# Patient Record
Sex: MALE | Race: BLACK OR AFRICAN AMERICAN | Employment: FULL TIME | URBAN - METROPOLITAN AREA
[De-identification: names, ages, dates, MRNs, and addresses within clinical notes are randomized per-mention and may not be internally consistent; named-entity substitution may affect disease eponyms.]

---

## 2017-03-24 ENCOUNTER — OFFICE VISIT (OUTPATIENT)
Dept: CARDIOLOGY CLINIC | Age: 52
End: 2017-03-24

## 2017-03-24 VITALS
HEIGHT: 68 IN | HEART RATE: 82 BPM | OXYGEN SATURATION: 98 % | BODY MASS INDEX: 30.77 KG/M2 | WEIGHT: 203 LBS | SYSTOLIC BLOOD PRESSURE: 128 MMHG | DIASTOLIC BLOOD PRESSURE: 84 MMHG

## 2017-03-24 DIAGNOSIS — N18.2 CKD (CHRONIC KIDNEY DISEASE) STAGE 2, GFR 60-89 ML/MIN: ICD-10-CM

## 2017-03-24 DIAGNOSIS — E78.5 HYPERLIPIDEMIA WITH TARGET LDL LESS THAN 70: ICD-10-CM

## 2017-03-24 DIAGNOSIS — I25.709 CORONARY ARTERY DISEASE INVOLVING CORONARY BYPASS GRAFT OF NATIVE HEART WITH ANGINA PECTORIS (HCC): Primary | ICD-10-CM

## 2017-03-24 DIAGNOSIS — I10 ESSENTIAL HYPERTENSION: ICD-10-CM

## 2017-03-24 DIAGNOSIS — I65.23 BILATERAL CAROTID ARTERY STENOSIS: ICD-10-CM

## 2017-03-24 DIAGNOSIS — I25.5 ISCHEMIC CARDIOMYOPATHY: ICD-10-CM

## 2017-03-24 PROCEDURE — 99215 OFFICE O/P EST HI 40 MIN: CPT | Performed by: INTERNAL MEDICINE

## 2017-03-24 RX ORDER — RANOLAZINE 500 MG/1
500 TABLET, EXTENDED RELEASE ORAL 2 TIMES DAILY
Qty: 60 TABLET | Refills: 3 | Status: SHIPPED | OUTPATIENT
Start: 2017-03-24 | End: 2018-05-24 | Stop reason: SDUPTHER

## 2017-04-03 ENCOUNTER — HOSPITAL ENCOUNTER (OUTPATIENT)
Dept: VASCULAR LAB | Age: 52
Discharge: HOME | End: 2017-04-03
Attending: INTERNAL MEDICINE | Admitting: INTERNAL MEDICINE

## 2017-04-03 ENCOUNTER — HOSPITAL ENCOUNTER (OUTPATIENT)
Dept: NON INVASIVE DIAGNOSTICS | Age: 52
Discharge: HOME | End: 2017-04-04
Admitting: INTERNAL MEDICINE

## 2017-04-03 DIAGNOSIS — I65.23 OCCLUSION AND STENOSIS OF BILATERAL CAROTID ARTERIES: ICD-10-CM

## 2017-04-03 LAB
LV EF: 47 %
LVEF MODALITY: NORMAL

## 2017-04-25 RX ORDER — HYDRALAZINE HYDROCHLORIDE 50 MG/1
TABLET, FILM COATED ORAL
Qty: 90 TABLET | Refills: 2 | Status: SHIPPED | OUTPATIENT
Start: 2017-04-25 | End: 2017-10-09 | Stop reason: SDUPTHER

## 2017-05-04 ENCOUNTER — OFFICE VISIT (OUTPATIENT)
Dept: ENDOCRINOLOGY | Age: 52
End: 2017-05-04

## 2017-05-04 VITALS
DIASTOLIC BLOOD PRESSURE: 88 MMHG | OXYGEN SATURATION: 95 % | BODY MASS INDEX: 30.52 KG/M2 | HEART RATE: 82 BPM | RESPIRATION RATE: 16 BRPM | HEIGHT: 68 IN | SYSTOLIC BLOOD PRESSURE: 165 MMHG | WEIGHT: 201.4 LBS

## 2017-05-04 DIAGNOSIS — I10 ESSENTIAL HYPERTENSION: ICD-10-CM

## 2017-05-04 DIAGNOSIS — E10.22 TYPE 1 DIABETES MELLITUS WITH DIABETIC CHRONIC KIDNEY DISEASE, UNSPECIFIED CKD STAGE (HCC): Primary | ICD-10-CM

## 2017-05-04 DIAGNOSIS — Z46.81 INSULIN PUMP TITRATION: ICD-10-CM

## 2017-05-04 DIAGNOSIS — Z96.41 INSULIN PUMP IN PLACE: ICD-10-CM

## 2017-05-04 LAB — HBA1C MFR BLD: 9 %

## 2017-05-04 PROCEDURE — 83036 HEMOGLOBIN GLYCOSYLATED A1C: CPT | Performed by: INTERNAL MEDICINE

## 2017-05-04 PROCEDURE — 99215 OFFICE O/P EST HI 40 MIN: CPT | Performed by: INTERNAL MEDICINE

## 2017-05-16 RX ORDER — FUROSEMIDE 40 MG/1
40 TABLET ORAL DAILY
Qty: 90 TABLET | Refills: 2 | Status: SHIPPED | OUTPATIENT
Start: 2017-05-16 | End: 2017-05-17 | Stop reason: SDUPTHER

## 2017-05-16 RX ORDER — CLOPIDOGREL BISULFATE 75 MG/1
75 TABLET ORAL DAILY
Qty: 90 TABLET | Refills: 2 | Status: SHIPPED | OUTPATIENT
Start: 2017-05-16 | End: 2017-05-17 | Stop reason: SDUPTHER

## 2017-05-17 ENCOUNTER — TELEPHONE (OUTPATIENT)
Dept: CARDIOLOGY CLINIC | Age: 52
End: 2017-05-17

## 2017-05-17 ENCOUNTER — OFFICE VISIT (OUTPATIENT)
Dept: CARDIOLOGY CLINIC | Age: 52
End: 2017-05-17

## 2017-05-17 VITALS
OXYGEN SATURATION: 98 % | HEIGHT: 68 IN | BODY MASS INDEX: 30.77 KG/M2 | WEIGHT: 203 LBS | HEART RATE: 80 BPM | DIASTOLIC BLOOD PRESSURE: 82 MMHG | SYSTOLIC BLOOD PRESSURE: 124 MMHG

## 2017-05-17 DIAGNOSIS — E10.9 TYPE 1 DIABETES MELLITUS WITHOUT COMPLICATION (HCC): Chronic | ICD-10-CM

## 2017-05-17 DIAGNOSIS — R94.39 ABNORMAL STRESS TEST: Primary | ICD-10-CM

## 2017-05-17 DIAGNOSIS — E78.5 HYPERLIPIDEMIA WITH TARGET LDL LESS THAN 70: ICD-10-CM

## 2017-05-17 DIAGNOSIS — I10 ESSENTIAL HYPERTENSION: ICD-10-CM

## 2017-05-17 DIAGNOSIS — I20.8 ANGINA EFFORT: ICD-10-CM

## 2017-05-17 DIAGNOSIS — I25.5 ISCHEMIC CARDIOMYOPATHY: ICD-10-CM

## 2017-05-17 DIAGNOSIS — N18.9 CHRONIC RENAL INSUFFICIENCY, UNSPECIFIED STAGE: ICD-10-CM

## 2017-05-17 DIAGNOSIS — I25.119 CORONARY ARTERY DISEASE INVOLVING NATIVE CORONARY ARTERY OF NATIVE HEART WITH ANGINA PECTORIS (HCC): Chronic | ICD-10-CM

## 2017-05-17 DIAGNOSIS — I65.21 CAROTID STENOSIS, RIGHT: ICD-10-CM

## 2017-05-17 PROCEDURE — 99215 OFFICE O/P EST HI 40 MIN: CPT | Performed by: INTERNAL MEDICINE

## 2017-05-17 RX ORDER — FAMOTIDINE 10 MG
TABLET ORAL
Qty: 3 TABLET | Refills: 1 | Status: SHIPPED | OUTPATIENT
Start: 2017-05-17 | End: 2018-02-05 | Stop reason: ALTCHOICE

## 2017-05-17 RX ORDER — DIPHENHYDRAMINE HCL 25 MG
TABLET ORAL
Qty: 3 TABLET | Refills: 1 | Status: SHIPPED | OUTPATIENT
Start: 2017-05-17 | End: 2018-02-05 | Stop reason: ALTCHOICE

## 2017-05-17 RX ORDER — CLOPIDOGREL BISULFATE 75 MG/1
75 TABLET ORAL DAILY
Qty: 90 TABLET | Refills: 3 | Status: SHIPPED | OUTPATIENT
Start: 2017-05-17 | End: 2018-06-28 | Stop reason: SDUPTHER

## 2017-05-17 RX ORDER — FUROSEMIDE 40 MG/1
40 TABLET ORAL DAILY
Qty: 90 TABLET | Refills: 3 | Status: SHIPPED | OUTPATIENT
Start: 2017-05-17 | End: 2018-08-31 | Stop reason: SDUPTHER

## 2017-05-17 RX ORDER — PREDNISONE 20 MG/1
TABLET ORAL
Qty: 6 TABLET | Refills: 1 | Status: SHIPPED | OUTPATIENT
Start: 2017-05-17 | End: 2018-02-05 | Stop reason: ALTCHOICE

## 2017-05-18 ENCOUNTER — TELEPHONE (OUTPATIENT)
Dept: CARDIOLOGY CLINIC | Age: 52
End: 2017-05-18

## 2017-06-07 ENCOUNTER — TELEPHONE (OUTPATIENT)
Dept: CARDIOLOGY CLINIC | Age: 52
End: 2017-06-07

## 2017-06-08 ENCOUNTER — TELEPHONE (OUTPATIENT)
Dept: CARDIOLOGY CLINIC | Age: 52
End: 2017-06-08

## 2017-06-20 ENCOUNTER — TELEPHONE (OUTPATIENT)
Dept: ENDOCRINOLOGY | Age: 52
End: 2017-06-20

## 2017-07-05 ENCOUNTER — TELEPHONE (OUTPATIENT)
Dept: CARDIOLOGY CLINIC | Age: 52
End: 2017-07-05

## 2017-07-06 ENCOUNTER — HOSPITAL ENCOUNTER (OUTPATIENT)
Dept: CARDIAC CATH/INVASIVE PROCEDURES | Age: 52
Discharge: HOME | End: 2017-07-06
Attending: INTERNAL MEDICINE | Admitting: INTERNAL MEDICINE

## 2017-07-06 VITALS — WEIGHT: 196 LBS | HEIGHT: 72 IN | BODY MASS INDEX: 26.55 KG/M2

## 2017-07-06 DIAGNOSIS — R94.39 ABNORMAL CARDIOVASCULAR STRESS TEST: ICD-10-CM

## 2017-07-06 LAB
ANION GAP SERPL CALCULATED.3IONS-SCNC: 13 MMOL/L (ref 3–16)
BUN BLDV-MCNC: 31 MG/DL (ref 7–20)
CALCIUM SERPL-MCNC: 9.3 MG/DL (ref 8.3–10.6)
CHLORIDE BLD-SCNC: 103 MMOL/L (ref 99–110)
CO2: 23 MMOL/L (ref 21–32)
CREAT SERPL-MCNC: 3.2 MG/DL (ref 0.9–1.3)
GFR AFRICAN AMERICAN: 25
GFR NON-AFRICAN AMERICAN: 21
GLUCOSE BLD-MCNC: 233 MG/DL (ref 70–99)
HCT VFR BLD CALC: 53.5 % (ref 40.5–52.5)
HEMOGLOBIN: 17 G/DL (ref 13.5–17.5)
INR BLD: 1.03 (ref 0.85–1.15)
MCH RBC QN AUTO: 27.6 PG (ref 26–34)
MCHC RBC AUTO-ENTMCNC: 31.7 G/DL (ref 31–36)
MCV RBC AUTO: 86.9 FL (ref 80–100)
PDW BLD-RTO: 14.7 % (ref 12.4–15.4)
PLATELET # BLD: 124 K/UL (ref 135–450)
PLATELET SLIDE REVIEW: ADEQUATE
PMV BLD AUTO: 10.2 FL (ref 5–10.5)
POTASSIUM SERPL-SCNC: 4.9 MMOL/L (ref 3.5–5.1)
PROTHROMBIN TIME: 11.6 SEC (ref 9.6–13)
RBC # BLD: 6.15 M/UL (ref 4.2–5.9)
SLIDE REVIEW: ABNORMAL
SODIUM BLD-SCNC: 139 MMOL/L (ref 136–145)
WBC # BLD: 5.8 K/UL (ref 4–11)

## 2017-07-06 PROCEDURE — 93459 L HRT ART/GRFT ANGIO: CPT | Performed by: INTERNAL MEDICINE

## 2017-07-06 PROCEDURE — 99152 MOD SED SAME PHYS/QHP 5/>YRS: CPT | Performed by: INTERNAL MEDICINE

## 2017-07-06 RX ORDER — SODIUM CHLORIDE 9 MG/ML
INJECTION, SOLUTION INTRAVENOUS CONTINUOUS
Status: DISCONTINUED | OUTPATIENT
Start: 2017-07-06 | End: 2017-07-07 | Stop reason: HOSPADM

## 2017-07-06 RX ORDER — ONDANSETRON 2 MG/ML
4 INJECTION INTRAMUSCULAR; INTRAVENOUS EVERY 6 HOURS PRN
Status: DISCONTINUED | OUTPATIENT
Start: 2017-07-06 | End: 2017-07-07 | Stop reason: HOSPADM

## 2017-07-06 RX ORDER — SODIUM CHLORIDE 0.9 % (FLUSH) 0.9 %
10 SYRINGE (ML) INJECTION PRN
Status: DISCONTINUED | OUTPATIENT
Start: 2017-07-06 | End: 2017-07-07 | Stop reason: HOSPADM

## 2017-07-06 RX ORDER — ASPIRIN 325 MG
325 TABLET ORAL ONCE
Status: DISCONTINUED | OUTPATIENT
Start: 2017-07-06 | End: 2017-07-07 | Stop reason: HOSPADM

## 2017-07-06 RX ORDER — SODIUM CHLORIDE 0.9 % (FLUSH) 0.9 %
10 SYRINGE (ML) INJECTION EVERY 12 HOURS SCHEDULED
Status: DISCONTINUED | OUTPATIENT
Start: 2017-07-06 | End: 2017-07-07 | Stop reason: HOSPADM

## 2017-07-06 RX ORDER — ACETAMINOPHEN 325 MG/1
650 TABLET ORAL EVERY 4 HOURS PRN
Status: DISCONTINUED | OUTPATIENT
Start: 2017-07-06 | End: 2017-07-07 | Stop reason: HOSPADM

## 2017-07-07 LAB
EKG ATRIAL RATE: 75 BPM
EKG DIAGNOSIS: NORMAL
EKG P AXIS: 65 DEGREES
EKG P-R INTERVAL: 134 MS
EKG Q-T INTERVAL: 386 MS
EKG QRS DURATION: 98 MS
EKG QTC CALCULATION (BAZETT): 431 MS
EKG R AXIS: 76 DEGREES
EKG T AXIS: 36 DEGREES
EKG VENTRICULAR RATE: 75 BPM

## 2017-07-10 RX ORDER — CARVEDILOL 25 MG/1
25 TABLET ORAL 2 TIMES DAILY WITH MEALS
Qty: 180 TABLET | Refills: 1 | Status: SHIPPED | OUTPATIENT
Start: 2017-07-10 | End: 2017-11-29 | Stop reason: SDUPTHER

## 2017-10-09 RX ORDER — HYDRALAZINE HYDROCHLORIDE 50 MG/1
TABLET, FILM COATED ORAL
Qty: 90 TABLET | Refills: 1 | Status: SHIPPED | OUTPATIENT
Start: 2017-10-09 | End: 2018-04-10 | Stop reason: SDUPTHER

## 2017-10-25 ENCOUNTER — OFFICE VISIT (OUTPATIENT)
Dept: ENDOCRINOLOGY | Age: 52
End: 2017-10-25

## 2017-10-25 VITALS
DIASTOLIC BLOOD PRESSURE: 78 MMHG | BODY MASS INDEX: 30.43 KG/M2 | OXYGEN SATURATION: 99 % | HEART RATE: 59 BPM | WEIGHT: 200.8 LBS | RESPIRATION RATE: 16 BRPM | HEIGHT: 68 IN | SYSTOLIC BLOOD PRESSURE: 159 MMHG

## 2017-10-25 DIAGNOSIS — E10.9 TYPE 1 DIABETES MELLITUS WITHOUT COMPLICATION (HCC): Primary | Chronic | ICD-10-CM

## 2017-10-25 DIAGNOSIS — Z96.41 INSULIN PUMP IN PLACE: ICD-10-CM

## 2017-10-25 DIAGNOSIS — Z46.81 INSULIN PUMP TITRATION: ICD-10-CM

## 2017-10-25 PROCEDURE — 99215 OFFICE O/P EST HI 40 MIN: CPT | Performed by: INTERNAL MEDICINE

## 2017-10-25 NOTE — PROGRESS NOTES
Seen as f/u patient for diabetes    Interim:     Diagnosed with Type 1 diabetes mellitus since age 17    Known diabetic complications: Nephropathy, Retinopathy, Neuropathy    Current diabetic medications   [place]:  Basal: rates different from previous     MN   0.9   4am  0.85   6am 0.95   1pm  0.925   - lower rate as more active   6pm  0.975     I:C   11  CF 1:40  Target 100    On sensor    Previous:  Lantus 30 units HS  Humalog <140  No insulin  2 for 50 >150    Last A1c 9% on <----8.3 on <------8.3 on 3/16<--- 9.0 on 8/15<------8.6 on 5/15<----- 9.8 on 3/15<---9.8<---10.0    Prior visit with dietician: None since Age 17  Current diet: on average, 5 meals per day not familiar with CHO counting, seeing dietician, eats a snack at night as awake/has insomnia  Current exercise: walking   Current monitoring regimen: home blood tests -6-7  times daily     Has brought blood glucose log/meter: yes  Home blood sugar records:    Any episodes of hypoglycemia? 56  Has brittle DM    H/o CAD s/p CABG     Hyperlipidemia: LDL 71 on 3/15  crestor 40mg  Last eye exam:   Last foot exam:   Last microalbumin to creatinine ratio: Cr 3.2 on   ACE-I or ARB: Norvasc 10mg coreg 25mg BID Hydralazine 50mg TID(ran out)    LAURA Ab 25 H    FH: Mom- CAD  Dad  DM, CAD  at 61 age  [de-identified] had MS,  in 46s    Past Medical History:   Diagnosis Date    Carotid artery stenosis 10/25/2016    SUZANNA stented with 8 x 30 mm non-tapered Xact stent    CHF (congestive heart failure) ([facility] 3/3/15    EF 30%     CKD (chronic kidney disease) stage 2, GFR 60-89 ml/min     Coronary artery disease     sp CABG UC    Diabetic peripheral neuropathy (HCC)     [facility] HF [facility] failure[facility]     Hyperlipidemia with target LDL less than 70     Hypertension goal BP (blood pressure) < 130/80     PVD (peripheral vascular disease) ([facility]     Seizures ([facility])     Type 1 diabetes mellitus with chronic kidney disease ([facility]      Past

## 2017-11-24 DIAGNOSIS — E10.9 TYPE 1 DIABETES MELLITUS WITHOUT COMPLICATION (HCC): Chronic | ICD-10-CM

## 2017-11-24 LAB
HCT VFR BLD CALC: 52.5 % (ref 40.5–52.5)
HEMOGLOBIN: 17 G/DL (ref 13.5–17.5)
MCH RBC QN AUTO: 28 PG (ref 26–34)
MCHC RBC AUTO-ENTMCNC: 32.4 G/DL (ref 31–36)
MCV RBC AUTO: 86.5 FL (ref 80–100)
PDW BLD-RTO: 15.2 % (ref 12.4–15.4)
PLATELET # BLD: 122 K/UL (ref 135–450)
PMV BLD AUTO: 11.4 FL (ref 5–10.5)
RBC # BLD: 6.07 M/UL (ref 4.2–5.9)
WBC # BLD: 6.6 K/UL (ref 4–11)

## 2017-11-25 LAB
ESTIMATED AVERAGE GLUCOSE: 217.3 MG/DL
HBA1C MFR BLD: 9.2 %

## 2017-11-27 ENCOUNTER — TELEPHONE (OUTPATIENT)
Dept: ENDOCRINOLOGY | Age: 52
End: 2017-11-27

## 2017-11-28 ENCOUNTER — TELEPHONE (OUTPATIENT)
Dept: CARDIOLOGY CLINIC | Age: 52
End: 2017-11-28

## 2017-11-29 RX ORDER — CARVEDILOL 25 MG/1
TABLET ORAL
Qty: 270 TABLET | Refills: 3 | Status: SHIPPED | OUTPATIENT
Start: 2017-11-29 | End: 2019-01-08 | Stop reason: SDUPTHER

## 2017-11-29 NOTE — TELEPHONE ENCOUNTER
Spoke with Porter Caldwell for 20 minutes  He reports that nothing has changed in his life  He continues to work, walks a lot for his job-  Aerobic exercise regularly for the last 6 months. Does not workout with weights. He has been diligently working on compliance with medical therapy, diet and working on controlling his DM. He did increase his morning coreg to 50 mg and kept his evening dose to 25 mg with complete improvement in his daytime chest pain for the last 1.5 months. He has been compliant with all medical therapy. He feels that the addition of Ranexa has also helped. He is now working with Dr. Castillo-Endocrinology   Last [address] [place] [address] 7/2017 and discussed possibility of intervention of the right posterolateral branch.

## 2017-11-29 NOTE — TELEPHONE ENCOUNTER
Patient has been informed of all. He will call and schedule appt with Dr Ricky Pulido after the 1st of the year. Would like his Ranexa and Nitro sent to [facility] and [facility]) I will call in. Patient is trying to get established with PCP and he will get Epi pen from PCP.

## 2017-11-30 RX ORDER — CARVEDILOL 25 MG/1
TABLET ORAL
Qty: 180 TABLET | Refills: 0 | OUTPATIENT
Start: 2017-11-30

## 2017-12-01 RX ORDER — AMLODIPINE BESYLATE 10 MG/1
TABLET ORAL
Qty: 30 TABLET | Refills: 1 | Status: SHIPPED | OUTPATIENT
Start: 2017-12-01 | End: 2018-03-02 | Stop reason: SDUPTHER

## 2017-12-06 ENCOUNTER — TELEPHONE (OUTPATIENT)
Dept: ENDOCRINOLOGY | Age: 52
End: 2017-12-06

## 2017-12-07 NOTE — TELEPHONE ENCOUNTER
Patient notified of the below message and stated he was wanting to get the new pump before the end of the year because he has net his out of pocket maximum and the pump would be completely covered.

## 2017-12-21 NOTE — TELEPHONE ENCOUNTER
Pt called he needs refills on his [facility] pharmacy has not he no showed last appointment but he saw Dr. Re Urena in October this year. He would like to have refills also added to script when its filled.   He would also like someone to call him in regards to him getting a pump by end of year

## 2017-12-28 ENCOUNTER — TELEPHONE (OUTPATIENT)
Dept: ENDOCRINOLOGY | Age: 52
End: 2017-12-28

## 2017-12-28 NOTE — TELEPHONE ENCOUNTER
Spoke to patient, he has lows at 11am, advised to change basal rate to 0.8 at 10:30 am.  He is also interested in T slim.  Please advise Papi Horne with T slim to initiate paper work for T slim pump with Dexcom sensor

## 2018-01-11 RX ORDER — ROSUVASTATIN CALCIUM 40 MG/1
TABLET, COATED ORAL
Qty: 90 TABLET | Refills: 2 | Status: SHIPPED | OUTPATIENT
Start: 2018-01-11 | End: 2018-08-31 | Stop reason: SDUPTHER

## 2018-01-15 RX ORDER — BLOOD SUGAR DIAGNOSTIC
STRIP MISCELLANEOUS
Qty: 200 STRIP | Refills: 9 | Status: ON HOLD | OUTPATIENT
Start: 2018-01-15 | End: 2018-05-10 | Stop reason: HOSPADM

## 2018-02-05 ENCOUNTER — OFFICE VISIT (OUTPATIENT)
Dept: INTERNAL MEDICINE CLINIC | Age: 53
End: 2018-02-05

## 2018-02-05 VITALS
SYSTOLIC BLOOD PRESSURE: 160 MMHG | HEART RATE: 72 BPM | RESPIRATION RATE: 16 BRPM | DIASTOLIC BLOOD PRESSURE: 82 MMHG | BODY MASS INDEX: 31.71 KG/M2 | WEIGHT: 202 LBS | HEIGHT: 67 IN

## 2018-02-05 DIAGNOSIS — Z23 FLU VACCINE NEED: ICD-10-CM

## 2018-02-05 DIAGNOSIS — G47.33 OSA (OBSTRUCTIVE SLEEP APNEA): ICD-10-CM

## 2018-02-05 DIAGNOSIS — F51.04 PSYCHOPHYSIOLOGICAL INSOMNIA: Primary | ICD-10-CM

## 2018-02-05 DIAGNOSIS — N18.4 CKD STAGE 4 DUE TO TYPE 1 DIABETES MELLITUS (HCC): ICD-10-CM

## 2018-02-05 DIAGNOSIS — I25.119 CORONARY ARTERY DISEASE INVOLVING NATIVE CORONARY ARTERY OF NATIVE HEART WITH ANGINA PECTORIS (HCC): Chronic | ICD-10-CM

## 2018-02-05 DIAGNOSIS — I10 ESSENTIAL HYPERTENSION: ICD-10-CM

## 2018-02-05 DIAGNOSIS — E10.22 TYPE 1 DIABETES MELLITUS WITH STAGE 4 CHRONIC KIDNEY DISEASE (HCC): Chronic | ICD-10-CM

## 2018-02-05 DIAGNOSIS — I50.32 CHRONIC DIASTOLIC HEART FAILURE (HCC): ICD-10-CM

## 2018-02-05 DIAGNOSIS — Z00.00 PREVENTATIVE HEALTH CARE: ICD-10-CM

## 2018-02-05 DIAGNOSIS — N18.4 TYPE 1 DIABETES MELLITUS WITH STAGE 4 CHRONIC KIDNEY DISEASE (HCC): Chronic | ICD-10-CM

## 2018-02-05 DIAGNOSIS — E10.22 CKD STAGE 4 DUE TO TYPE 1 DIABETES MELLITUS (HCC): ICD-10-CM

## 2018-02-05 PROCEDURE — 99214 OFFICE O/P EST MOD 30 MIN: CPT | Performed by: INTERNAL MEDICINE

## 2018-02-05 RX ORDER — ZOLPIDEM TARTRATE 10 MG/1
10 TABLET ORAL NIGHTLY PRN
Qty: 30 TABLET | Refills: 2 | Status: SHIPPED | OUTPATIENT
Start: 2018-02-05 | End: 2018-03-07

## 2018-02-05 RX ORDER — SILDENAFIL 100 MG/1
100 TABLET, FILM COATED ORAL PRN
Qty: 8 TABLET | Refills: 5 | Status: SHIPPED | OUTPATIENT
Start: 2018-02-05 | End: 2020-02-28 | Stop reason: ALTCHOICE

## 2018-02-05 ASSESSMENT — ENCOUNTER SYMPTOMS
VOMITING: 0
WHEEZING: 0
ABDOMINAL PAIN: 0
RHINORRHEA: 0
BLOOD IN STOOL: 0
CONSTIPATION: 0
EYE DISCHARGE: 0
TROUBLE SWALLOWING: 0
DIARRHEA: 0
COUGH: 0
SHORTNESS OF BREATH: 0
NAUSEA: 0
APNEA: 0
SORE THROAT: 0
ABDOMINAL DISTENTION: 0
BACK PAIN: 0
EYE PAIN: 0

## 2018-02-06 PROCEDURE — 90471 IMMUNIZATION ADMIN: CPT | Performed by: INTERNAL MEDICINE

## 2018-02-06 PROCEDURE — 90686 IIV4 VACC NO PRSV 0.5 ML IM: CPT | Performed by: INTERNAL MEDICINE

## 2018-02-06 NOTE — PROGRESS NOTES
Subjective:      Patient ID: Mary Ladd is a 50 y.o. male. Establish care for preventive health, he lost his PCP a few yrs ago but sees several specialists, he has DM 1, CAD, CHF, and CKD, has not seen the Kidney doctor as yet, he c/o excessive daytime sleepiness, onset within 3 hrs of waking, he has delayed sleep onset because of excessive worry and he is also known to snore, he compensates by taking lunchtime naps,         Review of Systems   Constitutional: Positive for fatigue. Negative for activity change, appetite change, chills, fever and unexpected weight change. HENT: Negative for congestion, hearing loss, nosebleeds, postnasal drip, rhinorrhea, sore throat and trouble swallowing. Eyes: Negative for pain, discharge and visual disturbance. Respiratory: Negative for apnea, cough, shortness of breath and wheezing. Cardiovascular: Positive for leg swelling. Negative for chest pain and palpitations. Gastrointestinal: Negative for abdominal distention, abdominal pain, blood in stool, constipation, diarrhea, nausea and vomiting. Endocrine: Negative for cold intolerance, heat intolerance, polydipsia, polyphagia and polyuria. Genitourinary: Negative for difficulty urinating, dysuria, hematuria, scrotal swelling, testicular pain and urgency. Weak erections   Musculoskeletal: Negative for arthralgias, back pain, gait problem, joint swelling and myalgias. Skin: Negative for rash and wound. Allergic/Immunologic: Negative for immunocompromised state. Neurological: Negative for dizziness, syncope, speech difficulty, weakness, light-headedness and headaches. Hematological: Negative. Negative for adenopathy. Psychiatric/Behavioral: Positive for sleep disturbance. Negative for confusion, decreased concentration, dysphoric mood and hallucinations. The patient is nervous/anxious.          Past Medical History:   Diagnosis Date    Carotid artery stenosis 10/25/2016    SUZANNA stented with 8 x 30 mm non-tapered Xact stent    CHF (congestive heart failure) ([facility] 3/3/15    EF 30%     CKD (chronic kidney disease) stage 2, GFR 60-89 ml/min     Coronary artery disease     sp CABG UC    Diabetic peripheral neuropathy (HCC)     Diastolic HF (heart failure) (HCC)     Hyperlipidemia with target LDL less than 70     Hypertension goal BP (blood pressure) < 130/80     PVD (peripheral vascular disease) (HCC)     Seizures (HCC)     Type 1 diabetes mellitus with chronic kidney disease ([facility]        I personally reviewed active meds and allergies with the patient today    Objective:   Physical Exam   Constitutional: He is oriented to person, place, and time. He appears well-developed. He does not appear ill. No distress. Obese habitus   HENT:   Head: Normocephalic and atraumatic. Mouth/Throat: Oropharynx is clear and moist and mucous membranes are normal. Mucous membranes are not pale, not dry and not cyanotic. No oral lesions. No oropharyngeal exudate. Eyes: Conjunctivae, EOM and lids are normal. Pupils are equal, round, and reactive to light. Right eye exhibits no discharge. Left eye exhibits no discharge. No scleral icterus. Neck: No JVD present. Carotid bruit is not present. No tracheal deviation present. No thyromegaly present. Cardiovascular: Normal rate, regular rhythm and normal heart sounds. Exam reveals no gallop. No murmur heard. Pulmonary/Chest: Effort normal and breath sounds normal. No stridor. No respiratory distress. He has no wheezes. He has no rales. Abdominal: Soft. Bowel sounds are normal. He exhibits no distension and no mass. There is no hepatosplenomegaly. There is no tenderness. There is no guarding. Musculoskeletal: He exhibits edema (2+ pitting leg edema bilaterally). He exhibits no tenderness or deformity. Lymphadenopathy:     He has no cervical adenopathy. He has no axillary adenopathy. Right: No inguinal and no supraclavicular adenopathy present. Left: No inguinal and no supraclavicular adenopathy present. Neurological: He is alert and oriented to person, place, and time. He has normal strength and normal reflexes. He displays no atrophy and no tremor. No cranial nerve deficit. He exhibits normal muscle tone. Coordination and gait normal.   Abnormal monofilament testing of left foot   Skin: Skin is warm, dry and intact. No rash noted. He is not diaphoretic. No cyanosis or erythema. No pallor. Nails show no clubbing. No foot ulcers   Psychiatric: He has a normal mood and affect. His speech is normal and behavior is normal. Thought content normal.   Vitals reviewed. BP (!) 160/82 (Site: Right Arm, Position: Sitting, Cuff Size: Large Adult)   Pulse 72 Comment: Regular  Resp 16   Ht 5' 7\" (1.702 m)   Wt 202 lb (91.6 kg)   BMI 31.64 kg/m²       Assessment:         ICD-10-CM ICD-9-CM    1. Psychophysiological insomnia F51.04 307.42 zolpidem (AMBIEN) 10 MG tablet   2. KATIE (obstructive sleep apnea) G47.33 327.23 Nisa Dangelo MD   3. Type 1 diabetes mellitus with stage 4 chronic kidney disease (HCC) E10.22 250.41 MICROALBUMIN / CREATININE URINE RATIO    N18.4 585.4 MICROALBUMIN / CREATININE URINE RATIO      HEMOGLOBIN A1C      RENAL FUNCTION PANEL   4. Chronic diastolic heart failure (HCC) I50.32 428.32    5. Coronary artery disease involving native coronary artery of native heart with angina pectoris ([facility] I25.119 414.01 LIPID PANEL     413.9    6. Preventative health care Z00.00 V70.0 Rick Olmedo MD ([facility])   7. Essential hypertension I10 401.9    8.  CKD stage 4 due to type 1 diabetes mellitus ([facility] E10.22 250.41 External Referral To Nephrology    N18.4 585.4 RENAL FUNCTION PANEL      HTN is uncontrolled but may be a white coat effect, previously well controlled      Plan:       start prn Viagra and prn Ambien  Use the prn Nitro whenever angina present, but do not take Viagra within 3 hrs of Nitro  Check labs  Establish with nephrology, he needs to be monitored and educated about HD and vascular grafts   see sleep MD for PSG, he has CHF and KATIE treatment reduces morbidity     see me in 6 mths, but come in sooner if he cannot see his other physicians within 2 weeks for BP evaluation     Orders Placed This Encounter   Procedures    INFLUENZA, QUADV, 3 YRS AND OLDER, IM, PF, PREFILL SYR OR SDV, 0.5ML (FLUZONE QUADV, PF)    MICROALBUMIN / CREATININE URINE RATIO    MICROALBUMIN / Adarsh Maker A1C    RENAL FUNCTION PANEL   Corene Mode- Behzad Aranda MD (GORGE)   Damaris Cruz MD    External Referral To Nephrology       Current Outpatient Prescriptions   Medication Sig Dispense Refill    sildenafil (VIAGRA) 100 MG tablet Take 1 tablet by mouth as needed for Erectile Dysfunction 8 tablet 5    zolpidem (AMBIEN) 10 MG tablet Take 1 tablet by mouth nightly as needed for Sleep for up to 30 days.  30 tablet 2    ONETOUCH VERIO strip USE TO TEST SIX TIMES PER  strip 9    rosuvastatin (CRESTOR) 40 MG tablet TAKE ONE TABLET BY MOUTH EVERY EVENING 90 tablet 2    HUMALOG 100 UNIT/ML injection vial INJECT 50 TO 60 UNITS UNDER THE SKIN  DAILY INTO INSULIN PUMP 2 vial 2    amLODIPine (NORVASC) 10 MG tablet TAKE ONE TABLET BY MOUTH DAILY 30 tablet 1    carvedilol (COREG) 25 MG tablet 50 mg by mouth every morning and 25 mg by mouth every evening 270 tablet 3    hydrALAZINE (APRESOLINE) 50 MG tablet TAKE ONE TABLET BY MOUTH EVERY 8 HOURS 90 tablet 1    insulin lispro (HUMALOG) 100 UNIT/ML injection vial INJECT 50 TO 60 UNITS UNDER THE SKIN DAILY INTO INSULIN PUMP 1 vial 0    clopidogrel (PLAVIX) 75 MG tablet Take 1 tablet by mouth daily 90 tablet 3    furosemide (LASIX) 40 MG tablet Take 1 tablet by mouth daily 90 tablet 3    ranolazine (RANEXA) 500 MG extended release tablet Take 1 tablet by mouth 2 times daily 60 tablet 3    nitroGLYCERIN (NITROSTAT) 0.4 MG SL tablet Place 1

## 2018-02-12 ENCOUNTER — OFFICE VISIT (OUTPATIENT)
Dept: ENDOCRINOLOGY | Age: 53
End: 2018-02-12

## 2018-02-12 VITALS
WEIGHT: 202.8 LBS | BODY MASS INDEX: 31.76 KG/M2 | SYSTOLIC BLOOD PRESSURE: 138 MMHG | TEMPERATURE: 98.2 F | DIASTOLIC BLOOD PRESSURE: 76 MMHG

## 2018-02-12 DIAGNOSIS — Z96.41 INSULIN PUMP IN PLACE: ICD-10-CM

## 2018-02-12 LAB — HBA1C MFR BLD: 8 %

## 2018-02-12 PROCEDURE — 99214 OFFICE O/P EST MOD 30 MIN: CPT | Performed by: INTERNAL MEDICINE

## 2018-02-12 PROCEDURE — 83036 HEMOGLOBIN GLYCOSYLATED A1C: CPT | Performed by: INTERNAL MEDICINE

## 2018-02-12 NOTE — PROGRESS NOTES
scanned    Objective:        /76   Temp 98.2 °F (36.8 °C) (Oral)   Wt 202 lb 12.8 oz (92 kg)   BMI 31.76 kg/m²   Wt Readings from Last 3 Encounters:   02/12/18 202 lb 12.8 oz (92 kg)   02/05/18 202 lb (91.6 kg)   10/25/17 200 lb 12.8 oz (91.1 kg)     Constitutional: Well-developed, alert, appears stated age, cooperative, in no acute distress  H/E/N/M/T:atraumatic, normocephalic, external ears, nose, lips normal without lesions  Skin: Xanthoma/Xanthelasmas are  not present  Psychiatric: Judgement and Insight:  judgement and insight appear normal  Neuro: Normal without focal findings, speech is spontaneous    4/17  Skeletal foot exam is normal, no skin lesions, toenails are normal,10 g monofilament is 7/10 on the right and 6/10 on the left     Lab Reviewed   No components found for: CHLPL  Lab Results   Component Value Date    TRIG 117 10/25/2016    TRIG 113 05/19/2016    TRIG 147 03/13/2015     Lab Results   Component Value Date    HDL 38 (L) 10/25/2016    HDL 41 05/19/2016    HDL 47 03/13/2015     Lab Results   Component Value Date    LDLCALC 79 10/25/2016    LDLCALC 59 05/19/2016    LDLCALC 71 03/13/2015     Lab Results   Component Value Date    LABVLDL 23 10/25/2016    LABVLDL 23 05/19/2016    LABVLDL 29 03/13/2015     Lab Results   Component Value Date    LABA1C 9.2 11/24/2017       Assessment:     Ananth Cazares is a 50 y.o. male with :    1.T1DM:Longstanding, fairly controlled, has complications. Not following regularly. Saw educator. Started on insulin pump/sensor. Goal 7-8%, needs to check more regularly, reviewed sensor log, he does have highs when misses boluses, also a pattern of more highs at MN but has snacks at time without bolus. Also recommend to bolus based on CHO intake. He does so after meal given variable intake  He has [place], has to switch, discussed 670G , prefers T slim  2. HTN: BP at goal  3. HLD: LDL at goal  4. Obesity  5. CKD: Stage IV  , see nephrology  6. Neuropathy  7. CAD: S/p

## 2018-02-14 ENCOUNTER — TELEPHONE (OUTPATIENT)
Dept: SLEEP MEDICINE | Age: 53
End: 2018-02-14

## 2018-03-05 RX ORDER — AMLODIPINE BESYLATE 10 MG/1
TABLET ORAL
Qty: 30 TABLET | Refills: 1 | Status: ON HOLD | OUTPATIENT
Start: 2018-03-05 | End: 2018-05-10 | Stop reason: HOSPADM

## 2018-04-11 RX ORDER — HYDRALAZINE HYDROCHLORIDE 50 MG/1
TABLET, FILM COATED ORAL
Qty: 90 TABLET | Refills: 5 | Status: SHIPPED | OUTPATIENT
Start: 2018-04-11 | End: 2019-03-28 | Stop reason: SDUPTHER

## 2018-05-06 PROBLEM — G93.40 ACUTE ENCEPHALOPATHY: Status: ACTIVE | Noted: 2018-05-06

## 2018-05-08 PROBLEM — I21.4 NON-STEMI (NON-ST ELEVATED MYOCARDIAL INFARCTION) (HCC): Status: ACTIVE | Noted: 2018-05-08

## 2018-05-22 NOTE — TELEPHONE ENCOUNTER
pts insurance called and asked about him getting this meter they would like to have this resent pts meter was lost and now has no pump at all please call pt and let him know whats going on

## 2018-05-23 ENCOUNTER — TELEPHONE (OUTPATIENT)
Dept: ENDOCRINOLOGY | Age: 53
End: 2018-05-23

## 2018-05-24 ENCOUNTER — OFFICE VISIT (OUTPATIENT)
Dept: CARDIOLOGY CLINIC | Age: 53
End: 2018-05-24

## 2018-05-24 VITALS
HEIGHT: 68 IN | BODY MASS INDEX: 30.16 KG/M2 | SYSTOLIC BLOOD PRESSURE: 124 MMHG | WEIGHT: 199 LBS | HEART RATE: 68 BPM | DIASTOLIC BLOOD PRESSURE: 62 MMHG

## 2018-05-24 DIAGNOSIS — I20.8 CHRONIC STABLE ANGINA (HCC): ICD-10-CM

## 2018-05-24 DIAGNOSIS — I10 HTN (HYPERTENSION), BENIGN: ICD-10-CM

## 2018-05-24 DIAGNOSIS — I65.21 ATHEROSCLEROSIS OF RIGHT CAROTID ARTERY: ICD-10-CM

## 2018-05-24 DIAGNOSIS — I25.10 CORONARY ARTERY DISEASE DUE TO LIPID RICH PLAQUE: Primary | ICD-10-CM

## 2018-05-24 DIAGNOSIS — Z95.1 S/P CABG (CORONARY ARTERY BYPASS GRAFT): ICD-10-CM

## 2018-05-24 DIAGNOSIS — I21.4 NON-STEMI (NON-ST ELEVATED MYOCARDIAL INFARCTION) (HCC): ICD-10-CM

## 2018-05-24 DIAGNOSIS — E78.5 DYSLIPIDEMIA: ICD-10-CM

## 2018-05-24 DIAGNOSIS — I73.9 PVD (PERIPHERAL VASCULAR DISEASE) (HCC): ICD-10-CM

## 2018-05-24 DIAGNOSIS — I87.2 VENOUS INSUFFICIENCY OF BOTH LOWER EXTREMITIES: ICD-10-CM

## 2018-05-24 DIAGNOSIS — I65.23 BILATERAL CAROTID ARTERY STENOSIS: ICD-10-CM

## 2018-05-24 DIAGNOSIS — I25.5 ISCHEMIC CARDIOMYOPATHY: ICD-10-CM

## 2018-05-24 DIAGNOSIS — I25.83 CORONARY ARTERY DISEASE DUE TO LIPID RICH PLAQUE: Primary | ICD-10-CM

## 2018-05-24 PROCEDURE — 99214 OFFICE O/P EST MOD 30 MIN: CPT | Performed by: INTERNAL MEDICINE

## 2018-05-24 RX ORDER — RANOLAZINE 500 MG/1
500 TABLET, EXTENDED RELEASE ORAL 2 TIMES DAILY
Qty: 180 TABLET | Refills: 3 | Status: SHIPPED | OUTPATIENT
Start: 2018-05-24 | End: 2018-08-31 | Stop reason: SDUPTHER

## 2018-05-25 RX ORDER — AMLODIPINE BESYLATE 10 MG/1
TABLET ORAL
Qty: 30 TABLET | Refills: 0 | Status: SHIPPED | OUTPATIENT
Start: 2018-05-25 | End: 2018-06-01 | Stop reason: ALTCHOICE

## 2018-06-01 ENCOUNTER — OFFICE VISIT (OUTPATIENT)
Dept: ENDOCRINOLOGY | Age: 53
End: 2018-06-01

## 2018-06-01 VITALS
HEART RATE: 72 BPM | DIASTOLIC BLOOD PRESSURE: 54 MMHG | SYSTOLIC BLOOD PRESSURE: 83 MMHG | RESPIRATION RATE: 16 BRPM | OXYGEN SATURATION: 97 %

## 2018-06-01 DIAGNOSIS — N18.4 TYPE 1 DIABETES MELLITUS WITH STAGE 4 CHRONIC KIDNEY DISEASE (HCC): Primary | Chronic | ICD-10-CM

## 2018-06-01 DIAGNOSIS — E16.2 HYPOGLYCEMIA: ICD-10-CM

## 2018-06-01 DIAGNOSIS — E10.22 TYPE 1 DIABETES MELLITUS WITH STAGE 4 CHRONIC KIDNEY DISEASE (HCC): Primary | Chronic | ICD-10-CM

## 2018-06-01 LAB
GLUCOSE BLD-MCNC: 193 MG/DL
HBA1C MFR BLD: 8.2 %

## 2018-06-01 PROCEDURE — 99214 OFFICE O/P EST MOD 30 MIN: CPT | Performed by: INTERNAL MEDICINE

## 2018-06-01 PROCEDURE — 83036 HEMOGLOBIN GLYCOSYLATED A1C: CPT | Performed by: INTERNAL MEDICINE

## 2018-06-01 PROCEDURE — 82962 GLUCOSE BLOOD TEST: CPT | Performed by: INTERNAL MEDICINE

## 2018-06-01 RX ORDER — NIFEDIPINE 30 MG/1
30 TABLET, FILM COATED, EXTENDED RELEASE ORAL DAILY
Qty: 30 TABLET | Refills: 3 | Status: SHIPPED | OUTPATIENT
Start: 2018-06-01 | End: 2018-10-15 | Stop reason: SDUPTHER

## 2018-06-01 RX ORDER — FLASH GLUCOSE SENSOR
KIT MISCELLANEOUS
Qty: 3 EACH | Refills: 2 | Status: SHIPPED | OUTPATIENT
Start: 2018-06-01 | End: 2021-03-01 | Stop reason: ALTCHOICE

## 2018-06-01 RX ORDER — FLASH GLUCOSE SENSOR
KIT MISCELLANEOUS
Qty: 1 DEVICE | Refills: 0 | Status: SHIPPED | OUTPATIENT
Start: 2018-06-01 | End: 2021-03-01 | Stop reason: ALTCHOICE

## 2018-06-05 ENCOUNTER — TELEPHONE (OUTPATIENT)
Dept: ENDOCRINOLOGY | Age: 53
End: 2018-06-05

## 2018-06-25 ENCOUNTER — TELEPHONE (OUTPATIENT)
Dept: ENDOCRINOLOGY | Age: 53
End: 2018-06-25

## 2018-06-27 ENCOUNTER — TELEPHONE (OUTPATIENT)
Dept: ENDOCRINOLOGY | Age: 53
End: 2018-06-27

## 2018-06-28 ENCOUNTER — TELEPHONE (OUTPATIENT)
Dept: CARDIOLOGY CLINIC | Age: 53
End: 2018-06-28

## 2018-06-28 RX ORDER — CLOPIDOGREL BISULFATE 75 MG/1
75 TABLET ORAL DAILY
Qty: 90 TABLET | Refills: 3 | Status: SHIPPED | OUTPATIENT
Start: 2018-06-28 | End: 2019-09-02 | Stop reason: SDUPTHER

## 2018-08-21 ENCOUNTER — PATIENT MESSAGE (OUTPATIENT)
Dept: CARDIOLOGY CLINIC | Age: 53
End: 2018-08-21

## 2018-08-21 RX ORDER — NITROGLYCERIN 0.4 MG/1
TABLET SUBLINGUAL
Qty: 25 TABLET | Refills: 0 | OUTPATIENT
Start: 2018-08-21

## 2018-08-21 NOTE — TELEPHONE ENCOUNTER
From: Azam Redd  To: Stephania Hernández MD  Sent: 8/21/2018 5:33 PM EDT  Subject: Prescription Question    I need a refill called in for my nitro pills.     The pharmacy number is [phone]      Thank you

## 2018-08-22 RX ORDER — NITROGLYCERIN 0.4 MG/1
0.4 TABLET SUBLINGUAL EVERY 5 MIN PRN
Qty: 25 TABLET | Refills: 3 | Status: ON HOLD | OUTPATIENT
Start: 2018-08-22 | End: 2019-07-28 | Stop reason: HOSPADM

## 2018-08-27 ENCOUNTER — TELEPHONE (OUTPATIENT)
Dept: CARDIOLOGY CLINIC | Age: 53
End: 2018-08-27

## 2018-08-27 NOTE — TELEPHONE ENCOUNTER
Pt calling Left foot (Big Toe) swollen the nail bed has been leaking clear fluid for about a week or two now. Pt has been cleaning it and keeping it bandaged, however it's gotten to the point that he could literally pull the nail off. Pt needs to know what he should do? Pt has no PCP.  Pls call to advise Thank you

## 2018-08-31 ENCOUNTER — OFFICE VISIT (OUTPATIENT)
Dept: CARDIOLOGY CLINIC | Age: 53
End: 2018-08-31

## 2018-08-31 VITALS
BODY MASS INDEX: 30.62 KG/M2 | HEIGHT: 68 IN | WEIGHT: 202 LBS | SYSTOLIC BLOOD PRESSURE: 130 MMHG | DIASTOLIC BLOOD PRESSURE: 80 MMHG | HEART RATE: 66 BPM

## 2018-08-31 DIAGNOSIS — I25.10 CORONARY ARTERY DISEASE DUE TO LIPID RICH PLAQUE: Primary | ICD-10-CM

## 2018-08-31 DIAGNOSIS — I73.9 PVD (PERIPHERAL VASCULAR DISEASE) (HCC): ICD-10-CM

## 2018-08-31 DIAGNOSIS — R06.02 SHORTNESS OF BREATH: ICD-10-CM

## 2018-08-31 DIAGNOSIS — I25.83 CORONARY ARTERY DISEASE DUE TO LIPID RICH PLAQUE: Primary | ICD-10-CM

## 2018-08-31 DIAGNOSIS — I21.4 NON-STEMI (NON-ST ELEVATED MYOCARDIAL INFARCTION) (HCC): ICD-10-CM

## 2018-08-31 DIAGNOSIS — I25.5 ISCHEMIC CARDIOMYOPATHY: ICD-10-CM

## 2018-08-31 DIAGNOSIS — I87.2 VENOUS INSUFFICIENCY OF BOTH LOWER EXTREMITIES: ICD-10-CM

## 2018-08-31 DIAGNOSIS — I10 ESSENTIAL HYPERTENSION: ICD-10-CM

## 2018-08-31 DIAGNOSIS — I65.29 CAROTID ATHEROSCLEROSIS, UNSPECIFIED LATERALITY: ICD-10-CM

## 2018-08-31 DIAGNOSIS — Z95.1 S/P CABG (CORONARY ARTERY BYPASS GRAFT): ICD-10-CM

## 2018-08-31 DIAGNOSIS — E78.5 HYPERLIPIDEMIA WITH TARGET LDL LESS THAN 70: ICD-10-CM

## 2018-08-31 PROCEDURE — 99214 OFFICE O/P EST MOD 30 MIN: CPT | Performed by: INTERNAL MEDICINE

## 2018-08-31 RX ORDER — RANOLAZINE 500 MG/1
500 TABLET, EXTENDED RELEASE ORAL 2 TIMES DAILY
Qty: 180 TABLET | Refills: 3 | Status: SHIPPED | OUTPATIENT
Start: 2018-08-31 | End: 2019-12-30 | Stop reason: SDUPTHER

## 2018-08-31 RX ORDER — FUROSEMIDE 40 MG/1
40 TABLET ORAL DAILY
Qty: 90 TABLET | Refills: 3 | Status: ON HOLD | OUTPATIENT
Start: 2018-08-31 | End: 2019-07-28 | Stop reason: HOSPADM

## 2018-08-31 RX ORDER — ROSUVASTATIN CALCIUM 40 MG/1
TABLET, COATED ORAL
Qty: 90 TABLET | Refills: 3 | Status: SHIPPED | OUTPATIENT
Start: 2018-08-31 | End: 2020-02-28 | Stop reason: SDUPTHER

## 2018-08-31 NOTE — PROGRESS NOTES
furosemide  ~ appears compensated on exam.    Plan > continue current management. 5. Essential hypertension  ~ Blood pressure 130/80, pulse 66, height 5' 8\" (1.727 m), weight 202 lb (91.6 kg). Plan > stable on current medications. 6. Hyperlipidemia with target LDL less than 70  ~ no recent lipids. Treated with Crestor 40 mg    Plan > repeat lipids and liver enzymes    7. PVD (peripheral vascular disease) ([address]  ~ 8/2016 carotid ultrasound > SUZANNA 80-99% stenosis  ~ 10/26/16 > s/p right internal carotid artery stenting with 8 x 30 mm non-tapered Xact stent by Dr. Jazzy Early  ~ 4/2017 carotid ultrasound > 1-15% bilateral ICA stenosis  ~ has 1+ DP pulses bilaterally (and infected left great toenail). Has bilateral carotid bruits. Plan > Refer to Dr. Danny Carrasquillo, repeat carotid ultrasound. 8. Venous insufficiency of both lower extremities  ~ lower extremity edema suggestive of venous insufficiency  ~ he is wearing compression stockings as recommended. Plan > continue use of compression hose. Plan:  1. No change in medication  2. Carotid ultrasound soon  3. Lipids and liver enzymes soon  4. Myoview GXT (patient will call to schedule when he has less pain with walking)  5. FU in six months or sooner if needed      Scribe's attestation: This note was scribed in the presence of Stephania Hernández M.D. by Scott Avelar RN    Physician Attestation: The scribe's documentation has been prepared under my direction and personally reviewed by me in its entirety. I confirm that the note above accurately reflects all work, treatment, procedures, and medical decision making performed by me. An  electronic signature was used to authenticate this note. West Kunz MD, Aylin Farley, [address]

## 2018-09-05 ENCOUNTER — HOSPITAL ENCOUNTER (OUTPATIENT)
Dept: VASCULAR LAB | Age: 53
Discharge: HOME | End: 2018-09-05
Attending: INTERNAL MEDICINE | Admitting: INTERNAL MEDICINE

## 2018-10-01 ENCOUNTER — PATIENT MESSAGE (OUTPATIENT)
Dept: ENDOCRINOLOGY | Age: 53
End: 2018-10-01

## 2018-10-15 ENCOUNTER — OFFICE VISIT (OUTPATIENT)
Dept: ENDOCRINOLOGY | Age: 53
End: 2018-10-15
Payer: COMMERCIAL

## 2018-10-15 VITALS
RESPIRATION RATE: 16 BRPM | DIASTOLIC BLOOD PRESSURE: 88 MMHG | OXYGEN SATURATION: 98 % | SYSTOLIC BLOOD PRESSURE: 170 MMHG | BODY MASS INDEX: 30.43 KG/M2 | HEIGHT: 68 IN | HEART RATE: 87 BPM | WEIGHT: 200.8 LBS

## 2018-10-15 DIAGNOSIS — E10.22 TYPE 1 DIABETES MELLITUS WITH DIABETIC CHRONIC KIDNEY DISEASE, UNSPECIFIED CKD STAGE (HCC): Primary | ICD-10-CM

## 2018-10-15 DIAGNOSIS — I10 ESSENTIAL HYPERTENSION: ICD-10-CM

## 2018-10-15 DIAGNOSIS — Z46.81 INSULIN PUMP TITRATION: ICD-10-CM

## 2018-10-15 DIAGNOSIS — Z96.41 INSULIN PUMP IN PLACE: ICD-10-CM

## 2018-10-15 LAB — HBA1C MFR BLD: 8.4 %

## 2018-10-15 PROCEDURE — 99215 OFFICE O/P EST HI 40 MIN: CPT | Performed by: INTERNAL MEDICINE

## 2018-10-15 PROCEDURE — 83036 HEMOGLOBIN GLYCOSYLATED A1C: CPT | Performed by: INTERNAL MEDICINE

## 2018-10-15 RX ORDER — NIFEDIPINE 60 MG/1
60 TABLET, FILM COATED, EXTENDED RELEASE ORAL DAILY
Qty: 30 TABLET | Refills: 2 | Status: SHIPPED | OUTPATIENT
Start: 2018-10-15 | End: 2019-02-12 | Stop reason: SDUPTHER

## 2018-10-15 NOTE — PROGRESS NOTES
carvedilol (COREG) 25 MG tablet 50 mg by mouth every morning and 25 mg by mouth every evening 270 tablet 3    aspirin 81 MG tablet Take 1 tablet by mouth daily 90 tablet 3    Multiple Vitamins-Minerals (THERAPEUTIC MULTIVITAMIN-MINERALS) tablet Take 1 tablet by mouth daily.  Nutritional Supplements (PROTEIN SUPPLEMENT 80% PO) Take  by mouth.  insulin lispro (HUMALOG KWIKPEN) 100 UNIT/ML pen Inject 6 Units into the skin 3 times daily (before meals) 1 pen 3    insulin glargine (LANTUS SOLOSTAR) 100 UNIT/ML injection pen Inject 30 Units into the skin nightly (Patient taking differently: Inject 24 Units into the skin nightly ) 1 pen 3    sildenafil (VIAGRA) 100 MG tablet Take 1 tablet by mouth as needed for Erectile Dysfunction 8 tablet 5     No current facility-administered medications for this visit. Review of Systems    Reviewed and scanned      Objective:        BP (!) 170/88   Pulse 87   Resp 16   Ht 5' 8\" (1.727 m)   Wt 200 lb 12.8 oz (91.1 kg)   SpO2 98%   BMI 30.53 kg/m²   Wt Readings from Last 3 Encounters:   10/15/18 200 lb 12.8 oz (91.1 kg)   08/31/18 202 lb (91.6 kg)   05/24/18 199 lb (90.3 kg)     Constitutional: Well-developed, appears stated age, cooperative, in no acute distress  H/E/N/M/T:atraumatic, normocephalic, external ears, nose, lips normal without lesions  Eyes: Lids, lashes, conjunctivae and sclerae normal, No proptosis, no redness  Neck: supple, symmetrical, no swelling  Skin: No obvious rashes or lesions present.   Skin and hair texture normal  Psychiatric: Judgement and Insight:  judgement and insight appear normal  Neuro: Normal without focal findings, speech is normal normal, speech is spontaneous  Chest: No labored breathing, no chest deformity, no stridor  Musculoskeletal: No joint deformity, swelling    10/18  Skeletal foot exam is normal, no skin lesions, toenails are normal,10 g monofilament is 7/10 on the right and 6/10 on the left     Lab Reviewed   No

## 2019-01-08 ENCOUNTER — TELEPHONE (OUTPATIENT)
Dept: CARDIOLOGY CLINIC | Age: 54
End: 2019-01-08

## 2019-01-08 RX ORDER — CARVEDILOL 25 MG/1
TABLET ORAL
Qty: 90 TABLET | Refills: 1 | Status: SHIPPED | OUTPATIENT
Start: 2019-01-08 | End: 2019-03-13 | Stop reason: SDUPTHER

## 2019-02-13 RX ORDER — NIFEDIPINE 60 MG/1
TABLET, EXTENDED RELEASE ORAL
Qty: 30 TABLET | Refills: 3 | Status: ON HOLD | OUTPATIENT
Start: 2019-02-13 | End: 2019-07-28 | Stop reason: HOSPADM

## 2019-03-13 RX ORDER — CARVEDILOL 25 MG/1
TABLET ORAL
Qty: 90 TABLET | Refills: 0 | Status: SHIPPED | OUTPATIENT
Start: 2019-03-13 | End: 2019-04-15 | Stop reason: SDUPTHER

## 2019-03-26 RX ORDER — HYDRALAZINE HYDROCHLORIDE 50 MG/1
TABLET, FILM COATED ORAL
Qty: 90 TABLET | Refills: 2 | OUTPATIENT
Start: 2019-03-26

## 2019-03-28 RX ORDER — HYDRALAZINE HYDROCHLORIDE 50 MG/1
50 TABLET, FILM COATED ORAL 3 TIMES DAILY
Qty: 90 TABLET | Refills: 5 | Status: ON HOLD | OUTPATIENT
Start: 2019-03-28 | End: 2019-07-31 | Stop reason: HOSPADM

## 2019-04-16 RX ORDER — CARVEDILOL 25 MG/1
TABLET ORAL
Qty: 90 TABLET | Refills: 5 | Status: ON HOLD | OUTPATIENT
Start: 2019-04-16 | End: 2019-07-31 | Stop reason: SDUPTHER

## 2019-07-24 ENCOUNTER — HOSPITAL ENCOUNTER (INPATIENT)
Age: 54
LOS: 4 days | Discharge: ANOTHER HOSPITAL | DRG: 281 | End: 2019-07-29
Attending: EMERGENCY MEDICINE | Admitting: INTERNAL MEDICINE
Payer: COMMERCIAL

## 2019-07-24 ENCOUNTER — APPOINTMENT (OUTPATIENT)
Dept: GENERAL RADIOLOGY | Age: 54
DRG: 281 | End: 2019-07-24

## 2019-07-24 DIAGNOSIS — R73.9 HYPERGLYCEMIA: ICD-10-CM

## 2019-07-24 DIAGNOSIS — R07.9 CHEST PAIN, UNSPECIFIED TYPE: ICD-10-CM

## 2019-07-24 DIAGNOSIS — R55 PRE-SYNCOPE: ICD-10-CM

## 2019-07-24 DIAGNOSIS — I48.91 ATRIAL FIBRILLATION WITH RAPID VENTRICULAR RESPONSE (HCC): Primary | ICD-10-CM

## 2019-07-24 DIAGNOSIS — N18.4 STAGE 4 CHRONIC KIDNEY DISEASE (HCC): ICD-10-CM

## 2019-07-24 LAB
A/G RATIO: 1.4 (ref 1.1–2.2)
ALBUMIN SERPL-MCNC: 4.2 G/DL (ref 3.4–5)
ALP BLD-CCNC: 138 U/L (ref 40–129)
ALT SERPL-CCNC: 16 U/L (ref 10–40)
ANION GAP SERPL CALCULATED.3IONS-SCNC: 13 MMOL/L (ref 3–16)
AST SERPL-CCNC: 26 U/L (ref 15–37)
BASE EXCESS VENOUS: -5.8 MMOL/L (ref -3–3)
BASOPHILS ABSOLUTE: 0 K/UL (ref 0–0.2)
BASOPHILS RELATIVE PERCENT: 0.4 %
BILIRUB SERPL-MCNC: 0.3 MG/DL (ref 0–1)
BILIRUBIN URINE: NEGATIVE
BLOOD, URINE: NEGATIVE
BUN BLDV-MCNC: 42 MG/DL (ref 7–20)
CALCIUM SERPL-MCNC: 9.1 MG/DL (ref 8.3–10.6)
CARBOXYHEMOGLOBIN: 2 % (ref 0–1.5)
CHLORIDE BLD-SCNC: 100 MMOL/L (ref 99–110)
CHP ED QC CHECK: YES
CLARITY: CLEAR
CO2: 23 MMOL/L (ref 21–32)
COLOR: YELLOW
CREAT SERPL-MCNC: 4.4 MG/DL (ref 0.9–1.3)
EOSINOPHILS ABSOLUTE: 0 K/UL (ref 0–0.6)
EOSINOPHILS RELATIVE PERCENT: 0.5 %
EPITHELIAL CELLS, UA: NORMAL /HPF
GFR AFRICAN AMERICAN: 17
GFR NON-AFRICAN AMERICAN: 14
GLOBULIN: 3.1 G/DL
GLUCOSE BLD-MCNC: 390 MG/DL
GLUCOSE BLD-MCNC: 390 MG/DL (ref 70–99)
GLUCOSE BLD-MCNC: 403 MG/DL (ref 70–99)
GLUCOSE URINE: 500 MG/DL
HCO3 VENOUS: 20.3 MMOL/L (ref 23–29)
HCT VFR BLD CALC: 56.5 % (ref 40.5–52.5)
HEMOGLOBIN: 17.9 G/DL (ref 13.5–17.5)
KETONES, URINE: NEGATIVE MG/DL
LEUKOCYTE ESTERASE, URINE: NEGATIVE
LYMPHOCYTES ABSOLUTE: 0.9 K/UL (ref 1–5.1)
LYMPHOCYTES RELATIVE PERCENT: 16.6 %
MCH RBC QN AUTO: 27.7 PG (ref 26–34)
MCHC RBC AUTO-ENTMCNC: 31.6 G/DL (ref 31–36)
MCV RBC AUTO: 87.7 FL (ref 80–100)
METHEMOGLOBIN VENOUS: 0.2 %
MICROSCOPIC EXAMINATION: YES
MONOCYTES ABSOLUTE: 0.2 K/UL (ref 0–1.3)
MONOCYTES RELATIVE PERCENT: 3.8 %
NEUTROPHILS ABSOLUTE: 4.4 K/UL (ref 1.7–7.7)
NEUTROPHILS RELATIVE PERCENT: 78.7 %
NITRITE, URINE: NEGATIVE
O2 CONTENT, VEN: 23 VOL %
O2 SAT, VEN: 87 %
O2 THERAPY: ABNORMAL
PCO2, VEN: 41.9 MMHG (ref 40–50)
PDW BLD-RTO: 15.5 % (ref 12.4–15.4)
PERFORMED ON: ABNORMAL
PH UA: 5.5 (ref 5–8)
PH VENOUS: 7.3 (ref 7.35–7.45)
PLATELET # BLD: 119 K/UL (ref 135–450)
PLATELET SLIDE REVIEW: ADEQUATE
PMV BLD AUTO: 11.3 FL (ref 5–10.5)
PO2, VEN: 57.3 MMHG (ref 25–40)
POTASSIUM REFLEX MAGNESIUM: 5.8 MMOL/L (ref 3.5–5.1)
PROTEIN UA: 100 MG/DL
RBC # BLD: 6.45 M/UL (ref 4.2–5.9)
RBC UA: NORMAL /HPF (ref 0–2)
SLIDE REVIEW: ABNORMAL
SODIUM BLD-SCNC: 136 MMOL/L (ref 136–145)
SPECIFIC GRAVITY UA: 1.02 (ref 1–1.03)
TCO2 CALC VENOUS: 22 MMOL/L
TOTAL PROTEIN: 7.3 G/DL (ref 6.4–8.2)
TROPONIN: <0.01 NG/ML
URINE REFLEX TO CULTURE: ABNORMAL
URINE TYPE: ABNORMAL
UROBILINOGEN, URINE: 0.2 E.U./DL
WBC # BLD: 5.5 K/UL (ref 4–11)
WBC UA: NORMAL /HPF (ref 0–5)

## 2019-07-24 PROCEDURE — 85025 COMPLETE CBC W/AUTO DIFF WBC: CPT

## 2019-07-24 PROCEDURE — 80053 COMPREHEN METABOLIC PANEL: CPT

## 2019-07-24 PROCEDURE — 84484 ASSAY OF TROPONIN QUANT: CPT

## 2019-07-24 PROCEDURE — 6370000000 HC RX 637 (ALT 250 FOR IP): Performed by: EMERGENCY MEDICINE

## 2019-07-24 PROCEDURE — 36415 COLL VENOUS BLD VENIPUNCTURE: CPT

## 2019-07-24 PROCEDURE — 71045 X-RAY EXAM CHEST 1 VIEW: CPT

## 2019-07-24 PROCEDURE — 82803 BLOOD GASES ANY COMBINATION: CPT

## 2019-07-24 PROCEDURE — 82010 KETONE BODYS QUAN: CPT

## 2019-07-24 PROCEDURE — 81001 URINALYSIS AUTO W/SCOPE: CPT

## 2019-07-24 PROCEDURE — 93005 ELECTROCARDIOGRAM TRACING: CPT | Performed by: EMERGENCY MEDICINE

## 2019-07-24 PROCEDURE — 99285 EMERGENCY DEPT VISIT HI MDM: CPT

## 2019-07-24 PROCEDURE — 96374 THER/PROPH/DIAG INJ IV PUSH: CPT

## 2019-07-24 RX ORDER — DILTIAZEM HYDROCHLORIDE 5 MG/ML
10 INJECTION INTRAVENOUS ONCE
Status: COMPLETED | OUTPATIENT
Start: 2019-07-25 | End: 2019-07-25

## 2019-07-24 RX ADMIN — INSULIN HUMAN 10 UNITS: 100 INJECTION, SOLUTION PARENTERAL at 23:56

## 2019-07-24 ASSESSMENT — PAIN DESCRIPTION - DESCRIPTORS: DESCRIPTORS: ACHING;STABBING

## 2019-07-24 ASSESSMENT — PAIN DESCRIPTION - PAIN TYPE: TYPE: ACUTE PAIN

## 2019-07-24 ASSESSMENT — PAIN SCALES - GENERAL: PAINLEVEL_OUTOF10: 4

## 2019-07-24 ASSESSMENT — PAIN DESCRIPTION - FREQUENCY: FREQUENCY: INTERMITTENT

## 2019-07-24 ASSESSMENT — PAIN DESCRIPTION - LOCATION: LOCATION: CHEST

## 2019-07-25 PROBLEM — I48.91 ATRIAL FIBRILLATION WITH RVR (HCC): Status: ACTIVE | Noted: 2019-07-25

## 2019-07-25 LAB
A/G RATIO: 0.9 (ref 1.1–2.2)
ALBUMIN SERPL-MCNC: 3.6 G/DL (ref 3.4–5)
ALP BLD-CCNC: 137 U/L (ref 40–129)
ALT SERPL-CCNC: 14 U/L (ref 10–40)
ANION GAP SERPL CALCULATED.3IONS-SCNC: 12 MMOL/L (ref 3–16)
ANION GAP SERPL CALCULATED.3IONS-SCNC: 12 MMOL/L (ref 3–16)
APTT: 31.4 SEC (ref 26–36)
APTT: 66.7 SEC (ref 26–36)
APTT: 77 SEC (ref 26–36)
AST SERPL-CCNC: 15 U/L (ref 15–37)
BASOPHILS ABSOLUTE: 0 K/UL (ref 0–0.2)
BASOPHILS RELATIVE PERCENT: 0.5 %
BETA-HYDROXYBUTYRATE: 0.4 MMOL/L (ref 0–0.27)
BILIRUB SERPL-MCNC: 0.3 MG/DL (ref 0–1)
BUN BLDV-MCNC: 42 MG/DL (ref 7–20)
BUN BLDV-MCNC: 43 MG/DL (ref 7–20)
CALCIUM SERPL-MCNC: 9 MG/DL (ref 8.3–10.6)
CALCIUM SERPL-MCNC: 9.1 MG/DL (ref 8.3–10.6)
CHLORIDE BLD-SCNC: 101 MMOL/L (ref 99–110)
CHLORIDE BLD-SCNC: 101 MMOL/L (ref 99–110)
CHOLESTEROL, TOTAL: 185 MG/DL (ref 0–199)
CHP ED QC CHECK: YES
CO2: 25 MMOL/L (ref 21–32)
CO2: 27 MMOL/L (ref 21–32)
CREAT SERPL-MCNC: 4.5 MG/DL (ref 0.9–1.3)
CREAT SERPL-MCNC: 4.5 MG/DL (ref 0.9–1.3)
EKG ATRIAL RATE: 108 BPM
EKG ATRIAL RATE: 62 BPM
EKG DIAGNOSIS: NORMAL
EKG DIAGNOSIS: NORMAL
EKG P AXIS: 54 DEGREES
EKG P-R INTERVAL: 140 MS
EKG Q-T INTERVAL: 342 MS
EKG Q-T INTERVAL: 432 MS
EKG QRS DURATION: 94 MS
EKG QRS DURATION: 94 MS
EKG QTC CALCULATION (BAZETT): 438 MS
EKG QTC CALCULATION (BAZETT): 483 MS
EKG R AXIS: 74 DEGREES
EKG R AXIS: 81 DEGREES
EKG T AXIS: -42 DEGREES
EKG T AXIS: -61 DEGREES
EKG VENTRICULAR RATE: 120 BPM
EKG VENTRICULAR RATE: 62 BPM
EOSINOPHILS ABSOLUTE: 0 K/UL (ref 0–0.6)
EOSINOPHILS RELATIVE PERCENT: 0.8 %
ESTIMATED AVERAGE GLUCOSE: 283.4 MG/DL
GFR AFRICAN AMERICAN: 17
GFR AFRICAN AMERICAN: 17
GFR NON-AFRICAN AMERICAN: 14
GFR NON-AFRICAN AMERICAN: 14
GLOBULIN: 3.8 G/DL
GLUCOSE BLD-MCNC: 174 MG/DL (ref 70–99)
GLUCOSE BLD-MCNC: 237 MG/DL (ref 70–99)
GLUCOSE BLD-MCNC: 250 MG/DL (ref 70–99)
GLUCOSE BLD-MCNC: 267 MG/DL (ref 70–99)
GLUCOSE BLD-MCNC: 310 MG/DL
GLUCOSE BLD-MCNC: 310 MG/DL (ref 70–99)
GLUCOSE BLD-MCNC: 335 MG/DL (ref 70–99)
GLUCOSE BLD-MCNC: 354 MG/DL (ref 70–99)
GLUCOSE BLD-MCNC: 409 MG/DL (ref 70–99)
HBA1C MFR BLD: 11.5 %
HCT VFR BLD CALC: 53.6 % (ref 40.5–52.5)
HDLC SERPL-MCNC: 45 MG/DL (ref 40–60)
HEMOGLOBIN: 17.4 G/DL (ref 13.5–17.5)
LACTIC ACID: 2.1 MMOL/L (ref 0.4–2)
LDL CHOLESTEROL CALCULATED: 125 MG/DL
LV EF: 38 %
LVEF MODALITY: NORMAL
LYMPHOCYTES ABSOLUTE: 1 K/UL (ref 1–5.1)
LYMPHOCYTES RELATIVE PERCENT: 17.1 %
MAGNESIUM: 2.2 MG/DL (ref 1.8–2.4)
MCH RBC QN AUTO: 28.1 PG (ref 26–34)
MCHC RBC AUTO-ENTMCNC: 32.4 G/DL (ref 31–36)
MCV RBC AUTO: 86.7 FL (ref 80–100)
MONOCYTES ABSOLUTE: 0.4 K/UL (ref 0–1.3)
MONOCYTES RELATIVE PERCENT: 6.7 %
NEUTROPHILS ABSOLUTE: 4.4 K/UL (ref 1.7–7.7)
NEUTROPHILS RELATIVE PERCENT: 74.9 %
PDW BLD-RTO: 15.2 % (ref 12.4–15.4)
PERFORMED ON: ABNORMAL
PLATELET # BLD: 138 K/UL (ref 135–450)
PMV BLD AUTO: 11.5 FL (ref 5–10.5)
POTASSIUM REFLEX MAGNESIUM: 4.4 MMOL/L (ref 3.5–5.1)
POTASSIUM REFLEX MAGNESIUM: 4.7 MMOL/L (ref 3.5–5.1)
RBC # BLD: 6.18 M/UL (ref 4.2–5.9)
SODIUM BLD-SCNC: 138 MMOL/L (ref 136–145)
SODIUM BLD-SCNC: 140 MMOL/L (ref 136–145)
TOTAL PROTEIN: 7.4 G/DL (ref 6.4–8.2)
TRIGL SERPL-MCNC: 75 MG/DL (ref 0–150)
TROPONIN: 0.02 NG/ML
TROPONIN: 0.1 NG/ML
TROPONIN: 0.12 NG/ML
TSH REFLEX: 0.65 UIU/ML (ref 0.27–4.2)
VLDLC SERPL CALC-MCNC: 15 MG/DL
WBC # BLD: 5.9 K/UL (ref 4–11)

## 2019-07-25 PROCEDURE — 2580000003 HC RX 258: Performed by: INTERNAL MEDICINE

## 2019-07-25 PROCEDURE — 83735 ASSAY OF MAGNESIUM: CPT

## 2019-07-25 PROCEDURE — 84484 ASSAY OF TROPONIN QUANT: CPT

## 2019-07-25 PROCEDURE — 93306 TTE W/DOPPLER COMPLETE: CPT

## 2019-07-25 PROCEDURE — 85730 THROMBOPLASTIN TIME PARTIAL: CPT

## 2019-07-25 PROCEDURE — 83036 HEMOGLOBIN GLYCOSYLATED A1C: CPT

## 2019-07-25 PROCEDURE — 83605 ASSAY OF LACTIC ACID: CPT

## 2019-07-25 PROCEDURE — 84443 ASSAY THYROID STIM HORMONE: CPT

## 2019-07-25 PROCEDURE — 93010 ELECTROCARDIOGRAM REPORT: CPT | Performed by: INTERNAL MEDICINE

## 2019-07-25 PROCEDURE — 80061 LIPID PANEL: CPT

## 2019-07-25 PROCEDURE — 93005 ELECTROCARDIOGRAM TRACING: CPT | Performed by: EMERGENCY MEDICINE

## 2019-07-25 PROCEDURE — 6370000000 HC RX 637 (ALT 250 FOR IP): Performed by: INTERNAL MEDICINE

## 2019-07-25 PROCEDURE — 2500000003 HC RX 250 WO HCPCS: Performed by: EMERGENCY MEDICINE

## 2019-07-25 PROCEDURE — 96375 TX/PRO/DX INJ NEW DRUG ADDON: CPT

## 2019-07-25 PROCEDURE — 85025 COMPLETE CBC W/AUTO DIFF WBC: CPT

## 2019-07-25 PROCEDURE — 80053 COMPREHEN METABOLIC PANEL: CPT

## 2019-07-25 PROCEDURE — 36415 COLL VENOUS BLD VENIPUNCTURE: CPT

## 2019-07-25 PROCEDURE — 99223 1ST HOSP IP/OBS HIGH 75: CPT | Performed by: INTERNAL MEDICINE

## 2019-07-25 PROCEDURE — 6360000002 HC RX W HCPCS: Performed by: INTERNAL MEDICINE

## 2019-07-25 PROCEDURE — 2060000000 HC ICU INTERMEDIATE R&B

## 2019-07-25 PROCEDURE — 99255 IP/OBS CONSLTJ NEW/EST HI 80: CPT | Performed by: INTERNAL MEDICINE

## 2019-07-25 RX ORDER — ONDANSETRON 2 MG/ML
4 INJECTION INTRAMUSCULAR; INTRAVENOUS EVERY 6 HOURS PRN
Status: DISCONTINUED | OUTPATIENT
Start: 2019-07-25 | End: 2019-07-29 | Stop reason: HOSPADM

## 2019-07-25 RX ORDER — CARVEDILOL 25 MG/1
25 TABLET ORAL 2 TIMES DAILY WITH MEALS
Status: DISCONTINUED | OUTPATIENT
Start: 2019-07-25 | End: 2019-07-29 | Stop reason: HOSPADM

## 2019-07-25 RX ORDER — CLOPIDOGREL BISULFATE 75 MG/1
75 TABLET ORAL DAILY
Status: DISCONTINUED | OUTPATIENT
Start: 2019-07-25 | End: 2019-07-29 | Stop reason: HOSPADM

## 2019-07-25 RX ORDER — SODIUM CHLORIDE 0.9 % (FLUSH) 0.9 %
10 SYRINGE (ML) INJECTION EVERY 12 HOURS SCHEDULED
Status: DISCONTINUED | OUTPATIENT
Start: 2019-07-25 | End: 2019-07-29 | Stop reason: HOSPADM

## 2019-07-25 RX ORDER — HYDRALAZINE HYDROCHLORIDE 50 MG/1
50 TABLET, FILM COATED ORAL 3 TIMES DAILY
Status: DISCONTINUED | OUTPATIENT
Start: 2019-07-25 | End: 2019-07-26

## 2019-07-25 RX ORDER — ACETAMINOPHEN 325 MG/1
650 TABLET ORAL EVERY 4 HOURS PRN
Status: DISCONTINUED | OUTPATIENT
Start: 2019-07-25 | End: 2019-07-29 | Stop reason: HOSPADM

## 2019-07-25 RX ORDER — ROSUVASTATIN CALCIUM 10 MG/1
40 TABLET, COATED ORAL EVERY EVENING
Status: DISCONTINUED | OUTPATIENT
Start: 2019-07-25 | End: 2019-07-29 | Stop reason: HOSPADM

## 2019-07-25 RX ORDER — ASPIRIN 81 MG/1
81 TABLET, CHEWABLE ORAL DAILY
Status: DISCONTINUED | OUTPATIENT
Start: 2019-07-25 | End: 2019-07-29 | Stop reason: HOSPADM

## 2019-07-25 RX ORDER — NICOTINE POLACRILEX 4 MG
15 LOZENGE BUCCAL PRN
Status: DISCONTINUED | OUTPATIENT
Start: 2019-07-25 | End: 2019-07-29 | Stop reason: HOSPADM

## 2019-07-25 RX ORDER — INSULIN GLARGINE 100 [IU]/ML
24 INJECTION, SOLUTION SUBCUTANEOUS NIGHTLY
Status: DISCONTINUED | OUTPATIENT
Start: 2019-07-25 | End: 2019-07-28

## 2019-07-25 RX ORDER — HEPARIN SODIUM 1000 [USP'U]/ML
4000 INJECTION, SOLUTION INTRAVENOUS; SUBCUTANEOUS ONCE
Status: COMPLETED | OUTPATIENT
Start: 2019-07-25 | End: 2019-07-25

## 2019-07-25 RX ORDER — HEPARIN SODIUM 1000 [USP'U]/ML
4000 INJECTION, SOLUTION INTRAVENOUS; SUBCUTANEOUS PRN
Status: DISCONTINUED | OUTPATIENT
Start: 2019-07-25 | End: 2019-07-29 | Stop reason: HOSPADM

## 2019-07-25 RX ORDER — SODIUM CHLORIDE 0.9 % (FLUSH) 0.9 %
10 SYRINGE (ML) INJECTION PRN
Status: DISCONTINUED | OUTPATIENT
Start: 2019-07-25 | End: 2019-07-29 | Stop reason: HOSPADM

## 2019-07-25 RX ORDER — HEPARIN SODIUM 1000 [USP'U]/ML
2000 INJECTION, SOLUTION INTRAVENOUS; SUBCUTANEOUS PRN
Status: DISCONTINUED | OUTPATIENT
Start: 2019-07-25 | End: 2019-07-29 | Stop reason: HOSPADM

## 2019-07-25 RX ORDER — HEPARIN SODIUM 10000 [USP'U]/100ML
9.3 INJECTION, SOLUTION INTRAVENOUS CONTINUOUS
Status: DISCONTINUED | OUTPATIENT
Start: 2019-07-25 | End: 2019-07-28 | Stop reason: SDUPTHER

## 2019-07-25 RX ORDER — RANOLAZINE 500 MG/1
500 TABLET, EXTENDED RELEASE ORAL 2 TIMES DAILY
Status: DISCONTINUED | OUTPATIENT
Start: 2019-07-25 | End: 2019-07-29 | Stop reason: HOSPADM

## 2019-07-25 RX ORDER — DEXTROSE MONOHYDRATE 25 G/50ML
12.5 INJECTION, SOLUTION INTRAVENOUS PRN
Status: DISCONTINUED | OUTPATIENT
Start: 2019-07-25 | End: 2019-07-29 | Stop reason: HOSPADM

## 2019-07-25 RX ORDER — NITROGLYCERIN 0.4 MG/1
0.4 TABLET SUBLINGUAL EVERY 5 MIN PRN
Status: DISCONTINUED | OUTPATIENT
Start: 2019-07-25 | End: 2019-07-29 | Stop reason: HOSPADM

## 2019-07-25 RX ORDER — DEXTROSE MONOHYDRATE 50 MG/ML
100 INJECTION, SOLUTION INTRAVENOUS PRN
Status: DISCONTINUED | OUTPATIENT
Start: 2019-07-25 | End: 2019-07-29 | Stop reason: HOSPADM

## 2019-07-25 RX ORDER — MORPHINE SULFATE 4 MG/ML
2 INJECTION, SOLUTION INTRAMUSCULAR; INTRAVENOUS
Status: DISCONTINUED | OUTPATIENT
Start: 2019-07-25 | End: 2019-07-29 | Stop reason: HOSPADM

## 2019-07-25 RX ADMIN — DILTIAZEM HYDROCHLORIDE 10 MG: 5 INJECTION INTRAVENOUS at 00:23

## 2019-07-25 RX ADMIN — ASPIRIN 81 MG 81 MG: 81 TABLET ORAL at 08:33

## 2019-07-25 RX ADMIN — Medication 10 ML: at 05:15

## 2019-07-25 RX ADMIN — HYDRALAZINE HYDROCHLORIDE 50 MG: 50 TABLET, FILM COATED ORAL at 15:47

## 2019-07-25 RX ADMIN — CARVEDILOL 25 MG: 25 TABLET, FILM COATED ORAL at 08:39

## 2019-07-25 RX ADMIN — HYDRALAZINE HYDROCHLORIDE 50 MG: 50 TABLET, FILM COATED ORAL at 08:33

## 2019-07-25 RX ADMIN — HEPARIN SODIUM 4000 UNITS: 1000 INJECTION, SOLUTION INTRAVENOUS; SUBCUTANEOUS at 05:16

## 2019-07-25 RX ADMIN — RANOLAZINE 500 MG: 500 TABLET, FILM COATED, EXTENDED RELEASE ORAL at 21:42

## 2019-07-25 RX ADMIN — HEPARIN SODIUM 9.3 ML/HR: 10000 INJECTION, SOLUTION INTRAVENOUS at 05:16

## 2019-07-25 RX ADMIN — ROSUVASTATIN CALCIUM 40 MG: 10 TABLET, FILM COATED ORAL at 18:13

## 2019-07-25 RX ADMIN — HYDRALAZINE HYDROCHLORIDE 50 MG: 50 TABLET, FILM COATED ORAL at 21:42

## 2019-07-25 RX ADMIN — Medication 10 ML: at 21:42

## 2019-07-25 RX ADMIN — CARVEDILOL 25 MG: 25 TABLET, FILM COATED ORAL at 18:13

## 2019-07-25 RX ADMIN — CLOPIDOGREL BISULFATE 75 MG: 75 TABLET ORAL at 08:33

## 2019-07-25 RX ADMIN — RANOLAZINE 500 MG: 500 TABLET, FILM COATED, EXTENDED RELEASE ORAL at 08:33

## 2019-07-25 ASSESSMENT — PAIN SCALES - GENERAL
PAINLEVEL_OUTOF10: 0
PAINLEVEL_OUTOF10: 0

## 2019-07-25 NOTE — ED NOTES
SEVERAL IV ATTEMPTS MADE. PT WANT'S TO KNOW IF HE CAN GIVE HIMSELF HIS INSULIN.      Christina Sanchez RN  07/24/19 [id number]

## 2019-07-25 NOTE — PROGRESS NOTES
Per Low-dose heparin drip protocol:    Wt = 90.7 kg  IBW = 68.4 kg    Heparin to be dosed on adjusted body wt = 77.3 kg    Baseline aPTT = 31.4 sec  Bolus dose = 4000 units  (60 units/kg with 4000 unit max)    Initiate drip at 12 units/kg/h = 930 units/h (9.3 ml/h)     First aPTT ordered for 7/25 @ 1400    Adjust drip as needed per the following protocol:    aPTT < 36    Heparin 60 units/kg bolus  Increase infusion by 4 units/kg/hr = 3.1 ml/h  aPTT 36-53.9    Heparin 30 units/kg bolus Increase infusion by 2 units/kg/hr = 1.5 ml/h  aPTT 54-90    No bolus   No change   aPTT 90.1-94    No bolus   Decrease infusion by 2 units/kg/hr = 1.5 ml/h  aPTT 94.1-98    Hold heparin for 1 hour Decrease infusion by 3 units/kg/hr = 2.3 ml/h  aPTT 98.1-103   Hold heparin for 1 hour Decrease infusion by 4 units/kg/hr = 3.1 ml/h  aPTT > 103    Hold heparin for 1 hour Decrease infusion by 6 units/kg/hr = 4.6 ml/h    Obtain aPTT 6-8 hours after bolus and 6 hours after any dose change until two consecutive therapeutic aPTT are achieved- then daily.

## 2019-07-25 NOTE — H&P
Hospital Medicine History & Physical      PCP: No primary care provider on file. Date of Admission: 7/24/2019    Date of Service: Pt seen/examined on 7/25/2019    Chief Complaint:    Chief Complaint   Patient presents with    Chest Pain     CP AND INCREASED BS. History Of Present Illness: The patient is a 48 y.o. male with cardiomyopathy, carotid artery stenosis, chronic combined CHF, CKD, CAD sp CABG, HLD, HTN, DM type 1, and seizure DO  who presented to [place] [address] ED with complaint of chest pain. Patient reports that when he was at work, he started having some low blood sugars. He had security guards help him by giving him some food and then developed high blood sugars. He called his wife and she picked him up and as his blood sugars increased, he developed substernal chest pain, stabbing in nature, rate its 7-8/10. Denies any associated diaphoresis or nauseous but did have some SOB. Reports he tried to get out of the car, and reports that he doesn't remember falling, but remembers hitting the ground. Family reports he was \"out of it\" for ~1 minute so they called EMS. Patient reports that when he arrived to the ER, he was found to be in atrial fibrillation with RVR and hyperglycemic. He states that after getting insulin and medications for the fast heart rate, his chest pain started improving and now rates the pain 1/10.        Past Medical History:        Diagnosis Date    Cardiomyopathy [facility])     Carotid artery stenosis 10/25/2016    SUZANNA stented with 8 x 30 mm non-tapered Xact stent    CHF (congestive heart failure) ([facility] 3/3/15    EF 30%     CKD (chronic kidney disease) stage 2, GFR 60-89 ml/min     Coronary artery disease     sp CABG UC    Diabetic peripheral neuropathy (HCC)     Diastolic HF (heart failure) (HCC)     Hyperlipidemia with target LDL less than 70     Hypertension goal BP (blood pressure) < 130/80     PVD (peripheral vascular disease) (HCC)     Seizures ([facility]     hypoglycemia, + Fall Negative for fever   HENT: Negative for sore throat   Eyes: Negative for redness   Respiratory: + SOB  Cardiovascular: + CP  Gastrointestinal: Negative for vomiting, diarrhea   Genitourinary: Negative for hematuria   Musculoskeletal: Negative for arthralgias   Skin: Negative for rash   Neurological: + syncope   Hematological: Negative for adenopathy   Psychiatric/Behavorial: Negative for anxiety    PHYSICAL EXAM:    BP (!) 169/88   Pulse 69   Temp 98.5 °F (36.9 °C) (Oral)   Resp 18   Ht 5' 8\" (1.727 m)   Wt 194 lb (88 kg)   SpO2 99%   BMI 29.50 kg/m²   Gen: No distress. Alert. Eyes: PERRL. No sclera icterus. No conjunctival injection. ENT: No discharge. Pharynx clear. Neck: No JVD.  + Carotid Bruit. Trachea midline. Resp: No accessory muscle use. No crackles. No wheezes. No rhonchi. CV: Regular rate. Regular rhythm. No murmur. No rub  Capillary Refill: Brisk,< 3 seconds   Peripheral Pulses: +2 palpable, equal bilaterally   GI: Non-tender. Non-distended. No masses. No organomegaly. Normal bowel sounds. No hernia. Skin: Warm and dry. M/S: No cyanosis. No joint deformity. No clubbing. Neuro: Awake. Grossly nonfocal    Psych: Oriented x 3. No anxiety or agitation. PEG Baker have reviewed the chart on [address] [place] [address] and personally interviewed and examined patient, reviewed the data (labs and imaging) and after discussion with my PA formulated the plan. Agree with note with the following edits. HPI: 28-year-old male with a history of cardiomyopathy, carotid artery stenosis, CKD, coronary disease status post CABG, hypertension, type 1 diabetes presented to the emergency room with chest pain. While at work yesterday he started having some low blood sugars. Security guards were helping him but given some food and then he told high blood sugars.   That he called his wife and as she was picking up he started developing substernal chest pain which was BILIRUBINUR Negative   BLOODU Negative   GLUCOSEU 500*        CARDIAC ENZYMES  Recent Labs     07/24/19  2249 07/25/19  0050 07/25/19  0445   TROPONINI <0.01 0.02* 0.12*       CULTURES  None    EKG:  I have reviewed the EKG with the following interpretation:   Atrial fibrillation with RVR,Left atrial enlargementSeptal infarct , age undeterminedT wave abnormality inferolateral leads consider ischemiaAbnormal      RADIOLOGY  XR CHEST PORTABLE   Final Result   No acute findings in the chest.  Stable cardiomegaly. Pertinent previous results reviewed   Echo 5/8/18  Summary   -Normal left ventricle size.   -Overall left ventricular function is decreased with an ejection fraction in   the 40% range. There is hypokinesis of the inferior and lateral walls.   -Diastolic filling parameters suggest grade I diastolic dysfunction.   E/E=24.54.   -Mitral annular calcification is present.   -Mild mitral regurgitation. [facility] 7/2017  SUMMARY:  1. Patent left internal mammary artery to left anterior descending artery. 2.  Patent saphenous vein graft to obtuse marginal 2.  3.  Non-occluded saphenous vein graft to the obtuse marginal.  4.  Right coronary artery in the posterolateral branch with 90% stenosis   present. 5.  Occluded left anterior descending artery in the proximal segment. 6.  Occluded mid left circumflex.     ASSESSMENT/PLAN:  NSTEMI   - PCU with tele   - troponins trending up, initial <0.01-->0.02-->0.12  - Cardiology consult   - Heparin gtt   - Plan for possible LHC tomorrow, NPO after midnight tonight     New onset Atrial fibrillation with RVR   - PCU with tele  - Continue diltiazem gtt   - Heparin for AC given NSTEMI as well   - Echo   - Continue BB as well   - Now in sinus rhythm     Hyperkalemia   - Was elevated to 5.8  - Received insulin in the ED   - Resolved     CAD s/p CABG  Ischemic Cardiomyopathy   HTN  - extensive heart history, Follows with Dr. Alexander Sharpe   - Continue ASA, Plavix,

## 2019-07-25 NOTE — CONSULTS
DAILY 2/13/19  Yes Lisa Jenkins MD   HUMALOG 100 UNIT/ML injection vial INJECT 50 TO 60 UNITS UNDER THE SKIN INTO INSULIN PUMP DAILY 11/12/18  Yes Lisa Jenkins MD   NONFORMULARY Insulin pump --Humolog   Yes Historical Provider, MD   rosuvastatin (CRESTOR) 40 MG tablet TAKE ONE TABLET BY MOUTH EVERY EVENING 8/31/18  Yes Damaris Puga MD   ranolazine (RANEXA) 500 MG extended release tablet Take 1 tablet by mouth 2 times daily 8/31/18  Yes Damaris Puga MD   furosemide (LASIX) 40 MG tablet Take 1 tablet by mouth daily 8/31/18  Yes Damaris Puga MD   nitroGLYCERIN (NITROSTAT) 0.4 MG SL tablet Place 1 tablet under the tongue every 5 minutes as needed for Chest pain 8/22/18  Yes Damaris Puga MD   clopidogrel (PLAVIX) 75 MG tablet Take 1 tablet by mouth daily 6/28/18  Yes Damaris Puga MD   Continuous Blood Gluc Sensor ([address] [place] [address]) MISC Every 10 days 6/1/18  Yes Lisa Jenkins MD   Elastic Bandages & Supports (MEDICAL COMPRESSION STOCKINGS) MISC 15-20 mmHg compression, wear daily during the day 5/24/18  Yes Damaris Puga MD   Blood Glucose Monitoring Suppl (EASY TOUCH GLUCOSE SYSTEM) w/Device KIT Use four times a day and as needed 5/10/18  Yes Thomas Jose MD   EASY TOUCH LANCETS 32G MISC Use 3 times daily with meals and at bed time 5/10/18  Yes Thomas Jose MD   glucose blood VI test strips (EASY TOUCH TEST) strip 1 each by In Vitro route 4 times daily 5/10/18  Yes Thomas Jose MD   aspirin 81 MG tablet Take 1 tablet by mouth daily 10/26/16  Yes Clive Thomas, APRN - CNP   Multiple Vitamins-Minerals (THERAPEUTIC MULTIVITAMIN-MINERALS) tablet Take 1 tablet by mouth daily.    Yes Historical Provider, MD   Continuous Blood Gluc  (FREESTYLE SAL READER) BEHZAD As needed 6/1/18   Lisa Jenkins MD   insulin lispro (HUMALOG KWIKPEN) 100 UNIT/ML pen Inject 6 Units into the skin 3 times daily (before meals) 5/10/18   Thomas Jose MD   Insulin Pen Needle 31G Diagnosis    DKA (diabetic ketoacidoses) ([facility] [address]    Coronary artery disease due to lipid rich plaque    DM type 1 (diabetes mellitus, type 1) ([facility])    Acute kidney injury superimposed on chronic kidney disease (HCC)    PNA (pneumonia)    CRI (chronic renal insufficiency) ([facility])    CHF (congestive heart failure) ([facility])    Hypoglycemia    Diastolic HF (heart failure) ([facility])    Hyperlipidemia with target LDL less than 70    Hypertension    CKD (chronic kidney disease) stage 2, GFR 60-89 ml/min    Type 1 diabetes mellitus with chronic kidney disease ([facility])    Diabetic peripheral neuropathy ([facility])    Carotid artery stenosis    PVD (peripheral vascular disease) ([facility] [address]    Diabetic nephropathy associated with type 1 diabetes mellitus ([address]    Ischemic chest pain    Stenosis of right carotid artery    Carotid artery calcification    H/O carotid angioplasty    Pure hypercholesterolemia    Abnormal cardiovascular stress test    Acute encephalopathy    Generalized idiopathic epilepsy, not intractable, without status epilepticus ([facility] [address]    Encephalopathy    Non-STEMI (non-ST elevated myocardial infarction) ([facility] [address]    HTN (hypertension), benign    S/P CABG (coronary artery bypass graft)    Ischemic cardiomyopathy    Venous insufficiency of both lower extremities    Atrial fibrillation with RVR ([facility] [address]       Thank you for allowing to us to participate in the care or Delmy Carreno. Further evaluation will be based upon the patient's clinical course and testing results.

## 2019-07-25 NOTE — ED PROVIDER NOTES
is around baseline of 4. Troponin negative. EKG with new onset atrial fibrillation with rapid ventricular response. Discussed with patient and his wife. They report patient has been more short of breath over the past few weeks. Denies history of atrial fibrillation. Not on any anticoagulation. With uncontrolled diabetes, atrial fibrillation with RVR it is new in onset believe patient warrants admission for further evaluation and treatment. Will give 10 units of insulin IV and placed on Cardizem drip. Will discuss with the hospitalist at [facility] for admission. Discussed with Dr. Uday Rodriguez who accepts admission and request repeat troponin, BMP and lactate. Patient cardioverted while here in the emergency department. Repeat EKG shows normal sinus rhythm. Waiting for transfer. ED crit    CRITICAL CARE:  The high probability of sudden, clinically significant deterioration in the patient's condition required the highest level of my preparedness to intervene urgently. The services I provided to this patient were to treat and/or prevent clinically significant deterioration that could result in:cardiovascular collapse. Services included the following: chart data review, reviewing nursing notes and/or old charts, documentation time, consultant collaboration regarding findings and treatment options, medication orders and management, direct patient care, re-evaluations, vital sign assessments and ordering, interpreting and reviewing diagnostic studies/lab tests. Aggregate critical care time was 35 minutes, which includes only time during which I was engaged in work directly related to the patient's care, as described above, whether at the bedside or elsewhere in the Emergency Department. It did not include time spent performing other reported procedures or the services of residents, students, nurses or physician assistants. CONSULTS:  IP CONSULT TO CARDIOLOGY      FINAL IMPRESSION      1.  Atrial

## 2019-07-25 NOTE — PROGRESS NOTES
Pharmacy note:  Heparin infusion  APTT = 77.0 seconds:  within goal range of 54 - 90 seconds. Per protocol, continue current heparin rate of 9.3 ml/hr. Recheck aPTT in 6 hours. Acosta Perrin Pharm. D. 7/25/2019 2:20 PM

## 2019-07-25 NOTE — ED NOTES
Bed: 03  Expected date:   Expected time:   Means of arrival:   Comments:  [facility]     Angelica Espana RN  07/24/19 8827

## 2019-07-25 NOTE — PLAN OF CARE
Problem: Falls - Risk of:  Goal: Will remain free from falls  Description  Will remain free from falls  Outcome: Ongoing  Goal: Absence of physical injury  Description  Absence of physical injury  Outcome: Ongoing     Problem: OXYGENATION/RESPIRATORY FUNCTION  Goal: Patient will maintain patent airway  Outcome: Ongoing  Goal: Patient will achieve/maintain normal respiratory rate/effort  Description  Respiratory rate and effort will be within normal limits for the patient  Outcome: Ongoing     Problem: HEMODYNAMIC STATUS  Goal: Patient has stable vital signs and fluid balance  Outcome: Ongoing     Problem: FLUID AND ELECTROLYTE IMBALANCE  Goal: Fluid and electrolyte balance are achieved/maintained  Outcome: Ongoing     Problem: ACTIVITY INTOLERANCE/IMPAIRED MOBILITY  Goal: Mobility/activity is maintained at optimum level for patient  Outcome: Ongoing     Problem: SAFETY  Goal: Free from accidental physical injury  Outcome: Ongoing     Problem: DAILY CARE  Goal: Daily care needs are met  Outcome: Ongoing     Problem: PAIN  Goal: Patient's pain/discomfort is manageable  Outcome: Ongoing     Problem: SKIN INTEGRITY  Goal: Skin integrity is maintained or improved  Outcome: Ongoing     Problem: KNOWLEDGE DEFICIT  Goal: Patient/S.O. demonstrates understanding of disease process, treatment plan, medications, and discharge instructions.   Outcome: Ongoing     Problem: DISCHARGE BARRIERS  Goal: Patient's continuum of care needs are met  Outcome: Ongoing

## 2019-07-25 NOTE — CONSULTS
mouth 3 times daily 90 tablet 5    NIFEdipine (PROCARDIA XL) 60 MG extended release tablet TAKE ONE TABLET BY MOUTH DAILY 30 tablet 3    HUMALOG 100 UNIT/ML injection vial INJECT 50 TO 60 UNITS UNDER THE SKIN INTO INSULIN PUMP DAILY 2 vial 3    NONFORMULARY Insulin pump --Humolog      rosuvastatin (CRESTOR) 40 MG tablet TAKE ONE TABLET BY MOUTH EVERY EVENING 90 tablet 3    ranolazine (RANEXA) 500 MG extended release tablet Take 1 tablet by mouth 2 times daily 180 tablet 3    furosemide (LASIX) 40 MG tablet Take 1 tablet by mouth daily 90 tablet 3    nitroGLYCERIN (NITROSTAT) 0.4 MG SL tablet Place 1 tablet under the tongue every 5 minutes as needed for Chest pain 25 tablet 3    clopidogrel (PLAVIX) 75 MG tablet Take 1 tablet by mouth daily 90 tablet 3    Continuous Blood Gluc Sensor (FREESTYLE SAL SENSOR SYSTEM) MISC Every 10 days 3 each 2    Elastic Bandages & Supports (MEDICAL COMPRESSION STOCKINGS) MISC 15-20 mmHg compression, wear daily during the day 1 each 1    Blood Glucose Monitoring Suppl (EASY TOUCH GLUCOSE SYSTEM) w/Device KIT Use four times a day and as needed 1 kit 0    EASY TOUCH LANCETS 32G MISC Use 3 times daily with meals and at bed time 100 each 6    glucose blood VI test strips (EASY TOUCH TEST) strip 1 each by In Vitro route 4 times daily 100 each 6    aspirin 81 MG tablet Take 1 tablet by mouth daily 90 tablet 3    Multiple Vitamins-Minerals (THERAPEUTIC MULTIVITAMIN-MINERALS) tablet Take 1 tablet by mouth daily.       Continuous Blood Gluc  (FREESTYLE SAL READER) BEHZAD As needed 1 Device 0    insulin lispro (HUMALOG KWIKPEN) 100 UNIT/ML pen Inject 6 Units into the skin 3 times daily (before meals) 1 pen 3    Insulin Pen Needle 31G X 5 MM MISC 1 each by Does not apply route 4 times daily 100 each 6    insulin glargine (LANTUS SOLOSTAR) 100 UNIT/ML injection pen Inject 30 Units into the skin nightly (Patient taking differently: Inject 24 Units into the skin nightly ) high risk of contrast induced jerry with likelihood for needing dialysis post cath and they understand the risk. They also understand that his heart health is of utmost importance at this time   -no IVF at present   -hydralazine has been resumed and coreg  -Serial renal panel  -daily wts and strict i/o  -renal dose medications   -avoid nephrotoxins    Will follow along    Thank you for the consultation. Please do not hesitate to call with questions.     Trisha Valdes  [facility]  Office: [phone]  Fax:    [phone]

## 2019-07-26 LAB
ALBUMIN SERPL-MCNC: 3.6 G/DL (ref 3.4–5)
ANION GAP SERPL CALCULATED.3IONS-SCNC: 12 MMOL/L (ref 3–16)
APTT: 62.4 SEC (ref 26–36)
BUN BLDV-MCNC: 47 MG/DL (ref 7–20)
CALCIUM SERPL-MCNC: 8.5 MG/DL (ref 8.3–10.6)
CHLORIDE BLD-SCNC: 100 MMOL/L (ref 99–110)
CO2: 24 MMOL/L (ref 21–32)
CREAT SERPL-MCNC: 4.7 MG/DL (ref 0.9–1.3)
GFR AFRICAN AMERICAN: 16
GFR NON-AFRICAN AMERICAN: 13
GLUCOSE BLD-MCNC: 125 MG/DL (ref 70–99)
GLUCOSE BLD-MCNC: 129 MG/DL (ref 70–99)
GLUCOSE BLD-MCNC: 150 MG/DL (ref 70–99)
GLUCOSE BLD-MCNC: 268 MG/DL (ref 70–99)
GLUCOSE BLD-MCNC: 282 MG/DL (ref 70–99)
GLUCOSE BLD-MCNC: 290 MG/DL (ref 70–99)
PERFORMED ON: ABNORMAL
PHOSPHORUS: 3.6 MG/DL (ref 2.5–4.9)
POTASSIUM SERPL-SCNC: 3.8 MMOL/L (ref 3.5–5.1)
SODIUM BLD-SCNC: 136 MMOL/L (ref 136–145)

## 2019-07-26 PROCEDURE — 6370000000 HC RX 637 (ALT 250 FOR IP): Performed by: INTERNAL MEDICINE

## 2019-07-26 PROCEDURE — 99232 SBSQ HOSP IP/OBS MODERATE 35: CPT | Performed by: INTERNAL MEDICINE

## 2019-07-26 PROCEDURE — 36415 COLL VENOUS BLD VENIPUNCTURE: CPT

## 2019-07-26 PROCEDURE — 85730 THROMBOPLASTIN TIME PARTIAL: CPT

## 2019-07-26 PROCEDURE — 80069 RENAL FUNCTION PANEL: CPT

## 2019-07-26 PROCEDURE — 2060000000 HC ICU INTERMEDIATE R&B

## 2019-07-26 PROCEDURE — 2500000003 HC RX 250 WO HCPCS: Performed by: INTERNAL MEDICINE

## 2019-07-26 PROCEDURE — 6360000002 HC RX W HCPCS: Performed by: INTERNAL MEDICINE

## 2019-07-26 PROCEDURE — 2580000003 HC RX 258: Performed by: INTERNAL MEDICINE

## 2019-07-26 PROCEDURE — 99233 SBSQ HOSP IP/OBS HIGH 50: CPT | Performed by: INTERNAL MEDICINE

## 2019-07-26 RX ORDER — RANOLAZINE 500 MG/1
500 TABLET, EXTENDED RELEASE ORAL 2 TIMES DAILY
Status: CANCELLED | OUTPATIENT
Start: 2019-07-26

## 2019-07-26 RX ORDER — HEPARIN SODIUM 1000 [USP'U]/ML
2000 INJECTION, SOLUTION INTRAVENOUS; SUBCUTANEOUS PRN
Status: CANCELLED | OUTPATIENT
Start: 2019-07-26

## 2019-07-26 RX ORDER — DIPHENHYDRAMINE HCL 25 MG
25 TABLET ORAL 2 TIMES DAILY
Status: DISCONTINUED | OUTPATIENT
Start: 2019-07-28 | End: 2019-07-29 | Stop reason: HOSPADM

## 2019-07-26 RX ORDER — SODIUM CHLORIDE 0.9 % (FLUSH) 0.9 %
10 SYRINGE (ML) INJECTION PRN
Status: CANCELLED | OUTPATIENT
Start: 2019-07-26

## 2019-07-26 RX ORDER — ACETAMINOPHEN 325 MG/1
650 TABLET ORAL EVERY 4 HOURS PRN
Status: CANCELLED | OUTPATIENT
Start: 2019-07-26

## 2019-07-26 RX ORDER — INSULIN GLARGINE 100 [IU]/ML
24 INJECTION, SOLUTION SUBCUTANEOUS NIGHTLY
Status: CANCELLED | OUTPATIENT
Start: 2019-07-26

## 2019-07-26 RX ORDER — CLOPIDOGREL BISULFATE 75 MG/1
75 TABLET ORAL DAILY
Status: CANCELLED | OUTPATIENT
Start: 2019-07-27

## 2019-07-26 RX ORDER — HEPARIN SODIUM 1000 [USP'U]/ML
4000 INJECTION, SOLUTION INTRAVENOUS; SUBCUTANEOUS PRN
Status: CANCELLED | OUTPATIENT
Start: 2019-07-26

## 2019-07-26 RX ORDER — LABETALOL HYDROCHLORIDE 5 MG/ML
10 INJECTION, SOLUTION INTRAVENOUS EVERY 6 HOURS PRN
Status: DISCONTINUED | OUTPATIENT
Start: 2019-07-26 | End: 2019-07-29 | Stop reason: HOSPADM

## 2019-07-26 RX ORDER — HYDRALAZINE HYDROCHLORIDE 50 MG/1
100 TABLET, FILM COATED ORAL 3 TIMES DAILY
Status: CANCELLED | OUTPATIENT
Start: 2019-07-26

## 2019-07-26 RX ORDER — MORPHINE SULFATE 4 MG/ML
2 INJECTION, SOLUTION INTRAMUSCULAR; INTRAVENOUS
Status: CANCELLED | OUTPATIENT
Start: 2019-07-26

## 2019-07-26 RX ORDER — NICOTINE POLACRILEX 4 MG
15 LOZENGE BUCCAL PRN
Status: CANCELLED | OUTPATIENT
Start: 2019-07-26

## 2019-07-26 RX ORDER — DEXTROSE MONOHYDRATE 50 MG/ML
100 INJECTION, SOLUTION INTRAVENOUS PRN
Status: CANCELLED | OUTPATIENT
Start: 2019-07-26

## 2019-07-26 RX ORDER — PREDNISONE 10 MG/1
40 TABLET ORAL EVERY 8 HOURS
Status: DISCONTINUED | OUTPATIENT
Start: 2019-07-28 | End: 2019-07-29 | Stop reason: HOSPADM

## 2019-07-26 RX ORDER — HEPARIN SODIUM 10000 [USP'U]/100ML
9.3 INJECTION, SOLUTION INTRAVENOUS CONTINUOUS
Status: CANCELLED | OUTPATIENT
Start: 2019-07-26 | Stop reason: SDUPTHER

## 2019-07-26 RX ORDER — SODIUM CHLORIDE 9 MG/ML
INJECTION, SOLUTION INTRAVENOUS CONTINUOUS
Status: CANCELLED | OUTPATIENT
Start: 2019-07-26

## 2019-07-26 RX ORDER — CARVEDILOL 25 MG/1
25 TABLET ORAL 2 TIMES DAILY WITH MEALS
Status: CANCELLED | OUTPATIENT
Start: 2019-07-26

## 2019-07-26 RX ORDER — ONDANSETRON 2 MG/ML
4 INJECTION INTRAMUSCULAR; INTRAVENOUS EVERY 6 HOURS PRN
Status: CANCELLED | OUTPATIENT
Start: 2019-07-26

## 2019-07-26 RX ORDER — SODIUM CHLORIDE 0.9 % (FLUSH) 0.9 %
10 SYRINGE (ML) INJECTION EVERY 12 HOURS SCHEDULED
Status: CANCELLED | OUTPATIENT
Start: 2019-07-26

## 2019-07-26 RX ORDER — FAMOTIDINE 20 MG/1
20 TABLET, FILM COATED ORAL 2 TIMES DAILY
Status: CANCELLED | OUTPATIENT
Start: 2019-07-28

## 2019-07-26 RX ORDER — DIPHENHYDRAMINE HCL 25 MG
25 TABLET ORAL 2 TIMES DAILY
Status: CANCELLED | OUTPATIENT
Start: 2019-07-28

## 2019-07-26 RX ORDER — FAMOTIDINE 20 MG/1
20 TABLET, FILM COATED ORAL 2 TIMES DAILY
Status: DISCONTINUED | OUTPATIENT
Start: 2019-07-28 | End: 2019-07-29 | Stop reason: HOSPADM

## 2019-07-26 RX ORDER — NITROGLYCERIN 0.4 MG/1
0.4 TABLET SUBLINGUAL EVERY 5 MIN PRN
Status: CANCELLED | OUTPATIENT
Start: 2019-07-26

## 2019-07-26 RX ORDER — ROSUVASTATIN CALCIUM 10 MG/1
40 TABLET, COATED ORAL EVERY EVENING
Status: CANCELLED | OUTPATIENT
Start: 2019-07-26

## 2019-07-26 RX ORDER — ASPIRIN 81 MG/1
81 TABLET, CHEWABLE ORAL DAILY
Status: CANCELLED | OUTPATIENT
Start: 2019-07-27

## 2019-07-26 RX ORDER — DEXTROSE MONOHYDRATE 25 G/50ML
12.5 INJECTION, SOLUTION INTRAVENOUS PRN
Status: CANCELLED | OUTPATIENT
Start: 2019-07-26

## 2019-07-26 RX ORDER — AMLODIPINE BESYLATE 5 MG/1
5 TABLET ORAL DAILY
Status: CANCELLED | OUTPATIENT
Start: 2019-07-27

## 2019-07-26 RX ORDER — HYDRALAZINE HYDROCHLORIDE 50 MG/1
100 TABLET, FILM COATED ORAL 3 TIMES DAILY
Status: DISCONTINUED | OUTPATIENT
Start: 2019-07-26 | End: 2019-07-29 | Stop reason: HOSPADM

## 2019-07-26 RX ORDER — FAMOTIDINE 20 MG/1
40 TABLET, FILM COATED ORAL 2 TIMES DAILY
Status: DISCONTINUED | OUTPATIENT
Start: 2019-07-28 | End: 2019-07-26

## 2019-07-26 RX ORDER — AMLODIPINE BESYLATE 5 MG/1
5 TABLET ORAL DAILY
Status: DISCONTINUED | OUTPATIENT
Start: 2019-07-26 | End: 2019-07-27

## 2019-07-26 RX ORDER — LABETALOL HYDROCHLORIDE 5 MG/ML
10 INJECTION, SOLUTION INTRAVENOUS EVERY 6 HOURS PRN
Status: CANCELLED | OUTPATIENT
Start: 2019-07-26

## 2019-07-26 RX ORDER — HYDRALAZINE HYDROCHLORIDE 25 MG/1
25 TABLET, FILM COATED ORAL ONCE
Status: COMPLETED | OUTPATIENT
Start: 2019-07-26 | End: 2019-07-26

## 2019-07-26 RX ORDER — PREDNISONE 20 MG/1
40 TABLET ORAL EVERY 8 HOURS
Status: CANCELLED | OUTPATIENT
Start: 2019-07-28

## 2019-07-26 RX ADMIN — RANOLAZINE 500 MG: 500 TABLET, FILM COATED, EXTENDED RELEASE ORAL at 10:16

## 2019-07-26 RX ADMIN — HEPARIN SODIUM 9.3 ML/HR: 10000 INJECTION, SOLUTION INTRAVENOUS at 03:13

## 2019-07-26 RX ADMIN — CLOPIDOGREL BISULFATE 75 MG: 75 TABLET ORAL at 10:16

## 2019-07-26 RX ADMIN — HYDRALAZINE HYDROCHLORIDE 100 MG: 50 TABLET, FILM COATED ORAL at 10:16

## 2019-07-26 RX ADMIN — AMLODIPINE BESYLATE 5 MG: 5 TABLET ORAL at 10:17

## 2019-07-26 RX ADMIN — HYDRALAZINE HYDROCHLORIDE 25 MG: 25 TABLET, FILM COATED ORAL at 05:15

## 2019-07-26 RX ADMIN — CARVEDILOL 25 MG: 25 TABLET, FILM COATED ORAL at 10:16

## 2019-07-26 RX ADMIN — Medication 10 ML: at 10:17

## 2019-07-26 RX ADMIN — LABETALOL HYDROCHLORIDE 10 MG: 5 INJECTION, SOLUTION INTRAVENOUS at 03:42

## 2019-07-26 RX ADMIN — ASPIRIN 81 MG 81 MG: 81 TABLET ORAL at 10:17

## 2019-07-26 RX ADMIN — RANOLAZINE 500 MG: 500 TABLET, FILM COATED, EXTENDED RELEASE ORAL at 20:33

## 2019-07-26 RX ADMIN — CARVEDILOL 25 MG: 25 TABLET, FILM COATED ORAL at 16:41

## 2019-07-26 RX ADMIN — HYDRALAZINE HYDROCHLORIDE 100 MG: 50 TABLET, FILM COATED ORAL at 20:33

## 2019-07-26 RX ADMIN — ROSUVASTATIN CALCIUM 40 MG: 10 TABLET, FILM COATED ORAL at 16:41

## 2019-07-26 RX ADMIN — HYDRALAZINE HYDROCHLORIDE 100 MG: 50 TABLET, FILM COATED ORAL at 13:27

## 2019-07-26 ASSESSMENT — PAIN SCALES - GENERAL
PAINLEVEL_OUTOF10: 0

## 2019-07-26 NOTE — PROGRESS NOTES
picked him up to go home. Upon getting out of car he felt dizzy and everything \"went black\" and passed out. Over last 1 year c/o exertional SSCP, sharp, no radiation, assoc with SOB. . Over last 2-3 weeks c/o increased MOISE. Admit EKG revealed new onset AFIB with RVR; ST change inferolateral leads consider ischemia; septal infarct, CXR negative with stable mild cardiomegaly. He received IV Diltiazem bolus with drip administration and he converted in ED and remains in SR; Yenifer<0.01, 0.02; 0.12; BUN/Cr=42/4.5 (similar to 5/18). Diagnosis of new-onset rapid afib, syncope, and NSTEMI in middle-aged male with extensive CAD history, DM, HTN, HLD, and CRI. Possible vasovagal syncope and +orthostatic upon admit to PCU.     Recs:  1. I personally reviewed ECHO from 7/25/19 showing EF=35-40%; anteroseptal HK; dilated LV; grade I DD nml filling pressure  2. Continue heparin gtt for [facility] for new-onset afib and NSTEMI. CHADs-vasc=4. Tele with NSR currently. 3. Continue crestor 40mg qd, ranexa 500mg BID, plavix 75mg qd, hydralazine 50mg TID, coreg 25mg BID, and baby aspirin qd.  4. NO ACE-I due to CRI. 5. I discussed case with his regular cards Dr. Komal Moss. Given exertional CP and SOB symptoms, extensive CAD history, recent NSTEMI, and NSVT nased on these findings I recommend left heart cath for definitive evaluation of coronary arteries. Risks, benefits, expectations, and alternative treatments were discussed. Questions appropriately answered. Vanessa Pineda agrees to proceed and verbalized understanding. Schedule C Monday in order to lower BP more. 6. Renal Dr. Emily Arias believes he is high risk for HD after cath given CRI and patient is aware and willing to proceed with HD if needed after cath. 7. Suboptimal blood pressure control and will increase hydralazine 100mg TID and add norvasc 5mg daily.   8. IV dye allergy and will d/w interventionalist and get recs regarding prophylaxis regimen.      Patient Active

## 2019-07-26 NOTE — PROGRESS NOTES
Patient now completed with bath as requested to wait on medications till s/o gave pt bath. Patient is alert and oriented. Denies complaints of pain or discomfort. Heparin infusing without difficulty. Shift assessment completed, see flow sheet. Pt aware of [facility] planned for Monday at this time. Denies further needs. Will cont to monitor. Call light in reach. S/O at bedside.

## 2019-07-26 NOTE — PROGRESS NOTES
Admit: 2019    Name:  Justin Pompa  Room:  [facility]  MRN:    [id number]     Daily Progress Note for 2019     Interval History:     Denies chest pain    Scheduled Meds:   hydrALAZINE  100 mg Oral TID    amLODIPine  5 mg Oral Daily    insulin glargine  24 Units Subcutaneous Nightly    aspirin  81 mg Oral Daily    carvedilol  25 mg Oral BID WC    clopidogrel  75 mg Oral Daily    ranolazine  500 mg Oral BID    rosuvastatin  40 mg Oral QPM    sodium chloride flush  10 mL Intravenous 2 times per day    insulin lispro  0-6 Units Subcutaneous TID WC    insulin lispro  0-3 Units Subcutaneous Nightly       Continuous Infusions:   dextrose      heparin (porcine) 9.3 mL/hr (19 0313)       PRN Meds:  labetalol, nitroGLYCERIN, sodium chloride flush, ondansetron, glucose, dextrose, glucagon (rDNA), dextrose, acetaminophen, morphine, heparin (porcine), heparin (porcine)                  Objective:     Temp  Av.9 °F (36.6 °C)  Min: 97 °F (36.1 °C)  Max: 98.4 °F (36.9 °C)  Pulse  Av.8  Min: 76  Max: 84  BP  Min: 140/78  Max: 204/89  SpO2  Av.7 %  Min: 98 %  Max: 99 %  No data found. Intake/Output Summary (Last 24 hours) at 2019 1013  Last data filed at 2019 0943  Gross per 24 hour   Intake 480 ml   Output 2000 ml   Net -1520 ml       Physical Exam:  General:  Awake, alert and oriented. Appears to be not in any distress  Mucous Membranes:  Pink , anicteric  Neck: No JVD, no carotid bruit, no thyromegaly  Chest:  Clear to auscultation bilaterally, no added sounds  Cardiovascular:  RRR S1S2 heard, no murmurs or gallops  Abdomen:  Soft, undistended, non tender, no organomegaly, BS present  Extremities: No edema or cyanosis.  Distal pulses well felt  Neurological : no focal deficits    Lab Data:  CBC:   Recent Labs     19  2249 19  0444   WBC 5.5 5.9   RBC 6.45* 6.18*   HGB 17.9* 17.4   HCT 56.5* 53.6*   MCV 87.7 86.7   RDW 15.5* 15.2   * 138     BMP: Abnormal cardiovascular stress test 07/06/2017    Carotid artery calcification     H/O carotid angioplasty     Pure hypercholesterolemia     Stenosis of right carotid artery     Chest pain 05/18/2016    Diabetic nephropathy associated with type 1 diabetes mellitus ([facility] 13/05/4783    Diastolic HF (heart failure) ([facility])     Hyperlipidemia with target LDL less than 70     Hypertension     CKD (chronic kidney disease) stage 2, GFR 60-89 ml/min     Type 1 diabetes mellitus with chronic kidney disease ([facility])     Diabetic peripheral neuropathy ([facility])     Carotid artery stenosis     PVD (peripheral vascular disease) ([facility])     Hypoglycemia     CHF (congestive heart failure) ([facility])     CRI (chronic renal insufficiency) ([facility]) 03/03/2015    PNA (pneumonia) 03/02/2015    Coronary artery disease involving native heart with angina pectoris ([facility] 05/26/2010    DM type 1 (diabetes mellitus, type 1) ([facility] 05/26/2010     NSTEMI   - PCU with tele   - troponins trending up, initial <0.01-->0.02-->0.12  - Cardiology consult   - Heparin gtt   - Plan for possible [facility] Monday  Will need to start on steroids and  benadryl Sunday given h/o allergy to dye     New onset Atrial fibrillation with RVR   - PCU with tele  - Continue diltiazem gtt   - Heparin for [facility] given NSTEMI as well   - Echo   - Continue BB as well   - Now in sinus rhythm      Hyperkalemia   - Was elevated to 5.8  - Received insulin in the ED   - Resolved      CAD s/p CABG  Ischemic Cardiomyopathy   HTN  - extensive heart history, Follows with Dr. Cesario Gregg   - Continue ASA, Plavix, Ranexa, BB  - BP is mildly elevated, increase dose of hydralazine     CKD Stage IV  - Has nephrologist but reports he has not followed up in a while   - Baseline Cr ~4.1- 4.5  - Creatinine on admission 4.4  - Continue to monitor   - Nephrology consult for recommendations for possible cath   Family and patient understand risk of worsening of renal function.     DM type 1   -

## 2019-07-27 LAB
ALBUMIN SERPL-MCNC: 3.7 G/DL (ref 3.4–5)
ANION GAP SERPL CALCULATED.3IONS-SCNC: 12 MMOL/L (ref 3–16)
APTT: 73.7 SEC (ref 26–36)
BUN BLDV-MCNC: 49 MG/DL (ref 7–20)
CALCIUM SERPL-MCNC: 9 MG/DL (ref 8.3–10.6)
CHLORIDE BLD-SCNC: 102 MMOL/L (ref 99–110)
CO2: 25 MMOL/L (ref 21–32)
CREAT SERPL-MCNC: 4.8 MG/DL (ref 0.9–1.3)
GFR AFRICAN AMERICAN: 16
GFR NON-AFRICAN AMERICAN: 13
GLUCOSE BLD-MCNC: 136 MG/DL (ref 70–99)
GLUCOSE BLD-MCNC: 142 MG/DL (ref 70–99)
GLUCOSE BLD-MCNC: 153 MG/DL (ref 70–99)
GLUCOSE BLD-MCNC: 188 MG/DL (ref 70–99)
GLUCOSE BLD-MCNC: 219 MG/DL (ref 70–99)
GLUCOSE BLD-MCNC: 268 MG/DL (ref 70–99)
GLUCOSE BLD-MCNC: 350 MG/DL (ref 70–99)
GLUCOSE BLD-MCNC: 76 MG/DL (ref 70–99)
GLUCOSE BLD-MCNC: 98 MG/DL (ref 70–99)
PERFORMED ON: ABNORMAL
PERFORMED ON: NORMAL
PHOSPHORUS: 4.1 MG/DL (ref 2.5–4.9)
PLATELET # BLD: 108 K/UL (ref 135–450)
POTASSIUM SERPL-SCNC: 5.1 MMOL/L (ref 3.5–5.1)
SODIUM BLD-SCNC: 139 MMOL/L (ref 136–145)

## 2019-07-27 PROCEDURE — 80069 RENAL FUNCTION PANEL: CPT

## 2019-07-27 PROCEDURE — 99233 SBSQ HOSP IP/OBS HIGH 50: CPT | Performed by: INTERNAL MEDICINE

## 2019-07-27 PROCEDURE — 2060000000 HC ICU INTERMEDIATE R&B

## 2019-07-27 PROCEDURE — 36415 COLL VENOUS BLD VENIPUNCTURE: CPT

## 2019-07-27 PROCEDURE — 6370000000 HC RX 637 (ALT 250 FOR IP): Performed by: INTERNAL MEDICINE

## 2019-07-27 PROCEDURE — 85730 THROMBOPLASTIN TIME PARTIAL: CPT

## 2019-07-27 PROCEDURE — 6360000002 HC RX W HCPCS: Performed by: INTERNAL MEDICINE

## 2019-07-27 PROCEDURE — 85049 AUTOMATED PLATELET COUNT: CPT

## 2019-07-27 PROCEDURE — 2500000003 HC RX 250 WO HCPCS: Performed by: INTERNAL MEDICINE

## 2019-07-27 PROCEDURE — 2580000003 HC RX 258: Performed by: INTERNAL MEDICINE

## 2019-07-27 RX ORDER — AMLODIPINE BESYLATE 5 MG/1
10 TABLET ORAL DAILY
Status: DISCONTINUED | OUTPATIENT
Start: 2019-07-28 | End: 2019-07-29 | Stop reason: HOSPADM

## 2019-07-27 RX ORDER — AMLODIPINE BESYLATE 5 MG/1
5 TABLET ORAL DAILY
Status: DISCONTINUED | OUTPATIENT
Start: 2019-07-27 | End: 2019-07-27

## 2019-07-27 RX ORDER — ISOSORBIDE DINITRATE 20 MG/1
20 TABLET ORAL 3 TIMES DAILY
Status: DISCONTINUED | OUTPATIENT
Start: 2019-07-27 | End: 2019-07-29 | Stop reason: HOSPADM

## 2019-07-27 RX ORDER — AMLODIPINE BESYLATE 5 MG/1
5 TABLET ORAL ONCE
Status: COMPLETED | OUTPATIENT
Start: 2019-07-27 | End: 2019-07-27

## 2019-07-27 RX ADMIN — ASPIRIN 81 MG 81 MG: 81 TABLET ORAL at 08:51

## 2019-07-27 RX ADMIN — HYDRALAZINE HYDROCHLORIDE 100 MG: 50 TABLET, FILM COATED ORAL at 08:51

## 2019-07-27 RX ADMIN — HEPARIN SODIUM 9.3 ML/HR: 10000 INJECTION, SOLUTION INTRAVENOUS at 08:52

## 2019-07-27 RX ADMIN — PREDNISONE 40 MG: 10 TABLET ORAL at 23:45

## 2019-07-27 RX ADMIN — ISOSORBIDE DINITRATE 20 MG: 20 TABLET ORAL at 10:40

## 2019-07-27 RX ADMIN — RANOLAZINE 500 MG: 500 TABLET, FILM COATED, EXTENDED RELEASE ORAL at 08:51

## 2019-07-27 RX ADMIN — LABETALOL HYDROCHLORIDE 10 MG: 5 INJECTION, SOLUTION INTRAVENOUS at 04:44

## 2019-07-27 RX ADMIN — CARVEDILOL 25 MG: 25 TABLET, FILM COATED ORAL at 08:51

## 2019-07-27 RX ADMIN — HYDRALAZINE HYDROCHLORIDE 100 MG: 50 TABLET, FILM COATED ORAL at 14:24

## 2019-07-27 RX ADMIN — ROSUVASTATIN CALCIUM 40 MG: 10 TABLET, FILM COATED ORAL at 17:23

## 2019-07-27 RX ADMIN — RANOLAZINE 500 MG: 500 TABLET, FILM COATED, EXTENDED RELEASE ORAL at 21:16

## 2019-07-27 RX ADMIN — ISOSORBIDE DINITRATE 20 MG: 20 TABLET ORAL at 21:16

## 2019-07-27 RX ADMIN — Medication 10 ML: at 08:51

## 2019-07-27 RX ADMIN — HYDRALAZINE HYDROCHLORIDE 100 MG: 50 TABLET, FILM COATED ORAL at 21:16

## 2019-07-27 RX ADMIN — ISOSORBIDE DINITRATE 20 MG: 20 TABLET ORAL at 14:24

## 2019-07-27 RX ADMIN — CLOPIDOGREL BISULFATE 75 MG: 75 TABLET ORAL at 08:51

## 2019-07-27 RX ADMIN — AMLODIPINE BESYLATE 5 MG: 5 TABLET ORAL at 08:51

## 2019-07-27 RX ADMIN — Medication 10 ML: at 21:12

## 2019-07-27 RX ADMIN — CARVEDILOL 25 MG: 25 TABLET, FILM COATED ORAL at 17:23

## 2019-07-27 RX ADMIN — Medication 10 ML: at 04:45

## 2019-07-27 RX ADMIN — AMLODIPINE BESYLATE 5 MG: 5 TABLET ORAL at 10:39

## 2019-07-27 ASSESSMENT — PAIN SCALES - GENERAL: PAINLEVEL_OUTOF10: 0

## 2019-07-27 NOTE — PLAN OF CARE
Patient alert and oriented. Heparin gtt infusing. Adequate urine output. No IVF added at this time. Nephrology states they will add some tomorrow afternoon to pre-hydrate prior to cath procedure.

## 2019-07-27 NOTE — PROGRESS NOTES
Admit: 2019    Name:  Clayton Hernadez  Room:  J85/7804-30  MRN:    [id number]     Daily Progress Note for 2019     Interval History:     Pain free this am.   High risk patient with advanced renal disease, uncontrolled DMII, ECHO EF 35-40%. Scheduled Meds:   [START ON 2019] amLODIPine  10 mg Oral Daily    isosorbide dinitrate  20 mg Oral TID    hydrALAZINE  100 mg Oral TID    [START ON 2019] predniSONE  40 mg Oral Q8H    [START ON 2019] diphenhydrAMINE  25 mg Oral BID    [START ON 2019] famotidine  20 mg Oral BID    insulin glargine  24 Units Subcutaneous Nightly    aspirin  81 mg Oral Daily    carvedilol  25 mg Oral BID WC    clopidogrel  75 mg Oral Daily    ranolazine  500 mg Oral BID    rosuvastatin  40 mg Oral QPM    sodium chloride flush  10 mL Intravenous 2 times per day    insulin lispro  0-6 Units Subcutaneous TID WC    insulin lispro  0-3 Units Subcutaneous Nightly       Continuous Infusions:   dextrose      heparin (porcine) 9.3 mL/hr (19 0852)       PRN Meds:  labetalol, nitroGLYCERIN, sodium chloride flush, ondansetron, glucose, dextrose, glucagon (rDNA), dextrose, acetaminophen, morphine, heparin (porcine), heparin (porcine)                  Objective:     Temp  Av.7 °F (36.5 °C)  Min: 97.4 °F (36.3 °C)  Max: 98 °F (36.7 °C)  Pulse  Av  Min: 77  Max: 89  BP  Min: 119/65  Max: 176/78  SpO2  Av.3 %  Min: 95 %  Max: 99 %  Patient Vitals for the past 4 hrs:   BP Temp Temp src Pulse Resp SpO2   19 1422 119/65 97.4 °F (36.3 °C) Oral 80 17 95 %         Intake/Output Summary (Last 24 hours) at 2019 1533  Last data filed at 2019 1030  Gross per 24 hour   Intake 959 ml   Output 1850 ml   Net -891 ml       Physical Exam:  General:  Awake, alert and oriented.  Appears to be not in any distress  Mucous Membranes:  Pink , anicteric  Neck: No JVD, no carotid bruit, no thyromegaly  Chest:  Clear to auscultation bilaterally, no

## 2019-07-27 NOTE — PROGRESS NOTES
[facility]   Progress Note  Cardiology      HPI: Seeing Mr. Garay today for f/u new-onset afib, NSTEMI, and HTN. He feels fine this AM without specific complaints. No nursing issues. Physical Examination:    Vitals:    19 0630   BP: (!) 175/88   Pulse: 84   Resp:    Temp:    SpO2:           Constitutional and General Appearance: NAD   Respiratory:  · Normal excursion and expansion without use of accessory muscles  · Resp Auscultation: Normal breath sounds without dullness  Cardiovascular:  · The apical impulses not displaced  · Heart tones are crisp and normal  · Cervical veins are not engorged  · The carotid upstroke is normal in amplitude and contour without delay or bruit  · Normal S1S2, No S3, No Murmur  · Peripheral pulses are symmetrical and full  · There is no clubbing, cyanosis of the extremities. · No edema  · Pedal Pulses: 2+ and equal   Abdomen:  · No masses or tenderness  · Liver/Spleen: No Abnormalities Noted  Neurological/Psychiatric:  · Alert and oriented in all spheres  · Moves all extremities well  · No abnormalities of mood, affect, memory, mentation, or behavior are noted  Skin: warm and dry      Lab Results   Component Value Date    WBC 5.9 2019    HGB 17.4 2019    HCT 53.6 (H) 2019    MCV 86.7 2019     (L) 2019     Lab Results   Component Value Date    CREATININE 4.8 (H) 2019    BUN 49 (H) 2019     2019    K 5.1 2019     2019    CO2 25 2019     Lab Results   Component Value Date    INR 1.03 2017    PROTIME 11.6 2017        CXR 19  Stable mild cardiomegaly.  No infiltrates.  No effusions.  No pneumothorax.  No free air below the diaphragm      EK19 I have reviewed EKG with the following interpretation:  Impression:  See HPI Atrial fibrillation with rapid ventricular responseSeptal infarct , age undeterminedST & T wave abnormality, consider inferolateral ischemia anterior descending artery. 2.  Patent saphenous vein graft to obtuse marginal 2.  3.  Non-occluded saphenous vein graft to the obtuse marginal.  4.  Right coronary artery in the posterolateral branch with 90% stenosis present. 5.  Occluded left anterior descending artery in the proximal segment. 6.  Occluded mid left circumflex. ECHO 7/25/19 Summary   LV systolic function is moderately reduced with an EF of 35-40 %.   There is more prominent HK of the anteroseptal wall.   Left ventricular cavity size is mildly dilated.   There is mild hypertrophy of the septum.   Grade I diastolic dysfunction with normal left ventricular filling pressure.   Trivial aortic regurgitation.   Inadequate tricuspid regurgitation jet to estimate systolic artery pressure (SPAP).    Assessment:  Austin Seo is a 48 y.o. patient who presented to [facility]/25/19 with c/o syncope, CP, and SOB. He normally follows with Dr. Anh Ramirez for his cardiology care. LOV 8/31/18. He has PMH CAD s/p 3V CABG (LIMA-LAD; SVG-OM1 + OM2 in 2001), s/p stent placement: LAD + SVG-OM. He had ISR and subsequently underwent CABG. He had SOB and stable angina and has a severely stenotic RPL which has been medically managed.  Additional history  Ischemic CM, HTN, DM (type I) since age 17, CKD, carotid stenosis/PVD, and venous insufficiency. Most recent Walking Lexiscan stress test 4/3/17 showed moderate sized reversible defect distal inferolateral wall involving LCX or  dominant RCA territory; EF of 47% . Most recent [facility] 7/7/17 Patent LIMA-LAD, Patent SVG-OM: Non-occluded SVG-OM; RCA in the posterolateral branch with 90% stenosis present. Occluded prox LAD and mid-Left CCx. Most recent ECHO 5/8/18 EF 40% HK of inferior and lateral walls, grade I DD; MAC; Mild MR. He presented with c/o feeling dizzy and \"out of it\" sitting in car. Worried about low BS and had  at work give him [employer] and Snicker[employer] bar. Also felt sharp mid-CP for 1 hour. TID.  8. IV dye allergy and will order prophylaxis.      Patient Active Problem List   Diagnosis    D[facility]abetic ketoacidoses) [facility]    Coronary artery disease involving native heart with angina pectoris ([facility]    DM type 1 (diabetes mellitus, type 1) ([facility]    Acute kidney injury superimposed on chronic kidney disease ([facility]    PNA (pneumonia)    [facility] insufficiency) [facility]    CHF (congestive heart failure) ([facility])    Hypoglycemia    Diastolic HF (heart failure) ([facility])    Hyperlipidemia with target LDL less than 70    Hypertension    CKD (chronic kidney disease) stage 2, GFR 60-89 ml/min    Type 1 diabetes mellitus with chronic kidney disease ([facility]    Diabetic peripheral neuropathy ([facility])    Carotid artery stenosis    PVD (peripheral vascular disease) ([facility]    Diabetic nephropathy associated with type 1 diabetes mellitus ([facility]    Chest pain    Stenosis of right carotid artery    Carotid artery calcification    H/O carotid angioplasty    Pure hypercholesterolemia    Abnormal cardiovascular stress test    Acute encephalopathy    Generalized idiopathic epilepsy, not intractable, without status epilepticus ([facility]    Encephalopathy    NSTEMI (non-ST elevated myocardial infarction) ([facility]    HTN (hypertension), benign    S/P CABG (coronary artery bypass graft)    Ischemic cardiomyopathy    Venous insufficiency of both lower extremities    Atrial fibrillation with rapid ventricular response ([facility]    Syncope    Stage 4 chronic kidney disease ([facility]

## 2019-07-27 NOTE — PROGRESS NOTES
[facility]    Follow up Note           Reason for Consult:  CKD 4/5  Requesting Physician:  Dr. Cami Swain    Chief Complaint:  Passed out,chest pain    Sub/interval history  Pt is resting on bed and denies any new compliants  Cardiac cath planned for monday    Last 24 h uop 2.1 l    ROS: No chest pain/shortness of breath/fever/nausea/vomiting  PSFH: No visitor    Scheduled Meds:   [START ON 7/28/2019] amLODIPine  10 mg Oral Daily    isosorbide dinitrate  20 mg Oral TID    hydrALAZINE  100 mg Oral TID    [START ON 7/28/2019] predniSONE  40 mg Oral Q8H    [START ON 7/28/2019] diphenhydrAMINE  25 mg Oral BID    [START ON 7/28/2019] famotidine  20 mg Oral BID    insulin glargine  24 Units Subcutaneous Nightly    aspirin  81 mg Oral Daily    carvedilol  25 mg Oral BID WC    clopidogrel  75 mg Oral Daily    ranolazine  500 mg Oral BID    rosuvastatin  40 mg Oral QPM    sodium chloride flush  10 mL Intravenous 2 times per day    insulin lispro  0-6 Units Subcutaneous TID WC    insulin lispro  0-3 Units Subcutaneous Nightly     Continuous Infusions:   dextrose      heparin (porcine) 9.3 mL/hr (07/27/19 0852)     PRN Meds:.labetalol, nitroGLYCERIN, sodium chloride flush, ondansetron, glucose, dextrose, glucagon (rDNA), dextrose, acetaminophen, morphine, heparin (porcine), heparin (porcine)      History of Present Illness on 7/25/19:    48 y.o. yo male with PMH of DM since age 17, CKD 4-5, CAD s/p CABG who is admitted for passing out spell after a hypoglycemic event which occurred after not eating well for over 20 hrs or so; he has an insulin pump  He has advanced CKD sees dr Felipe Yeh , last visit was over a year ago and has not made appt since then; his cr at baseline is in the low to mid 4s  He was noted to have afib rvr, elevated troponin and is cardiology is contemplating on cardiac cath    Physical exam:   Constitutional:  VITALS:  /65   Pulse 80   Temp 97.4 °F (36.3 °C) (Oral)   Resp 17   Ht 5' 8\" (1.727 m)   Wt 195 lb 8 oz (88.7 kg)   SpO2 95%   BMI 29.73 kg/m²   Gen: alert, awake, nad  Skin: no rash, turgor wnl  Heent:  eomi, mmm  Neck: no bruits or jvd noted, thyroid normal  Cardiovascular:  S1, S2 without m/r/g  Respiratory: CTA B without w/r/r; respiratory effort normal  Abdomen:  +bs, soft, nt, nd, no hepatosplenomegaly  Ext: no lower extremity edema  Neuro/Psy: AAoriented times 3 ; moves all 4 ext  Musculoskeletal:  Rom, muscular strength intact; digits, nails normal    Data/  Recent Labs     07/24/19  2249 07/25/19  0444 07/27/19  0434   WBC 5.5 5.9  --    HGB 17.9* 17.4  --    HCT 56.5* 53.6*  --    MCV 87.7 86.7  --    * 138 108*     Recent Labs     07/25/19  0444 07/26/19  0510 07/27/19  0434    136 139   K 4.4 3.8 5.1    100 102   CO2 27 24 25   GLUCOSE 267* 150* 98   PHOS  --  3.6 4.1   MG 2.20  --   --    BUN 42* 47* 49*   CREATININE 4.5* 4.7* 4.8*   LABGLOM 14* 13* 13*   GFRAA 17* 16* 16*     Summary of echo    LV systolic function is moderately reduced with an EF of 35-40 %.   There is more prominent HK of the anteroseptal wall.   Left ventricular cavity size is mildly dilated.   There is mild hypertrophy of the septum.   Grade I diastolic dysfunction with normal left ventricular filling pressure.   Trivial aortic regurgitation.   Inadequate tricuspid regurgitation jet to estimate systolic artery pressure   (SPAP). Assessment  -CKD IV-V, he seems to be at baseline at present   -Afib  -NSTEMI  -Hyperkalemia in presence of hyperglycemia, improved  -CHFrEF, chronic , compensated  -syncope in the setting of hypoglycemia   -HTN    Plan  -d/ w pt and family about high risk of contrast induced jerry with likelihood for needing dialysis post cath and they understand the risk.  They also understand that his heart health is of utmost importance at this time   -IVF tomorrow  -encourage po fluids 50-60 oz per day  -hydralazine has been adjusted

## 2019-07-28 LAB
ALBUMIN SERPL-MCNC: 3.9 G/DL (ref 3.4–5)
ANION GAP SERPL CALCULATED.3IONS-SCNC: 12 MMOL/L (ref 3–16)
APTT: 51.4 SEC (ref 26–36)
APTT: 53.9 SEC (ref 26–36)
APTT: 95.7 SEC (ref 26–36)
BUN BLDV-MCNC: 51 MG/DL (ref 7–20)
CALCIUM SERPL-MCNC: 9.1 MG/DL (ref 8.3–10.6)
CHLORIDE BLD-SCNC: 96 MMOL/L (ref 99–110)
CO2: 23 MMOL/L (ref 21–32)
CREAT SERPL-MCNC: 4.9 MG/DL (ref 0.9–1.3)
GFR AFRICAN AMERICAN: 15
GFR NON-AFRICAN AMERICAN: 13
GLUCOSE BLD-MCNC: 141 MG/DL (ref 70–99)
GLUCOSE BLD-MCNC: 260 MG/DL (ref 70–99)
GLUCOSE BLD-MCNC: 271 MG/DL (ref 70–99)
GLUCOSE BLD-MCNC: 283 MG/DL (ref 70–99)
GLUCOSE BLD-MCNC: 339 MG/DL (ref 70–99)
GLUCOSE BLD-MCNC: 394 MG/DL (ref 70–99)
GLUCOSE BLD-MCNC: 406 MG/DL (ref 70–99)
GLUCOSE BLD-MCNC: 416 MG/DL (ref 70–99)
GLUCOSE BLD-MCNC: 423 MG/DL (ref 70–99)
GLUCOSE BLD-MCNC: 49 MG/DL (ref 70–99)
PERFORMED ON: ABNORMAL
PHOSPHORUS: 2.8 MG/DL (ref 2.5–4.9)
POTASSIUM SERPL-SCNC: 5.3 MMOL/L (ref 3.5–5.1)
SODIUM BLD-SCNC: 131 MMOL/L (ref 136–145)

## 2019-07-28 PROCEDURE — 6360000002 HC RX W HCPCS: Performed by: INTERNAL MEDICINE

## 2019-07-28 PROCEDURE — 6370000000 HC RX 637 (ALT 250 FOR IP): Performed by: INTERNAL MEDICINE

## 2019-07-28 PROCEDURE — 2580000003 HC RX 258: Performed by: INTERNAL MEDICINE

## 2019-07-28 PROCEDURE — 2060000000 HC ICU INTERMEDIATE R&B

## 2019-07-28 PROCEDURE — 85730 THROMBOPLASTIN TIME PARTIAL: CPT

## 2019-07-28 PROCEDURE — 99232 SBSQ HOSP IP/OBS MODERATE 35: CPT | Performed by: INTERNAL MEDICINE

## 2019-07-28 PROCEDURE — 80069 RENAL FUNCTION PANEL: CPT

## 2019-07-28 PROCEDURE — 36415 COLL VENOUS BLD VENIPUNCTURE: CPT

## 2019-07-28 RX ORDER — INSULIN GLARGINE 100 [IU]/ML
12 INJECTION, SOLUTION SUBCUTANEOUS NIGHTLY
Status: DISCONTINUED | OUTPATIENT
Start: 2019-07-28 | End: 2019-07-28

## 2019-07-28 RX ORDER — SODIUM CHLORIDE 9 MG/ML
INJECTION, SOLUTION INTRAVENOUS CONTINUOUS
Status: DISCONTINUED | OUTPATIENT
Start: 2019-07-28 | End: 2019-07-29 | Stop reason: HOSPADM

## 2019-07-28 RX ORDER — HEPARIN SODIUM 1000 [USP'U]/ML
2000 INJECTION, SOLUTION INTRAVENOUS; SUBCUTANEOUS ONCE
Status: COMPLETED | OUTPATIENT
Start: 2019-07-28 | End: 2019-07-28

## 2019-07-28 RX ORDER — HEPARIN SODIUM 10000 [USP'U]/100ML
9.3 INJECTION, SOLUTION INTRAVENOUS CONTINUOUS
Status: DISCONTINUED | OUTPATIENT
Start: 2019-07-28 | End: 2019-07-29 | Stop reason: HOSPADM

## 2019-07-28 RX ORDER — SODIUM POLYSTYRENE SULFONATE 15 G/60ML
30 SUSPENSION ORAL; RECTAL ONCE
Status: COMPLETED | OUTPATIENT
Start: 2019-07-28 | End: 2019-07-28

## 2019-07-28 RX ADMIN — HYDRALAZINE HYDROCHLORIDE 100 MG: 50 TABLET, FILM COATED ORAL at 20:24

## 2019-07-28 RX ADMIN — FAMOTIDINE 20 MG: 20 TABLET, FILM COATED ORAL at 20:23

## 2019-07-28 RX ADMIN — HEPARIN SODIUM 2000 UNITS: 1000 INJECTION INTRAVENOUS; SUBCUTANEOUS at 09:00

## 2019-07-28 RX ADMIN — ASPIRIN 81 MG 81 MG: 81 TABLET ORAL at 09:26

## 2019-07-28 RX ADMIN — HEPARIN SODIUM 10.8 ML/HR: 10000 INJECTION, SOLUTION INTRAVENOUS at 09:01

## 2019-07-28 RX ADMIN — CARVEDILOL 25 MG: 25 TABLET, FILM COATED ORAL at 17:17

## 2019-07-28 RX ADMIN — SODIUM CHLORIDE: 9 INJECTION, SOLUTION INTRAVENOUS at 16:08

## 2019-07-28 RX ADMIN — CLOPIDOGREL BISULFATE 75 MG: 75 TABLET ORAL at 09:26

## 2019-07-28 RX ADMIN — FAMOTIDINE 20 MG: 20 TABLET, FILM COATED ORAL at 09:26

## 2019-07-28 RX ADMIN — DIPHENHYDRAMINE HCL 25 MG: 25 TABLET ORAL at 09:26

## 2019-07-28 RX ADMIN — PREDNISONE 40 MG: 10 TABLET ORAL at 09:26

## 2019-07-28 RX ADMIN — AMLODIPINE BESYLATE 10 MG: 5 TABLET ORAL at 09:26

## 2019-07-28 RX ADMIN — ROSUVASTATIN CALCIUM 40 MG: 10 TABLET, FILM COATED ORAL at 17:55

## 2019-07-28 RX ADMIN — SODIUM POLYSTYRENE SULFONATE 30 G: 15 SUSPENSION ORAL; RECTAL at 15:24

## 2019-07-28 RX ADMIN — ISOSORBIDE DINITRATE 20 MG: 20 TABLET ORAL at 09:26

## 2019-07-28 RX ADMIN — HYDRALAZINE HYDROCHLORIDE 100 MG: 50 TABLET, FILM COATED ORAL at 14:52

## 2019-07-28 RX ADMIN — RANOLAZINE 500 MG: 500 TABLET, FILM COATED, EXTENDED RELEASE ORAL at 09:26

## 2019-07-28 RX ADMIN — HYDRALAZINE HYDROCHLORIDE 100 MG: 50 TABLET, FILM COATED ORAL at 09:25

## 2019-07-28 RX ADMIN — PREDNISONE 40 MG: 10 TABLET ORAL at 23:23

## 2019-07-28 RX ADMIN — PREDNISONE 40 MG: 10 TABLET ORAL at 16:08

## 2019-07-28 RX ADMIN — Medication 10 ML: at 09:01

## 2019-07-28 RX ADMIN — ISOSORBIDE DINITRATE 20 MG: 20 TABLET ORAL at 14:52

## 2019-07-28 RX ADMIN — CARVEDILOL 25 MG: 25 TABLET, FILM COATED ORAL at 09:26

## 2019-07-28 RX ADMIN — DIPHENHYDRAMINE HCL 25 MG: 25 TABLET ORAL at 20:23

## 2019-07-28 RX ADMIN — RANOLAZINE 500 MG: 500 TABLET, FILM COATED, EXTENDED RELEASE ORAL at 20:23

## 2019-07-28 RX ADMIN — ISOSORBIDE DINITRATE 20 MG: 20 TABLET ORAL at 20:24

## 2019-07-28 RX ADMIN — Medication 10 ML: at 20:24

## 2019-07-28 NOTE — PROGRESS NOTES
[facility]    Follow up Note           Reason for Consult:  CKD 4/5  Requesting Physician:  Dr. Azra Raymond    Chief Complaint:  Passed out,chest pain    Sub/interval history  No new complaints  Cardiac cath planned for monday    Last 24 h uop 1.5 l    ROS: No chest pain/shortness of breath/fever/nausea/vomiting  PSFH: No visitor    Scheduled Meds:   insulin glargine  12 Units Subcutaneous Nightly    amLODIPine  10 mg Oral Daily    isosorbide dinitrate  20 mg Oral TID    hydrALAZINE  100 mg Oral TID    predniSONE  40 mg Oral Q8H    diphenhydrAMINE  25 mg Oral BID    famotidine  20 mg Oral BID    aspirin  81 mg Oral Daily    carvedilol  25 mg Oral BID WC    clopidogrel  75 mg Oral Daily    ranolazine  500 mg Oral BID    rosuvastatin  40 mg Oral QPM    sodium chloride flush  10 mL Intravenous 2 times per day    insulin lispro  0-6 Units Subcutaneous TID WC    insulin lispro  0-3 Units Subcutaneous Nightly     Continuous Infusions:   heparin (porcine) 10.8 mL/hr (07/28/19 0901)    dextrose       PRN Meds:.labetalol, nitroGLYCERIN, sodium chloride flush, ondansetron, glucose, dextrose, glucagon (rDNA), dextrose, acetaminophen, morphine, heparin (porcine), heparin (porcine)      History of Present Illness on 7/25/19:    48 y.o. yo male with PMH of DM since age 17, CKD 4-5, CAD s/p CABG who is admitted for passing out spell after a hypoglycemic event which occurred after not eating well for over 20 hrs or so; he has an insulin pump  He has advanced CKD sees dr Efren Alaniz , last visit was over a year ago and has not made appt since then; his cr at baseline is in the low to mid 4s  He was noted to have afib rvr, elevated troponin and is cardiology is contemplating on cardiac cath    Physical exam:   Constitutional:  VITALS:  /63   Pulse 63   Temp 97.7 °F (36.5 °C) (Oral)   Resp 16   Ht 5' 8\" (1.727 m)   Wt 198 lb 14.4 oz (90.2 kg)   SpO2 96%   BMI 30.24 kg/m²   Gen: alert, awake, nad  Skin: no rash, turgor wnl  Heent:  eomi, mmm  Neck: no bruits or jvd noted, thyroid normal  Cardiovascular:  S1, S2 without m/r/g  Respiratory: CTA B without w/r/r; respiratory effort normal  Abdomen:  +bs, soft, nt, nd, no hepatosplenomegaly  Ext: no lower extremity edema  Neuro/Psy: AAoriented times 3 ; moves all 4 ext  Musculoskeletal:  Rom, muscular strength intact; digits, nails normal    Data/  Recent Labs     07/27/19  0434   *     Recent Labs     07/26/19  0510 07/27/19  0434 07/28/19  0442    139 131*   K 3.8 5.1 5.3*    102 96*   CO2 24 25 23   GLUCOSE 150* 98 283*   PHOS 3.6 4.1 2.8   BUN 47* 49* 51*   CREATININE 4.7* 4.8* 4.9*   LABGLOM 13* 13* 13*   GFRAA 16* 16* 15*     Summary of echo    LV systolic function is moderately reduced with an EF of 35-40 %.   There is more prominent HK of the anteroseptal wall.   Left ventricular cavity size is mildly dilated.   There is mild hypertrophy of the septum.   Grade I diastolic dysfunction with normal left ventricular filling pressure.   Trivial aortic regurgitation.   Inadequate tricuspid regurgitation jet to estimate systolic artery pressure   (SPAP). Assessment  -CKD IV-V, he seems to be at baseline at present   -Afib  -NSTEMI  -Hyperkalemia in presence of hyperglycemia, improved  -CHFrEF, chronic , compensated  -syncope in the setting of hypoglycemia   -HTN  -Hyperkalemia- some of it is from hemolysis as well    Plan  -d/ w pt and family about high risk of contrast induced jerry with likelihood for needing dialysis post cath and they understand the risk.  They also understand that his heart health is of utmost importance at this time   -IVF NS at 100 ml/h  -kayexalte 30 gms   -encourage po fluids 50-60 oz per day  -hydralazine has been adjusted and coreg  -amlodipine added   -Serial renal panel  -daily wts and strict i/o  -renal dose medications   -avoid nephrotoxins    Will follow along    Thank you for the

## 2019-07-28 NOTE — PROGRESS NOTES
gallops  Abdomen:  Soft, undistended, non tender, no organomegaly, BS present  Extremities: No edema or cyanosis. Distal pulses well felt  Neurological : no focal deficits    Lab Data:  CBC:   Recent Labs     07/27/19 0434   *     BMP:   Recent Labs     07/26/19  0510 07/27/19  0434 07/28/19 0442    139 131*   K 3.8 5.1 5.3*    102 96*   CO2 24 25 23   PHOS 3.6 4.1 2.8   BUN 47* 49* 51*   CREATININE 4.7* 4.8* 4.9*     BNP: No results for input(s): BNP in the last 72 hours. PT/INR: No results for input(s): PROTIME, INR in the last 72 hours. APTT:  Recent Labs     07/26/19  0510 07/27/19  0434 07/28/19  0442   APTT 62.4* 73.7* 51.4*     CARDIAC ENZYMES:   No results for input(s): CKMB, CKMBINDEX, TROPONINI in the last 72 hours. Invalid input(s): CKTOTAL;3  FASTING LIPID PANEL:  Lab Results   Component Value Date    CHOL 185 07/25/2019    HDL 45 07/25/2019    TRIG 75 07/25/2019     LIVER PROFILE:   No results for input(s): AST, ALT, ALB, BILIDIR, BILITOT, ALKPHOS in the last 72 hours.     EKG:  I have reviewed the EKG with the following interpretation:   Atrial fibrillation with RVR,Left atrial enlargementSeptal infarct , age undeterminedT wave abnormality inferolateral leads consider ischemiaAbnormal       RADIOLOGY  XR CHEST PORTABLE   Final Result   No acute findings in the chest.  Stable cardiomegaly.             Assessment & Plan:     Patient Active Problem List    Diagnosis Date Noted    NSTEMI (non-ST elevated myocardial infarction) ([facility] 05/08/2018     Priority: High    Encephalopathy      Priority: High    DKA (diabetic ketoacidoses) ([facility] 05/25/2010     Priority: High    Acute kidney injury superimposed on chronic kidney disease ([facility] 05/26/2010     Priority: Medium    Atrial fibrillation with rapid ventricular response ([facility] 07/25/2019    Syncope     Stage 4 chronic kidney disease ([facility]     S/P CABG (coronary artery bypass graft) 05/24/2018    Ischemic cardiomyopathy

## 2019-07-28 NOTE — PROGRESS NOTES
blood pressure control. Will increase norvasc 10mg qd and add isordil 20mg TID.   8. IV dye allergy and prophylaxis meds ordered.     Patient Active Problem List   Diagnosis    DKA (diabetic ketoacidoses) ([facility]    Coronary artery disease involving native heart with angina pectoris ([facility]    DM type 1 (diabetes mellitus, type 1) ([facility]    Acute kidney injury superimposed on chronic kidney disease ([facility]    PNA (pneumonia)    [facility] (chronic renal insufficiency) [facility]    CHF (congestive heart failure) ([facility]    Hypoglycemia    Diastolic HF (heart failure) ([facility]    Hyperlipidemia with target LDL less than 70    Hypertension    CKD (chronic kidney disease) stage 2, GFR 60-89 ml/min    Type 1 diabetes mellitus with chronic kidney disease ([facility]    Diabetic peripheral neuropathy ([facility]    Carotid artery stenosis    PVD (peripheral vascular disease) ([facility]    Diabetic nephropathy associated with type 1 diabetes mellitus ([facility]    Chest pain    Stenosis of right carotid artery    Carotid artery calcification    H/O carotid angioplasty    Pure hypercholesterolemia    Abnormal cardiovascular stress test    Acute encephalopathy    Generalized idiopathic epilepsy, not intractable, without status epilepticus ([facility]    Encephalopathy    NSTEMI (non-ST elevated myocardial infarction) ([facility]    HTN (hypertension), benign    S/P CABG (coronary artery bypass graft)    Ischemic cardiomyopathy    Venous insufficiency of both lower extremities    Atrial fibrillation with rapid ventricular response ([facility]    Syncope    Stage 4 chronic kidney disease ([facility]

## 2019-07-28 NOTE — PROGRESS NOTES
Vitals:    07/27/19 2115   BP: 132/73   Pulse: 58   Resp: 16   Temp: 97 °F (36.1 °C)   SpO2: 98%     Patient resting in bed, A&Ox4. SB on telemetry. Currently on RA. Pt refusing our SSI and lantus due to insulin pump. Blood glucose is 268. Agreed we will recheck at 2200. No further needs indicated at this time. Bed in low position with wheels locked. Call light in reach. Will continue to monitor.

## 2019-07-28 NOTE — CARE COORDINATION
Patient with transfer to [facility] for [facility] tomorrow, 7/29/19.

## 2019-07-28 NOTE — DISCHARGE SUMMARY
4.5  - Creatinine on admission 4.4  - Continue to monitor   - Nephrology consult for recommendations for possible cath in am          DM type 1   - Dx at the age of 17   - Reports that his blood sugars have been a \"roller coaster\"  - Patient follows with Dr. Jessy Sorensen    - He has an insulin pump and has refused to take hospital insulin - has been using his pump instead. - He reports his most recent hgb A1c ~ 7.1  - A1C 11.5 presently. - I did give pt adjusted sliding scale as per medium dose scale to help counteract steroid hyperglycemia. Discussed need to decrease basal bolus once he goes NPO after midnight.         Carotid artery stenosis  - s/p ICA stenting by Dr. Merrianne Rubinstein in 2016          Chronic combined CHF  - EF of 40% with grade I DD   - on BB and Lasix at home, Lasix held for now   - ASA, coreg and statin. No aldactone, ACEI/ARB with hyperkalemia and advanced renal disease.         HLD  - Continue Statin            Physical Exam Performed:     BP (!) 150/77   Pulse 63   Temp 97.7 °F (36.5 °C) (Oral)   Resp 16   Ht 5' 8\" (1.727 m)   Wt 198 lb 14.4 oz (90.2 kg)   SpO2 96%   BMI 30.24 kg/m²       General appearance:  No apparent distress, appears stated age and cooperative. HEENT:  Normal cephalic, atraumatic without obvious deformity. Pupils equal, round, and reactive to light. Extra ocular muscles intact. Conjunctivae/corneas clear. Neck: Supple, with full range of motion. No jugular venous distention. Trachea midline. Respiratory:  Normal respiratory effort. Clear to auscultation, bilaterally without Rales/Wheezes/Rhonchi. Cardiovascular:  Regular rate and rhythm with normal S1/S2 without murmurs, rubs or gallops. Abdomen: Soft, non-tender, non-distended with normal bowel sounds. Musculoskeletal:  No clubbing, cyanosis or edema bilaterally. Full range of motion without deformity. Skin: Skin color, texture, turgor normal.  No rashes or lesions.   Neurologic:  Neurovascularly intact review of medications and discharge plan. Signed:    Jeri Townsend MD   7/28/2019      Thank you No primary care provider on file. for the opportunity to be involved in this patient's care. If you have any questions or concerns please feel free to contact me at [phone].

## 2019-07-28 NOTE — PROGRESS NOTES
Patient awake and alert. Still prefers to continue to adjust home insulin pump and refusing sliding scale insulin. Patient states that he has \"adjusted for his high sugar. \"

## 2019-07-29 ENCOUNTER — HOSPITAL ENCOUNTER (INPATIENT)
Dept: CARDIAC CATH/INVASIVE PROCEDURES | Age: 54
LOS: 2 days | Discharge: HOME | DRG: 287 | End: 2019-07-31
Attending: INTERNAL MEDICINE | Admitting: INTERNAL MEDICINE

## 2019-07-29 VITALS
BODY MASS INDEX: 30.42 KG/M2 | HEIGHT: 68 IN | WEIGHT: 200.7 LBS | RESPIRATION RATE: 16 BRPM | HEART RATE: 99 BPM | TEMPERATURE: 98.9 F | OXYGEN SATURATION: 97 % | DIASTOLIC BLOOD PRESSURE: 79 MMHG | SYSTOLIC BLOOD PRESSURE: 158 MMHG

## 2019-07-29 DIAGNOSIS — I48.91 ATRIAL FIBRILLATION WITH RAPID VENTRICULAR RESPONSE (HCC): Primary | ICD-10-CM

## 2019-07-29 PROBLEM — I47.29 NSVT (NONSUSTAINED VENTRICULAR TACHYCARDIA) (HCC): Status: ACTIVE | Noted: 2019-07-29

## 2019-07-29 LAB
ALBUMIN SERPL-MCNC: 4 G/DL (ref 3.4–5)
ANION GAP SERPL CALCULATED.3IONS-SCNC: 13 MMOL/L (ref 3–16)
APTT: 54.2 SEC (ref 26–36)
BUN BLDV-MCNC: 55 MG/DL (ref 7–20)
CALCIUM SERPL-MCNC: 9 MG/DL (ref 8.3–10.6)
CHLORIDE BLD-SCNC: 98 MMOL/L (ref 99–110)
CO2: 24 MMOL/L (ref 21–32)
CREAT SERPL-MCNC: 5.3 MG/DL (ref 0.9–1.3)
GFR AFRICAN AMERICAN: 14
GFR NON-AFRICAN AMERICAN: 11
GLUCOSE BLD-MCNC: 136 MG/DL (ref 70–99)
GLUCOSE BLD-MCNC: 156 MG/DL (ref 70–99)
GLUCOSE BLD-MCNC: 161 MG/DL (ref 70–99)
GLUCOSE BLD-MCNC: 184 MG/DL (ref 70–99)
PERFORMED ON: ABNORMAL
PHOSPHORUS: 3.4 MG/DL (ref 2.5–4.9)
PLATELET # BLD: 109 K/UL (ref 135–450)
POTASSIUM SERPL-SCNC: 4.3 MMOL/L (ref 3.5–5.1)
SODIUM BLD-SCNC: 135 MMOL/L (ref 136–145)

## 2019-07-29 PROCEDURE — C1894 INTRO/SHEATH, NON-LASER: HCPCS

## 2019-07-29 PROCEDURE — 85049 AUTOMATED PLATELET COUNT: CPT

## 2019-07-29 PROCEDURE — 85730 THROMBOPLASTIN TIME PARTIAL: CPT

## 2019-07-29 PROCEDURE — 93459 L HRT ART/GRFT ANGIO: CPT

## 2019-07-29 PROCEDURE — 6360000002 HC RX W HCPCS

## 2019-07-29 PROCEDURE — 4A023N7 MEASUREMENT OF CARDIAC SAMPLING AND PRESSURE, LEFT HEART, PERCUTANEOUS APPROACH: ICD-10-PCS | Performed by: INTERNAL MEDICINE

## 2019-07-29 PROCEDURE — 6370000000 HC RX 637 (ALT 250 FOR IP): Performed by: INTERNAL MEDICINE

## 2019-07-29 PROCEDURE — B2111ZZ FLUOROSCOPY OF MULTIPLE CORONARY ARTERIES USING LOW OSMOLAR CONTRAST: ICD-10-PCS | Performed by: INTERNAL MEDICINE

## 2019-07-29 PROCEDURE — 1200000000 HC SEMI PRIVATE

## 2019-07-29 PROCEDURE — 6360000004 HC RX CONTRAST MEDICATION

## 2019-07-29 PROCEDURE — 80069 RENAL FUNCTION PANEL: CPT

## 2019-07-29 PROCEDURE — 6370000000 HC RX 637 (ALT 250 FOR IP)

## 2019-07-29 PROCEDURE — 99152 MOD SED SAME PHYS/QHP 5/>YRS: CPT | Performed by: INTERNAL MEDICINE

## 2019-07-29 PROCEDURE — 99223 1ST HOSP IP/OBS HIGH 75: CPT | Performed by: INTERNAL MEDICINE

## 2019-07-29 PROCEDURE — C1769 GUIDE WIRE: HCPCS

## 2019-07-29 PROCEDURE — 2580000003 HC RX 258: Performed by: INTERNAL MEDICINE

## 2019-07-29 PROCEDURE — 99152 MOD SED SAME PHYS/QHP 5/>YRS: CPT

## 2019-07-29 PROCEDURE — 93005 ELECTROCARDIOGRAM TRACING: CPT | Performed by: INTERNAL MEDICINE

## 2019-07-29 PROCEDURE — 2580000003 HC RX 258

## 2019-07-29 PROCEDURE — 93459 L HRT ART/GRFT ANGIO: CPT | Performed by: INTERNAL MEDICINE

## 2019-07-29 PROCEDURE — 36415 COLL VENOUS BLD VENIPUNCTURE: CPT

## 2019-07-29 PROCEDURE — 2500000003 HC RX 250 WO HCPCS

## 2019-07-29 PROCEDURE — B2131ZZ FLUOROSCOPY OF MULTIPLE CORONARY ARTERY BYPASS GRAFTS USING LOW OSMOLAR CONTRAST: ICD-10-PCS | Performed by: INTERNAL MEDICINE

## 2019-07-29 PROCEDURE — C1760 CLOSURE DEV, VASC: HCPCS

## 2019-07-29 PROCEDURE — 2709999900 HC NON-CHARGEABLE SUPPLY

## 2019-07-29 RX ORDER — ONDANSETRON 2 MG/ML
4 INJECTION INTRAMUSCULAR; INTRAVENOUS EVERY 6 HOURS PRN
Status: DISCONTINUED | OUTPATIENT
Start: 2019-07-29 | End: 2019-07-31 | Stop reason: HOSPADM

## 2019-07-29 RX ORDER — SODIUM CHLORIDE 9 MG/ML
INJECTION, SOLUTION INTRAVENOUS CONTINUOUS
Status: DISCONTINUED | OUTPATIENT
Start: 2019-07-29 | End: 2019-07-29

## 2019-07-29 RX ORDER — HEPARIN SODIUM 1000 [USP'U]/ML
2000 INJECTION, SOLUTION INTRAVENOUS; SUBCUTANEOUS PRN
Status: DISCONTINUED | OUTPATIENT
Start: 2019-07-29 | End: 2019-07-29

## 2019-07-29 RX ORDER — CARVEDILOL 6.25 MG/1
25 TABLET ORAL 2 TIMES DAILY WITH MEALS
Status: DISCONTINUED | OUTPATIENT
Start: 2019-07-29 | End: 2019-07-29 | Stop reason: SDUPTHER

## 2019-07-29 RX ORDER — HEPARIN SODIUM 1000 [USP'U]/ML
4000 INJECTION, SOLUTION INTRAVENOUS; SUBCUTANEOUS PRN
Status: DISCONTINUED | OUTPATIENT
Start: 2019-07-29 | End: 2019-07-29

## 2019-07-29 RX ORDER — FUROSEMIDE 40 MG/1
40 TABLET ORAL DAILY
Status: DISCONTINUED | OUTPATIENT
Start: 2019-07-29 | End: 2019-07-31 | Stop reason: HOSPADM

## 2019-07-29 RX ORDER — HYDRALAZINE HYDROCHLORIDE 25 MG/1
100 TABLET, FILM COATED ORAL 3 TIMES DAILY
Status: DISCONTINUED | OUTPATIENT
Start: 2019-07-29 | End: 2019-07-31 | Stop reason: HOSPADM

## 2019-07-29 RX ORDER — LABETALOL HYDROCHLORIDE 5 MG/ML
10 INJECTION, SOLUTION INTRAVENOUS EVERY 4 HOURS PRN
Status: DISCONTINUED | OUTPATIENT
Start: 2019-07-29 | End: 2019-07-31 | Stop reason: HOSPADM

## 2019-07-29 RX ORDER — NITROGLYCERIN 0.4 MG/1
0.4 TABLET SUBLINGUAL EVERY 5 MIN PRN
Status: DISCONTINUED | OUTPATIENT
Start: 2019-07-29 | End: 2019-07-31 | Stop reason: HOSPADM

## 2019-07-29 RX ORDER — SODIUM CHLORIDE 0.9 % (FLUSH) 0.9 %
10 SYRINGE (ML) INJECTION PRN
Status: DISCONTINUED | OUTPATIENT
Start: 2019-07-29 | End: 2019-07-31 | Stop reason: HOSPADM

## 2019-07-29 RX ORDER — CLOPIDOGREL BISULFATE 75 MG/1
75 TABLET ORAL DAILY
Status: DISCONTINUED | OUTPATIENT
Start: 2019-07-29 | End: 2019-07-30

## 2019-07-29 RX ORDER — HEPARIN SODIUM 1000 [USP'U]/ML
2000 INJECTION, SOLUTION INTRAVENOUS; SUBCUTANEOUS PRN
Status: DISCONTINUED | OUTPATIENT
Start: 2019-07-29 | End: 2019-07-31 | Stop reason: HOSPADM

## 2019-07-29 RX ORDER — FENTANYL CITRATE 50 UG/ML
50 INJECTION, SOLUTION INTRAMUSCULAR; INTRAVENOUS ONCE
Status: COMPLETED | OUTPATIENT
Start: 2019-07-29 | End: 2019-07-29

## 2019-07-29 RX ORDER — CARVEDILOL 25 MG/1
25 TABLET ORAL 2 TIMES DAILY WITH MEALS
Status: DISCONTINUED | OUTPATIENT
Start: 2019-07-29 | End: 2019-07-31 | Stop reason: HOSPADM

## 2019-07-29 RX ORDER — ISOSORBIDE MONONITRATE 30 MG/1
30 TABLET, EXTENDED RELEASE ORAL DAILY
Status: DISCONTINUED | OUTPATIENT
Start: 2019-07-29 | End: 2019-07-31 | Stop reason: HOSPADM

## 2019-07-29 RX ORDER — SODIUM CHLORIDE 0.9 % (FLUSH) 0.9 %
10 SYRINGE (ML) INJECTION EVERY 12 HOURS SCHEDULED
Status: DISCONTINUED | OUTPATIENT
Start: 2019-07-29 | End: 2019-07-30 | Stop reason: SDUPTHER

## 2019-07-29 RX ORDER — LABETALOL HYDROCHLORIDE 5 MG/ML
10 INJECTION, SOLUTION INTRAVENOUS EVERY 6 HOURS PRN
Status: DISCONTINUED | OUTPATIENT
Start: 2019-07-29 | End: 2019-07-31 | Stop reason: HOSPADM

## 2019-07-29 RX ORDER — MIDAZOLAM HYDROCHLORIDE 1 MG/ML
2 INJECTION INTRAMUSCULAR; INTRAVENOUS ONCE
Status: COMPLETED | OUTPATIENT
Start: 2019-07-29 | End: 2019-07-29

## 2019-07-29 RX ORDER — SODIUM CHLORIDE 0.9 % (FLUSH) 0.9 %
10 SYRINGE (ML) INJECTION PRN
Status: DISCONTINUED | OUTPATIENT
Start: 2019-07-29 | End: 2019-07-30 | Stop reason: SDUPTHER

## 2019-07-29 RX ORDER — ROSUVASTATIN CALCIUM 10 MG/1
40 TABLET, COATED ORAL EVERY EVENING
Status: DISCONTINUED | OUTPATIENT
Start: 2019-07-29 | End: 2019-07-31 | Stop reason: HOSPADM

## 2019-07-29 RX ORDER — LABETALOL HYDROCHLORIDE 5 MG/ML
10 INJECTION, SOLUTION INTRAVENOUS ONCE
Status: COMPLETED | OUTPATIENT
Start: 2019-07-29 | End: 2019-07-29

## 2019-07-29 RX ORDER — DIPHENHYDRAMINE HCL 25 MG
25 TABLET ORAL EVERY 6 HOURS PRN
Status: DISCONTINUED | OUTPATIENT
Start: 2019-07-29 | End: 2019-07-31 | Stop reason: HOSPADM

## 2019-07-29 RX ORDER — NICOTINE POLACRILEX 4 MG
15 LOZENGE BUCCAL PRN
Status: DISCONTINUED | OUTPATIENT
Start: 2019-07-29 | End: 2019-07-31 | Stop reason: HOSPADM

## 2019-07-29 RX ORDER — LABETALOL HYDROCHLORIDE 5 MG/ML
5 INJECTION, SOLUTION INTRAVENOUS EVERY 4 HOURS PRN
Status: DISCONTINUED | OUTPATIENT
Start: 2019-07-29 | End: 2019-07-31 | Stop reason: HOSPADM

## 2019-07-29 RX ORDER — ASPIRIN 81 MG/1
81 TABLET, CHEWABLE ORAL DAILY
Status: DISCONTINUED | OUTPATIENT
Start: 2019-07-29 | End: 2019-07-31 | Stop reason: HOSPADM

## 2019-07-29 RX ORDER — PREDNISONE 20 MG/1
40 TABLET ORAL EVERY 8 HOURS
Status: DISCONTINUED | OUTPATIENT
Start: 2019-07-29 | End: 2019-07-29

## 2019-07-29 RX ORDER — ACETAMINOPHEN 325 MG/1
650 TABLET ORAL EVERY 4 HOURS PRN
Status: DISCONTINUED | OUTPATIENT
Start: 2019-07-29 | End: 2019-07-31 | Stop reason: HOSPADM

## 2019-07-29 RX ORDER — DIPHENHYDRAMINE HCL 25 MG
25 TABLET ORAL 2 TIMES DAILY
Status: DISCONTINUED | OUTPATIENT
Start: 2019-07-29 | End: 2019-07-29

## 2019-07-29 RX ORDER — SODIUM CHLORIDE 0.9 % (FLUSH) 0.9 %
10 SYRINGE (ML) INJECTION EVERY 12 HOURS SCHEDULED
Status: DISCONTINUED | OUTPATIENT
Start: 2019-07-29 | End: 2019-07-31 | Stop reason: HOSPADM

## 2019-07-29 RX ORDER — MORPHINE SULFATE 4 MG/ML
2 INJECTION, SOLUTION INTRAMUSCULAR; INTRAVENOUS
Status: DISCONTINUED | OUTPATIENT
Start: 2019-07-29 | End: 2019-07-31 | Stop reason: HOSPADM

## 2019-07-29 RX ORDER — DEXTROSE MONOHYDRATE 50 MG/ML
100 INJECTION, SOLUTION INTRAVENOUS PRN
Status: DISCONTINUED | OUTPATIENT
Start: 2019-07-29 | End: 2019-07-31 | Stop reason: HOSPADM

## 2019-07-29 RX ORDER — RANOLAZINE 500 MG/1
500 TABLET, EXTENDED RELEASE ORAL 2 TIMES DAILY
Status: DISCONTINUED | OUTPATIENT
Start: 2019-07-29 | End: 2019-07-31 | Stop reason: HOSPADM

## 2019-07-29 RX ORDER — FAMOTIDINE 20 MG/1
20 TABLET, FILM COATED ORAL 2 TIMES DAILY
Status: DISCONTINUED | OUTPATIENT
Start: 2019-07-29 | End: 2019-07-31

## 2019-07-29 RX ORDER — HYDRALAZINE HYDROCHLORIDE 50 MG/1
100 TABLET, FILM COATED ORAL 3 TIMES DAILY
Status: DISCONTINUED | OUTPATIENT
Start: 2019-07-29 | End: 2019-07-29 | Stop reason: CLARIF

## 2019-07-29 RX ORDER — HEPARIN SODIUM 1000 [USP'U]/ML
4000 INJECTION, SOLUTION INTRAVENOUS; SUBCUTANEOUS PRN
Status: DISCONTINUED | OUTPATIENT
Start: 2019-07-29 | End: 2019-07-31 | Stop reason: HOSPADM

## 2019-07-29 RX ORDER — HEPARIN SODIUM 10000 [USP'U]/100ML
10.8 INJECTION, SOLUTION INTRAVENOUS CONTINUOUS
Status: DISCONTINUED | OUTPATIENT
Start: 2019-07-29 | End: 2019-07-30

## 2019-07-29 RX ORDER — AMLODIPINE BESYLATE 5 MG/1
5 TABLET ORAL DAILY
Status: DISCONTINUED | OUTPATIENT
Start: 2019-07-29 | End: 2019-07-31

## 2019-07-29 RX ORDER — DEXTROSE MONOHYDRATE 25 G/50ML
12.5 INJECTION, SOLUTION INTRAVENOUS PRN
Status: DISCONTINUED | OUTPATIENT
Start: 2019-07-29 | End: 2019-07-31 | Stop reason: HOSPADM

## 2019-07-29 RX ORDER — NITROGLYCERIN 0.4 MG/1
0.4 TABLET SUBLINGUAL ONCE
Status: COMPLETED | OUTPATIENT
Start: 2019-07-29 | End: 2019-07-29

## 2019-07-29 RX ADMIN — LABETALOL HYDROCHLORIDE 10 MG: 5 INJECTION, SOLUTION INTRAVENOUS at 11:40

## 2019-07-29 RX ADMIN — CARVEDILOL 25 MG: 25 TABLET, FILM COATED ORAL at 16:55

## 2019-07-29 RX ADMIN — CLOPIDOGREL BISULFATE 75 MG: 75 TABLET ORAL at 09:39

## 2019-07-29 RX ADMIN — RANOLAZINE 500 MG: 500 TABLET, FILM COATED, EXTENDED RELEASE ORAL at 22:18

## 2019-07-29 RX ADMIN — ISOSORBIDE MONONITRATE 30 MG: 30 TABLET, EXTENDED RELEASE ORAL at 14:14

## 2019-07-29 RX ADMIN — ASPIRIN 81 MG 81 MG: 81 TABLET ORAL at 09:39

## 2019-07-29 RX ADMIN — FUROSEMIDE 40 MG: 40 TABLET ORAL at 14:08

## 2019-07-29 RX ADMIN — FAMOTIDINE 20 MG: 20 TABLET, FILM COATED ORAL at 06:58

## 2019-07-29 RX ADMIN — FENTANYL CITRATE 50 MCG: 50 INJECTION, SOLUTION INTRAMUSCULAR; INTRAVENOUS at 15:05

## 2019-07-29 RX ADMIN — AMLODIPINE BESYLATE 5 MG: 5 TABLET ORAL at 14:18

## 2019-07-29 RX ADMIN — SODIUM CHLORIDE: 9 INJECTION, SOLUTION INTRAVENOUS at 00:44

## 2019-07-29 RX ADMIN — FAMOTIDINE 20 MG: 20 TABLET ORAL at 22:19

## 2019-07-29 RX ADMIN — CARVEDILOL 25 MG: 6.25 TABLET, FILM COATED ORAL at 16:55

## 2019-07-29 RX ADMIN — PREDNISONE 40 MG: 20 TABLET ORAL at 14:12

## 2019-07-29 RX ADMIN — HYDRALAZINE HYDROCHLORIDE 100 MG: 50 TABLET, FILM COATED ORAL at 14:10

## 2019-07-29 RX ADMIN — MIDAZOLAM HYDROCHLORIDE 2 MG: 1 INJECTION INTRAMUSCULAR; INTRAVENOUS at 11:38

## 2019-07-29 RX ADMIN — ROSUVASTATIN CALCIUM 40 MG: 10 TABLET, FILM COATED ORAL at 22:19

## 2019-07-29 RX ADMIN — RANOLAZINE 500 MG: 500 TABLET, FILM COATED, EXTENDED RELEASE ORAL at 14:13

## 2019-07-29 RX ADMIN — Medication 10 ML: at 22:20

## 2019-07-29 RX ADMIN — DIPHENHYDRAMINE HCL 25 MG: 25 TABLET ORAL at 06:58

## 2019-07-29 RX ADMIN — HYDRALAZINE HYDROCHLORIDE 100 MG: 25 TABLET, FILM COATED ORAL at 22:18

## 2019-07-29 RX ADMIN — FENTANYL CITRATE 50 MCG: 50 INJECTION, SOLUTION INTRAMUSCULAR; INTRAVENOUS at 11:40

## 2019-07-29 RX ADMIN — HEPARIN SODIUM 9.3 ML/HR: 10000 INJECTION, SOLUTION INTRAVENOUS at 17:00

## 2019-07-29 RX ADMIN — PREDNISONE 40 MG: 10 TABLET ORAL at 06:57

## 2019-07-29 RX ADMIN — NITROGLYCERIN 0.4 MG: 0.4 TABLET SUBLINGUAL at 15:05

## 2019-07-29 ASSESSMENT — PAIN SCALES - GENERAL
PAINLEVEL_OUTOF10: 0

## 2019-07-29 NOTE — H&P
Blood Gluc Sensor (FREESTYLE SAL SENSOR SYSTEM) MISC Every 10 days 6/1/18   Cierra Ghosh MD   Elastic Bandages & Supports (MEDICAL COMPRESSION STOCKINGS) MISC 15-20 mmHg compression, wear daily during the day 5/24/18   Carla Vital MD   Blood Glucose Monitoring Suppl (EASY TOUCH GLUCOSE SYSTEM) w/Device KIT Use four times a day and as needed 5/10/18   Travon Parra MD   insulin lispro (HUMALOG KWIKPEN) 100 UNIT/ML pen Inject 6 Units into the skin 3 times daily (before meals) 5/10/18   Travon Parra MD   Insulin Pen Needle 31G X 5 MM MISC 1 each by Does not apply route 4 times daily 5/10/18   Travon Parra MD   EASY TOUCH LANCETS 32G MISC Use 3 times daily with meals and at bed time 5/10/18   Travon Parra MD   insulin glargine (LANTUS SOLOSTAR) 100 UNIT/ML injection pen Inject 30 Units into the skin nightly  Patient taking differently: Inject 24 Units into the skin nightly  5/11/18   Travon Parra MD   glucose blood VI test strips (EASY TOUCH TEST) strip 1 each by In Vitro route 4 times daily 5/10/18   Travon Parra MD   sildenafil (VIAGRA) 100 MG tablet Take 1 tablet by mouth as needed for Erectile Dysfunction 2/5/18   Malcolm Connor MD   aspirin 81 MG tablet Take 1 tablet by mouth daily 10/26/16   Pedrito Cheung APRN - CNP   Multiple Vitamins-Minerals (THERAPEUTIC MULTIVITAMIN-MINERALS) tablet Take 1 tablet by mouth daily. Historical Provider, MD   Nutritional Supplements (PROTEIN SUPPLEMENT 80% PO) Take  by mouth. Historical Provider, MD       Allergies: Iodides and Shellfish-derived products    Social History:      The patient currently lives at home    TOBACCO:   reports that he has never smoked. He has never used smokeless tobacco.  ETOH:   reports that he drinks alcohol. Family History:      Reviewed in detail and negative for ESRD.   Positive as follows:        Problem Relation Age of Onset    Coronary Art Dis Father     Diabetes Father     Coronary Art Dis Paternal results for input(s): Lio Aguayo in the last 72 hours. Urinalysis:      Lab Results   Component Value Date    NITRU Negative 07/24/2019    WBCUA 0-2 07/24/2019    BACTERIA Rare 05/25/2010    RBCUA 0-2 07/24/2019    BLOODU Negative 07/24/2019    SPECGRAV 1.020 07/24/2019    GLUCOSEU 500 07/24/2019    GLUCOSEU >=1000 05/25/2010       Radiology:     CXR: I have reviewed the CXR with the following interpretation: none today  EKG:  I have reviewed the EKG with the following interpretation: sinus tach, nonspecific ST and TW abnormalities    No orders to display       ASSESSMENT:    Active Hospital Problems    Diagnosis Date Noted    NSVT (nonsustained ventricular tachycardia) ([facility] [address] [I47.2] 07/29/2019    Atrial fibrillation with rapid ventricular response ([facility] [address] [I48.91] 07/25/2019    Syncope [R55]     CRI (chronic renal insufficiency) ([facility]) [N18.9] 03/03/2015    Coronary artery disease involving native heart with angina pectoris [facility]) [I25.119] 05/26/2010         PLAN:    The patient is a 48 Y M with a h/o DM1 diagnosed at age 17. He says that his A1c has been 14 for most of his life because he was angry about his diagnosis and had an aversion to medical care. This has left him with CAD s/p CABG, CHF, and CKD4. He didn't really start to get his diabetes under control until about 4 years ago, when he put forth more effort, started seeing an endocrinologist, and started using an insulin pump. This got his A1c down to about 8. Unfortunately his diabetes has seemed to get more brittle over the course of the last year, and he has been dealing with somewhat random highs and lows. This has been further complicated by the fact that he lost his health insurance and he makes a little too much money for medicaid. He has been doing the best he can, paying cash for regular insulin and continues to use his insulin pump.   He doesn't have enough money for the sensor portion of the insulin pump, so he has been work note upon discharge. Ondina Adhikari MD    Thank you No primary care provider on file. for the opportunity to be involved in this patient's care. If you have any questions or concerns please feel free to contact me at [phone].

## 2019-07-30 ENCOUNTER — APPOINTMENT (OUTPATIENT)
Dept: CARDIAC CATH/INVASIVE PROCEDURES | Age: 54
DRG: 287 | End: 2019-07-30
Attending: INTERNAL MEDICINE

## 2019-07-30 LAB
ANION GAP SERPL CALCULATED.3IONS-SCNC: 13 MMOL/L (ref 3–16)
APTT: 49.2 SEC (ref 26–36)
APTT: 98.2 SEC (ref 26–36)
BASOPHILS ABSOLUTE: 0 K/UL (ref 0–0.2)
BASOPHILS RELATIVE PERCENT: 0.1 %
BUN BLDV-MCNC: 65 MG/DL (ref 7–20)
CALCIUM SERPL-MCNC: 8.3 MG/DL (ref 8.3–10.6)
CHLORIDE BLD-SCNC: 100 MMOL/L (ref 99–110)
CO2: 22 MMOL/L (ref 21–32)
CREAT SERPL-MCNC: 4.9 MG/DL (ref 0.9–1.3)
EKG ATRIAL RATE: 101 BPM
EKG DIAGNOSIS: NORMAL
EKG P AXIS: 68 DEGREES
EKG P-R INTERVAL: 142 MS
EKG Q-T INTERVAL: 350 MS
EKG QRS DURATION: 100 MS
EKG QTC CALCULATION (BAZETT): 453 MS
EKG R AXIS: 74 DEGREES
EKG T AXIS: -38 DEGREES
EKG VENTRICULAR RATE: 101 BPM
EOSINOPHILS ABSOLUTE: 0 K/UL (ref 0–0.6)
EOSINOPHILS RELATIVE PERCENT: 0 %
GFR AFRICAN AMERICAN: 15
GFR NON-AFRICAN AMERICAN: 13
GLUCOSE BLD-MCNC: 274 MG/DL (ref 70–99)
GLUCOSE BLD-MCNC: 277 MG/DL (ref 70–99)
GLUCOSE BLD-MCNC: 333 MG/DL (ref 70–99)
HCT VFR BLD CALC: 44.3 % (ref 40.5–52.5)
HEMOGLOBIN: 14.4 G/DL (ref 13.5–17.5)
LYMPHOCYTES ABSOLUTE: 0.8 K/UL (ref 1–5.1)
LYMPHOCYTES RELATIVE PERCENT: 5.8 %
MCH RBC QN AUTO: 27.8 PG (ref 26–34)
MCHC RBC AUTO-ENTMCNC: 32.4 G/DL (ref 31–36)
MCV RBC AUTO: 85.8 FL (ref 80–100)
MONOCYTES ABSOLUTE: 1 K/UL (ref 0–1.3)
MONOCYTES RELATIVE PERCENT: 7.4 %
NEUTROPHILS ABSOLUTE: 11.4 K/UL (ref 1.7–7.7)
NEUTROPHILS RELATIVE PERCENT: 86.7 %
PDW BLD-RTO: 15.4 % (ref 12.4–15.4)
PERFORMED ON: ABNORMAL
PERFORMED ON: ABNORMAL
PLATELET # BLD: 106 K/UL (ref 135–450)
PMV BLD AUTO: 11.1 FL (ref 5–10.5)
POTASSIUM SERPL-SCNC: 4.5 MMOL/L (ref 3.5–5.1)
RBC # BLD: 5.16 M/UL (ref 4.2–5.9)
SODIUM BLD-SCNC: 135 MMOL/L (ref 136–145)
WBC # BLD: 13.2 K/UL (ref 4–11)

## 2019-07-30 PROCEDURE — 93620 COMP EP EVL R AT VEN PAC&REC: CPT | Performed by: INTERNAL MEDICINE

## 2019-07-30 PROCEDURE — 6360000002 HC RX W HCPCS: Performed by: INTERNAL MEDICINE

## 2019-07-30 PROCEDURE — 2060000000 HC ICU INTERMEDIATE R&B

## 2019-07-30 PROCEDURE — 6370000000 HC RX 637 (ALT 250 FOR IP): Performed by: INTERNAL MEDICINE

## 2019-07-30 PROCEDURE — 80048 BASIC METABOLIC PNL TOTAL CA: CPT

## 2019-07-30 PROCEDURE — 93010 ELECTROCARDIOGRAM REPORT: CPT | Performed by: INTERNAL MEDICINE

## 2019-07-30 PROCEDURE — 93005 ELECTROCARDIOGRAM TRACING: CPT | Performed by: INTERNAL MEDICINE

## 2019-07-30 PROCEDURE — C1730 CATH, EP, 19 OR FEW ELECT: HCPCS

## 2019-07-30 PROCEDURE — 85025 COMPLETE CBC W/AUTO DIFF WBC: CPT

## 2019-07-30 PROCEDURE — 6370000000 HC RX 637 (ALT 250 FOR IP): Performed by: NURSE PRACTITIONER

## 2019-07-30 PROCEDURE — 93620 COMP EP EVL R AT VEN PAC&REC: CPT

## 2019-07-30 PROCEDURE — 2580000003 HC RX 258: Performed by: INTERNAL MEDICINE

## 2019-07-30 PROCEDURE — 85730 THROMBOPLASTIN TIME PARTIAL: CPT

## 2019-07-30 PROCEDURE — 36415 COLL VENOUS BLD VENIPUNCTURE: CPT

## 2019-07-30 PROCEDURE — C1894 INTRO/SHEATH, NON-LASER: HCPCS

## 2019-07-30 PROCEDURE — 6360000002 HC RX W HCPCS

## 2019-07-30 PROCEDURE — 2709999900 HC NON-CHARGEABLE SUPPLY

## 2019-07-30 PROCEDURE — 2500000003 HC RX 250 WO HCPCS

## 2019-07-30 PROCEDURE — 2580000003 HC RX 258

## 2019-07-30 RX ORDER — SODIUM CHLORIDE 0.9 % (FLUSH) 0.9 %
10 SYRINGE (ML) INJECTION EVERY 12 HOURS SCHEDULED
Status: DISCONTINUED | OUTPATIENT
Start: 2019-07-30 | End: 2019-07-30 | Stop reason: SDUPTHER

## 2019-07-30 RX ORDER — MIDAZOLAM HYDROCHLORIDE 5 MG/ML
INJECTION INTRAMUSCULAR; INTRAVENOUS
Status: COMPLETED | OUTPATIENT
Start: 2019-07-30 | End: 2019-07-30

## 2019-07-30 RX ORDER — SODIUM CHLORIDE 0.9 % (FLUSH) 0.9 %
10 SYRINGE (ML) INJECTION PRN
Status: DISCONTINUED | OUTPATIENT
Start: 2019-07-30 | End: 2019-07-30 | Stop reason: SDUPTHER

## 2019-07-30 RX ORDER — FENTANYL CITRATE 50 UG/ML
INJECTION, SOLUTION INTRAMUSCULAR; INTRAVENOUS
Status: COMPLETED | OUTPATIENT
Start: 2019-07-30 | End: 2019-07-30

## 2019-07-30 RX ORDER — ACETAMINOPHEN 325 MG/1
650 TABLET ORAL EVERY 4 HOURS PRN
Status: DISCONTINUED | OUTPATIENT
Start: 2019-07-30 | End: 2019-07-30 | Stop reason: SDUPTHER

## 2019-07-30 RX ORDER — HEPARIN SODIUM 1000 [USP'U]/ML
2000 INJECTION, SOLUTION INTRAVENOUS; SUBCUTANEOUS ONCE
Status: COMPLETED | OUTPATIENT
Start: 2019-07-30 | End: 2019-07-30

## 2019-07-30 RX ADMIN — HYDRALAZINE HYDROCHLORIDE 100 MG: 25 TABLET, FILM COATED ORAL at 08:42

## 2019-07-30 RX ADMIN — Medication 10 ML: at 20:51

## 2019-07-30 RX ADMIN — ASPIRIN 81 MG 81 MG: 81 TABLET ORAL at 08:42

## 2019-07-30 RX ADMIN — HYDRALAZINE HYDROCHLORIDE 100 MG: 25 TABLET, FILM COATED ORAL at 15:43

## 2019-07-30 RX ADMIN — CARVEDILOL 25 MG: 25 TABLET, FILM COATED ORAL at 15:34

## 2019-07-30 RX ADMIN — FENTANYL CITRATE 50 MCG: 50 INJECTION, SOLUTION INTRAMUSCULAR; INTRAVENOUS at 10:00

## 2019-07-30 RX ADMIN — HEPARIN SODIUM 2000 UNITS: 1000 INJECTION INTRAVENOUS; SUBCUTANEOUS at 04:34

## 2019-07-30 RX ADMIN — RANOLAZINE 500 MG: 500 TABLET, FILM COATED, EXTENDED RELEASE ORAL at 08:42

## 2019-07-30 RX ADMIN — MIDAZOLAM HYDROCHLORIDE 2 MG: 5 INJECTION INTRAMUSCULAR; INTRAVENOUS at 10:00

## 2019-07-30 RX ADMIN — RANOLAZINE 500 MG: 500 TABLET, FILM COATED, EXTENDED RELEASE ORAL at 20:50

## 2019-07-30 RX ADMIN — AMLODIPINE BESYLATE 5 MG: 5 TABLET ORAL at 08:42

## 2019-07-30 RX ADMIN — ISOSORBIDE MONONITRATE 30 MG: 30 TABLET, EXTENDED RELEASE ORAL at 08:42

## 2019-07-30 RX ADMIN — CLOPIDOGREL BISULFATE 75 MG: 75 TABLET ORAL at 08:42

## 2019-07-30 RX ADMIN — HYDRALAZINE HYDROCHLORIDE 100 MG: 25 TABLET, FILM COATED ORAL at 20:50

## 2019-07-30 RX ADMIN — DIPHENHYDRAMINE HCL 25 MG: 25 TABLET ORAL at 00:48

## 2019-07-30 RX ADMIN — ROSUVASTATIN CALCIUM 40 MG: 10 TABLET, FILM COATED ORAL at 20:49

## 2019-07-30 RX ADMIN — FAMOTIDINE 20 MG: 20 TABLET ORAL at 08:43

## 2019-07-30 RX ADMIN — FUROSEMIDE 40 MG: 40 TABLET ORAL at 08:42

## 2019-07-30 RX ADMIN — FENTANYL CITRATE 50 MCG: 50 INJECTION, SOLUTION INTRAMUSCULAR; INTRAVENOUS at 10:18

## 2019-07-30 RX ADMIN — FAMOTIDINE 20 MG: 20 TABLET ORAL at 20:50

## 2019-07-30 RX ADMIN — APIXABAN 5 MG: 5 TABLET, FILM COATED ORAL at 18:06

## 2019-07-30 ASSESSMENT — PAIN DESCRIPTION - PAIN TYPE: TYPE: ACUTE PAIN

## 2019-07-30 ASSESSMENT — PAIN SCALES - GENERAL
PAINLEVEL_OUTOF10: 2
PAINLEVEL_OUTOF10: 0

## 2019-07-30 ASSESSMENT — PAIN DESCRIPTION - LOCATION: LOCATION: CHEST

## 2019-07-30 NOTE — PLAN OF CARE
Patient will remain free from falls this shift. Call light within reach, bedside table within reach, bed in lowest position, bed alarm on, 2/4 rails raised, bed in locked position, phone within patient's reach, non-skid socks on, fall risk wristband applied. Patient instructed to call out if he needs to get up or needs any assistance, RN instructed patient not to ambulate without assistance. Patient verbalized understanding. RN will continue to monitor.

## 2019-07-30 NOTE — CONSULTS
Pt came from [place] with  Low dose heparin. Last aPTT 54.2 @ 0618 on 7/29  Using dosing weight of 77.3 Kg  Went to [facility]. Ordered to resume heparin at previous rate of 9.3ml/hr @ 1600 No bolus given  Heparin not restarted until 1700. Ordered aPTT for 2300.    7/30 0332  aptt = 49.2 sec. 2000 unit bolus and rate = 10.8 ml/hr. Next aptt 1000.   Imtiaz Lance, Pharm D.7/30/2019 4:18 AM

## 2019-07-30 NOTE — PROCEDURES
MD    D: 07/30/2019 10:45:00       T: 07/30/2019 11:41:34     [email or web address]  Job#: [id number]     Doc#: [id number]    CC:

## 2019-07-31 ENCOUNTER — APPOINTMENT (OUTPATIENT)
Dept: GENERAL RADIOLOGY | Age: 54
DRG: 287 | End: 2019-07-31
Attending: INTERNAL MEDICINE

## 2019-07-31 VITALS
WEIGHT: 198.9 LBS | TEMPERATURE: 97.7 F | OXYGEN SATURATION: 98 % | SYSTOLIC BLOOD PRESSURE: 144 MMHG | BODY MASS INDEX: 30.14 KG/M2 | DIASTOLIC BLOOD PRESSURE: 96 MMHG | HEART RATE: 81 BPM | HEIGHT: 68 IN | RESPIRATION RATE: 16 BRPM

## 2019-07-31 LAB
ALBUMIN SERPL-MCNC: 3.4 G/DL (ref 3.4–5)
ANION GAP SERPL CALCULATED.3IONS-SCNC: 10 MMOL/L (ref 3–16)
BASOPHILS ABSOLUTE: 0 K/UL (ref 0–0.2)
BASOPHILS RELATIVE PERCENT: 0.2 %
BUN BLDV-MCNC: 72 MG/DL (ref 7–20)
CALCIUM SERPL-MCNC: 8 MG/DL (ref 8.3–10.6)
CHLORIDE BLD-SCNC: 103 MMOL/L (ref 99–110)
CO2: 24 MMOL/L (ref 21–32)
CREAT SERPL-MCNC: 4.8 MG/DL (ref 0.9–1.3)
EKG ATRIAL RATE: 129 BPM
EKG DIAGNOSIS: NORMAL
EKG Q-T INTERVAL: 276 MS
EKG QRS DURATION: 94 MS
EKG QTC CALCULATION (BAZETT): 401 MS
EKG R AXIS: 60 DEGREES
EKG T AXIS: -67 DEGREES
EKG VENTRICULAR RATE: 127 BPM
EOSINOPHILS ABSOLUTE: 0 K/UL (ref 0–0.6)
EOSINOPHILS RELATIVE PERCENT: 0.2 %
GFR AFRICAN AMERICAN: 16
GFR NON-AFRICAN AMERICAN: 13
GLUCOSE BLD-MCNC: 170 MG/DL (ref 70–99)
GLUCOSE BLD-MCNC: 182 MG/DL (ref 70–99)
GLUCOSE BLD-MCNC: 195 MG/DL (ref 70–99)
GLUCOSE BLD-MCNC: 260 MG/DL (ref 70–99)
HCT VFR BLD CALC: 46.3 % (ref 40.5–52.5)
HEMOGLOBIN: 15.2 G/DL (ref 13.5–17.5)
LYMPHOCYTES ABSOLUTE: 1.4 K/UL (ref 1–5.1)
LYMPHOCYTES RELATIVE PERCENT: 16.9 %
MCH RBC QN AUTO: 28.3 PG (ref 26–34)
MCHC RBC AUTO-ENTMCNC: 32.9 G/DL (ref 31–36)
MCV RBC AUTO: 86.2 FL (ref 80–100)
MONOCYTES ABSOLUTE: 1 K/UL (ref 0–1.3)
MONOCYTES RELATIVE PERCENT: 12.1 %
NEUTROPHILS ABSOLUTE: 6 K/UL (ref 1.7–7.7)
NEUTROPHILS RELATIVE PERCENT: 70.6 %
PDW BLD-RTO: 15.3 % (ref 12.4–15.4)
PERFORMED ON: ABNORMAL
PHOSPHORUS: 4.2 MG/DL (ref 2.5–4.9)
PLATELET # BLD: 102 K/UL (ref 135–450)
PMV BLD AUTO: 10.8 FL (ref 5–10.5)
POTASSIUM SERPL-SCNC: 4.2 MMOL/L (ref 3.5–5.1)
RBC # BLD: 5.37 M/UL (ref 4.2–5.9)
SODIUM BLD-SCNC: 137 MMOL/L (ref 136–145)
WBC # BLD: 8.5 K/UL (ref 4–11)

## 2019-07-31 PROCEDURE — 6370000000 HC RX 637 (ALT 250 FOR IP): Performed by: INTERNAL MEDICINE

## 2019-07-31 PROCEDURE — 80069 RENAL FUNCTION PANEL: CPT

## 2019-07-31 PROCEDURE — 99233 SBSQ HOSP IP/OBS HIGH 50: CPT | Performed by: INTERNAL MEDICINE

## 2019-07-31 PROCEDURE — 36415 COLL VENOUS BLD VENIPUNCTURE: CPT

## 2019-07-31 PROCEDURE — 71046 X-RAY EXAM CHEST 2 VIEWS: CPT

## 2019-07-31 PROCEDURE — 2500000003 HC RX 250 WO HCPCS: Performed by: INTERNAL MEDICINE

## 2019-07-31 PROCEDURE — 85025 COMPLETE CBC W/AUTO DIFF WBC: CPT

## 2019-07-31 PROCEDURE — 2580000003 HC RX 258: Performed by: INTERNAL MEDICINE

## 2019-07-31 PROCEDURE — 93010 ELECTROCARDIOGRAM REPORT: CPT | Performed by: INTERNAL MEDICINE

## 2019-07-31 RX ORDER — NITROGLYCERIN 0.4 MG/1
0.4 TABLET SUBLINGUAL EVERY 5 MIN PRN
Qty: 25 TABLET | Refills: 3 | Status: SHIPPED | OUTPATIENT
Start: 2019-07-31 | End: 2020-02-28 | Stop reason: SDUPTHER

## 2019-07-31 RX ORDER — DILTIAZEM HYDROCHLORIDE 120 MG/1
120 CAPSULE, COATED, EXTENDED RELEASE ORAL DAILY
Status: DISCONTINUED | OUTPATIENT
Start: 2019-07-31 | End: 2019-07-31 | Stop reason: HOSPADM

## 2019-07-31 RX ORDER — ISOSORBIDE MONONITRATE 30 MG/1
30 TABLET, EXTENDED RELEASE ORAL DAILY
Qty: 30 TABLET | Refills: 3 | Status: SHIPPED | OUTPATIENT
Start: 2019-08-01 | End: 2019-12-23 | Stop reason: SDUPTHER

## 2019-07-31 RX ORDER — FUROSEMIDE 40 MG/1
40 TABLET ORAL DAILY
Qty: 60 TABLET | Refills: 3 | Status: SHIPPED | OUTPATIENT
Start: 2019-08-01 | End: 2020-01-27

## 2019-07-31 RX ORDER — CARVEDILOL 25 MG/1
TABLET ORAL
Qty: 90 TABLET | Refills: 5
Start: 2019-07-31 | End: 2019-10-29 | Stop reason: SDUPTHER

## 2019-07-31 RX ORDER — HYDRALAZINE HYDROCHLORIDE 100 MG/1
100 TABLET, FILM COATED ORAL 3 TIMES DAILY
Qty: 90 TABLET | Refills: 3 | Status: SHIPPED | OUTPATIENT
Start: 2019-07-31 | End: 2019-12-23 | Stop reason: SDUPTHER

## 2019-07-31 RX ORDER — DILTIAZEM HYDROCHLORIDE 120 MG/1
120 CAPSULE, COATED, EXTENDED RELEASE ORAL DAILY
Qty: 30 CAPSULE | Refills: 3 | Status: SHIPPED | OUTPATIENT
Start: 2019-07-31 | End: 2019-12-23 | Stop reason: SDUPTHER

## 2019-07-31 RX ORDER — FAMOTIDINE 20 MG/1
20 TABLET, FILM COATED ORAL DAILY
Status: DISCONTINUED | OUTPATIENT
Start: 2019-08-01 | End: 2019-07-31 | Stop reason: HOSPADM

## 2019-07-31 RX ADMIN — FUROSEMIDE 40 MG: 40 TABLET ORAL at 09:39

## 2019-07-31 RX ADMIN — SODIUM CHLORIDE, PRESERVATIVE FREE 10 ML: 5 INJECTION INTRAVENOUS at 00:09

## 2019-07-31 RX ADMIN — AMLODIPINE BESYLATE 5 MG: 5 TABLET ORAL at 07:03

## 2019-07-31 RX ADMIN — SODIUM CHLORIDE, PRESERVATIVE FREE 10 ML: 5 INJECTION INTRAVENOUS at 05:51

## 2019-07-31 RX ADMIN — ASPIRIN 81 MG 81 MG: 81 TABLET ORAL at 09:39

## 2019-07-31 RX ADMIN — LABETALOL HYDROCHLORIDE 5 MG: 5 INJECTION INTRAVENOUS at 05:50

## 2019-07-31 RX ADMIN — APIXABAN 5 MG: 5 TABLET, FILM COATED ORAL at 09:39

## 2019-07-31 RX ADMIN — Medication 10 ML: at 09:39

## 2019-07-31 RX ADMIN — ISOSORBIDE MONONITRATE 30 MG: 30 TABLET, EXTENDED RELEASE ORAL at 09:39

## 2019-07-31 RX ADMIN — LABETALOL HYDROCHLORIDE 5 MG: 5 INJECTION INTRAVENOUS at 00:08

## 2019-07-31 RX ADMIN — HYDRALAZINE HYDROCHLORIDE 100 MG: 25 TABLET, FILM COATED ORAL at 14:12

## 2019-07-31 RX ADMIN — DILTIAZEM HYDROCHLORIDE 120 MG: 120 CAPSULE, COATED, EXTENDED RELEASE ORAL at 14:12

## 2019-07-31 RX ADMIN — CARVEDILOL 25 MG: 25 TABLET, FILM COATED ORAL at 16:45

## 2019-07-31 RX ADMIN — RANOLAZINE 500 MG: 500 TABLET, FILM COATED, EXTENDED RELEASE ORAL at 09:39

## 2019-07-31 RX ADMIN — FAMOTIDINE 20 MG: 20 TABLET ORAL at 09:39

## 2019-07-31 RX ADMIN — CARVEDILOL 25 MG: 25 TABLET, FILM COATED ORAL at 09:44

## 2019-07-31 RX ADMIN — HYDRALAZINE HYDROCHLORIDE 100 MG: 25 TABLET, FILM COATED ORAL at 09:39

## 2019-07-31 ASSESSMENT — PAIN SCALES - GENERAL
PAINLEVEL_OUTOF10: 0

## 2019-07-31 NOTE — PLAN OF CARE
Problem: Fluid Volume:  Goal: Risk for excess fluid volume will decrease  Description  Risk for excess fluid volume will decrease  Outcome: Ongoing

## 2019-07-31 NOTE — PROGRESS NOTES
Approx. 5 minutes spent on education.
Diabetes education provided today:    Insulin pumps, how they work and how they affect blood sugar levels.
Has motion,warmth, brisk cap refill, plus 2 pulses of ble. Has slightly decreased sensation in kike toes , left moreso than right but stated that he had that before he came here and that it is r/t neuropathy.
Hospitalist Progress Note      PCP: No primary care provider on file. Date of Admission: 7/29/2019    Chief Complaint: Chest pain    Hospital Course: The patient is a 48 Y M with a h/o DM1 diagnosed at age 17. He says that his A1c has been 14 for most of his life because he was angry about his diagnosis and had an aversion to medical care. This has left him with CAD s/p CABG, CHF, and CKD4. He didn't really start to get his diabetes under control until about 4 years ago, when he put forth more effort, started seeing an endocrinologist, and started using an insulin pump. This got his A1c down to about 8. Unfortunately his diabetes has seemed to get more brittle over the course of the last year, and he has been dealing with somewhat random highs and lows. This has been further complicated by the fact that he lost his health insurance and he makes a little too much money for medicaid. He has been doing the best he can, paying cash for regular insulin and continues to use his insulin pump. He doesn't have enough money for the sensor portion of the insulin pump, so he has been manually checking his insulin at least 10x/day. This has resulted in an A1c of 11.5. The patient originally presented to [facility] ED on 7/24 after a presumed spell of hypoglycemia. He was at work, started feeling hypoglycemic, wandered out to his car for a break. Security found him in a confused, lethargic state, and the patient had a near-syncopal spell when he stepped out of the care. Security recognized this as probable hypoglycemia, and gave him two candy bars and two sodas. When EMS arrived his blood sugar was 400. In the [facility] ED the patient complained of chest pain. Cardiology was consulted, he had a TTE, and he was eventually transferred to [facility] on 7/29 for a LHC, which did not require PCI.        Subjective:  He is feeling OK this AM.  No further chest pain of NSVT.   Perhaps we can plan on discharging with a
Patient returned from cath lab in calm and stable condition. Bedrest until 1500. Heparin gtt still off. RN to place diet order. VSS and assessment completed. Right groin site WNL. RN will continue to monitor.
Patient unwilling to have event monitor placed due to not having insurance. Cardiology notified.
RN called lab as AM labs had not yet been drawn. They were unable to see orders. RN changed the orders to STAT and having lab come to draw them. RN also paged Cardiology    Is the patient able to eat? No diet orders in place s/p cath yesterday. Hospitalist asked that I verify with Cardiology before I place a diet order. Thank you.
RN has call to Cardiology to see if patient needs the Heparin gtt restarted.
Transfer to C4 from     Clotilde Edna and Angie Rodney performed complete skin assessment on transfer. Assessment revealed no skin issues. Upon transferring patient to ordered level of care, patients medications were gathered from the following locations and given to receiving nurse during bedside report:      Lock Box in room :     [x] Yes   [] No   Pyxis Bin:       [x] Yes   [] No      Pyxis Refrigerator:      [x] Yes   [] No   Tube System:       [x] Yes   [] No   LDA's documented:  [x] Yes   [] No          The following paperwork was transferred with patient:    Blue medication book:       [x] Yes   [] No      12 hour chart check:       [x] Yes   [] No   Patient belongings:       [x] Yes   [] No      Continuous pulse ox:   [x] Yes   [] No      [] N/A      Tele monitor assigned to patient and placed on patient prior to transfer.     CMU Notified at Transfer: [x] Yes   [] No     Name of [address] [place] [address] Staff:  _____Lucia_______________________       MD notified via Perfect Serve:   [] Yes   [x] No    Family notified of transfer:    [x] Yes   [] No    Spoke with:  ___________________________________
labetolol 5g iv given for bp 166/86.
TROPONINI 0.12 07/25/2019     PT/ INR   Lab Results   Component Value Date    INR 1.03 07/06/2017    INR 0.98 10/26/2016    INR 1.00 10/25/2016    PROTIME 11.6 07/06/2017    PROTIME 11.2 10/26/2016    PROTIME 11.4 10/25/2016     PTT No results found for: PTT   Lab Results   Component Value Date    MG 2.20 07/25/2019      Lab Results   Component Value Date    TSH 2.44 03/13/2015       Assessment:     1. Syncope- likely related to transient hypotension as it was immediately preceded by a change in posture from sitting to standing. 2. Ischemic cardiomyopathy- he had no inducible sustained VT with EPS. 3. PAF- 2 self limited episodes on this admission- asymptomatic. Ambulatory monitoring will be necessary to determine the AF burden. 4. CKD- no change in the creatinine clearance after contrast exposure    Plan:     1. apixaban 5 mg q 12h  2. Ambulatory ECG monitoring post discharge to determine the AF burden.   3. F/U with EP 2 months      Rebecca Jacobo MD
Tab

## 2019-07-31 NOTE — DISCHARGE SUMMARY
Hospital Medicine Discharge Summary    Patient ID: Rio Durham      Patient's PCP: No primary care provider on file. Admit Date: 7/29/2019     Discharge Date:   07/31/19     Admitting Physician: Margi Borrero MD     Discharge Physician: Echo Hoff MD     Discharge Diagnoses: Active Hospital Problems    Diagnosis    NSVT (nonsustained ventricular tachycardia) ([facility]) [I47.2]    Atrial fibrillation with rapid ventricular response ([facility]) [I48.91]    Syncope [R55]    CRI (chronic renal insufficiency) ([facility]) [N18.9]    Coronary artery disease involving native heart with angina pectoris ([facility] [address] [I25.119]       The patient was seen and examined on day of discharge and this discharge summary is in conjunction with any daily progress note from day of discharge. Hospital Course: The patient is a 48 Y M with a h/o DM1 diagnosed at age 17. [facility] says that his A1c has been 14 for most of his life because he was angry about his diagnosis and had an aversion to medical care.  This has left him with CAD s/p CABG, CHF, and Clay Class didn't really start to get his diabetes under control until about 4 years ago, when he put forth more effort, started seeing an endocrinologist, and started using an insulin pump.  This got his A1c down to about 8.  Unfortunately his diabetes has seemed to get more brittle over the course of the last year, and he has been dealing with somewhat random highs and lows.  This has been further complicated by the fact that he lost his health insurance and he makes a little too much money for medicaid. [facility] has been doing the best he can, paying cash for regular insulin and continues to use his insulin pump.  He doesn't have enough money for the sensor portion of the insulin pump, so he has been manually checking his insulin at least 10x/day.  This has resulted in an A1c of 11.5.                The patient originally presented to [facility] ED on 7/24 after a presumed spell of hypoglycemia.

## 2019-09-03 RX ORDER — CLOPIDOGREL BISULFATE 75 MG/1
TABLET ORAL
Qty: 30 TABLET | Refills: 0 | Status: SHIPPED | OUTPATIENT
Start: 2019-09-03 | End: 2020-02-28

## 2019-09-04 ENCOUNTER — TELEPHONE (OUTPATIENT)
Dept: ENDOCRINOLOGY | Age: 54
End: 2019-09-04

## 2019-10-23 ENCOUNTER — APPOINTMENT (OUTPATIENT)
Dept: GENERAL RADIOLOGY | Age: 54
DRG: 638 | End: 2019-10-23
Payer: COMMERCIAL

## 2019-10-23 ENCOUNTER — HOSPITAL ENCOUNTER (INPATIENT)
Age: 54
LOS: 1 days | Discharge: HOME | DRG: 638 | End: 2019-10-24
Attending: EMERGENCY MEDICINE | Admitting: HOSPITALIST
Payer: COMMERCIAL

## 2019-10-23 DIAGNOSIS — E87.5 HYPERKALEMIA: ICD-10-CM

## 2019-10-23 DIAGNOSIS — R07.9 CHEST PAIN, UNSPECIFIED TYPE: ICD-10-CM

## 2019-10-23 DIAGNOSIS — E10.10 TYPE 1 DIABETES MELLITUS WITH KETOACIDOSIS WITHOUT COMA (HCC): Primary | ICD-10-CM

## 2019-10-23 LAB
ANION GAP SERPL CALCULATED.3IONS-SCNC: 15 MMOL/L (ref 3–16)
ANION GAP SERPL CALCULATED.3IONS-SCNC: 22 MMOL/L (ref 3–16)
ANION GAP SERPL CALCULATED.3IONS-SCNC: 25 MMOL/L (ref 3–16)
BASE EXCESS VENOUS: -6.5 MMOL/L
BASOPHILS ABSOLUTE: 0.1 K/UL (ref 0–0.2)
BASOPHILS RELATIVE PERCENT: 0.7 %
BETA-HYDROXYBUTYRATE: 3.44 MMOL/L (ref 0–0.27)
BILIRUBIN URINE: NEGATIVE
BLOOD, URINE: NEGATIVE
BUN BLDV-MCNC: 63 MG/DL (ref 7–20)
BUN BLDV-MCNC: 66 MG/DL (ref 7–20)
BUN BLDV-MCNC: 66 MG/DL (ref 7–20)
CALCIUM SERPL-MCNC: 8.7 MG/DL (ref 8.3–10.6)
CALCIUM SERPL-MCNC: 9.1 MG/DL (ref 8.3–10.6)
CALCIUM SERPL-MCNC: 9.2 MG/DL (ref 8.3–10.6)
CARBOXYHEMOGLOBIN: 1.5 %
CHLORIDE BLD-SCNC: 104 MMOL/L (ref 99–110)
CHLORIDE BLD-SCNC: 88 MMOL/L (ref 99–110)
CHLORIDE BLD-SCNC: 97 MMOL/L (ref 99–110)
CHP ED QC CHECK: YES
CLARITY: CLEAR
CO2: 15 MMOL/L (ref 21–32)
CO2: 18 MMOL/L (ref 21–32)
CO2: 22 MMOL/L (ref 21–32)
COLOR: YELLOW
CREAT SERPL-MCNC: 4.7 MG/DL (ref 0.9–1.3)
EKG ATRIAL RATE: 81 BPM
EKG DIAGNOSIS: NORMAL
EKG P AXIS: 65 DEGREES
EKG P-R INTERVAL: 136 MS
EKG Q-T INTERVAL: 416 MS
EKG QRS DURATION: 100 MS
EKG QTC CALCULATION (BAZETT): 483 MS
EKG R AXIS: 87 DEGREES
EKG T AXIS: 34 DEGREES
EKG VENTRICULAR RATE: 81 BPM
EOSINOPHILS ABSOLUTE: 0 K/UL (ref 0–0.6)
EOSINOPHILS RELATIVE PERCENT: 0.3 %
EPITHELIAL CELLS, UA: 0 /HPF (ref 0–5)
GFR AFRICAN AMERICAN: 16
GFR NON-AFRICAN AMERICAN: 13
GLUCOSE BLD-MCNC: 209 MG/DL (ref 70–99)
GLUCOSE BLD-MCNC: 229 MG/DL (ref 70–99)
GLUCOSE BLD-MCNC: 248 MG/DL (ref 70–99)
GLUCOSE BLD-MCNC: 275 MG/DL (ref 70–99)
GLUCOSE BLD-MCNC: 309 MG/DL (ref 70–99)
GLUCOSE BLD-MCNC: 367 MG/DL (ref 70–99)
GLUCOSE BLD-MCNC: 424 MG/DL (ref 70–99)
GLUCOSE BLD-MCNC: 491 MG/DL
GLUCOSE BLD-MCNC: 491 MG/DL (ref 70–99)
GLUCOSE BLD-MCNC: 565 MG/DL (ref 70–99)
GLUCOSE BLD-MCNC: 565 MG/DL (ref 70–99)
GLUCOSE BLD-MCNC: 640 MG/DL (ref 70–99)
GLUCOSE BLD-MCNC: 897 MG/DL (ref 70–99)
GLUCOSE BLD-MCNC: >600 MG/DL (ref 70–99)
GLUCOSE URINE: >=1000 MG/DL
HCO3 VENOUS: 17 MMOL/L (ref 23–29)
HCT VFR BLD CALC: 48.8 % (ref 40.5–52.5)
HEMOGLOBIN: 15.4 G/DL (ref 13.5–17.5)
HYALINE CASTS: 0 /LPF (ref 0–8)
KETONES, URINE: 15 MG/DL
LACTIC ACID: 2.8 MMOL/L (ref 0.4–2)
LEUKOCYTE ESTERASE, URINE: NEGATIVE
LYMPHOCYTES ABSOLUTE: 0.6 K/UL (ref 1–5.1)
LYMPHOCYTES RELATIVE PERCENT: 7.6 %
MAGNESIUM: 2.5 MG/DL (ref 1.8–2.4)
MAGNESIUM: 2.5 MG/DL (ref 1.8–2.4)
MCH RBC QN AUTO: 28.8 PG (ref 26–34)
MCHC RBC AUTO-ENTMCNC: 31.6 G/DL (ref 31–36)
MCV RBC AUTO: 91.3 FL (ref 80–100)
METHEMOGLOBIN VENOUS: 0.8 %
MICROSCOPIC EXAMINATION: YES
MONOCYTES ABSOLUTE: 0.2 K/UL (ref 0–1.3)
MONOCYTES RELATIVE PERCENT: 2.7 %
NEUTROPHILS ABSOLUTE: 6.7 K/UL (ref 1.7–7.7)
NEUTROPHILS RELATIVE PERCENT: 88.7 %
NITRITE, URINE: NEGATIVE
O2 CONTENT, VEN: 19 ML/DL
O2 SAT, VEN: 87 %
O2 THERAPY: ABNORMAL
PCO2, VEN: 28.9 MMHG (ref 40–50)
PDW BLD-RTO: 14.5 % (ref 12.4–15.4)
PERFORMED ON: ABNORMAL
PH UA: 5.5 (ref 5–8)
PH VENOUS: 7.38 (ref 7.35–7.45)
PHOSPHORUS: 1.4 MG/DL (ref 2.5–4.9)
PHOSPHORUS: 1.6 MG/DL (ref 2.5–4.9)
PLATELET # BLD: 102 K/UL (ref 135–450)
PLATELET SLIDE REVIEW: ABNORMAL
PMV BLD AUTO: 10.9 FL (ref 5–10.5)
PO2, VEN: 52 MMHG
POTASSIUM REFLEX MAGNESIUM: 6.7 MMOL/L (ref 3.5–5.1)
POTASSIUM SERPL-SCNC: 3.9 MMOL/L (ref 3.5–5.1)
POTASSIUM SERPL-SCNC: 4.9 MMOL/L (ref 3.5–5.1)
PROTEIN UA: 100 MG/DL
RBC # BLD: 5.35 M/UL (ref 4.2–5.9)
RBC UA: 0 /HPF (ref 0–4)
SODIUM BLD-SCNC: 128 MMOL/L (ref 136–145)
SODIUM BLD-SCNC: 137 MMOL/L (ref 136–145)
SODIUM BLD-SCNC: 141 MMOL/L (ref 136–145)
SPECIFIC GRAVITY UA: 1.02 (ref 1–1.03)
TCO2 CALC VENOUS: 18 MMOL/L
TROPONIN: 0.04 NG/ML
TROPONIN: 0.04 NG/ML
URINE REFLEX TO CULTURE: ABNORMAL
URINE TYPE: ABNORMAL
UROBILINOGEN, URINE: 0.2 E.U./DL
WBC # BLD: 7.5 K/UL (ref 4–11)
WBC UA: 0 /HPF (ref 0–5)

## 2019-10-23 PROCEDURE — 93005 ELECTROCARDIOGRAM TRACING: CPT | Performed by: EMERGENCY MEDICINE

## 2019-10-23 PROCEDURE — 36415 COLL VENOUS BLD VENIPUNCTURE: CPT

## 2019-10-23 PROCEDURE — 2000000000 HC ICU R&B

## 2019-10-23 PROCEDURE — 6370000000 HC RX 637 (ALT 250 FOR IP): Performed by: HOSPITALIST

## 2019-10-23 PROCEDURE — 6370000000 HC RX 637 (ALT 250 FOR IP): Performed by: EMERGENCY MEDICINE

## 2019-10-23 PROCEDURE — 84100 ASSAY OF PHOSPHORUS: CPT

## 2019-10-23 PROCEDURE — 94760 N-INVAS EAR/PLS OXIMETRY 1: CPT

## 2019-10-23 PROCEDURE — 81001 URINALYSIS AUTO W/SCOPE: CPT

## 2019-10-23 PROCEDURE — 83735 ASSAY OF MAGNESIUM: CPT

## 2019-10-23 PROCEDURE — 2580000003 HC RX 258: Performed by: EMERGENCY MEDICINE

## 2019-10-23 PROCEDURE — 85025 COMPLETE CBC W/AUTO DIFF WBC: CPT

## 2019-10-23 PROCEDURE — 96375 TX/PRO/DX INJ NEW DRUG ADDON: CPT

## 2019-10-23 PROCEDURE — 83605 ASSAY OF LACTIC ACID: CPT

## 2019-10-23 PROCEDURE — 80048 BASIC METABOLIC PNL TOTAL CA: CPT

## 2019-10-23 PROCEDURE — 71045 X-RAY EXAM CHEST 1 VIEW: CPT

## 2019-10-23 PROCEDURE — 2580000003 HC RX 258: Performed by: HOSPITALIST

## 2019-10-23 PROCEDURE — 96376 TX/PRO/DX INJ SAME DRUG ADON: CPT

## 2019-10-23 PROCEDURE — 93010 ELECTROCARDIOGRAM REPORT: CPT | Performed by: INTERNAL MEDICINE

## 2019-10-23 PROCEDURE — 82803 BLOOD GASES ANY COMBINATION: CPT

## 2019-10-23 PROCEDURE — 96365 THER/PROPH/DIAG IV INF INIT: CPT

## 2019-10-23 PROCEDURE — 96361 HYDRATE IV INFUSION ADD-ON: CPT

## 2019-10-23 PROCEDURE — 84484 ASSAY OF TROPONIN QUANT: CPT

## 2019-10-23 PROCEDURE — 99285 EMERGENCY DEPT VISIT HI MDM: CPT

## 2019-10-23 PROCEDURE — 2500000003 HC RX 250 WO HCPCS: Performed by: HOSPITALIST

## 2019-10-23 PROCEDURE — 82010 KETONE BODYS QUAN: CPT

## 2019-10-23 PROCEDURE — 6360000002 HC RX W HCPCS: Performed by: HOSPITALIST

## 2019-10-23 PROCEDURE — 2500000003 HC RX 250 WO HCPCS: Performed by: EMERGENCY MEDICINE

## 2019-10-23 PROCEDURE — 6360000002 HC RX W HCPCS: Performed by: EMERGENCY MEDICINE

## 2019-10-23 RX ORDER — HYDRALAZINE HYDROCHLORIDE 50 MG/1
100 TABLET, FILM COATED ORAL 3 TIMES DAILY
Status: DISCONTINUED | OUTPATIENT
Start: 2019-10-23 | End: 2019-10-24 | Stop reason: HOSPADM

## 2019-10-23 RX ORDER — 0.9 % SODIUM CHLORIDE 0.9 %
2000 INTRAVENOUS SOLUTION INTRAVENOUS ONCE
Status: COMPLETED | OUTPATIENT
Start: 2019-10-23 | End: 2019-10-23

## 2019-10-23 RX ORDER — ASPIRIN 325 MG
325 TABLET ORAL ONCE
Status: COMPLETED | OUTPATIENT
Start: 2019-10-23 | End: 2019-10-23

## 2019-10-23 RX ORDER — M-VIT,TX,IRON,MINS/CALC/FOLIC 27MG-0.4MG
1 TABLET ORAL DAILY
Status: DISCONTINUED | OUTPATIENT
Start: 2019-10-24 | End: 2019-10-24 | Stop reason: HOSPADM

## 2019-10-23 RX ORDER — 0.9 % SODIUM CHLORIDE 0.9 %
15 INTRAVENOUS SOLUTION INTRAVENOUS ONCE
Status: DISCONTINUED | OUTPATIENT
Start: 2019-10-23 | End: 2019-10-24 | Stop reason: HOSPADM

## 2019-10-23 RX ORDER — ISOSORBIDE MONONITRATE 30 MG/1
30 TABLET, EXTENDED RELEASE ORAL ONCE
Status: COMPLETED | OUTPATIENT
Start: 2019-10-23 | End: 2019-10-23

## 2019-10-23 RX ORDER — MAGNESIUM SULFATE 1 G/100ML
1 INJECTION INTRAVENOUS PRN
Status: DISCONTINUED | OUTPATIENT
Start: 2019-10-23 | End: 2019-10-24 | Stop reason: HOSPADM

## 2019-10-23 RX ORDER — CLOPIDOGREL BISULFATE 75 MG/1
75 TABLET ORAL DAILY
Status: DISCONTINUED | OUTPATIENT
Start: 2019-10-24 | End: 2019-10-24 | Stop reason: HOSPADM

## 2019-10-23 RX ORDER — POTASSIUM CHLORIDE 7.45 MG/ML
10 INJECTION INTRAVENOUS PRN
Status: DISCONTINUED | OUTPATIENT
Start: 2019-10-23 | End: 2019-10-24 | Stop reason: HOSPADM

## 2019-10-23 RX ORDER — SODIUM CHLORIDE 450 MG/100ML
INJECTION, SOLUTION INTRAVENOUS CONTINUOUS
Status: DISCONTINUED | OUTPATIENT
Start: 2019-10-23 | End: 2019-10-24 | Stop reason: HOSPADM

## 2019-10-23 RX ORDER — ROSUVASTATIN CALCIUM 10 MG/1
40 TABLET, COATED ORAL EVERY EVENING
Status: DISCONTINUED | OUTPATIENT
Start: 2019-10-23 | End: 2019-10-24 | Stop reason: HOSPADM

## 2019-10-23 RX ORDER — OMEGA-3S/DHA/EPA/FISH OIL/D3 300MG-1000
400 CAPSULE ORAL 2 TIMES DAILY
COMMUNITY
End: 2020-09-16

## 2019-10-23 RX ORDER — CARVEDILOL 25 MG/1
25 TABLET ORAL 2 TIMES DAILY
Status: DISCONTINUED | OUTPATIENT
Start: 2019-10-23 | End: 2019-10-24 | Stop reason: HOSPADM

## 2019-10-23 RX ORDER — DEXTROSE MONOHYDRATE 25 G/50ML
12.5 INJECTION, SOLUTION INTRAVENOUS PRN
Status: DISCONTINUED | OUTPATIENT
Start: 2019-10-23 | End: 2019-10-24 | Stop reason: HOSPADM

## 2019-10-23 RX ORDER — DEXTROSE MONOHYDRATE 50 MG/ML
100 INJECTION, SOLUTION INTRAVENOUS PRN
Status: DISCONTINUED | OUTPATIENT
Start: 2019-10-23 | End: 2019-10-24 | Stop reason: HOSPADM

## 2019-10-23 RX ORDER — FENTANYL CITRATE 50 UG/ML
50 INJECTION, SOLUTION INTRAMUSCULAR; INTRAVENOUS ONCE
Status: COMPLETED | OUTPATIENT
Start: 2019-10-23 | End: 2019-10-23

## 2019-10-23 RX ORDER — NICOTINE POLACRILEX 4 MG
15 LOZENGE BUCCAL PRN
Status: DISCONTINUED | OUTPATIENT
Start: 2019-10-23 | End: 2019-10-24 | Stop reason: HOSPADM

## 2019-10-23 RX ORDER — DEXTROSE AND SODIUM CHLORIDE 5; .45 G/100ML; G/100ML
INJECTION, SOLUTION INTRAVENOUS CONTINUOUS PRN
Status: DISCONTINUED | OUTPATIENT
Start: 2019-10-23 | End: 2019-10-24 | Stop reason: HOSPADM

## 2019-10-23 RX ORDER — NITROGLYCERIN 0.4 MG/1
0.4 TABLET SUBLINGUAL EVERY 5 MIN PRN
Status: DISCONTINUED | OUTPATIENT
Start: 2019-10-23 | End: 2019-10-24 | Stop reason: HOSPADM

## 2019-10-23 RX ORDER — DILTIAZEM HYDROCHLORIDE 120 MG/1
120 CAPSULE, COATED, EXTENDED RELEASE ORAL DAILY
Status: DISCONTINUED | OUTPATIENT
Start: 2019-10-24 | End: 2019-10-24 | Stop reason: HOSPADM

## 2019-10-23 RX ORDER — DILTIAZEM HYDROCHLORIDE 120 MG/1
120 CAPSULE, COATED, EXTENDED RELEASE ORAL ONCE
Status: COMPLETED | OUTPATIENT
Start: 2019-10-23 | End: 2019-10-23

## 2019-10-23 RX ORDER — ASCORBIC ACID 500 MG
500 TABLET ORAL DAILY
COMMUNITY
End: 2021-03-01 | Stop reason: ALTCHOICE

## 2019-10-23 RX ORDER — CALCIUM GLUCONATE 94 MG/ML
1 INJECTION, SOLUTION INTRAVENOUS ONCE
Status: COMPLETED | OUTPATIENT
Start: 2019-10-23 | End: 2019-10-23

## 2019-10-23 RX ORDER — OXYCODONE HYDROCHLORIDE 5 MG/1
5 TABLET ORAL ONCE
Status: COMPLETED | OUTPATIENT
Start: 2019-10-23 | End: 2019-10-23

## 2019-10-23 RX ORDER — ASPIRIN 81 MG/1
81 TABLET, CHEWABLE ORAL DAILY
Status: DISCONTINUED | OUTPATIENT
Start: 2019-10-24 | End: 2019-10-24 | Stop reason: HOSPADM

## 2019-10-23 RX ORDER — ISOSORBIDE MONONITRATE 30 MG/1
30 TABLET, EXTENDED RELEASE ORAL DAILY
Status: DISCONTINUED | OUTPATIENT
Start: 2019-10-24 | End: 2019-10-24 | Stop reason: HOSPADM

## 2019-10-23 RX ORDER — SODIUM POLYSTYRENE SULFONATE 15 G/60ML
15 SUSPENSION ORAL; RECTAL ONCE
Status: COMPLETED | OUTPATIENT
Start: 2019-10-23 | End: 2019-10-23

## 2019-10-23 RX ORDER — ONDANSETRON 2 MG/ML
4 INJECTION INTRAMUSCULAR; INTRAVENOUS ONCE
Status: COMPLETED | OUTPATIENT
Start: 2019-10-23 | End: 2019-10-23

## 2019-10-23 RX ORDER — RANOLAZINE 500 MG/1
500 TABLET, EXTENDED RELEASE ORAL 2 TIMES DAILY
Status: DISCONTINUED | OUTPATIENT
Start: 2019-10-23 | End: 2019-10-24 | Stop reason: HOSPADM

## 2019-10-23 RX ADMIN — RANOLAZINE 500 MG: 500 TABLET, FILM COATED, EXTENDED RELEASE ORAL at 20:47

## 2019-10-23 RX ADMIN — DEXTROSE AND SODIUM CHLORIDE: 5; 450 INJECTION, SOLUTION INTRAVENOUS at 21:45

## 2019-10-23 RX ADMIN — SODIUM PHOSPHATE, MONOBASIC, MONOHYDRATE 15 MMOL: 276; 142 INJECTION, SOLUTION INTRAVENOUS at 21:18

## 2019-10-23 RX ADMIN — APIXABAN 5 MG: 5 TABLET, FILM COATED ORAL at 20:47

## 2019-10-23 RX ADMIN — ROSUVASTATIN CALCIUM 40 MG: 10 TABLET, FILM COATED ORAL at 20:47

## 2019-10-23 RX ADMIN — CALCIUM GLUCONATE 1 G: 94 INJECTION, SOLUTION INTRAVENOUS at 15:52

## 2019-10-23 RX ADMIN — SODIUM CHLORIDE: 4.5 INJECTION, SOLUTION INTRAVENOUS at 18:16

## 2019-10-23 RX ADMIN — HYDRALAZINE HYDROCHLORIDE 100 MG: 50 TABLET, FILM COATED ORAL at 20:47

## 2019-10-23 RX ADMIN — DILTIAZEM HYDROCHLORIDE 120 MG: 120 CAPSULE, COATED, EXTENDED RELEASE ORAL at 18:40

## 2019-10-23 RX ADMIN — ONDANSETRON 4 MG: 2 INJECTION INTRAMUSCULAR; INTRAVENOUS at 16:15

## 2019-10-23 RX ADMIN — ISOSORBIDE MONONITRATE 30 MG: 30 TABLET, EXTENDED RELEASE ORAL at 18:40

## 2019-10-23 RX ADMIN — SODIUM CHLORIDE 0.1 UNITS/KG/HR: 9 INJECTION, SOLUTION INTRAVENOUS at 16:34

## 2019-10-23 RX ADMIN — ASPIRIN 325 MG ORAL TABLET 325 MG: 325 PILL ORAL at 16:39

## 2019-10-23 RX ADMIN — CARVEDILOL 25 MG: 25 TABLET, FILM COATED ORAL at 20:47

## 2019-10-23 RX ADMIN — OXYCODONE HYDROCHLORIDE 5 MG: 5 TABLET ORAL at 16:39

## 2019-10-23 RX ADMIN — INSULIN HUMAN 10 UNITS: 100 INJECTION, SOLUTION PARENTERAL at 14:08

## 2019-10-23 RX ADMIN — SODIUM CHLORIDE 2000 ML: 9 INJECTION, SOLUTION INTRAVENOUS at 14:18

## 2019-10-23 RX ADMIN — Medication 50 MEQ: at 15:48

## 2019-10-23 RX ADMIN — POTASSIUM CHLORIDE 10 MEQ: 10 INJECTION, SOLUTION INTRAVENOUS at 23:06

## 2019-10-23 RX ADMIN — SODIUM POLYSTYRENE SULFONATE 15 G: 15 SUSPENSION ORAL; RECTAL at 17:36

## 2019-10-23 RX ADMIN — FENTANYL CITRATE 50 MCG: 50 INJECTION, SOLUTION INTRAMUSCULAR; INTRAVENOUS at 14:10

## 2019-10-23 ASSESSMENT — PAIN DESCRIPTION - LOCATION
LOCATION: CHEST

## 2019-10-23 ASSESSMENT — PAIN DESCRIPTION - PROGRESSION
CLINICAL_PROGRESSION: NOT CHANGED

## 2019-10-23 ASSESSMENT — PAIN DESCRIPTION - ORIENTATION
ORIENTATION: RIGHT;MID
ORIENTATION: LEFT
ORIENTATION: LEFT

## 2019-10-23 ASSESSMENT — PAIN DESCRIPTION - ONSET
ONSET: ON-GOING

## 2019-10-23 ASSESSMENT — PAIN - FUNCTIONAL ASSESSMENT
PAIN_FUNCTIONAL_ASSESSMENT: PREVENTS OR INTERFERES WITH MANY ACTIVE NOT PASSIVE ACTIVITIES
PAIN_FUNCTIONAL_ASSESSMENT: ACTIVITIES ARE NOT PREVENTED
PAIN_FUNCTIONAL_ASSESSMENT: ACTIVITIES ARE NOT PREVENTED

## 2019-10-23 ASSESSMENT — PAIN DESCRIPTION - PAIN TYPE
TYPE: ACUTE PAIN

## 2019-10-23 ASSESSMENT — ENCOUNTER SYMPTOMS
GASTROINTESTINAL NEGATIVE: 1
NAUSEA: 0
ABDOMINAL PAIN: 0
SHORTNESS OF BREATH: 0
EYES NEGATIVE: 1
COUGH: 0
VOMITING: 0
RESPIRATORY NEGATIVE: 1

## 2019-10-23 ASSESSMENT — PAIN DESCRIPTION - DESCRIPTORS
DESCRIPTORS: ACHING
DESCRIPTORS: SHARP
DESCRIPTORS: CRAMPING

## 2019-10-23 ASSESSMENT — PAIN SCALES - GENERAL
PAINLEVEL_OUTOF10: 0
PAINLEVEL_OUTOF10: 9
PAINLEVEL_OUTOF10: 0
PAINLEVEL_OUTOF10: 5
PAINLEVEL_OUTOF10: 9
PAINLEVEL_OUTOF10: 2
PAINLEVEL_OUTOF10: 5

## 2019-10-23 ASSESSMENT — PAIN DESCRIPTION - FREQUENCY
FREQUENCY: INTERMITTENT
FREQUENCY: CONTINUOUS
FREQUENCY: INTERMITTENT

## 2019-10-24 ENCOUNTER — TELEPHONE (OUTPATIENT)
Dept: ENDOCRINOLOGY | Age: 54
End: 2019-10-24

## 2019-10-24 VITALS
BODY MASS INDEX: 30 KG/M2 | HEIGHT: 68 IN | DIASTOLIC BLOOD PRESSURE: 53 MMHG | TEMPERATURE: 98.6 F | RESPIRATION RATE: 17 BRPM | OXYGEN SATURATION: 99 % | WEIGHT: 197.97 LBS | SYSTOLIC BLOOD PRESSURE: 143 MMHG | HEART RATE: 61 BPM

## 2019-10-24 LAB
ANION GAP SERPL CALCULATED.3IONS-SCNC: 12 MMOL/L (ref 3–16)
ANION GAP SERPL CALCULATED.3IONS-SCNC: 14 MMOL/L (ref 3–16)
ANION GAP SERPL CALCULATED.3IONS-SCNC: 15 MMOL/L (ref 3–16)
ANION GAP SERPL CALCULATED.3IONS-SCNC: 16 MMOL/L (ref 3–16)
BUN BLDV-MCNC: 56 MG/DL (ref 7–20)
BUN BLDV-MCNC: 57 MG/DL (ref 7–20)
BUN BLDV-MCNC: 60 MG/DL (ref 7–20)
BUN BLDV-MCNC: 63 MG/DL (ref 7–20)
CALCIUM SERPL-MCNC: 8.2 MG/DL (ref 8.3–10.6)
CALCIUM SERPL-MCNC: 8.3 MG/DL (ref 8.3–10.6)
CALCIUM SERPL-MCNC: 8.4 MG/DL (ref 8.3–10.6)
CALCIUM SERPL-MCNC: 8.5 MG/DL (ref 8.3–10.6)
CHLORIDE BLD-SCNC: 105 MMOL/L (ref 99–110)
CO2: 19 MMOL/L (ref 21–32)
CO2: 21 MMOL/L (ref 21–32)
CO2: 21 MMOL/L (ref 21–32)
CO2: 24 MMOL/L (ref 21–32)
CREAT SERPL-MCNC: 4.4 MG/DL (ref 0.9–1.3)
CREAT SERPL-MCNC: 4.4 MG/DL (ref 0.9–1.3)
CREAT SERPL-MCNC: 4.6 MG/DL (ref 0.9–1.3)
CREAT SERPL-MCNC: 4.7 MG/DL (ref 0.9–1.3)
EKG ATRIAL RATE: 78 BPM
EKG DIAGNOSIS: NORMAL
EKG P AXIS: 63 DEGREES
EKG P-R INTERVAL: 146 MS
EKG Q-T INTERVAL: 412 MS
EKG QRS DURATION: 100 MS
EKG QTC CALCULATION (BAZETT): 469 MS
EKG R AXIS: 70 DEGREES
EKG T AXIS: 70 DEGREES
EKG VENTRICULAR RATE: 78 BPM
GFR AFRICAN AMERICAN: 16
GFR AFRICAN AMERICAN: 16
GFR AFRICAN AMERICAN: 17
GFR AFRICAN AMERICAN: 17
GFR NON-AFRICAN AMERICAN: 13
GFR NON-AFRICAN AMERICAN: 13
GFR NON-AFRICAN AMERICAN: 14
GFR NON-AFRICAN AMERICAN: 14
GLUCOSE BLD-MCNC: 102 MG/DL (ref 70–99)
GLUCOSE BLD-MCNC: 122 MG/DL (ref 70–99)
GLUCOSE BLD-MCNC: 130 MG/DL (ref 70–99)
GLUCOSE BLD-MCNC: 139 MG/DL (ref 70–99)
GLUCOSE BLD-MCNC: 141 MG/DL (ref 70–99)
GLUCOSE BLD-MCNC: 153 MG/DL (ref 70–99)
GLUCOSE BLD-MCNC: 153 MG/DL (ref 70–99)
GLUCOSE BLD-MCNC: 154 MG/DL (ref 70–99)
GLUCOSE BLD-MCNC: 154 MG/DL (ref 70–99)
GLUCOSE BLD-MCNC: 156 MG/DL (ref 70–99)
GLUCOSE BLD-MCNC: 160 MG/DL (ref 70–99)
GLUCOSE BLD-MCNC: 160 MG/DL (ref 70–99)
GLUCOSE BLD-MCNC: 161 MG/DL (ref 70–99)
GLUCOSE BLD-MCNC: 164 MG/DL (ref 70–99)
GLUCOSE BLD-MCNC: 165 MG/DL (ref 70–99)
GLUCOSE BLD-MCNC: 179 MG/DL (ref 70–99)
GLUCOSE BLD-MCNC: 185 MG/DL (ref 70–99)
GLUCOSE BLD-MCNC: 190 MG/DL (ref 70–99)
GLUCOSE BLD-MCNC: 203 MG/DL (ref 70–99)
GLUCOSE BLD-MCNC: 217 MG/DL (ref 70–99)
GLUCOSE BLD-MCNC: 229 MG/DL (ref 70–99)
GLUCOSE BLD-MCNC: 85 MG/DL (ref 70–99)
GLUCOSE BLD-MCNC: 99 MG/DL (ref 70–99)
LACTIC ACID: 1.9 MMOL/L (ref 0.4–2)
MAGNESIUM: 2 MG/DL (ref 1.8–2.4)
MAGNESIUM: 2.1 MG/DL (ref 1.8–2.4)
MAGNESIUM: 2.1 MG/DL (ref 1.8–2.4)
MAGNESIUM: 2.3 MG/DL (ref 1.8–2.4)
PERFORMED ON: ABNORMAL
PERFORMED ON: NORMAL
PERFORMED ON: NORMAL
PHOSPHORUS: 2.4 MG/DL (ref 2.5–4.9)
PHOSPHORUS: 2.9 MG/DL (ref 2.5–4.9)
PHOSPHORUS: 3.1 MG/DL (ref 2.5–4.9)
PHOSPHORUS: 3.6 MG/DL (ref 2.5–4.9)
POTASSIUM SERPL-SCNC: 3.8 MMOL/L (ref 3.5–5.1)
POTASSIUM SERPL-SCNC: 4.1 MMOL/L (ref 3.5–5.1)
POTASSIUM SERPL-SCNC: 4.1 MMOL/L (ref 3.5–5.1)
POTASSIUM SERPL-SCNC: 4.2 MMOL/L (ref 3.5–5.1)
SODIUM BLD-SCNC: 140 MMOL/L (ref 136–145)
SODIUM BLD-SCNC: 140 MMOL/L (ref 136–145)
SODIUM BLD-SCNC: 141 MMOL/L (ref 136–145)
SODIUM BLD-SCNC: 141 MMOL/L (ref 136–145)
TROPONIN: 0.54 NG/ML

## 2019-10-24 PROCEDURE — 80048 BASIC METABOLIC PNL TOTAL CA: CPT

## 2019-10-24 PROCEDURE — 94760 N-INVAS EAR/PLS OXIMETRY 1: CPT

## 2019-10-24 PROCEDURE — 36415 COLL VENOUS BLD VENIPUNCTURE: CPT

## 2019-10-24 PROCEDURE — 93010 ELECTROCARDIOGRAM REPORT: CPT | Performed by: INTERNAL MEDICINE

## 2019-10-24 PROCEDURE — 83605 ASSAY OF LACTIC ACID: CPT

## 2019-10-24 PROCEDURE — 83735 ASSAY OF MAGNESIUM: CPT

## 2019-10-24 PROCEDURE — 6360000002 HC RX W HCPCS: Performed by: HOSPITALIST

## 2019-10-24 PROCEDURE — 2500000003 HC RX 250 WO HCPCS: Performed by: HOSPITALIST

## 2019-10-24 PROCEDURE — 84484 ASSAY OF TROPONIN QUANT: CPT

## 2019-10-24 PROCEDURE — 84100 ASSAY OF PHOSPHORUS: CPT

## 2019-10-24 PROCEDURE — 6370000000 HC RX 637 (ALT 250 FOR IP): Performed by: HOSPITALIST

## 2019-10-24 PROCEDURE — 2580000003 HC RX 258: Performed by: HOSPITALIST

## 2019-10-24 RX ORDER — SUBCUTANEOUS INSULIN PUMP
EACH MISCELLANEOUS CONTINUOUS
Status: DISCONTINUED | OUTPATIENT
Start: 2019-10-24 | End: 2019-10-24 | Stop reason: HOSPADM

## 2019-10-24 RX ADMIN — DEXTROSE AND SODIUM CHLORIDE: 5; 450 INJECTION, SOLUTION INTRAVENOUS at 04:04

## 2019-10-24 RX ADMIN — DILTIAZEM HYDROCHLORIDE 120 MG: 120 CAPSULE, COATED, EXTENDED RELEASE ORAL at 08:02

## 2019-10-24 RX ADMIN — Medication: at 17:48

## 2019-10-24 RX ADMIN — ISOSORBIDE MONONITRATE 30 MG: 30 TABLET, EXTENDED RELEASE ORAL at 08:03

## 2019-10-24 RX ADMIN — DEXTROSE AND SODIUM CHLORIDE: 5; 450 INJECTION, SOLUTION INTRAVENOUS at 11:12

## 2019-10-24 RX ADMIN — APIXABAN 5 MG: 5 TABLET, FILM COATED ORAL at 08:03

## 2019-10-24 RX ADMIN — POTASSIUM CHLORIDE 10 MEQ: 10 INJECTION, SOLUTION INTRAVENOUS at 08:03

## 2019-10-24 RX ADMIN — RANOLAZINE 500 MG: 500 TABLET, FILM COATED, EXTENDED RELEASE ORAL at 08:03

## 2019-10-24 RX ADMIN — CARVEDILOL 25 MG: 25 TABLET, FILM COATED ORAL at 08:02

## 2019-10-24 RX ADMIN — HYDRALAZINE HYDROCHLORIDE 100 MG: 50 TABLET, FILM COATED ORAL at 08:03

## 2019-10-24 RX ADMIN — POTASSIUM CHLORIDE 10 MEQ: 10 INJECTION, SOLUTION INTRAVENOUS at 03:39

## 2019-10-24 RX ADMIN — POTASSIUM CHLORIDE 10 MEQ: 10 INJECTION, SOLUTION INTRAVENOUS at 00:08

## 2019-10-24 RX ADMIN — SODIUM PHOSPHATE, MONOBASIC, MONOHYDRATE 10 MMOL: 276; 142 INJECTION, SOLUTION INTRAVENOUS at 04:32

## 2019-10-24 RX ADMIN — POTASSIUM CHLORIDE 10 MEQ: 10 INJECTION, SOLUTION INTRAVENOUS at 02:50

## 2019-10-24 RX ADMIN — ASPIRIN 81 MG 81 MG: 81 TABLET ORAL at 08:03

## 2019-10-24 RX ADMIN — INSULIN ASPART 300 UNITS: 100 INJECTION, SOLUTION INTRAVENOUS; SUBCUTANEOUS at 17:14

## 2019-10-24 RX ADMIN — POTASSIUM CHLORIDE 10 MEQ: 10 INJECTION, SOLUTION INTRAVENOUS at 01:05

## 2019-10-24 RX ADMIN — POTASSIUM CHLORIDE 10 MEQ: 10 INJECTION, SOLUTION INTRAVENOUS at 15:35

## 2019-10-24 RX ADMIN — MULTIPLE VITAMINS W/ MINERALS TAB 1 TABLET: TAB at 08:03

## 2019-10-24 RX ADMIN — HYDRALAZINE HYDROCHLORIDE 100 MG: 50 TABLET, FILM COATED ORAL at 15:35

## 2019-10-24 RX ADMIN — POTASSIUM CHLORIDE 10 MEQ: 10 INJECTION, SOLUTION INTRAVENOUS at 04:42

## 2019-10-24 RX ADMIN — CLOPIDOGREL BISULFATE 75 MG: 75 TABLET ORAL at 08:03

## 2019-10-24 ASSESSMENT — PAIN SCALES - GENERAL
PAINLEVEL_OUTOF10: 0

## 2019-10-31 RX ORDER — CARVEDILOL 25 MG/1
TABLET ORAL
Qty: 90 TABLET | Refills: 2 | Status: SHIPPED | OUTPATIENT
Start: 2019-10-31 | End: 2020-02-03

## 2019-12-09 ENCOUNTER — TELEPHONE (OUTPATIENT)
Dept: CARDIOLOGY CLINIC | Age: 54
End: 2019-12-09

## 2019-12-09 ENCOUNTER — OFFICE VISIT (OUTPATIENT)
Dept: ENDOCRINOLOGY | Age: 54
End: 2019-12-09
Payer: COMMERCIAL

## 2019-12-09 VITALS
DIASTOLIC BLOOD PRESSURE: 75 MMHG | WEIGHT: 207 LBS | BODY MASS INDEX: 31.37 KG/M2 | SYSTOLIC BLOOD PRESSURE: 146 MMHG | HEART RATE: 75 BPM | HEIGHT: 68 IN

## 2019-12-09 DIAGNOSIS — I10 ESSENTIAL HYPERTENSION: ICD-10-CM

## 2019-12-09 DIAGNOSIS — N18.4 TYPE 1 DIABETES MELLITUS WITH STAGE 4 CHRONIC KIDNEY DISEASE (HCC): Primary | Chronic | ICD-10-CM

## 2019-12-09 DIAGNOSIS — Z46.81 INSULIN PUMP TITRATION: ICD-10-CM

## 2019-12-09 DIAGNOSIS — E10.22 TYPE 1 DIABETES MELLITUS WITH STAGE 4 CHRONIC KIDNEY DISEASE (HCC): Primary | Chronic | ICD-10-CM

## 2019-12-09 LAB — HBA1C MFR BLD: 10.3 %

## 2019-12-09 PROCEDURE — 99214 OFFICE O/P EST MOD 30 MIN: CPT | Performed by: INTERNAL MEDICINE

## 2019-12-09 PROCEDURE — 83036 HEMOGLOBIN GLYCOSYLATED A1C: CPT | Performed by: INTERNAL MEDICINE

## 2019-12-10 ENCOUNTER — TELEPHONE (OUTPATIENT)
Dept: ENDOCRINOLOGY | Age: 54
End: 2019-12-10

## 2019-12-10 RX ORDER — LANCETS 28 GAUGE
EACH MISCELLANEOUS
Qty: 200 EACH | Refills: 3 | Status: SHIPPED | OUTPATIENT
Start: 2019-12-10 | End: 2021-03-01 | Stop reason: ALTCHOICE

## 2019-12-26 RX ORDER — HYDRALAZINE HYDROCHLORIDE 100 MG/1
100 TABLET, FILM COATED ORAL 3 TIMES DAILY
Qty: 90 TABLET | Refills: 0 | Status: SHIPPED | OUTPATIENT
Start: 2019-12-26 | End: 2020-04-07

## 2019-12-26 RX ORDER — DILTIAZEM HYDROCHLORIDE 120 MG/1
120 CAPSULE, COATED, EXTENDED RELEASE ORAL DAILY
Qty: 30 CAPSULE | Refills: 0 | Status: SHIPPED | OUTPATIENT
Start: 2019-12-26 | End: 2020-01-27

## 2019-12-26 RX ORDER — ISOSORBIDE MONONITRATE 30 MG/1
30 TABLET, EXTENDED RELEASE ORAL DAILY
Qty: 30 TABLET | Refills: 0 | Status: SHIPPED | OUTPATIENT
Start: 2019-12-26 | End: 2020-04-29

## 2019-12-30 ENCOUNTER — OFFICE VISIT (OUTPATIENT)
Dept: CARDIOLOGY CLINIC | Age: 54
End: 2019-12-30
Payer: COMMERCIAL

## 2019-12-30 VITALS
HEIGHT: 68 IN | SYSTOLIC BLOOD PRESSURE: 136 MMHG | BODY MASS INDEX: 31.25 KG/M2 | DIASTOLIC BLOOD PRESSURE: 62 MMHG | HEART RATE: 76 BPM | WEIGHT: 206.2 LBS

## 2019-12-30 DIAGNOSIS — I25.10 CORONARY ARTERY DISEASE DUE TO LIPID RICH PLAQUE: Primary | ICD-10-CM

## 2019-12-30 DIAGNOSIS — I48.0 PAROXYSMAL ATRIAL FIBRILLATION (HCC): ICD-10-CM

## 2019-12-30 DIAGNOSIS — I25.83 CORONARY ARTERY DISEASE DUE TO LIPID RICH PLAQUE: Primary | ICD-10-CM

## 2019-12-30 DIAGNOSIS — I10 HTN (HYPERTENSION), BENIGN: ICD-10-CM

## 2019-12-30 DIAGNOSIS — E78.2 MIXED HYPERLIPIDEMIA: ICD-10-CM

## 2019-12-30 PROCEDURE — 99214 OFFICE O/P EST MOD 30 MIN: CPT | Performed by: NURSE PRACTITIONER

## 2019-12-30 RX ORDER — RANOLAZINE 500 MG/1
500 TABLET, EXTENDED RELEASE ORAL 2 TIMES DAILY
Qty: 180 TABLET | Refills: 3 | Status: SHIPPED | OUTPATIENT
Start: 2019-12-30 | End: 2020-02-28

## 2020-01-27 RX ORDER — FUROSEMIDE 40 MG/1
TABLET ORAL
Qty: 90 TABLET | Refills: 2 | Status: ON HOLD
Start: 2020-01-27 | End: 2020-05-04 | Stop reason: HOSPADM

## 2020-01-27 RX ORDER — DILTIAZEM HYDROCHLORIDE 120 MG/1
CAPSULE, COATED, EXTENDED RELEASE ORAL
Qty: 90 CAPSULE | Refills: 2 | Status: ON HOLD
Start: 2020-01-27 | End: 2020-05-04 | Stop reason: HOSPADM

## 2020-01-27 NOTE — TELEPHONE ENCOUNTER
RX APPROVAL:      Refill:   Requested Prescriptions     Pending Prescriptions Disp Refills    furosemide (LASIX) 40 MG tablet [Pharmacy Med Name: FUROSEMIDE 40 MG TABLET] 90 tablet 2     Sig: TAKE ONE TABLET BY MOUTH DAILY    diltiazem (CARDIZEM CD) 120 MG extended release capsule [Pharmacy Med Name: DILTIAZEM 24H ER(CD) 120 MG CP] 30 capsule 0     Sig: TAKE ONE CAPSULE BY MOUTH DAILY      Last OV: 12/30/19  Last EKG:   Last Labs:  Lab Results   Component Value Date    GLUCOSE 154 10/24/2019    BUN 56 10/24/2019    CREATININE 4.7 10/24/2019    LABGLOM 13 10/24/2019     10/24/2019    K 3.8 10/24/2019    K 6.7 10/23/2019     10/24/2019    CO2 21 10/24/2019    CALCIUM 8.3 10/24/2019     Lab Results   Component Value Date     10/24/2019     10/24/2019    CO2 21 10/24/2019    ANIONGAP 14 10/24/2019    GLUCOSE 154 10/24/2019    BUN 56 10/24/2019    CREATININE 4.7 10/24/2019    LABGLOM 13 10/24/2019    GFRAA 16 10/24/2019    GFRAA 44 06/15/2013    CALCIUM 8.3 10/24/2019    PROT 7.4 07/25/2019    PROT 7.9 09/28/2010    LABALBU 3.4 07/31/2019    BILITOT 0.3 07/25/2019    ALKPHOS 137 07/25/2019    AST 15 07/25/2019    ALT 14 07/25/2019    GLOB 3.8 07/25/2019     Lab Results   Component Value Date    ALT 14 07/25/2019    AST 15 07/25/2019     Lab Results   Component Value Date    K 3.8 10/24/2019    K 6.7 10/23/2019       Plan and labs reviewed

## 2020-02-03 RX ORDER — APIXABAN 5 MG/1
TABLET, FILM COATED ORAL
Qty: 60 TABLET | Refills: 0 | Status: SHIPPED | OUTPATIENT
Start: 2020-02-03 | End: 2020-03-12

## 2020-02-03 RX ORDER — CARVEDILOL 25 MG/1
TABLET ORAL
Qty: 90 TABLET | Refills: 1 | Status: SHIPPED | OUTPATIENT
Start: 2020-02-03 | End: 2020-04-02

## 2020-02-11 ENCOUNTER — TELEPHONE (OUTPATIENT)
Dept: ENDOCRINOLOGY | Age: 55
End: 2020-02-11

## 2020-02-12 NOTE — TELEPHONE ENCOUNTER
Pharmacy called back regarding the Lantus prescription. The insurance company prefers basalgar or levemir.     Please return call

## 2020-02-26 ENCOUNTER — TELEPHONE (OUTPATIENT)
Dept: CARDIOLOGY CLINIC | Age: 55
End: 2020-02-26

## 2020-02-26 NOTE — TELEPHONE ENCOUNTER
Pt started having chest pain and extremely winded with exertion. The more intensity the pain. Pt Sharp pain in center of chest with exertion. Pt is out of town till [employer]. Pt has an appt with NPTS on Friday. Pt wants to know what to do.

## 2020-02-28 ENCOUNTER — OFFICE VISIT (OUTPATIENT)
Dept: CARDIOLOGY CLINIC | Age: 55
End: 2020-02-28
Payer: COMMERCIAL

## 2020-02-28 VITALS
BODY MASS INDEX: 30.92 KG/M2 | WEIGHT: 204 LBS | HEART RATE: 78 BPM | SYSTOLIC BLOOD PRESSURE: 154 MMHG | OXYGEN SATURATION: 99 % | HEIGHT: 68 IN | DIASTOLIC BLOOD PRESSURE: 78 MMHG

## 2020-02-28 PROCEDURE — 99214 OFFICE O/P EST MOD 30 MIN: CPT | Performed by: NURSE PRACTITIONER

## 2020-02-28 RX ORDER — ROSUVASTATIN CALCIUM 40 MG/1
TABLET, COATED ORAL
Qty: 90 TABLET | Refills: 3 | Status: SHIPPED | OUTPATIENT
Start: 2020-02-28 | End: 2020-05-11 | Stop reason: SDUPTHER

## 2020-02-28 RX ORDER — NITROGLYCERIN 0.4 MG/1
0.4 TABLET SUBLINGUAL EVERY 5 MIN PRN
Qty: 25 TABLET | Refills: 3 | Status: SHIPPED | OUTPATIENT
Start: 2020-02-28 | End: 2020-07-29

## 2020-02-28 RX ORDER — RANOLAZINE 1000 MG/1
1000 TABLET, EXTENDED RELEASE ORAL 2 TIMES DAILY
Qty: 180 TABLET | Refills: 3 | Status: SHIPPED | OUTPATIENT
Start: 2020-02-28 | End: 2020-06-11

## 2020-02-28 NOTE — PROGRESS NOTES
Diabetic peripheral neuropathy (HCC)     Diastolic HF (heart failure) ([facility])     Hyperlipidemia with target LDL less than 70     Hypertension goal BP (blood pressure) < 130/80     PVD (peripheral vascular disease) ([facility])     Seizures ([facility])     Type 1 diabetes mellitus with chronic kidney disease ([facility])     c-peptide <0.1 in 2015     Social History:    Social History     Tobacco Use   Smoking Status Never Smoker   Smokeless Tobacco Never Used     Current Medications:  Current Outpatient Medications   Medication Sig Dispense Refill    insulin glargine (BASAGLAR KWIKPEN) 100 UNIT/ML injection pen Inject 20 Units into the skin nightly 20 units daily when off pump 5 pen 0    ELIQUIS 5 MG TABS tablet TAKE ONE TABLET BY MOUTH TWICE A DAY 60 tablet 0    carvedilol (COREG) 25 MG tablet TAKE TWO TABLETS BY MOUTH EVERY MORNING AND TAKE ONE TABLET BY MOUTH EVERY EVENING 90 tablet 1    furosemide (LASIX) 40 MG tablet TAKE ONE TABLET BY MOUTH DAILY 90 tablet 2    diltiazem (CARDIZEM CD) 120 MG extended release capsule TAKE ONE CAPSULE BY MOUTH DAILY 90 capsule 2    ranolazine (RANEXA) 500 MG extended release tablet Take 1 tablet by mouth 2 times daily 180 tablet 3    isosorbide mononitrate (IMDUR) 30 MG extended release tablet Take 1 tablet by mouth daily 30 tablet 0    hydrALAZINE (APRESOLINE) 100 MG tablet Take 1 tablet by mouth 3 times daily 90 tablet 0    glucagon (GLUCAGON EMERGENCY) 1 MG injection 1mg Im as needed 1 mg 3    blood glucose test strips (ACCU-CHEK AIMEE) strip 6 times daily.  (Patient not taking: Reported on 12/30/2019) 200 strip 6    Aimsco Ultra Thin Lancets MISC 6 times a day (Patient not taking: Reported on 12/30/2019) 200 each 3    Blood Glucose Monitoring Suppl ([facility]) BEHZAD As needed (Patient not taking: Reported on 12/30/2019) 1 Device 0    SOFT TOUCH LANCETS MISC 6 times a day (Patient not taking: Reported on 12/30/2019) 200 each 1    insulin aspart (NOVOLOG) 100 UNIT/ML injection vial 20-30 units daily via pump (Patient taking differently: 50-60 units daily via pump) 2 vial 3    blood glucose test strips (ONETOUCH VERIO) strip 1 each by In Vitro route daily 6-7 times a day As needed.  (Patient not taking: Reported on 12/30/2019) 200 each 3    vitamin D3 (CHOLECALCIFEROL) 10 MCG (400 UNIT) TABS tablet Take 400 Units by mouth 2 times daily      vitamin C (ASCORBIC ACID) 500 MG tablet Take 500 mg by mouth daily      Methylcobalamin (B-12) 1000 MCG TBDP Place 1,000 mcg under the tongue daily      clopidogrel (PLAVIX) 75 MG tablet TAKE ONE TABLET BY MOUTH DAILY (Patient not taking: Reported on 12/30/2019) 30 tablet 0    nitroGLYCERIN (NITROSTAT) 0.4 MG SL tablet Place 1 tablet under the tongue every 5 minutes as needed for Chest pain 25 tablet 3    rosuvastatin (CRESTOR) 40 MG tablet TAKE ONE TABLET BY MOUTH EVERY EVENING 90 tablet 3    Continuous Blood Gluc  (FREESTYLE SAL READER) BEHZAD As needed (Patient not taking: Reported on 12/30/2019) 1 Device 0    Continuous Blood Gluc Sensor (FREESTYLE SAL SENSOR SYSTEM) MISC Every 10 days (Patient not taking: Reported on 12/30/2019) 3 each 2    Elastic Bandages & Supports (MEDICAL COMPRESSION STOCKINGS) MISC 15-20 mmHg compression, wear daily during the day (Patient not taking: Reported on 12/30/2019) 1 each 1    Blood Glucose Monitoring Suppl (EASY TOUCH GLUCOSE SYSTEM) w/Device KIT Use four times a day and as needed (Patient not taking: Reported on 12/30/2019) 1 kit 0    EASY TOUCH LANCETS 32G MISC Use 3 times daily with meals and at bed time (Patient not taking: Reported on 12/30/2019) 100 each 6    glucose blood VI test strips (EASY TOUCH TEST) strip 1 each by In Vitro route 4 times daily (Patient not taking: Reported on 12/30/2019) 100 each 6    sildenafil (VIAGRA) 100 MG tablet Take 1 tablet by mouth as needed for Erectile Dysfunction (Patient not taking: Reported on 12/30/2019) 8 tablet 5    aspirin 81 MG tablet Take 1 tablet by mouth daily 90 tablet 3    Multiple Vitamins-Minerals (THERAPEUTIC MULTIVITAMIN-MINERALS) tablet Take 1 tablet by mouth daily. No current facility-administered medications for this visit. REVIEW OF SYSTEMS:    CONSTITUTIONAL: No major weight gain or loss, fatigue, weakness, night sweats or fever. HEENT: No new vision difficulties or ringing in the ears. RESPIRATORY: No new SOB, PND, orthopnea or cough. CARDIOVASCULAR: See HPI  GI: No nausea, vomiting, diarrhea, constipation, abdominal pain or changes in bowel habits. : No urinary frequency, urgency, incontinence hematuria or dysuria. SKIN: No cyanosis or skin lesions. MUSCULOSKELETAL: No new muscle or joint pain. NEUROLOGICAL: No syncope or TIA-like symptoms. PSYCHIATRIC: No anxiety, pain, insomnia or depression    Objective:   PHYSICAL EXAM:       Vitals:    02/28/20 1519 02/28/20 1551   BP: 138/80 (!) 154/78   Site: Right Upper Arm    Position: Sitting    Cuff Size: Large Adult    Pulse: 78    SpO2: 99%    Weight: 204 lb (92.5 kg)    Height: 5' 7.5\" (1.715 m)         VITALS:  /80 (Site: Right Upper Arm, Position: Sitting, Cuff Size: Large Adult)   Pulse 78   Ht 5' 7.5\" (1.715 m)   Wt 204 lb (92.5 kg)   SpO2 99%   BMI 31.48 kg/m²   CONSTITUTIONAL: Cooperative, no apparent distress, and appears well nourished / developed  NEUROLOGIC:  Awake and orientated to person, place and time. PSYCH: Calm affect. SKIN: Warm and dry. HEENT: Sclera non-icteric, normocephalic, neck supple, no elevation of JVP, normal carotid pulses with no bruits and thyroid normal size. LUNGS:  No increased work of breathing and clear to auscultation, no crackles or wheezing  CARDIOVASCULAR:  Regular rate 76 and rhythm with no murmurs, gallops, rubs, or abnormal heart sounds, normal PMI. The apical impulses not displaced  JVP less than 8 cm H2O  Heart tones are crisp and normal  Cervical veins are not engorged  The carotid upstroke is normal Echo: 7/25/19   Summary   LV systolic function is moderately reduced with an EF of 35-40 %. There is more prominent HK of the anteroseptal wall. Left ventricular cavity size is mildly dilated. There is mild hypertrophy of the septum. Grade I diastolic dysfunction with normal left ventricular filling pressure. Trivial aortic regurgitation. Inadequate tricuspid regurgitation jet to estimate systolic artery pressure   (SPAP). Last Angiogram: July '19:  patent grafts bypassing the LAD and Cx occlusions.  Also showed diffuse RCA disease, with about 90% distal stenosis, where the artery was only about 1mm wide (unchanged from 2017).    The plan is for medical management - clopidogrel, statin, carvedilol, ISMN, ranolazine  - discussed not to overlap SL nitro and sildenafil    Myoview stress: April '17  Summary    Technically a satisfactory study.    Non-diagnostic pharmacological stress portion of the study due to resting ST    segment depression.    Moderate sized reversible defect distal inferolateral wall involving LCX or    dominant RCA territory.    Gated study shows estimated EF of 47 . Assessment:      Diagnosis Orders   1. Coronary artery disease due to lipid rich plaque   ~episode of angina ~ one week ago new since starting Ranexa  ~imdur,  carvedilol, crestor  ~s/p CABG '01  ~multiple stents with [facility]    2. Mixed hyperlipidemia   ~LDL on profile from July'19  ~had not gotten labs as recommended  ~crestor 40 mg daily         3. HTN (hypertension), benign   ~controlled on arrival ; suboptimal with recheck    4. Paroxysmal atrial fibrillation (HCC)   ~stable : offers no c/o palpitations  ~AP regular  ~DOAC / diltiazem  ~CHADsVASc 4    ~two self limiting episodes during hosp in July  ~Event monitor recommended : ? Results  ~LA 3.4 cm ; TSH WNL        I had the opportunity to review the clinical symptoms and presentation of [address]:     1. Increase Ranexa 1000 mg bid  2.  F/U in 4-6 weeks    Referral to cardiac rehab [facility]    Overall the patient is stable from CV standpoint    I have addresed the patient's cardiac risk factors and adjusted pharmacologic treatment as needed. In addition, I have reinforced the need for patient directed risk factor modification. Further evaluation will be based upon the patient's clinical course and testing results. All questions and concerns were addressed to the patient. Alternatives to my treatment were discussed. The patient is not currently smoking. The risks related to smoking were reviewed with the patient. Recommend maintaining a smoke-free lifestyle. Patient is on a beta-blocker  Patient is on an ace-i/ARB : CKD stage IV > Dr. Payal Guevara (has not made f/u appt yet)  Patient is on a statin    Antiplatelet therapy / anti-coagulation has been recommended / prescribed for this patient. Education conducted on adverse reactions including bleeding was discussed. The patient verbalizes understanding not to stop medications without discussing with us. Discussed exercise: 30-60 minutes 7 days/week  Discussed Low saturated fat diet.      Thank you for allowing to us to participate in the care of JAYLEN Evans    Documentation of today's visit sent to PCP >> No PCP listed

## 2020-03-02 ENCOUNTER — TELEPHONE (OUTPATIENT)
Dept: CARDIOLOGY CLINIC | Age: 55
End: 2020-03-02

## 2020-03-03 ENCOUNTER — TELEPHONE (OUTPATIENT)
Dept: CARDIOLOGY CLINIC | Age: 55
End: 2020-03-03

## 2020-03-17 RX ORDER — APIXABAN 5 MG/1
TABLET, FILM COATED ORAL
Qty: 60 TABLET | Refills: 3 | Status: SHIPPED | OUTPATIENT
Start: 2020-03-17 | End: 2020-09-16 | Stop reason: SDUPTHER

## 2020-04-02 RX ORDER — CARVEDILOL 25 MG/1
TABLET ORAL
Qty: 90 TABLET | Refills: 2 | Status: SHIPPED | OUTPATIENT
Start: 2020-04-02 | End: 2020-06-29

## 2020-04-07 RX ORDER — HYDRALAZINE HYDROCHLORIDE 100 MG/1
TABLET, FILM COATED ORAL
Qty: 90 TABLET | Refills: 0 | Status: SHIPPED | OUTPATIENT
Start: 2020-04-07 | End: 2020-05-18

## 2020-04-09 ENCOUNTER — TELEPHONE (OUTPATIENT)
Dept: CARDIOLOGY CLINIC | Age: 55
End: 2020-04-09

## 2020-04-13 RX ORDER — INSULIN GLARGINE 100 [IU]/ML
INJECTION, SOLUTION SUBCUTANEOUS
Qty: 5 PEN | Refills: 0 | Status: SHIPPED | OUTPATIENT
Start: 2020-04-13 | End: 2020-05-11

## 2020-04-29 ENCOUNTER — TELEPHONE (OUTPATIENT)
Dept: CARDIOLOGY CLINIC | Age: 55
End: 2020-04-29

## 2020-04-29 ENCOUNTER — APPOINTMENT (OUTPATIENT)
Dept: GENERAL RADIOLOGY | Age: 55
DRG: 286 | End: 2020-04-29
Payer: COMMERCIAL

## 2020-04-29 ENCOUNTER — HOSPITAL ENCOUNTER (INPATIENT)
Age: 55
LOS: 5 days | Discharge: HOME | DRG: 286 | End: 2020-05-04
Attending: STUDENT IN AN ORGANIZED HEALTH CARE EDUCATION/TRAINING PROGRAM | Admitting: INTERNAL MEDICINE
Payer: COMMERCIAL

## 2020-04-29 LAB
A/G RATIO: 1.2 (ref 1.1–2.2)
ALBUMIN SERPL-MCNC: 3.8 G/DL (ref 3.4–5)
ALP BLD-CCNC: 84 U/L (ref 40–129)
ALT SERPL-CCNC: 14 U/L (ref 10–40)
ANION GAP SERPL CALCULATED.3IONS-SCNC: 15 MMOL/L (ref 3–16)
AST SERPL-CCNC: 21 U/L (ref 15–37)
BASOPHILS ABSOLUTE: 0 K/UL (ref 0–0.2)
BASOPHILS RELATIVE PERCENT: 0.6 %
BILIRUB SERPL-MCNC: <0.2 MG/DL (ref 0–1)
BUN BLDV-MCNC: 46 MG/DL (ref 7–20)
CALCIUM SERPL-MCNC: 8.5 MG/DL (ref 8.3–10.6)
CHLORIDE BLD-SCNC: 100 MMOL/L (ref 99–110)
CO2: 20 MMOL/L (ref 21–32)
CREAT SERPL-MCNC: 5.4 MG/DL (ref 0.9–1.3)
CREATININE URINE: 68.6 MG/DL (ref 39–259)
EOSINOPHILS ABSOLUTE: 0.2 K/UL (ref 0–0.6)
EOSINOPHILS RELATIVE PERCENT: 3.5 %
GFR AFRICAN AMERICAN: 14
GFR NON-AFRICAN AMERICAN: 11
GLOBULIN: 3.2 G/DL
GLUCOSE BLD-MCNC: 110 MG/DL
GLUCOSE BLD-MCNC: 110 MG/DL (ref 70–99)
GLUCOSE BLD-MCNC: 119 MG/DL (ref 70–99)
GLUCOSE BLD-MCNC: 174 MG/DL (ref 70–99)
GLUCOSE BLD-MCNC: 406 MG/DL (ref 70–99)
HCT VFR BLD CALC: 48.9 % (ref 40.5–52.5)
HEMOGLOBIN: 15.5 G/DL (ref 13.5–17.5)
LYMPHOCYTES ABSOLUTE: 1.3 K/UL (ref 1–5.1)
LYMPHOCYTES RELATIVE PERCENT: 24.8 %
MCH RBC QN AUTO: 29 PG (ref 26–34)
MCHC RBC AUTO-ENTMCNC: 31.7 G/DL (ref 31–36)
MCV RBC AUTO: 91.5 FL (ref 80–100)
MONOCYTES ABSOLUTE: 0.7 K/UL (ref 0–1.3)
MONOCYTES RELATIVE PERCENT: 12.8 %
NEUTROPHILS ABSOLUTE: 3.1 K/UL (ref 1.7–7.7)
NEUTROPHILS RELATIVE PERCENT: 58.3 %
PDW BLD-RTO: 14.7 % (ref 12.4–15.4)
PERFORMED ON: ABNORMAL
PLATELET # BLD: 128 K/UL (ref 135–450)
PMV BLD AUTO: 10.9 FL (ref 5–10.5)
POTASSIUM SERPL-SCNC: 5.3 MMOL/L (ref 3.5–5.1)
PRO-BNP: 1688 PG/ML (ref 0–124)
RBC # BLD: 5.34 M/UL (ref 4.2–5.9)
SODIUM BLD-SCNC: 135 MMOL/L (ref 136–145)
SODIUM URINE: 71 MMOL/L
TOTAL PROTEIN: 7 G/DL (ref 6.4–8.2)
TROPONIN: 0.03 NG/ML
TROPONIN: 0.05 NG/ML
WBC # BLD: 5.3 K/UL (ref 4–11)

## 2020-04-29 PROCEDURE — 2580000003 HC RX 258: Performed by: INTERNAL MEDICINE

## 2020-04-29 PROCEDURE — 82570 ASSAY OF URINE CREATININE: CPT

## 2020-04-29 PROCEDURE — 93005 ELECTROCARDIOGRAM TRACING: CPT | Performed by: PHYSICIAN ASSISTANT

## 2020-04-29 PROCEDURE — 83880 ASSAY OF NATRIURETIC PEPTIDE: CPT

## 2020-04-29 PROCEDURE — 6370000000 HC RX 637 (ALT 250 FOR IP): Performed by: INTERNAL MEDICINE

## 2020-04-29 PROCEDURE — 6370000000 HC RX 637 (ALT 250 FOR IP): Performed by: PHYSICIAN ASSISTANT

## 2020-04-29 PROCEDURE — 85025 COMPLETE CBC W/AUTO DIFF WBC: CPT

## 2020-04-29 PROCEDURE — 99285 EMERGENCY DEPT VISIT HI MDM: CPT

## 2020-04-29 PROCEDURE — 80053 COMPREHEN METABOLIC PANEL: CPT

## 2020-04-29 PROCEDURE — 71046 X-RAY EXAM CHEST 2 VIEWS: CPT

## 2020-04-29 PROCEDURE — 84300 ASSAY OF URINE SODIUM: CPT

## 2020-04-29 PROCEDURE — 36415 COLL VENOUS BLD VENIPUNCTURE: CPT

## 2020-04-29 PROCEDURE — 84484 ASSAY OF TROPONIN QUANT: CPT

## 2020-04-29 PROCEDURE — 1200000000 HC SEMI PRIVATE

## 2020-04-29 RX ORDER — CARVEDILOL 25 MG/1
50 TABLET ORAL EVERY MORNING
Status: DISCONTINUED | OUTPATIENT
Start: 2020-04-30 | End: 2020-04-30

## 2020-04-29 RX ORDER — DILTIAZEM HYDROCHLORIDE 120 MG/1
120 CAPSULE, COATED, EXTENDED RELEASE ORAL DAILY
Status: DISCONTINUED | OUTPATIENT
Start: 2020-04-30 | End: 2020-04-30

## 2020-04-29 RX ORDER — SODIUM CHLORIDE 9 MG/ML
INJECTION, SOLUTION INTRAVENOUS CONTINUOUS
Status: DISCONTINUED | OUTPATIENT
Start: 2020-04-29 | End: 2020-04-30

## 2020-04-29 RX ORDER — ACETAMINOPHEN 325 MG/1
650 TABLET ORAL EVERY 6 HOURS PRN
Status: DISCONTINUED | OUTPATIENT
Start: 2020-04-29 | End: 2020-05-04 | Stop reason: HOSPADM

## 2020-04-29 RX ORDER — NITROGLYCERIN 0.4 MG/1
0.4 TABLET SUBLINGUAL 2 TIMES DAILY PRN
Status: DISCONTINUED | OUTPATIENT
Start: 2020-04-29 | End: 2020-05-04 | Stop reason: HOSPADM

## 2020-04-29 RX ORDER — DEXTROSE MONOHYDRATE 25 G/50ML
12.5 INJECTION, SOLUTION INTRAVENOUS PRN
Status: DISCONTINUED | OUTPATIENT
Start: 2020-04-29 | End: 2020-05-04 | Stop reason: HOSPADM

## 2020-04-29 RX ORDER — RANOLAZINE 500 MG/1
1000 TABLET, EXTENDED RELEASE ORAL 2 TIMES DAILY
Status: DISCONTINUED | OUTPATIENT
Start: 2020-04-29 | End: 2020-04-29

## 2020-04-29 RX ORDER — PROMETHAZINE HYDROCHLORIDE 25 MG/1
12.5 TABLET ORAL EVERY 6 HOURS PRN
Status: DISCONTINUED | OUTPATIENT
Start: 2020-04-29 | End: 2020-05-04 | Stop reason: HOSPADM

## 2020-04-29 RX ORDER — LANOLIN ALCOHOL/MO/W.PET/CERES
1000 CREAM (GRAM) TOPICAL DAILY
Status: DISCONTINUED | OUTPATIENT
Start: 2020-04-30 | End: 2020-05-04 | Stop reason: HOSPADM

## 2020-04-29 RX ORDER — ONDANSETRON 2 MG/ML
4 INJECTION INTRAMUSCULAR; INTRAVENOUS EVERY 6 HOURS PRN
Status: DISCONTINUED | OUTPATIENT
Start: 2020-04-29 | End: 2020-05-04 | Stop reason: HOSPADM

## 2020-04-29 RX ORDER — NICOTINE POLACRILEX 4 MG
15 LOZENGE BUCCAL PRN
Status: DISCONTINUED | OUTPATIENT
Start: 2020-04-29 | End: 2020-05-04 | Stop reason: HOSPADM

## 2020-04-29 RX ORDER — ASPIRIN 81 MG/1
81 TABLET, CHEWABLE ORAL DAILY
Status: DISCONTINUED | OUTPATIENT
Start: 2020-04-30 | End: 2020-05-04 | Stop reason: HOSPADM

## 2020-04-29 RX ORDER — ACETAMINOPHEN 650 MG/1
650 SUPPOSITORY RECTAL EVERY 6 HOURS PRN
Status: DISCONTINUED | OUTPATIENT
Start: 2020-04-29 | End: 2020-05-04 | Stop reason: HOSPADM

## 2020-04-29 RX ORDER — ASPIRIN 81 MG/1
244 TABLET, CHEWABLE ORAL ONCE
Status: COMPLETED | OUTPATIENT
Start: 2020-04-29 | End: 2020-04-29

## 2020-04-29 RX ORDER — POLYETHYLENE GLYCOL 3350 17 G/17G
17 POWDER, FOR SOLUTION ORAL DAILY PRN
Status: DISCONTINUED | OUTPATIENT
Start: 2020-04-29 | End: 2020-05-04 | Stop reason: HOSPADM

## 2020-04-29 RX ORDER — INSULIN GLARGINE 100 [IU]/ML
10 INJECTION, SOLUTION SUBCUTANEOUS NIGHTLY
Status: DISCONTINUED | OUTPATIENT
Start: 2020-04-29 | End: 2020-05-04 | Stop reason: HOSPADM

## 2020-04-29 RX ORDER — ROSUVASTATIN CALCIUM 40 MG/1
40 TABLET, COATED ORAL EVERY EVENING
Status: DISCONTINUED | OUTPATIENT
Start: 2020-04-29 | End: 2020-05-04 | Stop reason: HOSPADM

## 2020-04-29 RX ORDER — M-VIT,TX,IRON,MINS/CALC/FOLIC 27MG-0.4MG
1 TABLET ORAL DAILY
Status: DISCONTINUED | OUTPATIENT
Start: 2020-04-30 | End: 2020-05-04 | Stop reason: HOSPADM

## 2020-04-29 RX ORDER — RANOLAZINE 500 MG/1
500 TABLET, EXTENDED RELEASE ORAL 2 TIMES DAILY
Status: DISCONTINUED | OUTPATIENT
Start: 2020-04-30 | End: 2020-05-04 | Stop reason: HOSPADM

## 2020-04-29 RX ORDER — DEXTROSE MONOHYDRATE 50 MG/ML
100 INJECTION, SOLUTION INTRAVENOUS PRN
Status: DISCONTINUED | OUTPATIENT
Start: 2020-04-29 | End: 2020-05-04 | Stop reason: HOSPADM

## 2020-04-29 RX ORDER — SODIUM CHLORIDE 0.9 % (FLUSH) 0.9 %
10 SYRINGE (ML) INJECTION PRN
Status: DISCONTINUED | OUTPATIENT
Start: 2020-04-29 | End: 2020-05-01 | Stop reason: SDUPTHER

## 2020-04-29 RX ORDER — ASCORBIC ACID 500 MG
500 TABLET ORAL DAILY
Status: DISCONTINUED | OUTPATIENT
Start: 2020-04-30 | End: 2020-05-04 | Stop reason: HOSPADM

## 2020-04-29 RX ORDER — CARVEDILOL 25 MG/1
25 TABLET ORAL NIGHTLY
Status: DISCONTINUED | OUTPATIENT
Start: 2020-04-29 | End: 2020-04-30

## 2020-04-29 RX ORDER — SODIUM CHLORIDE 0.9 % (FLUSH) 0.9 %
10 SYRINGE (ML) INJECTION EVERY 12 HOURS SCHEDULED
Status: DISCONTINUED | OUTPATIENT
Start: 2020-04-29 | End: 2020-05-01 | Stop reason: SDUPTHER

## 2020-04-29 RX ORDER — FUROSEMIDE 40 MG/1
40 TABLET ORAL DAILY
Status: DISCONTINUED | OUTPATIENT
Start: 2020-04-30 | End: 2020-05-04 | Stop reason: HOSPADM

## 2020-04-29 RX ORDER — HYDRALAZINE HYDROCHLORIDE 50 MG/1
100 TABLET, FILM COATED ORAL EVERY 8 HOURS SCHEDULED
Status: DISCONTINUED | OUTPATIENT
Start: 2020-04-29 | End: 2020-05-04 | Stop reason: HOSPADM

## 2020-04-29 RX ORDER — OMEGA-3S/DHA/EPA/FISH OIL/D3 300MG-1000
400 CAPSULE ORAL 2 TIMES DAILY
Status: DISCONTINUED | OUTPATIENT
Start: 2020-04-30 | End: 2020-05-04 | Stop reason: HOSPADM

## 2020-04-29 RX ADMIN — APIXABAN 5 MG: 5 TABLET, FILM COATED ORAL at 20:52

## 2020-04-29 RX ADMIN — RANOLAZINE 1000 MG: 500 TABLET, FILM COATED, EXTENDED RELEASE ORAL at 20:52

## 2020-04-29 RX ADMIN — INSULIN LISPRO 1 UNITS: 100 INJECTION, SOLUTION INTRAVENOUS; SUBCUTANEOUS at 20:53

## 2020-04-29 RX ADMIN — CARVEDILOL 25 MG: 25 TABLET, FILM COATED ORAL at 20:52

## 2020-04-29 RX ADMIN — INSULIN GLARGINE 10 UNITS: 100 INJECTION, SOLUTION SUBCUTANEOUS at 20:53

## 2020-04-29 RX ADMIN — ASPIRIN 81 MG 244 MG: 81 TABLET ORAL at 18:17

## 2020-04-29 RX ADMIN — NITROGLYCERIN 0.4 MG: 0.4 TABLET, ORALLY DISINTEGRATING SUBLINGUAL at 18:12

## 2020-04-29 RX ADMIN — ROSUVASTATIN CALCIUM 40 MG: 40 TABLET, FILM COATED ORAL at 20:52

## 2020-04-29 RX ADMIN — SODIUM CHLORIDE: 9 INJECTION, SOLUTION INTRAVENOUS at 20:54

## 2020-04-29 RX ADMIN — NITROGLYCERIN 0.4 MG: 0.4 TABLET, ORALLY DISINTEGRATING SUBLINGUAL at 23:38

## 2020-04-29 RX ADMIN — Medication 10 ML: at 20:54

## 2020-04-29 RX ADMIN — HYDRALAZINE HYDROCHLORIDE 100 MG: 50 TABLET, FILM COATED ORAL at 20:52

## 2020-04-29 ASSESSMENT — PAIN SCALES - GENERAL
PAINLEVEL_OUTOF10: 0
PAINLEVEL_OUTOF10: 0
PAINLEVEL_OUTOF10: 1
PAINLEVEL_OUTOF10: 6
PAINLEVEL_OUTOF10: 0
PAINLEVEL_OUTOF10: 0

## 2020-04-29 ASSESSMENT — ENCOUNTER SYMPTOMS
CHEST TIGHTNESS: 1
SHORTNESS OF BREATH: 1
COUGH: 0
COLOR CHANGE: 0
NAUSEA: 1
VOMITING: 0
BACK PAIN: 0
ABDOMINAL PAIN: 0

## 2020-04-29 ASSESSMENT — PAIN DESCRIPTION - ORIENTATION
ORIENTATION: LEFT;MID
ORIENTATION: MID

## 2020-04-29 ASSESSMENT — PAIN - FUNCTIONAL ASSESSMENT: PAIN_FUNCTIONAL_ASSESSMENT: ACTIVITIES ARE NOT PREVENTED

## 2020-04-29 ASSESSMENT — PAIN DESCRIPTION - FREQUENCY
FREQUENCY: INTERMITTENT
FREQUENCY: INTERMITTENT

## 2020-04-29 ASSESSMENT — PAIN DESCRIPTION - PAIN TYPE
TYPE: ACUTE PAIN
TYPE: ACUTE PAIN

## 2020-04-29 ASSESSMENT — PAIN DESCRIPTION - LOCATION
LOCATION: CHEST
LOCATION: CHEST

## 2020-04-29 ASSESSMENT — PAIN DESCRIPTION - DESCRIPTORS
DESCRIPTORS: TIGHTNESS;STABBING
DESCRIPTORS: TIGHTNESS;STABBING

## 2020-04-29 ASSESSMENT — PAIN DESCRIPTION - ONSET
ONSET: AWAKENED FROM SLEEP
ONSET: SUDDEN

## 2020-04-29 NOTE — H&P
Hospital Medicine History & Physical      PCP: No primary care provider on file. Date of Admission: 4/29/2020    Date of Service: Pt seen/examined on 4/29/2020 and Admitted to Inpatient. Chief Complaint:  Chest pain      History Of Present Illness: The patient is a 50 y.o. male with hx CAD s/p CABG, carotid artery stenosis, type 1 DM, combined systolic/diastolic CHF, CKD stage 2, HTN, HLD, and PVD who presents to [facility] with chest pain. Patient usually has anginal chest pain treated with Ranexa. However, patient states that over the past 7-8 days, he has had intermittent squeezing substernal chest pain with nothing making the pain better and exertion making the pain worse. It radiates to the neck. Because the pain felt different and didn't karlo, he decided to come to the ED for further evaluation. He denies fever, chills, shortness of breath, abdominal pain, nausea, vomiting, constipation, diarrhea, and dysuria. In the ED, EKG showed T wave flattening in the lateral leads which are new. Initial troponin was 0.05. Serum creatinine was 5.4, pro-BNP of 1,688. CXR showed no acute cardiopulmonary process. Cardiology was consulted from the ED, and they advised admission to the hospital for observation and possible intervention.     Past Medical History:        Diagnosis Date    Angina at rest [facility])     Cardiomyopathy ([facility]     Carotid artery stenosis 10/25/2016    SUZANNA stented with 8 x 30 mm non-tapered Xact stent    CHF (congestive heart failure) ([facility] 3/3/15    EF 30%     CKD (chronic kidney disease) stage 2, GFR 60-89 ml/min     Coronary artery disease     sp CABG UC    Diabetic peripheral neuropathy (HCC)     Diastolic HF (heart failure) (HCC)     Hyperlipidemia with target LDL less than 70     Hypertension goal BP (blood pressure) < 130/80     PVD (peripheral vascular disease) ([facility] [address]     Seizures (HCC)     Type 1 diabetes mellitus with chronic kidney disease (HCC)     c-peptide <0.1 in 2015       Past Surgical History:        Procedure Laterality Date    CARDIAC CATHETERIZATION      CARDIAC SURGERY      triple bypass    CORONARY ARTERY BYPASS GRAFT      HERNIA REPAIR      TONSILLECTOMY         Medications Prior to Admission:    Prior to Admission medications    Medication Sig Start Date End Date Taking? Authorizing Provider   BASAGLAR KWIKPEN 100 UNIT/ML injection pen INJECT UNDER THE SKIN 20 UNITS NIGHTLY  WHEN OFF PUMP 4/13/20  Yes Lavinia Kothari MD   hydrALAZINE (APRESOLINE) 100 MG tablet TAKE ONE TABLET BY MOUTH THREE TIMES A DAY 4/7/20  Yes Ry Vega MD   carvedilol (COREG) 25 MG tablet TAKE TWO TABLETS BY MOUTH EVERY MORNING AND TAKE ONE TABLET BY MOUTH EVERY EVENING 4/2/20  Yes JAYLEN Miranda CNP   ELIQUIS 5 MG TABS tablet TAKE ONE TABLET BY MOUTH TWICE A DAY 3/17/20  Yes Ry Vega MD   rosuvastatin (CRESTOR) 40 MG tablet TAKE ONE TABLET BY MOUTH EVERY EVENING 2/28/20  Yes JAYLEN Miranda - CNP   ranolazine (RANEXA) 1000 MG extended release tablet Take 1 tablet by mouth 2 times daily 2/28/20  Yes JAYLEN Miranda CNP   furosemide (LASIX) 40 MG tablet TAKE ONE TABLET BY MOUTH DAILY 1/27/20  Yes Ry Vega MD   diltiazem (CARDIZEM CD) 120 MG extended release capsule TAKE ONE CAPSULE BY MOUTH DAILY 1/27/20  Yes Ry Vega MD   insulin aspart (NOVOLOG) 100 UNIT/ML injection vial 20-30 units daily via pump  Patient taking differently: Bases dose off insulin sensitivity and carb ratio for dosing.  12/9/19  Yes Lavinia Kothari MD   vitamin D3 (CHOLECALCIFEROL) 10 MCG (400 UNIT) TABS tablet Take 400 Units by mouth 2 times daily   Yes Historical Provider, MD   vitamin C (ASCORBIC ACID) 500 MG tablet Take 500 mg by mouth daily   Yes Historical Provider, MD   Methylcobalamin (B-12) 1000 MCG TBDP Place 1,000 mcg under the tongue daily   Yes Historical Provider, MD   aspirin 81 MG tablet Take 1 tablet by mouth daily 10/26/16  Yes Nicole Vital, APRN - CNP   Multiple Vitamins-Minerals (THERAPEUTIC MULTIVITAMIN-MINERALS) tablet Take 1 tablet by mouth daily. Yes Historical Provider, MD   nitroGLYCERIN (NITROSTAT) 0.4 MG SL tablet Place 1 tablet under the tongue every 5 minutes as needed for Chest pain 2/28/20   Madison Salmeron, APRN - CNP   glucagon (GLUCAGON EMERGENCY) 1 MG injection 1mg Im as needed 12/10/19   Linda Cleveland MD   blood glucose test strips (ACCU-CHEK AIMEE) strip 6 times daily. 12/10/19   Linda Cleveland MD   [employer] Ultra Thin Lancets MISC 6 times a day 12/10/19   Linda Cleveland MD   Blood Glucose Monitoring Suppl ([address] [place] [address]) [address] As needed 12/10/19   Linda Cleveland MD   SOFT TOUCH LANCETS MISC 6 times a day 12/10/19   Linda Cleveland MD   blood glucose test strips [facility]) strip 1 each by In Vitro route daily 6-7 times a day As needed. 12/9/19   Linda Cleveland MD   Continuous Blood Gluc  (FREESTYLE SAL READER) BEHZAD As needed 6/1/18   Linda Cleveland MD   Continuous Blood Gluc Sensor ([address] [place] [address]) MISC Every 10 days 6/1/18   Linda Cleveland MD   Elastic Bandages & Supports (MEDICAL COMPRESSION STOCKINGS) MISC 15-20 mmHg compression, wear daily during the day 5/24/18   Nathanael Sullivan MD   Blood Glucose Monitoring Suppl ([address]) w/Device KIT Use four times a day and as needed 5/10/18   Oren Avila MD   EASY TOUCH LANCETS 32G MISC Use 3 times daily with meals and at bed time 5/10/18   Oren Avila MD   glucose blood VI test strips (EASY TOUCH TEST) strip 1 each by In Vitro route 4 times daily 5/10/18   Oren Avila MD       Allergies: Iodides and Shellfish-derived products    Social History:  The patient currently lives at home. TOBACCO:   reports that he has never smoked.  He has never used smokeless tobacco.  ETOH:   reports current alcohol use. Family History:  Reviewed in detail and negative for DM, Early CAD, Cancer, CVA. Positive as follows:        Problem Relation Age of Onset    Coronary Art Dis Father     Diabetes Father     Coronary Art Dis Paternal Grandmother     Diabetes Paternal Grandmother     Hypertension Mother     Other Mother         Epilepsy    Other Sister         Thyroid disease  Heart murmur    Mult Sclerosis Brother     Arthritis Brother        REVIEW OF SYSTEMS:   Positive for and as noted in the HPI. All other systems reviewed and negative. PHYSICAL EXAM:    BP (!) 166/83   Pulse 73   Temp 98.1 °F (36.7 °C) (Oral)   Resp 16   Ht 5' 7.5\" (1.715 m)   Wt 212 lb 1.3 oz (96.2 kg)   SpO2 100%   BMI 32.73 kg/m²     General appearance: No apparent distress appears stated age and cooperative. HEENT Normal cephalic, atraumatic without obvious deformity. Pupils equal, round, and reactive to light. Extra ocular muscles intact. Conjunctivae/corneas clear. Neck: Supple, No jugular venous distention/bruits. Trachea midline without thyromegaly or adenopathy with full range of motion. Lungs: Clear to auscultation, bilaterally without Rales/Wheezes/Rhonchi with good respiratory effort. Heart: Regular rate and rhythm with Normal S1/S2 without murmurs, rubs or gallops, point of maximum impulse non-displaced  Abdomen: Soft, non-tender or non-distended without rigidity or guarding and positive bowel sounds all four quadrants. Extremities: No clubbing, cyanosis, or edema bilaterally. Full range of motion without deformity and normal gait intact. Skin: Skin color, texture, turgor normal.  No rashes or lesions. Neurologic: Alert and oriented X 3, neurovascularly intact with sensory/motor intact upper extremities/lower extremities, bilaterally. Cranial nerves: II-XII intact, grossly non-focal.  Mental status: Alert, oriented, thought content appropriate.   Capillary Refill: Acceptable  < 3 seconds  Peripheral Pulses: +3 Easily felt, not easily obliterated with pressure      CXR:  I have reviewed the CXR with the following interpretation: no acute cardiopulmonary process  EKG:  I have reviewed the EKG with the following interpretation: T wave flattening in lateral leads    XR CHEST STANDARD (2 VW)   Final Result   No acute cardiopulmonary abnormality identified. CBC   Recent Labs     04/29/20  1739   WBC 5.3   HGB 15.5   HCT 48.9   *      RENAL  Recent Labs     04/29/20  1722   *   K 5.3*      CO2 20*   BUN 46*   CREATININE 5.4*     LFT'S  Recent Labs     04/29/20  1722   AST 21   ALT 14   BILITOT <0.2   ALKPHOS 84     COAG  No results for input(s): INR in the last 72 hours. CARDIAC ENZYMES  Recent Labs     04/29/20  1722   TROPONINI 0.05*       U/A:    Lab Results   Component Value Date    COLORU YELLOW 10/23/2019    WBCUA 0 10/23/2019    RBCUA 0 10/23/2019    MUCUS 1+ 05/25/2010    BACTERIA Rare 05/25/2010    CLARITYU Clear 10/23/2019    SPECGRAV 1.018 10/23/2019    LEUKOCYTESUR Negative 10/23/2019    BLOODU Negative 10/23/2019    GLUCOSEU >=1000 10/23/2019    GLUCOSEU >=1000 05/25/2010       ABG    Lab Results   Component Value Date    YZW9QCG 25.6 05/26/2010    BEART 0.3 05/26/2010    R3FOEKXY 99.0 05/26/2010    PHART 7.389 05/26/2010    THGBART 16.1 05/26/2010    RFH3RAA 43.3 05/26/2010    PO2ART 137.3 05/26/2010           Active Hospital Problems    Diagnosis Date Noted    Chest pain [R07.9] 05/18/2016         PHYSICIANS CERTIFICATION:    I certify that Jewell Cardozo is expected to be hospitalized for more than 2 midnights based on the following assessment and plan:      ASSESSMENT/PLAN:      Chest pain - patient has extensive cardiac history including CABG and multiple risk factors including type 1 DM, PVD, HTN, HLD, CKD stage 2. Appears to have some T wave flattening in the lateral leads that is new.  States this chest pain is different

## 2020-04-29 NOTE — TELEPHONE ENCOUNTER
Needs to go to the ER ; make sure that he's not taking viagra REASON FOR VISIT:    Gabriela Crawford is a 78year old male who presents for a Medicare Annual Wellness visit.      Patient Care Team: Patient Care Team:  Eren Gottlieb MD as PCP - General (Internal Medicine)  Ivy Das MD as PCP - [facility] AST and ALT Latest Ref Rng & Units 8/21/2018 8/9/2017 10/20/2016   AST 15 - 41 U/L 21 18 20   ALT 17 - 63 U/L 19 14(L) 23     TSH and Free T4 Latest Ref Rng & Units 8/21/2018 9/9/2017 8/9/2017 10/20/2016   TSH 0.45 - 5.33 uIU/mL 3.61 2.28 1.15 1.53 1-Yes  Does pain affect your day to day activities?: (P) 1-Yes  Have you had any memory issues?: (P) 0-No  Fall/Risk Scoring: (P) 6        Depression Screening (PHQ-2/PHQ-9): Over the LAST 2 WEEKS   Little interest or pleasure in doing things (over the las • TOTAL KNEE REPLACEMENT  2005, 2008     Has had 15 surgeries on Right Knee       Family History   Problem Relation Age of Onset   • Hypertension Maternal Grandmother    • Other (Other) Father         parkinson disease   • Dementia Mother    • Felipe Reno BS's, no masses, HSM or tenderness  : two descended testicles, no masses, no hernia and no penile lesions  RECTAL: deferred  MUSCULOSKELETAL: back is not tender, FROM of the back  EXTREMITIES: no cyanosis, clubbing or edema  NEURO: Oriented times three, patient. Pneumonia: There are no preventive care reminders to display for this patient.   Shingrix shingles vaccine is due

## 2020-04-29 NOTE — ED PROVIDER NOTES
MidLevel Attestation   I havepersonally performed and/or participated in the history, exam and medical decision making and agree with all pertinent clinical information. I have also reviewed and agree with the past medical, family and social historyunless otherwise noted. I have personally performed a face to face diagnostic evaluation onthis patient. I have reviewed the mid-levels findings and agree. In brief, Juma Reyes is a 50 y.o. male that presented to the emergency department with complaints of chest pain. On exam nontoxic hemodynamic stable afebrile, and the rest of the exam unremarkable. EKG by my preliminary interpretation shows sinus rhythm with rate of 73, normal axis, normal intervals, with no ST changes indicative of ischemia at this time. Of note there was some abnormal findings on V1, V2, III concerning for elevation I decided to repeat the EKG which showed similar findings. At this time I was concerned and I contacted cardiology who did not think patient needed any intervention at this time but further evaluation.     I have reviewed and interpreted all of the currently available lab results and diagnostics from this visit:  Results for orders placed or performed during the hospital encounter of 04/29/20   CBC Auto Differential   Result Value Ref Range    WBC 5.3 4.0 - 11.0 K/uL    RBC 5.34 4.20 - 5.90 M/uL    Hemoglobin 15.5 13.5 - 17.5 g/dL    Hematocrit 48.9 40.5 - 52.5 %    MCV 91.5 80.0 - 100.0 fL    MCH 29.0 26.0 - 34.0 pg    MCHC 31.7 31.0 - 36.0 g/dL    RDW 14.7 12.4 - 15.4 %    Platelets 685 (L) 290 - 450 K/uL    MPV 10.9 (H) 5.0 - 10.5 fL    Neutrophils % 58.3 %    Lymphocytes % 24.8 %    Monocytes % 12.8 %    Eosinophils % 3.5 %    Basophils % 0.6 %    Neutrophils Absolute 3.1 1.7 - 7.7 K/uL    Lymphocytes Absolute 1.3 1.0 - 5.1 K/uL    Monocytes Absolute 0.7 0.0 - 1.3 K/uL    Eosinophils Absolute 0.2 0.0 - 0.6 K/uL    Basophils Absolute 0.0 0.0 - 0.2 K/uL MAR action:  Given - by Behzad Donald on 04/30/20 at [facility], [address]    04/29/20 2200 04/29/20 2013  hydrALAZINE (APRESOLINE) tablet 100 mg  3 times per day      Last MAR action:  Given - by Behzad Donald on 04/29/20 at [facility]    04/29/20 2100 04/29/20 2013  insulin glargine (LANTUS) injection vial 10 Units  NIGHTLY      Last MAR action:  Given - by Behzad Donald on 04/29/20 at [facility] [facility]    04/29/20 2100 04/29/20 2013  carvedilol (COREG) tablet 25 mg  NIGHTLY      Last MAR action:  Given - by Behzad Donald on 04/29/20 at [facility]    04/29/20 2100 04/29/20 2013  apixaban (ELIQUIS) tablet 5 mg  2 TIMES DAILY      Last MAR action:  Given - by Behzad Donald on 04/29/20 at [facility]    04/29/20 2100 04/29/20 2013  insulin lispro (HUMALOG) injection vial 0-6 Units  NIGHTLY      Last MAR action:  Given - by Behzad Donald on 04/29/20 at [address] [facility]    04/29/20 2100 04/29/20 2013  rosuvastatin (CRESTOR) tablet 40 mg  EVERY EVENING      Last MAR action:  Given - by Behzad Donald on 04/29/20 at [facility]    04/29/20 2100 04/29/20 2013  sodium chloride flush 0.9 % injection 10 mL  2 times per day      Last MAR action:  Given - by Behzad Donald on 04/29/20 at [facility]    04/29/20 2100 04/29/20 2040  0.9 % sodium chloride infusion  CONTINUOUS      Last MAR action:  New Bag - by Behzad Donald on 04/29/20 at [address] [facility]    04/29/20 2013 04/29/20 2013  acetaminophen (TYLENOL) tablet 650 mg  EVERY 6 HOURS PRN      Acknowledged [facility]    04/29/20 2013 04/29/20 2013  acetaminophen (TYLENOL) suppository 650 mg  EVERY 6 HOURS PRN      Acknowledged Praveen PAREKH    04/29/20 2013 04/29/20 2013  promethazine (PHENERGAN) tablet 12.5 mg  EVERY 6 HOURS PRN      Acknowledged Praveen PAREKH    04/29/20 2013 04/29/20 2013  ondansetron (ZOFRAN) injection 4 mg  EVERY 6 HOURS PRN      Acknowledged Frandy Rang    04/29/20 2013 04/29/20 2013  glucose (GLUTOSE) 40 % oral gel 15 g  PRN      Acknowledged Crystal Vences B    04/29/20 2013 04/29/20 2013  glucagon (rDNA) injection 1 mg  PRN      Acknowledged Crystal Case B    04/29/20 2013 04/29/20 2013  sodium chloride flush 0.9 % injection 10 mL  PRN      Acknowledged ZULEYKA THOMSON    04/29/20 2013 04/29/20 2013  polyethylene glycol (GLYCOLAX) packet 17 g  DAILY PRN      Acknowledged Crystal Case B    04/29/20 2013 04/29/20 2013  dextrose 50 % IV solution  PRN      Acknowledged ALIXZULEYKA PARRA    04/29/20 2013 04/29/20 2013  dextrose 5 % solution  PRN      Acknowledged ALIXZULEYKA IGLESIA    04/29/20 1800 04/29/20 1757  aspirin chewable tablet 244 mg  ONCE      Last MAR action:  Given - by Lauren OROZCO on 04/29/20 at [facility]    04/29/20 1756 04/29/20 1756  nitroGLYCERIN (NITROSTAT) SL tablet 0.4 mg  2 TIMES DAILY PRN      Last MAR action:  Given - by Rudolph Machuca on 04/30/20 at [facility], [facility]        81-year-old gentleman with extensive cardiac history who presents with chest pain. Unremarkable but EKG was concerning for which I did contact the cardiology who recommended no intervention but further evaluation. Labs were unremarkable. Given his cardiac history and complaint of chest pain his calculated heart score was greater than 4 requiring admission. Specialist was consulted and patient was admitted to their service. Final Impression      1. Chest pain, unspecified type    2. STEPH (acute kidney injury) ([facility] [address]    3. Elevated brain natriuretic peptide (BNP) level    4.  Flattened T wave        DISPOSITION Admitted 04/29/2020 06:45:41 PM       Amount and/or Complexity of Data Reviewed:  Clinical lab tests: ordered and reviewed   Tests in the radiology section of CPT®: ordered and reviewed   Tests in the medicine section of CPT®: ordered and reviewed   Decide to obtain previous medical records or to obtain history from someone

## 2020-04-29 NOTE — ED PROVIDER NOTES
(bilateral feet) present. Skin:     General: Skin is warm. Neurological:      General: No focal deficit present. Mental Status: He is alert and oriented to person, place, and time. Psychiatric:         Mood and Affect: Mood normal.         Behavior: Behavior normal.         DIAGNOSTIC RESULTS     EKG: All EKG's are interpreted by VERONICA Shaver in the absence of a cardiologist.    EKG interpreted by myself - please refer to attending physician's note for complete EKG interpretation:    Rhythm: sinus rhythm   New T wave flattening in lead V2.    RADIOLOGY:   Non-plain film images such as CT, Ultrasound and MRI are read by the radiologist. Plain radiographic images are visualized and preliminarily interpreted by VERONICA Shaver with the below findings:    Reviewed radiologist's interpretation. Interpretation per the Radiologist below, if available at the time of this note:    XR CHEST STANDARD (2 VW)   Final Result   No acute cardiopulmonary abnormality identified.                LABS:  Labs Reviewed   CBC WITH AUTO DIFFERENTIAL - Abnormal; Notable for the following components:       Result Value    Platelets 257 (*)     MPV 10.9 (*)     All other components within normal limits    Narrative:     Performed at:  [address] [employer] [address]   Phone [phone]   COMPREHENSIVE METABOLIC PANEL - Abnormal; Notable for the following components:    Sodium 135 (*)     Potassium 5.3 (*)     CO2 20 (*)     Glucose 119 (*)     BUN 46 (*)     CREATININE 5.4 (*)     GFR Non- 11 (*)     GFR  14 (*)     All other components within normal limits    Narrative:     [facility],  Chemistry results called to and read back by John Shin RN, 04/29/2020  18:13, by Bianca Nolen  Performed at:  [address] [employer] [address]   Phone [phone]

## 2020-04-30 ENCOUNTER — APPOINTMENT (OUTPATIENT)
Dept: ULTRASOUND IMAGING | Age: 55
DRG: 286 | End: 2020-04-30
Payer: COMMERCIAL

## 2020-04-30 LAB
A/G RATIO: 1.3 (ref 1.1–2.2)
ALBUMIN SERPL-MCNC: 3.7 G/DL (ref 3.4–5)
ALP BLD-CCNC: 84 U/L (ref 40–129)
ALT SERPL-CCNC: 11 U/L (ref 10–40)
ANION GAP SERPL CALCULATED.3IONS-SCNC: 14 MMOL/L (ref 3–16)
AST SERPL-CCNC: 11 U/L (ref 15–37)
BILIRUB SERPL-MCNC: <0.2 MG/DL (ref 0–1)
BUN BLDV-MCNC: 51 MG/DL (ref 7–20)
CALCIUM SERPL-MCNC: 8.5 MG/DL (ref 8.3–10.6)
CHLORIDE BLD-SCNC: 103 MMOL/L (ref 99–110)
CO2: 24 MMOL/L (ref 21–32)
CREAT SERPL-MCNC: 5.7 MG/DL (ref 0.9–1.3)
EKG ATRIAL RATE: 73 BPM
EKG ATRIAL RATE: 79 BPM
EKG DIAGNOSIS: NORMAL
EKG DIAGNOSIS: NORMAL
EKG P AXIS: 61 DEGREES
EKG P AXIS: 68 DEGREES
EKG P-R INTERVAL: 142 MS
EKG P-R INTERVAL: 150 MS
EKG Q-T INTERVAL: 416 MS
EKG Q-T INTERVAL: 432 MS
EKG QRS DURATION: 100 MS
EKG QRS DURATION: 98 MS
EKG QTC CALCULATION (BAZETT): 475 MS
EKG QTC CALCULATION (BAZETT): 477 MS
EKG R AXIS: 62 DEGREES
EKG R AXIS: 73 DEGREES
EKG T AXIS: -8 DEGREES
EKG T AXIS: 7 DEGREES
EKG VENTRICULAR RATE: 73 BPM
EKG VENTRICULAR RATE: 79 BPM
GFR AFRICAN AMERICAN: 13
GFR NON-AFRICAN AMERICAN: 10
GLOBULIN: 2.8 G/DL
GLUCOSE BLD-MCNC: 114 MG/DL (ref 70–99)
GLUCOSE BLD-MCNC: 134 MG/DL (ref 70–99)
GLUCOSE BLD-MCNC: 155 MG/DL (ref 70–99)
GLUCOSE BLD-MCNC: 187 MG/DL (ref 70–99)
GLUCOSE BLD-MCNC: 268 MG/DL (ref 70–99)
HCT VFR BLD CALC: 46.8 % (ref 40.5–52.5)
HEMOGLOBIN: 15.1 G/DL (ref 13.5–17.5)
MCH RBC QN AUTO: 29.2 PG (ref 26–34)
MCHC RBC AUTO-ENTMCNC: 32.3 G/DL (ref 31–36)
MCV RBC AUTO: 90.4 FL (ref 80–100)
PDW BLD-RTO: 14.6 % (ref 12.4–15.4)
PERFORMED ON: ABNORMAL
PLATELET # BLD: 122 K/UL (ref 135–450)
PMV BLD AUTO: 10.9 FL (ref 5–10.5)
POTASSIUM REFLEX MAGNESIUM: 4 MMOL/L (ref 3.5–5.1)
POTASSIUM SERPL-SCNC: 4 MMOL/L (ref 3.5–5.1)
RBC # BLD: 5.17 M/UL (ref 4.2–5.9)
SODIUM BLD-SCNC: 141 MMOL/L (ref 136–145)
TOTAL PROTEIN: 6.5 G/DL (ref 6.4–8.2)
TROPONIN: 0.04 NG/ML
TROPONIN: 0.04 NG/ML
WBC # BLD: 5.2 K/UL (ref 4–11)

## 2020-04-30 PROCEDURE — 36415 COLL VENOUS BLD VENIPUNCTURE: CPT

## 2020-04-30 PROCEDURE — 6370000000 HC RX 637 (ALT 250 FOR IP): Performed by: NURSE PRACTITIONER

## 2020-04-30 PROCEDURE — 85027 COMPLETE CBC AUTOMATED: CPT

## 2020-04-30 PROCEDURE — 99223 1ST HOSP IP/OBS HIGH 75: CPT | Performed by: INTERNAL MEDICINE

## 2020-04-30 PROCEDURE — 2580000003 HC RX 258: Performed by: INTERNAL MEDICINE

## 2020-04-30 PROCEDURE — 84484 ASSAY OF TROPONIN QUANT: CPT

## 2020-04-30 PROCEDURE — 1200000000 HC SEMI PRIVATE

## 2020-04-30 PROCEDURE — 93010 ELECTROCARDIOGRAM REPORT: CPT | Performed by: INTERNAL MEDICINE

## 2020-04-30 PROCEDURE — 76770 US EXAM ABDO BACK WALL COMP: CPT

## 2020-04-30 PROCEDURE — 6370000000 HC RX 637 (ALT 250 FOR IP): Performed by: INTERNAL MEDICINE

## 2020-04-30 PROCEDURE — 80053 COMPREHEN METABOLIC PANEL: CPT

## 2020-04-30 PROCEDURE — 76775 US EXAM ABDO BACK WALL LIM: CPT

## 2020-04-30 RX ORDER — ALPRAZOLAM 0.5 MG/1
1 TABLET ORAL ONCE
Status: COMPLETED | OUTPATIENT
Start: 2020-04-30 | End: 2020-04-30

## 2020-04-30 RX ORDER — AMLODIPINE BESYLATE 10 MG/1
10 TABLET ORAL DAILY
Status: DISCONTINUED | OUTPATIENT
Start: 2020-04-30 | End: 2020-05-04 | Stop reason: HOSPADM

## 2020-04-30 RX ORDER — CARVEDILOL 25 MG/1
25 TABLET ORAL 2 TIMES DAILY
Status: DISCONTINUED | OUTPATIENT
Start: 2020-04-30 | End: 2020-05-01

## 2020-04-30 RX ORDER — LACTULOSE 10 G/15ML
20 SOLUTION ORAL 3 TIMES DAILY
Status: DISCONTINUED | OUTPATIENT
Start: 2020-04-30 | End: 2020-05-01

## 2020-04-30 RX ORDER — TAMSULOSIN HYDROCHLORIDE 0.4 MG/1
0.4 CAPSULE ORAL NIGHTLY
Status: DISCONTINUED | OUTPATIENT
Start: 2020-04-30 | End: 2020-05-04 | Stop reason: HOSPADM

## 2020-04-30 RX ADMIN — CYANOCOBALAMIN TAB 1000 MCG 1000 MCG: 1000 TAB at 10:17

## 2020-04-30 RX ADMIN — LACTULOSE 20 G: 20 SOLUTION ORAL at 10:16

## 2020-04-30 RX ADMIN — INSULIN LISPRO 3 UNITS: 100 INJECTION, SOLUTION INTRAVENOUS; SUBCUTANEOUS at 20:19

## 2020-04-30 RX ADMIN — INSULIN GLARGINE 10 UNITS: 100 INJECTION, SOLUTION SUBCUTANEOUS at 20:19

## 2020-04-30 RX ADMIN — LACTULOSE 20 G: 20 SOLUTION ORAL at 12:58

## 2020-04-30 RX ADMIN — RANOLAZINE 500 MG: 500 TABLET, FILM COATED, EXTENDED RELEASE ORAL at 10:16

## 2020-04-30 RX ADMIN — DILTIAZEM HYDROCHLORIDE 120 MG: 120 CAPSULE, COATED, EXTENDED RELEASE ORAL at 10:16

## 2020-04-30 RX ADMIN — CARVEDILOL 50 MG: 25 TABLET, FILM COATED ORAL at 10:17

## 2020-04-30 RX ADMIN — ASPIRIN 81 MG 81 MG: 81 TABLET ORAL at 10:16

## 2020-04-30 RX ADMIN — HYDRALAZINE HYDROCHLORIDE 100 MG: 50 TABLET, FILM COATED ORAL at 21:43

## 2020-04-30 RX ADMIN — TAMSULOSIN HYDROCHLORIDE 0.4 MG: 0.4 CAPSULE ORAL at 20:18

## 2020-04-30 RX ADMIN — Medication 10 ML: at 10:16

## 2020-04-30 RX ADMIN — CARVEDILOL 25 MG: 25 TABLET, FILM COATED ORAL at 20:18

## 2020-04-30 RX ADMIN — NITROGLYCERIN 0.4 MG: 0.4 TABLET, ORALLY DISINTEGRATING SUBLINGUAL at 00:40

## 2020-04-30 RX ADMIN — CHOLECALCIFEROL (VITAMIN D3) 10 MCG (400 UNIT) TABLET 400 UNITS: at 10:16

## 2020-04-30 RX ADMIN — INSULIN LISPRO 2 UNITS: 100 INJECTION, SOLUTION INTRAVENOUS; SUBCUTANEOUS at 17:06

## 2020-04-30 RX ADMIN — LACTULOSE 20 G: 20 SOLUTION ORAL at 20:18

## 2020-04-30 RX ADMIN — Medication 10 ML: at 20:18

## 2020-04-30 RX ADMIN — HYDRALAZINE HYDROCHLORIDE 100 MG: 50 TABLET, FILM COATED ORAL at 04:17

## 2020-04-30 RX ADMIN — APIXABAN 5 MG: 5 TABLET, FILM COATED ORAL at 20:18

## 2020-04-30 RX ADMIN — CHOLECALCIFEROL (VITAMIN D3) 10 MCG (400 UNIT) TABLET 400 UNITS: at 20:18

## 2020-04-30 RX ADMIN — OXYCODONE HYDROCHLORIDE AND ACETAMINOPHEN 500 MG: 500 TABLET ORAL at 10:17

## 2020-04-30 RX ADMIN — APIXABAN 5 MG: 5 TABLET, FILM COATED ORAL at 10:17

## 2020-04-30 RX ADMIN — INSULIN LISPRO 2 UNITS: 100 INJECTION, SOLUTION INTRAVENOUS; SUBCUTANEOUS at 12:53

## 2020-04-30 RX ADMIN — HYDRALAZINE HYDROCHLORIDE 100 MG: 50 TABLET, FILM COATED ORAL at 12:58

## 2020-04-30 RX ADMIN — AMLODIPINE BESYLATE 10 MG: 10 TABLET ORAL at 15:18

## 2020-04-30 RX ADMIN — INSULIN LISPRO 6 UNITS: 100 INJECTION, SOLUTION INTRAVENOUS; SUBCUTANEOUS at 00:19

## 2020-04-30 RX ADMIN — ROSUVASTATIN CALCIUM 40 MG: 40 TABLET, FILM COATED ORAL at 20:17

## 2020-04-30 RX ADMIN — MULTIPLE VITAMINS W/ MINERALS TAB 1 TABLET: TAB at 10:16

## 2020-04-30 RX ADMIN — NITROGLYCERIN 0.4 MG: 0.4 TABLET, ORALLY DISINTEGRATING SUBLINGUAL at 04:16

## 2020-04-30 RX ADMIN — ALPRAZOLAM 1 MG: 0.5 TABLET ORAL at 00:22

## 2020-04-30 RX ADMIN — RANOLAZINE 500 MG: 500 TABLET, FILM COATED, EXTENDED RELEASE ORAL at 20:17

## 2020-04-30 ASSESSMENT — PAIN SCALES - GENERAL
PAINLEVEL_OUTOF10: 0
PAINLEVEL_OUTOF10: 0
PAINLEVEL_OUTOF10: 3
PAINLEVEL_OUTOF10: 0

## 2020-04-30 ASSESSMENT — PAIN DESCRIPTION - FREQUENCY: FREQUENCY: INTERMITTENT

## 2020-04-30 ASSESSMENT — PAIN DESCRIPTION - ONSET: ONSET: SUDDEN

## 2020-04-30 ASSESSMENT — PAIN - FUNCTIONAL ASSESSMENT: PAIN_FUNCTIONAL_ASSESSMENT: ACTIVITIES ARE NOT PREVENTED

## 2020-04-30 ASSESSMENT — PAIN DESCRIPTION - DESCRIPTORS: DESCRIPTORS: PRESSURE;TIGHTNESS;STABBING

## 2020-04-30 ASSESSMENT — PAIN DESCRIPTION - ORIENTATION: ORIENTATION: MID;LEFT

## 2020-04-30 ASSESSMENT — PAIN DESCRIPTION - PAIN TYPE: TYPE: ACUTE PAIN

## 2020-04-30 ASSESSMENT — PAIN DESCRIPTION - LOCATION: LOCATION: CHEST

## 2020-04-30 NOTE — CONSULTS
[facility])  Nephrology Consult H&P  4/30/2020 1:08 PM     Patient: Gurwinder Bangura [id number]  [id number]  Date of Admit: 4/29/2020 LOS: 1 days  Referring physician: Fabiola Heimlich, MD  Outpatient Nephrologist:  Dr. Tam MENDOZA [facility] office)    HPI  Gurwinder Bangura is a 50 y.o. male with a past medical history of DM1 c/b PDR Neuropathy Nephropathy, CAD s/p CABG 2001 UC, HTN for years uncontrolled, Carotid Stenosis s/p R ICA stent 2016, CHFrEF 30%, Seizures who presented 4/29/2020  to [facility] with chief concern Chest Pain for whom we are consulted 4/30/20 for STEPH/CKD. Patient presented to [facility] with Chest pain with radiation to neck that has been worsening over past week prompting him to come in. EKG had some T wave flattening per reports in ED. Findings on admission notable for weight of 210 lb (baseline 200), SBP in the 210s, Scr 5.4 (Baseline 4.5 in 2019). His troponins have been relatively stable < 0.05 this admission. He reports he has not seen his nephrologist despite multiple attempts and admissions over past year due to feeling overwhelmed and moving a lot. Recently went through divorce, moved to [address] [place] [address] and now with a new girlfriend MIMIN who is convincing himself to re-establish care with his doctors. Appears to have KATIE symptoms but has not been tested more so than true orthopnea. He otherwise feels fatigued today, but better. No current active CP/SOB per patient. On initial consult renal pertinent ROS included:  Endorsed: Resolved CP/SOB.  + LE edema.  + PND/+Orthopnea + Weight gain  Denied: Nocturia, hematuria, NSAID use, frothy urine   Other: Denied F/C/N/V/D    Review of Systems Significant as listed in HPI otherwise 10 point ROS negative  Past medical, family, and social histories were reviewed as previously documented. Updates were made as necessary. Updated & Discussed with: Patient, no family at bedside.     Assessment & YELLOW 10/23/2019    CLARITYU Clear 10/23/2019    PHUR 5.5 10/23/2019    GLUCOSEU >=1000 (A) 10/23/2019    BLOODU Negative 10/23/2019    LEUKOCYTESUR Negative 10/23/2019    BILIRUBINUR Negative 10/23/2019    UROBILINOGEN 0.2 10/23/2019    RBCUA 0 10/23/2019    WBCUA 0 10/23/2019    BACTERIA Rare 05/25/2010         Lab Results   Component Value Date    HEPAIGM Non-reactive 03/13/2015    HEPBIGM Non-reactive 03/13/2015    HEPBCAB Negative 05/07/2018      Recent Labs     04/29/20  1722 04/30/20  0729   ALT 14 11   AST 21 11*   ALKPHOS 84 84   BILITOT <0.2 <0.2     Recent Labs     04/29/20  2051 04/30/20  0138 04/30/20  1009   TROPONINI 0.03* 0.04* 0.04*     Lab Results   Component Value Date    LABA1C 10.3 12/09/2019    LABA1C 11.5 07/25/2019    LABA1C 8.4 10/15/2018     Lab Results   Component Value Date    .4 07/25/2019       No results for input(s): LACTATE in the last 72 hours. No results for input(s): ESR, CRP in the last 72 hours. No results found for: AEROBOT, ANABOT, LABGRAM, ISO2, ISO3, ISO4, ISO5, ISO6       Xr Chest Standard (2 Vw)    Result Date: 4/29/2020  EXAMINATION: TWO XRAY VIEWS OF THE CHEST 4/29/2020 5:15 pm COMPARISON: 10/23/2019 HISTORY: ORDERING SYSTEM PROVIDED HISTORY: chest pain TECHNOLOGIST PROVIDED HISTORY: Reason for exam:->chest pain Reason for Exam: chest pain Acuity: Chronic Type of Exam: Ongoing FINDINGS: The heart appears mildly enlarged but is similar in size to the prior study. No pleural effusion or pneumothorax is seen. No focal lung consolidation is identified. Median sternotomy wires are noted. No acute cardiopulmonary abnormality identified. Prior TTE: 7/24/19  Conclusions      Summary   LV systolic function is moderately reduced with an EF of 35-40 %. There is more prominent HK of the anteroseptal wall. Left ventricular cavity size is mildly dilated. There is mild hypertrophy of the septum.    Grade I diastolic dysfunction with normal left ventricular

## 2020-04-30 NOTE — PROGRESS NOTES
40 mg Oral Daily    hydrALAZINE  100 mg Oral 3 times per day    insulin lispro  0-12 Units Subcutaneous TID WC    insulin lispro  0-6 Units Subcutaneous Nightly    vitamin B-12  1,000 mcg Oral Daily    therapeutic multivitamin-minerals  1 tablet Oral Daily    rosuvastatin  40 mg Oral QPM    vitamin C  500 mg Oral Daily    vitamin D3  400 Units Oral BID    sodium chloride flush  10 mL Intravenous 2 times per day    ranolazine  500 mg Oral BID     Continuous Infusions:   dextrose      sodium chloride 75 mL/hr at 04/29/20 2054     PRN Meds:.nitroGLYCERIN, glucose, dextrose, glucagon (rDNA), dextrose, sodium chloride flush, acetaminophen **OR** acetaminophen, polyethylene glycol, promethazine **OR** ondansetron    Assessment & Plan:      Chest pain w/ hx CAD (CABG), likely d/t BP and LVEDP from CHF and severe CKD per cardiology  -trend troponin x 2 0.05, 0.06, 0.04, 0.04  -tele monitoring  - cont home meds: 81mg ASA, carvedilol 50 a.m. / 25 nightly  -carb control diet  - on Eliquis  - EKG: t wave flattening in lateral leads - new. - NPO after midnight in case of intervention per cardiology  - last cath 8/2019:   1. Sinus rhythm at baseline. 2.  Mildly prolonged infranodal AV conduction. 3.  Inducible atrial fibrillation. 4.  No inducible ventricular tachycardia despite single, double, and triple VPDs from two right ventricular sites. - echo 7/24/19: EF 35-40%,   -cardiology consulted, no further ischemic workup recommended, unless right heart cath for filling pressures.  No plan at this time - follows with Dr. 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 Rahul Tracy who pt has not seen in >

## 2020-04-30 NOTE — CONSULTS
[address]  1965 April 30, 2020    Reason for Consult: Chest Pain    CC: Chest Pain    HPI:  The patient is 50 y.o. male with a past medical history significant for type 2 DM, PAD with prior SUZANNA stent, CAD s/p CABG, chronic combined systolic and diastolic CHF and CKD stage 4 who presented to [facility] with chest pain. He follows with my partner Dr. Neo Klein at [facility]. He says that he does get angina not infrequently. Terry Masters states that he started to get angina about a week ago with radiation into his neck. He adds that his angina may come on for a few days and then not recur for another week. This most recent episode was occurring every two hours. He took some nitroglycerin for this with relief. He went through a whole bottle actually over a week. He was a little short of breath with these episodes. .     He adds that over the last week he has not been able to breathe with laying flat. He has been propping himself up with pillows and now transitioned to the couch. He is not yet on dialysis and has not had a fistula placed. Review of Systems:  Constitutional: No fatigue, weakness, night sweats or fever. HEENT: No new vision difficulties or ringing in the ears. Respiratory: No new SOB, PND, orthopnea or cough. Cardiovascular: See HPI   GI: No n/v, diarrhea, constipation, abdominal pain or changes in bowel habits. No melena, no hematochezia  : No urinary frequency, urgency, incontinence, hematuria or dysuria. Skin: No cyanosis or skin lesions. Musculoskeletal: No new muscle or joint pain. Neurological: No syncope or TIA-like symptoms.   Psychiatric: No anxiety, insomnia or depression     Past Medical History:   Diagnosis Date    Angina at rest ([address]     Cardiomyopathy ([facility]     Carotid artery stenosis 10/25/2016    SUZANNA stented with 8 x 30 mm non-tapered Xact stent    CHF (congestive heart failure) ([address] 3/3/15    EF 30%     g Oral Daily PRN Cristina Lawson MD        promethazine (PHENERGAN) tablet 12.5 mg  12.5 mg Oral Q6H PRN Cristina Lawson MD        Or    ondansetron [facility]) injection 4 mg  4 mg Intravenous Q6H PRN Cristina Lawson MD        0.9 % sodium chloride infusion   Intravenous Continuous Amr Dianne Moore MD 75 mL/hr at 04/29/20 2054      ranolazine (RANEXA) extended release tablet 500 mg  500 mg Oral BID Rosalinda Delaney, APRN - CNP           Physical Exam:   BP (!) 153/64   Pulse 67   Temp 97.6 °F (36.4 °C) (Oral)   Resp 18   Ht 5' 7.5\" (1.715 m)   Wt 211 lb 13.8 oz (96.1 kg)   SpO2 100%   BMI 32.69 kg/m²     Intake/Output Summary (Last 24 hours) at 4/30/2020 0906  Last data filed at 4/30/2020 8141  Gross per 24 hour   Intake 1195 ml   Output 600 ml   Net 595 ml     Wt Readings from Last 2 Encounters:   04/30/20 211 lb 13.8 oz (96.1 kg)   02/28/20 204 lb (92.5 kg)     Constitutional: He is oriented to person, place, and time. He appears well-developed and well-nourished. In no acute distress. Head: Normocephalic and atraumatic. Neck: Neck supple. No JVD present. Carotid bruit is not present. No mass and no thyromegaly present. No lymphadenopathy present. Cardiovascular: Normal rate, regular rhythm, normal heart sounds and intact distal pulses. Exam reveals no gallop and no friction rub. No murmur heard. Pulmonary/Chest: Effort normal and breath sounds normal. No respiratory distress. He has no wheezes, rhonchi or rales. Abdominal: Soft, non-tender. Bowel sounds and aorta are normal. He exhibits no organomegaly, mass or bruit. Extremities: No edema, cyanosis, or clubbing. Pulses are 2+ radial/carotid/dorsalis pedis and posterior tibial bilaterally. Neurological: He is alert and oriented to person, place, and time. He has normal reflexes. No cranial nerve deficit. Coordination normal.   Skin: Skin is warm and dry. There is no rash or diaphoresis. Psychiatric: He has a normal mood and affect.  His

## 2020-04-30 NOTE — PLAN OF CARE
Problem: Discharge Planning:  Goal: Discharged to appropriate level of care  Description: Discharged to appropriate level of care  Outcome: Ongoing     Problem: Cardiac Output - Decreased:  Goal: Hemodynamic stability will improve  Description: Hemodynamic stability will improve  Outcome: Ongoing     Problem: Pain:  Goal: Pain level will decrease  Description: Pain level will decrease  Outcome: Ongoing  Goal: Control of acute pain  Description: Control of acute pain  Outcome: Ongoing  Goal: Control of chronic pain  Description: Control of chronic pain  Outcome: Ongoing     Problem: Tissue Perfusion - Cardiopulmonary, Altered:  Goal: Absence of angina  Description: Absence of angina  Outcome: Ongoing  Goal: Circulatory function within specified parameters  Description: Circulatory function within specified parameters  Outcome: Ongoing  Goal: Hemodynamic stability will improve  Description: Hemodynamic stability will improve  Outcome: Ongoing     Problem: Pain:  Goal: Pain level will decrease  Description: Pain level will decrease  Outcome: Ongoing  Goal: Control of acute pain  Description: Control of acute pain  Outcome: Ongoing  Goal: Control of chronic pain  Description: Control of chronic pain  Outcome: Ongoing

## 2020-05-01 ENCOUNTER — APPOINTMENT (OUTPATIENT)
Dept: GENERAL RADIOLOGY | Age: 55
DRG: 286 | End: 2020-05-01
Payer: COMMERCIAL

## 2020-05-01 LAB
A/G RATIO: 1.4 (ref 1.1–2.2)
ALBUMIN SERPL-MCNC: 3.7 G/DL (ref 3.4–5)
ALP BLD-CCNC: 94 U/L (ref 40–129)
ALT SERPL-CCNC: 11 U/L (ref 10–40)
AMPHETAMINE SCREEN, URINE: NORMAL
ANION GAP SERPL CALCULATED.3IONS-SCNC: 11 MMOL/L (ref 3–16)
AST SERPL-CCNC: 11 U/L (ref 15–37)
BARBITURATE SCREEN URINE: NORMAL
BENZODIAZEPINE SCREEN, URINE: NORMAL
BILIRUB SERPL-MCNC: <0.2 MG/DL (ref 0–1)
BILIRUBIN URINE: NEGATIVE
BLOOD, URINE: ABNORMAL
BUN BLDV-MCNC: 50 MG/DL (ref 7–20)
CALCIUM SERPL-MCNC: 8.8 MG/DL (ref 8.3–10.6)
CANNABINOID SCREEN URINE: NORMAL
CHLORIDE BLD-SCNC: 100 MMOL/L (ref 99–110)
CHLORIDE URINE RANDOM: 39 MMOL/L
CLARITY: CLEAR
CO2: 25 MMOL/L (ref 21–32)
COCAINE METABOLITE SCREEN URINE: NORMAL
COLOR: YELLOW
CREAT SERPL-MCNC: 6.1 MG/DL (ref 0.9–1.3)
CREATININE URINE: 83.6 MG/DL (ref 39–259)
EOSINOPHIL,URINE: NORMAL
EOSINOPHIL,URINE: NORMAL
EPITHELIAL CELLS, UA: 0 /HPF (ref 0–5)
ESTIMATED AVERAGE GLUCOSE: 174.3 MG/DL
GFR AFRICAN AMERICAN: 12
GFR NON-AFRICAN AMERICAN: 10
GLOBULIN: 2.6 G/DL
GLUCOSE BLD-MCNC: 160 MG/DL (ref 70–99)
GLUCOSE BLD-MCNC: 227 MG/DL (ref 70–99)
GLUCOSE BLD-MCNC: 259 MG/DL (ref 70–99)
GLUCOSE BLD-MCNC: 279 MG/DL (ref 70–99)
GLUCOSE BLD-MCNC: 288 MG/DL (ref 70–99)
GLUCOSE BLD-MCNC: 94 MG/DL (ref 70–99)
GLUCOSE URINE: 250 MG/DL
HAPTOGLOBIN: 37 MG/DL (ref 30–200)
HBA1C MFR BLD: 7.7 %
HIV AG/AB: NORMAL
HIV ANTIGEN: NORMAL
HIV-1 ANTIBODY: NORMAL
HIV-2 AB: NORMAL
HYALINE CASTS: 0 /LPF (ref 0–8)
KETONES, URINE: NEGATIVE MG/DL
LACTATE DEHYDROGENASE: 206 U/L (ref 100–190)
LEUKOCYTE ESTERASE, URINE: NEGATIVE
Lab: NORMAL
MAGNESIUM: 2.3 MG/DL (ref 1.8–2.4)
METHADONE SCREEN, URINE: NORMAL
MICROSCOPIC EXAMINATION: YES
NITRITE, URINE: NEGATIVE
OPIATE SCREEN URINE: NORMAL
OXYCODONE URINE: NORMAL
PARATHYROID HORMONE INTACT: 286.9 PG/ML (ref 14–72)
PERFORMED ON: ABNORMAL
PERFORMED ON: NORMAL
PH UA: 6
PH UA: 6 (ref 5–8)
PHENCYCLIDINE SCREEN URINE: NORMAL
PHOSPHORUS: 3.9 MG/DL (ref 2.5–4.9)
POTASSIUM REFLEX MAGNESIUM: 4.3 MMOL/L (ref 3.5–5.1)
POTASSIUM SERPL-SCNC: 4.3 MMOL/L (ref 3.5–5.1)
POTASSIUM, UR: 33.2 MMOL/L
PROPOXYPHENE SCREEN: NORMAL
PROTEIN PROTEIN: 112 MG/DL
PROTEIN UA: 100 MG/DL
RBC UA: 29 /HPF (ref 0–4)
SODIUM BLD-SCNC: 136 MMOL/L (ref 136–145)
SODIUM URINE: 55 MMOL/L
SPECIFIC GRAVITY UA: 1.01 (ref 1–1.03)
TOTAL CK: 143 U/L (ref 39–308)
TOTAL PROTEIN: 6.3 G/DL (ref 6.4–8.2)
TOTAL SYPHILLIS IGG/IGM: NORMAL
TSH SERPL DL<=0.05 MIU/L-ACNC: 1.6 UIU/ML (ref 0.27–4.2)
UREA NITROGEN, UR: 463 MG/DL (ref 800–1666)
URIC ACID, SERUM: 6.4 MG/DL (ref 3.5–7.2)
URINE TYPE: ABNORMAL
UROBILINOGEN, URINE: 1 E.U./DL
VITAMIN D 25-HYDROXY: 25.4 NG/ML
WBC UA: 0 /HPF (ref 0–5)

## 2020-05-01 PROCEDURE — 84443 ASSAY THYROID STIM HORMONE: CPT

## 2020-05-01 PROCEDURE — 87390 HIV-1 AG IA: CPT

## 2020-05-01 PROCEDURE — 86701 HIV-1ANTIBODY: CPT

## 2020-05-01 PROCEDURE — 71045 X-RAY EXAM CHEST 1 VIEW: CPT

## 2020-05-01 PROCEDURE — 81001 URINALYSIS AUTO W/SCOPE: CPT

## 2020-05-01 PROCEDURE — 36415 COLL VENOUS BLD VENIPUNCTURE: CPT

## 2020-05-01 PROCEDURE — 6370000000 HC RX 637 (ALT 250 FOR IP): Performed by: INTERNAL MEDICINE

## 2020-05-01 PROCEDURE — 83036 HEMOGLOBIN GLYCOSYLATED A1C: CPT

## 2020-05-01 PROCEDURE — 4A033BC MEASUREMENT OF ARTERIAL PRESSURE, CORONARY, PERCUTANEOUS APPROACH: ICD-10-PCS | Performed by: INTERNAL MEDICINE

## 2020-05-01 PROCEDURE — 82550 ASSAY OF CK (CPK): CPT

## 2020-05-01 PROCEDURE — 84540 ASSAY OF URINE/UREA-N: CPT

## 2020-05-01 PROCEDURE — 2500000003 HC RX 250 WO HCPCS

## 2020-05-01 PROCEDURE — 86702 HIV-2 ANTIBODY: CPT

## 2020-05-01 PROCEDURE — 1200000000 HC SEMI PRIVATE

## 2020-05-01 PROCEDURE — C1751 CATH, INF, PER/CENT/MIDLINE: HCPCS

## 2020-05-01 PROCEDURE — 86780 TREPONEMA PALLIDUM: CPT

## 2020-05-01 PROCEDURE — 80307 DRUG TEST PRSMV CHEM ANLYZR: CPT

## 2020-05-01 PROCEDURE — 84300 ASSAY OF URINE SODIUM: CPT

## 2020-05-01 PROCEDURE — 82306 VITAMIN D 25 HYDROXY: CPT

## 2020-05-01 PROCEDURE — 4A023N6 MEASUREMENT OF CARDIAC SAMPLING AND PRESSURE, RIGHT HEART, PERCUTANEOUS APPROACH: ICD-10-PCS | Performed by: INTERNAL MEDICINE

## 2020-05-01 PROCEDURE — 51798 US URINE CAPACITY MEASURE: CPT

## 2020-05-01 PROCEDURE — 84133 ASSAY OF URINE POTASSIUM: CPT

## 2020-05-01 PROCEDURE — 83735 ASSAY OF MAGNESIUM: CPT

## 2020-05-01 PROCEDURE — 84156 ASSAY OF PROTEIN URINE: CPT

## 2020-05-01 PROCEDURE — 80053 COMPREHEN METABOLIC PANEL: CPT

## 2020-05-01 PROCEDURE — 93451 RIGHT HEART CATH: CPT

## 2020-05-01 PROCEDURE — 82436 ASSAY OF URINE CHLORIDE: CPT

## 2020-05-01 PROCEDURE — 2580000003 HC RX 258: Performed by: INTERNAL MEDICINE

## 2020-05-01 PROCEDURE — 6360000002 HC RX W HCPCS

## 2020-05-01 PROCEDURE — 6370000000 HC RX 637 (ALT 250 FOR IP): Performed by: NURSE PRACTITIONER

## 2020-05-01 PROCEDURE — 87205 SMEAR GRAM STAIN: CPT

## 2020-05-01 PROCEDURE — 83970 ASSAY OF PARATHORMONE: CPT

## 2020-05-01 PROCEDURE — 83615 LACTATE (LD) (LDH) ENZYME: CPT

## 2020-05-01 PROCEDURE — 83010 ASSAY OF HAPTOGLOBIN QUANT: CPT

## 2020-05-01 PROCEDURE — 84550 ASSAY OF BLOOD/URIC ACID: CPT

## 2020-05-01 PROCEDURE — 93451 RIGHT HEART CATH: CPT | Performed by: INTERNAL MEDICINE

## 2020-05-01 PROCEDURE — 82570 ASSAY OF URINE CREATININE: CPT

## 2020-05-01 PROCEDURE — C1894 INTRO/SHEATH, NON-LASER: HCPCS

## 2020-05-01 PROCEDURE — 6360000002 HC RX W HCPCS: Performed by: INTERNAL MEDICINE

## 2020-05-01 RX ORDER — ISOSORBIDE MONONITRATE 30 MG/1
30 TABLET, EXTENDED RELEASE ORAL DAILY
Status: DISCONTINUED | OUTPATIENT
Start: 2020-05-01 | End: 2020-05-04 | Stop reason: HOSPADM

## 2020-05-01 RX ORDER — CARVEDILOL 25 MG/1
50 TABLET ORAL 2 TIMES DAILY
Status: DISCONTINUED | OUTPATIENT
Start: 2020-05-01 | End: 2020-05-04 | Stop reason: HOSPADM

## 2020-05-01 RX ORDER — SODIUM CHLORIDE 0.9 % (FLUSH) 0.9 %
10 SYRINGE (ML) INJECTION PRN
Status: DISCONTINUED | OUTPATIENT
Start: 2020-05-01 | End: 2020-05-04 | Stop reason: HOSPADM

## 2020-05-01 RX ORDER — ACETAMINOPHEN 325 MG/1
650 TABLET ORAL EVERY 4 HOURS PRN
Status: DISCONTINUED | OUTPATIENT
Start: 2020-05-01 | End: 2020-05-01 | Stop reason: SDUPTHER

## 2020-05-01 RX ORDER — SODIUM CHLORIDE 0.9 % (FLUSH) 0.9 %
10 SYRINGE (ML) INJECTION EVERY 12 HOURS SCHEDULED
Status: DISCONTINUED | OUTPATIENT
Start: 2020-05-01 | End: 2020-05-01 | Stop reason: SDUPTHER

## 2020-05-01 RX ORDER — SODIUM CHLORIDE 0.9 % (FLUSH) 0.9 %
10 SYRINGE (ML) INJECTION PRN
Status: DISCONTINUED | OUTPATIENT
Start: 2020-05-01 | End: 2020-05-01 | Stop reason: SDUPTHER

## 2020-05-01 RX ORDER — SODIUM CHLORIDE 0.9 % (FLUSH) 0.9 %
10 SYRINGE (ML) INJECTION EVERY 12 HOURS SCHEDULED
Status: DISCONTINUED | OUTPATIENT
Start: 2020-05-01 | End: 2020-05-04 | Stop reason: HOSPADM

## 2020-05-01 RX ORDER — FUROSEMIDE 10 MG/ML
40 INJECTION INTRAMUSCULAR; INTRAVENOUS ONCE
Status: COMPLETED | OUTPATIENT
Start: 2020-05-01 | End: 2020-05-01

## 2020-05-01 RX ADMIN — INSULIN LISPRO 2 UNITS: 100 INJECTION, SOLUTION INTRAVENOUS; SUBCUTANEOUS at 12:20

## 2020-05-01 RX ADMIN — HYDRALAZINE HYDROCHLORIDE 100 MG: 50 TABLET, FILM COATED ORAL at 06:03

## 2020-05-01 RX ADMIN — CARVEDILOL 50 MG: 25 TABLET, FILM COATED ORAL at 20:37

## 2020-05-01 RX ADMIN — CARVEDILOL 50 MG: 25 TABLET, FILM COATED ORAL at 09:59

## 2020-05-01 RX ADMIN — CYANOCOBALAMIN TAB 1000 MCG 1000 MCG: 1000 TAB at 09:58

## 2020-05-01 RX ADMIN — AMLODIPINE BESYLATE 10 MG: 10 TABLET ORAL at 09:58

## 2020-05-01 RX ADMIN — FUROSEMIDE 40 MG: 10 INJECTION, SOLUTION INTRAMUSCULAR; INTRAVENOUS at 17:03

## 2020-05-01 RX ADMIN — OXYCODONE HYDROCHLORIDE AND ACETAMINOPHEN 500 MG: 500 TABLET ORAL at 09:58

## 2020-05-01 RX ADMIN — INSULIN LISPRO 4 UNITS: 100 INJECTION, SOLUTION INTRAVENOUS; SUBCUTANEOUS at 09:57

## 2020-05-01 RX ADMIN — CHOLECALCIFEROL (VITAMIN D3) 10 MCG (400 UNIT) TABLET 400 UNITS: at 20:38

## 2020-05-01 RX ADMIN — TAMSULOSIN HYDROCHLORIDE 0.4 MG: 0.4 CAPSULE ORAL at 20:38

## 2020-05-01 RX ADMIN — LACTULOSE 20 G: 20 SOLUTION ORAL at 13:41

## 2020-05-01 RX ADMIN — CHOLECALCIFEROL (VITAMIN D3) 10 MCG (400 UNIT) TABLET 400 UNITS: at 09:59

## 2020-05-01 RX ADMIN — SODIUM CHLORIDE, PRESERVATIVE FREE 10 ML: 5 INJECTION INTRAVENOUS at 20:38

## 2020-05-01 RX ADMIN — APIXABAN 5 MG: 5 TABLET, FILM COATED ORAL at 20:38

## 2020-05-01 RX ADMIN — ISOSORBIDE MONONITRATE 30 MG: 30 TABLET, EXTENDED RELEASE ORAL at 12:20

## 2020-05-01 RX ADMIN — HYDRALAZINE HYDROCHLORIDE 100 MG: 50 TABLET, FILM COATED ORAL at 20:38

## 2020-05-01 RX ADMIN — LACTULOSE 20 G: 20 SOLUTION ORAL at 09:58

## 2020-05-01 RX ADMIN — HYDRALAZINE HYDROCHLORIDE 100 MG: 50 TABLET, FILM COATED ORAL at 13:41

## 2020-05-01 RX ADMIN — RANOLAZINE 500 MG: 500 TABLET, FILM COATED, EXTENDED RELEASE ORAL at 09:58

## 2020-05-01 RX ADMIN — APIXABAN 5 MG: 5 TABLET, FILM COATED ORAL at 09:59

## 2020-05-01 RX ADMIN — INSULIN LISPRO 3 UNITS: 100 INJECTION, SOLUTION INTRAVENOUS; SUBCUTANEOUS at 20:39

## 2020-05-01 RX ADMIN — ROSUVASTATIN CALCIUM 40 MG: 40 TABLET, FILM COATED ORAL at 20:38

## 2020-05-01 RX ADMIN — ASPIRIN 81 MG 81 MG: 81 TABLET ORAL at 09:59

## 2020-05-01 RX ADMIN — RANOLAZINE 500 MG: 500 TABLET, FILM COATED, EXTENDED RELEASE ORAL at 20:38

## 2020-05-01 RX ADMIN — MULTIPLE VITAMINS W/ MINERALS TAB 1 TABLET: TAB at 09:58

## 2020-05-01 RX ADMIN — SODIUM CHLORIDE, PRESERVATIVE FREE 10 ML: 5 INJECTION INTRAVENOUS at 09:59

## 2020-05-01 RX ADMIN — INSULIN GLARGINE 10 UNITS: 100 INJECTION, SOLUTION SUBCUTANEOUS at 20:38

## 2020-05-01 ASSESSMENT — PAIN SCALES - GENERAL
PAINLEVEL_OUTOF10: 0

## 2020-05-01 NOTE — PROGRESS NOTES
Pt returned from heart cath procedure. Alert and oriented X4, VS stable. Puncture site dressing clean, dry, and intact with no drainage noted. Pt currently laying in supine position with call light in place. Will continue to monitor.  Electronically signed by Adams Grossman RN on 5/1/2020 at 9:36 AM

## 2020-05-01 NOTE — PROGRESS NOTES
Agreeable to further testing. His GF was on speaker phone and both in understand likely chronic changes, HTN related. May need RRT if continues to worsen despite BP control. Repeat urine eos. Denied n/v/d/f/cp.  + LE edema better + SOB improved, still with + Orthopnea/PND. Discussed with: Patient, RN, his GF on phone, Cardiology  Past medical, family, and social histories were reviewed as previously documented. Updates were made as necessary. Review of Systems ROS with pertinent positives and negatives listed in interval history. Medications   Inpatient Meds:  Medications Prior to Admission: BASAGLAR KWIKPEN 100 UNIT/ML injection pen, INJECT UNDER THE SKIN 20 UNITS NIGHTLY  WHEN OFF PUMP  hydrALAZINE (APRESOLINE) 100 MG tablet, TAKE ONE TABLET BY MOUTH THREE TIMES A DAY  carvedilol (COREG) 25 MG tablet, TAKE TWO TABLETS BY MOUTH EVERY MORNING AND TAKE ONE TABLET BY MOUTH EVERY EVENING  ELIQUIS 5 MG TABS tablet, TAKE ONE TABLET BY MOUTH TWICE A DAY  rosuvastatin (CRESTOR) 40 MG tablet, TAKE ONE TABLET BY MOUTH EVERY EVENING  ranolazine (RANEXA) 1000 MG extended release tablet, Take 1 tablet by mouth 2 times daily  furosemide (LASIX) 40 MG tablet, TAKE ONE TABLET BY MOUTH DAILY  diltiazem (CARDIZEM CD) 120 MG extended release capsule, TAKE ONE CAPSULE BY MOUTH DAILY  insulin aspart (NOVOLOG) 100 UNIT/ML injection vial, 20-30 units daily via pump (Patient taking differently: Bases dose off insulin sensitivity and carb ratio for dosing.)  vitamin D3 (CHOLECALCIFEROL) 10 MCG (400 UNIT) TABS tablet, Take 400 Units by mouth 2 times daily  vitamin C (ASCORBIC ACID) 500 MG tablet, Take 500 mg by mouth daily  Methylcobalamin (B-12) 1000 MCG TBDP, Place 1,000 mcg under the tongue daily  aspirin 81 MG tablet, Take 1 tablet by mouth daily  Multiple Vitamins-Minerals (THERAPEUTIC MULTIVITAMIN-MINERALS) tablet, Take 1 tablet by mouth daily.   nitroGLYCERIN (NITROSTAT) 0.4 MG SL tablet, Place 1 tablet under the tongue

## 2020-05-01 NOTE — PROGRESS NOTES
Hospital Medicine Progress Note      Admit Date: 4/29/2020         Overnight Events: none    CC: F/U for chest pain    HPI: The patient is a 55 y. o. male with hx CAD s/p CABG, carotid artery stenosis, type 1 DM, combined systolic/diastolic CHF, CKD stage 2, HTN, HLD, and PVD who presents to [facility] with chest pain. Patient usually has anginal chest pain treated with Ranexa. However, patient states that over the past 7-8 days, he has had intermittent squeezing substernal chest pain with nothing making the pain better and exertion making the pain worse. It radiates to the neck. Because the pain felt different and didn't karlo, he decided to come to the ED for further evaluation. He denies fever, chills, shortness of breath, abdominal pain, nausea, vomiting, constipation, diarrhea, and dysuria.     In the ED, EKG showed T wave flattening in the lateral leads which are new. Initial troponin was 0.05. Serum creatinine was 5.4, pro-BNP of 1,688. CXR showed no acute cardiopulmonary process. Cardiology was consulted from the ED, and they advised admission to the hospital for observation and possible intervention. They did not think that his episode was anginal in nature due to an epicardial stenosis, but instead that his blood pressure and LVEDP were elevated with chronic systolic CHF and severe CKD which could have caused microvascular ischemia. They did not believe he needed a heart catheterization or ischemic workup. He needs a lori discussion about his renal function and fistula placement for dialysis and possibly some diuresis from Nephrology. We will continue his anticoagulation for now for paroxysmal A-fib but can be held for any dialysis access placement. Renal ultrasound shows diffuse mild increased echogenicity compatible with medical renal disease along with simple benign left renal cyst measuring maximally 3.3 cm. Similar to prior study.  Consistent with renal artery stenosis      Interval 2.30 05/01/2020       Lab Results   Component Value Date    ALT 11 05/01/2020    AST 11 (L) 05/01/2020    ALKPHOS 94 05/01/2020    BILITOT <0.2 05/01/2020        No flowsheet data found. Lab Results   Component Value Date    LABA1C 10.3 12/09/2019       Imaging:  XR CHEST 1 VW   Final Result   No acute cardiopulmonary disease. US RENAL COMPLETE   Final Result   Similar appearance to the kidneys sonographically from prior exam 05/07/2018   with diffuse mild increased echogenicity compatible with medical renal   disease along with simple benign left renal cyst measuring maximally 3.3 cm. Both kidneys are diminutive in size, right somewhat more than left. Consider   renal artery stenosis. Significant postvoid residual volume to the urinary bladder as above. Urinary bladder otherwise appears unremarkable. XR CHEST STANDARD (2 VW)   Final Result   No acute cardiopulmonary abnormality identified.              Scheduled and prn Medications:    Scheduled Meds:   sodium chloride flush  10 mL Intravenous 2 times per day    carvedilol  50 mg Oral BID    lactulose  20 g Oral TID    amLODIPine  10 mg Oral Daily    tamsulosin  0.4 mg Oral Nightly    aspirin  81 mg Oral Daily    insulin glargine  10 Units Subcutaneous Nightly    apixaban  5 mg Oral BID    [Held by provider] furosemide  40 mg Oral Daily    hydrALAZINE  100 mg Oral 3 times per day    insulin lispro  0-12 Units Subcutaneous TID WC    insulin lispro  0-6 Units Subcutaneous Nightly    vitamin B-12  1,000 mcg Oral Daily    therapeutic multivitamin-minerals  1 tablet Oral Daily    rosuvastatin  40 mg Oral QPM    vitamin C  500 mg Oral Daily    vitamin D3  400 Units Oral BID    ranolazine  500 mg Oral BID     Continuous Infusions:   dextrose       PRN Meds:.sodium chloride flush, nitroGLYCERIN, glucose, dextrose, glucagon (rDNA), dextrose, acetaminophen **OR** acetaminophen, polyethylene glycol, promethazine **OR** ondansetron    Assessment & Plan:         Chest pain w/ hx CAD (CABG), likely d/t BP and LVEDP from CHF and severe CKD per cardiology  - right heart cath today   -trend troponin x 2 0.05, 0.06, 0.04, 0.04  -tele monitoring  - cont home meds: 81mg ASA, carvedilol 50 a.m. / 25 nightly  -carb control diet  - on Eliquis  - EKG: t wave flattening in lateral leads - new. - NPO after midnight in case of intervention per cardiology  - last cath 8/2019:   1.  Sinus rhythm at baseline. 2. Ramandeep Albers prolonged infranodal AV conduction. 3.  Inducible atrial fibrillation. 4.  No inducible ventricular tachycardia despite single, double, and triple VPDs from two right ventricular sites. - echo 7/24/19: EF 35-40%,   -cardiology following.- follows with Dr. 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 Fabian Moreland who pt has not seen in > 1 yr.       Acute on Chronic combined systolic/diastolic CHF , class 2  - subjective wt gain per patient 216 # up from 198 lbs 2 wks ago, abdominal and feet swelling per pt   -continue home meds   -hold Lasix for now given worsening cr and defer management to cards/nephrology  - cardiology consulted     Type 1 DM   - 114 today; 406 BS in ER- insulin given in ER  -Lantus  -SSI  - A1C> 10  - checking proteinuria  -hypoglycemia protocol  -POCT glucose checks  -carb control diet  -decrease Lantus tonight due to NPO status  - Follows with Dr. Keara Bland     Essential hypertension  -continue home meds: cardizem CD, hydralazine  - ?  Adherence to home meds:  Increased coreg to 25mg bid and started norvasc 10mg daily per Nephrology  - hx missed f/u appts    -

## 2020-05-01 NOTE — PROCEDURES
20 mL. No sedation was given for this procedure. The procedure was performed  on room air. FINDINGS:  1.  Evidence of severe diastolic dysfunction with a pulmonary capillary  wedge pressure of 25. Right atrial pressure is 5 mmHg. 2.  Pulmonary hypertension, likely pulmonary venous hypertension with an  instantaneous pressure of 38/14 for a mean pulmonary arterial pressure  of 27 mmHg. 3.  Preserved cardiac output by thermodilution at 6.05 liters per minute  for a cardiac index of 2.9 liters per kilogram per minute. 4.  Normal mixed venous oxygen saturations in the pulmonary artery at  74%, in the right atrium at 71% on room air.         Isabel Boyle MD    D: 05/01/2020 9:13:42       T: 05/01/2020 9:23:12     [id number]  Job#: [id number]     Doc#: [id number]    CC:  Ileana Sorensen MD

## 2020-05-02 LAB
A/G RATIO: 1.4 (ref 1.1–2.2)
ALBUMIN SERPL-MCNC: 4.2 G/DL (ref 3.4–5)
ALP BLD-CCNC: 96 U/L (ref 40–129)
ALT SERPL-CCNC: 13 U/L (ref 10–40)
AMMONIA: 48 UMOL/L (ref 16–60)
ANION GAP SERPL CALCULATED.3IONS-SCNC: 14 MMOL/L (ref 3–16)
AST SERPL-CCNC: 13 U/L (ref 15–37)
BILIRUB SERPL-MCNC: 0.4 MG/DL (ref 0–1)
BUN BLDV-MCNC: 45 MG/DL (ref 7–20)
CALCIUM SERPL-MCNC: 9 MG/DL (ref 8.3–10.6)
CHLORIDE BLD-SCNC: 101 MMOL/L (ref 99–110)
CO2: 24 MMOL/L (ref 21–32)
CREAT SERPL-MCNC: 5.8 MG/DL (ref 0.9–1.3)
GFR AFRICAN AMERICAN: 12
GFR NON-AFRICAN AMERICAN: 10
GLOBULIN: 3 G/DL
GLUCOSE BLD-MCNC: 111 MG/DL (ref 70–99)
GLUCOSE BLD-MCNC: 265 MG/DL (ref 70–99)
GLUCOSE BLD-MCNC: 351 MG/DL (ref 70–99)
GLUCOSE BLD-MCNC: 80 MG/DL (ref 70–99)
GLUCOSE BLD-MCNC: 81 MG/DL (ref 70–99)
GLUCOSE BLD-MCNC: 98 MG/DL (ref 70–99)
PERFORMED ON: ABNORMAL
PERFORMED ON: NORMAL
PERFORMED ON: NORMAL
POTASSIUM REFLEX MAGNESIUM: 4.1 MMOL/L (ref 3.5–5.1)
SODIUM BLD-SCNC: 139 MMOL/L (ref 136–145)
TOTAL PROTEIN: 7.2 G/DL (ref 6.4–8.2)

## 2020-05-02 PROCEDURE — 84244 ASSAY OF RENIN: CPT

## 2020-05-02 PROCEDURE — 93978 VASCULAR STUDY: CPT

## 2020-05-02 PROCEDURE — 6370000000 HC RX 637 (ALT 250 FOR IP): Performed by: INTERNAL MEDICINE

## 2020-05-02 PROCEDURE — 83835 ASSAY OF METANEPHRINES: CPT

## 2020-05-02 PROCEDURE — 94760 N-INVAS EAR/PLS OXIMETRY 1: CPT

## 2020-05-02 PROCEDURE — 82140 ASSAY OF AMMONIA: CPT

## 2020-05-02 PROCEDURE — 82384 ASSAY THREE CATECHOLAMINES: CPT

## 2020-05-02 PROCEDURE — 99232 SBSQ HOSP IP/OBS MODERATE 35: CPT | Performed by: NURSE PRACTITIONER

## 2020-05-02 PROCEDURE — 36415 COLL VENOUS BLD VENIPUNCTURE: CPT

## 2020-05-02 PROCEDURE — 80053 COMPREHEN METABOLIC PANEL: CPT

## 2020-05-02 PROCEDURE — 1200000000 HC SEMI PRIVATE

## 2020-05-02 PROCEDURE — 2580000003 HC RX 258: Performed by: INTERNAL MEDICINE

## 2020-05-02 PROCEDURE — 82088 ASSAY OF ALDOSTERONE: CPT

## 2020-05-02 PROCEDURE — 6370000000 HC RX 637 (ALT 250 FOR IP): Performed by: NURSE PRACTITIONER

## 2020-05-02 RX ADMIN — SODIUM CHLORIDE, PRESERVATIVE FREE 10 ML: 5 INJECTION INTRAVENOUS at 20:46

## 2020-05-02 RX ADMIN — TAMSULOSIN HYDROCHLORIDE 0.4 MG: 0.4 CAPSULE ORAL at 20:46

## 2020-05-02 RX ADMIN — INSULIN LISPRO 3 UNITS: 100 INJECTION, SOLUTION INTRAVENOUS; SUBCUTANEOUS at 20:47

## 2020-05-02 RX ADMIN — HYDRALAZINE HYDROCHLORIDE 100 MG: 50 TABLET, FILM COATED ORAL at 14:46

## 2020-05-02 RX ADMIN — CYANOCOBALAMIN TAB 1000 MCG 1000 MCG: 1000 TAB at 11:07

## 2020-05-02 RX ADMIN — HYDRALAZINE HYDROCHLORIDE 100 MG: 50 TABLET, FILM COATED ORAL at 20:46

## 2020-05-02 RX ADMIN — ROSUVASTATIN CALCIUM 40 MG: 40 TABLET, FILM COATED ORAL at 20:46

## 2020-05-02 RX ADMIN — CARVEDILOL 50 MG: 25 TABLET, FILM COATED ORAL at 11:06

## 2020-05-02 RX ADMIN — RANOLAZINE 500 MG: 500 TABLET, FILM COATED, EXTENDED RELEASE ORAL at 11:06

## 2020-05-02 RX ADMIN — CARVEDILOL 50 MG: 25 TABLET, FILM COATED ORAL at 20:46

## 2020-05-02 RX ADMIN — RANOLAZINE 500 MG: 500 TABLET, FILM COATED, EXTENDED RELEASE ORAL at 20:46

## 2020-05-02 RX ADMIN — CHOLECALCIFEROL (VITAMIN D3) 10 MCG (400 UNIT) TABLET 400 UNITS: at 11:07

## 2020-05-02 RX ADMIN — HYDRALAZINE HYDROCHLORIDE 100 MG: 50 TABLET, FILM COATED ORAL at 06:04

## 2020-05-02 RX ADMIN — ISOSORBIDE MONONITRATE 30 MG: 30 TABLET, EXTENDED RELEASE ORAL at 11:07

## 2020-05-02 RX ADMIN — ASPIRIN 81 MG 81 MG: 81 TABLET ORAL at 11:06

## 2020-05-02 RX ADMIN — INSULIN LISPRO 10 UNITS: 100 INJECTION, SOLUTION INTRAVENOUS; SUBCUTANEOUS at 18:02

## 2020-05-02 RX ADMIN — AMLODIPINE BESYLATE 10 MG: 10 TABLET ORAL at 11:07

## 2020-05-02 RX ADMIN — APIXABAN 5 MG: 5 TABLET, FILM COATED ORAL at 20:46

## 2020-05-02 RX ADMIN — MULTIPLE VITAMINS W/ MINERALS TAB 1 TABLET: TAB at 11:07

## 2020-05-02 RX ADMIN — CHOLECALCIFEROL (VITAMIN D3) 10 MCG (400 UNIT) TABLET 400 UNITS: at 20:46

## 2020-05-02 RX ADMIN — APIXABAN 5 MG: 5 TABLET, FILM COATED ORAL at 11:07

## 2020-05-02 RX ADMIN — INSULIN GLARGINE 10 UNITS: 100 INJECTION, SOLUTION SUBCUTANEOUS at 20:46

## 2020-05-02 RX ADMIN — OXYCODONE HYDROCHLORIDE AND ACETAMINOPHEN 500 MG: 500 TABLET ORAL at 11:06

## 2020-05-02 NOTE — PROGRESS NOTES
4/30/2020 8:26:56 AM    ECHO: 7/25/2019   Summary   LV systolic function is moderately reduced with an EF of 35-40 %. There is more prominent HK of the anteroseptal wall. Left ventricular cavity size is mildly dilated. There is mild hypertrophy of the septum. Grade I diastolic dysfunction with normal left ventricular filling pressure. Trivial aortic regurgitation. Inadequate tricuspid regurgitation jet to estimate systolic artery pressure   (SPAP). ECHO 5/6/2018:   Summary   -Normal left ventricle size.   -Overall left ventricular function is decreased with an ejection fraction in   the 40% range. There is hypokinesis of the inferior and lateral walls. -Diastolic filling parameters suggest grade I diastolic dysfunction. E/e=16.45.   -Mitral annular calcification is present. -Mild mitral regurgitation. Cardiac Angiography:   Cath 2017 Ramón Ruano):  1.  The left main comes from left coronary cusp; distally it gives rise to   left  circumflex which goes into the OM1.  Rest of the circumflex is occluded.  Left  anterior descending artery is occluded. 2.  Saphenous vein graft to obtuse marginal 2 is patent. 3.  Left internal mammary artery, left anterior descending artery is patent   and  provides blood to the diagonal branches. 4.  Right coronary artery comes from right coronary cusp.  The posterolateral   branch  has 90% stenosis present.  Rest of the vessel is patent. 5.  LVEDP was 15 mmHg.  There was no aortic valve gradient.     SUMMARY:  1.  Patent left internal mammary artery to left anterior descending artery. 2.  Patent saphenous vein graft to obtuse marginal 2.  3.  Non-occluded saphenous vein graft to the obtuse marginal.  4.  Right coronary artery in the posterolateral branch with 90% stenosis   present. 5.  Occluded left anterior descending artery in the proximal segment. 6.  Occluded mid left circumflex.     RECOMMENDATIONS:  1.  Continue postoperative care.   2.  Watch for

## 2020-05-02 NOTE — PROGRESS NOTES
SNF  [] LTAC  [] Long term nursing home or group home [] Transfer to ICU  [] Transfer to PCU [] Other:    Code Status: Full Code    ELOS:home 1-2 d      JAYLEN Nieto CNP  05/02/20

## 2020-05-03 LAB
A/G RATIO: 1.4 (ref 1.1–2.2)
ALBUMIN SERPL-MCNC: 3.8 G/DL (ref 3.4–5)
ALDOSTERONE: 10.3 NG/DL
ALP BLD-CCNC: 91 U/L (ref 40–129)
ALT SERPL-CCNC: 13 U/L (ref 10–40)
ANION GAP SERPL CALCULATED.3IONS-SCNC: 15 MMOL/L (ref 3–16)
AST SERPL-CCNC: 17 U/L (ref 15–37)
BILIRUB SERPL-MCNC: 0.3 MG/DL (ref 0–1)
BUN BLDV-MCNC: 55 MG/DL (ref 7–20)
CALCIUM SERPL-MCNC: 9 MG/DL (ref 8.3–10.6)
CHLORIDE BLD-SCNC: 101 MMOL/L (ref 99–110)
CO2: 23 MMOL/L (ref 21–32)
CREAT SERPL-MCNC: 6.3 MG/DL (ref 0.9–1.3)
GFR AFRICAN AMERICAN: 11
GFR NON-AFRICAN AMERICAN: 9
GLOBULIN: 2.7 G/DL
GLUCOSE BLD-MCNC: 104 MG/DL (ref 70–99)
GLUCOSE BLD-MCNC: 228 MG/DL (ref 70–99)
GLUCOSE BLD-MCNC: 298 MG/DL (ref 70–99)
GLUCOSE BLD-MCNC: 305 MG/DL (ref 70–99)
GLUCOSE BLD-MCNC: 84 MG/DL (ref 70–99)
PERFORMED ON: ABNORMAL
PERFORMED ON: NORMAL
POTASSIUM REFLEX MAGNESIUM: 4.3 MMOL/L (ref 3.5–5.1)
SODIUM BLD-SCNC: 139 MMOL/L (ref 136–145)
TOTAL PROTEIN: 6.5 G/DL (ref 6.4–8.2)

## 2020-05-03 PROCEDURE — 94760 N-INVAS EAR/PLS OXIMETRY 1: CPT

## 2020-05-03 PROCEDURE — 6370000000 HC RX 637 (ALT 250 FOR IP): Performed by: INTERNAL MEDICINE

## 2020-05-03 PROCEDURE — 80053 COMPREHEN METABOLIC PANEL: CPT

## 2020-05-03 PROCEDURE — 6370000000 HC RX 637 (ALT 250 FOR IP): Performed by: NURSE PRACTITIONER

## 2020-05-03 PROCEDURE — 36415 COLL VENOUS BLD VENIPUNCTURE: CPT

## 2020-05-03 PROCEDURE — 99232 SBSQ HOSP IP/OBS MODERATE 35: CPT | Performed by: NURSE PRACTITIONER

## 2020-05-03 PROCEDURE — 2580000003 HC RX 258: Performed by: INTERNAL MEDICINE

## 2020-05-03 PROCEDURE — 1200000000 HC SEMI PRIVATE

## 2020-05-03 RX ADMIN — HYDRALAZINE HYDROCHLORIDE 100 MG: 50 TABLET, FILM COATED ORAL at 22:14

## 2020-05-03 RX ADMIN — CHOLECALCIFEROL (VITAMIN D3) 10 MCG (400 UNIT) TABLET 400 UNITS: at 22:14

## 2020-05-03 RX ADMIN — CARVEDILOL 50 MG: 25 TABLET, FILM COATED ORAL at 22:14

## 2020-05-03 RX ADMIN — NITROGLYCERIN 0.4 MG: 0.4 TABLET, ORALLY DISINTEGRATING SUBLINGUAL at 05:00

## 2020-05-03 RX ADMIN — CHOLECALCIFEROL (VITAMIN D3) 10 MCG (400 UNIT) TABLET 400 UNITS: at 08:58

## 2020-05-03 RX ADMIN — RANOLAZINE 500 MG: 500 TABLET, FILM COATED, EXTENDED RELEASE ORAL at 22:14

## 2020-05-03 RX ADMIN — ISOSORBIDE MONONITRATE 30 MG: 30 TABLET, EXTENDED RELEASE ORAL at 08:58

## 2020-05-03 RX ADMIN — HYDRALAZINE HYDROCHLORIDE 100 MG: 50 TABLET, FILM COATED ORAL at 05:00

## 2020-05-03 RX ADMIN — MULTIPLE VITAMINS W/ MINERALS TAB 1 TABLET: TAB at 08:58

## 2020-05-03 RX ADMIN — INSULIN LISPRO 4 UNITS: 100 INJECTION, SOLUTION INTRAVENOUS; SUBCUTANEOUS at 18:37

## 2020-05-03 RX ADMIN — INSULIN GLARGINE 10 UNITS: 100 INJECTION, SOLUTION SUBCUTANEOUS at 22:14

## 2020-05-03 RX ADMIN — ASPIRIN 81 MG 81 MG: 81 TABLET ORAL at 08:57

## 2020-05-03 RX ADMIN — TAMSULOSIN HYDROCHLORIDE 0.4 MG: 0.4 CAPSULE ORAL at 22:14

## 2020-05-03 RX ADMIN — APIXABAN 5 MG: 5 TABLET, FILM COATED ORAL at 22:13

## 2020-05-03 RX ADMIN — OXYCODONE HYDROCHLORIDE AND ACETAMINOPHEN 500 MG: 500 TABLET ORAL at 08:58

## 2020-05-03 RX ADMIN — ROSUVASTATIN CALCIUM 40 MG: 40 TABLET, FILM COATED ORAL at 22:14

## 2020-05-03 RX ADMIN — INSULIN LISPRO 3 UNITS: 100 INJECTION, SOLUTION INTRAVENOUS; SUBCUTANEOUS at 22:14

## 2020-05-03 RX ADMIN — HYDRALAZINE HYDROCHLORIDE 100 MG: 50 TABLET, FILM COATED ORAL at 15:08

## 2020-05-03 RX ADMIN — CARVEDILOL 50 MG: 25 TABLET, FILM COATED ORAL at 08:58

## 2020-05-03 RX ADMIN — AMLODIPINE BESYLATE 10 MG: 10 TABLET ORAL at 08:57

## 2020-05-03 RX ADMIN — CYANOCOBALAMIN TAB 1000 MCG 1000 MCG: 1000 TAB at 08:58

## 2020-05-03 RX ADMIN — INSULIN LISPRO 8 UNITS: 100 INJECTION, SOLUTION INTRAVENOUS; SUBCUTANEOUS at 12:39

## 2020-05-03 RX ADMIN — APIXABAN 5 MG: 5 TABLET, FILM COATED ORAL at 08:58

## 2020-05-03 RX ADMIN — RANOLAZINE 500 MG: 500 TABLET, FILM COATED, EXTENDED RELEASE ORAL at 08:58

## 2020-05-03 RX ADMIN — SODIUM CHLORIDE, PRESERVATIVE FREE 10 ML: 5 INJECTION INTRAVENOUS at 22:15

## 2020-05-03 ASSESSMENT — PAIN SCALES - GENERAL
PAINLEVEL_OUTOF10: 0
PAINLEVEL_OUTOF10: 0

## 2020-05-03 NOTE — PROGRESS NOTES
Patient rates chest pain 1/10 now, states its more of an aching pain but states Nitro was effective. Will continue to monitor.

## 2020-05-03 NOTE — PROGRESS NOTES
excessive thirst or hunger, hx thyroid disease    Objective:   BP (!) 173/78   Pulse 85   Temp 98 °F (36.7 °C) (Oral)   Resp 18   Ht 5' 7.5\" (1.715 m)   Wt 213 lb 13.5 oz (97 kg)   SpO2 99%   BMI 33.00 kg/m²           Intake/Output Summary (Last 24 hours) at 5/3/2020 0808  Last data filed at 5/3/2020 0500  Gross per 24 hour   Intake 240 ml   Output 1125 ml   Net -885 ml     I/O since adm: -1,850    WEIGHT:Admit Weight: 212 lb 1.3 oz (96.2 kg)         Today  Weight: 213 lb 13.5 oz (97 kg)   DRY WEIGHT: 200 lBS  Wt Readings from Last 3 Encounters:   05/03/20 213 lb 13.5 oz (97 kg)   02/28/20 204 lb (92.5 kg)   12/30/19 206 lb 3.2 oz (93.5 kg)       Physical Exam:  GEN: Appears well, no acute distress  SKIN: Pink, warm, dry. Nails without clubbing. HEENT: PERRLA. Normocephalic, atraumatic. Neck supple. No adenopathy. LUNG: AP diameter normal. Clear bilateral. No wheeze, rales, or ronchi. Respiratory effort normal.  HEART: S1S2 A/R. No JVD. No carotid bruit. No murmur, rub or gallop. ABD: Soft, nontender. +BS X 4 quads. No hepatomegaly. EXT: Radial and pedal pulses 2+ and symmetric. Without varicosities. No edema. MUSCSKEL: Good ROM X4 extremities. No deformity. NEURO: A/O X3. Calm and cooperative.      Telemetry: NSR w/ PVCs HR 90    Medications:    sodium chloride flush  10 mL Intravenous 2 times per day    carvedilol  50 mg Oral BID    isosorbide mononitrate  30 mg Oral Daily    amLODIPine  10 mg Oral Daily    tamsulosin  0.4 mg Oral Nightly    aspirin  81 mg Oral Daily    insulin glargine  10 Units Subcutaneous Nightly    apixaban  5 mg Oral BID    [Held by provider] furosemide  40 mg Oral Daily    hydrALAZINE  100 mg Oral 3 times per day    insulin lispro  0-12 Units Subcutaneous TID WC    insulin lispro  0-6 Units Subcutaneous Nightly    vitamin B-12  1,000 mcg Oral Daily    therapeutic multivitamin-minerals  1 tablet Oral Daily    rosuvastatin  40 mg Oral QPM    vitamin C  500 mg bleeding. 3.  Aggressive risk factor modification. 4.  Continue aggressive antianginal therapy. 5.  If the patient remains symptomatic, we can consider intervention of right  posterolateral branch.     Right heart cath 5/1/2020  FINDINGS:  1.  Evidence of severe diastolic dysfunction with a pulmonary capillary  wedge pressure of 25. Right atrial pressure is 5 mmHg. 2.  Pulmonary hypertension, likely pulmonary venous hypertension with an  instantaneous pressure of 38/14 for a mean pulmonary arterial pressure  of 27 mmHg. 3.  Preserved cardiac output by thermodilution at 6.05 liters per minute  for a cardiac index of 2.9 liters per kilogram per minute. 4.  Normal mixed venous oxygen saturations in the pulmonary artery at  74%, in the right atrium at 71% on room air. Summary   -Normal left ventricle size.   -Overall left ventricular function is decreased with an ejection fraction in   the 40% range. There is hypokinesis of the inferior and lateral walls. -Diastolic filling parameters suggest grade I diastolic dysfunction. E/e=16.45.   -Mitral annular calcification is present. -Mild mitral regurgitation. EP Study for syncope: 7/30/2019  FINDINGS:  1. Sinus rhythm at baseline. 2.  Mildly prolonged infranodal AV conduction. 3.  Inducible atrial fibrillation. 4.  No inducible ventricular tachycardia despite single, double, and  triple VPDs from two right ventricular sites. CXR 5/1/ 2020  FINDINGS:   Sternotomy wires and mediastinal clips are present.  The cardiac silhouette   is within normal limits.  Pulmonary vessels are normal in caliber.  Hilar   contours are normal.  There is no focal airspace disease, pleural effusion or   pneumothorax identified.           Impression   No acute cardiopulmonary disease. Assessment/Plan:    1.) CAD: S/P CABG 2001. Chest pain is not consistent with angina/ischemia. Troponin elevation flat. Continue GDMT.    DAPT: aspirin  Beta Blocker: coreg  ACEi/ARB:

## 2020-05-03 NOTE — PROGRESS NOTES
Patient c/o 4/10 chest pain. BP noted to be elevated at 173/78, 6am dose of Hydralazine given and PRN dose of Nitro SL. Will continue to monitor.

## 2020-05-03 NOTE — PROGRESS NOTES
pump settings adjusted for the additional glucose load. Because he will need close follow-up, he will need to follow-up with Dr. Mehdi Vieyra in the next 2 weeks and then he can follow-up with me afterwards. After discharge, he will also need dialysis education and Access Center referral.     I think he can be discharged on Monday if renal duplex scan is negative.

## 2020-05-03 NOTE — PROGRESS NOTES
Hospital Medicine Progress Note      Admit Date: 4/29/2020         Overnight Events: none    CC: F/U for chest pain    HPI: The patient is a 55 y. o. male with hx CAD s/p CABG, carotid artery stenosis, type 1 DM, combined systolic/diastolic CHF, CKD stage 2, HTN, HLD, and PVD who presents to [facility] with chest pain. Patient usually has anginal chest pain treated with Ranexa. However, patient states that over the past 7-8 days, he has had intermittent squeezing substernal chest pain with nothing making the pain better and exertion making the pain worse. It radiates to the neck. Because the pain felt different and didn't karlo, he decided to come to the ED for further evaluation. He denies fever, chills, shortness of breath, abdominal pain, nausea, vomiting, constipation, diarrhea, and dysuria.     In the ED, EKG showed T wave flattening in the lateral leads which are new. Initial troponin was 0.05. Serum creatinine was 5.4, pro-BNP of 1,688. CXR showed no acute cardiopulmonary process. Cardiology was consulted from the ED, and they advised admission to the hospital for observation and possible intervention. They did not think that his episode was anginal in nature due to an epicardial stenosis, but instead that his blood pressure and LVEDP were elevated with chronic systolic CHF and severe CKD which could have caused microvascular ischemia. They did not believe he needed a heart catheterization or ischemic workup. He needs a lori discussion about his renal function and fistula placement for dialysis and possibly some diuresis from Nephrology. We will continue his anticoagulation for now for paroxysmal A-fib but can be held for any dialysis access placement. Renal ultrasound shows diffuse mild increased echogenicity compatible with medical renal disease along with simple benign left renal cyst measuring maximally 3.3 cm. Similar to prior study.  Consistent with renal artery stenosis  5/1/2020: s/p jugular venous distention. Trachea midline. Respiratory:  Normal respiratory effort. Clear to auscultation, bilaterally without Rales/Wheezes/Rhonchi. Cardiovascular:  Regular rate and rhythm without murmurs, rubs or gallops. Abdomen: Soft, non-tender, non-distended, without rebound or guarding. Normal bowel sounds. Musculoskeletal:LE edema bilat +2,  No clubbing, cyanosis  Full range of motion without deformity. Skin: Skin color, texture, turgor normal.  No rashes or lesions. Neurologic:  Neurovascularly intact without any focal sensory/motor deficits. Cranial nerves: II-XII intact, grossly intact. No facial asymmetry, tongue midline. Psychiatric:  Alert and oriented, thought content appropriate  Capillary Refill: Brisk,< 3 seconds   Peripheral Pulses: +2 palpable, equal bilaterally   LABS:    Lab Results   Component Value Date    WBC 5.2 04/30/2020    HGB 15.1 04/30/2020    HCT 46.8 04/30/2020    MCV 90.4 04/30/2020     (L) 04/30/2020    LYMPHOPCT 24.8 04/29/2020    RBC 5.17 04/30/2020    MCH 29.2 04/30/2020    MCHC 32.3 04/30/2020    RDW 14.6 04/30/2020       Lab Results   Component Value Date    CREATININE 6.3 (HH) 05/03/2020    BUN 55 (H) 05/03/2020     05/03/2020    K 4.3 05/03/2020     05/03/2020    CO2 23 05/03/2020       Lab Results   Component Value Date    MG 2.30 05/01/2020       Lab Results   Component Value Date    ALT 13 05/03/2020    AST 17 05/03/2020    ALKPHOS 91 05/03/2020    BILITOT 0.3 05/03/2020        No flowsheet data found. Lab Results   Component Value Date    LABA1C 7.7 05/01/2020       Imaging:  VL Renal Arterial Duplex Complete   Final Result      XR CHEST 1 VW   Final Result   No acute cardiopulmonary disease.          US RENAL COMPLETE   Final Result   Similar appearance to the kidneys sonographically from prior exam 05/07/2018   with diffuse mild increased echogenicity compatible with medical renal   disease along with simple benign left renal cyst

## 2020-05-03 NOTE — PLAN OF CARE
Problem: Discharge Planning:  Goal: Discharged to appropriate level of care  Description: Discharged to appropriate level of care  5/3/2020 0125 by Hong Gordon RN  Outcome: Ongoing     Problem: Pain:  Goal: Pain level will decrease  Description: Pain level will decrease  5/3/2020 0125 by Hong Gordon RN  Outcome: Ongoing     Problem: Tissue Perfusion - Cardiopulmonary, Altered:  Goal: Circulatory function within specified parameters  Description: Circulatory function within specified parameters  5/3/2020 0125 by Hong Gordon RN  Outcome: Ongoing     Problem: Pain:  Goal: Pain level will decrease  Description: Pain level will decrease  5/3/2020 0125 by Hong Gordon RN  Outcome: Ongoing

## 2020-05-04 VITALS
HEIGHT: 68 IN | HEART RATE: 86 BPM | TEMPERATURE: 98.4 F | SYSTOLIC BLOOD PRESSURE: 151 MMHG | RESPIRATION RATE: 20 BRPM | OXYGEN SATURATION: 98 % | DIASTOLIC BLOOD PRESSURE: 78 MMHG | WEIGHT: 207.23 LBS | BODY MASS INDEX: 31.41 KG/M2

## 2020-05-04 LAB
A/G RATIO: 1.2 (ref 1.1–2.2)
ALBUMIN SERPL-MCNC: 3.8 G/DL (ref 3.4–5)
ALP BLD-CCNC: 92 U/L (ref 40–129)
ALT SERPL-CCNC: 13 U/L (ref 10–40)
ANION GAP SERPL CALCULATED.3IONS-SCNC: 16 MMOL/L (ref 3–16)
AST SERPL-CCNC: 16 U/L (ref 15–37)
BILIRUB SERPL-MCNC: 0.3 MG/DL (ref 0–1)
BUN BLDV-MCNC: 55 MG/DL (ref 7–20)
CALCIUM SERPL-MCNC: 9.1 MG/DL (ref 8.3–10.6)
CHLORIDE BLD-SCNC: 102 MMOL/L (ref 99–110)
CO2: 22 MMOL/L (ref 21–32)
CREAT SERPL-MCNC: 5.8 MG/DL (ref 0.9–1.3)
GFR AFRICAN AMERICAN: 12
GFR NON-AFRICAN AMERICAN: 10
GLOBULIN: 3.2 G/DL
GLUCOSE BLD-MCNC: 292 MG/DL (ref 70–99)
GLUCOSE BLD-MCNC: 59 MG/DL (ref 70–99)
GLUCOSE BLD-MCNC: 74 MG/DL (ref 70–99)
GLUCOSE BLD-MCNC: 84 MG/DL (ref 70–99)
PERFORMED ON: ABNORMAL
PERFORMED ON: ABNORMAL
PERFORMED ON: NORMAL
POTASSIUM REFLEX MAGNESIUM: 4.3 MMOL/L (ref 3.5–5.1)
RENIN ACTIVITY: 2.3 NG/ML/HR
SODIUM BLD-SCNC: 140 MMOL/L (ref 136–145)
TOTAL PROTEIN: 7 G/DL (ref 6.4–8.2)

## 2020-05-04 PROCEDURE — 6370000000 HC RX 637 (ALT 250 FOR IP): Performed by: INTERNAL MEDICINE

## 2020-05-04 PROCEDURE — 80053 COMPREHEN METABOLIC PANEL: CPT

## 2020-05-04 PROCEDURE — 36415 COLL VENOUS BLD VENIPUNCTURE: CPT

## 2020-05-04 PROCEDURE — 6370000000 HC RX 637 (ALT 250 FOR IP): Performed by: NURSE PRACTITIONER

## 2020-05-04 RX ORDER — TAMSULOSIN HYDROCHLORIDE 0.4 MG/1
0.4 CAPSULE ORAL NIGHTLY
Qty: 30 CAPSULE | Refills: 3 | Status: SHIPPED | OUTPATIENT
Start: 2020-05-04 | End: 2020-06-11

## 2020-05-04 RX ORDER — TORSEMIDE 10 MG/1
10 TABLET ORAL 2 TIMES DAILY
Qty: 30 TABLET | Refills: 3 | Status: SHIPPED | OUTPATIENT
Start: 2020-05-04 | End: 2020-06-11

## 2020-05-04 RX ORDER — TORSEMIDE 20 MG/1
10 TABLET ORAL 2 TIMES DAILY
Status: DISCONTINUED | OUTPATIENT
Start: 2020-05-04 | End: 2020-05-04 | Stop reason: HOSPADM

## 2020-05-04 RX ORDER — ISOSORBIDE MONONITRATE 30 MG/1
30 TABLET, EXTENDED RELEASE ORAL DAILY
Qty: 30 TABLET | Refills: 0 | Status: SHIPPED | OUTPATIENT
Start: 2020-05-04 | End: 2020-11-06 | Stop reason: SDUPTHER

## 2020-05-04 RX ADMIN — OXYCODONE HYDROCHLORIDE AND ACETAMINOPHEN 500 MG: 500 TABLET ORAL at 09:08

## 2020-05-04 RX ADMIN — ISOSORBIDE MONONITRATE 30 MG: 30 TABLET, EXTENDED RELEASE ORAL at 09:08

## 2020-05-04 RX ADMIN — MULTIPLE VITAMINS W/ MINERALS TAB 1 TABLET: TAB at 09:08

## 2020-05-04 RX ADMIN — HYDRALAZINE HYDROCHLORIDE 100 MG: 50 TABLET, FILM COATED ORAL at 14:36

## 2020-05-04 RX ADMIN — CYANOCOBALAMIN TAB 1000 MCG 1000 MCG: 1000 TAB at 09:08

## 2020-05-04 RX ADMIN — ASPIRIN 81 MG 81 MG: 81 TABLET ORAL at 09:07

## 2020-05-04 RX ADMIN — RANOLAZINE 500 MG: 500 TABLET, FILM COATED, EXTENDED RELEASE ORAL at 09:08

## 2020-05-04 RX ADMIN — APIXABAN 5 MG: 5 TABLET, FILM COATED ORAL at 09:08

## 2020-05-04 RX ADMIN — CARVEDILOL 50 MG: 25 TABLET, FILM COATED ORAL at 09:07

## 2020-05-04 RX ADMIN — TORSEMIDE 10 MG: 20 TABLET ORAL at 12:16

## 2020-05-04 RX ADMIN — INSULIN LISPRO 6 UNITS: 100 INJECTION, SOLUTION INTRAVENOUS; SUBCUTANEOUS at 12:13

## 2020-05-04 RX ADMIN — HYDRALAZINE HYDROCHLORIDE 100 MG: 50 TABLET, FILM COATED ORAL at 05:41

## 2020-05-04 RX ADMIN — AMLODIPINE BESYLATE 10 MG: 10 TABLET ORAL at 09:07

## 2020-05-04 RX ADMIN — CHOLECALCIFEROL (VITAMIN D3) 10 MCG (400 UNIT) TABLET 400 UNITS: at 09:07

## 2020-05-04 ASSESSMENT — PAIN SCALES - GENERAL: PAINLEVEL_OUTOF10: 0

## 2020-05-04 NOTE — PROGRESS NOTES
Pt discharge to home at 1600. Pt self ambulated to lobby to be picked up by child. Pt has received discharge paperwork.     Electronically signed by Carlita Oneal RN on 5/4/2020 at 4:47 PM

## 2020-05-04 NOTE — DISCHARGE SUMMARY
heart catheterization or ischemic workup.   He needs a lori discussion about his renal function and fistula placement for dialysis and possibly some diuresis from Nephrology. Marie Rivero will continue his anticoagulation for now for paroxysmal A-fib but can be held for any dialysis access placement. Renal ultrasound shows diffuse mild increased echogenicity compatible with medical renal disease along with simple benign left renal cyst measuring maximally 3.3 cm. Similar to prior study. Consistent with renal artery stenosis  5/1/2020: s/p RHC   Chest pain w/ hx CAD (CABG), likely d/t BP and LVEDP from CHF and severe CKD per cardiology  -s/p RHC 5/1/2020: c/w dCHF with EF 35-40%  -trop flat-0.05, 0.06, 0.04, 0.04  -tele monitoring  - cont home meds: 81mg ASA, carvedilol 50 a.m. / 25 nightly  - on Eliquis  - EKG: t wave flattening in lateral leads - new.    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 Neumann who pt has not seen in > 1 yr. Per nephrology, consider d/c tomorrow.  He will need to demonstrate compliance prior to being referred for transplant.     -start torsemide    Acute on Chronic combined systolic/diastolic CHF , class 2  - subjective wt gain per patient 216 # up from 198 lbs 2 wks ago, abdominal and feet swelling per pt   -hold Lasix x1 IV, 5/1/2020 , nephrology managing     Discharge Medications:   Current Discharge Medication List      START taking these medications    Details   torsemide (DEMADEX) 10 MG tablet Take 1 tablet by mouth 2 times daily  Qty: 30 tablet, Refills: 3      tamsulosin (FLOMAX) 0.4 MG capsule Take injection 1mg Im as needed  Qty: 1 mg, Refills: 3      !! blood glucose test strips (ACCU-CHEK AIMEE) strip 6 times daily. Qty: 200 strip, Refills: 6      !! Aimsco Ultra Thin Lancets MISC 6 times a day  Qty: 200 each, Refills: 3      Blood Glucose Monitoring Suppl (ACCU-CHEK AIMEE) BEHZAD As needed  Qty: 1 Device, Refills: 0      !! SOFT TOUCH LANCETS MISC 6 times a day  Qty: 200 each, Refills: 1      !! blood glucose test strips (ONETOUCH VERIO) strip 1 each by In Vitro route daily 6-7 times a day As needed. Qty: 200 each, Refills: 3      Continuous Blood Gluc  (FREESTYLE SAL READER) BEHZAD As needed  Qty: 1 Device, Refills: 0      Continuous Blood Gluc Sensor ([address] [place] [address]) MISC Every 10 days  Qty: 3 each, Refills: 2       !! - Potential duplicate medications found. Please discuss with provider. Current Discharge Medication List      STOP taking these medications       furosemide (LASIX) 40 MG tablet Comments:   Reason for Stopping:         isosorbide mononitrate (IMDUR) 30 MG extended release tablet Comments:   Reason for Stopping:         Elastic Bandages & Supports ([address]) MISC Comments:   Reason for Stopping:         Blood Glucose Monitoring Suppl (EASY TOUCH GLUCOSE SYSTEM) w/Device KIT Comments:   Reason for Stopping:               Discharge ROS:  A complete review of systems was asked and negative except for cardiac     Discharge Exam:    BP (!) 151/78   Pulse 86   Temp 98.4 °F (36.9 °C) (Oral)   Resp 20   Ht 5' 7.5\" (1.715 m)   Wt 213 lb 13.5 oz (97 kg)   SpO2 98%   BMI 33.00 kg/m²   General appearance:   No apparent distress, appears stated age. Cooperative. HEENT:  Normocephalic, atraumatic. PERRLA.  EOMi.  Conjunctivae/corneas clear, no icterus, non-injected. Neck: Supple, with full range of motion. No jugular venous distention. Trachea midline. Respiratory:  Normal respiratory effort.  Clear to auscultation, bilaterally without !          !CKD CAD CHF          ! +------------------+----------+---------------------+ ! Previous Procedure!03/18/2015! Rt: 80-99%, lt: 1-15%! +------------------+----------+---------------------+      EKG     Sinus rhythm with occasional Premature ventricular complexesPossible Left atrial enlargementNonspecific T wave abnormalityProlonged QTConfirmed by REX VILLAGOMEZ, Trina Thornton ([id number]) on 4/30/2020 8:26:56 AM        The patient was seen and examined on day of discharge and this discharge summary is in conjunction with any daily progress note from day of discharge. Time Spent on discharge is 45 minutes  in the examination, evaluation, counseling and review of medications and discharge plan. Note that less than 30 minutes was spent in preparing discharge papers, discussing discharge with patient, medication review, etc.       Signed:    JAYLEN Victoria CNP   5/4/2020      Thank you No primary care provider on file. for the opportunity to be involved in this patient's care.  If you have any questions or concerns please feel free to contact me at [phone]

## 2020-05-04 NOTE — PROGRESS NOTES
chronic changes, HTN related. May need RRT if continues to worsen despite BP control. Repeat urine eos. HPI:  Breathing comfortably. No CP.    ROS:  In bed. [address]:  medications reviewed. Vital Signs / Exam     BP (!) 151/78   Pulse 86   Temp 98.4 °F (36.9 °C) (Oral)   Resp 20   Ht 5' 7.5\" (1.715 m)   Wt 213 lb 13.5 oz (97 kg)   SpO2 98%   BMI 33.00 kg/m²     Wt Readings from Last 5 Encounters:   05/03/20 213 lb 13.5 oz (97 kg)   02/28/20 204 lb (92.5 kg)   12/30/19 206 lb 3.2 oz (93.5 kg)   12/09/19 207 lb (93.9 kg)   10/23/19 197 lb 15.6 oz (89.8 kg)       I/O last 3 completed shifts: In: 0 [P.O.:880]  Out: 36 [Urine:925]    Appearance Appearing comfortable,  Appears stated age. CV  deferred  Respiratory deferred  MSK  deferred  Chest  deferred  Skin  No rashes seen, skin feels warm and dry    Laboratory / Diagnostic Data     No results for input(s): WBC, HGB, HCT, MCV, PLT, DIFF in the last 72 hours. Recent Labs     05/02/20  0723 05/03/20  0603 05/04/20  0615    139 140   K 4.1 4.3 4.3    101 102   CO2 24 23 22   BUN 45* 55* 55*   CREATININE 5.8* 6.3* 5.8*   GLUCOSE 80 104* 74   CALCIUM 9.0 9.0 9.1   ANIONGAP 14 15 16     Estimated Creatinine Clearance: 17 mL/min (A) (based on SCr of 5.8 mg/dL (HH)). Lab Results   Component Value Date    .9 (H) 05/01/2020    CALCIUM 9.1 05/04/2020    CAION 1.06 (L) 10/05/2015    PHOS 3.9 05/01/2020      No results for input(s): INR, PROTIME, PTT in the last 72 hours.     Lab Results   Component Value Date    IRON 69 03/13/2015    TIBC 262 03/13/2015    FERRITIN 60 03/13/2015     Lab Results   Component Value Date    OXHHOHNZ74 761 03/13/2015     No results found for: RETICCTPCT  No results found for: RETICCTPCT    Lab Results   Component Value Date    COLORU YELLOW 05/01/2020    CLARITYU Clear 05/01/2020    PHUR 6.0 05/01/2020    PHUR 6.0 05/01/2020    GLUCOSEU 250 (A) 05/01/2020    BLOODU SMALL (A) 05/01/2020    LEUKOCYTESUR Negative pressure   (SPAP). Assessment & Plan  # Non oliguric STEPH likely due to ? HTN emergency or progression of CKD  - Renal stressors: SBP 200s on admission, uncontrolled DM1, diuretics at home, appears dry  - Baseline Scr 4.5   - Admission Scr 5.4 4/29/2020  - renal function essentially stable/poor   - Work up to date:  (4/30/20) R kidney 7.0 cm , L 8.3 cm hyperechoic, simple cysts left side. Prevoid 794, postvoid 205. (5/1/20) UA [place] without hematuria, UPC 1.3 GM, Eos ? occasional, CK neg,   -  ml    # CKD 4/5; In setting of DN/HTN uncontrolled for a long time, non adherence to follow up. Follows with Dr. Sarai Iyer has not seen her in > 1 year. Baseline 4.5 (2019). - suspect HTN related    # HTN; uncontrolled. ? Adherence with home meds  - (4/30/20) Increase Coreg to 25 mg BID, started Norvasc 10 mg daily  - (5/1/20) Tox screen, LDH, Hapto neg, started on Hydralazine, add Imdur 30 mg  - + Symptoms of KATIE; no previous testing, advised testing once COVID subsides  - renal duplex was inadequate study due to bowel gas  - plasma catecholamines pending    # DM1; c/b PDR Neuropathy/Nephropathy;   - A1C > 10  - (5/1/20) UPC 1.3 gm  - Close f/u with his Endocrinologist Dr. Dougie Garzon    # NSTEMI; ? Type 2 demand with HTN; Trops flat  - (5/1/20) RHC PCWP 25, RA pressure 5 weight 212 LB/96.4 kg    # ACDSHF  - Compression hose  - weight is maybe trending up  - I would observe him off diuretics for now as I think he is motivated to care for himself and has good support  - Will discharge him with torsemide 10 mg PO bid to be titrated by the patient to keep weight stable. Patient can be discharged. I will arrange for f/u with Dr. Sarai Iyer in < 1 week, dialysis education via telehealth, and tagga appointment for PD catheter placement done in parallel.

## 2020-05-04 NOTE — PLAN OF CARE
Problem: Discharge Planning:  Goal: Discharged to appropriate level of care  Description: Discharged to appropriate level of care  Outcome: Ongoing     Problem: Cardiac Output - Decreased:  Goal: Hemodynamic stability will improve  Description: Hemodynamic stability will improve  Outcome: Ongoing     Problem: Tissue Perfusion - Cardiopulmonary, Altered:  Goal: Absence of angina  Description: Absence of angina  Outcome: Ongoing   Monitor labs as ordered. Monitor I/O. Medications as ordered. CLARIBEL hose as ordered. NTG prn.  Problem: Pain:  Goal: Pain level will decrease  Description: Pain level will decrease  Outcome: Ongoing   Pain/discomfort being managed with PRN analgesics per MD orders. Pt able to express presence and absence of pain and rate pain appropriately using numerical scale.

## 2020-05-04 NOTE — PROGRESS NOTES
This RN prepared pt for discharge: discussed medication regimen, f/u care, home care. LDAs not present at discharge. Patient weight at discharge was collected at this time. Pt wgt: 94.0kg .     Electronically signed by Nahomi Starr RN on 5/4/2020 at 1:34 PM

## 2020-05-05 ENCOUNTER — CARE COORDINATION (OUTPATIENT)
Dept: CASE MANAGEMENT | Age: 55
End: 2020-05-05

## 2020-05-05 LAB
METANEPH/PLASMA INTERP: ABNORMAL
METANEPHRINE FREE PLASMA: 0.17 NMOL/L (ref 0–0.49)
NORMETANEPHRINE FREE PLASMA: 2.37 NMOL/L (ref 0–0.89)

## 2020-05-05 RX ORDER — AMLODIPINE BESYLATE 10 MG/1
10 TABLET ORAL DAILY
Qty: 30 TABLET | Refills: 0 | Status: SHIPPED | OUTPATIENT
Start: 2020-05-05 | End: 2020-05-11 | Stop reason: DRUGHIGH

## 2020-05-07 ENCOUNTER — FOLLOWUP TELEPHONE ENCOUNTER (OUTPATIENT)
Dept: INPATIENT UNIT | Age: 55
End: 2020-05-07

## 2020-05-07 LAB
CATECHOLAMINE INTERPRETATION, PLASMA: ABNORMAL
DOPAMINE PLASMA: 203 PG/ML (ref 0–20)
EPINEPHRINE PLASMA: 32 PG/ML (ref 10–200)
NOREPINEPHRINE: 3486 PG/ML (ref 80–520)

## 2020-05-11 ENCOUNTER — OFFICE VISIT (OUTPATIENT)
Dept: CARDIOLOGY CLINIC | Age: 55
End: 2020-05-11
Payer: COMMERCIAL

## 2020-05-11 VITALS
WEIGHT: 208 LBS | SYSTOLIC BLOOD PRESSURE: 116 MMHG | OXYGEN SATURATION: 99 % | HEART RATE: 78 BPM | DIASTOLIC BLOOD PRESSURE: 52 MMHG | HEIGHT: 68 IN | BODY MASS INDEX: 31.52 KG/M2

## 2020-05-11 PROCEDURE — 99214 OFFICE O/P EST MOD 30 MIN: CPT | Performed by: NURSE PRACTITIONER

## 2020-05-11 RX ORDER — ROSUVASTATIN CALCIUM 40 MG/1
TABLET, COATED ORAL
Qty: 90 TABLET | Refills: 0 | Status: SHIPPED | OUTPATIENT
Start: 2020-05-11 | End: 2021-03-01 | Stop reason: ALTCHOICE

## 2020-05-11 RX ORDER — ROSUVASTATIN CALCIUM 40 MG/1
TABLET, COATED ORAL
Qty: 90 TABLET | Refills: 3 | Status: CANCELLED | OUTPATIENT
Start: 2020-05-11

## 2020-05-11 RX ORDER — AMLODIPINE BESYLATE 10 MG/1
5 TABLET ORAL 2 TIMES DAILY
Qty: 30 TABLET | Refills: 0 | Status: SHIPPED
Start: 2020-05-11 | End: 2020-06-11

## 2020-05-11 NOTE — PATIENT INSTRUCTIONS
Divide norvasc / amlodipine : take 1/2 tablet (5mg) in the morning and the second 1/2 tablet midday with your second dose of hydralazine    Referral to pul : sleep clinic    appt in 4-6 weeks

## 2020-05-11 NOTE — PROGRESS NOTES
[facility]     Outpatient Follow Up Note    CHIEF COMPLAINT / HPI: Hospital Follow Up secondary to Acute on chronic combined diastolic and systolic heart failure, EF 35-40%: Most likely R/T hypertensive urgency    Hospital record has been reviewed  Hospital Course progressed as follows per discharge summary:   50 y. o. male with hx CAD s/p CABG, carotid artery stenosis, type 1 DM, combined systolic/diastolic CHF, CKD stage 2, HTN, HLD, and PVD who presents to [facility] with chest pain. Patient usually has anginal chest pain treated with Ranexa. However, patient states that over the past 7-8 days, he has had intermittent squeezing substernal chest pain with nothing making the pain better and exertion making the pain worse. It radiates to the neck. Because the pain felt different and didn't karlo, he decided to come to the ED for further evaluation. He denies fever, chills, shortness of breath, abdominal pain, nausea, vomiting, constipation, diarrhea, and dysuria.     In the ED, EKG showed T wave flattening in the lateral leads which are new. Initial troponin was 0.05. Serum creatinine was 5.4, pro-BNP of 1,688. CXR showed no acute cardiopulmonary process. Cardiology was consulted from the ED, and they advised admission to the hospital for observation and possible intervention. They did not think that his episode was anginal in nature due to an epicardial stenosis, but instead that his blood pressure and LVEDP were elevated with chronic systolic CHF and severe CKD which could have caused microvascular ischemia. Marty Mitchell did not believe he needed a heart catheterization or ischemic workup.   He needs a lori discussion about his renal function and fistula placement for dialysis and possibly some diuresis from Nephrology. Bart Ngo will continue his anticoagulation for now for paroxysmal A-fib but can be held for any dialysis access placement.  Renal ultrasound shows diffuse mild increased echogenicity compatible with medical renal disease along with simple benign left renal cyst measuring maximally 3.3 cm. Similar to prior study. Consistent with renal artery stenosis  5/1/2020: s/p RHC     Chest pain w/ hx CAD (CABG), likely d/t BP and LVEDP from CHF and severe CKD per cardiology  -s/p RHC 5/1/2020: c/w dCHF with EF 35-40%  -trop flat-0.05, 0.06, 0.04, 0.04  -tele monitoring  - cont home meds: 81mg ASA, carvedilol 50 a.m. / 25 nightly  - on Eliquis  - EKG: t wave flattening in lateral leads - new.    - cardiology has signed off, 5/3/2020      Per cardiology consult note:   prior SUZANNA stent, CAD s/p CABG, chronic combined systolic and diastolic CHF and CKD stage 4 who presented to [facility] with chest pain  states that he started to get angina about a week ago with radiation into his neck. He adds that his angina may come on for a few days and then not recur for another week. This most recent episode was occurring every two hours. He took some nitroglycerin for this with relief. He went through a whole bottle actually over a week. He was a little short of breath with these episodes. .      He adds that over the last week he has not been able to breathe with laying flat. He has been propping himself up with pillows and now transitioned to the couch.      He is not yet on dialysis and has not had a fistula placed. The patient's episode was anginal in nature due to an epicardial stenosis. Rather, I think that his blood pressure and LVEDP were elevated with his chronic systolic CHF and severe CKD. This could have caused  microvascular ischemia. I any event, I do not feel that he needs a left heart catheterization or other ischemic work-up. He does need a lori discussion regarding his renal function and probably fistula placement. He would benefit from some diuresis but I will defer this to Nephrology.  If needed I could perform a right heart catheterization for filling pressures.     Continue anticoagulation for affect. SKIN: Warm and dry. HEENT: Sclera non-icteric, normocephalic, neck supple, no elevation of JVP, normal carotid pulses with no bruits and thyroid normal size. LUNGS:  No increased work of breathing and clear to auscultation, no crackles or wheezing. CARDIOVASCULAR:  Regular rate 80 and rhythm with no murmurs, gallops, rubs, or abnormal heart sounds, normal PMI. The apical impulses not displaced. Heart tones are crisp and normal                                                                                            Cervical veins are not engorged                 JVP less than 8 cm H2O                                                                              The carotid upstroke is normal in amplitude and contour without delay or bruit    ABDOMEN:  Normal bowel sounds, non-distended and non-tender to palpation   EXT: No edema, no calf tenderness. Pulses are present bilaterally.     DATA:    Lab Results   Component Value Date    ALT 13 05/04/2020    AST 16 05/04/2020    ALKPHOS 92 05/04/2020    BILITOT 0.3 05/04/2020     Lab Results   Component Value Date    CREATININE 5.8 (HH) 05/04/2020    BUN 55 (H) 05/04/2020     05/04/2020    K 4.3 05/04/2020     05/04/2020    CO2 22 05/04/2020     Lab Results   Component Value Date    TSH 1.60 05/01/2020     Lab Results   Component Value Date    WBC 5.2 04/30/2020    HGB 15.1 04/30/2020    HCT 46.8 04/30/2020    MCV 90.4 04/30/2020     (L) 04/30/2020     No components found for: CHLPL  Lab Results   Component Value Date    TRIG 75 07/25/2019    TRIG 117 10/25/2016    TRIG 113 05/19/2016     Lab Results   Component Value Date    HDL 45 07/25/2019    HDL 38 (L) 10/25/2016    HDL 41 05/19/2016     Lab Results   Component Value Date    LDLCALC 125 (H) 07/25/2019    LDLCALC 79 10/25/2016    LDLCALC 59 05/19/2016     Lab Results   Component Value Date    LABVLDL 15 07/25/2019    LABVLDL 23 10/25/2016    LABVLDL 23

## 2020-05-16 DIAGNOSIS — E78.2 MIXED HYPERLIPIDEMIA: ICD-10-CM

## 2020-05-16 LAB
CHOLESTEROL, TOTAL: 115 MG/DL (ref 0–199)
HDLC SERPL-MCNC: 47 MG/DL (ref 40–60)
LDL CHOLESTEROL CALCULATED: 54 MG/DL
TRIGL SERPL-MCNC: 69 MG/DL (ref 0–150)
VLDLC SERPL CALC-MCNC: 14 MG/DL

## 2020-05-18 RX ORDER — HYDRALAZINE HYDROCHLORIDE 100 MG/1
TABLET, FILM COATED ORAL
Qty: 270 TABLET | Refills: 3 | Status: SHIPPED | OUTPATIENT
Start: 2020-05-18 | End: 2020-09-16 | Stop reason: SDUPTHER

## 2020-05-19 ENCOUNTER — INITIAL CONSULT (OUTPATIENT)
Dept: SURGERY | Age: 55
End: 2020-05-19
Payer: COMMERCIAL

## 2020-05-19 ENCOUNTER — TELEPHONE (OUTPATIENT)
Dept: CARDIOLOGY CLINIC | Age: 55
End: 2020-05-19

## 2020-05-19 VITALS
WEIGHT: 201 LBS | HEART RATE: 85 BPM | HEIGHT: 68 IN | DIASTOLIC BLOOD PRESSURE: 75 MMHG | BODY MASS INDEX: 30.46 KG/M2 | SYSTOLIC BLOOD PRESSURE: 142 MMHG

## 2020-05-19 PROCEDURE — 99243 OFF/OP CNSLTJ NEW/EST LOW 30: CPT | Performed by: SURGERY

## 2020-05-19 ASSESSMENT — ENCOUNTER SYMPTOMS
GASTROINTESTINAL NEGATIVE: 1
RESPIRATORY NEGATIVE: 1
ALLERGIC/IMMUNOLOGIC NEGATIVE: 1
EYES NEGATIVE: 1

## 2020-05-19 NOTE — PATIENT INSTRUCTIONS
Mr. Montana Landis is going to have a PD catheter inserted in his abdomen, as well as hernia repair. He will need to get an history and physical done by his cardiologist so he can get cardiac clearance for his surgery. We would like him to stop the Eliquis 2 days before surgery. Once this is scheduled, please call us so we can schedule Covid-19 testing, and get him a surgery date. He will have general anesthesia, and go home the same day.

## 2020-05-19 NOTE — PROGRESS NOTES
Smoker    Smokeless tobacco: Never Used   Substance and Sexual Activity    Alcohol use: Yes     Comment: one drink a month    Drug use: No    Sexual activity: Yes     Partners: Female   Lifestyle    Physical activity     Days per week: Not on file     Minutes per session: Not on file    Stress: Not on file   Relationships    Social connections     Talks on phone: Not on file     Gets together: Not on file     Attends Druze service: Not on file     Active member of club or organization: Not on file     Attends meetings of clubs or organizations: Not on file     Relationship status: Not on file    Intimate partner violence     Fear of current or ex partner: Not on file     Emotionally abused: Not on file     Physically abused: Not on file     Forced sexual activity: Not on file   Other Topics Concern    Not on file   Social History Narrative    Not on file       Family History   Problem Relation Age of Onset    Coronary Art Dis Father     Diabetes Father     Coronary Art Dis Paternal Grandmother     Diabetes Paternal Grandmother     Hypertension Mother     Other Mother         Epilepsy    Other Sister         Thyroid disease  Heart murmur    Mult Sclerosis Brother     Arthritis Brother          Current Outpatient Medications:     hydrALAZINE (APRESOLINE) 100 MG tablet, TAKE ONE TABLET BY MOUTH THREE TIMES A DAY, Disp: 270 tablet, Rfl: 3    amLODIPine (NORVASC) 10 MG tablet, Take 0.5 tablets by mouth 2 times daily, Disp: 30 tablet, Rfl: 0    rosuvastatin (CRESTOR) 40 MG tablet, TAKE ONE TABLET BY MOUTH EVERY EVENING, Disp: 90 tablet, Rfl: 0    torsemide (DEMADEX) 10 MG tablet, Take 1 tablet by mouth 2 times daily, Disp: 30 tablet, Rfl: 3    tamsulosin (FLOMAX) 0.4 MG capsule, Take 1 capsule by mouth nightly, Disp: 30 capsule, Rfl: 3    isosorbide mononitrate (IMDUR) 30 MG extended release tablet, Take 1 tablet by mouth daily, Disp: 30 tablet, Rfl: 0    carvedilol (COREG) 25 MG tablet, TAKE TWO TABLETS BY MOUTH EVERY MORNING AND TAKE ONE TABLET BY MOUTH EVERY EVENING, Disp: 90 tablet, Rfl: 2    ELIQUIS 5 MG TABS tablet, TAKE ONE TABLET BY MOUTH TWICE A DAY, Disp: 60 tablet, Rfl: 3    nitroGLYCERIN (NITROSTAT) 0.4 MG SL tablet, Place 1 tablet under the tongue every 5 minutes as needed for Chest pain, Disp: 25 tablet, Rfl: 3    ranolazine (RANEXA) 1000 MG extended release tablet, Take 1 tablet by mouth 2 times daily (Patient taking differently: Take 500 mg by mouth 2 times daily ), Disp: 180 tablet, Rfl: 3    glucagon (GLUCAGON EMERGENCY) 1 MG injection, 1mg Im as needed, Disp: 1 mg, Rfl: 3    blood glucose test strips (ACCU-CHEK AIMEE) strip, 6 times daily. , Disp: 200 strip, Rfl: 6    Aimsco Ultra Thin Lancets MISC, 6 times a day, Disp: 200 each, Rfl: 3    Blood Glucose Monitoring Suppl (ACCU-CHEK AIMEE) BEHZAD, As needed, Disp: 1 Device, Rfl: 0    SOFT TOUCH LANCETS MISC, 6 times a day, Disp: 200 each, Rfl: 1    insulin aspart (NOVOLOG) 100 UNIT/ML injection vial, 20-30 units daily via pump (Patient taking differently: Bases dose off insulin sensitivity and carb ratio for dosing.), Disp: 2 vial, Rfl: 3    blood glucose test strips (ONETOUCH VERIO) strip, 1 each by In Vitro route daily 6-7 times a day As needed. , Disp: 200 each, Rfl: 3    vitamin D3 (CHOLECALCIFEROL) 10 MCG (400 UNIT) TABS tablet, Take 400 Units by mouth 2 times daily, Disp: , Rfl:     vitamin C (ASCORBIC ACID) 500 MG tablet, Take 500 mg by mouth daily, Disp: , Rfl:     Methylcobalamin (B-12) 1000 MCG TBDP, Place 1,000 mcg under the tongue daily, Disp: , Rfl:     Continuous Blood Gluc  (FREESTYLE SAL READER) BEHZAD, As needed, Disp: 1 Device, Rfl: 0    Continuous Blood Gluc Sensor (FREESTYLE SAL SENSOR SYSTEM) MISC, Every 10 days, Disp: 3 each, Rfl: 2    aspirin 81 MG tablet, Take 1 tablet by mouth daily, Disp: 90 tablet, Rfl: 3    Multiple Vitamins-Minerals (THERAPEUTIC MULTIVITAMIN-MINERALS) tablet, Take 1 AT:        Pulmonary:      Effort: Pulmonary effort is normal.      Breath sounds: Normal breath sounds. Abdominal:      General: Bowel sounds are normal.       Musculoskeletal: Normal range of motion. Right lower le+ Pitting Edema present. Left lower le+ Pitting Edema present. Neurological:      Mental Status: He is alert and oriented to person, place, and time. Psychiatric:         Speech: Speech normal.         Behavior: Behavior normal.         Thought Content: Thought content normal.         Judgment: Judgment normal.       Mr. Ralph Rothman has had CABG x 3. He also has a stent in his right carotid. He also wears an insulin pump. He has a hernia, and had a hernia repaired when he was a small child. ASSESSMENT:    Problem List Items Addressed This Visit     Stage 4 chronic kidney disease ([address] - Primary    Atrial fibrillation with rapid ventricular response ([address]      Says he is at home heart monitor and is never had any atrial fibrillation found on it. His last EKG on 429 showed sinus rhythm with occasional PVCs. He tells me they have considered stopping his Eliquis. We need him to be off the Eliquis for 2 to 3 days prior to the operation    PLAN:    Mr. Ralph Rothman is going to have a PD catheter inserted in his abdomen, as well as hernia repair. He will need to get an history and physical done by his cardiologist so he can get cardiac clearance for his surgery. We would like him to stop the Eliquis 2-3 days before surgery. Once this is scheduled, please call us so we can schedule Covid-19 testing, and get him a surgery date. He will have general anesthesia, and go home the same day. I could not definitely feel any umbilical or inguinal hernias apparently the access center found 1 by ultrasound.   Plan is to do a laparoscopic exploration through a midline incision if there is a hernia we can fix it if not we will proceed with the PD laparoscopically    I Jeniffer Mcginnis MA am scribing for and in the presence of Donavon Peterson MD on this date of 05/19/20    I Mildred Hussein MD personally performed the services described in this documentation as scribed by the Medical Assistant Augusto Griffin in my presence and it is both accurate and complete.         Electronically signed by Donavon Peterson MD on 5/19/2020 at 3:09 PM

## 2020-05-22 ENCOUNTER — HOSPITAL ENCOUNTER (OUTPATIENT)
Age: 55
Discharge: HOME | End: 2020-05-22
Payer: COMMERCIAL

## 2020-05-22 ENCOUNTER — HOSPITAL ENCOUNTER (OUTPATIENT)
Dept: GENERAL RADIOLOGY | Age: 55
Discharge: HOME | End: 2020-05-22
Payer: COMMERCIAL

## 2020-05-22 LAB
HAV IGM SER IA-ACNC: NORMAL
HEPATITIS B CORE IGM ANTIBODY: NORMAL
HEPATITIS B SURFACE ANTIGEN INTERPRETATION: NORMAL
HEPATITIS C ANTIBODY INTERPRETATION: NORMAL

## 2020-05-22 PROCEDURE — 80074 ACUTE HEPATITIS PANEL: CPT

## 2020-05-22 PROCEDURE — 36415 COLL VENOUS BLD VENIPUNCTURE: CPT

## 2020-05-22 PROCEDURE — 71046 X-RAY EXAM CHEST 2 VIEWS: CPT

## 2020-05-26 ENCOUNTER — TELEPHONE (OUTPATIENT)
Dept: SURGERY | Age: 55
End: 2020-05-26

## 2020-05-26 LAB
ANION GAP SERPL CALCULATED.3IONS-SCNC: 17 MMOL/L (ref 3–16)
BUN BLDV-MCNC: 52 MG/DL (ref 7–20)
CALCIUM SERPL-MCNC: 9 MG/DL (ref 8.3–10.6)
CHLORIDE BLD-SCNC: 99 MMOL/L (ref 99–110)
CO2: 20 MMOL/L (ref 21–32)
CREAT SERPL-MCNC: 7.5 MG/DL (ref 0.9–1.3)
GFR AFRICAN AMERICAN: 9
GFR NON-AFRICAN AMERICAN: 8
GLUCOSE BLD-MCNC: 298 MG/DL (ref 70–99)
PHOSPHORUS: 3.8 MG/DL (ref 2.5–4.9)
POTASSIUM SERPL-SCNC: 5.1 MMOL/L (ref 3.5–5.1)
SODIUM BLD-SCNC: 136 MMOL/L (ref 136–145)

## 2020-05-26 PROCEDURE — 80048 BASIC METABOLIC PNL TOTAL CA: CPT

## 2020-05-26 PROCEDURE — 84100 ASSAY OF PHOSPHORUS: CPT

## 2020-05-26 NOTE — TELEPHONE ENCOUNTER
Richieia Aubrey is going to be scheduled for PD cath placement on 6/3/2020. He is aware he needs covid testing and states he will go tomorrow.

## 2020-05-28 ENCOUNTER — TELEPHONE (OUTPATIENT)
Dept: SURGERY | Age: 55
End: 2020-05-28

## 2020-05-29 ENCOUNTER — OFFICE VISIT (OUTPATIENT)
Dept: PRIMARY CARE CLINIC | Age: 55
End: 2020-05-29

## 2020-05-29 ENCOUNTER — TELEPHONE (OUTPATIENT)
Dept: SURGERY | Age: 55
End: 2020-05-29

## 2020-05-31 LAB
SARS-COV-2: NOT DETECTED
SOURCE: NORMAL

## 2020-06-01 ENCOUNTER — TELEPHONE (OUTPATIENT)
Dept: SURGERY | Age: 55
End: 2020-06-01

## 2020-06-01 NOTE — TELEPHONE ENCOUNTER
Spoke with patient regarding scheduled surgery on 06- with Dr Lorraine Fabian. Patient is asked to arrive by 10:00 AM @ [address] after midnight. May take any Heart or Blood Pressure medication with a small sip of water to wash them down. Continue ASA up to day of surgery, please do not take on surgery day. Take a 1/2 Dose of Insulin the night prior to surgery. No Diabetic medication on the morning of surgery. Patient has held his Eliquis. You will need someone to drive you home following this procedure, and to stay with you for the remainder of the day, due to the anesthesia. Please bring a Photo ID & Insurance card with you. As you come through the Main Entrance of the hospital (where the revolving door is), someone will take your temperature and direct you to the Information Desk. Just let them know you are here for a scheduled surgery, they will direct you down the jackson to the Surgery Dept. Patient has seen Nephrologist for Pre-op H & P on 05-. Surgery is scheduled to start at approx. 12:00 PM and should take approx. 60 minutes. If the hospital needs any further information, someone will give you a call. Patient expressed a verbal understanding of these instructions and had no further questions at this time. .  Call ended.

## 2020-06-02 ENCOUNTER — ANESTHESIA EVENT (OUTPATIENT)
Dept: OPERATING ROOM | Age: 55
End: 2020-06-02
Payer: COMMERCIAL

## 2020-06-02 NOTE — PROGRESS NOTES
Phone message left to call PAT dept at [phone]  for history review and surgery instructions on 6/2/20 @ 2854

## 2020-06-03 ENCOUNTER — ANESTHESIA (OUTPATIENT)
Dept: OPERATING ROOM | Age: 55
End: 2020-06-03
Payer: COMMERCIAL

## 2020-06-03 ENCOUNTER — HOSPITAL ENCOUNTER (OUTPATIENT)
Age: 55
Setting detail: OUTPATIENT SURGERY
Discharge: HOME | End: 2020-06-03
Attending: SURGERY | Admitting: SURGERY
Payer: COMMERCIAL

## 2020-06-03 VITALS
TEMPERATURE: 97 F | WEIGHT: 201.17 LBS | RESPIRATION RATE: 16 BRPM | SYSTOLIC BLOOD PRESSURE: 143 MMHG | BODY MASS INDEX: 30.49 KG/M2 | DIASTOLIC BLOOD PRESSURE: 68 MMHG | HEART RATE: 80 BPM | HEIGHT: 68 IN | OXYGEN SATURATION: 97 %

## 2020-06-03 VITALS
TEMPERATURE: 96.8 F | OXYGEN SATURATION: 94 % | RESPIRATION RATE: 3 BRPM | SYSTOLIC BLOOD PRESSURE: 117 MMHG | DIASTOLIC BLOOD PRESSURE: 60 MMHG

## 2020-06-03 LAB
GLUCOSE BLD-MCNC: 140 MG/DL (ref 70–99)
GLUCOSE BLD-MCNC: 216 MG/DL (ref 70–99)
GLUCOSE BLD-MCNC: 218 MG/DL (ref 70–99)
GLUCOSE BLD-MCNC: 226 MG/DL (ref 70–99)
PERFORMED ON: ABNORMAL
POTASSIUM, WHOLE BLOOD: 4.4 MMOL/L (ref 3.5–5.1)

## 2020-06-03 PROCEDURE — 2500000003 HC RX 250 WO HCPCS

## 2020-06-03 PROCEDURE — 2580000003 HC RX 258: Performed by: ANESTHESIOLOGY

## 2020-06-03 PROCEDURE — 2709999900 HC NON-CHARGEABLE SUPPLY: Performed by: SURGERY

## 2020-06-03 PROCEDURE — 7100000010 HC PHASE II RECOVERY - FIRST 15 MIN: Performed by: SURGERY

## 2020-06-03 PROCEDURE — 2500000003 HC RX 250 WO HCPCS: Performed by: ANESTHESIOLOGY

## 2020-06-03 PROCEDURE — 2720000010 HC SURG SUPPLY STERILE: Performed by: SURGERY

## 2020-06-03 PROCEDURE — 6360000002 HC RX W HCPCS

## 2020-06-03 PROCEDURE — 3600000014 HC SURGERY LEVEL 4 ADDTL 15MIN: Performed by: SURGERY

## 2020-06-03 PROCEDURE — 7100000000 HC PACU RECOVERY - FIRST 15 MIN: Performed by: SURGERY

## 2020-06-03 PROCEDURE — 49324 LAP INSERT TUNNEL IP CATH: CPT | Performed by: SURGERY

## 2020-06-03 PROCEDURE — 3700000001 HC ADD 15 MINUTES (ANESTHESIA): Performed by: SURGERY

## 2020-06-03 PROCEDURE — C1750 CATH, HEMODIALYSIS,LONG-TERM: HCPCS | Performed by: SURGERY

## 2020-06-03 PROCEDURE — 84132 ASSAY OF SERUM POTASSIUM: CPT

## 2020-06-03 PROCEDURE — 3600000004 HC SURGERY LEVEL 4 BASE: Performed by: SURGERY

## 2020-06-03 PROCEDURE — 3700000000 HC ANESTHESIA ATTENDED CARE: Performed by: SURGERY

## 2020-06-03 PROCEDURE — 7100000011 HC PHASE II RECOVERY - ADDTL 15 MIN: Performed by: SURGERY

## 2020-06-03 PROCEDURE — 6360000002 HC RX W HCPCS: Performed by: SURGERY

## 2020-06-03 PROCEDURE — 7100000001 HC PACU RECOVERY - ADDTL 15 MIN: Performed by: SURGERY

## 2020-06-03 PROCEDURE — 2580000003 HC RX 258: Performed by: SURGERY

## 2020-06-03 PROCEDURE — 2500000003 HC RX 250 WO HCPCS: Performed by: SURGERY

## 2020-06-03 RX ORDER — METOPROLOL TARTRATE 5 MG/5ML
2.5 INJECTION INTRAVENOUS EVERY 6 HOURS
Status: COMPLETED | OUTPATIENT
Start: 2020-06-03 | End: 2020-06-03

## 2020-06-03 RX ORDER — FENTANYL CITRATE 50 UG/ML
INJECTION, SOLUTION INTRAMUSCULAR; INTRAVENOUS PRN
Status: DISCONTINUED | OUTPATIENT
Start: 2020-06-03 | End: 2020-06-03 | Stop reason: SDUPTHER

## 2020-06-03 RX ORDER — MAGNESIUM HYDROXIDE 1200 MG/15ML
LIQUID ORAL CONTINUOUS PRN
Status: COMPLETED | OUTPATIENT
Start: 2020-06-03 | End: 2020-06-03

## 2020-06-03 RX ORDER — FENTANYL CITRATE 50 UG/ML
25 INJECTION, SOLUTION INTRAMUSCULAR; INTRAVENOUS EVERY 5 MIN PRN
Status: DISCONTINUED | OUTPATIENT
Start: 2020-06-03 | End: 2020-06-03 | Stop reason: HOSPADM

## 2020-06-03 RX ORDER — METOPROLOL TARTRATE 5 MG/5ML
2.5 INJECTION INTRAVENOUS ONCE
Status: COMPLETED | OUTPATIENT
Start: 2020-06-03 | End: 2020-06-03

## 2020-06-03 RX ORDER — SODIUM CHLORIDE 0.9 % (FLUSH) 0.9 %
10 SYRINGE (ML) INJECTION EVERY 12 HOURS SCHEDULED
Status: DISCONTINUED | OUTPATIENT
Start: 2020-06-03 | End: 2020-06-03 | Stop reason: HOSPADM

## 2020-06-03 RX ORDER — SODIUM CHLORIDE 0.9 % (FLUSH) 0.9 %
10 SYRINGE (ML) INJECTION PRN
Status: DISCONTINUED | OUTPATIENT
Start: 2020-06-03 | End: 2020-06-03 | Stop reason: HOSPADM

## 2020-06-03 RX ORDER — SODIUM CHLORIDE 9 MG/ML
INJECTION, SOLUTION INTRAVENOUS CONTINUOUS
Status: DISCONTINUED | OUTPATIENT
Start: 2020-06-03 | End: 2020-06-03 | Stop reason: HOSPADM

## 2020-06-03 RX ORDER — ONDANSETRON 2 MG/ML
INJECTION INTRAMUSCULAR; INTRAVENOUS PRN
Status: DISCONTINUED | OUTPATIENT
Start: 2020-06-03 | End: 2020-06-03 | Stop reason: SDUPTHER

## 2020-06-03 RX ORDER — ONDANSETRON 2 MG/ML
4 INJECTION INTRAMUSCULAR; INTRAVENOUS
Status: DISCONTINUED | OUTPATIENT
Start: 2020-06-03 | End: 2020-06-03 | Stop reason: HOSPADM

## 2020-06-03 RX ORDER — PROPOFOL 10 MG/ML
INJECTION, EMULSION INTRAVENOUS PRN
Status: DISCONTINUED | OUTPATIENT
Start: 2020-06-03 | End: 2020-06-03 | Stop reason: SDUPTHER

## 2020-06-03 RX ORDER — OXYCODONE HYDROCHLORIDE AND ACETAMINOPHEN 5; 325 MG/1; MG/1
1 TABLET ORAL EVERY 6 HOURS PRN
Qty: 28 TABLET | Refills: 0 | Status: SHIPPED | OUTPATIENT
Start: 2020-06-03 | End: 2020-06-10

## 2020-06-03 RX ORDER — MIDAZOLAM HYDROCHLORIDE 1 MG/ML
INJECTION INTRAMUSCULAR; INTRAVENOUS PRN
Status: DISCONTINUED | OUTPATIENT
Start: 2020-06-03 | End: 2020-06-03 | Stop reason: SDUPTHER

## 2020-06-03 RX ORDER — LIDOCAINE HYDROCHLORIDE 20 MG/ML
INJECTION, SOLUTION EPIDURAL; INFILTRATION; INTRACAUDAL; PERINEURAL PRN
Status: DISCONTINUED | OUTPATIENT
Start: 2020-06-03 | End: 2020-06-03 | Stop reason: SDUPTHER

## 2020-06-03 RX ORDER — OXYCODONE HYDROCHLORIDE AND ACETAMINOPHEN 5; 325 MG/1; MG/1
1 TABLET ORAL PRN
Status: DISCONTINUED | OUTPATIENT
Start: 2020-06-03 | End: 2020-06-03 | Stop reason: HOSPADM

## 2020-06-03 RX ORDER — SUCCINYLCHOLINE/SOD CL,ISO/PF 200MG/10ML
SYRINGE (ML) INTRAVENOUS PRN
Status: DISCONTINUED | OUTPATIENT
Start: 2020-06-03 | End: 2020-06-03 | Stop reason: SDUPTHER

## 2020-06-03 RX ORDER — OXYCODONE HYDROCHLORIDE AND ACETAMINOPHEN 5; 325 MG/1; MG/1
2 TABLET ORAL PRN
Status: DISCONTINUED | OUTPATIENT
Start: 2020-06-03 | End: 2020-06-03 | Stop reason: HOSPADM

## 2020-06-03 RX ORDER — ROCURONIUM BROMIDE 10 MG/ML
INJECTION, SOLUTION INTRAVENOUS PRN
Status: DISCONTINUED | OUTPATIENT
Start: 2020-06-03 | End: 2020-06-03 | Stop reason: SDUPTHER

## 2020-06-03 RX ORDER — SODIUM CHLORIDE 9 MG/ML
INJECTION, SOLUTION INTRAVENOUS CONTINUOUS PRN
Status: DISCONTINUED | OUTPATIENT
Start: 2020-06-03 | End: 2020-06-03

## 2020-06-03 RX ORDER — DEXAMETHASONE SODIUM PHOSPHATE 4 MG/ML
INJECTION, SOLUTION INTRA-ARTICULAR; INTRALESIONAL; INTRAMUSCULAR; INTRAVENOUS; SOFT TISSUE PRN
Status: DISCONTINUED | OUTPATIENT
Start: 2020-06-03 | End: 2020-06-03 | Stop reason: SDUPTHER

## 2020-06-03 RX ORDER — BUPIVACAINE HYDROCHLORIDE 5 MG/ML
INJECTION, SOLUTION EPIDURAL; INTRACAUDAL
Status: COMPLETED | OUTPATIENT
Start: 2020-06-03 | End: 2020-06-03

## 2020-06-03 RX ADMIN — METOPROLOL TARTRATE 2.5 MG: 5 INJECTION INTRAVENOUS at 16:52

## 2020-06-03 RX ADMIN — MIDAZOLAM 1 MG: 1 INJECTION INTRAMUSCULAR; INTRAVENOUS at 13:44

## 2020-06-03 RX ADMIN — CEFAZOLIN SODIUM 2 G: 10 INJECTION, POWDER, FOR SOLUTION INTRAVENOUS at 13:53

## 2020-06-03 RX ADMIN — SODIUM CHLORIDE: 9 INJECTION, SOLUTION INTRAVENOUS at 13:51

## 2020-06-03 RX ADMIN — SODIUM CHLORIDE: 9 INJECTION, SOLUTION INTRAVENOUS at 11:01

## 2020-06-03 RX ADMIN — SODIUM CHLORIDE: 9 INJECTION, SOLUTION INTRAVENOUS at 14:29

## 2020-06-03 RX ADMIN — SUGAMMADEX 200 MG: 100 INJECTION, SOLUTION INTRAVENOUS at 14:24

## 2020-06-03 RX ADMIN — ROCURONIUM BROMIDE 25 MG: 10 INJECTION INTRAVENOUS at 14:02

## 2020-06-03 RX ADMIN — METOPROLOL TARTRATE 2.5 MG: 5 INJECTION INTRAVENOUS at 16:25

## 2020-06-03 RX ADMIN — ROCURONIUM BROMIDE 5 MG: 10 INJECTION INTRAVENOUS at 13:48

## 2020-06-03 RX ADMIN — FENTANYL CITRATE 50 MCG: 50 INJECTION INTRAMUSCULAR; INTRAVENOUS at 13:48

## 2020-06-03 RX ADMIN — LIDOCAINE HYDROCHLORIDE 100 MG: 20 INJECTION, SOLUTION EPIDURAL; INFILTRATION; INTRACAUDAL; PERINEURAL at 13:48

## 2020-06-03 RX ADMIN — MIDAZOLAM 1 MG: 1 INJECTION INTRAMUSCULAR; INTRAVENOUS at 13:35

## 2020-06-03 RX ADMIN — ONDANSETRON 4 MG: 2 INJECTION INTRAMUSCULAR; INTRAVENOUS at 13:45

## 2020-06-03 RX ADMIN — PROPOFOL 200 MG: 10 INJECTION, EMULSION INTRAVENOUS at 13:48

## 2020-06-03 RX ADMIN — Medication 140 MG: at 13:48

## 2020-06-03 RX ADMIN — FENTANYL CITRATE 50 MCG: 50 INJECTION INTRAMUSCULAR; INTRAVENOUS at 14:03

## 2020-06-03 RX ADMIN — FAMOTIDINE 20 MG: 10 INJECTION, SOLUTION INTRAVENOUS at 14:17

## 2020-06-03 RX ADMIN — DEXAMETHASONE SODIUM PHOSPHATE 4 MG: 4 INJECTION, SOLUTION INTRAMUSCULAR; INTRAVENOUS at 14:06

## 2020-06-03 ASSESSMENT — PULMONARY FUNCTION TESTS
PIF_VALUE: 4
PIF_VALUE: 21
PIF_VALUE: 0
PIF_VALUE: 2
PIF_VALUE: 4
PIF_VALUE: 3
PIF_VALUE: 20
PIF_VALUE: 20
PIF_VALUE: 4
PIF_VALUE: 0
PIF_VALUE: 28
PIF_VALUE: 0
PIF_VALUE: 20
PIF_VALUE: 21
PIF_VALUE: 1
PIF_VALUE: 18
PIF_VALUE: 32
PIF_VALUE: 6
PIF_VALUE: 1
PIF_VALUE: 0
PIF_VALUE: 3
PIF_VALUE: 20
PIF_VALUE: 32
PIF_VALUE: 29
PIF_VALUE: 20
PIF_VALUE: 20
PIF_VALUE: 32
PIF_VALUE: 2
PIF_VALUE: 20
PIF_VALUE: 23
PIF_VALUE: 4
PIF_VALUE: 0
PIF_VALUE: 34
PIF_VALUE: 32
PIF_VALUE: 2
PIF_VALUE: 22
PIF_VALUE: 2
PIF_VALUE: 31
PIF_VALUE: 1
PIF_VALUE: 0
PIF_VALUE: 2
PIF_VALUE: 32
PIF_VALUE: 20
PIF_VALUE: 0
PIF_VALUE: 31
PIF_VALUE: 3
PIF_VALUE: 8
PIF_VALUE: 32
PIF_VALUE: 19
PIF_VALUE: 1
PIF_VALUE: 4
PIF_VALUE: 2
PIF_VALUE: 21
PIF_VALUE: 3
PIF_VALUE: 20
PIF_VALUE: 21
PIF_VALUE: 20
PIF_VALUE: 31
PIF_VALUE: 0
PIF_VALUE: 2
PIF_VALUE: 24
PIF_VALUE: 21
PIF_VALUE: 22
PIF_VALUE: 32
PIF_VALUE: 21
PIF_VALUE: 10

## 2020-06-03 ASSESSMENT — PAIN SCALES - GENERAL
PAINLEVEL_OUTOF10: 0

## 2020-06-03 ASSESSMENT — PAIN - FUNCTIONAL ASSESSMENT: PAIN_FUNCTIONAL_ASSESSMENT: 0-10

## 2020-06-03 NOTE — ANESTHESIA PRE PROCEDURE
tablet Take 1 tablet by mouth daily 30 tablet 0    carvedilol (COREG) 25 MG tablet TAKE TWO TABLETS BY MOUTH EVERY MORNING AND TAKE ONE TABLET BY MOUTH EVERY EVENING 90 tablet 2    ELIQUIS 5 MG TABS tablet TAKE ONE TABLET BY MOUTH TWICE A DAY 60 tablet 3    nitroGLYCERIN (NITROSTAT) 0.4 MG SL tablet Place 1 tablet under the tongue every 5 minutes as needed for Chest pain 25 tablet 3    ranolazine (RANEXA) 1000 MG extended release tablet Take 1 tablet by mouth 2 times daily (Patient taking differently: Take 500 mg by mouth 2 times daily ) 180 tablet 3    glucagon (GLUCAGON EMERGENCY) 1 MG injection 1mg Im as needed 1 mg 3    blood glucose test strips (ACCU-CHEK AIMEE) strip 6 times daily. 200 strip 6    Aimsco Ultra Thin Lancets MISC 6 times a day 200 each 3    Blood Glucose Monitoring Suppl (ACCU-CHEK AIMEE) BEHZAD As needed 1 Device 0    SOFT TOUCH LANCETS MISC 6 times a day 200 each 1    insulin aspart (NOVOLOG) 100 UNIT/ML injection vial 20-30 units daily via pump (Patient taking differently: Bases dose off insulin sensitivity and carb ratio for dosing.) 2 vial 3    blood glucose test strips (ONETOUCH VERIO) strip 1 each by In Vitro route daily 6-7 times a day As needed. 200 each 3    vitamin D3 (CHOLECALCIFEROL) 10 MCG (400 UNIT) TABS tablet Take 400 Units by mouth 2 times daily      vitamin C (ASCORBIC ACID) 500 MG tablet Take 500 mg by mouth daily      Methylcobalamin (B-12) 1000 MCG TBDP Place 1,000 mcg under the tongue daily      Continuous Blood Gluc  (FREESTYLE SAL READER) BEHAZD As needed 1 Device 0    Continuous Blood Gluc Sensor (FREESTYLE SAL SENSOR SYSTEM) MISC Every 10 days 3 each 2    aspirin 81 MG tablet Take 1 tablet by mouth daily 90 tablet 3    Multiple Vitamins-Minerals (THERAPEUTIC MULTIVITAMIN-MINERALS) tablet Take 1 tablet by mouth daily.        Current Facility-Administered Medications   Medication Dose Route Frequency Provider Last Rate Last Dose    0.9 % sodium

## 2020-06-04 NOTE — OP NOTE
through but we put a stitch on either  side and then opened vertically at this spot but no hernia was found. We got into the abdomen safely, put the Lenny trocar in, secured it  with the sutures as well as inflating the balloon. Inflated the  abdomen, looked around and no abnormalities were seen. I could not see  an umbilical hernia or any other. No adhesions were found. The liver  looked normal, rest of the abdomen looked normal.    So, we picked a spot on his right side because there was less internal  fat along the anterior abdominal wall at that location, infiltrated with  local, made an 8 mm incision, went in obliquely with an 8-mm port and  then slipped the curl cath in through this 8-mm port on a dilator, put  it in the pelvis, positioned the inner cuff at the level of the muscle  and took the 5 mm port out. Before putting the catheter in, we had gone  through with the camera on this other port to look around again for  hernia but none was found. Any case, we took the 8-mm port out, made a small incision laterally to  the right and infiltrated with local here and tunneled over with the  Real guidewire. Threaded the catheter onto the guidewire and out the  small incision making a snug fit and burying the second cuff. We  checked to make sure the inner cuff was at the right spot. We connected  the catheter up to a liter of fluid, ran in about 750 mL, put it on the  floor, drained more than 250 mL but not all of it. Any case, in the  meantime, we closed the 8-mm incision with 3-0 and 4-0 Vicryl, took the  Lenny port out, closed the fascia with three figure-of-eight 0 Ethibond  sutures, closed subcu with 3-0 Vicryl, skin with 4-0 Vicryl  subcuticular, benzoined it all, did a flush of the catheter and then  clamped it, capped it, coiled it on the sponge and then covered it with  another sponge and sealed it with Tegaderms. The patient tolerated the procedure well.   Apparently, he has

## 2020-06-05 ENCOUNTER — TELEPHONE (OUTPATIENT)
Dept: CARDIOLOGY CLINIC | Age: 55
End: 2020-06-05

## 2020-06-05 NOTE — TELEPHONE ENCOUNTER
Spoke to ABHAY Ford NP as she saw patient on 5/11/20 for hospital follow up who recommended that patient speak to nephrologist about symptoms. Patient states he was urinating well with furosemide, but the nephrologist switched him to Torsemide as it would not be as hard on his kidneys. He noted weight gain, worsening shortness of breath, and elevated blood pressure. Patient also was started on Flomax during his hospitalization, but does not know why this was started. Instructed patient to call nephrologist office ASAP. If patient does not get a call back from the nephrologist office by 4-4:30 today, he should call our office back. If shortness of breath and weight gain worsens without change in diuretic dose, he should go to the ER. Patient verbalized understanding.

## 2020-06-05 NOTE — TELEPHONE ENCOUNTER
Pt states he had a procedure on  6/3/20 to begin dialyis and he has increased fluid and he is not able to sit or lay he feels like he is drowning. States his weight before procedure was 199 and currently it is 206 yesterday's BP was 143/78. States he has chest discomfort. He was taken off lasix and placed on torsemide 10 mg bid. Due to effecting kidney's less. Pt states he is not urinating as much. Please call to discuss.

## 2020-06-11 ENCOUNTER — TELEPHONE (OUTPATIENT)
Dept: CARDIOLOGY CLINIC | Age: 55
End: 2020-06-11

## 2020-06-11 ENCOUNTER — VIRTUAL VISIT (OUTPATIENT)
Dept: CARDIOLOGY CLINIC | Age: 55
End: 2020-06-11
Payer: COMMERCIAL

## 2020-06-11 PROCEDURE — 99214 OFFICE O/P EST MOD 30 MIN: CPT | Performed by: NURSE PRACTITIONER

## 2020-06-11 RX ORDER — FUROSEMIDE 80 MG
80 TABLET ORAL 2 TIMES DAILY
COMMUNITY
End: 2021-10-22 | Stop reason: ALTCHOICE

## 2020-06-11 RX ORDER — AMLODIPINE BESYLATE 5 MG/1
5 TABLET ORAL 2 TIMES DAILY
Status: ON HOLD | COMMUNITY
End: 2021-04-10 | Stop reason: HOSPADM

## 2020-06-11 RX ORDER — RANOLAZINE 500 MG/1
500 TABLET, EXTENDED RELEASE ORAL 2 TIMES DAILY
Qty: 60 TABLET | Refills: 5
Start: 2020-06-11 | End: 2020-11-06 | Stop reason: SDUPTHER

## 2020-06-11 RX ORDER — EPINEPHRINE 0.3 MG/.3ML
0.3 INJECTION SUBCUTANEOUS ONCE
Qty: 0.3 ML | Refills: 0 | Status: SHIPPED | OUTPATIENT
Start: 2020-06-11 | End: 2021-10-22 | Stop reason: RX

## 2020-06-11 NOTE — PROGRESS NOTES
2020    PMH/HPI: CAD s/p CABG '01, carotid artery stenosis s/p PTA SUZANNA, PVD, HTN, hyperlipidemia and HFrEF    Chief Complaint   Patient presents with    Congestive Heart Failure     Dr Renuka Apodaca d/c torsemide and started lasix 80mg bid    Cardiomyopathy    Coronary Artery Disease    Hyperlipidemia       TELEHEALTH EVALUATION -- Audio/Visual (During AMTHQ-37 public health emergency)  Pt at home / provider in office    [address] [place] [address] (:  1965) has requested an audio/video evaluation for the following concern(s): CAD and HFrEF. After his PD line placement on 6/3 he received 1 L IV fluids; that night he could hardly breath. His wt had increased by 12#. His nephrologist was notified and he was dialyzed daily on weekdays with today's treatment #5. He will continue tx Mon thru Friday as routine / peritoneal dialysis  His torsemide was changed over to furosemide 80 mg bid. Prior to Visit Medications    Medication Sig Taking?  Authorizing Provider   hydrALAZINE (APRESOLINE) 100 MG tablet TAKE ONE TABLET BY MOUTH THREE TIMES A DAY  Charlotte Birmingham MD   amLODIPine (NORVASC) 10 MG tablet Take 0.5 tablets by mouth 2 times daily  JAYLEN Hair CNP   rosuvastatin (CRESTOR) 40 MG tablet TAKE ONE TABLET BY MOUTH EVERY EVENING  JAYLEN Hair CNP   torsemide (DEMADEX) 10 MG tablet Take 1 tablet by mouth 2 times daily  JAYLEN Romero CNP   tamsulosin (FLOMAX) 0.4 MG capsule Take 1 capsule by mouth nightly  JAYLEN Romero CNP   isosorbide mononitrate (IMDUR) 30 MG extended release tablet Take 1 tablet by mouth daily  JAYLEN Romero CNP   carvedilol (COREG) 25 MG tablet TAKE TWO TABLETS BY MOUTH EVERY MORNING AND TAKE ONE TABLET BY MOUTH EVERY EVENING  JAYLEN Hair CNP   ELIQUIS 5 MG TABS tablet TAKE ONE TABLET BY MOUTH TWICE A DAY  Charlotte Birmingham MD   nitroGLYCERIN (NITROSTAT) 0.4 MG SL tablet Place 1 tablet under the tongue every 5 minutes as needed for Chest I diastolic dysfunction with normal left ventricular filling pressure.   Trivial aortic regurgitation.   Inadequate tricuspid regurgitation jet to estimate systolic artery pressure   (SPAP).      I had the opportunity to review the clinical symptoms and presentation of Marco A Marquez:     1. kike LE arterial dopplers d/t non-healing LLE wound   Referral to wound clinic  2. F/U in three months     Overall the patient is stable from CV standpoint    I have addresed the patient's cardiac risk factors and adjusted pharmacologic treatment as needed. In addition, I have reinforced the need for patient directed risk factor modification. Further evaluation will be based upon the patient's clinical course and testing results. All questions and concerns were addressed to the patient. Alternatives to my treatment were discussed. The patient is not currently smoking. The risks related to smoking were reviewed with the patient. Recommend maintaining a smoke-free lifestyle. Products available for smoking cessation were discussed in detail. Patient is on a beta-blocker  Patient is not on an ace-i/ARB : CKD   Patient is on a statin    Antiplatelet therapy / anti-coagulation has been recommended / prescribed for this patient. Education conducted on adverse reactions including bleeding was discussed. Angiotension inhibitor/angiotension receptor blocker has been prescribed / recommended for congestive heart failure. Daily weight, low sodium diet were discussed. Patient instructed to call the office with a weight gain: > 3 # over night or 5# in one week; swelling, SOB/orthopnea/PND    The patient verbalizes understanding not to stop medications without discussing with us. Discussed exercise: 30-60 minutes 7 days/week  Discussed Low saturated fat/RUBIA diet.  [facility]    Thank you for allowing to us to participate in the care of JAYLEN Schneider    Documentation of today's visit sent to PCP > has no PCP

## 2020-06-12 NOTE — TELEPHONE ENCOUNTER
Called pt kiet him for US on 06/13/2020 2:00pm at [facility] and then 09/08/2020 11:00am with NPT

## 2020-06-13 ENCOUNTER — HOSPITAL ENCOUNTER (OUTPATIENT)
Dept: VASCULAR LAB | Age: 55
Discharge: HOME | End: 2020-06-13
Payer: COMMERCIAL

## 2020-06-13 PROCEDURE — 93925 LOWER EXTREMITY STUDY: CPT

## 2020-06-16 RX ORDER — INSULIN GLARGINE 100 [IU]/ML
INJECTION, SOLUTION SUBCUTANEOUS
Qty: 5 PEN | Refills: 0 | Status: SHIPPED | OUTPATIENT
Start: 2020-06-16 | End: 2020-08-17

## 2020-06-24 ENCOUNTER — TELEPHONE (OUTPATIENT)
Dept: CARDIOLOGY CLINIC | Age: 55
End: 2020-06-24

## 2020-06-24 ENCOUNTER — TELEPHONE (OUTPATIENT)
Dept: ENDOCRINOLOGY | Age: 55
End: 2020-06-24

## 2020-06-24 NOTE — TELEPHONE ENCOUNTER
He started dialysis 6/8/20 . He was doing ok for a while but now is having a little SOB and mild chest pain. While at dialysis a few times his b/p has gone low and he has gotten dizzy 89/58 and 97/69. He was told by nurse at dialysis center to call [facility] to see if his meds needed adjusted .  Please call to advise

## 2020-06-24 NOTE — TELEPHONE ENCOUNTER
Called and spoke to the pt about the message below th pt stated that his chest pains woke him up from sleep. Pt stated that they are mild. He also stated that his BP has been dropping as well. He stated that when it drops he starts to feel dizzy and lightheaded. Pt stated that he wanted to talk to Dr. Marvin Marrufo about his medication. Pt was advised to go to the ER if the chest pains got any worse.

## 2020-06-24 NOTE — TELEPHONE ENCOUNTER
Pt started PD Dialyses and they said to let the doctor know due to possible issues that may occur with it . It is is causing it to spike highly.

## 2020-06-25 NOTE — TELEPHONE ENCOUNTER
Pt called back I kiet him for 07/02 1:15pm with [facility]. He wanted me to tell Emmy Hernandezing an update though on all this. Last night after dialysis his BP was 151/? He took the hydrazine but not the amlodipine. This morning his weight was down from 196 lbs to 192 lbs overnight, BP today was 121/79 he's not taking his amlodipine today he doesn't want his BP to drop again.  Pls call to advise Thank you

## 2020-06-25 NOTE — TELEPHONE ENCOUNTER
LM for pt to call me to [facility] appt with [facility]

## 2020-06-30 ENCOUNTER — VIRTUAL VISIT (OUTPATIENT)
Dept: ENDOCRINOLOGY | Age: 55
End: 2020-06-30
Payer: COMMERCIAL

## 2020-06-30 PROCEDURE — 99214 OFFICE O/P EST MOD 30 MIN: CPT | Performed by: INTERNAL MEDICINE

## 2020-06-30 PROCEDURE — 3051F HG A1C>EQUAL 7.0%<8.0%: CPT | Performed by: INTERNAL MEDICINE

## 2020-06-30 NOTE — PROGRESS NOTES
Seen as f/u patient for diabetes    Interim:   Seen after 6 month  Not following regularly  ESRD  On dialysis  Ran out of supplies for pump  Taking basaglar 24 units and novolog I:C only no SSI  Off Dexcom     ER visit for hypoglycemia- ate less  10/19 DKA- pump not working  States lost insurance  Has dexcom/t slim    Diagnosed with Type 1 diabetes mellitus since age 17    Known diabetic complications: Nephropathy, Retinopathy, Neuropathy  Severe, uncontrolled    Current diabetic medications        MN  0.925   4am  0.85--->0.875   6am 0.95  10:30 am  0.8---> 0.85   1pm  0.925    3pm 0.85---->0.925   6pm  0.975     CF 52--->  50  MN I :C  1:10   10:30  I: C  1: 9     Target 100      On Dexcom sensor    Previous:  Lantus 30 units HS  Humalog <140  No insulin  2 for 50 >150    Last A1c  7.7%<----10.3%<------11.5 % on <------ 8.4%<----8.2%<-----8% <------9% on <----8.3 on <------8.3 on 3/16<--- 9.0 on 8/15<------8.6 on 5/15<----- 9.8 on 3/15<---9.8<---10.0    Prior visit with dietician: None since Age 17  Current diet: on average, 5 meals per day not familiar with CHO counting, seeing dietician, eats a snack at night as awake/has insomnia  Current exercise: walking   Current monitoring regimen: home blood tests -6-7  times daily     Has brought blood glucose log/meter: yes  Home blood sugar records:    Any episodes of hypoglycemia? 56  Has brittle DM    H/o CAD s/p CABG     Hyperlipidemia:controlled, on statin   LDL 71 on 3/15  crestor 40mg  LDL 54 on   Last eye exam:   Last foot exam: 10/18  Last microalbumin to creatinine ratio: Cr 3.2 on 7.17---> 4.7    HTN: for years, uncontrolled  ACE-I or ARB:  coreg 50/  Hydralazine 100mg TID  norvasc 5mg  LAURA Ab 25 H    FH: Mom- CAD  Dad  DM, CAD  at 61 age  [de-identified] had MS,  in 46s    Past Medical History:   Diagnosis Date    [facility]     [facility]     Carotid artery stenosis 10/25/2016    SUZANNA stented with 8 x 30 mm non-tapered Xact stent    CHF (congestive heart failure) [facility] 3/3/15    EF 30%     CKD (chronic kidney disease) stage 2, GFR 60-89 ml/min     Coronary artery disease     sp CABG UC    Diabetic peripheral neuropathy (HCC)     Diastolic HF (heart failure) (HCC)     Hyperlipidemia with target LDL less than 70     Hypertension goal BP (blood pressure) < 130/80     PVD (peripheral vascular disease) (HCC)     Seizures (HCC)     Type 1 diabetes mellitus with chronic kidney disease (HCC)     c-peptide <0.1 in 2015     Past Surgical History:   Procedure Laterality Date    CARDIAC CATHETERIZATION      CARDIAC SURGERY      triple bypass    CORONARY ARTERY BYPASS GRAFT      HERNIA REPAIR      LAPAROSCOPY INSERTION PERITONEAL CATHETER N/A 6/3/2020    LAPAROSCOPIC PLACEMENT OF PERITONEAL DIALYSIS  CATHETER performed by Isaac White MD at [facility]       Current Outpatient Medications   Medication Sig Dispense Refill    carvedilol (COREG) 25 MG tablet TAKE TWO TABLETS BY MOUTH EVERY MORNING AND TAKE ONE TABLET BY MOUTH EVERY EVENING 270 tablet 1    BASAGLAR KWIKPEN 100 UNIT/ML injection pen INJECT 20 UNITS UNDER THE SKIN ONCE NIGHTLY WHEN OFF PUMP 5 pen 0    amLODIPine (NORVASC) 5 MG tablet Take 5 mg by mouth 2 times daily      furosemide (LASIX) 80 MG tablet Take 80 mg by mouth 2 times daily      ranolazine (RANEXA) 500 MG extended release tablet Take 1 tablet by mouth 2 times daily 60 tablet 5    hydrALAZINE (APRESOLINE) 100 MG tablet TAKE ONE TABLET BY MOUTH THREE TIMES A  tablet 3    rosuvastatin (CRESTOR) 40 MG tablet TAKE ONE TABLET BY MOUTH EVERY EVENING 90 tablet 0    isosorbide mononitrate (IMDUR) 30 MG extended release tablet Take 1 tablet by mouth daily 30 tablet 0    ELIQUIS 5 MG TABS tablet TAKE ONE TABLET BY MOUTH TWICE A DAY 60 tablet 3    nitroGLYCERIN (NITROSTAT) 0.4 MG SL tablet Place 1 tablet under the tongue every 5 minutes as needed for Chest pain 25 nausea, vomiting and abdominal pain. Genitourinary: Negative for dysuria, urgency and + frequency. Musculoskeletal: + for back pain. No joint pain  Skin: Negative for itching and rash. Neurological: Negative for dizziness. + tingling,- tremors, focal weakness and headaches. Endo/Heme/Allergies: see HPI  Psychiatric/Behavioral: Negative for depression and substance abuse. PHYSICAL EXAMINATION:  [ INSTRUCTIONS:  \"[x]\" Indicates a positive item  \"[]\" Indicates a negative item  -- DELETE ALL ITEMS NOT EXAMINED]  Vital Signs: (As obtained by patient/caregiver or practitioner observation)    Blood pressure-  Heart rate-    Respiratory rate-    Temperature-  Pulse oximetry-     Constitutional Appears well-developed and well-nourished No apparent distress        Mental status  Alert and awake  Oriented to person/place/time  Able to follow commands      Eyes:  EOM    [x]  Normal    Sclera  [x]  Normal           Discharge [x]  None visible      HENT:   [x] Normocephalic, atraumatic.     [x] Mouth/Throat: Mucous membranes are moist.     External Ears [x] Normal  no discharge    Neck: [x] No visualized mass  no swelling    Pulmonary/Chest: [x] Respiratory effort normal.  [x] No visualized signs of difficulty breathing or respiratory distress             Musculoskeletal:   [x] Normal gait with no signs of ataxia         [x] Normal range of motion of neck          Head and neck stable, appears normal ROM, strength good    Neurological:        [x] No Facial Asymmetry (Cranial nerve 7 motor function) (limited exam to video visit)          [x] No gaze palsy                Skin:        [x] No significant exanthematous lesions or discoloration noted on facial skin                 Psychiatric:       [x] Normal Affect [x] No Hallucinations            Other pertinent observable physical exam findings-     Due to this being a TeleHealth encounter, evaluation of the following organ systems is limited: check blood sugar 4-6 times a day   Patient to send blood sugar log for titration. Advise to low simple carbohydrate and protein with each  meal diet. Diabetes Care: recommend yearly eye exam, foot exam and urine microalbumin to   creatinine ratio.  Patient is up-to-date.       -Hyperlipidemia, LDL goal is  <70 mg/dl.   -Hypertension: follow with nephrology  -Daily ASA: 81mg daily  -Smoking status: Non smoker

## 2020-07-13 ENCOUNTER — VIRTUAL VISIT (OUTPATIENT)
Dept: PULMONOLOGY | Age: 55
End: 2020-07-13
Payer: COMMERCIAL

## 2020-07-13 PROBLEM — I48.91 ATRIAL FIBRILLATION WITH RAPID VENTRICULAR RESPONSE (HCC): Chronic | Status: ACTIVE | Noted: 2019-07-25

## 2020-07-13 PROCEDURE — 99244 OFF/OP CNSLTJ NEW/EST MOD 40: CPT | Performed by: INTERNAL MEDICINE

## 2020-07-13 ASSESSMENT — ENCOUNTER SYMPTOMS
RHINORRHEA: 0
EYE PAIN: 0
ALLERGIC/IMMUNOLOGIC NEGATIVE: 1
CHEST TIGHTNESS: 0
NAUSEA: 0
CHOKING: 0
VOMITING: 0
ABDOMINAL DISTENTION: 0
PHOTOPHOBIA: 0
SHORTNESS OF BREATH: 0
ABDOMINAL PAIN: 0
APNEA: 0

## 2020-07-13 ASSESSMENT — SLEEP AND FATIGUE QUESTIONNAIRES
HOW LIKELY ARE YOU TO NOD OFF OR FALL ASLEEP WHILE WATCHING TV: 3
ESS TOTAL SCORE: 11
HOW LIKELY ARE YOU TO NOD OFF OR FALL ASLEEP WHEN YOU ARE A PASSENGER IN A CAR FOR AN HOUR WITHOUT A BREAK: 1
HOW LIKELY ARE YOU TO NOD OFF OR FALL ASLEEP WHILE SITTING AND TALKING TO SOMEONE: 0
HOW LIKELY ARE YOU TO NOD OFF OR FALL ASLEEP IN A CAR, WHILE STOPPED FOR A FEW MINUTES IN TRAFFIC: 0
HOW LIKELY ARE YOU TO NOD OFF OR FALL ASLEEP WHILE SITTING AND READING: 2
HOW LIKELY ARE YOU TO NOD OFF OR FALL ASLEEP WHILE SITTING QUIETLY AFTER LUNCH WITHOUT ALCOHOL: 1
HOW LIKELY ARE YOU TO NOD OFF OR FALL ASLEEP WHILE SITTING INACTIVE IN A PUBLIC PLACE: 1
HOW LIKELY ARE YOU TO NOD OFF OR FALL ASLEEP WHILE LYING DOWN TO REST IN THE AFTERNOON WHEN CIRCUMSTANCES PERMIT: 3

## 2020-07-13 NOTE — PROGRESS NOTES
Transportation needs     Medical: Not on file     Non-medical: Not on file   Tobacco Use    Smoking status: Never Smoker    Smokeless tobacco: Never Used   Substance and Sexual Activity    Alcohol use: Yes     Comment: one drink a month    Drug use: No    Sexual activity: Yes     Partners: Female   Lifestyle    Physical activity     Days per week: Not on file     Minutes per session: Not on file    Stress: Not on file   Relationships    Social connections     Talks on phone: Not on file     Gets together: Not on file     Attends Alevism service: Not on file     Active member of club or organization: Not on file     Attends meetings of clubs or organizations: Not on file     Relationship status: Not on file    Intimate partner violence     Fear of current or ex partner: Not on file     Emotionally abused: Not on file     Physically abused: Not on file     Forced sexual activity: Not on file   Other Topics Concern    Not on file   Social History Narrative    Not on file        Current Outpatient Medications   Medication Sig Dispense Refill    Ergocalciferol (VITAMIN D2 PO) Take by mouth      carvedilol (COREG) 25 MG tablet TAKE TWO TABLETS BY MOUTH EVERY MORNING AND TAKE ONE TABLET BY MOUTH EVERY EVENING 270 tablet 1    BASAGLAR KWIKPEN 100 UNIT/ML injection pen INJECT 20 UNITS UNDER THE SKIN ONCE NIGHTLY WHEN OFF PUMP 5 pen 0    amLODIPine (NORVASC) 5 MG tablet Take 5 mg by mouth 2 times daily      furosemide (LASIX) 80 MG tablet Take 80 mg by mouth 2 times daily      ranolazine (RANEXA) 500 MG extended release tablet Take 1 tablet by mouth 2 times daily 60 tablet 5    hydrALAZINE (APRESOLINE) 100 MG tablet TAKE ONE TABLET BY MOUTH THREE TIMES A  tablet 3    rosuvastatin (CRESTOR) 40 MG tablet TAKE ONE TABLET BY MOUTH EVERY EVENING 90 tablet 0    isosorbide mononitrate (IMDUR) 30 MG extended release tablet Take 1 tablet by mouth daily 30 tablet 0    ELIQUIS 5 MG TABS tablet TAKE ONE TABLET BY MOUTH TWICE A DAY 60 tablet 3    nitroGLYCERIN (NITROSTAT) 0.4 MG SL tablet Place 1 tablet under the tongue every 5 minutes as needed for Chest pain 25 tablet 3    glucagon (GLUCAGON EMERGENCY) 1 MG injection 1mg Im as needed 1 mg 3    blood glucose test strips (ACCU-CHEK AIMEE) strip 6 times daily. 200 strip 6    Aimsco Ultra Thin Lancets MISC 6 times a day 200 each 3    Blood Glucose Monitoring Suppl (ACCU-CHEK AIMEE) BEHZAD As needed 1 Device 0    SOFT TOUCH LANCETS MISC 6 times a day 200 each 1    insulin aspart (NOVOLOG) 100 UNIT/ML injection vial 20-30 units daily via pump (Patient taking differently: Bases dose off insulin sensitivity and carb ratio for dosing.) 2 vial 3    blood glucose test strips (ONETOUCH VERIO) strip 1 each by In Vitro route daily 6-7 times a day As needed. 200 each 3    vitamin D3 (CHOLECALCIFEROL) 10 MCG (400 UNIT) TABS tablet Take 400 Units by mouth 2 times daily      vitamin C (ASCORBIC ACID) 500 MG tablet Take 500 mg by mouth daily      Methylcobalamin (B-12) 1000 MCG TBDP Place 1,000 mcg under the tongue daily      Continuous Blood Gluc  (FREESTYLE SAL READER) BEHZAD As needed 1 Device 0    Continuous Blood Gluc Sensor (FREESTYLE SAL SENSOR SYSTEM) MISC Every 10 days 3 each 2    aspirin 81 MG tablet Take 1 tablet by mouth daily 90 tablet 3    Multiple Vitamins-Minerals (THERAPEUTIC MULTIVITAMIN-MINERALS) tablet Take 1 tablet by mouth daily.  EPINEPHrine (EPIPEN 2-JESSICA) 0.3 MG/0.3ML SOAJ injection Inject 0.3 mLs into the skin once for 1 dose Use as directed for allergic reaction 0.3 mL 0     No current facility-administered medications for this visit.         Allergies as of 07/13/2020 - Review Complete 07/13/2020   Allergen Reaction Noted    Iodides Anaphylaxis 05/25/2010    Shellfish-derived products Anaphylaxis 03/02/2015    Atorvastatin calcium Rash 09/09/2005       Patient Active Problem List   Diagnosis    [facility]diabetic [facility]acidoses) [facility]    Coronary artery disease involving native heart with angina pectoris ([facility]    DM type 1 (diabetes mellitus, type 1) ([facility]    Acute kidney injury superimposed on chronic kidney disease (HCC)    PNA (pneumonia)    C[facility]ch[facility] insufficiency) [facility]    CHF (congestive heart failure) ([facility]    Hypoglycemia    Diastolic HF (heart failure) ([facility])    Hyperlipidemia with target LDL less than 70    Hypertension    CKD (chronic kidney disease) stage 2, GFR 60-89 ml/min    Type 1 diabetes mellitus with chronic kidney disease ([facility])    Diabetic peripheral neuropathy ([facility])    Carotid artery stenosis    PVD (peripheral vascular disease) ([facility]    Diabetic nephropathy associated with type 1 diabetes mellitus ([facility]    Chest pain    Stenosis of right carotid artery    Carotid artery calcification    H/O carotid angioplasty    Pure hypercholesterolemia    Abnormal cardiovascular stress test    Acute encephalopathy    Generalized idiopathic epilepsy, not intractable, without status epilepticus ([facility]    Encephalopathy    NSTEMI (non-ST elevated myocardial infarction) ([facility]    HTN (hypertension), benign    S/P CABG (coronary artery bypass graft)    Ischemic cardiomyopathy    Venous insufficiency of both lower extremities    Atrial fibrillation with rapid ventricular response ([facility])    Syncope    Stage 4 chronic kidney disease ([facility])    NSVT (nonsustained ventricular tachycardia) ([facility]    DKA, type 1, not at goal [facility])    Acute on chronic combined systolic and diastolic CHF (congestive heart failure) ([facility]       Past Medical History:   Diagnosis Date    [facility]     Cardiomyopathy ([facility]     Carotid artery stenosis 10/25/2016    SUZANNA stented with 8 x 30 mm non-tapered Xact stent    CHF (congestive heart failure) ([facility] 3/3/15    EF 30%     CKD (chronic kidney disease) stage 2, GFR 60-89 ml/min     Coronary artery disease     sp CABG UC    Diabetic peripheral neuropathy ([facility]) sleep disturbance. Negative for agitation, behavioral problems and confusion. Objective:     Vitals:  Patient reported Height and Weight Calculated BMI   Patient-Reported Vitals 7/13/2020   Patient-Reported Weight 193.5lb   Patient-Reported Height 5 7.5   Patient-Reported Systolic -   Patient-Reported Diastolic -       93.8     Due to COVID-19 this was a virtual visit and physical exam was deferred.     Electronically signed by Adele Shell MD on7/13/2020 at 9:16 AM

## 2020-07-13 NOTE — LETTER
[facility]  [address] [place] [address]  Phone: [phone]  Fax: [phone]      July 13, 2020       Patient: Clovis Christie   MR Number: [id number]   YOB: 1965   Date of Visit: 7/13/2020     Thank you for allowing me to participate in the care of Joann Montesinos.   Here is my assessment and plan. Also attached is a copy of his consult note:    ASSESSMENT:  Visit Diagnoses and Associated Orders     Hypersomnia   (New Problem)  -  Primary    needs work-up    Sleep Study with PAP Titration [[id number] Custom]   - Future Order         Snoring   (New Problem)      needs work-up    Sleep Study with PAP Titration [[id number] Custom]   - Future Order         Chronic combined systolic and diastolic congestive heart failure (HCC)   (Stable)      Sleep Study with PAP Titration [[id number] Custom]   - Future Order         Coronary artery disease involving native coronary artery of native heart with angina pectoris (HCC)   (Stable)           HTN (hypertension), benign   (Stable)           Type 1 diabetes mellitus with stage 4 chronic kidney disease (HCC)   (Stable)           Non morbid obesity, unspecified obesity type   (Stable)           ORDERS WITHOUT AN ASSOCIATED DIAGNOSIS    Ergocalciferol (VITAMIN D2 PO) [[id number]]            Plan:  Differential diagnosis includes but not limited to: KATIE, PLMD's, narcolepsy, parasomnias. Reviewed KATIE (which is the highest likelihood diagnosis): pathophysiology, diagnosis, complications and treatment. Instructed him not to drive if drowsy. Continue medications per his PCP and other physicians. Limit caffeine use after 3pm. Will do PSG to rule-out KATIE and other sleep disorders. 1 wk follow up after study to review his results. The chronic medical conditions listed are directly related to the primary diagnosis listed above. The management of the primary diagnosis affects the secondary diagnosis and vice versa.

## 2020-07-29 RX ORDER — NITROGLYCERIN 0.4 MG/1
TABLET SUBLINGUAL
Qty: 25 TABLET | Refills: 2 | Status: SHIPPED | OUTPATIENT
Start: 2020-07-29 | End: 2021-01-12

## 2020-07-30 RX ORDER — BLOOD SUGAR DIAGNOSTIC
STRIP MISCELLANEOUS
Qty: 200 STRIP | Refills: 5 | Status: SHIPPED | OUTPATIENT
Start: 2020-07-30

## 2020-08-17 RX ORDER — INSULIN GLARGINE 100 [IU]/ML
INJECTION, SOLUTION SUBCUTANEOUS
Qty: 5 PEN | Refills: 0 | Status: SHIPPED | OUTPATIENT
Start: 2020-08-17 | End: 2020-09-17

## 2020-08-18 ENCOUNTER — TELEPHONE (OUTPATIENT)
Dept: SLEEP CENTER | Age: 55
End: 2020-08-18

## 2020-08-19 ENCOUNTER — OFFICE VISIT (OUTPATIENT)
Dept: PRIMARY CARE CLINIC | Age: 55
End: 2020-08-19
Payer: COMMERCIAL

## 2020-08-19 PROCEDURE — 99211 OFF/OP EST MAY X REQ PHY/QHP: CPT | Performed by: NURSE PRACTITIONER

## 2020-08-19 NOTE — PROGRESS NOTES
Patient presented to [facility] drive up clinic for preop testing. Patient was swabbed and given information advising them to remain isolated until procedure date.

## 2020-08-20 LAB — SARS-COV-2, NAA: NOT DETECTED

## 2020-08-21 ENCOUNTER — HOSPITAL ENCOUNTER (OUTPATIENT)
Dept: SLEEP CENTER | Age: 55
Discharge: HOME | End: 2020-08-21
Payer: COMMERCIAL

## 2020-08-21 PROCEDURE — 95810 POLYSOM 6/> YRS 4/> PARAM: CPT | Performed by: INTERNAL MEDICINE

## 2020-08-21 PROCEDURE — 95810 POLYSOM 6/> YRS 4/> PARAM: CPT

## 2020-08-26 ENCOUNTER — TELEPHONE (OUTPATIENT)
Dept: ENDOCRINOLOGY | Age: 55
End: 2020-08-26

## 2020-08-26 ENCOUNTER — TELEPHONE (OUTPATIENT)
Dept: PULMONOLOGY | Age: 55
End: 2020-08-26

## 2020-08-26 NOTE — TELEPHONE ENCOUNTER
It could be due to multiple causes. He may have to see a GI physician. Diabetes can sometimes cause gastroparesis, there is a test for it called gastric scan, I can order it.

## 2020-08-26 NOTE — TELEPHONE ENCOUNTER
Pt said Dr. Simon Maldonado had told him a while back he might be getting some kind of condition where his stomach doesn't digest food and making him sick.  He said this is getting worse and he would like to speak to Candis Camp

## 2020-09-03 ENCOUNTER — TELEPHONE (OUTPATIENT)
Dept: PULMONOLOGY | Age: 55
End: 2020-09-03

## 2020-09-10 ENCOUNTER — TELEPHONE (OUTPATIENT)
Dept: SLEEP CENTER | Age: 55
End: 2020-09-10

## 2020-09-10 ENCOUNTER — TELEPHONE (OUTPATIENT)
Dept: PULMONOLOGY | Age: 55
End: 2020-09-10

## 2020-09-10 NOTE — TELEPHONE ENCOUNTER
Called to schedule 2nd sleep study  - left vm to return my phone call.     psg done at [facility] done at [facility]

## 2020-09-16 ENCOUNTER — OFFICE VISIT (OUTPATIENT)
Dept: CARDIOLOGY CLINIC | Age: 55
End: 2020-09-16
Payer: COMMERCIAL

## 2020-09-16 VITALS
HEART RATE: 81 BPM | WEIGHT: 200.6 LBS | DIASTOLIC BLOOD PRESSURE: 62 MMHG | SYSTOLIC BLOOD PRESSURE: 138 MMHG | OXYGEN SATURATION: 98 % | BODY MASS INDEX: 30.95 KG/M2

## 2020-09-16 PROCEDURE — 99214 OFFICE O/P EST MOD 30 MIN: CPT | Performed by: NURSE PRACTITIONER

## 2020-09-16 RX ORDER — HYDRALAZINE HYDROCHLORIDE 100 MG/1
TABLET, FILM COATED ORAL
Qty: 270 TABLET | Refills: 3 | Status: SHIPPED | OUTPATIENT
Start: 2020-09-16 | End: 2020-11-06 | Stop reason: SDUPTHER

## 2020-09-16 RX ORDER — CARVEDILOL 25 MG/1
TABLET ORAL
Qty: 270 TABLET | Refills: 2 | Status: CANCELLED | OUTPATIENT
Start: 2020-09-16

## 2020-09-16 NOTE — PATIENT INSTRUCTIONS
Call back with the dose of your diltiazem : it may be contributing to your leg swelling (and in addition to amlodipine)    appt in six months

## 2020-09-16 NOTE — PROGRESS NOTES
9/16/2020    PMH/HPI: CAD s/p CABG '01, carotid artery stenosis s/p PTA SUZANNA, PVD, HTN, hyperlipidemia and HFrEF    Chief Complaint   Patient presents with    Coronary Artery Disease     No cardiac complaints    Cardiomyopathy    Congestive Heart Failure    Hypertension      /52 last night    Hyperlipidemia       Prior to Visit Medications    Medication Sig Taking? Authorizing Provider   BASAGLAR KWIKPEN 100 UNIT/ML injection pen INJECT 20 UNITS UNDER THE SKIN ONCE NIGHTLY WHEN OFF PUMP Yes Awa Mora MD   blood glucose test strips (ACCU-CHEK GUIDE) strip TEST BLOOD SUGAR 6 TIMES A DAY Yes Awa Mora MD   nitroGLYCERIN (NITROSTAT) 0.4 MG SL tablet DISSOLVE 1 TAB UNDER TONGUE FOR CHEST PAIN - IF PAIN REMAINS AFTER 5 MIN, CALL 911 AND REPEAT DOSE.  MAX 3 TABS IN 15 MINUTES Yes JAYLEN Diana CNP   insulin aspart (NOVOLOG) 100 UNIT/ML injection vial INJECT UNDER THE SKIN 40-50 UNITS DAILY VIA PUMP Yes Awa Mora MD   Ergocalciferol (VITAMIN D2 PO) Take by mouth Once a week on saturdays per dialysis Yes Historical Provider, MD   carvedilol (COREG) 25 MG tablet TAKE TWO TABLETS BY MOUTH EVERY MORNING AND TAKE ONE TABLET BY MOUTH EVERY EVENING Yes JAYLEN Diana CNP   amLODIPine (NORVASC) 5 MG tablet Take 5 mg by mouth 2 times daily Yes Historical Provider, MD   furosemide (LASIX) 80 MG tablet Take 80 mg by mouth 2 times daily Yes Historical Provider, MD   ranolazine (RANEXA) 500 MG extended release tablet Take 1 tablet by mouth 2 times daily Yes JAYLEN Diana CNP   hydrALAZINE (APRESOLINE) 100 MG tablet TAKE ONE TABLET BY MOUTH THREE TIMES A DAY Yes Arsenio Benson MD   rosuvastatin (CRESTOR) 40 MG tablet TAKE ONE TABLET BY MOUTH EVERY EVENING Yes JAYLEN Diana CNP   isosorbide mononitrate (IMDUR) 30 MG extended release tablet Take 1 tablet by mouth daily Yes JAYLEN Romero CNP   ELIQUIS 5 MG TABS tablet TAKE ONE TABLET BY MOUTH TWICE A DAY Yes Prince Huang MD   glucagon (GLUCAGON EMERGENCY) 1 MG injection 1mg Im as needed Yes Laina Grullon MD   Aimsco Ultra Thin Lancets MISC 6 times a day Yes Laina Grullon MD   Blood Glucose Monitoring Suppl (ACCU-CHEK AIMEE) BEHZAD As needed Yes Laina Grullon MD   SOFT TOUCH LANCETS MISC 6 times a day Yes Laina Grullon MD   blood glucose test strips (ONETOUCH VERIO) strip 1 each by In Vitro route daily 6-7 times a day As needed. Yes Laina Grullon MD   vitamin C (ASCORBIC ACID) 500 MG tablet Take 500 mg by mouth daily Yes Historical Provider, MD   Methylcobalamin (B-12) 1000 MCG TBDP Place 1,000 mcg under the tongue daily Yes Historical Provider, MD   Continuous Blood Gluc  (Nicole Dill READER) BEHZAD As needed Yes Laina Grullon MD   Continuous Blood Gluc Sensor ([address] [facility]) [address] [facility] Every 10 days Yes Laina Grullon MD   aspirin 81 MG tablet Take 1 tablet by mouth daily Yes JAYLEN Jorge CNP   Multiple Vitamins-Minerals (THERAPEUTIC MULTIVITAMIN-MINERALS) tablet Take 1 tablet by mouth daily. Yes Historical Provider, MD   EPINEPHrine (EPIPEN 2-JESSICA) 0.3 MG/0.3ML SOAJ injection Inject 0.3 mLs into the skin once for 1 dose Use as directed for allergic reaction  JAYLEN Menchaca - NISHA       Social History     Tobacco Use    Smoking status: Never Smoker    Smokeless tobacco: Never Used   Substance Use Topics    Alcohol use: Yes     Comment: one drink a month    Drug use: No        REVIEW OF SYSTEMS:    CONSTITUTIONAL: + major weight gain then loss,; -fatigue, weakness, night sweats or fever. HEENT: No new vision difficulties or ringing in the ears. RESPIRATORY: No new SOB, PND, orthopnea / periodic cough. CARDIOVASCULAR: See HPI  GI: No nausea, vomiting; - diarrhea, constipation, abdominal pain or changes in bowel habits; uncontrollable hiccupping and burping has gone away.   : No urinary frequency, urgency, incontinence hematuria or prominent HK of the anteroseptal wall.   Left ventricular cavity size is mildly dilated.   There is mild hypertrophy of the septum.   Grade I diastolic dysfunction with normal left ventricular filling pressure.   Trivial aortic regurgitation.   Inadequate tricuspid regurgitation jet to estimate systolic artery pressure   (SPAP).      I had the opportunity to review the clinical symptoms and presentation of [address] [place]:     1. He will call back with his diltiazem dose; maybe contributing to edema taken in addition to amlodipine  2. F/U in 6 months     Overall the patient is stable from CV standpoint    I have addresed the patient's cardiac risk factors and adjusted pharmacologic treatment as needed. In addition, I have reinforced the need for patient directed risk factor modification. Further evaluation will be based upon the patient's clinical course and testing results. All questions and concerns were addressed to the patient. Alternatives to my treatment were discussed. The patient is not currently smoking. The risks related to smoking were reviewed with the patient. Recommend maintaining a smoke-free lifestyle. Patient is on a beta-blocker  Patient is not on an ace-i/ARB : ESRD  Patient is on a statin    Antiplatelet therapy / anti-coagulation has been recommended / prescribed for this patient. Education conducted on adverse reactions including bleeding was discussed. Angiotension inhibitor/angiotension receptor blocker has been prescribed / recommended for congestive heart failure. Daily weight, low sodium diet were discussed. Patient instructed to call the office with a weight gain: > 3 # over night or 5# in one week; swelling, SOB/orthopnea/PND    The patient verbalizes understanding not to stop medications without discussing with us. Discussed exercise: 30-60 minutes 7 days/week  Discussed Low saturated fat/RUBIA diet.  SMBG high in am d/t dialysate (475 this am)    Thank you for allowing to us to participate in the care of JAYLEN Hopkins    Documentation of today's visit sent to PCP > has no PCP

## 2020-09-17 RX ORDER — INSULIN GLARGINE 100 [IU]/ML
INJECTION, SOLUTION SUBCUTANEOUS
Qty: 5 PEN | Refills: 0 | Status: SHIPPED
Start: 2020-09-17 | End: 2021-03-01 | Stop reason: DRUGHIGH

## 2020-09-28 ENCOUNTER — TELEPHONE (OUTPATIENT)
Dept: CARDIOLOGY CLINIC | Age: 55
End: 2020-09-28

## 2020-09-28 NOTE — TELEPHONE ENCOUNTER
Spoke to the pt-he is talking about COVID. He was in contact with a friend, who's son tested positive for COVID. He has no symptoms, was wearing a mask, and social distancing at a football game. The dialysis Center stated they will not test him, without symptoms.

## 2020-09-28 NOTE — TELEPHONE ENCOUNTER
Pt calling he wants to know how long to stay isolated pt was exposed it has been a week and a half. No symptoms pt wants to know if he should get tested again?  pls call to advise

## 2020-09-29 NOTE — TELEPHONE ENCOUNTER
Spoke to patient and told him that he should be fine if he remains asymptomatic (no fever) 10-14 days after being with his friend. Testing for COVID is not indicated in his case, but he should seek medical attention if he begins to develop symptoms. Patient verbalized understanding of all information discussed.

## 2020-11-03 PROBLEM — I10 HYPERTENSION: Status: RESOLVED | Noted: 2020-11-03 | Resolved: 2020-11-03

## 2020-11-06 RX ORDER — HYDRALAZINE HYDROCHLORIDE 100 MG/1
TABLET, FILM COATED ORAL
Qty: 42 TABLET | Refills: 0 | Status: SHIPPED | OUTPATIENT
Start: 2020-11-06 | End: 2021-03-24 | Stop reason: SDUPTHER

## 2020-11-06 RX ORDER — RANOLAZINE 500 MG/1
500 TABLET, EXTENDED RELEASE ORAL 2 TIMES DAILY
Qty: 28 TABLET | Refills: 0 | Status: SHIPPED | OUTPATIENT
Start: 2020-11-06 | End: 2021-01-12

## 2020-11-06 RX ORDER — ISOSORBIDE MONONITRATE 30 MG/1
30 TABLET, EXTENDED RELEASE ORAL DAILY
Qty: 14 TABLET | Refills: 0 | Status: SHIPPED | OUTPATIENT
Start: 2020-11-06 | End: 2021-03-19

## 2020-11-06 NOTE — TELEPHONE ENCOUNTER
Spoke with Chilango Price- he anticipates Medicare to start in less than 10 days, but until then he is out of Ranexa. He also is out of Eliquis, Imdur, and Hydralazine.

## 2020-11-06 NOTE — TELEPHONE ENCOUNTER
I spoke to the pharmacy tech at [facility]. She quoted me the following prices for two week supply: hydralazine $12.98, isosorbide $11.98, ranolazine (Ranexa) $20.98. I spoke to Mr. Garay. He is agreeable to pay for these meds as they are cheaper than his usual pharmacy. He said his Medicare should kick in within the next week or so. He will come in today to  the meds as well as the Eliquis samples at our  (he knows they are 2.5mg bottles, to take 2 tabs twice a day). Dr. Darren Pemberton was updated.

## 2020-11-06 NOTE — TELEPHONE ENCOUNTER
Spoke with patient states that he isn;t having any other issues. States that as long as he doesn't move around a lot he is ok.

## 2020-11-06 NOTE — TELEPHONE ENCOUNTER
Pt calling he has been out of Ranexa for about a week and a half due to having to wait on his new insurance to start.  Pt says he can feel his chest pain is increasing since he stopped taking pt wants to know what he should do? pls call to advise thank you

## 2020-12-29 ENCOUNTER — OFFICE VISIT (OUTPATIENT)
Dept: SURGERY | Age: 55
End: 2020-12-29
Payer: COMMERCIAL

## 2020-12-29 VITALS
SYSTOLIC BLOOD PRESSURE: 124 MMHG | BODY MASS INDEX: 30.31 KG/M2 | HEIGHT: 68 IN | DIASTOLIC BLOOD PRESSURE: 74 MMHG | WEIGHT: 200 LBS

## 2020-12-29 PROBLEM — T82.898A STEAL SYNDROME AS COMPLICATION OF DIALYSIS ACCESS (HCC): Status: RESOLVED | Noted: 2020-12-29 | Resolved: 2020-12-29

## 2020-12-29 PROBLEM — T82.898A STEAL SYNDROME AS COMPLICATION OF DIALYSIS ACCESS (HCC): Status: ACTIVE | Noted: 2020-12-29

## 2020-12-29 PROCEDURE — 99213 OFFICE O/P EST LOW 20 MIN: CPT | Performed by: SURGERY

## 2020-12-29 ASSESSMENT — ENCOUNTER SYMPTOMS
RESPIRATORY NEGATIVE: 1
EYES NEGATIVE: 1
GASTROINTESTINAL NEGATIVE: 1
ALLERGIC/IMMUNOLOGIC NEGATIVE: 1

## 2020-12-29 NOTE — PROGRESS NOTES
Daily Progress Note   Lauren Horton MD      12/29/2020    Chief Complaint   Patient presents with    Follow-up     check PD cath wound. bleeding and clear liquid quit on saturday. slight redness. no pain. HISTORY OF PRESENT ILLNESS:                The patient is a 50 y.o. male who presents with a visit for a check of his PD catheter. Mr. Kitty Amin says his PD catheter until this past Saturday has been leaking a small amount of fluid and blood. He had it checked by dialysis- he goes to [facility] once a month to have his PD checked. His nephrologist, Dr. Michael Duarte, asked him to come and have it checked. Dr. Michael Duarte also put him on antibiotics through the catheter.        Past Medical History:   Diagnosis Date    [facility]     Cardiomyopathy ([facility]     Carotid artery stenosis 10/25/2016    SUZANNA stented with 8 x 30 mm non-tapered Xact stent    CHF (congestive heart failure) ([facility] 3/3/15    EF 30%     CKD (chronic kidney disease) stage 2, GFR 60-89 ml/min     Coronary artery disease     sp CABG UC    Diabetic peripheral neuropathy (HCC)     Diastolic HF (heart failure) (HCC)     Hyperlipidemia with target LDL less than 70     Hypertension goal BP (blood pressure) < 130/80     PVD (peripheral vascular disease) (HCC)     Seizures (HCC)     Type 1 diabetes mellitus with chronic kidney disease (HCC)     c-peptide <0.1 in 2015       Past Surgical History:   Procedure Laterality Date    CARDIAC CATHETERIZATION      CARDIAC SURGERY      triple bypass    CORONARY ARTERY BYPASS GRAFT      HERNIA REPAIR      LAPAROSCOPY INSERTION PERITONEAL CATHETER N/A 6/3/2020    LAPAROSCOPIC PLACEMENT OF PERITONEAL DIALYSIS  CATHETER performed by Chary Nicholas MD at [address] History     Socioeconomic History    Marital status: Legally      Spouse name: Not on file    Number of children: 1    Years of education: Not on file    Highest education level: Not on file   Occupational History    Occupation: IT   Social Needs    Financial resource strain: Not on file    Food insecurity     Worry: Not on file     Inability: Not on file   [employer] needs     Medical: Not on file     Non-medical: Not on file   Tobacco Use    Smoking status: Never Smoker    Smokeless tobacco: Never Used   Substance and Sexual Activity    Alcohol use: Yes     Comment: one drink a month    Drug use: No    Sexual activity: Yes     Partners: Female   Lifestyle    Physical activity     Days per week: Not on file     Minutes per session: Not on file    Stress: Not on file   Relationships    Social connections     Talks on phone: Not on file     Gets together: Not on file     Attends Taoist service: Not on file     Active member of club or organization: Not on file     Attends meetings of clubs or organizations: Not on file     Relationship status: Not on file    Intimate partner violence     Fear of current or ex partner: Not on file     Emotionally abused: Not on file     Physically abused: Not on file     Forced sexual activity: Not on file   Other Topics Concern    Not on file   Social History Narrative    Not on file       Family History   Problem Relation Age of Onset    Coronary Art Dis Father     Diabetes Father     Coronary Art Dis Paternal Grandmother     Diabetes Paternal Grandmother     Hypertension Mother     Other Mother         Epilepsy    Other Sister         Thyroid disease  Heart murmur    Mult Sclerosis Brother     Arthritis Brother     Sleep Apnea Brother          Current Outpatient Medications:     hydrALAZINE (APRESOLINE) 100 MG tablet, TAKE ONE TABLET BY MOUTH THREE TIMES A DAY, Disp: 42 tablet, Rfl: 0    ranolazine (RANEXA) 500 MG extended release tablet, Take 1 tablet by mouth 2 times daily, Disp: 28 tablet, Rfl: 0    isosorbide mononitrate (IMDUR) 30 MG extended release tablet, Take 1 tablet by mouth daily, Disp: 14 tablet, Rfl: 0    BASAGLAR KWIKPEN 100 UNIT/ML injection pen, INJECT 20 UNITS UNDER THE SKIN ONCE NIGHTLY WHEN OFF PUMP, Disp: 5 pen, Rfl: 0    apixaban (ELIQUIS) 5 MG TABS tablet, TAKE ONE TABLET BY MOUTH TWICE A DAY, Disp: 60 tablet, Rfl: 6    dilTIAZem HCl Coated Beads (DILTIAZEM CD PO), Take by mouth daily, Disp: , Rfl:     blood glucose test strips (ACCU-CHEK GUIDE) strip, TEST BLOOD SUGAR 6 TIMES A DAY, Disp: 200 strip, Rfl: 5    nitroGLYCERIN (NITROSTAT) 0.4 MG SL tablet, DISSOLVE 1 TAB UNDER TONGUE FOR CHEST PAIN - IF PAIN REMAINS AFTER 5 MIN, CALL 911 AND REPEAT DOSE.  MAX 3 TABS IN 15 MINUTES (Patient not taking: Reported on 9/16/2020), Disp: 25 tablet, Rfl: 2    insulin aspart (NOVOLOG) 100 UNIT/ML injection vial, INJECT UNDER THE SKIN 40-50 UNITS DAILY VIA PUMP, Disp: 2 vial, Rfl: 2    Ergocalciferol (VITAMIN D2 PO), Take by mouth Once a week on saturdays per dialysis, Disp: , Rfl:     carvedilol (COREG) 25 MG tablet, TAKE TWO TABLETS BY MOUTH EVERY MORNING AND TAKE ONE TABLET BY MOUTH EVERY EVENING, Disp: 270 tablet, Rfl: 1    amLODIPine (NORVASC) 5 MG tablet, Take 5 mg by mouth 2 times daily, Disp: , Rfl:     furosemide (LASIX) 80 MG tablet, Take 80 mg by mouth 2 times daily, Disp: , Rfl:     EPINEPHrine (EPIPEN 2-JESSICA) 0.3 MG/0.3ML SOAJ injection, Inject 0.3 mLs into the skin once for 1 dose Use as directed for allergic reaction, Disp: 0.3 mL, Rfl: 0    rosuvastatin (CRESTOR) 40 MG tablet, TAKE ONE TABLET BY MOUTH EVERY EVENING, Disp: 90 tablet, Rfl: 0    glucagon (GLUCAGON EMERGENCY) 1 MG injection, 1mg Im as needed (Patient not taking: Reported on 9/16/2020), Disp: 1 mg, Rfl: 3    Aimsco Ultra Thin Lancets MISC, 6 times a day, Disp: 200 each, Rfl: 3    Blood Glucose Monitoring Suppl (ACCU-CHEK AIMEE) BEHZAD, As needed, Disp: 1 Device, Rfl: 0    SOFT TOUCH LANCETS MISC, 6 times a day, Disp: 200 each, Rfl: 1    blood glucose test strips (ONETOUCH VERIO) strip, 1 each by In Vitro route daily 6-7 times a day As needed. , Disp: 200 each, Rfl: 3    vitamin C (ASCORBIC ACID) 500 MG tablet, Take 500 mg by mouth daily, Disp: , Rfl:     Methylcobalamin (B-12) 1000 MCG TBDP, Place 1,000 mcg under the tongue daily, Disp: , Rfl:     Continuous Blood Gluc  (FREESTYLE SAL READER) BEHZAD, As needed, Disp: 1 Device, Rfl: 0    Continuous Blood Gluc Sensor (FREESTYLE SAL SENSOR SYSTEM) MISC, Every 10 days, Disp: 3 each, Rfl: 2    aspirin 81 MG tablet, Take 1 tablet by mouth daily, Disp: 90 tablet, Rfl: 3    Multiple Vitamins-Minerals (THERAPEUTIC MULTIVITAMIN-MINERALS) tablet, Take 1 tablet by mouth daily. , Disp: , Rfl:     Iodides, Shellfish-derived products, and Atorvastatin calcium    Vitals:    12/29/20 0942   BP: 124/74   Site: Left Upper Arm   Weight: 200 lb (90.7 kg)   Height: 5' 7.5\" (1.715 m)       Office Visit on 08/19/2020   Component Date Value Ref Range Status    SARS-CoV-2, TALITA 08/19/2020 NOT DETECTED  NOT DETECTED Final    Comment: The SARS-CoV-2 assay is a real-time RT-PCR test intended for the  qualitative detection of nucleic acid from the SARS-CoV-2 in  respiratory specimens from individuals. Testing is limited to the  guidelines of FDA Emergency Use Authorization FDA for performing  SARS-CoV-2 testing. A Non-Detected result does not preclude the possibility of 2019-nCoV  infection since the adequacy of sample collection and/or low viral  burden may result in the presence of viral nucleic acids below the  analytical sensitivity of this test method. Test results should be used  with caution and in conjunction with other clinical and laboratory data  in making a diagnosis. Performed at: [facility]  [address] [place] [address], [address] [place] [address]  : Franklyn Cuevas MD         Review of Systems   Constitutional: Negative. HENT: Negative. Eyes: Negative. Respiratory: Negative. Cardiovascular: Positive for leg swelling. Gastrointestinal: Negative. Endocrine: Negative. Genitourinary: Negative. Musculoskeletal: Positive for joint swelling (ankle swelling). Skin: Negative. Allergic/Immunologic: Negative. Neurological: Negative. Hematological: Negative. Psychiatric/Behavioral: Negative. Physical Exam  Vitals signs and nursing note reviewed. Constitutional:       Appearance: Normal appearance. He is well-developed. Eyes:      Conjunctiva/sclera: Conjunctivae normal.      Pupils: Pupils are equal, round, and reactive to light. Neck:      Musculoskeletal: Normal range of motion. Cardiovascular:      Rate and Rhythm: Normal rate and regular rhythm. Pulses:           Carotid pulses are 2+ on the right side with bruit and 2+ on the left side. Femoral pulses are 2+ on the right side and 2+ on the left side. Dorsalis pedis pulses are detected w/ Doppler on the right side and detected w/ Doppler on the left side. Posterior tibial pulses are detected w/ Doppler on the right side and detected w/ Doppler on the left side. Heart sounds: Normal heart sounds. Comments: Doppler 2020:  Rt DP: monophasic  Rt PT: biphasic  Rt AT:     Lt DP: monophasic  Lt PT: biphasic  Lt AT:        Pulmonary:      Effort: Pulmonary effort is normal.      Breath sounds: Normal breath sounds. Abdominal:      General: Bowel sounds are normal.       Musculoskeletal: Normal range of motion. Right lower le+ Pitting Edema present. Left lower le+ Pitting Edema present. Neurological:      Mental Status: He is alert and oriented to person, place, and time. Psychiatric:         Speech: Speech normal.         Behavior: Behavior normal.         Thought Content:  Thought content normal.         Judgment: Judgment normal.       Mr. Neffs chronic kidney disease is being treated and followed by Dr. Ernesto Delgado.   ASSESSMENT:    Possible PD catheter problems  Fluid and blood draining at the exit site  Currently the wound looks great no sign of infection even with massaging the tube I can feel the superficial cuff well attached no redness no tenderness no drainage  Says he brings in his fluid once a month to be tested and that it has been fine and that the PD catheter is working well  Apparently has a donor for transplant for kidney but he also needs a pancreas transplant and is not sure whether can work these 2 things out I told him that they would probably take out the PD catheter at the time of the transplant,    PLAN:    Return as needed. Jesus Ralph MA am scribing for and in the presence of Shikha Mccall MD on this date of 12/29/20    I Gilberto Green MD personally performed the services described in this documentation as scribed by the Medical Assistant Austin Bradshaw in my presence and it is both accurate and complete.         Electronically signed by Shikha Mccall MD on 12/29/2020 at 10:22 AM

## 2021-01-12 DIAGNOSIS — I25.10 CORONARY ARTERY DISEASE DUE TO LIPID RICH PLAQUE: ICD-10-CM

## 2021-01-12 DIAGNOSIS — I25.83 CORONARY ARTERY DISEASE DUE TO LIPID RICH PLAQUE: ICD-10-CM

## 2021-01-12 RX ORDER — NITROGLYCERIN 0.4 MG/1
TABLET SUBLINGUAL
Qty: 25 TABLET | Refills: 0 | Status: SHIPPED | OUTPATIENT
Start: 2021-01-12 | End: 2021-03-12

## 2021-01-12 RX ORDER — RANOLAZINE 500 MG/1
TABLET, EXTENDED RELEASE ORAL
Qty: 60 TABLET | Refills: 2 | Status: SHIPPED | OUTPATIENT
Start: 2021-01-12 | End: 2021-04-12

## 2021-01-12 NOTE — TELEPHONE ENCOUNTER
Received refill request for ranexa, nitrostat from [facility] pharmacy.     Last ov:2020 with NPTS    Last EK2020    Last Refill:2020 #28 with 0 refills - ranexa,  2020 #25 with 2 refills Nitrostat    Next appointment:on recall list for March with NPTS

## 2021-02-09 ENCOUNTER — OFFICE VISIT (OUTPATIENT)
Dept: ENDOCRINOLOGY | Age: 56
End: 2021-02-09
Payer: COMMERCIAL

## 2021-02-09 VITALS
RESPIRATION RATE: 18 BRPM | SYSTOLIC BLOOD PRESSURE: 126 MMHG | HEART RATE: 77 BPM | BODY MASS INDEX: 29.98 KG/M2 | DIASTOLIC BLOOD PRESSURE: 65 MMHG | HEIGHT: 68 IN | WEIGHT: 197.8 LBS | OXYGEN SATURATION: 98 %

## 2021-02-09 DIAGNOSIS — Z79.4 INSULIN LONG-TERM USE (HCC): ICD-10-CM

## 2021-02-09 DIAGNOSIS — E10.22 TYPE 1 DIABETES MELLITUS WITH STAGE 4 CHRONIC KIDNEY DISEASE (HCC): Primary | ICD-10-CM

## 2021-02-09 DIAGNOSIS — N18.4 TYPE 1 DIABETES MELLITUS WITH STAGE 4 CHRONIC KIDNEY DISEASE (HCC): Primary | ICD-10-CM

## 2021-02-09 DIAGNOSIS — E78.49 OTHER HYPERLIPIDEMIA: ICD-10-CM

## 2021-02-09 DIAGNOSIS — I10 ESSENTIAL HYPERTENSION: ICD-10-CM

## 2021-02-09 LAB — HBA1C MFR BLD: 7.7 %

## 2021-02-09 PROCEDURE — G8417 CALC BMI ABV UP PARAM F/U: HCPCS | Performed by: INTERNAL MEDICINE

## 2021-02-09 PROCEDURE — 99214 OFFICE O/P EST MOD 30 MIN: CPT | Performed by: INTERNAL MEDICINE

## 2021-02-09 PROCEDURE — 1036F TOBACCO NON-USER: CPT | Performed by: INTERNAL MEDICINE

## 2021-02-09 PROCEDURE — 3051F HG A1C>EQUAL 7.0%<8.0%: CPT | Performed by: INTERNAL MEDICINE

## 2021-02-09 PROCEDURE — G8484 FLU IMMUNIZE NO ADMIN: HCPCS | Performed by: INTERNAL MEDICINE

## 2021-02-09 PROCEDURE — G8427 DOCREV CUR MEDS BY ELIG CLIN: HCPCS | Performed by: INTERNAL MEDICINE

## 2021-02-09 PROCEDURE — 83036 HEMOGLOBIN GLYCOSYLATED A1C: CPT | Performed by: INTERNAL MEDICINE

## 2021-02-09 PROCEDURE — 3017F COLORECTAL CA SCREEN DOC REV: CPT | Performed by: INTERNAL MEDICINE

## 2021-02-09 PROCEDURE — 2022F DILAT RTA XM EVC RTNOPTHY: CPT | Performed by: INTERNAL MEDICINE

## 2021-02-09 NOTE — PROGRESS NOTES
Seen as f/u patient for diabetes    Interim:   Seen after 8 month  Not following regularly  ESRD  On dialysis - PD  Off Dexcom  Not using pump as did not have insurance  He had hypoglycemia at night     ER visit for hypoglycemia- ate less  10/19 DKA- pump not working  States lost insurance  Has dexcom/t slim    Diagnosed with Type 1 diabetes mellitus since age 17    Known diabetic complications: Nephropathy, Retinopathy, Neuropathy  Severe, uncontrolled    Current diabetic medications     Basaglar  20 units  Humalog  I:C  1:16 and Cf 1:50    Previous     MN  0.925   4am  0.875   6am 0.95  10:30 am  0.85   1pm  0.925    3pm 0.925   6pm  0.975     CF   50  MN I :C  1:10   10:30  I: C  1: 9     Target 100    Previous:  Lantus 30 units HS  Humalog <140  No insulin  2 for 50 >150    Last A1c  7.7%<----10.3%<------11.5 % on <------ 8.4%<----8.2%<-----8% <------9% on <----8.3 on <------8.3 on 3/16<--- 9.0 on 8/15<------8.6 on 5/15<----- 9.8 on 3/15<---9.8<---10.0    Prior visit with dietician: None since Age 17  Current diet: on average, 5 meals per day not familiar with CHO counting, seeing dietician, eats a snack at night as awake/has insomnia  Current exercise: walking   Current monitoring regimen: home blood tests -6-7  times daily     Has brought blood glucose log/meter: yes  Home blood sugar records:    Any episodes of hypoglycemia? 58  Has brittle DM    H/o CAD s/p CABG     Hyperlipidemia:controlled, on statin   LDL 71 on 3/15  crestor 40mg  LDL 54 on   Last eye exam:   Last foot exam:   Last microalbumin to creatinine ratio:ESRD    HTN: for years, uncontrolled  ACE-I or ARB:  coreg   Hydralazine 100mg TID  norvasc 5mg  LAURA Ab 25 H    FH: Mom- CAD  Dad  DM, CAD  at 61 age  [de-identified] had MS,  in 46s    Past Medical History:   Diagnosis Date    [facility]     [facility]     Carotid artery stenosis 10/25/2016    SUZANNA stented with 8 x 30 mm non-tapered Xact stent    CHF (congestive heart failure) ([facility] 3/3/15    EF 30%     CKD (chronic kidney disease) stage 2, GFR 60-89 ml/min     Coronary artery disease     sp CABG UC    Diabetic peripheral neuropathy (HCC)     [facility]     Hyperlipidemia with target LDL less than 70     Hypertension goal BP (blood pressure) < 130/80     PVD [facility]peripheral vascular disease) ([facility]     Seizures (HCC)     Type 1 diabetes mellitus with chronic kidney disease ([facility])     c-peptide <0.1 in 2015     Past Surgical History:   Procedure Laterality Date    CARDIAC CATHETERIZATION      CARDIAC SURGERY      triple bypass    CORONARY ARTERY BYPASS GRAFT      HERNIA REPAIR      LAPAROSCOPY INSERTION PERITONEAL CATHETER N/A 6/3/2020    LAPAROSCOPIC PLACEMENT OF PERITONEAL DIALYSIS  CATHETER performed by Ludy Matthews MD at [facility]       Current Outpatient Medications   Medication Sig Dispense Refill    carvedilol (COREG) 25 MG tablet TAKE TWO TABLETS BY MOUTH EVERY MORNING AND TAKE ONE TABLET BY MOUTH EVERY EVENING 90 tablet 5    nitroGLYCERIN (NITROSTAT) 0.4 MG SL tablet DISSOLVE 1 TAB UNDER TONGUE FOR CHEST PAIN - IF PAIN REMAINS AFTER 5 MIN, CALL 911 AND REPEAT DOSE.  MAX 3 TABS IN 15 MINUTES 25 tablet 0    ranolazine (RANEXA) 500 MG extended release tablet TAKE ONE TABLET BY MOUTH TWICE A DAY 60 tablet 2    hydrALAZINE (APRESOLINE) 100 MG tablet TAKE ONE TABLET BY MOUTH THREE TIMES A DAY 42 tablet 0    isosorbide mononitrate (IMDUR) 30 MG extended release tablet Take 1 tablet by mouth daily 14 tablet 0    BASAGLAR KWIKPEN 100 UNIT/ML injection pen INJECT 20 UNITS UNDER THE SKIN ONCE NIGHTLY WHEN OFF PUMP 5 pen 0    dilTIAZem HCl Coated Beads (DILTIAZEM CD PO) Take by mouth daily      blood glucose test strips (ACCU-CHEK GUIDE) strip TEST BLOOD SUGAR 6 TIMES A  strip 5    insulin aspart (NOVOLOG) 100 UNIT/ML injection vial INJECT UNDER THE SKIN 40-50 UNITS DAILY VIA PUMP Judgement and Insight:  judgement and insight appear normal  Neuro: Normal without focal findings, speech is normal normal, speech is spontaneous  Chest: No labored breathing, no chest deformity, no stridor  Musculoskeletal: No joint deformity, swelling      2/21  Skeletal foot exam is normal, no skin lesions, toenails are normal,10 g monofilament is decreased    Lab Reviewed   No components found for: CHLPL  Lab Results   Component Value Date    TRIG 69 05/16/2020    TRIG 75 07/25/2019    TRIG 117 10/25/2016     Lab Results   Component Value Date    HDL 47 05/16/2020    HDL 45 07/25/2019    HDL 38 (L) 10/25/2016     Lab Results   Component Value Date    LDLCALC 54 05/16/2020    LDLCALC 125 (H) 07/25/2019    LDLCALC 79 10/25/2016     Lab Results   Component Value Date    LABVLDL 14 05/16/2020    LABVLDL 15 07/25/2019    LABVLDL 23 10/25/2016     Lab Results   Component Value Date    LABA1C 7.7 05/01/2020       Assessment:     Serina Smith is a 50 y.o. male with :    1.T1DM:Longstanding, uncontrolled, has complications. Not following regularly. Saw educator. Started on insulin pump/sensor. Needs to bolus regularly. Advise to bolus after meal if not sure of intake. Goal 7-8%  Will treat with MDI until has pump  Lower basal given hypoglycemia  Does have T slim with Dexcom with basal IQ to help prevent lows. Advised to restart once has supplies  Highs since on PD. Improved since he increased basaglar dose  2. HTN: BP at goal  3. HLD: LDL at goal  4. Obesity  5. CKD: ESRD, on PD  6. Neuropathy  7. CAD: S/p CABG  8. CHF: EF 30%  9. PVD: sees Dr. Lucy Jensen  10. KELLY: Right 90% occlusion     Plan:     Basaglar 18 units   Humalog I:C   1:15   CF 1 for 50 > 150   Advise to check blood sugar 4-6 times a day   Patient to send blood sugar log for titration. Advise to low simple carbohydrate and protein with each  meal diet. Diabetes Care: recommend yearly eye exam, foot exam and urine microalbumin to   creatinine ratio.  Patient is up-to-date.       -Hyperlipidemia, LDL goal is  <70 mg/dl.   -Hypertension: follow with nephrology  -Daily ASA: 81mg daily  -Smoking status: Non smoker

## 2021-03-01 ENCOUNTER — APPOINTMENT (OUTPATIENT)
Dept: GENERAL RADIOLOGY | Age: 56
DRG: 069 | End: 2021-03-01
Payer: COMMERCIAL

## 2021-03-01 ENCOUNTER — HOSPITAL ENCOUNTER (INPATIENT)
Age: 56
LOS: 4 days | Discharge: HOME | DRG: 069 | End: 2021-03-05
Attending: EMERGENCY MEDICINE | Admitting: INTERNAL MEDICINE
Payer: COMMERCIAL

## 2021-03-01 ENCOUNTER — APPOINTMENT (OUTPATIENT)
Dept: CT IMAGING | Age: 56
DRG: 069 | End: 2021-03-01
Payer: COMMERCIAL

## 2021-03-01 DIAGNOSIS — I63.231 ARTERIAL ISCHEMIC STROKE, ICA, RIGHT, ACUTE (HCC): ICD-10-CM

## 2021-03-01 DIAGNOSIS — E16.2 HYPOGLYCEMIA: ICD-10-CM

## 2021-03-01 DIAGNOSIS — R29.898 LEFT LEG WEAKNESS: Primary | ICD-10-CM

## 2021-03-01 DIAGNOSIS — N18.6 END-STAGE RENAL DISEASE ON PERITONEAL DIALYSIS (HCC): ICD-10-CM

## 2021-03-01 DIAGNOSIS — Z99.2 END-STAGE RENAL DISEASE ON PERITONEAL DIALYSIS (HCC): ICD-10-CM

## 2021-03-01 LAB
A/G RATIO: 1.2 (ref 1.1–2.2)
ALBUMIN SERPL-MCNC: 4.4 G/DL (ref 3.4–5)
ALP BLD-CCNC: 129 U/L (ref 40–129)
ALT SERPL-CCNC: 11 U/L (ref 10–40)
ANION GAP SERPL CALCULATED.3IONS-SCNC: 14 MMOL/L (ref 3–16)
AST SERPL-CCNC: 15 U/L (ref 15–37)
BASOPHILS ABSOLUTE: 0 K/UL (ref 0–0.2)
BASOPHILS RELATIVE PERCENT: 0.5 %
BILIRUB SERPL-MCNC: 0.3 MG/DL (ref 0–1)
BUN BLDV-MCNC: 44 MG/DL (ref 7–20)
CALCIUM SERPL-MCNC: 9.1 MG/DL (ref 8.3–10.6)
CHLORIDE BLD-SCNC: 99 MMOL/L (ref 99–110)
CO2: 25 MMOL/L (ref 21–32)
CREAT SERPL-MCNC: 7 MG/DL (ref 0.9–1.3)
EOSINOPHILS ABSOLUTE: 0.2 K/UL (ref 0–0.6)
EOSINOPHILS RELATIVE PERCENT: 4.4 %
GFR AFRICAN AMERICAN: 10
GFR NON-AFRICAN AMERICAN: 8
GLOBULIN: 3.6 G/DL
GLUCOSE BLD-MCNC: 216 MG/DL (ref 70–99)
GLUCOSE BLD-MCNC: 261 MG/DL (ref 70–99)
GLUCOSE BLD-MCNC: 75 MG/DL (ref 70–99)
GLUCOSE BLD-MCNC: 94 MG/DL (ref 70–99)
HCT VFR BLD CALC: 49.4 % (ref 40.5–52.5)
HEMOGLOBIN: 15.9 G/DL (ref 13.5–17.5)
INR BLD: 1.04 (ref 0.86–1.14)
LYMPHOCYTES ABSOLUTE: 1.2 K/UL (ref 1–5.1)
LYMPHOCYTES RELATIVE PERCENT: 29.4 %
MCH RBC QN AUTO: 28.3 PG (ref 26–34)
MCHC RBC AUTO-ENTMCNC: 32.1 G/DL (ref 31–36)
MCV RBC AUTO: 88.2 FL (ref 80–100)
MONOCYTES ABSOLUTE: 0.6 K/UL (ref 0–1.3)
MONOCYTES RELATIVE PERCENT: 15.3 %
NEUTROPHILS ABSOLUTE: 2.1 K/UL (ref 1.7–7.7)
NEUTROPHILS RELATIVE PERCENT: 50.4 %
PDW BLD-RTO: 15.3 % (ref 12.4–15.4)
PERFORMED ON: ABNORMAL
PERFORMED ON: ABNORMAL
PERFORMED ON: NORMAL
PLATELET # BLD: 159 K/UL (ref 135–450)
PMV BLD AUTO: 9.9 FL (ref 5–10.5)
POTASSIUM REFLEX MAGNESIUM: 4.6 MMOL/L (ref 3.5–5.1)
PROTHROMBIN TIME: 12.1 SEC (ref 10–13.2)
RBC # BLD: 5.6 M/UL (ref 4.2–5.9)
SODIUM BLD-SCNC: 138 MMOL/L (ref 136–145)
TOTAL PROTEIN: 8 G/DL (ref 6.4–8.2)
TROPONIN: 0.06 NG/ML
WBC # BLD: 4.2 K/UL (ref 4–11)

## 2021-03-01 PROCEDURE — 96360 HYDRATION IV INFUSION INIT: CPT

## 2021-03-01 PROCEDURE — 85025 COMPLETE CBC W/AUTO DIFF WBC: CPT

## 2021-03-01 PROCEDURE — 2060000000 HC ICU INTERMEDIATE R&B

## 2021-03-01 PROCEDURE — 93005 ELECTROCARDIOGRAM TRACING: CPT | Performed by: PHYSICIAN ASSISTANT

## 2021-03-01 PROCEDURE — 80053 COMPREHEN METABOLIC PANEL: CPT

## 2021-03-01 PROCEDURE — 96361 HYDRATE IV INFUSION ADD-ON: CPT

## 2021-03-01 PROCEDURE — 6370000000 HC RX 637 (ALT 250 FOR IP): Performed by: PHYSICIAN ASSISTANT

## 2021-03-01 PROCEDURE — 99285 EMERGENCY DEPT VISIT HI MDM: CPT

## 2021-03-01 PROCEDURE — 96372 THER/PROPH/DIAG INJ SC/IM: CPT

## 2021-03-01 PROCEDURE — 84484 ASSAY OF TROPONIN QUANT: CPT

## 2021-03-01 PROCEDURE — 6370000000 HC RX 637 (ALT 250 FOR IP): Performed by: INTERNAL MEDICINE

## 2021-03-01 PROCEDURE — 71045 X-RAY EXAM CHEST 1 VIEW: CPT

## 2021-03-01 PROCEDURE — 2580000003 HC RX 258: Performed by: INTERNAL MEDICINE

## 2021-03-01 PROCEDURE — 70450 CT HEAD/BRAIN W/O DYE: CPT

## 2021-03-01 PROCEDURE — 85610 PROTHROMBIN TIME: CPT

## 2021-03-01 PROCEDURE — 3E1M39Z IRRIGATION OF PERITONEAL CAVITY USING DIALYSATE, PERCUTANEOUS APPROACH: ICD-10-PCS | Performed by: INTERNAL MEDICINE

## 2021-03-01 PROCEDURE — 6360000002 HC RX W HCPCS: Performed by: INTERNAL MEDICINE

## 2021-03-01 RX ORDER — POLYETHYLENE GLYCOL 3350 17 G/17G
17 POWDER, FOR SOLUTION ORAL DAILY PRN
Status: DISCONTINUED | OUTPATIENT
Start: 2021-03-01 | End: 2021-03-05 | Stop reason: HOSPADM

## 2021-03-01 RX ORDER — INSULIN LISPRO 100 [IU]/ML
0-6 INJECTION, SOLUTION INTRAVENOUS; SUBCUTANEOUS
Status: DISCONTINUED | OUTPATIENT
Start: 2021-03-02 | End: 2021-03-02

## 2021-03-01 RX ORDER — LABETALOL HYDROCHLORIDE 5 MG/ML
10 INJECTION, SOLUTION INTRAVENOUS EVERY 10 MIN PRN
Status: DISCONTINUED | OUTPATIENT
Start: 2021-03-01 | End: 2021-03-05 | Stop reason: HOSPADM

## 2021-03-01 RX ORDER — ROSUVASTATIN CALCIUM 40 MG/1
1 TABLET, COATED ORAL EVERY EVENING
Status: DISCONTINUED | OUTPATIENT
Start: 2021-03-01 | End: 2021-03-01 | Stop reason: ALTCHOICE

## 2021-03-01 RX ORDER — SODIUM CHLORIDE 0.9 % (FLUSH) 0.9 %
10 SYRINGE (ML) INJECTION EVERY 12 HOURS SCHEDULED
Status: DISCONTINUED | OUTPATIENT
Start: 2021-03-01 | End: 2021-03-05 | Stop reason: HOSPADM

## 2021-03-01 RX ORDER — PROMETHAZINE HYDROCHLORIDE 25 MG/1
12.5 TABLET ORAL EVERY 6 HOURS PRN
Status: DISCONTINUED | OUTPATIENT
Start: 2021-03-01 | End: 2021-03-05 | Stop reason: HOSPADM

## 2021-03-01 RX ORDER — RANOLAZINE 500 MG/1
500 TABLET, EXTENDED RELEASE ORAL 2 TIMES DAILY
Status: DISCONTINUED | OUTPATIENT
Start: 2021-03-01 | End: 2021-03-05 | Stop reason: HOSPADM

## 2021-03-01 RX ORDER — ONDANSETRON 2 MG/ML
4 INJECTION INTRAMUSCULAR; INTRAVENOUS EVERY 6 HOURS PRN
Status: DISCONTINUED | OUTPATIENT
Start: 2021-03-01 | End: 2021-03-05 | Stop reason: HOSPADM

## 2021-03-01 RX ORDER — HEPARIN SODIUM 5000 [USP'U]/ML
5000 INJECTION, SOLUTION INTRAVENOUS; SUBCUTANEOUS EVERY 8 HOURS SCHEDULED
Status: DISCONTINUED | OUTPATIENT
Start: 2021-03-01 | End: 2021-03-05 | Stop reason: HOSPADM

## 2021-03-01 RX ORDER — SODIUM CHLORIDE 0.9 % (FLUSH) 0.9 %
10 SYRINGE (ML) INJECTION PRN
Status: DISCONTINUED | OUTPATIENT
Start: 2021-03-01 | End: 2021-03-05 | Stop reason: HOSPADM

## 2021-03-01 RX ORDER — NICOTINE POLACRILEX 4 MG
15 LOZENGE BUCCAL PRN
Status: DISCONTINUED | OUTPATIENT
Start: 2021-03-01 | End: 2021-03-05 | Stop reason: HOSPADM

## 2021-03-01 RX ORDER — INSULIN ASPART 100 [IU]/ML
0-7 INJECTION, SOLUTION INTRAVENOUS; SUBCUTANEOUS
COMMUNITY
End: 2021-10-22

## 2021-03-01 RX ORDER — ASPIRIN 81 MG/1
324 TABLET, CHEWABLE ORAL ONCE
Status: COMPLETED | OUTPATIENT
Start: 2021-03-01 | End: 2021-03-01

## 2021-03-01 RX ORDER — ASPIRIN 81 MG/1
81 TABLET ORAL DAILY
Status: DISCONTINUED | OUTPATIENT
Start: 2021-03-02 | End: 2021-03-05 | Stop reason: HOSPADM

## 2021-03-01 RX ORDER — ASCORBIC ACID 500 MG
500 TABLET ORAL DAILY
Status: CANCELLED | OUTPATIENT
Start: 2021-03-02

## 2021-03-01 RX ORDER — ASPIRIN 300 MG/1
300 SUPPOSITORY RECTAL DAILY
Status: DISCONTINUED | OUTPATIENT
Start: 2021-03-02 | End: 2021-03-05 | Stop reason: HOSPADM

## 2021-03-01 RX ORDER — DEXTROSE MONOHYDRATE 50 MG/ML
100 INJECTION, SOLUTION INTRAVENOUS PRN
Status: DISCONTINUED | OUTPATIENT
Start: 2021-03-01 | End: 2021-03-05 | Stop reason: HOSPADM

## 2021-03-01 RX ORDER — CALCITRIOL 0.25 UG/1
0.25 CAPSULE, LIQUID FILLED ORAL DAILY
COMMUNITY
End: 2021-10-22

## 2021-03-01 RX ORDER — DEXTROSE MONOHYDRATE 50 MG/ML
INJECTION, SOLUTION INTRAVENOUS CONTINUOUS
Status: DISPENSED | OUTPATIENT
Start: 2021-03-01 | End: 2021-03-01

## 2021-03-01 RX ORDER — DEXTROSE MONOHYDRATE 25 G/50ML
12.5 INJECTION, SOLUTION INTRAVENOUS PRN
Status: DISCONTINUED | OUTPATIENT
Start: 2021-03-01 | End: 2021-03-05 | Stop reason: HOSPADM

## 2021-03-01 RX ORDER — CLOPIDOGREL BISULFATE 75 MG/1
75 TABLET ORAL DAILY
Status: DISCONTINUED | OUTPATIENT
Start: 2021-03-01 | End: 2021-03-05 | Stop reason: HOSPADM

## 2021-03-01 RX ADMIN — RANOLAZINE 500 MG: 500 TABLET, FILM COATED, EXTENDED RELEASE ORAL at 23:26

## 2021-03-01 RX ADMIN — HEPARIN SODIUM 5000 UNITS: 5000 INJECTION INTRAVENOUS; SUBCUTANEOUS at 23:25

## 2021-03-01 RX ADMIN — Medication 10 ML: at 20:28

## 2021-03-01 RX ADMIN — DEXTROSE MONOHYDRATE: 50 INJECTION, SOLUTION INTRAVENOUS at 18:09

## 2021-03-01 RX ADMIN — CLOPIDOGREL BISULFATE 75 MG: 75 TABLET ORAL at 18:13

## 2021-03-01 RX ADMIN — ASPIRIN 324 MG: 81 TABLET, CHEWABLE ORAL at 16:47

## 2021-03-01 NOTE — H&P
Hospital Medicine History and Physical    3/1/2021    Date of Admission: 3/1/2021    Date of Service: Pt seen/examined on 3/1/2021 and admitted to inpatient. Assessment/plan:  1. Left lower extremity weakness, numbness. Continue antiplatelet therapy (continue aspirin, start Plavix), high-dose statin. Hold antihypertensive medications to allow permissive hypertension. Hypoglycemia could have contributed, although symptoms persist despite correction of hypoglycemia. Monitor on telemetry, plan for MRI-brain, echocardiogram.  2. Acute metabolic encephalopathy, resolved. Likely secondary to hypoglycemia, which has not been corrected. Monitor mental status closely. 3. Hypoglycemia in the setting of history of diabetes type 1. Hold scheduled insulin. Use sliding scale insulin. Start D5 W at 50 cc an hour x5 hours, monitor Accu-Chek hourly x4, then before meals and at bedtime. 4. End-stage renal disease on peritoneal dialysis. Nephrology has been consulted to assist with dialysis needs during hospital stay. 5. Other comorbidities: history carotid artery disease, chronic diastolic CHF, end-stage renal disease on peritoneal dialysis, history of CAD status post CABG, seizure disorder. Activities: Up with assist  Prophylaxis: Subcutaneous heparin  Code status: Full code    ==========================================================  Chief complaint:  Chief Complaint   Patient presents with    Extremity Weakness     Pt from home, states his glucose was low and he drank OJ, very fatigued, slurred speech, lethargic. History of Presenting Illness: This is a pleasant 50 y.o. male with history carotid artery disease, chronic diastolic CHF, end-stage renal disease on peritoneal dialysis, history of CAD status post CABG, diabetes type 1, seizure disorder, who presents to the emergency room for evaluation of possible CVA.   Patient was last known well at 2:30 AM, woke up around 9:00 this morning with left leg numbness and weakness, slurred speech, lethargy. He also reports generalized weakness, diaphoresis blood glucose was noted to be 47 when checked by his girlfriend. He was administered some juice and on presentation to the emergency room, glucose has since improved. However, he reports persistent left leg weakness and numbness. Stroke alert was called, not a candidate for TPA, given onset of symptoms appears to be outside of window. Hospital medicine service consulted for admission for further evaluation. Past Medical History:      Diagnosis Date    [facility] rest [facility] [address] [address]     Cardiomyopathy [facility] [address] [facility]     Carotid artery stenosis 10/25/2016    SUZANNA stented with 8 x 30 mm non-tapered Xact stent    CHF (congestive heart failure) ([facility] [address] 3/3/15    EF 30%     CKD (chronic kidney disease) stage 2, GFR 60-89 ml/min     Coronary artery disease     sp CABG UC    [facility]abetic peripheral neuropathy ([facility]     Diastolic HF (heart failure) ([facility])     Hyperlipidemia with target LDL less than 70     Hypertension goal BP (blood pressure) < 130/80     PVD (peripheral vascular disease) ([facility])     Seizures ([facility])     Type 1 diabetes mellitus with chronic kidney disease ([facility])     c-peptide <0.1 in 2015       Past Surgical History:      Procedure Laterality Date    CARDIAC CATHETERIZATION      CARDIAC SURGERY      triple bypass    CORONARY ARTERY BYPASS GRAFT      HERNIA REPAIR      LAPAROSCOPY INSERTION PERITONEAL CATHETER N/A 6/3/2020    LAPAROSCOPIC PLACEMENT OF PERITONEAL DIALYSIS  CATHETER performed [facility]adares [facility]         Medications (prior to admission):  Prior to Admission medications    Medication Sig Start Date End Date Taking?  Authorizing Provider   carvedilol (COREG) 25 MG tablet TAKE TWO TABLETS BY MOUTH EVERY MORNING AND TAKE ONE TABLET BY MOUTH EVERY EVENING 1/12/21   JAYLEN Cassidy - CNP   nitroGLYCERIN (NITROSTAT) 0.4 MG SL tablet DISSOLVE 1 TAB UNDER TONGUE FOR CHEST PAIN - IF PAIN REMAINS AFTER 5 MIN, CALL 911 AND REPEAT DOSE. MAX 3 TABS IN 15 MINUTES 1/12/21   JAYLEN Bender CNP   ranolazine (RANEXA) 500 MG extended release tablet TAKE ONE TABLET BY MOUTH TWICE A DAY 1/12/21   JAYLEN Bender CNP   hydrALAZINE (APRESOLINE) 100 MG tablet TAKE ONE TABLET BY MOUTH THREE TIMES A DAY 11/6/20   Nora Gutierrez MD   isosorbide mononitrate (IMDUR) 30 MG extended release tablet Take 1 tablet by mouth daily 11/6/20   Nora Gutierrez MD   BASAGLAR KWIKPEN 100 UNIT/ML injection pen INJECT 20 UNITS UNDER THE SKIN ONCE NIGHTLY WHEN OFF PUMP 9/17/20   Juanito Delaney MD   dilTIAZem HCl Coated Beads (DILTIAZEM CD PO) Take by mouth daily    Historical Provider, MD   blood glucose test strips (ACCU-CHEK GUIDE) strip TEST BLOOD SUGAR 6 TIMES A DAY 7/30/20   Juanito Delaney MD   insulin aspart (NOVOLOG) 100 UNIT/ML injection vial INJECT UNDER THE SKIN 40-50 UNITS DAILY VIA PUMP 7/23/20   Juanito Delaney MD   Ergocalciferol (VITAMIN D2 PO) Take by mouth Once a week on saturdays per dialysis    Historical Provider, MD   amLODIPine (NORVASC) 5 MG tablet Take 5 mg by mouth 2 times daily    Historical Provider, MD   furosemide (LASIX) 80 MG tablet Take 80 mg by mouth 2 times daily    Historical Provider, MD   EPINEPHrine (EPIPEN 2-JESSICA) 0.3 MG/0.3ML SOAJ injection Inject 0.3 mLs into the skin once for 1 dose Use as directed for allergic reaction 6/11/20 3/1/21  JAYLEN Bender CNP   rosuvastatin (CRESTOR) 40 MG tablet TAKE ONE TABLET BY MOUTH EVERY EVENING 5/11/20   JAYLEN Bender CNP   vitamin C (ASCORBIC ACID) 500 MG tablet Take 500 mg by mouth daily    Historical Provider, MD   Methylcobalamin (B-12) 1000 MCG TBDP Place 1,000 mcg under the tongue daily    Historical Provider, MD   aspirin 81 MG tablet Take 1 tablet by mouth daily 10/26/16   JAYLEN Sanz CNP       Allergy(ies):   Iodides, Shellfish-derived products, and Atorvastatin calcium    Social History:  TOBACCO:  reports that he has never smoked. He has never used smokeless tobacco.  ETOH:  reports current alcohol use. Family History:      Problem Relation Age of Onset    Coronary Art Dis Father     Diabetes Father     Coronary Art Dis Paternal Grandmother     Diabetes Paternal Grandmother     Hypertension Mother     Other Mother         Epilepsy    Other Sister         Thyroid disease  Heart murmur    Mult Sclerosis Brother     Arthritis Brother     Sleep Apnea Brother        Review of Systems:  Pertinent positives are listed in HPI. At least 10-point ROS reviewed and were negative. Vitals and physical examination:  /74   Pulse 66   Resp 14   Wt 179 lb (81.2 kg)   SpO2 99%   BMI 27.62 kg/m²   Gen/overall appearance: Not in acute distress. Alert. Oriented X3. Head: Normocephalic, atraumatic  Eyes: EOMI, good acuity  ENT: Oral mucosa moist  Neck: No JVD, thyromegaly  CVS: Nml S1S2, no MRG, RRR  Pulm: Clear bilaterally. No crackles/wheezes  Gastrointestinal: Soft, NT/ND, +BS  Musculoskeletal: No edema. Warm  Neuro: Left lower extremity weakness, 3/5. There is decreased sensation in left upper and lower. Psychiatry: Appropriate affect. Not agitated. Skin: Warm, dry with normal turgor.  No rash  Capillary refill: Brisk,< 3 seconds   Peripheral Pulses: +2 palpable, equal bilaterally       Labs/imaging/EKG:  CBC:   Recent Labs     03/01/21  1528   WBC 4.2   HGB 15.9        BMP:    Recent Labs     03/01/21  1528      K 4.6   CL 99   CO2 25   BUN 44*   CREATININE 7.0*   GLUCOSE 75     Hepatic:   Recent Labs     03/01/21  1528   AST 15   ALT 11   BILITOT 0.3   ALKPHOS 129       Ct Head Wo Contrast    Result Date: 3/1/2021  EXAMINATION: CT OF THE HEAD WITHOUT CONTRAST  3/1/2021 3:48 pm TECHNIQUE: CT of the head was performed without the administration of intravenous contrast. Dose modulation, iterative reconstruction, and/or weight based adjustment of the mA/kV was utilized to reduce the radiation dose to as low as reasonably achievable. COMPARISON: None HISTORY: ORDERING SYSTEM PROVIDED HISTORY: Left leg weakness TECHNOLOGIST PROVIDED HISTORY: Has a \"code stroke\" or \"stroke alert\" been called? ->Yes Reason for exam:->Left leg weakness Decision Support Exception->Emergency Medical Condition (MA) Reason for Exam: stroke alert Acuity: Acute Type of Exam: Initial FINDINGS: BRAIN/VENTRICLES: There is no acute intracranial hemorrhage, mass effect or midline shift. No abnormal extra-axial fluid collection. The gray-white differentiation is maintained without evidence of an acute infarct. There is no evidence of hydrocephalus. ORBITS: The visualized portion of the orbits demonstrate no acute abnormality. SINUSES: Complete opacification of right frontal sinus, high attenuation. Mucoperiosteal thickening, bubbly mucus-like material, and small air-fluid level to the left sphenoid sinus. Mucoperiosteal thickening to partially imaged bilateral maxillary sinuses, right more than left, with likely some superimposed mucous retention cysts versus sinus polyps. Mastoid air cells and middle ears appear clear. SOFT TISSUES/SKULL:  No acute abnormality of the visualized skull or soft tissues. No acute intracranial abnormality. Paranasal sinus disease as above. This includes complete opacification of right frontal sinus to include high attenuation material which likely reflects inspissated mucus material.  Fungal infection felt much less likely but suggest clinical correlation. Findings were discussed with Carisa Camilo at 4:00 pm on 3/1/2021.      Xr Chest Portable    Result Date: 3/1/2021  EXAMINATION: ONE XRAY VIEW OF THE CHEST 3/1/2021 3:42 pm COMPARISON: May 22, 2020 HISTORY: ORDERING SYSTEM PROVIDED HISTORY: Left leg weakness TECHNOLOGIST PROVIDED HISTORY: Reason for exam:->Left leg weakness Reason for Exam: Extremity Weakness Acuity: Acute Type of Exam: Initial

## 2021-03-01 NOTE — ED NOTES
Pharmacy Medication History Note      List of current medications patient is taking is complete. Source of information: Patient medication bottles    Changes made to medication list:  Medications flagged for removal (include reason, ex. noncompliance):  none    Medications removed (include reason, ex. therapy complete or physician discontinued):  Rosuvastatin- therapy completed  Multivitamin- non-compliance  Vitamin C- non-compliance  Diltiazem- error    Medications added/doses adjusted:  Basaglar 18 units HS  Novolog 0-7 units TID with meals per patient sliding scale    Other notes (ex. Recent course of antibiotics, Coumadin dosing):  Per patient, has been not using insulin pump due to insurance coverage changes. Has been using Basaglar and sliding scale insulin. Denies use of other OTC or herbal medications. Last dose times updated. Natanael Everett, PharmD  ED Pharmacist [id number]  3/1/2021    No current facility-administered medications on file prior to encounter. Current Outpatient Medications on File Prior to Encounter   Medication Sig Dispense Refill    Cholecalciferol (VITAMIN D3 ULTRA POTENCY) 1.25 MG (38908 UT) TABS Take 50,000 Units by mouth once a week (on Saturdays)      calcitRIOL (ROCALTROL) 0.25 MCG capsule Take 0.25 mcg by mouth daily      insulin aspart (NOVOLOG FLEXPEN) 100 UNIT/ML injection pen Inject 0-7 Units into the skin 3 times daily (before meals) Per patient sliding scale.       insulin glargine (LANTUS;BASAGLAR) 100 UNIT/ML injection pen Inject 18 Units into the skin nightly      carvedilol (COREG) 25 MG tablet TAKE TWO TABLETS BY MOUTH EVERY MORNING AND TAKE ONE TABLET BY MOUTH EVERY EVENING 90 tablet 5    ranolazine (RANEXA) 500 MG extended release tablet TAKE ONE TABLET BY MOUTH TWICE A DAY 60 tablet 2    hydrALAZINE (APRESOLINE) 100 MG tablet TAKE ONE TABLET BY MOUTH THREE TIMES A DAY 42 tablet 0    isosorbide mononitrate (IMDUR) 30 MG extended release tablet Take 1 tablet by mouth daily 14 tablet 0    amLODIPine (NORVASC) 5 MG tablet Take 5 mg by mouth 2 times daily      furosemide (LASIX) 80 MG tablet Take 80 mg by mouth 2 times daily      Methylcobalamin (B-12) 1000 MCG TBDP Place 1,000 mcg under the tongue daily      aspirin 81 MG tablet Take 1 tablet by mouth daily 90 tablet 3    nitroGLYCERIN (NITROSTAT) 0.4 MG SL tablet DISSOLVE 1 TAB UNDER TONGUE FOR CHEST PAIN - IF PAIN REMAINS AFTER 5 MIN, CALL 911 AND REPEAT DOSE. MAX 3 TABS IN 15 MINUTES 25 tablet 0    [DISCONTINUED] BASAGLAR KWIKPEN 100 UNIT/ML injection pen INJECT 20 UNITS UNDER THE SKIN ONCE NIGHTLY WHEN OFF PUMP 5 pen 0    [DISCONTINUED] dilTIAZem HCl Coated Beads (DILTIAZEM CD PO) Take by mouth daily      blood glucose test strips (ACCU-CHEK GUIDE) strip TEST BLOOD SUGAR 6 TIMES A  strip 5    [DISCONTINUED] insulin aspart (NOVOLOG) 100 UNIT/ML injection vial INJECT UNDER THE SKIN 40-50 UNITS DAILY VIA PUMP 2 vial 2    [DISCONTINUED] Ergocalciferol (VITAMIN D2 PO) Take by mouth Once a week on saturdays per dialysis      EPINEPHrine (EPIPEN 2-JESSICA) 0.3 MG/0.3ML SOAJ injection Inject 0.3 mLs into the skin once for 1 dose Use as directed for allergic reaction 0.3 mL 0    [DISCONTINUED] rosuvastatin (CRESTOR) 40 MG tablet TAKE ONE TABLET BY MOUTH EVERY EVENING 90 tablet 0    [DISCONTINUED] glucagon (GLUCAGON EMERGENCY) 1 MG injection 1mg Im as needed 1 mg 3    [DISCONTINUED] Aimsco Ultra Thin Lancets MISC 6 times a day 200 each 3    [DISCONTINUED] Blood Glucose Monitoring Suppl (ACCU-CHEK AIMEE) BEHZAD As needed 1 Device 0    [DISCONTINUED] SOFT TOUCH LANCETS MISC 6 times a day 200 each 1    [DISCONTINUED] blood glucose test strips (ONETOUCH VERIO) strip 1 each by In Vitro route daily 6-7 times a day As needed.  200 each 3    [DISCONTINUED] vitamin C (ASCORBIC ACID) 500 MG tablet Take 500 mg by mouth daily      [DISCONTINUED] Continuous Blood Gluc  (FREESTYLE SAL READER) BEHZAD As needed (Patient not taking: Reported on 2/9/2021) 1 Device 0    [DISCONTINUED] Continuous Blood Gluc Sensor (CrowdPCE SENSOR SYSTEM) MISC Every 10 days (Patient not taking: Reported on 2/9/2021) 3 each 2    [DISCONTINUED] Multiple Vitamins-Minerals (THERAPEUTIC MULTIVITAMIN-MINERALS) tablet Take 1 tablet by mouth daily.

## 2021-03-01 NOTE — ED PROVIDER NOTES
[address] [facility]        Pt Name: Ivory Malhotra  MRN: [id number]  [place] 1965  Date of evaluation: 3/1/2021  Provider: Cat Barfield PA-C  PCP: No primary care provider on file. I have seen and evaluated this patient with my supervising physician Barney Martinez MD.    The total critical care time spent while evaluating and treating this patient was at least 37 minutes. This excludes time spent doing separately billable procedures. This includes time at the bedside, data interpretation, medication management, obtaining critical history from collateral sources if the patient is unable to provide it directly, and physician consultation. Specifics of interventions taken and potentially life-threatening diagnostic considerations are listed above in the medical decision making. CHIEF COMPLAINT       Chief Complaint   Patient presents with    Extremity Weakness     Pt from home, states his glucose was low and he drank OJ, very fatigued, slurred speech, lethargic. HISTORY OF PRESENT ILLNESS   (Location, Timing/Onset, Context/Setting, Quality, Duration, Modifying Factors, Severity, Associated Signs and Symptoms)  Note limiting factors. Ivory Malhotra is a 50 y.o. male that presents to the emergency department with 2 different complaints. Patient is mainly here because about 30 minutes 1 hour before presenting to the emergency department his girlfriend found him fatigued and diaphoretic on the couch and his glucose was in the 40s. She gave him some juice and he was brought to the emergency department. His recheck glucose here is in the 90s. He states \"it feels like everything is just moving slowly. \"  He has history of hypertension, hyperlipidemia, CHF, chronic kidney disease on peritoneal dialysis and diabetes. Patient states he took insulin this morning and only took 3 units. He is unsure of what time this was added.   Secondarily states he woke up at around 9:00 this morning with some left leg weakness and numbness and states his left leg just feels heavy. He went to bed at around 230 yesterday about 12 hours before presenting to the emergency department and states he did not have the symptoms at that time. I was called immediately into the room by nursing staff that brought him to bed 3 in a wheelchair. Required 3 people to get him into the bed as he was so weak he could not even stand up. Denies chest pain, shortness of breath, nausea, vomiting, visual changes, abdominal pain, dysuria, hematuria, diarrhea, bloody stool or any other symptoms. Nursing Notes were all reviewed and agreed with or any disagreements were addressed in the HPI. REVIEW OF SYSTEMS    (2-9 systems for level 4, 10 or more for level 5)     Review of Systems    Positives and Pertinent negatives as per HPI. Except as noted above in the ROS, all other systems were reviewed and negative.        PAST MEDICAL HISTORY     Past Medical History:   Diagnosis Date    [facility] rest [facility])     Cardi[facility] ([facility]     Carotid artery stenosis 10/25/2016    SUZANNA stented with 8 x 30 mm non-tapered Xact stent    CHF (congestive heart failure) ([facility] 3/3/15    EF 30%     CKD (chronic kidney disease) stage 2, GFR 60-89 ml/min     Coronary artery disease     sp CABG UC    Diabetic peripheral neuropathy (HCC)     Diastolic HF (heart failure) (HCC)     Hyperlipidemia with target LDL less than 70     Hypertension goal BP (blood pressure) < 130/80     PVD (peripheral vascular disease) (HCC)     Seizures (HCC)     Type 1 diabetes mellitus with chronic kidney disease (HCC)     c-peptide <0.1 in 2015         SURGICAL HISTORY     Past Surgical History:   Procedure Laterality Date    CARDIAC CATHETERIZATION      CARDIAC SURGERY      triple bypass    CORONARY ARTERY BYPASS GRAFT      HERNIA REPAIR      LAPAROSCOPY INSERTION PERITONEAL CATHETER N/A 6/3/2020    LAPAROSCOPIC PLACEMENT OF PERITONEAL DIALYSIS  CATHETER performed by Muna Santa MD at [address]       Current Discharge Medication List      CONTINUE these medications which have NOT CHANGED    Details   Cholecalciferol (VITAMIN D3 ULTRA POTENCY) 1.25 MG (05216 UT) TABS Take 50,000 Units by mouth once a week (on Saturdays)      calcitRIOL (ROCALTROL) 0.25 MCG capsule Take 0.25 mcg by mouth daily      insulin aspart (NOVOLOG FLEXPEN) 100 UNIT/ML injection pen Inject 0-7 Units into the skin 3 times daily (before meals) Per patient sliding scale. insulin glargine (LANTUS;BASAGLAR) 100 UNIT/ML injection pen Inject 18 Units into the skin nightly      carvedilol (COREG) 25 MG tablet TAKE TWO TABLETS BY MOUTH EVERY MORNING AND TAKE ONE TABLET BY MOUTH EVERY EVENING  Qty: 90 tablet, Refills: 5      ranolazine (RANEXA) 500 MG extended release tablet TAKE ONE TABLET BY MOUTH TWICE A DAY  Qty: 60 tablet, Refills: 2    Associated Diagnoses: Coronary artery disease due to lipid rich plaque      hydrALAZINE (APRESOLINE) 100 MG tablet TAKE ONE TABLET BY MOUTH THREE TIMES A DAY  Qty: 42 tablet, Refills: 0    Comments: Patient is awaiting new insurance to kick in and needs temporary short term refills. Quoted price by tech was $12.98. Thank you.      isosorbide mononitrate (IMDUR) 30 MG extended release tablet Take 1 tablet by mouth daily  Qty: 14 tablet, Refills: 0    Comments: Patient is awaiting new insurance to kick in and needs temporary short term refills. Quoted price by tech was $11.98. Thank you.       amLODIPine (NORVASC) 5 MG tablet Take 5 mg by mouth 2 times daily      furosemide (LASIX) 80 MG tablet Take 80 mg by mouth 2 times daily      Methylcobalamin (B-12) 1000 MCG TBDP Place 1,000 mcg under the tongue daily      aspirin 81 MG tablet Take 1 tablet by mouth daily  Qty: 90 tablet, Refills: 3      nitroGLYCERIN (NITROSTAT) 0.4 MG SL tablet DISSOLVE 1 TAB UNDER TONGUE FOR CHEST PAIN - IF PAIN REMAINS AFTER 5 MIN, CALL 911 AND REPEAT DOSE. MAX 3 TABS IN 15 MINUTES  Qty: 25 tablet, Refills: 0      blood glucose test strips (ACCU-CHEK GUIDE) strip TEST BLOOD SUGAR 6 TIMES A DAY  Qty: 200 strip, Refills: 5      EPINEPHrine (EPIPEN 2-JESSICA) 0.3 MG/0.3ML SOAJ injection Inject 0.3 mLs into the skin once for 1 dose Use as directed for allergic reaction  Qty: 0.3 mL, Refills: 0               ALLERGIES     Iodides, Shellfish-derived products, and Atorvastatin calcium    FAMILYHISTORY       Family History   Problem Relation Age of Onset    Coronary Art Dis Father     Diabetes Father     Coronary Art Dis Paternal Grandmother     Diabetes Paternal Grandmother     Hypertension Mother     Other Mother         Epilepsy    Other Sister         Thyroid disease  Heart murmur    Mult Sclerosis Brother     Arthritis Brother     Sleep Apnea Brother           SOCIAL HISTORY       Social History     Tobacco Use    Smoking status: Never Smoker    Smokeless tobacco: Never Used   Substance Use Topics    Alcohol use: Yes     Comment: one drink a month    Drug use: No       SCREENINGS   NIH Stroke Scale  NIH Stroke Scale Assessed: Yes  Interval: Hand-off/transfer  Level of Consciousness (1a. ): Alert  LOC Questions (1b. ):  Answers both correctly  LOC Commands (1c. ): Performs both tasks correctly  Best Gaze (2. ): Normal  Visual (3. ): No visual loss  Facial Palsy (4. ): Normal symmetrical movement  Motor Arm, Left (5a. ): No drift  Motor Arm, Right (5b. ): No drift  Motor Leg, Left (6a. ): Drift, but does not hit bed  Motor Leg, Right (6b. ): No drift  Limb Ataxia (7. ): Absent  Sensory (8. ): (!) Mild to Moderate  Best Language (9. ): No aphasia  Dysarthria (10. ): Normal  Extinction and Inattention (11): No abnormality  Total: 2Glasgow Coma Scale  Eye Opening: Spontaneous  Best Verbal Response: Oriented  Best Motor Response: Obeys commands  [place] Coma Scale Score: 15        PHYSICAL EXAM    (up to 7 for level 4, 8 or more for level 5)     ED Triage Vitals   BP Temp Temp Source Pulse Resp SpO2 Height Weight   03/01/21 1519 03/01/21 1647 03/01/21 1647 03/01/21 1519 03/01/21 1519 03/01/21 1519 -- 03/01/21 1519   137/68 97.5 °F (36.4 °C) Oral 69 16 100 %  179 lb (81.2 kg)       Physical Exam  Vitals signs and nursing note reviewed. Constitutional:       Appearance: He is well-developed. He is not diaphoretic. HENT:      Head: Atraumatic. Nose: Nose normal.      Mouth/Throat:      Pharynx: No oropharyngeal exudate or posterior oropharyngeal erythema. Eyes:      General:         Right eye: No discharge. Left eye: No discharge. Extraocular Movements: Extraocular movements intact. Pupils: Pupils are equal, round, and reactive to light. Neck:      Musculoskeletal: Normal range of motion. Cardiovascular:      Rate and Rhythm: Normal rate and regular rhythm. Heart sounds: No murmur. No friction rub. No gallop. Pulmonary:      Effort: Pulmonary effort is normal. No respiratory distress. Breath sounds: No stridor. No wheezing, rhonchi or rales. Abdominal:      General: Bowel sounds are normal. There is no distension. Palpations: Abdomen is soft. There is no mass. Tenderness: There is no abdominal tenderness. There is no guarding or rebound. Hernia: No hernia is present. Musculoskeletal: Normal range of motion. General: No swelling. Skin:     General: Skin is warm and dry. Findings: No erythema or rash. Neurological:      Mental Status: He is alert and oriented to person, place, and time. Cranial Nerves: No cranial nerve deficit. Comments: Some mild drift but does not hit the bed of his left leg with some mild sensory deficits of the left leg when compared to the right. Remainder cranial nerves II through XII grossly intact. No dysarthria or aphasia.    Psychiatric:         Behavior: Behavior normal.         DIAGNOSTIC RESULTS LABS:    Labs Reviewed   COMPREHENSIVE METABOLIC PANEL W/ REFLEX TO MG FOR LOW K - Abnormal; Notable for the following components:       Result Value    BUN 44 (*)     CREATININE 7.0 (*)     GFR Non- 8 (*)     GFR  10 (*)     All other components within normal limits    Narrative:     CALL  Smith  [facility] tel. [phone],  Chemistry results called to and read back by reynaldo mansfield,  03/01/2021 16:00, by DECDA  Performed at:  [facility] [address] [place], [place]   Phone [phone]   TROPONIN - Abnormal; Notable for the following components:    Troponin 0.06 (*)     All other components within normal limits    Narrative:     Parul Pace  [facility] tel. [phone],  Chemistry results called to and read back by reynaldo mansfield,  03/01/2021 16:00, by DECDA  Performed at:  [facility] [address], [place]   Phone [phone]   POCT GLUCOSE - Abnormal; Notable for the following components:    POC Glucose 216 (*)     All other components within normal limits    Narrative:     Performed at:  [facility] [address] [place], [place]   Phone [phone]   POCT GLUCOSE - Abnormal; Notable for the following components:    POC Glucose 261 (*)     All other components within normal limits    Narrative:     Performed at:  [facility] [address], [place]   Phone [phone]   CBC WITH AUTO DIFFERENTIAL    Narrative:     Performed at:  [facility] [address] [facility] [address] [place]   Phone [phone]   PROTIME-INR    Narrative:     Performed at:  [facility] [address], [place]   Phone [phone]   CBC   LIPID PANEL   COMPREHENSIVE METABOLIC PANEL   MAGNESIUM   PHOSPHORUS   HEMOGLOBIN A1C   POCT GLUCOSE    Narrative:     Performed at:  [facility] [address] [facility],  [place], [address]   Phone [phone]   POCT GLUCOSE   POCT GLUCOSE   POCT GLUCOSE   POCT GLUCOSE   POCT GLUCOSE   POCT GLUCOSE   POCT GLUCOSE   POCT GLUCOSE   POCT GLUCOSE       All other labs were within normal range or not returned as of this dictation. EKG: All EKG's are interpreted by the Emergency Department Physician in the absence of a cardiologist.  Please see their note for interpretation of EKG. RADIOLOGY:   Non-plain film images such as CT, Ultrasound and MRI are read by the radiologist. Plain radiographic images are visualized and preliminarily interpreted by the ED Provider with the below findings:        Interpretation per the Radiologist below, if available at the time of this note:    XR CHEST PORTABLE   Final Result   No acute cardiopulmonary process. CT HEAD WO CONTRAST   Final Result   No acute intracranial abnormality. Paranasal sinus disease as above. This includes complete opacification of   right frontal sinus to include high attenuation material which likely   reflects inspissated mucus material.  Fungal infection felt much less likely   but suggest clinical correlation. Findings were discussed with Shara Ordaz at 4:00 pm on 3/1/2021. MRI brain without contrast    (Results Pending)     Ct Head Wo Contrast    Result Date: 3/1/2021  EXAMINATION: CT OF THE HEAD WITHOUT CONTRAST  3/1/2021 3:48 pm TECHNIQUE: CT of the head was performed without the administration of intravenous contrast. Dose modulation, iterative reconstruction, and/or weight based adjustment of the mA/kV was utilized to reduce the radiation dose to as low as reasonably achievable. COMPARISON: None HISTORY: ORDERING SYSTEM PROVIDED HISTORY: Left leg weakness TECHNOLOGIST PROVIDED HISTORY: Has a \"code stroke\" or \"stroke alert\" been called? ->Yes Reason for exam:->Left leg weakness Decision Support Exception->Emergency Medical Condition (MA) Reason for Exam: stroke alert Acuity: Acute Type of Exam: Initial FINDINGS: BRAIN/VENTRICLES: There is no acute intracranial hemorrhage, mass effect or midline shift. No abnormal extra-axial fluid collection. The gray-white differentiation is maintained without evidence of an acute infarct. There is no evidence of hydrocephalus. ORBITS: The visualized portion of the orbits demonstrate no acute abnormality. SINUSES: Complete opacification of right frontal sinus, high attenuation. Mucoperiosteal thickening, bubbly mucus-like material, and small air-fluid level to the left sphenoid sinus. Mucoperiosteal thickening to partially imaged bilateral maxillary sinuses, right more than left, with likely some superimposed mucous retention cysts versus sinus polyps. Mastoid air cells and middle ears appear clear. SOFT TISSUES/SKULL:  No acute abnormality of the visualized skull or soft tissues. No acute intracranial abnormality. Paranasal sinus disease as above. This includes complete opacification of right frontal sinus to include high attenuation material which likely reflects inspissated mucus material.  Fungal infection felt much less likely but suggest clinical correlation. Findings were discussed with Julianne Vasquez at 4:00 pm on 3/1/2021. Xr Chest Portable    Result Date: 3/1/2021  EXAMINATION: ONE XRAY VIEW OF THE CHEST 3/1/2021 3:42 pm COMPARISON: May 22, 2020 HISTORY: ORDERING SYSTEM PROVIDED HISTORY: Left leg weakness TECHNOLOGIST PROVIDED HISTORY: Reason for exam:->Left leg weakness Reason for Exam: Extremity Weakness Acuity: Acute Type of Exam: Initial FINDINGS: The cardiomediastinal silhouette is normal in size. Stable post sternotomy changes are present. The lungs are clear. No pleural effusion or pneumothorax is identified. No acute cardiopulmonary process.            PROCEDURES   Unless otherwise noted below, none generally weak. Was initially hyperglycemic at home but his glucose is now in the 90s. Required a 3 person assist just to get him out of the wheelchair into the bed. Patient states he did wake up this morning with some tingling in his left leg and does have some mild drift and sensory deficits there. Due to the stroke alert was called. After talking with stroke team however he is not a TPA candidate not only because of nondebilitating symptoms but also has hypoglycemia earlier. Patient is feeling slightly better during his stay here but still has some mild drift and sensory deficits in his left leg. Did not undergo CTA because patient does have history of anaphylaxis with IV dye that is required multiple day premedication on a contrasted study had after this. Discussed this case with hospitalist moment for further work-up or treatment. He does have end-stage renal disease on peritoneal dialysis. Rest of his laboratory testing is unremarkable. Concern for CVA. Was given aspirin. FINAL IMPRESSION      1. Left leg weakness    2. Hypoglycemia    3. End-stage renal disease on peritoneal dialysis [facility])          DISPOSITION/PLAN   DISPOSITION Admitted 03/01/2021 05:57:51 PM      PATIENT REFERREDTO:  No follow-up provider specified.     DISCHARGE MEDICATIONS:  Current Discharge Medication List          DISCONTINUED MEDICATIONS:  Current Discharge Medication List                 (Please note that portions of this note were completed with a voice recognition program.  Efforts were made to edit the dictations but occasionally words are mis-transcribed.)    Antonio Fry PA-C (electronically signed)           Antonio Fry PA-C  03/02/21 0030

## 2021-03-01 NOTE — PROGRESS NOTES
Pt seen in  ED, admission completed. Pt is alert and oriented x 3. Pt lives at home with his wife, and is being admitted for extremity weakness/ stoke r/o. Plan of care updated, all questions answered.

## 2021-03-01 NOTE — ED NOTES
Pt and family provided warm blankets. Deny further needs. Pt remains connected to cardiac monitor with call light in reach. Continuing to await IP bed.      Jason Schmitt RN  03/01/21 [id number]

## 2021-03-01 NOTE — ED NOTES
Pt arrived to ED reporting weakness and feeling off since about 1430. Pt states while lying there he became diaphoretic and felt \"goofy\". Pt states when he checked his sugar it was 47. Pt got sugar to come up and states symptoms were slightly going away. Pt reports mild CP, tingling and weakness down left arm. Scoring 2 on NIHSS. Pt states he is feeling better and is currently alert and oriented x 4. IV initiated and pt sent to CT.         Amol Elias RN  03/01/21 6945

## 2021-03-02 ENCOUNTER — APPOINTMENT (OUTPATIENT)
Dept: MRI IMAGING | Age: 56
DRG: 069 | End: 2021-03-02
Payer: COMMERCIAL

## 2021-03-02 LAB
A/G RATIO: 1.4 (ref 1.1–2.2)
ALBUMIN SERPL-MCNC: 4.1 G/DL (ref 3.4–5)
ALP BLD-CCNC: 129 U/L (ref 40–129)
ALT SERPL-CCNC: 9 U/L (ref 10–40)
ANION GAP SERPL CALCULATED.3IONS-SCNC: 11 MMOL/L (ref 3–16)
AST SERPL-CCNC: 10 U/L (ref 15–37)
BILIRUB SERPL-MCNC: 0.3 MG/DL (ref 0–1)
BUN BLDV-MCNC: 47 MG/DL (ref 7–20)
CALCIUM SERPL-MCNC: 8.9 MG/DL (ref 8.3–10.6)
CHLORIDE BLD-SCNC: 96 MMOL/L (ref 99–110)
CHOLESTEROL, TOTAL: 210 MG/DL (ref 0–199)
CO2: 27 MMOL/L (ref 21–32)
CREAT SERPL-MCNC: 6.7 MG/DL (ref 0.9–1.3)
EKG ATRIAL RATE: 66 BPM
EKG DIAGNOSIS: NORMAL
EKG P AXIS: 66 DEGREES
EKG P-R INTERVAL: 144 MS
EKG Q-T INTERVAL: 466 MS
EKG QRS DURATION: 94 MS
EKG QTC CALCULATION (BAZETT): 488 MS
EKG R AXIS: 51 DEGREES
EKG T AXIS: 81 DEGREES
EKG VENTRICULAR RATE: 66 BPM
GFR AFRICAN AMERICAN: 10
GFR NON-AFRICAN AMERICAN: 9
GLOBULIN: 3 G/DL
GLUCOSE BLD-MCNC: 157 MG/DL (ref 70–99)
GLUCOSE BLD-MCNC: 263 MG/DL (ref 70–99)
GLUCOSE BLD-MCNC: 273 MG/DL (ref 70–99)
GLUCOSE BLD-MCNC: 282 MG/DL (ref 70–99)
GLUCOSE BLD-MCNC: 325 MG/DL (ref 70–99)
GLUCOSE BLD-MCNC: 356 MG/DL (ref 70–99)
GLUCOSE BLD-MCNC: 388 MG/DL (ref 70–99)
HBV SURFACE AB TITR SER: >1000 MIU/ML
HCT VFR BLD CALC: 46.7 % (ref 40.5–52.5)
HDLC SERPL-MCNC: 40 MG/DL (ref 40–60)
HEMOGLOBIN: 15.1 G/DL (ref 13.5–17.5)
LDL CHOLESTEROL CALCULATED: 153 MG/DL
LV EF: 33 %
LVEF MODALITY: NORMAL
MAGNESIUM: 2.2 MG/DL (ref 1.8–2.4)
MCH RBC QN AUTO: 28.5 PG (ref 26–34)
MCHC RBC AUTO-ENTMCNC: 32.4 G/DL (ref 31–36)
MCV RBC AUTO: 87.9 FL (ref 80–100)
PDW BLD-RTO: 14.8 % (ref 12.4–15.4)
PERFORMED ON: ABNORMAL
PHOSPHORUS: 3.5 MG/DL (ref 2.5–4.9)
PLATELET # BLD: 146 K/UL (ref 135–450)
PMV BLD AUTO: 9.3 FL (ref 5–10.5)
POTASSIUM SERPL-SCNC: 4.3 MMOL/L (ref 3.5–5.1)
RBC # BLD: 5.31 M/UL (ref 4.2–5.9)
SODIUM BLD-SCNC: 134 MMOL/L (ref 136–145)
TOTAL PROTEIN: 7.1 G/DL (ref 6.4–8.2)
TRIGL SERPL-MCNC: 85 MG/DL (ref 0–150)
TROPONIN: 0.05 NG/ML
VLDLC SERPL CALC-MCNC: 17 MG/DL
WBC # BLD: 4.4 K/UL (ref 4–11)

## 2021-03-02 PROCEDURE — 97162 PT EVAL MOD COMPLEX 30 MIN: CPT

## 2021-03-02 PROCEDURE — 86706 HEP B SURFACE ANTIBODY: CPT

## 2021-03-02 PROCEDURE — 2060000000 HC ICU INTERMEDIATE R&B

## 2021-03-02 PROCEDURE — 99223 1ST HOSP IP/OBS HIGH 75: CPT | Performed by: PSYCHIATRY & NEUROLOGY

## 2021-03-02 PROCEDURE — 97116 GAIT TRAINING THERAPY: CPT

## 2021-03-02 PROCEDURE — 6360000004 HC RX CONTRAST MEDICATION: Performed by: INTERNAL MEDICINE

## 2021-03-02 PROCEDURE — 6370000000 HC RX 637 (ALT 250 FOR IP): Performed by: INTERNAL MEDICINE

## 2021-03-02 PROCEDURE — 2580000003 HC RX 258: Performed by: INTERNAL MEDICINE

## 2021-03-02 PROCEDURE — 36415 COLL VENOUS BLD VENIPUNCTURE: CPT

## 2021-03-02 PROCEDURE — 70551 MRI BRAIN STEM W/O DYE: CPT

## 2021-03-02 PROCEDURE — 92610 EVALUATE SWALLOWING FUNCTION: CPT

## 2021-03-02 PROCEDURE — 83735 ASSAY OF MAGNESIUM: CPT

## 2021-03-02 PROCEDURE — 97530 THERAPEUTIC ACTIVITIES: CPT

## 2021-03-02 PROCEDURE — 6370000000 HC RX 637 (ALT 250 FOR IP): Performed by: NURSE PRACTITIONER

## 2021-03-02 PROCEDURE — 96372 THER/PROPH/DIAG INJ SC/IM: CPT

## 2021-03-02 PROCEDURE — 93010 ELECTROCARDIOGRAM REPORT: CPT | Performed by: INTERNAL MEDICINE

## 2021-03-02 PROCEDURE — 83036 HEMOGLOBIN GLYCOSYLATED A1C: CPT

## 2021-03-02 PROCEDURE — 85027 COMPLETE CBC AUTOMATED: CPT

## 2021-03-02 PROCEDURE — 6360000002 HC RX W HCPCS: Performed by: INTERNAL MEDICINE

## 2021-03-02 PROCEDURE — 84100 ASSAY OF PHOSPHORUS: CPT

## 2021-03-02 PROCEDURE — 97535 SELF CARE MNGMENT TRAINING: CPT

## 2021-03-02 PROCEDURE — C8929 TTE W OR WO FOL WCON,DOPPLER: HCPCS

## 2021-03-02 PROCEDURE — 87340 HEPATITIS B SURFACE AG IA: CPT

## 2021-03-02 PROCEDURE — 84484 ASSAY OF TROPONIN QUANT: CPT

## 2021-03-02 PROCEDURE — 80061 LIPID PANEL: CPT

## 2021-03-02 PROCEDURE — 93880 EXTRACRANIAL BILAT STUDY: CPT

## 2021-03-02 PROCEDURE — 90945 DIALYSIS ONE EVALUATION: CPT

## 2021-03-02 PROCEDURE — 92526 ORAL FUNCTION THERAPY: CPT

## 2021-03-02 PROCEDURE — 97165 OT EVAL LOW COMPLEX 30 MIN: CPT

## 2021-03-02 PROCEDURE — 80053 COMPREHEN METABOLIC PANEL: CPT

## 2021-03-02 RX ORDER — INSULIN LISPRO 100 [IU]/ML
8 INJECTION, SOLUTION INTRAVENOUS; SUBCUTANEOUS ONCE
Status: COMPLETED | OUTPATIENT
Start: 2021-03-02 | End: 2021-03-02

## 2021-03-02 RX ORDER — INSULIN LISPRO 100 [IU]/ML
0-6 INJECTION, SOLUTION INTRAVENOUS; SUBCUTANEOUS NIGHTLY
Status: DISCONTINUED | OUTPATIENT
Start: 2021-03-02 | End: 2021-03-04

## 2021-03-02 RX ORDER — INSULIN LISPRO 100 [IU]/ML
0-12 INJECTION, SOLUTION INTRAVENOUS; SUBCUTANEOUS
Status: DISCONTINUED | OUTPATIENT
Start: 2021-03-02 | End: 2021-03-04

## 2021-03-02 RX ORDER — CALCITRIOL 0.25 UG/1
0.25 CAPSULE, LIQUID FILLED ORAL DAILY
Status: DISCONTINUED | OUTPATIENT
Start: 2021-03-02 | End: 2021-03-05 | Stop reason: HOSPADM

## 2021-03-02 RX ORDER — ROSUVASTATIN CALCIUM 40 MG/1
40 TABLET, COATED ORAL NIGHTLY
Status: DISCONTINUED | OUTPATIENT
Start: 2021-03-02 | End: 2021-03-05 | Stop reason: HOSPADM

## 2021-03-02 RX ADMIN — CALCITRIOL 0.25 MCG: 0.25 CAPSULE ORAL at 15:23

## 2021-03-02 RX ADMIN — INSULIN LISPRO 1 UNITS: 100 INJECTION, SOLUTION INTRAVENOUS; SUBCUTANEOUS at 22:32

## 2021-03-02 RX ADMIN — INSULIN LISPRO 6 UNITS: 100 INJECTION, SOLUTION INTRAVENOUS; SUBCUTANEOUS at 15:23

## 2021-03-02 RX ADMIN — ROSUVASTATIN CALCIUM 40 MG: 40 TABLET, FILM COATED ORAL at 22:10

## 2021-03-02 RX ADMIN — INSULIN GLARGINE 15 UNITS: 100 INJECTION, SOLUTION SUBCUTANEOUS at 22:31

## 2021-03-02 RX ADMIN — INSULIN LISPRO 3 UNITS: 100 INJECTION, SOLUTION INTRAVENOUS; SUBCUTANEOUS at 09:01

## 2021-03-02 RX ADMIN — INSULIN LISPRO 8 UNITS: 100 INJECTION, SOLUTION INTRAVENOUS; SUBCUTANEOUS at 03:02

## 2021-03-02 RX ADMIN — HEPARIN SODIUM 5000 UNITS: 5000 INJECTION INTRAVENOUS; SUBCUTANEOUS at 15:23

## 2021-03-02 RX ADMIN — Medication 10 ML: at 09:06

## 2021-03-02 RX ADMIN — PERFLUTREN 1.43 MG: 6.52 INJECTION, SUSPENSION INTRAVENOUS at 13:05

## 2021-03-02 RX ADMIN — RANOLAZINE 500 MG: 500 TABLET, FILM COATED, EXTENDED RELEASE ORAL at 22:10

## 2021-03-02 RX ADMIN — HEPARIN SODIUM 5000 UNITS: 5000 INJECTION INTRAVENOUS; SUBCUTANEOUS at 22:31

## 2021-03-02 RX ADMIN — ASPIRIN 81 MG: 81 TABLET ORAL at 09:01

## 2021-03-02 RX ADMIN — INSULIN LISPRO 8 UNITS: 100 INJECTION, SOLUTION INTRAVENOUS; SUBCUTANEOUS at 17:00

## 2021-03-02 RX ADMIN — RANOLAZINE 500 MG: 500 TABLET, FILM COATED, EXTENDED RELEASE ORAL at 09:01

## 2021-03-02 RX ADMIN — Medication 10 ML: at 22:10

## 2021-03-02 RX ADMIN — HEPARIN SODIUM 5000 UNITS: 5000 INJECTION INTRAVENOUS; SUBCUTANEOUS at 05:49

## 2021-03-02 RX ADMIN — POLYETHYLENE GLYCOL 3350 17 G: 17 POWDER, FOR SOLUTION ORAL at 22:44

## 2021-03-02 RX ADMIN — CLOPIDOGREL BISULFATE 75 MG: 75 TABLET ORAL at 09:01

## 2021-03-02 ASSESSMENT — ENCOUNTER SYMPTOMS
COUGH: 0
CHEST TIGHTNESS: 0
STRIDOR: 0
VOMITING: 0
BACK PAIN: 0
ABDOMINAL PAIN: 0
SHORTNESS OF BREATH: 0
NAUSEA: 0
PHOTOPHOBIA: 0
EYE PAIN: 0
DIARRHEA: 0
CONSTIPATION: 0
SORE THROAT: 0
RHINORRHEA: 0
EYE REDNESS: 0

## 2021-03-02 NOTE — PROGRESS NOTES
This Rn spoke to [facility] at this time so patient can be set up on PD cycler overnight. [facility] received orders and will send someone out.

## 2021-03-02 NOTE — CONSULTS
In patient Neurology consult        [facility] Neurology      MD Marion Hernandez  1965    Date of Service: 3/2/2021    Referring Physician: Jimmie Lord MD      Reason for the consult and CC: New onset left-sided weakness and numbness    HPI:   The patient is a 50y.o.  years old male with history of diabetes, hypertension and chronic kidney disease who was admitted to the hospital yesterday with new onset left-sided weakness and numbness. Symptoms started 2 days ago. He describes sudden onset of numbness and with weakness in his left leg then spread to involve his left arm and occasional lower face. He was unable to walk by himself. Degree was severe. Duration was persistent. No chest pain or headache or dysphagia or dysarthria. No clear triggers or other associated symptoms. No relieving or aggravating factors. Patient came to the ED for evaluation. Initial work-up with CT of the head showed no acute findings. Patient was admitted. Today he is about the same. The same left-sided weakness and numbness. He is diabetic. He lost his insurance few weeks ago and he was not consistent with taking his diabetic medication. He was not consistent with even the blood thinners. He was not taking aspirin daily. Last A1c 7.7. Blood test today reviewed and showed sodium 134 and potassium 4.3. Creatinine 6.7. Blood pressure has been in the 081-031 systolic. Other review of system was unremarkable.       Family History   Problem Relation Age of Onset    Coronary Art Dis Father     Diabetes Father     Coronary Art Dis Paternal Grandmother     Diabetes Paternal Grandmother     Hypertension Mother     Other Mother         Epilepsy    Other Sister         Thyroid disease  Heart murmur    Mult Sclerosis Brother     Arthritis Brother     Sleep Apnea Brother      Past Surgical History:   Procedure Laterality Date    CARDIAC CATHETERIZATION      CARDIAC SURGERY      triple bypass  CORONARY ARTERY BYPASS GRAFT      HERNIA REPAIR      LAPAROSCOPY INSERTION PERITONEAL CATHETER N/A 6/3/2020    LAPAROSCOPIC PLACEMENT OF PERITONEAL DIALYSIS  CATHETER performed by Apurva Solano MD at [facility]          Past Medical History:   Diagnosis Date    Angina at rest [facility])     Cardiomy[facility] [address]     Carotid artery stenosis 10/25/2016    SUZANNA stented with 8 x 30 mm non-tapered Xact stent    CHF (congestive heart failure) ([facility] [address] 3/3/15    EF 30%     CKD (chronic kidney disease) stage 2, GFR 60-89 ml/min     Coronary artery disease     sp CABG UC    Diabetic peripheral neuropathy (HCC)     Diastolic HF (heart failure) ([facility]     Hyperlipidemia with target LDL less than 70     Hypertension goal BP (blood pressure) < 130/80     PVD (peripheral vascular disease) ([facility]     Seizures ([facility])     Type 1 diabetes mellitus with chronic kidney disease ([facility])     c-peptide <0.1 in 2015     Social History     Tobacco Use    Smoking status: Never Smoker    Smokeless tobacco: Never Used   Substance Use Topics    Alcohol use: Yes     Comment: one drink a month    Drug use: No     Allergies   Allergen Reactions    Iodides Anaphylaxis    Shellfish-Derived Products Anaphylaxis    Atorvastatin Calcium Rash     Current Facility-Administered Medications   Medication Dose Route Frequency Provider Last Rate Last Admin    rosuvastatin (CRESTOR) tablet 40 mg  40 mg Oral Nightly Shiloh Garcia MD        insulin glargine (LANTUS;BASAGLAR) injection pen 15 Units  15 Units Subcutaneous Nightly Kyle Pacheco MD        insulin lispro (1 Unit Dial) 0-12 Units  0-12 Units Subcutaneous TID  Malachi Ceja MD        insulin lispro (1 Unit Dial) 0-6 Units  0-6 Units Subcutaneous Nightly Kyle Pacheco MD        calcitRIOL (ROCALTROL) capsule 0.25 mcg  0.25 mcg Oral Daily Renee Gold MD        glucose (GLUTOSE) 40 % oral gel 15 g  15 g Oral PRN MD Darrian Byrnes 03/02/2021    RDW 14.8 03/02/2021     03/02/2021     Lab Results   Component Value Date    INR 1.04 03/01/2021    PROTIME 12.1 03/01/2021       Neuroimaging were independently reviewed by myself and discussed results with the patient  Reviewed notes from different physicians  Reviewed lab and blood testing    Impression:  New onset left-sided weakness and paresthesia. Could be secondary new ischemic right hemispheric CVA. Thromboembolic versus cardioembolic  Diabetes, not controlled  Hypertension, not controlled  End-stage renal disease  Hyperlipidemia  Elevated troponin  Hyponatremia    Recommendation:  MRI brain  Echo  Carotid Doppler  PT and OT  Telemetry  Aspirin and Plavix  DVT and GI prophylaxis  Insulin sliding scale  Statin  Lipid panel   Blood pressure monitor  Can restart home blood pressure medications  Hold if blood pressure below 130-80  Speech evaluation  Follow troponin  Follow sodium level  Continue PD  Long discussion with the patient today regarding stroke prevention, risk of recurrence and risk of poorly controlled diabetes and hypertension  Will follow  MDM: High complexity due to recent stroke, multiple comorbid medical illnesses that are not controlled and risk of stroke recurrence. Thank you for referring such patient. If you have any questions regarding my consult note, please don't hesitate to call me. Jesus Nguyen MD  [phone]    This dictation was generated by voice recognition computer software.  Although all attempts are made to edit the dictation for accuracy, there may be errors in the  transcription that are not intended

## 2021-03-02 NOTE — FLOWSHEET NOTE
Treatment time: 9 Hours 8 minutes  Net UF: 705 ml  Dwell Time: 0 minutes lost    Treatment completed without complications or complaints from patient. Effluent clear/yellow and lines taped to patient per protocol. Patient resting comfortably with VSS upon exiting room. Copy of dialysis treatment record placed in chart, to be scanned into EMR and report given to Vivian Benson RN.

## 2021-03-02 NOTE — PROGRESS NOTES
Patient stated he [facility]. Per patient he receives: 2200 ml per session for 3 sessions overnight that lasts 2.5 hrs each. Patient uses 2.5% fluid.

## 2021-03-02 NOTE — CARE COORDINATION
Discharge Planning Assessment    SW discharge planner met with patient to discuss reason for admission, current living situation, and potential needs at the time of discharge. Demographics/Insurance verified:  Yes. Pt will be staying with girlfriend in [place]. Address is. .. [address], [address]    Current type of dwelling:  House - ok w/stairs    Living arrangements:  w/girlfriend    Level of function/Support:  Pt reported he is weak but ambulating independently. Girlfriend is supportive if he has needs. PCP:  Dr. Mandy Barber    Last Visit to PCP:  Stated he has an appointment to see him for the first time at the end of the month. DME:  None. No needs identified. Active with any community resources/agencies/skilled home care:  None. Pt reported need to find a dental agency that will assist with his teeth and gums. Pt stated he has cracked teeth and deteriorating gums d/t diabetes disease. Pt stated his Rick[employer] is with [address] [place] [address] when he lost his job and they do not assist with Dental services. Also, pt's Medicare does not assist with Dental.  Provided pt with Dental OPTIONS program phone number informing of program and encouraging to call. Medication compliance issues:  No    Financial issues that could impact healthcare:  No    Tentative discharge plan:  Home w/outpatient PT/OT. [address] [facility] Outpatient therapy and set up pt's first appointment for Monday, 3/8/21, at 12pm.  Informed pt of day and time of appointment. Completed this information on the [address] [place] [address]. Provided pt with Dental OPTIONS program information. Discussed with patient and/or family that on the day of discharge home tentative time of discharge will be between 10 AM and noon. Transportation at the time of discharge:  Pt drives. Girlfriend can assist home.     Electronically signed by ROBIN Luong, TEJAW on 3/2/2021 at 4:53 PM

## 2021-03-02 NOTE — FLOWSHEET NOTE
CCPD Order   Exchanges: 3   Exchange Volume: 2200 ml   Total Time: 9 hrs   Dextrose: 2.5%   Last Fill: 0 ml   Total Volume: 6600 ml     Orders verified. Supplies taken to pt's room. Report received. Cycler set up, primed and pre tested. Dressing changed on [facility] site. Pt connected to cycler. CCPD initiated without problem. Initial effluent clear.         03/02/21 0049   Vitals   BP (!) 159/74   Temp 98.4 °F (36.9 °C)   Temp Source Oral   Pulse 91   Resp 18   SpO2 95 %   Weight 190 lb 7.6 oz (86.4 kg)   Cycler   Verification of Prescription CCPD   Total Volume Programmed 6600 mL   Therapy Time (Hours:Minutes) 9:00   Fill Volume 2200 mL   Last Fill Volume 0 mL   Dextrose Setting Same (Nonextraneal)   Number of Cycles 3   Bag Volume 5000 mL   Number of Bags Used 2   Dianeal Solution Other (Comment)  (Delflex 2.5% in 5000 ml)   I Drain (mL) 2 mL

## 2021-03-02 NOTE — PROGRESS NOTES
[facility] PROGRESS NOTE    3/2/2021 10:40 AM        Name: John Randle .              Admitted: 3/1/2021  Primary Care Provider: No primary care provider on file. (Tel: None)          Subjective:    Patient lying in bed feeling much better still having some numbness in left hand and left leg foot weakness mostly resolved.   Any chest pain shortness of breath fever chills    Reviewed interval ancillary notes    Current Medications      rosuvastatin (CRESTOR) tablet 40 mg, Nightly      insulin glargine (LANTUS;BASAGLAR) injection pen 15 Units, Nightly      insulin lispro (1 Unit Dial) 0-12 Units, TID WC      insulin lispro (1 Unit Dial) 0-6 Units, Nightly      glucose (GLUTOSE) 40 % oral gel 15 g, PRN      dextrose 50 % IV solution, PRN      glucagon (rDNA) injection 1 mg, PRN      dextrose 5 % solution, PRN      sodium chloride flush 0.9 % injection 10 mL, 2 times per day      sodium chloride flush 0.9 % injection 10 mL, PRN      promethazine (PHENERGAN) tablet 12.5 mg, Q6H PRN    Or      ondansetron (ZOFRAN) injection 4 mg, Q6H PRN      polyethylene glycol (GLYCOLAX) packet 17 g, Daily PRN      aspirin EC tablet 81 mg, Daily    Or      aspirin suppository 300 mg, Daily      labetalol (NORMODYNE;TRANDATE) injection 10 mg, Q10 Min PRN      heparin (porcine) injection 5,000 Units, 3 times per day      ranolazine (RANEXA) extended release tablet 500 mg, BID      clopidogrel (PLAVIX) tablet 75 mg, Daily        Objective:  BP (!) 157/81   Pulse 82   Temp 98.5 °F (36.9 °C) (Oral)   Resp 16   Ht 5' 7.5\" (1.715 m)   Wt 189 lb 9.5 oz (86 kg)   SpO2 98%   BMI 29.26 kg/m²     Intake/Output Summary (Last 24 hours) at 3/2/2021 1040  Last data filed at 3/2/2021 0049  Gross per 24 hour   Intake --   Output 502 ml   Net -502 ml      Wt Readings from Last 3 Encounters:   03/02/21 189 lb 9.5 oz (86 kg)   02/09/21 197 lb 12.8 oz (89.7 kg)   12/29/20 200 lb (90.7 kg)       General appearance:  Appears comfortable. AAOx3  HEENT: atraumatic, Pupils equal, muscous membranes moist, no masses appreciated  Cardiovascular: Regular rate and rhythm no murmurs appreciated  Respiratory: CTAB no wheezing  Gastrointestinal: Abdomen soft, non-tender, BS+ pd site clean  EXT: no edema  Neurology: Left hand 4-5 right and 5 out of 5 left leg 3 out of 5 right leg 5 out of 5 strength no dysmetria  Psychiatry: Appropriate affect. Not agitated  Skin: Warm, dry, no rashes appreciated    Labs and Tests:  CBC:   Recent Labs     03/01/21  1528 03/02/21  0459   WBC 4.2 4.4   HGB 15.9 15.1    146     BMP:    Recent Labs     03/01/21  1528 03/02/21  0459    134*   K 4.6 4.3   CL 99 96*   CO2 25 27   BUN 44* 47*   CREATININE 7.0* 6.7*   GLUCOSE 75 356*     Hepatic:   Recent Labs     03/01/21  1528 03/02/21  0459   AST 15 10*   ALT 11 9*   BILITOT 0.3 0.3   ALKPHOS 129 129     XR CHEST PORTABLE   Final Result   No acute cardiopulmonary process. CT HEAD WO CONTRAST   Final Result   No acute intracranial abnormality. Paranasal sinus disease as above. This includes complete opacification of   right frontal sinus to include high attenuation material which likely   reflects inspissated mucus material.  Fungal infection felt much less likely   but suggest clinical correlation. Findings were discussed with [facility] at 4:00 pm on 3/1/2021. MRI brain without contrast    (Results Pending)   VL DUP CAROTID BILATERAL    (Results Pending)       Recent imaging reviewed    Problem List  Active Problems:    Left leg weakness  Resolved Problems:    * No resolved hospital problems. *       Assessment & Plan:   ? CVA : with resolving leg leg weakness  - echo carotid us mri brain pending  - neuro consult  - permissive htn  - pt ot  - asa + statin    Dysphagia secondary to above  - speech dysphagia 3 diet    Dm 3 with hypogylcemia and acute metabolic encephalopathy  - improved  - start reduced lantus    ESRD on PD  - nephro consulted        Diet: DIET DYSPHAGIA SOFT AND BITE-SIZED;  Carb Control: 4 carb choices (60 gms)/meal; No Drinking Straw  Code:Full Code  DVT PPXheparin  Disposition pt ot and wokr up pending      Vishal Ordoñez MD   3/2/2021 10:40 AM

## 2021-03-02 NOTE — PROGRESS NOTES
Physical Therapy    Facility/Department: [address]  Initial Assessment    NAME: Estefany Cordero  : 1965  MRN: [id number]    Date of Service: 3/2/2021    Discharge Recommendations:  Estefany Cordero scored a 19/24 on the AM-PAC short mobility form. Current research shows that an AM-PAC score of 18 or greater is typically associated with a discharge to the patient's home setting. Based on the patient's AM-PAC score and their current functional mobility deficits, it is recommended that the patient have 2-3 sessions per week of Physical Therapy at d/c to increase the patient's independence. At this time, this patient demonstrates the endurance and safety to discharge home with home services and a follow up treatment frequency of 2-3x/wk. Please see assessment section for further patient specific details. If patient discharges prior to next session this note will serve as a discharge summary. Please see below for the latest assessment towards goals. HOME HEALTH CARE: LEVEL 1 STANDARD    - Initial home health evaluation to occur within 24-48 hours, in patient home   - Therapy to evaluate with goal of regaining prior level of functioning   - Therapy to evaluate if patient has [address] needs for personal care      S Level 1, 2-3 sessions per week, Patient would benefit from continued therapy after discharge   PT Equipment Recommendations  Equipment Needed: No    Assessment   Body structures, Functions, Activity limitations: Decreased functional mobility ; Decreased strength;Decreased endurance;Decreased balance  Assessment: Pt tolerates PT evaluation well this date. Pt presents with subtle LLE weakness and fatigue noted throughout duration of evaluation. Pt requires CGA-SBA for OOB mobility without AD with cues for gluteal and core activation to improve posture and stability. Pt will continue to benefit from skilled therapy services to improve independence.   Treatment Diagnosis: left leg weakness  Prognosis: Good  Decision Making: Medium Complexity  History: carotid artery disease, chronic diastolic CHF, end-stage renal disease on peritoneal dialysis, history of CAD status post CABG, diabetes type 1, seizure disorder  Exam: MMT, transfers, gait training, stair training  Clinical Presentation: evolving  PT Education: Goals;PT Role;Plan of Care;Transfer Training;Gait Training;Functional Mobility Training  REQUIRES PT FOLLOW UP: Yes  Activity Tolerance  Activity Tolerance: Patient Tolerated treatment well       Patient Diagnosis(es): The primary encounter diagnosis was Left leg weakness. Diagnoses of Hypoglycemia and End-stage renal disease on peritoneal dialysis [facility]) were also pertinent to this visit. has a past medical history of Angina at rest [facility]), Cardiomyopathy [facility]), Carotid artery stenosis, CHF (congestive heart failure) ([facility] [address] CKD (chronic kidney disease) stage 2, GFR 60-89 ml/min, Coronary artery disease, Diabetic peripheral neuropathy ([facility] [address] Diastolic HF (heart failure) ([facility] [address] Hyperlipidemia with target LDL less than 70, Hypertension goal BP (blood pressure) < 130/80, PVD (peripheral vascular disease) ([facility] [address] Seizures ([facility] [address] and Type 1 diabetes mellitus with chronic kidney disease ([facility] [address] has a past surgical history that includes Cardiac surgery; Coronary artery bypass graft; Cardiac catheterization; hernia repair; Tonsillectomy; and LAPAROSCOPY INSERTION PERITONEAL CATHETER (N/A, 6/3/2020). Restrictions  Restrictions/Precautions  Restrictions/Precautions: Fall Risk, Modified Diet  Required Braces or Orthoses?: No  Position Activity Restriction  Other position/activity restrictions: This is a pleasant 50 y.o. male with history carotid artery disease, chronic diastolic CHF, end-stage renal disease on peritoneal dialysis, history of CAD status post CABG, diabetes type 1, seizure disorder, who presents to the emergency room for evaluation of possible CVA.   Patient was last known well at 2:30 AM, woke up around 9:00 this morning with left leg numbness and weakness, slurred speech, lethargy. He also reports generalized weakness, diaphoresis blood glucose was noted to be 47 when checked by his girlfriend. He was administered some juice and on presentation to the emergency room, glucose has since improved. However, he reports persistent left leg weakness and numbness. Stroke alert was called, not a candidate for TPA, given onset of symptoms appears to be outside of window. Hospital medicine service consulted for admission for further evaluation. MRI ordered but has not yet been completed  Vision/Hearing  Vision: Impaired  Vision Exceptions: Wears glasses at all times  Hearing: Within functional limits     Subjective  General  Chart Reviewed: Yes  Patient assessed for rehabilitation services?: Yes  Pain Screening  Patient Currently in Pain: Denies  Vital Signs  Patient Currently in Pain: Denies       Orientation     Social/Functional History  Social/Functional History  Lives With: Significant other(Pt has his own residence, but has been staying with GF for the last month due to issues with blood sugar; pt plans to returns to GF's residence to recover following hospitalization)  Type of Home: House  Home Layout: Multi-level  Home Access: Stairs to enter with rails  Entrance Stairs - Number of Steps: 6 + 6 CHARO from garage  Entrance Stairs - Rails: Both  Bathroom Shower/Tub: Walk-in shower  Bathroom Toilet: Standard  Bathroom Accessibility: Not accessible  ADL Assistance: Independent  Homemaking Assistance: Independent  Homemaking Responsibilities: Yes  Ambulation Assistance: Independent  Transfer Assistance: Independent  Active : Yes  Occupation: Unemployed  [address]: spend time with GF, movies  Additional Comments: Pt reports no recent falls.   Cognition   Cognition  Overall Cognitive Status: WFL    Objective             Strength RLE  Strength RLE: WNL  Strength LLE  Strength LLE: WFL  Comment: no discrepencies noted with MMT testing for functional mobility oobservation, however LLE appears to fatigue more quickly     Sensation  Overall Sensation Status: Impaired(n/t in fingertips, improved from whole LUE.  LLE feels \"heavy\")  Bed mobility  Supine to Sit: Supervision  Scooting: Supervision  Transfers  Sit to Stand: Stand by assistance  Stand to sit: Stand by assistance  Bed to Chair: Stand by assistance  Ambulation  Ambulation?: Yes  Ambulation 1  Surface: level tile  Device: No Device  Assistance: Contact guard assistance;Stand by assistance(SBA overall, periods of CGA when increased balance deficits noted)  Gait Deviations: Slow Janae;Decreased step length(widened TIERA)  Distance: 200'  Comments: cuing for gluteal and core activation while ambulating  Stairs/Curb  Stairs?: Yes  Stairs  # Steps : 10  Stairs Height: 6\"  Rails: Bilateral  Assistance: Contact guard assistance  Comment: cuing for step to pattern     Balance  Posture: Good  Sitting - Static: Good(supervision)  Sitting - Dynamic: Good(supervision)  Standing - Static: Fair(SBA)  Standing - Dynamic: Fair(SBA-CGA)        Plan   Plan  Times per week: 2-3  Times per day: Daily  Current Treatment Recommendations: Strengthening, Balance Training, Functional Mobility Training, Transfer Training, Gait Training, Stair training, Equipment Evaluation, Education, & procurement, Patient/Caregiver Education & Training, Safety Education & Training, Neuromuscular Re-education, Home Exercise Program  Safety Devices  Type of devices: Call light within reach, Chair alarm in place, Left in chair, Nurse notified  Restraints  Initially in place: No    AM-PAC Score  AM-PAC Inpatient Mobility Raw Score : 19 (03/02/21 1335)  AM-PAC Inpatient T-Scale Score : 45.44 (03/02/21 1335)  Mobility Inpatient CMS 0-100% Score: 41.77 (03/02/21 1335)  Mobility Inpatient CMS G-Code Modifier : CK (03/02/21 1335)          Goals  Short term goals  Time Frame for Short term goals: to be met by s/c  Short term goal 1: Pt will complete supine->sit transfer independently with HOB flat  Short term goal 2: Pt will complete STS transfer independently  Short term goal 3: Pt will ambulate x 200' indpendently  Short term goal 4: Pt will ascend/descend 10 stairs independently  Patient Goals   Patient goals : to go home       Therapy Time   Individual Concurrent Group Co-treatment   Time In       1121   Time Out       1202   Minutes       41   Timed Code Treatment Minutes: [id number] [address], [facility], [place] [address]

## 2021-03-02 NOTE — PROGRESS NOTES
Speech Language Pathology  CLINICAL BEDSIDE SWALLOWING EVALUATION  Speech Therapy Department    Patient Name:  Kristine Hoff  :  1965  Pain level:Pt denies pain at this time  Medical Diagnosis:   Left leg weakness [R29.898]     HPI:This is a pleasant 50 y.o. male with history carotid artery disease, chronic diastolic CHF, end-stage renal disease on peritoneal dialysis, history of CAD status post CABG, diabetes type 1, seizure disorder, who presents to the emergency room for evaluation of possible CVA. Patient was last known well at 2:30 AM, woke up around 9:00 this morning with left leg numbness and weakness, slurred speech, lethargy. He also reports generalized weakness, diaphoresis blood glucose was noted to be 47 when checked by his girlfriend. He was administered some juice and on presentation to the emergency room, glucose has since improved. However, he reports persistent left leg weakness and numbness. Stroke alert was called, not a candidate for TPA, given onset of symptoms appears to be outside of window. Hospital medicine service consulted for admission for further evaluation. MRI ordered but has not yet been completed  Assessment/plan:  1. Left lower extremity weakness, numbness. Continue antiplatelet therapy (continue aspirin, start Plavix), high-dose statin. Hold antihypertensive medications to allow permissive hypertension. Hypoglycemia could have contributed, although symptoms persist despite correction of hypoglycemia. Monitor on telemetry, plan for MRI-brain, echocardiogram.  2. Acute metabolic encephalopathy, resolved. Likely secondary to hypoglycemia, which has not been corrected. Monitor mental status closely. 3. Hypoglycemia in the setting of history of diabetes type 1. Hold scheduled insulin. Use sliding scale insulin. Start D5 W at 50 cc an hour x5 hours, monitor Accu-Chek hourly x4, then before meals and at bedtime.   4. End-stage renal disease on peritoneal dialysis. Nephrology has been consulted to assist with dialysis needs during hospital stay. 5. Other comorbidities: history carotid artery disease, chronic diastolic CHF, end-stage renal disease on peritoneal dialysis, history of CAD status post CABG, seizure disorder. Speech Therapy/Clinical Swallow Evaluation order received. Pt is currently alert and verbally responsive Pt reports some improvement in his L upper and lower extremity movement but reports continued \"tingling' in his L hand and reports his L leg continues to feel \"heavy\". Assessment of hand strength indicates increased weakness in L vs R hand strength. Pt reports no noted difficulty with speech language function at this time, but does report \"slurred speech\" and \"confusion\" at the time this acute event occurred. Pt does report some difficulty with swallowing function prior to this admission with sensation of solids becoming \"stuck\" in his throat as well as intermittent coughing with liquids. Treatment Diagnosis: Mild/moderate Oropharyngeal Dysphagia    Impressions: Pt demonstrates no overt facial asymmetry at rest. However, Pt demonstrates moderate discoordination with oral and lingual alternating movements. Moderately reduced lingual ROM and mild deviation to L also noted with lingual protrusion and lateralization R/L. With po trials, Pt demonstrates mild/mod reduced bolus control, and A-P propulsion with all textures with intermittent \"effortful\" oropharyngeal bolus transfer noted with solid textures. Clinical symptoms of mild delayed swallow initiation, mild reduced laryngeal excursion and suspected delayed/reduced pharyngeal clearing noted with all textures. No overt signs of aspiration noted with textures given in isolation. However, delayed oral and pharyngeal clearing resulted in one episode of suspected penetration/aspiration with coughing episode with thin liquids given in combination with solids.  Therefore, an MBS is indicated to further assess extent of oropharyngeal dysfunction and to conclusively rule out aspiration. Dietary Recommendations:  1) Dysphagia III Soft and Bite-Sized with thin liquids/no straws/meds with applesauce, pending MBS results    Strategies: 90 degree positioning with all p.o. intake; small bites/sips; alternate textures through meal; reduce rate of intake; No straws    Treatment/Goals: Speech therapy for dysphagia tx 3-5 times per week. ST.) Pt will tolerate recommended diet without s/s of aspiration   2.)Pt will tolerate MBS to r/o aspiration and determine appropriate diet/liquid level with further diet and treatment recommendations as indicated. 3.) Pt will tolerate diet advance to least restricted diet, as clinically indicated, with no signs of aspiration       Oral motor Exam:  Dentition: missing front teeth  Labial/Facial: No overt asymmetry; Mod reduced coordination for alternating oral movements  Lingual: mild deviation to L; Mod reduced strength, ROM and coordination fot alternating movements  Voice: mild weakness    Oral Phase:   Reduced mastication  Reduced A-P propulsion with intermittent \"effortful\" oropharyngeal bolus transfer with solids  Apparent premature bolus loss to pharynx    Pharyngeal Phase:  Mild delayed swallow initiation  Mild decreased laryngeal elevation via palpation   Apparent decreased pharyngeal clearing  Coughing (delayed) with mixed consistency x1    Patient/Family Education:Education, results and recommendations given to the Pt and nurse, who verbalized understanding    Timed Code Treatment: 0 min    Total Treatment Time: 30 min    Discharge Recommendations: Speech Therapy for Speech/Dysphagia treatment at discharge. If patient discharges prior to next session this note will serve as a discharge summary.       Vu Bosch [id number]

## 2021-03-02 NOTE — ED PROVIDER NOTES
As physician-in-triage, I performed a medical screening history and physical exam on Mika Quesada.  I also cared for and evaluated the patient in conjunction with the ED Advanced Practice Provider. All diagnostic, treatment, and disposition decisions were made by myself in conjunction with the advanced practice provider. For all further details of the patient's emergency department visit, please see the advanced practice provider's documentation. Patient presents to the ER for evaluation of marked neuroglycopenia hypoglycemia end-stage renal disease on peritoneal dialysis history of hypertension no prior history of stroke he has a history of anaphylaxis to contrast, the patient presented with marked altered mental status and left leg paresis this is markedly improved since arrival he has normal mental status after glucose administration no weakness of his arms no speech deficit currently he has some mild residual weakness of the left leg with minimal drift, his NIH stroke at most is a 2, this is rapidly improved from onset of his symptoms he is not a candidate for thrombolysis due to improving symptoms. CT head reveals no intracranial hemorrhage. Patient will be admitted for MRI MRA continue neurologic monitoring. Consultation with stroke team concurred that due to the rapidly improving symptoms and hypoglycemia and other confounding variables is not a candidate for TPA.         Impression: Acute hypoglycemia, paresthesia weakness left leg, end-stage renal disease on peritoneal dialysis          Preston Liu MD  64/57/20 1946       Prestno Liu MD  60/39/39 [id number]

## 2021-03-02 NOTE — CONSULTS
Nephrology Consult Note  [phone]   ://[facility].        Reason for Consult:  ESRD on PD    HISTORY OF PRESENT ILLNESS:                The patient is a 50 y.o.male with significant past medical history of ESRD on CCPD, Hypertension, DM I, Diabetic neuropathy,  PVD, Seizures, Hyperlipidemia, CAD/CABG/CHF diastolic, KELLY s/p SUZANNA stent presented with generalized weakness, left leg weakness, slurred speech, hypoglycemia. Reported blood glucose of 47 as checked at home. CT of the head with no acute abnormalities  There are plans for MRI of the brain to r/o CVA    Pt seen and examined  Says he had paresthesias in his left leg when he woke-up and then had paresthesias in left upper extremity, now has significantly improved except some tingling in his left fingers  No history suggestive of weakness.   Started on Cycler overnight  No abdominal pain or nausea/emesis    Past Medical History:        Diagnosis Date    Angina [facility] rest ([facility] [facility]     Cardiomyopathy ([facility] [facility]     Carotid artery stenosis 10/25/2016    SUZANNA stented with 8 x 30 mm non-tapered Xact stent    CHF (congestive heart failure) ([facility] [address] 3/3/15    EF 30%     CKD (chronic kidney disease) stage 2, GFR 60-89 ml/min     Coronary artery disease     sp CABG UC    Diabetic peripheral neuropathy (HCC)     Diastolic HF (heart failure) (HCC)     Hyperlipidemia with target LDL less than 70     Hypertension goal BP (blood pressure) < 130/80     PVD (peripheral vascular disease) (HCC)     Seizures (HCC)     Type 1 diabetes mellitus with chronic kidney disease (HCC)     c-peptide <0.1 in 2015       Past Surgical History:        Procedure Laterality Date    CARDIAC CATHETERIZATION      CARDIAC SURGERY      triple bypass    CORONARY ARTERY BYPASS GRAFT      HERNIA REPAIR      LAPAROSCOPY INSERTION PERITONEAL CATHETER N/A 6/3/2020    LAPAROSCOPIC PLACEMENT OF PERITONEAL DIALYSIS  CATHETER performed by Julian Verduzco MD at [address] [place] [address] Current Medications:    No current facility-administered medications on file prior to encounter. Current Outpatient Medications on File Prior to Encounter   Medication Sig Dispense Refill    Cholecalciferol (VITAMIN D3 ULTRA POTENCY) 1.25 MG (43939 UT) TABS Take 50,000 Units by mouth once a week (on Saturdays)      calcitRIOL (ROCALTROL) 0.25 MCG capsule Take 0.25 mcg by mouth daily      insulin aspart (NOVOLOG FLEXPEN) 100 UNIT/ML injection pen Inject 0-7 Units into the skin 3 times daily (before meals) Per patient sliding scale.  insulin glargine (LANTUS;BASAGLAR) 100 UNIT/ML injection pen Inject 18 Units into the skin nightly      carvedilol (COREG) 25 MG tablet TAKE TWO TABLETS BY MOUTH EVERY MORNING AND TAKE ONE TABLET BY MOUTH EVERY EVENING 90 tablet 5    ranolazine (RANEXA) 500 MG extended release tablet TAKE ONE TABLET BY MOUTH TWICE A DAY 60 tablet 2    hydrALAZINE (APRESOLINE) 100 MG tablet TAKE ONE TABLET BY MOUTH THREE TIMES A DAY 42 tablet 0    isosorbide mononitrate (IMDUR) 30 MG extended release tablet Take 1 tablet by mouth daily 14 tablet 0    amLODIPine (NORVASC) 5 MG tablet Take 5 mg by mouth 2 times daily      furosemide (LASIX) 80 MG tablet Take 80 mg by mouth 2 times daily      Methylcobalamin (B-12) 1000 MCG TBDP Place 1,000 mcg under the tongue daily      aspirin 81 MG tablet Take 1 tablet by mouth daily 90 tablet 3    nitroGLYCERIN (NITROSTAT) 0.4 MG SL tablet DISSOLVE 1 TAB UNDER TONGUE FOR CHEST PAIN - IF PAIN REMAINS AFTER 5 MIN, CALL 911 AND REPEAT DOSE. MAX 3 TABS IN 15 MINUTES 25 tablet 0    blood glucose test strips (ACCU-CHEK GUIDE) strip TEST BLOOD SUGAR 6 TIMES A  strip 5    EPINEPHrine (EPIPEN 2-JESSICA) 0.3 MG/0.3ML SOAJ injection Inject 0.3 mLs into the skin once for 1 dose Use as directed for allergic reaction 0.3 mL 0       Allergies:   Iodides, Shellfish-derived products, and Atorvastatin calcium    Social History:    Social History Socioeconomic History    Marital status: Legally      Spouse name: Not on file    Number of children: 1    Years of education: Not on file    Highest education level: Not on file   Occupational History    Occupation: IT   Social Needs    Financial resource strain: Not on file    Food insecurity     Worry: Not on file     Inability: Not on file   [employer] needs     Medical: Not on file     Non-medical: Not on file   Tobacco Use    Smoking status: Never Smoker    Smokeless tobacco: Never Used   Substance and Sexual Activity    Alcohol use: Yes     Comment: one drink a month    Drug use: No    Sexual activity: Yes     Partners: Female   Lifestyle    Physical activity     Days per week: Not on file     Minutes per session: Not on file    Stress: Not on file   Relationships    Social connections     Talks on phone: Not on file     Gets together: Not on file     Attends Jewish service: Not on file     Active member of club or organization: Not on file     Attends meetings of clubs or organizations: Not on file     Relationship status: Not on file    Intimate partner violence     Fear of current or ex partner: Not on file     Emotionally abused: Not on file     Physically abused: Not on file     Forced sexual activity: Not on file   Other Topics Concern    Not on file   Social History Narrative    Not on file       Family History:       Problem Relation Age of Onset    Coronary Art Dis Father     Diabetes Father     Coronary Art Dis Paternal Grandmother     Diabetes Paternal Grandmother     Hypertension Mother     Other Mother         Epilepsy    Other Sister         Thyroid disease  Heart murmur    Mult Sclerosis Brother     Arthritis Brother     Sleep Apnea Brother        Review of Systems   Constitutional: Negative for activity change, appetite change, chills, fatigue and fever.    HENT: Negative for ear discharge, ear pain, rhinorrhea, sneezing, sore throat and tinnitus. Eyes: Negative for photophobia, pain and redness. Respiratory: Negative for cough, chest tightness, shortness of breath and stridor. Cardiovascular: Negative for chest pain, palpitations and leg swelling. Gastrointestinal: Negative for abdominal pain, constipation, diarrhea, nausea and vomiting. Genitourinary: Negative for dysuria, frequency, hematuria and urgency. Musculoskeletal: Negative for arthralgias, back pain and myalgias. Neurological: Negative for tremors, seizures and headaches. Psychiatric/Behavioral: Negative for confusion and hallucinations. PHYSICAL EXAM:      Vitals:    BP (!) 165/85   Pulse 86   Temp 97.6 °F (36.4 °C) (Axillary)   Resp 16   Wt 190 lb 7.6 oz (86.4 kg)   SpO2 98%   BMI 29.39 kg/m²   I/O last 3 completed shifts:  In: -   Out: 502 [Urine:500]  No intake/output data recorded. Physical Exam:  General : AAOx3, not in pain or respiratory distress, resting in bed  HEENT : normocephalic, atraumatic, mucosa moist, no palor or icterus  CVS: S1 S2 normal, regular rhythm, no murmurs or rubs. Lungs: Clear, no wheezing or crackles. Abd: Soft, bowel sounds normal, non-tender. RLQ PD catheter+  Ext: No edema, no cyanosis  Skin: Warm. No rashes appreciated. : bladder non-distended, no tenderness over the bladder  Neuro: Alert and oriented x 3, nonfocal.  Joints: No erythema noted over joints.       DATA:    CBC with Differential:    Lab Results   Component Value Date    WBC 4.4 03/02/2021    RBC 5.31 03/02/2021    HGB 15.1 03/02/2021    HCT 46.7 03/02/2021     03/02/2021    MCV 87.9 03/02/2021    MCH 28.5 03/02/2021    MCHC 32.4 03/02/2021    RDW 14.8 03/02/2021    SEGSPCT 85.2 06/15/2013    LYMPHOPCT 29.4 03/01/2021    MONOPCT 15.3 03/01/2021    EOSPCT 2.1 09/28/2010    BASOPCT 0.5 03/01/2021    MONOSABS 0.6 03/01/2021    LYMPHSABS 1.2 03/01/2021    EOSABS 0.2 03/01/2021    BASOSABS 0.0 03/01/2021    DIFFTYPE Scan-K 06/15/2013     BMP:    Lab Results   Component Value Date     03/02/2021    K 4.3 03/02/2021    K 4.6 03/01/2021    CL 96 03/02/2021    CO2 27 03/02/2021    BUN 47 03/02/2021    LABALBU 4.1 03/02/2021    CREATININE 6.7 03/02/2021    CALCIUM 8.9 03/02/2021    GFRAA 10 03/02/2021    GFRAA 44 06/15/2013    LABGLOM 9 03/02/2021    GLUCOSE 356 03/02/2021     Ionized Calcium:  No results found for: IONCA  Magnesium:    Lab Results   Component Value Date    MG 2.20 03/02/2021     Phosphorus:    Lab Results   Component Value Date    PHOS 3.5 03/02/2021     Last 3 Troponin:    Lab Results   Component Value Date    TROPONINI 0.06 03/01/2021    TROPONINI 0.04 04/30/2020    TROPONINI 0.04 04/30/2020     U/A:    Lab Results   Component Value Date    COLORU YELLOW 05/01/2020    PROTEINU 100 05/01/2020    PHUR 6.0 05/01/2020    PHUR 6.0 05/01/2020    WBCUA 0 05/01/2020    RBCUA 29 05/01/2020    MUCUS 1+ 05/25/2010    BACTERIA Rare 05/25/2010    CLARITYU Clear 05/01/2020    SPECGRAV 1.012 05/01/2020    LEUKOCYTESUR Negative 05/01/2020    UROBILINOGEN 1.0 05/01/2020    BILIRUBINUR Negative 05/01/2020    BILIRUBINUR NEGATIVE 05/25/2010    BLOODU SMALL 05/01/2020    GLUCOSEU 250 05/01/2020    GLUCOSEU >=1000 05/25/2010       ASSESSMENT/PLAN:      1. ESRD on CCPD  On CCPD with FV of 2200 ml, 3 exchanges. TV of 6600 ml   Draining at this time, PD fluid appears clear  2. R/o acute CVA  MRI brain wo contrast pending  3. Acute Encephalopathy  Secondary to hypoglycemia   Improved  4. Hyponatremia mild  Keep on fluid restriction  5. CKD-MBD  Phos at goal  Resume calcitriol  6. Hypertension controlled  Antihypertensives are currently on hold  Despite which BPs acceptable at this time  Continue to hold in the setting of possible CVA and resume as needed      Thank you for allowing me to participate in the care of this patient. I will continue to follow along. Please call with questions.     Bryce Arce MD.  Office Phone : [phone]  3/2/2021

## 2021-03-02 NOTE — PROGRESS NOTES
Occupational Therapy   Occupational Therapy Initial Assessment  Date: 3/2/2021   Patient Name: Aditya Riley  MRN: [id number]     : 1965    Date of Service: 3/2/2021    Discharge Recommendations: Aditya Riley scored a 19/24 on the AM-PAC ADL Inpatient form. Current research shows that an AM-PAC score of 18 or greater is typically associated with a discharge to the patient's home setting. Based on the patient's AM-PAC score, and their current ADL deficits, it is recommended that the patient have 2-3 sessions per week of Occupational Therapy at d/c to increase the patient's independence. At this time, this patient demonstrates the endurance and safety to discharge home with home OT services and a follow up treatment frequency of 2-3x/wk. Please see assessment section for further patient specific details. HOME HEALTH CARE: LEVEL 1 STANDARD    - Initial home health evaluation to occur within 24-48 hours, in patient home   - Therapy to evaluate with goal of regaining prior level of functioning   - Therapy to evaluate if patient has [address] needs for personal care    If patient discharges prior to next session this note will serve as a discharge summary. Please see below for the latest assessment towards goals. OT Equipment Recommendations  Other: continue to assess; not likely needed    Assessment   Performance deficits / Impairments: Decreased endurance;Decreased balance  Assessment: Pt is limited from baseline in the above areas impacting independence in ADLs and functional mobility. Pt would benefit from skilled inpatient OT services to address these deficits.   Treatment Diagnosis: Decreased functional status secondary to left leg weakness  Prognosis: Good  Decision Making: Low Complexity  OT Education: OT Role;Plan of Care;Transfer Training;Home Exercise Program  Patient Education: d/c, strengthening exercises- pt verbalized understanding  REQUIRES OT FOLLOW UP: Yes  Activity Tolerance  Activity Tolerance: Patient Tolerated treatment well  Safety Devices  Safety Devices in place: Yes  Type of devices: All fall risk precautions in place; Left in chair;Call light within reach;Nurse notified; Chair alarm in place;Gait belt  Restraints  Initially in place: No           Patient Diagnosis(es): The primary encounter diagnosis was Left leg weakness. Diagnoses of Hypoglycemia and End-stage renal disease on peritoneal dialysis [facility]) were also pertinent to this visit. has a past medical history of Angina at rest [facility]), Cardiomyopathy [facility]), Carotid artery stenosis, CHF (congestive heart failure) ([facility] CKD (chronic kidney disease) stage 2, GFR 60-89 ml/min, Coronary artery disease, Diabetic peripheral neuropathy ([facility] Diastolic HF (heart failure) ([facility] Hyperlipidemia with target LDL less than 70, Hypertension goal BP (blood pressure) < 130/80, PVD (peripheral vascular disease) ([facility] Seizures ([facility] and Type 1 diabetes mellitus with chronic kidney disease ([facility] has a past surgical history that includes Cardiac surgery; Coronary artery bypass graft; Cardiac catheterization; hernia repair; Tonsillectomy; and LAPAROSCOPY INSERTION PERITONEAL CATHETER (N/A, 6/3/2020). Treatment Diagnosis: Decreased functional status secondary to left leg weakness      Restrictions  Restrictions/Precautions  Restrictions/Precautions: Fall Risk, Modified Diet(high fall risk, dysphagia soft and bite sized, no straws, 1500 ML fluid restriction)  Required Braces or Orthoses?: No  Position Activity Restriction  Other position/activity restrictions: This is a pleasant 50 y.o. male with history carotid artery disease, chronic diastolic CHF, end-stage renal disease on peritoneal dialysis, history of CAD status post CABG, diabetes type 1, seizure disorder, who presents to the emergency room for evaluation of possible CVA.   Patient was last known well at 2:30 AM, woke up around 9:00 this morning with left leg numbness and weakness, slurred speech, lethargy. He also reports generalized weakness, diaphoresis blood glucose was noted to be 47 when checked by his girlfriend. He was administered some juice and on presentation to the emergency room, glucose has since improved. However, he reports persistent left leg weakness and numbness. Stroke alert was called, not a candidate for TPA, given onset of symptoms appears to be outside of window. Hospital medicine service consulted for admission for further evaluation. MRI ordered but has not yet been completed    Subjective   General  Chart Reviewed: Yes  Patient assessed for rehabilitation services?: Yes  Additional Pertinent Hx: PMH: CHF, HTN, hyperlipidemia, CKD on dialysis, DM. CT head 3/1: no ICH. CXR: no pneumothorax of pleural effusion  Family / Caregiver Present: No  Diagnosis: left leg weakness  Subjective  Subjective: Pt supine in bed upon arrival; pleasant and agreeable to eval.  Patient Currently in Pain: Denies  Vital Signs  Patient Currently in Pain: Denies  Social/Functional History  Social/Functional History  Lives With: Significant other(Pt has his own residence, but has been staying with GF for the last month due to issues with blood sugar; pt plans to returns to GF's residence to recover following hospitalization)  Type of Home: House  Home Layout: Multi-level  Home Access: Stairs to enter with rails  Entrance Stairs - Number of Steps: 6 + 6 CHARO from garage  Entrance Stairs - Rails: Both  Bathroom Shower/Tub: Walk-in shower  Bathroom Toilet: Standard  Bathroom Accessibility: Not accessible  ADL Assistance: Independent  Homemaking Assistance: Independent  Homemaking Responsibilities: Yes  Ambulation Assistance: Independent  Transfer Assistance: Independent  Active : Yes  Occupation: Unemployed  [address]: spend time with [email or web address]  Additional Comments: Pt reports no recent falls.        Objective   Vision: Impaired  Vision Exceptions: Wears glasses at Strength: WFL  L Hand General: 4/5  RUE Strength  Gross RUE Strength: WFL  R Hand General: 4/5                   Plan   Plan  Times per week: 2-3  Current Treatment Recommendations: Home Management Training, Balance Training, Self-Care / ADL, Endurance Training    G-Code     OutComes Score                                                  AM-PAC Score        AM-PAC Inpatient Daily Activity Raw Score: 19 (03/02/21 1250)  AM-PAC Inpatient ADL T-Scale Score : 40.22 (03/02/21 1250)  ADL Inpatient CMS 0-100% Score: 42.8 (03/02/21 1250)  ADL Inpatient CMS G-Code Modifier : CK (03/02/21 1250)    Goals  Short term goals  Time Frame for Short term goals: d/c  Short term goal 1: Pt will complete functional mobility mod I. Short term goal 2: Pt will complete functional transfer mod I. Short term goal 3: Pt will complete functional task in stance for at least 5 minutes, mod I for balance. Short term goal 4: Pt will complete LB dressing mod I.   Patient Goals   Patient goals : functional independence       Therapy Time   Individual Concurrent Group Co-treatment   Time In 8690         Time Out 1202         Minutes 41         Timed Code Treatment Minutes: Amira Robert 126

## 2021-03-02 NOTE — DISCHARGE INSTR - COC
Continuity of Care Form    Patient Name: Toribio Valera   :  1965  MRN:  [id number]    Admit date:  3/1/2021  Discharge date:  ***    Code Status Order: Full Code   Advance Directives:   Advance Care Flowsheet Documentation     Date/Time Healthcare Directive Type of Healthcare Directive Copy in [address] Agent's Name Healthcare Agent's Phone Number    21 [phone]  Other (Comment) pt unsure -- -- -- -- --          Admitting Physician:  Cameron Corbin MD  PCP: No primary care provider on file.     Discharging Nurse: [facility] Unit/Room#: [id number]-[id number]/7621-93  Discharging Unit Phone Number: ***    Emergency Contact:   Extended Emergency Contact Information  Primary Emergency Contact: [address] [place] [address] Phone: [phone]  Relation: Child  Secondary Emergency Contact: [address] Phone: [phone]  Mobile Phone: [phone]  Relation: Brother/Sister    Past Surgical History:  Past Surgical History:   Procedure Laterality Date    CARDIAC CATHETERIZATION      CARDIAC SURGERY      triple bypass    CORONARY ARTERY BYPASS GRAFT      HERNIA REPAIR      LAPAROSCOPY INSERTION PERITONEAL CATHETER N/A 6/3/2020    LAPAROSCOPIC PLACEMENT OF PERITONEAL DIALYSIS  CATHETER performed by Janeen Nuno MD at [address] [place] [address]         Immunization History:   Immunization History   Administered Date(s) Administered    DT (pediatric) 1999    Influenza Vaccine, unspecified formulation 2012    Influenza Virus Vaccine 2009, 2012    Influenza, Quadv, IM, PF (6 mo and older Fluzone, Flulaval, Fluarix, and 3 yrs and older Afluria) 10/26/2016, 2018    Pneumococcal Polysaccharide ([id number]) 2011, 2014    Td, unspecified formulation 2011    Tdap (Boostrix, Adacel) 2014       Active Problems:  Patient Active Problem List   Diagnosis Code    DKA (diabetic ketoacidoses) ([facility] E11.10    Coronary artery disease involving native heart with angina pectoris ([facility]) I25.119    DM type 1 (diabetes mellitus, type 1) ([facility]) E10.9    Acute kidney injury superimposed on chronic kidney disease ([facility]) N17.9, N18.9    PNA (pneumonia) J18.9    CRI (chronic renal insufficiency) ([facility]) N18.9    CHF (congestive heart failure) ([facility]) I50.9    Hypoglycemia R03.9    Diastolic HF (heart failure) ([facility]) I50.30    Dyslipidemia E78.5    CKD (chronic kidney disease) stage 2, GFR 60-89 ml/min N18.2    Type 1 diabetes mellitus with chronic kidney disease ([facility]) E10.22    Diabetic peripheral neuropathy ([facility]) E11.42    Carotid artery stenosis I65.29    PVD (peripheral vascular disease) ([facility]) I73.9    Diabetic nephropathy associated with type 1 diabetes mellitus ([facility]) E10.21    Chest pain R07.9    Stenosis of right carotid artery I65.21    Carotid artery calcification I65.29    H/O carotid angioplasty Z98.62    Pure hypercholesterolemia E78.00    Abnormal cardiovascular stress test R94.39    Acute encephalopathy G93.40    Generalized idiopathic epilepsy, not intractable, without status epilepticus ([facility] G40.309    Encephalopathy G93.40    NSTEMI (non-ST elevated myocardial infarction) ([facility]) I21.4    HTN (hypertension), benign I10    S/P CABG (coronary artery bypass graft) Z95.1    Ischemic cardiomyopathy I25.5    Venous insufficiency of both lower extremities I87.2    Atrial fibrillation with rapid ventricular response ([facility]) I48.91    Syncope R55    Stage 4 chronic kidney disease ([facility]) N18.4    NSVT (nonsustained ventricular tachycardia) ([facility]) I47.2    DKA, type 1, not at goal [facility]) E10.10    Acute on chronic combined systolic and diastolic CHF (congestive heart failure) ([facility]) I50.43    Left leg weakness R29.898    Arterial ischemic stroke, ICA, right, acute ([facility]) I63.231    DM (diabetes mellitus), type 2, uncontrolled with complications ([facility]) N72.1, E11.65    End-stage renal disease on peritoneal dialysis ([facility] N18.6, Z99.2       Isolation/Infection:   Isolation          No Isolation        Patient Infection Status     Infection Onset Added Last Indicated Last Indicated By Review Planned Expiration Resolved Resolved By    None active    Resolved    COVID-19 Rule Out 05/29/20 05/29/20 05/29/20 COVID-19 Ambulatory (Ordered)   05/31/20 Rule-Out Test Resulted          Nurse Assessment:  Last Vital Signs: BP (!) 111/16   Pulse 91   Temp 98.5 °F (36.9 °C) (Oral)   Resp 16   Ht 5' 7.5\" (1.715 m)   Wt 189 lb 9.5 oz (86 kg)   SpO2 100%   BMI 29.26 kg/m²     Last documented pain score (0-10 scale):    Last Weight:   Wt Readings from Last 1 Encounters:   03/02/21 189 lb 9.5 oz (86 kg)     Mental Status:  {IP PT MENTAL STATUS:20030}    IV Access:  { CLOVER IV ACCESS:060254640}    Nursing Mobility/ADLs:  Walking   {P DME QYTC:443141905}  Transfer  {P DME BLUE:578912306}  Bathing  {P DME WVTC:523647502}  Dressing  {P DME JEHX:821485550}  Toileting  {P DME JMNP:964505376}  Feeding  {[facility] DME RDMK:144826229}  Med Admin  {[facility] DME HEFX:179807259}  Med Delivery   { CLOVER MED Delivery:887946627}    Wound Care Documentation and Therapy:        Elimination:  Continence:   · Bowel: {YES / HP:66559}  · Bladder: {YES / HS:88641}  Urinary Catheter: {Urinary Catheter:527467137}   Colostomy/Ileostomy/Ileal Conduit: {YES / QE:18521}       Date of Last BM: ***    Intake/Output Summary (Last 24 hours) at 3/2/2021 1637  Last data filed at 3/2/2021 1145  Gross per 24 hour   Intake --   Output 1432 ml   Net -1432 ml     I/O last 3 completed shifts:  In: -   Out: 9847 [Urine:725]    Safety Concerns:     [address] Safety Concerns:355014100}    Impairments/Disabilities:      [address] Impairments/Disabilities:359991028}    Nutrition Therapy:  Current Nutrition Therapy:   [address] Diet List:555774836}    Routes of Feeding: {CHP DME Other Feedings:051752414}  Liquids: {Slp liquid thickness:23639}  Daily Fluid Restriction: {CHP DME Yes amt example:891290009}  Last Modified Barium Swallow with Video (Video Swallowing Test): {Done Not Done QPUV:757775635}    Treatments at the Time of Hospital Discharge:   Respiratory Treatments: ***  Oxygen Therapy:  {Therapy; copd oxygen:44269}  Ventilator:    {MH CC Vent JUYB:863635478}    Rehab Therapies: {THERAPEUTIC INTERVENTION:6633628822}  Weight Bearing Status/Restrictions: 50M[address] Rivas CC Weight Bearin}  Other Medical Equipment (for information only, NOT a DME order):  {EQUIPMENT:426723319}  Other Treatments: ***    Patient's personal belongings (please select all that are sent with patient):  {P DME Belongings:541834224}    RN SIGNATURE:  {Esignature:859173175}    CASE MANAGEMENT/SOCIAL WORK SECTION    Inpatient Status Date: 2021    Readmission Risk Assessment Score:  Readmission Risk              Risk of Unplanned Readmission:        16           Discharging to Facility/ Agency     Name:   [facility]  Address:  [address] [place], [place] [address] Antoinette Aase Rawlins, [address] [place] [address]  Phone:  [phone]    Your first appointments are at 12pm and 12:45pm with Occupational and Physical Therapy respectively. Please arrive at 11:45am to complete your initial paperwork. / signature: Electronically signed by ROBIN Camarillo LSW on 3/2/21 at 4:39 PM EST    PHYSICIAN SECTION    Prognosis: {Prognosis:9972441190}    Condition at Discharge: [address]ivas Patient Condition:795534977}    Rehab Potential (if transferring to Rehab): {Prognosis:1902221345}    Recommended Labs or Other Treatments After Discharge: ***    Physician Certification: I certify the above information and transfer of Wendy Villarreal  is necessary for the continuing treatment of the diagnosis listed and that he requires {Admit to Appropriate Level of Care:20256} for {GREATER/LESS:000708670} 30 days.      Update Admission H&P: {CHP DME Changes in VKIOJ:662638634}    PHYSICIAN SIGNATURE: {[facility]:682943119}

## 2021-03-03 LAB
ESTIMATED AVERAGE GLUCOSE: 185.8 MG/DL
GLUCOSE BLD-MCNC: 191 MG/DL (ref 70–99)
GLUCOSE BLD-MCNC: 208 MG/DL (ref 70–99)
GLUCOSE BLD-MCNC: 244 MG/DL (ref 70–99)
GLUCOSE BLD-MCNC: 298 MG/DL (ref 70–99)
GLUCOSE BLD-MCNC: 315 MG/DL (ref 70–99)
GLUCOSE BLD-MCNC: 318 MG/DL (ref 70–99)
HBA1C MFR BLD: 8.1 %
HEPATITIS B SURFACE ANTIGEN INTERPRETATION: NORMAL
PERFORMED ON: ABNORMAL

## 2021-03-03 PROCEDURE — APPSS60 APP SPLIT SHARED TIME 46-60 MINUTES: Performed by: NURSE PRACTITIONER

## 2021-03-03 PROCEDURE — 96372 THER/PROPH/DIAG INJ SC/IM: CPT

## 2021-03-03 PROCEDURE — 2060000000 HC ICU INTERMEDIATE R&B

## 2021-03-03 PROCEDURE — APPNB30 APP NON BILLABLE TIME 0-30 MINS: Performed by: NURSE PRACTITIONER

## 2021-03-03 PROCEDURE — 99233 SBSQ HOSP IP/OBS HIGH 50: CPT | Performed by: PSYCHIATRY & NEUROLOGY

## 2021-03-03 PROCEDURE — 90945 DIALYSIS ONE EVALUATION: CPT

## 2021-03-03 PROCEDURE — 99253 IP/OBS CNSLTJ NEW/EST LOW 45: CPT | Performed by: SURGERY

## 2021-03-03 PROCEDURE — 6370000000 HC RX 637 (ALT 250 FOR IP): Performed by: NURSE PRACTITIONER

## 2021-03-03 PROCEDURE — 2580000003 HC RX 258: Performed by: INTERNAL MEDICINE

## 2021-03-03 PROCEDURE — 6370000000 HC RX 637 (ALT 250 FOR IP): Performed by: INTERNAL MEDICINE

## 2021-03-03 PROCEDURE — 6360000002 HC RX W HCPCS: Performed by: INTERNAL MEDICINE

## 2021-03-03 PROCEDURE — 92526 ORAL FUNCTION THERAPY: CPT

## 2021-03-03 RX ORDER — ASPIRIN 81 MG/1
81 TABLET ORAL DAILY
Qty: 30 TABLET | Refills: 3 | Status: SHIPPED | OUTPATIENT
Start: 2021-03-04

## 2021-03-03 RX ORDER — CLOPIDOGREL BISULFATE 75 MG/1
75 TABLET ORAL DAILY
Qty: 30 TABLET | Refills: 3 | Status: SHIPPED | OUTPATIENT
Start: 2021-03-04 | End: 2021-09-20 | Stop reason: SDUPTHER

## 2021-03-03 RX ORDER — FUROSEMIDE 40 MG/1
80 TABLET ORAL DAILY
Status: DISCONTINUED | OUTPATIENT
Start: 2021-03-03 | End: 2021-03-05 | Stop reason: HOSPADM

## 2021-03-03 RX ORDER — ROSUVASTATIN CALCIUM 40 MG/1
40 TABLET, COATED ORAL NIGHTLY
Qty: 30 TABLET | Refills: 3 | Status: SHIPPED | OUTPATIENT
Start: 2021-03-03

## 2021-03-03 RX ORDER — DIPHENHYDRAMINE HCL 25 MG
50 TABLET ORAL ONCE
Status: COMPLETED | OUTPATIENT
Start: 2021-03-04 | End: 2021-03-04

## 2021-03-03 RX ORDER — ACETAMINOPHEN 325 MG/1
650 TABLET ORAL EVERY 4 HOURS PRN
Status: DISCONTINUED | OUTPATIENT
Start: 2021-03-03 | End: 2021-03-05 | Stop reason: HOSPADM

## 2021-03-03 RX ORDER — ISOSORBIDE MONONITRATE 30 MG/1
30 TABLET, EXTENDED RELEASE ORAL DAILY
Status: DISCONTINUED | OUTPATIENT
Start: 2021-03-03 | End: 2021-03-05 | Stop reason: HOSPADM

## 2021-03-03 RX ORDER — CARVEDILOL 25 MG/1
25 TABLET ORAL 2 TIMES DAILY WITH MEALS
Status: DISCONTINUED | OUTPATIENT
Start: 2021-03-03 | End: 2021-03-05 | Stop reason: HOSPADM

## 2021-03-03 RX ORDER — NITROGLYCERIN 0.4 MG/1
0.4 TABLET SUBLINGUAL EVERY 5 MIN PRN
Status: DISCONTINUED | OUTPATIENT
Start: 2021-03-03 | End: 2021-03-05 | Stop reason: HOSPADM

## 2021-03-03 RX ADMIN — HEPARIN SODIUM 5000 UNITS: 5000 INJECTION INTRAVENOUS; SUBCUTANEOUS at 21:09

## 2021-03-03 RX ADMIN — RANOLAZINE 500 MG: 500 TABLET, FILM COATED, EXTENDED RELEASE ORAL at 21:03

## 2021-03-03 RX ADMIN — INSULIN GLARGINE 15 UNITS: 100 INJECTION, SOLUTION SUBCUTANEOUS at 21:08

## 2021-03-03 RX ADMIN — INSULIN LISPRO 6 UNITS: 100 INJECTION, SOLUTION INTRAVENOUS; SUBCUTANEOUS at 09:17

## 2021-03-03 RX ADMIN — HEPARIN SODIUM 5000 UNITS: 5000 INJECTION INTRAVENOUS; SUBCUTANEOUS at 14:52

## 2021-03-03 RX ADMIN — INSULIN LISPRO 2 UNITS: 100 INJECTION, SOLUTION INTRAVENOUS; SUBCUTANEOUS at 21:04

## 2021-03-03 RX ADMIN — ROSUVASTATIN CALCIUM 40 MG: 40 TABLET, FILM COATED ORAL at 21:03

## 2021-03-03 RX ADMIN — ISOSORBIDE MONONITRATE 30 MG: 30 TABLET, EXTENDED RELEASE ORAL at 11:59

## 2021-03-03 RX ADMIN — NITROGLYCERIN 0.4 MG: 0.4 TABLET SUBLINGUAL at 04:22

## 2021-03-03 RX ADMIN — ASPIRIN 81 MG: 81 TABLET ORAL at 09:17

## 2021-03-03 RX ADMIN — NITROGLYCERIN 0.4 MG: 0.4 TABLET SUBLINGUAL at 02:39

## 2021-03-03 RX ADMIN — CARVEDILOL 25 MG: 25 TABLET, FILM COATED ORAL at 16:30

## 2021-03-03 RX ADMIN — CALCITRIOL 0.25 MCG: 0.25 CAPSULE ORAL at 09:17

## 2021-03-03 RX ADMIN — PREDNISONE 50 MG: 20 TABLET ORAL at 18:39

## 2021-03-03 RX ADMIN — INSULIN LISPRO 4 UNITS: 100 INJECTION, SOLUTION INTRAVENOUS; SUBCUTANEOUS at 17:10

## 2021-03-03 RX ADMIN — Medication 10 ML: at 09:22

## 2021-03-03 RX ADMIN — CLOPIDOGREL BISULFATE 75 MG: 75 TABLET ORAL at 09:17

## 2021-03-03 RX ADMIN — RANOLAZINE 500 MG: 500 TABLET, FILM COATED, EXTENDED RELEASE ORAL at 09:17

## 2021-03-03 RX ADMIN — FUROSEMIDE 80 MG: 40 TABLET ORAL at 11:59

## 2021-03-03 RX ADMIN — HEPARIN SODIUM 5000 UNITS: 5000 INJECTION INTRAVENOUS; SUBCUTANEOUS at 05:27

## 2021-03-03 RX ADMIN — CARVEDILOL 25 MG: 25 TABLET, FILM COATED ORAL at 11:59

## 2021-03-03 RX ADMIN — Medication 10 ML: at 21:04

## 2021-03-03 RX ADMIN — INSULIN LISPRO 2 UNITS: 100 INJECTION, SOLUTION INTRAVENOUS; SUBCUTANEOUS at 11:59

## 2021-03-03 ASSESSMENT — PAIN SCALES - GENERAL
PAINLEVEL_OUTOF10: 3
PAINLEVEL_OUTOF10: 0
PAINLEVEL_OUTOF10: 0
PAINLEVEL_OUTOF10: 10
PAINLEVEL_OUTOF10: 0
PAINLEVEL_OUTOF10: 7

## 2021-03-03 ASSESSMENT — PAIN DESCRIPTION - PAIN TYPE
TYPE: ACUTE PAIN

## 2021-03-03 ASSESSMENT — PAIN DESCRIPTION - LOCATION
LOCATION: CHEST

## 2021-03-03 NOTE — FLOWSHEET NOTE
CCPD treatment completed and patient disconnected from cycler per protocol. Effluent:clear yellow Fibrin (no) Specimen collected and sent to lab (no)  Total time: 10hrs 3min  Total UF:  966 ml. Total Volume:  6609 ml. Dwell time gained: 17min. Tolerance of procedure : Tolerated treatment well. No PD related complaints or complications.    Report given to: Catrachita Jaime RN       03/03/21 0759   Vitals   BP (!) 156/84   Temp 98.6 °F (37 °C)   Pulse 90   Resp 18   Weight 190 lb 3.2 oz (86.3 kg)

## 2021-03-03 NOTE — PLAN OF CARE
Problem: Falls - Risk of:  Goal: Will remain free from falls  Description: Will remain free from falls  Outcome: Ongoing  Goal: Absence of physical injury  Description: Absence of physical injury  Outcome: Ongoing     Problem: Musculor/Skeletal Functional Status  Goal: Highest potential functional level  Outcome: Ongoing  Goal: Absence of falls  Outcome: Ongoing     VSS, ECHO/Carotid dopplers/MRI done, minor complaint of chest pain but no signs of cardiac compromise. Bgs remained elevated.

## 2021-03-03 NOTE — PROGRESS NOTES
Nephrology Progress Note  [phone]   [email or web address]    Patient:  Marino Adorno   : 1965    Brief HPI    The patient is a 50 y.o.male with significant past medical history of ESRD on CCPD, Hypertension, DM I, Diabetic neuropathy,  PVD, Seizures, Hyperlipidemia, CAD/CABG/CHF diastolic, KELLY s/p SUZANNA stent presented with generalized weakness, left leg weakness, slurred speech, hypoglycemia. Reported blood glucose of 47 as checked at home. CT of the head with no acute abnormalities  There are plans for MRI of the brain to r/o CVA    Subjective/Interval history    Pt seen and examined  Had CCPD overnight without issues  BPs uncontrolled  On room air  Has been afebrile  Denies abdominal pain or nausea/emesis    Review of Systems   No chest pain or sob    SHx:  No visitors at the bed-side    Meds:  Scheduled Meds:   rosuvastatin  40 mg Oral Nightly    insulin glargine  15 Units Subcutaneous Nightly    insulin lispro  0-12 Units Subcutaneous TID WC    insulin lispro  0-6 Units Subcutaneous Nightly    calcitRIOL  0.25 mcg Oral Daily    sodium chloride flush  10 mL Intravenous 2 times per day    aspirin  81 mg Oral Daily    Or    aspirin  300 mg Rectal Daily    heparin (porcine)  5,000 Units Subcutaneous 3 times per day    ranolazine  500 mg Oral BID    clopidogrel  75 mg Oral Daily     Continuous Infusions:   dextrose       PRN Meds:.acetaminophen, nitroGLYCERIN, glucose, dextrose, glucagon (rDNA), dextrose, sodium chloride flush, promethazine **OR** ondansetron, polyethylene glycol, labetalol      Vitals:  BP (!) 153/78   Pulse 88   Temp 98 °F (36.7 °C) (Oral)   Resp 18   Ht 5' 7.5\" (1.715 m)   Wt 190 lb 3.2 oz (86.3 kg)   SpO2 98%   BMI 29.35 kg/m²     Physical Exam  General : AAOx3, not in pain or respiratory distress, resting in bed  HEENT : mucosa moist.  CVS: S1 S2 normal, regular rhythm, no murmurs or rubs. Lungs: Clear, no wheezing or crackles.   Abd: Soft, bowel sounds normal, non-tender. RLQ PD catheter+  Ext: No edema, no cyanosis  Skin: Warm. No rashes appreciated. : bladder non-distended, no tenderness over the bladder      Labs:  CBC with Differential:    Lab Results   Component Value Date    WBC 4.4 03/02/2021    RBC 5.31 03/02/2021    HGB 15.1 03/02/2021    HCT 46.7 03/02/2021     03/02/2021    MCV 87.9 03/02/2021    MCH 28.5 03/02/2021    MCHC 32.4 03/02/2021    RDW 14.8 03/02/2021    SEGSPCT 85.2 06/15/2013    LYMPHOPCT 29.4 03/01/2021    MONOPCT 15.3 03/01/2021    EOSPCT 2.1 09/28/2010    BASOPCT 0.5 03/01/2021    MONOSABS 0.6 03/01/2021    LYMPHSABS 1.2 03/01/2021    EOSABS 0.2 03/01/2021    BASOSABS 0.0 03/01/2021    DIFFTYPE Scan-K 06/15/2013     BMP:    Lab Results   Component Value Date     03/02/2021    K 4.3 03/02/2021    K 4.6 03/01/2021    CL 96 03/02/2021    CO2 27 03/02/2021    BUN 47 03/02/2021    LABALBU 4.1 03/02/2021    CREATININE 6.7 03/02/2021    CALCIUM 8.9 03/02/2021    GFRAA 10 03/02/2021    GFRAA 44 06/15/2013    LABGLOM 9 03/02/2021    GLUCOSE 356 03/02/2021     Ionized Calcium:  No results found for: IONCA  Magnesium:    Lab Results   Component Value Date    MG 2.20 03/02/2021     Phosphorus:    Lab Results   Component Value Date    PHOS 3.5 03/02/2021     U/A:    Lab Results   Component Value Date    COLORU YELLOW 05/01/2020    PROTEINU 100 05/01/2020    PHUR 6.0 05/01/2020    PHUR 6.0 05/01/2020    WBCUA 0 05/01/2020    RBCUA 29 05/01/2020    MUCUS 1+ 05/25/2010    BACTERIA Rare 05/25/2010    CLARITYU Clear 05/01/2020    SPECGRAV 1.012 05/01/2020    LEUKOCYTESUR Negative 05/01/2020    UROBILINOGEN 1.0 05/01/2020    BILIRUBINUR Negative 05/01/2020    BILIRUBINUR NEGATIVE 05/25/2010    BLOODU SMALL 05/01/2020    GLUCOSEU 250 05/01/2020    GLUCOSEU >=1000 05/25/2010       Assessment/Plan:    1. ESRD on CCPD  On CCPD with FV of 2200 ml, 3 exchanges. TV of 6600 ml   Noted notes from dialysis RN  Continue current regimen.      2. Left hemiparesis and paresthesias  ? TIA  CT/MRI with no evidence of acute CVA  Neuro on board    3. Acute Encephalopathy improved  Secondary to hypoglycemia     4. Hyponatremia mild  continue fluid restriction    5. CKD-MBD  Continue calcitriol    6. Hypertension controlled  Antihypertensives are currently on hold  CVA ruled out  Would resume home meds gradually  Resume Carvedilol and Imdur for now    Plan  Resume antihypertensives  Resume Lasix  Ok to administer IV contrast for CT neck if needed  Discussed with Dr. Zachary Rodrigues MD.  3/3/2021  Office Phone : [phone]    Thank you for allowing us to participate in the care of this pt. I willcontinue to follow along. Please call with questions or concerns.

## 2021-03-03 NOTE — PROGRESS NOTES
Clinical Pharmacy Note:  CHF CORE MEASURES     Pamela Lambert is a 50 y.o. male with congestive heart failure. Echocardiogram on 3/2/21 estimated ejection fraction of 30-35%. The patient is receiving the appropriate medications to meet the CHF core measure.     Beta-Blocker: carvedilol    Angiotensin-Converting Enzyme Inhibitor/Angiotensin II Receptor Blocker/ARNI: contraindicated due to end stage renal disease    Also receiving isosorbide mononitrate and hydralazine    Eunice Diallo PharmD

## 2021-03-03 NOTE — PROGRESS NOTES
Speech Language Pathology  Dysphagia Treatment Note    Name:  Jalil Mcclure  :   1965  Medical Diagnosis:  Left leg weakness [R29.898]  Treatment Diagnosis: Oropharyngeal Dysphagia  Pain level: Pt denies pain    Current Diet Level: Dysphagia III Soft and Bite-Sized with thin liquids/no straws/meds with applesauce, pending MBS results  Tolerance of Current Diet Level: No reported concerns from pt or RN re: swallowing    Assessment of Texture Tolerance:  -Impressions: RN cleared SLP entry, pt alert and receptive to intervention. Alert and oriented x 4. Pt followed commands and answered questions accurately, participated in conversation appropriately. Resting in bed with HOB elevated, RR around 16/min on RA. Pt assessed with PO intake, independently fed: thin liquids via cup edge and straw sips, puree, soft and bite-sized, and solids. Adequate labial seal with no anterior spillage. Mildly prolonged oral manipulation with solids but adequate A-P transit, no significant oral residue post swallow. Laryngeal elevation palpated during swallow, judged to be mildly reduced. Suspect delayed swallow initiation. Pt with slight throat clear following solids x 1 and thin liquid straw sip x 1. No other overt clinical s/s penetration or aspiration across presentations. Pt continues to endorse globus sensation in lower throat and h/o reflux, reports possible plan for upper GI per PCP. Skilled instruction re: evidence based methods of decreasing adverse outcomes of associated with aspiration,  explanation of reflux and aspiration precautions, and sequencing of compensatory strategeies developed based on clinical assessment. Pt able to recall and demonstrate understanding of recommendations with min cues. Recommend continuation of soft and bite-sized diet/thin liquids (cup edge sips). Pt may benefit from instrumental swallow study for further assessment of oropharyngeal swallow function. Will continue to follow. -Compensatory strategies: 90 degree positioning with all p.o. intake; small bites/sips; alternate textures through meal; reduce rate of intake; No straws    Diet and Treatment Recommendations:  Dysphagia III Soft and Bite-Sized with thin liquids/no straws/meds with applesauce, pending MBS results    ST.) Pt will tolerate recommended diet without s/s of aspiration. (3/3 ongoing)  2.)Pt will tolerate MBS to r/o aspiration and determine appropriate diet/liquid level with further diet and treatment recommendations as indicated. (3/3 ongoing)  3.) Pt will tolerate diet advance to least restricted diet, as clinically indicated, with no signs of aspiration. (3/3 ongoing)    Plan: Continued daily Dysphagia treatment with goals per plan of care. Patient/Family Education: Education given to the Pt and nurse, who verbalized understanding    Discharge Recommendations:  Pt will benefit from continued skilled Speech Therapy for Dysphagia services, prior to returning home. Timed Code Treatment: 0    Total Treatment Time: 15    If patient discharges prior to next session this note will serve as a discharge summary.        Signature:  Ajith Luciano, [id number] [facility]  Speech-Language Pathologist  [facility]. [id number]

## 2021-03-03 NOTE — DISCHARGE SUMMARY
Hospital Medicine Discharge Summary    Patient: Edwin Ochoa     Gender: male  : 1965   Age: 50 y.o. MRN: [id number]    Admitting Physician: Angelina Gilliam MD  Discharge Physician: Antoinette Wills MD     Code Status: Full Code     Admit Date: 3/1/2021   Discharge Date:   3/5/21    Disposition:  Home  Time spent arranging discharge: 35 minutes    Discharge Diagnoses:    TIA  Chest pain    Condition at Discharge:  [address] Course:   Was admitted to hospital with resolving left leg weakness and left arm numbness. Patient had a carotid ultrasound performed which showed 50 to 70% stenosis of right internal carotid artery and less than 50% of the left. Seen by vascular recommended repeat US carotid in 6 months Patient had MRI of the brain performed which showed no acute infarct. Patient symptoms resolved and felt much better and patient was discharged home with outpatient PT. Neurology was seen and place patient on aspirin and Plavix for secondary prevention and statin. Patient had chest pain, seend by cardiology, fellt troponin elevation secondary to ESRD, cleared for discharge per cardiology neurology and vascular   Discharge Exam:    /68   Pulse 87   Temp 98.1 °F (36.7 °C) (Oral)   Resp 18   Ht 5' 7.5\" (1.715 m)   Wt 187 lb 6.3 oz (85 kg)   SpO2 95%   BMI 28.92 kg/m²   General appearance:  Appears comfortable. AAOx3  HEENT: atraumatic, Pupils equal, muscous membranes moist, no masses appreciated  Cardiovascular: Regular rate and rhythm no murmurs appreciated  Respiratory: CTAB no wheezing  Gastrointestinal: Abdomen soft, non-tender, BS+  EXT: no edema  Neurology: no gross focal deficts, no dysmetria  Psychiatry: Appropriate affect.  Not agitated  Skin: Warm, dry, no rashes appreciated    Discharge Medications:   Current Discharge Medication List      START taking these medications    Details   aspirin 81 MG EC tablet Take 1 tablet by mouth daily  Qty: 30 tablet, Refills: 3      clopidogrel (PLAVIX) 75 MG tablet Take 1 tablet by mouth daily  Qty: 30 tablet, Refills: 3           Current Discharge Medication List      CONTINUE these medications which have CHANGED    Details   rosuvastatin (CRESTOR) 40 MG tablet Take 1 tablet by mouth nightly  Qty: 30 tablet, Refills: 3           Current Discharge Medication List      CONTINUE these medications which have NOT CHANGED    Details   Cholecalciferol (VITAMIN D3 ULTRA POTENCY) 1.25 MG (79926 UT) TABS Take 50,000 Units by mouth once a week (on Saturdays)      calcitRIOL (ROCALTROL) 0.25 MCG capsule Take 0.25 mcg by mouth daily      insulin aspart (NOVOLOG FLEXPEN) 100 UNIT/ML injection pen Inject 0-7 Units into the skin 3 times daily (before meals) Per patient sliding scale. insulin glargine (LANTUS;BASAGLAR) 100 UNIT/ML injection pen Inject 18 Units into the skin nightly      carvedilol (COREG) 25 MG tablet TAKE TWO TABLETS BY MOUTH EVERY MORNING AND TAKE ONE TABLET BY MOUTH EVERY EVENING  Qty: 90 tablet, Refills: 5      ranolazine (RANEXA) 500 MG extended release tablet TAKE ONE TABLET BY MOUTH TWICE A DAY  Qty: 60 tablet, Refills: 2    Associated Diagnoses: Coronary artery disease due to lipid rich plaque      hydrALAZINE (APRESOLINE) 100 MG tablet TAKE ONE TABLET BY MOUTH THREE TIMES A DAY  Qty: 42 tablet, Refills: 0    Comments: Patient is awaiting new insurance to kick in and needs temporary short term refills. Quoted price by tech was $12.98. Thank you.      isosorbide mononitrate (IMDUR) 30 MG extended release tablet Take 1 tablet by mouth daily  Qty: 14 tablet, Refills: 0    Comments: Patient is awaiting new insurance to kick in and needs temporary short term refills. Quoted price by tech was $11.98. Thank you.       amLODIPine (NORVASC) 5 MG tablet Take 5 mg by mouth 2 times daily      furosemide (LASIX) 80 MG tablet Take 80 mg by mouth 2 times daily      Methylcobalamin (B-12) 1000 MCG TBDP Place 1,000 mcg under the tongue daily      aspirin 81 MG tablet Take 1 tablet by mouth daily  Qty: 90 tablet, Refills: 3      nitroGLYCERIN (NITROSTAT) 0.4 MG SL tablet DISSOLVE 1 TAB UNDER TONGUE FOR CHEST PAIN - IF PAIN REMAINS AFTER 5 MIN, CALL 911 AND REPEAT DOSE. MAX 3 TABS IN 15 MINUTES  Qty: 25 tablet, Refills: 0      blood glucose test strips (ACCU-CHEK GUIDE) strip TEST BLOOD SUGAR 6 TIMES A DAY  Qty: 200 strip, Refills: 5      EPINEPHrine (EPIPEN 2-JESSICA) 0.3 MG/0.3ML SOAJ injection Inject 0.3 mLs into the skin once for 1 dose Use as directed for allergic reaction  Qty: 0.3 mL, Refills: 0           Current Discharge Medication List          Labs:  For convenience and continuity at follow-up the following most recent labs are provided:    Lab Results   Component Value Date    WBC 4.4 03/02/2021    HGB 15.1 03/02/2021    HCT 46.7 03/02/2021    MCV 87.9 03/02/2021     03/02/2021     03/02/2021    K 4.3 03/02/2021    K 4.6 03/01/2021    CL 96 03/02/2021    CO2 27 03/02/2021    BUN 47 03/02/2021    CREATININE 6.7 03/02/2021    CALCIUM 8.9 03/02/2021    PHOS 3.5 03/02/2021    ALKPHOS 129 03/02/2021    ALT 9 03/02/2021    AST 10 03/02/2021    BILITOT 0.3 03/02/2021    BILIDIR <0.2 05/06/2018    LABALBU 4.1 03/02/2021    LDLCALC 153 03/02/2021    TRIG 85 03/02/2021     Lab Results   Component Value Date    INR 1.04 03/01/2021    INR 1.03 07/06/2017    INR 0.98 10/26/2016       Radiology:  Echo Complete 2d W Doppler W Color    Result Date: 3/2/2021  Transthoracic Echocardiography Report (TTE)  Demographics   Patient Name       Teresa Cleveland   Date of Study      03/02/2021         Gender              Male   Patient Number     [id number]         Date of Birth       1965   Visit Number       [id number]          Age                 50 year(s)   Accession Number   [id number]         Room Number         9471   Corporate ID       [id number]            Sonographer         Emili Sebastian RDCS, RVT, BS   Ordering Physician Cordella Hodgkin MD,                     MD                 Physician           [facility], [address]  Procedure Type of Study   TTE procedure:ECHOCARDIOGRAM COMPLETE 2D W DOPPLER W COLOR. Procedure Date Date: 03/02/2021 Start: 12:19 PM Study Location: [facility] - Echo Lab Technical Quality: Adequate visualization Indications:CVA. Additional Indications:Bubbles and Definity performed. . Patient Status: Routine Contrast Medium: Definity. Amount - 2 ml Height: 67 inches Weight: 190 pounds BSA: 1.98 m2 BMI: 29.76 kg/m2 HR: 86 bpm BP: 142/85 mmHg  Conclusions   Summary  Definity contrast agent was used to help visualize endocardial borders. Left ventricular cavity size is moderately dilated. Overall left ventricular systolic function appears severely reduced. Ejection fraction is visually estimated to be 30-35%. There is moderate hypokinesis of the basal-mid septal, basal-mid lateral,  basal-mid inferior and basal-mid inferolateral walls. Grade I diastolic dysfunction with normal LV filling pressures. Mild mitral regurgitation. A bubble study was performed and fails to show evidence of shunting. Compared to previous echo, LVEF now reduced   Signature   ------------------------------------------------------------------  Electronically signed by Carolina Naranjo MD, [facility], [address]  (Interpreting physician) on 03/02/2021 at 02:13 PM  ------------------------------------------------------------------   Findings   Left Ventricle  Left ventricular cavity size is moderately dilated. Normal left ventricular wall thickness. Overall left ventricular systolic function appears severely reduced. Ejection fraction is visually estimated to be 30-35%. There is moderate hypokinesis of the basal-mid septal, basal-mid lateral,  basal-mid inferior and basal-mid inferolateral walls. Average E/e': 11.8.   Grade I diastolic dysfunction with normal LV Velocity: 101 cm/s  PV Peak Gradient: 4.08 mmHg   Aortic Valve   Peak Velocity: 147 cm/s     Mean Velocity: 111 cm/s  Peak Gradient: 8.64 mmHg    Mean Gradient: 6 mmHg  Area (continuity): 1.85 cm2  AV VTI: 36.5 cm  Aorta   Aortic Root: 3.1 cm  Ascending Aorta: 2.9 cm  LVOT Diameter: 2 cm      Ct Head Wo Contrast    Result Date: 3/1/2021  EXAMINATION: CT OF THE HEAD WITHOUT CONTRAST  3/1/2021 3:48 pm TECHNIQUE: CT of the head was performed without the administration of intravenous contrast. Dose modulation, iterative reconstruction, and/or weight based adjustment of the mA/kV was utilized to reduce the radiation dose to as low as reasonably achievable. COMPARISON: None HISTORY: ORDERING SYSTEM PROVIDED HISTORY: Left leg weakness TECHNOLOGIST PROVIDED HISTORY: Has a \"code stroke\" or \"stroke alert\" been called? ->Yes Reason for exam:->Left leg weakness Decision Support Exception->Emergency Medical Condition (MA) Reason for Exam: stroke alert Acuity: Acute Type of Exam: Initial FINDINGS: BRAIN/VENTRICLES: There is no acute intracranial hemorrhage, mass effect or midline shift. No abnormal extra-axial fluid collection. The gray-white differentiation is maintained without evidence of an acute infarct. There is no evidence of hydrocephalus. ORBITS: The visualized portion of the orbits demonstrate no acute abnormality. SINUSES: Complete opacification of right frontal sinus, high attenuation. Mucoperiosteal thickening, bubbly mucus-like material, and small air-fluid level to the left sphenoid sinus. Mucoperiosteal thickening to partially imaged bilateral maxillary sinuses, right more than left, with likely some superimposed mucous retention cysts versus sinus polyps. Mastoid air cells and middle ears appear clear. SOFT TISSUES/SKULL:  No acute abnormality of the visualized skull or soft tissues. No acute intracranial abnormality. Paranasal sinus disease as above.   This includes complete opacification of right frontal sinus to include high attenuation material which likely reflects inspissated mucus material.  Fungal infection felt much less likely but suggest clinical correlation. Findings were discussed with Jenny Cano at 4:00 pm on 3/1/2021. Xr Chest Portable    Result Date: 3/1/2021  EXAMINATION: ONE XRAY VIEW OF THE CHEST 3/1/2021 3:42 pm COMPARISON: May 22, 2020 HISTORY: ORDERING SYSTEM PROVIDED HISTORY: Left leg weakness TECHNOLOGIST PROVIDED HISTORY: Reason for exam:->Left leg weakness Reason for Exam: Extremity Weakness Acuity: Acute Type of Exam: Initial FINDINGS: The cardiomediastinal silhouette is normal in size. Stable post sternotomy changes are present. The lungs are clear. No pleural effusion or pneumothorax is identified. No acute cardiopulmonary process. Vl Dup Carotid Bilateral    Result Date: 3/2/2021  Vascular Carotid Procedure -- PRELIMINARY SONOGRAPHER REPORT --   Demographics   Patient Name      Rowan Abad   Date of Study     03/02/2021         Gender              Male   Patient Number    [id number]         Date of Birth       1965   Visit Number      [id number]          Age                 50 year(s)   Accession Number  [id number]         Room Number         6160   Corporate ID      [id number]            Christina Liao MD  Physician         MD                 Physician  Procedure Type of Study:   Cerebral:Carotid, VL CAROTID DUPLEX BILATERAL. Tech Comments Right The right internal carotid artery demonstrates a patent stent, appears to have a upper limits 50-69% diameter reducing stenosis based on velocity criteria. The right vertebral artery demonstrates normal antegrade flow. The right subclavian artery is visualized and demonstrates multiphasic flow.  Left The left internal carotid artery appears to have a <50% diameter reducing stenosis based on velocity criteria. The left vertebral artery demonstrates normal antegrade flow. The left subclavian artery is visualized and demonstrates multiphasic flow. Velocities are measured in cm/s ; Diameters are measured in mm Carotid Right Measurements +---------------+----+----+-----+----+ ! Location       ! PSV ! EDV ! Angle! RI  ! +---------------+----+----+-----+----+ ! Prox CCA       !99. 5!82. 3!60   !0.76! +---------------+----+----+-----+----+ ! Mid CCA        !64.5!18. 2! 60   !0.72! +---------------+----+----+-----+----+ ! Dist CCA       !57. 8!18  !60   !0.69! +---------------+----+----+-----+----+ ! Prox ICA       !60.9!24. 1! 60   !0.6 ! +---------------+----+----+-----+----+ ! Mid ICA        !207 !68. 6! 60   !0.67! +---------------+----+----+-----+----+ ! Dist ICA       ! 154 !39. 1! 60   !0.71! +---------------+----+----+-----+----+ ! Prox ECA       !069 !    !61   !    ! +---------------+----+----+-----+----+ ! Vertebral      !55.9!    !60   !    ! +---------------+----+----+-----+----+ ! Prox Subclavian! 153 !    !60   !    ! +---------------+----+----+-----+----+   - There is antegrade vertebral flow noted on the right side. - Additional Measurements:ICAPSV/CCAPSV 3.21. ICAEDV/CCAEDV 2.82. Carotid Left Measurements +---------------+----+----+-----+----+ ! Location       ! PSV ! EDV ! Angle! RI  ! +---------------+----+----+-----+----+ ! Prox CCA       !102 !24. 2! 60   !0.76! +---------------+----+----+-----+----+ ! Mid CCA        !96. 7!14. 1! 60   !0.73! +---------------+----+----+-----+----+ ! Dist CCA       ! 75  !23. 8!60   !0.68! +---------------+----+----+-----+----+ ! Prox ICA       !79.8!26. 8!46   !0.66! +---------------+----+----+-----+----+ ! Mid ICA        !65. 9!25. 2! 60   !0.62! +---------------+----+----+-----+----+ ! Dist ICA       !65. 3!26. 3! 60   !0.6 ! +---------------+----+----+-----+----+ ! Prox ECA       !160 !    !61   !    ! +---------------+----+----+-----+----+ ! Vertebral      !31  ! !61   !    ! +---------------+----+----+-----+----+ ! Prox Subclavian! 132 !    !60   !    ! +---------------+----+----+-----+----+   - There is antegrade vertebral flow noted on the left side. - Additional Measurements:ICAPSV/CCAPSV 0.82. ICAEDV/CCAEDV 1. 11. Mri Brain Without Contrast    Result Date: 3/2/2021  EXAMINATION: MRI OF THE BRAIN WITHOUT CONTRAST  3/2/2021 2:30 pm TECHNIQUE: Multiplanar multisequence MRI of the brain was performed without the administration of intravenous contrast. COMPARISON: 07/11/2015 HISTORY: ORDERING SYSTEM PROVIDED HISTORY: possible cva TECHNOLOGIST PROVIDED HISTORY: Reason for exam:->possible cva Decision Support Exception->Emergency Medical Condition (MA) Reason for Exam: R/O possible cva. Left sided extremity pain Acuity: Acute Type of Exam: Initial Initial evaluation FINDINGS: INTRACRANIAL STRUCTURES/VENTRICLES: There is no acute infarct. The ventricles and cisternal spaces are normal in size, shape, and configuration for the age of the patient. There are a few punctate areas of high signal in the periventricular white matter and centrum semiovale that are likely related to chronic small vessel ischemic disease. There is no midline shift or mass effect. There are no areas of restricted diffusion. ORBITS: The visualized portion of the orbits demonstrate no acute abnormality. SINUSES: There is mucoperiosteal thickening of the maxillary sinus and sphenoid sinus. There is near complete opacification of the right frontal sinus. The remainder of the sinuses are clear. The mastoid air cells are clear. BONES/SOFT TISSUES: The bone marrow signal intensity appears normal. The soft tissues demonstrate no acute abnormality. Mild chronic small vessel ischemic changes. No acute brain parenchymal abnormality. Mucoperiosteal thickening of the sinuses as discussed above.          Signed:    Shobha Tian MD   3/3/2021

## 2021-03-03 NOTE — PROGRESS NOTES
Colleen Jaeger  Neurology Follow-up  [facility]    Date of Service: 3/3/2021    Subjective:   CC: Follow up today regarding: New onset left-sided paresthesia and weakness. Possible stroke versus TIA. Events noted. Chart and lab reviewed. The patient denies any new symptoms today. Still with residual numbness on the left side mainly left foot and hand. No weakness. MRI showed no acute stroke. Carotid Doppler showed right ICA stenosis up to 50-69%. Echo showed an EF of 30%. He denies any headache, dysphagia or dysarthria. Other review of system was unremarkable. ROS : A 10-12 system review obtained and updated today and is unremarkable except as mentioned  in my interval history. family history includes Arthritis in his brother; Coronary Art Dis in his father and paternal grandmother; Diabetes in his father and paternal grandmother; Hypertension in his mother; Mult Sclerosis in his brother; Other in his mother and sister; Sleep Apnea in his brother.     Past Medical History:   Diagnosis Date    [facility]     Cardiomyopathy ([facility]     Carotid artery stenosis 10/25/2016    SUZANNA stented with 8 x 30 mm non-tapered Xact stent    CHF (congestive heart failure) ([facility] [address] 3/3/15    EF 30%     CKD (chronic kidney disease) stage 2, GFR 60-89 ml/min     Coronary artery disease     sp CABG UC    Diabetic peripheral neuropathy (HCC)     Diastolic HF (heart failure) (HCC)     Hyperlipidemia with target LDL less than 70     Hypertension goal BP (blood pressure) < 130/80     PVD (peripheral vascular disease) (HCC)     Seizures (HCC)     Type 1 diabetes mellitus with chronic kidney disease (HCC)     c-peptide <0.1 in 2015     Current Facility-Administered Medications   Medication Dose Route Frequency Provider Last Rate Last Admin    acetaminophen (TYLENOL) tablet 650 mg  650 mg Oral Q4H JAYLEN Goodwin CNP        nitroGLYCERIN (NITROSTAT) SL tablet 0.4 mg  0.4 mg Sublingual Q5 Min PRN JAYLEN Ureña - CNP   0.4 mg at 03/03/21 0422    carvedilol (COREG) tablet 25 mg  25 mg Oral BID  Sarah Henderson MD   25 mg at 03/03/21 1159    isosorbide mononitrate (IMDUR) extended release tablet 30 mg  30 mg Oral Daily Sarah Henderson MD   30 mg at 03/03/21 1159    furosemide (LASIX) tablet 80 mg  80 mg Oral Daily Sarah Henderson MD   80 mg at 03/03/21 1159    rosuvastatin (CRESTOR) tablet 40 mg  40 mg Oral Nightly Mya Regalado MD   40 mg at 03/02/21 2210    insulin glargine (LANTUS;BASAGLAR) injection pen 15 Units  15 Units Subcutaneous Nightly Cally Medina MD   15 Units at 03/02/21 2231    insulin lispro (1 Unit Dial) 0-12 Units  0-12 Units Subcutaneous TID  Cally Medina MD   2 Units at 03/03/21 1159    insulin lispro (1 Unit Dial) 0-6 Units  0-6 Units Subcutaneous Nightly Cally Medina MD   1 Units at 03/02/21 2232    calcitRIOL (ROCALTROL) capsule 0.25 mcg  0.25 mcg Oral Daily Sarah Henderson MD   0.25 mcg at 03/03/21 0917    glucose (GLUTOSE) 40 % oral gel 15 g  15 g Oral PRN Mya Regalado MD        dextrose 50 % IV solution  12.5 g Intravenous PRN Mya Regalado MD        glucagon (rDNA) injection 1 mg  1 mg Intramuscular PRN Mya Regalado MD        dextrose 5 % solution  100 mL/hr Intravenous PRN Mya Regalado MD        sodium chloride flush 0.9 % injection 10 mL  10 mL Intravenous 2 times per day Mya Regalado MD   10 mL at 03/03/21 0355    sodium chloride flush 0.9 % injection 10 mL  10 mL Intravenous PRN Mya Regalado MD        promethazine (PHENERGAN) tablet 12.5 mg  12.5 mg Oral Q6H PRN Mya Regalado MD        Or    ondansetron (ZOFRAN) injection 4 mg  4 mg Intravenous Q6H PRN Mya Regalado MD        polyethylene glycol (GLYCOLAX) packet 17 g  17 g Oral Daily PRN Mya Regalado MD   17 g at 03/02/21 0381    aspirin EC tablet 81 mg  81 mg Oral Daily Mya Regalado MD   81 mg at 03/03/21 3367    Or    aspirin suppository 300 mg  300 mg Rectal Daily Samantha Grijalva MD        labetalol (NORMODYNE;TRANDATE) injection 10 mg  10 mg Intravenous Q10 Min PRN Samantha Grijalva MD        heparin (porcine) injection 5,000 Units  5,000 Units Subcutaneous 3 times per day Samantha Grijalva MD   5,000 Units at 03/03/21 7013    ranolazine (RANEXA) extended release tablet 500 mg  500 mg Oral BID Samantha Grijalva MD   500 mg at 03/03/21 0058    clopidogrel (PLAVIX) tablet 75 mg  75 mg Oral Daily Samantha Grijalva MD   75 mg at 03/03/21 9888     Allergies   Allergen Reactions    Iodides Anaphylaxis    Shellfish-Derived Products Anaphylaxis    Atorvastatin Calcium Rash      reports that he has never smoked. He has never used smokeless tobacco. He reports current alcohol use. He reports that he does not use drugs. Objective:  Exam:   Constitutional:   Vitals:    03/03/21 0421 03/03/21 0546 03/03/21 0759 03/03/21 0915   BP: (!) 183/94 (!) 164/96 (!) 156/84 (!) 153/78   Pulse: 92 90 90 88   Resp: 20  18 18   Temp: 98.6 °F (37 °C)  98.6 °F (37 °C) 98 °F (36.7 °C)   TempSrc: Oral   Oral   SpO2: 98%   98%   Weight:   190 lb 3.2 oz (86.3 kg)    Height:         General appearance:  Normal development and appear in no acute distress. Mental Status:   Oriented to person, place, problem, and time. Memory: Good immediate recall. Intact remote memory  Normal attention span and concentration. Language: intact naming, repeating and fluency   Good fund of Knowledge. Cranial Nerves:   II: Visual fields: Full. Pupils: equal, round, reactive to light  III,IV,VI: Extra Ocular Movements are intact. No nystagmus  V: Facial sensation is intact  VII: Facial strength and movements: intact and symmetric  IX: Palate elevation is symmetric  XI: Shoulder shrug is intact  XII: Tongue movements are normal  Musculoskeletal: 5/5 in all 4 extremities. Tone: Normal tone.    Reflexes: Symmetric 2+ in both arms and legs.  Coordination: no pronator drift, no dysmetria with FNF  Sensation: normal to all modalities in both arms and legs. Gait/Posture: steady gait        Data:  LABS:   Lab Results   Component Value Date     03/02/2021    K 4.3 03/02/2021    K 4.6 03/01/2021    CL 96 03/02/2021    CO2 27 03/02/2021    BUN 47 03/02/2021    CREATININE 6.7 03/02/2021    GFRAA 10 03/02/2021    GFRAA 44 06/15/2013    LABGLOM 9 03/02/2021    GLUCOSE 356 03/02/2021    PHOS 3.5 03/02/2021    MG 2.20 03/02/2021    CALCIUM 8.9 03/02/2021     Lab Results   Component Value Date    WBC 4.4 03/02/2021    RBC 5.31 03/02/2021    HGB 15.1 03/02/2021    HCT 46.7 03/02/2021    MCV 87.9 03/02/2021    RDW 14.8 03/02/2021     03/02/2021     Lab Results   Component Value Date    INR 1.04 03/01/2021    PROTIME 12.1 03/01/2021       Neuroimaging was independently reviewed by me and discussed results with the patient  I reviewed blood testing and other test results and discussed results with the patient      Impression:  New onset left-sided weakness and paresthesia. Could be TIA. Now with right ICA stenosis up to 70% by carotid Doppler criteria. Symptomatic. Diabetes, not controlled  Hypertension, not controlled  End-stage renal disease  Hyperlipidemia  Cardiomyopathy. Recommendation  CTA head and neck  Follow-up with nephrology regarding his end-stage renal disease  Depending on the results of CTA, he may need vascular consultation  Continue DAPT  Statin  Blood sugar monitor  Insulin sliding scale and blood sugar control  PT and OT  Telemetry  Continue current blood pressure medications  Discussed  risk of strokes as well as risk of ICA stenosis with the patient today  Discussed with primary team  Will follow  MDM: High complexity due to recent TIA, carotid stenosis and high risk of strokes and recurrence. Aviva Keen MD   [phone]      This dictation was generated by voice recognition computer software.  Although all attempts are made to edit the dictation for accuracy, there may be errors in the transcription that are not intended.

## 2021-03-03 NOTE — CONSULTS
[facility]  Consultation Note    Chief Complaint / Reason for Consultation  Carotid stenosis     History of Present Illness  Patient is a 50 y.o. male with past medical history of CAD s/p CABG, CHF, DM type 1, HTN, HLD, carotid stenosis s/p right carotid stent 2016 and ESRD on PD who presented to the ED with complaints of left sided weakness, numbness and tingling. He reports he woke up Monday morning and felt like his left arm ad leg were numb and weak. His leg felt heavy and was having trouble moving it. He reports these symptoms have improved but he still has some numbness to his left hand and foot. He denies any vision changes, facial droop or slurred speech. He denies any history of stroke in the past.  He does have a history of right ICA stent placed in October 2016 with Dr. Luan Brittle. He reports he has been taking an Aspirin daily. He has been off Plavix and statin therapy for sometime. He denies tobacco use. Further workup included MRI brain which showed no acute stroke and carotid duplex that showed a patent right ICA stent with upper limits 50-69% stenosis and less than 50% stenosis of the left ICA. We have been consulted for further evaluation and recommendations. Review of Systems  + left sided numbness and weakness. Denies fevers, chills, chest pain, shortness of breath, nausea, vomiting, hematemesis, diarrhea, constipation, melena, hematochezia, wt changes, vision problems, blindness, hearing problems, facial droop, slurred speech, dysuria.     Past Medical History:   Diagnosis Date    [facility])     [facility]     Carotid artery stenosis 10/25/2016    SUZANNA stented with 8 x 30 mm non-tapered Xact stent    CHF (congestive heart failure) ([facility] [address] 3/3/15    EF 30%     CKD (chronic kidney disease) stage 2, GFR 60-89 ml/min     Coronary artery disease     sp CABG UC    Diabetic peripheral neuropathy (HCC)     Diastolic HF (heart failure) ([facility] [address]     Hyperlipidemia with target LDL less than 79     Hypertension goal BP (blood pressure) < 130/80     PVD (peripheral vascular disease) (HCC)     Seizures (HCC)     Type 1 diabetes mellitus with chronic kidney disease (HCC)     c-peptide <0.1 in 2015       Past Surgical History:   Procedure Laterality Date    CARDIAC CATHETERIZATION      CARDIAC SURGERY      triple bypass    CORONARY ARTERY BYPASS GRAFT      HERNIA REPAIR      LAPAROSCOPY INSERTION PERITONEAL CATHETER N/A 6/3/2020    LAPAROSCOPIC PLACEMENT OF PERITONEAL DIALYSIS  CATHETER performed by Apurva Solano MD at [facility]         Allergies   Allergen Reactions    Iodides Anaphylaxis    Shellfish-Derived Products Anaphylaxis    Atorvastatin Calcium Rash       Social History     Socioeconomic History    Marital status: Legally      Spouse name: Not on file    Number of children: 1    Years of education: Not on file    Highest education level: Not on file   Occupational History    Occupation: IT   Social Needs    Financial resource strain: Not on file    Food insecurity     Worry: Not on file     Inability: Not on file   EXTRABANCA needs     Medical: Not on file     Non-medical: Not on file   Tobacco Use    Smoking status: Never Smoker    Smokeless tobacco: Never Used   Substance and Sexual Activity    Alcohol use: Yes     Comment: one drink a month    Drug use: No    Sexual activity: Yes     Partners: Female   Lifestyle    Physical activity     Days per week: Not on file     Minutes per session: Not on file    Stress: Not on file   Relationships    Social connections     Talks on phone: Not on file     Gets together: Not on file     Attends [employer]oration[employer] service: Not on file     Active member of club or organization: Not on file     Attends meetings of clubs or organizations: Not on file     Relationship status: Not on file    Intimate partner violence     Fear of current or ex partner: Not on 03/02/2021    K 4.3 03/02/2021    K 4.6 03/01/2021    CL 96 03/02/2021    CO2 27 03/02/2021    PHOS 3.5 03/02/2021    BUN 47 03/02/2021    CREATININE 6.7 03/02/2021      No components found for: GLU    Scheduled Meds:    carvedilol  25 mg Oral BID     isosorbide mononitrate  30 mg Oral Daily    furosemide  80 mg Oral Daily    rosuvastatin  40 mg Oral Nightly    insulin glargine  15 Units Subcutaneous Nightly    insulin lispro  0-12 Units Subcutaneous TID WC    insulin lispro  0-6 Units Subcutaneous Nightly    calcitRIOL  0.25 mcg Oral Daily    sodium chloride flush  10 mL Intravenous 2 times per day    aspirin  81 mg Oral Daily    Or    aspirin  300 mg Rectal Daily    heparin (porcine)  5,000 Units Subcutaneous 3 times per day    ranolazine  500 mg Oral BID    clopidogrel  75 mg Oral Daily     Continuous Infusions:    dextrose         Imaging:     Carotid duplex 3/2/21:     Tech Comments   Right   The right internal carotid artery demonstrates a patent stent, appears to have   a upper limits 50-69% diameter reducing stenosis based on velocity criteria. The right vertebral artery demonstrates normal antegrade flow. The right subclavian artery is visualized and demonstrates multiphasic flow. Left   The left internal carotid artery appears to have a <50% diameter reducing   stenosis based on velocity criteria. The left vertebral artery demonstrates normal antegrade flow. The left subclavian artery is visualized and demonstrates multiphasic flow. Right ICAPSV/CCAPSV ratio 3.21    MRI brain 3/2/21:  Impression   Mild chronic small vessel ischemic changes.  No acute brain parenchymal   abnormality.       Mucoperiosteal thickening of the sinuses as discussed above.         Assessment:   Right ICA stenosis with h/o right ICA stent 2016 - duplex showing upper limits right ICA 50-69% stenosis, less than 50% stenosis of left ICA  Left sided weakness and paresthesia - ?  TIA, neurology following neck.  Plan for repeat carotid duplex scan in 6 months and follow-up in my office. If concerns for progression of disease would recommend formal angiogram and possible repeat percutaneous carotid intervention  Sincerely appreciate the consultation      Amandeep Hooper M.D., FACS.  3/4/2021  4:15 PM

## 2021-03-03 NOTE — FLOWSHEET NOTE
03/02/21 1920   Vitals   BP (!) 147/80   Temp 98.2 °F (36.8 °C)   Pulse 91   Resp 20   Weight 190 lb 0.6 oz (86.2 kg)  (Standing scale. States EDW 88.6 kg)     CCPD order: Total Volume: 6600 ml  Therapy Time Duration: 09:00  Exchange Volume: 2200 ml  Number of Exchanges: 3  Final Fill: No    Connected to Cycler per protocol. Exit site care done, site without redness/drainage, DSD/occlusive to site. Initial drain/ ml. Effluent clear without fibrin. States last BM 2-. Tolerated procedure without difficulty.     Report given to Deo Tompkins RN, instructed to given laxative tonight.

## 2021-03-03 NOTE — CARE COORDINATION
Discharge Note:    OP orders for therapy on chart. First appointment made for 3/8/21. No further discharge planning needs.     Wilmar Maldonado RN BSN  Case Management  [phone]

## 2021-03-04 DIAGNOSIS — I65.23 BILATERAL CAROTID ARTERY STENOSIS: Primary | ICD-10-CM

## 2021-03-04 LAB
ANION GAP SERPL CALCULATED.3IONS-SCNC: 12 MMOL/L (ref 3–16)
BASOPHILS ABSOLUTE: 0 K/UL (ref 0–0.2)
BASOPHILS RELATIVE PERCENT: 0.1 %
BUN BLDV-MCNC: 57 MG/DL (ref 7–20)
CALCIUM SERPL-MCNC: 9.5 MG/DL (ref 8.3–10.6)
CHLORIDE BLD-SCNC: 94 MMOL/L (ref 99–110)
CO2: 24 MMOL/L (ref 21–32)
CREAT SERPL-MCNC: 6.9 MG/DL (ref 0.9–1.3)
EKG ATRIAL RATE: 106 BPM
EKG DIAGNOSIS: NORMAL
EKG P AXIS: 63 DEGREES
EKG P-R INTERVAL: 146 MS
EKG Q-T INTERVAL: 360 MS
EKG QRS DURATION: 102 MS
EKG QTC CALCULATION (BAZETT): 478 MS
EKG R AXIS: 69 DEGREES
EKG T AXIS: 17 DEGREES
EKG VENTRICULAR RATE: 106 BPM
EOSINOPHILS ABSOLUTE: 0 K/UL (ref 0–0.6)
EOSINOPHILS RELATIVE PERCENT: 0 %
GFR AFRICAN AMERICAN: 10
GFR NON-AFRICAN AMERICAN: 8
GLUCOSE BLD-MCNC: 242 MG/DL (ref 70–99)
GLUCOSE BLD-MCNC: 252 MG/DL (ref 70–99)
GLUCOSE BLD-MCNC: 327 MG/DL (ref 70–99)
GLUCOSE BLD-MCNC: 353 MG/DL (ref 70–99)
GLUCOSE BLD-MCNC: 359 MG/DL (ref 70–99)
GLUCOSE BLD-MCNC: 399 MG/DL (ref 70–99)
GLUCOSE BLD-MCNC: 405 MG/DL (ref 70–99)
GLUCOSE BLD-MCNC: 418 MG/DL (ref 70–99)
GLUCOSE BLD-MCNC: 434 MG/DL (ref 70–99)
GLUCOSE BLD-MCNC: 439 MG/DL (ref 70–99)
HCT VFR BLD CALC: 46.6 % (ref 40.5–52.5)
HEMOGLOBIN: 14.9 G/DL (ref 13.5–17.5)
LYMPHOCYTES ABSOLUTE: 0.7 K/UL (ref 1–5.1)
LYMPHOCYTES RELATIVE PERCENT: 8.5 %
MCH RBC QN AUTO: 27.9 PG (ref 26–34)
MCHC RBC AUTO-ENTMCNC: 31.9 G/DL (ref 31–36)
MCV RBC AUTO: 87.5 FL (ref 80–100)
MONOCYTES ABSOLUTE: 0.1 K/UL (ref 0–1.3)
MONOCYTES RELATIVE PERCENT: 1.7 %
NEUTROPHILS ABSOLUTE: 7.3 K/UL (ref 1.7–7.7)
NEUTROPHILS RELATIVE PERCENT: 89.7 %
PDW BLD-RTO: 14.9 % (ref 12.4–15.4)
PERFORMED ON: ABNORMAL
PLATELET # BLD: 161 K/UL (ref 135–450)
PMV BLD AUTO: 10.4 FL (ref 5–10.5)
POTASSIUM REFLEX MAGNESIUM: 4.9 MMOL/L (ref 3.5–5.1)
RBC # BLD: 5.32 M/UL (ref 4.2–5.9)
SODIUM BLD-SCNC: 130 MMOL/L (ref 136–145)
TROPONIN: 0.04 NG/ML
WBC # BLD: 8.2 K/UL (ref 4–11)

## 2021-03-04 PROCEDURE — 2580000003 HC RX 258: Performed by: INTERNAL MEDICINE

## 2021-03-04 PROCEDURE — 6370000000 HC RX 637 (ALT 250 FOR IP): Performed by: INTERNAL MEDICINE

## 2021-03-04 PROCEDURE — 36415 COLL VENOUS BLD VENIPUNCTURE: CPT

## 2021-03-04 PROCEDURE — 93005 ELECTROCARDIOGRAM TRACING: CPT | Performed by: INTERNAL MEDICINE

## 2021-03-04 PROCEDURE — 97530 THERAPEUTIC ACTIVITIES: CPT

## 2021-03-04 PROCEDURE — 99254 IP/OBS CNSLTJ NEW/EST MOD 60: CPT | Performed by: INTERNAL MEDICINE

## 2021-03-04 PROCEDURE — APPSS30 APP SPLIT SHARED TIME 16-30 MINUTES: Performed by: NURSE PRACTITIONER

## 2021-03-04 PROCEDURE — 6360000002 HC RX W HCPCS: Performed by: INTERNAL MEDICINE

## 2021-03-04 PROCEDURE — 93010 ELECTROCARDIOGRAM REPORT: CPT | Performed by: INTERNAL MEDICINE

## 2021-03-04 PROCEDURE — 84484 ASSAY OF TROPONIN QUANT: CPT

## 2021-03-04 PROCEDURE — 6370000000 HC RX 637 (ALT 250 FOR IP): Performed by: PSYCHIATRY & NEUROLOGY

## 2021-03-04 PROCEDURE — 99232 SBSQ HOSP IP/OBS MODERATE 35: CPT | Performed by: PSYCHIATRY & NEUROLOGY

## 2021-03-04 PROCEDURE — 80048 BASIC METABOLIC PNL TOTAL CA: CPT

## 2021-03-04 PROCEDURE — 90945 DIALYSIS ONE EVALUATION: CPT

## 2021-03-04 PROCEDURE — 6370000000 HC RX 637 (ALT 250 FOR IP): Performed by: NURSE PRACTITIONER

## 2021-03-04 PROCEDURE — 97116 GAIT TRAINING THERAPY: CPT

## 2021-03-04 PROCEDURE — APPNB30 APP NON BILLABLE TIME 0-30 MINS: Performed by: NURSE PRACTITIONER

## 2021-03-04 PROCEDURE — 94760 N-INVAS EAR/PLS OXIMETRY 1: CPT

## 2021-03-04 PROCEDURE — 96372 THER/PROPH/DIAG INJ SC/IM: CPT

## 2021-03-04 PROCEDURE — 2060000000 HC ICU INTERMEDIATE R&B

## 2021-03-04 PROCEDURE — 85025 COMPLETE CBC W/AUTO DIFF WBC: CPT

## 2021-03-04 RX ORDER — HYDRALAZINE HYDROCHLORIDE 100 MG/1
100 TABLET, FILM COATED ORAL EVERY 8 HOURS SCHEDULED
Status: DISCONTINUED | OUTPATIENT
Start: 2021-03-04 | End: 2021-03-05 | Stop reason: HOSPADM

## 2021-03-04 RX ORDER — INSULIN LISPRO 100 [IU]/ML
0-18 INJECTION, SOLUTION INTRAVENOUS; SUBCUTANEOUS
Status: DISCONTINUED | OUTPATIENT
Start: 2021-03-04 | End: 2021-03-05 | Stop reason: HOSPADM

## 2021-03-04 RX ORDER — INSULIN LISPRO 100 [IU]/ML
4 INJECTION, SOLUTION INTRAVENOUS; SUBCUTANEOUS
Status: DISCONTINUED | OUTPATIENT
Start: 2021-03-04 | End: 2021-03-05 | Stop reason: HOSPADM

## 2021-03-04 RX ORDER — AMLODIPINE BESYLATE 5 MG/1
5 TABLET ORAL DAILY
Status: DISCONTINUED | OUTPATIENT
Start: 2021-03-04 | End: 2021-03-05 | Stop reason: HOSPADM

## 2021-03-04 RX ORDER — INSULIN LISPRO 100 [IU]/ML
0-9 INJECTION, SOLUTION INTRAVENOUS; SUBCUTANEOUS NIGHTLY
Status: DISCONTINUED | OUTPATIENT
Start: 2021-03-04 | End: 2021-03-05 | Stop reason: HOSPADM

## 2021-03-04 RX ADMIN — INSULIN LISPRO 15 UNITS: 100 INJECTION, SOLUTION INTRAVENOUS; SUBCUTANEOUS at 13:05

## 2021-03-04 RX ADMIN — NITROGLYCERIN 0.4 MG: 0.4 TABLET SUBLINGUAL at 09:37

## 2021-03-04 RX ADMIN — INSULIN LISPRO 12 UNITS: 100 INJECTION, SOLUTION INTRAVENOUS; SUBCUTANEOUS at 06:35

## 2021-03-04 RX ADMIN — ASPIRIN 81 MG: 81 TABLET ORAL at 09:20

## 2021-03-04 RX ADMIN — HEPARIN SODIUM 5000 UNITS: 5000 INJECTION INTRAVENOUS; SUBCUTANEOUS at 21:48

## 2021-03-04 RX ADMIN — CARVEDILOL 25 MG: 25 TABLET, FILM COATED ORAL at 09:21

## 2021-03-04 RX ADMIN — Medication 10 ML: at 09:20

## 2021-03-04 RX ADMIN — ISOSORBIDE MONONITRATE 30 MG: 30 TABLET, EXTENDED RELEASE ORAL at 09:21

## 2021-03-04 RX ADMIN — PREDNISONE 50 MG: 20 TABLET ORAL at 00:51

## 2021-03-04 RX ADMIN — INSULIN LISPRO 12 UNITS: 100 INJECTION, SOLUTION INTRAVENOUS; SUBCUTANEOUS at 17:01

## 2021-03-04 RX ADMIN — HYDRALAZINE HYDROCHLORIDE 100 MG: 100 TABLET, FILM COATED ORAL at 21:48

## 2021-03-04 RX ADMIN — Medication 10 ML: at 21:49

## 2021-03-04 RX ADMIN — HYDRALAZINE HYDROCHLORIDE 100 MG: 100 TABLET, FILM COATED ORAL at 14:23

## 2021-03-04 RX ADMIN — NITROGLYCERIN 0.4 MG: 0.4 TABLET SUBLINGUAL at 00:52

## 2021-03-04 RX ADMIN — CARVEDILOL 25 MG: 25 TABLET, FILM COATED ORAL at 17:01

## 2021-03-04 RX ADMIN — NITROGLYCERIN 0.4 MG: 0.4 TABLET SUBLINGUAL at 04:09

## 2021-03-04 RX ADMIN — INSULIN GLARGINE 5 UNITS: 100 INJECTION, SOLUTION SUBCUTANEOUS at 21:48

## 2021-03-04 RX ADMIN — ROSUVASTATIN CALCIUM 40 MG: 40 TABLET, FILM COATED ORAL at 21:48

## 2021-03-04 RX ADMIN — INSULIN LISPRO 4 UNITS: 100 INJECTION, SOLUTION INTRAVENOUS; SUBCUTANEOUS at 13:05

## 2021-03-04 RX ADMIN — CALCITRIOL 0.25 MCG: 0.25 CAPSULE ORAL at 09:21

## 2021-03-04 RX ADMIN — DIPHENHYDRAMINE HYDROCHLORIDE 50 MG: 25 TABLET ORAL at 06:50

## 2021-03-04 RX ADMIN — RANOLAZINE 500 MG: 500 TABLET, FILM COATED, EXTENDED RELEASE ORAL at 21:48

## 2021-03-04 RX ADMIN — INSULIN GLARGINE 15 UNITS: 100 INJECTION, SOLUTION SUBCUTANEOUS at 21:45

## 2021-03-04 RX ADMIN — CLOPIDOGREL BISULFATE 75 MG: 75 TABLET ORAL at 09:21

## 2021-03-04 RX ADMIN — INSULIN LISPRO 4 UNITS: 100 INJECTION, SOLUTION INTRAVENOUS; SUBCUTANEOUS at 17:01

## 2021-03-04 RX ADMIN — HEPARIN SODIUM 5000 UNITS: 5000 INJECTION INTRAVENOUS; SUBCUTANEOUS at 06:25

## 2021-03-04 RX ADMIN — RANOLAZINE 500 MG: 500 TABLET, FILM COATED, EXTENDED RELEASE ORAL at 09:21

## 2021-03-04 RX ADMIN — FUROSEMIDE 80 MG: 40 TABLET ORAL at 09:21

## 2021-03-04 RX ADMIN — PREDNISONE 50 MG: 20 TABLET ORAL at 06:34

## 2021-03-04 RX ADMIN — HEPARIN SODIUM 5000 UNITS: 5000 INJECTION INTRAVENOUS; SUBCUTANEOUS at 14:24

## 2021-03-04 RX ADMIN — NITROGLYCERIN 0.4 MG: 0.4 TABLET SUBLINGUAL at 05:22

## 2021-03-04 RX ADMIN — INSULIN LISPRO 5 UNITS: 100 INJECTION, SOLUTION INTRAVENOUS; SUBCUTANEOUS at 21:46

## 2021-03-04 RX ADMIN — AMLODIPINE BESYLATE 5 MG: 5 TABLET ORAL at 14:23

## 2021-03-04 ASSESSMENT — PAIN SCALES - GENERAL
PAINLEVEL_OUTOF10: 0
PAINLEVEL_OUTOF10: 8
PAINLEVEL_OUTOF10: 0
PAINLEVEL_OUTOF10: 4
PAINLEVEL_OUTOF10: 6
PAINLEVEL_OUTOF10: 5
PAINLEVEL_OUTOF10: 2
PAINLEVEL_OUTOF10: 0

## 2021-03-04 ASSESSMENT — PAIN DESCRIPTION - PAIN TYPE
TYPE: ACUTE PAIN

## 2021-03-04 ASSESSMENT — PAIN DESCRIPTION - LOCATION
LOCATION: CHEST

## 2021-03-04 NOTE — PROGRESS NOTES
Patient and girlfriend concerned about BS being 434. Girlfriend just wants to get the BS under control. Patient wanted to wait until doctor got back with us about what to do to lower his BS, in order to take his prednisone. However, hospitalist never got back with us. Patient in bed with dialysis at bedside.      Ashley Velez

## 2021-03-04 NOTE — FLOWSHEET NOTE
03/04/21 0635   Vitals   BP (!) 184/95   Temp 97.8 °F (36.6 °C)   Pulse 96   Resp 20   Weight 186 lb 15.2 oz (84.8 kg)  (standing scale)     Disconnected from cycler per protocol. Total time: 09:16  Total UF: 1028 ml  Total Volume: 6612 ml  1 alarm. Effluent clear without fibrin. Plan to put pt on CCPD this evening if pt's still at hospital  Placed CCPD flow sheets in the chart for EMR scan. Report given to Gómez Grayson RN.

## 2021-03-04 NOTE — PROGRESS NOTES
Vascular Progress Note    3/4/2021 10:24 AM    Chief complaint / Reason for visit : carotid stenosis     Subjective:  Patient sitting up in bed getting ready to work with therapy. No new neurological events noted overnight. He was down for CTA neck this morning which was held due to his history of anaphylaxis. He was premedicated. Patient does report this was about a decade ago and he has had contrast since then and does fine with premedication. Appears hemodynamically stable, afebrile.         Vital Signs: BP (!) 179/89   Pulse 105   Temp 97.8 °F (36.6 °C) (Oral)   Resp 18   Ht 5' 7.5\" (1.715 m)   Wt 186 lb 15.2 oz (84.8 kg) Comment: standing scale  SpO2 96%   BMI 28.85 kg/m²      I/O:      Intake/Output Summary (Last 24 hours) at 3/4/2021 1024  Last data filed at 3/4/2021 3018  Gross per 24 hour   Intake 720 ml   Output 1843 ml   Net -1123 ml       Physical Exam:   General: no apparent distress, appears stated age  Neuro: AAOx4, clear speech, no facial droop, muscle strength equal bilaterally   Chest/Lungs: clear to auscultation bilaterally, no accessory muscle use  Cardiac:  regular rate and rhythm, S1, S2 normal, no murmur, click, rub or gallop  Abdomen:  abdomen is soft without significant tenderness, + PD catheter   Vascular: palpable radial pulses bilaterally   Extremities: warm and well perfused, no signs of cyanosis or ischemia, no significant edema      Labs:   Lab Results   Component Value Date     03/02/2021    K 4.3 03/02/2021    K 4.6 03/01/2021    CL 96 03/02/2021    CO2 27 03/02/2021    BUN 47 03/02/2021    CREATININE 6.7 03/02/2021    GFRAA 10 03/02/2021    GFRAA 44 06/15/2013    LABGLOM 9 03/02/2021    GLUCOSE 356 03/02/2021    PHOS 3.5 03/02/2021    MG 2.20 03/02/2021    CALCIUM 8.9 03/02/2021     Lab Results   Component Value Date    WBC 4.4 03/02/2021    RBC 5.31 03/02/2021    HGB 15.1 03/02/2021    HCT 46.7 03/02/2021    MCV 87.9 03/02/2021    RDW 14.8 03/02/2021     03/02/2021     Lab Results   Component Value Date    INR 1.04 03/01/2021    PROTIME 12.1 03/01/2021        Imaging:    Carotid duplex 3/2/21:      Tech Comments   Right   The right internal carotid artery demonstrates a patent stent, appears to have   a upper limits 50-69% diameter reducing stenosis based on velocity criteria. The right vertebral artery demonstrates normal antegrade flow. The right subclavian artery is visualized and demonstrates multiphasic flow. Left   The left internal carotid artery appears to have a <50% diameter reducing   stenosis based on velocity criteria. The left vertebral artery demonstrates normal antegrade flow. The left subclavian artery is visualized and demonstrates multiphasic flow.      Right ICAPSV/CCAPSV ratio 3.21     MRI brain 3/2/21:  Impression   Mild chronic small vessel ischemic changes.  No acute brain parenchymal   abnormality.       Mucoperiosteal thickening of the sinuses as discussed above.              Scheduled Meds:    insulin lispro  4 Units Subcutaneous TID WC    carvedilol  25 mg Oral BID WC    isosorbide mononitrate  30 mg Oral Daily    furosemide  80 mg Oral Daily    rosuvastatin  40 mg Oral Nightly    insulin glargine  15 Units Subcutaneous Nightly    insulin lispro  0-12 Units Subcutaneous TID WC    insulin lispro  0-6 Units Subcutaneous Nightly    calcitRIOL  0.25 mcg Oral Daily    sodium chloride flush  10 mL Intravenous 2 times per day    aspirin  81 mg Oral Daily    Or    aspirin  300 mg Rectal Daily    heparin (porcine)  5,000 Units Subcutaneous 3 times per day    ranolazine  500 mg Oral BID    clopidogrel  75 mg Oral Daily     Continuous Infusions:    dextrose           Assessment:   Right ICA stenosis with h/o right ICA stent 2016 - duplex showing upper limits right ICA 50-69% stenosis, less than 50% stenosis of left ICA  Left sided weakness and paresthesia - ?  TIA, neurology following   DM type 1  HTN   ESRD on CCPD - nephrology following   CAD s/p CABG, CHF     Plan:  CTA neck was held this morning per radiology due to history of anaphylaxis. Patient has been premedicated and does report getting contrast in the past with premedication. He reports his anaphylaxis was a decade ago. Will discuss further with Dr. Cameron Alvarado and patient and weight risks/benefits of getting CTA neck. Will need increased insulin requirements given the prednisone, will defer management to medical team.  Continue ASA, Plavix and statin therapy. Further plan to follow. Patient educated on plan of care and disease process. All questions answered.         Electronically signed by JAYLEN Morales CNP on 3/4/2021 at 10:24 AM

## 2021-03-04 NOTE — PROGRESS NOTES
Consult received for hyperglycemia. Patient has received three 50 mg doses of prednisone. Chart reviewed, last endo note suggests 18 units lantus nightly, insulin-carb ratio of 1:15, and correction factor of 50 >150. Message sent to Dr. Hay Watts with suggestion of adding prandial insulin (4 units for 60 gm carb/meal diet).

## 2021-03-04 NOTE — PROGRESS NOTES
[facility] Diabetes Education Nurse  Consult Note       NAME:  Colleen Jaeger  MEDICAL RECORD NUMBER:  [id number]  AGE: 50 y.o. GENDER: male  : 1965  TODAY'S DATE:  3/4/2021    Subjective:     Reason for Educator consult ---  Evaluation and Assessment:    Colleen Jaeger is a 50 y.o. male referred by:   [] Physician  [x] Nursing  [] Other:      Patient states diagnosed with type 1 diabetes 40+ years ago. Pt states he will be restarting his insulin pump after discharge and should get his CGM in the next couple of weeks. Pt states he does have testing supplies to use in the meantime. Informed pt of current A1C of 8.1. Pt states his goal is an A1C of less than 8 to qualify for kidney transplant surgery. Pt states he uses excel spreadsheet to compute insulin:carb ratio and dosing. Discussed medications including insulin. Discussed carb counting. Pt states he sometimes carb amounts estimates incorrectly when programming into insulin pump. Pt also has multiple dietary restrictions due to T1DM, CKD, and CHF. Also endorses lack of appetite sometimes after starting meal. Pt states he has purchased Ensure supplements but has not tried them yet. Pt agreeable to dietitian consult. Patient given booklet of written material regarding diabetes titled \"Staying on track\".        PAST MEDICAL HISTORY      Diagnosis Date    Angina at rest [facility])     Cardiomyopathy ([facility]     Carotid artery stenosis 10/25/2016    SUZANNA stented with 8 x 30 mm non-tapered Xact stent    CHF (congestive heart failure) ([facility] 3/3/15    EF 30%     CKD (chronic kidney disease) stage 2, GFR 60-89 ml/min     Coronary artery disease     sp CABG UC    Diabetic peripheral neuropathy (HCC)     Diastolic HF (heart failure) (HCC)     Hyperlipidemia with target LDL less than 70     Hypertension goal BP (blood pressure) < 130/80     PVD (peripheral vascular disease) (HCC)     Seizures (HCC)     Type 1 diabetes mellitus with chronic kidney disease ([facility]     c-peptide <0.1 in 2015       PAST SURGICAL HISTORY  Past Surgical History:   Procedure Laterality Date    CARDIAC CATHETERIZATION      CARDIAC SURGERY      triple bypass    CORONARY ARTERY BYPASS GRAFT      HERNIA REPAIR      LAPAROSCOPY INSERTION PERITONEAL CATHETER N/A 6/3/2020    LAPAROSCOPIC PLACEMENT OF PERITONEAL DIALYSIS  CATHETER performed by Goyo Claire MD at [facility]  Family History   Problem Relation Age of Onset    Coronary Art Dis Father     Diabetes Father     Coronary Art Dis Paternal Grandmother     Diabetes Paternal Grandmother     Hypertension Mother     Other Mother         Epilepsy    Other Sister         Thyroid disease  Heart murmur    Mult Sclerosis Brother     Arthritis Brother     Sleep Apnea Brother        SOCIAL HISTORY  Social History     Tobacco Use    Smoking status: Never Smoker    Smokeless tobacco: Never Used   Substance Use Topics    Alcohol use: Yes     Comment: one drink a month    Drug use: No       ALLERGIES  Allergies   Allergen Reactions    Iodides Anaphylaxis    Shellfish-Derived Products Anaphylaxis    Atorvastatin Calcium Rash       MEDICATIONS  No current facility-administered medications on file prior to encounter. Current Outpatient Medications on File Prior to Encounter   Medication Sig Dispense Refill    Cholecalciferol (VITAMIN D3 ULTRA POTENCY) 1.25 MG (73096 UT) TABS Take 50,000 Units by mouth once a week (on Saturdays)      calcitRIOL (ROCALTROL) 0.25 MCG capsule Take 0.25 mcg by mouth daily      insulin aspart (NOVOLOG FLEXPEN) 100 UNIT/ML injection pen Inject 0-7 Units into the skin 3 times daily (before meals) Per patient sliding scale.       insulin glargine (LANTUS;BASAGLAR) 100 UNIT/ML injection pen Inject 18 Units into the skin nightly      carvedilol (COREG) 25 MG tablet TAKE TWO TABLETS BY MOUTH EVERY MORNING AND TAKE ONE TABLET BY MOUTH EVERY EVENING 90 tablet 5    ranolazine (RANEXA) 500 MG extended release tablet TAKE ONE TABLET BY MOUTH TWICE A DAY 60 tablet 2    hydrALAZINE (APRESOLINE) 100 MG tablet TAKE ONE TABLET BY MOUTH THREE TIMES A DAY 42 tablet 0    isosorbide mononitrate (IMDUR) 30 MG extended release tablet Take 1 tablet by mouth daily 14 tablet 0    amLODIPine (NORVASC) 5 MG tablet Take 5 mg by mouth 2 times daily      furosemide (LASIX) 80 MG tablet Take 80 mg by mouth 2 times daily      Methylcobalamin (B-12) 1000 MCG TBDP Place 1,000 mcg under the tongue daily      aspirin 81 MG tablet Take 1 tablet by mouth daily 90 tablet 3    nitroGLYCERIN (NITROSTAT) 0.4 MG SL tablet DISSOLVE 1 TAB UNDER TONGUE FOR CHEST PAIN - IF PAIN REMAINS AFTER 5 MIN, CALL 911 AND REPEAT DOSE.  MAX 3 TABS IN 15 MINUTES 25 tablet 0    blood glucose test strips (ACCU-CHEK GUIDE) strip TEST BLOOD SUGAR 6 TIMES A  strip 5    EPINEPHrine (EPIPEN 2-JESSICA) 0.3 MG/0.3ML SOAJ injection Inject 0.3 mLs into the skin once for 1 dose Use as directed for allergic reaction 0.3 mL 0       Objective:     LABS    CBC:   Lab Results   Component Value Date    WBC 8.2 03/04/2021    RBC 5.32 03/04/2021    HGB 14.9 03/04/2021    HCT 46.6 03/04/2021    MCV 87.5 03/04/2021    MCH 27.9 03/04/2021    MCHC 31.9 03/04/2021    RDW 14.9 03/04/2021     03/04/2021    MPV 10.4 03/04/2021     CMP:    Lab Results   Component Value Date     03/04/2021    K 4.9 03/04/2021    CL 94 03/04/2021    CO2 24 03/04/2021    BUN 57 03/04/2021    CREATININE 6.9 03/04/2021    GFRAA 10 03/04/2021    GFRAA 44 06/15/2013    AGRATIO 1.4 03/02/2021    LABGLOM 8 03/04/2021    GLUCOSE 439 03/04/2021    PROT 7.1 03/02/2021    PROT 7.9 09/28/2010    LABALBU 4.1 03/02/2021    CALCIUM 9.5 03/04/2021    BILITOT 0.3 03/02/2021    ALKPHOS 129 03/02/2021    AST 10 03/02/2021    ALT 9 03/02/2021       HgBA1c:    Lab Results   Component Value Date    LABA1C 8.1 03/02/2021     Current A1C ordered and pending    This patient's last creatinine was   Recent Labs     03/04/21  1004   CREATININE 6.9*        Recent Blood sugars have been   Lab Results   Component Value Date    POCGLU 353 03/04/2021    POCGLU 418 03/04/2021    POCGLU 434 03/04/2021    POCGLU 244 03/03/2021    POCGLU 208 03/03/2021    POCGLU 191 03/03/2021    POCGLU 298 03/03/2021    POCGLU 315 03/03/2021     Lab Results   Component Value Date    GLUCOSE 439 03/04/2021    GLUCOSE 356 03/02/2021    GLUCOSE 75 03/01/2021    GLUCOSE 298 05/26/2020    GLUCOSE 74 05/04/2020    GLUCOSE 104 05/03/2020            Assessment:     Patient Active Problem List   Diagnosis    D[facility] (diabetic ketoacidoses) [facility]    Coronary artery disease involving native heart with angina pectoris ([facility]    DM type [facility] (diabetes mellitus, type 1) [facility]    Acute kidney injury superimposed on chronic kidney disease ([facility]    PNA (pneumonia)    CRI (chronic renal insufficiency) ([facility])    CHF (congestive heart failure) ([facility])    Hypoglycemia    Diastolic HF (heart failure) ([facility])    Dyslipidemia    CKD (chronic kidney disease) stage 2, GFR 60-89 ml/min    Type 1 diabetes mellitus with chronic kidney disease ([facility]    Diabetic peripheral neuropathy ([facility]    Carotid artery stenosis    PVD (peripheral vascular disease) ([facility]    Diabetic nephropathy associated with type 1 diabetes mellitus ([facility]    Chest pain    Stenosis of right carotid artery    Carotid artery calcification    H/O carotid angioplasty    Pure hypercholesterolemia    Abnormal cardiovascular stress test    Acute encephalopathy    Generalized idiopathic epilepsy, not intractable, without status epilepticus ([facility]    Encephalopathy    NSTEMI (non-ST elevated myocardial infarction) ([facility]    HTN (hypertension), benign    S/P CABG (coronary artery bypass graft)    Ischemic cardiomyopathy    Venous insufficiency of both lower extremities    Atrial fibrillation with rapid ventricular response ([facility])    Syncope    Stage 4 chronic kidney disease (HCC)    NSVT (nonsustained ventricular tachycardia) (HCC)    DKA, type 1, not at goal [facility])    Acute on chronic combined systolic and diastolic CHF (congestive heart failure) ([facility]    Left leg weakness    Arterial ischemic stroke, ICA, right, acute ([facility]    DM (diabetes mellitus), type 2, uncontrolled with complications ([facility])    End-stage renal disease on peritoneal dialysis ([facility]       Plan:     Plan of Care:   Dietitian consulted  MD has adjusted insulin dosing to include prandial Humalog beginning at next meal    Discharge Plan:  Pt plans to restart insulin pump therapy  Pt hope to follow up with  transplant team for kidney and pancreas transplant  Patient to f/u with endo (states in 2 months) and hopes to improve A1C

## 2021-03-04 NOTE — PROGRESS NOTES
Nephrology Progress Note  [phone]  [phone]   [email or web address]    Patient:  Estela Pulido   : 1965    Brief HPI    The patient is a 50 y.o.male with significant past medical history of ESRD on CCPD, Hypertension, DM I, Diabetic neuropathy,  PVD, Seizures, Hyperlipidemia, CAD/CABG/CHF diastolic, KELLY s/p SUZANNA stent presented with generalized weakness, left leg weakness, slurred speech, hypoglycemia. Reported blood glucose of 47 as checked at home.    CT of the head with no acute abnormalities  There are plans for MRI of the brain to r/o CVA    Subjective/Interval history    Pt seen and examined  Had CCPD overnight without issues  BPs uncontrolled  On room air  Had chest pain this AM    Review of Systems   No chest pain or sob    SHx:  No visitors at the bed-side    Meds:  Scheduled Meds:   carvedilol  25 mg Oral BID WC    isosorbide mononitrate  30 mg Oral Daily    furosemide  80 mg Oral Daily    rosuvastatin  40 mg Oral Nightly    insulin glargine  15 Units Subcutaneous Nightly    insulin lispro  0-12 Units Subcutaneous TID WC    insulin lispro  0-6 Units Subcutaneous Nightly    calcitRIOL  0.25 mcg Oral Daily    sodium chloride flush  10 mL Intravenous 2 times per day    aspirin  81 mg Oral Daily    Or    aspirin  300 mg Rectal Daily    heparin (porcine)  5,000 Units Subcutaneous 3 times per day    ranolazine  500 mg Oral BID    clopidogrel  75 mg Oral Daily     Continuous Infusions:   dextrose       PRN Meds:.acetaminophen, nitroGLYCERIN, glucose, dextrose, glucagon (rDNA), dextrose, sodium chloride flush, promethazine **OR** ondansetron, polyethylene glycol, labetalol      Vitals:  BP (!) 176/84   Pulse 96   Temp 97.8 °F (36.6 °C) (Oral)   Resp 18   Ht 5' 7.5\" (1.715 m)   Wt 186 lb 15.2 oz (84.8 kg) Comment: standing scale  SpO2 95%   BMI 28.85 kg/m²     Physical Exam  General : AAOx3, not in pain or respiratory distress, resting in bed  HEENT : mucosa moist.  CVS: S1 S2 normal, regular rhythm, no murmurs or rubs. Lungs: Clear, no wheezing or crackles. Abd: Soft, bowel sounds normal, non-tender. RLQ PD catheter+  Ext: No edema, no cyanosis  Skin: Warm. No rashes appreciated. Labs:  CBC with Differential:    Lab Results   Component Value Date    WBC 4.4 03/02/2021    RBC 5.31 03/02/2021    HGB 15.1 03/02/2021    HCT 46.7 03/02/2021     03/02/2021    MCV 87.9 03/02/2021    MCH 28.5 03/02/2021    MCHC 32.4 03/02/2021    RDW 14.8 03/02/2021    SEGSPCT 85.2 06/15/2013    LYMPHOPCT 29.4 03/01/2021    MONOPCT 15.3 03/01/2021    EOSPCT 2.1 09/28/2010    BASOPCT 0.5 03/01/2021    MONOSABS 0.6 03/01/2021    LYMPHSABS 1.2 03/01/2021    EOSABS 0.2 03/01/2021    BASOSABS 0.0 03/01/2021    DIFFTYPE Scan-K 06/15/2013     BMP:    Lab Results   Component Value Date     03/02/2021    K 4.3 03/02/2021    K 4.6 03/01/2021    CL 96 03/02/2021    CO2 27 03/02/2021    BUN 47 03/02/2021    LABALBU 4.1 03/02/2021    CREATININE 6.7 03/02/2021    CALCIUM 8.9 03/02/2021    GFRAA 10 03/02/2021    GFRAA 44 06/15/2013    LABGLOM 9 03/02/2021    GLUCOSE 356 03/02/2021     Ionized Calcium:  No results found for: IONCA  Magnesium:    Lab Results   Component Value Date    MG 2.20 03/02/2021     Phosphorus:    Lab Results   Component Value Date    PHOS 3.5 03/02/2021     U/A:    Lab Results   Component Value Date    COLORU YELLOW 05/01/2020    PROTEINU 100 05/01/2020    PHUR 6.0 05/01/2020    PHUR 6.0 05/01/2020    WBCUA 0 05/01/2020    RBCUA 29 05/01/2020    MUCUS 1+ 05/25/2010    BACTERIA Rare 05/25/2010    CLARITYU Clear 05/01/2020    SPECGRAV 1.012 05/01/2020    LEUKOCYTESUR Negative 05/01/2020    UROBILINOGEN 1.0 05/01/2020    BILIRUBINUR Negative 05/01/2020    BILIRUBINUR NEGATIVE 05/25/2010    BLOODU SMALL 05/01/2020    GLUCOSEU 250 05/01/2020    GLUCOSEU >=1000 05/25/2010       Assessment/Plan:    1. ESRD on CCPD  On CCPD with FV of 2200 ml, 3 exchanges.  TV of 6600 ml   Noted notes from dialysis RN  Continue current regimen. 2. Left hemiparesis and paresthesias  ? TIA  CT/MRI with no evidence of acute CVA  CTA neck planned  Neuro on board    3. Acute Encephalopathy improved  Secondary to hypoglycemia     4. Hyponatremia mild  continue fluid restriction    5. CKD-MBD  Continue calcitriol    6. Hypertension controlled  Antihypertensives are currently on hold  CVA ruled out  Resume hydralazine and amlodipine    7. Chest pain  Cards consulted    Timur Johnson MD.  3/4/2021  Office Phone : [phone]    Thank you for allowing us to participate in the care of this pt. I willcontinue to follow along. Please call with questions or concerns.

## 2021-03-04 NOTE — FLOWSHEET NOTE
03/03/21 1850   Vitals   BP (!) 149/75   Temp 97.8 °F (36.6 °C)   Temp Source Oral   Pulse 89   Resp 18   Weight 190 lb 4.1 oz (86.3 kg)  (standing scale)   Peritoneal Dialysis Catheter Intermittent Right lower abdomen   No Placement Date or Time found. Dialysis Type: Intermittent  Catheter Location: Right lower abdomen   Status Accessed   Site Condition No Complications   Dressing Status Clean;Dry; Intact   Dressing Occlusive   Cycler   Verification of Prescription CCPD   Total Volume Programmed 6600 mL   Therapy Time (Hours:Minutes) 09:00   Fill Volume 2200 mL   Last Fill Volume 0 mL   Dextrose Setting Same (Nonextraneal)   I Drain Alarm 0 mL   Number of Cycles 3   Bag Volume 5000 mL   Number of Bags Used 2   Dianeal Solution Other (Comment)  (delflex 2.5% in 5000 ml bag)   I Drain (mL) 145 mL     Pt connected using aseptic technique, tx started

## 2021-03-04 NOTE — PROGRESS NOTES
[facility] PROGRESS NOTE    3/4/2021 11:10 AM        Name: Claudy Wilkes .              Admitted: 3/1/2021  Primary Care Provider: No primary care provider on file.  (Tel: None)          Subjective:    Patient lying in bed chest pain today 7 out of 10 midsternal felt different than when he had his heart attack some shortness of breath no nausea vomiting    Reviewed interval ancillary notes    Current Medications      insulin lispro (1 Unit Dial) 4 Units, TID WC      insulin lispro (1 Unit Dial) 0-18 Units, TID WC      insulin lispro (1 Unit Dial) 0-9 Units, Nightly      insulin glargine (LANTUS;BASAGLAR) injection pen 7 Units, Nightly      acetaminophen (TYLENOL) tablet 650 mg, Q4H PRN      nitroGLYCERIN (NITROSTAT) SL tablet 0.4 mg, Q5 Min PRN      carvedilol (COREG) tablet 25 mg, BID WC      isosorbide mononitrate (IMDUR) extended release tablet 30 mg, Daily      furosemide (LASIX) tablet 80 mg, Daily      rosuvastatin (CRESTOR) tablet 40 mg, Nightly      insulin glargine (LANTUS;BASAGLAR) injection pen 15 Units, Nightly      calcitRIOL (ROCALTROL) capsule 0.25 mcg, Daily      glucose (GLUTOSE) 40 % oral gel 15 g, PRN      dextrose 50 % IV solution, PRN      glucagon (rDNA) injection 1 mg, PRN      dextrose 5 % solution, PRN      sodium chloride flush 0.9 % injection 10 mL, 2 times per day      sodium chloride flush 0.9 % injection 10 mL, PRN      promethazine (PHENERGAN) tablet 12.5 mg, Q6H PRN    Or      ondansetron (ZOFRAN) injection 4 mg, Q6H PRN      polyethylene glycol (GLYCOLAX) packet 17 g, Daily PRN      aspirin EC tablet 81 mg, Daily    Or      aspirin suppository 300 mg, Daily      labetalol (NORMODYNE;TRANDATE) injection 10 mg, Q10 Min PRN      heparin (porcine) injection 5,000 Units, 3 times per day      ranolazine (RANEXA) extended release tablet 500 mg, BID      clopidogrel (PLAVIX) tablet 75 mg, Daily        Objective:  BP (!) 179/89   Pulse 105   Temp 97.8 °F (36.6 °C) (Oral)   Resp 18   Ht 5' 7.5\" (1.715 m)   Wt 186 lb 15.2 oz (84.8 kg) Comment: standing scale  SpO2 96%   BMI 28.85 kg/m²     Intake/Output Summary (Last 24 hours) at 3/4/2021 1110  Last data filed at 3/4/2021 0653  Gross per 24 hour   Intake 720 ml   Output 1843 ml   Net -1123 ml      Wt Readings from Last 3 Encounters:   03/04/21 186 lb 15.2 oz (84.8 kg)   02/09/21 197 lb 12.8 oz (89.7 kg)   12/29/20 200 lb (90.7 kg)       General appearance:  Appears comfortable. AAOx3  HEENT: atraumatic, Pupils equal, muscous membranes moist, no masses appreciated  Cardiovascular: Regular rate and rhythm no murmurs appreciated  Respiratory: CTAB no wheezing  Gastrointestinal: Abdomen soft, non-tender, BS+ pd site clean  EXT: no edema  Neurology: no gross focal deficits  Psychiatry: Appropriate affect. Not agitated  Skin: Warm, dry, no rashes appreciated    Labs and Tests:  CBC:   Recent Labs     03/01/21  1528 03/02/21  0459 03/04/21  1004   WBC 4.2 4.4 8.2   HGB 15.9 15.1 14.9    146 161     BMP:    Recent Labs     03/01/21  1528 03/02/21  0459 03/04/21  1004    134* 130*   K 4.6 4.3 4.9   CL 99 96* 94*   CO2 25 27 24   BUN 44* 47* 57*   CREATININE 7.0* 6.7* 6.9*   GLUCOSE 75 356* 439*     Hepatic:   Recent Labs     03/01/21  1528 03/02/21  0459   AST 15 10*   ALT 11 9*   BILITOT 0.3 0.3   ALKPHOS 129 129     MRI brain without contrast   Preliminary Result   Mild chronic small vessel ischemic changes. No acute brain parenchymal   abnormality. Mucoperiosteal thickening of the sinuses as discussed above. VL DUP CAROTID BILATERAL         XR CHEST PORTABLE   Final Result   No acute cardiopulmonary process. CT HEAD WO CONTRAST   Final Result   No acute intracranial abnormality. Paranasal sinus disease as above.   This includes complete opacification of   right frontal sinus to include high attenuation material which likely   reflects inspissated mucus material.  Fungal infection felt much less likely   but suggest clinical correlation. Findings were discussed with Jatinder Ernst at 4:00 pm on 3/1/2021. CTA NECK W CONTRAST    (Results Pending)       Recent imaging reviewed    Problem List  Active Problems:    Dyslipidemia    Left leg weakness    Arterial ischemic stroke, ICA, right, acute (HCC)    DM (diabetes mellitus), type 2, uncontrolled with complications (HCC)    End-stage renal disease on peritoneal dialysis [facility])  Resolved Problems:    * No resolved hospital problems. *       Assessment & Plan:   TIA  --vascualr to decide on cta neck given hx  - on prednisone for dye allergy    Chest pain: will get troponin cards consult    Dysphagia secondary to above  - speech dysphagia 3 diet    Dm 1 with hyperglycemia secondary to steroids  - increase dose of lantus and ssi    ESRD on PD  - nephro consulted        Diet: DIET DYSPHAGIA SOFT AND BITE-SIZED; Carb Control: 4 carb choices (60 gms)/meal; Daily Fluid Restriction: 1500 ml;  No Drinking Straw  Code:Full Code  DVT Mell Quesada MD   3/4/2021 11:10 AM

## 2021-03-04 NOTE — PROGRESS NOTES
Message sent to MD regarding BG. Pt was refusing to take the prednisone until he could get  ore insulin. Rechecked BG and dosed pt for breakfast and gave the prednisone.    Jose Alejandro Maldonado RN

## 2021-03-04 NOTE — PROGRESS NOTES
Pt up with therapy complained of chest pain 9/10. Pt sat down and given nitro per PRN order. Pt laid in bed STAT EKG completed.  2 mins after nitro pt rated chest pain a 3/10

## 2021-03-04 NOTE — CONSULTS
[facility]  [phone]      Chief Complaint   Patient presents with    Extremity Weakness     Pt from home, states his glucose was low and he drank OJ, very fatigued, slurred speech, lethargic. History of Present Illness:  Felipe Mathur is a 50 y.o. patient who presented to the hospital with complaints of leg numbness. Found to have a stroke. Had some sharp stabbing chest pain today. Has since resolved. He has chronic stable angina treated medically. I have been asked to provide consultation regarding further management and testing. 50 y. o. male with hx CAD s/p CABG, carotid artery stenosis, type 1 DM, combined systolic/diastolic CHF, CKD stage 2, HTN, HLD, and PVD, Afib.     Past Medical History:   has a past medical history of Angina at rest [facility]), Cardiomyopathy ([facility] [address] Carotid artery stenosis, CHF (congestive heart failure) ([facility] [address] CKD (chronic kidney disease) stage 2, GFR 60-89 ml/min, Coronary artery disease, Diabetic peripheral neuropathy ([facility] [address] Diastolic HF (heart failure) ([facility] [address] Hyperlipidemia with target LDL less than 70, Hypertension goal BP (blood pressure) < 130/80, PVD (peripheral vascular disease) ([facility] [address] Seizures ([facility] [address] and Type 1 diabetes mellitus with chronic kidney disease ([facility] [address] Surgical History:   has a past surgical history that includes Cardiac surgery; Coronary artery bypass graft; Cardiac catheterization; hernia repair; Tonsillectomy; and LAPAROSCOPY INSERTION PERITONEAL CATHETER (N/A, 6/3/2020). Social History:   reports that he has never smoked. He has never used smokeless tobacco. He reports current alcohol use. He reports that he does not use drugs. Family History:  family history includes Arthritis in his brother; Coronary Art Dis in his father and paternal grandmother; Diabetes in his father and paternal grandmother; Hypertension in his mother; Mult Sclerosis in his brother; Other in his mother and sister; Sleep Apnea in his brother. Home Medications:  Were reviewed and are listed in nursing record. and/or listed below  Prior to Admission medications    Medication Sig Start Date End Date Taking? Authorizing Provider   aspirin 81 MG EC tablet Take 1 tablet by mouth daily 3/4/21  Yes Shakira Hoff MD   clopidogrel (PLAVIX) 75 MG tablet Take 1 tablet by mouth daily 3/4/21  Yes Shakira Hoff MD   rosuvastatin (CRESTOR) 40 MG tablet Take 1 tablet by mouth nightly 3/3/21  Yes Shakira Hoff MD   Cholecalciferol (VITAMIN D3 ULTRA POTENCY) 1.25 MG (66689 UT) TABS Take 50,000 Units by mouth once a week (on Saturdays)   Yes Historical Provider, MD   calcitRIOL (ROCALTROL) 0.25 MCG capsule Take 0.25 mcg by mouth daily   Yes Historical Provider, MD   insulin aspart (NOVOLOG FLEXPEN) 100 UNIT/ML injection pen Inject 0-7 Units into the skin 3 times daily (before meals) Per patient sliding scale.    Yes Historical Provider, MD   insulin glargine (LANTUS;BASAGLAR) 100 UNIT/ML injection pen Inject 18 Units into the skin nightly   Yes Historical Provider, MD   carvedilol (COREG) 25 MG tablet TAKE TWO TABLETS BY MOUTH EVERY MORNING AND TAKE ONE TABLET BY MOUTH EVERY EVENING 1/12/21  Yes JAYLEN Dunbar CNP   ranolazine (RANEXA) 500 MG extended release tablet TAKE ONE TABLET BY MOUTH TWICE A DAY 1/12/21  Yes JAYLEN Dunbar CNP   hydrALAZINE (APRESOLINE) 100 MG tablet TAKE ONE TABLET BY MOUTH THREE TIMES A DAY 11/6/20  Yes Ahmet Carmen MD   isosorbide mononitrate (IMDUR) 30 MG extended release tablet Take 1 tablet by mouth daily 11/6/20  Yes Ahmet Carmen MD   amLODIPine (NORVASC) 5 MG tablet Take 5 mg by mouth 2 times daily   Yes Historical Provider, MD   furosemide (LASIX) 80 MG tablet Take 80 mg by mouth 2 times daily   Yes Historical Provider, MD   Methylcobalamin (B-12) 1000 MCG TBDP Place 1,000 mcg under the tongue daily   Yes Historical Provider, MD   aspirin 81 MG tablet Take 1 tablet by mouth daily 10/26/16  Yes Anali Mcallister JAYLEN Rosales CNP   nitroGLYCERIN (NITROSTAT) 0.4 MG SL tablet DISSOLVE 1 TAB UNDER TONGUE FOR CHEST PAIN - IF PAIN REMAINS AFTER 5 MIN, CALL 911 AND REPEAT DOSE.  MAX 3 TABS IN 15 MINUTES 1/12/21   JAYLEN Kitchen CNP   blood glucose test strips (ACCU-CHEK GUIDE) strip TEST BLOOD SUGAR 6 TIMES A DAY 7/30/20   Rosita Ca MD   EPINEPHrine (EPIPEN 2-JESSICA) 0.3 MG/0.3ML SOAJ injection Inject 0.3 mLs into the skin once for 1 dose Use as directed for allergic reaction 6/11/20 3/1/21  JAYLEN Kitchen CNP        Current Medications:  Current Facility-Administered Medications   Medication Dose Route Frequency Provider Last Rate Last Admin    insulin lispro (1 Unit Dial) 4 Units  4 Units Subcutaneous TID  Natalya Palomino MD   4 Units at 03/04/21 1305    insulin lispro (1 Unit Dial) 0-18 Units  0-18 Units Subcutaneous TID  Natalya Palomino MD   15 Units at 03/04/21 1305    insulin lispro (1 Unit Dial) 0-9 Units  0-9 Units Subcutaneous Nightly Natalya Palomino MD        insulin glargine (LANTUS;BASAGLAR) injection pen 5 Units  5 Units Subcutaneous Once Natalya Palomino MD        amLODIPine (NORVASC) tablet 5 mg  5 mg Oral Daily Phyllis Martinez MD        hydrALAZINE (APRESOLINE) tablet 100 mg  100 mg Oral 3 times per day Phyllis Martinez MD        acetaminophen (TYLENOL) tablet 650 mg  650 mg Oral Q4H PRN JAYLEN Ureña CNP        nitroGLYCERIN (NITROSTAT) SL tablet 0.4 mg  0.4 mg Sublingual Q5 Min PRN JAYLEN Ureña CNP   0.4 mg at 03/04/21 0937    carvedilol (COREG) tablet 25 mg  25 mg Oral BID  Phyllis Martinez MD   25 mg at 03/04/21 0921    isosorbide mononitrate (IMDUR) extended release tablet 30 mg  30 mg Oral Daily Phyllis Martinez MD   30 mg at 03/04/21 0921    furosemide (LASIX) tablet 80 mg  80 mg Oral Daily Phyllis Martinez MD   80 mg at 03/04/21 4436    rosuvastatin (CRESTOR) tablet 40 mg  40 mg Oral Nightly Josef García MD   40 mg at 03/03/21 2103    insulin glargine (LANTUS;BASAGLAR) injection pen 15 Units  15 Units Subcutaneous Nightly Jesus Rogers MD   15 Units at 03/03/21 2108    calcitRIOL (ROCALTROL) capsule 0.25 mcg  0.25 mcg Oral Daily Sisi Richarsd MD   0.25 mcg at 03/04/21 0921    glucose (GLUTOSE) 40 % oral gel 15 g  15 g Oral PRN Soraya Rouse MD        dextrose 50 % IV solution  12.5 g Intravenous PRN Soraya Rouse MD        glucagon (rDNA) injection 1 mg  1 mg Intramuscular PRN Soraya Rouse MD        dextrose 5 % solution  100 mL/hr Intravenous PRN Soraya Rouse MD        sodium chloride flush 0.9 % injection 10 mL  10 mL Intravenous 2 times per day Soraya Rouse MD   10 mL at 03/04/21 0920    sodium chloride flush 0.9 % injection 10 mL  10 mL Intravenous PRN Soraya Rouse MD        promethazine (PHENERGAN) tablet 12.5 mg  12.5 mg Oral Q6H PRN Soraya Rouse MD        Or    ondansetron (ZOFRAN) injection 4 mg  4 mg Intravenous Q6H PRN Soraya Rouse MD        polyethylene glycol (GLYCOLAX) packet 17 g  17 g Oral Daily PRN Soraya Rouse MD   17 g at 03/02/21 2244    aspirin EC tablet 81 mg  81 mg Oral Daily Soraya Rouse MD   81 mg at 03/04/21 0920    Or    aspirin suppository 300 mg  300 mg Rectal Daily Soraya Rouse MD        labetalol (NORMODYNE;TRANDATE) injection 10 mg  10 mg Intravenous Q10 Min PRN Soraya Rouse MD        heparin (porcine) injection 5,000 Units  5,000 Units Subcutaneous 3 times per day Soraya Rouse MD   5,000 Units at 03/04/21 3158    ranolazine (RANEXA) extended release tablet 500 mg  500 mg Oral BID Soraya Rouse MD   500 mg at 03/04/21 0921    clopidogrel (PLAVIX) tablet 75 mg  75 mg Oral Daily Soraya Rouse MD   75 mg at 03/04/21 0519        Allergies: Iodides, Shellfish-derived products, and Atorvastatin calcium     Review of Systems:     · Constitutional: there has been no unanticipated weight loss.  There's been no change in energy level, sleep pattern, or activity level. · Eyes: No visual changes or diplopia. No scleral icterus. · ENT: No Headaches, hearing loss or vertigo. No mouth sores or sore throat. · Cardiovascular: Reviewed in HPI  · Respiratory: No cough or wheezing, no sputum production. No hematemesis. · Gastrointestinal: No abdominal pain, appetite loss, blood in stools. No change in bowel or bladder habits. · Genitourinary: No dysuria, trouble voiding, or hematuria. · Musculoskeletal:  No gait disturbance, weakness or joint complaints. · Integumentary: No rash or pruritis. · Neurological: No headache, diplopia, change in muscle strength, numbness or tingling. No change in gait, balance, coordination, mood, affect, memory, mentation, behavior. · Psychiatric: No anxiety, no depression. · Endocrine: No malaise, fatigue or temperature intolerance. No excessive thirst, fluid intake, or urination. No tremor. · Hematologic/Lymphatic: No abnormal bruising or bleeding, blood clots or swollen lymph nodes. · Allergic/Immunologic: No nasal congestion or hives.   ·     Physical Examination:    Vitals:    03/04/21 1200   BP: (!) 162/82   Pulse: 90   Resp: 16   Temp: 97.7 °F (36.5 °C)   SpO2: 99%    Weight: 186 lb 15.2 oz (84.8 kg)(standing scale)         General Appearance:  Alert, cooperative, no distress, appears stated age   Head:  Normocephalic, without obvious abnormality, atraumatic   Eyes:  PERRL, conjunctiva/corneas clear       Nose: Nares normal, no drainage or sinus tenderness   Throat: Lips, mucosa, and tongue normal   Neck: Supple, symmetrical, trachea midline, no adenopathy, thyroid: not enlarged, symmetric, no tenderness/mass/nodules, no carotid bruit or JVD       Lungs:   Clear to auscultation bilaterally, respirations unlabored   Chest Wall:  No tenderness or deformity   Heart:  Regular rate and rhythm, S1, S2 normal, no murmur, rub or gallop   Abdomen:   Soft, non-tender, bowel sounds active all four quadrants,  no masses, no organomegaly           Extremities: Extremities normal, atraumatic, no cyanosis or edema   Pulses: 2+ and symmetric   Skin: Skin color, texture, turgor normal, no rashes or lesions   Pysch: Normal mood and affect   Neurologic: Normal gross motor and sensory exam.         Labs  CBC:   Lab Results   Component Value Date    WBC 8.2 03/04/2021    RBC 5.32 03/04/2021    HGB 14.9 03/04/2021    HCT 46.6 03/04/2021    MCV 87.5 03/04/2021    RDW 14.9 03/04/2021     03/04/2021     CMP:    Lab Results   Component Value Date     03/04/2021    K 4.9 03/04/2021    CL 94 03/04/2021    CO2 24 03/04/2021    BUN 57 03/04/2021    CREATININE 6.9 03/04/2021    GFRAA 10 03/04/2021    GFRAA 44 06/15/2013    AGRATIO 1.4 03/02/2021    LABGLOM 8 03/04/2021    GLUCOSE 439 03/04/2021    PROT 7.1 03/02/2021    PROT 7.9 09/28/2010    CALCIUM 9.5 03/04/2021    BILITOT 0.3 03/02/2021    ALKPHOS 129 03/02/2021    AST 10 03/02/2021    ALT 9 03/02/2021     PT/INR:  No results found for: PTINR  Lab Results   Component Value Date    CKTOTAL 143 05/01/2020    CKMB 1.92 05/26/2010    CKMBINDEX 1.8 05/26/2010    TROPONINI 0.04 (H) 03/04/2021       EKG:  I have reviewed EKG with the following interpretation:  Impression:  Sinus tachycardiaPossible Left atrial enlargementBorderline     Pt has CAD with following history:  CABG: (date/details),   Valve replacement (date/details)   GXT/Myoview/Lexiscan: (date)  (results)   Stress/echo: (date) (result)   CATH/PCI:  (date)- (results)  - (# and type of stents) -   Procedures: LHC, Cor angio, SVG angio, LIMA angio     LM 20%  % ostium  Cx 100% prox. (prior cath had small OM1 visable that is now occluded)   RCA dominant, diffusely diseased w/ distal 90% in area artery is approx 1 mm in diameter. No sig change from 2017. LIMA to LAD patent  SVG to OM2 40%  LVEDP 28 mmHg     Med mgmt  EP eval for NSVT, Afib  Resume AC.  Heparin drip pending EP eval.   BP and fluid mgmt per nephrology   No ACE/ARB due to CKD  DAPT, statin, BBlk  ECHO: (date) results EF    %. LIBERTAD (date) (results)  EP procedures/studies/ablation:  (specific data). Device information:  Event monitor: (date/results)    All testing and labs listed below were personally reviewed.     Assessment  Patient Active Problem List   Diagnosis    DKA (diabetic ketoacidoses) ([facility]    Coronary artery disease involving native heart with angina pectoris ([facility] [address]    DM type 1 (diabetes mellitus, type 1) ([facility] [address]    Acute kidney injury superimposed on chronic kidney disease (HCC)    PNA (pneumonia)    CRI (chronic renal insufficiency) ([facility])    CHF (congestive heart failure) ([facility])    Hypoglycemia    Diastolic HF (heart failure) ([facility])    Dyslipidemia    CKD (chronic kidney disease) stage 2, GFR 60-89 ml/min    Type 1 diabetes mellitus with chronic kidney disease ([facility] [address]    Diabetic peripheral neuropathy ([facility])    Carotid artery stenosis    PVD (peripheral vascular disease) ([facility] [address]    Diabetic nephropathy associated with type 1 diabetes mellitus ([facility] [address]    Chest pain    Stenosis of right carotid artery    Carotid artery calcification    H/O carotid angioplasty    Pure hypercholesterolemia    Abnormal cardiovascular stress test    Acute encephalopathy    Generalized idiopathic epilepsy, not intractable, without status epilepticus ([facility] [address]    Encephalopathy    NSTEMI (non-ST elevated myocardial infarction) ([facility] [address]    HTN (hypertension), benign    S/P CABG (coronary artery bypass graft)    Ischemic cardiomyopathy    Venous insufficiency of both lower extremities    Atrial fibrillation with rapid ventricular response ([facility])    Syncope    Stage 4 chronic kidney disease ([facility])    NSVT (nonsustained ventricular tachycardia) ([facility] [address]    DKA, type 1, not at goal [facility])    Acute on chronic combined systolic and diastolic CHF (congestive heart failure) ([facility])    Left leg weakness    Arterial ischemic stroke, ICA, right, acute ([facility]    DM (diabetes mellitus), type 2, uncontrolled with complications ([facility]    End-stage renal disease on peritoneal dialysis ([facility]         Plan:    I had the opportunity to review the clinical symptoms and presentation of Te Garay.     Assessment/Plan:  Active Problems:      HTN: coreg, lasix, norvasc, hydralazine. Controlled. Chest pain: chronic stable angina. No plan for ischemic evaluation. Unable to due cath due to acute stroke. Troponin elevated due to ESRD. Cont home medications including nitro and ranexa. CAD: aspirin, plavix    I will address the patient's cardiac risk factors and adjusted pharmacologic treatment as needed. In addition, I have reinforced the need for patient directed risk factor modification. Tobacco use was discussed with the patient and educated on the negative effects. I have asked the patient to not utilize these agents. Thank you for allowing to us to participate in the care or [place]. Further evaluation will be based upon the patient's clinical course and testing results. All questions and concerns were addressed to the patient/family. Alternatives to my treatment were discussed. The note was completed using EMR. Every effort was made to ensure accuracy; however, inadvertent computerized transcription errors may be present.     Jessa Frey MD 3/4/2021 1:58 PM

## 2021-03-04 NOTE — PROGRESS NOTES
Patient stated that the pressure in his chest was starting to build up again. Administered nitro sl. Will reassess.      [facility]

## 2021-03-04 NOTE — PROGRESS NOTES
[facility]    3/4/2021 11:09 AM        Name: Lamar Padilla .              Admitted: 3/1/2021  Primary Care Provider: No primary care provider on file.  (Tel: None)          Subjective:    Lying in bed feeling much better numbness is resolved weakness is resolved    Reviewed interval ancillary notes    Current Medications      insulin lispro (1 Unit Dial) 4 Units, TID WC      insulin lispro (1 Unit Dial) 0-18 Units, TID WC      insulin lispro (1 Unit Dial) 0-9 Units, Nightly      insulin glargine (LANTUS;BASAGLAR) injection pen 7 Units, Nightly      acetaminophen (TYLENOL) tablet 650 mg, Q4H PRN      nitroGLYCERIN (NITROSTAT) SL tablet 0.4 mg, Q5 Min PRN      carvedilol (COREG) tablet 25 mg, BID WC      isosorbide mononitrate (IMDUR) extended release tablet 30 mg, Daily      furosemide (LASIX) tablet 80 mg, Daily      rosuvastatin (CRESTOR) tablet 40 mg, Nightly      insulin glargine (LANTUS;BASAGLAR) injection pen 15 Units, Nightly      calcitRIOL (ROCALTROL) capsule 0.25 mcg, Daily      glucose (GLUTOSE) 40 % oral gel 15 g, PRN      dextrose 50 % IV solution, PRN      glucagon (rDNA) injection 1 mg, PRN      dextrose 5 % solution, PRN      sodium chloride flush 0.9 % injection 10 mL, 2 times per day      sodium chloride flush 0.9 % injection 10 mL, PRN      promethazine (PHENERGAN) tablet 12.5 mg, Q6H PRN    Or      ondansetron (ZOFRAN) injection 4 mg, Q6H PRN      polyethylene glycol (GLYCOLAX) packet 17 g, Daily PRN      aspirin EC tablet 81 mg, Daily    Or      aspirin suppository 300 mg, Daily      labetalol (NORMODYNE;TRANDATE) injection 10 mg, Q10 Min PRN      heparin (porcine) injection 5,000 Units, 3 times per day      ranolazine (RANEXA) extended release tablet 500 mg, BID      clopidogrel (PLAVIX) tablet 75 mg, Daily        Objective:  BP (!) 179/89   Pulse 105   Temp 97.8 °F (36.6 °C) (Oral)   Resp 18   Ht 5' 7.5\" (1.715 m)   Wt 186 lb 15.2 oz (84.8 kg) Comment: standing scale  SpO2 96%   BMI 28.85 kg/m²     Intake/Output Summary (Last 24 hours) at 3/4/2021 1109  Last data filed at 3/4/2021 0653  Gross per 24 hour   Intake 720 ml   Output 1843 ml   Net -1123 ml      Wt Readings from Last 3 Encounters:   03/04/21 186 lb 15.2 oz (84.8 kg)   02/09/21 197 lb 12.8 oz (89.7 kg)   12/29/20 200 lb (90.7 kg)       General appearance:  Appears comfortable. AAOx3  HEENT: atraumatic, Pupils equal, muscous membranes moist, no masses appreciated  Cardiovascular: Regular rate and rhythm no murmurs appreciated  Respiratory: CTAB no wheezing  Gastrointestinal: Abdomen soft, non-tender, BS+ pd site clean  EXT: no edema  Neurology: no gross focal deficits  Psychiatry: Appropriate affect. Not agitated  Skin: Warm, dry, no rashes appreciated    Labs and Tests:  CBC:   Recent Labs     03/01/21  1528 03/02/21  0459 03/04/21  1004   WBC 4.2 4.4 8.2   HGB 15.9 15.1 14.9    146 161     BMP:    Recent Labs     03/01/21  1528 03/02/21  0459 03/04/21  1004    134* 130*   K 4.6 4.3 4.9   CL 99 96* 94*   CO2 25 27 24   BUN 44* 47* 57*   CREATININE 7.0* 6.7* 6.9*   GLUCOSE 75 356* 439*     Hepatic:   Recent Labs     03/01/21  1528 03/02/21  0459   AST 15 10*   ALT 11 9*   BILITOT 0.3 0.3   ALKPHOS 129 129     MRI brain without contrast   Preliminary Result   Mild chronic small vessel ischemic changes. No acute brain parenchymal   abnormality. Mucoperiosteal thickening of the sinuses as discussed above. VL DUP CAROTID BILATERAL         XR CHEST PORTABLE   Final Result   No acute cardiopulmonary process. CT HEAD WO CONTRAST   Final Result   No acute intracranial abnormality. Paranasal sinus disease as above.   This includes complete opacification of   right frontal sinus to include high attenuation material which likely   reflects inspissated mucus material.  Fungal infection felt much less likely   but suggest clinical correlation. Findings were discussed with Vivian Flores at 4:00 pm on 3/1/2021. CTA NECK W CONTRAST    (Results Pending)       Recent imaging reviewed    Problem List  Active Problems:    Dyslipidemia    Left leg weakness    Arterial ischemic stroke, ICA, right, acute (HCC)    DM (diabetes mellitus), type 2, uncontrolled with complications (HCC)    End-stage renal disease on peritoneal dialysis [facility])  Resolved Problems:    * No resolved hospital problems. *       Assessment & Plan:   ? CVA : with resolving leg leg weakness  -- CTA neck per vasuclar and neuro    Dysphagia secondary to above  - speech dysphagia 3 diet    Dm 1 with hypogylcemia and acute metabolic encephalopathy  - improved  - start reduced lantus    ESRD on PD  - nephro consulted        Diet: DIET DYSPHAGIA SOFT AND BITE-SIZED; Carb Control: 4 carb choices (60 gms)/meal; Daily Fluid Restriction: 1500 ml;  No Drinking Straw  Code:Full Code  DVT PPXheparin  Disposition pt ot and wokr up pending      Cori Jacobs MD   3/4/2021 11:09 AM

## 2021-03-04 NOTE — PROGRESS NOTES
Lamar Padilla  Neurology Follow-up  [facility]    Date of Service: 3/4/2021    Subjective:   CC: Follow up today regarding: New onset left-sided paresthesia and weakness. Events noted. Chart and lab reviewed. He went for his CTA of the head and neck today but for unclear reason the test was not completed. He denies any new symptoms today. No significant weakness or numbness or headache or dysphagia or dysarthria. Other review of system was unremarkable. ROS : A 10-12 system review obtained and updated today and is unremarkable except as mentioned  in my interval history. family history includes Arthritis in his brother; Coronary Art Dis in his father and paternal grandmother; Diabetes in his father and paternal grandmother; Hypertension in his mother; Mult Sclerosis in his brother; Other in his mother and sister; Sleep Apnea in his brother.     Past Medical History:   Diagnosis Date    Angina at rest ([address]     Cardiomyopathy [facility]     Carotid artery stenosis 10/25/2016    SUZANNA stented with 8 x 30 mm non-tapered Xact stent    CHF (congestive heart failure) ([facility] 3/3/15    EF 30%     CKD (chronic kidney disease) stage 2, GFR 60-89 ml/min     Coronary artery disease     sp CABG [facility]    Diabetic peripheral neuropathy (HCC)     Diastolic HF (heart failure) (HCC)     Hyperlipidemia with target LDL less than 70     Hypertension goal BP (blood pressure) < 130/80     PVD (peripheral vascular disease) (HCC)     Seizures (HCC)     Type 1 diabetes mellitus with chronic kidney disease (HCC)     c-peptide <0.1 in 2015     Current Facility-Administered Medications   Medication Dose Route Frequency Provider Last Rate Last Admin    insulin lispro (1 Unit Dial) 4 Units  4 Units Subcutaneous TID WC Gloria Rios MD        acetaminophen (TYLENOL) tablet 650 mg  650 mg Oral Q4H PRN JAYLEN Ureña - CNP        nitroGLYCERIN (NITROSTAT) SL tablet 0.4 mg  0.4 mg Sublingual Q5 Min PRN JAYLEN Frank - CNP   0.4 mg at 03/04/21 1957    carvedilol (COREG) tablet 25 mg  25 mg Oral BID  Timur Johnson MD   25 mg at 03/04/21 0921    isosorbide mononitrate (IMDUR) extended release tablet 30 mg  30 mg Oral Daily Timur Johnson MD   30 mg at 03/04/21 0921    furosemide (LASIX) tablet 80 mg  80 mg Oral Daily Timur Johnson MD   80 mg at 03/04/21 4950    rosuvastatin (CRESTOR) tablet 40 mg  40 mg Oral Nightly Avani Rene MD   40 mg at 03/03/21 2103    insulin glargine (LANTUS;BASAGLAR) injection pen 15 Units  15 Units Subcutaneous Nightly Shakira Hoff MD   15 Units at 03/03/21 2108    insulin lispro (1 Unit Dial) 0-12 Units  0-12 Units Subcutaneous TID  Shakira Hoff MD   12 Units at 03/04/21 2286    insulin lispro (1 Unit Dial) 0-6 Units  0-6 Units Subcutaneous Nightly Shakira Hoff MD   2 Units at 03/03/21 2104    calcitRIOL (ROCALTROL) capsule 0.25 mcg  0.25 mcg Oral Daily Timur Johnson MD   0.25 mcg at 03/04/21 0921    glucose (GLUTOSE) 40 % oral gel 15 g  15 g Oral PRN Avani Rene MD        dextrose 50 % IV solution  12.5 g Intravenous PRN Avani Rene MD        glucagon (rDNA) injection 1 mg  1 mg Intramuscular PRN Avani Rene MD        dextrose 5 % solution  100 mL/hr Intravenous PRN Avani Rene MD        sodium chloride flush 0.9 % injection 10 mL  10 mL Intravenous 2 times per day Avani Rene MD   10 mL at 03/04/21 0920    sodium chloride flush 0.9 % injection 10 mL  10 mL Intravenous PRN Avani Rene MD        promethazine (PHENERGAN) tablet 12.5 mg  12.5 mg Oral Q6H PRN Avani Rene MD        Or    ondansetron (ZOFRAN) injection 4 mg  4 mg Intravenous Q6H PRN Avani Rene MD        polyethylene glycol (GLYCOLAX) packet 17 g  17 g Oral Daily PRN Avani Rene MD   17 g at 03/02/21 2244    aspirin EC tablet 81 mg  81 mg Oral Daily Avani Rene MD   81 mg at 03/04/21 0914    Or    aspirin suppository 300 mg  300 mg Rectal Daily Soledad Yu MD        labetalol (NORMODYNE;TRANDATE) injection 10 mg  10 mg Intravenous Q10 Min PRN Soledad Yu MD        heparin (porcine) injection 5,000 Units  5,000 Units Subcutaneous 3 times per day Soledad Yu MD   5,000 Units at 03/04/21 6105    ranolazine (RANEXA) extended release tablet 500 mg  500 mg Oral BID Soledad Yu MD   500 mg at 03/04/21 8188    clopidogrel (PLAVIX) tablet 75 mg  75 mg Oral Daily Soledad Yu MD   75 mg at 03/04/21 0202     Allergies   Allergen Reactions    Iodides Anaphylaxis    Shellfish-Derived Products Anaphylaxis    Atorvastatin Calcium Rash      reports that he has never smoked. He has never used smokeless tobacco. He reports current alcohol use. He reports that he does not use drugs. Objective:  Exam:   Constitutional:   Vitals:    03/04/21 0635 03/04/21 0645 03/04/21 0920 03/04/21 0930   BP: (!) 184/95 (!) 184/95 (!) 176/84 (!) 179/89   Pulse: 96 96 96 105   Resp: 20 18 18    Temp: 97.8 °F (36.6 °C) 97.8 °F (36.6 °C)     TempSrc: Oral Oral     SpO2:  95% 96%    Weight: 186 lb 15.2 oz (84.8 kg)      Height:         General appearance:  Normal development and appear in no acute distress. Mental Status:   Oriented to person, place, problem, and time. Memory: Good immediate recall. Intact remote memory  Normal attention span and concentration. Language: intact naming, repeating and fluency   Good fund of Knowledge. Cranial Nerves:   II: Visual fields: Full. Pupils: equal, round, reactive to light  III,IV,VI: Extra Ocular Movements are intact. No nystagmus  V: Facial sensation is intact  VII: Facial strength and movements: intact and symmetric  XII: Tongue movements are normal  Musculoskeletal: 5/5 in all 4 extremities. Coordination: no pronator drift, no dysmetria with FNF  Sensation: normal to all modalities in both arms and legs.   Gait/Posture: steady gait    No change in exam    Data:  LABS:   Lab Results   Component Value Date     03/02/2021    K 4.3 03/02/2021    K 4.6 03/01/2021    CL 96 03/02/2021    CO2 27 03/02/2021    BUN 47 03/02/2021    CREATININE 6.7 03/02/2021    GFRAA 10 03/02/2021    GFRAA 44 06/15/2013    LABGLOM 9 03/02/2021    GLUCOSE 356 03/02/2021    PHOS 3.5 03/02/2021    MG 2.20 03/02/2021    CALCIUM 8.9 03/02/2021     Lab Results   Component Value Date    WBC 4.4 03/02/2021    RBC 5.31 03/02/2021    HGB 15.1 03/02/2021    HCT 46.7 03/02/2021    MCV 87.9 03/02/2021    RDW 14.8 03/02/2021     03/02/2021     Lab Results   Component Value Date    INR 1.04 03/01/2021    PROTIME 12.1 03/01/2021       Neuroimaging was independently reviewed by me and discussed results with the patient  I reviewed blood testing and other test results and discussed results with the patient      Impression: No change  New onset left-sided weakness and paresthesia. Could be TIA. Now with right ICA stenosis up to 70% by carotid Doppler criteria. Symptomatic. Diabetes, not controlled  Hypertension, not controlled  End-stage renal disease  Hyperlipidemia  Cardiomyopathy. Recommendation    Follow-up with vascular regarding the need for CTA of the head and neck even with iodine allergy. He was premedicated with prednisone and Benadryl. For unclear reason, radiologist canceled the test.  Continue DAPT  Statin  Continue current blood pressure medications and blood pressure monitor  Insulin sliding scale  Blood sugar control  DVT and GI prophylaxis  DC planning after the above work-up  Discussed role prevention with the patient  We will follow as needed, please call for questions. Miah Davenport MD   [phone]      This dictation was generated by voice recognition computer software. Although all attempts are made to edit the dictation for accuracy, there may be errors in the transcription that are not intended.

## 2021-03-04 NOTE — PROGRESS NOTES
Physical Therapy  Facility/Department: [address]  Daily Treatment Note  NAME: Wendy Villarreal  : 1965  MRN: [id number]    Date of Service: 3/4/2021    Discharge Recommendations:Te Garay scored a 22/24 on the AM-PAC short mobility form. Current research shows that an AM-PAC score of 18 or greater is typically associated with a discharge to the patient's home setting. Based on the patient's AM-PAC score and their current functional mobility deficits, it is recommended that the patient have 2-3 sessions per week of Physical Therapy at d/c to increase the patient's independence. At this time, this patient demonstrates the endurance and safety to discharge home with OPPT and a follow up treatment frequency of 2-3x/wk. Please see assessment section for further patient specific details. If patient discharges prior to next session this note will serve as a discharge summary. Please see below for the latest assessment towards goals. Patient would benefit from continued therapy after discharge   PT Equipment Recommendations  Equipment Needed: No    Assessment   Body structures, Functions, Activity limitations: Decreased functional mobility ; Decreased strength;Decreased endurance;Decreased balance  Assessment: Pt supervision bed mob, SBA transfers and gait. At end of activity, pt wtih severe chest pain requiring urgent attention from nursing. Pt presents with subtle LLE weakness and fatigue noted throughout duration of evaluation. Pt requires CGA-SBA for OOB mobility without AD with cues for gluteal and core activation to improve posture and stability. Pt will continue to benefit from skilled therapy services to improve independence.   Treatment Diagnosis: left leg weakness  Prognosis: Good  Decision Making: Medium Complexity  History: carotid artery disease, chronic diastolic CHF, end-stage renal disease on peritoneal dialysis, history of CAD status post CABG, diabetes type 1, seizure disorder  Exam: MMT, transfers, gait training, stair training  Clinical Presentation: evolving  PT Education: Goals;PT Role;Plan of Care;Transfer Training;Gait Training;Functional Mobility Training  Patient Education: Pt verbalized understanding. REQUIRES PT FOLLOW UP: Yes  Activity Tolerance  Activity Tolerance: Treatment limited secondary to medical complications (free text)  Activity Tolerance: chest pain during treatment. Patient Diagnosis(es): The primary encounter diagnosis was Left leg weakness. Diagnoses of Hypoglycemia, End-stage renal disease on peritoneal dialysis ([facility] and Arterial ischemic stroke, ICA, right, acute ([facility] were also pertinent to this visit. has a past medical history of Angina at rest [facility]), Cardiomyopathy [facility]), Carotid artery stenosis, CH[facility]congestive [facility] failure) ([facility] CKD (chronic kidney disease) stage 2, GFR 60-89 ml/min, Coronary artery disease, Diabetic peripheral neuropathy ([facility] Diastolic HF (heart failure) ([facility] Hyperlipidemia with target LDL less than 70, Hypertension goal BP (blood pressure) < 130/80, PVD (peripheral vascular disease) ([facility] Seizures ([facility] and Type 1 diabetes mellitus with chronic kidney disease ([facility] has a past surgical history that includes Cardiac surgery; Coronary artery bypass graft; Cardiac catheterization; hernia repair; Tonsillectomy; and LAPAROSCOPY INSERTION PERITONEAL CATHETER (N/A, 6/3/2020). Restrictions  Restrictions/Precautions  Restrictions/Precautions: Fall Risk, Modified Diet  Required Braces or Orthoses?: No  Position Activity Restriction  Other position/activity restrictions: This is a pleasant 50 y.o. male with history carotid artery disease, chronic diastolic CHF, end-stage renal disease on peritoneal dialysis, history of CAD status post CABG, diabetes type 1, seizure disorder, who presents to the emergency room for evaluation of possible CVA.   Patient was last known well at 2:30 AM, woke up around 9:00 this morning with left leg numbness and weakness, slurred speech, lethargy. He also reports generalized weakness, diaphoresis blood glucose was noted to be 47 when checked by his girlfriend. He was administered some juice and on presentation to the emergency room, glucose has since improved. However, he reports persistent left leg weakness and numbness. Stroke alert was called, not a candidate for TPA, given onset of symptoms appears to be outside of window. Hospital medicine service consulted for admission for further evaluation. MRI ordered but has not yet been completed  Subjective   General  Chart Reviewed: Yes  Response To Previous Treatment: Patient with no complaints from previous session. Family / Caregiver Present: Yes(nursing at end of treatment.)  Subjective  Subjective: Pt denied pain at rest. During amb activity, pt developed severe chest pain and nursing called in urgently to address pt issues. General Comment  Comments: Pt seated EOB upon arrival.  Pain Screening  Patient Currently in Pain: Yes  Pain Assessment  Pain Assessment: 0-10  Pain Level: 8  Pain Type: Acute pain  Pain Location: Chest  Vital Signs  Patient Currently in Pain: Yes       Orientation  Orientation  Overall Orientation Status: Within Functional Limits  Cognition      Objective   Bed mobility  Sit to Supine: Supervision  Scooting: Modified independent  Comment: Pt seated up in chair upon entry. Transfers  Sit to Stand: Stand by assistance(from EOB and chair 2 times.)  Stand to sit: Stand by assistance  Ambulation  Ambulation?: Yes  Ambulation 1  Surface: level tile  Device: No Device  Assistance: Stand by assistance  Gait Deviations: None  Distance: Pt amb with no AD and SBA for 200 ft, then 100 ft. Comments: Pt amb to stairs and amb up/down 1 flight, then in hallway. Pt sat and had chest pain and SOB. After sitting 1-2 min, pt felt ok to continue amb. While walking back to room, pt developed chest pain again.  Nursing called urgently and took over care of pt. Assisted getting vitals and pt assisted back to bed for EKG. Stairs/Curb  Stairs?: Yes(Pt amb up/down 12 steps with SBA.)     Balance  Posture: Good  Sitting - Static: Good  Sitting - Dynamic: Good  Standing - Static: Good  Standing - Dynamic: Good;-  Exercises  Ankle Pumps: HR/TR x 20 bilat  Comments: No further ther ex done due to chest pain. Comment: vitals monitored during treatment, see vital signs.               G-Code     OutComes Score                                                     AM-PAC Score  AM-PAC Inpatient Mobility Raw Score : 22 (03/04/21 0954)  AM-PAC Inpatient T-Scale Score : 53.28 (03/04/21 0954)  Mobility Inpatient CMS 0-100% Score: 20.91 (03/04/21 0954)  Mobility Inpatient CMS G-Code Modifier : CJ (03/04/21 4441)          Goals  Short term goals  Time Frame for Short term goals: to be met by s/c (no goals met in full this date)  Short term goal 1: Pt will complete supine->sit transfer independently with HOB flat  Short term goal 2: Pt will complete STS transfer independently  Short term goal 3: Pt will ambulate x 200' indpendently  Short term goal 4: Pt will ascend/descend 10 stairs independently  Patient Goals   Patient goals : to go home    Plan    Plan  Times per week: 3-5x/week  Times per day: Daily  Current Treatment Recommendations: Strengthening, Balance Training, Functional Mobility Training, Transfer Training, Gait Training, Stair training, Equipment Evaluation, Education, & procurement, Patient/Caregiver Education & Training, Safety Education & Training, Neuromuscular Re-education, Home Exercise Program  Safety Devices  Type of devices: Call light within reach, Nurse notified, Gait belt, Left in bed  Restraints  Initially in place: No     Therapy Time   Individual Concurrent Group Co-treatment   Time In 0913         Time Out 0941         Minutes 28         Timed Code Treatment Minutes: [address] [facility], [id number]

## 2021-03-04 NOTE — PLAN OF CARE
Problem: Falls - Risk of:  Goal: Will remain free from falls  Outcome: Ongoing     Problem: Musculor/Skeletal Functional Status  Goal: Highest potential functional level  Outcome: Ongoing

## 2021-03-04 NOTE — FLOWSHEET NOTE
CCPD Order   Exchanges: 3   Exchange Volume: 2200 ml   Total Time: 9 hrs   Dextrose: 2.5%   Last Fill: 0 ml   Total Volume: 6600 ml     Orders verified. Supplies taken to pt's room. Report received. Cycler set up, primed and pre tested. Dressing changed on [facility] Cath site. Pt connected to cycler. CCPD initiated without problem. Initial effluent clear. 03/04/21 1827   Vitals   /67   Temp 97.7 °F (36.5 °C)   Temp Source Oral   Pulse 100   Resp 19   SpO2 100 %   Weight 190 lb 7.6 oz (86.4 kg)   Peritoneal Dialysis Catheter Intermittent Right lower abdomen   No Placement Date or Time found. Dialysis Type: Intermittent  Catheter Location: Right lower abdomen   Status Accessed   Site Condition No Complications   Dressing Status Clean;Dry; Intact   Dressing Occlusive   Cycler   Verification of Prescription CCPD   Total Volume Programmed 6600 mL   Therapy Time (Hours:Minutes) 9:00   Fill Volume 2200 mL   Last Fill Volume 0 mL   Dextrose Setting Same (Nonextraneal)   Number of Cycles 3   Bag Volume 5000 mL   Number of Bags Used 2   Dianeal Solution Other (Comment)  (Delflex 2.5% in 5000 m;l)   I Drain (mL) 3 mL

## 2021-03-04 NOTE — PROGRESS NOTES
Patient complained of having chest pain earlier but didn't want to take any nitro yet. Administered the nitro with his 0100 meds.

## 2021-03-04 NOTE — PLAN OF CARE
Problem: Falls - Risk of:  Goal: Will remain free from falls  Description: Will remain free from falls  Outcome: Ongoing  Goal: Absence of physical injury  Description: Absence of physical injury  Outcome: Ongoing     Problem: Musculor/Skeletal Functional Status  Goal: Highest potential functional level  Outcome: Ongoing  Goal: Absence of falls  Outcome: Ongoing     Problem: Pain:  Goal: Pain level will decrease  Description: Pain level will decrease  Outcome: Ongoing  Goal: Control of acute pain  Description: Control of acute pain  Outcome: Ongoing  Goal: Control of chronic pain  Description: Control of chronic pain  Outcome: Ongoing     VSS, PT planned for CTA per vascular surgery and is receiving allergy prep. PT accepting of plan for CTA tomorrow for analysis of carotid arteries. No complaints of pain and no nitro given during the shift.

## 2021-03-05 VITALS
HEART RATE: 87 BPM | SYSTOLIC BLOOD PRESSURE: 109 MMHG | OXYGEN SATURATION: 95 % | RESPIRATION RATE: 18 BRPM | WEIGHT: 187.39 LBS | BODY MASS INDEX: 28.4 KG/M2 | HEIGHT: 68 IN | DIASTOLIC BLOOD PRESSURE: 68 MMHG | TEMPERATURE: 98.1 F

## 2021-03-05 LAB
GLUCOSE BLD-MCNC: 149 MG/DL (ref 70–99)
GLUCOSE BLD-MCNC: 225 MG/DL (ref 70–99)
GLUCOSE BLD-MCNC: 257 MG/DL (ref 70–99)
GLUCOSE BLD-MCNC: 287 MG/DL (ref 70–99)
PERFORMED ON: ABNORMAL

## 2021-03-05 PROCEDURE — 6370000000 HC RX 637 (ALT 250 FOR IP): Performed by: INTERNAL MEDICINE

## 2021-03-05 PROCEDURE — 6360000002 HC RX W HCPCS: Performed by: INTERNAL MEDICINE

## 2021-03-05 PROCEDURE — 90686 IIV4 VACC NO PRSV 0.5 ML IM: CPT | Performed by: INTERNAL MEDICINE

## 2021-03-05 PROCEDURE — 2580000003 HC RX 258: Performed by: INTERNAL MEDICINE

## 2021-03-05 PROCEDURE — 97530 THERAPEUTIC ACTIVITIES: CPT

## 2021-03-05 PROCEDURE — 96372 THER/PROPH/DIAG INJ SC/IM: CPT

## 2021-03-05 PROCEDURE — G0008 ADMIN INFLUENZA VIRUS VAC: HCPCS | Performed by: INTERNAL MEDICINE

## 2021-03-05 PROCEDURE — 92526 ORAL FUNCTION THERAPY: CPT

## 2021-03-05 PROCEDURE — 99232 SBSQ HOSP IP/OBS MODERATE 35: CPT | Performed by: PSYCHIATRY & NEUROLOGY

## 2021-03-05 PROCEDURE — 6370000000 HC RX 637 (ALT 250 FOR IP): Performed by: NURSE PRACTITIONER

## 2021-03-05 RX ORDER — INSULIN LISPRO 100 [IU]/ML
3 INJECTION, SOLUTION INTRAVENOUS; SUBCUTANEOUS ONCE
Status: COMPLETED | OUTPATIENT
Start: 2021-03-05 | End: 2021-03-05

## 2021-03-05 RX ADMIN — HYDRALAZINE HYDROCHLORIDE 100 MG: 100 TABLET, FILM COATED ORAL at 05:18

## 2021-03-05 RX ADMIN — CLOPIDOGREL BISULFATE 75 MG: 75 TABLET ORAL at 08:45

## 2021-03-05 RX ADMIN — HEPARIN SODIUM 5000 UNITS: 5000 INJECTION INTRAVENOUS; SUBCUTANEOUS at 14:05

## 2021-03-05 RX ADMIN — FUROSEMIDE 80 MG: 40 TABLET ORAL at 08:45

## 2021-03-05 RX ADMIN — ASPIRIN 81 MG: 81 TABLET ORAL at 08:45

## 2021-03-05 RX ADMIN — CALCITRIOL 0.25 MCG: 0.25 CAPSULE ORAL at 08:45

## 2021-03-05 RX ADMIN — RANOLAZINE 500 MG: 500 TABLET, FILM COATED, EXTENDED RELEASE ORAL at 08:45

## 2021-03-05 RX ADMIN — Medication 10 ML: at 08:45

## 2021-03-05 RX ADMIN — INSULIN LISPRO 4 UNITS: 100 INJECTION, SOLUTION INTRAVENOUS; SUBCUTANEOUS at 14:34

## 2021-03-05 RX ADMIN — HEPARIN SODIUM 5000 UNITS: 5000 INJECTION INTRAVENOUS; SUBCUTANEOUS at 05:17

## 2021-03-05 RX ADMIN — NITROGLYCERIN 0.4 MG: 0.4 TABLET SUBLINGUAL at 05:18

## 2021-03-05 RX ADMIN — INSULIN LISPRO 3 UNITS: 100 INJECTION, SOLUTION INTRAVENOUS; SUBCUTANEOUS at 05:18

## 2021-03-05 RX ADMIN — ISOSORBIDE MONONITRATE 30 MG: 30 TABLET, EXTENDED RELEASE ORAL at 08:45

## 2021-03-05 RX ADMIN — INSULIN LISPRO 3 UNITS: 100 INJECTION, SOLUTION INTRAVENOUS; SUBCUTANEOUS at 13:04

## 2021-03-05 RX ADMIN — CARVEDILOL 25 MG: 25 TABLET, FILM COATED ORAL at 08:45

## 2021-03-05 RX ADMIN — INSULIN LISPRO 4 UNITS: 100 INJECTION, SOLUTION INTRAVENOUS; SUBCUTANEOUS at 08:43

## 2021-03-05 RX ADMIN — INFLUENZA A VIRUS A/VICTORIA/2454/2019 IVR-207 (H1N1) ANTIGEN (PROPIOLACTONE INACTIVATED), INFLUENZA A VIRUS A/HONG KONG/2671/2019 IVR-208 (H3N2) ANTIGEN (PROPIOLACTONE INACTIVATED), INFLUENZA B VIRUS B/VICTORIA/705/2018 BVR-11 ANTIGEN (PROPIOLACTONE INACTIVATED), INFLUENZA B VIRUS B/PHUKET/3073/2013 BVR-1B ANTIGEN (PROPIOLACTONE INACTIVATED) 0.5 ML: 15; 15; 15; 15 INJECTION, SUSPENSION INTRAMUSCULAR at 12:03

## 2021-03-05 RX ADMIN — AMLODIPINE BESYLATE 5 MG: 5 TABLET ORAL at 08:45

## 2021-03-05 RX ADMIN — HYDRALAZINE HYDROCHLORIDE 100 MG: 100 TABLET, FILM COATED ORAL at 13:28

## 2021-03-05 RX ADMIN — INSULIN LISPRO 4 UNITS: 100 INJECTION, SOLUTION INTRAVENOUS; SUBCUTANEOUS at 08:41

## 2021-03-05 ASSESSMENT — PAIN SCALES - GENERAL
PAINLEVEL_OUTOF10: 0

## 2021-03-05 NOTE — PROGRESS NOTES
VASCULAR    Will hold on CTA neck at this time given his history of anaphylaxis with IV dye. Plan for repeat carotid duplex scan in 6 months with follow up with Dr. Luis Felipe Mcnally at that time. If concerns for progression of disease would recommend formal angiogram and possible repeat percutaneous carotid intervention. Continue patient on ASA, Plavix and statin therapy. Okay to discharge from vascular standpoint. D/w Dr. Luis Felipe Mcnally. Patient is stable from vascular standpoint. We will sign off for now, but please do not hesitate to contact us with any questions. Thank you for the consultation.        Electronically signed by Melita Duane, APRN - CNP on 3/5/2021 at 8:36 AM

## 2021-03-05 NOTE — PROGRESS NOTES
Physical Therapy  Facility/Department: [address]  Daily Treatment Note  NAME: Albert Mcginnis  : 1965  MRN: [id number]    Date of Service: 3/5/2021    Discharge Recommendations: Albert Mcginnis scored a 21/24 on the AM-PAC short mobility form. Current research shows that an AM-PAC score of 18 or greater is typically associated with a discharge to the patient's home setting. Based on the patient's AM-PAC score and their current functional mobility deficits, it is recommended that the patient have 2-3 sessions per week of Physical Therapy at d/c to increase the patient's independence. At this time, this patient demonstrates the endurance and safety to discharge home with OP services and a follow up treatment frequency of 2-3x/wk. Please see assessment section for further patient specific details. If patient discharges prior to next session this note will serve as a discharge summary. Please see below for the latest assessment towards goals. PT Equipment Recommendations  Equipment Needed: No    Assessment   Body structures, Functions, Activity limitations: Decreased functional mobility ; Decreased strength;Decreased endurance;Decreased balance  Assessment: Pt able to perform bed mobility independent and transfer/ambulate with supervision. Pt able to stand independently in room during gait analysis and discussion of previous injury to LLE. Pt would benefit from continued skilled PT services to facilitate return towards independent mobility.   Treatment Diagnosis: left leg weakness  Prognosis: Good  Decision Making: Medium Complexity  History: carotid artery disease, chronic diastolic CHF, end-stage renal disease on peritoneal dialysis, history of CAD status post CABG, diabetes type 1, seizure disorder  Exam: MMT, transfers, gait training, stair training  Clinical Presentation: evolving  PT Education: Goals;PT Role;Plan of Care;Transfer Training;Gait Training;Functional Mobility Training;General Safety  Patient Education: d/c recommendations--Pt verbalized understanding. Barriers to Learning: none  REQUIRES PT FOLLOW UP: Yes  Activity Tolerance  Activity Tolerance: Patient Tolerated treatment well     Patient Diagnosis(es): The primary encounter diagnosis was Left leg weakness. Diagnoses of Hypoglycemia, End-stage renal disease on peritoneal dialysis ([facility] and Arterial ischemic stroke, ICA, right, acute ([facility] were also pertinent to this visit. has a past medical history of Angina at rest [facility]), Cardiomyopathy [facility]), Carotid artery stenosis, CHF (congestive heart failure) ([facility] CKD (chronic kidney disease) stage 2, GFR 60-89 ml/min, Coronary artery disease, Diabetic peripheral neuropathy ([facility] Diastolic HF (heart failure) ([facility] Hyperlipidemia with target LDL less than 70, Hypertension goal BP (blood pressure) < 130/80, PVD (peripheral vascular disease) ([facility] Seizures ([facility] and Type 1 diabetes mellitus with chronic kidney disease ([facility] has a past surgical history that includes Cardiac surgery; Coronary artery bypass graft; Cardiac catheterization; hernia repair; Tonsillectomy; and LAPAROSCOPY INSERTION PERITONEAL CATHETER (N/A, 6/3/2020). Restrictions  Restrictions/Precautions  Restrictions/Precautions: Fall Risk, Modified Diet  Required Braces or Orthoses?: No  Position Activity Restriction  Other position/activity restrictions: This is a pleasant 50 y.o. male with history carotid artery disease, chronic diastolic CHF, end-stage renal disease on peritoneal dialysis, history of CAD status post CABG, diabetes type 1, seizure disorder, who presents to the emergency room for evaluation of possible CVA. Patient was last known well at 2:30 AM, woke up around 9:00 this morning with left leg numbness and weakness, slurred speech, lethargy. He also reports generalized weakness, diaphoresis blood glucose was noted to be 47 when checked by his girlfriend.   He was administered some juice and on presentation to the emergency room, glucose has since improved. However, he reports persistent left leg weakness and numbness. Stroke alert was called, not a candidate for TPA, given onset of symptoms appears to be outside of window. Hospital medicine service consulted for admission for further evaluation. MRI ordered but has not yet been completed     Subjective   General  Chart Reviewed: Yes  Response To Previous Treatment: Patient with no complaints from previous session. Family / Caregiver Present: Yes(significant other)  Subjective  Subjective: Pt reporting no pain at this time. States he has been ambulating well and has OP PT and OT set up for this Monday. General Comment  Comments: Pt R sidelying in bed upon arrival.  Pain Screening  Patient Currently in Pain: Denies  Vital Signs  Patient Currently in Pain: Denies       Orientation  Orientation  Overall Orientation Status: Within Functional Limits     Objective   Bed mobility  Supine to Sit: Independent  Scooting: Independent  Transfers  Sit to Stand: Supervision  Stand to sit: Supervision  Ambulation  Ambulation?: Yes  Ambulation 1  Surface: level tile  Device: No Device  Assistance: Supervision  Quality of Gait: slightly widened Margot, appropriate edwina, pronation of LLE noted during stance phase on LLE  Distance: 20' in room  Comments: Pt reporting no need to ambulate in hallway and states he has been moving around well. Pt stood in room ~10 minutes discussing flat arch and previous foot injurty on LLE and how it has affected his ambulation.   Stairs/Curb  Stairs?: No     Balance  Posture: Good  Sitting - Static: Good  Sitting - Dynamic: Good  Standing - Static: Good  Standing - Dynamic: Good             G-Code     OutComes Score       AM-PAC Score  AM-PAC Inpatient Mobility Raw Score : 21 (03/05/21 1509)  AM-PAC Inpatient T-Scale Score : 50.25 (03/05/21 1509)  Mobility Inpatient CMS 0-100% Score: 28.97 (03/05/21 1509)  Mobility Inpatient CMS G-Code Modifier : Garry Kemp (03/05/21 1509)          Goals  Short term goals  Time Frame for Short term goals: to be met by s/c  Short term goal 1: Pt will complete supine->sit transfer independently with [facility] flat--MET 3/5/21  Short term goal 2: Pt will complete STS transfer independently  Short term goal 3: Pt will ambulate x 200' independently  Short term goal 4: Pt will ascend/descend 10 stairs independently  Patient Goals   Patient goals : to go home  1 GOAL MET [facility]  Times per week: 3-5x/week  Times per day: Daily  Current Treatment Recommendations: Strengthening, Balance Training, Functional Mobility Training, Transfer Training, Gait Training, Stair training, Equipment Evaluation, Education, & procurement, Patient/Caregiver Education & Training, Safety Education & Training, Neuromuscular Re-education, Home Exercise Program  Safety Devices  Type of devices: Call light within reach, Nurse notified, Gait belt, Left in bed(pt left seated at EOB)  Restraints  Initially in place: No     Therapy Time   Individual Concurrent Group Co-treatment   Time In 1246         Time Out 1302         Minutes 16               Timed Code Treatment Minutes:  16  Total Treatment Minutes:  [address], [address] [place], [address] [facility]

## 2021-03-05 NOTE — PROGRESS NOTES
Nephrology Progress Note  [phone]  [phone]   [email or web address]    Patient:  Albert Mcginnis   : 1965    Brief HPI    The patient is a 50 y.o.male with significant past medical history of ESRD on CCPD, Hypertension, DM I, Diabetic neuropathy,  PVD, Seizures, Hyperlipidemia, CAD/CABG/CHF diastolic, KELLY s/p SUZANNA stent presented with generalized weakness, left leg weakness, slurred speech, hypoglycemia. Reported blood glucose of 47 as checked at home.    Acute CVA ruled out, possibly TIA  We are consulted for ESRD    Subjective/Interval history    Pt seen and examined  Had CCPD overnight without issues  BPs better controlled  On room air  Working with speech therapy  Has been afebrile    Review of Systems   No chest pain or sob    SHx:   visitors at the bed-side    Meds:  Scheduled Meds:   insulin lispro  4 Units Subcutaneous TID WC    insulin lispro  0-18 Units Subcutaneous TID WC    insulin lispro  0-9 Units Subcutaneous Nightly    amLODIPine  5 mg Oral Daily    hydrALAZINE  100 mg Oral 3 times per day    carvedilol  25 mg Oral BID WC    isosorbide mononitrate  30 mg Oral Daily    furosemide  80 mg Oral Daily    rosuvastatin  40 mg Oral Nightly    insulin glargine  15 Units Subcutaneous Nightly    calcitRIOL  0.25 mcg Oral Daily    sodium chloride flush  10 mL Intravenous 2 times per day    aspirin  81 mg Oral Daily    Or    aspirin  300 mg Rectal Daily    heparin (porcine)  5,000 Units Subcutaneous 3 times per day    ranolazine  500 mg Oral BID    clopidogrel  75 mg Oral Daily     Continuous Infusions:   dextrose       PRN Meds:.acetaminophen, nitroGLYCERIN, glucose, dextrose, glucagon (rDNA), dextrose, sodium chloride flush, promethazine **OR** ondansetron, polyethylene glycol, labetalol      Vitals:  BP (!) 148/83 Comment: RN NOTIFIED  Pulse 91   Temp 98.1 °F (36.7 °C) (Oral)   Resp 16   Ht 5' 7.5\" (1.715 m)   Wt 187 lb 6.3 oz (85 kg)   SpO2 97%   BMI 28.92 kg/m²     Physical Exam     General : AAOx3, not in pain or respiratory distress, sitting up in bed  HEENT : mucosa moist.  CVS: S1 S2 normal, regular rhythm, no murmurs or rubs. Lungs: Clear, no wheezing or crackles. Abd: Soft, bowel sounds normal, non-tender. RLQ PD catheter+  Ext: No edema, no cyanosis  Skin: Warm. No rashes appreciated.       Labs:  CBC with Differential:    Lab Results   Component Value Date    WBC 8.2 03/04/2021    RBC 5.32 03/04/2021    HGB 14.9 03/04/2021    HCT 46.6 03/04/2021     03/04/2021    MCV 87.5 03/04/2021    MCH 27.9 03/04/2021    MCHC 31.9 03/04/2021    RDW 14.9 03/04/2021    SEGSPCT 85.2 06/15/2013    LYMPHOPCT 8.5 03/04/2021    MONOPCT 1.7 03/04/2021    EOSPCT 2.1 09/28/2010    BASOPCT 0.1 03/04/2021    MONOSABS 0.1 03/04/2021    LYMPHSABS 0.7 03/04/2021    EOSABS 0.0 03/04/2021    BASOSABS 0.0 03/04/2021    DIFFTYPE Scan-K 06/15/2013     BMP:    Lab Results   Component Value Date     03/04/2021    K 4.9 03/04/2021    CL 94 03/04/2021    CO2 24 03/04/2021    BUN 57 03/04/2021    LABALBU 4.1 03/02/2021    CREATININE 6.9 03/04/2021    CALCIUM 9.5 03/04/2021    GFRAA 10 03/04/2021    GFRAA 44 06/15/2013    LABGLOM 8 03/04/2021    GLUCOSE 439 03/04/2021     Ionized Calcium:  No results found for: IONCA  Magnesium:    Lab Results   Component Value Date    MG 2.20 03/02/2021     Phosphorus:    Lab Results   Component Value Date    PHOS 3.5 03/02/2021     U/A:    Lab Results   Component Value Date    COLORU YELLOW 05/01/2020    PROTEINU 100 05/01/2020    PHUR 6.0 05/01/2020    PHUR 6.0 05/01/2020    WBCUA 0 05/01/2020    RBCUA 29 05/01/2020    MUCUS 1+ 05/25/2010    BACTERIA Rare 05/25/2010    CLARITYU Clear 05/01/2020    SPECGRAV 1.012 05/01/2020    LEUKOCYTESUR Negative 05/01/2020    UROBILINOGEN 1.0 05/01/2020    BILIRUBINUR Negative 05/01/2020    BILIRUBINUR NEGATIVE 05/25/2010    BLOODU SMALL 05/01/2020    GLUCOSEU 250 05/01/2020    GLUCOSEU >=1000 05/25/2010 Assessment/Plan:    1. ESRD on CCPD  On CCPD with FV of 2200 ml, 3 exchanges. TV of 6600 ml   Noted notes from dialysis RN  New EDW 85 kg. Continue current regimen. 2. Left hemiparesis and paresthesias  ? TIA  CT/MRI with no evidence of acute CVA  Carotid US with right ICA stenosis. CTA neck planned but on hold for now with h/o anaphylaxis to dye. Plan for repeat carotid duplex in 6 months and follow-up with Dr. Roland Zheng. On dual antiplatelets and statin. Neuro on board    3. Acute Encephalopathy improved  Secondary to hypoglycemia     4. Hyponatremia mild  continue fluid restriction    5. CKD-MBD  Continue calcitriol    6. Hypertension controlled  Resumed on home meds    7. Chest pain  Cards consulted    Bladimir Diaz MD.  3/5/2021  Office Phone : [phone]    Thank you for allowing us to participate in the care of this pt. I willcontinue to follow along. Please call with questions or concerns.

## 2021-03-05 NOTE — PROGRESS NOTES
Andrea Quiroz  Neurology Follow-up  [facility]    Date of Service: 3/5/2021    Subjective:   CC: Follow up today regarding: New onset left-sided paresthesia and weakness. Events noted. Chart and lab reviewed. He feels back to his baseline. No residual weakness or numbness. No chest pain, headache, dysphagia or dysarthria. Seen by vascular and plan for follow-up outpatient with another Doppler since he has significant allergy toward IV iodine. Other review of system was unremarkable. ROS : A 10-12 system review obtained and updated today and is unremarkable except as mentioned  in my interval history. family history includes Arthritis in his brother; Coronary Art Dis in his father and paternal grandmother; Diabetes in his father and paternal grandmother; Hypertension in his mother; Mult Sclerosis in his brother; Other in his mother and sister; Sleep Apnea in his brother.     Past Medical History:   Diagnosis Date    Angina at rest ([address]     Cardiomy[facility] ([facility]     Carotid artery stenosis 10/25/2016    SUZANNA stented with 8 x 30 mm non-tapered Xact stent    CHF (congestive heart failure) ([address] 3/3/15    EF 30%     CKD (chronic kidney disease) stage 2, GFR 60-89 ml/min     Coronary artery disease     sp CABG UC    Diabetic peripheral neuropathy (HCC)     Diastolic HF (heart failure) (HCC)     Hyperlipidemia with target LDL less than 70     Hypertension goal BP (blood pressure) < 130/80     PVD (peripheral vascular disease) (HCC)     Seizures (HCC)     Type 1 diabetes mellitus with chronic kidney disease (HCC)     c-peptide <0.1 in 2015     Current Facility-Administered Medications   Medication Dose Route Frequency Provider Last Rate Last Admin    influenza quadrivalent split vaccine (FLUZONE;FLUARIX;FLULAVAL;AFLURIA) injection 0.5 mL  0.5 mL Intramuscular Prior to discharge Chandrika Johnson MD        insulin lispro (1 Unit Dial) 4 Units  4 Units Subcutaneous TID  Kyle Pacheco MD   4 Units at 03/05/21 0843    insulin lispro (1 Unit Dial) 0-18 Units  0-18 Units Subcutaneous TID  Kyle Pacheco MD   4 Units at 03/05/21 0841    insulin lispro (1 Unit Dial) 0-9 Units  0-9 Units Subcutaneous Nightly Kyle Pacheco MD   5 Units at 03/04/21 2146    amLODIPine (NORVASC) tablet 5 mg  5 mg Oral Daily Renee Gold MD   5 mg at 03/05/21 0845    hydrALAZINE (APRESOLINE) tablet 100 mg  100 mg Oral 3 times per day Renee Gold MD   100 mg at 03/05/21 0518    acetaminophen (TYLENOL) tablet 650 mg  650 mg Oral Q4H PRN Kena Miniamira APRN - CNP        nitroGLYCERIN (NITROSTAT) SL tablet 0.4 mg  0.4 mg Sublingual Q5 Min PRN JAYLEN Ureña - CNP   0.4 mg at 03/05/21 0518    carvedilol (COREG) tablet 25 mg  25 mg Oral BID  Renee Gold MD   25 mg at 03/05/21 0845    isosorbide mononitrate (IMDUR) extended release tablet 30 mg  30 mg Oral Daily Renee Gold MD   30 mg at 03/05/21 0845    furosemide (LASIX) tablet 80 mg  80 mg Oral Daily Renee Gold MD   80 mg at 03/05/21 0845    rosuvastatin (CRESTOR) tablet 40 mg  40 mg Oral Nightly Shiloh Garcia MD   40 mg at 03/04/21 2148    insulin glargine (LANTUS;BASAGLAR) injection pen 15 Units  15 Units Subcutaneous Nightly Kyle Pacheco MD   15 Units at 03/04/21 2145    calcitRIOL (ROCALTROL) capsule 0.25 mcg  0.25 mcg Oral Daily Renee Gold MD   0.25 mcg at 03/05/21 0845    glucose (GLUTOSE) 40 % oral gel 15 g  15 g Oral PRN Shiloh Garcia MD        dextrose 50 % IV solution  12.5 g Intravenous PRN Shiloh Garcia MD        glucagon (rDNA) injection 1 mg  1 mg Intramuscular PRN Shiloh Garcia MD        dextrose 5 % solution  100 mL/hr Intravenous PRN Shiloh Garcia MD        sodium chloride flush 0.9 % injection 10 mL  10 mL Intravenous 2 times per day Shiloh Garcia MD   10 mL at 03/05/21 0845    sodium chloride flush 0.9 % injection 10 mL  10 mL Intravenous PRN Soledad Yu MD        promethazine (PHENERGAN) tablet 12.5 mg  12.5 mg Oral Q6H PRN Soledad Yu MD        Or    ondansetron (ZOFRAN) injection 4 mg  4 mg Intravenous Q6H PRN Soledad Yu MD        polyethylene glycol (GLYCOLAX) packet 17 g  17 g Oral Daily PRN Soledad Yu MD   17 g at 03/02/21 2244    aspirin EC tablet 81 mg  81 mg Oral Daily Soledad Yu MD   81 mg at 03/05/21 0845    Or    aspirin suppository 300 mg  300 mg Rectal Daily Soledad Yu MD        labetalol (NORMODYNE;TRANDATE) injection 10 mg  10 mg Intravenous Q10 Min PRN Soledad Yu MD        heparin (porcine) injection 5,000 Units  5,000 Units Subcutaneous 3 times per day Soledad Yu MD   5,000 Units at 03/05/21 0517    ranolazine (RANEXA) extended release tablet 500 mg  500 mg Oral BID Soledad Yu MD   500 mg at 03/05/21 0845    clopidogrel (PLAVIX) tablet 75 mg  75 mg Oral Daily Soledad Yu MD   75 mg at 03/05/21 0845     Allergies   Allergen Reactions    Iodides Anaphylaxis    Shellfish-Derived Products Anaphylaxis    Atorvastatin Calcium Rash      reports that he has never smoked. He has never used smokeless tobacco. He reports current alcohol use. He reports that he does not use drugs. Objective:  Exam:   Constitutional:   Vitals:    03/05/21 0015 03/05/21 0415 03/05/21 0435 03/05/21 0807   BP: 132/77 (!) 159/83 (!) 159/82 (!) 148/83   Pulse: 84 84 86 91   Resp: 17 16 18 16   Temp: 98 °F (36.7 °C) 97.7 °F (36.5 °C) 97.7 °F (36.5 °C) 98.1 °F (36.7 °C)   TempSrc: Oral Oral Oral Oral   SpO2: 96% 98% 99% 97%   Weight:   187 lb 6.3 oz (85 kg)    Height:         General appearance:  Normal development and appear in no acute distress. Mental Status:   Oriented to person, place, problem, and time. Memory: Good immediate recall. Intact remote memory  Normal attention span and concentration. Language: intact naming, repeating and fluency   Good fund of Knowledge. off        Felicia Winters MD   [phone]      This dictation was generated by voice recognition computer software. Although all attempts are made to edit the dictation for accuracy, there may be errors in the transcription that are not intended.

## 2021-03-05 NOTE — PROGRESS NOTES
Speech Language Pathology  Dysphagia Treatment Note    Name:  Jaylen Atkins  :   1965  Medical Diagnosis:  Left leg weakness [R29.898]  Treatment Diagnosis: Oropharyngeal Dysphagia  Pain level: Pt denies pain    Current Diet Level: Dysphagia III Soft and Bite-Sized with thin liquids/no straws/meds with applesauce, pending MBS results  Tolerance of Current Diet Level: RN and pt report no difficulty with current diet. Assessment of Texture Tolerance:  -Impressions: Pt in bed upon SLP entry; sat to edge of bed for session. Reported improvement in L side, almost back to normal. He was alert and cooperative and able to provide detailed history regarding current hospital stay. Pt indicated Dysphagia III Soft and Bite-Sized is something he has to get \"used to. \" He accepted snack of regular texture juan crackers and thin liquids via cup. Recalled recommendation from seeing SLP on 3/3/21 not to use straws. Pt reported having various issues with teeth/gums and having planned dental work. He indicated he is able to eat most foods at home but has to be careful where he chews to avoid pain (chews mostly on L side). Pt demonstrated mildly prolonged, but adequate/functional, mastication of regular solids. He did demonstrated slowed mastication, which he reports is to avoid pain in teeth/gums. Thin liquids revealed grossly adequate bolus containment. Question mild delay in swallow initiation; however, apparent adequate laryngeal elevation felt upon palpation. No overt s/s aspiration. No coughing or throat clears; vocal quality remained clear. At this time, recommend upgrade to regular texture diet/thin liquids. Discussed pt choosing softer menu items (here at hospital and when d/c); pt is cognitively able to do so and appears to have been doing this already due to dental issues. Reviewed swallow strategies/precautions; pt v/u. At this time, completion of modified barium swallow does not appear warranted.  Educated pt that should concerns of aspiration arise, he can communicate with PCP for speech therapy or completion of MBS if indicated. Pt v/u.     -Compensatory strategies: 90 degree positioning with all p.o. intake; small bites/sips; alternate textures through meal; reduce rate of intake; No straws    Diet and Treatment Recommendations:  Upgrade to regular texture diet/thin liquids; meds as tolerated    ST.) Pt will tolerate recommended diet without s/s of aspiration. (ongoing, 3/5/2021)  2.)Pt will tolerate MBS to r/o aspiration and determine appropriate diet/liquid level with further diet and treatment recommendations as indicated. (ongoing, 3/5/2021)  3.) Pt will tolerate diet advance to least restricted diet, as clinically indicated, with no signs of aspiration. (ongoing, 3/5/2021)    Plan: Continued daily Dysphagia treatment with goals per plan of care. Patient/Family Education: Education given to the Pt and nurse, who verbalized understanding    Discharge Recommendations:  Pt will benefit from 1-2 follow-ups for dysphagia to ensure diet tolerance while in hospital; hwoever, pt likely will not require Speech Therapy for Dysphagia services when returning home. Timed Code Treatment: 0 minutes    Total Treatment Time: 15 minutes    If patient discharges prior to next session this note will serve as a discharge summary. Signature:   Walter Man M.S. Karli Hurst  Speech-Language Pathologist

## 2021-03-05 NOTE — PROGRESS NOTES
Patient asked if he can have nitro because his chest was starting to hurt and he didn't want to wait until it got too bad. CARMELA.      Ashley Franco

## 2021-03-05 NOTE — FLOWSHEET NOTE
Disconnected from CCPD per protocol. Effluent: clear yellow, no fibrin noted. Total time: 9 hr 7 min   Total UF:  898 ml. Total Volume:  6617 ml. Dwell time gained:  0 hr 2 min. Pt Tolerated procedure: without difficulty. Report given to: Neyda Conti RN  Last BM: 3/4/21       03/05/21 0435   Vitals   BP (!) 159/82   Temp 97.7 °F (36.5 °C)   Temp Source Oral   Pulse 86   Resp 18   SpO2 99 %   Weight 187 lb 6.3 oz (85 kg)   Peritoneal Dialysis Catheter Intermittent Right lower abdomen   No Placement Date or Time found. Dialysis Type: Intermittent  Catheter Location: Right lower abdomen   Status Deaccessed   Site Condition No Complications   Dressing Status Clean;Dry; Intact   Dressing Occlusive   Cycler   Ultrafiltration (UF) (mL) 898 mL

## 2021-03-05 NOTE — PROGRESS NOTES
Patient is sitting up in bed at this time, he has no C/O chest pain. He is asking to be discharged nurse will message MD to inquire. He states he feels better and has no weakness any longer.

## 2021-03-05 NOTE — PROGRESS NOTES
Patient VSS. Assessed patient and obtained blood glucose (252). Patient denies any pain at the moment and has no further needs at the moment. Call light within reach.     Ashley Strickland

## 2021-03-05 NOTE — PROGRESS NOTES
Patient d/cd to home today. All discharge instructions given to the patient and spouse and verbalized understanding. Answered al questions.

## 2021-03-05 NOTE — PROGRESS NOTES
the patient seen for diabetic education follow up. POC blood sugar 149 before lunch. the patient is very pleased with blood sugar trend since prandial doses have been added yesterday. All questions answered. Romayne Saltness RN, BSN, PCCN.

## 2021-03-05 NOTE — PROGRESS NOTES
Pt's blood glucose 257. Pt asking for 3-4 units of humalog. Message sent to NP to please call when available. Spoke with NP explained issue. Orders entered. Will continue to monitor.

## 2021-03-08 ENCOUNTER — APPOINTMENT (OUTPATIENT)
Dept: OCCUPATIONAL THERAPY | Age: 56
End: 2021-03-08
Payer: COMMERCIAL

## 2021-03-08 ENCOUNTER — HOSPITAL ENCOUNTER (OUTPATIENT)
Dept: PHYSICAL THERAPY | Age: 56
Setting detail: THERAPIES SERIES
Discharge: HOME | End: 2021-03-08
Payer: COMMERCIAL

## 2021-03-08 PROCEDURE — 97530 THERAPEUTIC ACTIVITIES: CPT

## 2021-03-08 PROCEDURE — 97161 PT EVAL LOW COMPLEX 20 MIN: CPT

## 2021-03-08 NOTE — PLAN OF CARE
Rasheeda Steele [address], [address]  Phone: [phone]   Fax: [phone]     Physical Therapy Certification    Dear Referring Practitioner: Aureliano Cooper MD,    We had the pleasure of evaluating the following patient for physical therapy services at [address] [place] [address]. A summary of our findings can be found in the initial assessment below. This includes our plan of care. If you have any questions or concerns regarding these findings, please do not hesitate to contact me at the office phone number checked above. Thank you for the referral.       Physician Signature:_______________________________Date:__________________  By signing above (or electronic signature), therapists plan is approved by physician      Patient: Lit Louis   : 1965   MRN: [id number]  Referring Physician: Referring Practitioner: Aureliano Cooper MD      Evaluation Date: 3/8/2021      Medical Diagnosis Information:  Diagnosis: C49.710 (ICD-10-CM) - Arterial ischemic stroke, ICA, right, acute ([facility] [address]   Treatment Diagnosis: Decreased core stability & LLE strength, Impaired B hamstring length                                         Insurance information: PT Insurance Information: [employer]     Precautions/ Contra-indications: allergies  Latex Allergy:  [x]NO      []YES  Preferred Language for Healthcare:   [x]English       []other:    Relevant Medical History:   CHF, CAD, Diabetic neuropathy, HTN, stage 2 CKD, PVD, seizures    C-SSRS Triggered by Intake questionnaire (Past 2 wk assessment ):   [x] No, Questionnaire did not trigger screening.   [] Yes, Patient intake triggered C-SSRS Screening     [] Completed, no further action required. [] Completed, PCP notified via Epic    SUBJECTIVE:   Pt states waking Monday am and was unable to move LLE, had N&T in LUE and LLE.   Attempted to stand but couldn't, so laid back down and went to sleep. Upon waking from girlfriend, was confused, blood sugar was low, unable to identify girlfriend. Decided to go to hospital, had trouble walking, LLE improved but still tingling and had to drag LLE, almost fell while getting out of car. Admitted to hospital, symptoms slowly improved, about 5 days later, felt he improved about 90%. Returned home Friday, 3/5, felt better Saturday & again on Sunday. Has been limiting activity since being back home, feels energy levels have improved and feels normal.  Does still have \"dead spots\" in LLE but feels it's probably related to diabetic neuropathy. Is walking good but has trouble getting started. Primary complaint is has to think through his tasks initially until he gets going and then is more easily able to complete tasks.      Pain Scale: 0/10  Easing factors:   Provocative factors:   Type: []Constant   []Intermittent  []Radiating []Localized []other:  Numbness/Tingling: LLE & L shoulder & hand    Occupation/School/Hobbies: works from home, not currently working    Living Status/Prior Level of Function: Prior to this injury / incident, pt was independent with ADLs and IADLs, able to complete all tasks without difficulty    Domicile:  []Single level house  [x]Multi-level house []Trailer/Mobile home        []Condo/Town home  []Apartment   []Other:          [x]Steps to enter home: 6   w/ [x]HR []No HR; []Level []Ramped entry/exit ; []Elevator access    Assistance in home:   []None  [x]Girlfriend & kids  []Family/Friend/Neighbor outside of home    Available DME:  []SPC     []QC []RW     []4WW []Magno-walker       []Manual w/c []Powered w/c []Transport chair     []Shower bench []Transfer-tub bench    []Grab bars       []BSC  []RTS []Versa-frame              []Lift-chair  []Bed rail   []Hospital Bed  []Alert Button  [x]None       OBJECTIVE:   Functional Outcome: Tinetti Assessment:  28/28    TUG:  WNL      Posture:   Decreased lumbar lordosis    Functional Mobility: Bed mobility: IND   Transfers: IND   Gait: decreased WB'ing through LLE w/Trendelenburg sign, hip hiking to initiate swing phase   Balance:  Poor bilaterally      Dermatomes Normal Abnormal Comments   Top of head (C1)      Posterior occipital region (C2)      Side of neck (C3)      Top of shoulder (C4)      Lateral deltoid (C5)      Tip of thumb (C6)      Distal middle finger (C7)      Distal fifth finger (C8)      Medial forearm (T1)            inguinal area (L1)       anterior mid-thigh (L2)      distal ant thigh/med knee (L3)      medial lower leg and foot (L4)      lateral lower leg and foot (L5)      posterior calf (S1)      Medial calcaneus (S2)          AROM LEFT RIGHT Comments   Hamstring 90/90 Lacking 45 deg Lacking 35 deg    Ankle Dorsiflexion  WFL 2 deg    Ankle Plantarflexion           MMT LEFT RIGHT Comments   Neck flexion (C1-C2)      Neck sidebending (C3)      Shoulder flexion/elevation (C4)      Shoulder abduction (C5)      Shoulder ER      Shoulder IR      Elbow flexion/wrist extension (C6)      Elbow extension/wrist flexion (C7)      Thumb abduction (C8)      Finger abduction (T1)            Hip flexion (L1-L2) 4 5    Hip abduction 4- 4    Hip extension 3+ 3+    Knee extension (L2-L4) 4+ 5    Knee flexion 4+ 5    Ankle Dorsiflexion (L4-L5) 4 4-    Ankle Plantar flexion (S1-S2)      Ankle Eversion (S1-S2)      Great Toe Ext (L5)            Core Strength    3/10       Balance/Special Tests: Result N/A Comments   SLS 6\" R, 6\" L  -significantly unsteady LLE   Feet Together      Romberg         Tandem Stance      QOLIBRI      TUG Score      10-meter Walk      Modified Travno      Mini-BESTest      Dynamic Gait Index      Tinetti Assessment      VALDEZ Balance Assessment          Cognitive Function:   [x]Normal []Impulsive []Flat-affect []Other:      Visual Deficit:  [x]None []Hemianopsia []Homonymous []Blindness []Other:     Speech:  [x]Normal []Dysphasic  []Aphasic []Dysarthria []Low-volume    Preferred method of feedback:   [x]Immediate  []After task      Quality of Movement:     Coordination:  Rapid Alternation Movement:    Finger to Nose:   Heel to NIX [facility]      [x]Normal     [x]Normal    [x]Normal     []Dysdiadokinesia   []Dysmetric   []Ataxic              []Abnormal    Tone:   RUE:  [x]Normotonic  []Hypertonic []Hypotonic []Hyperreflexive []Hyporeflexive []Clonus  LUE:  [x]Normotonic  []Hypertonic []Hypotonic []Hyperreflexive []Hyporeflexive []Clonus  RLE:  [x]Normotonic  []Hypertonic []Hypotonic []Hyperreflexive []Hyporeflexive []Clonus  LLE:   [x]Normotonic  []Hypertonic []Hypotonic []Hyperreflexive []Hyporeflexive []Clonus    []R []L UE Synergy: []Flexion  []Extensor  [x]Other: none   []R []L LE Synergy: []Flexion  []Extensor  [x]Other:  none      [x] Patient history, allergies, meds reviewed. Medical chart reviewed. See intake form. Review Of Systems (ROS):  [x]Performed Review of systems (Integumentary, CardioPulmonary, Neurological) by intake and observation. Intake form has been scanned into medical record. Patient has been instructed to contact their primary care physician regarding ROS issues if not already being addressed at this time.       Co-morbidities/Complexities (which will affect course of rehabilitation):    []None        []Hx of COVID   Arthritic conditions   []Rheumatoid arthritis (M05.9)  []Osteoarthritis (M19.91)  []Gout   Cardiovascular conditions   [x]Hypertension (I10)  [x]Hyperlipidemia (E78.5)  []Angina pectoris (I20)  []Atherosclerosis (I70)  []Pacemaker  [x]Hx of CABG/stent/  cardiac surgeries   Musculoskeletal conditions   []Disc pathology   []Congenital spine pathologies   []Osteoporosis (M81.8)  []Osteopenia (M85.8)  []Scoliosis       Endocrine conditions   []Hypothyroid (E03.9)  []Hyperthyroid Gastrointestinal conditions   []Constipation (S97.95)   Metabolic conditions   []Morbid obesity (E66.01)  [x]Diabetes type 1(E10.65) or 2 (E11.65)   [x]Neuropathy (G60.9)     Cardio/Pulmonary conditions   []Asthma (J45)  []Coughing   []COPD (J44.9)  [x]CHF  []A-fib   Psychological Disorders  []Anxiety (F41.9)  []Depression (F32.9)   []Other:   Developmental Disorders  []Autism (F84.0)  []CP (G80)  []Down Syndrome (Q90.9)  []Developmental delay     Neurological conditions  []Prior Stroke (I69.30)  []Parkinson's (G20)  []Encephalopathy (G93.40)  []MS (G35)  []Post-polio (G14)  []SCI  []TBI  []ALS Other conditions  []Fibromyalgia (M79.7)  []Vertigo  []Syncope  [x]Kidney Disease  []Cancer      []currently undergoing                treatment  []Pregnancy  []Incontinence   Prior surgeries  []involved limb  []previous spinal surgery  [] section birth  []hysterectomy  []bowel / bladder surgery  []other relevant surgeries   []Other:                Barriers to/and or personal factors that will affect rehab potential:              [x]Age  []Sex    []Smoker              []Motivation/Lack of Motivation                        [x]Co-Morbidities              []Cognitive Function, education/learning barriers              []Environmental, home barriers              []profession/work barriers  []past PT/medical experience  []other:    Falls Risk Assessment (30 days):   [x] Falls Risk assessed and no intervention required. [] Falls Risk assessed and Patient requires intervention due to being higher risk   TUG score (>12s at risk):     [] Falls education provided, including       ASSESSMENT:  Patient is a 51 yo male, with several co-morbidities, who complains of LLE & LUE weakness and difficulty initiating gait & activities s/p suspected CVA 3/1/21. Patient presents w/decreased core & LLE strength and impaired balance w/gait deviations inhibiting return to PLOF. Patient can benefit from PT services to improve deficits and expedite return to PLOF.          Functional Impairments:     []Noted scapular/hip hypomobility   []Noted scapular/hip hypermobility   []Assistance required with functional mobility/gait for safety   [x]Decreased core/proximal hip strength and neuromuscular control    [x]Decreased UE/LE functional strength    []Abnormal reflexes/sensation/myotomal/dermatomal deficits  []Hypertonic UE/LE   []Hypotonic UE/LE  []Dislocated//Hypermobile Glenohumeral joint  [x]Impaired balance/coordination/proprioceptive control    []other:      Functional Activity Limitations (from functional questionnaire and intake)   []Reduced ability to tolerate prolonged functional positions   []Reduced ability or difficulty with changes of positions or transfers between positions   [x]Reduced ability to maintain good posture and demonstrate good body mechanics with sitting, bending, and lifting   []Reduced ability to sleep   [] Reduced ability or tolerance with driving and/or computer work   []Reduced ability to perform lifting, reaching, carrying tasks   []Reduced ability to squat   []Reduced ability to forward bend   [x]Reduced ability to ambulate prolonged functional periods/distances/surfaces   []Reduced ability to ascend/descend stairs   []other:       Participation Restrictions   []Reduced participation in self care activities   []Reduced participation in home management activities   [x]Reduced participation in work activities   [x]Reduced participation in social activities. []Reduced participation in sport/recreational activities.     Classification:   []Signs/symptoms consistent with Primary prevention/risk reduction for loss of balance and falls    []Signs/symptoms consistent with Impaired neuromotor development   [x]Signs/symptoms consistent with Impaired motor function and sensory integrity associated w/non-progressive disorders of CNS - Congenital/Aquired in Infancy/Childhood or Acquired in Adolescence/Adulthood   []Signs/symptoms consistent with Impaired motor function and sensory integrity associate w/progressive disorders of the including: strength training, ROM, for LE, Glutes and core   [x]  NMR activation and proprioception for shoulder complex, glutes, UE/LE, and Core   [x]  Manual therapy as indicated for Shoulder & Hip complex, LE and core activation to include: STM, manual cues to facilitate/inhibit muscle groups. [x]  Modalities as needed that may include: thermal agents, E-stim, Biofeedback, US as indicated  [x]  Patient education on joint protection, postural re-education, home/activity modification, progression of HEP. HEP instruction: Written HEP instructions provided and reviewed. Access Code: [email or web address]  URL: [email or web address]  Date: 03/08/2021  Prepared by: El Johns    Exercises  Seated Table Hamstring Stretch - 3 sets - 30 hold - 3x daily - 7x weekly  Supine 90/90 Abdominal Bracing - 10 reps - 3x daily - 7x weekly  Bridge - 10 reps - 3x daily - 7x weekly  Prone Hip Extension - 10 reps - 2x daily - 7x weekly  Single Leg Stance - 3 sets - 15 sec hold - 3x daily - 7x weekly    GOALS:  Patient stated goal: be able to walk & move without thinking about it  [] Progressing: [] Met: [] Not Met: [] Adjusted    Therapist goals for Patient:   Short Term Goals: To be achieved in: 2 weeks  1. Independent in HEP and progression per patient tolerance, in order to prevent re-injury. [] Progressing: [] Met: [] Not Met: [] Adjusted  2. Patient will have a decrease in pain to facilitate improvement in movement, function, and ADLs as indicated by Functional Deficits. [] Progressing: [] Met: [] Not Met: [] Adjusted    Long Term Goals: To be achieved in: 4 weeks  1. Patient to demonstrate VALDEZ score 54/56 to demonstrate improved balance to assist with reaching prior level of function. [] Progressing: [] Met: [] Not Met: [] Adjusted  2. Patient will demonstrate increased L ankle DF AROM to 6 deg to allow for proper joint functioning as indicated by patients Functional Deficits.    [] Progressing: [] Met: [] Not Met: [] Adjusted  3. Patient will demonstrate an increase in LLE strength to at least 4+/5 throughout to allow for proper functional mobility as indicated by patients Functional Deficits. [] Progressing: [] Met: [] Not Met: [] Adjusted  4. Patient to improve core stability to 7/10 in order to improve initiation of gait and dynamic tasks. [] Progressing: [] Met: [] Not Met: [] Adjusted  5. Patient to improve B hamstring length 90/90 position to lacking 30 deg bilaterally in order to improve lumbar posture with all mobility.   [] Progressing: [] Met: [] Not Met: [] Adjusted     Electronically signed by:  Bry Mcrae, PT

## 2021-03-09 NOTE — FLOWSHEET NOTE
[facility] Physical Therapy  Phone: [phone]   Fax: [phone]    Physical Therapy Daily Treatment Note  Date:  3/9/2021    Patient Name:  Edythe Soulier    :  1965  MRN: [id number]  Medical/Treatment Diagnosis Information:  · Diagnosis: I63.231 (ICD-10-CM) - Arterial ischemic stroke, ICA, right, acute (HCC)  · Treatment Diagnosis: Decreased core stability & LLE strength, Impaired B hamstring length  Insurance/Certification information:  PT Insurance Information: Aetna  Physician Information:  Referring Practitioner: Raejean Aschoff, MD  Plan of care signed (Y/N): []  Yes [x]  No     Date of Patient follow up with Physician:      Progress Report: []  Yes  [x]  No     Date Range for reporting period:  Beginning  3/8/2021   Ending      Progress report due (10 Rx/or 30 days whichever is less): visit #8 or      Recertification due (POC duration/ or 90 days whichever is less): visit #8/     Visit # Insurance Allowable Auth required?  Date Range    [place] []  Yes  [x]  No pcy     Latex Allergy:  [x]NO      []YES  Preferred Language for Healthcare:   [x]English       []Other:      Functional Scale/Test Evaluation  30 day  60 day  Discharge    Tinetti Assessment        TUG WNL                   Pain level:  0/10  Location:  na    SUBJECTIVE:  See eval    OBJECTIVE: See eval      RESTRICTIONS/PRECAUTIONS:  CHF, DM, HTN, CKD, seizures    Interventions/Exercises:   Therapeutic Exercises (03408) Resistance / level Sets/sec Reps Notes   Bike  TM -retro              Review HEP as necessary       Instruction in [facility] as able       Neuromuscular Re-ed (21772) /  Gait Training (36755)       Mat table  Quadruped    Tall kneeling    1/2 kneeling       Core stability ex's                                                 Therapeutic Activities () /  Functional Tasks       Step ups                                                               Manual Intervention (54071)                                                     Modalities:     Pt. Education:  3/8:  -patient educated on diagnosis, prognosis and expectations for rehab  -all patient questions were answered    HEP instruction:  Access Code: [email or web address]  URL: [email or web address]  Date: 03/08/2021  Prepared by: Sue Hairston     Exercises  Seated Table Hamstring Stretch - 3 sets - 30 hold - 3x daily - 7x weekly  Supine 90/90 Abdominal Bracing - 10 reps - 3x daily - 7x weekly  Bridge - 10 reps - 3x daily - 7x weekly  Prone Hip Extension - 10 reps - 2x daily - 7x weekly  Single Leg Stance - 3 sets - 15 sec hold - 3x daily - 7x weekly    NMR and Therapeutic Activities:    [] (88286 or 78591) Provided verbal/tactile cueing for activities related to improving balance, coordination, kinesthetic sense, posture, motor skill, proprioception and motor activation to allow for proper function of   [] LE / Core, hip and LE with self care and ADLs  [] UE / Shoulder complex: cervical, postural, scapular, scapulothoracic and UE control with self care, carrying, lifting, driving, computer work.   [] (52378) Gait Re-education- Provided training and instruction to the patient for proper LE, core and hip recruitment, positioning, and eccentric body weight control with ambulation re-education, including ascending & descending stairs     Home Management Training / Self Care:  [] (31828) Provided self-care/home management training related to activities of daily living and compensatory training, and/or use of adaptive equipment for improvement with: ADLs and compensatory training, meal preparation, safety procedures and instruction in use of adaptive equipment, including bathing, grooming, dressing, personal hygiene, basic household cleaning and chores.      Therapeutic Exercise and NMR EXR  [] (70881) Provided verbal/tactile cueing for activities related to strengthening, flexibility, endurance, ROM for improvements in  [] LE / Core stability: LE, hip, and core control with self care, mobility, lifting, ambulation. [] UE / Shoulder complex: cervical, postural, scapular, scapulothoracic and UE control with self care, reaching, carrying, lifting, house/yardwork, driving, computer work.  [] (81577) Provided verbal/tactile cueing for activities related to improving balance, coordination, kinesthetic sense, posture, motor skill, proprioception to assist with   [] LE / Core stability: LE, hip, and core control in self care, mobility, lifting, ambulation and eccentric single leg control. [] UE / Shoulder complex: cervical, scapular, scapulothoracic and UE control with self care, reaching, carrying, lifting, house/yardwork, driving, computer work.   [] (11305) Therapist is in constant attendance of 2 or more patients providing skilled therapy interventions, but not providing any significant amount of measurable one-on-one time to either patient, for improvements in  [] LE / Core stability: LE, hip, and core control in self care, mobility, lifting, ambulation and eccentric single leg control. [] UE / Shoulder complex: cervical, scapular, scapulothoracic and UE control with self care, reaching, carrying, lifting, house/yardwork, driving, computer work.      Home Exercise Program:    [x] (04813) Reviewed/Progressed HEP activities related to strengthening, flexibility, endurance, ROM of   [] LE / Core stability: core, hip and LE for functional self-care, mobility, lifting and ambulation/stair navigation   [] UE / Shoulder complex: cervical, postural, scapular, scapulothoracic and UE control with self care, reaching, carrying, lifting, house/yardwork, driving, computer work  [] (45690)Reviewed/Progressed HEP activities related to improving balance, coordination, kinesthetic sense, posture, motor skill, proprioception of   [] LE: core, hip and LE for self care, mobility, lifting, and ambulation/stair navigation    [] UE / Shoulder complex: cervical, postural,  scapular, scapulothoracic and UE control with self care, reaching, carrying, lifting, house/yardwork, driving, computer work    Manual Treatments:  PROM / STM / Oscillations-Mobs:  G-I, II, III, IV (PA's, Inf., Post.)  [] (64581) Provided manual therapy to mobilize shoulder complex, hip, LE, and/or cervicothoracic/LS spine soft tissue/joints for the purpose of modulating pain, promoting relaxation,  increasing ROM, reducing/eliminating soft tissue swelling/inflammation/restriction, improving soft tissue extensibility and allowing for proper ROM for normal function with   [] LE / Core stability: self care, mobility, lifting and ambulation. [] UE / Shoulder complex: self care, reaching, carrying, lifting, house/yardwork, driving, computer work. Modalities:  [] (26345) Vasopneumatic compression: Utilized vasopneumatic compression to decrease edema / swelling for the purpose of improving mobility and quad tone / recruitment which will allow for increased overall function including but not limited to self-care, transfers, ambulation, and ascending / descending stairs. Charges:  Timed Code Treatment Minutes: 10   Total Treatment Minutes: 32     [x] EVAL - LOW (26742)   [] EVAL - MOD (40556)  [] EVAL - HIGH (26203)  [] RE-EVAL (71911)  [] TE (06337) x      [] Ionto  [] NMR (97946) x      [] Vaso  [] Manual (82964) x      [] Ultrasound  [x] TA x  1     [] Mech Traction (45028)  [] Gait Training x     [] ES (un) (35295):   [] Aquatic therapy x   [] Other:   [] Group:     GOALS:   Patient stated goal: be able to walk & move without thinking about it  [] Progressing: [] Met: [] Not Met: [] Adjusted    Therapist goals for Patient:   Short Term Goals: To be achieved in: 2 weeks  1. Independent in HEP and progression per patient tolerance, in order to prevent re-injury. [] Progressing: [] Met: [] Not Met: [] Adjusted  2.  Patient will have a decrease in pain to facilitate improvement in movement, function, and ADLs as indicated by Functional Deficits. [] Progressing: [] Met: [] Not Met: [] Adjusted    Long Term Goals: To be achieved in: 4 weeks  1. Patient to demonstrate VALDEZ score 54/56 to demonstrate improved balance to assist with reaching prior level of function. [] Progressing: [] Met: [] Not Met: [] Adjusted  2. Patient will demonstrate increased L ankle DF AROM to 6 deg to allow for proper joint functioning as indicated by patients Functional Deficits. [] Progressing: [] Met: [] Not Met: [] Adjusted  3. Patient will demonstrate an increase in LLE strength to at least 4+/5 throughout to allow for proper functional mobility as indicated by patients Functional Deficits. [] Progressing: [] Met: [] Not Met: [] Adjusted  4. Patient to improve core stability to 7/10 in order to improve initiation of gait and dynamic tasks. [] Progressing: [] Met: [] Not Met: [] Adjusted  5. Patient to improve B hamstring length 90/90 position to lacking 30 deg bilaterally in order to improve lumbar posture with all mobility. [] Progressing: [] Met: [] Not Met: [] Adjusted    Overall Progression Towards Functional goals/ Treatment Progress Update:  [] Patient is progressing as expected towards functional goals listed. [] Progression is slowed due to complexities/Impairments listed. [] Progression has been slowed due to co-morbidities.   [x] Plan just implemented, too soon to assess goals progression <30days   [] Goals require adjustment due to lack of progress  [] Patient is not progressing as expected and requires additional follow up with physician  [] Other    Persisting Functional Limitations/Impairments:  []Sleeping []Sitting               []Standing []Transfers        [x]Walking []Kneeling               [x]Stairs []Squatting / bending   []ADLs []Reaching  [x]Lifting  []Housework  []Driving []Job related tasks  []Sports/Recreation []Other:        ASSESSMENT:  See eval    Treatment/Activity Tolerance:  [x] Patient able to complete tx  [] Patient limited by fatigue  [] Patient limited by pain  [] Patient limited by other medical complications  [] Other:     Prognosis: [x] Good [] Fair  [] Poor    Patient Requires Follow-up: [x] Yes  [] No    Plan for next treatment session:    PLAN: See eval. PT 2x / week for 4 weeks. [] Continue per plan of care [] Alter current plan (see comments)  [x] Plan of care initiated [] Hold pending MD visit [] Discharge    Electronically signed by: Alena Prado PT, DPT    Note: If patient does not return for scheduled/ recommended follow up visits, his note will serve as a discharge from care along with most recent update on progress.

## 2021-03-10 ENCOUNTER — HOSPITAL ENCOUNTER (OUTPATIENT)
Dept: OCCUPATIONAL THERAPY | Age: 56
Setting detail: THERAPIES SERIES
Discharge: HOME | End: 2021-03-10
Payer: COMMERCIAL

## 2021-03-10 ENCOUNTER — HOSPITAL ENCOUNTER (OUTPATIENT)
Dept: PHYSICAL THERAPY | Age: 56
Setting detail: THERAPIES SERIES
Discharge: HOME | End: 2021-03-10
Payer: COMMERCIAL

## 2021-03-10 PROCEDURE — 97110 THERAPEUTIC EXERCISES: CPT

## 2021-03-10 PROCEDURE — 97165 OT EVAL LOW COMPLEX 30 MIN: CPT

## 2021-03-10 PROCEDURE — 97112 NEUROMUSCULAR REEDUCATION: CPT

## 2021-03-10 NOTE — FLOWSHEET NOTE
[address] [facility] Physical Therapy  Phone: [phone]   Fax: [phone]    Physical Therapy Daily Treatment Note  Date:  3/10/2021    Patient Name:  Lit Louis    :  1965  MRN: [id number]  Medical/Treatment Diagnosis Information:  · Diagnosis: I63.231 (ICD-10-CM) - Arterial ischemic stroke, ICA, right, acute (HCC)  · Treatment Diagnosis: Decreased core stability & LLE strength, Impaired B hamstring length  Insurance/Certification information:  PT Insurance Information: Aetna  Physician Information:  Referring Practitioner: Aureliano Cooper MD  Plan of care signed (Y/N): []  Yes [x]  No     Date of Patient follow up with Physician:      Progress Report: []  Yes  [x]  No     Date Range for reporting period:  Beginning  3/8/2021   Ending      Progress report due (10 Rx/or 30 days whichever is less): visit #8 or  46    Recertification due (POC duration/ or 90 days whichever is less): visit #8/     Visit # Insurance Allowable Auth required? Date Range    [place] []  [place]  [x]  No pcy     Latex Allergy:  [x]NO      []YES  Preferred Language for Healthcare:   [x]English       []Other:      Functional Scale/Test Evaluation  30 day  60 day  Discharge    Tinetti Assessment        TUG WNL                   Pain level:  0/10  Location:  na    SUBJECTIVE:   Has been doing okay since last session, is more fatigued today but states that's something he's been dealing with for a long time. Performed HEP yesterday, felt awkward while performing but no difficulty. Reports doctors are hopeful he can receive both a kidney and pancreas transplant this year.       OBJECTIVE: See eval  3/10: 17 mins late      RESTRICTIONS/PRECAUTIONS:  CHF, DM, HTN, CKD w/nightly home HD, seizures    Interventions/Exercises:   Therapeutic Exercises (05843) Resistance / level Sets/sec Reps Notes   Bike  TM -retro   0.8 mph   5 mins            Review HEP as necessary       Instruction in [facility] as able       Neuromuscular Re-ed (01334) /  Gait Training (07166)       Mat table  Quadruped    Tall kneeling    1/2 kneeling       Core stability ex's       //bars  NBOS -trunk rotation  Tandem stance L - cerv rotation  Tandem stance R -cerv flex & ext   AirEx  AirEx  AirEx    10 B  10 B  10 ea    Shuttle Balance  -hip abd   SBA/CGA    10 B                                Therapeutic Activities (34302) /  Functional Tasks       Fwd step up w/contra hip flex   10 B                                                            Manual Intervention (79157)                                                     Modalities:     Pt. Education:  3/8:  -patient educated on diagnosis, prognosis and expectations for rehab  -all patient questions were answered    HEP instruction:  Access Code: [email or web address]  URL: [email or web address]  Date: 03/08/2021  Prepared by: Waldemar June     Exercises  Seated Table Hamstring Stretch - 3 sets - 30 hold - 3x daily - 7x weekly  Supine 90/90 Abdominal Bracing - 10 reps - 3x daily - 7x weekly  Bridge - 10 reps - 3x daily - 7x weekly  Prone Hip Extension - 10 reps - 2x daily - 7x weekly  Single Leg Stance - 3 sets - 15 sec hold - 3x daily - 7x weekly    NMR and Therapeutic Activities:    [] (64275 or 30755) Provided verbal/tactile cueing for activities related to improving balance, coordination, kinesthetic sense, posture, motor skill, proprioception and motor activation to allow for proper function of   [] LE / Core, hip and LE with self care and ADLs  [] UE / Shoulder complex: cervical, postural, scapular, scapulothoracic and UE control with self care, carrying, lifting, driving, computer work.   [] (72370) Gait Re-education- Provided training and instruction to the patient for proper LE, core and hip recruitment, positioning, and eccentric body weight control with ambulation re-education, including ascending & descending stairs     Home Management Training / Self Care:  [] (84505) Provided self-care/home management training related to activities of daily living and compensatory training, and/or use of adaptive equipment for improvement with: ADLs and compensatory training, meal preparation, safety procedures and instruction in use of adaptive equipment, including bathing, grooming, dressing, personal hygiene, basic household cleaning and chores. Therapeutic Exercise and NMR EXR  [] (45843) Provided verbal/tactile cueing for activities related to strengthening, flexibility, endurance, ROM for improvements in  [] LE / Core stability: LE, hip, and core control with self care, mobility, lifting, ambulation. [] UE / Shoulder complex: cervical, postural, scapular, scapulothoracic and UE control with self care, reaching, carrying, lifting, house/yardwork, driving, computer work.  [] (77131) Provided verbal/tactile cueing for activities related to improving balance, coordination, kinesthetic sense, posture, motor skill, proprioception to assist with   [] LE / Core stability: LE, hip, and core control in self care, mobility, lifting, ambulation and eccentric single leg control. [] UE / Shoulder complex: cervical, scapular, scapulothoracic and UE control with self care, reaching, carrying, lifting, house/yardwork, driving, computer work.   [] (13600) Therapist is in constant attendance of 2 or more patients providing skilled therapy interventions, but not providing any significant amount of measurable one-on-one time to either patient, for improvements in  [] LE / Core stability: LE, hip, and core control in self care, mobility, lifting, ambulation and eccentric single leg control. [] UE / Shoulder complex: cervical, scapular, scapulothoracic and UE control with self care, reaching, carrying, lifting, house/yardwork, driving, computer work.      Home Exercise Program:    [x] (56477) Reviewed/Progressed HEP activities related to strengthening, flexibility, endurance, ROM of   [] LE / Core stability: core, hip and LE for functional self-care, mobility, lifting and ambulation/stair navigation   [] UE / Shoulder complex: cervical, postural, scapular, scapulothoracic and UE control with self care, reaching, carrying, lifting, house/yardwork, driving, computer work  [] (05062)Reviewed/Progressed HEP activities related to improving balance, coordination, kinesthetic sense, posture, motor skill, proprioception of   [] LE: core, hip and LE for self care, mobility, lifting, and ambulation/stair navigation    [] UE / Shoulder complex: cervical, postural,  scapular, scapulothoracic and UE control with self care, reaching, carrying, lifting, house/yardwork, driving, computer work    Manual Treatments:  PROM / STM / Oscillations-Mobs:  G-I, II, III, IV (PA's, Inf., Post.)  [] (36823) Provided manual therapy to mobilize shoulder complex, hip, LE, and/or cervicothoracic/LS spine soft tissue/joints for the purpose of modulating pain, promoting relaxation,  increasing ROM, reducing/eliminating soft tissue swelling/inflammation/restriction, improving soft tissue extensibility and allowing for proper ROM for normal function with   [] LE / Core stability: self care, mobility, lifting and ambulation. [] UE / Shoulder complex: self care, reaching, carrying, lifting, house/yardwork, driving, computer work. Modalities:  [] (50868) Vasopneumatic compression: Utilized vasopneumatic compression to decrease edema / swelling for the purpose of improving mobility and quad tone / recruitment which will allow for increased overall function including but not limited to self-care, transfers, ambulation, and ascending / descending stairs.          Charges:  Timed Code Treatment Minutes: 26   Total Treatment Minutes: 26     [] EVAL - LOW (34496)   [] EVAL - MOD (37983)  [] EVAL - HIGH (93553)  [] RE-EVAL (17006)  [x] TE (99713) x  1    [] Ionto  [x] NMR (21462) x  1    [] Vaso  [] Manual (85849) x      [] Ultrasound  [] TA x [] [facility] Traction (99698)  [] Gait Training x     [] ES ([phone]):   [] Aquatic therapy x   [] Other:   [] Group:     GOALS:   Patient stated goal: be able to walk & move without thinking about it  [] Progressing: [] Met: [] Not Met: [] Adjusted    Therapist goals for Patient:   Short Term Goals: To be achieved in: 2 weeks  1. Independent in HEP and progression per patient tolerance, in order to prevent re-injury. [] Progressing: [] Met: [] Not Met: [] Adjusted  2. Patient will have a decrease in pain to facilitate improvement in movement, function, and ADLs as indicated by Functional Deficits. [] Progressing: [] Met: [] Not Met: [] Adjusted    Long Term Goals: To be achieved in: 4 weeks  1. Patient to demonstrate VALDEZ score 54/56 to demonstrate improved balance to assist with reaching prior level of function. [] Progressing: [] Met: [] Not Met: [] Adjusted  2. Patient will demonstrate increased L ankle DF AROM to 6 deg to allow for proper joint functioning as indicated by patients Functional Deficits. [] Progressing: [] Met: [] Not Met: [] Adjusted  3. Patient will demonstrate an increase in LLE strength to at least 4+/5 throughout to allow for proper functional mobility as indicated by patients Functional Deficits. [] Progressing: [] Met: [] Not Met: [] Adjusted  4. Patient to improve core stability to 7/10 in order to improve initiation of gait and dynamic tasks. [] Progressing: [] Met: [] Not Met: [] Adjusted  5. Patient to improve B hamstring length 90/90 position to lacking 30 deg bilaterally in order to improve lumbar posture with all mobility. [] Progressing: [] Met: [] Not Met: [] Adjusted    Overall Progression Towards Functional goals/ Treatment Progress Update:  [] Patient is progressing as expected towards functional goals listed. [] Progression is slowed due to complexities/Impairments listed. [] Progression has been slowed due to co-morbidities.   [x] Plan just implemented, too

## 2021-03-10 NOTE — PLAN OF CARE
[address], [address] [facility] [address]  Phone: [phone]   Fax: [phone]                                                     Occupational Therapy Certification    Dear Dr. Jennifer Rodriguez MD,    We had the pleasure of evaluating the following patient for occupational therapy services at [facility]. A summary of our findings can be found in the initial assessment below. This includes our plan of care. If you have any questions or concerns regarding these findings, please do not hesitate to contact me at the office phone number checked above. Thank you for the referral.       Referring Practitioner Signature:_______________________________Date:__________________  By signing above (or electronic signature), therapists plan is approved by the referring practicioner      Patient: Karli Khan   : 1965   MRN: [id number]  Referring Practitioner:   Dr. Jennifer Rodriguez MD     Evaluation Date: 3/10/2021      Medical Diagnosis Information: D23.572- Arterial ischemic stroke, ICA, right, acute ([facility] [address]                                                      Insurance information:   [employer]- [place], 20% patient responsibility, codes not covered (e-stim, US, cog)    Precautions/ Contra-indications:  iodine allergy  Latex Allergy:  [x]NO      []YES  Preferred Language for Healthcare:   [x]English       []Other:    C-SSRS Triggered by Intake questionnaire (Past 2 wk assessment ):   [x] No, Questionnaire did not trigger screening.   [] Yes, Patient intake triggered C-SSRS Screening     [] Completed, no further action required. [] Completed, PCP notified via [place]  Reason for Seeking OT Services and Patient Goals: Pt states waking 3/1/21 AM and was unable to move LLE, had N&T in LUE and LLE. Attempted to stand but couldn't, so laid back down and went to sleep.   Upon waking from girlfriend, was confused, blood sugar was low, unable to identify girlfriend. Decided to go to hospital, had trouble walking, LLE improved but still tingling and had to drag LLE, almost fell while getting out of car. Admitted to hospital, symptoms slowly improved, about 5 days later, felt he improved about 90%. Returned home Friday, 3/5, felt better Saturday & again on Sunday. Has been limiting activity since being back home, feels energy levels have improved and feels normal.  Does still have \"dead spots\" in LLE but feels it's probably related to diabetic neuropathy. Is walking good but has trouble getting started. Primary complaint is has to think through his tasks initially until he gets going and then is more easily able to complete tasks. Personal Interests and Values: sports, previous football player, spending time with GF/family, movies    Occupational History (Life Experiences Including Prior Level of Function): Pt was independent in all ADLs and IADLs prior to CVA. Pt is currently mod I with all ADLs with increased time and some task modification, and is currently completing some home management tasks. Pt reports his GF and family have been worried about him \"doing too much\" and wanting him to \"take it easy,\" but pt reports he believes it is important to keep active. Performance Patterns (Routines, Roles, Rituals, & Habits): Pt has been unemployed since losing his job due to [employer]. Pt does some work from home. Physical and Social Environments (which aide or inhibit performance in desired occupations):  Pt has his own residence, but had been staying with GF for the last month due to issues with blood sugar. Pt, GF, and GF's daughter live in multi-level home with 6+6 CHARO with rails from [employer]. Pt's GF has a walk-in shower.     Personal, Temporal, and Virtual Contexts (which aide or inhibit performance in desired occupations): highly motivated to regain independence      SUBJECTIVE: Patient stated complaint: decreased strength, coordination, and functional use of LUE; decreased balance    Pain Scale: 0/10  Easing factors: n/a  Provocative factors: Pt reports some discomfort with end range shoulder flexion and abduction    Type: []Constant   []Intermittent  []Radiating []Localized []other:       OBJECTIVE:   Hand dominance: right    Palpation: no tenderness to palpation    Bandages/Dressings/Incisions: n/a    ROM WFL: []Yes []No (if checked, see below)    L elbow, wrist, and hand ROM WFL     PROM AROM    L R L R   Shoulder Flexion    0-120 WNL   Shoulder Abduction    0-108 WNL   Shoulder External Rotation        Shoulder Internal Rotation        Elbow Flexion        Elbow Extension        Pronation        Supination        Wrist Flexion        Wrist Extension        Radial Deviation        Ulnar Deviation       CMC Abduction       5th Digit MCP Extension-Flexion       4th Digit MCP Extension-Flexion       3rd Digit MCP Extension-Flexion       2nd Digit MCP Extension-Flexion       1st Digit MCP Extension-Flexion       5th Digit PIP Extension- Flexion       4th Digit PIP Extension-Flexion       3rd Digit PIP Extension-Flexion       2nd Digit PIP Extension-Flexion       1st Digit IP Extension-Flexion       5th Digit DIP Extension-Flexion       4th Digit DIP Extension-Flexion       3rd Digit DIP Extension-Flexion       2nd Digit DIP Extension-Flexion       Composite Flexion (Tip of 3rd Digit to Distal Palmar Crease (cm))   0 cm 0 cm     Joint mobility:   [x]Normal    []Hypo   []Hyper    Strength WFL: [x]Yes []No (if checked, see below)     LUE grossly 4/5, RUE 5/5    Strength (0-5) Left Right    Shoulder Flex     Shoulder Abd     Shoulder ER     Shoulder IR     Biceps     Triceps     Pronation      Supination      Wrist Flex     Wrist Ext     Wrist Radial Deviation         Orthopaedic Special Tests Positive  Negative  NT Comments    Shoulder        Empty Can              Elbow        Cozen's       Tinel's Hand/Wrist       Finkelstein's       Tinel's       Phalen's       Froment's       Benson Sign       Boutoniere Deformity       Broadway Neck Deformity       Heberden's Nodes (DIP)       Kin's Nodes (PIP)       Mallet Finger       Grind Test Kaylan Grijalva)                      Hand Assessment Left  Right  Comments    9 Hole Peg Test (s) 38 24     Strength (lbs) 60 80 avg of 3   Lateral Pinch Strength (lbs)      Palmar Pinch Strength (lbs)      Tip Pinch Strength (lbs)      Opposition (Y/N) Y Y      Functional Outcome Measures: Quick DASH: total score- 26 , disability score- 34.09%    Stroke Rehabilitation Assessment of Movement (STREAM): total- 66/70 (supine- 15/15, sitting- 29/31, standing- 22/24)        Functional Mobility/Transfers: independent in ambulation, transfers, and bed mobility; slightly increased time     Posture:  slight lateral lean to R with L shoulder elevation    Synergy/Muscle tone: normotonic; slight proximal hypertonicity in L shoulder    Sensation: mild-moderate n/t in fingertips and L foot    Coordination:  Difficulty with in-hand manipulation     Visual Acuity: wears glasses at all times    Perception: WFL    MVPT:      Trail making A:     Trail making B:     Visual field cut:    Cognition: WFL, good insight into deficits                  [x] Patient history, allergies, meds reviewed. Medical chart reviewed. See intake form. Review Of Systems (ROS):  [x]Performed Review of systems (Integumentary, CardioPulmonary, Neurological) by intake and observation. Intake form has been scanned into medical record. Patient has been instructed to contact their primary care physician regarding ROS issues if not already being addressed at this time.       Co-morbidities/Complexities (which will affect course of rehabilitation):   []None        []Hx of COVID   Arthritic conditions   []Rheumatoid arthritis (M05.9)  []Osteoarthritis (M19.91)  []Gout   Cardiovascular conditions   [x]Hypertension (I10)  []Hyperlipidemia (E78.5)  []Angina pectoris (I20)  []Atherosclerosis (I70)  []Pacemaker  [x]Hx of CABG/stent/  cardiac surgeries (bypass x3)   Musculoskeletal conditions   []Disc pathology   []Congenital spine pathologies   []Osteoporosis (M81.8)  []Osteopenia (M85.8)  []Scoliosis       Endocrine conditions   []Hypothyroid (E03.9)  []Hyperthyroid Gastrointestinal conditions   []Constipation (W42.63)   Metabolic conditions   []Morbid obesity (E66.01)  [x]Diabetes type 1(E10.65) or 2 (E11.65)   [x]Neuropathy (G60.9)     Cardio/Pulmonary conditions   []Asthma (J45)  []Coughing   []COPD (J44.9)  [x]CHF  []A-fib   Psychological Disorders  []Anxiety (F41.9)  []Depression (F32.9)   []Other:   Developmental Disorders  []Autism (F84.0)  []CP (G80)  []Down Syndrome (Q90.9)  []Developmental delay     Neurological conditions  []Prior Stroke (I69.30)  []Parkinson's (G20)  []Encephalopathy (G93.40)  []MS (G35)  []Post-polio (G14)  []SCI  []TBI  []ALS Other conditions  []Fibromyalgia (M79.7)  []Vertigo  []Syncope  []Kidney Failure  []Cancer      []currently undergoing                treatment  []Pregnancy  []Incontinence   Prior surgeries  []involved limb  []previous spinal surgery  [] section birth  []hysterectomy  []bowel / bladder surgery  []other relevant surgeries   []Other:  stage 2 CKD, CAD, PVD, seizures       Barriers to/and or personal factors that will affect rehab potential:              []Age  []Sex    []Smoker              [x]Motivation                   [x]Co-Morbidities              []Cognitive Function, education/learning barriers              []Environmental, home barriers              []profession/work barriers  []past PT/medical experience  []other:  Justification: Pt would benefit from skilled outpatient OT services to address decreased strength, coordination, and functional use of LUE; and decreased balance, impacting return to PLOF.        Falls Risk Assessment (30 days):   [x] Falls risk assessed and no intervention required. [] Falls risk assessed (via clinical reasoning) and patient requires intervention due to being higher risk for falls  [] Falls education provided, including       ASSESSMENT: The pt has chief complaints of decreased strength, coordination, and functional use of LUE; and decreased balance. These symptoms as well as client factor and performance skill deficits create barriers to the patient engaging in desired occupational pursuits. Therefore, the patient is in need of skilled occupational therapy services to mitigate functional deficits in order to allow the patient to return to baseline, prevent further decline, and/or compensate for decreased functional abilities.       Functional Impairments and Activity Limitations (from functional questionnaire, intake, and interview)    []Reduced participation in functional mobility   []Reduced cognition   [x]Reduced participation in high level IADLs   []Reduced participation ADLs   [x]Reduced endurance  [x]Reduced fine motor control   [x]Reduced ROM   [x]Reduced sensation   [x]Reduced coordination  [x]Reduced strength   [x]Reduced balance   []Reduced posture   []Reduced safety awareness   []Reduced functional vision      Participation Restrictions   None noted on order   Prognosis/Rehab Potential:      []Excellent   [x]Good    []Fair   []Poor    Tolerance of evaluation/treatment:    []Excellent   [x]Good    []Fair   []Poor    Occupational Therapy Evaluation Complexity Justification   [x] A Occupational Profile/Medical and Therapy History  [] no personal factors and/or comorbidities that impact the plan of care; brief history relating to presenting problem  [x]1-2 personal factors and/or comorbidities that impact the plan of care; additional review of physical, cognitive, or psychosocial performance  []3 personal factors and/or comorbidities that impact the plan of care; extensive review of physical cognitive, psychosocial performance  [x] Patient Assessment:  [x] a total of 1-3 performance deficits relating to physical, cognitive, psychosocial limitations/restrictions  [] a total of 3-5 performance deficits relating to physical, cognitive, psychosocial limitations/restrictions  [] a total of 5 or more performance deficits relating to physical, cognitive, psychosocial limitations/restrictions  [x] A clinical presentation with:  [x] low complexity, limited amount of treatment options no assessment modification, no co-morbidities  [] moderate analytical complexity, detailed assessments, minimal to moderate modification of assessments, may have co-morbidities  [] high analytic complexity, comprehensive assessments, multiple treatment options, significant modifications of assessment, co-morbidities affecting performance  [x] Clinical decision making of [x] low, [] moderate, [] high complexity using standardized patient assessment instrument and/or measurable assessment of functional outcome. [x] EVAL (LOW) 19410 (typically 15 minutes face-to-face)  [] EVAL (MOD) 95180 (typically 30 minutes face-to-face)  [] EVAL (HIGH) 79383 (typically 45 minutes face-to-face)  [] RE-EVAL 61749    PLAN:  Frequency/Duration: 2 days per week for 5 Weeks:  INTERVENTIONS:  [x] Strength training, ROM, balance training, functional mobility training  [x] Neuromuscular re-education and positioning  [] Modalities as needed that may include: E-stim, Ultrasound, Fluidotherapy, Iontophoresis as indicated, Paraffin, Hot Packs, Cold Packs  [x] Patient/caregiver education on joint protection, postural re-education, activity modification, progression of HEP.     [x] Patient/caregiver education regarding home management and safety as well as ADL and IADL performance  [] Cognitive re-orientation and training  [x] Manual therapy including soft tissue mobilization, manual lymph drainage, and joint manipulations  [] Adaptive Equipment Education and Training  [] Splinting

## 2021-03-10 NOTE — FLOWSHEET NOTE
[facility] - Outpatient Occupational Therapy      Occupational Therapy Daily Treatment Note  Date:  3/10/2021    Patient: Trung Kruger   : 1965   MRN: [id number]  Referring Physician:   Dr. Mike Stephenson MD                  Medical Diagnosis Information: G23.591- Arterial ischemic stroke, ICA, right, acute [facility])                                                 Insurance information:   [employer]- [place], 20% patient responsibility, codes not covered (e-stim, [place], cog)  Date of Injury: 3/1/21 CVA  Date of Surgery: n/a    Progress Report: []  Yes  [x]  No     Date Range for reporting period:  Beginning: 3/10/21  Endin/10/21    Progress report due (10 Rx/or 30 days whichever is less): visit #10 or     Recertification due (POC duration/ or 90 days whichever is less): visit #10 or 6/10/21    Visit # Insurance Allowable Auth required? Date Range   1/10 [place] []  Yes  [x]  No n/a       Latex Allergy:  [x]No      []Yes  Pacemaker:  [x] No       [] Yes     Preferred Language for Healthcare:   [x]English       []other:    Pain level:  0/10     SUBJECTIVE:  See eval    Functional Disability Index:     Quick DASH: total score- 26 , disability score- 34.09%     Stroke Rehabilitation Assessment of Movement (STREAM): total- 66/70 (supine- 15/15, sitting- 29/31, standing- /)    OBJECTIVE: See eval      RESTRICTIONS/PRECAUTIONS: iodine allergy    Exercises/Interventions: Session initiated with assessment of pt status using subjective and objective measures noted in evaluation, followed by collaborative discussion regarding goals. Pt participated in functional assessment. Pt educated in HEP below, demonstrating correct form and verbalizing understanding of completion.        Therapeutic Exercises  Resistance / level Sets/sec Reps Notes                                                                                Neuromuscular Re-ed / Therapeutic Activities Manual Intervention                                                     Modalities:     Patient education:  Eval, POC, HEP- pt verbalized understanding    Home Exercise Program:  Written and verbal HEP instructions provided and reviewed:  · Seated Scapular Retraction into Finger Flicks with Elbow Extension  · Alternating Thumb Opposition   · Plank on Table/Counter with change in TIERA to/from hands and elbows  · In-hand manipulation, palm<>finger translation 2-3 items    Therapeutic Exercise and NMR:  [x] (26121) Provided verbal/tactile cueing for activities related to strengthening, flexibility, endurance, ROM  for improvements in scapular, scapulothoracic and UE control with self care, reaching, carrying, lifting, house/yardwork, driving/computer work. [x] (24734) Provided verbal/tactile cueing for activities related to improving balance, coordination, kinesthetic sense, posture, motor skill, proprioception  to assist with  scapular, scapulothoracic and UE control with self care, reaching, carrying, lifting, house/yardwork, driving/computer work.   [] Comments:    Therapeutic Activities:    [x] (87778 or 70369) Provided verbal/tactile cueing for activities related to improving balance, coordination, kinesthetic sense, posture, motor skill, proprioception and motor activation to allow for proper function of scapular, scapulothoracic and UE control with self care, carrying, lifting, driving/computer work  [] Comments:    Home Exercise Program:    [x] (18056) Reviewed/Progressed HEP activities related to strengthening, flexibility, endurance, ROM of scapular, scapulothoracic and UE control with self care, reaching, carrying, lifting, house/yardwork, driving/computer work  [x] (06286) Reviewed/Progressed HEP activities related to improving balance, coordination, kinesthetic sense, posture, motor skill, proprioception of scapular, scapulothoracic and UE control with self care, reaching, carrying, lifting, house/yardwork, driving/computer work    [] Comments:    Manual Treatments:  PROM / STM / Oscillations-Mobs:  G-I, II, III, IV (PA's, Inf., Post.)  [] (37372) Provided manual therapy to mobilize soft tissue/joints of cervical/CT, scapular GHJ and UE for the purpose of modulating pain, promoting relaxation,  increasing ROM, reducing/eliminating soft tissue swelling/inflammation/restriction, improving soft tissue extensibility and allowing for proper ROM for normal function with self care, reaching, carrying, lifting, house/yardwork, driving/computer work  [] Comments:    ADL Training:  [] (82602) Provided self-care/home management training related to activities of daily living and compensatory training, and/or use of adaptive equipment   [] Comments:     Splinting:  [] Fabrication of:   [] (75864) Orthotic/Prosthetic Management, subsequent encounter  [] (44483) Orthotic management and training (fitting and assessment)  [] Comments:      Charges:  Timed Code Treatment Minutes: 45   Total Treatment Minutes: 45     [x] EVAL (LOW) 80402   [] OT Re-eval (01512)  [] EVAL (MOD) 84286   [] EVAL (HIGH) 50065       [x] Zahida (02511) x    1 [] YXVSX(65465)  [x] NMR (78591) x    1 [] Estim (attended) (95999)   [] Manual ([id number]) x     [] US (13481)  [] TA () x     [] Paraffin (02805)  [] ADL  (88 [phone]) x    [] Splint/L code:    [] Estim (unattended) (22 [id number])  [] Fluidotherapy (70436)  [] Other:    GOALS:   Patient stated goal: independent in IADLs  []? Progressing: []? Met: []? Not Met: []? Adjusted     Therapist goals for Patient:   Short Term Goals: To be achieved in: 30 days  1. Pt will increase hand  strength by at least 8 pounds to improve functional strength and control in IADLs by 4/10/21. []? Progressing: []? Met: []? Not Met: []? Adjusted  2. Pt will improve L hand coordination and control to decrease 9-hole-peg time by at least 10 seconds to improve in-hand manipulation for medication management by 4/10/21.     []? Progressing: []? Met: []? Not Met: []? Adjusted     Long Term Goals: To be achieved by discharge  1. Pt will report a QuickDASH Symptom Severity Scale score of 20% or less indicating increased safety and functional independence in desired occupational pursuits by discharge. []? Progressing: []? Met: []? Not Met: []? Adjusted   2. Pt will increase STREAM total score from 66/70 to 70/70 indicating increased safety and functional independence in desired occupational pursuits by discharge. []? Progressing: []? Met: []? Not Met: []? Adjusted    Progression Towards Functional goals:  [] Patient is progressing as expected towards functional goals listed. [] Progression is slowed due to complexities listed. [] Progression has been slowed due to co-morbidities. [x] Plan just implemented, too soon to assess goals progression  [] All goals are met  [] Other:     ASSESSMENT:  See eval    Treatment/Activity Tolerance:  [x] Patient tolerated treatment well [] Patient limited by fatique  [] Patient limited by pain  [] Patient limited by other medical complications  [] Other:     Prognosis: [x] Good [] Fair  [] Poor    Patient Requires Follow-up: [x] Yes  [] No    PLAN: See eval  [] Continue per plan of care [] Alter current plan (see comments)  [x] Plan of care initiated [] Hold pending MD visit [] Discharge    Electronically signed by: Awanda Claude, OTR/L [id number]    Note: If patient does not return for scheduled/ recommended follow up visits, this note will serve as a discharge from care along with most recent update on progress.

## 2021-03-11 NOTE — CONSULTS
Thyroid ultrasound is normal normal.  There is no audible murmur  or gallop. The jugular venous pressure is difficult to assess. ABDOMEN:  Mildly obese, soft, nontender. EXTREMITIES:  Demonstrate no pitting pretibial dependent edema. There  is no cyanosis. There is no evidence for chronic venous stasis. SKIN:  Otherwise, warm and dry without skin rash. DIAGNOSTIC DATA:  A 12-lead ECG from 07/25/2019 demonstrates sinus  rhythm at 62 beats per minute. There are inferolateral T-wave  abnormalities. IMPRESSIONS:  1. Syncope. 2.  Nonsustained ventricular tachycardia. 3.  Ischemic cardiomyopathy. 4.  Chronic renal insufficiency. 5.  Diabetes mellitus. The patient presents for evaluation for abrupt onset syncope. This is  of particular concern in the presence of fixed myocardial scar and  especially with nonsustained yet monomorphic-appearing ventricular  tachycardia on ECG telemetry monitoring. His episodes of syncope was  preceded by change in posture and may be hemodynamic, but in this  setting, he is at a high-risk for sustained ventricular tachycardia. His left ventricular ejection fraction, while abnormal, does not place  him in a category which would qualify for primary prevention ICD. With  ejection fraction of 35% to 40%, nonsustained monomorphic ventricular  tachycardia, and fixed myocardial scar, electrophysiology studies will  be necessary to determine whether he has sustained ventricular  tachycardia. If this is identified, an ICD would be recommended. RECOMMENDATIONS:  1. Anticoagulation for atrial fibrillation. 2.  Continue beta-blocker and vasodilator. 3.  Monitor renal function post contrast exposure. 4.  Would pursue diagnostic electrophysiology studies. 5.  If sustained ventricular tachycardia is identified, would likely  recommend ICD. Thanks for the opportunity to assist in the care of the patient. Please  contact me if you have any questions regarding his evaluation.         Jyothi Marquis Frida Johnson MD    D: 07/29/2019 14:10:14       T: 07/29/2019 17:52:49     [email or web address]  Job#: [id number]     Doc#: [id number]    CC:

## 2021-03-17 ENCOUNTER — HOSPITAL ENCOUNTER (OUTPATIENT)
Dept: OCCUPATIONAL THERAPY | Age: 56
Setting detail: THERAPIES SERIES
Discharge: HOME | End: 2021-03-17
Payer: COMMERCIAL

## 2021-03-17 ENCOUNTER — HOSPITAL ENCOUNTER (OUTPATIENT)
Dept: PHYSICAL THERAPY | Age: 56
Setting detail: THERAPIES SERIES
Discharge: HOME | End: 2021-03-17
Payer: COMMERCIAL

## 2021-03-17 NOTE — PROGRESS NOTES
Occupational Therapy  Cancellation/No-show Note  Patient Name:  Christen Gong   :  1965   Date:  3/17/2021  Cancelled visits to date: 0  No-shows to date: 1    Patient status for today's appointment patient:  []  Cancelled  []  Rescheduled appointment  [x]  No-show - 3/17/21     Reason given by patient:  []  Patient ill  []  Conflicting appointment  []  No transportation    []  Conflict with work  []  No reason given  []  Other:     Comments:      Phone call information:   [x]  Phone call made today to patient at 3:20 pm at number provided:      [x]  Patient answered, conversation as follows:  Pt notified of missed visit with pt reporting he thought visits were on Friday. Pt notified that visits are both Wednesday and Friday with pt reporting he will be unable to attend today for OT and PT. Pt notified of appt times on Friday with pt reporting he will be in attendance.     []  Patient did not answer, message left as follows:  []  Phone call not made today    Electronically signed by:  CANDELARIA Brooks/ZENA [id number]

## 2021-03-17 NOTE — PROGRESS NOTES
[address] [facility]    Physical Therapy  Cancellation/No-show Note  Patient Name:  Donna Avelar  :  1965   Date:  3/17/2021    Cancelled visits to date: 1  No-shows to date: 0    For today's appointment patient:  [x]  Cancelled 3/17  []  Rescheduled appointment  []  No-show     Reason given by patient:  []  Patient ill  []  Conflicting appointment  []  No transportation    []  Conflict with work  []  No reason given  [x]  Other:  See below note    Comments:      Phone call information:   [x]  Phone call made today to patient at _ time at number provided:      [x]  Patient answered, conversation as follows:   **OT contact pt after his missed visit with them, states pt unaware of sessions on  and will be here for Friday's apt. []  Patient did not answer, message left as follows:   []  Phone call not made today  []  Phone call not needed - pt contacted us to cancel and provided reason for cancellation.      Electronically signed by:  Masood Lombardi PT

## 2021-03-19 ENCOUNTER — HOSPITAL ENCOUNTER (OUTPATIENT)
Dept: OCCUPATIONAL THERAPY | Age: 56
Setting detail: THERAPIES SERIES
Discharge: HOME | End: 2021-03-19
Payer: COMMERCIAL

## 2021-03-19 ENCOUNTER — TELEPHONE (OUTPATIENT)
Dept: CARDIOLOGY CLINIC | Age: 56
End: 2021-03-19

## 2021-03-19 ENCOUNTER — HOSPITAL ENCOUNTER (OUTPATIENT)
Dept: PHYSICAL THERAPY | Age: 56
Setting detail: THERAPIES SERIES
Discharge: HOME | End: 2021-03-19
Payer: COMMERCIAL

## 2021-03-19 PROCEDURE — 97110 THERAPEUTIC EXERCISES: CPT

## 2021-03-19 PROCEDURE — 97112 NEUROMUSCULAR REEDUCATION: CPT

## 2021-03-19 RX ORDER — ISOSORBIDE MONONITRATE 60 MG/1
60 TABLET, EXTENDED RELEASE ORAL DAILY
Qty: 90 TABLET | Refills: 0
Start: 2021-03-19 | End: 2021-04-19 | Stop reason: DRUGHIGH

## 2021-03-19 NOTE — PROGRESS NOTES
[address] [facility]    Physical Therapy  Cancellation/No-show Note  Patient Name:  Sheldon Chavez  :  1965   Date:  3/19/2021    Cancelled visits to date: 2  No-shows to date: 0    For today's appointment patient:  [x]  Cancelled 3/17, 3/19  []  Rescheduled appointment  []  No-show     Reason given by patient:  []  Patient ill  [x]  Conflicting appointment  []  No transportation    []  Conflict with work  []  No reason given  []  Other:  See below note    Comments:      Phone call information:   []  Phone call made today to patient at _ time at number provided:      []  Patient answered, conversation as follows:   **OT contact pt after his missed visit with them, states pt unaware of sessions on  and will be here for Friday's apt. []  Patient did not answer, message left as follows:   []  Phone call not made today  [x]  Phone call not needed - pt contacted us to cancel and provided reason for cancellation.      Electronically signed by:  Maci Elaine, PT

## 2021-03-19 NOTE — TELEPHONE ENCOUNTER
Spoke with patient. His symptoms are a little different than his normal angina as he is having chest pain in the center and left side that moves into his shoulder and neck (new sx and reminiscent of sx before CABG and stent). Symptoms occur with active, rest relieves discomfort but taking NTG sl tabs also helps resolve symptoms quicker (5 minutes or so). He also notices more symptoms with activity later in the day. He was able to use the  in the morning (late Feb '21) for 45 minutes which did not results in anginal symptoms. He also noticed BP elevated at 185/90 and 188/78 with two recent chest pain episodes (? Did chest pain trigger BP elevation or visa versa). BP this am before meds 141/32. Later this am after meds, BP was 126/70. He takes Ranexa 500 mg BID - states 1000 mg BID seems to trigger more chest pain. Also takes Imdur 30 mg daily in am.      Discussed checking BP several times a day and recording BP readings. Asked patient to bring BP log and BP machine to ABHAY Ford NP appt on 3/24/21. Will discuss with NALLELY-NP and call patient back with recommendations.

## 2021-03-19 NOTE — TELEPHONE ENCOUNTER
Discussed with Farida Jiménez NP. She recommends increasing Imdur to 60 mg daily and agrees with BP monitoring and bringing NP log and machine to upcoming visit. Spoke to patient and he is agreeable to increasing Imdur to 30 mg. Advised to take an extra 30 mg now, then take two 30 mg tablets in morning until he sees TS-NP. Med list updated, but refill not sent as patient has plenty of 30 mg tabs. If he continues this dose, prescription can be sent to pharmacy at his upcoming visit. Patient voiced understanding of all information discussed.

## 2021-03-19 NOTE — FLOWSHEET NOTE
[facility] - Outpatient Occupational Therapy      Occupational Therapy Daily Treatment Note  Date:  3/19/2021    Patient: Carlos Hobbs   : 1965   MRN: [id number]  Referring Physician:   Dr. Christa Bustillo MD                  Medical Diagnosis Information: M63.461- Arterial ischemic stroke, ICA, right, acute [facility])                                                 Insurance information:   [employer]- [place], 20% patient responsibility, codes not covered (e-stim, [place], cog)  Date of Injury: 3/1/21 CVA  Date of Surgery: n/a    Progress Report: []  Yes  [x]  No     Date Range for reporting period:  Beginning: 3/10/21  Endin/10/21    Progress report due (10 Rx/or 30 days whichever is less): visit #10 or 45    Recertification due (POC duration/ or 90 days whichever is less): visit #10 or 6/10/21    Visit # Insurance Allowable Auth required? Date Range   2/10 [place] []  Yes  [x]  No n/a       Latex Allergy:  [x]No      []Yes  Pacemaker:  [x] No       [] Yes     Preferred Language for Healthcare:   [x]English       []other:    Pain level:  0/10     SUBJECTIVE:  Pt reports he has not had be \"so intentional\" with his hand movements recently and is having an easier time grasping. Functional Disability Index:     Quick DASH: total score- 26 , disability score- 34.09%     Stroke Rehabilitation Assessment of Movement (STREAM): total- 66/70 (supine- 15/15, sitting- 29/31, standing- 22/24)    OBJECTIVE: See eval      RESTRICTIONS/PRECAUTIONS: iodine allergy    Exercises/Interventions: Treatment focused on balance and gross and fine motor coordination to improve functional participation in IADLs. Session initiated with 4 minutes UBE for ROM, strengthening, and circulatory benefits with pt demonstrated good L hand grasp. Pt sat at table to pass bean bag between hands for reaction time, agility, and coordination of grasp/release with ~60% accuracy.  Pt stood to participate in catching/tossing task for dynamic balance, L hand coordination, and visual motor skills, catching bean bag in L hand and immediately tossing into bucket with 80% accuracy catching and 40% accuracy throwing to target. Next, pt learned and practiced repetitive, sequenced TE with focus on balance, coordination, and proprioception including alternating step and reach, and weight shifting with alternating reach with instructions to include in HEP; pt demonstrated good motor planning with primarily SBA for balance with 2 instances CGA due to decreased balance in staggered stance; pt demonstrated proper form and verbalized understanding of safety modification at home to complete with R side grasping countertop. Pt returned to sit at table to participate in pattern recognition and fine motor task, placing small pegs in peg board following illustrated pattern; focus on in-hand manipulation of pegs with pt grasping multiple in palm and translating to pincer grasp to place with pt dropping 6/24 pegs and frequently using lateral pinch due to decreased dexterity. Pt to continue in-hand manipulation practice at home.        Therapeutic Exercises  Resistance / level Sets/sec Reps Notes                                                                                Neuromuscular Re-ed / Therapeutic Activities                                                 Manual Intervention                                                     Modalities:     Patient education:  Eval, POC, HEP- pt verbalized understanding    Home Exercise Program:  Written and verbal HEP instructions provided and reviewed:  · Seated Scapular Retraction into Finger Flicks with Elbow Extension  · Alternating Thumb Opposition   · Teresa Tom on Table/Counter with change in TIERA to/from hands and elbows  · In-hand manipulation, palm<>finger translation 2-3 items  · 3/19/21: rock and reach     Therapeutic Exercise and NMR:  [x] (82946) Provided verbal/tactile cueing for activities related to strengthening, flexibility, endurance, ROM  for improvements in scapular, scapulothoracic and UE control with self care, reaching, carrying, lifting, house/yardwork, driving/computer work. [x] (98945) Provided verbal/tactile cueing for activities related to improving balance, coordination, kinesthetic sense, posture, motor skill, proprioception  to assist with  scapular, scapulothoracic and UE control with self care, reaching, carrying, lifting, house/yardwork, driving/computer work.   [] Comments:    Therapeutic Activities:    [x] (08414 or 16296) Provided verbal/tactile cueing for activities related to improving balance, coordination, kinesthetic sense, posture, motor skill, proprioception and motor activation to allow for proper function of scapular, scapulothoracic and UE control with self care, carrying, lifting, driving/computer work  [] Comments:    Home Exercise Program:    [x] (48492) Reviewed/Progressed HEP activities related to strengthening, flexibility, endurance, ROM of scapular, scapulothoracic and UE control with self care, reaching, carrying, lifting, house/yardwork, driving/computer work  [x] (13300) Reviewed/Progressed HEP activities related to improving balance, coordination, kinesthetic sense, posture, motor skill, proprioception of scapular, scapulothoracic and UE control with self care, reaching, carrying, lifting, house/yardwork, driving/computer work    [] Comments:    Manual Treatments:  PROM / STM / Oscillations-Mobs:  G-I, II, III, IV (PA's, Inf., Post.)  [] (42583) Provided manual therapy to mobilize soft tissue/joints of cervical/CT, scapular GHJ and UE for the purpose of modulating pain, promoting relaxation,  increasing ROM, reducing/eliminating soft tissue swelling/inflammation/restriction, improving soft tissue extensibility and allowing for proper ROM for normal function with self care, reaching, carrying, lifting, house/yardwork, driving/computer work  [] Comments:    ADL goals:  [] Patient is progressing as expected towards functional goals listed. [] Progression is slowed due to complexities listed. [] Progression has been slowed due to co-morbidities. [x] Plan just implemented, too soon to assess goals progression  [] All goals are met  [] Other:     ASSESSMENT:  See eval    Treatment/Activity Tolerance:  [x] Patient tolerated treatment well [] Patient limited by fatique  [] Patient limited by pain  [] Patient limited by other medical complications  [] Other:     Prognosis: [x] Good [] Fair  [] Poor    Patient Requires Follow-up: [x] Yes  [] No    PLAN: See eval  [] Continue per plan of care [] Alter current plan (see comments)  [x] Plan of care initiated [] Hold pending MD visit [] Discharge    Electronically signed by: Madison Michaud OTR/L [id number]    Note: If patient does not return for scheduled/ recommended follow up visits, this note will serve as a discharge from care along with most recent update on progress.

## 2021-03-24 ENCOUNTER — HOSPITAL ENCOUNTER (OUTPATIENT)
Dept: OCCUPATIONAL THERAPY | Age: 56
Setting detail: THERAPIES SERIES
Discharge: HOME | End: 2021-03-24
Payer: COMMERCIAL

## 2021-03-24 ENCOUNTER — HOSPITAL ENCOUNTER (OUTPATIENT)
Dept: PHYSICAL THERAPY | Age: 56
Setting detail: THERAPIES SERIES
Discharge: HOME | End: 2021-03-24
Payer: COMMERCIAL

## 2021-03-24 ENCOUNTER — OFFICE VISIT (OUTPATIENT)
Dept: CARDIOLOGY CLINIC | Age: 56
End: 2021-03-24
Payer: COMMERCIAL

## 2021-03-24 ENCOUNTER — OFFICE VISIT (OUTPATIENT)
Dept: PULMONOLOGY | Age: 56
End: 2021-03-24
Payer: COMMERCIAL

## 2021-03-24 VITALS
HEART RATE: 84 BPM | DIASTOLIC BLOOD PRESSURE: 80 MMHG | SYSTOLIC BLOOD PRESSURE: 142 MMHG | HEIGHT: 68 IN | BODY MASS INDEX: 30.39 KG/M2 | WEIGHT: 200.5 LBS | OXYGEN SATURATION: 99 %

## 2021-03-24 VITALS
SYSTOLIC BLOOD PRESSURE: 128 MMHG | OXYGEN SATURATION: 98 % | HEIGHT: 68 IN | BODY MASS INDEX: 29.4 KG/M2 | HEART RATE: 77 BPM | WEIGHT: 194 LBS | DIASTOLIC BLOOD PRESSURE: 80 MMHG

## 2021-03-24 DIAGNOSIS — G47.33 MODERATE OBSTRUCTIVE SLEEP APNEA: Primary | ICD-10-CM

## 2021-03-24 DIAGNOSIS — I25.83 CORONARY ARTERY DISEASE DUE TO LIPID RICH PLAQUE: Primary | ICD-10-CM

## 2021-03-24 DIAGNOSIS — I10 HTN (HYPERTENSION), BENIGN: ICD-10-CM

## 2021-03-24 DIAGNOSIS — G47.10 HYPERSOMNIA: ICD-10-CM

## 2021-03-24 DIAGNOSIS — R06.83 SNORING: ICD-10-CM

## 2021-03-24 DIAGNOSIS — I10 HTN (HYPERTENSION), BENIGN: Chronic | ICD-10-CM

## 2021-03-24 DIAGNOSIS — I25.5 ISCHEMIC CARDIOMYOPATHY: ICD-10-CM

## 2021-03-24 DIAGNOSIS — I63.231 ARTERIAL ISCHEMIC STROKE, ICA, RIGHT, ACUTE (HCC): ICD-10-CM

## 2021-03-24 DIAGNOSIS — I48.91 ATRIAL FIBRILLATION WITH RAPID VENTRICULAR RESPONSE (HCC): Chronic | ICD-10-CM

## 2021-03-24 DIAGNOSIS — I25.10 CORONARY ARTERY DISEASE DUE TO LIPID RICH PLAQUE: Primary | ICD-10-CM

## 2021-03-24 DIAGNOSIS — N18.2 CKD (CHRONIC KIDNEY DISEASE) STAGE 2, GFR 60-89 ML/MIN: Chronic | ICD-10-CM

## 2021-03-24 DIAGNOSIS — N18.4 TYPE 1 DIABETES MELLITUS WITH STAGE 4 CHRONIC KIDNEY DISEASE (HCC): Chronic | ICD-10-CM

## 2021-03-24 DIAGNOSIS — E10.22 TYPE 1 DIABETES MELLITUS WITH STAGE 4 CHRONIC KIDNEY DISEASE (HCC): Chronic | ICD-10-CM

## 2021-03-24 DIAGNOSIS — I50.9 CONGESTIVE HEART FAILURE, UNSPECIFIED HF CHRONICITY, UNSPECIFIED HEART FAILURE TYPE (HCC): Chronic | ICD-10-CM

## 2021-03-24 PROCEDURE — G8417 CALC BMI ABV UP PARAM F/U: HCPCS | Performed by: NURSE PRACTITIONER

## 2021-03-24 PROCEDURE — 3052F HG A1C>EQUAL 8.0%<EQUAL 9.0%: CPT | Performed by: NURSE PRACTITIONER

## 2021-03-24 PROCEDURE — 1036F TOBACCO NON-USER: CPT | Performed by: NURSE PRACTITIONER

## 2021-03-24 PROCEDURE — 3017F COLORECTAL CA SCREEN DOC REV: CPT | Performed by: NURSE PRACTITIONER

## 2021-03-24 PROCEDURE — 99214 OFFICE O/P EST MOD 30 MIN: CPT | Performed by: NURSE PRACTITIONER

## 2021-03-24 PROCEDURE — 97110 THERAPEUTIC EXERCISES: CPT

## 2021-03-24 PROCEDURE — 1111F DSCHRG MED/CURRENT MED MERGE: CPT | Performed by: NURSE PRACTITIONER

## 2021-03-24 PROCEDURE — 97530 THERAPEUTIC ACTIVITIES: CPT

## 2021-03-24 PROCEDURE — G8482 FLU IMMUNIZE ORDER/ADMIN: HCPCS | Performed by: NURSE PRACTITIONER

## 2021-03-24 PROCEDURE — 97112 NEUROMUSCULAR REEDUCATION: CPT

## 2021-03-24 PROCEDURE — G8427 DOCREV CUR MEDS BY ELIG CLIN: HCPCS | Performed by: NURSE PRACTITIONER

## 2021-03-24 PROCEDURE — 2022F DILAT RTA XM EVC RTNOPTHY: CPT | Performed by: NURSE PRACTITIONER

## 2021-03-24 RX ORDER — HYDRALAZINE HYDROCHLORIDE 100 MG/1
TABLET, FILM COATED ORAL
Qty: 270 TABLET | Refills: 2 | Status: ON HOLD
Start: 2021-03-24 | End: 2021-04-10 | Stop reason: HOSPADM

## 2021-03-24 RX ORDER — NITROGLYCERIN 0.4 MG/1
TABLET SUBLINGUAL
Qty: 25 TABLET | Refills: 1 | Status: SHIPPED | OUTPATIENT
Start: 2021-03-24 | End: 2021-05-19

## 2021-03-24 ASSESSMENT — SLEEP AND FATIGUE QUESTIONNAIRES
ESS TOTAL SCORE: 8
HOW LIKELY ARE YOU TO NOD OFF OR FALL ASLEEP WHILE SITTING INACTIVE IN A PUBLIC PLACE: 0
HOW LIKELY ARE YOU TO NOD OFF OR FALL ASLEEP WHEN YOU ARE A PASSENGER IN A CAR FOR AN HOUR WITHOUT A BREAK: 1
HOW LIKELY ARE YOU TO NOD OFF OR FALL ASLEEP WHILE WATCHING TV: 1
HOW LIKELY ARE YOU TO NOD OFF OR FALL ASLEEP WHILE LYING DOWN TO REST IN THE AFTERNOON WHEN CIRCUMSTANCES PERMIT: 3
HOW LIKELY ARE YOU TO NOD OFF OR FALL ASLEEP WHILE SITTING AND TALKING TO SOMEONE: 0

## 2021-03-24 NOTE — FLOWSHEET NOTE
[facility] - Outpatient Occupational Therapy      Occupational Therapy Daily Treatment Note  Date:  3/24/2021    Patient: Esther Barros   : 1965   MRN: [id number]  Referring Physician:   Dr. Alexis Menchaca MD                  Medical Diagnosis Information: B38.994- Arterial ischemic stroke, ICA, right, acute [facility])                                                 Insurance information:   [employer]- [place], 20% patient responsibility, codes not covered (e-stim, [place], cog)  Date of Injury: 3/1/21 CVA  Date of Surgery: n/a    Progress Report: []  Yes  [x]  No     Date Range for reporting period:  Beginning: 3/10/21  Endin/10/21    Progress report due (10 Rx/or 30 days whichever is less): visit #10 or     Recertification due (POC duration/ or 90 days whichever is less): visit #10 or 6/10/21    Visit # Insurance Allowable Auth required? Date Range   3/10 [place] []  Yes  [x]  No n/a       Latex Allergy:  [x]No      []Yes  Pacemaker:  [x] No       [] Yes     Preferred Language for Healthcare:   [x]English       []other:    Pain level:  0/10     SUBJECTIVE:  Pt reports resolved n/t in L hand with continued improvements in coordination and control. Pt reports continued difficulty with high-level balance tasks. Pt reports his angina has improved with better control over his blood sugar. Pt reports his brother's wedding is this weekend and he will be traveling. Pt reports his family continues to worry about overexertion and has arranged transport for the airport, though pt does not think he needs it. Pt reports he will be starting cardiac rehab soon.      Functional Disability Index:     Quick DASH: total score- 26 , disability score- 34.09%     Stroke Rehabilitation Assessment of Movement (STREAM): total- 66/70 (supine- 15/15, sitting- 29/31, standing- )    OBJECTIVE: See eval      RESTRICTIONS/PRECAUTIONS: iodine allergy    Exercises/Interventions: Treatment focused on balance and gross and fine motor coordination to improve functional participation in IADLs. Session initiated with 5 minutes UBE for ROM, strengthening, and circulatory benefits with no cues required for L hand grasp or body mechanics. Pt then participated in repetitive, sequenced movement patterns with focus on balance, coordination, and proprioception including alternating step and reach, and weight shifting with alternating reach with supervision-SBA for balance and improved amplitude of LUE during reaching. Pt sat at table to participate in dressing task with vision occluded to challenge stereognosis and fine motor components of task with snapping, buttoning, buckling, zipping, and hooking completed with minimal verbal cues and increased time. Transferred task to functional zipping and buttoning of clothes with pt reporting \"more automatic\" use of L hand as stabilizer. For in-hand manipulation and pincer grasp strengthening and control, pt grasped 5 small suction cups in L hand and used palm>finger translation to connect with matching color in R hand; verbal cue for L pincer grasp due to pt assuming tripod grasp instinctually. Session concluded with card game for fine motor skills of shuffling, dealing, and playing cards with slightly increased time required; pt reports improvement since last playing cards ~2 weeks ago.        Therapeutic Exercises  Resistance / level Sets/sec Reps Notes                                                                                Neuromuscular Re-ed / Therapeutic Activities                                                 Manual Intervention                                                     Modalities:     Patient education:  AMMON Candelario, HEP- pt verbalized understanding    Home Exercise Program:  Written and verbal HEP instructions provided and reviewed:  · Seated Scapular Retraction into Finger Flicks with Elbow Extension  · Alternating Thumb Opposition   · Plank on Table/Counter with change in TIERA to/from hands and elbows  · In-hand manipulation, palm<>finger translation 2-3 items  · 3/19/21: rock and reach     Therapeutic Exercise and NMR:  [x] (69356) Provided verbal/tactile cueing for activities related to strengthening, flexibility, endurance, ROM  for improvements in scapular, scapulothoracic and UE control with self care, reaching, carrying, lifting, house/yardwork, driving/computer work. [x] (01207) Provided verbal/tactile cueing for activities related to improving balance, coordination, kinesthetic sense, posture, motor skill, proprioception  to assist with  scapular, scapulothoracic and UE control with self care, reaching, carrying, lifting, house/yardwork, driving/computer work.   [] Comments:    Therapeutic Activities:    [x] (70975 or 87719) Provided verbal/tactile cueing for activities related to improving balance, coordination, kinesthetic sense, posture, motor skill, proprioception and motor activation to allow for proper function of scapular, scapulothoracic and UE control with self care, carrying, lifting, driving/computer work  [] Comments:    Home Exercise Program:    [x] (91080) Reviewed/Progressed HEP activities related to strengthening, flexibility, endurance, ROM of scapular, scapulothoracic and UE control with self care, reaching, carrying, lifting, house/yardwork, driving/computer work  [x] (83842) Reviewed/Progressed HEP activities related to improving balance, coordination, kinesthetic sense, posture, motor skill, proprioception of scapular, scapulothoracic and UE control with self care, reaching, carrying, lifting, house/yardwork, driving/computer work    [] Comments:    Manual Treatments:  PROM / STM / Oscillations-Mobs:  G-I, II, III, IV (PA's, Inf., Post.)  [] (64980) Provided manual therapy to mobilize soft tissue/joints of cervical/CT, scapular GHJ and UE for the purpose of modulating pain, promoting relaxation,  increasing ROM, reducing/eliminating soft tissue swelling/inflammation/restriction, improving soft tissue extensibility and allowing for proper ROM for normal function with self care, reaching, carrying, lifting, house/yardwork, driving/computer work  [] Comments:    ADL Training:  [] (60669) Provided self-care/home management training related to activities of daily living and compensatory training, and/or use of adaptive equipment   [] Comments:     Splinting:  [] Fabrication of:   [] (48204) Orthotic/Prosthetic Management, subsequent encounter  [] (18223) Orthotic management and training (fitting and assessment)  [] Comments:      Charges:  Timed Code Treatment Minutes: 50   Total Treatment Minutes: 50     [] EVAL (LOW) 06680   [] OT Re-eval (12400)  [] EVAL (MOD) 76982   [] EVAL (HIGH) 17622       [x] Zahida (24754) x    1 [] BDSZZ(77241)  [x] NMR (11249) x    1 [] Estim (attended) (39316)   [] Manual ([id number]) x     [] US (31449)  [x] TA (50333) x    1 [] Paraffin (96859)  [] ADL  ([phone] [id number] [phone]) x    [] Splint/L code:    [] Estim (unattended) ([id number])  [] Fluidotherapy (17200)  [] Other:    GOALS:   Patient stated goal: independent in IADLs  [x]? Progressing: []? Met: []? Not Met: []? Adjusted     Therapist goals for Patient:   Short Term Goals: To be achieved in: 30 days  1. Pt will increase hand  strength by at least 8 pounds to improve functional strength and control in IADLs by 4/10/21. [x]? Progressing: []? Met: []? Not Met: []? Adjusted  2. Pt will improve L hand coordination and control to decrease 9-hole-peg time by at least 10 seconds to improve in-hand manipulation for medication management by 4/10/21. [x]? Progressing: []? Met: []? Not Met: []? Adjusted     Long Term Goals: To be achieved by discharge  1. Pt will report a QuickDASH Symptom Severity Scale score of 20% or less indicating increased safety and functional independence in desired occupational pursuits by discharge. [x]? Progressing: []? Met: []? Not Met: []? Adjusted   2.  Pt will increase STREAM total score from 66/70 to 70/70 indicating increased safety and functional independence in desired occupational pursuits by discharge. [x]? Progressing: []? Met: []? Not Met: []? Adjusted    Progression Towards Functional goals:  [x] Patient is progressing as expected towards functional goals listed. [] Progression is slowed due to complexities listed. [] Progression has been slowed due to co-morbidities. [] Plan just implemented, too soon to assess goals progression  [] All goals are met  [] Other:     ASSESSMENT:  See eval    Treatment/Activity Tolerance:  [x] Patient tolerated treatment well [] Patient limited by fatique  [] Patient limited by pain  [] Patient limited by other medical complications  [] Other:     Prognosis: [x] Good [] Fair  [] Poor    Patient Requires Follow-up: [x] Yes  [] No    PLAN: See eval  [x] Continue per plan of care [] Alter current plan (see comments)  [x] Plan of care initiated (MD cosigned) [] Hold pending MD visit [] Discharge    Electronically signed by: CANDELARIA Morris/L [id number]    Note: If patient does not return for scheduled/ recommended follow up visits, this note will serve as a discharge from care along with most recent update on progress.

## 2021-03-24 NOTE — FLOWSHEET NOTE
[facility] - Outpatient Physical Therapy  Phone: [phone]   Fax: [phone]    Physical Therapy Daily Treatment Note  Date:  3/24/2021    Patient Name:  Macario Cuello    :  1965  MRN: [id number]  Medical/Treatment Diagnosis Information:  · Diagnosis: I63.231 (ICD-10-CM) - Arterial ischemic stroke, ICA, right, acute (HCC)  · Treatment Diagnosis: Decreased core stability & LLE strength, Impaired B hamstring length  Insurance/Certification information:  PT Insurance Information: [employer]  Physician Information:  Referring Practitioner: Kathryn Menchaca MD  Plan of care signed (Y/N): []  Yes [x]  No     Date of Patient follow up with Physician:      Progress Report: []  Yes  [x]  No     Date Range for reporting period:  Beginning  3/8/2021   Ending      Progress report due (10 Rx/or 30 days whichever is less): visit #8 or  24    Recertification due (POC duration/ or 90 days whichever is less): visit #8/     Visit # Insurance Allowable Auth required? Date Range   3/8 [place] []  Yes  [x]  No pcy     Latex Allergy:  [x]NO      []YES  Preferred Language for Healthcare:   [x]English       []Other:      Functional Scale/Test Evaluation  30 day  60 day  Discharge    Tinetti Assessment        TUG WNL                   Pain level:  0/10  Location:  na    SUBJECTIVE:     Still feels that balance isn't very good, has been doing SLS a couple times daily but is not improving much. States other ex's are going okay too. Denies pain today and N&T in L hand has alleviated.       OBJECTIVE:    3/10: 17 mins late      RESTRICTIONS/PRECAUTIONS:  CHF, DM, HTN, CKD w/nightly home HD, seizures    Interventions/Exercises:   Therapeutic Exercises (57751) Resistance / level Sets/sec Reps Notes   Bike  TM -retro   0.8 mph 5 mins       IB / HR&TR  2x30\" / 2x10     Review HEP as necessary       Instruction in [address] as able       Neuromuscular Re-ed (95909) /  Gait Training (98440)       Mat table  Quadruped    Tall kneeling    1/2 kneeling       Core stability ex's       //bars  NBOS -trunk rotation  Tandem stance L & R - cerv rotation  Tandem stance R -cerv flex & ext    B Heel raises off side   AirEx  AirEx  AirEx      15 B ea      15    Shuttle Balance  -hip abd  -DLS w/EC, cerv rotation     SBA      10 B                                Therapeutic Activities (25491) /  Functional Tasks       Fwd step up w/contra hip flex 6\"  10 B -intermittent UE assist                                                           Manual Intervention (24456)                                                     Modalities:     Pt. Education:  3/8:  -patient educated on diagnosis, prognosis and expectations for rehab  -all patient questions were answered    HEP instruction:       Access Code: [email or web address]  URL: [email or web address]  Date: 03/24/2021  Prepared by: Kwame Humlata    Exercises  Standing Hip Extension with Anchored Resistance - 2 x daily - 7 x weekly - 10-15 reps  Standing Hip Abduction with Anchored Resistance - 2 x daily - 7 x weekly - 10-15 reps  Standing Hip Flexion with Anchored Resistance and Chair Support - 2 x daily - 7 x weekly - 10-15 reps  Heel Raise on Step - 2 x daily - 7 x weekly - 10-15 reps      Access Code: [email or web address]  URL: [email or web address]  Date: 03/08/2021  Prepared by: Kwame Alfonso     Exercises  Seated Table Hamstring Stretch - 3 sets - 30 hold - 3x daily - 7x weekly  Supine 90/90 Abdominal Bracing - 10 reps - 3x daily - 7x weekly  Bridge - 10 reps - 3x daily - 7x weekly  Prone Hip Extension - 10 reps - 2x daily - 7x weekly  Single Leg Stance - 3 sets - 15 sec hold - 3x daily - 7x weekly    NMR and Therapeutic Activities:    [] (78658 or 01777) Provided verbal/tactile cueing for activities related to improving balance, coordination, kinesthetic sense, posture, motor skill, proprioception and motor activation to allow for proper function of   [] LE / Core, hip and LE with self care and ADLs  [] UE / Shoulder complex: cervical, postural, scapular, scapulothoracic and UE control with self care, carrying, lifting, driving, computer work.   [] (88894) Gait Re-education- Provided training and instruction to the patient for proper LE, core and hip recruitment, positioning, and eccentric body weight control with ambulation re-education, including ascending & descending stairs     Home Management Training / Self Care:  [] (19356) Provided self-care/home management training related to activities of daily living and compensatory training, and/or use of adaptive equipment for improvement with: ADLs and compensatory training, meal preparation, safety procedures and instruction in use of adaptive equipment, including bathing, grooming, dressing, personal hygiene, basic household cleaning and chores. Therapeutic Exercise and NMR EXR  [] (65551) Provided verbal/tactile cueing for activities related to strengthening, flexibility, endurance, ROM for improvements in  [] LE / Core stability: LE, hip, and core control with self care, mobility, lifting, ambulation. [] UE / Shoulder complex: cervical, postural, scapular, scapulothoracic and UE control with self care, reaching, carrying, lifting, house/yardwork, driving, computer work.  [] (68666) Provided verbal/tactile cueing for activities related to improving balance, coordination, kinesthetic sense, posture, motor skill, proprioception to assist with   [] LE / Core stability: LE, hip, and core control in self care, mobility, lifting, ambulation and eccentric single leg control.    [] UE / Shoulder complex: cervical, scapular, scapulothoracic and UE control with self care, reaching, carrying, lifting, house/yardwork, driving, computer work.   [] (89605) Therapist is in constant attendance of 2 or more patients providing skilled therapy interventions, but not providing any significant amount of measurable one-on-one time to either patient, for improvements in  [] LE / Core stability: LE, hip, and core control in self care, mobility, lifting, ambulation and eccentric single leg control. [] UE / Shoulder complex: cervical, scapular, scapulothoracic and UE control with self care, reaching, carrying, lifting, house/yardwork, driving, computer work. Home Exercise Program:    [x] (33793) Reviewed/Progressed HEP activities related to strengthening, flexibility, endurance, ROM of   [] LE / Core stability: core, hip and LE for functional self-care, mobility, lifting and ambulation/stair navigation   [] UE / Shoulder complex: cervical, postural, scapular, scapulothoracic and UE control with self care, reaching, carrying, lifting, house/yardwork, driving, computer work  [] (27038)Reviewed/Progressed HEP activities related to improving balance, coordination, kinesthetic sense, posture, motor skill, proprioception of   [] LE: core, hip and LE for self care, mobility, lifting, and ambulation/stair navigation    [] UE / Shoulder complex: cervical, postural,  scapular, scapulothoracic and UE control with self care, reaching, carrying, lifting, house/yardwork, driving, computer work    Manual Treatments:  PROM / STM / Oscillations-Mobs:  G-I, II, III, IV (PA's, Inf., Post.)  [] (43395) Provided manual therapy to mobilize shoulder complex, hip, LE, and/or cervicothoracic/LS spine soft tissue/joints for the purpose of modulating pain, promoting relaxation,  increasing ROM, reducing/eliminating soft tissue swelling/inflammation/restriction, improving soft tissue extensibility and allowing for proper ROM for normal function with   [] LE / Core stability: self care, mobility, lifting and ambulation. [] UE / Shoulder complex: self care, reaching, carrying, lifting, house/yardwork, driving, computer work.      Modalities:  [] (50245) Vasopneumatic compression: Utilized vasopneumatic compression to decrease edema / swelling for the purpose of improving mobility and quad tone / recruitment which will allow for increased overall function including but not limited to self-care, transfers, ambulation, and ascending / descending stairs. Charges:  Timed Code Treatment Minutes: 43   Total Treatment Minutes: 43     [] EVAL - LOW (97402)   [] EVAL - MOD (40823)  [] EVAL - HIGH (48040)  [] RE-EVAL (87418)  [x] TE (15505) x  2    [] Ionto  [x] NMR (13129) x  1    [] Vaso  [] Manual (68995) x      [] Ultrasound  [] TA x       [] Mech Traction (57404)  [] Gait Training x     [] ES (un) (60665):   [] Aquatic therapy x   [] Other:   [] Group:     GOALS:   Patient stated goal: be able to walk & move without thinking about it  [] Progressing: [] Met: [] Not Met: [] Adjusted    Therapist goals for Patient:   Short Term Goals: To be achieved in: 2 weeks  1. Independent in HEP and progression per patient tolerance, in order to prevent re-injury. [] Progressing: [] Met: [] Not Met: [] Adjusted  2. Patient will have a decrease in pain to facilitate improvement in movement, function, and ADLs as indicated by Functional Deficits. [] Progressing: [] Met: [] Not Met: [] Adjusted    Long Term Goals: To be achieved in: 4 weeks  1. Patient to demonstrate VALDEZ score 54/56 to demonstrate improved balance to assist with reaching prior level of function. [] Progressing: [] Met: [] Not Met: [] Adjusted  2. Patient will demonstrate increased L ankle DF AROM to 6 deg to allow for proper joint functioning as indicated by patients Functional Deficits. [] Progressing: [] Met: [] Not Met: [] Adjusted  3. Patient will demonstrate an increase in LLE strength to at least 4+/5 throughout to allow for proper functional mobility as indicated by patients Functional Deficits. [] Progressing: [] Met: [] Not Met: [] Adjusted  4. Patient to improve core stability to 7/10 in order to improve initiation of gait and dynamic tasks. [] Progressing: [] Met: [] Not Met: [] Adjusted  5.  Patient to improve B hamstring length 90/90 position to lacking 30 deg bilaterally in order to improve lumbar posture with all mobility. [] Progressing: [] Met: [] Not Met: [] Adjusted    Overall Progression Towards Functional goals/ Treatment Progress Update:  [] Patient is progressing as expected towards functional goals listed. [] Progression is slowed due to complexities/Impairments listed. [] Progression has been slowed due to co-morbidities. [x] Plan just implemented, too soon to assess goals progression <30days   [] Goals require adjustment due to lack of progress  [] Patient is not progressing as expected and requires additional follow up with physician  [] Other    Persisting Functional Limitations/Impairments:  []Sleeping []Sitting               []Standing []Transfers        [x]Walking []Kneeling               [x]Stairs []Squatting / bending   []ADLs []Reaching  [x]Lifting  []Housework  []Driving []Job related tasks  []Sports/Recreation []Other:        ASSESSMENT:   Patient's balance is improving despite pt feels like he has significant deficits. Expressed to pt and allowed him to perform high level dynamic tasks that required good balance, pt able to perform and report good balance at end of session. Continue to progress BLE strength and balance to return to PLOF. Transition to [facility] as pt demo's unchanged stamina during session. Treatment/Activity Tolerance:  [x] Patient able to complete tx  [] Patient limited by fatigue  [] Patient limited by pain  [] Patient limited by other medical complications  [] Other:     Prognosis: [x] Good [] Fair  [] Poor    Patient Requires Follow-up: [x] Yes  [] No    Plan for next treatment session:    PLAN: See eval. PT 2x / week for 4 weeks.    [x] Continue per plan of care [] Alter current plan (see comments)  [] Plan of care initiated [] Hold pending MD visit [] Discharge    Electronically signed by: Mariama Awad PT, DPT    Note: If patient does not return for scheduled/ recommended follow up visits, his note will serve as a discharge from care along with most recent update on progress.

## 2021-03-24 NOTE — ASSESSMENT & PLAN NOTE
Needs treatment. A split night study was originally ordered but patient qualified too late in the night to complete the titration portion of the study. Will order an in lab titration. Given EF 30-35% need to watch for central sleep apnea.

## 2021-03-24 NOTE — PROGRESS NOTES
Fiorella Lr MD, Chaya Whelan, [facility]  Tiffanie Kehrt CNP Jeraldene Peels CNP [facility] [address]  [address] [facility], [address] [facility]- [phone]   [facility] [address]  [address] [facility]. [address] [phone]     [address] [facility] [address]  [id number] [phone]      Assessment/Plan:     1. Moderate obstructive sleep apnea  Assessment & Plan:  Needs treatment. A split night study was originally ordered but patient qualified too late in the night to complete the titration portion of the study. Will order an in lab titration. Given EF 30-35% need to watch for central sleep apnea. Orders:  -     Sleep Study with PAP Titration; Future  2. Congestive heart failure, unspecified HF chronicity, unspecified heart failure type [facility])  Assessment & Plan:  Chronic-Stable: Continue medications per PCP and other physicians. 3. Atrial fibrillation with rapid ventricular response (HCC)  Assessment & Plan:  Chronic-Stable: Continue medications per PCP and other physicians. 4. HTN (hypertension), benign  Assessment & Plan:  Chronic-Stable: Continue medications per PCP and other physicians. 5. CKD (chronic kidney disease) stage 2, GFR 60-89 ml/min  Assessment & Plan:  Chronic-Stable: Continue medications per PCP and other physicians. 6. Type 1 diabetes mellitus with stage 4 chronic kidney disease ([facility] [address]  Assessment & Plan:  Chronic-Stable: Continue medications per PCP and other physicians. 7. Hypersomnia  -     Sleep Study with PAP Titration; Future  8. Snoring  -     Sleep Study with PAP Titration; Future    Review of results of unique test 2D echo on 3/1/21 that showed:LVEF of 30-35%. Patient following cardiology. Split night: 8/21/20- Moderate obstructive sleep apnea AHI 16.9 (RDI 59.1). Low SaO2 of 88% with time below 90% of 0.1 min. Patient qualified too late in the night for split-night study.  A trial of CPAP/Bilevel titration is recommended. This information was analyzed to assess complexity and medical decision making in regards to further testing and management. The primary encounter diagnosis was Moderate obstructive sleep apnea. Diagnoses of Congestive heart failure, unspecified HF chronicity, unspecified heart failure type ([facility] [address] Atrial fibrillation with rapid ventricular response ([facility] [address] HTN (hypertension), benign, CKD (chronic kidney disease) stage 2, GFR 60-89 ml/min, Type 1 diabetes mellitus with stage 4 chronic kidney disease ([facility] [address] Hypersomnia, and Snoring were also pertinent to this visit. The chronic medical conditions listed are directly related to the primary diagnosis listed above. The management of the primary diagnosis affects the secondary diagnosis and vice versa. Total time spent with patient was 36 minutes. This time includes preparing to see the patient (review of medical history, previous visits, and compliance reports), performing a medically necessary appropriate examination and/or evaluation, analyzing physiological data from the patient's machine and recommending changes as a result of my analysis. counseling and educating the patient/family/caregiver, ordering medications, tests, or procedures, referring and communicating with other health care professionals, documenting clinical information in the electronic or other health record, independently interpreting results and communicating results to the patient/family/caregiver and care coordination. Subjective:     Patient ID: Donna Avelar is a 50 y.o. male. Chief Complaint   Patient presents with    Follow-up     Subjective   HPI:    Tawana Wiseman is here for a follow up after his last visit on 7/13/2020. He is experiencing excessive daytime sleepiness, non restorative sleep, snoring, waking choking/ gasping, napping, tossing & turning at night, and has a family history of sleep apnea.  These symptoms are similar to what he was experiencing on his Patient Active Problem List   Diagnosis    DKA (diabetic ketoacidoses) ([facility]    Coronary artery disease involving native heart with angina pectoris ([facility]    DM type 1 (diabetes mellitus, type 1) ([facility]    Acute kidney injury superimposed on chronic kidney disease (HCC)    PNA (pneumonia)    CRI (chronic renal insufficiency) ([facility])    CHF (congestive heart failure) ([facility])    Hypoglycemia    Diastolic HF (heart failure) ([facility])    Dyslipidemia    CKD (chronic kidney disease) stage 2, GFR 60-89 ml/min    Type 1 diabetes mellitus with chronic kidney disease ([facility]    Diabetic peripheral neuropathy ([facility])    Carotid artery stenosis    PVD (peripheral vascular disease) ([facility]    Diabetic nephropathy associated with type 1 diabetes mellitus ([facility]    Chest pain    Stenosis of right carotid artery    Carotid artery calcification    H/O carotid angioplasty    Pure hypercholesterolemia    Abnormal cardiovascular stress test    Acute encephalopathy    Generalized idiopathic epilepsy, not intractable, without status epilepticus ([facility]    Encephalopathy    NSTEMI (non-ST elevated myocardial infarction) ([facility]    HTN (hypertension), benign    S/P CABG (coronary artery bypass graft)    Ischemic cardiomyopathy    Venous insufficiency of both lower extremities    Atrial fibrillation with rapid ventricular response ([facility])    Syncope    Stage 4 chronic kidney disease ([facility])    NSVT (nonsustained ventricular tachycardia) ([facility]    DKA, type 1, not at goal [facility])    Acute on chronic combined systolic and diastolic CHF (congestive heart failure) ([facility]    Left leg weakness    Arterial ischemic stroke, ICA, right, acute ([facility]    DM (diabetes mellitus), type 2, uncontrolled with complications ([facility])    End-stage renal disease on peritoneal dialysis ([facility]    Hypersomnia    Moderate obstructive sleep apnea    Snoring                Vitals:  Weight BMI   Wt Readings from Last 3 Encounters:   03/24/21 194 lb (88 kg)   03/24/21 200 lb 8 oz (90.9 kg)   03/05/21 187 lb 6.3 oz (85 kg)    Body mass index is 29.5 kg/m².      BP HR SaO2   BP Readings from Last 3 Encounters:   03/24/21 128/80   03/24/21 (!) 142/80   03/05/21 109/68    Pulse Readings from Last 3 Encounters:   03/24/21 77   03/24/21 84   03/05/21 87    SpO2 Readings from Last 3 Encounters:   03/24/21 98%   03/24/21 99%   03/05/21 95%            Electronically signed by JAYLEN Boyd on 3/24/2021 at 3:16 PM

## 2021-03-24 NOTE — PROGRESS NOTES
lamr    [facility]     Outpatient Follow Up Note    CHIEF COMPLAINT / HPI: Hospital Follow Up secondary to [id number] [facility] [employer] record has been reviewed  Hospital Course progressed as follows per discharge summary:     admitted to hospital with resolving left leg weakness and left arm numbness. Patient had a carotid ultrasound performed which showed 50 to 70% stenosis of right internal carotid artery and less than 50% of the left. Seen by vascular recommended repeat US carotid in 6 months Patient had MRI of the brain performed which showed no acute infarct. Patient symptoms resolved and felt much better and patient was discharged home with outpatient PT. Neurology was seen and place patient on aspirin and Plavix for secondary prevention and statin.     Patient had chest pain, seend by cardiology, fellt troponin elevation secondary to ESRD, cleared for discharge per cardiology neurology and vascular      Mirnamargarita Marley is 50 y.o. male who presents today for a routine follow up after a recent hospitalization related to the above mentioned issues. He recalls getting up and couldn't feel his Lt leg and numbness in his Lt arm and hand. Subjective:   Since the time of discharge, the patient admits their symptoms have improved. His leg feels pretty normal since discharged 3 weeks ago. His Lt foot tended to point outward and has since resolved. He's continued to do the exercises that were given by physical therapy. He called last week with c/o CP and inst to increase imdur. Increasing isosorbide didn't make a major difference. He couldn't walk 20 yrds without having angina. So, he found out that he had pain when his BP was elevated as well as when controlled : 184/ and 125/68. He read that diabetics have experienced similar symptoms: sugars > 200. The past 2 days, his BS have been < 194 and has had no pain. He went outside and played with his dog. He was winded but had no pain.  He had been taking his insulin after meals instead of before. He now feels \"great\". It was 106 this am; 140 right now. He remains on peritoneal dialysis. He continues to void in good amts. He has edema in his legs and ankles. His wt stays around 190.5# (goal 195# set by NE). His appetite is coming back. He denies orthopnea/PND. He props to facilitate his dialysis. There is no  SOB/MOISE. The patient is not experiencing palpitations. He mentions that he'd gone out to eat and when leaving the restaurant, he had CP in his left side. Once he'd gotten home, he found a bruise around his left nipple. These symptoms are improving over the last many days. With regard to medication therapy the patient has been compliant with prescribed regimen. They have tolerated therapy to date.      Past Medical History:   Diagnosis Date    [facility]     Cardiomyopathy ([facility]     Carotid artery stenosis 10/25/2016    SUZANNA stented with 8 x 30 mm non-tapered Xact stent    CHF (congestive heart failure) ([facility] 3/3/15    EF 30%     CKD (chronic kidney disease) stage 2, GFR 60-89 ml/min     Coronary artery disease     sp CABG UC    Diabetic peripheral neuropathy [facility])     Diastolic HF (heart failure) ([facility])     Hyperlipidemia with target LDL less than 70     Hypertension goal BP (blood pressure) < 130/80     PVD (peripheral vascular disease) ([facility])     Seizures ([facility])     Type 1 diabetes mellitus with chronic kidney disease ([facility])     c-peptide <0.1 in 2015     Social History:    Social History     Tobacco Use   Smoking Status Never Smoker   Smokeless Tobacco Never Used     Current Medications:  Current Outpatient Medications   Medication Sig Dispense Refill    isosorbide mononitrate (IMDUR) 60 MG extended release tablet Take 1 tablet by mouth daily (Patient taking differently: Take 60 mg by mouth daily 2 tabs in the am) 90 tablet 0    nitroGLYCERIN (NITROSTAT) 0.4 MG SL tablet DISSOLVE 1 TAB UNDER TONGUE FOR CHEST PAIN - IF PAIN CARDIOVASCULAR: See HPI  GI: No nausea, vomiting, diarrhea, constipation, abdominal pain or changes in bowel habits. : No urinary frequency, urgency, incontinence hematuria or dysuria. SKIN: bruise left chest/nipple. MUSCULOSKELETAL: No new muscle or joint pain. NEUROLOGICAL: No syncope or TIA-like symptoms. PSYCHIATRIC: No anxiety, pain, insomnia or depression    Objective:   PHYSICAL EXAM:       Vitals:    03/24/21 1029 03/24/21 1112   BP: (!) 140/80 (!) 142/80   Site: Left Upper Arm Right Upper Arm   Position: Sitting    Cuff Size: Large Adult Large Adult   Pulse: 84    SpO2: 99%    Weight: 200 lb 8 oz (90.9 kg)    Height: 5' 7.5\" (1.715 m)         VITALS:  BP (!) 140/80 (Site: Left Upper Arm, Position: Sitting, Cuff Size: Large Adult)   Pulse 84   Ht 5' 7.5\" (1.715 m)   Wt 200 lb 8 oz (90.9 kg)   SpO2 99%   BMI 30.94 kg/m²     CONSTITUTIONAL: Cooperative, no apparent distress, and appears well nourished / developed  NEUROLOGIC:  Awake and orientated to person, place and time. PSYCH: Calm affect. SKIN: Warm and dry; peritoneal cath secured to abd; Lt breast with large ecchymosis. HEENT: Sclera non-icteric, normocephalic, neck supple, no elevation of JVP, normal carotid pulses with no bruits and thyroid normal size. LUNGS:  No increased work of breathing and clear to auscultation, no crackles or wheezing. CARDIOVASCULAR:  Regular rate 80 and rhythm with no murmurs, gallops, rubs, or abnormal heart sounds, normal PMI. The apical impulses not displaced.                                Heart tones are crisp and normal                                                                                            Cervical veins are not engorged                 JVP less than 8 cm H2O                                                                              The carotid upstroke is normal in amplitude and contour without delay or bruit    ABDOMEN:  Normal bowel sounds, non-distended and non-tender to palpation   EXT: kike LE pitting edema, no calf tenderness. Pulses are present bilaterally. DATA:    Lab Results   Component Value Date    ALT 9 (L) 03/02/2021    AST 10 (L) 03/02/2021    ALKPHOS 129 03/02/2021    BILITOT 0.3 03/02/2021     Lab Results   Component Value Date    CREATININE 6.9 (HH) 03/04/2021    BUN 57 (H) 03/04/2021     (L) 03/04/2021    K 4.9 03/04/2021    CL 94 (L) 03/04/2021    CO2 24 03/04/2021     Lab Results   Component Value Date    TSH 1.60 05/01/2020     Lab Results   Component Value Date    WBC 8.2 03/04/2021    HGB 14.9 03/04/2021    HCT 46.6 03/04/2021    MCV 87.5 03/04/2021     03/04/2021     No components found for: CHLPL  Lab Results   Component Value Date    TRIG 85 03/02/2021    TRIG 69 05/16/2020    TRIG 75 07/25/2019     Lab Results   Component Value Date    HDL 40 03/02/2021    HDL 47 05/16/2020    HDL 45 07/25/2019     Lab Results   Component Value Date    LDLCALC 153 (H) 03/02/2021    LDLCALC 54 05/16/2020    LDLCALC 125 (H) 07/25/2019     Lab Results   Component Value Date    LABVLDL 17 03/02/2021    LABVLDL 14 05/16/2020    LABVLDL 15 07/25/2019     Radiology Review:  Pertinent images / reports were reviewed as a part of this visit and reveals the following:    Cardiac cath 7/7/17:  ANGIOGRAPHIC FINDINGS:  1.  The left main comes from left coronary cusp; distally it gives rise to   left  circumflex which goes into the OM1.  Rest of the circumflex is occluded.  Left  anterior descending artery is occluded. 2.  Saphenous vein graft to obtuse marginal 2 is patent. 3.  Left internal mammary artery, left anterior descending artery is patent   and  provides blood to the diagonal branches. 4.  Right coronary artery comes from right coronary cusp.  The posterolateral   branch  has 90% stenosis present.  Rest of the vessel is patent.   5.  LVEDP was 15 mmHg.  There was no aortic valve gradient.     SUMMARY:  1.  Patent left internal mammary artery to left anterior descending artery. 2.  Patent saphenous vein graft to obtuse marginal 2.  3.  Non-occluded saphenous vein graft to the obtuse marginal.  4.  Right coronary artery in the posterolateral branch with 90% stenosis   present. 5.  Occluded left anterior descending artery in the proximal segment. 6.  Occluded mid left circumflex.     RECOMMENDATIONS:  1.  Continue postoperative care. 2.  Watch for bleeding. 3.  Aggressive risk factor modification. 4.  Continue aggressive antianginal therapy. 5.  If the patient remains symptomatic, we can consider intervention of right  posterolateral branch.     Right heart cath 5/1/2020  FINDINGS:  1.  Evidence of severe diastolic dysfunction with a pulmonary capillary  wedge pressure of 25.  Right atrial pressure is 5 mmHg. 2.  Pulmonary hypertension, likely pulmonary venous hypertension with an  instantaneous pressure of 38/14 for a mean pulmonary arterial pressure  of 27 mmHg. 3.  Preserved cardiac output by thermodilution at 6.05 liters per minute  for a cardiac index of 2.9 liters per kilogram per minute. 4.  Normal mixed venous oxygen saturations in the pulmonary artery at  74%, in the right atrium at 71% on room air.   Summary   -Normal left ventricle size.   -Overall left ventricular function is decreased with an ejection fraction in   the 40% range. There is hypokinesis of the inferior and lateral walls.   -Diastolic filling parameters suggest grade I diastolic dysfunction.   N/K=09.87.   -Mitral annular calcification is present.   -Mild mitral regurgitation. MRI brain: 3/2/21:     Impression   Mild chronic small vessel ischemic changes.  No acute brain parenchymal   abnormality.       Mucoperiosteal thickening of the sinuses as discussed above.         Last Echo: 3/2/21:  Summary   Definity contrast agent was used to help visualize endocardial borders. Left ventricular cavity size is moderately dilated.    Overall left ventricular systolic function appears severely reduced. Ejection fraction is visually estimated to be 30-35%. There is moderate hypokinesis of the basal-mid septal, basal-mid lateral,   basal-mid inferior and basal-mid inferolateral walls. Grade I diastolic dysfunction with normal LV filling pressures. Mild mitral regurgitation. A bubble study was performed and fails to show evidence of shunting. Compared to previous echo, LVEF now reduced     Carotid US: 3/2/21:   Summary        The right internal carotid artery demonstrates a patent stent, appears to   [facility] a upper limits 50-69% diameter reducing stenosis based on velocity    criteria.    The left internal carotid artery appears to have a <50% diameter reducing    stenosis based on velocity criteria.    Vertebral flow are antegrade bilaterally. Assessment:      Diagnosis Orders   1. Coronary artery disease due to lipid rich plaque   ~s/p PTCA with instent stenosis > s/p CABG '01  ~Rt PL medically managed unless becomes symptomatic   ~elevated troponin attributed to dialysis ; cath not appropriate at time of hospitalization d/t acute TIA  ~CP resolved : taking imdur / ranexa / amlodipine / carvedilol   ~plan for a NM once out further from TIA    2. Ischemic cardiomyopathy   ~moderate hypokinesis of the basal-mid septal, basal-mid lateral,   basal-mid inferior and basal-mid inferolateral walls; EF 30-35% by echo 3/2/21  ~HK inferior and lateral walls with echo from May '20, EF 40%    3. Arterial ischemic stroke, ICA, right, acute (HCC)   ~Lt sided weakness improving  ~followed by vascular surgeon for carotid stenosis  ~SUZANNA 51-02%, LICA < 99%; plans for surveillance US in six months  ~s/p PTA SUZANNA '16  ~DAPT / statin     4. HTN (hypertension), benign   ~reasonably controlled in office   ~reporting variable readings at home        Patient  is stable since hospital discharge.     Plan: continue imdur at 60 mg daily    ~plan for a myoview stress test   F/U in 3 months    I have addressed the patient's cardiac risk factors and adjusted pharmacologic treatment as needed. In addition, I have reinforced the need for patient directed risk factor modification. Further evaluation will be based upon the patient's clinical course and testing results. All questions and concerns were addressed to the patient. Alternatives to  treatment were discussed. The patient  currently  is not smoking. The risks related to smoking were reviewed with the patient. Recommend maintaining a smoke-free lifestyle. Daily weight, low sodium diet were discussed. Patient instructed to call the office with a weight gain: > 3 # over night or 5# in one week; swelling, SOB/orthopnea/PND    Dual Antiplatelet therapy has been recommended / prescribed for this patient. Education conducted on adverse reactions including bleeding was discussed. The patient verbalizes understanding. Pt is on a BB  Pt is on not an ace-i/ARB : ESRD > peritoneal dialysis (followed by Dr. Minerva Mcneill).  Taking hydralazine-isosorbide combination   Pt is on a statin      Saturated fat diet discussed  Exercise program discussed    Thank you for allowing to us to participate in the care of [facility]  Documentation of today's visit sent to PCP >> he will be establishing himself at [facility] with a PCP, Dr. Ky Mcallister and will be likely be seeing their cardiologist for up to one year after transplant

## 2021-03-25 ENCOUNTER — TELEPHONE (OUTPATIENT)
Dept: SLEEP CENTER | Age: 56
End: 2021-03-25

## 2021-03-26 ENCOUNTER — APPOINTMENT (OUTPATIENT)
Dept: OCCUPATIONAL THERAPY | Age: 56
End: 2021-03-26
Payer: COMMERCIAL

## 2021-03-26 ENCOUNTER — APPOINTMENT (OUTPATIENT)
Dept: PHYSICAL THERAPY | Age: 56
End: 2021-03-26
Payer: COMMERCIAL

## 2021-03-26 RX ORDER — BLOOD-GLUCOSE SENSOR
1 EACH MISCELLANEOUS DAILY
Qty: 1 EACH | Refills: 0 | Status: SHIPPED | OUTPATIENT
Start: 2021-03-26 | End: 2022-06-20 | Stop reason: SDUPTHER

## 2021-03-31 ENCOUNTER — HOSPITAL ENCOUNTER (OUTPATIENT)
Dept: OCCUPATIONAL THERAPY | Age: 56
Setting detail: THERAPIES SERIES
Discharge: HOME | End: 2021-03-31
Payer: COMMERCIAL

## 2021-03-31 ENCOUNTER — HOSPITAL ENCOUNTER (OUTPATIENT)
Dept: PHYSICAL THERAPY | Age: 56
Setting detail: THERAPIES SERIES
Discharge: HOME | End: 2021-03-31
Payer: COMMERCIAL

## 2021-03-31 NOTE — FLOWSHEET NOTE
[address] [facility]    Physical Therapy  Cancellation/No-show Note  Patient Name:  Edward Peters  :  1965   Date:  3/31/2021    Cancelled visits to date: 3  No-shows to date: 0    For today's appointment patient:  [x]  Cancelled 3/17, 3/19, 3/31  []  Rescheduled appointment  []  No-show     Reason given by patient:  []  Patient ill  []  Conflicting appointment  []  No transportation    []  Conflict with work  []  No reason given  [x]  Other: Too tired from being out of town this weekend    Comments:      Phone call information:   []  Phone call made today to patient at _ time at number provided:      []  Patient answered, conversation as follows:   **OT contact pt after his missed visit with them, states pt unaware of sessions on  and will be here for Friday's apt. []  Patient did not answer, message left as follows:   []  Phone call not made today  [x]  Phone call not needed - pt contacted us to cancel and provided reason for cancellation.      Electronically signed by:  Sean Smith, PT

## 2021-03-31 NOTE — PROGRESS NOTES
Occupational Therapy  Cancellation/No-show Note  Patient Name:  Ki John   :  1965   Date:  3/31/2021  Cancelled visits to date: 1  No-shows to date: 1    Patient status for today's appointment patient:  [x]  Cancelled - 3/31/21  []  Rescheduled appointment  []  No-show - 3/17/21     Reason given by patient:  []  Patient ill  []  Conflicting appointment  []  No transportation    []  Conflict with work  []  No reason given  [x]  Other:     Comments:  Pt reports he is too tired from being out of town this weekend to attend.      Phone call information:   []  Phone call made today to patient at number provided:      []  Patient answered, conversation as follows:     []  Patient did not answer, message left as follows:  [x]  Phone call not made today     Electronically signed by:  CANDELARIA Washburn/ZENA [id number]

## 2021-04-02 ENCOUNTER — HOSPITAL ENCOUNTER (OUTPATIENT)
Dept: OCCUPATIONAL THERAPY | Age: 56
Setting detail: THERAPIES SERIES
Discharge: HOME | End: 2021-04-02
Payer: COMMERCIAL

## 2021-04-02 ENCOUNTER — HOSPITAL ENCOUNTER (OUTPATIENT)
Dept: PHYSICAL THERAPY | Age: 56
Setting detail: THERAPIES SERIES
Discharge: HOME | End: 2021-04-02
Payer: COMMERCIAL

## 2021-04-02 NOTE — FLOWSHEET NOTE
[address] [facility]    Physical Therapy  Cancellation/No-show Note  Patient Name:  Vickie Yoo  :  1965   Date:  2021    Cancelled visits to date: 4  No-shows to date: 0    For today's appointment patient:  [x]  Cancelled 3/17, 3/19, 3/31,   []  Rescheduled appointment  []  No-show     Reason given by patient:  []  Patient ill  []  Conflicting appointment  []  No transportation    []  Conflict with work  []  No reason given  []  Other: Too tired from being out of town this weekend    Comments:      Phone call information:   [x]  Phone call made today to patient at _ time at number provided:      []  Patient answered, conversation as follows:   **OT contacted pt after his missed visit with them, states today is his birthday and doesn't plan to attend his sessions today. []  Patient did not answer, message left as follows:   []  Phone call not made today  []  Phone call not needed - pt contacted us to cancel and provided reason for cancellation.      Electronically signed by:  Miguelito Crain, PT

## 2021-04-07 ENCOUNTER — HOSPITAL ENCOUNTER (OUTPATIENT)
Dept: OCCUPATIONAL THERAPY | Age: 56
Setting detail: THERAPIES SERIES
Discharge: HOME | End: 2021-04-07
Payer: COMMERCIAL

## 2021-04-07 ENCOUNTER — OFFICE VISIT (OUTPATIENT)
Dept: ENDOCRINOLOGY | Age: 56
End: 2021-04-07
Payer: COMMERCIAL

## 2021-04-07 ENCOUNTER — HOSPITAL ENCOUNTER (OUTPATIENT)
Dept: PHYSICAL THERAPY | Age: 56
Setting detail: THERAPIES SERIES
Discharge: HOME | End: 2021-04-07
Payer: COMMERCIAL

## 2021-04-07 VITALS
OXYGEN SATURATION: 98 % | SYSTOLIC BLOOD PRESSURE: 123 MMHG | HEIGHT: 68 IN | WEIGHT: 200.6 LBS | DIASTOLIC BLOOD PRESSURE: 68 MMHG | BODY MASS INDEX: 30.4 KG/M2 | RESPIRATION RATE: 18 BRPM | HEART RATE: 75 BPM

## 2021-04-07 DIAGNOSIS — N18.4 TYPE 1 DIABETES MELLITUS WITH STAGE 4 CHRONIC KIDNEY DISEASE (HCC): Primary | ICD-10-CM

## 2021-04-07 DIAGNOSIS — Z96.41 INSULIN PUMP IN PLACE: ICD-10-CM

## 2021-04-07 DIAGNOSIS — E10.22 TYPE 1 DIABETES MELLITUS WITH STAGE 4 CHRONIC KIDNEY DISEASE (HCC): Primary | ICD-10-CM

## 2021-04-07 LAB — HBA1C MFR BLD: 7 %

## 2021-04-07 PROCEDURE — 3017F COLORECTAL CA SCREEN DOC REV: CPT | Performed by: INTERNAL MEDICINE

## 2021-04-07 PROCEDURE — 2022F DILAT RTA XM EVC RTNOPTHY: CPT | Performed by: INTERNAL MEDICINE

## 2021-04-07 PROCEDURE — 83036 HEMOGLOBIN GLYCOSYLATED A1C: CPT | Performed by: INTERNAL MEDICINE

## 2021-04-07 PROCEDURE — 99214 OFFICE O/P EST MOD 30 MIN: CPT | Performed by: INTERNAL MEDICINE

## 2021-04-07 PROCEDURE — 1036F TOBACCO NON-USER: CPT | Performed by: INTERNAL MEDICINE

## 2021-04-07 PROCEDURE — G8427 DOCREV CUR MEDS BY ELIG CLIN: HCPCS | Performed by: INTERNAL MEDICINE

## 2021-04-07 PROCEDURE — G8417 CALC BMI ABV UP PARAM F/U: HCPCS | Performed by: INTERNAL MEDICINE

## 2021-04-07 PROCEDURE — 95251 CONT GLUC MNTR ANALYSIS I&R: CPT | Performed by: INTERNAL MEDICINE

## 2021-04-07 PROCEDURE — 3052F HG A1C>EQUAL 8.0%<EQUAL 9.0%: CPT | Performed by: INTERNAL MEDICINE

## 2021-04-07 NOTE — PROGRESS NOTES
Seen as f/u patient for diabetes    Interim:     Will be evaluated for kidney / pancreas transplant  ESRD  On dialysis - PD  Back on pump    Reports had some bruising swelling in Pectoral area  Seen cardiology     ER visit for hypoglycemia- ate less  10/19 DKA- pump not working  States lost insurance  Has dexcom/t slim    Diagnosed with Type 1 diabetes mellitus since age 17    Known diabetic complications: Nephropathy, Retinopathy, Neuropathy  Severe, uncontrolled    Current diabetic medications        MN  0.925   4am  0.875   6am 0.95  10:30 am  0.85   1pm  0.925    3pm 0.925   6pm  0.975     CF   50  MN I :C  1:10   10:30  I: C  1: 9     Target 100    Previous:  Lantus 30 units HS  Humalog <140  No insulin  2 for 50 >150    Last A1c  7%<----7.7%<----10.3%<------11.5 % on <------ 8.4%<----8.2%<-----8% <------9% on <----8.3 on <------8.3 on 3/16<--- 9.0 on 8/15<------8.6 on 5/15<----- 9.8 on 3/15<---9.8<---10.0    Prior visit with dietician: None since Age 17  Current diet: on average, 5 meals per day not familiar with CHO counting, seeing dietician, eats a snack at night as awake/has insomnia  Current exercise: walking   Current monitoring regimen: home blood tests -6-7  times daily     Has brought blood glucose log/meter: yes  Home blood sugar records:  Dexcom download    Average 180  Overnight highs likely due to PD  Occ lows    Any episodes of hypoglycemia? 58  Has brittle DM    H/o CAD s/p CABG     Hyperlipidemia:controlled, on statin   LDL 71 on 3/15  crestor 40mg  LDL 54 on   Last eye exam:   Last foot exam:   Last microalbumin to creatinine ratio:ESRD    HTN: for years, uncontrolled  ACE-I or ARB:  coreg 50/  Hydralazine 100mg TID  norvasc 5mg  LAURA Ab 25 H    FH: Mom- CAD  Dad  DM, CAD  at 61 age  [de-identified] had MS,  in 46s    Past Medical History:   Diagnosis Date    [facility]     Cardiomyopathy ([facility]     Carotid artery stenosis 10/25/2016    SUZANNA stented with 8 x 30 mm non-tapered Xact stent    CHF (congestive [facility] failure) ([facility] 3/3/15    EF 30%     CKD (chronic kidney disease) stage 2, GFR 60-89 ml/min     Coronary artery disease     sp CABG UC    Diabetic peripheral neuropathy (HCC)     Diastolic HF (heart failure) (HCC)     Hyperlipidemia with target LDL less than 70     Hypertension goal BP (blood pressure) < 130/80     PVD (peripheral vascular disease) (HCC)     Seizures (HCC)     Type 1 diabetes mellitus with chronic kidney disease (HCC)     c-peptide <0.1 in 2015     Past Surgical History:   Procedure Laterality Date    CARDIAC CATHETERIZATION      CARDIAC SURGERY      triple bypass    CORONARY ARTERY BYPASS GRAFT      HERNIA REPAIR      LAPAROSCOPY INSERTION PERITONEAL CATHETER N/A 6/3/2020    LAPAROSCOPIC PLACEMENT OF PERITONEAL DIALYSIS  CATHETER performed by Agatha Bowles MD at [address] [facility]       Current Outpatient Medications   Medication Sig Dispense Refill    Continuous Blood Gluc Sensor (DEXCOM G6 SENSOR) MISC 1 Device by Does not apply route daily 1 each 0    hydrALAZINE (APRESOLINE) 100 MG tablet TAKE ONE TABLET BY MOUTH THREE TIMES A  tablet 2    nitroGLYCERIN (NITROSTAT) 0.4 MG SL tablet DISSOLVE 1 TAB UNDER TONGUE FOR CHEST PAIN - IF PAIN REMAINS AFTER 5 MIN, CALL 911 AND REPEAT DOSE.  MAX 3 TABS IN 15 MINUTES 25 tablet 1    isosorbide mononitrate (IMDUR) 60 MG extended release tablet Take 1 tablet by mouth daily (Patient taking differently: Take 60 mg by mouth daily 2 tabs in the am) 90 tablet 0    aspirin 81 MG EC tablet Take 1 tablet by mouth daily 30 tablet 3    clopidogrel (PLAVIX) 75 MG tablet Take 1 tablet by mouth daily 30 tablet 3    rosuvastatin (CRESTOR) 40 MG tablet Take 1 tablet by mouth nightly 30 tablet 3    Cholecalciferol (VITAMIN D3 ULTRA POTENCY) 1.25 MG (08380 UT) TABS Take 50,000 Units by mouth once a week (on Saturdays)      calcitRIOL (ROCALTROL) 0.25 MCG capsule Take 0.25 mcg by mouth daily      insulin aspart (NOVOLOG FLEXPEN) 100 UNIT/ML injection pen Inject 0-7 Units into the skin 3 times daily (before meals) Per patient sliding scale.  carvedilol (COREG) 25 MG tablet TAKE TWO TABLETS BY MOUTH EVERY MORNING AND TAKE ONE TABLET BY MOUTH EVERY EVENING 90 tablet 5    ranolazine (RANEXA) 500 MG extended release tablet TAKE ONE TABLET BY MOUTH TWICE A DAY 60 tablet 2    blood glucose test strips (ACCU-CHEK GUIDE) strip TEST BLOOD SUGAR 6 TIMES A  strip 5    amLODIPine (NORVASC) 5 MG tablet Take 5 mg by mouth 2 times daily      furosemide (LASIX) 80 MG tablet Take 80 mg by mouth 2 times daily      EPINEPHrine (EPIPEN 2-JESSICA) 0.3 MG/0.3ML SOAJ injection Inject 0.3 mLs into the skin once for 1 dose Use as directed for allergic reaction 0.3 mL 0    Methylcobalamin (B-12) 1000 MCG TBDP Place 1,000 mcg under the tongue daily       No current facility-administered medications for this visit. Review of Systems      Scanned, reviewed      Vitals:    04/07/21 1113   BP: 123/68   Pulse: 75   Resp: 18   SpO2: 98%       Constitutional: Well-developed, appears stated age, cooperative, in no acute distress  H/E/N/M/T:atraumatic, normocephalic, external ears, nose, lips normal without lesions  Eyes: Lids, lashes, conjunctivae and sclerae normal, No proptosis, no redness  Neck: supple, symmetrical, no swelling  Skin: No obvious rashes or lesions present.   Skin and hair texture normal  Psychiatric: Judgement and Insight:  judgement and insight appear normal  Neuro: Normal without focal findings, speech is normal normal, speech is spontaneous  Chest: No labored breathing, no chest deformity, no stridor  Musculoskeletal: No joint deformity, swelling      2/21  Skeletal foot exam is normal, no skin lesions, toenails are normal,10 g monofilament is decreased    Lab Reviewed   No components found for: CHLPL  Lab Results   Component Value Date    TRIG 85 03/02/2021    TRIG 69 05/16/2020    TRIG 75 07/25/2019     Lab Results   Component Value Date    HDL 40 03/02/2021    HDL 47 05/16/2020    HDL 45 07/25/2019     Lab Results   Component Value Date    LDLCALC 153 (H) 03/02/2021    LDLCALC 54 05/16/2020    LDLCALC 125 (H) 07/25/2019     Lab Results   Component Value Date    LABVLDL 17 03/02/2021    LABVLDL 14 05/16/2020    LABVLDL 15 07/25/2019     Lab Results   Component Value Date    LABA1C 8.1 03/02/2021       Assessment:     Annika Kilgore is a 49 y.o. male with :    1.T1DM:Longstanding, uncontrolled, has complications. Saw educator. Started on insulin pump/sensor. Needs to bolus regularly. Advise to bolus after meal if not sure of intake. Goal 7-8%  Does have T slim with Dexcom with basal IQ to help prevent lows. Highs since on PD. Increase basal overnight  2. HTN: BP at goal  3. HLD: LDL at goal  4. Obesity  5. CKD: ESRD, on PD  6. Neuropathy  7. CAD: S/p CABG  8. CHF: EF 30%  9. PVD: sees Dr. Zully Bhatt  10. KELLY: Right 90% occlusion     Plan:      MN  0.925---> 0.95   4am  0.875   6am 0.95  10:30 am  0.85   1pm  0.925    3pm 0.925   6pm  0.975---> 1.0     Advise to check blood sugar 4-6 times a day   Patient to send blood sugar log for titration. Advise to low simple carbohydrate and protein with each  meal diet. Diabetes Care: recommend yearly eye exam, foot exam and urine microalbumin to   creatinine ratio.  Patient is up-to-date.       -Hyperlipidemia, LDL goal is  <70 mg/dl.   -Hypertension: follow with nephrology  -Daily ASA: 81mg daily  -Smoking status: Non smoker

## 2021-04-08 ENCOUNTER — APPOINTMENT (OUTPATIENT)
Dept: GENERAL RADIOLOGY | Age: 56
DRG: 246 | End: 2021-04-08
Payer: COMMERCIAL

## 2021-04-08 ENCOUNTER — HOSPITAL ENCOUNTER (INPATIENT)
Age: 56
LOS: 2 days | Discharge: HOME | DRG: 246 | End: 2021-04-10
Attending: INTERNAL MEDICINE | Admitting: INTERNAL MEDICINE
Payer: COMMERCIAL

## 2021-04-08 ENCOUNTER — HOSPITAL ENCOUNTER (OUTPATIENT)
Dept: NON INVASIVE DIAGNOSTICS | Age: 56
Discharge: HOME | End: 2021-04-08
Payer: COMMERCIAL

## 2021-04-08 DIAGNOSIS — I21.4 NSTEMI (NON-ST ELEVATED MYOCARDIAL INFARCTION) (HCC): Primary | ICD-10-CM

## 2021-04-08 PROBLEM — I20.0 UNSTABLE ANGINA (HCC): Status: ACTIVE | Noted: 2021-04-08

## 2021-04-08 PROBLEM — N18.6 ESRD ON PERITONEAL DIALYSIS (HCC): Status: ACTIVE | Noted: 2021-04-08

## 2021-04-08 PROBLEM — Z99.2 ESRD ON PERITONEAL DIALYSIS (HCC): Status: ACTIVE | Noted: 2021-04-08

## 2021-04-08 LAB
A/G RATIO: 1.3 (ref 1.1–2.2)
ALBUMIN SERPL-MCNC: 4.1 G/DL (ref 3.4–5)
ALP BLD-CCNC: 111 U/L (ref 40–129)
ALT SERPL-CCNC: 13 U/L (ref 10–40)
ANION GAP SERPL CALCULATED.3IONS-SCNC: 11 MMOL/L (ref 3–16)
APTT: 34.9 SEC (ref 24.2–36.2)
AST SERPL-CCNC: 12 U/L (ref 15–37)
BASOPHILS ABSOLUTE: 0 K/UL (ref 0–0.2)
BASOPHILS RELATIVE PERCENT: 0.7 %
BILIRUB SERPL-MCNC: <0.2 MG/DL (ref 0–1)
BUN BLDV-MCNC: 53 MG/DL (ref 7–20)
CALCIUM SERPL-MCNC: 8.2 MG/DL (ref 8.3–10.6)
CHLORIDE BLD-SCNC: 99 MMOL/L (ref 99–110)
CO2: 28 MMOL/L (ref 21–32)
CREAT SERPL-MCNC: 7.7 MG/DL (ref 0.9–1.3)
EKG ATRIAL RATE: 70 BPM
EKG DIAGNOSIS: NORMAL
EKG P AXIS: 70 DEGREES
EKG P-R INTERVAL: 136 MS
EKG Q-T INTERVAL: 440 MS
EKG QRS DURATION: 100 MS
EKG QTC CALCULATION (BAZETT): 475 MS
EKG R AXIS: 70 DEGREES
EKG T AXIS: 116 DEGREES
EKG VENTRICULAR RATE: 70 BPM
EOSINOPHILS ABSOLUTE: 0.2 K/UL (ref 0–0.6)
EOSINOPHILS RELATIVE PERCENT: 3.9 %
GFR AFRICAN AMERICAN: 9
GFR NON-AFRICAN AMERICAN: 7
GLOBULIN: 3.2 G/DL
GLUCOSE BLD-MCNC: 167 MG/DL (ref 70–99)
HCT VFR BLD CALC: 45.6 % (ref 40.5–52.5)
HEMOGLOBIN: 14.5 G/DL (ref 13.5–17.5)
LV EF: 45 %
LVEF MODALITY: NORMAL
LYMPHOCYTES ABSOLUTE: 1.1 K/UL (ref 1–5.1)
LYMPHOCYTES RELATIVE PERCENT: 21 %
MCH RBC QN AUTO: 28.3 PG (ref 26–34)
MCHC RBC AUTO-ENTMCNC: 31.9 G/DL (ref 31–36)
MCV RBC AUTO: 88.8 FL (ref 80–100)
MONOCYTES ABSOLUTE: 0.7 K/UL (ref 0–1.3)
MONOCYTES RELATIVE PERCENT: 13 %
NEUTROPHILS ABSOLUTE: 3.1 K/UL (ref 1.7–7.7)
NEUTROPHILS RELATIVE PERCENT: 61.4 %
PDW BLD-RTO: 15.4 % (ref 12.4–15.4)
PLATELET # BLD: 147 K/UL (ref 135–450)
PMV BLD AUTO: 10.2 FL (ref 5–10.5)
POTASSIUM REFLEX MAGNESIUM: 3.9 MMOL/L (ref 3.5–5.1)
RBC # BLD: 5.14 M/UL (ref 4.2–5.9)
SODIUM BLD-SCNC: 138 MMOL/L (ref 136–145)
TOTAL PROTEIN: 7.3 G/DL (ref 6.4–8.2)
TROPONIN: 0.17 NG/ML
TROPONIN: 0.19 NG/ML
WBC # BLD: 5 K/UL (ref 4–11)

## 2021-04-08 PROCEDURE — 3430000000 HC RX DIAGNOSTIC RADIOPHARMACEUTICAL: Performed by: INTERNAL MEDICINE

## 2021-04-08 PROCEDURE — 71045 X-RAY EXAM CHEST 1 VIEW: CPT

## 2021-04-08 PROCEDURE — 36415 COLL VENOUS BLD VENIPUNCTURE: CPT

## 2021-04-08 PROCEDURE — 6370000000 HC RX 637 (ALT 250 FOR IP): Performed by: INTERNAL MEDICINE

## 2021-04-08 PROCEDURE — 93017 CV STRESS TEST TRACING ONLY: CPT | Performed by: INTERNAL MEDICINE

## 2021-04-08 PROCEDURE — 93010 ELECTROCARDIOGRAM REPORT: CPT | Performed by: INTERNAL MEDICINE

## 2021-04-08 PROCEDURE — 6360000002 HC RX W HCPCS: Performed by: INTERNAL MEDICINE

## 2021-04-08 PROCEDURE — 85025 COMPLETE CBC W/AUTO DIFF WBC: CPT

## 2021-04-08 PROCEDURE — 78452 HT MUSCLE IMAGE SPECT MULT: CPT | Performed by: INTERNAL MEDICINE

## 2021-04-08 PROCEDURE — 84484 ASSAY OF TROPONIN QUANT: CPT

## 2021-04-08 PROCEDURE — A9502 TC99M TETROFOSMIN: HCPCS | Performed by: INTERNAL MEDICINE

## 2021-04-08 PROCEDURE — 6360000002 HC RX W HCPCS: Performed by: PHYSICIAN ASSISTANT

## 2021-04-08 PROCEDURE — 93005 ELECTROCARDIOGRAM TRACING: CPT | Performed by: EMERGENCY MEDICINE

## 2021-04-08 PROCEDURE — 80053 COMPREHEN METABOLIC PANEL: CPT

## 2021-04-08 PROCEDURE — 90945 DIALYSIS ONE EVALUATION: CPT

## 2021-04-08 PROCEDURE — 85730 THROMBOPLASTIN TIME PARTIAL: CPT

## 2021-04-08 PROCEDURE — 2000000000 HC ICU R&B

## 2021-04-08 PROCEDURE — 6370000000 HC RX 637 (ALT 250 FOR IP): Performed by: PHYSICIAN ASSISTANT

## 2021-04-08 PROCEDURE — 94760 N-INVAS EAR/PLS OXIMETRY 1: CPT

## 2021-04-08 PROCEDURE — 99283 EMERGENCY DEPT VISIT LOW MDM: CPT

## 2021-04-08 RX ORDER — HEPARIN SODIUM 1000 [USP'U]/ML
2000 INJECTION, SOLUTION INTRAVENOUS; SUBCUTANEOUS PRN
Status: DISCONTINUED | OUTPATIENT
Start: 2021-04-08 | End: 2021-04-10 | Stop reason: HOSPADM

## 2021-04-08 RX ORDER — SODIUM CHLORIDE, SODIUM LACTATE, CALCIUM CHLORIDE, MAGNESIUM CHLORIDE AND DEXTROSE 2.5; 538; 448; 18.3; 5.08 G/100ML; MG/100ML; MG/100ML; MG/100ML; MG/100ML
2300 INJECTION, SOLUTION INTRAPERITONEAL EVERY 6 HOURS
Status: DISCONTINUED | OUTPATIENT
Start: 2021-04-08 | End: 2021-04-08 | Stop reason: SDUPTHER

## 2021-04-08 RX ORDER — FUROSEMIDE 40 MG/1
80 TABLET ORAL 2 TIMES DAILY
Status: DISCONTINUED | OUTPATIENT
Start: 2021-04-08 | End: 2021-04-10 | Stop reason: HOSPADM

## 2021-04-08 RX ORDER — CARVEDILOL 25 MG/1
50 TABLET ORAL
Status: DISCONTINUED | OUTPATIENT
Start: 2021-04-09 | End: 2021-04-10 | Stop reason: HOSPADM

## 2021-04-08 RX ORDER — ASPIRIN 81 MG/1
81 TABLET ORAL DAILY
Status: DISCONTINUED | OUTPATIENT
Start: 2021-04-08 | End: 2021-04-08 | Stop reason: SDUPTHER

## 2021-04-08 RX ORDER — ISOSORBIDE MONONITRATE 60 MG/1
60 TABLET, EXTENDED RELEASE ORAL DAILY
Status: DISCONTINUED | OUTPATIENT
Start: 2021-04-09 | End: 2021-04-10 | Stop reason: HOSPADM

## 2021-04-08 RX ORDER — ASPIRIN 81 MG/1
162 TABLET, CHEWABLE ORAL ONCE
Status: COMPLETED | OUTPATIENT
Start: 2021-04-08 | End: 2021-04-08

## 2021-04-08 RX ORDER — ACETAMINOPHEN 650 MG/1
650 SUPPOSITORY RECTAL EVERY 6 HOURS PRN
Status: DISCONTINUED | OUTPATIENT
Start: 2021-04-08 | End: 2021-04-10 | Stop reason: HOSPADM

## 2021-04-08 RX ORDER — SODIUM CHLORIDE 0.9 % (FLUSH) 0.9 %
5-40 SYRINGE (ML) INJECTION EVERY 12 HOURS SCHEDULED
Status: DISCONTINUED | OUTPATIENT
Start: 2021-04-08 | End: 2021-04-10 | Stop reason: HOSPADM

## 2021-04-08 RX ORDER — SODIUM CHLORIDE, SODIUM LACTATE, CALCIUM CHLORIDE, MAGNESIUM CHLORIDE AND DEXTROSE 2.5; 538; 448; 18.3; 5.08 G/100ML; MG/100ML; MG/100ML; MG/100ML; MG/100ML
23000 INJECTION, SOLUTION INTRAPERITONEAL EVERY 6 HOURS
Status: DISCONTINUED | OUTPATIENT
Start: 2021-04-08 | End: 2021-04-08 | Stop reason: SDUPTHER

## 2021-04-08 RX ORDER — ROSUVASTATIN CALCIUM 40 MG/1
40 TABLET, COATED ORAL NIGHTLY
Status: DISCONTINUED | OUTPATIENT
Start: 2021-04-08 | End: 2021-04-10 | Stop reason: HOSPADM

## 2021-04-08 RX ORDER — AMLODIPINE BESYLATE 5 MG/1
5 TABLET ORAL 2 TIMES DAILY
Status: DISCONTINUED | OUTPATIENT
Start: 2021-04-08 | End: 2021-04-10 | Stop reason: HOSPADM

## 2021-04-08 RX ORDER — HEPARIN SODIUM 1000 [USP'U]/ML
4000 INJECTION, SOLUTION INTRAVENOUS; SUBCUTANEOUS ONCE
Status: COMPLETED | OUTPATIENT
Start: 2021-04-08 | End: 2021-04-08

## 2021-04-08 RX ORDER — HYDRALAZINE HYDROCHLORIDE 100 MG/1
100 TABLET, FILM COATED ORAL EVERY 8 HOURS SCHEDULED
Status: DISCONTINUED | OUTPATIENT
Start: 2021-04-08 | End: 2021-04-09

## 2021-04-08 RX ORDER — CALCITRIOL 0.25 UG/1
0.25 CAPSULE, LIQUID FILLED ORAL DAILY
Status: DISCONTINUED | OUTPATIENT
Start: 2021-04-09 | End: 2021-04-10 | Stop reason: HOSPADM

## 2021-04-08 RX ORDER — SODIUM CHLORIDE 0.9 % (FLUSH) 0.9 %
5-40 SYRINGE (ML) INJECTION PRN
Status: DISCONTINUED | OUTPATIENT
Start: 2021-04-08 | End: 2021-04-10 | Stop reason: HOSPADM

## 2021-04-08 RX ORDER — AMINOPHYLLINE DIHYDRATE 25 MG/ML
100 INJECTION, SOLUTION INTRAVENOUS ONCE
Status: COMPLETED | OUTPATIENT
Start: 2021-04-08 | End: 2021-04-08

## 2021-04-08 RX ORDER — RANOLAZINE 500 MG/1
500 TABLET, EXTENDED RELEASE ORAL 2 TIMES DAILY
Status: DISCONTINUED | OUTPATIENT
Start: 2021-04-08 | End: 2021-04-10 | Stop reason: HOSPADM

## 2021-04-08 RX ORDER — CARVEDILOL 25 MG/1
25 TABLET ORAL
Status: DISCONTINUED | OUTPATIENT
Start: 2021-04-08 | End: 2021-04-10 | Stop reason: HOSPADM

## 2021-04-08 RX ORDER — GENTAMICIN SULFATE 1 MG/G
CREAM TOPICAL DAILY
Status: DISCONTINUED | OUTPATIENT
Start: 2021-04-08 | End: 2021-04-10 | Stop reason: HOSPADM

## 2021-04-08 RX ORDER — ONDANSETRON 2 MG/ML
4 INJECTION INTRAMUSCULAR; INTRAVENOUS EVERY 6 HOURS PRN
Status: DISCONTINUED | OUTPATIENT
Start: 2021-04-08 | End: 2021-04-10 | Stop reason: HOSPADM

## 2021-04-08 RX ORDER — HEPARIN SODIUM 1000 [USP'U]/ML
4000 INJECTION, SOLUTION INTRAVENOUS; SUBCUTANEOUS PRN
Status: DISCONTINUED | OUTPATIENT
Start: 2021-04-08 | End: 2021-04-10 | Stop reason: HOSPADM

## 2021-04-08 RX ORDER — SODIUM CHLORIDE, SODIUM LACTATE, CALCIUM CHLORIDE, MAGNESIUM CHLORIDE AND DEXTROSE 2.5; 538; 448; 18.3; 5.08 G/100ML; MG/100ML; MG/100ML; MG/100ML; MG/100ML
2300 INJECTION, SOLUTION INTRAPERITONEAL EVERY 6 HOURS
Status: DISCONTINUED | OUTPATIENT
Start: 2021-04-08 | End: 2021-04-10 | Stop reason: HOSPADM

## 2021-04-08 RX ORDER — CLOPIDOGREL BISULFATE 75 MG/1
75 TABLET ORAL DAILY
Status: DISCONTINUED | OUTPATIENT
Start: 2021-04-09 | End: 2021-04-10 | Stop reason: HOSPADM

## 2021-04-08 RX ORDER — PROMETHAZINE HYDROCHLORIDE 25 MG/1
12.5 TABLET ORAL EVERY 6 HOURS PRN
Status: DISCONTINUED | OUTPATIENT
Start: 2021-04-08 | End: 2021-04-10 | Stop reason: HOSPADM

## 2021-04-08 RX ORDER — ACETAMINOPHEN 325 MG/1
650 TABLET ORAL EVERY 6 HOURS PRN
Status: DISCONTINUED | OUTPATIENT
Start: 2021-04-08 | End: 2021-04-10 | Stop reason: HOSPADM

## 2021-04-08 RX ORDER — HEPARIN SODIUM 10000 [USP'U]/100ML
1000 INJECTION, SOLUTION INTRAVENOUS CONTINUOUS
Status: DISCONTINUED | OUTPATIENT
Start: 2021-04-08 | End: 2021-04-10 | Stop reason: HOSPADM

## 2021-04-08 RX ORDER — POLYETHYLENE GLYCOL 3350 17 G/17G
17 POWDER, FOR SOLUTION ORAL DAILY PRN
Status: DISCONTINUED | OUTPATIENT
Start: 2021-04-08 | End: 2021-04-10 | Stop reason: HOSPADM

## 2021-04-08 RX ORDER — NITROGLYCERIN 0.4 MG/1
0.4 TABLET SUBLINGUAL EVERY 5 MIN PRN
Status: DISCONTINUED | OUTPATIENT
Start: 2021-04-08 | End: 2021-04-10 | Stop reason: HOSPADM

## 2021-04-08 RX ORDER — SODIUM CHLORIDE 9 MG/ML
25 INJECTION, SOLUTION INTRAVENOUS PRN
Status: DISCONTINUED | OUTPATIENT
Start: 2021-04-08 | End: 2021-04-10 | Stop reason: HOSPADM

## 2021-04-08 RX ORDER — ASPIRIN 81 MG/1
81 TABLET, CHEWABLE ORAL DAILY
Status: DISCONTINUED | OUTPATIENT
Start: 2021-04-09 | End: 2021-04-10 | Stop reason: HOSPADM

## 2021-04-08 RX ADMIN — ROSUVASTATIN CALCIUM 40 MG: 40 TABLET, FILM COATED ORAL at 20:31

## 2021-04-08 RX ADMIN — REGADENOSON 0.4 MG: 0.08 INJECTION, SOLUTION INTRAVENOUS at 11:36

## 2021-04-08 RX ADMIN — ASPIRIN 162 MG: 81 TABLET, CHEWABLE ORAL at 13:41

## 2021-04-08 RX ADMIN — AMLODIPINE BESYLATE 5 MG: 5 TABLET ORAL at 20:31

## 2021-04-08 RX ADMIN — CARVEDILOL 25 MG: 25 TABLET, FILM COATED ORAL at 20:31

## 2021-04-08 RX ADMIN — HEPARIN SODIUM 4000 UNITS: 1000 INJECTION INTRAVENOUS; SUBCUTANEOUS at 17:25

## 2021-04-08 RX ADMIN — HEPARIN SODIUM 1000 UNITS/HR: 10000 INJECTION, SOLUTION INTRAVENOUS at 17:25

## 2021-04-08 RX ADMIN — GENTAMICIN SULFATE: 1 CREAM TOPICAL at 20:31

## 2021-04-08 RX ADMIN — FUROSEMIDE 80 MG: 40 TABLET ORAL at 20:29

## 2021-04-08 RX ADMIN — TETROFOSMIN 30 MILLICURIE: 1.38 INJECTION, POWDER, LYOPHILIZED, FOR SOLUTION INTRAVENOUS at 11:39

## 2021-04-08 RX ADMIN — HYDRALAZINE HYDROCHLORIDE 100 MG: 100 TABLET, FILM COATED ORAL at 20:30

## 2021-04-08 RX ADMIN — RANOLAZINE 500 MG: 500 TABLET, FILM COATED, EXTENDED RELEASE ORAL at 20:30

## 2021-04-08 RX ADMIN — AMINOPHYLLINE 100 MG: 25 INJECTION, SOLUTION INTRAVENOUS at 11:46

## 2021-04-08 RX ADMIN — TETROFOSMIN 10 MILLICURIE: 1.38 INJECTION, POWDER, LYOPHILIZED, FOR SOLUTION INTRAVENOUS at 10:28

## 2021-04-08 ASSESSMENT — HEART SCORE: ECG: 1

## 2021-04-08 NOTE — CONSULTS
Nephrology Consult Note  [phone]   http://[facility].        Reason for Consult:  ESKD    HISTORY OF PRESENT ILLNESS:                This is a patient with significant past medical history of ESKD on CCPD, T1DM, HTN, CAD s/p CABG, CHF, PVD who presented to ER for chest pain. Pt had a stress test today. He experienced chest pain during stress portion of exam.  There is reversible defect noted. He reports progressively worsening symptoms for the past few months, now having pain at rest.  He denies any SOB, fever/chills/N/V. Does have chronic BLE that is stable. He reports PD is going well.    -Tp is positive-started on heparin      Past Medical History:        Diagnosis Date    Angina at rest ([facility]     Cardiomyopathy ([facility]     Carotid artery stenosis 10/25/2016    SUZANNA stented with 8 x 30 mm non-tapered Xact stent    CHF (congestive heart failure) ([facility] [address] 3/3/15    EF 30%     CKD (chronic kidney disease) stage 2, GFR 60-89 ml/min     Coronary artery disease     sp CABG UC    Diabetic peripheral neuropathy (HCC)     Diastolic HF (heart failure) (HCC)     Hyperlipidemia with target LDL less than 70     Hypertension goal BP (blood pressure) < 130/80     PVD (peripheral vascular disease) (HCC)     Seizures (HCC)     Type 1 diabetes mellitus with chronic kidney disease (HCC)     c-peptide <0.1 in 2015       Past Surgical History:        Procedure Laterality Date    CARDIAC CATHETERIZATION      CARDIAC SURGERY      triple bypass    CORONARY ARTERY BYPASS GRAFT      HERNIA REPAIR      LAPAROSCOPY INSERTION PERITONEAL CATHETER N/A 6/3/2020    LAPAROSCOPIC PLACEMENT OF PERITONEAL DIALYSIS  CATHETER performed by Sandra Pate MD at [facility]         Current Medications:    No current facility-administered medications on file prior to encounter.       Current Outpatient Medications on File Prior to Encounter   Medication Sig Dispense Refill    Continuous Blood Gluc Sensor (DEXCOM G6 SENSOR) MISC 1 Device by Does not apply route daily 1 each 0    hydrALAZINE (APRESOLINE) 100 MG tablet TAKE ONE TABLET BY MOUTH THREE TIMES A  tablet 2    nitroGLYCERIN (NITROSTAT) 0.4 MG SL tablet DISSOLVE 1 TAB UNDER TONGUE FOR CHEST PAIN - IF PAIN REMAINS AFTER 5 MIN, CALL 911 AND REPEAT DOSE. MAX 3 TABS IN 15 MINUTES 25 tablet 1    isosorbide mononitrate (IMDUR) 60 MG extended release tablet Take 1 tablet by mouth daily (Patient taking differently: Take 60 mg by mouth daily 2 tabs in the am) 90 tablet 0    aspirin 81 MG EC tablet Take 1 tablet by mouth daily 30 tablet 3    clopidogrel (PLAVIX) 75 MG tablet Take 1 tablet by mouth daily 30 tablet 3    rosuvastatin (CRESTOR) 40 MG tablet Take 1 tablet by mouth nightly 30 tablet 3    Cholecalciferol (VITAMIN D3 ULTRA POTENCY) 1.25 MG (46872 UT) TABS Take 50,000 Units by mouth once a week (on Saturdays)      calcitRIOL (ROCALTROL) 0.25 MCG capsule Take 0.25 mcg by mouth daily      carvedilol (COREG) 25 MG tablet TAKE TWO TABLETS BY MOUTH EVERY MORNING AND TAKE ONE TABLET BY MOUTH EVERY EVENING 90 tablet 5    ranolazine (RANEXA) 500 MG extended release tablet TAKE ONE TABLET BY MOUTH TWICE A DAY 60 tablet 2    blood glucose test strips (ACCU-CHEK GUIDE) strip TEST BLOOD SUGAR 6 TIMES A  strip 5    amLODIPine (NORVASC) 5 MG tablet Take 5 mg by mouth 2 times daily      furosemide (LASIX) 80 MG tablet Take 80 mg by mouth 2 times daily      EPINEPHrine (EPIPEN 2-JESSICA) 0.3 MG/0.3ML SOAJ injection Inject 0.3 mLs into the skin once for 1 dose Use as directed for allergic reaction 0.3 mL 0    Methylcobalamin (B-12) 1000 MCG TBDP Place 1,000 mcg under the tongue daily      insulin aspart (NOVOLOG FLEXPEN) 100 UNIT/ML injection pen Inject 0-7 Units into the skin 3 times daily (before meals) Per patient sliding scale. Allergies:   Iodides, Shellfish-derived products, and Atorvastatin calcium    Social History:    Social History Socioeconomic History    Marital status: Legally      Spouse name: Not on file    Number of children: 1    Years of education: Not on file    Highest education level: Not on file   Occupational History    Occupation: IT   Social Needs    Financial resource strain: Not on file    Food insecurity     Worry: Not on file     Inability: Not on file   [employer] needs     Medical: Not on file     Non-medical: Not on file   Tobacco Use    Smoking status: Never Smoker    Smokeless tobacco: Never Used   Substance and Sexual Activity    Alcohol use: Yes     Comment: one drink a month    Drug use: No    Sexual activity: Yes     Partners: Female   Lifestyle    Physical activity     Days per week: Not on file     Minutes per session: Not on file    Stress: Not on file   Relationships    Social connections     Talks on phone: Not on file     Gets together: Not on file     Attends [employer] witness service: Not on file     Active member of club or organization: Not on file     Attends meetings of clubs or organizations: Not on file     Relationship status: Not on file    Intimate partner violence     Fear of current or ex partner: Not on file     Emotionally abused: Not on file     Physically abused: Not on file     Forced sexual activity: Not on file   Other Topics Concern    Not on file   Social History Narrative    Not on file       Family History:       Problem Relation Age of Onset    Coronary Art Dis Father     Diabetes Father     Coronary Art Dis Paternal Grandmother     Diabetes Paternal Grandmother     Hypertension Mother     Other Mother         Epilepsy    Other Sister         Thyroid disease  Heart murmur    Mult Sclerosis Brother     Arthritis Brother     Sleep Apnea Brother          Review of Systems:  a comprehensive Review of systems was negative except for the following: chest pain    PHYSICAL EXAM:    Vitals:    /85   Pulse 89   Temp 98.4 °F (36.9 °C)   Resp 14 Ht 5' 8\" (1.727 m)   Wt 198 lb (89.8 kg)   SpO2 97%   BMI 30.11 kg/m²   No intake/output data recorded. No intake/output data recorded. Physical Exam:  Gen: Resting in bed, NAD. HEENT: anicteric, NC/AT  CV: +S1/S2, RRR  Lungs: Good respiratory effort, clear air entry   Abd: NTND, PD catheter RLQ dressing C/D/I  Ext:+ edema, no cyanosis  Skin: Warm. No rashes appreciated. : No TTP over bladder, nondistended. Neuro: Alert and oriented x 3, nonfocal.  Joints: No erythema noted over joints. DATA:    CBC:   Lab Results   Component Value Date    WBC 5.0 04/08/2021    RBC 5.14 04/08/2021    HGB 14.5 04/08/2021    HCT 45.6 04/08/2021    MCV 88.8 04/08/2021    MCH 28.3 04/08/2021    MCHC 31.9 04/08/2021    RDW 15.4 04/08/2021     04/08/2021    MPV 10.2 04/08/2021     BMP:    Lab Results   Component Value Date     04/08/2021    K 3.9 04/08/2021    CL 99 04/08/2021    CO2 28 04/08/2021    BUN 53 04/08/2021    LABALBU 4.1 04/08/2021    CREATININE 7.7 04/08/2021    CALCIUM 8.2 04/08/2021    GFRAA 9 04/08/2021    GFRAA 44 06/15/2013    LABGLOM 7 04/08/2021    GLUCOSE 167 04/08/2021       IMPRESSION/RECOMMENDATIONS:      ESKD: [facility] nephrologist Dr. Natalia Temple  -CCPD-8hrs therapy time, fill volume 2.3L, total 3 exchanges-no LBF  -lytes stable, will continue same    FEN:  -K is 3.9-liberalize in diet    NSTEMI: Tp trend noted-stress test positive  -started on heparin drip  -plan for LHC in am    CAD: h/o CABG in the past    HTN: stable on HDLZ/imdur/coreg/amlodipine    Anemia: no PRISCILLA needed    CKD-MBD: check phos, continue calcitriol    DM1: has insulin pump        Thank you for allowing me to participate in the care of this patient. I will continue to follow along. Please call with questions.     Roxana Griffin MD

## 2021-04-08 NOTE — ED PROVIDER NOTES
[facility]  EMERGENCY DEPARTMENT ENCOUNTER        Pt Name: Vickie Yoo  MRN: [id number]  [place] 1965  Date of evaluation: 4/8/2021  Provider: Andres Harley PA-C  PCP: Art Leigh MD    ILEANA. I have evaluated this patient. My supervising physician was available for consultation. The total critical care time spent while evaluating and treating this patient was at least 36 minutes. This excludes time spent doing separately billable procedures. This includes time at the bedside, data interpretation, medication management, obtaining critical history from collateral sources if the patient is unable to provide it directly, and physician consultation. Specifics of interventions taken and potentially life-threatening diagnostic considerations are listed above in the medical decision making. CHIEF COMPLAINT       Chief Complaint   Patient presents with    Chest Pain     sent from stress lab for having chest pain and abmormal test       HISTORY OF PRESENT ILLNESS   (Location, Timing/Onset, Context/Setting, Quality, Duration, Modifying Factors, Severity, Associated Signs and Symptoms)  Note limiting factors. Vickie Yoo is a 49 y.o. male that presents to the emergency department with a chief complaint of intermittent chest pain. Patient has history of coronary artery disease, hypertension, diabetes, hyperlipidemia with CABG. He states for multiple years he has been getting chest pain with minimal exertion but over the past few months he has been now getting and at rest.  I discussed this with cardiology as he had an outpatient stress test on today that was abnormal and they would like him admitted for angiogram tomorrow. He is currently pain-free. Only took 81 mg aspirin this morning. Has history of chronic kidney disease on peritoneal dialysis and received his full peritoneal dialysis last night. Is on insulin pump.   Denies shortness of breath, cough, fevers, change in his chronic leg swelling, abdominal pain or any other symptoms. Nursing Notes were all reviewed and agreed with or any disagreements were addressed in the HPI. REVIEW OF SYSTEMS    (2-9 systems for level 4, 10 or more for level 5)     Review of Systems    Positives and Pertinent negatives as per HPI. Except as noted above in the ROS, all other systems were reviewed and negative.        PAST MEDICAL HISTORY     Past Medical History:   Diagnosis Date    [facility]     Cardiomyopathy ([facility]     Carotid artery stenosis 10/25/2016    SUZANNA stented with 8 x 30 mm non-tapered Xact stent    CHF (congestive heart failure) ([facility] 3/3/15    EF 30%     CKD (chronic kidney disease) stage 2, GFR 60-89 ml/min     Coronary artery disease     sp CABG UC    Diabetic peripheral neuropathy ([facility])     Diastolic HF (heart failure) ([facility])     Hyperlipidemia with target LDL less than 70     Hypertension goal BP (blood pressure) < 130/80     PVD (peripheral vascular disease) ([facility])     Seizures ([facility])     Type 1 diabetes mellitus with chronic kidney disease ([facility])     c-peptide <0.1 in 2015         SURGICAL HISTORY     Past Surgical History:   Procedure Laterality Date    CARDIAC CATHETERIZATION      CARDIAC SURGERY      triple bypass    CAROTID STENT PLACEMENT Right     CORONARY ARTERY BYPASS GRAFT      HERNIA REPAIR      LAPAROSCOPY INSERTION PERITONEAL CATHETER N/A 6/3/2020    LAPAROSCOPIC PLACEMENT OF PERITONEAL DIALYSIS  CATHETER performed by Graciela Dacosta MD at [place] [address]       Current Discharge Medication List      CONTINUE these medications which have NOT CHANGED    Details   Continuous Blood Gluc Sensor (DEXCOM G6 SENSOR) MISC 1 Device by Does not apply route daily  Qty: 1 each, Refills: 0      hydrALAZINE (APRESOLINE) 100 MG tablet TAKE ONE TABLET BY MOUTH THREE TIMES A DAY  Qty: 270 tablet, Refills: 2    Comments: Patient is awaiting new insurance to kick in and needs temporary short term refills. Quoted price by tech was $12.98. Thank you. nitroGLYCERIN (NITROSTAT) 0.4 MG SL tablet DISSOLVE 1 TAB UNDER TONGUE FOR CHEST PAIN - IF PAIN REMAINS AFTER 5 MIN, CALL 911 AND REPEAT DOSE. MAX 3 TABS IN 15 MINUTES  Qty: 25 tablet, Refills: 1      isosorbide mononitrate (IMDUR) 60 MG extended release tablet Take 1 tablet by mouth daily  Qty: 90 tablet, Refills: 0      aspirin 81 MG EC tablet Take 1 tablet by mouth daily  Qty: 30 tablet, Refills: 3      clopidogrel (PLAVIX) 75 MG tablet Take 1 tablet by mouth daily  Qty: 30 tablet, Refills: 3      rosuvastatin (CRESTOR) 40 MG tablet Take 1 tablet by mouth nightly  Qty: 30 tablet, Refills: 3      Cholecalciferol (VITAMIN D3 ULTRA POTENCY) 1.25 MG (30976 UT) TABS Take 50,000 Units by mouth once a week (on Saturdays)      calcitRIOL (ROCALTROL) 0.25 MCG capsule Take 0.25 mcg by mouth daily      carvedilol (COREG) 25 MG tablet TAKE TWO TABLETS BY MOUTH EVERY MORNING AND TAKE ONE TABLET BY MOUTH EVERY EVENING  Qty: 90 tablet, Refills: 5      ranolazine (RANEXA) 500 MG extended release tablet TAKE ONE TABLET BY MOUTH TWICE A DAY  Qty: 60 tablet, Refills: 2    Associated Diagnoses: Coronary artery disease due to lipid rich plaque      blood glucose test strips (ACCU-CHEK GUIDE) strip TEST BLOOD SUGAR 6 TIMES A DAY  Qty: 200 strip, Refills: 5      amLODIPine (NORVASC) 5 MG tablet Take 5 mg by mouth 2 times daily      furosemide (LASIX) 80 MG tablet Take 80 mg by mouth 2 times daily      EPINEPHrine (EPIPEN 2-JESSICA) 0.3 MG/0.3ML SOAJ injection Inject 0.3 mLs into the skin once for 1 dose Use as directed for allergic reaction  Qty: 0.3 mL, Refills: 0      Methylcobalamin (B-12) 1000 MCG TBDP Place 1,000 mcg under the tongue daily      insulin aspart (NOVOLOG FLEXPEN) 100 UNIT/ML injection pen Inject 0-7 Units into the skin 3 times daily (before meals) Per patient sliding scale.                ALLERGIES     Iodides, Shellfish-derived products, and Atorvastatin calcium    FAMILYHISTORY       Family History   Problem Relation Age of Onset    Coronary Art Dis Father     Diabetes Father     Coronary Art Dis Paternal Grandmother     Diabetes Paternal Grandmother     Hypertension Mother     Other Mother         Epilepsy    Other Sister         Thyroid disease  Heart murmur    Mult Sclerosis Brother     Arthritis Brother     Sleep Apnea Brother           SOCIAL HISTORY       Social History     Tobacco Use    Smoking status: Never Smoker    Smokeless tobacco: Never Used   Substance Use Topics    Alcohol use: Yes     Comment: one drink a month    Drug use: No       SCREENINGS      Heart Score for chest pain patients  History: Highly Suspicious  ECG: Non-Specifc repolarization disturbance/LBTB/PM  Patient Age: > 39 and < 65 years  *Risk factors for Atherosclerotic disease: Diabetes Mellitus, Hypercholesterolemia, Hypertension, Obesity, Coronary Artery Disease  Risk Factors: > 3 Risk factors or history of atherosclerotic disease*  Troponin: > 3X normal limit  Heart Score Total: 8      PHYSICAL EXAM    (up to 7 for level 4, 8 or more for level 5)     ED Triage Vitals [04/08/21 1314]   BP Temp Temp src Pulse Resp SpO2 Height Weight   135/85 98.4 °F (36.9 °C) -- 89 14 97 % 5' 8\" (1.727 m) 198 lb (89.8 kg)       Physical Exam  Vitals signs and nursing note reviewed. Constitutional:       Appearance: He is well-developed. He is not diaphoretic. HENT:      Head: Atraumatic. Nose: Nose normal.   Eyes:      General:         Right eye: No discharge. Left eye: No discharge. Neck:      Musculoskeletal: Normal range of motion. Cardiovascular:      Rate and Rhythm: Normal rate and regular rhythm. Heart sounds: No murmur. No friction rub. No gallop. Pulmonary:      Effort: Pulmonary effort is normal. No respiratory distress. Breath sounds: No stridor. No wheezing, rhonchi or rales.    Abdominal:      General: Bowel sounds are normal. There is no distension. Palpations: Abdomen is soft. There is no mass. Tenderness: There is no abdominal tenderness. There is no guarding or rebound. Hernia: No hernia is present. Musculoskeletal: Normal range of motion. General: No swelling. Right lower leg: Edema present. Left lower leg: Edema present. Comments: 2+ pretibial and pedal pitting edema bilaterally. Skin:     General: Skin is warm and dry. Findings: No erythema or rash. Neurological:      Mental Status: He is alert and oriented to person, place, and time. Cranial Nerves: No cranial nerve deficit.    Psychiatric:         Behavior: Behavior normal.         DIAGNOSTIC RESULTS   LABS:    Labs Reviewed   COMPREHENSIVE METABOLIC PANEL W/ REFLEX TO MG FOR LOW K - Abnormal; Notable for the following components:       Result Value    Glucose 167 (*)     BUN 53 (*)     CREATININE 7.7 (*)     GFR Non- 7 (*)     GFR  9 (*)     Calcium 8.2 (*)     AST 12 (*)     All other components within normal limits    Narrative:     CALL  [facility] tel. [phone],  Chemistry results called to and read back by Selene Goldberg, 04/08/2021  14:41, by Everardo Westfall  Performed at:  [facility] [address], [place], [address]   Phone [phone]   TROPONIN - Abnormal; Notable for the following components:    Troponin 0.19 (*)     All other components within normal limits    Narrative:     Emily Reap  [facility] tel. [phone],  Chemistry results called to and read back by Selene Goldberg, 04/08/2021  14:41, by Everardo Westfall  Performed at:  [facility] [address], [place], [address]   Phone [phone]   CULTURE, BODY FLUID   CULTURE, FUNGUS   GRAM STAIN   CBC WITH AUTO DIFFERENTIAL    Narrative:     Performed at:  [facility] [address], [place], [address]   Phone [phone]   APTT Male   Patient Number     [id number]         Date of Birth       1965   Visit Number       [id number]          Age                 49 year(s)   Accession Number   [id number]         Room Number         OP   Corporate ID       [id number]            NM Technician       Juan Carlos Christopher, ARRT   Nurse              Brisa Ibarra, RN  Interpreting        Gayle Sung,                                        Physician           MD, [facility]   Ordering Physician Yenifer Perez MD,                     [facility]   The procedure was explained in detail to the patient. Risks,  complications and alternative treatments were reviewed. Written consent  was obtained. Procedure Procedure Type:   Nuclear Stress Test:Pharmacological, NM MYOCARDIAL SPECT REST EXERCISE OR  RX   Study location: [facility]   Indications: Chest pain. Hospital Status: Outpatient. Height: 68.1 inches Weight: 195 pounds  Risk Factors   The patient risk factors include:prior PCI;prior CABG;peripheral arterial  disease, obesity, physical activity, treated and controlled  hypercholesterolemia, treated and controlled hypertension, family history of  premature CAD, insulin treated diabetes mellitus, chronic lung disease,  dyslipidemia, renal failure currently treated with dialysis , prior heart  failure and prior MI . Conclusions   Summary  -There is a moderate sized lateral completely reversible defect c/w  ischemia. -LV function is mildly depressed with EF=45% and mild inferior lateral  hypokinesis. -Chest pain this am and under camera. Recommendation  Pt sent to ER for evaluation and probable cardiac cath in am.,  Stress Protocols   Resting ECG  Normal sinus rhythm. Nonspecific ST-T wave abnormalities.    Resting HR:72 bpm  Resting BP:115/72 mmHg  Stress Protocol:Pharmacologic - Lexiscan's  Peak HR:83 bpm                   HR/BP product:8300  Peak BP:100/49 mmHg  Predicted HR: 166 bpm  % of predicted HR: 50  Test duration: 4 min  Reason for termination:Completed   ECG Findings  Nondiagnostic due to baseline abnormalities. Symptoms  Developed symptoms likely related to [address] [place] [address]. There was stress induced severe chest pain that  radiated to left arm and shoulder. Symptoms resolved with aminophylline. Complications  Procedure complication was none. Procedure Medications   - Aminophylline I.V. 100 mg.   - Lexiscan I.V. 0.4 mg.10 sec  Imaging Protocols   - One Day   Rest                          Stress   Isotope:Myoview/Tetrofosmin   Isotope: Myoview/Tetrofosmin  Isotope dose:10 mCi           Isotope dose:32.4 mCi  Administration Route:I.V. Administration Route:I.V.  Date:04/08/2021 10:15         Date:04/08/2021 11:38                                 Technique:      Gated  Imaging Results    Stress ejection    Ejection fraction:45 %    EDV :260 ml    ESV :143 ml    Stroke volume :117 ml    LV mass :220 gr  Medical History   Additional Medical History   Angina at rest  - Cardiomyopathy  - Carotid artery stenosis 10/25/2016  SUZANNA stented with 8 x 30 mm non-tapered Xact stent  - CHF (congestive heart failure) 3/3/15  EF 30%  - CKD (chronic kidney disease) stage 2, GFR 60-89 ml/min  - Coronary artery disease  sp CABG UC  - Diabetic peripheral neuropathy  - Diastolic HF (heart failure)  - Hyperlipidemia with target LDL less than 70  - Hypertension goal BP (blood pressure) < 130/80  - PVD (peripheral vascular disease)  - Seizures  - Type 1 diabetes mellitus with chronic kidney disease  c-peptide <0.1 in 2015   Right heart cath 5/1/2020  FINDINGS:  1. Evidence of severe diastolic dysfunction with a pulmonary  capillary  wedge pressure of 25. Right atrial pressure is 5 mmHg. 2. Pulmonary hypertension, likely pulmonary venous hypertension  with an  instantaneous pressure of 38/14 for a mean pulmonary arterial  pressure  of 27 mmHg.   3. Preserved cardiac output by thermodilution at 6.05 liters per  minute  for a cardiac index of 2.9 liters per kilogram per minute. 4. Normal mixed venous oxygen saturations in the pulmonary  artery at  74%, in the right atrium at 71% on room air. Summary  -Normal left ventricle size.  -Overall left ventricular function is decreased with an ejection  fraction in  the 40% range. There is hypokinesis of the inferior and lateral  walls. -Diastolic filling parameters suggest grade I diastolic  dysfunction. E/e=16.45.  -Mitral annular calcification is present. -Mild mitral regurgitation. Signatures   ------------------------------------------------------------------  Electronically signed by Jackie Rose MD, [facility] (Interpreting  physician) on 04/08/2021 at 12:50  ------------------------------------------------------------------            PROCEDURES   Unless otherwise noted below, none     Procedures    CRITICAL CARE TIME   N/A    CONSULTS:  1. Dr. Jazzy Bañuelos -cardiology  2.  Dr. Gin Conroy -nephrology    EMERGENCY DEPARTMENT COURSE and DIFFERENTIAL DIAGNOSIS/MDM:   Vitals:    Vitals:    04/08/21 1314   BP: 135/85   Pulse: 89   Resp: 14   Temp: 98.4 °F (36.9 °C)   SpO2: 97%   Weight: 198 lb (89.8 kg)   Height: 5' 8\" (1.727 m)       Patient was given the following medications:  Medications   heparin (porcine) injection 4,000 Units (has no administration in time range)   heparin (porcine) injection 2,000 Units (has no administration in time range)   heparin 25,000 units in dextrose 5% 250 mL (premix) infusion (10 mL/hr Intravenous Rate/Dose Verify 4/8/21 6928)   amLODIPine (NORVASC) tablet 5 mg (has no administration in time range)   calcitRIOL (ROCALTROL) capsule 0.25 mcg (has no administration in time range)   carvedilol (COREG) tablet 25 mg (has no administration in time range)   clopidogrel (PLAVIX) tablet 75 mg (has no administration in time range)   furosemide (LASIX) tablet 80 mg (has no administration in time range)   isosorbide mononitrate (IMDUR) extended release tablet 60 mg (has no administration in time range)   hydrALAZINE (APRESOLINE) tablet 100 mg (has no administration in time range)   nitroGLYCERIN (NITROSTAT) SL tablet 0.4 mg (has no administration in time range)   ranolazine (RANEXA) extended release tablet 500 mg (has no administration in time range)   rosuvastatin (CRESTOR) tablet 40 mg (has no administration in time range)   sodium chloride flush 0.9 % injection 5-40 mL (has no administration in time range)   sodium chloride flush 0.9 % injection 5-40 mL (has no administration in time range)   0.9 % sodium chloride infusion (has no administration in time range)   promethazine (PHENERGAN) tablet 12.5 mg (has no administration in time range)     Or   ondansetron (ZOFRAN) injection 4 mg (has no administration in time range)   acetaminophen (TYLENOL) tablet 650 mg (has no administration in time range)     Or   acetaminophen (TYLENOL) suppository 650 mg (has no administration in time range)   polyethylene glycol (GLYCOLAX) packet 17 g (has no administration in time range)   aspirin chewable tablet 81 mg (has no administration in time range)   dianeal lo-miller (ULTRABAG) 2.5% 23,000 mL (has no administration in time range)   dianeal lo-miller (ULTRABAG) 2.5% 2,300 mL (has no administration in time range)   dianeal lo-miller (ULTRABAG) 2.5% 2,300 mL (has no administration in time range)   gentamicin (GARAMYCIN) 0.1 % cream (has no administration in time range)   carvedilol (COREG) tablet 50 mg (has no administration in time range)   aspirin chewable tablet 162 mg (162 mg Oral Given 4/8/21 1341)   heparin (porcine) injection 4,000 Units (4,000 Units Intravenous Given 4/8/21 1725)           Patient presented with abnormal stress test.  Has been having unstable angina-like symptoms for multiple months. He has history of chronic kidney disease on peritoneal dialysis. His normal troponins run 0.04-0.06. It is 0.19 today. He is pain-free at this time.   Was started on aspirin and heparin after discussion with cardiology. Plan is for angiogram tomorrow. EKG reveals no acute ischemia at this time. Low suspicion for pulmonary embolus, aortic dissection, pericarditis, myocarditis or other emergent etiology. Patient understood why he needed to be admitted and was stable time of admission. FINAL IMPRESSION      1. NSTEMI (non-ST elevated myocardial infarction) [facility])          DISPOSITION/PLAN   DISPOSITION Admitted 04/08/2021 04:53:15 PM      PATIENT REFERREDTO:  No follow-up provider specified.     DISCHARGE MEDICATIONS:  Current Discharge Medication List          DISCONTINUED MEDICATIONS:  Current Discharge Medication List                 (Please note that portions of this note were completed with a voice recognition program.  Efforts were made to edit the dictations but occasionally words are mis-transcribed.)    Alexis Leong PA-C (electronically signed)            Alexis Leong PA-C  04/08/21 1934

## 2021-04-08 NOTE — PROGRESS NOTES
Pt seen in  ED, admission completed. Pt is alert and oriented x 3. Pt lives at home with his girlfriend, and is being admitted for unstable angina. Plan of care updated, all questions answered.

## 2021-04-08 NOTE — PROGRESS NOTES
Pt unable to walk long distances and has EKG changes noted. Ok to continue with [facility] Ramirez per Dr. Ly Mortensen. Instructed on [place] Stress Test Procedure including possible side effects/ adverse reactions. Patient verbalizes  understanding and denies having any questions . See [address] [place] [address] Rd Cardiology

## 2021-04-08 NOTE — ED PROVIDER NOTES
EKG is read by myself. Dated today at 65. Rate 70. Sinus rhythm. Nonspecific changes diffusely.   These are grossly unchanged from 4 March 2021     Luci Manning MD  04/08/21 3352

## 2021-04-08 NOTE — H&P
Hospital Medicine History & Physical      PCP: Shilpi Hickman MD    Date of Admission: 4/8/2021    Date of Service: Pt seen/examined on 4/8/2021  and Admitted to Inpatient with expected LOS greater than two midnights due to medical therapy. Chief Complaint:    Chief Complaint   Patient presents with    Chest Pain     sent from stress lab for having chest pain and abmormal test        History Of Present Illness: The patient is a 49 y.o. male with history of with history of type 1 diabetes on insulin pump with peripheral neuropathy, end-stage renal disease on peritoneal dialysis, history of diastolic heart failure, hyperlipidemia, hypertension, CAD and previous CABG who follows up with cardiology. She has been having intermittent chest pain and was seen for a stress test today noted to be abnormal hence sent to the ER for further evaluation. He reports intermittent chest pain over the last few months which has progressively worsened and he will pain at rest it comes on randomly.   Laboratory work-up was notable for troponin of 0.19  Chest x-ray with no acute pathology  EKG with no ischemic changes  Nuclear medicine stress test was noted for reversible ischemia laterally    Past Medical History:        Diagnosis Date    Angina at rest ([address]     Cardiomyopathy ([address]     Carotid artery stenosis 10/25/2016    SUZANNA stented with 8 x 30 mm non-tapered Xact stent    CHF (congestive heart failure) ([address] 3/3/15    EF 30%     CKD (chronic kidney disease) stage 2, GFR 60-89 ml/min     Coronary artery disease     sp CABG UC    Diabetic peripheral neuropathy (HCC)     Di[facility] HF [facility] failure) [facility]     Hyperlipidemia with target LDL less than 70     Hypertension goal BP (blood pressure) < 130/80     PVD (peripheral vascular disease) ([facility])     Seizures (HCC)     Type 1 diabetes mellitus with chronic kidney disease ([address]     c-peptide <0.1 in 2015       Past Surgical History:        Procedure Laterality Date  CARDIAC CATHETERIZATION      CARDIAC SURGERY      triple bypass    CORONARY ARTERY BYPASS GRAFT      HERNIA REPAIR      LAPAROSCOPY INSERTION PERITONEAL CATHETER N/A 6/3/2020    LAPAROSCOPIC PLACEMENT OF PERITONEAL DIALYSIS  CATHETER performed by Bouchra Hernández MD at [address] [facility]         Medications Prior to Admission:    Prior to Admission medications    Medication Sig Start Date End Date Taking? Authorizing Provider   Continuous Blood Gluc Sensor (DEXCOM G6 SENSOR) MISC 1 Device by Does not apply route daily 3/26/21  Yes Thanh Henderson MD   hydrALAZINE (APRESOLINE) 100 MG tablet TAKE ONE TABLET BY MOUTH THREE TIMES A DAY 3/24/21  Yes JAYLEN Grant CNP   nitroGLYCERIN (NITROSTAT) 0.4 MG SL tablet DISSOLVE 1 TAB UNDER TONGUE FOR CHEST PAIN - IF PAIN REMAINS AFTER 5 MIN, CALL 911 AND REPEAT DOSE.  MAX 3 TABS IN 15 MINUTES 3/24/21  Yes JAYLEN Grant CNP   isosorbide mononitrate (IMDUR) 60 MG extended release tablet Take 1 tablet by mouth daily  Patient taking differently: Take 60 mg by mouth daily 2 tabs in the am 3/19/21  Yes JAYLEN Grant CNP   aspirin 81 MG EC tablet Take 1 tablet by mouth daily 3/4/21  Yes Mirella Payne MD   clopidogrel (PLAVIX) 75 MG tablet Take 1 tablet by mouth daily 3/4/21  Yes Mirella Payne MD   rosuvastatin (CRESTOR) 40 MG tablet Take 1 tablet by mouth nightly 3/3/21  Yes Mirella Payne MD   Cholecalciferol (VITAMIN D3 ULTRA POTENCY) 1.25 MG (30898 UT) TABS Take 50,000 Units by mouth once a week (on Saturdays)   Yes Historical Provider, MD   calcitRIOL (ROCALTROL) 0.25 MCG capsule Take 0.25 mcg by mouth daily   Yes Historical Provider, MD   carvedilol (COREG) 25 MG tablet TAKE TWO TABLETS BY MOUTH EVERY MORNING AND TAKE ONE TABLET BY MOUTH EVERY EVENING 1/12/21  Yes JAYLEN Grant CNP   ranolazine (RANEXA) 500 MG extended release tablet TAKE ONE TABLET BY MOUTH TWICE A DAY 1/12/21  Yes JAYLEN Grant - CNP   blood glucose test strips (ACCU-CHEK GUIDE) strip TEST BLOOD SUGAR 6 TIMES A DAY 7/30/20  Yes Emilia Ennis MD   amLODIPine (NORVASC) 5 MG tablet Take 5 mg by mouth 2 times daily   Yes Historical Provider, MD   furosemide (LASIX) 80 MG tablet Take 80 mg by mouth 2 times daily   Yes Historical Provider, MD   EPINEPHrine (EPIPEN 2-JESSICA) 0.3 MG/0.3ML SOAJ injection Inject 0.3 mLs into the skin once for 1 dose Use as directed for allergic reaction 6/11/20 4/8/21 Yes Mary Mireles APRN - CNP   Methylcobalamin (B-12) 1000 MCG TBDP Place 1,000 mcg under the tongue daily   Yes Historical Provider, MD   insulin aspart (NOVOLOG FLEXPEN) 100 UNIT/ML injection pen Inject 0-7 Units into the skin 3 times daily (before meals) Per patient sliding scale. Historical Provider, MD       Allergies: Iodides, Shellfish-derived products, and Atorvastatin calcium    Social History:  The patient currently lives with family    TOBACCO:   reports that he has never smoked. He has never used smokeless tobacco.  ETOH:   reports current alcohol use. Family History:  Reviewed in detail and negative for DM, Early CAD, Cancer, CVA. Positive as follows:        Problem Relation Age of Onset    Coronary Art Dis Father     Diabetes Father     Coronary Art Dis Paternal Grandmother     Diabetes Paternal Grandmother     Hypertension Mother     Other Mother         Epilepsy    Other Sister         Thyroid disease  Heart murmur    Mult Sclerosis Brother     Arthritis Brother     Sleep Apnea Brother        REVIEW OF SYSTEMS:   Positive for ongoing chest pain both with activity and at rest and as noted in the HPI. All other systems reviewed and negative. PHYSICAL EXAM:    /85   Pulse 89   Temp 98.4 °F (36.9 °C)   Resp 14   Ht 5' 8\" (1.727 m)   Wt 198 lb (89.8 kg)   SpO2 97%   BMI 30.11 kg/m²     General appearance: No apparent distress appears stated age and cooperative.   HEENT Normal cephalic, atraumatic without obvious deformity. Pupils equal, round, and reactive to light. Extra ocular muscles intact. Conjunctivae/corneas clear. Neck: Supple, No jugular venous distention/bruits. Lungs: Clear to auscultation, bilaterally without Rales/Wheezes/Rhonchi with good respiratory effort. Heart: Regular rate and rhythm with Normal S1/S2 without murmurs, rubs or gallops, point of maximum impulse non-displaced  Abdomen: Soft, non-tender or non-distended without rigidity or guarding and positive bowel sounds all four quadrants. Extremities: No clubbing, cyanosis, or edema bilaterally. Skin: Skin color, texture, turgor normal.  No rashes or lesions. Neurologic: Alert and oriented X 3, neurovascularly intact with sensory/motor intact upper extremities/lower extremities, bilaterally. Cranial nerves: II-XII intact, grossly non-focal.  Mental status: Alert, oriented, thought content appropriate. CBC   Recent Labs     04/08/21  1345   WBC 5.0   HGB 14.5   HCT 45.6         RENAL  Recent Labs     04/08/21  1345      K 3.9   CL 99   CO2 28   BUN 53*   CREATININE 7.7*     LFT'S  Recent Labs     04/08/21  1345   AST 12*   ALT 13   BILITOT <0.2   ALKPHOS 111     COAG  No results for input(s): INR in the last 72 hours.   CARDIAC ENZYMES  Recent Labs     04/08/21  1345   TROPONINI 0.19*       U/A:    Lab Results   Component Value Date    COLORU YELLOW 05/01/2020    WBCUA 0 05/01/2020    RBCUA 29 05/01/2020    MUCUS 1+ 05/25/2010    BACTERIA Rare 05/25/2010    CLARITYU Clear 05/01/2020    SPECGRAV 1.012 05/01/2020    LEUKOCYTESUR Negative 05/01/2020    BLOODU SMALL 05/01/2020    GLUCOSEU 250 05/01/2020    GLUCOSEU >=1000 05/25/2010       ABG    Lab Results   Component Value Date    YDF2FSI 25.6 05/26/2010    BEART 0.3 05/26/2010    I7DNRUDG 99.0 05/26/2010    PHART 7.389 05/26/2010    THGBART 16.1 05/26/2010    WUN0WRF 43.3 05/26/2010    PO2ART 137.3 05/26/2010           Active Hospital Problems    Diagnosis Date Noted  Unstable angina ([facility] [I20.0] 04/08/2021    ESRD on peritoneal dialysis ([facility] [N18.6, Z99.2] 04/08/2021    HTN (hypertension), benign [I10]     Pure hypercholesterolemia [E78.00]     Type 1 diabetes mellitus with chronic kidney disease ([facility] [E10.22]     PVD (peripheral vascular disease) ([facility] [I73.9]          ASSESSMENT/PLAN:   49 y.o. male with history of with history of type 1 diabetes on insulin pump with peripheral neuropathy, end-stage renal disease on peritoneal dialysis, history of diastolic heart failure, hyperlipidemia, hypertension, CAD and previous CABG who had a history of positive stress test this morning and admitted for further work-up with plans for left heart cath tomorrow morning. Admitted for unstable angina with troponin bump    Plan:  -Start heparin drip and hold NOAC  -Continue antianginal therapy including Coreg, isosorbide mononitrate, Ranexa, aspirin and Plavix  -Continue insulin pump with carb counting and patient dosing  -Nephrology consult for peritoneal dialysis management  -Cardiology consult in view of left heart cath tomorrow  -Admit to cardiac unit      DVT Prophylaxis: On heparin drip  Diet: General diet with carb counting  Code Status: Full code         Marcell Diaz MD    Thank you Gabriela Florian MD for the opportunity to be involved in this patient's care. If you have any questions or concerns please feel free to contact me at [phone].

## 2021-04-09 ENCOUNTER — HOSPITAL ENCOUNTER (OUTPATIENT)
Dept: OCCUPATIONAL THERAPY | Age: 56
Setting detail: THERAPIES SERIES
Discharge: HOME | End: 2021-04-09
Payer: COMMERCIAL

## 2021-04-09 LAB
ANION GAP SERPL CALCULATED.3IONS-SCNC: 13 MMOL/L (ref 3–16)
APPEARANCE FLUID: CLEAR
APTT: 32.1 SEC (ref 24.2–36.2)
APTT: 42.8 SEC (ref 24.2–36.2)
APTT: 68.3 SEC (ref 24.2–36.2)
APTT: 72.6 SEC (ref 24.2–36.2)
BUN BLDV-MCNC: 51 MG/DL (ref 7–20)
CALCIUM SERPL-MCNC: 8.2 MG/DL (ref 8.3–10.6)
CELL COUNT FLUID TYPE: NORMAL
CHLORIDE BLD-SCNC: 102 MMOL/L (ref 99–110)
CLOT EVALUATION: NORMAL
CO2: 24 MMOL/L (ref 21–32)
COLOR FLUID: COLORLESS
CREAT SERPL-MCNC: 7.1 MG/DL (ref 0.9–1.3)
FLUID DIFF COMMENT: NORMAL
GFR AFRICAN AMERICAN: 10
GFR NON-AFRICAN AMERICAN: 8
GLUCOSE BLD-MCNC: 167 MG/DL (ref 70–99)
HCT VFR BLD CALC: 45.6 % (ref 40.5–52.5)
HEMOGLOBIN: 14.7 G/DL (ref 13.5–17.5)
LEFT VENTRICULAR EJECTION FRACTION MODE: NORMAL
LV EF: 35 %
MAGNESIUM: 2.1 MG/DL (ref 1.8–2.4)
MCH RBC QN AUTO: 28.6 PG (ref 26–34)
MCHC RBC AUTO-ENTMCNC: 32.2 G/DL (ref 31–36)
MCV RBC AUTO: 88.6 FL (ref 80–100)
NUCLEATED CELLS FLUID: 6 /CUMM
PDW BLD-RTO: 15.5 % (ref 12.4–15.4)
PHOSPHORUS: 4.9 MG/DL (ref 2.5–4.9)
PLATELET # BLD: 158 K/UL (ref 135–450)
PMV BLD AUTO: 10.1 FL (ref 5–10.5)
POTASSIUM SERPL-SCNC: 3.6 MMOL/L (ref 3.5–5.1)
RBC # BLD: 5.14 M/UL (ref 4.2–5.9)
RBC FLUID: 1 /CUMM
SODIUM BLD-SCNC: 139 MMOL/L (ref 136–145)
WBC # BLD: 4.7 K/UL (ref 4–11)

## 2021-04-09 PROCEDURE — 87070 CULTURE OTHR SPECIMN AEROBIC: CPT

## 2021-04-09 PROCEDURE — 2580000003 HC RX 258: Performed by: INTERNAL MEDICINE

## 2021-04-09 PROCEDURE — 87102 FUNGUS ISOLATION CULTURE: CPT

## 2021-04-09 PROCEDURE — 2500000003 HC RX 250 WO HCPCS

## 2021-04-09 PROCEDURE — 6360000004 HC RX CONTRAST MEDICATION: Performed by: INTERNAL MEDICINE

## 2021-04-09 PROCEDURE — 2580000003 HC RX 258

## 2021-04-09 PROCEDURE — 6360000002 HC RX W HCPCS

## 2021-04-09 PROCEDURE — B2131ZZ FLUOROSCOPY OF MULTIPLE CORONARY ARTERY BYPASS GRAFTS USING LOW OSMOLAR CONTRAST: ICD-10-PCS | Performed by: INTERNAL MEDICINE

## 2021-04-09 PROCEDURE — 87205 SMEAR GRAM STAIN: CPT

## 2021-04-09 PROCEDURE — 93458 L HRT ARTERY/VENTRICLE ANGIO: CPT

## 2021-04-09 PROCEDURE — 6360000002 HC RX W HCPCS: Performed by: PHYSICIAN ASSISTANT

## 2021-04-09 PROCEDURE — C1887 CATHETER, GUIDING: HCPCS

## 2021-04-09 PROCEDURE — 92937 PRQ TRLUML REVSC CAB GRF 1: CPT | Performed by: INTERNAL MEDICINE

## 2021-04-09 PROCEDURE — 027034Z DILATION OF CORONARY ARTERY, ONE ARTERY WITH DRUG-ELUTING INTRALUMINAL DEVICE, PERCUTANEOUS APPROACH: ICD-10-PCS | Performed by: INTERNAL MEDICINE

## 2021-04-09 PROCEDURE — 83735 ASSAY OF MAGNESIUM: CPT

## 2021-04-09 PROCEDURE — 93459 L HRT ART/GRFT ANGIO: CPT | Performed by: INTERNAL MEDICINE

## 2021-04-09 PROCEDURE — 99152 MOD SED SAME PHYS/QHP 5/>YRS: CPT

## 2021-04-09 PROCEDURE — 4A023N7 MEASUREMENT OF CARDIAC SAMPLING AND PRESSURE, LEFT HEART, PERCUTANEOUS APPROACH: ICD-10-PCS | Performed by: INTERNAL MEDICINE

## 2021-04-09 PROCEDURE — C1874 STENT, COATED/COV W/DEL SYS: HCPCS

## 2021-04-09 PROCEDURE — C1725 CATH, TRANSLUMIN NON-LASER: HCPCS

## 2021-04-09 PROCEDURE — 80048 BASIC METABOLIC PNL TOTAL CA: CPT

## 2021-04-09 PROCEDURE — 92937 PRQ TRLUML REVSC CAB GRF 1: CPT

## 2021-04-09 PROCEDURE — B2111ZZ FLUOROSCOPY OF MULTIPLE CORONARY ARTERIES USING LOW OSMOLAR CONTRAST: ICD-10-PCS | Performed by: INTERNAL MEDICINE

## 2021-04-09 PROCEDURE — C1769 GUIDE WIRE: HCPCS

## 2021-04-09 PROCEDURE — 84100 ASSAY OF PHOSPHORUS: CPT

## 2021-04-09 PROCEDURE — C1894 INTRO/SHEATH, NON-LASER: HCPCS

## 2021-04-09 PROCEDURE — 99153 MOD SED SAME PHYS/QHP EA: CPT

## 2021-04-09 PROCEDURE — B2151ZZ FLUOROSCOPY OF LEFT HEART USING LOW OSMOLAR CONTRAST: ICD-10-PCS | Performed by: INTERNAL MEDICINE

## 2021-04-09 PROCEDURE — 85730 THROMBOPLASTIN TIME PARTIAL: CPT

## 2021-04-09 PROCEDURE — 2709999900 HC NON-CHARGEABLE SUPPLY

## 2021-04-09 PROCEDURE — 6370000000 HC RX 637 (ALT 250 FOR IP): Performed by: INTERNAL MEDICINE

## 2021-04-09 PROCEDURE — 89050 BODY FLUID CELL COUNT: CPT

## 2021-04-09 PROCEDURE — 92941 PRQ TRLML REVSC TOT OCCL AMI: CPT | Performed by: INTERNAL MEDICINE

## 2021-04-09 PROCEDURE — 99152 MOD SED SAME PHYS/QHP 5/>YRS: CPT | Performed by: INTERNAL MEDICINE

## 2021-04-09 PROCEDURE — 85027 COMPLETE CBC AUTOMATED: CPT

## 2021-04-09 PROCEDURE — 85347 COAGULATION TIME ACTIVATED: CPT

## 2021-04-09 PROCEDURE — 2000000000 HC ICU R&B

## 2021-04-09 RX ORDER — HYDRALAZINE HYDROCHLORIDE 25 MG/1
50 TABLET, FILM COATED ORAL EVERY 8 HOURS SCHEDULED
Status: DISCONTINUED | OUTPATIENT
Start: 2021-04-09 | End: 2021-04-10 | Stop reason: HOSPADM

## 2021-04-09 RX ORDER — DEXTROSE MONOHYDRATE 50 MG/ML
100 INJECTION, SOLUTION INTRAVENOUS PRN
Status: DISCONTINUED | OUTPATIENT
Start: 2021-04-09 | End: 2021-04-10 | Stop reason: HOSPADM

## 2021-04-09 RX ORDER — DEXTROSE MONOHYDRATE 25 G/50ML
12.5 INJECTION, SOLUTION INTRAVENOUS PRN
Status: DISCONTINUED | OUTPATIENT
Start: 2021-04-09 | End: 2021-04-10 | Stop reason: HOSPADM

## 2021-04-09 RX ORDER — ATROPINE SULFATE 0.1 MG/ML
INJECTION INTRAVENOUS
Status: COMPLETED
Start: 2021-04-09 | End: 2021-04-09

## 2021-04-09 RX ORDER — NICOTINE POLACRILEX 4 MG
15 LOZENGE BUCCAL PRN
Status: DISCONTINUED | OUTPATIENT
Start: 2021-04-09 | End: 2021-04-10 | Stop reason: HOSPADM

## 2021-04-09 RX ADMIN — ISOSORBIDE MONONITRATE 60 MG: 60 TABLET, EXTENDED RELEASE ORAL at 08:59

## 2021-04-09 RX ADMIN — HYDRALAZINE HYDROCHLORIDE 50 MG: 25 TABLET, FILM COATED ORAL at 20:47

## 2021-04-09 RX ADMIN — CALCITRIOL 0.25 MCG: 0.25 CAPSULE ORAL at 08:59

## 2021-04-09 RX ADMIN — CARVEDILOL 50 MG: 25 TABLET, FILM COATED ORAL at 08:59

## 2021-04-09 RX ADMIN — FUROSEMIDE 80 MG: 40 TABLET ORAL at 18:34

## 2021-04-09 RX ADMIN — HYDRALAZINE HYDROCHLORIDE 50 MG: 25 TABLET, FILM COATED ORAL at 14:14

## 2021-04-09 RX ADMIN — HYDRALAZINE HYDROCHLORIDE 100 MG: 100 TABLET, FILM COATED ORAL at 06:02

## 2021-04-09 RX ADMIN — AMLODIPINE BESYLATE 5 MG: 5 TABLET ORAL at 20:46

## 2021-04-09 RX ADMIN — HEPARIN SODIUM 1400 UNITS/HR: 10000 INJECTION, SOLUTION INTRAVENOUS at 14:12

## 2021-04-09 RX ADMIN — IOPAMIDOL 132 ML: 755 INJECTION, SOLUTION INTRAVENOUS at 16:00

## 2021-04-09 RX ADMIN — ROSUVASTATIN CALCIUM 40 MG: 40 TABLET, FILM COATED ORAL at 20:47

## 2021-04-09 RX ADMIN — RANOLAZINE 500 MG: 500 TABLET, FILM COATED, EXTENDED RELEASE ORAL at 20:47

## 2021-04-09 RX ADMIN — CLOPIDOGREL BISULFATE 75 MG: 75 TABLET ORAL at 08:58

## 2021-04-09 RX ADMIN — CARVEDILOL 25 MG: 25 TABLET, FILM COATED ORAL at 18:34

## 2021-04-09 RX ADMIN — RANOLAZINE 500 MG: 500 TABLET, FILM COATED, EXTENDED RELEASE ORAL at 08:59

## 2021-04-09 RX ADMIN — AMLODIPINE BESYLATE 5 MG: 5 TABLET ORAL at 08:59

## 2021-04-09 RX ADMIN — FUROSEMIDE 80 MG: 40 TABLET ORAL at 11:02

## 2021-04-09 RX ADMIN — Medication 10 ML: at 01:03

## 2021-04-09 RX ADMIN — ASPIRIN 81 MG: 81 TABLET, CHEWABLE ORAL at 08:59

## 2021-04-09 RX ADMIN — Medication 10 ML: at 20:51

## 2021-04-09 ASSESSMENT — PAIN SCALES - GENERAL
PAINLEVEL_OUTOF10: 0
PAINLEVEL_OUTOF10: 0

## 2021-04-09 NOTE — FLOWSHEET NOTE
CCPD Order   Exchanges: 3   Exchange Volume: 2300 ml   Total Time: 8 hrs   Dextrose: 2.5%   Last Fill: 0 ml   Total Volume: 6900 ml     Orders verified. Supplies taken to pt's room. Report received. Cycler set up, primed and pre tested. Dressing changed on [facility] Cath site. Pt connected to cycler. CCPD initiated without problem. Initial effluent clear.           04/08/21 2000   Vitals   BP (!) 150/69   Temp 97.7 °F (36.5 °C)   Temp Source Temporal   Pulse 81   Resp 18   SpO2 98 %   Weight 201 lb 11.5 oz (91.5 kg)   Cycler   Verification of Prescription CCPD   Total Volume Programmed 6900 mL   Therapy Time (Hours:Minutes) 8:00   Fill Volume 2300 mL   Last Fill Volume 0 mL   Dextrose Setting Same (Nonextraneal)   Number of Cycles 3   Bag Volume 5000 mL   Number of Bags Used 2   Dianeal Solution Other (Comment)  (Delflex 2.5% in 5000 ml)

## 2021-04-09 NOTE — FLOWSHEET NOTE
Occupational Therapy  Cancellation/No-show Note  Patient Name:  Patricia Centeno   :  1965   Date:  2021  Cancelled visits to date: 1  No-shows to date: 3    Patient status for today's appointment patient:   []  Cancelled - 3/31/21, 21    []  Rescheduled appointment  [x]  No-show - 3/17/21, 21, 21      Reason given by patient:  []  Patient ill  []  Conflicting appointment  []  No transportation    []  Conflict with work  []  No reason given  []  Other:     Comments:      Phone call information:   [x]  Phone call made today to patient at number provided:      []  Patient answered, conversation as follows:    [x]  Patient did not answer, message left as follows: Notified pt of missed appt and next appt on Wednesday, .    []  Phone call not made today     Electronically signed by:  Elisa Pitt, OTR/L [id number]

## 2021-04-09 NOTE — PROGRESS NOTES
Nephrology Consult Note  [phone]   [email or web address]        Reason for Consult:  ESKD    HISTORY OF PRESENT ILLNESS:                This is a patient with significant past medical history of ESKD on CCPD, T1DM, HTN, CAD s/p CABG, CHF, PVD who presented to ER for chest pain. Pt had a stress test today. He experienced chest pain during stress portion of exam.  There is reversible defect noted. He reports progressively worsening symptoms for the past few months, now having pain at rest.  He denies any SOB, fever/chills/N/V. Does have chronic BLE that is stable. He reports PD is going well.    -Tp is positive-started on heparin      Subjective:  -pt seen and examined  -PMSHx and meds reviewed  -No family at bedside    No acute events ON  BP variable  LHC today  Had chest pain this am  Edema is better    ROS: neg SOB/chest pain    PHYSICAL EXAM:    Vitals:    /60   Pulse 74   Temp 97.2 °F (36.2 °C) (Temporal)   Resp 13   Ht 5' 8\" (1.727 m)   Wt 200 lb 9.9 oz (91 kg)   SpO2 97%   BMI 30.50 kg/m²   I/O last 3 completed shifts: In: 240 [P.O.:240]  Out: 865 [Urine:600]  No intake/output data recorded. Physical Exam:  Gen: Resting in bed, NAD. HEENT: anicteric, NC/AT  CV: +S1/S2, RRR  Lungs: Good respiratory effort, clear air entry   Abd: NTND, PD catheter RLQ dressing C/D/I  Ext: trace edema, no cyanosis  Skin: Warm. No rashes appreciated. : No TTP over bladder, nondistended. Neuro: Alert and oriented x 3, nonfocal.  Joints: No erythema noted over joints.     DATA:    CBC:   Lab Results   Component Value Date    WBC 4.7 04/09/2021    RBC 5.14 04/09/2021    HGB 14.7 04/09/2021    HCT 45.6 04/09/2021    MCV 88.6 04/09/2021    MCH 28.6 04/09/2021    MCHC 32.2 04/09/2021    RDW 15.5 04/09/2021     04/09/2021    MPV 10.1 04/09/2021     BMP:    Lab Results   Component Value Date     04/08/2021    K 3.9 04/08/2021    CL 99 04/08/2021    CO2 28 04/08/2021    BUN 53 04/08/2021    LABALBU 4.1 04/08/2021    CREATININE 7.7 04/08/2021    CALCIUM 8.2 04/08/2021    GFRAA 9 04/08/2021    GFRAA 44 06/15/2013    LABGLOM 7 04/08/2021    GLUCOSE 167 04/08/2021       IMPRESSION/RECOMMENDATIONS:      ESKD: [facility] nephrologist Dr. Jagdeep Camp  -Eilleen Loyd therapy time, fill volume 2.3L, total 3 exchanges-no LBF  -ON no issues with PD-tolerated well-total UF 265ml  -lytes pending today  -edema is improved  -continue current regimen for now    OK to proceed for C    FEN:  -pending    NSTEMI: Tp trend noted-stress test positive  -started on heparin drip  -plan for C today    CAD: h/o CABG in the past    HTN: Bp variable, trending lwoer  -lower HDLZ dose    Anemia: no PRISCILLA needed    CKD-MBD: check phos, continue calcitriol    DM1: has insulin pump        Thank you for allowing me to participate in the care of this patient. I will continue to follow along. Please call with questions.     Isabel Moreland MD

## 2021-04-09 NOTE — PROGRESS NOTES
CCPD Order   Exchanges: 3   Exchange Volume: 2300 ml   Total Time: 8 hrs   Dextrose: 2.5%   Last Fill: 0 ml   Total Volume: 6900 ml     Orders verified. Supplies taken to pt's room. Report received. Cycler set up, primed and pre tested. Dressing changed on [facility] Cath site. Pt connected to cycler. CCPD initiated without problem. Initial effluent clear.

## 2021-04-09 NOTE — PRE SEDATION
Brief Pre-Op Note/Sedation Assessment      Forrest Mcrae  1965  [id number]      [id number]  9:48 AM    Planned Procedure: Cardiac Catheterization Procedure  Post Procedure Plan: Return to same level of care  Consent: I have discussed with the patient and/or the patient representative the indication, alternatives, and the possible risks and/or complications of the planned procedure and the anesthesia methods. The patient and/or patient representative appear to understand and agree to proceed. Chief Complaint: Chest Pain/Pressure  Anginal Equivalent  Dyspnea on Exertion  Dyspnea  Indications for Cath Procedure:  New Onset Angina <= 2 months and Worsening Angina  Anginal Classification within 2 weeks:  CCS III - Symptoms with everyday living activities, i.e., moderate limitation  NYHA Heart Failure Class within 2 weeks: No symptoms  Is Cath Lab Visit Valve-related?: No  Anti- Anginal Meds within 2 weeks:   Yes: Beta Blockers, Ca Channel Blockers, Long Acting Nitrates (Any), Aspirin and Statin (Any)  Stress or Imaging Studies Performed:  Stress Test with SPECT Result: Positive:  lateral Risk/Extent of Ischemia:  Unavailable   Vital Signs:  /60   Pulse 74   Temp 97.2 °F (36.2 °C) (Temporal)   Resp 13   Ht 5' 8\" (1.727 m)   Wt 200 lb 9.9 oz (91 kg)   SpO2 97%   BMI 30.50 kg/m²   Allergies:   Allergies   Allergen Reactions    Iodides Anaphylaxis    Shellfish-Derived Products Anaphylaxis    Atorvastatin Calcium Rash   Past Medical History:  Past Medical History:   Diagnosis Date    [facility]     Cardiomyopathy [facility]     Carotid artery stenosis 10/25/2016    SUZANNA stented with 8 x 30 mm non-tapered Xact stent    CHF (congestive heart failure) ([facility] 3/3/15    EF 30%     CKD (chronic kidney disease) stage 2, GFR 60-89 ml/min     Coronary artery disease     sp CABG UC    Diabetic peripheral neuropathy (HCC)     Diastolic HF (heart failure) (HCC)     Hyperlipidemia with target LDL command  Respiration:  2 - Able to breathe deeply and cough freely  Circulation:  2 - BP+/- 20mmHg of normal  Consciousness:  2 - Fully awake  Oxygen Saturation (color):  2 - Able to maintain oxygen saturation >92% on room air  Sedation/Anesthesia Plan:  Guard the patient's safety and welfare. Minimize physical discomfort and pain. Minimize negative psychological responses to treatment by providing sedation and analgesia and maximize the potential amnesia. Patient to meet pre-procedure discharge plan.   Medication Planned:  midazolam intravenously and fentanyl intravenously    Patient is an appropriate candidate for plan of sedation: yes      Electronically signed by Tenzin Montez MD on 4/9/2021 at 9:48 AM

## 2021-04-09 NOTE — PROGRESS NOTES
Disconnected from CCPD per protocol. Effluent: clear yellow, no fibrin noted. Total time: 9 hr 24 min   Total UF:  265 ml. Total Volume:  6885 ml. Dwell time gained:  0 hr 12 min. Pt Tolerated procedure without difficulty. Catheter no issues. Pt occlusion of line, increased alarms, resolved with moving pt. Specimen obtained and sent to lab per orders.      Report given to: Shar Wright RN  Last BM: 4/7/21

## 2021-04-09 NOTE — OP NOTE
[address]  Procedure Note    Procedure: [facility], PCI of SVG  Indication: NSTEMI    Procedure Details:  Consent Access Bleed R Sedat Start Stop Versed Fentanyl Contrast Fluoro EBL Comp Spec   Yes RCFA high MCS 1456 1554 1.5 75 132 13.4 <20 None None   *Ultrasound Note: Ultrasound guidance used to determine aforementioned artery patency, size (>2mm), anatomic variations and ideal puncture location. Real-time ultrasound utilized concurrent with vascular needle entry into the artery. Image(s) permanently recorded and reported in the patient chart.   *Sedation Note: MCSFC = minimal conscious sedation for comfort    Findings:  Artery Findings/Result   LM Normal   % ostial   Cx 100% mid   % ostial   RCA Nondominant, Smaller RV marginal with mid 70% and distal 90% lesions supplying collateral to distal Cx (old finding), small but could potentially be stented if symptoms merit   S-OM Patent, 99% ostial with ABEL 1 flow   L-LAD patent   LVEDP 6   LVG 35%, inferior hk     Intervention:   PCI of SVG-OM1 with 3.5X23 Xience MARIIA (PD with 3.75NC throughout)    Post Cath Dx:   Severe disease as above, culprit SVG

## 2021-04-10 VITALS
HEIGHT: 68 IN | HEART RATE: 76 BPM | BODY MASS INDEX: 29.44 KG/M2 | SYSTOLIC BLOOD PRESSURE: 116 MMHG | DIASTOLIC BLOOD PRESSURE: 60 MMHG | RESPIRATION RATE: 17 BRPM | WEIGHT: 194.22 LBS | TEMPERATURE: 96.6 F | OXYGEN SATURATION: 92 %

## 2021-04-10 DIAGNOSIS — I25.83 CORONARY ARTERY DISEASE DUE TO LIPID RICH PLAQUE: ICD-10-CM

## 2021-04-10 DIAGNOSIS — I25.10 CORONARY ARTERY DISEASE DUE TO LIPID RICH PLAQUE: ICD-10-CM

## 2021-04-10 LAB
ANION GAP SERPL CALCULATED.3IONS-SCNC: 13 MMOL/L (ref 3–16)
APTT: 28 SEC (ref 24.2–36.2)
BASOPHILS ABSOLUTE: 0 K/UL (ref 0–0.2)
BASOPHILS RELATIVE PERCENT: 0.1 %
BUN BLDV-MCNC: 56 MG/DL (ref 7–20)
CALCIUM SERPL-MCNC: 8.2 MG/DL (ref 8.3–10.6)
CHLORIDE BLD-SCNC: 97 MMOL/L (ref 99–110)
CO2: 24 MMOL/L (ref 21–32)
CREAT SERPL-MCNC: 8.1 MG/DL (ref 0.9–1.3)
EOSINOPHILS ABSOLUTE: 0 K/UL (ref 0–0.6)
EOSINOPHILS RELATIVE PERCENT: 0 %
GFR AFRICAN AMERICAN: 8
GFR NON-AFRICAN AMERICAN: 7
GLUCOSE BLD-MCNC: 346 MG/DL (ref 70–99)
GLUCOSE BLD-MCNC: 417 MG/DL (ref 70–99)
HCT VFR BLD CALC: 43 % (ref 40.5–52.5)
HEMOGLOBIN: 13.9 G/DL (ref 13.5–17.5)
LYMPHOCYTES ABSOLUTE: 0.6 K/UL (ref 1–5.1)
LYMPHOCYTES RELATIVE PERCENT: 7.6 %
MCH RBC QN AUTO: 29 PG (ref 26–34)
MCHC RBC AUTO-ENTMCNC: 32.4 G/DL (ref 31–36)
MCV RBC AUTO: 89.6 FL (ref 80–100)
MONOCYTES ABSOLUTE: 0.2 K/UL (ref 0–1.3)
MONOCYTES RELATIVE PERCENT: 2.1 %
NEUTROPHILS ABSOLUTE: 6.6 K/UL (ref 1.7–7.7)
NEUTROPHILS RELATIVE PERCENT: 90.2 %
PDW BLD-RTO: 15.3 % (ref 12.4–15.4)
PERFORMED ON: ABNORMAL
PHOSPHORUS: 4.3 MG/DL (ref 2.5–4.9)
PLATELET # BLD: 159 K/UL (ref 135–450)
PMV BLD AUTO: 10.4 FL (ref 5–10.5)
POTASSIUM SERPL-SCNC: 4.4 MMOL/L (ref 3.5–5.1)
RBC # BLD: 4.8 M/UL (ref 4.2–5.9)
SODIUM BLD-SCNC: 134 MMOL/L (ref 136–145)
WBC # BLD: 7.3 K/UL (ref 4–11)

## 2021-04-10 PROCEDURE — 84100 ASSAY OF PHOSPHORUS: CPT

## 2021-04-10 PROCEDURE — 99233 SBSQ HOSP IP/OBS HIGH 50: CPT | Performed by: NURSE PRACTITIONER

## 2021-04-10 PROCEDURE — 85730 THROMBOPLASTIN TIME PARTIAL: CPT

## 2021-04-10 PROCEDURE — 90945 DIALYSIS ONE EVALUATION: CPT

## 2021-04-10 PROCEDURE — 6370000000 HC RX 637 (ALT 250 FOR IP): Performed by: INTERNAL MEDICINE

## 2021-04-10 PROCEDURE — 2580000003 HC RX 258: Performed by: INTERNAL MEDICINE

## 2021-04-10 PROCEDURE — 85025 COMPLETE CBC W/AUTO DIFF WBC: CPT

## 2021-04-10 PROCEDURE — 80048 BASIC METABOLIC PNL TOTAL CA: CPT

## 2021-04-10 PROCEDURE — 94760 N-INVAS EAR/PLS OXIMETRY 1: CPT

## 2021-04-10 RX ORDER — ISOSORBIDE MONONITRATE 60 MG/1
60 TABLET, EXTENDED RELEASE ORAL DAILY
Qty: 30 TABLET | Refills: 3 | Status: SHIPPED | OUTPATIENT
Start: 2021-04-10 | End: 2021-04-19 | Stop reason: DRUGHIGH

## 2021-04-10 RX ORDER — CARVEDILOL 25 MG/1
25 TABLET ORAL
Qty: 60 TABLET | Refills: 3 | Status: SHIPPED | OUTPATIENT
Start: 2021-04-10 | End: 2021-10-22

## 2021-04-10 RX ORDER — CARVEDILOL 25 MG/1
50 TABLET ORAL
Qty: 60 TABLET | Refills: 3 | Status: SHIPPED | OUTPATIENT
Start: 2021-04-11 | End: 2022-02-02 | Stop reason: DRUGHIGH

## 2021-04-10 RX ORDER — HYDRALAZINE HYDROCHLORIDE 50 MG/1
50 TABLET, FILM COATED ORAL EVERY 8 HOURS SCHEDULED
Qty: 90 TABLET | Refills: 3 | Status: SHIPPED | OUTPATIENT
Start: 2021-04-10 | End: 2022-02-02 | Stop reason: DRUGHIGH

## 2021-04-10 RX ADMIN — FUROSEMIDE 80 MG: 40 TABLET ORAL at 11:16

## 2021-04-10 RX ADMIN — ASPIRIN 81 MG: 81 TABLET, CHEWABLE ORAL at 08:34

## 2021-04-10 RX ADMIN — CLOPIDOGREL BISULFATE 75 MG: 75 TABLET ORAL at 08:34

## 2021-04-10 RX ADMIN — ISOSORBIDE MONONITRATE 60 MG: 60 TABLET, EXTENDED RELEASE ORAL at 08:34

## 2021-04-10 RX ADMIN — CALCITRIOL 0.25 MCG: 0.25 CAPSULE ORAL at 08:34

## 2021-04-10 RX ADMIN — CARVEDILOL 50 MG: 25 TABLET, FILM COATED ORAL at 08:34

## 2021-04-10 RX ADMIN — AMLODIPINE BESYLATE 5 MG: 5 TABLET ORAL at 08:34

## 2021-04-10 RX ADMIN — RANOLAZINE 500 MG: 500 TABLET, FILM COATED, EXTENDED RELEASE ORAL at 08:33

## 2021-04-10 RX ADMIN — Medication 10 ML: at 08:38

## 2021-04-10 RX ADMIN — HYDRALAZINE HYDROCHLORIDE 50 MG: 25 TABLET, FILM COATED ORAL at 08:38

## 2021-04-10 ASSESSMENT — PAIN SCALES - GENERAL: PAINLEVEL_OUTOF10: 0

## 2021-04-10 NOTE — PROGRESS NOTES
Hospitalist Progress Note      PCP: Bonnie Kruse MD    Date of Admission: 4/8/2021    Chief Complaint: Positive stress test.    Hospital Course: 49 y.o. male with history of with history of type 1 diabetes on insulin pump with peripheral neuropathy, end-stage renal disease on peritoneal dialysis, history of diastolic heart failure, hyperlipidemia, hypertension, CAD and previous CABG who had a history of positive stress test   and admitted for angiogram.           Subjective: Awaiting angiogram this morning. .  Denies any chest pain.       Medications:  Reviewed    Infusion Medications    Insulin Pump - insulin aspart      dextrose      heparin (PORCINE) Infusion 1,400 Units/hr (04/09/21 1412)    sodium chloride       Scheduled Medications    hydrALAZINE  50 mg Oral 3 times per day    amLODIPine  5 mg Oral BID    calcitRIOL  0.25 mcg Oral Daily    carvedilol  25 mg Oral Dinner    clopidogrel  75 mg Oral Daily    furosemide  80 mg Oral BID    isosorbide mononitrate  60 mg Oral Daily    ranolazine  500 mg Oral BID    rosuvastatin  40 mg Oral Nightly    sodium chloride flush  5-40 mL Intravenous 2 times per day    aspirin  81 mg Oral Daily    dianeal lo-miller (ULTRABAG) 2.5%  2,300 mL Intraperitoneal Q6H    gentamicin   Topical Daily    carvedilol  50 mg Oral Daily with breakfast     PRN Meds: glucose, dextrose, glucagon (rDNA), dextrose, heparin (porcine), heparin (porcine), nitroGLYCERIN, sodium chloride flush, sodium chloride, promethazine **OR** ondansetron, acetaminophen **OR** acetaminophen, polyethylene glycol      Intake/Output Summary (Last 24 hours) at 4/9/2021 2010  Last data filed at 4/9/2021 1506  Gross per 24 hour   Intake 240 ml   Output 1165 ml   Net -925 ml       Physical Exam Performed:    /73   Pulse 84   Temp 97.1 °F (36.2 °C) (Temporal)   Resp 18   Ht 5' 8\" (1.727 m)   Wt 200 lb 9.9 oz (91 kg)   SpO2 98%   BMI 30.50 kg/m²     General appearance: No apparent distress, appears stated age and cooperative. HEENT: Pupils equal, round, and reactive to light. Conjunctivae/corneas clear. Neck: Supple, with full range of motion. No jugular venous distention. Trachea midline. Respiratory:  Normal respiratory effort. Clear to auscultation, bilaterally without Rales/Wheezes/Rhonchi. Cardiovascular: Regular rate and rhythm with normal S1/S2 without murmurs, rubs or gallops. Abdomen: Soft, non-tender, non-distended with normal bowel sounds. Musculoskeletal: No clubbing, cyanosis or edema bilaterally. Full range of motion without deformity. Skin: Skin color, texture, turgor normal.  No rashes or lesions. Neurologic:  Neurovascularly intact without any focal sensory/motor deficits. Cranial nerves: II-XII intact, grossly non-focal.  Psychiatric: Alert and oriented, thought content appropriate, normal insight  Capillary Refill: Brisk,< 3 seconds   Peripheral Pulses: +2 palpable, equal bilaterally       Labs:   Recent Labs     04/08/21  1345 04/09/21  0348   WBC 5.0 4.7   HGB 14.5 14.7   HCT 45.6 45.6    158     Recent Labs     04/08/21  1345 04/09/21  1008    139   K 3.9 3.6   CL 99 102   CO2 28 24   BUN 53* 51*   CREATININE 7.7* 7.1*   CALCIUM 8.2* 8.2*   PHOS  --  4.9     Recent Labs     04/08/21  1345   AST 12*   ALT 13   BILITOT <0.2   ALKPHOS 111     No results for input(s): INR in the last 72 hours. Recent Labs     04/08/21  1345 04/08/21  2220   TROPONINI 0.19* 0.17*       Urinalysis:      Lab Results   Component Value Date    NITRU Negative 05/01/2020    WBCUA 0 05/01/2020    BACTERIA Rare 05/25/2010    RBCUA 29 05/01/2020    BLOODU SMALL 05/01/2020    SPECGRAV 1.012 05/01/2020    GLUCOSEU 250 05/01/2020    GLUCOSEU >=1000 05/25/2010       Radiology:  XR CHEST PORTABLE   Final Result   Borderline cardiomegaly. No convincing evidence for acute cardiopulmonary   pathology.                  Assessment/Plan:    Active Hospital Problems    Diagnosis    Unstable angina ([facility] [I20.0]    ESRD on peritoneal dialysis ([facility] [N18.6, Z99.2]    HTN (hypertension), benign [I10]    Pure hypercholesterolemia [E78.00]    Type 1 diabetes mellitus with chronic kidney disease ([facility] [E10.22]    PVD (peripheral vascular disease) ([facility] [I73.9]     Abnormal stress test: Angiogram today-PCI of SVG-OM1 with 3.5X23 Xience MARIIA (PD with 3.75NC throughout) done. Continue aspirin, Plavix, Coreg, Ranexa, Crestor, Imdur    History of coronary artery disease status post CABG    ESRD: Hemodialysis per nephrology.     Type 2 diabetes mellitus: Continue insulin drip per patient    Hypertension: Controlled    Hyperlipidemia: Statin    History of diastolic heart failure: Compensated    Peripheral vascular disease         DVT Prophylaxis: per cards  Diet: DIET CARDIAC; No Caffeine  Code Status: Full Code    PT/OT Eval Status: Not needed    Dispo -inpatient 1 more day   Karin Powers MD

## 2021-04-10 NOTE — PROGRESS NOTES
Intraperitoneal Q6H    gentamicin   Topical Daily    carvedilol  50 mg Oral Daily with breakfast     Continuous Infusions:   Insulin Pump - insulin aspart      dextrose      heparin (PORCINE) Infusion 1,400 Units/hr (04/09/21 1412)    sodium chloride       PRN Meds:glucose, dextrose, glucagon (rDNA), dextrose, heparin (porcine), heparin (porcine), nitroGLYCERIN, sodium chloride flush, sodium chloride, promethazine **OR** ondansetron, acetaminophen **OR** acetaminophen, polyethylene glycol     Past Medical History:  Past Medical History:   Diagnosis Date    [facility]     Cardiomyopathy ([facility]     Carotid artery stenosis 10/25/2016    SUZANNA stented with 8 x 30 mm non-tapered Xact stent    CHF (congestive heart failure) ([facility] 3/3/15    EF 30%     CKD (chronic kidney disease) stage 2, GFR 60-89 ml/min     Coronary artery disease     sp CABG UC    Diabetic peripheral neuropathy [facility]     Diastolic HF (heart failure) ([facility])     Hyperlipidemia with target LDL less than 70     Hypertension goal BP (blood pressure) < 130/80     PVD (peripheral vascular disease) ([facility])     Seizures ([facility])     Type 1 diabetes mellitus with chronic kidney disease ([facility]     c-peptide <0.1 in 2015        Past Surgical History:    has a past surgical history that includes Cardiac surgery; Coronary artery bypass graft; Cardiac catheterization; hernia repair; Tonsillectomy; LAPAROSCOPY INSERTION PERITONEAL CATHETER (N/A, 6/3/2020); and Carotid stent placement (Right). Social History:  Reviewed. reports that he has never smoked. He has never used smokeless tobacco. He reports current alcohol use. He reports that he does not use drugs. Family History:  Reviewed. family history includes Arthritis in his brother; Coronary Art Dis in his father and paternal grandmother; Diabetes in his father and paternal grandmother; Hypertension in his mother; Mult Sclerosis in his brother;  Other in his mother and sister; Sleep Apnea in his brother. Review of Systems:  · Constitutional: Negative for fever, night sweats, chills, weight changes, or weakness  · Skin: Negative for rash, dry skin, pruritus, bruising, bleeding, blood clots, or changes in skin pigment  · HEENT: Negative for vision changes, ringing in the ears, sore throat, dysphagia, or swollen lymph nodes  · Respiratory: Reviewed in HPI  · Cardiovascular: Reviewed in HPI  · Gastrointestinal: Negative for abdominal pain, N/V/D, constipation, or black/tarry stools  · Genito-Urinary: Negative for dysuria, incontinence, urgency, or hematuria  · Musculoskeletal: Negative for joint swelling, muscle pain, or injuries  · Neurological/Psych: Negative for confusion, seizures, headaches, balance issues or TIA-like symptoms. No anxiety, depression, or insomnia    Physical Examination:  Vitals:    04/10/21 0400   BP: 130/63   Pulse: 94   Resp: 16   Temp:    SpO2:       In: 960 [P.O.:960]  Out: 600    Wt Readings from Last 3 Encounters:   04/09/21 200 lb 9.9 oz (91 kg)   04/07/21 200 lb 9.6 oz (91 kg)   03/24/21 194 lb (88 kg)       Intake/Output Summary (Last 24 hours) at 4/10/2021 0736  Last data filed at 4/10/2021 0600  Gross per 24 hour   Intake 960 ml   Output 600 ml   Net 360 ml       Telemetry: Personally Reviewed  - Sinus rhythm   · Constitutional: Cooperative and in no apparent distress, and appears well nourished  · Skin: Warm and pink; no pallor, cyanosis, bruising, or clubbing. Right groin cath site stable, no hematoma/oozing/swelling. · HEENT: Symmetric and normocephalic. PERRL, EOM intact. Conjunctiva pink with clear sclera. Mucus membranes pink and moist. Teeth intact. Thyroid smooth without nodules or goiter. · Cardiovascular: Regular rate and rhythm. S1/S2 present without murmurs, rubs, or gallops. Peripheral pulses 2+, capillary refill < 3 seconds. No elevation of JVP. No peripheral edema  · Respiratory: Respirations symmetric and unlabored.  Lungs clear to auscultation bilaterally, no wheezing, crackles, or rhonchi  · Gastrointestinal: Abdomen soft and round. Bowel sounds normoactive in all quadrants without tenderness or masses. · Musculoskeletal: Bilateral upper and lower extremity strength 5/5 with full ROM  · Neurologic/Psych: Awake and orientated to person, place and time. Calm affect, appropriate mood    Pertinent labs, diagnostic, device, and imaging results reviewed as a part of this visit    Labs:    BMP:   Recent Labs     21  1345 21  1008 04/10/21  0429    139 134*   K 3.9 3.6 4.4   CL 99 102 97*   CO2 28 24 24   PHOS  --  4.9 4.3   BUN 53* 51* 56*   CREATININE 7.7* 7.1* 8.1*   MG  --  2.10  --      Estimated Creatinine Clearance: 11 mL/min (A) (based on SCr of 8.1 mg/dL [facility])). CBC:   Recent Labs     21  1345 21  0348 04/10/21  0429   WBC 5.0 4.7 7.3   HGB 14.5 14.7 13.9   HCT 45.6 45.6 43.0   MCV 88.8 88.6 89.6    158 159     Thyroid:   Lab Results   Component Value Date    TSH 1.60 2020     Lipids:   Lab Results   Component Value Date    CHOL 210 2021    HDL 40 2021    TRIG 85 2021     LFTS:   Lab Results   Component Value Date    ALT 13 2021    AST 12 2021    ALKPHOS 111 2021    PROT 7.3 2021    PROT 7.9 2010    AGRATIO 1.3 2021    BILITOT <0.2 2021     Cardiac Enzymes:   Lab Results   Component Value Date    CKTOTAL 143 2020    CKMB 1.92 2010    CKMBINDEX 1.8 2010    TROPONINI 0.17 2021    TROPONINI 0.19 2021    TROPONINI 0.04 2021     Coags:   Lab Results   Component Value Date    PROTIME 12.1 2021    INR 1.04 2021       EC/8/21  NSR with T wave abnormality    ECHO: 3/2/21    Definity contrast agent was used to help visualize endocardial borders. Left ventricular cavity size is moderately dilated. Overall left ventricular systolic function appears severely reduced.    Ejection fraction is visually estimated to be 30-35%. There is moderate hypokinesis of the basal-mid septal, basal-mid lateral, basal-mid inferior and basal-mid inferolateral walls. Grade I diastolic dysfunction with normal LV filling pressures. Mild mitral regurgitation. A bubble study was performed and fails to show evidence of shunting.  Compared to previous echo, LVEF now reduced    Cath: 4/9/21  Findings:  Artery Findings/Result  LM Normal  % ostial  Cx 100% mid  % ostial  RCA Nondominant, Smaller RV marginal with mid 70% and distal 90% lesions supplying collateral to distal Cx (old finding), small but could potentially be stented if symptoms merit  S-OM Patent, 99% ostial with ABEL 1 flow  L-LAD patent  LVEDP 6  LVG 35%, inferior hk     Intervention:         PCI of SVG-OM1 with 3.5X23 Xience MARIIA (PD with 3.75NC throughout)     Post Cath Dx:       Severe disease as above, culprit SVG    Problem List:   Patient Active Problem List    Diagnosis Date Noted    NSTEMI (non-ST elevated myocardial infarction) ([address] 05/08/2018     Priority: High    Encephalopathy      Priority: High    DKA (diabetic ketoacidoses) ([address] 05/25/2010     Priority: High    Acute kidney injury superimposed on chronic kidney disease ([address] 05/26/2010     Priority: Medium    Unstable angina ([address] 04/08/2021    ESRD on peritoneal dialysis ([address] 04/08/2021    Hypersomnia 03/24/2021    Moderate obstructive sleep apnea 03/24/2021    Snoring 03/24/2021    Arterial ischemic stroke, ICA, right, acute ([address]     DM (diabetes mellitus), type 2, uncontrolled with complications ([address]     End-stage renal disease on peritoneal dialysis ([address]     Left leg weakness 03/01/2021    Acute on chronic combined systolic and diastolic CHF (congestive heart failure) ([address]     DKA, type 1, not at goal [facility]) 10/23/2019    NSVT (nonsustained ventricular tachycardia) ([address] 07/29/2019    Atrial fibrillation with rapid ventricular response ([address] 07/25/2019    Syncope     Stage 4 chronic kidney disease ([facility]     S/P CABG (coronary artery bypass graft) 05/24/2018    Ischemic cardiomyopathy 05/24/2018    Venous insufficiency of both lower extremities 05/24/2018    HTN (hypertension), benign     Acute encephalopathy 05/06/2018    Generalized idiopathic epilepsy, not intractable, without status epilepticus ([facility]     Abnormal cardiovascular stress test 07/06/2017    Carotid artery calcification     H/O carotid angioplasty     Pure hypercholesterolemia     Stenosis of right carotid artery     Chest pain 05/18/2016    Diabetic nephropathy associated with type 1 diabetes mellitus ([facility] 65/71/4143    Diastolic HF (heart failure) [facility]     Dyslipidemia     CKD (chronic kidney disease) stage 2, GFR 60-89 ml/min     Type 1 diabetes mellitus with chronic kidney disease ([facility]     Diabetic peripheral neuropathy ([facility])     Carotid artery stenosis     PVD (peripheral vascular disease) ([facility]     Hypoglycemia     CHF (congestive heart failure) ([facility]     CRI (chronic renal insufficiency) ([facility]) 03/03/2015    PNA (pneumonia) 03/02/2015    Coronary artery disease involving native heart with angina pectoris ([facility] 05/26/2010    DM type 1 (diabetes mellitus, type 1) ([facility] 05/26/2010        Assessment and Plan:     1. CAD, NSTEMI  - S/p PCI (4/9/21)   ~ Right groin cath site stable, no swelling/hematoma/oozing   ~ Discussed post PCI discharge instructions, pt VU    - Stable  - No complaints of angina  - Continue ASA, Plavix, BB, Ranexa, and statin    2. HTN  - Controlled: Goal <130/80  - Continue current medications    3.  Chronic systolic heart failure (NYHA Class II), ICM  - Appears compensated   ~ EF 30-35% per echo (3/21)  - Continue with coreg 25 mg QAM/50 mg QPM, hydralazine 50 mg TID, and lasix 80 mg BID (per nephro)  ~ ACE-I/ARB contraindicated due to renal insufficiency and risk of hyperkalemia  - Aggressive medical therapy with risk factor modification  - Discussed with patient importance of monitoring weight, low sodium diet and fluid restriction    - Will need echo in 3-6 months post PCI on OMT. If EF remains low (<35%), then may need to consider AICD placement    4. ESRD   - On hemodialysis   - Nephrology following    5. Hx of TIA/CVA   - On ASA, plavix, statin    No further cardiology recommendations. Pt will need one week follow up in office with NPTS. EP will sign off. Please call if there are any questions. Thank you for consult. All pertinent information and plan of care discussed with the EP physician. All questions and concerns were addressed to the patient. Alternatives to my treatment were discussed. I have discussed the above stated plan with patient and the nurse. The patient verbalized understanding and agreed with the plan. Thank you for allowing to us to participate in the care of Angela Paul     The patient was seen for >35 minutes. I reviewed interval history, physical exam, review of data including labs, imaging, development and implementation of treatment plan and coordination of complex care. More than 50% of the time was devoted to giving the patient detailed instructions instructions on addressing diet, regular exercise, weight control, smoking abstention, medication compliance, and stress minimization. Patient was provided written and verbal instructions regarding risk factor modification.      JAYLEN Bruce-CNP  [facility]   Office: [phone]

## 2021-04-10 NOTE — DISCHARGE SUMMARY
Physician Discharge Summary     Patient ID:  Andre Shukla  [id number]  53 y.o.  1965    Admit date: 4/8/2021    Discharge date and time: 4/10/2021  2:15 PM     Admitting Physician: Rae Mcneill MD     Discharge Physician: Nohemi Ramos MD    Admission Diagnoses: Unstable angina [facility]) [I20.0]    Discharge Diagnoses: NSTEMI    Admission Condition: fair    Discharged Condition: good    Indication for Admission: R/o ACS    Hospital Course:   49 y. o. male with history of with history of type 1 diabetes on insulin pump with peripheral neuropathy, end-stage renal disease on peritoneal dialysis, history of diastolic heart failure, hyperlipidemia, hypertension, CAD and previous CABG who had a history of positive stress test and admitted for angiogram. Seen by cardiology. Had angiogram-PCI of SVG-OM1 with 3.5X23 Xience MARIIA (PD with 3.75NC throughout) done. Nephrology managed ESRD on HD. Ok to d/c from cardiology stand point. Discharged on ASA, Plavix, BB, Ranexa, and statin. Continued on coreg 25 mg QAM/50 mg QPM, hydralazine 50 mg TID, and lasix 80 mg BID (per nephro) for chronic systolic heart failure. Will need echo in 3-6 months post PCI on OMT.  If EF remains low (<35%), then may need to consider AICD placement.       Consults: cardiology and nephrology    Discharge Exam:  /60   Pulse 76   Temp 96.6 °F (35.9 °C) (Temporal)   Resp 17   Ht 5' 8\" (1.727 m)   Wt 194 lb 3.6 oz (88.1 kg)   SpO2 92%   BMI 29.53 kg/m²     General Appearance:    Alert, cooperative, no distress, appears stated age   Head:    Normocephalic, without obvious abnormality, atraumatic   Eyes:    PERRL, conjunctiva/corneas clear, EOM's intact, fundi     benign, both eyes        Ears:    Normal TM's and external ear canals, both ears   Nose:   Nares normal, septum midline, mucosa normal, no drainage    or sinus tenderness   Throat:   Lips, mucosa, and tongue normal; teeth and gums normal   Neck:   Supple, symmetrical, trachea midline, no adenopathy;        thyroid:  No enlargement/tenderness/nodules; no carotid    bruit or JVD   Back:     Symmetric, no curvature, ROM normal, no CVA tenderness   Lungs:     Clear to auscultation bilaterally, respirations unlabored   Chest wall:    No tenderness or deformity   Heart:    Regular rate and rhythm, S1 and S2 normal, no murmur, rub   or gallop   Abdomen:     Soft, non-tender, bowel sounds active all four quadrants,     no masses, no organomegaly   Genitalia:    Normal male without lesion, discharge or tenderness   Rectal:    Normal tone, normal prostate, no masses or tenderness;    guaiac negative stool   Extremities:   Extremities normal, atraumatic, no cyanosis or edema   Pulses:   2+ and symmetric all extremities   Skin:   Skin color, texture, turgor normal, no rashes or lesions   Lymph nodes:   Cervical, supraclavicular, and axillary nodes normal   Neurologic:   CNII-XII intact. Normal strength, sensation and reflexes       throughout       Disposition: home    In process/preliminary results:  Outstanding Order Results     Date and Time Order Name Status Description    4/9/2021 0545 Culture, Fungus In process     4/9/2021 0545 Culture, Body Fluid Preliminary           Patient Instructions:   Current Discharge Medication List      CONTINUE these medications which have CHANGED    Details   hydrALAZINE (APRESOLINE) 50 MG tablet Take 1 tablet by mouth every 8 hours  Qty: 90 tablet, Refills: 3      !! carvedilol (COREG) 25 MG tablet Take 1 tablet by mouth Daily with supper  Qty: 60 tablet, Refills: 3      !! carvedilol (COREG) 25 MG tablet Take 2 tablets by mouth daily (with breakfast)  Qty: 60 tablet, Refills: 3       !! - Potential duplicate medications found. Please discuss with provider.       CONTINUE these medications which have NOT CHANGED    Details   Continuous Blood Gluc Sensor (DEXCOM G6 SENSOR) MISC 1 Device by Does not apply route daily  Qty: 1 each, Refills: 0      nitroGLYCERIN (NITROSTAT) 0.4 MG SL tablet DISSOLVE 1 TAB UNDER TONGUE FOR CHEST PAIN - IF PAIN REMAINS AFTER 5 MIN, CALL 911 AND REPEAT DOSE. MAX 3 TABS IN 15 MINUTES  Qty: 25 tablet, Refills: 1      isosorbide mononitrate (IMDUR) 60 MG extended release tablet Take 1 tablet by mouth daily  Qty: 90 tablet, Refills: 0      aspirin 81 MG EC tablet Take 1 tablet by mouth daily  Qty: 30 tablet, Refills: 3      clopidogrel (PLAVIX) 75 MG tablet Take 1 tablet by mouth daily  Qty: 30 tablet, Refills: 3      rosuvastatin (CRESTOR) 40 MG tablet Take 1 tablet by mouth nightly  Qty: 30 tablet, Refills: 3      Cholecalciferol (VITAMIN D3 ULTRA POTENCY) 1.25 MG (28566 UT) TABS Take 50,000 Units by mouth once a week (on Saturdays)      calcitRIOL (ROCALTROL) 0.25 MCG capsule Take 0.25 mcg by mouth daily      ranolazine (RANEXA) 500 MG extended release tablet TAKE ONE TABLET BY MOUTH TWICE A DAY  Qty: 60 tablet, Refills: 2    Associated Diagnoses: Coronary artery disease due to lipid rich plaque      blood glucose test strips (ACCU-CHEK GUIDE) strip TEST BLOOD SUGAR 6 TIMES A DAY  Qty: 200 strip, Refills: 5      furosemide (LASIX) 80 MG tablet Take 80 mg by mouth 2 times daily      EPINEPHrine (EPIPEN 2-JESSICA) 0.3 MG/0.3ML SOAJ injection Inject 0.3 mLs into the skin once for 1 dose Use as directed for allergic reaction  Qty: 0.3 mL, Refills: 0      Methylcobalamin (B-12) 1000 MCG TBDP Place 1,000 mcg under the tongue daily      insulin aspart (NOVOLOG FLEXPEN) 100 UNIT/ML injection pen Inject 0-7 Units into the skin 3 times daily (before meals) Per patient sliding scale. STOP taking these medications       amLODIPine (NORVASC) 5 MG tablet Comments:   Reason for Stopping:             Activity: activity as tolerated  Diet: cardiac diet  Wound Care: none needed    Follow-up with pcpc, cardiology, nephrology in 2 weeks.     Signed:  Edie Presley MD  Time spent > 35 mins  4/11/2021  2:57 PM

## 2021-04-10 NOTE — PROGRESS NOTES
ACT drawn and resulted 171. PTT = 32.1, Patient instructed on removal procedure. Right Femoral Arterial sheath site without hematoma or oozing. Arterial sheath removed per policy without difficulty. Integrity of sheath inspected upon removal and no abnormalities noted. Manual pressure held X 20 minutes. Dry, sterile tegaderm applied. Pressure dressing applied. Patient tolerated well. Vital signs, groin checks, and pedal pulses will be completed per protocol (every 15 minutes X 4, every 30 minutes X 2, and every hour X 2 per protocol).     Sheath removed by AS RN

## 2021-04-10 NOTE — FLOWSHEET NOTE
, site soft, no hematoma and sheath intact. Pedal pulses verified with doppler. PTT drawn per protocol to verify ACT accuracy. Repositioned on left side, maintaining straight leg. Will cont to nava.

## 2021-04-10 NOTE — PROGRESS NOTES
Nephrology Consult Note  [phone]   [email or web address]        Reason for Consult:  ESKD    HISTORY OF PRESENT ILLNESS:                This is a patient with significant past medical history of ESKD on CCPD, T1DM, HTN, CAD s/p CABG, CHF, PVD who presented to ER for chest pain. Pt had a stress test today. He experienced chest pain during stress portion of exam.  There is reversible defect noted. He reports progressively worsening symptoms for the past few months, now having pain at rest.  He denies any SOB, fever/chills/N/V. Does have chronic BLE that is stable. He reports PD is going well.    -Tp is positive-started on heparin      Subjective:  -pt seen and examined  -PMSHx and meds reviewed  -No family at bedside    Feeling a lot better  Dyspnea much better     PHYSICAL EXAM:    Vitals:    /60   Pulse 76   Temp 96.6 °F (35.9 °C) (Temporal)   Resp 17   Ht 5' 8\" (1.727 m)   Wt 194 lb 3.6 oz (88.1 kg)   SpO2 92%   BMI 29.53 kg/m²   I/O last 3 completed shifts: In: 960 [P.O.:960]  Out: 600 [Urine:600]  I/O this shift:  In: -   Out: 7629 [Urine:350]    Physical Exam:  Gen: Resting in bed, NAD. HEENT: anicteric, NC/AT  CV: +S1/S2, RRR  Lungs: Good respiratory effort, clear air entry   Abd: NTND, PD catheter RLQ dressing C/D/I  Ext: trace edema, no cyanosis  Skin: Warm. No rashes appreciated.      DATA:    CBC:   Lab Results   Component Value Date    WBC 7.3 04/10/2021    RBC 4.80 04/10/2021    HGB 13.9 04/10/2021    HCT 43.0 04/10/2021    MCV 89.6 04/10/2021    MCH 29.0 04/10/2021    MCHC 32.4 04/10/2021    RDW 15.3 04/10/2021     04/10/2021    MPV 10.4 04/10/2021     BMP:    Lab Results   Component Value Date     04/10/2021    K 4.4 04/10/2021    K 3.9 04/08/2021    CL 97 04/10/2021    CO2 24 04/10/2021    BUN 56 04/10/2021    LABALBU 4.1 04/08/2021    CREATININE 8.1 04/10/2021    CALCIUM 8.2 04/10/2021    GFRAA 8 04/10/2021    GFRAA 44 06/15/2013    LABGLOM 7 04/10/2021 GLUCOSE 417 04/10/2021       IMPRESSION/RECOMMENDATIONS:      ESKD: [facility] nephrologist Dr. Orestes Herman therapy time, fill volume 2.3L, total 3 exchanges-no LBF  -ON no issues with PD-tolerated   -ok to d/c.  Will resume his outpatient PD as previously prescribed     FEN:  -reviewed     NSTEMI: Tp trend noted-stress test positive  -started on heparin drip  -plan for LHC today    CAD: h/o CABG in the past    HTN: Bp reasonable     Anemia: no PRISCILLA needed    CKD-MBD: check phos, continue calcitriol    DM1: has insulin pump       Rossy Brown MD

## 2021-04-10 NOTE — FLOWSHEET NOTE
04/10/21 0742   Vitals   BP (!) 151/63   Temp 98.4 °F (36.9 °C)   Pulse 86   Resp 16   Weight 194 lb 3.6 oz (88.1 kg)     Disconnected from CCPD per protocol. Effluent: clear yellow without fibrin  Total time: 08:03  Total UF:  1046 ml. Total Volume:  6885 ml.   Pt Tolerated procedure: No problems  Report given to: Crow Daley RN  Last BM: 4-8-2021

## 2021-04-12 ENCOUNTER — TELEPHONE (OUTPATIENT)
Dept: CARDIOLOGY CLINIC | Age: 56
End: 2021-04-12

## 2021-04-12 LAB
BODY FLUID CULTURE, STERILE: NORMAL
GRAM STAIN RESULT: NORMAL

## 2021-04-12 RX ORDER — RANOLAZINE 500 MG/1
TABLET, EXTENDED RELEASE ORAL
Qty: 180 TABLET | Refills: 1 | Status: SHIPPED | OUTPATIENT
Start: 2021-04-12 | End: 2022-03-28

## 2021-04-13 LAB
POC ACT LR: 169 SEC
POC ACT LR: 253 SEC

## 2021-04-14 ENCOUNTER — HOSPITAL ENCOUNTER (OUTPATIENT)
Dept: PHYSICAL THERAPY | Age: 56
Setting detail: THERAPIES SERIES
Discharge: HOME | End: 2021-04-14
Payer: COMMERCIAL

## 2021-04-14 NOTE — FLOWSHEET NOTE
[address] [facility]    Physical Therapy  Cancellation/No-show Note  Patient Name:  Patricia Centeno  :  1965   Date:  2021    Cancelled visits to date: 6  No-shows to date: 0    For today's appointment patient:  [x]  Cancelled 3/17, 3/19, 3/31, , ,   []  Rescheduled appointment  []  No-show     Reason given by patient:  []  Patient ill  []  Conflicting appointment  []  No transportation    []  Conflict with work  []  No reason given  [x]  Other:     Comments: PT had  contact pt to cancel today () as pt was recently discharged from hospital. Left message stating that pt should follow up with PCP to get clearance before returning to PT. Phone call information:   [x]  Phone call made today to patient at _ time at number provided:      []  Patient answered, conversation as follows:      [x]  Patient did not answer, message left as follows: See above   []  Phone call not made today  []  Phone call not needed - pt contacted us to cancel and provided reason for cancellation.      Electronically signed by:  Nimesh Arce, PT DPT

## 2021-04-16 ENCOUNTER — HOSPITAL ENCOUNTER (OUTPATIENT)
Dept: OCCUPATIONAL THERAPY | Age: 56
Setting detail: THERAPIES SERIES
Discharge: HOME | End: 2021-04-16
Payer: COMMERCIAL

## 2021-04-16 ENCOUNTER — HOSPITAL ENCOUNTER (OUTPATIENT)
Dept: PHYSICAL THERAPY | Age: 56
Setting detail: THERAPIES SERIES
Discharge: HOME | End: 2021-04-16
Payer: COMMERCIAL

## 2021-04-16 NOTE — FLOWSHEET NOTE
[address] [facility]    Physical Therapy  Cancellation/No-show Note  Patient Name:  Christine Cline  :  1965   Date:  2021    Cancelled visits to date: 7  No-shows to date: 0    For today's appointment patient:  [x]  Cancelled 3/17, 3/19, 3/31, , , ,   []  Rescheduled appointment  []  No-show     Reason given by patient:  []  Patient ill  []  Conflicting appointment  []  No transportation    []  Conflict with work  []  No reason given  [x]  Other:     Comments:  See below    Phone call information:   [x]  Phone call made today to patient at _ time at number provided:      [x]  Patient answered, conversation as follows:   OT called patient and had the following conversation: Discussed pt's current medical status with pt reporting he has been told by physician to hold therapy at this time, at least until f/u next week following hospitalization for NSTEMI. Discussed other no shows with pt with pt reporting he is feeling \"so much better\" than before starting therapy and agreeing to cancel remaining visits at this time and f/u with OT/PT if needed. []  Patient did not answer, message left as follows: See above   []  Phone call not made today  []  Phone call not needed - pt contacted us to cancel and provided reason for cancellation.      Electronically signed by:  Matt Dangelo, PT DPT

## 2021-04-19 ENCOUNTER — OFFICE VISIT (OUTPATIENT)
Dept: CARDIOLOGY CLINIC | Age: 56
End: 2021-04-19
Payer: COMMERCIAL

## 2021-04-19 VITALS
HEIGHT: 68 IN | WEIGHT: 195 LBS | DIASTOLIC BLOOD PRESSURE: 52 MMHG | BODY MASS INDEX: 29.55 KG/M2 | OXYGEN SATURATION: 98 % | HEART RATE: 84 BPM | SYSTOLIC BLOOD PRESSURE: 102 MMHG

## 2021-04-19 DIAGNOSIS — I25.5 ISCHEMIC CARDIOMYOPATHY: ICD-10-CM

## 2021-04-19 DIAGNOSIS — I10 HTN (HYPERTENSION), BENIGN: ICD-10-CM

## 2021-04-19 DIAGNOSIS — I25.10 CORONARY ARTERY DISEASE INVOLVING NATIVE CORONARY ARTERY OF NATIVE HEART WITHOUT ANGINA PECTORIS: Primary | ICD-10-CM

## 2021-04-19 DIAGNOSIS — E78.2 MIXED HYPERLIPIDEMIA: ICD-10-CM

## 2021-04-19 PROCEDURE — G8427 DOCREV CUR MEDS BY ELIG CLIN: HCPCS | Performed by: NURSE PRACTITIONER

## 2021-04-19 PROCEDURE — 3017F COLORECTAL CA SCREEN DOC REV: CPT | Performed by: NURSE PRACTITIONER

## 2021-04-19 PROCEDURE — 99214 OFFICE O/P EST MOD 30 MIN: CPT | Performed by: NURSE PRACTITIONER

## 2021-04-19 PROCEDURE — 1036F TOBACCO NON-USER: CPT | Performed by: NURSE PRACTITIONER

## 2021-04-19 PROCEDURE — G8417 CALC BMI ABV UP PARAM F/U: HCPCS | Performed by: NURSE PRACTITIONER

## 2021-04-19 PROCEDURE — 1111F DSCHRG MED/CURRENT MED MERGE: CPT | Performed by: NURSE PRACTITIONER

## 2021-04-19 RX ORDER — EZETIMIBE 10 MG/1
10 TABLET ORAL DAILY
Qty: 30 TABLET | Refills: 3 | Status: SHIPPED | OUTPATIENT
Start: 2021-04-19 | End: 2021-10-12

## 2021-04-19 RX ORDER — ISOSORBIDE MONONITRATE 60 MG/1
30 TABLET, EXTENDED RELEASE ORAL DAILY
Qty: 30 TABLET | Refills: 3 | Status: SHIPPED
Start: 2021-04-19 | End: 2021-10-22

## 2021-04-19 NOTE — PATIENT INSTRUCTIONS
Decrease isosorbide back to 30 mg daily since your BP is on the low side    appt in June as scheduled

## 2021-04-19 NOTE — PROGRESS NOTES
[facility]     Outpatient Follow Up Note    CHIEF COMPLAINT / HPI: Hospital Follow Up secondary to status post coronary angioplasty    Hospital record has been reviewed  Hospital Course progressed as follows per discharge summary: Elective procedure d/t abnl myoview stress     history of positive stress test and admitted for angiogram.   ~Had angiogram-PCI of SVG-OM1 with 3.5X23 Xience MARIIA (PD with 3.75NC throughout) done. Nephrology managed ESRD on HD. Ok to d/c from cardiology stand point. Discharged on ASA, Plavix, BB, Ranexa, and statin. Continued on coreg 25 mg QAM/50 mg QPM, hydralazine 50 mg TID, and lasix 80 mg BID (per nephro) for chronic systolic heart failure. Will need echo in 3-6 months post PCI on OMT. If EF remains low (<35%), then may need to consider AICD placement. Vonda Lakhani is 49 y.o. male who presents today for a routine follow up after a recent hospitalization related to the above mentioned issues. Subjective:   Since the time of discharge, the patient admits their symptoms have improved. He denies significant chest pain. There is no SOB/MOISE. The patient is not experiencing palpitations. Last week, while driving, he became light headed and started to vomit. He was dehydrated and lasix stopped; dialysate was changed to 1/2 amt (1.3 L taken off every cycle and now taking off only a 1/3 of that). He's feeling better. He continues to void. His BP this morning : before med 132/78  His occupational therapy has ended and ready to get out and walk    These symptoms are improving over the last many days. With regard to medication therapy the patient has been compliant with prescribed regimen. They have tolerated therapy to date. Recent PMH: TIA-Rt arterial ischemic stroke.  SUZANNA 50-70%, SUZANNA < 50%; ischemic CM; and HTN  Referral made to  transplant for a kidney-pancreas transplant    Past Medical History:   Diagnosis Date    Angina at rest [facility])     Cardiomyopathy ([facility]     Carotid artery stenosis 10/25/2016    SUZANNA stented with 8 x 30 mm non-tapered Xact stent    CHF (congestive heart failure) ([facility] 3/3/15    EF 30%     CKD (chronic kidney disease) stage 2, GFR 60-89 ml/min     Coronary artery disease     sp CABG UC    [facility]     Diastolic HF (heart failure) [facility]     Hyperlipidemia with target LDL less than 70     Hypertension goal BP (blood pressure) < 130/80     PVD (peripheral vascular disease) ([facility])     Seizures ([facility])     Type 1 diabetes mellitus with chronic kidney disease ([facility])     c-peptide <0.1 in 2015     Social History:    Social History     Tobacco Use   Smoking Status Never Smoker   Smokeless Tobacco Never Used     Current Medications:  Current Outpatient Medications   Medication Sig Dispense Refill    ranolazine (RANEXA) 500 MG extended release tablet TAKE ONE TABLET BY MOUTH TWICE A  tablet 1    hydrALAZINE (APRESOLINE) 50 MG tablet Take 1 tablet by mouth every 8 hours 90 tablet 3    carvedilol (COREG) 25 MG tablet Take 1 tablet by mouth Daily with supper 60 tablet 3    carvedilol (COREG) 25 MG tablet Take 2 tablets by mouth daily (with breakfast) 60 tablet 3    isosorbide mononitrate (IMDUR) 60 MG extended release tablet Take 1 tablet by mouth daily 30 tablet 3    aspirin 81 MG EC tablet Take 1 tablet by mouth daily 30 tablet 3    clopidogrel (PLAVIX) 75 MG tablet Take 1 tablet by mouth daily 30 tablet 3    rosuvastatin (CRESTOR) 40 MG tablet Take 1 tablet by mouth nightly 30 tablet 3    Cholecalciferol (VITAMIN D3 ULTRA POTENCY) 1.25 MG (89794 UT) TABS Take 50,000 Units by mouth once a week (on Saturdays)      calcitRIOL (ROCALTROL) 0.25 MCG capsule Take 0.25 mcg by mouth daily 1 tablet by mouth daily  m-f ; 1 tablet by mouth twice daily Saturday and Snday      insulin aspart (NOVOLOG FLEXPEN) 100 UNIT/ML injection pen Inject 0-7 Units into the skin 3 times daily (before meals) Per developed  NEUROLOGIC:  Awake and orientated to person, place and time. PSYCH: Calm affect. SKIN: Warm and dry: Rt groin unremarkable  HEENT: Sclera non-icteric, normocephalic, neck supple, no elevation of JVP, normal carotid pulses with no bruits and thyroid normal size. LUNGS:  No increased work of breathing and clear to auscultation, no crackles or wheezing. CARDIOVASCULAR:  Regular rate 80 and rhythm with no murmurs, gallops, rubs, or abnormal heart sounds, normal PMI. The apical impulses not displaced. Heart tones are crisp and normal                                                                                            Cervical veins are not engorged                 JVP less than 8 cm H2O                                                                              The carotid upstroke is normal in amplitude and contour without delay or bruit    ABDOMEN:  Normal bowel sounds, non-distended and non-tender to palpation   EXT: No edema, no calf tenderness. Pulses are present bilaterally.     DATA:    Lab Results   Component Value Date    ALT 13 04/08/2021    AST 12 (L) 04/08/2021    ALKPHOS 111 04/08/2021    BILITOT <0.2 04/08/2021     Lab Results   Component Value Date    CREATININE 8.1 (HH) 04/10/2021    BUN 56 (H) 04/10/2021     (L) 04/10/2021    K 4.4 04/10/2021    CL 97 (L) 04/10/2021    CO2 24 04/10/2021     Lab Results   Component Value Date    WBC 7.3 04/10/2021    HGB 13.9 04/10/2021    HCT 43.0 04/10/2021    MCV 89.6 04/10/2021     04/10/2021     No components found for: CHLPL  Lab Results   Component Value Date    TRIG 85 03/02/2021    TRIG 69 05/16/2020    TRIG 75 07/25/2019     Lab Results   Component Value Date    HDL 40 03/02/2021    HDL 47 05/16/2020    HDL 45 07/25/2019     Lab Results   Component Value Date    LDLCALC 153 (H) 03/02/2021    LDLCALC 54 05/16/2020    LDLCALC 125 (H) 07/25/2019     Lab Results   Component Value Date    LABVLDL 17 03/02/2021    LABVLDL 14 05/16/2020    LABVLDL 15 07/25/2019     Radiology Review:  Pertinent images / reports were reviewed as a part of this visit and reveals the following:        Last Echo: 3/2/21:  Summary   Definity contrast agent was used to help visualize endocardial borders.   Left ventricular cavity size is moderately dilated.   Overall left ventricular systolic function appears severely reduced.   Ejection fraction is visually estimated to be 30-35%.   There is moderate hypokinesis of the basal-mid septal, basal-mid lateral,   basal-mid inferior and basal-mid inferolateral walls.   Grade I diastolic dysfunction with normal LV filling pressures.   Mild mitral regurgitation.   A bubble study was performed and fails to show evidence of shunting.   Compared to previous echo, LVEF now reduced     Carotid US: 3/2/21:   Summary        The right internal carotid artery demonstrates a patent stent, appears to   [facility] a upper limits 50-69% diameter reducing stenosis based on velocity    criteria.    The left internal carotid artery appears to have a <50% diameter reducing    stenosis based on velocity criteria.    Vertebral flow are antegrade bilaterally.       myoview stress: 4/8/21:  Summary    -There is a moderate sized lateral completely reversible defect c/w    ischemia.    -LV function is mildly depressed with EF=45% and mild inferior lateral    hypokinesis.    -Chest pain this am and under camera.        Recommendation    Pt sent to ER for evaluation and probable cardiac cath in am       Cardiac cath: 4/9/21:  Findings:  Artery Findings/Result   LM Normal   % ostial   Cx 100% mid   % ostial   RCA Nondominant, Smaller RV marginal with mid 70% and distal 90% lesions supplying collateral to distal Cx (old finding), small but could potentially be stented if symptoms merit   S-OM Patent, 99% ostial with ABEL 1 flow   L-LAD patent   LVEDP 6   LVG 35%, inferior hk      Intervention:         PCI of SVG-OM1 with 3.5X23 Xience MARIIA (PD with 3.75NC throughout)     Post Cath Dx:       Severe disease as above, culprit SVG    Assessment:      Diagnosis Orders   1. Coronary artery disease involving native coronary artery of native heart without angina pectoris   ~improved : denies recurrence of angina  ~s/p PTCA SVG > OM-1  ~small RV marginal branch (mid 70%, distal 90%) could be stented if symptoms persist > see cath note  ~DAPT / nitrate / BB / statin / Ranexa     2. HTN (hypertension), benign   ~low normal in office day; had been ~ 130/ before medicines at home. Lasix on hold after episode of dehydration     3. Mixed hyperlipidemia  ~LDL not at goal ; would like to see < 70  ~crestor 40 mg daily    4. Ischemic cardiomyopathy   ~inf HK, EF 35% on cath from 4/9/21  ~moderate hypokinesis of the basal-mid septal, basal-mid lateral,   basal-mid inferior and basal-mid inferolateral walls; EF 30-35% by echo 3/2/21  ~HK inferior and lateral walls with echo from May '20, EF 40%        Patient  is stable since hospital discharge. Plan:  Decrease isosorbide to 30 mg daily : low BP with recent episode of dehydration and light headedness   Start zetia 10 mg daily ; will likely need a PCSK-9 to reach LDL goal   F/U in 8 weeks as scheduled    ~plan for an echo 3 months post-revascularization to reassess his LVF; if remains < 35% consider referring to EP for poss device benefit     I have addressed the patient's cardiac risk factors and adjusted pharmacologic treatment as needed. In addition, I have reinforced the need for patient directed risk factor modification. Further evaluation will be based upon the patient's clinical course and testing results. All questions and concerns were addressed to the patient. Alternatives to  treatment were discussed. The patient  currently  is not smoking. The risks related to smoking were reviewed with the patient. Recommend maintaining a smoke-free lifestyle.      Dual Antiplatelet

## 2021-05-10 LAB
FUNGUS (MYCOLOGY) CULTURE: NORMAL
FUNGUS STAIN: NORMAL

## 2021-05-19 RX ORDER — NITROGLYCERIN 0.4 MG/1
TABLET SUBLINGUAL
Qty: 25 TABLET | Refills: 2 | Status: SHIPPED | OUTPATIENT
Start: 2021-05-19 | End: 2021-12-27

## 2021-05-19 RX ORDER — EPINEPHRINE 0.3 MG/.3ML
INJECTION SUBCUTANEOUS
Refills: 0 | OUTPATIENT
Start: 2021-05-19

## 2021-09-07 DIAGNOSIS — E10.22 TYPE 1 DIABETES MELLITUS WITH STAGE 4 CHRONIC KIDNEY DISEASE (HCC): Chronic | ICD-10-CM

## 2021-09-07 DIAGNOSIS — N18.4 TYPE 1 DIABETES MELLITUS WITH STAGE 4 CHRONIC KIDNEY DISEASE (HCC): Chronic | ICD-10-CM

## 2021-09-07 RX ORDER — INSULIN GLARGINE 100 [IU]/ML
INJECTION, SOLUTION SUBCUTANEOUS
Qty: 5 PEN | Refills: 3 | Status: ON HOLD | OUTPATIENT
Start: 2021-09-07 | End: 2022-05-20

## 2021-09-20 RX ORDER — CLOPIDOGREL BISULFATE 75 MG/1
75 TABLET ORAL DAILY
Qty: 90 TABLET | Refills: 0 | Status: SHIPPED | OUTPATIENT
Start: 2021-09-20 | End: 2021-12-16

## 2021-09-20 NOTE — TELEPHONE ENCOUNTER
Medication Refill    Medication needing refilled:   plavix    Dosage of the medication:    How are you taking this medication (QD, BID, TID, QID, PRN):    30 or 90 day supply called in:    When will you run out of your medication: OUT FOR 4 DAYS ALREADY    Which Pharmacy are we sending the medication to?:  [facility] [address] has been trying to call the hospital doctor that wrote his rx instead of calling [facility]. Patient has been out of this medication for 4 days .

## 2021-09-20 NOTE — TELEPHONE ENCOUNTER
Received refill request for plavix from [facility].     Last ov:4/19/2021 NPTS    Last labs:cbc 4/10/2021    Last Refill:3/4/2021 #30 with 3 refills    Next appointment: no future appt

## 2021-09-20 NOTE — TELEPHONE ENCOUNTER
Refill for 90+0 given. Spoke to patient and scheduled appt with Nella Rock NP on 10/8/21 as he was a no show to his appt in June 2021. Patient voiced understanding of need to appointment/reassessment in office.

## 2021-10-21 ENCOUNTER — TELEPHONE (OUTPATIENT)
Dept: CARDIOLOGY CLINIC | Age: 56
End: 2021-10-21

## 2021-10-21 NOTE — TELEPHONE ENCOUNTER
SHAYY Rojovirgie Fox He has an appt tomorrow with NPTS. He is on a kidney transplant list but needs to get dental work/infections taken care of before they will do the transplant. One of his teeth cracked and has to be removed and they scheduled him for next Thursday 10/28/21. The dentist wants him off his blood thinner for 3 days prior to extraction but he was told he was NOT supposed to do that by [facility]. He wants to talk to NPTS about this tomorrow at his appt .

## 2021-10-22 ENCOUNTER — OFFICE VISIT (OUTPATIENT)
Dept: CARDIOLOGY CLINIC | Age: 56
End: 2021-10-22
Payer: MEDICARE

## 2021-10-22 VITALS
BODY MASS INDEX: 30.92 KG/M2 | HEART RATE: 89 BPM | SYSTOLIC BLOOD PRESSURE: 120 MMHG | DIASTOLIC BLOOD PRESSURE: 62 MMHG | HEIGHT: 68 IN | OXYGEN SATURATION: 99 % | WEIGHT: 204 LBS

## 2021-10-22 DIAGNOSIS — I25.10 CORONARY ARTERY DISEASE INVOLVING NATIVE CORONARY ARTERY OF NATIVE HEART WITHOUT ANGINA PECTORIS: ICD-10-CM

## 2021-10-22 DIAGNOSIS — I10 HTN (HYPERTENSION), BENIGN: ICD-10-CM

## 2021-10-22 DIAGNOSIS — E78.2 MIXED HYPERLIPIDEMIA: ICD-10-CM

## 2021-10-22 DIAGNOSIS — I25.5 ISCHEMIC CARDIOMYOPATHY: Primary | ICD-10-CM

## 2021-10-22 PROCEDURE — 99214 OFFICE O/P EST MOD 30 MIN: CPT | Performed by: NURSE PRACTITIONER

## 2021-10-22 PROCEDURE — 1036F TOBACCO NON-USER: CPT | Performed by: NURSE PRACTITIONER

## 2021-10-22 PROCEDURE — G8417 CALC BMI ABV UP PARAM F/U: HCPCS | Performed by: NURSE PRACTITIONER

## 2021-10-22 PROCEDURE — 3017F COLORECTAL CA SCREEN DOC REV: CPT | Performed by: NURSE PRACTITIONER

## 2021-10-22 PROCEDURE — G8484 FLU IMMUNIZE NO ADMIN: HCPCS | Performed by: NURSE PRACTITIONER

## 2021-10-22 PROCEDURE — G8427 DOCREV CUR MEDS BY ELIG CLIN: HCPCS | Performed by: NURSE PRACTITIONER

## 2021-10-22 RX ORDER — ISOSORBIDE MONONITRATE 30 MG/1
30 TABLET, EXTENDED RELEASE ORAL DAILY
COMMUNITY

## 2021-10-22 NOTE — PROGRESS NOTES
[facility]     Outpatient Follow Up Note    Lamar Padilla is 49 y.o. male who presents today with a history of CAD s/p CABG '01, s/p PTCA SVG > OM-1, HTN, CM/EF 35% and hyperlipidemia. His other hx includes: ESRD, CVA, carotid stenosis s/p Rt CEA '16 (Dr. Dexter Soliz)    Interval hx: per NE's note: referred to [address] transplant for kidney-pancreas transplant (had been on hold with LVEF < 35%)    CHIEF COMPLAINT / HPI:  Follow Up secondary to CAD / CM    Subjective:   He's been well. He started getting a little twinge in his chest about a month ago. Its minor / subtle. He felt some walking in to the office today. When he starts to notice minor discomfort, he stops and it goes away in seconds. He took his dogs out this morning and felt none. Its not similar to his angina (which was a twisting, knife sensation in his chest))    He denies significant chest pain. There is no SOB/MOISE. The patient denies orthopnea/PND. The patient does not have swelling. The patients weight is up a few pounds, 193# at 195.7# (after completing peritoneal). The patient is not experiencing palpitations or dizziness. His home BP on average runs ~ 140/75-85    These symptoms show no change since the last OV. With regard to medication therapy the patient has been compliant with prescribed regimen. They have tolerated therapy to date.      Past Medical History:   Diagnosis Date    Angina at rest [facility])     Cardiomyopathy ([facility]     Carotid artery stenosis 10/25/2016    SUZANNA stented with 8 x 30 mm non-tapered Xact stent    CHF (congestive heart failure) ([facility] 3/3/15    EF 30%     CKD (chronic kidney disease) stage 2, GFR 60-89 ml/min     Coronary artery disease     sp CABG UC    Diabetic peripheral neuropathy (HCC)     Diastolic HF (heart failure) (HCC)     Hyperlipidemia with target LDL less than 70     Hypertension goal BP (blood pressure) < 130/80     PVD (peripheral vascular disease) (HCC)     Seizures (HCC)     Type 1 diabetes mellitus with chronic kidney disease ([facility]     c-peptide <0.1 in 2015     Social History:    Social History     Tobacco Use   Smoking Status Never Smoker   Smokeless Tobacco Never Used     Current Medications:  Current Outpatient Medications   Medication Sig Dispense Refill    isosorbide mononitrate (IMDUR) 30 MG extended release tablet Take 30 mg by mouth daily      ezetimibe (ZETIA) 10 MG tablet TAKE ONE TABLET BY MOUTH DAILY 30 tablet 3    clopidogrel (PLAVIX) 75 MG tablet Take 1 tablet by mouth daily 90 tablet 0    NOVOLOG 100 UNIT/ML injection vial INJECT 40 TO 50 UNITS UNDER THE SKIN DAILY VIA PUMP 2 vial 2    nitroGLYCERIN (NITROSTAT) 0.4 MG SL tablet DISSOLVE 1 TAB UNDER TONGUE FOR CHEST PAIN - IF PAIN REMAINS AFTER 5 MIN, CALL 911 AND REPEAT DOSE.  MAX 3 TABS IN 15 MINUTES 25 tablet 2    ranolazine (RANEXA) 500 MG extended release tablet TAKE ONE TABLET BY MOUTH TWICE A  tablet 1    hydrALAZINE (APRESOLINE) 50 MG tablet Take 1 tablet by mouth every 8 hours 90 tablet 3    carvedilol (COREG) 25 MG tablet Take 2 tablets by mouth daily (with breakfast) (Patient taking differently: Take 25 mg by mouth 2 times daily ) 60 tablet 3    aspirin 81 MG EC tablet Take 1 tablet by mouth daily 30 tablet 3    rosuvastatin (CRESTOR) 40 MG tablet Take 1 tablet by mouth nightly 30 tablet 3    Cholecalciferol (VITAMIN D3 ULTRA POTENCY) 1.25 MG (16846 UT) TABS Take 50,000 Units by mouth once a week (on Saturdays)      Methylcobalamin (B-12) 1000 MCG TBDP Place 1,000 mcg under the tongue daily      BASAGLAR KWIKPEN 100 UNIT/ML injection pen INJECT 20 UNITS UNDER THE SKIN ONCE NIGHTLY WHEN OFF PUMP (Patient not taking: Reported on 10/22/2021) 5 pen 3    Continuous Blood Gluc Sensor (DEXCOM G6 SENSOR) MISC 1 Device by Does not apply route daily 1 each 0    blood glucose test strips (ACCU-CHEK GUIDE) strip TEST BLOOD SUGAR 6 TIMES A  strip 5     No current facility-administered medications for this visit.     REVIEW OF SYSTEMS:    CONSTITUTIONAL: No major weight gain or loss, fatigue, weakness, night sweats or fever. HEENT: No new vision difficulties or ringing in the ears. RESPIRATORY: No new SOB, PND, orthopnea or cough. CARDIOVASCULAR: See HPI  GI: No nausea, vomiting, diarrhea, constipation, abdominal pain or changes in bowel habits. : No urinary frequency, urgency, incontinence hematuria or dysuria. SKIN: No cyanosis or skin lesions. MUSCULOSKELETAL: No new muscle or joint pain. NEUROLOGICAL: No syncope or TIA-like symptoms. PSYCHIATRIC: No anxiety, pain, insomnia or depression  ~will be proposing to [employer] in the near future    Objective:   PHYSICAL EXAM:        Vitals:    10/22/21 1105 10/22/21 1139   BP: 110/60 120/62   Site: Right Upper Arm Right Upper Arm   Position: Sitting    Cuff Size: Medium Adult Large Adult   Pulse: 89    SpO2: 99%    Weight: 204 lb (92.5 kg)    Height: 5' 7.5\" (1.715 m)        VITALS:  /60 (Site: Right Upper Arm, Position: Sitting, Cuff Size: Medium Adult)   Pulse 89   Ht 5' 7.5\" (1.715 m)   Wt 204 lb (92.5 kg)   SpO2 99%   BMI 31.48 kg/m²   CONSTITUTIONAL: Cooperative, no apparent distress, and appears well nourished / developed  NEUROLOGIC:  Awake and orientated to person, place and time. PSYCH: Calm affect. SKIN: Warm and dry; large area of ecchymosis Rt anterior bicep ; small hematoma to palpation . HEENT: Sclera non-icteric, normocephalic, neck supple, no elevation of JVP, normal carotid pulses with no bruits and thyroid normal size. LUNGS:  No increased work of breathing and clear to auscultation, no crackles or wheezing  CARDIOVASCULAR:  Regular rate with ectopy 80 and rhythm with no murmurs, gallops, rubs, or abnormal heart sounds, normal PMI. The apical impulses not displaced  JVP less than 8 cm H2O  Heart tones are crisp and normal  Cervical veins are not engorged  The carotid upstroke is normal in amplitude and contour without delay or bruit  JVP is not elevated  ABDOMEN:  Normal bowel sounds, non-distended and non-tender to palpation  EXT: No edema, no calf tenderness. Pulses are present bilaterally. DATA:    Lab Results   Component Value Date    ALT 13 04/08/2021    AST 12 (L) 04/08/2021    ALKPHOS 111 04/08/2021    BILITOT <0.2 04/08/2021     Lab Results   Component Value Date    CREATININE 8.1 (HH) 04/10/2021    BUN 56 (H) 04/10/2021     (L) 04/10/2021    K 4.4 04/10/2021    CL 97 (L) 04/10/2021    CO2 24 04/10/2021     Lab Results   Component Value Date    TSH 1.60 05/01/2020     Lab Results   Component Value Date    WBC 7.3 04/10/2021    HGB 13.9 04/10/2021    HCT 43.0 04/10/2021    MCV 89.6 04/10/2021     04/10/2021     No components found for: CHLPL  Lab Results   Component Value Date    TRIG 85 03/02/2021    TRIG 69 05/16/2020    TRIG 75 07/25/2019     Lab Results   Component Value Date    HDL 40 03/02/2021    HDL 47 05/16/2020    HDL 45 07/25/2019     Lab Results   Component Value Date    LDLCALC 153 (H) 03/02/2021    LDLCALC 54 05/16/2020    LDLCALC 125 (H) 07/25/2019     Lab Results   Component Value Date    LABVLDL 17 03/02/2021    LABVLDL 14 05/16/2020    LABVLDL 15 07/25/2019     Radiology Review:  Pertinent images / reports were reviewed as a part of this visit and reveals the following:    Procedure:  Oct '16:  Placement of carotid stent in the right internal carotid artery, 8 mm in  dimension.        Last Echo: 3/2/21:  Summary   Definity contrast agent was used to help visualize endocardial borders.   Left ventricular cavity size is moderately dilated.   Overall left ventricular systolic function appears severely reduced.   Ejection fraction is visually estimated to be 30-35%.   There is moderate hypokinesis of the basal-mid septal, basal-mid lateral,   basal-mid inferior and basal-mid inferolateral walls.   Grade I diastolic dysfunction with normal LV filling pressures.   Mild mitral regurgitation.   A bubble study was performed and fails to show evidence of shunting.   Compared to previous echo, LVEF now reduced     Carotid US: 3/2/21:   Summary        The right internal carotid artery demonstrates a patent stent, appears to   [facility] a upper limits 50-69% diameter reducing stenosis based on velocity    criteria.    The left internal carotid artery appears to have a <50% diameter reducing    stenosis based on velocity criteria.    Vertebral flow are antegrade bilaterally.       myoview stress: 4/8/21:  Summary    -There is a moderate sized lateral completely reversible defect c/w    ischemia.    -LV function is mildly depressed with EF=45% and mild inferior lateral    hypokinesis.    -Chest pain this am and under camera.        Recommendation    Pt sent to ER for evaluation and probable cardiac cath in am         Cardiac cath: 4/9/21:  Findings:  Artery Findings/Result   LM Normal   % ostial   Cx 100% mid   % ostial   RCA Nondominant, Smaller RV marginal with mid 70% and distal 90% lesions supplying collateral to distal Cx (old finding), small but could potentially be stented if symptoms merit   S-OM Patent, 99% ostial with ABEL 1 flow   L-LAD patent   LVEDP 6   LVG 35%, inferior hk      Intervention:         PCI of SVG-OM1 with 3.5X23 Xience MARIIA (PD with 3.75NC throughout)     Post Cath Dx:       Severe disease as above, culprit SVG       Assessment:      Diagnosis Orders   1. Ischemic cardiomyopathy   ~inf HK, EF 35% on cath from 4/9/21  ~moderate hypokinesis of the basal-mid septal, basal-mid lateral,   basal-mid inferior and basal-mid inferolateral walls; EF 30-35% by echo 3/2/21  ~HK inferior and lateral walls with echo from May '20, EF 40%  ~no signs of decompensation   ~lasix / hydralazine-isosorbide / carvedilol  Echo 2D w doppler w color complete   2.  Coronary artery disease involving native coronary artery of native heart without angina pectoris   ~stable   ~atypical chest discomfort from angina of transient duration. Taking imdur 30 mg daily  ~s/p CABG  ~s/p PTCA SVG > OM-1 April '21 ( small RV marginal branch (mid 70%, distal 90%) could be stented if symptoms persist > see cath note)  ~DAPT / nitrate / Reggy Cram / BB / statin    3. Mixed hyperlipidemia   ~controlled on zetia and crestor    4. HTN (hypertension), benign   ~controlled         I had the opportunity to review the clinical symptoms and presentation of Te Garay.   Plan:     1. Echocardiogram : reassess LVEF 3 months post-revascularization (needs before listing for transplant)  2. Discussed increasing isosorbide to 60 mg daily ; he prefers to monitor symptoms  3. F/U in three months    F/U with vascular for carotid disease   I will d/w [address] interruption in DAPT to proceed with dental work (needs in order to list)    Overall the patient is stable from CV standpoint    I have addresed the patient's cardiac risk factors and adjusted pharmacologic treatment as needed. In addition, I have reinforced the need for patient directed risk factor modification. Further evaluation will be based upon the patient's clinical course and testing results. All questions and concerns were addressed to the patient Alternatives to my treatment were discussed. The patient is not currently smoking. The risks related to smoking were reviewed with the patient. Recommend maintaining a smoke-free lifestyle. Products available for smoking cessation were discussed in detail. Patient is on a beta-blocker  Patient is on an ace-i/ARB  Patient is on a statin    Dual Antiplatelet therapy / anti-coagulation has been recommended / prescribed for this patient. Education conducted on adverse reactions including bleeding was discussed. Angiotension inhibitor/angiotension receptor blocker has __ been prescribed / recommended for congestive heart failure. Daily weight, low sodium diet were discussed.  Patient instructed to call the office with a weight gain: > 3 # over night or 5# in one week; swelling, SOB/orthopnea/PND    The patient verbalizes understanding not to stop medications without discussing with us. Discussed exercise: 30-60 minutes 7 days/week  Discussed Low saturated fat/RUBIA diet.      Thank you for allowing to us to participate in the care of JAYLEN Novoa    Documentation of today's visit sent to PCP

## 2021-10-30 ENCOUNTER — TELEPHONE (OUTPATIENT)
Dept: CARDIOLOGY | Age: 56
End: 2021-10-30

## 2021-10-30 NOTE — TELEPHONE ENCOUNTER
----- Message from Theodore Rivera MD sent at 10/29/2021  4:42 PM EDT -----  Tess Alatorre,    It can be interrupted. Allyssa Will  ----- Message -----  From: Sherryl Closs, APRN - CNP  Sent: 10/22/2021   1:24 PM EDT  To: Theodore Rivera MD    VANE: pt needs dental work (cracked teeth) getting ready to be listed. He needs to interrupt DAPT but his stent was in April '21. What's your recommendation for him?   Thanks, NPT

## 2021-12-03 PROBLEM — U07.1 INFECTION DUE TO 2019-NCOV: Status: ACTIVE | Noted: 2021-12-03

## 2021-12-03 RX ORDER — HEPARIN SODIUM (PORCINE) LOCK FLUSH IV SOLN 100 UNIT/ML 100 UNIT/ML
500 SOLUTION INTRAVENOUS PRN
Status: CANCELLED | OUTPATIENT
Start: 2021-12-06

## 2021-12-03 RX ORDER — ONDANSETRON 2 MG/ML
8 INJECTION INTRAMUSCULAR; INTRAVENOUS
Status: CANCELLED | OUTPATIENT
Start: 2021-12-06

## 2021-12-03 RX ORDER — ACETAMINOPHEN 325 MG/1
650 TABLET ORAL
Status: CANCELLED | OUTPATIENT
Start: 2021-12-06

## 2021-12-03 RX ORDER — EPINEPHRINE 1 MG/ML
0.3 INJECTION, SOLUTION, CONCENTRATE INTRAVENOUS PRN
Status: CANCELLED | OUTPATIENT
Start: 2021-12-06

## 2021-12-03 RX ORDER — ALBUTEROL SULFATE 90 UG/1
4 AEROSOL, METERED RESPIRATORY (INHALATION) PRN
Status: CANCELLED | OUTPATIENT
Start: 2021-12-06

## 2021-12-03 RX ORDER — DIPHENHYDRAMINE HYDROCHLORIDE 50 MG/ML
50 INJECTION INTRAMUSCULAR; INTRAVENOUS
Status: CANCELLED | OUTPATIENT
Start: 2021-12-06

## 2021-12-03 RX ORDER — SODIUM CHLORIDE 9 MG/ML
INJECTION, SOLUTION INTRAVENOUS CONTINUOUS
Status: CANCELLED | OUTPATIENT
Start: 2021-12-06

## 2021-12-03 RX ORDER — SODIUM CHLORIDE 9 MG/ML
25 INJECTION, SOLUTION INTRAVENOUS PRN
Status: CANCELLED | OUTPATIENT
Start: 2021-12-06

## 2021-12-06 ENCOUNTER — HOSPITAL ENCOUNTER (INPATIENT)
Age: 56
LOS: 1 days | Discharge: HOME | DRG: 871 | End: 2021-12-08
Attending: EMERGENCY MEDICINE | Admitting: INTERNAL MEDICINE
Payer: COMMERCIAL

## 2021-12-06 ENCOUNTER — HOSPITAL ENCOUNTER (OUTPATIENT)
Dept: INFUSION THERAPY | Age: 56
Setting detail: INFUSION SERIES
Discharge: HOME | End: 2021-12-06
Payer: COMMERCIAL

## 2021-12-06 VITALS
HEART RATE: 78 BPM | DIASTOLIC BLOOD PRESSURE: 69 MMHG | TEMPERATURE: 100.1 F | RESPIRATION RATE: 18 BRPM | SYSTOLIC BLOOD PRESSURE: 134 MMHG | OXYGEN SATURATION: 96 %

## 2021-12-06 DIAGNOSIS — U07.1 COVID-19 VIRUS INFECTION: ICD-10-CM

## 2021-12-06 DIAGNOSIS — Z99.2 END-STAGE RENAL DISEASE ON PERITONEAL DIALYSIS (HCC): ICD-10-CM

## 2021-12-06 DIAGNOSIS — R50.9 FEVER, UNSPECIFIED FEVER CAUSE: Primary | ICD-10-CM

## 2021-12-06 DIAGNOSIS — T85.71XA PERITONITIS ASSOCIATED WITH PERITONEAL DIALYSIS, INITIAL ENCOUNTER (HCC): ICD-10-CM

## 2021-12-06 DIAGNOSIS — N18.6 END-STAGE RENAL DISEASE ON PERITONEAL DIALYSIS (HCC): ICD-10-CM

## 2021-12-06 DIAGNOSIS — U07.1 INFECTION DUE TO 2019-NCOV: Primary | ICD-10-CM

## 2021-12-06 PROCEDURE — M0243 CASIRIVI AND IMDEVI INFUSION: HCPCS

## 2021-12-06 PROCEDURE — 2580000003 HC RX 258: Performed by: INTERNAL MEDICINE

## 2021-12-06 PROCEDURE — 96365 THER/PROPH/DIAG IV INF INIT: CPT

## 2021-12-06 PROCEDURE — 99283 EMERGENCY DEPT VISIT LOW MDM: CPT

## 2021-12-06 PROCEDURE — 96375 TX/PRO/DX INJ NEW DRUG ADDON: CPT

## 2021-12-06 PROCEDURE — 6360000002 HC RX W HCPCS: Performed by: INTERNAL MEDICINE

## 2021-12-06 PROCEDURE — 99284 EMERGENCY DEPT VISIT MOD MDM: CPT

## 2021-12-06 PROCEDURE — 96367 TX/PROPH/DG ADDL SEQ IV INF: CPT

## 2021-12-06 RX ORDER — SODIUM CHLORIDE 0.9 % (FLUSH) 0.9 %
5-40 SYRINGE (ML) INJECTION PRN
Status: CANCELLED | OUTPATIENT
Start: 2021-12-06

## 2021-12-06 RX ORDER — EPINEPHRINE 1 MG/ML
0.3 INJECTION, SOLUTION, CONCENTRATE INTRAVENOUS PRN
Status: CANCELLED | OUTPATIENT
Start: 2021-12-06

## 2021-12-06 RX ORDER — ACETAMINOPHEN 325 MG/1
650 TABLET ORAL
Status: CANCELLED | OUTPATIENT
Start: 2021-12-06

## 2021-12-06 RX ORDER — DIPHENHYDRAMINE HYDROCHLORIDE 50 MG/ML
50 INJECTION INTRAMUSCULAR; INTRAVENOUS
Status: CANCELLED | OUTPATIENT
Start: 2021-12-06

## 2021-12-06 RX ORDER — ONDANSETRON 2 MG/ML
8 INJECTION INTRAMUSCULAR; INTRAVENOUS
Status: CANCELLED | OUTPATIENT
Start: 2021-12-06

## 2021-12-06 RX ORDER — ALBUTEROL SULFATE 90 UG/1
4 AEROSOL, METERED RESPIRATORY (INHALATION) PRN
Status: CANCELLED | OUTPATIENT
Start: 2021-12-06

## 2021-12-06 RX ORDER — SODIUM CHLORIDE 9 MG/ML
25 INJECTION, SOLUTION INTRAVENOUS PRN
Status: CANCELLED | OUTPATIENT
Start: 2021-12-06

## 2021-12-06 RX ORDER — HEPARIN SODIUM (PORCINE) LOCK FLUSH IV SOLN 100 UNIT/ML 100 UNIT/ML
500 SOLUTION INTRAVENOUS PRN
Status: CANCELLED | OUTPATIENT
Start: 2021-12-06

## 2021-12-06 RX ORDER — SODIUM CHLORIDE 0.9 % (FLUSH) 0.9 %
5-40 SYRINGE (ML) INJECTION PRN
Status: DISCONTINUED | OUTPATIENT
Start: 2021-12-06 | End: 2021-12-07 | Stop reason: HOSPADM

## 2021-12-06 RX ORDER — SODIUM CHLORIDE 9 MG/ML
INJECTION, SOLUTION INTRAVENOUS CONTINUOUS
Status: CANCELLED | OUTPATIENT
Start: 2021-12-06

## 2021-12-06 RX ADMIN — SODIUM CHLORIDE, PRESERVATIVE FREE 30 ML: 5 INJECTION INTRAVENOUS at 12:10

## 2021-12-06 RX ADMIN — CASIRIVIMAB AND IMDEVIMAB: 600; 600 INJECTION, SOLUTION, CONCENTRATE INTRAVENOUS at 11:40

## 2021-12-06 ASSESSMENT — PAIN DESCRIPTION - LOCATION: LOCATION: ABDOMEN

## 2021-12-06 ASSESSMENT — PAIN DESCRIPTION - PAIN TYPE: TYPE: ACUTE PAIN

## 2021-12-06 ASSESSMENT — PAIN SCALES - GENERAL: PAINLEVEL_OUTOF10: 4

## 2021-12-06 NOTE — PROGRESS NOTES
[facility]     Regen-COV Visit    NAME:  Liban Garcia OF BIRTH:  1965  MEDICAL RECORD NUMBER:  [id number]  Episode Date:  12/6/2021    Patient arrived to [facility]   [] per wheelchair   [x] ambulatory     Indication for use:      [x] Treatment of Covid 19    [] Post Exposure Prophylaxis            - patient at high risk for progression to severe COVID-19             - patient with immunocompromising condition or is taking immunosuppressive medications            - high risk of exposure due to occurrence of COVID in an institutional setting such as Nursing home, Group home or [facility]. Date of COVID test: Patient did a home test    Have you received COVID vaccine: Yes    When did symptoms first appear: About a week ago    What symptoms are you having:     [x] Fever, in the beginning for 3 days     [x] Cough, productive cough     [x] SOB     [] Headache     [x] Tiredness     [x] Muscle or body aches, mostly back pain     [] Loss of taste or smell     [x] Sore throat     [x] Congestion or runny nose, mostly head congestion     [] Nausea or vomiting     [] Diarrhea , had this for 3 days but not now     []     Patient has history of endstage renal disease, DM, CAD with stenting, carotid artery stenosis, EF 35%,  PVD. He is presently on peritoneal dialysis. He is very weak, tired and SOB just walking to ER room from [facility]. O2 sats 96% on room air.    Temp 101.1 and patient states he took some theraflu this morning with acetominophen about 10:15 AM.     /73   Pulse 83   Temp 99.3 °F (37.4 °C) (Oral)   Resp 18   SpO2 96%     Breath Sounds: No increased work of breathing, Breath sounds clear to auscultation bilaterally, Good air exchange and No crackles  Pulse Oximetry: 96 %    Reviewed information on Regen-COV medication ( casirivimab and imdevimab) such as Emergency Use Authorization status, hypersensitivity drug and potential side effects such as fever, chills, headache, nausea, SOB, high or low BP, rapid or slow heart rate, chest discomfort or pain, weakness, confusion, wheezing, swelling of face, lips or throat, rash, hives, itching, feeling faint, dizziness, sweating. Patient given Fact sheet. Administered via: [x] Peripheral access    [] PICC access    [] Port access    Patient received Regen-COV 1200 mg (casirivimab 600 mg and imdevimab 600 mg) in 100 ml NS IVPB over 30 minutes. Patient monitored for an hour after infusion    Vital signs done per protocol and are documented on flowsheet:    Patient monitored for 1 hour post infusion. VSS and no complaints. Patient has been given a copy of Regen COV Fact Sheet: Yes      Response to treatment:  Well tolerated by patient.       Electronically signed by Delmi Romo RN on 12/6/2021 at 7:15 PM.

## 2021-12-07 ENCOUNTER — APPOINTMENT (OUTPATIENT)
Dept: CT IMAGING | Age: 56
DRG: 871 | End: 2021-12-07
Payer: COMMERCIAL

## 2021-12-07 PROBLEM — T85.71XA PERITONITIS ASSOCIATED WITH PERITONEAL DIALYSIS (HCC): Status: ACTIVE | Noted: 2021-12-07

## 2021-12-07 PROBLEM — A41.9 SEPSIS (HCC): Status: ACTIVE | Noted: 2021-12-07

## 2021-12-07 PROBLEM — T85.71XA DIALYSIS-ASSOCIATED PERITONITIS (HCC): Status: ACTIVE | Noted: 2021-12-07

## 2021-12-07 PROBLEM — R50.81 FEVER DUE TO COVID-19: Status: ACTIVE | Noted: 2021-12-03

## 2021-12-07 LAB
A/G RATIO: 0.9 (ref 1.1–2.2)
ALBUMIN SERPL-MCNC: 3 G/DL (ref 3.4–5)
ALP BLD-CCNC: 92 U/L (ref 40–129)
ALT SERPL-CCNC: 18 U/L (ref 10–40)
AMORPHOUS: NORMAL /HPF
ANION GAP SERPL CALCULATED.3IONS-SCNC: 14 MMOL/L (ref 3–16)
APPEARANCE FLUID: NORMAL
AST SERPL-CCNC: 30 U/L (ref 15–37)
BASOPHILS ABSOLUTE: 0 K/UL (ref 0–0.2)
BASOPHILS RELATIVE PERCENT: 0.4 %
BILIRUB SERPL-MCNC: <0.2 MG/DL (ref 0–1)
BILIRUBIN URINE: NEGATIVE
BLOOD, URINE: ABNORMAL
BUN BLDV-MCNC: 35 MG/DL (ref 7–20)
C-REACTIVE PROTEIN: 83.9 MG/L (ref 0–5.1)
CALCIUM SERPL-MCNC: 7.7 MG/DL (ref 8.3–10.6)
CELL COUNT FLUID TYPE: NORMAL
CHLORIDE BLD-SCNC: 101 MMOL/L (ref 99–110)
CLARITY: ABNORMAL
CLOT EVALUATION: NORMAL
CO2: 20 MMOL/L (ref 21–32)
COLOR FLUID: NORMAL
COLOR: YELLOW
COMMENT UA: NORMAL
CREAT SERPL-MCNC: 5.6 MG/DL (ref 0.9–1.3)
D DIMER: 328 NG/ML DDU (ref 0–229)
EOSINOPHILS ABSOLUTE: 0 K/UL (ref 0–0.6)
EOSINOPHILS RELATIVE PERCENT: 0.1 %
EPITHELIAL CELLS, UA: 1 /HPF (ref 0–5)
GFR AFRICAN AMERICAN: 13
GFR NON-AFRICAN AMERICAN: 11
GLUCOSE BLD-MCNC: 202 MG/DL (ref 70–99)
GLUCOSE BLD-MCNC: 302 MG/DL (ref 70–99)
GLUCOSE URINE: 100 MG/DL
HCT VFR BLD CALC: 44.2 % (ref 40.5–52.5)
HEMOGLOBIN: 14.4 G/DL (ref 13.5–17.5)
HYALINE CASTS: 1 /LPF (ref 0–8)
KETONES, URINE: ABNORMAL MG/DL
LACTATE DEHYDROGENASE: 360 U/L (ref 100–190)
LACTIC ACID, SEPSIS: 1.1 MMOL/L (ref 0.4–1.9)
LEUKOCYTE ESTERASE, URINE: NEGATIVE
LIPASE: 22 U/L (ref 13–60)
LYMPHOCYTES ABSOLUTE: 0.3 K/UL (ref 1–5.1)
LYMPHOCYTES RELATIVE PERCENT: 6 %
LYMPHOCYTES, BODY FLUID: 14 %
MACROPHAGE FLUID: 67 %
MCH RBC QN AUTO: 29.2 PG (ref 26–34)
MCHC RBC AUTO-ENTMCNC: 32.6 G/DL (ref 31–36)
MCV RBC AUTO: 89.6 FL (ref 80–100)
MICROSCOPIC EXAMINATION: YES
MONOCYTES ABSOLUTE: 0.2 K/UL (ref 0–1.3)
MONOCYTES RELATIVE PERCENT: 4 %
NEUTROPHIL, FLUID: 19 %
NEUTROPHILS ABSOLUTE: 4.6 K/UL (ref 1.7–7.7)
NEUTROPHILS RELATIVE PERCENT: 89.5 %
NITRITE, URINE: NEGATIVE
NUCLEATED CELLS FLUID: 408 /CUMM
NUMBER OF CELLS COUNTED FLUID: 100
PDW BLD-RTO: 14.4 % (ref 12.4–15.4)
PERFORMED ON: ABNORMAL
PH UA: 5.5 (ref 5–8)
PLATELET # BLD: 98 K/UL (ref 135–450)
PLATELET SLIDE REVIEW: ABNORMAL
PMV BLD AUTO: 11 FL (ref 5–10.5)
POTASSIUM SERPL-SCNC: 4.7 MMOL/L (ref 3.5–5.1)
PROCALCITONIN: 0.86 NG/ML (ref 0–0.15)
PROCALCITONIN: 1.67 NG/ML (ref 0–0.15)
PROTEIN UA: >=300 MG/DL
RBC # BLD: 4.93 M/UL (ref 4.2–5.9)
RBC FLUID: 1700 /CUMM
RBC UA: 3 /HPF (ref 0–4)
SLIDE REVIEW: ABNORMAL
SODIUM BLD-SCNC: 135 MMOL/L (ref 136–145)
SPECIFIC GRAVITY UA: 1.02 (ref 1–1.03)
TOTAL PROTEIN: 6.5 G/DL (ref 6.4–8.2)
URINE REFLEX TO CULTURE: ABNORMAL
URINE TYPE: ABNORMAL
UROBILINOGEN, URINE: 0.2 E.U./DL
WBC # BLD: 5.2 K/UL (ref 4–11)
WBC UA: 4 /HPF (ref 0–5)

## 2021-12-07 PROCEDURE — 74176 CT ABD & PELVIS W/O CONTRAST: CPT

## 2021-12-07 PROCEDURE — U0003 INFECTIOUS AGENT DETECTION BY NUCLEIC ACID (DNA OR RNA); SEVERE ACUTE RESPIRATORY SYNDROME CORONAVIRUS 2 (SARS-COV-2) (CORONAVIRUS DISEASE [COVID-19]), AMPLIFIED PROBE TECHNIQUE, MAKING USE OF HIGH THROUGHPUT TECHNOLOGIES AS DESCRIBED BY CMS-2020-01-R: HCPCS

## 2021-12-07 PROCEDURE — G0378 HOSPITAL OBSERVATION PER HR: HCPCS

## 2021-12-07 PROCEDURE — 6370000000 HC RX 637 (ALT 250 FOR IP): Performed by: INTERNAL MEDICINE

## 2021-12-07 PROCEDURE — U0005 INFEC AGEN DETEC AMPLI PROBE: HCPCS

## 2021-12-07 PROCEDURE — 36415 COLL VENOUS BLD VENIPUNCTURE: CPT

## 2021-12-07 PROCEDURE — 82728 ASSAY OF FERRITIN: CPT

## 2021-12-07 PROCEDURE — 85025 COMPLETE CBC W/AUTO DIFF WBC: CPT

## 2021-12-07 PROCEDURE — 83690 ASSAY OF LIPASE: CPT

## 2021-12-07 PROCEDURE — 6370000000 HC RX 637 (ALT 250 FOR IP): Performed by: NURSE PRACTITIONER

## 2021-12-07 PROCEDURE — 89051 BODY FLUID CELL COUNT: CPT

## 2021-12-07 PROCEDURE — 96375 TX/PRO/DX INJ NEW DRUG ADDON: CPT

## 2021-12-07 PROCEDURE — 83605 ASSAY OF LACTIC ACID: CPT

## 2021-12-07 PROCEDURE — 96366 THER/PROPH/DIAG IV INF ADDON: CPT

## 2021-12-07 PROCEDURE — 6360000002 HC RX W HCPCS: Performed by: NURSE PRACTITIONER

## 2021-12-07 PROCEDURE — 2580000003 HC RX 258: Performed by: NURSE PRACTITIONER

## 2021-12-07 PROCEDURE — 2580000003 HC RX 258: Performed by: EMERGENCY MEDICINE

## 2021-12-07 PROCEDURE — 85379 FIBRIN DEGRADATION QUANT: CPT

## 2021-12-07 PROCEDURE — 90945 DIALYSIS ONE EVALUATION: CPT

## 2021-12-07 PROCEDURE — 96367 TX/PROPH/DG ADDL SEQ IV INF: CPT

## 2021-12-07 PROCEDURE — 84145 PROCALCITONIN (PCT): CPT

## 2021-12-07 PROCEDURE — 83615 LACTATE (LD) (LDH) ENZYME: CPT

## 2021-12-07 PROCEDURE — 96372 THER/PROPH/DIAG INJ SC/IM: CPT

## 2021-12-07 PROCEDURE — 6360000002 HC RX W HCPCS: Performed by: INTERNAL MEDICINE

## 2021-12-07 PROCEDURE — 81001 URINALYSIS AUTO W/SCOPE: CPT

## 2021-12-07 PROCEDURE — 87205 SMEAR GRAM STAIN: CPT

## 2021-12-07 PROCEDURE — 2580000003 HC RX 258: Performed by: INTERNAL MEDICINE

## 2021-12-07 PROCEDURE — 5A1D70Z PERFORMANCE OF URINARY FILTRATION, INTERMITTENT, LESS THAN 6 HOURS PER DAY: ICD-10-PCS | Performed by: INTERNAL MEDICINE

## 2021-12-07 PROCEDURE — 99223 1ST HOSP IP/OBS HIGH 75: CPT | Performed by: INTERNAL MEDICINE

## 2021-12-07 PROCEDURE — 1200000000 HC SEMI PRIVATE

## 2021-12-07 PROCEDURE — 87040 BLOOD CULTURE FOR BACTERIA: CPT

## 2021-12-07 PROCEDURE — 83036 HEMOGLOBIN GLYCOSYLATED A1C: CPT

## 2021-12-07 PROCEDURE — 86140 C-REACTIVE PROTEIN: CPT

## 2021-12-07 PROCEDURE — 80053 COMPREHEN METABOLIC PANEL: CPT

## 2021-12-07 PROCEDURE — 6360000002 HC RX W HCPCS: Performed by: EMERGENCY MEDICINE

## 2021-12-07 PROCEDURE — 96365 THER/PROPH/DIAG IV INF INIT: CPT

## 2021-12-07 RX ORDER — HYDROMORPHONE HYDROCHLORIDE 1 MG/ML
0.5 INJECTION, SOLUTION INTRAMUSCULAR; INTRAVENOUS; SUBCUTANEOUS
Status: DISCONTINUED | OUTPATIENT
Start: 2021-12-07 | End: 2021-12-08 | Stop reason: HOSPADM

## 2021-12-07 RX ORDER — HEPARIN SODIUM 5000 [USP'U]/ML
5000 INJECTION, SOLUTION INTRAVENOUS; SUBCUTANEOUS EVERY 8 HOURS SCHEDULED
Status: DISCONTINUED | OUTPATIENT
Start: 2021-12-07 | End: 2021-12-08 | Stop reason: HOSPADM

## 2021-12-07 RX ORDER — ERGOCALCIFEROL 1.25 MG/1
50000 CAPSULE ORAL WEEKLY
Status: DISCONTINUED | OUTPATIENT
Start: 2021-12-07 | End: 2021-12-08 | Stop reason: HOSPADM

## 2021-12-07 RX ORDER — SODIUM CHLORIDE, SODIUM LACTATE, CALCIUM CHLORIDE, MAGNESIUM CHLORIDE AND DEXTROSE 1.5; 538; 448; 18.3; 5.08 G/100ML; MG/100ML; MG/100ML; MG/100ML; MG/100ML
5000 INJECTION, SOLUTION INTRAPERITONEAL DAILY
Status: DISCONTINUED | OUTPATIENT
Start: 2021-12-07 | End: 2021-12-08 | Stop reason: HOSPADM

## 2021-12-07 RX ORDER — NITROGLYCERIN 0.4 MG/1
0.4 TABLET SUBLINGUAL EVERY 5 MIN PRN
Status: DISCONTINUED | OUTPATIENT
Start: 2021-12-07 | End: 2021-12-08 | Stop reason: HOSPADM

## 2021-12-07 RX ORDER — ONDANSETRON 2 MG/ML
4 INJECTION INTRAMUSCULAR; INTRAVENOUS EVERY 30 MIN PRN
Status: DISCONTINUED | OUTPATIENT
Start: 2021-12-07 | End: 2021-12-07

## 2021-12-07 RX ORDER — ONDANSETRON 4 MG/1
4 TABLET, ORALLY DISINTEGRATING ORAL EVERY 8 HOURS PRN
Status: DISCONTINUED | OUTPATIENT
Start: 2021-12-07 | End: 2021-12-08 | Stop reason: HOSPADM

## 2021-12-07 RX ORDER — PROMETHAZINE HYDROCHLORIDE 25 MG/ML
12.5 INJECTION, SOLUTION INTRAMUSCULAR; INTRAVENOUS EVERY 4 HOURS PRN
Status: DISCONTINUED | OUTPATIENT
Start: 2021-12-07 | End: 2021-12-08 | Stop reason: HOSPADM

## 2021-12-07 RX ORDER — CARVEDILOL 25 MG/1
25 TABLET ORAL 2 TIMES DAILY
Status: DISCONTINUED | OUTPATIENT
Start: 2021-12-07 | End: 2021-12-08 | Stop reason: HOSPADM

## 2021-12-07 RX ORDER — ROSUVASTATIN CALCIUM 40 MG/1
40 TABLET, COATED ORAL NIGHTLY
Status: DISCONTINUED | OUTPATIENT
Start: 2021-12-07 | End: 2021-12-08 | Stop reason: HOSPADM

## 2021-12-07 RX ORDER — ISOSORBIDE MONONITRATE 30 MG/1
30 TABLET, EXTENDED RELEASE ORAL DAILY
Status: DISCONTINUED | OUTPATIENT
Start: 2021-12-07 | End: 2021-12-08 | Stop reason: HOSPADM

## 2021-12-07 RX ORDER — CLOPIDOGREL BISULFATE 75 MG/1
75 TABLET ORAL DAILY
Status: DISCONTINUED | OUTPATIENT
Start: 2021-12-07 | End: 2021-12-08 | Stop reason: HOSPADM

## 2021-12-07 RX ORDER — SODIUM CHLORIDE 9 MG/ML
25 INJECTION, SOLUTION INTRAVENOUS PRN
Status: DISCONTINUED | OUTPATIENT
Start: 2021-12-07 | End: 2021-12-08 | Stop reason: HOSPADM

## 2021-12-07 RX ORDER — HYDRALAZINE HYDROCHLORIDE 25 MG/1
50 TABLET, FILM COATED ORAL EVERY 8 HOURS SCHEDULED
Status: DISCONTINUED | OUTPATIENT
Start: 2021-12-07 | End: 2021-12-08 | Stop reason: HOSPADM

## 2021-12-07 RX ORDER — POLYETHYLENE GLYCOL 3350 17 G/17G
17 POWDER, FOR SOLUTION ORAL DAILY PRN
Status: DISCONTINUED | OUTPATIENT
Start: 2021-12-07 | End: 2021-12-08 | Stop reason: HOSPADM

## 2021-12-07 RX ORDER — EZETIMIBE 10 MG/1
1 TABLET ORAL DAILY
Status: DISCONTINUED | OUTPATIENT
Start: 2021-12-07 | End: 2021-12-07 | Stop reason: RX

## 2021-12-07 RX ORDER — ACETAMINOPHEN 650 MG/1
650 SUPPOSITORY RECTAL EVERY 6 HOURS PRN
Status: DISCONTINUED | OUTPATIENT
Start: 2021-12-07 | End: 2021-12-08 | Stop reason: HOSPADM

## 2021-12-07 RX ORDER — ASPIRIN 81 MG/1
81 TABLET ORAL DAILY
Status: DISCONTINUED | OUTPATIENT
Start: 2021-12-07 | End: 2021-12-08 | Stop reason: HOSPADM

## 2021-12-07 RX ORDER — DEXTROSE MONOHYDRATE 50 MG/ML
100 INJECTION, SOLUTION INTRAVENOUS PRN
Status: DISCONTINUED | OUTPATIENT
Start: 2021-12-07 | End: 2021-12-08 | Stop reason: HOSPADM

## 2021-12-07 RX ORDER — NICOTINE POLACRILEX 4 MG
15 LOZENGE BUCCAL PRN
Status: DISCONTINUED | OUTPATIENT
Start: 2021-12-07 | End: 2021-12-08 | Stop reason: HOSPADM

## 2021-12-07 RX ORDER — GUAIFENESIN/DEXTROMETHORPHAN 100-10MG/5
5 SYRUP ORAL EVERY 4 HOURS PRN
Status: DISCONTINUED | OUTPATIENT
Start: 2021-12-07 | End: 2021-12-08 | Stop reason: HOSPADM

## 2021-12-07 RX ORDER — MORPHINE SULFATE 4 MG/ML
4 INJECTION, SOLUTION INTRAMUSCULAR; INTRAVENOUS ONCE
Status: COMPLETED | OUTPATIENT
Start: 2021-12-07 | End: 2021-12-07

## 2021-12-07 RX ORDER — DEXTROSE MONOHYDRATE 25 G/50ML
12.5 INJECTION, SOLUTION INTRAVENOUS PRN
Status: DISCONTINUED | OUTPATIENT
Start: 2021-12-07 | End: 2021-12-08 | Stop reason: HOSPADM

## 2021-12-07 RX ORDER — ACETAMINOPHEN 325 MG/1
650 TABLET ORAL EVERY 6 HOURS PRN
Status: DISCONTINUED | OUTPATIENT
Start: 2021-12-07 | End: 2021-12-08 | Stop reason: HOSPADM

## 2021-12-07 RX ORDER — ACETAMINOPHEN 500 MG
1000 TABLET ORAL ONCE
Status: COMPLETED | OUTPATIENT
Start: 2021-12-07 | End: 2021-12-07

## 2021-12-07 RX ORDER — GENTAMICIN SULFATE 1 MG/G
CREAM TOPICAL DAILY
Status: DISCONTINUED | OUTPATIENT
Start: 2021-12-07 | End: 2021-12-08 | Stop reason: HOSPADM

## 2021-12-07 RX ORDER — INSULIN LISPRO 100 [IU]/ML
0-9 INJECTION, SOLUTION INTRAVENOUS; SUBCUTANEOUS NIGHTLY
Status: DISCONTINUED | OUTPATIENT
Start: 2021-12-07 | End: 2021-12-08 | Stop reason: HOSPADM

## 2021-12-07 RX ORDER — SODIUM CHLORIDE, SODIUM LACTATE, POTASSIUM CHLORIDE, CALCIUM CHLORIDE 600; 310; 30; 20 MG/100ML; MG/100ML; MG/100ML; MG/100ML
1000 INJECTION, SOLUTION INTRAVENOUS ONCE
Status: COMPLETED | OUTPATIENT
Start: 2021-12-07 | End: 2021-12-07

## 2021-12-07 RX ORDER — INSULIN LISPRO 100 [IU]/ML
0-18 INJECTION, SOLUTION INTRAVENOUS; SUBCUTANEOUS
Status: DISCONTINUED | OUTPATIENT
Start: 2021-12-07 | End: 2021-12-08 | Stop reason: HOSPADM

## 2021-12-07 RX ORDER — SODIUM CHLORIDE 0.9 % (FLUSH) 0.9 %
5-40 SYRINGE (ML) INJECTION PRN
Status: DISCONTINUED | OUTPATIENT
Start: 2021-12-07 | End: 2021-12-08 | Stop reason: HOSPADM

## 2021-12-07 RX ORDER — SODIUM CHLORIDE 0.9 % (FLUSH) 0.9 %
5-40 SYRINGE (ML) INJECTION EVERY 12 HOURS SCHEDULED
Status: DISCONTINUED | OUTPATIENT
Start: 2021-12-07 | End: 2021-12-08 | Stop reason: HOSPADM

## 2021-12-07 RX ORDER — SODIUM CHLORIDE 9 MG/ML
INJECTION, SOLUTION INTRAVENOUS CONTINUOUS
Status: DISCONTINUED | OUTPATIENT
Start: 2021-12-07 | End: 2021-12-08 | Stop reason: HOSPADM

## 2021-12-07 RX ORDER — RANOLAZINE 500 MG/1
500 TABLET, EXTENDED RELEASE ORAL 2 TIMES DAILY
Status: DISCONTINUED | OUTPATIENT
Start: 2021-12-07 | End: 2021-12-08 | Stop reason: HOSPADM

## 2021-12-07 RX ORDER — ONDANSETRON 2 MG/ML
4 INJECTION INTRAMUSCULAR; INTRAVENOUS EVERY 6 HOURS PRN
Status: DISCONTINUED | OUTPATIENT
Start: 2021-12-07 | End: 2021-12-08 | Stop reason: HOSPADM

## 2021-12-07 RX ADMIN — SODIUM CHLORIDE, POTASSIUM CHLORIDE, SODIUM LACTATE AND CALCIUM CHLORIDE 1000 ML: 600; 310; 30; 20 INJECTION, SOLUTION INTRAVENOUS at 01:12

## 2021-12-07 RX ADMIN — MORPHINE SULFATE 4 MG: 4 INJECTION INTRAVENOUS at 01:10

## 2021-12-07 RX ADMIN — ONDANSETRON 4 MG: 2 INJECTION INTRAMUSCULAR; INTRAVENOUS at 01:27

## 2021-12-07 RX ADMIN — HEPARIN SODIUM 5000 UNITS: 5000 INJECTION INTRAVENOUS; SUBCUTANEOUS at 23:04

## 2021-12-07 RX ADMIN — ROSUVASTATIN CALCIUM 40 MG: 40 TABLET, FILM COATED ORAL at 23:03

## 2021-12-07 RX ADMIN — ACETAMINOPHEN 1000 MG: 500 TABLET ORAL at 01:25

## 2021-12-07 RX ADMIN — HEPARIN SODIUM 5000 UNITS: 5000 INJECTION INTRAVENOUS; SUBCUTANEOUS at 14:00

## 2021-12-07 RX ADMIN — HYDRALAZINE HYDROCHLORIDE 50 MG: 25 TABLET, FILM COATED ORAL at 23:03

## 2021-12-07 RX ADMIN — ISOSORBIDE MONONITRATE 30 MG: 30 TABLET, EXTENDED RELEASE ORAL at 14:00

## 2021-12-07 RX ADMIN — SODIUM CHLORIDE: 9 INJECTION, SOLUTION INTRAVENOUS at 13:40

## 2021-12-07 RX ADMIN — Medication 10 ML: at 21:30

## 2021-12-07 RX ADMIN — SODIUM CHLORIDE 1500 MG: 900 INJECTION INTRAVENOUS at 14:55

## 2021-12-07 RX ADMIN — GUAIFENESIN AND DEXTROMETHORPHAN 5 ML: 100; 10 SYRUP ORAL at 23:04

## 2021-12-07 RX ADMIN — VANCOMYCIN HYDROCHLORIDE 1250 MG: 10 INJECTION, POWDER, LYOPHILIZED, FOR SOLUTION INTRAVENOUS at 09:44

## 2021-12-07 RX ADMIN — CEFEPIME HYDROCHLORIDE 2000 MG: 2 INJECTION, POWDER, FOR SOLUTION INTRAVENOUS at 08:53

## 2021-12-07 RX ADMIN — RANOLAZINE 500 MG: 500 TABLET, EXTENDED RELEASE ORAL at 23:03

## 2021-12-07 RX ADMIN — CLOPIDOGREL BISULFATE 75 MG: 75 TABLET ORAL at 14:00

## 2021-12-07 RX ADMIN — CARVEDILOL 25 MG: 25 TABLET, FILM COATED ORAL at 23:03

## 2021-12-07 RX ADMIN — RANOLAZINE 500 MG: 500 TABLET, EXTENDED RELEASE ORAL at 14:33

## 2021-12-07 ASSESSMENT — ENCOUNTER SYMPTOMS
WHEEZING: 0
ABDOMINAL PAIN: 1
DIARRHEA: 0
BACK PAIN: 0
EYE DISCHARGE: 0
RHINORRHEA: 0
TROUBLE SWALLOWING: 0
BLOOD IN STOOL: 0
PHOTOPHOBIA: 0
CHEST TIGHTNESS: 0
EYE REDNESS: 0
SORE THROAT: 0
SHORTNESS OF BREATH: 0
SINUS PAIN: 0
VOMITING: 0
NAUSEA: 1
ABDOMINAL PAIN: 0
NAUSEA: 0
EYE PAIN: 0
DIARRHEA: 1
VOMITING: 1
COUGH: 1
CONSTIPATION: 0

## 2021-12-07 ASSESSMENT — PAIN SCALES - GENERAL
PAINLEVEL_OUTOF10: 0
PAINLEVEL_OUTOF10: 0
PAINLEVEL_OUTOF10: 7
PAINLEVEL_OUTOF10: 0
PAINLEVEL_OUTOF10: 7

## 2021-12-07 NOTE — ED NOTES
Pt sleeping in stretcher comfortably with no acute signs of distress noted. VSS. Pt states he is feeling better.       Radha Hernandez RN  12/07/21 [id number]

## 2021-12-07 NOTE — ACP (ADVANCE CARE PLANNING)
Advanced Care Planning Note. Purpose of Encounter: Advanced care planning in light of CAD  Parties In Attendance: Patient  Decisional Capacity: Yes  Subjective: Patient has abdominal pain and coughing  Objective: Cr 5.6  Goals of Care Determination: Patient wants full support (CPR, vent, HD, trach, PEG, surgery)  Plan:  IV Vanco and IV Cefepime. Droplet plus. Renal and ID consults  Code Status: Full code   Time spent on Advanced care Plannin minutes  Advanced Care Planning Documents: Completed advanced directives on chart, vince is the POA.     Sotero De MD  2021 10:44 AM

## 2021-12-07 NOTE — ED PROVIDER NOTES
Emergency Department Encounter  Location: [address]    Patient: Ambrocio Cain  MRN: [id number]  : 1965  Date of evaluation: 2021  ED Provider: David Tompkins MD    7:00 AM  Ambrocio Cain was checked out to me by Dr. Dante Velasquez. Please see his/her initial documentation for details of the patient's initial ED presentation, physical exam and completed studies. In brief, Ambrocio Cain is a 49 y.o. male that presented to the emergency department with abdominal pain nausea and vomiting. Patient is also febrile. He was recently diagnosed with COVID-19 on Thursday. Symptoms have persisted since then. Patient's history significant for peritoneal dialysis. On exam, the patient appears well-hydrated, well-nourished, and in no acute distress. Mucous membranes are moist. Speech is clear. Breathing is unlabored. Skin is dry. Mental status is normal. The patient moves all extremities. Face is symmetric without droop.     I have reviewed and interpreted all of the currently available lab results and diagnostics from this visit:    Labs  Results for orders placed or performed during the hospital encounter of 21   CBC Auto Differential   Result Value Ref Range    WBC 5.2 4.0 - 11.0 K/uL    RBC 4.93 4.20 - 5.90 M/uL    Hemoglobin 14.4 13.5 - 17.5 g/dL    Hematocrit 44.2 40.5 - 52.5 %    MCV 89.6 80.0 - 100.0 fL    MCH 29.2 26.0 - 34.0 pg    MCHC 32.6 31.0 - 36.0 g/dL    RDW 14.4 12.4 - 15.4 %    Platelets 98 (L) 390 - 450 K/uL    MPV 11.0 (H) 5.0 - 10.5 fL    PLATELET SLIDE REVIEW Decreased     SLIDE REVIEW see below     Neutrophils % 89.5 %    Lymphocytes % 6.0 %    Monocytes % 4.0 %    Eosinophils % 0.1 %    Basophils % 0.4 %    Neutrophils Absolute 4.6 1.7 - 7.7 K/uL    Lymphocytes Absolute 0.3 (L) 1.0 - 5.1 K/uL    Monocytes Absolute 0.2 0.0 - 1.3 K/uL    Eosinophils Absolute 0.0 0.0 - 0.6 K/uL    Basophils Absolute 0.0 0.0 - 0.2 K/uL   Comprehensive metabolic panel 5 /HPF       Imaging  CT CHEST ABDOMEN PELVIS WO CONTRAST   Final Result   1. Multifocal ground-glass opacities in the bilateral lungs, suspected to   represent COVID-19 pneumonia. 2. No acute finding in the abdomen or pelvis. 3. Prior right lower lobectomy with chronic mild pleural fluid. 4. Peritoneal dialysis catheter with atrophy of native kidneys. MDM and ED Course  WBC count of 400 indicates likelihood of peritonitis, especially given hazy effluent, abdominal pain, and fever. Although fever may be due to COVID-19, we need to treat presumptively at this point. I consulted Dr. Rolly Hull with nephrology. Patient presented with nausea, vomiting, abdominal pain, and fever. He has a recent COVID-19 diagnosis as well, which we thought was the source of fever. However, his procal was elvated and he undergoes peritoneal dialysis, so peritonitis was a possibility. The cell count of his diasylate showed 408 WBCs, suggestive of peritonitis, so I started treatment with Vanc and Cefepime. I confirmed this in consultation with Dr. Rolly Hull from his nephrology group. He is currently stable. Final Impression  1. Fever, unspecified fever cause    2. Peritonitis associated with peritoneal dialysis, initial encounter ([facility] [address]    3. COVID-19 virus infection        Blood pressure 137/67, pulse 61, temperature 99.3 °F (37.4 °C), temperature source Oral, resp. rate 14, height 5' 8\" (1.727 m), weight 193 lb (87.5 kg), SpO2 98 %. Disposition:  Admit    [facility]    This chart was generated using the [employer] dictation system. I created this record but it may contain dictation errors given the limitations of this technology.        Walker Tena MD  12/07/21 [id number]

## 2021-12-07 NOTE — ED NOTES
Bed: 07  Expected date:   Expected time:   Means of arrival:   Comments:  Dianne Maki RN  12/07/21 8572

## 2021-12-07 NOTE — ED PROVIDER NOTES
[address] [facility]        Pt Name: Ivory Malhotra  MRN: [id number]  [place] 1965  Date of evaluation: 12/6/2021  Provider: JAYLEN Bernabe - NISHA  PCP: Jaelyn Cummings MD  Note Started: 1:10 AM EST        I have seen and evaluated this patient with my supervising physician megan    [address] [facility]       Chief Complaint   Patient presents with    Nausea     Patient tested positive for covid 19 on Thursday, having Headache, Fevers, Nausea and Vomiting. Unable to keep fluids down since Sunday morning. Febrile at triage. HISTORY OF PRESENT ILLNESS   (Location, Timing/Onset, Context/Setting, Quality, Duration, Modifying Factors, Severity, Associated Signs and Symptoms)  Note limiting factors. Chief Complaint: vomiting, covid +     Ivory Malhotra is a 49 y.o. male who presents to the emergency department with complaint of intractable vomiting, nausea and diarrhea since Steve morning. The patient reports that he is concerned for dehydration. States that he is peritoneal dialysis patient and did dialyze himself even though he is sick, concerned that he is taken off too much fluid. He is febrile tonight and is positive for COVID-19, tested positive on Thursday. States that he received 2 doses of Covid vaccine. Denies any lightheadedness, dizziness, visual disturbances. No chest pain or pressure. No neck or back pain. No shortness of breath, cough, or congestion. No constipation, or dysuria. No rash. Nursing Notes were all reviewed and agreed with or any disagreements were addressed in the HPI. REVIEW OF SYSTEMS    (2-9 systems for level 4, 10 or more for level 5)     Review of Systems   Constitutional: Positive for activity change, appetite change, chills, fatigue and fever. Respiratory: Negative for chest tightness and shortness of breath. Cardiovascular: Negative for chest pain. Gastrointestinal: Positive for diarrhea, nausea and vomiting.  Negative for abdominal pain. Genitourinary: Negative for dysuria. Musculoskeletal: Positive for arthralgias and myalgias. Neurological: Positive for headaches. All other systems reviewed and are negative. Positives and Pertinent negatives as per HPI. Except as noted above in the ROS, all other systems were reviewed and negative.        PAST MEDICAL HISTORY     Past Medical History:   Diagnosis Date    Angina [facility] rest ([facility]     Cardiomyopathy ([facility]     Carotid artery stenosis 10/25/2016    SUZANNA stented with 8 x 30 mm non-tapered Xact stent    CHF (congestive heart failure) ([facility] 3/3/15    EF 30%     CKD (chronic kidney disease) stage 2, GFR 60-89 ml/min     Coronary artery disease     sp CABG UC    Diabetic peripheral neuropathy (HCC)     Diastolic HF (heart failure) ([facility]     Hyperlipidemia with target LDL less than 70     Hypertension goal BP (blood pressure) < 130/80     PVD (peripheral vascular disease) (HCC)     Seizures (HCC)     Type 1 diabetes mellitus with chronic kidney disease ([facility])     c-peptide <0.1 in 2015         SURGICAL HISTORY     Past Surgical History:   Procedure Laterality Date    CARDIAC CATHETERIZATION      CARDIAC SURGERY      triple bypass    CAROTID STENT PLACEMENT Right     CORONARY ARTERY BYPASS GRAFT      HERNIA REPAIR      LAPAROSCOPY INSERTION PERITONEAL CATHETER N/A 6/3/2020    LAPAROSCOPIC PLACEMENT OF PERITONEAL DIALYSIS  CATHETER performed by Debora Solano MD at [address]       Current Discharge Medication List      CONTINUE these medications which have NOT CHANGED    Details   NOVOLOG 100 UNIT/ML injection vial INJECT 40 TO 50 UNITS UNDER THE SKIN DAILY VIA PUMP  Qty: 10 mL, Refills: 3      ezetimibe (ZETIA) 10 MG tablet TAKE ONE TABLET BY MOUTH DAILY  Qty: 30 tablet, Refills: 3      clopidogrel (PLAVIX) 75 MG tablet Take 1 tablet by mouth daily  Qty: 90 tablet, Refills: 0      BASAGLAR KWIKPEN 100 UNIT/ML injection pen INJECT 20 UNITS UNDER THE SKIN ONCE NIGHTLY WHEN OFF PUMP  Qty: 5 pen, Refills: 3    Associated Diagnoses: Type 1 diabetes mellitus with stage 4 chronic kidney disease (HCC)      ranolazine (RANEXA) 500 MG extended release tablet TAKE ONE TABLET BY MOUTH TWICE A DAY  Qty: 180 tablet, Refills: 1    Associated Diagnoses: Coronary artery disease due to lipid rich plaque      hydrALAZINE (APRESOLINE) 50 MG tablet Take 1 tablet by mouth every 8 hours  Qty: 90 tablet, Refills: 3      carvedilol (COREG) 25 MG tablet Take 2 tablets by mouth daily (with breakfast)  Qty: 60 tablet, Refills: 3      Continuous Blood Gluc Sensor (DEXCOM G6 SENSOR) MISC 1 Device by Does not apply route daily  Qty: 1 each, Refills: 0      aspirin 81 MG EC tablet Take 1 tablet by mouth daily  Qty: 30 tablet, Refills: 3      rosuvastatin (CRESTOR) 40 MG tablet Take 1 tablet by mouth nightly  Qty: 30 tablet, Refills: 3      Cholecalciferol (VITAMIN D3 ULTRA POTENCY) 1.25 MG (22477 UT) TABS Take 50,000 Units by mouth once a week (on Saturdays)      blood glucose test strips (ACCU-CHEK GUIDE) strip TEST BLOOD SUGAR 6 TIMES A DAY  Qty: 200 strip, Refills: 5      Methylcobalamin (B-12) 1000 MCG TBDP Place 1,000 mcg under the tongue daily      isosorbide mononitrate (IMDUR) 30 MG extended release tablet Take 30 mg by mouth daily      nitroGLYCERIN (NITROSTAT) 0.4 MG SL tablet DISSOLVE 1 TAB UNDER TONGUE FOR CHEST PAIN - IF PAIN REMAINS AFTER 5 MIN, CALL 911 AND REPEAT DOSE.  MAX 3 TABS IN 15 MINUTES  Qty: 25 tablet, Refills: 2               ALLERGIES     Iodides, Shellfish-derived products, and Atorvastatin calcium    FAMILYHISTORY       Family History   Problem Relation Age of Onset    Coronary Art Dis Father     Diabetes Father     Coronary Art Dis Paternal Grandmother     Diabetes Paternal Grandmother     Hypertension Mother     Other Mother         Epilepsy    Other Sister         Thyroid disease  Heart murmur    Mult Sclerosis Brother     Arthritis Brother     Sleep Apnea Brother           SOCIAL HISTORY       Social History     Tobacco Use    Smoking status: Never Smoker    Smokeless tobacco: Never Used   Vaping Use    Vaping Use: Never used   Substance Use Topics    Alcohol use: Yes     Comment: one drink a month    Drug use: No       SCREENINGS             PHYSICAL EXAM    (up to 7 for level 4, 8 or more for level 5)     ED Triage Vitals [12/06/21 2340]   BP Temp Temp Source Pulse Resp SpO2 Height Weight   (!) 180/83 102.2 °F (39 °C) Oral 97 20 94 % 5' 8\" (1.727 m) 193 lb (87.5 kg)       Physical Exam  Vitals and nursing note reviewed. Constitutional:       Appearance: He is well-developed. He is not diaphoretic. HENT:      Head: Normocephalic and atraumatic. Right Ear: External ear normal.      Left Ear: External ear normal.   Eyes:      General:         Right eye: No discharge. Left eye: No discharge. Neck:      Vascular: No JVD. Cardiovascular:      Rate and Rhythm: Normal rate and regular rhythm. Pulses: Normal pulses. Heart sounds: Normal heart sounds. Pulmonary:      Effort: Pulmonary effort is normal. No respiratory distress. Breath sounds: Normal breath sounds. Abdominal:      Palpations: Abdomen is soft. Tenderness: There is no abdominal tenderness. Musculoskeletal:         General: Normal range of motion. Cervical back: Normal range of motion and neck supple. Skin:     General: Skin is warm and dry. Coloration: Skin is not pale. Neurological:      Mental Status: He is alert and oriented to person, place, and time.    Psychiatric:         Behavior: Behavior normal.         DIAGNOSTIC RESULTS   LABS:    Labs Reviewed   CBC WITH AUTO DIFFERENTIAL - Abnormal; Notable for the following components:       Result Value    Platelets 98 (*)     MPV 11.0 (*)     Lymphocytes Absolute 0.3 (*)     All other components within normal limits    Narrative:     Performed at:  [facility]) - [facility]  [address] [facility], [address]   Phone [phone]   COMPREHENSIVE METABOLIC PANEL - Abnormal; Notable for the following components:    Sodium 135 (*)     CO2 20 (*)     Glucose 202 (*)     BUN 35 (*)     CREATININE 5.6 (*)     GFR Non- 11 (*)     GFR  13 (*)     Calcium 7.7 (*)     Albumin 3.0 (*)     Albumin/Globulin Ratio 0.9 (*)     All other components within normal limits    Narrative:     Melissa Rodríguez  [facility] tel. [phone],  Chemistry results called to and read back by Uri Betsy, 12/07/2021 01:14,  by Bibiana Holt  Performed at:  [facility] [address], [facility], [address] [place] [address]   Phone [phone]   PROCALCITONIN - Abnormal; Notable for the following components:    Procalcitonin 0.86 (*)     All other components within normal limits    Narrative:     Melissa Rodríguez  [facility] tel. [phone],  Chemistry results called to and read back by Uri Betsy, 12/07/2021 01:14,  by [address] Binta Holt  Performed at:  [facility] [address], [facility], [facility] [employer]   Phone [phone]   URINE RT REFLEX TO CULTURE - Abnormal; Notable for the following components:    Clarity, UA CLOUDY (*)     Glucose, Ur 100 (*)     Ketones, Urine TRACE (*)     Blood, Urine TRACE (*)     Protein, UA >=300 (*)     All other components within normal limits    Narrative:     Performed at:  [facility] [address], [facility], [facility] [employer]   Phone [phone]   D-DIMER, QUANTITATIVE - Abnormal; Notable for the following components:    D-Dimer, Quant 328 (*)     All other components within normal limits    Narrative:     Performed at:  [facility] [address], [facility], [facility] [employer]   Phone [phone]   CULTURE, BLOOD 1   CULTURE, BLOOD 2   CULTURE, BODY FLUID    Narrative:     ORDER#: [id number]                          ORDERED BY: Clayton Boyd THEODORE  SOURCE: [employer]                          COLLECTED:  12/07/21 05:00  ANTIBIOTICS AT PEGGY.:                      RECEIVED :  12/07/21 05:20  Performed at:  [facility]  [address]   Phone [phone]   CULTURE, BODY FLUID   GRAM STAIN   MRSA DNA PROBE, NASAL   CULTURE, BODY FLUID   LACTATE, SEPSIS    Narrative:     Performed at:  [facility] [address], [facility], [address]   Phone [phone]   LIPASE    Narrative:     Arcadio Floras  [facility] tel. [phone],  Chemistry results called to and read back by Barb Reno, 12/07/2021 01:14,  by Bibiana Holt  Performed at:  [facility] [address], [facility], [address]   Phone [phone]   BODY FLUID CELL COUNT WITH DIFFERENTIAL    Narrative:     Performed at:  [facility] [address], [facility], [address] [employer]   Phone [phone]   MICROSCOPIC URINALYSIS    Narrative:     Performed at:  [facility] [address], [facility], [address]   Phone [phone]   HEMOGLOBIN A1C   PROCALCITONIN   C-REACTIVE PROTEIN   LACTATE DEHYDROGENASE   FERRITIN   COVID-19   COVID-19   COVID-19   COVID-19   COVID-19   BODY FLUID CELL COUNT WITH DIFFERENTIAL   CBC WITH AUTO DIFFERENTIAL   COMPREHENSIVE METABOLIC PANEL W/ REFLEX TO MG FOR LOW K   PROTIME-INR   APTT   FIBRINOGEN   C-REACTIVE PROTEIN   D-DIMER, QUANTITATIVE   TROPONIN   LACTIC ACID, PLASMA   VITAMIN D 25 HYDROXY   BODY FLUID CELL COUNT WITH DIFFERENTIAL   POCT GLUCOSE   POCT GLUCOSE   POCT GLUCOSE       When ordered only abnormal lab results are displayed. All other labs were within normal range or not returned as of this dictation. EKG: When ordered, EKG's are interpreted by the Emergency Department Physician in the absence of a cardiologist.  Please see their note for interpretation of EKG.     RADIOLOGY: Non-plain film images such as CT, Ultrasound and MRI are read by the radiologist. Plain radiographic images are visualized and preliminarily interpreted by the ED Provider with the below findings:        Interpretation per the Radiologist below, if available at the time of this note:    CT CHEST [address] [facility]   Final Result   1. Multifocal ground-glass opacities in the bilateral lungs, suspected to   represent COVID-19 pneumonia. 2. No acute finding in the abdomen or pelvis. 3. Prior right lower lobectomy with chronic mild pleural fluid. 4. Peritoneal dialysis catheter with atrophy of native kidneys. No results found.         PROCEDURES   Unless otherwise noted below, none     Procedures    CRITICAL CARE TIME   N/A    CONSULTS:  IP CONSULT TO NEPHROLOGY  IP CONSULT TO NEPHROLOGY  IP CONSULT TO INFECTIOUS DISEASES  IP CONSULT TO NEPHROLOGY      EMERGENCY DEPARTMENT COURSE and DIFFERENTIAL DIAGNOSIS/MDM:   Vitals:    Vitals:    12/07/21 1600 12/07/21 1700 12/07/21 1800 12/07/21 1847   BP: (!) 151/48   (!) 177/74   Pulse: 68 70 69 79   Resp: 19 16 15 17   Temp:    97.8 °F (36.6 °C)   TempSrc:    Temporal   SpO2: 97% 98% 98% 100%   Weight:    192 lb 14.4 oz (87.5 kg)   Height:    5' 8\" (1.727 m)       Patient was given the following medications:  Medications   aspirin EC tablet 81 mg (81 mg Oral Not Given 12/7/21 1546)   insulin glargine (LANTUS;BASAGLAR) injection pen 20 Units (has no administration in time range)   carvedilol (COREG) tablet 25 mg (25 mg Oral Not Given 12/7/21 1547)   Cholecalciferol TABS 50,000 Units (50,000 Units Oral Not Given 12/7/21 1547)   clopidogrel (PLAVIX) tablet 75 mg (75 mg Oral Given 12/7/21 1400)   hydrALAZINE (APRESOLINE) tablet 50 mg (50 mg Oral Not Given 12/7/21 1548)   isosorbide mononitrate (IMDUR) extended release tablet 30 mg (30 mg Oral Given 12/7/21 1400)   nitroGLYCERIN (NITROSTAT) SL tablet 0.4 mg (has no administration in time range)   ranolazine [facility]) extended release tablet 500 mg (500 mg Oral Given 12/7/21 1433)   rosuvastatin (CRESTOR) tablet 40 mg (has no administration in time range)   sodium chloride flush 0.9 % injection 5-40 mL (5 mLs IntraVENous Not Given 12/7/21 1401)   sodium chloride flush 0.9 % injection 5-40 mL (has no administration in time range)   0.9 % sodium chloride infusion (has no administration in time range)   ondansetron (ZOFRAN-ODT) disintegrating tablet 4 mg (has no administration in time range)     Or   ondansetron (ZOFRAN) injection 4 mg (has no administration in time range)   polyethylene glycol (GLYCOLAX) packet 17 g (has no administration in time range)   acetaminophen (TYLENOL) tablet 650 mg (has no administration in time range)     Or   acetaminophen (TYLENOL) suppository 650 mg (has no administration in time range)   glucose (GLUTOSE) 40 % oral gel 15 g (has no administration in time range)   dextrose 50 % IV solution (has no administration in time range)   glucagon (rDNA) injection 1 mg (has no administration in time range)   dextrose 5 % solution (has no administration in time range)   0.9 % sodium chloride infusion ( IntraVENous New Bag 12/7/21 1340)   heparin (porcine) injection 5,000 Units (5,000 Units SubCUTAneous Given 12/7/21 1400)   insulin lispro (1 Unit Dial) 0-18 Units (0 Units SubCUTAneous Not Given 12/7/21 1828)   insulin lispro (1 Unit Dial) 0-9 Units (has no administration in time range)   HYDROmorphone HCl PF (DILAUDID) injection 0.5 mg (has no administration in time range)   promethazine (PHENERGAN) injection 12.5 mg (has no administration in time range)   dianeal lo-miller 1.5% 5,000 mL (5,000 mLs IntraPERitoneal Not Given 12/7/21 1549)   dianeal lo-miller 1.5% 5,000 mL (5,000 mLs IntraPERitoneal Not Given 12/7/21 1548)   gentamicin (GARAMYCIN) 0.1 % cream ( Topical Not Given 12/7/21 1548)   ampicillin-sulbactam (UNASYN) 1500 mg IVPB minibag (1,500 mg IntraVENous New Bag 12/7/21 6453)   lactated ringers infusion 1,000 mL (0 mLs IntraVENous Stopped 12/7/21 0200)   morphine injection 4 mg (4 mg IntraVENous Given 12/7/21 0110)   acetaminophen (TYLENOL) tablet 1,000 mg (1,000 mg Oral Given 12/7/21 0125)   vancomycin (VANCOCIN) 1,250 mg in dextrose 5 % 250 mL IVPB (0 mg IntraVENous Stopped 12/7/21 1254)   cefepime (MAXIPIME) 2000 mg IVPB minibag (0 mg IntraVENous Stopped 12/7/21 0943)           Briefly, this is a 79-year-old dialysis patient who presents tonight Covid positive with complaint of vomiting and diarrhea with concern for dehydration. IV established, patient was given pain medication Tylenol, and lactated Ringer's. Some basic labs were ordered, the patient does have end-stage renal disease and is on active peritoneal dialysis. His lactate was normal at 1.1 and normal white blood cell count, however his procalcitonin was elevated at 0.86, causing concern that he may have infected peritoneal fluid. I did call nephrology on-call and spoke with Dr. Parrish Lr, he does recommend collecting a sample of the patient's peritoneal fluid and sending for cell count as well as culture. I did ask one of the nurses to complete this task and they were able to do this. If the labs are concerning, the patient will be admitted for IV antibiotics. Please see attending physician note as the patient's visit did exceed the length of my shift. FINAL IMPRESSION      1. Fever, unspecified fever cause    2. Peritonitis associated with peritoneal dialysis, initial encounter ([facility]    3. COVID-19 virus infection          DISPOSITION/PLAN   DISPOSITION Admitted 12/07/2021 10:34:58 AM      PATIENT REFERRED TO:  No follow-up provider specified.     DISCHARGE MEDICATIONS:  Current Discharge Medication List          DISCONTINUED MEDICATIONS:  Current Discharge Medication List                 (Please note that portions of this note were completed with a voice recognition program.  Efforts were made to edit the dictations but occasionally words are mis-transcribed.)    JAYLEN Bernabe CNP (electronically signed)            JAYLEN Bernabe CNP  12/07/21 1932

## 2021-12-07 NOTE — H&P
HOSPITALISTS HISTORY AND PHYSICAL    12/7/2021 10:35 AM    Patient Information:  Mauricio Cody is a 49 y.o. male [id number]  PCP:  Krystal Busby MD (Tel: [phone] )    Chief complaint:    Chief Complaint   Patient presents with    Nausea     Patient tested positive for covid 19 on Thursday, having Headache, Fevers, Nausea and Vomiting. Unable to keep fluids down since Sunday morning. Febrile at triage. History of Present Illness:  Mike Gorman is a 49 y.o. male with history of poorly controlled diabetes type 1 diagnosed at the age of 17, peripheral neuropathy, coronary artery disease/CABG/CHF, carotid artery stenosis status post right stenting, hypertension, hyperlipidemia, peripheral vascular disease, ESRD on PD, recently diagnosed with COVID 19 on 12/2/21 came to ER with complaints of nausea, vomiting, abdominal pain, cough and fevers. Patient unable to eat since Sunday. No diarrhea, melena, hematochezia or dysphagia. No CP or SOB. No HA or dizziness. Noted to have hazy effluent fluid from PD catheter and given IV Vanco and Cefepime in ED. Otherwise complete ROS is negative unless listed above. REVIEW OF SYSTEMS:   Pertinent positives as noted in HPI. All other systems were reviewed and are negative. Past Medical History:   has a past medical history of Angina at rest [facility]), Cardiomyopathy [facility]), Carotid artery stenosis, CHF (congestive heart failure) ([facility] [facility] CKD (chronic kidney disease) stage 2, GFR 60-89 ml/min, Coronary artery disease, Diabetic peripheral neuropathy ([facility] [facility] Diastolic HF (heart failure) ([facility] [address] [facility] Hyperlipidemia with target LDL less than 70, Hypertension goal BP (blood pressure) < 130/80, PVD (peripheral vascular disease) ([facility] [address] Seizures ([facility] [address] and Type 1 diabetes mellitus with chronic kidney disease ([facility] [address]      Past Surgical History:   has a past surgical history that includes Cardiac surgery; Coronary artery bypass graft; Cardiac catheterization; hernia repair; Tonsillectomy; LAPAROSCOPY INSERTION PERITONEAL CATHETER (N/A, 6/3/2020); and Carotid stent placement (Right). Medications:  No current facility-administered medications on file prior to encounter. Current Outpatient Medications on File Prior to Encounter   Medication Sig Dispense Refill    NOVOLOG 100 UNIT/ML injection vial INJECT 40 TO 50 UNITS UNDER THE SKIN DAILY VIA PUMP 10 mL 3    ezetimibe (ZETIA) 10 MG tablet TAKE ONE TABLET BY MOUTH DAILY 30 tablet 3    clopidogrel (PLAVIX) 75 MG tablet Take 1 tablet by mouth daily 90 tablet 0    BASAGLAR KWIKPEN 100 UNIT/ML injection pen INJECT 20 UNITS UNDER THE SKIN ONCE NIGHTLY WHEN OFF PUMP 5 pen 3    ranolazine (RANEXA) 500 MG extended release tablet TAKE ONE TABLET BY MOUTH TWICE A  tablet 1    hydrALAZINE (APRESOLINE) 50 MG tablet Take 1 tablet by mouth every 8 hours 90 tablet 3    carvedilol (COREG) 25 MG tablet Take 2 tablets by mouth daily (with breakfast) (Patient taking differently: Take 25 mg by mouth 2 times daily ) 60 tablet 3    Continuous Blood Gluc Sensor (DEXCOM G6 SENSOR) MISC 1 Device by Does not apply route daily 1 each 0    aspirin 81 MG EC tablet Take 1 tablet by mouth daily 30 tablet 3    rosuvastatin (CRESTOR) 40 MG tablet Take 1 tablet by mouth nightly 30 tablet 3    Cholecalciferol (VITAMIN D3 ULTRA POTENCY) 1.25 MG (75114 UT) TABS Take 50,000 Units by mouth once a week (on Saturdays)      blood glucose test strips (ACCU-CHEK GUIDE) strip TEST BLOOD SUGAR 6 TIMES A  strip 5    Methylcobalamin (B-12) 1000 MCG TBDP Place 1,000 mcg under the tongue daily      isosorbide mononitrate (IMDUR) 30 MG extended release tablet Take 30 mg by mouth daily      nitroGLYCERIN (NITROSTAT) 0.4 MG SL tablet DISSOLVE 1 TAB UNDER TONGUE FOR CHEST PAIN - IF PAIN REMAINS AFTER 5 MIN, CALL 911 AND REPEAT DOSE.  MAX 3 TABS IN 15 MINUTES 25 tablet 2       Allergies: Allergies   Allergen Reactions    Iodides Anaphylaxis    Shellfish-Derived Products Anaphylaxis    Atorvastatin Calcium Rash        Social History:  Patient Lives with vince   reports that he has never smoked. He has never used smokeless tobacco. He reports current alcohol use. He reports that he does not use drugs. Family History:  family history includes Arthritis in his brother; Coronary Art Dis in his father and paternal grandmother; Diabetes in his father and paternal grandmother; Hypertension in his mother; Mult Sclerosis in his brother; Other in his mother and sister; Sleep Apnea in his brother. ,    Physical Exam:  /67   Pulse 61   Temp 99.3 °F (37.4 °C) (Oral)   Resp 14   Ht 5' 8\" (1.727 m)   Wt 193 lb (87.5 kg)   SpO2 98%   BMI 29.35 kg/m²     General appearance:  Appears comfortable. Well nourished  Eyes: Sclera clear, pupils equal  ENT: Moist mucus membranes, no thrush. Trachea midline. Cardiovascular: Regular rhythm, normal S1, S2. No murmur, gallop, rub. No edema in lower extremities  Respiratory: BL rhonchi. No wheezing or rales. Gastrointestinal: Abdomen soft, diffusely tender, not distended, normal bowel sounds, PD catheter  Musculoskeletal: No cyanosis in digits, neck supple  Neurology: Grossly intact. Alert and oriented in time, place and person. No speech or motor deficits  Psychiatry: Appropriate affect.  Not agitated  Skin: Warm, dry, normal turgor, no rash  Brisk capillary refill, peripheral pulses palpable   Labs:  CBC:   Lab Results   Component Value Date    WBC 5.2 12/07/2021    RBC 4.93 12/07/2021    HGB 14.4 12/07/2021    HCT 44.2 12/07/2021    MCV 89.6 12/07/2021    MCH 29.2 12/07/2021    MCHC 32.6 12/07/2021    RDW 14.4 12/07/2021    PLT 98 12/07/2021    MPV 11.0 12/07/2021     BMP:    Lab Results   Component Value Date     12/07/2021    K 4.7 12/07/2021    K 3.9 04/08/2021     12/07/2021    CO2 20 12/07/2021    BUN 35 12/07/2021    CREATININE 5.6 12/07/2021    CALCIUM 7.7 12/07/2021    GFRAA 13 12/07/2021    GFRAA 44 06/15/2013    LABGLOM 11 12/07/2021    GLUCOSE 202 12/07/2021     CT CHEST ABDOMEN PELVIS WO CONTRAST   Final Result   1. Multifocal ground-glass opacities in the bilateral lungs, suspected to   represent COVID-19 pneumonia. 2. No acute finding in the abdomen or pelvis. 3. Prior right lower lobectomy with chronic mild pleural fluid. 4. Peritoneal dialysis catheter with atrophy of native kidneys. Problem List  Principal Problem:    Peritonitis associated with peritoneal dialysis [facility])  Active Problems:    Coronary artery disease involving native heart with angina pectoris (HCC)    Type 1 diabetes mellitus with chronic kidney disease ([facility] [address]    PVD (peripheral vascular disease) ([facility] [address]    HTN (hypertension), benign    S/P CABG (coronary artery bypass graft)    Moderate obstructive sleep apnea    ESRD on peritoneal dialysis ([facility] [address]    COVID-19    Sepsis ([facility] [address]    Dialysis-associated peritonitis ([facility] [address]  Resolved Problems:    * No resolved hospital problems. *        Assessment/Plan:   1. IV Vanco and Cefepime for peritonitis with PD catheter  2. Droplet plus for COVID  3. IVF X 24 hrs  4. Dilaudid IV PRN pain  5. Phenergan IV PRN nausea  6. Renal consult for ESRD on PD  7. ID consult for fevers  8. Continue Lantus 20 U qhs and high SSI (on pump normally)  9. Hold on Decadron at this time  10. PT/OT eval      DVT prophylaxis Hep SQ  Code status Full code  Diet Regular  IV access Peripheral  Real Catheter No    Admit as inpatient. I anticipate hospitalization spanning more than two midnights for investigation and treatment of the above medically necessary diagnoses. Discussed with patient. Will see what all think. Hopefully back home when ready.     Miranda Adhikari MD    12/7/2021 10:35 AM

## 2021-12-07 NOTE — ED PROVIDER NOTES
I independently performed a history and physical on [address] [place] [address].   All diagnostic, treatment, and disposition decisions were made by myself in conjunction with the advanced practice provider. Briefly, this is a 49 y.o. male here for fever, nausea and vomiting. Also with watery nonbloody diarrhea. Patient with known diagnosis of COVID-19 pneumonia. Currently on peritoneal dialysis, he is concerned that given his fluid losses from vomiting and diarrhea he may be dehydrated. Also reports upper abdominal pain which he attributes to vomiting, however is concerned for peritonitis. Denies having peritonitis previously. .    On exam, patient febrile, mildly ill-appearing however nontoxic. Margurette Fox No distress. Heart RRR. Lungs CTAB. Abdomen soft, nondistended, nontender to palpation in all quadrants. Insulin infusion pump and PD catheter are noted in lower abdominal wall. No surrounding erythema, discharge or induration. Screenings            MDM    Patient febrile, mildly lopinavir nontoxic. He is in no distress. No hypoxia or increased work of breathing. Laboratory work-up consistent with known ESRD on PD, no clinically significant electrolyte derangement. Patient clinically does appear mildly dehydrated and was given judicious IV fluids, certainly no evidence of fluid overload at this time. At time of my evaluation, patient reports he feels \"1000% better,\" and his abdomen is benign to palpation. He does have elevated procalcitonin, may be secondary to ESRD however this is still concerning for potential bacterial infection. Case was discussed with Dr. Fernanda West for nephrology, recommends cell count and culture of diasylate to exclude peritonitis. Will defer any antibiotics until this result. At time of end of shift, cell count remains pending. I discussed case with Dr. Shakeel Silvestre who will assume care at 0700 to follow-up on results and disposition.   At time of signout, patient alert, no distress and

## 2021-12-07 NOTE — CONSULTS
Nephrology Consult Note  [phone]   [facility] [employer] [facility]           Reason for Consult: ESRD on PD, PD related peritonitis     HISTORY OF PRESENT ILLNESS:                The patient is a 49 y.o.male with significant past medical history of ESRD on PD, HTN, DM I on insulin pump, CAD/CHF/CABG, PVD, Seizures, CHF, KELLY s/p SUZANNA, recent COVID-19 presented with fatigue, cough, fever and abdominal pain. received monoclonal Ab infusion on 12/6 as out-pt   CT of the C/A/P showed multifocal ground glass opacities in the both lungs, suspected to be covid-19 pneumonia. Abdominal pain started 1-2 days ago, labs consistent with peritonitis.   Received Cefepime and Vancomycin IV in the ER  Pt seen and examined in the ER  Also has had nausea, poor appetite      Past Medical History:        Diagnosis Date    Angina at rest ([facility] [address]     Cardiomyopathy ([facility] [address]     Carotid artery stenosis 10/25/2016    SUZANNA stented with 8 x 30 mm non-tapered Xact stent    CHF (congestive heart failure) ([facility] [address] 3/3/15    EF 30%     CKD (chronic kidney disease) stage 2, GFR 60-89 ml/min     Coronary artery disease     sp CABG UC    Diabetic peripheral neuropathy (HCC)     Diastolic HF (heart failure) (HCC)     Hyperlipidemia with target LDL less than 70     Hypertension goal BP (blood pressure) < 130/80     PVD (peripheral vascular disease) (HCC)     Seizures (HCC)     Type 1 diabetes mellitus with chronic kidney disease ([facility] [address]     c-peptide <0.1 in 2015       Past Surgical History:        Procedure Laterality Date    CARDIAC CATHETERIZATION      CARDIAC SURGERY      triple bypass    CAROTID STENT PLACEMENT Right     CORONARY ARTERY BYPASS GRAFT      HERNIA REPAIR      LAPAROSCOPY INSERTION PERITONEAL CATHETER N/A 6/3/2020    LAPAROSCOPIC PLACEMENT OF PERITONEAL DIALYSIS  CATHETER performed by Debora Solano MD at [facility]         Current Medications:    No current facility-administered medications on file prior to encounter. Current Outpatient Medications on File Prior to Encounter   Medication Sig Dispense Refill    NOVOLOG 100 UNIT/ML injection vial INJECT 40 TO 50 UNITS UNDER THE SKIN DAILY VIA PUMP 10 mL 3    ezetimibe (ZETIA) 10 MG tablet TAKE ONE TABLET BY MOUTH DAILY 30 tablet 3    clopidogrel (PLAVIX) 75 MG tablet Take 1 tablet by mouth daily 90 tablet 0    BASAGLAR KWIKPEN 100 UNIT/ML injection pen INJECT 20 UNITS UNDER THE SKIN ONCE NIGHTLY WHEN OFF PUMP 5 pen 3    ranolazine (RANEXA) 500 MG extended release tablet TAKE ONE TABLET BY MOUTH TWICE A  tablet 1    hydrALAZINE (APRESOLINE) 50 MG tablet Take 1 tablet by mouth every 8 hours 90 tablet 3    carvedilol (COREG) 25 MG tablet Take 2 tablets by mouth daily (with breakfast) (Patient taking differently: Take 25 mg by mouth 2 times daily ) 60 tablet 3    Continuous Blood Gluc Sensor (DEXCOM G6 SENSOR) MISC 1 Device by Does not apply route daily 1 each 0    aspirin 81 MG EC tablet Take 1 tablet by mouth daily 30 tablet 3    rosuvastatin (CRESTOR) 40 MG tablet Take 1 tablet by mouth nightly 30 tablet 3    Cholecalciferol (VITAMIN D3 ULTRA POTENCY) 1.25 MG (78084 UT) TABS Take 50,000 Units by mouth once a week (on Saturdays)      blood glucose test strips (ACCU-CHEK GUIDE) strip TEST BLOOD SUGAR 6 TIMES A  strip 5    Methylcobalamin (B-12) 1000 MCG TBDP Place 1,000 mcg under the tongue daily      isosorbide mononitrate (IMDUR) 30 MG extended release tablet Take 30 mg by mouth daily      nitroGLYCERIN (NITROSTAT) 0.4 MG SL tablet DISSOLVE 1 TAB UNDER TONGUE FOR CHEST PAIN - IF PAIN REMAINS AFTER 5 MIN, CALL 911 AND REPEAT DOSE. MAX 3 TABS IN 15 MINUTES 25 tablet 2       Allergies:   Iodides, Shellfish-derived products, and Atorvastatin calcium    Social History:    Social History     Socioeconomic History    Marital status: Legally      Spouse name: Not on file    Number of children: 1    Years of education: Not on file    Highest education level: Not on file   Occupational History    Occupation: IT   Tobacco Use    Smoking status: Never Smoker    Smokeless tobacco: Never Used   Vaping Use    Vaping Use: Never used   Substance and Sexual Activity    Alcohol use: Yes     Comment: one drink a month    Drug use: No    Sexual activity: Yes     Partners: Female   Other Topics Concern    Not on file   Social History Narrative    Not on file     Social Determinants of Health     Financial Resource Strain:     Difficulty of Paying Living Expenses: Not on file   Food Insecurity:     Worried About Running Out of Food in the Last Year: Not on file    Brittany of Food in the Last Year: Not on file   Transportation Needs:     Lack of Transportation (Medical): Not on file    Lack of Transportation (Non-Medical):  Not on file   Physical Activity:     Days of Exercise per Week: Not on file    Minutes of Exercise per Session: Not on file   Stress:     Feeling of Stress : Not on file   Social Connections:     Frequency of Communication with Friends and Family: Not on file    Frequency of Social Gatherings with Friends and Family: Not on file    Attends Cheondoism Services: Not on file    Active Member of [address] [place] [address] or Organizations: Not on file    Attends Club or Organization Meetings: Not on file    Marital Status: Not on file   Intimate Partner Violence:     Fear of Current or Ex-Partner: Not on file    Emotionally Abused: Not on file    Physically Abused: Not on file    Sexually Abused: Not on file   Housing Stability:     Unable to Pay for Housing in the Last Year: Not on file    Number of [place] in the Last Year: Not on file    Unstable Housing in the Last Year: Not on file       Family History:       Problem Relation Age of Onset    Coronary Art Dis Father     Diabetes Father     Coronary Art Dis Paternal Grandmother     Diabetes Paternal Grandmother     Hypertension Mother    Lissette Alu Other Mother Epilepsy    Other Sister         Thyroid disease  Heart murmur    Mult Sclerosis Brother     Arthritis Brother     Sleep Apnea Brother            Review of Systems   Constitutional: Positive for activity change, appetite change, chills, fatigue and fever. Negative for diaphoresis. HENT: Negative for ear discharge, ear pain, sinus pain, sneezing and sore throat. Eyes: Negative for photophobia, pain and redness. Respiratory: Positive for cough. Negative for chest tightness, shortness of breath and wheezing. Cardiovascular: Negative for chest pain, palpitations and leg swelling. Gastrointestinal: Positive for abdominal pain and nausea. Negative for blood in stool, constipation, diarrhea and vomiting. Endocrine: Negative for polydipsia, polyphagia and polyuria. Genitourinary: Negative for difficulty urinating, dysuria, hematuria and urgency. Musculoskeletal: Negative for arthralgias, back pain and myalgias. Neurological: Negative for dizziness, tremors, seizures and headaches. Psychiatric/Behavioral: Negative for confusion and hallucinations. PHYSICAL EXAM:      Vitals:    /67   Pulse 61   Temp 99.3 °F (37.4 °C) (Oral)   Resp 14   Ht 5' 8\" (1.727 m)   Wt 193 lb (87.5 kg)   SpO2 98%   BMI 29.35 kg/m²   No intake/output data recorded. No intake/output data recorded. Physical Exam:  General : AAOx3, not in pain or respiratory distress, resting in bed  HEENT : normocephalic, atraumatic, mucosa moist, no palor or icterus  CVS: S1 S2 normal, regular rhythm, no murmurs or rubs. Lungs: Clear, no wheezing or crackles. Abd: Soft, bowel sounds normal, non-tender. PD catheter+, site clean   Ext: No edema, no cyanosis  Skin: Warm. No rashes appreciated. : bladder non-distended, no tenderness over the bladder  Neuro: Alert and oriented x 3, nonfocal.  Joints: No erythema noted over joints.     DATA:    CBC with Differential:    Lab Results   Component Value Date    WBC 5.2 12/07/2021    RBC 4.93 12/07/2021    HGB 14.4 12/07/2021    HCT 44.2 12/07/2021    PLT 98 12/07/2021    MCV 89.6 12/07/2021    MCH 29.2 12/07/2021    MCHC 32.6 12/07/2021    RDW 14.4 12/07/2021    SEGSPCT 85.2 06/15/2013    LYMPHOPCT 6.0 12/07/2021    MONOPCT 4.0 12/07/2021    EOSPCT 2.1 09/28/2010    BASOPCT 0.4 12/07/2021    MONOSABS 0.2 12/07/2021    LYMPHSABS 0.3 12/07/2021    EOSABS 0.0 12/07/2021    BASOSABS 0.0 12/07/2021    DIFFTYPE Scan-K 06/15/2013     BMP:    Lab Results   Component Value Date     12/07/2021    K 4.7 12/07/2021    K 3.9 04/08/2021     12/07/2021    CO2 20 12/07/2021    BUN 35 12/07/2021    LABALBU 3.0 12/07/2021    CREATININE 5.6 12/07/2021    CALCIUM 7.7 12/07/2021    GFRAA 13 12/07/2021    GFRAA 44 06/15/2013    LABGLOM 11 12/07/2021    GLUCOSE 202 12/07/2021     Ionized Calcium:  No results found for: IONCA  Magnesium:    Lab Results   Component Value Date    MG 2.10 04/09/2021     Phosphorus:    Lab Results   Component Value Date    PHOS 4.3 04/10/2021     Last 3 Troponin:    Lab Results   Component Value Date    TROPONINI 0.17 04/08/2021    TROPONINI 0.19 04/08/2021    TROPONINI 0.04 03/04/2021     U/A:    Lab Results   Component Value Date    COLORU YELLOW 12/07/2021    PROTEINU >=300 12/07/2021    PHUR 5.5 12/07/2021    WBCUA 4 12/07/2021    RBCUA 3 12/07/2021    MUCUS 1+ 05/25/2010    BACTERIA Rare 05/25/2010    CLARITYU CLOUDY 12/07/2021    SPECGRAV 1.018 12/07/2021    LEUKOCYTESUR Negative 12/07/2021    UROBILINOGEN 0.2 12/07/2021    BILIRUBINUR Negative 12/07/2021    BILIRUBINUR NEGATIVE 05/25/2010    BLOODU TRACE 12/07/2021    GLUCOSEU 100 12/07/2021    GLUCOSEU >=1000 05/25/2010    AMORPHOUS 2+ 12/07/2021     ASSESSMENT/PLAN:      1. PD related peritonitis  Continue IV ABx  Repeat PD fluid studies, follow P D fluid cultures and blood cultures  Can change to intraperitoneal ABx based on fluid cultures.    2. ESRD on PD  CCPD tonight with Cycler for 8 hours 3 exchanges, fill volume 2300 ml.   3. Hypertension controlled  4. COVID-19      Thank you for allowing me to participate in the care of this patient. I will continue to follow along. Please call with questions.     Heather Adamson MD, MD.  Office Phone : [phone]  12/7/2021

## 2021-12-07 NOTE — ED NOTES
Bed: 30  Expected date:   Expected time:   Means of arrival:   Comments:  Rm [address] Avanue, RN  12/07/21 0642

## 2021-12-07 NOTE — CONSULTS
Infectious Diseases   Consult Note        Admission Date: 12/6/2021  Hospital Day: Hospital Day: 2   Attending: Vinicius Pop MD  Date of service: 12/7/21     Reason for admission: Peritonitis associated with peritoneal dialysis, sequela [facility]) [T85.71XS]    Chief complaint/ Reason for consult: COVID-19 pneumonia, abdominal pain, nausea and vomiting    Microbiology:        I have reviewed allavailable micro lab data and cultures    · Blood culture (2/2) - collected on 12/6/2021: in process        Antibiotics and immunizations:       Current antibiotics: All antibiotics and their doses were reviewed by me    Recent Abx Admin                   vancomycin (VANCOCIN) 1,250 mg in dextrose 5 % 250 mL IVPB (mg) 1,250 mg New Bag 12/07/21 0944    cefepime (MAXIPIME) 2000 mg IVPB minibag (mg) 2,000 mg New Bag 12/07/21 0853                  Immunization History: All immunization history was reviewed by me today. Immunization History   Administered Date(s) Administered    DT (pediatric) 01/01/1999    Influenza Vaccine, unspecified formulation 11/27/2012    Influenza Virus Vaccine 09/11/2009, 11/27/2012    Influenza, Quadv, IM, PF (6 mo and older Fluzone, Flulaval, Fluarix, and 3 yrs and older Afluria) 10/26/2016, 02/06/2018, 03/05/2021    Pneumococcal Polysaccharide (Zlrdarxsi21) 04/20/2011, 02/21/2014    Td, unspecified formulation 04/20/2011    Tdap (Boostrix, Adacel) 02/21/2014       Known drug allergies: All allergies were reviewed and updated    Allergies   Allergen Reactions    Iodides Anaphylaxis    Shellfish-Derived Products Anaphylaxis    Atorvastatin Calcium Rash       Social history:     Social History:  All social andepidemiologic history was reviewed and updated by me today as needed. · Tobacco use:   reports that he has never smoked. He has never used smokeless tobacco.  · Alcohol use:   reports current alcohol use.   · Currently lives in: [address] [place] [address]  ·  reports no history of drug use.     COVID VACCINATION AND LAB RESULT RECORDS:     Internal Administration   First Dose      Second Dose           Last COVID Lab SARS-CoV-2 (no units)   Date Value   05/29/2020 Not Detected     SARS-CoV-2, TALITA (no units)   Date Value   08/19/2020 NOT DETECTED            Assessment:     The patient is a 49 y.o. old male who  has a past medical history of Angina at rest [facility]), Cardiomyopathy ([facility] Carotid artery stenosis (10/25/2016), CHF (congestive heart failure) ([facility] (3/3/15), CKD (chronic kidney disease) stage 2, GFR 60-89 ml/min, Coronary artery disease, Diabetic peripheral neuropathy (HCC), Diastolic HF (heart failure) ([facility] Hyperlipidemia with target LDL less than 70, Hypertension goal BP (blood pressure) < 130/80, PVD (peripheral vascular disease) ([facility] Seizures ([facility] and Type 1 diabetes mellitus with chronic kidney disease ([facility] with following problems:    · High fever, secondary to COVID-19  · COVID-19 pneumonia  · Nausea and vomiting, likely secondary to COVID-19  · Abdominal pain  · Elevated procalcitonin of 0.86  · ESRD on peritoneal dialysis  · History of right lung lower lobe lobectomy  · Chronic systolic congestive heart failure with ejection fraction of 30 to 35% on 2D echo done on 3/12/2021  · Essential hypertension  · Hyperlipidemia  · Peripheral vascular disease  · Coronary artery disease  · Poorly controlled type 1 diabetes mellitus  · Overweight due to excess calorie intake : Body mass index is 29.35 kg/m². Discussion:      The patient tested positive for COVID-19 on Thursday. He has poorly controlled type type 1 diabetes mellitus. He was diagnosed with diabetes at the age of 17. The patient presented with nausea vomiting fever cough and abdominal pain. He was not able to eat much since Sunday. He had a CT scan of chest abdominal pelvis done in the ER. I reviewed the CT images. He has bilateral rectal infiltrates, classic for COVID-19.     PD fluid analysis was done earlier today. The PD fluid was reportedly hazy and had 1700 red cells and 400 white cells with 19% neutrophils which brings polymorphonuclear cell count in the PD fluid to around 76. The cell counts of the PD fluid are not consistent with bacterial peritonitis      Plan:     Diagnostic Workup:     Baseline blood counts, liver and renal function reviewed.  Follow-up on blood cultures    Nasal MRSA probe   Follow-up will be reviewed culture         Antimicrobials:     The patient is saturating 98% on room air and is on peritoneal dialysis. He does not immediately report Decadron or remdesivir at this time   He did have some elevated procalcitonin. Since he had nausea and vomiting, possibility of aspiration cannot be ruled out. However, I do not think he needs further IV vancomycin and IV Zosyn   I will order IV Unasyn at dialysis dose of 1.5 g every 12 hour for now. He can be switched to oral Augmentin at dialysis dose at discharge to complete a 1 week course of antibiotics   The patient is a little COVID 19 disease and has bilateral lung infiltrates but is saturating 98% on room air. Due to his congestive heart failure and chronic kidney disease, he falls in high risk category for COVID 19. The patient tells me that he has already received a Regeneron antibody combo yesterday at [facility].  I confirmed that in epic and he did receive it yesterday   Supportive care is still the cornerstone of treatment for COVID 19 infection.  Continue strict droplet plus isolation currently being used for COVID-19 infections.  Recommend close vitals monitoring.  Cough and deep breathing exercises   Continue high flow oxygen support to try to maintain oxygen saturation above 92%   Self prone positioning if tolerated may be helpful to achieve better oxygenation   Fall and aspiration precautions.  Maintain good glycemic control   Pressure ulcer prevention precautions   Critically ill patient.   High risk of complications.  Remains at high risk for morbidity and mortality   Discussed above plan with RN    Continue close monitoring in COVID 19 ICU unit.  I will follow the patient on a as needed basis from now on. Drug Monitoring:    · Continue serial monitoring for antiviral toxicity as follows: CBC, CMP  · Continue to watch for following: new or worsening fever, hypotension, hives, lip swelling and redness or purulence at vascular access sites. I/v access Management:    · Continue to monitor i.v access sites for erythema, induration, discharge or tenderness. · As always, continue efforts to minimizetubes/lines/drains as clinically appropriate to reduce chances of line associated infections. Current isolation precautions:    Currently active isolation(s): Droplet Plus, Droplet Plus       Level of complexity of consult: High     Risk of Complications/Morbidity: High     · Illness(es)/ Infection present that pose threat to life/bodily function. · There is potential for severe exacerbation of infection/side effects of treatment. · Therapy requires intensive monitoring for antimicrobial agent toxicity. Thank you for involving me in the care of your patient. I will continue to follow. If you have any additional questions, please do not hesitate to contact me. Subjective:     Presenting complaint in ER:     Chief Complaint   Patient presents with    Nausea     Patient tested positive for covid 19 on Thursday, having Headache, Fevers, Nausea and Vomiting. Unable to keep fluids down since Sunday morning. Febrile at triage. HPI: Lamar Padilla is a 49 y.o. male patient, who was seen at the request of Dr. Jessica Magaña MD.    History was obtained from chart review, RN and the patient    The patient was admitted on 12/6/2021. I have been consulted to see the patient for above mentioned reason(s).     The patient has multiple medical comorbidities, and presented to the ER for abdominal pain, nausea and vomiting. The patient was diagnosed with COVID-19 last week on Thursday. He presented to the ER with above symptoms. In the ER, he had a fever of 102. 2. He was admitted. Blood cultures have been sent. The patient was given IV vancomycin and cefepime in the ER    I have been asked for my opinion for management for this patient. Past Medical History: All past medical history reviewed today. Past Medical History:   Diagnosis Date    Angina at rest ([facility]     Cardiomyopathy ([facility]     Carotid artery stenosis 10/25/2016    SUZANNA stented with 8 x 30 mm non-tapered Xact stent    CHF (congestive heart failure) ([facility] 3/3/15    EF 30%     CKD (chronic kidney disease) stage 2, GFR 60-89 ml/min     Coronary artery disease     sp CABG UC    Diabetic peripheral neuropathy [facility]     Diastolic HF (heart failure) ([facility])     Hyperlipidemia with target LDL less than 70     Hypertension goal BP (blood pressure) < 130/80     PVD (peripheral vascular disease) ([facility])     Seizures ([facility])     Type 1 diabetes mellitus with chronic kidney disease ([facility]     c-peptide <0.1 in 2015         Past Surgical History: All pastsurgical history was reviewed today. Past Surgical History:   Procedure Laterality Date    CARDIAC CATHETERIZATION      CARDIAC SURGERY      triple bypass    CAROTID STENT PLACEMENT Right     CORONARY ARTERY BYPASS GRAFT      HERNIA REPAIR      LAPAROSCOPY INSERTION PERITONEAL CATHETER N/A 6/3/2020    LAPAROSCOPIC PLACEMENT OF PERITONEAL DIALYSIS  CATHETER performed by Emery Mistry MD at [facility]           Family History: All family history was reviewed today.         Problem Relation Age of Onset    Coronary Art Dis Father     Diabetes Father     Coronary Art Dis Paternal Grandmother     Diabetes Paternal Grandmother     Hypertension Mother     Other Mother         Epilepsy    Other Sister         Thyroid disease  Heart murmur    Mult Sclerosis Brother     Arthritis Brother     Sleep Apnea Brother          Medications: All current and past medications were reviewed. Not in a hospital admission.  aspirin  81 mg Oral Daily    insulin glargine  20 Units SubCUTAneous Nightly    carvedilol  25 mg Oral BID    Cholecalciferol  50,000 Units Oral Weekly    clopidogrel  75 mg Oral Daily    hydrALAZINE  50 mg Oral 3 times per day    isosorbide mononitrate  30 mg Oral Daily    ranolazine  500 mg Oral BID    rosuvastatin  40 mg Oral Nightly    sodium chloride flush  5-40 mL IntraVENous 2 times per day    [START ON 12/8/2021] cefepime  2,000 mg IntraVENous Q24H    heparin (porcine)  5,000 Units SubCUTAneous 3 times per day    insulin lispro  0-18 Units SubCUTAneous TID WC    insulin lispro  0-9 Units SubCUTAneous Nightly    dianeal lo-miller 1.5%  5,000 mL IntraPERitoneal Daily    dianeal lo-miller 1.5%  5,000 mL IntraPERitoneal Daily    gentamicin   Topical Daily          REVIEW OF SYSTEMS:      Review of Systems   Constitutional: Positive for fatigue. Negative for chills, diaphoresis and fever. HENT: Negative for ear discharge, ear pain, rhinorrhea, sore throat and trouble swallowing. Eyes: Negative for discharge and redness. Respiratory: Positive for cough. Negative for shortness of breath and wheezing. Cardiovascular: Negative for chest pain and leg swelling. Gastrointestinal: Negative for abdominal pain, constipation, diarrhea and nausea. Endocrine: Negative for polyuria. Genitourinary: Negative for dysuria, flank pain, frequency, hematuria and urgency. Musculoskeletal: Negative for back pain and myalgias. Skin: Negative for rash. Neurological: Negative for dizziness, seizures and headaches. Hematological: Does not bruise/bleed easily. Psychiatric/Behavioral: Negative for hallucinations and suicidal ideas. All other systems reviewed and are negative.          Objective:       PHYSICAL EXAM:      Vitals: Vitals:    12/07/21 1200 12/07/21 1230 12/07/21 1245 12/07/21 1300   BP:  (!) 157/61 (!) 152/116 (!) 157/60   Pulse: 72 66 69 66   Resp: 22 14 20 16   Temp:       TempSrc:       SpO2:  98% 98% 98%   Weight:       Height:           Physical Exam  Vitals and nursing note reviewed. Constitutional:       Appearance: Normal appearance. He is well-developed. Comments: The patient was seen and examined in the ER. HENT:      Head: Normocephalic and atraumatic. Right Ear: External ear normal.      Left Ear: External ear normal.      Nose: Nose normal. No congestion or rhinorrhea. Mouth/Throat:      Mouth: Mucous membranes are moist.      Pharynx: No oropharyngeal exudate or posterior oropharyngeal erythema. Eyes:      General: No scleral icterus. Right eye: No discharge. Left eye: No discharge. Conjunctiva/sclera: Conjunctivae normal.      Pupils: Pupils are equal, round, and reactive to light. Cardiovascular:      Rate and Rhythm: Normal rate and regular rhythm. Pulses: Normal pulses. Heart sounds: No murmur heard. No friction rub. Pulmonary:      Effort: Pulmonary effort is normal. No respiratory distress. Breath sounds: No stridor. Rales (Bilateral patchy crackles) present. No wheezing or rhonchi. Abdominal:      General: Bowel sounds are normal.      Palpations: Abdomen is soft. Tenderness: There is no abdominal tenderness. There is no right CVA tenderness, left CVA tenderness, guarding or rebound. Musculoskeletal:         General: No swelling or tenderness. Normal range of motion. Cervical back: Normal range of motion and neck supple. No rigidity. No muscular tenderness. Lymphadenopathy:      Cervical: No cervical adenopathy. Skin:     General: Skin is warm and dry. Coloration: Skin is not jaundiced. Findings: No erythema or rash. Neurological:      General: No focal deficit present.       Mental Status: He is alert and oriented to person, place, and time. Mental status is at baseline. Motor: No abnormal muscle tone. Psychiatric:         Mood and Affect: Mood normal.         Behavior: Behavior normal.         Thought Content: Thought content normal.           Lines: All vascular access sites are healthy with no local erythema, discharge or tenderness. Intake and output:     No intake/output data recorded. Lab Data:   All available labs were reviewed by me today. CBC:   Recent Labs     12/07/21  0040   WBC 5.2   RBC 4.93   HGB 14.4   HCT 44.2   PLT 98*   MCV 89.6   MCH 29.2   MCHC 32.6   RDW 14.4        BMP:  Recent Labs     12/07/21  0040   *   K 4.7      CO2 20*   BUN 35*   CREATININE 5.6*   CALCIUM 7.7*   GLUCOSE 202*        Hepatic FunctionPanel:   Lab Results   Component Value Date    ALKPHOS 92 12/07/2021    ALT 18 12/07/2021    AST 30 12/07/2021    PROT 6.5 12/07/2021    PROT 7.9 09/28/2010    BILITOT <0.2 12/07/2021    BILIDIR <0.2 05/06/2018    IBILI see below 05/06/2018    LABALBU 3.0 12/07/2021       CPK:   Lab Results   Component Value Date    CKTOTAL 143 05/01/2020     ESR: No results found for: SEDRATE  CRP: No results found for: CRP      Imaging: All pertinent images and reports for the current visit were reviewed by meduring this visit. CT CHEST ABDOMEN PELVIS WO CONTRAST   Final Result   1. Multifocal ground-glass opacities in the bilateral lungs, suspected to   represent COVID-19 pneumonia. 2. No acute finding in the abdomen or pelvis. 3. Prior right lower lobectomy with chronic mild pleural fluid. 4. Peritoneal dialysis catheter with atrophy of native kidneys. Outside records:    Labs, Microbiology, Radiology and pertinent results from Care everywhere, if available, were reviewed as a part ofthe consultation.       Problem list:       Patient Active Problem List   Diagnosis Code    DKA (diabetic ketoacidoses) E11.10    Coronary artery disease due to lipid rich plaque I25.10, I25.83    DM type 1 (diabetes mellitus, type 1) ([facility]) E10.9    Hyperlipidemia E78.5    Acute kidney injury superimposed on chronic kidney disease ([facility]) N17.9, N18.9    PNA (pneumonia) J18.9    CRI (chronic renal insufficiency) ([facility]) N18.9    CHF (congestive heart failure) ([facility]) I50.9    Hypoglycemia E56.4    Diastolic HF (heart failure) ([facility]) I50.30    Dyslipidemia E78.5    CKD (chronic kidney disease) stage 2, GFR 60-89 ml/min N18.2    Type 1 diabetes mellitus with chronic kidney disease ([facility]) E10.22    Diabetic peripheral neuropathy ([facility]) E11.42    Carotid artery stenosis I65.29    PVD (peripheral vascular disease) ([facility]) I73.9    Diabetic nephropathy associated with type 1 diabetes mellitus ([facility]) E10.21    Chest pain R07.9    Stenosis of right carotid artery I65.21    Carotid artery calcification I65.29    H/O carotid angioplasty Z98.62    Pure hypercholesterolemia E78.00    Abnormal cardiovascular stress test R94.39    Acute encephalopathy G93.40    Generalized idiopathic epilepsy, not intractable, without status epilepticus ([facility] G40.309    Encephalopathy G93.40    NSTEMI (non-ST elevated myocardial infarction) ([facility] I21.4    Essential hypertension I10    S/P CABG (coronary artery bypass graft) Z95.1    Ischemic cardiomyopathy I25.5    Venous insufficiency of both lower extremities I87.2    Atrial fibrillation with rapid ventricular response ([facility]) I48.91    Syncope R55    Stage 4 chronic kidney disease ([facility]) N18.4    NSVT (nonsustained ventricular tachycardia) ([facility]) I47.2    DKA, type 1, not at goal [facility]) E10.10    Acute on chronic combined systolic and diastolic CHF (congestive heart failure) ([facility]) I50.43    Left leg weakness R29.898    Arterial ischemic stroke, ICA, right, acute ([facility]) I63.231    DM (diabetes mellitus), type 2, uncontrolled with complications ([facility]) F77.3, E11.65    End-stage renal disease on peritoneal dialysis ([facility]) N18.6, Z99.2    Hypersomnia G47.10    Moderate obstructive sleep apnea G47.33    Snoring R06.83    Unstable angina (HCC) I20.0    ESRD on dialysis (HCC) N18.6, Z99.2    Fever due to COVID-19 U07.1, R50.81    Peritonitis associated with peritoneal dialysis (HCC) T85.71XA    Sepsis (HCC) A41.9    Dialysis-associated peritonitis (HCC) T85.71XA    Nausea and vomiting R11.2    Elevated procalcitonin R79.89    Peritoneal dialysis catheter in place [facility]) Z99.2    Abdominal pain R10.9    Poorly controlled type 1 diabetes mellitus (HCC) E10.65    Overweight (BMI 25.0-29. 9) E66.3         Please note that this chart was generated using Dragon dictation software. Although every effort was made to ensure the accuracy of this automated transcription, some errors in transcription may have occurred inadvertently. If you may need any clarification, please do not hesitate to contact me through EPIC or at the phone number provided below with my electronic signature. Any pictures or media included in this note were obtained after taking informed verbal consent from the patient and with their approval to include those in the patient's medical record.       Shelley Sheriff MD, MPH, FACP, FIDSA  12/7/2021, 1:14 PM  [facility]   [address] [place] [address], [address] [place] [address]  Office: [phone]  Fax: [phone]  Clinic days:  Tuesday & Thursday

## 2021-12-08 VITALS
WEIGHT: 191.8 LBS | RESPIRATION RATE: 14 BRPM | HEIGHT: 68 IN | SYSTOLIC BLOOD PRESSURE: 143 MMHG | BODY MASS INDEX: 29.07 KG/M2 | HEART RATE: 69 BPM | OXYGEN SATURATION: 100 % | TEMPERATURE: 97.6 F | DIASTOLIC BLOOD PRESSURE: 70 MMHG

## 2021-12-08 LAB
A/G RATIO: 0.8 (ref 1.1–2.2)
ALBUMIN SERPL-MCNC: 2.6 G/DL (ref 3.4–5)
ALP BLD-CCNC: 85 U/L (ref 40–129)
ALT SERPL-CCNC: 27 U/L (ref 10–40)
ANION GAP SERPL CALCULATED.3IONS-SCNC: 14 MMOL/L (ref 3–16)
APPEARANCE FLUID: CLEAR
APTT: 30.7 SEC (ref 26.2–38.6)
AST SERPL-CCNC: 42 U/L (ref 15–37)
BASOPHILS ABSOLUTE: 0 K/UL (ref 0–0.2)
BASOPHILS RELATIVE PERCENT: 0.2 %
BILIRUB SERPL-MCNC: <0.2 MG/DL (ref 0–1)
BUN BLDV-MCNC: 39 MG/DL (ref 7–20)
C-REACTIVE PROTEIN: 63.8 MG/L (ref 0–5.1)
CALCIUM SERPL-MCNC: 7.6 MG/DL (ref 8.3–10.6)
CELL COUNT FLUID TYPE: NORMAL
CHLORIDE BLD-SCNC: 104 MMOL/L (ref 99–110)
CLOT EVALUATION: NORMAL
CO2: 18 MMOL/L (ref 21–32)
COLOR FLUID: COLORLESS
CREAT SERPL-MCNC: 5.1 MG/DL (ref 0.9–1.3)
D DIMER: 331 NG/ML DDU (ref 0–229)
EOSINOPHILS ABSOLUTE: 0.1 K/UL (ref 0–0.6)
EOSINOPHILS RELATIVE PERCENT: 2.3 %
ESTIMATED AVERAGE GLUCOSE: 214.5 MG/DL
FERRITIN: 179.2 NG/ML (ref 30–400)
FIBRINOGEN: 457 MG/DL (ref 200–397)
FLUID DIFF COMMENT: NORMAL
GFR AFRICAN AMERICAN: 14
GFR NON-AFRICAN AMERICAN: 12
GLUCOSE BLD-MCNC: 130 MG/DL (ref 70–99)
GLUCOSE BLD-MCNC: 160 MG/DL (ref 70–99)
GLUCOSE BLD-MCNC: 256 MG/DL (ref 70–99)
GLUCOSE BLD-MCNC: 304 MG/DL (ref 70–99)
HBA1C MFR BLD: 9.1 %
HCT VFR BLD CALC: 42.3 % (ref 40.5–52.5)
HEMOGLOBIN: 13.7 G/DL (ref 13.5–17.5)
INR BLD: 0.97 (ref 0.88–1.12)
LACTIC ACID: 0.8 MMOL/L (ref 0.4–2)
LYMPHOCYTES ABSOLUTE: 0.8 K/UL (ref 1–5.1)
LYMPHOCYTES RELATIVE PERCENT: 36.6 %
MCH RBC QN AUTO: 29.2 PG (ref 26–34)
MCHC RBC AUTO-ENTMCNC: 32.4 G/DL (ref 31–36)
MCV RBC AUTO: 90.3 FL (ref 80–100)
MONOCYTES ABSOLUTE: 0.3 K/UL (ref 0–1.3)
MONOCYTES RELATIVE PERCENT: 14.6 %
NEUTROPHILS ABSOLUTE: 1 K/UL (ref 1.7–7.7)
NEUTROPHILS RELATIVE PERCENT: 46.3 %
NUCLEATED CELLS FLUID: 4 /CUMM
PDW BLD-RTO: 14.7 % (ref 12.4–15.4)
PERFORMED ON: ABNORMAL
PLATELET # BLD: 101 K/UL (ref 135–450)
PMV BLD AUTO: 10.6 FL (ref 5–10.5)
POTASSIUM REFLEX MAGNESIUM: 3.9 MMOL/L (ref 3.5–5.1)
PROTHROMBIN TIME: 11 SEC (ref 9.9–12.7)
RBC # BLD: 4.68 M/UL (ref 4.2–5.9)
RBC FLUID: 6 /CUMM
SARS-COV-2: DETECTED
SODIUM BLD-SCNC: 136 MMOL/L (ref 136–145)
TOTAL PROTEIN: 5.8 G/DL (ref 6.4–8.2)
TROPONIN: 0.05 NG/ML
VITAMIN D 25-HYDROXY: 18.4 NG/ML
WBC # BLD: 2.2 K/UL (ref 4–11)

## 2021-12-08 PROCEDURE — 6370000000 HC RX 637 (ALT 250 FOR IP): Performed by: NURSE PRACTITIONER

## 2021-12-08 PROCEDURE — 36415 COLL VENOUS BLD VENIPUNCTURE: CPT

## 2021-12-08 PROCEDURE — 85025 COMPLETE CBC W/AUTO DIFF WBC: CPT

## 2021-12-08 PROCEDURE — 85730 THROMBOPLASTIN TIME PARTIAL: CPT

## 2021-12-08 PROCEDURE — 6370000000 HC RX 637 (ALT 250 FOR IP): Performed by: INTERNAL MEDICINE

## 2021-12-08 PROCEDURE — 85610 PROTHROMBIN TIME: CPT

## 2021-12-08 PROCEDURE — 97535 SELF CARE MNGMENT TRAINING: CPT

## 2021-12-08 PROCEDURE — 85379 FIBRIN DEGRADATION QUANT: CPT

## 2021-12-08 PROCEDURE — 6360000002 HC RX W HCPCS: Performed by: INTERNAL MEDICINE

## 2021-12-08 PROCEDURE — 2580000003 HC RX 258: Performed by: INTERNAL MEDICINE

## 2021-12-08 PROCEDURE — 80053 COMPREHEN METABOLIC PANEL: CPT

## 2021-12-08 PROCEDURE — 87070 CULTURE OTHR SPECIMN AEROBIC: CPT

## 2021-12-08 PROCEDURE — 85384 FIBRINOGEN ACTIVITY: CPT

## 2021-12-08 PROCEDURE — 97530 THERAPEUTIC ACTIVITIES: CPT

## 2021-12-08 PROCEDURE — 97162 PT EVAL MOD COMPLEX 30 MIN: CPT

## 2021-12-08 PROCEDURE — 97116 GAIT TRAINING THERAPY: CPT

## 2021-12-08 PROCEDURE — G0378 HOSPITAL OBSERVATION PER HR: HCPCS

## 2021-12-08 PROCEDURE — 96366 THER/PROPH/DIAG IV INF ADDON: CPT

## 2021-12-08 PROCEDURE — 83605 ASSAY OF LACTIC ACID: CPT

## 2021-12-08 PROCEDURE — 82306 VITAMIN D 25 HYDROXY: CPT

## 2021-12-08 PROCEDURE — 87205 SMEAR GRAM STAIN: CPT

## 2021-12-08 PROCEDURE — 84484 ASSAY OF TROPONIN QUANT: CPT

## 2021-12-08 PROCEDURE — 89050 BODY FLUID CELL COUNT: CPT

## 2021-12-08 PROCEDURE — 97166 OT EVAL MOD COMPLEX 45 MIN: CPT

## 2021-12-08 PROCEDURE — 86140 C-REACTIVE PROTEIN: CPT

## 2021-12-08 PROCEDURE — 96372 THER/PROPH/DIAG INJ SC/IM: CPT

## 2021-12-08 RX ORDER — AMOXICILLIN AND CLAVULANATE POTASSIUM 500; 125 MG/1; MG/1
1 TABLET, FILM COATED ORAL 2 TIMES DAILY
Qty: 14 TABLET | Refills: 0 | Status: SHIPPED | OUTPATIENT
Start: 2021-12-08 | End: 2021-12-15

## 2021-12-08 RX ORDER — GUAIFENESIN/DEXTROMETHORPHAN 100-10MG/5
5 SYRUP ORAL EVERY 4 HOURS PRN
Qty: 120 ML | Refills: 0 | Status: SHIPPED | OUTPATIENT
Start: 2021-12-08 | End: 2022-02-24

## 2021-12-08 RX ORDER — FLUCONAZOLE 100 MG/1
100 TABLET ORAL DAILY
Qty: 7 TABLET | Refills: 0 | Status: SHIPPED | OUTPATIENT
Start: 2021-12-08 | End: 2021-12-15

## 2021-12-08 RX ORDER — DEXAMETHASONE 6 MG/1
6 TABLET ORAL
Qty: 10 TABLET | Refills: 0 | Status: SHIPPED | OUTPATIENT
Start: 2021-12-08 | End: 2021-12-18

## 2021-12-08 RX ADMIN — GENTAMICIN SULFATE: 1 CREAM TOPICAL at 11:44

## 2021-12-08 RX ADMIN — CLOPIDOGREL BISULFATE 75 MG: 75 TABLET ORAL at 08:27

## 2021-12-08 RX ADMIN — SODIUM CHLORIDE 1500 MG: 900 INJECTION INTRAVENOUS at 03:30

## 2021-12-08 RX ADMIN — SODIUM CHLORIDE 1500 MG: 900 INJECTION INTRAVENOUS at 13:29

## 2021-12-08 RX ADMIN — ASPIRIN 81 MG: 81 TABLET, COATED ORAL at 08:27

## 2021-12-08 RX ADMIN — HYDRALAZINE HYDROCHLORIDE 50 MG: 25 TABLET, FILM COATED ORAL at 13:21

## 2021-12-08 RX ADMIN — ISOSORBIDE MONONITRATE 30 MG: 30 TABLET, EXTENDED RELEASE ORAL at 08:27

## 2021-12-08 RX ADMIN — ACETAMINOPHEN 650 MG: 325 TABLET ORAL at 13:20

## 2021-12-08 RX ADMIN — RANOLAZINE 500 MG: 500 TABLET, EXTENDED RELEASE ORAL at 08:27

## 2021-12-08 RX ADMIN — Medication 10 ML: at 08:38

## 2021-12-08 RX ADMIN — HEPARIN SODIUM 5000 UNITS: 5000 INJECTION INTRAVENOUS; SUBCUTANEOUS at 06:22

## 2021-12-08 RX ADMIN — HEPARIN SODIUM 5000 UNITS: 5000 INJECTION INTRAVENOUS; SUBCUTANEOUS at 13:21

## 2021-12-08 RX ADMIN — GUAIFENESIN AND DEXTROMETHORPHAN 5 ML: 100; 10 SYRUP ORAL at 08:43

## 2021-12-08 RX ADMIN — SODIUM CHLORIDE: 9 INJECTION, SOLUTION INTRAVENOUS at 11:49

## 2021-12-08 RX ADMIN — CARVEDILOL 25 MG: 25 TABLET, FILM COATED ORAL at 08:27

## 2021-12-08 RX ADMIN — HYDRALAZINE HYDROCHLORIDE 50 MG: 25 TABLET, FILM COATED ORAL at 06:22

## 2021-12-08 ASSESSMENT — PAIN SCALES - GENERAL
PAINLEVEL_OUTOF10: 7
PAINLEVEL_OUTOF10: 0

## 2021-12-08 NOTE — PROGRESS NOTES
CLINICAL PHARMACY NOTE: MEDS TO BEDS    Total # of Prescriptions Filled: 3   The following medications were delivered to the patient:  · Augmentin 500-125  · Fluconazole 100 mg  · Dexamethasone 6 mg    Additional Documentation:  Delivered to Nurse Anabell Anaya RN=signed  Alejandrina Cervantes CPhT
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
## 2021-12-08 NOTE — PROGRESS NOTES
[facility] Diabetes Education Nurse  Consult Note       NAME:  Toribio Valera  MEDICAL RECORD NUMBER:  [id number]  AGE: 49 y.o. GENDER: male  : 1965  TODAY'S DATE:  2021    Subjective:     Reason for Educator consult ---  Evaluation and Assessment:    Toribio Valera is a 49 y.o. male referred by:   [] Physician  [x] Nursing  [] Other:      Pt seen for DM education. Pt currently using an insulin pump; FSBS in acceptable range. Pt sees Dr. Dalton Glover as outpatient. Discussed use and side effects of dexamethasone which pt is prescribed for the next 10 days. Instructed pt to call endocrinology office if unable to control BG. Pt states Dr. Dalton Glover has mentioned that he may have diabetic gastroparesis. Pt states he experiences fullness sometimes after only a couple of bites, and has vomited up food that he had eaten a long time before vomiting. Encouraged follow up with PCP for possible tests/treatments. Pt states he has switched to post-meal boluses to accurately account for carb intake.       PAST MEDICAL HISTORY      Diagnosis Date    [facility] rest [facility])     Cardiomyopathy ([facility]     Carotid artery stenosis 10/25/2016    SUZANNA stented with 8 x 30 mm non-tapered Xact stent    CHF (congestive heart failure) ([facility] 3/3/15    EF 30%     CKD (chronic kidney disease) stage 2, GFR 60-89 ml/min     Coronary artery disease     sp CABG UC    Diabetic peripheral neuropathy (HCC)     Di[facility] HF [facility]heart failure) [facility]     Hyperlipidemia with target LDL less than 70     Hypertension goal BP (blood pressure) < 130/80     PVD (peripheral vascular disease) ([facility]     Seizures ([facility])     Type 1 diabetes mellitus with chronic kidney disease ([facility])     c-peptide <0.1 in        PAST SURGICAL HISTORY  Past Surgical History:   Procedure Laterality Date    CARDIAC CATHETERIZATION      CARDIAC SURGERY      triple bypass    CAROTID STENT PLACEMENT Right     CORONARY ARTERY BYPASS GRAFT      HERNIA REPAIR      not apply route daily 1 each 0    aspirin 81 MG EC tablet Take 1 tablet by mouth daily 30 tablet 3    rosuvastatin (CRESTOR) 40 MG tablet Take 1 tablet by mouth nightly 30 tablet 3    Cholecalciferol (VITAMIN D3 ULTRA POTENCY) 1.25 MG (91321 UT) TABS Take 50,000 Units by mouth once a week (on Saturdays)      blood glucose test strips (ACCU-CHEK GUIDE) strip TEST BLOOD SUGAR 6 TIMES A  strip 5    Methylcobalamin (B-12) 1000 MCG TBDP Place 1,000 mcg under the tongue daily      isosorbide mononitrate (IMDUR) 30 MG extended release tablet Take 30 mg by mouth daily      nitroGLYCERIN (NITROSTAT) 0.4 MG SL tablet DISSOLVE 1 TAB UNDER TONGUE FOR CHEST PAIN - IF PAIN REMAINS AFTER 5 MIN, CALL 911 AND REPEAT DOSE.  MAX 3 TABS IN 15 MINUTES 25 tablet 2       Objective:     LABS    CBC:   Lab Results   Component Value Date    WBC 2.2 12/08/2021    RBC 4.68 12/08/2021    HGB 13.7 12/08/2021    HCT 42.3 12/08/2021    MCV 90.3 12/08/2021    MCH 29.2 12/08/2021    MCHC 32.4 12/08/2021    RDW 14.7 12/08/2021     12/08/2021    MPV 10.6 12/08/2021     CMP:    Lab Results   Component Value Date     12/08/2021    K 3.9 12/08/2021     12/08/2021    CO2 18 12/08/2021    BUN 39 12/08/2021    CREATININE 5.1 12/08/2021    GFRAA 14 12/08/2021    GFRAA 44 06/15/2013    AGRATIO 0.8 12/08/2021    LABGLOM 12 12/08/2021    GLUCOSE 304 12/08/2021    PROT 5.8 12/08/2021    PROT 7.9 09/28/2010    LABALBU 2.6 12/08/2021    CALCIUM 7.6 12/08/2021    BILITOT <0.2 12/08/2021    ALKPHOS 85 12/08/2021    AST 42 12/08/2021    ALT 27 12/08/2021       HgBA1c:    Lab Results   Component Value Date    LABA1C 9.1 12/07/2021     This patient's last creatinine was   Recent Labs     12/08/21  0519   CREATININE 5.1*        Recent Blood sugars have been   Lab Results   Component Value Date    POCGLU 160 12/08/2021    POCGLU 130 12/08/2021    POCGLU 256 12/08/2021    POCGLU 302 12/07/2021    POCGLU 346 04/10/2021    POCGLU 149 03/05/2021    POCGLU 225 03/05/2021    POCGLU 287 03/05/2021     Lab Results   Component Value Date    GLUCOSE 304 12/08/2021    GLUCOSE 202 12/07/2021    GLUCOSE 417 04/10/2021    GLUCOSE 167 04/09/2021    GLUCOSE 167 04/08/2021    GLUCOSE 439 03/04/2021            Assessment:     Patient Active Problem List   Diagnosis    DKA (diabetic ketoacidoses)    Coronary artery disease due to lipid rich plaque    DM type 1 (diabetes mellitus, type 1) ([facility]    Hyperlipidemia    Acute kidney injury superimposed on chronic kidney disease ([facility])    PNA (pneumonia)    CRI (chronic renal insufficiency) ([facility])    CHF (congestive heart failure) ([facility])    Hypoglycemia    Diastolic HF (heart failure) ([facility])    Dyslipidemia    CKD (chronic kidney disease) stage 2, GFR 60-89 ml/min    Type 1 diabetes mellitus with chronic kidney disease ([facility])    Diabetic peripheral neuropathy ([facility])    Carotid artery stenosis    PVD (peripheral vascular disease) ([address]    Diabetic nephropathy associated with type 1 diabetes mellitus ([address]    Chest pain    Stenosis of right carotid artery    Carotid artery calcification    H/O carotid angioplasty    Pure hypercholesterolemia    Abnormal cardiovascular stress test    Acute encephalopathy    Generalized idiopathic epilepsy, not intractable, without status epilepticus ([address]    Encephalopathy    NSTEMI (non-ST elevated myocardial infarction) ([address]    Essential hypertension    S/P CABG (coronary artery bypass graft)    Ischemic cardiomyopathy    Venous insufficiency of both lower extremities    Atrial fibrillation with rapid ventricular response ([facility])    Syncope    Stage 4 chronic kidney disease ([facility])    NSVT (nonsustained ventricular tachycardia) ([facility])    DKA, type 1, not at goal [facility])    Acute on chronic combined systolic and diastolic CHF (congestive heart failure) ([facility])    Left leg weakness    Arterial ischemic stroke, ICA, right, acute ([address]    DM (diabetes mellitus), type 2, uncontrolled with complications ([facility]    End-stage renal disease on peritoneal dialysis ([facility]    Hypersomnia    Moderate obstructive sleep apnea    Snoring    Unstable angina (HCC)    ESRD on dialysis ([facility]    Fever due to COVID-19    Peritonitis associated with peritoneal dialysis ([facility]    Sepsis ([facility]    Dialysis-associated peritonitis (HCC)    Nausea and vomiting    Elevated procalcitonin    Peritoneal dialysis catheter in place [facility])    Abdominal pain    Poorly controlled type 1 diabetes mellitus ([facility]    Overweight (BMI 25.0-29. 9)       Plan:     Plan of Care:   Continue to monitor blood sugars as ordered  Pt currently has discharge order    Discharge Plan:  Patient will continue insulin pump therapy  Pt advised to call endo office if unable to control BG while on steroid therapy    Rodolfo CRAIG, RN, [facility]  Certified Diabetes Care and
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
## 2021-12-08 NOTE — PROGRESS NOTES
Gathered pts belongings, removed IV and went over discharge paperwork with pt. Pt wheeled down to lobby to be picked up by sister. Pt going home with home health care and rolling walker.

## 2021-12-08 NOTE — DISCHARGE INSTR - COC
Continuity of Care Form    Patient Name: John Randle   :  1965  MRN:  [id number]    Admit date:  2021  Discharge date:  2021    Code Status Order: Full Code   Advance Directives:      Admitting Physician:  Rowena Rucker MD  PCP: Sandra Nolen MD    Discharging Nurse: [id number] Unit/Room#: E3P-9432/0496-46  Discharging Unit Phone Number: [phone]    Emergency Contact:   Extended Emergency Contact Information  Primary Emergency Contact: [address] [place] [address] Phone: [phone]  Relation: Child  Secondary Emergency Contact: [address] Phone: [phone]  Mobile Phone: [phone]  Relation: Brother/Sister    Past Surgical History:  Past Surgical History:   Procedure Laterality Date    CARDIAC CATHETERIZATION      CARDIAC SURGERY      triple bypass    CAROTID STENT PLACEMENT Right     CORONARY ARTERY BYPASS GRAFT      HERNIA REPAIR      LAPAROSCOPY INSERTION PERITONEAL CATHETER N/A 6/3/2020    LAPAROSCOPIC PLACEMENT OF PERITONEAL DIALYSIS  CATHETER performed by Beto Ortega MD at [address] [place] [address]         Immunization History:   Immunization History   Administered Date(s) Administered    DT (pediatric) 1999    Influenza Vaccine, unspecified formulation 2012    Influenza Virus Vaccine 2009, 2012    Influenza, Gamaliel Forget, IM, PF (6 mo and older Fluzone, Flulaval, Fluarix, and 3 yrs and older Afluria) 10/26/2016, 2018, 2021    Pneumococcal Polysaccharide ([id number]) 2011, 2014    Td, unspecified formulation 2011    Tdap (Boostrix, Adacel) 2014       Active Problems:  Patient Active Problem List   Diagnosis Code    DKA (diabetic ketoacidoses) E11.10    Coronary artery disease due to lipid rich plaque I25.10, I25.83    DM type 1 (diabetes mellitus, type 1) (HCC) E10.9    Hyperlipidemia E78.5    Acute kidney injury superimposed on chronic kidney disease (HCC) N17.9, N18.9    PNA (pneumonia) J18.9    CRI (chronic renal insufficiency) ([facility]) N18.9    CHF (congestive heart failure) ([facility]) I50.9    Hypoglycemia I97.3    Diastolic HF (heart failure) ([facility]) I50.30    Dyslipidemia E78.5    CKD (chronic kidney disease) stage 2, GFR 60-89 ml/min N18.2    Type 1 diabetes mellitus with chronic kidney disease ([facility]) E10.22    Diabetic peripheral neuropathy ([facility]) E11.42    Carotid artery stenosis I65.29    PVD (peripheral vascular disease) ([facility]) I73.9    Diabetic nephropathy associated with type 1 diabetes mellitus ([facility]) E10.21    Chest pain R07.9    Stenosis of right carotid artery I65.21    Carotid artery calcification I65.29    H/O carotid angioplasty Z98.62    Pure hypercholesterolemia E78.00    Abnormal cardiovascular stress test R94.39    Acute encephalopathy G93.40    Generalized idiopathic epilepsy, not intractable, without status epilepticus ([facility] G40.309    Encephalopathy G93.40    NSTEMI (non-ST elevated myocardial infarction) ([facility] I21.4    Essential hypertension I10    S/P CABG (coronary artery bypass graft) Z95.1    Ischemic cardiomyopathy I25.5    Venous insufficiency of both lower extremities I87.2    Atrial fibrillation with rapid ventricular response ([facility]) I48.91    Syncope R55    Stage 4 chronic kidney disease ([facility]) N18.4    NSVT (nonsustained ventricular tachycardia) ([facility]) I47.2    DKA, type 1, not at goal [facility]) E10.10    Acute on chronic combined systolic and diastolic CHF (congestive heart failure) ([facility]) I50.43    Left leg weakness R29.898    Arterial ischemic stroke, ICA, right, acute ([facility]) I63.231    DM (diabetes mellitus), type 2, uncontrolled with complications ([facility]) K94.5, E11.65    End-stage renal disease on peritoneal dialysis ([facility]) N18.6, Z99.2    Hypersomnia G47.10    Moderate obstructive sleep apnea G47.33    Snoring R06.83    Unstable angina ([facility]) I20.0    ESRD on dialysis ([facility]) N18.6, Z99.2    Fever due to COVID-19 U07.1, R50.81    Peritonitis associated with peritoneal dialysis ([facility] T85.71XA    Sepsis ([facility] A41.9    Dialysis-associated peritonitis ([facility] T85.71XA    Nausea and vomiting R11.2    Elevated procalcitonin R79.89    Peritoneal dialysis catheter in place [facility]) Z99.2    Abdominal pain R10.9    Poorly controlled type 1 diabetes mellitus ([facility] E10.65    Overweight (BMI 25.0-29. 9) E66.3       Isolation/Infection:   Isolation            Droplet Plus  Droplet Plus          Patient Infection Status       Infection Onset Added Last Indicated Last Indicated By Review Planned Expiration Resolved Resolved By    COVID-19 (Rule Out) 12/07/21 12/07/21 12/07/21 COVID-19 (Ordered) 12/14/21 12/21/21      Resolved    COVID-19 (Rule Out) 05/29/20 05/29/20 05/29/20 COVID-19 Ambulatory (Ordered)   05/31/20 Rule-Out Test Resulted            Nurse Assessment:  Last Vital Signs: BP (!) 151/87   Pulse 82   Temp 98 °F (36.7 °C) (Temporal)   Resp 15   Ht 5' 8\" (1.727 m)   Wt 191 lb 12.8 oz (87 kg)   SpO2 100%   BMI 29.16 kg/m²     Last documented pain score (0-10 scale): Pain Level: 0  Last Weight:   Wt Readings from Last 1 Encounters:   12/08/21 191 lb 12.8 oz (87 kg)     Mental Status:  oriented and alert    IV Access:  - None    Nursing Mobility/ADLs:  Walking   Independent  Transfer  Independent  Bathing  Independent  Dressing  Independent  [address]  Independent  Med Delivery   whole    Wound Care Documentation and Therapy:        Elimination:  Continence: Bowel: Yes  Bladder: Yes  Urinary Catheter: None   Colostomy/Ileostomy/Ileal Conduit: No       Date of Last BM: 12/6/2021    Intake/Output Summary (Last 24 hours) at 12/8/2021 1125  Last data filed at 12/8/2021 0851  Gross per 24 hour   Intake 46.41 ml   Output 1023 ml   Net -976.59 ml     I/O last 3 completed shifts: In: 46.4 [IV Piggyback:46.4]  Out: 308 [Urine:300]    Safety Concerns:      At Risk for Falls    Impairments/Disabilities:      None    Nutrition Therapy:  Current Nutrition Therapy:   - Oral Diet:  General    Routes of Feeding: Oral  Liquids: No Restrictions  Daily Fluid Restriction: no  Last Modified Barium Swallow with Video (Video Swallowing Test): not done    Treatments at the Time of Hospital Discharge:   Respiratory Treatments: n/a  Oxygen Therapy:  is not on home oxygen therapy. Ventilator:    - No ventilator support    Rehab Therapies: Physical Therapy and Occupational Therapy  Weight Bearing Status/Restrictions: No weight bearing restirctions  Other Medical Equipment (for information only, NOT a DME order):  walker  Other Treatments: n/a    Patient's personal belongings (please select all that are sent with patient):  Glasses, phone, phone , clothes    RN SIGNATURE:  Electronically signed by Jonna Smith RN on 12/8/21 at 5:09 PM EST    CASE MANAGEMENT/SOCIAL WORK SECTION    Inpatient Status Date: 12/7/2021    Readmission Risk Assessment Score:  Readmission Risk              Risk of Unplanned Readmission:  18           Discharging to Facility/ Agency   Name: Name:  [address] [place] [address]  Phone:  [phone]       Fax: [phone]     Dialysis Facility (if applicable)   Name: [facility]  Address:  Dialysis Schedule:  Phone:  Fax:    / signature: IRMA Short, CCM, RN  [facility]  [phone]     PHYSICIAN SECTION    Prognosis: Good    Condition at Discharge: Stable    Rehab Potential (if transferring to Rehab): Good    Recommended Labs or Other Treatments After Discharge:     Physician Certification: I certify the above information and transfer of Edwin Ochoa  is necessary for the continuing treatment of the diagnosis listed and that he requires Home Care for less 30 days.      Update Admission H&P: No change in H&P    PHYSICIAN SIGNATURE:  Electronically signed by Dean Markham MD on 12/8/21 at 12:52 PM EST

## 2021-12-08 NOTE — PROGRESS NOTES
Treatment initiated without complications or complaints from patient. Patient resting comfortably with VSS upon dialysis RN exit from room. George Soni RN notified of start of treatment and hotline number located on top of machine for any questions about troubleshooting during after hours.

## 2021-12-08 NOTE — PROGRESS NOTES
Assessment completed and documented. VSS. /92. meds given per [facility]. Pt , correction of 2.37 units given via insulin pump. Pt denies pain or needs. Call light within reach, will continue to monitor.

## 2021-12-08 NOTE — CARE COORDINATION
Referral called to [address] [place] [address] Dr Gautam per patient request.    YUNG AnnN, CCM, RN  [facility]  [phone]

## 2021-12-08 NOTE — PROGRESS NOTES
Re-assessment complete. No changes at this time. Urinal at bedside. Patient resting between care. Call light within reach.  Bed alarm engaged
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
## 2021-12-08 NOTE — PLAN OF CARE
Problem: Falls - Risk of:  Goal: Will remain free from falls  Description: Will remain free from falls  12/8/2021 1102 by Malik Lozada RN  Outcome: Ongoing  12/8/2021 0436 by Sweetie Herrmann RN  Outcome: Ongoing

## 2021-12-08 NOTE — FLOWSHEET NOTE
Disconnected from [facility] per protocol. Effluent: clear yellow, no fibrin noted. Effluent sent to lab per orders. Total time: 11 hr 56 min   Total UF:  240 ml. Total Volume:  7497 ml. Dwell time gained:  0 hr 9 min. Pt Tolerated procedure without difficulty.    Report given to: Minh Barajas RN  Last BM: 12/7/21 12/08/21 0851   Vitals   BP (!) 151/87   Temp 98 °F (36.7 °C)   Temp Source Temporal   Pulse 82   Resp 15   SpO2 100 %   Weight 191 lb 12.8 oz (87 kg)   Cycler   Ultrafiltration (UF) (mL) 240 mL

## 2021-12-08 NOTE — PROGRESS NOTES
Shift assessment complete. VSS. Patient alert and oriented. Patient remains on room air at this time with oxygen saturation of 99%. Reviewed medications with patient. Patient refuses to take insulin while in the hospital for treatment. He states that he has been managing his pump for many years and would just like to control it on his own on the implanted insulin pump. Pt states his body is very sensitive to insulin adjustments and would like to speak to physician in the morning about discontinuing scheduled insulin while here. Glucose checked on hospital monitor. Scheduled nightly insulin held at this time. Patient states no other needs at this time. Bed alarm engaged.  Call light within reach

## 2021-12-09 ENCOUNTER — CARE COORDINATION (OUTPATIENT)
Dept: CASE MANAGEMENT | Age: 56
End: 2021-12-09

## 2021-12-09 NOTE — CARE COORDINATION
[facility] Transitions Initial Follow Up Call:    (Patient does not have PCP, has not been established with Dr. William Echeverria.  He will call Carlos Arvizu and schedule follow up.)    Patient reports that he is \"off balance\" at times and that he feels weak. He has slight cough that is very much improved, denies SOB and is afebrile. Discussed discharge instructions and reviewed medications, 1111F completed. He has all of his medications and is taking them as prescribed. CTN contacted \"Shaggy\" with [facility] and confirmed that orders for home care were received. Call within 2 business days of discharge: Yes    Patient: Marino Adorno Patient : 1965   MRN: [id number]  Reason for Admission: COVID+, peritonitis r/t dialysis, ESRD  Discharge Date: 21 RARS: Readmission Risk Score: 19.2 ( )      Last Discharge [facility]       Complaint Diagnosis Description Type Department Provider    21 Nausea Fever, unspecified fever cause . .. ED to Hosp-Admission (Discharged) (ADMITTED) Amanda Ashton MD; Evelina Nicole, ... Spoke with: Marino Adorno      Patient contacted regarding COVID-19 diagnosis. Discussed COVID-19 related testing which was available at this time. Test results were positive. Patient informed of results, if available? Yes. Care Transition Nurse contacted the patient by telephone to perform post discharge assessment. Call within 2 business days of discharge: Yes. Verified name and  with patient as identifiers. Provided introduction to self, and explanation of the CTN/ACM role, and reason for call due to risk factors for infection and/or exposure to COVID-19. Symptoms reviewed with patient who verbalized the following symptoms: cough, dizziness/lightheadedness and no worsening symptoms. Due to no new or worsening symptoms encounter was not routed to provider for escalation. Discussed follow-up appointments.  If no appointment was previously scheduled, appointment scheduling offered: No, patient does not have a PCP  [facility] follow up appointment(s):   Future Appointments   Date Time Provider Hai Lubini   12/22/2021  3:00 PM ECHO ROOM 1 [facility] Daniela    2/2/2022  3:30 PM Tina Lennox, MD FF Cardio MMA     [facility] follow up appointment(s):     Non-face-to-face services provided:  Obtained and reviewed discharge summary and/or continuity of care documents     Advance Care Planning:   Does patient have an Advance Directive:  reviewed and current. Educated patient about risk for severe COVID-19 due to risk factors according to CDC guidelines. CTN reviewed discharge instructions, medical action plan and red flag symptoms with the patient who verbalized understanding. Discussed COVID vaccination status: Yes. Education provided on COVID-19 vaccination as appropriate. Discussed exposure protocols and quarantine with CDC Guidelines. Patient was given an opportunity to verbalize any questions and concerns and agrees to contact CTN or health care provider for questions related to their healthcare. Reviewed and educated patient on any new and changed medications related to discharge diagnosis     Was patient discharged with a pulse oximeter? No     CTN provided contact information. Plan for follow-up call in 5-7 days based on severity of symptoms and risk factors.       Non-face-to-face services provided:  Obtained and reviewed discharge summary and/or continuity of care documents    Care Transitions 24 Hour Call    Do you have any ongoing symptoms?: No  Do you have a copy of your discharge instructions?: Yes  Do you have all of your prescriptions and are they filled?: Yes  Have you been contacted by a [id number] [employer] Pharmacist?: No  Have you scheduled your follow up appointment?: No  Were you discharged with any Home Care or Post Acute Services: Yes  Post Acute Services: [address] [place] [address] you feel like you have everything you need to keep you well at home?: Yes  Care Transitions Interventions         Follow Up  Future Appointments   Date Time Provider Hai Kearns   12/22/2021  3:00 PM ECHO ROOM 1 [facility]   2/2/2022  3:30 PM Khris Lu MD FF Cardio MMA       Fiona Knapp RN

## 2021-12-09 NOTE — CARE COORDINATION
\"Shaggy\" with [facility] contacted CTN and reported that patient does not have a following physician for home care. Patient has not been established with Dr. Channing Altamirano and Dr. Whitney Caldwell, nephrology declines to follow for home care. CTN will remain available.

## 2021-12-11 LAB
BLOOD CULTURE, ROUTINE: NORMAL
BODY FLUID CULTURE, STERILE: NORMAL
CULTURE, BLOOD 2: NORMAL
GRAM STAIN RESULT: NORMAL

## 2021-12-16 RX ORDER — CLOPIDOGREL BISULFATE 75 MG/1
TABLET ORAL
Qty: 30 TABLET | Refills: 0 | Status: SHIPPED | OUTPATIENT
Start: 2021-12-16 | End: 2022-02-04

## 2021-12-16 NOTE — TELEPHONE ENCOUNTER
Received refill request for Clopidogrel from [facility].     Last ov: 10/22/2021 NPTS    Last labs: 12/08/2021 CBC    Last Refill: 09/20/2021 #90 w/ 0     Next appointment: 02/02/2022 [facility]

## 2021-12-17 ENCOUNTER — CARE COORDINATION (OUTPATIENT)
Dept: CASE MANAGEMENT | Age: 56
End: 2021-12-17

## 2021-12-17 NOTE — CARE COORDINATION
[facility] Transitions Follow Up Call    2021    Patient: Jalil Mcclure  Patient : 1965   MRN: [id number]  Reason for Admission: COVID+, peritonitis r/t dialysis, ESRD  Discharge Date: 21 RARS: Readmission Risk Score: 19.2 ( )         Spoke with: Jimy Holt Transitions Subsequent and Final Call    Subsequent and Final Calls  Do you have any ongoing symptoms?: No  Have your medications changed?: No  Do you have any questions related to your medications?: No  Do you currently have any active services?: Yes  Are you currently active with any services?: Home Health  Do you have any needs or concerns that I can assist you with?: No  Identified Barriers: None  Care Transitions Interventions  Other Interventions: Follow Up: Patient reports that he is doing well. He is getting around much better, he denies SOB, fever, dizziness, does have some cough, reports that cough has improved. He did stop the steroids because his BS was in the 6-800's. His blood sugar before lunch today was 145. CTN provided physician referral number, patient accepted and agreed that he needs to establish a PCP. The [facility] nurse was there yesterday and they determined that he did not require any home care at a this time. He will see ortho MD next week for his back. CTN will continue with outreach follow up calls.         Future Appointments   Date Time Provider Hai Kearns   2021  3:00 PM ECHO ROOM 1 [facility]   2022  3:30 PM Kashif Valles MD  Cardio MMA       Colby Abdalla RN

## 2021-12-22 ENCOUNTER — HOSPITAL ENCOUNTER (OUTPATIENT)
Dept: NON INVASIVE DIAGNOSTICS | Age: 56
Discharge: HOME | End: 2021-12-22
Payer: COMMERCIAL

## 2021-12-22 DIAGNOSIS — I25.10 CORONARY ARTERY DISEASE INVOLVING NATIVE CORONARY ARTERY OF NATIVE HEART WITHOUT ANGINA PECTORIS: ICD-10-CM

## 2021-12-22 DIAGNOSIS — I25.5 ISCHEMIC CARDIOMYOPATHY: ICD-10-CM

## 2021-12-22 LAB
LV EF: 38 %
LVEF MODALITY: NORMAL

## 2021-12-22 PROCEDURE — 93306 TTE W/DOPPLER COMPLETE: CPT

## 2021-12-23 ENCOUNTER — TELEPHONE (OUTPATIENT)
Dept: CARDIOLOGY CLINIC | Age: 56
End: 2021-12-23

## 2021-12-23 NOTE — TELEPHONE ENCOUNTER
----- Message from JAYLEN Hood CNP sent at 12/23/2021  7:45 AM EST -----  Stable with some improvement in LVF

## 2021-12-23 NOTE — TELEPHONE ENCOUNTER
Spoke to patient. He asked and was updated with his EF improvement %. Encouraged to stay on all his medication therapy.

## 2021-12-27 RX ORDER — NITROGLYCERIN 0.4 MG/1
TABLET SUBLINGUAL
Qty: 25 TABLET | Refills: 2 | Status: SHIPPED | OUTPATIENT
Start: 2021-12-27 | End: 2022-04-12

## 2021-12-29 ENCOUNTER — CARE COORDINATION (OUTPATIENT)
Dept: CASE MANAGEMENT | Age: 56
End: 2021-12-29

## 2022-01-03 NOTE — PATIENT INSTRUCTIONS
[facility]  Dr. Facundo Alvarez  [address]  [address]  [phone]    Call [phone] Scheduling to schedule Myoview GXT (nuclear stress test) when you can walk better with less pain    Will schedule carotid ultrasound soon  Get fasting labs - lipids, liver enzymes, and BMP soon I agree that he should be seen due to possible exposures with travel.  he should also be assessed for COVID/meningitis/dehyration/influenza.  If needed Zofran can be used for treatment if the person assessing him deems it beneficial for him.     Until he's seen, small amounts (sips) of clear fluids every 10 - 15 minutes as tolerated.

## 2022-01-05 ENCOUNTER — CARE COORDINATION (OUTPATIENT)
Dept: CASE MANAGEMENT | Age: 57
End: 2022-01-05

## 2022-02-01 NOTE — PROGRESS NOTES
[address]    2022    [address] [place] [address] (:  1965) is a 49 y.o. male is here for follow up management of CAD and modifiable risk factors. Referring Provider: JAYLEN Drake CNP    HISTORY:  Mr. Guerline Guillory has a history of coronary artery disease s/p CABG in , s/p PCI of SVG to OM1 on 21; ischemic cardiomyopathy /combined systolic diastolic heart failure; hypertension; hyperlipidemia;  and carotid artery stenosis s/p Rt carotid artery stenting 10/2016. Additional history includes type I diabetes mellitus, ESRD on peritoneal dialysis, CVA, and venous insufficiency. He had a syncopal episode in 2019 preceded by a change in position. On admission, he was found to have Atrial fib that spontaneously terminated. He also had NSVT on cardiac monitor. 19 EP study was done showing no inducible VT. A fib was inducible. Referral was made to  transplant for consideration of kidney-pancreas transplant. His evaluation was placed on hold given his LVEF , 35%. Today, he denies significant shortness of breath, light headedness, dizziness or palpitations. He had noted some midsternal, nonradiating, chest discomfort. Brief in nature. Does resolve with nitroglycerin. Some similar to prior to last PCI but not as severe and does not have neck/jaw discomfort or last as long. He states he is planning on getting  in 2022. REVIEW OF SYSTEMS:  A complete review of systems was reviewed and is negative except as noted in the history of present illness. Prior to Visit Medications    Medication Sig Taking?  Authorizing Provider   cloNIDine (CATAPRES) 0.1 MG tablet Take 1 tablet by mouth 2 times daily Yes Hilary Becker MD   hydrALAZINE (APRESOLINE) 100 MG tablet Take 100 mg by mouth 3 times daily Yes Historical Provider, MD   carvedilol (COREG) 25 MG tablet Take 1 tablet by mouth 2 times daily Yes Hilary Becker MD   nitroGLYCERIN (NITROSTAT) 0.4 MG SL tablet DISSOLVE 1 TAB UNDER TONGUE FOR CHEST PAIN - IF PAIN REMAINS AFTER 5 MIN, CALL 911 AND REPEAT DOSE.  MAX 3 TABS IN 15 MINUTES Yes JAYLEN Barker CNP   clopidogrel (PLAVIX) 75 MG tablet TAKE ONE TABLET BY MOUTH DAILY Yes Katerina Sorto MD   guaiFENesin-dextromethorphan (ROBITUSSIN DM) 100-10 MG/5ML syrup Take 5 mLs by mouth every 4 hours as needed for Cough Yes Hector Blackmon MD   NOVOLOG 100 UNIT/ML injection vial INJECT 40 TO 50 UNITS UNDER THE SKIN DAILY VIA PUMP Yes Jim Rivas MD   isosorbide mononitrate (IMDUR) 30 MG extended release tablet Take 30 mg by mouth daily Yes Historical Provider, MD   ezetimibe (ZETIA) 10 MG tablet TAKE ONE TABLET BY MOUTH DAILY Yes JAYLEN Barker CNP   BASAGLAR KWIKPEN 100 UNIT/ML injection pen INJECT 20 UNITS UNDER THE SKIN ONCE NIGHTLY WHEN OFF PUMP Yes Jim Rivas MD   ranolazine (RANEXA) 500 MG extended release tablet TAKE ONE TABLET BY MOUTH TWICE A DAY Yes JAYLEN Barker CNP   Continuous Blood Gluc Sensor (DEXCOM G6 SENSOR) MISC 1 Device by Does not apply route daily Yes Jim Rivas MD   aspirin 81 MG EC tablet Take 1 tablet by mouth daily Yes Lefty Bustos MD   rosuvastatin (CRESTOR) 40 MG tablet Take 1 tablet by mouth nightly Yes Lefty Bustos MD   Cholecalciferol (VITAMIN D3 ULTRA POTENCY) 1.25 MG (43051 UT) TABS Take 50,000 Units by mouth once a week (on Saturdays) Yes Historical Provider, MD   blood glucose test strips (ACCU-CHEK GUIDE) strip TEST BLOOD SUGAR 6 TIMES A DAY Yes Jim Rivas MD   Methylcobalamin (B-12) 1000 MCG TBDP Place 1,000 mcg under the tongue daily Yes Historical Provider, MD        Allergies   Allergen Reactions    Iodides Anaphylaxis    Shellfish-Derived Products Anaphylaxis    Atorvastatin Calcium Rash       Past Medical History:   Diagnosis Date    Angina [facility] rest ([facility]     Cardiomyopathy ([facility]     Carotid artery stenosis 10/25/2016    SUZANNA stented with 8 x 30 mm non-tapered Xact stent    CHF (congestive heart failure) ([facility] 3/3/15    EF 30%     CKD (chronic kidney disease) stage 2, GFR 60-89 ml/min     Coronary artery disease     sp CABG UC    Diabetic peripheral neuropathy (HCC)     Diastolic HF (heart failure) (HCC)     Hyperlipidemia with target LDL less than 70     Hypertension goal BP (blood pressure) < 130/80     PVD (peripheral vascular disease) (HCC)     Seizures (HCC)     Type 1 diabetes mellitus with chronic kidney disease (HCC)     c-peptide <0.1 in 2015       Past Surgical History:   Procedure Laterality Date    CARDIAC CATHETERIZATION      CARDIAC SURGERY      triple bypass    CAROTID STENT PLACEMENT Right     CORONARY ARTERY BYPASS GRAFT      HERNIA REPAIR      LAPAROSCOPY INSERTION PERITONEAL CATHETER N/A 6/3/2020    LAPAROSCOPIC PLACEMENT OF PERITONEAL DIALYSIS  CATHETER performed by Jackie Gonzalez MD at [address] History     Tobacco Use    Smoking status: Never Smoker    Smokeless tobacco: Never Used   Substance Use Topics    Alcohol use: Yes     Comment: one drink a month        Family History   Problem Relation Age of Onset    Coronary Art Dis Father     Diabetes Father     Coronary Art Dis Paternal Grandmother     Diabetes Paternal Grandmother     Hypertension Mother     Other Mother         Epilepsy    Other Sister         Thyroid disease  Heart murmur    Mult Sclerosis Brother     Arthritis Brother     Sleep Apnea Brother        PHYSICAL EXAMINATION:  Vitals:    02/02/22 1602 02/02/22 1643   BP: (!) 154/78 (!) 170/80   Site: Left Upper Arm    Position: Sitting    Cuff Size: Medium Adult    Pulse: 70    SpO2: 98%    Weight: 194 lb 6.4 oz (88.2 kg)    Height: 5' 7.5\" (1.715 m)      Estimated body mass index is 30 kg/m² as calculated from the following:    Height as of this encounter: 5' 7.5\" (1.715 m). Weight as of this encounter: 194 lb 6.4 oz (88.2 kg).     Physical Exam  Constitutional: Appearance: He is well-developed. He is not diaphoretic. HENT:      Head: Normocephalic and atraumatic. Eyes:      General: No scleral icterus. Extraocular Movements: Extraocular movements intact. Conjunctiva/sclera: Conjunctivae normal.   Neck:      Vascular: Carotid bruit present. No JVD. Comments: Right carotid  Cardiovascular:      Rate and Rhythm: Normal rate and regular rhythm. Heart sounds: Normal heart sounds. No murmur heard. No friction rub. No gallop. Pulmonary:      Effort: Pulmonary effort is normal. No respiratory distress. Breath sounds: Normal breath sounds. No wheezing or rales. Abdominal:      General: Bowel sounds are normal.      Palpations: Abdomen is soft. Tenderness: There is no abdominal tenderness. Musculoskeletal:         General: Normal range of motion. Cervical back: Normal range of motion and neck supple. Skin:     General: Skin is warm and dry. Findings: No rash. Neurological:      General: No focal deficit present. Mental Status: He is alert and oriented to person, place, and time. Psychiatric:         Mood and Affect: Mood normal.         Behavior: Behavior normal.         Thought Content: Thought content normal.         Judgment: Judgment normal.           I have reviewed all pertinent lab results and diagnostic testing.         LABS:  CBC:   Lab Results   Component Value Date    WBC 2.2 12/08/2021    RBC 4.68 12/08/2021    HGB 13.7 12/08/2021    HCT 42.3 12/08/2021    MCV 90.3 12/08/2021    RDW 14.7 12/08/2021     12/08/2021     CMP:   Lab Results   Component Value Date     12/08/2021    K 3.9 12/08/2021     12/08/2021    CO2 18 12/08/2021    BUN 39 12/08/2021    CREATININE 5.1 12/08/2021    GFRAA 14 12/08/2021    GFRAA 44 06/15/2013    AGRATIO 0.8 12/08/2021    LABGLOM 12 12/08/2021    GLUCOSE 304 12/08/2021    PROT 5.8 12/08/2021    PROT 7.9 09/28/2010    CALCIUM 7.6 12/08/2021    BILITOT <0.2 12/08/2021    ALKPHOS 85 12/08/2021    AST 42 12/08/2021    ALT 27 12/08/2021     Lab Results   Component Value Date    CHOL 210 03/02/2021    TRIG 85 03/02/2021    HDL 40 03/02/2021    LDLCALC 153 03/02/2021       PT/INR: No results found for: PTINR     Echo 11/22/21:  Summary   -Normal left ventricle size and wall thickness.   -Overall left ventricular systolic function appears moderately reduced.   -Ejection fraction is visually estimated to be 35-40%. There appears to be   inferior hypokinesis. -Grade II diastolic dysfunction with elevated LV filling pressures. -Mild mitral regurgitation is present.   -Trivial tricuspid regurgitation. Cardiac cath 4/9/21:   Procedure:          LHC, PCI of SVG  Indication:          NSTEMI     Procedure Details:  Consent Access Bleed R Sedat Start Stop Versed Fentanyl Contrast Fluoro EBL Comp Spec   Yes RCFA high MCSFC 1456 1554 1.5 75 132 13.4 <20 None None   *Ultrasound Note: Ultrasound guidance used to determine aforementioned artery patency, size (>2mm), anatomic variations and ideal puncture location. Real-time ultrasound utilized concurrent with vascular needle entry into the artery. Image(s) permanently recorded and reported in the patient chart.   *Sedation Note: MCSFC = minimal conscious sedation for comfort     Findings:  Artery Findings/Result   LM Normal   % ostial   Cx 100% mid   % ostial   RCA Nondominant, Smaller RV marginal with mid 70% and distal 90% lesions supplying collateral to distal Cx (old finding), small but could potentially be stented if symptoms merit   S-OM Patent, 99% ostial with ABEL 1 flow   L-LAD patent   LVEDP 6   LVG 35%, inferior hk      Intervention:         PCI of SVG-OM1 with 3.5 X 23 Xience MARIIA (PD with 3.75NC throughout)     Post Cath Dx:       Severe disease as above, culprit SVG    Lexiscan myoview 4/8/21:  Summary    -There is a moderate sized lateral completely reversible defect c/w    ischemia.    -LV function is mildly depressed with EF=45% and mild inferior lateral    hypokinesis.    -Chest pain this am and under camera.        Recommendation    Pt sent to ER for evaluation and probable cardiac cath in am.,       Echo 3/2/21:  Summary   Definity contrast agent was used to help visualize endocardial borders. Left ventricular cavity size is moderately dilated. Overall left ventricular systolic function appears severely reduced. Ejection fraction is visually estimated to be 30-35%. There is moderate hypokinesis of the basal-mid septal, basal-mid lateral,   basal-mid inferior and basal-mid inferolateral walls. Grade I diastolic dysfunction with normal LV filling pressures. Mild mitral regurgitation. A bubble study was performed and fails to show evidence of shunting. Compared to previous echo, LVEF now reduced    Cardiac cath 7/29/19:  Procedures: LHC, Cor angio, SVG angio, LIMA angio     LM 20%  % ostium  Cx 100% prox. (prior cath had small OM1 visable that is now occluded)   RCA dominant, diffusely diseased w/ distal 90% in area artery is approx 1 mm in diameter. No sig change from 2017. LIMA to LAD patent  SVG to OM2 40%  LVEDP 28 mmHg     Med mgmt  EP eval for NSVT, Afib  Resume AC. Heparin drip pending EP eval.   BP and fluid mgmt per nephrology   No ACE/ARB due to CKD  DAPT, statin, BBlk      Cardiac cath 7/7/17  ANGIOGRAPHIC FINDINGS:  1.  The left main comes from left coronary cusp; distally it gives rise to left circumflex which goes into the OM1.  Rest of the circumflex is occluded.  Left anterior descending artery is occluded. 2.  Saphenous vein graft to obtuse marginal 2 is patent. 3.  Left internal mammary artery, left anterior descending artery is patent andprovides blood to the diagonal branches. 4.  Right coronary artery comes from right coronary cusp.  The posterolateral branch has 90% stenosis present.  Rest of the vessel is patent.   5.  LVEDP was 15 mmHg.  There was no aortic valve gradient    SUMMARY:  1.  Patent left internal mammary artery to left anterior descending artery  2.  Patent saphenous vein graft to obtuse marginal 2  3.  Non-occluded saphenous vein graft to the obtuse marginal.  4.  Right coronary artery in the posterolateral branch with 90% stenosis present. 5.  Occluded left anterior descending artery in the proximal segment  6.  Occluded mid left circumflex. RECOMMENDATIONS:  1.  Continue postoperative care. 2.  Watch for bleeding. 3.  Aggressive risk factor modification. 4.  Continue aggressive antianginal therapy. 5.  If the patient remains symptomatic, we can consider intervention of right posterolateral branch. Lexiscan myoview 4/3/17:  Summary    Technically a satisfactory study.    Non-diagnostic pharmacological stress portion of the study due to resting ST    segment depression.    Moderate sized reversible defect distal inferolateral wall involving LCX or    dominant RCA territory.    Gated study shows estimated EF of 47 .           ASSESSMENT/PLAN:  1. Coronary artery disease involving native coronary artery of native heart without angina pectoris  - hx of CABG   - 7/2017 [facility] --  patent LIMA -LAD, patent SVG to OM2, nonoccluded SVG to OM. RCA in posterolateral branc with 90% stenosis, occluded prox LAD, occluded mid LCx. Continue medical management  - 4/9/21 [facility] - Patent SVG to OM, patent LIMA to LAD, s/p MARIIA to SVG to OM1. Small RV marginal branch (mid 70%, distal 90%) could be stented if symptoms persist (see cath note above)  - ASA, Plavix, Coreg, Imdur, Ranexa, statin    Plan:  Some chest pain but notes some differences from discomfort prior to last PCI. Myoview stress test.      2. Ischemic cardiomyopathy  3.  Chronic combined systolic and diastolic congestive heart failure ([address]  - 12/22/21 Echo - LVEF 35-40%, inferior HK, grade II diastolic dysfunction with elevated LV filling pressures   -Grade II diastolic dysfunction with elevated LV filling pressures. - inferior HK with EF 35% on LHC 4/9/21  - moderate HK of basal-mid septal, basal-mid lateral, basal-mid inferior, basal-mid inferolateral wall. EF 30-35% by echo 3/2/21.    - 5/2018 Echo - EF 40% with HK of inferior and lateral walls. - on Coreg, Hydralazine, Imdur    Plan: Stable. Continue current medical regimen. 4. HTN (hypertension), benign  - Blood pressure (!) 170/80, pulse 70, height 5' 7.5\" (1.715 m), weight 194 lb 6.4 oz (88.2 kg), SpO2 98 %. - Hydralazine, Coreg (also on Imdur, Ranexa)     Plan : Sub-optimal.  Add Clonidine 0.1 mg dily. Continue Hydralazine 100 mg Tid, Imdur 30 mg daily and Coreg 25 mg Bid. 5. Mixed hyperlipidemia  - Rosuvastatin 40 mg, Zetia 10 mg.    - 3/2/21 - TC- 210, TG- 85, HDL- 40, LDL- 153. Plan : Suboptimal.  Repeat Fasting lipid profile, LFTs, CK. If still suboptimal, would add Repatha/Praluent. 6. Bilateral carotid artery stenosis  - 10/2016 s/p right carotid stenting by Dr. Ilya Tejada  - 3/2/21 carotid duplex - SUZANNA with patent stent, upper limits of 50-69% diameter reducing stenosis. LICA < 97% stenosis  - ASA, statin    Plan:  Stable. Carotid duplex. 7.  Atrial fibrillation  - documented Atrial fib noted during 7/2019 admission for syncope. Terminated spontaneously  - 7/2019 EP study - Atrial fib was inducible. Plan : Stable. Continue current medical regimen. 7. Type 1 diabetes mellitus with stage 4 chronic kidney disease (HCC)  - on insulin. - 12/17/21 Hgb A1C - 9.1.    - being considered for kidney-pancreas transplant if LVEF improves above 35%    Plan: Per PCP. 8. History of CVA (cerebrovascular accident)  - 3/2021 : New onset left-sided weakness and paresthesia. Possible ischemic right hemispheric CVA. Thromboembolic versus cardioembolic      Plan:   1. Myoview stress test.  2.  Fasting lipid profile, LFTs, CK.  3.  Carotid duplex.   4.  Start Clonidine 0.1 mg daily and titrate as heart rate and blood pressure allow. Return in about 1 month (around 3/2/2022). Scribe's attestation: This note was scribed in the presence of Vida Lombardi M.D. by Ksenia Tellez RN    Physician Attestation: The scribe's documentation has been prepared under my direction and personally reviewed by me in its entirety. I confirm that the note above accurately reflects all work, treatment, procedures, and medical decision making performed by me. An  electronic signature was used to authenticate this note. Cha Zaldivar MD, [facility], [address]

## 2022-02-02 ENCOUNTER — OFFICE VISIT (OUTPATIENT)
Dept: CARDIOLOGY CLINIC | Age: 57
End: 2022-02-02
Payer: COMMERCIAL

## 2022-02-02 VITALS
BODY MASS INDEX: 29.46 KG/M2 | DIASTOLIC BLOOD PRESSURE: 80 MMHG | OXYGEN SATURATION: 98 % | HEIGHT: 68 IN | SYSTOLIC BLOOD PRESSURE: 170 MMHG | WEIGHT: 194.4 LBS | HEART RATE: 70 BPM

## 2022-02-02 DIAGNOSIS — I25.10 CORONARY ARTERY DISEASE INVOLVING NATIVE CORONARY ARTERY OF NATIVE HEART WITHOUT ANGINA PECTORIS: ICD-10-CM

## 2022-02-02 DIAGNOSIS — R07.9 CHEST PAIN, UNSPECIFIED TYPE: ICD-10-CM

## 2022-02-02 DIAGNOSIS — E10.22 TYPE 1 DIABETES MELLITUS WITH STAGE 4 CHRONIC KIDNEY DISEASE (HCC): Chronic | ICD-10-CM

## 2022-02-02 DIAGNOSIS — N18.4 TYPE 1 DIABETES MELLITUS WITH STAGE 4 CHRONIC KIDNEY DISEASE (HCC): Chronic | ICD-10-CM

## 2022-02-02 DIAGNOSIS — I65.23 BILATERAL CAROTID ARTERY STENOSIS: ICD-10-CM

## 2022-02-02 DIAGNOSIS — Z86.73 HISTORY OF CVA (CEREBROVASCULAR ACCIDENT): ICD-10-CM

## 2022-02-02 DIAGNOSIS — E78.2 MIXED HYPERLIPIDEMIA: ICD-10-CM

## 2022-02-02 DIAGNOSIS — I10 HTN (HYPERTENSION), BENIGN: ICD-10-CM

## 2022-02-02 DIAGNOSIS — I50.42 CHRONIC COMBINED SYSTOLIC AND DIASTOLIC CONGESTIVE HEART FAILURE (HCC): ICD-10-CM

## 2022-02-02 DIAGNOSIS — I48.91 ATRIAL FIBRILLATION, UNSPECIFIED TYPE (HCC): ICD-10-CM

## 2022-02-02 DIAGNOSIS — I25.5 ISCHEMIC CARDIOMYOPATHY: ICD-10-CM

## 2022-02-02 PROCEDURE — 1036F TOBACCO NON-USER: CPT | Performed by: INTERNAL MEDICINE

## 2022-02-02 PROCEDURE — G8427 DOCREV CUR MEDS BY ELIG CLIN: HCPCS | Performed by: INTERNAL MEDICINE

## 2022-02-02 PROCEDURE — 93000 ELECTROCARDIOGRAM COMPLETE: CPT | Performed by: INTERNAL MEDICINE

## 2022-02-02 PROCEDURE — 2022F DILAT RTA XM EVC RTNOPTHY: CPT | Performed by: INTERNAL MEDICINE

## 2022-02-02 PROCEDURE — G8417 CALC BMI ABV UP PARAM F/U: HCPCS | Performed by: INTERNAL MEDICINE

## 2022-02-02 PROCEDURE — 3017F COLORECTAL CA SCREEN DOC REV: CPT | Performed by: INTERNAL MEDICINE

## 2022-02-02 PROCEDURE — 3046F HEMOGLOBIN A1C LEVEL >9.0%: CPT | Performed by: INTERNAL MEDICINE

## 2022-02-02 PROCEDURE — G8484 FLU IMMUNIZE NO ADMIN: HCPCS | Performed by: INTERNAL MEDICINE

## 2022-02-02 PROCEDURE — 99214 OFFICE O/P EST MOD 30 MIN: CPT | Performed by: INTERNAL MEDICINE

## 2022-02-02 RX ORDER — CLONIDINE HYDROCHLORIDE 0.1 MG/1
0.1 TABLET ORAL 2 TIMES DAILY
Qty: 60 TABLET | Refills: 5 | Status: SHIPPED
Start: 2022-02-02 | End: 2022-03-09 | Stop reason: DRUGHIGH

## 2022-02-02 RX ORDER — HYDRALAZINE HYDROCHLORIDE 100 MG/1
100 TABLET, FILM COATED ORAL 3 TIMES DAILY
COMMUNITY
End: 2022-04-08

## 2022-02-02 RX ORDER — CARVEDILOL 25 MG/1
25 TABLET ORAL 2 TIMES DAILY
Qty: 60 TABLET | Refills: 5 | Status: SHIPPED
Start: 2022-02-02 | End: 2022-09-20

## 2022-02-02 NOTE — PATIENT INSTRUCTIONS
1.  Schedule carotid duplex soon  2. Schedule Lexiscan Myoview GXT soon  3. Recheck fasting lipids, AST, ALT, CK soon   4. Add Clonidine 0.1 mg daily to improved BP. Monitor blood pressure at home  5.   Follow up with Sapna NERI in one month

## 2022-02-04 RX ORDER — CLOPIDOGREL BISULFATE 75 MG/1
TABLET ORAL
Qty: 90 TABLET | Refills: 2 | Status: SHIPPED | OUTPATIENT
Start: 2022-02-04

## 2022-02-04 NOTE — LETTER
stimulants, such as caffeine pills or energy drinks, certain weight loss supplements and oral decongestants. Dependence withdrawal symptoms may include depressed mood, loss of interest, suicidal thoughts, anxiety, fatigue, appetite changes and agitation. Testosterone replacement therapy:  Potential side effects include increased risk of stroke and heart attack, blood clots, increased blood pressure, increased cholesterol, enlarged prostate, sleep apnea, irritability/aggression and other mood disorders, and decreased fertility. Other:     1. I understand that I have the following responsibilities:  · I will take medications at the dose and frequency prescribed. · I will not increase or change how I take my medications without the approval of the health care provider who signs this Medication Agreement. · I will arrange for refills at the prescribed interval ONLY during regular office hours. I will not ask for refills earlier than agreed, after-hours, on holidays or on weekends. · I will obtain all refills for these medications at  ·  ____________________________________  pharmacy (phone number  ·  ________________________), with full consent for my provider and pharmacist to exchange information in writing or verbally. · I will not request any pain medications or controlled substances from other providers and will inform this provider of all other medications I am taking. · I will inform my other health care providers that I am taking these medications and of the existence of this [id number]. In the event of an emergency, I will provide the same information to the emergency department providers. · I will protect my prescriptions and medications. I understand that lost or misplaced prescriptions will not be replaced. · I will keep medications only for my own use and will not share them with others. I will keep all medications away from children. · I agree to participate in any medical, psychological or psychiatric assessments recommended by my provider. · I will actively participate in any program designed to improve function, including social, physical, psychological and daily or work activities. 2. I will not use illegal or street drugs or another person's prescription. If I have an addiction problem with drugs or alcohol and my provider asks me to enter a program to address this issue, I agree to follow through. Such programs may include:  · 12-Step program and securing a sponsor  · Individual counseling   · Inpatient or outpatient treatment  · Other:_____________________________________________________________________________________________________________________________________________    If in treatment, I will request that a copy of the programs initial evaluation and treatment recommendations be sent to this provider and will not expect refills until that is received. I will also request written monthly updates be sent to this provider to verify my continuing treatment. 3. I will consent to drug screening upon my providers request to assure I am only taking the prescribed drugs, described in this MEDICATION AGREEMENT. I understand that a drug screen is a laboratory test in which a sample of my urine, blood or saliva is checked to see what drugs I have been taking. 4. I agree that I will treat the providers and staff at this office with respect at all times. I will keep all of my scheduled appointments, but if I need to cancel my appointment, I will do so a minimum of 24 hours before it is scheduled. 5. I understand that this provider may stop prescribing the medications listed if:  · I do not show any improvement in pain, or my activity has not improved. · I develop rapid tolerance or loss of improvement, as described in my treatment plan. · I develop significant side effects from the medication. Hydroquinone Counseling:  Patient advised that medication may result in skin irritation, lightening (hypopigmentation), dryness, and burning.  In the event of skin irritation, the patient was advised to reduce the amount of the drug applied or use it less frequently.  Rarely, spots that are treated with hydroquinone can become darker (pseudoochronosis).  Should this occur, patient instructed to stop medication and call the office. The patient verbalized understanding of the proper use and possible adverse effects of hydroquinone.  All of the patient's questions and concerns were addressed.

## 2022-02-04 NOTE — TELEPHONE ENCOUNTER
Received refill request for Plavix from [address] [place] [address].      Last OV: 02/02/2022 kojo/ Sean Oconnor    Last Refill: 12/16/2021 #30 w/ 0 refills     Next Appointment: 10/05/2022 kojo/ Sean Oconnor

## 2022-02-09 ENCOUNTER — HOSPITAL ENCOUNTER (OUTPATIENT)
Dept: CT IMAGING | Age: 57
Discharge: HOME | End: 2022-02-09
Payer: COMMERCIAL

## 2022-02-09 DIAGNOSIS — M54.6 PAIN IN THORACIC SPINE: ICD-10-CM

## 2022-02-09 DIAGNOSIS — M54.50 LOW BACK PAIN, UNSPECIFIED BACK PAIN LATERALITY, UNSPECIFIED CHRONICITY, UNSPECIFIED WHETHER SCIATICA PRESENT: ICD-10-CM

## 2022-02-09 PROCEDURE — 72128 CT CHEST SPINE W/O DYE: CPT

## 2022-02-09 PROCEDURE — 72131 CT LUMBAR SPINE W/O DYE: CPT

## 2022-02-16 ENCOUNTER — HOSPITAL ENCOUNTER (OUTPATIENT)
Dept: VASCULAR LAB | Age: 57
Discharge: HOME | End: 2022-02-16
Payer: COMMERCIAL

## 2022-02-16 ENCOUNTER — HOSPITAL ENCOUNTER (OUTPATIENT)
Dept: NON INVASIVE DIAGNOSTICS | Age: 57
Discharge: HOME | End: 2022-02-16
Payer: COMMERCIAL

## 2022-02-16 DIAGNOSIS — R07.9 CHEST PAIN, UNSPECIFIED TYPE: ICD-10-CM

## 2022-02-16 DIAGNOSIS — I25.10 CORONARY ARTERY DISEASE INVOLVING NATIVE CORONARY ARTERY OF NATIVE HEART WITHOUT ANGINA PECTORIS: ICD-10-CM

## 2022-02-16 DIAGNOSIS — I65.23 BILATERAL CAROTID ARTERY STENOSIS: ICD-10-CM

## 2022-02-16 PROCEDURE — 93880 EXTRACRANIAL BILAT STUDY: CPT

## 2022-02-16 NOTE — PROGRESS NOTES
[facility]            Cardiology Progress Note   Alina Vasquez  1965 February 24, 2022    CC: \"I am doing okay. \"    HPI:  The patient is 49 y.o. male with a past medical history significant for hypertension, hyperlipidemia, diabetes, type I-age 14, CKD, CHF, carotid stenosis, PVD, CAD s/p PCI, had recurrent in stent restenosis subsequently then underwent CABG (symptoms were sudden-SOB, unable to walk)    Today, he is here to discuss his carotid Doppler. He says that he has recently got an insulin pump and his last A1c is 9.1. He says that he is getting on the list to have a kidney and pancreas transplant. He does have dye allergy. Patient denies exertional chest pain/pressure, dyspnea at rest, MOISE, PND, orthopnea, palpitations, lightheadedness, weight changes, changes in LE edema, and syncope.      Past Medical History:   Diagnosis Date    Angina at rest ([address]     Cardiomyopathy [facility]     Carotid artery stenosis 10/25/2016    SUZANNA stented with 8 x 30 mm non-tapered Xact stent    CHF (congestive heart failure) ([address] 3/3/15    EF 30%     CKD (chronic kidney disease) stage 2, GFR 60-89 ml/min     Coronary artery disease     sp CABG UC    Diabetic peripheral neuropathy (HCC)     Di[facility] failure[facility]     Hyperlipidemia with target LDL less than 70     Hypertension goal BP (blood pressure) < 130/80     PVD (peripheral vascular disease) (HCC)     Seizures (HCC)     Type 1 diabetes mellitus with chronic kidney disease ([facility]     c-peptide <0.1 in 2015     Past Surgical History:   Procedure Laterality Date    CARDIAC CATHETERIZATION      CARDIAC SURGERY      triple bypass    CAROTID STENT PLACEMENT Right     CORONARY ARTERY BYPASS GRAFT      HERNIA REPAIR      LAPAROSCOPY INSERTION PERITONEAL CATHETER N/A 6/3/2020    LAPAROSCOPIC PLACEMENT OF PERITONEAL DIALYSIS  CATHETER performed by Charlette Spencer MD at [facility]       Family History   Problem Relation Age of Onset    Coronary Art Dis Father     Diabetes Father     Coronary Art Dis Paternal Grandmother     Diabetes Paternal Grandmother     Hypertension Mother     Other Mother         Epilepsy    Other Sister         Thyroid disease  Heart murmur    Mult Sclerosis Brother     Arthritis Brother     Sleep Apnea Brother      Social History     Tobacco Use    Smoking status: Never Smoker    Smokeless tobacco: Never Used   Vaping Use    Vaping Use: Never used   Substance Use Topics    Alcohol use: Yes     Comment: one drink a month    Drug use: No       Allergies   Allergen Reactions    Iodides Anaphylaxis    Shellfish-Derived Products Anaphylaxis    Atorvastatin Calcium Rash     Current Outpatient Medications   Medication Sig Dispense Refill    clopidogrel (PLAVIX) 75 MG tablet TAKE ONE TABLET BY MOUTH DAILY 90 tablet 2    cloNIDine (CATAPRES) 0.1 MG tablet Take 1 tablet by mouth 2 times daily 60 tablet 5    hydrALAZINE (APRESOLINE) 100 MG tablet Take 100 mg by mouth 3 times daily      carvedilol (COREG) 25 MG tablet Take 1 tablet by mouth 2 times daily 60 tablet 5    nitroGLYCERIN (NITROSTAT) 0.4 MG SL tablet DISSOLVE 1 TAB UNDER TONGUE FOR CHEST PAIN - IF PAIN REMAINS AFTER 5 MIN, CALL 911 AND REPEAT DOSE.  MAX 3 TABS IN 15 MINUTES 25 tablet 2    NOVOLOG 100 UNIT/ML injection vial INJECT 40 TO 50 UNITS UNDER THE SKIN DAILY VIA PUMP 10 mL 3    isosorbide mononitrate (IMDUR) 30 MG extended release tablet Take 30 mg by mouth daily      ezetimibe (ZETIA) 10 MG tablet TAKE ONE TABLET BY MOUTH DAILY 30 tablet 3    BASAGLAR KWIKPEN 100 UNIT/ML injection pen INJECT 20 UNITS UNDER THE SKIN ONCE NIGHTLY WHEN OFF PUMP 5 pen 3    ranolazine (RANEXA) 500 MG extended release tablet TAKE ONE TABLET BY MOUTH TWICE A  tablet 1    Continuous Blood Gluc Sensor (DEXCOM G6 SENSOR) MISC 1 Device by Does not apply route daily 1 each 0    aspirin 81 MG EC tablet Take 1 tablet by mouth daily 30 tablet 3    rosuvastatin (CRESTOR) 40 MG tablet Take 1 tablet by mouth nightly 30 tablet 3    Cholecalciferol (VITAMIN D3 ULTRA POTENCY) 1.25 MG (74548 UT) TABS Take 50,000 Units by mouth once a week (on Saturdays)      blood glucose test strips (ACCU-CHEK GUIDE) strip TEST BLOOD SUGAR 6 TIMES A  strip 5    Methylcobalamin (B-12) 1000 MCG TBDP Place 1,000 mcg under the tongue daily       No current facility-administered medications for this visit. Review of Systems:    Constitutional: negative  Eyes: negative  Ears, nose, mouth, throat, and face: negative  Respiratory: negative  Cardiovascular: CP   Gastrointestinal: negative  Genitourinary:negative  Integument/breast: negative  Hematologic/lymphatic: negative  Musculoskeletal:negative  Neurological: negative  Behavioral/Psych: negative  Endocrine: negative  Allergic/Immunologic: negative     Physical Exam:   Vitals:    02/24/22 1256   BP: 128/72   Pulse: 78   SpO2: 100%   Weight: 195 lb (88.5 kg)   Height: 5' 7.5\" (1.715 m)     Wt Readings from Last 3 Encounters:   02/24/22 195 lb (88.5 kg)   02/02/22 194 lb 6.4 oz (88.2 kg)   12/08/21 191 lb 12.8 oz (87 kg)       Constitutional: He is oriented to person, place, and time. He appears well-developed and well-nourished. In no acute distress. Head: Normocephalic and atraumatic. Pupils equal and round. Neck: Neck supple. No JVP or carotid bruit appreciated. No mass and no thyromegaly present. No lymphadenopathy present. Cardiovascular: Normal rate. Normal heart sounds. Exam reveals no gallop and no friction rub. No murmur heard. Pulmonary/Chest: Effort normal and breath sounds normal. No respiratory distress. He has no wheezes, rhonchi or rales. Abdominal: Soft, non-tender. Bowel sounds are normal. He exhibits no organomegaly, mass or bruit. Extremities: No edema, cyanosis, or clubbing. Pulses are 2+ radial/dorsalis pedis/posterior tibial/carotid bilaterally.     Neurological: Awake  alert and orientedNo gross cranial nerve deficit. Coordination normal.     Skin: Skin is warm and dry. There is no rash or diaphoresis. Psychiatric: He has a normal mood and affect. His speech is normal and behavior is normal.     Lab Review:     Lab Results   Component Value Date    TRIG 85 03/02/2021    HDL 40 03/02/2021    LDLCALC 153 03/02/2021    LABVLDL 17 03/02/2021     Lab Results   Component Value Date    BUN 39 12/08/2021    CREATININE 5.1 12/08/2021       Image Review:     Carotid Duplex:7/24/2013 at [facility] [address]  1. Estimated diameter reduction of the right internal carotid artery is high end 50-69%. 2. Estimated diameter reduction of the left internal carotid artery is less than 50%. 3. The bilateral common and external carotid arteries reveal no significant stenosis. 4. There is greater than 50% stenosis of the right proximal vertebral artery. 5. There is normal antegrade flow in the left vertebral artery. CATH:3/9/15 per Dr[facility] S[facility]  Cath EF 35% with LVEDP 25. Findings c/w diastolic heart failure,acute on chronic  Patent LIMA to LAD,patent SVG to OM1 and OM2  Native left coronary occluded with occluded ostial LAD stent and mid CFX stent occluded. Native RCA nondominant with diffusely diseased acute marginal branch of RCA too small for intervention  Rec- will need diuretic and afterload reduction    Lower extremity arterial plethysmography:3/18/2015  Right Impression   Right CATHI: 0.94. This is consistent with no significant PAD at rest.   Right CATHI: 1.32. This is consistent with non diagnostic reading due to   calcific arteries most likely due to DM. Continuous wave Doppler is normal throughout the right lower extremity. Right pulse volume recordings are normal.   Left Impression   Left CATHI: 1.04. This is consistent with no significant PAD at rest.   Left CATHI: 1.19. This is consistent with mild calcific vessel.    Continuous wave Doppler is normal throughout the left lower extremity. Left pulse volume recordings are normal     ECHO:3/3/2015  Left ventricle size is normal. Normal left ventricular wall thickness. Left ventricular function is reduced with EF of 30%  Diffuse hypokinesis with more profound involvement of inferior wall. Mild mitral regurgitation is present. Carotid Duplex:3/18/2015  Right Impression   The right internal carotid artery appears to have a 80-99% diameter reducing   stenosis based on velocity criteria, but cannot rule out total occlusion due   to visualization limitations. Consider other testing to quantify. The right external carotid artery appears to have elevated velocities. The right vertebral artery demonstrates normal antegrade flow. Left Impression   The left internal carotid artery appears to have a 1-15% diameter reducing   stenosis based on velocity criteria. The left vertebral artery demonstrates normal antegrade flow. Plaque noted in the Left Bulb     Stress Myoview:5/18/2016  Summary   1. Technically a satisfactory study. 2. Non diagnostic treadmill exercise stress portion of the study because of   resting ST abnormalities   3. No evidence of Ischemia by Myocardial Perfusion Imaging. Basal   inferolateral scar   4. Gated Study shows dilated LV with EF of 36 %. ECHO:5/18/16  Summary  Overall left ventricular systolic function is moderately reduced. Ejection fraction is visually estimated to be around 40 %. There is severe hypokinesis of the inferolateral wall and basal inferior wall. Normal left ventricular cavity size and wall thickness. Possible grade II diastolic dysfunction. The right ventricle is not well visualized. There is mild mitral regurgitation. Trace aortic, tricuspid, and pulmonic regurgitation. MRI BRAIN wo contrast:9/11/15  IMPRESSION:   1. No acute edema. 2. Microangiopathic changes present of the posterior trigone   bilaterally.  This does not appear as acute infarcts on diffusion-weighted images with ADC mapping. 3. Smooth mild narrowing of the distal left internal carotid   artery. 4. Dominant right vertebral.     Bilateral carotid angiography:10/5/15  ANGIOGRAPHIC FINDINGS:  1. The right common carotid artery comes from the right brachiocephalic trunk. It gives rise to right internal and external carotid arteries. The right internal carotid artery in the proximal portion has 90% stenosis present. It divides and gives rise to the right anterior cerebral artery and right middle cerebral artery and communicates via the anterior communicating artery with the left anterior cerebral artery. 2. The left common carotid artery comes from the aortic arch. It gives rise to the left internal and external carotid arteries. The left internal carotid artery _divides into_ left anterior cerebral artery and left middle cerebral artery and has mild plaque present. Distal left internal carotid artery has approximately 60% stenosis present. This was in the intracranial portion. SUMMARY: Severe right internal carotid stenosis of approximately 90% severity. [facility] with bilateral selective carotid angiography:9/23/16  ANGIOGRAPHIC FINDINGS:  1. The left main coronary artery comes from the left coronary cusp, giving  rise to a left anterior descending artery, left circumflex artery. The left  anterior descending artery is occluded in the proximal portion. The left  circumflex is occluded in the mid portion. There is an obtuse marginal 1,  which is patent. 2. The right coronary artery is codominant, a small size. It gives rise to a  posterior descending artery and posterolateral branch. The posterior  descending part has around 80% stenosis present. 3. Saphenous vein graft to obtuse marginal 2 is patent. There is another  limb to possible obtuse marginal 3, which is occluded.   4. The left internal mammary artery to left anterior descending artery is  patent with good flow to the left anterior descending without significant  stenosis. 5. Left ventricular end-diastolic pressure was 10. CAROTID CIRCULATION:  1. The right common carotid artery comes from brachiocephalic trunk, gives  rise to the right internal and external carotid arteries. 2. The right internal carotid artery has approximately 80-90% stenosis  present. 3. The left common carotid artery comes from aortic arch, giving rise to the  left internal and external carotid arteries, which are patent. The left  internal carotid artery _____ into the intracranial portion, has 50% stenosis  present. Both sides of anterior cerebral and middle cerebral arteries are  patent; the right anterior cerebral, right middle cerebral, left anterior  cerebral, left middle cerebral arteries are patent. IN SUMMARY:   1. Sever native coronary artery disease with occluded jump graft from  saphenous vein to obtuse marginal 3 occluded, with right posterior descending  artery mid portion having 80% stenosis. 2. The right internal carotid artery has 80-90% stenosis. Carotid and cerebral angiogram:10/25/16  ANGIOGRAPHIC FINDINGS:   1. The right common carotid artery comes from brachiocephalic branch of the aorta; it divides into right internal and external carotid arteries. The right internal carotid artery has approximately 80% to 90% stenosis. The  right internal carotid artery divides into right anterior cerebral and right middle cerebral arteries. 2. We intervened on right internal carotid artery after we placed a proximal embolic protection device, Moma. We placed 1 carotid stent 8 mm in dimension from [employer] into the right internal carotid artery successfully. 3. There was a brief episode of loss of left arm function with slurring of speech, which probably was attributed to occlusion of the circulation of the right carotid arterial system with the embolic protection device.  His   symptoms quickly resolved after the right carotid circulation was established. Carotid Duplex:4/3/17  Right Impression   The right internal carotid artery appears to have a 1-15% diameter reducing   stenosis based on velocity criteria. There is a fully patent stent in the right internal carotid artery. There are elevated velocities in the right external carotid artery. The right vertebral artery demonstrates normal antegrade flow. Left Impression   The left internal carotid artery appears to have a 1-15% diameter reducing   stenosis based on velocity criteria. There are elevated velocities in the left internal carotid artery. The left vertebral artery demonstrates normal antegrade flow. Stress test:4/3/17 (walking Lexiscan)   Summary    Technically a satisfactory study.    Non-diagnostic pharmacological stress portion of the study due to resting ST    segment depression.    Moderate sized reversible defect distal inferolateral wall involving LCX or    dominant RCA territory.    Gated study shows estimated EF of 47 . Bilateral carotid Doppler 2/16/2022  The right internal carotid artery appears to have a greater than or equal to   70% diameter reducing stenosis based on carotid stent velocity criteria. The right vertebral artery demonstrates normal antegrade flow. The right subclavian artery is visualized and demonstrates multiphasic flow.       Spoke with Dr. Joan Cortes nurse, hold patient until she speaks with   Dr. Eunice Garcia. Left   The left internal carotid artery appears to have a <50% diameter reducing   stenosis based on velocity criteria. The left external carotid artery appears to have >50% diameter reduction based   on velocity criteria. The left vertebral artery demonstrates normal antegrade flow. The left subclavian artery is visualized and demonstrates multiphasic flow.        Assessment/Plan:    Right carotid stenosis:   Status post stenting 10/25/16  Carotid Duplex 4/3/17-carotid stent patent  Will order CTA head and neck to evaluate stenosis. Continue Asa, Plavix and statin therapy. Reviewed Doppler increased velocity within the stent suggest significant stenosis. Continue aggressive risk factor modification. Coronary artery disease  Asymptomatic. Continue Asa, Plavix, B-blocker and statin therapy. Ischemic Cardiomyoipathy:   Appears compensated on exam.     Hypertension:    Controlled. Continue current medical management. Chronic renal insufficiency:   Dr. Javier Stevens managing     Diabetes: On insulin pump therapy  Managed per endocrinology-Dr. Rebecca Morgan    Hyperlipidemia:   Continue statin therapy. Dye allergy (No problems with contrast with recent cath)   Benadryl 25 mg, pepcid 10 mg and Prednisone 40 mg every 8 hours times 3 doses prior to cath due to iodine allergy        Thank you very much for allowing me to participate in the care of your patient. Please do not hesitate to contact me if you have any questions. Sincerely,    Apple Knapp MD, [address] [place] [address]  Ph: [phone]  Fax: [phone]    This note was scribed in the presence of Dr Stew Sarah, by Kelsey Washington RN. Physician Attestation:  The scribes documentation has been prepared under my direction and personally reviewed by me in its entirety. I confirm that the note above accurately reflects all work, treatment, procedures, and medical decision making performed by me.

## 2022-02-24 ENCOUNTER — OFFICE VISIT (OUTPATIENT)
Dept: CARDIOLOGY CLINIC | Age: 57
End: 2022-02-24
Payer: COMMERCIAL

## 2022-02-24 VITALS
HEART RATE: 78 BPM | WEIGHT: 195 LBS | DIASTOLIC BLOOD PRESSURE: 72 MMHG | OXYGEN SATURATION: 100 % | SYSTOLIC BLOOD PRESSURE: 128 MMHG | HEIGHT: 68 IN | BODY MASS INDEX: 29.55 KG/M2

## 2022-02-24 DIAGNOSIS — I25.5 ISCHEMIC CARDIOMYOPATHY: ICD-10-CM

## 2022-02-24 DIAGNOSIS — I10 HTN (HYPERTENSION), BENIGN: ICD-10-CM

## 2022-02-24 DIAGNOSIS — E78.2 MIXED HYPERLIPIDEMIA: ICD-10-CM

## 2022-02-24 DIAGNOSIS — I25.10 CORONARY ARTERY DISEASE INVOLVING NATIVE CORONARY ARTERY OF NATIVE HEART WITHOUT ANGINA PECTORIS: ICD-10-CM

## 2022-02-24 DIAGNOSIS — I65.21 STENOSIS OF RIGHT CAROTID ARTERY: Primary | ICD-10-CM

## 2022-02-24 DIAGNOSIS — I50.42 CHRONIC COMBINED SYSTOLIC AND DIASTOLIC CONGESTIVE HEART FAILURE (HCC): ICD-10-CM

## 2022-02-24 PROCEDURE — 1036F TOBACCO NON-USER: CPT | Performed by: INTERNAL MEDICINE

## 2022-02-24 PROCEDURE — 99214 OFFICE O/P EST MOD 30 MIN: CPT | Performed by: INTERNAL MEDICINE

## 2022-02-24 PROCEDURE — 3017F COLORECTAL CA SCREEN DOC REV: CPT | Performed by: INTERNAL MEDICINE

## 2022-02-24 PROCEDURE — G8484 FLU IMMUNIZE NO ADMIN: HCPCS | Performed by: INTERNAL MEDICINE

## 2022-02-24 PROCEDURE — G8417 CALC BMI ABV UP PARAM F/U: HCPCS | Performed by: INTERNAL MEDICINE

## 2022-02-24 PROCEDURE — G8427 DOCREV CUR MEDS BY ELIG CLIN: HCPCS | Performed by: INTERNAL MEDICINE

## 2022-02-24 RX ORDER — FAMOTIDINE 10 MG
TABLET ORAL
Qty: 5 TABLET | Refills: 0 | Status: ON HOLD
Start: 2022-02-24 | End: 2022-10-03 | Stop reason: HOSPADM

## 2022-02-24 RX ORDER — DIPHENHYDRAMINE HCL 25 MG
CAPSULE ORAL
Qty: 5 CAPSULE | Refills: 0 | Status: ON HOLD | OUTPATIENT
Start: 2022-02-24 | End: 2022-05-20

## 2022-02-24 RX ORDER — PREDNISONE 20 MG/1
TABLET ORAL
Qty: 20 TABLET | Refills: 0 | Status: ON HOLD | OUTPATIENT
Start: 2022-02-24 | End: 2022-05-20

## 2022-02-24 NOTE — PATIENT INSTRUCTIONS
Patient Education        Carotid Stenosis: Care Instructions  Overview     Carotid stenosis is narrowing of one or both of the carotid arteries. These arteries take blood from the heart to the brain. There is one on each side of the neck. Carotid artery stenosis is caused by a process called hardening of the arteries, or atherosclerosis. A substance called plaque builds up inside the carotid arteries. Things that can lead to plaque include diabetes, high blood pressure, high cholesterol, and smoking. This plaque may limit blood flow to your brain. If plaque breaks open, it may form a blood clot. Or pieces of the plaque may break off. A piece of plaque or a blood clot could move to the brain, causing a stroke or transient ischemic attack (TIA). The goal of treatment is to lower your risk of having a stroke or TIA. You can lower your risk by having a heart-healthy lifestyle and taking medicine. Sometimes a surgery or procedure is also done. Follow-up care is a key part of your treatment and safety. Be sure to make and go to all appointments, and call your doctor if you are having problems. It's also a good idea to know your test results and keep a list of the medicines you take. How can you care for yourself at home? · Take your medicines exactly as prescribed. Call your doctor if you think you are having a problem with your medicine. You may take medicine to lower your blood pressure, to lower your cholesterol, or to prevent blood clots. · If you take a blood thinner, such as aspirin, be sure to get instructions about how to take your medicine safely. Blood thinners can cause serious bleeding problems. · Do not smoke. People who smoke have a higher chance of stroke than those who quit. If you need help quitting, talk to your doctor about stop-smoking programs and medicines. These can increase your chances of quitting for good. · Eat heart-healthy foods.  These foods include vegetables, fruits, nuts, beans, lean meat, fish, and whole grains. You limit things that are not so good for your heart, like sodium, alcohol, and sugar. · Stay at a healthy weight. Lose weight if you need to. · Be active. Ask your doctor what type and level of exercise is safe for you. · Limit alcohol to 2 drinks a day for men and 1 drink a day for women. Too much alcohol can cause health problems. · Manage other health problems such as diabetes, high blood pressure, and high cholesterol. If you think you may have a problem with alcohol or drug use, talk to your doctor. · Avoid colds and flu. Get the flu vaccine every year. When should you call for help? Call 911 anytime you think you may need emergency care. For example, call if:    · You passed out (lost consciousness).     · You have symptoms of a stroke. These may include:  ? Sudden numbness, tingling, weakness, or loss of movement in your face, arm, or leg, especially on only one side of your body. ? Sudden vision changes. ? Sudden trouble speaking. ? Sudden confusion or trouble understanding simple statements. ? Sudden problems with walking or balance. ? A sudden, severe headache that is different from past headaches. Call your doctor now or seek immediate medical care if:    · You are dizzy or lightheaded, or you feel like you may faint. Watch closely for changes in your health, and be sure to contact your doctor if you have any problems. Where can you learn more? Go to https://[employer]. org and sign in to your Snip[employer] account. Enter B554 in the Rarelook box to learn more about \"Carotid Stenosis: Care Instructions. \"     If you do not have an account, please click on the \"Sign Up Now\" link. Current as of: April 29, 2021               Content Version: 13.1  © 0940-4081 Healthwise, "[employer]. Care instructions adapted under license by [facility] ([facility]).  If you have questions about a medical condition or this instruction, always ask your healthcare professional. [address] any warranty or liability for your use of this information.

## 2022-03-03 ENCOUNTER — OFFICE VISIT (OUTPATIENT)
Dept: CARDIOLOGY CLINIC | Age: 57
End: 2022-03-03
Payer: COMMERCIAL

## 2022-03-03 VITALS
HEART RATE: 68 BPM | WEIGHT: 198 LBS | HEIGHT: 68 IN | OXYGEN SATURATION: 99 % | SYSTOLIC BLOOD PRESSURE: 120 MMHG | BODY MASS INDEX: 30.01 KG/M2 | DIASTOLIC BLOOD PRESSURE: 70 MMHG

## 2022-03-03 DIAGNOSIS — I25.10 CORONARY ARTERY DISEASE DUE TO LIPID RICH PLAQUE: ICD-10-CM

## 2022-03-03 DIAGNOSIS — E78.2 MIXED HYPERLIPIDEMIA: ICD-10-CM

## 2022-03-03 DIAGNOSIS — I25.5 ISCHEMIC CARDIOMYOPATHY: Primary | ICD-10-CM

## 2022-03-03 DIAGNOSIS — I25.83 CORONARY ARTERY DISEASE DUE TO LIPID RICH PLAQUE: ICD-10-CM

## 2022-03-03 DIAGNOSIS — I10 PRIMARY HYPERTENSION: ICD-10-CM

## 2022-03-03 PROCEDURE — G8427 DOCREV CUR MEDS BY ELIG CLIN: HCPCS | Performed by: NURSE PRACTITIONER

## 2022-03-03 PROCEDURE — 1036F TOBACCO NON-USER: CPT | Performed by: NURSE PRACTITIONER

## 2022-03-03 PROCEDURE — G8484 FLU IMMUNIZE NO ADMIN: HCPCS | Performed by: NURSE PRACTITIONER

## 2022-03-03 PROCEDURE — 99214 OFFICE O/P EST MOD 30 MIN: CPT | Performed by: NURSE PRACTITIONER

## 2022-03-03 PROCEDURE — G8417 CALC BMI ABV UP PARAM F/U: HCPCS | Performed by: NURSE PRACTITIONER

## 2022-03-03 PROCEDURE — 3017F COLORECTAL CA SCREEN DOC REV: CPT | Performed by: NURSE PRACTITIONER

## 2022-03-03 NOTE — PROGRESS NOTES
[facility]     Outpatient Follow Up Note    Abram Wells is 49 y.o. male who presents today with a history of CAD s/p CABG '01, s/p PTCA SVG > OM-1, HTN, CM/EF 35% and hyperlipidemia. His other hx includes: ESRD, CVA, carotid stenosis s/p Rt CEA '16 (Dr. Ann )    Interval hx: per NE's note: referred to [address] transplant for kidney-pancreas transplant (had been on hold with LVEF < 35%)    CHIEF COMPLAINT / HPI:  Follow Up secondary to HTN starting clonidine  His BP has been ~ 112/70    Subjective:   He hasn't had any exertional chest discomfort for a couple of weeks. He thinks it was caused by his BP and stress with work and planning a wedding. He's been more consistent with timing his medicines. He gets winded easily  His myoview was postponed d/t results of his carotid US. He is scheduled for a CT neck next week and following with Dr. Marissa Vela    He c/o SOB (arthritic knees / back injury) but hasn't had to stop and take breaks. Again he attributes to better BP control / med schedule. He does become winded after carrying his dialysate from the garage to upstairs. He walks routinely with his finance and plays with his dogs. He's trying to build up his endurance. his angina (which was a twisting, knife sensation in his chest) of which he's not had. The patient denies orthopnea/PND. The patient does not have swelling. The patients weight was 189.8# down from hovering ~ 210#. He's doing better with his eating habits. The patient is not experiencing palpitations or dizziness. He plans to call the sleep clinic to finish his w/u - tx for his sleep apnea. He'd missed his fitting when he was sick with COVID    These symptoms are better since the last OV. With regard to medication therapy the patient has been compliant with prescribed regimen. They have tolerated therapy to date.      Past Medical History:   Diagnosis Date    [facility] rest [facility])     Cardiomyopathy ([facility] [address]     Carotid artery stenosis 10/25/2016    SUZANNA stented with 8 x 30 mm non-tapered Xact stent    CHF (congestive heart failure) ([facility] [address] 3/3/15    EF 30%     CKD (chronic kidney disease) stage 2, GFR 60-89 ml/min     Coronary artery disease     sp CABG UC    Diabetic peripheral neuropathy (HCC)     Diastolic HF (heart failure) ([facility])     Hyperlipidemia with target LDL less than 70     Hypertension goal BP (blood pressure) < 130/80     PVD (peripheral vascular disease) ([facility])     Seizures ([facility])     Type 1 diabetes mellitus with chronic kidney disease ([facility])     c-peptide <0.1 in 2015     Social History:    Social History     Tobacco Use   Smoking Status Never Smoker   Smokeless Tobacco Never Used     Current Medications:  Current Outpatient Medications   Medication Sig Dispense Refill    clopidogrel (PLAVIX) 75 MG tablet TAKE ONE TABLET BY MOUTH DAILY 90 tablet 2    cloNIDine (CATAPRES) 0.1 MG tablet Take 1 tablet by mouth 2 times daily 60 tablet 5    hydrALAZINE (APRESOLINE) 100 MG tablet Take 100 mg by mouth 3 times daily      carvedilol (COREG) 25 MG tablet Take 1 tablet by mouth 2 times daily 60 tablet 5    NOVOLOG 100 UNIT/ML injection vial INJECT 40 TO 50 UNITS UNDER THE SKIN DAILY VIA PUMP 10 mL 3    isosorbide mononitrate (IMDUR) 30 MG extended release tablet Take 30 mg by mouth daily      ezetimibe (ZETIA) 10 MG tablet TAKE ONE TABLET BY MOUTH DAILY 30 tablet 3    ranolazine (RANEXA) 500 MG extended release tablet TAKE ONE TABLET BY MOUTH TWICE A  tablet 1    aspirin 81 MG EC tablet Take 1 tablet by mouth daily 30 tablet 3    rosuvastatin (CRESTOR) 40 MG tablet Take 1 tablet by mouth nightly 30 tablet 3    Cholecalciferol (VITAMIN D3 ULTRA POTENCY) 1.25 MG (78960 UT) TABS Take 50,000 Units by mouth once a week (on Saturdays)      Methylcobalamin (B-12) 1000 MCG TBDP Place 1,000 mcg under the tongue daily      diphenhydrAMINE (BENADRYL ALLERGY) 25 MG capsule Take 25 mg by mouth every 8 hours x 3 doses (Patient not taking: Reported on 3/3/2022) 5 capsule 0    famotidine (PEPCID) 10 MG tablet Take 5 mg by mouth every 8 hours x 3 doses (Patient not taking: Reported on 3/3/2022) 5 tablet 0    predniSONE (DELTASONE) 20 MG tablet Take 40 mg by mouth every 8 hours x 3 doses (Patient not taking: Reported on 3/3/2022) 20 tablet 0    nitroGLYCERIN (NITROSTAT) 0.4 MG SL tablet DISSOLVE 1 TAB UNDER TONGUE FOR CHEST PAIN - IF PAIN REMAINS AFTER 5 MIN, CALL 911 AND REPEAT DOSE. MAX 3 TABS IN 15 MINUTES (Patient not taking: Reported on 3/3/2022) 25 tablet 2    BASAGLAR KWIKPEN 100 UNIT/ML injection pen INJECT 20 UNITS UNDER THE SKIN ONCE NIGHTLY WHEN OFF PUMP (Patient not taking: Reported on 3/3/2022) 5 pen 3    Continuous Blood Gluc Sensor (DEXCOM G6 SENSOR) MISC 1 Device by Does not apply route daily 1 each 0    blood glucose test strips (ACCU-CHEK GUIDE) strip TEST BLOOD SUGAR 6 TIMES A  strip 5     No current facility-administered medications for this visit. REVIEW OF SYSTEMS:    CONSTITUTIONAL: No major weight gain or loss, fatigue, weakness, night sweats or fever. HEENT: No new vision difficulties or ringing in the ears. RESPIRATORY: No new SOB, PND, orthopnea or cough. CARDIOVASCULAR: See HPI  GI: No nausea, vomiting, diarrhea, constipation, abdominal pain or changes in bowel habits. : No urinary frequency, urgency, incontinence hematuria or dysuria. SKIN: No cyanosis or skin lesions. MUSCULOSKELETAL: No new muscle or joint pain. NEUROLOGICAL: No syncope or TIA-like symptoms.   PSYCHIATRIC: No anxiety, pain, insomnia or depression  ~will be proposing to [facility] in the near future : wedding date is 6/25/22    Objective:   PHYSICAL EXAM:        Vitals:    03/03/22 1118 03/03/22 1149   BP: 100/60 120/70   Site: Left Upper Arm Right Upper Arm   Position: Sitting    Cuff Size: Large Adult Large Adult   Pulse: 68    SpO2: 99%    Weight: 198 lb (89.8 kg)    Height: 5' 7.5\" (1.715 m)        VITALS:  /60 (Site: Left Upper Arm, Position: Sitting, Cuff Size: Large Adult)   Pulse 68   Ht 5' 7.5\" (1.715 m)   Wt 198 lb (89.8 kg)   SpO2 99%   BMI 30.55 kg/m²   CONSTITUTIONAL: Cooperative, no apparent distress, and appears well nourished / developed  NEUROLOGIC:  Awake and orientated to person, place and time. PSYCH: Calm affect. SKIN: Warm and dry. HEENT: Sclera non-icteric, normocephalic, neck supple, no elevation of JVP, normal carotid pulses with + bruits bilaterally; thyroid normal size. LUNGS:  No increased work of breathing and clear to auscultation, no crackles or wheezing  CARDIOVASCULAR:  Regular rate with ectopy 80 and rhythm with no murmurs, gallops, rubs, or abnormal heart sounds, normal PMI. The apical impulses not displaced  JVP less than 8 cm H2O  Heart tones are crisp and normal  Cervical veins are not engorged  The carotid upstroke is normal in amplitude and contour without delay or bruit  JVP is not elevated  ABDOMEN:  Normal bowel sounds, non-distended and non-tender to palpation  EXT: distal kike LE edema, no calf tenderness. Pulses are present bilaterally.     DATA:    Lab Results   Component Value Date    ALT 27 12/08/2021    AST 42 (H) 12/08/2021    ALKPHOS 85 12/08/2021    BILITOT <0.2 12/08/2021     Lab Results   Component Value Date    CREATININE 5.1 (HH) 12/08/2021    BUN 39 (H) 12/08/2021     12/08/2021    K 3.9 12/08/2021     12/08/2021    CO2 18 (L) 12/08/2021     Lab Results   Component Value Date    TSH 1.60 05/01/2020     Lab Results   Component Value Date    WBC 2.2 (L) 12/08/2021    HGB 13.7 12/08/2021    HCT 42.3 12/08/2021    MCV 90.3 12/08/2021     (L) 12/08/2021     No components found for: CHLPL  Lab Results   Component Value Date    TRIG 85 03/02/2021    TRIG 69 05/16/2020    TRIG 75 07/25/2019     Lab Results   Component Value Date    HDL 40 03/02/2021    HDL 47 05/16/2020    HDL 45 07/25/2019     Lab Results   Component Value Date    LDLCALC 153 (H) 03/02/2021 LDLCALC 54 05/16/2020    LDLCALC 125 (H) 07/25/2019     Lab Results   Component Value Date    LABVLDL 17 03/02/2021    LABVLDL 14 05/16/2020    LABVLDL 15 07/25/2019     Radiology Review:  Pertinent images / reports were reviewed as a part of this visit and reveals the following:    Procedure: Oct '16:  Placement of carotid stent in the right internal carotid artery, 8 mm in  dimension.        Carotid US: 3/2/21:   Summary        The right internal carotid artery demonstrates a patent stent, appears to   [facility] a upper limits 50-69% diameter reducing stenosis based on velocity    criteria.    The left internal carotid artery appears to have a <50% diameter reducing    stenosis based on velocity criteria.    Vertebral flow are antegrade bilaterally.       myoview stress: 4/8/21:  Summary    -There is a moderate sized lateral completely reversible defect c/w    ischemia.    -LV function is mildly depressed with EF=45% and mild inferior lateral    hypokinesis.    -Chest pain this am and under camera.        Recommendation    Pt sent to ER for evaluation and probable cardiac cath in am         Cardiac cath: 4/9/21:  Findings:  Artery Findings/Result   LM Normal   % ostial   Cx 100% mid   % ostial   RCA Nondominant, Smaller RV marginal with mid 70% and distal 90% lesions supplying collateral to distal Cx (old finding), small but could potentially be stented if symptoms merit   S-OM Patent, 99% ostial with ABEL 1 flow   L-LAD patent   LVEDP 6   LVG 35%, inferior hk      Intervention:         PCI of SVG-OM1 with 3.5X23 Xience MARIIA (PD with 3.75NC throughout)     Post Cath Dx:       Severe disease as above, culprit SVG     Echo: 12/22/21:    Summary   -Normal left ventricle size and wall thickness.   -Overall left ventricular systolic function appears moderately reduced.   -Ejection fraction is visually estimated to be 35-40%. There appears to be   inferior hypokinesis.    -Grade II diastolic dysfunction with elevated LV filling pressures. -Mild mitral regurgitation is present.   -Trivial tricuspid regurgitation. Assessment:      Diagnosis Orders   1. Ischemic cardiomyopathy   ~LVEF improved to 35-40% by echo Dec '21  ~inf HK, EF 35% on cath from 4/9/21  ~moderate hypokinesis of the basal-mid septal, basal-mid lateral,   basal-mid inferior and basal-mid inferolateral walls; EF 30-35% by echo 3/2/21  ~HK inferior and lateral walls with echo from May '20, EF 40%  ~lasix / hydralazine-isosorbide / carvedilol   ~ volume up with LE edema     2. Coronary artery disease involving native coronary artery of native heart without angina pectoris . ~stable : denies angina  ~atypical chest discomfort improved with BP control  ~s/p CABG  ~s/p PTCA SVG > OM-1 April '21 ( small RV marginal branch (mid 70%, distal 90%) could be stented if symptoms persist > see cath note)  ~DAPT / nitrate / Alpha Haymaker / BB / statin    3. Mixed hyperlipidemia   ~LDL not at goal   ~crestor / zetia    4. HTN (hypertension), benign   ~improved / controlled with clonidine        I had the opportunity to review the clinical symptoms and presentation of [address] [place] [address]:     1. Lipid profile / LFTs as routine  2. F/U in six months     Overall the patient is stable from CV standpoint    I have addresed the patient's cardiac risk factors and adjusted pharmacologic treatment as needed. In addition, I have reinforced the need for patient directed risk factor modification. Further evaluation will be based upon the patient's clinical course and testing results. All questions and concerns were addressed to the patient Alternatives to my treatment were discussed. The patient is not currently smoking. The risks related to smoking were reviewed with the patient. Recommend maintaining a smoke-free lifestyle.      Patient is on a beta-blocker  Patient is not on an ace-i/ARB : CKD ; on hydralazine-isosorbide combination   Patient is on a statin    Dual Antiplatelet therapy has been recommended / prescribed for this patient. Education conducted on adverse reactions including bleeding was discussed. The patient verbalizes understanding not to stop medications without discussing with us. Discussed exercise: 30-60 minutes 7 days/week  Discussed Low saturated fat/RUBIA diet.      Thank you for allowing to us to participate in the care of JAYLEN Coker    Documentation of today's visit sent to PCP

## 2022-03-08 ENCOUNTER — TELEPHONE (OUTPATIENT)
Dept: CARDIOLOGY CLINIC | Age: 57
End: 2022-03-08

## 2022-03-08 NOTE — TELEPHONE ENCOUNTER
Diphenhydramine is to be taken as a premed (iodine allergy) for upcoming CTA. He does not take this regularly. Patient takes Clonidine 0.1 mg twice a day and gets drowsy during the day. He also has \"insomnia\" at night. His BP has been as low as 110/68 and felt okay with BP this low. In the past he has had SBP of 100 mmHg and did not feel great with BP this low. BP has been as high as 146/78-80's. Over the past two weeks, most SBP have been below 140 mmHg. DBP does not go above 90 mmHg. Will address with Dr. David Reyna.

## 2022-03-08 NOTE — TELEPHONE ENCOUNTER
Pt lm vm stating he is having issues with the cloNIDine it is making him drowsy, pt stated he is stopping it until hears from our office. FYI pt started diphenhydrAMINE (BENADRYL ALLERGY) 25 MG capsule   2/24/2022.     Pls advise thank you

## 2022-03-09 RX ORDER — CLONIDINE HYDROCHLORIDE 0.1 MG/1
0.1 TABLET ORAL 2 TIMES DAILY
COMMUNITY
End: 2022-08-09
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
## 2022-03-10 ENCOUNTER — HOSPITAL ENCOUNTER (OUTPATIENT)
Dept: CT IMAGING | Age: 57
Discharge: HOME | End: 2022-03-10
Payer: COMMERCIAL

## 2022-03-10 DIAGNOSIS — I65.21 STENOSIS OF RIGHT CAROTID ARTERY: ICD-10-CM

## 2022-03-10 PROCEDURE — 6360000004 HC RX CONTRAST MEDICATION: Performed by: INTERNAL MEDICINE

## 2022-03-10 PROCEDURE — 70496 CT ANGIOGRAPHY HEAD: CPT

## 2022-03-10 RX ADMIN — IOPAMIDOL 75 ML: 755 INJECTION, SOLUTION INTRAVENOUS at 07:37

## 2022-03-21 ENCOUNTER — TELEPHONE (OUTPATIENT)
Dept: CARDIOLOGY CLINIC | Age: 57
End: 2022-03-21

## 2022-03-21 DIAGNOSIS — I65.21 STENOSIS OF RIGHT CAROTID ARTERY: Primary | ICD-10-CM

## 2022-03-21 NOTE — LETTER
Yahir Lincoln  1965        Carotid Angiogram Procedure date: 5/11/22 at 8:30 am.   Arrival time: 7:00 am       The morning of your procedure you will park in the hospital parking lot and report directly to the cath lab to check in.     Pre-Procedure Instructions   1. You will need to fast for at least 8 hours prior to procedure. No caffeine the morning of.    2. Hold all diabetic medications including, Metfomin. If you take Lantus/Levemir only take ½ your normal dose the evening before. All other medications can be taken in the morning with sips of water. 3. Do not use any lotions, creams or perfume the morning of procedure. 4. Pre-procedure lab work will need to be completed 5-7 days prior to procedure. 5. Please have a responsible adult to drive you home after procedure. We advise you have someone to stay with you for 24 hours following procedure for precautionary measures. Depending on procedure you may require an overnight stay. 6. Cath lab will provide you with all post procedure instructions. If you have any questions regarding the procedure itself or medications, please call [phone] and ask to speak with a nurse.

## 2022-03-21 NOTE — Clinical Note
[address] HEART INST [facility]  Phone: [phone]  Fax: [phone]    Joey Reinoso MD        April 5, 2022    Asif Garay  [address] [place] [id number]      Dear [address] [place] [address]:    ***    If you have any questions or concerns, please don't hesitate to call.     Sincerely,        Joey Reinoso MD

## 2022-03-21 NOTE — TELEPHONE ENCOUNTER
Per result note patient needs to be scheduled for a carotid angiogram. Patient is aware and agreeable. Will call to schedule.

## 2022-03-25 DIAGNOSIS — I25.83 CORONARY ARTERY DISEASE DUE TO LIPID RICH PLAQUE: ICD-10-CM

## 2022-03-25 DIAGNOSIS — I25.10 CORONARY ARTERY DISEASE DUE TO LIPID RICH PLAQUE: ICD-10-CM

## 2022-03-28 RX ORDER — RANOLAZINE 500 MG/1
TABLET, EXTENDED RELEASE ORAL
Qty: 60 TABLET | Refills: 5 | Status: SHIPPED | OUTPATIENT
Start: 2022-03-28

## 2022-04-05 NOTE — TELEPHONE ENCOUNTER
Procedure date: 5/11/22 at 8:30 am.   Arrival time: 7:00 am       The morning of your procedure you will park in the hospital parking lot and report directly to the cath lab to check in.     Pre-Procedure Instructions   1. You will need to fast for at least 8 hours prior to procedure. No caffeine the morning of.    2. Hold all diabetic medications including, Metfomin. If you take Lantus/Levemir only take ½ your normal dose the evening before. All other medications can be taken in the morning with sips of water. 3. Do not use any lotions, creams or perfume the morning of procedure. 4. Pre-procedure lab work will need to be completed 5-7 days prior to procedure. 5. Please have a responsible adult to drive you home after procedure. We advise you have someone to stay with you for 24 hours following procedure for precautionary measures. Depending on procedure you may require an overnight stay. 6. Cath lab will provide you with all post procedure instructions. If you have any questions regarding the procedure itself or medications, please call [phone] and ask to speak with a nurse. Spoke to pt and gave date/time and pre-procedure instructions. Mailed to pts home. He v/u. Will need to discuss with Dr. Yue Carr about insulin pump. Published on [employer] and e-mail to [place].

## 2022-04-08 RX ORDER — HYDRALAZINE HYDROCHLORIDE 100 MG/1
TABLET, FILM COATED ORAL
Qty: 90 TABLET | Refills: 1 | Status: SHIPPED | OUTPATIENT
Start: 2022-04-08

## 2022-04-12 RX ORDER — NITROGLYCERIN 0.4 MG/1
TABLET SUBLINGUAL
Qty: 25 TABLET | Refills: 2 | Status: SHIPPED | OUTPATIENT
Start: 2022-04-12

## 2022-04-12 NOTE — TELEPHONE ENCOUNTER
Discussed insulin pump with patient. He says that normally he turns it down and will only take half his normal dose the evening prior.

## 2022-04-21 RX ORDER — EZETIMIBE 10 MG/1
TABLET ORAL
Qty: 30 TABLET | Refills: 0 | Status: SHIPPED | OUTPATIENT
Start: 2022-04-21 | End: 2022-05-23

## 2022-05-11 ENCOUNTER — HOSPITAL ENCOUNTER (INPATIENT)
Dept: CARDIAC CATH/INVASIVE PROCEDURES | Age: 57
LOS: 1 days | Discharge: HOME | DRG: 252 | End: 2022-05-12
Attending: INTERNAL MEDICINE | Admitting: INTERNAL MEDICINE
Payer: COMMERCIAL

## 2022-05-11 DIAGNOSIS — I65.21 STENOSIS OF RIGHT CAROTID ARTERY: ICD-10-CM

## 2022-05-11 LAB
ANION GAP SERPL CALCULATED.3IONS-SCNC: 14 MMOL/L (ref 3–16)
BUN BLDV-MCNC: 43 MG/DL (ref 7–20)
CALCIUM SERPL-MCNC: 8.7 MG/DL (ref 8.3–10.6)
CHLORIDE BLD-SCNC: 104 MMOL/L (ref 99–110)
CO2: 24 MMOL/L (ref 21–32)
CREAT SERPL-MCNC: 7.6 MG/DL (ref 0.9–1.3)
GFR AFRICAN AMERICAN: 9
GFR NON-AFRICAN AMERICAN: 7
GLUCOSE BLD-MCNC: 121 MG/DL (ref 70–99)
GLUCOSE BLD-MCNC: 211 MG/DL (ref 70–99)
GLUCOSE BLD-MCNC: 397 MG/DL (ref 70–99)
GLUCOSE BLD-MCNC: 428 MG/DL (ref 70–99)
GLUCOSE BLD-MCNC: 44 MG/DL (ref 70–99)
GLUCOSE BLD-MCNC: 479 MG/DL (ref 70–99)
GLUCOSE BLD-MCNC: 64 MG/DL (ref 70–99)
GLUCOSE BLD-MCNC: 77 MG/DL (ref 70–99)
HCT VFR BLD CALC: 45 % (ref 40.5–52.5)
HEMOGLOBIN: 14.7 G/DL (ref 13.5–17.5)
MCH RBC QN AUTO: 28.3 PG (ref 26–34)
MCHC RBC AUTO-ENTMCNC: 32.7 G/DL (ref 31–36)
MCV RBC AUTO: 86.5 FL (ref 80–100)
PDW BLD-RTO: 15.6 % (ref 12.4–15.4)
PERFORMED ON: ABNORMAL
PERFORMED ON: NORMAL
PLATELET # BLD: 141 K/UL (ref 135–450)
PMV BLD AUTO: 10 FL (ref 5–10.5)
POTASSIUM SERPL-SCNC: 4.1 MMOL/L (ref 3.5–5.1)
RBC # BLD: 5.2 M/UL (ref 4.2–5.9)
SODIUM BLD-SCNC: 142 MMOL/L (ref 136–145)
WBC # BLD: 4.4 K/UL (ref 4–11)

## 2022-05-11 PROCEDURE — 80048 BASIC METABOLIC PNL TOTAL CA: CPT

## 2022-05-11 PROCEDURE — 37246 TRLUML BALO ANGIOP 1ST ART: CPT | Performed by: INTERNAL MEDICINE

## 2022-05-11 PROCEDURE — 36223 PLACE CATH CAROTID/INOM ART: CPT | Performed by: INTERNAL MEDICINE

## 2022-05-11 PROCEDURE — B3161ZZ FLUOROSCOPY OF RIGHT INTERNAL CAROTID ARTERY USING LOW OSMOLAR CONTRAST: ICD-10-PCS | Performed by: INTERNAL MEDICINE

## 2022-05-11 PROCEDURE — 2580000003 HC RX 258: Performed by: INTERNAL MEDICINE

## 2022-05-11 PROCEDURE — 94761 N-INVAS EAR/PLS OXIMETRY MLT: CPT

## 2022-05-11 PROCEDURE — C1894 INTRO/SHEATH, NON-LASER: HCPCS

## 2022-05-11 PROCEDURE — 96365 THER/PROPH/DIAG IV INF INIT: CPT

## 2022-05-11 PROCEDURE — 7100000001 HC PACU RECOVERY - ADDTL 15 MIN

## 2022-05-11 PROCEDURE — B31N1ZZ FLUOROSCOPY OF OTHER UPPER ARTERIES USING LOW OSMOLAR CONTRAST: ICD-10-PCS | Performed by: INTERNAL MEDICINE

## 2022-05-11 PROCEDURE — 96366 THER/PROPH/DIAG IV INF ADDON: CPT

## 2022-05-11 PROCEDURE — C1725 CATH, TRANSLUMIN NON-LASER: HCPCS

## 2022-05-11 PROCEDURE — 37799 UNLISTED PX VASCULAR SURGERY: CPT

## 2022-05-11 PROCEDURE — 94760 N-INVAS EAR/PLS OXIMETRY 1: CPT

## 2022-05-11 PROCEDURE — 7100000000 HC PACU RECOVERY - FIRST 15 MIN

## 2022-05-11 PROCEDURE — 99153 MOD SED SAME PHYS/QHP EA: CPT

## 2022-05-11 PROCEDURE — 36222 PLACE CATH CAROTID/INOM ART: CPT

## 2022-05-11 PROCEDURE — 6370000000 HC RX 637 (ALT 250 FOR IP)

## 2022-05-11 PROCEDURE — 2100000000 HC CCU R&B

## 2022-05-11 PROCEDURE — 2500000003 HC RX 250 WO HCPCS

## 2022-05-11 PROCEDURE — 85027 COMPLETE CBC AUTOMATED: CPT

## 2022-05-11 PROCEDURE — 99152 MOD SED SAME PHYS/QHP 5/>YRS: CPT | Performed by: INTERNAL MEDICINE

## 2022-05-11 PROCEDURE — C1760 CLOSURE DEV, VASC: HCPCS

## 2022-05-11 PROCEDURE — 6370000000 HC RX 637 (ALT 250 FOR IP): Performed by: INTERNAL MEDICINE

## 2022-05-11 PROCEDURE — 6360000002 HC RX W HCPCS

## 2022-05-11 PROCEDURE — 99152 MOD SED SAME PHYS/QHP 5/>YRS: CPT

## 2022-05-11 PROCEDURE — 6360000004 HC RX CONTRAST MEDICATION: Performed by: INTERNAL MEDICINE

## 2022-05-11 PROCEDURE — 37246 TRLUML BALO ANGIOP 1ST ART: CPT

## 2022-05-11 PROCEDURE — C1769 GUIDE WIRE: HCPCS

## 2022-05-11 PROCEDURE — 3E1M39Z IRRIGATION OF PERITONEAL CAVITY USING DIALYSATE, PERCUTANEOUS APPROACH: ICD-10-PCS | Performed by: INTERNAL MEDICINE

## 2022-05-11 PROCEDURE — 2500000003 HC RX 250 WO HCPCS: Performed by: INTERNAL MEDICINE

## 2022-05-11 PROCEDURE — X2AH336 CEREBRAL EMBOLIC FILTRATION, EXTRACORPOREAL FLOW REVERSAL CIRCUIT FROM RIGHT COMMON CAROTID ARTERY, PERCUTANEOUS APPROACH, NEW TECHNOLOGY GROUP 6: ICD-10-PCS | Performed by: INTERNAL MEDICINE

## 2022-05-11 PROCEDURE — 037K3ZZ DILATION OF RIGHT INTERNAL CAROTID ARTERY, PERCUTANEOUS APPROACH: ICD-10-PCS | Performed by: INTERNAL MEDICINE

## 2022-05-11 PROCEDURE — B3131ZZ FLUOROSCOPY OF RIGHT COMMON CAROTID ARTERY USING LOW OSMOLAR CONTRAST: ICD-10-PCS | Performed by: INTERNAL MEDICINE

## 2022-05-11 PROCEDURE — C1884 EMBOLIZATION PROTECT SYST: HCPCS

## 2022-05-11 PROCEDURE — 2709999900 HC NON-CHARGEABLE SUPPLY

## 2022-05-11 PROCEDURE — G0378 HOSPITAL OBSERVATION PER HR: HCPCS

## 2022-05-11 RX ORDER — SODIUM CHLORIDE, SODIUM LACTATE, CALCIUM CHLORIDE, MAGNESIUM CHLORIDE AND DEXTROSE 2.5; 538; 448; 18.3; 5.08 G/100ML; MG/100ML; MG/100ML; MG/100ML; MG/100ML
3000 INJECTION, SOLUTION INTRAPERITONEAL NIGHTLY
Status: DISCONTINUED | OUTPATIENT
Start: 2022-05-11 | End: 2022-05-11

## 2022-05-11 RX ORDER — DIPHENHYDRAMINE HYDROCHLORIDE 50 MG/ML
25 INJECTION INTRAMUSCULAR; INTRAVENOUS ONCE
Status: DISCONTINUED | OUTPATIENT
Start: 2022-05-11 | End: 2022-05-12 | Stop reason: HOSPADM

## 2022-05-11 RX ORDER — SODIUM CHLORIDE, SODIUM LACTATE, CALCIUM CHLORIDE, MAGNESIUM CHLORIDE AND DEXTROSE 2.5; 538; 448; 18.3; 5.08 G/100ML; MG/100ML; MG/100ML; MG/100ML; MG/100ML
6000 INJECTION, SOLUTION INTRAPERITONEAL NIGHTLY
Status: DISCONTINUED | OUTPATIENT
Start: 2022-05-11 | End: 2022-05-11

## 2022-05-11 RX ORDER — ASPIRIN 325 MG
325 TABLET ORAL ONCE
Status: DISCONTINUED | OUTPATIENT
Start: 2022-05-11 | End: 2022-05-12 | Stop reason: HOSPADM

## 2022-05-11 RX ORDER — ASPIRIN 81 MG/1
81 TABLET ORAL DAILY
Status: DISCONTINUED | OUTPATIENT
Start: 2022-05-12 | End: 2022-05-12 | Stop reason: HOSPADM

## 2022-05-11 RX ORDER — CLONIDINE HYDROCHLORIDE 0.1 MG/1
0.1 TABLET ORAL NIGHTLY
Status: DISCONTINUED | OUTPATIENT
Start: 2022-05-11 | End: 2022-05-12 | Stop reason: HOSPADM

## 2022-05-11 RX ORDER — IODIXANOL 320 MG/ML
145 INJECTION, SOLUTION INTRAVASCULAR
Status: COMPLETED | OUTPATIENT
Start: 2022-05-11 | End: 2022-05-11

## 2022-05-11 RX ORDER — SODIUM CHLORIDE 0.9 % (FLUSH) 0.9 %
5-40 SYRINGE (ML) INJECTION EVERY 12 HOURS SCHEDULED
Status: DISCONTINUED | OUTPATIENT
Start: 2022-05-11 | End: 2022-05-12 | Stop reason: HOSPADM

## 2022-05-11 RX ORDER — SODIUM CHLORIDE 9 MG/ML
INJECTION, SOLUTION INTRAVENOUS PRN
Status: DISCONTINUED | OUTPATIENT
Start: 2022-05-11 | End: 2022-05-12 | Stop reason: HOSPADM

## 2022-05-11 RX ORDER — SODIUM CHLORIDE, SODIUM LACTATE, CALCIUM CHLORIDE, MAGNESIUM CHLORIDE AND DEXTROSE 2.5; 538; 448; 18.3; 5.08 G/100ML; MG/100ML; MG/100ML; MG/100ML; MG/100ML
6000 INJECTION, SOLUTION INTRAPERITONEAL NIGHTLY
Status: DISCONTINUED | OUTPATIENT
Start: 2022-05-11 | End: 2022-05-12 | Stop reason: HOSPADM

## 2022-05-11 RX ORDER — RANOLAZINE 500 MG/1
500 TABLET, EXTENDED RELEASE ORAL 2 TIMES DAILY
Status: DISCONTINUED | OUTPATIENT
Start: 2022-05-11 | End: 2022-05-12 | Stop reason: HOSPADM

## 2022-05-11 RX ORDER — ACETAMINOPHEN 325 MG/1
650 TABLET ORAL EVERY 4 HOURS PRN
Status: DISCONTINUED | OUTPATIENT
Start: 2022-05-11 | End: 2022-05-12 | Stop reason: HOSPADM

## 2022-05-11 RX ORDER — SODIUM CHLORIDE 0.9 % (FLUSH) 0.9 %
5-40 SYRINGE (ML) INJECTION PRN
Status: DISCONTINUED | OUTPATIENT
Start: 2022-05-11 | End: 2022-05-12 | Stop reason: HOSPADM

## 2022-05-11 RX ORDER — CLOPIDOGREL BISULFATE 75 MG/1
75 TABLET ORAL DAILY
Status: DISCONTINUED | OUTPATIENT
Start: 2022-05-12 | End: 2022-05-12 | Stop reason: HOSPADM

## 2022-05-11 RX ORDER — SODIUM CHLORIDE, SODIUM LACTATE, CALCIUM CHLORIDE, MAGNESIUM CHLORIDE AND DEXTROSE 2.5; 538; 448; 18.3; 5.08 G/100ML; MG/100ML; MG/100ML; MG/100ML; MG/100ML
3000 INJECTION, SOLUTION INTRAPERITONEAL NIGHTLY
Status: DISCONTINUED | OUTPATIENT
Start: 2022-05-11 | End: 2022-05-12 | Stop reason: HOSPADM

## 2022-05-11 RX ORDER — ONDANSETRON 2 MG/ML
4 INJECTION INTRAMUSCULAR; INTRAVENOUS EVERY 6 HOURS PRN
Status: DISCONTINUED | OUTPATIENT
Start: 2022-05-11 | End: 2022-05-12 | Stop reason: HOSPADM

## 2022-05-11 RX ORDER — GENTAMICIN SULFATE 1 MG/G
CREAM TOPICAL DAILY
Status: DISCONTINUED | OUTPATIENT
Start: 2022-05-11 | End: 2022-05-12 | Stop reason: HOSPADM

## 2022-05-11 RX ORDER — ISOSORBIDE MONONITRATE 30 MG/1
30 TABLET, EXTENDED RELEASE ORAL DAILY
Status: DISCONTINUED | OUTPATIENT
Start: 2022-05-11 | End: 2022-05-12 | Stop reason: HOSPADM

## 2022-05-11 RX ORDER — ROSUVASTATIN CALCIUM 40 MG/1
40 TABLET, COATED ORAL NIGHTLY
Status: DISCONTINUED | OUTPATIENT
Start: 2022-05-11 | End: 2022-05-12 | Stop reason: HOSPADM

## 2022-05-11 RX ORDER — CARVEDILOL 25 MG/1
25 TABLET ORAL 2 TIMES DAILY WITH MEALS
Status: DISCONTINUED | OUTPATIENT
Start: 2022-05-11 | End: 2022-05-12 | Stop reason: HOSPADM

## 2022-05-11 RX ORDER — METHYLPREDNISOLONE SODIUM SUCCINATE 125 MG/2ML
125 INJECTION, POWDER, LYOPHILIZED, FOR SOLUTION INTRAMUSCULAR; INTRAVENOUS ONCE
Status: DISCONTINUED | OUTPATIENT
Start: 2022-05-11 | End: 2022-05-12 | Stop reason: HOSPADM

## 2022-05-11 RX ADMIN — SODIUM CHLORIDE, SODIUM LACTATE, CALCIUM CHLORIDE, MAGNESIUM CHLORIDE AND DEXTROSE 3000 ML: 2.5; 538; 448; 18.3; 5.08 INJECTION, SOLUTION INTRAPERITONEAL at 22:27

## 2022-05-11 RX ADMIN — IODIXANOL 145 ML: 320 INJECTION, SOLUTION INTRAVASCULAR at 09:21

## 2022-05-11 RX ADMIN — SODIUM CHLORIDE, PRESERVATIVE FREE 10 ML: 5 INJECTION INTRAVENOUS at 10:26

## 2022-05-11 RX ADMIN — SODIUM CHLORIDE, PRESERVATIVE FREE 10 ML: 5 INJECTION INTRAVENOUS at 10:21

## 2022-05-11 RX ADMIN — ISOSORBIDE MONONITRATE 30 MG: 30 TABLET, EXTENDED RELEASE ORAL at 10:46

## 2022-05-11 RX ADMIN — CARVEDILOL 25 MG: 25 TABLET, FILM COATED ORAL at 10:46

## 2022-05-11 RX ADMIN — CLONIDINE HYDROCHLORIDE 0.1 MG: 0.1 TABLET ORAL at 20:17

## 2022-05-11 RX ADMIN — RANOLAZINE 500 MG: 500 TABLET, FILM COATED, EXTENDED RELEASE ORAL at 20:17

## 2022-05-11 RX ADMIN — SODIUM CHLORIDE 2.5 MG/HR: 9 INJECTION, SOLUTION INTRAVENOUS at 21:45

## 2022-05-11 RX ADMIN — ROSUVASTATIN CALCIUM 40 MG: 40 TABLET, FILM COATED ORAL at 20:17

## 2022-05-11 RX ADMIN — SODIUM CHLORIDE, PRESERVATIVE FREE 10 ML: 5 INJECTION INTRAVENOUS at 20:18

## 2022-05-11 RX ADMIN — CARVEDILOL 25 MG: 25 TABLET, FILM COATED ORAL at 16:55

## 2022-05-11 RX ADMIN — SODIUM CHLORIDE, SODIUM LACTATE, CALCIUM CHLORIDE, MAGNESIUM CHLORIDE AND DEXTROSE 6000 ML: 2.5; 538; 448; 18.3; 5.08 INJECTION, SOLUTION INTRAPERITONEAL at 22:27

## 2022-05-11 NOTE — PROGRESS NOTES
CVICU Charge RN reviewing chart for admission.     Electronically signed by Crissy Grant RN on 5/11/2022 at 9:12 AM

## 2022-05-11 NOTE — PROCEDURES
Moderate Sedation:  Start time: 0823  Stop time: 0910  1 mg versed   25 mcg fentanyl   An independent trained observer pushed medications at my direction. We monitored the patient's level of consciousness and vital signs/physiologic status throughout the procedure duration (see start and start times above).

## 2022-05-11 NOTE — CONSULTS
Nephrology ([facility]) Consult Note    Azucena Merlin is a 56 y.o. male whom we were asked to see for ESRD management. The patient presents for a scheduled carotid angiogram.  It shows severe right internal carotid stenosis s/p distal filter placement and angioplasty. Past Medical History:  ESRD on PD  HTN  HLP  DM1  PVD  CAD s/p PTCA s/p CABG    Review of System:  Otherwise unremarkable    Allergies: Iodides, Shellfish-derived products, and Atorvastatin calcium    Medications:  Current medications reviewed. Social History: no tobacco  Family Medical History:  Negative for kidney disase    Physical Exam:  Blood pressure (!) 157/86, pulse 83, temperature 96.7 °F (35.9 °C), temperature source Axillary, resp. rate 17, SpO2 99 %. General:  NAD, A+Ox3, ill-appearing, normal body habitus  HEENT:  PERRL, EOMI  Neck:  Supple, normal range of movement, thyroid unremarkable  Chest:  CTAB, good respiratory effort, good air movement  CV:  RRR, no murmurs or rubs, no carotid bruit, no abdominal bruits  Abdomen:  NTND, soft, +BS, no hepatosplenomegaly, PD catheter  Extremities:  No peripheral edema  Neurological:  Moving all four extremities, CN II-XII grossly intact  Lymphatics:  No palpable lymph nodes  Skin:  No rash, no jaundice  Psychiatric:  Normal insight and judgement, good recall    Laboratory Studies:  Lab Results   Component Value Date/Time     05/11/2022 07:30 AM    K 4.1 05/11/2022 07:30 AM    K 3.9 12/08/2021 05:19 AM     05/11/2022 07:30 AM    CO2 24 05/11/2022 07:30 AM    BUN 43 (H) 05/11/2022 07:30 AM    CREATININE 7.6 (HH) 05/11/2022 07:30 AM    CALCIUM 8.7 05/11/2022 07:30 AM    PHOS 4.3 04/10/2021 04:29 AM    MG 2.10 04/09/2021 10:08 AM     Lab Results   Component Value Date/Time    WBC 4.4 05/11/2022 07:30 AM    HGB 14.7 05/11/2022 07:30 AM    HCT 45.0 05/11/2022 07:30 AM     05/11/2022 07:30 AM       Assessment/Plan:  Reviewed old records and labs.     1) ESRD  - continue PD  - kidney transplant evaluation in progress    - last     2) s/p right carotid angioplasty  - per Dr. Alberto Hodges  - on dual platelet therapy    3) dental caries  - 95% done and just waiting for his final partial dentures to arrive    4) DM1  - check HgbA1c    5) HTN  - cardene gtt prn SBP > 140  - there could be a volume component, so will remove volume with 2.5% dianeal

## 2022-05-11 NOTE — PROGRESS NOTES
Pt recovered from cath lab post carotid angio. Pt presents diaphoretic and blood glucose in the 60s. Pt given juice and RN will recheck sugar in 15 min. ot puncture site is angio sealed closed and has no signs of hematoma or bleeding at this time, pt stroke assessment preformed at bedside with cath lab RN. Score of 0. Pt is alert and oriented on room air, pt arrives of 2mg/ hr Cardene drip MD orders for titration and turn off if pt is able to maintain BP less than 140 sbp. Cardene is titrated off BS is to be checked after handoff with CVU RN after pt finishes meal. Pt is in stable condition at this time.

## 2022-05-11 NOTE — H&P
CC: \"I am doing okay. \"     HPI:  The patient is 49 y.o. male with a past medical history significant for hypertension, hyperlipidemia, diabetes, type I-age 14, CKD, CHF, carotid stenosis, PVD, CAD s/p PCI, had recurrent in stent restenosis subsequently then underwent CABG (symptoms were sudden-SOB, unable to walk)     Today, he is here to discuss his carotid Doppler. He says that he has recently got an insulin pump and his last A1c is 9.1. He says that he is getting on the list to have a kidney and pancreas transplant. He does have dye allergy.  Patient denies exertional chest pain/pressure, dyspnea at rest, MOISE, PND, orthopnea, palpitations, lightheadedness, weight changes, changes in LE edema, and syncope.      Past Medical History        Past Medical History:   Diagnosis Date    Angina [facility] rest ([facility]      Cardiomyopathy ([facility]      Carotid artery stenosis 10/25/2016     SUZANNA stented with 8 x 30 mm non-tapered Xact stent    CHF (congestive heart failure) ([facility]) 3/3/15     EF 30%     CKD (chronic kidney disease) stage 2, GFR 60-89 ml/min      Coronary artery disease       sp CABG UC    Diabetic peripheral neuropathy ([facility])      Diastolic HF (heart failure) ([facility])      Hyperlipidemia with target LDL less than 70      Hypertension goal BP (blood pressure) < 130/80      PVD (peripheral vascular disease) ([facility])      Seizures ([facility])      Type 1 diabetes mellitus with chronic kidney disease ([facility])       c-peptide <0.1 in 2015         Past Surgical History   Past Surgical History:   Procedure Laterality Date    CARDIAC CATHETERIZATION        CARDIAC SURGERY         triple bypass    CAROTID STENT PLACEMENT Right      CORONARY ARTERY BYPASS GRAFT        HERNIA REPAIR        LAPAROSCOPY INSERTION PERITONEAL CATHETER N/A 6/3/2020     LAPAROSCOPIC PLACEMENT OF PERITONEAL DIALYSIS  CATHETER performed by Rashida Suarez MD at [facility]             Family History         Family History   Problem Relation Age of Onset    Coronary Art Dis Father      Diabetes Father      Coronary Art Dis Paternal Grandmother      Diabetes Paternal Grandmother      Hypertension Mother      Other Mother           Epilepsy    Other Sister           Thyroid disease  Heart murmur    Mult Sclerosis Brother      Arthritis Brother      Sleep Apnea Brother           Social History            Tobacco Use    Smoking status: Never Smoker    Smokeless tobacco: Never Used   Vaping Use    Vaping Use: Never used   Substance Use Topics    Alcohol use: Yes       Comment: one drink a month    Drug use: No              Allergies   Allergen Reactions    Iodides Anaphylaxis    Shellfish-Derived Products Anaphylaxis    Atorvastatin Calcium Rash      Current Facility-Administered Medications          Current Outpatient Medications   Medication Sig Dispense Refill    clopidogrel (PLAVIX) 75 MG tablet TAKE ONE TABLET BY MOUTH DAILY 90 tablet 2    cloNIDine (CATAPRES) 0.1 MG tablet Take 1 tablet by mouth 2 times daily 60 tablet 5    hydrALAZINE (APRESOLINE) 100 MG tablet Take 100 mg by mouth 3 times daily        carvedilol (COREG) 25 MG tablet Take 1 tablet by mouth 2 times daily 60 tablet 5    nitroGLYCERIN (NITROSTAT) 0.4 MG SL tablet DISSOLVE 1 TAB UNDER TONGUE FOR CHEST PAIN - IF PAIN REMAINS AFTER 5 MIN, CALL 911 AND REPEAT DOSE.  MAX 3 TABS IN 15 MINUTES 25 tablet 2    NOVOLOG 100 UNIT/ML injection vial INJECT 40 TO 50 UNITS UNDER THE SKIN DAILY VIA PUMP 10 mL 3    isosorbide mononitrate (IMDUR) 30 MG extended release tablet Take 30 mg by mouth daily        ezetimibe (ZETIA) 10 MG tablet TAKE ONE TABLET BY MOUTH DAILY 30 tablet 3    BASAGLAR KWIKPEN 100 UNIT/ML injection pen INJECT 20 UNITS UNDER THE SKIN ONCE NIGHTLY WHEN OFF PUMP 5 pen 3    ranolazine (RANEXA) 500 MG extended release tablet TAKE ONE TABLET BY MOUTH TWICE A  tablet 1    Continuous Blood Gluc Sensor (DEXCOM G6 SENSOR) MISC 1 Device by Does not apply route daily 1 each 0    aspirin 81 MG EC tablet Take 1 tablet by mouth daily 30 tablet 3    rosuvastatin (CRESTOR) 40 MG tablet Take 1 tablet by mouth nightly 30 tablet 3    Cholecalciferol (VITAMIN D3 ULTRA POTENCY) 1.25 MG (41324 UT) TABS Take 50,000 Units by mouth once a week (on Saturdays)        blood glucose test strips (ACCU-CHEK GUIDE) strip TEST BLOOD SUGAR 6 TIMES A  strip 5    Methylcobalamin (B-12) 1000 MCG TBDP Place 1,000 mcg under the tongue daily          No current facility-administered medications for this visit.            Review of Systems:     Constitutional: negative  Eyes: negative  Ears, nose, mouth, throat, and face: negative  Respiratory: negative  Cardiovascular: CP   Gastrointestinal: negative  Genitourinary:negative  Integument/breast: negative  Hematologic/lymphatic: negative  Musculoskeletal:negative  Neurological: negative  Behavioral/Psych: negative  Endocrine: negative  Allergic/Immunologic: negative      Physical Exam:   Vitals       Vitals:     02/24/22 1256   BP: 128/72   Pulse: 78   SpO2: 100%   Weight: 195 lb (88.5 kg)   Height: 5' 7.5\" (1.715 m)             Wt Readings from Last 3 Encounters:   02/24/22 195 lb (88.5 kg)   02/02/22 194 lb 6.4 oz (88.2 kg)   12/08/21 191 lb 12.8 oz (87 kg)         Constitutional: He is oriented to person, place, and time. He appears well-developed and well-nourished. In no acute distress.      Head: Normocephalic and atraumatic. Pupils equal and round.     Neck: Neck supple. No JVP or carotid bruit appreciated. No mass and no thyromegaly present. No lymphadenopathy present.     Cardiovascular: Normal rate. Normal heart sounds. Exam reveals no gallop and no friction rub. No murmur heard.     Pulmonary/Chest: Effort normal and breath sounds normal. No respiratory distress. He has no wheezes, rhonchi or rales.      Abdominal: Soft, non-tender.  Bowel sounds are normal. He exhibits no organomegaly, mass or bruit.      Extremities: No edema, cyanosis, or clubbing.      Pulses are 2+ radial/dorsalis pedis/posterior tibial/carotid bilaterally.     Neurological: Awake  alert and orientedNo gross cranial nerve deficit. Coordination normal.      Skin: Skin is warm and dry. There is no rash or diaphoresis.      Psychiatric: He has a normal mood and affect. His speech is normal and behavior is normal.      Lab Review:           Lab Results   Component Value Date     TRIG 85 03/02/2021     HDL 40 03/02/2021     LDLCALC 153 03/02/2021     LABVLDL 17 03/02/2021            Lab Results   Component Value Date     BUN 39 12/08/2021     CREATININE 5.1 12/08/2021         Image Review:      Carotid Duplex:7/24/2013 at [facility] [address]  1. Estimated diameter reduction of the right internal carotid artery is high end 50-69%. 2. Estimated diameter reduction of the left internal carotid artery is less than 50%. 3. The bilateral common and external carotid arteries reveal no significant stenosis. 4. There is greater than 50% stenosis of the right proximal vertebral artery. 5. There is normal antegrade flow in the left vertebral artery.     CATH:3/9/15 per Dr. Calli Ac at [facility]  Cath EF 35% with LVEDP 25. Findings c/w diastolic heart failure,acute on chronic  Patent LIMA to LAD,patent SVG to OM1 and OM2  Native left coronary occluded with occluded ostial LAD stent and mid CFX stent occluded. Native RCA nondominant with diffusely diseased acute marginal branch of RCA too small for intervention  Rec- will need diuretic and afterload reduction     Lower extremity arterial plethysmography:3/18/2015  Right Impression   Right CATHI: 0.94. This is consistent with no significant PAD at rest.   Right CATHI: 1.32. This is consistent with non diagnostic reading due to   calcific arteries most likely due to DM. Continuous wave Doppler is normal throughout the right lower extremity. Right pulse volume recordings are normal.   Left Impression   Left CATHI: 1.04.  This is consistent with no significant PAD at rest.   Left CATHI: 1.19. This is consistent with mild calcific vessel. Continuous wave Doppler is normal throughout the left lower extremity. Left pulse volume recordings are normal      ECHO:3/3/2015  Left ventricle size is normal. Normal left ventricular wall thickness. Left ventricular function is reduced with EF of 30%  Diffuse hypokinesis with more profound involvement of inferior wall. Mild mitral regurgitation is present.     Carotid Duplex:3/18/2015  Right Impression   The right internal carotid artery appears to have a 80-99% diameter reducing   stenosis based on velocity criteria, but cannot rule out total occlusion due   to visualization limitations. Consider other testing to quantify. The right external carotid artery appears to have elevated velocities. The right vertebral artery demonstrates normal antegrade flow.       Left Impression   The left internal carotid artery appears to have a 1-15% diameter reducing   stenosis based on velocity criteria. The left vertebral artery demonstrates normal antegrade flow. Plaque noted in the Left Bulb      Stress Myoview:5/18/2016  Summary   1. Technically a satisfactory study. 2. Non diagnostic treadmill exercise stress portion of the study because of   resting ST abnormalities   3. No evidence of Ischemia by Myocardial Perfusion Imaging. Basal   inferolateral scar   4. Gated Study shows dilated LV with EF of 36 %.      ECHO:5/18/16  Summary  Overall left ventricular systolic function is moderately reduced. Ejection fraction is visually estimated to be around 40 %. There is severe hypokinesis of the inferolateral wall and basal inferior wall. Normal left ventricular cavity size and wall thickness. Possible grade II diastolic dysfunction. The right ventricle is not well visualized. There is mild mitral regurgitation.   Trace aortic, tricuspid, and pulmonic regurgitation.     MRI BRAIN wo contrast:9/11/15  IMPRESSION: 1. No acute edema. 2. Microangiopathic changes present of the posterior trigone   bilaterally. This does not appear as acute infarcts on   diffusion-weighted images with ADC mapping. 3. Smooth mild narrowing of the distal left internal carotid   artery. 4. Dominant right vertebral.      Bilateral carotid angiography:10/5/15  ANGIOGRAPHIC FINDINGS:  1. The right common carotid artery comes from the right brachiocephalic trunk. It gives rise to right internal and external carotid arteries. The right internal carotid artery in the proximal portion has 90% stenosis present. It divides and gives rise to the right anterior cerebral artery and right middle cerebral artery and communicates via the anterior communicating artery with the left anterior cerebral artery. 2. The left common carotid artery comes from the aortic arch. It gives rise to the left internal and external carotid arteries. The left internal carotid artery _divides into_ left anterior cerebral artery and left middle cerebral artery and has mild plaque present. Distal left internal carotid artery has approximately 60% stenosis present. This was in the intracranial portion. SUMMARY: Severe right internal carotid stenosis of approximately 90% severity.     [facility] with bilateral selective carotid angiography:9/23/16  ANGIOGRAPHIC FINDINGS:  1. The left main coronary artery comes from the left coronary cusp, giving  rise to a left anterior descending artery, left circumflex artery. The left  anterior descending artery is occluded in the proximal portion. The left  circumflex is occluded in the mid portion. There is an obtuse marginal 1,  which is patent. 2. The right coronary artery is codominant, a small size. It gives rise to a  posterior descending artery and posterolateral branch. The posterior  descending part has around 80% stenosis present. 3. Saphenous vein graft to obtuse marginal 2 is patent.  There is another  limb to possible obtuse marginal 3, which is occluded. 4. The left internal mammary artery to left anterior descending artery is  patent with good flow to the left anterior descending without significant  stenosis. 5. Left ventricular end-diastolic pressure was 10. CAROTID CIRCULATION:  1. The right common carotid artery comes from brachiocephalic trunk, gives  rise to the right internal and external carotid arteries. 2. The right internal carotid artery has approximately 80-90% stenosis  present. 3. The left common carotid artery comes from aortic arch, giving rise to the  left internal and external carotid arteries, which are patent. The left  internal carotid artery _____ into the intracranial portion, has 50% stenosis  present. Both sides of anterior cerebral and middle cerebral arteries are  patent; the right anterior cerebral, right middle cerebral, left anterior  cerebral, left middle cerebral arteries are patent. IN SUMMARY:   1. Sever native coronary artery disease with occluded jump graft from  saphenous vein to obtuse marginal 3 occluded, with right posterior descending  artery mid portion having 80% stenosis. 2. The right internal carotid artery has 80-90% stenosis.     Carotid and cerebral angiogram:10/25/16  ANGIOGRAPHIC FINDINGS:   1. The right common carotid artery comes from brachiocephalic branch of the aorta; it divides into right internal and external carotid arteries. The right internal carotid artery has approximately 80% to 90% stenosis. The  right internal carotid artery divides into right anterior cerebral and right middle cerebral arteries. 2. We intervened on right internal carotid artery after we placed a proximal embolic protection device, Moma. We placed 1 carotid stent 8 mm in dimension from Xormis into the right internal carotid artery successfully.    3. There was a brief episode of loss of left arm function with slurring of speech, which probably was attributed to occlusion of the circulation of the right carotid arterial system with the embolic protection device. His   symptoms quickly resolved after the right carotid circulation was established.     Carotid Duplex:4/3/17  Right Impression   The right internal carotid artery appears to have a 1-15% diameter reducing   stenosis based on velocity criteria. There is a fully patent stent in the right internal carotid artery. There are elevated velocities in the right external carotid artery. The right vertebral artery demonstrates normal antegrade flow. Left Impression   The left internal carotid artery appears to have a 1-15% diameter reducing   stenosis based on velocity criteria. There are elevated velocities in the left internal carotid artery. The left vertebral artery demonstrates normal antegrade flow.      Stress test:4/3/17 (walking Lexiscan)   Summary    Technically a satisfactory study.    Non-diagnostic pharmacological stress portion of the study due to resting ST    segment depression.    Moderate sized reversible defect distal inferolateral wall involving LCX or    dominant RCA territory.    Gated study shows estimated EF of 47 .      Bilateral carotid Doppler 2/16/2022  The right internal carotid artery appears to have a greater than or equal to   70% diameter reducing stenosis based on carotid stent velocity criteria. The right vertebral artery demonstrates normal antegrade flow. The right subclavian artery is visualized and demonstrates multiphasic flow.       Spoke with Gildardo Bishop, Dr. Sofie Jacome nurse, hold patient until she speaks with   Dr. Jeramy Naik. Left   The left internal carotid artery appears to have a <50% diameter reducing   stenosis based on velocity criteria. The left external carotid artery appears to have >50% diameter reduction based   on velocity criteria. The left vertebral artery demonstrates normal antegrade flow. The left subclavian artery is visualized and demonstrates multiphasic flow.       Assessment/Plan:     Right Internl carotid stenosis:   Status post stenting 10/25/16  Carotid Duplex 4/3/17-carotid stent patent  Continue Asa, Plavix and statin therapy. Reviewed Doppler increased velocity within the stent suggest significant stenosis. Continue aggressive risk factor modification. Plan intervention     I had a detail discussion with the patient and the family members regarding the risk and benefit of the procedures. I explained to them the details of the procedure. I explained to them that the procedure will be performed using moderate sedation and local anaesthesia using lidocaine. I explained any risk of bleeding, perforation of the vessel, stroke, myocardial infarction or death. I have presented reasonable alternatives to the patient's proposed care, treatment, and services. The discussion I have done encompassed risks, benefits, and side effects related to the alternatives and the risks related to not receiving the proposed care, treatment, and services. The patient and the family members understood the risk and benefits and the details of procedure and would like to go ahead with the procedure. The patient gave informed consent.     Coronary artery disease  Asymptomatic. Continue Asa, Plavix, B-blocker and statin therapy.      Ischemic Cardiomyoipathy:   Appears compensated on exam.      Hypertension:    Controlled. Continue current medical management.      Chronic renal insufficiency:   Dr. Mandi Huffman managing      Diabetes: On insulin pump therapy  Managed per endocrinology-Dr. Parisa Hurst     Hyperlipidemia:   Continue statin therapy.      Dye allergy (No problems with contrast with recent cath)   Benadryl 25 mg, pepcid 10 mg and Prednisone 40 mg every 8 hours times 3 doses prior to cath due to iodine allergy         Thank you very much for allowing me to participate in the care of your patient.  Please do not hesitate to contact me if you have any questions.     Sincerely,     Hal Quiñonez MD, MPH        [address] [place] [address], [address] [place] [address]   Armand Sanchez [facility]  Ph: [phone]  Fax: [phone]

## 2022-05-11 NOTE — PROCEDURES
[address]                            CARDIAC CATHETERIZATION    PATIENT NAME: Abdiel Villalpando                      :        1965  MED REC NO:   [id number]                          ROOM:       [id number]  ACCOUNT NO:   [de-identified]                           ADMIT DATE: 2022  PROVIDER:     Emilia Wei MD    DATE OF PROCEDURE:  2022    PROCEDURE PERFORMED:  1. Right carotid, internal carotid and cerebral angiography. 2.  Placement of distal internal carotid artery filter. 3.  Balloon angioplasty of the right internal carotid artery. INDICATION FOR PROCEDURE:  Severe right internal carotid artery  stenosis. ASA is II. Mallampati score is II. No complication. Estimated blood loss less than 30 mL. PROCEDURE IN DETAIL:  Right common femoral artery was accessed after  prepping and draping and giving lidocaine. A 5-Wallisian sheath was  inserted. Moonfruitenstein catheter engaged the right common carotid artery. We performed right carotid, internal carotid and cerebral angiography. Then, we placed a Shuttle sheath, 6-Wallisian, in the right common carotid  artery. A filter was placed in the distal right internal carotid  artery. Balloon angioplasty was performed of the right internal carotid  in-stent restenosis using a 4-mm cutting balloon and then a 5-mm regular  balloon. After that, final angiography was performed with excellent  flow to the cerebral vasculature. All catheters, wires, sheaths were  removed. Angio-Seal was applied. No complication. ANGIOGRAPHY FINDINGS:  Right internal carotid artery has a stent present  which has 90% in-stent restenosis. Patent right anterior and middle  cerebral arteries. RECOMMENDATION:  1. Continue postop post cath care. 2.  Site care. 3.  Risk factor modification. 4.  Dual antiplatelet therapy.         Aramis Corea MD    D: 2022 9:26:01 T: 05/11/2022 11:59:55     [email or web address]  Job#: [id number]     Doc#: [id number]    CC:

## 2022-05-11 NOTE — PROGRESS NOTES
Consult to Dr. Lilia Meade with nephrology for PD was ordered and called. Per Amanda, pt is on the cycler for 8 hrs where he receives 3 cycles of 2300mls for a total volume of 6900mls.

## 2022-05-11 NOTE — CARE COORDINATION
Chart Reviewed. Attempted visit with pateint, he was sleeping and did not awaken to his name being called. Spoke with bedside RN who reports wife had to leave as she is flying to [facility] and his sister will be taking him home tomorrow. Will try again to see this patient.   [facility]     Case Management   [phone]    5/11/2022  1:39 PM

## 2022-05-11 NOTE — PROGRESS NOTES
Hospitalist consulted for management of patient's home insulin infusion pump and possible need for more coverage with sliding scale.

## 2022-05-12 VITALS
BODY MASS INDEX: 30.86 KG/M2 | SYSTOLIC BLOOD PRESSURE: 132 MMHG | TEMPERATURE: 98.5 F | RESPIRATION RATE: 17 BRPM | WEIGHT: 199.96 LBS | DIASTOLIC BLOOD PRESSURE: 58 MMHG | HEART RATE: 79 BPM | OXYGEN SATURATION: 100 %

## 2022-05-12 LAB
ANION GAP SERPL CALCULATED.3IONS-SCNC: 14 MMOL/L (ref 3–16)
ANION GAP SERPL CALCULATED.3IONS-SCNC: 15 MMOL/L (ref 3–16)
BUN BLDV-MCNC: 50 MG/DL (ref 7–20)
BUN BLDV-MCNC: 53 MG/DL (ref 7–20)
CALCIUM SERPL-MCNC: 8.3 MG/DL (ref 8.3–10.6)
CALCIUM SERPL-MCNC: 8.5 MG/DL (ref 8.3–10.6)
CHLORIDE BLD-SCNC: 95 MMOL/L (ref 99–110)
CHLORIDE BLD-SCNC: 96 MMOL/L (ref 99–110)
CO2: 19 MMOL/L (ref 21–32)
CO2: 20 MMOL/L (ref 21–32)
CREAT SERPL-MCNC: 7.2 MG/DL (ref 0.9–1.3)
CREAT SERPL-MCNC: 7.3 MG/DL (ref 0.9–1.3)
ESTIMATED AVERAGE GLUCOSE: 203 MG/DL
GFR AFRICAN AMERICAN: 10
GFR AFRICAN AMERICAN: 9
GFR NON-AFRICAN AMERICAN: 8
GFR NON-AFRICAN AMERICAN: 8
GLUCOSE BLD-MCNC: 251 MG/DL (ref 70–99)
GLUCOSE BLD-MCNC: 285 MG/DL (ref 70–99)
GLUCOSE BLD-MCNC: 315 MG/DL (ref 70–99)
GLUCOSE BLD-MCNC: 417 MG/DL (ref 70–99)
GLUCOSE BLD-MCNC: 451 MG/DL (ref 70–99)
GLUCOSE BLD-MCNC: 516 MG/DL (ref 70–99)
GLUCOSE BLD-MCNC: 527 MG/DL (ref 70–99)
GLUCOSE BLD-MCNC: 573 MG/DL (ref 70–99)
GLUCOSE BLD-MCNC: 638 MG/DL (ref 70–99)
HBA1C MFR BLD: 8.7 %
HCT VFR BLD CALC: 44.8 % (ref 40.5–52.5)
HEMOGLOBIN: 14.6 G/DL (ref 13.5–17.5)
MCH RBC QN AUTO: 28.6 PG (ref 26–34)
MCHC RBC AUTO-ENTMCNC: 32.5 G/DL (ref 31–36)
MCV RBC AUTO: 87.8 FL (ref 80–100)
PDW BLD-RTO: 16 % (ref 12.4–15.4)
PERFORMED ON: ABNORMAL
PLATELET # BLD: 127 K/UL (ref 135–450)
PMV BLD AUTO: 9.9 FL (ref 5–10.5)
POTASSIUM SERPL-SCNC: 4.7 MMOL/L (ref 3.5–5.1)
POTASSIUM SERPL-SCNC: 5.3 MMOL/L (ref 3.5–5.1)
RBC # BLD: 5.1 M/UL (ref 4.2–5.9)
SODIUM BLD-SCNC: 129 MMOL/L (ref 136–145)
SODIUM BLD-SCNC: 130 MMOL/L (ref 136–145)
WBC # BLD: 10.7 K/UL (ref 4–11)

## 2022-05-12 PROCEDURE — 6370000000 HC RX 637 (ALT 250 FOR IP): Performed by: INTERNAL MEDICINE

## 2022-05-12 PROCEDURE — 80048 BASIC METABOLIC PNL TOTAL CA: CPT

## 2022-05-12 PROCEDURE — G0378 HOSPITAL OBSERVATION PER HR: HCPCS

## 2022-05-12 PROCEDURE — 36415 COLL VENOUS BLD VENIPUNCTURE: CPT

## 2022-05-12 PROCEDURE — 2580000003 HC RX 258: Performed by: INTERNAL MEDICINE

## 2022-05-12 PROCEDURE — 94760 N-INVAS EAR/PLS OXIMETRY 1: CPT

## 2022-05-12 PROCEDURE — 83036 HEMOGLOBIN GLYCOSYLATED A1C: CPT

## 2022-05-12 PROCEDURE — 85027 COMPLETE CBC AUTOMATED: CPT

## 2022-05-12 PROCEDURE — 6370000000 HC RX 637 (ALT 250 FOR IP): Performed by: FAMILY MEDICINE

## 2022-05-12 PROCEDURE — 96366 THER/PROPH/DIAG IV INF ADDON: CPT

## 2022-05-12 PROCEDURE — 2500000003 HC RX 250 WO HCPCS: Performed by: INTERNAL MEDICINE

## 2022-05-12 RX ORDER — INSULIN LISPRO 100 [IU]/ML
12 INJECTION, SOLUTION INTRAVENOUS; SUBCUTANEOUS ONCE
Status: COMPLETED | OUTPATIENT
Start: 2022-05-12 | End: 2022-05-12

## 2022-05-12 RX ORDER — INSULIN LISPRO 100 [IU]/ML
10 INJECTION, SOLUTION INTRAVENOUS; SUBCUTANEOUS ONCE
Status: COMPLETED | OUTPATIENT
Start: 2022-05-12 | End: 2022-05-12

## 2022-05-12 RX ORDER — INSULIN LISPRO 100 [IU]/ML
4 INJECTION, SOLUTION INTRAVENOUS; SUBCUTANEOUS ONCE
Status: COMPLETED | OUTPATIENT
Start: 2022-05-12 | End: 2022-05-12

## 2022-05-12 RX ADMIN — CLOPIDOGREL BISULFATE 75 MG: 75 TABLET ORAL at 08:52

## 2022-05-12 RX ADMIN — INSULIN LISPRO 12 UNITS: 100 INJECTION, SOLUTION INTRAVENOUS; SUBCUTANEOUS at 05:36

## 2022-05-12 RX ADMIN — ASPIRIN 81 MG: 81 TABLET, COATED ORAL at 08:51

## 2022-05-12 RX ADMIN — INSULIN LISPRO 4 UNITS: 100 INJECTION, SOLUTION INTRAVENOUS; SUBCUTANEOUS at 12:15

## 2022-05-12 RX ADMIN — SODIUM CHLORIDE 5 MG/HR: 9 INJECTION, SOLUTION INTRAVENOUS at 02:38

## 2022-05-12 RX ADMIN — GENTAMICIN SULFATE: 1 CREAM TOPICAL at 08:59

## 2022-05-12 RX ADMIN — INSULIN LISPRO 12 UNITS: 100 INJECTION, SOLUTION INTRAVENOUS; SUBCUTANEOUS at 02:36

## 2022-05-12 RX ADMIN — SODIUM CHLORIDE, PRESERVATIVE FREE 10 ML: 5 INJECTION INTRAVENOUS at 08:53

## 2022-05-12 RX ADMIN — INSULIN LISPRO 10 UNITS: 100 INJECTION, SOLUTION INTRAVENOUS; SUBCUTANEOUS at 00:45

## 2022-05-12 RX ADMIN — CARVEDILOL 25 MG: 25 TABLET, FILM COATED ORAL at 08:52

## 2022-05-12 RX ADMIN — ISOSORBIDE MONONITRATE 30 MG: 30 TABLET, EXTENDED RELEASE ORAL at 08:51

## 2022-05-12 RX ADMIN — RANOLAZINE 500 MG: 500 TABLET, FILM COATED, EXTENDED RELEASE ORAL at 08:51

## 2022-05-12 NOTE — PROCEDURES
Patient's dialysis orders reviewed. Will continue outpatient orders of 8 hours with 3 exchanges of 2.3 liters/exchange and last bag drain. Will use 2.5% dianeal to remove volume, which should help with his BP. Will monitor for response.

## 2022-05-12 NOTE — PROGRESS NOTES
ACCU-Chek of  and patients insulin pump 260. At this time patient states and feels it is OK to monitor and treat with insulin pump. No need to message hsopitalist at this time.

## 2022-05-12 NOTE — PROGRESS NOTES
Patient had 11 beat run of V tach. Pt denies chest pain or shortness of breath. Pt converted to NSR with occasional PVCs with no intervention.

## 2022-05-12 NOTE — PROGRESS NOTES
Dr. Leonard Vega at bedside discussed blood glucose issues and insulin pump. OK per Dr. Leonard Vega to give patient 4 units one time dose of subQ insulin lispro and still discharge today. OK per hospitalist, cardiology, and nephrology for patient to discharge today.

## 2022-05-12 NOTE — FLOWSHEET NOTE
05/12/22 0500   Treatment Team Notification   Reason for Communication Medication concern  ()   Team Member Name Marianne Maria MD   Treatment Team Role Consulting Provider   Method of Communication Secure Message   Response See orders   Notification Time

## 2022-05-12 NOTE — PROGRESS NOTES
Patient's PIV removed and belongings packed. Patients sister at bedside and educational packed gone through at length with patient and patients sister. Educated on medications. Patient did not have any medication changes or new medications added. Activity restrictions discussed and follow up appointment. Phone numbers and when to call 911. All questions answered. Patient walked to car with sister and this RN carried patients heavy backpack. Signature form signed and placed in patients paper chart.

## 2022-05-12 NOTE — DISCHARGE SUMMARY
[facility] SUMMARY      Patient ID:  Kasandra Vergara  [id number] 72 y.o. 1965    Admit date: 5/11/2022    Discharge date:  may 12, 2022    Admitting Physician: Deirdre Harrison MD     Discharge MD: Deirdre Harrison MD     Admission Diagnoses: Stenosis of right carotid artery [I65.21]  Carotid stenosis, right [I65.21]    Discharge Diagnoses:   Patient Active Problem List   Diagnosis    DKA (diabetic ketoacidoses)    Coronary artery disease due to lipid rich plaque    DM type 1 (diabetes mellitus, type 1) ([address]    Hyperlipidemia    Acute kidney injury superimposed on chronic kidney disease ([address]    PNA (pneumonia)    CRI (chronic renal insufficiency) ([facility]    CHF (congestive heart failure) ([facility])    Hypoglycemia    Diastolic HF (heart failure) ([facility])    Dyslipidemia    CKD (chronic kidney disease) stage 2, GFR 60-89 ml/min    Type 1 diabetes mellitus with chronic kidney disease ([address]    Diabetic peripheral neuropathy ([address]    Carotid artery stenosis    PVD (peripheral vascular disease) ([address]    Diabetic nephropathy associated with type 1 diabetes mellitus ([address]    Chest pain    Stenosis of right carotid artery    Carotid artery calcification    H/O carotid angioplasty    Pure hypercholesterolemia    Abnormal cardiovascular stress test    Acute encephalopathy    Generalized idiopathic epilepsy, not intractable, without status epilepticus ([address]    Encephalopathy    NSTEMI (non-ST elevated myocardial infarction) ([address]    Essential hypertension    S/P CABG (coronary artery bypass graft)    Ischemic cardiomyopathy    Venous insufficiency of both lower extremities    Atrial fibrillation with rapid ventricular response ([facility])    Syncope    Stage 4 chronic kidney disease ([address]    NSVT (nonsustained ventricular tachycardia) ([address]    DKA, type 1, not at goal [facility])    Acute on chronic combined systolic and diastolic CHF (congestive heart failure) ([facility])    Left leg weakness  Arterial ischemic stroke, ICA, right, acute [facility]    DM (diabetes mellitus), type 2, uncontrolled with complications (HCC)    End-stage renal disease on peritoneal dialysis [facility]    Hypersomnia    Moderate obstructive sleep apnea    Snoring    Unstable angina ([facility]    ESRD on dialysis ([facility]    Fever due to COVID-19    Peritonitis associated with peritoneal dialysis ([facility]    Sepsis ([facility]    Dialysis-associated peritonitis (HCC)    Nausea and vomiting    Elevated procalcitonin    Peritoneal dialysis catheter in place [facility])    Abdominal pain    Poorly controlled type 1 diabetes mellitus ([facility]    Overweight (BMI 25.0-29. 9)    Carotid stenosis, right        Discharged Condition: good    Hospital Course: Kasandra Vergara was admitted for carotid intervention. At discharge,     Consults: IP CONSULT TO NEPHROLOGY  IP CONSULT TO HOSPITALIST    The patient was seen and examined. Notes reviewed. There were not complications over night. The patient is being seen for carotid stenosis.     Objective:     BP (!) 132/58   Pulse 84   Temp 98.5 °F (36.9 °C) (Oral)   Resp 17   Wt 199 lb 15.3 oz (90.7 kg)   SpO2 99%   BMI 30.86 kg/m²      Intake/Output Summary (Last 24 hours) at 5/12/2022 1109  Last data filed at 5/12/2022 7095  Gross per 24 hour   Intake 924.48 ml   Output --   Net 924.48 ml       Physical Exam:  General:  Awake, alert, NAD  Skin:  Warm and dry  Neck:  JVD<8, no bruit  Chest:  Clear to auscultation, no wheezes/rhonchi/rales  Cardiovascular:  RRR S1S2  Abdomen:  Soft, nontender, +bowel sounds  Extremities:  no edema    Significant Diagnostic Studies:     Echocardiography:     Stress test:     Labs:   Lab Results   Component Value Date    CREATININE 7.3 (HH) 05/12/2022    BUN 50 (H) 05/12/2022     (L) 05/12/2022    K 4.7 05/12/2022    CL 96 (L) 05/12/2022    CO2 19 (L) 05/12/2022      Lab Results   Component Value Date    WBC 10.7 05/12/2022    HGB 14.6 05/12/2022    HCT 44.8 05/12/2022    MCV 87.8 05/12/2022     (L) 05/12/2022      Lab Results   Component Value Date    INR 0.97 12/08/2021    PROTIME 11.0 12/08/2021    No results found for: BNP      Disposition: home    Patient Instructions:     MEDICATIONS ON DISCHARGE    Patient Instructions:      Medication List      CONTINUE taking these medications    Accu-Chek Guide strip  Generic drug: blood glucose test strips  TEST BLOOD SUGAR 6 TIMES A DAY     aspirin 81 MG EC tablet  Take 1 tablet by mouth daily     B-12 1000 MCG Tbdp     Basaglar KwikPen 100 UNIT/ML injection pen  Generic drug: insulin glargine  INJECT 20 UNITS UNDER THE SKIN ONCE NIGHTLY WHEN OFF PUMP     carvedilol 25 MG tablet  Commonly known as: COREG  Take 1 tablet by mouth 2 times daily     cloNIDine 0.1 MG tablet  Commonly known as: CATAPRES     clopidogrel 75 MG tablet  Commonly known as: PLAVIX  TAKE ONE TABLET BY MOUTH DAILY     Dexcom G6 Sensor Misc  1 Device by Does not apply route daily     diphenhydrAMINE 25 MG capsule  Commonly known as: Benadryl Allergy  Take 25 mg by mouth every 8 hours x 3 doses     ezetimibe 10 MG tablet  Commonly known as: ZETIA  TAKE ONE TABLET BY MOUTH DAILY     famotidine 10 MG tablet  Commonly known as: PEPCID  Take 5 mg by mouth every 8 hours x 3 doses     hydrALAZINE 100 MG tablet  Commonly known as: APRESOLINE  TAKE ONE TABLET BY MOUTH THREE TIMES A DAY     isosorbide mononitrate 30 MG extended release tablet  Commonly known as: IMDUR     nitroGLYCERIN 0.4 MG SL tablet  Commonly known as: NITROSTAT  DISSOLVE 1 TAB UNDER TONGUE FOR CHEST PAIN - IF PAIN REMAINS AFTER 5 MIN, CALL 911 AND REPEAT DOSE.  MAX 3 TABS IN 15 MINUTES     NovoLOG 100 UNIT/ML injection vial  Generic drug: insulin aspart  INJECT 40 TO 50 UNITS UNDER THE SKIN DAILY VIA PUMP     predniSONE 20 MG tablet  Commonly known as: DELTASONE  Take 40 mg by mouth every 8 hours x 3 doses     ranolazine 500 MG extended release tablet  Commonly known as: RANEXA  TAKE ONE TABLET BY MOUTH TWICE A DAY     rosuvastatin 40 MG tablet  Commonly known as: CRESTOR  Take 1 tablet by mouth nightly     Vitamin D3 Ultra Potency 1.25 MG (81880 UT) Tabs  Generic drug: Cholecalciferol            1. Overall the patient is stable from CV standpoint    2. Most recent cardiac testing has been reviewed as above. Further testing is required at this time. 3. The patient is not currently smoking. The risks related to smoking were reviewed with the patient. Recommend maintaining a smoke-free lifestyle. Products available for smoking cessation were discussed in detail. 4. Dual Antiplatelet therapy has been recommended / prescribed for this patient. Education conducted on adverse reactions including bleeding was discussed. The patient verbalizes understanding not to stop medications without discussing with us. 6. Cardiac diet and physical activity discussed    Follow-up with [facility] in 4 weeks. Recommend follow up with PCP in 3-4 weeks. Signed:   Jose Álvarez MD, MPH  Time spent on discharge of patient: 35 minutes

## 2022-05-12 NOTE — PROGRESS NOTES
PD set up per order. Pt's . Pt changed insulin pump site and administered an additional 6.75 units of insulin.

## 2022-05-12 NOTE — FLOWSHEET NOTE
05/12/22 0000   Treatment Team Notification   Reason for Communication Medication concern  ( )   Team Member Name Cony Nunn MD   Treatment Team Role Consulting Provider   Method of Communication Secure Message   Response See orders   Notification Time 6526

## 2022-05-12 NOTE — FLOWSHEET NOTE
05/12/22 0200   Treatment Team Notification   Reason for Communication Critical results   Type of Critical Result Laboratory   Critical Lab Result Type Glucose  (638)   Team Member Name Mercy Albert MD   Treatment Team Role Consulting Provider   Method of Communication Secure Message   Response See orders   Notification Time [phone]

## 2022-05-12 NOTE — PROGRESS NOTES
Nephrology ([facility] Progress Note    CC: ESRD management    Subjective:    HPI:  Breathing comfortably. No CP. BP better. ROS:  In bed. [address]:  medications reviewed. Objective:  Blood pressure (!) 132/58, pulse 84, temperature 98.5 °F (36.9 °C), temperature source Oral, resp. rate 17, weight 199 lb 15.3 oz (90.7 kg), SpO2 99 %. Intake/Output Summary (Last 24 hours) at 5/12/2022 1127  Last data filed at 5/12/2022 0657  Gross per 24 hour   Intake 474.48 ml   Output --   Net 474.48 ml     General:  NAD, A+Ox3  Chest:  CTAB  CVS:  RRR  Abdominal:  NTND, soft, +BS  Extremities:  no edema  Skin:  no rash    Labs:  Renal panel:  Lab Results   Component Value Date/Time     (L) 05/12/2022 04:34 AM    K 4.7 05/12/2022 04:34 AM    K 3.9 12/08/2021 05:19 AM    CO2 19 (L) 05/12/2022 04:34 AM    BUN 50 (H) 05/12/2022 04:34 AM    CREATININE 7.3 (HH) 05/12/2022 04:34 AM    CALCIUM 8.5 05/12/2022 04:34 AM    PHOS 4.3 04/10/2021 04:29 AM    MG 2.10 04/09/2021 10:08 AM     CBC:  Lab Results   Component Value Date/Time    WBC 10.7 05/12/2022 04:34 AM    HGB 14.6 05/12/2022 04:34 AM    HCT 44.8 05/12/2022 04:34 AM     (L) 05/12/2022 04:34 AM       Assessment/Plan:  Reviewed old records and labs. 1) ESRD  - continue PD  - kidney transplant evaluation in progress               - last      2) s/p right carotid angioplasty  - per Dr. Yuliana Sims  - on dual platelet therapy     3) dental caries  - 95% done and just waiting for his final partial dentures to arrive     4) DM1  - encouraged improved glycemic control     5) HTN  - better after volume removal    Patient can be discharged from renal standpoint.

## 2022-05-12 NOTE — PROGRESS NOTES
Patient calls RN into room d/t sugars trending up. Patient has personal insulin pump and there was trouble over night last night controlling blood sugars. Supplemental insulin was given by night shift RN several times to help bring patients blood glucose down. Patient requests this RN to ask for some supplemental insulin at this time after eating breakfast. Patient states BG is nwo 240 and trending up. This RN to check BG on accu-chek and then message hospitalist if needed for subQ.

## 2022-05-20 ENCOUNTER — HOSPITAL ENCOUNTER (INPATIENT)
Age: 57
LOS: 1 days | Discharge: HOME | DRG: 637 | End: 2022-05-21
Attending: EMERGENCY MEDICINE | Admitting: INTERNAL MEDICINE
Payer: COMMERCIAL

## 2022-05-20 ENCOUNTER — APPOINTMENT (OUTPATIENT)
Dept: GENERAL RADIOLOGY | Age: 57
DRG: 637 | End: 2022-05-20
Payer: COMMERCIAL

## 2022-05-20 ENCOUNTER — APPOINTMENT (OUTPATIENT)
Dept: CT IMAGING | Age: 57
DRG: 637 | End: 2022-05-20
Payer: COMMERCIAL

## 2022-05-20 DIAGNOSIS — R41.82 ALTERED MENTAL STATUS, UNSPECIFIED ALTERED MENTAL STATUS TYPE: Primary | ICD-10-CM

## 2022-05-20 DIAGNOSIS — T68.XXXA HYPOTHERMIA, INITIAL ENCOUNTER: ICD-10-CM

## 2022-05-20 LAB
ANION GAP SERPL CALCULATED.3IONS-SCNC: 13 MMOL/L (ref 3–16)
BASOPHILS ABSOLUTE: 0 K/UL (ref 0–0.2)
BASOPHILS RELATIVE PERCENT: 0.4 %
BUN BLDV-MCNC: 49 MG/DL (ref 7–20)
CALCIUM SERPL-MCNC: 8.6 MG/DL (ref 8.3–10.6)
CHLORIDE BLD-SCNC: 105 MMOL/L (ref 99–110)
CO2: 24 MMOL/L (ref 21–32)
CREAT SERPL-MCNC: 8.1 MG/DL (ref 0.9–1.3)
EKG ATRIAL RATE: 67 BPM
EKG DIAGNOSIS: NORMAL
EKG P AXIS: 71 DEGREES
EKG P-R INTERVAL: 156 MS
EKG Q-T INTERVAL: 448 MS
EKG QRS DURATION: 110 MS
EKG QTC CALCULATION (BAZETT): 473 MS
EKG R AXIS: 54 DEGREES
EKG T AXIS: 17 DEGREES
EKG VENTRICULAR RATE: 67 BPM
EOSINOPHILS ABSOLUTE: 0.2 K/UL (ref 0–0.6)
EOSINOPHILS RELATIVE PERCENT: 3.5 %
GFR AFRICAN AMERICAN: 8
GFR NON-AFRICAN AMERICAN: 7
GLUCOSE BLD-MCNC: 129 MG/DL (ref 70–99)
GLUCOSE BLD-MCNC: 148 MG/DL (ref 70–99)
GLUCOSE BLD-MCNC: 213 MG/DL (ref 70–99)
GLUCOSE BLD-MCNC: 345 MG/DL (ref 70–99)
GLUCOSE BLD-MCNC: 436 MG/DL (ref 70–99)
HCT VFR BLD CALC: 41.6 % (ref 40.5–52.5)
HEMOGLOBIN: 13.4 G/DL (ref 13.5–17.5)
LACTIC ACID, SEPSIS: 1.7 MMOL/L (ref 0.4–1.9)
LYMPHOCYTES ABSOLUTE: 1.4 K/UL (ref 1–5.1)
LYMPHOCYTES RELATIVE PERCENT: 29 %
MCH RBC QN AUTO: 28.3 PG (ref 26–34)
MCHC RBC AUTO-ENTMCNC: 32.1 G/DL (ref 31–36)
MCV RBC AUTO: 88.1 FL (ref 80–100)
MONOCYTES ABSOLUTE: 0.7 K/UL (ref 0–1.3)
MONOCYTES RELATIVE PERCENT: 15.1 %
NEUTROPHILS ABSOLUTE: 2.4 K/UL (ref 1.7–7.7)
NEUTROPHILS RELATIVE PERCENT: 52 %
PDW BLD-RTO: 15.4 % (ref 12.4–15.4)
PERFORMED ON: ABNORMAL
PLATELET # BLD: 123 K/UL (ref 135–450)
PMV BLD AUTO: 11.2 FL (ref 5–10.5)
POTASSIUM REFLEX MAGNESIUM: 4.4 MMOL/L (ref 3.5–5.1)
RBC # BLD: 4.72 M/UL (ref 4.2–5.9)
SODIUM BLD-SCNC: 142 MMOL/L (ref 136–145)
TROPONIN: 0.06 NG/ML
TROPONIN: 0.08 NG/ML
WBC # BLD: 4.6 K/UL (ref 4–11)

## 2022-05-20 PROCEDURE — 80048 BASIC METABOLIC PNL TOTAL CA: CPT

## 2022-05-20 PROCEDURE — 6370000000 HC RX 637 (ALT 250 FOR IP): Performed by: EMERGENCY MEDICINE

## 2022-05-20 PROCEDURE — 71045 X-RAY EXAM CHEST 1 VIEW: CPT

## 2022-05-20 PROCEDURE — 6370000000 HC RX 637 (ALT 250 FOR IP): Performed by: INTERNAL MEDICINE

## 2022-05-20 PROCEDURE — 90945 DIALYSIS ONE EVALUATION: CPT

## 2022-05-20 PROCEDURE — 83605 ASSAY OF LACTIC ACID: CPT

## 2022-05-20 PROCEDURE — 87040 BLOOD CULTURE FOR BACTERIA: CPT

## 2022-05-20 PROCEDURE — 85025 COMPLETE CBC W/AUTO DIFF WBC: CPT

## 2022-05-20 PROCEDURE — 70450 CT HEAD/BRAIN W/O DYE: CPT

## 2022-05-20 PROCEDURE — G0378 HOSPITAL OBSERVATION PER HR: HCPCS

## 2022-05-20 PROCEDURE — 99285 EMERGENCY DEPT VISIT HI MDM: CPT

## 2022-05-20 PROCEDURE — 36415 COLL VENOUS BLD VENIPUNCTURE: CPT

## 2022-05-20 PROCEDURE — 6360000002 HC RX W HCPCS: Performed by: INTERNAL MEDICINE

## 2022-05-20 PROCEDURE — 93010 ELECTROCARDIOGRAM REPORT: CPT | Performed by: INTERNAL MEDICINE

## 2022-05-20 PROCEDURE — 93005 ELECTROCARDIOGRAM TRACING: CPT | Performed by: EMERGENCY MEDICINE

## 2022-05-20 PROCEDURE — 84484 ASSAY OF TROPONIN QUANT: CPT

## 2022-05-20 PROCEDURE — 84443 ASSAY THYROID STIM HORMONE: CPT

## 2022-05-20 PROCEDURE — 3E1M39Z IRRIGATION OF PERITONEAL CAVITY USING DIALYSATE, PERCUTANEOUS APPROACH: ICD-10-PCS | Performed by: INTERNAL MEDICINE

## 2022-05-20 PROCEDURE — 2580000003 HC RX 258: Performed by: INTERNAL MEDICINE

## 2022-05-20 PROCEDURE — 2140000000 HC CCU INTERMEDIATE R&B

## 2022-05-20 PROCEDURE — 96372 THER/PROPH/DIAG INJ SC/IM: CPT

## 2022-05-20 RX ORDER — HYDRALAZINE HYDROCHLORIDE 100 MG/1
100 TABLET, FILM COATED ORAL EVERY 8 HOURS SCHEDULED
Status: DISCONTINUED | OUTPATIENT
Start: 2022-05-20 | End: 2022-05-21 | Stop reason: HOSPADM

## 2022-05-20 RX ORDER — ONDANSETRON 4 MG/1
4 TABLET, ORALLY DISINTEGRATING ORAL EVERY 8 HOURS PRN
Status: DISCONTINUED | OUTPATIENT
Start: 2022-05-20 | End: 2022-05-21 | Stop reason: HOSPADM

## 2022-05-20 RX ORDER — ACETAMINOPHEN 325 MG/1
650 TABLET ORAL EVERY 6 HOURS PRN
Status: DISCONTINUED | OUTPATIENT
Start: 2022-05-20 | End: 2022-05-21 | Stop reason: HOSPADM

## 2022-05-20 RX ORDER — ASPIRIN 81 MG/1
81 TABLET ORAL DAILY
Status: DISCONTINUED | OUTPATIENT
Start: 2022-05-21 | End: 2022-05-21 | Stop reason: HOSPADM

## 2022-05-20 RX ORDER — RANOLAZINE 500 MG/1
500 TABLET, EXTENDED RELEASE ORAL 2 TIMES DAILY
Status: DISCONTINUED | OUTPATIENT
Start: 2022-05-20 | End: 2022-05-21 | Stop reason: HOSPADM

## 2022-05-20 RX ORDER — CALCITRIOL 0.25 UG/1
0.5 CAPSULE, LIQUID FILLED ORAL DAILY
COMMUNITY

## 2022-05-20 RX ORDER — INSULIN LISPRO 100 [IU]/ML
0-6 INJECTION, SOLUTION INTRAVENOUS; SUBCUTANEOUS
Status: DISCONTINUED | OUTPATIENT
Start: 2022-05-20 | End: 2022-05-21 | Stop reason: ALTCHOICE

## 2022-05-20 RX ORDER — EZETIMIBE 10 MG/1
1 TABLET ORAL DAILY
Status: DISCONTINUED | OUTPATIENT
Start: 2022-05-20 | End: 2022-05-20 | Stop reason: RX

## 2022-05-20 RX ORDER — INSULIN LISPRO 100 [IU]/ML
0-3 INJECTION, SOLUTION INTRAVENOUS; SUBCUTANEOUS NIGHTLY
Status: DISCONTINUED | OUTPATIENT
Start: 2022-05-20 | End: 2022-05-21 | Stop reason: ALTCHOICE

## 2022-05-20 RX ORDER — GENTAMICIN SULFATE 1 MG/G
CREAM TOPICAL DAILY
Status: DISCONTINUED | OUTPATIENT
Start: 2022-05-20 | End: 2022-05-21 | Stop reason: HOSPADM

## 2022-05-20 RX ORDER — ENOXAPARIN SODIUM 100 MG/ML
40 INJECTION SUBCUTANEOUS DAILY
Status: DISCONTINUED | OUTPATIENT
Start: 2022-05-20 | End: 2022-05-20

## 2022-05-20 RX ORDER — SODIUM CHLORIDE 9 MG/ML
INJECTION, SOLUTION INTRAVENOUS PRN
Status: DISCONTINUED | OUTPATIENT
Start: 2022-05-20 | End: 2022-05-21 | Stop reason: HOSPADM

## 2022-05-20 RX ORDER — ISOSORBIDE MONONITRATE 30 MG/1
30 TABLET, EXTENDED RELEASE ORAL DAILY
Status: DISCONTINUED | OUTPATIENT
Start: 2022-05-20 | End: 2022-05-21 | Stop reason: HOSPADM

## 2022-05-20 RX ORDER — ASPIRIN 81 MG/1
324 TABLET, CHEWABLE ORAL ONCE
Status: COMPLETED | OUTPATIENT
Start: 2022-05-20 | End: 2022-05-20

## 2022-05-20 RX ORDER — SODIUM CHLORIDE 0.9 % (FLUSH) 0.9 %
5-40 SYRINGE (ML) INJECTION PRN
Status: DISCONTINUED | OUTPATIENT
Start: 2022-05-20 | End: 2022-05-21 | Stop reason: HOSPADM

## 2022-05-20 RX ORDER — CLONIDINE HYDROCHLORIDE 0.1 MG/1
0.1 TABLET ORAL 2 TIMES DAILY
Status: DISCONTINUED | OUTPATIENT
Start: 2022-05-20 | End: 2022-05-21 | Stop reason: HOSPADM

## 2022-05-20 RX ORDER — CLOPIDOGREL BISULFATE 75 MG/1
75 TABLET ORAL DAILY
Status: DISCONTINUED | OUTPATIENT
Start: 2022-05-20 | End: 2022-05-21 | Stop reason: HOSPADM

## 2022-05-20 RX ORDER — ROSUVASTATIN CALCIUM 40 MG/1
40 TABLET, COATED ORAL NIGHTLY
Status: DISCONTINUED | OUTPATIENT
Start: 2022-05-20 | End: 2022-05-21 | Stop reason: HOSPADM

## 2022-05-20 RX ORDER — FUROSEMIDE 40 MG/1
80 TABLET ORAL 2 TIMES DAILY
Status: DISCONTINUED | OUTPATIENT
Start: 2022-05-20 | End: 2022-05-20

## 2022-05-20 RX ORDER — ACETAMINOPHEN 650 MG/1
650 SUPPOSITORY RECTAL EVERY 6 HOURS PRN
Status: DISCONTINUED | OUTPATIENT
Start: 2022-05-20 | End: 2022-05-21 | Stop reason: HOSPADM

## 2022-05-20 RX ORDER — CARVEDILOL 25 MG/1
25 TABLET ORAL 2 TIMES DAILY
Status: DISCONTINUED | OUTPATIENT
Start: 2022-05-20 | End: 2022-05-21 | Stop reason: HOSPADM

## 2022-05-20 RX ORDER — HEPARIN SODIUM 5000 [USP'U]/ML
5000 INJECTION, SOLUTION INTRAVENOUS; SUBCUTANEOUS EVERY 8 HOURS SCHEDULED
Status: DISCONTINUED | OUTPATIENT
Start: 2022-05-20 | End: 2022-05-21 | Stop reason: HOSPADM

## 2022-05-20 RX ORDER — DEXTROSE MONOHYDRATE 50 MG/ML
100 INJECTION, SOLUTION INTRAVENOUS PRN
Status: DISCONTINUED | OUTPATIENT
Start: 2022-05-20 | End: 2022-05-21 | Stop reason: HOSPADM

## 2022-05-20 RX ORDER — FUROSEMIDE 80 MG
80 TABLET ORAL 2 TIMES DAILY
COMMUNITY

## 2022-05-20 RX ORDER — INSULIN GLARGINE 100 [IU]/ML
6 INJECTION, SOLUTION SUBCUTANEOUS NIGHTLY
Status: DISCONTINUED | OUTPATIENT
Start: 2022-05-20 | End: 2022-05-21

## 2022-05-20 RX ORDER — SACUBITRIL AND VALSARTAN 24; 26 MG/1; MG/1
1 TABLET, FILM COATED ORAL 2 TIMES DAILY
COMMUNITY

## 2022-05-20 RX ORDER — ONDANSETRON 2 MG/ML
4 INJECTION INTRAMUSCULAR; INTRAVENOUS EVERY 6 HOURS PRN
Status: DISCONTINUED | OUTPATIENT
Start: 2022-05-20 | End: 2022-05-21 | Stop reason: HOSPADM

## 2022-05-20 RX ORDER — POLYETHYLENE GLYCOL 3350 17 G/17G
17 POWDER, FOR SOLUTION ORAL DAILY PRN
Status: DISCONTINUED | OUTPATIENT
Start: 2022-05-20 | End: 2022-05-21 | Stop reason: HOSPADM

## 2022-05-20 RX ORDER — SODIUM CHLORIDE 0.9 % (FLUSH) 0.9 %
5-40 SYRINGE (ML) INJECTION EVERY 12 HOURS SCHEDULED
Status: DISCONTINUED | OUTPATIENT
Start: 2022-05-20 | End: 2022-05-21 | Stop reason: HOSPADM

## 2022-05-20 RX ADMIN — CLONIDINE HYDROCHLORIDE 0.1 MG: 0.1 TABLET ORAL at 20:30

## 2022-05-20 RX ADMIN — CARVEDILOL 25 MG: 25 TABLET, FILM COATED ORAL at 11:45

## 2022-05-20 RX ADMIN — RANOLAZINE 500 MG: 500 TABLET, FILM COATED, EXTENDED RELEASE ORAL at 11:45

## 2022-05-20 RX ADMIN — INSULIN LISPRO 4 UNITS: 100 INJECTION, SOLUTION INTRAVENOUS; SUBCUTANEOUS at 16:56

## 2022-05-20 RX ADMIN — SODIUM CHLORIDE, PRESERVATIVE FREE 10 ML: 5 INJECTION INTRAVENOUS at 11:46

## 2022-05-20 RX ADMIN — HEPARIN SODIUM 5000 UNITS: 5000 INJECTION INTRAVENOUS; SUBCUTANEOUS at 14:19

## 2022-05-20 RX ADMIN — HYDRALAZINE HYDROCHLORIDE 100 MG: 100 TABLET, FILM COATED ORAL at 20:29

## 2022-05-20 RX ADMIN — CLONIDINE HYDROCHLORIDE 0.1 MG: 0.1 TABLET ORAL at 11:45

## 2022-05-20 RX ADMIN — HEPARIN SODIUM 5000 UNITS: 5000 INJECTION INTRAVENOUS; SUBCUTANEOUS at 20:33

## 2022-05-20 RX ADMIN — ISOSORBIDE MONONITRATE 30 MG: 30 TABLET, EXTENDED RELEASE ORAL at 11:46

## 2022-05-20 RX ADMIN — INSULIN LISPRO 3 UNITS: 100 INJECTION, SOLUTION INTRAVENOUS; SUBCUTANEOUS at 20:31

## 2022-05-20 RX ADMIN — ROSUVASTATIN CALCIUM 40 MG: 40 TABLET, COATED ORAL at 20:29

## 2022-05-20 RX ADMIN — GENTAMICIN SULFATE: 1 CREAM TOPICAL at 21:10

## 2022-05-20 RX ADMIN — CLOPIDOGREL BISULFATE 75 MG: 75 TABLET ORAL at 11:46

## 2022-05-20 RX ADMIN — ASPIRIN 81 MG 324 MG: 81 TABLET ORAL at 07:20

## 2022-05-20 RX ADMIN — CARVEDILOL 25 MG: 25 TABLET, FILM COATED ORAL at 20:29

## 2022-05-20 RX ADMIN — INSULIN GLARGINE 6 UNITS: 100 INJECTION, SOLUTION SUBCUTANEOUS at 20:31

## 2022-05-20 RX ADMIN — RANOLAZINE 500 MG: 500 TABLET, FILM COATED, EXTENDED RELEASE ORAL at 20:29

## 2022-05-20 ASSESSMENT — PAIN SCALES - GENERAL
PAINLEVEL_OUTOF10: 0

## 2022-05-20 NOTE — ED NOTES
Pt's clothing removed and currently laying naked in the bed. Warm blankets wrapped around Pt to increase body temperature.      Franck Beasley RN  05/20/22 9971

## 2022-05-20 NOTE — H&P
Hospital Medicine History & Physical      PCP: No primary care provider on file. Date of Admission: 5/20/2022    Date of Service: Pt seen/examined on 5/20/2022  and Admitted to Inpatient with expected LOS greater than two midnights due to medical therapy. Chief Complaint: Encephalopathy, hypoglycemia, hypothermia      History Of Present Illness:     56 y.o. male with a history of cardiomyopathy, carotid artery stenosis status post right internal carotid artery, coronary artery disease status post CABG, ESRD-peritoneal dialysis dependent, systolic cardiomyopathy, hypertension, hyperlipidemia, diabetes mellitus type 1-insulin pump dependent had right internal carotid artery angioplasty done at [facility] [place] and was discharged on 5/12/2022. He was given preprocedural steroids due to contrast allergy. His blood sugars has been high while in the hospital but were reasonably controlled by the time of discharge. Soon after discharge at home his blood sugars have been running high into 400s. He changed insulin pump sites few times to make sure pump is working but sugars continue to be on the high side. Yesterday he noted his insulin pump was dislodged and insulin has been leaking down the leg. He went home and fixed his pump and took a bolus for high blood sugars. After that he went to sleep late in the night after watching movie. .  Around 4 AM this morning, he was sweaty and was slightly confused. Sugar was noted to be 71 . He wears Dexcom for blood sugar checks. He was given orange juice and paramedics was called as patient continues to be confused. He was brought in here for evaluation. In the ER his blood sugars were found to be 129. He was hypothermic at 93.3. Within few hours his temperature improved to 96.2. Blood sugars currently in 200s. Apparently in the room he found his insulin pump ran out of battery and is not working.     Patient denies any chest pain dizziness, palpitations, sweating at this time. Past Medical History:          Diagnosis Date    [facility]ina [facility] rest ([facility]     Cardiomyopathy ([facility]     Carotid artery stenosis 10/25/2016    SUZANNA stented with 8 x 30 mm non-tapered Xact stent    CHF (congestive heart failure) ([facility] 3/3/15    EF 30%     CKD (chronic kidney disease) stage 2, GFR 60-89 ml/min     Coronary artery disease     sp CABG UC    Diabetic peripheral neuropathy ([facility]     Diastolic HF (heart failure) ([facility])     Hyperlipidemia with target LDL less than 70     Hypertension goal BP (blood pressure) < 130/80     PVD (peripheral vascular disease) ([facility])     Seizures ([facility])     Type 1 diabetes mellitus with chronic kidney disease ([facility]     c-peptide <0.1 in 2015       Past Surgical History:          Procedure Laterality Date    CARDIAC CATHETERIZATION      CARDIAC SURGERY      triple bypass    CAROTID STENT PLACEMENT Right     CORONARY ARTERY BYPASS GRAFT      HERNIA REPAIR      LAPAROSCOPY INSERTION PERITONEAL CATHETER N/A 6/3/2020    LAPAROSCOPIC PLACEMENT OF PERITONEAL DIALYSIS  CATHETER performed by Von Lopes MD at [facility]         Medications Prior to Admission:      Prior to Admission medications    Medication Sig Start Date End Date Taking? Authorizing Provider   ezetimibe (ZETIA) 10 MG tablet TAKE ONE TABLET BY MOUTH DAILY 4/21/22   JAYLEN Hernandez CNP   nitroGLYCERIN (NITROSTAT) 0.4 MG SL tablet DISSOLVE 1 TAB UNDER TONGUE FOR CHEST PAIN - IF PAIN REMAINS AFTER 5 MIN, CALL 911 AND REPEAT DOSE.  MAX 3 TABS IN 15 MINUTES 4/12/22   JAYLEN Hernandez CNP   hydrALAZINE (APRESOLINE) 100 MG tablet TAKE ONE TABLET BY MOUTH THREE TIMES A DAY 4/8/22   JAYLEN Hernandez CNP   ranolazine (RANEXA) 500 MG extended release tablet TAKE ONE TABLET BY MOUTH TWICE A DAY 3/28/22   JAYLEN Hernandez CNP   cloNIDine (CATAPRES) 0.1 MG tablet Take 0.1 mg by mouth at bedtime    Historical Provider, MD diphenhydrAMINE (BENADRYL ALLERGY) 25 MG capsule Take 25 mg by mouth every 8 hours x 3 doses 2/24/22   Collette Lovell MD   famotidine (PEPCID) 10 MG tablet Take 5 mg by mouth every 8 hours x 3 doses 2/24/22   Collette Lovell MD   predniSONE (DELTASONE) 20 MG tablet Take 40 mg by mouth every 8 hours x 3 doses 2/24/22   Collette Lovell MD   clopidogrel (PLAVIX) 75 MG tablet TAKE ONE TABLET BY MOUTH DAILY 2/4/22   Jerson Victoria MD   carvedilol (COREG) 25 MG tablet Take 1 tablet by mouth 2 times daily 2/2/22   Jerson Victoria MD   NOVOLOG 100 UNIT/ML injection vial INJECT 40 TO 50 UNITS UNDER THE SKIN DAILY VIA PUMP 11/8/21   Seth Cuello MD   isosorbide mononitrate (IMDUR) 30 MG extended release tablet Take 30 mg by mouth daily    Historical Provider, MD Teri Avalos KWIKPEN 100 UNIT/ML injection pen INJECT 20 UNITS UNDER THE SKIN ONCE NIGHTLY WHEN OFF PUMP 9/7/21   Seth Cuello MD   Continuous Blood Gluc Sensor (DEXCOM G6 SENSOR) MISC 1 Device by Does not apply route daily 3/26/21   Seth Cuello MD   aspirin 81 MG EC tablet Take 1 tablet by mouth daily 3/4/21   Austin Freeman MD   rosuvastatin (CRESTOR) 40 MG tablet Take 1 tablet by mouth nightly 3/3/21   Austin Freeman MD   Cholecalciferol (VITAMIN D3 ULTRA POTENCY) 1.25 MG (94361 UT) TABS Take 50,000 Units by mouth once a week (on Saturdays)    Historical Provider, MD   blood glucose test strips (ACCU-CHEK GUIDE) strip TEST BLOOD SUGAR 6 TIMES A DAY 7/30/20   Seth Cuello MD   Methylcobalamin (B-12) 1000 MCG TBDP Place 1,000 mcg under the tongue daily    Historical Provider, MD       Allergies: Iodides, Shellfish-derived products, and Atorvastatin calcium    Social History:      The patient currently lives at home    TOBACCO:   reports that he has never smoked. He has never used smokeless tobacco.  ETOH:   reports current alcohol use.   E-Cigarettes/Vaping Use     Questions Responses    E-Cigarette/Vaping Use Never User    Start Date Passive Exposure     Quit Date     Counseling Given Yes    Comments             Family History:        Reviewed in detail and negative for DM, CAD, Cancer, CVA. Positive as follows:        Problem Relation Age of Onset    Coronary Art Dis Father     Diabetes Father     Coronary Art Dis Paternal Grandmother     Diabetes Paternal Grandmother     Hypertension Mother     Other Mother         Epilepsy    Other Sister         Thyroid disease  Heart murmur    Mult Sclerosis Brother     Arthritis Brother     Sleep Apnea Brother        REVIEW OF SYSTEMS COMPLETED:   Pertinent positives as noted in the HPI. All other systems reviewed and negative. PHYSICAL EXAM PERFORMED:    BP (!) 140/72   Pulse 71   Temp 94.3 °F (34.6 °C) (Rectal)   Resp 15   Ht 5' 7\" (1.702 m)   SpO2 100%   BMI 31.32 kg/m²     General appearance:  No apparent distress, appears stated age and cooperative. HEENT:  Normal cephalic, atraumatic without obvious deformity. Pupils equal, round, and reactive to light. Extra ocular muscles intact. Conjunctivae/corneas clear. Neck: Supple, with full range of motion. No jugular venous distention. Trachea midline. Respiratory:  Normal respiratory effort. Clear to auscultation, bilaterally without Rales/Wheezes/Rhonchi. Cardiovascular:  Regular rate and rhythm with normal S1/S2 without murmurs, rubs or gallops. Abdomen: Soft, non-tender, non-distended with normal bowel sounds. Musculoskeletal:  No clubbing, cyanosis or edema bilaterally. Full range of motion without deformity. Skin: Skin color, texture, turgor normal.  No rashes or lesions. Neurologic:  Neurovascularly intact without any focal sensory/motor deficits.  Cranial nerves: II-XII intact, grossly non-focal.  Psychiatric:  Alert and oriented, thought content appropriate, normal insight  Capillary Refill: Brisk,3 seconds, normal  Peripheral Pulses: +2 palpable, equal bilaterally       Labs:     Recent Labs     05/20/22  0546   WBC 4.6 HGB 13.4*   HCT 41.6   *     Recent Labs     05/20/22  0546      K 4.4      CO2 24   BUN 49*   CREATININE 8.1*   CALCIUM 8.6     No results for input(s): AST, ALT, BILIDIR, BILITOT, ALKPHOS in the last 72 hours. No results for input(s): INR in the last 72 hours. Recent Labs     05/20/22  0546   TROPONINI 0.08*       Urinalysis:      Lab Results   Component Value Date    NITRU Negative 12/07/2021    WBCUA 4 12/07/2021    BACTERIA Rare 05/25/2010    RBCUA 3 12/07/2021    BLOODU TRACE 12/07/2021    SPECGRAV 1.018 12/07/2021    GLUCOSEU 100 12/07/2021    GLUCOSEU >=1000 05/25/2010       Radiology:        EKG:  I have reviewed the EKG with the following interpretation: No acute ST-T changes    XR CHEST PORTABLE   Final Result   1. No acute abnormality. CT HEAD WO CONTRAST   Final Result   1. No acute intracranial abnormality is identified. 2. Chronic paranasal sinus disease. ASSESSMENT:    Active Hospital Problems    Diagnosis Date Noted    Acute encephalopathy [G93.40] 05/06/2018         PLAN:    Acute metabolic encephalopathy: Secondary to hypoglycemia. Resolved    Hypoglycemia:? Insulin pump dysfunction. Insulin pump without a charge at this time. Discussed with patient to take off insulin pump at this time. We will do low-dose Lantus and sliding scale insulin overnight. Try to resume Xarelto tomorrow morning. Hypothermia: Secondary to hypoglycemia. Resolved. Check TSH. ESRD, renal significant: Consult nephrology. Coronary disease status post CABG: Continue aspirin, Coreg, Plavix, Imdur, Ranexa, Crestor    Carotid artery disease status post recent angioplasty: Continue aspirin, proximal, Crestor    Hypertension    Hyperlipidemia      DVT Prophylaxis: Heparin  Diet: ADULT DIET;  Regular; 3 carb choices (45 gm/meal)  Code Status: Full Code    PT/OT Eval Status: Not needed    Dispo -progressive care/telemetry       Sena Calero MD    Thank you No primary care provider on file. for the opportunity to be involved in this patient's care. If you have any questions or concerns please feel free to contact me at [phone].

## 2022-05-20 NOTE — CONSULTS
[facility]   Nephrology progress Note  [phone]   [email or web address]           Reason for Consult:  ESRD on PD    HISTORY OF PRESENT ILLNESS:      The patient is a 56 y.o. male with significant past medical history of ESRD on peritoneal dialysis, coronary artery disease s/p CABG, carotid artery stenosis s/p stent right internal carotid artery, ischemic cardiomyopathy, hypertension, hyperlipidemia, type 1 diabetes insulin-dependent diagnosed when he was 7years old, recently had right internal carotid angioplasty at [facility] and was discharged on 5/12/2022. Due to history of contrast allergy he was also given preprocedural steroids. He now presents to the hospital with high blood sugars. Post discharge his sugars have been running is 400 he adjusted his insulin pump in the morning of admission he was sweaty confused and noticed that his blood sugar was 71. He was given orange juice paramedics noticed that he continued to be confused. In the ER he was found to have hypothermia.   With temperatures as low as 93 °F    At this time he is alert conscious moving all limbs  There is no dyspnea or chest pain cough or fever  There is no nausea vomiting diarrhea      Past Medical History:        Diagnosis Date    Angina at rest ([address]     Cardiomyopathy ([address]     Carotid artery stenosis 10/25/2016    SUZANNA stented with 8 x 30 mm non-tapered Xact stent    CHF (congestive heart failure) ([address] 3/3/15    EF 30%     CKD (chronic kidney disease) stage 2, GFR 60-89 ml/min     Coronary artery disease     sp CABG UC    Diabetic peripheral neuropathy (HCC)     [facility]     Hyperlipidemia with target LDL less than 70     Hypertension goal BP (blood pressure) < 130/80     PVD (peripheral vascular disease) (HCC)     Seizures (HCC)     Type 1 diabetes mellitus with chronic kidney disease ([address]     c-peptide <0.1 in 2015       Past Surgical History: Procedure Laterality Date    CARDIAC CATHETERIZATION      CARDIAC SURGERY      triple bypass    CAROTID STENT PLACEMENT Right     CORONARY ARTERY BYPASS GRAFT      HERNIA REPAIR      LAPAROSCOPY INSERTION PERITONEAL CATHETER N/A 6/3/2020    LAPAROSCOPIC PLACEMENT OF PERITONEAL DIALYSIS  CATHETER performed by Von Lopes MD at [address] [facility]         Current Medications:    No current facility-administered medications on file prior to encounter. Current Outpatient Medications on File Prior to Encounter   Medication Sig Dispense Refill    calcitRIOL (ROCALTROL) 0.25 MCG capsule Take 0.5 mcg by mouth daily      furosemide (LASIX) 80 MG tablet Take 80 mg by mouth 2 times daily      sacubitril-valsartan (ENTRESTO) 24-26 MG per tablet Take 1 tablet by mouth 2 times daily      ezetimibe (ZETIA) 10 MG tablet TAKE ONE TABLET BY MOUTH DAILY 30 tablet 0    nitroGLYCERIN (NITROSTAT) 0.4 MG SL tablet DISSOLVE 1 TAB UNDER TONGUE FOR CHEST PAIN - IF PAIN REMAINS AFTER 5 MIN, CALL 911 AND REPEAT DOSE.  MAX 3 TABS IN 15 MINUTES 25 tablet 2    hydrALAZINE (APRESOLINE) 100 MG tablet TAKE ONE TABLET BY MOUTH THREE TIMES A DAY 90 tablet 1    ranolazine (RANEXA) 500 MG extended release tablet TAKE ONE TABLET BY MOUTH TWICE A DAY 60 tablet 5    cloNIDine (CATAPRES) 0.1 MG tablet Take 0.1 mg by mouth 2 times daily       famotidine (PEPCID) 10 MG tablet Take 5 mg by mouth every 8 hours x 3 doses 5 tablet 0    clopidogrel (PLAVIX) 75 MG tablet TAKE ONE TABLET BY MOUTH DAILY 90 tablet 2    carvedilol (COREG) 25 MG tablet Take 1 tablet by mouth 2 times daily 60 tablet 5    isosorbide mononitrate (IMDUR) 30 MG extended release tablet Take 30 mg by mouth daily      Continuous Blood Gluc Sensor (DEXCOM G6 SENSOR) MISC 1 Device by Does not apply route daily 1 each 0    aspirin 81 MG EC tablet Take 1 tablet by mouth daily 30 tablet 3    rosuvastatin (CRESTOR) 40 MG tablet Take 1 tablet by mouth nightly 30 tablet 3    Cholecalciferol (VITAMIN D3 ULTRA POTENCY) 1.25 MG (02785 UT) TABS Take 50,000 Units by mouth once a week (on Saturdays)      blood glucose test strips (ACCU-CHEK GUIDE) strip TEST BLOOD SUGAR 6 TIMES A  strip 5    Methylcobalamin (B-12) 1000 MCG TBDP Place 1,000 mcg under the tongue daily         Allergies: Iodides, Shellfish-derived products, and Atorvastatin calcium    Social History:    Social History     Socioeconomic History    Marital status: Legally      Spouse name: Not on file    Number of children: 1    Years of education: Not on file    Highest education level: Not on file   Occupational History    Occupation: IT   Tobacco Use    Smoking status: Never Smoker    Smokeless tobacco: Never Used   Vaping Use    Vaping Use: Never used   Substance and Sexual Activity    Alcohol use: Yes     Comment: one drink a month    Drug use: No    Sexual activity: Yes     Partners: Female   Other Topics Concern    Not on file   Social History Narrative    Not on file     Social Determinants of Health     Financial Resource Strain:     Difficulty of Paying Living Expenses: Not on file   Food Insecurity:     Worried About Running Out of Food in the Last Year: Not on file    Brittany of Food in the Last Year: Not on file   Transportation Needs:     Lack of Transportation (Medical): Not on file    Lack of Transportation (Non-Medical):  Not on file   Physical Activity:     Days of Exercise per Week: Not on file    Minutes of Exercise per Session: Not on file   Stress:     Feeling of Stress : Not on file   Social Connections:     Frequency of Communication with Friends and Family: Not on file    Frequency of Social Gatherings with Friends and Family: Not on file    Attends Orthodox Services: Not on file    Active Member of Clubs or Organizations: Not on file    Attends Club or Organization Meetings: Not on file    Marital Status: Not on file   Intimate Partner Violence:  Fear of Current or Ex-Partner: Not on file    Emotionally Abused: Not on file    Physically Abused: Not on file    Sexually Abused: Not on file   Housing Stability:     Unable to Pay for Housing in the Last Year: Not on file    Number of Places Lived in the Last Year: Not on file    Unstable Housing in the Last Year: Not on file       Family History:       Problem Relation Age of Onset    Coronary Art Dis Father     Diabetes Father     Coronary Art Dis Paternal Grandmother     Diabetes Paternal Grandmother     Hypertension Mother     Other Mother         Epilepsy    Other Sister         Thyroid disease  Heart murmur    Mult Sclerosis Brother     Arthritis Brother     Sleep Apnea Brother        REVIEW OF SYSTEMS:    10 pt ROS done, relevant features as in [place], rest negative       PHYSICAL EXAM:    Vitals:    BP (!) 95/55   Pulse 57   Temp 96.2 °F (35.7 °C) (Temporal)   Resp 10   Ht 5' 7\" (1.702 m)   Wt 204 lb 9.4 oz (92.8 kg)   SpO2 100%   BMI 32.04 kg/m²   No intake/output data recorded. No intake/output data recorded. Physical Exam:  Gen:  alert, oriented x 3  PERRL , EOM +  Pallor +, No icterus  JVP not raised   CV: RRR no murmur or rub . Mid sternal scar +  Lungs:B/ L air entry, Normal breath sounds   Abd: soft, bowel sounds + , No organomegaly , PD catheter- Rt side   Ext:  No Edema , no cyanosis  Skin: Warm.   No rash  Neuro: no focal deficit       DATA:    CBC with Differential:    Lab Results   Component Value Date    WBC 4.6 05/20/2022    RBC 4.72 05/20/2022    HGB 13.4 05/20/2022    HCT 41.6 05/20/2022     05/20/2022    MCV 88.1 05/20/2022    MCH 28.3 05/20/2022    MCHC 32.1 05/20/2022    RDW 15.4 05/20/2022    SEGSPCT 85.2 06/15/2013    LYMPHOPCT 29.0 05/20/2022    MONOPCT 15.1 05/20/2022    EOSPCT 2.1 09/28/2010    BASOPCT 0.4 05/20/2022    MONOSABS 0.7 05/20/2022    LYMPHSABS 1.4 05/20/2022    EOSABS 0.2 05/20/2022    BASOSABS 0.0 05/20/2022    DIFFTYPE Scan-K obtained by the right femoral artery    Thank you for allowing me to participate in the care of this patient. I will continue to follow along. Please call with questions.     Lisset Olea MD, MD  5/20/2022  [facility]

## 2022-05-20 NOTE — ED PROVIDER NOTES
[facility] CVU PROVIDER NOTE    Patient Identification  Pt Name: Federico Houston  MRN: [id number]  Tiffaniegfmichael 1965  Date of evaluation: 5/20/2022  Provider: Brenda Soto MD  PCP: No primary care provider on file. Chief Complaint  Altered Mental Status (Pt brought in by  EMS from home for possible diabetic emergency, BG upon arrival 129, EMS states that wife found him profusely sweating and \"freaking out\", Pt arrives A and O x 4. Peritoneal Dialysis patient.)      HPI  History provided by patient   This is a 56 y.o. male who presents to the ED for altered mental status. Yesterday, the patient was having chest discomfort. Sharp in character. Nonradiating. Attributed it to having high blood sugar in the 400s. Not having shortness of breath at that time. It resolved after a few hours. This morning, his wife found him sweating profusely and confused/less responsive than usual.  She checked his blood sugar and it was 71. He has a continuous glucose monitor and insulin pump however they were not communicating with each other as they typically do. She gave him orange juice. He has not had this kind of lethargy from a blood sugar of 71 in the past.  He has some paresthesias in his right leg but nowhere else. Denies any pain. His right leg does have edema which is intermittent for many years. No abdominal pain. No dysuria. No cough. Is having chills. Feels weak. No headache. No neck stiffness    ROS  12 systems reviewed, pertinent positives/negatives per HPI otherwise noted to be negative. I have reviewed the following nursing documentation:  Allergies:  Iodides, Shellfish-derived products, and Atorvastatin calcium    Past medical history:   Past Medical History:   Diagnosis Date    Angina at rest ([address]     [facility]     Carotid artery stenosis 10/25/2016    SUZANNA stented with 8 x 30 mm non-tapered Xact stent    CHF (congestive heart failure) ([address] 3/3/15    EF 30%     CKD (chronic kidney disease) stage 2, GFR 60-89 ml/min     Coronary artery disease     sp CABG UC    Diabetic peripheral neuropathy (HCC)     Diastolic HF (heart failure) (HCC)     Hyperlipidemia with target LDL less than 70     Hypertension goal BP (blood pressure) < 130/80     PVD (peripheral vascular disease) (HCC)     Seizures (HCC)     Type 1 diabetes mellitus with chronic kidney disease (HCC)     c-peptide <0.1 in 2015     Past surgical history:   Past Surgical History:   Procedure Laterality Date    CARDIAC CATHETERIZATION      CARDIAC SURGERY      triple bypass    CAROTID STENT PLACEMENT Right     CORONARY ARTERY BYPASS GRAFT      HERNIA REPAIR      LAPAROSCOPY INSERTION PERITONEAL CATHETER N/A 6/3/2020    LAPAROSCOPIC PLACEMENT OF PERITONEAL DIALYSIS  CATHETER performed by Anjali Kyle MD at [address] [place] [address] medications:   Current Discharge Medication List      CONTINUE these medications which have NOT CHANGED    Details   calcitRIOL (ROCALTROL) 0.25 MCG capsule Take 0.5 mcg by mouth daily      furosemide (LASIX) 80 MG tablet Take 80 mg by mouth 2 times daily      sacubitril-valsartan (ENTRESTO) 24-26 MG per tablet Take 1 tablet by mouth 2 times daily      ezetimibe (ZETIA) 10 MG tablet TAKE ONE TABLET BY MOUTH DAILY  Qty: 30 tablet, Refills: 0      nitroGLYCERIN (NITROSTAT) 0.4 MG SL tablet DISSOLVE 1 TAB UNDER TONGUE FOR CHEST PAIN - IF PAIN REMAINS AFTER 5 MIN, CALL 911 AND REPEAT DOSE.  MAX 3 TABS IN 15 MINUTES  Qty: 25 tablet, Refills: 2      hydrALAZINE (APRESOLINE) 100 MG tablet TAKE ONE TABLET BY MOUTH THREE TIMES A DAY  Qty: 90 tablet, Refills: 1      ranolazine (RANEXA) 500 MG extended release tablet TAKE ONE TABLET BY MOUTH TWICE A DAY  Qty: 60 tablet, Refills: 5    Associated Diagnoses: Coronary artery disease due to lipid rich plaque      cloNIDine (CATAPRES) 0.1 MG tablet Take 0.1 mg by mouth 2 times daily       famotidine (PEPCID) 10 MG tablet Take 5 mg by mouth every 8 hours x 3 doses  Qty: 5 tablet, Refills: 0      clopidogrel (PLAVIX) 75 MG tablet TAKE ONE TABLET BY MOUTH DAILY  Qty: 90 tablet, Refills: 2      carvedilol (COREG) 25 MG tablet Take 1 tablet by mouth 2 times daily  Qty: 60 tablet, Refills: 5      isosorbide mononitrate (IMDUR) 30 MG extended release tablet Take 30 mg by mouth daily      Continuous Blood Gluc Sensor (DEXCOM G6 SENSOR) MISC 1 Device by Does not apply route daily  Qty: 1 each, Refills: 0      aspirin 81 MG EC tablet Take 1 tablet by mouth daily  Qty: 30 tablet, Refills: 3      rosuvastatin (CRESTOR) 40 MG tablet Take 1 tablet by mouth nightly  Qty: 30 tablet, Refills: 3      Cholecalciferol (VITAMIN D3 ULTRA POTENCY) 1.25 MG (70948 UT) TABS Take 50,000 Units by mouth once a week (on Saturdays)      blood glucose test strips (ACCU-CHEK GUIDE) strip TEST BLOOD SUGAR 6 TIMES A DAY  Qty: 200 strip, Refills: 5      Methylcobalamin (B-12) 1000 MCG TBDP Place 1,000 mcg under the tongue daily             Social history:  reports that he has never smoked. He has never used smokeless tobacco. He reports current alcohol use. He reports that he does not use drugs. Family history:    Family History   Problem Relation Age of Onset    Coronary Art Dis Father     Diabetes Father     Coronary Art Dis Paternal Grandmother     Diabetes Paternal Grandmother     Hypertension Mother     Other Mother         Epilepsy    Other Sister         Thyroid disease  Heart murmur    Mult Sclerosis Brother     Arthritis Brother     Sleep Apnea Brother          Exam  ED Triage Vitals [05/20/22 0521]   BP Temp Temp Source Pulse Resp SpO2 Height Weight   (!) 145/73 93.3 °F (34.1 °C) Oral 68 12 100 % 5' 7\" (1.702 m) --     Nursing note and vitals reviewed. Constitutional: In no acute distress  HENT:      Head: Normocephalic      Ears: External ears normal.      Nose: Nose normal.     Mouth: Membrane mucosa moist   Eyes: No discharge. Neck: Supple. Trachea midline. Cardiovascular: Regular rate. Warm extremities  Pulmonary/Chest: Effort normal. No respiratory distress. Abdominal: Soft. No distension. Nontender  : Deferred  Rectal: Deferred   Musculoskeletal: Moves all extremities. No gross deformity. Neurological: Alert and oriented. Face symmetric. Speech is clear. Cranial nerves II to XII intact. Decreased sensation light palpation right lower extremity with normal strength in bilateral extremities. Skin: Warm and dry. Psychiatric: Normal mood and affect. Behavior is normal.    Procedures      EKG  The Ekg interpreted by me in the absence of a cardiologist shows. Normal sinus rhythm. No specific ST changes appreciated. HR 67  Nonspecific t wave abn  No significant change from prior EKG dated 4/21        Radiology  XR CHEST PORTABLE   Final Result   1. No acute abnormality. CT HEAD WO CONTRAST   Final Result   1. No acute intracranial abnormality is identified. 2. Chronic paranasal sinus disease.              Labs  Results for orders placed or performed during the hospital encounter of 05/20/22   CBC with Auto Differential   Result Value Ref Range    WBC 4.6 4.0 - 11.0 K/uL    RBC 4.72 4.20 - 5.90 M/uL    Hemoglobin 13.4 (L) 13.5 - 17.5 g/dL    Hematocrit 41.6 40.5 - 52.5 %    MCV 88.1 80.0 - 100.0 fL    MCH 28.3 26.0 - 34.0 pg    MCHC 32.1 31.0 - 36.0 g/dL    RDW 15.4 12.4 - 15.4 %    Platelets 692 (L) 064 - 450 K/uL    MPV 11.2 (H) 5.0 - 10.5 fL    Neutrophils % 52.0 %    Lymphocytes % 29.0 %    Monocytes % 15.1 %    Eosinophils % 3.5 %    Basophils % 0.4 %    Neutrophils Absolute 2.4 1.7 - 7.7 K/uL    Lymphocytes Absolute 1.4 1.0 - 5.1 K/uL    Monocytes Absolute 0.7 0.0 - 1.3 K/uL    Eosinophils Absolute 0.2 0.0 - 0.6 K/uL    Basophils Absolute 0.0 0.0 - 0.2 K/uL   Basic Metabolic Panel w/ Reflex to MG   Result Value Ref Range    Sodium 142 136 - 145 mmol/L    Potassium reflex Magnesium 4.4 3.5 - 5.1 mmol/L    Chloride 105 99 - 110 mmol/L    CO2 24 21 - 32 mmol/L    Anion Gap 13 3 - 16    Glucose 148 (H) 70 - 99 mg/dL    BUN 49 (H) 7 - 20 mg/dL    CREATININE 8.1 (HH) 0.9 - 1.3 mg/dL    GFR Non-African American 7 (A) >60    GFR  8 (A) >60    Calcium 8.6 8.3 - 10.6 mg/dL   Troponin   Result Value Ref Range    Troponin 0.08 (H) <0.01 ng/mL   Lactate, Sepsis   Result Value Ref Range    Lactic Acid, Sepsis 1.7 0.4 - 1.9 mmol/L   POCT Glucose   Result Value Ref Range    POC Glucose 129 (H) 70 - 99 mg/dl    Performed on ACCU-CHEK    POCT Glucose   Result Value Ref Range    POC Glucose 213 (H) 70 - 99 mg/dl    Performed on ACCU-CHEK    EKG 12 Lead   Result Value Ref Range    Ventricular Rate 67 BPM    Atrial Rate 67 BPM    P-R Interval 156 ms    QRS Duration 110 ms    Q-T Interval 448 ms    QTc Calculation (Bazett) 473 ms    P Axis 71 degrees    R Axis 54 degrees    T Axis 17 degrees    Diagnosis       Normal sinus rhythmPoor R wave progressionAbnormal ECGConfirmed by Carl Montano MD, Dylon Lopes ([id number]) on 5/20/2022 12:21:38 PM       Screenings  NIH Stroke Scale  Interval: Baseline  Level of Consciousness (1a): Alert  LOC Questions (1b): Answers both correctly  LOC Commands (1c): Performs both tasks correctly  Best Gaze (2): Normal  Visual (3): No visual loss  Facial Palsy (4): Normal symmetrical movement  Motor Arm, Left (5a): No drift  Motor Arm, Right (5b): No drift  Motor Leg, Left (6a): No drift  Motor Leg, Right (6b): No drift  Limb Ataxia (7): Absent  Sensory (8): (!) Mild to Moderate  Best Language (9): No aphasia  Dysarthria (10): Normal  Extinction and Inattention (11): No abnormality  Total: 1Glasgow Coma Scale  Eye Opening: Spontaneous  Best Verbal Response: Oriented  Best Motor Response: Obeys commands  [place] Coma Scale Score: 15       MDM and ED Course  This is a 56 y.o. male who presents to the ED for altered mental status/lethargy that seems to have been improving. He is found to be hypothermic. Unclear etiology.   May be related to hypoglycemia however lowest blood sugar found was only 71. Sepsis in differential but he is not having any symptoms of infection at this time. No headache or nausea to indicate meningitis or neck stiffness. No cough or shortness of breath to indicate upper respiratory infection. No dysuria to indicate urinary tract infection. He does have history of peritoneal dialysis but has no abdominal pain or tenderness. Was having chest discomfort. Symptoms are atypical for ACS however patient has multiple cardiac risk factors. Has history of coronary artery disease/CABG and last left heart cath in 2021 showed multivessel disease. Anticipate admission to hospitalist service         [unfilled]    Is this patient to be included in the SEP-1 Core Measure due to severe sepsis or septic shock? No   Exclusion criteria - the patient is NOT to be included for SEP-1 Core Measure due to:  2+ SIRS criteria are not met        Final Impression  1. Altered mental status, unspecified altered mental status type    2. Hypothermia, initial encounter        Blood pressure (!) 95/55, pulse 57, temperature 96.2 °F (35.7 °C), temperature source Temporal, resp. rate 10, height 5' 7\" (1.702 m), weight 204 lb 9.4 oz (92.8 kg), SpO2 100 %. Disposition:  DISPOSITION Admitted 05/20/2022 07:45:35 AM      Patient Referrals:  No follow-up provider specified. Discharge Medications:  Current Discharge Medication List          Discontinued Medications:  Current Discharge Medication List      STOP taking these medications       diphenhydrAMINE (BENADRYL ALLERGY) 25 MG capsule Comments:   Reason for Stopping:         predniSONE (DELTASONE) 20 MG tablet Comments:   Reason for Stopping:         NOVOLOG 100 UNIT/ML injection vial Comments:   Reason for Stopping:         BASAGLAR KWIKPEN 100 UNIT/ML injection pen Comments:   Reason for Stopping: This chart was generated using the [address] [place] [address] [employer] system.  I created this record but it may contain dictation errors given the limitations of this technology.         Randal Johns MD  05/20/22 [id number]

## 2022-05-20 NOTE — ED NOTES
Report given to LAURA Turcios. No further questions at this time.       Sivan Hinojosa RN  05/20/22 [id number]

## 2022-05-20 NOTE — PROGRESS NOTES
Pharmacy Medication History Note      List of current medications patient is taking is complete. Source of information: patient, fill history    Changes made to medication list:    Medications removed (include reason, ex.  therapy complete or physician discontinued):  Basaglar - no longer taking  Novolog - no longer taking   Benadryl capsule - therapy completed   Prednisone 20 mg tablet - therapy completed     Medications added/doses adjusted:  Clonidine 0.1 mg daily changed to BID   Furosemide 80 mg tablets added - 1 BID  Entresto 24-26 mg tablets added - 1 BID     Deandre Braswell, PharmD, [facility]  Clinical Pharmacist  [id number]

## 2022-05-20 NOTE — CARE COORDINATION
Discharge Planning Assessment  Discharge Planning Assessment  RN/SW discharge planner met with patient/ (and family member) to discuss reason for admission, current living situation, and potential needs at the time of discharge    Demographics/Insurance verified Yes    Current type of dwelling:Split level home , unsure how many steps up and down     Patient from ECF/SW confirmed with: n/a    Living arrangements: lives with fiancee    Level of function/Support:Independent, fiancee supportive     Camilla Jain at [facility]. CM updated PCP in chart     Last Visit to PCP:last week of April     DME:none     Active with any community resources/agencies/skilled home care: none     Medication compliance issues:independent    Financial issues that could impact healthcare:none         Tentative discharge plan: home with no anticipated needs    Discussed and provided facilities of choice if transition to a skilled nursing facility is required at the time of discharge    Not anticipated at this time. Discussed with patient and/or family that on the day of discharge home tentative time of discharge will be between 10 AM and noon.     Transportation at the time of discharge:Santi Huffman RN, BSN  [phone]

## 2022-05-20 NOTE — ED NOTES
Handoff report given to [facility], all questions answered, and Pt remains in stable condition at this time.      Lilliana Guaman RN  05/20/22 [id number]

## 2022-05-21 VITALS
OXYGEN SATURATION: 100 % | WEIGHT: 197.31 LBS | HEART RATE: 79 BPM | DIASTOLIC BLOOD PRESSURE: 70 MMHG | SYSTOLIC BLOOD PRESSURE: 132 MMHG | BODY MASS INDEX: 30.97 KG/M2 | HEIGHT: 67 IN | TEMPERATURE: 97.9 F | RESPIRATION RATE: 16 BRPM

## 2022-05-21 LAB
ANION GAP SERPL CALCULATED.3IONS-SCNC: 14 MMOL/L (ref 3–16)
BASOPHILS ABSOLUTE: 0 K/UL (ref 0–0.2)
BASOPHILS RELATIVE PERCENT: 0.8 %
BUN BLDV-MCNC: 45 MG/DL (ref 7–20)
CALCIUM SERPL-MCNC: 8.7 MG/DL (ref 8.3–10.6)
CHLORIDE BLD-SCNC: 100 MMOL/L (ref 99–110)
CO2: 23 MMOL/L (ref 21–32)
CREAT SERPL-MCNC: 7 MG/DL (ref 0.9–1.3)
EOSINOPHILS ABSOLUTE: 0.1 K/UL (ref 0–0.6)
EOSINOPHILS RELATIVE PERCENT: 2.2 %
GFR AFRICAN AMERICAN: 10
GFR NON-AFRICAN AMERICAN: 8
GLUCOSE BLD-MCNC: 308 MG/DL (ref 70–99)
GLUCOSE BLD-MCNC: 315 MG/DL (ref 70–99)
GLUCOSE BLD-MCNC: 349 MG/DL (ref 70–99)
GLUCOSE BLD-MCNC: 350 MG/DL (ref 70–99)
HCT VFR BLD CALC: 43 % (ref 40.5–52.5)
HEMOGLOBIN: 13.8 G/DL (ref 13.5–17.5)
LYMPHOCYTES ABSOLUTE: 1.5 K/UL (ref 1–5.1)
LYMPHOCYTES RELATIVE PERCENT: 36.2 %
MCH RBC QN AUTO: 28.4 PG (ref 26–34)
MCHC RBC AUTO-ENTMCNC: 32.1 G/DL (ref 31–36)
MCV RBC AUTO: 88.4 FL (ref 80–100)
MONOCYTES ABSOLUTE: 0.4 K/UL (ref 0–1.3)
MONOCYTES RELATIVE PERCENT: 10.3 %
NEUTROPHILS ABSOLUTE: 2.1 K/UL (ref 1.7–7.7)
NEUTROPHILS RELATIVE PERCENT: 50.5 %
PDW BLD-RTO: 15.6 % (ref 12.4–15.4)
PERFORMED ON: ABNORMAL
PLATELET # BLD: 117 K/UL (ref 135–450)
PMV BLD AUTO: 11 FL (ref 5–10.5)
POTASSIUM REFLEX MAGNESIUM: 4.8 MMOL/L (ref 3.5–5.1)
RBC # BLD: 4.87 M/UL (ref 4.2–5.9)
SODIUM BLD-SCNC: 137 MMOL/L (ref 136–145)
TSH REFLEX: 1.12 UIU/ML (ref 0.27–4.2)
WBC # BLD: 4.2 K/UL (ref 4–11)

## 2022-05-21 PROCEDURE — 90945 DIALYSIS ONE EVALUATION: CPT

## 2022-05-21 PROCEDURE — 6370000000 HC RX 637 (ALT 250 FOR IP): Performed by: INTERNAL MEDICINE

## 2022-05-21 PROCEDURE — 96372 THER/PROPH/DIAG INJ SC/IM: CPT

## 2022-05-21 PROCEDURE — 85025 COMPLETE CBC W/AUTO DIFF WBC: CPT

## 2022-05-21 PROCEDURE — 2580000003 HC RX 258: Performed by: INTERNAL MEDICINE

## 2022-05-21 PROCEDURE — G0378 HOSPITAL OBSERVATION PER HR: HCPCS

## 2022-05-21 PROCEDURE — 6360000002 HC RX W HCPCS: Performed by: INTERNAL MEDICINE

## 2022-05-21 PROCEDURE — 80048 BASIC METABOLIC PNL TOTAL CA: CPT

## 2022-05-21 RX ORDER — INSULIN LISPRO 100 [IU]/ML
0-12 INJECTION, SOLUTION INTRAVENOUS; SUBCUTANEOUS
Status: DISCONTINUED | OUTPATIENT
Start: 2022-05-21 | End: 2022-05-21 | Stop reason: HOSPADM

## 2022-05-21 RX ORDER — INSULIN LISPRO 100 [IU]/ML
0-6 INJECTION, SOLUTION INTRAVENOUS; SUBCUTANEOUS NIGHTLY
Status: DISCONTINUED | OUTPATIENT
Start: 2022-05-21 | End: 2022-05-21 | Stop reason: HOSPADM

## 2022-05-21 RX ADMIN — CARVEDILOL 25 MG: 25 TABLET, FILM COATED ORAL at 09:13

## 2022-05-21 RX ADMIN — GENTAMICIN SULFATE: 1 CREAM TOPICAL at 09:17

## 2022-05-21 RX ADMIN — SODIUM CHLORIDE, PRESERVATIVE FREE 10 ML: 5 INJECTION INTRAVENOUS at 09:18

## 2022-05-21 RX ADMIN — RANOLAZINE 500 MG: 500 TABLET, FILM COATED, EXTENDED RELEASE ORAL at 09:15

## 2022-05-21 RX ADMIN — ASPIRIN 81 MG: 81 TABLET, COATED ORAL at 09:15

## 2022-05-21 RX ADMIN — INSULIN LISPRO 4 UNITS: 100 INJECTION, SOLUTION INTRAVENOUS; SUBCUTANEOUS at 07:04

## 2022-05-21 RX ADMIN — ISOSORBIDE MONONITRATE 30 MG: 30 TABLET, EXTENDED RELEASE ORAL at 09:14

## 2022-05-21 RX ADMIN — CLOPIDOGREL BISULFATE 75 MG: 75 TABLET ORAL at 09:14

## 2022-05-21 RX ADMIN — HYDRALAZINE HYDROCHLORIDE 100 MG: 100 TABLET, FILM COATED ORAL at 04:59

## 2022-05-21 RX ADMIN — INSULIN LISPRO 8 UNITS: 100 INJECTION, SOLUTION INTRAVENOUS; SUBCUTANEOUS at 12:03

## 2022-05-21 RX ADMIN — CLONIDINE HYDROCHLORIDE 0.1 MG: 0.1 TABLET ORAL at 09:14

## 2022-05-21 RX ADMIN — HEPARIN SODIUM 5000 UNITS: 5000 INJECTION INTRAVENOUS; SUBCUTANEOUS at 04:59

## 2022-05-21 ASSESSMENT — PAIN SCALES - GENERAL: PAINLEVEL_OUTOF10: 0

## 2022-05-21 NOTE — PROGRESS NOTES
Shift assessment    Patient alert and oriented, PD in progress via cycler, this is an 8 hour run. Patient is on room air, NSR, PIV to right forearm, voids per urinal. Tolerating PO and meds are WWW. Pt denies n/v and pain.     Sonia Espinal RN, CRRN, 5/21/2022,1:21 AM

## 2022-05-21 NOTE — PROGRESS NOTES
[facility]   Nephrology progress Note  [phone]   [facility]. [employer]           Reason for Consult:  ESRD on PD  The patient is a 56 y.o. male with significant past medical history of ESRD on peritoneal dialysis, coronary artery disease s/p CABG, carotid artery stenosis s/p stent right internal carotid artery, ischemic cardiomyopathy, hypertension, hyperlipidemia, type 1 diabetes insulin-dependent diagnosed when he was 7years old, recently had right internal carotid angioplasty at [facility] and was discharged on 5/12/2022. Due to history of contrast allergy he was also given preprocedural steroids. He now presents to the hospital with high blood sugars. Post discharge his sugars have been running is 400 he adjusted his insulin pump in the morning of admission he was sweaty confused and noticed that his blood sugar was 71. He was given orange juice paramedics noticed that he continued to be confused. In the ER he was found to have hypothermia. With temperatures as low as 93 °F    At this time he is alert conscious moving all limbs  There is no dyspnea or chest pain cough or fever  There is no nausea/vomiting /diarrhea      Interval  History:    Well , PD effluent is clear      PHYSICAL EXAM:    Vitals:    BP (!) 147/76   Pulse 70   Temp 96 °F (35.6 °C)   Resp 20   Ht 5' 7\" (1.702 m)   Wt 197 lb 5 oz (89.5 kg)   SpO2 98%   BMI 30.90 kg/m²   I/O last 3 completed shifts: In: 480 [P.O.:480]  Out: 1125 [Urine:1125]  No intake/output data recorded. Physical Exam:  Gen:  alert, oriented x 3  PERRL , EOM +  Pallor +, No icterus  JVP not raised   CV: RRR no murmur or rub . Mid sternal scar+  Lungs:B/ L air entry, Normal breath sounds   Abd: soft, bowel sounds + , No organomegaly , PD catheter- Rt side   Ext:  No Edema , no cyanosis  Skin: Warm.   No rash  Neuro: no focal deficit       DATA:    CBC with Differential:    Lab Results   Component Value Date    WBC 4.2 05/21/2022    RBC 4.87 05/21/2022    HGB 13.8 05/21/2022    HCT 43.0 05/21/2022     05/21/2022    MCV 88.4 05/21/2022    MCH 28.4 05/21/2022    MCHC 32.1 05/21/2022    RDW 15.6 05/21/2022    SEGSPCT 85.2 06/15/2013    LYMPHOPCT 36.2 05/21/2022    MONOPCT 10.3 05/21/2022    EOSPCT 2.1 09/28/2010    BASOPCT 0.8 05/21/2022    MONOSABS 0.4 05/21/2022    LYMPHSABS 1.5 05/21/2022    EOSABS 0.1 05/21/2022    BASOSABS 0.0 05/21/2022    DIFFTYPE Scan-K 06/15/2013     CMP:    Lab Results   Component Value Date     05/21/2022    K 4.8 05/21/2022     05/21/2022    CO2 23 05/21/2022    BUN 45 05/21/2022    CREATININE 7.0 05/21/2022    GFRAA 10 05/21/2022    GFRAA 44 06/15/2013    AGRATIO 0.8 12/08/2021    LABGLOM 8 05/21/2022    GLUCOSE 349 05/21/2022    PROT 5.8 12/08/2021    PROT 7.9 09/28/2010    LABALBU 2.6 12/08/2021    CALCIUM 8.7 05/21/2022    BILITOT <0.2 12/08/2021    ALKPHOS 85 12/08/2021    AST 42 12/08/2021    ALT 27 12/08/2021     Magnesium:    Lab Results   Component Value Date    MG 2.10 04/09/2021     Phosphorus:    Lab Results   Component Value Date    PHOS 4.3 04/10/2021     Uric Acid:    Lab Results   Component Value Date    LABURIC 6.4 05/01/2020     Last 3 Troponin:    Lab Results   Component Value Date    TROPONINI 0.06 05/20/2022    TROPONINI 0.08 05/20/2022    TROPONINI 0.05 12/08/2021     U/A:    Lab Results   Component Value Date    COLORU YELLOW 12/07/2021    PROTEINU >=300 12/07/2021    PHUR 5.5 12/07/2021    WBCUA 4 12/07/2021    RBCUA 3 12/07/2021    MUCUS 1+ 05/25/2010    BACTERIA Rare 05/25/2010    CLARITYU CLOUDY 12/07/2021    SPECGRAV 1.018 12/07/2021    LEUKOCYTESUR Negative 12/07/2021    UROBILINOGEN 0.2 12/07/2021    BILIRUBINUR Negative 12/07/2021    BILIRUBINUR NEGATIVE 05/25/2010    BLOODU TRACE 12/07/2021    GLUCOSEU 100 12/07/2021    GLUCOSEU >=1000 05/25/2010    AMORPHOUS 2+ 12/07/2021           IMPRESSION/RECOMMENDATIONS:      1.   ESRD on an NIPD: Uses a cycler at night has residual renal function, will proceed try 5 L 1.5% dextrose and 5 L of 2.5% dextrose to be given tonight  2. Anemia of chronic kidney disease: His hemoglobin is 13.8, so will not need erythropoietin  3. Renal osteodystrophy: We will monitor calcium and phosphorus levels  4. T1 DM: Treatment per primary team  5. Hypertension: Monitor  6. Peripheral vascular disease: S/p right carotid stent placement by Dr. Kendrick Prater on 5/12/2022 access was obtained by the right femoral artery    Thank you for allowing me to participate in the care of this patient. I will continue to follow along. Please call with questions.     Fátima Arndt MD, MD  5/21/2022  [facility]

## 2022-05-21 NOTE — PROGRESS NOTES
Reassessment. No acute changes, PD continues-tolerating well.      Barbara Jacome RN, CRRN, 5/21/2022,1:37 AM

## 2022-05-21 NOTE — FLOWSHEET NOTE
05/21/22 0628   Vital Signs   BP (!) 158/64   Temp 96 °F (35.6 °C)   Pulse 69   Resp 20   Weight 197 lb 5 oz (89.5 kg)   Weight Method Actual;Standing scale     Disconnected from CCPD per protocol. Effluent: clear yellow without fibrin    Total time: 09:07  Total UF:  -160 ml. Total Volume:  6897 ml. Dwell time gained:  00:25    Pt Tolerated procedure: Without difficulty. Report given to: Loraine Sosa RN    Last BM: 4-. Stated does not need laxative at this time, hasn't been eating much.

## 2022-05-21 NOTE — FLOWSHEET NOTE
05/20/22 1920   Vitals   /62   Temp 96.7 °F (35.9 °C)   Temp Source Temporal   Pulse 62   Resp 16   SpO2 99 %   Weight 197 lb 1.5 oz (89.4 kg)       CCPD Order   Exchanges: 3   Exchange Volume: 2300 ml   Total Time: 8 hrs   Dextrose: 1.5% and 2.5%   Last Fill: 0 ml   Total Volume: 6900 ml     Orders verified. Supplies taken to pt's room. Report received. Report given to Car Lovell RN 1932. Cycler set up, primed and pre tested. Dressing changed on [facility] Cath site. Pt connected to cycler. CCPD initiated without problem. Initial effluent clear.

## 2022-05-21 NOTE — PROGRESS NOTES
Reassessment    PD finished for night without complication. U/O was good, pt denies n/v and pain. Tolerating PO well.      Andre Early RN, CRRN, 5/21/2022,6:03 AM

## 2022-05-21 NOTE — DISCHARGE SUMMARY
in 200s. Apparently upon arrival to the floor , he  found his insulin pump ran out of battery and is not working. Treated as follows:       Acute metabolic encephalopathy: Secondary to hypoglycemia. Resolved     Hypoglycemia: sec to   Insulin pump malfunction. managed with  Lantus and sliding scale insulin  Here overnight. next morning, insulin pump restarted and his sugars were 190prior to discharge. Hypothermia: Secondary to hypoglycemia. Resolved. .        ESRD: nephro on board    Coronary disease status post CABG:aspirin, Coreg, Plavix, Imdur, Ranexa, Crestor     Carotid artery disease status post recent angioplasty:  aspirin, proximal, Crestor     Hypertension     Hyperlipidemia       - discharged home in stable condition. Advised f/u with PCP in 1 week       Physical Exam Performed:     /70   Pulse 79   Temp 97.9 °F (36.6 °C) (Temporal)   Resp 16   Ht 5' 7\" (1.702 m)   Wt 197 lb 5 oz (89.5 kg)   SpO2 100%   BMI 30.90 kg/m²       General appearance:  No apparent distress, appears stated age and cooperative. HEENT:  Normal cephalic, atraumatic without obvious deformity. Pupils equal, round, and reactive to light. Extra ocular muscles intact. Conjunctivae/corneas clear. Neck: Supple, with full range of motion. No jugular venous distention. Trachea midline. Respiratory:  Normal respiratory effort. Clear to auscultation, bilaterally without Rales/Wheezes/Rhonchi. Cardiovascular:  Regular rate and rhythm with normal S1/S2 without murmurs, rubs or gallops. Abdomen: Soft, non-tender, non-distended with normal bowel sounds. Musculoskeletal:  No clubbing, cyanosis or edema bilaterally. Full range of motion without deformity. Skin: Skin color, texture, turgor normal.  No rashes or lesions. Neurologic:  Neurovascularly intact without any focal sensory/motor deficits.  Cranial nerves: II-XII intact, grossly non-focal.  Psychiatric:  Alert and oriented, thought content appropriate, normal insight  Capillary Refill: Brisk,< 3 seconds   Peripheral Pulses: +2 palpable, equal bilaterally       Labs: For convenience and continuity at follow-up the following most recent labs are provided:      CBC:    Lab Results   Component Value Date    WBC 4.2 05/21/2022    HGB 13.8 05/21/2022    HCT 43.0 05/21/2022     05/21/2022       Renal:    Lab Results   Component Value Date     05/21/2022    K 4.8 05/21/2022     05/21/2022    CO2 23 05/21/2022    BUN 45 05/21/2022    CREATININE 7.0 05/21/2022    CALCIUM 8.7 05/21/2022    PHOS 4.3 04/10/2021         Significant Diagnostic Studies    Radiology:   XR CHEST PORTABLE   Final Result   1. No acute abnormality. CT HEAD WO CONTRAST   Final Result   1. No acute intracranial abnormality is identified. 2. Chronic paranasal sinus disease. Consults:     IP CONSULT TO HOSPITALIST  IP CONSULT TO NEPHROLOGY    Disposition:  Home      Condition at Discharge: Stable    Discharge Instructions/Follow-up:  PCP in 1 week     Code Status:  Full Code     Activity: activity as tolerated    Diet: diabetic diet      Discharge Medications:     Current Discharge Medication List           Details   calcitRIOL (ROCALTROL) 0.25 MCG capsule Take 0.5 mcg by mouth daily      furosemide (LASIX) 80 MG tablet Take 80 mg by mouth 2 times daily      sacubitril-valsartan (ENTRESTO) 24-26 MG per tablet Take 1 tablet by mouth 2 times daily      ezetimibe (ZETIA) 10 MG tablet TAKE ONE TABLET BY MOUTH DAILY  Qty: 30 tablet, Refills: 0      nitroGLYCERIN (NITROSTAT) 0.4 MG SL tablet DISSOLVE 1 TAB UNDER TONGUE FOR CHEST PAIN - IF PAIN REMAINS AFTER 5 MIN, CALL 911 AND REPEAT DOSE.  MAX 3 TABS IN 15 MINUTES  Qty: 25 tablet, Refills: 2      hydrALAZINE (APRESOLINE) 100 MG tablet TAKE ONE TABLET BY MOUTH THREE TIMES A DAY  Qty: 90 tablet, Refills: 1      ranolazine (RANEXA) 500 MG extended release tablet TAKE ONE TABLET BY MOUTH TWICE A DAY  Qty: 60 tablet, Refills: 5 Associated Diagnoses: Coronary artery disease due to lipid rich plaque      cloNIDine (CATAPRES) 0.1 MG tablet Take 0.1 mg by mouth 2 times daily       famotidine (PEPCID) 10 MG tablet Take 5 mg by mouth every 8 hours x 3 doses  Qty: 5 tablet, Refills: 0      clopidogrel (PLAVIX) 75 MG tablet TAKE ONE TABLET BY MOUTH DAILY  Qty: 90 tablet, Refills: 2      carvedilol (COREG) 25 MG tablet Take 1 tablet by mouth 2 times daily  Qty: 60 tablet, Refills: 5      isosorbide mononitrate (IMDUR) 30 MG extended release tablet Take 30 mg by mouth daily      Continuous Blood Gluc Sensor (DEXCOM G6 SENSOR) MISC 1 Device by Does not apply route daily  Qty: 1 each, Refills: 0      aspirin 81 MG EC tablet Take 1 tablet by mouth daily  Qty: 30 tablet, Refills: 3      rosuvastatin (CRESTOR) 40 MG tablet Take 1 tablet by mouth nightly  Qty: 30 tablet, Refills: 3      Cholecalciferol (VITAMIN D3 ULTRA POTENCY) 1.25 MG (65405 UT) TABS Take 50,000 Units by mouth once a week (on Saturdays)      blood glucose test strips (ACCU-CHEK GUIDE) strip TEST BLOOD SUGAR 6 TIMES A DAY  Qty: 200 strip, Refills: 5      Methylcobalamin (B-12) 1000 MCG TBDP Place 1,000 mcg under the tongue daily             Time Spent on discharge is more than 30 minutes in the examination, evaluation, counseling and review of medications and discharge plan. Signed:    Cristal Vidales MD   5/21/2022      Thank you Edgardo Balderas MD for the opportunity to be involved in this patient's care. If you have any questions or concerns, please feel free to contact me at [phone].

## 2022-05-23 RX ORDER — EZETIMIBE 10 MG/1
TABLET ORAL
Qty: 30 TABLET | Refills: 0 | Status: SHIPPED | OUTPATIENT
Start: 2022-05-23 | End: 2022-07-21

## 2022-05-24 LAB
BLOOD CULTURE, ROUTINE: NORMAL
CULTURE, BLOOD 2: NORMAL

## 2022-06-17 ENCOUNTER — TELEPHONE (OUTPATIENT)
Dept: ENDOCRINOLOGY | Age: 57
End: 2022-06-17

## 2022-06-17 DIAGNOSIS — E10.22 TYPE 1 DIABETES MELLITUS WITH STAGE 4 CHRONIC KIDNEY DISEASE (HCC): Primary | ICD-10-CM

## 2022-06-17 DIAGNOSIS — N18.4 TYPE 1 DIABETES MELLITUS WITH STAGE 4 CHRONIC KIDNEY DISEASE (HCC): Primary | ICD-10-CM

## 2022-06-17 NOTE — TELEPHONE ENCOUNTER
Patient requesting refill of   Continuous Blood Gluc Sensor (DEXCOM G6 SENSOR) MISC           [facility] [id number] Kory Roberts, [address]   [address] [place] [address]   Phone:  [phone]  Fax:  [phone]

## 2022-06-20 RX ORDER — BLOOD-GLUCOSE SENSOR
1 EACH MISCELLANEOUS DAILY
Qty: 3 EACH | Refills: 0 | Status: SHIPPED | OUTPATIENT
Start: 2022-06-20 | End: 2022-07-18

## 2022-06-23 ENCOUNTER — OFFICE VISIT (OUTPATIENT)
Dept: ENDOCRINOLOGY | Age: 57
End: 2022-06-23
Payer: COMMERCIAL

## 2022-06-23 VITALS
WEIGHT: 197.2 LBS | OXYGEN SATURATION: 97 % | DIASTOLIC BLOOD PRESSURE: 68 MMHG | SYSTOLIC BLOOD PRESSURE: 108 MMHG | BODY MASS INDEX: 30.95 KG/M2 | HEART RATE: 87 BPM | HEIGHT: 67 IN

## 2022-06-23 DIAGNOSIS — Z96.41 INSULIN PUMP IN PLACE: ICD-10-CM

## 2022-06-23 PROCEDURE — G8427 DOCREV CUR MEDS BY ELIG CLIN: HCPCS | Performed by: INTERNAL MEDICINE

## 2022-06-23 PROCEDURE — 3017F COLORECTAL CA SCREEN DOC REV: CPT | Performed by: INTERNAL MEDICINE

## 2022-06-23 PROCEDURE — G8417 CALC BMI ABV UP PARAM F/U: HCPCS | Performed by: INTERNAL MEDICINE

## 2022-06-23 PROCEDURE — 1036F TOBACCO NON-USER: CPT | Performed by: INTERNAL MEDICINE

## 2022-06-23 PROCEDURE — 99214 OFFICE O/P EST MOD 30 MIN: CPT | Performed by: INTERNAL MEDICINE

## 2022-06-23 PROCEDURE — 3052F HG A1C>EQUAL 8.0%<EQUAL 9.0%: CPT | Performed by: INTERNAL MEDICINE

## 2022-06-23 PROCEDURE — 2022F DILAT RTA XM EVC RTNOPTHY: CPT | Performed by: INTERNAL MEDICINE

## 2022-06-23 RX ORDER — INSULIN ASPART 100 [IU]/ML
INJECTION, SOLUTION INTRAVENOUS; SUBCUTANEOUS
Qty: 20 ML | Refills: 3 | Status: SHIPPED | OUTPATIENT
Start: 2022-06-23

## 2022-06-23 NOTE — PROGRESS NOTES
Seen as f/u patient for diabetes    Interim:   Seen after 4/21  Reports had low glucose at home  Difficult bring it back up, went to hospital  Pump rates changed  Using Control -IQ  Lows after bolus with meals  Needs A1c < 8 % for tranplant    Will be evaluated for kidney / pancreas transplant  ESRD  On dialysis - PD  Back on pump    Reports had some bruising swelling in Pectoral area  Seen cardiology    2/19 ER visit for hypoglycemia- ate less  10/19 DKA- pump not working  States lost insurance  Has dexcom/t slim    Diagnosed with Type 1 diabetes mellitus since age 17    Known diabetic complications: Nephropathy, Retinopathy, Neuropathy  Severe, uncontrolled    Current diabetic medications     New settings:    Basal   0.5  I:C  1:10  CF  1:50       MN  0.925---> 0.95   4am  0.875   6am 0.95  10:30 am  0.85   1pm  0.925    3pm 0.925   6pm  0.975---> 1.0       CF   50  MN I :C  1:10     Target 100    Previous:  Lantus 30 units HS  Humalog <140  No insulin  2 for 50 >150    Last A1c  8.7%<---- 7%<----7.7%<----10.3%<------11.5 % on 7/19<------ 8.4%<----8.2%<-----8% <------9% on 5/17<----8.3 on 12/16<------8.3 on 3/16<--- 9.0 on 8/15<------8.6 on 5/15<----- 9.8 on 3/15<---9.8<---10.0    Prior visit with dietician: None since Age 17  Current diet: on average, 5 meals per day not familiar with CHO counting, seeing dietician, eats a snack at night as awake/has insomnia  Current exercise: walking   Current monitoring regimen: home blood tests -6-7  times daily     Has brought blood glucose log/meter: yes  Home blood sugar records:  Dexcom download  Unable to download    Any episodes of hypoglycemia? 58  Has brittle DM    H/o CAD s/p CABG 2001    Hyperlipidemia:controlled, on statin   LDL 71 on 3/15  crestor 40mg  LDL 54 on 5/20  Last eye exam: 6/20  Last foot exam: 2/21  Last microalbumin to creatinine ratio:ESRD    HTN: for years, uncontrolled  ACE-I or ARB:  coreg 50/25  Hydralazine 100mg TID  norvasc 5mg  LAURA Ab 25 H    FH: Mom- CAD  Dad  DM, CAD  at 61 age  [de-identified] had MS,  in 46s    Past Medical History:   Diagnosis Date    [facility]     Cardiomyopathy ([facility]     Carotid artery stenosis 10/25/2016    SUZANNA stented with 8 x 30 mm non-tapered Xact stent    CHF (congestive heart failure) ([facility] 3/3/15    EF 30%     CKD (chronic kidney disease) stage 2, GFR 60-89 ml/min     Coronary artery disease     sp CABG [facility]    [facility] peripheral [facility]     Diastolic HF (heart failure) ([facility])     Hyperlipidemia with target LDL less than 70     Hypertension goal BP (blood pressure) < 130/80     PVD (peripheral vascular disease) ([facility])     Seizures ([facility])     Type 1 diabetes mellitus with chronic [facility]     c-peptide <0.1 in      Past Surgical History:   Procedure Laterality Date    CARDIAC CATHETERIZATION      CARDIAC SURGERY      triple bypass    CAROTID STENT PLACEMENT Right     CORONARY ARTERY BYPASS GRAFT      HERNIA REPAIR      LAPAROSCOPY INSERTION PERITONEAL CATHETER N/A 6/3/2020    LAPAROSCOPIC PLACEMENT OF PERITONEAL DIALYSIS  CATHETER performed by Anjali Kyle MD at [address] [place] [address]       Current Outpatient Medications   Medication Sig Dispense Refill    Continuous Blood Gluc Sensor (DEXCOM G6 SENSOR) MISC 1 Device by Does not apply route daily 3 each 0    ezetimibe (ZETIA) 10 MG tablet TAKE ONE TABLET BY MOUTH DAILY 30 tablet 0    calcitRIOL (ROCALTROL) 0.25 MCG capsule Take 0.5 mcg by mouth daily      furosemide (LASIX) 80 MG tablet Take 80 mg by mouth 2 times daily      sacubitril-valsartan (ENTRESTO) 24-26 MG per tablet Take 1 tablet by mouth 2 times daily      nitroGLYCERIN (NITROSTAT) 0.4 MG SL tablet DISSOLVE 1 TAB UNDER TONGUE FOR CHEST PAIN - IF PAIN REMAINS AFTER 5 MIN, CALL 911 AND REPEAT DOSE.  MAX 3 TABS IN 15 MINUTES 25 tablet 2    hydrALAZINE (APRESOLINE) 100 MG tablet TAKE ONE TABLET BY MOUTH THREE TIMES A DAY 90 tablet 1    ranolazine (RANEXA) 500 MG extended release tablet TAKE ONE TABLET BY MOUTH TWICE A DAY 60 tablet 5    cloNIDine (CATAPRES) 0.1 MG tablet Take 0.1 mg by mouth 2 times daily       famotidine (PEPCID) 10 MG tablet Take 5 mg by mouth every 8 hours x 3 doses 5 tablet 0    clopidogrel (PLAVIX) 75 MG tablet TAKE ONE TABLET BY MOUTH DAILY 90 tablet 2    carvedilol (COREG) 25 MG tablet Take 1 tablet by mouth 2 times daily 60 tablet 5    isosorbide mononitrate (IMDUR) 30 MG extended release tablet Take 30 mg by mouth daily      aspirin 81 MG EC tablet Take 1 tablet by mouth daily 30 tablet 3    rosuvastatin (CRESTOR) 40 MG tablet Take 1 tablet by mouth nightly 30 tablet 3    Cholecalciferol (VITAMIN D3 ULTRA POTENCY) 1.25 MG (91383 UT) TABS Take 50,000 Units by mouth once a week (on Saturdays)      blood glucose test strips (ACCU-CHEK GUIDE) strip TEST BLOOD SUGAR 6 TIMES A  strip 5    Methylcobalamin (B-12) 1000 MCG TBDP Place 1,000 mcg under the tongue daily       No current facility-administered medications for this visit. Review of Systems      Scanned, reviewed      Vitals:    06/23/22 1200   BP: 108/68   Pulse: 87   SpO2: 97%       Constitutional: Well-developed, appears stated age, cooperative, in no acute distress  H/E/N/M/T:atraumatic, normocephalic, external ears, nose, lips normal without lesions  Eyes: Lids, lashes, conjunctivae and sclerae normal, No proptosis, no redness  Neck: supple, symmetrical, no swelling  Skin: No obvious rashes or lesions present.   Skin and hair texture normal  Psychiatric: Judgement and Insight:  judgement and insight appear normal  Neuro: Normal without focal findings, speech is normal normal, speech is spontaneous  Chest: No labored breathing, no chest deformity, no stridor  Musculoskeletal: No joint deformity, swelling      2/21  Skeletal foot exam is normal, no skin lesions, toenails are normal,10 g monofilament is decreased    Lab Reviewed   No components found for: CHLPL  Lab Results   Component Value Date    TRIG 85 03/02/2021    TRIG 69 05/16/2020    TRIG 75 07/25/2019     Lab Results   Component Value Date    HDL 40 03/02/2021    HDL 47 05/16/2020    HDL 45 07/25/2019     Lab Results   Component Value Date    LDLCALC 153 (H) 03/02/2021    LDLCALC 54 05/16/2020    LDLCALC 125 (H) 07/25/2019     Lab Results   Component Value Date    LABVLDL 17 03/02/2021    LABVLDL 14 05/16/2020    LABVLDL 15 07/25/2019     Lab Results   Component Value Date    LABA1C 8.7 05/12/2022       Assessment:     Sheridan Nova is a 56 y.o. male with :    1.T1DM:Longstanding, uncontrolled, has complications. Saw educator. Started on insulin pump/sensor. Needs to bolus regularly. Advise to bolus after meal if not sure of intake. Goal 7-8%  Does have T slim with Dexcom with Control IQ to help prevent lows. Lost to f/u. Using cotrnol IQ. Given post meal low at time, increase I:C  Advised needs to bolus regularly  2. HTN: BP at goal  3. HLD: LDL at goal  4. Obesity  5. CKD: ESRD, on PD  6. Neuropathy  7. CAD: S/p CABG  8. CHF: EF 30%  9. PVD: sees Dr. Rodriguez Dec  10. KELLY: Right 90% occlusion  11. Insulin pump in place: He has T Slim, with Dexcom    Plan:      MN  0.5   I:C 10---> 12   Advise to check blood sugar 4-6 times a day   Patient to send blood sugar log for titration. Advise to low simple carbohydrate and protein with each  meal diet. Diabetes Care: recommend yearly eye exam, foot exam and urine microalbumin to   creatinine ratio.  Patient is up-to-date.       -Hyperlipidemia, LDL goal is  <70 mg/dl.   -Hypertension: follow with nephrology  -Daily ASA: 81mg daily  -Smoking status: Non smoker

## 2022-07-16 DIAGNOSIS — E10.22 TYPE 1 DIABETES MELLITUS WITH STAGE 4 CHRONIC KIDNEY DISEASE (HCC): ICD-10-CM

## 2022-07-16 DIAGNOSIS — N18.4 TYPE 1 DIABETES MELLITUS WITH STAGE 4 CHRONIC KIDNEY DISEASE (HCC): ICD-10-CM

## 2022-07-18 RX ORDER — BLOOD-GLUCOSE SENSOR
EACH MISCELLANEOUS
Qty: 3 EACH | Refills: 0 | Status: SHIPPED | OUTPATIENT
Start: 2022-07-18 | End: 2022-08-17

## 2022-07-21 RX ORDER — EZETIMIBE 10 MG/1
TABLET ORAL
Qty: 30 TABLET | Refills: 0 | Status: SHIPPED | OUTPATIENT
Start: 2022-07-21 | End: 2022-08-30

## 2022-08-09 RX ORDER — CLONIDINE HYDROCHLORIDE 0.1 MG/1
TABLET ORAL
Qty: 60 TABLET | Refills: 5 | Status: SHIPPED | OUTPATIENT
Start: 2022-08-09

## 2022-08-15 DIAGNOSIS — N18.4 TYPE 1 DIABETES MELLITUS WITH STAGE 4 CHRONIC KIDNEY DISEASE (HCC): ICD-10-CM

## 2022-08-15 DIAGNOSIS — E10.22 TYPE 1 DIABETES MELLITUS WITH STAGE 4 CHRONIC KIDNEY DISEASE (HCC): ICD-10-CM

## 2022-08-17 RX ORDER — BLOOD-GLUCOSE SENSOR
EACH MISCELLANEOUS
Qty: 3 EACH | Refills: 0 | Status: SHIPPED | OUTPATIENT
Start: 2022-08-17 | End: 2022-09-14

## 2022-08-30 RX ORDER — EZETIMIBE 10 MG/1
TABLET ORAL
Qty: 30 TABLET | Refills: 0 | Status: SHIPPED | OUTPATIENT
Start: 2022-08-30

## 2022-09-14 DIAGNOSIS — N18.4 TYPE 1 DIABETES MELLITUS WITH STAGE 4 CHRONIC KIDNEY DISEASE (HCC): ICD-10-CM

## 2022-09-14 DIAGNOSIS — E10.22 TYPE 1 DIABETES MELLITUS WITH STAGE 4 CHRONIC KIDNEY DISEASE (HCC): ICD-10-CM

## 2022-09-14 RX ORDER — BLOOD-GLUCOSE SENSOR
EACH MISCELLANEOUS
Qty: 3 EACH | Refills: 0 | Status: SHIPPED | OUTPATIENT
Start: 2022-09-14 | End: 2022-10-17

## 2022-09-20 RX ORDER — CARVEDILOL 25 MG/1
25 TABLET ORAL 2 TIMES DAILY
Qty: 60 TABLET | Refills: 1 | Status: SHIPPED | OUTPATIENT
Start: 2022-09-20 | End: 2022-11-28

## 2022-10-01 ENCOUNTER — HOSPITAL ENCOUNTER (INPATIENT)
Age: 57
LOS: 2 days | Discharge: HOME | DRG: 100 | End: 2022-10-03
Attending: EMERGENCY MEDICINE | Admitting: STUDENT IN AN ORGANIZED HEALTH CARE EDUCATION/TRAINING PROGRAM
Payer: MEDICARE

## 2022-10-01 ENCOUNTER — APPOINTMENT (OUTPATIENT)
Dept: CT IMAGING | Age: 57
DRG: 100 | End: 2022-10-01
Payer: MEDICARE

## 2022-10-01 ENCOUNTER — APPOINTMENT (OUTPATIENT)
Dept: GENERAL RADIOLOGY | Age: 57
DRG: 100 | End: 2022-10-01
Payer: MEDICARE

## 2022-10-01 DIAGNOSIS — R56.9 SEIZURE (HCC): Primary | ICD-10-CM

## 2022-10-01 DIAGNOSIS — E87.5 HYPERKALEMIA: ICD-10-CM

## 2022-10-01 DIAGNOSIS — R07.9 CHEST PAIN, UNSPECIFIED TYPE: ICD-10-CM

## 2022-10-01 DIAGNOSIS — R73.9 HYPERGLYCEMIA: ICD-10-CM

## 2022-10-01 DIAGNOSIS — R53.1 GENERALIZED WEAKNESS: ICD-10-CM

## 2022-10-01 PROBLEM — I50.20 HFREF (HEART FAILURE WITH REDUCED EJECTION FRACTION) (HCC): Status: ACTIVE | Noted: 2022-10-01

## 2022-10-01 PROBLEM — E78.5 HYPERLIPIDEMIA DUE TO TYPE 1 DIABETES MELLITUS (HCC): Status: ACTIVE | Noted: 2022-10-01

## 2022-10-01 PROBLEM — E10.69 HYPERLIPIDEMIA DUE TO TYPE 1 DIABETES MELLITUS (HCC): Status: ACTIVE | Noted: 2022-10-01

## 2022-10-01 LAB
A/G RATIO: 1.6 (ref 1.1–2.2)
ACETAMINOPHEN LEVEL: <5 UG/ML (ref 10–30)
ALBUMIN SERPL-MCNC: 3.4 G/DL (ref 3.4–5)
ALBUMIN SERPL-MCNC: 4.6 G/DL (ref 3.4–5)
ALP BLD-CCNC: 123 U/L (ref 40–129)
ALT SERPL-CCNC: 17 U/L (ref 10–40)
AMMONIA: 22 UMOL/L (ref 16–60)
ANION GAP SERPL CALCULATED.3IONS-SCNC: 14 MMOL/L (ref 3–16)
ANION GAP SERPL CALCULATED.3IONS-SCNC: 15 MMOL/L (ref 3–16)
ANION GAP SERPL CALCULATED.3IONS-SCNC: 18 MMOL/L (ref 3–16)
ANISOCYTOSIS: ABNORMAL
AST SERPL-CCNC: 18 U/L (ref 15–37)
BASE EXCESS VENOUS: -1.2 MMOL/L (ref -3–3)
BASOPHILS ABSOLUTE: 0 K/UL (ref 0–0.2)
BASOPHILS RELATIVE PERCENT: 0.6 %
BETA-HYDROXYBUTYRATE: 0 MMOL/L (ref 0–0.27)
BILIRUB SERPL-MCNC: 0.5 MG/DL (ref 0–1)
BUN BLDV-MCNC: 59 MG/DL (ref 7–20)
BUN BLDV-MCNC: 64 MG/DL (ref 7–20)
BUN BLDV-MCNC: 70 MG/DL (ref 7–20)
CALCIUM SERPL-MCNC: 7.7 MG/DL (ref 8.3–10.6)
CALCIUM SERPL-MCNC: 8.6 MG/DL (ref 8.3–10.6)
CALCIUM SERPL-MCNC: 9.2 MG/DL (ref 8.3–10.6)
CARBOXYHEMOGLOBIN: 2.5 % (ref 0–1.5)
CHLORIDE BLD-SCNC: 90 MMOL/L (ref 99–110)
CHLORIDE BLD-SCNC: 95 MMOL/L (ref 99–110)
CHLORIDE BLD-SCNC: 97 MMOL/L (ref 99–110)
CO2: 18 MMOL/L (ref 21–32)
CO2: 19 MMOL/L (ref 21–32)
CO2: 24 MMOL/L (ref 21–32)
CREAT SERPL-MCNC: 7.2 MG/DL (ref 0.9–1.3)
CREAT SERPL-MCNC: 7.8 MG/DL (ref 0.9–1.3)
CREAT SERPL-MCNC: 8.1 MG/DL (ref 0.9–1.3)
EKG ATRIAL RATE: 109 BPM
EKG DIAGNOSIS: NORMAL
EKG P AXIS: 47 DEGREES
EKG P-R INTERVAL: 148 MS
EKG Q-T INTERVAL: 364 MS
EKG QRS DURATION: 96 MS
EKG QTC CALCULATION (BAZETT): 490 MS
EKG R AXIS: 54 DEGREES
EKG T AXIS: 40 DEGREES
EKG VENTRICULAR RATE: 109 BPM
EOSINOPHILS ABSOLUTE: 0.2 K/UL (ref 0–0.6)
EOSINOPHILS RELATIVE PERCENT: 3.6 %
ETHANOL: NORMAL MG/DL (ref 0–0.08)
GFR AFRICAN AMERICAN: 10
GFR AFRICAN AMERICAN: 8
GFR AFRICAN AMERICAN: 9
GFR NON-AFRICAN AMERICAN: 7
GFR NON-AFRICAN AMERICAN: 7
GFR NON-AFRICAN AMERICAN: 8
GLUCOSE BLD-MCNC: 124 MG/DL (ref 70–99)
GLUCOSE BLD-MCNC: 183 MG/DL (ref 70–99)
GLUCOSE BLD-MCNC: 214 MG/DL (ref 70–99)
GLUCOSE BLD-MCNC: 228 MG/DL (ref 70–99)
GLUCOSE BLD-MCNC: 235 MG/DL (ref 70–99)
GLUCOSE BLD-MCNC: 399 MG/DL (ref 70–99)
GLUCOSE BLD-MCNC: 506 MG/DL (ref 70–99)
GLUCOSE BLD-MCNC: 509 MG/DL (ref 70–99)
GLUCOSE BLD-MCNC: 513 MG/DL (ref 70–99)
GLUCOSE BLD-MCNC: 575 MG/DL (ref 70–99)
HCO3 VENOUS: 22.4 MMOL/L (ref 23–29)
HCT VFR BLD CALC: 43.7 % (ref 40.5–52.5)
HEMOGLOBIN, VEN, REDUCED: 8 %
HEMOGLOBIN: 13.9 G/DL (ref 13.5–17.5)
LIPASE: 53 U/L (ref 13–60)
LYMPHOCYTES ABSOLUTE: 1.2 K/UL (ref 1–5.1)
LYMPHOCYTES RELATIVE PERCENT: 20.3 %
MAGNESIUM: 1.8 MG/DL (ref 1.8–2.4)
MAGNESIUM: 1.9 MG/DL (ref 1.8–2.4)
MCH RBC QN AUTO: 29.5 PG (ref 26–34)
MCHC RBC AUTO-ENTMCNC: 31.9 G/DL (ref 31–36)
MCV RBC AUTO: 92.5 FL (ref 80–100)
METHEMOGLOBIN VENOUS: 0.8 %
MONOCYTES ABSOLUTE: 0.6 K/UL (ref 0–1.3)
MONOCYTES RELATIVE PERCENT: 10.9 %
NEUTROPHILS ABSOLUTE: 3.7 K/UL (ref 1.7–7.7)
NEUTROPHILS RELATIVE PERCENT: 64.6 %
O2 CONTENT, VEN: 17 VOL %
O2 SAT, VEN: 92 %
O2 THERAPY: ABNORMAL
OVALOCYTES: ABNORMAL
PCO2, VEN: 33.4 MMHG (ref 40–50)
PDW BLD-RTO: 14 % (ref 12.4–15.4)
PERFORMED ON: ABNORMAL
PH VENOUS: 7.43 (ref 7.35–7.45)
PHOSPHORUS: 2.4 MG/DL (ref 2.5–4.9)
PLATELET # BLD: 101 K/UL (ref 135–450)
PLATELET SLIDE REVIEW: ABNORMAL
PMV BLD AUTO: 10.9 FL (ref 5–10.5)
PO2, VEN: 61.5 MMHG (ref 25–40)
POTASSIUM REFLEX MAGNESIUM: 7 MMOL/L (ref 3.5–5.1)
POTASSIUM SERPL-SCNC: 4.4 MMOL/L (ref 3.5–5.1)
POTASSIUM SERPL-SCNC: 4.5 MMOL/L (ref 3.5–5.1)
POTASSIUM SERPL-SCNC: 6.4 MMOL/L (ref 3.5–5.1)
PRO-BNP: 1471 PG/ML (ref 0–124)
RBC # BLD: 4.73 M/UL (ref 4.2–5.9)
REASON FOR REJECTION: NORMAL
REJECTED TEST: NORMAL
SALICYLATE, SERUM: <0.3 MG/DL (ref 15–30)
SLIDE REVIEW: ABNORMAL
SODIUM BLD-SCNC: 126 MMOL/L (ref 136–145)
SODIUM BLD-SCNC: 131 MMOL/L (ref 136–145)
SODIUM BLD-SCNC: 133 MMOL/L (ref 136–145)
TCO2 CALC VENOUS: 53 MMOL/L
TOTAL CK: 218 U/L (ref 39–308)
TOTAL PROTEIN: 7.4 G/DL (ref 6.4–8.2)
TROPONIN: 0.05 NG/ML
TROPONIN: 0.1 NG/ML
WBC # BLD: 5.7 K/UL (ref 4–11)

## 2022-10-01 PROCEDURE — 6360000002 HC RX W HCPCS: Performed by: EMERGENCY MEDICINE

## 2022-10-01 PROCEDURE — 96375 TX/PRO/DX INJ NEW DRUG ADDON: CPT

## 2022-10-01 PROCEDURE — 99223 1ST HOSP IP/OBS HIGH 75: CPT | Performed by: PSYCHIATRY & NEUROLOGY

## 2022-10-01 PROCEDURE — 83036 HEMOGLOBIN GLYCOSYLATED A1C: CPT

## 2022-10-01 PROCEDURE — 6370000000 HC RX 637 (ALT 250 FOR IP): Performed by: STUDENT IN AN ORGANIZED HEALTH CARE EDUCATION/TRAINING PROGRAM

## 2022-10-01 PROCEDURE — 6370000000 HC RX 637 (ALT 250 FOR IP): Performed by: INTERNAL MEDICINE

## 2022-10-01 PROCEDURE — 83690 ASSAY OF LIPASE: CPT

## 2022-10-01 PROCEDURE — 83880 ASSAY OF NATRIURETIC PEPTIDE: CPT

## 2022-10-01 PROCEDURE — 6360000002 HC RX W HCPCS: Performed by: STUDENT IN AN ORGANIZED HEALTH CARE EDUCATION/TRAINING PROGRAM

## 2022-10-01 PROCEDURE — 85025 COMPLETE CBC W/AUTO DIFF WBC: CPT

## 2022-10-01 PROCEDURE — 99285 EMERGENCY DEPT VISIT HI MDM: CPT

## 2022-10-01 PROCEDURE — 2140000000 HC CCU INTERMEDIATE R&B

## 2022-10-01 PROCEDURE — 2580000003 HC RX 258: Performed by: STUDENT IN AN ORGANIZED HEALTH CARE EDUCATION/TRAINING PROGRAM

## 2022-10-01 PROCEDURE — 82550 ASSAY OF CK (CPK): CPT

## 2022-10-01 PROCEDURE — 80053 COMPREHEN METABOLIC PANEL: CPT

## 2022-10-01 PROCEDURE — 83735 ASSAY OF MAGNESIUM: CPT

## 2022-10-01 PROCEDURE — 84146 ASSAY OF PROLACTIN: CPT

## 2022-10-01 PROCEDURE — 93010 ELECTROCARDIOGRAM REPORT: CPT | Performed by: INTERNAL MEDICINE

## 2022-10-01 PROCEDURE — 6370000000 HC RX 637 (ALT 250 FOR IP): Performed by: EMERGENCY MEDICINE

## 2022-10-01 PROCEDURE — 92526 ORAL FUNCTION THERAPY: CPT

## 2022-10-01 PROCEDURE — 82010 KETONE BODYS QUAN: CPT

## 2022-10-01 PROCEDURE — 84443 ASSAY THYROID STIM HORMONE: CPT

## 2022-10-01 PROCEDURE — 94760 N-INVAS EAR/PLS OXIMETRY 1: CPT

## 2022-10-01 PROCEDURE — 96372 THER/PROPH/DIAG INJ SC/IM: CPT

## 2022-10-01 PROCEDURE — 84484 ASSAY OF TROPONIN QUANT: CPT

## 2022-10-01 PROCEDURE — 82803 BLOOD GASES ANY COMBINATION: CPT

## 2022-10-01 PROCEDURE — 82140 ASSAY OF AMMONIA: CPT

## 2022-10-01 PROCEDURE — 84132 ASSAY OF SERUM POTASSIUM: CPT

## 2022-10-01 PROCEDURE — 96365 THER/PROPH/DIAG IV INF INIT: CPT

## 2022-10-01 PROCEDURE — 92610 EVALUATE SWALLOWING FUNCTION: CPT

## 2022-10-01 PROCEDURE — 36415 COLL VENOUS BLD VENIPUNCTURE: CPT

## 2022-10-01 PROCEDURE — C9113 INJ PANTOPRAZOLE SODIUM, VIA: HCPCS | Performed by: STUDENT IN AN ORGANIZED HEALTH CARE EDUCATION/TRAINING PROGRAM

## 2022-10-01 PROCEDURE — 93005 ELECTROCARDIOGRAM TRACING: CPT | Performed by: EMERGENCY MEDICINE

## 2022-10-01 PROCEDURE — 71045 X-RAY EXAM CHEST 1 VIEW: CPT

## 2022-10-01 PROCEDURE — 70450 CT HEAD/BRAIN W/O DYE: CPT

## 2022-10-01 PROCEDURE — 80179 DRUG ASSAY SALICYLATE: CPT

## 2022-10-01 PROCEDURE — 82077 ASSAY SPEC XCP UR&BREATH IA: CPT

## 2022-10-01 PROCEDURE — 80143 DRUG ASSAY ACETAMINOPHEN: CPT

## 2022-10-01 RX ORDER — ASPIRIN 81 MG/1
81 TABLET, CHEWABLE ORAL DAILY
Status: DISCONTINUED | OUTPATIENT
Start: 2022-10-01 | End: 2022-10-03 | Stop reason: HOSPADM

## 2022-10-01 RX ORDER — ROSUVASTATIN CALCIUM 10 MG/1
10 TABLET, COATED ORAL NIGHTLY
Status: DISCONTINUED | OUTPATIENT
Start: 2022-10-01 | End: 2022-10-03 | Stop reason: HOSPADM

## 2022-10-01 RX ORDER — POLYETHYLENE GLYCOL 3350 17 G/17G
17 POWDER, FOR SOLUTION ORAL DAILY PRN
Status: DISCONTINUED | OUTPATIENT
Start: 2022-10-01 | End: 2022-10-03 | Stop reason: HOSPADM

## 2022-10-01 RX ORDER — CLOPIDOGREL BISULFATE 75 MG/1
75 TABLET ORAL DAILY
Status: DISCONTINUED | OUTPATIENT
Start: 2022-10-01 | End: 2022-10-03 | Stop reason: HOSPADM

## 2022-10-01 RX ORDER — ISOSORBIDE MONONITRATE 30 MG/1
30 TABLET, EXTENDED RELEASE ORAL DAILY
Status: DISCONTINUED | OUTPATIENT
Start: 2022-10-01 | End: 2022-10-03 | Stop reason: HOSPADM

## 2022-10-01 RX ORDER — ASPIRIN 81 MG/1
81 TABLET ORAL DAILY
Status: DISCONTINUED | OUTPATIENT
Start: 2022-10-01 | End: 2022-10-01

## 2022-10-01 RX ORDER — HYDRALAZINE HYDROCHLORIDE 100 MG/1
100 TABLET, FILM COATED ORAL EVERY 8 HOURS SCHEDULED
Status: DISCONTINUED | OUTPATIENT
Start: 2022-10-02 | End: 2022-10-03 | Stop reason: HOSPADM

## 2022-10-01 RX ORDER — ACETAMINOPHEN 650 MG/1
650 SUPPOSITORY RECTAL EVERY 6 HOURS PRN
Status: DISCONTINUED | OUTPATIENT
Start: 2022-10-01 | End: 2022-10-03 | Stop reason: HOSPADM

## 2022-10-01 RX ORDER — ONDANSETRON 2 MG/ML
4 INJECTION INTRAMUSCULAR; INTRAVENOUS
Status: DISCONTINUED | OUTPATIENT
Start: 2022-10-01 | End: 2022-10-03 | Stop reason: HOSPADM

## 2022-10-01 RX ORDER — MORPHINE SULFATE 4 MG/ML
4 INJECTION, SOLUTION INTRAMUSCULAR; INTRAVENOUS ONCE
Status: DISCONTINUED | OUTPATIENT
Start: 2022-10-01 | End: 2022-10-01

## 2022-10-01 RX ORDER — LORAZEPAM 2 MG/ML
1 INJECTION INTRAMUSCULAR EVERY 5 MIN PRN
Status: DISCONTINUED | OUTPATIENT
Start: 2022-10-01 | End: 2022-10-01

## 2022-10-01 RX ORDER — PANTOPRAZOLE SODIUM 40 MG/10ML
40 INJECTION, POWDER, LYOPHILIZED, FOR SOLUTION INTRAVENOUS DAILY
Status: DISCONTINUED | OUTPATIENT
Start: 2022-10-01 | End: 2022-10-03 | Stop reason: HOSPADM

## 2022-10-01 RX ORDER — SODIUM CHLORIDE, SODIUM LACTATE, POTASSIUM CHLORIDE, CALCIUM CHLORIDE 600; 310; 30; 20 MG/100ML; MG/100ML; MG/100ML; MG/100ML
INJECTION, SOLUTION INTRAVENOUS CONTINUOUS
Status: DISCONTINUED | OUTPATIENT
Start: 2022-10-01 | End: 2022-10-01

## 2022-10-01 RX ORDER — LEVETIRACETAM 5 MG/ML
500 INJECTION INTRAVASCULAR EVERY 12 HOURS
Status: DISCONTINUED | OUTPATIENT
Start: 2022-10-01 | End: 2022-10-01

## 2022-10-01 RX ORDER — MAGNESIUM SULFATE 1 G/100ML
1000 INJECTION INTRAVENOUS PRN
Status: DISCONTINUED | OUTPATIENT
Start: 2022-10-01 | End: 2022-10-03 | Stop reason: HOSPADM

## 2022-10-01 RX ORDER — SODIUM CHLORIDE 9 MG/ML
INJECTION, SOLUTION INTRAVENOUS CONTINUOUS
Status: DISCONTINUED | OUTPATIENT
Start: 2022-10-01 | End: 2022-10-01

## 2022-10-01 RX ORDER — ACETAMINOPHEN 325 MG/1
650 TABLET ORAL EVERY 6 HOURS PRN
Status: DISCONTINUED | OUTPATIENT
Start: 2022-10-01 | End: 2022-10-03 | Stop reason: HOSPADM

## 2022-10-01 RX ORDER — MORPHINE SULFATE 4 MG/ML
4 INJECTION, SOLUTION INTRAMUSCULAR; INTRAVENOUS
Status: DISCONTINUED | OUTPATIENT
Start: 2022-10-01 | End: 2022-10-01

## 2022-10-01 RX ORDER — FUROSEMIDE 10 MG/ML
40 INJECTION INTRAMUSCULAR; INTRAVENOUS 2 TIMES DAILY
Status: DISCONTINUED | OUTPATIENT
Start: 2022-10-01 | End: 2022-10-02

## 2022-10-01 RX ORDER — CALCITRIOL 0.25 UG/1
0.5 CAPSULE, LIQUID FILLED ORAL DAILY
Status: DISCONTINUED | OUTPATIENT
Start: 2022-10-01 | End: 2022-10-02

## 2022-10-01 RX ORDER — INSULIN LISPRO 100 [IU]/ML
0-8 INJECTION, SOLUTION INTRAVENOUS; SUBCUTANEOUS EVERY 4 HOURS
Status: DISCONTINUED | OUTPATIENT
Start: 2022-10-01 | End: 2022-10-01

## 2022-10-01 RX ORDER — DEXTROSE AND SODIUM CHLORIDE 5; .45 G/100ML; G/100ML
INJECTION, SOLUTION INTRAVENOUS CONTINUOUS PRN
Status: DISCONTINUED | OUTPATIENT
Start: 2022-10-01 | End: 2022-10-01

## 2022-10-01 RX ORDER — RANOLAZINE 500 MG/1
500 TABLET, EXTENDED RELEASE ORAL 2 TIMES DAILY
Status: DISCONTINUED | OUTPATIENT
Start: 2022-10-01 | End: 2022-10-03 | Stop reason: HOSPADM

## 2022-10-01 RX ORDER — POTASSIUM CHLORIDE 7.45 MG/ML
10 INJECTION INTRAVENOUS PRN
Status: DISCONTINUED | OUTPATIENT
Start: 2022-10-01 | End: 2022-10-01

## 2022-10-01 RX ORDER — INSULIN LISPRO 100 [IU]/ML
6 INJECTION, SOLUTION INTRAVENOUS; SUBCUTANEOUS
Status: DISCONTINUED | OUTPATIENT
Start: 2022-10-01 | End: 2022-10-02

## 2022-10-01 RX ORDER — INSULIN LISPRO 100 [IU]/ML
0-4 INJECTION, SOLUTION INTRAVENOUS; SUBCUTANEOUS NIGHTLY
Status: DISCONTINUED | OUTPATIENT
Start: 2022-10-01 | End: 2022-10-03 | Stop reason: HOSPADM

## 2022-10-01 RX ORDER — CLONIDINE HYDROCHLORIDE 0.1 MG/1
1 TABLET ORAL 2 TIMES DAILY
Status: DISCONTINUED | OUTPATIENT
Start: 2022-10-01 | End: 2022-10-01

## 2022-10-01 RX ORDER — GENTAMICIN SULFATE 1 MG/G
CREAM TOPICAL DAILY
Status: DISCONTINUED | OUTPATIENT
Start: 2022-10-01 | End: 2022-10-03 | Stop reason: HOSPADM

## 2022-10-01 RX ORDER — INSULIN GLARGINE 100 [IU]/ML
24 INJECTION, SOLUTION SUBCUTANEOUS DAILY
Status: DISCONTINUED | OUTPATIENT
Start: 2022-10-01 | End: 2022-10-02

## 2022-10-01 RX ORDER — CALCIUM GLUCONATE 20 MG/ML
1000 INJECTION, SOLUTION INTRAVENOUS ONCE
Status: DISCONTINUED | OUTPATIENT
Start: 2022-10-01 | End: 2022-10-03

## 2022-10-01 RX ORDER — SENNA AND DOCUSATE SODIUM 50; 8.6 MG/1; MG/1
2 TABLET, FILM COATED ORAL 2 TIMES DAILY
Status: DISCONTINUED | OUTPATIENT
Start: 2022-10-01 | End: 2022-10-03 | Stop reason: HOSPADM

## 2022-10-01 RX ORDER — HEPARIN SODIUM 5000 [USP'U]/ML
5000 INJECTION, SOLUTION INTRAVENOUS; SUBCUTANEOUS EVERY 8 HOURS SCHEDULED
Status: DISCONTINUED | OUTPATIENT
Start: 2022-10-01 | End: 2022-10-03 | Stop reason: HOSPADM

## 2022-10-01 RX ORDER — EZETIMIBE 10 MG/1
1 TABLET ORAL DAILY
Status: DISCONTINUED | OUTPATIENT
Start: 2022-10-01 | End: 2022-10-01 | Stop reason: RX

## 2022-10-01 RX ORDER — LANOLIN ALCOHOL/MO/W.PET/CERES
1000 CREAM (GRAM) TOPICAL DAILY
Status: DISCONTINUED | OUTPATIENT
Start: 2022-10-01 | End: 2022-10-03 | Stop reason: HOSPADM

## 2022-10-01 RX ORDER — LEVETIRACETAM 10 MG/ML
1000 INJECTION INTRAVASCULAR ONCE
Status: COMPLETED | OUTPATIENT
Start: 2022-10-01 | End: 2022-10-01

## 2022-10-01 RX ORDER — INSULIN LISPRO 100 [IU]/ML
0-8 INJECTION, SOLUTION INTRAVENOUS; SUBCUTANEOUS
Status: DISCONTINUED | OUTPATIENT
Start: 2022-10-01 | End: 2022-10-03 | Stop reason: HOSPADM

## 2022-10-01 RX ORDER — GAUZE BANDAGE 2" X 2"
100 BANDAGE TOPICAL DAILY
Status: DISCONTINUED | OUTPATIENT
Start: 2022-10-01 | End: 2022-10-03 | Stop reason: HOSPADM

## 2022-10-01 RX ORDER — CARVEDILOL 25 MG/1
25 TABLET ORAL 2 TIMES DAILY WITH MEALS
Status: DISCONTINUED | OUTPATIENT
Start: 2022-10-01 | End: 2022-10-03 | Stop reason: HOSPADM

## 2022-10-01 RX ORDER — DEXTROSE MONOHYDRATE 100 MG/ML
INJECTION, SOLUTION INTRAVENOUS CONTINUOUS PRN
Status: DISCONTINUED | OUTPATIENT
Start: 2022-10-01 | End: 2022-10-03 | Stop reason: HOSPADM

## 2022-10-01 RX ADMIN — LEVETIRACETAM 1000 MG: 10 INJECTION INTRAVENOUS at 04:53

## 2022-10-01 RX ADMIN — CYANOCOBALAMIN TAB 1000 MCG 1000 MCG: 1000 TAB at 12:20

## 2022-10-01 RX ADMIN — STANDARDIZED SENNA CONCENTRATE AND DOCUSATE SODIUM 2 TABLET: 8.6; 5 TABLET ORAL at 21:31

## 2022-10-01 RX ADMIN — Medication 100 MG: at 12:20

## 2022-10-01 RX ADMIN — SACUBITRIL AND VALSARTAN 1 TABLET: 24; 26 TABLET, FILM COATED ORAL at 21:31

## 2022-10-01 RX ADMIN — RANOLAZINE 500 MG: 500 TABLET, FILM COATED, EXTENDED RELEASE ORAL at 21:31

## 2022-10-01 RX ADMIN — CARVEDILOL 25 MG: 25 TABLET, FILM COATED ORAL at 12:19

## 2022-10-01 RX ADMIN — ASPIRIN 81 MG 81 MG: 81 TABLET ORAL at 12:20

## 2022-10-01 RX ADMIN — CARVEDILOL 25 MG: 25 TABLET, FILM COATED ORAL at 21:31

## 2022-10-01 RX ADMIN — PANTOPRAZOLE SODIUM 40 MG: 40 INJECTION, POWDER, FOR SOLUTION INTRAVENOUS at 12:18

## 2022-10-01 RX ADMIN — INSULIN GLARGINE 24 UNITS: 100 INJECTION, SOLUTION SUBCUTANEOUS at 22:03

## 2022-10-01 RX ADMIN — ONDANSETRON 4 MG: 2 INJECTION INTRAMUSCULAR; INTRAVENOUS at 06:17

## 2022-10-01 RX ADMIN — ROSUVASTATIN 10 MG: 10 TABLET, FILM COATED ORAL at 21:31

## 2022-10-01 RX ADMIN — GENTAMICIN SULFATE: 1 CREAM TOPICAL at 21:33

## 2022-10-01 RX ADMIN — SODIUM CHLORIDE 0.1 UNITS/KG/HR: 9 INJECTION, SOLUTION INTRAVENOUS at 07:43

## 2022-10-01 RX ADMIN — INSULIN LISPRO 5 UNITS: 100 INJECTION, SOLUTION INTRAVENOUS; SUBCUTANEOUS at 17:03

## 2022-10-01 RX ADMIN — RANOLAZINE 500 MG: 500 TABLET, FILM COATED, EXTENDED RELEASE ORAL at 12:20

## 2022-10-01 RX ADMIN — HEPARIN SODIUM 5000 UNITS: 5000 INJECTION INTRAVENOUS; SUBCUTANEOUS at 21:33

## 2022-10-01 RX ADMIN — INSULIN HUMAN 10 UNITS: 100 INJECTION, SOLUTION PARENTERAL at 06:15

## 2022-10-01 RX ADMIN — CLOPIDOGREL BISULFATE 75 MG: 75 TABLET ORAL at 12:20

## 2022-10-01 RX ADMIN — HEPARIN SODIUM 5000 UNITS: 5000 INJECTION INTRAVENOUS; SUBCUTANEOUS at 14:00

## 2022-10-01 RX ADMIN — MORPHINE SULFATE 4 MG: 4 INJECTION, SOLUTION INTRAMUSCULAR; INTRAVENOUS at 06:17

## 2022-10-01 RX ADMIN — ISOSORBIDE MONONITRATE 30 MG: 30 TABLET, EXTENDED RELEASE ORAL at 12:20

## 2022-10-01 ASSESSMENT — ENCOUNTER SYMPTOMS
SORE THROAT: 0
ABDOMINAL PAIN: 0
SHORTNESS OF BREATH: 0
PHOTOPHOBIA: 0
VOMITING: 1
CONSTIPATION: 1
BACK PAIN: 0
EYE PAIN: 0
EYE REDNESS: 0
NAUSEA: 1
COUGH: 0

## 2022-10-01 ASSESSMENT — PAIN SCALES - GENERAL
PAINLEVEL_OUTOF10: 3
PAINLEVEL_OUTOF10: 0
PAINLEVEL_OUTOF10: 2
PAINLEVEL_OUTOF10: 2

## 2022-10-01 ASSESSMENT — PAIN - FUNCTIONAL ASSESSMENT: PAIN_FUNCTIONAL_ASSESSMENT: 0-10

## 2022-10-01 NOTE — PROGRESS NOTES
Patient arrived from ED to room 2912. Oriented to room and POC.  NSR.  VSS. Room air. Call light within reach. Insulin gtt intact.

## 2022-10-01 NOTE — ED NOTES
Report given to CVU RN. No further questions at this time.       Mitchell Brand, LAURA  10/01/22 [id number]

## 2022-10-01 NOTE — H&P
Hospital Medicine History & Physical        Name:  Ritika Serrano  /Age/Sex: 1965  (56 y.o. male)  MRN & CSN:  [id number] & [id number]     PCP: Severiano Plump, MD    Date of Admission: 10/1/2022    Date of Service: Pt seen/examined on 10/1/2022    Patient Status:  Inpatient - Patient will most likely require more than 48 hours of treatment and requires intensive medical treatment/monitoring     Chief Complaint:    Chief Complaint   Patient presents with    Chest Pain     Pt brought in by EMS for c/o CP. Pt is on peritoneal dialysis and was mid-run when he had crushing CP that went up into his neck and down his arm. Pt has cardiac hx including open heart surgery in . Pt on plavix. During triage pt had a witnessed seizure. Vitals as charted. History Of Present Illness:      56 y.o. male with PMHx of type 1 diabetes, hypertension, hyperlipidemia, ESRD on dialysis, HFrEF who presented to [facility] with complaints of chest pain. Patient states he was at home when he noticed some chest pain. He thought it was due to his elevated blood sugar which was in the 300s at home. He took some insulin at some food. He started his nighttime peritoneal dialysis, but his pain did not improve. It is sharp, constant pain in the middle of his chest.  It started to radiate to his left shoulder and cause some numbness and tingling in his fingers bilaterally. This sometimes happens when his sugars are out of control, but has not happened in quite some time. He does admit to some nausea and vomiting. He vomited approximately 4-5 times at home over the past 48 hours. No blood. Admits to some constipation, no diarrhea. No blood in the stool. No noticeable edema. While in the ER, patient had a witnessed seizure. He states he has had a history of seizures in the past when he was a child. His last seizure was when he was 8years old. He is not currently on any antiepileptics.   Did not lose bowel or bladder function. Occasional alcohol use. No tobacco or illicit drug use. Past Medical History:          Diagnosis Date    Angina at rest [facility])     Cardiomyopathy ([facility]     Carotid artery stenosis 10/25/2016    SUZANNA stented with 8 x 30 mm non-tapered Xact stent    CHF (congestive heart failure) ([facility] 3/3/15    EF 30%     CKD (chronic kidney disease) stage 2, GFR 60-89 ml/min     Coronary artery disease     sp CABG UC    Diabetic peripheral neuropathy ([facility])     Diastolic HF (heart failure) ([facility])     Hyperlipidemia with target LDL less than 70     Hypertension goal BP (blood pressure) < 130/80     PVD (peripheral vascular disease) ([facility])     Seizures ([facility])     Type 1 diabetes mellitus with chronic kidney disease ([facility])     c-peptide <0.1 in 2015       Past Surgical History:          Procedure Laterality Date    CARDIAC CATHETERIZATION      CARDIAC SURGERY      triple bypass    CAROTID STENT PLACEMENT Right     CORONARY ARTERY BYPASS GRAFT      HERNIA REPAIR      LAPAROSCOPY INSERTION PERITONEAL CATHETER N/A 6/3/2020    LAPAROSCOPIC PLACEMENT OF PERITONEAL DIALYSIS  CATHETER performed by Eleazar Marley MD at [address] [place] [address]         Medications Prior to Admission:      Prior to Admission medications    Medication Sig Start Date End Date Taking?  Authorizing Provider   carvedilol (COREG) 25 MG tablet Take 1 tablet by mouth 2 times daily 9/20/22   JAYLEN Boyer CNP   Continuous Blood Gluc Sensor (DEXCOM G6 SENSOR) MISC USE AS DIRECTED CHANGE EVERY 10 DAYS 9/14/22   Liborio Tsai MD   ezetimibe (ZETIA) 10 MG tablet TAKE ONE TABLET BY MOUTH DAILY 8/30/22   JAYLEN Boyer CNP   cloNIDine (CATAPRES) 0.1 MG tablet TAKE ONE TABLET BY MOUTH TWICE A DAY 8/9/22   Rhiannon Rojas MD   insulin aspart (NOVOLOG) 100 UNIT/ML injection vial 30-40 units daily via pump 6/23/22   Liborio Tsai MD   calcitRIOL (ROCALTROL) 0.25 MCG capsule Take 0.5 mcg by mouth daily    Historical Provider, MD   furosemide (LASIX) 80 MG tablet Take 80 mg by mouth 2 times daily    Historical Provider, MD   sacubitril-valsartan (ENTRESTO) 24-26 MG per tablet Take 1 tablet by mouth 2 times daily    Historical Provider, MD   nitroGLYCERIN (NITROSTAT) 0.4 MG SL tablet DISSOLVE 1 TAB UNDER TONGUE FOR CHEST PAIN - IF PAIN REMAINS AFTER 5 MIN, CALL 911 AND REPEAT DOSE. MAX 3 TABS IN 15 MINUTES 4/12/22   JAYLEN Brumfield CNP   hydrALAZINE (APRESOLINE) 100 MG tablet TAKE ONE TABLET BY MOUTH THREE TIMES A DAY 4/8/22   JAYLEN Brumfield CNP   ranolazine (RANEXA) 500 MG extended release tablet TAKE ONE TABLET BY MOUTH TWICE A DAY 3/28/22   JAYLEN Brumfield CNP   famotidine (PEPCID) 10 MG tablet Take 5 mg by mouth every 8 hours x 3 doses 2/24/22   Fiona Kirk MD   clopidogrel (PLAVIX) 75 MG tablet TAKE ONE TABLET BY MOUTH DAILY 2/4/22   Elif Carlos MD   isosorbide mononitrate (IMDUR) 30 MG extended release tablet Take 30 mg by mouth daily    Historical Provider, MD   aspirin 81 MG EC tablet Take 1 tablet by mouth daily 3/4/21   Sarthak Saleem MD   rosuvastatin (CRESTOR) 40 MG tablet Take 1 tablet by mouth nightly 3/3/21   Sarthak Saleem MD   Cholecalciferol (VITAMIN D3 ULTRA POTENCY) 1.25 MG (01539 UT) TABS Take 50,000 Units by mouth once a week (on Saturdays)    Historical Provider, MD   blood glucose test strips (ACCU-CHEK GUIDE) strip TEST BLOOD SUGAR 6 TIMES A DAY 7/30/20   Monique Flores MD   Methylcobalamin (B-12) 1000 MCG TBDP Place 1,000 mcg under the tongue daily    Historical Provider, MD       Allergies: Iodides, Shellfish-derived products, and Atorvastatin calcium    Social History:      The patient currently lives at home with his wife. TOBACCO:   reports that he has never smoked. He has never used smokeless tobacco.  ETOH:   reports current alcohol use.   E-cigarette/Vaping       Questions Responses    E-cigarette/Vaping Use Never User    Start Date     Passive Exposure     Quit Date     Counseling Given Yes    Comments               Family History:      Reviewed and negative in regards to presenting illness/complaint. Problem Relation Age of Onset    Coronary Art Dis Father     Diabetes Father     Coronary Art Dis Paternal Grandmother     Diabetes Paternal Grandmother     Hypertension Mother     Other Mother         Epilepsy    Other Sister         Thyroid disease  Heart murmur    Mult Sclerosis Brother     Arthritis Brother     Sleep Apnea Brother        REVIEW OF SYSTEMS COMPLETED:   Pertinent positives as noted in the HPI. All other systems reviewed and negative. PHYSICAL EXAM PERFORMED:    BP (!) 135/48   Pulse 83   Temp 98.1 °F (36.7 °C)   Resp 19   Ht 5' 7\" (1.702 m)   Wt 192 lb (87.1 kg)   SpO2 100%   BMI 30.07 kg/m²     General appearance: In moderate distress, appears stated age and cooperative. Chronically ill-appearing. HEENT:  Normal cephalic, atraumatic without obvious deformity. Pupils equal, round, and reactive to light. Extra ocular muscles intact. Conjunctivae/corneas clear. Semiedentulous. Neck: Supple, with full range of motion. No jugular venous distention. Trachea midline. Respiratory:  Normal respiratory effort. Clear to auscultation, bilaterally without Rales/Wheezes/Rhonchi. Cardiovascular:  Regular rate and rhythm with normal S1/S2 without murmurs, rubs or gallops. No peripheral edema. Chest tender to palpation left side. Abdomen: Soft, non-tender, non-distended with normal bowel sounds. : No CVA tenderness  Musculoskeletal:  No clubbing or cyanosis. Full range of motion without deformity. Skin: Skin color, texture, turgor normal.  No rashes or lesions. Neurologic:  Neurovascularly intact without any focal sensory/motor deficits.  Cranial nerves: II-XII intact, grossly non-focal.  Psychiatric:  Alert and oriented, thought content appropriate, normal insight  Peripheral Pulses: +2 palpable, equal bilaterally       Labs:     Recent Labs 10/01/22  0356   WBC 5.7   HGB 13.9   HCT 43.7   *     Recent Labs     10/01/22  0356 10/01/22  0445   *  --    K 7.0* 6.4*   CL 90*  --    CO2 18*  --    BUN 59*  --    CREATININE 7.2*  --    CALCIUM 9.2  --      Recent Labs     10/01/22  0356   AST 18   ALT 17   BILITOT 0.5   ALKPHOS 123     No results for input(s): INR in the last 72 hours. Recent Labs     10/01/22  0356   TROPONINI 0.05*       Urinalysis:      Lab Results   Component Value Date/Time    NITRU Negative 12/07/2021 05:08 AM    WBCUA 4 12/07/2021 05:08 AM    BACTERIA Rare 05/25/2010 07:19 PM    RBCUA 3 12/07/2021 05:08 AM    BLOODU TRACE 12/07/2021 05:08 AM    SPECGRAV 1.018 12/07/2021 05:08 AM    GLUCOSEU 100 12/07/2021 05:08 AM    GLUCOSEU >=1000 05/25/2010 07:19 PM       Radiology:     CXR: I have reviewed the CXR with the following interpretation: Nonacute  EKG:  I have reviewed the EKG with the following interpretation: Sinus tachycardia    XR CHEST PORTABLE   Final Result   Stable chest without acute cardiopulmonary process. CT head without contrast    (Results Pending)       Medications:  Not in a hospital admission.   Current Facility-Administered Medications   Medication Dose Route Frequency Provider Last Rate Last Admin    ondansetron (ZOFRAN) injection 4 mg  4 mg IntraVENous Q1H PRN Sav Reyez MD   4 mg at 10/01/22 0617    morphine injection 4 mg  4 mg IntraVENous Q1H PRN aSv Reyez MD   4 mg at 10/01/22 0617    sodium bicarbonate 8.4 % injection 25 mEq  25 mEq IntraVENous Once Sav Reyez MD        sodium zirconium cyclosilicate [facility]) oral suspension 5 g  5 g Oral TID Sav Reyez MD        calcium gluconate 1000 mg in sodium chloride 50 mL  1,000 mg IntraVENous Once Sav Reyez MD        LORazepam (ATIVAN) injection 1 mg  1 mg IntraVENous Q5 Min PRN Shakeel Lindsey DO        levETIRAcetam (KEPPRA) 500 mg/100 mL IVPB  500 mg IntraVENous Q12H Vega Blanchard         thiamine (B-1) injection 100 mg  100 mg IntraVENous Daily Vega Terlep, DO        dextrose bolus 10% 125 mL  125 mL IntraVENous PRN Richardean Jaycob, DO        Or    dextrose bolus 10% 250 mL  250 mL IntraVENous PRN Richardean Jaycob, DO        potassium chloride 10 mEq/100 mL IVPB (Peripheral Line)  10 mEq IntraVENous PRN Richardean Jaycob, DO        magnesium sulfate 1000 mg in dextrose 5% 100 mL IVPB  1,000 mg IntraVENous PRN Richardean Jaycob, DO        sodium phosphate 10 mmol in sodium chloride 0.9 % 250 mL IVPB  10 mmol IntraVENous PRN Richardean Jaycob, DO        Or    sodium phosphate 15 mmol in dextrose 5 % 250 mL IVPB  15 mmol IntraVENous PRN Richardean Jaycob, DO        Or    sodium phosphate 20 mmol in dextrose 5 % 500 mL IVPB  20 mmol IntraVENous PRN Vega Terlep, DO        dextrose 5 % and 0.45 % sodium chloride infusion   IntraVENous Continuous PRN Vega Terlep, DO        insulin regular (HUMULIN R;NOVOLIN R) 100 Units in sodium chloride 0.9 % 100 mL infusion  0.01-0.5 Units/kg/hr IntraVENous Continuous Richardean Jaycob, DO        lactated ringers infusion   IntraVENous Continuous Richardean Jaycob, DO        aspirin EC tablet 81 mg  81 mg Oral Daily Vega Terlep, DO        calcitRIOL (ROCALTROL) capsule 0.5 mcg  0.5 mcg Oral Daily Vega Terlep, DO        carvedilol (COREG) tablet 25 mg  25 mg Oral BID Richardean Jaycob, DO        cloNIDine (CATAPRES) tablet 0.1 mg  1 tablet Oral BID Richardean Jaycob, DO        clopidogrel (PLAVIX) tablet 75 mg  1 tablet Oral Daily Richardean Jaycob, DO        ezetimibe (ZETIA) tablet 10 mg  1 tablet Oral Daily Vega Terlep, DO        furosemide (LASIX) injection 40 mg  40 mg IntraVENous BID Richardean Jaycob, DO        [START ON 10/2/2022] hydrALAZINE (APRESOLINE) tablet 100 mg  100 mg Oral 3 times per day Richardean Jaycob, DO        isosorbide mononitrate (IMDUR) extended release tablet 30 mg  30 mg Oral Daily Vega Terlep, DO        B-12 TBDP 1,000 mcg  1,000 mcg SubLINGual Daily Richardean Jaycob, DO        ranolazine (RANEXA) extended release tablet 500 mg  1 tablet Oral BID Allie Guzman DO        rosuvastatin (CRESTOR) tablet 40 mg  40 mg Oral Nightly Vega Blanchard DO        sacubitril-valsartan (ENTRESTO) 24-26 MG per tablet 1 tablet  1 tablet Oral BID Vega Blanchard DO        heparin (porcine) injection 5,000 Units  5,000 Units SubCUTAneous 3 times per day Allie Guzman DO        polyethylene glycol (GLYCOLAX) packet 17 g  17 g Oral Daily PRN Allie Guzman DO        acetaminophen (TYLENOL) tablet 650 mg  650 mg Oral Q6H PRN Allie Guzman DO        Or    acetaminophen (TYLENOL) suppository 650 mg  650 mg Rectal Q6H PRN Allie Guzman DO        pantoprazole (PROTONIX) injection 40 mg  40 mg IntraVENous Daily Allie Guzman DO         Current Outpatient Medications   Medication Sig Dispense Refill    carvedilol (COREG) 25 MG tablet Take 1 tablet by mouth 2 times daily 60 tablet 1    Continuous Blood Gluc Sensor (DEXCOM G6 SENSOR) MISC USE AS DIRECTED CHANGE EVERY 10 DAYS 3 each 0    ezetimibe (ZETIA) 10 MG tablet TAKE ONE TABLET BY MOUTH DAILY 30 tablet 0    cloNIDine (CATAPRES) 0.1 MG tablet TAKE ONE TABLET BY MOUTH TWICE A DAY 60 tablet 5    insulin aspart (NOVOLOG) 100 UNIT/ML injection vial 30-40 units daily via pump 20 mL 3    calcitRIOL (ROCALTROL) 0.25 MCG capsule Take 0.5 mcg by mouth daily      furosemide (LASIX) 80 MG tablet Take 80 mg by mouth 2 times daily      sacubitril-valsartan (ENTRESTO) 24-26 MG per tablet Take 1 tablet by mouth 2 times daily      nitroGLYCERIN (NITROSTAT) 0.4 MG SL tablet DISSOLVE 1 TAB UNDER TONGUE FOR CHEST PAIN - IF PAIN REMAINS AFTER 5 MIN, CALL 911 AND REPEAT DOSE.  MAX 3 TABS IN 15 MINUTES 25 tablet 2    hydrALAZINE (APRESOLINE) 100 MG tablet TAKE ONE TABLET BY MOUTH THREE TIMES A DAY 90 tablet 1    ranolazine (RANEXA) 500 MG extended release tablet TAKE ONE TABLET BY MOUTH TWICE A DAY 60 tablet 5    famotidine (PEPCID) 10 MG tablet Take 5 mg by mouth every 8 hours x 3 doses 5 tablet 0    clopidogrel (PLAVIX) 75 MG tablet TAKE ONE TABLET BY MOUTH DAILY 90 tablet 2    isosorbide mononitrate (IMDUR) 30 MG extended release tablet Take 30 mg by mouth daily      aspirin 81 MG EC tablet Take 1 tablet by mouth daily 30 tablet 3    rosuvastatin (CRESTOR) 40 MG tablet Take 1 tablet by mouth nightly 30 tablet 3    Cholecalciferol (VITAMIN D3 ULTRA POTENCY) 1.25 MG (77663 UT) TABS Take 50,000 Units by mouth once a week (on Saturdays)      blood glucose test strips (ACCU-CHEK GUIDE) strip TEST BLOOD SUGAR 6 TIMES A  strip 5    Methylcobalamin (B-12) 1000 MCG TBDP Place 1,000 mcg under the tongue daily         Consults:    IP CONSULT TO NEPHROLOGY  IP CONSULT TO HOSPITALIST  IP CONSULT TO NEUROLOGY  IP CONSULT TO DIABETES EDUCATOR    ASSESSMENT:    Active Hospital Problems    Diagnosis Date Noted    Seizure ([facility] [R56.9] 10/01/2022     Priority: Medium    Hyperlipidemia due to type 1 diabetes mellitus ([facility] [E10.69, E78.5] 10/01/2022     Priority: Medium    Hyperkalemia [E87.5] 10/01/2022     Priority: Medium    HFrEF (heart failure with reduced ejection fraction) ([facility] [I50.20] 10/01/2022     Priority: Medium    ESRD on dialysis ([facility] [N18.6, Z99.2] 04/08/2021    DKA, type 1, not at goal [facility]) [E10.10] 10/23/2019    Diabetic peripheral neuropathy ([facility] [E11.42]     Hypertension due to end stage renal disease caused by type 1 diabetes mellitus, on dialysis ([facility] [E10.22, I12.0, Z99.2, N18.6]     DM type 1 (diabetes mellitus, type 1) ([facility] [E10.9] 05/26/2010         PLAN:  This is a 56 y.o. male who presented to [facility] and is being treated for:    Seizure  -CT head without contrast ordered  -Keppra given in ED  -Keppra 500 mg twice daily started  -Seizure precautions and work-up ordered  -Neurology consulted; appreciate recommendations  -SLP ordered given seizure  -NPO except meds; advance diet as tolerated    DKA-type 1 diabetes  -, anion gap 18, CO2 18, potassium 7.0  -Insulin [email or web address] [email or web address] given ESRD  -Every 4h labs  -Replace electrolytes as needed    ESRD on dialysis  -Potassium 7.0 on admission; 6.4 on repeat  -Insulin, glucose, bicarb, Lokelma given per nephrology  -Nephrology consulted; appreciate recommendations    HFrEF  -Echo 12/22/2021 showed LVEF 35-40% with G2 DD  -I/Os  -Hold Lasix for now given IVF  -Closely monitor IVF for DKA given heart failure    Hyperlipidemia  -Statin    Hypertension  -Continue home antihypertensives      DVT ppx: Heparin  GI ppx: PPI  Diet: Diet NPO Exceptions are: Sips of Water with Meds  Code Status: Full Code    PT/OT Eval Status: Ordered    Disposition -Home after medical management treatment       Liliya Pedro DO  10/1/2022  6:44 AM    Please note that some part of this chart was generated using Dragon dictation software. Although every effort was made to ensure the accuracy of this automated transcription, some errors in transcription may have occurred inadvertently. If you may need any clarification, please do not hesitate to contact me through [facility].

## 2022-10-01 NOTE — ACP (ADVANCE CARE PLANNING)
Advanced Care Planning Note. Purpose of Encounter: Advanced care planning in light of seizure, DKA, hyperkalemia  Parties In Attendance: Patient, wife (POA),   Decisional Capacity: Yes  Subjective: Chest pain. Seizure. Objective: Potassium 7.0  Goals of Care Determination: Patient/POA wishes to remain full code and seek full treatment  Plan: Insulin drip. CT head. Antibiotics. Nephrology consult. Neurology consult. IVF. Every 4 hours labs. Code Status: Full code   Time spent on Advanced care Plannin minutes  Advanced Care Planning Documents: Completed advanced directives on chart, wife is the POA.     Tommie Agrawal DO  10/1/2022 7:04 AM
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
## 2022-10-01 NOTE — ED PROVIDER NOTES
Emergency Physician Note        Note Open Time: 7:13 AM EDT    Chief Complaint  Chest Pain (Pt brought in by EMS for c/o CP. Pt is on peritoneal dialysis and was mid-run when he had crushing CP that went up into his neck and down his arm. Pt has cardiac hx including open heart surgery in 2001. Pt on plavix. During triage pt had a witnessed seizure. Vitals as charted.)       History of Present Illness  Azucena Merlin is a 56 y.o. male who presents to the ED for chest pain. Patient reports that he developed chest pain overnight that radiated into his neck and down his arm. He has history of coronary disease with CABG. He also has end-stage renal disease and is on peritoneal dialysis. He states when his sugars get too high he gets chest pain. Brought in by EMS. I was called to the bedside by the nurse because shortly after patient arrived he had a generalized tonic-clonic seizure in her presence and in my presence. History and review of systems limited by patient's seizure. I have reviewed the following from the nursing documentation:      Prior to Admission medications    Medication Sig Start Date End Date Taking?  Authorizing Provider   carvedilol (COREG) 25 MG tablet Take 1 tablet by mouth 2 times daily 9/20/22   JAYLEN Marshall CNP   Continuous Blood Gluc Sensor (DEXCOM G6 SENSOR) MISC USE AS DIRECTED CHANGE EVERY 10 DAYS 9/14/22   Ksenia Matute MD   ezetimibe (ZETIA) 10 MG tablet TAKE ONE TABLET BY MOUTH DAILY 8/30/22   JAYLEN Marshall CNP   cloNIDine (CATAPRES) 0.1 MG tablet TAKE ONE TABLET BY MOUTH TWICE A DAY 8/9/22   Jennifer Villar MD   insulin aspart (NOVOLOG) 100 UNIT/ML injection vial 30-40 units daily via pump 6/23/22   Ksenia Matute MD   calcitRIOL (ROCALTROL) 0.25 MCG capsule Take 0.5 mcg by mouth daily    Historical Provider, MD   furosemide (LASIX) 80 MG tablet Take 80 mg by mouth 2 times daily    Historical Provider, MD   sacubitril-valsartan (ENTRESTO) 24-26 MG per tablet Take 1 tablet by mouth 2 times daily    Historical Provider, MD   nitroGLYCERIN (NITROSTAT) 0.4 MG SL tablet DISSOLVE 1 TAB UNDER TONGUE FOR CHEST PAIN - IF PAIN REMAINS AFTER 5 MIN, CALL 911 AND REPEAT DOSE.  MAX 3 TABS IN 15 MINUTES 4/12/22   JAYLEN Wesley CNP   hydrALAZINE (APRESOLINE) 100 MG tablet TAKE ONE TABLET BY MOUTH THREE TIMES A DAY 4/8/22   JAYLEN Wesley CNP   ranolazine (RANEXA) 500 MG extended release tablet TAKE ONE TABLET BY MOUTH TWICE A DAY 3/28/22   JAYLEN Wesley CNP   famotidine (PEPCID) 10 MG tablet Take 5 mg by mouth every 8 hours x 3 doses 2/24/22   Cailin Young MD   clopidogrel (PLAVIX) 75 MG tablet TAKE ONE TABLET BY MOUTH DAILY 2/4/22   Donovan Natarajan MD   isosorbide mononitrate (IMDUR) 30 MG extended release tablet Take 30 mg by mouth daily    Historical Provider, MD   aspirin 81 MG EC tablet Take 1 tablet by mouth daily 3/4/21   Ananias Lombard, MD   rosuvastatin (CRESTOR) 40 MG tablet Take 1 tablet by mouth nightly 3/3/21   Ananias Lombard, MD   Cholecalciferol (VITAMIN D3 ULTRA POTENCY) 1.25 MG (52324 UT) TABS Take 50,000 Units by mouth once a week (on Saturdays)    Historical Provider, MD   blood glucose test strips (ACCU-CHEK GUIDE) strip TEST BLOOD SUGAR 6 TIMES A DAY 7/30/20   Tonja Solano MD   Methylcobalamin (B-12) 1000 MCG TBDP Place 1,000 mcg under the tongue daily    Historical Provider, MD       Allergies as of 10/01/2022 - Fully Reviewed 06/23/2022   Allergen Reaction Noted    Iodides Anaphylaxis 05/25/2010    Shellfish-derived products Anaphylaxis 03/02/2015    Atorvastatin calcium Rash 09/09/2005       Past Medical History:   Diagnosis Date    Angina at rest [facility])     Cardiomyopathy ([facility]     Carotid artery stenosis 10/25/2016    SUZANNA stented with 8 x 30 mm non-tapered Xact stent    CHF (congestive heart failure) ([facility] 3/3/15    EF 30%     CKD (chronic kidney disease) stage 2, GFR 60-89 ml/min     Coronary artery disease     sp CABG UC    Diabetic peripheral neuropathy (HCC)     Diastolic HF (heart failure) (HCC)     Hyperlipidemia with target LDL less than 70     Hypertension goal BP (blood pressure) < 130/80     PVD (peripheral vascular disease) (HCC)     Seizures (HCC)     Type 1 diabetes mellitus with chronic kidney disease (HCC)     c-peptide <0.1 in 2015        Surgical History:   Past Surgical History:   Procedure Laterality Date    CARDIAC CATHETERIZATION      CARDIAC SURGERY      triple bypass    CAROTID STENT PLACEMENT Right     CORONARY ARTERY BYPASS GRAFT      HERNIA REPAIR      LAPAROSCOPY INSERTION PERITONEAL CATHETER N/A 6/3/2020    LAPAROSCOPIC PLACEMENT OF PERITONEAL DIALYSIS  CATHETER performed by Renu Benítez MD at [address] [place] [address]          Family History:    Family History   Problem Relation Age of Onset    Coronary Art Dis Father     Diabetes Father     Coronary Art Dis Paternal Grandmother     Diabetes Paternal Grandmother     Hypertension Mother     Other Mother         Epilepsy    Other Sister         Thyroid disease  Heart murmur    Mult Sclerosis Brother     Arthritis Brother     Sleep Apnea Brother        Social History     Socioeconomic History    Marital status: Legally      Spouse name: Not on file    Number of children: 1    Years of education: Not on file    Highest education level: Not on file   Occupational History    Occupation: IT   Tobacco Use    Smoking status: Never    Smokeless tobacco: Never   Vaping Use    Vaping Use: Never used   Substance and Sexual Activity    Alcohol use: Yes     Comment: one drink a month    Drug use: No    Sexual activity: Yes     Partners: Female   Other Topics Concern    Not on file   Social History Narrative    Not on file     Social Determinants of Health     Financial Resource Strain: Not on file   Food Insecurity: Not on file   Transportation Needs: Not on file   Physical Activity: Not on file   Stress: Not on file   Social Connections: Not on file   Intimate Partner Violence: Not on file   Housing Stability: Not on file       Nursing notes reviewed. ED Triage Vitals   Enc Vitals Group      BP 10/01/22 0346 139/65      Heart Rate 10/01/22 0400 99      Resp 10/01/22 0400 23      Temp 10/01/22 0350 98.1 °F (36.7 °C)      Temp src --       SpO2 10/01/22 0400 100 %      Weight 10/01/22 0350 192 lb (87.1 kg)      Height 10/01/22 0350 5' 7\" (1.702 m)      Head Circumference --       Peak Flow --       Pain Score --       Pain Loc --       Pain Edu? --       Excl. in GC? --        GENERAL:  Awake, alert. Well developed, well nourished with no apparent distress. HENT:  Normocephalic, Atraumatic, moist mucous membranes. EYES:  Pupils equal round and reactive to light, Conjunctiva normal, extraocular movements normal.  NECK:  No meningeal signs, Supple. CHEST:  Regular rate and rhythm, chest wall non-tender. LUNGS:  Clear to auscultation bilaterally. ABDOMEN:  Soft, non-tender, no rebound, rigidity or guarding, non-distended, normal bowel sounds. No costovertebral angle tenderness to palpation. BACK:  No tenderness. EXTREMITIES:  Normal range of motion, no edema, no bony tenderness, no deformity, distal pulses present. SKIN: Warm, dry and intact. NEUROLOGIC: Normal mental status. Moving all extremities to command. LABS and DIAGNOSTIC RESULTS  EKG  The Ekg interpreted by me shows  sinus tachycardia, [id number]    Axis is   Normal  QTc is  normal  Intervals and Durations are unremarkable. ST Segments: no acute change  Delta waves, Brugada Syndrome, and Short WA are not present. No significant change from prior EKG dated 20 may 2022    RADIOLOGY  X-RAYS:  I have reviewed radiologic plain film image(s). ALL OTHER NON-PLAIN FILM IMAGES SUCH AS CT, ULTRASOUND AND MRI HAVE BEEN READ BY THE RADIOLOGIST. CT head without contrast   Final Result   Multifocal paranasal sinus disease.       Otherwise, no gross acute intracranial process. XR CHEST PORTABLE   Final Result   Stable chest without acute cardiopulmonary process.               LABS  Labs Reviewed   CBC WITH AUTO DIFFERENTIAL - Abnormal; Notable for the following components:       Result Value    Platelets 130 (*)     MPV 10.9 (*)     Anisocytosis Occasional (*)     Ovalocytes Occasional (*)     All other components within normal limits   COMPREHENSIVE METABOLIC PANEL W/ REFLEX TO MG FOR LOW K - Abnormal; Notable for the following components:    Sodium 126 (*)     Potassium reflex Magnesium 7.0 (*)     Chloride 90 (*)     CO2 18 (*)     Anion Gap 18 (*)     Glucose 513 (*)     BUN 59 (*)     Creatinine 7.2 (*)     GFR Non- 8 (*)     GFR  10 (*)     All other components within normal limits    Narrative:     CALL  Smith  [facility] tel. [phone],  Chemistry results called to and read back by LAURA Brice, 10/01/2022  04:25, by Steven Arce   TROPONIN - Abnormal; Notable for the following components:    Troponin 0.05 (*)     All other components within normal limits    Narrative:     uBrton Schneider  [facility] tel. [phone],  Chemistry results called to and read back by LAURA Brice, 10/01/2022  04:25, by [address] [place] [address] - Abnormal; Notable for the following components:    Acetaminophen Level <5 (*)     All other components within normal limits    Narrative:     CALL  Luis  [facility] tel. [phone],  Chemistry results called to and read back by LAURA Brice, 10/01/2022  99:29, by Rc Alamo - Abnormal; Notable for the following components:    Salicylate, Serum <5.7 (*)     All other components within normal limits    Narrative:     Burton Schneider  [facility] tel. [phone],  Chemistry results called to and read back by LAURA Brice, 10/01/2022  04:25, by Evelyn Langley - Abnormal; Notable for the following components:    Pro-BNP 1,471 (*)     All other components within normal limits    Narrative:     Burton Schneider [facility] tel. [phone],  Chemistry results called to and read back by LAURA Higgins, 10/01/2022  04:25, by Yevgeniy Sales   POTASSIUM - Abnormal; Notable for the following components:    Potassium 6.4 (*)     All other components within normal limits    Narrative:     CALL  Smith  [facility] tel. [phone],  Chemistry results called to and read back by LAURA Anthony, 10/01/2022  05:14, by [facility]   BLOOD GAS, VENOUS - Abnormal; Notable for the following components:    pCO2, Andrez 33.4 (*)     pO2, Andrez 61.5 (*)     HCO3, Venous 22.4 (*)     Carboxyhemoglobin 2.5 (*)     All other components within normal limits   ETHANOL    Narrative:     Mushtaqjose Ortega  [facility] tel. [phone],  Chemistry results called to and read back by LAURA Higgins, 10/01/2022  04:25, by Hai Reece    Narrative:     Mushtaqjose Ortega  [facility] tel. [phone],  Rejected K/Called to: MD Hernandez Like, 10/01/2022 06:03, by Tejas Holguin CANCELLED 10/01/2022 06:02, Rejected: Hemolyzed per md, i explained  that we accept a hemolysis index of up to 90 but he doen't buy it. do not reorder per MD hospitalist will reorder   URINALYSIS WITH REFLEX TO CULTURE   URINE DRUG SCREEN   LACTIC ACID   AMMONIA   TSH WITH REFLEX   PROLACTIN   PROTIME-INR   MAGNESIUM   MAGNESIUM   BETA-HYDROXYBUTYRATE   RENAL FUNCTION PANEL   RENAL FUNCTION PANEL   LIPASE   HEMOGLOBIN A1C   CK   MAGNESIUM   RENAL FUNCTION PANEL   POCT GLUCOSE   POCT GLUCOSE   POCT GLUCOSE   POCT GLUCOSE   POCT GLUCOSE   POCT GLUCOSE   POCT GLUCOSE   POCT GLUCOSE   POCT GLUCOSE   POCT GLUCOSE   POCT GLUCOSE   POCT GLUCOSE       MEDICAL DECISION MAKING        Patient was seizing when I entered the room and the activity continued for about a minute and then the patient became limp but continued to breathe spontaneously. After about a minute of that he suddenly became awake and agitated and confused and did not know where he was.   He was able to be reoriented and then began crying and stating that he had had a seizure since he was 8years old. He then gave a history of having epilepsy as a child following a head injury and seizures up until age 8 and then he grew out of them and did not require any antiepileptic drugs. He states he continues to have chest pain. On patient's laboratory work his potassium level is elevated but shows hemolysis. I discussed this with the lab and with Dr. Samm Song, nephrologist, and he recommends that I treat the patient. Currently the patient's EKG does not show any signs of critical hyperkalemia. Nephrologist plans to reinitiate PD as soon as the patient can make it to the floor. The total Critical Care time is 90 minutes which excludes separately billable procedures. The critical care was concerning treatment of seizure with Keppra and treatment of hyperkalemia with insulin, calcium gluconate, sodium bicarbonate and Lokelma. This time is exclusive of any time documented by any other providers. I spoke with Dr. Danny Lee. We thoroughly discussed the history, physical exam, laboratory and imaging studies, as well as, emergency department course. Based upon that discussion, we've decided to admit Nini Morel for further observation and evaluation of Te Garay's seizure. As I have deemed necessary from their history, physical, and studies, I have considered and evaluated Nini Morel for the following diagnoses:        FINAL IMPRESSION  1. Seizure ([address]    2. Chest pain, unspecified type    3. Hyperkalemia    4. Hyperglycemia        Vitals:  Blood pressure (!) 112/46, pulse 88, temperature 98.1 °F (36.7 °C), resp. rate 21, height 5' 7\" (1.702 m), weight 192 lb (87.1 kg), SpO2 100 %. Disposition  Pt is in stable condition upon Admit to telemetry. This chart was generated using the [address] [place] [address] [address] dictation system. I created this record but it may contain dictation errors.          Jv Jaquez MD  10/01/22 1043

## 2022-10-01 NOTE — CONSULTS
Nephrology Consult Note  [phone]  [phone]   [facility]           Reason for Consult: ESRD on PD, Hyperkalemia      HISTORY OF PRESENT ILLNESS:                The patient is a 56 y. o.male with significant past medical history of ESRD on PD, KELLY s/p SUZANNA stent, CAD/CABG/CHF DM II, HPL, PVD, seizures came in with chest pain. Says he was on his cycler for PD while he developed chest pain that radiated to his left shoulder.   Also reports of nausea, constipation and poor appetite, po intake for the past few days  In the ER, had a witnessed seizure  CXR with no acute changes  CT head with no acute process  Labs in the ER with hyperkalemia on hemolysed sample,  hyperglycemia with a blood glucose of 513, acidosis with a venous pH of 7.4,   Received medications however with repeat K of 4.4  We are consulted for ESRD management  He has been on Nocturnal Peritoneal dialysis and follows with Dr Ilda Burgos  Has been on Insulin gtt for possible DKA  Says his insulin pump may be malfunctioning    Past Medical History:        Diagnosis Date    Angina at rest [facility])     Cardiomyopathy ([facility]     Carotid artery stenosis 10/25/2016    SUZANNA stented with 8 x 30 mm non-tapered Xact stent    CHF (congestive heart failure) ([facility] 3/3/15    EF 30%     CKD (chronic kidney disease) stage 2, GFR 60-89 ml/min     Coronary artery disease     sp CABG UC    Diabetic peripheral neuropathy (HCC)     Diastolic HF (heart failure) (HCC)     Hyperlipidemia with target LDL less than 70     Hypertension goal BP (blood pressure) < 130/80     PVD (peripheral vascular disease) (HCC)     Seizures ([facility]     Type 1 diabetes mellitus with chronic kidney disease ([facility]     c-peptide <0.1 in 2015       Past Surgical History:        Procedure Laterality Date    CARDIAC CATHETERIZATION      CARDIAC SURGERY      triple bypass    CAROTID STENT PLACEMENT Right     CORONARY ARTERY BYPASS GRAFT      HERNIA REPAIR      LAPAROSCOPY INSERTION PERITONEAL CATHETER N/A 6/3/2020    LAPAROSCOPIC PLACEMENT OF PERITONEAL DIALYSIS  CATHETER performed by Ramandeep Reid MD at [address] [facility]           Current Medications:    No current facility-administered medications on file prior to encounter. Current Outpatient Medications on File Prior to Encounter   Medication Sig Dispense Refill    carvedilol (COREG) 25 MG tablet Take 1 tablet by mouth 2 times daily 60 tablet 1    Continuous Blood Gluc Sensor (DEXCOM G6 SENSOR) MISC USE AS DIRECTED CHANGE EVERY 10 DAYS 3 each 0    ezetimibe (ZETIA) 10 MG tablet TAKE ONE TABLET BY MOUTH DAILY 30 tablet 0    cloNIDine (CATAPRES) 0.1 MG tablet TAKE ONE TABLET BY MOUTH TWICE A DAY 60 tablet 5    insulin aspart (NOVOLOG) 100 UNIT/ML injection vial 30-40 units daily via pump 20 mL 3    calcitRIOL (ROCALTROL) 0.25 MCG capsule Take 0.5 mcg by mouth daily      furosemide (LASIX) 80 MG tablet Take 80 mg by mouth 2 times daily      sacubitril-valsartan (ENTRESTO) 24-26 MG per tablet Take 1 tablet by mouth 2 times daily      nitroGLYCERIN (NITROSTAT) 0.4 MG SL tablet DISSOLVE 1 TAB UNDER TONGUE FOR CHEST PAIN - IF PAIN REMAINS AFTER 5 MIN, CALL 911 AND REPEAT DOSE.  MAX 3 TABS IN 15 MINUTES 25 tablet 2    hydrALAZINE (APRESOLINE) 100 MG tablet TAKE ONE TABLET BY MOUTH THREE TIMES A DAY 90 tablet 1    ranolazine (RANEXA) 500 MG extended release tablet TAKE ONE TABLET BY MOUTH TWICE A DAY 60 tablet 5    famotidine (PEPCID) 10 MG tablet Take 5 mg by mouth every 8 hours x 3 doses 5 tablet 0    clopidogrel (PLAVIX) 75 MG tablet TAKE ONE TABLET BY MOUTH DAILY 90 tablet 2    isosorbide mononitrate (IMDUR) 30 MG extended release tablet Take 30 mg by mouth daily      aspirin 81 MG EC tablet Take 1 tablet by mouth daily 30 tablet 3    rosuvastatin (CRESTOR) 40 MG tablet Take 1 tablet by mouth nightly 30 tablet 3    Cholecalciferol (VITAMIN D3 ULTRA POTENCY) 1.25 MG (18176 UT) TABS Take 50,000 Units by mouth once a week (on Saturdays)      blood glucose test strips (ACCU-CHEK GUIDE) strip TEST BLOOD SUGAR 6 TIMES A  strip 5    Methylcobalamin (B-12) 1000 MCG TBDP Place 1,000 mcg under the tongue daily         Allergies: Iodides, Shellfish-derived products, and Atorvastatin calcium    Social History:    Social History     Socioeconomic History    Marital status: Legally      Spouse name: Not on file    Number of children: 1    Years of education: Not on file    Highest education level: Not on file   Occupational History    Occupation: IT   Tobacco Use    Smoking status: Never    Smokeless tobacco: Never   Vaping Use    Vaping Use: Never used   Substance and Sexual Activity    Alcohol use: Yes     Comment: one drink a month    Drug use: No    Sexual activity: Yes     Partners: Female   Other Topics Concern    Not on file   Social History Narrative    Not on file     Social Determinants of Health     Financial Resource Strain: Not on file   Food Insecurity: Not on file   Transportation Needs: Not on file   Physical Activity: Not on file   Stress: Not on file   Social Connections: Not on file   Intimate Partner Violence: Not on file   Housing Stability: Not on file       Family History:       Problem Relation Age of Onset    Coronary Art Dis Father     Diabetes Father     Coronary Art Dis Paternal Grandmother     Diabetes Paternal Grandmother     Hypertension Mother     Other Mother         Epilepsy    Other Sister         Thyroid disease  Heart murmur    Mult Sclerosis Brother     Arthritis Brother     Sleep Apnea Brother            Review of Systems   Constitutional:  Positive for activity change and appetite change. Negative for chills, fatigue and fever. HENT:  Negative for ear discharge, ear pain, sneezing and sore throat. Eyes:  Negative for photophobia, pain and redness. Respiratory:  Negative for cough and shortness of breath. Cardiovascular:  Positive for chest pain. Negative for palpitations and leg swelling.    Gastrointestinal: Positive for constipation, nausea and vomiting. Negative for abdominal pain. Genitourinary:  Negative for difficulty urinating, dysuria, hematuria and urgency. Musculoskeletal:  Negative for arthralgias and back pain. Skin:  Negative for pallor and rash. Neurological:  Positive for seizures. Negative for dizziness, tremors, syncope and headaches. Psychiatric/Behavioral:  Negative for confusion and hallucinations. PHYSICAL EXAM:      Vitals:    BP (!) 117/59   Pulse 83   Temp 98.1 °F (36.7 °C) (Temporal)   Resp 12   Ht 5' 7\" (1.702 m)   Wt 195 lb 12.3 oz (88.8 kg)   SpO2 98%   BMI 30.66 kg/m²   No intake/output data recorded. No intake/output data recorded. Physical Exam:  General : AAOx3, not in pain or respiratory distress, resting in bed  HEENT : normocephalic, atraumatic, mucosa moist, no palor or icterus  CVS: S1 S2 normal, regular rhythm, no murmurs or rubs. Lungs: Clear, no wheezing or crackles. Abd: Soft, bowel sounds normal, non-tender. Ext: No edema, no cyanosis  Skin: Warm. No rashes appreciated. : bladder non-distended, no tenderness over the bladder  Neuro: Alert and oriented x 3, nonfocal.  Joints: No erythema noted over joints.     Access : PD catheter+ site clean with no erythema or drainage    DATA:    CBC with Differential:    Lab Results   Component Value Date/Time    WBC 5.7 10/01/2022 03:56 AM    RBC 4.73 10/01/2022 03:56 AM    HGB 13.9 10/01/2022 03:56 AM    HCT 43.7 10/01/2022 03:56 AM     10/01/2022 03:56 AM    MCV 92.5 10/01/2022 03:56 AM    MCH 29.5 10/01/2022 03:56 AM    MCHC 31.9 10/01/2022 03:56 AM    RDW 14.0 10/01/2022 03:56 AM    SEGSPCT 85.2 06/15/2013 01:00 AM    LYMPHOPCT 20.3 10/01/2022 03:56 AM    MONOPCT 10.9 10/01/2022 03:56 AM    EOSPCT 2.1 09/28/2010 10:08 AM    BASOPCT 0.6 10/01/2022 03:56 AM    MONOSABS 0.6 10/01/2022 03:56 AM    LYMPHSABS 1.2 10/01/2022 03:56 AM    EOSABS 0.2 10/01/2022 03:56 AM    BASOSABS 0.0 10/01/2022 03:56 AM DIFFTYPE Scan-K 06/15/2013 01:00 AM     BMP:    Lab Results   Component Value Date/Time     10/01/2022 05:03 AM    K 4.4 10/01/2022 05:03 AM    K 7.0 10/01/2022 03:56 AM    CL 97 10/01/2022 05:03 AM    CO2 19 10/01/2022 05:03 AM    BUN 64 10/01/2022 05:03 AM    LABALBU 3.4 10/01/2022 05:03 AM    CREATININE 7.8 10/01/2022 05:03 AM    CALCIUM 7.7 10/01/2022 05:03 AM    GFRAA 9 10/01/2022 05:03 AM    GFRAA 44 06/15/2013 01:00 AM    LABGLOM 7 10/01/2022 05:03 AM    GLUCOSE 506 10/01/2022 05:03 AM     Ionized Calcium:  No results found for: IONCA  Magnesium:    Lab Results   Component Value Date/Time    MG 1.80 10/01/2022 05:03 AM     Phosphorus:    Lab Results   Component Value Date/Time    PHOS 2.4 10/01/2022 05:03 AM     U/A:    Lab Results   Component Value Date/Time    COLORU YELLOW 12/07/2021 05:08 AM    PROTEINU >=300 12/07/2021 05:08 AM    PHUR 5.5 12/07/2021 05:08 AM    WBCUA 4 12/07/2021 05:08 AM    RBCUA 3 12/07/2021 05:08 AM    MUCUS 1+ 05/25/2010 07:19 PM    BACTERIA Rare 05/25/2010 07:19 PM    CLARITYU CLOUDY 12/07/2021 05:08 AM    SPECGRAV 1.018 12/07/2021 05:08 AM    LEUKOCYTESUR Negative 12/07/2021 05:08 AM    UROBILINOGEN 0.2 12/07/2021 05:08 AM    BILIRUBINUR Negative 12/07/2021 05:08 AM    BILIRUBINUR NEGATIVE 05/25/2010 07:19 PM    BLOODU TRACE 12/07/2021 05:08 AM    GLUCOSEU 100 12/07/2021 05:08 AM    GLUCOSEU >=1000 05/25/2010 07:19 PM    AMORPHOUS 2+ 12/07/2021 05:08 AM     ABG:  No results found for: PH, PCO2, PO2, HCO3, BE, THGB, TCO2, O2SAT  VBG:    Lab Results   Component Value Date/Time    PHVEN 7.435 10/01/2022 04:55 AM    PLB0MEM 33.4 10/01/2022 04:55 AM    BEVEN -1.2 10/01/2022 04:55 AM    N8XBDPHA 92 10/01/2022 04:55 AM       ASSESSMENT/PLAN:      ESRD on NIPD  Continue cycler for 8 hours, use 1.5 % D, 3 exchanges   Hyperkalemia   On hemolysed sample  Can stop [facility]  DKA Type 1 DM   On Insulin gtt  Seizure  H/o childhood epilepsy  Per neurology  Secondary hyperparathyroidism  Continue calcitriol  Hypertension controlled  Continue same meds  DM I      Thank you for allowing me to participate in the care of this patient. I will continue to follow along. Please call with questions.     Claudia Coombs MD, MD.  Office Phone : [phone]  10/1/2022

## 2022-10-01 NOTE — CONSULTS
In patient Neurology consult        [facility]      Meyer Orange, MD Sherryle Dauer  1965    Date of Service: 10/1/2022    Referring Physician: Levy Holliday MD      Reason for the consult and CC: Breakthrough seizure    HPI:   The patient is a 56y.o.  years old male with history of childhood epilepsy, none since he was 8years old, history of diabetes, hypertension and end-stage renal disease who initially came into the emergency room last night for chest pain and elevated blood sugar level. In the ED, he had a witnessed seizure. Description generalized motor seizure for few minutes. No aura or warning. No bladder or bowel incontinence. Degree was severe. Mild postictal state. No clear triggers, relieving or aggravating factors. He was admitted to the ICU. Now he is awake and alert. No new symptoms. He does have family history of epilepsy. History of childhood seizure from trauma and his last seizure was when he was 8years old. When he came to the ED, his blood sugar was above 500. He was loaded with Keppra. CT head showed no acute findings. He had MRI of the brain last year which by his report showed no acute lesions. Denies any recent fever chills or head trauma. Other review of system was unremarkable.       Family History   Problem Relation Age of Onset    Coronary Art Dis Father     Diabetes Father     Coronary Art Dis Paternal Grandmother     Diabetes Paternal Grandmother     Hypertension Mother     Other Mother         Epilepsy    Other Sister         Thyroid disease  Heart murmur    Mult Sclerosis Brother     Arthritis Brother     Sleep Apnea Brother      Past Surgical History:   Procedure Laterality Date    CARDIAC CATHETERIZATION      CARDIAC SURGERY      triple bypass    CAROTID STENT PLACEMENT Right     CORONARY ARTERY BYPASS GRAFT      HERNIA REPAIR      LAPAROSCOPY INSERTION PERITONEAL CATHETER N/A 6/3/2020    LAPAROSCOPIC PLACEMENT OF PERITONEAL DIALYSIS CATHETER performed by Cece Love MD at [address] [facility]          Past Medical History:   Diagnosis Date    Angina at rest [facility])     Cardiomyopathy ([facility]     Carotid artery stenosis 10/25/2016    SUZANNA stented with 8 x 30 mm non-tapered Xact stent    CHF (congestive heart failure) ([facility] 3/3/15    EF 30%     CKD (chronic kidney disease) stage 2, GFR 60-89 ml/min     Coronary artery disease     sp CABG UC    Diabetic peripheral neuropathy ([facility])     Diastolic HF (heart failure) ([facility])     Hyperlipidemia with target LDL less than 70     Hypertension goal BP (blood pressure) < 130/80     PVD (peripheral vascular disease) ([facility])     Seizures ([facility]     Type 1 diabetes mellitus with chronic kidney disease ([facility]     c-peptide <0.1 in 2015     Social History     Tobacco Use    Smoking status: Never    Smokeless tobacco: Never   Vaping Use    Vaping Use: Never used   Substance Use Topics    Alcohol use: Yes     Comment: one drink a month    Drug use: No     Allergies   Allergen Reactions    Iodides Anaphylaxis    Shellfish-Derived Products Anaphylaxis    Atorvastatin Calcium Rash     Current Facility-Administered Medications   Medication Dose Route Frequency Provider Last Rate Last Admin    ondansetron (ZOFRAN) injection 4 mg  4 mg IntraVENous Q1H PRN Edilia Grace MD   4 mg at 10/01/22 0617    sodium bicarbonate 8.4 % injection 25 mEq  25 mEq IntraVENous Once Edilia Grace MD        sodium zirconium cyclosilicate (LOKELMA) oral suspension 5 g  5 g Oral TID Edilia Grace MD        calcium gluconate 1000 mg in sodium chloride 50 mL  1,000 mg IntraVENous Once Edilia Grace MD        levETIRAcetam (KEPPRA) 500 mg/100 mL IVPB  500 mg IntraVENous Q12H Paul Safe, DO        thiamine mononitrate tablet 100 mg  100 mg Oral Daily Vega Blanchard,         dextrose bolus 10% 125 mL  125 mL IntraVENous PRN Paul Safe, DO        Or    dextrose bolus 10% 250 mL  250 mL IntraVENous PRN Paul Safe, DO magnesium sulfate 1000 mg in dextrose 5% 100 mL IVPB  1,000 mg IntraVENous PRN Vega Blanchard DO        dextrose 5 % and 0.45 % sodium chloride infusion   IntraVENous Continuous PRN Renee Sanz MD        insulin regular (HUMULIN R;NOVOLIN R) 100 Units in sodium chloride 0.9 % 100 mL infusion  0.01-0.5 Units/kg/hr IntraVENous Continuous Pradeep Juares DO 4 mL/hr at 10/01/22 1027 0.046 Units/kg/hr at 10/01/22 1027    calcitRIOL (ROCALTROL) capsule 0.5 mcg  0.5 mcg Oral Daily Vega Blanchard DO        carvedilol (COREG) tablet 25 mg  25 mg Oral BID  Vega Blanchard DO        clopidogrel (PLAVIX) tablet 75 mg  75 mg Oral Daily Pradeep Juares DO        [Held by provider] furosemide (LASIX) injection 40 mg  40 mg IntraVENous BID Pradeep Juares DO        [START ON 10/2/2022] hydrALAZINE (APRESOLINE) tablet 100 mg  100 mg Oral 3 times per day Pradeep Juares DO        isosorbide mononitrate (IMDUR) extended release tablet 30 mg  30 mg Oral Daily Vega Blanchard DO        vitamin B-12 (CYANOCOBALAMIN) tablet 1,000 mcg  1,000 mcg Oral Daily Pradeep Juares DO        ranolazine (RANEXA) extended release tablet 500 mg  500 mg Oral BID Vega Blanchard DO        rosuvastatin (CRESTOR) tablet 40 mg  40 mg Oral Nightly Pradeep Juares DO        [Held by provider] sacubitril-valsartan (ENTRESTO) 24-26 MG per tablet 1 tablet  1 tablet Oral BID Vega Blanchard DO        heparin (porcine) injection 5,000 Units  5,000 Units SubCUTAneous 3 times per day Pradeep Juares DO        polyethylene glycol (GLYCOLAX) packet 17 g  17 g Oral Daily PRN Pradeep Juares DO        acetaminophen (TYLENOL) tablet 650 mg  650 mg Oral Q6H PRN Pradeep Juares DO        Or    acetaminophen (TYLENOL) suppository 650 mg  650 mg Rectal Q6H PRN Pradeep Juares, DO        pantoprazole (PROTONIX) injection 40 mg  40 mg IntraVENous Daily Pradeep Juares,         aspirin chewable tablet 81 mg  81 mg Oral Daily Renee Sanz MD           ROS : A 10-14 system review of constitutional, cardiovascular, respiratory, eyes, musculoskeletal, endocrine, GI, ENT, skin, hematological, genitourinary, psychiatric and neurologic systems was obtained and updated today and is unremarkable except as mentioned in my HPI      Exam:     Constitutional:   Vitals:    10/01/22 0630 10/01/22 0715 10/01/22 0800 10/01/22 0830   BP: (!) 112/46 128/61 130/75 124/65   Pulse: 88 82 97 100   Resp: 21 16  12   Temp:    98.1 °F (36.7 °C)   TempSrc:    Temporal   SpO2: 100% 95% 95% 98%   Weight:    195 lb 12.3 oz (88.8 kg)   Height:    5' 7\" (1.702 m)       General appearance and observation: Normal development and appear in no acute distress. Eye:  Fundus: No blurring of optic disc. Neck: supple  Cardiovascular: No lower leg edema with good pulsation. Mental Status:   Oriented to person, place, problem, and time. Memory: Good immediate recall. Intact remote memory  Normal attention span and concentration. Language: intact naming, repeating and fluency   Good fund of Knowledge. Aware of current events and vocabulary   Cranial Nerves:   II: Visual fields: Full. Pupils: equal, round, reactive to light  III,IV,VI: Extra Ocular Movements are intact. No nystagmus  V: Facial sensation is intact  VII: Facial strength and movements: intact and symmetric  VIII: Hearing: Intact  IX: Palate elevation is symmetric  XI: Shoulder shrug is intact  XII: Tongue movements are normal  Musculoskeletal: 5/5 in all 4 extremities. Tone: Normal tone. Reflexes: Symmetric 2+ in the arms and 2+ in the legs   Planters: flexor bilaterally. Coordination: no pronator drift, no dysmetria with FNF in upper extremities. Normal REM. Sensation: normal to all modalities in both arms and legs. Gait/Posture: steady gait and normal posturing and station.      Data:  LABS:   Lab Results   Component Value Date/Time     10/01/2022 05:03 AM    K 4.4 10/01/2022 05:03 AM    K 7.0 10/01/2022 03:56 AM    CL 97 10/01/2022 05:03 AM CO2 19 10/01/2022 05:03 AM    BUN 64 10/01/2022 05:03 AM    CREATININE 7.8 10/01/2022 05:03 AM    GFRAA 9 10/01/2022 05:03 AM    GFRAA 44 06/15/2013 01:00 AM    LABGLOM 7 10/01/2022 05:03 AM    GLUCOSE 506 10/01/2022 05:03 AM    PHOS 2.4 10/01/2022 05:03 AM    MG 1.80 10/01/2022 05:03 AM    CALCIUM 7.7 10/01/2022 05:03 AM     Lab Results   Component Value Date/Time    WBC 5.7 10/01/2022 03:56 AM    RBC 4.73 10/01/2022 03:56 AM    HGB 13.9 10/01/2022 03:56 AM    HCT 43.7 10/01/2022 03:56 AM    MCV 92.5 10/01/2022 03:56 AM    RDW 14.0 10/01/2022 03:56 AM     10/01/2022 03:56 AM     Lab Results   Component Value Date    INR 0.97 12/08/2021    PROTIME 11.0 12/08/2021       Neuroimaging was independently reviewed by myself and discussed results with the patient   Reviewed notes from different physicians  Reviewed lab and blood testing    Impression:  Breakthrough seizure in a patient with known history of childhood epilepsy. None since he was 8years old. Could be symptomatic seizure from hyperglycemia, and other metabolic derangement and worsening kidney function testing. Breakthrough seizure could be also another possibility. Would recommend not to continue with Keppra at this point unless seizure recur without clear symptomatic causes. Diabetes, poorly controlled  chronic kidney disease  Hypertension  Hyperlipidemia    Recommendation:  EEG if he continues to be in the hospital till Monday  DC Keppra for now unless seizure recur. Risk outweighs the benefit. Insulin sliding scale  Diabetic control  DVT and GI prophylaxis  Telemetry  Continue current blood pressure medications  Neurochecks  A1c  Lipid panel  Statin  Aspirin  Continue dialysis  Discussed seizure precautions with the patient. Risk of falling, injury and recurrence. No driving for 3 months provided he remains seizure-free and cleared from his PCP. Follow-up with neurology outpatient if he has another seizure.         Thank you for referring such patient. If you have any questions regarding my consult note, please don't hesitate to call me. Darrick Fontenot MD  [phone]    This dictation was generated by voice recognition computer software.  Although all attempts are made to edit the dictation for accuracy, there may be errors in the  transcription that are not intended

## 2022-10-01 NOTE — PROGRESS NOTES
Facility/Department: [address] [place] [address] Arlyn Carr  Initial Assessment  DYSPHAGIA BEDSIDE SWALLOW EVALUATION     Patient: Bin Wakefield   : 1965   MRN: [id number]      Evaluation Date: 10/1/2022   Admitting Diagnosis: Seizure ([address] [R56.9]  Pain: Denies                                                       H&P: 56 y.o. male with PMHx of type 1 diabetes, hypertension, hyperlipidemia, ESRD on dialysis, HFrEF who presented to [facility] with complaints of chest pain. Patient states he was at home when he noticed some chest pain. He thought it was due to his elevated blood sugar which was in the 300s at home. He took some insulin at some food. He started his nighttime peritoneal dialysis, but his pain did not improve. It is sharp, constant pain in the middle of his chest.  It started to radiate to his left shoulder and cause some numbness and tingling in his fingers bilaterally. This sometimes happens when his sugars are out of control, but has not happened in quite some time. He does admit to some nausea and vomiting. He vomited approximately 4-5 times at home over the past 48 hours. No blood. Admits to some constipation, no diarrhea. No blood in the stool. No noticeable edema. While in the ER, patient had a witnessed seizure. He states he has had a history of seizures in the past when he was a child. His last seizure was when he was 8years old. He is not currently on any antiepileptics. Did not lose bowel or bladder function. Occasional alcohol use. No tobacco or illicit drug use. Imaging:  Chest X-ray:   Stable chest without acute cardiopulmonary process. Head CT:   1. No acute intracranial abnormality is identified. 2. Chronic paranasal sinus disease. History/Prior Level of Function:   Living Status: Pt lives with his wife and is independent for all ADL's at home.     Reason for referral: SLP evaluation orders received due to decline in medical status     Dysphagia Impressions/Diagnosis: Oropharyngeal Dysphagia   Pt sleeping in bed in ER room with his wife at bedside. Pt easily aroused though fatigued. Pt's wife verbalized that they had been awake all night completing different tests. Pt alert and oriented x4. Pt positioned into adequate upright position for oral intake. Oral mechanism exam completed with pt with following results: mild decreased lingual ROM/strength, adequate labial ROM/strength,  some missing dentition with top teeth, adequate volitional swallow with complete laryngeal elevation via palpation, adequate/strong volitional cough. Pt's swallow assessed with puree, mechanical soft peaches in juice, and regular texture cracker with thin liquids. Pt with no overt s/s of aspiration with all oral trials assessed, intermittent decreased manipulation of bolus/decreased bolus formation with regular texture likely due to lack of dentition and fatigue, intermittent mild decreased oral transit time; avg of 3 sec per bolus, complete oral clearance, independent alternation of liquids and solids. Recommend pt be upgraded to regular textures with thin liquids; pills whole with water. Recommended Diet and Intervention 10/1/2022:  Diet Solids Recommendation:  Regular texture diet  Liquid Consistency Recommendation: Thin liquids  Recommended form of Meds: Meds whole with water       Compensatory Swallowing Strategies: Alternate solids/liquids , Upright as possible with all PO intake     SHORT TERM DYSPHAGIA GOALS/PLAN OF CARE: Speech therapy for dysphagia tx 1-2 times to ensure diet tolerance.   Pt will functionally tolerate recommended diet with no overt clinical s/s of aspiration   Pt will functionally tolerate ongoing assessment of swallow function with diet to be determined as indicated   Pt will demonstrate understanding of aspiration risk and precautions via education/demonstration with occasional prompting    Dysphagia Therapeutic Intervention:  Oral Care, Diet Tolerance Monitoring , Patient/Family Education     Discharge Recommendations: Discharge recommendations to be determined pending ongoing follow-up during acute care stay    Patient Positioning: Upright in bed     Current Diet Level (prior to evaluation): NPO  NPO     Respiratory Status:   [x]Room Air   []O2 via nasal cannula   []Other:    Dentition:  []Adequate  []Dentures   [x]Missing Many Teeth  []Edentulous  []Other:    Baseline Vocal Quality:  []Normal  []Dysphonic   []Aphonic   []Hoarse  []Wet  []Weak  []Other:    Volitional Cough:  Elicited: Strong     Volitional Swallow:   []Absent   []Delayed     []Adequate     []Required use of drink     Oral Mechanism Exam:  []WFL []Mild   [] Moderate  []Severe  []To be assessed  Impaired:   []Left side      []Right side    []Labial ROM/Coordination    []Labial Symmetry   []Lingual ROM/Coordination   []Lingual Symmetry  []Gag  []Other:     Oral Phase: []WFL []Mild   [] Moderate  []Severe  []To be assessed   []Impaired/Prolonged Mastication:   []Oral Holding:   []Spillage Left:   []Spillage Right:  []Pocketing Left:   []Pocketing Right:   []Decreased Anterior to Posterior Transit:   []Suspected Premature Bolus Loss:   []Lingual/Palatal Residue:   []Other:     Pharyngeal Phase: [x]WFL [x]Mild   [] Moderate  []Severe  []To be assessed   [x]Delayed Swallow: 3 sec per bolus   []Suspected Pharyngeal Pooling:   []Decreased Laryngeal Elevation:   []Absent Swallow:  []Wet Vocal Quality:   []Throat Clearing-Immediate:   []Throat Clearing-Delayed:   []Cough-Immediate:   []Cough-Delayed:  []Change in Vital Signs:  []Suspected Delayed Pharyngeal Clearing:  []Other:     Eating Assistance:  [x]Independent  []Setup or clean-up assistance   [] Supervision or touching assistance   [] Partial or moderate assistance   [] Substantial or maximal assistance  [] Dependent     EDUCATION:   Provided education regarding role of SLP, results of assessment, recommendations and general speech pathology plan of care. [x] Pt verbalized understanding and agreement   [] Pt requires ongoing learning   [] No evidence of comprehension     If patient discharges prior to next visit, this note will serve as discharge.      Treatment time:  Timed Code Treatment Minutes: 0  Total Treatment time: 21    Electronically signed by:    Nas Gonzalez M.A., [address]  Speech-Language Pathologist

## 2022-10-02 LAB
AMPHETAMINE SCREEN, URINE: ABNORMAL
ANION GAP SERPL CALCULATED.3IONS-SCNC: 13 MMOL/L (ref 3–16)
APPEARANCE FLUID: CLEAR
BACTERIA: NORMAL /HPF
BARBITURATE SCREEN URINE: ABNORMAL
BASOPHILS ABSOLUTE: 0 K/UL (ref 0–0.2)
BASOPHILS RELATIVE PERCENT: 0.6 %
BENZODIAZEPINE SCREEN, URINE: ABNORMAL
BILIRUBIN URINE: NEGATIVE
BLOOD, URINE: NEGATIVE
BUN BLDV-MCNC: 65 MG/DL (ref 7–20)
CALCIUM SERPL-MCNC: 8.7 MG/DL (ref 8.3–10.6)
CANNABINOID SCREEN URINE: ABNORMAL
CELL COUNT FLUID TYPE: NORMAL
CHLORIDE BLD-SCNC: 94 MMOL/L (ref 99–110)
CLARITY: CLEAR
CLOT EVALUATION: NORMAL
CO2: 24 MMOL/L (ref 21–32)
COCAINE METABOLITE SCREEN URINE: ABNORMAL
COLOR FLUID: COLORLESS
COLOR: YELLOW
CREAT SERPL-MCNC: 7.6 MG/DL (ref 0.9–1.3)
EOSINOPHILS ABSOLUTE: 0.2 K/UL (ref 0–0.6)
EOSINOPHILS RELATIVE PERCENT: 4.3 %
EPITHELIAL CELLS, UA: 0 /HPF (ref 0–5)
ESTIMATED AVERAGE GLUCOSE: 194.4 MG/DL
FENTANYL SCREEN, URINE: ABNORMAL
FLUID DIFF COMMENT: NORMAL
GFR AFRICAN AMERICAN: 9
GFR NON-AFRICAN AMERICAN: 7
GLUCOSE BLD-MCNC: 103 MG/DL (ref 70–99)
GLUCOSE BLD-MCNC: 247 MG/DL (ref 70–99)
GLUCOSE BLD-MCNC: 255 MG/DL (ref 70–99)
GLUCOSE BLD-MCNC: 271 MG/DL (ref 70–99)
GLUCOSE BLD-MCNC: 275 MG/DL (ref 70–99)
GLUCOSE URINE: 250 MG/DL
HBA1C MFR BLD: 8.4 %
HCT VFR BLD CALC: 39.6 % (ref 40.5–52.5)
HEMOGLOBIN: 12.7 G/DL (ref 13.5–17.5)
HYALINE CASTS: 0 /LPF (ref 0–8)
INR BLD: 1.1 (ref 0.87–1.14)
KETONES, URINE: NEGATIVE MG/DL
LEUKOCYTE ESTERASE, URINE: ABNORMAL
LYMPHOCYTES ABSOLUTE: 1.4 K/UL (ref 1–5.1)
LYMPHOCYTES RELATIVE PERCENT: 28.5 %
Lab: ABNORMAL
MAGNESIUM: 1.9 MG/DL (ref 1.8–2.4)
MCH RBC QN AUTO: 29.2 PG (ref 26–34)
MCHC RBC AUTO-ENTMCNC: 32.1 G/DL (ref 31–36)
MCV RBC AUTO: 91.1 FL (ref 80–100)
METHADONE SCREEN, URINE: ABNORMAL
MICROSCOPIC EXAMINATION: YES
MONOCYTES ABSOLUTE: 0.7 K/UL (ref 0–1.3)
MONOCYTES RELATIVE PERCENT: 14.6 %
NEUTROPHILS ABSOLUTE: 2.5 K/UL (ref 1.7–7.7)
NEUTROPHILS RELATIVE PERCENT: 52 %
NITRITE, URINE: NEGATIVE
NUCLEATED CELLS FLUID: 3 /CUMM
OPIATE SCREEN URINE: POSITIVE
OXYCODONE URINE: ABNORMAL
PDW BLD-RTO: 13.8 % (ref 12.4–15.4)
PERFORMED ON: ABNORMAL
PH UA: 6
PH UA: 6 (ref 5–8)
PHENCYCLIDINE SCREEN URINE: ABNORMAL
PHOSPHORUS: 4.9 MG/DL (ref 2.5–4.9)
PLATELET # BLD: 129 K/UL (ref 135–450)
PMV BLD AUTO: 10.4 FL (ref 5–10.5)
POTASSIUM SERPL-SCNC: 4.3 MMOL/L (ref 3.5–5.1)
PROLACTIN: 15.2 NG/ML
PROTEIN UA: 30 MG/DL
PROTHROMBIN TIME: 14.1 SEC (ref 11.7–14.5)
RBC # BLD: 4.35 M/UL (ref 4.2–5.9)
RBC FLUID: 1 /CUMM
RBC UA: 0 /HPF (ref 0–4)
SODIUM BLD-SCNC: 131 MMOL/L (ref 136–145)
SPECIFIC GRAVITY UA: 1.01 (ref 1–1.03)
TSH REFLEX: 1.04 UIU/ML (ref 0.27–4.2)
URINE REFLEX TO CULTURE: ABNORMAL
URINE TYPE: ABNORMAL
UROBILINOGEN, URINE: 0.2 E.U./DL
VOLUME: 10 ML
WBC # BLD: 4.8 K/UL (ref 4–11)
WBC UA: 5 /HPF (ref 0–5)

## 2022-10-02 PROCEDURE — 85025 COMPLETE CBC W/AUTO DIFF WBC: CPT

## 2022-10-02 PROCEDURE — 90945 DIALYSIS ONE EVALUATION: CPT

## 2022-10-02 PROCEDURE — 83735 ASSAY OF MAGNESIUM: CPT

## 2022-10-02 PROCEDURE — 87205 SMEAR GRAM STAIN: CPT

## 2022-10-02 PROCEDURE — 6360000002 HC RX W HCPCS: Performed by: STUDENT IN AN ORGANIZED HEALTH CARE EDUCATION/TRAINING PROGRAM

## 2022-10-02 PROCEDURE — 87070 CULTURE OTHR SPECIMN AEROBIC: CPT

## 2022-10-02 PROCEDURE — 2140000000 HC CCU INTERMEDIATE R&B

## 2022-10-02 PROCEDURE — 84100 ASSAY OF PHOSPHORUS: CPT

## 2022-10-02 PROCEDURE — 85610 PROTHROMBIN TIME: CPT

## 2022-10-02 PROCEDURE — 6370000000 HC RX 637 (ALT 250 FOR IP): Performed by: STUDENT IN AN ORGANIZED HEALTH CARE EDUCATION/TRAINING PROGRAM

## 2022-10-02 PROCEDURE — 89050 BODY FLUID CELL COUNT: CPT

## 2022-10-02 PROCEDURE — 6370000000 HC RX 637 (ALT 250 FOR IP): Performed by: INTERNAL MEDICINE

## 2022-10-02 PROCEDURE — 80048 BASIC METABOLIC PNL TOTAL CA: CPT

## 2022-10-02 PROCEDURE — C9113 INJ PANTOPRAZOLE SODIUM, VIA: HCPCS | Performed by: STUDENT IN AN ORGANIZED HEALTH CARE EDUCATION/TRAINING PROGRAM

## 2022-10-02 PROCEDURE — 81001 URINALYSIS AUTO W/SCOPE: CPT

## 2022-10-02 PROCEDURE — 6360000002 HC RX W HCPCS: Performed by: EMERGENCY MEDICINE

## 2022-10-02 PROCEDURE — 80307 DRUG TEST PRSMV CHEM ANLYZR: CPT

## 2022-10-02 PROCEDURE — 87015 SPECIMEN INFECT AGNT CONCNTJ: CPT

## 2022-10-02 RX ORDER — FUROSEMIDE 40 MG/1
80 TABLET ORAL DAILY
Status: DISCONTINUED | OUTPATIENT
Start: 2022-10-03 | End: 2022-10-03 | Stop reason: HOSPADM

## 2022-10-02 RX ORDER — CALCITRIOL 0.25 UG/1
0.5 CAPSULE, LIQUID FILLED ORAL
Status: DISCONTINUED | OUTPATIENT
Start: 2022-10-03 | End: 2022-10-03 | Stop reason: HOSPADM

## 2022-10-02 RX ORDER — INSULIN GLARGINE 100 [IU]/ML
30 INJECTION, SOLUTION SUBCUTANEOUS NIGHTLY
Status: DISCONTINUED | OUTPATIENT
Start: 2022-10-02 | End: 2022-10-03 | Stop reason: HOSPADM

## 2022-10-02 RX ORDER — INSULIN LISPRO 100 [IU]/ML
8 INJECTION, SOLUTION INTRAVENOUS; SUBCUTANEOUS
Status: DISCONTINUED | OUTPATIENT
Start: 2022-10-02 | End: 2022-10-03 | Stop reason: HOSPADM

## 2022-10-02 RX ADMIN — ASPIRIN 81 MG 81 MG: 81 TABLET ORAL at 10:10

## 2022-10-02 RX ADMIN — STANDARDIZED SENNA CONCENTRATE AND DOCUSATE SODIUM 2 TABLET: 8.6; 5 TABLET ORAL at 10:10

## 2022-10-02 RX ADMIN — STANDARDIZED SENNA CONCENTRATE AND DOCUSATE SODIUM 2 TABLET: 8.6; 5 TABLET ORAL at 21:08

## 2022-10-02 RX ADMIN — SACUBITRIL AND VALSARTAN 1 TABLET: 24; 26 TABLET, FILM COATED ORAL at 10:10

## 2022-10-02 RX ADMIN — CARVEDILOL 25 MG: 25 TABLET, FILM COATED ORAL at 19:03

## 2022-10-02 RX ADMIN — HEPARIN SODIUM 5000 UNITS: 5000 INJECTION INTRAVENOUS; SUBCUTANEOUS at 05:55

## 2022-10-02 RX ADMIN — ONDANSETRON 4 MG: 2 INJECTION INTRAMUSCULAR; INTRAVENOUS at 21:16

## 2022-10-02 RX ADMIN — HEPARIN SODIUM 5000 UNITS: 5000 INJECTION INTRAVENOUS; SUBCUTANEOUS at 21:50

## 2022-10-02 RX ADMIN — HYDRALAZINE HYDROCHLORIDE 100 MG: 100 TABLET, FILM COATED ORAL at 05:55

## 2022-10-02 RX ADMIN — ISOSORBIDE MONONITRATE 30 MG: 30 TABLET, EXTENDED RELEASE ORAL at 10:10

## 2022-10-02 RX ADMIN — INSULIN LISPRO 3 UNITS: 100 INJECTION, SOLUTION INTRAVENOUS; SUBCUTANEOUS at 10:08

## 2022-10-02 RX ADMIN — GENTAMICIN SULFATE: 1 CREAM TOPICAL at 10:11

## 2022-10-02 RX ADMIN — Medication 100 MG: at 10:10

## 2022-10-02 RX ADMIN — HYDRALAZINE HYDROCHLORIDE 100 MG: 100 TABLET, FILM COATED ORAL at 17:00

## 2022-10-02 RX ADMIN — GENTAMICIN SULFATE: 1 CREAM TOPICAL at 16:21

## 2022-10-02 RX ADMIN — RANOLAZINE 500 MG: 500 TABLET, FILM COATED, EXTENDED RELEASE ORAL at 21:08

## 2022-10-02 RX ADMIN — INSULIN GLARGINE 30 UNITS: 100 INJECTION, SOLUTION SUBCUTANEOUS at 21:13

## 2022-10-02 RX ADMIN — INSULIN LISPRO 5 UNITS: 100 INJECTION, SOLUTION INTRAVENOUS; SUBCUTANEOUS at 19:01

## 2022-10-02 RX ADMIN — CLOPIDOGREL BISULFATE 75 MG: 75 TABLET ORAL at 10:10

## 2022-10-02 RX ADMIN — ROSUVASTATIN 10 MG: 10 TABLET, FILM COATED ORAL at 21:08

## 2022-10-02 RX ADMIN — CYANOCOBALAMIN TAB 1000 MCG 1000 MCG: 1000 TAB at 10:15

## 2022-10-02 RX ADMIN — NALOXEGOL OXALATE 25 MG: 25 TABLET, FILM COATED ORAL at 17:09

## 2022-10-02 RX ADMIN — PANTOPRAZOLE SODIUM 40 MG: 40 INJECTION, POWDER, FOR SOLUTION INTRAVENOUS at 10:08

## 2022-10-02 RX ADMIN — RANOLAZINE 500 MG: 500 TABLET, FILM COATED, EXTENDED RELEASE ORAL at 10:10

## 2022-10-02 RX ADMIN — HEPARIN SODIUM 5000 UNITS: 5000 INJECTION INTRAVENOUS; SUBCUTANEOUS at 13:00

## 2022-10-02 RX ADMIN — CARVEDILOL 25 MG: 25 TABLET, FILM COATED ORAL at 10:09

## 2022-10-02 RX ADMIN — HYDRALAZINE HYDROCHLORIDE 100 MG: 100 TABLET, FILM COATED ORAL at 21:07

## 2022-10-02 RX ADMIN — INSULIN LISPRO 6 UNITS: 100 INJECTION, SOLUTION INTRAVENOUS; SUBCUTANEOUS at 12:12

## 2022-10-02 RX ADMIN — SACUBITRIL AND VALSARTAN 1 TABLET: 24; 26 TABLET, FILM COATED ORAL at 21:08

## 2022-10-02 ASSESSMENT — PAIN SCALES - GENERAL
PAINLEVEL_OUTOF10: 0
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
## 2022-10-02 NOTE — PROGRESS NOTES
CCPD Order   Exchanges: 3   Exchange Volume: 2.5 ml   Total Time: 8hrs    Last Fill: n/a ml   Total Volume: 75 ml     Orders verified. Supplies taken to pt's room. Report received. Cycler set up, primed and pre tested. [facility] Pt connected to cycler. CCPD initiated without problem. Initial effluent clear. If problems should arise please call the [phone] on top of PD cycler machine.        If problems persist please call [phone]
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
## 2022-10-02 NOTE — PROGRESS NOTES
Nephrology progress Note  [phone]   [facility]. [employer]       Patient:  Kian Guzman   : 1965    Brief HPI              The patient is a 56 y. o.male with significant past medical history of ESRD on PD, KELLY s/p SUZANNA stent, CAD/CABG/CHF DM II, HPL, PVD, seizures came in with chest pain. Stated that he was on his cycler for PD while he developed chest pain that radiated to his left shoulder.   Also reports of nausea, constipation and poor appetite, po intake for the past few days  In the ER, had a witnessed seizure  CXR with no acute changes  CT head with no acute process  Labs in the ER with hyperkalemia on hemolysed sample,  hyperglycemia with a blood glucose of 513, acidosis with a venous pH of 7.4,   Received medications however with repeat K of 4.4  We are consulted for ESRD management  He has been on Nocturnal Peritoneal dialysis and follows with Dr Kwadwo Jones  required Insulin gtt for possible DKA  Says his insulin pump may be malfunctioning    Subjective/Interval history    Pt seen and examined  Visitors at the bedside  BPs controlled  Denies chest pains or sob      Review of Systems  No abdominal pain or nausea/emesis      Meds:  Scheduled Meds:   insulin glargine  30 Units SubCUTAneous Nightly    insulin lispro  8 Units SubCUTAneous TID WC    naloxegol  25 mg Oral QAM AC    sodium bicarbonate  25 mEq IntraVENous Once    calcium gluconate-NaCl  1,000 mg IntraVENous Once    thiamine mononitrate  100 mg Oral Daily    calcitRIOL  0.5 mcg Oral Daily    carvedilol  25 mg Oral BID WC    clopidogrel  75 mg Oral Daily    [Held by provider] furosemide  40 mg IntraVENous BID    hydrALAZINE  100 mg Oral 3 times per day    isosorbide mononitrate  30 mg Oral Daily    vitamin B-12  1,000 mcg Oral Daily    ranolazine  500 mg Oral BID    rosuvastatin  10 mg Oral Nightly    sacubitril-valsartan  1 tablet Oral BID    heparin (porcine)  5,000 Units SubCUTAneous 3 times per day    pantoprazole  40 mg IntraVENous Daily    aspirin  81 mg Oral Daily    insulin lispro  0-8 Units SubCUTAneous TID WC    insulin lispro  0-4 Units SubCUTAneous Nightly    gentamicin   Topical Daily    sennosides-docusate sodium  2 tablet Oral BID     Continuous Infusions:   dextrose       PRN Meds:.ondansetron, dextrose bolus **OR** dextrose bolus, magnesium sulfate, polyethylene glycol, acetaminophen **OR** acetaminophen, glucagon (rDNA), dextrose      Vitals:  /71   Pulse 84   Temp 98.8 °F (37.1 °C)   Resp 13   Ht 5' 7\" (1.702 m)   Wt 199 lb 8.3 oz (90.5 kg)   SpO2 99%   BMI 31.25 kg/m²       Physical Exam  General : AAOx3, not in pain or respiratory distress, resting in bed  HEENT : normocephalic, atraumatic, mucosa moist, no palor or icterus  CVS: S1 S2 normal, regular rhythm, no murmurs or rubs. Lungs: Clear, no wheezing or crackles. Abd: Soft, bowel sounds normal, non-tender. Ext: No edema, no cyanosis  Skin: Warm. No rashes appreciated.   : bladder non-distended, no tenderness over the bladder     Access : PD catheter+ site clean with no erythema or drainage      Labs:  CBC with Differential:    Lab Results   Component Value Date/Time    WBC 4.8 10/02/2022 04:25 AM    RBC 4.35 10/02/2022 04:25 AM    HGB 12.7 10/02/2022 04:25 AM    HCT 39.6 10/02/2022 04:25 AM     10/02/2022 04:25 AM    MCV 91.1 10/02/2022 04:25 AM    MCH 29.2 10/02/2022 04:25 AM    MCHC 32.1 10/02/2022 04:25 AM    RDW 13.8 10/02/2022 04:25 AM    SEGSPCT 85.2 06/15/2013 01:00 AM    LYMPHOPCT 28.5 10/02/2022 04:25 AM    MONOPCT 14.6 10/02/2022 04:25 AM    EOSPCT 2.1 09/28/2010 10:08 AM    BASOPCT 0.6 10/02/2022 04:25 AM    MONOSABS 0.7 10/02/2022 04:25 AM    LYMPHSABS 1.4 10/02/2022 04:25 AM    EOSABS 0.2 10/02/2022 04:25 AM    BASOSABS 0.0 10/02/2022 04:25 AM    DIFFTYPE Scan-K 06/15/2013 01:00 AM     BMP:    Lab Results   Component Value Date/Time     10/02/2022 04:25 AM    K 4.3 10/02/2022 04:25 AM    K 7.0 10/01/2022 03:56 AM    CL 94 10/02/2022 04:25 AM    CO2 24 10/02/2022 04:25 AM    BUN 65 10/02/2022 04:25 AM    LABALBU 3.4 10/01/2022 05:03 AM    CREATININE 7.6 10/02/2022 04:25 AM    CALCIUM 8.7 10/02/2022 04:25 AM    GFRAA 9 10/02/2022 04:25 AM    GFRAA 44 06/15/2013 01:00 AM    LABGLOM 7 10/02/2022 04:25 AM    GLUCOSE 275 10/02/2022 04:25 AM     Ionized Calcium:  No results found for: IONCA  Magnesium:    Lab Results   Component Value Date/Time    MG 1.90 10/02/2022 04:25 AM     Phosphorus:    Lab Results   Component Value Date/Time    PHOS 4.9 10/02/2022 04:25 AM       Assessment/Plan:    ESRD on NIPD  Continue cycler for 8 hours, use 1.5 % D, 3 exchanges   DKA/Type 1 DM   Off Insulin gtt  Seizure  H/o childhood epilepsy  Per neurology, EEG pending  Secondary hyperparathyroidism  Continue calcitriol  Hypertension controlled  Continue same meds  CHF systolic and diastolic  Appears euvolemic  Change lasix to Vernell Limon MD  10/2/2022  Office Phone : [phone]    Thank you for allowing us to participate in the care of this pt. I willcontinue to follow along. Please call with questions or concerns.

## 2022-10-02 NOTE — FLOWSHEET NOTE
CCPD Order   Exchanges: 3   Exchange Volume: 2500 ml   Total Time: 8 hrs   Dextrose: 1.5%   Last Fill: 0 ml   Total Volume: 7500 ml     Orders verified. Supplies taken to pt's room. Report received. Cycler set up, primed and pre tested. Dressing changed on [facility] Cath site. Pt connected to cycler. CCPD initiated without problem. Initial effluent clear. If problems should arise please call the [phone] on top of PD cycler machine.      10/02/22 1603   Vital Signs   /70   Temp 98.3 °F (36.8 °C)   Heart Rate 84   Resp 14   SpO2 99 %   Weight 201 lb 11.5 oz (91.5 kg)   Weight Method Bed scale   Percent Weight Change 1.1

## 2022-10-02 NOTE — FLOWSHEET NOTE
CCPD order:   1.5%              Continuous Cycling Peritoneal Dialysis (CCPD): Patient Communication     Not Released  Not seen     Order Questions    Question Answer   Total Volume (L) 7500   Therapy Time Duration (Hours) 8   Exchange Volume (L) 2.5   Number of Exchanges 3   Final Fill No       UF: -442 ML  Total Time: 8 hours and 51 minutes  Alarm: 16 times over night  CCPD removed fluid: color clear, light yellow ; without fibrin noticed  Pt tolerated well over night with CCPD  Plan to put pt on CCPD this evening if pt's still at hospital  Placed CCPD flow sheets in the chart for EMR scan    **Lab collected: Peritoneal effluent cell count and culture     10/02/22 0828   Vital Signs   /72   Temp 98.8 °F (37.1 °C)   Heart Rate 86   Resp 13   SpO2 99 %   Weight 199 lb 8.3 oz (90.5 kg)   Weight Method Bed scale   Percent Weight Change 0.56

## 2022-10-03 VITALS
HEART RATE: 78 BPM | TEMPERATURE: 97.7 F | WEIGHT: 196.87 LBS | HEIGHT: 67 IN | OXYGEN SATURATION: 99 % | DIASTOLIC BLOOD PRESSURE: 86 MMHG | SYSTOLIC BLOOD PRESSURE: 172 MMHG | BODY MASS INDEX: 30.9 KG/M2 | RESPIRATION RATE: 14 BRPM

## 2022-10-03 PROBLEM — E87.5 HYPERKALEMIA: Status: RESOLVED | Noted: 2022-10-01 | Resolved: 2022-10-03

## 2022-10-03 PROBLEM — E10.10 DKA, TYPE 1, NOT AT GOAL (HCC): Status: RESOLVED | Noted: 2019-10-23 | Resolved: 2022-10-03

## 2022-10-03 PROBLEM — R56.9 SEIZURE (HCC): Status: RESOLVED | Noted: 2022-10-01 | Resolved: 2022-10-03

## 2022-10-03 LAB
ANION GAP SERPL CALCULATED.3IONS-SCNC: 15 MMOL/L (ref 3–16)
BASOPHILS ABSOLUTE: 0 K/UL (ref 0–0.2)
BASOPHILS RELATIVE PERCENT: 0.7 %
BUN BLDV-MCNC: 64 MG/DL (ref 7–20)
CALCIUM SERPL-MCNC: 9.2 MG/DL (ref 8.3–10.6)
CHLORIDE BLD-SCNC: 98 MMOL/L (ref 99–110)
CO2: 25 MMOL/L (ref 21–32)
CREAT SERPL-MCNC: 8 MG/DL (ref 0.9–1.3)
EOSINOPHILS ABSOLUTE: 0.2 K/UL (ref 0–0.6)
EOSINOPHILS RELATIVE PERCENT: 4.5 %
GFR AFRICAN AMERICAN: 9
GFR NON-AFRICAN AMERICAN: 7
GLUCOSE BLD-MCNC: 467 MG/DL (ref 70–99)
GLUCOSE BLD-MCNC: 85 MG/DL (ref 70–99)
GLUCOSE BLD-MCNC: 88 MG/DL (ref 70–99)
GLUCOSE BLD-MCNC: 91 MG/DL (ref 70–99)
HCT VFR BLD CALC: 44.6 % (ref 40.5–52.5)
HEMOGLOBIN: 14.6 G/DL (ref 13.5–17.5)
INR BLD: 1 (ref 0.87–1.14)
LYMPHOCYTES ABSOLUTE: 1.2 K/UL (ref 1–5.1)
LYMPHOCYTES RELATIVE PERCENT: 29.3 %
MAGNESIUM: 2.2 MG/DL (ref 1.8–2.4)
MCH RBC QN AUTO: 29.9 PG (ref 26–34)
MCHC RBC AUTO-ENTMCNC: 32.8 G/DL (ref 31–36)
MCV RBC AUTO: 91.2 FL (ref 80–100)
MONOCYTES ABSOLUTE: 0.6 K/UL (ref 0–1.3)
MONOCYTES RELATIVE PERCENT: 13.2 %
NEUTROPHILS ABSOLUTE: 2.2 K/UL (ref 1.7–7.7)
NEUTROPHILS RELATIVE PERCENT: 52.3 %
PDW BLD-RTO: 13.7 % (ref 12.4–15.4)
PERFORMED ON: ABNORMAL
PERFORMED ON: NORMAL
PERFORMED ON: NORMAL
PHOSPHORUS: 5.5 MG/DL (ref 2.5–4.9)
PLATELET # BLD: 149 K/UL (ref 135–450)
PMV BLD AUTO: 10.2 FL (ref 5–10.5)
POTASSIUM SERPL-SCNC: 4 MMOL/L (ref 3.5–5.1)
PROTHROMBIN TIME: 13.1 SEC (ref 11.7–14.5)
RBC # BLD: 4.88 M/UL (ref 4.2–5.9)
SODIUM BLD-SCNC: 138 MMOL/L (ref 136–145)
WBC # BLD: 4.2 K/UL (ref 4–11)

## 2022-10-03 PROCEDURE — 95819 EEG AWAKE AND ASLEEP: CPT

## 2022-10-03 PROCEDURE — 6370000000 HC RX 637 (ALT 250 FOR IP): Performed by: INTERNAL MEDICINE

## 2022-10-03 PROCEDURE — 99232 SBSQ HOSP IP/OBS MODERATE 35: CPT | Performed by: PSYCHIATRY & NEUROLOGY

## 2022-10-03 PROCEDURE — 84100 ASSAY OF PHOSPHORUS: CPT

## 2022-10-03 PROCEDURE — 6370000000 HC RX 637 (ALT 250 FOR IP): Performed by: STUDENT IN AN ORGANIZED HEALTH CARE EDUCATION/TRAINING PROGRAM

## 2022-10-03 PROCEDURE — 80048 BASIC METABOLIC PNL TOTAL CA: CPT

## 2022-10-03 PROCEDURE — 6360000002 HC RX W HCPCS: Performed by: STUDENT IN AN ORGANIZED HEALTH CARE EDUCATION/TRAINING PROGRAM

## 2022-10-03 PROCEDURE — 97530 THERAPEUTIC ACTIVITIES: CPT

## 2022-10-03 PROCEDURE — 97535 SELF CARE MNGMENT TRAINING: CPT

## 2022-10-03 PROCEDURE — 97112 NEUROMUSCULAR REEDUCATION: CPT

## 2022-10-03 PROCEDURE — 92526 ORAL FUNCTION THERAPY: CPT

## 2022-10-03 PROCEDURE — 85025 COMPLETE CBC W/AUTO DIFF WBC: CPT

## 2022-10-03 PROCEDURE — 85610 PROTHROMBIN TIME: CPT

## 2022-10-03 PROCEDURE — 97110 THERAPEUTIC EXERCISES: CPT

## 2022-10-03 PROCEDURE — 83735 ASSAY OF MAGNESIUM: CPT

## 2022-10-03 PROCEDURE — 97116 GAIT TRAINING THERAPY: CPT

## 2022-10-03 PROCEDURE — 97165 OT EVAL LOW COMPLEX 30 MIN: CPT

## 2022-10-03 PROCEDURE — 4A10X4Z MONITORING OF CENTRAL NERVOUS ELECTRICAL ACTIVITY, EXTERNAL APPROACH: ICD-10-PCS | Performed by: PSYCHIATRY & NEUROLOGY

## 2022-10-03 PROCEDURE — 94760 N-INVAS EAR/PLS OXIMETRY 1: CPT

## 2022-10-03 PROCEDURE — C9113 INJ PANTOPRAZOLE SODIUM, VIA: HCPCS | Performed by: STUDENT IN AN ORGANIZED HEALTH CARE EDUCATION/TRAINING PROGRAM

## 2022-10-03 PROCEDURE — 97161 PT EVAL LOW COMPLEX 20 MIN: CPT

## 2022-10-03 RX ORDER — METOCLOPRAMIDE 5 MG/1
5 TABLET ORAL
Qty: 120 TABLET | Refills: 1 | Status: SHIPPED | OUTPATIENT
Start: 2022-10-03 | End: 2022-11-02

## 2022-10-03 RX ORDER — SENNA AND DOCUSATE SODIUM 50; 8.6 MG/1; MG/1
2 TABLET, FILM COATED ORAL 2 TIMES DAILY
Qty: 120 TABLET | Refills: 0 | Status: SHIPPED | OUTPATIENT
Start: 2022-10-03 | End: 2022-11-02

## 2022-10-03 RX ORDER — ONDANSETRON 4 MG/1
4 TABLET, FILM COATED ORAL 3 TIMES DAILY PRN
Qty: 30 TABLET | Refills: 0 | Status: SHIPPED | OUTPATIENT
Start: 2022-10-03

## 2022-10-03 RX ADMIN — CLOPIDOGREL BISULFATE 75 MG: 75 TABLET ORAL at 09:55

## 2022-10-03 RX ADMIN — SACUBITRIL AND VALSARTAN 1 TABLET: 24; 26 TABLET, FILM COATED ORAL at 09:57

## 2022-10-03 RX ADMIN — PANTOPRAZOLE SODIUM 40 MG: 40 INJECTION, POWDER, FOR SOLUTION INTRAVENOUS at 09:56

## 2022-10-03 RX ADMIN — HYDRALAZINE HYDROCHLORIDE 100 MG: 100 TABLET, FILM COATED ORAL at 06:41

## 2022-10-03 RX ADMIN — HYDRALAZINE HYDROCHLORIDE 100 MG: 100 TABLET, FILM COATED ORAL at 13:31

## 2022-10-03 RX ADMIN — Medication 100 MG: at 09:56

## 2022-10-03 RX ADMIN — CALCITRIOL 0.5 MCG: 0.25 CAPSULE ORAL at 09:55

## 2022-10-03 RX ADMIN — ASPIRIN 81 MG 81 MG: 81 TABLET ORAL at 09:55

## 2022-10-03 RX ADMIN — CARVEDILOL 25 MG: 25 TABLET, FILM COATED ORAL at 09:55

## 2022-10-03 RX ADMIN — CYANOCOBALAMIN TAB 1000 MCG 1000 MCG: 1000 TAB at 09:55

## 2022-10-03 RX ADMIN — HEPARIN SODIUM 5000 UNITS: 5000 INJECTION INTRAVENOUS; SUBCUTANEOUS at 13:31

## 2022-10-03 RX ADMIN — ISOSORBIDE MONONITRATE 30 MG: 30 TABLET, EXTENDED RELEASE ORAL at 09:55

## 2022-10-03 RX ADMIN — NALOXEGOL OXALATE 25 MG: 25 TABLET, FILM COATED ORAL at 06:41

## 2022-10-03 RX ADMIN — INSULIN LISPRO 8 UNITS: 100 INJECTION, SOLUTION INTRAVENOUS; SUBCUTANEOUS at 10:33

## 2022-10-03 RX ADMIN — FUROSEMIDE 80 MG: 40 TABLET ORAL at 09:30

## 2022-10-03 RX ADMIN — STANDARDIZED SENNA CONCENTRATE AND DOCUSATE SODIUM 2 TABLET: 8.6; 5 TABLET ORAL at 09:54

## 2022-10-03 RX ADMIN — HEPARIN SODIUM 5000 UNITS: 5000 INJECTION INTRAVENOUS; SUBCUTANEOUS at 06:41

## 2022-10-03 RX ADMIN — RANOLAZINE 500 MG: 500 TABLET, FILM COATED, EXTENDED RELEASE ORAL at 09:57

## 2022-10-03 ASSESSMENT — PAIN SCALES - GENERAL
PAINLEVEL_OUTOF10: 0

## 2022-10-03 NOTE — CARE COORDINATION
Therapy recommends outpt services. No home care needed. RN updated. Pt updated.        Patient discharged 10/03/2022 to home  All discharge needs met per case management     Jory Rosen RN, BSN  [phone]

## 2022-10-03 NOTE — PROGRESS NOTES
[facility]   Nephrology progress Note  [phone]   [facility]. [employer]           SUMMARY :  We are following this patient for ESRD on peritoneal dialysis  79-year-old male with end-stage renal disease on maintenance peritoneal dialysis, coronary artery disease s/p CABG, T2DM, hyperlipidemia, CHF, peripheral vascular disease, seizure disorder presented to the ER with chest pain the pain was retrosternal radiated to his left shoulder was accompanied by nausea. He had a witnessed seizure in the ER. Chest x-ray and CT head were normal.  Initial labs showed a blood sugar of 513 acidosis as well as a high potassium recheck potassium was 4.4  He follows with my partner Dr. Shikha Marin and is on nocturnal PD. SUBJECTIVE:     Patient progress reviewed. The patient's wife is by his side. Vicente Su He complains of constipation  There is no chest pain    Physical Exam:    VITALS:  BP (!) 172/86   Pulse 78   Temp 97.7 °F (36.5 °C) (Temporal)   Resp 14   Ht 5' 7\" (1.702 m)   Wt 196 lb 13.9 oz (89.3 kg)   SpO2 100%   BMI 30.83 kg/m²   BLOOD PRESSURE RANGE:  Systolic (25PKY), ADDISON:737 , Min:109 , RJU:691   ; Diastolic (98XMI), RMQ:29, Min:70, Max:86    24HR INTAKE/OUTPUT:    Intake/Output Summary (Last 24 hours) at 10/3/2022 1203  Last data filed at 10/3/2022 0655  Gross per 24 hour   Intake 120 ml   Output 1100 ml   Net -980 ml       Gen:  alert, oriented x 3  PERRL , EOM +  Pallor +, No icterus  JVP not raised   CV: RRR no murmur or rub . Midsternal scar of CABG  Lungs:B/ L air entry, Normal breath sounds   Abd: soft, bowel sounds + , No organomegaly . PD catheter  Ext: No edema, no cyanosis  Skin: Warm.   No rash  Neuro: nonfocal.      DATA:    CBC with Differential:    Lab Results   Component Value Date/Time    WBC 4.2 10/03/2022 04:45 AM    RBC 4.88 10/03/2022 04:45 AM    HGB 14.6 10/03/2022 04:45 AM    HCT 44.6 10/03/2022 04:45 AM     10/03/2022 04:45 AM    MCV 91.2 10/03/2022 04:45 AM MCH 29.9 10/03/2022 04:45 AM    MCHC 32.8 10/03/2022 04:45 AM    RDW 13.7 10/03/2022 04:45 AM    SEGSPCT 85.2 06/15/2013 01:00 AM    LYMPHOPCT 29.3 10/03/2022 04:45 AM    MONOPCT 13.2 10/03/2022 04:45 AM    EOSPCT 2.1 09/28/2010 10:08 AM    BASOPCT 0.7 10/03/2022 04:45 AM    MONOSABS 0.6 10/03/2022 04:45 AM    LYMPHSABS 1.2 10/03/2022 04:45 AM    EOSABS 0.2 10/03/2022 04:45 AM    BASOSABS 0.0 10/03/2022 04:45 AM    DIFFTYPE Scan-K 06/15/2013 01:00 AM     CMP:    Lab Results   Component Value Date/Time     10/03/2022 04:45 AM    K 4.0 10/03/2022 04:45 AM    K 7.0 10/01/2022 03:56 AM    CL 98 10/03/2022 04:45 AM    CO2 25 10/03/2022 04:45 AM    BUN 64 10/03/2022 04:45 AM    CREATININE 8.0 10/03/2022 04:45 AM    GFRAA 9 10/03/2022 04:45 AM    GFRAA 44 06/15/2013 01:00 AM    AGRATIO 1.6 10/01/2022 03:56 AM    LABGLOM 7 10/03/2022 04:45 AM    GLUCOSE 88 10/03/2022 04:45 AM    PROT 7.4 10/01/2022 03:56 AM    PROT 7.9 09/28/2010 10:08 AM    LABALBU 3.4 10/01/2022 05:03 AM    CALCIUM 9.2 10/03/2022 04:45 AM    BILITOT 0.5 10/01/2022 03:56 AM    ALKPHOS 123 10/01/2022 03:56 AM    AST 18 10/01/2022 03:56 AM    ALT 17 10/01/2022 03:56 AM     Magnesium:    Lab Results   Component Value Date/Time    MG 2.20 10/03/2022 04:45 AM     Phosphorus:    Lab Results   Component Value Date/Time    PHOS 5.5 10/03/2022 04:45 AM     Troponin:    Lab Results   Component Value Date/Time    TROPONINI 0.10 10/01/2022 02:20 PM     U/A:    Lab Results   Component Value Date/Time    COLORU Yellow 10/02/2022 04:10 AM    PROTEINU 30 10/02/2022 04:10 AM    PHUR 6.0 10/02/2022 04:10 AM    PHUR 6.0 10/02/2022 04:10 AM    WBCUA 5 10/02/2022 04:10 AM    RBCUA 0 10/02/2022 04:10 AM    MUCUS 1+ 05/25/2010 07:19 PM    BACTERIA None Seen 10/02/2022 04:10 AM    CLARITYU Clear 10/02/2022 04:10 AM    SPECGRAV 1.010 10/02/2022 04:10 AM    LEUKOCYTESUR TRACE 10/02/2022 04:10 AM    UROBILINOGEN 0.2 10/02/2022 04:10 AM    BILIRUBINUR Negative 10/02/2022 04:10 AM    BILIRUBINUR NEGATIVE 05/25/2010 07:19 PM    BLOODU Negative 10/02/2022 04:10 AM    GLUCOSEU 250 10/02/2022 04:10 AM    GLUCOSEU >=1000 05/25/2010 07:19 PM    AMORPHOUS 2+ 12/07/2021 05:08 AM         IMPRESSION/RECOMMENDATIONS:      Diagnosis and Plan     ESRD on peritoneal dialysis: Continuing home therapy  Hyperkalemia: Possibly secondary to hemolysis. ,  Currently K normal at 4  Coronary artery disease: S/p CABG, PCI and stent. No chest pain at this time  Type 1 diabetes with DKA  Seizures history of childhood epilepsy  Hypertension  CHF on p.o.  Shay Fuentes MD

## 2022-10-03 NOTE — CARE COORDINATION
Discharge Planning Assessment  Discharge Planning Assessment  RN/SW discharge planner met with patient/ (and family member) to discuss reason for admission, current living situation, and potential needs at the time of discharge    Demographics/Insurance verified Yes/    Current type of dwelling:Split level home ? How many steps     Patient from ECF/SW confirmed with:n/a     Living arrangements: lives with spouse     Level of function/Support: Independent and spouse is supportive     PCP: Jsoie Medina     Last Visit to PCP: \"few months ago\"     DME: none     Active with any community resources/agencies/skilled home care: does peritoneal dialysis at home and states equipment and supplies provided by [employer] [facility]     Medication compliance issues:independent     Financial issues that could impact healthcare:        Tentative discharge plan: home with hc services   Discussed and provided facilities of choice if transition to a skilled nursing facility is required at the time of discharge      Discussed with patient and/or family that on the day of discharge home tentative time of discharge will be between 10 AM and noon.     Transportation at the time of discharge: spouse     Gale Cisneros RN, BSN  [phone]

## 2022-10-03 NOTE — PROGRESS NOTES
[facility]  Speech Language Pathology   Dysphagia Treatment Note    Patient: Meli Monroe   : 1965   MRN: [id number]      Evaluation Date: 10/3/2022      Admitting Dx: Hyperkalemia [E87.5]  Seizure ([address] [R56.9]  Hyperglycemia [R73.9]  Chest pain, unspecified type [R07.9]  Treatment Diagnosis: Oropharyngeal Dysphagia   Pain: Denies                                              Diet and Treatment Recommendations 10/3/2022:  Diet Solids Recommendation:  Regular texture diet  Liquid Consistency Recommendation: Thin liquids  Recommended form of Meds: Meds whole with water     Compensatory strategies: Alternate solids/liquids , Upright as possible with all PO intake     Assessment of Texture Tolerance:  Diet level prior to treatment: Regular texture diet , Thin liquids   Tolerance of Current Diet Level:Per chart, no noted difficulty with current diet level     Impressions: Pt was positioned Upright in chair, awake and alert. Currently on room air. Trials of thin liquids, mixed consistency , and regular solids  were provided to assess swallow function. Oral phase characterized by adequate oral acceptance and labial seal, mildly prolonged but functional oral manipulation and A-P transit. Pt independently took sips of liquid following regular solid presentation, no significant oral residue post swallow. Swallow initiation appeared timely, no overt clinical s/s aspiration. Based on today's assessment recommend Regular texture diet  with Thin liquids , Meds whole with water. Dysphagia Goals:   Pt will functionally tolerate recommended diet with no overt clinical s/s of aspiration. (ongoing 10/3/2022)   Pt will functionally tolerate ongoing assessment of swallow function with diet to be determined as indicated.  (ongoing 10/3/2022)  Pt will demonstrate understanding of aspiration risk and precautions via education/demonstration with occasional prompting. (ongoing 10/3/2022)    Plan:   1-2

## 2022-10-03 NOTE — DISCHARGE INSTR - COC
Continuity of Care Form    Patient Name: Cailin Velasquez   :  1965  MRN:  [id number]    Admit date:  10/1/2022  Discharge date: 10.3.22    Code Status Order: Full Code   Advance Directives:     Admitting Physician:  Michael Medley DO  PCP: Jojo Soto MD    Discharging Nurse: [facility] Unit/Room#: CVU-2912/2912-01  Discharging Unit Phone Number:     Emergency Contact:   Extended Emergency Contact Information  Primary Emergency Contact: [address] [place] [address] Phone: [phone]  Relation: Child  Secondary Emergency Contact: [place]  Mobile Phone: [phone]  Relation: Brother/Sister    Past Surgical History:  Past Surgical History:   Procedure Laterality Date    CARDIAC CATHETERIZATION      CARDIAC SURGERY      triple bypass    CAROTID STENT PLACEMENT Right     CORONARY ARTERY BYPASS GRAFT      HERNIA REPAIR      LAPAROSCOPY INSERTION PERITONEAL CATHETER N/A 6/3/2020    LAPAROSCOPIC PLACEMENT OF PERITONEAL DIALYSIS  CATHETER performed by Sukhdev Pang MD at [address] [place] [address]         Immunization History:   Immunization History   Administered Date(s) Administered    DT (pediatric) 1999    Influenza Vaccine, unspecified formulation 2012    Influenza Virus Vaccine 2009, 2012    Influenza, FLUARIX, FLULAVAL, [address] (age 10 mo+) AND AFLURIA, (age 1 y+), PF, 0.5mL 10/26/2016, 2018, 2021    Pneumococcal Polysaccharide ([id number]) 2011, 2014    Td, unspecified formulation 2011    Tdap (Boostrix, Adacel) 2014       Active Problems:  Patient Active Problem List   Diagnosis Code    DKA (diabetic ketoacidoses) E11.10    Coronary artery disease due to lipid rich plaque I25.10, I25.83    DM type 1 (diabetes mellitus, type 1) (HCC) E10.9    Hyperlipidemia E78.5    Acute kidney injury superimposed on chronic kidney disease (HCC) N17.9, N18.9    PNA (pneumonia) J18.9    CRI (chronic renal insufficiency) ([facility]) N18.9    CHF (congestive heart failure) ([facility]) I50.9    Hypoglycemia K62.3    Diastolic HF (heart failure) ([facility]) I50.30    Dyslipidemia E78.5    CKD (chronic kidney disease) stage 2, GFR 60-89 ml/min N18.2    Hypertension due to end stage renal disease caused by type 1 diabetes mellitus, on dialysis ([facility]) E10.22, I12.0, Z99.2, N18.6    Diabetic peripheral neuropathy ([facility]) E11.42    Carotid artery stenosis I65.29    PVD (peripheral vascular disease) ([facility]) I73.9    Diabetic nephropathy associated with type 1 diabetes mellitus ([facility]) E10.21    Chest pain R07.9    Stenosis of right carotid artery I65.21    Carotid artery calcification I65.29    H/O carotid angioplasty Z98.62    Pure hypercholesterolemia E78.00    Abnormal cardiovascular stress test R94.39    Acute encephalopathy G93.40    Generalized idiopathic epilepsy, not intractable, without status epilepticus ([facility] G40.309    Encephalopathy G93.40    NSTEMI (non-ST elevated myocardial infarction) ([facility] I21.4    Essential hypertension I10    S/P CABG (coronary artery bypass graft) Z95.1    Ischemic cardiomyopathy I25.5    Venous insufficiency of both lower extremities I87.2    Atrial fibrillation with rapid ventricular response ([facility]) I48.91    Syncope R55    Stage 4 chronic kidney disease ([facility]) N18.4    NSVT (nonsustained ventricular tachycardia) I47.29    Acute on chronic combined systolic and diastolic CHF (congestive heart failure) ([facility]) I50.43    Left leg weakness R29.898    Arterial ischemic stroke, ICA, right, acute ([facility] I63.231    DM (diabetes mellitus), type 2, uncontrolled with complications [id number]    End-stage renal disease on peritoneal dialysis ([facility]) N18.6, Z99.2    Hypersomnia G47.10    Moderate obstructive sleep apnea G47.33    Snoring R06.83    Unstable angina ([facility]) I20.0    ESRD on peritoneal dialysis ([facility]) N18.6, Z99.2    Fever due to COVID-19 U07.1, R50.81    Peritonitis associated with peritoneal dialysis ([facility] T85.71XA Sepsis ([facility] A41.9    Dialysis-associated peritonitis ([facility] T85.71XA    Nausea and vomiting R11.2    Elevated procalcitonin R79.89    Peritoneal dialysis catheter in place [facility]) Z99.2    Abdominal pain R10.9    Poorly controlled type 1 diabetes mellitus ([facility] E10.65    Overweight (BMI 25.0-29. 9) E66.3    Carotid stenosis, right I65.21    Hyperlipidemia due to type 1 diabetes mellitus (HCC) E10.69, E78.5    HFrEF (heart failure with reduced ejection fraction) ([facility]) I50.20       Isolation/Infection:   Isolation            No Isolation          Patient Infection Status       Infection Onset Added Last Indicated Last Indicated By Review Planned Expiration Resolved Resolved By    None active    Resolved    COVID-19 21 COVID-19   21     COVID-19 (Rule Out) 21 COVID-19 (Ordered)   21 Rule-Out Test Resulted    COVID-19 (Rule Out) 20 COVID-19 Ambulatory (Ordered)   20 Rule-Out Test Resulted            Nurse Assessment:  Last Vital Signs: BP (!) 172/86   Pulse 78   Temp 97.7 °F (36.5 °C) (Temporal)   Resp 14   Ht 5' 7\" (1.702 m)   Wt 196 lb 13.9 oz (89.3 kg)   SpO2 99%   BMI 30.83 kg/m²     Last documented pain score (0-10 scale): Pain Level: 0  Last Weight:   Wt Readings from Last 1 Encounters:   10/03/22 196 lb 13.9 oz (89.3 kg)     Mental Status:  oriented, alert, coherent, and able to concentrate and follow conversation    IV Access:  - None    Nursing Mobility/ADLs:  Walking   Independent  Transfer  Independent  [facility]  Independent  Med Delivery   none    Wound Care Documentation and Therapy:  Puncture 22 Groin (Active)   Number of days: 145       Incision 20 Abdomen Medial;Upper (Active)   Number of days: 851        Elimination:  Continence:    Bowel: Yes  Bladder: No  Urinary Catheter: None Colostomy/Ileostomy/Ileal Conduit: No       Date of Last BM 10.3.22    Intake/Output Summary (Last 24 hours) at 10/3/2022 1342  Last data filed at 10/3/2022 0655  Gross per 24 hour   Intake 120 ml   Output 1100 ml   Net -980 ml     I/O last 3 completed shifts: In: 280 [P.O.:280]  Out: 1800 [Urine:1800]    Safety Concerns:     None    Impairments/Disabilities:      None    Nutrition Therapy:  Current Nutrition Therapy:   - Oral Diet:  Carb Control 5 carbs/meal (2000kcals/day)    Routes of Feeding: None  Liquids: No Restrictions  Daily Fluid Restriction: no  Last Modified Barium Swallow with Video (Video Swallowing Test): not done    Treatments at the Time of Hospital Discharge:   Respiratory Treatments:none  Oxygen Therapy:  is not on home oxygen therapy. Ventilator:    {MH CC Vent BXOI:199901791}    Rehab Therapies: Physical Therapy and Occupational Therapy  Weight Bearing Status/Restrictions: No weight bearing restrictions  Other Medical Equipment (for information only, NOT a DME order):  na  Other Treatments: home PD    Patient's personal belongings (please select all that are sent with patient):   With pt    RN SIGNATURE:  jeff SANCHEZ    CASE MANAGEMENT/SOCIAL WORK SECTION    Inpatient Status Date: 10/1/2022    Readmission Risk Assessment Score:  Readmission Risk              Risk of Unplanned Readmission:  27           Discharging to Facility/ Agency   Name:   Address:  Phone:  Fax:    Dialysis Facility (if applicable)   Name:  Address:  Dialysis Schedule:  Phone:  Fax:    / signature: Radha Padron RN, BSN  [phone]     PHYSICIAN SECTION    Prognosis: Good    Condition at Discharge: Stable    Rehab Potential (if transferring to Rehab): Good    Recommended Labs or Other Treatments After Discharge: PD     Physician Certification: I certify the above information and transfer of Kimberly Tracey  is necessary for the continuing treatment of the diagnosis listed and that he requires {Admit to Appropriate Level of Care:09573} for {GREATER/LESS:237263296} 30 days.      Update Admission H&P: {CHP DME Changes in JVOGY:051021512}    PHYSICIAN SIGNATURE:  {Esignature:[id number]}

## 2022-10-03 NOTE — PROGRESS NOTES
Disconnected from [facility] per protocol. Effluent: Clear/ pale yellow   Total time: 8 hr 30 min   Total UF:  372 ml. Total Volume:  7497 ml. Dwell time gained:  0 hr 39 min. Pt Tolerated procedure:  Well   Report given to: [facility] RN

## 2022-10-03 NOTE — PROCEDURES
Patient: Rajiv Linton    MR Number: [id number]  YOB: 1965  Date of Visit: 10/3/2022    Clinical History:  The patient is a 56y.o. years old male with recurrent seizure      Method: The EEG was performed utilizing the international 10/20 of electrode placements of both referential and bipolar montages. The patient was awake and drowsy through out the recording. Photic stimulation was performed. Findings: The background of the EEG showed normal alpha posterior background of 9-10 --- HZ and amplitude of 20-40 UV. This background was symmetric, waxing and waning, and reactive with eye opening and closure. As the patient became drowsy, generalized diffuse slowing was seen through recording at 6-7 HZ. This generalized slowing was symmetric, non rhythmical, and continuous. No spike or sharp waves were seen. Photic stimulation did not activate EEG. Impression: This EEG  is within normal limits. There is no evidence of epileptiform discharges, focal, or lateralizing abnormalities.       Alden Childers MD      Board certified in clinical neurophysiology
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
## 2022-10-03 NOTE — PROGRESS NOTES
Joanna Trandong  Neurology Follow-up  [facility] Neurology    Date of Service: 10/3/2022    Subjective:   CC: Follow up today regarding: Breakthrough seizure    Events noted. Chart and lab reviewed. The patient denies any headache, dysphagia or dysarthria, weakness or numbness or tingling. No new symptoms today. EEG is normal.  Other review of system was unremarkable. ROS : A 10-12 system review obtained and updated today and is unremarkable except as mentioned  in my interval history. family history includes Arthritis in his brother; Coronary Art Dis in his father and paternal grandmother; Diabetes in his father and paternal grandmother; Hypertension in his mother; Mult Sclerosis in his brother; Other in his mother and sister; Sleep Apnea in his brother.     Past Medical History:   Diagnosis Date    Angina at rest [facility])     [facility]     Carotid artery stenosis 10/25/2016    SUZANNA stented with 8 x 30 mm non-tapered Xact stent    CHF (congestive heart failure) ([facility] [address] 3/3/15    EF 30%     CKD (chronic kidney disease) stage 2, GFR 60-89 ml/min     Coronary artery disease     sp CABG [facility]    Diabetic peripheral neuropathy ([facility])     Diastolic HF (heart failure) ([facility])     Hyperlipidemia with target LDL less than 70     Hypertension goal BP (blood pressure) < 130/80     PVD (peripheral vascular disease) ([facility])     Seizures ([facility])     Type 1 diabetes mellitus with chronic kidney disease ([facility]CC)     c-peptide <0.1 in 2015     Current Facility-Administered Medications   Medication Dose Route Frequency Provider Last Rate Last Admin    insulin glargine (LANTUS) injection vial 30 Units  30 Units SubCUTAneous Nightly Fely Dejesus MD   30 Units at 10/02/22 2113    insulin lispro (HUMALOG) injection vial 8 Units  8 Units SubCUTAneous TID  Fely Dejesus MD   5 Units at 10/02/22 1901    naloxegol (MOVANTIK) tablet 25 mg  25 mg Oral QAM AC Myriam Savage MD   25 mg at 10/03/22 0641    furosemide (LASIX) tablet 80 mg  80 mg Oral Daily Radha Proctor MD        calcitRIOL (ROCALTROL) capsule 0.5 mcg  0.5 mcg Oral Once per day on Mon Tue Wed Thu Fri Radha Proctor MD        ondansetron [facility]) injection 4 mg  4 mg IntraVENous Q1H PRN Diana Solorzano MD   4 mg at 10/02/22 2116    sodium bicarbonate 8.4 % injection 25 mEq  25 mEq IntraVENous Once Diana Solorzano MD        calcium gluconate 1000 mg in sodium chloride 50 mL  1,000 mg IntraVENous Once Diana Solorzano MD        thiamine mononitrate tablet 100 mg  100 mg Oral Daily Luma Martinez, DO   100 mg at 10/02/22 1010    dextrose bolus 10% 125 mL  125 mL IntraVENous PRN Luma Martinez DO        Or    dextrose bolus 10% 250 mL  250 mL IntraVENous PRN Luma Martinez DO        magnesium sulfate 1000 mg in dextrose 5% 100 mL IVPB  1,000 mg IntraVENous PRN Luma Martinez DO        carvedilol (COREG) tablet 25 mg  25 mg Oral BID  Vega Blanchard, DO   25 mg at 10/02/22 1903    clopidogrel (PLAVIX) tablet 75 mg  75 mg Oral Daily Vega Blanchard DO   75 mg at 10/02/22 1010    hydrALAZINE (APRESOLINE) tablet 100 mg  100 mg Oral 3 times per day Luma Martinez, DO   100 mg at 10/03/22 0641    isosorbide mononitrate (IMDUR) extended release tablet 30 mg  30 mg Oral Daily Luma Martinez, DO   30 mg at 10/02/22 1010    vitamin B-12 (CYANOCOBALAMIN) tablet 1,000 mcg  1,000 mcg Oral Daily Luma Martinez, DO   1,000 mcg at 10/02/22 1015    ranolazine (RANEXA) extended release tablet 500 mg  500 mg Oral BID Luma Martinez, DO   500 mg at 10/02/22 2108    rosuvastatin (CRESTOR) tablet 10 mg  10 mg Oral Nightly Luma Martinez, DO   10 mg at 10/02/22 2108    sacubitril-valsartan (ENTRESTO) 24-26 MG per tablet 1 tablet  1 tablet Oral BID Luma Martinez, DO   1 tablet at 10/02/22 2108    heparin (porcine) injection 5,000 Units  5,000 Units SubCUTAneous 3 times per day Luma Martinez DO   5,000 Units at 10/03/22 0641    polyethylene glycol (GLYCOLAX) packet 17 g  17 g Oral Daily PRN Gilford Kleine Terlep, DO        acetaminophen (TYLENOL) tablet 650 mg  650 mg Oral Q6H PRN Aaron Pacific, DO        Or    acetaminophen (TYLENOL) suppository 650 mg  650 mg Rectal Q6H PRN Aaron Pacific, DO        pantoprazole (PROTONIX) injection 40 mg  40 mg IntraVENous Daily Aaron Pacific, DO   40 mg at 10/02/22 1008    aspirin chewable tablet 81 mg  81 mg Oral Daily Bryan Redd MD   81 mg at 10/02/22 1010    glucagon (rDNA) injection 1 mg  1 mg SubCUTAneous PRN Bryan Redd MD        dextrose 10 % infusion   IntraVENous Continuous PRN Bryan Redd MD        insulin lispro (HUMALOG) injection vial 0-8 Units  0-8 Units SubCUTAneous TID WC Bryan Redd MD        insulin lispro (HUMALOG) injection vial 0-4 Units  0-4 Units SubCUTAneous Nightly Bryan Redd MD        gentamicin (GARAMYCIN) 0.1 % cream   Topical Daily Carin Harley MD   Given at 10/02/22 1621    sennosides-docusate sodium (SENOKOT-S) 8.6-50 MG tablet 2 tablet  2 tablet Oral BID Bryan Redd MD   2 tablet at 10/02/22 2108     Allergies   Allergen Reactions    Iodides Anaphylaxis    Shellfish-Derived Products Anaphylaxis    Atorvastatin Calcium Rash      reports that he has never smoked. He has never used smokeless tobacco. He reports current alcohol use. He reports that he does not use drugs. Objective:  Exam:   Constitutional:   Vitals:    10/03/22 0504 10/03/22 0505 10/03/22 0506 10/03/22 0507   BP:       Pulse: 77 78 79 78   Resp: 16 12 16 14   Temp:       TempSrc:       SpO2: 100% 99% 100% 100%   Weight:       Height:         General appearance:  Normal development and appear in no acute distress. Mental Status:   Oriented to person, place, problem, and time. Memory: Good immediate recall. Intact remote memory  Normal attention span and concentration. Language: intact naming, repeating and fluency   Good fund of Knowledge. Cranial Nerves:   II: Visual fields: Full.    Pupils: equal, round, reactive to light  III,IV,VI: Extra Ocular Movements are intact. No nystagmus  V: Facial sensation is intact  VII: Facial strength and movements: intact and symmetric  IX: Palate elevation is symmetric  XI: Shoulder shrug is intact  XII: Tongue movements are normal  Musculoskeletal: 5/5 in all 4 extremities. Tone: Normal tone. Reflexes: Symmetric 2+ in both arms and legs. Coordination: no pronator drift, no dysmetria with FNF  Sensation: normal to all modalities in both arms and legs. Gait/Posture: steady gait        Data:  LABS:   Lab Results   Component Value Date/Time     10/03/2022 04:45 AM    K 4.0 10/03/2022 04:45 AM    K 7.0 10/01/2022 03:56 AM    CL 98 10/03/2022 04:45 AM    CO2 25 10/03/2022 04:45 AM    BUN 64 10/03/2022 04:45 AM    CREATININE 8.0 10/03/2022 04:45 AM    GFRAA 9 10/03/2022 04:45 AM    GFRAA 44 06/15/2013 01:00 AM    LABGLOM 7 10/03/2022 04:45 AM    GLUCOSE 88 10/03/2022 04:45 AM    PHOS 5.5 10/03/2022 04:45 AM    MG 2.20 10/03/2022 04:45 AM    CALCIUM 9.2 10/03/2022 04:45 AM     Lab Results   Component Value Date/Time    WBC 4.2 10/03/2022 04:45 AM    RBC 4.88 10/03/2022 04:45 AM    HGB 14.6 10/03/2022 04:45 AM    HCT 44.6 10/03/2022 04:45 AM    MCV 91.2 10/03/2022 04:45 AM    RDW 13.7 10/03/2022 04:45 AM     10/03/2022 04:45 AM     Lab Results   Component Value Date    INR 1.00 10/03/2022    PROTIME 13.1 10/03/2022       Neuroimaging was independently reviewed by me and discussed results with the patient  I reviewed blood testing and other test results and discussed results with the patient      Impression:  Breakthrough seizure in a patient with known history of childhood epilepsy. EEG was normal.  No seizure since he was 8years old. Could be symptomatic from hyperglycemia. Continue seizure precautions  No AED unless symptoms recur  Discussed seizure precaution with the patient.   No driving, risk of status  Diabetic control  He voiced understanding  Follow-up with neurology if symptoms recur                   Marielos Pereyra MD   [phone]      This dictation was generated by voice recognition computer software. Although all attempts are made to edit the dictation for accuracy, there may be errors in the transcription that are not intended.

## 2022-10-04 PROBLEM — Z86.79 HISTORY OF ATRIAL FIBRILLATION: Status: ACTIVE | Noted: 2022-10-04

## 2022-10-05 LAB
BODY FLUID CULTURE, STERILE: NORMAL
GRAM STAIN RESULT: NORMAL

## 2022-10-15 DIAGNOSIS — I25.83 CORONARY ARTERY DISEASE DUE TO LIPID RICH PLAQUE: ICD-10-CM

## 2022-10-15 DIAGNOSIS — N18.4 TYPE 1 DIABETES MELLITUS WITH STAGE 4 CHRONIC KIDNEY DISEASE (HCC): ICD-10-CM

## 2022-10-15 DIAGNOSIS — E10.22 TYPE 1 DIABETES MELLITUS WITH STAGE 4 CHRONIC KIDNEY DISEASE (HCC): ICD-10-CM

## 2022-10-15 DIAGNOSIS — I25.10 CORONARY ARTERY DISEASE DUE TO LIPID RICH PLAQUE: ICD-10-CM

## 2022-10-17 RX ORDER — BLOOD-GLUCOSE SENSOR
EACH MISCELLANEOUS
Qty: 3 EACH | Refills: 0 | Status: SHIPPED | OUTPATIENT
Start: 2022-10-17

## 2022-10-17 RX ORDER — RANOLAZINE 500 MG/1
TABLET, EXTENDED RELEASE ORAL
Qty: 60 TABLET | Refills: 5 | OUTPATIENT
Start: 2022-10-17

## 2022-10-17 NOTE — TELEPHONE ENCOUNTER
Medication:   Requested Prescriptions     Pending Prescriptions Disp Refills    Continuous Blood Gluc Sensor ([address]) MISC [Pharmacy Med Name: Tanna Fish SENSOR] 3 each 0     Sig: USE AS DIRECTED CHANGE EVERY 10 DAYS       Last Filled:      Patient Phone Number: [phone] (home)     Last appt: 6/23/2022   Next appt: 12/12/22    Last Labs DM:   Lab Results   Component Value Date/Time    LABA1C 8.4 10/01/2022 02:20 PM

## 2022-11-22 DIAGNOSIS — I25.10 CORONARY ARTERY DISEASE DUE TO LIPID RICH PLAQUE: ICD-10-CM

## 2022-11-22 DIAGNOSIS — I25.83 CORONARY ARTERY DISEASE DUE TO LIPID RICH PLAQUE: ICD-10-CM

## 2022-11-22 RX ORDER — RANOLAZINE 500 MG/1
TABLET, EXTENDED RELEASE ORAL
Qty: 60 TABLET | Refills: 5 | OUTPATIENT
Start: 2022-11-22

## 2022-11-22 RX ORDER — CLOPIDOGREL BISULFATE 75 MG/1
75 TABLET ORAL DAILY
Qty: 90 TABLET | Refills: 3 | Status: SHIPPED | OUTPATIENT
Start: 2022-11-22

## 2022-11-23 ENCOUNTER — TELEPHONE (OUTPATIENT)
Dept: CARDIOLOGY | Age: 57
End: 2022-11-23

## 2022-11-23 NOTE — TELEPHONE ENCOUNTER
Patient called today to get refill for ranexa. Sapna refused refill. He missed appt with Jeramy Naik 10/5 because he was in the hospital.  He is in [place] on vacation. I refilled #60 with 1 refill. Advised him to make appt.   DANII

## 2022-11-25 NOTE — TELEPHONE ENCOUNTER
Last OV: 3/3/22 npts  Last Labs: 10/1/22 ekg  Last refill: 9/20/22  Next appt: 12/27/22 davion (my chart visit)

## 2022-11-28 RX ORDER — CARVEDILOL 25 MG/1
TABLET ORAL
Qty: 180 TABLET | Refills: 1 | Status: SHIPPED | OUTPATIENT
Start: 2022-11-28

## 2023-01-18 ENCOUNTER — TELEPHONE (OUTPATIENT)
Dept: ENDOCRINOLOGY | Age: 58
End: 2023-01-18

## 2023-01-18 DIAGNOSIS — N18.4 TYPE 1 DIABETES MELLITUS WITH STAGE 4 CHRONIC KIDNEY DISEASE (HCC): Primary | ICD-10-CM

## 2023-01-18 DIAGNOSIS — E10.22 TYPE 1 DIABETES MELLITUS WITH STAGE 4 CHRONIC KIDNEY DISEASE (HCC): Primary | ICD-10-CM

## 2023-01-18 RX ORDER — BLOOD-GLUCOSE TRANSMITTER
EACH MISCELLANEOUS
Qty: 1 EACH | Refills: 1 | Status: SHIPPED | OUTPATIENT
Start: 2023-01-18

## 2023-01-18 NOTE — TELEPHONE ENCOUNTER
Medication:   Requested Prescriptions     Pending Prescriptions Disp Refills    Continuous Blood Gluc Transmit (DEXCOM G6 TRANSMITTER) MISC 1 each 1     Sig: Change every 90 days       Last Filled:      Patient Phone Number: [phone] (home)     Last appt: Visit date not found   Next appt: 5/1/2023    Last Labs DM:   Lab Results   Component Value Date/Time    LABA1C 8.4 10/01/2022 02:20 PM

## 2023-01-18 NOTE — TELEPHONE ENCOUNTER
The patient needs Dex Com G6 transmitter.     Please send order to Marylou Underwood on \[facility]\"".     He scheduled follow up for May 1st.     His phone

## 2023-01-19 ENCOUNTER — TELEPHONE (OUTPATIENT)
Dept: ENDOCRINOLOGY | Age: 58
End: 2023-01-19

## 2023-01-19 NOTE — TELEPHONE ENCOUNTER
Please call patient and schedule a virtual visit. Ask patient to bring in pump for downloading a few days prior to visit and he can  an order for an A1c at that time.

## 2023-01-19 NOTE — TELEPHONE ENCOUNTER
Patient not seen since 6/22. Missed last appt. Please have him schedule sooner VV.  He can have A1c and pump download prior to the visit

## 2023-02-02 DIAGNOSIS — I25.10 CORONARY ARTERY DISEASE DUE TO LIPID RICH PLAQUE: ICD-10-CM

## 2023-02-02 DIAGNOSIS — I25.83 CORONARY ARTERY DISEASE DUE TO LIPID RICH PLAQUE: ICD-10-CM

## 2023-02-02 RX ORDER — RANOLAZINE 500 MG/1
TABLET, EXTENDED RELEASE ORAL
Qty: 60 TABLET | Refills: 5 | OUTPATIENT
Start: 2023-02-02

## 2023-02-02 NOTE — TELEPHONE ENCOUNTER
hasnt been in since march '22  ; NS a couple off appts  If [address] wants to refill, forward

## 2023-02-15 RX ORDER — NITROGLYCERIN 0.4 MG/1
TABLET SUBLINGUAL
Qty: 25 TABLET | Refills: 2 | Status: SHIPPED | OUTPATIENT
Start: 2023-02-15

## 2023-02-17 ENCOUNTER — TELEPHONE (OUTPATIENT)
Dept: CARDIOLOGY CLINIC | Age: 58
End: 2023-02-17

## 2023-03-13 ENCOUNTER — OFFICE VISIT (OUTPATIENT)
Dept: CARDIOLOGY CLINIC | Age: 58
End: 2023-03-13
Payer: MEDICARE

## 2023-03-13 VITALS
OXYGEN SATURATION: 100 % | DIASTOLIC BLOOD PRESSURE: 72 MMHG | WEIGHT: 197 LBS | HEIGHT: 67 IN | BODY MASS INDEX: 30.92 KG/M2 | HEART RATE: 84 BPM | SYSTOLIC BLOOD PRESSURE: 144 MMHG

## 2023-03-13 DIAGNOSIS — I25.5 ISCHEMIC CARDIOMYOPATHY: Primary | ICD-10-CM

## 2023-03-13 DIAGNOSIS — E78.2 MIXED HYPERLIPIDEMIA: ICD-10-CM

## 2023-03-13 DIAGNOSIS — I25.10 CORONARY ARTERY DISEASE DUE TO LIPID RICH PLAQUE: ICD-10-CM

## 2023-03-13 DIAGNOSIS — I25.83 CORONARY ARTERY DISEASE DUE TO LIPID RICH PLAQUE: ICD-10-CM

## 2023-03-13 DIAGNOSIS — I10 PRIMARY HYPERTENSION: ICD-10-CM

## 2023-03-13 PROCEDURE — 99214 OFFICE O/P EST MOD 30 MIN: CPT | Performed by: NURSE PRACTITIONER

## 2023-03-13 PROCEDURE — 3077F SYST BP >= 140 MM HG: CPT | Performed by: NURSE PRACTITIONER

## 2023-03-13 PROCEDURE — G8417 CALC BMI ABV UP PARAM F/U: HCPCS | Performed by: NURSE PRACTITIONER

## 2023-03-13 PROCEDURE — G8484 FLU IMMUNIZE NO ADMIN: HCPCS | Performed by: NURSE PRACTITIONER

## 2023-03-13 PROCEDURE — 3078F DIAST BP <80 MM HG: CPT | Performed by: NURSE PRACTITIONER

## 2023-03-13 PROCEDURE — 1036F TOBACCO NON-USER: CPT | Performed by: NURSE PRACTITIONER

## 2023-03-13 PROCEDURE — G8427 DOCREV CUR MEDS BY ELIG CLIN: HCPCS | Performed by: NURSE PRACTITIONER

## 2023-03-13 PROCEDURE — 3017F COLORECTAL CA SCREEN DOC REV: CPT | Performed by: NURSE PRACTITIONER

## 2023-03-13 RX ORDER — RANOLAZINE 500 MG/1
TABLET, EXTENDED RELEASE ORAL
Qty: 60 TABLET | Refills: 5 | Status: SHIPPED | OUTPATIENT
Start: 2023-03-13

## 2023-03-13 RX ORDER — SACUBITRIL AND VALSARTAN 24; 26 MG/1; MG/1
1 TABLET, FILM COATED ORAL 2 TIMES DAILY
Qty: 60 TABLET | Refills: 5 | Status: SHIPPED | OUTPATIENT
Start: 2023-03-13

## 2023-03-13 NOTE — PROGRESS NOTES
[address]     Outpatient Follow Up Note    Tess Andrade is 56 y.o. male who presents today with a history of CAD s/p CABG '01, s/p PTCA SVG > OM-1, HTN, CM/EF 35% and hyperlipidemia. His other hx includes: ESRD, CVA, carotid stenosis s/p Rt CEA '16 (Dr. Mac Benjamin), s/p balloon angioplasty SUZANNA April '22    per NE's note: referred to [address] transplant for kidney-pancreas transplant (had been on hold with LVEF < 35%)    CHIEF COMPLAINT / HPI:  Follow Up secondary to CM & CAD  His BP has been ~ 112/70    Subjective:   He's doing a lot of walking at his new job ( for [employer]). He had a couple of chest twinges so he slowed his walking down. He hadn't follow through with sleep apnea treatment. He denies SOB but feels winded from deconditioning. He has no swelling. His wt hovers 192.5 to 195# with a dry wt of 194.3#. he does home peritoneal exchanges. He continues to void good amts. He ran out of [employer] a couple of weeks ago waiting on approval for refills. His angina (which was a twisting, knife sensation in his chest). He denies recurrence. The patient is not experiencing palpitations or dizziness. His BP runs 120-130/70-80s. He hasn't had to take prn clonidine    These symptoms are stable since the last OV. With regard to medication therapy the patient has been compliant with prescribed regimen. They have tolerated therapy to date.      Past Medical History:   Diagnosis Date    Angina at rest [facility])     Card[facility]     Carotid artery stenosis 10/25/2016    SUZANNA stented with 8 x 30 mm non-tapered Xact stent    CHF (congestive heart failure) ([facility] 3/3/15    EF 30%     CKD (chronic kidney disease) stage 2, GFR 60-89 ml/min     Coronary artery disease     sp CABG UC    Diabetic peripheral neuropathy (HCC)     Diastolic HF (heart failure) (HCC)     Hyperlipidemia with target LDL less than 70     Hypertension goal BP (blood pressure) < 130/80     PVD (peripheral vascular disease) ([facility]     Seizures ([facility]     Type 1 diabetes mellitus with chronic kidney disease (HCC)     c-peptide <0.1 in 2015     Social History:    Social History     Tobacco Use   Smoking Status Never   Smokeless Tobacco Never     Current Medications:  Current Outpatient Medications   Medication Sig Dispense Refill    ranolazine (RANEXA) 500 MG extended release tablet TAKE ONE TABLET BY MOUTH TWICE A DAY 60 tablet 0    nitroGLYCERIN (NITROSTAT) 0.4 MG SL tablet DISSOLVE 1 TAB UNDER TONGUE FOR CHEST PAIN - IF PAIN REMAINS AFTER 5 MIN, CALL 911 AND REPEAT DOSE.  MAX 3 TABS IN 15 MINUTES 25 tablet 2    Continuous Blood Gluc Transmit (DEXCOM G6 TRANSMITTER) MISC Change every 90 days 1 each 1    carvedilol (COREG) 25 MG tablet TAKE ONE TABLET BY MOUTH TWICE A  tablet 1    clopidogrel (PLAVIX) 75 MG tablet Take 1 tablet by mouth daily 90 tablet 3    Continuous Blood Gluc Sensor (DEXCOM G6 SENSOR) MISC USE AS DIRECTED CHANGE EVERY 10 DAYS 3 each 0    metoclopramide (REGLAN) 5 MG tablet Take 1 tablet by mouth 4 times daily (with meals and nightly) 120 tablet 1    ondansetron (ZOFRAN) 4 MG tablet Take 1 tablet by mouth 3 times daily as needed for Nausea or Vomiting 30 tablet 0    ezetimibe (ZETIA) 10 MG tablet TAKE ONE TABLET BY MOUTH DAILY 30 tablet 0    cloNIDine (CATAPRES) 0.1 MG tablet TAKE ONE TABLET BY MOUTH TWICE A DAY 60 tablet 5    insulin aspart (NOVOLOG) 100 UNIT/ML injection vial 30-40 units daily via pump 20 mL 3    calcitRIOL (ROCALTROL) 0.25 MCG capsule Take 0.5 mcg by mouth daily      furosemide (LASIX) 80 MG tablet Take 80 mg by mouth 2 times daily      sacubitril-valsartan (ENTRESTO) 24-26 MG per tablet Take 1 tablet by mouth 2 times daily      hydrALAZINE (APRESOLINE) 100 MG tablet TAKE ONE TABLET BY MOUTH THREE TIMES A DAY 90 tablet 1    isosorbide mononitrate (IMDUR) 30 MG extended release tablet Take 30 mg by mouth daily      aspirin 81 MG EC tablet Take 1 tablet by mouth daily 30 tablet 3    rosuvastatin (CRESTOR) 40 MG tablet Take 1 tablet by mouth nightly 30 tablet 3    Cholecalciferol (VITAMIN D3 ULTRA POTENCY) 1.25 MG (27149 UT) TABS Take 50,000 Units by mouth once a week (on Saturdays)      blood glucose test strips (ACCU-CHEK GUIDE) strip TEST BLOOD SUGAR 6 TIMES A  strip 5    Methylcobalamin (B-12) 1000 MCG TBDP Place 1,000 mcg under the tongue daily       No current facility-administered medications for this visit. REVIEW OF SYSTEMS:    CONSTITUTIONAL: No major weight gain or loss, fatigue, weakness, night sweats or fever. HEENT: No new vision difficulties or ringing in the ears. RESPIRATORY: No new SOB, PND, orthopnea or cough. CARDIOVASCULAR: See HPI  GI: No nausea, vomiting, diarrhea, constipation, abdominal pain or changes in bowel habits. : No urinary frequency, urgency, incontinence hematuria or dysuria. SKIN: No cyanosis or skin lesions. MUSCULOSKELETAL: No new muscle or joint pain. NEUROLOGICAL: No syncope or TIA-like symptoms. PSYCHIATRIC: No anxiety, pain, insomnia or depression    Objective:   PHYSICAL EXAM:        Vitals:    03/13/23 1026 03/13/23 1035 03/13/23 1053   BP: (!) 152/72 (!) 154/76 (!) 144/72   Site: Left Upper Arm Left Upper Arm Left Upper Arm   Position: Sitting Sitting    Cuff Size: Medium Adult Medium Adult Medium Adult   Pulse: 84     SpO2: 100%     Weight: 197 lb (89.4 kg)     Height: 5' 7\" (1.702 m)           VITALS:  BP (!) 144/72 (Site: Left Upper Arm, Cuff Size: Medium Adult)   Pulse 84   Ht 5' 7\" (1.702 m)   Wt 197 lb (89.4 kg)   SpO2 100%   BMI 30.85 kg/m²   CONSTITUTIONAL: Cooperative, no apparent distress, and appears well nourished / developed  NEUROLOGIC:  Awake and orientated to person, place and time. PSYCH: Calm affect. SKIN: Warm and dry. HEENT: Sclera non-icteric, normocephalic, neck supple, no elevation of JVP, normal carotid pulses with + bruits bilaterally; thyroid normal size.   LUNGS:  No increased work of breathing and clear to auscultation, no crackles or wheezing  CARDIOVASCULAR:  Regular rate with ectopy 88 and rhythm with no murmurs, gallops, rubs, or abnormal heart sounds, normal PMI. The apical impulses not displaced  JVP less than 8 cm H2O  Heart tones are crisp and normal  Cervical veins are not engorged  The carotid upstroke is normal in amplitude and contour without delay or bruit  JVP is not elevated  ABDOMEN:  Normal bowel sounds, non-distended and non-tender to palpation  EXT: no edema, no calf tenderness. Pulses are present bilaterally. DATA:    Lab Results   Component Value Date    ALT 17 10/01/2022    AST 18 10/01/2022    ALKPHOS 123 10/01/2022    BILITOT 0.5 10/01/2022     Lab Results   Component Value Date    CREATININE 8.0 (HH) 10/03/2022    BUN 64 (H) 10/03/2022     10/03/2022    K 4.0 10/03/2022    CL 98 (L) 10/03/2022    CO2 25 10/03/2022     Lab Results   Component Value Date    TSH 1.60 05/01/2020     Lab Results   Component Value Date    WBC 4.2 10/03/2022    HGB 14.6 10/03/2022    HCT 44.6 10/03/2022    MCV 91.2 10/03/2022     10/03/2022     No components found for: CHLPL  Lab Results   Component Value Date    TRIG 85 03/02/2021    TRIG 69 05/16/2020    TRIG 75 07/25/2019     Lab Results   Component Value Date    HDL 40 03/02/2021    HDL 47 05/16/2020    HDL 45 07/25/2019     Lab Results   Component Value Date    LDLCALC 153 (H) 03/02/2021    LDLCALC 54 05/16/2020    LDLCALC 125 (H) 07/25/2019     Lab Results   Component Value Date    LABVLDL 17 03/02/2021    LABVLDL 14 05/16/2020    LABVLDL 15 07/25/2019     Radiology Review:  Pertinent images / reports were reviewed as a part of this visit and reveals the following:       myoview stress: 4/8/21:  Summary    -There is a moderate sized lateral completely reversible defect c/w    ischemia. -LV function is mildly depressed with EF=45% and mild inferior lateral    hypokinesis. -Chest pain this am and under camera. Recommendation    Pt sent to ER for evaluation and probable cardiac cath in am         Cardiac cath: 4/9/21:  Findings:  Artery Findings/Result   LM Normal   % ostial   Cx 100% mid   % ostial   RCA Nondominant, Smaller RV marginal with mid 70% and distal 90% lesions supplying collateral to distal Cx (old finding), small but could potentially be stented if symptoms merit   S-OM Patent, 99% ostial with ABEL 1 flow   L-LAD patent   LVEDP 6   LVG 35%, inferior hk      Intervention:         PCI of SVG-OM1 with 3.5X23 Xience MARIIA (PD with 3.75NC throughout)     Post Cath Dx:       Severe disease as above, culprit SVG     Echo: 12/22/21:    Summary   -Normal left ventricle size and wall thickness.   -Overall left ventricular systolic function appears moderately reduced.   -Ejection fraction is visually estimated to be 35-40%. There appears to be inferior hypokinesis. -Grade II diastolic dysfunction with elevated LV filling pressures. -Mild mitral regurgitation is present.   -Trivial tricuspid regurgitation. Assessment:      Diagnosis Orders   1. Ischemic cardiomyopathy   ~stable   ~LVEF improved to 35-40% by echo Dec '21  ~inf HK, EF 35% on cath from 4/9/21  ~moderate hypokinesis of the basal-mid septal, basal-mid lateral,   basal-mid inferior and basal-mid inferolateral walls; EF 30-35% by echo 3/2/21  ~HK inferior and lateral walls with echo from May '20, EF 40%  ~lasix / hydralazine-isosorbide / carvedilol     2. Coronary artery disease involving native coronary artery of native heart without angina pectoris . ~stable : denies angina  ~s/p CABG '01  ~s/p PTCA SVG > OM-1 April '21 ( small RV marginal branch (mid 70%, distal 90%) could be stented if symptoms persist > see cath note)  ~DAPT / nitrate / Rebeca Osseo / BB / statin    3. Mixed hyperlipidemia   ~had not gotten labs undated  ~LDL not at goal March '21  ~crestor / zetia    4.  HTN (hypertension), benign   ~suboptimal on arrival ; reasonable with recheck. Had just recently taken am meds  ~has not need to take prn clonidine   ~has been without Entresto for ~ 2 weeks         I had the opportunity to review the clinical symptoms and presentation of Marco A Marquez:     1. Resume Entreto 24-26 mg bid   ~echo after 3 months  2. F/U in 3 months as scheduled     Overall the patient is stable from CV standpoint    I have addresed the patient's cardiac risk factors and adjusted pharmacologic treatment as needed. In addition, I have reinforced the need for patient directed risk factor modification. Further evaluation will be based upon the patient's clinical course and testing results. All questions and concerns were addressed to the patient Alternatives to my treatment were discussed. The patient is not currently smoking. The risks related to smoking were reviewed with the patient. Recommend maintaining a smoke-free lifestyle. Patient is on a beta-blocker  Patient is not on an ace-i/ARB : CKD following with Dr. Jagdeep Wang ; on hydralazine-isosorbide combination   Patient is on a statin    Dual Antiplatelet therapy has been recommended / prescribed for this patient. Education conducted on adverse reactions including bleeding was discussed. The patient verbalizes understanding not to stop medications without discussing with us. Discussed exercise: 30-60 minutes 7 days/week  Discussed Low saturated fat/RUBIA diet.  SMBG 194 via Dexcom    Thank you for allowing to us to participate in the care of JAYLEN Owen    Documentation of today's visit sent to PCP

## 2023-03-14 ENCOUNTER — TELEPHONE (OUTPATIENT)
Dept: CARDIOLOGY CLINIC | Age: 58
End: 2023-03-14

## 2023-03-28 DIAGNOSIS — I25.83 CORONARY ARTERY DISEASE DUE TO LIPID RICH PLAQUE: ICD-10-CM

## 2023-03-28 DIAGNOSIS — I25.10 CORONARY ARTERY DISEASE DUE TO LIPID RICH PLAQUE: ICD-10-CM

## 2023-03-28 NOTE — TELEPHONE ENCOUNTER
[address] [place] [address] requesting refill on ranexa Last ov TS  3/13/23 dx cad cm htn hld med verified next ov 6/723 [facility]

## 2023-03-29 RX ORDER — RANOLAZINE 500 MG/1
TABLET, EXTENDED RELEASE ORAL
Qty: 60 TABLET | Refills: 5 | Status: SHIPPED | OUTPATIENT
Start: 2023-03-29

## 2023-05-01 ENCOUNTER — OFFICE VISIT (OUTPATIENT)
Dept: ENDOCRINOLOGY | Age: 58
End: 2023-05-01
Payer: MEDICARE

## 2023-05-01 VITALS
OXYGEN SATURATION: 97 % | WEIGHT: 201.6 LBS | SYSTOLIC BLOOD PRESSURE: 142 MMHG | BODY MASS INDEX: 31.64 KG/M2 | RESPIRATION RATE: 14 BRPM | HEIGHT: 67 IN | HEART RATE: 84 BPM | DIASTOLIC BLOOD PRESSURE: 86 MMHG | TEMPERATURE: 97 F

## 2023-05-01 DIAGNOSIS — E10.21 DIABETIC NEPHROPATHY ASSOCIATED WITH TYPE 1 DIABETES MELLITUS (HCC): Primary | ICD-10-CM

## 2023-05-01 LAB — HBA1C MFR BLD: 7.7 %

## 2023-05-01 PROCEDURE — 3077F SYST BP >= 140 MM HG: CPT | Performed by: INTERNAL MEDICINE

## 2023-05-01 PROCEDURE — 99214 OFFICE O/P EST MOD 30 MIN: CPT | Performed by: INTERNAL MEDICINE

## 2023-05-01 PROCEDURE — 3079F DIAST BP 80-89 MM HG: CPT | Performed by: INTERNAL MEDICINE

## 2023-05-01 PROCEDURE — G8417 CALC BMI ABV UP PARAM F/U: HCPCS | Performed by: INTERNAL MEDICINE

## 2023-05-01 PROCEDURE — 1036F TOBACCO NON-USER: CPT | Performed by: INTERNAL MEDICINE

## 2023-05-01 PROCEDURE — 95251 CONT GLUC MNTR ANALYSIS I&R: CPT | Performed by: INTERNAL MEDICINE

## 2023-05-01 PROCEDURE — 3046F HEMOGLOBIN A1C LEVEL >9.0%: CPT | Performed by: INTERNAL MEDICINE

## 2023-05-01 PROCEDURE — 3017F COLORECTAL CA SCREEN DOC REV: CPT | Performed by: INTERNAL MEDICINE

## 2023-05-01 PROCEDURE — 83036 HEMOGLOBIN GLYCOSYLATED A1C: CPT | Performed by: INTERNAL MEDICINE

## 2023-05-01 PROCEDURE — G8427 DOCREV CUR MEDS BY ELIG CLIN: HCPCS | Performed by: INTERNAL MEDICINE

## 2023-05-01 PROCEDURE — 2022F DILAT RTA XM EVC RTNOPTHY: CPT | Performed by: INTERNAL MEDICINE

## 2023-05-01 RX ORDER — INSULIN ASPART 100 [IU]/ML
INJECTION, SOLUTION INTRAVENOUS; SUBCUTANEOUS
Qty: 20 ML | Refills: 3 | Status: SHIPPED | OUTPATIENT
Start: 2023-05-01

## 2023-05-01 NOTE — PROGRESS NOTES
Seen as f/u patient for diabetes    Interim:   Seen after   States not hungry during the day but at night has snacks  Inquires about gastric scan    Using Control -IQ  Needs A1c < 8 % for tranplant    Will be evaluated for kidney / pancreas transplant  ESRD  On dialysis - PD  Back on pump    Reports had some bruising swelling in Pectoral area  Seen cardiology     ER visit for hypoglycemia- ate less  10/19 DKA- pump not working  States lost insurance  Has dexcom/t slim    Diagnosed with Type 1 diabetes mellitus since age 17    Known diabetic complications: Nephropathy, Retinopathy, Neuropathy  Severe, uncontrolled    Current diabetic medications     New settings:    Basal   MN 0.675  6am  0.625  I:C  1:12  CF  1:58     Target 100    Previous:  Lantus 30 units HS  Humalog <140  No insulin  2 for 50 >150    Last A1c  7.7%<----8.7%<---- 7%<----7.7%<----10.3%<------11.5 % on <------ 8.4%<----8.2%<-----8% <------9% on <----8.3 on <------8.3 on 3/16<--- 9.0 on 8/15<------8.6 on 5/15<----- 9.8 on 3/15<---9.8<---10.0    Prior visit with dietician: None since Age 17  Current diet: on average, 5 meals per day not familiar with CHO counting, seeing dietician, eats a snack at night as awake/has insomnia  Current exercise: walking   Current monitoring regimen: home blood tests -6-7  times daily     Has brought blood glucose log/meter: yes  Home blood sugar records:  Dexcom download  Last 2 weeks  Average 234  29% in range  31 % high    Night highs, reports snacks    Any episodes of hypoglycemia? 56  Has brittle DM    H/o CAD s/p CABG     Hyperlipidemia:controlled, on statin   LDL 71 on 3/15  crestor 40mg  LDL 54 on   Last eye exam:   Last foot exam:   Last microalbumin to creatinine ratio:ESRD    HTN: for years, uncontrolled  ACE-I or ARB:  coreg 50/25  Hydralazine 100mg TID  norvasc 5mg  LAURA Ab 25 H    FH: Mom- CAD  Dad  DM, CAD  at 61 age  [de-identified] had MS,  in 46s    Past Medical

## 2023-05-08 ENCOUNTER — TELEPHONE (OUTPATIENT)
Dept: ENDOCRINOLOGY | Age: 58
End: 2023-05-08

## 2023-05-08 NOTE — TELEPHONE ENCOUNTER
Humalog sent AMA (saw a physician/midlevel provider and clinician was able to provide reasons for staying for treatment & form is signed)

## 2023-05-08 NOTE — TELEPHONE ENCOUNTER
The drug you are requesting prior authorization for is not covered. Please select an alternative drug from the choices provided and send in a new prescription for that drug. HUMALOG VIAL 10ML 100U/ML   INSULIN LISPRO(BRAND)[id number] 100U/ML   HUMALOG VIAL 10ML 100U/ML   NOVOLOG VIAL 10ML 100U/ML   INSULIN LISPRO(BRAND)[id number] 100U/ML    Can the pt be switched to one of these covered drugs? Please advise. Thanks!

## 2023-05-09 DIAGNOSIS — E10.22 TYPE 1 DIABETES MELLITUS WITH STAGE 4 CHRONIC KIDNEY DISEASE (HCC): ICD-10-CM

## 2023-05-09 DIAGNOSIS — N18.4 TYPE 1 DIABETES MELLITUS WITH STAGE 4 CHRONIC KIDNEY DISEASE (HCC): ICD-10-CM

## 2023-05-09 RX ORDER — INSULIN ASPART 100 [IU]/ML
INJECTION, SOLUTION INTRAVENOUS; SUBCUTANEOUS
Qty: 20 ML | Refills: 3 | Status: SHIPPED | OUTPATIENT
Start: 2023-05-09

## 2023-05-09 RX ORDER — PROCHLORPERAZINE 25 MG/1
SUPPOSITORY RECTAL
Qty: 3 EACH | Refills: 3 | Status: SHIPPED | OUTPATIENT
Start: 2023-05-09

## 2023-05-09 RX ORDER — PROCHLORPERAZINE 25 MG/1
SUPPOSITORY RECTAL
Qty: 1 EACH | Refills: 1 | Status: SHIPPED | OUTPATIENT
Start: 2023-05-09

## 2023-05-09 NOTE — TELEPHONE ENCOUNTER
Ke from [address] [place] [address] requesting refill for patient    Patient new to pharmacy    Patient requesting 90 supply if possible    [address] [place] [address] [phone]        Continuous Blood Gluc Transmit (DEXCOM G6 TRANSMITTER) MISC   Medication  Continuous Blood Gluc Sensor (DEXCOM G6 SENSOR) MISC [722880]  Continuous Blood Gluc Sensor (DEXCOM G6 SENSOR) MISC

## 2023-05-09 NOTE — TELEPHONE ENCOUNTER
Medication:   Requested Prescriptions     Pending Prescriptions Disp Refills    Continuous Blood Gluc Sensor (DEXCOM G6 SENSOR) MISC 3 each 3     Sig: USE AS DIRECTED CHANGE EVERY 10 DAYS    Continuous Blood Gluc Transmit (DEXCOM G6 TRANSMITTER) MISC 1 each 1     Sig: Change every 90 days       Last Filled:      Patient Phone Number: [phone] (home)     Last appt: 5/1/2023   Next appt: 8/21/2023    Last Labs DM:   Lab Results   Component Value Date/Time    LABA1C 7.7 05/01/2023 03:18 PM

## 2023-05-09 NOTE — TELEPHONE ENCOUNTER
Medication:   Requested Prescriptions     Pending Prescriptions Disp Refills    insulin aspart (NOVOLOG) 100 UNIT/ML injection vial 20 mL 3     Si-40 units daily via pump       Last Filled:      Patient Phone Number: [phone] (home)     Last appt: 2023   Next appt: 2023    Last Labs DM:   Lab Results   Component Value Date/Time    LABA1C 7.7 2023 03:18 PM

## 2023-06-07 ENCOUNTER — HOSPITAL ENCOUNTER (OUTPATIENT)
Dept: NON INVASIVE DIAGNOSTICS | Age: 58
Discharge: HOME | End: 2023-06-07
Payer: MEDICARE

## 2023-06-07 DIAGNOSIS — I25.5 ISCHEMIC CARDIOMYOPATHY: ICD-10-CM

## 2023-06-07 LAB
LV EF: 35 %
LVEF MODALITY: NORMAL

## 2023-06-07 PROCEDURE — 6360000004 HC RX CONTRAST MEDICATION: Performed by: INTERNAL MEDICINE

## 2023-06-07 PROCEDURE — C8929 TTE W OR WO FOL WCON,DOPPLER: HCPCS

## 2023-06-07 RX ADMIN — PERFLUTREN 1.5 ML: 6.52 INJECTION, SUSPENSION INTRAVENOUS at 09:09

## 2023-06-08 ENCOUNTER — OFFICE VISIT (OUTPATIENT)
Dept: CARDIOLOGY CLINIC | Age: 58
End: 2023-06-08
Payer: MEDICARE

## 2023-06-08 VITALS
HEIGHT: 67 IN | DIASTOLIC BLOOD PRESSURE: 82 MMHG | SYSTOLIC BLOOD PRESSURE: 142 MMHG | HEART RATE: 74 BPM | WEIGHT: 200 LBS | BODY MASS INDEX: 31.39 KG/M2 | OXYGEN SATURATION: 100 %

## 2023-06-08 DIAGNOSIS — I25.83 CORONARY ARTERY DISEASE DUE TO LIPID RICH PLAQUE: ICD-10-CM

## 2023-06-08 DIAGNOSIS — E78.2 MIXED HYPERLIPIDEMIA: ICD-10-CM

## 2023-06-08 DIAGNOSIS — I25.5 ISCHEMIC CARDIOMYOPATHY: Primary | ICD-10-CM

## 2023-06-08 DIAGNOSIS — I25.10 CORONARY ARTERY DISEASE DUE TO LIPID RICH PLAQUE: ICD-10-CM

## 2023-06-08 DIAGNOSIS — I10 HTN (HYPERTENSION), BENIGN: ICD-10-CM

## 2023-06-08 PROCEDURE — 3078F DIAST BP <80 MM HG: CPT | Performed by: NURSE PRACTITIONER

## 2023-06-08 PROCEDURE — 1036F TOBACCO NON-USER: CPT | Performed by: NURSE PRACTITIONER

## 2023-06-08 PROCEDURE — G8427 DOCREV CUR MEDS BY ELIG CLIN: HCPCS | Performed by: NURSE PRACTITIONER

## 2023-06-08 PROCEDURE — G8417 CALC BMI ABV UP PARAM F/U: HCPCS | Performed by: NURSE PRACTITIONER

## 2023-06-08 PROCEDURE — 99214 OFFICE O/P EST MOD 30 MIN: CPT | Performed by: NURSE PRACTITIONER

## 2023-06-08 PROCEDURE — 3074F SYST BP LT 130 MM HG: CPT | Performed by: NURSE PRACTITIONER

## 2023-06-08 PROCEDURE — 3017F COLORECTAL CA SCREEN DOC REV: CPT | Performed by: NURSE PRACTITIONER

## 2023-06-08 RX ORDER — CLONIDINE HYDROCHLORIDE 0.1 MG/1
0.1 TABLET ORAL NIGHTLY PRN
COMMUNITY

## 2023-06-08 NOTE — PROGRESS NOTES
Education conducted on adverse reactions including bleeding was discussed. The patient verbalizes understanding not to stop medications without discussing with us. Discussed exercise: 30-60 minutes 7 days/week  Discussed Low saturated fat/RUBIA diet.  SMBG 101 via Dexcom    Thank you for allowing to us to participate in the care of JAYLEN Buck    Documentation of today's visit sent to PCP

## 2023-07-04 ENCOUNTER — APPOINTMENT (OUTPATIENT)
Dept: GENERAL RADIOLOGY | Age: 58
End: 2023-07-04
Payer: COMMERCIAL

## 2023-07-04 ENCOUNTER — HOSPITAL ENCOUNTER (INPATIENT)
Age: 58
LOS: 3 days | Discharge: HOME HEALTH | End: 2023-07-07
Attending: EMERGENCY MEDICINE | Admitting: INTERNAL MEDICINE
Payer: COMMERCIAL

## 2023-07-04 ENCOUNTER — APPOINTMENT (OUTPATIENT)
Dept: CT IMAGING | Age: 58
End: 2023-07-04
Payer: COMMERCIAL

## 2023-07-04 DIAGNOSIS — G45.9 TIA (TRANSIENT ISCHEMIC ATTACK): Primary | ICD-10-CM

## 2023-07-04 PROBLEM — G93.41 ACUTE METABOLIC ENCEPHALOPATHY: Status: ACTIVE | Noted: 2023-07-04

## 2023-07-04 PROBLEM — Z86.73 HISTORY OF STROKE: Status: ACTIVE | Noted: 2023-07-04

## 2023-07-04 LAB
ALBUMIN SERPL-MCNC: 3.4 G/DL (ref 3.4–5)
ALBUMIN/GLOB SERPL: 1.1 {RATIO} (ref 1.1–2.2)
ALP SERPL-CCNC: 144 U/L (ref 40–129)
ALT SERPL-CCNC: 10 U/L (ref 10–40)
AMMONIA PLAS-SCNC: 19 UMOL/L (ref 16–60)
ANION GAP SERPL CALCULATED.3IONS-SCNC: 17 MMOL/L (ref 3–16)
AST SERPL-CCNC: 17 U/L (ref 15–37)
BASOPHILS # BLD: 0 K/UL (ref 0–0.2)
BASOPHILS NFR BLD: 0.8 %
BILIRUB SERPL-MCNC: 0.3 MG/DL (ref 0–1)
BUN SERPL-MCNC: 35 MG/DL (ref 7–20)
CALCIUM SERPL-MCNC: 8.2 MG/DL (ref 8.3–10.6)
CHLORIDE SERPL-SCNC: 103 MMOL/L (ref 99–110)
CO2 SERPL-SCNC: 16 MMOL/L (ref 21–32)
CREAT SERPL-MCNC: 6.5 MG/DL (ref 0.9–1.3)
DEPRECATED RDW RBC AUTO: 15.5 % (ref 12.4–15.4)
EOSINOPHIL # BLD: 0.2 K/UL (ref 0–0.6)
EOSINOPHIL NFR BLD: 3.9 %
GFR SERPLBLD CREATININE-BSD FMLA CKD-EPI: 9 ML/MIN/{1.73_M2}
GLUCOSE BLD-MCNC: 121 MG/DL (ref 70–99)
GLUCOSE BLD-MCNC: 254 MG/DL (ref 70–99)
GLUCOSE BLD-MCNC: 61 MG/DL (ref 70–99)
GLUCOSE BLD-MCNC: 77 MG/DL (ref 70–99)
GLUCOSE BLD-MCNC: 99 MG/DL (ref 70–99)
GLUCOSE SERPL-MCNC: 125 MG/DL (ref 70–99)
HCT VFR BLD AUTO: 48 % (ref 40.5–52.5)
HGB BLD-MCNC: 15.5 G/DL (ref 13.5–17.5)
LYMPHOCYTES # BLD: 0.9 K/UL (ref 1–5.1)
LYMPHOCYTES NFR BLD: 15.7 %
MCH RBC QN AUTO: 29.1 PG (ref 26–34)
MCHC RBC AUTO-ENTMCNC: 32.2 G/DL (ref 31–36)
MCV RBC AUTO: 90.3 FL (ref 80–100)
MONOCYTES # BLD: 1 K/UL (ref 0–1.3)
MONOCYTES NFR BLD: 16 %
NEUTROPHILS # BLD: 3.8 K/UL (ref 1.7–7.7)
NEUTROPHILS NFR BLD: 63.6 %
PERFORMED ON: ABNORMAL
PERFORMED ON: NORMAL
PERFORMED ON: NORMAL
PLATELET # BLD AUTO: 142 K/UL (ref 135–450)
PMV BLD AUTO: 10.5 FL (ref 5–10.5)
POTASSIUM SERPL-SCNC: 3.7 MMOL/L (ref 3.5–5.1)
PROT SERPL-MCNC: 6.6 G/DL (ref 6.4–8.2)
RBC # BLD AUTO: 5.31 M/UL (ref 4.2–5.9)
SODIUM SERPL-SCNC: 136 MMOL/L (ref 136–145)
TROPONIN, HIGH SENSITIVITY: 94 NG/L (ref 0–22)
WBC # BLD AUTO: 6 K/UL (ref 4–11)

## 2023-07-04 PROCEDURE — 70498 CT ANGIOGRAPHY NECK: CPT

## 2023-07-04 PROCEDURE — 96374 THER/PROPH/DIAG INJ IV PUSH: CPT

## 2023-07-04 PROCEDURE — 80053 COMPREHEN METABOLIC PANEL: CPT

## 2023-07-04 PROCEDURE — 85025 COMPLETE CBC W/AUTO DIFF WBC: CPT

## 2023-07-04 PROCEDURE — 6360000002 HC RX W HCPCS: Performed by: INTERNAL MEDICINE

## 2023-07-04 PROCEDURE — 2580000003 HC RX 258: Performed by: PHYSICIAN ASSISTANT

## 2023-07-04 PROCEDURE — A4216 STERILE WATER/SALINE, 10 ML: HCPCS | Performed by: PHYSICIAN ASSISTANT

## 2023-07-04 PROCEDURE — 2580000003 HC RX 258: Performed by: INTERNAL MEDICINE

## 2023-07-04 PROCEDURE — 93005 ELECTROCARDIOGRAM TRACING: CPT | Performed by: PHYSICIAN ASSISTANT

## 2023-07-04 PROCEDURE — 96375 TX/PRO/DX INJ NEW DRUG ADDON: CPT

## 2023-07-04 PROCEDURE — 99285 EMERGENCY DEPT VISIT HI MDM: CPT

## 2023-07-04 PROCEDURE — 6360000004 HC RX CONTRAST MEDICATION: Performed by: PHYSICIAN ASSISTANT

## 2023-07-04 PROCEDURE — 70450 CT HEAD/BRAIN W/O DYE: CPT

## 2023-07-04 PROCEDURE — 36415 COLL VENOUS BLD VENIPUNCTURE: CPT

## 2023-07-04 PROCEDURE — 2500000003 HC RX 250 WO HCPCS: Performed by: PHYSICIAN ASSISTANT

## 2023-07-04 PROCEDURE — 6370000000 HC RX 637 (ALT 250 FOR IP): Performed by: INTERNAL MEDICINE

## 2023-07-04 PROCEDURE — 82140 ASSAY OF AMMONIA: CPT

## 2023-07-04 PROCEDURE — 6360000002 HC RX W HCPCS: Performed by: PHYSICIAN ASSISTANT

## 2023-07-04 PROCEDURE — 2060000000 HC ICU INTERMEDIATE R&B

## 2023-07-04 PROCEDURE — 84484 ASSAY OF TROPONIN QUANT: CPT

## 2023-07-04 PROCEDURE — 83036 HEMOGLOBIN GLYCOSYLATED A1C: CPT

## 2023-07-04 PROCEDURE — 71045 X-RAY EXAM CHEST 1 VIEW: CPT

## 2023-07-04 RX ORDER — POLYETHYLENE GLYCOL 3350 17 G/17G
17 POWDER, FOR SOLUTION ORAL DAILY PRN
Status: DISCONTINUED | OUTPATIENT
Start: 2023-07-04 | End: 2023-07-07 | Stop reason: HOSPADM

## 2023-07-04 RX ORDER — ONDANSETRON 4 MG/1
4 TABLET, ORALLY DISINTEGRATING ORAL EVERY 8 HOURS PRN
Status: DISCONTINUED | OUTPATIENT
Start: 2023-07-04 | End: 2023-07-07 | Stop reason: HOSPADM

## 2023-07-04 RX ORDER — ONDANSETRON 4 MG/1
4 TABLET, ORALLY DISINTEGRATING ORAL EVERY 8 HOURS PRN
Status: DISCONTINUED | OUTPATIENT
Start: 2023-07-04 | End: 2023-07-04

## 2023-07-04 RX ORDER — ASPIRIN 300 MG/1
300 SUPPOSITORY RECTAL DAILY
Status: DISCONTINUED | OUTPATIENT
Start: 2023-07-05 | End: 2023-07-07 | Stop reason: HOSPADM

## 2023-07-04 RX ORDER — RANOLAZINE 500 MG/1
500 TABLET, EXTENDED RELEASE ORAL 2 TIMES DAILY
Status: DISCONTINUED | OUTPATIENT
Start: 2023-07-04 | End: 2023-07-07 | Stop reason: HOSPADM

## 2023-07-04 RX ORDER — ROSUVASTATIN CALCIUM 10 MG/1
40 TABLET, COATED ORAL NIGHTLY
Status: DISCONTINUED | OUTPATIENT
Start: 2023-07-04 | End: 2023-07-07 | Stop reason: HOSPADM

## 2023-07-04 RX ORDER — SODIUM CHLORIDE 0.9 % (FLUSH) 0.9 %
5-40 SYRINGE (ML) INJECTION EVERY 12 HOURS SCHEDULED
Status: DISCONTINUED | OUTPATIENT
Start: 2023-07-04 | End: 2023-07-07 | Stop reason: HOSPADM

## 2023-07-04 RX ORDER — NITROGLYCERIN 0.4 MG/1
0.4 TABLET SUBLINGUAL EVERY 5 MIN PRN
Status: DISCONTINUED | OUTPATIENT
Start: 2023-07-04 | End: 2023-07-04

## 2023-07-04 RX ORDER — DEXTROSE MONOHYDRATE 100 MG/ML
INJECTION, SOLUTION INTRAVENOUS CONTINUOUS PRN
Status: DISCONTINUED | OUTPATIENT
Start: 2023-07-04 | End: 2023-07-07 | Stop reason: HOSPADM

## 2023-07-04 RX ORDER — HYDRALAZINE HYDROCHLORIDE 25 MG/1
100 TABLET, FILM COATED ORAL 2 TIMES DAILY
Status: DISCONTINUED | OUTPATIENT
Start: 2023-07-04 | End: 2023-07-05

## 2023-07-04 RX ORDER — INSULIN LISPRO 100 [IU]/ML
0-4 INJECTION, SOLUTION INTRAVENOUS; SUBCUTANEOUS
Status: DISCONTINUED | OUTPATIENT
Start: 2023-07-05 | End: 2023-07-07 | Stop reason: HOSPADM

## 2023-07-04 RX ORDER — ERGOCALCIFEROL 1.25 MG/1
50000 CAPSULE ORAL WEEKLY
Status: DISCONTINUED | OUTPATIENT
Start: 2023-07-04 | End: 2023-07-07 | Stop reason: HOSPADM

## 2023-07-04 RX ORDER — EZETIMIBE 10 MG/1
10 TABLET ORAL DAILY
Status: DISCONTINUED | OUTPATIENT
Start: 2023-07-05 | End: 2023-07-04 | Stop reason: RX

## 2023-07-04 RX ORDER — ASPIRIN 81 MG/1
81 TABLET ORAL DAILY
Status: DISCONTINUED | OUTPATIENT
Start: 2023-07-05 | End: 2023-07-04

## 2023-07-04 RX ORDER — DIPHENHYDRAMINE HYDROCHLORIDE 50 MG/ML
50 INJECTION INTRAMUSCULAR; INTRAVENOUS ONCE
Status: COMPLETED | OUTPATIENT
Start: 2023-07-04 | End: 2023-07-04

## 2023-07-04 RX ORDER — CLONIDINE HYDROCHLORIDE 0.1 MG/1
0.1 TABLET ORAL NIGHTLY PRN
Status: DISCONTINUED | OUTPATIENT
Start: 2023-07-04 | End: 2023-07-04

## 2023-07-04 RX ORDER — INSULIN LISPRO 100 [IU]/ML
0-4 INJECTION, SOLUTION INTRAVENOUS; SUBCUTANEOUS NIGHTLY
Status: DISCONTINUED | OUTPATIENT
Start: 2023-07-04 | End: 2023-07-07 | Stop reason: HOSPADM

## 2023-07-04 RX ORDER — ONDANSETRON 2 MG/ML
4 INJECTION INTRAMUSCULAR; INTRAVENOUS EVERY 6 HOURS PRN
Status: DISCONTINUED | OUTPATIENT
Start: 2023-07-04 | End: 2023-07-04

## 2023-07-04 RX ORDER — SODIUM CHLORIDE 9 MG/ML
INJECTION, SOLUTION INTRAVENOUS CONTINUOUS
Status: ACTIVE | OUTPATIENT
Start: 2023-07-04 | End: 2023-07-05

## 2023-07-04 RX ORDER — ACETAMINOPHEN 325 MG/1
650 TABLET ORAL EVERY 6 HOURS PRN
Status: DISCONTINUED | OUTPATIENT
Start: 2023-07-04 | End: 2023-07-07 | Stop reason: HOSPADM

## 2023-07-04 RX ORDER — HEPARIN SODIUM 5000 [USP'U]/ML
5000 INJECTION, SOLUTION INTRAVENOUS; SUBCUTANEOUS EVERY 8 HOURS SCHEDULED
Status: DISCONTINUED | OUTPATIENT
Start: 2023-07-05 | End: 2023-07-07 | Stop reason: HOSPADM

## 2023-07-04 RX ORDER — SODIUM CHLORIDE 9 MG/ML
INJECTION, SOLUTION INTRAVENOUS PRN
Status: DISCONTINUED | OUTPATIENT
Start: 2023-07-04 | End: 2023-07-07 | Stop reason: HOSPADM

## 2023-07-04 RX ORDER — ISOSORBIDE MONONITRATE 30 MG/1
30 TABLET, EXTENDED RELEASE ORAL DAILY
Status: DISCONTINUED | OUTPATIENT
Start: 2023-07-05 | End: 2023-07-07 | Stop reason: HOSPADM

## 2023-07-04 RX ORDER — CARVEDILOL 25 MG/1
25 TABLET ORAL 2 TIMES DAILY
Status: DISCONTINUED | OUTPATIENT
Start: 2023-07-04 | End: 2023-07-07 | Stop reason: HOSPADM

## 2023-07-04 RX ORDER — ASPIRIN 81 MG/1
81 TABLET ORAL DAILY
Status: DISCONTINUED | OUTPATIENT
Start: 2023-07-05 | End: 2023-07-07 | Stop reason: HOSPADM

## 2023-07-04 RX ORDER — ACETAMINOPHEN 650 MG/1
650 SUPPOSITORY RECTAL EVERY 6 HOURS PRN
Status: DISCONTINUED | OUTPATIENT
Start: 2023-07-04 | End: 2023-07-07 | Stop reason: HOSPADM

## 2023-07-04 RX ORDER — ONDANSETRON 2 MG/ML
4 INJECTION INTRAMUSCULAR; INTRAVENOUS EVERY 6 HOURS PRN
Status: DISCONTINUED | OUTPATIENT
Start: 2023-07-04 | End: 2023-07-07 | Stop reason: HOSPADM

## 2023-07-04 RX ORDER — SODIUM CHLORIDE 0.9 % (FLUSH) 0.9 %
5-40 SYRINGE (ML) INJECTION PRN
Status: DISCONTINUED | OUTPATIENT
Start: 2023-07-04 | End: 2023-07-07 | Stop reason: HOSPADM

## 2023-07-04 RX ORDER — CLOPIDOGREL BISULFATE 75 MG/1
75 TABLET ORAL DAILY
Status: DISCONTINUED | OUTPATIENT
Start: 2023-07-05 | End: 2023-07-07 | Stop reason: HOSPADM

## 2023-07-04 RX ADMIN — SACUBITRIL AND VALSARTAN 1 TABLET: 49; 51 TABLET, FILM COATED ORAL at 22:13

## 2023-07-04 RX ADMIN — HYDRALAZINE HYDROCHLORIDE 100 MG: 25 TABLET, FILM COATED ORAL at 21:43

## 2023-07-04 RX ADMIN — IOHEXOL 75 ML: 350 INJECTION, SOLUTION INTRAVENOUS at 17:32

## 2023-07-04 RX ADMIN — CARVEDILOL 25 MG: 25 TABLET, FILM COATED ORAL at 21:44

## 2023-07-04 RX ADMIN — SODIUM CHLORIDE: 9 INJECTION, SOLUTION INTRAVENOUS at 21:30

## 2023-07-04 RX ADMIN — ROSUVASTATIN 40 MG: 10 TABLET, FILM COATED ORAL at 21:43

## 2023-07-04 RX ADMIN — SODIUM CHLORIDE, PRESERVATIVE FREE 10 ML: 5 INJECTION INTRAVENOUS at 21:46

## 2023-07-04 RX ADMIN — CEFEPIME 1000 MG: 1 INJECTION, POWDER, FOR SOLUTION INTRAMUSCULAR; INTRAVENOUS at 22:17

## 2023-07-04 RX ADMIN — VANCOMYCIN HYDROCHLORIDE 1000 MG: 1 INJECTION, POWDER, LYOPHILIZED, FOR SOLUTION INTRAVENOUS at 23:28

## 2023-07-04 RX ADMIN — FAMOTIDINE 20 MG: 10 INJECTION, SOLUTION INTRAVENOUS at 17:34

## 2023-07-04 RX ADMIN — DIPHENHYDRAMINE HYDROCHLORIDE 50 MG: 50 INJECTION, SOLUTION INTRAMUSCULAR; INTRAVENOUS at 17:35

## 2023-07-04 RX ADMIN — RANOLAZINE 500 MG: 500 TABLET, EXTENDED RELEASE ORAL at 21:43

## 2023-07-04 RX ADMIN — METHYLPREDNISOLONE SODIUM SUCCINATE 125 MG: 125 INJECTION, POWDER, FOR SOLUTION INTRAMUSCULAR; INTRAVENOUS at 17:34

## 2023-07-04 ASSESSMENT — ENCOUNTER SYMPTOMS
VOMITING: 0
DIARRHEA: 0
COUGH: 0
SHORTNESS OF BREATH: 0
WHEEZING: 0
NAUSEA: 0
ABDOMINAL PAIN: 0
RHINORRHEA: 0

## 2023-07-04 NOTE — ACP (ADVANCE CARE PLANNING)
Advanced Care Planning Note. Purpose of Encounter: Advanced care planning in light of ESRD  Parties In Attendance: Patient,  wife  Decisional Capacity: Yes  Subjective: Patient is weak and tired  Objective: Cr 6.5  Goals of Care Determination: Patient wants full support (CPR, vent, surgery, HD, trach, PEG)  Plan:  IV Abx, MRI Brain, MRI L foot, Renal/Neuro/ID/Podiatry consults, PT/OT/SLP  Code Status: Full code   Time spent on Advanced care Plannin minutes  Advanced Care Planning Documents: Completed advanced directives on chart, wife is the POA.     Yeni Mariscal MD  2023 7:07 PM

## 2023-07-04 NOTE — H&P
CT HEAD WO CONTRAST   Final Result   No acute intracranial abnormality. High attenuation of the opacification of the right frontal sinus, likely   related to sinusitis including bacterial or fungal etiology. Results were reported to [facility] at 5:36 p.m. on July 4, 2023. Problem List  Principal Problem:    Acute metabolic encephalopathy  Active Problems:    History of atrial fibrillation    Coronary artery disease due to lipid rich plaque    DM type 1 (diabetes mellitus, type 1) ([facility])    PVD (peripheral vascular disease) ([facility])    S/P CABG (coronary artery bypass graft)    Atrial fibrillation with rapid ventricular response ([facility])    ESRD on peritoneal dialysis ([address])    History of stroke  Resolved Problems:    * No resolved hospital problems. *        Assessment/Plan:   Acute metabolic encephalopathy  Cannot exclude stroke  Neuro checks  Check MRI Brain to r/o stroke  Continue Plavix and ASA  Continue Crestor  Neuro consult to evaluate  Gentle IVF today  Had recent Echo in June 2023  Telemetry  L diabetic foot infection  Start IV Cefepime and Vanco  Check MRI L foot to r/o OM  Podiatry consult to evaluate  ID consult to guide Abx  ESRD on PD  Renal consult for ESRD  CAD  Continue ASA, Plavix, Coreg, Crestor, Entresto and Ranexa  PVD  Continue ASA, Plavix and Crestor  DM1  Continue home insulin pump  Hypoglycemia orders  Afib  Continue ASA, Plavix, Coreg  8. HTN   A. Continue Coreg, Imdur, Hydralazine, Entresto      DVT prophylaxis Hep Sq  Code status Full code  Diet NPO (stroke orders)  IV access Peripheral  Real Catheter No    Admit as inpatient. I anticipate hospitalization spanning more than two midnights for investigation and treatment of the above medically necessary diagnoses. Discussed with patient and wife.     Tracie Hernandez MD    7/4/2023 7:06 PM

## 2023-07-04 NOTE — ED PROVIDER NOTES
[address]        Pt Name: Nik Reese  MRN: [id number]  [address] [facility] 1965  Date of evaluation: 7/4/2023  Provider: Irwin Cha PA-C  PCP: Babak Biggs MD  Note Started: 5:37 PM EDT 7/4/23       I have seen and evaluated this patient with my supervising physician Dr. Yusuf Marte. CHIEF COMPLAINT       Chief Complaint   Patient presents with    Aphasia     Came by [facility] from home- wife noticed slurred speech and confusion- [id number]- Stroke alert called out in field- A&O x4        HISTORY OF PRESENT ILLNESS: 1 or more Elements     History From: patient   Limitations to history : None    Nik Reese is a 66 y.o. male who presents for evaluation of slurred speech, left-sided facial droop and left arm weakness that started on 415 this afternoon. Patient does have a history of ESRD and is on dialysis, also history of prior CVA and reports left leg weakness at baseline. He states that his head \"felt funny\" but he is already starting to feel better. He denies headache or spinning type dizziness. No chest pain or shortness of breath. No abdominal pain nausea vomiting. He has no other complaints or concerns at this time. Nursing Notes were all reviewed and agreed with or any disagreements were addressed in the HPI. REVIEW OF SYSTEMS :      Review of Systems   Constitutional:  Negative for appetite change, chills and fever. HENT:  Negative for congestion and rhinorrhea. Respiratory:  Negative for cough, shortness of breath and wheezing. Cardiovascular:  Negative for chest pain. Gastrointestinal:  Negative for abdominal pain, diarrhea, nausea and vomiting. Genitourinary:  Negative for difficulty urinating, dysuria and hematuria. Musculoskeletal:  Negative for neck pain and neck stiffness. Skin:  Negative for rash. Neurological:  Positive for facial asymmetry, speech difficulty and weakness. Negative for headaches.      Positives

## 2023-07-05 PROBLEM — Z71.89 DIABETES EDUCATION, ENCOUNTER FOR: Status: ACTIVE | Noted: 2023-07-05

## 2023-07-05 PROBLEM — G45.9 TIA (TRANSIENT ISCHEMIC ATTACK): Status: ACTIVE | Noted: 2023-07-05

## 2023-07-05 PROBLEM — L97.529 DIABETIC ULCER OF LEFT FOOT ASSOCIATED WITH TYPE 2 DIABETES MELLITUS (HCC): Status: ACTIVE | Noted: 2023-07-05

## 2023-07-05 PROBLEM — I67.9 CEREBROVASCULAR DISEASE, UNSPECIFIED: Status: ACTIVE | Noted: 2023-07-05

## 2023-07-05 PROBLEM — E11.621 DIABETIC ULCER OF LEFT FOOT ASSOCIATED WITH TYPE 2 DIABETES MELLITUS (HCC): Status: ACTIVE | Noted: 2023-07-05

## 2023-07-05 LAB
ANION GAP SERPL CALCULATED.3IONS-SCNC: 15 MMOL/L (ref 3–16)
BASOPHILS # BLD: 0 K/UL (ref 0–0.2)
BASOPHILS NFR BLD: 0.1 %
BUN SERPL-MCNC: 43 MG/DL (ref 7–20)
CALCIUM SERPL-MCNC: 8 MG/DL (ref 8.3–10.6)
CHLORIDE SERPL-SCNC: 94 MMOL/L (ref 99–110)
CHOLEST SERPL-MCNC: 128 MG/DL (ref 0–199)
CO2 SERPL-SCNC: 19 MMOL/L (ref 21–32)
CREAT SERPL-MCNC: 7 MG/DL (ref 0.9–1.3)
DEPRECATED RDW RBC AUTO: 15.1 % (ref 12.4–15.4)
EKG ATRIAL RATE: 75 BPM
EKG DIAGNOSIS: NORMAL
EKG P AXIS: 67 DEGREES
EKG P-R INTERVAL: 142 MS
EKG Q-T INTERVAL: 430 MS
EKG QRS DURATION: 98 MS
EKG QTC CALCULATION (BAZETT): 480 MS
EKG R AXIS: 105 DEGREES
EKG T AXIS: -49 DEGREES
EKG VENTRICULAR RATE: 75 BPM
EOSINOPHIL # BLD: 0 K/UL (ref 0–0.6)
EOSINOPHIL NFR BLD: 0 %
EST. AVERAGE GLUCOSE BLD GHB EST-MCNC: 208.7 MG/DL
GFR SERPLBLD CREATININE-BSD FMLA CKD-EPI: 9 ML/MIN/{1.73_M2}
GLUCOSE BLD-MCNC: 411 MG/DL (ref 70–99)
GLUCOSE BLD-MCNC: 457 MG/DL (ref 70–99)
GLUCOSE BLD-MCNC: 468 MG/DL (ref 70–99)
GLUCOSE BLD-MCNC: 501 MG/DL (ref 70–99)
GLUCOSE BLD-MCNC: 544 MG/DL (ref 70–99)
GLUCOSE BLD-MCNC: 573 MG/DL (ref 70–99)
GLUCOSE BLD-MCNC: 600 MG/DL (ref 70–99)
GLUCOSE BLD-MCNC: >600 MG/DL (ref 70–99)
GLUCOSE SERPL-MCNC: 602 MG/DL (ref 70–99)
HBA1C MFR BLD: 8.9 %
HCT VFR BLD AUTO: 46.7 % (ref 40.5–52.5)
HDLC SERPL-MCNC: 41 MG/DL (ref 40–60)
HGB BLD-MCNC: 15.1 G/DL (ref 13.5–17.5)
LDLC SERPL CALC-MCNC: 76 MG/DL
LYMPHOCYTES # BLD: 0.4 K/UL (ref 1–5.1)
LYMPHOCYTES NFR BLD: 6.7 %
MCH RBC QN AUTO: 29.1 PG (ref 26–34)
MCHC RBC AUTO-ENTMCNC: 32.3 G/DL (ref 31–36)
MCV RBC AUTO: 89.9 FL (ref 80–100)
MONOCYTES # BLD: 0.1 K/UL (ref 0–1.3)
MONOCYTES NFR BLD: 1 %
NEUTROPHILS # BLD: 5.9 K/UL (ref 1.7–7.7)
NEUTROPHILS NFR BLD: 92.2 %
PERFORMED ON: ABNORMAL
PLATELET # BLD AUTO: 127 K/UL (ref 135–450)
PMV BLD AUTO: 11 FL (ref 5–10.5)
POTASSIUM SERPL-SCNC: 4.3 MMOL/L (ref 3.5–5.1)
RBC # BLD AUTO: 5.19 M/UL (ref 4.2–5.9)
SODIUM SERPL-SCNC: 128 MMOL/L (ref 136–145)
TRIGL SERPL-MCNC: 55 MG/DL (ref 0–150)
VANCOMYCIN SERPL-MCNC: 16.6 UG/ML
VLDLC SERPL CALC-MCNC: 11 MG/DL
WBC # BLD AUTO: 6.4 K/UL (ref 4–11)

## 2023-07-05 PROCEDURE — 90945 DIALYSIS ONE EVALUATION: CPT

## 2023-07-05 PROCEDURE — 80061 LIPID PANEL: CPT

## 2023-07-05 PROCEDURE — 87186 SC STD MICRODIL/AGAR DIL: CPT

## 2023-07-05 PROCEDURE — 87070 CULTURE OTHR SPECIMN AEROBIC: CPT

## 2023-07-05 PROCEDURE — 80202 ASSAY OF VANCOMYCIN: CPT

## 2023-07-05 PROCEDURE — 6360000002 HC RX W HCPCS: Performed by: INTERNAL MEDICINE

## 2023-07-05 PROCEDURE — 6370000000 HC RX 637 (ALT 250 FOR IP): Performed by: INTERNAL MEDICINE

## 2023-07-05 PROCEDURE — 92610 EVALUATE SWALLOWING FUNCTION: CPT

## 2023-07-05 PROCEDURE — 99223 1ST HOSP IP/OBS HIGH 75: CPT | Performed by: INTERNAL MEDICINE

## 2023-07-05 PROCEDURE — 99223 1ST HOSP IP/OBS HIGH 75: CPT | Performed by: PSYCHIATRY & NEUROLOGY

## 2023-07-05 PROCEDURE — 85025 COMPLETE CBC W/AUTO DIFF WBC: CPT

## 2023-07-05 PROCEDURE — 87181 SC STD AGAR DILUTION PER AGT: CPT

## 2023-07-05 PROCEDURE — 97535 SELF CARE MNGMENT TRAINING: CPT

## 2023-07-05 PROCEDURE — 92526 ORAL FUNCTION THERAPY: CPT

## 2023-07-05 PROCEDURE — 93010 ELECTROCARDIOGRAM REPORT: CPT | Performed by: INTERNAL MEDICINE

## 2023-07-05 PROCEDURE — 2060000000 HC ICU INTERMEDIATE R&B

## 2023-07-05 PROCEDURE — 97165 OT EVAL LOW COMPLEX 30 MIN: CPT

## 2023-07-05 PROCEDURE — 87077 CULTURE AEROBIC IDENTIFY: CPT

## 2023-07-05 PROCEDURE — 36415 COLL VENOUS BLD VENIPUNCTURE: CPT

## 2023-07-05 PROCEDURE — 97116 GAIT TRAINING THERAPY: CPT

## 2023-07-05 PROCEDURE — 97530 THERAPEUTIC ACTIVITIES: CPT

## 2023-07-05 PROCEDURE — 80048 BASIC METABOLIC PNL TOTAL CA: CPT

## 2023-07-05 PROCEDURE — 87205 SMEAR GRAM STAIN: CPT

## 2023-07-05 PROCEDURE — 2580000003 HC RX 258: Performed by: INTERNAL MEDICINE

## 2023-07-05 PROCEDURE — 3E1M39Z IRRIGATION OF PERITONEAL CAVITY USING DIALYSATE, PERCUTANEOUS APPROACH: ICD-10-PCS | Performed by: INTERNAL MEDICINE

## 2023-07-05 PROCEDURE — 97161 PT EVAL LOW COMPLEX 20 MIN: CPT

## 2023-07-05 PROCEDURE — 83036 HEMOGLOBIN GLYCOSYLATED A1C: CPT

## 2023-07-05 RX ORDER — INSULIN GLARGINE 100 [IU]/ML
7 INJECTION, SOLUTION SUBCUTANEOUS ONCE
Status: COMPLETED | OUTPATIENT
Start: 2023-07-05 | End: 2023-07-05

## 2023-07-05 RX ORDER — SODIUM CHLORIDE, SODIUM LACTATE, CALCIUM CHLORIDE, MAGNESIUM CHLORIDE AND DEXTROSE 1.5; 538; 448; 18.3; 5.08 G/100ML; MG/100ML; MG/100ML; MG/100ML; MG/100ML
2500 INJECTION, SOLUTION INTRAPERITONEAL EVERY 6 HOURS
Status: DISCONTINUED | OUTPATIENT
Start: 2023-07-05 | End: 2023-07-05 | Stop reason: CLARIF

## 2023-07-05 RX ORDER — SODIUM CHLORIDE, SODIUM LACTATE, CALCIUM CHLORIDE, MAGNESIUM CHLORIDE AND DEXTROSE 1.5; 538; 448; 18.3; 5.08 G/100ML; MG/100ML; MG/100ML; MG/100ML; MG/100ML
2500 INJECTION, SOLUTION INTRAPERITONEAL CONTINUOUS
Status: DISCONTINUED | OUTPATIENT
Start: 2023-07-05 | End: 2023-07-06

## 2023-07-05 RX ORDER — GENTAMICIN SULFATE 1 MG/G
CREAM TOPICAL DAILY
Status: DISCONTINUED | OUTPATIENT
Start: 2023-07-05 | End: 2023-07-07 | Stop reason: HOSPADM

## 2023-07-05 RX ORDER — HYDRALAZINE HYDROCHLORIDE 100 MG/1
100 TABLET, FILM COATED ORAL EVERY 8 HOURS SCHEDULED
Status: DISCONTINUED | OUTPATIENT
Start: 2023-07-05 | End: 2023-07-06

## 2023-07-05 RX ORDER — SODIUM CHLORIDE, SODIUM LACTATE, CALCIUM CHLORIDE, MAGNESIUM CHLORIDE AND DEXTROSE 1.5; 538; 448; 18.3; 5.08 G/100ML; MG/100ML; MG/100ML; MG/100ML; MG/100ML
2500 INJECTION, SOLUTION INTRAPERITONEAL CONTINUOUS
Status: DISCONTINUED | OUTPATIENT
Start: 2023-07-05 | End: 2023-07-05

## 2023-07-05 RX ADMIN — ISOSORBIDE MONONITRATE 30 MG: 30 TABLET, EXTENDED RELEASE ORAL at 09:21

## 2023-07-05 RX ADMIN — GENTAMICIN SULFATE: 1 CREAM TOPICAL at 18:40

## 2023-07-05 RX ADMIN — HYDRALAZINE HYDROCHLORIDE 100 MG: 100 TABLET, FILM COATED ORAL at 21:18

## 2023-07-05 RX ADMIN — RANOLAZINE 500 MG: 500 TABLET, EXTENDED RELEASE ORAL at 20:16

## 2023-07-05 RX ADMIN — SODIUM CHLORIDE: 9 INJECTION, SOLUTION INTRAVENOUS at 21:21

## 2023-07-05 RX ADMIN — CARVEDILOL 25 MG: 25 TABLET, FILM COATED ORAL at 20:16

## 2023-07-05 RX ADMIN — NYSTATIN 500000 UNITS: 100000 SUSPENSION ORAL at 21:18

## 2023-07-05 RX ADMIN — ASPIRIN 81 MG: 81 TABLET, COATED ORAL at 09:21

## 2023-07-05 RX ADMIN — RANOLAZINE 500 MG: 500 TABLET, EXTENDED RELEASE ORAL at 09:21

## 2023-07-05 RX ADMIN — HEPARIN SODIUM 5000 UNITS: 5000 INJECTION INTRAVENOUS; SUBCUTANEOUS at 06:26

## 2023-07-05 RX ADMIN — HEPARIN SODIUM 5000 UNITS: 5000 INJECTION INTRAVENOUS; SUBCUTANEOUS at 21:18

## 2023-07-05 RX ADMIN — NYSTATIN 500000 UNITS: 100000 SUSPENSION ORAL at 17:20

## 2023-07-05 RX ADMIN — CLOPIDOGREL BISULFATE 75 MG: 75 TABLET ORAL at 09:21

## 2023-07-05 RX ADMIN — SACUBITRIL AND VALSARTAN 1 TABLET: 49; 51 TABLET, FILM COATED ORAL at 20:16

## 2023-07-05 RX ADMIN — SODIUM CHLORIDE, PRESERVATIVE FREE 10 ML: 5 INJECTION INTRAVENOUS at 09:22

## 2023-07-05 RX ADMIN — SACUBITRIL AND VALSARTAN 1 TABLET: 49; 51 TABLET, FILM COATED ORAL at 09:21

## 2023-07-05 RX ADMIN — INSULIN GLARGINE 7 UNITS: 100 INJECTION, SOLUTION SUBCUTANEOUS at 17:20

## 2023-07-05 RX ADMIN — HYDRALAZINE HYDROCHLORIDE 100 MG: 100 TABLET, FILM COATED ORAL at 15:10

## 2023-07-05 RX ADMIN — HYDRALAZINE HYDROCHLORIDE 100 MG: 25 TABLET, FILM COATED ORAL at 09:21

## 2023-07-05 RX ADMIN — INSULIN LISPRO 4 UNITS: 100 INJECTION, SOLUTION INTRAVENOUS; SUBCUTANEOUS at 17:20

## 2023-07-05 RX ADMIN — CARVEDILOL 25 MG: 25 TABLET, FILM COATED ORAL at 09:21

## 2023-07-05 RX ADMIN — CEFEPIME 1000 MG: 1 INJECTION, POWDER, FOR SOLUTION INTRAMUSCULAR; INTRAVENOUS at 10:08

## 2023-07-05 RX ADMIN — ROSUVASTATIN 40 MG: 10 TABLET, FILM COATED ORAL at 20:16

## 2023-07-05 RX ADMIN — HEPARIN SODIUM 5000 UNITS: 5000 INJECTION INTRAVENOUS; SUBCUTANEOUS at 13:25

## 2023-07-05 RX ADMIN — NYSTATIN 500000 UNITS: 100000 SUSPENSION ORAL at 13:25

## 2023-07-05 ASSESSMENT — ENCOUNTER SYMPTOMS
BACK PAIN: 0
RHINORRHEA: 0
ABDOMINAL PAIN: 0
SINUS PRESSURE: 0
WHEEZING: 0
CONSTIPATION: 0
SHORTNESS OF BREATH: 0
EYE DISCHARGE: 0
DIARRHEA: 0
COUGH: 0
SINUS PAIN: 0
EYE REDNESS: 0
SORE THROAT: 0
NAUSEA: 0

## 2023-07-05 ASSESSMENT — PAIN DESCRIPTION - LOCATION
LOCATION: FOOT

## 2023-07-05 ASSESSMENT — PAIN SCALES - GENERAL
PAINLEVEL_OUTOF10: 2
PAINLEVEL_OUTOF10: 3
PAINLEVEL_OUTOF10: 3

## 2023-07-05 ASSESSMENT — PAIN DESCRIPTION - ORIENTATION
ORIENTATION: LEFT

## 2023-07-05 NOTE — ED NOTES
Report given to 3T RN. No questions or concerns at this time. Pt will be taken to unit by wheelchair.       Janet Fuentes RN  07/04/23 2100

## 2023-07-06 ENCOUNTER — TELEPHONE (OUTPATIENT)
Dept: ENDOCRINOLOGY | Age: 58
End: 2023-07-06

## 2023-07-06 ENCOUNTER — APPOINTMENT (OUTPATIENT)
Dept: MRI IMAGING | Age: 58
End: 2023-07-06
Payer: COMMERCIAL

## 2023-07-06 PROBLEM — A49.8 PSEUDOMONAS AERUGINOSA INFECTION: Status: ACTIVE | Noted: 2023-07-06

## 2023-07-06 LAB
ANION GAP SERPL CALCULATED.3IONS-SCNC: 16 MMOL/L (ref 3–16)
BUN SERPL-MCNC: 58 MG/DL (ref 7–20)
CALCIUM SERPL-MCNC: 7.6 MG/DL (ref 8.3–10.6)
CHLORIDE SERPL-SCNC: 93 MMOL/L (ref 99–110)
CO2 SERPL-SCNC: 21 MMOL/L (ref 21–32)
CREAT SERPL-MCNC: 9 MG/DL (ref 0.9–1.3)
CRP SERPL-MCNC: 3.9 MG/L (ref 0–5.1)
DEPRECATED RDW RBC AUTO: 15.4 % (ref 12.4–15.4)
ERYTHROCYTE [SEDIMENTATION RATE] IN BLOOD BY WESTERGREN METHOD: 20 MM/HR (ref 0–20)
EST. AVERAGE GLUCOSE BLD GHB EST-MCNC: 208.7 MG/DL
GFR SERPLBLD CREATININE-BSD FMLA CKD-EPI: 6 ML/MIN/{1.73_M2}
GLUCOSE BLD-MCNC: 187 MG/DL (ref 70–99)
GLUCOSE BLD-MCNC: 315 MG/DL (ref 70–99)
GLUCOSE BLD-MCNC: 381 MG/DL (ref 70–99)
GLUCOSE BLD-MCNC: 383 MG/DL (ref 70–99)
GLUCOSE BLD-MCNC: 429 MG/DL (ref 70–99)
GLUCOSE SERPL-MCNC: 457 MG/DL (ref 70–99)
HBA1C MFR BLD: 8.9 %
HCT VFR BLD AUTO: 46.6 % (ref 40.5–52.5)
HGB BLD-MCNC: 14.9 G/DL (ref 13.5–17.5)
MAGNESIUM SERPL-MCNC: 2.1 MG/DL (ref 1.8–2.4)
MCH RBC QN AUTO: 28.8 PG (ref 26–34)
MCHC RBC AUTO-ENTMCNC: 32.1 G/DL (ref 31–36)
MCV RBC AUTO: 89.7 FL (ref 80–100)
PERFORMED ON: ABNORMAL
PHOSPHATE SERPL-MCNC: 4.5 MG/DL (ref 2.5–4.9)
PLATELET # BLD AUTO: 121 K/UL (ref 135–450)
PMV BLD AUTO: 10 FL (ref 5–10.5)
POTASSIUM SERPL-SCNC: 4.1 MMOL/L (ref 3.5–5.1)
RBC # BLD AUTO: 5.19 M/UL (ref 4.2–5.9)
SODIUM SERPL-SCNC: 130 MMOL/L (ref 136–145)
VANCOMYCIN SERPL-MCNC: 11.1 UG/ML
WBC # BLD AUTO: 9.3 K/UL (ref 4–11)

## 2023-07-06 PROCEDURE — 80048 BASIC METABOLIC PNL TOTAL CA: CPT

## 2023-07-06 PROCEDURE — 90945 DIALYSIS ONE EVALUATION: CPT

## 2023-07-06 PROCEDURE — 94760 N-INVAS EAR/PLS OXIMETRY 1: CPT

## 2023-07-06 PROCEDURE — 83735 ASSAY OF MAGNESIUM: CPT

## 2023-07-06 PROCEDURE — 2580000003 HC RX 258: Performed by: INTERNAL MEDICINE

## 2023-07-06 PROCEDURE — 36415 COLL VENOUS BLD VENIPUNCTURE: CPT

## 2023-07-06 PROCEDURE — 6370000000 HC RX 637 (ALT 250 FOR IP): Performed by: INTERNAL MEDICINE

## 2023-07-06 PROCEDURE — 84100 ASSAY OF PHOSPHORUS: CPT

## 2023-07-06 PROCEDURE — 73718 MRI LOWER EXTREMITY W/O DYE: CPT

## 2023-07-06 PROCEDURE — 85652 RBC SED RATE AUTOMATED: CPT

## 2023-07-06 PROCEDURE — 86140 C-REACTIVE PROTEIN: CPT

## 2023-07-06 PROCEDURE — 97116 GAIT TRAINING THERAPY: CPT

## 2023-07-06 PROCEDURE — 99233 SBSQ HOSP IP/OBS HIGH 50: CPT

## 2023-07-06 PROCEDURE — 85027 COMPLETE CBC AUTOMATED: CPT

## 2023-07-06 PROCEDURE — 6360000002 HC RX W HCPCS: Performed by: INTERNAL MEDICINE

## 2023-07-06 PROCEDURE — 99233 SBSQ HOSP IP/OBS HIGH 50: CPT | Performed by: INTERNAL MEDICINE

## 2023-07-06 PROCEDURE — 80202 ASSAY OF VANCOMYCIN: CPT

## 2023-07-06 PROCEDURE — 2060000000 HC ICU INTERMEDIATE R&B

## 2023-07-06 PROCEDURE — 70551 MRI BRAIN STEM W/O DYE: CPT

## 2023-07-06 RX ORDER — LACTOBACILLUS RHAMNOSUS GG 10B CELL
1 CAPSULE ORAL 2 TIMES DAILY WITH MEALS
Status: DISCONTINUED | OUTPATIENT
Start: 2023-07-06 | End: 2023-07-07 | Stop reason: HOSPADM

## 2023-07-06 RX ORDER — NIFEDIPINE 30 MG/1
30 TABLET, FILM COATED, EXTENDED RELEASE ORAL DAILY
Status: DISCONTINUED | OUTPATIENT
Start: 2023-07-06 | End: 2023-07-07 | Stop reason: HOSPADM

## 2023-07-06 RX ORDER — HYDRALAZINE HYDROCHLORIDE 100 MG/1
100 TABLET, FILM COATED ORAL 2 TIMES DAILY
Status: DISCONTINUED | OUTPATIENT
Start: 2023-07-06 | End: 2023-07-07 | Stop reason: HOSPADM

## 2023-07-06 RX ORDER — INSULIN GLARGINE 100 [IU]/ML
10 INJECTION, SOLUTION SUBCUTANEOUS NIGHTLY
Status: DISCONTINUED | OUTPATIENT
Start: 2023-07-06 | End: 2023-07-07 | Stop reason: HOSPADM

## 2023-07-06 RX ADMIN — CEFEPIME 1000 MG: 1 INJECTION, POWDER, FOR SOLUTION INTRAMUSCULAR; INTRAVENOUS at 12:32

## 2023-07-06 RX ADMIN — NIFEDIPINE 30 MG: 30 TABLET, EXTENDED RELEASE ORAL at 09:42

## 2023-07-06 RX ADMIN — HYDRALAZINE HYDROCHLORIDE 100 MG: 100 TABLET, FILM COATED ORAL at 21:47

## 2023-07-06 RX ADMIN — CARVEDILOL 25 MG: 25 TABLET, FILM COATED ORAL at 21:48

## 2023-07-06 RX ADMIN — INSULIN GLARGINE 10 UNITS: 100 INJECTION, SOLUTION SUBCUTANEOUS at 21:48

## 2023-07-06 RX ADMIN — VANCOMYCIN HYDROCHLORIDE 1250 MG: 1.25 INJECTION, POWDER, LYOPHILIZED, FOR SOLUTION INTRAVENOUS at 09:39

## 2023-07-06 RX ADMIN — CLOPIDOGREL BISULFATE 75 MG: 75 TABLET ORAL at 09:29

## 2023-07-06 RX ADMIN — HEPARIN SODIUM 5000 UNITS: 5000 INJECTION INTRAVENOUS; SUBCUTANEOUS at 14:36

## 2023-07-06 RX ADMIN — ROSUVASTATIN 40 MG: 10 TABLET, FILM COATED ORAL at 21:47

## 2023-07-06 RX ADMIN — HEPARIN SODIUM 5000 UNITS: 5000 INJECTION INTRAVENOUS; SUBCUTANEOUS at 21:47

## 2023-07-06 RX ADMIN — SODIUM CHLORIDE, PRESERVATIVE FREE 10 ML: 5 INJECTION INTRAVENOUS at 09:30

## 2023-07-06 RX ADMIN — ACETAMINOPHEN 650 MG: 325 TABLET ORAL at 17:14

## 2023-07-06 RX ADMIN — RANOLAZINE 500 MG: 500 TABLET, EXTENDED RELEASE ORAL at 21:47

## 2023-07-06 RX ADMIN — HEPARIN SODIUM 5000 UNITS: 5000 INJECTION INTRAVENOUS; SUBCUTANEOUS at 06:24

## 2023-07-06 RX ADMIN — INSULIN LISPRO 3 UNITS: 100 INJECTION, SOLUTION INTRAVENOUS; SUBCUTANEOUS at 17:14

## 2023-07-06 RX ADMIN — ASPIRIN 81 MG: 81 TABLET, COATED ORAL at 09:29

## 2023-07-06 RX ADMIN — HYDRALAZINE HYDROCHLORIDE 100 MG: 100 TABLET, FILM COATED ORAL at 06:24

## 2023-07-06 RX ADMIN — NYSTATIN 500000 UNITS: 100000 SUSPENSION ORAL at 09:30

## 2023-07-06 RX ADMIN — Medication 1 CAPSULE: at 17:14

## 2023-07-06 RX ADMIN — GENTAMICIN SULFATE: 1 CREAM TOPICAL at 21:47

## 2023-07-06 RX ADMIN — NYSTATIN 500000 UNITS: 100000 SUSPENSION ORAL at 14:36

## 2023-07-06 RX ADMIN — CARVEDILOL 25 MG: 25 TABLET, FILM COATED ORAL at 09:29

## 2023-07-06 RX ADMIN — SACUBITRIL AND VALSARTAN 1 TABLET: 49; 51 TABLET, FILM COATED ORAL at 21:47

## 2023-07-06 RX ADMIN — SODIUM CHLORIDE, PRESERVATIVE FREE 10 ML: 5 INJECTION INTRAVENOUS at 21:49

## 2023-07-06 RX ADMIN — ISOSORBIDE MONONITRATE 30 MG: 30 TABLET, EXTENDED RELEASE ORAL at 09:29

## 2023-07-06 RX ADMIN — SACUBITRIL AND VALSARTAN 1 TABLET: 49; 51 TABLET, FILM COATED ORAL at 09:29

## 2023-07-06 RX ADMIN — RANOLAZINE 500 MG: 500 TABLET, EXTENDED RELEASE ORAL at 09:29

## 2023-07-06 ASSESSMENT — ENCOUNTER SYMPTOMS
DIARRHEA: 0
ABDOMINAL PAIN: 0
WHEEZING: 0
SHORTNESS OF BREATH: 0
EYE DISCHARGE: 0
BACK PAIN: 0
NAUSEA: 0
EYE REDNESS: 0
RHINORRHEA: 0
CONSTIPATION: 0
SINUS PRESSURE: 0
SORE THROAT: 0
SINUS PAIN: 0
COUGH: 0

## 2023-07-06 ASSESSMENT — PAIN SCALES - GENERAL
PAINLEVEL_OUTOF10: 4
PAINLEVEL_OUTOF10: 3

## 2023-07-06 ASSESSMENT — PAIN DESCRIPTION - LOCATION
LOCATION: FOOT
LOCATION: HEAD

## 2023-07-06 ASSESSMENT — PAIN DESCRIPTION - ORIENTATION: ORIENTATION: LEFT

## 2023-07-06 ASSESSMENT — PAIN DESCRIPTION - DESCRIPTORS: DESCRIPTORS: ACHING

## 2023-07-06 NOTE — TELEPHONE ENCOUNTER
Pt calling and states Dexcom G 6 sensor needs Prior Auth.  That pharmacy was suppose to fax something over something this past Tuesday

## 2023-07-06 NOTE — CONSULTS
In patient Neurology consult        [facility] Neurology      MD Luann Betancourtmark Kaplansee  1965    Date of Service: 7/5/2023    Referring Physician: Christa Rios MD      Reason for the consult and CC: Acute encephalopathy, slurred speech and left-sided weakness. Concerning for new ischemic stroke    HPI:   The patient is a 66y.o.  years old male with past medical history of type 1 diabetes, CAD status post CABG, carotid stenosis status post right carotid stent and angioplasty, end-stage renal on PD, A-fib not on anticoagulation and other medical problems who comes to the hospital with acute confusion, left-sided weakness, slurred speech. Patient reports yesterday morning he woke up not feeling well. He reports generalized weakness and nausea. Patient with history of ESRD on PD, reports last few sessions he has been taking more fluid off and normal.  Asked to day prolonged, patient reports she was barbecuing and became dizzy. He sat down and then noticed left-sided weakness. Patient called out to his family and they noticed he was having trouble with words. Degree severe, duration minutes to hours. No aggravating or relieving factors. No headache, fevers chills, change in medications. Patient attempted to get up and walk after rest, and his left side \"gave out and felt heavy\". He was aided by family and EMT was called. Patient has history of type 1 diabetes and his blood glucose was above 100s. Patient was brought to the emergency room where CT head showed no acute intracranial abnormality. CTA head and neck showed severe stenosis of left vertebral artery, 50% stenosis of right vertebral artery, 50% stenosis of left ICA, and patent right carotid vascular stent without flow-limiting stenosis. He was admitted to the hospital for medical management. Today patient reports he is feeling much better. He denies headache, dizziness, dysarthria or dysphagia.   Patient does have a
Nephrology Consult Note  [phone]   [facility]. [facility]        Reason for Consult:  ESKD on PD    HISTORY OF PRESENT ILLNESS:                This is a patient with significant past medical history of ESRD on PD, KELLY s/p SUZANNA stent, CAD/CABG/CHF DM II, HPL, PVD, CVA, TIA seizures  presented with left sided. Pt reports he felt LH/dizzy then was noted to be altered with slurred speech per wife. BS was normal.  States used 2.5% dianeal solution x 2 days, UF almost 3L over 48hrs. States weight had been trending higher, denies edema or SOB. Pt reports h/o vomiting, no diarrhea, denies fever/chills/SOB/CP. Symptoms have resolved, felt like prior CVA. No issues with PD, effluent has been clear, denies abdominal pain. Has been on oral antibiotics for left foot infection. MRI is pending, neurology consulted.   -started on IVF -BP stable    Past Medical History:        Diagnosis Date    Angina at rest [facility])     Cardiomyopathy ([address])     Carotid artery stenosis 10/25/2016    SUZANNA stented with 8 x 30 mm non-tapered Xact stent    CHF (congestive heart failure) ([address]) 3/3/15    EF 30%     CKD (chronic kidney disease) stage 2, GFR 60-89 ml/min     Coronary artery disease     sp CABG UC    Diabetic peripheral neuropathy (HCC)     Diastolic HF (heart failure) (HCC)     Hyperlipidemia with target LDL less than 70     Hypertension goal BP (blood pressure) < 130/80     PVD (peripheral vascular disease) (HCC)     Seizures (HCC)     Type 1 diabetes mellitus with chronic kidney disease (HCC)     c-peptide <0.1 in 2015       Past Surgical History:        Procedure Laterality Date    CARDIAC CATHETERIZATION      CARDIAC SURGERY      triple bypass    CAROTID STENT PLACEMENT Right     CORONARY ARTERY BYPASS GRAFT      HERNIA REPAIR      LAPAROSCOPY INSERTION PERITONEAL CATHETER N/A 6/3/2020    LAPAROSCOPIC PLACEMENT OF PERITONEAL DIALYSIS  CATHETER performed by Akin Love MD at [facility]
Podiatric surgery consult note        CHIEF COMPLAINT:  \"  Chief Complaint   Patient presents with    Aphasia     Came by [facility] from home- wife noticed slurred speech and confusion- LNW 7861- Stroke alert called out in field- A&O x4    \"    Reason for Admission:  TIA (transient ischemic attack) [K66.2]  Acute metabolic encephalopathy [S76.24]    History Obtained From:  patient    HISTORY OF PRESENT ILLNESS:      The patient is a 66 y.o. male with significant past medical history of Listed below who presents with diabetic foot ulceration. He was admitted to the hospital for altered mental status. He also had the wound. He states that he has had this wound for weeks and has been treated by his podiatrist.  He denies current nausea, vomiting, fever, chills, shortness of breath or chest pain. He admits that he broke his foot 3 times causing a deformity.     Past Medical History:        Diagnosis Date    Angina at rest [facility])     Cardiomyopathy ([address])     Carotid artery stenosis 10/25/2016    SUZANNA stented with 8 x 30 mm non-tapered Xact stent    CHF (congestive heart failure) ([address]) 3/3/15    EF 30%     CKD (chronic kidney disease) stage 2, GFR 60-89 ml/min     Coronary artery disease     sp CABG UC    Diabetic peripheral neuropathy (HCC)     Diastolic HF (heart failure) ([facility])     Hyperlipidemia with target LDL less than 70     Hypertension goal BP (blood pressure) < 130/80     PVD (peripheral vascular disease) (HCC)     Seizures (HCC)     Type 1 diabetes mellitus with chronic kidney disease ([facility])     c-peptide <0.1 in 2015     Past Surgical History:        Procedure Laterality Date    CARDIAC CATHETERIZATION      CARDIAC SURGERY      triple bypass    CAROTID STENT PLACEMENT Right     CORONARY ARTERY BYPASS GRAFT      HERNIA REPAIR      LAPAROSCOPY INSERTION PERITONEAL CATHETER N/A 6/3/2020    LAPAROSCOPIC PLACEMENT OF PERITONEAL DIALYSIS  CATHETER performed by Kb Parada MD at [facility]
consultation.         Keshia Davis MD    D: 07/05/2023 14:38:23       T: 07/05/2023 14:47:32     [email or web address]  Job#: [id number]     Doc#: [id number]    CC:
me in the care of your patient. I will continue to follow. If you have any additional questions, please do not hesitate to contact me. Subjective:     Presenting complaint in ER:     Chief Complaint   Patient presents with    Aphasia     Came by [facility] from home- wife noticed slurred speech and confusion- LNW 1615- Stroke alert called out in field- A&O x4         HPI: Bettie Reid is a 66 y.o. male patient, who was seen at the request of Dr. Mary Grace Mireles MD.    History was obtained from chart review and the patient. The patient was admitted on 7/4/2023. I have been consulted to see the patient for above mentioned reason(s). The patient has multiple medical comorbidities, and presented to the ER for slurring of speech and confusion and strokelike symptoms. This is an ESRD patient on hemodialysis. The patient has longstanding uncontrolled type 2 diabetes mellitus. The patient is diabetic polyneuropathy and has been struggling with left foot ulceration for past several weeks. Primary team is concerned for left diabetic foot infection    I have been asked for my opinion for management for this patient. Past Medical History: All past medical history reviewed today.     Past Medical History:   Diagnosis Date    Angina at rest [facility])     Cardiomyopathy ([address] )     Carotid artery stenosis 10/25/2016    SUZANNA stented with 8 x 30 mm non-tapered Xact stent    CHF (congestive heart failure) ([address]) 3/3/15    EF 30%     CKD (chronic kidney disease) stage 2, GFR 60-89 ml/min     Coronary artery disease     sp CABG UC    Diabetic peripheral neuropathy (HCC)     Diastolic HF (heart failure) (HCC)     Hyperlipidemia with target LDL less than 70     Hypertension goal BP (blood pressure) < 130/80     PVD (peripheral vascular disease) (HCC)     Seizures (HCC)     Type 1 diabetes mellitus with chronic kidney disease (HCC)     c-peptide <0.1 in 2015         Past Surgical History: All pastsurgical

## 2023-07-06 NOTE — CARE COORDINATION
Discharge Planning Note:    Chart reviewed and pt has possible IV medication needs upon discharge. SW/SMOOTH contacted Selena with [employer] requesting home infusion benefits check. Selena to report back with benefit allowances.         Electronically signed by ROBIN Miller on 7/6/2023 at 2:28 PM

## 2023-07-07 VITALS
HEART RATE: 72 BPM | WEIGHT: 211.8 LBS | OXYGEN SATURATION: 95 % | BODY MASS INDEX: 32.1 KG/M2 | HEIGHT: 68 IN | DIASTOLIC BLOOD PRESSURE: 72 MMHG | SYSTOLIC BLOOD PRESSURE: 106 MMHG | TEMPERATURE: 97.6 F | RESPIRATION RATE: 18 BRPM

## 2023-07-07 DIAGNOSIS — I48.91 ATRIAL FIBRILLATION, UNSPECIFIED TYPE (HCC): Primary | ICD-10-CM

## 2023-07-07 LAB
ANION GAP SERPL CALCULATED.3IONS-SCNC: 15 MMOL/L (ref 3–16)
BACTERIA SPEC AEROBE CULT: ABNORMAL
BACTERIA SPEC AEROBE CULT: ABNORMAL
BUN SERPL-MCNC: 64 MG/DL (ref 7–20)
CALCIUM SERPL-MCNC: 8.1 MG/DL (ref 8.3–10.6)
CHLORIDE SERPL-SCNC: 95 MMOL/L (ref 99–110)
CO2 SERPL-SCNC: 23 MMOL/L (ref 21–32)
CREAT SERPL-MCNC: 9.7 MG/DL (ref 0.9–1.3)
DEPRECATED RDW RBC AUTO: 15.6 % (ref 12.4–15.4)
GFR SERPLBLD CREATININE-BSD FMLA CKD-EPI: 6 ML/MIN/{1.73_M2}
GLUCOSE BLD-MCNC: 228 MG/DL (ref 70–99)
GLUCOSE BLD-MCNC: 265 MG/DL (ref 70–99)
GLUCOSE BLD-MCNC: 268 MG/DL (ref 70–99)
GLUCOSE SERPL-MCNC: 345 MG/DL (ref 70–99)
GRAM STN SPEC: ABNORMAL
HCT VFR BLD AUTO: 52.4 % (ref 40.5–52.5)
HGB BLD-MCNC: 16.8 G/DL (ref 13.5–17.5)
MCH RBC QN AUTO: 28.7 PG (ref 26–34)
MCHC RBC AUTO-ENTMCNC: 32 G/DL (ref 31–36)
MCV RBC AUTO: 89.8 FL (ref 80–100)
ORGANISM: ABNORMAL
PERFORMED ON: ABNORMAL
PLATELET # BLD AUTO: 168 K/UL (ref 135–450)
PMV BLD AUTO: 11.2 FL (ref 5–10.5)
POTASSIUM SERPL-SCNC: 4.5 MMOL/L (ref 3.5–5.1)
RBC # BLD AUTO: 5.84 M/UL (ref 4.2–5.9)
SODIUM SERPL-SCNC: 133 MMOL/L (ref 136–145)
WBC # BLD AUTO: 6.8 K/UL (ref 4–11)

## 2023-07-07 PROCEDURE — 6370000000 HC RX 637 (ALT 250 FOR IP): Performed by: INTERNAL MEDICINE

## 2023-07-07 PROCEDURE — 6360000002 HC RX W HCPCS: Performed by: INTERNAL MEDICINE

## 2023-07-07 PROCEDURE — 80048 BASIC METABOLIC PNL TOTAL CA: CPT

## 2023-07-07 PROCEDURE — 2580000003 HC RX 258: Performed by: INTERNAL MEDICINE

## 2023-07-07 PROCEDURE — 99233 SBSQ HOSP IP/OBS HIGH 50: CPT | Performed by: INTERNAL MEDICINE

## 2023-07-07 PROCEDURE — 36415 COLL VENOUS BLD VENIPUNCTURE: CPT

## 2023-07-07 PROCEDURE — 85027 COMPLETE CBC AUTOMATED: CPT

## 2023-07-07 RX ORDER — NIFEDIPINE 30 MG/1
30 TABLET, FILM COATED, EXTENDED RELEASE ORAL DAILY
Qty: 30 TABLET | Refills: 3 | Status: SHIPPED | OUTPATIENT
Start: 2023-07-08

## 2023-07-07 RX ORDER — CIPROFLOXACIN 500 MG/1
500 TABLET, FILM COATED ORAL
Qty: 14 TABLET | Refills: 0 | Status: SHIPPED | OUTPATIENT
Start: 2023-07-08 | End: 2023-07-22

## 2023-07-07 RX ORDER — CIPROFLOXACIN 500 MG/1
500 TABLET, FILM COATED ORAL
Status: DISCONTINUED | OUTPATIENT
Start: 2023-07-07 | End: 2023-07-07 | Stop reason: HOSPADM

## 2023-07-07 RX ORDER — CALCITRIOL 0.25 UG/1
0.25 CAPSULE, LIQUID FILLED ORAL DAILY
Status: DISCONTINUED | OUTPATIENT
Start: 2023-07-07 | End: 2023-07-07 | Stop reason: HOSPADM

## 2023-07-07 RX ADMIN — GENTAMICIN SULFATE: 1 CREAM TOPICAL at 08:59

## 2023-07-07 RX ADMIN — NYSTATIN 500000 UNITS: 100000 SUSPENSION ORAL at 04:18

## 2023-07-07 RX ADMIN — ISOSORBIDE MONONITRATE 30 MG: 30 TABLET, EXTENDED RELEASE ORAL at 09:00

## 2023-07-07 RX ADMIN — CLOPIDOGREL BISULFATE 75 MG: 75 TABLET ORAL at 09:01

## 2023-07-07 RX ADMIN — Medication 1 CAPSULE: at 09:01

## 2023-07-07 RX ADMIN — CARVEDILOL 25 MG: 25 TABLET, FILM COATED ORAL at 08:59

## 2023-07-07 RX ADMIN — NIFEDIPINE 30 MG: 30 TABLET, EXTENDED RELEASE ORAL at 09:17

## 2023-07-07 RX ADMIN — HYDRALAZINE HYDROCHLORIDE 100 MG: 100 TABLET, FILM COATED ORAL at 09:00

## 2023-07-07 RX ADMIN — ONDANSETRON 4 MG: 4 TABLET, ORALLY DISINTEGRATING ORAL at 11:51

## 2023-07-07 RX ADMIN — RANOLAZINE 500 MG: 500 TABLET, EXTENDED RELEASE ORAL at 09:00

## 2023-07-07 RX ADMIN — ONDANSETRON 4 MG: 2 INJECTION INTRAMUSCULAR; INTRAVENOUS at 04:18

## 2023-07-07 RX ADMIN — CALCITRIOL CAPSULES 0.25 MCG 0.25 MCG: 0.25 CAPSULE ORAL at 11:26

## 2023-07-07 RX ADMIN — SACUBITRIL AND VALSARTAN 1 TABLET: 49; 51 TABLET, FILM COATED ORAL at 09:00

## 2023-07-07 RX ADMIN — HEPARIN SODIUM 5000 UNITS: 5000 INJECTION INTRAVENOUS; SUBCUTANEOUS at 04:19

## 2023-07-07 RX ADMIN — SODIUM CHLORIDE, PRESERVATIVE FREE 10 ML: 5 INJECTION INTRAVENOUS at 08:59

## 2023-07-07 RX ADMIN — POLYETHYLENE GLYCOL 3350 17 G: 17 POWDER, FOR SOLUTION ORAL at 04:18

## 2023-07-07 RX ADMIN — CIPROFLOXACIN 500 MG: 500 TABLET, FILM COATED ORAL at 13:23

## 2023-07-07 RX ADMIN — NYSTATIN 500000 UNITS: 100000 SUSPENSION ORAL at 08:59

## 2023-07-07 RX ADMIN — ASPIRIN 81 MG: 81 TABLET, COATED ORAL at 09:00

## 2023-07-07 RX ADMIN — CEFEPIME 1000 MG: 1 INJECTION, POWDER, FOR SOLUTION INTRAMUSCULAR; INTRAVENOUS at 12:34

## 2023-07-07 ASSESSMENT — PAIN DESCRIPTION - ORIENTATION: ORIENTATION: LEFT

## 2023-07-07 ASSESSMENT — ENCOUNTER SYMPTOMS
SORE THROAT: 0
EYE DISCHARGE: 0
SHORTNESS OF BREATH: 0
CONSTIPATION: 0
RHINORRHEA: 0
WHEEZING: 0
SINUS PRESSURE: 0
NAUSEA: 0
COUGH: 0
EYE REDNESS: 0
BACK PAIN: 0
DIARRHEA: 0
ABDOMINAL PAIN: 0
SINUS PAIN: 0

## 2023-07-07 ASSESSMENT — PAIN SCALES - GENERAL: PAINLEVEL_OUTOF10: 2

## 2023-07-07 ASSESSMENT — PAIN DESCRIPTION - DESCRIPTORS: DESCRIPTORS: ACHING

## 2023-07-07 ASSESSMENT — PAIN DESCRIPTION - LOCATION: LOCATION: FOOT

## 2023-07-07 NOTE — DISCHARGE INSTR - COC
Independent  Dressing  Independent  Toileting  Independent  Feeding  Independent  Med Admin  Independent  Med Delivery   whole    Wound Care Documentation and Therapy:  Incision 06/03/20 Abdomen Medial;Upper (Active)   Number of days: 1129        Elimination:  Continence: Bowel: Yes  Bladder: Yes  Urinary Catheter: None   Colostomy/Ileostomy/Ileal Conduit: No       Date of Last BM: 7/7/23    Intake/Output Summary (Last 24 hours) at 7/7/2023 1527  Last data filed at 7/7/2023 0645  Gross per 24 hour   Intake --   Output 1458 ml   Net -1458 ml     I/O last 3 completed shifts: In: 44   Out: [address]     Safety Concerns: At Risk for Falls    Impairments/Disabilities:      Diabetic neuropathy    Nutrition Therapy:  Current Nutrition Therapy:   - Oral Diet:  Carb Control 3 carbs/meal (1500kcals/day)    Routes of Feeding: Oral  Liquids: Thin Liquids  Daily Fluid Restriction: no  Last Modified Barium Swallow with Video (Video Swallowing Test): not done    Treatments at the Time of Hospital Discharge:   Respiratory Treatments: None  Oxygen Therapy:  is not on home oxygen therapy. Ventilator:    - No ventilator support    Rehab Therapies: Physical Therapy and Occupational Therapy  Weight Bearing Status/Restrictions: No weight bearing restrictions  Other Medical Equipment (for information only, NOT a DME order):  None  Other Treatments: none    Patient's personal belongings (please select all that are sent with patient):   With patient    RN SIGNATURE:  Electronically signed by Sara Snyder RN on 7/7/23 at 3:41 PM EDT    CASE MANAGEMENT/SOCIAL WORK SECTION    Inpatient Status Date: 07/04/2023    Readmission Risk Assessment Score: 17%  Readmission Risk              Risk of Unplanned Readmission:  24           Discharging to [facility]  [facility] Ayesha Salvador, [address]  Phone: [phone]  Fax: [phone]      / signature: Electronically signed by

## 2023-07-07 NOTE — CARE COORDINATION
Patient has an order for [facility] to care for his wound. Pt informed and is agreeable to d/c POC. Pt choose [facility]. CM called Ruddy Cash  [phone] and confirmed service for [facility]. Pt informed.     Electronically signed by Lashon Trinidad RN on 7/7/2023 at 3:52 PM

## 2023-07-07 NOTE — CARE COORDINATION
07/07/23 1542   IMM Letter   IMM Letter given to Patient/Family/Significant other/Guardian/POA/by: Letter given to Meliza Ordoñez by Tabitha Roberts RN_CM   IMM Letter date given: 07/07/23   IMM Letter time given: 1543     Electronically signed by Renaldo Trejo RN on 7/7/2023 at 3:43 PM

## 2023-07-07 NOTE — CARE COORDINATION
APN did active listening, mirroring of emotions and provided emotional support and encouragement to patient and his wife.    Refill Rx given for hydrocodone/apap 10-/325 mg tab, one tab q 4 hr PRN pain, # 120 tab.  Rx given for lidocaine 2.5% + prilocaine 2.5% cream, 30 grams - apply to port 1 hr prior to procedure (previous tube of medication ).    f/u with PCP and medical specialists as directed.       Case Management Assessment  Initial Evaluation    Date/Time of Evaluation: 7/7/2023 4:02 PM  Assessment Completed by: ROBIN Patel    If patient is discharged prior to next notation, then this note serves as note for discharge by case management. Patient Name: Simon Noble                   YOB: 1965  Diagnosis: TIA (transient ischemic attack) [X25.1]  Acute metabolic encephalopathy [X04.85]                   Date / Time: 7/4/2023  5:15 PM    Patient Admission Status: Inpatient   Readmission Risk (Low < 19, Mod (19-27), High > 27): Readmission Risk Score: 16.9    Current PCP: Alton Huerta MD  PCP verified by CM? Yes    Chart Reviewed: Yes      History Provided by: Patient  Patient Orientation: Alert and Oriented    Patient Cognition: Alert    Hospitalization in the last 30 days (Readmission):  No    If yes, Readmission Assessment in CM Navigator will be completed. Advance Directives:      Code Status: Full Code   Patient's Primary Decision Maker is: Named in Scanned ACP Document      Discharge Planning:    Patient lives with: Spouse/Significant Other Type of Home: House  Primary Care Giver: Self  Patient Support Systems include: Spouse/Significant Other, Family Members   Current Financial resources: Medicare  Current community resources: None  Current services prior to admission: None            Current DME:              Type of Home Care services:  Nursing Services, OT, PT    ADLS  Prior functional level: Independent in ADLs/IADLs  Current functional level: Assistance with the following:, Mobility    PT AM-PAC: 20 /24  OT AM-PAC: 23 /24    Family can provide assistance at DC: Yes  Would you like Case Management to discuss the discharge plan with any other family members/significant others, and if so, who?  Yes (Wife)  Plans to Return to Present Housing: Yes  Other Identified Issues/Barriers to RETURNING to current housing: Yes  Potential Assistance needed at discharge: [address] [place] [address]

## 2023-07-07 NOTE — PLAN OF CARE
Problem: Discharge Planning  Goal: Discharge to home or other facility with appropriate resources  7/7/2023 1618 by Mickey Vasquez RN  Outcome: Adequate for Discharge     Problem: Skin/Tissue Integrity  Goal: Absence of new skin breakdown  Description: 1. Monitor for areas of redness and/or skin breakdown  2. Assess vascular access sites hourly  3. Every 4-6 hours minimum:  Change oxygen saturation probe site  4. Every 4-6 hours:  If on nasal continuous positive airway pressure, respiratory therapy assess nares and determine need for appliance change or resting period.   7/7/2023 1618 by Mickey Vasquez RN  Outcome: Adequate for Discharge     Problem: ABCDS Injury Assessment  Goal: Absence of physical injury  7/7/2023 1618 by Mickey Vasquez RN  Outcome: Adequate for Discharge     Problem: Safety - Adult  Goal: Free from fall injury  7/7/2023 1618 by Mickey Vasquez RN  Outcome: Adequate for Discharge     Problem: Pain  Goal: Verbalizes/displays adequate comfort level or baseline comfort level  7/7/2023 1618 by Mickey Vasquez RN  Outcome: Adequate for Discharge     Problem: Chronic Conditions and Co-morbidities  Goal: Patient's chronic conditions and co-morbidity symptoms are monitored and maintained or improved  7/7/2023 1618 by Mickey Vasquez RN  Outcome: Adequate for Discharge

## 2023-07-07 NOTE — CARE COORDINATION
Case Management -  Discharge Note      Patient Name: Candy Mitchell                   YOB: 1965            Readmission Risk (Low < 19, Mod (19-27), High > 27): Readmission Risk Score: 16.9    Current PCP: Eddie Malcolm MD    ([facility]) Important Message from Medicare:    Date: 07/07/2023    PT AM-PAC: 21 /24  OT AM-PAC: 23 /24    Home Care Information:   Is patient resuming current home health care services: No    [address]:   [facility]  [address] [facility] [place]ie [place], [address]  Phone: [phone]  Fax: [phone]              Services: PT/OT/Nursing  Home Health Order Obtained: Yes    Home health agency notified of discharge. Financial    Payor: [facility] / Plan: [id number] / Product Type: *No Product type* /     Pharmacy:  Potential assistance Purchasing Medications: No  Meds-to-Beds request:        Analilia Noble [address] [place] [address] ([address] [phone]  [place] [address] [place] [address]  Phone: [phone] Fax: [phone]      Notes: Additional Case Management Notes: All case management needs met.        Electronically signed by ROBIN Myles on 7/7/2023 at 4:05 PM

## 2023-07-07 NOTE — DISCHARGE SUMMARY
median sternotomy. The lung apices are clear. No cervical or superior mediastinal lymphadenopathy. The larynx and pharynx are unremarkable. No acute abnormality of the salivary glands. There are thyroid nodules measuring up to 1.7 cm at the isthmus. BONES: No acute osseous abnormality. CTA HEAD: ANTERIOR CIRCULATION: There is 40% stenosis in the cavernous segments of the bilateral internal carotid arteries. No significant stenosis of the anterior cerebral, or middle cerebral arteries. No aneurysm. POSTERIOR CIRCULATION: There is right dominance of the vertebral arteries with hypoplastic intracranial left vertebral artery. No significant stenosis of the vertebral, basilar, or posterior cerebral arteries. No aneurysm. OTHER: No dural venous sinus thrombosis on this non-dedicated study. BRAIN: No mass effect or midline shift. No extra-axial fluid collection. The gray-white differentiation is maintained. Right dominance of the vertebral arteries with hypoplastic left vertebral artery. Severe stenosis at the junction of V3 and V4 segments of the left vertebral artery. Patent vascular stent at right carotid bulb, without flow-limiting stenosis. 50% stenosis at the origin of the left internal carotid artery. 40% stenosis in the cavernous segments of the bilateral internal carotid arteries. 50% stenosis in the proximal V1 segment of the right vertebral artery. RECOMMENDATIONS: Pathology: 1.7 cm incidental thyroid nodule. Recommend thyroid US. Reference: J Am Adan Radiol.  2015 Feb;12(2): 143-50     MRI brain without contrast    Result Date: 7/6/2023  EXAMINATION: MRI OF THE BRAIN WITHOUT CONTRAST  7/6/2023 7:43 am TECHNIQUE: Multiplanar multisequence MRI of the brain was performed without the administration of intravenous contrast. COMPARISON: CT/CT angiogram brain 7/4/2023 and MRI brain 3/2/2021 HISTORY: ORDERING SYSTEM PROVIDED HISTORY: AMS TECHNOLOGIST PROVIDED HISTORY: Reason for exam:->AMS Reason for Exam: AMS

## 2023-07-10 ENCOUNTER — CLINICAL DOCUMENTATION ONLY (OUTPATIENT)
Facility: CLINIC | Age: 58
End: 2023-07-10

## 2023-07-10 NOTE — TELEPHONE ENCOUNTER
Submitted PA for [employer]  [employer] Key: [id number] STATUS: PENDING. Follow up done daily; if no response in three days we will refax for status check. If another three days goes by with no response we will call the insurance for status.

## 2023-07-12 NOTE — TELEPHONE ENCOUNTER
Shannon Delgado Key: [id number] - PA Case ID: [id number] - Rx #: [id number]  Outcome  Approved on July 10  Request Reference Number: [id number]. DEXCOM G6 MIS SENSOR is approved through 07/10/2024. Your patient may now fill this prescription and it will be covered.

## 2023-07-20 ENCOUNTER — TELEPHONE (OUTPATIENT)
Dept: CARDIOLOGY CLINIC | Age: 58
End: 2023-07-20

## 2023-07-20 NOTE — TELEPHONE ENCOUNTER
Approvedtoday  Request Reference Number: [id number]. ENTRESTO TAB 49-51MG is approved through 07/20/2024.

## 2023-07-20 NOTE — TELEPHONE ENCOUNTER
Submitted PA for ENTRESTO  [facility]  Key: [id number] STATUS: PENDING. Follow up done daily; if no response in three days we will refax for status check. If another three days goes by with no response we will call the insurance for status.

## 2023-07-24 NOTE — DISCHARGE INSTRUCTIONS
[address], [address] [facility]  Telephone: [phone]    Discharge Instructions    Important reminders:    **If you have any signs and symptoms of illness (Cough, fever, congestion, nausea, vomiting, diarrhea, etc.) please call the wound care center prior to your appointment. 1. Increase Protein intake for optimal wound healing  2. No added salt to reduce any swelling  3. If diabetic, maintain good glucose control  4. If you smoke, smoking prohibits wound healing, we ask that you refrain from smoking. 5. When taking antibiotics take the entire prescription as ordered. Do not stop taking until medication is all gone unless otherwise instructed. 6. Exercise as tolerated. 7. Keep weight off wounds and reposition every 2 hours if applicable. 8. If wound(s) is on your lower extremity, elevate legs to the level of the heart or above for 30 minutes 4-5 times a day and/or when sitting. Avoid standing for long periods of time. 9. Do not get wounds wet in bath or shower unless otherwise instructed by your physician. If your wound is on your foot or leg, you may purchase a cast bag. Please ask at the pharmacy. If Vascular testing is ordered, please call [address] [place] [address] [phone]) to schedule. Vascular tests ordered by Wound Care Physicians may take up to 2 hours to complete. Please keep that in mind when scheduling. If Vascular testing is scheduled, please bring supplies to replace your dressing after testing is done. The vascular department does not stock supplies. Wound:  Left Foot    With each dressing change, rinse wounds with 0.9% Saline. (May use wound wash or soft contact solution. Both can be purchased at a local drug store). If unable to obtain saline, may use a gentle soap and water. Dressing care: Mupirocin, Collagen (cut to size of wound). Dry dressing. Change dressing daily    Watch diet.  No potatoes, Rice, Bread Cereal or

## 2023-07-26 ENCOUNTER — HOSPITAL ENCOUNTER (OUTPATIENT)
Dept: WOUND CARE | Age: 58
Discharge: HOME | End: 2023-07-26
Payer: MEDICARE

## 2023-07-26 ENCOUNTER — TELEPHONE (OUTPATIENT)
Dept: SURGERY | Age: 58
End: 2023-07-26

## 2023-07-26 VITALS
SYSTOLIC BLOOD PRESSURE: 139 MMHG | HEART RATE: 93 BPM | DIASTOLIC BLOOD PRESSURE: 90 MMHG | RESPIRATION RATE: 16 BRPM | TEMPERATURE: 96.8 F

## 2023-07-26 DIAGNOSIS — E11.621 DIABETIC ULCER OF LEFT MIDFOOT ASSOCIATED WITH TYPE 2 DIABETES MELLITUS, WITH MUSCLE INVOLVEMENT WITHOUT EVIDENCE OF NECROSIS (HCC): Primary | ICD-10-CM

## 2023-07-26 DIAGNOSIS — L97.425 DIABETIC ULCER OF LEFT MIDFOOT ASSOCIATED WITH TYPE 2 DIABETES MELLITUS, WITH MUSCLE INVOLVEMENT WITHOUT EVIDENCE OF NECROSIS (HCC): Primary | ICD-10-CM

## 2023-07-26 PROCEDURE — 99213 OFFICE O/P EST LOW 20 MIN: CPT

## 2023-07-26 PROCEDURE — 11042 DBRDMT SUBQ TIS 1ST 20SQCM/<: CPT

## 2023-07-26 ASSESSMENT — PAIN DESCRIPTION - DESCRIPTORS: DESCRIPTORS: SHARP;THROBBING

## 2023-07-26 ASSESSMENT — PAIN DESCRIPTION - ONSET: ONSET: ON-GOING

## 2023-07-26 ASSESSMENT — PAIN - FUNCTIONAL ASSESSMENT: PAIN_FUNCTIONAL_ASSESSMENT: PREVENTS OR INTERFERES SOME ACTIVE ACTIVITIES AND ADLS

## 2023-07-26 ASSESSMENT — PAIN DESCRIPTION - LOCATION: LOCATION: FOOT

## 2023-07-26 ASSESSMENT — PAIN DESCRIPTION - PAIN TYPE: TYPE: CHRONIC PAIN

## 2023-07-26 ASSESSMENT — PAIN SCALES - GENERAL: PAINLEVEL_OUTOF10: 6

## 2023-07-26 ASSESSMENT — PAIN DESCRIPTION - ORIENTATION: ORIENTATION: LEFT

## 2023-07-26 ASSESSMENT — PAIN DESCRIPTION - FREQUENCY: FREQUENCY: INTERMITTENT

## 2023-07-26 NOTE — PLAN OF CARE
Discharge instructions given. Patient verbalized understanding. Return to [address] [place] [address] in 1 week(s).   Called/faxed orders to  [facility]
to obtain saline, may use a gentle soap and water. Dressing care: Mupirocin, Collagen (cut to size of wound). Dry dressing. Change dressing daily    Watch diet. No potatoes, Rice, Bread Cereal or Pasta. Watch all carbs. Limit to 15g carbs per meal or 45g carbs per day. Limit fruit to blackberries, blueberries and raspberries. High protein. Vegetables are good. Keep sugars under 150    Dr Russ Hinkle, Vascular Surgeon - [facility] for [facility]  [address], [address]  Phone# [phone]  Fax# [phone]        Home Care Agency/Facility:     Your wound-care supplies will be provided by: [employer]  Please note, depending on your insurance coverage, you may have out-of-pocket expenses for these supplies. Someone from the company should call you to confirm your order and discuss those potential costs before they ship your products -- please anticipate that call. If your out-of-pocket cost could be substantial, Many companies have financial hardship programs for patients who qualify, so please ask about that if you might need a hand. If you have any questions about your supplies or your potential out-of-pocket costs, or if you need to place an order for a refill of supplies (typically monthly), please call the company directly. Your  is Vitor Galindo     Follow up with Dr Juanjose Smith In 1 week(s) in the wound care center. Wound Care Center Information: Should you experience any significant changes in your wound(s) or have questions about your wound care, please contact the [address] [place] [address] at [phone] Monday  - Thursday 8:00 am - 4:00 pm and Friday 8:00 am - 1:00pm. If you need help with your wound outside these hours and cannot wait until we are again available, contact your PCP or go to the hospital emergency room. PLEASE NOTE: IF YOU ARE UNABLE TO OBTAIN WOUND SUPPLIES, CONTINUE TO USE THE SUPPLIES YOU HAVE AVAILABLE UNTIL YOU ARE ABLE TO REACH US.  IT IS MOST IMPORTANT TO

## 2023-07-26 NOTE — PROGRESS NOTES
Current Outpatient Medications:     NIFEdipine (ADALAT CC) 30 MG extended release tablet, Take 1 tablet by mouth daily, Disp: 30 tablet, Rfl: 3    sacubitril-valsartan (ENTRESTO) 49-51 MG per tablet, Take 1 tablet by mouth 2 times daily, Disp: 60 tablet, Rfl: 3    Continuous Blood Gluc Sensor (DEXCOM G6 SENSOR) MISC, USE AS DIRECTED CHANGE EVERY 10 DAYS, Disp: 3 each, Rfl: 3    Continuous Blood Gluc Transmit (DEXCOM G6 TRANSMITTER) MISC, Change every 90 days, Disp: 1 each, Rfl: 1    insulin aspart (NOVOLOG) 100 UNIT/ML injection vial, 30-40 units daily via pump, Disp: 20 mL, Rfl: 3    insulin lispro (HUMALOG) 100 UNIT/ML injection vial, 30-40 units daily via pump, Disp: 20 mL, Rfl: 3    ranolazine (RANEXA) 500 MG extended release tablet, TAKE ONE TABLET BY MOUTH TWICE A DAY, Disp: 60 tablet, Rfl: 5    nitroGLYCERIN (NITROSTAT) 0.4 MG SL tablet, DISSOLVE 1 TAB UNDER TONGUE FOR CHEST PAIN - IF PAIN REMAINS AFTER 5 MIN, CALL 911 AND REPEAT DOSE.  MAX 3 TABS IN 15 MINUTES, Disp: 25 tablet, Rfl: 2    carvedilol (COREG) 25 MG tablet, TAKE ONE TABLET BY MOUTH TWICE A DAY, Disp: 180 tablet, Rfl: 1    clopidogrel (PLAVIX) 75 MG tablet, Take 1 tablet by mouth daily, Disp: 90 tablet, Rfl: 3    metoclopramide (REGLAN) 5 MG tablet, Take 1 tablet by mouth 4 times daily (with meals and nightly), Disp: 120 tablet, Rfl: 1    ezetimibe (ZETIA) 10 MG tablet, TAKE ONE TABLET BY MOUTH DAILY, Disp: 30 tablet, Rfl: 0    calcitRIOL (ROCALTROL) 0.25 MCG capsule, Take 2 capsules by mouth daily 2 tabs M through F nothing sat and sun, Disp: , Rfl:     furosemide (LASIX) 80 MG tablet, Take 1 tablet by mouth 2 times daily, Disp: , Rfl:     hydrALAZINE (APRESOLINE) 100 MG tablet, TAKE ONE TABLET BY MOUTH THREE TIMES A DAY (Patient taking differently: 2 times daily), Disp: 90 tablet, Rfl: 1    isosorbide mononitrate (IMDUR) 30 MG extended release tablet, Take 1 tablet by mouth daily, Disp: , Rfl:     aspirin 81 MG EC tablet, Take 1 tablet by

## 2023-07-28 RX ORDER — HYDRALAZINE HYDROCHLORIDE 100 MG/1
TABLET, FILM COATED ORAL
Qty: 90 TABLET | Refills: 1 | Status: SHIPPED | OUTPATIENT
Start: 2023-07-28

## 2023-07-28 NOTE — TELEPHONE ENCOUNTER
Last OV: 23 NPTS  Next OV: 23 NPTS  Last labs: BMP 23  Last EK23  Last filled:     Disp Refills Start End     hydrALAZINE (APRESOLINE) 100 MG tablet 90 tablet 1 2022     Sig: TAKE ONE TABLET BY MOUTH THREE TIMES A DAY    Patient taking differently: 2 times daily        Sent to pharmacy as: hydrALAZINE HCl 100 MG Oral Tablet (APRESOLINE)    E-Prescribing Status: Receipt confirmed by pharmacy (2022  4:10 PM EDT)

## 2023-07-31 RX ORDER — HYDRALAZINE HYDROCHLORIDE 100 MG/1
TABLET, FILM COATED ORAL
Qty: 270 TABLET | Refills: 1 | OUTPATIENT
Start: 2023-07-31

## 2023-07-31 NOTE — TELEPHONE ENCOUNTER
Received refill request for hydrALAZINE (APRESOLINE) 100 MG tablet from [facility] pharmacy.      Last OV: 6-8-2023 NPTS    Next OV: 8-3-2023 MXA    Last Labs: 7-7-2023 BMP    Last Filled:  7- NPTS

## 2023-08-12 RX ORDER — CLOPIDOGREL BISULFATE 75 MG/1
75 TABLET ORAL DAILY
Qty: 90 TABLET | Refills: 2 | Status: SHIPPED | OUTPATIENT
Start: 2023-08-12

## 2023-08-13 RX ORDER — ISOSORBIDE MONONITRATE 30 MG/1
30 TABLET, EXTENDED RELEASE ORAL DAILY
Qty: 90 TABLET | Refills: 2 | Status: SHIPPED | OUTPATIENT
Start: 2023-08-13 | End: 2023-09-21 | Stop reason: SDUPTHER

## 2023-08-16 ENCOUNTER — HOSPITAL ENCOUNTER (INPATIENT)
Age: 58
LOS: 1 days | Discharge: HOME | End: 2023-08-18
Attending: EMERGENCY MEDICINE | Admitting: SURGERY
Payer: MEDICARE

## 2023-08-16 ENCOUNTER — APPOINTMENT (OUTPATIENT)
Dept: CT IMAGING | Age: 58
End: 2023-08-16
Payer: MEDICARE

## 2023-08-16 DIAGNOSIS — R10.9 ACUTE LEFT FLANK PAIN: Primary | ICD-10-CM

## 2023-08-16 DIAGNOSIS — N13.30 HYDROURETERONEPHROSIS: ICD-10-CM

## 2023-08-16 DIAGNOSIS — N18.6 ESRD ON PERITONEAL DIALYSIS (HCC): ICD-10-CM

## 2023-08-16 DIAGNOSIS — Z99.2 ESRD ON PERITONEAL DIALYSIS (HCC): ICD-10-CM

## 2023-08-16 PROCEDURE — 96361 HYDRATE IV INFUSION ADD-ON: CPT

## 2023-08-16 PROCEDURE — 99285 EMERGENCY DEPT VISIT HI MDM: CPT

## 2023-08-16 PROCEDURE — 74176 CT ABD & PELVIS W/O CONTRAST: CPT

## 2023-08-16 PROCEDURE — 96374 THER/PROPH/DIAG INJ IV PUSH: CPT

## 2023-08-16 PROCEDURE — 96376 TX/PRO/DX INJ SAME DRUG ADON: CPT

## 2023-08-16 PROCEDURE — 96375 TX/PRO/DX INJ NEW DRUG ADDON: CPT

## 2023-08-16 RX ORDER — ONDANSETRON 2 MG/ML
4 INJECTION INTRAMUSCULAR; INTRAVENOUS
Status: DISCONTINUED | OUTPATIENT
Start: 2023-08-16 | End: 2023-08-17 | Stop reason: HOSPADM

## 2023-08-16 RX ORDER — HYDROMORPHONE HYDROCHLORIDE 1 MG/ML
1 INJECTION, SOLUTION INTRAMUSCULAR; INTRAVENOUS; SUBCUTANEOUS
Status: COMPLETED | OUTPATIENT
Start: 2023-08-16 | End: 2023-08-17

## 2023-08-16 RX ORDER — 0.9 % SODIUM CHLORIDE 0.9 %
1000 INTRAVENOUS SOLUTION INTRAVENOUS ONCE
Status: COMPLETED | OUTPATIENT
Start: 2023-08-16 | End: 2023-08-17

## 2023-08-16 ASSESSMENT — PAIN - FUNCTIONAL ASSESSMENT: PAIN_FUNCTIONAL_ASSESSMENT: 0-10

## 2023-08-17 PROBLEM — N20.0 KIDNEY STONE: Status: ACTIVE | Noted: 2023-08-17

## 2023-08-17 LAB
ALBUMIN SERPL-MCNC: 4.2 G/DL (ref 3.4–5)
ALBUMIN/GLOB SERPL: 1.4 {RATIO} (ref 1.1–2.2)
ALP SERPL-CCNC: 128 U/L (ref 40–129)
ALT SERPL-CCNC: 13 U/L (ref 10–40)
ANION GAP SERPL CALCULATED.3IONS-SCNC: 15 MMOL/L (ref 3–16)
AST SERPL-CCNC: 22 U/L (ref 15–37)
BACTERIA URNS QL MICRO: ABNORMAL /HPF
BASOPHILS # BLD: 0.4 K/UL (ref 0–0.2)
BASOPHILS NFR BLD: 6 %
BILIRUB SERPL-MCNC: 0.5 MG/DL (ref 0–1)
BILIRUB UR QL STRIP.AUTO: NEGATIVE
BUN SERPL-MCNC: 55 MG/DL (ref 7–20)
CALCIUM SERPL-MCNC: 9.6 MG/DL (ref 8.3–10.6)
CHLORIDE SERPL-SCNC: 94 MMOL/L (ref 99–110)
CLARITY UR: CLEAR
CO2 SERPL-SCNC: 24 MMOL/L (ref 21–32)
COLOR UR: YELLOW
CREAT SERPL-MCNC: 7 MG/DL (ref 0.9–1.3)
DEPRECATED RDW RBC AUTO: 14.9 % (ref 12.4–15.4)
EOSINOPHIL # BLD: 0.2 K/UL (ref 0–0.6)
EOSINOPHIL NFR BLD: 2.9 %
EPI CELLS #/AREA URNS AUTO: 0 /HPF (ref 0–5)
GFR SERPLBLD CREATININE-BSD FMLA CKD-EPI: 9 ML/MIN/{1.73_M2}
GLUCOSE BLD-MCNC: 167 MG/DL (ref 70–99)
GLUCOSE BLD-MCNC: 205 MG/DL (ref 70–99)
GLUCOSE BLD-MCNC: 83 MG/DL (ref 70–99)
GLUCOSE SERPL-MCNC: 297 MG/DL (ref 70–99)
GLUCOSE UR STRIP.AUTO-MCNC: 500 MG/DL
HCT VFR BLD AUTO: 46.6 % (ref 40.5–52.5)
HGB BLD-MCNC: 15 G/DL (ref 13.5–17.5)
HGB UR QL STRIP.AUTO: ABNORMAL
HYALINE CASTS #/AREA URNS AUTO: 0 /LPF (ref 0–8)
INR PPP: 0.96 (ref 0.84–1.16)
KETONES UR STRIP.AUTO-MCNC: NEGATIVE MG/DL
LACTATE BLDV-SCNC: 1.8 MMOL/L (ref 0.4–1.9)
LEUKOCYTE ESTERASE UR QL STRIP.AUTO: ABNORMAL
LIPASE SERPL-CCNC: 31 U/L (ref 13–60)
LYMPHOCYTES # BLD: 0.7 K/UL (ref 1–5.1)
LYMPHOCYTES NFR BLD: 9.4 %
MCH RBC QN AUTO: 28.8 PG (ref 26–34)
MCHC RBC AUTO-ENTMCNC: 32.1 G/DL (ref 31–36)
MCV RBC AUTO: 89.7 FL (ref 80–100)
MONOCYTES # BLD: 0.9 K/UL (ref 0–1.3)
MONOCYTES NFR BLD: 12.3 %
NEUTROPHILS # BLD: 5 K/UL (ref 1.7–7.7)
NEUTROPHILS NFR BLD: 69.4 %
NITRITE UR QL STRIP.AUTO: NEGATIVE
PERFORMED ON: ABNORMAL
PERFORMED ON: ABNORMAL
PERFORMED ON: NORMAL
PH UR STRIP.AUTO: 5.5 [PH] (ref 5–8)
PLATELET # BLD AUTO: 159 K/UL (ref 135–450)
PMV BLD AUTO: 10.9 FL (ref 5–10.5)
POTASSIUM SERPL-SCNC: 4.9 MMOL/L (ref 3.5–5.1)
PROT SERPL-MCNC: 7.1 G/DL (ref 6.4–8.2)
PROT UR STRIP.AUTO-MCNC: 100 MG/DL
PROTHROMBIN TIME: 12.8 SEC (ref 11.5–14.8)
RBC # BLD AUTO: 5.2 M/UL (ref 4.2–5.9)
RBC CLUMPS #/AREA URNS AUTO: 12 /HPF (ref 0–4)
SODIUM SERPL-SCNC: 133 MMOL/L (ref 136–145)
SP GR UR STRIP.AUTO: 1.01 (ref 1–1.03)
UA COMPLETE W REFLEX CULTURE PNL UR: ABNORMAL
UA DIPSTICK W REFLEX MICRO PNL UR: YES
URN SPEC COLLECT METH UR: ABNORMAL
UROBILINOGEN UR STRIP-ACNC: 0.2 E.U./DL
WBC # BLD AUTO: 7.3 K/UL (ref 4–11)
WBC #/AREA URNS AUTO: 3 /HPF (ref 0–5)

## 2023-08-17 PROCEDURE — 1200000000 HC SEMI PRIVATE

## 2023-08-17 PROCEDURE — 6360000002 HC RX W HCPCS: Performed by: EMERGENCY MEDICINE

## 2023-08-17 PROCEDURE — 6360000002 HC RX W HCPCS: Performed by: SURGERY

## 2023-08-17 PROCEDURE — 83605 ASSAY OF LACTIC ACID: CPT

## 2023-08-17 PROCEDURE — 6370000000 HC RX 637 (ALT 250 FOR IP): Performed by: SURGERY

## 2023-08-17 PROCEDURE — 85025 COMPLETE CBC W/AUTO DIFF WBC: CPT

## 2023-08-17 PROCEDURE — 83690 ASSAY OF LIPASE: CPT

## 2023-08-17 PROCEDURE — 2580000003 HC RX 258: Performed by: SURGERY

## 2023-08-17 PROCEDURE — 2580000003 HC RX 258: Performed by: EMERGENCY MEDICINE

## 2023-08-17 PROCEDURE — 3E1M39Z IRRIGATION OF PERITONEAL CAVITY USING DIALYSATE, PERCUTANEOUS APPROACH: ICD-10-PCS | Performed by: INTERNAL MEDICINE

## 2023-08-17 PROCEDURE — 90945 DIALYSIS ONE EVALUATION: CPT

## 2023-08-17 PROCEDURE — 80053 COMPREHEN METABOLIC PANEL: CPT

## 2023-08-17 PROCEDURE — 81001 URINALYSIS AUTO W/SCOPE: CPT

## 2023-08-17 PROCEDURE — 85610 PROTHROMBIN TIME: CPT

## 2023-08-17 RX ORDER — SODIUM CHLORIDE 9 MG/ML
INJECTION, SOLUTION INTRAVENOUS PRN
Status: DISCONTINUED | OUTPATIENT
Start: 2023-08-17 | End: 2023-08-18 | Stop reason: HOSPADM

## 2023-08-17 RX ORDER — SODIUM CHLORIDE 0.9 % (FLUSH) 0.9 %
5-40 SYRINGE (ML) INJECTION PRN
Status: DISCONTINUED | OUTPATIENT
Start: 2023-08-17 | End: 2023-08-18 | Stop reason: HOSPADM

## 2023-08-17 RX ORDER — FUROSEMIDE 40 MG/1
80 TABLET ORAL 2 TIMES DAILY
Status: DISCONTINUED | OUTPATIENT
Start: 2023-08-17 | End: 2023-08-18 | Stop reason: HOSPADM

## 2023-08-17 RX ORDER — HYDRALAZINE HYDROCHLORIDE 25 MG/1
100 TABLET, FILM COATED ORAL 3 TIMES DAILY
Status: DISCONTINUED | OUTPATIENT
Start: 2023-08-17 | End: 2023-08-18 | Stop reason: HOSPADM

## 2023-08-17 RX ORDER — ISOSORBIDE MONONITRATE 30 MG/1
30 TABLET, EXTENDED RELEASE ORAL DAILY
Status: DISCONTINUED | OUTPATIENT
Start: 2023-08-17 | End: 2023-08-18 | Stop reason: HOSPADM

## 2023-08-17 RX ORDER — HEPARIN SODIUM 5000 [USP'U]/ML
5000 INJECTION, SOLUTION INTRAVENOUS; SUBCUTANEOUS EVERY 8 HOURS SCHEDULED
Status: DISCONTINUED | OUTPATIENT
Start: 2023-08-17 | End: 2023-08-18 | Stop reason: HOSPADM

## 2023-08-17 RX ORDER — SODIUM CHLORIDE 9 MG/ML
INJECTION, SOLUTION INTRAVENOUS CONTINUOUS
Status: DISCONTINUED | OUTPATIENT
Start: 2023-08-17 | End: 2023-08-17

## 2023-08-17 RX ORDER — NIFEDIPINE 30 MG/1
30 TABLET, FILM COATED, EXTENDED RELEASE ORAL DAILY
Status: DISCONTINUED | OUTPATIENT
Start: 2023-08-17 | End: 2023-08-18 | Stop reason: HOSPADM

## 2023-08-17 RX ORDER — TAMSULOSIN HYDROCHLORIDE 0.4 MG/1
0.4 CAPSULE ORAL DAILY
Status: DISCONTINUED | OUTPATIENT
Start: 2023-08-17 | End: 2023-08-18 | Stop reason: HOSPADM

## 2023-08-17 RX ORDER — CLOPIDOGREL BISULFATE 75 MG/1
75 TABLET ORAL DAILY
Status: DISCONTINUED | OUTPATIENT
Start: 2023-08-17 | End: 2023-08-18 | Stop reason: HOSPADM

## 2023-08-17 RX ORDER — ROSUVASTATIN CALCIUM 40 MG/1
40 TABLET, COATED ORAL NIGHTLY
Status: DISCONTINUED | OUTPATIENT
Start: 2023-08-17 | End: 2023-08-18 | Stop reason: HOSPADM

## 2023-08-17 RX ORDER — CALCITRIOL 0.25 UG/1
0.5 CAPSULE, LIQUID FILLED ORAL DAILY
Status: DISCONTINUED | OUTPATIENT
Start: 2023-08-17 | End: 2023-08-18 | Stop reason: HOSPADM

## 2023-08-17 RX ORDER — POLYETHYLENE GLYCOL 3350 17 G/17G
17 POWDER, FOR SOLUTION ORAL DAILY PRN
Status: DISCONTINUED | OUTPATIENT
Start: 2023-08-17 | End: 2023-08-18 | Stop reason: HOSPADM

## 2023-08-17 RX ORDER — SODIUM CHLORIDE 0.9 % (FLUSH) 0.9 %
5-40 SYRINGE (ML) INJECTION EVERY 12 HOURS SCHEDULED
Status: DISCONTINUED | OUTPATIENT
Start: 2023-08-17 | End: 2023-08-18 | Stop reason: HOSPADM

## 2023-08-17 RX ORDER — CARVEDILOL 25 MG/1
25 TABLET ORAL 2 TIMES DAILY WITH MEALS
Status: DISCONTINUED | OUTPATIENT
Start: 2023-08-17 | End: 2023-08-18 | Stop reason: HOSPADM

## 2023-08-17 RX ORDER — ASPIRIN 81 MG/1
81 TABLET ORAL DAILY
Status: DISCONTINUED | OUTPATIENT
Start: 2023-08-17 | End: 2023-08-18 | Stop reason: HOSPADM

## 2023-08-17 RX ORDER — EZETIMIBE 10 MG/1
10 TABLET ORAL DAILY
Status: DISCONTINUED | OUTPATIENT
Start: 2023-08-17 | End: 2023-08-17

## 2023-08-17 RX ORDER — RANOLAZINE 500 MG/1
500 TABLET, EXTENDED RELEASE ORAL 2 TIMES DAILY
Status: DISCONTINUED | OUTPATIENT
Start: 2023-08-17 | End: 2023-08-18 | Stop reason: HOSPADM

## 2023-08-17 RX ORDER — HYDROMORPHONE HYDROCHLORIDE 1 MG/ML
0.5 INJECTION, SOLUTION INTRAMUSCULAR; INTRAVENOUS; SUBCUTANEOUS
Status: DISCONTINUED | OUTPATIENT
Start: 2023-08-17 | End: 2023-08-18 | Stop reason: HOSPADM

## 2023-08-17 RX ADMIN — RANOLAZINE 500 MG: 500 TABLET, FILM COATED, EXTENDED RELEASE ORAL at 08:40

## 2023-08-17 RX ADMIN — ISOSORBIDE MONONITRATE 30 MG: 30 TABLET, EXTENDED RELEASE ORAL at 08:41

## 2023-08-17 RX ADMIN — CALCITRIOL CAPSULES 0.25 MCG 0.5 MCG: 0.25 CAPSULE ORAL at 08:41

## 2023-08-17 RX ADMIN — ONDANSETRON 4 MG: 2 INJECTION INTRAMUSCULAR; INTRAVENOUS at 00:14

## 2023-08-17 RX ADMIN — HYDRALAZINE HYDROCHLORIDE 100 MG: 25 TABLET, FILM COATED ORAL at 21:16

## 2023-08-17 RX ADMIN — CEFTRIAXONE SODIUM 1000 MG: 1 INJECTION, POWDER, FOR SOLUTION INTRAMUSCULAR; INTRAVENOUS at 05:42

## 2023-08-17 RX ADMIN — HEPARIN SODIUM 5000 UNITS: 5000 INJECTION INTRAVENOUS; SUBCUTANEOUS at 21:16

## 2023-08-17 RX ADMIN — HYDROMORPHONE HYDROCHLORIDE 1 MG: 1 INJECTION, SOLUTION INTRAMUSCULAR; INTRAVENOUS; SUBCUTANEOUS at 01:08

## 2023-08-17 RX ADMIN — ROSUVASTATIN CALCIUM 40 MG: 40 TABLET, COATED ORAL at 21:16

## 2023-08-17 RX ADMIN — RANOLAZINE 500 MG: 500 TABLET, FILM COATED, EXTENDED RELEASE ORAL at 21:16

## 2023-08-17 RX ADMIN — TAMSULOSIN HYDROCHLORIDE 0.4 MG: 0.4 CAPSULE ORAL at 08:41

## 2023-08-17 RX ADMIN — HEPARIN SODIUM 5000 UNITS: 5000 INJECTION INTRAVENOUS; SUBCUTANEOUS at 12:53

## 2023-08-17 RX ADMIN — SODIUM CHLORIDE: 9 INJECTION, SOLUTION INTRAVENOUS at 22:18

## 2023-08-17 RX ADMIN — NIFEDIPINE 30 MG: 30 TABLET, EXTENDED RELEASE ORAL at 08:46

## 2023-08-17 RX ADMIN — HYDROMORPHONE HYDROCHLORIDE 0.5 MG: 1 INJECTION, SOLUTION INTRAMUSCULAR; INTRAVENOUS; SUBCUTANEOUS at 08:39

## 2023-08-17 RX ADMIN — HEPARIN SODIUM 5000 UNITS: 5000 INJECTION INTRAVENOUS; SUBCUTANEOUS at 05:39

## 2023-08-17 RX ADMIN — SODIUM CHLORIDE 1000 ML: 9 INJECTION, SOLUTION INTRAVENOUS at 00:19

## 2023-08-17 RX ADMIN — SACUBITRIL AND VALSARTAN 1 TABLET: 49; 51 TABLET, FILM COATED ORAL at 21:16

## 2023-08-17 RX ADMIN — HYDRALAZINE HYDROCHLORIDE 100 MG: 25 TABLET, FILM COATED ORAL at 12:53

## 2023-08-17 RX ADMIN — FUROSEMIDE 80 MG: 40 TABLET ORAL at 08:40

## 2023-08-17 RX ADMIN — HYDROMORPHONE HYDROCHLORIDE 1 MG: 1 INJECTION, SOLUTION INTRAMUSCULAR; INTRAVENOUS; SUBCUTANEOUS at 00:14

## 2023-08-17 RX ADMIN — HYDRALAZINE HYDROCHLORIDE 100 MG: 25 TABLET, FILM COATED ORAL at 08:40

## 2023-08-17 RX ADMIN — CARVEDILOL 25 MG: 25 TABLET, FILM COATED ORAL at 08:41

## 2023-08-17 RX ADMIN — CARVEDILOL 25 MG: 25 TABLET, FILM COATED ORAL at 17:14

## 2023-08-17 RX ADMIN — SODIUM CHLORIDE: 9 INJECTION, SOLUTION INTRAVENOUS at 05:30

## 2023-08-17 RX ADMIN — SACUBITRIL AND VALSARTAN 1 TABLET: 49; 51 TABLET, FILM COATED ORAL at 08:40

## 2023-08-17 RX ADMIN — FUROSEMIDE 80 MG: 40 TABLET ORAL at 17:14

## 2023-08-17 ASSESSMENT — PAIN SCALES - GENERAL
PAINLEVEL_OUTOF10: 5
PAINLEVEL_OUTOF10: 9
PAINLEVEL_OUTOF10: 3
PAINLEVEL_OUTOF10: 8

## 2023-08-17 ASSESSMENT — PAIN DESCRIPTION - PAIN TYPE
TYPE: ACUTE PAIN
TYPE: ACUTE PAIN

## 2023-08-17 ASSESSMENT — PAIN DESCRIPTION - ORIENTATION
ORIENTATION: LEFT
ORIENTATION: LEFT

## 2023-08-17 ASSESSMENT — PAIN - FUNCTIONAL ASSESSMENT: PAIN_FUNCTIONAL_ASSESSMENT: PREVENTS OR INTERFERES SOME ACTIVE ACTIVITIES AND ADLS

## 2023-08-17 ASSESSMENT — PAIN DESCRIPTION - DESCRIPTORS
DESCRIPTORS: THROBBING
DESCRIPTORS: THROBBING;SQUEEZING

## 2023-08-17 ASSESSMENT — PAIN DESCRIPTION - FREQUENCY
FREQUENCY: INTERMITTENT
FREQUENCY: CONTINUOUS

## 2023-08-17 ASSESSMENT — PAIN DESCRIPTION - LOCATION
LOCATION: BACK
LOCATION: BACK

## 2023-08-17 ASSESSMENT — PAIN DESCRIPTION - ONSET
ONSET: ON-GOING
ONSET: PROGRESSIVE

## 2023-08-17 NOTE — CARE COORDINATION
Discharge Planning:     (CM) reviewed the patient's chart to assess needs. Patient's Readmission Risk Score is 18%. Patient's medical insurance is Medicare and [address] [facility]. Patient's PCP is Dr. Quinn Ferrell. No needs anticipated, at this time. CM team to follow. Staff to inform CM if additional discharge needs arise.         Marquise KELLY, TERRENCE, [facility] -   [phone]    Electronically signed by ROBIN Pruitt on 8/17/2023 at 8:43 AM

## 2023-08-17 NOTE — CONSULTS
Urology Consult Note  [id number] [address]     Patient: Lalitha Paz MRN: [id number]  Room/Bed: [address] [place] [address]   YOB: 1965  Age/Sex: 66 y.o.male  Admission Date: 8/16/2023     Date of Service:  8/17/2023    Consulting Provider: Alina Mistry PA-C  Admitting/Requesting Physician: Arvin Marshall MD  Primary Care Physician: Nolvia Arana MD    Reason for Consult: Right hydronephrosis    ASSESSMENT/PLAN     67 yo male hx ESRD on HD admitted with left flank pain. CT shows mild left hydronephrosis and proximal hydroureter indicating possible recently passed stone vs stricture. Cr 7 today which appears baseline   No leukocytosis, no fevers, UA doesn't appear infected     Recommendations:  -No emergent  intervention at this time. Patient's pain is improving, urine doesn't appear infected and he is afebrile. Hydro likely secondary to recently passed stone vs stricture. Can get a renal ultrasound today and will continue to monitor.   -Will request outpatient follow up to address hydronephrosis. Consider repeat AC in 6 weeks outpatient vs diagnostic uretero outpatient if hydro persists.   -Will follow please call urology with any questions    All patient questions were answered. He understands the plan as listed above. HISTORY     Chief Complaint:   Chief Complaint   Patient presents with    Back Pain     Pt. States back pain for a few days but worse for about 2 hours. Pt. Get PD and treatment was fine. Pt. States it feels like kidney pain        History of Present Illness: Lalitha Paz is a 66 y.o. male with left hydronephrosis. Onset of symptoms was days ago with improving course since that time. Symptoms are aggravated by possible recently passed stone. Symptoms improved with pain control. Associated symptoms include nausea a few days ago now resolved. Patient also reports mild left flank pain that appears to be improving.  He has tried the following treatments: fluids, pain effusion. Organs: Lack of IV contrast reduces evaluation of the organs and vasculature. No obvious mass or nodularity at the unenhanced liver. Gallbladder is distended without mineralized stone. Spleen is not enlarged. There is no pancreatic calcification, ductal dilatation, or fluid collection. The adrenal glands are unremarkable. Atrophic right kidney with a combination of nonobstructing calculi and arterial calcifications. Small hypodensities along the cortex are too small to characterize. There is no right-sided hydronephrosis, hydroureter, or ureteral calculus. Left kidney also has cortical atrophy. There is a 3.5 cm cyst in the interpolar region a fluid attenuation which was also present on the previous study. However there is mild dilatation of the left renal calices and proximal hydroureter. Ureter gradually tapers in the lower abdomen and pelvis without a focal calculus identified. GI/Bowel: Moderate amounts of fluid in the stomach. Mild thickening of the distal esophagus could be a mild esophagitis. Small bowel and colon are not dilated. Mild fecal load in the colon and rectum but without obstruction or large focal bolus. The appendix is identified and no appendicitis. Small amount of free fluid are present but also has the peritoneal dialysis catheter coiling in the pelvis. No significant pneumoperitoneum at this time. Pelvis: The urinary bladder is under distended with a borderline thickness of the wall. Prostate is not enlarged. Small amount of free fluid in the pelvis with the peritoneal dialysis. Peritoneum/Retroperitoneum: Atherosclerotic calcification of the aorta and branches but there is no abdominal aortic aneurysm or retroperitoneal hematoma. Bones/Soft Tissues: No acute fracture or dislocation at the pelvis and hips. Mild degenerative changes the spine and pelvis. There is a new oval lucent lesion in the inferior endplate of L3 without collapse of the vertebral body.  No

## 2023-08-17 NOTE — CONSULTS
[facility]   Nephrology progress Note  [phone]   [facility]. [facility]           Reason for Consult:  ESRD    HISTORY OF PRESENT ILLNESS:      The patient is a 66 y.o. male with significant past medical history of ESRD on dialysis, coronary artery disease s/p CABG, carotid artery stenosis s/p stent right internal carotid artery, ischemic cardiomyopathy, hypertension, hyperlipidemia, nephrolithiasis  T1 diabetes diagnosed when he was 7years old, who presents to the hospital with back pain, was found to have left hydronephrosis. He has been seen by urologist Dr. Salas Reveal  He says his PD effluent's been clear and he has no fever chills        Past Medical History:        Diagnosis Date    Angina at rest [facility])     Cardiomyopathy ([address])     Carotid artery stenosis 10/25/2016    SUZANNA stented with 8 x 30 mm non-tapered Xact stent    CHF (congestive heart failure) ([address]) 3/3/15    EF 30%     CKD (chronic kidney disease) stage 2, GFR 60-89 ml/min     Coronary artery disease     sp CABG UC    Diabetic peripheral neuropathy ([facility])     Diastolic HF (heart failure) ([facility])     Hyperlipidemia with target LDL less than 70     Hypertension goal BP (blood pressure) < 130/80     PVD (peripheral vascular disease) ([facility])     Seizures ([address])     Type 1 diabetes mellitus with chronic kidney disease ([address])     c-peptide <0.1 in 2015       Past Surgical History:        Procedure Laterality Date    CARDIAC CATHETERIZATION      CARDIAC SURGERY      triple bypass    CAROTID STENT PLACEMENT Right     CORONARY ARTERY BYPASS GRAFT      HERNIA REPAIR      LAPAROSCOPY INSERTION PERITONEAL CATHETER N/A 6/3/2020    LAPAROSCOPIC PLACEMENT OF PERITONEAL DIALYSIS  CATHETER performed by Maisha Del Rio MD at [facility]         Current Medications:    No current facility-administered medications on file prior to encounter.      Current Outpatient Medications on File Prior to Encounter   Medication Sig Dispense

## 2023-08-17 NOTE — PROGRESS NOTES
CCPD Order   Exchanges: 4   Exchange Volume: 2500 ml   Total Time: 10 hrs   Dextrose: 1.5% and 2.5%   Total Volume: 10,000 ml     Orders verified. Supplies taken to pt's room. Report received. Cycler set up, primed and pre tested. Dressing changed on [facility] Cath site. Pt connected to cycler. CCPD initiated without problem. Initial effluent clear. If problems should arise please call the [phone] number on top of PD cycler machine.

## 2023-08-17 NOTE — ED NOTES
ED TO INPATIENT SBAR HANDOFF    Patient Name: Angela Cutler   :  1965  66 y.o. MRN:  [id number]  Preferred Name  [facility] [address] [facility]  ED Room #:  ED-0021/21  Family/Caregiver Present yes   Restraints no   Sitter no   Sepsis Risk Score Sepsis Risk Score: 3.41    Situation  Code Status: Prior No additional code details. Allergies: Iodides, Shellfish-derived products, and Atorvastatin calcium  Weight: Patient Vitals for the past 96 hrs (Last 3 readings):   Weight   23 2317 200 lb (90.7 kg)     Arrived from: home  Chief Complaint:   Chief Complaint   Patient presents with    Back Pain     Pt. States back pain for a few days but worse for about 2 hours. Pt. Get PD and treatment was fine. Pt. States it feels like kidney pain      Hospital Problem/Diagnosis:  Principal Problem:    Kidney stone  Resolved Problems:    * No resolved hospital problems. *    Imaging:   CT ABDOMEN PELVIS WO CONTRAST Additional Contrast? None   Final Result   1. There is mild left hydronephrosis and proximal hydroureter. However the   left ureter gradually tapers in the mid to lower abdomen without   identification of a calculus. Correlate for any recent passage of a stone   and correlate with urinalysis to exclude UTI. 2.  The cyst in the interpolar left kidney is stable. 3.  Peritoneal dialysis catheter with small volume of free fluid. There is   no significant pneumoperitoneum. 4.  Constipation the colon but no bowel obstruction. Distal esophagitis is   suspected. 5.  Small lytic lesion at the inferior endplate of L3 is new from the   previous exam.  However no diffuse lytic foci and of uncertain significance.            Abnormal labs:   Abnormal Labs Reviewed   CBC WITH AUTO DIFFERENTIAL - Abnormal; Notable for the following components:       Result Value    MPV 10.9 (*)     Lymphocytes Absolute 0.7 (*)     Basophils Absolute 0.4 (*)     All other components within normal limits   COMPREHENSIVE METABOLIC PANEL W/

## 2023-08-17 NOTE — PROGRESS NOTES
4 Eyes Skin Assessment     NAME:  Manju Lopez  YOB: 1965  MEDICAL RECORD NUMBER:  [id number]    The patient is being assessed for  Admission    I agree that at least one RN has performed a thorough Head to Toe Skin Assessment on the patient. ALL assessment sites listed below have been assessed. Areas assessed by both nurses:    Head, Face, Ears, Shoulders, Back, Chest, Arms, Elbows, Hands, Sacrum. Buttock, Coccyx, Ischium, Legs. Feet and Heels, and Under Medical Devices         Does the Patient have a Wound? Yes wound(s) were present on assessment.  LDA wound assessment was Initiated and completed by RN       Paul Prevention initiated by RN: Yes  Wound Care Orders initiated by RN: No    Pressure Injury (Stage 3,4, Unstageable, DTI, NWPT, and Complex wounds) if present, place Wound referral order by RN under : No    New Ostomies, if present place, Ostomy referral order under : No     Nurse 1 eSignature: Electronically signed by Nhung Pavon RN on 8/17/23 at 6:38 AM EDT    **SHARE this note so that the co-signing nurse can place an eSignature**    Nurse 2 eSignature: Electronically signed by Jennifer Perez RN on 8/17/23 at 6:37 AM EDT

## 2023-08-18 ENCOUNTER — APPOINTMENT (OUTPATIENT)
Dept: ULTRASOUND IMAGING | Age: 58
End: 2023-08-18
Payer: MEDICARE

## 2023-08-18 VITALS
BODY MASS INDEX: 31.42 KG/M2 | SYSTOLIC BLOOD PRESSURE: 111 MMHG | TEMPERATURE: 98.1 F | RESPIRATION RATE: 16 BRPM | HEIGHT: 67 IN | OXYGEN SATURATION: 95 % | WEIGHT: 200.2 LBS | DIASTOLIC BLOOD PRESSURE: 64 MMHG | HEART RATE: 87 BPM

## 2023-08-18 LAB
ALBUMIN SERPL-MCNC: 3.4 G/DL (ref 3.4–5)
ANION GAP SERPL CALCULATED.3IONS-SCNC: 13 MMOL/L (ref 3–16)
BASOPHILS # BLD: 0 K/UL (ref 0–0.2)
BASOPHILS NFR BLD: 0.4 %
BUN SERPL-MCNC: 50 MG/DL (ref 7–20)
CALCIUM SERPL-MCNC: 8.7 MG/DL (ref 8.3–10.6)
CHLORIDE SERPL-SCNC: 102 MMOL/L (ref 99–110)
CO2 SERPL-SCNC: 23 MMOL/L (ref 21–32)
CREAT SERPL-MCNC: 7.1 MG/DL (ref 0.9–1.3)
DEPRECATED RDW RBC AUTO: 15.3 % (ref 12.4–15.4)
EOSINOPHIL # BLD: 0.2 K/UL (ref 0–0.6)
EOSINOPHIL NFR BLD: 4.2 %
GFR SERPLBLD CREATININE-BSD FMLA CKD-EPI: 8 ML/MIN/{1.73_M2}
GLUCOSE SERPL-MCNC: 155 MG/DL (ref 70–99)
HCT VFR BLD AUTO: 41.5 % (ref 40.5–52.5)
HGB BLD-MCNC: 13.2 G/DL (ref 13.5–17.5)
LYMPHOCYTES # BLD: 1.4 K/UL (ref 1–5.1)
LYMPHOCYTES NFR BLD: 35.1 %
MCH RBC QN AUTO: 28.6 PG (ref 26–34)
MCHC RBC AUTO-ENTMCNC: 31.9 G/DL (ref 31–36)
MCV RBC AUTO: 89.5 FL (ref 80–100)
MONOCYTES # BLD: 0.5 K/UL (ref 0–1.3)
MONOCYTES NFR BLD: 13.1 %
NEUTROPHILS # BLD: 1.9 K/UL (ref 1.7–7.7)
NEUTROPHILS NFR BLD: 47.2 %
PHOSPHATE SERPL-MCNC: 4.2 MG/DL (ref 2.5–4.9)
PLATELET # BLD AUTO: 137 K/UL (ref 135–450)
PMV BLD AUTO: 10.2 FL (ref 5–10.5)
POTASSIUM SERPL-SCNC: 3.6 MMOL/L (ref 3.5–5.1)
RBC # BLD AUTO: 4.63 M/UL (ref 4.2–5.9)
SODIUM SERPL-SCNC: 138 MMOL/L (ref 136–145)
WBC # BLD AUTO: 4 K/UL (ref 4–11)

## 2023-08-18 PROCEDURE — 6370000000 HC RX 637 (ALT 250 FOR IP): Performed by: SURGERY

## 2023-08-18 PROCEDURE — 2580000003 HC RX 258: Performed by: SURGERY

## 2023-08-18 PROCEDURE — 76770 US EXAM ABDO BACK WALL COMP: CPT

## 2023-08-18 PROCEDURE — 85025 COMPLETE CBC W/AUTO DIFF WBC: CPT

## 2023-08-18 PROCEDURE — 36415 COLL VENOUS BLD VENIPUNCTURE: CPT

## 2023-08-18 PROCEDURE — 6360000002 HC RX W HCPCS: Performed by: SURGERY

## 2023-08-18 PROCEDURE — 80069 RENAL FUNCTION PANEL: CPT

## 2023-08-18 RX ORDER — TAMSULOSIN HYDROCHLORIDE 0.4 MG/1
0.4 CAPSULE ORAL DAILY
Qty: 30 CAPSULE | Refills: 3 | Status: SHIPPED | OUTPATIENT
Start: 2023-08-19 | End: 2023-08-20

## 2023-08-18 RX ADMIN — FUROSEMIDE 80 MG: 40 TABLET ORAL at 09:13

## 2023-08-18 RX ADMIN — CARVEDILOL 25 MG: 25 TABLET, FILM COATED ORAL at 18:22

## 2023-08-18 RX ADMIN — HEPARIN SODIUM 5000 UNITS: 5000 INJECTION INTRAVENOUS; SUBCUTANEOUS at 05:25

## 2023-08-18 RX ADMIN — CARVEDILOL 25 MG: 25 TABLET, FILM COATED ORAL at 09:13

## 2023-08-18 RX ADMIN — CEFTRIAXONE SODIUM 1000 MG: 1 INJECTION, POWDER, FOR SOLUTION INTRAMUSCULAR; INTRAVENOUS at 05:29

## 2023-08-18 RX ADMIN — HYDRALAZINE HYDROCHLORIDE 100 MG: 25 TABLET, FILM COATED ORAL at 13:59

## 2023-08-18 RX ADMIN — ISOSORBIDE MONONITRATE 30 MG: 30 TABLET, EXTENDED RELEASE ORAL at 09:13

## 2023-08-18 RX ADMIN — HEPARIN SODIUM 5000 UNITS: 5000 INJECTION INTRAVENOUS; SUBCUTANEOUS at 13:58

## 2023-08-18 RX ADMIN — SACUBITRIL AND VALSARTAN 1 TABLET: 49; 51 TABLET, FILM COATED ORAL at 09:13

## 2023-08-18 RX ADMIN — ASPIRIN 81 MG: 81 TABLET, COATED ORAL at 09:13

## 2023-08-18 RX ADMIN — HYDRALAZINE HYDROCHLORIDE 100 MG: 25 TABLET, FILM COATED ORAL at 09:13

## 2023-08-18 RX ADMIN — NIFEDIPINE 30 MG: 30 TABLET, EXTENDED RELEASE ORAL at 13:58

## 2023-08-18 RX ADMIN — CALCITRIOL CAPSULES 0.25 MCG 0.5 MCG: 0.25 CAPSULE ORAL at 09:17

## 2023-08-18 RX ADMIN — SODIUM CHLORIDE, PRESERVATIVE FREE 10 ML: 5 INJECTION INTRAVENOUS at 09:14

## 2023-08-18 RX ADMIN — CLOPIDOGREL BISULFATE 75 MG: 75 TABLET ORAL at 09:13

## 2023-08-18 RX ADMIN — TAMSULOSIN HYDROCHLORIDE 0.4 MG: 0.4 CAPSULE ORAL at 09:13

## 2023-08-18 RX ADMIN — RANOLAZINE 500 MG: 500 TABLET, FILM COATED, EXTENDED RELEASE ORAL at 09:13

## 2023-08-18 RX ADMIN — FUROSEMIDE 80 MG: 40 TABLET ORAL at 18:22

## 2023-08-18 ASSESSMENT — PAIN SCALES - GENERAL
PAINLEVEL_OUTOF10: 0
PAINLEVEL_OUTOF10: 0
PAINLEVEL_OUTOF10: 1
PAINLEVEL_OUTOF10: 0

## 2023-08-18 ASSESSMENT — PAIN DESCRIPTION - LOCATION: LOCATION: BACK

## 2023-08-18 ASSESSMENT — PAIN DESCRIPTION - DESCRIPTORS: DESCRIPTORS: SPASM

## 2023-08-18 ASSESSMENT — PAIN DESCRIPTION - PAIN TYPE: TYPE: ACUTE PAIN

## 2023-08-18 ASSESSMENT — PAIN DESCRIPTION - ORIENTATION: ORIENTATION: LEFT

## 2023-08-18 NOTE — PLAN OF CARE
Problem: Pain  Goal: Verbalizes/displays adequate comfort level or baseline comfort level  8/18/2023 1138 by Memo Hidalgo RN  Outcome: Progressing  8/17/2023 2239 by Sherri Hernandes RN  Outcome: Progressing     Problem: Safety - Adult  Goal: Free from fall injury  8/18/2023 1138 by Memo Hidalgo RN  Outcome: Progressing  8/17/2023 2239 by Sherri Hernandes RN  Outcome: Progressing     Problem: ABCDS Injury Assessment  Goal: Absence of physical injury  8/18/2023 1138 by Memo Hidalgo RN  Outcome: Progressing  8/17/2023 2239 by Sherri Hernandes RN  Outcome: Progressing

## 2023-08-18 NOTE — DISCHARGE SUMMARY
Hospital Medicine Discharge Summary    Patient: Meliza Ordoñez     Gender: male  : 1965   Age: 66 y.o. MRN: [id number]    Admitting Physician: Gloria Goss MD  Discharge Physician: Korin Jerry MD     Code Status: Full Code     Admit Date: 2023   Discharge Date:   2023    Disposition:  Home  Time spent arranging discharge: 31 minutes    Discharge Diagnoses: Active Hospital Problems    Diagnosis Date Noted    Kidney stone [N20.0] 2023     Condition at Discharge:  [facility] Course:   Patient admitted to hospital with left flank pain with left hydronephrosis secondary to past numbers of stricture patient repeat ultrasound which was stable did not show any hydronephrosis cleared for discharge by urology with follow-up with urology as outpatient. Discharge Exam:  General appearance:  Appears comfortable. AAOx3  HEENT: atraumatic, Pupils equal, muscous membranes moist, no masses appreciated  Cardiovascular: Regular rate and rhythm no murmurs appreciated  Respiratory: CTAB no wheezing  Gastrointestinal: Abdomen soft, non-tender, BS+  EXT: no edema  Neurology: no gross focal deficts  Psychiatry: Appropriate affect.  Not agitated  Skin: Warm, dry, no rashes appreciated    Discharge Medications:   Current Discharge Medication List        START taking these medications    Details   tamsulosin (FLOMAX) 0.4 MG capsule Take 1 capsule by mouth daily  Qty: 30 capsule, Refills: 3           Current Discharge Medication List        Current Discharge Medication List        CONTINUE these medications which have NOT CHANGED    Details   isosorbide mononitrate (IMDUR) 30 MG extended release tablet TAKE 1 TABLET BY MOUTH DAILY  Qty: 90 tablet, Refills: 2      clopidogrel (PLAVIX) 75 MG tablet Take 1 tablet by mouth daily  Qty: 90 tablet, Refills: 2      hydrALAZINE (APRESOLINE) 100 MG tablet TAKE 1 TABLET BY MOUTH THREE TIMES DAILY  Qty: 90 tablet, Refills: 1      NIFEdipine (ADALAT CC) 30

## 2023-08-18 NOTE — PROGRESS NOTES
Shift assessment completed. VSS, scheduled meds given/held per MAR orders. Pt is A&Ox4. Pt has no complaints or needs at this time. Brakes locked, bed in lowest position, Pt is hooked up to his HD machine. All belongings and call light within reach.

## 2023-08-18 NOTE — CARE COORDINATION
[facility] Wound Ostomy Continence Nurse  Consult Note       NAME:  Darrick Trinidad  MEDICAL RECORD NUMBER:  [id number]  AGE: 66 y.o.    GENDER: male  : 1965  TODAY'S DATE:  2023    Subjective   Reason for WOCN Evaluation and Assessment: left foot wound       Darrick Trinidad is a 66 y.o. male referred by:   [x] Physician  [x] Nursing  [] Other:     Wound Identification:  Wound Type: diabetic  Contributing Factors: diabetes    Wound History: present on admission  Current Wound Care Treatment:  foam dressing     Patient Goal of Care:  [x] Wound Healing  [] Odor Control  [] Palliative Care  [] Pain Control   [] Other:         PAST MEDICAL HISTORY        Diagnosis Date    Angina at rest [facility])     Cardiomyopathy ([address])     Carotid artery stenosis 10/25/2016    SUZANNA stented with 8 x 30 mm non-tapered Xact stent    CHF (congestive heart failure) ([address]) 3/3/15    EF 30%     CKD (chronic kidney disease) stage 2, GFR 60-89 ml/min     Coronary artery disease     sp CABG UC    Diabetic peripheral neuropathy ([facility])     Diastolic HF (heart failure) ([facility])     Hyperlipidemia with target LDL less than 70     Hypertension goal BP (blood pressure) < 130/80     PVD (peripheral vascular disease) ([facility])     Seizures ([facility])     Type 1 diabetes mellitus with chronic kidney disease ([address])     c-peptide <0.1 in        PAST SURGICAL HISTORY    Past Surgical History:   Procedure Laterality Date    CARDIAC CATHETERIZATION      CARDIAC SURGERY      triple bypass    CAROTID STENT PLACEMENT Right     CORONARY ARTERY BYPASS GRAFT      HERNIA REPAIR      LAPAROSCOPY INSERTION PERITONEAL CATHETER N/A 6/3/2020    LAPAROSCOPIC PLACEMENT OF PERITONEAL DIALYSIS  CATHETER performed by Zuleika Ramos MD at [address] [place] [address]    Family History   Problem Relation Age of Onset    Coronary Art Dis Father     Diabetes Father     Coronary Art Dis Paternal Grandmother     Diabetes Paternal Grandmother     Hypertension Mother

## 2023-08-18 NOTE — PROGRESS NOTES
Disconnected from CCPD per protocol. Effluent: Clear yellow  Total time: 10 hr 19 min   Total UF:  261 ml. Total Volume:  9993 ml. Dwell time gained:  0 hr 39 min. Pt Tolerated procedure: Pt tolerated well.

## 2023-08-18 NOTE — PROGRESS NOTES
IV removed, D/C instructions given. Pt verbalizes understanding and all questions answered.  Requested medications be sent to local pharmacy for p/u from M.D.

## 2023-08-18 NOTE — PLAN OF CARE
Problem: Discharge Planning  Goal: Discharge to home or other facility with appropriate resources  8/17/2023 2239 by Yo Benjamin RN  Outcome: Progressing     Problem: Pain  Goal: Verbalizes/displays adequate comfort level or baseline comfort level  8/17/2023 2239 by Yo Benjamin RN  Outcome: Progressing     Problem: Safety - Adult  Goal: Free from fall injury  8/17/2023 2239 by Yo Benjamin RN  Outcome: Progressing     Problem: ABCDS Injury Assessment  Goal: Absence of physical injury  Outcome: Progressing

## 2023-08-19 DIAGNOSIS — I25.83 CORONARY ARTERY DISEASE DUE TO LIPID RICH PLAQUE: ICD-10-CM

## 2023-08-19 DIAGNOSIS — I25.10 CORONARY ARTERY DISEASE DUE TO LIPID RICH PLAQUE: ICD-10-CM

## 2023-08-20 ENCOUNTER — TELEPHONE (OUTPATIENT)
Dept: OTHER | Facility: CLINIC | Age: 58
End: 2023-08-20

## 2023-08-20 ENCOUNTER — HOSPITAL ENCOUNTER (EMERGENCY)
Age: 58
Discharge: HOME | End: 2023-08-20
Attending: EMERGENCY MEDICINE
Payer: MEDICARE

## 2023-08-20 ENCOUNTER — APPOINTMENT (OUTPATIENT)
Dept: CT IMAGING | Age: 58
End: 2023-08-20
Payer: MEDICARE

## 2023-08-20 VITALS
OXYGEN SATURATION: 98 % | RESPIRATION RATE: 15 BRPM | TEMPERATURE: 98.1 F | SYSTOLIC BLOOD PRESSURE: 156 MMHG | DIASTOLIC BLOOD PRESSURE: 90 MMHG | HEART RATE: 93 BPM

## 2023-08-20 DIAGNOSIS — R10.9 LEFT FLANK PAIN: Primary | ICD-10-CM

## 2023-08-20 DIAGNOSIS — N20.0 KIDNEY STONE: ICD-10-CM

## 2023-08-20 DIAGNOSIS — R10.9 FLANK PAIN: ICD-10-CM

## 2023-08-20 LAB
ANION GAP SERPL CALCULATED.3IONS-SCNC: 16 MMOL/L (ref 3–16)
BACTERIA URNS QL MICRO: NORMAL /HPF
BASOPHILS # BLD: 0 K/UL (ref 0–0.2)
BASOPHILS NFR BLD: 0.5 %
BILIRUB UR QL STRIP.AUTO: NEGATIVE
BUN SERPL-MCNC: 58 MG/DL (ref 7–20)
CALCIUM SERPL-MCNC: 9.3 MG/DL (ref 8.3–10.6)
CHLORIDE SERPL-SCNC: 95 MMOL/L (ref 99–110)
CLARITY UR: CLEAR
CO2 SERPL-SCNC: 21 MMOL/L (ref 21–32)
COLOR UR: YELLOW
CREAT SERPL-MCNC: 8.2 MG/DL (ref 0.9–1.3)
DEPRECATED RDW RBC AUTO: 14.9 % (ref 12.4–15.4)
EOSINOPHIL # BLD: 0.1 K/UL (ref 0–0.6)
EOSINOPHIL NFR BLD: 1.5 %
EPI CELLS #/AREA URNS AUTO: 0 /HPF (ref 0–5)
GFR SERPLBLD CREATININE-BSD FMLA CKD-EPI: 7 ML/MIN/{1.73_M2}
GLUCOSE SERPL-MCNC: 311 MG/DL (ref 70–99)
GLUCOSE UR STRIP.AUTO-MCNC: 500 MG/DL
HCT VFR BLD AUTO: 46.8 % (ref 40.5–52.5)
HGB BLD-MCNC: 15.1 G/DL (ref 13.5–17.5)
HGB UR QL STRIP.AUTO: NEGATIVE
HYALINE CASTS #/AREA URNS AUTO: 0 /LPF (ref 0–8)
KETONES UR STRIP.AUTO-MCNC: ABNORMAL MG/DL
LEUKOCYTE ESTERASE UR QL STRIP.AUTO: NEGATIVE
LYMPHOCYTES # BLD: 1 K/UL (ref 1–5.1)
LYMPHOCYTES NFR BLD: 15 %
MCH RBC QN AUTO: 28.8 PG (ref 26–34)
MCHC RBC AUTO-ENTMCNC: 32.2 G/DL (ref 31–36)
MCV RBC AUTO: 89.3 FL (ref 80–100)
MONOCYTES # BLD: 0.7 K/UL (ref 0–1.3)
MONOCYTES NFR BLD: 10.8 %
NEUTROPHILS # BLD: 4.6 K/UL (ref 1.7–7.7)
NEUTROPHILS NFR BLD: 72.2 %
NITRITE UR QL STRIP.AUTO: NEGATIVE
PH UR STRIP.AUTO: 5.5 [PH] (ref 5–8)
PLATELET # BLD AUTO: 166 K/UL (ref 135–450)
PLATELET BLD QL SMEAR: ADEQUATE
PMV BLD AUTO: 10.8 FL (ref 5–10.5)
POTASSIUM SERPL-SCNC: 4.1 MMOL/L (ref 3.5–5.1)
PROT UR STRIP.AUTO-MCNC: 100 MG/DL
RBC # BLD AUTO: 5.24 M/UL (ref 4.2–5.9)
RBC CLUMPS #/AREA URNS AUTO: 1 /HPF (ref 0–4)
RBC MORPH BLD: NORMAL
SLIDE REVIEW: ABNORMAL
SODIUM SERPL-SCNC: 132 MMOL/L (ref 136–145)
SP GR UR STRIP.AUTO: 1.01 (ref 1–1.03)
UA DIPSTICK W REFLEX MICRO PNL UR: YES
URN SPEC COLLECT METH UR: ABNORMAL
UROBILINOGEN UR STRIP-ACNC: 0.2 E.U./DL
WBC # BLD AUTO: 6.4 K/UL (ref 4–11)
WBC #/AREA URNS AUTO: 1 /HPF (ref 0–5)

## 2023-08-20 PROCEDURE — 96375 TX/PRO/DX INJ NEW DRUG ADDON: CPT

## 2023-08-20 PROCEDURE — 85025 COMPLETE CBC W/AUTO DIFF WBC: CPT

## 2023-08-20 PROCEDURE — 96374 THER/PROPH/DIAG INJ IV PUSH: CPT

## 2023-08-20 PROCEDURE — 6360000002 HC RX W HCPCS: Performed by: EMERGENCY MEDICINE

## 2023-08-20 PROCEDURE — 99284 EMERGENCY DEPT VISIT MOD MDM: CPT

## 2023-08-20 PROCEDURE — 80048 BASIC METABOLIC PNL TOTAL CA: CPT

## 2023-08-20 PROCEDURE — 6370000000 HC RX 637 (ALT 250 FOR IP): Performed by: EMERGENCY MEDICINE

## 2023-08-20 PROCEDURE — 81001 URINALYSIS AUTO W/SCOPE: CPT

## 2023-08-20 PROCEDURE — 74176 CT ABD & PELVIS W/O CONTRAST: CPT

## 2023-08-20 RX ORDER — TAMSULOSIN HYDROCHLORIDE 0.4 MG/1
0.4 CAPSULE ORAL DAILY
Qty: 7 CAPSULE | Refills: 0 | Status: SHIPPED | OUTPATIENT
Start: 2023-08-20 | End: 2023-08-27

## 2023-08-20 RX ORDER — HYDROCODONE BITARTRATE AND ACETAMINOPHEN 5; 325 MG/1; MG/1
1 TABLET ORAL EVERY 6 HOURS PRN
Qty: 12 TABLET | Refills: 0 | Status: ON HOLD | OUTPATIENT
Start: 2023-08-20 | End: 2023-08-26 | Stop reason: HOSPADM

## 2023-08-20 RX ORDER — ONDANSETRON 2 MG/ML
4 INJECTION INTRAMUSCULAR; INTRAVENOUS ONCE
Status: COMPLETED | OUTPATIENT
Start: 2023-08-20 | End: 2023-08-20

## 2023-08-20 RX ORDER — HYDROCODONE BITARTRATE AND ACETAMINOPHEN 5; 325 MG/1; MG/1
1 TABLET ORAL ONCE
Status: COMPLETED | OUTPATIENT
Start: 2023-08-20 | End: 2023-08-20

## 2023-08-20 RX ORDER — TAMSULOSIN HYDROCHLORIDE 0.4 MG/1
0.4 CAPSULE ORAL ONCE
Status: COMPLETED | OUTPATIENT
Start: 2023-08-20 | End: 2023-08-20

## 2023-08-20 RX ORDER — HYDROMORPHONE HYDROCHLORIDE 1 MG/ML
0.5 INJECTION, SOLUTION INTRAMUSCULAR; INTRAVENOUS; SUBCUTANEOUS ONCE
Status: COMPLETED | OUTPATIENT
Start: 2023-08-20 | End: 2023-08-20

## 2023-08-20 RX ADMIN — TAMSULOSIN HYDROCHLORIDE 0.4 MG: 0.4 CAPSULE ORAL at 13:35

## 2023-08-20 RX ADMIN — ONDANSETRON 4 MG: 2 INJECTION INTRAMUSCULAR; INTRAVENOUS at 13:34

## 2023-08-20 RX ADMIN — HYDROCODONE BITARTRATE AND ACETAMINOPHEN 1 TABLET: 5; 325 TABLET ORAL at 15:20

## 2023-08-20 RX ADMIN — HYDROMORPHONE HYDROCHLORIDE 0.5 MG: 1 INJECTION, SOLUTION INTRAMUSCULAR; INTRAVENOUS; SUBCUTANEOUS at 13:35

## 2023-08-20 ASSESSMENT — PAIN SCALES - GENERAL
PAINLEVEL_OUTOF10: 9
PAINLEVEL_OUTOF10: 2
PAINLEVEL_OUTOF10: 1
PAINLEVEL_OUTOF10: 10

## 2023-08-20 ASSESSMENT — PAIN - FUNCTIONAL ASSESSMENT: PAIN_FUNCTIONAL_ASSESSMENT: 0-10

## 2023-08-20 ASSESSMENT — ENCOUNTER SYMPTOMS
RHINORRHEA: 0
COUGH: 0
EYE REDNESS: 0
EYE PAIN: 0
CONSTIPATION: 0
BACK PAIN: 0
DIARRHEA: 0
ABDOMINAL PAIN: 0
VOMITING: 0
NAUSEA: 0
SHORTNESS OF BREATH: 0

## 2023-08-20 ASSESSMENT — LIFESTYLE VARIABLES
HOW OFTEN DO YOU HAVE A DRINK CONTAINING ALCOHOL: NEVER
HOW MANY STANDARD DRINKS CONTAINING ALCOHOL DO YOU HAVE ON A TYPICAL DAY: PATIENT DOES NOT DRINK

## 2023-08-20 ASSESSMENT — PAIN DESCRIPTION - LOCATION: LOCATION: FLANK

## 2023-08-20 ASSESSMENT — PAIN DESCRIPTION - ORIENTATION: ORIENTATION: LEFT

## 2023-08-20 NOTE — ED PROVIDER NOTES
[facility]        Pt Name: Ariel Martin  MRN: [id number]  [address] [place] 1965  Date of evaluation: 8/20/2023  Provider: Korina Johnson DO  PCP: Jamey Qureshi MD  Note Started: 1:03 PM EDT 8/20/23    CHIEF COMPLAINT      Left Flank Pain    HISTORY OF PRESENT ILLNESS: 1 or more Elements     Chief Complaint   Patient presents with    Flank Pain     Pt is a dialysis pt, was here Friday for flank pain, pain is worse now     History from : Patient  Limitations to history : None    Ariel Martin is a 66 y.o. male who presents to the emergency department secondary to concern for left flank pain. Patient was seen and evaluated in the emergency department last Friday where he was diagnosed with a left renal cyst and a recently passed stone. Reports that he was discharged at that time with a prescription for Flomax, however he has been unable to fill that prescription until Wednesday. Reports that the pain significantly worsened today. Reports that he called his urology office and he was advised that if his pain got even worse to come to the emergency department. Patient admits to nausea, but no vomiting. Past medical history noted below. Aside from what is stated above denies any other symptoms or modifying factors. reports that he has never smoked. He has never used smokeless tobacco. He reports current alcohol use. He reports that he does not use drugs. Nursing Notes were all reviewed and agreed with or any disagreements addressed in HPI/MDM. REVIEW OF SYSTEMS :    Review of Systems   Constitutional:  Negative for chills and fever. HENT:  Negative for congestion and rhinorrhea. Eyes:  Negative for pain and redness. Respiratory:  Negative for cough and shortness of breath. Cardiovascular:  Negative for chest pain and leg swelling. Gastrointestinal:  Negative for abdominal pain, constipation, diarrhea, nausea and vomiting.

## 2023-08-21 ENCOUNTER — TELEMEDICINE (OUTPATIENT)
Dept: ENDOCRINOLOGY | Age: 58
End: 2023-08-21
Payer: MEDICARE

## 2023-08-21 DIAGNOSIS — E10.65 UNCONTROLLED TYPE 1 DIABETES MELLITUS WITH HYPERGLYCEMIA (HCC): Primary | ICD-10-CM

## 2023-08-21 PROCEDURE — 3052F HG A1C>EQUAL 8.0%<EQUAL 9.0%: CPT | Performed by: INTERNAL MEDICINE

## 2023-08-21 PROCEDURE — G8417 CALC BMI ABV UP PARAM F/U: HCPCS | Performed by: INTERNAL MEDICINE

## 2023-08-21 PROCEDURE — 3017F COLORECTAL CA SCREEN DOC REV: CPT | Performed by: INTERNAL MEDICINE

## 2023-08-21 PROCEDURE — 1036F TOBACCO NON-USER: CPT | Performed by: INTERNAL MEDICINE

## 2023-08-21 PROCEDURE — 1111F DSCHRG MED/CURRENT MED MERGE: CPT | Performed by: INTERNAL MEDICINE

## 2023-08-21 PROCEDURE — 99214 OFFICE O/P EST MOD 30 MIN: CPT | Performed by: INTERNAL MEDICINE

## 2023-08-21 PROCEDURE — 2022F DILAT RTA XM EVC RTNOPTHY: CPT | Performed by: INTERNAL MEDICINE

## 2023-08-21 PROCEDURE — G8427 DOCREV CUR MEDS BY ELIG CLIN: HCPCS | Performed by: INTERNAL MEDICINE

## 2023-08-21 RX ORDER — METOCLOPRAMIDE 5 MG/1
TABLET ORAL
Qty: 30 TABLET | Refills: 0 | Status: ON HOLD | OUTPATIENT
Start: 2023-08-21 | End: 2023-08-26 | Stop reason: SDUPTHER

## 2023-08-21 NOTE — PROGRESS NOTES
Seen as f/u patient for diabetes    Ankita Holt, was evaluated through a synchronous (real-time) audio-video encounter. The patient (or guardian if applicable) is aware that this is a billable service, which includes applicable co-pays. This Virtual Visit was conducted with patient's (and/or legal guardian's) consent. Patient identification was verified, and a caregiver was present when appropriate.    The patient was located at home  Provider was located at Facility    Interim:     Abdominal pain  TIA  States not hungry during the day but at night has snacks  Inquires about gastric scan  Request reglan prescription    Using Control -IQ  Needs A1c < 8 % for tranplant    Will be evaluated for kidney / pancreas transplant  ESRD  On dialysis - PD  Back on pump    Reports had some bruising swelling in Pectoral area  Seen cardiology    2/19 ER visit for hypoglycemia- ate less  10/19 DKA- pump not working  States lost insurance  Has dexcom/t slim    Diagnosed with Type 1 diabetes mellitus since age 17    Known diabetic complications: Nephropathy, Retinopathy, Neuropathy  Severe, uncontrolled    Current diabetic medications     New settings:    Basal   MN 0.675  6am  0.625  I:C  1:12  CF  1:58     Target 100    Previous:  Lantus 30 units HS  Humalog <140  No insulin  2 for 50 >150    Last A1c  8.9%<-----7.7%<----8.7%<---- 7%<----7.7%<----10.3%<------11.5 % on 7/19<------ 8.4%<----8.2%<-----8% <------9% on 5/17<----8.3 on 12/16<------8.3 on 3/16<--- 9.0 on 8/15<------8.6 on 5/15<----- 9.8 on 3/15<---9.8<---10.0    Prior visit with dietician: None since Age 17  Current diet: on average, 5 meals per day not familiar with CHO counting, seeing dietician, eats a snack at night as awake/has insomnia  Current exercise: walking   Current monitoring regimen: home blood tests -6-7  times daily     Has brought blood glucose log/meter: yes  Home blood sugar records:  Dexcom download  Last 2 weeks  Average 248  25% in range  74 %

## 2023-08-22 ENCOUNTER — APPOINTMENT (OUTPATIENT)
Dept: CT IMAGING | Age: 58
DRG: 660 | End: 2023-08-22
Payer: MEDICARE

## 2023-08-22 ENCOUNTER — HOSPITAL ENCOUNTER (INPATIENT)
Age: 58
LOS: 2 days | Discharge: HOME | DRG: 660 | End: 2023-08-26
Attending: INTERNAL MEDICINE | Admitting: INTERNAL MEDICINE
Payer: MEDICARE

## 2023-08-22 ENCOUNTER — TELEPHONE (OUTPATIENT)
Dept: OTHER | Facility: CLINIC | Age: 58
End: 2023-08-22

## 2023-08-22 ENCOUNTER — CLINICAL DOCUMENTATION ONLY (OUTPATIENT)
Facility: CLINIC | Age: 58
End: 2023-08-22

## 2023-08-22 DIAGNOSIS — R79.89 ELEVATED LACTIC ACID LEVEL: ICD-10-CM

## 2023-08-22 DIAGNOSIS — Z99.2 ESRD ON PERITONEAL DIALYSIS (HCC): ICD-10-CM

## 2023-08-22 DIAGNOSIS — N18.6 ESRD ON PERITONEAL DIALYSIS (HCC): ICD-10-CM

## 2023-08-22 DIAGNOSIS — R10.9 LEFT FLANK PAIN: Primary | ICD-10-CM

## 2023-08-22 DIAGNOSIS — Z71.89 GOALS OF CARE, COUNSELING/DISCUSSION: ICD-10-CM

## 2023-08-22 LAB
ALBUMIN SERPL-MCNC: 3.6 G/DL (ref 3.4–5)
ALP SERPL-CCNC: 121 U/L (ref 40–129)
ALT SERPL-CCNC: 14 U/L (ref 10–40)
ANION GAP SERPL CALCULATED.3IONS-SCNC: 14 MMOL/L (ref 3–16)
APPEARANCE FLUID: CLEAR
AST SERPL-CCNC: 14 U/L (ref 15–37)
BASOPHILS # BLD: 0 K/UL (ref 0–0.2)
BASOPHILS NFR BLD: 0.4 %
BDY FLUID QUALITY: NORMAL
BILIRUB DIRECT SERPL-MCNC: <0.2 MG/DL (ref 0–0.3)
BILIRUB INDIRECT SERPL-MCNC: ABNORMAL MG/DL (ref 0–1)
BILIRUB SERPL-MCNC: 0.3 MG/DL (ref 0–1)
BILIRUB UR QL STRIP.AUTO: NEGATIVE
BUN SERPL-MCNC: 49 MG/DL (ref 7–20)
CALCIUM SERPL-MCNC: 9.3 MG/DL (ref 8.3–10.6)
CELL COUNT FLUID TYPE: NORMAL
CHARACTER UR: NORMAL
CHLORIDE SERPL-SCNC: 94 MMOL/L (ref 99–110)
CHP ED QC CHECK: YES
CLARITY UR: CLEAR
CO2 SERPL-SCNC: 25 MMOL/L (ref 21–32)
COLOR FLUID: NORMAL
COLOR UR: YELLOW
CREAT SERPL-MCNC: 8.7 MG/DL (ref 0.9–1.3)
DEPRECATED RDW RBC AUTO: 15 % (ref 12.4–15.4)
EOSINOPHIL # BLD: 0.1 K/UL (ref 0–0.6)
EOSINOPHIL NFR BLD: 2.3 %
GFR SERPLBLD CREATININE-BSD FMLA CKD-EPI: 7 ML/MIN/{1.73_M2}
GLUCOSE BLD-MCNC: 237 MG/DL
GLUCOSE BLD-MCNC: 237 MG/DL (ref 70–99)
GLUCOSE BLD-MCNC: 256 MG/DL (ref 70–99)
GLUCOSE SERPL-MCNC: 267 MG/DL (ref 70–99)
GLUCOSE UR STRIP.AUTO-MCNC: 250 MG/DL
HCT VFR BLD AUTO: 47.2 % (ref 40.5–52.5)
HGB BLD-MCNC: 15.3 G/DL (ref 13.5–17.5)
HGB UR QL STRIP.AUTO: NEGATIVE
KETONES UR STRIP.AUTO-MCNC: NEGATIVE MG/DL
LACTATE BLDV-SCNC: 1.3 MMOL/L (ref 0.4–1.9)
LACTATE BLDV-SCNC: 2.1 MMOL/L (ref 0.4–1.9)
LEUKOCYTE ESTERASE UR QL STRIP.AUTO: ABNORMAL
LIPASE SERPL-CCNC: 23 U/L (ref 13–60)
LYMPHOCYTES # BLD: 1 K/UL (ref 1–5.1)
LYMPHOCYTES NFR BLD: 19.1 %
LYMPHOCYTES NFR FLD: 15 %
MACROPHAGES # FLD: 82 %
MCH RBC QN AUTO: 28.8 PG (ref 26–34)
MCHC RBC AUTO-ENTMCNC: 32.4 G/DL (ref 31–36)
MCV RBC AUTO: 89.1 FL (ref 80–100)
MONOCYTES # BLD: 0.9 K/UL (ref 0–1.3)
MONOCYTES NFR BLD: 16.9 %
NEUTROPHIL, FLUID: 3 %
NEUTROPHILS # BLD: 3.3 K/UL (ref 1.7–7.7)
NEUTROPHILS NFR BLD: 61.3 %
NITRITE UR QL STRIP.AUTO: NEGATIVE
NUC CELL # FLD: 91 /CUMM
PERFORMED ON: ABNORMAL
PERFORMED ON: ABNORMAL
PH UR STRIP.AUTO: 5.5 [PH] (ref 5–8)
PLATELET # BLD AUTO: 146 K/UL (ref 135–450)
PMV BLD AUTO: 10.3 FL (ref 5–10.5)
POTASSIUM SERPL-SCNC: 4.3 MMOL/L (ref 3.5–5.1)
PROT SERPL-MCNC: 7.1 G/DL (ref 6.4–8.2)
PROT UR STRIP.AUTO-MCNC: 100 MG/DL
RBC # BLD AUTO: 5.3 M/UL (ref 4.2–5.9)
RBC #/AREA URNS HPF: NORMAL /HPF (ref 0–4)
RBC FLUID: <1000 /CUMM
SODIUM SERPL-SCNC: 133 MMOL/L (ref 136–145)
SP GR UR STRIP.AUTO: 1.01 (ref 1–1.03)
TOTAL CELLS COUNTED FLD: 100
UA COMPLETE W REFLEX CULTURE PNL UR: ABNORMAL
UA DIPSTICK W REFLEX MICRO PNL UR: YES
URN SPEC COLLECT METH UR: ABNORMAL
UROBILINOGEN UR STRIP-ACNC: 0.2 E.U./DL
WBC # BLD AUTO: 5.3 K/UL (ref 4–11)
WBC #/AREA URNS HPF: NORMAL /HPF (ref 0–5)

## 2023-08-22 PROCEDURE — 6360000002 HC RX W HCPCS: Performed by: INTERNAL MEDICINE

## 2023-08-22 PROCEDURE — 2580000003 HC RX 258

## 2023-08-22 PROCEDURE — 74176 CT ABD & PELVIS W/O CONTRAST: CPT

## 2023-08-22 PROCEDURE — 83690 ASSAY OF LIPASE: CPT

## 2023-08-22 PROCEDURE — 81001 URINALYSIS AUTO W/SCOPE: CPT

## 2023-08-22 PROCEDURE — 85025 COMPLETE CBC W/AUTO DIFF WBC: CPT

## 2023-08-22 PROCEDURE — G0378 HOSPITAL OBSERVATION PER HR: HCPCS

## 2023-08-22 PROCEDURE — 96375 TX/PRO/DX INJ NEW DRUG ADDON: CPT

## 2023-08-22 PROCEDURE — 96376 TX/PRO/DX INJ SAME DRUG ADON: CPT

## 2023-08-22 PROCEDURE — 96361 HYDRATE IV INFUSION ADD-ON: CPT

## 2023-08-22 PROCEDURE — 80048 BASIC METABOLIC PNL TOTAL CA: CPT

## 2023-08-22 PROCEDURE — 80076 HEPATIC FUNCTION PANEL: CPT

## 2023-08-22 PROCEDURE — 89051 BODY FLUID CELL COUNT: CPT

## 2023-08-22 PROCEDURE — 99285 EMERGENCY DEPT VISIT HI MDM: CPT

## 2023-08-22 PROCEDURE — 6360000002 HC RX W HCPCS

## 2023-08-22 PROCEDURE — 83605 ASSAY OF LACTIC ACID: CPT

## 2023-08-22 PROCEDURE — 96365 THER/PROPH/DIAG IV INF INIT: CPT

## 2023-08-22 RX ORDER — CARVEDILOL 25 MG/1
25 TABLET ORAL 2 TIMES DAILY
Status: DISCONTINUED | OUTPATIENT
Start: 2023-08-22 | End: 2023-08-26 | Stop reason: HOSPADM

## 2023-08-22 RX ORDER — INSULIN GLARGINE 100 [IU]/ML
18 INJECTION, SOLUTION SUBCUTANEOUS NIGHTLY
Status: DISCONTINUED | OUTPATIENT
Start: 2023-08-23 | End: 2023-08-25

## 2023-08-22 RX ORDER — HYDRALAZINE HYDROCHLORIDE 100 MG/1
100 TABLET, FILM COATED ORAL 3 TIMES DAILY
Status: DISCONTINUED | OUTPATIENT
Start: 2023-08-22 | End: 2023-08-23

## 2023-08-22 RX ORDER — HYDROMORPHONE HYDROCHLORIDE 1 MG/ML
0.5 INJECTION, SOLUTION INTRAMUSCULAR; INTRAVENOUS; SUBCUTANEOUS ONCE
Status: COMPLETED | OUTPATIENT
Start: 2023-08-22 | End: 2023-08-22

## 2023-08-22 RX ORDER — HEPARIN SODIUM 5000 [USP'U]/ML
5000 INJECTION, SOLUTION INTRAVENOUS; SUBCUTANEOUS EVERY 8 HOURS SCHEDULED
Status: DISCONTINUED | OUTPATIENT
Start: 2023-08-22 | End: 2023-08-26 | Stop reason: HOSPADM

## 2023-08-22 RX ORDER — ONDANSETRON 2 MG/ML
4 INJECTION INTRAMUSCULAR; INTRAVENOUS EVERY 6 HOURS PRN
Status: DISCONTINUED | OUTPATIENT
Start: 2023-08-22 | End: 2023-08-26 | Stop reason: HOSPADM

## 2023-08-22 RX ORDER — FUROSEMIDE 40 MG/1
80 TABLET ORAL 2 TIMES DAILY
Status: DISCONTINUED | OUTPATIENT
Start: 2023-08-22 | End: 2023-08-26 | Stop reason: HOSPADM

## 2023-08-22 RX ORDER — INSULIN LISPRO 100 [IU]/ML
0-4 INJECTION, SOLUTION INTRAVENOUS; SUBCUTANEOUS NIGHTLY
Status: DISCONTINUED | OUTPATIENT
Start: 2023-08-23 | End: 2023-08-25

## 2023-08-22 RX ORDER — NIFEDIPINE 30 MG/1
30 TABLET, FILM COATED, EXTENDED RELEASE ORAL DAILY
Status: DISCONTINUED | OUTPATIENT
Start: 2023-08-22 | End: 2023-08-26 | Stop reason: HOSPADM

## 2023-08-22 RX ORDER — INSULIN LISPRO 100 [IU]/ML
0-8 INJECTION, SOLUTION INTRAVENOUS; SUBCUTANEOUS
Status: DISCONTINUED | OUTPATIENT
Start: 2023-08-23 | End: 2023-08-25

## 2023-08-22 RX ORDER — POLYETHYLENE GLYCOL 3350 17 G/17G
17 POWDER, FOR SOLUTION ORAL DAILY PRN
Status: DISCONTINUED | OUTPATIENT
Start: 2023-08-22 | End: 2023-08-26 | Stop reason: HOSPADM

## 2023-08-22 RX ORDER — SODIUM CHLORIDE, SODIUM LACTATE, POTASSIUM CHLORIDE, AND CALCIUM CHLORIDE .6; .31; .03; .02 G/100ML; G/100ML; G/100ML; G/100ML
1000 INJECTION, SOLUTION INTRAVENOUS ONCE
Status: DISCONTINUED | OUTPATIENT
Start: 2023-08-22 | End: 2023-08-22

## 2023-08-22 RX ORDER — SODIUM CHLORIDE, SODIUM LACTATE, POTASSIUM CHLORIDE, AND CALCIUM CHLORIDE .6; .31; .03; .02 G/100ML; G/100ML; G/100ML; G/100ML
500 INJECTION, SOLUTION INTRAVENOUS ONCE
Status: COMPLETED | OUTPATIENT
Start: 2023-08-22 | End: 2023-08-22

## 2023-08-22 RX ORDER — ACETAMINOPHEN 325 MG/1
650 TABLET ORAL EVERY 6 HOURS PRN
Status: DISCONTINUED | OUTPATIENT
Start: 2023-08-22 | End: 2023-08-26 | Stop reason: HOSPADM

## 2023-08-22 RX ORDER — DROPERIDOL 2.5 MG/ML
1.25 INJECTION, SOLUTION INTRAMUSCULAR; INTRAVENOUS ONCE
Status: COMPLETED | OUTPATIENT
Start: 2023-08-22 | End: 2023-08-22

## 2023-08-22 RX ORDER — DIPHENHYDRAMINE HYDROCHLORIDE 50 MG/ML
50 INJECTION INTRAMUSCULAR; INTRAVENOUS ONCE
Status: COMPLETED | OUTPATIENT
Start: 2023-08-22 | End: 2023-08-22

## 2023-08-22 RX ORDER — INSULIN LISPRO 100 [IU]/ML
0-8 INJECTION, SOLUTION INTRAVENOUS; SUBCUTANEOUS
Status: DISCONTINUED | OUTPATIENT
Start: 2023-08-23 | End: 2023-08-23 | Stop reason: SDUPTHER

## 2023-08-22 RX ORDER — DEXTROSE MONOHYDRATE 100 MG/ML
INJECTION, SOLUTION INTRAVENOUS CONTINUOUS PRN
Status: DISCONTINUED | OUTPATIENT
Start: 2023-08-22 | End: 2023-08-26 | Stop reason: HOSPADM

## 2023-08-22 RX ORDER — RANOLAZINE 500 MG/1
TABLET, EXTENDED RELEASE ORAL
Qty: 60 TABLET | Refills: 5 | Status: SHIPPED | OUTPATIENT
Start: 2023-08-22

## 2023-08-22 RX ORDER — SODIUM CHLORIDE 0.9 % (FLUSH) 0.9 %
5-40 SYRINGE (ML) INJECTION EVERY 12 HOURS SCHEDULED
Status: DISCONTINUED | OUTPATIENT
Start: 2023-08-22 | End: 2023-08-26 | Stop reason: HOSPADM

## 2023-08-22 RX ORDER — HYDROMORPHONE HYDROCHLORIDE 1 MG/ML
0.5 INJECTION, SOLUTION INTRAMUSCULAR; INTRAVENOUS; SUBCUTANEOUS EVERY 4 HOURS PRN
Status: DISCONTINUED | OUTPATIENT
Start: 2023-08-22 | End: 2023-08-26 | Stop reason: HOSPADM

## 2023-08-22 RX ORDER — SODIUM CHLORIDE 0.9 % (FLUSH) 0.9 %
5-40 SYRINGE (ML) INJECTION PRN
Status: DISCONTINUED | OUTPATIENT
Start: 2023-08-22 | End: 2023-08-26 | Stop reason: HOSPADM

## 2023-08-22 RX ORDER — INSULIN LISPRO 100 [IU]/ML
0-4 INJECTION, SOLUTION INTRAVENOUS; SUBCUTANEOUS NIGHTLY
Status: DISCONTINUED | OUTPATIENT
Start: 2023-08-23 | End: 2023-08-23 | Stop reason: SDUPTHER

## 2023-08-22 RX ORDER — CLOPIDOGREL BISULFATE 75 MG/1
75 TABLET ORAL DAILY
Status: DISCONTINUED | OUTPATIENT
Start: 2023-08-22 | End: 2023-08-26 | Stop reason: HOSPADM

## 2023-08-22 RX ORDER — SODIUM CHLORIDE 9 MG/ML
INJECTION, SOLUTION INTRAVENOUS PRN
Status: DISCONTINUED | OUTPATIENT
Start: 2023-08-22 | End: 2023-08-26 | Stop reason: HOSPADM

## 2023-08-22 RX ORDER — ONDANSETRON 4 MG/1
4 TABLET, ORALLY DISINTEGRATING ORAL EVERY 8 HOURS PRN
Status: DISCONTINUED | OUTPATIENT
Start: 2023-08-22 | End: 2023-08-26 | Stop reason: HOSPADM

## 2023-08-22 RX ORDER — ISOSORBIDE MONONITRATE 30 MG/1
30 TABLET, EXTENDED RELEASE ORAL DAILY
Status: DISCONTINUED | OUTPATIENT
Start: 2023-08-22 | End: 2023-08-26 | Stop reason: HOSPADM

## 2023-08-22 RX ORDER — ACETAMINOPHEN 650 MG/1
650 SUPPOSITORY RECTAL EVERY 6 HOURS PRN
Status: DISCONTINUED | OUTPATIENT
Start: 2023-08-22 | End: 2023-08-26 | Stop reason: HOSPADM

## 2023-08-22 RX ORDER — GLUCAGON 1 MG/ML
1 KIT INJECTION PRN
Status: DISCONTINUED | OUTPATIENT
Start: 2023-08-22 | End: 2023-08-26 | Stop reason: HOSPADM

## 2023-08-22 RX ORDER — ASPIRIN 81 MG/1
81 TABLET ORAL DAILY
Status: DISCONTINUED | OUTPATIENT
Start: 2023-08-22 | End: 2023-08-26 | Stop reason: HOSPADM

## 2023-08-22 RX ADMIN — DROPERIDOL 1.25 MG: 2.5 INJECTION, SOLUTION INTRAMUSCULAR; INTRAVENOUS at 10:29

## 2023-08-22 RX ADMIN — SODIUM CHLORIDE, POTASSIUM CHLORIDE, SODIUM LACTATE AND CALCIUM CHLORIDE 500 ML: 600; 310; 30; 20 INJECTION, SOLUTION INTRAVENOUS at 10:33

## 2023-08-22 RX ADMIN — HYDROMORPHONE HYDROCHLORIDE 0.5 MG: 1 INJECTION, SOLUTION INTRAMUSCULAR; INTRAVENOUS; SUBCUTANEOUS at 16:37

## 2023-08-22 RX ADMIN — HYDROMORPHONE HYDROCHLORIDE 0.5 MG: 1 INJECTION, SOLUTION INTRAMUSCULAR; INTRAVENOUS; SUBCUTANEOUS at 23:28

## 2023-08-22 RX ADMIN — PIPERACILLIN AND TAZOBACTAM 4500 MG: 4; .5 INJECTION, POWDER, LYOPHILIZED, FOR SOLUTION INTRAVENOUS at 15:45

## 2023-08-22 RX ADMIN — HYDROMORPHONE HYDROCHLORIDE 0.5 MG: 1 INJECTION, SOLUTION INTRAMUSCULAR; INTRAVENOUS; SUBCUTANEOUS at 11:05

## 2023-08-22 RX ADMIN — DIPHENHYDRAMINE HYDROCHLORIDE 50 MG: 50 INJECTION INTRAMUSCULAR; INTRAVENOUS at 10:29

## 2023-08-22 ASSESSMENT — PAIN DESCRIPTION - ORIENTATION
ORIENTATION: LEFT

## 2023-08-22 ASSESSMENT — PAIN DESCRIPTION - LOCATION
LOCATION: ABDOMEN
LOCATION: ABDOMEN
LOCATION: FLANK

## 2023-08-22 ASSESSMENT — ENCOUNTER SYMPTOMS
SHORTNESS OF BREATH: 0
BACK PAIN: 0
COUGH: 0
NAUSEA: 1
RHINORRHEA: 0
SORE THROAT: 0
CONSTIPATION: 0
DIARRHEA: 0
VOMITING: 1
ABDOMINAL PAIN: 1
EYE PAIN: 0

## 2023-08-22 ASSESSMENT — LIFESTYLE VARIABLES
HOW OFTEN DO YOU HAVE A DRINK CONTAINING ALCOHOL: MONTHLY OR LESS
HOW MANY STANDARD DRINKS CONTAINING ALCOHOL DO YOU HAVE ON A TYPICAL DAY: 1 OR 2

## 2023-08-22 ASSESSMENT — PAIN - FUNCTIONAL ASSESSMENT: PAIN_FUNCTIONAL_ASSESSMENT: 0-10

## 2023-08-22 ASSESSMENT — PAIN DESCRIPTION - DESCRIPTORS
DESCRIPTORS: ACHING;DISCOMFORT
DESCRIPTORS: ACHING;PRESSURE;SHARP

## 2023-08-22 ASSESSMENT — PAIN SCALES - GENERAL
PAINLEVEL_OUTOF10: 2
PAINLEVEL_OUTOF10: 10
PAINLEVEL_OUTOF10: 4
PAINLEVEL_OUTOF10: 10
PAINLEVEL_OUTOF10: 6
PAINLEVEL_OUTOF10: 0

## 2023-08-22 ASSESSMENT — PAIN DESCRIPTION - FREQUENCY: FREQUENCY: CONTINUOUS

## 2023-08-22 ASSESSMENT — PAIN DESCRIPTION - PAIN TYPE: TYPE: ACUTE PAIN

## 2023-08-22 NOTE — ED NOTES
ED TO INPATIENT SBAR HANDOFF    Patient Name: Sosa Yates   :  1965  66 y.o. MRN:  [id number]  Preferred Name  [facility]  ED Room #:  ED-0018/18  Family/Caregiver Present no   Restraints no   Sitter no   Sepsis Risk Score Sepsis Risk Score: 2.23    Situation  Code Status: Prior No additional code details. Allergies: Iodides, Shellfish-derived products, and Atorvastatin calcium  Weight: Patient Vitals for the past 96 hrs (Last 3 readings):   Weight   23 1004 200 lb (90.7 kg)     Arrived from: home  Chief Complaint:   Chief Complaint   Patient presents with    Flank Pain     Patient in by [facility] from home, states L sided flank pain. Was recently admitted for kidney stones, cyst on left kidney and \"they told me my left kidney was inflamed\". Patient is Dialysis patient, peritoneal everyday, makes urine. Hospital Problem/Diagnosis:  Principal Problem:    Left flank pain  Resolved Problems:    * No resolved hospital problems. *    Imaging:   CT ABDOMEN PELVIS WO CONTRAST Additional Contrast? None   Final Result   No change from prior examination. Mild left hydronephrosis and hydroureter without evidence of radiopaque renal   calculus. Peritoneal dialysis in place with some mild free fluid in the abdomen. Moderate renal atrophy. Bilateral renal calculi or vascular calcifications   noted.   4.2 cm left renal cyst.           Abnormal labs:   Abnormal Labs Reviewed   URINALYSIS WITH REFLEX TO CULTURE - Abnormal; Notable for the following components:       Result Value    Glucose, Ur 250 (*)     Protein,  (*)     Leukocyte Esterase, Urine SMALL (*)     All other components within normal limits   HEPATIC FUNCTION PANEL - Abnormal; Notable for the following components:    AST 14 (*)     All other components within normal limits    Narrative:     Zoey Alberts  SFERF tel. [phone],  Chemistry results called to and read back by Sanjeev Lovelace RN, 2023  11:05, by [address] [facility]

## 2023-08-22 NOTE — ED NOTES
Peritoneal fluid sample collected at this time and walked down to lab by this RN.       Evangeline Primrose, RN  08/22/23 0594

## 2023-08-22 NOTE — H&P
Diabetic peripheral neuropathy ([address]), Diastolic HF (heart failure) ([address]), Hyperlipidemia with target LDL less than 70, Hypertension goal BP (blood pressure) < 130/80, PVD (peripheral vascular disease) ([address]), Seizures ([address]), and Type 1 diabetes mellitus with chronic kidney disease ([address]). Past Surgical History:   has a past surgical history that includes Cardiac surgery; Coronary artery bypass graft; Cardiac catheterization; hernia repair; Tonsillectomy; LAPAROSCOPY INSERTION PERITONEAL CATHETER (N/A, 6/3/2020); and Carotid stent placement (Right). Medications:  No current facility-administered medications on file prior to encounter. Current Outpatient Medications on File Prior to Encounter   Medication Sig Dispense Refill    metoclopramide (REGLAN) 5 MG tablet Take with each meal 30 tablet 0    tamsulosin (FLOMAX) 0.4 MG capsule Take 1 capsule by mouth daily for 7 days 7 capsule 0    HYDROcodone-acetaminophen (NORCO) 5-325 MG per tablet Take 1 tablet by mouth every 6 hours as needed for Pain for up to 3 days. Intended supply: 3 days.  Take lowest dose possible to manage pain Max Daily Amount: 4 tablets 12 tablet 0    isosorbide mononitrate (IMDUR) 30 MG extended release tablet TAKE 1 TABLET BY MOUTH DAILY 90 tablet 2    clopidogrel (PLAVIX) 75 MG tablet Take 1 tablet by mouth daily 90 tablet 2    hydrALAZINE (APRESOLINE) 100 MG tablet TAKE 1 TABLET BY MOUTH THREE TIMES DAILY 90 tablet 1    NIFEdipine (ADALAT CC) 30 MG extended release tablet Take 1 tablet by mouth daily 30 tablet 3    sacubitril-valsartan (ENTRESTO) 49-51 MG per tablet Take 1 tablet by mouth 2 times daily 60 tablet 3    Continuous Blood Gluc Sensor (DEXCOM G6 SENSOR) MISC USE AS DIRECTED CHANGE EVERY 10 DAYS 3 each 3    Continuous Blood Gluc Transmit (DEXCOM G6 TRANSMITTER) MISC Change every 90 days 1 each 1    insulin aspart (NOVOLOG) 100 UNIT/ML injection vial 30-40 units daily via pump 20 mL 3    insulin lispro (HUMALOG) 100 UNIT/ML

## 2023-08-22 NOTE — ED NOTES
Pt wife states that his PCP told them to request a consult with a HTN kidney doctor.      Saurav Roger RN  08/22/23 7954

## 2023-08-22 NOTE — ED NOTES
Transported via transportation team at this time     [facility] DR ROBERTO TRUJILLO  16/34/99 9980

## 2023-08-22 NOTE — TELEPHONE ENCOUNTER
Writer contacted ED provider to inform of 30 day readmission risk. No Decision on disposition at this time.       Call Back: If you need to call back to inform of disposition you can contact me at [phone]

## 2023-08-23 LAB
ANION GAP SERPL CALCULATED.3IONS-SCNC: 20 MMOL/L (ref 3–16)
BASOPHILS # BLD: 0 K/UL (ref 0–0.2)
BASOPHILS NFR BLD: 0.4 %
BUN SERPL-MCNC: 55 MG/DL (ref 7–20)
CALCIUM SERPL-MCNC: 8.7 MG/DL (ref 8.3–10.6)
CHLORIDE SERPL-SCNC: 94 MMOL/L (ref 99–110)
CO2 SERPL-SCNC: 20 MMOL/L (ref 21–32)
CREAT SERPL-MCNC: 9.1 MG/DL (ref 0.9–1.3)
DEPRECATED RDW RBC AUTO: 15 % (ref 12.4–15.4)
EOSINOPHIL # BLD: 0.1 K/UL (ref 0–0.6)
EOSINOPHIL NFR BLD: 1.2 %
GFR SERPLBLD CREATININE-BSD FMLA CKD-EPI: 6 ML/MIN/{1.73_M2}
GLUCOSE BLD-MCNC: 184 MG/DL (ref 70–99)
GLUCOSE BLD-MCNC: 197 MG/DL (ref 70–99)
GLUCOSE BLD-MCNC: 307 MG/DL (ref 70–99)
GLUCOSE BLD-MCNC: 355 MG/DL (ref 70–99)
GLUCOSE SERPL-MCNC: 418 MG/DL (ref 70–99)
HCT VFR BLD AUTO: 44.2 % (ref 40.5–52.5)
HGB BLD-MCNC: 14.4 G/DL (ref 13.5–17.5)
LYMPHOCYTES # BLD: 0.9 K/UL (ref 1–5.1)
LYMPHOCYTES NFR BLD: 15.6 %
MCH RBC QN AUTO: 29 PG (ref 26–34)
MCHC RBC AUTO-ENTMCNC: 32.5 G/DL (ref 31–36)
MCV RBC AUTO: 89.5 FL (ref 80–100)
MONOCYTES # BLD: 0.7 K/UL (ref 0–1.3)
MONOCYTES NFR BLD: 11.4 %
NEUTROPHILS # BLD: 4.2 K/UL (ref 1.7–7.7)
NEUTROPHILS NFR BLD: 71.4 %
PERFORMED ON: ABNORMAL
PHOSPHATE SERPL-MCNC: 5 MG/DL (ref 2.5–4.9)
PLATELET # BLD AUTO: 133 K/UL (ref 135–450)
PMV BLD AUTO: 10.5 FL (ref 5–10.5)
POTASSIUM SERPL-SCNC: 5.2 MMOL/L (ref 3.5–5.1)
RBC # BLD AUTO: 4.94 M/UL (ref 4.2–5.9)
SODIUM SERPL-SCNC: 134 MMOL/L (ref 136–145)
WBC # BLD AUTO: 5.9 K/UL (ref 4–11)

## 2023-08-23 PROCEDURE — 85025 COMPLETE CBC W/AUTO DIFF WBC: CPT

## 2023-08-23 PROCEDURE — 36415 COLL VENOUS BLD VENIPUNCTURE: CPT

## 2023-08-23 PROCEDURE — 6370000000 HC RX 637 (ALT 250 FOR IP): Performed by: INTERNAL MEDICINE

## 2023-08-23 PROCEDURE — 80048 BASIC METABOLIC PNL TOTAL CA: CPT

## 2023-08-23 PROCEDURE — 2580000003 HC RX 258: Performed by: INTERNAL MEDICINE

## 2023-08-23 PROCEDURE — 6370000000 HC RX 637 (ALT 250 FOR IP): Performed by: NURSE PRACTITIONER

## 2023-08-23 PROCEDURE — 90945 DIALYSIS ONE EVALUATION: CPT

## 2023-08-23 PROCEDURE — 96375 TX/PRO/DX INJ NEW DRUG ADDON: CPT

## 2023-08-23 PROCEDURE — 6360000002 HC RX W HCPCS: Performed by: INTERNAL MEDICINE

## 2023-08-23 PROCEDURE — 96372 THER/PROPH/DIAG INJ SC/IM: CPT

## 2023-08-23 PROCEDURE — 84100 ASSAY OF PHOSPHORUS: CPT

## 2023-08-23 PROCEDURE — 96376 TX/PRO/DX INJ SAME DRUG ADON: CPT

## 2023-08-23 PROCEDURE — G0378 HOSPITAL OBSERVATION PER HR: HCPCS

## 2023-08-23 RX ORDER — HYDRALAZINE HYDROCHLORIDE 25 MG/1
50 TABLET, FILM COATED ORAL 3 TIMES DAILY
Status: DISCONTINUED | OUTPATIENT
Start: 2023-08-23 | End: 2023-08-26 | Stop reason: HOSPADM

## 2023-08-23 RX ORDER — INSULIN LISPRO 100 [IU]/ML
5 INJECTION, SOLUTION INTRAVENOUS; SUBCUTANEOUS
Status: DISCONTINUED | OUTPATIENT
Start: 2023-08-23 | End: 2023-08-25

## 2023-08-23 RX ORDER — RANOLAZINE 500 MG/1
500 TABLET, EXTENDED RELEASE ORAL 2 TIMES DAILY
Status: DISCONTINUED | OUTPATIENT
Start: 2023-08-23 | End: 2023-08-26 | Stop reason: HOSPADM

## 2023-08-23 RX ORDER — GENTAMICIN SULFATE 1 MG/G
CREAM TOPICAL DAILY
Status: DISCONTINUED | OUTPATIENT
Start: 2023-08-23 | End: 2023-08-26 | Stop reason: HOSPADM

## 2023-08-23 RX ORDER — SODIUM CHLORIDE, SODIUM LACTATE, CALCIUM CHLORIDE, MAGNESIUM CHLORIDE AND DEXTROSE 2.5; 538; 448; 18.3; 5.08 G/100ML; MG/100ML; MG/100ML; MG/100ML; MG/100ML
5000 INJECTION, SOLUTION INTRAPERITONEAL EVERY 6 HOURS
Status: DISCONTINUED | OUTPATIENT
Start: 2023-08-23 | End: 2023-08-26 | Stop reason: HOSPADM

## 2023-08-23 RX ORDER — SODIUM CHLORIDE, SODIUM LACTATE, CALCIUM CHLORIDE, MAGNESIUM CHLORIDE AND DEXTROSE 1.5; 538; 448; 18.3; 5.08 G/100ML; MG/100ML; MG/100ML; MG/100ML; MG/100ML
2000 INJECTION, SOLUTION INTRAPERITONEAL ONCE
Status: DISCONTINUED | OUTPATIENT
Start: 2023-08-23 | End: 2023-08-24

## 2023-08-23 RX ADMIN — INSULIN LISPRO 5 UNITS: 100 INJECTION, SOLUTION INTRAVENOUS; SUBCUTANEOUS at 17:40

## 2023-08-23 RX ADMIN — FUROSEMIDE 80 MG: 40 TABLET ORAL at 20:33

## 2023-08-23 RX ADMIN — INSULIN LISPRO 5 UNITS: 100 INJECTION, SOLUTION INTRAVENOUS; SUBCUTANEOUS at 14:26

## 2023-08-23 RX ADMIN — SODIUM CHLORIDE, PRESERVATIVE FREE 10 ML: 5 INJECTION INTRAVENOUS at 00:39

## 2023-08-23 RX ADMIN — ONDANSETRON 4 MG: 2 INJECTION INTRAMUSCULAR; INTRAVENOUS at 20:38

## 2023-08-23 RX ADMIN — INSULIN GLARGINE 18 UNITS: 100 INJECTION, SOLUTION SUBCUTANEOUS at 20:44

## 2023-08-23 RX ADMIN — POLYETHYLENE GLYCOL 3350 17 G: 17 POWDER, FOR SOLUTION ORAL at 20:41

## 2023-08-23 RX ADMIN — HEPARIN SODIUM 5000 UNITS: 5000 INJECTION INTRAVENOUS; SUBCUTANEOUS at 20:34

## 2023-08-23 RX ADMIN — CLOPIDOGREL BISULFATE 75 MG: 75 TABLET ORAL at 00:38

## 2023-08-23 RX ADMIN — ISOSORBIDE MONONITRATE 30 MG: 30 TABLET, EXTENDED RELEASE ORAL at 00:38

## 2023-08-23 RX ADMIN — HEPARIN SODIUM 5000 UNITS: 5000 INJECTION INTRAVENOUS; SUBCUTANEOUS at 00:37

## 2023-08-23 RX ADMIN — HYDRALAZINE HYDROCHLORIDE 100 MG: 100 TABLET, FILM COATED ORAL at 00:37

## 2023-08-23 RX ADMIN — HYDROMORPHONE HYDROCHLORIDE 0.5 MG: 1 INJECTION, SOLUTION INTRAMUSCULAR; INTRAVENOUS; SUBCUTANEOUS at 19:37

## 2023-08-23 RX ADMIN — INSULIN LISPRO 6 UNITS: 100 INJECTION, SOLUTION INTRAVENOUS; SUBCUTANEOUS at 14:26

## 2023-08-23 RX ADMIN — INSULIN GLARGINE 18 UNITS: 100 INJECTION, SOLUTION SUBCUTANEOUS at 00:37

## 2023-08-23 RX ADMIN — HEPARIN SODIUM 5000 UNITS: 5000 INJECTION INTRAVENOUS; SUBCUTANEOUS at 14:27

## 2023-08-23 RX ADMIN — INSULIN LISPRO 8 UNITS: 100 INJECTION, SOLUTION INTRAVENOUS; SUBCUTANEOUS at 09:45

## 2023-08-23 RX ADMIN — HEPARIN SODIUM 5000 UNITS: 5000 INJECTION INTRAVENOUS; SUBCUTANEOUS at 06:46

## 2023-08-23 RX ADMIN — HYDRALAZINE HYDROCHLORIDE 50 MG: 25 TABLET, FILM COATED ORAL at 20:33

## 2023-08-23 RX ADMIN — ONDANSETRON 4 MG: 2 INJECTION INTRAMUSCULAR; INTRAVENOUS at 06:46

## 2023-08-23 RX ADMIN — RANOLAZINE 500 MG: 500 TABLET, FILM COATED, EXTENDED RELEASE ORAL at 20:33

## 2023-08-23 RX ADMIN — RANOLAZINE 500 MG: 500 TABLET, FILM COATED, EXTENDED RELEASE ORAL at 00:44

## 2023-08-23 RX ADMIN — FUROSEMIDE 80 MG: 40 TABLET ORAL at 00:38

## 2023-08-23 RX ADMIN — CARVEDILOL 25 MG: 25 TABLET, FILM COATED ORAL at 00:38

## 2023-08-23 RX ADMIN — ASPIRIN 81 MG: 81 TABLET, COATED ORAL at 00:37

## 2023-08-23 RX ADMIN — CARVEDILOL 25 MG: 25 TABLET, FILM COATED ORAL at 20:33

## 2023-08-23 RX ADMIN — SODIUM CHLORIDE, PRESERVATIVE FREE 10 ML: 5 INJECTION INTRAVENOUS at 19:37

## 2023-08-23 ASSESSMENT — PAIN DESCRIPTION - LOCATION: LOCATION: FLANK

## 2023-08-23 ASSESSMENT — PAIN SCALES - GENERAL: PAINLEVEL_OUTOF10: 7

## 2023-08-23 NOTE — CARE COORDINATION
Discharge Planning Note:    Chart reviewed and it appears that patient has minimal needs for discharge at this time. Discussed with patient and requested that case management be notified if discharge needs are identified.     - Current discharge plan is for the patient to return home. Case management will continue to follow progress and update discharge plan as needed.       Risk of Readmission Score: Observation    IRMA Munoz RN    [facility]  Phone: [phone]

## 2023-08-23 NOTE — CONSULTS
ESKD  Nephrology Consult Note  [phone]   [email or web address]        Reason for Consult:  ESKD    HISTORY OF PRESENT ILLNESS:                This is a patient with significant past medical history of ESKD on CCPD, followed by Dr. Melissa Goodrich, KELLY  s/p right SUZANNA stent, CAD/CABG/CHF/DM1/PVD/CVA/DERRICK seizures, nephrolithiasis, recent adm at [facility] on 8/17/23 for left flank pain/HN, seen by Urology during last adm, no intervention who presents with recurrent left flank pain. Pt states he was seen in ED 3 times since last discharge. He reports nausea and vomiting since Monday. Report chills/diaphoresis, reports constipation. States effluent has been clear. Missed PD last night due to admission.   CT of A/P showed mild left HN and HU unchanged, 4.2cm left renal cyst.    Past Medical History:        Diagnosis Date    Angina at rest [facility])     Cardiomyopathy ([address])     Carotid artery stenosis 10/25/2016    SUZANNA stented with 8 x 30 mm non-tapered Xact stent    CHF (congestive heart failure) ([address]) 3/3/15    EF 30%     CKD (chronic kidney disease) stage 2, GFR 60-89 ml/min     Coronary artery disease     sp CABG [facility]    Diabetic peripheral neuropathy (HCC)     Diastolic HF (heart failure) (HCC)     Hyperlipidemia with target LDL less than 70     Hypertension goal BP (blood pressure) < 130/80     PVD (peripheral vascular disease) (HCC)     Seizures ([address])     Type 1 diabetes mellitus with chronic kidney disease ([facility])     c-peptide <0.1 in 2015       Past Surgical History:        Procedure Laterality Date    CARDIAC CATHETERIZATION      CARDIAC SURGERY      triple bypass    CAROTID STENT PLACEMENT Right     CORONARY ARTERY BYPASS GRAFT      HERNIA REPAIR      LAPAROSCOPY INSERTION PERITONEAL CATHETER N/A 6/3/2020    LAPAROSCOPIC PLACEMENT OF PERITONEAL DIALYSIS  CATHETER performed by Raleigh Yu MD at [facility]         Current Medications:    No current facility-administered medications on file prior to
pain/left flank pain Relevant Medical/Surgical History: patient on dialysis FINDINGS: Patient body habitus limits the study, as there is increased attenuation of the ultrasound beam. This decreases sensitivity and specificity. Kidneys: The right kidney measures 7.3 cm in length and the left kidney measures 10.2 cm in length. No hydronephrosis right. No hydronephrosis on the left. 3.5 cm cyst seen in left kidney. Kidneys appear mildly echogenic Peritoneal dialysis catheter is seen Bladder: Collapsed and not well evaluated. Echogenic kidneys, likely due to medical renal disease. No hydronephrosis noted.   Cyst is seen in the left kidney            Electronically signed by: JAYLEN Perez CNP  8/23/2023   [facility]  Office Contact: [phone]

## 2023-08-23 NOTE — PLAN OF CARE
Admission assessment complete. Pt observed to remove insulin pump reporting that it is not working. Secure message to on-call NP Channing Cardoza to advise of pt status and request appropriate order set for DM1 pt. Pt received PRN for flank pain. NPO since midnight. The care plan and education has been reviewed and mutually agreed upon with the patient. Patient remains free from falls. All fall precautions in place. Bed and chair alarms being used. Bed in lowest position.  Will monitor.   ---------------------------------------------  Problem: ABCDS Injury Assessment  Goal: Absence of physical injury  Outcome: Progressing     Problem: Discharge Planning  Goal: Discharge to home or other facility with appropriate resources  Outcome: Progressing     Problem: Pain  Goal: Verbalizes/displays adequate comfort level or baseline comfort level  Outcome: Progressing

## 2023-08-24 ENCOUNTER — ANESTHESIA (OUTPATIENT)
Dept: OPERATING ROOM | Age: 58
DRG: 660 | End: 2023-08-24
Payer: MEDICARE

## 2023-08-24 ENCOUNTER — APPOINTMENT (OUTPATIENT)
Dept: GENERAL RADIOLOGY | Age: 58
DRG: 660 | End: 2023-08-24
Payer: MEDICARE

## 2023-08-24 ENCOUNTER — ANESTHESIA EVENT (OUTPATIENT)
Dept: OPERATING ROOM | Age: 58
DRG: 660 | End: 2023-08-24
Payer: MEDICARE

## 2023-08-24 PROBLEM — R10.9 FLANK PAIN: Status: ACTIVE | Noted: 2023-08-24

## 2023-08-24 LAB
ANION GAP SERPL CALCULATED.3IONS-SCNC: 14 MMOL/L (ref 3–16)
APPEARANCE FLUID: CLEAR
BDY FLUID QUALITY: NORMAL
BUN SERPL-MCNC: 54 MG/DL (ref 7–20)
CALCIUM SERPL-MCNC: 8.8 MG/DL (ref 8.3–10.6)
CELL COUNT FLUID TYPE: NORMAL
CHLORIDE SERPL-SCNC: 99 MMOL/L (ref 99–110)
CO2 SERPL-SCNC: 25 MMOL/L (ref 21–32)
COLOR FLUID: NORMAL
CREAT SERPL-MCNC: 9.5 MG/DL (ref 0.9–1.3)
GFR SERPLBLD CREATININE-BSD FMLA CKD-EPI: 6 ML/MIN/{1.73_M2}
GLUCOSE BLD-MCNC: 195 MG/DL (ref 70–99)
GLUCOSE BLD-MCNC: 344 MG/DL (ref 70–99)
GLUCOSE BLD-MCNC: 448 MG/DL (ref 70–99)
GLUCOSE BLD-MCNC: 488 MG/DL (ref 70–99)
GLUCOSE BLD-MCNC: 81 MG/DL (ref 70–99)
GLUCOSE BLD-MCNC: 95 MG/DL (ref 70–99)
GLUCOSE SERPL-MCNC: 166 MG/DL (ref 70–99)
LYMPHOCYTES NFR FLD: 28 %
MAGNESIUM SERPL-MCNC: 2.1 MG/DL (ref 1.8–2.4)
MONOCYTES NFR FLD: 69 %
MONONUCLEAR UNIDENTIFIED CELLS FLUID: 2 %
NEUTROPHIL, FLUID: 1 %
NUC CELL # FLD: 83 /CUMM
PERFORMED ON: ABNORMAL
PERFORMED ON: NORMAL
PERFORMED ON: NORMAL
PHOSPHATE SERPL-MCNC: 3.4 MG/DL (ref 2.5–4.9)
POTASSIUM SERPL-SCNC: 3.4 MMOL/L (ref 3.5–5.1)
RBC FLUID: <1000 /CUMM
SODIUM SERPL-SCNC: 138 MMOL/L (ref 136–145)
TOTAL CELLS COUNTED FLD: 100

## 2023-08-24 PROCEDURE — 87070 CULTURE OTHR SPECIMN AEROBIC: CPT

## 2023-08-24 PROCEDURE — 6360000002 HC RX W HCPCS: Performed by: UROLOGY

## 2023-08-24 PROCEDURE — 90945 DIALYSIS ONE EVALUATION: CPT

## 2023-08-24 PROCEDURE — 2580000003 HC RX 258: Performed by: UROLOGY

## 2023-08-24 PROCEDURE — 74420 UROGRAPHY RTRGR +-KUB: CPT

## 2023-08-24 PROCEDURE — 87205 SMEAR GRAM STAIN: CPT

## 2023-08-24 PROCEDURE — 6370000000 HC RX 637 (ALT 250 FOR IP): Performed by: NURSE PRACTITIONER

## 2023-08-24 PROCEDURE — 3700000001 HC ADD 15 MINUTES (ANESTHESIA): Performed by: UROLOGY

## 2023-08-24 PROCEDURE — 82365 CALCULUS SPECTROSCOPY: CPT

## 2023-08-24 PROCEDURE — 96376 TX/PRO/DX INJ SAME DRUG ADON: CPT

## 2023-08-24 PROCEDURE — 6370000000 HC RX 637 (ALT 250 FOR IP): Performed by: UROLOGY

## 2023-08-24 PROCEDURE — 3700000000 HC ANESTHESIA ATTENDED CARE: Performed by: UROLOGY

## 2023-08-24 PROCEDURE — 0T778DZ DILATION OF LEFT URETER WITH INTRALUMINAL DEVICE, VIA NATURAL OR ARTIFICIAL OPENING ENDOSCOPIC: ICD-10-PCS | Performed by: UROLOGY

## 2023-08-24 PROCEDURE — 1200000000 HC SEMI PRIVATE

## 2023-08-24 PROCEDURE — 90935 HEMODIALYSIS ONE EVALUATION: CPT

## 2023-08-24 PROCEDURE — 36415 COLL VENOUS BLD VENIPUNCTURE: CPT

## 2023-08-24 PROCEDURE — 6360000002 HC RX W HCPCS: Performed by: INTERNAL MEDICINE

## 2023-08-24 PROCEDURE — 3600000004 HC SURGERY LEVEL 4 BASE: Performed by: UROLOGY

## 2023-08-24 PROCEDURE — 7100000000 HC PACU RECOVERY - FIRST 15 MIN: Performed by: UROLOGY

## 2023-08-24 PROCEDURE — 3600000014 HC SURGERY LEVEL 4 ADDTL 15MIN: Performed by: UROLOGY

## 2023-08-24 PROCEDURE — 6370000000 HC RX 637 (ALT 250 FOR IP): Performed by: INTERNAL MEDICINE

## 2023-08-24 PROCEDURE — 84100 ASSAY OF PHOSPHORUS: CPT

## 2023-08-24 PROCEDURE — 2709999900 HC NON-CHARGEABLE SUPPLY: Performed by: UROLOGY

## 2023-08-24 PROCEDURE — 96372 THER/PROPH/DIAG INJ SC/IM: CPT

## 2023-08-24 PROCEDURE — C2617 STENT, NON-COR, TEM W/O DEL: HCPCS | Performed by: UROLOGY

## 2023-08-24 PROCEDURE — 83735 ASSAY OF MAGNESIUM: CPT

## 2023-08-24 PROCEDURE — 2500000003 HC RX 250 WO HCPCS: Performed by: NURSE ANESTHETIST, CERTIFIED REGISTERED

## 2023-08-24 PROCEDURE — 2720000010 HC SURG SUPPLY STERILE: Performed by: UROLOGY

## 2023-08-24 PROCEDURE — 7100000001 HC PACU RECOVERY - ADDTL 15 MIN: Performed by: UROLOGY

## 2023-08-24 PROCEDURE — C1758 CATHETER, URETERAL: HCPCS | Performed by: UROLOGY

## 2023-08-24 PROCEDURE — 2580000003 HC RX 258: Performed by: NURSE ANESTHETIST, CERTIFIED REGISTERED

## 2023-08-24 PROCEDURE — 6360000002 HC RX W HCPCS

## 2023-08-24 PROCEDURE — 89051 BODY FLUID CELL COUNT: CPT

## 2023-08-24 PROCEDURE — C1769 GUIDE WIRE: HCPCS | Performed by: UROLOGY

## 2023-08-24 PROCEDURE — 6360000002 HC RX W HCPCS: Performed by: NURSE ANESTHETIST, CERTIFIED REGISTERED

## 2023-08-24 PROCEDURE — 80048 BASIC METABOLIC PNL TOTAL CA: CPT

## 2023-08-24 DEVICE — URETERAL STENT
Type: IMPLANTABLE DEVICE | Site: URETER | Status: FUNCTIONAL
Brand: CONTOUR™

## 2023-08-24 RX ORDER — SODIUM CHLORIDE 0.9 % (FLUSH) 0.9 %
5-40 SYRINGE (ML) INJECTION EVERY 12 HOURS SCHEDULED
Status: DISCONTINUED | OUTPATIENT
Start: 2023-08-24 | End: 2023-08-24 | Stop reason: HOSPADM

## 2023-08-24 RX ORDER — OXYCODONE HYDROCHLORIDE 5 MG/1
5 TABLET ORAL
Status: DISCONTINUED | OUTPATIENT
Start: 2023-08-24 | End: 2023-08-24 | Stop reason: HOSPADM

## 2023-08-24 RX ORDER — PROCHLORPERAZINE EDISYLATE 5 MG/ML
5 INJECTION INTRAMUSCULAR; INTRAVENOUS
Status: DISCONTINUED | OUTPATIENT
Start: 2023-08-24 | End: 2023-08-24 | Stop reason: HOSPADM

## 2023-08-24 RX ORDER — LIDOCAINE HYDROCHLORIDE 10 MG/ML
1 INJECTION, SOLUTION EPIDURAL; INFILTRATION; INTRACAUDAL; PERINEURAL
Status: DISCONTINUED | OUTPATIENT
Start: 2023-08-24 | End: 2023-08-24 | Stop reason: HOSPADM

## 2023-08-24 RX ORDER — LACTULOSE 10 G/15ML
20 SOLUTION ORAL 3 TIMES DAILY
Status: DISCONTINUED | OUTPATIENT
Start: 2023-08-24 | End: 2023-08-25

## 2023-08-24 RX ORDER — FENTANYL CITRATE 50 UG/ML
25 INJECTION, SOLUTION INTRAMUSCULAR; INTRAVENOUS EVERY 5 MIN PRN
Status: DISCONTINUED | OUTPATIENT
Start: 2023-08-24 | End: 2023-08-24 | Stop reason: HOSPADM

## 2023-08-24 RX ORDER — LABETALOL HYDROCHLORIDE 5 MG/ML
10 INJECTION, SOLUTION INTRAVENOUS
Status: DISCONTINUED | OUTPATIENT
Start: 2023-08-24 | End: 2023-08-24 | Stop reason: HOSPADM

## 2023-08-24 RX ORDER — ONDANSETRON 2 MG/ML
4 INJECTION INTRAMUSCULAR; INTRAVENOUS
Status: DISCONTINUED | OUTPATIENT
Start: 2023-08-24 | End: 2023-08-24 | Stop reason: HOSPADM

## 2023-08-24 RX ORDER — SODIUM CHLORIDE 9 MG/ML
INJECTION, SOLUTION INTRAVENOUS PRN
Status: DISCONTINUED | OUTPATIENT
Start: 2023-08-24 | End: 2023-08-24 | Stop reason: HOSPADM

## 2023-08-24 RX ORDER — MAGNESIUM HYDROXIDE 1200 MG/15ML
LIQUID ORAL
Status: COMPLETED | OUTPATIENT
Start: 2023-08-24 | End: 2023-08-24

## 2023-08-24 RX ORDER — HYDRALAZINE HYDROCHLORIDE 20 MG/ML
10 INJECTION INTRAMUSCULAR; INTRAVENOUS
Status: DISCONTINUED | OUTPATIENT
Start: 2023-08-24 | End: 2023-08-24 | Stop reason: HOSPADM

## 2023-08-24 RX ORDER — PROPOFOL 10 MG/ML
INJECTION, EMULSION INTRAVENOUS PRN
Status: DISCONTINUED | OUTPATIENT
Start: 2023-08-24 | End: 2023-08-24 | Stop reason: SDUPTHER

## 2023-08-24 RX ORDER — ONDANSETRON 2 MG/ML
INJECTION INTRAMUSCULAR; INTRAVENOUS PRN
Status: DISCONTINUED | OUTPATIENT
Start: 2023-08-24 | End: 2023-08-24 | Stop reason: SDUPTHER

## 2023-08-24 RX ORDER — SODIUM CHLORIDE 0.9 % (FLUSH) 0.9 %
5-40 SYRINGE (ML) INJECTION PRN
Status: DISCONTINUED | OUTPATIENT
Start: 2023-08-24 | End: 2023-08-24 | Stop reason: HOSPADM

## 2023-08-24 RX ORDER — LIDOCAINE HYDROCHLORIDE 20 MG/ML
INJECTION, SOLUTION EPIDURAL; INFILTRATION; INTRACAUDAL; PERINEURAL PRN
Status: DISCONTINUED | OUTPATIENT
Start: 2023-08-24 | End: 2023-08-24 | Stop reason: SDUPTHER

## 2023-08-24 RX ORDER — FENTANYL CITRATE 50 UG/ML
INJECTION, SOLUTION INTRAMUSCULAR; INTRAVENOUS PRN
Status: DISCONTINUED | OUTPATIENT
Start: 2023-08-24 | End: 2023-08-24 | Stop reason: SDUPTHER

## 2023-08-24 RX ORDER — SODIUM CHLORIDE 9 MG/ML
INJECTION, SOLUTION INTRAVENOUS CONTINUOUS PRN
Status: DISCONTINUED | OUTPATIENT
Start: 2023-08-24 | End: 2023-08-24 | Stop reason: SDUPTHER

## 2023-08-24 RX ORDER — DEXAMETHASONE SODIUM PHOSPHATE 4 MG/ML
INJECTION, SOLUTION INTRA-ARTICULAR; INTRALESIONAL; INTRAMUSCULAR; INTRAVENOUS; SOFT TISSUE PRN
Status: DISCONTINUED | OUTPATIENT
Start: 2023-08-24 | End: 2023-08-24 | Stop reason: SDUPTHER

## 2023-08-24 RX ORDER — POLYETHYLENE GLYCOL 3350 17 G/17G
17 POWDER, FOR SOLUTION ORAL DAILY
Status: DISCONTINUED | OUTPATIENT
Start: 2023-08-24 | End: 2023-08-26 | Stop reason: HOSPADM

## 2023-08-24 RX ORDER — KETOROLAC TROMETHAMINE 15 MG/ML
15 INJECTION, SOLUTION INTRAMUSCULAR; INTRAVENOUS ONCE
Status: COMPLETED | OUTPATIENT
Start: 2023-08-24 | End: 2023-08-24

## 2023-08-24 RX ORDER — CEFAZOLIN 2 G/1
INJECTION, POWDER, FOR SOLUTION INTRAMUSCULAR; INTRAVENOUS
Status: COMPLETED
Start: 2023-08-24 | End: 2023-08-24

## 2023-08-24 RX ORDER — MIDAZOLAM HYDROCHLORIDE 1 MG/ML
INJECTION INTRAMUSCULAR; INTRAVENOUS PRN
Status: DISCONTINUED | OUTPATIENT
Start: 2023-08-24 | End: 2023-08-24 | Stop reason: SDUPTHER

## 2023-08-24 RX ORDER — HYDROMORPHONE HYDROCHLORIDE 2 MG/ML
0.5 INJECTION, SOLUTION INTRAMUSCULAR; INTRAVENOUS; SUBCUTANEOUS EVERY 5 MIN PRN
Status: DISCONTINUED | OUTPATIENT
Start: 2023-08-24 | End: 2023-08-24 | Stop reason: HOSPADM

## 2023-08-24 RX ORDER — SODIUM CHLORIDE, SODIUM LACTATE, CALCIUM CHLORIDE, MAGNESIUM CHLORIDE AND DEXTROSE 1.5; 538; 448; 18.3; 5.08 G/100ML; MG/100ML; MG/100ML; MG/100ML; MG/100ML
2000 INJECTION, SOLUTION INTRAPERITONEAL ONCE
Status: DISCONTINUED | OUTPATIENT
Start: 2023-08-24 | End: 2023-08-26 | Stop reason: HOSPADM

## 2023-08-24 RX ADMIN — KETOROLAC TROMETHAMINE 15 MG: 15 INJECTION, SOLUTION INTRAMUSCULAR; INTRAVENOUS at 11:38

## 2023-08-24 RX ADMIN — ONDANSETRON 4 MG: 2 INJECTION INTRAMUSCULAR; INTRAVENOUS at 13:37

## 2023-08-24 RX ADMIN — LIDOCAINE HYDROCHLORIDE 80 MG: 20 INJECTION, SOLUTION EPIDURAL; INFILTRATION; INTRACAUDAL; PERINEURAL at 13:41

## 2023-08-24 RX ADMIN — NIFEDIPINE 30 MG: 30 TABLET, EXTENDED RELEASE ORAL at 08:19

## 2023-08-24 RX ADMIN — HYDROMORPHONE HYDROCHLORIDE 0.5 MG: 1 INJECTION, SOLUTION INTRAMUSCULAR; INTRAVENOUS; SUBCUTANEOUS at 00:52

## 2023-08-24 RX ADMIN — PHENYLEPHRINE HYDROCHLORIDE 200 MCG: 10 INJECTION INTRAVENOUS at 14:11

## 2023-08-24 RX ADMIN — INSULIN LISPRO 5 UNITS: 100 INJECTION, SOLUTION INTRAVENOUS; SUBCUTANEOUS at 19:00

## 2023-08-24 RX ADMIN — CLOPIDOGREL BISULFATE 75 MG: 75 TABLET ORAL at 08:19

## 2023-08-24 RX ADMIN — LACTULOSE 20 G: 20 SOLUTION ORAL at 21:16

## 2023-08-24 RX ADMIN — ASPIRIN 81 MG: 81 TABLET, COATED ORAL at 08:20

## 2023-08-24 RX ADMIN — FUROSEMIDE 80 MG: 40 TABLET ORAL at 21:15

## 2023-08-24 RX ADMIN — RANOLAZINE 500 MG: 500 TABLET, FILM COATED, EXTENDED RELEASE ORAL at 08:19

## 2023-08-24 RX ADMIN — HEPARIN SODIUM 5000 UNITS: 5000 INJECTION INTRAVENOUS; SUBCUTANEOUS at 05:33

## 2023-08-24 RX ADMIN — RANOLAZINE 500 MG: 500 TABLET, FILM COATED, EXTENDED RELEASE ORAL at 21:15

## 2023-08-24 RX ADMIN — PHENYLEPHRINE HYDROCHLORIDE 150 MCG: 10 INJECTION INTRAVENOUS at 14:09

## 2023-08-24 RX ADMIN — HYDRALAZINE HYDROCHLORIDE 50 MG: 25 TABLET, FILM COATED ORAL at 08:20

## 2023-08-24 RX ADMIN — INSULIN LISPRO 4 UNITS: 100 INJECTION, SOLUTION INTRAVENOUS; SUBCUTANEOUS at 22:11

## 2023-08-24 RX ADMIN — HEPARIN SODIUM 5000 UNITS: 5000 INJECTION INTRAVENOUS; SUBCUTANEOUS at 21:15

## 2023-08-24 RX ADMIN — HYDRALAZINE HYDROCHLORIDE 50 MG: 25 TABLET, FILM COATED ORAL at 21:15

## 2023-08-24 RX ADMIN — SODIUM CHLORIDE: 9 INJECTION, SOLUTION INTRAVENOUS at 13:33

## 2023-08-24 RX ADMIN — CARVEDILOL 25 MG: 25 TABLET, FILM COATED ORAL at 08:19

## 2023-08-24 RX ADMIN — HYDROMORPHONE HYDROCHLORIDE 0.5 MG: 1 INJECTION, SOLUTION INTRAMUSCULAR; INTRAVENOUS; SUBCUTANEOUS at 06:29

## 2023-08-24 RX ADMIN — PHENYLEPHRINE HYDROCHLORIDE 200 MCG: 10 INJECTION INTRAVENOUS at 14:13

## 2023-08-24 RX ADMIN — CARVEDILOL 25 MG: 25 TABLET, FILM COATED ORAL at 21:15

## 2023-08-24 RX ADMIN — ISOSORBIDE MONONITRATE 30 MG: 30 TABLET, EXTENDED RELEASE ORAL at 08:20

## 2023-08-24 RX ADMIN — SODIUM CHLORIDE, PRESERVATIVE FREE 10 ML: 5 INJECTION INTRAVENOUS at 21:17

## 2023-08-24 RX ADMIN — FENTANYL CITRATE 50 MCG: 50 INJECTION, SOLUTION INTRAMUSCULAR; INTRAVENOUS at 13:39

## 2023-08-24 RX ADMIN — INSULIN LISPRO 8 UNITS: 100 INJECTION, SOLUTION INTRAVENOUS; SUBCUTANEOUS at 19:00

## 2023-08-24 RX ADMIN — INSULIN LISPRO 5 UNITS: 100 INJECTION, SOLUTION INTRAVENOUS; SUBCUTANEOUS at 08:20

## 2023-08-24 RX ADMIN — LACTULOSE 20 G: 20 SOLUTION ORAL at 17:34

## 2023-08-24 RX ADMIN — INSULIN GLARGINE 18 UNITS: 100 INJECTION, SOLUTION SUBCUTANEOUS at 21:14

## 2023-08-24 RX ADMIN — MIDAZOLAM 2 MG: 1 INJECTION INTRAMUSCULAR; INTRAVENOUS at 13:37

## 2023-08-24 RX ADMIN — DEXAMETHASONE SODIUM PHOSPHATE 4 MG: 4 INJECTION, SOLUTION INTRAMUSCULAR; INTRAVENOUS at 13:52

## 2023-08-24 RX ADMIN — FUROSEMIDE 80 MG: 40 TABLET ORAL at 08:19

## 2023-08-24 RX ADMIN — INSULIN LISPRO 6 UNITS: 100 INJECTION, SOLUTION INTRAVENOUS; SUBCUTANEOUS at 08:20

## 2023-08-24 RX ADMIN — PROPOFOL 40 MG: 10 INJECTION, EMULSION INTRAVENOUS at 13:41

## 2023-08-24 RX ADMIN — CEFAZOLIN 2000 MG: 2 INJECTION, POWDER, FOR SOLUTION INTRAMUSCULAR; INTRAVENOUS at 13:25

## 2023-08-24 ASSESSMENT — ENCOUNTER SYMPTOMS: SHORTNESS OF BREATH: 0

## 2023-08-24 ASSESSMENT — PAIN SCALES - GENERAL
PAINLEVEL_OUTOF10: 9
PAINLEVEL_OUTOF10: 0
PAINLEVEL_OUTOF10: 8
PAINLEVEL_OUTOF10: 0
PAINLEVEL_OUTOF10: 8

## 2023-08-24 ASSESSMENT — PAIN - FUNCTIONAL ASSESSMENT: PAIN_FUNCTIONAL_ASSESSMENT: NONE - DENIES PAIN

## 2023-08-24 NOTE — ANESTHESIA PRE PROCEDURE
AM    AST 14 08/22/2023 10:10 AM    ALT 14 08/22/2023 10:10 AM       POC Tests:   Recent Labs     08/24/23  1105   POCGLU 195*       Coags:   Lab Results   Component Value Date/Time    PROTIME 12.8 08/17/2023 12:15 AM    INR 0.96 08/17/2023 12:15 AM    APTT 30.7 12/08/2021 05:19 AM       HCG (If Applicable): No results found for: PREGTESTUR, PREGSERUM, HCG, HCGQUANT     ABGs:   Lab Results   Component Value Date/Time    PHART 7.389 05/26/2010 01:51 AM    PO2ART 137.3 05/26/2010 01:51 AM    GBE2ITY 43.3 05/26/2010 01:51 AM    TUV6HEM 25.6 05/26/2010 01:51 AM    BEART 0.3 05/26/2010 01:51 AM    X7KRRHUH 99.0 05/26/2010 01:51 AM        Type & Screen (If Applicable):  No results found for: LABABO, LABRH    Drug/Infectious Status (If Applicable):  No results found for: HIV, HEPCAB    COVID-19 Screening (If Applicable):   Lab Results   Component Value Date/Time    COVID19 Detected 12/07/2021 02:10 PM    COVID19 NOT DETECTED 08/19/2020 12:15 PM           Anesthesia Evaluation  Patient summary reviewed and Nursing notes reviewed no history of anesthetic complications:   Airway: Mallampati: I  TM distance: >3 FB   Neck ROM: full  Mouth opening: > = 3 FB   Dental: normal exam         Pulmonary:   (+) sleep apnea:      (-) asthma and shortness of breath                           Cardiovascular:    (+) hypertension:, angina: no interval change, past MI:, CAD:, CABG/stent: no interval change, CHF: diastolic, hyperlipidemia               ROS comment: 6/23    Summary   Definity was used to assist with endocardial border delineation. Moderately dilated left ventricular cavity size. Normal left ventricular   wall thickness. Moderately diminished left ventricular systolic function   with an estimated ejection fraction of 35%. Global hypokinesis with regional   variations. No evidence of apical thrombus with Definity. Grade I diastolic dysfunction with normal LV filling pressures.    The right ventricle is normal in size and

## 2023-08-24 NOTE — DISCHARGE INSTRUCTIONS
Ureteral Stent Removal Procedure    You have a ureteral stent in place. There should be a string coming from the urethra and taped in place. On Saturday follow these steps to remove your stent. Most people tolerate doing this at home very well. If there is any concerns or issues please call your urologist office. After you have removed your stent please call your urologist office to inform them of successful stent removal.     1. Wash your hands. 2. Undo the tape which is holding the thin black string. 3. In one continuous gentle motion, pull down and away from your body while holding the string. 4. You will notice a long thin blue tube attached to the string. 5. Throw the stent and string away. 6. Call Dr. Gilberto Harrington at [phone] to inform him of successful removal of the stent.

## 2023-08-24 NOTE — ANESTHESIA POSTPROCEDURE EVALUATION
Department of Anesthesiology  Postprocedure Note    Patient: Wendy Huynh  MRN: [id number]  YOB: 1965  Date of evaluation: 8/24/2023      Procedure Summary     Date: 08/24/23 Room / Location: [address]    Anesthesia Start: 1333 Anesthesia Stop: 1440    Procedure: CYSTOSCOPY, LEFT URETEROSCOPY, STONE BASKET MANIPULATION, PLACEMENT OF LEFT URETERAL STENT (Left) Diagnosis:       Hydronephrosis of left kidney      (Hydronephrosis of left kidney [N13.30])    Surgeons: Sukhwinder Calabrese MD Responsible Provider: Shalini Greene MD    Anesthesia Type: MAC ASA Status: 3          Anesthesia Type: No value filed.     Mary Phase I:      Mary Phase II:        Anesthesia Post Evaluation    Patient location during evaluation: PACU  Patient participation: complete - patient participated  Level of consciousness: awake and alert  Airway patency: patent  Nausea & Vomiting: no nausea and no vomiting  Complications: no  Cardiovascular status: hemodynamically stable  Respiratory status: acceptable  Hydration status: stable  Multimodal analgesia pain management approach  Pain management: adequate

## 2023-08-24 NOTE — CARE COORDINATION
IMM Letter (upgrade)       08/24/23 1137   IMM Letter   IMM Letter given to Patient/Family/Significant other/Guardian/POA/by: Patient   IMM Letter date given: 08/24/23   IMM Letter time given: [address], BSN RN    [facility]  Phone: [phone]

## 2023-08-24 NOTE — OP NOTE
Urology Operative Report  [facility]    Provider: Lizzette Villarreal MD Patient ID:  Admission Date: 2023 Name: May Burroughs Date: 2023  MRN: [id number]   Patient Location: OR/NONE : 1965  Attending: Neal Chandra MD Date of Service: 2023  PCP: Stephan Garcia MD     Date of Operation: 2023    Preoperative Diagnosis: LEFT side distal ureteral stone    Postoperative Diagnosis: same    Procedure:    1. Cystoscopy  2. Left side ureteroscopy  3. Basket extraction of stone  4. Left side ureteral stent placement  5. Fluoroscopic imaging < 1 hr physician time    Surgeon:   Lizzette Villarreal MD    Anesthesia: General LMA anesthesia    Indications: Kerry Hernandez is a 66 y.o. male who presents for the above named surgery. Informed consent was obtained and the risks, benefits, and details of the procedure were explained to the patient who elected to proceed. Details of Procedure: The patient was brought to the operating room and placed in the supine position on the operating room table. SCDs were placed on the lower extremities. Following induction of anesthesia the patient was positioned in a lithotomy position, all pressure points were padded, and the genitals were prepped and draped in the usual sterile fashion. A routine timeout was performed, confirming the patient, procedure, site, risk of fire, patient allergies and confirming that preoperative antibiotics had been administered prior to beginning. A 21 fr rigid cystoscope was advance via a normal appearing urethra into the bladder. The bladder was inspected and there were no suspicious lesions, stones or diverticula seen. Attention was turned to the left ureteral orifice and a 0.035 sensor wire was advance up to the renal pelvis under control of fluoroscopy. A semi-rigid ureteroscope was passed alongside the safety wire and a stone versus blood clot was encountered.  Using a 0-tip nitinol basket the ureteral

## 2023-08-25 ENCOUNTER — TELEPHONE (OUTPATIENT)
Dept: ENDOCRINOLOGY | Age: 58
End: 2023-08-25

## 2023-08-25 DIAGNOSIS — N18.4 TYPE 1 DIABETES MELLITUS WITH STAGE 4 CHRONIC KIDNEY DISEASE (HCC): ICD-10-CM

## 2023-08-25 DIAGNOSIS — E10.22 TYPE 1 DIABETES MELLITUS WITH STAGE 4 CHRONIC KIDNEY DISEASE (HCC): ICD-10-CM

## 2023-08-25 LAB
ANION GAP SERPL CALCULATED.3IONS-SCNC: 15 MMOL/L (ref 3–16)
BUN SERPL-MCNC: 54 MG/DL (ref 7–20)
CALCIUM SERPL-MCNC: 8.8 MG/DL (ref 8.3–10.6)
CHLORIDE SERPL-SCNC: 91 MMOL/L (ref 99–110)
CO2 SERPL-SCNC: 19 MMOL/L (ref 21–32)
CREAT SERPL-MCNC: 9.8 MG/DL (ref 0.9–1.3)
GFR SERPLBLD CREATININE-BSD FMLA CKD-EPI: 6 ML/MIN/{1.73_M2}
GLUCOSE BLD-MCNC: 100 MG/DL (ref 70–99)
GLUCOSE BLD-MCNC: 121 MG/DL (ref 70–99)
GLUCOSE BLD-MCNC: 125 MG/DL (ref 70–99)
GLUCOSE BLD-MCNC: 159 MG/DL (ref 70–99)
GLUCOSE BLD-MCNC: 360 MG/DL (ref 70–99)
GLUCOSE BLD-MCNC: 483 MG/DL (ref 70–99)
GLUCOSE BLD-MCNC: 499 MG/DL (ref 70–99)
GLUCOSE BLD-MCNC: 534 MG/DL (ref 70–99)
GLUCOSE SERPL-MCNC: 581 MG/DL (ref 70–99)
PERFORMED ON: ABNORMAL
POTASSIUM SERPL-SCNC: 5.7 MMOL/L (ref 3.5–5.1)
SODIUM SERPL-SCNC: 125 MMOL/L (ref 136–145)

## 2023-08-25 PROCEDURE — 80048 BASIC METABOLIC PNL TOTAL CA: CPT

## 2023-08-25 PROCEDURE — 90945 DIALYSIS ONE EVALUATION: CPT

## 2023-08-25 PROCEDURE — 6360000002 HC RX W HCPCS: Performed by: UROLOGY

## 2023-08-25 PROCEDURE — 1200000000 HC SEMI PRIVATE

## 2023-08-25 PROCEDURE — 6370000000 HC RX 637 (ALT 250 FOR IP): Performed by: UROLOGY

## 2023-08-25 PROCEDURE — 36415 COLL VENOUS BLD VENIPUNCTURE: CPT

## 2023-08-25 PROCEDURE — 6370000000 HC RX 637 (ALT 250 FOR IP): Performed by: PHYSICIAN ASSISTANT

## 2023-08-25 PROCEDURE — 2580000003 HC RX 258: Performed by: UROLOGY

## 2023-08-25 RX ORDER — LACTULOSE 10 G/15ML
20 SOLUTION ORAL
Status: DISPENSED | OUTPATIENT
Start: 2023-08-25 | End: 2023-08-25

## 2023-08-25 RX ORDER — INSULIN LISPRO 100 [IU]/ML
0-4 INJECTION, SOLUTION INTRAVENOUS; SUBCUTANEOUS NIGHTLY
Status: DISCONTINUED | OUTPATIENT
Start: 2023-08-25 | End: 2023-08-26

## 2023-08-25 RX ORDER — INSULIN LISPRO 100 [IU]/ML
0-16 INJECTION, SOLUTION INTRAVENOUS; SUBCUTANEOUS
Status: DISCONTINUED | OUTPATIENT
Start: 2023-08-25 | End: 2023-08-26

## 2023-08-25 RX ORDER — PROCHLORPERAZINE 25 MG/1
SUPPOSITORY RECTAL
Qty: 1 EACH | Refills: 1 | Status: SHIPPED | OUTPATIENT
Start: 2023-08-25

## 2023-08-25 RX ORDER — INSULIN LISPRO 100 [IU]/ML
10 INJECTION, SOLUTION INTRAVENOUS; SUBCUTANEOUS ONCE
Status: DISCONTINUED | OUTPATIENT
Start: 2023-08-25 | End: 2023-08-25

## 2023-08-25 RX ORDER — INSULIN LISPRO 100 [IU]/ML
15 INJECTION, SOLUTION INTRAVENOUS; SUBCUTANEOUS
Status: DISCONTINUED | OUTPATIENT
Start: 2023-08-25 | End: 2023-08-25

## 2023-08-25 RX ORDER — PROCHLORPERAZINE 25 MG/1
SUPPOSITORY RECTAL
Qty: 9 EACH | Refills: 1 | Status: SHIPPED | OUTPATIENT
Start: 2023-08-25

## 2023-08-25 RX ORDER — INSULIN GLARGINE 100 [IU]/ML
25 INJECTION, SOLUTION SUBCUTANEOUS 2 TIMES DAILY
Status: DISCONTINUED | OUTPATIENT
Start: 2023-08-25 | End: 2023-08-26

## 2023-08-25 RX ORDER — CALCIUM CARBONATE 500 MG/1
500 TABLET, CHEWABLE ORAL DAILY PRN
Status: DISCONTINUED | OUTPATIENT
Start: 2023-08-25 | End: 2023-08-26 | Stop reason: HOSPADM

## 2023-08-25 RX ORDER — INSULIN LISPRO 100 [IU]/ML
10 INJECTION, SOLUTION INTRAVENOUS; SUBCUTANEOUS
Status: DISCONTINUED | OUTPATIENT
Start: 2023-08-26 | End: 2023-08-26

## 2023-08-25 RX ADMIN — INSULIN GLARGINE 25 UNITS: 100 INJECTION, SOLUTION SUBCUTANEOUS at 21:49

## 2023-08-25 RX ADMIN — LACTULOSE 20 G: 20 SOLUTION ORAL at 12:29

## 2023-08-25 RX ADMIN — INSULIN GLARGINE 25 UNITS: 100 INJECTION, SOLUTION SUBCUTANEOUS at 09:55

## 2023-08-25 RX ADMIN — HEPARIN SODIUM 5000 UNITS: 5000 INJECTION INTRAVENOUS; SUBCUTANEOUS at 14:30

## 2023-08-25 RX ADMIN — ANTACID TABLETS 500 MG: 500 TABLET, CHEWABLE ORAL at 00:52

## 2023-08-25 RX ADMIN — INSULIN LISPRO 16 UNITS: 100 INJECTION, SOLUTION INTRAVENOUS; SUBCUTANEOUS at 11:51

## 2023-08-25 RX ADMIN — INSULIN LISPRO 16 UNITS: 100 INJECTION, SOLUTION INTRAVENOUS; SUBCUTANEOUS at 09:51

## 2023-08-25 RX ADMIN — ASPIRIN 81 MG: 81 TABLET, COATED ORAL at 09:54

## 2023-08-25 RX ADMIN — NIFEDIPINE 30 MG: 30 TABLET, EXTENDED RELEASE ORAL at 10:00

## 2023-08-25 RX ADMIN — RANOLAZINE 500 MG: 500 TABLET, FILM COATED, EXTENDED RELEASE ORAL at 09:54

## 2023-08-25 RX ADMIN — CARVEDILOL 25 MG: 25 TABLET, FILM COATED ORAL at 09:55

## 2023-08-25 RX ADMIN — LACTULOSE 20 G: 20 SOLUTION ORAL at 09:54

## 2023-08-25 RX ADMIN — INSULIN LISPRO 15 UNITS: 100 INJECTION, SOLUTION INTRAVENOUS; SUBCUTANEOUS at 11:52

## 2023-08-25 RX ADMIN — POLYETHYLENE GLYCOL 3350 17 G: 17 POWDER, FOR SOLUTION ORAL at 09:54

## 2023-08-25 RX ADMIN — CLOPIDOGREL BISULFATE 75 MG: 75 TABLET ORAL at 09:54

## 2023-08-25 RX ADMIN — HEPARIN SODIUM 5000 UNITS: 5000 INJECTION INTRAVENOUS; SUBCUTANEOUS at 05:11

## 2023-08-25 RX ADMIN — HYDRALAZINE HYDROCHLORIDE 50 MG: 25 TABLET, FILM COATED ORAL at 09:54

## 2023-08-25 RX ADMIN — HEPARIN SODIUM 5000 UNITS: 5000 INJECTION INTRAVENOUS; SUBCUTANEOUS at 21:48

## 2023-08-25 RX ADMIN — RANOLAZINE 500 MG: 500 TABLET, FILM COATED, EXTENDED RELEASE ORAL at 22:09

## 2023-08-25 RX ADMIN — FUROSEMIDE 80 MG: 40 TABLET ORAL at 09:54

## 2023-08-25 RX ADMIN — CEFAZOLIN 2000 MG: 2 INJECTION, POWDER, FOR SOLUTION INTRAMUSCULAR; INTRAVENOUS at 05:10

## 2023-08-25 RX ADMIN — ISOSORBIDE MONONITRATE 30 MG: 30 TABLET, EXTENDED RELEASE ORAL at 09:54

## 2023-08-25 NOTE — ACP (ADVANCE CARE PLANNING)
Advance Care Planning     Advance Care Planning Inpatient Note  [facility] Department    Today's Date: 8/25/2023  Unit[facility] ORTHO/NEURO NURSING    Received request from IDT Member, Diego Cleaning RN. Upon review of chart and communication with care team, patient's decision making abilities are not in question. . Patient was/were present in the room during visit. Goals of ACP Conversation:  Discuss advance care planning documents    Health Care Decision Makers:     No healthcare decision makers have been documented. Click here to complete [address] including selection of the Healthcare Decision Maker Relationship (ie \"Primary\")  Summary:  No Decision Maker named by patient at this time    Advance Care Planning Documents (Patient Wishes):  None     Assessment:  Pastoral Consult to facilitate completion of [address] and Living Will. Patient was awake and alert. Pt was given blank copies of POA and LW documents to review and complete when appropriate. Pt was instructed to notify the nurse to contact [address] to witness pt sign completed POA/LW documents. Provided support through active listening, affirmed feelings, thoughts, concerns, conversation and prayer. Pt expressed mehnaz and gratitude to staff for care and healing. Interventions:  Provided education on documents for clarity and greater understanding  Discussed and provided education on state decision maker hierarchy  Encouraged ongoing ACP conversation with future decision makers and loved ones    Care Preferences Communicated:   No    Outcomes/Plan:  ACP Discussion: Postponed. Pt will notify the norse when appropriate.     Electronically signed by Yadira Wallace on 8/25/2023 at 2:26 PM

## 2023-08-25 NOTE — PLAN OF CARE
Problem: ABCDS Injury Assessment  Goal: Absence of physical injury  Outcome: Progressing     Problem: Discharge Planning  Goal: Discharge to home or other facility with appropriate resources  Outcome: Progressing     Problem: Pain  Goal: Verbalizes/displays adequate comfort level or baseline comfort level  Outcome: Progressing     Problem: Chronic Conditions and Co-morbidities  Goal: Patient's chronic conditions and co-morbidity symptoms are monitored and maintained or improved  Outcome: Progressing     Problem: Safety - Adult  Goal: Free from fall injury  Outcome: Progressing

## 2023-08-25 NOTE — TELEPHONE ENCOUNTER
Pt wife called in requesting refill of    Continuous Blood Gluc Sensor (DEXCOM G6 SENSOR)    Continuous Blood Gluc Transmit (DEXCOM G6 TRANSMITTER)    Sent to  [address] ([facility]) - [address] [phone] Araceli Haro [phone]   [address] [place] [address] Larry   Phone:  [phone]  Fax:  [phone]

## 2023-08-26 VITALS
HEART RATE: 102 BPM | WEIGHT: 191.4 LBS | TEMPERATURE: 98.2 F | DIASTOLIC BLOOD PRESSURE: 88 MMHG | RESPIRATION RATE: 16 BRPM | BODY MASS INDEX: 30.04 KG/M2 | SYSTOLIC BLOOD PRESSURE: 165 MMHG | OXYGEN SATURATION: 100 % | HEIGHT: 67 IN

## 2023-08-26 LAB
ANION GAP SERPL CALCULATED.3IONS-SCNC: 16 MMOL/L (ref 3–16)
BUN SERPL-MCNC: 54 MG/DL (ref 7–20)
CALCIUM SERPL-MCNC: 9.2 MG/DL (ref 8.3–10.6)
CHLORIDE SERPL-SCNC: 94 MMOL/L (ref 99–110)
CO2 SERPL-SCNC: 26 MMOL/L (ref 21–32)
CREAT SERPL-MCNC: 9.8 MG/DL (ref 0.9–1.3)
GFR SERPLBLD CREATININE-BSD FMLA CKD-EPI: 6 ML/MIN/{1.73_M2}
GLUCOSE BLD-MCNC: 205 MG/DL (ref 70–99)
GLUCOSE BLD-MCNC: 231 MG/DL (ref 70–99)
GLUCOSE SERPL-MCNC: 265 MG/DL (ref 70–99)
PERFORMED ON: ABNORMAL
PERFORMED ON: ABNORMAL
POTASSIUM SERPL-SCNC: 4.1 MMOL/L (ref 3.5–5.1)
SODIUM SERPL-SCNC: 136 MMOL/L (ref 136–145)

## 2023-08-26 PROCEDURE — 6370000000 HC RX 637 (ALT 250 FOR IP): Performed by: UROLOGY

## 2023-08-26 PROCEDURE — 6360000002 HC RX W HCPCS: Performed by: UROLOGY

## 2023-08-26 PROCEDURE — 80048 BASIC METABOLIC PNL TOTAL CA: CPT

## 2023-08-26 PROCEDURE — 36415 COLL VENOUS BLD VENIPUNCTURE: CPT

## 2023-08-26 RX ORDER — METOCLOPRAMIDE 5 MG/1
5 TABLET ORAL
Qty: 90 TABLET | Refills: 0 | Status: SHIPPED | OUTPATIENT
Start: 2023-08-26

## 2023-08-26 RX ORDER — HYDRALAZINE HYDROCHLORIDE 100 MG/1
50 TABLET, FILM COATED ORAL 3 TIMES DAILY
Qty: 90 TABLET | Refills: 1
Start: 2023-08-26

## 2023-08-26 RX ADMIN — NIFEDIPINE 30 MG: 30 TABLET, EXTENDED RELEASE ORAL at 08:36

## 2023-08-26 RX ADMIN — RANOLAZINE 500 MG: 500 TABLET, FILM COATED, EXTENDED RELEASE ORAL at 08:38

## 2023-08-26 RX ADMIN — HEPARIN SODIUM 5000 UNITS: 5000 INJECTION INTRAVENOUS; SUBCUTANEOUS at 06:31

## 2023-08-26 RX ADMIN — FUROSEMIDE 80 MG: 40 TABLET ORAL at 08:38

## 2023-08-26 RX ADMIN — HYDRALAZINE HYDROCHLORIDE 50 MG: 25 TABLET, FILM COATED ORAL at 08:38

## 2023-08-26 RX ADMIN — ISOSORBIDE MONONITRATE 30 MG: 30 TABLET, EXTENDED RELEASE ORAL at 08:38

## 2023-08-26 RX ADMIN — CLOPIDOGREL BISULFATE 75 MG: 75 TABLET ORAL at 08:38

## 2023-08-26 RX ADMIN — ASPIRIN 81 MG: 81 TABLET, COATED ORAL at 08:38

## 2023-08-26 RX ADMIN — CARVEDILOL 25 MG: 25 TABLET, FILM COATED ORAL at 08:38

## 2023-08-26 NOTE — CARE COORDINATION
08/26/23 1019   IMM Letter   IMM Letter given to Patient/Family/Significant other/Guardian/POA/by: ROBIN Moreland, LSW   IMM Letter date given: 08/26/23   IMM Letter time given: 80

## 2023-08-27 LAB
APPEARANCE STONE: NORMAL
BACTERIA FLD AEROBE CULT: NORMAL
COMPN STONE: NORMAL
GRAM STN SPEC: NORMAL
SPECIMEN WT: 3 MG

## 2023-08-28 RX ORDER — HYDRALAZINE HYDROCHLORIDE 100 MG/1
TABLET, FILM COATED ORAL
Qty: 270 TABLET | OUTPATIENT
Start: 2023-08-28

## 2023-09-05 RX ORDER — METOCLOPRAMIDE 5 MG/1
TABLET ORAL
Qty: 30 TABLET | Refills: 0 | Status: SHIPPED | OUTPATIENT
Start: 2023-09-05

## 2023-09-05 NOTE — TELEPHONE ENCOUNTER
Medication:   Requested Prescriptions     Pending Prescriptions Disp Refills    metoclopramide (REGLAN) 5 MG tablet [Pharmacy Med Name: METOCLOPRAMIDE  5MG  TAB] 30 tablet 3     Sig: TAKE 1 TABLET BY MOUTH 3 TIMES  DAILY WITH EVERY MEAL AS  DIRECTED       Last Filled:      Patient Phone Number: [phone] (home)     Last appt: 8/21/2023   Next appt: Visit date not found    Last Labs DM:   Lab Results   Component Value Date/Time    LABA1C 8.9 07/05/2023 05:13 AM     Last Lipid:   Lab Results   Component Value Date/Time    CHOL 128 07/05/2023 05:13 AM    TRIG 55 07/05/2023 05:13 AM    HDL 41 07/05/2023 05:13 AM    LDLCALC 76 07/05/2023 05:13 AM     Last PSA: No results found for: PSA  Last Thyroid:   Lab Results   Component Value Date/Time    TSH 1.60 05/01/2020 06:44 AM

## 2023-09-06 ENCOUNTER — CLINICAL DOCUMENTATION ONLY (OUTPATIENT)
Facility: CLINIC | Age: 58
End: 2023-09-06

## 2023-09-12 ENCOUNTER — OFFICE VISIT (OUTPATIENT)
Dept: CARDIOLOGY CLINIC | Age: 58
End: 2023-09-12

## 2023-09-12 VITALS
OXYGEN SATURATION: 97 % | BODY MASS INDEX: 30.84 KG/M2 | HEIGHT: 67 IN | SYSTOLIC BLOOD PRESSURE: 118 MMHG | HEART RATE: 99 BPM | WEIGHT: 196.5 LBS | DIASTOLIC BLOOD PRESSURE: 62 MMHG

## 2023-09-12 DIAGNOSIS — I25.10 CORONARY ARTERY DISEASE DUE TO LIPID RICH PLAQUE: ICD-10-CM

## 2023-09-12 DIAGNOSIS — E78.2 MIXED HYPERLIPIDEMIA: ICD-10-CM

## 2023-09-12 DIAGNOSIS — I10 PRIMARY HYPERTENSION: ICD-10-CM

## 2023-09-12 DIAGNOSIS — I25.5 ISCHEMIC CARDIOMYOPATHY: Primary | ICD-10-CM

## 2023-09-12 DIAGNOSIS — I25.83 CORONARY ARTERY DISEASE DUE TO LIPID RICH PLAQUE: ICD-10-CM

## 2023-09-12 RX ORDER — SACUBITRIL AND VALSARTAN 49; 51 MG/1; MG/1
1 TABLET, FILM COATED ORAL 2 TIMES DAILY
COMMUNITY

## 2023-09-12 NOTE — PROGRESS NOTES
[facility]     Outpatient Follow Up Note    Lenny Soni is 66 y.o. male who presents today with a history of CAD s/p CABG '01, s/p PTCA SVG > OM-1, HTN, CM/EF 35% and hyperlipidemia. His other hx includes: ESRD, CVA, carotid stenosis s/p Rt CEA '16 (Dr. Fabian Pappas), s/p balloon angioplasty SUZANNA April '22    per NE's note: referred to [address] transplant for kidney-pancreas transplant (had been on hold with LVEF < 35%)    Interval hx:   July '23: TIA   presented with dizziness, slurred speech and left sided weakness. MRI was negative for Acute Stroke. Cleared by neurology  Aug '23: flank pain  left flank pain with left hydronephrosis secondary to past numbers of stricture patient repeat ultrasound which was stable did not show any hydronephrosis cleared for discharge by urology   Aug '23: recurrent Lt flank pain  recurrent severe left flank pain. His symptoms started approximately 1 week ago. He has had 2 previous CAT scans revealing mild hydronephrosis with no obvious stone seen. He is having colicky pain in the left flank which is severe at times. Repeat CT scan in the emergency room once again revealed mild left hydronephrosis and hydroureter. He was taken to the OR for cystoscopy and ureteroscopy where he was found to have stone versus blood clots. He had a ureteral stenting with resolution of his pain. CHIEF COMPLAINT / HPI:  Follow Up secondary to CM & CAD    Subjective:     He misunderstood directions with hydralazine (when discharged from the hospital in Aug)  and continued to take 100 mg tid. He'd seen nephrology who was holding nifedipine and lowered HDLZ dose. His BP was getting low and causing him to feel light headed. The lowest he saw was 92/57. He just now discovered his error when reviewing medications today    He denies chest discomfort. He has no SOB. He walked on the treadmill yesterday at the gym and did ok. He's mentally afraid though.  He would be out walking in the park and

## 2023-09-21 DIAGNOSIS — I25.83 CORONARY ARTERY DISEASE DUE TO LIPID RICH PLAQUE: ICD-10-CM

## 2023-09-21 DIAGNOSIS — I25.10 CORONARY ARTERY DISEASE DUE TO LIPID RICH PLAQUE: ICD-10-CM

## 2023-09-21 RX ORDER — SACUBITRIL AND VALSARTAN 49; 51 MG/1; MG/1
1 TABLET, FILM COATED ORAL 2 TIMES DAILY
Qty: 90 TABLET | Refills: 2 | Status: SHIPPED | OUTPATIENT
Start: 2023-09-21

## 2023-09-21 RX ORDER — RANOLAZINE 500 MG/1
TABLET, EXTENDED RELEASE ORAL
Qty: 180 TABLET | Refills: 2 | Status: SHIPPED | OUTPATIENT
Start: 2023-09-21

## 2023-09-21 RX ORDER — HYDRALAZINE HYDROCHLORIDE 50 MG/1
50 TABLET, FILM COATED ORAL 3 TIMES DAILY
Qty: 270 TABLET | Refills: 1 | Status: SHIPPED | OUTPATIENT
Start: 2023-09-21

## 2023-09-21 RX ORDER — ISOSORBIDE MONONITRATE 30 MG/1
30 TABLET, EXTENDED RELEASE ORAL DAILY
Qty: 90 TABLET | Refills: 2 | Status: SHIPPED | OUTPATIENT
Start: 2023-09-21

## 2023-10-03 ENCOUNTER — TELEPHONE (OUTPATIENT)
Dept: CARDIOLOGY CLINIC | Age: 58
End: 2023-10-03

## 2023-10-03 DIAGNOSIS — I25.10 CORONARY ARTERY DISEASE DUE TO LIPID RICH PLAQUE: ICD-10-CM

## 2023-10-03 DIAGNOSIS — Z01.818 PRE-OP TESTING: Primary | ICD-10-CM

## 2023-10-03 DIAGNOSIS — I25.83 CORONARY ARTERY DISEASE DUE TO LIPID RICH PLAQUE: ICD-10-CM

## 2023-10-03 NOTE — TELEPHONE ENCOUNTER
Pt dropped off Cardiac Clearance form to be filled for kidney and pancreas transplant. Form is in [employer]. Pt thinks he may need stress test first, but will wait to hear from someone. Please advise. Thank you.

## 2023-10-03 NOTE — TELEPHONE ENCOUNTER
Yes, he will need a stress test. The echo was in June 2023 so if the surgery is after June 2024 we will need to update that as well. Please give him the number to schedule stress. Primary Defect Length In Cm (Final Defect Size - Required For Flaps/Grafts): 0.8

## 2023-10-09 ENCOUNTER — HOSPITAL ENCOUNTER (OUTPATIENT)
Dept: NON INVASIVE DIAGNOSTICS | Age: 58
Discharge: HOME | End: 2023-10-09

## 2023-10-12 ENCOUNTER — TELEPHONE (OUTPATIENT)
Dept: CARDIOLOGY CLINIC | Age: 58
End: 2023-10-12

## 2023-10-12 NOTE — TELEPHONE ENCOUNTER
The past several days, his BP has been mid-100s/    He's been holding hydralazine and held isosorbide    Inst to hold procardia in addition to the hydralazine & isosorbide; he'll call on Monday to let us know how he feels

## 2023-10-12 NOTE — TELEPHONE ENCOUNTER
Pt called to inform the office that his bp is running low:    80/68       114     10/12     2:30pm  105/78       94     10/12     2:35pm - sitting down  113/82                 10/12    3:20pm    Pt was feeling lightheadiness, dizzy, unsteady on his feet had to be put in a wheelchair at his kidney office today when his bp was low. Please call to discuss his med change are what is needed to be done.   Thank you

## 2023-10-16 ENCOUNTER — ANESTHESIA EVENT (OUTPATIENT)
Dept: OPERATING ROOM | Age: 58
End: 2023-10-16
Payer: MEDICARE

## 2023-10-16 ENCOUNTER — APPOINTMENT (OUTPATIENT)
Dept: GENERAL RADIOLOGY | Age: 58
DRG: 622 | End: 2023-10-16
Payer: MEDICARE

## 2023-10-16 ENCOUNTER — ANESTHESIA (OUTPATIENT)
Dept: OPERATING ROOM | Age: 58
End: 2023-10-16
Payer: MEDICARE

## 2023-10-16 ENCOUNTER — HOSPITAL ENCOUNTER (INPATIENT)
Age: 58
LOS: 8 days | Discharge: HOME HEALTH | DRG: 622 | End: 2023-10-24
Attending: HOSPITALIST | Admitting: HOSPITALIST
Payer: MEDICARE

## 2023-10-16 DIAGNOSIS — L97.401 DIABETIC ULCER OF MIDFOOT ASSOCIATED WITH DIABETES MELLITUS DUE TO UNDERLYING CONDITION, LIMITED TO BREAKDOWN OF SKIN, UNSPECIFIED LATERALITY (HCC): Primary | ICD-10-CM

## 2023-10-16 DIAGNOSIS — E08.621 DIABETIC ULCER OF MIDFOOT ASSOCIATED WITH DIABETES MELLITUS DUE TO UNDERLYING CONDITION, LIMITED TO BREAKDOWN OF SKIN, UNSPECIFIED LATERALITY (HCC): Primary | ICD-10-CM

## 2023-10-16 DIAGNOSIS — E13.621 DIABETIC ULCER OF LEFT FOOT ASSOCIATED WITH OTHER SPECIFIED DIABETES MELLITUS, UNSPECIFIED PART OF FOOT, UNSPECIFIED ULCER STAGE (HCC): ICD-10-CM

## 2023-10-16 DIAGNOSIS — L97.529 DIABETIC ULCER OF LEFT FOOT ASSOCIATED WITH OTHER SPECIFIED DIABETES MELLITUS, UNSPECIFIED PART OF FOOT, UNSPECIFIED ULCER STAGE (HCC): ICD-10-CM

## 2023-10-16 PROBLEM — E66.811 CLASS 1 OBESITY DUE TO EXCESS CALORIES WITH BODY MASS INDEX (BMI) OF 30.0 TO 30.9 IN ADULT: Status: ACTIVE | Noted: 2023-10-16

## 2023-10-16 PROBLEM — L08.9 LEFT FOOT INFECTION: Status: ACTIVE | Noted: 2023-10-16

## 2023-10-16 PROBLEM — E66.09 CLASS 1 OBESITY DUE TO EXCESS CALORIES WITH BODY MASS INDEX (BMI) OF 30.0 TO 30.9 IN ADULT: Status: ACTIVE | Noted: 2023-10-16

## 2023-10-16 PROBLEM — I48.0 PAF (PAROXYSMAL ATRIAL FIBRILLATION) (HCC): Status: ACTIVE | Noted: 2023-10-16

## 2023-10-16 LAB
ALBUMIN SERPL-MCNC: 3.7 G/DL (ref 3.4–5)
ALBUMIN/GLOB SERPL: 1.2 {RATIO} (ref 1.1–2.2)
ALP SERPL-CCNC: 90 U/L (ref 40–129)
ALT SERPL-CCNC: 7 U/L (ref 10–40)
ANION GAP SERPL CALCULATED.3IONS-SCNC: 13 MMOL/L (ref 3–16)
AST SERPL-CCNC: 9 U/L (ref 15–37)
BASOPHILS # BLD: 0 K/UL (ref 0–0.2)
BASOPHILS NFR BLD: 0.3 %
BILIRUB SERPL-MCNC: 0.3 MG/DL (ref 0–1)
BUN SERPL-MCNC: 52 MG/DL (ref 7–20)
CALCIUM SERPL-MCNC: 9.1 MG/DL (ref 8.3–10.6)
CHLORIDE SERPL-SCNC: 100 MMOL/L (ref 99–110)
CO2 SERPL-SCNC: 27 MMOL/L (ref 21–32)
CREAT SERPL-MCNC: 8.1 MG/DL (ref 0.9–1.3)
CRP SERPL-MCNC: 42.6 MG/L (ref 0–5.1)
DEPRECATED RDW RBC AUTO: 15.2 % (ref 12.4–15.4)
EKG ATRIAL RATE: 82 BPM
EKG DIAGNOSIS: NORMAL
EKG P AXIS: 58 DEGREES
EKG P-R INTERVAL: 138 MS
EKG Q-T INTERVAL: 404 MS
EKG QRS DURATION: 102 MS
EKG QTC CALCULATION (BAZETT): 472 MS
EKG R AXIS: 24 DEGREES
EKG T AXIS: 69 DEGREES
EKG VENTRICULAR RATE: 82 BPM
EOSINOPHIL # BLD: 0.2 K/UL (ref 0–0.6)
EOSINOPHIL NFR BLD: 2.6 %
ERYTHROCYTE [SEDIMENTATION RATE] IN BLOOD BY WESTERGREN METHOD: 60 MM/HR (ref 0–20)
GFR SERPLBLD CREATININE-BSD FMLA CKD-EPI: 7 ML/MIN/{1.73_M2}
GLUCOSE BLD-MCNC: 209 MG/DL (ref 70–99)
GLUCOSE BLD-MCNC: 90 MG/DL (ref 70–99)
GLUCOSE BLD-MCNC: 91 MG/DL (ref 70–99)
GLUCOSE BLD-MCNC: 98 MG/DL (ref 70–99)
GLUCOSE SERPL-MCNC: 73 MG/DL (ref 70–99)
HCT VFR BLD AUTO: 43.7 % (ref 40.5–52.5)
HGB BLD-MCNC: 14.1 G/DL (ref 13.5–17.5)
INR PPP: 0.98 (ref 0.84–1.16)
LACTATE BLDV-SCNC: 0.7 MMOL/L (ref 0.4–2)
LYMPHOCYTES # BLD: 1.6 K/UL (ref 1–5.1)
LYMPHOCYTES NFR BLD: 25.2 %
MCH RBC QN AUTO: 29.5 PG (ref 26–34)
MCHC RBC AUTO-ENTMCNC: 32.3 G/DL (ref 31–36)
MCV RBC AUTO: 91.3 FL (ref 80–100)
MONOCYTES # BLD: 0.8 K/UL (ref 0–1.3)
MONOCYTES NFR BLD: 12.7 %
NEUTROPHILS # BLD: 3.9 K/UL (ref 1.7–7.7)
NEUTROPHILS NFR BLD: 59.2 %
PERFORMED ON: ABNORMAL
PERFORMED ON: NORMAL
PLATELET # BLD AUTO: 158 K/UL (ref 135–450)
PMV BLD AUTO: 10.4 FL (ref 5–10.5)
POTASSIUM SERPL-SCNC: 3.7 MMOL/L (ref 3.5–5.1)
PROT SERPL-MCNC: 6.9 G/DL (ref 6.4–8.2)
PROTHROMBIN TIME: 13 SEC (ref 11.5–14.8)
RBC # BLD AUTO: 4.78 M/UL (ref 4.2–5.9)
SODIUM SERPL-SCNC: 140 MMOL/L (ref 136–145)
WBC # BLD AUTO: 6.5 K/UL (ref 4–11)

## 2023-10-16 PROCEDURE — 2580000003 HC RX 258: Performed by: PHYSICIAN ASSISTANT

## 2023-10-16 PROCEDURE — 71046 X-RAY EXAM CHEST 2 VIEWS: CPT

## 2023-10-16 PROCEDURE — 2580000003 HC RX 258: Performed by: PODIATRIST

## 2023-10-16 PROCEDURE — 85610 PROTHROMBIN TIME: CPT

## 2023-10-16 PROCEDURE — 88307 TISSUE EXAM BY PATHOLOGIST: CPT

## 2023-10-16 PROCEDURE — 6370000000 HC RX 637 (ALT 250 FOR IP): Performed by: HOSPITALIST

## 2023-10-16 PROCEDURE — 99285 EMERGENCY DEPT VISIT HI MDM: CPT

## 2023-10-16 PROCEDURE — 88311 DECALCIFY TISSUE: CPT

## 2023-10-16 PROCEDURE — 6360000002 HC RX W HCPCS: Performed by: PHYSICIAN ASSISTANT

## 2023-10-16 PROCEDURE — 93010 ELECTROCARDIOGRAM REPORT: CPT | Performed by: INTERNAL MEDICINE

## 2023-10-16 PROCEDURE — 0JBR0ZZ EXCISION OF LEFT FOOT SUBCUTANEOUS TISSUE AND FASCIA, OPEN APPROACH: ICD-10-PCS | Performed by: PODIATRIST

## 2023-10-16 PROCEDURE — 0QBP0ZX EXCISION OF LEFT METATARSAL, OPEN APPROACH, DIAGNOSTIC: ICD-10-PCS | Performed by: PODIATRIST

## 2023-10-16 PROCEDURE — 96365 THER/PROPH/DIAG IV INF INIT: CPT

## 2023-10-16 PROCEDURE — 83036 HEMOGLOBIN GLYCOSYLATED A1C: CPT

## 2023-10-16 PROCEDURE — 2580000003 HC RX 258: Performed by: INTERNAL MEDICINE

## 2023-10-16 PROCEDURE — 96367 TX/PROPH/DG ADDL SEQ IV INF: CPT

## 2023-10-16 PROCEDURE — 7100000000 HC PACU RECOVERY - FIRST 15 MIN: Performed by: PODIATRIST

## 2023-10-16 PROCEDURE — 87040 BLOOD CULTURE FOR BACTERIA: CPT

## 2023-10-16 PROCEDURE — 6360000002 HC RX W HCPCS: Performed by: INTERNAL MEDICINE

## 2023-10-16 PROCEDURE — 7100000001 HC PACU RECOVERY - ADDTL 15 MIN: Performed by: PODIATRIST

## 2023-10-16 PROCEDURE — 86140 C-REACTIVE PROTEIN: CPT

## 2023-10-16 PROCEDURE — 99223 1ST HOSP IP/OBS HIGH 75: CPT | Performed by: INTERNAL MEDICINE

## 2023-10-16 PROCEDURE — 1200000000 HC SEMI PRIVATE

## 2023-10-16 PROCEDURE — 90945 DIALYSIS ONE EVALUATION: CPT

## 2023-10-16 PROCEDURE — 3600000002 HC SURGERY LEVEL 2 BASE: Performed by: PODIATRIST

## 2023-10-16 PROCEDURE — 80053 COMPREHEN METABOLIC PANEL: CPT

## 2023-10-16 PROCEDURE — 3E1M39Z IRRIGATION OF PERITONEAL CAVITY USING DIALYSATE, PERCUTANEOUS APPROACH: ICD-10-PCS | Performed by: HOSPITALIST

## 2023-10-16 PROCEDURE — 85025 COMPLETE CBC W/AUTO DIFF WBC: CPT

## 2023-10-16 PROCEDURE — 73630 X-RAY EXAM OF FOOT: CPT

## 2023-10-16 PROCEDURE — 87076 CULTURE ANAEROBE IDENT EACH: CPT

## 2023-10-16 PROCEDURE — 85652 RBC SED RATE AUTOMATED: CPT

## 2023-10-16 PROCEDURE — 2500000003 HC RX 250 WO HCPCS: Performed by: PODIATRIST

## 2023-10-16 PROCEDURE — 2709999900 HC NON-CHARGEABLE SUPPLY: Performed by: PODIATRIST

## 2023-10-16 PROCEDURE — 83605 ASSAY OF LACTIC ACID: CPT

## 2023-10-16 PROCEDURE — 87070 CULTURE OTHR SPECIMN AEROBIC: CPT

## 2023-10-16 PROCEDURE — 6360000002 HC RX W HCPCS: Performed by: NURSE ANESTHETIST, CERTIFIED REGISTERED

## 2023-10-16 PROCEDURE — 6360000002 HC RX W HCPCS: Performed by: HOSPITALIST

## 2023-10-16 PROCEDURE — 2500000003 HC RX 250 WO HCPCS: Performed by: NURSE ANESTHETIST, CERTIFIED REGISTERED

## 2023-10-16 PROCEDURE — 87075 CULTR BACTERIA EXCEPT BLOOD: CPT

## 2023-10-16 PROCEDURE — 36415 COLL VENOUS BLD VENIPUNCTURE: CPT

## 2023-10-16 PROCEDURE — 3700000000 HC ANESTHESIA ATTENDED CARE: Performed by: PODIATRIST

## 2023-10-16 PROCEDURE — 6370000000 HC RX 637 (ALT 250 FOR IP): Performed by: INTERNAL MEDICINE

## 2023-10-16 PROCEDURE — 3600000012 HC SURGERY LEVEL 2 ADDTL 15MIN: Performed by: PODIATRIST

## 2023-10-16 PROCEDURE — 87205 SMEAR GRAM STAIN: CPT

## 2023-10-16 PROCEDURE — 87102 FUNGUS ISOLATION CULTURE: CPT

## 2023-10-16 PROCEDURE — 6360000002 HC RX W HCPCS: Performed by: PODIATRIST

## 2023-10-16 PROCEDURE — 3700000001 HC ADD 15 MINUTES (ANESTHESIA): Performed by: PODIATRIST

## 2023-10-16 PROCEDURE — 87077 CULTURE AEROBIC IDENTIFY: CPT

## 2023-10-16 PROCEDURE — 93005 ELECTROCARDIOGRAM TRACING: CPT

## 2023-10-16 PROCEDURE — 6370000000 HC RX 637 (ALT 250 FOR IP): Performed by: PHYSICIAN ASSISTANT

## 2023-10-16 RX ORDER — OXYCODONE HYDROCHLORIDE 5 MG/1
5 TABLET ORAL EVERY 4 HOURS PRN
Status: DISCONTINUED | OUTPATIENT
Start: 2023-10-16 | End: 2023-10-24 | Stop reason: HOSPADM

## 2023-10-16 RX ORDER — BUPIVACAINE HYDROCHLORIDE 5 MG/ML
INJECTION, SOLUTION EPIDURAL; INTRACAUDAL
Status: COMPLETED | OUTPATIENT
Start: 2023-10-16 | End: 2023-10-16

## 2023-10-16 RX ORDER — ISOSORBIDE MONONITRATE 30 MG/1
30 TABLET, EXTENDED RELEASE ORAL DAILY
Status: DISCONTINUED | OUTPATIENT
Start: 2023-10-17 | End: 2023-10-24 | Stop reason: HOSPADM

## 2023-10-16 RX ORDER — 0.9 % SODIUM CHLORIDE 0.9 %
1000 INTRAVENOUS SOLUTION INTRAVENOUS ONCE
Status: COMPLETED | OUTPATIENT
Start: 2023-10-16 | End: 2023-10-16

## 2023-10-16 RX ORDER — GLUCAGON 1 MG/ML
1 KIT INJECTION PRN
Status: DISCONTINUED | OUTPATIENT
Start: 2023-10-16 | End: 2023-10-16 | Stop reason: RX

## 2023-10-16 RX ORDER — ONDANSETRON 4 MG/1
4 TABLET, ORALLY DISINTEGRATING ORAL EVERY 8 HOURS PRN
Status: DISCONTINUED | OUTPATIENT
Start: 2023-10-16 | End: 2023-10-24 | Stop reason: HOSPADM

## 2023-10-16 RX ORDER — CLOPIDOGREL BISULFATE 75 MG/1
75 TABLET ORAL DAILY
Status: COMPLETED | OUTPATIENT
Start: 2023-10-17 | End: 2023-10-22

## 2023-10-16 RX ORDER — SODIUM CHLORIDE 9 MG/ML
INJECTION, SOLUTION INTRAVENOUS PRN
Status: DISCONTINUED | OUTPATIENT
Start: 2023-10-16 | End: 2023-10-24 | Stop reason: HOSPADM

## 2023-10-16 RX ORDER — ACETAMINOPHEN 325 MG/1
650 TABLET ORAL EVERY 6 HOURS PRN
Status: DISCONTINUED | OUTPATIENT
Start: 2023-10-16 | End: 2023-10-24 | Stop reason: HOSPADM

## 2023-10-16 RX ORDER — PROPOFOL 10 MG/ML
INJECTION, EMULSION INTRAVENOUS CONTINUOUS PRN
Status: DISCONTINUED | OUTPATIENT
Start: 2023-10-16 | End: 2023-10-16 | Stop reason: SDUPTHER

## 2023-10-16 RX ORDER — SODIUM CHLORIDE 0.9 % (FLUSH) 0.9 %
5-40 SYRINGE (ML) INJECTION EVERY 12 HOURS SCHEDULED
Status: DISCONTINUED | OUTPATIENT
Start: 2023-10-16 | End: 2023-10-24 | Stop reason: HOSPADM

## 2023-10-16 RX ORDER — CALCITRIOL 0.25 UG/1
0.25 CAPSULE, LIQUID FILLED ORAL 2 TIMES DAILY
Status: DISCONTINUED | OUTPATIENT
Start: 2023-10-16 | End: 2023-10-24 | Stop reason: HOSPADM

## 2023-10-16 RX ORDER — ACETAMINOPHEN 500 MG
1000 TABLET ORAL ONCE
Status: COMPLETED | OUTPATIENT
Start: 2023-10-16 | End: 2023-10-16

## 2023-10-16 RX ORDER — SODIUM CHLORIDE 9 MG/ML
INJECTION, SOLUTION INTRAVENOUS CONTINUOUS
Status: DISCONTINUED | OUTPATIENT
Start: 2023-10-16 | End: 2023-10-16 | Stop reason: HOSPADM

## 2023-10-16 RX ORDER — SODIUM CHLORIDE 0.9 % (FLUSH) 0.9 %
5-40 SYRINGE (ML) INJECTION EVERY 12 HOURS SCHEDULED
Status: DISCONTINUED | OUTPATIENT
Start: 2023-10-16 | End: 2023-10-16 | Stop reason: HOSPADM

## 2023-10-16 RX ORDER — HEPARIN SODIUM 5000 [USP'U]/ML
5000 INJECTION, SOLUTION INTRAVENOUS; SUBCUTANEOUS EVERY 8 HOURS SCHEDULED
Status: DISCONTINUED | OUTPATIENT
Start: 2023-10-16 | End: 2023-10-17

## 2023-10-16 RX ORDER — ONDANSETRON 2 MG/ML
4 INJECTION INTRAMUSCULAR; INTRAVENOUS EVERY 6 HOURS PRN
Status: DISCONTINUED | OUTPATIENT
Start: 2023-10-16 | End: 2023-10-24 | Stop reason: HOSPADM

## 2023-10-16 RX ORDER — INSULIN LISPRO 100 [IU]/ML
0-8 INJECTION, SOLUTION INTRAVENOUS; SUBCUTANEOUS
Status: DISCONTINUED | OUTPATIENT
Start: 2023-10-16 | End: 2023-10-17

## 2023-10-16 RX ORDER — HYDROMORPHONE HYDROCHLORIDE 2 MG/ML
0.5 INJECTION, SOLUTION INTRAMUSCULAR; INTRAVENOUS; SUBCUTANEOUS EVERY 5 MIN PRN
Status: DISCONTINUED | OUTPATIENT
Start: 2023-10-16 | End: 2023-10-16 | Stop reason: HOSPADM

## 2023-10-16 RX ORDER — LIDOCAINE HYDROCHLORIDE 10 MG/ML
INJECTION, SOLUTION EPIDURAL; INFILTRATION; INTRACAUDAL; PERINEURAL
Status: COMPLETED | OUTPATIENT
Start: 2023-10-16 | End: 2023-10-16

## 2023-10-16 RX ORDER — SODIUM CHLORIDE, SODIUM LACTATE, CALCIUM CHLORIDE, MAGNESIUM CHLORIDE AND DEXTROSE 1.5; 538; 448; 18.3; 5.08 G/100ML; MG/100ML; MG/100ML; MG/100ML; MG/100ML
2500 INJECTION, SOLUTION INTRAPERITONEAL CONTINUOUS
Status: DISCONTINUED | OUTPATIENT
Start: 2023-10-16 | End: 2023-10-20

## 2023-10-16 RX ORDER — INSULIN LISPRO 100 [IU]/ML
0-4 INJECTION, SOLUTION INTRAVENOUS; SUBCUTANEOUS NIGHTLY
Status: DISCONTINUED | OUTPATIENT
Start: 2023-10-16 | End: 2023-10-17

## 2023-10-16 RX ORDER — CARVEDILOL 25 MG/1
25 TABLET ORAL 2 TIMES DAILY WITH MEALS
Status: DISCONTINUED | OUTPATIENT
Start: 2023-10-16 | End: 2023-10-21

## 2023-10-16 RX ORDER — RANOLAZINE 500 MG/1
500 TABLET, EXTENDED RELEASE ORAL 2 TIMES DAILY
Status: DISCONTINUED | OUTPATIENT
Start: 2023-10-16 | End: 2023-10-24 | Stop reason: HOSPADM

## 2023-10-16 RX ORDER — SODIUM CHLORIDE 0.9 % (FLUSH) 0.9 %
5-40 SYRINGE (ML) INJECTION PRN
Status: DISCONTINUED | OUTPATIENT
Start: 2023-10-16 | End: 2023-10-24 | Stop reason: HOSPADM

## 2023-10-16 RX ORDER — MORPHINE SULFATE 2 MG/ML
2 INJECTION, SOLUTION INTRAMUSCULAR; INTRAVENOUS EVERY 4 HOURS PRN
Status: DISCONTINUED | OUTPATIENT
Start: 2023-10-16 | End: 2023-10-24 | Stop reason: HOSPADM

## 2023-10-16 RX ORDER — ASPIRIN 81 MG/1
81 TABLET ORAL DAILY
Status: COMPLETED | OUTPATIENT
Start: 2023-10-17 | End: 2023-10-22

## 2023-10-16 RX ORDER — DEXTROSE MONOHYDRATE 100 MG/ML
INJECTION, SOLUTION INTRAVENOUS CONTINUOUS PRN
Status: DISCONTINUED | OUTPATIENT
Start: 2023-10-16 | End: 2023-10-24 | Stop reason: HOSPADM

## 2023-10-16 RX ORDER — OXYCODONE HYDROCHLORIDE AND ACETAMINOPHEN 5; 325 MG/1; MG/1
1 TABLET ORAL EVERY 4 HOURS PRN
Status: DISCONTINUED | OUTPATIENT
Start: 2023-10-16 | End: 2023-10-18 | Stop reason: ALTCHOICE

## 2023-10-16 RX ORDER — OXYCODONE HYDROCHLORIDE 5 MG/1
10 TABLET ORAL EVERY 4 HOURS PRN
Status: DISCONTINUED | OUTPATIENT
Start: 2023-10-16 | End: 2023-10-24 | Stop reason: HOSPADM

## 2023-10-16 RX ORDER — LACTULOSE 10 G/15ML
30 SOLUTION ORAL 3 TIMES DAILY PRN
COMMUNITY
Start: 2023-07-13

## 2023-10-16 RX ORDER — SODIUM CHLORIDE 9 MG/ML
INJECTION, SOLUTION INTRAVENOUS PRN
Status: DISCONTINUED | OUTPATIENT
Start: 2023-10-16 | End: 2023-10-16 | Stop reason: HOSPADM

## 2023-10-16 RX ORDER — SODIUM CHLORIDE 0.9 % (FLUSH) 0.9 %
5-40 SYRINGE (ML) INJECTION PRN
Status: DISCONTINUED | OUTPATIENT
Start: 2023-10-16 | End: 2023-10-16 | Stop reason: HOSPADM

## 2023-10-16 RX ORDER — HYDRALAZINE HYDROCHLORIDE 25 MG/1
100 TABLET, FILM COATED ORAL 3 TIMES DAILY
Status: DISCONTINUED | OUTPATIENT
Start: 2023-10-16 | End: 2023-10-16

## 2023-10-16 RX ORDER — LIDOCAINE HYDROCHLORIDE 20 MG/ML
INJECTION, SOLUTION INFILTRATION; PERINEURAL PRN
Status: DISCONTINUED | OUTPATIENT
Start: 2023-10-16 | End: 2023-10-16 | Stop reason: SDUPTHER

## 2023-10-16 RX ORDER — LACTOBACILLUS RHAMNOSUS GG 10B CELL
1 CAPSULE ORAL 2 TIMES DAILY WITH MEALS
Status: DISCONTINUED | OUTPATIENT
Start: 2023-10-16 | End: 2023-10-24 | Stop reason: HOSPADM

## 2023-10-16 RX ORDER — POLYETHYLENE GLYCOL 3350 17 G/17G
17 POWDER, FOR SOLUTION ORAL DAILY PRN
Status: DISCONTINUED | OUTPATIENT
Start: 2023-10-16 | End: 2023-10-24 | Stop reason: HOSPADM

## 2023-10-16 RX ORDER — ONDANSETRON 2 MG/ML
4 INJECTION INTRAMUSCULAR; INTRAVENOUS
Status: DISCONTINUED | OUTPATIENT
Start: 2023-10-16 | End: 2023-10-16 | Stop reason: HOSPADM

## 2023-10-16 RX ORDER — ACETAMINOPHEN 650 MG/1
650 SUPPOSITORY RECTAL EVERY 6 HOURS PRN
Status: DISCONTINUED | OUTPATIENT
Start: 2023-10-16 | End: 2023-10-24 | Stop reason: HOSPADM

## 2023-10-16 RX ORDER — FUROSEMIDE 40 MG/1
80 TABLET ORAL 2 TIMES DAILY
Status: DISCONTINUED | OUTPATIENT
Start: 2023-10-16 | End: 2023-10-21

## 2023-10-16 RX ORDER — EZETIMIBE 10 MG/1
10 TABLET ORAL DAILY
Status: DISCONTINUED | OUTPATIENT
Start: 2023-10-16 | End: 2023-10-16 | Stop reason: RX

## 2023-10-16 RX ORDER — NIFEDIPINE 30 MG/1
30 TABLET, EXTENDED RELEASE ORAL DAILY
Status: DISCONTINUED | OUTPATIENT
Start: 2023-10-16 | End: 2023-10-16

## 2023-10-16 RX ORDER — ROSUVASTATIN CALCIUM 10 MG/1
10 TABLET, COATED ORAL NIGHTLY
Status: DISCONTINUED | OUTPATIENT
Start: 2023-10-16 | End: 2023-10-24 | Stop reason: HOSPADM

## 2023-10-16 RX ADMIN — Medication 1 CAPSULE: at 17:53

## 2023-10-16 RX ADMIN — RANOLAZINE 500 MG: 500 TABLET, EXTENDED RELEASE ORAL at 20:27

## 2023-10-16 RX ADMIN — MEROPENEM 500 MG: 500 INJECTION, POWDER, FOR SOLUTION INTRAVENOUS at 17:54

## 2023-10-16 RX ADMIN — SODIUM CHLORIDE: 9 INJECTION, SOLUTION INTRAVENOUS at 14:14

## 2023-10-16 RX ADMIN — SACUBITRIL AND VALSARTAN 1 TABLET: 49; 51 TABLET, FILM COATED ORAL at 20:27

## 2023-10-16 RX ADMIN — PROPOFOL 20 MG: 10 INJECTION, EMULSION INTRAVENOUS at 14:51

## 2023-10-16 RX ADMIN — VANCOMYCIN HYDROCHLORIDE 1000 MG: 1 INJECTION, POWDER, LYOPHILIZED, FOR SOLUTION INTRAVENOUS at 11:05

## 2023-10-16 RX ADMIN — CALCITRIOL CAPSULES 0.25 MCG 0.25 MCG: 0.25 CAPSULE ORAL at 20:28

## 2023-10-16 RX ADMIN — CARVEDILOL 25 MG: 25 TABLET, FILM COATED ORAL at 16:54

## 2023-10-16 RX ADMIN — SODIUM CHLORIDE 1000 ML: 9 INJECTION, SOLUTION INTRAVENOUS at 10:12

## 2023-10-16 RX ADMIN — ROSUVASTATIN 10 MG: 20 TABLET, FILM COATED ORAL at 20:27

## 2023-10-16 RX ADMIN — PROPOFOL 150 MCG/KG/MIN: 10 INJECTION, EMULSION INTRAVENOUS at 14:31

## 2023-10-16 RX ADMIN — FUROSEMIDE 80 MG: 40 TABLET ORAL at 16:54

## 2023-10-16 RX ADMIN — HEPARIN SODIUM 5000 UNITS: 5000 INJECTION INTRAVENOUS; SUBCUTANEOUS at 20:28

## 2023-10-16 RX ADMIN — LIDOCAINE HYDROCHLORIDE 60 MG: 20 INJECTION, SOLUTION INFILTRATION; PERINEURAL at 14:33

## 2023-10-16 RX ADMIN — HEPARIN SODIUM 5000 UNITS: 5000 INJECTION INTRAVENOUS; SUBCUTANEOUS at 16:55

## 2023-10-16 RX ADMIN — PHENYLEPHRINE HYDROCHLORIDE 100 MCG: 10 INJECTION INTRAVENOUS at 14:52

## 2023-10-16 RX ADMIN — ACETAMINOPHEN 1000 MG: 500 TABLET ORAL at 10:03

## 2023-10-16 RX ADMIN — CEFEPIME 2000 MG: 2 INJECTION, POWDER, FOR SOLUTION INTRAVENOUS at 10:14

## 2023-10-16 ASSESSMENT — PAIN SCALES - GENERAL
PAINLEVEL_OUTOF10: 6
PAINLEVEL_OUTOF10: 2

## 2023-10-16 ASSESSMENT — ENCOUNTER SYMPTOMS
COUGH: 0
SINUS PRESSURE: 0
SHORTNESS OF BREATH: 0
NAUSEA: 0
VOMITING: 0
SORE THROAT: 0
SHORTNESS OF BREATH: 0
WHEEZING: 0
RHINORRHEA: 0
BACK PAIN: 0
SINUS PAIN: 0
DIARRHEA: 0
EYE PAIN: 0
CONSTIPATION: 0
EYE REDNESS: 0
ABDOMINAL PAIN: 0
EYE DISCHARGE: 0

## 2023-10-16 ASSESSMENT — PAIN DESCRIPTION - FREQUENCY: FREQUENCY: CONTINUOUS

## 2023-10-16 ASSESSMENT — PAIN DESCRIPTION - LOCATION: LOCATION: FOOT

## 2023-10-16 ASSESSMENT — PAIN DESCRIPTION - ORIENTATION: ORIENTATION: LEFT

## 2023-10-16 ASSESSMENT — PAIN DESCRIPTION - DESCRIPTORS: DESCRIPTORS: ACHING

## 2023-10-16 ASSESSMENT — PAIN - FUNCTIONAL ASSESSMENT: PAIN_FUNCTIONAL_ASSESSMENT: 0-10

## 2023-10-16 NOTE — PROGRESS NOTES
Nephrologist notified that patient is on peritoneal dialysis. Patient currently does not have orders for exchanges.

## 2023-10-16 NOTE — PROGRESS NOTES
Clinical Pharmacy Note: Pharmacy to Dose Vancomycin    Angela Cutler is a 66 y.o. male started on Vancomycin for SSTI; consult received from Dr. Laina Crowder to manage therapy. Also receiving the following antibiotics: cefepime. Goal AUC: 400-600 mg/L*hr  Goal Trough Level: 15-20 mcg/mL    Assessment/Plan:  A 1000 mg loading dose was given on 10/16 at 1105  As patient has ESRD on PD, will plan to dose by levels. A vancomycin random level has been ordered for 10/17 at 0600. Changes in regimen will be determined based on culture results, renal function, and clinical response. Pharmacy will continue to monitor and adjust regimen as necessary. Allergies: Iodides, Shellfish-derived products, and Atorvastatin calcium     Recent Labs     10/16/23  0930   CREATININE 8.1*       Recent Labs     10/16/23  0930   WBC 6.5       Ht Readings from Last 1 Encounters:   10/16/23 5' 7.5\" (1.715 m)        Wt Readings from Last 1 Encounters:   10/16/23 196 lb (88.9 kg)         Estimated Creatinine Clearance: 11 mL/min (A) (based on SCr of 8.1 mg/dL [facility])).       Thank you for the consult,    Kurt Avelar, PharmD, BCCCP

## 2023-10-16 NOTE — DISCHARGE INSTRUCTIONS
The [address] [place] [address] E. [place], [place], 45 [facility]    Dr. Nadir Haynes Operative Instructions    1. Have Prescriptions filled and take as directed. All medications should be taken with food or milk. 2.  Keep foot elevated six inches above the level of the heart. Support feet, legs, and knees with pillows. 3.  KEEP FOOT ELEVATED AS MUCH AS POSSIBLE UNTIL YOUR NEXT VISIT    4. Place an ice pack on the bandaged site for 15 minutes every hour while awake    5. Keep dressing clean, dry, and intact. DO NOT REMOVE DRESSING. CALL THE OFFICE IF BANDAGE COMES OFF. 6.  For the first 7 days after surgery, take temperature by mouth three times a day. Call the office if greater than 101F.    7.  Ambulate with non weight bearing to the left lower extremity with crutches / walker. 8.  All instructions are to be followed until otherwise instructed by your surgeon. 9.  Call our office if you have any concerns or questions which arise. Our phones are answered 24 hours a day. [phone]    25. Your first post-operative appointment is scheduled, call the office for appointment date and time if not known prior to today.

## 2023-10-16 NOTE — PROGRESS NOTES
Patient admitted to room 3324 from PACU. Dressing on LLE. Dressing clean, dry, intact. Patient oriented to room, call light, bed rails, phone, lights and bathroom. Bed alarm in place, patient aware of placement and reason. Telemetry box 3324 in place, patient aware of placement and reason. Bed locked, in lowest position, side rails up 2/4, call light within reach. Wife at bedside. Encouraged patient to call with needs.

## 2023-10-16 NOTE — ANESTHESIA POSTPROCEDURE EVALUATION
Department of Anesthesiology  Postprocedure Note    Patient: Paradise Zamudio  MRN: [id number]  YOB: 1965  Date of evaluation: 10/16/2023      Procedure Summary     Date: 10/16/23 Room / Location: [facility] OR [address] [place] [address]    Anesthesia Start: 6778 Anesthesia Stop:     Procedure: LEFT FOOT DEBRIDEMENT INCISION AND DRAINAGE WITH BONE BIOPSY (Left: Foot) Diagnosis:       Diabetic ulcer of left foot associated with other specified diabetes mellitus, unspecified part of foot, unspecified ulcer stage ([address])      (Diabetic ulcer of left foot associated with other specified diabetes mellitus, unspecified part of foot, unspecified ulcer stage ([address]) [G16.141, L97.529])    Surgeons: Betzaida Alanis DPM Responsible Provider: Morelia Jacob MD    Anesthesia Type: MAC, general ASA Status: 3          Anesthesia Type: No value filed.     Mary Phase I: Mary Score: 10    Mary Phase II:        Anesthesia Post Evaluation    Patient location during evaluation: PACU  Patient participation: complete - patient participated  Level of consciousness: awake  Airway patency: patent  Nausea & Vomiting: no vomiting and no nausea  Complications: no  Cardiovascular status: hemodynamically stable  Respiratory status: acceptable  Hydration status: stable  Multimodal analgesia pain management approach  Pain management: adequate

## 2023-10-16 NOTE — CARE COORDINATION
Case Management Assessment  Initial Evaluation    Date/Time of Evaluation: 10/16/2023 12:58 PM  Assessment Completed by: ROBIN Sinclair, TEJAW    If patient is discharged prior to next notation, then this note serves as note for discharge by case management. Patient Name: Alyssa Echeverria                   YOB: 1965  Diagnosis: Left foot infection [L08.9]                   Date / Time: 10/16/2023  9:02 AM    Patient Admission Status: Inpatient   Readmission Risk (Low < 19, Mod (19-27), High > 27): Readmission Risk Score: 23.6    Current PCP: Harjit García MD  PCP verified by CM? Yes    Chart Reviewed: Yes      History Provided by: Patient  Patient Orientation: Alert and Oriented    Patient Cognition: Alert    Hospitalization in the last 30 days (Readmission):  No    If yes, Readmission Assessment in  Navigator will be completed. Advance Directives:      Code Status: Prior   Patient's Primary Decision Maker is: Legal Next of Kin      Discharge Planning:    Patient lives with: Spouse/Significant Other Type of Home: House  Primary Care Giver: Self  Patient Support Systems include: Spouse/Significant Other   Current Financial resources: Medicare  Current community resources: None  Current services prior to admission: Durable Medical Equipment, Other (Comment) (Does PD dialysis at home)            Current DME: Other (Comment) (cane, PD dialysis equipment)            Type of Home Care services:  None    ADLS  Prior functional level: Independent in ADLs/IADLs  Current functional level: Independent in ADLs/IADLs (using cane to take pressure off the wound on his left foot)    PT AM-PAC:   /24  OT AM-PAC:   /24    Family can provide assistance at DC: Yes  Would you like Case Management to discuss the discharge plan with any other family members/significant others, and if so, who?  Yes (w/spouse)  Plans to Return to Present Housing: Yes  Other Identified Issues/Barriers to RETURNING to current housing: None  Potential Assistance needed at discharge: Other (Comment) (Unsure at this time)            Potential DME:    Patient expects to discharge to: [address] [facility] [place] for transportation at discharge: Family    Financial    Payor: MEDICARE / Plan: MEDICARE PART A AND B / Product Type: *No Product type* /     Potential assistance Purchasing Medications: No  Meds-to-Beds request:        [address] [facility], [address] [place] [address] [phone] - f [phone]  [address] [facility] [address]  Phone: [phone] Fax: [address] [facility] [address] [address] ([facility]), [address] [place] [address] [phone] - f [phone]  [place] [address] [place] [address]  Phone: [phone] Fax: [phone]      Notes:    Factors facilitating achievement of predicted outcomes: Family support, Motivated, and Cooperative    Barriers to discharge:  None    Additional Case Management Notes:      Patient has had 3 previous admissions prior to this one since July of 2023. Spoke to patient who reported no case management or community needs that could have prevented this admission. Patient reports good support from spouse, able to get to/from appointments with doctors. Stated he \"sometimes forgets\" appointments but setting up a system at home w/spouse to help with reminders. Stated has all DME and meds needed. He does his own PD at home. He has had [facility] in the past.  Reported he liked the agency and would like them again if recommended. Patient may need HHC for at least wound care follow up. SW to follow for needs. The Plan for Transition of Care is related to the following treatment goals of Left foot infection [B66.9]    IF APPLICABLE: The Patient and/or patient representative Kristopher [place] and his family were provided with a choice of provider and agrees with the discharge plan.  Freedom of choice list with basic dialogue that supports the patient's individualized

## 2023-10-16 NOTE — BRIEF OP NOTE
Brief Postoperative Note      Patient: Ariel Martin  YOB: 1965  MRN: [id number]    Date of Procedure: 10/16/2023    Pre-Op Diagnosis Codes:     * Diabetic ulcer of left foot associated with other specified diabetes mellitus, unspecified part of foot, unspecified ulcer stage ([address]) [E13.621, L97.529]    Post-Op Diagnosis: Post-Op Diagnosis Codes:     * Diabetic ulcer of left foot associated with other specified diabetes mellitus, unspecified part of foot, unspecified ulcer stage ([address]) [F80.152, L97.529]       Procedure(s):  LEFT FOOT DEBRIDEMENT INCISION AND DRAINAGE WITH BONE BIOPSY    Surgeon(s):  Vickki Meigs, DPM    Assistant:  Cassandra Barrera DPM PGY-3  Maxcine Males MS4    Anesthesia: Monitor Anesthesia Care    Hemostasis: electrocautery, anatomic dissection    Materials: 1/4 inch iodoform packing    Injectables:   Pre-op: 20 cc of 1% lidocaine plain  Post-op: 20 of 0.5% marcaine plain    Estimated Blood Loss (mL): less than 75    Complications: None    Specimens:   ID Type Source Tests Collected by Time Destination   1 : 1- AEROBIC CULTURE LEFT LATERL FOOT  Tissue Tissue CULTURE, FUNGUS, CULTURE, TISSUE Vickki Meigs, DPM 10/16/2023 1445    2 : 2- ANAEROBIC CULTURE LEFT LATERAL FOOT Tissue Tissue CULTURE, FUNGUS, CULTURE, TISSUE Vickki Meigs, DPM 10/16/2023 1443    A : A- BONE BIOPSY LEFT LATERAL FOOT Tissue Tissue SURGICAL PATHOLOGY Vickki Meigs, DPM 10/16/2023 1447        Implants:  * No implants in log *      Drains: * No LDAs found *    Findings: Scant purulent drainage noted in the OR, bone was noted to be soft. Bone sent for pathology    Dispo: s/p I&D of the left LE with bone biopsy left lower extremity. Surgical culture and pathology sent, will follow up results. ID consulted, appreciate recommendations. Continue IV antibiotics per primary team. PT/OT consult placed, patient may heel weight bear in surgical shoe.   Patient will require further surgical intervention during this admission consisting of possible amputation and delayed primary closure pending pathology findings. Patient to be transferred back to the floor at this time.     Chris Shore DPM   Podiatric Resident PGY2  Pager [phone] or PerfectServe  10/16/2023, 3:01 PM     Electronically signed by Chris Shore DPM on 10/16/2023 at 2:57 PM

## 2023-10-16 NOTE — OP NOTE
Operative Note      Patient: Ayan Najera  YOB: 1965  MRN: [id number]     Date of Procedure: 10/16/2023     Pre-Op Diagnosis Codes:     * Diabetic ulcer of left foot associated with other specified diabetes mellitus, unspecified part of foot, unspecified ulcer stage ([address]) [E13.621, L97.529]     Post-Op Diagnosis: Post-Op Diagnosis Codes:     * Diabetic ulcer of left foot associated with other specified diabetes mellitus, unspecified part of foot, unspecified ulcer stage ([address]) [I56.903, L97.529]       Procedure(s):  LEFT FOOT DEBRIDEMENT INCISION AND DRAINAGE WITH BONE BIOPSY     Surgeon(s):  Franchesca Cui DPM     Assistant:  Omar Little DPM PGY-3  Trevor Huffman MS4     Anesthesia: Monitor Anesthesia Care     Hemostasis: electrocautery, anatomic dissection     Materials: 1/4 inch iodoform packing     Injectables:   Pre-op: 20 cc of 1% lidocaine plain  Post-op: 20 of 0.5% marcaine plain     Estimated Blood Loss (mL): less than 75     Complications: None     Specimens:   ID Type Source Tests Collected by Time Destination   1 : 1- AEROBIC CULTURE LEFT LATERL FOOT  Tissue Tissue CULTURE, FUNGUS, CULTURE, TISSUE Franchesca Cui, [facility] 10/16/2023 1445     2 : 2- ANAEROBIC CULTURE LEFT LATERAL FOOT Tissue Tissue CULTURE, FUNGUS, CULTURE, TISSUE Franchesca Cui, [facility] 10/16/2023 1443     A : A- BONE BIOPSY LEFT LATERAL FOOT Tissue Tissue SURGICAL PATHOLOGY Franchesca Cui [facility] 10/16/2023 1447           Implants:  * No implants in log *      Drains: * No LDAs found *     Findings: Scant purulent drainage noted in the OR, bone was noted to be soft. Bone sent for pathology     Dispo: s/p I&D of the left LE with bone biopsy left lower extremity. Surgical culture and pathology sent, will follow up results. ID consulted, appreciate recommendations. Continue IV antibiotics per primary team. PT/OT consult placed, patient may heel weight bear in surgical shoe.   Patient will require further surgical

## 2023-10-16 NOTE — PROGRESS NOTES
Pt transferred to room 3324 at this time. A&O with no signs of distress. Report given to 3A RN. V/u and denies questions or further needs at this time. Wife at bedside.

## 2023-10-16 NOTE — PLAN OF CARE
Patient seen and evaluated in the ED. Will plan for surgical intervention later today consisting of incision and drainage with bone biopsy of the left foot. Patient to be NPO starting now, hold anticoagulants, please obtain consent. Formal note to follow.     Asha Mondragon DPM   Podiatric Resident PGY2  Pager [phone] or PerfectServe  10/16/2023, 12:13 PM

## 2023-10-16 NOTE — PROGRESS NOTES
Patient states that his cardiologist has currently put his Nifedipine and Hydralazine on hold. Both the medications are currently ordered this admission. Patient kindly declined these medications at this time. /79. Hospitalist notified.

## 2023-10-16 NOTE — ED PROVIDER NOTES
[facility]        Pt Name: Andrea Pham  MRN: [id number]  [address] [place] [address] 1965  Date of evaluation: 10/16/2023  Provider: VERONICA London  PCP: Seth Trujillo MD  Note Started: 9:22 AM EDT 10/16/23      ILEANA. I have evaluated this patient. CHIEF COMPLAINT       Chief Complaint   Patient presents with    Wound Check     Pt states diabetic and has wound on his right foot. Pt states was sent to wound center to get his foot checked. Pt states Wednesday foot was achy, Friday couldn't walk on it. Pt sttaes last night took his sock off and smelled something \"sour\" states there was fluid coming from wound. HISTORY OF PRESENT ILLNESS: 1 or more Elements     History from : Patient    Limitations to history : None    Andrea Pham is a 66 y.o. male who presents to the emergency room due to wound to the his left foot that is been present for several months however its been improving except for the last week or so. Patient states that when he walks he has worsening pain in the area as well. He has noticed some foul odor and discharge coming from the foot. He does see Dr. Julius Rosas from podiatry but has not seen him in over 2 months. He is not on any antibiotics recently. Nursing Notes were all reviewed and agreed with or any disagreements were addressed in the HPI. REVIEW OF SYSTEMS :      Review of Systems   Constitutional:  Negative for chills, diaphoresis and fever. HENT:  Negative for congestion, rhinorrhea and sore throat. Eyes:  Negative for pain and visual disturbance. Respiratory:  Negative for cough and shortness of breath. Cardiovascular:  Negative for chest pain and leg swelling. Gastrointestinal:  Negative for abdominal pain, constipation, diarrhea, nausea and vomiting. Genitourinary:  Negative for difficulty urinating, dysuria and frequency. Musculoskeletal:  Negative for back pain and neck pain. count lactic acid 0.7 CMP with creatinine of 8.1 does have end-stage renal disease. Patient will be admitted to the hospital.    Disposition Considerations (include 1 Tests not done, Shared Decision Making, Pt Expectation of Test or Tx.): considered CT scan of the foot. I am the Primary Clinician of Record. FINAL IMPRESSION      1. Diabetic ulcer of midfoot associated with diabetes mellitus due to underlying condition, limited to breakdown of skin, unspecified laterality ([address])          DISPOSITION/PLAN     DISPOSITION Admitted 10/16/2023 11:38:16 AM      PATIENT REFERRED TO:  No follow-up provider specified.     DISCHARGE MEDICATIONS:  New Prescriptions    No medications on file       DISCONTINUED MEDICATIONS:  Discontinued Medications    No medications on file              (Please note that portions of this note were completed with a voice recognition program.  Efforts were made to edit the dictations but occasionally words are mis-transcribed.)    VERONICA London (electronically signed)            Manasa London  10/16/23 1300

## 2023-10-16 NOTE — PROGRESS NOTES
Scant drainage noted at incision site. Dressing reinforced per order with an ABD pad, gauze, and ace wrap.

## 2023-10-16 NOTE — DISCHARGE INSTR - COC
Continuity of Care Form    Patient Name: Yola Stone   :  1965  MRN:  [id number]    Admit date:  10/16/2023  Discharge date:  10/24/2023    Code Status Order: Prior   Advance Directives:   [address] Documentation       Date/Time Healthcare Directive Type of Healthcare Directive Copy in [address] [place] [address] Agent's Name Healthcare Agent's Phone Number    10/16/23 1354 No, patient does not have an advance directive for healthcare treatment -- -- -- -- --            Admitting Physician:  Fauzia Andrade MD  PCP: Gloria Yang MD    [facility] Unit[facility] [address] Phone Number: [phone]    Emergency Contact:   Extended Emergency Contact Information  Primary Emergency Contact: Sachi Garay  Address: [address] [place] [address] [address] [facility] of [address] [facility] Phone: [phone]  Mobile Phone: [phone]  Relation: Spouse  Secondary Emergency Contact: [address] Phone: [phone]  Relation: Child    Past Surgical History:  Past Surgical History:   Procedure Laterality Date    BLADDER SURGERY Left 2023    CYSTOSCOPY, LEFT URETEROSCOPY, STONE BASKET MANIPULATION, PLACEMENT OF LEFT URETERAL STENT performed by Doc Proctor MD at [address] [facility]    triple bypass at [address] Right     [facility].      infant    LAPAROSCOPY INSERTION PERITONEAL CATHETER N/A 2020    LAPAROSCOPIC PLACEMENT OF PERITONEAL DIALYSIS  CATHETER performed by Casandra Billy MD at [facility]         Immunization History:   Immunization History   Administered Date(s) Administered    DT (pediatric) 1999    Influenza Vaccine, unspecified formulation 2012    Influenza Virus Vaccine 2009, 2012    Influenza, FLUARIX, FLULAVAL, FLUZONE (age 10 mo+) AND AFLURIA, nonverbal cues (demo/gestures)

## 2023-10-16 NOTE — ANESTHESIA PRE PROCEDURE
10/16/2023 09:30 AM    GLUCOSE 73 10/16/2023 09:30 AM    PROT 6.9 10/16/2023 09:30 AM    PROT 7.9 09/28/2010 10:08 AM    CALCIUM 9.1 10/16/2023 09:30 AM    BILITOT 0.3 10/16/2023 09:30 AM    ALKPHOS 90 10/16/2023 09:30 AM    AST 9 10/16/2023 09:30 AM    ALT 7 10/16/2023 09:30 AM       POC Tests:   No results for input(s): \"POCGLU\", \"POCNA\", \"POCK\", \"POCCL\", \"POCBUN\", \"POCHEMO\", \"POCHCT\" in the last 72 hours. Coags:   Lab Results   Component Value Date/Time    PROTIME 13.0 10/16/2023 12:25 PM    INR 0.98 10/16/2023 12:25 PM    APTT 30.7 12/08/2021 05:19 AM       HCG (If Applicable): No results found for: \"PREGTESTUR\", \"PREGSERUM\", \"HCG\", \"HCGQUANT\"     ABGs:   Lab Results   Component Value Date/Time    PHART 7.389 05/26/2010 01:51 AM    PO2ART 137.3 05/26/2010 01:51 AM    XNS3OWH 43.3 05/26/2010 01:51 AM    ZKZ4BYK 25.6 05/26/2010 01:51 AM    BEART 0.3 05/26/2010 01:51 AM    D4RNHVDZ 99.0 05/26/2010 01:51 AM        Type & Screen (If Applicable):  No results found for: \"LABABO\", \"LABRH\"    Drug/Infectious Status (If Applicable):  No results found for: \"HIV\", \"HEPCAB\"    COVID-19 Screening (If Applicable):   Lab Results   Component Value Date/Time    COVID19 Detected 12/07/2021 02:10 PM    COVID19 NOT DETECTED 08/19/2020 12:15 PM           Anesthesia Evaluation  Patient summary reviewed and Nursing notes reviewed no history of anesthetic complications:   Airway: Mallampati: I  TM distance: >3 FB   Neck ROM: full  Mouth opening: > = 3 FB   Dental: normal exam         Pulmonary:   (+) sleep apnea:      (-) asthma and shortness of breath                           Cardiovascular:    (+) hypertension:, angina: no interval change, past MI:, CAD:, CABG/stent: no interval change, CHF: diastolic, hyperlipidemia               ROS comment: 6/23    Summary   Definity was used to assist with endocardial border delineation. Moderately dilated left ventricular cavity size. Normal left ventricular   wall thickness.  Moderately

## 2023-10-16 NOTE — CONSULTS
Department of Podiatry Consult Note  Resident       Reason for Consult:  concern for OM on xray  Requesting Physician:  Lucille Aden MD    CHIEF COMPLAINT:  Increasing pain to the left lower extremity, history of wound to the left foot    HISTORY OF PRESENT ILLNESS:                The patient is a 66 y.o. male with significant past medical history as listed below. Podiatry was consulted for evaluation and management of possible osteomyelitis as noted on Xray. The patient states that he follows with Dr. Karla Milton in the outpatient setting for podiatric care. He notes that he has been having increase pain to the outside of the left foot at the wound site. The patient denies drainage but relates a significant malodor present. The patient states that he called Dr. Elmira Donnelly office who recommend that he present to the ED. The patient has not seen a provider since his previous admission for foot care. Patient's last meal was 8 pm Sunday evening. Patient denies fever, chills, nausea, vomiting, shortness of breath, chest pain. Patient has no other pedal complaints at this time.     Past Medical History:        Diagnosis Date    Angina at rest [facility])     Cardiomyopathy ([address] )     Carotid artery stenosis 10/25/2016    SUZANNA stented with 8 x 30 mm non-tapered Xact stent    CHF (congestive heart failure) ([address]) 3/3/15    EF 30%     CKD (chronic kidney disease) stage 2, GFR 60-89 ml/min     Coronary artery disease     sp CABG UC    Diabetic peripheral neuropathy (HCC)     Diastolic HF (heart failure) ([facility])     Hyperlipidemia with target LDL less than 70     Hypertension goal BP (blood pressure) < 130/80     PVD (peripheral vascular disease) ([facility])     Seizures ([facility])     Type 1 diabetes mellitus with chronic kidney disease ([facility])     c-peptide <0.1 in 2015       Past Surgical History:        Procedure Laterality Date    BLADDER SURGERY Left 8/24/2023    CYSTOSCOPY, LEFT URETEROSCOPY, STONE BASKET MANIPULATION, PLACEMENT OF LEFT the correct clinical setting. Recommend repeat  MRI with contrast as appropriate to confirm. MRI LEFT foot pending:    IMPRESSION/RECOMMENDATIONS:    - Diabetic foot infection, left lower extremity  - Concern for abscess left foot  - Possible OM left foot  - Diabetes mellitus type II with peripheral neuropathy      -Patient examined and evaluated at the bedside   -Hypertensive otherwise VSS. No leukocytosis noted. -ESR and CRP pending  -X-rays reviewed with impressions noted above. -MRI ordered, will assist with further evaluation of extent of OM  -Wound culture: not obtained 2/2 patient was unable to tolerate  -Blood culture: pending  -Debridement was unable to be performed at bedside as it was too painful for the patient. -LLE dressed with with betadine, gauze, kerlix, ACE  -Antibiotics: IV vancomycin, IV cefepime per primary team   -Will consult ID, appreciate recommendations   -Weightbearing as tolerated to the right lower extremity, weightbearing to the heel only in surgical shoe left lower extremity  -Plan for OR today consisting of incision and drainage with bone biopsy of the left foot. Discussed surgical intervention with patient at bedside. All potential risks, benefits, and complications were discussed with the patient prior to the scheduling of surgery. No guarantees or promises were made to what the outcomes will be. The patient wished to proceed with surgery, and informed written consent was obtained, signed, and placed on the patient's chart.    -Patient to be NPO now   -Hold anticoagulants   -EKG ordered   -Chest xray ordered      DISPO:Diabetic foot infection left lower extremity. Labs and imaging reviewed. Concern for abscess on examination. Will plan for OR today for I&D with bone biopsy of the left foot. Patient is NPO, hold anticoagulants. Pre-op labs/imaging ordered. Consent to be obtained prior to procedure. ID and PT/OT consult to follow procedure.  Patient will likely require serial

## 2023-10-16 NOTE — PROGRESS NOTES
Pt arrived from OR to PACU bay 2. Report received from OR staff. Pt arousable to voice. Surgical incisions dressings in place to LLE. NSR, and VSS. Will continue to monitor.

## 2023-10-16 NOTE — PROGRESS NOTES
Patient seen in ED, room 07. Admission initiated and completed through the personal Belongings. IP nurse will need to begin with the Psychosocial review and complete the admission including the 4 Eyes Assessment, Immunizations, Vaccines, Rights and Responsibilities, Orientation to room, Plan of Care, Education/Learning Assessment and Education Plan, white board, height and weight, pain assessment and head to toe assessment. Patient is alert and is being admitted for Left Foot Infection. Home Medications as well as Outside Sources has been verbally reviewed with patient and updated as appropriate and is now Completed. All questions answered. Patient has an implanted insulin pump. The settings are on the Med Rec.

## 2023-10-16 NOTE — PROGRESS NOTES
Pt stable and able to be transferred from PACU to room 3324. A&O , VSS, with no complaints at this time. 3A called and notified that pt is being transferred out of PACU and to room.

## 2023-10-17 DIAGNOSIS — N18.4 TYPE 1 DIABETES MELLITUS WITH STAGE 4 CHRONIC KIDNEY DISEASE (HCC): Primary | ICD-10-CM

## 2023-10-17 DIAGNOSIS — E10.22 TYPE 1 DIABETES MELLITUS WITH STAGE 4 CHRONIC KIDNEY DISEASE (HCC): Primary | ICD-10-CM

## 2023-10-17 LAB
EST. AVERAGE GLUCOSE BLD GHB EST-MCNC: 211.6 MG/DL
GLUCOSE BLD-MCNC: 103 MG/DL (ref 70–99)
GLUCOSE BLD-MCNC: 113 MG/DL (ref 70–99)
GLUCOSE BLD-MCNC: 152 MG/DL (ref 70–99)
GLUCOSE BLD-MCNC: 262 MG/DL (ref 70–99)
HBA1C MFR BLD: 9 %
HCT VFR BLD AUTO: 39.7 % (ref 40.5–52.5)
HGB BLD-MCNC: 12.9 G/DL (ref 13.5–17.5)
PERFORMED ON: ABNORMAL
VANCOMYCIN SERPL-MCNC: 12.8 UG/ML

## 2023-10-17 PROCEDURE — 2580000003 HC RX 258: Performed by: INTERNAL MEDICINE

## 2023-10-17 PROCEDURE — 97530 THERAPEUTIC ACTIVITIES: CPT

## 2023-10-17 PROCEDURE — 6370000000 HC RX 637 (ALT 250 FOR IP): Performed by: INTERNAL MEDICINE

## 2023-10-17 PROCEDURE — 99233 SBSQ HOSP IP/OBS HIGH 50: CPT | Performed by: INTERNAL MEDICINE

## 2023-10-17 PROCEDURE — 1200000000 HC SEMI PRIVATE

## 2023-10-17 PROCEDURE — 6360000002 HC RX W HCPCS: Performed by: INTERNAL MEDICINE

## 2023-10-17 PROCEDURE — 97166 OT EVAL MOD COMPLEX 45 MIN: CPT

## 2023-10-17 PROCEDURE — 90945 DIALYSIS ONE EVALUATION: CPT

## 2023-10-17 PROCEDURE — 80202 ASSAY OF VANCOMYCIN: CPT

## 2023-10-17 PROCEDURE — 85018 HEMOGLOBIN: CPT

## 2023-10-17 PROCEDURE — 97162 PT EVAL MOD COMPLEX 30 MIN: CPT

## 2023-10-17 PROCEDURE — 36415 COLL VENOUS BLD VENIPUNCTURE: CPT

## 2023-10-17 PROCEDURE — 6370000000 HC RX 637 (ALT 250 FOR IP)

## 2023-10-17 PROCEDURE — 94760 N-INVAS EAR/PLS OXIMETRY 1: CPT

## 2023-10-17 PROCEDURE — 6370000000 HC RX 637 (ALT 250 FOR IP): Performed by: HOSPITALIST

## 2023-10-17 PROCEDURE — 6360000002 HC RX W HCPCS: Performed by: HOSPITALIST

## 2023-10-17 PROCEDURE — 97535 SELF CARE MNGMENT TRAINING: CPT

## 2023-10-17 PROCEDURE — 94150 VITAL CAPACITY TEST: CPT

## 2023-10-17 PROCEDURE — 85014 HEMATOCRIT: CPT

## 2023-10-17 RX ORDER — DEXTROSE MONOHYDRATE 100 MG/ML
INJECTION, SOLUTION INTRAVENOUS CONTINUOUS PRN
Status: DISCONTINUED | OUTPATIENT
Start: 2023-10-17 | End: 2023-10-17 | Stop reason: SDUPTHER

## 2023-10-17 RX ORDER — INSULIN LISPRO 100 [IU]/ML
INJECTION, SOLUTION INTRAVENOUS; SUBCUTANEOUS
Qty: 20 ML | Refills: 2 | Status: SHIPPED | OUTPATIENT
Start: 2023-10-17

## 2023-10-17 RX ORDER — FLUCONAZOLE 100 MG/1
100 TABLET ORAL DAILY
Status: DISCONTINUED | OUTPATIENT
Start: 2023-10-17 | End: 2023-10-18

## 2023-10-17 RX ORDER — GLUCAGON 1 MG/ML
1 KIT INJECTION PRN
Status: DISCONTINUED | OUTPATIENT
Start: 2023-10-17 | End: 2023-10-24 | Stop reason: HOSPADM

## 2023-10-17 RX ADMIN — MORPHINE SULFATE 2 MG: 2 INJECTION, SOLUTION INTRAMUSCULAR; INTRAVENOUS at 03:36

## 2023-10-17 RX ADMIN — CARVEDILOL 25 MG: 25 TABLET, FILM COATED ORAL at 16:55

## 2023-10-17 RX ADMIN — OXYCODONE HYDROCHLORIDE AND ACETAMINOPHEN 1 TABLET: 5; 325 TABLET ORAL at 05:34

## 2023-10-17 RX ADMIN — FLUCONAZOLE 100 MG: 100 TABLET ORAL at 16:54

## 2023-10-17 RX ADMIN — VANCOMYCIN HYDROCHLORIDE 1250 MG: 1.25 INJECTION, POWDER, LYOPHILIZED, FOR SOLUTION INTRAVENOUS at 12:31

## 2023-10-17 RX ADMIN — CALCITRIOL CAPSULES 0.25 MCG 0.25 MCG: 0.25 CAPSULE ORAL at 09:40

## 2023-10-17 RX ADMIN — ISOSORBIDE MONONITRATE 30 MG: 30 TABLET, EXTENDED RELEASE ORAL at 09:40

## 2023-10-17 RX ADMIN — OXYCODONE HYDROCHLORIDE 10 MG: 5 TABLET ORAL at 18:51

## 2023-10-17 RX ADMIN — ASPIRIN 81 MG: 81 TABLET, COATED ORAL at 09:40

## 2023-10-17 RX ADMIN — OXYCODONE HYDROCHLORIDE AND ACETAMINOPHEN 1 TABLET: 5; 325 TABLET ORAL at 21:31

## 2023-10-17 RX ADMIN — ROSUVASTATIN 10 MG: 20 TABLET, FILM COATED ORAL at 21:29

## 2023-10-17 RX ADMIN — Medication 1 CAPSULE: at 09:40

## 2023-10-17 RX ADMIN — CALCITRIOL CAPSULES 0.25 MCG 0.25 MCG: 0.25 CAPSULE ORAL at 21:31

## 2023-10-17 RX ADMIN — Medication 1 CAPSULE: at 16:54

## 2023-10-17 RX ADMIN — HEPARIN SODIUM 5000 UNITS: 5000 INJECTION INTRAVENOUS; SUBCUTANEOUS at 05:34

## 2023-10-17 RX ADMIN — RANOLAZINE 500 MG: 500 TABLET, EXTENDED RELEASE ORAL at 21:38

## 2023-10-17 RX ADMIN — MEROPENEM 500 MG: 500 INJECTION, POWDER, FOR SOLUTION INTRAVENOUS at 16:56

## 2023-10-17 RX ADMIN — RANOLAZINE 500 MG: 500 TABLET, EXTENDED RELEASE ORAL at 09:41

## 2023-10-17 RX ADMIN — CARVEDILOL 25 MG: 25 TABLET, FILM COATED ORAL at 09:40

## 2023-10-17 RX ADMIN — SACUBITRIL AND VALSARTAN 1 TABLET: 49; 51 TABLET, FILM COATED ORAL at 09:40

## 2023-10-17 RX ADMIN — CLOPIDOGREL 75 MG: 75 TABLET, FILM COATED ORAL at 09:40

## 2023-10-17 RX ADMIN — FUROSEMIDE 80 MG: 40 TABLET ORAL at 09:40

## 2023-10-17 RX ADMIN — FUROSEMIDE 80 MG: 40 TABLET ORAL at 16:55

## 2023-10-17 RX ADMIN — SACUBITRIL AND VALSARTAN 1 TABLET: 49; 51 TABLET, FILM COATED ORAL at 21:29

## 2023-10-17 ASSESSMENT — PAIN SCALES - GENERAL
PAINLEVEL_OUTOF10: 7
PAINLEVEL_OUTOF10: 5
PAINLEVEL_OUTOF10: 0
PAINLEVEL_OUTOF10: 5
PAINLEVEL_OUTOF10: 0
PAINLEVEL_OUTOF10: 3
PAINLEVEL_OUTOF10: 7
PAINLEVEL_OUTOF10: 9
PAINLEVEL_OUTOF10: 0

## 2023-10-17 ASSESSMENT — PAIN SCALES - WONG BAKER
WONGBAKER_NUMERICALRESPONSE: 2
WONGBAKER_NUMERICALRESPONSE: 2
WONGBAKER_NUMERICALRESPONSE: 0

## 2023-10-17 ASSESSMENT — ENCOUNTER SYMPTOMS
VOMITING: 0
CONSTIPATION: 0
EYE DISCHARGE: 0
BACK PAIN: 0
SORE THROAT: 0
RHINORRHEA: 0
NAUSEA: 0
WHEEZING: 0
DIARRHEA: 0
SINUS PAIN: 0
ABDOMINAL PAIN: 0
COUGH: 0
EYE REDNESS: 0
SINUS PRESSURE: 0
SHORTNESS OF BREATH: 0

## 2023-10-17 ASSESSMENT — PAIN DESCRIPTION - FREQUENCY
FREQUENCY: INTERMITTENT
FREQUENCY: CONTINUOUS

## 2023-10-17 ASSESSMENT — PAIN DESCRIPTION - ORIENTATION
ORIENTATION: LEFT

## 2023-10-17 ASSESSMENT — PAIN DESCRIPTION - DESCRIPTORS
DESCRIPTORS: SORE;SHOOTING
DESCRIPTORS: SORE

## 2023-10-17 ASSESSMENT — PAIN DESCRIPTION - LOCATION
LOCATION: FOOT

## 2023-10-17 NOTE — PROGRESS NOTES
[facility] Department   Phone: [phone]    Occupational Therapy    [x] Initial Evaluation            [] Daily Treatment Note         [] Discharge Summary      Patient: Meliza Ordoñez   : 1965   MRN: [id number]   Date of Service:  10/17/2023    Admitting Diagnosis:  Left foot infection  Current Admission Summary: a 66 y.o. male who presents to the emergency room due to wound to the his left foot that is been present for several months however its been improving except for the last week or so. Patient states that when he walks he has worsening pain in the area as well. He has noticed some foul odor and discharge coming from the foot. He does see Dr. Tyrnoe Benavides from podiatry but has not seen him in over 2 months. He is not on any antibiotics recently. Past Medical History:  has a past medical history of Angina at rest, Cardiomyopathy [facility]), Carotid artery stenosis, CHF (congestive heart failure) ([address]), CKD (chronic kidney disease) stage 2, GFR 60-89 ml/min, Coronary artery disease, Diabetic peripheral neuropathy ([address]), Diastolic HF (heart failure) ([address]), Hyperlipidemia with target LDL less than 70, Hypertension goal BP (blood pressure) < 130/80, PVD (peripheral vascular disease) ([address]), Seizures ([address]), and Type 1 diabetes mellitus with chronic kidney disease ([address]). Past Surgical History:  has a past surgical history that includes Cardiac surgery (); Coronary artery bypass graft; Cardiac catheterization; hernia repair; Tonsillectomy; LAPAROSCOPY INSERTION PERITONEAL CATHETER (N/A, 2020); Carotid stent placement (Right); Bladder surgery (Left, 2023); and Foot Debridement (Left, 10/16/2023). Discharge Recommendations: Meliza Ordoñez scored a  on the AM-PAC ADL Inpatient form. Current research shows that an AM-PAC score of 17 or less is typically not associated with a discharge to the patient's home setting.  Based on the patient's AM-PAC score and

## 2023-10-17 NOTE — PROGRESS NOTES
Treatment initiated without complications or complaints from patient. Patient resting comfortably with VSS upon dialysis RN exit from room. LAURA Llanos notified of start of treatment and hotline number located on top of machine for any questions about troubleshooting during after hours.   Emergency # [phone]

## 2023-10-17 NOTE — TELEPHONE ENCOUNTER
Patient is admitted to [facility] and is in need of an insulin refill asap as he is almost out. Pended to hospital pharmacy.      Medication:   Requested Prescriptions     Pending Prescriptions Disp Refills    insulin lispro (HUMALOG) 100 UNIT/ML SOLN injection vial 20 mL 2     Sig: Use 30-40 units daily via pump       Last Filled:      Patient Phone Number: [phone] (home)     Last appt: 8/21/2023   Next appt: Visit date not found    Last Labs DM:   Lab Results   Component Value Date/Time    LABA1C 8.9 07/05/2023 05:13 AM

## 2023-10-17 NOTE — FLOWSHEET NOTE
Took over care of pt pt c/o pain in right leg and hip morphine given per MAR. Pt answered all orientation questions correctly but is very Ruby.

## 2023-10-17 NOTE — PROGRESS NOTES
Infectious Diseases   Progress Note      Admission Date: 10/16/2023  Hospital Day: Hospital Day: 2   Attending: Jean-Paul Babcock MD  Date of service: 10/17/2023     Chief complaint/ Reason for consult:       Complicated left diabetic foot infection with concern for gas gangrene  S/p left foot surgical I&D with bone biopsy on 10/16/2023  Longstanding type 2 diabetes mellitus  Diabetic polyneuropathy type II  End-stage renal disease on hemodialysis    Microbiology:        I have reviewed allavailable micro lab data and cultures    Blood culture (2/2) - collected on 10/16/2023: in process  Left foot surgical culture  - collected on 10/16/2023: in process      Antibiotics and immunizations:       Current antibiotics: All antibiotics and their doses were reviewed by me    Recent Abx Admin                     vancomycin (VANCOCIN) 1,250 mg in sodium chloride 0.9 % 250 mL IVPB ([id number]) (mg) 1,250 mg New Bag 10/17/23 1231    meropenem (MERREM) 500 mg in sodium chloride 0.9 % 100 mL IVPB (mini-bag) (mg) 500 mg New Bag 10/16/23 5374                      Immunization History: All immunization history was reviewed by me today. Immunization History   Administered Date(s) Administered    DT (pediatric) 01/01/1999    Influenza Vaccine, unspecified formulation 11/27/2012    Influenza Virus Vaccine 09/11/2009, 11/27/2012    Influenza, FLUARIX, FLULAVAL, FLUZONE (age 10 mo+) AND AFLURIA, (age 1 y+), PF, 0.5mL 10/26/2016, 02/06/2018, 03/05/2021    Pneumococcal, PPSV23, PNEUMOVAX 23, (age 2y+), SC/IM, 0.5mL 04/20/2011, 02/21/2014    TDaP, ADACEL (age 6y-58y), BOOSTRIX (age 10y+), IM, 0.5mL 02/21/2014    Td, unspecified formulation 04/20/2011       Known drug allergies:      All allergies were reviewed and updated    Allergies   Allergen Reactions    Iodides Anaphylaxis    Shellfish-Derived Products Anaphylaxis    Atorvastatin Calcium Rash       Social history:     Social History:  All social andepidemiologic history was reviewed Diabetic nephropathy associated with type 1 diabetes mellitus ([facility]) E10.21    Chest pain R07.9    Stenosis of right carotid artery I65.21    Carotid artery calcification I65.29    H/O carotid angioplasty Z98.62    Pure hypercholesterolemia E78.00    Abnormal cardiovascular stress test R94.39    Acute encephalopathy G93.40    Generalized idiopathic epilepsy, not intractable, without status epilepticus ([address]) G40.309    Seizure disorder ([address]) G40.909    NSTEMI (non-ST elevated myocardial infarction) ([address]) I21.4    Essential hypertension I10    S/P CABG (coronary artery bypass graft) Z95.1    Ischemic cardiomyopathy I25.5    Venous insufficiency of both lower extremities I87.2    Atrial fibrillation with rapid ventricular response ([facility]) I48.91    Syncope R55    Stage 4 chronic kidney disease ([facility]) N18.4    NSVT (nonsustained ventricular tachycardia) ([facility]) I47.29    Acute on chronic combined systolic and diastolic CHF (congestive heart failure) ([facility]) I50.43    Left leg weakness R29.898    Arterial ischemic stroke, ICA, right, acute ([address]) I63.231    DM (diabetes mellitus), type 2, uncontrolled with complications [id number]    End-stage renal disease on peritoneal dialysis ([facility]) N18.6, Z99.2    Hypersomnia G47.10    Moderate obstructive sleep apnea G47.33    Snoring R06.83    Unstable angina ([facility]) I20.0    ESRD (end stage renal disease) on dialysis ([facility]) N18.6, Z99.2    Fever due to COVID-19 U07.1, R50.81    Peritonitis associated with peritoneal dialysis ([facility]) T85.71XA    Sepsis ([address]) A41.9    Dialysis-associated peritonitis ([facility]) T85.71XA    Nausea and vomiting R11.2    Elevated procalcitonin R79.89    Peritoneal dialysis catheter in place [facility]) Z99.2    Abdominal pain R10.9    Poorly controlled type 1 diabetes mellitus ([address]) E10.65    Overweight (BMI 25.0-29. 9) E66.3    Carotid stenosis, right I65.21    Hyperlipidemia due to type 1 diabetes mellitus ([facility]) E10.69, E78.5    HFrEF (heart failure with reduced ejection

## 2023-10-17 NOTE — PROGRESS NOTES
[address] [facility] Department   Phone: [phone]    Physical Therapy    [x] Initial Evaluation            [] Daily Treatment Note         [] Discharge Summary      Patient: Ayan Najera   : 1965   MRN: [id number]   Date of Service:  10/17/2023  Admitting Diagnosis: Left foot infection  Current Admission Summary: per H&P 10/16: \"John L Marylouise Merlin is a 66 y.o. male who presented with presents to ER with complaints of left foot pain. Patient with known history of diabetic wound and therefore he has been going to wound care center. Patient said on Friday or Wednesday of wound was very itchy could not walk on Friday. Last night patient took up small and noted some foul-smelling with fluid coming out from the wound. Patient came to ER for further evaluation subjective chills no fever denies any nausea vomiting. Nothing that makes it better or worse\"  10/17 - I&D of (L) foot, heel WBAT in surgical shoe after     Past Medical History:  has a past medical history of Angina at rest, Cardiomyopathy ([address]), Carotid artery stenosis, CHF (congestive heart failure) ([address]), CKD (chronic kidney disease) stage 2, GFR 60-89 ml/min, Coronary artery disease, Diabetic peripheral neuropathy ([address]), Diastolic HF (heart failure) ([address]), Hyperlipidemia with target LDL less than 70, Hypertension goal BP (blood pressure) < 130/80, PVD (peripheral vascular disease) ([address]), Seizures ([address]), and Type 1 diabetes mellitus with chronic kidney disease ([address]). Past Surgical History:  has a past surgical history that includes Cardiac surgery (); Coronary artery bypass graft; Cardiac catheterization; hernia repair; Tonsillectomy; LAPAROSCOPY INSERTION PERITONEAL CATHETER (N/A, 2020); Carotid stent placement (Right); Bladder surgery (Left, 2023); and Foot Debridement (Left, 10/16/2023). Discharge Recommendations: Ayan Najera scored a 16/24 on the AM-PAC short mobility form.  Current research shows

## 2023-10-17 NOTE — PROGRESS NOTES
Treatment time: 9 Hours 39 minutes  Net UF: -212 ml  Dwell Time: 22 minutes Gained     Treatment completed without complications or complaints from patient. Effluent clear yellow and lines taped to patient per protocol. Patient resting comfortably with VSS upon exiting room. Copy of dialysis treatment record placed in chart, to be scanned into EMR and report given to LAURA Garcia.

## 2023-10-17 NOTE — PROGRESS NOTES
CLINICAL PHARMACY NOTE: MEDS TO BEDS    Total # of Prescriptions Filled: 1   The following medications were delivered to the patient:  Insulin lispro 100unit/ml soln.     Additional Documentation:     Medication was picked up and signed for in the Outpatient Pharmacy by LAURA Monet CPhT

## 2023-10-17 NOTE — PROGRESS NOTES
The patients entire dressing was saturated. Podiatry and MD Ced Cordova notified. Podiatry in route supplies requested at the bedside.

## 2023-10-17 NOTE — PROGRESS NOTES
Treatment initiated without complications or complaints from patient. Patient resting comfortably with VSS upon dialysis RN exit from room. Jamir Mooney RN notified of start of treatment and hotline number located on top of machine for any questions about troubleshooting during after hours.

## 2023-10-17 NOTE — PROGRESS NOTES
Assessment completed, see doc flowsheets. Pt is A&O X4. Lung sounds are diminished. VSS. Tele on. Medication given per [facility]. Patient has no needs at this time. Call light within in reach, will continue to monitor.

## 2023-10-17 NOTE — PROGRESS NOTES
Pharmacy Vancomycin Consult     Sherron Huffman is a 66 y.o. male is receiving vancomycin for SSTI. Current Dosing: intermittent dosing with PD  Goal trough level 15-20    Random Level:    Latest Reference Range & Units 10/17/23 05:04   Vancomycin Rm ug/mL 12.8         Assessment/Plan:  Give Vancomycin 1250mg IVPB today.     Per pharmacy consult,  Dipesh KolbD

## 2023-10-17 NOTE — PROGRESS NOTES
Humalog Insulin vial given to RN to store in [facility] refrigerator for the patient. Patricia Velasquez RN, BSN, Chrissie Juarez.

## 2023-10-17 NOTE — PROGRESS NOTES
10/17/23 1943   Incentive Spirometry Tx   Treatment Effort Initial;Assisted by RT   Predicted Volume 661   Achieved Volume (mL) 1200 mL

## 2023-10-17 NOTE — PROGRESS NOTES
Hyperglycemia? Yes    Reviewed symptoms, prevention and treatment.     Level of patient/caregiver understanding:      [x] Verbalized Understanding   [] Demonstrated Understanding       [] Teach Back       [] Needs Reinforcement     []  Other:           Plan        Dietitian Consult Made:  No  Social Service Consult Made:  Yes  Hospice Care: N/A    Given Diabetic Meter: No   [] AccuCheck Camilla   [] One Touch   [] AccuCheck  [] Freestyle  [x] Other: has Dexcom 6 CGM    Discharge Plan:  Recommend Outpatient Diabetes Education Classes : No  Placement for patient upon discharge: home with support versus Skilled Nursing if has amputation     Teaching Time Diabetes Education:  60 minutes     Electronically signed by Sandi Alexander RN [facility] on 10/17/2023 at 3:11 PM

## 2023-10-17 NOTE — CONSULTS
Nephrology Consult Note  [phone]   [email or web address]           Reason for Consult: ESRD     HISTORY OF PRESENT ILLNESS:    The patient is a 56 y. o.male with significant past medical history of ESRD on PD, KELLY s/p SUZANNA stent, CAD/CABG/CHF DM II, HPL, PVD, chronic left foot Diabetic wound seizures came in with   C/o pain, drainage from the left foor wound, follows with wound care otherwise  We are consulted for ESRD management  He has been on Nocturnal Peritoneal dialysis and follows with Dr Ulysses Bird seen and examined  Bleeding from the left foot wound at this time  Noted to be orthostatic per RN  Not very interactive at this time  Feels weak  Has pain in the left foot wound    Past Medical History:        Diagnosis Date    Angina at rest     Cardiomyopathy [facility])     Carotid artery stenosis 10/25/2016    SUZANNA stented with 8 x 30 mm non-tapered Xact stent    CHF (congestive heart failure) ([address]) 03/03/2015    EF 30%     CKD (chronic kidney disease) stage 2, GFR 60-89 ml/min     Coronary artery disease     sp CABG UC    Diabetic peripheral neuropathy (HCC)     Diastolic HF (heart failure) ([address])     Hyperlipidemia with target LDL less than 70     Hypertension goal BP (blood pressure) < 130/80     PVD (peripheral vascular disease) ([address])     Seizures ([address])     in childhood    Type 1 diabetes mellitus with chronic kidney disease ([address])     c-peptide <0.1 in 2015       Past Surgical History:        Procedure Laterality Date    BLADDER SURGERY Left 08/24/2023    CYSTOSCOPY, LEFT URETEROSCOPY, STONE BASKET MANIPULATION, PLACEMENT OF LEFT URETERAL STENT performed by Dodie Higgins MD at [facility]  2001    triple bypass at [facility] Right     CORONARY ARTERY BYPASS GRAFT      FOOT DEBRIDEMENT Left 10/16/2023    LEFT FOOT DEBRIDEMENT INCISION AND DRAINAGE WITH BONE BIOPSY performed by Trini Wright DPM at [address] [place] [address]

## 2023-10-17 NOTE — PROGRESS NOTES
V2.0    [facility] Progress Note      Name:  Meliza Ordoñez /Age/Sex: 1965  (66 y.o. male)   MRN & CSN:  [id number] & [id number] Encounter Date/Time: 10/17/2023 8:59 AM EDT   Location:  [address] [place], [place] [id number] PCP: Minh Polk MD     Attending:Toan Artis MD       Hospital Day: 2    Assessment and Recommendations   Meliza Ordoñez is a 66 y.o. male who presents with Left foot infection      Plan:   Complicated left diabetic foot ulcer  -Patient underwent left surgical incision drainage and bone biopsy  -We will continue broad-spectrum antibiotics  Follow-up on culture. Further recommendation to follow    Diabetes  Still not well controlled continue to adjust Lantus sliding scale    End-stage renal on peritoneal dialysis  Continue dialysis per nephrology    Morbid obesity    History of paroxysmal A-fib continue rate control      Diet ADULT DIET; Regular; 3 carb choices (45 gm/meal)   DVT Prophylaxis    Code Status Full Code   Disposition          Personally reviewed Lab Studies and Imaging         Subjective:     Chief Complaint:     Meliza Ordoñez is a 66 y.o. male who presents with   Resting comfortably still with pain denies any chest pain    Review of Systems:      Pertinent positives and negatives discussed in HPI    Objective: Intake/Output Summary (Last 24 hours) at 10/17/2023 0859  Last data filed at 10/17/2023 0339  Gross per 24 hour   Intake 500 ml   Output 750 ml   Net -250 ml      Vitals:   Vitals:    10/17/23 0406 10/17/23 0534 10/17/23 0545 10/17/23 0604   BP:       Pulse:       Resp:    Temp:       TempSrc:       SpO2:       Weight:   201 lb 3.2 oz (91.3 kg)    Height:             Physical Exam:      General: NAD  Eyes: EOMI  ENT: neck supple  Cardiovascular: Regular rate. Respiratory: Clear to auscultation  Gastrointestinal: Soft, non tender  Genitourinary: no suprapubic tenderness  Musculoskeletal: No edema  Skin: warm, dry  Neuro: Alert. Psych: Mood appropriate. Results   Component Value Date/Time    LABA1C 8.9 07/05/2023 05:13 AM     TSH:   Lab Results   Component Value Date/Time    TSH 1.60 05/01/2020 06:44 AM     Troponin: No results found for: \"TROPONINT\"  Lactic Acid:   Recent Labs     10/16/23  0926   LACTA 0.7     BNP: No results for input(s): \"PROBNP\" in the last 72 hours. UA:  Lab Results   Component Value Date/Time    NITRU Negative 08/22/2023 02:46 PM    COLORU Yellow 08/22/2023 02:46 PM    PHUR 5.5 08/22/2023 02:46 PM    WBCUA 3-5 08/22/2023 02:46 PM    RBCUA 0-2 08/22/2023 02:46 PM    MUCUS 1+ 05/25/2010 07:19 PM    BACTERIA None Seen 08/20/2023 01:58 PM    CLARITYU Clear 08/22/2023 02:46 PM    SPECGRAV 1.010 08/22/2023 02:46 PM    LEUKOCYTESUR SMALL 08/22/2023 02:46 PM    UROBILINOGEN 0.2 08/22/2023 02:46 PM    BILIRUBINUR Negative 08/22/2023 02:46 PM    BILIRUBINUR NEGATIVE 05/25/2010 07:19 PM    BLOODU Negative 08/22/2023 02:46 PM    GLUCOSEU 250 08/22/2023 02:46 PM    GLUCOSEU >=1000 05/25/2010 07:19 PM    KETUA Negative 08/22/2023 02:46 PM    AMORPHOUS 2+ 12/07/2021 05:08 AM     Urine Cultures:   Lab Results   Component Value Date/Time    LABURIN No growth at 18-36 hours 05/18/2016 09:10 AM     Blood Cultures:   Lab Results   Component Value Date/Time    BC No Growth after 4 days of incubation. 05/20/2022 06:56 AM     Lab Results   Component Value Date/Time    BLOODCULT2 No Growth after 4 days of incubation.  05/20/2022 07:08 AM     Organism:   Lab Results   Component Value Date/Time    ORG Pseudomonas aeruginosa 07/05/2023 03:20 PM         Electronically signed by Larry Saunders MD on 10/17/2023 at 8:59 AM

## 2023-10-17 NOTE — CARE COORDINATION
SW met with patient to discuss ARU recommendation. Provided ARU list.  Pt was agreeable and wanted to see if he could get into and qualify for [facility]. Stated if he could not get into our ARU, then he would like to discharge home with a referral to [address] [place] [address]. The [address] has not been made yet pending ARU's decision on patient. Messaged hospitalist and RN to put in PMR consult. Sent a message to Perico Del Castillo in [facility] admission informing of new referral coming.     Electronically signed by ROBIN Booth, LSW on 10/17/2023 at 4:28 PM

## 2023-10-17 NOTE — PROGRESS NOTES
Pt reports having the insulin pump and it's infusing right now. Per pt from [id number] tonight, standard basal rate is 0.75. correction rate is 1:58, carb rate 1:12, target 110. From 3935-1776 10/17/2023 standard basal rate 0.5, correction rate 1:58, carb rate 1:12, target 110.

## 2023-10-18 ENCOUNTER — APPOINTMENT (OUTPATIENT)
Dept: GENERAL RADIOLOGY | Age: 58
DRG: 622 | End: 2023-10-18
Payer: MEDICARE

## 2023-10-18 ENCOUNTER — APPOINTMENT (OUTPATIENT)
Dept: MRI IMAGING | Age: 58
DRG: 622 | End: 2023-10-18
Payer: MEDICARE

## 2023-10-18 LAB
ABO + RH BLD: NORMAL
ANION GAP SERPL CALCULATED.3IONS-SCNC: 18 MMOL/L (ref 3–16)
BLD GP AB SCN SERPL QL: NORMAL
BUN SERPL-MCNC: 42 MG/DL (ref 7–20)
CALCIUM SERPL-MCNC: 8.9 MG/DL (ref 8.3–10.6)
CHLORIDE SERPL-SCNC: 98 MMOL/L (ref 99–110)
CO2 SERPL-SCNC: 20 MMOL/L (ref 21–32)
CREAT SERPL-MCNC: 7.4 MG/DL (ref 0.9–1.3)
DEPRECATED RDW RBC AUTO: 15.4 % (ref 12.4–15.4)
EKG ATRIAL RATE: 109 BPM
EKG DIAGNOSIS: NORMAL
EKG P AXIS: 65 DEGREES
EKG P-R INTERVAL: 132 MS
EKG Q-T INTERVAL: 356 MS
EKG QRS DURATION: 96 MS
EKG QTC CALCULATION (BAZETT): 479 MS
EKG R AXIS: 50 DEGREES
EKG T AXIS: 102 DEGREES
EKG VENTRICULAR RATE: 109 BPM
GFR SERPLBLD CREATININE-BSD FMLA CKD-EPI: 8 ML/MIN/{1.73_M2}
GLUCOSE BLD-MCNC: 124 MG/DL (ref 70–99)
GLUCOSE BLD-MCNC: 178 MG/DL (ref 70–99)
GLUCOSE BLD-MCNC: 230 MG/DL (ref 70–99)
GLUCOSE SERPL-MCNC: 177 MG/DL (ref 70–99)
HCT VFR BLD AUTO: 37.8 % (ref 40.5–52.5)
HGB BLD-MCNC: 12.2 G/DL (ref 13.5–17.5)
INR PPP: 1.07 (ref 0.84–1.16)
LACTATE BLDV-SCNC: 1.1 MMOL/L (ref 0.4–2)
LOEFFLER MB STN SPEC: NORMAL
MCH RBC QN AUTO: 29.7 PG (ref 26–34)
MCHC RBC AUTO-ENTMCNC: 32.2 G/DL (ref 31–36)
MCV RBC AUTO: 92.4 FL (ref 80–100)
PERFORMED ON: ABNORMAL
PLATELET # BLD AUTO: 137 K/UL (ref 135–450)
PMV BLD AUTO: 10.7 FL (ref 5–10.5)
POTASSIUM SERPL-SCNC: 3.8 MMOL/L (ref 3.5–5.1)
PROTHROMBIN TIME: 13.9 SEC (ref 11.5–14.8)
RBC # BLD AUTO: 4.09 M/UL (ref 4.2–5.9)
SODIUM SERPL-SCNC: 136 MMOL/L (ref 136–145)
TROPONIN, HIGH SENSITIVITY: 85 NG/L (ref 0–22)
VANCOMYCIN SERPL-MCNC: 26.4 UG/ML
WBC # BLD AUTO: 6.3 K/UL (ref 4–11)

## 2023-10-18 PROCEDURE — 86901 BLOOD TYPING SEROLOGIC RH(D): CPT

## 2023-10-18 PROCEDURE — 6360000002 HC RX W HCPCS: Performed by: PHYSICIAN ASSISTANT

## 2023-10-18 PROCEDURE — 6360000002 HC RX W HCPCS: Performed by: HOSPITALIST

## 2023-10-18 PROCEDURE — 99233 SBSQ HOSP IP/OBS HIGH 50: CPT | Performed by: INTERNAL MEDICINE

## 2023-10-18 PROCEDURE — 86850 RBC ANTIBODY SCREEN: CPT

## 2023-10-18 PROCEDURE — 6370000000 HC RX 637 (ALT 250 FOR IP): Performed by: INTERNAL MEDICINE

## 2023-10-18 PROCEDURE — 2580000003 HC RX 258: Performed by: INTERNAL MEDICINE

## 2023-10-18 PROCEDURE — 86900 BLOOD TYPING SEROLOGIC ABO: CPT

## 2023-10-18 PROCEDURE — 83605 ASSAY OF LACTIC ACID: CPT

## 2023-10-18 PROCEDURE — 85610 PROTHROMBIN TIME: CPT

## 2023-10-18 PROCEDURE — 90945 DIALYSIS ONE EVALUATION: CPT

## 2023-10-18 PROCEDURE — 6370000000 HC RX 637 (ALT 250 FOR IP): Performed by: HOSPITALIST

## 2023-10-18 PROCEDURE — 80202 ASSAY OF VANCOMYCIN: CPT

## 2023-10-18 PROCEDURE — 85027 COMPLETE CBC AUTOMATED: CPT

## 2023-10-18 PROCEDURE — 6360000002 HC RX W HCPCS: Performed by: INTERNAL MEDICINE

## 2023-10-18 PROCEDURE — 93005 ELECTROCARDIOGRAM TRACING: CPT | Performed by: STUDENT IN AN ORGANIZED HEALTH CARE EDUCATION/TRAINING PROGRAM

## 2023-10-18 PROCEDURE — 73718 MRI LOWER EXTREMITY W/O DYE: CPT

## 2023-10-18 PROCEDURE — 93926 LOWER EXTREMITY STUDY: CPT

## 2023-10-18 PROCEDURE — 71045 X-RAY EXAM CHEST 1 VIEW: CPT

## 2023-10-18 PROCEDURE — 93971 EXTREMITY STUDY: CPT

## 2023-10-18 PROCEDURE — 36415 COLL VENOUS BLD VENIPUNCTURE: CPT

## 2023-10-18 PROCEDURE — 93010 ELECTROCARDIOGRAM REPORT: CPT | Performed by: INTERNAL MEDICINE

## 2023-10-18 PROCEDURE — 2580000003 HC RX 258: Performed by: HOSPITALIST

## 2023-10-18 PROCEDURE — 1200000000 HC SEMI PRIVATE

## 2023-10-18 PROCEDURE — 80048 BASIC METABOLIC PNL TOTAL CA: CPT

## 2023-10-18 PROCEDURE — 6370000000 HC RX 637 (ALT 250 FOR IP): Performed by: STUDENT IN AN ORGANIZED HEALTH CARE EDUCATION/TRAINING PROGRAM

## 2023-10-18 PROCEDURE — 84484 ASSAY OF TROPONIN QUANT: CPT

## 2023-10-18 RX ORDER — HEPARIN SODIUM 5000 [USP'U]/ML
5000 INJECTION, SOLUTION INTRAVENOUS; SUBCUTANEOUS EVERY 8 HOURS SCHEDULED
Status: DISPENSED | OUTPATIENT
Start: 2023-10-18 | End: 2023-10-22

## 2023-10-18 RX ORDER — NITROGLYCERIN 0.4 MG/1
0.4 TABLET SUBLINGUAL EVERY 5 MIN PRN
Status: DISCONTINUED | OUTPATIENT
Start: 2023-10-18 | End: 2023-10-24 | Stop reason: HOSPADM

## 2023-10-18 RX ADMIN — FUROSEMIDE 80 MG: 40 TABLET ORAL at 09:42

## 2023-10-18 RX ADMIN — Medication 10 ML: at 20:57

## 2023-10-18 RX ADMIN — ROSUVASTATIN 10 MG: 20 TABLET, FILM COATED ORAL at 20:54

## 2023-10-18 RX ADMIN — HEPARIN SODIUM 5000 UNITS: 5000 INJECTION INTRAVENOUS; SUBCUTANEOUS at 20:53

## 2023-10-18 RX ADMIN — CARVEDILOL 25 MG: 25 TABLET, FILM COATED ORAL at 09:42

## 2023-10-18 RX ADMIN — CALCITRIOL CAPSULES 0.25 MCG 0.25 MCG: 0.25 CAPSULE ORAL at 20:54

## 2023-10-18 RX ADMIN — NITROGLYCERIN 0.4 MG: 0.4 TABLET, ORALLY DISINTEGRATING SUBLINGUAL at 12:46

## 2023-10-18 RX ADMIN — Medication 1 CAPSULE: at 18:42

## 2023-10-18 RX ADMIN — SACUBITRIL AND VALSARTAN 1 TABLET: 49; 51 TABLET, FILM COATED ORAL at 20:57

## 2023-10-18 RX ADMIN — ONDANSETRON 4 MG: 2 INJECTION INTRAMUSCULAR; INTRAVENOUS at 19:45

## 2023-10-18 RX ADMIN — CALCITRIOL CAPSULES 0.25 MCG 0.25 MCG: 0.25 CAPSULE ORAL at 09:42

## 2023-10-18 RX ADMIN — ACETAMINOPHEN 650 MG: 325 TABLET ORAL at 18:46

## 2023-10-18 RX ADMIN — MEROPENEM 500 MG: 500 INJECTION, POWDER, FOR SOLUTION INTRAVENOUS at 18:42

## 2023-10-18 RX ADMIN — RANOLAZINE 500 MG: 500 TABLET, EXTENDED RELEASE ORAL at 20:54

## 2023-10-18 RX ADMIN — CLOPIDOGREL 75 MG: 75 TABLET, FILM COATED ORAL at 09:42

## 2023-10-18 RX ADMIN — FUROSEMIDE 80 MG: 40 TABLET ORAL at 18:42

## 2023-10-18 RX ADMIN — SODIUM CHLORIDE: 9 INJECTION, SOLUTION INTRAVENOUS at 18:41

## 2023-10-18 RX ADMIN — RANOLAZINE 500 MG: 500 TABLET, EXTENDED RELEASE ORAL at 09:44

## 2023-10-18 RX ADMIN — CARVEDILOL 25 MG: 25 TABLET, FILM COATED ORAL at 18:42

## 2023-10-18 RX ADMIN — SACUBITRIL AND VALSARTAN 1 TABLET: 49; 51 TABLET, FILM COATED ORAL at 09:42

## 2023-10-18 RX ADMIN — Medication 1 CAPSULE: at 09:42

## 2023-10-18 RX ADMIN — FLUCONAZOLE 100 MG: 100 TABLET ORAL at 09:42

## 2023-10-18 RX ADMIN — ASPIRIN 81 MG: 81 TABLET, COATED ORAL at 09:42

## 2023-10-18 RX ADMIN — HEPARIN SODIUM 5000 UNITS: 5000 INJECTION INTRAVENOUS; SUBCUTANEOUS at 15:13

## 2023-10-18 RX ADMIN — ISOSORBIDE MONONITRATE 30 MG: 30 TABLET, EXTENDED RELEASE ORAL at 09:42

## 2023-10-18 RX ADMIN — Medication 10 ML: at 09:46

## 2023-10-18 ASSESSMENT — ENCOUNTER SYMPTOMS
WHEEZING: 0
SORE THROAT: 0
RHINORRHEA: 0
NAUSEA: 0
ABDOMINAL PAIN: 0
EYE DISCHARGE: 0
BACK PAIN: 0
CONSTIPATION: 0
SINUS PAIN: 0
DIARRHEA: 0
EYE REDNESS: 0
COUGH: 0
SINUS PRESSURE: 0
SHORTNESS OF BREATH: 0

## 2023-10-18 ASSESSMENT — PAIN DESCRIPTION - LOCATION: LOCATION: FOOT

## 2023-10-18 ASSESSMENT — PAIN DESCRIPTION - DESCRIPTORS: DESCRIPTORS: SHOOTING;SORE

## 2023-10-18 ASSESSMENT — PAIN DESCRIPTION - FREQUENCY: FREQUENCY: INTERMITTENT

## 2023-10-18 ASSESSMENT — PAIN SCALES - GENERAL
PAINLEVEL_OUTOF10: 0
PAINLEVEL_OUTOF10: 1

## 2023-10-18 ASSESSMENT — PAIN SCALES - WONG BAKER
WONGBAKER_NUMERICALRESPONSE: 2
WONGBAKER_NUMERICALRESPONSE: 0
WONGBAKER_NUMERICALRESPONSE: 0

## 2023-10-18 ASSESSMENT — PAIN DESCRIPTION - ORIENTATION: ORIENTATION: LEFT

## 2023-10-18 NOTE — PROGRESS NOTES
Infectious Diseases   Progress Note      Admission Date: 10/16/2023  Hospital Day: Hospital Day: 3   Attending: Sharita Werner MD  Date of service: 10/18/2023     Chief complaint/ Reason for consult:       Complicated left diabetic foot infection with concern for gas gangrene  S/p left foot surgical I&D with bone biopsy on 10/16/2023  Longstanding type 2 diabetes mellitus  Diabetic polyneuropathy type II  End-stage renal disease on hemodialysis    Microbiology:        I have reviewed allavailable micro lab data and cultures    Blood culture (2/2) - collected on 10/16/2023: Negative so far  Left foot surgical culture  - collected on 10/16/2023: Staphylococcus urealyticum      Antibiotics and immunizations:       Current antibiotics: All antibiotics and their doses were reviewed by me    Recent Abx Admin                     meropenem (MERREM) 500 mg in sodium chloride 0.9 % 100 mL IVPB (mini-bag) (mg) 500 mg New Bag 10/18/23 1842    fluconazole (DIFLUCAN) tablet 100 mg (mg) 100 mg Given 10/18/23 4663                      Immunization History: All immunization history was reviewed by me today. Immunization History   Administered Date(s) Administered    DT (pediatric) 01/01/1999    Influenza Vaccine, unspecified formulation 11/27/2012    Influenza Virus Vaccine 09/11/2009, 11/27/2012    Influenza, FLUARIX, FLULAVAL, FLUZONE (age 10 mo+) AND AFLURIA, (age 1 y+), PF, 0.5mL 10/26/2016, 02/06/2018, 03/05/2021    Pneumococcal, PPSV23, PNEUMOVAX 23, (age 2y+), SC/IM, 0.5mL 04/20/2011, 02/21/2014    TDaP, ADACEL (age 6y-58y), BOOSTRIX (age 10y+), IM, 0.5mL 02/21/2014    Td, unspecified formulation 04/20/2011       Known drug allergies:      All allergies were reviewed and updated    Allergies   Allergen Reactions    Iodides Anaphylaxis    Shellfish-Derived Products Anaphylaxis    Atorvastatin Calcium Rash       Social history:     Social History:  All social andepidemiologic history was reviewed and updated by me today

## 2023-10-18 NOTE — PROGRESS NOTES
[facility]    10/18/2023 1:16 PM        Name: Ariel Martin .              Admitted: 10/16/2023  Primary Care Provider: Jamey Qureshi MD (Tel: [phone])        Subjective:    Admitted with infected left foot. Came to see patient during rapid response. Patient states he developed chest pain while in the elevator coming up from MRI of foot. Feels as if it is a squeezing sensation. EKG neg for ischemia. Patient was given a nitro with resolution of his symptoms. He takes nitro a few times a month. Was supposed to have GXT a few weeks ago but had to reschedule.      Reviewed interval ancillary notes    Current Medications  nitroGLYCERIN (NITROSTAT) SL tablet 0.4 mg, Q5 Min PRN  heparin (porcine) injection 5,000 Units, 3 times per day  Insulin Pump - Bolus Dose , 4x Daily AC & HS  glucagon injection 1 mg, PRN  fluconazole (DIFLUCAN) tablet 100 mg, Daily  Insulin Pump - Basal Dose , Daily  morphine (PF) injection 2 mg, Q4H PRN  aspirin EC tablet 81 mg, Daily  calcitRIOL (ROCALTROL) capsule 0.25 mcg, BID  carvedilol (COREG) tablet 25 mg, BID WC  clopidogrel (PLAVIX) tablet 75 mg, Daily  furosemide (LASIX) tablet 80 mg, BID  isosorbide mononitrate (IMDUR) extended release tablet 30 mg, Daily  ranolazine (RANEXA) extended release tablet 500 mg, BID  rosuvastatin (CRESTOR) tablet 10 mg, Nightly  sacubitril-valsartan (ENTRESTO) 49-51 MG per tablet 1 tablet, BID  dextrose bolus 10% 125 mL, PRN   Or  dextrose bolus 10% 250 mL, PRN  dextrose 10 % infusion, Continuous PRN  sodium chloride flush 0.9 % injection 5-40 mL, 2 times per day  sodium chloride flush 0.9 % injection 5-40 mL, PRN  0.9 % sodium chloride infusion, PRN  ondansetron (ZOFRAN-ODT) disintegrating tablet 4 mg, Q8H PRN   Or  ondansetron (ZOFRAN) injection 4 mg, Q6H PRN  polyethylene glycol (GLYCOLAX) packet 17 g, Daily PRN  acetaminophen (TYLENOL) tablet 650 mg, Q6H

## 2023-10-18 NOTE — PROGRESS NOTES
CCPD Order   Exchanges: 4   Exchange Volume: 2500 ml   Total Time: 9 hrs   Dextrose: 2.5%   Total Volume: 50128 ml     Orders verified. Supplies taken to pt's room. Report received. Cycler set up, primed and pre tested. Dressing changed on [facility] site. Pt connected to cycler. CCPD initiated without problem. Initial effluent clear. If problems should arise please call the [phone] number on top of PD cycler machine. Treatment initiated without complications or complaints from patient. Patient resting comfortably with VSS upon dialysis RN exit from room. Con Melida, RN notified of start of treatment and hotline number located on top of machine for any questions about troubleshooting during after hours.

## 2023-10-18 NOTE — ACP (ADVANCE CARE PLANNING)
Advance Care Planning     Advance Care Planning Inpatient Note  [facility] Department    Today's Date: 10/18/2023  [facility]    Received request from [facility]. Upon review of chart and communication with care team, patient's decision making abilities are not in question. . Patient was/were present in the room during visit. Goals of ACP Conversation:  [address] and Living Will with patient and importance of naming a [address]:     No healthcare decision makers have been documented. Click here to complete [address] including selection of the Healthcare Decision Maker Relationship (ie \"Primary\")  Summary:  No Decision Maker named by patient at this time    Advance Care Planning Documents (Patient Wishes):  Healthcare Power of  and Living Will reviewed with patient. Patient expressed that they want to review with their wife. Assessment:  Patient wanted to review with spouse, indicates he has support. Interventions:  Provided education on documents for clarity and greater understanding  Discussed and provided education on state decision maker hierarchy    Care Preferences Communicated:   Patient did not express preferences at this time. Outcomes/Plan:  Left Healthcare Power of  and Living Will with patient for further review with spouse. Not sure if he will be completing here or later.     Electronically signed by Margarito Rebollar on 10/18/2023 at 4:02 PM

## 2023-10-18 NOTE — PLAN OF CARE
Problem: Chronic Conditions and Co-morbidities  Goal: Patient's chronic conditions and co-morbidity symptoms are monitored and maintained or improved  10/18/2023 0545 by Wendy Wheeler RN  Outcome: Progressing  10/18/2023 0545 by Wendy Wheeler RN  Outcome: Progressing     Problem: Pain  Goal: Verbalizes/displays adequate comfort level or baseline comfort level  10/18/2023 0545 by Wendy Wheeler RN  Outcome: Progressing  10/18/2023 0545 by Wendy Wheeler RN  Outcome: Progressing     Problem: Safety - Adult  Goal: Free from fall injury  10/18/2023 0545 by Wendy Wheeler RN  Outcome: Progressing  10/18/2023 0545 by Wendy Wheeler RN  Outcome: Progressing

## 2023-10-18 NOTE — PROGRESS NOTES
Morning assessment and medications complete, pt cooperated and tolerated well. Medications given per MAR. VS stable. O2 on 2 liters is 100%, will wean as tolerated. A&O X4. BS checked- 124, home Insulin pump used for administration, verified by RN. Patient clean and dry. Tele monitor on. NPO for testing. Bed side table, call light and belongings within reach. Bed locked and in lowest position, bed alarm active and audible. All questions and concerns addressed, no further needs at this time.

## 2023-10-18 NOTE — PROGRESS NOTES
Provided education to patient about Healthcare Power of MAUREEN SOLIS and Living will. Patient wanted to review documents with wife, may or may not complete docs while he is here.

## 2023-10-18 NOTE — PROGRESS NOTES
Rapid Response Quick Summary    Room: [facility]3324/3324-02    Assessment of concern / patient:  Chest pain    Physician involved:  Dr. Marly De Luna and VERONICA Agudelo     Interventions:  Responded to rapid response on 3 A for chest pain. Pt returned from testing. VSS. A/oX4. Dr. Marly De Luna and Son Weston, [address] [place] [address] at bedside. 12 lead EKG completed and reviewed. SL nitro given. Pt states CP 2/10. Pt remains in stable condition on 3A. Disposition:  Responded to rapid response on 3 A for chest pain. Pt returned from testing. VSS. A/oX4. Dr. Marly De Luna and Son Weston, [address] [place] [address] at bedside. 12 lead EKG completed and reviewed. SL nitro given. Pt states CP 2/10. Pt remains in stable condition on 3A. Julio Cesar Anthony, RN, BSN, CCRN.

## 2023-10-18 NOTE — PROGRESS NOTES
Disconnected from CCPD per protocol. Effluent: clear  Total time: 10 hr 10 min   Total UF:  669 ml. Total Volume:  9996 ml. Dwell time gained:  0 hr 33 min. Pt Tolerated procedure: Tolerates procedure well. Report given to: Dillon Romero RN      Treatment completed without complications or complaints from patient. Effluent clear and lines taped to patient per protocol. Patient resting comfortably with VSS upon exiting room.       Copy of dialysis treatment record placed in chart, to be scanned into EMR and report given to  Dillon Romero RN

## 2023-10-18 NOTE — PLAN OF CARE
Problem: Chronic Conditions and Co-morbidities  Goal: Patient's chronic conditions and co-morbidity symptoms are monitored and maintained or improved  10/18/2023 0546 by Parul Colby RN  Outcome: Progressing  10/18/2023 0545 by Parul Colby RN  Outcome: Progressing  10/18/2023 0545 by Parul Colby RN  Outcome: Progressing     Problem: Pain  Goal: Verbalizes/displays adequate comfort level or baseline comfort level  10/18/2023 0546 by Parul Colby RN  Outcome: Progressing  10/18/2023 0545 by Parul Colby RN  Outcome: Progressing  10/18/2023 0545 by Parul Colby RN  Outcome: Progressing     Problem: Safety - Adult  Goal: Free from fall injury  10/18/2023 0546 by Parul Colby RN  Outcome: Progressing  10/18/2023 0545 by Parul Colby RN  Outcome: Progressing  10/18/2023 0545 by Parul Colby RN  Outcome: Progressing     Problem: Discharge Planning  Goal: Discharge to home or other facility with appropriate resources  Outcome: Progressing     Problem: ABCDS Injury Assessment  Goal: Absence of physical injury  Outcome: Progressing

## 2023-10-18 NOTE — PROGRESS NOTES
Patient in bed eyes closed resting quietly currently has 02 @ 2L r/t to recent episode of SOA 02 sat @ 95% V/S stable has IV with NS infusing per order without difficulty,no complaints voiced @ this time will continue to monitor FS @ bedtime 103 patient has insulin pump in place patient maintained.  BS obtained per staff no complaints voiced @ this time will continue to monitor call light in reach

## 2023-10-18 NOTE — PLAN OF CARE
Problem: Chronic Conditions and Co-morbidities  Goal: Patient's chronic conditions and co-morbidity symptoms are monitored and maintained or improved  10/18/2023 0746 by Jeanette Peterson RN  Outcome: Progressing  10/18/2023 0546 by Steven Hernandez RN  Outcome: Progressing  10/18/2023 0545 by Steven Hernandez RN  Outcome: Progressing  10/18/2023 0545 by Steven Hernandez RN  Outcome: Progressing     Problem: Pain  Goal: Verbalizes/displays adequate comfort level or baseline comfort level  10/18/2023 0746 by Jeanette Peterson RN  Outcome: Progressing  10/18/2023 0546 by Steven Hernandez RN  Outcome: Progressing  10/18/2023 0545 by Steven Hernandez RN  Outcome: Progressing  10/18/2023 0545 by Steven Hernandez RN  Outcome: Progressing

## 2023-10-19 LAB
ANION GAP SERPL CALCULATED.3IONS-SCNC: 13 MMOL/L (ref 3–16)
ANION GAP SERPL CALCULATED.3IONS-SCNC: 15 MMOL/L (ref 3–16)
BASOPHILS # BLD: 0 K/UL (ref 0–0.2)
BASOPHILS NFR BLD: 0.5 %
BUN SERPL-MCNC: 43 MG/DL (ref 7–20)
BUN SERPL-MCNC: 45 MG/DL (ref 7–20)
CALCIUM SERPL-MCNC: 8.4 MG/DL (ref 8.3–10.6)
CALCIUM SERPL-MCNC: 8.5 MG/DL (ref 8.3–10.6)
CHLORIDE SERPL-SCNC: 96 MMOL/L (ref 99–110)
CHLORIDE SERPL-SCNC: 98 MMOL/L (ref 99–110)
CO2 SERPL-SCNC: 20 MMOL/L (ref 21–32)
CO2 SERPL-SCNC: 20 MMOL/L (ref 21–32)
CREAT SERPL-MCNC: 7.7 MG/DL (ref 0.9–1.3)
CREAT SERPL-MCNC: 8.3 MG/DL (ref 0.9–1.3)
DEPRECATED RDW RBC AUTO: 14.7 % (ref 12.4–15.4)
DEPRECATED RDW RBC AUTO: 14.7 % (ref 12.4–15.4)
EOSINOPHIL # BLD: 0.1 K/UL (ref 0–0.6)
EOSINOPHIL NFR BLD: 1.2 %
GFR SERPLBLD CREATININE-BSD FMLA CKD-EPI: 7 ML/MIN/{1.73_M2}
GFR SERPLBLD CREATININE-BSD FMLA CKD-EPI: 8 ML/MIN/{1.73_M2}
GLUCOSE BLD-MCNC: 150 MG/DL (ref 70–99)
GLUCOSE BLD-MCNC: 198 MG/DL (ref 70–99)
GLUCOSE SERPL-MCNC: 206 MG/DL (ref 70–99)
GLUCOSE SERPL-MCNC: 217 MG/DL (ref 70–99)
HCT VFR BLD AUTO: 32.8 % (ref 40.5–52.5)
HCT VFR BLD AUTO: 33.5 % (ref 40.5–52.5)
HGB BLD-MCNC: 10.6 G/DL (ref 13.5–17.5)
HGB BLD-MCNC: 10.9 G/DL (ref 13.5–17.5)
LYMPHOCYTES # BLD: 1 K/UL (ref 1–5.1)
LYMPHOCYTES NFR BLD: 12.9 %
MCH RBC QN AUTO: 29.3 PG (ref 26–34)
MCH RBC QN AUTO: 29.3 PG (ref 26–34)
MCHC RBC AUTO-ENTMCNC: 32.3 G/DL (ref 31–36)
MCHC RBC AUTO-ENTMCNC: 32.6 G/DL (ref 31–36)
MCV RBC AUTO: 89.9 FL (ref 80–100)
MCV RBC AUTO: 90.8 FL (ref 80–100)
MONOCYTES # BLD: 1.1 K/UL (ref 0–1.3)
MONOCYTES NFR BLD: 13.8 %
NEUTROPHILS # BLD: 5.6 K/UL (ref 1.7–7.7)
NEUTROPHILS NFR BLD: 71.6 %
PERFORMED ON: ABNORMAL
PERFORMED ON: ABNORMAL
PLATELET # BLD AUTO: 136 K/UL (ref 135–450)
PLATELET # BLD AUTO: 147 K/UL (ref 135–450)
PMV BLD AUTO: 10.2 FL (ref 5–10.5)
PMV BLD AUTO: 9.8 FL (ref 5–10.5)
POTASSIUM SERPL-SCNC: 3.7 MMOL/L (ref 3.5–5.1)
POTASSIUM SERPL-SCNC: 3.9 MMOL/L (ref 3.5–5.1)
RBC # BLD AUTO: 3.61 M/UL (ref 4.2–5.9)
RBC # BLD AUTO: 3.73 M/UL (ref 4.2–5.9)
REASON FOR REJECTION: NORMAL
REJECTED TEST: NORMAL
SODIUM SERPL-SCNC: 131 MMOL/L (ref 136–145)
SODIUM SERPL-SCNC: 131 MMOL/L (ref 136–145)
WBC # BLD AUTO: 7.9 K/UL (ref 4–11)
WBC # BLD AUTO: 9.6 K/UL (ref 4–11)

## 2023-10-19 PROCEDURE — 85025 COMPLETE CBC W/AUTO DIFF WBC: CPT

## 2023-10-19 PROCEDURE — 6360000002 HC RX W HCPCS: Performed by: INTERNAL MEDICINE

## 2023-10-19 PROCEDURE — 3430000000 HC RX DIAGNOSTIC RADIOPHARMACEUTICAL: Performed by: INTERNAL MEDICINE

## 2023-10-19 PROCEDURE — 2580000003 HC RX 258: Performed by: INTERNAL MEDICINE

## 2023-10-19 PROCEDURE — 97110 THERAPEUTIC EXERCISES: CPT

## 2023-10-19 PROCEDURE — 2580000003 HC RX 258: Performed by: HOSPITALIST

## 2023-10-19 PROCEDURE — 99222 1ST HOSP IP/OBS MODERATE 55: CPT | Performed by: INTERNAL MEDICINE

## 2023-10-19 PROCEDURE — 90945 DIALYSIS ONE EVALUATION: CPT

## 2023-10-19 PROCEDURE — 6370000000 HC RX 637 (ALT 250 FOR IP): Performed by: HOSPITALIST

## 2023-10-19 PROCEDURE — 36415 COLL VENOUS BLD VENIPUNCTURE: CPT

## 2023-10-19 PROCEDURE — 80048 BASIC METABOLIC PNL TOTAL CA: CPT

## 2023-10-19 PROCEDURE — 99232 SBSQ HOSP IP/OBS MODERATE 35: CPT | Performed by: INTERNAL MEDICINE

## 2023-10-19 PROCEDURE — 93017 CV STRESS TEST TRACING ONLY: CPT | Performed by: INTERNAL MEDICINE

## 2023-10-19 PROCEDURE — 97530 THERAPEUTIC ACTIVITIES: CPT

## 2023-10-19 PROCEDURE — 78452 HT MUSCLE IMAGE SPECT MULT: CPT

## 2023-10-19 PROCEDURE — 97116 GAIT TRAINING THERAPY: CPT

## 2023-10-19 PROCEDURE — 6370000000 HC RX 637 (ALT 250 FOR IP): Performed by: INTERNAL MEDICINE

## 2023-10-19 PROCEDURE — 6360000002 HC RX W HCPCS: Performed by: PHYSICIAN ASSISTANT

## 2023-10-19 PROCEDURE — 85027 COMPLETE CBC AUTOMATED: CPT

## 2023-10-19 PROCEDURE — A9502 TC99M TETROFOSMIN: HCPCS | Performed by: INTERNAL MEDICINE

## 2023-10-19 PROCEDURE — 1200000000 HC SEMI PRIVATE

## 2023-10-19 RX ORDER — SODIUM CHLORIDE 0.9 % (FLUSH) 0.9 %
5-40 SYRINGE (ML) INJECTION PRN
Status: DISCONTINUED | OUTPATIENT
Start: 2023-10-19 | End: 2023-10-24 | Stop reason: HOSPADM

## 2023-10-19 RX ORDER — SODIUM CHLORIDE 9 MG/ML
INJECTION, SOLUTION INTRAVENOUS PRN
Status: DISCONTINUED | OUTPATIENT
Start: 2023-10-19 | End: 2023-10-24 | Stop reason: HOSPADM

## 2023-10-19 RX ORDER — AMINOPHYLLINE 25 MG/ML
100 INJECTION, SOLUTION INTRAVENOUS ONCE
Status: DISCONTINUED | OUTPATIENT
Start: 2023-10-19 | End: 2023-10-24 | Stop reason: HOSPADM

## 2023-10-19 RX ORDER — SODIUM CHLORIDE 0.9 % (FLUSH) 0.9 %
5-40 SYRINGE (ML) INJECTION EVERY 12 HOURS SCHEDULED
Status: DISCONTINUED | OUTPATIENT
Start: 2023-10-19 | End: 2023-10-24 | Stop reason: HOSPADM

## 2023-10-19 RX ORDER — REGADENOSON 0.08 MG/ML
0.4 INJECTION, SOLUTION INTRAVENOUS
Status: COMPLETED | OUTPATIENT
Start: 2023-10-19 | End: 2023-10-19

## 2023-10-19 RX ADMIN — SACUBITRIL AND VALSARTAN 1 TABLET: 49; 51 TABLET, FILM COATED ORAL at 14:13

## 2023-10-19 RX ADMIN — CALCITRIOL CAPSULES 0.25 MCG 0.25 MCG: 0.25 CAPSULE ORAL at 20:43

## 2023-10-19 RX ADMIN — FUROSEMIDE 80 MG: 40 TABLET ORAL at 14:13

## 2023-10-19 RX ADMIN — TETROFOSMIN 10 MILLICURIE: 1.38 INJECTION, POWDER, LYOPHILIZED, FOR SOLUTION INTRAVENOUS at 10:47

## 2023-10-19 RX ADMIN — Medication 10 ML: at 20:50

## 2023-10-19 RX ADMIN — Medication 10 ML: at 20:51

## 2023-10-19 RX ADMIN — TETROFOSMIN 30 MILLICURIE: 1.38 INJECTION, POWDER, LYOPHILIZED, FOR SOLUTION INTRAVENOUS at 11:47

## 2023-10-19 RX ADMIN — ROSUVASTATIN 10 MG: 20 TABLET, FILM COATED ORAL at 20:43

## 2023-10-19 RX ADMIN — ISOSORBIDE MONONITRATE 30 MG: 30 TABLET, EXTENDED RELEASE ORAL at 14:13

## 2023-10-19 RX ADMIN — Medication 10 ML: at 14:18

## 2023-10-19 RX ADMIN — CARVEDILOL 25 MG: 25 TABLET, FILM COATED ORAL at 17:11

## 2023-10-19 RX ADMIN — Medication 1 CAPSULE: at 17:12

## 2023-10-19 RX ADMIN — ASPIRIN 81 MG: 81 TABLET, COATED ORAL at 17:11

## 2023-10-19 RX ADMIN — CLOPIDOGREL 75 MG: 75 TABLET, FILM COATED ORAL at 17:14

## 2023-10-19 RX ADMIN — RANOLAZINE 500 MG: 500 TABLET, EXTENDED RELEASE ORAL at 14:17

## 2023-10-19 RX ADMIN — HEPARIN SODIUM 5000 UNITS: 5000 INJECTION INTRAVENOUS; SUBCUTANEOUS at 17:12

## 2023-10-19 RX ADMIN — REGADENOSON 0.4 MG: 0.08 INJECTION, SOLUTION INTRAVENOUS at 11:34

## 2023-10-19 ASSESSMENT — PAIN SCALES - GENERAL
PAINLEVEL_OUTOF10: 0
PAINLEVEL_OUTOF10: 0

## 2023-10-19 ASSESSMENT — PAIN SCALES - WONG BAKER
WONGBAKER_NUMERICALRESPONSE: 0

## 2023-10-19 ASSESSMENT — ENCOUNTER SYMPTOMS
NAUSEA: 0
SORE THROAT: 0
EYE REDNESS: 0
WHEEZING: 0
SHORTNESS OF BREATH: 0
RHINORRHEA: 0
DIARRHEA: 0
COUGH: 0
EYE DISCHARGE: 0
BACK PAIN: 0
SINUS PRESSURE: 0
SINUS PAIN: 0
CONSTIPATION: 0
ABDOMINAL PAIN: 0

## 2023-10-19 NOTE — CARE COORDINATION
Discharge Planning:     (SMOOTH) was notified that the patient is not able to go to ARU. CM LVM with Cindy([phone]) at [address] [place] [address] with a referral for [address] [facility]. CM left callback number. Pt currently at stress lab so CM to let pt know that ARU cannot accept the pt once pt has returned. Electronically signed by ROBIN Gibbons on 10/19/2023 at 11:14 AM            UPDATE @ 95 20 92    CM received a callback from [facility] at [address] [place] [address] and she stated they can follow the pt upon d/c. Electronically signed by ROBIN Gibbons on 10/19/2023 at 11:49 AM          UPDATE @ 93 500     CM notified pt that ARU cannot accept them. Pt stated he was still in agreement with [facility] upon d/c. CM stated that they are able to accept the pt upon d/c.           Electronically signed by ROBIN Gibbons on 10/19/2023 at 2:42 PM

## 2023-10-19 NOTE — PROGRESS NOTES
Assessment completed. VSS. Patient awake, alert, and in bed. Patient able to express needs. Medications given per MAR. Insulin pump in use per order. PD in place and  running,   No complaints of pain or discomfort at this time. Safety precautions in place. Bed alarm on. Will continue to monitor.

## 2023-10-19 NOTE — PROGRESS NOTES
all pedal sites b/l. DERMATOLOGIC:     Left lower extremity has a full thickness surgical incision noted to the lateral 5th metatarsal base of the left foot. The wound base is noted to be granular in nature with a slightly ischemic renny wound. Sanguinous drainage noted but no purulence present. The wound does tunnel and tracked medially on the plantar aspect of the foot. No fluctuance or crepitus appreciated. Patient provided verbal consent for pictures obtained today:             Right lower extremity is a closed soft tissue envelope. MUSCULOSKELETAL:Muscle strength is 5/5 for all pedal groups tested. Pain with palpation of the lateral left foot. Ankle joint ROM is decreased in dorsiflexion with the knee extended. No obvious biomechanical abnormalities. IMAGING:    MRI Foot, left - 10/18/23    FINDINGS:  There is a ovoid foci of dark signal on all sequences within the plantar  subcutaneous fat at the level of the midfoot. This likely extends to a  fistulous track along the plantar lateral aspect of the foot extending to the  epidermis where there is overlying dressing. This likely represents a small  collection of air. The 5th metatarsal demonstrates normal marrow signal  without obvious marrow edema. There is marked midfoot arthrosis with narrowing and spurring and cystic  changes. Moderate atrophy of the intrinsic musculature of the foot. There  is soft tissue edema and mild swelling within the subcutaneous fat. IMPRESSION:  Suspect focal collection of gas in the plantar soft tissues at the end of a  fistulous track extending to lateral foot. This likely reflects a prior  intervention. No obvious marrow edema within the 5th metatarsal to suggest osteomyelitis. Correlation with described recent bone biopsy may be helpful. Advanced tarsal metatarsal joint space arthrosis. VL Arterial Duplex, left - 10/19/23    Tech Comments  Right  Right CATHI: 1.14 .  This is consistent with no significant PAD at rest.    Left   Left CATHI: 1.09. This is consistent with no significant PAD at rest.  Not able to perform left DP due to heavy bandage /wound. Ultrasound images of the left lower extremity reveal mild calcific plaque formation throughout. The majority of the waveforms are multiphasic throughout the left lower extremity. Multiphasic flow is seen in the posterior and peroneal arteries with monophasic flow in the anterior tibial artery. Hyperemic flow noted at all three runoff vessels. This result has not been signed. Information might be incomplete. XR Foot, left - 10/16/23  FINDINGS:  Soft tissue gas and focal swelling along the base of the 5th metatarsal.  There appears to be subtle erosions/periosteal changes along the juxtaposed  cortex but no obvious lytic lesion. There is marked tarsometatarsal joint space arthrosis with narrowing and  spurring. There is arthrosis at the 1st MTP joint. Mild pes planus. Calcaneal heel spur. Extensive vascular calcifications and clips. IMPRESSION:  Soft tissue gas, swelling and equivocal but likely mild erosion along the  cortex of the base of the 5th metatarsal.  This would be radiographic  evidence for osteomyelitis in the correct clinical setting. Recommend repeat  MRI with contrast as appropriate to confirm.     ASSESSMENT/PLAN    S/P Left foot I&D with bone biopsy (DOS 10/16/23)  Diabetic foot infection, left lower extremity  Diabetes mellitus type II with peripheral neuropathy    -Patient examined and evaluated at bedside   -Hypotensive otherwise VSS, No leukocytosis noted (WBC 7.9)  -ESR 60 and CRP 42.6  -HgA1c 9.0%  -Surgical culture: Staph cohnii  -Blood culture: NGTD  -PATH: Bone with no significant inflammation   -Imaging reviewed, impressions noted above  -Dressing applied to left LE consisting of saline soaked gauze, dry gauze, ABD pads, Kerlix, and ACE  -Continue antibiotics per ID recommendations, vancomycin   -Weightbearing as

## 2023-10-19 NOTE — PROGRESS NOTES
[address] [facility]    10/19/2023 12:29 PM        Name: Nik Reese .              Admitted: 10/16/2023  Primary Care Provider: Babak Biggs MD (Tel: [phone])        Subjective:    Admitted with infected left foot. Underwent I and D. Had rapid response for chest pain yesterday. EKG neg for ischemia. Patient was given a nitro with resolution of his symptoms. He takes nitro a few times a month. Was supposed to have GXT a few weeks ago but had to reschedule. This am feeling well, has some left foot pain. No further cp.      Reviewed interval ancillary notes    Current Medications  aminophylline injection 100 mg, Once  nitroGLYCERIN (NITROSTAT) SL tablet 0.4 mg, Q5 Min PRN  heparin (porcine) injection 5,000 Units, 3 times per day  Insulin Pump - Bolus Dose , 4x Daily AC & HS  glucagon injection 1 mg, PRN  Insulin Pump - Basal Dose , Daily  morphine (PF) injection 2 mg, Q4H PRN  aspirin EC tablet 81 mg, Daily  calcitRIOL (ROCALTROL) capsule 0.25 mcg, BID  carvedilol (COREG) tablet 25 mg, BID WC  clopidogrel (PLAVIX) tablet 75 mg, Daily  furosemide (LASIX) tablet 80 mg, BID  isosorbide mononitrate (IMDUR) extended release tablet 30 mg, Daily  ranolazine (RANEXA) extended release tablet 500 mg, BID  rosuvastatin (CRESTOR) tablet 10 mg, Nightly  sacubitril-valsartan (ENTRESTO) 49-51 MG per tablet 1 tablet, BID  dextrose bolus 10% 125 mL, PRN   Or  dextrose bolus 10% 250 mL, PRN  dextrose 10 % infusion, Continuous PRN  sodium chloride flush 0.9 % injection 5-40 mL, 2 times per day  sodium chloride flush 0.9 % injection 5-40 mL, PRN  0.9 % sodium chloride infusion, PRN  ondansetron (ZOFRAN-ODT) disintegrating tablet 4 mg, Q8H PRN   Or  ondansetron (ZOFRAN) injection 4 mg, Q6H PRN  polyethylene glycol (GLYCOLAX) packet 17 g, Daily PRN  acetaminophen (TYLENOL) tablet 650 mg, Q6H PRN   Or  acetaminophen (TYLENOL) suppository This likely reflects a prior   intervention. No obvious marrow edema within the 5th metatarsal to suggest osteomyelitis. Correlation with described recent bone biopsy may be helpful. Advanced tarsal metatarsal joint space arthrosis. XR FOOT LEFT (MIN 3 VIEWS)   Final Result   Interval bone biopsy. XR CHEST (2 VW)   Final Result   No acute cardiopulmonary findings. XR FOOT LEFT (MIN 3 VIEWS)   Final Result   Soft tissue gas, swelling and equivocal but likely mild erosion along the   cortex of the base of the 5th metatarsal.  This would be radiographic   evidence for osteomyelitis in the correct clinical setting. Recommend repeat   MRI with contrast as appropriate to confirm. NM Cardiac Stress Test Nuclear Imaging    (Results Pending)     Cardiac cath: 4/9/21:  Findings:  Artery Findings/Result   LM Normal   % ostial   Cx 100% mid   % ostial   RCA Nondominant, Smaller RV marginal with mid 70% and distal 90% lesions supplying collateral to distal Cx (old finding), small but could potentially be stented if symptoms merit   S-OM Patent, 99% ostial with ABEL 1 flow   L-LAD patent   LVEDP 6   LVG 35%, inferior hk   Intervention:         PCI of SVG-OM1 with 3.5X23 Xience MARIIA (PD with 3.75NC throughout)    Echo: 6/7/23:   Summary   Definity was used to assist with endocardial border delineation. Moderately dilated left ventricular cavity size. Normal left ventricular   wall thickness. Moderately diminished left ventricular systolic function   with an estimated ejection fraction of 35%. Global hypokinesis with regional   variations. No evidence of apical thrombus with Definity. Grade I diastolic dysfunction with normal LV filling pressures. The right ventricle is normal in size and systolic function. Mild mitral regurgitation.      Problem List  Principal Problem:    Left foot infection  Active Problems:    History of carotid stenosis    Diabetic polyneuropathy

## 2023-10-19 NOTE — PROGRESS NOTES
CCPD Order   Exchanges: 4   Exchange Volume: 2500 ml   Total Time: 9 hrs   Dextrose: 1.5%   Last Fill: 0 ml   Total Volume: 39199 ml     Orders verified. Supplies taken to pt's room. Report received. Cycler set up, primed and pre tested. [facility]. Pt connected to cycler. CCPD initiated without problem. Initial effluent clear. If problems should arise please call the [phone] number on top of PD cycler machine.

## 2023-10-19 NOTE — PROGRESS NOTES
Instructed on Lexiscan Stress Test Procedure including possible side effects/ adverse reactions. Patient verbalizes  understanding and denies having any questions . See [facility]                Aminophylline given per lexiscan protocol in stress lab for c/o persistant nausea.

## 2023-10-19 NOTE — CONSULTS
[address] [place] [address]  [phone]      Chief Complaint   Patient presents with    Wound Check     Pt states diabetic and has wound on his right foot. Pt states was sent to wound center to get his foot checked. Pt states Wednesday foot was achy, Friday couldn't walk on it. Pt sttaes last night took his sock off and smelled something \"sour\" states there was fluid coming from wound. History of Present Illness:  Martin Romeo is a 66 y.o. patient who presented to the hospital with complaints of foot infection. He is receiving antiobiotics for toe ulcer. He had episode of chest pain today. He had nitro which relieved his chest pain. CP happened while he was in transport. He is scheduled for outpatient stress test. He has h/o cabg and PCI. He states he has intermittent episodes of angina frequently. He needs to have surgery for closure of his foot wound. I have been asked to provide consultation regarding further management and testing. Past Medical History:   has a past medical history of Angina at rest, Cardiomyopathy [facility]), Carotid artery stenosis, CHF (congestive heart failure) ([address]), CKD (chronic kidney disease) stage 2, GFR 60-89 ml/min, Coronary artery disease, Diabetic peripheral neuropathy ([address]), Diastolic HF (heart failure) ([address]), Hyperlipidemia with target LDL less than 70, Hypertension goal BP (blood pressure) < 130/80, PVD (peripheral vascular disease) ([address]), Seizures ([address]), and Type 1 diabetes mellitus with chronic kidney disease ([address]). Surgical History:   has a past surgical history that includes Cardiac surgery (2001); Coronary artery bypass graft; Cardiac catheterization; hernia repair; Tonsillectomy; LAPAROSCOPY INSERTION PERITONEAL CATHETER (N/A, 06/03/2020); Carotid stent placement (Right); Bladder surgery (Left, 08/24/2023); and Foot Debridement (Left, 10/16/2023). Social History:   reports that he has never smoked.  He has never used smokeless tobacco. He Oral Q8H PRN Erin Butterfield MD        Or    ondansetron [facility]) injection 4 mg  4 mg IntraVENous Q6H PRN Erin Butterfield MD   4 mg at 10/18/23 1945    polyethylene glycol (GLYCOLAX) packet 17 g  17 g Oral Daily PRN Erin Butterfield MD        acetaminophen (TYLENOL) tablet 650 mg  650 mg Oral Q6H PRN Erin Butterfield MD   650 mg at 10/18/23 1846    Or    acetaminophen (TYLENOL) suppository 650 mg  650 mg Rectal Q6H PRN Erin Butterfield MD        oxyCODONE (ROXICODONE) immediate release tablet 5 mg  5 mg Oral Q4H PRN Dimas Juarez, DPM        Or    oxyCODONE (ROXICODONE) immediate release tablet 10 mg  10 mg Oral Q4H PRN Dimas Juarez, DPM   10 mg at 10/17/23 1851    vancomycin (VANCOCIN) intermittent dosing (placeholder)   Other RX Placeholder Toan Cotton MD        lactobacillus (CULTURELLE) capsule 1 capsule  1 capsule Oral BID  Indra Kinney MD   1 capsule at 10/18/23 1842    dianeal lo-miller 1.5% 2,500 mL  2,500 mL IntraPERitoneal Continuous Sierra Dhaliwal DO            Allergies: Iodides, Shellfish-derived products, and Atorvastatin calcium     Review of Systems:     Constitutional: there has been no unanticipated weight loss. There's been no change in energy level, sleep pattern, or activity level. Eyes: No visual changes or diplopia. No scleral icterus. ENT: No Headaches, hearing loss or vertigo. No mouth sores or sore throat. Cardiovascular: Reviewed in HPI  Respiratory: No cough or wheezing, no sputum production. No hematemesis. Gastrointestinal: No abdominal pain, appetite loss, blood in stools. No change in bowel or bladder habits. Genitourinary: No dysuria, trouble voiding, or hematuria. Musculoskeletal:  No gait disturbance, weakness or joint complaints. Integumentary: No rash or pruritis. Neurological: No headache, diplopia, change in muscle strength, numbness or tingling. No change in gait, balance, coordination, mood, affect, memory, mentation, behavior.   Psychiatric: No anxiety, no

## 2023-10-19 NOTE — PROGRESS NOTES
Infectious Diseases   Progress Note      Admission Date: 10/16/2023  Hospital Day: Hospital Day: 4   Attending: Yue Cottrell MD  Date of service: 10/19/2023     Chief complaint/ Reason for consult:       Complicated left diabetic foot infection with concern for gas gangrene  S/p left foot surgical I&D with bone biopsy on 10/16/2023  Longstanding type 2 diabetes mellitus  Diabetic polyneuropathy type II  End-stage renal disease on hemodialysis    Microbiology:        I have reviewed allavailable micro lab data and cultures    Blood culture (2/2) - collected on 10/16/2023: Negative so far  Left foot surgical culture  - collected on 10/16/2023: Staphylococcus urealyticum      Antibiotics and immunizations:       Current antibiotics: All antibiotics and their doses were reviewed by me    Recent Abx Admin                     meropenem (MERREM) 500 mg in sodium chloride 0.9 % 100 mL IVPB (mini-bag) (mg) 500 mg New Bag 10/18/23 1842                      Immunization History: All immunization history was reviewed by me today. Immunization History   Administered Date(s) Administered    DT (pediatric) 01/01/1999    Influenza Vaccine, unspecified formulation 11/27/2012    Influenza Virus Vaccine 09/11/2009, 11/27/2012    Influenza, FLUARIX, FLULAVAL, FLUZONE (age 10 mo+) AND AFLURIA, (age 1 y+), PF, 0.5mL 10/26/2016, 02/06/2018, 03/05/2021    Pneumococcal, PPSV23, PNEUMOVAX 23, (age 2y+), SC/IM, 0.5mL 04/20/2011, 02/21/2014    TDaP, ADACEL (age 6y-58y), BOOSTRIX (age 10y+), IM, 0.5mL 02/21/2014    Td, unspecified formulation 04/20/2011       Known drug allergies: All allergies were reviewed and updated    Allergies   Allergen Reactions    Iodides Anaphylaxis    Shellfish-Derived Products Anaphylaxis    Atorvastatin Calcium Rash       Social history:     Social History:  All social andepidemiologic history was reviewed and updated by me today as needed. Tobacco use:   reports that he has never smoked.  He has inadvertently. If you may need any clarification, please do not hesitate to contact me through EPIC or at the phone number provided below with my electronic signature. Any pictures or media included in this note were obtained after taking informed verbal consent from the patient and with their approval to include those in the patient's medical record. Abimbola Mrarufo MD, MPH, LETTY Rojas  10/19/2023, 5:46 PM  [facility] Infectious Disease   [facility] [address] ([address] [place] [address].)  [facility], [address] [place] [address]  Office: [phone]  Fax: [phone]  In-person Clinic days:  Tuesday & Thursday a.m. Virtual clinic days: Monday, Wednesday & Friday a.m.

## 2023-10-19 NOTE — PLAN OF CARE
Problem: Chronic Conditions and Co-morbidities  Goal: Patient's chronic conditions and co-morbidity symptoms are monitored and maintained or improved  Outcome: Progressing  Flowsheets (Taken 10/18/2023 2045)  Care Plan - Patient's Chronic Conditions and Co-Morbidity Symptoms are Monitored and Maintained or Improved: Monitor and assess patient's chronic conditions and comorbid symptoms for stability, deterioration, or improvement     Problem: Pain  Goal: Verbalizes/displays adequate comfort level or baseline comfort level  Outcome: Progressing     Problem: Safety - Adult  Goal: Free from fall injury  Outcome: Progressing     Problem: Discharge Planning  Goal: Discharge to home or other facility with appropriate resources  Outcome: Progressing  Flowsheets (Taken 10/18/2023 2045)  Discharge to home or other facility with appropriate resources: Identify barriers to discharge with patient and caregiver     Problem: ABCDS Injury Assessment  Goal: Absence of physical injury  Outcome: Progressing     Problem: Skin/Tissue Integrity  Goal: Absence of new skin breakdown  Description: 1. Monitor for areas of redness and/or skin breakdown  2. Assess vascular access sites hourly  3. Every 4-6 hours minimum:  Change oxygen saturation probe site  4. Every 4-6 hours:  If on nasal continuous positive airway pressure, respiratory therapy assess nares and determine need for appliance change or resting period.   Outcome: Progressing

## 2023-10-19 NOTE — CONSULTS
bypass graft)    Ischemic cardiomyopathy    Venous insufficiency of both lower extremities    Atrial fibrillation with rapid ventricular response ([facility])    Syncope    Stage 4 chronic kidney disease ([facility])    NSVT (nonsustained ventricular tachycardia) ([facility])    Acute on chronic combined systolic and diastolic CHF (congestive heart failure) ([facility])    Left leg weakness    Arterial ischemic stroke, ICA, right, acute ([address])    DM (diabetes mellitus), type 2, uncontrolled with complications    End-stage renal disease on peritoneal dialysis ([facility])    Hypersomnia    Moderate obstructive sleep apnea    Snoring    Unstable angina ([facility])    ESRD (end stage renal disease) on dialysis ([address])    Fever due to COVID-19    Peritonitis associated with peritoneal dialysis ([address])    Sepsis ([address])    Dialysis-associated peritonitis ([facility])    Nausea and vomiting    Elevated procalcitonin    Peritoneal dialysis catheter in place [facility])    Abdominal pain    Poorly controlled type 1 diabetes mellitus ([address])    Overweight (BMI 25.0-29. 9)    Carotid stenosis, right    Hyperlipidemia due to type 1 diabetes mellitus ([facility])    HFrEF (heart failure with reduced ejection fraction) ([facility])    History of carotid stenosis    Acute metabolic encephalopathy    History of stroke    TIA (transient ischemic attack)    Diabetic ulcer of left foot ([address])    Diabetes education, encounter for    Pseudomonas aeruginosa infection    Kidney stone    Left flank pain    Flank pain    Left foot infection    Diabetic ulcer of midfoot associated with diabetes mellitus due to underlying condition, limited to breakdown of skin ([facility])    PAF (paroxysmal atrial fibrillation) ([facility])    Class 1 obesity due to excess calories with body mass index (BMI) of 30.0 to 30.9 in adult       Plan: Patient developed severe chest pain last night, and closure of his left foot wound is now put on hold so a cardiac evaluation can be done on him today.   Patient is scheduled for a cardiac evaluation with

## 2023-10-19 NOTE — PROGRESS NOTES
Transfer from [facility], s for hand placement. Ambulation:  Assistive Device: rolling walker  Other Appliance: L surgical shoe  Assistance: contact guard assistance  Distance: 15 ft  Gait Mechanics: Pt with hop-to gait pattern and minimal L heel WB, heavy lean on RW. Slow edwina. Comment: Pt amb slowly, carefully in room and returned to sit in a recliner chair. SOB noted during activity. Stair Mobility:  Stair mobility not completed on this date. Comments:  Wheelchair Mobility:  No w/c mobility completed on this date. Comments:  Balance:  Static Sitting Balance: fair (+): maintains balance at SBA/supervision without use of UE support  Dynamic Sitting Balance: fair (+): maintains balance at SBA/supervision without use of UE support  Static Standing Balance: fair (-): maintains balance at CGA with use of UE support  Dynamic Standing Balance: fair (-): maintains balance at CGA with use of UE support  Comments: RW for bilat UE support    Other Therapeutic Interventions  Orthostatics taking and as follows:  BP supine - 146/89  BP seated - 132/77  BP standing - 121/74  Pt with no dizziness symptoms during treatment. Pt assisted with donning L surgical shoe. Once seated in chair, performed bathing with  wipes and gown changed, assist to manage lines. Pt performed seated there ex including marching and LAQs 2x10 each, AP x 10. Pt instructed for GS and QS in supine. Pt positioned in chair for elevation of bilat LEs, L foot up on pillow and all needs placed in reach.      Functional Outcomes  AM-PAC Inpatient Mobility Raw Score : 17              Cognition  WFL  Orientation:    alert and oriented x 4  Command Following:   [facility]    Education  Barriers To Learning: none  Patient Education: patient educated on goals, PT role and benefits, plan of care, precautions, weight-bearing education, general safety, functional mobility training, proper use of assistive device/equipment, transfer training, discharge guard assistance  **No goals met in full this date, 10/19. Above goals reviewed on 10/19/2023. All goals are ongoing at this time unless indicated above.       Therapy Session Time      Individual Group Co-treatment   Time In 3762       Time Out 1358       Minutes 42         Timed Code Treatment Minutes:  42 Minutes  Total Treatment Minutes:  42 minutes       Electronically Signed By: Alexandra Beavers [id number]

## 2023-10-20 ENCOUNTER — ANESTHESIA EVENT (OUTPATIENT)
Dept: OPERATING ROOM | Age: 58
DRG: 622 | End: 2023-10-20
Payer: MEDICARE

## 2023-10-20 LAB
BACTERIA BLD CULT ORG #2: NORMAL
BACTERIA BLD CULT: NORMAL
GLUCOSE BLD-MCNC: 142 MG/DL (ref 70–99)
GLUCOSE BLD-MCNC: 222 MG/DL (ref 70–99)
GLUCOSE BLD-MCNC: 271 MG/DL (ref 70–99)
GLUCOSE BLD-MCNC: 301 MG/DL (ref 70–99)
PERFORMED ON: ABNORMAL
VANCOMYCIN SERPL-MCNC: 20.8 UG/ML

## 2023-10-20 PROCEDURE — 2580000003 HC RX 258: Performed by: INTERNAL MEDICINE

## 2023-10-20 PROCEDURE — 6360000002 HC RX W HCPCS: Performed by: PHYSICIAN ASSISTANT

## 2023-10-20 PROCEDURE — 6370000000 HC RX 637 (ALT 250 FOR IP): Performed by: INTERNAL MEDICINE

## 2023-10-20 PROCEDURE — 6370000000 HC RX 637 (ALT 250 FOR IP): Performed by: HOSPITALIST

## 2023-10-20 PROCEDURE — 97110 THERAPEUTIC EXERCISES: CPT

## 2023-10-20 PROCEDURE — 36415 COLL VENOUS BLD VENIPUNCTURE: CPT

## 2023-10-20 PROCEDURE — 6370000000 HC RX 637 (ALT 250 FOR IP): Performed by: NURSE PRACTITIONER

## 2023-10-20 PROCEDURE — 90945 DIALYSIS ONE EVALUATION: CPT

## 2023-10-20 PROCEDURE — 97530 THERAPEUTIC ACTIVITIES: CPT

## 2023-10-20 PROCEDURE — 80202 ASSAY OF VANCOMYCIN: CPT

## 2023-10-20 PROCEDURE — 6370000000 HC RX 637 (ALT 250 FOR IP)

## 2023-10-20 PROCEDURE — 99233 SBSQ HOSP IP/OBS HIGH 50: CPT | Performed by: INTERNAL MEDICINE

## 2023-10-20 PROCEDURE — 6360000002 HC RX W HCPCS

## 2023-10-20 PROCEDURE — 99232 SBSQ HOSP IP/OBS MODERATE 35: CPT | Performed by: INTERNAL MEDICINE

## 2023-10-20 PROCEDURE — 2580000003 HC RX 258: Performed by: HOSPITALIST

## 2023-10-20 PROCEDURE — 1200000000 HC SEMI PRIVATE

## 2023-10-20 RX ORDER — SODIUM CHLORIDE, SODIUM LACTATE, CALCIUM CHLORIDE, MAGNESIUM CHLORIDE AND DEXTROSE 2.5; 538; 448; 18.3; 5.08 G/100ML; MG/100ML; MG/100ML; MG/100ML; MG/100ML
2500 INJECTION, SOLUTION INTRAPERITONEAL CONTINUOUS
Status: DISCONTINUED | OUTPATIENT
Start: 2023-10-20 | End: 2023-10-24 | Stop reason: HOSPADM

## 2023-10-20 RX ORDER — SODIUM CHLORIDE, SODIUM LACTATE, CALCIUM CHLORIDE, MAGNESIUM CHLORIDE AND DEXTROSE 1.5; 538; 448; 18.3; 5.08 G/100ML; MG/100ML; MG/100ML; MG/100ML; MG/100ML
2500 INJECTION, SOLUTION INTRAPERITONEAL CONTINUOUS
Status: DISCONTINUED | OUTPATIENT
Start: 2023-10-20 | End: 2023-10-24

## 2023-10-20 RX ORDER — DOXYCYCLINE HYCLATE 100 MG
100 TABLET ORAL EVERY 12 HOURS SCHEDULED
Status: DISCONTINUED | OUTPATIENT
Start: 2023-10-20 | End: 2023-10-24 | Stop reason: HOSPADM

## 2023-10-20 RX ORDER — CALCIUM CARBONATE 500 MG/1
500 TABLET, CHEWABLE ORAL 3 TIMES DAILY PRN
Status: DISCONTINUED | OUTPATIENT
Start: 2023-10-20 | End: 2023-10-24 | Stop reason: HOSPADM

## 2023-10-20 RX ADMIN — ASPIRIN 81 MG: 81 TABLET, COATED ORAL at 08:53

## 2023-10-20 RX ADMIN — ROSUVASTATIN 10 MG: 20 TABLET, FILM COATED ORAL at 21:11

## 2023-10-20 RX ADMIN — RANOLAZINE 500 MG: 500 TABLET, EXTENDED RELEASE ORAL at 00:13

## 2023-10-20 RX ADMIN — SACUBITRIL AND VALSARTAN 1 TABLET: 49; 51 TABLET, FILM COATED ORAL at 00:13

## 2023-10-20 RX ADMIN — HEPARIN SODIUM 5000 UNITS: 5000 INJECTION INTRAVENOUS; SUBCUTANEOUS at 00:13

## 2023-10-20 RX ADMIN — CARVEDILOL 25 MG: 25 TABLET, FILM COATED ORAL at 08:53

## 2023-10-20 RX ADMIN — DOXYCYCLINE HYCLATE 100 MG: 100 TABLET, COATED ORAL at 15:04

## 2023-10-20 RX ADMIN — Medication 1 CAPSULE: at 08:54

## 2023-10-20 RX ADMIN — Medication 1 CAPSULE: at 15:04

## 2023-10-20 RX ADMIN — Medication 10 ML: at 08:54

## 2023-10-20 RX ADMIN — SACUBITRIL AND VALSARTAN 1 TABLET: 49; 51 TABLET, FILM COATED ORAL at 21:11

## 2023-10-20 RX ADMIN — FUROSEMIDE 80 MG: 40 TABLET ORAL at 08:53

## 2023-10-20 RX ADMIN — RANOLAZINE 500 MG: 500 TABLET, EXTENDED RELEASE ORAL at 08:53

## 2023-10-20 RX ADMIN — OXYCODONE HYDROCHLORIDE 5 MG: 5 TABLET ORAL at 00:13

## 2023-10-20 RX ADMIN — FUROSEMIDE 80 MG: 40 TABLET ORAL at 18:11

## 2023-10-20 RX ADMIN — ISOSORBIDE MONONITRATE 30 MG: 30 TABLET, EXTENDED RELEASE ORAL at 08:53

## 2023-10-20 RX ADMIN — SACUBITRIL AND VALSARTAN 1 TABLET: 49; 51 TABLET, FILM COATED ORAL at 08:53

## 2023-10-20 RX ADMIN — Medication 10 ML: at 08:53

## 2023-10-20 RX ADMIN — HEPARIN SODIUM 5000 UNITS: 5000 INJECTION INTRAVENOUS; SUBCUTANEOUS at 21:11

## 2023-10-20 RX ADMIN — HEPARIN SODIUM 5000 UNITS: 5000 INJECTION INTRAVENOUS; SUBCUTANEOUS at 15:04

## 2023-10-20 RX ADMIN — ANTACID TABLETS 500 MG: 500 TABLET, CHEWABLE ORAL at 01:28

## 2023-10-20 RX ADMIN — CALCITRIOL CAPSULES 0.25 MCG 0.25 MCG: 0.25 CAPSULE ORAL at 08:53

## 2023-10-20 RX ADMIN — Medication 10 ML: at 21:21

## 2023-10-20 RX ADMIN — DOXYCYCLINE HYCLATE 100 MG: 100 TABLET, COATED ORAL at 21:11

## 2023-10-20 RX ADMIN — CLOPIDOGREL 75 MG: 75 TABLET, FILM COATED ORAL at 09:00

## 2023-10-20 RX ADMIN — CARVEDILOL 25 MG: 25 TABLET, FILM COATED ORAL at 15:04

## 2023-10-20 RX ADMIN — HEPARIN SODIUM 5000 UNITS: 5000 INJECTION INTRAVENOUS; SUBCUTANEOUS at 06:05

## 2023-10-20 RX ADMIN — FUROSEMIDE 80 MG: 40 TABLET ORAL at 00:13

## 2023-10-20 RX ADMIN — RANOLAZINE 500 MG: 500 TABLET, EXTENDED RELEASE ORAL at 21:11

## 2023-10-20 RX ADMIN — CALCITRIOL CAPSULES 0.25 MCG 0.25 MCG: 0.25 CAPSULE ORAL at 21:11

## 2023-10-20 ASSESSMENT — ENCOUNTER SYMPTOMS
COUGH: 0
ABDOMINAL PAIN: 0
EYE DISCHARGE: 0
SINUS PAIN: 0
CONSTIPATION: 0
SHORTNESS OF BREATH: 0
EYE REDNESS: 0
SORE THROAT: 0
SINUS PRESSURE: 0
RHINORRHEA: 0
DIARRHEA: 0
NAUSEA: 0
SHORTNESS OF BREATH: 0
BACK PAIN: 0
WHEEZING: 0

## 2023-10-20 ASSESSMENT — PAIN SCALES - WONG BAKER
WONGBAKER_NUMERICALRESPONSE: 0

## 2023-10-20 ASSESSMENT — PAIN DESCRIPTION - LOCATION: LOCATION: FOOT

## 2023-10-20 ASSESSMENT — PAIN SCALES - GENERAL
PAINLEVEL_OUTOF10: 0
PAINLEVEL_OUTOF10: 0
PAINLEVEL_OUTOF10: 3
PAINLEVEL_OUTOF10: 0

## 2023-10-20 ASSESSMENT — PAIN DESCRIPTION - ORIENTATION: ORIENTATION: LEFT

## 2023-10-20 NOTE — PROGRESS NOTES
List   Diagnosis Code    DKA (diabetic ketoacidoses) E11.10    Coronary artery disease due to lipid rich plaque I25.10, I25.83    DM type 1 (diabetes mellitus, type 1) ([facility]) E10.9    Hyperlipidemia E78.5    Acute kidney injury superimposed on chronic kidney disease ([facility]) N17.9, N18.9    PNA (pneumonia) J18.9    CRI (chronic renal insufficiency) ([facility]) N18.9    CHF (congestive heart failure) ([facility]) I50.9    Hypoglycemia S76.6    Diastolic HF (heart failure) ([facility]) I50.30    Dyslipidemia E78.5    CKD (chronic kidney disease) stage 2, GFR 60-89 ml/min N18.2    Hypertension due to end stage renal disease caused by type 1 diabetes mellitus, on dialysis ([facility]) E10.22, I12.0, Z99.2, N18.6    Diabetic polyneuropathy associated with type 2 diabetes mellitus ([facility]) E11.42    Carotid artery stenosis I65.29    PVD (peripheral vascular disease) ([facility]) I73.9    Diabetic nephropathy associated with type 1 diabetes mellitus ([facility]) E10.21    Chest pain R07.9    Stenosis of right carotid artery I65.21    Carotid artery calcification I65.29    H/O carotid angioplasty Z98.62    Pure hypercholesterolemia E78.00    Abnormal cardiovascular stress test R94.39    Acute encephalopathy G93.40    Generalized idiopathic epilepsy, not intractable, without status epilepticus ([address]) G40.309    Seizure disorder ([address]) G40.909    NSTEMI (non-ST elevated myocardial infarction) ([address]) I21.4    Essential hypertension I10    S/P CABG (coronary artery bypass graft) Z95.1    Ischemic cardiomyopathy I25.5    Venous insufficiency of both lower extremities I87.2    Atrial fibrillation with rapid ventricular response ([facility]) I48.91    Syncope R55    Stage 4 chronic kidney disease ([facility]) N18.4    NSVT (nonsustained ventricular tachycardia) ([facility]) I47.29    Acute on chronic combined systolic and diastolic CHF (congestive heart failure) ([facility]) I50.43    Left leg weakness R29.898    Arterial ischemic stroke, ICA, right, acute ([address]) I63.231    DM (diabetes mellitus), type 2, uncontrolled with complications [id number]    End-stage renal disease on peritoneal dialysis ([facility]) N18.6, Z99.2    Hypersomnia G47.10    Moderate obstructive sleep apnea G47.33    Snoring R06.83    Unstable angina ([facility]) I20.0    ESRD (end stage renal disease) on dialysis ([facility]) N18.6, Z99.2    Fever due to COVID-19 U07.1, R50.81    Peritonitis associated with peritoneal dialysis ([address]) T85.71XA    Sepsis ([address]) A41.9    Dialysis-associated peritonitis ([facility]) T85.71XA    Nausea and vomiting R11.2    Elevated procalcitonin R79.89    Peritoneal dialysis catheter in place [facility]) Z99.2    Abdominal pain R10.9    Poorly controlled type 1 diabetes mellitus ([address]) E10.65    Overweight (BMI 25.0-29. 9) E66.3    Carotid stenosis, right I65.21    Hyperlipidemia due to type 1 diabetes mellitus ([facility]) E10.69, E78.5    HFrEF (heart failure with reduced ejection fraction) ([facility]) I50.20    History of carotid stenosis Q91.68    Acute metabolic encephalopathy I69.36    History of stroke Z86.73    TIA (transient ischemic attack) G45.9    Diabetic ulcer of left foot ([facility]) E11.621, L97.529    Diabetes education, encounter for Z71.89    Pseudomonas aeruginosa infection A49.8    Kidney stone N20.0    Left flank pain R10.9    Flank pain R10.9    Left foot infection L08.9    Diabetic ulcer of midfoot associated with diabetes mellitus due to underlying condition, limited to breakdown of skin ([address]) E82.142, L97.401    PAF (paroxysmal atrial fibrillation) ([facility]) I48.0    Class 1 obesity due to excess calories with body mass index (BMI) of 30.0 to 30.9 in adult E66.09, Z68.30       Please note that this chart was generated using Dragon dictation software. Although every effort was made to ensure the accuracy of this automated transcription, some errors in transcription may have occurred inadvertently. If you may need any clarification, please do not hesitate to contact me through EPIC or at the phone number provided below with my electronic signature.   Any

## 2023-10-20 NOTE — PLAN OF CARE
Problem: Chronic Conditions and Co-morbidities  Goal: Patient's chronic conditions and co-morbidity symptoms are monitored and maintained or improved  Outcome: Progressing     Problem: Pain  Goal: Verbalizes/displays adequate comfort level or baseline comfort level  Outcome: Progressing     Problem: Safety - Adult  Goal: Free from fall injury  Outcome: Progressing     Problem: Discharge Planning  Goal: Discharge to home or other facility with appropriate resources  Outcome: Progressing     Problem: ABCDS Injury Assessment  Goal: Absence of physical injury  Outcome: Progressing     Problem: Skin/Tissue Integrity  Goal: Absence of new skin breakdown  Description: 1. Monitor for areas of redness and/or skin breakdown  2. Assess vascular access sites hourly  3. Every 4-6 hours minimum:  Change oxygen saturation probe site  4. Every 4-6 hours:  If on nasal continuous positive airway pressure, respiratory therapy assess nares and determine need for appliance change or resting period.   Outcome: Progressing

## 2023-10-20 NOTE — PROGRESS NOTES
Disconnected from CCPD per protocol. Effluent: clear, light yellow. No fibrin   Total time: 9 hr 45 min   Total UF:  -269 ml. (Net positive) BS high last night, insulin pump    Total Volume:  9996 ml. Dwell time gained:  0 hr 45 min.   Pt Tolerated procedure: no alarms     Last BM: 10/16/23

## 2023-10-20 NOTE — PROGRESS NOTES
patient left in bed, bed alarm in place, call light within reach, gait belt, patient at risk for falls, and nurse notified    Plan  Frequency: 3-5 x/per week  Current Treatment Recommendations: strengthening, ROM, balance training, functional mobility training, transfer training, gait training, stair training, endurance training, patient/caregiver education, home management training, home exercise program, safety education, equipment evaluation/education, and positioning    Goals  Patient Goals: go home   Short Term Goals:  Time Frame: upon discharge (while maintaining heel WBing on (L) LE)  Patient will complete bed mobility at Independent   Patient will complete transfers at stand by assistance   Patient will ambulate 50 ft with use of rolling walker at contact guard assistance  Patient will ascend/descend 7 stairs with (L) ascending handrail at contact guard assistance  **No goals met in full this date, 10/19. Above goals reviewed on 10/20/2023. All goals are ongoing at this time unless indicated above.       Therapy Session Time      Individual Group Co-treatment   Time In 1351       Time Out 1423       Minutes 32         Timed Code Treatment Minutes:  32 Minutes  Total Treatment Minutes:  32 minutes       Electronically Signed By: Valorie Edge [place] [id number]

## 2023-10-20 NOTE — PROGRESS NOTES
NP is notified about pt plan for am for possible cath lab and pt has heparin injection subcutaneous tonight and in am, per NP, it is ok to give heparin injection subcutaneous tonight and in am.

## 2023-10-20 NOTE — PROGRESS NOTES
[facility]  Diabetes Education   Progress Note       NAME:  Keri Carr RECORD NUMBER:  [id number]  AGE: 66 y.o. GENDER: male  : 1965  TODAY'S DATE:  10/20/2023    Subjective   Reason for Diabetes Education Evaluation and Assessment: Insulin Pump/Continuous Glucose Monitor   Insulin Pump patient, per Dr Elenore Junior brittle T1DM since age 17 y/o.  Hemoglobin A1C 9%    Visit Type: follow-up      Simon Noble is a 66 y.o. male referred by:  [x] Physician    PAST MEDICAL HISTORY        Diagnosis Date    Angina at rest     Cardiomyopathy [facility])     Carotid artery stenosis 10/25/2016    SUZANNA stented with 8 x 30 mm non-tapered Xact stent    CHF (congestive heart failure) ([address]) 2015    EF 30%     CKD (chronic kidney disease) stage 2, GFR 60-89 ml/min     Coronary artery disease     sp CABG [facility]    Diabetic peripheral neuropathy (HCC)     Diastolic HF (heart failure) ([facility])     Hyperlipidemia with target LDL less than 70     Hypertension goal BP (blood pressure) < 130/80     PVD (peripheral vascular disease) ([facility])     Seizures ([address])     in childhood    Type 1 diabetes mellitus with chronic kidney disease ([facility])     c-peptide <0.1 in        PAST SURGICAL HISTORY    Past Surgical History:   Procedure Laterality Date    BLADDER SURGERY Left 2023    CYSTOSCOPY, LEFT URETEROSCOPY, STONE BASKET MANIPULATION, PLACEMENT OF LEFT URETERAL STENT performed by Danita Hamman, MD at [address] [facility]    triple bypass at [address] [place] Right     CORONARY ARTERY BYPASS GRAFT      FOOT DEBRIDEMENT Left 10/16/2023    LEFT FOOT DEBRIDEMENT INCISION AND DRAINAGE WITH BONE BIOPSY performed by Danette Mitchell DPM at [address] [place] [address]    LAPAROSCOPY INSERTION PERITONEAL CATHETER N/A 2020    LAPAROSCOPIC PLACEMENT OF PERITONEAL DIALYSIS  CATHETER performed by Theodore Mehta MD at [facility]

## 2023-10-20 NOTE — PROGRESS NOTES
CCPD Order   Exchanges: 4   Exchange Volume: 2500 ml   Total Time: 10 hrs   Dextrose: 2.5% and 1.5%   Last Fill: 0 ml   Total Volume: 62646 ml     Orders verified. Supplies taken to pt's room. Report received. Cycler set up, primed and pre tested. [facility]. Pt connected to cycler. CCPD initiated without problem. Initial effluent clear. If problems should arise please call the [phone] number on top of PD cycler machine.

## 2023-10-20 NOTE — ANESTHESIA PRE PROCEDURE
Status (If Applicable):  No results found for: \"HIV\", \"HEPCAB\"    COVID-19 Screening (If Applicable):   Lab Results   Component Value Date/Time    COVID19 Detected 12/07/2021 02:10 PM    COVID19 NOT DETECTED 08/19/2020 12:15 PM           Anesthesia Evaluation  Patient summary reviewed and Nursing notes reviewed no history of anesthetic complications:   Airway: Mallampati: I  TM distance: >3 FB   Neck ROM: full  Mouth opening: > = 3 FB   Dental: normal exam   (+) partials      Pulmonary:   (+) pneumonia:  sleep apnea:      (-) asthma and shortness of breath                           Cardiovascular:    (+) hypertension:, angina: no interval change, past MI:, CAD:, CABG/stent: no interval change, CHF: diastolic, hyperlipidemia               ROS comment: 6/23    Summary   Definity was used to assist with endocardial border delineation. Moderately dilated left ventricular cavity size. Normal left ventricular   wall thickness. Moderately diminished left ventricular systolic function   with an estimated ejection fraction of 35%. Global hypokinesis with regional   variations. No evidence of apical thrombus with Definity. Grade I diastolic dysfunction with normal LV filling pressures. The right ventricle is normal in size and systolic function. Mild mitral regurgitation. Neuro/Psych:   (+) CVA:, TIA,             GI/Hepatic/Renal:   (+) renal disease: CRI and ARF,      (-) GERD and liver disease       Endo/Other:    (+) DiabetesType I DM, , .    (-) hypothyroidism               Abdominal:             Vascular:   + PVD, aortic or cerebral, . Other Findings:             Anesthesia Plan      general and MAC     ASA 4       Induction: intravenous. MIPS: Postoperative opioids intended and Prophylactic antiemetics administered. Plan discussed with attending and CRNA.     Attending anesthesiologist reviewed and agrees with Preprocedure content                Ezra SHABAZZ, APRN - CRNA   10/20/2023

## 2023-10-21 LAB
BACTERIA SPEC AEROBE CULT: ABNORMAL
BACTERIA SPEC ANAEROBE CULT: ABNORMAL
GLUCOSE BLD-MCNC: 103 MG/DL (ref 70–99)
GLUCOSE BLD-MCNC: 132 MG/DL (ref 70–99)
GLUCOSE BLD-MCNC: 142 MG/DL (ref 70–99)
GLUCOSE BLD-MCNC: 162 MG/DL (ref 70–99)
GLUCOSE BLD-MCNC: 231 MG/DL (ref 70–99)
GLUCOSE BLD-MCNC: 53 MG/DL (ref 70–99)
GLUCOSE BLD-MCNC: 55 MG/DL (ref 70–99)
GLUCOSE BLD-MCNC: 61 MG/DL (ref 70–99)
GLUCOSE BLD-MCNC: 73 MG/DL (ref 70–99)
ORGANISM: ABNORMAL
ORGANISM: ABNORMAL
PERFORMED ON: ABNORMAL
PERFORMED ON: NORMAL

## 2023-10-21 PROCEDURE — 6360000002 HC RX W HCPCS

## 2023-10-21 PROCEDURE — 6370000000 HC RX 637 (ALT 250 FOR IP): Performed by: PHYSICIAN ASSISTANT

## 2023-10-21 PROCEDURE — 2580000003 HC RX 258: Performed by: HOSPITALIST

## 2023-10-21 PROCEDURE — 6370000000 HC RX 637 (ALT 250 FOR IP): Performed by: HOSPITALIST

## 2023-10-21 PROCEDURE — 2580000003 HC RX 258: Performed by: INTERNAL MEDICINE

## 2023-10-21 PROCEDURE — 6370000000 HC RX 637 (ALT 250 FOR IP): Performed by: INTERNAL MEDICINE

## 2023-10-21 PROCEDURE — 1200000000 HC SEMI PRIVATE

## 2023-10-21 PROCEDURE — 6370000000 HC RX 637 (ALT 250 FOR IP)

## 2023-10-21 PROCEDURE — 6360000002 HC RX W HCPCS: Performed by: INTERNAL MEDICINE

## 2023-10-21 PROCEDURE — 90945 DIALYSIS ONE EVALUATION: CPT

## 2023-10-21 RX ORDER — CARVEDILOL 6.25 MG/1
12.5 TABLET ORAL 2 TIMES DAILY WITH MEALS
Status: DISCONTINUED | OUTPATIENT
Start: 2023-10-21 | End: 2023-10-24 | Stop reason: HOSPADM

## 2023-10-21 RX ORDER — FUROSEMIDE 40 MG/1
40 TABLET ORAL 2 TIMES DAILY
Status: DISCONTINUED | OUTPATIENT
Start: 2023-10-21 | End: 2023-10-24 | Stop reason: HOSPADM

## 2023-10-21 RX ADMIN — HEPARIN SODIUM 5000 UNITS: 5000 INJECTION INTRAVENOUS; SUBCUTANEOUS at 15:28

## 2023-10-21 RX ADMIN — Medication 10 ML: at 22:11

## 2023-10-21 RX ADMIN — ASPIRIN 81 MG: 81 TABLET, COATED ORAL at 09:56

## 2023-10-21 RX ADMIN — ISOSORBIDE MONONITRATE 30 MG: 30 TABLET, EXTENDED RELEASE ORAL at 09:56

## 2023-10-21 RX ADMIN — CALCITRIOL CAPSULES 0.25 MCG 0.25 MCG: 0.25 CAPSULE ORAL at 09:56

## 2023-10-21 RX ADMIN — FUROSEMIDE 40 MG: 40 TABLET ORAL at 22:09

## 2023-10-21 RX ADMIN — SACUBITRIL AND VALSARTAN 1 TABLET: 49; 51 TABLET, FILM COATED ORAL at 09:58

## 2023-10-21 RX ADMIN — CALCITRIOL CAPSULES 0.25 MCG 0.25 MCG: 0.25 CAPSULE ORAL at 22:09

## 2023-10-21 RX ADMIN — CLOPIDOGREL 75 MG: 75 TABLET, FILM COATED ORAL at 09:58

## 2023-10-21 RX ADMIN — SACUBITRIL AND VALSARTAN 1 TABLET: 49; 51 TABLET, FILM COATED ORAL at 22:09

## 2023-10-21 RX ADMIN — DOXYCYCLINE HYCLATE 100 MG: 100 TABLET, COATED ORAL at 22:10

## 2023-10-21 RX ADMIN — CARVEDILOL 12.5 MG: 6.25 TABLET, FILM COATED ORAL at 15:27

## 2023-10-21 RX ADMIN — HEPARIN SODIUM 5000 UNITS: 5000 INJECTION INTRAVENOUS; SUBCUTANEOUS at 22:10

## 2023-10-21 RX ADMIN — RANOLAZINE 500 MG: 500 TABLET, EXTENDED RELEASE ORAL at 09:56

## 2023-10-21 RX ADMIN — VANCOMYCIN HYDROCHLORIDE 750 MG: 750 INJECTION, POWDER, LYOPHILIZED, FOR SOLUTION INTRAVENOUS at 10:01

## 2023-10-21 RX ADMIN — ROSUVASTATIN 10 MG: 20 TABLET, FILM COATED ORAL at 22:09

## 2023-10-21 RX ADMIN — RANOLAZINE 500 MG: 500 TABLET, EXTENDED RELEASE ORAL at 22:09

## 2023-10-21 RX ADMIN — Medication 10 ML: at 10:02

## 2023-10-21 RX ADMIN — HEPARIN SODIUM 5000 UNITS: 5000 INJECTION INTRAVENOUS; SUBCUTANEOUS at 06:12

## 2023-10-21 RX ADMIN — Medication 10 ML: at 22:12

## 2023-10-21 RX ADMIN — Medication 1 CAPSULE: at 09:56

## 2023-10-21 RX ADMIN — DOXYCYCLINE HYCLATE 100 MG: 100 TABLET, COATED ORAL at 09:56

## 2023-10-21 RX ADMIN — Medication 1 CAPSULE: at 15:27

## 2023-10-21 ASSESSMENT — PAIN SCALES - GENERAL
PAINLEVEL_OUTOF10: 0

## 2023-10-21 NOTE — PROGRESS NOTES
Pre-Operative Note  Resident Note     Patient: Santos Ochoa      Procedure: Left foot I&D with [facility] and possible dermal graft application     Consent: In chart     Labs:        Recent Labs     10/19/23  0733 10/19/23  2327   WBC 7.9 9.6   HGB 10.9* 10.6*   HCT 33.5* 32.8*   MCV 89.9 90.8    147        Recent Labs     10/19/23  0733 10/19/23  2211   * 131*   K 3.7 3.9   CL 98* 96*   CO2 20* 20*   BUN 43* 45*   CREATININE 7.7* 8.3*      No results for input(s): \"AST\", \"ALT\", \"ALB\", \"BILIDIR\", \"BILITOT\", \"ALKPHOS\" in the last 72 hours. No results for input(s): \"LIPASE\", \"AMYLASE\" in the last 72 hours. Recent Labs     10/18/23  1458   INR 1.07      No results for input(s): \"CKTOTAL\", \"CKMB\", \"CKMBINDEX\", \"TROPONINI\" in the last 72 hours.     Saline lock/IV access: Yes    Clearance for surgery: Yes per medicine     Diet: NPO at midnight     CXR: normal      EKG:  See cardio section of EMR      Echocardiogram: not done    PT/INR: 13.5/1.03    Abx: Vancomycin and Doxycycline    Type and Screen: B Positive/Antibody Negative    Pregnancy test: Not warranted    Known risk factors for perioperative complications: Coronary disease  Congestive heart failure  Diabetes mellitus      Anesthesia to see patient      Clarisse Martin DPM  Podiatric Resident PGY-2  Pager [phone] or Perfect Serve

## 2023-10-21 NOTE — PLAN OF CARE
Problem: Chronic Conditions and Co-morbidities  Goal: Patient's chronic conditions and co-morbidity symptoms are monitored and maintained or improved  Recent Flowsheet Documentation  Taken 10/20/2023 2000 by Alvaro Dominguez [facility] - Patient's Chronic Conditions and Co-Morbidity Symptoms are Monitored and Maintained or Improved:   Monitor and assess patient's chronic conditions and comorbid symptoms for stability, deterioration, or improvement   Collaborate with multidisciplinary team to address chronic and comorbid conditions and prevent exacerbation or deterioration   Update acute care plan with appropriate goals if chronic or comorbid symptoms are exacerbated and prevent overall improvement and discharge     Problem: Pain  Goal: Verbalizes/displays adequate comfort level or baseline comfort level  Recent Flowsheet Documentation  Taken 10/20/2023 2100 by Ricky Mcintosh  Verbalizes/displays adequate comfort level or baseline comfort level:   Encourage patient to monitor pain and request assistance   Assess pain using appropriate pain scale   Administer analgesics based on type and severity of pain and evaluate response   Implement non-pharmacological measures as appropriate and evaluate response   Consider cultural and social influences on pain and pain management   Notify Licensed Independent Practitioner if interventions unsuccessful or patient reports new pain  10/20/2023 1633 by Kevin Mitchell RN  Outcome: Progressing     Problem: Safety - Adult  Goal: Free from fall injury  Recent Flowsheet Documentation  Taken 10/21/2023 0052 by Ricky Mcintosh  Free From Fall Injury:   Instruct family/caregiver on patient safety   Based on caregiver fall risk screen, instruct family/caregiver to ask for assistance with transferring infant if caregiver noted to have fall risk factors  10/20/2023 1633 by Kevin Mitchell RN  Outcome: Progressing     Problem: Discharge Planning  Goal: Discharge to home or other facility with

## 2023-10-21 NOTE — PROGRESS NOTES
PD treatment 10/20/23 to 10/21/23  UF: 1099 ML  Total Time: 9 hours and 10 minutes  Alarm: 0 times over night  CCPD removed fluid: color yellow clear ; without fibrin noticed  Pt tolerated good over night with CCPD  Plan to put pt on CCPD this evening if pt's still at hospital  Placed CCPD flow sheets in the chart for EMR scan

## 2023-10-21 NOTE — DIALYSIS
Treatment initiated without complications or complaints from patient. Patient resting comfortably with VSS upon dialysis RN exit from room. Lydia Paz RN notified of start of treatment and hotline number located on top of machine for any questions about troubleshooting during after hours.

## 2023-10-22 LAB
ABO + RH BLD: NORMAL
ANION GAP SERPL CALCULATED.3IONS-SCNC: 14 MMOL/L (ref 3–16)
BASOPHILS # BLD: 0 K/UL (ref 0–0.2)
BASOPHILS NFR BLD: 0.4 %
BLD GP AB SCN SERPL QL: NORMAL
BUN SERPL-MCNC: 46 MG/DL (ref 7–20)
CALCIUM SERPL-MCNC: 8.8 MG/DL (ref 8.3–10.6)
CHLORIDE SERPL-SCNC: 92 MMOL/L (ref 99–110)
CO2 SERPL-SCNC: 25 MMOL/L (ref 21–32)
CREAT SERPL-MCNC: 8.7 MG/DL (ref 0.9–1.3)
DEPRECATED RDW RBC AUTO: 14.7 % (ref 12.4–15.4)
EKG ATRIAL RATE: 78 BPM
EKG DIAGNOSIS: NORMAL
EKG P AXIS: 56 DEGREES
EKG P-R INTERVAL: 144 MS
EKG Q-T INTERVAL: 432 MS
EKG QRS DURATION: 100 MS
EKG QTC CALCULATION (BAZETT): 492 MS
EKG R AXIS: 23 DEGREES
EKG T AXIS: 80 DEGREES
EKG VENTRICULAR RATE: 78 BPM
EOSINOPHIL # BLD: 0.1 K/UL (ref 0–0.6)
EOSINOPHIL NFR BLD: 1.9 %
GFR SERPLBLD CREATININE-BSD FMLA CKD-EPI: 7 ML/MIN/{1.73_M2}
GLUCOSE BLD-MCNC: 150 MG/DL (ref 70–99)
GLUCOSE BLD-MCNC: 170 MG/DL (ref 70–99)
GLUCOSE BLD-MCNC: 178 MG/DL (ref 70–99)
GLUCOSE BLD-MCNC: 199 MG/DL (ref 70–99)
GLUCOSE BLD-MCNC: 203 MG/DL (ref 70–99)
GLUCOSE SERPL-MCNC: 163 MG/DL (ref 70–99)
HCT VFR BLD AUTO: 29.9 % (ref 40.5–52.5)
HGB BLD-MCNC: 9.8 G/DL (ref 13.5–17.5)
INR PPP: 1.03 (ref 0.84–1.16)
LYMPHOCYTES # BLD: 1.6 K/UL (ref 1–5.1)
LYMPHOCYTES NFR BLD: 20.6 %
MAGNESIUM SERPL-MCNC: 1.8 MG/DL (ref 1.8–2.4)
MCH RBC QN AUTO: 29.8 PG (ref 26–34)
MCHC RBC AUTO-ENTMCNC: 32.7 G/DL (ref 31–36)
MCV RBC AUTO: 91.1 FL (ref 80–100)
MONOCYTES # BLD: 0.8 K/UL (ref 0–1.3)
MONOCYTES NFR BLD: 11.2 %
NEUTROPHILS # BLD: 5 K/UL (ref 1.7–7.7)
NEUTROPHILS NFR BLD: 65.9 %
PERFORMED ON: ABNORMAL
PLATELET # BLD AUTO: 202 K/UL (ref 135–450)
PMV BLD AUTO: 9.8 FL (ref 5–10.5)
POTASSIUM SERPL-SCNC: 3.1 MMOL/L (ref 3.5–5.1)
PROTHROMBIN TIME: 13.5 SEC (ref 11.5–14.8)
RBC # BLD AUTO: 3.28 M/UL (ref 4.2–5.9)
SODIUM SERPL-SCNC: 131 MMOL/L (ref 136–145)
WBC # BLD AUTO: 7.5 K/UL (ref 4–11)

## 2023-10-22 PROCEDURE — 93005 ELECTROCARDIOGRAM TRACING: CPT | Performed by: HOSPITALIST

## 2023-10-22 PROCEDURE — 6360000002 HC RX W HCPCS

## 2023-10-22 PROCEDURE — 93010 ELECTROCARDIOGRAM REPORT: CPT | Performed by: INTERNAL MEDICINE

## 2023-10-22 PROCEDURE — 2580000003 HC RX 258: Performed by: HOSPITALIST

## 2023-10-22 PROCEDURE — 90945 DIALYSIS ONE EVALUATION: CPT

## 2023-10-22 PROCEDURE — 36415 COLL VENOUS BLD VENIPUNCTURE: CPT

## 2023-10-22 PROCEDURE — 86900 BLOOD TYPING SEROLOGIC ABO: CPT

## 2023-10-22 PROCEDURE — 85025 COMPLETE CBC W/AUTO DIFF WBC: CPT

## 2023-10-22 PROCEDURE — 6370000000 HC RX 637 (ALT 250 FOR IP): Performed by: NURSE PRACTITIONER

## 2023-10-22 PROCEDURE — 1200000000 HC SEMI PRIVATE

## 2023-10-22 PROCEDURE — 6370000000 HC RX 637 (ALT 250 FOR IP): Performed by: HOSPITALIST

## 2023-10-22 PROCEDURE — 85610 PROTHROMBIN TIME: CPT

## 2023-10-22 PROCEDURE — 83735 ASSAY OF MAGNESIUM: CPT

## 2023-10-22 PROCEDURE — 6370000000 HC RX 637 (ALT 250 FOR IP): Performed by: PHYSICIAN ASSISTANT

## 2023-10-22 PROCEDURE — 86850 RBC ANTIBODY SCREEN: CPT

## 2023-10-22 PROCEDURE — 2580000003 HC RX 258: Performed by: INTERNAL MEDICINE

## 2023-10-22 PROCEDURE — 6370000000 HC RX 637 (ALT 250 FOR IP)

## 2023-10-22 PROCEDURE — 86901 BLOOD TYPING SEROLOGIC RH(D): CPT

## 2023-10-22 PROCEDURE — 97530 THERAPEUTIC ACTIVITIES: CPT

## 2023-10-22 PROCEDURE — 97535 SELF CARE MNGMENT TRAINING: CPT

## 2023-10-22 PROCEDURE — 6370000000 HC RX 637 (ALT 250 FOR IP): Performed by: INTERNAL MEDICINE

## 2023-10-22 PROCEDURE — 80048 BASIC METABOLIC PNL TOTAL CA: CPT

## 2023-10-22 PROCEDURE — 6370000000 HC RX 637 (ALT 250 FOR IP): Performed by: STUDENT IN AN ORGANIZED HEALTH CARE EDUCATION/TRAINING PROGRAM

## 2023-10-22 RX ORDER — POTASSIUM CHLORIDE 20 MEQ/1
20 TABLET, EXTENDED RELEASE ORAL ONCE
Status: DISCONTINUED | OUTPATIENT
Start: 2023-10-22 | End: 2023-10-22

## 2023-10-22 RX ORDER — POTASSIUM CHLORIDE 20 MEQ/1
40 TABLET, EXTENDED RELEASE ORAL ONCE
Status: DISCONTINUED | OUTPATIENT
Start: 2023-10-22 | End: 2023-10-22

## 2023-10-22 RX ORDER — POTASSIUM CHLORIDE 20 MEQ/1
40 TABLET, EXTENDED RELEASE ORAL ONCE
Status: COMPLETED | OUTPATIENT
Start: 2023-10-22 | End: 2023-10-22

## 2023-10-22 RX ADMIN — DOXYCYCLINE HYCLATE 100 MG: 100 TABLET, COATED ORAL at 22:04

## 2023-10-22 RX ADMIN — Medication 1 CAPSULE: at 09:10

## 2023-10-22 RX ADMIN — RANOLAZINE 500 MG: 500 TABLET, EXTENDED RELEASE ORAL at 22:04

## 2023-10-22 RX ADMIN — ISOSORBIDE MONONITRATE 30 MG: 30 TABLET, EXTENDED RELEASE ORAL at 09:10

## 2023-10-22 RX ADMIN — CARVEDILOL 12.5 MG: 6.25 TABLET, FILM COATED ORAL at 09:10

## 2023-10-22 RX ADMIN — CLOPIDOGREL 75 MG: 75 TABLET, FILM COATED ORAL at 15:22

## 2023-10-22 RX ADMIN — RANOLAZINE 500 MG: 500 TABLET, EXTENDED RELEASE ORAL at 09:09

## 2023-10-22 RX ADMIN — CALCITRIOL CAPSULES 0.25 MCG 0.25 MCG: 0.25 CAPSULE ORAL at 22:04

## 2023-10-22 RX ADMIN — SACUBITRIL AND VALSARTAN 1 TABLET: 49; 51 TABLET, FILM COATED ORAL at 22:04

## 2023-10-22 RX ADMIN — ASPIRIN 81 MG: 81 TABLET, COATED ORAL at 09:10

## 2023-10-22 RX ADMIN — CARVEDILOL 12.5 MG: 6.25 TABLET, FILM COATED ORAL at 17:21

## 2023-10-22 RX ADMIN — CALCITRIOL CAPSULES 0.25 MCG 0.25 MCG: 0.25 CAPSULE ORAL at 09:10

## 2023-10-22 RX ADMIN — DOXYCYCLINE HYCLATE 100 MG: 100 TABLET, COATED ORAL at 09:10

## 2023-10-22 RX ADMIN — POTASSIUM CHLORIDE 40 MEQ: 1500 TABLET, EXTENDED RELEASE ORAL at 15:25

## 2023-10-22 RX ADMIN — FUROSEMIDE 40 MG: 40 TABLET ORAL at 17:21

## 2023-10-22 RX ADMIN — FUROSEMIDE 40 MG: 40 TABLET ORAL at 09:09

## 2023-10-22 RX ADMIN — Medication 10 ML: at 22:06

## 2023-10-22 RX ADMIN — ANTACID TABLETS 500 MG: 500 TABLET, CHEWABLE ORAL at 01:06

## 2023-10-22 RX ADMIN — SACUBITRIL AND VALSARTAN 1 TABLET: 49; 51 TABLET, FILM COATED ORAL at 09:10

## 2023-10-22 RX ADMIN — HEPARIN SODIUM 5000 UNITS: 5000 INJECTION INTRAVENOUS; SUBCUTANEOUS at 22:15

## 2023-10-22 RX ADMIN — NITROGLYCERIN 0.4 MG: 0.4 TABLET, ORALLY DISINTEGRATING SUBLINGUAL at 07:38

## 2023-10-22 RX ADMIN — Medication 10 ML: at 09:11

## 2023-10-22 RX ADMIN — Medication 1 CAPSULE: at 17:21

## 2023-10-22 RX ADMIN — HEPARIN SODIUM 5000 UNITS: 5000 INJECTION INTRAVENOUS; SUBCUTANEOUS at 06:31

## 2023-10-22 RX ADMIN — Medication 10 ML: at 09:30

## 2023-10-22 RX ADMIN — ROSUVASTATIN 10 MG: 20 TABLET, FILM COATED ORAL at 22:04

## 2023-10-22 ASSESSMENT — PAIN SCALES - GENERAL
PAINLEVEL_OUTOF10: 0
PAINLEVEL_OUTOF10: 0
PAINLEVEL_OUTOF10: 3
PAINLEVEL_OUTOF10: 0
PAINLEVEL_OUTOF10: 2

## 2023-10-22 ASSESSMENT — PAIN DESCRIPTION - ORIENTATION: ORIENTATION: LEFT

## 2023-10-22 ASSESSMENT — PAIN DESCRIPTION - LOCATION
LOCATION: FOOT
LOCATION: CHEST

## 2023-10-22 NOTE — PROGRESS NOTES
Patients blood sugar 53. Administered carbohydrates and rechecked blood sugar per protocol. See flow sheet for details.

## 2023-10-22 NOTE — PROGRESS NOTES
[address] [facility]ISTS PROGRESS NOTE    10/20/2023 9:25 AM        Name: Radha Shelby .              Admitted: 10/16/2023  Primary Care Provider: Ishan Mark MD (Tel: [phone])        Subjective:    Admitted with infected left foot. Underwent I and D. Had rapid response for chest pain. EKG neg for ischemia. Patient was given a nitro with resolution of his symptoms. He takes nitro a few times a month. Was supposed to have GXT a few weeks ago but had to reschedule. Had stress test which was abnormal.    This am feeling well, has some left foot pain. No further cp.      Reviewed interval ancillary notes    Current Medications  carvedilol (COREG) tablet 12.5 mg, BID WC  furosemide (LASIX) tablet 40 mg, BID  calcium carbonate (TUMS) chewable tablet 500 mg, TID PRN  dianeal lo-miller (ULTRABAG) 2.5% 2,500 mL, Continuous  dianeal lo-miller 1.5% 2,500 mL, Continuous  doxycycline hyclate (VIBRA-TABS) tablet 100 mg, 2 times per day  aminophylline injection 100 mg, Once  sodium chloride flush 0.9 % injection 5-40 mL, 2 times per day  sodium chloride flush 0.9 % injection 5-40 mL, PRN  0.9 % sodium chloride infusion, PRN  nitroGLYCERIN (NITROSTAT) SL tablet 0.4 mg, Q5 Min PRN  heparin (porcine) injection 5,000 Units, 3 times per day  Insulin Pump - Bolus Dose , 4x Daily AC & HS  glucagon injection 1 mg, PRN  Insulin Pump - Basal Dose , Daily  morphine (PF) injection 2 mg, Q4H PRN  calcitRIOL (ROCALTROL) capsule 0.25 mcg, BID  isosorbide mononitrate (IMDUR) extended release tablet 30 mg, Daily  ranolazine (RANEXA) extended release tablet 500 mg, BID  rosuvastatin (CRESTOR) tablet 10 mg, Nightly  sacubitril-valsartan (ENTRESTO) 49-51 MG per tablet 1 tablet, BID  dextrose bolus 10% 125 mL, PRN   Or  dextrose bolus 10% 250 mL, PRN  dextrose 10 % infusion, Continuous PRN  sodium chloride flush 0.9 % injection 5-40 mL, 2 times per day  sodium chloride flush 0.9

## 2023-10-22 NOTE — PROGRESS NOTES
[address] Department   Phone: [phone]    Occupational Therapy    [] Initial Evaluation            [x] Daily Treatment Note         [] Discharge Summary      Patient: Dana Quijano   : 1965   MRN: [id number]   Date of Service:  10/22/2023    Admitting Diagnosis:  Left foot infection  Current Admission Summary: a 66 y.o. male who presents to the emergency room due to wound to the his left foot that is been present for several months however its been improving except for the last week or so. Patient states that when he walks he has worsening pain in the area as well. He has noticed some foul odor and discharge coming from the foot. He does see Dr. Taj Stout from podiatry but has not seen him in over 2 months. He is not on any antibiotics recently. 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 Past Surgical History:  has a past surgical history that includes Cardiac surgery (); Coronary artery bypass graft; Cardiac catheterization; hernia repair; Tonsillectomy; LAPAROSCOPY INSERTION PERITONEAL CATHETER (N/A, 2020); Carotid stent placement (Right); Bladder surgery (Left, 2023); and Foot Debridement (Left, 10/16/2023). Discharge Recommendations: Dana Quijano scored a 17/24 on the AM-PAC ADL Inpatient form. Current research shows that an AM-PAC score of 17 or less is typically not associated with a discharge to the patient's home setting.  Based on the patient's AM-PAC score and support  Dynamic Standing Balance: poor (+): requires min (A) to maintain balance  Comments:    Other Therapeutic Interventions  Pt participated in There Ex seated in room chair with Light resistance Theraband to promote UB strength and endurance required for current LLE WB restrictions. Pt tolerated well and is highly motivated to participate, Medium resistance band provided with verbal HEP instructions provided. Pt verbalized understanding and satisfaction. Elbow Flexion (1 set of 10 reps B)  Elbow Extension (1 set of 10 reps B)  Scapular Protraction/Retraction (1 set of 10 reps B)  Shoulder Flexion (1 set of 10 reps B)    Functional Outcomes  AM-PAC Inpatient Daily Activity Raw Score: 17    Cognition  WFL  Orientation:    alert and oriented x 4  Command Following:   [facility]     Education  Barriers To Learning: none  Patient Education: patient educated on goals, OT role and benefits, plan of care, energy conservation, transfer training, discharge recommendations  Learning Assessment:  patient verbalizes and demonstrates understanding    Assessment  Activity Tolerance: Tolerated Well  Impairments Requiring Therapeutic Intervention: decreased functional mobility, decreased ADL status, decreased strength, decreased endurance, decreased balance, increased pain  Prognosis: good  Clinical Assessment: The patient is a 66 y.o. male who presents below their baseline level of function due to above deficits, associated with left foot infection. Typically, pt is IND with use of SPC with functional mobility and IND with ADL/IADLs. Currently, pt is requiring Setup to drea surg shoe, Jovani for functional transfers and less-than-household distance ambulation. Pt is highly motivated to participate in therapy and would be able to tolerate up to three hours of therapy a day in rehab setting. Pt will likely need re-eval following scheduled sx 10/23.  Continued OT indicated in order to promote return to PLOF    Safety Interventions:

## 2023-10-22 NOTE — PROGRESS NOTES
[address] [facility]    10/22/2023 11:11 AM        Name: Sultana Cordero .              Admitted: 10/16/2023  Primary Care Provider: Kiah Desai MD (Tel: [phone])        Subjective:    Admitted with infected left foot. Underwent I and D. Had rapid response a few days ago for chest pain. EKG neg for ischemia. Patient was given a nitro with resolution of his symptoms. He takes nitro a few times a month. Had GXT Thursday which was abnormal. Cardiology on board. No need for left heart cath at this time. Continue to take nitro intermittently. Going back to OR tomorrow.        Reviewed interval ancillary notes    Current Medications  carvedilol (COREG) tablet 12.5 mg, BID WC  furosemide (LASIX) tablet 40 mg, BID  calcium carbonate (TUMS) chewable tablet 500 mg, TID PRN  dianeal lo-miller (ULTRABAG) 2.5% 2,500 mL, Continuous  dianeal lo-miller 1.5% 2,500 mL, Continuous  doxycycline hyclate (VIBRA-TABS) tablet 100 mg, 2 times per day  aminophylline injection 100 mg, Once  sodium chloride flush 0.9 % injection 5-40 mL, 2 times per day  sodium chloride flush 0.9 % injection 5-40 mL, PRN  0.9 % sodium chloride infusion, PRN  nitroGLYCERIN (NITROSTAT) SL tablet 0.4 mg, Q5 Min PRN  heparin (porcine) injection 5,000 Units, 3 times per day  Insulin Pump - Bolus Dose , 4x Daily AC & HS  glucagon injection 1 mg, PRN  Insulin Pump - Basal Dose , Daily  morphine (PF) injection 2 mg, Q4H PRN  calcitRIOL (ROCALTROL) capsule 0.25 mcg, BID  clopidogrel (PLAVIX) tablet 75 mg, Daily  isosorbide mononitrate (IMDUR) extended release tablet 30 mg, Daily  ranolazine (RANEXA) extended release tablet 500 mg, BID  rosuvastatin (CRESTOR) tablet 10 mg, Nightly  sacubitril-valsartan (ENTRESTO) 49-51 MG per tablet 1 tablet, BID  dextrose bolus 10% 125 mL, PRN   Or  dextrose bolus 10% 250 mL, PRN  dextrose 10 % infusion, Continuous PRN  sodium chloride flush 0.9 % Final Result      VL Extremity Venous Left   Final Result      XR CHEST PORTABLE   Final Result   Negative portable chest.         MRI FOOT LEFT WO CONTRAST   Final Result   Suspect focal collection of gas in the plantar soft tissues at the end of a   fistulous track extending to lateral foot. This likely reflects a prior   intervention. No obvious marrow edema within the 5th metatarsal to suggest osteomyelitis. Correlation with described recent bone biopsy may be helpful. Advanced tarsal metatarsal joint space arthrosis. XR FOOT LEFT (MIN 3 VIEWS)   Final Result   Interval bone biopsy. XR CHEST (2 VW)   Final Result   No acute cardiopulmonary findings. XR FOOT LEFT (MIN 3 VIEWS)   Final Result   Soft tissue gas, swelling and equivocal but likely mild erosion along the   cortex of the base of the 5th metatarsal.  This would be radiographic   evidence for osteomyelitis in the correct clinical setting. Recommend repeat   MRI with contrast as appropriate to confirm. Cardiac cath: 4/9/21:  Findings:  Artery Findings/Result   LM Normal   % ostial   Cx 100% mid   % ostial   RCA Nondominant, Smaller RV marginal with mid 70% and distal 90% lesions supplying collateral to distal Cx (old finding), small but could potentially be stented if symptoms merit   S-OM Patent, 99% ostial with ABEL 1 flow   L-LAD patent   LVEDP 6   LVG 35%, inferior hk   Intervention:         PCI of SVG-OM1 with 3.5X23 Xience MARIIA (PD with 3.75NC throughout)    Echo: 6/7/23:   Summary   Definity was used to assist with endocardial border delineation. Moderately dilated left ventricular cavity size. Normal left ventricular   wall thickness. Moderately diminished left ventricular systolic function   with an estimated ejection fraction of 35%. Global hypokinesis with regional   variations. No evidence of apical thrombus with Definity.    Grade I diastolic dysfunction with normal LV filling

## 2023-10-22 NOTE — PLAN OF CARE
Problem: Chronic Conditions and Co-morbidities  Goal: Patient's chronic conditions and co-morbidity symptoms are monitored and maintained or improved  Outcome: Progressing  Flowsheets (Taken 10/21/2023 2000)  Care Plan - Patient's Chronic Conditions and Co-Morbidity Symptoms are Monitored and Maintained or Improved:   Monitor and assess patient's chronic conditions and comorbid symptoms for stability, deterioration, or improvement   Collaborate with multidisciplinary team to address chronic and comorbid conditions and prevent exacerbation or deterioration   Update acute care plan with appropriate goals if chronic or comorbid symptoms are exacerbated and prevent overall improvement and discharge     Problem: Pain  Goal: Verbalizes/displays adequate comfort level or baseline comfort level  Outcome: Progressing  Flowsheets (Taken 10/21/2023 2200)  Verbalizes/displays adequate comfort level or baseline comfort level:   Encourage patient to monitor pain and request assistance   Assess pain using appropriate pain scale   Administer analgesics based on type and severity of pain and evaluate response   Implement non-pharmacological measures as appropriate and evaluate response   Consider cultural and social influences on pain and pain management   Notify Licensed Independent Practitioner if interventions unsuccessful or patient reports new pain     Problem: Safety - Adult  Goal: Free from fall injury  Outcome: Progressing  Flowsheets (Taken 10/21/2023 8224)  Free From Fall Injury:   Instruct family/caregiver on patient safety   Based on caregiver fall risk screen, instruct family/caregiver to ask for assistance with transferring infant if caregiver noted to have fall risk factors     Problem: Discharge Planning  Goal: Discharge to home or other facility with appropriate resources  Outcome: Progressing  Flowsheets (Taken 10/21/2023 2000)  Discharge to home or other facility with appropriate resources:   Identify barriers to discharge with patient and caregiver   Arrange for needed discharge resources and transportation as appropriate   Identify discharge learning needs (meds, wound care, etc)   Arrange for interpreters to assist at discharge as needed   Refer to discharge planning if patient needs post-hospital services based on physician order or complex needs related to functional status, cognitive ability or social support system     Problem: ABCDS Injury Assessment  Goal: Absence of physical injury  Outcome: Progressing  Flowsheets (Taken 10/21/2023 0796)  Absence of Physical Injury: Implement safety measures based on patient assessment     Problem: Skin/Tissue Integrity  Goal: Absence of new skin breakdown  Description: 1. Monitor for areas of redness and/or skin breakdown  2. Assess vascular access sites hourly  3. Every 4-6 hours minimum:  Change oxygen saturation probe site  4. Every 4-6 hours:  If on nasal continuous positive airway pressure, respiratory therapy assess nares and determine need for appliance change or resting period.   Outcome: Progressing

## 2023-10-23 ENCOUNTER — APPOINTMENT (OUTPATIENT)
Dept: ULTRASOUND IMAGING | Age: 58
DRG: 622 | End: 2023-10-23
Payer: MEDICARE

## 2023-10-23 ENCOUNTER — ANESTHESIA (OUTPATIENT)
Dept: OPERATING ROOM | Age: 58
DRG: 622 | End: 2023-10-23
Payer: MEDICARE

## 2023-10-23 PROBLEM — Z71.3 WEIGHT LOSS COUNSELING, ENCOUNTER FOR: Status: ACTIVE | Noted: 2023-07-05

## 2023-10-23 LAB
ANION GAP SERPL CALCULATED.3IONS-SCNC: 16 MMOL/L (ref 3–16)
BASOPHILS # BLD: 0 K/UL (ref 0–0.2)
BASOPHILS NFR BLD: 0.5 %
BUN SERPL-MCNC: 47 MG/DL (ref 7–20)
CALCIUM SERPL-MCNC: 9 MG/DL (ref 8.3–10.6)
CHLORIDE SERPL-SCNC: 96 MMOL/L (ref 99–110)
CO2 SERPL-SCNC: 23 MMOL/L (ref 21–32)
CREAT SERPL-MCNC: 8.8 MG/DL (ref 0.9–1.3)
DEPRECATED RDW RBC AUTO: 14.4 % (ref 12.4–15.4)
EOSINOPHIL # BLD: 0.2 K/UL (ref 0–0.6)
EOSINOPHIL NFR BLD: 3.9 %
GFR SERPLBLD CREATININE-BSD FMLA CKD-EPI: 6 ML/MIN/{1.73_M2}
GLUCOSE BLD-MCNC: 120 MG/DL (ref 70–99)
GLUCOSE BLD-MCNC: 150 MG/DL (ref 70–99)
GLUCOSE BLD-MCNC: 194 MG/DL (ref 70–99)
GLUCOSE BLD-MCNC: 223 MG/DL (ref 70–99)
GLUCOSE SERPL-MCNC: 206 MG/DL (ref 70–99)
HCT VFR BLD AUTO: 33.6 % (ref 40.5–52.5)
HGB BLD-MCNC: 10.9 G/DL (ref 13.5–17.5)
LYMPHOCYTES # BLD: 1.6 K/UL (ref 1–5.1)
LYMPHOCYTES NFR BLD: 29.7 %
MCH RBC QN AUTO: 29.6 PG (ref 26–34)
MCHC RBC AUTO-ENTMCNC: 32.4 G/DL (ref 31–36)
MCV RBC AUTO: 91.5 FL (ref 80–100)
MONOCYTES # BLD: 0.6 K/UL (ref 0–1.3)
MONOCYTES NFR BLD: 11.5 %
NEUTROPHILS # BLD: 2.9 K/UL (ref 1.7–7.7)
NEUTROPHILS NFR BLD: 54.4 %
PERFORMED ON: ABNORMAL
PLATELET # BLD AUTO: 246 K/UL (ref 135–450)
PMV BLD AUTO: 9.6 FL (ref 5–10.5)
POTASSIUM SERPL-SCNC: 3.5 MMOL/L (ref 3.5–5.1)
RBC # BLD AUTO: 3.67 M/UL (ref 4.2–5.9)
SODIUM SERPL-SCNC: 135 MMOL/L (ref 136–145)
WBC # BLD AUTO: 5.3 K/UL (ref 4–11)

## 2023-10-23 PROCEDURE — 80048 BASIC METABOLIC PNL TOTAL CA: CPT

## 2023-10-23 PROCEDURE — 3700000000 HC ANESTHESIA ATTENDED CARE: Performed by: PODIATRIST

## 2023-10-23 PROCEDURE — 2500000003 HC RX 250 WO HCPCS: Performed by: NURSE ANESTHETIST, CERTIFIED REGISTERED

## 2023-10-23 PROCEDURE — 76770 US EXAM ABDO BACK WALL COMP: CPT

## 2023-10-23 PROCEDURE — 7100000000 HC PACU RECOVERY - FIRST 15 MIN: Performed by: PODIATRIST

## 2023-10-23 PROCEDURE — 90945 DIALYSIS ONE EVALUATION: CPT

## 2023-10-23 PROCEDURE — 7100000001 HC PACU RECOVERY - ADDTL 15 MIN: Performed by: PODIATRIST

## 2023-10-23 PROCEDURE — 2580000003 HC RX 258

## 2023-10-23 PROCEDURE — 2580000003 HC RX 258: Performed by: HOSPITALIST

## 2023-10-23 PROCEDURE — 6370000000 HC RX 637 (ALT 250 FOR IP)

## 2023-10-23 PROCEDURE — 6360000002 HC RX W HCPCS: Performed by: NURSE ANESTHETIST, CERTIFIED REGISTERED

## 2023-10-23 PROCEDURE — 2580000003 HC RX 258: Performed by: INTERNAL MEDICINE

## 2023-10-23 PROCEDURE — 99233 SBSQ HOSP IP/OBS HIGH 50: CPT | Performed by: INTERNAL MEDICINE

## 2023-10-23 PROCEDURE — 6360000002 HC RX W HCPCS: Performed by: PODIATRIST

## 2023-10-23 PROCEDURE — 6370000000 HC RX 637 (ALT 250 FOR IP): Performed by: PODIATRIST

## 2023-10-23 PROCEDURE — 6370000000 HC RX 637 (ALT 250 FOR IP): Performed by: NURSE PRACTITIONER

## 2023-10-23 PROCEDURE — 6370000000 HC RX 637 (ALT 250 FOR IP): Performed by: INTERNAL MEDICINE

## 2023-10-23 PROCEDURE — 6370000000 HC RX 637 (ALT 250 FOR IP): Performed by: PHYSICIAN ASSISTANT

## 2023-10-23 PROCEDURE — 2709999900 HC NON-CHARGEABLE SUPPLY: Performed by: PODIATRIST

## 2023-10-23 PROCEDURE — 3600000002 HC SURGERY LEVEL 2 BASE: Performed by: PODIATRIST

## 2023-10-23 PROCEDURE — 3700000001 HC ADD 15 MINUTES (ANESTHESIA): Performed by: PODIATRIST

## 2023-10-23 PROCEDURE — 1200000000 HC SEMI PRIVATE

## 2023-10-23 PROCEDURE — 0HRNXK3 REPLACEMENT OF LEFT FOOT SKIN WITH NONAUTOLOGOUS TISSUE SUBSTITUTE, FULL THICKNESS, EXTERNAL APPROACH: ICD-10-PCS | Performed by: PODIATRIST

## 2023-10-23 PROCEDURE — 2500000003 HC RX 250 WO HCPCS: Performed by: PODIATRIST

## 2023-10-23 PROCEDURE — 3600000012 HC SURGERY LEVEL 2 ADDTL 15MIN: Performed by: PODIATRIST

## 2023-10-23 PROCEDURE — 85025 COMPLETE CBC W/AUTO DIFF WBC: CPT

## 2023-10-23 PROCEDURE — 6370000000 HC RX 637 (ALT 250 FOR IP): Performed by: HOSPITALIST

## 2023-10-23 PROCEDURE — 0JBR0ZZ EXCISION OF LEFT FOOT SUBCUTANEOUS TISSUE AND FASCIA, OPEN APPROACH: ICD-10-PCS | Performed by: PODIATRIST

## 2023-10-23 PROCEDURE — 6360000002 HC RX W HCPCS

## 2023-10-23 DEVICE — GRAFT HUM TISS W2XL4CM CRYOPRESERVED PLCNTA TISS UMB AMNION: Type: IMPLANTABLE DEVICE | Site: FOOT | Status: FUNCTIONAL

## 2023-10-23 RX ORDER — HYDROMORPHONE HYDROCHLORIDE 2 MG/ML
0.5 INJECTION, SOLUTION INTRAMUSCULAR; INTRAVENOUS; SUBCUTANEOUS EVERY 5 MIN PRN
Status: DISCONTINUED | OUTPATIENT
Start: 2023-10-23 | End: 2023-10-23 | Stop reason: HOSPADM

## 2023-10-23 RX ORDER — HEPARIN SODIUM 5000 [USP'U]/ML
5000 INJECTION, SOLUTION INTRAVENOUS; SUBCUTANEOUS EVERY 8 HOURS SCHEDULED
Status: DISCONTINUED | OUTPATIENT
Start: 2023-10-23 | End: 2023-10-24 | Stop reason: HOSPADM

## 2023-10-23 RX ORDER — LIDOCAINE HYDROCHLORIDE 10 MG/ML
INJECTION, SOLUTION EPIDURAL; INFILTRATION; INTRACAUDAL; PERINEURAL
Status: COMPLETED | OUTPATIENT
Start: 2023-10-23 | End: 2023-10-23

## 2023-10-23 RX ORDER — BACITRACIN ZINC AND POLYMYXIN B SULFATE 500; 1000 [USP'U]/G; [USP'U]/G
OINTMENT TOPICAL
Status: COMPLETED | OUTPATIENT
Start: 2023-10-23 | End: 2023-10-23

## 2023-10-23 RX ORDER — AMOXICILLIN AND CLAVULANATE POTASSIUM 500; 125 MG/1; MG/1
1 TABLET, FILM COATED ORAL
Status: DISCONTINUED | OUTPATIENT
Start: 2023-10-23 | End: 2023-10-24 | Stop reason: HOSPADM

## 2023-10-23 RX ORDER — SODIUM CHLORIDE 0.9 % (FLUSH) 0.9 %
5-40 SYRINGE (ML) INJECTION PRN
Status: DISCONTINUED | OUTPATIENT
Start: 2023-10-23 | End: 2023-10-23 | Stop reason: HOSPADM

## 2023-10-23 RX ORDER — SODIUM CHLORIDE 9 MG/ML
INJECTION, SOLUTION INTRAVENOUS PRN
Status: DISCONTINUED | OUTPATIENT
Start: 2023-10-23 | End: 2023-10-23 | Stop reason: HOSPADM

## 2023-10-23 RX ORDER — MIDAZOLAM HYDROCHLORIDE 1 MG/ML
INJECTION INTRAMUSCULAR; INTRAVENOUS PRN
Status: DISCONTINUED | OUTPATIENT
Start: 2023-10-23 | End: 2023-10-23 | Stop reason: SDUPTHER

## 2023-10-23 RX ORDER — BUPIVACAINE HYDROCHLORIDE 5 MG/ML
INJECTION, SOLUTION EPIDURAL; INTRACAUDAL
Status: COMPLETED | OUTPATIENT
Start: 2023-10-23 | End: 2023-10-23

## 2023-10-23 RX ORDER — HYDROMORPHONE HYDROCHLORIDE 2 MG/ML
0.25 INJECTION, SOLUTION INTRAMUSCULAR; INTRAVENOUS; SUBCUTANEOUS EVERY 5 MIN PRN
Status: DISCONTINUED | OUTPATIENT
Start: 2023-10-23 | End: 2023-10-23 | Stop reason: HOSPADM

## 2023-10-23 RX ORDER — PROPOFOL 10 MG/ML
INJECTION, EMULSION INTRAVENOUS CONTINUOUS PRN
Status: DISCONTINUED | OUTPATIENT
Start: 2023-10-23 | End: 2023-10-23 | Stop reason: SDUPTHER

## 2023-10-23 RX ORDER — LIDOCAINE HYDROCHLORIDE 20 MG/ML
INJECTION, SOLUTION INFILTRATION; PERINEURAL PRN
Status: DISCONTINUED | OUTPATIENT
Start: 2023-10-23 | End: 2023-10-23 | Stop reason: SDUPTHER

## 2023-10-23 RX ORDER — SODIUM CHLORIDE 0.9 % (FLUSH) 0.9 %
5-40 SYRINGE (ML) INJECTION EVERY 12 HOURS SCHEDULED
Status: DISCONTINUED | OUTPATIENT
Start: 2023-10-23 | End: 2023-10-23 | Stop reason: HOSPADM

## 2023-10-23 RX ORDER — ONDANSETRON 2 MG/ML
INJECTION INTRAMUSCULAR; INTRAVENOUS PRN
Status: DISCONTINUED | OUTPATIENT
Start: 2023-10-23 | End: 2023-10-23 | Stop reason: SDUPTHER

## 2023-10-23 RX ORDER — ONDANSETRON 2 MG/ML
4 INJECTION INTRAMUSCULAR; INTRAVENOUS
Status: DISCONTINUED | OUTPATIENT
Start: 2023-10-23 | End: 2023-10-23 | Stop reason: HOSPADM

## 2023-10-23 RX ADMIN — DOXYCYCLINE HYCLATE 100 MG: 100 TABLET, COATED ORAL at 09:27

## 2023-10-23 RX ADMIN — RANOLAZINE 500 MG: 500 TABLET, EXTENDED RELEASE ORAL at 09:26

## 2023-10-23 RX ADMIN — HEPARIN SODIUM 5000 UNITS: 5000 INJECTION INTRAVENOUS; SUBCUTANEOUS at 22:45

## 2023-10-23 RX ADMIN — Medication 1 CAPSULE: at 09:26

## 2023-10-23 RX ADMIN — DOXYCYCLINE HYCLATE 100 MG: 100 TABLET, COATED ORAL at 22:35

## 2023-10-23 RX ADMIN — OXYCODONE HYDROCHLORIDE 10 MG: 5 TABLET ORAL at 17:51

## 2023-10-23 RX ADMIN — CALCITRIOL CAPSULES 0.25 MCG 0.25 MCG: 0.25 CAPSULE ORAL at 09:26

## 2023-10-23 RX ADMIN — FUROSEMIDE 40 MG: 40 TABLET ORAL at 09:26

## 2023-10-23 RX ADMIN — CARVEDILOL 12.5 MG: 6.25 TABLET, FILM COATED ORAL at 16:35

## 2023-10-23 RX ADMIN — PROPOFOL 40 MCG/KG/MIN: 10 INJECTION, EMULSION INTRAVENOUS at 12:01

## 2023-10-23 RX ADMIN — SACUBITRIL AND VALSARTAN 1 TABLET: 49; 51 TABLET, FILM COATED ORAL at 09:26

## 2023-10-23 RX ADMIN — SODIUM CHLORIDE: 9 INJECTION, SOLUTION INTRAVENOUS at 12:18

## 2023-10-23 RX ADMIN — LIDOCAINE HYDROCHLORIDE 100 MG: 20 INJECTION, SOLUTION INFILTRATION; PERINEURAL at 12:01

## 2023-10-23 RX ADMIN — CARVEDILOL 12.5 MG: 6.25 TABLET, FILM COATED ORAL at 09:26

## 2023-10-23 RX ADMIN — ONDANSETRON 4 MG: 2 INJECTION INTRAMUSCULAR; INTRAVENOUS at 12:08

## 2023-10-23 RX ADMIN — Medication 10 ML: at 22:41

## 2023-10-23 RX ADMIN — ISOSORBIDE MONONITRATE 30 MG: 30 TABLET, EXTENDED RELEASE ORAL at 09:26

## 2023-10-23 RX ADMIN — NYSTATIN 500000 UNITS: 100000 SUSPENSION ORAL at 13:48

## 2023-10-23 RX ADMIN — Medication 1 CAPSULE: at 16:35

## 2023-10-23 RX ADMIN — FUROSEMIDE 40 MG: 40 TABLET ORAL at 16:35

## 2023-10-23 RX ADMIN — AMOXICILLIN AND CLAVULANATE POTASSIUM 1 TABLET: 500; 125 TABLET, FILM COATED ORAL at 16:35

## 2023-10-23 RX ADMIN — MIDAZOLAM 0.5 MG: 1 INJECTION INTRAMUSCULAR; INTRAVENOUS at 12:09

## 2023-10-23 RX ADMIN — ROSUVASTATIN 10 MG: 20 TABLET, FILM COATED ORAL at 22:37

## 2023-10-23 RX ADMIN — CALCITRIOL CAPSULES 0.25 MCG 0.25 MCG: 0.25 CAPSULE ORAL at 22:35

## 2023-10-23 RX ADMIN — RANOLAZINE 500 MG: 500 TABLET, EXTENDED RELEASE ORAL at 22:38

## 2023-10-23 RX ADMIN — Medication 10 ML: at 22:39

## 2023-10-23 RX ADMIN — Medication 10 ML: at 09:27

## 2023-10-23 RX ADMIN — ANTACID TABLETS 500 MG: 500 TABLET, CHEWABLE ORAL at 04:32

## 2023-10-23 RX ADMIN — MIDAZOLAM 1 MG: 1 INJECTION INTRAMUSCULAR; INTRAVENOUS at 12:01

## 2023-10-23 RX ADMIN — SACUBITRIL AND VALSARTAN 1 TABLET: 49; 51 TABLET, FILM COATED ORAL at 22:37

## 2023-10-23 RX ADMIN — MIDAZOLAM 0.5 MG: 1 INJECTION INTRAMUSCULAR; INTRAVENOUS at 12:15

## 2023-10-23 RX ADMIN — NYSTATIN 500000 UNITS: 100000 SUSPENSION ORAL at 23:27

## 2023-10-23 ASSESSMENT — PAIN DESCRIPTION - FREQUENCY: FREQUENCY: INTERMITTENT

## 2023-10-23 ASSESSMENT — PAIN SCALES - WONG BAKER
WONGBAKER_NUMERICALRESPONSE: 0

## 2023-10-23 ASSESSMENT — PAIN DESCRIPTION - DESCRIPTORS
DESCRIPTORS: ACHING
DESCRIPTORS: ACHING

## 2023-10-23 ASSESSMENT — ENCOUNTER SYMPTOMS
CONSTIPATION: 0
EYE DISCHARGE: 0
DIARRHEA: 0
EYE REDNESS: 0
ABDOMINAL PAIN: 0
COUGH: 0
NAUSEA: 0
SORE THROAT: 0
SINUS PAIN: 0
SHORTNESS OF BREATH: 0
WHEEZING: 0
RHINORRHEA: 0
BACK PAIN: 0
SINUS PRESSURE: 0

## 2023-10-23 ASSESSMENT — PAIN DESCRIPTION - ORIENTATION
ORIENTATION: LEFT
ORIENTATION: LEFT

## 2023-10-23 ASSESSMENT — PAIN DESCRIPTION - LOCATION
LOCATION: FOOT
LOCATION: FOOT

## 2023-10-23 ASSESSMENT — PAIN SCALES - GENERAL
PAINLEVEL_OUTOF10: 0
PAINLEVEL_OUTOF10: 3
PAINLEVEL_OUTOF10: 0
PAINLEVEL_OUTOF10: 2
PAINLEVEL_OUTOF10: 0
PAINLEVEL_OUTOF10: 0
PAINLEVEL_OUTOF10: 2

## 2023-10-23 ASSESSMENT — PAIN - FUNCTIONAL ASSESSMENT: PAIN_FUNCTIONAL_ASSESSMENT: 0-10

## 2023-10-23 NOTE — PROGRESS NOTES
[facility]    10/23/2023 11:33 AM        Name: Martin Romeo .              Admitted: 10/16/2023  Primary Care Provider: Catrina Benjamin MD (Tel: [phone])        Subjective:    Admitted with infected left foot. Underwent I and D. Had rapid response a few days ago for chest pain. EKG neg for ischemia. Patient was given a nitro with resolution of his symptoms. He takes nitro a few times a month but has been taking it more frequently here. Had GXT Thursday which was abnormal. Cardiology on board. No need for left heart cath at this time. Continue to take nitro intermittently. Going back to OR today. No new complaints. Did have episode of chest pain last night.       Reviewed interval ancillary notes    Current Medications  nystatin (MYCOSTATIN) 802517 UNIT/ML suspension 500,000 Units, TID  carvedilol (COREG) tablet 12.5 mg, BID WC  furosemide (LASIX) tablet 40 mg, BID  calcium carbonate (TUMS) chewable tablet 500 mg, TID PRN  dianeal lo-miller (ULTRABAG) 2.5% 2,500 mL, Continuous  dianeal lo-miller 1.5% 2,500 mL, Continuous  doxycycline hyclate (VIBRA-TABS) tablet 100 mg, 2 times per day  aminophylline injection 100 mg, Once  sodium chloride flush 0.9 % injection 5-40 mL, 2 times per day  sodium chloride flush 0.9 % injection 5-40 mL, PRN  0.9 % sodium chloride infusion, PRN  nitroGLYCERIN (NITROSTAT) SL tablet 0.4 mg, Q5 Min PRN  Insulin Pump - Bolus Dose , 4x Daily AC & HS  glucagon injection 1 mg, PRN  Insulin Pump - Basal Dose , Daily  morphine (PF) injection 2 mg, Q4H PRN  calcitRIOL (ROCALTROL) capsule 0.25 mcg, BID  isosorbide mononitrate (IMDUR) extended release tablet 30 mg, Daily  ranolazine (RANEXA) extended release tablet 500 mg, BID  rosuvastatin (CRESTOR) tablet 10 mg, Nightly  sacubitril-valsartan (ENTRESTO) 49-51 MG per tablet 1 tablet, BID  dextrose bolus 10% 125 mL, PRN   Or  dextrose bolus 10% 250 mL, Exceptions are: Sips of Water with Meds  Code:Full Code  DVT PPX: heparin  Disposition home      Ester Cano PA-C   10/23/2023 11:33 AM

## 2023-10-23 NOTE — PROGRESS NOTES
Treatment initiated without complications or complaints from patient. Patient resting comfortably with VSS upon dialysis RN exit from room. LAURA Hernandez notified of start of treatment and hotline number located on top of machine for any questions about troubleshooting during after hours.

## 2023-10-23 NOTE — PROGRESS NOTES
Treatment time: 9 Hours 54 minutes  Net UF: 526 ml    Treatment completed without complications or complaints from patient. Effluent clear yellow and lines taped to patient per protocol. Patient resting comfortably with VSS upon exiting room. Copy of dialysis treatment record placed in chart, to be scanned into EMR and report given to [employer], RN.

## 2023-10-23 NOTE — OP NOTE
Operative Note      Patient: Sherron Huffman  YOB: 1965  MRN: [id number]    Date of Procedure: 10/23/2023    Pre-Op Diagnosis Codes:     * Diabetic ulcer of toe of left foot associated with diabetes mellitus due to underlying condition, with fat layer exposed ([address]) [C61.195, L97.522]    Post-Op Diagnosis: Same       Procedure(s):  LEFT FOOT INCISION AND DRAINAGE WITH DELAYED PRIMARY CLOSURE AND DERMAL GRAFT APPLICATION    Surgeon(s):  Daxa Dupont DPM    Assistant:   Resident: Dayton Fitch, PGY-2  Student: Arsenio Sullivan MS4  Surgical Assistant: Dom Pham    Anesthesia: General    Hemostasis: anatomic dissection     Injectables: Pre-Op 10 cc of 1% Lidocaine plain and Post-Op 20 cc of 0.5 % Marcaine plain     Materials: 3-0 Nylon     Implants:   -2x4cm Stravic placental tissue graft    Estimated Blood Loss (mL): Minimal    Complications: None    Specimens:   * No specimens in log *    Implants:  Implant Name Type Inv. Item Serial No.  Lot No. LRB No. Used Action   GRAFT HUM TISS [id number] CRYOPRESERVED PLCNTA TISS [employer] AMNION - [id number]  GRAFT HUM TISS [id number] CRYOPRESERVED PLCNTA TISS [employer] AMNION [id number] Left 1 Implanted         Drains: * No LDAs found *    Findings: Wound was debrided down to and including subcutaneous tissue until healthy bleeding margins. Wound did not probe to bone, tunnel or tack. No purulence noted. INDICATIONS FOR PROCEDURE: This patient has signs and symptoms clinically  consistent with the above mentioned preoperative diagnosis. Having failed conservative treatment, it was determined that the patient would benefit from surgical intervention. All potential risks, benefits, and complications were discussed with the patient prior to the scheduling of surgery. All the patient's questions were answered and no guarantees were given. The patient wished to proceed with surgery, and informed written consent was obtained. well. The patient left the OR and was transferred to the PACU with vital signs stable and vascular status intact to all aspects of the left lower extremity. Following short period of postoperative monitoring, the patient will be readmitted to medical floor for further medical management and treatment of their medical comorbid conditions.     Op note was dictated on behalf of Dr. Daxa Dupont ,MOOKIE VickM  Podiatric Resident PGY-2  Pager [phone] or Perfect Serve

## 2023-10-23 NOTE — ANESTHESIA POSTPROCEDURE EVALUATION
Department of Anesthesiology  Postprocedure Note    Patient: Sultana Cordero  MRN: [id number]  YOB: 1965  Date of evaluation: 10/23/2023      Procedure Summary     Date: 10/23/23 Room / Location: [address]    Anesthesia Start: 6861 Anesthesia Stop: 1234    Procedure: LEFT FOOT INCISION AND DRAINAGE WITH DELAYED PRIMARY CLOSURE AND POSSIBLE DERMAL GRAFT APPLICATION (Left: Foot) Diagnosis:       Diabetic ulcer of toe of left foot associated with diabetes mellitus due to underlying condition, with fat layer exposed ([address])      (Diabetic ulcer of toe of left foot associated with diabetes mellitus due to underlying condition, with fat layer exposed ([address]) [U75.704, L97.522])    Surgeons: Tia Padilla DPM Responsible Provider: Johnathan Kelley MD    Anesthesia Type: general, MAC ASA Status: 4          Anesthesia Type: No value filed.     Mary Phase I: Mary Score: 10    Mary Phase II:        Anesthesia Post Evaluation    Patient location during evaluation: PACU  Patient participation: complete - patient participated  Level of consciousness: awake and alert  Airway patency: patent  Nausea & Vomiting: no vomiting and no nausea  Complications: no  Cardiovascular status: hemodynamically stable  Respiratory status: acceptable  Hydration status: stable  Pain management: adequate

## 2023-10-23 NOTE — PROGRESS NOTES
Pt arrived from OR to PACU bay 7. Reported received from [employer] staff. Pt arousable to voice. Surgical incisions dressings in place to L foot. Pt on 3L NC, NSR, and VSS. Will continue to monitor.

## 2023-10-23 NOTE — CONSULTS
Urology Consult Note  [facility]     Patient: Paradise Zamudio MRN: [id number]  Room/Bed: [facility]8264/6878-11   YOB: 1965  Age/Sex: 66 y.o.male  Admission Date: 10/16/2023     Date of Service:  10/23/2023    Consulting Provider: JAYLEN Khoury - CNP CNP  Admitting/Requesting Physician: Tim Goodman MD  Primary Care Physician: Kavita España MD    Reason for Consult: Ureteral stone    ASSESSMENT/PLAN     67 yo male with hx of ESRD on PD, admitted multiple times in September for intractable left flank pain and left hydronephrosis,  s/p urs/ll 2023 at [facility] - Dr. Pam Soliz. Looked as if a blood clot vs stone noted in the left ureter. He removed his stent to string x3 days after the surgery. Now admitted for right diabetic foot wound s/p ID, infectious disease following. Patient has some questions about follow up, urology therefor consulted. Recommendations:  Obtain a AC - resolution of left hydronephrosis? Send urine for Cytology- ordered. May need a left dx ureteroscopy? Outpatient follow up with Dr. Pam Soliz. All patient questions were answered. He understands the plan as listed above. HISTORY     Chief Complaint:   Chief Complaint   Patient presents with    Wound Check     Pt states diabetic and has wound on his right foot. Pt states was sent to wound center to get his foot checked. Pt states Wednesday foot was achy, Friday couldn't walk on it. Pt sttaes last night took his sock off and smelled something \"sour\" states there was fluid coming from wound. History of Present Illness: Paradise Zamudio is a 66 y.o. male with left ureteral stone vs clot s/p ureteroscopy in sept. Onset of symptoms was  in September and now resolved after ureteroscopy course since that time. Symptoms are aggravated by ureteral stone. Symptoms improved with ureteroscopy. Associated symptoms include left flank pain. Patient also reports all symptoms resolved.  He has tried the

## 2023-10-23 NOTE — BRIEF OP NOTE
Brief Postoperative Note      Patient: Meliza Ordoñez  YOB: 1965  MRN: [id number]    Date of Procedure: 10/23/2023    Pre-Op Diagnosis Codes:     * Diabetic ulcer of toe of left foot associated with diabetes mellitus due to underlying condition, with fat layer exposed ([address]) [U81.615, L97.522]    Post-Op Diagnosis: Same       Procedure(s):  LEFT FOOT INCISION AND DRAINAGE WITH DELAYED PRIMARY CLOSURE    Surgeon(s):  Lawanda Jose DPM    Assistant:  Resident: Parisa Avendano, PGY-2  Student: Christine Chung MS4  Surgical Assistant: Lillian Aguilar    Anesthesia: General    Hemostasis: anatomic dissection    Injectables: Pre-Op 10 cc of 1% Lidocaine plain and Post-Op 20 cc of 0.5 % Marcaine plain    Materials: 3-0 Nylon    Implants:   -2x4cm Stravic placental tissue graft    Estimated Blood Loss (mL): Minimal    Complications: None    Specimens:   * No specimens in log *    Implants:  Implant Name Type Inv. Item Serial No.  Lot No. LRB No. Used Action   GRAFT HUM TISS [id number] CRYOPRESERVED PLCNTA TISS [employer] AMNION - [id number]  GRAFT HUM TISS [id number] CRYOPRESERVED PLCNTA TISS UMB AMNION [id number] [employer] Lens- [id number] Left 1 Implanted         Drains: * No LDAs found *    Findings: Wound was debrided down to and including subcutaneous tissue until healthy bleeding margins. Wound did not probe to bone, tunnel or tack. No purulence noted. DISPO: S/p left I&D with [facility]. Procedure felt definitive. NWB to LLE. Continue IV antibiotics per ID.  Patient stable for discharge from podiatric standpoint pending medical clearance and final ID recs      Electronically signed by Parisa Avendano DPM on 10/23/2023 at 12:27 PM

## 2023-10-23 NOTE — PROGRESS NOTES
Pt transferred to room 3324 at this time. A&O with no signs of distress. Tele on, with visual on monitor. Report given to LAURA STAFFORD. V/u and denies questions or further needs at this time.

## 2023-10-23 NOTE — PROGRESS NOTES
Order Questions    Question Answer   Total Volume (L) 10   Therapy Time Duration (Hours) 9   Exchange Volume (L) 2.5   Number of Exchanges 4   Final Fill No     Treatment initiated without complications or complaints from patient. Patient resting comfortably with VSS upon dialysis RN exit from room. Cesario Patterson RN notified of start of treatment and hotline number located on top of machine for any questions about troubleshooting during after hours.

## 2023-10-24 VITALS
HEART RATE: 81 BPM | DIASTOLIC BLOOD PRESSURE: 84 MMHG | SYSTOLIC BLOOD PRESSURE: 153 MMHG | WEIGHT: 194.8 LBS | RESPIRATION RATE: 16 BRPM | TEMPERATURE: 97.6 F | BODY MASS INDEX: 30.57 KG/M2 | HEIGHT: 67 IN | OXYGEN SATURATION: 96 %

## 2023-10-24 LAB
GLUCOSE BLD-MCNC: 167 MG/DL (ref 70–99)
GLUCOSE BLD-MCNC: 97 MG/DL (ref 70–99)
PERFORMED ON: ABNORMAL
PERFORMED ON: NORMAL

## 2023-10-24 PROCEDURE — 97530 THERAPEUTIC ACTIVITIES: CPT

## 2023-10-24 PROCEDURE — 97164 PT RE-EVAL EST PLAN CARE: CPT

## 2023-10-24 PROCEDURE — 6370000000 HC RX 637 (ALT 250 FOR IP)

## 2023-10-24 PROCEDURE — 2580000003 HC RX 258

## 2023-10-24 PROCEDURE — 6360000002 HC RX W HCPCS

## 2023-10-24 PROCEDURE — 97168 OT RE-EVAL EST PLAN CARE: CPT

## 2023-10-24 PROCEDURE — 6370000000 HC RX 637 (ALT 250 FOR IP): Performed by: INTERNAL MEDICINE

## 2023-10-24 PROCEDURE — 97535 SELF CARE MNGMENT TRAINING: CPT

## 2023-10-24 PROCEDURE — 99232 SBSQ HOSP IP/OBS MODERATE 35: CPT | Performed by: INTERNAL MEDICINE

## 2023-10-24 PROCEDURE — 6360000002 HC RX W HCPCS: Performed by: NURSE PRACTITIONER

## 2023-10-24 RX ORDER — LACTOBACILLUS RHAMNOSUS GG 10B CELL
1 CAPSULE ORAL 2 TIMES DAILY WITH MEALS
Qty: 30 CAPSULE | Refills: 0 | Status: SHIPPED | OUTPATIENT
Start: 2023-10-24 | End: 2023-11-08

## 2023-10-24 RX ORDER — DOXYCYCLINE HYCLATE 100 MG
100 TABLET ORAL EVERY 12 HOURS SCHEDULED
Qty: 30 TABLET | Refills: 0 | Status: SHIPPED | OUTPATIENT
Start: 2023-10-24 | End: 2023-11-08

## 2023-10-24 RX ORDER — FUROSEMIDE 40 MG/1
40 TABLET ORAL 2 TIMES DAILY
Qty: 60 TABLET | Refills: 3 | Status: SHIPPED | OUTPATIENT
Start: 2023-10-24

## 2023-10-24 RX ORDER — CARVEDILOL 25 MG/1
12.5 TABLET ORAL 2 TIMES DAILY
Qty: 180 TABLET | Refills: 1 | Status: SHIPPED | OUTPATIENT
Start: 2023-10-24

## 2023-10-24 RX ORDER — AMOXICILLIN AND CLAVULANATE POTASSIUM 500; 125 MG/1; MG/1
1 TABLET, FILM COATED ORAL
Qty: 15 TABLET | Refills: 0 | Status: SHIPPED | OUTPATIENT
Start: 2023-10-25 | End: 2023-11-09

## 2023-10-24 RX ORDER — SODIUM CHLORIDE, SODIUM LACTATE, CALCIUM CHLORIDE, MAGNESIUM CHLORIDE AND DEXTROSE 2.5; 538; 448; 18.3; 5.08 G/100ML; MG/100ML; MG/100ML; MG/100ML; MG/100ML
2500 INJECTION, SOLUTION INTRAPERITONEAL CONTINUOUS
Status: DISCONTINUED | OUTPATIENT
Start: 2023-10-24 | End: 2023-10-24 | Stop reason: HOSPADM

## 2023-10-24 RX ORDER — METOCLOPRAMIDE HYDROCHLORIDE 5 MG/ML
5 INJECTION INTRAMUSCULAR; INTRAVENOUS ONCE
Status: COMPLETED | OUTPATIENT
Start: 2023-10-24 | End: 2023-10-24

## 2023-10-24 RX ADMIN — OXYCODONE HYDROCHLORIDE 10 MG: 5 TABLET ORAL at 10:33

## 2023-10-24 RX ADMIN — NYSTATIN 500000 UNITS: 100000 SUSPENSION ORAL at 10:30

## 2023-10-24 RX ADMIN — ONDANSETRON 4 MG: 2 INJECTION INTRAMUSCULAR; INTRAVENOUS at 10:35

## 2023-10-24 RX ADMIN — DOXYCYCLINE HYCLATE 100 MG: 100 TABLET, COATED ORAL at 10:33

## 2023-10-24 RX ADMIN — OXYCODONE HYDROCHLORIDE 10 MG: 5 TABLET ORAL at 05:55

## 2023-10-24 RX ADMIN — FUROSEMIDE 40 MG: 40 TABLET ORAL at 16:19

## 2023-10-24 RX ADMIN — SACUBITRIL AND VALSARTAN 1 TABLET: 49; 51 TABLET, FILM COATED ORAL at 10:34

## 2023-10-24 RX ADMIN — AMOXICILLIN AND CLAVULANATE POTASSIUM 1 TABLET: 500; 125 TABLET, FILM COATED ORAL at 10:34

## 2023-10-24 RX ADMIN — Medication 1 CAPSULE: at 10:32

## 2023-10-24 RX ADMIN — METOCLOPRAMIDE 5 MG: 5 INJECTION, SOLUTION INTRAMUSCULAR; INTRAVENOUS at 15:52

## 2023-10-24 RX ADMIN — NYSTATIN 500000 UNITS: 100000 SUSPENSION ORAL at 14:11

## 2023-10-24 RX ADMIN — CARVEDILOL 12.5 MG: 6.25 TABLET, FILM COATED ORAL at 10:32

## 2023-10-24 RX ADMIN — HEPARIN SODIUM 5000 UNITS: 5000 INJECTION INTRAVENOUS; SUBCUTANEOUS at 05:48

## 2023-10-24 RX ADMIN — HEPARIN SODIUM 5000 UNITS: 5000 INJECTION INTRAVENOUS; SUBCUTANEOUS at 14:11

## 2023-10-24 RX ADMIN — FUROSEMIDE 40 MG: 40 TABLET ORAL at 10:33

## 2023-10-24 RX ADMIN — ISOSORBIDE MONONITRATE 30 MG: 30 TABLET, EXTENDED RELEASE ORAL at 10:32

## 2023-10-24 RX ADMIN — CARVEDILOL 12.5 MG: 6.25 TABLET, FILM COATED ORAL at 16:19

## 2023-10-24 RX ADMIN — Medication 10 ML: at 10:34

## 2023-10-24 RX ADMIN — CALCITRIOL CAPSULES 0.25 MCG 0.25 MCG: 0.25 CAPSULE ORAL at 10:32

## 2023-10-24 RX ADMIN — Medication 1 CAPSULE: at 16:19

## 2023-10-24 RX ADMIN — RANOLAZINE 500 MG: 500 TABLET, EXTENDED RELEASE ORAL at 10:32

## 2023-10-24 ASSESSMENT — ENCOUNTER SYMPTOMS
DIARRHEA: 0
ABDOMINAL PAIN: 0
WHEEZING: 0
CONSTIPATION: 0
BACK PAIN: 0
SORE THROAT: 0
COUGH: 0
EYE DISCHARGE: 0
SHORTNESS OF BREATH: 0
SINUS PAIN: 0
NAUSEA: 0
SINUS PRESSURE: 0
RHINORRHEA: 0
EYE REDNESS: 0

## 2023-10-24 ASSESSMENT — PAIN SCALES - GENERAL
PAINLEVEL_OUTOF10: 3
PAINLEVEL_OUTOF10: 3
PAINLEVEL_OUTOF10: 6
PAINLEVEL_OUTOF10: 0
PAINLEVEL_OUTOF10: 4

## 2023-10-24 ASSESSMENT — PAIN SCALES - WONG BAKER
WONGBAKER_NUMERICALRESPONSE: 0

## 2023-10-24 ASSESSMENT — PAIN DESCRIPTION - ORIENTATION: ORIENTATION: LEFT

## 2023-10-24 ASSESSMENT — PAIN DESCRIPTION - DESCRIPTORS: DESCRIPTORS: ACHING;TINGLING

## 2023-10-24 ASSESSMENT — PAIN DESCRIPTION - LOCATION: LOCATION: FOOT

## 2023-10-24 NOTE — PROGRESS NOTES
CLINICAL PHARMACY NOTE: MEDS TO BEDS    Total # of Prescriptions Filled: 5   The following medications were delivered to the patient:  Amox/Clav 500/125mg  Acidophilus capsules  Nystatin susp.   Furosemide 40mg  Doxycycline hyclate 100mg    Additional Documentation:     Campbell Thurman approved medication delivery    Medications were delivered to patient's room and patient signed for    Andre Cleveland

## 2023-10-24 NOTE — PLAN OF CARE
Problem: Chronic Conditions and Co-morbidities  Goal: Patient's chronic conditions and co-morbidity symptoms are monitored and maintained or improved  10/24/2023 0450 by Yessenia Hoff RN  Outcome: Progressing  10/24/2023 0449 by Yessenia Hoff RN  Outcome: Progressing  10/24/2023 0449 by Yessenia Hoff RN  Outcome: Progressing     Problem: Pain  Goal: Verbalizes/displays adequate comfort level or baseline comfort level  10/24/2023 0450 by Yessenia Hoff RN  Outcome: Progressing  10/24/2023 0449 by Yessenia Hoff RN  Outcome: Progressing  10/24/2023 0449 by Yessenia Hoff RN  Outcome: Progressing

## 2023-10-24 NOTE — DISCHARGE SUMMARY
IV and tele removed. IV pump clean dry and intact. Pt discharged to home with home care needs. DME & meds delivered to pt room. Paperwork explained and signed by pt. All personal belongings gathered and given to pt. Transport escorted to lobby. Wife picked up.
recommendations given to patient. Follow Up. in 1 week   Disposition. home  Activity. Nwb,  WC ordered for d/c   Diet: ADULT DIET; Regular  ADULT ORAL NUTRITION SUPPLEMENT; Breakfast, Dinner; Wound Healing Oral Supplement      Spent 35 minutes in discharge process.     Signed:  JAYLEN Cartagena CNP     10/24/2023 2:05 PM

## 2023-10-24 NOTE — CARE COORDINATION
10/24/23 1502   IMM Letter   IMM Letter given to Patient/Family/Significant other/Guardian/POA/by:  Serafin Zaldivar gave the patient their second IMM letter and explained the IMM letter to the patient. The patient was in agreement with discharge and in agreement with receiving the letter within four hours of discharge. The patient gave verbal signature due to active nausea.    IMM Letter date given: 10/24/23   IMM Letter time given: 5066           Electronically signed by ROBIN Yu on 10/24/2023 at 3:03 PM

## 2023-10-24 NOTE — PROGRESS NOTES
Urology Progress Note  [facility]    Provider: JAYLEN Fernandez - CNP  Patient ID:  Admission Date: 10/16/2023 Name: Samuel Augustine Date: 10/16/2023 MRN: [id number]   Patient Location: [id number] : 1965  Attending: Karthikeyan Ingram MD Date of Service: 10/24/2023  PCP: Kiah Desai MD     Diagnoses:  1. Diabetic ulcer of midfoot associated with diabetes mellitus due to underlying condition, limited to breakdown of skin, unspecified laterality ([address])    2. Diabetic ulcer of left foot associated with other specified diabetes mellitus, unspecified part of foot, unspecified ulcer stage [facility])         Assessment/Plan:  65 yo male with hx of ESRD on PD, admitted multiple times in September for intractable left flank pain and left hydronephrosis,  s/p urs/ll 2023 at [facility] - Dr. Delonte Byrd. Looked as if a blood clot vs stone noted in the left ureter. He removed his stent to string x3 days after the surgery. Now admitted for right diabetic foot wound s/p ID, infectious disease following. Patient has some questions about follow up, urology therefor consulted.   ===  Hgb 10.9, stable  Urine clear     Recommendations:  AC shows resolution of hydronephrosis  Cytology- pending. Left dx ureteroscopy outpatient? Follow up scheduled with Dr. Delonte Byrd  Can discharge from  standpoint when med cleared    The patient had a chance to ask questions which were answered. he understands the above plan. Subjective:   Sultana Cordero is a 66 y.o. male. He was seen and examined this morning. Today we discussed outpatient follow up.       Objective:   Vitals:  Vitals:    10/24/23 0555   BP:    Pulse:    Resp: 18   Temp:    SpO2:        Intake/Output Summary (Last 24 hours) at 10/24/2023 0849  Last data filed at 10/23/2023 2103  Gross per 24 hour   Intake 1150 ml   Output --   Net 1150 ml     Physical Exam:  Gen: Alert and oriented x3, no acute distress  CV: Regular rate   Resp: unlabored

## 2023-10-24 NOTE — PROGRESS NOTES
[address] [facility] Department   Phone: [phone]    Occupational Therapy    [x] Re- Evaluation            [] Daily Treatment Note         [] Discharge Summary      Patient: Paradise Zamudio   : 1965   MRN: [id number]   Date of Service:  10/24/2023    Admitting Diagnosis:  Left foot infection  Current Admission Summary:  per H&P 10/16: \"Te Kapoor is a 66 y.o. male who presented with presents to ER with complaints of left foot pain. Patient with known history of diabetic wound and therefore he has been going to wound care center. Patient said on Friday or Wednesday of wound was very itchy could not walk on Friday. Last night patient took up small and noted some foul-smelling with fluid coming out from the wound. Patient came to ER for further evaluation subjective chills no fever denies any nausea vomiting. Nothing that makes it better or worse\"     10/17 - I&D of (L) foot, heel WBAT in surgical shoe after (Will need to return to OR for closure)     10/23 -I&D of L foot, NWB. The pt has had chest pain and orthostatic hypotension. Past Medical History:  has a past medical history of Angina at rest, Cardiomyopathy [facility]), Carotid artery stenosis, CHF (congestive heart failure) ([address]), CKD (chronic kidney disease) stage 2, GFR 60-89 ml/min, Coronary artery disease, Diabetic peripheral neuropathy ([address]), Diastolic HF (heart failure) ([address]), Hyperlipidemia with target LDL less than 70, Hypertension goal BP (blood pressure) < 130/80, PVD (peripheral vascular disease) ([address]), Seizures ([address]), and Type 1 diabetes mellitus with chronic kidney disease ([address]). Past Surgical History:  has a past surgical history that includes Cardiac surgery (); Coronary artery bypass graft; Cardiac catheterization; hernia repair; Tonsillectomy; LAPAROSCOPY INSERTION PERITONEAL CATHETER (N/A, 2020); Carotid stent placement (Right); Bladder surgery (Left, 2023);  Foot Debridement (Left, chores  Active :        [x] Yes  [] No  Hand Dominance: [] Left  [x] Right  Current Employment: full time employment. Occupation: t-shirt business  Hobbies: computer stuff, watching sports, hunting  Recent Falls: Pt denies falls the past 6 months    Examination   Vision:   Vision Gross Assessment: Impaired and Vision Corrective Device: wears glasses at all times  Hearing:   [facility]  Observation:   General Observation: L LE ace wrap  Posture:   Mild rounded shoulders, mildly forward flexed head       Subjective  General: Pt supine in bed upon arrival, agreeable to therapy evaluation  Pain: 3/10. Location: L foot  Pain Interventions: pain medication in place prior to arrival, repositioned , and therapy activities modified        Activities of Daily Living  Basic Activities of Daily Living  Grooming: setup assistance uses mouth wash seated in recliner  Lower Extremity Dressing: dep A to doff pants     Comment: Declined addt'l ADLs,    Instrumental Activities of Daily Living  No IADL completed on this date. Functional Mobility  Bed Mobility:  Supine to Sit: modified independent  Scooting: modified independent  Comments: HOB elevated  Transfers:  Sit to stand transfer:contact guard assistance  Stand to sit transfer: contact guard assistance  Stand step transfer: contact guard assistance  Comments: STS from [place] and recliner to [employer] [place], verbal cues for hand placement and gait technique with L LE NWB. Pt hopped onto/off of 2in scale for standing weight. Pt pivots bed>recliner CGA good adherence to L LE NWB, pt reports feeling \"loopy\" with transfers, pt with episode of emesis post stand step transfer. RN aware    Vitals:     Supine - 148/73, HR 75              Sitting -    , HR 84              Standing - 05402,               After transferring to chair and vomitting - 164/82, HR 80       Functional Mobility  No functional mobility completed on this date secondary to emesis after transfer.   Balance:  Static Sitting

## 2023-10-24 NOTE — PLAN OF CARE
Problem: Chronic Conditions and Co-morbidities  Goal: Patient's chronic conditions and co-morbidity symptoms are monitored and maintained or improved  Outcome: Adequate for Discharge     Problem: Pain  Goal: Verbalizes/displays adequate comfort level or baseline comfort level  Outcome: Adequate for Discharge  Flowsheets (Taken 10/24/2023 1015)  Verbalizes/displays adequate comfort level or baseline comfort level:   Encourage patient to monitor pain and request assistance   Assess pain using appropriate pain scale   Administer analgesics based on type and severity of pain and evaluate response     Problem: Safety - Adult  Goal: Free from fall injury  Outcome: Adequate for Discharge     Problem: Discharge Planning  Goal: Discharge to home or other facility with appropriate resources  Outcome: Adequate for Discharge     Problem: ABCDS Injury Assessment  Goal: Absence of physical injury  Outcome: Adequate for Discharge     Problem: Skin/Tissue Integrity  Goal: Absence of new skin breakdown  Description: 1. Monitor for areas of redness and/or skin breakdown  2. Assess vascular access sites hourly  3. Every 4-6 hours minimum:  Change oxygen saturation probe site  4. Every 4-6 hours:  If on nasal continuous positive airway pressure, respiratory therapy assess nares and determine need for appliance change or resting period.   Outcome: Adequate for Discharge

## 2023-10-24 NOTE — CARE COORDINATION
Case Management -  Discharge Note      Patient Name: Jamie Haas                   YOB: 1965            Readmission Risk (Low < 19, Mod (19-27), High > 27): Readmission Risk Score: 26.1    Current PCP: Rena León MD    ([facility]) Important Message from Medicare:    Date: 10/24/2023    PT AM-PAC: 25 /24  OT AM-PAC: 17 /24    Home Care Information:   Is patient resuming current home health care services: No    [address]:   [facility]  [facility], [address]  Phone: [phone]  Fax: [phone]              Services: PT/OT/Nursing  Home Health Order Obtained: Yes    Home health agency notified of discharge. [facility]:  [facility]  Phone:  [phone]  Fax:      [phone]   For Wheelchair      Financial    Payor: MEDICARE / Plan: MEDICARE PART A AND B / Product Type: *No Product type* /     Pharmacy:  Potential assistance Purchasing Medications: No  Meds-to-Beds request: Yes      [id number] [phone] Indira Bel [phone]  [address] [place] [address]  Phone: [phone] Fax: [phone] [phone] Indira Bel [phone]  [address] [place] [address]  Phone: [phone] Fax: [phone]      Notes: Additional Case Management Notes: Pt's wife is to transport pt home. We are waiting on a wheelchair. [facility] may be able to deliver it today to pt's room, if not they will be delivering it tomorrow.         Electronically signed by ROBIN Gómez on 10/24/2023 at 3:11 PM

## 2023-10-24 NOTE — PROGRESS NOTES
Simon Noble was evaluated today and a DME order was entered for a standard wheelchair  ( 20 inch ) because he requires this to successfully complete daily living tasks of eating, bathing, toileting, personal cares, ambulating, grooming, hygiene, dressing upper body, dressing lower body, meal preparation, and taking own medications. A standard manual wheelchair is necessary due to patient's impaired ambulation and mobility restrictions and would be unable to resolve these daily living tasks using a cane or walker. The patient is capable of using a standard wheelchair safely in their home and can maneuver within their home with adequate access. There is a caregiver available to provide necessary assistance. The need for this equipment was discussed with the patient and he understands, is in agreement, and has not expressed an unwillingness to use the wheelchair.

## 2023-10-24 NOTE — DIALYSIS
Treatment time: 9 Hours 14 minutes  Net UF: 235 ml  Dwell Time: 38 minutes gained    Treatment completed without complications or complaints from patient. Effluent clear/yellow and lines taped to patient per protocol. Patient resting comfortably with VSS upon exiting room. Copy of dialysis treatment record placed in chart, to be scanned into EMR and report given to Shaun Thakkar.

## 2023-10-24 NOTE — CARE COORDINATION
[facility] received a referral to this patient for a wheelchair. Wheelchair has been delivered to the patient's room. Thank you for the referral.  Electronically signed by Guadalupe Araujo on 10/24/2023 at 5:24 PM  Cell ph# [phone]    NOTE: After 5:00 pm, Weekends, Holidays: Call [facility] at [phone] to coordinate delivery of home medical equipment.

## 2023-10-24 NOTE — PLAN OF CARE
Problem: Chronic Conditions and Co-morbidities  Goal: Patient's chronic conditions and co-morbidity symptoms are monitored and maintained or improved  10/24/2023 0449 by Jelly Valencia RN  Outcome: Progressing  10/24/2023 0449 by Jelly Valencia RN  Outcome: Progressing     Problem: Pain  Goal: Verbalizes/displays adequate comfort level or baseline comfort level  10/24/2023 0449 by Jelly Valencia RN  Outcome: Progressing  10/24/2023 0449 by Jelly Valencia RN  Outcome: Progressing

## 2023-10-24 NOTE — PROGRESS NOTES
[facility] Department   Phone: [phone]    Physical Therapy    [x] Re-Evaluation            [] Daily Treatment Note         [] Discharge Summary      Patient: Nik Reese   : 1965   MRN: [id number]   Date of Service:  10/24/2023  Admitting Diagnosis: Left foot infection  Current Admission Summary: per H&P 10/16: \"Te Juan is a 66 y.o. male who presented with presents to ER with complaints of left foot pain. Patient with known history of diabetic wound and therefore he has been going to wound care center. Patient said on Friday or Wednesday of wound was very itchy could not walk on Friday. Last night patient took up small and noted some foul-smelling with fluid coming out from the wound. Patient came to ER for further evaluation subjective chills no fever denies any nausea vomiting. Nothing that makes it better or worse\"    10/17 - I&D of (L) foot, heel WBAT in surgical shoe after (Will need to return to OR for closure)    10/23 -I&D of L foot, NWB. The pt has had chest pain and orthostatic hypotension. Past Medical History:  has a past medical history of Angina at rest, Cardiomyopathy [facility]), Carotid artery stenosis, CHF (congestive heart failure) ([address]), CKD (chronic kidney disease) stage 2, GFR 60-89 ml/min, Coronary artery disease, Diabetic peripheral neuropathy ([address]), Diastolic HF (heart failure) ([address]), Hyperlipidemia with target LDL less than 70, Hypertension goal BP (blood pressure) < 130/80, PVD (peripheral vascular disease) ([address]), Seizures ([address]), and Type 1 diabetes mellitus with chronic kidney disease ([address]). Past Surgical History:  has a past surgical history that includes Cardiac surgery (); Coronary artery bypass graft; Cardiac catheterization; hernia repair; Tonsillectomy; LAPAROSCOPY INSERTION PERITONEAL CATHETER (N/A, 2020); Carotid stent placement (Right); Bladder surgery (Left, 2023);  Foot Debridement (Left,

## 2023-10-24 NOTE — CARE COORDINATION
Discharge Planning:     (SMOOTH) spoke with PT/OT and they stated that the patient will require a rolling walker and a 20\" wheelchair upon d/c. The wheelchair would be temporary, looking at around 3 weeks. SMOOTH LVM with Nathaniel([phone]) with [employer] notifying him of the need. Electronically signed by ROBIN Foss on 10/24/2023 at 10:30 AM          UPDATE @ 21 [phone]    SMOOTH sent [employer] to pt's PA Daniela Arechiga to let her know a DME order for a 20\" wheelchair and a rolling walker, as well as [address] orders, are needed for this patient.          Electronically signed by ROBIN Foss on 10/24/2023 at 11:09 AM

## 2023-10-24 NOTE — PROGRESS NOTES
Assessment completed. VSS. Patient awake, alert, and in bed. Patient able to express needs. Medications given per MAR. IV fluids infusing per order. Patient receives peritoneal dialysis per order no complications noted  patient has insulin pump in place r/t  dx of DM. No complaints of pain or discomfort at this time. Safety precautions in place. Bed alarm on. Will continue to monitor.

## 2023-10-26 NOTE — PROGRESS NOTES
Physician Progress Note      PATIENT:               Claudette Lawrence  CSN #:                  [id number]  :                       1965  ADMIT DATE:       10/16/2023 9:02 AM  DISCH DATE:        10/24/2023 7:19 PM  RESPONDING  PROVIDER #:        Obdulio Murphy MD          QUERY TEXT:    Patient admitted with diabetic foot ulcer. Documentation reflects concern for   gas gangrene in ID notes. If possible, please document in the progress notes   and discharge summary if gas gangrene was: The medical record reflects the following:  Risk Factors: 57yo with DM  Clinical Indicators: Xray foot, Soft tissue gas, swelling and equivocal but   likely mild erosion along the  cortex of the base of the 5th metatarsal  Podiatry 10/16, \"The renny wound has significant fluctuance without evidence of   crepitus. \"  ID , Complicated left diabetic foot infection with concern for gas   gangrene--surgical cultures so far has only grown coagulase-negative Staph-,   surgical culture is also grown finegoldia magna-covered with doxycycline and   Augmentin, respectively    Treatment: Augmentin, Doxy, Vanc, debridement, bx  Options provided:  -- gas gangrene confirmed after study  -- gas gangrene ruled out after study  -- Other - I will add my own diagnosis  -- Disagree - Not applicable / Not valid  -- Disagree - Clinically unable to determine / Unknown  -- Refer to Clinical Documentation Reviewer    PROVIDER RESPONSE TEXT:    gas gangrene ruled out after study. Query created by:  Lavonne Scheuermann on 10/26/2023 8:52 AM      Electronically signed by:  Obdulio Murphy MD 10/26/2023 12:08 PM

## 2023-10-30 LAB
FUNGUS SPEC CULT: NORMAL
LOEFFLER MB STN SPEC: NORMAL

## 2023-11-03 ENCOUNTER — OFFICE VISIT (OUTPATIENT)
Dept: INTERNAL MEDICINE CLINIC | Age: 58
End: 2023-11-03
Payer: MEDICARE

## 2023-11-03 VITALS — TEMPERATURE: 97.8 F

## 2023-11-03 DIAGNOSIS — E10.65 POORLY CONTROLLED TYPE 1 DIABETES MELLITUS (HCC): ICD-10-CM

## 2023-11-03 DIAGNOSIS — Z48.89 POSTOPERATIVE VISIT: Primary | ICD-10-CM

## 2023-11-03 PROCEDURE — 99213 OFFICE O/P EST LOW 20 MIN: CPT

## 2023-11-05 NOTE — PROGRESS NOTES
Department of Podiatry  Resident Progress Note    Name: Shannon Delgado  Allergies: Iodides, Shell-fish derived products, and atorvastatin calcium    SUBJECTIVE  The patient is a 66 y.o. male who presents for first post-operative visit. Patient is s/p left foot I&D with graft application and delayed primary closure (DOS 10/23/23). Patient states that he has kept his dressing clean, dry, and intact since surgery. He states that he has been compliant with NWBing status to LLE. He states that he has a few more doses of his PO Augmentin and Doxy. He states that he is having minimal pain. He denies N/V/F/C/SOB/CP. Patient denies all other pedal complaints     Past Medical History:        Diagnosis Date    Angina at rest     Cardiomyopathy [facility])     Carotid artery stenosis 10/25/2016    SUZANNA stented with 8 x 30 mm non-tapered Xact stent    CHF (congestive heart failure) ([address]) 03/03/2015    EF 30%     CKD (chronic kidney disease) stage 2, GFR 60-89 ml/min     Coronary artery disease     sp CABG UC    Diabetic peripheral neuropathy (HCC)     Diastolic HF (heart failure) ([facility])     Hyperlipidemia with target LDL less than 70     Hypertension goal BP (blood pressure) < 130/80     PVD (peripheral vascular disease) ([facility])     Seizures ([address])     in childhood    Type 1 diabetes mellitus with chronic kidney disease ([facility])     c-peptide <0.1 in 2015       REVIEW OF SYSTEMS: A 12 point review of systems is unremarkable with the exception of the chief complaint. Patient specifically denies nausea, vomiting, fever, chills, shortness of breath, chest pain, abdominal pain, constipation, or difficulty urinating. OBJECTIVE  Patient presents with assistance of a wheel chair unaccompanied. Patient is AOx3 and NAD. VASCULAR: DP and PT pulses faintly palpable +1/4. CFT is brisk to the digits of the foot b/l.  Skin temperature is warm to cool from proximal to distal with mild focal calor noted to the lateral 5th metatarsal base of the left

## 2023-11-06 LAB
FUNGUS SPEC CULT: NORMAL
LOEFFLER MB STN SPEC: NORMAL

## 2023-11-08 NOTE — TELEPHONE ENCOUNTER
Received refill request for [facility] from [address] [place] [address].      Last OV: 09/12/2023    Next OV: 12/21/2023    Last Labs: 10/24/2023    Last Filled:  09/21/2023

## 2023-11-09 ENCOUNTER — TELEPHONE (OUTPATIENT)
Dept: CARDIOLOGY CLINIC | Age: 58
End: 2023-11-09

## 2023-11-09 RX ORDER — SACUBITRIL AND VALSARTAN 49; 51 MG/1; MG/1
1 TABLET, FILM COATED ORAL 2 TIMES DAILY
Qty: 180 TABLET | Refills: 3 | Status: SHIPPED | OUTPATIENT
Start: 2023-11-09

## 2023-11-09 NOTE — TELEPHONE ENCOUNTER
I spoke with pt, he stated that he was in the hospital for orthostatic hypotension. His BP was taken and it was 126/78. He took his meds and then about an hour later he went to go to the bathroom and was dizzy and lightheaded his reading was 79/55. Pt states since he talked to Adam Ac he has not really done much as she told him to wait until he heard from us. Current vitals  Sitting down: /69 HR 94   Standing up: /64 HR 99   He is currently not feeling 100% steady but also does not feel like he did earlier.

## 2023-11-09 NOTE — TELEPHONE ENCOUNTER
I spoke with patient and relayed message per NPTS: Have him hold his hydralazine. Pt verbalized understanding. I also called Rondall Burkitt and left her a message to relay the message per NPTS.

## 2023-11-09 NOTE — TELEPHONE ENCOUNTER
Sharmin Zaragoza called to inform the office that the Pt is having issues with his low bp:     79/55    11/09     11:30am    Pt is feeling nauseated very dizzy an hour after taking his morning meds. Please call to discuss what is needed. Please advise.   Thank you

## 2023-11-09 NOTE — TELEPHONE ENCOUNTER
I spoke with Uday Treviño, she stated she has not seen patient today, the patient called her this morning. He was having some orthostatic hypotension when he got up to go to the bathroom. 126/78 took meds and then went to go to the bathroom, it was BP was 79/65 lightheaded, dizzy. Seated 99/59. She is not currently with him. I told her I will call him to see what his current vitals and symptoms are.

## 2023-11-10 ENCOUNTER — OFFICE VISIT (OUTPATIENT)
Dept: INTERNAL MEDICINE CLINIC | Age: 58
End: 2023-11-10
Payer: MEDICARE

## 2023-11-10 VITALS — TEMPERATURE: 98 F

## 2023-11-10 DIAGNOSIS — L97.522 ULCERATED, FOOT, LEFT, WITH FAT LAYER EXPOSED (HCC): ICD-10-CM

## 2023-11-10 DIAGNOSIS — Z48.89 POSTOPERATIVE VISIT: ICD-10-CM

## 2023-11-10 DIAGNOSIS — E10.65 POORLY CONTROLLED TYPE 1 DIABETES MELLITUS (HCC): Primary | ICD-10-CM

## 2023-11-10 PROCEDURE — 11042 DBRDMT SUBQ TIS 1ST 20SQCM/<: CPT

## 2023-11-10 PROCEDURE — 99213 OFFICE O/P EST LOW 20 MIN: CPT | Performed by: STUDENT IN AN ORGANIZED HEALTH CARE EDUCATION/TRAINING PROGRAM

## 2023-11-11 NOTE — PROGRESS NOTES
assessment and plan with Dr. Kathy Jenkins, MOOKIE SwanM  Podiatric Resident, PGY-3  Pager #: [phone] or Perfect Serve

## 2023-11-13 LAB
FUNGUS SPEC CULT: NORMAL
LOEFFLER MB STN SPEC: NORMAL

## 2023-11-17 ENCOUNTER — OFFICE VISIT (OUTPATIENT)
Dept: INTERNAL MEDICINE CLINIC | Age: 58
End: 2023-11-17
Payer: MEDICARE

## 2023-11-17 VITALS — TEMPERATURE: 97.4 F

## 2023-11-17 DIAGNOSIS — M14.672 CHARCOT'S JOINT OF LEFT FOOT: ICD-10-CM

## 2023-11-17 DIAGNOSIS — L97.522 FOOT ULCERATION, LEFT, WITH FAT LAYER EXPOSED (HCC): ICD-10-CM

## 2023-11-17 DIAGNOSIS — Z48.89 POSTOPERATIVE VISIT: Primary | ICD-10-CM

## 2023-11-17 DIAGNOSIS — E10.65 POORLY CONTROLLED TYPE 1 DIABETES MELLITUS (HCC): ICD-10-CM

## 2023-11-17 PROCEDURE — 11042 DBRDMT SUBQ TIS 1ST 20SQCM/<: CPT

## 2023-11-17 PROCEDURE — 99213 OFFICE O/P EST LOW 20 MIN: CPT

## 2023-11-17 NOTE — PROGRESS NOTES
Department of Podiatry  Resident Progress Note    Name: Bina Cruz  Allergies: Iodides, Shell-fish derived products, and atorvastatin calcium    SUBJECTIVE  The patient is a 66 y.o. male who presents for his third post-operative visit. Patient is s/p left foot I&D with graft application and delayed primary closure (DOS 10/23/23). Patient states that he has been doing great since his last appointment. He states that he does have some minimal pain and rates it a 2/10. He notes that he has a home health nurse coming once a week to check his dressing. Patient additionally states that he has been compliant with the non-weightbearing status for the most part but did try walking on his foot once but when that caused him pain he stopped. Patient is inquiring today about getting some inserts or fillers made for his shoes once his wound is healed. Patient also states that he would like to transfer care here even once his wound has healed. He states that he is more comfortable here and would like to switch podiatrists to Dr. Olga Wells. Patient denies N/V/F/C/SOB/CP. Patient denies all other pedal complaints     Past Medical History:        Diagnosis Date    Angina at rest     Cardiomyopathy [facility])     Carotid artery stenosis 10/25/2016    SUZANNA stented with 8 x 30 mm non-tapered Xact stent    CHF (congestive heart failure) ([address]) 03/03/2015    EF 30%     CKD (chronic kidney disease) stage 2, GFR 60-89 ml/min     Coronary artery disease     sp CABG UC    Diabetic peripheral neuropathy (HCC)     Diastolic HF (heart failure) ([facility])     Hyperlipidemia with target LDL less than 70     Hypertension goal BP (blood pressure) < 130/80     PVD (peripheral vascular disease) ([facility])     Seizures ([address])     in childhood    Type 1 diabetes mellitus with chronic kidney disease (HCC)     c-peptide <0.1 in 2015       REVIEW OF SYSTEMS: A 12 point review of systems is unremarkable with the exception of the chief complaint.  Patient specifically denies

## 2023-11-18 ENCOUNTER — HOSPITAL ENCOUNTER (OUTPATIENT)
Age: 58
Setting detail: OBSERVATION
Discharge: HOME | DRG: 100 | End: 2023-11-24
Attending: EMERGENCY MEDICINE | Admitting: STUDENT IN AN ORGANIZED HEALTH CARE EDUCATION/TRAINING PROGRAM
Payer: MEDICARE

## 2023-11-18 ENCOUNTER — APPOINTMENT (OUTPATIENT)
Dept: CT IMAGING | Age: 58
DRG: 100 | End: 2023-11-18
Payer: MEDICARE

## 2023-11-18 ENCOUNTER — APPOINTMENT (OUTPATIENT)
Dept: GENERAL RADIOLOGY | Age: 58
DRG: 100 | End: 2023-11-18
Payer: MEDICARE

## 2023-11-18 DIAGNOSIS — R53.1 GENERALIZED WEAKNESS: ICD-10-CM

## 2023-11-18 DIAGNOSIS — R07.9 CHEST PAIN, UNSPECIFIED TYPE: Primary | ICD-10-CM

## 2023-11-18 LAB
ALBUMIN SERPL-MCNC: 3.5 G/DL (ref 3.4–5)
ALBUMIN/GLOB SERPL: 1.1 {RATIO} (ref 1.1–2.2)
ALP SERPL-CCNC: 98 U/L (ref 40–129)
ALT SERPL-CCNC: 10 U/L (ref 10–40)
ANION GAP SERPL CALCULATED.3IONS-SCNC: 13 MMOL/L (ref 3–16)
APTT BLD: 25.7 SEC (ref 22.7–35.9)
AST SERPL-CCNC: 12 U/L (ref 15–37)
BACTERIA URNS QL MICRO: NORMAL /HPF
BASE EXCESS BLDV CALC-SCNC: 3.3 MMOL/L (ref -3–3)
BASOPHILS # BLD: 0.1 K/UL (ref 0–0.2)
BASOPHILS NFR BLD: 2.3 %
BILIRUB SERPL-MCNC: 0.3 MG/DL (ref 0–1)
BILIRUB UR QL STRIP.AUTO: NEGATIVE
BUN SERPL-MCNC: 53 MG/DL (ref 7–20)
CALCIUM SERPL-MCNC: 8.6 MG/DL (ref 8.3–10.6)
CHLORIDE SERPL-SCNC: 99 MMOL/L (ref 99–110)
CLARITY UR: CLEAR
CO2 BLDV-SCNC: 66 MMOL/L
CO2 SERPL-SCNC: 25 MMOL/L (ref 21–32)
COHGB MFR BLDV: 2.9 % (ref 0–1.5)
COLOR UR: YELLOW
CREAT SERPL-MCNC: 7.5 MG/DL (ref 0.9–1.3)
DEPRECATED RDW RBC AUTO: 14.8 % (ref 12.4–15.4)
DO-HGB MFR BLDV: 4 %
EKG ATRIAL RATE: 79 BPM
EKG DIAGNOSIS: NORMAL
EKG P AXIS: 67 DEGREES
EKG P-R INTERVAL: 150 MS
EKG Q-T INTERVAL: 414 MS
EKG QRS DURATION: 96 MS
EKG QTC CALCULATION (BAZETT): 474 MS
EKG R AXIS: 97 DEGREES
EKG T AXIS: 37 DEGREES
EKG VENTRICULAR RATE: 79 BPM
EOSINOPHIL # BLD: 0.2 K/UL (ref 0–0.6)
EOSINOPHIL NFR BLD: 4.9 %
EPI CELLS #/AREA URNS AUTO: 0 /HPF (ref 0–5)
ETHANOLAMINE SERPL-MCNC: NORMAL MG/DL (ref 0–0.08)
GFR SERPLBLD CREATININE-BSD FMLA CKD-EPI: 8 ML/MIN/{1.73_M2}
GLUCOSE BLD-MCNC: 446 MG/DL (ref 70–99)
GLUCOSE BLD-MCNC: 449 MG/DL (ref 70–99)
GLUCOSE SERPL-MCNC: 392 MG/DL (ref 70–99)
GLUCOSE UR STRIP.AUTO-MCNC: >=1000 MG/DL
HCO3 BLDV-SCNC: 28.1 MMOL/L (ref 23–29)
HCT VFR BLD AUTO: 30.4 % (ref 40.5–52.5)
HGB BLD-MCNC: 9.6 G/DL (ref 13.5–17.5)
HGB UR QL STRIP.AUTO: NEGATIVE
HYALINE CASTS #/AREA URNS AUTO: 0 /LPF (ref 0–8)
INR PPP: 1.07 (ref 0.84–1.16)
KETONES UR STRIP.AUTO-MCNC: NEGATIVE MG/DL
LEUKOCYTE ESTERASE UR QL STRIP.AUTO: NEGATIVE
LYMPHOCYTES # BLD: 1.2 K/UL (ref 1–5.1)
LYMPHOCYTES NFR BLD: 26.1 %
MCH RBC QN AUTO: 28.9 PG (ref 26–34)
MCHC RBC AUTO-ENTMCNC: 31.7 G/DL (ref 31–36)
MCV RBC AUTO: 91.3 FL (ref 80–100)
METHGB MFR BLDV: 0.4 %
MONOCYTES # BLD: 0.7 K/UL (ref 0–1.3)
MONOCYTES NFR BLD: 14.6 %
NEUTROPHILS # BLD: 2.4 K/UL (ref 1.7–7.7)
NEUTROPHILS NFR BLD: 52.1 %
NITRITE UR QL STRIP.AUTO: NEGATIVE
NT-PROBNP SERPL-MCNC: 2115 PG/ML (ref 0–124)
O2 CT VFR BLDV CALC: 14 VOL %
O2 THERAPY: ABNORMAL
PCO2 BLDV: 42.8 MMHG (ref 40–50)
PERFORMED ON: ABNORMAL
PERFORMED ON: ABNORMAL
PH BLDV: 7.43 [PH] (ref 7.35–7.45)
PH UR STRIP.AUTO: 8 [PH] (ref 5–8)
PLATELET # BLD AUTO: 130 K/UL (ref 135–450)
PMV BLD AUTO: 11 FL (ref 5–10.5)
PO2 BLDV: 80.4 MMHG (ref 25–40)
POTASSIUM SERPL-SCNC: 5.7 MMOL/L (ref 3.5–5.1)
PROT SERPL-MCNC: 6.6 G/DL (ref 6.4–8.2)
PROT UR STRIP.AUTO-MCNC: ABNORMAL MG/DL
PROTHROMBIN TIME: 13.9 SEC (ref 11.5–14.8)
RBC # BLD AUTO: 3.32 M/UL (ref 4.2–5.9)
RBC CLUMPS #/AREA URNS AUTO: 0 /HPF (ref 0–4)
SAO2 % BLDV: 96 %
SODIUM SERPL-SCNC: 137 MMOL/L (ref 136–145)
SP GR UR STRIP.AUTO: 1.01 (ref 1–1.03)
TROPONIN, HIGH SENSITIVITY: 101 NG/L (ref 0–22)
TROPONIN, HIGH SENSITIVITY: 95 NG/L (ref 0–22)
UA COMPLETE W REFLEX CULTURE PNL UR: ABNORMAL
UA DIPSTICK W REFLEX MICRO PNL UR: YES
URN SPEC COLLECT METH UR: ABNORMAL
UROBILINOGEN UR STRIP-ACNC: 0.2 E.U./DL
WBC # BLD AUTO: 4.6 K/UL (ref 4–11)
WBC #/AREA URNS AUTO: 3 /HPF (ref 0–5)

## 2023-11-18 PROCEDURE — 2580000003 HC RX 258: Performed by: PHYSICIAN ASSISTANT

## 2023-11-18 PROCEDURE — 93005 ELECTROCARDIOGRAM TRACING: CPT | Performed by: PHYSICIAN ASSISTANT

## 2023-11-18 PROCEDURE — G0378 HOSPITAL OBSERVATION PER HR: HCPCS

## 2023-11-18 PROCEDURE — 85610 PROTHROMBIN TIME: CPT

## 2023-11-18 PROCEDURE — 82803 BLOOD GASES ANY COMBINATION: CPT

## 2023-11-18 PROCEDURE — 84484 ASSAY OF TROPONIN QUANT: CPT

## 2023-11-18 PROCEDURE — 99285 EMERGENCY DEPT VISIT HI MDM: CPT

## 2023-11-18 PROCEDURE — 83880 ASSAY OF NATRIURETIC PEPTIDE: CPT

## 2023-11-18 PROCEDURE — 81001 URINALYSIS AUTO W/SCOPE: CPT

## 2023-11-18 PROCEDURE — 70450 CT HEAD/BRAIN W/O DYE: CPT

## 2023-11-18 PROCEDURE — 85025 COMPLETE CBC W/AUTO DIFF WBC: CPT

## 2023-11-18 PROCEDURE — 82077 ASSAY SPEC XCP UR&BREATH IA: CPT

## 2023-11-18 PROCEDURE — 85730 THROMBOPLASTIN TIME PARTIAL: CPT

## 2023-11-18 PROCEDURE — 36415 COLL VENOUS BLD VENIPUNCTURE: CPT

## 2023-11-18 PROCEDURE — 93010 ELECTROCARDIOGRAM REPORT: CPT | Performed by: INTERNAL MEDICINE

## 2023-11-18 PROCEDURE — 96361 HYDRATE IV INFUSION ADD-ON: CPT

## 2023-11-18 PROCEDURE — 80053 COMPREHEN METABOLIC PANEL: CPT

## 2023-11-18 PROCEDURE — 89051 BODY FLUID CELL COUNT: CPT

## 2023-11-18 PROCEDURE — 71045 X-RAY EXAM CHEST 1 VIEW: CPT

## 2023-11-18 PROCEDURE — 6370000000 HC RX 637 (ALT 250 FOR IP): Performed by: EMERGENCY MEDICINE

## 2023-11-18 RX ORDER — SODIUM CHLORIDE 0.9 % (FLUSH) 0.9 %
5-40 SYRINGE (ML) INJECTION EVERY 12 HOURS SCHEDULED
Status: CANCELLED | OUTPATIENT
Start: 2023-11-18

## 2023-11-18 RX ORDER — SODIUM CHLORIDE 0.9 % (FLUSH) 0.9 %
5-40 SYRINGE (ML) INJECTION PRN
Status: DISCONTINUED | OUTPATIENT
Start: 2023-11-18 | End: 2023-11-24 | Stop reason: HOSPADM

## 2023-11-18 RX ORDER — CARVEDILOL 25 MG/1
25 TABLET ORAL 2 TIMES DAILY
Status: DISCONTINUED | OUTPATIENT
Start: 2023-11-18 | End: 2023-11-24 | Stop reason: HOSPADM

## 2023-11-18 RX ORDER — ACETAMINOPHEN 325 MG/1
650 TABLET ORAL EVERY 6 HOURS PRN
Status: DISCONTINUED | OUTPATIENT
Start: 2023-11-18 | End: 2023-11-24 | Stop reason: HOSPADM

## 2023-11-18 RX ORDER — SODIUM CHLORIDE 9 MG/ML
INJECTION, SOLUTION INTRAVENOUS PRN
Status: DISCONTINUED | OUTPATIENT
Start: 2023-11-18 | End: 2023-11-24 | Stop reason: HOSPADM

## 2023-11-18 RX ORDER — DEXTROSE MONOHYDRATE 100 MG/ML
INJECTION, SOLUTION INTRAVENOUS CONTINUOUS PRN
Status: DISCONTINUED | OUTPATIENT
Start: 2023-11-18 | End: 2023-11-24 | Stop reason: HOSPADM

## 2023-11-18 RX ORDER — SENNOSIDES A AND B 8.6 MG/1
1 TABLET, FILM COATED ORAL DAILY PRN
Status: DISCONTINUED | OUTPATIENT
Start: 2023-11-18 | End: 2023-11-24 | Stop reason: HOSPADM

## 2023-11-18 RX ORDER — FUROSEMIDE 40 MG/1
40 TABLET ORAL 2 TIMES DAILY
Status: DISCONTINUED | OUTPATIENT
Start: 2023-11-19 | End: 2023-11-24 | Stop reason: HOSPADM

## 2023-11-18 RX ORDER — 0.9 % SODIUM CHLORIDE 0.9 %
1000 INTRAVENOUS SOLUTION INTRAVENOUS ONCE
Status: COMPLETED | OUTPATIENT
Start: 2023-11-18 | End: 2023-11-18

## 2023-11-18 RX ORDER — HEPARIN SODIUM 5000 [USP'U]/ML
5000 INJECTION, SOLUTION INTRAVENOUS; SUBCUTANEOUS EVERY 8 HOURS SCHEDULED
Status: DISCONTINUED | OUTPATIENT
Start: 2023-11-19 | End: 2023-11-24 | Stop reason: HOSPADM

## 2023-11-18 RX ORDER — EZETIMIBE 10 MG/1
10 TABLET ORAL DAILY
Status: DISCONTINUED | OUTPATIENT
Start: 2023-11-19 | End: 2023-11-18

## 2023-11-18 RX ORDER — SODIUM CHLORIDE, SODIUM LACTATE, CALCIUM CHLORIDE, MAGNESIUM CHLORIDE AND DEXTROSE 1.5; 538; 448; 18.3; 5.08 G/100ML; MG/100ML; MG/100ML; MG/100ML; MG/100ML
2000 INJECTION, SOLUTION INTRAPERITONEAL EVERY 4 HOURS
Status: DISPENSED | OUTPATIENT
Start: 2023-11-19 | End: 2023-11-20

## 2023-11-18 RX ORDER — RANOLAZINE 500 MG/1
500 TABLET, EXTENDED RELEASE ORAL 2 TIMES DAILY
Status: DISCONTINUED | OUTPATIENT
Start: 2023-11-18 | End: 2023-11-24 | Stop reason: HOSPADM

## 2023-11-18 RX ORDER — SODIUM CHLORIDE 9 MG/ML
INJECTION, SOLUTION INTRAVENOUS PRN
Status: CANCELLED | OUTPATIENT
Start: 2023-11-18

## 2023-11-18 RX ORDER — ISOSORBIDE MONONITRATE 30 MG/1
30 TABLET, EXTENDED RELEASE ORAL DAILY
Status: DISCONTINUED | OUTPATIENT
Start: 2023-11-19 | End: 2023-11-24 | Stop reason: HOSPADM

## 2023-11-18 RX ORDER — ACETAMINOPHEN 650 MG/1
650 SUPPOSITORY RECTAL EVERY 6 HOURS PRN
Status: CANCELLED | OUTPATIENT
Start: 2023-11-18

## 2023-11-18 RX ORDER — ACETAMINOPHEN 325 MG/1
650 TABLET ORAL EVERY 6 HOURS PRN
Status: CANCELLED | OUTPATIENT
Start: 2023-11-18

## 2023-11-18 RX ORDER — LANOLIN ALCOHOL/MO/W.PET/CERES
1000 CREAM (GRAM) TOPICAL DAILY
Status: DISCONTINUED | OUTPATIENT
Start: 2023-11-19 | End: 2023-11-24 | Stop reason: HOSPADM

## 2023-11-18 RX ORDER — ONDANSETRON 2 MG/ML
4 INJECTION INTRAMUSCULAR; INTRAVENOUS EVERY 6 HOURS PRN
Status: DISCONTINUED | OUTPATIENT
Start: 2023-11-18 | End: 2023-11-24 | Stop reason: HOSPADM

## 2023-11-18 RX ORDER — SODIUM CHLORIDE 0.9 % (FLUSH) 0.9 %
5-40 SYRINGE (ML) INJECTION PRN
Status: CANCELLED | OUTPATIENT
Start: 2023-11-18

## 2023-11-18 RX ORDER — ASPIRIN 81 MG/1
81 TABLET ORAL DAILY
Status: DISCONTINUED | OUTPATIENT
Start: 2023-11-19 | End: 2023-11-24 | Stop reason: HOSPADM

## 2023-11-18 RX ORDER — CALCITRIOL 0.25 UG/1
0.25 CAPSULE, LIQUID FILLED ORAL
Status: DISCONTINUED | OUTPATIENT
Start: 2023-11-19 | End: 2023-11-24 | Stop reason: HOSPADM

## 2023-11-18 RX ORDER — ASPIRIN 81 MG/1
324 TABLET, CHEWABLE ORAL ONCE
Status: COMPLETED | OUTPATIENT
Start: 2023-11-18 | End: 2023-11-18

## 2023-11-18 RX ORDER — GLUCAGON 1 MG/ML
1 KIT INJECTION PRN
Status: DISCONTINUED | OUTPATIENT
Start: 2023-11-18 | End: 2023-11-24 | Stop reason: HOSPADM

## 2023-11-18 RX ORDER — SODIUM CHLORIDE 0.9 % (FLUSH) 0.9 %
5-40 SYRINGE (ML) INJECTION EVERY 12 HOURS SCHEDULED
Status: DISCONTINUED | OUTPATIENT
Start: 2023-11-18 | End: 2023-11-24 | Stop reason: HOSPADM

## 2023-11-18 RX ORDER — DEXTROSE MONOHYDRATE 100 MG/ML
INJECTION, SOLUTION INTRAVENOUS CONTINUOUS PRN
Status: CANCELLED | OUTPATIENT
Start: 2023-11-18

## 2023-11-18 RX ORDER — METOCLOPRAMIDE 10 MG/1
5 TABLET ORAL 3 TIMES DAILY PRN
Status: DISCONTINUED | OUTPATIENT
Start: 2023-11-18 | End: 2023-11-24 | Stop reason: HOSPADM

## 2023-11-18 RX ORDER — CLOPIDOGREL BISULFATE 75 MG/1
75 TABLET ORAL DAILY
Status: DISCONTINUED | OUTPATIENT
Start: 2023-11-19 | End: 2023-11-24 | Stop reason: HOSPADM

## 2023-11-18 RX ORDER — LACTULOSE 10 G/15ML
30 SOLUTION ORAL 3 TIMES DAILY PRN
Status: DISCONTINUED | OUTPATIENT
Start: 2023-11-18 | End: 2023-11-24 | Stop reason: HOSPADM

## 2023-11-18 RX ORDER — ROSUVASTATIN CALCIUM 40 MG/1
40 TABLET, COATED ORAL NIGHTLY
Status: DISCONTINUED | OUTPATIENT
Start: 2023-11-18 | End: 2023-11-24 | Stop reason: HOSPADM

## 2023-11-18 RX ORDER — ONDANSETRON 2 MG/ML
4 INJECTION INTRAMUSCULAR; INTRAVENOUS EVERY 6 HOURS PRN
Status: CANCELLED | OUTPATIENT
Start: 2023-11-18

## 2023-11-18 RX ADMIN — NITROGLYCERIN 0.5 INCH: 20 OINTMENT TOPICAL at 18:00

## 2023-11-18 RX ADMIN — SODIUM CHLORIDE 1000 ML: 9 INJECTION, SOLUTION INTRAVENOUS at 17:30

## 2023-11-18 RX ADMIN — ASPIRIN 324 MG: 81 TABLET, CHEWABLE ORAL at 18:00

## 2023-11-18 ASSESSMENT — ENCOUNTER SYMPTOMS
COUGH: 0
BACK PAIN: 0
VOMITING: 0
CONSTIPATION: 0
NAUSEA: 0
RESPIRATORY NEGATIVE: 1
COLOR CHANGE: 0
ABDOMINAL PAIN: 0
SHORTNESS OF BREATH: 0
DIARRHEA: 0
CHEST TIGHTNESS: 0

## 2023-11-18 ASSESSMENT — PAIN SCALES - GENERAL
PAINLEVEL_OUTOF10: 3
PAINLEVEL_OUTOF10: 0

## 2023-11-18 NOTE — ED PROVIDER NOTES
tel. [phone],  Chemistry results called to and read back by Edwin Gonsales RN, 11/18/2023  17:42, by [id number] - Abnormal; Notable for the following components:    Pro-BNP 2,115 (*)     All other components within normal limits    Narrative:     Natasha Montes  [facility] tel. [phone],  Chemistry results called to and read back by Edwin Gonsales RN, 11/18/2023  17:42, by [address] - Abnormal; Notable for the following components:    Glucose, Ur >=1000 (*)     Protein, UA TRACE (*)     All other components within normal limits   BLOOD GAS, VENOUS - Abnormal; Notable for the following components:    pO2, Andrez 80.4 (*)     Base Excess, Andrez 3.3 (*)     Carboxyhemoglobin 2.9 (*)     All other components within normal limits   TROPONIN - Abnormal; Notable for the following components:    Troponin, High Sensitivity 95 (*)     All other components within normal limits   CBC WITH AUTO DIFFERENTIAL - Abnormal; Notable for the following components:    RBC 3.32 (*)     Hemoglobin 9.7 (*)     Hematocrit 30.2 (*)     Platelets 364 (*)     All other components within normal limits   TROPONIN - Abnormal; Notable for the following components:    Troponin, High Sensitivity 92 (*)     All other components within normal limits   RENAL FUNCTION PANEL - Abnormal; Notable for the following components:    Glucose 185 (*)     BUN 50 (*)     Creatinine 7.3 (*)     Est, Glom Filt Rate 8 (*)     Calcium 8.2 (*)     Albumin 3.0 (*)     All other components within normal limits    Narrative:     CALL  Smith  [facility] tel. [phone],  Previous panic on this admission - call not needed per SOP, 11/19/2023 06:46,  by CASMI   POCT GLUCOSE - Abnormal; Notable for the following components:    POC Glucose 449 (*)     All other components within normal limits   POCT GLUCOSE - Abnormal; Notable for the following components:    POC Glucose 446 (*)     All other components within normal limits   POCT GLUCOSE - (CYANOCOBALAMIN) tablet 1,000 mcg (has no administration in time range)   aspirin EC tablet 81 mg (has no administration in time range)   calcitRIOL (ROCALTROL) capsule 0.25 mcg (has no administration in time range)   clopidogrel (PLAVIX) tablet 75 mg (has no administration in time range)   metoclopramide (REGLAN) tablet 5 mg (has no administration in time range)   isosorbide mononitrate (IMDUR) extended release tablet 30 mg (has no administration in time range)   ranolazine (RANEXA) extended release tablet 500 mg (500 mg Oral Given 11/19/23 0041)   carvedilol (COREG) tablet 25 mg (25 mg Oral Given 11/19/23 0041)   furosemide (LASIX) tablet 40 mg (has no administration in time range)   sacubitril-valsartan (ENTRESTO) 49-51 MG per tablet 1 tablet (1 tablet Oral Given 11/19/23 0041)   acetaminophen (TYLENOL) tablet 650 mg (has no administration in time range)   lactulose (CHRONULAC) 10 GM/15ML solution 20 g (has no administration in time range)   rosuvastatin (CRESTOR) tablet 40 mg (40 mg Oral Given 11/19/23 0041)   Insulin Pump - Bolus Dose  (Patient Supplied) (has no administration in time range)   Insulin Pump - Basal Dose  (Patient Supplied) (has no administration in time range)   dextrose bolus 10% 125 mL (has no administration in time range)     Or   dextrose bolus 10% 250 mL (has no administration in time range)   glucagon injection 1 mg (has no administration in time range)   dextrose 10 % infusion (has no administration in time range)   ondansetron (ZOFRAN) injection 4 mg (has no administration in time range)   dianeal lo-miller 1.5% 2,000 mL (2,000 mLs IntraPERitoneal New Bag 11/19/23 0600)   sodium chloride 0.9 % bolus 1,000 mL (0 mLs IntraVENous Stopped 11/18/23 2046)   aspirin chewable tablet 324 mg (324 mg Oral Given 11/18/23 1800)   nitroglycerin (NITRO-BID) 2 % ointment 0.5 inch (0.5 inches Topical Given 11/18/23 1800)             Is this patient to be included in the SEP-1 Core Measure due to severe

## 2023-11-19 LAB
ALBUMIN SERPL-MCNC: 3 G/DL (ref 3.4–5)
ANION GAP SERPL CALCULATED.3IONS-SCNC: 14 MMOL/L (ref 3–16)
APPEARANCE FLUID: CLEAR
APPEARANCE FLUID: CLEAR
BASOPHILS # BLD: 0 K/UL (ref 0–0.2)
BASOPHILS NFR BLD: 0.5 %
BDY FLUID QUALITY: NORMAL
BDY FLUID QUALITY: NORMAL
BUN SERPL-MCNC: 50 MG/DL (ref 7–20)
CALCIUM SERPL-MCNC: 8.2 MG/DL (ref 8.3–10.6)
CELL COUNT FLUID TYPE: NORMAL
CELL COUNT FLUID TYPE: NORMAL
CHLORIDE SERPL-SCNC: 106 MMOL/L (ref 99–110)
CO2 SERPL-SCNC: 22 MMOL/L (ref 21–32)
COLOR FLUID: NORMAL
COLOR FLUID: NORMAL
CREAT SERPL-MCNC: 7.3 MG/DL (ref 0.9–1.3)
DEPRECATED RDW RBC AUTO: 15.4 % (ref 12.4–15.4)
DIFF PNL FLD: NORMAL
EKG ATRIAL RATE: 89 BPM
EKG DIAGNOSIS: NORMAL
EKG P AXIS: 20 DEGREES
EKG P-R INTERVAL: 142 MS
EKG Q-T INTERVAL: 392 MS
EKG QRS DURATION: 102 MS
EKG QTC CALCULATION (BAZETT): 476 MS
EKG R AXIS: 50 DEGREES
EKG T AXIS: 20 DEGREES
EKG VENTRICULAR RATE: 89 BPM
EOSINOPHIL # BLD: 0.3 K/UL (ref 0–0.6)
EOSINOPHIL NFR BLD: 5.5 %
GFR SERPLBLD CREATININE-BSD FMLA CKD-EPI: 8 ML/MIN/{1.73_M2}
GLUCOSE BLD-MCNC: 172 MG/DL (ref 70–99)
GLUCOSE BLD-MCNC: 182 MG/DL (ref 70–99)
GLUCOSE BLD-MCNC: 194 MG/DL (ref 70–99)
GLUCOSE BLD-MCNC: 206 MG/DL (ref 70–99)
GLUCOSE BLD-MCNC: 220 MG/DL (ref 70–99)
GLUCOSE BLD-MCNC: 243 MG/DL (ref 70–99)
GLUCOSE SERPL-MCNC: 185 MG/DL (ref 70–99)
HCT VFR BLD AUTO: 30.2 % (ref 40.5–52.5)
HGB BLD-MCNC: 9.7 G/DL (ref 13.5–17.5)
LYMPHOCYTES # BLD: 1.4 K/UL (ref 1–5.1)
LYMPHOCYTES NFR BLD: 30.1 %
LYMPHOCYTES NFR FLD: 32 %
LYMPHOCYTES NFR FLD: 66 %
MACROPHAGES # FLD: 30 %
MCH RBC QN AUTO: 29.2 PG (ref 26–34)
MCHC RBC AUTO-ENTMCNC: 32 G/DL (ref 31–36)
MCV RBC AUTO: 91.1 FL (ref 80–100)
MONOCYTES # BLD: 0.6 K/UL (ref 0–1.3)
MONOCYTES NFR BLD: 11.7 %
MONOCYTES NFR FLD: 3 %
NEUTROPHIL, FLUID: 4 %
NEUTROPHIL, FLUID: 65 %
NEUTROPHILS # BLD: 2.5 K/UL (ref 1.7–7.7)
NEUTROPHILS NFR BLD: 52.2 %
NUC CELL # FLD: 10 /CUMM
NUC CELL # FLD: 203 /CUMM
PERFORMED ON: ABNORMAL
PHOSPHATE SERPL-MCNC: 3.7 MG/DL (ref 2.5–4.9)
PLATELET # BLD AUTO: 122 K/UL (ref 135–450)
PMV BLD AUTO: 10.1 FL (ref 5–10.5)
POTASSIUM SERPL-SCNC: 4.8 MMOL/L (ref 3.5–5.1)
RBC # BLD AUTO: 3.32 M/UL (ref 4.2–5.9)
RBC FLUID: <1000 /CUMM
RBC FLUID: <1000 /CUMM
SODIUM SERPL-SCNC: 142 MMOL/L (ref 136–145)
TOTAL CELLS COUNTED FLD: 100
TOTAL CELLS COUNTED FLD: 50
TROPONIN, HIGH SENSITIVITY: 92 NG/L (ref 0–22)
WBC # BLD AUTO: 4.7 K/UL (ref 4–11)

## 2023-11-19 PROCEDURE — 84484 ASSAY OF TROPONIN QUANT: CPT

## 2023-11-19 PROCEDURE — 89051 BODY FLUID CELL COUNT: CPT

## 2023-11-19 PROCEDURE — 6360000002 HC RX W HCPCS: Performed by: PHYSICIAN ASSISTANT

## 2023-11-19 PROCEDURE — A4722 DIALYS SOL FLD VOL > 1999CC: HCPCS | Performed by: INTERNAL MEDICINE

## 2023-11-19 PROCEDURE — 99223 1ST HOSP IP/OBS HIGH 75: CPT | Performed by: INTERNAL MEDICINE

## 2023-11-19 PROCEDURE — 87070 CULTURE OTHR SPECIMN AEROBIC: CPT

## 2023-11-19 PROCEDURE — 80069 RENAL FUNCTION PANEL: CPT

## 2023-11-19 PROCEDURE — 5A1D70Z PERFORMANCE OF URINARY FILTRATION, INTERMITTENT, LESS THAN 6 HOURS PER DAY: ICD-10-PCS | Performed by: INTERNAL MEDICINE

## 2023-11-19 PROCEDURE — 6360000002 HC RX W HCPCS: Performed by: INTERNAL MEDICINE

## 2023-11-19 PROCEDURE — 90945 DIALYSIS ONE EVALUATION: CPT

## 2023-11-19 PROCEDURE — G0378 HOSPITAL OBSERVATION PER HR: HCPCS

## 2023-11-19 PROCEDURE — 85025 COMPLETE CBC W/AUTO DIFF WBC: CPT

## 2023-11-19 PROCEDURE — 36415 COLL VENOUS BLD VENIPUNCTURE: CPT

## 2023-11-19 PROCEDURE — 2580000003 HC RX 258: Performed by: PHYSICIAN ASSISTANT

## 2023-11-19 PROCEDURE — 87205 SMEAR GRAM STAIN: CPT

## 2023-11-19 PROCEDURE — 93010 ELECTROCARDIOGRAM REPORT: CPT | Performed by: INTERNAL MEDICINE

## 2023-11-19 PROCEDURE — 6370000000 HC RX 637 (ALT 250 FOR IP): Performed by: PHYSICIAN ASSISTANT

## 2023-11-19 PROCEDURE — 96372 THER/PROPH/DIAG INJ SC/IM: CPT

## 2023-11-19 RX ORDER — LORAZEPAM 2 MG/ML
4 INJECTION INTRAMUSCULAR EVERY 5 MIN PRN
Status: DISCONTINUED | OUTPATIENT
Start: 2023-11-19 | End: 2023-11-24 | Stop reason: HOSPADM

## 2023-11-19 RX ADMIN — SACUBITRIL AND VALSARTAN 1 TABLET: 49; 51 TABLET, FILM COATED ORAL at 00:41

## 2023-11-19 RX ADMIN — HEPARIN SODIUM 5000 UNITS: 5000 INJECTION INTRAVENOUS; SUBCUTANEOUS at 21:06

## 2023-11-19 RX ADMIN — SODIUM CHLORIDE, SODIUM LACTATE, CALCIUM CHLORIDE, MAGNESIUM CHLORIDE AND DEXTROSE 2000 ML: 1.5; 538; 448; 18.3; 5.08 INJECTION, SOLUTION INTRAPERITONEAL at 12:17

## 2023-11-19 RX ADMIN — CARVEDILOL 25 MG: 25 TABLET, FILM COATED ORAL at 00:41

## 2023-11-19 RX ADMIN — ASPIRIN 81 MG: 81 TABLET, COATED ORAL at 10:09

## 2023-11-19 RX ADMIN — Medication 10 ML: at 10:09

## 2023-11-19 RX ADMIN — RANOLAZINE 500 MG: 500 TABLET, EXTENDED RELEASE ORAL at 21:07

## 2023-11-19 RX ADMIN — SODIUM CHLORIDE, SODIUM LACTATE, CALCIUM CHLORIDE, MAGNESIUM CHLORIDE AND DEXTROSE 2000 ML: 1.5; 538; 448; 18.3; 5.08 INJECTION, SOLUTION INTRAPERITONEAL at 21:34

## 2023-11-19 RX ADMIN — SODIUM CHLORIDE, SODIUM LACTATE, CALCIUM CHLORIDE, MAGNESIUM CHLORIDE AND DEXTROSE 2000 ML: 1.5; 538; 448; 18.3; 5.08 INJECTION, SOLUTION INTRAPERITONEAL at 06:00

## 2023-11-19 RX ADMIN — Medication 10 ML: at 05:58

## 2023-11-19 RX ADMIN — SACUBITRIL AND VALSARTAN 1 TABLET: 49; 51 TABLET, FILM COATED ORAL at 10:09

## 2023-11-19 RX ADMIN — ROSUVASTATIN CALCIUM 40 MG: 40 TABLET, COATED ORAL at 00:41

## 2023-11-19 RX ADMIN — CLOPIDOGREL BISULFATE 75 MG: 75 TABLET ORAL at 10:09

## 2023-11-19 RX ADMIN — FUROSEMIDE 40 MG: 40 TABLET ORAL at 10:10

## 2023-11-19 RX ADMIN — ACETAMINOPHEN 650 MG: 325 TABLET ORAL at 22:48

## 2023-11-19 RX ADMIN — SACUBITRIL AND VALSARTAN 1 TABLET: 49; 51 TABLET, FILM COATED ORAL at 21:07

## 2023-11-19 RX ADMIN — HEPARIN SODIUM 5000 UNITS: 5000 INJECTION INTRAVENOUS; SUBCUTANEOUS at 15:29

## 2023-11-19 RX ADMIN — ISOSORBIDE MONONITRATE 30 MG: 30 TABLET, EXTENDED RELEASE ORAL at 10:09

## 2023-11-19 RX ADMIN — CARVEDILOL 25 MG: 25 TABLET, FILM COATED ORAL at 10:08

## 2023-11-19 RX ADMIN — RANOLAZINE 500 MG: 500 TABLET, EXTENDED RELEASE ORAL at 00:41

## 2023-11-19 RX ADMIN — FUROSEMIDE 40 MG: 40 TABLET ORAL at 17:18

## 2023-11-19 RX ADMIN — Medication 10 ML: at 21:07

## 2023-11-19 RX ADMIN — HEPARIN SODIUM 5000 UNITS: 5000 INJECTION INTRAVENOUS; SUBCUTANEOUS at 05:59

## 2023-11-19 RX ADMIN — CARVEDILOL 25 MG: 25 TABLET, FILM COATED ORAL at 21:07

## 2023-11-19 RX ADMIN — RANOLAZINE 500 MG: 500 TABLET, EXTENDED RELEASE ORAL at 10:09

## 2023-11-19 RX ADMIN — CALCITRIOL CAPSULES 0.25 MCG 0.25 MCG: 0.25 CAPSULE ORAL at 10:09

## 2023-11-19 RX ADMIN — CYANOCOBALAMIN TAB 1000 MCG 1000 MCG: 1000 TAB at 10:09

## 2023-11-19 RX ADMIN — ROSUVASTATIN CALCIUM 40 MG: 40 TABLET, COATED ORAL at 21:07

## 2023-11-19 RX ADMIN — SODIUM CHLORIDE, SODIUM LACTATE, CALCIUM CHLORIDE, MAGNESIUM CHLORIDE AND DEXTROSE 2000 ML: 1.5; 538; 448; 18.3; 5.08 INJECTION, SOLUTION INTRAPERITONEAL at 01:25

## 2023-11-19 ASSESSMENT — PAIN SCALES - GENERAL
PAINLEVEL_OUTOF10: 0

## 2023-11-19 ASSESSMENT — LIFESTYLE VARIABLES
HOW MANY STANDARD DRINKS CONTAINING ALCOHOL DO YOU HAVE ON A TYPICAL DAY: 1 OR 2
HOW OFTEN DO YOU HAVE A DRINK CONTAINING ALCOHOL: MONTHLY OR LESS

## 2023-11-19 NOTE — CONSULTS
Cardiology consult        Patient seen and examined, chart reviewed    Full note to follow             Patient admitted primarily for orthostatic hypotension/presyncope  Has known CAD s/p CABG 2001, s/p PCI 2021  Has occasional chest pain that felt similar to what he had prior to last PCI, causing trouble with exertional effort.     Last LVEF 35% 6/2023     Follows with Dr. Homero Modi  Trop elevated, but given ESRD only mild elevation, and downtrending     NPO at midnight  Will defer to Dr. Homero Modi whether to do stress of cath tomorrow

## 2023-11-19 NOTE — ED PROVIDER NOTES
This patient was seen by the Mid-Level Provider. I have seen and examined the patient, agree with the workup, evaluation, management and diagnosis. Care plan has been discussed. My assessment reveals a 56-year-old male with a history of multiple medical problems who presents after feeling lightheaded and like he was going to pass out. The patient was apparently watching a football game when he stood up he became very lightheaded and felt like he was going to pass out. He did not pass out. He states he almost lost consciousness. In addition, the patient stated that his blood sugar was elevated. He has an insulin pump in place. After the patient arrived he started complaining of some chest pain as well. Radiology results:    CT HEAD WO CONTRAST   Final Result   Mild atrophy and mild chronic microischemic changes scattered in the deep   white matter which is unchanged with no acute intracranial abnormality. Moderate chronic pansinusitis which is unchanged. XR CHEST PORTABLE   Final Result   No acute process.                LABS:    Labs Reviewed   COMPREHENSIVE METABOLIC PANEL W/ REFLEX TO MG FOR LOW K - Abnormal; Notable for the following components:       Result Value    Potassium reflex Magnesium 5.7 (*)     Glucose 392 (*)     BUN 53 (*)     Creatinine 7.5 (*)     Est, Glom Filt Rate 8 (*)     AST 12 (*)     All other components within normal limits    Narrative:     CALL  [facility] tel. [phone],  Chemistry results called to and read back by Stefanie Culver RN, 11/18/2023  17:42, by PAT   TROPONIN - Abnormal; Notable for the following components:    Troponin, High Sensitivity 101 (*)     All other components within normal limits    Narrative:     CALL  [facility] tel. [phone],  Chemistry results called to and read back by Stefanie Culver RN, 11/18/2023  17:42, by [id number] - Abnormal; Notable for the following components:    Pro-BNP 2,115 (*)     All other

## 2023-11-19 NOTE — ED NOTES
Face to face handoff report to [facility] [place] [place] by [facility].       Ana Elizabeth RN  11/18/23 1707

## 2023-11-19 NOTE — PROGRESS NOTES
Date of Admission: 11/18/2023. Hospital Day: 2       Assessment/Plan:     66 y.o. male who presented to ED for evaluation after a presyncopal episode. Patient was watching a football game when he stood up and became very lightheaded and like he was going to pass out. He did not actually lose consciousness. Patient also developed nonradiating, substernal chest pain/pressure after arrival to ED. Active Hospital Problems    Diagnosis     Chest pain [R07.9]    Chest pain. CAD s/p CABG (2001) and PTCA SVG > OM-1 (2021)  EKG shows NSR, rate 89, no evidence of acute ischemia or infarction. Troponins 101 > 95, stable, chronically elevated in the setting of renal failure  Continue home ASA, Plavix, BB, statin, Imdur, Ranexa  NTG for chest pain PRN  Monitor on telemetry  Had stress test 10/2023 with findings of  moderate size  defect in inferolat wall with minimal reversibility  Cardiology consulted,       Dizziness  Obtain orthostatic vitals      Type 1 DM with gastroparesis  Last HbA1c was 9.0  Continue home insulin pump and Reglan  Accuchecks, SSI  Hypoglycemia protocol       ESRD on PD  Renally dose medications  Monitor for electrolyte abnormalities  Nephrology consulted     Chronic combined systolic and diastolic CHF  Last ECHO on 6/7/23 noted EF of 35%  ProBNP 2115  Continue home lasix, BB, Entresto  Daily weights, I/Os      Carotid stenosis  S/p CEA (2016) and SUZANNA stent (2022)  Continue home aspirin, Plavix, statin         DVT prophylaxis: SQ heparin  Probiotic if on abx: N/A           DVT Prophylaxis:    Diet: ADULT DIET; Regular; 4 carb choices (60 gm/meal); Low Sodium (2 gm); Low Potassium (Less than 3000 mg/day); Low Phosphorus (Less than 1000 mg)  Code Status: Full Code      Dispo - home    All  follow up labs and imaging personally reviewed      Chief Complaint:   Chief Complaint   Patient presents with    drowsy     PT to ED via [facility] from c/o drowsiness and high blood sugar 385.  PT sts his

## 2023-11-19 NOTE — PROGRESS NOTES
Treatment initiated without complications or complaints from patient. Patient resting comfortably with VSS upon dialysis RN exit from room. ,  hotline number located on top of machine for any questions about troubleshooting during after hours.

## 2023-11-19 NOTE — ED NOTES
ED TO INPATIENT SBAR HANDOFF    Patient Name: Nader Perez   :  1965  66 y.o. MRN:  [id number]  Preferred Name  [address] [facility]  ED Room #:  ED-0006/06  Family/Caregiver Present yes   Restraints no   Sitter no   Sepsis Risk Score Sepsis Risk Score: 3.02    Situation  Code Status: Prior No additional code details. Allergies: Iodides, Shellfish-derived products, and Atorvastatin calcium  Weight: Patient Vitals for the past 96 hrs (Last 3 readings):   Weight   23 1608 88.5 kg (195 lb)     Arrived from: home  Chief Complaint:   Chief Complaint   Patient presents with    drowsy     PT to ED via [facility] from c/o drowsiness and high blood sugar 385. PT sts his blood sugar in the 150's range. PT last meal 1300 (lunch;today). PT sts \"I am not sure if I gave myself insulin bolus\". PT DM 1. PT gave himself 2.39 insulin during triage. Hospital Problem/Diagnosis:  Principal Problem:    Chest pain  Resolved Problems:    * No resolved hospital problems. *    Imaging:   CT HEAD WO CONTRAST   Final Result   Mild atrophy and mild chronic microischemic changes scattered in the deep   white matter which is unchanged with no acute intracranial abnormality. Moderate chronic pansinusitis which is unchanged. XR CHEST PORTABLE   Final Result   No acute process.            Abnormal labs:   Abnormal Labs Reviewed   COMPREHENSIVE METABOLIC PANEL W/ REFLEX TO MG FOR LOW K - Abnormal; Notable for the following components:       Result Value    Potassium reflex Magnesium 5.7 (*)     Glucose 392 (*)     BUN 53 (*)     Creatinine 7.5 (*)     Est, Glom Filt Rate 8 (*)     AST 12 (*)     All other components within normal limits    Narrative:     CALL  Smith  [facility] tel. [phone],  Chemistry results called to and read back by Aleksey Officer RN, 2023  17:42, by PAT   TROPONIN - Abnormal; Notable for the following components:    Troponin, High Sensitivity 101 (*)     All other components within normal limits

## 2023-11-19 NOTE — PROGRESS NOTES
Pharmacy Home Medication Reconciliation Note    A medication reconciliation has been completed for Genesis Cabral 1965    Pharmacy: [facility] [address]  Information provided by: patient and wife    The patient's home medication list is as follows: No current facility-administered medications on file prior to encounter. Current Outpatient Medications on File Prior to Encounter   Medication Sig Dispense Refill    ENTRESTO 49-51 MG per tablet TAKE 1 TABLET BY MOUTH TWICE  DAILY 180 tablet 3    carvedilol (COREG) 25 MG tablet Take 0.5 tablets by mouth 2 times daily (Patient taking differently: Take 1 tablet by mouth 2 times daily) 180 tablet 1    furosemide (LASIX) 40 MG tablet Take 1 tablet by mouth in the morning and 1 tablet in the evening.  60 tablet 3    insulin lispro (HUMALOG) 100 UNIT/ML SOLN injection vial Use 30-40 units daily via pump (Patient taking differently: 30-40 Units by Insulin Pump - SubCUTAneous Infusion route continuous) 20 mL 2    diclofenac sodium (VOLTAREN) 1 % GEL Apply 4 g topically 4 times daily as needed (Patient not taking: Reported on 11/18/2023)      lactulose (CHRONULAC) 10 GM/15ML solution Take 30 mLs by mouth 3 times daily as needed for Constipation      hydrALAZINE (APRESOLINE) 50 MG tablet Take 1 tablet by mouth 3 times daily (Patient not taking: Reported on 11/18/2023) 270 tablet 1    isosorbide mononitrate (IMDUR) 30 MG extended release tablet Take 1 tablet by mouth daily 90 tablet 2    ranolazine (RANEXA) 500 MG extended release tablet TAKE 1 TABLET BY MOUTH TWICE DAILY (Patient taking differently: Take 1 tablet by mouth 2 times daily TAKE 1 TABLET BY MOUTH TWICE DAILY) 180 tablet 2    metoclopramide (REGLAN) 5 MG tablet TAKE 1 TABLET BY MOUTH 3 TIMES  DAILY WITH EVERY MEAL AS  DIRECTED (Patient taking differently: Take 1 tablet by mouth 3 times daily as needed) 30 tablet 0    Continuous Blood Gluc Sensor (DEXCOM G6 SENSOR) MISC USE AS DIRECTED CHANGE

## 2023-11-19 NOTE — CONSULTS
[address]  [phone]      Chief Complaint   Patient presents with    drowsy     PT to ED via [facility] from c/o drowsiness and high blood sugar 385. PT sts his blood sugar in the 150's range. PT last meal 1300 (lunch;today). PT sts \"I am not sure if I gave myself insulin bolus\". PT DM 1. PT gave himself 2.39 insulin during triage. Reason for consult:  chest pain, elevated troponin    ASSESSMENT AND PLAN:  CAD in native artery with angina pectoris  Chest pain, atypical, possibly musculoskeletal, but with known CAD and several risk factors  ESRD on PD  S/p CABG 2001, s/p PCI 2021  Ischemic CM, last LVEF 35% in 2021  Orthostatic hypotension    Patient admitted primarily for orthostatic hypotension/presyncope  Has known CAD s/p CABG 2001, s/p PCI 2021  Has occasional chest pain that felt similar to what he had prior to last PCI, causing trouble with exertional effort. Last LVEF 35% 6/2023, LVEF 35-40% 2021     Follows with Dr. Lety Montesinos  Trop elevated, but given ESRD only mild elevation, and downtrending     NPO at midnight    Will defer to Dr. Lety Montesinos whether to do stress, viability study, CTA, or cath as next step, versus medical mangement. He just had a stress test in October that showed a lot of scar with minimal reversibility. Potential need for ICD versus additional medical therapy and re-evaluation of LVEF    History of Present Illness:  Kimberley Weiss is a 66 y.o. patient with CAD s/p CABG and PCI, ESRD on PD, diabetes, who presents with chest pain. Troponins mildly elevated ~100 but flat trend, and downtrending. He has had a lot of issues with presyncope and orthostatic hypotension. He is on GDMT for heart failure. Last LVEF was 35% and Dr. Lety Montesinos mentioned to patient recently that he might need an ICD.     Past Medical History:   has a past medical history of Angina at rest, Cardiomyopathy [facility]), Carotid artery stenosis, CHF (congestive heart

## 2023-11-19 NOTE — CONSULTS
[facility]   Nephrology progress Note  [phone]   [email or web address]           Reason for Consult:  ESRD on PD     HISTORY OF PRESENT ILLNESS:      The patient is a 66 y.o. male with significant past medical history of   ESRD on dialysis, coronary artery disease s/p CABG, carotid artery stenosis s/p stent right internal carotid artery, ischemic cardiomyopathy, hypertension, hyperlipidemia, nephrolithiasis  T1 diabetes diagnosed when he was 7years old who presents with lightheadedness . He experienced a near syncope, this happened he was watching a football game and when he stood up he became very lightheaded and felt he was going to pass out. There was no loss of consciousness. .  There was no seizure activity no tongue bite or no urinary or bladder incontinence. He also developed substernal chest pain which was nonradiating once he arrived in the ER. He does have a past history of coronary artery disease s/p CABG and PCI and stenting. He was noticed to have a very high blood sugar and was admitted. Last night as no PD nurse was available I could not use a cycler and put him on manual exchanges. His PD effluent is clear  No abdominal pain  He says he is on an IPD at home and does not do any exchanges during the day.   He says he has a large residual urine volume        Past Medical History:        Diagnosis Date    Angina at rest     Cardiomyopathy [facility])     Carotid artery stenosis 10/25/2016    SUZANNA stented with 8 x 30 mm non-tapered Xact stent    CHF (congestive heart failure) ([address] 03/03/2015    EF 30%     CKD (chronic kidney disease) stage 2, GFR 60-89 ml/min     Coronary artery disease     sp CABG UC    Diabetic peripheral neuropathy (HCC)     Diastolic HF (heart failure) (HCC)     Hyperlipidemia with target LDL less than 70     Hypertension goal BP (blood pressure) < 130/80     PVD (peripheral vascular disease) ([facility])     Seizures (HCC)     in childhood    Type 1

## 2023-11-20 ENCOUNTER — APPOINTMENT (OUTPATIENT)
Dept: MRI IMAGING | Age: 58
DRG: 100 | End: 2023-11-20
Payer: MEDICARE

## 2023-11-20 PROBLEM — R56.9 SEIZURE (HCC): Status: ACTIVE | Noted: 2023-11-20

## 2023-11-20 PROBLEM — F33.2 MAJOR DEPRESSIVE DISORDER, RECURRENT SEVERE WITHOUT PSYCHOTIC FEATURES (HCC): Status: ACTIVE | Noted: 2023-11-20

## 2023-11-20 PROBLEM — F41.9 ANXIETY DISORDER: Status: ACTIVE | Noted: 2023-11-20

## 2023-11-20 LAB
ALBUMIN SERPL-MCNC: 3.5 G/DL (ref 3.4–5)
ANION GAP SERPL CALCULATED.3IONS-SCNC: 10 MMOL/L (ref 3–16)
BASOPHILS # BLD: 0 K/UL (ref 0–0.2)
BASOPHILS NFR BLD: 0.9 %
BUN SERPL-MCNC: 39 MG/DL (ref 7–20)
CALCIUM SERPL-MCNC: 8.8 MG/DL (ref 8.3–10.6)
CHLORIDE SERPL-SCNC: 101 MMOL/L (ref 99–110)
CO2 SERPL-SCNC: 25 MMOL/L (ref 21–32)
CREAT SERPL-MCNC: 6.2 MG/DL (ref 0.9–1.3)
DEPRECATED RDW RBC AUTO: 15.2 % (ref 12.4–15.4)
EKG ATRIAL RATE: 112 BPM
EKG DIAGNOSIS: NORMAL
EKG P AXIS: 83 DEGREES
EKG P-R INTERVAL: 154 MS
EKG Q-T INTERVAL: 368 MS
EKG QRS DURATION: 108 MS
EKG QTC CALCULATION (BAZETT): 502 MS
EKG R AXIS: 64 DEGREES
EKG T AXIS: 31 DEGREES
EKG VENTRICULAR RATE: 112 BPM
EOSINOPHIL # BLD: 0.2 K/UL (ref 0–0.6)
EOSINOPHIL NFR BLD: 4.6 %
FUNGUS SPEC CULT: NORMAL
GFR SERPLBLD CREATININE-BSD FMLA CKD-EPI: 10 ML/MIN/{1.73_M2}
GLUCOSE BLD-MCNC: 195 MG/DL (ref 70–99)
GLUCOSE BLD-MCNC: 202 MG/DL (ref 70–99)
GLUCOSE BLD-MCNC: 208 MG/DL (ref 70–99)
GLUCOSE BLD-MCNC: 241 MG/DL (ref 70–99)
GLUCOSE BLD-MCNC: 253 MG/DL (ref 70–99)
GLUCOSE BLD-MCNC: 268 MG/DL (ref 70–99)
GLUCOSE BLD-MCNC: 364 MG/DL (ref 70–99)
GLUCOSE SERPL-MCNC: 224 MG/DL (ref 70–99)
HCT VFR BLD AUTO: 32.9 % (ref 40.5–52.5)
HGB BLD-MCNC: 10.4 G/DL (ref 13.5–17.5)
LOEFFLER MB STN SPEC: NORMAL
LYMPHOCYTES # BLD: 1.3 K/UL (ref 1–5.1)
LYMPHOCYTES NFR BLD: 36.1 %
MCH RBC QN AUTO: 29 PG (ref 26–34)
MCHC RBC AUTO-ENTMCNC: 31.6 G/DL (ref 31–36)
MCV RBC AUTO: 91.7 FL (ref 80–100)
MONOCYTES # BLD: 0.3 K/UL (ref 0–1.3)
MONOCYTES NFR BLD: 7.7 %
NEUTROPHILS # BLD: 1.9 K/UL (ref 1.7–7.7)
NEUTROPHILS NFR BLD: 50.7 %
PERFORMED ON: ABNORMAL
PHOSPHATE SERPL-MCNC: 3.5 MG/DL (ref 2.5–4.9)
PLATELET # BLD AUTO: 138 K/UL (ref 135–450)
PMV BLD AUTO: 10.9 FL (ref 5–10.5)
POTASSIUM SERPL-SCNC: 4.1 MMOL/L (ref 3.5–5.1)
PROCALCITONIN SERPL IA-MCNC: 0.2 NG/ML (ref 0–0.15)
PROLACTIN SERPL IA-MCNC: 15.6 NG/ML
RBC # BLD AUTO: 3.59 M/UL (ref 4.2–5.9)
SODIUM SERPL-SCNC: 136 MMOL/L (ref 136–145)
WBC # BLD AUTO: 3.7 K/UL (ref 4–11)

## 2023-11-20 PROCEDURE — 6370000000 HC RX 637 (ALT 250 FOR IP): Performed by: PSYCHIATRY & NEUROLOGY

## 2023-11-20 PROCEDURE — 6370000000 HC RX 637 (ALT 250 FOR IP): Performed by: STUDENT IN AN ORGANIZED HEALTH CARE EDUCATION/TRAINING PROGRAM

## 2023-11-20 PROCEDURE — 6370000000 HC RX 637 (ALT 250 FOR IP): Performed by: PHYSICIAN ASSISTANT

## 2023-11-20 PROCEDURE — 93010 ELECTROCARDIOGRAM REPORT: CPT | Performed by: INTERNAL MEDICINE

## 2023-11-20 PROCEDURE — 96374 THER/PROPH/DIAG INJ IV PUSH: CPT

## 2023-11-20 PROCEDURE — 85025 COMPLETE CBC W/AUTO DIFF WBC: CPT

## 2023-11-20 PROCEDURE — 90945 DIALYSIS ONE EVALUATION: CPT

## 2023-11-20 PROCEDURE — APPNB30 APP NON BILLABLE TIME 0-30 MINS

## 2023-11-20 PROCEDURE — 2580000003 HC RX 258: Performed by: PHYSICIAN ASSISTANT

## 2023-11-20 PROCEDURE — 2060000000 HC ICU INTERMEDIATE R&B

## 2023-11-20 PROCEDURE — 80069 RENAL FUNCTION PANEL: CPT

## 2023-11-20 PROCEDURE — 6360000002 HC RX W HCPCS: Performed by: FAMILY MEDICINE

## 2023-11-20 PROCEDURE — 36415 COLL VENOUS BLD VENIPUNCTURE: CPT

## 2023-11-20 PROCEDURE — G0378 HOSPITAL OBSERVATION PER HR: HCPCS

## 2023-11-20 PROCEDURE — 99222 1ST HOSP IP/OBS MODERATE 55: CPT | Performed by: PSYCHIATRY & NEUROLOGY

## 2023-11-20 PROCEDURE — 84146 ASSAY OF PROLACTIN: CPT

## 2023-11-20 PROCEDURE — 70551 MRI BRAIN STEM W/O DYE: CPT

## 2023-11-20 PROCEDURE — 6360000002 HC RX W HCPCS: Performed by: PHYSICIAN ASSISTANT

## 2023-11-20 PROCEDURE — 93005 ELECTROCARDIOGRAM TRACING: CPT | Performed by: FAMILY MEDICINE

## 2023-11-20 PROCEDURE — 99233 SBSQ HOSP IP/OBS HIGH 50: CPT | Performed by: INTERNAL MEDICINE

## 2023-11-20 PROCEDURE — 6360000002 HC RX W HCPCS

## 2023-11-20 PROCEDURE — 95816 EEG AWAKE AND DROWSY: CPT | Performed by: PSYCHIATRY & NEUROLOGY

## 2023-11-20 PROCEDURE — 84145 PROCALCITONIN (PCT): CPT

## 2023-11-20 PROCEDURE — 96372 THER/PROPH/DIAG INJ SC/IM: CPT

## 2023-11-20 PROCEDURE — 95819 EEG AWAKE AND ASLEEP: CPT

## 2023-11-20 PROCEDURE — 94760 N-INVAS EAR/PLS OXIMETRY 1: CPT

## 2023-11-20 PROCEDURE — 99223 1ST HOSP IP/OBS HIGH 75: CPT | Performed by: PSYCHIATRY & NEUROLOGY

## 2023-11-20 RX ORDER — INSULIN LISPRO 100 [IU]/ML
0-4 INJECTION, SOLUTION INTRAVENOUS; SUBCUTANEOUS NIGHTLY
Status: DISCONTINUED | OUTPATIENT
Start: 2023-11-20 | End: 2023-11-21

## 2023-11-20 RX ORDER — LORAZEPAM 2 MG/ML
2 INJECTION INTRAMUSCULAR EVERY 6 HOURS PRN
Status: DISCONTINUED | OUTPATIENT
Start: 2023-11-20 | End: 2023-11-24 | Stop reason: HOSPADM

## 2023-11-20 RX ORDER — LORAZEPAM 2 MG/ML
2 INJECTION INTRAMUSCULAR ONCE
Status: DISCONTINUED | OUTPATIENT
Start: 2023-11-20 | End: 2023-11-21

## 2023-11-20 RX ORDER — INSULIN LISPRO 100 [IU]/ML
0-4 INJECTION, SOLUTION INTRAVENOUS; SUBCUTANEOUS
Status: DISCONTINUED | OUTPATIENT
Start: 2023-11-20 | End: 2023-11-21

## 2023-11-20 RX ORDER — INSULIN GLARGINE 100 [IU]/ML
12 INJECTION, SOLUTION SUBCUTANEOUS NIGHTLY
Status: DISCONTINUED | OUTPATIENT
Start: 2023-11-20 | End: 2023-11-22

## 2023-11-20 RX ORDER — MORPHINE SULFATE 4 MG/ML
4 INJECTION, SOLUTION INTRAMUSCULAR; INTRAVENOUS EVERY 4 HOURS PRN
Status: DISCONTINUED | OUTPATIENT
Start: 2023-11-20 | End: 2023-11-24 | Stop reason: HOSPADM

## 2023-11-20 RX ORDER — LEVETIRACETAM 500 MG/1
500 TABLET ORAL DAILY
Status: DISCONTINUED | OUTPATIENT
Start: 2023-11-20 | End: 2023-11-22

## 2023-11-20 RX ORDER — MORPHINE SULFATE 4 MG/ML
INJECTION, SOLUTION INTRAMUSCULAR; INTRAVENOUS
Status: DISPENSED
Start: 2023-11-20 | End: 2023-11-20

## 2023-11-20 RX ORDER — LORAZEPAM 2 MG/ML
INJECTION INTRAMUSCULAR
Status: COMPLETED
Start: 2023-11-20 | End: 2023-11-20

## 2023-11-20 RX ADMIN — RANOLAZINE 500 MG: 500 TABLET, EXTENDED RELEASE ORAL at 12:55

## 2023-11-20 RX ADMIN — HEPARIN SODIUM 5000 UNITS: 5000 INJECTION INTRAVENOUS; SUBCUTANEOUS at 06:02

## 2023-11-20 RX ADMIN — CARVEDILOL 25 MG: 25 TABLET, FILM COATED ORAL at 20:45

## 2023-11-20 RX ADMIN — LORAZEPAM 2 MG: 2 INJECTION INTRAMUSCULAR; INTRAVENOUS at 09:06

## 2023-11-20 RX ADMIN — MORPHINE SULFATE 4 MG: 4 INJECTION, SOLUTION INTRAMUSCULAR; INTRAVENOUS at 09:10

## 2023-11-20 RX ADMIN — RANOLAZINE 500 MG: 500 TABLET, EXTENDED RELEASE ORAL at 20:45

## 2023-11-20 RX ADMIN — SACUBITRIL AND VALSARTAN 1 TABLET: 49; 51 TABLET, FILM COATED ORAL at 20:45

## 2023-11-20 RX ADMIN — ASPIRIN 81 MG: 81 TABLET, COATED ORAL at 12:56

## 2023-11-20 RX ADMIN — ISOSORBIDE MONONITRATE 30 MG: 30 TABLET, EXTENDED RELEASE ORAL at 12:56

## 2023-11-20 RX ADMIN — CYANOCOBALAMIN TAB 1000 MCG 1000 MCG: 1000 TAB at 12:55

## 2023-11-20 RX ADMIN — HEPARIN SODIUM 5000 UNITS: 5000 INJECTION INTRAVENOUS; SUBCUTANEOUS at 12:57

## 2023-11-20 RX ADMIN — HEPARIN SODIUM 5000 UNITS: 5000 INJECTION INTRAVENOUS; SUBCUTANEOUS at 22:27

## 2023-11-20 RX ADMIN — CARVEDILOL 25 MG: 25 TABLET, FILM COATED ORAL at 12:54

## 2023-11-20 RX ADMIN — CLOPIDOGREL BISULFATE 75 MG: 75 TABLET ORAL at 12:55

## 2023-11-20 RX ADMIN — CALCITRIOL CAPSULES 0.25 MCG 0.25 MCG: 0.25 CAPSULE ORAL at 12:55

## 2023-11-20 RX ADMIN — ROSUVASTATIN CALCIUM 40 MG: 40 TABLET, COATED ORAL at 20:45

## 2023-11-20 RX ADMIN — INSULIN GLARGINE 12 UNITS: 100 INJECTION, SOLUTION SUBCUTANEOUS at 20:45

## 2023-11-20 RX ADMIN — LEVETIRACETAM 500 MG: 500 TABLET, FILM COATED ORAL at 12:53

## 2023-11-20 RX ADMIN — Medication 10 ML: at 20:44

## 2023-11-20 RX ADMIN — CALCITRIOL CAPSULES 0.25 MCG 0.25 MCG: 0.25 CAPSULE ORAL at 20:46

## 2023-11-20 RX ADMIN — SACUBITRIL AND VALSARTAN 1 TABLET: 49; 51 TABLET, FILM COATED ORAL at 12:55

## 2023-11-20 RX ADMIN — Medication 10 ML: at 12:59

## 2023-11-20 ASSESSMENT — PAIN SCALES - GENERAL: PAINLEVEL_OUTOF10: 0

## 2023-11-20 NOTE — CONSULTS
PSYCHIATRY CONSULT, INITIAL EVALUATION    Attending Provider:  Andria Johnston MD    CC/Reason for Consult: panic attacks    HPI:   context: Pt is a 65 yo M with hx of CAD, DM type I, ESRD, CHF, presented with episode of lightheadedness and chest pain. He had a rapid response last night with seizure like activity followed by a post-ictal state, and 9 am this morning when he was agitated, not responding to commands and may have been having some panic like symptoms. His wife had noted patient has been under a lot of stress recently. Psych was called to evaluate. associated symptoms:   Pt reports depressive symptoms including anhedonia, feelings of guilt and being a burden on others, fatigue, poor sleep, sense of hopelessness. He denies any suicidal ideation. He reports a lot more struggles with cognitive impairment, short term memory loss. Collateral from wife obtained earlier by Dr. Sai Wood noted that \"patient had been in a lot of stress recently with medical problems as well as personal/family issues   and had similar but less severe episode of ' acting out' before coming to the hospital this time\"    modifying factors: Pt has had episode of depression in the past after his open heart surgery and responded positively to sertraline. Exacerbating factors include stress in his marriage (there are issues accepting that he maintains relationships with ex-gf's). His medical issues severely limit what he can do, where he can go and his dependence on others for his care. He also lost his job due to his health issues. He had a volatile past marriage he still reflects on during which time he had some suicidal ideation (about 8 yrs ago).      Timing: chronic  duration: several yrs  severity: severe    ROS:   Gen: +weakness HEENT: no vision or hearing problems, no HA CV: no cp, no palpitations RESP: no dyspnea : no dysuria MSK: no muscle or joint pain GI: no n/v/d, no abd pain  SKIN: no rashes NEURO:  no weakness, no 67 yo M with depression, multiple chronic health problems, and complaints of cognitive impairment. He's also had some seizure episodes, and some symptoms that may be related to panic attacks / acute anxiety. Social stressors include chronic health problems, marital conflict. He's had prior response to sertraline. Major depressive disorder, recurrent, severe  Anxiety, unspecified    RECOMMENDATIONS:   1. Will start sertraline 50mg daily  2. TSH, B12, folate  3. Would benefit from outpatient mental health treatment. Dispo: does not require inpatient psych    Thank you for this consult, please call the psychiatry consult line for further questions.  I will continue to follow            Graciela Estes MD   Psychiatrist

## 2023-11-20 NOTE — PROGRESS NOTES
Rapid Response Quick Summary    Room: [address] [place] [id number]    Assessment of concern / patient:  Tonic - clonic seizure lasting approximately 3 minutes. Physician involved:  Dr. Álvaro Gamboa    Interventions:  PRN ativan, BMP, Seizure precautions. Disposition:  Pt to remain in 3T with updated orders.

## 2023-11-20 NOTE — PROGRESS NOTES
2147 This nurse got phone call from wife [facility]. Wife stated \"I was just on the phone with Marli Wheeler and he stopped responding\". On upon arrival to room 3372, pt was found convulsing. Rapid response called. Pt turned to side with airway supported. Duration of seizure noted to be 3-4 minutes. No injuries noted. Dennice Felty MD at bedside. Ativan ordered PRN and seizure precautions initiated. Camera in place. Consult neurology for possible EEG if another seizure occurs. 1 Wife [facility] currently at bedside. Pt in postictal phase, alert and responsive. No question or concerns to note at this time.  Electronically signed by Mohinder Vail RN on 11/19/2023 at 10:12 PM

## 2023-11-20 NOTE — PROGRESS NOTES
RRT called around 9:55 pm for tonic-clonic seizure activity during PD lasting around 3 minutes that self-resolved. No tongue biting or B/B incontinence, however, having post-ictal state after activity. Wife contacted, reported patient having a seizure during childhood and another episode about 1.5 years ago, was seen by Neurology, but was not started on AEDs then for isolated episode. Repeat BMP to monitor electrolytes. Continue seizure precautions with Ativan prn. If has another episode then will load with Keppra. Continue to monitor.      Gladis Lorenzo MD  Night physician

## 2023-11-20 NOTE — PROGRESS NOTES
Disconnected from [facility] per protocol. Effluent: Cloudy  Total time: 9 hr 32 min   Total UF:  1322 ml. Total Volume:  7493 ml. Dwell time gained:  0 hr 57 min. Pt Tolerated procedure: Pt. Tolerates procedure well  Report given to: Anthony Harden RN      Treatment completed without complications or complaints from patient. Effluent cloudy and lines taped to patient per protocol. Patient resting comfortably with VSS upon exiting room.       Copy of dialysis treatment record placed in chart, to be scanned into EMR and report given to Anthony Harden RN

## 2023-11-20 NOTE — PROGRESS NOTES
Pt did not display any further seizure activity. HD RN cam to remove pt off of the cycler. Pt tolerated PD well. Dry weight 86.8kg. Pt more alert, no c/o pain.    Hernando Arroyo RN  11p-7a

## 2023-11-20 NOTE — CARE COORDINATION
11/20/23 1259   Readmission Assessment   Number of Days since last admission? 8-30 days   Previous Disposition Home with Family   Who is being Elvia Osler  (The SW spoke with the pt's wife Korey Baker)   What was the patient's/caregiver's perception as to why they think they needed to return back to the hospital? Other (Comment)  (The patient' wife stated the patient had a blood presssure that was low which lead the pt to retrun to the hospital)   Did you visit your Primary Care Physician after you left the hospital, before you returned this time? Yes   Why weren't you able to visit your PCP? Other (Comment)  (n/a)   Did you see a specialist, such as Cardiac, Pulmonary, Orthopedic Physician, etc. after you left the hospital? Yes   Who advised the patient to return to the hospital? Other (Comment)  (Wife-Nathalia.)   Does the patient report anything that got in the way of taking their medications? No   In our efforts to provide the best possible care to you and others like you, can you think of anything that we could have done to help you after you left the hospital the first time, so that you might not have needed to return so soon?  Other (Comment)  (No concerns reported at this time.)

## 2023-11-20 NOTE — PROGRESS NOTES
#Acute Encephalopathy    Response was called around 9 AM  this morning. Patient was found to be agitated, not responsive to commands. Trying to get out of bed but not violent. Kept saying ' let me out, need help' daily and then' to go, I do need help'. He was also holding hand to his chest initially  He was given 2 mg Ativan and also 4 mg morphine initially as pain could not rule out as the cause of agitation with some improvement in his descent  Initial vitals reveal sinus tachycardia, moderate elevated blood pressure.   Brief exam negative for any focal weakness or deficit , normal heart sounds and breath sounds  EKG was done with sinus tachycardia, no ST changes significant for ischemia  Patient's wife arrived at the bedside in the meantime,  She reported patient had been in a lot of stress recently with medical problems as well as personal/family issues   and had similar but less severe episode of ' acting out' before coming to the hospital this time  He was given 2 more Ativan as he is still agitated and trying to get out of bed  He became calmer/sleepy here and blood pressure and heart rate also improved  Slightly severe panic attacks secondary to ongoing multiple stressors, consult to psychiatry for assistance  With cardiology Dr. Macy Andres at the bedside, recommended deferring any cardiac evaluation for now as those are nonurgent and with mostly part of the transplant evaluation      I spent 34 minutes managing this critical illness, not including any procedures

## 2023-11-20 NOTE — CONSULTS
In patient Neurology consult        [facility]      MD Frederic Elmore  1965    Date of Service: 11/20/2023    Referring Physician: Karol Orantes MD    Most of the history was obtained from detailed chart reviewing and discussion with the patient's nurse and spouse who is at bedside. The patient is currently confused and unable to provide me with accurate history. Reason for the consult and CC: Breakthrough seizure    HPI:   The patient is a 66y.o.  years old male with past medical history of childhood epilepsy, type 1 diabetes, ESRD on HD, and other medical problems who came to the hospital after having presyncopal episode at home. This morning patient was noted to be agitated, not following directions. Rapid response was called, patient was given IV morphine and Ativan. Patient is currently lethargic and unable to provide history. Patient spouse at bedside provides most of the history. She reports patient was watching football yesterday and felt lightheaded and felt like he was going to pass out. Patient did not lose conscious during this time. During this episode patient reported to have some chest pain and pressure and came to the emergency room for evaluation. Yesterday evening, patient was noted to have witnessed seizure. Description general motor seizure for approximately 3 minutes minutes. No tongue biting or bowel or bladder incontinence was noted. Patient noted to be postictal.  No clear triggers, or relieving or aggravating factors. Today patient is lethargic, he is arousable but falls back asleep. Review of system is limited        I personally reviewed and updated social history, past medical history, medications, allergy, surgical history, and family history as documented in the patient's electronic health records. ROS: 10-14 system review, reviewed with patient's family and/or nurse was unremarkable except mentioned in HPI.      Constitutional: Cardiology is consulted. Hypertension  Hyperlipidemia  ESRD on HD  Diabetes      Recommendation:     MRI brain was ordered  EEG  Start Keppra 500 mg daily for now. Close monitoring of renal function  Continue AP therapy  Statin  Monitor and control blood pressure. Follow-up cardiology work-up  Hemodialysis  Glycemic control  SSI while inpatient  PT and OT  GI and DVT prophylaxis  Seizure precautions  No driving for 3 months until cleared by his physician. We will follow  Discussed with wife, nurse      Thank you for referring such patient. If you have any questions regarding my consult note, please don't hesitate to call me. JAYLEN Mata - NISHA    Attending Supervising Physician's Attestation Statement   I reviewed and agree with the findings and plan as documented in NP consult note   Patient seen and examined and discussed with his nurse  Currently is waxing and waning but able to answer question  No gaze preference  Face is symmetric  Moves arms and legs spontaneously  No focal weakness  Tone is normal with symmetric DTRs 2+ throughout  Plantars downgoing    Likely breakthrough seizure in a patient with history of childhood epilepsy. We will start AED at this time. Similar presentation was a year ago  We will start Keppra 500 mg twice daily  Continue dialysis  Follow Keppra level  Consider extra dose on the day of dialysis  Seizure precautions  PT, OT and speech  Driving restriction  Agree with above note  We will follow      Electronically signed by Kayleen Gomez MD on 11/20/23 at 2:41 PM EST       This dictation was generated by voice recognition computer software.  Although all attempts are made to edit the dictation for accuracy, there may be errors in the  transcription that are not intended

## 2023-11-20 NOTE — PLAN OF CARE
Problem: Discharge Planning  Goal: Discharge to home or other facility with appropriate resources  11/20/2023 0245 by Alicia Sherwood RN  Outcome: Progressing  Flowsheets (Taken 11/19/2023 2345)  Discharge to home or other facility with appropriate resources:   Identify barriers to discharge with patient and caregiver   Arrange for needed discharge resources and transportation as appropriate   Identify discharge learning needs (meds, wound care, etc)   Refer to discharge planning if patient needs post-hospital services based on physician order or complex needs related to functional status, cognitive ability or social support system     Problem: Pain  Goal: Verbalizes/displays adequate comfort level or baseline comfort level  11/20/2023 0245 by Alicia Sherwood RN  Outcome: Progressing  Flowsheets (Taken 11/20/2023 0245)  Verbalizes/displays adequate comfort level or baseline comfort level:   Encourage patient to monitor pain and request assistance   Assess pain using appropriate pain scale   Administer analgesics based on type and severity of pain and evaluate response     Problem: Safety - Adult  Goal: Free from fall injury  11/20/2023 0245 by Alicia Sherwood RN  Outcome: Progressing  Flowsheets (Taken 11/20/2023 0245)  Free From Fall Injury: Instruct family/caregiver on patient safety  Note: Pt placed in seizure precautions Camera placed in room.       Problem: Chronic Conditions and Co-morbidities  Goal: Patient's chronic conditions and co-morbidity symptoms are monitored and maintained or improved  Outcome: Progressing  Flowsheets (Taken 11/19/2023 2345)  Care Plan - Patient's Chronic Conditions and Co-Morbidity Symptoms are Monitored and Maintained or Improved:   Monitor and assess patient's chronic conditions and comorbid symptoms for stability, deterioration, or improvement   Collaborate with multidisciplinary team to address chronic and comorbid conditions and prevent exacerbation or deterioration

## 2023-11-20 NOTE — CONSULTS
0.06 05/20/2022 05:18 PM     U/A:    Lab Results   Component Value Date/Time    COLORU Yellow 11/18/2023 08:24 PM    PROTEINU TRACE 11/18/2023 08:24 PM    PHUR 8.0 11/18/2023 08:24 PM    WBCUA 3 11/18/2023 08:24 PM    RBCUA 0 11/18/2023 08:24 PM    MUCUS 1+ 05/25/2010 07:19 PM    BACTERIA None Seen 11/18/2023 08:24 PM    CLARITYU Clear 11/18/2023 08:24 PM    SPECGRAV 1.010 11/18/2023 08:24 PM    LEUKOCYTESUR Negative 11/18/2023 08:24 PM    UROBILINOGEN 0.2 11/18/2023 08:24 PM    BILIRUBINUR Negative 11/18/2023 08:24 PM    BILIRUBINUR NEGATIVE 05/25/2010 07:19 PM    BLOODU Negative 11/18/2023 08:24 PM    GLUCOSEU >=1000 11/18/2023 08:24 PM    GLUCOSEU >=1000 05/25/2010 07:19 PM    AMORPHOUS 2+ 12/07/2021 05:08 AM           IMPRESSION/RECOMMENDATIONS:      ESRD on PD  On cycler at night    Hyperkalemia   Corrected with PD to a K of 4.8  T1DM with hyperglycemia   Per primary team  Anemia of CKD  Renal osteodystrophy   Monitor Ca , Phos   CAD : S/ P CABG 2001, PCI and stent 2021  Chest pain: Being investigated  Seizure: Neurology on the case    Thank you for allowing me to participate in the care of this patient. I will continue to follow along. Please call with questions.     Sol Patel MD, MD  11/20/2023  [facility]

## 2023-11-20 NOTE — PROCEDURES
Patient: Claudette Lawrence    MR Number: [id number]  YOB: 1965  Date of Visit: 11/20/2023    Clinical History:  The patient is a 66y.o. years old male with breakthrough seizure      Method: The EEG was performed utilizing the international 10/20 of electrode placements of both referential and bipolar montages. The patient was  drowsy through out the recording. Photic stimulation was performed. Findings: The background of the EEG showed  generalized diffuse slowing throughout the recording in the mixture of delta and theta with average amplitude. This generalized slowing was symmetric, non rhythmical, and continuous. No spike or sharp waves were seen. Photic stimulation did not activate EEG. Impression: This EEG is abnormal.  The generalized diffuse slowing is suggestive of moderate diffuse encephalopathy. There is no evidence of epileptiform discharges, focal, or lateralizing abnormalities.       Fabiola Palacios MD      Board certified in clinical neurophysiology

## 2023-11-20 NOTE — CARE COORDINATION
Case Management Assessment  Initial Evaluation    Date/Time of Evaluation: 11/20/2023 1:12 PM  Assessment Completed by: ROBIN Rae    If patient is discharged prior to next notation, then this note serves as note for discharge by case management. Patient Name: Meliza Ordoñez                   YOB: 1965  Diagnosis: Chest pain [R07.9]  Generalized weakness [R53.1]  Chest pain, unspecified type [R07.9]                   Date / Time: 11/18/2023  3:58 PM    Patient Admission Status: Observation   Readmission Risk (Low < 19, Mod (19-27), High > 27): Readmission Risk Score: 26.3    Current PCP: Minh Polk MD  PCP verified by CM? Yes    Chart Reviewed: Yes      History Provided by: Other (see comment) (The SW spoke with the patient's wife Eneida Lovell via phone.)  Patient Orientation: Other (see comment) (The SW spoke with the patient's wife Eneida Lovell via phone.)    Patient Cognition: Other (see comment) (The SW spoke with the patient's wife Eneida Lovell via phone.)    Hospitalization in the last 30 days (Readmission):  Yes    If yes, Readmission Assessment in CM Navigator will be completed. Advance Directives:      Code Status: Full Code   Patient's Primary Decision Maker is: Named in Scanned ACP Document      Discharge Planning:    Patient lives with: Children, Spouse/Significant Other Type of Home: House  Primary Care Giver: Self  Patient Support Systems include: Spouse/Significant Other, Children, Family Members   Current Financial resources: Medicare  Current community resources: ECF/Home Care  Current services prior to admission: Durable Medical Equipment, Home Care            Current DME: [facility]            Type of Home Care services:  Nursing Services    ADLS  Prior functional level: Independent in ADLs/IADLs  Current functional level:  Other (see comment) (Therapy needed to determine current level of Function)    PT AM-PAC:   /24  OT AM-PAC:   /24    Family can provide assistance at DC:

## 2023-11-20 NOTE — PLAN OF CARE
Problem: Pain  Goal: Verbalizes/displays adequate comfort level or baseline comfort level  11/20/2023 1514 by Jacques Rinne, RN  Outcome: Progressing  Note: Pt denies pain at this time. No acute distress noted. Will continue to assess.    11/20/2023 0245 by Sean Messer RN  Outcome: Progressing  Flowsheets (Taken 11/20/2023 0245)  Verbalizes/displays adequate comfort level or baseline comfort level:   Encourage patient to monitor pain and request assistance   Assess pain using appropriate pain scale   Administer analgesics based on type and severity of pain and evaluate response

## 2023-11-21 LAB
ALBUMIN SERPL-MCNC: 2.8 G/DL (ref 3.4–5)
ANION GAP SERPL CALCULATED.3IONS-SCNC: 11 MMOL/L (ref 3–16)
APPEARANCE FLUID: CLEAR
BDY FLUID QUALITY: NORMAL
BUN SERPL-MCNC: 43 MG/DL (ref 7–20)
CALCIUM SERPL-MCNC: 8.2 MG/DL (ref 8.3–10.6)
CELL COUNT FLUID TYPE: NORMAL
CHLORIDE SERPL-SCNC: 99 MMOL/L (ref 99–110)
CO2 SERPL-SCNC: 21 MMOL/L (ref 21–32)
COLOR FLUID: COLORLESS
CREAT SERPL-MCNC: 6.6 MG/DL (ref 0.9–1.3)
DEPRECATED RDW RBC AUTO: 15 % (ref 12.4–15.4)
DIFF PNL FLD: NORMAL
GFR SERPLBLD CREATININE-BSD FMLA CKD-EPI: 9 ML/MIN/{1.73_M2}
GLUCOSE BLD-MCNC: 181 MG/DL (ref 70–99)
GLUCOSE BLD-MCNC: 255 MG/DL (ref 70–99)
GLUCOSE BLD-MCNC: 368 MG/DL (ref 70–99)
GLUCOSE BLD-MCNC: 394 MG/DL (ref 70–99)
GLUCOSE BLD-MCNC: 416 MG/DL (ref 70–99)
GLUCOSE SERPL-MCNC: 471 MG/DL (ref 70–99)
HCT VFR BLD AUTO: 31.6 % (ref 40.5–52.5)
HGB BLD-MCNC: 10.1 G/DL (ref 13.5–17.5)
MCH RBC QN AUTO: 29.1 PG (ref 26–34)
MCHC RBC AUTO-ENTMCNC: 31.8 G/DL (ref 31–36)
MCV RBC AUTO: 91.5 FL (ref 80–100)
NUC CELL # FLD: 9 /CUMM
PERFORMED ON: ABNORMAL
PHOSPHATE SERPL-MCNC: 4 MG/DL (ref 2.5–4.9)
PLATELET # BLD AUTO: 121 K/UL (ref 135–450)
PMV BLD AUTO: 10.6 FL (ref 5–10.5)
POTASSIUM SERPL-SCNC: 5.1 MMOL/L (ref 3.5–5.1)
RBC # BLD AUTO: 3.45 M/UL (ref 4.2–5.9)
RBC FLUID: <1000 /CUMM
SODIUM SERPL-SCNC: 131 MMOL/L (ref 136–145)
WBC # BLD AUTO: 3.3 K/UL (ref 4–11)

## 2023-11-21 PROCEDURE — 96375 TX/PRO/DX INJ NEW DRUG ADDON: CPT

## 2023-11-21 PROCEDURE — 99232 SBSQ HOSP IP/OBS MODERATE 35: CPT | Performed by: INTERNAL MEDICINE

## 2023-11-21 PROCEDURE — 90945 DIALYSIS ONE EVALUATION: CPT

## 2023-11-21 PROCEDURE — APPSS45 APP SPLIT SHARED TIME 31-45 MINUTES

## 2023-11-21 PROCEDURE — 80069 RENAL FUNCTION PANEL: CPT

## 2023-11-21 PROCEDURE — 6370000000 HC RX 637 (ALT 250 FOR IP): Performed by: STUDENT IN AN ORGANIZED HEALTH CARE EDUCATION/TRAINING PROGRAM

## 2023-11-21 PROCEDURE — 85027 COMPLETE CBC AUTOMATED: CPT

## 2023-11-21 PROCEDURE — 6370000000 HC RX 637 (ALT 250 FOR IP): Performed by: PSYCHIATRY & NEUROLOGY

## 2023-11-21 PROCEDURE — APPNB30 APP NON BILLABLE TIME 0-30 MINS

## 2023-11-21 PROCEDURE — 6360000002 HC RX W HCPCS: Performed by: PHYSICIAN ASSISTANT

## 2023-11-21 PROCEDURE — 2060000000 HC ICU INTERMEDIATE R&B

## 2023-11-21 PROCEDURE — 89050 BODY FLUID CELL COUNT: CPT

## 2023-11-21 PROCEDURE — 36415 COLL VENOUS BLD VENIPUNCTURE: CPT

## 2023-11-21 PROCEDURE — 99233 SBSQ HOSP IP/OBS HIGH 50: CPT | Performed by: PSYCHIATRY & NEUROLOGY

## 2023-11-21 PROCEDURE — 2580000003 HC RX 258: Performed by: PHYSICIAN ASSISTANT

## 2023-11-21 PROCEDURE — G0378 HOSPITAL OBSERVATION PER HR: HCPCS

## 2023-11-21 PROCEDURE — 6370000000 HC RX 637 (ALT 250 FOR IP): Performed by: PHYSICIAN ASSISTANT

## 2023-11-21 PROCEDURE — 6360000002 HC RX W HCPCS: Performed by: STUDENT IN AN ORGANIZED HEALTH CARE EDUCATION/TRAINING PROGRAM

## 2023-11-21 PROCEDURE — 99231 SBSQ HOSP IP/OBS SF/LOW 25: CPT | Performed by: PSYCHIATRY & NEUROLOGY

## 2023-11-21 PROCEDURE — 96372 THER/PROPH/DIAG INJ SC/IM: CPT

## 2023-11-21 RX ORDER — INSULIN LISPRO 100 [IU]/ML
0-8 INJECTION, SOLUTION INTRAVENOUS; SUBCUTANEOUS
Status: DISCONTINUED | OUTPATIENT
Start: 2023-11-21 | End: 2023-11-22

## 2023-11-21 RX ORDER — LABETALOL HYDROCHLORIDE 5 MG/ML
10 INJECTION, SOLUTION INTRAVENOUS EVERY 4 HOURS PRN
Status: DISCONTINUED | OUTPATIENT
Start: 2023-11-21 | End: 2023-11-24 | Stop reason: HOSPADM

## 2023-11-21 RX ADMIN — Medication 10 ML: at 10:00

## 2023-11-21 RX ADMIN — INSULIN GLARGINE 12 UNITS: 100 INJECTION, SOLUTION SUBCUTANEOUS at 20:57

## 2023-11-21 RX ADMIN — SACUBITRIL AND VALSARTAN 1 TABLET: 49; 51 TABLET, FILM COATED ORAL at 20:56

## 2023-11-21 RX ADMIN — HEPARIN SODIUM 5000 UNITS: 5000 INJECTION INTRAVENOUS; SUBCUTANEOUS at 16:17

## 2023-11-21 RX ADMIN — LORAZEPAM 4 MG: 2 INJECTION INTRAMUSCULAR; INTRAVENOUS at 09:14

## 2023-11-21 RX ADMIN — INSULIN LISPRO 4 UNITS: 100 INJECTION, SOLUTION INTRAVENOUS; SUBCUTANEOUS at 20:56

## 2023-11-21 RX ADMIN — FUROSEMIDE 40 MG: 40 TABLET ORAL at 16:11

## 2023-11-21 RX ADMIN — ROSUVASTATIN CALCIUM 40 MG: 40 TABLET, COATED ORAL at 20:56

## 2023-11-21 RX ADMIN — Medication 10 ML: at 20:56

## 2023-11-21 RX ADMIN — LABETALOL HYDROCHLORIDE 10 MG: 5 INJECTION INTRAVENOUS at 11:48

## 2023-11-21 RX ADMIN — RANOLAZINE 500 MG: 500 TABLET, EXTENDED RELEASE ORAL at 20:56

## 2023-11-21 RX ADMIN — HEPARIN SODIUM 5000 UNITS: 5000 INJECTION INTRAVENOUS; SUBCUTANEOUS at 05:36

## 2023-11-21 RX ADMIN — LEVETIRACETAM 500 MG: 500 TABLET, FILM COATED ORAL at 16:11

## 2023-11-21 RX ADMIN — SERTRALINE 50 MG: 50 TABLET, FILM COATED ORAL at 16:11

## 2023-11-21 RX ADMIN — CARVEDILOL 25 MG: 25 TABLET, FILM COATED ORAL at 20:56

## 2023-11-21 RX ADMIN — INSULIN LISPRO 8 UNITS: 100 INJECTION, SOLUTION INTRAVENOUS; SUBCUTANEOUS at 11:40

## 2023-11-21 RX ADMIN — CALCITRIOL CAPSULES 0.25 MCG 0.25 MCG: 0.25 CAPSULE ORAL at 21:55

## 2023-11-21 RX ADMIN — INSULIN LISPRO 4 UNITS: 100 INJECTION, SOLUTION INTRAVENOUS; SUBCUTANEOUS at 09:20

## 2023-11-21 RX ADMIN — HEPARIN SODIUM 5000 UNITS: 5000 INJECTION INTRAVENOUS; SUBCUTANEOUS at 21:56

## 2023-11-21 ASSESSMENT — PAIN SCALES - GENERAL
PAINLEVEL_OUTOF10: 0

## 2023-11-21 NOTE — PROGRESS NOTES
PSYCHIATRY CONSULT PROGRESS NOTE    2023  Alyssa Echeverria  [id number]    CC/Reason for Consult: depression    S: Pt continues to deal with a lot of guilt, depressed mood, feeling he is a burden on his wife and family. He feels tired of having the illnesses he deals with chronically. He is further burdened by the seizures he is having and sees it as a sign of his impending death. He denies he is suicidal, states he wants to live, but feels he will die soon naturally from his illness, citing the early deaths of several of his family members. Pt wrote some statements on a piece of paper of what he'd want when he dies and what should happen to his things. His daughter read this and was concerned. When I discussed it with him, he states that when other family members have  there were considerable arguing amongst family as to what would happened for the  arrangements and their personal affects after they  and he didn't want to burden them with this. Pt found out a couple days ago that his wife found out about him talking to another woman just 11 days before their marriage. She was hurt by this and this further made patient feel a sense of guilt although he is happy it is out on the table. Behavior issues in last 24hours: none. He had a seizure like episode this AM, got ativan and was heavily sedated most of the day. Neurology did witness the event - pt was started on keppra. Family has given collateral information that they feel stress precipitates the episodes. Reviewed staff/RN notes. ROS: no cp, no dyspnea, +general weakness. No n/v, no abd pain.       insulin lispro  0-8 Units SubCUTAneous 4x Daily AC & HS    LORazepam  2 mg IntraVENous Once    [Held by provider] Insulin Pump - Basal Dose   SubCUTAneous Daily    levETIRAcetam  500 mg Oral Daily    insulin glargine  12 Units SubCUTAneous Nightly    sertraline  50 mg Oral Daily    sodium chloride flush  5-40 mL IntraVENous 2 times

## 2023-11-21 NOTE — PROGRESS NOTES
Nutrition Education    Pt triggered for heart failure diet education. Upon visiting, pt reported familiar with CHF diet guidelines of watching sodium and fluid intake, denied need for verbal education. Provided handout on heart failure nutrition therapy. Time spent with patient: 5 minutes. Pt expressed concern about following diets for multiple other disease states (DM, ESRD on HD, CAD). Briefly reviewed different guidelines with pt and provided nutrition therapy handouts on each disease state detailing foods recommended vs foods to avoid. This RD left name and number should pt or spouse have any questions regarding materials provided. Educated on 11/21  Learners: Patient and spouse  Readiness: Acceptance  Method: Handout  Response: Verbalizes Understanding  Contact name and number provided.     Stu Blount, MS, RD, LD  Contact Number: [phone]

## 2023-11-21 NOTE — PROGRESS NOTES
overall quality of the study is good. There is no significant artifact. Left ventricular cavity size is moderately dilated. Right ventricle is   normal.    Spect imaging demonstrates a medium size defect of moderate severity in the   inferior lateral wall. The defect is predominant scar with minimal   reversibility. There is associated wall motion abnormality. Sum stress score of 5. No visual TID. Calculated TID of 0.90. Left ventricular ejection fraction is mildly decreased at 46%. **Myocardial perfusion is abnormal. Predominant fixed defect with minimal reversibility. Moderate risk scan. **    Echo 6/23   Summary   Definity was used to assist with endocardial border delineation. Moderately dilated left ventricular cavity size. Normal left ventricular   wall thickness. Moderately diminished left ventricular systolic function   with an estimated ejection fraction of 35%. Global hypokinesis with regional   variations. No evidence of apical thrombus with Definity. Grade I diastolic dysfunction with normal LV filling pressures. The right ventricle is normal in size and systolic function. Mild mitral regurgitation. Cardiac cath 4/9/21:   Procedure:          LHC, PCI of SVG  Indication:          NSTEMI     Procedure Details:  Consent Access Bleed R Sedat Start Stop Versed Fentanyl Contrast Fluoro EBL Comp Spec   Yes RCFA high MCSFC 1456 1554 1.5 75 132 13.4 <20 None None   *Ultrasound Note: Ultrasound guidance used to determine aforementioned artery patency, size (>2mm), anatomic variations and ideal puncture location. Real-time ultrasound utilized concurrent with vascular needle entry into the artery. Image(s) permanently recorded and reported in the patient chart.   *Sedation Note: MCSFC = minimal conscious sedation for comfort     Findings:  Artery Findings/Result   LM Normal   % ostial   Cx 100% mid   % ostial   RCA Nondominant, Smaller RV marginal with mid 70% and distal 90%

## 2023-11-21 NOTE — PROGRESS NOTES
Disconnected from [facility] per protocol. Effluent: Clear  Total time: 9 hr 40 min   Total UF:  -904 ml. Total Volume:  7503 ml. Dwell time gained:  0 hr 22 min. Pt Tolerated procedure: Pt. Tolerates procedure well today  Report given to: Sigrid Houser RN      Treatment completed without complications or complaints from patient. Effluent clear and lines taped to patient per protocol. Patient resting comfortably with VSS upon exiting room.       Copy of dialysis treatment record placed in chart, to be scanned into EMR and report given to Sigrid Houser RN

## 2023-11-21 NOTE — PROGRESS NOTES
CCPD Order   Exchanges: 3   Exchange Volume: 2500 ml   Total Time: 9 hrs   Dextrose: 1.5%   Total Volume: 7500 ml     Orders verified. Supplies taken to pt's room. Report received. Cycler set up, primed and pre tested. [facility]. Pt connected to cycler. CCPD initiated without problem. Initial effluent clear. If problems should arise please call the [phone] number on top of PD cycler machine. Treatment initiated without complications or complaints from patient. Patient resting comfortably with VSS upon dialysis RN exit from room. Lizzeth Salcedo RN  notified of start of treatment and hotline number located on top of machine for any questions about troubleshooting during after hours.

## 2023-11-21 NOTE — PLAN OF CARE
Problem: Discharge Planning  Goal: Discharge to home or other facility with appropriate resources  11/21/2023 0032 by Andria Chase RN  Outcome: Progressing  11/20/2023 1514 by Gennaro Turner RN  Outcome: Progressing     Problem: Pain  Goal: Verbalizes/displays adequate comfort level or baseline comfort level  11/21/2023 0032 by Andria Chase RN  Outcome: Progressing  11/20/2023 1514 by Gennaro Turner RN  Outcome: Progressing  Note: Pt denies pain at this time. No acute distress noted. Will continue to assess.       Problem: Safety - Adult  Goal: Free from fall injury  11/21/2023 0032 by Andria Chase RN  Outcome: Progressing  11/20/2023 1514 by Gennaro Turner RN  Outcome: Progressing     Problem: Chronic Conditions and Co-morbidities  Goal: Patient's chronic conditions and co-morbidity symptoms are monitored and maintained or improved  11/21/2023 0032 by Andria Chase RN  Outcome: Progressing  11/20/2023 1514 by Gennaro Turner RN  Outcome: Progressing

## 2023-11-21 NOTE — PROGRESS NOTES
Rapid Response Quick Summary    Room: 9ZK-3774/0289-20    Assessment of concern / patient:  RR called due to patient having possible seizure, panic attack. Physician involved:  Dr Charly Oden    Interventions:  EKG, ativan and morphine    Disposition:  stay on unit but will do psych consult and move patient closer to nurses station.

## 2023-11-21 NOTE — DISCHARGE INSTRUCTIONS
[address]:  [phone]    [facility]:   [employer]  [address], [address] [phone]  Hours of Operation: Monday-Thursday 8am-8pm; Friday 8am-4pm  Accepts: [place] Medicaid, [employer], Self-pay; Private Insurance    Services Provided:  -Individual/Group Counseling  -Diagnostic Assessment  -Pharmacological Management  -Case management Services  -School Based treatment-[facility]  -Substance use treatment, Adults and Teenagers    [facility] Urgent Career. - [facility]Three Locations  [facility] [address] [facility], [address] [phone]  [facility], [address] [phone]  Hours of Operation: Monday and Tuesday 8am-7pm; Wednesday and Thursday 8am-6pm; Friday 8am-4:00pm  [address], [address]  Hours: Monday 2-3:30pm; Tuesday 9-10:30am; Thursday 1-2:30pm. Must bring proof of income, SS card, Insurance card, ID, proof of custody if seeking treatment for child    Services Provided:  -Mental Health and Substance Use Counseling (individual and group)  -Diagnostic Assessments  -Pharmacological management  -Vocational/Employment-Workplace Associates Program  -Case Management, Psycho/social groups; DBT groups  -[facility] 24 hours/7 days per week [phone]. [employer] acts as a safety net for individuals throughout [place] who is experiencing acute mental health emergency. Services provided: Over the phone consultation crisis support, mobile crisis, hospital pre-screening, post crisis follow-up to include emergency appointments and medication management; referral linkage    [facility]  [address]  [place] [id number], [address] [phone]  Population Served: Ages 3+, couples, families, adults, seniors  Hours of Operation: Monday-Thursday 8am-7pm; Welihp-1lw-3:30pm    -[address] for seniors whom are homebound diagnosed with Depression. Services are in collaboration with [place] [facility] [place], [place] and offered in the home  [facility]-Short term prescription assistance and transitional case management, assists in referral linkage for on-going mental health/substance use treatment. Does not provide substance use treatment. You can do any of the following for psychiatric emergencies:   Call the [facility] psychiatric emergency hotline at [address]- Call the 24/7 Crisis line at [phone]  3. Access the [facility] Emergency Psychiatry Services:     - Go to the [facility] Psychiatric Emergency Services (PES) at [address], [address]   - Call the [address] at [phone].    - Call the [facility] Mobile Crisis Team at [phone]   4. Go to any emergency room or call 911  5. Visit or call [facility] at [address]  [facility], [facility], [address] [phone].

## 2023-11-21 NOTE — PROGRESS NOTES
Date of Admission: 11/18/2023. Hospital Day: 4       Assessment/Plan:     66 y.o. male who presented to ED for evaluation after a presyncopal episode. Patient was watching a football game when he stood up and became very lightheaded and like he was going to pass out. He did not actually lose consciousness. Patient also developed nonradiating, substernal chest pain/pressure after arrival to ED. Active Hospital Problems    Diagnosis     New onset seizure ([address]) [R56.9]     Major depressive disorder, recurrent severe without psychotic features ([address]) [F33.2]     Anxiety disorder [F41.9]     ESRD (end stage renal disease) ([address]) [N18.6]     HTN (hypertension), benign [I10]     Chest pain [R07.9]      Seizure disorder  Who noted episode of tonic-clonic seizure last night, symptoms ordered for 3 minutes and patient was postictal , this morning he was awake and alert  History of childhood epilepsy , was on Dilantin but taken off eventually and had seizure activity about a year ago, currently not on medications  EEG showed no evidence of epileptiform discharge, focal, or lateralizing abnormalities. MRI brain showed No acute brain parenchymal abnormality. PT OT  Seizure precaution  Telemetry monitor  Showed neurology    Major depressive disorder:  Anxiety:  Per wife, patient has been undergoing a lot of stress. Sertraline 50 milligram daily  Psychiatrist consulted      Chest pain. CAD s/p CABG (2001) and PTCA SVG > OM-1 (2021)  EKG shows NSR, rate 89, no evidence of acute ischemia or infarction.   Troponins 101 > 95, stable, chronically elevated in the setting of renal failure  Continue home ASA, Plavix, BB, statin, Imdur, Ranexa  NTG for chest pain PRN  Monitor on telemetry  Had stress test 10/2023 with findings of  moderate size  defect in inferolat wall with minimal reversibility  Cardiology consulted,       Dizziness  Obtain orthostatic vitals      Type 1 DM with gastroparesis  Last HbA1c was 9.0  Continue home

## 2023-11-21 NOTE — CARE COORDINATION
11/21/23 1450   IMM Letter   IMM Letter given to Patient/Family/Significant other/Guardian/POA/by: IMM Letter given to Patient by LAURA De Luna. Patient signed IMM.    IMM Letter date given: 11/21/23   IMM Letter time given: 1447         Electronically signed by ROBIN Beltrán on 11/21/2023 at 2:51 PM

## 2023-11-22 LAB
ANION GAP SERPL CALCULATED.3IONS-SCNC: 11 MMOL/L (ref 3–16)
APPEARANCE FLUID: CLEAR
BACTERIA FLD AEROBE CULT: NORMAL
BDY FLUID QUALITY: NORMAL
BUN SERPL-MCNC: 42 MG/DL (ref 7–20)
CALCIUM SERPL-MCNC: 8.6 MG/DL (ref 8.3–10.6)
CELL COUNT FLUID TYPE: NORMAL
CHLORIDE SERPL-SCNC: 98 MMOL/L (ref 99–110)
CK SERPL-CCNC: 71 U/L (ref 39–308)
CO2 SERPL-SCNC: 25 MMOL/L (ref 21–32)
COLOR FLUID: COLORLESS
CREAT SERPL-MCNC: 6.7 MG/DL (ref 0.9–1.3)
DEPRECATED RDW RBC AUTO: 14.6 % (ref 12.4–15.4)
DIFF PNL FLD: NORMAL
GFR SERPLBLD CREATININE-BSD FMLA CKD-EPI: 9 ML/MIN/{1.73_M2}
GLUCOSE BLD-MCNC: 177 MG/DL (ref 70–99)
GLUCOSE BLD-MCNC: 221 MG/DL (ref 70–99)
GLUCOSE BLD-MCNC: 280 MG/DL (ref 70–99)
GLUCOSE BLD-MCNC: 335 MG/DL (ref 70–99)
GLUCOSE SERPL-MCNC: 219 MG/DL (ref 70–99)
GRAM STN SPEC: NORMAL
HCT VFR BLD AUTO: 31.7 % (ref 40.5–52.5)
HGB BLD-MCNC: 10.2 G/DL (ref 13.5–17.5)
LACTATE BLDV-SCNC: 1 MMOL/L (ref 0.4–2)
MCH RBC QN AUTO: 29.1 PG (ref 26–34)
MCHC RBC AUTO-ENTMCNC: 32.3 G/DL (ref 31–36)
MCV RBC AUTO: 90.2 FL (ref 80–100)
NUC CELL # FLD: 4 /CUMM
PERFORMED ON: ABNORMAL
PLATELET # BLD AUTO: 127 K/UL (ref 135–450)
PMV BLD AUTO: 10.1 FL (ref 5–10.5)
POTASSIUM SERPL-SCNC: 4 MMOL/L (ref 3.5–5.1)
RBC # BLD AUTO: 3.51 M/UL (ref 4.2–5.9)
RBC FLUID: <1000 /CUMM
SODIUM SERPL-SCNC: 134 MMOL/L (ref 136–145)
WBC # BLD AUTO: 4 K/UL (ref 4–11)

## 2023-11-22 PROCEDURE — 83605 ASSAY OF LACTIC ACID: CPT

## 2023-11-22 PROCEDURE — 82607 VITAMIN B-12: CPT

## 2023-11-22 PROCEDURE — 90945 DIALYSIS ONE EVALUATION: CPT

## 2023-11-22 PROCEDURE — 99232 SBSQ HOSP IP/OBS MODERATE 35: CPT | Performed by: PSYCHIATRY & NEUROLOGY

## 2023-11-22 PROCEDURE — 99233 SBSQ HOSP IP/OBS HIGH 50: CPT | Performed by: INTERNAL MEDICINE

## 2023-11-22 PROCEDURE — 89050 BODY FLUID CELL COUNT: CPT

## 2023-11-22 PROCEDURE — 96372 THER/PROPH/DIAG INJ SC/IM: CPT

## 2023-11-22 PROCEDURE — APPNB30 APP NON BILLABLE TIME 0-30 MINS

## 2023-11-22 PROCEDURE — 6370000000 HC RX 637 (ALT 250 FOR IP): Performed by: PSYCHIATRY & NEUROLOGY

## 2023-11-22 PROCEDURE — 36415 COLL VENOUS BLD VENIPUNCTURE: CPT

## 2023-11-22 PROCEDURE — 2580000003 HC RX 258: Performed by: PHYSICIAN ASSISTANT

## 2023-11-22 PROCEDURE — 80048 BASIC METABOLIC PNL TOTAL CA: CPT

## 2023-11-22 PROCEDURE — 82746 ASSAY OF FOLIC ACID SERUM: CPT

## 2023-11-22 PROCEDURE — 6360000002 HC RX W HCPCS: Performed by: PHYSICIAN ASSISTANT

## 2023-11-22 PROCEDURE — G0378 HOSPITAL OBSERVATION PER HR: HCPCS

## 2023-11-22 PROCEDURE — 99231 SBSQ HOSP IP/OBS SF/LOW 25: CPT | Performed by: PSYCHIATRY & NEUROLOGY

## 2023-11-22 PROCEDURE — 6370000000 HC RX 637 (ALT 250 FOR IP)

## 2023-11-22 PROCEDURE — APPSS45 APP SPLIT SHARED TIME 31-45 MINUTES

## 2023-11-22 PROCEDURE — 82550 ASSAY OF CK (CPK): CPT

## 2023-11-22 PROCEDURE — 85027 COMPLETE CBC AUTOMATED: CPT

## 2023-11-22 PROCEDURE — 6370000000 HC RX 637 (ALT 250 FOR IP): Performed by: PHYSICIAN ASSISTANT

## 2023-11-22 PROCEDURE — 2060000000 HC ICU INTERMEDIATE R&B

## 2023-11-22 PROCEDURE — 6370000000 HC RX 637 (ALT 250 FOR IP): Performed by: STUDENT IN AN ORGANIZED HEALTH CARE EDUCATION/TRAINING PROGRAM

## 2023-11-22 RX ORDER — LEVETIRACETAM 500 MG/1
500 TABLET ORAL 2 TIMES DAILY
Status: DISCONTINUED | OUTPATIENT
Start: 2023-11-22 | End: 2023-11-24 | Stop reason: HOSPADM

## 2023-11-22 RX ADMIN — SACUBITRIL AND VALSARTAN 1 TABLET: 49; 51 TABLET, FILM COATED ORAL at 08:13

## 2023-11-22 RX ADMIN — INSULIN LISPRO 6 UNITS: 100 INJECTION, SOLUTION INTRAVENOUS; SUBCUTANEOUS at 11:50

## 2023-11-22 RX ADMIN — LEVETIRACETAM 500 MG: 500 TABLET, FILM COATED ORAL at 08:13

## 2023-11-22 RX ADMIN — ISOSORBIDE MONONITRATE 30 MG: 30 TABLET, EXTENDED RELEASE ORAL at 08:13

## 2023-11-22 RX ADMIN — FUROSEMIDE 40 MG: 40 TABLET ORAL at 17:15

## 2023-11-22 RX ADMIN — HEPARIN SODIUM 5000 UNITS: 5000 INJECTION INTRAVENOUS; SUBCUTANEOUS at 14:29

## 2023-11-22 RX ADMIN — CALCITRIOL CAPSULES 0.25 MCG 0.25 MCG: 0.25 CAPSULE ORAL at 08:12

## 2023-11-22 RX ADMIN — HEPARIN SODIUM 5000 UNITS: 5000 INJECTION INTRAVENOUS; SUBCUTANEOUS at 05:38

## 2023-11-22 RX ADMIN — CYANOCOBALAMIN TAB 1000 MCG 1000 MCG: 1000 TAB at 08:13

## 2023-11-22 RX ADMIN — CLOPIDOGREL BISULFATE 75 MG: 75 TABLET ORAL at 08:13

## 2023-11-22 RX ADMIN — RANOLAZINE 500 MG: 500 TABLET, EXTENDED RELEASE ORAL at 21:46

## 2023-11-22 RX ADMIN — ROSUVASTATIN CALCIUM 40 MG: 40 TABLET, COATED ORAL at 21:46

## 2023-11-22 RX ADMIN — ASPIRIN 81 MG: 81 TABLET, COATED ORAL at 08:13

## 2023-11-22 RX ADMIN — LEVETIRACETAM 500 MG: 500 TABLET, FILM COATED ORAL at 21:47

## 2023-11-22 RX ADMIN — FUROSEMIDE 40 MG: 40 TABLET ORAL at 08:13

## 2023-11-22 RX ADMIN — CARVEDILOL 25 MG: 25 TABLET, FILM COATED ORAL at 21:47

## 2023-11-22 RX ADMIN — SERTRALINE 50 MG: 50 TABLET, FILM COATED ORAL at 08:13

## 2023-11-22 RX ADMIN — HEPARIN SODIUM 5000 UNITS: 5000 INJECTION INTRAVENOUS; SUBCUTANEOUS at 21:47

## 2023-11-22 RX ADMIN — Medication 10 ML: at 08:13

## 2023-11-22 RX ADMIN — RANOLAZINE 500 MG: 500 TABLET, EXTENDED RELEASE ORAL at 08:13

## 2023-11-22 RX ADMIN — CALCITRIOL CAPSULES 0.25 MCG 0.25 MCG: 0.25 CAPSULE ORAL at 21:47

## 2023-11-22 RX ADMIN — CARVEDILOL 25 MG: 25 TABLET, FILM COATED ORAL at 08:13

## 2023-11-22 RX ADMIN — SACUBITRIL AND VALSARTAN 1 TABLET: 49; 51 TABLET, FILM COATED ORAL at 21:46

## 2023-11-22 RX ADMIN — Medication 10 ML: at 21:50

## 2023-11-22 ASSESSMENT — PAIN SCALES - GENERAL
PAINLEVEL_OUTOF10: 0

## 2023-11-22 NOTE — PROGRESS NOTES
Disconnected from CCPD per protocol. Effluent: Clear  Total time: 9 hr 43 min   Total UF:  1004 ml. Total Volume:  7497 ml. Dwell time gained:  0 hr 22 min. Pt Tolerated procedure: Pt. Tolerates procedure well today  Report given to: Ami Cheek RN        Treatment completed without complications or complaints from patient. Effluent clear and lines taped to patient per protocol. Patient resting comfortably with VSS upon exiting room.       Copy of dialysis treatment record placed in chart, to be scanned into EMR and report given to Ami Cheek RN

## 2023-11-22 NOTE — PROGRESS NOTES
CCPD Order   Exchanges: 3   Exchange Volume: 2500 ml   Total Time: 9 hrs   Dextrose: 1.5%   Total Volume: 7500 ml     Orders verified. Supplies taken to pt's room. Report received. Cycler set up, primed and pre tested. [facility]. Pt connected to cycler. CCPD initiated without problem. Initial effluent clear. If problems should arise please call the [phone] number on top of PD cycler machine. Treatment initiated without complications or complaints from patient. Patient resting comfortably with VSS upon dialysis RN exit from room. , RN notified of start of treatment and hotline number located on top of machine for any questions about troubleshooting during after hours.

## 2023-11-22 NOTE — PLAN OF CARE
Problem: Discharge Planning  Goal: Discharge to home or other facility with appropriate resources  11/22/2023 1056 by Andres Munoz RN  Outcome: Progressing     Problem: Pain  Goal: Verbalizes/displays adequate comfort level or baseline comfort level  11/22/2023 1056 by Andres Munoz RN  Outcome: Progressing     Problem: Safety - Adult  Goal: Free from fall injury  11/22/2023 1056 by Andres Munoz RN  Outcome: Progressing     Problem: Chronic Conditions and Co-morbidities  Goal: Patient's chronic conditions and co-morbidity symptoms are monitored and maintained or improved  11/22/2023 1056 by Andres Munoz RN  Outcome: Progressing     Problem: Neurosensory - Adult  Goal: Achieves stable or improved neurological status  Outcome: Progressing  Goal: Absence of seizures  Outcome: Progressing  Goal: Remains free of injury related to seizures activity  Outcome: Progressing  Goal: Achieves maximal functionality and self care  Outcome: Progressing     Problem: Respiratory - Adult  Goal: Achieves optimal ventilation and oxygenation  Outcome: Progressing     Problem: Cardiovascular - Adult  Goal: Maintains optimal cardiac output and hemodynamic stability  Outcome: Progressing  Goal: Absence of cardiac dysrhythmias or at baseline  Outcome: Progressing

## 2023-11-22 NOTE — DIALYSIS
Treatment initiated without complications or complaints from patient. Patient resting comfortably with VSS upon dialysis RN exit from room. Andria Chase RN notified of start of treatment and hotline number located on top of machine for any questions about troubleshooting during after hours.

## 2023-11-22 NOTE — PROGRESS NOTES
Date of Admission: 11/18/2023. Hospital Day: 5       Assessment/Plan:     66 y.o. male who presented to ED for evaluation after a presyncopal episode. Patient was watching a football game when he stood up and became very lightheaded and like he was going to pass out. He did not actually lose consciousness. Patient also developed nonradiating, substernal chest pain/pressure after arrival to ED. Active Hospital Problems    Diagnosis     New onset seizure ([address]) [R56.9]     Major depressive disorder, recurrent severe without psychotic features ([address]) [F33.2]     Anxiety disorder [F41.9]     ESRD (end stage renal disease) ([address]) [N18.6]     HTN (hypertension), benign [I10]     Chest pain [R07.9]      Seizure disorder  Who noted episode of tonic-clonic seizure last night, symptoms ordered for 3 minutes and patient was postictal , this morning he was awake and alert  History of childhood epilepsy , was on Dilantin but taken off eventually and had seizure activity about a year ago, currently not on medications  EEG showed no evidence of epileptiform discharge, focal, or lateralizing abnormalities. MRI brain showed No acute brain parenchymal abnormality. PT OT  Keppra  Seizure precaution  Telemetry monitor  Showed neurology    Major depressive disorder:  Anxiety:  Per wife, patient has been undergoing a lot of stress. Sertraline 50 milligram daily  Psychiatrist consulted      Chest pain. CAD s/p CABG (2001) and PTCA SVG > OM-1 (2021)  EKG shows NSR, rate 89, no evidence of acute ischemia or infarction.   Troponins 101 > 95, stable, chronically elevated in the setting of renal failure  Continue home ASA, Plavix, BB, statin, Imdur, Ranexa  NTG for chest pain PRN  Monitor on telemetry  Had stress test 10/2023 with findings of  moderate size  defect in inferolat wall with minimal reversibility  Cardiology consulted,       Dizziness  Obtain orthostatic vitals   Manish caceres     Type 1 DM with gastroparesis  Last HbA1c was 10.1* 10.2*   HCT 32.9* 31.6* 31.7*    121* 127*       Recent Labs     11/20/23  0130 11/21/23  0513    131*   K 4.1 5.1    99   CO2 25 21   BUN 39* 43*   CREATININE 6.2* 6.6*   CALCIUM 8.8 8.2*   PHOS 3.5 4.0       No results for input(s): \"AST\", \"ALT\", \"BILIDIR\", \"BILITOT\", \"ALKPHOS\" in the last 72 hours. No results for input(s): \"INR\" in the last 72 hours. Recent Labs     11/22/23  0526   CKTOTAL 71         Urinalysis:      Lab Results   Component Value Date/Time    NITRU Negative 11/18/2023 08:24 PM    WBCUA 3 11/18/2023 08:24 PM    BACTERIA None Seen 11/18/2023 08:24 PM    RBCUA 0 11/18/2023 08:24 PM    BLOODU Negative 11/18/2023 08:24 PM    Debby Cable 1.010 11/18/2023 08:24 PM    GLUCOSEU >=1000 11/18/2023 08:24 PM    GLUCOSEU >=1000 05/25/2010 07:19 PM       Radiology:  MRI BRAIN WO CONTRAST   Final Result   Mild cerebral atrophy. Mild chronic small vessel ischemic changes. No acute   brain parenchymal abnormality. CT HEAD WO CONTRAST   Final Result   Mild atrophy and mild chronic microischemic changes scattered in the deep   white matter which is unchanged with no acute intracranial abnormality. Moderate chronic pansinusitis which is unchanged. XR CHEST PORTABLE   Final Result   No acute process.              IP CONSULT TO CARDIOLOGY  IP CONSULT TO NEPHROLOGY  IP CONSULT TO DIABETES EDUCATOR  IP CONSULT TO NEUROLOGY  IP CONSULT TO PSYCHIATRY  IP CONSULT TO Kathleen Pina MD

## 2023-11-23 LAB
ANION GAP SERPL CALCULATED.3IONS-SCNC: 13 MMOL/L (ref 3–16)
APPEARANCE FLUID: CLEAR
BASOPHILS # BLD: 0 K/UL (ref 0–0.2)
BASOPHILS NFR BLD: 0.7 %
BDY FLUID QUALITY: NORMAL
BUN SERPL-MCNC: 38 MG/DL (ref 7–20)
CALCIUM SERPL-MCNC: 9 MG/DL (ref 8.3–10.6)
CELL COUNT FLUID TYPE: NORMAL
CHLORIDE SERPL-SCNC: 98 MMOL/L (ref 99–110)
CO2 SERPL-SCNC: 24 MMOL/L (ref 21–32)
COLOR FLUID: COLORLESS
CREAT SERPL-MCNC: 6.4 MG/DL (ref 0.9–1.3)
DEPRECATED RDW RBC AUTO: 14.9 % (ref 12.4–15.4)
DIFF PNL FLD: NORMAL
EOSINOPHIL # BLD: 0.2 K/UL (ref 0–0.6)
EOSINOPHIL NFR BLD: 5.8 %
FOLATE SERPL-MCNC: 3.28 NG/ML (ref 4.78–24.2)
GFR SERPLBLD CREATININE-BSD FMLA CKD-EPI: 9 ML/MIN/{1.73_M2}
GLUCOSE BLD-MCNC: 108 MG/DL (ref 70–99)
GLUCOSE BLD-MCNC: 163 MG/DL (ref 70–99)
GLUCOSE BLD-MCNC: 175 MG/DL (ref 70–99)
GLUCOSE BLD-MCNC: 207 MG/DL (ref 70–99)
GLUCOSE SERPL-MCNC: 85 MG/DL (ref 70–99)
HCT VFR BLD AUTO: 34.8 % (ref 40.5–52.5)
HGB BLD-MCNC: 11.3 G/DL (ref 13.5–17.5)
LYMPHOCYTES # BLD: 1.8 K/UL (ref 1–5.1)
LYMPHOCYTES NFR BLD: 46.7 %
MCH RBC QN AUTO: 29.9 PG (ref 26–34)
MCHC RBC AUTO-ENTMCNC: 32.5 G/DL (ref 31–36)
MCV RBC AUTO: 92 FL (ref 80–100)
MONOCYTES # BLD: 0.4 K/UL (ref 0–1.3)
MONOCYTES NFR BLD: 9.9 %
NEUTROPHILS # BLD: 1.4 K/UL (ref 1.7–7.7)
NEUTROPHILS NFR BLD: 36.9 %
NUC CELL # FLD: 2 /CUMM
PERFORMED ON: ABNORMAL
PLATELET # BLD AUTO: 123 K/UL (ref 135–450)
PMV BLD AUTO: 10.1 FL (ref 5–10.5)
POTASSIUM SERPL-SCNC: 3.9 MMOL/L (ref 3.5–5.1)
RBC # BLD AUTO: 3.78 M/UL (ref 4.2–5.9)
RBC FLUID: 2 /CUMM
SODIUM SERPL-SCNC: 135 MMOL/L (ref 136–145)
TSH SERPL DL<=0.005 MIU/L-ACNC: 1.37 UIU/ML (ref 0.27–4.2)
VIT B12 SERPL-MCNC: 1136 PG/ML (ref 211–911)
WBC # BLD AUTO: 3.8 K/UL (ref 4–11)

## 2023-11-23 PROCEDURE — 2580000003 HC RX 258: Performed by: PHYSICIAN ASSISTANT

## 2023-11-23 PROCEDURE — 97161 PT EVAL LOW COMPLEX 20 MIN: CPT

## 2023-11-23 PROCEDURE — 2060000000 HC ICU INTERMEDIATE R&B

## 2023-11-23 PROCEDURE — 6360000002 HC RX W HCPCS: Performed by: PHYSICIAN ASSISTANT

## 2023-11-23 PROCEDURE — 97165 OT EVAL LOW COMPLEX 30 MIN: CPT

## 2023-11-23 PROCEDURE — 36415 COLL VENOUS BLD VENIPUNCTURE: CPT

## 2023-11-23 PROCEDURE — 84443 ASSAY THYROID STIM HORMONE: CPT

## 2023-11-23 PROCEDURE — 6370000000 HC RX 637 (ALT 250 FOR IP)

## 2023-11-23 PROCEDURE — 80048 BASIC METABOLIC PNL TOTAL CA: CPT

## 2023-11-23 PROCEDURE — G0378 HOSPITAL OBSERVATION PER HR: HCPCS

## 2023-11-23 PROCEDURE — 6370000000 HC RX 637 (ALT 250 FOR IP): Performed by: PHYSICIAN ASSISTANT

## 2023-11-23 PROCEDURE — 89050 BODY FLUID CELL COUNT: CPT

## 2023-11-23 PROCEDURE — 85025 COMPLETE CBC W/AUTO DIFF WBC: CPT

## 2023-11-23 PROCEDURE — 90945 DIALYSIS ONE EVALUATION: CPT

## 2023-11-23 PROCEDURE — 96372 THER/PROPH/DIAG INJ SC/IM: CPT

## 2023-11-23 PROCEDURE — 6370000000 HC RX 637 (ALT 250 FOR IP): Performed by: PSYCHIATRY & NEUROLOGY

## 2023-11-23 RX ADMIN — ROSUVASTATIN CALCIUM 40 MG: 40 TABLET, COATED ORAL at 21:16

## 2023-11-23 RX ADMIN — SACUBITRIL AND VALSARTAN 1 TABLET: 49; 51 TABLET, FILM COATED ORAL at 21:16

## 2023-11-23 RX ADMIN — CARVEDILOL 25 MG: 25 TABLET, FILM COATED ORAL at 08:09

## 2023-11-23 RX ADMIN — CALCITRIOL CAPSULES 0.25 MCG 0.25 MCG: 0.25 CAPSULE ORAL at 23:12

## 2023-11-23 RX ADMIN — CYANOCOBALAMIN TAB 1000 MCG 1000 MCG: 1000 TAB at 08:09

## 2023-11-23 RX ADMIN — ASPIRIN 81 MG: 81 TABLET, COATED ORAL at 08:09

## 2023-11-23 RX ADMIN — LEVETIRACETAM 500 MG: 500 TABLET, FILM COATED ORAL at 21:16

## 2023-11-23 RX ADMIN — RANOLAZINE 500 MG: 500 TABLET, EXTENDED RELEASE ORAL at 08:09

## 2023-11-23 RX ADMIN — SERTRALINE 50 MG: 50 TABLET, FILM COATED ORAL at 08:09

## 2023-11-23 RX ADMIN — CLOPIDOGREL BISULFATE 75 MG: 75 TABLET ORAL at 08:09

## 2023-11-23 RX ADMIN — RANOLAZINE 500 MG: 500 TABLET, EXTENDED RELEASE ORAL at 21:16

## 2023-11-23 RX ADMIN — FUROSEMIDE 40 MG: 40 TABLET ORAL at 08:09

## 2023-11-23 RX ADMIN — CALCITRIOL CAPSULES 0.25 MCG 0.25 MCG: 0.25 CAPSULE ORAL at 08:09

## 2023-11-23 RX ADMIN — HEPARIN SODIUM 5000 UNITS: 5000 INJECTION INTRAVENOUS; SUBCUTANEOUS at 15:23

## 2023-11-23 RX ADMIN — FUROSEMIDE 40 MG: 40 TABLET ORAL at 17:24

## 2023-11-23 RX ADMIN — Medication 10 ML: at 08:09

## 2023-11-23 RX ADMIN — HEPARIN SODIUM 5000 UNITS: 5000 INJECTION INTRAVENOUS; SUBCUTANEOUS at 06:50

## 2023-11-23 RX ADMIN — ISOSORBIDE MONONITRATE 30 MG: 30 TABLET, EXTENDED RELEASE ORAL at 08:09

## 2023-11-23 RX ADMIN — HEPARIN SODIUM 5000 UNITS: 5000 INJECTION INTRAVENOUS; SUBCUTANEOUS at 23:10

## 2023-11-23 RX ADMIN — SACUBITRIL AND VALSARTAN 1 TABLET: 49; 51 TABLET, FILM COATED ORAL at 08:08

## 2023-11-23 RX ADMIN — Medication 10 ML: at 21:16

## 2023-11-23 RX ADMIN — LEVETIRACETAM 500 MG: 500 TABLET, FILM COATED ORAL at 08:09

## 2023-11-23 RX ADMIN — CARVEDILOL 25 MG: 25 TABLET, FILM COATED ORAL at 21:16

## 2023-11-23 NOTE — PROGRESS NOTES
Treatment time: 9 Hours 26 minutes  Net UF: -127 ml  Dwell Time: 94 minutes  Lost    Treatment completed without complications or complaints from patient. Effluent clear yellow and lines taped to patient per protocol. Patient resting comfortably with VSS upon exiting room. Copy of dialysis treatment record placed in chart, to be scanned into EMR and report given to Mily Arriola RN.

## 2023-11-23 NOTE — PLAN OF CARE
Problem: Discharge Planning  Goal: Discharge to home or other facility with appropriate resources  Outcome: Progressing  Flowsheets (Taken 11/23/2023 0818)  Discharge to home or other facility with appropriate resources:   Identify barriers to discharge with patient and caregiver   Arrange for needed discharge resources and transportation as appropriate   Identify discharge learning needs (meds, wound care, etc)     Problem: Neurosensory - Adult  Goal: Achieves stable or improved neurological status  Outcome: Progressing  Flowsheets (Taken 11/23/2023 0818)  Achieves stable or improved neurological status:   Initiate measures to prevent increased intracranial pressure   Assess for and report changes in neurological status   Maintain blood pressure and fluid volume within ordered parameters to optimize cerebral perfusion and minimize risk of hemorrhage  Goal: Absence of seizures  Outcome: Progressing  Flowsheets (Taken 11/23/2023 0818)  Absence of seizures:   If seizure occurs, turn head to side and suction secretions as needed   Monitor for seizure activity.   If seizure occurs, document type and location of movements and any associated apnea   Administer anticonvulsants as ordered  Goal: Remains free of injury related to seizures activity  Outcome: Progressing  Flowsheets (Taken 11/23/2023 0818)  Remains free of injury related to seizure activity:   Maintain airway, patient safety  and administer oxygen as ordered   Monitor patient for seizure activity, document and report duration and description of seizure to Licensed Independent Practitioner   If seizure occurs, turn patient to side and suction secretions as needed   Reorient patient post seizure

## 2023-11-23 NOTE — PROGRESS NOTES
[address] [facility] Department   Phone: [phone]    Occupational Therapy    [x] Initial Evaluation            [] Daily Treatment Note         [] Discharge Summary      Patient: Mary Oscar   : 1965   MRN: [id number]   Date of Service:  2023    Admitting Diagnosis:  Chest pain  Current Admission Summary: The patient is a 66 y.o. male with significant past medical history of   ESRD on dialysis, coronary artery disease s/p CABG, carotid artery stenosis s/p stent right internal carotid artery, ischemic cardiomyopathy, hypertension, hyperlipidemia, nephrolithiasis  T1 diabetes diagnosed when he was 7years old who presents with lightheadedness . He experienced a near syncope, this happened he was watching a football game and when he stood up he became very lightheaded and felt he was going to pass out. There was no loss of consciousness. .  There was no seizure activity no tongue bite or no urinary or bladder incontinence. He also developed substernal chest pain which was nonradiating once he arrived in the ER. He does have a past history of coronary artery disease s/p CABG and PCI and stenting. Past Medical History:  has a past medical history of Angina at rest, Cardiomyopathy [facility]), Carotid artery stenosis, CHF (congestive heart failure) ([address]), CKD (chronic kidney disease) stage 2, GFR 60-89 ml/min, Coronary artery disease, Diabetic peripheral neuropathy ([address]), Diastolic HF (heart failure) ([address]), Hyperlipidemia with target LDL less than 70, Hypertension goal BP (blood pressure) < 130/80, PVD (peripheral vascular disease) ([address]), Seizures ([address]), and Type 1 diabetes mellitus with chronic kidney disease ([address]). Past Surgical History:  has a past surgical history that includes Cardiac surgery (); Coronary artery bypass graft; Cardiac catheterization; hernia repair; Tonsillectomy; LAPAROSCOPY INSERTION PERITONEAL CATHETER (N/A, 2020);  Carotid stent placement

## 2023-11-23 NOTE — PROGRESS NOTES
[address] [facility] Department   Phone: [phone]    Physical Therapy    [x] Initial Evaluation            [] Daily Treatment Note         [] Discharge Summary      Patient: Torri Donato   : 1965   MRN: [id number]   Date of Service:  2023  Admitting Diagnosis: Chest pain    Current Admission Summary: The patient is a 66 y.o. male with significant past medical history of   ESRD on dialysis, coronary artery disease s/p CABG, carotid artery stenosis s/p stent right internal carotid artery, ischemic cardiomyopathy, hypertension, hyperlipidemia, nephrolithiasis  T1 diabetes diagnosed when he was 7years old who presents with lightheadedness. He experienced a near syncope, this happened he was watching a football game and when he stood up he became very lightheaded and felt he was going to pass out. There was no loss of consciousness. .  There was no seizure activity no tongue bite or no urinary or bladder incontinence. He also developed substernal chest pain which was nonradiating once he arrived in the ER. He does have a past history of coronary artery disease s/p CABG and PCI and stenting. Past Medical History:  has a past medical history of Angina at rest, Cardiomyopathy [facility]), Carotid artery stenosis, CHF (congestive heart failure) ([address]), CKD (chronic kidney disease) stage 2, GFR 60-89 ml/min, Coronary artery disease, Diabetic peripheral neuropathy ([address]), Diastolic HF (heart failure) ([address]), Hyperlipidemia with target LDL less than 70, Hypertension goal BP (blood pressure) < 130/80, PVD (peripheral vascular disease) ([address]), Seizures ([address]), and Type 1 diabetes mellitus with chronic kidney disease ([address]). Past Surgical History:  has a past surgical history that includes Cardiac surgery (); Coronary artery bypass graft; Cardiac catheterization; hernia repair; Tonsillectomy; LAPAROSCOPY INSERTION PERITONEAL CATHETER (N/A, 2020);  Carotid stent placement (Right);

## 2023-11-24 VITALS
OXYGEN SATURATION: 98 % | SYSTOLIC BLOOD PRESSURE: 136 MMHG | WEIGHT: 198.19 LBS | DIASTOLIC BLOOD PRESSURE: 77 MMHG | TEMPERATURE: 97.8 F | HEART RATE: 76 BPM | HEIGHT: 67 IN | BODY MASS INDEX: 31.11 KG/M2 | RESPIRATION RATE: 17 BRPM

## 2023-11-24 LAB
ANION GAP SERPL CALCULATED.3IONS-SCNC: 10 MMOL/L (ref 3–16)
BASOPHILS # BLD: 0 K/UL (ref 0–0.2)
BASOPHILS NFR BLD: 0.5 %
BUN SERPL-MCNC: 37 MG/DL (ref 7–20)
CALCIUM SERPL-MCNC: 8.7 MG/DL (ref 8.3–10.6)
CHLORIDE SERPL-SCNC: 100 MMOL/L (ref 99–110)
CO2 SERPL-SCNC: 25 MMOL/L (ref 21–32)
CREAT SERPL-MCNC: 6.5 MG/DL (ref 0.9–1.3)
DEPRECATED RDW RBC AUTO: 15 % (ref 12.4–15.4)
EOSINOPHIL # BLD: 0.2 K/UL (ref 0–0.6)
EOSINOPHIL NFR BLD: 5.5 %
GFR SERPLBLD CREATININE-BSD FMLA CKD-EPI: 9 ML/MIN/{1.73_M2}
GLUCOSE BLD-MCNC: 158 MG/DL (ref 70–99)
GLUCOSE BLD-MCNC: 165 MG/DL (ref 70–99)
GLUCOSE SERPL-MCNC: 184 MG/DL (ref 70–99)
HCT VFR BLD AUTO: 32.7 % (ref 40.5–52.5)
HGB BLD-MCNC: 10.6 G/DL (ref 13.5–17.5)
LYMPHOCYTES # BLD: 1.5 K/UL (ref 1–5.1)
LYMPHOCYTES NFR BLD: 40.1 %
MCH RBC QN AUTO: 29.5 PG (ref 26–34)
MCHC RBC AUTO-ENTMCNC: 32.5 G/DL (ref 31–36)
MCV RBC AUTO: 90.9 FL (ref 80–100)
MONOCYTES # BLD: 0.4 K/UL (ref 0–1.3)
MONOCYTES NFR BLD: 12 %
NEUTROPHILS # BLD: 1.5 K/UL (ref 1.7–7.7)
NEUTROPHILS NFR BLD: 41.9 %
PERFORMED ON: ABNORMAL
PERFORMED ON: ABNORMAL
PLATELET # BLD AUTO: 126 K/UL (ref 135–450)
PMV BLD AUTO: 10.3 FL (ref 5–10.5)
POTASSIUM SERPL-SCNC: 3.7 MMOL/L (ref 3.5–5.1)
RBC # BLD AUTO: 3.6 M/UL (ref 4.2–5.9)
SODIUM SERPL-SCNC: 135 MMOL/L (ref 136–145)
WBC # BLD AUTO: 3.7 K/UL (ref 4–11)

## 2023-11-24 PROCEDURE — 2580000003 HC RX 258: Performed by: PHYSICIAN ASSISTANT

## 2023-11-24 PROCEDURE — 85025 COMPLETE CBC W/AUTO DIFF WBC: CPT

## 2023-11-24 PROCEDURE — G0378 HOSPITAL OBSERVATION PER HR: HCPCS

## 2023-11-24 PROCEDURE — 6370000000 HC RX 637 (ALT 250 FOR IP): Performed by: PSYCHIATRY & NEUROLOGY

## 2023-11-24 PROCEDURE — 6360000002 HC RX W HCPCS: Performed by: PHYSICIAN ASSISTANT

## 2023-11-24 PROCEDURE — 80048 BASIC METABOLIC PNL TOTAL CA: CPT

## 2023-11-24 PROCEDURE — 96372 THER/PROPH/DIAG INJ SC/IM: CPT

## 2023-11-24 PROCEDURE — 36415 COLL VENOUS BLD VENIPUNCTURE: CPT

## 2023-11-24 PROCEDURE — 6370000000 HC RX 637 (ALT 250 FOR IP): Performed by: PHYSICIAN ASSISTANT

## 2023-11-24 PROCEDURE — 6370000000 HC RX 637 (ALT 250 FOR IP)

## 2023-11-24 RX ORDER — LEVETIRACETAM 500 MG/1
500 TABLET ORAL 2 TIMES DAILY
Qty: 60 TABLET | Refills: 3 | Status: SHIPPED | OUTPATIENT
Start: 2023-11-24

## 2023-11-24 RX ORDER — CARVEDILOL 25 MG/1
25 TABLET ORAL 2 TIMES DAILY
Qty: 60 TABLET | Refills: 3 | Status: SHIPPED | OUTPATIENT
Start: 2023-11-24

## 2023-11-24 RX ADMIN — FUROSEMIDE 40 MG: 40 TABLET ORAL at 08:49

## 2023-11-24 RX ADMIN — CYANOCOBALAMIN TAB 1000 MCG 1000 MCG: 1000 TAB at 08:48

## 2023-11-24 RX ADMIN — CALCITRIOL CAPSULES 0.25 MCG 0.25 MCG: 0.25 CAPSULE ORAL at 08:48

## 2023-11-24 RX ADMIN — SERTRALINE 50 MG: 50 TABLET, FILM COATED ORAL at 08:49

## 2023-11-24 RX ADMIN — RANOLAZINE 500 MG: 500 TABLET, EXTENDED RELEASE ORAL at 08:49

## 2023-11-24 RX ADMIN — Medication 10 ML: at 08:50

## 2023-11-24 RX ADMIN — ASPIRIN 81 MG: 81 TABLET, COATED ORAL at 08:49

## 2023-11-24 RX ADMIN — CLOPIDOGREL BISULFATE 75 MG: 75 TABLET ORAL at 08:49

## 2023-11-24 RX ADMIN — ISOSORBIDE MONONITRATE 30 MG: 30 TABLET, EXTENDED RELEASE ORAL at 08:48

## 2023-11-24 RX ADMIN — CARVEDILOL 25 MG: 25 TABLET, FILM COATED ORAL at 08:49

## 2023-11-24 RX ADMIN — SACUBITRIL AND VALSARTAN 1 TABLET: 49; 51 TABLET, FILM COATED ORAL at 08:49

## 2023-11-24 RX ADMIN — HEPARIN SODIUM 5000 UNITS: 5000 INJECTION INTRAVENOUS; SUBCUTANEOUS at 05:38

## 2023-11-24 RX ADMIN — LEVETIRACETAM 500 MG: 500 TABLET, FILM COATED ORAL at 08:49

## 2023-11-24 ASSESSMENT — PAIN SCALES - GENERAL: PAINLEVEL_OUTOF10: 0

## 2023-11-24 NOTE — PROGRESS NOTES
Treatment initiated without complications or complaints from patient. Patient resting comfortably with VSS upon dialysis RN exit from room. Milan Cain, RN notified of start of treatment and hotline number located on top of machine for any questions about troubleshooting during after hours.

## 2023-11-24 NOTE — PROGRESS NOTES
Patient dc'd to home. Dc instructions reviewed with patient. Patient verbalized understanding. Follow up appointments and medications removed. IV access removed without complication. Patient to lobby via wheelchair.

## 2023-11-24 NOTE — DISCHARGE SUMMARY
Hospital Medicine Discharge Summary    Patient ID: Chloe Sims      Patient's PCP: Ellen Martin MD    Admit Date: 11/18/2023     Discharge Date:     Admitting Physician: Karrie Williamson MD     Discharge Physician: Zulay Hook MD     Discharge Diagnoses:    Principal Problem:    Chest pain  Active Problems:    HTN (hypertension), benign    ESRD (end stage renal disease) ([address])    New onset seizure ([address])    Major depressive disorder, recurrent severe without psychotic features ([address])    Anxiety disorder  Resolved Problems:    * No resolved hospital problems. *        The patient was seen and examined on day of discharge and this discharge summary is in conjunction with any daily progress note from day of discharge. Hospital Course:     Chloe Sims is a 66 y.o. male who was admitted for presyncopal episode. Seizure disorder  Who noted episode of tonic-clonic seizure in the hospital. Patient has history of childhood epilepsy , was on Dilantin but taken off eventually and had seizure activity about a year ago. Currently, not on medications  EEG showed no evidence of epileptiform discharge, focal, or lateralizing abnormalities. MRI brain showed No acute brain parenchymal abnormality. Neurology was consulted and patient was started on Keppra. Patient will follow-up with neurologist outpatient. Major depressive disorder:  Anxiety:  Per wife, patient has been undergoing a lot of stress. Psychiatrist was consulted and patient was a started on sertraline 50 mg daily. Patient will follow-up with psychiatrist outpatient. Chest pain. CAD s/p CABG (2001) and PTCA SVG > OM-1 (2021)  EKG shows NSR, rate 89, no evidence of acute ischemia or infarction. Troponins 101 > 95, stable, chronically elevated in the setting of renal failure. Continued home ASA, Plavix, BB, statin, Imdur, Ranexa. Patient was placed on telemetry.  Had stress test 10/2023 with findings of  moderate size  defect in inferolat wall with Activity: activity as tolerated    Diet: cardiac diet      Discharge Medications:     Current Discharge Medication List             Details   ENTRESTO 49-51 MG per tablet TAKE 1 TABLET BY MOUTH TWICE  DAILY  Qty: 180 tablet, Refills: 3    Comments: Please send a replace/new response with 90-Day Supply if appropriate to maximize member benefit. Requesting 1 year supply. carvedilol (COREG) 25 MG tablet Take 0.5 tablets by mouth 2 times daily  Qty: 180 tablet, Refills: 1      furosemide (LASIX) 40 MG tablet Take 1 tablet by mouth in the morning and 1 tablet in the evening. Qty: 60 tablet, Refills: 3      insulin lispro (HUMALOG) 100 UNIT/ML SOLN injection vial Use 30-40 units daily via pump  Qty: 20 mL, Refills: 2    Associated Diagnoses: Type 1 diabetes mellitus with stage 4 chronic kidney disease (HCC)      diclofenac sodium (VOLTAREN) 1 % GEL Apply 4 g topically 4 times daily as needed      lactulose (CHRONULAC) 10 GM/15ML solution Take 30 mLs by mouth 3 times daily as needed for Constipation      hydrALAZINE (APRESOLINE) 50 MG tablet Take 1 tablet by mouth 3 times daily  Qty: 270 tablet, Refills: 1      isosorbide mononitrate (IMDUR) 30 MG extended release tablet Take 1 tablet by mouth daily  Qty: 90 tablet, Refills: 2      ranolazine (RANEXA) 500 MG extended release tablet TAKE 1 TABLET BY MOUTH TWICE DAILY  Qty: 180 tablet, Refills: 2    Comments: ZERO refills remain on this prescription.  Your patient is requesting advance approval of refills for this medication to [address] Diagnoses: Coronary artery disease due to lipid rich plaque      metoclopramide (REGLAN) 5 MG tablet TAKE 1 TABLET BY MOUTH 3 TIMES  DAILY WITH EVERY MEAL AS  DIRECTED  Qty: 30 tablet, Refills: 0      Continuous Blood Gluc Sensor (DEXCOM G6 SENSOR) MISC USE AS DIRECTED CHANGE EVERY 10 DAYS  Qty: 9 each, Refills: 1    Associated Diagnoses: Type 1 diabetes mellitus with stage 4 chronic kidney disease ([address])

## 2023-11-24 NOTE — PLAN OF CARE
Problem: Discharge Planning  Goal: Discharge to home or other facility with appropriate resources  Outcome: Progressing     Problem: Pain  Goal: Verbalizes/displays adequate comfort level or baseline comfort level  Outcome: Progressing     Problem: Safety - Adult  Goal: Free from fall injury  Outcome: Progressing     Problem: Chronic Conditions and Co-morbidities  Goal: Patient's chronic conditions and co-morbidity symptoms are monitored and maintained or improved  Outcome: Progressing     Problem: Neurosensory - Adult  Goal: Achieves stable or improved neurological status  Outcome: Progressing  Goal: Absence of seizures  Outcome: Progressing  Goal: Remains free of injury related to seizures activity  Outcome: Progressing  Goal: Achieves maximal functionality and self care  Outcome: Progressing     Problem: Respiratory - Adult  Goal: Achieves optimal ventilation and oxygenation  Outcome: Progressing     Problem: Cardiovascular - Adult  Goal: Maintains optimal cardiac output and hemodynamic stability  Outcome: Progressing  Goal: Absence of cardiac dysrhythmias or at baseline  Outcome: Progressing

## 2023-11-24 NOTE — PLAN OF CARE
Problem: Discharge Planning  Goal: Discharge to home or other facility with appropriate resources  11/24/2023 1325 by Franck Mathews RN  Outcome: Adequate for Discharge     Problem: Pain  Goal: Verbalizes/displays adequate comfort level or baseline comfort level  11/24/2023 1325 by Franck Mathews RN  Outcome: Adequate for Discharge     Problem: Safety - Adult  Goal: Free from fall injury  11/24/2023 1325 by Franck Mathews RN  Outcome: Adequate for Discharge     Problem: Chronic Conditions and Co-morbidities  Goal: Patient's chronic conditions and co-morbidity symptoms are monitored and maintained or improved  11/24/2023 1325 by Franck Mathews RN  Outcome: Adequate for Discharge     Problem: Neurosensory - Adult  Goal: Achieves stable or improved neurological status  11/24/2023 1325 by Franck Mathews RN  Outcome: Adequate for Discharge     Problem: Cardiovascular - Adult  Goal: Maintains optimal cardiac output and hemodynamic stability  11/24/2023 1325 by Franck Mathews RN  Outcome: Adequate for Discharge

## 2023-11-24 NOTE — PROGRESS NOTES
Treatment time: *** Hours *** minutes  Net UF: *** ml  Dwell Time: *** minutes {Gained; Lost}    Treatment completed without complications or complaints from patient. Effluent *** and lines taped to patient per protocol. Patient resting comfortably with VSS upon exiting room. Copy of dialysis treatment record placed in chart, to be scanned into EMR and report given to ***, LAURA.

## 2023-11-24 NOTE — CARE COORDINATION
11/24/23 1237   IMM Letter   IMM Letter given to Patient/Family/Significant other/Guardian/POA/by: IMM Letter given to Patient by SW. Patient signed IMM Letter and is in agreement with D/C home.    IMM Letter date given: 11/24/23   IMM Letter time given: 1263         Electronically signed by ROBIN Muse on 11/24/2023 at 12:37 PM

## 2023-11-24 NOTE — CARE COORDINATION
Case Management -  Discharge Note      Patient Name: Yola Stone                   YOB: 1965            Readmission Risk (Low < 19, Mod (19-27), High > 27): Readmission Risk Score: 26.3    Current PCP: Gloria Yang MD    ([facility]) Important Message from Medicare:    Date: 11/24/2023    PT AM-PAC: 23 /24  OT AM-PAC: 19 /24    Home Care Information:   Is patient resuming current home health care services: Yes [facility]  [facility] Amadou Mercer, [address]  Phone: [phone]  Fax: [phone]      Services: PT/OT/Nursing  Home Health Order Obtained: CM asked Dr. Luis Alfredo Pruett to please place the order. He advised he would. Home health agency notified of discharge. Financial    Payor: MEDICARE / Plan: MEDICARE PART A AND B / Product Type: *No Product type* /     Pharmacy:  Potential assistance Purchasing Medications: No  Meds-to-Beds request:        [address] [address] [phone] Adwoa Costello [phone]  [facility] [address]  Phone: [phone] Fax: [phone] [facility] [address] [place] [address]), [address] [place] [address] [phone] Adwoa Costello [phone]  [place] [address] [place] [address]  Phone: [phone] Fax: [phone]      Notes: Additional Case Management Notes: Wife to transport home.          Electronically signed by ROBIN Beltrán on 11/24/2023 at 12:38 PM

## 2023-12-01 ENCOUNTER — OFFICE VISIT (OUTPATIENT)
Dept: INTERNAL MEDICINE CLINIC | Age: 58
End: 2023-12-01
Payer: MEDICARE

## 2023-12-01 VITALS — TEMPERATURE: 98 F

## 2023-12-01 DIAGNOSIS — Z48.89 POSTOPERATIVE VISIT: Primary | ICD-10-CM

## 2023-12-01 DIAGNOSIS — M14.672 CHARCOT'S JOINT OF LEFT FOOT: ICD-10-CM

## 2023-12-01 DIAGNOSIS — L97.522 FOOT ULCERATION, LEFT, WITH FAT LAYER EXPOSED (HCC): ICD-10-CM

## 2023-12-01 PROCEDURE — 99213 OFFICE O/P EST LOW 20 MIN: CPT

## 2023-12-04 NOTE — TELEPHONE ENCOUNTER
Received refill request for Nitroglycerin from [facility]     Last ov:09/12/2023 NPTS    Last Refill:02/15/2023    Next appointment:12/21/2023 AIYANA

## 2023-12-05 RX ORDER — NITROGLYCERIN 0.4 MG/1
TABLET SUBLINGUAL
Qty: 25 TABLET | Refills: 2 | Status: SHIPPED | OUTPATIENT
Start: 2023-12-05

## 2023-12-15 ENCOUNTER — OFFICE VISIT (OUTPATIENT)
Dept: INTERNAL MEDICINE CLINIC | Age: 58
End: 2023-12-15
Payer: MEDICARE

## 2023-12-15 VITALS — TEMPERATURE: 98 F

## 2023-12-15 DIAGNOSIS — M14.672 CHARCOT'S JOINT OF LEFT FOOT: ICD-10-CM

## 2023-12-15 DIAGNOSIS — E11.42 DM TYPE 2 WITH DIABETIC PERIPHERAL NEUROPATHY (HCC): ICD-10-CM

## 2023-12-15 DIAGNOSIS — E10.65 POORLY CONTROLLED TYPE 1 DIABETES MELLITUS (HCC): ICD-10-CM

## 2023-12-15 DIAGNOSIS — L97.522 FOOT ULCERATION, LEFT, WITH FAT LAYER EXPOSED (HCC): Primary | ICD-10-CM

## 2023-12-15 PROBLEM — D69.6 THROMBOCYTOPENIA, UNSPECIFIED (HCC): Status: ACTIVE | Noted: 2023-12-15

## 2023-12-15 PROCEDURE — 11042 DBRDMT SUBQ TIS 1ST 20SQCM/<: CPT

## 2023-12-15 PROCEDURE — 99213 OFFICE O/P EST LOW 20 MIN: CPT

## 2023-12-15 NOTE — PROGRESS NOTES
Department of Podiatry  Resident Progress Note    Name: Jeaneth Marquis  Allergies: Iodides, Shell-fish derived products, and atorvastatin calcium    SUBJECTIVE  The patient is a 66 y.o. male who presents for a post-operative visit and continued wound care. Patient is s/p left foot I&D with graft application and delayed primary closure (DOS 10/23/23). Patient states that yesterday he was active with physical therapy and occupational therapy and noticed increased swelling in his left foot. He states that he does have some minimal pain and rates it a 2 to 4 out of /10. He notes that he has a home health nurse coming once a week to check his dressing and states that he has tried to change the dressing every other day. Patient denies N/V/F/C/SOB/CP. Patient denies all other pedal complaints     Past Medical History:        Diagnosis Date    Angina at rest     Cardiomyopathy [facility])     Carotid artery stenosis 10/25/2016    SUZANNA stented with 8 x 30 mm non-tapered Xact stent    CHF (congestive heart failure) ([address]) 03/03/2015    EF 30%     CKD (chronic kidney disease) stage 2, GFR 60-89 ml/min     Coronary artery disease     sp CABG UC    Diabetic peripheral neuropathy ([facility])     Diastolic HF (heart failure) ([facility])     Hyperlipidemia with target LDL less than 70     Hypertension goal BP (blood pressure) < 130/80     PVD (peripheral vascular disease) ([facility])     Seizures ([address])     in childhood    Type 1 diabetes mellitus with chronic kidney disease ([facility])     c-peptide <0.1 in 2015       REVIEW OF SYSTEMS: A 12 point review of systems is unremarkable with the exception of the chief complaint. Patient specifically denies nausea, vomiting, fever, chills, shortness of breath, chest pain, abdominal pain, constipation, or difficulty urinating. OBJECTIVE  Patient presents with accompanied by his wife with assistance of a cane and a surgical shoe to the left LE. Patient is AOx3 and NAD.     VASCULAR: DP and PT pulses faintly palpable

## 2023-12-22 ENCOUNTER — OFFICE VISIT (OUTPATIENT)
Dept: INTERNAL MEDICINE CLINIC | Age: 58
End: 2023-12-22
Payer: MEDICARE

## 2023-12-22 VITALS — TEMPERATURE: 98.6 F

## 2023-12-22 DIAGNOSIS — M14.672 CHARCOT'S JOINT OF LEFT FOOT: ICD-10-CM

## 2023-12-22 DIAGNOSIS — E10.65 POORLY CONTROLLED TYPE 1 DIABETES MELLITUS (HCC): ICD-10-CM

## 2023-12-22 DIAGNOSIS — L97.522 FOOT ULCERATION, LEFT, WITH FAT LAYER EXPOSED (HCC): Primary | ICD-10-CM

## 2023-12-22 PROCEDURE — 11042 DBRDMT SUBQ TIS 1ST 20SQCM/<: CPT

## 2023-12-22 PROCEDURE — 99213 OFFICE O/P EST LOW 20 MIN: CPT

## 2023-12-26 DIAGNOSIS — E10.22 TYPE 1 DIABETES MELLITUS WITH STAGE 4 CHRONIC KIDNEY DISEASE (HCC): ICD-10-CM

## 2023-12-26 DIAGNOSIS — N18.4 TYPE 1 DIABETES MELLITUS WITH STAGE 4 CHRONIC KIDNEY DISEASE (HCC): ICD-10-CM

## 2023-12-26 RX ORDER — PROCHLORPERAZINE 25 MG/1
SUPPOSITORY RECTAL
Qty: 9 EACH | Refills: 3 | Status: SHIPPED | OUTPATIENT
Start: 2023-12-26

## 2023-12-26 NOTE — PROGRESS NOTES
Department of Podiatry  Resident Progress Note    Name: Alissa Parker  Allergies: Iodides, Shell-fish derived products, and atorvastatin calcium    SUBJECTIVE  The patient is a 66 y.o. male who presents for a post-operative visit and continued wound care. Patient is s/p left foot I&D with graft application and delayed primary closure (DOS 10/23/23). Patient has several complicating health factors and pending surgeries and would like to avoid podiatric surgery if possible. He would like to discuss if a graft would still be necessary. He notes that he has a home health nurse coming once a week to check his dressing and states that he has tried to change the dressing every other day. Patient denies N/V/F/C/SOB/CP. Patient denies all other pedal complaints     Past Medical History:        Diagnosis Date    Angina at rest     Cardiomyopathy [facility])     Carotid artery stenosis 10/25/2016    SUZANNA stented with 8 x 30 mm non-tapered Xact stent    CHF (congestive heart failure) ([address]) 03/03/2015    EF 30%     CKD (chronic kidney disease) stage 2, GFR 60-89 ml/min     Coronary artery disease     sp CABG UC    Diabetic peripheral neuropathy (HCC)     Diastolic HF (heart failure) ([facility])     Hyperlipidemia with target LDL less than 70     Hypertension goal BP (blood pressure) < 130/80     PVD (peripheral vascular disease) ([facility])     Seizures ([address])     in childhood    Type 1 diabetes mellitus with chronic kidney disease (HCC)     c-peptide <0.1 in 2015       REVIEW OF SYSTEMS: A 12 point review of systems is unremarkable with the exception of the chief complaint. Patient specifically denies nausea, vomiting, fever, chills, shortness of breath, chest pain, abdominal pain, constipation, or difficulty urinating. OBJECTIVE  Patient presents with accompanied by his wife with assistance of a cane and a surgical shoe to the left LE. Patient is AOx3 and NAD. VASCULAR: DP and PT pulses faintly palpable +1/4.  CFT is brisk to the digits of

## 2023-12-26 NOTE — TELEPHONE ENCOUNTER
Last OV 08/21/2023  Next OV none on file     Requested Prescriptions     Pending Prescriptions Disp Refills    Continuous Blood Gluc Sensor ([facility]) MISC [Pharmacy Med Name: DEXCOM_G6 SENSOR] 9 each 3     Sig: USE AS DIRECTED ; CHANGE EVERY  10 DAYS   ;

## 2023-12-29 ENCOUNTER — OFFICE VISIT (OUTPATIENT)
Dept: INTERNAL MEDICINE CLINIC | Age: 58
End: 2023-12-29
Payer: MEDICARE

## 2023-12-29 VITALS — TEMPERATURE: 97 F

## 2023-12-29 DIAGNOSIS — M14.672 CHARCOT'S JOINT OF LEFT FOOT: ICD-10-CM

## 2023-12-29 DIAGNOSIS — L97.522 FOOT ULCERATION, LEFT, WITH FAT LAYER EXPOSED (HCC): Primary | ICD-10-CM

## 2023-12-29 DIAGNOSIS — E11.42 DM TYPE 2 WITH DIABETIC PERIPHERAL NEUROPATHY (HCC): ICD-10-CM

## 2023-12-29 PROCEDURE — 99213 OFFICE O/P EST LOW 20 MIN: CPT

## 2023-12-29 PROCEDURE — 11721 DEBRIDE NAIL 6 OR MORE: CPT

## 2023-12-29 NOTE — PROGRESS NOTES
Department of Podiatry  Resident Progress Note    Name: Te Sheridan  Allergies: Iodides, Shell-fish derived products, and atorvastatin calcium    SUBJECTIVE  The patient is a 58 y.o. male who presents for a post-operative visit and continued wound care. Patient is s/p left foot I&D with graft application and delayed primary closure (DOS 10/23/23). Patient has several complicating health factors and pending surgeries and would like to avoid podiatric surgery if possible. He notes that he has a home health nurse coming once a week to check his dressing and states that he has tried to change the dressing every other day. Patient denies N/V/F/C/SOB/CP. Patient denies all other pedal complaints     Past Medical History:        Diagnosis Date    Angina at rest     Cardiomyopathy ([facility])     Carotid artery stenosis 10/25/2016    SUZANNA stented with 8 x 30 mm non-tapered Xact stent    CHF (congestive heart failure) ([facility]) 03/03/2015    EF 30%     CKD (chronic kidney disease) stage 2, GFR 60-89 ml/min     Coronary artery disease     sp CABG UC    Diabetic peripheral neuropathy ([facility])     Diastolic HF (heart failure) ([facility])     Hyperlipidemia with target LDL less than 70     Hypertension goal BP (blood pressure) < 130/80     PVD (peripheral vascular disease) ([facility])     Seizures ([facility])     in childhood    Type 1 diabetes mellitus with chronic kidney disease ([facility])     c-peptide <0.1 in 2015       REVIEW OF SYSTEMS: A 12 point review of systems is unremarkable with the exception of the chief complaint. Patient specifically denies nausea, vomiting, fever, chills, shortness of breath, chest pain, abdominal pain, constipation, or difficulty urinating.      OBJECTIVE  Patient presents with accompanied by his wife with assistance of a cane and a surgical shoe to the left LE. Patient is AOx3 and NAD.    VASCULAR: DP and PT pulses faintly palpable +1/4. CFT is brisk to the digits of the foot b/l. Skin temperature is warm to cool from proximal

## 2024-01-18 ENCOUNTER — OFFICE VISIT (OUTPATIENT)
Dept: CARDIOLOGY CLINIC | Age: 59
End: 2024-01-18
Payer: MEDICARE

## 2024-01-18 VITALS
WEIGHT: 191.2 LBS | OXYGEN SATURATION: 100 % | HEART RATE: 88 BPM | DIASTOLIC BLOOD PRESSURE: 64 MMHG | BODY MASS INDEX: 28.98 KG/M2 | SYSTOLIC BLOOD PRESSURE: 108 MMHG | HEIGHT: 68 IN

## 2024-01-18 DIAGNOSIS — I10 ESSENTIAL HYPERTENSION: ICD-10-CM

## 2024-01-18 DIAGNOSIS — I25.83 CORONARY ARTERY DISEASE DUE TO LIPID RICH PLAQUE: Primary | ICD-10-CM

## 2024-01-18 DIAGNOSIS — I25.10 CORONARY ARTERY DISEASE DUE TO LIPID RICH PLAQUE: Primary | ICD-10-CM

## 2024-01-18 DIAGNOSIS — I25.5 ISCHEMIC CARDIOMYOPATHY: ICD-10-CM

## 2024-01-18 PROCEDURE — 3078F DIAST BP <80 MM HG: CPT | Performed by: INTERNAL MEDICINE

## 2024-01-18 PROCEDURE — 3074F SYST BP LT 130 MM HG: CPT | Performed by: INTERNAL MEDICINE

## 2024-01-18 PROCEDURE — G8427 DOCREV CUR MEDS BY ELIG CLIN: HCPCS | Performed by: INTERNAL MEDICINE

## 2024-01-18 PROCEDURE — G8417 CALC BMI ABV UP PARAM F/U: HCPCS | Performed by: INTERNAL MEDICINE

## 2024-01-18 PROCEDURE — G8484 FLU IMMUNIZE NO ADMIN: HCPCS | Performed by: INTERNAL MEDICINE

## 2024-01-18 PROCEDURE — 1036F TOBACCO NON-USER: CPT | Performed by: INTERNAL MEDICINE

## 2024-01-18 PROCEDURE — 3017F COLORECTAL CA SCREEN DOC REV: CPT | Performed by: INTERNAL MEDICINE

## 2024-01-18 PROCEDURE — 99214 OFFICE O/P EST MOD 30 MIN: CPT | Performed by: INTERNAL MEDICINE

## 2024-01-18 NOTE — PROGRESS NOTES
[facility]  H+P  Consult  OP Visit  FU Visit   CC/HPI   CC Followup visit for cardiac conditions detailed in assessment and plan below.   Intervention PCI, CABG   General Doing well.  No new concerns.     Cardiac Sx -CP, -SOB, -dizziness, -syncope, -edema, -orthopnea, -pnd, -fatigue   HISTORY/ALLERGY/ROS   M/S/S/F Hx Reviewed in Epic and updated as appropriate   ALLERG Iodides, Shellfish-derived products, and Atorvastatin calcium   ROS Full ROS obtained and negative except as mentioned in HPI   MEDICATIONS   Cardiac medications reviewed in [facility] during visit.   PHYSICAL EXAM   Vitals /64 (Site: Right Upper Arm, Position: Sitting, Cuff Size: Medium Adult)   Pulse 88   Ht 1.727 m (5' 7.99\")   Wt 86.7 kg (191 lb 3.2 oz)   SpO2 100%   BMI 29.08 kg/m²    Gen Alert, coop, no distress Heart  RRR, no MRG   Lung CTA-B, unlabored, no DTP Extrem Edema -Grade 0 (0mm)      COMPLIANCE   Discussed and counseled on diet, exercise, weight loss, smoking, alcohol, drugs.  All questions answered.   CODING   SCI (79033) - 30-39 mins spent reviewing hx/tests/consults, performing exam, counseling/educating, ordering meds/tests/procedures, referring/communicating w/PCP/consultants, documenting in EMR, interpreting results, communicating medical information and plan with family.   SCRIBE ATTESTATION   Nurse I, Andria North, RN, am scribing for and in the presence of Chevy Troy MD. 1/17/2024   Doctor Andria North is working as scribe for and in presence of me and may have prepopulated components of note with my historical intellectual property (IP) under my supervision.  Any additions to this IP performed in my presence and at my direction. Content and accuracy of this note reviewed by me (Chevy Troy MD).   ASSESSMENT AND PLAN   *CAD/CM   Date EF Results   Sx   Stable, as detailed above   Hx 2001  S/p CABG   [facility] 4/21  PCI of SVG-OM1 ([place])   MPI 10/23  Abnormal perfusion, fixed defect with minimal reversibility

## 2024-01-26 ENCOUNTER — OFFICE VISIT (OUTPATIENT)
Dept: INTERNAL MEDICINE CLINIC | Age: 59
End: 2024-01-26
Payer: MEDICARE

## 2024-01-26 VITALS — TEMPERATURE: 97.6 F

## 2024-01-26 DIAGNOSIS — M14.672 CHARCOT'S JOINT OF LEFT FOOT: ICD-10-CM

## 2024-01-26 DIAGNOSIS — E11.42 DM TYPE 2 WITH DIABETIC PERIPHERAL NEUROPATHY (HCC): ICD-10-CM

## 2024-01-26 DIAGNOSIS — L97.522 FOOT ULCERATION, LEFT, WITH FAT LAYER EXPOSED (HCC): Primary | ICD-10-CM

## 2024-01-26 PROCEDURE — 11042 DBRDMT SUBQ TIS 1ST 20SQCM/<: CPT

## 2024-01-26 PROCEDURE — 99213 OFFICE O/P EST LOW 20 MIN: CPT | Performed by: STUDENT IN AN ORGANIZED HEALTH CARE EDUCATION/TRAINING PROGRAM

## 2024-01-28 NOTE — PROGRESS NOTES
Department of Podiatry  Resident Progress Note    Name: Te Sheridan  Allergies: Iodides, Shell-fish derived products, and atorvastatin calcium    SUBJECTIVE  The patient is a 58 y.o. male who presents for a post-operative visit and continued wound care. Patient is s/p left foot I&D with graft application and delayed primary closure (DOS 10/23/23). Patient states that his wound is getting smaller. Patient states that he has been changing his dressing daily with antibiotic ointment and a band-aid. Patient states that he has been weight bearing as tolerated in normal shoe gear and has had no difficulties of late. Patient denies any other pedal  Patient denies N/V/F/C/SOB/CP. Patient denies all other pedal complaints     Past Medical History:        Diagnosis Date    Angina at rest     Cardiomyopathy ([facility])     Carotid artery stenosis 10/25/2016    SUZANNA stented with 8 x 30 mm non-tapered Xact stent    CHF (congestive heart failure) ([facility]) 03/03/2015    EF 30%     CKD (chronic kidney disease) stage 2, GFR 60-89 ml/min     Coronary artery disease     sp CABG UC    Diabetic peripheral neuropathy ([facility])     Diastolic HF (heart failure) ([facility])     Hyperlipidemia with target LDL less than 70     Hypertension goal BP (blood pressure) < 130/80     PVD (peripheral vascular disease) ([facility])     Seizures ([facility])     in childhood    Type 1 diabetes mellitus with chronic kidney disease ([facility])     c-peptide <0.1 in 2015       REVIEW OF SYSTEMS: A 12 point review of systems is unremarkable with the exception of the chief complaint. Patient specifically denies nausea, vomiting, fever, chills, shortness of breath, chest pain, abdominal pain, constipation, or difficulty urinating.      OBJECTIVE  Patient presents with assisted with a cane and ambulating in normal shoe gear.Patient is AAOx3 and NAD.    VASCULAR: DP and PT pulses faintly palpable +1/4. CFT is brisk to the digits of the foot b/l. Skin temperature is warm to cool from proximal to

## 2024-02-05 ENCOUNTER — TELEPHONE (OUTPATIENT)
Dept: CARDIOLOGY CLINIC | Age: 59
End: 2024-02-05

## 2024-02-05 NOTE — TELEPHONE ENCOUNTER
Patient feels chest discomfort at night while doing dialysis. Feels fine during the day. Patient cannot lie flat anymore. Wants to know if it has to do with fluid balance possibly?

## 2024-02-05 NOTE — TELEPHONE ENCOUNTER
Unable to leave voicemail (full)  Spoke with KJC, does not sound like the chest discomfort is cardiac. Would recommend discussing with kidney doctor in regards to fluid imbalance, maybe he needs more dialysis? Please attempt to call pt again to relay message. Thank you

## 2024-02-05 NOTE — TELEPHONE ENCOUNTER
Pt called stating for the passed couple days they have been having chest discomfort. Pt stated pain is worse when they lay down and last night 2/4 pt had to sleep sitting up.    Pt did not have any vitals for today however when the did check them it was 130's before medications after medications 110/58 pulse 72.    Pt would like to know what they should do?    Pls advise thank you

## 2024-02-06 ENCOUNTER — TELEPHONE (OUTPATIENT)
Dept: CARDIOLOGY CLINIC | Age: 59
End: 2024-02-06

## 2024-02-06 RX ORDER — SODIUM CHLORIDE 0.9 % (FLUSH) 0.9 %
5-40 SYRINGE (ML) INJECTION PRN
OUTPATIENT
Start: 2024-02-06

## 2024-02-06 RX ORDER — SODIUM CHLORIDE 9 MG/ML
INJECTION, SOLUTION INTRAVENOUS PRN
OUTPATIENT
Start: 2024-02-06

## 2024-02-06 RX ORDER — SODIUM CHLORIDE 0.9 % (FLUSH) 0.9 %
5-40 SYRINGE (ML) INJECTION EVERY 12 HOURS SCHEDULED
OUTPATIENT
Start: 2024-02-06

## 2024-02-06 NOTE — TELEPHONE ENCOUNTER
Pt called stating they need to cancel the surgery/ ICD placement scheduled for 2/7, pt stated they were to talk to PCP about they placement and have not had a chance to do that yet. Pt will call back to RS once they speak to PCP.    Pls advise thank you

## 2024-02-06 NOTE — TELEPHONE ENCOUNTER
Spoke with the pt and cancelled procedure for tomorrow. He still needs to see PCP about a transplant that might be happening. He will call me to reschedule once he has completed that and knows more.

## 2024-02-07 ENCOUNTER — HOSPITAL ENCOUNTER (OUTPATIENT)
Dept: CARDIAC CATH/INVASIVE PROCEDURES | Age: 59
Discharge: HOME | End: 2024-02-07

## 2024-02-09 ENCOUNTER — OFFICE VISIT (OUTPATIENT)
Dept: INTERNAL MEDICINE CLINIC | Age: 59
End: 2024-02-09
Payer: MEDICARE

## 2024-02-09 DIAGNOSIS — M14.672 CHARCOT'S JOINT OF LEFT FOOT: Primary | ICD-10-CM

## 2024-02-09 DIAGNOSIS — E11.42 DM TYPE 2 WITH DIABETIC PERIPHERAL NEUROPATHY (HCC): ICD-10-CM

## 2024-02-09 PROCEDURE — 11055 PARING/CUTG B9 HYPRKER LES 1: CPT

## 2024-02-09 PROCEDURE — 99213 OFFICE O/P EST LOW 20 MIN: CPT

## 2024-02-10 NOTE — PROGRESS NOTES
Department of Podiatry  Resident Progress Note    Name: Te Sheridan  Allergies: Iodides, Shell-fish derived products, and atorvastatin calcium    SUBJECTIVE  The patient is a 58 y.o. male who presents for a post-operative visit and continued wound care. Patient is s/p left foot I&D with graft application and delayed primary closure (DOS 10/23/23). Patient states that his wound is getting smaller. Patient states that he has been changing his dressing daily with antibiotic ointment and a band-aid. Patient states that he has been weight bearing as tolerated in normal shoe gear and has had no difficulties of late. Patient denies any other pedal  Patient denies N/V/F/C/SOB/CP. Patient denies all other pedal complaints     Past Medical History:        Diagnosis Date    Angina at rest     Cardiomyopathy ([facility])     Carotid artery stenosis 10/25/2016    SUZANNA stented with 8 x 30 mm non-tapered Xact stent    CHF (congestive heart failure) ([facility]) 03/03/2015    EF 30%     CKD (chronic kidney disease) stage 2, GFR 60-89 ml/min     Coronary artery disease     sp CABG UC    Diabetic peripheral neuropathy ([facility])     Diastolic HF (heart failure) ([facility])     Hyperlipidemia with target LDL less than 70     Hypertension goal BP (blood pressure) < 130/80     PVD (peripheral vascular disease) ([facility])     Seizures ([facility])     in childhood    Type 1 diabetes mellitus with chronic kidney disease ([facility])     c-peptide <0.1 in 2015       REVIEW OF SYSTEMS: A 12 point review of systems is unremarkable with the exception of the chief complaint. Patient specifically denies nausea, vomiting, fever, chills, shortness of breath, chest pain, abdominal pain, constipation, or difficulty urinating.      OBJECTIVE  Patient presents with assisted with a cane and ambulating in normal shoe gear.Patient is AAOx3 and NAD.    VASCULAR: DP and PT pulses faintly palpable +1/4. CFT is brisk to the digits of the foot b/l. Skin temperature is warm to cool from proximal to

## 2024-02-15 ENCOUNTER — TELEPHONE (OUTPATIENT)
Dept: ENDOCRINOLOGY | Age: 59
End: 2024-02-15

## 2024-02-15 NOTE — TELEPHONE ENCOUNTER
Submitted PA for Dexcom Sensor  Via CM Key: [id number]   STATUS: Approved today  PA Case: [id number], Status: Approved, Coverage Starts on: 2/15/2024 12:00:00 AM, Coverage Ends on: 2/14/2025

## 2024-02-20 ENCOUNTER — TELEPHONE (OUTPATIENT)
Dept: ENDOCRINOLOGY | Age: 59
End: 2024-02-20

## 2024-02-20 DIAGNOSIS — E10.22 TYPE 1 DIABETES MELLITUS WITH STAGE 4 CHRONIC KIDNEY DISEASE (HCC): ICD-10-CM

## 2024-02-20 DIAGNOSIS — N18.4 TYPE 1 DIABETES MELLITUS WITH STAGE 4 CHRONIC KIDNEY DISEASE (HCC): ICD-10-CM

## 2024-02-20 RX ORDER — PROCHLORPERAZINE 25 MG/1
SUPPOSITORY RECTAL
Qty: 9 EACH | Refills: 3 | Status: SHIPPED | OUTPATIENT
Start: 2024-02-20

## 2024-02-20 NOTE — TELEPHONE ENCOUNTER
Requested Prescriptions     Signed Prescriptions Disp Refills    Continuous Blood Gluc Sensor (DEXCOM G6 SENSOR) MISC 9 each 3     Sig: Change sensor every 10 days     Authorizing Provider: DAPHNEY ORO     Ordering User: [id number] [place], [place] - [address] - [place] [phone] - F [phone] [address]  [facility] [address]  Phone: [phone] Fax: [phone]

## 2024-02-20 NOTE — TELEPHONE ENCOUNTER
Please sent refill -      Continuous Blood Gluc Sensor (DEXCOM G6 SENSOR)     [id number] - [place], [place] - [address] - P [phone] - F [phone] [address] [place] [address]  Phone: [phone]  Fax: [phone]     Pt has new insurance with [employer]

## 2024-03-08 ENCOUNTER — OFFICE VISIT (OUTPATIENT)
Age: 59
End: 2024-03-08

## 2024-03-08 VITALS
OXYGEN SATURATION: 98 % | SYSTOLIC BLOOD PRESSURE: 129 MMHG | WEIGHT: 196 LBS | TEMPERATURE: 98 F | BODY MASS INDEX: 30.76 KG/M2 | DIASTOLIC BLOOD PRESSURE: 49 MMHG | HEIGHT: 67 IN | HEART RATE: 69 BPM

## 2024-03-08 DIAGNOSIS — R05.1 ACUTE COUGH: Primary | ICD-10-CM

## 2024-03-08 DIAGNOSIS — R53.83 OTHER FATIGUE: ICD-10-CM

## 2024-03-08 LAB
INFLUENZA A ANTIGEN, POC: NORMAL
INFLUENZA B ANTIGEN, POC: NORMAL
Lab: NORMAL
PERFORMING INSTRUMENT: NORMAL
QC PASS/FAIL: NORMAL
SARS-COV-2, POC: NORMAL

## 2024-03-08 RX ORDER — BENZONATATE 100 MG/1
100 CAPSULE ORAL 3 TIMES DAILY PRN
Qty: 21 CAPSULE | Refills: 0 | Status: SHIPPED | OUTPATIENT
Start: 2024-03-08

## 2024-03-08 ASSESSMENT — ENCOUNTER SYMPTOMS
NAUSEA: 0
COUGH: 1
SORE THROAT: 0
VOMITING: 0
WHEEZING: 0
DIARRHEA: 0
SHORTNESS OF BREATH: 0

## 2024-03-08 NOTE — PROGRESS NOTES
Te Garay (:  1965) is a 58 y.o. male,New patient, here for evaluation of the following chief complaint(s):  Cough (Weakness, fatigue, cough, sometimes productive, pt has underlying conditions (ESRD, CHF). )      ASSESSMENT/PLAN:    ICD-10-CM    1. Acute cough  R05.1 POCT COVID-19, Antigen     POCT Influenza A/B Antigen     benzonatate (TESSALON) 100 MG capsule      2. Other fatigue  R53.83 POCT COVID-19, Antigen     POCT Influenza A/B Antigen           Results for POC orders placed in visit on 24   POCT Influenza A/B Antigen   Result Value Ref Range    Inflenza A Ag neg     Influenza B Ag neg      Covid (-)     Appears to have minor cold.. ( wife had same symptoms several days ago).  do not suspect bronchitis/pneumonia  or CAD/CHF at this time based on exam... exam non focal  No  indication for an antibiotic at this time     Keep hydrated, tylenol  if no contraindications) as needed if pain or fever..  . follow up in 7- days if not better  Return sooner   if symptoms worse/feeling worse or has new symptoms or concerns  Go to ER  if fever/chills, increase shortness of breath, increase congestion or wheezing despite medications, if associated with chest pain, nausea/vomiting, dizzy/ light-headed sensation.        SUBJECTIVE/OBJECTIVE:  Patient presents with:  Cough: Weakness, fatigue, cough, sometimes productive  x 2 days pt has underlying conditions (ESRD, CHF).   No chest pain,          History provided by:  Patient   used: No    Cough  Associated symptoms include chills. Pertinent negatives include no chest pain, ear pain, fever, headaches, myalgias, sore throat, shortness of breath or wheezing.       Vitals:    24 1446   BP: (!) 129/49   Site: Right Upper Arm   Position: Sitting   Cuff Size: Medium Adult   Pulse: 69   Temp: 98 °F (36.7 °C)   TempSrc: Oral   SpO2: 98%   Weight: 88.9 kg (196 lb)   Height: 1.702 m (5' 7\")       Review of Systems   Constitutional:

## 2024-03-08 NOTE — PATIENT INSTRUCTIONS
Keep hydrated, tylenol  if no contraindications) as needed if pain or fever..  . follow up in 7- days if not better  Return sooner   if symptoms worse/feeling worse or has new symptoms or concerns  Go to ER  if fever/chills, increase shortness of breath, increase congestion or wheezing despite medications, if associated with chest pain, nausea/vomiting, dizzy/ light-headed sensation.

## 2024-03-09 ASSESSMENT — ENCOUNTER SYMPTOMS: SINUS PRESSURE: 0

## 2024-04-10 NOTE — FLOWSHEET NOTE
[address] [facility]    Physical Therapy  Cancellation/No-show Note  Patient Name:  Amelie Salazar  :  1965   Date:  2021    Cancelled visits to date: 5  No-shows to date: 0    For today's appointment patient:  [x]  Cancelled 3/17, 3/19, 3/31, ,   []  Rescheduled appointment  []  No-show     Reason given by patient:  []  Patient ill  []  Conflicting appointment  []  No transportation    []  Conflict with work  [x]  No reason given  []  Other:     Comments:      Phone call information:   []  Phone call made today to patient at _ time at number provided:      []  Patient answered, conversation as follows:   **OT contacted pt after his missed visit with them, states today is his birthday and doesn't plan to attend his sessions today. []  Patient did not answer, message left as follows:   []  Phone call not made today  [x]  Phone call not needed - pt contacted us to cancel and provided reason for cancellation.      Electronically signed by:  Adina Craig, PT
I acted within this role throughout the entirety of the procedure performed by the primary surgeon

## 2024-04-15 ENCOUNTER — APPOINTMENT (OUTPATIENT)
Dept: GENERAL RADIOLOGY | Age: 59
DRG: 637 | End: 2024-04-15
Payer: MEDICARE

## 2024-04-15 ENCOUNTER — HOSPITAL ENCOUNTER (INPATIENT)
Age: 59
LOS: 1 days | Discharge: HOME | DRG: 637 | End: 2024-04-17
Attending: EMERGENCY MEDICINE | Admitting: STUDENT IN AN ORGANIZED HEALTH CARE EDUCATION/TRAINING PROGRAM
Payer: MEDICARE

## 2024-04-15 DIAGNOSIS — R07.9 CHEST PAIN, UNSPECIFIED TYPE: Primary | ICD-10-CM

## 2024-04-15 DIAGNOSIS — R79.89 ELEVATED TROPONIN: ICD-10-CM

## 2024-04-15 DIAGNOSIS — R05.1 ACUTE COUGH: ICD-10-CM

## 2024-04-15 DIAGNOSIS — E10.10 DIABETIC KETOACIDOSIS WITHOUT COMA ASSOCIATED WITH TYPE 1 DIABETES MELLITUS (HCC): ICD-10-CM

## 2024-04-15 LAB
ALBUMIN SERPL-MCNC: 3.6 G/DL (ref 3.4–5)
ALBUMIN/GLOB SERPL: 1.1 {RATIO} (ref 1.1–2.2)
ALP SERPL-CCNC: 86 U/L (ref 40–129)
ALT SERPL-CCNC: 7 U/L (ref 10–40)
ANION GAP SERPL CALCULATED.3IONS-SCNC: 23 MMOL/L (ref 3–16)
AST SERPL-CCNC: 16 U/L (ref 15–37)
BASE EXCESS BLDV CALC-SCNC: -6.3 MMOL/L (ref -3–3)
BASOPHILS # BLD: 0 K/UL (ref 0–0.2)
BASOPHILS NFR BLD: 0.5 %
BETA-HYDROXYBUTYRATE: 3.2 MMOL/L (ref 0–0.27)
BILIRUB SERPL-MCNC: 0.4 MG/DL (ref 0–1)
BUN SERPL-MCNC: 55 MG/DL (ref 7–20)
CALCIUM SERPL-MCNC: 8.3 MG/DL (ref 8.3–10.6)
CHLORIDE SERPL-SCNC: 94 MMOL/L (ref 99–110)
CO2 BLDV-SCNC: 42 MMOL/L
CO2 SERPL-SCNC: 14 MMOL/L (ref 21–32)
COHGB MFR BLDV: 2.4 % (ref 0–1.5)
CREAT SERPL-MCNC: 8.1 MG/DL (ref 0.9–1.3)
DEPRECATED RDW RBC AUTO: 16.2 % (ref 12.4–15.4)
DO-HGB MFR BLDV: 8 %
EOSINOPHIL # BLD: 0.2 K/UL (ref 0–0.6)
EOSINOPHIL NFR BLD: 4.1 %
FLUAV RNA RESP QL NAA+PROBE: NOT DETECTED
FLUBV RNA RESP QL NAA+PROBE: NOT DETECTED
GFR SERPLBLD CREATININE-BSD FMLA CKD-EPI: 7 ML/MIN/{1.73_M2}
GLUCOSE BLD-MCNC: 479 MG/DL (ref 70–99)
GLUCOSE BLD-MCNC: 507 MG/DL (ref 70–99)
GLUCOSE SERPL-MCNC: 497 MG/DL (ref 70–99)
HCO3 BLDV-SCNC: 17.6 MMOL/L (ref 23–29)
HCT VFR BLD AUTO: 40.2 % (ref 40.5–52.5)
HGB BLD-MCNC: 13.1 G/DL (ref 13.5–17.5)
LYMPHOCYTES # BLD: 0.6 K/UL (ref 1–5.1)
LYMPHOCYTES NFR BLD: 17.3 %
MCH RBC QN AUTO: 29 PG (ref 26–34)
MCHC RBC AUTO-ENTMCNC: 32.5 G/DL (ref 31–36)
MCV RBC AUTO: 89.2 FL (ref 80–100)
METHGB MFR BLDV: 0.2 %
MONOCYTES # BLD: 0.8 K/UL (ref 0–1.3)
MONOCYTES NFR BLD: 22 %
NEUTROPHILS # BLD: 2.1 K/UL (ref 1.7–7.7)
NEUTROPHILS NFR BLD: 56.1 %
NT-PROBNP SERPL-MCNC: 3002 PG/ML (ref 0–124)
O2 CT VFR BLDV CALC: 17 VOL %
O2 THERAPY: ABNORMAL
PCO2 BLDV: 29.9 MMHG (ref 40–50)
PERFORMED ON: ABNORMAL
PERFORMED ON: ABNORMAL
PH BLDV: 7.38 [PH] (ref 7.35–7.45)
PLATELET # BLD AUTO: 129 K/UL (ref 135–450)
PMV BLD AUTO: 10.2 FL (ref 5–10.5)
PO2 BLDV: 63.7 MMHG (ref 25–40)
POTASSIUM SERPL-SCNC: 4.6 MMOL/L (ref 3.5–5.1)
PROT SERPL-MCNC: 6.9 G/DL (ref 6.4–8.2)
RBC # BLD AUTO: 4.51 M/UL (ref 4.2–5.9)
SAO2 % BLDV: 92 %
SARS-COV-2 RNA RESP QL NAA+PROBE: NOT DETECTED
SODIUM SERPL-SCNC: 131 MMOL/L (ref 136–145)
TROPONIN, HIGH SENSITIVITY: 115 NG/L (ref 0–22)
TROPONIN, HIGH SENSITIVITY: 115 NG/L (ref 0–22)
WBC # BLD AUTO: 3.7 K/UL (ref 4–11)

## 2024-04-15 PROCEDURE — 80053 COMPREHEN METABOLIC PANEL: CPT

## 2024-04-15 PROCEDURE — 83880 ASSAY OF NATRIURETIC PEPTIDE: CPT

## 2024-04-15 PROCEDURE — 84484 ASSAY OF TROPONIN QUANT: CPT

## 2024-04-15 PROCEDURE — 6370000000 HC RX 637 (ALT 250 FOR IP): Performed by: PHYSICIAN ASSISTANT

## 2024-04-15 PROCEDURE — 71045 X-RAY EXAM CHEST 1 VIEW: CPT

## 2024-04-15 PROCEDURE — 93005 ELECTROCARDIOGRAM TRACING: CPT | Performed by: PHYSICIAN ASSISTANT

## 2024-04-15 PROCEDURE — 99285 EMERGENCY DEPT VISIT HI MDM: CPT

## 2024-04-15 PROCEDURE — 2580000003 HC RX 258: Performed by: EMERGENCY MEDICINE

## 2024-04-15 PROCEDURE — 82803 BLOOD GASES ANY COMBINATION: CPT

## 2024-04-15 PROCEDURE — 82010 KETONE BODYS QUAN: CPT

## 2024-04-15 PROCEDURE — 87636 SARSCOV2 & INF A&B AMP PRB: CPT

## 2024-04-15 PROCEDURE — 85025 COMPLETE CBC W/AUTO DIFF WBC: CPT

## 2024-04-15 PROCEDURE — 96365 THER/PROPH/DIAG IV INF INIT: CPT

## 2024-04-15 PROCEDURE — 96372 THER/PROPH/DIAG INJ SC/IM: CPT

## 2024-04-15 PROCEDURE — 36415 COLL VENOUS BLD VENIPUNCTURE: CPT

## 2024-04-15 PROCEDURE — 6370000000 HC RX 637 (ALT 250 FOR IP): Performed by: EMERGENCY MEDICINE

## 2024-04-15 PROCEDURE — 84145 PROCALCITONIN (PCT): CPT

## 2024-04-15 RX ORDER — ASPIRIN 81 MG/1
162 TABLET, CHEWABLE ORAL ONCE
Status: COMPLETED | OUTPATIENT
Start: 2024-04-15 | End: 2024-04-15

## 2024-04-15 RX ORDER — ACETAMINOPHEN 500 MG
1000 TABLET ORAL ONCE
Status: COMPLETED | OUTPATIENT
Start: 2024-04-15 | End: 2024-04-15

## 2024-04-15 RX ORDER — 0.9 % SODIUM CHLORIDE 0.9 %
15 INTRAVENOUS SOLUTION INTRAVENOUS ONCE
Status: CANCELLED | OUTPATIENT
Start: 2024-04-15 | End: 2024-04-15

## 2024-04-15 RX ORDER — DEXTROSE AND SODIUM CHLORIDE 5; .45 G/100ML; G/100ML
INJECTION, SOLUTION INTRAVENOUS CONTINUOUS PRN
Status: CANCELLED | OUTPATIENT
Start: 2024-04-15

## 2024-04-15 RX ORDER — LIDOCAINE 4 G/G
2 PATCH TOPICAL ONCE
Status: COMPLETED | OUTPATIENT
Start: 2024-04-15 | End: 2024-04-16

## 2024-04-15 RX ORDER — SODIUM CHLORIDE 9 MG/ML
INJECTION, SOLUTION INTRAVENOUS CONTINUOUS
Status: CANCELLED | OUTPATIENT
Start: 2024-04-16

## 2024-04-15 RX ORDER — 0.9 % SODIUM CHLORIDE 0.9 %
1000 INTRAVENOUS SOLUTION INTRAVENOUS ONCE
Status: DISCONTINUED | OUTPATIENT
Start: 2024-04-15 | End: 2024-04-16

## 2024-04-15 RX ADMIN — SODIUM CHLORIDE 8 UNITS/HR: 9 INJECTION, SOLUTION INTRAVENOUS at 23:14

## 2024-04-15 RX ADMIN — ACETAMINOPHEN 1000 MG: 500 TABLET ORAL at 23:25

## 2024-04-15 RX ADMIN — ASPIRIN 162 MG: 81 TABLET, CHEWABLE ORAL at 23:25

## 2024-04-15 RX ADMIN — INSULIN HUMAN 8 UNITS: 100 INJECTION, SOLUTION PARENTERAL at 22:07

## 2024-04-15 RX ADMIN — SODIUM CHLORIDE 1000 ML: 9 INJECTION, SOLUTION INTRAVENOUS at 23:25

## 2024-04-15 ASSESSMENT — ENCOUNTER SYMPTOMS
CONSTIPATION: 0
BACK PAIN: 0
NAUSEA: 0
DIARRHEA: 0
ABDOMINAL PAIN: 0
PHOTOPHOBIA: 0
COLOR CHANGE: 0
CHEST TIGHTNESS: 0
VOMITING: 0
COUGH: 1
SHORTNESS OF BREATH: 1

## 2024-04-15 ASSESSMENT — PAIN DESCRIPTION - LOCATION: LOCATION: CHEST

## 2024-04-15 ASSESSMENT — PAIN SCALES - GENERAL: PAINLEVEL_OUTOF10: 2

## 2024-04-15 ASSESSMENT — LIFESTYLE VARIABLES
HOW MANY STANDARD DRINKS CONTAINING ALCOHOL DO YOU HAVE ON A TYPICAL DAY: PATIENT DOES NOT DRINK
HOW OFTEN DO YOU HAVE A DRINK CONTAINING ALCOHOL: NEVER

## 2024-04-15 ASSESSMENT — PAIN - FUNCTIONAL ASSESSMENT: PAIN_FUNCTIONAL_ASSESSMENT: 0-10

## 2024-04-16 PROBLEM — R50.81 FEVER DUE TO COVID-19: Status: RESOLVED | Noted: 2021-12-03 | Resolved: 2024-04-16

## 2024-04-16 PROBLEM — E66.811 CLASS 1 OBESITY DUE TO EXCESS CALORIES WITH BODY MASS INDEX (BMI) OF 30.0 TO 30.9 IN ADULT: Status: RESOLVED | Noted: 2023-10-16 | Resolved: 2024-04-16

## 2024-04-16 PROBLEM — U07.1 FEVER DUE TO COVID-19: Status: RESOLVED | Noted: 2021-12-03 | Resolved: 2024-04-16

## 2024-04-16 PROBLEM — Z86.16 HISTORY OF COVID-19: Status: ACTIVE | Noted: 2024-04-16

## 2024-04-16 PROBLEM — I50.20 HFREF (HEART FAILURE WITH REDUCED EJECTION FRACTION) (HCC): Status: RESOLVED | Noted: 2022-10-01 | Resolved: 2024-04-16

## 2024-04-16 PROBLEM — I87.2 VENOUS INSUFFICIENCY OF BOTH LOWER EXTREMITIES: Status: RESOLVED | Noted: 2018-05-24 | Resolved: 2024-04-16

## 2024-04-16 PROBLEM — E10.69 HYPERLIPIDEMIA DUE TO TYPE 1 DIABETES MELLITUS (HCC): Status: RESOLVED | Noted: 2022-10-01 | Resolved: 2024-04-16

## 2024-04-16 PROBLEM — L97.401 DIABETIC ULCER OF MIDFOOT ASSOCIATED WITH DIABETES MELLITUS DUE TO UNDERLYING CONDITION, LIMITED TO BREAKDOWN OF SKIN (HCC): Status: RESOLVED | Noted: 2023-10-16 | Resolved: 2024-04-16

## 2024-04-16 PROBLEM — G45.9 TIA (TRANSIENT ISCHEMIC ATTACK): Status: RESOLVED | Noted: 2023-07-05 | Resolved: 2024-04-16

## 2024-04-16 PROBLEM — A49.8 PSEUDOMONAS AERUGINOSA INFECTION: Status: RESOLVED | Noted: 2023-07-06 | Resolved: 2024-04-16

## 2024-04-16 PROBLEM — Z87.19 HISTORY OF PERITONITIS: Status: ACTIVE | Noted: 2024-04-16

## 2024-04-16 PROBLEM — T85.71XA DIALYSIS-ASSOCIATED PERITONITIS (HCC): Status: RESOLVED | Noted: 2021-12-07 | Resolved: 2024-04-16

## 2024-04-16 PROBLEM — E10.10 DKA, TYPE 1, NOT AT GOAL (HCC): Status: ACTIVE | Noted: 2024-04-16

## 2024-04-16 PROBLEM — D69.6 THROMBOCYTOPENIA, UNSPECIFIED (HCC): Status: RESOLVED | Noted: 2023-12-15 | Resolved: 2024-04-16

## 2024-04-16 PROBLEM — R29.898 LEFT LEG WEAKNESS: Status: RESOLVED | Noted: 2021-03-01 | Resolved: 2024-04-16

## 2024-04-16 PROBLEM — I47.29 NSVT (NONSUSTAINED VENTRICULAR TACHYCARDIA) (HCC): Status: RESOLVED | Noted: 2019-07-29 | Resolved: 2024-04-16

## 2024-04-16 PROBLEM — G47.10 HYPERSOMNIA: Status: RESOLVED | Noted: 2021-03-24 | Resolved: 2024-04-16

## 2024-04-16 PROBLEM — E08.621 DIABETIC ULCER OF MIDFOOT ASSOCIATED WITH DIABETES MELLITUS DUE TO UNDERLYING CONDITION, LIMITED TO BREAKDOWN OF SKIN (HCC): Status: RESOLVED | Noted: 2023-10-16 | Resolved: 2024-04-16

## 2024-04-16 PROBLEM — R56.9 NEW ONSET SEIZURE (HCC): Status: RESOLVED | Noted: 2023-11-20 | Resolved: 2024-04-16

## 2024-04-16 PROBLEM — N20.0 KIDNEY STONE: Status: RESOLVED | Noted: 2023-08-17 | Resolved: 2024-04-16

## 2024-04-16 PROBLEM — I48.91 A-FIB (HCC): Status: ACTIVE | Noted: 2023-10-16

## 2024-04-16 PROBLEM — E78.5 HYPERLIPIDEMIA DUE TO TYPE 1 DIABETES MELLITUS (HCC): Status: RESOLVED | Noted: 2022-10-01 | Resolved: 2024-04-16

## 2024-04-16 PROBLEM — R10.9 LEFT FLANK PAIN: Status: RESOLVED | Noted: 2023-08-22 | Resolved: 2024-04-16

## 2024-04-16 PROBLEM — I65.21 CAROTID STENOSIS, RIGHT: Status: RESOLVED | Noted: 2022-05-11 | Resolved: 2024-04-16

## 2024-04-16 PROBLEM — I25.5 ISCHEMIC CARDIOMYOPATHY: Status: RESOLVED | Noted: 2018-05-24 | Resolved: 2024-04-16

## 2024-04-16 PROBLEM — I21.4 NSTEMI (NON-ST ELEVATED MYOCARDIAL INFARCTION) (HCC): Status: RESOLVED | Noted: 2018-05-08 | Resolved: 2024-04-16

## 2024-04-16 PROBLEM — D61.818 PANCYTOPENIA (HCC): Status: ACTIVE | Noted: 2024-04-16

## 2024-04-16 PROBLEM — I48.91 ATRIAL FIBRILLATION WITH RAPID VENTRICULAR RESPONSE (HCC): Chronic | Status: RESOLVED | Noted: 2019-07-25 | Resolved: 2024-04-16

## 2024-04-16 PROBLEM — I20.0 UNSTABLE ANGINA (HCC): Status: RESOLVED | Noted: 2021-04-08 | Resolved: 2024-04-16

## 2024-04-16 PROBLEM — R94.39 ABNORMAL CARDIOVASCULAR STRESS TEST: Status: RESOLVED | Noted: 2017-07-06 | Resolved: 2024-04-16

## 2024-04-16 PROBLEM — R10.9 FLANK PAIN: Status: RESOLVED | Noted: 2023-08-24 | Resolved: 2024-04-16

## 2024-04-16 PROBLEM — R06.83 SNORING: Status: RESOLVED | Noted: 2021-03-24 | Resolved: 2024-04-16

## 2024-04-16 PROBLEM — L08.9 LEFT FOOT INFECTION: Status: RESOLVED | Noted: 2023-10-16 | Resolved: 2024-04-16

## 2024-04-16 PROBLEM — A41.9 SEPSIS (HCC): Status: RESOLVED | Noted: 2021-12-07 | Resolved: 2024-04-16

## 2024-04-16 PROBLEM — Z71.3 WEIGHT LOSS COUNSELING, ENCOUNTER FOR: Status: RESOLVED | Noted: 2023-07-05 | Resolved: 2024-04-16

## 2024-04-16 PROBLEM — G93.41 ACUTE METABOLIC ENCEPHALOPATHY: Status: RESOLVED | Noted: 2023-07-04 | Resolved: 2024-04-16

## 2024-04-16 PROBLEM — E66.09 CLASS 1 OBESITY DUE TO EXCESS CALORIES WITH BODY MASS INDEX (BMI) OF 30.0 TO 30.9 IN ADULT: Status: RESOLVED | Noted: 2023-10-16 | Resolved: 2024-04-16

## 2024-04-16 PROBLEM — F33.2 MAJOR DEPRESSIVE DISORDER, RECURRENT SEVERE WITHOUT PSYCHOTIC FEATURES (HCC): Status: RESOLVED | Noted: 2023-11-20 | Resolved: 2024-04-16

## 2024-04-16 PROBLEM — N18.6 ESRD (END STAGE RENAL DISEASE) (HCC): Status: RESOLVED | Noted: 2021-04-08 | Resolved: 2024-04-16

## 2024-04-16 PROBLEM — G93.40 ACUTE ENCEPHALOPATHY: Status: RESOLVED | Noted: 2018-05-06 | Resolved: 2024-04-16

## 2024-04-16 LAB
ALBUMIN SERPL-MCNC: 3.4 G/DL (ref 3.4–5)
ALBUMIN/GLOB SERPL: 1.1 {RATIO} (ref 1.1–2.2)
ALP SERPL-CCNC: 69 U/L (ref 40–129)
ALT SERPL-CCNC: 10 U/L (ref 10–40)
ANION GAP SERPL CALCULATED.3IONS-SCNC: 14 MMOL/L (ref 3–16)
ANION GAP SERPL CALCULATED.3IONS-SCNC: 21 MMOL/L (ref 3–16)
AST SERPL-CCNC: 23 U/L (ref 15–37)
BACTERIA URNS QL MICRO: NORMAL /HPF
BASOPHILS # BLD: 0 K/UL (ref 0–0.2)
BASOPHILS NFR BLD: 0.3 %
BILIRUB SERPL-MCNC: 0.3 MG/DL (ref 0–1)
BILIRUB UR QL STRIP.AUTO: NEGATIVE
BUN SERPL-MCNC: 58 MG/DL (ref 7–20)
BUN SERPL-MCNC: 58 MG/DL (ref 7–20)
CALCIUM SERPL-MCNC: 8 MG/DL (ref 8.3–10.6)
CALCIUM SERPL-MCNC: 8.1 MG/DL (ref 8.3–10.6)
CHLORIDE SERPL-SCNC: 102 MMOL/L (ref 99–110)
CHLORIDE SERPL-SCNC: 97 MMOL/L (ref 99–110)
CLARITY UR: CLEAR
CO2 SERPL-SCNC: 15 MMOL/L (ref 21–32)
CO2 SERPL-SCNC: 23 MMOL/L (ref 21–32)
COLOR UR: YELLOW
CREAT SERPL-MCNC: 8.3 MG/DL (ref 0.9–1.3)
CREAT SERPL-MCNC: 8.7 MG/DL (ref 0.9–1.3)
DEPRECATED RDW RBC AUTO: 16.4 % (ref 12.4–15.4)
EKG ATRIAL RATE: 82 BPM
EKG DIAGNOSIS: NORMAL
EKG P AXIS: 77 DEGREES
EKG P-R INTERVAL: 150 MS
EKG Q-T INTERVAL: 418 MS
EKG QRS DURATION: 102 MS
EKG QTC CALCULATION (BAZETT): 488 MS
EKG R AXIS: 108 DEGREES
EKG T AXIS: -2 DEGREES
EKG VENTRICULAR RATE: 82 BPM
EOSINOPHIL # BLD: 0 K/UL (ref 0–0.6)
EOSINOPHIL NFR BLD: 0.2 %
EPI CELLS #/AREA URNS AUTO: 1 /HPF (ref 0–5)
EST. AVERAGE GLUCOSE BLD GHB EST-MCNC: 211.6 MG/DL
GFR SERPLBLD CREATININE-BSD FMLA CKD-EPI: 7 ML/MIN/{1.73_M2}
GFR SERPLBLD CREATININE-BSD FMLA CKD-EPI: 7 ML/MIN/{1.73_M2}
GLUCOSE BLD-MCNC: 116 MG/DL (ref 70–99)
GLUCOSE BLD-MCNC: 146 MG/DL (ref 70–99)
GLUCOSE BLD-MCNC: 147 MG/DL (ref 70–99)
GLUCOSE BLD-MCNC: 154 MG/DL (ref 70–99)
GLUCOSE BLD-MCNC: 161 MG/DL (ref 70–99)
GLUCOSE BLD-MCNC: 180 MG/DL (ref 70–99)
GLUCOSE BLD-MCNC: 231 MG/DL (ref 70–99)
GLUCOSE BLD-MCNC: 280 MG/DL (ref 70–99)
GLUCOSE BLD-MCNC: 287 MG/DL (ref 70–99)
GLUCOSE BLD-MCNC: 305 MG/DL (ref 70–99)
GLUCOSE BLD-MCNC: 333 MG/DL (ref 70–99)
GLUCOSE BLD-MCNC: 389 MG/DL (ref 70–99)
GLUCOSE BLD-MCNC: 76 MG/DL (ref 70–99)
GLUCOSE BLD-MCNC: 76 MG/DL (ref 70–99)
GLUCOSE BLD-MCNC: 84 MG/DL (ref 70–99)
GLUCOSE SERPL-MCNC: 239 MG/DL (ref 70–99)
GLUCOSE SERPL-MCNC: 99 MG/DL (ref 70–99)
GLUCOSE UR STRIP.AUTO-MCNC: >=1000 MG/DL
HBA1C MFR BLD: 9 %
HCT VFR BLD AUTO: 38.8 % (ref 40.5–52.5)
HGB BLD-MCNC: 12.7 G/DL (ref 13.5–17.5)
HGB UR QL STRIP.AUTO: ABNORMAL
HYALINE CASTS #/AREA URNS AUTO: 1 /LPF (ref 0–8)
KETONES UR STRIP.AUTO-MCNC: 15 MG/DL
LEUKOCYTE ESTERASE UR QL STRIP.AUTO: NEGATIVE
LYMPHOCYTES # BLD: 0.7 K/UL (ref 1–5.1)
LYMPHOCYTES NFR BLD: 7 %
MAGNESIUM SERPL-MCNC: 1.9 MG/DL (ref 1.8–2.4)
MAGNESIUM SERPL-MCNC: 1.9 MG/DL (ref 1.8–2.4)
MCH RBC QN AUTO: 29 PG (ref 26–34)
MCHC RBC AUTO-ENTMCNC: 32.7 G/DL (ref 31–36)
MCV RBC AUTO: 88.6 FL (ref 80–100)
MONOCYTES # BLD: 0.8 K/UL (ref 0–1.3)
MONOCYTES NFR BLD: 8.2 %
NEUTROPHILS # BLD: 7.9 K/UL (ref 1.7–7.7)
NEUTROPHILS NFR BLD: 84.3 %
NITRITE UR QL STRIP.AUTO: NEGATIVE
PERFORMED ON: ABNORMAL
PERFORMED ON: NORMAL
PH UR STRIP.AUTO: 5.5 [PH] (ref 5–8)
PHOSPHATE SERPL-MCNC: 2.6 MG/DL (ref 2.5–4.9)
PLATELET # BLD AUTO: 130 K/UL (ref 135–450)
PMV BLD AUTO: 10.1 FL (ref 5–10.5)
POTASSIUM SERPL-SCNC: 3.9 MMOL/L (ref 3.5–5.1)
POTASSIUM SERPL-SCNC: 5.1 MMOL/L (ref 3.5–5.1)
PROCALCITONIN SERPL IA-MCNC: 0.32 NG/ML (ref 0–0.15)
PROT SERPL-MCNC: 6.6 G/DL (ref 6.4–8.2)
PROT UR STRIP.AUTO-MCNC: 100 MG/DL
RBC # BLD AUTO: 4.38 M/UL (ref 4.2–5.9)
RBC CLUMPS #/AREA URNS AUTO: 4 /HPF (ref 0–4)
SODIUM SERPL-SCNC: 133 MMOL/L (ref 136–145)
SODIUM SERPL-SCNC: 139 MMOL/L (ref 136–145)
SP GR UR STRIP.AUTO: 1.01 (ref 1–1.03)
UA COMPLETE W REFLEX CULTURE PNL UR: ABNORMAL
UA DIPSTICK W REFLEX MICRO PNL UR: YES
URN SPEC COLLECT METH UR: ABNORMAL
UROBILINOGEN UR STRIP-ACNC: 0.2 E.U./DL
WBC # BLD AUTO: 9.4 K/UL (ref 4–11)
WBC #/AREA URNS AUTO: 3 /HPF (ref 0–5)

## 2024-04-16 PROCEDURE — 84100 ASSAY OF PHOSPHORUS: CPT

## 2024-04-16 PROCEDURE — 83735 ASSAY OF MAGNESIUM: CPT

## 2024-04-16 PROCEDURE — 80053 COMPREHEN METABOLIC PANEL: CPT

## 2024-04-16 PROCEDURE — 6370000000 HC RX 637 (ALT 250 FOR IP): Performed by: STUDENT IN AN ORGANIZED HEALTH CARE EDUCATION/TRAINING PROGRAM

## 2024-04-16 PROCEDURE — 81001 URINALYSIS AUTO W/SCOPE: CPT

## 2024-04-16 PROCEDURE — 6370000000 HC RX 637 (ALT 250 FOR IP): Performed by: INTERNAL MEDICINE

## 2024-04-16 PROCEDURE — 3E1M39Z IRRIGATION OF PERITONEAL CAVITY USING DIALYSATE, PERCUTANEOUS APPROACH: ICD-10-PCS | Performed by: HOSPITALIST

## 2024-04-16 PROCEDURE — 83036 HEMOGLOBIN GLYCOSYLATED A1C: CPT

## 2024-04-16 PROCEDURE — 2500000003 HC RX 250 WO HCPCS: Performed by: STUDENT IN AN ORGANIZED HEALTH CARE EDUCATION/TRAINING PROGRAM

## 2024-04-16 PROCEDURE — 2580000003 HC RX 258: Performed by: STUDENT IN AN ORGANIZED HEALTH CARE EDUCATION/TRAINING PROGRAM

## 2024-04-16 PROCEDURE — 90945 DIALYSIS ONE EVALUATION: CPT

## 2024-04-16 PROCEDURE — 2580000003 HC RX 258: Performed by: HOSPITALIST

## 2024-04-16 PROCEDURE — 36415 COLL VENOUS BLD VENIPUNCTURE: CPT

## 2024-04-16 PROCEDURE — 6360000002 HC RX W HCPCS: Performed by: STUDENT IN AN ORGANIZED HEALTH CARE EDUCATION/TRAINING PROGRAM

## 2024-04-16 PROCEDURE — 2580000003 HC RX 258: Performed by: EMERGENCY MEDICINE

## 2024-04-16 PROCEDURE — 96375 TX/PRO/DX INJ NEW DRUG ADDON: CPT

## 2024-04-16 PROCEDURE — 87040 BLOOD CULTURE FOR BACTERIA: CPT

## 2024-04-16 PROCEDURE — 85025 COMPLETE CBC W/AUTO DIFF WBC: CPT

## 2024-04-16 PROCEDURE — 93010 ELECTROCARDIOGRAM REPORT: CPT | Performed by: INTERNAL MEDICINE

## 2024-04-16 PROCEDURE — 2000000000 HC ICU R&B

## 2024-04-16 PROCEDURE — 6370000000 HC RX 637 (ALT 250 FOR IP): Performed by: EMERGENCY MEDICINE

## 2024-04-16 RX ORDER — CARVEDILOL 25 MG/1
25 TABLET ORAL 2 TIMES DAILY
Status: DISCONTINUED | OUTPATIENT
Start: 2024-04-16 | End: 2024-04-17 | Stop reason: HOSPADM

## 2024-04-16 RX ORDER — INSULIN LISPRO 100 [IU]/ML
0-4 INJECTION, SOLUTION INTRAVENOUS; SUBCUTANEOUS NIGHTLY
Status: DISCONTINUED | OUTPATIENT
Start: 2024-04-16 | End: 2024-04-16 | Stop reason: ALTCHOICE

## 2024-04-16 RX ORDER — INSULIN GLARGINE 100 [IU]/ML
15 INJECTION, SOLUTION SUBCUTANEOUS NIGHTLY
Status: DISCONTINUED | OUTPATIENT
Start: 2024-04-16 | End: 2024-04-16 | Stop reason: ALTCHOICE

## 2024-04-16 RX ORDER — POTASSIUM CHLORIDE 7.45 MG/ML
10 INJECTION INTRAVENOUS PRN
Status: DISCONTINUED | OUTPATIENT
Start: 2024-04-16 | End: 2024-04-17 | Stop reason: ALTCHOICE

## 2024-04-16 RX ORDER — DEXTROSE AND SODIUM CHLORIDE 5; .45 G/100ML; G/100ML
INJECTION, SOLUTION INTRAVENOUS CONTINUOUS PRN
Status: DISCONTINUED | OUTPATIENT
Start: 2024-04-16 | End: 2024-04-17 | Stop reason: HOSPADM

## 2024-04-16 RX ORDER — GLUCAGON 1 MG/ML
1 KIT INJECTION PRN
Status: DISCONTINUED | OUTPATIENT
Start: 2024-04-16 | End: 2024-04-17 | Stop reason: HOSPADM

## 2024-04-16 RX ORDER — FUROSEMIDE 40 MG/1
40 TABLET ORAL 2 TIMES DAILY
Status: DISCONTINUED | OUTPATIENT
Start: 2024-04-16 | End: 2024-04-17 | Stop reason: HOSPADM

## 2024-04-16 RX ORDER — 0.9 % SODIUM CHLORIDE 0.9 %
15 INTRAVENOUS SOLUTION INTRAVENOUS ONCE
Status: COMPLETED | OUTPATIENT
Start: 2024-04-17 | End: 2024-04-17

## 2024-04-16 RX ORDER — LACTULOSE 10 G/15ML
30 SOLUTION ORAL 3 TIMES DAILY PRN
Status: DISCONTINUED | OUTPATIENT
Start: 2024-04-16 | End: 2024-04-17 | Stop reason: HOSPADM

## 2024-04-16 RX ORDER — LEVETIRACETAM 500 MG/1
500 TABLET ORAL 2 TIMES DAILY
Status: DISCONTINUED | OUTPATIENT
Start: 2024-04-16 | End: 2024-04-17 | Stop reason: HOSPADM

## 2024-04-16 RX ORDER — MAGNESIUM SULFATE IN WATER 40 MG/ML
2000 INJECTION, SOLUTION INTRAVENOUS PRN
Status: DISCONTINUED | OUTPATIENT
Start: 2024-04-16 | End: 2024-04-16

## 2024-04-16 RX ORDER — METOCLOPRAMIDE 10 MG/1
5 TABLET ORAL 3 TIMES DAILY PRN
Status: DISCONTINUED | OUTPATIENT
Start: 2024-04-16 | End: 2024-04-17 | Stop reason: HOSPADM

## 2024-04-16 RX ORDER — MAGNESIUM SULFATE IN WATER 40 MG/ML
2000 INJECTION, SOLUTION INTRAVENOUS PRN
Status: DISCONTINUED | OUTPATIENT
Start: 2024-04-16 | End: 2024-04-17

## 2024-04-16 RX ORDER — HEPARIN SODIUM 5000 [USP'U]/ML
5000 INJECTION, SOLUTION INTRAVENOUS; SUBCUTANEOUS EVERY 8 HOURS SCHEDULED
Status: DISCONTINUED | OUTPATIENT
Start: 2024-04-16 | End: 2024-04-17 | Stop reason: HOSPADM

## 2024-04-16 RX ORDER — SODIUM CHLORIDE, SODIUM LACTATE, CALCIUM CHLORIDE, MAGNESIUM CHLORIDE AND DEXTROSE 1.5; 538; 448; 18.3; 5.08 G/100ML; MG/100ML; MG/100ML; MG/100ML; MG/100ML
2500 INJECTION, SOLUTION INTRAPERITONEAL NIGHTLY
Status: DISCONTINUED | OUTPATIENT
Start: 2024-04-16 | End: 2024-04-17 | Stop reason: HOSPADM

## 2024-04-16 RX ORDER — CALCITRIOL 0.25 UG/1
0.25 CAPSULE, LIQUID FILLED ORAL DAILY
Status: DISCONTINUED | OUTPATIENT
Start: 2024-04-16 | End: 2024-04-17 | Stop reason: HOSPADM

## 2024-04-16 RX ORDER — EZETIMIBE 10 MG/1
10 TABLET ORAL DAILY
Status: DISCONTINUED | OUTPATIENT
Start: 2024-04-16 | End: 2024-04-16 | Stop reason: RX

## 2024-04-16 RX ORDER — ROSUVASTATIN CALCIUM 10 MG/1
10 TABLET, COATED ORAL NIGHTLY
Status: DISCONTINUED | OUTPATIENT
Start: 2024-04-16 | End: 2024-04-17 | Stop reason: HOSPADM

## 2024-04-16 RX ORDER — CALCIUM CARBONATE 500 MG/1
500 TABLET, CHEWABLE ORAL 3 TIMES DAILY PRN
Status: DISCONTINUED | OUTPATIENT
Start: 2024-04-16 | End: 2024-04-17 | Stop reason: HOSPADM

## 2024-04-16 RX ORDER — POLYETHYLENE GLYCOL 3350 17 G/17G
17 POWDER, FOR SOLUTION ORAL DAILY PRN
Status: DISCONTINUED | OUTPATIENT
Start: 2024-04-16 | End: 2024-04-17 | Stop reason: HOSPADM

## 2024-04-16 RX ORDER — DEXTROSE AND SODIUM CHLORIDE 5; .45 G/100ML; G/100ML
INJECTION, SOLUTION INTRAVENOUS CONTINUOUS
Status: DISCONTINUED | OUTPATIENT
Start: 2024-04-16 | End: 2024-04-16

## 2024-04-16 RX ORDER — RANOLAZINE 500 MG/1
500 TABLET, EXTENDED RELEASE ORAL 2 TIMES DAILY
Status: DISCONTINUED | OUTPATIENT
Start: 2024-04-16 | End: 2024-04-17 | Stop reason: HOSPADM

## 2024-04-16 RX ORDER — POTASSIUM CHLORIDE 7.45 MG/ML
10 INJECTION INTRAVENOUS PRN
Status: DISCONTINUED | OUTPATIENT
Start: 2024-04-16 | End: 2024-04-17 | Stop reason: HOSPADM

## 2024-04-16 RX ORDER — ISOSORBIDE MONONITRATE 30 MG/1
30 TABLET, EXTENDED RELEASE ORAL DAILY
Status: DISCONTINUED | OUTPATIENT
Start: 2024-04-16 | End: 2024-04-17 | Stop reason: HOSPADM

## 2024-04-16 RX ORDER — ONDANSETRON 2 MG/ML
4 INJECTION INTRAMUSCULAR; INTRAVENOUS EVERY 6 HOURS PRN
Status: DISCONTINUED | OUTPATIENT
Start: 2024-04-16 | End: 2024-04-17 | Stop reason: HOSPADM

## 2024-04-16 RX ORDER — INSULIN LISPRO 100 [IU]/ML
0-8 INJECTION, SOLUTION INTRAVENOUS; SUBCUTANEOUS
Status: DISCONTINUED | OUTPATIENT
Start: 2024-04-16 | End: 2024-04-16 | Stop reason: ALTCHOICE

## 2024-04-16 RX ORDER — GUAIFENESIN/DEXTROMETHORPHAN 100-10MG/5
5 SYRUP ORAL EVERY 4 HOURS PRN
Status: DISCONTINUED | OUTPATIENT
Start: 2024-04-16 | End: 2024-04-17 | Stop reason: HOSPADM

## 2024-04-16 RX ORDER — DEXTROSE MONOHYDRATE 100 MG/ML
INJECTION, SOLUTION INTRAVENOUS CONTINUOUS PRN
Status: DISCONTINUED | OUTPATIENT
Start: 2024-04-16 | End: 2024-04-17 | Stop reason: HOSPADM

## 2024-04-16 RX ORDER — CLOPIDOGREL BISULFATE 75 MG/1
75 TABLET ORAL DAILY
Status: DISCONTINUED | OUTPATIENT
Start: 2024-04-16 | End: 2024-04-17 | Stop reason: HOSPADM

## 2024-04-16 RX ORDER — SODIUM CHLORIDE 9 MG/ML
INJECTION, SOLUTION INTRAVENOUS CONTINUOUS
Status: DISCONTINUED | OUTPATIENT
Start: 2024-04-17 | End: 2024-04-17 | Stop reason: HOSPADM

## 2024-04-16 RX ADMIN — SACUBITRIL AND VALSARTAN 1 TABLET: 49; 51 TABLET, FILM COATED ORAL at 10:09

## 2024-04-16 RX ADMIN — ROSUVASTATIN CALCIUM 10 MG: 10 TABLET, FILM COATED ORAL at 22:32

## 2024-04-16 RX ADMIN — RANOLAZINE 500 MG: 500 TABLET, EXTENDED RELEASE ORAL at 22:33

## 2024-04-16 RX ADMIN — HEPARIN SODIUM 5000 UNITS: 5000 INJECTION INTRAVENOUS; SUBCUTANEOUS at 22:32

## 2024-04-16 RX ADMIN — ANTACID TABLETS 500 MG: 500 TABLET, CHEWABLE ORAL at 21:53

## 2024-04-16 RX ADMIN — GUAIFENESIN SYRUP AND DEXTROMETHORPHAN 5 ML: 100; 10 SYRUP ORAL at 03:01

## 2024-04-16 RX ADMIN — SERTRALINE HYDROCHLORIDE 50 MG: 50 TABLET ORAL at 10:09

## 2024-04-16 RX ADMIN — ISOSORBIDE MONONITRATE 30 MG: 30 TABLET, EXTENDED RELEASE ORAL at 10:10

## 2024-04-16 RX ADMIN — RANOLAZINE 500 MG: 500 TABLET, EXTENDED RELEASE ORAL at 10:10

## 2024-04-16 RX ADMIN — SODIUM BICARBONATE 50 MEQ: 84 INJECTION, SOLUTION INTRAVENOUS at 00:23

## 2024-04-16 RX ADMIN — ANTACID TABLETS 500 MG: 500 TABLET, CHEWABLE ORAL at 18:28

## 2024-04-16 RX ADMIN — LEVETIRACETAM 500 MG: 500 TABLET, FILM COATED ORAL at 22:32

## 2024-04-16 RX ADMIN — SACUBITRIL AND VALSARTAN 1 TABLET: 49; 51 TABLET, FILM COATED ORAL at 22:33

## 2024-04-16 RX ADMIN — ONDANSETRON 4 MG: 2 INJECTION INTRAMUSCULAR; INTRAVENOUS at 19:42

## 2024-04-16 RX ADMIN — CARVEDILOL 25 MG: 25 TABLET, FILM COATED ORAL at 22:33

## 2024-04-16 RX ADMIN — LEVETIRACETAM 500 MG: 500 TABLET, FILM COATED ORAL at 10:10

## 2024-04-16 RX ADMIN — DEXTROSE AND SODIUM CHLORIDE: 5; 450 INJECTION, SOLUTION INTRAVENOUS at 08:02

## 2024-04-16 RX ADMIN — CARVEDILOL 25 MG: 25 TABLET, FILM COATED ORAL at 10:09

## 2024-04-16 RX ADMIN — CLOPIDOGREL BISULFATE 75 MG: 75 TABLET ORAL at 10:09

## 2024-04-16 RX ADMIN — FUROSEMIDE 40 MG: 40 TABLET ORAL at 10:09

## 2024-04-16 RX ADMIN — SODIUM CHLORIDE 3.29 UNITS/HR: 9 INJECTION, SOLUTION INTRAVENOUS at 00:22

## 2024-04-16 RX ADMIN — HEPARIN SODIUM 5000 UNITS: 5000 INJECTION INTRAVENOUS; SUBCUTANEOUS at 14:56

## 2024-04-16 RX ADMIN — ONDANSETRON 4 MG: 2 INJECTION INTRAMUSCULAR; INTRAVENOUS at 07:55

## 2024-04-16 RX ADMIN — GUAIFENESIN SYRUP AND DEXTROMETHORPHAN 5 ML: 100; 10 SYRUP ORAL at 23:35

## 2024-04-16 RX ADMIN — CALCITRIOL CAPSULES 0.25 MCG 0.25 MCG: 0.25 CAPSULE ORAL at 10:09

## 2024-04-16 RX ADMIN — DEXTROSE MONOHYDRATE, SODIUM CHLORIDE, SODIUM LACTATE, CALCIUM CHLORIDE, MAGNESIUM CHLORIDE 2500 ML: 1.5; 538; 448; 18.4; 5.08 SOLUTION INTRAPERITONEAL at 22:35

## 2024-04-16 RX ADMIN — HEPARIN SODIUM 5000 UNITS: 5000 INJECTION INTRAVENOUS; SUBCUTANEOUS at 06:31

## 2024-04-16 RX ADMIN — SODIUM CHLORIDE 1.14 UNITS/HR: 9 INJECTION, SOLUTION INTRAVENOUS at 01:31

## 2024-04-16 ASSESSMENT — ENCOUNTER SYMPTOMS
COUGH: 1
ALLERGIC/IMMUNOLOGIC NEGATIVE: 1
GASTROINTESTINAL NEGATIVE: 1
EYES NEGATIVE: 1

## 2024-04-16 ASSESSMENT — PAIN SCALES - GENERAL
PAINLEVEL_OUTOF10: 8
PAINLEVEL_OUTOF10: 3

## 2024-04-16 ASSESSMENT — PAIN DESCRIPTION - LOCATION: LOCATION: ABDOMEN;CHEST

## 2024-04-16 NOTE — PROGRESS NOTES
Pt admitted to CVU 2910 from ED, arriving via stretcher. Pt ambulated to bed from stretcher with assistance of RN. Insulin gtt and NS infusing. VSS, assessment complete. Pt stated insulin pump was disconnected in ED. Pt oriented to room, POC discussed, and all questions answered. Bed alarm on and call light within reach.

## 2024-04-16 NOTE — PROGRESS NOTES
Continuous fluid not ordered. Spoke with Hospitalist who confirmed no order for continuous fluid due to CHF and PD.

## 2024-04-16 NOTE — PROGRESS NOTES
[facility]PD Order   Exchanges: 3   Exchange Volume: 2500 ml   Total Time: 9 hrs   Dextrose: 1.5%   Total Volume: 7500 ml     Orders verified. Supplies taken to pt's room. Report received. Cycler set up, primed and pre tested. Dressing changed on [facility] site. Pt connected to cycler. CCPD initiated without problem. Initial effluent clear.       If problems should arise please call the [phone] number on top of PD cycler machine.

## 2024-04-16 NOTE — CONSULTS
[facility]   Nephrology progress Note  [phone]  [phone]   [place]         Reason for Consult:  ESRD    HISTORY OF PRESENT ILLNESS:      The patient is a 59 y.o. male with significant past medical history of diabetes, ESRD, cardiomyopathy who presents with DKA. On insulin drip per protocol. Didn't get PD last night. Reports coughing but no shortness of breath or chest pain. Nephrology is consulted for ESRD management.   On CCPD at home. Follows with Dr. Tam Allen    Past Medical History:        Diagnosis Date    Angina at rest ([facility])     Cardiomyopathy ([facility])     Carotid artery stenosis 10/25/2016    SUZANNA stented with 8 x 30 mm non-tapered Xact stent    CHF (congestive heart failure) ([facility]) 03/03/2015    EF 30%     CKD (chronic kidney disease) stage 2, GFR 60-89 ml/min     Coronary artery disease     sp CABG     Diabetic peripheral neuropathy ([facility])     Diastolic HF (heart failure) ([facility])     Hyperlipidemia with target LDL less than 70     Hypertension goal BP (blood pressure) < 130/80     PVD (peripheral vascular disease) ([facility])     Seizures ([facility])     in childhood    Type 1 diabetes mellitus with chronic kidney disease ([facility])     c-peptide <0.1 in 2015       Past Surgical History:        Procedure Laterality Date    BLADDER SURGERY Left 08/24/2023    CYSTOSCOPY, LEFT URETEROSCOPY, STONE BASKET MANIPULATION, PLACEMENT OF LEFT URETERAL STENT performed by Chevy Sepulveda MD at [facility] OR    CARDIAC CATHETERIZATION      CARDIAC SURGERY  2001    triple bypass at     CAROTID STENT PLACEMENT Right     CORONARY ARTERY BYPASS GRAFT      FOOT DEBRIDEMENT Left 10/16/2023    LEFT FOOT DEBRIDEMENT INCISION AND DRAINAGE WITH BONE BIOPSY performed by David Lowe DPM at [facility] OR    FOOT DEBRIDEMENT Left 10/23/2023    LEFT FOOT INCISION AND DRAINAGE WITH DELAYED PRIMARY CLOSURE AND DERMAL GRAFT APPLICATION performed by David Lowe DPM at [facility] OR    HERNIA REPAIR       TIMES  DAILY WITH EVERY MEAL AS  DIRECTED (Patient taking differently: Take 1 tablet by mouth 3 times daily as needed) 30 tablet 0    Continuous Blood Gluc Transmit (DEXCOM G6 TRANSMITTER) MISC Change every 90 days 1 each 1    clopidogrel (PLAVIX) 75 MG tablet Take 1 tablet by mouth daily 90 tablet 2    ezetimibe (ZETIA) 10 MG tablet TAKE ONE TABLET BY MOUTH DAILY (Patient taking differently: Take 1 tablet by mouth daily) 30 tablet 0    calcitRIOL (ROCALTROL) 0.25 MCG capsule Take 1 capsule by mouth daily      aspirin 81 MG EC tablet Take 1 tablet by mouth daily 30 tablet 3    rosuvastatin (CRESTOR) 40 MG tablet Take 1 tablet by mouth nightly 30 tablet 3    Cholecalciferol (VITAMIN D3 ULTRA POTENCY) 1.25 MG (04082 UT) TABS Take 50,000 Units by mouth once a week (on Saturdays)         Allergies:  Iodides, Shellfish-derived products, and Atorvastatin calcium    Social History:    Social History     Socioeconomic History    Marital status:      Spouse name: Sachi    Number of children: 7    Years of education: 13    Highest education level: Not on file   Occupational History    Occupation: IT   Tobacco Use    Smoking status: Never    Smokeless tobacco: Never   Vaping Use    Vaping Use: Never used   Substance and Sexual Activity    Alcohol use: Yes     Comment: one drink a month    Drug use: No    Sexual activity: Yes     Partners: Female   Other Topics Concern    Not on file   Social History Narrative    Not on file     Social Determinants of Health     Financial Resource Strain: Not on file   Food Insecurity: No Food Insecurity (4/16/2024)    Hunger Vital Sign     Worried About Running Out of Food in the Last Year: Never true     Ran Out of Food in the Last Year: Never true   Transportation Needs: No Transportation Needs (4/16/2024)    PRAPARE - Transportation     Lack of Transportation (Medical): No     Lack of Transportation (Non-Medical): No   Physical Activity: Not on file   Stress: Not on file   Social

## 2024-04-16 NOTE — ED PROVIDER NOTES
EMERGENCY DEPARTMENT SUPERVISING PHYSICIAN NOTE    I have seen this patient & have reviewed history and findings with the PA, NP, or resident physician and provided direct personal supervision as needed. I was present for key portions of any procedures performed. I've participated in medical management, monitoring, and treatment of this patient with the provider. I have reviewed currently available documentation, test results, and laboratory results in the interim. Care plan has been developed collaboratively.    Te Garay is a 59 y.o. year old male presenting to the emergency department for Cough (Pt arrived via [facility] w/ cc of cough for 4 days. Cough was productive until today. Pt states generalized weakness, fatigue, and congestion. Peritoneal dialysis pt.  )    Brief HPI:  Very pleasant 59yrs M reports that he has had a frequent, harsh, nonproductive cough as of late  During this same time he has been feeling increasingly fatigued and generally weak  His appetite has been quite poor and he has had little to eat or drink in the last 48 hours  With time he has developed soreness and diffuse, nonsevere, dull pain throughout his chest  Today he noticed an abnormal odor to his breath / taste in his mouth that he is experienced previously during episodes of diabetic ketoacidosis  Tonight when he checked his blood glucose level it was significantly elevated  Typically utilizes an insulin pump but it has not been working as of late; he thinks it is an issue with the pump's tubing currently    Pertinent Exam Findings:  Awake, alert, slightly ill-appearing, not distressed  CTAB, RRR, RR is normal, WOB slightly increased  Abdomen soft, NT, ND  PD catheter to RLQ abdomen  Trace edema is present to the feet only    Results for orders placed or performed during the hospital encounter of 04/15/24   COVID-19 & Influenza Combo    Specimen: Nasopharyngeal Swab   Result Value Ref Range    SARS-CoV-2 RNA, RT PCR NOT  MetHgb, Andrez 0.2 <1.5 %    TC02 (Calc), Andrez 42 Not Established mmol/L    O2 Content, Andrez 17 Not Established VOL %    O2 Therapy Unknown     Hemoglobin, Andrez, Reduced 8 %   Beta-Hydroxybutyrate   Result Value Ref Range    Beta-Hydroxybutyrate 3.20 (H) 0.00 - 0.27 mmol/L   Troponin   Result Value Ref Range    Troponin, High Sensitivity 115 (H) 0 - 22 ng/L   POCT Glucose   Result Value Ref Range    POC Glucose 479 (H) 70 - 99 mg/dl    Performed on ACCU-CHEK    POCT Glucose   Result Value Ref Range    POC Glucose 507 (H) 70 - 99 mg/dl    Performed on ACCU-CHEK    EKG 12 Lead   Result Value Ref Range    Ventricular Rate 82 BPM    Atrial Rate 82 BPM    P-R Interval 150 ms    QRS Duration 102 ms    Q-T Interval 418 ms    QTc Calculation (Bazett) 488 ms    P Axis 77 degrees    R Axis 108 degrees    T Axis -2 degrees    Diagnosis       Normal sinus rhythmPossible Left atrial enlargementRightward axisSeptal infarct , age undetermined     XR CHEST PORTABLE   Final Result      Lungs are clear           EKG My Reading:  Rate: 68  Rhythm: sinus  ST Segments: abnormal ST segment morphology and inferior leads and lead V6  STEMI: no  QTc: 488 (prolonged)  Comparison to prior: inferior ST segment changes are worsened compared to 12/2023, Qtc has increased, otherwise not substantially changed from 12/2023    Medications   insulin regular (HUMULIN R;NOVOLIN R) 100 Units in sodium chloride 0.9 % 100 mL infusion (has no administration in time range)   acetaminophen (TYLENOL) tablet 1,000 mg (has no administration in time range)   lidocaine 4 % external patch 2 patch (has no administration in time range)   aspirin chewable tablet 162 mg (has no administration in time range)   sodium chloride 0.9 % bolus 1,000 mL (has no administration in time range)   insulin regular (HUMULIN R;NOVOLIN R) injection 8 Units (8 Units SubCUTAneous Given 4/15/24 2207)     Plan:  Lab testing is consistent with DKA as he has significant hyperglycemia, decreased

## 2024-04-16 NOTE — ED PROVIDER NOTES
[facility] EMERGENCY DEPARTMENT  EMERGENCY DEPARTMENT ENCOUNTER        Pt Name: Te Garay  MRN: [id number]  Birthdate 1965  Date of evaluation: 4/15/2024  Provider: VERONICA Neely  PCP: Reid Lei MD  Note Started: 11:22 PM EDT 4/15/24       I have seen and evaluated this patient with my supervising physician Jl Gomes MD.      CHIEF COMPLAINT       Chief Complaint   Patient presents with    Cough     Pt arrived via [facility] w/ cc of cough for 4 days. Cough was productive until today. Pt states generalized weakness, fatigue, and congestion. Peritoneal dialysis pt.         HISTORY OF PRESENT ILLNESS: 1 or more Elements     History From: Patient  Limitations to history : None    Te Garay is a 59 y.o. male with past medical history of CHF, cardiomyopathy, chronic kidney disease, CAD, hyperlipidemia, hypertension, peripheral vascular disease, diabetes and peritoneal dialysis status who presents ED with complaint of a cough.  He arrives by EMS from home with complaint of cough for the past 4 days.  Cough initially productive of sputum but now dry.  He reports increasing shortness of breath with exertion and fatigue/weakness.  Became concerned and came to the ED for further evaluation and treatment.  He does do peritoneal dialysis.  He reports there has been an illness around his house.  He denies any fever, chills, chest pain, abdominal pain, nausea/vomiting, urinary symptoms or changes in bowel movements.    Nursing Notes were all reviewed and agreed with or any disagreements were addressed in the HPI.    REVIEW OF SYSTEMS :      Review of Systems   Constitutional:  Positive for activity change and fatigue. Negative for appetite change, chills, diaphoresis and fever.   Eyes:  Negative for photophobia and visual disturbance.   Respiratory:  Positive for cough and shortness of breath. Negative for chest tightness.    Cardiovascular: Negative.  Negative for chest  Normal breath sounds. No stridor. No wheezing, rhonchi or rales.      Comments: Harsh nonproductive cough noted  Chest:      Chest wall: No tenderness.   Abdominal:      General: Abdomen is flat. Bowel sounds are normal. There is no distension.      Palpations: Abdomen is soft. There is no mass.      Tenderness: There is no abdominal tenderness. There is no right CVA tenderness, left CVA tenderness, guarding or rebound.      Hernia: No hernia is present.      Comments: Peritoneal dialysis catheter noted.  Insulin pump noted.    Musculoskeletal:         General: Normal range of motion.      Cervical back: Normal range of motion and neck supple. No rigidity or tenderness.   Lymphadenopathy:      Cervical: No cervical adenopathy.   Skin:     General: Skin is warm and dry.      Coloration: Skin is not pale.      Findings: No erythema or rash.   Neurological:      General: No focal deficit present.      Mental Status: He is alert and oriented to person, place, and time.      Cranial Nerves: No cranial nerve deficit.      Sensory: No sensory deficit.      Motor: No weakness.      Coordination: Coordination normal.      Gait: Gait normal.   Psychiatric:         Behavior: Behavior normal.         DIAGNOSTIC RESULTS   LABS:    Labs Reviewed   CBC WITH AUTO DIFFERENTIAL - Abnormal; Notable for the following components:       Result Value    WBC 3.7 (*)     Hemoglobin 13.1 (*)     Hematocrit 40.2 (*)     RDW 16.2 (*)     Platelets 129 (*)     Lymphocytes Absolute 0.6 (*)     All other components within normal limits   COMPREHENSIVE METABOLIC PANEL W/ REFLEX TO MG FOR LOW K - Abnormal; Notable for the following components:    Sodium 131 (*)     Chloride 94 (*)     CO2 14 (*)     Anion Gap 23 (*)     Glucose 497 (*)     BUN 55 (*)     Creatinine 8.1 (*)     Est, Glom Filt Rate 7 (*)     ALT 7 (*)     All other components within normal limits    Narrative:     CALL  [facility] tel. [phone],  Chemistry results called to and

## 2024-04-16 NOTE — ACP (ADVANCE CARE PLANNING)
Advanced Care Planning Note    Purpose of Encounter: Advanced care planning in light of DKA  Parties In Attendance: Patient, Sachi (Healthcare POA)  Decisional Capacity: Yes  Subjective: Patient has DKA  Objective: Cr 8.7  Goals of Care Determination: Patient wants full support  Plan:  DKA protocol  Code Status: Full   Time spent on Advanced care Plannin minutes  Advanced Care Planning Documents: Completed advanced directives on chart, Sachi, is the Healthcare POA.    Jeyson Fernandez MD  2024 4:29 PM

## 2024-04-16 NOTE — PROGRESS NOTES
[facility]  Diabetes Education   Progress Note       NAME:  Te Garay  MEDICAL RECORD NUMBER:  [id number]  AGE: 59 y.o.   GENDER: male  : 1965  TODAY'S DATE:  2024    Subjective   Reason for Diabetes Education Evaluation and Assessment: insulin pump    Visit Type: evaluation      Te Garay is a 59 y.o. male referred by:  [x] Physician  [] Nursing  [] Chart Review   [] Other:     Pt states he has resumed his insulin pump.  Has extra supplies at bedside if needed.  States his pump kinked prior to DKA symptoms, pump began alarming while he was in the emergency department.  States his pump was working fine before that.    PAST MEDICAL HISTORY        Diagnosis Date    Angina at rest ([facility])     Cardiomyopathy ([facility])     Carotid artery stenosis 10/25/2016    SUZANNA stented with 8 x 30 mm non-tapered Xact stent    CHF (congestive heart failure) ([facility]) 2015    EF 30%     CKD (chronic kidney disease) stage 2, GFR 60-89 ml/min     Coronary artery disease     sp CABG     Diabetic peripheral neuropathy ([facility])     Diastolic HF (heart failure) ([facility])     Hyperlipidemia with target LDL less than 70     Hypertension goal BP (blood pressure) < 130/80     PVD (peripheral vascular disease) ([facility])     Seizures ([facility])     in childhood    Type 1 diabetes mellitus with chronic kidney disease ([facility])     c-peptide <0.1 in        PAST SURGICAL HISTORY    Past Surgical History:   Procedure Laterality Date    BLADDER SURGERY Left 2023    CYSTOSCOPY, LEFT URETEROSCOPY, STONE BASKET MANIPULATION, PLACEMENT OF LEFT URETERAL STENT performed by Chevy Sepulveda MD at [facility] OR    CARDIAC CATHETERIZATION      CARDIAC SURGERY      triple bypass at     CAROTID STENT PLACEMENT Right     CORONARY ARTERY BYPASS GRAFT      FOOT DEBRIDEMENT Left 10/16/2023    LEFT FOOT DEBRIDEMENT INCISION AND DRAINAGE WITH BONE BIOPSY performed by David Lowe DPM at [facility] OR    FOOT DEBRIDEMENT Left

## 2024-04-16 NOTE — PROGRESS NOTES
0330 - Called lab regarding second blood culture that is needed. Sending someone up to collect along with BMP, K, and Phos  0530 - Called lab again about second blood culture that is still needed. Sending someone up to collect along with BMP, K, and Phos

## 2024-04-16 NOTE — H&P
Hospitalist History and Physical      PCP: Reid Lei MD    Date of Admission: 4/15/2024     Anticipated Discharge Day/Date - 4/17/24    Anticipated Discharge Location:  [x]Home  []HHC  []SNF  []Acute Rehab  []Hospice    Assessment/Plan:      DKA, type 1, not at goal (HCC)    DKA in DM1. Initial glucose 490s, BHB 3.2 ph 7.37, bicarb 17. Full sequela of progressed DM including infections with prior debridement especially of LLE, nephropathy, neuropathy. (Retinopathy?)  Follows with endocrinology, his insulin pump wire was kinked for a few hours today but sugars have been high at home despite corrective scale.  He is not sure his total daily insulin dose, bolus and basal, just that the insulin pump alerts him.  Insulin drip at this time.  Given amp of bicarb for level 14 on admission chemical panel.  Judicious IV fluids given ESRD status, status post 1 L NS in the ED. CXR clear as are viral covid/flu.  ESRD on PD. Scr 8.1, bun 55, gfr 7. Nephrology in consult. BNP 3k suggests fluid overload, will be managed by dialysis under direction of nephrology.  CAD hx CABG, HFrEF.  EF 35, global hypokinesis on echo 6/2023.  Continue Coreg, Entresto, oral Lasix, Imdur, Ranexa, aspirin, statin.  Seizure, mood disorder.  Continue Keppra, Zoloft  Pancytopenia.   LFTs WNL  Leukopenia 3.7k appears to be chronic for patient since 11/2023.   Anemia. Hgb 13.1, baseline 10-11. Transfuse hgb <7.  Thrombocytopenia 129k is about baseline for patient. Transfuse <10k or <50k with overt bleed.    Other chronic medical conditions reviewed and managed.    DVT Prophylaxis:  []PPx LMWH  [x]SQ Heparin  []IPC/SCDs  []Eliquis  []Xarelto  []Coumadin  []Other -        Diet: Diet NPO  Code Status: Full Code      77 Minutes were spent solely for medical decision making for this patient including documentation, ordering and interpreting labs imaging and studies, independently reviewing the chart as well as discussing plan of care with the

## 2024-04-16 NOTE — ACP (ADVANCE CARE PLANNING)
Advance Care Planning     Advance Care Planning Inpatient Note  Spiritual Care Department    Today's Date: 4/16/2024  Unit: [facility]    Received request from IDT Member, Elise Banegas RN.  Upon review of chart and communication with care team, patient's decision making abilities are not in question.. Patient was/were present in the room during visit.    Goals of ACP Conversation:  Discuss advance care planning documents  Facilitate a discussion related to patient's goals of care as they align with the patient's values and beliefs.    Health Care Decision Makers:     No healthcare decision makers have been documented.  Click here to complete HealthCare Decision Makers including selection of the Healthcare Decision Maker Relationship (ie \"Primary\")  Summary:  No Decision Maker named by patient at this time    Advance Care Planning Documents (Patient Wishes):  None     Assessment:  Spiritual Care consult to facilitate completion of Health Care Power of  and Living Will. Pt was awake and alert. Pt was given blank copies of AD documents to review/complete when appropriate. Pt will notify the nurse to contact Spiritual Care Services to witness pt sign completed POA/LW documents.  Provided support through active listening, affirmed feelings, thoughts, concerns, conversation and blessing. Pt expressed hope and gratitude.    Interventions:  Provided education on documents for clarity and greater understanding  Discussed and provided education on state decision maker hierarchy  Encouraged ongoing ACP conversation with future decision makers and loved ones  Reviewed but did not complete ACP document    Care Preferences Communicated:   No    Outcomes/Plan:  ACP Discussion: Postponed. Pt will notify the nurse when appropriate.    Electronically signed by Chaplain Vishnu on 4/16/2024 at 5:25 PM

## 2024-04-16 NOTE — PROGRESS NOTES
errors.     Objective:   Vitals:   T-max:  Patient Vitals for the past 8 hrs:   BP Temp Temp src Pulse Resp SpO2   04/16/24 1128 (!) 145/69 97.7 °F (36.5 °C) Temporal 85 19 97 %       Intake/Output Summary (Last 24 hours) at 4/16/2024 1613  Last data filed at 4/16/2024 1019  Gross per 24 hour   Intake 1021.01 ml   Output 500 ml   Net 521.01 ml       Physical Exam  General appearance: Very pleasant, nontoxic appearing middle-aged -American male. No apparent distress, on room air.  Wet cough notable throughout exam.  Holds chest for episode  Respiratory:  Normal respiratory effort. No increased work of breathing. No wheezes rhonchi rales.  Cardiovascular:  Regular rate and rhythm. No murmurs gallops rubs.  Abdomen:  Soft, non-tender, non-distended. Normoactive bowel sounds.  Musculoskelatal: Very mild nonpitting pedal edema . Normal ROM.  Neurologic:  Non-focal. A&O x3  Psychiatric:  Alert and oriented    Review of Systems  - Negative except Interval History    LABS:    CBC:   Recent Labs     04/15/24  2104   WBC 3.7*   HGB 13.1*   HCT 40.2*   *   MCV 89.2     Renal:    Recent Labs     04/15/24  2104 04/16/24  0550   * 139   K 4.6 3.9   CL 94* 102   CO2 14* 23   BUN 55* 58*   CREATININE 8.1* 8.7*   GLUCOSE 497* 99   CALCIUM 8.3 8.0*   MG  --  1.90   PHOS  --  2.6   ANIONGAP 23* 14     Hepatic:   Recent Labs     04/15/24  2104   AST 16   ALT 7*   BILITOT 0.4   PROT 6.9   ALKPHOS 86     Troponin: No results for input(s): \"TROPONINI\" in the last 72 hours.  BNP: No results for input(s): \"BNP\" in the last 72 hours.  Lipids: No results for input(s): \"CHOL\", \"HDL\" in the last 72 hours.    Invalid input(s): \"LDLCALCU\", \"TRIGLYCERIDE\"  ABGs:  No results for input(s): \"PHART\", \"OLE1YCV\", \"PO2ART\", \"TXE5UMD\", \"BEART\", \"THGBART\", \"N9QUJPQY\", \"ATI8OOU\" in the last 72 hours.    INR: No results for input(s): \"INR\" in the last 72 hours.  Lactate: No results for input(s): \"LACTATE\" in the last 72  hours.  Cultures:  -----------------------------------------------------------------  RAD:   XR CHEST PORTABLE   Final Result      Lungs are clear             Medications:     Scheduled Meds:   calcitRIOL  0.25 mcg Oral Daily    carvedilol  25 mg Oral BID    clopidogrel  75 mg Oral Daily    sacubitril-valsartan  1 tablet Oral BID    [Held by provider] furosemide  40 mg Oral BID    isosorbide mononitrate  30 mg Oral Daily    levETIRAcetam  500 mg Oral BID    ranolazine  500 mg Oral BID    rosuvastatin  10 mg Oral Nightly    sertraline  50 mg Oral Daily    heparin (porcine)  5,000 Units SubCUTAneous 3 times per day    Insulin Pump - Bolus Dose   SubCUTAneous 4x Daily AC & HS    Insulin Pump - Basal Dose   SubCUTAneous Daily    dianeal lo-miller 1.5%  2,500 mL IntraPERitoneal Nightly     Continuous Infusions:   dextrose      dextrose 5 % and 0.45 % NaCl Stopped (04/16/24 1001)     PRN Meds:guaiFENesin-dextromethorphan, lactulose, metoclopramide, dextrose bolus **OR** dextrose bolus, potassium chloride, polyethylene glycol, dextrose bolus **OR** dextrose bolus, glucagon (rDNA), dextrose, ondansetron

## 2024-04-16 NOTE — PROGRESS NOTES
How does patient ambulate?   []Low Fall Risk (ambulates by themselves without support)  []Stand by assist   []Contact Guard   []Front wheel walker  []Wheelchair   [x]Steady  []Bed bound  []History of Lower Extremity Amputation  []Unknown, did not assess in the emergency department   How does patient take pills?  [x]Whole with Water  []Crushed in applesauce  []Crushed in pudding  []Other  []Unknown no oral medications were given in the ED  Is patient alert?   [x]Alert  [x]Drowsy but responds to voice  []Doesn't respond to voice but responds to painful stimuli  []Unresponsive  Is patient oriented?   [x]To person  [x]To place  [x]To time  [x]To situation  []Confused  []Agitated  []Follows commands  If patient is disoriented or from a Skill Nursing Facility has family been notified of admission?   []Yes   [x]No  Patient belongings?   []Cell phone  []Wallet   []Dentures  []Clothing  Any specific patient or family belongings/needs/dynamics?     Miscellaneous comments/pending orders?       If there are any additional questions please reach out to the Emergency Department.

## 2024-04-16 NOTE — PROGRESS NOTES
Orders in for transition off insulin gtt from Hospitalist. Confirmed with Hospitalist with gap closed only x1. Confirmed orders for transition off insulin

## 2024-04-17 ENCOUNTER — APPOINTMENT (OUTPATIENT)
Dept: CT IMAGING | Age: 59
DRG: 177 | End: 2024-04-17
Payer: MEDICARE

## 2024-04-17 ENCOUNTER — HOSPITAL ENCOUNTER (INPATIENT)
Age: 59
LOS: 3 days | Discharge: HOME | DRG: 177 | End: 2024-04-21
Attending: EMERGENCY MEDICINE | Admitting: INTERNAL MEDICINE
Payer: MEDICARE

## 2024-04-17 VITALS
BODY MASS INDEX: 29.84 KG/M2 | DIASTOLIC BLOOD PRESSURE: 59 MMHG | WEIGHT: 196.9 LBS | HEART RATE: 64 BPM | SYSTOLIC BLOOD PRESSURE: 127 MMHG | HEIGHT: 68 IN | TEMPERATURE: 97.1 F | RESPIRATION RATE: 18 BRPM | OXYGEN SATURATION: 94 %

## 2024-04-17 DIAGNOSIS — E10.22 TYPE 1 DIABETES MELLITUS WITH STAGE 4 CHRONIC KIDNEY DISEASE (HCC): ICD-10-CM

## 2024-04-17 DIAGNOSIS — J18.9 MULTIFOCAL PNEUMONIA: ICD-10-CM

## 2024-04-17 DIAGNOSIS — N18.4 TYPE 1 DIABETES MELLITUS WITH STAGE 4 CHRONIC KIDNEY DISEASE (HCC): ICD-10-CM

## 2024-04-17 DIAGNOSIS — E13.10 DIABETIC KETOACIDOSIS WITHOUT COMA ASSOCIATED WITH OTHER SPECIFIED DIABETES MELLITUS (HCC): Primary | ICD-10-CM

## 2024-04-17 DIAGNOSIS — K80.20 CALCULUS OF GALLBLADDER WITHOUT CHOLECYSTITIS WITHOUT OBSTRUCTION: ICD-10-CM

## 2024-04-17 DIAGNOSIS — Z99.2 PERITONEAL DIALYSIS STATUS (HCC): ICD-10-CM

## 2024-04-17 LAB
ALBUMIN SERPL-MCNC: 3.2 G/DL (ref 3.4–5)
ALBUMIN SERPL-MCNC: 3.4 G/DL (ref 3.4–5)
ALBUMIN/GLOB SERPL: 1 {RATIO} (ref 1.1–2.2)
ALBUMIN/GLOB SERPL: 1 {RATIO} (ref 1.1–2.2)
ALP SERPL-CCNC: 70 U/L (ref 40–129)
ALP SERPL-CCNC: 71 U/L (ref 40–129)
ALT SERPL-CCNC: 10 U/L (ref 10–40)
ALT SERPL-CCNC: 8 U/L (ref 10–40)
ANION GAP SERPL CALCULATED.3IONS-SCNC: 13 MMOL/L (ref 3–16)
ANION GAP SERPL CALCULATED.3IONS-SCNC: 20 MMOL/L (ref 3–16)
ANION GAP SERPL CALCULATED.3IONS-SCNC: 20 MMOL/L (ref 3–16)
ANION GAP SERPL CALCULATED.3IONS-SCNC: 21 MMOL/L (ref 3–16)
ANION GAP SERPL CALCULATED.3IONS-SCNC: 9 MMOL/L (ref 3–16)
AST SERPL-CCNC: 19 U/L (ref 15–37)
AST SERPL-CCNC: 21 U/L (ref 15–37)
BASE EXCESS BLDV CALC-SCNC: -8.4 MMOL/L (ref -3–3)
BASOPHILS # BLD: 0 K/UL (ref 0–0.2)
BASOPHILS # BLD: 0 K/UL (ref 0–0.2)
BASOPHILS NFR BLD: 0.2 %
BASOPHILS NFR BLD: 0.3 %
BETA-HYDROXYBUTYRATE: 4.1 MMOL/L (ref 0–0.27)
BILIRUB SERPL-MCNC: 0.3 MG/DL (ref 0–1)
BILIRUB SERPL-MCNC: <0.2 MG/DL (ref 0–1)
BUN SERPL-MCNC: 49 MG/DL (ref 7–20)
BUN SERPL-MCNC: 51 MG/DL (ref 7–20)
BUN SERPL-MCNC: 54 MG/DL (ref 7–20)
BUN SERPL-MCNC: 55 MG/DL (ref 7–20)
BUN SERPL-MCNC: 56 MG/DL (ref 7–20)
CALCIUM SERPL-MCNC: 7.2 MG/DL (ref 8.3–10.6)
CALCIUM SERPL-MCNC: 7.3 MG/DL (ref 8.3–10.6)
CALCIUM SERPL-MCNC: 8 MG/DL (ref 8.3–10.6)
CALCIUM SERPL-MCNC: 8.1 MG/DL (ref 8.3–10.6)
CALCIUM SERPL-MCNC: 8.3 MG/DL (ref 8.3–10.6)
CHLORIDE SERPL-SCNC: 102 MMOL/L (ref 99–110)
CHLORIDE SERPL-SCNC: 95 MMOL/L (ref 99–110)
CHLORIDE SERPL-SCNC: 96 MMOL/L (ref 99–110)
CHLORIDE SERPL-SCNC: 97 MMOL/L (ref 99–110)
CHLORIDE SERPL-SCNC: 98 MMOL/L (ref 99–110)
CO2 BLDV-SCNC: 39 MMOL/L
CO2 SERPL-SCNC: 17 MMOL/L (ref 21–32)
CO2 SERPL-SCNC: 19 MMOL/L (ref 21–32)
CO2 SERPL-SCNC: 21 MMOL/L (ref 21–32)
COHGB MFR BLDV: 4 % (ref 0–1.5)
CREAT SERPL-MCNC: 7.4 MG/DL (ref 0.9–1.3)
CREAT SERPL-MCNC: 7.4 MG/DL (ref 0.9–1.3)
CREAT SERPL-MCNC: 7.9 MG/DL (ref 0.9–1.3)
CREAT SERPL-MCNC: 8.1 MG/DL (ref 0.9–1.3)
CREAT SERPL-MCNC: 8.2 MG/DL (ref 0.9–1.3)
DEPRECATED RDW RBC AUTO: 16.1 % (ref 12.4–15.4)
DEPRECATED RDW RBC AUTO: 17.2 % (ref 12.4–15.4)
DO-HGB MFR BLDV: 10 %
EOSINOPHIL # BLD: 0 K/UL (ref 0–0.6)
EOSINOPHIL # BLD: 0 K/UL (ref 0–0.6)
EOSINOPHIL NFR BLD: 0 %
EOSINOPHIL NFR BLD: 0.1 %
GFR SERPLBLD CREATININE-BSD FMLA CKD-EPI: 7 ML/MIN/{1.73_M2}
GFR SERPLBLD CREATININE-BSD FMLA CKD-EPI: 8 ML/MIN/{1.73_M2}
GFR SERPLBLD CREATININE-BSD FMLA CKD-EPI: 8 ML/MIN/{1.73_M2}
GLUCOSE BLD-MCNC: 117 MG/DL (ref 70–99)
GLUCOSE BLD-MCNC: 125 MG/DL (ref 70–99)
GLUCOSE BLD-MCNC: 129 MG/DL (ref 70–99)
GLUCOSE BLD-MCNC: 156 MG/DL (ref 70–99)
GLUCOSE BLD-MCNC: 167 MG/DL (ref 70–99)
GLUCOSE BLD-MCNC: 179 MG/DL (ref 70–99)
GLUCOSE BLD-MCNC: 218 MG/DL (ref 70–99)
GLUCOSE BLD-MCNC: 220 MG/DL (ref 70–99)
GLUCOSE BLD-MCNC: 229 MG/DL (ref 70–99)
GLUCOSE BLD-MCNC: 232 MG/DL (ref 70–99)
GLUCOSE BLD-MCNC: 267 MG/DL (ref 70–99)
GLUCOSE BLD-MCNC: 311 MG/DL (ref 70–99)
GLUCOSE BLD-MCNC: 409 MG/DL (ref 70–99)
GLUCOSE SERPL-MCNC: 246 MG/DL (ref 70–99)
GLUCOSE SERPL-MCNC: 257 MG/DL (ref 70–99)
GLUCOSE SERPL-MCNC: 263 MG/DL (ref 70–99)
GLUCOSE SERPL-MCNC: 364 MG/DL (ref 70–99)
GLUCOSE SERPL-MCNC: 422 MG/DL (ref 70–99)
HCO3 BLDV-SCNC: 16.5 MMOL/L (ref 23–29)
HCT VFR BLD AUTO: 40.1 % (ref 40.5–52.5)
HCT VFR BLD AUTO: 40.8 % (ref 40.5–52.5)
HGB BLD-MCNC: 12.4 G/DL (ref 13.5–17.5)
HGB BLD-MCNC: 12.6 G/DL (ref 13.5–17.5)
LACTATE BLDV-SCNC: 1.5 MMOL/L (ref 0.4–1.9)
LIPASE SERPL-CCNC: 11 U/L (ref 13–60)
LYMPHOCYTES # BLD: 0.5 K/UL (ref 1–5.1)
LYMPHOCYTES # BLD: 0.6 K/UL (ref 1–5.1)
LYMPHOCYTES NFR BLD: 5.5 %
LYMPHOCYTES NFR BLD: 7.8 %
MAGNESIUM SERPL-MCNC: 1.6 MG/DL (ref 1.8–2.4)
MAGNESIUM SERPL-MCNC: 1.7 MG/DL (ref 1.8–2.4)
MAGNESIUM SERPL-MCNC: 1.9 MG/DL (ref 1.8–2.4)
MCH RBC QN AUTO: 27.9 PG (ref 26–34)
MCH RBC QN AUTO: 28.3 PG (ref 26–34)
MCHC RBC AUTO-ENTMCNC: 30.4 G/DL (ref 31–36)
MCHC RBC AUTO-ENTMCNC: 31.6 G/DL (ref 31–36)
MCV RBC AUTO: 88.5 FL (ref 80–100)
MCV RBC AUTO: 93.3 FL (ref 80–100)
METHGB MFR BLDV: 0.3 %
MONOCYTES # BLD: 0.7 K/UL (ref 0–1.3)
MONOCYTES # BLD: 0.9 K/UL (ref 0–1.3)
MONOCYTES NFR BLD: 10 %
MONOCYTES NFR BLD: 9.1 %
NEUTROPHILS # BLD: 6.3 K/UL (ref 1.7–7.7)
NEUTROPHILS # BLD: 7.9 K/UL (ref 1.7–7.7)
NEUTROPHILS NFR BLD: 82.8 %
NEUTROPHILS NFR BLD: 84.2 %
NT-PROBNP SERPL-MCNC: 5121 PG/ML (ref 0–124)
O2 CT VFR BLDV CALC: 16 VOL %
O2 THERAPY: ABNORMAL
PCO2 BLDV: 31.3 MMHG (ref 40–50)
PERFORMED ON: ABNORMAL
PH BLDV: 7.33 [PH] (ref 7.35–7.45)
PHOSPHATE SERPL-MCNC: 2.7 MG/DL (ref 2.5–4.9)
PHOSPHATE SERPL-MCNC: 3 MG/DL (ref 2.5–4.9)
PHOSPHATE SERPL-MCNC: 3.3 MG/DL (ref 2.5–4.9)
PLATELET # BLD AUTO: 116 K/UL (ref 135–450)
PLATELET # BLD AUTO: 127 K/UL (ref 135–450)
PMV BLD AUTO: 10.6 FL (ref 5–10.5)
PMV BLD AUTO: 11.4 FL (ref 5–10.5)
PO2 BLDV: 61.9 MMHG (ref 25–40)
POTASSIUM SERPL-SCNC: 3.3 MMOL/L (ref 3.5–5.1)
POTASSIUM SERPL-SCNC: 3.9 MMOL/L (ref 3.5–5.1)
POTASSIUM SERPL-SCNC: 4.2 MMOL/L (ref 3.5–5.1)
POTASSIUM SERPL-SCNC: 4.5 MMOL/L (ref 3.5–5.1)
POTASSIUM SERPL-SCNC: 4.5 MMOL/L (ref 3.5–5.1)
POTASSIUM SERPL-SCNC: 6.5 MMOL/L (ref 3.5–5.1)
PROT SERPL-MCNC: 6.4 G/DL (ref 6.4–8.2)
PROT SERPL-MCNC: 6.8 G/DL (ref 6.4–8.2)
RBC # BLD AUTO: 4.37 M/UL (ref 4.2–5.9)
RBC # BLD AUTO: 4.53 M/UL (ref 4.2–5.9)
SAO2 % BLDV: 90 %
SODIUM SERPL-SCNC: 130 MMOL/L (ref 136–145)
SODIUM SERPL-SCNC: 132 MMOL/L (ref 136–145)
SODIUM SERPL-SCNC: 132 MMOL/L (ref 136–145)
SODIUM SERPL-SCNC: 133 MMOL/L (ref 136–145)
SODIUM SERPL-SCNC: 135 MMOL/L (ref 136–145)
TROPONIN, HIGH SENSITIVITY: 112 NG/L (ref 0–22)
TROPONIN, HIGH SENSITIVITY: 91 NG/L (ref 0–22)
WBC # BLD AUTO: 7.6 K/UL (ref 4–11)
WBC # BLD AUTO: 9.4 K/UL (ref 4–11)

## 2024-04-17 PROCEDURE — 82803 BLOOD GASES ANY COMBINATION: CPT

## 2024-04-17 PROCEDURE — 83880 ASSAY OF NATRIURETIC PEPTIDE: CPT

## 2024-04-17 PROCEDURE — 6370000000 HC RX 637 (ALT 250 FOR IP): Performed by: STUDENT IN AN ORGANIZED HEALTH CARE EDUCATION/TRAINING PROGRAM

## 2024-04-17 PROCEDURE — 82010 KETONE BODYS QUAN: CPT

## 2024-04-17 PROCEDURE — 80053 COMPREHEN METABOLIC PANEL: CPT

## 2024-04-17 PROCEDURE — 84100 ASSAY OF PHOSPHORUS: CPT

## 2024-04-17 PROCEDURE — 84132 ASSAY OF SERUM POTASSIUM: CPT

## 2024-04-17 PROCEDURE — 6360000002 HC RX W HCPCS: Performed by: STUDENT IN AN ORGANIZED HEALTH CARE EDUCATION/TRAINING PROGRAM

## 2024-04-17 PROCEDURE — 96365 THER/PROPH/DIAG IV INF INIT: CPT

## 2024-04-17 PROCEDURE — 71250 CT THORAX DX C-: CPT

## 2024-04-17 PROCEDURE — 6370000000 HC RX 637 (ALT 250 FOR IP): Performed by: INTERNAL MEDICINE

## 2024-04-17 PROCEDURE — 83690 ASSAY OF LIPASE: CPT

## 2024-04-17 PROCEDURE — 6360000002 HC RX W HCPCS: Performed by: PHYSICIAN ASSISTANT

## 2024-04-17 PROCEDURE — 83735 ASSAY OF MAGNESIUM: CPT

## 2024-04-17 PROCEDURE — 2580000003 HC RX 258: Performed by: PHYSICIAN ASSISTANT

## 2024-04-17 PROCEDURE — 83036 HEMOGLOBIN GLYCOSYLATED A1C: CPT

## 2024-04-17 PROCEDURE — 84484 ASSAY OF TROPONIN QUANT: CPT

## 2024-04-17 PROCEDURE — 99285 EMERGENCY DEPT VISIT HI MDM: CPT

## 2024-04-17 PROCEDURE — 85025 COMPLETE CBC W/AUTO DIFF WBC: CPT

## 2024-04-17 PROCEDURE — 96375 TX/PRO/DX INJ NEW DRUG ADDON: CPT

## 2024-04-17 PROCEDURE — 6370000000 HC RX 637 (ALT 250 FOR IP): Performed by: EMERGENCY MEDICINE

## 2024-04-17 PROCEDURE — 87040 BLOOD CULTURE FOR BACTERIA: CPT

## 2024-04-17 PROCEDURE — 36415 COLL VENOUS BLD VENIPUNCTURE: CPT

## 2024-04-17 PROCEDURE — 83605 ASSAY OF LACTIC ACID: CPT

## 2024-04-17 PROCEDURE — 93005 ELECTROCARDIOGRAM TRACING: CPT | Performed by: PHYSICIAN ASSISTANT

## 2024-04-17 PROCEDURE — 2580000003 HC RX 258: Performed by: STUDENT IN AN ORGANIZED HEALTH CARE EDUCATION/TRAINING PROGRAM

## 2024-04-17 RX ORDER — SODIUM BICARBONATE 650 MG/1
1300 TABLET ORAL ONCE
Status: COMPLETED | OUTPATIENT
Start: 2024-04-18 | End: 2024-04-18

## 2024-04-17 RX ORDER — SODIUM CHLORIDE 9 MG/ML
INJECTION, SOLUTION INTRAVENOUS CONTINUOUS
Status: DISCONTINUED | OUTPATIENT
Start: 2024-04-17 | End: 2024-04-18

## 2024-04-17 RX ORDER — METOCLOPRAMIDE HYDROCHLORIDE 5 MG/ML
10 INJECTION INTRAMUSCULAR; INTRAVENOUS ONCE
Status: COMPLETED | OUTPATIENT
Start: 2024-04-17 | End: 2024-04-17

## 2024-04-17 RX ORDER — ACETAMINOPHEN 650 MG/1
650 SUPPOSITORY RECTAL EVERY 4 HOURS PRN
Status: DISCONTINUED | OUTPATIENT
Start: 2024-04-17 | End: 2024-04-17 | Stop reason: HOSPADM

## 2024-04-17 RX ORDER — BENZONATATE 100 MG/1
100 CAPSULE ORAL 3 TIMES DAILY PRN
Status: DISCONTINUED | OUTPATIENT
Start: 2024-04-17 | End: 2024-04-21 | Stop reason: HOSPADM

## 2024-04-17 RX ORDER — PROMETHAZINE HYDROCHLORIDE 25 MG/1
12.5 TABLET ORAL EVERY 6 HOURS PRN
Status: DISCONTINUED | OUTPATIENT
Start: 2024-04-17 | End: 2024-04-17 | Stop reason: HOSPADM

## 2024-04-17 RX ORDER — BENZONATATE 100 MG/1
100 CAPSULE ORAL 3 TIMES DAILY PRN
Qty: 30 CAPSULE | Refills: 0 | Status: SHIPPED | OUTPATIENT
Start: 2024-04-17 | End: 2024-04-27

## 2024-04-17 RX ORDER — MAGNESIUM SULFATE IN WATER 40 MG/ML
2000 INJECTION, SOLUTION INTRAVENOUS PRN
Status: DISCONTINUED | OUTPATIENT
Start: 2024-04-17 | End: 2024-04-21 | Stop reason: HOSPADM

## 2024-04-17 RX ORDER — POTASSIUM CHLORIDE 7.45 MG/ML
10 INJECTION INTRAVENOUS PRN
Status: DISCONTINUED | OUTPATIENT
Start: 2024-04-17 | End: 2024-04-18

## 2024-04-17 RX ORDER — ACETAMINOPHEN 160 MG/5ML
650 LIQUID ORAL EVERY 4 HOURS PRN
Status: DISCONTINUED | OUTPATIENT
Start: 2024-04-17 | End: 2024-04-17 | Stop reason: HOSPADM

## 2024-04-17 RX ORDER — 0.9 % SODIUM CHLORIDE 0.9 %
500 INTRAVENOUS SOLUTION INTRAVENOUS ONCE
Status: COMPLETED | OUTPATIENT
Start: 2024-04-17 | End: 2024-04-18

## 2024-04-17 RX ORDER — DEXTROSE AND SODIUM CHLORIDE 5; .45 G/100ML; G/100ML
INJECTION, SOLUTION INTRAVENOUS CONTINUOUS PRN
Status: DISCONTINUED | OUTPATIENT
Start: 2024-04-17 | End: 2024-04-18

## 2024-04-17 RX ORDER — DIPHENHYDRAMINE HYDROCHLORIDE 50 MG/ML
25 INJECTION INTRAMUSCULAR; INTRAVENOUS ONCE
Status: COMPLETED | OUTPATIENT
Start: 2024-04-17 | End: 2024-04-17

## 2024-04-17 RX ORDER — BENZONATATE 100 MG/1
100 CAPSULE ORAL 3 TIMES DAILY PRN
Status: DISCONTINUED | OUTPATIENT
Start: 2024-04-17 | End: 2024-04-17 | Stop reason: HOSPADM

## 2024-04-17 RX ORDER — SODIUM BICARBONATE 650 MG/1
1300 TABLET ORAL ONCE
Status: COMPLETED | OUTPATIENT
Start: 2024-04-17 | End: 2024-04-17

## 2024-04-17 RX ADMIN — POTASSIUM CHLORIDE 10 MEQ: 7.46 INJECTION, SOLUTION INTRAVENOUS at 08:54

## 2024-04-17 RX ADMIN — ONDANSETRON 4 MG: 2 INJECTION INTRAMUSCULAR; INTRAVENOUS at 01:17

## 2024-04-17 RX ADMIN — SODIUM CHLORIDE 6.98 UNITS/HR: 9 INJECTION, SOLUTION INTRAVENOUS at 01:09

## 2024-04-17 RX ADMIN — PIPERACILLIN SODIUM AND TAZOBACTAM SODIUM 3375 MG: 3; .375 INJECTION, POWDER, LYOPHILIZED, FOR SOLUTION INTRAVENOUS at 23:38

## 2024-04-17 RX ADMIN — CLOPIDOGREL BISULFATE 75 MG: 75 TABLET ORAL at 09:06

## 2024-04-17 RX ADMIN — HEPARIN SODIUM 5000 UNITS: 5000 INJECTION INTRAVENOUS; SUBCUTANEOUS at 05:45

## 2024-04-17 RX ADMIN — METOCLOPRAMIDE 10 MG: 5 INJECTION, SOLUTION INTRAMUSCULAR; INTRAVENOUS at 23:35

## 2024-04-17 RX ADMIN — ACETAMINOPHEN 650 MG: 650 SOLUTION ORAL at 05:45

## 2024-04-17 RX ADMIN — BENZONATATE 100 MG: 100 CAPSULE ORAL at 23:35

## 2024-04-17 RX ADMIN — LEVETIRACETAM 500 MG: 500 TABLET, FILM COATED ORAL at 09:06

## 2024-04-17 RX ADMIN — CARVEDILOL 25 MG: 25 TABLET, FILM COATED ORAL at 09:06

## 2024-04-17 RX ADMIN — DIPHENHYDRAMINE HYDROCHLORIDE 25 MG: 50 INJECTION, SOLUTION INTRAMUSCULAR; INTRAVENOUS at 23:35

## 2024-04-17 RX ADMIN — BENZONATATE 100 MG: 100 CAPSULE ORAL at 09:11

## 2024-04-17 RX ADMIN — DEXTROSE AND SODIUM CHLORIDE: 5; 450 INJECTION, SOLUTION INTRAVENOUS at 10:01

## 2024-04-17 RX ADMIN — SODIUM CHLORIDE 1320 ML: 9 INJECTION, SOLUTION INTRAVENOUS at 00:50

## 2024-04-17 RX ADMIN — CALCITRIOL CAPSULES 0.25 MCG 0.25 MCG: 0.25 CAPSULE ORAL at 09:06

## 2024-04-17 RX ADMIN — SODIUM CHLORIDE 500 ML: 9 INJECTION, SOLUTION INTRAVENOUS at 23:38

## 2024-04-17 RX ADMIN — POTASSIUM CHLORIDE 10 MEQ: 7.46 INJECTION, SOLUTION INTRAVENOUS at 12:12

## 2024-04-17 RX ADMIN — PROMETHAZINE HYDROCHLORIDE 12.5 MG: 25 TABLET ORAL at 02:25

## 2024-04-17 RX ADMIN — RANOLAZINE 500 MG: 500 TABLET, EXTENDED RELEASE ORAL at 09:06

## 2024-04-17 RX ADMIN — ISOSORBIDE MONONITRATE 30 MG: 30 TABLET, EXTENDED RELEASE ORAL at 09:06

## 2024-04-17 RX ADMIN — HEPARIN SODIUM 5000 UNITS: 5000 INJECTION INTRAVENOUS; SUBCUTANEOUS at 14:00

## 2024-04-17 RX ADMIN — SACUBITRIL AND VALSARTAN 1 TABLET: 49; 51 TABLET, FILM COATED ORAL at 09:06

## 2024-04-17 RX ADMIN — GUAIFENESIN SYRUP AND DEXTROMETHORPHAN 5 ML: 100; 10 SYRUP ORAL at 05:45

## 2024-04-17 RX ADMIN — BENZONATATE 100 MG: 100 CAPSULE ORAL at 14:03

## 2024-04-17 RX ADMIN — POTASSIUM CHLORIDE 10 MEQ: 7.46 INJECTION, SOLUTION INTRAVENOUS at 10:00

## 2024-04-17 RX ADMIN — POTASSIUM CHLORIDE 10 MEQ: 7.46 INJECTION, SOLUTION INTRAVENOUS at 11:07

## 2024-04-17 RX ADMIN — SODIUM BICARBONATE 1300 MG: 650 TABLET ORAL at 01:17

## 2024-04-17 RX ADMIN — SERTRALINE HYDROCHLORIDE 50 MG: 50 TABLET ORAL at 09:06

## 2024-04-17 RX ADMIN — SODIUM CHLORIDE: 9 INJECTION, SOLUTION INTRAVENOUS at 01:56

## 2024-04-17 RX ADMIN — DEXTROSE AND SODIUM CHLORIDE: 5; 450 INJECTION, SOLUTION INTRAVENOUS at 03:20

## 2024-04-17 ASSESSMENT — PAIN SCALES - GENERAL
PAINLEVEL_OUTOF10: 3
PAINLEVEL_OUTOF10: 0
PAINLEVEL_OUTOF10: 6
PAINLEVEL_OUTOF10: 0

## 2024-04-17 ASSESSMENT — PAIN DESCRIPTION - DESCRIPTORS: DESCRIPTORS: ACHING

## 2024-04-17 ASSESSMENT — PAIN DESCRIPTION - LOCATION
LOCATION: GENERALIZED
LOCATION: BACK;CHEST

## 2024-04-17 ASSESSMENT — PAIN - FUNCTIONAL ASSESSMENT: PAIN_FUNCTIONAL_ASSESSMENT: 0-10

## 2024-04-17 NOTE — CARE COORDINATION
Case Management Assessment  Initial Evaluation    Date/Time of Evaluation: 4/17/2024 1:40 PM   Assessment Completed by: SHEELA DÍAZ RN    If patient is discharged prior to next notation, then this note serves as note for discharge by case management.    Patient Name: Te Garay                   YOB: 1965  Diagnosis: Elevated troponin [R79.89]  DKA, type 1, not at goal (HCC) [E10.10]  Diabetic ketoacidosis without coma associated with type 1 diabetes mellitus (HCC) [E10.10]  Chest pain, unspecified type [R07.9]  Acute cough [R05.1]                   Date / Time: 4/15/2024  8:22 PM    Patient Admission Status: Inpatient   Readmission Risk (Low < 19, Mod (19-27), High > 27): Readmission Risk Score: 22    Current PCP: Reid Lei MD  PCP verified by CM? Yes    Chart Reviewed: Yes      History Provided by: Patient  Patient Orientation: Alert and Oriented, Person, Place, Situation    Patient Cognition: Alert    Hospitalization in the last 30 days (Readmission):  No    If yes, Readmission Assessment in  Navigator will be completed.    Advance Directives:      Code Status: Full Code   Patient's Primary Decision Maker is: Legal Next of Kin      Discharge Planning:    Patient lives with: Spouse/Significant Other Type of Home: House (bi level with 2 step into front, from garage 7 steps. between levels 5-6 steps)  Primary Care Giver: Self  Patient Support Systems include: Spouse/Significant Other, Family Members, Children   Current Financial resources: Medicare  Current community resources: Other (Comment) (does Peritoneal dialysis at home.)  Current services prior to admission: Durable Medical Equipment, Other (Comment) (Peritoneal dialysis at home, supplies through [employer])            Current DME: Cane, Walker, Other (Comment) (states does not use them)            Type of Home Care services:  None    ADLS  Prior functional level: Independent in ADLs/IADLs  Current functional level:

## 2024-04-17 NOTE — PROGRESS NOTES
[facility]   Nephrology progress Note  [phone]  [phone]   [employer]         Reason for Consult:  ESRD    HISTORY OF PRESENT ILLNESS:      The patient is a 59 y.o. male with significant past medical history of diabetes, ESRD, cardiomyopathy who presents with DKA. On insulin drip per protocol. Didn't get PD last night. Reports coughing but no shortness of breath or chest pain. Nephrology is consulted for ESRD management.   On CCPD at home. Follows with Dr. Tam Allen    Interval history:  Gap opened again overnight. Back on DKA drips/protocol.   PD without any acute events.   Patient offers no complains today.     Current Medications:     calcitRIOL  0.25 mcg Oral Daily    carvedilol  25 mg Oral BID    clopidogrel  75 mg Oral Daily    sacubitril-valsartan  1 tablet Oral BID    [Held by provider] furosemide  40 mg Oral BID    isosorbide mononitrate  30 mg Oral Daily    levETIRAcetam  500 mg Oral BID    ranolazine  500 mg Oral BID    rosuvastatin  10 mg Oral Nightly    sertraline  50 mg Oral Daily    heparin (porcine)  5,000 Units SubCUTAneous 3 times per day    [Held by provider] Insulin Pump - Bolus Dose   SubCUTAneous 4x Daily AC & HS    [Held by provider] Insulin Pump - Basal Dose   SubCUTAneous Daily    dianeal lo-miller 1.5%  2,500 mL IntraPERitoneal Nightly         PHYSICAL EXAM:    Vitals:    BP (!) 141/56   Pulse 71   Temp 97.2 °F (36.2 °C) (Temporal)   Resp 18   Ht 1.727 m (5' 8\")   Wt 89.3 kg (196 lb 14.4 oz)   SpO2 92%   BMI 29.94 kg/m²     Intake/Output Summary (Last 24 hours) at 4/17/2024 1051  Last data filed at 4/17/2024 0800  Gross per 24 hour   Intake 60 ml   Output --   Net 60 ml       Physical Exam:  CONSTITUTIONAL/PSYCHIATRY: awake, alert and oriented x3. Not in acute distress   EYES: Conjunctivae: normal. Pupils: reactive to light  RESPIRATORY: Respiratory effort: normal. Auscultation: normal  CARDIOVASCULAR: Auscultation: regular. No JVD, Edema: none.

## 2024-04-17 NOTE — PROGRESS NOTES
Admit Date: 4/15/2024  Diet: Diet NPO    CC: DKA    Interval history:   Overnight, there were no acute events. Patient's vitals remained stable    Patient was seen this morning sitting up in a chair. Patient endorses that he feels a little better. I let him know that his AG is still open and once it closes x 2, we will transition back to his insulin pump. Patient denies fevers, chills, nausea, vomiting, chest pain, shortness of breath, diarrhea, constipation, dysuria, urinary frequency or urgency.     Plan:     -DKA protocol  -Await anion gap to close x 2  -Transition back to insulin pump once gap has closed x 2    Assessment:   Te Garay is a 59 y.o. male with PMH of poorly controlled DM1 uses insulin pump, has full sequela including nephropathy ESRD on PD, polyneuropathy with history of LLE debridement, CAD status post CABG, HFrEF, seizure disorder, mood disorder  who was admitted with DKA    DKA  Patient endorses that he was not feeling well at home. Apparently he had a cough. CXR was negative for any acute process. He presented with glucose 490s, BHB 3.2 ph 7.37, bicarb 17, AG 23    ESRD on PD  Patient follows with nephrology in the outpatient setting. He is on PD at home.  -Nephrology consulted, appreciate recs    CAD  Patient is s/p CABG. Last echo with EF 35%. At home, patient is on Coreg, Entresto, oral Lasix, Imdur, Ranexa, aspirin,     HLD  At home, patient is on Carvedilol    Seizure disorder and mood disorder  At home, patient is on Keppra and Zoloft    Code Status: Full Code   FEN: Diet NPO   DVT PPX: [] Lovenox, [x]Heparin, [] SCDs,[] Eliquis, [] Xarelto, [] Coumadin  DISPO: Continue current mgmt of DKA    Jeyson Fernandez MD  4/17/2024,  11:19 AM    This note was likely completed using voice recognition technology and may contain unintended errors.     Objective:   Vitals:   T-max:  Patient Vitals for the past 8 hrs:   BP Temp Temp src Pulse Resp SpO2 Weight   04/17/24 0800 (!) 141/56 97.2 °F

## 2024-04-17 NOTE — PROGRESS NOTES
Treatment time: 9 Hours 30 minutes  Net UF: 365 ml  Dwell Time: 17 minutes gained     Treatment completed without complications or complaints from patient. Effluent clear yellow  and lines taped to patient per protocol. Patient resting comfortably with VSS upon exiting room.      Copy of dialysis treatment record placed in chart, to be scanned into EMR.

## 2024-04-17 NOTE — PROGRESS NOTES
@0109: Insulin gtt restarted. Anion gap 21. 0.9% IV fluid given per protocol.     @0324: Fluid changed D5 and 0.45%.

## 2024-04-17 NOTE — DISCHARGE SUMMARY
V2.0  Discharge Summary    Name:  Te Garay /Age/Sex: 1965 (59 y.o. male)   Admit Date: 4/15/2024  Discharging Provider: Jeyson Fernandez MD   MRN & CSN:  [id number] & [id number] Attending:  No att. providers found       Brief HPI: Te Garay is a 59 y.o. male with PMH of poorly controlled DM1 uses insulin pump, has full sequela including nephropathy ESRD on PD, polyneuropathy with history of LLE debridement, CAD status post CABG, HFrEF, seizure disorder, mood disorder  who was admitted with DKA. Patient's DKA resolved with insulin gtt. He was transitioned back to his insulin pump. He was discharged home.     Brief Problem Focused Hospital Course:     DKA  Patient endorses that he was not feeling well at home. Apparently he had a cough. CXR was negative for any acute process. He presented with glucose 490s, BHB 3.2 ph 7.37, bicarb 17, AG 23     ESRD on PD  Patient follows with nephrology in the outpatient setting. He is on PD at home.  -Nephrology consulted, appreciate recs     CAD  Patient is s/p CABG. Last echo with EF 35%. At home, patient is on Coreg, Entresto, oral Lasix, Imdur, Ranexa, aspirin,      HLD  At home, patient is on      Seizure disorder and mood disorder  At home, patient is on Keppra and Zoloft    The patient expressed appropriate understanding of, and agreement with the discharge recommendations, medications, and plan.     Consults this admission:  IP CONSULT TO NEPHROLOGY  IP CONSULT TO PHARMACY  IP CONSULT TO DIABETES EDUCATOR  IP CONSULT TO SPIRITUAL SERVICES  IP CONSULT TO DIABETES EDUCATOR  IP CONSULT TO DIABETES EDUCATOR    Discharge Instruction:   Primary care physician: Reid Lei MD within 2 weeks  Disposition: Discharged to: [x]Home, []HHC, []SNF, []Acute Rehab, []Hospice []AMA  Condition on discharge: Stable    Physical Exam:   General appearance: Very pleasant, nontoxic appearing middle-aged -American male. No apparent distress, on room air.  Wet cough  medication(s) that are the same as other medications prescribed for you. Read the directions carefully, and ask your doctor or other care provider to review them with you.                   Where to Get Your Medications        These medications were sent to [id number] - [place], [place] - [address] - P [phone] - F [phone] [address] [facility] [address]      Phone: [phone]   benzonatate 100 MG capsule          Discharge Diagnosis:   DKA, type 1, not at goal (HCC)    Labs and Imaging   XR CHEST PORTABLE    Result Date: 4/15/2024  EXAMINATION: ONE XRAY VIEW OF THE CHEST 4/15/2024 9:08 pm COMPARISON: 11/18/2023 HISTORY: ORDERING SYSTEM PROVIDED HISTORY: cough TECHNOLOGIST PROVIDED HISTORY: Reason for exam:->cough Reason for Exam: cough FINDINGS: Heart size is upper limits of normal aorta is normal.  Lungs are normally expanded and clear. No pleural effusions. Mild spondylosis     Lungs are clear       CBC:   Recent Labs     04/15/24  2104 04/16/24  1750 04/17/24  0510   WBC 3.7* 9.4 7.6   HGB 13.1* 12.7* 12.4*   * 130* 116*     BMP:    Recent Labs     04/17/24  0510 04/17/24  0800 04/17/24  1215 04/17/24  1333   * 132* 130*  --    K 4.5 3.3* 6.5* 4.5   CL 96* 98* 102  --    CO2 17* 21 19*  --    BUN 56* 51* 49*  --    CREATININE 8.2* 7.4* 7.4*  --    GLUCOSE 263* 422* 257*  --      Hepatic:   Recent Labs     04/15/24  2104 04/16/24  1750 04/17/24  0510   AST 16 23 21   ALT 7* 10 8*   BILITOT 0.4 0.3 <0.2   ALKPHOS 86 69 70     Lipids:   Lab Results   Component Value Date/Time    CHOL 128 07/05/2023 05:13 AM    HDL 41 07/05/2023 05:13 AM    TRIG 55 07/05/2023 05:13 AM     Hemoglobin A1C:   Lab Results   Component Value Date/Time    LABA1C 9.0 04/16/2024 05:50 AM     TSH:   Lab Results   Component Value Date/Time    TSH 1.60 05/01/2020 06:44 AM     Troponin: No results found for: \"TROPONINT\"  Lactic Acid: No results for input(s): \"LACTA\" in the last 72 hours.  BNP:

## 2024-04-17 NOTE — CARE COORDINATION
Patient discharged 4/17/2024 to home  All discharge needs met per case management     Radha Browning RN, BSN  [phone]

## 2024-04-17 NOTE — CARE COORDINATION
Chart reviewed at this time for discharge planning. Pt from home. Does Peritoneal dialysis. Has used [facility] in the past. CM will follow for discharge plan and needs.    Radha Browning RN, BSN  [phone]

## 2024-04-18 ENCOUNTER — APPOINTMENT (OUTPATIENT)
Dept: ULTRASOUND IMAGING | Age: 59
DRG: 177 | End: 2024-04-18
Payer: MEDICARE

## 2024-04-18 ENCOUNTER — APPOINTMENT (OUTPATIENT)
Dept: GENERAL RADIOLOGY | Age: 59
DRG: 177 | End: 2024-04-18
Payer: MEDICARE

## 2024-04-18 PROBLEM — E87.20 ACIDOSIS: Status: ACTIVE | Noted: 2024-04-18

## 2024-04-18 LAB
ANION GAP SERPL CALCULATED.3IONS-SCNC: 15 MMOL/L (ref 3–16)
ANION GAP SERPL CALCULATED.3IONS-SCNC: 16 MMOL/L (ref 3–16)
ANION GAP SERPL CALCULATED.3IONS-SCNC: 22 MMOL/L (ref 3–16)
BASE EXCESS BLDV CALC-SCNC: -1.9 MMOL/L (ref -3–3)
BASOPHILS # BLD: 0 K/UL (ref 0–0.2)
BASOPHILS NFR BLD: 0.2 %
BUN SERPL-MCNC: 57 MG/DL (ref 7–20)
BUN SERPL-MCNC: 59 MG/DL (ref 7–20)
BUN SERPL-MCNC: 59 MG/DL (ref 7–20)
CALCIUM SERPL-MCNC: 7.8 MG/DL (ref 8.3–10.6)
CALCIUM SERPL-MCNC: 7.9 MG/DL (ref 8.3–10.6)
CALCIUM SERPL-MCNC: 8.2 MG/DL (ref 8.3–10.6)
CHLORIDE SERPL-SCNC: 100 MMOL/L (ref 99–110)
CHLORIDE SERPL-SCNC: 93 MMOL/L (ref 99–110)
CHLORIDE SERPL-SCNC: 98 MMOL/L (ref 99–110)
CO2 BLDV-SCNC: 53 MMOL/L
CO2 SERPL-SCNC: 16 MMOL/L (ref 21–32)
CO2 SERPL-SCNC: 20 MMOL/L (ref 21–32)
CO2 SERPL-SCNC: 20 MMOL/L (ref 21–32)
COHGB MFR BLDV: 3.2 % (ref 0–1.5)
CREAT SERPL-MCNC: 8 MG/DL (ref 0.9–1.3)
CREAT SERPL-MCNC: 8.1 MG/DL (ref 0.9–1.3)
CREAT SERPL-MCNC: 8.3 MG/DL (ref 0.9–1.3)
DEPRECATED RDW RBC AUTO: 16.3 % (ref 12.4–15.4)
EOSINOPHIL # BLD: 0 K/UL (ref 0–0.6)
EOSINOPHIL NFR BLD: 0 %
EST. AVERAGE GLUCOSE BLD GHB EST-MCNC: 214.5 MG/DL
FLUAV RNA RESP QL NAA+PROBE: NOT DETECTED
FLUBV RNA RESP QL NAA+PROBE: NOT DETECTED
GFR SERPLBLD CREATININE-BSD FMLA CKD-EPI: 7 ML/MIN/{1.73_M2}
GLUCOSE BLD-MCNC: 149 MG/DL (ref 70–99)
GLUCOSE BLD-MCNC: 195 MG/DL (ref 70–99)
GLUCOSE BLD-MCNC: 221 MG/DL (ref 70–99)
GLUCOSE BLD-MCNC: 264 MG/DL (ref 70–99)
GLUCOSE BLD-MCNC: 294 MG/DL (ref 70–99)
GLUCOSE BLD-MCNC: 296 MG/DL (ref 70–99)
GLUCOSE SERPL-MCNC: 186 MG/DL (ref 70–99)
GLUCOSE SERPL-MCNC: 275 MG/DL (ref 70–99)
GLUCOSE SERPL-MCNC: 339 MG/DL (ref 70–99)
HBA1C MFR BLD: 9.1 %
HCO3 BLDV-SCNC: 22.5 MMOL/L (ref 23–29)
HCT VFR BLD AUTO: 33.5 % (ref 40.5–52.5)
HGB BLD-MCNC: 11.2 G/DL (ref 13.5–17.5)
LYMPHOCYTES # BLD: 0.7 K/UL (ref 1–5.1)
LYMPHOCYTES NFR BLD: 10.1 %
MAGNESIUM SERPL-MCNC: 1.6 MG/DL (ref 1.8–2.4)
MAGNESIUM SERPL-MCNC: 2.1 MG/DL (ref 1.8–2.4)
MCH RBC QN AUTO: 29.1 PG (ref 26–34)
MCHC RBC AUTO-ENTMCNC: 33.5 G/DL (ref 31–36)
MCV RBC AUTO: 87 FL (ref 80–100)
METHGB MFR BLDV: 0.3 %
MONOCYTES # BLD: 0.6 K/UL (ref 0–1.3)
MONOCYTES NFR BLD: 8.9 %
NEUTROPHILS # BLD: 5.6 K/UL (ref 1.7–7.7)
NEUTROPHILS NFR BLD: 80.8 %
O2 CT VFR BLDV CALC: 16 VOL %
O2 THERAPY: ABNORMAL
PCO2 BLDV: 36.6 MMHG (ref 40–50)
PERFORMED ON: ABNORMAL
PH BLDV: 7.4 [PH] (ref 7.35–7.45)
PHOSPHATE SERPL-MCNC: 2.6 MG/DL (ref 2.5–4.9)
PHOSPHATE SERPL-MCNC: 3 MG/DL (ref 2.5–4.9)
PLATELET # BLD AUTO: 116 K/UL (ref 135–450)
PMV BLD AUTO: 10.6 FL (ref 5–10.5)
PO2 BLDV: 141 MMHG (ref 25–40)
POTASSIUM SERPL-SCNC: 3.7 MMOL/L (ref 3.5–5.1)
POTASSIUM SERPL-SCNC: 3.9 MMOL/L (ref 3.5–5.1)
POTASSIUM SERPL-SCNC: 4.5 MMOL/L (ref 3.5–5.1)
PROCALCITONIN SERPL IA-MCNC: 4.5 NG/ML (ref 0–0.15)
PTH-INTACT SERPL-MCNC: 321.1 PG/ML (ref 14–72)
RBC # BLD AUTO: 3.85 M/UL (ref 4.2–5.9)
SAO2 % BLDV: 100 %
SARS-COV-2 RNA RESP QL NAA+PROBE: NOT DETECTED
SODIUM SERPL-SCNC: 131 MMOL/L (ref 136–145)
SODIUM SERPL-SCNC: 134 MMOL/L (ref 136–145)
SODIUM SERPL-SCNC: 135 MMOL/L (ref 136–145)
WBC # BLD AUTO: 6.9 K/UL (ref 4–11)

## 2024-04-18 PROCEDURE — 97165 OT EVAL LOW COMPLEX 30 MIN: CPT

## 2024-04-18 PROCEDURE — 6370000000 HC RX 637 (ALT 250 FOR IP): Performed by: NURSE PRACTITIONER

## 2024-04-18 PROCEDURE — 6370000000 HC RX 637 (ALT 250 FOR IP): Performed by: INTERNAL MEDICINE

## 2024-04-18 PROCEDURE — 87636 SARSCOV2 & INF A&B AMP PRB: CPT

## 2024-04-18 PROCEDURE — 83970 ASSAY OF PARATHORMONE: CPT

## 2024-04-18 PROCEDURE — 86706 HEP B SURFACE ANTIBODY: CPT

## 2024-04-18 PROCEDURE — 2580000003 HC RX 258: Performed by: INTERNAL MEDICINE

## 2024-04-18 PROCEDURE — 2500000003 HC RX 250 WO HCPCS: Performed by: INTERNAL MEDICINE

## 2024-04-18 PROCEDURE — 87077 CULTURE AEROBIC IDENTIFY: CPT

## 2024-04-18 PROCEDURE — 2060000000 HC ICU INTERMEDIATE R&B

## 2024-04-18 PROCEDURE — 94669 MECHANICAL CHEST WALL OSCILL: CPT

## 2024-04-18 PROCEDURE — 83735 ASSAY OF MAGNESIUM: CPT

## 2024-04-18 PROCEDURE — 36415 COLL VENOUS BLD VENIPUNCTURE: CPT

## 2024-04-18 PROCEDURE — 87186 SC STD MICRODIL/AGAR DIL: CPT

## 2024-04-18 PROCEDURE — 84145 PROCALCITONIN (PCT): CPT

## 2024-04-18 PROCEDURE — 97161 PT EVAL LOW COMPLEX 20 MIN: CPT

## 2024-04-18 PROCEDURE — 97530 THERAPEUTIC ACTIVITIES: CPT

## 2024-04-18 PROCEDURE — 88112 CYTOPATH CELL ENHANCE TECH: CPT

## 2024-04-18 PROCEDURE — 6360000002 HC RX W HCPCS: Performed by: INTERNAL MEDICINE

## 2024-04-18 PROCEDURE — 6360000002 HC RX W HCPCS: Performed by: STUDENT IN AN ORGANIZED HEALTH CARE EDUCATION/TRAINING PROGRAM

## 2024-04-18 PROCEDURE — 76705 ECHO EXAM OF ABDOMEN: CPT

## 2024-04-18 PROCEDURE — 82803 BLOOD GASES ANY COMBINATION: CPT

## 2024-04-18 PROCEDURE — 6370000000 HC RX 637 (ALT 250 FOR IP): Performed by: EMERGENCY MEDICINE

## 2024-04-18 PROCEDURE — 85025 COMPLETE CBC W/AUTO DIFF WBC: CPT

## 2024-04-18 PROCEDURE — 87340 HEPATITIS B SURFACE AG IA: CPT

## 2024-04-18 PROCEDURE — 90945 DIALYSIS ONE EVALUATION: CPT

## 2024-04-18 PROCEDURE — 88305 TISSUE EXAM BY PATHOLOGIST: CPT

## 2024-04-18 PROCEDURE — 73630 X-RAY EXAM OF FOOT: CPT

## 2024-04-18 PROCEDURE — 2700000000 HC OXYGEN THERAPY PER DAY

## 2024-04-18 PROCEDURE — 84100 ASSAY OF PHOSPHORUS: CPT

## 2024-04-18 PROCEDURE — 87185 SC STD ENZYME DETCJ PER NZM: CPT

## 2024-04-18 PROCEDURE — 87205 SMEAR GRAM STAIN: CPT

## 2024-04-18 PROCEDURE — 87641 MR-STAPH DNA AMP PROBE: CPT

## 2024-04-18 PROCEDURE — 87070 CULTURE OTHR SPECIMN AEROBIC: CPT

## 2024-04-18 PROCEDURE — 6370000000 HC RX 637 (ALT 250 FOR IP): Performed by: STUDENT IN AN ORGANIZED HEALTH CARE EDUCATION/TRAINING PROGRAM

## 2024-04-18 PROCEDURE — 97535 SELF CARE MNGMENT TRAINING: CPT

## 2024-04-18 PROCEDURE — 80048 BASIC METABOLIC PNL TOTAL CA: CPT

## 2024-04-18 RX ORDER — SODIUM CHLORIDE, SODIUM LACTATE, CALCIUM CHLORIDE, MAGNESIUM CHLORIDE AND DEXTROSE 1.5; 538; 448; 18.3; 5.08 G/100ML; MG/100ML; MG/100ML; MG/100ML; MG/100ML
2500 INJECTION, SOLUTION INTRAPERITONEAL CONTINUOUS
Status: DISCONTINUED | OUTPATIENT
Start: 2024-04-18 | End: 2024-04-18

## 2024-04-18 RX ORDER — LEVETIRACETAM 500 MG/5ML
500 INJECTION, SOLUTION, CONCENTRATE INTRAVENOUS ONCE
Status: DISCONTINUED | OUTPATIENT
Start: 2024-04-18 | End: 2024-04-18

## 2024-04-18 RX ORDER — INSULIN GLARGINE 100 [IU]/ML
22 INJECTION, SOLUTION SUBCUTANEOUS NIGHTLY
Status: DISCONTINUED | OUTPATIENT
Start: 2024-04-18 | End: 2024-04-18

## 2024-04-18 RX ORDER — ASPIRIN 81 MG/1
81 TABLET ORAL DAILY
Status: DISCONTINUED | OUTPATIENT
Start: 2024-04-18 | End: 2024-04-21 | Stop reason: HOSPADM

## 2024-04-18 RX ORDER — DEXTROSE MONOHYDRATE 100 MG/ML
INJECTION, SOLUTION INTRAVENOUS CONTINUOUS PRN
Status: DISCONTINUED | OUTPATIENT
Start: 2024-04-18 | End: 2024-04-18

## 2024-04-18 RX ORDER — INSULIN LISPRO 100 [IU]/ML
5 INJECTION, SOLUTION INTRAVENOUS; SUBCUTANEOUS ONCE
Status: DISCONTINUED | OUTPATIENT
Start: 2024-04-18 | End: 2024-04-18

## 2024-04-18 RX ORDER — LANOLIN ALCOHOL/MO/W.PET/CERES
3 CREAM (GRAM) TOPICAL NIGHTLY
Status: DISCONTINUED | OUTPATIENT
Start: 2024-04-18 | End: 2024-04-21 | Stop reason: HOSPADM

## 2024-04-18 RX ORDER — ONDANSETRON 2 MG/ML
4 INJECTION INTRAMUSCULAR; INTRAVENOUS EVERY 6 HOURS PRN
Status: DISCONTINUED | OUTPATIENT
Start: 2024-04-18 | End: 2024-04-21 | Stop reason: HOSPADM

## 2024-04-18 RX ORDER — INSULIN LISPRO 100 [IU]/ML
0-4 INJECTION, SOLUTION INTRAVENOUS; SUBCUTANEOUS EVERY 4 HOURS
Status: DISCONTINUED | OUTPATIENT
Start: 2024-04-18 | End: 2024-04-18

## 2024-04-18 RX ORDER — DEXTROSE, SODIUM CHLORIDE, SODIUM LACTATE, POTASSIUM CHLORIDE, AND CALCIUM CHLORIDE 5; .6; .31; .03; .02 G/100ML; G/100ML; G/100ML; G/100ML; G/100ML
INJECTION, SOLUTION INTRAVENOUS PRN
Status: DISCONTINUED | OUTPATIENT
Start: 2024-04-18 | End: 2024-04-18

## 2024-04-18 RX ORDER — SODIUM BICARBONATE 650 MG/1
1300 TABLET ORAL ONCE
Status: COMPLETED | OUTPATIENT
Start: 2024-04-18 | End: 2024-04-18

## 2024-04-18 RX ORDER — GLUCAGON 1 MG/ML
1 KIT INJECTION PRN
Status: DISCONTINUED | OUTPATIENT
Start: 2024-04-18 | End: 2024-04-21 | Stop reason: HOSPADM

## 2024-04-18 RX ORDER — CLOPIDOGREL BISULFATE 75 MG/1
75 TABLET ORAL DAILY
Status: DISCONTINUED | OUTPATIENT
Start: 2024-04-18 | End: 2024-04-21 | Stop reason: HOSPADM

## 2024-04-18 RX ORDER — NICOTINE 21 MG/24HR
1 PATCH, TRANSDERMAL 24 HOURS TRANSDERMAL DAILY
Status: DISCONTINUED | OUTPATIENT
Start: 2024-04-18 | End: 2024-04-18

## 2024-04-18 RX ORDER — ENOXAPARIN SODIUM 100 MG/ML
40 INJECTION SUBCUTANEOUS DAILY
Status: DISCONTINUED | OUTPATIENT
Start: 2024-04-18 | End: 2024-04-19

## 2024-04-18 RX ORDER — SODIUM CHLORIDE, SODIUM LACTATE, CALCIUM CHLORIDE, MAGNESIUM CHLORIDE AND DEXTROSE 1.5; 538; 448; 18.3; 5.08 G/100ML; MG/100ML; MG/100ML; MG/100ML; MG/100ML
2500 INJECTION, SOLUTION INTRAPERITONEAL CONTINUOUS
Status: DISCONTINUED | OUTPATIENT
Start: 2024-04-18 | End: 2024-04-20

## 2024-04-18 RX ORDER — GENTAMICIN SULFATE 1 MG/G
CREAM TOPICAL DAILY
Status: DISCONTINUED | OUTPATIENT
Start: 2024-04-18 | End: 2024-04-21 | Stop reason: HOSPADM

## 2024-04-18 RX ORDER — SODIUM BICARBONATE 650 MG/1
1300 TABLET ORAL 3 TIMES DAILY
Status: DISCONTINUED | OUTPATIENT
Start: 2024-04-18 | End: 2024-04-21 | Stop reason: HOSPADM

## 2024-04-18 RX ORDER — MAGNESIUM SULFATE 1 G/100ML
1000 INJECTION INTRAVENOUS
Status: COMPLETED | OUTPATIENT
Start: 2024-04-18 | End: 2024-04-18

## 2024-04-18 RX ORDER — RANOLAZINE 500 MG/1
500 TABLET, EXTENDED RELEASE ORAL 2 TIMES DAILY
Status: DISCONTINUED | OUTPATIENT
Start: 2024-04-18 | End: 2024-04-21 | Stop reason: HOSPADM

## 2024-04-18 RX ORDER — ACETAMINOPHEN 325 MG/1
650 TABLET ORAL EVERY 6 HOURS PRN
Status: DISCONTINUED | OUTPATIENT
Start: 2024-04-18 | End: 2024-04-21 | Stop reason: HOSPADM

## 2024-04-18 RX ORDER — LEVETIRACETAM 500 MG/1
500 TABLET ORAL 2 TIMES DAILY
Status: DISCONTINUED | OUTPATIENT
Start: 2024-04-18 | End: 2024-04-21 | Stop reason: HOSPADM

## 2024-04-18 RX ORDER — PROMETHAZINE HYDROCHLORIDE 25 MG/1
12.5 TABLET ORAL EVERY 6 HOURS PRN
Status: DISCONTINUED | OUTPATIENT
Start: 2024-04-18 | End: 2024-04-21 | Stop reason: HOSPADM

## 2024-04-18 RX ORDER — SODIUM CHLORIDE 0.9 % (FLUSH) 0.9 %
10 SYRINGE (ML) INJECTION PRN
Status: DISCONTINUED | OUTPATIENT
Start: 2024-04-18 | End: 2024-04-21 | Stop reason: HOSPADM

## 2024-04-18 RX ORDER — GUAIFENESIN 600 MG/1
600 TABLET, EXTENDED RELEASE ORAL 2 TIMES DAILY
Status: DISCONTINUED | OUTPATIENT
Start: 2024-04-18 | End: 2024-04-21 | Stop reason: HOSPADM

## 2024-04-18 RX ORDER — ACETAMINOPHEN 650 MG/1
650 SUPPOSITORY RECTAL EVERY 6 HOURS PRN
Status: DISCONTINUED | OUTPATIENT
Start: 2024-04-18 | End: 2024-04-21 | Stop reason: HOSPADM

## 2024-04-18 RX ORDER — INSULIN GLARGINE 100 [IU]/ML
18 INJECTION, SOLUTION SUBCUTANEOUS NIGHTLY
Status: DISCONTINUED | OUTPATIENT
Start: 2024-04-18 | End: 2024-04-21 | Stop reason: HOSPADM

## 2024-04-18 RX ORDER — INSULIN LISPRO 100 [IU]/ML
0-4 INJECTION, SOLUTION INTRAVENOUS; SUBCUTANEOUS EVERY 4 HOURS
Status: DISCONTINUED | OUTPATIENT
Start: 2024-04-18 | End: 2024-04-20

## 2024-04-18 RX ORDER — GLUCAGON 1 MG/ML
1 KIT INJECTION PRN
Status: DISCONTINUED | OUTPATIENT
Start: 2024-04-18 | End: 2024-04-18

## 2024-04-18 RX ORDER — SODIUM CHLORIDE 0.9 % (FLUSH) 0.9 %
10 SYRINGE (ML) INJECTION EVERY 12 HOURS SCHEDULED
Status: DISCONTINUED | OUTPATIENT
Start: 2024-04-18 | End: 2024-04-21 | Stop reason: HOSPADM

## 2024-04-18 RX ORDER — SODIUM CHLORIDE 9 MG/ML
INJECTION, SOLUTION INTRAVENOUS PRN
Status: DISCONTINUED | OUTPATIENT
Start: 2024-04-18 | End: 2024-04-21 | Stop reason: HOSPADM

## 2024-04-18 RX ORDER — BENZONATATE 100 MG/1
100 CAPSULE ORAL 3 TIMES DAILY
Status: DISCONTINUED | OUTPATIENT
Start: 2024-04-18 | End: 2024-04-21 | Stop reason: HOSPADM

## 2024-04-18 RX ADMIN — INSULIN GLARGINE 18 UNITS: 100 INJECTION, SOLUTION SUBCUTANEOUS at 01:00

## 2024-04-18 RX ADMIN — SODIUM CHLORIDE 25 ML: 9 INJECTION, SOLUTION INTRAVENOUS at 06:04

## 2024-04-18 RX ADMIN — SODIUM BICARBONATE: 84 INJECTION, SOLUTION INTRAVENOUS at 01:03

## 2024-04-18 RX ADMIN — SODIUM BICARBONATE 1300 MG: 650 TABLET ORAL at 20:24

## 2024-04-18 RX ADMIN — CALCIUM GLUCONATE 3000 MG: 98 INJECTION, SOLUTION INTRAVENOUS at 21:24

## 2024-04-18 RX ADMIN — LEVETIRACETAM 500 MG: 500 TABLET, FILM COATED ORAL at 20:24

## 2024-04-18 RX ADMIN — ACETAMINOPHEN 650 MG: 325 TABLET ORAL at 21:34

## 2024-04-18 RX ADMIN — BENZONATATE 100 MG: 100 CAPSULE ORAL at 20:06

## 2024-04-18 RX ADMIN — GENTAMICIN SULFATE: 1 CREAM TOPICAL at 20:13

## 2024-04-18 RX ADMIN — RANOLAZINE 500 MG: 500 TABLET, EXTENDED RELEASE ORAL at 20:45

## 2024-04-18 RX ADMIN — SODIUM CHLORIDE, PRESERVATIVE FREE 10 ML: 5 INJECTION INTRAVENOUS at 05:42

## 2024-04-18 RX ADMIN — SODIUM CHLORIDE, PRESERVATIVE FREE 10 ML: 5 INJECTION INTRAVENOUS at 07:50

## 2024-04-18 RX ADMIN — MELATONIN TAB 3 MG 3 MG: 3 TAB at 20:45

## 2024-04-18 RX ADMIN — SODIUM BICARBONATE 1300 MG: 650 TABLET ORAL at 00:18

## 2024-04-18 RX ADMIN — CEFTRIAXONE SODIUM 1000 MG: 1 INJECTION, POWDER, FOR SOLUTION INTRAMUSCULAR; INTRAVENOUS at 06:49

## 2024-04-18 RX ADMIN — DOXYCYCLINE 100 MG: 100 INJECTION, POWDER, LYOPHILIZED, FOR SOLUTION INTRAVENOUS at 20:04

## 2024-04-18 RX ADMIN — MAGNESIUM SULFATE HEPTAHYDRATE 1000 MG: 1 INJECTION, SOLUTION INTRAVENOUS at 05:34

## 2024-04-18 RX ADMIN — INSULIN GLARGINE 18 UNITS: 100 INJECTION, SOLUTION SUBCUTANEOUS at 20:46

## 2024-04-18 RX ADMIN — DOXYCYCLINE 100 MG: 100 INJECTION, POWDER, LYOPHILIZED, FOR SOLUTION INTRAVENOUS at 08:01

## 2024-04-18 RX ADMIN — GUAIFENESIN 600 MG: 600 TABLET, EXTENDED RELEASE ORAL at 21:34

## 2024-04-18 RX ADMIN — MAGNESIUM SULFATE HEPTAHYDRATE 1000 MG: 1 INJECTION, SOLUTION INTRAVENOUS at 04:25

## 2024-04-18 RX ADMIN — INSULIN LISPRO 2 UNITS: 100 INJECTION, SOLUTION INTRAVENOUS; SUBCUTANEOUS at 03:22

## 2024-04-18 RX ADMIN — CLOPIDOGREL BISULFATE 75 MG: 75 TABLET ORAL at 11:38

## 2024-04-18 RX ADMIN — BENZONATATE 100 MG: 100 CAPSULE ORAL at 07:53

## 2024-04-18 RX ADMIN — MAGNESIUM SULFATE HEPTAHYDRATE 1000 MG: 1 INJECTION, SOLUTION INTRAVENOUS at 03:22

## 2024-04-18 RX ADMIN — SODIUM CHLORIDE 25 ML: 9 INJECTION, SOLUTION INTRAVENOUS at 06:47

## 2024-04-18 RX ADMIN — RANOLAZINE 500 MG: 500 TABLET, EXTENDED RELEASE ORAL at 11:38

## 2024-04-18 RX ADMIN — BENZONATATE 100 MG: 100 CAPSULE ORAL at 13:57

## 2024-04-18 RX ADMIN — SERTRALINE HYDROCHLORIDE 50 MG: 50 TABLET ORAL at 11:38

## 2024-04-18 RX ADMIN — SODIUM BICARBONATE 1300 MG: 650 TABLET ORAL at 02:01

## 2024-04-18 RX ADMIN — SODIUM CHLORIDE 25 ML: 9 INJECTION, SOLUTION INTRAVENOUS at 20:02

## 2024-04-18 RX ADMIN — INSULIN LISPRO 2 UNITS: 100 INJECTION, SOLUTION INTRAVENOUS; SUBCUTANEOUS at 20:46

## 2024-04-18 RX ADMIN — SODIUM CHLORIDE, PRESERVATIVE FREE 10 ML: 5 INJECTION INTRAVENOUS at 20:06

## 2024-04-18 RX ADMIN — ASPIRIN 81 MG: 81 TABLET, COATED ORAL at 11:38

## 2024-04-18 RX ADMIN — ENOXAPARIN SODIUM 40 MG: 100 INJECTION SUBCUTANEOUS at 13:57

## 2024-04-18 ASSESSMENT — PAIN DESCRIPTION - LOCATION
LOCATION: GENERALIZED
LOCATION: BACK;CHEST
LOCATION: BACK;CHEST

## 2024-04-18 ASSESSMENT — PAIN DESCRIPTION - ONSET
ONSET: ON-GOING

## 2024-04-18 ASSESSMENT — PAIN DESCRIPTION - FREQUENCY
FREQUENCY: CONTINUOUS

## 2024-04-18 ASSESSMENT — PAIN SCALES - GENERAL
PAINLEVEL_OUTOF10: 0
PAINLEVEL_OUTOF10: 0
PAINLEVEL_OUTOF10: 3
PAINLEVEL_OUTOF10: 0
PAINLEVEL_OUTOF10: 0
PAINLEVEL_OUTOF10: 2
PAINLEVEL_OUTOF10: 2
PAINLEVEL_OUTOF10: 0
PAINLEVEL_OUTOF10: 3
PAINLEVEL_OUTOF10: 0
PAINLEVEL_OUTOF10: 0

## 2024-04-18 ASSESSMENT — PAIN DESCRIPTION - PAIN TYPE
TYPE: ACUTE PAIN

## 2024-04-18 ASSESSMENT — PAIN DESCRIPTION - DESCRIPTORS
DESCRIPTORS: ACHING;SORE
DESCRIPTORS: ACHING
DESCRIPTORS: STABBING;PRESSURE;SHARP

## 2024-04-18 ASSESSMENT — PAIN DESCRIPTION - ORIENTATION
ORIENTATION: LEFT;UPPER
ORIENTATION: UPPER

## 2024-04-18 NOTE — PROGRESS NOTES
Admit Date: 4/17/2024  Diet: ADULT DIET; Regular; 3 carb choices (45 gm/meal); Low Fat/Low Chol/High Fiber/2 gm Na; Low Sodium (2 gm)    CC: Multifocal pneumonia    Interval history:   Overnight, patient was febrile with a Tmax of 100.6 at 01 51. Patient's vitals remained stable    Patient was seen this morning in bed.  He endorsed that when he went home he started to have a lot of coughing, nausea and abdominal pain.  This led him to come back in for further evaluation.  He endorses that he feels a little better.  He claims he will try and eat today. Patient denies fevers, chills,  chest pain, shortness of breath, diarrhea, constipation, dysuria, urinary frequency or urgency.     Plan:     -Continue IV antibiotics with Rocephin + doxycycline  -Probiotics  -Strep pneumo, Legionella, respiratory culture, respiratory viral panel  -Check procal  -Recheck BMP to see if anion gap is still present or it has closed  -If anion gap still present, check lactic acid    Assessment:   Te Garay is a 59 y.o. male with PMH of poorly controlled DM1 uses insulin pump, has full sequela including nephropathy ESRD on PD, polyneuropathy with history of LLE debridement, CAD status post CABG, HFrEF, seizure disorder, mood disorder  who was admitted with multifocal pneumonia    Multifocal PNA  Patient endorses that when he went home his cough worsened he had worsening productive sputum.  On admission, he was febrile with a Tmax of 100.6.  CT chest showed right middle and bilateral lower lobe ground-glass opacities consistent   with multifocal pneumonia.  He was started on IV antibiotics.  Pro-Edin elevated to 4.50 from 0.32    ESRD on PD  Patient follows with nephrology in the outpatient setting. He is on PD at home.  -Nephrology consulted, appreciate recs     CAD  Patient is s/p CABG. Last echo with EF 35%. At home, patient is on Coreg, Entresto, oral Lasix, Imdur, Ranexa, aspirin,      HLD  At home, patient is on Carvedilol

## 2024-04-18 NOTE — ED PROVIDER NOTES
multifocal pneumonia.   2. Distended gallbladder with gallstones. If there is concern for acute   cholecystitis, right upper quadrant ultrasound is recommended.           CT CHEST ABDOMEN PELVIS WO CONTRAST Additional Contrast? None    Result Date: 4/17/2024  EXAMINATION: CT OF THE CHEST, ABDOMEN, AND PELVIS WITHOUT CONTRAST 4/17/2024 8:01 pm TECHNIQUE: CT of the chest, abdomen and pelvis was performed without the administration of intravenous contrast. Multiplanar reformatted images are provided for review. Automated exposure control, iterative reconstruction, and/or weight based adjustment of the mA/kV was utilized to reduce the radiation dose to as low as reasonably achievable. COMPARISON: CT abdomen and pelvis dated 08/22/2023 HISTORY: ORDERING SYSTEM PROVIDED HISTORY: cough, nausea, vomiting, diarrhea TECHNOLOGIST PROVIDED HISTORY: Reason for exam:->cough, nausea, vomiting, diarrhea Additional Contrast?->None Decision Support Exception - unselect if not a suspected or confirmed emergency medical condition->Emergency Medical Condition (MA) Reason for Exam: cough, nausea, vomiting, diarrhea FINDINGS: THYROID: The visualized thyroid gland is unremarkable. CARDIOVASCULAR: The heart is normal in size.  There is no pericardial effusion.  The aorta is normal in course and caliber.  The main pulmonary artery is normal in caliber.  There are atherosclerotic calcifications of the aorta and its branch vessels. LUNGS/PLEURA: There is right middle and bilateral lower lobe ground-glass opacities consistent with multifocal pneumonia.  There is no pneumothorax. The trachea and central bronchi are patent. THORACIC NODES: There are no pathologically enlarged mediastinal, hilar or axillary lymph nodes. HEPATOBILIARY: There are no focal hepatic lesions.  The gallbladder is distended and contains gallstones.  There is no biliary ductal dilatation. SPLEEN: Unremarkable. PANCREAS: Unremarkable. ADRENAL GLANDS: Unremarkable. KIDNEYS:  disease, history of ischemic cardiomyopathy.  Otherwise does not meet criteria for sepsis and otherwise does not require the 30 mg/kg fluid bolus    Repeat BMP was obtained, which does show a glucose of 339, with a gap of 22, and CO2 of 16, his VBG does show a acidosis of 7.3, beta hydroxybutyrate is elevated, due to this we will start on insulin drip.  Due to concern of early DKA multifocal pneumonia, feel he would benefit from admission, hospitalist NP to admission, patient and family updated, agreeable with plan, stable for admission    I am the Primary Clinician of Record.  FINAL IMPRESSION      1. Diabetic ketoacidosis without coma associated with other specified diabetes mellitus (HCC)    2. Multifocal pneumonia    3. Calculus of gallbladder without cholecystitis without obstruction    4. Peritoneal dialysis status (HCC)          DISPOSITION/PLAN     DISPOSITION Admitted 04/18/2024 12:32:55 AM      PATIENT REFERRED TO:  No follow-up provider specified.    DISCHARGE MEDICATIONS:  Current Discharge Medication List          DISCONTINUED MEDICATIONS:  Current Discharge Medication List                 (Please note that portions of this note were completed with a voice recognition program.  Efforts were made to edit the dictations but occasionally words are mis-transcribed.)    Lucia Goldman PA-C (electronically signed)        Lucia Goldman PA-C  04/18/24 0259

## 2024-04-18 NOTE — FLOWSHEET NOTE
MRSA nasal swab not resulted. Called lab to check for specimen collection; no specimen in lab. Collected sample and sent to lab.

## 2024-04-18 NOTE — FLOWSHEET NOTE
Dr. Pederson called & patient's careplan d/w RN in detail. MD placed new orders and does not want pt to have IVF NS which originally ordered from the ED, and he does not want nephrology to be called now for pt to start PD. MD states PD can be done during the day, and Nephrology consult can be called in AM. Labs placed as \"TIMED\" for 0500 and IV bicarb to be stopped at 0600 (after total of 5hr IV infusion completed.)     MD aware that pt was asleep, awakened per RN to take PO sodium bicarb, and pt wanting to go back to sleep. Melatonin to be held for now d/t pt sleeping. See MAR & all flowsheets.

## 2024-04-18 NOTE — PROGRESS NOTES
Physician Progress Note      PATIENT:               DIDIER RUVALCABA  CSN #:                  [id number]  :                       1965  ADMIT DATE:       2024 7:36 PM  DISCH DATE:  RESPONDING  PROVIDER #:        IQRA EUBANKS MD          QUERY TEXT:    Patient admitted with fever/cough/weakness. Documentation reflects \"DKA\" in ED   provider and nephrology consult notes.  If possible, please document in the   progress notes and discharge summary if DKA was:    The medical record reflects the following:  Risk Factors: type 1 DM, ESRD, PNA, CHF    Clinical Indicators: Per ED provider note -\" Due to concern of early DKA   multifocal pneumonia, feel he would benefit from admission\"  Per H/P-\"Acidosis: Suspected in setting of acidosis use...Blood glucose in the   200s and the patient is not on SGLT2 to consider euglycemic DKA currently\"  Per Nephrology consult -\"DKA /abdominal pain\"  AG 20->22->16  Per attending note -\"Type 1 diabetes-uncontrolled\"    Treatment: Bicarb, IVF, insulin sliding scale, antibiotics, labs, supportive   care    Thank you,  Apurva Lassiter RN BSN CRCR CCDS  [email or web address]  Options provided:  -- Type 1 DM with DKA treated and resolved  -- Type 1 DM with DKA ruled out after study  -- Other - I will add my own diagnosis  -- Disagree - Not applicable / Not valid  -- Disagree - Clinically unable to determine / Unknown  -- Refer to Clinical Documentation Reviewer    PROVIDER RESPONSE TEXT:    Type 1 DM with DKA treated and resolved.    Query created by: Nidia Lassiter on 2024 2:03 PM      Electronically signed by:  IQRA EUBANKS MD 2024 5:25 PM

## 2024-04-18 NOTE — PROGRESS NOTES
Physical/Occupational Therapy  [facility]    Orders received, chart reviewed. Attempted PT/OT evaluation this date. Pt currently off floor. Will re-attempt evaluation later this date as schedule allows.     Hilary Madison PT, DPT [id number]   Clementina Lombardo [id number]

## 2024-04-18 NOTE — H&P
[facility] with past medical history of type 2 diabetes, hypertension, seizure presented to ED with chief complaint of nausea vomiting and fever    Patient reported has been having some fever chills with decreased oral intake reports that he has a insulin pump and has been using however report that his sensor is not working and I had to do traditional Accu-Cheks.  Patient also reports admitting to PD dialysis.  Patient otherwise has no current complaints of chest pain shortness of breata    Past Medical History:          Diagnosis Date    Angina at rest ([facility])     Cardiomyopathy ([facility])     Carotid artery stenosis 10/25/2016    SUZANNA stented with 8 x 30 mm non-tapered Xact stent    CHF (congestive heart failure) ([facility]) 03/03/2015    EF 30%     CKD (chronic kidney disease) stage 2, GFR 60-89 ml/min     Coronary artery disease     sp CABG UC    Diabetic peripheral neuropathy ([facility])     Diastolic HF (heart failure) ([facility])     Hyperlipidemia with target LDL less than 70     Hypertension goal BP (blood pressure) < 130/80     PVD (peripheral vascular disease) ([facility])     Seizures ([facility])     in childhood    Type 1 diabetes mellitus with chronic kidney disease ([facility])     c-peptide <0.1 in 2015       Past Surgical History:          Procedure Laterality Date    BLADDER SURGERY Left 08/24/2023    CYSTOSCOPY, LEFT URETEROSCOPY, STONE BASKET MANIPULATION, PLACEMENT OF LEFT URETERAL STENT performed by Chevy Sepulveda MD at [facility] OR    CARDIAC CATHETERIZATION      CARDIAC SURGERY  2001    triple bypass at     CAROTID STENT PLACEMENT Right     CORONARY ARTERY BYPASS GRAFT      FOOT DEBRIDEMENT Left 10/16/2023    LEFT FOOT DEBRIDEMENT INCISION AND DRAINAGE WITH BONE BIOPSY performed by David Lowe DPM at [facility] OR    FOOT DEBRIDEMENT Left 10/23/2023    LEFT FOOT INCISION AND DRAINAGE WITH DELAYED PRIMARY CLOSURE AND DERMAL GRAFT APPLICATION performed by David Lowe DPM at [facility] OR    HERNIA REPAIR      infant     LAPAROSCOPY INSERTION PERITONEAL CATHETER N/A 06/03/2020    LAPAROSCOPIC PLACEMENT OF PERITONEAL DIALYSIS  CATHETER performed by Tristen Palacios MD at [facility] OR    TONSILLECTOMY         Medications Prior to Admission:      Prior to Admission medications    Medication Sig Start Date End Date Taking? Authorizing Provider   benzonatate (TESSALON) 100 MG capsule Take 1 capsule by mouth 3 times daily as needed for Cough 4/17/24 4/27/24  Jeyson Fernandez MD   benzonatate (TESSALON) 100 MG capsule Take 1 capsule by mouth 3 times daily as needed for Cough  Patient not taking: Reported on 4/16/2024 3/8/24   Amandeep Barlow MD   Continuous Blood Gluc Sensor (DEXCOM G6 SENSOR) MISC Change sensor every 10 days 2/20/24   Yves Castillo MD   nitroGLYCERIN (NITROSTAT) 0.4 MG SL tablet DISSOLVE 1 TABLET UNDER TONGUE FOR CHEST PAIN- IF PAIN REMAINS AFTER 5 MINS, CALL 911 AND REPEAT DOSE MAX 3 TABLETS IN 15 MINUTES 12/5/23   Yamila Ford APRN - CNP   levETIRAcetam (KEPPRA) 500 MG tablet Take 1 tablet by mouth 2 times daily 11/24/23   Dae Jason MD   sertraline (ZOLOFT) 50 MG tablet Take 1 tablet by mouth daily 11/25/23   Dae Jason MD   carvedilol (COREG) 25 MG tablet Take 1 tablet by mouth 2 times daily 11/24/23   Dae Jason MD   ENTRESTO 49-51 MG per tablet TAKE 1 TABLET BY MOUTH TWICE  DAILY 11/9/23   Yamila Ford APRN - CNP   furosemide (LASIX) 40 MG tablet Take 1 tablet by mouth in the morning and 1 tablet in the evening. 10/24/23   Azul Ramsay APRN - CNP   insulin lispro (HUMALOG) 100 UNIT/ML SOLN injection vial Use 30-40 units daily via pump  Patient taking differently: 30-40 Units by Insulin Pump - SubCUTAneous Infusion route continuous 10/17/23   Yves Castillo MD   lactulose (CHRONULAC) 10 GM/15ML solution Take 30 mLs by mouth 3 times daily as needed for Constipation 7/13/23   ProviderYovany MD   isosorbide mononitrate (IMDUR) 30 MG extended release tablet

## 2024-04-18 NOTE — CARE COORDINATION
04/18/24 1449   Readmission Assessment   Number of Days since last admission? 1-7 days   Previous Disposition Home with Family   Who is being Interviewed Patient   What was the patient's/caregiver's perception as to why they think they needed to return back to the hospital? Other (Comment)  (The patient stated he cotinued to have cough, weakness and fever.)   Did you visit your Primary Care Physician after you left the hospital, before you returned this time? No   Why weren't you able to visit your PCP? Did not have an appointment   Did you see a specialist, such as Cardiac, Pulmonary, Orthopedic Physician, etc. after you left the hospital? No   Who advised the patient to return to the hospital? Self-referral   Does the patient report anything that got in the way of taking their medications? No   In our efforts to provide the best possible care to you and others like you, can you think of anything that we could have done to help you after you left the hospital the first time, so that you might not have needed to return so soon? Other (Comment)  (The patient stated he feels his orginal issue was never addressed. The patient stated there should of been more attention paid to his infection that he had dyring his previous admission.)

## 2024-04-18 NOTE — ACP (ADVANCE CARE PLANNING)
Advanced Care Planning Note    Purpose of Encounter: Advanced care planning in light of multifocal PNA  Parties In Attendance: Patient, Sachi (Healthcare POA)  Decisional Capacity: Yes  Subjective: Patient has cough with sputum production  Objective: Procal 4.50  Goals of Care Determination: Patient wants full support  Plan:  DKA protocol  Code Status: Full         Time spent on Advanced care Plannin minutes  Advanced Care Planning Documents: Completed advanced directives on chart, Sachi, is the Healthcare POA.    Jeyson Fernandez MD  2024 2:06 PM

## 2024-04-18 NOTE — FLOWSHEET NOTE
US called for pt to have GB US today. Informed that pt had sips water with 2 pills at 0200 & confirmed this would be ok and not interfere with testing. Keep pt NPO for US

## 2024-04-18 NOTE — DISCHARGE INSTRUCTIONS
Please call and make an appointment with your PCP within 1 week  Please call and make an appointment with Nephrology  Please take all your medications as prescribed including any new ones on discharge    Podiatry Wound Care Discharge Instructions  Please perform every day dressing changes to left lower extremity as follows  -Apply saline soaked gauze to the wound on the wound on the left foot  -Next apply gauze to the top of the left foot, ankle, and leg  -Next loosely wrap the left lower extremity with Kerlix starting from just in front of the toes and ending just below the knee  -Next gently wrap the left foot with 4 inch Ace bandage starting from just in front of the toes and ending just above the ankle    Patient is heel weightbearing Left lower extremity with surgical shoe  Please follow-up with Dr. David Lowe DPM for continued wound care

## 2024-04-18 NOTE — ED NOTES
How does patient ambulate?   []Low Fall Risk (ambulates by themselves without support)  [x]Stand by assist   []Contact Guard   []Front wheel walker  []Wheelchair   []Steady  []Bed bound  []History of Lower Extremity Amputation  []Unknown, did not assess in the emergency department   How does patient take pills?  [x]Whole with Water  []Crushed in applesauce  []Crushed in pudding  []Other  []Unknown no oral medications were given in the ED  Is patient alert?   [x]Alert  []Drowsy but responds to voice  []Doesn't respond to voice but responds to painful stimuli  []Unresponsive  Is patient oriented?   [x]To person  [x]To place  [x]To time  [x]To situation  []Confused  []Agitated  []Follows commands  If patient is disoriented or from a Skill Nursing Facility has family been notified of admission?   [x]Yes   []No  Patient belongings?   []Cell phone  []Wallet   []Dentures  []Clothing  Any specific patient or family belongings/needs/dynamics?   Patient has an insulin pump  Miscellaneous comments/pending orders?  Bicarb tablets once he gets to the floor. Repeat Blood sugar due at 0200.  If there are any additional questions please reach out to the Emergency Department.

## 2024-04-18 NOTE — FLOWSHEET NOTE
Received report from carrillo Rainey RN. Pt needs urine & stool specimen still; sputum sample sent today. Nephrology placed orders with HD, & HD RN Eunice here at bedside now obtaining VS, will collect PD fluid specimen, and will then start PD.

## 2024-04-18 NOTE — PROGRESS NOTES
Patient asking when his PD will begin on the cycler, call placed to dialysis nurse and she was unaware of needing to see the patient, Dialysis nurse stated the Nephro MD is suppose to call and let them know but he did not. Dialysis nurse Mecca said she will take care of it. Patient made aware and understands.

## 2024-04-18 NOTE — PROGRESS NOTES
abdomen  Pain Interventions: not applicable        Activities of Daily Living  Basic Activities of Daily Living  Grooming: stand by assistance Increased time to complete task  Grooming Comments: SBA for applying deodorant in stance   Upper Extremity Bathing: stand by assistance Increased time to complete task  Bathing Equipment: none  Bathing Comments: SBA for washing chest, face, and arms in stance at sink, increased time needed for thoroughness  Upper Extremity Dressing: stand by assistance  Dressing Equipment: none  Dressing Comments: SBA for donning/doffing gown  General Comments: Patient declined any further ADLs, patient with increased time needed for thoroughness and task completion   Instrumental Activities of Daily Living  No IADL completed on this date.    Functional Mobility  Bed Mobility:  Scooting: stand by assistance  Comments:  Transfers:  Sit to stand transfer:stand by assistance  Stand to sit transfer: stand by assistance  Comments:  Functional Mobility  Functional Mobility Activity: to/from bathroom, in room ~35ft   Device Use: no device  Required Assistance: stand by assistance, contact guard assistance  Comment: Patient with CGA with HHA progressing to SBA with no assistive device, patient with some increased unsteadiness due to recent procedure on LLE foot, patient stated his wife is bringing his surgical shoe tonight.    Balance:  Static Sitting Balance: fair (+): maintains balance at SBA/supervision without use of UE support  Dynamic Sitting Balance: fair (+): maintains balance at SBA/supervision without use of UE support  Static Standing Balance: fair (-): maintains balance at SBA with use of UE support  Dynamic Standing Balance: fair (-): maintains balance at CGA with use of UE support  Comments: No LOB    Other Therapeutic Interventions    Functional Outcomes  AM-PAC Inpatient Daily Activity Raw Score: 19                Cognition  WFL  Orientation:    alert and oriented x 4  Command  Following:   WFL     Education  Barriers To Learning: none  Patient Education: patient educated on goals, OT role and benefits, plan of care, precautions, ADL adaptive strategies, IADL safety, energy conservation, disease specific education, pressure relief, transfer training, discharge recommendations  Learning Assessment:  patient verbalizes and demonstrates understanding    Assessment  Activity Tolerance: Patient tolerated treatment well  Impairments Requiring Therapeutic Intervention: decreased functional mobility, decreased ADL status, decreased endurance, decreased balance  Prognosis: good  Clinical Assessment: Patient presents slightly below baseline, however no further skilled OT services needed following discharge from hospital  Safety Interventions: patient left in chair, chair alarm in place, call light within reach, gait belt, patient at risk for falls, and nurse notified    Plan  Frequency: 3-5 x/per week  Current Treatment Recommendations: balance training, functional mobility training, transfer training, endurance training, patient/caregiver education, ADL/self-care training, IADL training, pain management, home exercise program, and safety education    Goals  Patient Goals: Patient did not state    Short Term Goals:  Time Frame: discharge  Patient will complete lower body ADL at Independent   Patient will complete toileting at Independent   Patient will complete functional transfers at Independent   Patient will complete functional mobility at Independent     Above goals reviewed on 4/18/2024.  All goals are ongoing at this time unless indicated above.       Therapy Session Time     Individual Group Co-treatment   Time In    1409   Time Out    1447   Minutes    38        Timed Code Treatment Minutes:   23 minutes  Total Treatment Minutes:  38 minutes       Electronically Signed By: CANDELARIA Mejia/ZENA [id number]

## 2024-04-18 NOTE — CONSULTS
Nephrology Consult Note  [facility]  [phone]   [employer]        Reason for Consult: ESRD on PD    HISTORY OF PRESENT ILLNESS:         59-year-old male with history of DM on insulin pump, CMP, HTN, ESRD on PD, seizure is admitted with nausea vomiting, abdominal pain and fever.  Of note he was discharged yesterday when admitted with DKA.    Labs on admission showed DKA.  Imaging shows cholelithiasis, no evidence of cholecystitis.  CT CAP showed right middle and bilateral lower lobe multifocal pneumonia.    Patient seen and examined today.  He seems to be in mild to moderate distress.  Vitals okay.  No visitors present.  He complains of epigastric pain, back pain, fever  Past Medical History:        Diagnosis Date    Angina at rest ([facility])     Cardiomyopathy ([facility])     Carotid artery stenosis 10/25/2016    SUZANNA stented with 8 x 30 mm non-tapered Xact stent    CHF (congestive heart failure) ([facility]) 03/03/2015    EF 30%     CKD (chronic kidney disease) stage 2, GFR 60-89 ml/min     Coronary artery disease     sp CABG UC    Diabetic peripheral neuropathy ([facility])     Diastolic HF (heart failure) ([facility])     Hyperlipidemia with target LDL less than 70     Hypertension goal BP (blood pressure) < 130/80     PVD (peripheral vascular disease) ([facility])     Seizures ([facility])     in childhood    Type 1 diabetes mellitus with chronic kidney disease ([facility])     c-peptide <0.1 in 2015       Past Surgical History:        Procedure Laterality Date    BLADDER SURGERY Left 08/24/2023    CYSTOSCOPY, LEFT URETEROSCOPY, STONE BASKET MANIPULATION, PLACEMENT OF LEFT URETERAL STENT performed by Chevy Sepulveda MD at [facility] OR    CARDIAC CATHETERIZATION      CARDIAC SURGERY  2001    triple bypass at     CAROTID STENT PLACEMENT Right     CORONARY ARTERY BYPASS GRAFT      FOOT DEBRIDEMENT Left 10/16/2023    LEFT FOOT DEBRIDEMENT INCISION AND DRAINAGE WITH BONE BIOPSY performed by David Lowe DPM at [facility] OR     in the Last Year: Never true     Ran Out of Food in the Last Year: Never true   Transportation Needs: No Transportation Needs (4/16/2024)    PRAPARE - Transportation     Lack of Transportation (Medical): No     Lack of Transportation (Non-Medical): No   Physical Activity: Not on file   Stress: Not on file   Social Connections: Not on file   Intimate Partner Violence: Not on file   Housing Stability: Low Risk  (4/16/2024)    Housing Stability Vital Sign     Unable to Pay for Housing in the Last Year: No     Number of Places Lived in the Last Year: 1     Unstable Housing in the Last Year: No       Family History:       Problem Relation Age of Onset    Hypertension Mother     Seizures Mother         epilepsy    Coronary Art Dis Father     Diabetes Father     Heart Murmur Sister     Thyroid Disease Sister     Mult Sclerosis Brother     Arthritis Brother     Sleep Apnea Brother     Coronary Art Dis Paternal Grandmother     Diabetes Paternal Grandmother          REVIEW OF SYSTEMS:    The remainder of the ROS is negative except per HPI.    PHYSICAL EXAM:    Vitals:    BP (!) 145/76   Pulse 66   Temp 98.8 °F (37.1 °C) (Oral)   Resp 18   Ht 1.727 m (5' 8\")   Wt 91.8 kg (202 lb 6.1 oz)   SpO2 95%   BMI 30.77 kg/m²   I/O last 3 completed shifts:  In: 963 [P.O.:30; I.V.:883; IV Piggyback:50]  Out: -   I/O this shift:  In: 30 [P.O.:30]  Out: -     Physical Exam:  Gen: Resting in bed, mild to moderate distress.    HEENT: MMM, OP clear.  CV: RRR no m/r.  No S3.  Lungs: Clear bilaterally.  No rhonchi.  Abd: Soft, epigastric tenderness, +BS  Ext: No edema, no cyanosis  Skin: Warm.  No rashes appreciated.  : No TTP over bladder, nondistended.  Neuro: Alert and oriented x 3, nonfocal.  Joints: No erythema noted over joints.    DATA:    CBC:   Lab Results   Component Value Date/Time    WBC 6.9 04/18/2024 05:03 AM    RBC 3.85 04/18/2024 05:03 AM    HGB 11.2 04/18/2024 05:03 AM    HCT 33.5 04/18/2024 05:03 AM    MCV 87.0

## 2024-04-18 NOTE — PROGRESS NOTES
Shift assessment completed. Routine vitals stable.  Patient is awake, alert and oriented but falls back to sleep quickly. Respirations are easy and unlabored but noted with productive cough, sputum collected for culture. PRN Tessalon given per patient request. Patient had xray of left foot completed, patient suppose to have US of gall bladder so patient is NPO at this time. No c/o pain just the coughing.  Call light within reach.

## 2024-04-18 NOTE — ED PROVIDER NOTES
life-threatening clinical deterioration in the patient's condition requiring my urgent intervention.  Total critical care time with the patient was 35 minutes excluding separately reportable procedures.  Critical care required due to patients metabolic acidosis concerning for DKA and bilateral pneumonia.         Disposition:  1. Diabetic ketoacidosis without coma associated with other specified diabetes mellitus (HCC)    2. Multifocal pneumonia    3. Calculus of gallbladder without cholecystitis without obstruction    4. Peritoneal dialysis status (HCC)          Jarad Maddox MD  Attending Emergency Physician       Jarad Maddox MD  04/20/24 0687

## 2024-04-18 NOTE — CONSULTS
Department of Podiatry Consult Note  Resident      Reason for Consult:  Left foot Wound  Requesting Physician:  Dr. Ishmael Pederson MD    CHIEF COMPLAINT:  Left foot wound    HISTORY OF PRESENT ILLNESS:    The patient is a 59 y.o. male with significant past medical history as listed below who is consulted to podiatry for a left foot wound. The patient is well known to the podiatry service and is seen regularly in the outpatient setting with Dr. Lowe. The patient states that the wound has remained closed and he has been applying lotion to the area daily. He does note that the callus has felt more tender of the past few days. He contributes the wound to a spike in his blood sugar levels from being sick recently. Patient denies any N/V/F. He does admit to feeling \"off\" but contributes this to possible pneumonia and constant coughing. Patient denies any other pedal complaints today.    Past Medical History:        Diagnosis Date    Angina at rest ([facility])     Cardiomyopathy ([facility])     Carotid artery stenosis 10/25/2016    SUZANNA stented with 8 x 30 mm non-tapered Xact stent    CHF (congestive heart failure) ([facility]) 03/03/2015    EF 30%     CKD (chronic kidney disease) stage 2, GFR 60-89 ml/min     Coronary artery disease     sp CABG     Diabetic peripheral neuropathy ([facility])     Diastolic HF (heart failure) ([facility])     Hyperlipidemia with target LDL less than 70     Hypertension goal BP (blood pressure) < 130/80     PVD (peripheral vascular disease) ([facility])     Seizures ([facility])     in childhood    Type 1 diabetes mellitus with chronic kidney disease ([facility])     c-peptide <0.1 in 2015       Past Surgical History:        Procedure Laterality Date    BLADDER SURGERY Left 08/24/2023    CYSTOSCOPY, LEFT URETEROSCOPY, STONE BASKET MANIPULATION, PLACEMENT OF LEFT URETERAL STENT performed by Chevy Sepulveda MD at [facility] OR    CARDIAC CATHETERIZATION      CARDIAC SURGERY  2001    triple bypass at     CAROTID STENT PLACEMENT Right      reviewed, impressions noted above. No antibiotics necessary from podiatric standpoint; patient currently on Zosyn, Doxycycline, Ceftriaxone per primary team. Patient is to be partial heel weightbearing tin surgical shoe to the left foot. Podiatry will continue to follow patient while in house with local wound care.     The patient assessment and plan was discussed with Dr. David Lowe DPM.    Ema Albarran DPM   Podiatric Resident PGY1  [employer]  Pager number [phone]  4/18/2024, 3:44 PM

## 2024-04-18 NOTE — FLOWSHEET NOTE
Labs drawn; awaiting results. Pt sleepy, arouses with verbal stimuli to answer questions, but continues to go back to sleep and states being tired. Oriented x4.     Pt reports that he's been on PD for 4 and 1/2 years and knows how to do his PD exchanges. Pt still has the adapter on his PD catheter for exchanges done with equipment here at [facility]. MD ordered testing on PD fluid to be done before PD exchanges started (none ordered at this time.)    Pt denies need to urinate at this time and has a clean urinal at bedside for RN to obtain urine specimen for labs to be processed.     Magnesium infusing; see MAR & all flowsheets.

## 2024-04-18 NOTE — PLAN OF CARE
Problem: Pain  Goal: Verbalizes/displays adequate comfort level or baseline comfort level  Outcome: Progressing     Problem: Hematologic - Adult  Goal: Maintains hematologic stability  Outcome: Progressing      04/18/24 0558   Vitals   Temp 98.8 °F (37.1 °C)   Temp Source Oral   Pulse 73   Heart Rate Source Brachial;Radial   Respirations 20   BP (!) 153/71   MAP (Calculated) 98   BP Location Right upper arm   BP Upper/Lower Upper   BP Method Automatic   Patient Position Semi fowlers   Cardiac Rhythm Sinus rhythm   Pain Assessment   Pain Assessment 0-10   Pain Level 3   Pain Location Generalized   Pain Descriptors Aching   Pain Type Acute pain   Pain Frequency Continuous   Pain Onset On-going   Non-Pharmaceutical Pain Intervention(s) Declines   Opioid-Induced Sedation   POSS Score S   Oxygen Therapy   SpO2 96 %   Pulse Oximetry Type Intermittent   Pulse Oximeter Device Location Finger   O2 Device None (Room air)

## 2024-04-18 NOTE — FLOWSHEET NOTE
Messaged hospitalist Veronika Hernandez, HALLE via [employer] at 2:03 AM, \"Pt here from ED. ED RN reported that Dr. Pederson wanted to know accucheck at 0200. Accucheck 296 at 0201\"    Message marked as Read at 2:04 AM, and NP responded to RN, \"ok\" and \"msg sent.\"          RN messaged NP at 2:15 AM, \"Thank you. Also, IVF infusing is the bicarb gtt ordered & started in ED. Order for IVF NS says, \"When blood glucose equals 250 mg/dL or below, DISCONTINUE saline IV fluid using Per Protocol order mode and start using the dextrose containing IV fluid order previously placed as CONTINUOUS PRN. DO NOT restart saline infusion if subsequent blood glucose returns above 250 mg/dL.\" Do you want this IVF started IN ADDITION TO THE BICARB INFUSION?        Message marked as, \"Read 2:15 AM \"

## 2024-04-18 NOTE — PROGRESS NOTES
[facility] - Inpatient Rehabilitation Department   Phone: [phone]    Physical Therapy    [x] Initial Evaluation            [] Daily Treatment Note         [] Discharge Summary      Patient: Te Garay   : 1965   MRN: [id number]   Date of Service:  2024  Admitting Diagnosis: Acidosis  Current Admission Summary: Te Garay is a 59 y.o. male with PMH of poorly controlled DM1 uses insulin pump, has full sequela including nephropathy ESRD on PD, polyneuropathy with history of LLE debridement, CAD status post CABG, HFrEF, seizure disorder, mood disorder  who was admitted with multifocal pneumonia.   Past Medical History:  has a past medical history of Angina at rest ([facility]), Cardiomyopathy (HCC), Carotid artery stenosis, CHF (congestive heart failure) ([facility]), CKD (chronic kidney disease) stage 2, GFR 60-89 ml/min, Coronary artery disease, Diabetic peripheral neuropathy (HCC), Diastolic HF (heart failure) ([facility]), Hyperlipidemia with target LDL less than 70, Hypertension goal BP (blood pressure) < 130/80, PVD (peripheral vascular disease) ([facility]), Seizures ([facility]), and Type 1 diabetes mellitus with chronic kidney disease (HCC).  Past Surgical History:  has a past surgical history that includes Cardiac surgery (); Coronary artery bypass graft; Cardiac catheterization; hernia repair; Tonsillectomy; LAPAROSCOPY INSERTION PERITONEAL CATHETER (N/A, 2020); Carotid stent placement (Right); Bladder surgery (Left, 2023); Foot Debridement (Left, 10/16/2023); and Foot Debridement (Left, 10/23/2023).    Discharge Recommendations: Te Garay scored a  on the AM-PAC short mobility form.  At this time, no further PT is recommended upon discharge due to pt being near baseline mobility.  Recommend patient returns to prior setting with prior services.      DME Required For Discharge: DME to be determined pending patient progress  Precautions/Restrictions: high fall risk, contact

## 2024-04-18 NOTE — PROGRESS NOTES
Patient sitting up in the chair watching TV. Patient more alert this afternoon than this morning. Patient has no c/o abdominal pain or any N/V. Patient stated he feels better than he did yesterday.

## 2024-04-18 NOTE — CARE COORDINATION
Case Management Assessment  Initial Evaluation    Date/Time of Evaluation: 4/18/2024 3:06 PM  Assessment Completed by: ROBIN Louis    If patient is discharged prior to next notation, then this note serves as note for discharge by case management.    Patient Name: Te Garay                   YOB: 1965  Diagnosis: Acidosis [E87.20]  Peritoneal dialysis status (HCC) [Z99.2]  Calculus of gallbladder without cholecystitis without obstruction [K80.20]  Diabetic ketoacidosis without coma associated with other specified diabetes mellitus (HCC) [E13.10]  Multifocal pneumonia [J18.9]                   Date / Time: 4/17/2024  7:36 PM    Patient Admission Status: Inpatient   Readmission Risk (Low < 19, Mod (19-27), High > 27): Readmission Risk Score: 25.6    Current PCP: Reid Lei MD  PCP verified by CM? Yes    Chart Reviewed: Yes      History Provided by: Patient  Patient Orientation: Alert and Oriented    Patient Cognition: Alert    Hospitalization in the last 30 days (Readmission):  Yes    If yes, Readmission Assessment in CM Navigator will be completed.    Advance Directives:      Code Status: Full Code   Patient's Primary Decision Maker is: Named in Scanned ACP Document      Discharge Planning:    Patient lives with: Spouse/Significant Other, Children Type of Home: House  Primary Care Giver: Self  Patient Support Systems include: Children, Parent, Family Members   Current Financial resources: Medicare  Current community resources: Other (Comment) (Peritoneal dialysis at home)  Current services prior to admission: Durable Medical Equipment, Other (Comment) (The patient does peritoneal dialysis at home. States he gets his supplies through [employer])            Current DME: Cane, Walker (n/a)            Type of Home Care services:  None    ADLS  Prior functional level: Independent in ADLs/IADLs  Current functional level: Independent in ADLs/IADLs    PT AM-PAC: 18 /24  OT AM-PAC:

## 2024-04-18 NOTE — PROGRESS NOTES
[facility]  Diabetes Education   Progress Note       NAME:  Te Garay  MEDICAL RECORD NUMBER:  [id number]  AGE: 59 y.o.   GENDER: male  : 1965  TODAY'S DATE:  2024    Subjective   Reason for Diabetes Education Evaluation and Assessment: insulin pump    Visit Type: evaluation      Te Garay is a 59 y.o. male referred by:  [] Physician  [x] Nursing  [] Chart Review   [] Other:     Pt is not wearing insulin pump at this time.  States he has a continuous glucose sensor but not the transmitter so his sugars are sometimes high overnight while asleep on PD. Pt states he'd prefer to stay on hospital basal bolus regimen at this time.    PAST MEDICAL HISTORY        Diagnosis Date    Angina at rest ([facility])     Cardiomyopathy ([facility])     Carotid artery stenosis 10/25/2016    SUZANNA stented with 8 x 30 mm non-tapered Xact stent    CHF (congestive heart failure) ([facility]) 2015    EF 30%     CKD (chronic kidney disease) stage 2, GFR 60-89 ml/min     Coronary artery disease     sp CABG     Diabetic peripheral neuropathy ([facility])     Diastolic HF (heart failure) ([facility])     Hyperlipidemia with target LDL less than 70     Hypertension goal BP (blood pressure) < 130/80     PVD (peripheral vascular disease) ([facility])     Seizures ([facility])     in childhood    Type 1 diabetes mellitus with chronic kidney disease ([facility])     c-peptide <0.1 in        PAST SURGICAL HISTORY    Past Surgical History:   Procedure Laterality Date    BLADDER SURGERY Left 2023    CYSTOSCOPY, LEFT URETEROSCOPY, STONE BASKET MANIPULATION, PLACEMENT OF LEFT URETERAL STENT performed by Chevy Sepulveda MD at [facility] OR    CARDIAC CATHETERIZATION      CARDIAC SURGERY      triple bypass at     CAROTID STENT PLACEMENT Right     CORONARY ARTERY BYPASS GRAFT      FOOT DEBRIDEMENT Left 10/16/2023    LEFT FOOT DEBRIDEMENT INCISION AND DRAINAGE WITH BONE BIOPSY performed by David Lowe DPM at [facility] ASC OR    FOOT DEBRIDEMENT  Left 10/23/2023    LEFT FOOT INCISION AND DRAINAGE WITH DELAYED PRIMARY CLOSURE AND DERMAL GRAFT APPLICATION performed by David Lowe DPM at [facility] OR    HERNIA REPAIR      infant    LAPAROSCOPY INSERTION PERITONEAL CATHETER N/A 06/03/2020    LAPAROSCOPIC PLACEMENT OF PERITONEAL DIALYSIS  CATHETER performed by Tristen Palacios MD at [facility] OR    TONSILLECTOMY         FAMILY HISTORY    Family History   Problem Relation Age of Onset    Hypertension Mother     Seizures Mother         epilepsy    Coronary Art Dis Father     Diabetes Father     Heart Murmur Sister     Thyroid Disease Sister     Mult Sclerosis Brother     Arthritis Brother     Sleep Apnea Brother     Coronary Art Dis Paternal Grandmother     Diabetes Paternal Grandmother        SOCIAL HISTORY    Social History     Tobacco Use    Smoking status: Never    Smokeless tobacco: Never   Vaping Use    Vaping Use: Never used   Substance Use Topics    Alcohol use: Yes     Comment: one drink a month    Drug use: No       ALLERGIES    Allergies   Allergen Reactions    Iodides Anaphylaxis    Shellfish-Derived Products Anaphylaxis    Atorvastatin Calcium Rash       MEDICATIONS     sodium chloride flush  10 mL IntraVENous 2 times per day    melatonin  3 mg Oral Nightly    insulin glargine  18 Units SubCUTAneous Nightly    insulin lispro  0-4 Units SubCUTAneous Q4H    aspirin  81 mg Oral Daily    clopidogrel  75 mg Oral Daily    levETIRAcetam  500 mg Oral BID    ranolazine  500 mg Oral BID    sertraline  50 mg Oral Daily    doxycycline (VIBRAMYCIN) IV  100 mg IntraVENous Q12H    cefTRIAXone (ROCEPHIN) IV  1,000 mg IntraVENous Q24H    benzonatate  100 mg Oral TID    gentamicin   Topical Daily    enoxaparin  40 mg SubCUTAneous Daily       Objective        Patient Active Problem List   Diagnosis Code    Coronary artery disease due to lipid rich plaque I25.10, I25.83    Hyperlipidemia E78.5    CHF (congestive heart failure) (HCC) I50.9    Hypertension due to end

## 2024-04-19 LAB
ALBUMIN SERPL-MCNC: 2.9 G/DL (ref 3.4–5)
ALBUMIN/GLOB SERPL: 0.9 {RATIO} (ref 1.1–2.2)
ALP SERPL-CCNC: 67 U/L (ref 40–129)
ALT SERPL-CCNC: 8 U/L (ref 10–40)
ANION GAP SERPL CALCULATED.3IONS-SCNC: 17 MMOL/L (ref 3–16)
AST SERPL-CCNC: 16 U/L (ref 15–37)
BACTERIA URNS QL MICRO: ABNORMAL /HPF
BASOPHILS # BLD: 0 K/UL (ref 0–0.2)
BASOPHILS NFR BLD: 0.2 %
BILIRUB SERPL-MCNC: 0.3 MG/DL (ref 0–1)
BILIRUB UR QL STRIP.AUTO: NEGATIVE
BUN SERPL-MCNC: 54 MG/DL (ref 7–20)
C DIFF TOX A+B STL QL IA: NORMAL
CALCIUM SERPL-MCNC: 8.2 MG/DL (ref 8.3–10.6)
CHLORIDE SERPL-SCNC: 96 MMOL/L (ref 99–110)
CLARITY UR: ABNORMAL
CO2 SERPL-SCNC: 20 MMOL/L (ref 21–32)
COLOR UR: YELLOW
CREAT SERPL-MCNC: 8.4 MG/DL (ref 0.9–1.3)
DEPRECATED RDW RBC AUTO: 16.4 % (ref 12.4–15.4)
EKG ATRIAL RATE: 92 BPM
EKG DIAGNOSIS: NORMAL
EKG P AXIS: 67 DEGREES
EKG P-R INTERVAL: 136 MS
EKG Q-T INTERVAL: 374 MS
EKG QRS DURATION: 96 MS
EKG QTC CALCULATION (BAZETT): 462 MS
EKG R AXIS: 121 DEGREES
EKG T AXIS: -30 DEGREES
EKG VENTRICULAR RATE: 92 BPM
EOSINOPHIL # BLD: 0.1 K/UL (ref 0–0.6)
EOSINOPHIL NFR BLD: 1 %
EPI CELLS #/AREA URNS AUTO: 8 /HPF (ref 0–5)
GFR SERPLBLD CREATININE-BSD FMLA CKD-EPI: 7 ML/MIN/{1.73_M2}
GLUCOSE BLD-MCNC: 169 MG/DL (ref 70–99)
GLUCOSE BLD-MCNC: 189 MG/DL (ref 70–99)
GLUCOSE BLD-MCNC: 238 MG/DL (ref 70–99)
GLUCOSE BLD-MCNC: 253 MG/DL (ref 70–99)
GLUCOSE BLD-MCNC: 262 MG/DL (ref 70–99)
GLUCOSE BLD-MCNC: 331 MG/DL (ref 70–99)
GLUCOSE SERPL-MCNC: 254 MG/DL (ref 70–99)
GLUCOSE UR STRIP.AUTO-MCNC: 500 MG/DL
HBV SURFACE AB SERPL IA-ACNC: 170.6 MIU/ML
HBV SURFACE AG SERPL QL IA: NORMAL
HCT VFR BLD AUTO: 36.4 % (ref 40.5–52.5)
HGB BLD-MCNC: 12 G/DL (ref 13.5–17.5)
HGB UR QL STRIP.AUTO: ABNORMAL
HYALINE CASTS #/AREA URNS AUTO: 1 /LPF (ref 0–8)
KETONES UR STRIP.AUTO-MCNC: ABNORMAL MG/DL
LEUKOCYTE ESTERASE UR QL STRIP.AUTO: NEGATIVE
LYMPHOCYTES # BLD: 0.7 K/UL (ref 1–5.1)
LYMPHOCYTES NFR BLD: 10.4 %
MAGNESIUM SERPL-MCNC: 2.2 MG/DL (ref 1.8–2.4)
MCH RBC QN AUTO: 28.8 PG (ref 26–34)
MCHC RBC AUTO-ENTMCNC: 33 G/DL (ref 31–36)
MCV RBC AUTO: 87.2 FL (ref 80–100)
MONOCYTES # BLD: 0.6 K/UL (ref 0–1.3)
MONOCYTES NFR BLD: 8.3 %
MRSA DNA SPEC QL NAA+PROBE: NORMAL
NEUTROPHILS # BLD: 5.6 K/UL (ref 1.7–7.7)
NEUTROPHILS NFR BLD: 80.1 %
NITRITE UR QL STRIP.AUTO: NEGATIVE
PERFORMED ON: ABNORMAL
PH UR STRIP.AUTO: 5 [PH] (ref 5–8)
PLATELET # BLD AUTO: 115 K/UL (ref 135–450)
PMV BLD AUTO: 10.3 FL (ref 5–10.5)
POTASSIUM SERPL-SCNC: 3.5 MMOL/L (ref 3.5–5.1)
PROT SERPL-MCNC: 6.1 G/DL (ref 6.4–8.2)
PROT UR STRIP.AUTO-MCNC: 100 MG/DL
RBC # BLD AUTO: 4.17 M/UL (ref 4.2–5.9)
RBC CLUMPS #/AREA URNS AUTO: 2 /HPF (ref 0–4)
SODIUM SERPL-SCNC: 133 MMOL/L (ref 136–145)
SP GR UR STRIP.AUTO: 1.01 (ref 1–1.03)
UA COMPLETE W REFLEX CULTURE PNL UR: ABNORMAL
UA DIPSTICK W REFLEX MICRO PNL UR: YES
URN SPEC COLLECT METH UR: ABNORMAL
UROBILINOGEN UR STRIP-ACNC: 0.2 E.U./DL
WBC # BLD AUTO: 7 K/UL (ref 4–11)
WBC #/AREA URNS AUTO: 3 /HPF (ref 0–5)

## 2024-04-19 PROCEDURE — 94760 N-INVAS EAR/PLS OXIMETRY 1: CPT

## 2024-04-19 PROCEDURE — 94669 MECHANICAL CHEST WALL OSCILL: CPT

## 2024-04-19 PROCEDURE — 87324 CLOSTRIDIUM AG IA: CPT

## 2024-04-19 PROCEDURE — 80053 COMPREHEN METABOLIC PANEL: CPT

## 2024-04-19 PROCEDURE — 85025 COMPLETE CBC W/AUTO DIFF WBC: CPT

## 2024-04-19 PROCEDURE — 6370000000 HC RX 637 (ALT 250 FOR IP): Performed by: INTERNAL MEDICINE

## 2024-04-19 PROCEDURE — 6360000002 HC RX W HCPCS: Performed by: INTERNAL MEDICINE

## 2024-04-19 PROCEDURE — 90945 DIALYSIS ONE EVALUATION: CPT

## 2024-04-19 PROCEDURE — 6360000002 HC RX W HCPCS: Performed by: STUDENT IN AN ORGANIZED HEALTH CARE EDUCATION/TRAINING PROGRAM

## 2024-04-19 PROCEDURE — 2060000000 HC ICU INTERMEDIATE R&B

## 2024-04-19 PROCEDURE — 6370000000 HC RX 637 (ALT 250 FOR IP): Performed by: EMERGENCY MEDICINE

## 2024-04-19 PROCEDURE — 6370000000 HC RX 637 (ALT 250 FOR IP): Performed by: NURSE PRACTITIONER

## 2024-04-19 PROCEDURE — 2500000003 HC RX 250 WO HCPCS: Performed by: INTERNAL MEDICINE

## 2024-04-19 PROCEDURE — 83735 ASSAY OF MAGNESIUM: CPT

## 2024-04-19 PROCEDURE — 87449 NOS EACH ORGANISM AG IA: CPT

## 2024-04-19 PROCEDURE — 36415 COLL VENOUS BLD VENIPUNCTURE: CPT

## 2024-04-19 PROCEDURE — 81001 URINALYSIS AUTO W/SCOPE: CPT

## 2024-04-19 PROCEDURE — 93010 ELECTROCARDIOGRAM REPORT: CPT | Performed by: INTERNAL MEDICINE

## 2024-04-19 PROCEDURE — 2580000003 HC RX 258: Performed by: INTERNAL MEDICINE

## 2024-04-19 PROCEDURE — 6370000000 HC RX 637 (ALT 250 FOR IP): Performed by: STUDENT IN AN ORGANIZED HEALTH CARE EDUCATION/TRAINING PROGRAM

## 2024-04-19 PROCEDURE — 2580000003 HC RX 258: Performed by: STUDENT IN AN ORGANIZED HEALTH CARE EDUCATION/TRAINING PROGRAM

## 2024-04-19 RX ORDER — HEPARIN SODIUM 5000 [USP'U]/ML
5000 INJECTION, SOLUTION INTRAVENOUS; SUBCUTANEOUS EVERY 8 HOURS SCHEDULED
Status: DISCONTINUED | OUTPATIENT
Start: 2024-04-20 | End: 2024-04-21 | Stop reason: HOSPADM

## 2024-04-19 RX ORDER — LACTOBACILLUS RHAMNOSUS GG 10B CELL
1 CAPSULE ORAL 2 TIMES DAILY WITH MEALS
Status: DISCONTINUED | OUTPATIENT
Start: 2024-04-19 | End: 2024-04-21 | Stop reason: HOSPADM

## 2024-04-19 RX ORDER — CALCITRIOL 0.25 UG/1
0.25 CAPSULE, LIQUID FILLED ORAL DAILY
Status: DISCONTINUED | OUTPATIENT
Start: 2024-04-19 | End: 2024-04-21 | Stop reason: HOSPADM

## 2024-04-19 RX ORDER — CARVEDILOL 25 MG/1
25 TABLET ORAL 2 TIMES DAILY WITH MEALS
Status: DISCONTINUED | OUTPATIENT
Start: 2024-04-19 | End: 2024-04-21 | Stop reason: HOSPADM

## 2024-04-19 RX ADMIN — RANOLAZINE 500 MG: 500 TABLET, EXTENDED RELEASE ORAL at 20:45

## 2024-04-19 RX ADMIN — PIPERACILLIN AND TAZOBACTAM 4500 MG: 4; .5 INJECTION, POWDER, FOR SOLUTION INTRAVENOUS at 16:12

## 2024-04-19 RX ADMIN — PIPERACILLIN AND TAZOBACTAM 3375 MG: 3; .375 INJECTION, POWDER, LYOPHILIZED, FOR SOLUTION INTRAVENOUS at 20:59

## 2024-04-19 RX ADMIN — BENZONATATE 100 MG: 100 CAPSULE ORAL at 20:45

## 2024-04-19 RX ADMIN — SERTRALINE HYDROCHLORIDE 50 MG: 50 TABLET ORAL at 08:11

## 2024-04-19 RX ADMIN — LEVETIRACETAM 500 MG: 500 TABLET, FILM COATED ORAL at 08:11

## 2024-04-19 RX ADMIN — SODIUM CHLORIDE 25 ML: 9 INJECTION, SOLUTION INTRAVENOUS at 04:55

## 2024-04-19 RX ADMIN — SODIUM BICARBONATE 1300 MG: 650 TABLET ORAL at 20:45

## 2024-04-19 RX ADMIN — INSULIN LISPRO 3 UNITS: 100 INJECTION, SOLUTION INTRAVENOUS; SUBCUTANEOUS at 17:18

## 2024-04-19 RX ADMIN — INSULIN LISPRO 1 UNITS: 100 INJECTION, SOLUTION INTRAVENOUS; SUBCUTANEOUS at 01:15

## 2024-04-19 RX ADMIN — INSULIN LISPRO 2 UNITS: 100 INJECTION, SOLUTION INTRAVENOUS; SUBCUTANEOUS at 20:46

## 2024-04-19 RX ADMIN — INSULIN GLARGINE 18 UNITS: 100 INJECTION, SOLUTION SUBCUTANEOUS at 20:46

## 2024-04-19 RX ADMIN — INSULIN LISPRO 2 UNITS: 100 INJECTION, SOLUTION INTRAVENOUS; SUBCUTANEOUS at 04:33

## 2024-04-19 RX ADMIN — SODIUM CHLORIDE, PRESERVATIVE FREE 10 ML: 5 INJECTION INTRAVENOUS at 04:54

## 2024-04-19 RX ADMIN — LEVETIRACETAM 500 MG: 500 TABLET, FILM COATED ORAL at 20:45

## 2024-04-19 RX ADMIN — ENOXAPARIN SODIUM 40 MG: 100 INJECTION SUBCUTANEOUS at 08:11

## 2024-04-19 RX ADMIN — CALCITRIOL CAPSULES 0.25 MCG 0.25 MCG: 0.25 CAPSULE ORAL at 12:49

## 2024-04-19 RX ADMIN — CARVEDILOL 25 MG: 25 TABLET, FILM COATED ORAL at 17:17

## 2024-04-19 RX ADMIN — GUAIFENESIN 600 MG: 600 TABLET, EXTENDED RELEASE ORAL at 08:11

## 2024-04-19 RX ADMIN — BENZONATATE 100 MG: 100 CAPSULE ORAL at 17:17

## 2024-04-19 RX ADMIN — CLOPIDOGREL BISULFATE 75 MG: 75 TABLET ORAL at 08:11

## 2024-04-19 RX ADMIN — BENZONATATE 100 MG: 100 CAPSULE ORAL at 08:11

## 2024-04-19 RX ADMIN — MELATONIN TAB 3 MG 3 MG: 3 TAB at 20:45

## 2024-04-19 RX ADMIN — GUAIFENESIN 600 MG: 600 TABLET, EXTENDED RELEASE ORAL at 20:45

## 2024-04-19 RX ADMIN — SODIUM CHLORIDE, PRESERVATIVE FREE 10 ML: 5 INJECTION INTRAVENOUS at 01:16

## 2024-04-19 RX ADMIN — Medication 1 CAPSULE: at 12:49

## 2024-04-19 RX ADMIN — CEFTRIAXONE SODIUM 1000 MG: 1 INJECTION, POWDER, FOR SOLUTION INTRAMUSCULAR; INTRAVENOUS at 05:15

## 2024-04-19 RX ADMIN — SODIUM BICARBONATE 1300 MG: 650 TABLET ORAL at 12:49

## 2024-04-19 RX ADMIN — DOXYCYCLINE 100 MG: 100 INJECTION, POWDER, LYOPHILIZED, FOR SOLUTION INTRAVENOUS at 08:18

## 2024-04-19 RX ADMIN — BENZONATATE 100 MG: 100 CAPSULE ORAL at 12:49

## 2024-04-19 RX ADMIN — ASPIRIN 81 MG: 81 TABLET, COATED ORAL at 08:11

## 2024-04-19 RX ADMIN — SODIUM CHLORIDE, PRESERVATIVE FREE 10 ML: 5 INJECTION INTRAVENOUS at 08:10

## 2024-04-19 RX ADMIN — RANOLAZINE 500 MG: 500 TABLET, EXTENDED RELEASE ORAL at 08:11

## 2024-04-19 RX ADMIN — Medication 1 CAPSULE: at 17:17

## 2024-04-19 RX ADMIN — SODIUM BICARBONATE 1300 MG: 650 TABLET ORAL at 08:11

## 2024-04-19 ASSESSMENT — PAIN SCALES - GENERAL
PAINLEVEL_OUTOF10: 0

## 2024-04-19 NOTE — PROGRESS NOTES
Nephrology Progress Note  [facility]  [phone]   [employer]    Patient:  Te Garay   : 1965    CC: ESRD on PD    Subjective:  Patient seen and examined.  If he talks too much then gets a bout of cough.  Discussed with ED nurse, net UF 0 overnight.  Was on CCPD.  Per wife he has issues with sensor of the insulin pump.  He looks much better than yesterday. BSL much better.     ROS:   Cough is much better.    SHx:  Wife at bedside.     Meds:  Scheduled Meds:   [START ON 2024] heparin (porcine)  5,000 Units SubCUTAneous 3 times per day    lactobacillus  1 capsule Oral BID WC    sodium chloride flush  10 mL IntraVENous 2 times per day    melatonin  3 mg Oral Nightly    insulin glargine  18 Units SubCUTAneous Nightly    insulin lispro  0-4 Units SubCUTAneous Q4H    aspirin  81 mg Oral Daily    clopidogrel  75 mg Oral Daily    levETIRAcetam  500 mg Oral BID    ranolazine  500 mg Oral BID    sertraline  50 mg Oral Daily    doxycycline (VIBRAMYCIN) IV  100 mg IntraVENous Q12H    cefTRIAXone (ROCEPHIN) IV  1,000 mg IntraVENous Q24H    benzonatate  100 mg Oral TID    gentamicin   Topical Daily    sodium bicarbonate  1,300 mg Oral TID    guaiFENesin  600 mg Oral BID     Continuous Infusions:   sodium chloride Stopped (24 0635)    dianeal lo-miller 1.5%       PRN Meds:.sodium chloride flush, sodium chloride, promethazine **OR** ondansetron, acetaminophen **OR** acetaminophen, glucagon (rDNA), benzonatate, dextrose bolus **OR** dextrose bolus, magnesium sulfate, sodium phosphate 15 mmol in sodium chloride 0.9 % 250 mL IVPB    Vitals:  BP (!) 179/80   Pulse 84   Temp 99.2 °F (37.3 °C) (Oral)   Resp 17   Ht 1.727 m (5' 8\")   Wt 91.1 kg (200 lb 14.4 oz)   SpO2 97%   BMI 30.55 kg/m²     Physical Exam:  Gen: Resting in bed, NAD.    HEENT: MMM, OP clear.  CV: RRR, no S3.  Lungs: good respiratory effort and clear air entry   Abd: S/NT +BS  Ext: No edema, no cyanosis  Skin: Warm.

## 2024-04-19 NOTE — PROGRESS NOTES
Podiatric Surgery Daily Progress Note  Te Garay      Subjective :   Patient seen and examined this am at the bedside. Patient denies any acute overnight events. Patient denies N/V/F/C/SOB. Patient denies calf pain, thigh pain, chest pain.     Review of Systems: A 12 point review of symptoms is unremarkable with the exception of the chief complaint. Patient specifically denies nausea, fever, vomiting, chills, shortness of breath, chest pain, abdominal pain, constipation or difficulty urinating.       Objective     BP (!) 179/80   Pulse 84   Temp 99.2 °F (37.3 °C) (Oral)   Resp 17   Ht 1.727 m (5' 8\")   Wt 91.1 kg (200 lb 14.4 oz)   SpO2 97%   BMI 30.55 kg/m²      I/O:  Intake/Output Summary (Last 24 hours) at 4/19/2024 1238  Last data filed at 4/19/2024 1136  Gross per 24 hour   Intake 1880.24 ml   Output 400 ml   Net 1480.24 ml              Wt Readings from Last 3 Encounters:   04/19/24 91.1 kg (200 lb 14.4 oz)   04/17/24 89.3 kg (196 lb 14.4 oz)   03/08/24 88.9 kg (196 lb)       LABS:    Recent Labs     04/18/24  0503 04/19/24  0502   WBC 6.9 7.0   HGB 11.2* 12.0*   HCT 33.5* 36.4*   * 115*        Recent Labs     04/18/24  0503 04/18/24  1222 04/19/24  0502   *   < > 133*   K 3.9   < > 3.5   CL 98*   < > 96*   CO2 20*   < > 20*   PHOS 3.0  --   --    BUN 59*   < > 54*   CREATININE 8.0*   < > 8.4*    < > = values in this interval not displayed.        Recent Labs     04/17/24 2022 04/19/24  0502   PROT 6.8 6.1*           LOWER EXTREMITY EXAMINATION    Dressing to left LE intact. No strikethrough noted to the external dressing.  Moderate drainage noted to the internal layers of the dressing.     VASCULAR: DP and PT pulses are palpable weakly palpable 1/4. CFT is brisk to the digits of the foot b/l. Skin temperature is warm to cool from proximal to distal with no focal calor noted. No edema noted. No pain with calf compression b/l.    NEUROLOGIC: Gross and epicritic sensation is diminished  b/l. Protective sensation is intact at all pedal sites b/l.    DERMATOLOGIC:   Patient provided verbal consent for photos be taken today:  Left lower extremity:    Full-thickness ulceration noted to the lateral aspect of the patient's fifth metatarsal base.  Wound bed is granular in nature with a slightly hyperkeratotic periwound.  No evidence of purulence, fluctuance, or crepitations.  Wound does not probe to bone, tunnel, or track.    Right lower extremity:  Closed soft tissue envelope is appreciated without acute signs of infection.    MUSCULOSKELETAL: Muscle strength is 5/5 for all pedal groups tested.  Pain with manual manipulation of the left foot and with direct palpation to the periwound area. Ankle joint ROM is decreased in dorsiflexion with the knee extended. No obvious biomechanical abnormalities.       XR Left foot (4/18/24)  FINDINGS:  Prior history of osteomyelitis.  On the current exam, there are no new  erosive changes.  There is soft tissue swelling and suspected mild ulceration  superficial to the base of the 5th metatarsal at site of prior treatment.  Stable irregularity at the cortex of the lateral base of the 5th metatarsal.  Mild degenerative change otherwise noted in the foot.  Vascular  calcifications at the ankle.     IMPRESSION:  Questionable pattern of ulceration on the lateral aspect of the left foot.  Correlate with physical exam for any symptoms of cellulitis.  There is no  evidence of acute osteomyelitis.     B/L LE Arterial duplex (10/18/23):  Right Impression  Right CATHI: 1.14 . This is consistent with no significant PAD at rest.  Left Impression  Left CATHI: 1.09. This is consistent with no significant PAD at rest.  Not able to perform left DP due to heavy bandage /wound.  Ultrasound images of the left lower extremity reveal mild calcific plaque  formation throughout.  The majority of the waveforms are multiphasic throughout the left lower  extremity.  Multiphasic flow is seen in the

## 2024-04-19 NOTE — FLOWSHEET NOTE
Messaged hospitalist at [facility], HALLE Banda 8:10 PM via [employer], \"Pt having coughing epsiodes and states that he feels like \"something's still stuck in there.\" Pt able to expectorate some sputum, but wife requesting mucinex/breathing treatments if that would be beneficial?'    Message marked as, \"Read 8:16 PM \"       NP responded at 8:25 PM,\"call RT to eval for breathing tx vs MDI - order was placed \"

## 2024-04-19 NOTE — PROGRESS NOTES
Treatment time: 9 Hours 29 minutes  Net UF: 48 ml  Dwell Time: 56 minutes Gained  Treatment completed without complications or complaints from patient. Effluent clear yellow and lines taped to patient per protocol. Patient resting comfortably with VSS upon exiting room.      Copy of dialysis treatment record placed in chart, to be scanned into EMR

## 2024-04-19 NOTE — PLAN OF CARE
Problem: Safety - Adult  Goal: Free from fall injury  Outcome: Progressing     Problem: Discharge Planning  Goal: Discharge to home or other facility with appropriate resources  Outcome: Progressing  Flowsheets (Taken 4/19/2024 0805)  Discharge to home or other facility with appropriate resources: Identify barriers to discharge with patient and caregiver     Problem: Pain  Goal: Verbalizes/displays adequate comfort level or baseline comfort level  4/19/2024 0831 by Brigid Villalobos RN  Outcome: Progressing  4/19/2024 0706 by Carol Mann RN  Outcome: Progressing     Problem: Hematologic - Adult  Goal: Maintains hematologic stability  4/19/2024 0831 by Brigid Villalobos RN  Outcome: Progressing  Flowsheets (Taken 4/19/2024 0805)  Maintains hematologic stability: Assess for signs and symptoms of bleeding or hemorrhage  4/19/2024 0706 by Carol Mann RN  Outcome: Progressing     Problem: ABCDS Injury Assessment  Goal: Absence of physical injury  Outcome: Progressing     Problem: Skin/Tissue Integrity - Adult  Goal: Incisions, wounds, or drain sites healing without S/S of infection  Outcome: Progressing     Problem: Metabolic/Fluid and Electrolytes - Adult  Goal: Electrolytes maintained within normal limits  Outcome: Progressing  Flowsheets (Taken 4/19/2024 0805)  Electrolytes maintained within normal limits: Monitor labs and assess patient for signs and symptoms of electrolyte imbalances

## 2024-04-19 NOTE — PROGRESS NOTES
Physician Progress Note      PATIENT:               DIDIER RUVALCABA  CSN #:                  [id number]  :                       1965  ADMIT DATE:       2024 7:36 PM  DISCH DATE:  RESPONDING  PROVIDER #:        IQRA EUBANKS MD          QUERY TEXT:    Pt admitted with pneumonia/DKA. Pt noted to have Temp evolve from 98.2 to   100.6, HR 89-92, and PCT 4.50 on arrival. If possible, please document in the   progress notes and discharge summary if you are evaluating and /or treating   any of the following:    The medical record reflects the following:  Risk Factors: ESRD, PNA , DM1, CHF, recent hospital admission    Clinical Indicators: on arrival to ED - PCT 4.50 from recent prior   admission of 0.32, HR 89-92, Temp 98.2 evolving to 100.6 after 6 hours of   admission  CT-Right middle and bilateral lower lobe ground-glass opacities consistent   with multifocal pneumonia.  BC- no results yet    Treatment: CT, labs, IV Rocephin, IV Doxycycline, bicarb, insulin, supportive   care    Thank you,  Apurva Lassiter RN BSN CRCR CCDS  [email or web address]  Options provided:  -- Sepsis due to pneumonia present on arrival  -- Pneumonia without Sepsis  -- Other - I will add my own diagnosis  -- Disagree - Not applicable / Not valid  -- Disagree - Clinically unable to determine / Unknown  -- Refer to Clinical Documentation Reviewer    PROVIDER RESPONSE TEXT:    This patient has pneumonia without Sepsis.    Query created by: Nidia Lassiter on 2024 2:25 PM      Electronically signed by:  IQRA EUBANKS MD 2024 4:54 PM

## 2024-04-19 NOTE — PROGRESS NOTES
Shift assessment completed. Routine vitals stable. Scheduled medications given. Patient is sleepy but oriented.  Respirations are easy and unlabored. Patient does not appear to be in distress, resting comfortably at this time. Call light within reach. Noted occasional barky cough while patient is sleeping. PIV to left forearm hard to flush and IV ATB not infusing. PICC nurse called to insert new PIV. Patient has no c/o pain or discomfort with the IV.

## 2024-04-19 NOTE — FLOWSHEET NOTE
04/19/24 0600   Oxygen Therapy   SpO2 95 %  (pt snores at times while sleeping;no apneic periods noted overnight. sat check while pt sleeping & actively snoring)   Pulse Oximetry Type Intermittent   Pulse Oximeter Device Location Finger   O2 Device None (Room air)

## 2024-04-19 NOTE — PLAN OF CARE
Problem: Pain  Goal: Verbalizes/displays adequate comfort level or baseline comfort level  Outcome: Progressing     Problem: Hematologic - Adult  Goal: Maintains hematologic stability  Outcome: Progressing     Change of shift now;gave report to oncoming shift.      04/19/24 0715   Vitals   Temp 98.9 °F (37.2 °C)   Temp Source Oral   Heart Rate Source Monitor   Respirations 17   BP (!) 147/68   MAP (Calculated) 94   BP Location Right upper arm   BP Upper/Lower Upper   BP Method Automatic   Patient Position Supine;Semi fowlers   Cardiac Rhythm Sinus rhythm with PVC   Pain Assessment   Pain Assessment 0-10   Pain Level 0   Patient's Stated Pain Goal 0 - No pain   Opioid-Induced Sedation   POSS Score 1   RASS   Hayes Agitation Sedation Scale (RASS) 0   Oxygen Therapy   SpO2 95 %   Pulse Oximetry Type Intermittent   O2 Device None (Room air)

## 2024-04-19 NOTE — PLAN OF CARE
Problem: Pain  Goal: Verbalizes/displays adequate comfort level or baseline comfort level  Outcome: Progressing     Problem: Hematologic - Adult  Goal: Maintains hematologic stability  Outcome: Progressing      04/18/24 2316   Vitals   Temp 99 °F (37.2 °C)   Temp Source Oral   Pulse 76   Heart Rate Source Brachial;Radial   Respirations 20   BP (!) 146/85   MAP (Calculated) 105   BP Location Right upper arm   BP Upper/Lower Upper   BP Method Automatic   Patient Position Semi fowlers   Cardiac Rhythm Sinus rhythm with PVC   Pain Assessment   Pain Assessment 0-10   Pain Level 0   Oxygen Therapy   SpO2 99 %   Pulse Oximetry Type Intermittent   Pulse Oximeter Device Location Finger   O2 Device None (Room air)

## 2024-04-19 NOTE — PROGRESS NOTES
Admit Date: 4/17/2024  Diet: ADULT DIET; Regular; 3 carb choices (45 gm/meal); Low Fat/Low Chol/High Fiber/2 gm Na; Low Sodium (2 gm)    CC: Multifocal pneumonia    Interval history:   Overnight, there were no acute events.  Patient's vitals remained stable.    Patient was seen this morning in bed sitting up in bed.  He endorsed that he feels much better.  He is tolerating diet well.  He claims when he coughs too much she cannot swallow but overall he is doing much better.  He did not have any further febrile episodes overnight.  He is coughing up a lot of mucus.  Patient denies fevers, chills,  chest pain, shortness of breath, diarrhea, constipation, dysuria, urinary frequency or urgency.     Plan:     -Continue IV antibiotics with Rocephin + Doxycycline  -Probiotics  -Mucinex twice daily  -Out of bed  -PD while inpatient per nephrology    Assessment:   Te Garay is a 59 y.o. male with PMH of poorly controlled DM1 uses insulin pump, has full sequela including nephropathy ESRD on PD, polyneuropathy with history of LLE debridement, CAD status post CABG, HFrEF, seizure disorder, mood disorder  who was admitted with multifocal pneumonia    Multifocal PNA  Patient endorses that when he went home his cough worsened he had worsening productive sputum.  On admission, he was febrile with a Tmax of 100.6.  CT chest showed right middle and bilateral lower lobe ground-glass opacities consistent   with multifocal pneumonia.  He was started on IV antibiotics.  Pro-Edin elevated to 4.50 from 0.32    ESRD on PD  Patient follows with nephrology in the outpatient setting. He is on PD at home.  -Nephrology consulted, appreciate recs     CAD  Patient is s/p CABG. Last echo with EF 35%. At home, patient is on Coreg, Entresto, oral Lasix, Imdur, Ranexa, aspirin,      HLD  At home, patient is on Carvedilol     Seizure disorder and mood disorder  At home, patient is on Keppra and Zoloft    Type 1 diabetes-uncontrolled  At home,

## 2024-04-19 NOTE — FLOWSHEET NOTE
04/18/24 1924   Vital Signs   BP (!) 167/63   Temp 98.7 °F (37.1 °C)   Pulse 75   Respirations 20       Standing weight 90.7 kg.  States EDW 87-88 kg.    Connected to Cycler per protocol.    CCPD order:                        1.5% Dianeal, % verified with Dr. Bradley,                        Total Volume: 7.5 L                       Therapy Time Duration (Hours): 09:00                        Exchange Volume: 2.5 L                        Number of Exchanges: 3                        No Final Fill    Dwell Time: 02:14  Fill Time: 00:20  Drain Time: 00:26    Initial drain volume: 49 ml    Effluent clear norm without fibrin.    Exit site care done per protocol.  Site crusty.  Nontender.   [place] drsg to site.    PD fluid cx/cytology obtained and sent to lab.      Report given to

## 2024-04-19 NOTE — CARE COORDINATION
Discharge Planning Note:    Chart reviewed and pt has possible IV medication needs upon discharge.  SW/SMOOTH contacted Selena with [employer] requesting home infusion benefits check.  Selena to report back with benefit allowances.        Electronically signed by ROBIN Patrick on 4/19/2024 at 4:10 PM

## 2024-04-19 NOTE — PROGRESS NOTES
Pt evaluated by this RRT, pt appears to have coarse and fine crackles, debra kin bases, posterior. Harsh, strong NPC. Recommending 1-2lpm nasal cannula, something for cough.

## 2024-04-19 NOTE — PLAN OF CARE
Problem: Pain  Goal: Verbalizes/displays adequate comfort level or baseline comfort level  Outcome: Progressing     Problem: Hematologic - Adult  Goal: Maintains hematologic stability  Outcome: Progressing      04/19/24 0330   Vitals   Temp 98.9 °F (37.2 °C)   Temp Source Oral   Pulse 71   Heart Rate Source Brachial;Radial   Respirations 20   BP (!) 175/92   MAP (Calculated) 120   BP Location Right upper arm   BP Upper/Lower Upper   BP Method Automatic   Patient Position Semi fowlers   Cardiac Rhythm Sinus rhythm with PVC   Pain Assessment   Pain Assessment 0-10   Pain Level 0   Oxygen Therapy   SpO2 97 %   Pulse Oximetry Type Intermittent   Pulse Oximeter Device Location Finger   O2 Device None (Room air)

## 2024-04-19 NOTE — FLOWSHEET NOTE
Pt calm and cooperative with frequent cough noted prior to PD started. After PD started, pt reported to RN that he sometimes at home will have difficulty getting comfortable/positioned and feels like the fluid in his abdomen pushes on his lungs/organs when PD in process/started.     Pt anxious, and wife at bedside states that pt does get panic attacks when in the hospital. Slow, pursed lip breathing encouraged with pt.       2044 Wife asked if  is here at the hospital to come see pt. Wife declined RN to call spiritual services/on call and then called / for prayer. Pt more calm after prayer

## 2024-04-20 LAB
ALBUMIN SERPL-MCNC: 2.9 G/DL (ref 3.4–5)
ALBUMIN/GLOB SERPL: 0.9 {RATIO} (ref 1.1–2.2)
ALP SERPL-CCNC: 60 U/L (ref 40–129)
ALT SERPL-CCNC: 9 U/L (ref 10–40)
ANION GAP SERPL CALCULATED.3IONS-SCNC: 16 MMOL/L (ref 3–16)
AST SERPL-CCNC: 21 U/L (ref 15–37)
BACTERIA BLD CULT ORG #2: NORMAL
BACTERIA BLD CULT: NORMAL
BACTERIA SPEC RESP CULT: ABNORMAL
BASOPHILS # BLD: 0 K/UL (ref 0–0.2)
BASOPHILS NFR BLD: 0.4 %
BILIRUB SERPL-MCNC: 0.3 MG/DL (ref 0–1)
BUN SERPL-MCNC: 49 MG/DL (ref 7–20)
CALCIUM SERPL-MCNC: 8.2 MG/DL (ref 8.3–10.6)
CHLORIDE SERPL-SCNC: 98 MMOL/L (ref 99–110)
CO2 SERPL-SCNC: 22 MMOL/L (ref 21–32)
CREAT SERPL-MCNC: 8.2 MG/DL (ref 0.9–1.3)
DEPRECATED RDW RBC AUTO: 16.4 % (ref 12.4–15.4)
EOSINOPHIL # BLD: 0.1 K/UL (ref 0–0.6)
EOSINOPHIL NFR BLD: 2.5 %
GFR SERPLBLD CREATININE-BSD FMLA CKD-EPI: 7 ML/MIN/{1.73_M2}
GLUCOSE BLD-MCNC: 109 MG/DL (ref 70–99)
GLUCOSE BLD-MCNC: 240 MG/DL (ref 70–99)
GLUCOSE BLD-MCNC: 274 MG/DL (ref 70–99)
GLUCOSE BLD-MCNC: 81 MG/DL (ref 70–99)
GLUCOSE BLD-MCNC: 87 MG/DL (ref 70–99)
GLUCOSE SERPL-MCNC: 97 MG/DL (ref 70–99)
GRAM STN SPEC: ABNORMAL
HCT VFR BLD AUTO: 35 % (ref 40.5–52.5)
HGB BLD-MCNC: 11.8 G/DL (ref 13.5–17.5)
LEGIONELLA AG UR QL: NORMAL
LYMPHOCYTES # BLD: 0.9 K/UL (ref 1–5.1)
LYMPHOCYTES NFR BLD: 16.1 %
MAGNESIUM SERPL-MCNC: 2.2 MG/DL (ref 1.8–2.4)
MCH RBC QN AUTO: 29.1 PG (ref 26–34)
MCHC RBC AUTO-ENTMCNC: 33.7 G/DL (ref 31–36)
MCV RBC AUTO: 86.4 FL (ref 80–100)
MONOCYTES # BLD: 0.6 K/UL (ref 0–1.3)
MONOCYTES NFR BLD: 11.5 %
NEUTROPHILS # BLD: 3.9 K/UL (ref 1.7–7.7)
NEUTROPHILS NFR BLD: 69.5 %
ORGANISM: ABNORMAL
ORGANISM: ABNORMAL
PERFORMED ON: ABNORMAL
PERFORMED ON: NORMAL
PERFORMED ON: NORMAL
PLATELET # BLD AUTO: 122 K/UL (ref 135–450)
PMV BLD AUTO: 9.2 FL (ref 5–10.5)
POTASSIUM SERPL-SCNC: 3.4 MMOL/L (ref 3.5–5.1)
PROCALCITONIN SERPL IA-MCNC: 3.72 NG/ML (ref 0–0.15)
PROT SERPL-MCNC: 6.2 G/DL (ref 6.4–8.2)
RBC # BLD AUTO: 4.06 M/UL (ref 4.2–5.9)
S PNEUM AG UR QL: NORMAL
SODIUM SERPL-SCNC: 136 MMOL/L (ref 136–145)
WBC # BLD AUTO: 5.5 K/UL (ref 4–11)

## 2024-04-20 PROCEDURE — 83735 ASSAY OF MAGNESIUM: CPT

## 2024-04-20 PROCEDURE — 6370000000 HC RX 637 (ALT 250 FOR IP): Performed by: STUDENT IN AN ORGANIZED HEALTH CARE EDUCATION/TRAINING PROGRAM

## 2024-04-20 PROCEDURE — 90945 DIALYSIS ONE EVALUATION: CPT

## 2024-04-20 PROCEDURE — 80053 COMPREHEN METABOLIC PANEL: CPT

## 2024-04-20 PROCEDURE — 2580000003 HC RX 258: Performed by: STUDENT IN AN ORGANIZED HEALTH CARE EDUCATION/TRAINING PROGRAM

## 2024-04-20 PROCEDURE — 6370000000 HC RX 637 (ALT 250 FOR IP): Performed by: INTERNAL MEDICINE

## 2024-04-20 PROCEDURE — 2060000000 HC ICU INTERMEDIATE R&B

## 2024-04-20 PROCEDURE — 36415 COLL VENOUS BLD VENIPUNCTURE: CPT

## 2024-04-20 PROCEDURE — 6370000000 HC RX 637 (ALT 250 FOR IP): Performed by: NURSE PRACTITIONER

## 2024-04-20 PROCEDURE — 6360000002 HC RX W HCPCS: Performed by: STUDENT IN AN ORGANIZED HEALTH CARE EDUCATION/TRAINING PROGRAM

## 2024-04-20 PROCEDURE — 85025 COMPLETE CBC W/AUTO DIFF WBC: CPT

## 2024-04-20 PROCEDURE — 84145 PROCALCITONIN (PCT): CPT

## 2024-04-20 PROCEDURE — 2580000003 HC RX 258: Performed by: INTERNAL MEDICINE

## 2024-04-20 PROCEDURE — 93005 ELECTROCARDIOGRAM TRACING: CPT | Performed by: STUDENT IN AN ORGANIZED HEALTH CARE EDUCATION/TRAINING PROGRAM

## 2024-04-20 RX ORDER — INSULIN LISPRO 100 [IU]/ML
0-8 INJECTION, SOLUTION INTRAVENOUS; SUBCUTANEOUS
Status: DISCONTINUED | OUTPATIENT
Start: 2024-04-20 | End: 2024-04-21 | Stop reason: HOSPADM

## 2024-04-20 RX ORDER — ISOSORBIDE MONONITRATE 30 MG/1
30 TABLET, EXTENDED RELEASE ORAL DAILY
Status: DISCONTINUED | OUTPATIENT
Start: 2024-04-20 | End: 2024-04-21 | Stop reason: HOSPADM

## 2024-04-20 RX ORDER — SODIUM CHLORIDE, SODIUM LACTATE, CALCIUM CHLORIDE, MAGNESIUM CHLORIDE AND DEXTROSE 2.5; 538; 448; 18.3; 5.08 G/100ML; MG/100ML; MG/100ML; MG/100ML; MG/100ML
2500 INJECTION, SOLUTION INTRAPERITONEAL CONTINUOUS
Status: DISCONTINUED | OUTPATIENT
Start: 2024-04-20 | End: 2024-04-21 | Stop reason: HOSPADM

## 2024-04-20 RX ORDER — INSULIN LISPRO 100 [IU]/ML
0-4 INJECTION, SOLUTION INTRAVENOUS; SUBCUTANEOUS NIGHTLY
Status: DISCONTINUED | OUTPATIENT
Start: 2024-04-20 | End: 2024-04-21 | Stop reason: HOSPADM

## 2024-04-20 RX ORDER — LABETALOL HYDROCHLORIDE 5 MG/ML
10 INJECTION, SOLUTION INTRAVENOUS EVERY 4 HOURS PRN
Status: DISCONTINUED | OUTPATIENT
Start: 2024-04-20 | End: 2024-04-21 | Stop reason: HOSPADM

## 2024-04-20 RX ORDER — FUROSEMIDE 10 MG/ML
40 INJECTION INTRAMUSCULAR; INTRAVENOUS ONCE
Status: COMPLETED | OUTPATIENT
Start: 2024-04-20 | End: 2024-04-20

## 2024-04-20 RX ORDER — NITROGLYCERIN 0.4 MG/1
0.4 TABLET SUBLINGUAL EVERY 5 MIN PRN
Status: DISCONTINUED | OUTPATIENT
Start: 2024-04-20 | End: 2024-04-21 | Stop reason: HOSPADM

## 2024-04-20 RX ORDER — FUROSEMIDE 40 MG/1
40 TABLET ORAL 2 TIMES DAILY
Status: DISCONTINUED | OUTPATIENT
Start: 2024-04-21 | End: 2024-04-21 | Stop reason: HOSPADM

## 2024-04-20 RX ADMIN — SODIUM CHLORIDE, PRESERVATIVE FREE 10 ML: 5 INJECTION INTRAVENOUS at 08:19

## 2024-04-20 RX ADMIN — GUAIFENESIN 600 MG: 600 TABLET, EXTENDED RELEASE ORAL at 08:19

## 2024-04-20 RX ADMIN — RANOLAZINE 500 MG: 500 TABLET, EXTENDED RELEASE ORAL at 21:32

## 2024-04-20 RX ADMIN — PIPERACILLIN AND TAZOBACTAM 3375 MG: 3; .375 INJECTION, POWDER, LYOPHILIZED, FOR SOLUTION INTRAVENOUS at 04:51

## 2024-04-20 RX ADMIN — GUAIFENESIN 600 MG: 600 TABLET, EXTENDED RELEASE ORAL at 21:32

## 2024-04-20 RX ADMIN — ISOSORBIDE MONONITRATE 30 MG: 30 TABLET, EXTENDED RELEASE ORAL at 16:28

## 2024-04-20 RX ADMIN — RANOLAZINE 500 MG: 500 TABLET, EXTENDED RELEASE ORAL at 08:19

## 2024-04-20 RX ADMIN — CARVEDILOL 25 MG: 25 TABLET, FILM COATED ORAL at 08:19

## 2024-04-20 RX ADMIN — HEPARIN SODIUM 5000 UNITS: 5000 INJECTION INTRAVENOUS; SUBCUTANEOUS at 21:33

## 2024-04-20 RX ADMIN — SODIUM CHLORIDE, PRESERVATIVE FREE 10 ML: 5 INJECTION INTRAVENOUS at 16:28

## 2024-04-20 RX ADMIN — SODIUM CHLORIDE, PRESERVATIVE FREE 10 ML: 5 INJECTION INTRAVENOUS at 14:29

## 2024-04-20 RX ADMIN — INSULIN LISPRO 4 UNITS: 100 INJECTION, SOLUTION INTRAVENOUS; SUBCUTANEOUS at 21:33

## 2024-04-20 RX ADMIN — ASPIRIN 81 MG: 81 TABLET, COATED ORAL at 08:19

## 2024-04-20 RX ADMIN — Medication 1 CAPSULE: at 15:58

## 2024-04-20 RX ADMIN — LEVETIRACETAM 500 MG: 500 TABLET, FILM COATED ORAL at 08:18

## 2024-04-20 RX ADMIN — BENZONATATE 100 MG: 100 CAPSULE ORAL at 14:29

## 2024-04-20 RX ADMIN — MELATONIN TAB 3 MG 3 MG: 3 TAB at 21:32

## 2024-04-20 RX ADMIN — BENZONATATE 100 MG: 100 CAPSULE ORAL at 08:19

## 2024-04-20 RX ADMIN — SERTRALINE HYDROCHLORIDE 50 MG: 50 TABLET ORAL at 08:19

## 2024-04-20 RX ADMIN — LEVETIRACETAM 500 MG: 500 TABLET, FILM COATED ORAL at 21:32

## 2024-04-20 RX ADMIN — SODIUM BICARBONATE 1300 MG: 650 TABLET ORAL at 21:31

## 2024-04-20 RX ADMIN — SODIUM BICARBONATE 1300 MG: 650 TABLET ORAL at 14:29

## 2024-04-20 RX ADMIN — SODIUM CHLORIDE, PRESERVATIVE FREE 10 ML: 5 INJECTION INTRAVENOUS at 21:38

## 2024-04-20 RX ADMIN — SODIUM BICARBONATE 1300 MG: 650 TABLET ORAL at 08:18

## 2024-04-20 RX ADMIN — CALCITRIOL CAPSULES 0.25 MCG 0.25 MCG: 0.25 CAPSULE ORAL at 08:19

## 2024-04-20 RX ADMIN — FUROSEMIDE 40 MG: 10 INJECTION, SOLUTION INTRAMUSCULAR; INTRAVENOUS at 16:28

## 2024-04-20 RX ADMIN — BENZONATATE 100 MG: 100 CAPSULE ORAL at 21:32

## 2024-04-20 RX ADMIN — SACUBITRIL AND VALSARTAN 1 TABLET: 49; 51 TABLET, FILM COATED ORAL at 21:32

## 2024-04-20 RX ADMIN — CARVEDILOL 25 MG: 25 TABLET, FILM COATED ORAL at 15:58

## 2024-04-20 RX ADMIN — POTASSIUM BICARBONATE 50 MEQ: 978 TABLET, EFFERVESCENT ORAL at 10:34

## 2024-04-20 RX ADMIN — HEPARIN SODIUM 5000 UNITS: 5000 INJECTION INTRAVENOUS; SUBCUTANEOUS at 14:29

## 2024-04-20 RX ADMIN — INSULIN LISPRO 1 UNITS: 100 INJECTION, SOLUTION INTRAVENOUS; SUBCUTANEOUS at 17:29

## 2024-04-20 RX ADMIN — CLOPIDOGREL BISULFATE 75 MG: 75 TABLET ORAL at 08:18

## 2024-04-20 RX ADMIN — PIPERACILLIN AND TAZOBACTAM 3375 MG: 3; .375 INJECTION, POWDER, LYOPHILIZED, FOR SOLUTION INTRAVENOUS at 14:33

## 2024-04-20 RX ADMIN — INSULIN GLARGINE 18 UNITS: 100 INJECTION, SOLUTION SUBCUTANEOUS at 21:32

## 2024-04-20 RX ADMIN — Medication 1 CAPSULE: at 08:18

## 2024-04-20 NOTE — PROGRESS NOTES
Visit us at [facility] or call us at [address] [place] [address]    NEPHROLOGY PROGRESS NOTE    Patient: Te Garay MRN: [id number]     YOB: 1965  Age: 59 y.o.  Sex: male    Unit: [address] Room/Bed: 3TN-3365/3365-01 Location: [facility]     Admitting Physician: MARIA INES NEVAREZ    Primary Care Physician: Reid Lei MD          LOS: 2 days       Reason for evaluation:   ESRD      SUBJECTIVE:      The patient was seen and examined. Notes and labs reviewed.  There were no complications over night.    Patient's review of systems: still with increased cough, feels SOB      OBJECTIVE:     Vitals:    04/20/24 1430 04/20/24 1555 04/20/24 1730 04/20/24 1845   BP:  (!) 169/66 (!) 148/72 (!) 147/67   Pulse: 79 78 80 84   Resp:  18  18   Temp:  97.9 °F (36.6 °C)  98.8 °F (37.1 °C)   TempSrc:  Oral  Oral   SpO2:  96%  98%   Weight:  92.5 kg (204 lb)  92.2 kg (203 lb 4.2 oz)   Height:           Intake and Output:      Intake/Output Summary (Last 24 hours) at 4/20/2024 1946  Last data filed at 4/20/2024 1430  Gross per 24 hour   Intake 886.47 ml   Output --   Net 886.47 ml       Continuous Infusions:   sodium chloride Stopped (04/19/24 0635)    dianeal lo-miller 1.5%         Current meds:   sacubitril-valsartan  1 tablet Oral BID    isosorbide mononitrate  30 mg Oral Daily    [START ON 4/21/2024] furosemide  40 mg Oral BID    heparin (porcine)  5,000 Units SubCUTAneous 3 times per day    lactobacillus  1 capsule Oral BID WC    carvedilol  25 mg Oral BID WC    calcitRIOL  0.25 mcg Oral Daily    piperacillin-tazobactam  3,375 mg IntraVENous Q8H    sodium chloride flush  10 mL IntraVENous 2 times per day    melatonin  3 mg Oral Nightly    insulin glargine  18 Units SubCUTAneous Nightly    insulin lispro  0-4 Units SubCUTAneous Q4H    aspirin  81 mg Oral Daily    clopidogrel  75 mg Oral Daily    levETIRAcetam  500 mg Oral BID    ranolazine  500 mg Oral BID    sertraline  50 mg Oral Daily    benzonatate

## 2024-04-20 NOTE — PLAN OF CARE
Problem: Safety - Adult  Goal: Free from fall injury  Outcome: Progressing     Problem: Discharge Planning  Goal: Discharge to home or other facility with appropriate resources  Outcome: Progressing     Problem: Pain  Goal: Verbalizes/displays adequate comfort level or baseline comfort level  Outcome: Progressing  Flowsheets (Taken 4/19/2024 1915 by Brigid Villalobos, RN)  Verbalizes/displays adequate comfort level or baseline comfort level: Encourage patient to monitor pain and request assistance     Problem: Hematologic - Adult  Goal: Maintains hematologic stability  Outcome: Progressing     Problem: Skin/Tissue Integrity - Adult  Goal: Incisions, wounds, or drain sites healing without S/S of infection  Outcome: Progressing     Problem: Metabolic/Fluid and Electrolytes - Adult  Goal: Electrolytes maintained within normal limits  Outcome: Progressing     Problem: ABCDS Injury Assessment  Goal: Absence of physical injury  Outcome: Progressing

## 2024-04-20 NOTE — PROGRESS NOTES
Pt with some chest pain earlier today, he reports he has angina and takes nitro at home. EKG ordered and MD made aware. BP has remained elevated today and pt reports feeling overloaded. Unknown as to how much fluid removed from cycler as there is not a note from dialysis RN. Pt does report weight being up from admission weight. MR made aware and new orders placed.

## 2024-04-20 NOTE — PROGRESS NOTES
the waveforms are multiphasic throughout the left lower  extremity.  Multiphasic flow is seen in the posterior and peroneal arteries with  monophasic flow in the anterior tibial artery.  Hyperemic flow noted at all three runoff vessels.     ASSESSMENT/PLAN:   -Full thickness ulceration, left foot (Quiroz 2)  -Type I diabetes mellitus with peripheral neuropathy  -Charcot foot deformity, left foot    -Patient examined and evaluated at bedside   -Hypertensive otherwise VSS, no leukocytosis noted (WBC 5.5)  -HbA1c 9.1  -Wound culture not obtained at this time 2/2 no active signs of infection  -Blood culture NGTD  -Imaging reviewed, impression noted above.  -Dressing applied to left LE consisting of saline soaked gauze, dry gauze, Kerlix, and Ace  -Recommend patient continue on IV antibiotics per primary: Ceftriaxone and doxycycline.  -Patient to be partial weightbearing to the left lower extremity with care taken to bear majority of weight on the heel in surgical shoe.    DISPO: Full-thickness ulceration, left foot (Quiroz 2).  All labs and imaging reviewed, impressions noted above.  Recommend patient continue on IV antibiotics per primary: Doxycycline and ceftriaxone however, no antibiotics necessary from a podiatric standpoint 2/2 no active signs of infection.  Podiatry will continue to follow while patient is in house with local wound care.    Discussed assessment and plan with Dr. David Lowe DPM.    Ema Albarran DPM   Podiatric Resident PGY1  [employer]  Pager number [phone]  4/20/2024, 11:03 AM

## 2024-04-20 NOTE — PROGRESS NOTES
Admit Date: 4/17/2024  Diet: ADULT DIET; Regular; 3 carb choices (45 gm/meal); Low Fat/Low Chol/High Fiber/2 gm Na; Low Sodium (2 gm)    CC: Multifocal pneumonia    Interval history:   Overnight, there were no acute events.  Patient's vitals remained stable.    Patient was seen this morning in bed.  Patient endorses that he feels much better than he did yesterday.  He endorsed that he is coughing a lot which is very good as the mucus is clear now.  He does not have any further complaints.  I discussed the plan of the day with him and patient agreed to it.  Patient denies fevers, chills,  chest pain, shortness of breath, diarrhea, constipation, dysuria, urinary frequency or urgency.     Plan:     -IV antibiotic was switched to IV Zosyn yesterday due to patient's respiratory culture growing Pseudomonas  -Probiotics  -Recheck Pro-Edin  -Mucinex twice daily  -Out of bed  -PD while inpatient per nephrology    Assessment:   Te Garay is a 59 y.o. male with PMH of poorly controlled DM1 uses insulin pump, has full sequela including nephropathy ESRD on PD, polyneuropathy with history of LLE debridement, CAD status post CABG, HFrEF, seizure disorder, mood disorder  who was admitted with multifocal pneumonia    Multifocal PNA  Patient endorses that when he went home his cough worsened he had worsening productive sputum.  On admission, he was febrile with a Tmax of 100.6.  CT chest showed right middle and bilateral lower lobe ground-glass opacities consistent   with multifocal pneumonia.  He was started on IV antibiotics.  Pro-Edin elevated to 4.50 from 0.32.  Respiratory culture with H. influenzae and Pseudomonas    ESRD on PD  Patient follows with nephrology in the outpatient setting. He is on PD at home.  -Nephrology consulted, appreciate recs     CAD  Patient is s/p CABG. Last echo with EF 35%. At home, patient is on Coreg, Entresto, oral Lasix, Imdur, Ranexa, aspirin,      HLD  At home, patient is on Carvedilol

## 2024-04-21 VITALS
RESPIRATION RATE: 18 BRPM | TEMPERATURE: 97.5 F | DIASTOLIC BLOOD PRESSURE: 82 MMHG | HEIGHT: 68 IN | BODY MASS INDEX: 30.62 KG/M2 | WEIGHT: 202 LBS | HEART RATE: 75 BPM | SYSTOLIC BLOOD PRESSURE: 170 MMHG | OXYGEN SATURATION: 98 %

## 2024-04-21 LAB
ALBUMIN SERPL-MCNC: 3 G/DL (ref 3.4–5)
ALBUMIN/GLOB SERPL: 1 {RATIO} (ref 1.1–2.2)
ALP SERPL-CCNC: 60 U/L (ref 40–129)
ALT SERPL-CCNC: 10 U/L (ref 10–40)
ANION GAP SERPL CALCULATED.3IONS-SCNC: 16 MMOL/L (ref 3–16)
AST SERPL-CCNC: 25 U/L (ref 15–37)
BACTERIA BLD CULT: NORMAL
BACTERIA FLD AEROBE CULT: NORMAL
BILIRUB SERPL-MCNC: 0.3 MG/DL (ref 0–1)
BUN SERPL-MCNC: 48 MG/DL (ref 7–20)
CALCIUM SERPL-MCNC: 8.2 MG/DL (ref 8.3–10.6)
CHLORIDE SERPL-SCNC: 97 MMOL/L (ref 99–110)
CO2 SERPL-SCNC: 24 MMOL/L (ref 21–32)
CREAT SERPL-MCNC: 8.5 MG/DL (ref 0.9–1.3)
EKG ATRIAL RATE: 74 BPM
EKG DIAGNOSIS: NORMAL
EKG P AXIS: 56 DEGREES
EKG P-R INTERVAL: 138 MS
EKG Q-T INTERVAL: 434 MS
EKG QRS DURATION: 100 MS
EKG QTC CALCULATION (BAZETT): 481 MS
EKG R AXIS: 41 DEGREES
EKG T AXIS: 37 DEGREES
EKG VENTRICULAR RATE: 74 BPM
GFR SERPLBLD CREATININE-BSD FMLA CKD-EPI: 7 ML/MIN/{1.73_M2}
GLUCOSE BLD-MCNC: 116 MG/DL (ref 70–99)
GLUCOSE BLD-MCNC: 128 MG/DL (ref 70–99)
GLUCOSE BLD-MCNC: 72 MG/DL (ref 70–99)
GLUCOSE SERPL-MCNC: 83 MG/DL (ref 70–99)
GRAM STAIN RESULT: NORMAL
MAGNESIUM SERPL-MCNC: 2.2 MG/DL (ref 1.8–2.4)
PERFORMED ON: ABNORMAL
PERFORMED ON: ABNORMAL
PERFORMED ON: NORMAL
POTASSIUM SERPL-SCNC: 3.7 MMOL/L (ref 3.5–5.1)
PROCALCITONIN SERPL IA-MCNC: 2.08 NG/ML (ref 0–0.15)
PROT SERPL-MCNC: 6.1 G/DL (ref 6.4–8.2)
REASON FOR REJECTION: NORMAL
REJECTED TEST: NORMAL
SODIUM SERPL-SCNC: 137 MMOL/L (ref 136–145)

## 2024-04-21 PROCEDURE — 83735 ASSAY OF MAGNESIUM: CPT

## 2024-04-21 PROCEDURE — 2580000003 HC RX 258: Performed by: INTERNAL MEDICINE

## 2024-04-21 PROCEDURE — 93010 ELECTROCARDIOGRAM REPORT: CPT | Performed by: INTERNAL MEDICINE

## 2024-04-21 PROCEDURE — 6370000000 HC RX 637 (ALT 250 FOR IP): Performed by: NURSE PRACTITIONER

## 2024-04-21 PROCEDURE — 6370000000 HC RX 637 (ALT 250 FOR IP): Performed by: INTERNAL MEDICINE

## 2024-04-21 PROCEDURE — 36415 COLL VENOUS BLD VENIPUNCTURE: CPT

## 2024-04-21 PROCEDURE — 6370000000 HC RX 637 (ALT 250 FOR IP): Performed by: STUDENT IN AN ORGANIZED HEALTH CARE EDUCATION/TRAINING PROGRAM

## 2024-04-21 PROCEDURE — 6360000002 HC RX W HCPCS: Performed by: STUDENT IN AN ORGANIZED HEALTH CARE EDUCATION/TRAINING PROGRAM

## 2024-04-21 PROCEDURE — 84145 PROCALCITONIN (PCT): CPT

## 2024-04-21 PROCEDURE — 80053 COMPREHEN METABOLIC PANEL: CPT

## 2024-04-21 PROCEDURE — 2580000003 HC RX 258: Performed by: STUDENT IN AN ORGANIZED HEALTH CARE EDUCATION/TRAINING PROGRAM

## 2024-04-21 PROCEDURE — 6370000000 HC RX 637 (ALT 250 FOR IP): Performed by: EMERGENCY MEDICINE

## 2024-04-21 RX ORDER — LEVOFLOXACIN 750 MG/1
750 TABLET, FILM COATED ORAL DAILY
Qty: 7 TABLET | Refills: 0 | Status: ON HOLD | OUTPATIENT
Start: 2024-04-22 | End: 2024-05-02 | Stop reason: HOSPADM

## 2024-04-21 RX ORDER — PROCHLORPERAZINE 25 MG/1
SUPPOSITORY RECTAL
Qty: 1 EACH | Refills: 1 | Status: SHIPPED | OUTPATIENT
Start: 2024-04-21

## 2024-04-21 RX ORDER — LACTOBACILLUS RHAMNOSUS GG 10B CELL
1 CAPSULE ORAL 2 TIMES DAILY WITH MEALS
Qty: 16 CAPSULE | Refills: 0 | Status: ON HOLD | OUTPATIENT
Start: 2024-04-22 | End: 2024-05-02 | Stop reason: HOSPADM

## 2024-04-21 RX ORDER — GUAIFENESIN 600 MG/1
600 TABLET, EXTENDED RELEASE ORAL 2 TIMES DAILY
Qty: 14 TABLET | Refills: 0 | Status: ON HOLD | OUTPATIENT
Start: 2024-04-22 | End: 2024-05-02 | Stop reason: HOSPADM

## 2024-04-21 RX ORDER — PROCHLORPERAZINE 25 MG/1
SUPPOSITORY RECTAL
Qty: 9 EACH | Refills: 3 | Status: SHIPPED | OUTPATIENT
Start: 2024-04-21

## 2024-04-21 RX ORDER — SODIUM BICARBONATE 650 MG/1
1300 TABLET ORAL 3 TIMES DAILY
Qty: 180 TABLET | Refills: 0 | Status: ON HOLD | OUTPATIENT
Start: 2024-04-21 | End: 2024-05-02 | Stop reason: HOSPADM

## 2024-04-21 RX ADMIN — ASPIRIN 81 MG: 81 TABLET, COATED ORAL at 07:10

## 2024-04-21 RX ADMIN — CLOPIDOGREL BISULFATE 75 MG: 75 TABLET ORAL at 07:10

## 2024-04-21 RX ADMIN — SODIUM CHLORIDE, PRESERVATIVE FREE 10 ML: 5 INJECTION INTRAVENOUS at 07:11

## 2024-04-21 RX ADMIN — RANOLAZINE 500 MG: 500 TABLET, EXTENDED RELEASE ORAL at 07:10

## 2024-04-21 RX ADMIN — HEPARIN SODIUM 5000 UNITS: 5000 INJECTION INTRAVENOUS; SUBCUTANEOUS at 06:59

## 2024-04-21 RX ADMIN — GUAIFENESIN 600 MG: 600 TABLET, EXTENDED RELEASE ORAL at 07:11

## 2024-04-21 RX ADMIN — CALCITRIOL CAPSULES 0.25 MCG 0.25 MCG: 0.25 CAPSULE ORAL at 07:10

## 2024-04-21 RX ADMIN — SACUBITRIL AND VALSARTAN 1 TABLET: 49; 51 TABLET, FILM COATED ORAL at 07:10

## 2024-04-21 RX ADMIN — CARVEDILOL 25 MG: 25 TABLET, FILM COATED ORAL at 07:10

## 2024-04-21 RX ADMIN — PIPERACILLIN AND TAZOBACTAM 3375 MG: 3; .375 INJECTION, POWDER, LYOPHILIZED, FOR SOLUTION INTRAVENOUS at 08:49

## 2024-04-21 RX ADMIN — ISOSORBIDE MONONITRATE 30 MG: 30 TABLET, EXTENDED RELEASE ORAL at 07:10

## 2024-04-21 RX ADMIN — Medication 1 CAPSULE: at 07:10

## 2024-04-21 RX ADMIN — GENTAMICIN SULFATE: 1 CREAM TOPICAL at 08:50

## 2024-04-21 RX ADMIN — PIPERACILLIN AND TAZOBACTAM 3375 MG: 3; .375 INJECTION, POWDER, LYOPHILIZED, FOR SOLUTION INTRAVENOUS at 00:53

## 2024-04-21 RX ADMIN — LABETALOL HYDROCHLORIDE 10 MG: 5 INJECTION, SOLUTION INTRAVENOUS at 01:01

## 2024-04-21 RX ADMIN — BENZONATATE 100 MG: 100 CAPSULE ORAL at 07:09

## 2024-04-21 RX ADMIN — FUROSEMIDE 40 MG: 40 TABLET ORAL at 07:10

## 2024-04-21 RX ADMIN — LEVETIRACETAM 500 MG: 500 TABLET, FILM COATED ORAL at 07:10

## 2024-04-21 RX ADMIN — SERTRALINE HYDROCHLORIDE 50 MG: 50 TABLET ORAL at 07:10

## 2024-04-21 RX ADMIN — BENZONATATE 100 MG: 100 CAPSULE ORAL at 01:59

## 2024-04-21 RX ADMIN — SODIUM BICARBONATE 1300 MG: 650 TABLET ORAL at 07:10

## 2024-04-21 ASSESSMENT — PAIN SCALES - GENERAL: PAINLEVEL_OUTOF10: 0

## 2024-04-21 NOTE — CARE COORDINATION
04/21/24 1359   IMM Letter   IMM Letter given to Patient/Family/Significant other/Guardian/POA/by: CM provided copy of IMM Pt aware of d/c order and agreeable to d/c   IMM Letter date given: 04/21/24   IMM Letter time given: 9019     Electronically signed by Adriana Lemus on 4/21/2024 at 2:00 PM

## 2024-04-21 NOTE — PLAN OF CARE
Problem: Safety - Adult  Goal: Free from fall injury  Outcome: Completed     Problem: Discharge Planning  Goal: Discharge to home or other facility with appropriate resources  Outcome: Completed     Problem: Pain  Goal: Verbalizes/displays adequate comfort level or baseline comfort level  Outcome: Completed     Problem: Hematologic - Adult  Goal: Maintains hematologic stability  Outcome: Completed     Problem: Skin/Tissue Integrity - Adult  Goal: Incisions, wounds, or drain sites healing without S/S of infection  Outcome: Completed     Problem: Metabolic/Fluid and Electrolytes - Adult  Goal: Electrolytes maintained within normal limits  Outcome: Completed     Problem: ABCDS Injury Assessment  Goal: Absence of physical injury  Outcome: Completed

## 2024-04-21 NOTE — DIALYSIS
Treatment time: 12 Hours 31 minutes  Net UF: 719 ml  Dwell Time: 1 hr and 5 minutes Gained    Treatment completed without complications or complaints from patient. Effluent clear and lines taped to patient per protocol. Patient resting comfortably with VSS upon exiting room.      Copy of dialysis treatment record placed in chart, to be scanned into EMR and report given to Katarzyna Borrero RN.

## 2024-04-21 NOTE — DISCHARGE SUMMARY
effusion.  The aorta is normal in course and caliber.  The main pulmonary artery is normal in caliber.  There are atherosclerotic calcifications of the aorta and its branch vessels. LUNGS/PLEURA: There is right middle and bilateral lower lobe ground-glass opacities consistent with multifocal pneumonia.  There is no pneumothorax. The trachea and central bronchi are patent. THORACIC NODES: There are no pathologically enlarged mediastinal, hilar or axillary lymph nodes. HEPATOBILIARY: There are no focal hepatic lesions.  The gallbladder is distended and contains gallstones.  There is no biliary ductal dilatation. SPLEEN: Unremarkable. PANCREAS: Unremarkable. ADRENAL GLANDS: Unremarkable. KIDNEYS: The kidneys are normal in size and contour. There is no hydronephrosis. ABDOMINAL NODES: No adenopathy is appreciated. PELVIC ORGANS: The urinary bladder is unremarkable.  The prostate is normal in size.  Fluid noted within the pelvis. PERITONEUM/MESENTERY/BOWEL: The stomach is unremarkable. There is no bowel obstruction. There is no bowel wall thickening. The appendix is normal. Peritoneal dialysis catheter is noted terminating within the left lower quadrant of the abdomen. BONES/SOFT TISSUES: No acute osseous or soft tissue abnormality.     1. Right middle and bilateral lower lobe ground-glass opacities consistent with multifocal pneumonia. 2. Distended gallbladder with gallstones. If there is concern for acute cholecystitis, right upper quadrant ultrasound is recommended.     XR CHEST PORTABLE    Result Date: 4/15/2024  EXAMINATION: ONE XRAY VIEW OF THE CHEST 4/15/2024 9:08 pm COMPARISON: 11/18/2023 HISTORY: ORDERING SYSTEM PROVIDED HISTORY: cough TECHNOLOGIST PROVIDED HISTORY: Reason for exam:->cough Reason for Exam: cough FINDINGS: Heart size is upper limits of normal aorta is normal.  Lungs are normally expanded and clear. No pleural effusions. Mild spondylosis     Lungs are clear       CBC:   Recent Labs

## 2024-04-21 NOTE — PROGRESS NOTES
Pt d/c'd home.  Removed PIV and stopped bleeding.  Catheter intact. Pt tolerated well. No redness noted at site.  Notified [facility] and removed tele box. Reviewed d/c instructions, home meds, and  f/u information utilizing teach-back method.  Scripts for meds & dexcom sipplies given to patient. Patient verbalized understanding. Transported to [facility] via wheelchair

## 2024-04-22 LAB — BACTERIA BLD CULT ORG #2: NORMAL

## 2024-04-29 ENCOUNTER — APPOINTMENT (OUTPATIENT)
Dept: NUCLEAR MEDICINE | Age: 59
DRG: 321 | End: 2024-04-29
Payer: MEDICARE

## 2024-04-29 ENCOUNTER — APPOINTMENT (OUTPATIENT)
Dept: GENERAL RADIOLOGY | Age: 59
DRG: 321 | End: 2024-04-29
Payer: MEDICARE

## 2024-04-29 ENCOUNTER — HOSPITAL ENCOUNTER (INPATIENT)
Age: 59
LOS: 2 days | Discharge: HOME | DRG: 321 | End: 2024-05-02
Attending: INTERNAL MEDICINE | Admitting: HOSPITALIST
Payer: MEDICARE

## 2024-04-29 DIAGNOSIS — N18.4 TYPE 1 DIABETES MELLITUS WITH STAGE 4 CHRONIC KIDNEY DISEASE (HCC): ICD-10-CM

## 2024-04-29 DIAGNOSIS — R79.89 ELEVATED TROPONIN: ICD-10-CM

## 2024-04-29 DIAGNOSIS — R07.9 CHEST PAIN, UNSPECIFIED TYPE: ICD-10-CM

## 2024-04-29 DIAGNOSIS — E10.22 TYPE 1 DIABETES MELLITUS WITH STAGE 4 CHRONIC KIDNEY DISEASE (HCC): ICD-10-CM

## 2024-04-29 DIAGNOSIS — M79.89 RIGHT LEG SWELLING: Primary | ICD-10-CM

## 2024-04-29 PROBLEM — E06.1 DE QUERVAIN THYROIDITIS: Status: ACTIVE | Noted: 2023-06-08

## 2024-04-29 PROBLEM — R46.89 NON-COMPLIANT BEHAVIOR: Status: ACTIVE | Noted: 2022-10-27

## 2024-04-29 PROBLEM — N25.81 HYPERPARATHYROIDISM DUE TO RENAL INSUFFICIENCY (HCC): Status: ACTIVE | Noted: 2024-04-25

## 2024-04-29 LAB
ALBUMIN SERPL-MCNC: 3.4 G/DL (ref 3.4–5)
ALBUMIN/GLOB SERPL: 1 {RATIO} (ref 1.1–2.2)
ALP SERPL-CCNC: 67 U/L (ref 40–129)
ALT SERPL-CCNC: 10 U/L (ref 10–40)
ANION GAP SERPL CALCULATED.3IONS-SCNC: 16 MMOL/L (ref 3–16)
AST SERPL-CCNC: 14 U/L (ref 15–37)
BASOPHILS # BLD: 0 K/UL (ref 0–0.2)
BASOPHILS NFR BLD: 0.8 %
BILIRUB SERPL-MCNC: <0.2 MG/DL (ref 0–1)
BUN SERPL-MCNC: 50 MG/DL (ref 7–20)
CALCIUM SERPL-MCNC: 8.7 MG/DL (ref 8.3–10.6)
CHLORIDE SERPL-SCNC: 102 MMOL/L (ref 99–110)
CO2 SERPL-SCNC: 23 MMOL/L (ref 21–32)
CREAT SERPL-MCNC: 8.5 MG/DL (ref 0.9–1.3)
DEPRECATED RDW RBC AUTO: 16.6 % (ref 12.4–15.4)
EKG ATRIAL RATE: 79 BPM
EKG DIAGNOSIS: NORMAL
EKG P AXIS: 3 DEGREES
EKG P-R INTERVAL: 146 MS
EKG Q-T INTERVAL: 420 MS
EKG QRS DURATION: 100 MS
EKG QTC CALCULATION (BAZETT): 481 MS
EKG R AXIS: 35 DEGREES
EKG T AXIS: -15 DEGREES
EKG VENTRICULAR RATE: 79 BPM
EOSINOPHIL # BLD: 0.1 K/UL (ref 0–0.6)
EOSINOPHIL NFR BLD: 2.1 %
GFR SERPLBLD CREATININE-BSD FMLA CKD-EPI: 7 ML/MIN/{1.73_M2}
GLUCOSE SERPL-MCNC: 94 MG/DL (ref 70–99)
HCT VFR BLD AUTO: 35.8 % (ref 40.5–52.5)
HGB BLD-MCNC: 11.6 G/DL (ref 13.5–17.5)
LYMPHOCYTES # BLD: 1.1 K/UL (ref 1–5.1)
LYMPHOCYTES NFR BLD: 20.4 %
MCH RBC QN AUTO: 28.7 PG (ref 26–34)
MCHC RBC AUTO-ENTMCNC: 32.4 G/DL (ref 31–36)
MCV RBC AUTO: 88.3 FL (ref 80–100)
MONOCYTES # BLD: 0.6 K/UL (ref 0–1.3)
MONOCYTES NFR BLD: 9.9 %
NEUTROPHILS # BLD: 3.7 K/UL (ref 1.7–7.7)
NEUTROPHILS NFR BLD: 66.8 %
NT-PROBNP SERPL-MCNC: 4948 PG/ML (ref 0–124)
PLATELET # BLD AUTO: 291 K/UL (ref 135–450)
PMV BLD AUTO: 8.8 FL (ref 5–10.5)
POTASSIUM SERPL-SCNC: 3.8 MMOL/L (ref 3.5–5.1)
PROT SERPL-MCNC: 6.8 G/DL (ref 6.4–8.2)
RBC # BLD AUTO: 4.05 M/UL (ref 4.2–5.9)
SODIUM SERPL-SCNC: 141 MMOL/L (ref 136–145)
TROPONIN, HIGH SENSITIVITY: 157 NG/L (ref 0–22)
TROPONIN, HIGH SENSITIVITY: 161 NG/L (ref 0–22)
WBC # BLD AUTO: 5.6 K/UL (ref 4–11)

## 2024-04-29 PROCEDURE — 99285 EMERGENCY DEPT VISIT HI MDM: CPT

## 2024-04-29 PROCEDURE — G0378 HOSPITAL OBSERVATION PER HR: HCPCS

## 2024-04-29 PROCEDURE — A9558 XE133 XENON 10MCI: HCPCS | Performed by: PHYSICIAN ASSISTANT

## 2024-04-29 PROCEDURE — 83880 ASSAY OF NATRIURETIC PEPTIDE: CPT

## 2024-04-29 PROCEDURE — 93010 ELECTROCARDIOGRAM REPORT: CPT | Performed by: INTERNAL MEDICINE

## 2024-04-29 PROCEDURE — 3430000000 HC RX DIAGNOSTIC RADIOPHARMACEUTICAL: Performed by: PHYSICIAN ASSISTANT

## 2024-04-29 PROCEDURE — 93005 ELECTROCARDIOGRAM TRACING: CPT | Performed by: PHYSICIAN ASSISTANT

## 2024-04-29 PROCEDURE — 80053 COMPREHEN METABOLIC PANEL: CPT

## 2024-04-29 PROCEDURE — 78582 LUNG VENTILAT&PERFUS IMAGING: CPT

## 2024-04-29 PROCEDURE — 85025 COMPLETE CBC W/AUTO DIFF WBC: CPT

## 2024-04-29 PROCEDURE — 84484 ASSAY OF TROPONIN QUANT: CPT

## 2024-04-29 PROCEDURE — A9540 TC99M MAA: HCPCS | Performed by: PHYSICIAN ASSISTANT

## 2024-04-29 PROCEDURE — 93971 EXTREMITY STUDY: CPT

## 2024-04-29 PROCEDURE — 71046 X-RAY EXAM CHEST 2 VIEWS: CPT

## 2024-04-29 PROCEDURE — 99223 1ST HOSP IP/OBS HIGH 75: CPT | Performed by: HOSPITALIST

## 2024-04-29 RX ORDER — LANOLIN ALCOHOL/MO/W.PET/CERES
6 CREAM (GRAM) TOPICAL NIGHTLY PRN
Status: DISCONTINUED | OUTPATIENT
Start: 2024-04-29 | End: 2024-05-02 | Stop reason: HOSPADM

## 2024-04-29 RX ORDER — HYDRALAZINE HYDROCHLORIDE 100 MG/1
100 TABLET, FILM COATED ORAL 3 TIMES DAILY
COMMUNITY

## 2024-04-29 RX ORDER — XENON XE-133 10 MCI/1
16.3 GAS RESPIRATORY (INHALATION)
Status: COMPLETED | OUTPATIENT
Start: 2024-04-29 | End: 2024-04-29

## 2024-04-29 RX ORDER — ONDANSETRON 4 MG/1
4 TABLET, ORALLY DISINTEGRATING ORAL EVERY 8 HOURS PRN
Status: DISCONTINUED | OUTPATIENT
Start: 2024-04-29 | End: 2024-05-02 | Stop reason: HOSPADM

## 2024-04-29 RX ORDER — ACETAMINOPHEN 325 MG/1
650 TABLET ORAL EVERY 6 HOURS PRN
Status: DISCONTINUED | OUTPATIENT
Start: 2024-04-29 | End: 2024-05-02 | Stop reason: HOSPADM

## 2024-04-29 RX ORDER — ERGOCALCIFEROL 1.25 MG/1
50000 CAPSULE ORAL WEEKLY
COMMUNITY

## 2024-04-29 RX ORDER — ONDANSETRON 2 MG/ML
4 INJECTION INTRAMUSCULAR; INTRAVENOUS EVERY 6 HOURS PRN
Status: DISCONTINUED | OUTPATIENT
Start: 2024-04-29 | End: 2024-05-02 | Stop reason: HOSPADM

## 2024-04-29 RX ORDER — HYDRALAZINE HYDROCHLORIDE 100 MG/1
100 TABLET, FILM COATED ORAL 3 TIMES DAILY
Status: DISCONTINUED | OUTPATIENT
Start: 2024-04-30 | End: 2024-05-02 | Stop reason: HOSPADM

## 2024-04-29 RX ORDER — SODIUM CHLORIDE 0.9 % (FLUSH) 0.9 %
5-40 SYRINGE (ML) INJECTION EVERY 12 HOURS SCHEDULED
Status: DISCONTINUED | OUTPATIENT
Start: 2024-04-30 | End: 2024-05-02 | Stop reason: HOSPADM

## 2024-04-29 RX ORDER — FUROSEMIDE 40 MG/1
40 TABLET ORAL 2 TIMES DAILY
Status: DISCONTINUED | OUTPATIENT
Start: 2024-04-30 | End: 2024-05-01

## 2024-04-29 RX ORDER — ISOSORBIDE MONONITRATE 30 MG/1
30 TABLET, EXTENDED RELEASE ORAL DAILY
Status: DISCONTINUED | OUTPATIENT
Start: 2024-04-30 | End: 2024-05-02 | Stop reason: HOSPADM

## 2024-04-29 RX ORDER — ERGOCALCIFEROL 1.25 MG/1
50000 CAPSULE ORAL WEEKLY
Status: DISCONTINUED | OUTPATIENT
Start: 2024-05-04 | End: 2024-05-02 | Stop reason: HOSPADM

## 2024-04-29 RX ORDER — GUAIFENESIN 600 MG/1
600 TABLET, EXTENDED RELEASE ORAL 2 TIMES DAILY
Status: DISCONTINUED | OUTPATIENT
Start: 2024-04-30 | End: 2024-05-02 | Stop reason: HOSPADM

## 2024-04-29 RX ORDER — CLOPIDOGREL BISULFATE 75 MG/1
75 TABLET ORAL DAILY
Status: DISCONTINUED | OUTPATIENT
Start: 2024-04-30 | End: 2024-05-02 | Stop reason: HOSPADM

## 2024-04-29 RX ORDER — LEVETIRACETAM 500 MG/1
500 TABLET ORAL 2 TIMES DAILY
Status: DISCONTINUED | OUTPATIENT
Start: 2024-04-30 | End: 2024-05-02 | Stop reason: HOSPADM

## 2024-04-29 RX ORDER — NITROGLYCERIN 0.4 MG/1
0.4 TABLET SUBLINGUAL EVERY 5 MIN PRN
Status: DISCONTINUED | OUTPATIENT
Start: 2024-04-29 | End: 2024-05-02 | Stop reason: HOSPADM

## 2024-04-29 RX ORDER — INSULIN LISPRO 100 [IU]/ML
INJECTION, SOLUTION INTRAVENOUS; SUBCUTANEOUS
Qty: 20 ML | Refills: 2 | Status: SHIPPED | OUTPATIENT
Start: 2024-04-29

## 2024-04-29 RX ORDER — POLYETHYLENE GLYCOL 3350 17 G/17G
17 POWDER, FOR SOLUTION ORAL DAILY PRN
Status: DISCONTINUED | OUTPATIENT
Start: 2024-04-29 | End: 2024-05-02 | Stop reason: HOSPADM

## 2024-04-29 RX ORDER — CARVEDILOL 25 MG/1
25 TABLET ORAL 2 TIMES DAILY
Status: DISCONTINUED | OUTPATIENT
Start: 2024-04-30 | End: 2024-05-02 | Stop reason: HOSPADM

## 2024-04-29 RX ORDER — SODIUM CHLORIDE 9 MG/ML
INJECTION, SOLUTION INTRAVENOUS PRN
Status: DISCONTINUED | OUTPATIENT
Start: 2024-04-29 | End: 2024-05-02 | Stop reason: HOSPADM

## 2024-04-29 RX ORDER — ASPIRIN 81 MG/1
81 TABLET ORAL DAILY
Status: DISCONTINUED | OUTPATIENT
Start: 2024-04-30 | End: 2024-05-02 | Stop reason: HOSPADM

## 2024-04-29 RX ORDER — LANOLIN ALCOHOL/MO/W.PET/CERES
6 CREAM (GRAM) TOPICAL NIGHTLY PRN
COMMUNITY

## 2024-04-29 RX ORDER — SODIUM CHLORIDE 0.9 % (FLUSH) 0.9 %
5-40 SYRINGE (ML) INJECTION PRN
Status: DISCONTINUED | OUTPATIENT
Start: 2024-04-29 | End: 2024-05-02 | Stop reason: HOSPADM

## 2024-04-29 RX ORDER — BISACODYL 5 MG/1
5 TABLET, DELAYED RELEASE ORAL DAILY PRN
Status: DISCONTINUED | OUTPATIENT
Start: 2024-04-29 | End: 2024-05-02 | Stop reason: HOSPADM

## 2024-04-29 RX ORDER — ACETAMINOPHEN 650 MG/1
650 SUPPOSITORY RECTAL EVERY 6 HOURS PRN
Status: DISCONTINUED | OUTPATIENT
Start: 2024-04-29 | End: 2024-05-02 | Stop reason: HOSPADM

## 2024-04-29 RX ADMIN — XENON XE-133 16.3 MILLICURIE: 10 GAS RESPIRATORY (INHALATION) at 20:16

## 2024-04-29 RX ADMIN — Medication 3.3 MILLICURIE: at 20:16

## 2024-04-29 ASSESSMENT — ENCOUNTER SYMPTOMS
COUGH: 0
SHORTNESS OF BREATH: 0
VOMITING: 0
ABDOMINAL PAIN: 0
DIARRHEA: 0
NAUSEA: 0
RHINORRHEA: 0

## 2024-04-29 ASSESSMENT — PAIN - FUNCTIONAL ASSESSMENT: PAIN_FUNCTIONAL_ASSESSMENT: NONE - DENIES PAIN

## 2024-04-29 NOTE — TELEPHONE ENCOUNTER
Medication:   Requested Prescriptions     Pending Prescriptions Disp Refills    insulin lispro (HUMALOG) 100 UNIT/ML SOLN injection vial 20 mL 2     Sig: Use 30-40 units daily via pump       Last Filled:      Patient Phone Number: [phone] (home)     Last appt: 8/21/2023   Next appt: 6/11/2024    Last Labs DM:   Lab Results   Component Value Date/Time    LABA1C 9.1 04/17/2024 11:29 PM

## 2024-04-29 NOTE — ED PROVIDER NOTES
[facility] EMERGENCY DEPARTMENT  EMERGENCY DEPARTMENT ENCOUNTER        Pt Name: Te Garay  MRN: [id number]  Birthdate 1965  Date of evaluation: 4/29/2024  Provider: Lucia Goldman PA-C  PCP: Reid Lei MD  Note Started: 7:10 PM EDT 4/29/24       I have seen and evaluated this patient with my supervising physician Reagan Lyman DO.      CHIEF COMPLAINT       Chief Complaint   Patient presents with    Leg Swelling     From home. PT states he was discharged from the hospital a few weeks ago (was in for pneumonia, DKA) and states he was feeling better, swelling has decreased but right lower leg and foot has stayed swollen since, developed a wound a few days ago. PT is peritoneal dialysis PT, states he tried reducing his Na and decreasing fluid intake to ~1L/day but swelling has not improved. Told to come to ED by dialysis doc. States BP has been high. Hx of CHF.       HISTORY OF PRESENT ILLNESS: 1 or more Elements     History From: Patient  Limitations to history : None    Te Garay is a 59 y.o. male who presents to the emergency department today for evaluation for concerns of right leg swelling.  The patient was recently admitted to the hospital for pneumonia, and was found to be in DKA.  The patient states that he was discharged home.  Patient reports that after being treated in the hospital setting, he states that he did gain approximately 10 pounds of fluid.  He reports that he is a peritoneal dialysis patient.  He states that over the past couple of days he has been taking the fluid off with his dialysis, and he states that he is now at his dry weight.  The patient states that he continues to have swelling to his right lower leg, and he states that he is concerned for possible blood clot, which prompted his visit to the ED today.  Patient denies any history of DVT or PE.  The patient reports that over the past 3 nights, he states that he will be awoken out of his sleep with 
severe sepsis or septic shock?   NO    Screenings     [place] Coma Scale  Eye Opening: Spontaneous  Best Verbal Response: Oriented  Best Motor Response: Obeys commands  [place] Coma Scale Score: 15             Patient Referrals:  No follow-up provider specified.    Discharge Medications:  Current Discharge Medication List        START taking these medications    Details   insulin lispro (HUMALOG) 100 UNIT/ML SOLN injection vial Use 30-40 units daily via pump  Qty: 20 mL, Refills: 2    Associated Diagnoses: Type 1 diabetes mellitus with stage 4 chronic kidney disease (HCC)             FINAL IMPRESSION  1. Right leg swelling    2. Chest pain, unspecified type    3. Elevated troponin        Blood pressure (!) 109/59, pulse 78, temperature 97.5 °F (36.4 °C), temperature source Temporal, resp. rate 16, height 1.727 m (5' 8\"), weight 86.6 kg (191 lb), SpO2 98 %.     For further details of Te ZENA Garay's emergency department encounter, please see documentation by advanced practice provider, Lucia Goldman.        Reagan Lyman DO  04/30/24 8051

## 2024-04-30 LAB
ANION GAP SERPL CALCULATED.3IONS-SCNC: 13 MMOL/L (ref 3–16)
BUN SERPL-MCNC: 57 MG/DL (ref 7–20)
CALCIUM SERPL-MCNC: 8.5 MG/DL (ref 8.3–10.6)
CHLORIDE SERPL-SCNC: 100 MMOL/L (ref 99–110)
CHOLEST SERPL-MCNC: 182 MG/DL (ref 0–199)
CO2 SERPL-SCNC: 26 MMOL/L (ref 21–32)
CREAT SERPL-MCNC: 8.8 MG/DL (ref 0.9–1.3)
DEPRECATED RDW RBC AUTO: 16.1 % (ref 12.4–15.4)
EKG ATRIAL RATE: 86 BPM
EKG DIAGNOSIS: NORMAL
EKG P AXIS: 63 DEGREES
EKG P-R INTERVAL: 146 MS
EKG Q-T INTERVAL: 400 MS
EKG QRS DURATION: 98 MS
EKG QTC CALCULATION (BAZETT): 478 MS
EKG R AXIS: 50 DEGREES
EKG T AXIS: 9 DEGREES
EKG VENTRICULAR RATE: 86 BPM
GFR SERPLBLD CREATININE-BSD FMLA CKD-EPI: 6 ML/MIN/{1.73_M2}
GLUCOSE BLD-MCNC: 120 MG/DL (ref 70–99)
GLUCOSE BLD-MCNC: 145 MG/DL (ref 70–99)
GLUCOSE BLD-MCNC: 150 MG/DL (ref 70–99)
GLUCOSE BLD-MCNC: 171 MG/DL (ref 70–99)
GLUCOSE BLD-MCNC: 229 MG/DL (ref 70–99)
GLUCOSE BLD-MCNC: 62 MG/DL (ref 70–99)
GLUCOSE SERPL-MCNC: 197 MG/DL (ref 70–99)
HCT VFR BLD AUTO: 33.7 % (ref 40.5–52.5)
HDLC SERPL-MCNC: 31 MG/DL (ref 40–60)
HGB BLD-MCNC: 11.3 G/DL (ref 13.5–17.5)
LDLC SERPL CALC-MCNC: 111 MG/DL
MCH RBC QN AUTO: 29.2 PG (ref 26–34)
MCHC RBC AUTO-ENTMCNC: 33.4 G/DL (ref 31–36)
MCV RBC AUTO: 87.3 FL (ref 80–100)
PERFORMED ON: ABNORMAL
PLATELET # BLD AUTO: 269 K/UL (ref 135–450)
PMV BLD AUTO: 8.7 FL (ref 5–10.5)
POTASSIUM SERPL-SCNC: 4.4 MMOL/L (ref 3.5–5.1)
RBC # BLD AUTO: 3.86 M/UL (ref 4.2–5.9)
SODIUM SERPL-SCNC: 139 MMOL/L (ref 136–145)
TRIGL SERPL-MCNC: 201 MG/DL (ref 0–150)
VLDLC SERPL CALC-MCNC: 40 MG/DL
WBC # BLD AUTO: 6.9 K/UL (ref 4–11)

## 2024-04-30 PROCEDURE — 93005 ELECTROCARDIOGRAM TRACING: CPT | Performed by: HOSPITALIST

## 2024-04-30 PROCEDURE — 6370000000 HC RX 637 (ALT 250 FOR IP): Performed by: HOSPITALIST

## 2024-04-30 PROCEDURE — 80048 BASIC METABOLIC PNL TOTAL CA: CPT

## 2024-04-30 PROCEDURE — 99222 1ST HOSP IP/OBS MODERATE 55: CPT | Performed by: INTERNAL MEDICINE

## 2024-04-30 PROCEDURE — 90945 DIALYSIS ONE EVALUATION: CPT

## 2024-04-30 PROCEDURE — 6370000000 HC RX 637 (ALT 250 FOR IP): Performed by: INTERNAL MEDICINE

## 2024-04-30 PROCEDURE — 6370000000 HC RX 637 (ALT 250 FOR IP): Performed by: STUDENT IN AN ORGANIZED HEALTH CARE EDUCATION/TRAINING PROGRAM

## 2024-04-30 PROCEDURE — 36415 COLL VENOUS BLD VENIPUNCTURE: CPT

## 2024-04-30 PROCEDURE — 3E1M39Z IRRIGATION OF PERITONEAL CAVITY USING DIALYSATE, PERCUTANEOUS APPROACH: ICD-10-PCS | Performed by: HOSPITALIST

## 2024-04-30 PROCEDURE — 2580000003 HC RX 258: Performed by: HOSPITALIST

## 2024-04-30 PROCEDURE — 85027 COMPLETE CBC AUTOMATED: CPT

## 2024-04-30 PROCEDURE — 1200000000 HC SEMI PRIVATE

## 2024-04-30 PROCEDURE — 93306 TTE W/DOPPLER COMPLETE: CPT

## 2024-04-30 PROCEDURE — 93010 ELECTROCARDIOGRAM REPORT: CPT | Performed by: INTERNAL MEDICINE

## 2024-04-30 PROCEDURE — 80061 LIPID PANEL: CPT

## 2024-04-30 RX ORDER — GENTAMICIN SULFATE 1 MG/G
CREAM TOPICAL DAILY
Status: DISCONTINUED | OUTPATIENT
Start: 2024-04-30 | End: 2024-05-02 | Stop reason: HOSPADM

## 2024-04-30 RX ORDER — DEXTROSE MONOHYDRATE 100 MG/ML
INJECTION, SOLUTION INTRAVENOUS CONTINUOUS PRN
Status: DISCONTINUED | OUTPATIENT
Start: 2024-04-30 | End: 2024-05-02 | Stop reason: HOSPADM

## 2024-04-30 RX ORDER — GLUCAGON 1 MG/ML
1 KIT INJECTION PRN
Status: DISCONTINUED | OUTPATIENT
Start: 2024-04-30 | End: 2024-05-02 | Stop reason: HOSPADM

## 2024-04-30 RX ORDER — SODIUM CHLORIDE, SODIUM LACTATE, CALCIUM CHLORIDE, MAGNESIUM CHLORIDE AND DEXTROSE 2.5; 538; 448; 18.3; 5.08 G/100ML; MG/100ML; MG/100ML; MG/100ML; MG/100ML
5000 INJECTION, SOLUTION INTRAPERITONEAL CONTINUOUS
Status: DISCONTINUED | OUTPATIENT
Start: 2024-04-30 | End: 2024-04-30 | Stop reason: ALTCHOICE

## 2024-04-30 RX ORDER — RANOLAZINE 500 MG/1
500 TABLET, EXTENDED RELEASE ORAL 2 TIMES DAILY
Status: DISCONTINUED | OUTPATIENT
Start: 2024-04-30 | End: 2024-05-02 | Stop reason: HOSPADM

## 2024-04-30 RX ADMIN — GENTAMICIN SULFATE: 1 CREAM TOPICAL at 20:52

## 2024-04-30 RX ADMIN — CARVEDILOL 25 MG: 25 TABLET, FILM COATED ORAL at 08:30

## 2024-04-30 RX ADMIN — SODIUM CHLORIDE, PRESERVATIVE FREE 10 ML: 5 INJECTION INTRAVENOUS at 20:50

## 2024-04-30 RX ADMIN — HYDRALAZINE HYDROCHLORIDE 100 MG: 100 TABLET, FILM COATED ORAL at 00:40

## 2024-04-30 RX ADMIN — CARVEDILOL 25 MG: 25 TABLET, FILM COATED ORAL at 00:30

## 2024-04-30 RX ADMIN — GUAIFENESIN 600 MG: 600 TABLET, EXTENDED RELEASE ORAL at 20:51

## 2024-04-30 RX ADMIN — RANOLAZINE 500 MG: 500 TABLET, EXTENDED RELEASE ORAL at 14:44

## 2024-04-30 RX ADMIN — SACUBITRIL AND VALSARTAN 1 TABLET: 49; 51 TABLET, FILM COATED ORAL at 08:30

## 2024-04-30 RX ADMIN — GUAIFENESIN 600 MG: 600 TABLET, EXTENDED RELEASE ORAL at 08:30

## 2024-04-30 RX ADMIN — RANOLAZINE 500 MG: 500 TABLET, EXTENDED RELEASE ORAL at 20:51

## 2024-04-30 RX ADMIN — SODIUM CHLORIDE, PRESERVATIVE FREE 10 ML: 5 INJECTION INTRAVENOUS at 08:30

## 2024-04-30 RX ADMIN — HYDRALAZINE HYDROCHLORIDE 100 MG: 100 TABLET, FILM COATED ORAL at 20:51

## 2024-04-30 RX ADMIN — SACUBITRIL AND VALSARTAN 1 TABLET: 49; 51 TABLET, FILM COATED ORAL at 20:51

## 2024-04-30 RX ADMIN — ASPIRIN 81 MG: 81 TABLET, COATED ORAL at 08:30

## 2024-04-30 RX ADMIN — SERTRALINE HYDROCHLORIDE 50 MG: 50 TABLET ORAL at 08:30

## 2024-04-30 RX ADMIN — CLOPIDOGREL BISULFATE 75 MG: 75 TABLET ORAL at 08:30

## 2024-04-30 RX ADMIN — MELATONIN TAB 3 MG 6 MG: 3 TAB at 22:10

## 2024-04-30 RX ADMIN — CARVEDILOL 25 MG: 25 TABLET, FILM COATED ORAL at 20:50

## 2024-04-30 RX ADMIN — SACUBITRIL AND VALSARTAN 1 TABLET: 49; 51 TABLET, FILM COATED ORAL at 00:30

## 2024-04-30 RX ADMIN — ISOSORBIDE MONONITRATE 30 MG: 30 TABLET, EXTENDED RELEASE ORAL at 08:30

## 2024-04-30 RX ADMIN — LEVETIRACETAM 500 MG: 500 TABLET, FILM COATED ORAL at 20:51

## 2024-04-30 RX ADMIN — FUROSEMIDE 40 MG: 40 TABLET ORAL at 08:30

## 2024-04-30 RX ADMIN — LEVETIRACETAM 500 MG: 500 TABLET, FILM COATED ORAL at 08:30

## 2024-04-30 RX ADMIN — LEVETIRACETAM 500 MG: 500 TABLET, FILM COATED ORAL at 00:30

## 2024-04-30 RX ADMIN — FUROSEMIDE 40 MG: 40 TABLET ORAL at 17:46

## 2024-04-30 RX ADMIN — HYDRALAZINE HYDROCHLORIDE 100 MG: 100 TABLET, FILM COATED ORAL at 08:30

## 2024-04-30 RX ADMIN — GUAIFENESIN 600 MG: 600 TABLET, EXTENDED RELEASE ORAL at 00:30

## 2024-04-30 ASSESSMENT — ENCOUNTER SYMPTOMS
BACK PAIN: 0
DIARRHEA: 0
WHEEZING: 0
COUGH: 0
SHORTNESS OF BREATH: 0
CONSTIPATION: 0
ABDOMINAL PAIN: 0
BLOOD IN STOOL: 0
NAUSEA: 0
VOMITING: 0

## 2024-04-30 ASSESSMENT — PAIN DESCRIPTION - ORIENTATION
ORIENTATION: UPPER;MID
ORIENTATION: LEFT

## 2024-04-30 ASSESSMENT — PAIN DESCRIPTION - DESCRIPTORS: DESCRIPTORS: ACHING;TIGHTNESS

## 2024-04-30 ASSESSMENT — PAIN DESCRIPTION - LOCATION
LOCATION: CHEST
LOCATION: FOOT

## 2024-04-30 ASSESSMENT — PAIN - FUNCTIONAL ASSESSMENT: PAIN_FUNCTIONAL_ASSESSMENT: ACTIVITIES ARE NOT PREVENTED

## 2024-04-30 ASSESSMENT — PAIN DESCRIPTION - FREQUENCY: FREQUENCY: INTERMITTENT

## 2024-04-30 ASSESSMENT — PAIN DESCRIPTION - PAIN TYPE: TYPE: ACUTE PAIN

## 2024-04-30 ASSESSMENT — PAIN SCALES - GENERAL
PAINLEVEL_OUTOF10: 3
PAINLEVEL_OUTOF10: 1

## 2024-04-30 NOTE — ED NOTES
How does patient ambulate?   [x]Low Fall Risk (ambulates by themselves without support)  []Stand by assist   []Contact Guard   []Front wheel walker  []Wheelchair   []Steady  []Bed bound  []History of Lower Extremity Amputation  []Unknown, did not assess in the emergency department   How does patient take pills?  [x]Whole with Water  []Crushed in applesauce  []Crushed in pudding  []Other  []Unknown no oral medications were given in the ED  Is patient alert?   [x]Alert  []Drowsy but responds to voice  []Doesn't respond to voice but responds to painful stimuli  []Unresponsive  Is patient oriented?   [x]To person  [x]To place  [x]To time  [x]To situation  []Confused  []Agitated  []Follows commands  If patient is disoriented or from a Skill Nursing Facility has family been notified of admission?   [x]Yes   []No  Patient belongings?   [x]Cell phone  [x]Wallet   []Dentures  [x]Clothing  Any specific patient or family belongings/needs/dynamics?   na  Miscellaneous comments/pending orders?  na    If there are any additional questions please reach out to the Emergency Department.

## 2024-04-30 NOTE — H&P
hypokinesis grade 1 diastolic dysfunction continue Coreg, Crestor  -Consult nephrology for PD manage  -Continue Keppra 500 twice daily    Diet Diabetic renal diet   DVT Prophylaxis [] Lovenox, [x]  Heparin, [] SCDs, [] Ambulation   GI Prophylaxis [] PPI,  [] H2 Blocker,  [] Carafate,  [] Diet/Tube Feeds   Code Status Full Code   Disposition Patient requires continued admission due to chest pain with shortness   MDM [] Low, [] Moderate,[x]  High  Patient's risk as above     History of Present Illness:     Chief Complaint: <principal problem not specified>  Te Garay is a 59 y.o.  male  PMH of uncontrolled type 1 diabetes with history of DKA on insulin pump, ESRD on peritoneal dialysis, CAD status post CABG, chronic HFrEF with EF 35%, seizure disorder on Keppra, mood disorder recently hospitalized from 4/17-4/21 for multifocal pneumonia secondary to H influenza and Pseudomonas.  Patient reports he was in DKA during his last hospitalization.  His sugar was so high that it was pulling fluid into his tissues.  Since discharge he has been aggressively taking off higher numbers of UF with his peritoneal dialysis.  He managed to get back near his dry weight but his right lower extremity remained swollen.  He called and spoke with the dialysis nurse who in turn talked to his nephrologist.  They recommended that he come in and get his leg evaluated.  Additionally patient has had episodes of chest pain that wake him up at night.  He has been religiously checking his blood pressure with these episodes and his systolic blood pressure has been in the 170s to 180s.  He tells me that he recently had some adjustments to his antihypertensive regimen because he was having orthostatic hypotension.  He was taken off nifedipine and hydralazine then restarted on hydralazine.  He has noted that the chest discomfort gets better with breathing exercises, taking his medications and rest.  While interviewing Mr. Garay he became

## 2024-04-30 NOTE — CARE COORDINATION
Discharge Planning Assessment:     Patient admitted as Observation/OPIB with an anticipated short hospitalization length of stay. Chart reviewed and it appears that patient has minimal needs for discharge at this time. Discussed with patient’s nurse and requested that case management be notified if discharge needs are identified.     *Case management will continue to follow progress and update discharge plan as needed.    Ava Gaston RN Case Manager  [phone]

## 2024-04-30 NOTE — PROGRESS NOTES
Physician Progress Note      PATIENT:               DIDIER RUVALCABA  CSN #:                  [id number]  :                       1965  ADMIT DATE:       4/15/2024 8:22 PM  DISCH DATE:        2024 6:48 PM  RESPONDING  PROVIDER #:        IQRA EUBANKS MD          QUERY TEXT:    Pt admitted with insulin pump and has DKA. Documentation states that insulin   pump has not been working as of late (ED), pump wire was kinked for a few   hours today (HP). If possible, please document in progress notes and discharge   summary the relationship, if any, between DKA and insulin pump kinking/not   working.    The medical record reflects the following:  Risk Factors: 60 yo with T1DM  Clinical Indicators: HP, \"wife noted that his insulin pump was kinked sometime   while in the ED after patient had a fall.  He states that the insulin pump   was working this morning through breakfast and lunch.\"  ED, \"Typically utilizes an insulin pump but it has not been working as of   late; he thinks it is an issue with the pump's tubing currently\"  Treatment: IV and subq insulin, transitioned back to insulin pump after ag   closed  Options provided:  -- DKA due to insulin pump kinking/not working  -- DKA unrelated to insulin pump kinking/not working  -- Other - I will add my own diagnosis  -- Disagree - Not applicable / Not valid  -- Disagree - Clinically unable to determine / Unknown  -- Refer to Clinical Documentation Reviewer    PROVIDER RESPONSE TEXT:    This patient has DKA due to insulin pump kinking/not working.    Query created by: Alba Redmond on 2024 9:51 AM      Electronically signed by:  IQRA EUBANKS MD 2024 10:37 PM

## 2024-04-30 NOTE — CARE COORDINATION
04/30/24 1452   IMM Letter   IMM Letter given to Patient/Family/Significant other/Guardian/POA/by: EMA Ma   IMM Letter date given: 04/30/24   IMM Letter time given: 1408 04/30/24 1453   Readmission Assessment   Number of Days since last admission? 1-7 days   Previous Disposition Home with Family   Who is being Interviewed Patient   What was the patient's/caregiver's perception as to why they think they needed to return back to the hospital? Other (Comment)  (leg swelling)   Did you visit your Primary Care Physician after you left the hospital, before you returned this time? Yes   Did you see a specialist, such as Cardiac, Pulmonary, Orthopedic Physician, etc. after you left the hospital? No   Who advised the patient to return to the hospital? Self-referral   Does the patient report anything that got in the way of taking their medications? No   In our efforts to provide the best possible care to you and others like you, can you think of anything that we could have done to help you after you left the hospital the first time, so that you might not have needed to return so soon? Other (Comment)  (nothing)         Case Management Assessment  Initial Evaluation    Date/Time of Evaluation: 4/30/2024 2:56 PM  Assessment Completed by: FADI BLANC    If patient is discharged prior to next notation, then this note serves as note for discharge by case management.    Patient Name: Te Garay                   YOB: 1965  Diagnosis: Chest pain [R07.9]  Elevated troponin [R79.89]  Right leg swelling [M79.89]  Chest pain, unspecified type [R07.9]                   Date / Time: 4/29/2024 12:27 PM    Patient Admission Status: Inpatient   Readmission Risk (Low < 19, Mod (19-27), High > 27): Readmission Risk Score: 26.5    Current PCP: Reid Lei MD  PCP verified by CM? Yes    Chart Reviewed: Yes      History Provided by: Patient  Patient Orientation: Alert and

## 2024-04-30 NOTE — PROGRESS NOTES
Physician Progress Note      PATIENT:               DIDIER RUVALCABA  [facility] #:                  [id number]  :                       1965  ADMIT DATE:       2024 7:36 PM  DISCH DATE:        2024 1:38 PM  RESPONDING  PROVIDER #:        IQRA EUBANKS MD          QUERY TEXT:    Pt admitted with Multifocal pneumonia.  Pt noted to have positive respiratory   cultures of H influenzae and Pseudomonas. If possible, please document in the   progress notes and discharge summary if you are evaluating and/or treating any   of the following:    The medical record reflects the following:  Risk Factors: Multifocal PNA, ESRD, DM2 in DKA    Clinical Indicators: Per DC summary- \"Multifocal PNA...CT chest showed right   middle and bilateral lower lobe ground-glass opacities consistent with   multifocal pneumonia...Respiratory culture with H. influenzae and Pseudomonas\"    Treatment: IV Rocephin/Doxycycline changed to IV Zosyn on  and discharged   home on po Levaquin x 7days    Thank you,  Apurva Lassiter RN BSN CRCR CCDS  [email or web address]  Options provided:  -- H. influenzae and Pseudomonas pneumonia  -- Other - I will add my own diagnosis  -- Disagree - Not applicable / Not valid  -- Disagree - Clinically unable to determine / Unknown  -- Refer to Clinical Documentation Reviewer    PROVIDER RESPONSE TEXT:    This patient has H. influenzae and Pseudomonas pneumonia.    Query created by: Nidia Lassiter on 2024 9:39 AM      Electronically signed by:  IQRA EUBANKS MD 2024 10:36 PM

## 2024-04-30 NOTE — ACP (ADVANCE CARE PLANNING)
Advanced Care Planning Note    Purpose of Encounter: Advanced care planning in light of chest pain  Parties In Attendance: Patient, Sachi (Healthcare POA)  Decisional Capacity: Yes  Subjective: Patient had CP  Objective: Cr 8.5  Goals of Care Determination: Patient wants full support  Plan:  Ranexa + Imdur, Cardio c/s  Code Status: Full   Time spent on Advanced care Plannin minutes  Advanced Care Planning Documents: Completed advanced directives on chart, Sachi, is the Healthcare POA.    Jeyson Fernandez MD  2024 12:54 PM

## 2024-04-30 NOTE — CONSULTS
[facility]  H+P  Consult  OP Visit  FU Visit   CC/HPI   CC New patient visit for chest pain.   HPI 59 y.o., male sent to ER by nephrologist for right leg swelling/wound. Pt also c/o for recurrent angina at night relieved with nitro.  CP substernal, pressure, lasts 5-10 minutes.  Not active.  Relieved nitro.   Cardiac Hx PCI, CABG   Troponin Lab Results   Component Value Date    TROPHS 157 (H) 04/29/2024    TROPHS 161 (H) 04/29/2024    TROPHS 91 (H) 04/17/2024      HISTORY/ALLERGY/ROS   MEDHx  has a past medical history of Angina at rest ([facility]), Cardiomyopathy (HCC), Carotid artery stenosis, CHF (congestive heart failure) (HCC), CKD (chronic kidney disease) stage 2, GFR 60-89 ml/min, Coronary artery disease, Diabetic peripheral neuropathy (HCC), Diastolic HF (heart failure) ([facility]), Hyperlipidemia with target LDL less than 70, Hypertension goal BP (blood pressure) < 130/80, PVD (peripheral vascular disease) ([facility]), Seizures ([facility]), and Type 1 diabetes mellitus with chronic kidney disease ([facility]).   SURGHx  has a past surgical history that includes Cardiac surgery (2001); Coronary artery bypass graft; Cardiac catheterization; hernia repair; Tonsillectomy; LAPAROSCOPY INSERTION PERITONEAL CATHETER (N/A, 06/03/2020); Carotid stent placement (Right); Bladder surgery (Left, 08/24/2023); Foot Debridement (Left, 10/16/2023); and Foot Debridement (Left, 10/23/2023).   SOCHx  reports that he has never smoked. He has never used smokeless tobacco. He reports that he does not currently use alcohol. He reports that he does not use drugs.   FAMHx family history includes Arthritis in his brother; Coronary Art Dis in his father and paternal grandmother; Diabetes in his father and paternal grandmother; Heart Murmur in his sister; Hypertension in his mother; Mult Sclerosis in his brother; Seizures in his mother; Sleep Apnea in his brother; Thyroid Disease in his sister.   ALLERG Iodides, Shellfish-derived products, Lipitor 
current regimen    Anemia in CKD  Hemoglobin at goal  No need of ESAs    Thank you for allowing me to participate in the care of this patient.  I will continue to follow along.  Please call with questions.    Halima Neumann MD  Office Phone : [phone]  4/30/2024

## 2024-04-30 NOTE — PLAN OF CARE
Problem: Discharge Planning  Goal: Discharge to home or other facility with appropriate resources  Outcome: Progressing  Flowsheets  Taken 4/30/2024 0013  Discharge to home or other facility with appropriate resources: Identify barriers to discharge with patient and caregiver  Taken 4/29/2024 2315  Discharge to home or other facility with appropriate resources: Identify barriers to discharge with patient and caregiver     Problem: Safety - Adult  Goal: Free from fall injury  Outcome: Progressing     Problem: Respiratory - Adult  Goal: Achieves optimal ventilation and oxygenation  Outcome: Progressing     Problem: Cardiovascular - Adult  Goal: Maintains optimal cardiac output and hemodynamic stability  Outcome: Progressing  Goal: Absence of cardiac dysrhythmias or at baseline  Outcome: Progressing     Problem: Skin/Tissue Integrity - Adult  Goal: Skin integrity remains intact  Outcome: Progressing  Goal: Incisions, wounds, or drain sites healing without S/S of infection  Outcome: Progressing  Goal: Oral mucous membranes remain intact  Outcome: Progressing     Problem: Infection - Adult  Goal: Absence of infection at discharge  Outcome: Progressing  Goal: Absence of infection during hospitalization  Outcome: Progressing     Problem: Metabolic/Fluid and Electrolytes - Adult  Goal: Electrolytes maintained within normal limits  Outcome: Progressing  Flowsheets (Taken 4/29/2024 2315)  Electrolytes maintained within normal limits:   Monitor labs and assess patient for signs and symptoms of electrolyte imbalances   Administer electrolyte replacement as ordered   Monitor response to electrolyte replacements, including repeat lab results as appropriate  Goal: Hemodynamic stability and optimal renal function maintained  Outcome: Progressing  Goal: Glucose maintained within prescribed range  Outcome: Progressing     Problem: Hematologic - Adult  Goal: Maintains hematologic stability  Outcome: Progressing  Flowsheets (Taken

## 2024-05-01 DIAGNOSIS — I25.10 CORONARY ARTERY DISEASE DUE TO LIPID RICH PLAQUE: Primary | ICD-10-CM

## 2024-05-01 DIAGNOSIS — I25.83 CORONARY ARTERY DISEASE DUE TO LIPID RICH PLAQUE: Primary | ICD-10-CM

## 2024-05-01 LAB
ALBUMIN SERPL-MCNC: 3.3 G/DL (ref 3.4–5)
ALBUMIN/GLOB SERPL: 1 {RATIO} (ref 1.1–2.2)
ALP SERPL-CCNC: 74 U/L (ref 40–129)
ALT SERPL-CCNC: 9 U/L (ref 10–40)
ANION GAP SERPL CALCULATED.3IONS-SCNC: 18 MMOL/L (ref 3–16)
AST SERPL-CCNC: 14 U/L (ref 15–37)
BASOPHILS # BLD: 0 K/UL (ref 0–0.2)
BASOPHILS NFR BLD: 0.5 %
BILIRUB SERPL-MCNC: 0.3 MG/DL (ref 0–1)
BUN SERPL-MCNC: 60 MG/DL (ref 7–20)
CALCIUM SERPL-MCNC: 8.6 MG/DL (ref 8.3–10.6)
CHLORIDE SERPL-SCNC: 101 MMOL/L (ref 99–110)
CO2 SERPL-SCNC: 18 MMOL/L (ref 21–32)
CREAT SERPL-MCNC: 9 MG/DL (ref 0.9–1.3)
DEPRECATED RDW RBC AUTO: 16.4 % (ref 12.4–15.4)
EOSINOPHIL # BLD: 0.1 K/UL (ref 0–0.6)
EOSINOPHIL NFR BLD: 2.6 %
GFR SERPLBLD CREATININE-BSD FMLA CKD-EPI: 6 ML/MIN/{1.73_M2}
GLUCOSE BLD-MCNC: 111 MG/DL (ref 70–99)
GLUCOSE BLD-MCNC: 112 MG/DL (ref 70–99)
GLUCOSE BLD-MCNC: 233 MG/DL (ref 70–99)
GLUCOSE BLD-MCNC: 285 MG/DL (ref 70–99)
GLUCOSE BLD-MCNC: 286 MG/DL (ref 70–99)
GLUCOSE SERPL-MCNC: 247 MG/DL (ref 70–99)
HCT VFR BLD AUTO: 35.1 % (ref 40.5–52.5)
HGB BLD-MCNC: 11.7 G/DL (ref 13.5–17.5)
LYMPHOCYTES # BLD: 1.2 K/UL (ref 1–5.1)
LYMPHOCYTES NFR BLD: 24.2 %
MAGNESIUM SERPL-MCNC: 1.9 MG/DL (ref 1.8–2.4)
MCH RBC QN AUTO: 29.2 PG (ref 26–34)
MCHC RBC AUTO-ENTMCNC: 33.2 G/DL (ref 31–36)
MCV RBC AUTO: 88.1 FL (ref 80–100)
MONOCYTES # BLD: 0.5 K/UL (ref 0–1.3)
MONOCYTES NFR BLD: 9.3 %
NEUTROPHILS # BLD: 3.1 K/UL (ref 1.7–7.7)
NEUTROPHILS NFR BLD: 63.4 %
PERFORMED ON: ABNORMAL
PLATELET # BLD AUTO: 237 K/UL (ref 135–450)
PMV BLD AUTO: 9 FL (ref 5–10.5)
POTASSIUM SERPL-SCNC: 4 MMOL/L (ref 3.5–5.1)
PROT SERPL-MCNC: 6.6 G/DL (ref 6.4–8.2)
RBC # BLD AUTO: 3.99 M/UL (ref 4.2–5.9)
SODIUM SERPL-SCNC: 137 MMOL/L (ref 136–145)
WBC # BLD AUTO: 4.9 K/UL (ref 4–11)

## 2024-05-01 PROCEDURE — 2000000000 HC ICU R&B

## 2024-05-01 PROCEDURE — 027034Z DILATION OF CORONARY ARTERY, ONE ARTERY WITH DRUG-ELUTING INTRALUMINAL DEVICE, PERCUTANEOUS APPROACH: ICD-10-PCS | Performed by: INTERNAL MEDICINE

## 2024-05-01 PROCEDURE — 99152 MOD SED SAME PHYS/QHP 5/>YRS: CPT

## 2024-05-01 PROCEDURE — 99153 MOD SED SAME PHYS/QHP EA: CPT

## 2024-05-01 PROCEDURE — C1894 INTRO/SHEATH, NON-LASER: HCPCS

## 2024-05-01 PROCEDURE — B2151ZZ FLUOROSCOPY OF LEFT HEART USING LOW OSMOLAR CONTRAST: ICD-10-PCS | Performed by: INTERNAL MEDICINE

## 2024-05-01 PROCEDURE — B2131ZZ FLUOROSCOPY OF MULTIPLE CORONARY ARTERY BYPASS GRAFTS USING LOW OSMOLAR CONTRAST: ICD-10-PCS | Performed by: INTERNAL MEDICINE

## 2024-05-01 PROCEDURE — 6360000002 HC RX W HCPCS

## 2024-05-01 PROCEDURE — 6370000000 HC RX 637 (ALT 250 FOR IP): Performed by: STUDENT IN AN ORGANIZED HEALTH CARE EDUCATION/TRAINING PROGRAM

## 2024-05-01 PROCEDURE — 4A023N7 MEASUREMENT OF CARDIAC SAMPLING AND PRESSURE, LEFT HEART, PERCUTANEOUS APPROACH: ICD-10-PCS | Performed by: INTERNAL MEDICINE

## 2024-05-01 PROCEDURE — 36415 COLL VENOUS BLD VENIPUNCTURE: CPT

## 2024-05-01 PROCEDURE — 6370000000 HC RX 637 (ALT 250 FOR IP): Performed by: HOSPITALIST

## 2024-05-01 PROCEDURE — 93005 ELECTROCARDIOGRAM TRACING: CPT | Performed by: INTERNAL MEDICINE

## 2024-05-01 PROCEDURE — 93459 L HRT ART/GRFT ANGIO: CPT | Performed by: INTERNAL MEDICINE

## 2024-05-01 PROCEDURE — 85025 COMPLETE CBC W/AUTO DIFF WBC: CPT

## 2024-05-01 PROCEDURE — 6360000002 HC RX W HCPCS: Performed by: INTERNAL MEDICINE

## 2024-05-01 PROCEDURE — C1725 CATH, TRANSLUMIN NON-LASER: HCPCS

## 2024-05-01 PROCEDURE — 83735 ASSAY OF MAGNESIUM: CPT

## 2024-05-01 PROCEDURE — C1874 STENT, COATED/COV W/DEL SYS: HCPCS

## 2024-05-01 PROCEDURE — B2111ZZ FLUOROSCOPY OF MULTIPLE CORONARY ARTERIES USING LOW OSMOLAR CONTRAST: ICD-10-PCS | Performed by: INTERNAL MEDICINE

## 2024-05-01 PROCEDURE — C1769 GUIDE WIRE: HCPCS

## 2024-05-01 PROCEDURE — 2580000003 HC RX 258: Performed by: HOSPITALIST

## 2024-05-01 PROCEDURE — C9604 PERC D-E COR REVASC T CABG S: HCPCS

## 2024-05-01 PROCEDURE — 80053 COMPREHEN METABOLIC PANEL: CPT

## 2024-05-01 PROCEDURE — 90945 DIALYSIS ONE EVALUATION: CPT

## 2024-05-01 PROCEDURE — 99152 MOD SED SAME PHYS/QHP 5/>YRS: CPT | Performed by: INTERNAL MEDICINE

## 2024-05-01 PROCEDURE — 2709999900 HC NON-CHARGEABLE SUPPLY

## 2024-05-01 PROCEDURE — 1200000000 HC SEMI PRIVATE

## 2024-05-01 PROCEDURE — C1887 CATHETER, GUIDING: HCPCS

## 2024-05-01 PROCEDURE — 92937 PRQ TRLUML REVSC CAB GRF 1: CPT | Performed by: INTERNAL MEDICINE

## 2024-05-01 PROCEDURE — 93455 CORONARY ART/GRFT ANGIO S&I: CPT

## 2024-05-01 PROCEDURE — 2580000003 HC RX 258

## 2024-05-01 PROCEDURE — 2500000003 HC RX 250 WO HCPCS

## 2024-05-01 PROCEDURE — 99232 SBSQ HOSP IP/OBS MODERATE 35: CPT | Performed by: INTERNAL MEDICINE

## 2024-05-01 RX ORDER — ACETAMINOPHEN 325 MG/1
650 TABLET ORAL EVERY 4 HOURS PRN
Status: DISCONTINUED | OUTPATIENT
Start: 2024-05-01 | End: 2024-05-02 | Stop reason: HOSPADM

## 2024-05-01 RX ORDER — NITROGLYCERIN 20 MG/100ML
5-200 INJECTION INTRAVENOUS CONTINUOUS
Status: DISCONTINUED | OUTPATIENT
Start: 2024-05-01 | End: 2024-05-02

## 2024-05-01 RX ORDER — ONDANSETRON 2 MG/ML
4 INJECTION INTRAMUSCULAR; INTRAVENOUS EVERY 6 HOURS PRN
Status: DISCONTINUED | OUTPATIENT
Start: 2024-05-01 | End: 2024-05-02 | Stop reason: HOSPADM

## 2024-05-01 RX ORDER — FUROSEMIDE 40 MG/1
40 TABLET ORAL 2 TIMES DAILY
Status: DISCONTINUED | OUTPATIENT
Start: 2024-05-02 | End: 2024-05-02 | Stop reason: HOSPADM

## 2024-05-01 RX ORDER — ATROPINE SULFATE 0.1 MG/ML
INJECTION INTRAVENOUS
Status: DISCONTINUED
Start: 2024-05-01 | End: 2024-05-01 | Stop reason: WASHOUT

## 2024-05-01 RX ADMIN — CLOPIDOGREL BISULFATE 75 MG: 75 TABLET ORAL at 08:34

## 2024-05-01 RX ADMIN — ASPIRIN 81 MG: 81 TABLET, COATED ORAL at 08:33

## 2024-05-01 RX ADMIN — ISOSORBIDE MONONITRATE 30 MG: 30 TABLET, EXTENDED RELEASE ORAL at 08:34

## 2024-05-01 RX ADMIN — CARVEDILOL 25 MG: 25 TABLET, FILM COATED ORAL at 08:34

## 2024-05-01 RX ADMIN — SODIUM CHLORIDE, PRESERVATIVE FREE 10 ML: 5 INJECTION INTRAVENOUS at 08:34

## 2024-05-01 RX ADMIN — RANOLAZINE 500 MG: 500 TABLET, EXTENDED RELEASE ORAL at 20:40

## 2024-05-01 RX ADMIN — LEVETIRACETAM 500 MG: 500 TABLET, FILM COATED ORAL at 08:34

## 2024-05-01 RX ADMIN — LEVETIRACETAM 500 MG: 500 TABLET, FILM COATED ORAL at 20:40

## 2024-05-01 RX ADMIN — SACUBITRIL AND VALSARTAN 1 TABLET: 49; 51 TABLET, FILM COATED ORAL at 08:34

## 2024-05-01 RX ADMIN — GUAIFENESIN 600 MG: 600 TABLET, EXTENDED RELEASE ORAL at 20:40

## 2024-05-01 RX ADMIN — RANOLAZINE 500 MG: 500 TABLET, EXTENDED RELEASE ORAL at 08:33

## 2024-05-01 RX ADMIN — HYDRALAZINE HYDROCHLORIDE 100 MG: 100 TABLET, FILM COATED ORAL at 08:34

## 2024-05-01 RX ADMIN — SERTRALINE HYDROCHLORIDE 50 MG: 50 TABLET ORAL at 08:33

## 2024-05-01 RX ADMIN — HYDRALAZINE HYDROCHLORIDE 100 MG: 100 TABLET, FILM COATED ORAL at 20:40

## 2024-05-01 RX ADMIN — SACUBITRIL AND VALSARTAN 1 TABLET: 49; 51 TABLET, FILM COATED ORAL at 20:42

## 2024-05-01 RX ADMIN — CARVEDILOL 25 MG: 25 TABLET, FILM COATED ORAL at 20:42

## 2024-05-01 RX ADMIN — GUAIFENESIN 600 MG: 600 TABLET, EXTENDED RELEASE ORAL at 08:34

## 2024-05-01 RX ADMIN — GENTAMICIN SULFATE: 1 CREAM TOPICAL at 08:33

## 2024-05-01 RX ADMIN — NITROGLYCERIN 10 MCG/MIN: 20 INJECTION INTRAVENOUS at 17:14

## 2024-05-01 ASSESSMENT — PAIN SCALES - GENERAL
PAINLEVEL_OUTOF10: 0
PAINLEVEL_OUTOF10: 2
PAINLEVEL_OUTOF10: 3

## 2024-05-01 ASSESSMENT — PAIN DESCRIPTION - ONSET: ONSET: AWAKENED FROM SLEEP

## 2024-05-01 ASSESSMENT — PAIN DESCRIPTION - DESCRIPTORS: DESCRIPTORS: TIGHTNESS

## 2024-05-01 ASSESSMENT — PAIN DESCRIPTION - LOCATION
LOCATION: CHEST
LOCATION: BACK;CHEST

## 2024-05-01 ASSESSMENT — PAIN DESCRIPTION - PAIN TYPE: TYPE: ACUTE PAIN

## 2024-05-01 NOTE — PLAN OF CARE
Problem: Discharge Planning  Goal: Discharge to home or other facility with appropriate resources  5/1/2024 1038 by Ginger Jones RN  Outcome: Progressing  Flowsheets (Taken 5/1/2024 1038)  Discharge to home or other facility with appropriate resources:   Identify barriers to discharge with patient and caregiver   Arrange for needed discharge resources and transportation as appropriate   Identify discharge learning needs (meds, wound care, etc)     Problem: Safety - Adult  Goal: Free from fall injury  5/1/2024 1038 by Ginger Jones RN  Outcome: Progressing  Flowsheets (Taken 5/1/2024 1038)  Free From Fall Injury:   Instruct family/caregiver on patient safety   Based on caregiver fall risk screen, instruct family/caregiver to ask for assistance with transferring infant if caregiver noted to have fall risk factors     Problem: Respiratory - Adult  Goal: Achieves optimal ventilation and oxygenation  5/1/2024 1038 by Ginger Jones RN  Outcome: Progressing  Flowsheets (Taken 5/1/2024 1038)  Achieves optimal ventilation and oxygenation:   Assess for changes in respiratory status   Assess for changes in mentation and behavior   Position to facilitate oxygenation and minimize respiratory effort   Oxygen supplementation based on oxygen saturation or arterial blood gases     Problem: Cardiovascular - Adult  Goal: Maintains optimal cardiac output and hemodynamic stability  5/1/2024 1038 by Ginger Jones RN  Outcome: Progressing  Flowsheets (Taken 5/1/2024 1038)  Maintains optimal cardiac output and hemodynamic stability:   Assess for signs of decreased cardiac output   Monitor urine output and notify Licensed Independent Practitioner for values outside of normal range   Monitor blood pressure and heart rate     Problem: Cardiovascular - Adult  Goal: Absence of cardiac dysrhythmias or at baseline  5/1/2024 1038 by Ginger Jones RN  Outcome: Progressing  Flowsheets (Taken 5/1/2024 1038)  Absence of cardiac  Structured MSE

## 2024-05-01 NOTE — PLAN OF CARE
Problem: Discharge Planning  Goal: Discharge to home or other facility with appropriate resources  Outcome: Progressing     Problem: Safety - Adult  Goal: Free from fall injury  Outcome: Progressing     Problem: Respiratory - Adult  Goal: Achieves optimal ventilation and oxygenation  Outcome: Progressing     Problem: Cardiovascular - Adult  Goal: Maintains optimal cardiac output and hemodynamic stability  Outcome: Progressing  Goal: Absence of cardiac dysrhythmias or at baseline  Outcome: Progressing     Problem: Skin/Tissue Integrity - Adult  Goal: Skin integrity remains intact  Outcome: Progressing  Goal: Incisions, wounds, or drain sites healing without S/S of infection  Outcome: Progressing  Goal: Oral mucous membranes remain intact  Outcome: Progressing     Problem: Infection - Adult  Goal: Absence of infection at discharge  Outcome: Progressing  Goal: Absence of infection during hospitalization  Outcome: Progressing     Problem: Metabolic/Fluid and Electrolytes - Adult  Goal: Electrolytes maintained within normal limits  Outcome: Progressing  Goal: Hemodynamic stability and optimal renal function maintained  Outcome: Progressing  Goal: Glucose maintained within prescribed range  Outcome: Progressing     Problem: Hematologic - Adult  Goal: Maintains hematologic stability  Outcome: Progressing     Problem: Neurosensory - Adult  Goal: Absence of seizures  Outcome: Progressing     Problem: Pain  Goal: Verbalizes/displays adequate comfort level or baseline comfort level  Outcome: Progressing     Problem: Chronic Conditions and Co-morbidities  Goal: Patient's chronic conditions and co-morbidity symptoms are monitored and maintained or improved  Outcome: Progressing

## 2024-05-01 NOTE — OP NOTE
[facility] Operative Note     PROCEDURE SUMMARY   [facility]   Indication NSTEMI   Consent Obtained   Pre-Sedation Pre-sedation note completed.  Immediately prior to procedure, patient was reassessed and pre-sedation assessment and strategy remained unchanged.    Post-Sedation After procedure, I personally supervised the patient until awake, alert, following commands, breathing independently and hemodynamically stable.   Access RCFA   US US guidance used to determine artery patency, size (>2mm), anatomic variations and ideal puncture location.  Real-time US utilized concurrent with vascular needle entry into artery.  Image(s) permanently recorded and reported in chart.   Bleed Risk Low   Sedation Minimal conscious sedation for comfort.  Independent trained observer pushed medications at my direction. Level of consciousness and vital signs/physiologic status monitored throughout the procedure (see start and stop times above, as well as medication dosages).   Start Time 1609   Stop Time 1716   Versed 2mg   Fentanyl 100mcg   Contrast 90cc   Flouro 14.4min   EBL <20mL   Complicat None   Specimens None   Diagnostic Detail Patient to cath lab in postabsorptive state, informed consent obtained.  RCFA prepped and draped in normal sterile fashion  18G needle used to access artery Without US guidance  J wire advanced into artery, 6Fsheath advanced over wire   JL 3.5 advanced over wire to engage LM coronary artery and image in multiple views  JR4 advanced over wire to engage RCA and image in multiple views  Pigtail advancd over wire into LV and LVEDP obtained, pullback gradient obtained  Arterial hemostasis obtained with Manual pressure   Intervention  Detail AP/AC regimen was Aspirin (ASA), Clopidogrel (Plavix) and Heparin  Guide catheter advanced over wire into SVG  Coronary Guidewire advanced to distal artery without difficulty  DFR was not performed due to lesin severity >/= 90%  FFR

## 2024-05-01 NOTE — CARE COORDINATION
Patient off floor for procedure. Discussed case with nurse Miles.  Chart reviewed patient goes to dr Mcknight for podiatry.  Will place wound care orders and see patient tomorrow morning. YUNG DORADON, RN, [facility]  [facility]  Wound/Ostomy Care  [phone]

## 2024-05-01 NOTE — PRE SEDATION
potential amnesia.  Patient to meet pre-procedure discharge plan.   Medications Midazolam intravenously and fentanyl intravenously   Appropriate Candidate Yes   Post Procedure Return to same level of care

## 2024-05-01 NOTE — DIALYSIS
Treatment initiated without complications or complaints from patient. Patient resting comfortably with VSS upon dialysis RN exit from room. Elana Verdugo RN notified of start of treatment and hotline number located on top of machine for any questions about troubleshooting during after hours.

## 2024-05-02 VITALS
TEMPERATURE: 97.8 F | BODY MASS INDEX: 29.57 KG/M2 | WEIGHT: 195.11 LBS | RESPIRATION RATE: 12 BRPM | SYSTOLIC BLOOD PRESSURE: 125 MMHG | HEART RATE: 97 BPM | OXYGEN SATURATION: 100 % | HEIGHT: 68 IN | DIASTOLIC BLOOD PRESSURE: 69 MMHG

## 2024-05-02 LAB
ALBUMIN SERPL-MCNC: 3.8 G/DL (ref 3.4–5)
ALBUMIN/GLOB SERPL: 1 {RATIO} (ref 1.1–2.2)
ALP SERPL-CCNC: 79 U/L (ref 40–129)
ALT SERPL-CCNC: 10 U/L (ref 10–40)
ANION GAP SERPL CALCULATED.3IONS-SCNC: 19 MMOL/L (ref 3–16)
ANION GAP SERPL CALCULATED.3IONS-SCNC: 21 MMOL/L (ref 3–16)
AST SERPL-CCNC: 14 U/L (ref 15–37)
BASOPHILS # BLD: 0 K/UL (ref 0–0.2)
BASOPHILS NFR BLD: 0.3 %
BILIRUB SERPL-MCNC: 0.3 MG/DL (ref 0–1)
BUN SERPL-MCNC: 66 MG/DL (ref 7–20)
BUN SERPL-MCNC: 69 MG/DL (ref 7–20)
CALCIUM SERPL-MCNC: 8.7 MG/DL (ref 8.3–10.6)
CALCIUM SERPL-MCNC: 8.7 MG/DL (ref 8.3–10.6)
CHLORIDE SERPL-SCNC: 90 MMOL/L (ref 99–110)
CHLORIDE SERPL-SCNC: 95 MMOL/L (ref 99–110)
CO2 SERPL-SCNC: 18 MMOL/L (ref 21–32)
CO2 SERPL-SCNC: 22 MMOL/L (ref 21–32)
CREAT SERPL-MCNC: 9.1 MG/DL (ref 0.9–1.3)
CREAT SERPL-MCNC: 9.9 MG/DL (ref 0.9–1.3)
DEPRECATED RDW RBC AUTO: 16.5 % (ref 12.4–15.4)
EOSINOPHIL # BLD: 0 K/UL (ref 0–0.6)
EOSINOPHIL NFR BLD: 0.1 %
GFR SERPLBLD CREATININE-BSD FMLA CKD-EPI: 6 ML/MIN/{1.73_M2}
GFR SERPLBLD CREATININE-BSD FMLA CKD-EPI: 6 ML/MIN/{1.73_M2}
GLUCOSE BLD-MCNC: 184 MG/DL (ref 70–99)
GLUCOSE BLD-MCNC: 205 MG/DL (ref 70–99)
GLUCOSE BLD-MCNC: 343 MG/DL (ref 70–99)
GLUCOSE BLD-MCNC: 405 MG/DL (ref 70–99)
GLUCOSE SERPL-MCNC: 140 MG/DL (ref 70–99)
GLUCOSE SERPL-MCNC: 395 MG/DL (ref 70–99)
HCT VFR BLD AUTO: 40.8 % (ref 40.5–52.5)
HGB BLD-MCNC: 12.9 G/DL (ref 13.5–17.5)
LYMPHOCYTES # BLD: 0.5 K/UL (ref 1–5.1)
LYMPHOCYTES NFR BLD: 6.2 %
MAGNESIUM SERPL-MCNC: 2 MG/DL (ref 1.8–2.4)
MCH RBC QN AUTO: 27.7 PG (ref 26–34)
MCHC RBC AUTO-ENTMCNC: 31.6 G/DL (ref 31–36)
MCV RBC AUTO: 87.7 FL (ref 80–100)
MONOCYTES # BLD: 0.1 K/UL (ref 0–1.3)
MONOCYTES NFR BLD: 1 %
NEUTROPHILS # BLD: 6.8 K/UL (ref 1.7–7.7)
NEUTROPHILS NFR BLD: 92.4 %
PERFORMED ON: ABNORMAL
PLATELET # BLD AUTO: 283 K/UL (ref 135–450)
PMV BLD AUTO: 9.7 FL (ref 5–10.5)
POTASSIUM SERPL-SCNC: 4.5 MMOL/L (ref 3.5–5.1)
POTASSIUM SERPL-SCNC: 4.5 MMOL/L (ref 3.5–5.1)
PROT SERPL-MCNC: 7.5 G/DL (ref 6.4–8.2)
RBC # BLD AUTO: 4.65 M/UL (ref 4.2–5.9)
REASON FOR REJECTION: NORMAL
REJECTED TEST: NORMAL
SODIUM SERPL-SCNC: 131 MMOL/L (ref 136–145)
SODIUM SERPL-SCNC: 134 MMOL/L (ref 136–145)
WBC # BLD AUTO: 7.4 K/UL (ref 4–11)

## 2024-05-02 PROCEDURE — 6370000000 HC RX 637 (ALT 250 FOR IP): Performed by: STUDENT IN AN ORGANIZED HEALTH CARE EDUCATION/TRAINING PROGRAM

## 2024-05-02 PROCEDURE — 99232 SBSQ HOSP IP/OBS MODERATE 35: CPT | Performed by: INTERNAL MEDICINE

## 2024-05-02 PROCEDURE — 90945 DIALYSIS ONE EVALUATION: CPT

## 2024-05-02 PROCEDURE — 6370000000 HC RX 637 (ALT 250 FOR IP): Performed by: INTERNAL MEDICINE

## 2024-05-02 PROCEDURE — 85025 COMPLETE CBC W/AUTO DIFF WBC: CPT

## 2024-05-02 PROCEDURE — 80053 COMPREHEN METABOLIC PANEL: CPT

## 2024-05-02 PROCEDURE — 6370000000 HC RX 637 (ALT 250 FOR IP): Performed by: HOSPITALIST

## 2024-05-02 PROCEDURE — 83735 ASSAY OF MAGNESIUM: CPT

## 2024-05-02 PROCEDURE — 36415 COLL VENOUS BLD VENIPUNCTURE: CPT

## 2024-05-02 PROCEDURE — 2580000003 HC RX 258: Performed by: HOSPITALIST

## 2024-05-02 RX ORDER — INSULIN LISPRO 100 [IU]/ML
0-4 INJECTION, SOLUTION INTRAVENOUS; SUBCUTANEOUS NIGHTLY
Status: DISCONTINUED | OUTPATIENT
Start: 2024-05-02 | End: 2024-05-02 | Stop reason: HOSPADM

## 2024-05-02 RX ORDER — INSULIN LISPRO 100 [IU]/ML
0-8 INJECTION, SOLUTION INTRAVENOUS; SUBCUTANEOUS
Status: DISCONTINUED | OUTPATIENT
Start: 2024-05-02 | End: 2024-05-02 | Stop reason: HOSPADM

## 2024-05-02 RX ORDER — ROSUVASTATIN CALCIUM 40 MG/1
40 TABLET, COATED ORAL EVERY EVENING
Qty: 90 TABLET | Refills: 0 | Status: SHIPPED | OUTPATIENT
Start: 2024-05-02 | End: 2024-07-31

## 2024-05-02 RX ORDER — INSULIN LISPRO 100 [IU]/ML
0-4 INJECTION, SOLUTION INTRAVENOUS; SUBCUTANEOUS NIGHTLY
Status: DISCONTINUED | OUTPATIENT
Start: 2024-05-02 | End: 2024-05-02

## 2024-05-02 RX ORDER — INSULIN GLARGINE 100 [IU]/ML
15 INJECTION, SOLUTION SUBCUTANEOUS NIGHTLY
Status: DISCONTINUED | OUTPATIENT
Start: 2024-05-02 | End: 2024-05-02 | Stop reason: HOSPADM

## 2024-05-02 RX ORDER — INSULIN LISPRO 100 [IU]/ML
0-4 INJECTION, SOLUTION INTRAVENOUS; SUBCUTANEOUS
Status: DISCONTINUED | OUTPATIENT
Start: 2024-05-02 | End: 2024-05-02

## 2024-05-02 RX ADMIN — ISOSORBIDE MONONITRATE 30 MG: 30 TABLET, EXTENDED RELEASE ORAL at 08:48

## 2024-05-02 RX ADMIN — GENTAMICIN SULFATE: 1 CREAM TOPICAL at 08:54

## 2024-05-02 RX ADMIN — CARVEDILOL 25 MG: 25 TABLET, FILM COATED ORAL at 08:48

## 2024-05-02 RX ADMIN — INSULIN LISPRO 8 UNITS: 100 INJECTION, SOLUTION INTRAVENOUS; SUBCUTANEOUS at 11:32

## 2024-05-02 RX ADMIN — CLOPIDOGREL BISULFATE 75 MG: 75 TABLET ORAL at 08:48

## 2024-05-02 RX ADMIN — ASPIRIN 81 MG: 81 TABLET, COATED ORAL at 08:48

## 2024-05-02 RX ADMIN — SODIUM CHLORIDE, PRESERVATIVE FREE 10 ML: 5 INJECTION INTRAVENOUS at 08:50

## 2024-05-02 RX ADMIN — FUROSEMIDE 40 MG: 40 TABLET ORAL at 08:48

## 2024-05-02 RX ADMIN — SACUBITRIL AND VALSARTAN 1 TABLET: 49; 51 TABLET, FILM COATED ORAL at 08:48

## 2024-05-02 RX ADMIN — HYDRALAZINE HYDROCHLORIDE 100 MG: 100 TABLET, FILM COATED ORAL at 08:48

## 2024-05-02 RX ADMIN — RANOLAZINE 500 MG: 500 TABLET, EXTENDED RELEASE ORAL at 08:48

## 2024-05-02 RX ADMIN — SERTRALINE HYDROCHLORIDE 50 MG: 50 TABLET ORAL at 08:48

## 2024-05-02 RX ADMIN — GUAIFENESIN 600 MG: 600 TABLET, EXTENDED RELEASE ORAL at 08:48

## 2024-05-02 RX ADMIN — LEVETIRACETAM 500 MG: 500 TABLET, FILM COATED ORAL at 08:48

## 2024-05-02 ASSESSMENT — PAIN SCALES - GENERAL
PAINLEVEL_OUTOF10: 0
PAINLEVEL_OUTOF10: 0

## 2024-05-02 NOTE — DISCHARGE SUMMARY
TABLETS IN 15 MINUTES  What changed:   how much to take  how to take this  when to take this  reasons to take this  Notes to patient: Used: to treat chest pain  Side effects: Headache, low blood pressure      May take one tablet every 15 minutes time 3, call ambulance if if chest pain continues      ranolazine 500 MG extended release tablet  Commonly known as: RANEXA  TAKE 1 TABLET BY MOUTH TWICE DAILY  What changed:   how much to take  how to take this  when to take this  Notes to patient: Use: to treat a type of long-term chest pain (stable angina) in some people  Side effects: upset stomach, constipation, dizziness, headache     rosuvastatin 40 MG tablet  Commonly known as: CRESTOR  Take 1 tablet by mouth every evening  What changed: when to take this  Notes to patient: Use: to lower your cholesterol   Side effects: Muscle weakness, fatigue             CONTINUE taking these medications      aspirin 81 MG EC tablet  Take 1 tablet by mouth daily     carvedilol 25 MG tablet  Commonly known as: COREG  Take 1 tablet by mouth 2 times daily     clopidogrel 75 MG tablet  Commonly known as: PLAVIX  Take 1 tablet by mouth daily     Dexcom G6 Sensor Misc  Change sensor every 10 days     Dexcom G6 Transmitter Misc  Change every 90 days     Entresto 49-51 MG per tablet  Generic drug: sacubitril-valsartan  TAKE 1 TABLET BY MOUTH TWICE  DAILY     furosemide 40 MG tablet  Commonly known as: LASIX  Take 1 tablet by mouth in the morning and 1 tablet in the evening.     hydrALAZINE 100 MG tablet  Commonly known as: APRESOLINE     isosorbide mononitrate 30 MG extended release tablet  Commonly known as: IMDUR  Take 1 tablet by mouth daily     levETIRAcetam 500 MG tablet  Commonly known as: KEPPRA  Take 1 tablet by mouth 2 times daily     melatonin 3 MG Tabs tablet     sertraline 50 MG tablet  Commonly known as: ZOLOFT  Take 1 tablet by mouth daily     vitamin D 1.25 MG (61703 UT) Caps capsule  Commonly known as: ERGOCALCIFEROL

## 2024-05-02 NOTE — DISCHARGE INSTRUCTIONS
Please call and make an appointment with your PCP within 1 week  Please call and make an appointment with Cardiology  Please take all your medications as prescribed including any new ones on discharge    Diabetic foot wound. Wound care orders while admitted: cleanse with soap and water, rinse and dry. Apply xeroform to wound cover with dry dressing.  Please follow up with your podiatrist, Dr David Lowe. Please resume wound care orders as given by Dr Lowe.   If you cannot contact Dr Lowe. Please consider going to the wound center at [facility].  [facility]  [address]  [place] [address]  Tel: [phone]  Hours:   Wednesday 8AM-4PM  Thursday 8AM-4PM  Friday 8AM-1PM  Saturday Closed  Sunday Closed  Monday 8AM-4PM  Tuesday 8AM-4PM

## 2024-05-02 NOTE — DISCHARGE INSTR - COC
Continuity of Care Form    Patient Name: Te Garay   :  1965  MRN:  [id number]    Admit date:  2024  Discharge date:  ***    Code Status Order: Full Code   Advance Directives:     Admitting Physician:  Te Fuentes DO  PCP: Reid Lei MD    Discharging Nurse: ***  Discharging Hospital Unit/Room#: C5L-2082/5907-01  Discharging Unit Phone Number: ***    Emergency Contact:   Extended Emergency Contact Information  Primary Emergency Contact: Sachi Garay  Address: [address] [place] [address]  Home Phone: [phone]  Mobile Phone: [phone]  Relation: Spouse  Secondary Emergency Contact: Kendell Rodriguez  Home Phone: [phone]  Relation: Child    Past Surgical History:  Past Surgical History:   Procedure Laterality Date    BLADDER SURGERY Left 2023    CYSTOSCOPY, LEFT URETEROSCOPY, STONE BASKET MANIPULATION, PLACEMENT OF LEFT URETERAL STENT performed by Chevy Sepulveda MD at [facility] OR    CARDIAC CATHETERIZATION      CARDIAC SURGERY      triple bypass at     CAROTID STENT PLACEMENT Right     CORONARY ARTERY BYPASS GRAFT      FOOT DEBRIDEMENT Left 10/16/2023    LEFT FOOT DEBRIDEMENT INCISION AND DRAINAGE WITH BONE BIOPSY performed by David Lowe DPM at [facility] OR    FOOT DEBRIDEMENT Left 10/23/2023    LEFT FOOT INCISION AND DRAINAGE WITH DELAYED PRIMARY CLOSURE AND DERMAL GRAFT APPLICATION performed by David Lowe DPM at [facility] OR    HERNIA REPAIR      infant    LAPAROSCOPY INSERTION PERITONEAL CATHETER N/A 2020    LAPAROSCOPIC PLACEMENT OF PERITONEAL DIALYSIS  CATHETER performed by Tristen Palacios MD at [facility] OR    TONSILLECTOMY         Immunization History:   Immunization History   Administered Date(s) Administered    DT (pediatric) 1999    Influenza Vaccine, unspecified formulation 2012    Influenza Virus Vaccine 2009, 2012    Influenza, FLUARIX, FLULAVAL, FLUZONE (age 6 mo+) AND

## 2024-05-02 NOTE — CARE COORDINATION
[facility] Ostomy Continence Nurse  Follow-up Progress Note       NAME:  Te Garay  MEDICAL RECORD NUMBER:  [id number]  AGE:  59 y.o.   GENDER:  male  :  1965  TODAY'S DATE:  2024    Subjective:   Wound Identification:  Wound Type: diabetic  Contributing Factors: diabetes and Renal disease        Patient Goal of Care:  [x] Wound Healing  [] Odor Control  [] Palliative Care  [] Pain Control   [] Other:     Objective:    /69   Pulse 97   Temp 97.8 °F (36.6 °C) (Temporal)   Resp 12   Ht 1.727 m (5' 8\")   Wt 88.5 kg (195 lb 1.7 oz)   SpO2 100%   BMI 29.67 kg/m²   Paul Risk Score: Paul Scale Score: 21  Assessment:   Measurements:  Wound 23 Foot Left;Lateral (Active)   Dressing Status New dressing applied;Dry;Clean;Intact 24 1145   Wound Cleansed Cleansed with saline 24 1627   Dressing/Treatment Foam 24 1145   Offloading for Diabetic Foot Ulcers Post op shoe 24 1735   Wound Assessment Eschar dry 24 0742   Drainage Amount Scant (moist but unmeasurable) 24 1145   Drainage Description Clear 24 1145   Odor None 24 1145   Katherine-wound Assessment Warm 24 1627   Number of days: 259   Patient seen for wound to left lateral foot.  Patient agreeable to visit.  Patient admitted for chest pain on .  Patient goes to Dr David Lowe for podiatry.  Wound is healing, covered with white tissue, katherine wound is intact skin. Wound is 1.3 cm high, and 1 cm wide.  Patient last seen  by podiatry resident.  Patient reports he has been using an antibacterial ointment to wound and a dry dressing.  Patient verbalized that he will follow up with Dr Lowe after discharge.  Patient pending discharge today. Patient to resume wound care as instructed by Dr Lowe.  While here wound care orders included applying xeroform to the wound and covering with a foam dressing.  Patient to continue using post op shoe as prescribed

## 2024-05-02 NOTE — PROGRESS NOTES
Admit Date: 4/29/2024  Diet: ADULT DIET; Regular; 3 carb choices (45 gm/meal); Low Fat/Low Chol/High Fiber/2 gm Na; Low Potassium (Less than 3000 mg/day); 1800 ml    CC: Chest pain    Interval history:   Overnight, there were no acute events. Patient's vitals remained stable    Patient was seen this morning in bed.  He endorses that his angina has been getting worse of late.  He claims that at first the Ranexa used to work very well but now it is not working as well.  He endorses that he did have an episode of angina that resolved overnight. Patient denies fevers, chills, nausea, vomiting, shortness of breath, diarrhea, constipation, dysuria, urinary frequency or urgency.     Plan:     -Continue Ranexa + Imdur  -Will await any further recs from cardiology regarding recurrent angina    Assessment:   Te Garay is a 59 y.o. male with PMH of poorly controlled DM1 uses insulin pump, has full sequela including nephropathy ESRD on PD, polyneuropathy with history of LLE debridement, CAD status post CABG, HFrEF, seizure disorder, mood disorder  who was admitted with chest pain    Chest pain  Patient endorsed that he does have baseline angina but has found that it has been getting worse of late.  He claims that in the past, the Ranexa controlled it well but nowadays it is not working as well.  Patient has had . Recurrent nightly chest pain, correlated with elevated blood pressure negative treadmill stress test in 10/2023 showed moderate size defect in inferolateral wall with minimal reversibility  -Cardiology consulted, appreciate recs    ESRD on PD  Patient follows with nephrology in the outpatient setting. He is on PD at home.  -Nephrology consulted, appreciate recs    T1DM -uncontrolled  Patient's last HgbA1c was 9.1% on 4/17/2024. Patient is on an insulin pump.    CAD  Patient is s/p CABG. Last echo with EF 35%. At home, patient is on Coreg, Entresto, oral Lasix, Imdur, Ranexa, aspirin,      HLD  At home, 
      Admit Date: 4/29/2024  Diet: Diet NPO Exceptions are: Sips of Water with Meds    CC: Chest pain    Interval history:   Overnight, there were no acute events. Patient's vitals remained stable    Patient was seen this morning sitting up in a chair.  He endorses he had a mild episode of angina last night.  He claims that he is currently waiting to be taken to the Cath Lab for further evaluation of his recurrent angina.  He does not have any new complaints today.  Patient denies fevers, chills, nausea, vomiting, shortness of breath, diarrhea, constipation, dysuria, urinary frequency or urgency.     Plan:     -Left heart cath today with cardiology    Assessment:   Te Garay is a 59 y.o. male with PMH of poorly controlled DM1 uses insulin pump, has full sequela including nephropathy ESRD on PD, polyneuropathy with history of LLE debridement, CAD status post CABG, HFrEF, seizure disorder, mood disorder  who was admitted with chest pain    Chest pain  Patient endorsed that he does have baseline angina but has found that it has been getting worse of late.  He claims that in the past, the Ranexa controlled it well but nowadays it is not working as well.  Patient has had . Recurrent nightly chest pain, correlated with elevated blood pressure negative treadmill stress test in 10/2023 showed moderate size defect in inferolateral wall with minimal reversibility  -Cardiology consulted, appreciate recs    ESRD on PD  Patient follows with nephrology in the outpatient setting. He is on PD at home.  -Nephrology consulted, appreciate recs    T1DM - uncontrolled  Patient's last HgbA1c was 9.1% on 4/17/2024. Patient is on an insulin pump.    CAD  Patient is s/p CABG. Last echo with EF 35%. At home, patient is on Coreg, Entresto, oral Lasix, Imdur, Ranexa, aspirin,      HLD  At home, patient is on rosuvastatin + ezetimibe     Seizure disorder and mood disorder  At home, patient is on Keppra and Zoloft     Code Status: Full Code 
  [facility]  H+P  Consult  OP Visit  FU Visit   CC/INTERVAL HX   Admit 4/29/2024   CC CAD, HTN, CHOL   Subjective Doing well.  No cp.      Tele Reviewed available   EXAM   VS BP (!) 145/86   Pulse 94   Temp 97.5 °F (36.4 °C) (Temporal)   Resp 16   Ht 1.727 m (5' 8\")   Wt 88.5 kg (195 lb 1.7 oz)   SpO2 98%   BMI 29.67 kg/m²    Gen Alert, coop, no distress Heart  RRR, no MRG   Head NC, AT, no abnorm Abd  Soft, NT, +BS, no mass, no OM   Eyes PER, conj/corn clear Ext  Ext nl, AT, no C/C/E   Nose Nares nl, no drain, NT Pulse 2+ and symmetric   Throat Lips, mucosa, tongue nl Skin Col/text/turg nl, no vis rash/les   Neck S/S, TM, NT, no bruit/JVD Psych Nl mood and affect   Lung CTA-B, unlabored, no DTP Lymph   No cervical or axillary LA   Ch wall NT, no deform Neuro  Nl gross M/S exam      MEDICATIONS   Relevant cardiac medications Reviewed in EPIC   LABS   Hgb Lab Results   Component Value Date    HGB 12.9 (L) 05/02/2024      Cr Lab Results   Component Value Date    CREATININE 9.1 (HH) 05/02/2024      Trop Lab Results   Component Value Date    CKTOTAL 71 11/22/2023    CKMB 1.92 05/26/2010    CKMBINDEX 1.8 05/26/2010    TROPHS 157 (H) 04/29/2024    TROPHS 161 (H) 04/29/2024    TROPHS 91 (H) 04/17/2024       ASSESSMENT TIME   More than 35 minutes spent reviewing patient chart (including but not limited to notes, labs, imaging and other testing), interviewing patient and/or family members, performing a physical exam, documentation of my findings above and subsequent follow-up of ordered testing.  More than 50% of that time spent face to face with patient discussing clinical condition and counseling regarding treatment plan.     ASSESSMENT AND PLAN   *CAD/CM    Date EF Results   Sx     No concerning   Hx 2001   S/p CABG   LHC 4/21 4/24   PCI of S-O ([facility])  PCI of S-O ([facility])   MPI 10/23   Abnormal perfusion, fixed defect with minimal reversibility    TTE 7/19 12/21 6/23 40%  40%  35%     Plan     
  CCPD Order   Exchanges: 4   Exchange Volume: 2500 ml   Total Time: 10 hrs   Dextrose: 2.5%   Total Volume: 10,000 ml     Orders verified. Supplies taken to pt's room. Report received. Cycler set up, primed and pre tested. [facility] Pt connected to cycler. CCPD initiated without problem. Initial effluent clear.       If problems should arise please call the [phone] on top of PD cycler machine.       
  Nephrology progress Note  [phone]  [phone]   [email or web address]Bill Me Later       Patient:  Te Garay   : 1965    Brief HPI    The patient is a 59 y.o.male with significant past medical history of ESRD, CAD/CABG, DMI, diabetic neuropathy, diabetic gastroparesis, diabetic foot wound right, carotid artery stenosis s/p carotid artery stent placement, CHF diastolic, hyperlipidemia, PVD, seizures came in with swelling in the legs.  Recent hospitalization for multifocal pneumonia and DKA.  Also reported chest pains especially at night  Chest x-ray on admission with no acute changes  Venous Doppler of the lower extremities with no evidence of DVT  VQ scan with low probability for PE  We are consulted for ESRD management  He has been on CCPD  Missed his dialysis last night because of the hospitalization    Subjective/interval history  Patient seen and examined  Reviewed notes from dialysis RN  Leg edema better  No chest pain or shortness of breath  Plans for [facility] today      Meds:  Scheduled Meds:   Insulin Pump - Bolus Dose   SubCUTAneous 4x Daily AC & HS    Insulin Pump - Basal Dose   SubCUTAneous Daily    ranolazine  500 mg Oral BID    gentamicin   Topical Daily    aspirin  81 mg Oral Daily    carvedilol  25 mg Oral BID    clopidogrel  75 mg Oral Daily    sacubitril-valsartan  1 tablet Oral BID    furosemide  40 mg Oral BID    guaiFENesin  600 mg Oral BID    hydrALAZINE  100 mg Oral TID    isosorbide mononitrate  30 mg Oral Daily    levETIRAcetam  500 mg Oral BID    sertraline  50 mg Oral Daily    [START ON 2024] vitamin D  50,000 Units Oral Weekly    sodium chloride flush  5-40 mL IntraVENous 2 times per day     Continuous Infusions:   dextrose      sodium chloride       PRN Meds:.dextrose bolus **OR** dextrose bolus, glucagon (rDNA), dextrose, melatonin, sodium chloride flush, sodium chloride, ondansetron **OR** ondansetron, acetaminophen **OR** acetaminophen, polyethylene glycol, perflutren lipid microspheres, 
ACT drawn and resulted 157.  Patient instructed on removal procedure.   Arterial sheath site without hematoma or oozing. Arterial sheath removed per policy without difficulty. Integrity of sheath inspected upon removal and no abnormalities noted.  Manual pressure held X 20 minutes.  Dry, sterile tegaderm applied.  Pressure dressing applied.  Patient tolerated well.  Vital signs, groin checks, and pedal pulses will be completed per protocol (every 15 minutes X 4, every 30 minutes X 2, and every hour X 2 per protocol).    Sheath removed by  2225.       
[facility]  Diabetes Education   Progress Note       NAME:  Te Garay  MEDICAL RECORD NUMBER:  [id number]  AGE: 59 y.o.   GENDER: male  : 1965  TODAY'S DATE:  2024    Subjective   Reason for Diabetes Education Evaluation and Assessment: Insulin pump patient, type 1 diabetic.    Visit Type: evaluation      Te Garay is a 59 y.o. male referred by:  [x] Physician  [] Nursing  [] Chart Review   [] Other:     PAST MEDICAL HISTORY        Diagnosis Date    Angina at rest ([facility])     Cardiomyopathy ([facility])     Carotid artery stenosis 10/25/2016    SUZANNA stented with 8 x 30 mm non-tapered Xact stent    CHF (congestive heart failure) ([facility]) 2015    EF 30%     CKD (chronic kidney disease) stage 2, GFR 60-89 ml/min     Coronary artery disease     sp CABG     Diabetic peripheral neuropathy ([facility])     Diastolic HF (heart failure) ([facility])     Hyperlipidemia with target LDL less than 70     Hypertension goal BP (blood pressure) < 130/80     PVD (peripheral vascular disease) ([facility])     Seizures ([facility])     in childhood    Type 1 diabetes mellitus with chronic kidney disease ([facility])     c-peptide <0.1 in        PAST SURGICAL HISTORY    Past Surgical History:   Procedure Laterality Date    BLADDER SURGERY Left 2023    CYSTOSCOPY, LEFT URETEROSCOPY, STONE BASKET MANIPULATION, PLACEMENT OF LEFT URETERAL STENT performed by Chevy Sepulveda MD at [facility] OR    CARDIAC CATHETERIZATION      CARDIAC SURGERY      triple bypass at     CAROTID STENT PLACEMENT Right     CORONARY ARTERY BYPASS GRAFT      FOOT DEBRIDEMENT Left 10/16/2023    LEFT FOOT DEBRIDEMENT INCISION AND DRAINAGE WITH BONE BIOPSY performed by David Lowe DPM at [facility] OR    FOOT DEBRIDEMENT Left 10/23/2023    LEFT FOOT INCISION AND DRAINAGE WITH DELAYED PRIMARY CLOSURE AND DERMAL GRAFT APPLICATION performed by David Lowe DPM at [facility] OR    HERNIA REPAIR      infant    LAPAROSCOPY INSERTION PERITONEAL 
Called report to 5C RN.  
Disconnected from CCPD per protocol.  Effluent: clear  Total time: 12 hr 18 min   Total UF:  1831 ml.  Total Volume:  9894 ml.  Dwell time gained:  0 hr 12 min.  Pt Tolerated procedure: well      
Disconnected from CCPD per protocol.  Effluent: clear  Total time: 17 hr 10 min   Total UF:  1573 ml.  Total Volume:  7493 ml.  Dwell time lost:  1 hr 14 min.  Pt Tolerated procedure: Pt had slow drain alarms so time was extended on cycler. Pt states this occurs sometimes and he can usually resolve it by repositioning and/or closing and re-opening catheter. Pt states last BM was yesterday. Pt requested to have stat drain and not finish fourth cycle per prescription. Pt disconnected per aseptic technique and PD catheter secured with tape.       
Educated patient and/or family on the importance of attending Cardiac Rehab post procedure. Educational flyer provided. Cardiology RN notified to place outpatient orders.   
Informed by charge RN that pt will be transferred to CVU. Report given to CVU RN. All patient belongings packed and taken to his new room by cath lab nurses.   
Notifed Dr Rebecca Benítez of  of 405 gave 8u per sliding scale.  
PATIENT HISTORY    ECHO: DATE: 04/30/24       EF:  30-35%  STRESS TEST PREFORMED:  Yes FINDINGS:  Stress Nuclear: Date:  10/19/2023  Result:  Positive: Intermediate     EF: 46%  EKG: Yes    ECG     Result: Normal  Pre CATH Rhythm: Normal Sinus Rhythm  HYPERTENSION: Yes  DYSLIPIDEMIA: Yes  FAMILY HX OF CAD: Yes  PRIOR MI: No  PRIOR PCI: Yes.  Most recent date: 04/9/2021  PRIOR CABG: Yes.  Most recent date: 2001  CEREBROVASCULAR DX: No  PERIPHERAL ARTERIAL DISEASE: No  CHRONIC LUNG DISEASE: No  TOBACCO: Never  DIABETIC: Yes, Insulin Treatment  CARDIAC ARREST: {No  DIALYSIS: Yes  HEART FAILURE: Yes     NYHA Class: Class II     Newly Diagnosed: No     HF Type: Unknown  FRAILTY SCORE: 5 MILDLY FRAIL (more evident slowing, need help with IADLs finances, heavy housework, transport, meds)  CARDIAC CTA PREFORMED:  No  [place] CORONARY CALCIUM SCORE:   Assessed: No  Prior Diagnostic Coronary Angioplasty Procedure:  Yes    Most Recent Date: 04/09/2021    Results: Obstructive  
Patient . Will continue to provide care.   
Patient BP at 126/70. Stopped the nitro infusion. Will continue to provide care.   
Pharmacy Home Medication Reconciliation Note    A medication reconciliation has been completed for Te Garay 1965    Pharmacy: [facility] [address], [place], [place]  Information provided by: patient    The patient's home medication list is as follows:  No current facility-administered medications on file prior to encounter.     Current Outpatient Medications on File Prior to Encounter   Medication Sig Dispense Refill    vitamin D (ERGOCALCIFEROL) 1.25 MG (52131 UT) CAPS capsule Take 1 capsule by mouth once a week Saturdays.      melatonin 3 MG TABS tablet Take 2 tablets by mouth nightly as needed (Insomnia/sleep aid.)      hydrALAZINE (APRESOLINE) 100 MG tablet Take 1 tablet by mouth 3 times daily      Continuous Blood Gluc Transmit (DEXCOM G6 TRANSMITTER) MISC Change every 90 days 1 each 1    Continuous Blood Gluc Sensor (DEXCOM G6 SENSOR) MISC Change sensor every 10 days 9 each 3    levoFLOXacin (LEVAQUIN) 750 MG tablet Take 1 tablet by mouth daily for 7 days (Patient taking differently: Take 1 tablet by mouth daily Take one tablet by mouth for 7 days.) 7 tablet 0    lactobacillus (CULTURELLE) capsule Take 1 capsule by mouth 2 times daily (with meals) for 8 days (Patient taking differently: Take 1 capsule by mouth daily) 16 capsule 0    sodium bicarbonate 650 MG tablet Take 2 tablets by mouth 3 times daily (Patient not taking: Reported on 4/29/2024) 180 tablet 0    guaiFENesin (MUCINEX) 600 MG extended release tablet Take 1 tablet by mouth 2 times daily for 7 days 14 tablet 0    nitroGLYCERIN (NITROSTAT) 0.4 MG SL tablet DISSOLVE 1 TABLET UNDER TONGUE FOR CHEST PAIN- IF PAIN REMAINS AFTER 5 MINS, CALL 911 AND REPEAT DOSE MAX 3 TABLETS IN 15 MINUTES (Patient taking differently: Place 1 tablet under the tongue every 5 minutes as needed for Chest pain DISSOLVE 1 TABLET UNDER TONGUE FOR CHEST PAIN- IF PAIN REMAINS AFTER 5 MINS, CALL 911 AND REPEAT DOSE MAX 3 TABLETS IN 15 MINUTES) 25 tablet 2    
Pt admitted to CVU 2904 from cath lab. Upon arrival, patient has arterial sheath in right groin. Per cath lab, current . Insertion site noted with no oozing or drainage, no swelling, or pain. Pt Alert & oriented. Call light in reach. All needs met at this time.   
Pt c/o CP 3/10. Obtained ECG-NSR. HR-84. Vitals obtained: 136/72, 84, 18, 97.7, 98% RA. Will continue to monitor. Call light in reach.   
Pt had episode of chest tightness 2/10, non-radiating,  that awakened him from sleep. VSS. CP resolved after 2 minutes.  
the patient gave his RN Ginger a log with boluses his Tandem T-slim Insulin Pump gave him through out the night. Reviewed how to chart insulin pump boluses given by Tandem system via Insulin Pump. Charting correct. Sandy De La Rosa RN, BSN, [facility].    
   LHC, R/B/O discussed   *HTN  Status Advice Plan   Controlled Diet/salt/xcise/wt counseling Continue antihypertensives at current dosages   *CHOL  LDL Advice Plan   76, 23 Diet/salt/xcise/wt counseling Continue MT/HI statin   CKD  Status PD  Plan Per nephrology  
AM    CREATININE 9.1 05/02/2024 07:07 AM    CALCIUM 8.7 05/02/2024 07:07 AM    GFRAA 9 10/03/2022 04:45 AM    GFRAA 44 06/15/2013 01:00 AM    LABGLOM 6 05/02/2024 07:07 AM    LABGLOM 6 04/30/2024 04:51 AM    GLUCOSE 395 05/02/2024 07:07 AM     Ionized Calcium:  No results found for: \"IONCA\"  Magnesium:    Lab Results   Component Value Date/Time    MG 2.00 05/02/2024 07:07 AM     Phosphorus:    Lab Results   Component Value Date/Time    PHOS 3.0 04/18/2024 05:03 AM       Assessment/Plan:    ESRD  Has been on CCPD  Appears euvolemic at this time  Continue home prescription with cycler, can use varying concentration of dextrose based on his weight and symptoms as usual  Can be discharged today from my perspective     Hypertension controlled  Continue current regimen     Anemia in CKD  Hemoglobin at goal  No need of ESAs    NSTEMI  S/p LHC with PCI      Halima Neumann MD  5/2/2024  Office Phone : [phone]    Thank you for allowing us to participate in the care of this pt. I willcontinue to follow along. Please call with questions or concerns.

## 2024-05-02 NOTE — CARE COORDINATION
Chart reviewed for discharge planning. Nitro gtt off. Pt independent.     CM continues to follow for any discharge needs.    Radha Browning RN, BSN  [phone]

## 2024-05-03 LAB
EKG ATRIAL RATE: 100 BPM
EKG DIAGNOSIS: NORMAL
EKG P AXIS: 60 DEGREES
EKG P-R INTERVAL: 156 MS
EKG Q-T INTERVAL: 402 MS
EKG QRS DURATION: 102 MS
EKG QTC CALCULATION (BAZETT): 518 MS
EKG R AXIS: 60 DEGREES
EKG T AXIS: 9 DEGREES
EKG VENTRICULAR RATE: 100 BPM

## 2024-05-06 ENCOUNTER — TELEPHONE (OUTPATIENT)
Dept: ENDOCRINOLOGY | Age: 59
End: 2024-05-06

## 2024-05-06 RX ORDER — INSULIN ASPART INJECTION 100 [IU]/ML
INJECTION, SOLUTION SUBCUTANEOUS
Qty: 20 ML | Refills: 2 | Status: SHIPPED | OUTPATIENT
Start: 2024-05-06

## 2024-05-08 LAB
POC ACT LR: 273 SEC
POC ACT LR: 289 SEC

## 2024-05-09 ENCOUNTER — COMMUNITY CARE MANAGEMENT (OUTPATIENT)
Facility: CLINIC | Age: 59
End: 2024-05-09

## 2024-05-13 ENCOUNTER — TELEPHONE (OUTPATIENT)
Dept: CARDIOLOGY CLINIC | Age: 59
End: 2024-05-13

## 2024-05-13 NOTE — TELEPHONE ENCOUNTER
Pt states he has been having fluctuating BP and has adjusted his medications. States BP is now elevating in morning. Asking if NPTS can call to advise.

## 2024-05-13 NOTE — TELEPHONE ENCOUNTER
I spoke with pt and he stated that he was in the hospital his blood sugar was out of control and that was treated. Then went back because of more issue. He was then treated for pneumonia and BP meds were changed. Hydralazine was put back on the list.he cut those back to BID instead of TID. He will take the isosorbide opposite of the Hydralizine to keep the BP from bottoming out.

## 2024-05-14 NOTE — TELEPHONE ENCOUNTER
Last ov:24 DCE   Next ov:24 [facility]   Last EK24  Last labs:24  Last filled:   Disp Refills Start End    nitroGLYCERIN (NITROSTAT) 0.4 MG SL tablet 25 tablet 2 2023 --    Sig: DISSOLVE 1 TABLET UNDER TONGUE FOR CHEST PAIN- IF PAIN REMAINS AFTER 5 MINS, CALL 911 AND REPEAT DOSE MAX 3 TABLETS IN 15 MINUTES    Patient taking differently: Place 1 tablet under the tongue every 5 minutes as needed for Chest pain DISSOLVE 1 TABLET UNDER TONGUE FOR CHEST PAIN- IF PAIN REMAINS AFTER 5 MINS, CALL 911 AND REPEAT DOSE MAX 3 TABLETS IN 15 MINUTES    Sent to pharmacy as: Nitroglycerin 0.4 MG Sublingual Tablet Sublingual (NITROSTAT)    E-Prescribing Status: Receipt confirmed by pharmacy (2023  8:20 AM EST)

## 2024-05-14 NOTE — TELEPHONE ENCOUNTER
He's doing better today.    He's concerned with his BP control     At discharge from his last hospitalization his meds were twicked.    His BP started bottoming out again. He stopped hydralazine altogether.   His BP started progressively going up by the afternoon 185/88. He resumed hydralazine in the afternoon (3 or 4pm)    Eventually it went up to the 190/ by nighttime. He increased hydralazine to bid    This morning he dialyzed 1.3 L off. His BP before meds was 141/76  He took his meds : isosorbide / Entresto / carvedilol / lasix / plavix    [facility]

## 2024-05-15 RX ORDER — NITROGLYCERIN 0.4 MG/1
TABLET SUBLINGUAL
Qty: 25 TABLET | Refills: 2 | Status: SHIPPED | OUTPATIENT
Start: 2024-05-15

## 2024-05-15 NOTE — TELEPHONE ENCOUNTER
Pt called stating they are leaving out of town at 6 am 5/16 and would like to know if they can get their nitroGLYCERIN (NITROSTAT) 0.4 MG SL tablet today ?    Pls advise thank you

## 2024-05-16 ENCOUNTER — TELEPHONE (OUTPATIENT)
Dept: CARDIOLOGY CLINIC | Age: 59
End: 2024-05-16

## 2024-05-16 NOTE — TELEPHONE ENCOUNTER
Called and spoke with patient about symptoms. This pain is since his last C 5/1. Pt reports when he walks up the stairs or moves luggage he gets chest discomfort. He checks his blood pressure and it is in the 170s-180s systolic. His blood pressure is fine in the morning after his meds at 120s systolic and he does not experience the pain on exertion. He has been taking hydralazine mid afternoon and evening when his BP gets high and that helps significantly reduce the chest discomfort though not completely. His blood pressure comes down some with hydralazine  but is not back to 120s. Confirmed all BP meds. He has not missed any plavix. He does not take hydralazine 100mg in the morning with his imdur 30 because his blood pressure drops to 80s systolic.     Discussed with DCE. Per DCE increase imdur to 90mg in the morning. If chest pain does not improve, worsens or does not go away to go to ER. Pt agreeable and VU.     Will forward to [facility] as well

## 2024-05-16 NOTE — TELEPHONE ENCOUNTER
Pt is experiencing chest pain when he moves.  He has to stop what he is doing, and take deep breaths and it helps a lot.  Nitro helps, but if he exerts his self after nitro the pain comes right back.    He has taken 2-3 nitro's today, but [place]  Normally he does not take that much nitro.  For the last few days he has been taking Hydralazine at 3 pm - 4 pm and one later in the evening, and that has helped his night time chest pain.  The isosorbide does work for awhile after he takes it in the morning.    The pain usually comes around 5 pm - 6 pm, or if he is active.  When he is resting, he does not have an pain or anything discomfort in his chest.    He reports on one occasions walking up stairs then down stairs he was in a lot of pain and sweating profusely and when the pain subsided, the sweating subsided.  It happened on another time he was carrying a back pack and rolling luggage behind him, it was painful but not as bad as the last time.    He did take his BP, when having the pain it ranges from 178/92 - 189/90's    Please advise

## 2024-05-17 RX ORDER — ISOSORBIDE MONONITRATE 60 MG/1
90 TABLET, EXTENDED RELEASE ORAL DAILY
Qty: 45 TABLET | Refills: 1 | Status: SHIPPED | OUTPATIENT
Start: 2024-05-17

## 2024-05-17 NOTE — TELEPHONE ENCOUNTER
Take 90 mg of IMDUR in the morning  OK to continue Hydralazine in mid afternoon as he has been.     Dr Celestin agrees with changes made from yesterday. If Chest pain continues or worsens, go to ER.

## 2024-05-17 NOTE — TELEPHONE ENCOUNTER
Spoke to pt about message below he verbalized understanding.         Pt stated he need a new prescription for the IMDUR since he is being told to increase medication. Unfortunately pt is out of town and would need a RX called into the [facility]'s in [place], [place]. The number is .

## 2024-05-17 NOTE — TELEPHONE ENCOUNTER
Just spoke with Patient on two of his medications, Hydrazine and isosorbide that he needs clarification of which way and how many time per day he is to take them.  Even if someone spoke to the patient yesterday, he still needs guidance, and a call form the office concerning the matter.

## 2024-05-20 ENCOUNTER — TELEPHONE (OUTPATIENT)
Dept: CARDIOLOGY CLINIC | Age: 59
End: 2024-05-20

## 2024-05-20 RX ORDER — ISOSORBIDE MONONITRATE 60 MG/1
90 TABLET, EXTENDED RELEASE ORAL DAILY
Qty: 30 TABLET | Refills: 1 | Status: SHIPPED | OUTPATIENT
Start: 2024-05-20

## 2024-05-20 RX ORDER — ISOSORBIDE MONONITRATE 30 MG/1
30 TABLET, EXTENDED RELEASE ORAL DAILY
Qty: 30 TABLET | Refills: 1 | Status: SHIPPED | OUTPATIENT
Start: 2024-05-20

## 2024-05-20 NOTE — TELEPHONE ENCOUNTER
Pt called to inform DCE that the Pharmacist in [place] states they can not cut an extended release pill in half.  Per the Pharmacist the isosorbide mononitrate comes in doses of: 20.60 and 120.  They can not fill the script until DCE correct the quantity and how the Pt takes it each day. As it is now in the chart Pt takes 1.5 tablet of 60 mg.  Please advise.  Thank you

## 2024-05-20 NOTE — TELEPHONE ENCOUNTER
Spoke with MARY. Sent 30 day to [place] pharmacy for 60 mg tab and 30 mg tab. He will need to take one of each for the 90 mg dose. Please reach out to the pharmacy to let them know as well as the patient.

## 2024-05-20 NOTE — TELEPHONE ENCOUNTER
Pt called stating he is on vacation in [place], he wants to know if he can get a small tattoo due to his blood thinner.  Please call to discuss.  Thank you

## 2024-05-31 RX ORDER — ISOSORBIDE MONONITRATE 60 MG/1
90 TABLET, EXTENDED RELEASE ORAL DAILY
Qty: 135 TABLET | Refills: 3 | Status: SHIPPED | OUTPATIENT
Start: 2024-05-31

## 2024-06-04 RX ORDER — HYDRALAZINE HYDROCHLORIDE 50 MG/1
50 TABLET, FILM COATED ORAL 3 TIMES DAILY
Qty: 270 TABLET | Refills: 0 | Status: SHIPPED | OUTPATIENT
Start: 2024-06-04

## 2024-06-23 ENCOUNTER — HOSPITAL ENCOUNTER (INPATIENT)
Age: 59
LOS: 11 days | Discharge: HOME | End: 2024-07-04
Attending: EMERGENCY MEDICINE | Admitting: INTERNAL MEDICINE
Payer: MEDICARE

## 2024-06-23 ENCOUNTER — APPOINTMENT (OUTPATIENT)
Dept: GENERAL RADIOLOGY | Age: 59
End: 2024-06-23
Payer: MEDICARE

## 2024-06-23 DIAGNOSIS — R07.9 CHEST PAIN, UNSPECIFIED TYPE: Primary | ICD-10-CM

## 2024-06-23 DIAGNOSIS — M86.9 TOE OSTEOMYELITIS, LEFT (HCC): ICD-10-CM

## 2024-06-23 DIAGNOSIS — L91.0 HYPERTROPHIC SCAR OF SKIN: ICD-10-CM

## 2024-06-23 DIAGNOSIS — I20.9 ANGINA PECTORIS (HCC): ICD-10-CM

## 2024-06-23 DIAGNOSIS — R94.39 ABNORMAL STRESS TEST: ICD-10-CM

## 2024-06-23 DIAGNOSIS — E11.628 DIABETIC FOOT INFECTION (HCC): ICD-10-CM

## 2024-06-23 DIAGNOSIS — I20.0 WORSENING ANGINA (HCC): ICD-10-CM

## 2024-06-23 DIAGNOSIS — L08.9 DIABETIC FOOT INFECTION (HCC): ICD-10-CM

## 2024-06-23 DIAGNOSIS — I25.700 CORONARY ARTERY DISEASE INVOLVING CORONARY BYPASS GRAFT OF NATIVE HEART WITH UNSTABLE ANGINA PECTORIS (HCC): ICD-10-CM

## 2024-06-23 LAB
ALBUMIN SERPL-MCNC: 4.3 G/DL (ref 3.4–5)
ALBUMIN/GLOB SERPL: 1.3 {RATIO} (ref 1.1–2.2)
ALP SERPL-CCNC: 97 U/L (ref 40–129)
ALT SERPL-CCNC: 11 U/L (ref 10–40)
ANION GAP SERPL CALCULATED.3IONS-SCNC: 17 MMOL/L (ref 3–16)
AST SERPL-CCNC: 13 U/L (ref 15–37)
BASOPHILS # BLD: 0 K/UL (ref 0–0.2)
BASOPHILS NFR BLD: 0.2 %
BILIRUB SERPL-MCNC: 0.4 MG/DL (ref 0–1)
BUN SERPL-MCNC: 54 MG/DL (ref 7–20)
CALCIUM SERPL-MCNC: 10.3 MG/DL (ref 8.3–10.6)
CHLORIDE SERPL-SCNC: 95 MMOL/L (ref 99–110)
CO2 SERPL-SCNC: 23 MMOL/L (ref 21–32)
CREAT SERPL-MCNC: 12 MG/DL (ref 0.9–1.3)
CRP SERPL-MCNC: 6.1 MG/L (ref 0–5.1)
DEPRECATED RDW RBC AUTO: 15.5 % (ref 12.4–15.4)
EKG ATRIAL RATE: 69 BPM
EKG ATRIAL RATE: 86 BPM
EKG DIAGNOSIS: NORMAL
EKG DIAGNOSIS: NORMAL
EKG P AXIS: 67 DEGREES
EKG P AXIS: 76 DEGREES
EKG P-R INTERVAL: 140 MS
EKG P-R INTERVAL: 148 MS
EKG Q-T INTERVAL: 382 MS
EKG Q-T INTERVAL: 440 MS
EKG QRS DURATION: 100 MS
EKG QRS DURATION: 106 MS
EKG QTC CALCULATION (BAZETT): 457 MS
EKG QTC CALCULATION (BAZETT): 471 MS
EKG R AXIS: 39 DEGREES
EKG R AXIS: 80 DEGREES
EKG T AXIS: 1 DEGREES
EKG T AXIS: 50 DEGREES
EKG VENTRICULAR RATE: 69 BPM
EKG VENTRICULAR RATE: 86 BPM
EOSINOPHIL # BLD: 0.2 K/UL (ref 0–0.6)
EOSINOPHIL NFR BLD: 2.7 %
ERYTHROCYTE [SEDIMENTATION RATE] IN BLOOD BY WESTERGREN METHOD: 32 MM/HR (ref 0–20)
GFR SERPLBLD CREATININE-BSD FMLA CKD-EPI: 4 ML/MIN/{1.73_M2}
GLUCOSE SERPL-MCNC: 205 MG/DL (ref 70–99)
HCT VFR BLD AUTO: 41.1 % (ref 40.5–52.5)
HGB BLD-MCNC: 13.4 G/DL (ref 13.5–17.5)
LIPASE SERPL-CCNC: 16 U/L (ref 13–60)
LYMPHOCYTES # BLD: 0.6 K/UL (ref 1–5.1)
LYMPHOCYTES NFR BLD: 7.7 %
MAGNESIUM SERPL-MCNC: 2.1 MG/DL (ref 1.8–2.4)
MCH RBC QN AUTO: 29.5 PG (ref 26–34)
MCHC RBC AUTO-ENTMCNC: 32.7 G/DL (ref 31–36)
MCV RBC AUTO: 90 FL (ref 80–100)
MONOCYTES # BLD: 0.9 K/UL (ref 0–1.3)
MONOCYTES NFR BLD: 10.2 %
NEUTROPHILS # BLD: 6.7 K/UL (ref 1.7–7.7)
NEUTROPHILS NFR BLD: 79.2 %
NT-PROBNP SERPL-MCNC: 3897 PG/ML (ref 0–124)
PLATELET # BLD AUTO: 149 K/UL (ref 135–450)
PMV BLD AUTO: 10.6 FL (ref 5–10.5)
POTASSIUM SERPL-SCNC: 3.7 MMOL/L (ref 3.5–5.1)
PROT SERPL-MCNC: 7.6 G/DL (ref 6.4–8.2)
RBC # BLD AUTO: 4.56 M/UL (ref 4.2–5.9)
REASON FOR REJECTION: NORMAL
REJECTED TEST: NORMAL
SODIUM SERPL-SCNC: 135 MMOL/L (ref 136–145)
TROPONIN, HIGH SENSITIVITY: 140 NG/L (ref 0–22)
TROPONIN, HIGH SENSITIVITY: 167 NG/L (ref 0–22)
TROPONIN, HIGH SENSITIVITY: 172 NG/L (ref 0–22)
TROPONIN, HIGH SENSITIVITY: 177 NG/L (ref 0–22)
WBC # BLD AUTO: 8.4 K/UL (ref 4–11)

## 2024-06-23 PROCEDURE — 83735 ASSAY OF MAGNESIUM: CPT

## 2024-06-23 PROCEDURE — 93010 ELECTROCARDIOGRAM REPORT: CPT | Performed by: INTERNAL MEDICINE

## 2024-06-23 PROCEDURE — 2580000003 HC RX 258: Performed by: INTERNAL MEDICINE

## 2024-06-23 PROCEDURE — 36415 COLL VENOUS BLD VENIPUNCTURE: CPT

## 2024-06-23 PROCEDURE — 86140 C-REACTIVE PROTEIN: CPT

## 2024-06-23 PROCEDURE — 90935 HEMODIALYSIS ONE EVALUATION: CPT

## 2024-06-23 PROCEDURE — 99223 1ST HOSP IP/OBS HIGH 75: CPT | Performed by: INTERNAL MEDICINE

## 2024-06-23 PROCEDURE — 6370000000 HC RX 637 (ALT 250 FOR IP): Performed by: INTERNAL MEDICINE

## 2024-06-23 PROCEDURE — 73630 X-RAY EXAM OF FOOT: CPT

## 2024-06-23 PROCEDURE — 80053 COMPREHEN METABOLIC PANEL: CPT

## 2024-06-23 PROCEDURE — 85025 COMPLETE CBC W/AUTO DIFF WBC: CPT

## 2024-06-23 PROCEDURE — 6370000000 HC RX 637 (ALT 250 FOR IP): Performed by: EMERGENCY MEDICINE

## 2024-06-23 PROCEDURE — 6360000002 HC RX W HCPCS: Performed by: EMERGENCY MEDICINE

## 2024-06-23 PROCEDURE — 85652 RBC SED RATE AUTOMATED: CPT

## 2024-06-23 PROCEDURE — 93005 ELECTROCARDIOGRAM TRACING: CPT | Performed by: EMERGENCY MEDICINE

## 2024-06-23 PROCEDURE — 3E1M39Z IRRIGATION OF PERITONEAL CAVITY USING DIALYSATE, PERCUTANEOUS APPROACH: ICD-10-PCS | Performed by: INTERNAL MEDICINE

## 2024-06-23 PROCEDURE — 6360000002 HC RX W HCPCS: Performed by: INTERNAL MEDICINE

## 2024-06-23 PROCEDURE — 93005 ELECTROCARDIOGRAM TRACING: CPT | Performed by: INTERNAL MEDICINE

## 2024-06-23 PROCEDURE — 71046 X-RAY EXAM CHEST 2 VIEWS: CPT

## 2024-06-23 PROCEDURE — 84484 ASSAY OF TROPONIN QUANT: CPT

## 2024-06-23 PROCEDURE — 80061 LIPID PANEL: CPT

## 2024-06-23 PROCEDURE — 83880 ASSAY OF NATRIURETIC PEPTIDE: CPT

## 2024-06-23 PROCEDURE — 99285 EMERGENCY DEPT VISIT HI MDM: CPT

## 2024-06-23 PROCEDURE — 5A1D70Z PERFORMANCE OF URINARY FILTRATION, INTERMITTENT, LESS THAN 6 HOURS PER DAY: ICD-10-PCS | Performed by: INTERNAL MEDICINE

## 2024-06-23 PROCEDURE — 96374 THER/PROPH/DIAG INJ IV PUSH: CPT

## 2024-06-23 PROCEDURE — 2000000000 HC ICU R&B

## 2024-06-23 PROCEDURE — 83690 ASSAY OF LIPASE: CPT

## 2024-06-23 RX ORDER — SODIUM CHLORIDE 0.9 % (FLUSH) 0.9 %
5-40 SYRINGE (ML) INJECTION PRN
Status: DISCONTINUED | OUTPATIENT
Start: 2024-06-23 | End: 2024-07-04 | Stop reason: HOSPADM

## 2024-06-23 RX ORDER — POLYETHYLENE GLYCOL 3350 17 G/17G
17 POWDER, FOR SOLUTION ORAL DAILY PRN
Status: DISCONTINUED | OUTPATIENT
Start: 2024-06-23 | End: 2024-07-04 | Stop reason: HOSPADM

## 2024-06-23 RX ORDER — SODIUM CHLORIDE, SODIUM LACTATE, CALCIUM CHLORIDE, MAGNESIUM CHLORIDE AND DEXTROSE 2.5; 538; 448; 18.3; 5.08 G/100ML; MG/100ML; MG/100ML; MG/100ML; MG/100ML
3000 INJECTION, SOLUTION INTRAPERITONEAL CONTINUOUS
Status: DISCONTINUED | OUTPATIENT
Start: 2024-06-23 | End: 2024-06-23

## 2024-06-23 RX ORDER — FUROSEMIDE 40 MG/1
40 TABLET ORAL 2 TIMES DAILY
Status: DISCONTINUED | OUTPATIENT
Start: 2024-06-23 | End: 2024-07-04 | Stop reason: HOSPADM

## 2024-06-23 RX ORDER — LEVETIRACETAM 500 MG/1
500 TABLET ORAL 2 TIMES DAILY
Status: DISCONTINUED | OUTPATIENT
Start: 2024-06-23 | End: 2024-07-04 | Stop reason: HOSPADM

## 2024-06-23 RX ORDER — ISOSORBIDE MONONITRATE 30 MG/1
30 TABLET, EXTENDED RELEASE ORAL NIGHTLY
Status: DISCONTINUED | OUTPATIENT
Start: 2024-06-23 | End: 2024-06-28

## 2024-06-23 RX ORDER — ROSUVASTATIN CALCIUM 20 MG/1
40 TABLET, COATED ORAL NIGHTLY
Status: DISCONTINUED | OUTPATIENT
Start: 2024-06-23 | End: 2024-07-04 | Stop reason: HOSPADM

## 2024-06-23 RX ORDER — HYDRALAZINE HYDROCHLORIDE 25 MG/1
50 TABLET, FILM COATED ORAL 2 TIMES DAILY
Status: DISCONTINUED | OUTPATIENT
Start: 2024-06-23 | End: 2024-06-28

## 2024-06-23 RX ORDER — INSULIN LISPRO 100 [IU]/ML
0-8 INJECTION, SOLUTION INTRAVENOUS; SUBCUTANEOUS
Status: DISCONTINUED | OUTPATIENT
Start: 2024-06-23 | End: 2024-06-24

## 2024-06-23 RX ORDER — NITROGLYCERIN 0.4 MG/1
TABLET SUBLINGUAL
Status: DISPENSED
Start: 2024-06-23 | End: 2024-06-24

## 2024-06-23 RX ORDER — LANOLIN ALCOHOL/MO/W.PET/CERES
6 CREAM (GRAM) TOPICAL NIGHTLY PRN
Status: DISCONTINUED | OUTPATIENT
Start: 2024-06-23 | End: 2024-07-04 | Stop reason: HOSPADM

## 2024-06-23 RX ORDER — CARVEDILOL 25 MG/1
25 TABLET ORAL 2 TIMES DAILY
Status: DISCONTINUED | OUTPATIENT
Start: 2024-06-23 | End: 2024-07-04 | Stop reason: HOSPADM

## 2024-06-23 RX ORDER — INSULIN LISPRO 100 [IU]/ML
0-4 INJECTION, SOLUTION INTRAVENOUS; SUBCUTANEOUS NIGHTLY
Status: DISCONTINUED | OUTPATIENT
Start: 2024-06-23 | End: 2024-06-24

## 2024-06-23 RX ORDER — CLOPIDOGREL BISULFATE 75 MG/1
75 TABLET ORAL DAILY
Status: DISCONTINUED | OUTPATIENT
Start: 2024-06-23 | End: 2024-07-04 | Stop reason: HOSPADM

## 2024-06-23 RX ORDER — ONDANSETRON 2 MG/ML
4 INJECTION INTRAMUSCULAR; INTRAVENOUS EVERY 6 HOURS PRN
Status: DISCONTINUED | OUTPATIENT
Start: 2024-06-23 | End: 2024-07-04 | Stop reason: HOSPADM

## 2024-06-23 RX ORDER — SODIUM CHLORIDE 0.9 % (FLUSH) 0.9 %
5-40 SYRINGE (ML) INJECTION EVERY 12 HOURS SCHEDULED
Status: DISCONTINUED | OUTPATIENT
Start: 2024-06-23 | End: 2024-07-04 | Stop reason: HOSPADM

## 2024-06-23 RX ORDER — MORPHINE SULFATE 4 MG/ML
4 INJECTION, SOLUTION INTRAMUSCULAR; INTRAVENOUS ONCE
Status: COMPLETED | OUTPATIENT
Start: 2024-06-23 | End: 2024-06-23

## 2024-06-23 RX ORDER — GENTAMICIN SULFATE 1 MG/G
CREAM TOPICAL DAILY
Status: DISCONTINUED | OUTPATIENT
Start: 2024-06-23 | End: 2024-07-04 | Stop reason: HOSPADM

## 2024-06-23 RX ORDER — FENTANYL CITRATE 50 UG/ML
25 INJECTION, SOLUTION INTRAMUSCULAR; INTRAVENOUS EVERY 6 HOURS PRN
Status: COMPLETED | OUTPATIENT
Start: 2024-06-23 | End: 2024-06-27

## 2024-06-23 RX ORDER — DEXTROSE MONOHYDRATE 100 MG/ML
INJECTION, SOLUTION INTRAVENOUS CONTINUOUS PRN
Status: DISCONTINUED | OUTPATIENT
Start: 2024-06-23 | End: 2024-07-04 | Stop reason: HOSPADM

## 2024-06-23 RX ORDER — GLUCAGON 1 MG/ML
1 KIT INJECTION PRN
Status: DISCONTINUED | OUTPATIENT
Start: 2024-06-23 | End: 2024-07-04 | Stop reason: HOSPADM

## 2024-06-23 RX ORDER — ISOSORBIDE MONONITRATE 60 MG/1
60 TABLET, EXTENDED RELEASE ORAL DAILY
Status: DISCONTINUED | OUTPATIENT
Start: 2024-06-23 | End: 2024-06-28

## 2024-06-23 RX ORDER — SODIUM CHLORIDE 9 MG/ML
INJECTION, SOLUTION INTRAVENOUS PRN
Status: DISCONTINUED | OUTPATIENT
Start: 2024-06-23 | End: 2024-07-04 | Stop reason: HOSPADM

## 2024-06-23 RX ORDER — ACETAMINOPHEN 650 MG/1
650 SUPPOSITORY RECTAL EVERY 6 HOURS PRN
Status: DISCONTINUED | OUTPATIENT
Start: 2024-06-23 | End: 2024-07-04 | Stop reason: HOSPADM

## 2024-06-23 RX ORDER — ACETAMINOPHEN 325 MG/1
650 TABLET ORAL EVERY 6 HOURS PRN
Status: DISCONTINUED | OUTPATIENT
Start: 2024-06-23 | End: 2024-07-04 | Stop reason: HOSPADM

## 2024-06-23 RX ORDER — RANOLAZINE 500 MG/1
500 TABLET, EXTENDED RELEASE ORAL 2 TIMES DAILY
Status: DISCONTINUED | OUTPATIENT
Start: 2024-06-23 | End: 2024-07-04 | Stop reason: HOSPADM

## 2024-06-23 RX ORDER — ONDANSETRON 4 MG/1
4 TABLET, ORALLY DISINTEGRATING ORAL EVERY 8 HOURS PRN
Status: DISCONTINUED | OUTPATIENT
Start: 2024-06-23 | End: 2024-07-04 | Stop reason: HOSPADM

## 2024-06-23 RX ORDER — HEPARIN SODIUM 5000 [USP'U]/ML
5000 INJECTION, SOLUTION INTRAVENOUS; SUBCUTANEOUS EVERY 8 HOURS SCHEDULED
Status: DISCONTINUED | OUTPATIENT
Start: 2024-06-23 | End: 2024-06-27

## 2024-06-23 RX ORDER — ASPIRIN 81 MG/1
81 TABLET, CHEWABLE ORAL DAILY
Status: DISCONTINUED | OUTPATIENT
Start: 2024-06-24 | End: 2024-07-04 | Stop reason: HOSPADM

## 2024-06-23 RX ORDER — ENOXAPARIN SODIUM 100 MG/ML
40 INJECTION SUBCUTANEOUS DAILY
Status: DISCONTINUED | OUTPATIENT
Start: 2024-06-24 | End: 2024-06-23

## 2024-06-23 RX ORDER — ASPIRIN 81 MG/1
81 TABLET ORAL DAILY
Status: DISCONTINUED | OUTPATIENT
Start: 2024-06-24 | End: 2024-06-24

## 2024-06-23 RX ADMIN — RANOLAZINE 500 MG: 500 TABLET, EXTENDED RELEASE ORAL at 12:49

## 2024-06-23 RX ADMIN — ISOSORBIDE MONONITRATE 30 MG: 30 TABLET, EXTENDED RELEASE ORAL at 21:46

## 2024-06-23 RX ADMIN — FENTANYL CITRATE 25 MCG: 50 INJECTION INTRAMUSCULAR; INTRAVENOUS at 12:49

## 2024-06-23 RX ADMIN — FUROSEMIDE 40 MG: 40 TABLET ORAL at 17:22

## 2024-06-23 RX ADMIN — SODIUM CHLORIDE, PRESERVATIVE FREE 10 ML: 5 INJECTION INTRAVENOUS at 12:50

## 2024-06-23 RX ADMIN — NITROGLYCERIN 0.5 INCH: 20 OINTMENT TOPICAL at 05:12

## 2024-06-23 RX ADMIN — MORPHINE SULFATE 4 MG: 4 INJECTION, SOLUTION INTRAMUSCULAR; INTRAVENOUS at 05:14

## 2024-06-23 RX ADMIN — CARVEDILOL 25 MG: 25 TABLET, FILM COATED ORAL at 12:49

## 2024-06-23 RX ADMIN — HEPARIN SODIUM 5000 UNITS: 5000 INJECTION INTRAVENOUS; SUBCUTANEOUS at 12:50

## 2024-06-23 RX ADMIN — CLOPIDOGREL BISULFATE 75 MG: 75 TABLET ORAL at 12:49

## 2024-06-23 RX ADMIN — ROSUVASTATIN 40 MG: 20 TABLET, FILM COATED ORAL at 20:41

## 2024-06-23 RX ADMIN — SERTRALINE 50 MG: 50 TABLET, FILM COATED ORAL at 12:49

## 2024-06-23 RX ADMIN — ISOSORBIDE MONONITRATE 60 MG: 60 TABLET, EXTENDED RELEASE ORAL at 12:49

## 2024-06-23 RX ADMIN — RANOLAZINE 500 MG: 500 TABLET, EXTENDED RELEASE ORAL at 20:41

## 2024-06-23 RX ADMIN — CARVEDILOL 25 MG: 25 TABLET, FILM COATED ORAL at 21:46

## 2024-06-23 RX ADMIN — LEVETIRACETAM 500 MG: 500 TABLET, FILM COATED ORAL at 12:49

## 2024-06-23 RX ADMIN — FUROSEMIDE 40 MG: 40 TABLET ORAL at 12:49

## 2024-06-23 RX ADMIN — HEPARIN SODIUM 5000 UNITS: 5000 INJECTION INTRAVENOUS; SUBCUTANEOUS at 21:38

## 2024-06-23 RX ADMIN — SODIUM CHLORIDE, PRESERVATIVE FREE 10 ML: 5 INJECTION INTRAVENOUS at 20:39

## 2024-06-23 RX ADMIN — LEVETIRACETAM 500 MG: 500 TABLET, FILM COATED ORAL at 20:41

## 2024-06-23 RX ADMIN — HYDRALAZINE HYDROCHLORIDE 50 MG: 25 TABLET ORAL at 21:47

## 2024-06-23 ASSESSMENT — PAIN DESCRIPTION - LOCATION
LOCATION: CHEST
LOCATION: CHEST

## 2024-06-23 ASSESSMENT — PAIN SCALES - GENERAL
PAINLEVEL_OUTOF10: 4
PAINLEVEL_OUTOF10: 4
PAINLEVEL_OUTOF10: 2
PAINLEVEL_OUTOF10: 3

## 2024-06-23 ASSESSMENT — PAIN DESCRIPTION - ORIENTATION
ORIENTATION: MID
ORIENTATION: MID

## 2024-06-23 ASSESSMENT — PAIN - FUNCTIONAL ASSESSMENT: PAIN_FUNCTIONAL_ASSESSMENT: 0-10

## 2024-06-23 ASSESSMENT — PAIN DESCRIPTION - DESCRIPTORS
DESCRIPTORS: PRESSURE;TIGHTNESS
DESCRIPTORS: PRESSURE

## 2024-06-23 NOTE — CONSULTS
PODIATRY CONSULT NOTE    HISTORY OF PRESENT ILLNESS:                The patient is a 59 y.o. male with significant past medical history as stated below who is consulted for a left foot wound. Patient is an established patient of Dr. 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 He does relate to some mild drainage from the site, which is new, and also pain.     Past Medical History:        Diagnosis Date    Angina at rest (Former[facility])     Cardiomyopathy (Formerly [facility])     Carotid artery stenosis 10/25/2016    SUZANNA stented with 8 x 30 mm non-tapered Xact stent    CHF (congestive heart failure) ([facility]) 03/03/2015    EF 30%     CKD (chronic kidney disease) stage 2, GFR 60-89 ml/min     Coronary artery disease     sp CABG [facility]    Diabetic peripheral neuropathy ([facility])     Diastolic HF (heart failure) ([facility])     Hyperlipidemia with target LDL less than 70     Hypertension goal BP (blood pressure) < 130/80     PVD (peripheral vascular disease) ([facility])     Seizures (Formerly [facility])     in childhood    Type 1 diabetes mellitus with chronic kidney disease ([facility]ly [facility])     c-peptide <0.1 in 2015       Past Surgical History:        Procedure Laterality Date    BLADDER SURGERY Left 08/24/2023    CYSTOSCOPY, LEFT URETEROSCOPY, STONE BASKET MANIPULATION, PLACEMENT OF LEFT URETERAL STENT performed by Chevy Sepulveda MD at [facility] OR    CARDIAC CATHETERIZATION      CARDIAC SURGERY  2001    triple bypass at     CAROTID STENT PLACEMENT Right     CORONARY ARTERY BYPASS GRAFT      FOOT DEBRIDEMENT Left 10/16/2023    LEFT FOOT DEBRIDEMENT INCISION AND DRAINAGE WITH BONE BIOPSY performed by David Lowe DPM at [facility] OR    FOOT DEBRIDEMENT Left 10/23/2023    LEFT FOOT INCISION AND DRAINAGE WITH DELAYED PRIMARY CLOSURE AND DERMAL GRAFT APPLICATION performed by David Lowe DPM at [facility] OR    HERNIA REPAIR      infant    LAPAROSCOPY  Samples Given: Neutrogena hand cream Detail Level: Zone yes

## 2024-06-23 NOTE — FLOWSHEET NOTE
06/23/24 1210   Vitals   Pulse 83   Heart Rate Source Monitor   Respirations 20   BP (!) 180/68   MAP (Calculated) 105   MAP (mmHg) 101   BP Location Left upper arm   BP Upper/Lower Upper   BP Method Automatic   Pain Assessment   Pain Assessment 0-10   Pain Level 4   Patient's Stated Pain Goal 1   Pain Location Chest   Pain Orientation Mid   Pain Descriptors Pressure;Tightness     Patient voiced chest pain 4/10 and climbing.  EKG done.  Dr. Yancey at bedside.

## 2024-06-23 NOTE — ACP (ADVANCE CARE PLANNING)
Advance Care Planning Note       Purpose of Encounter: Advanced care planning in light of hospitalization for chest pain  Parties in attendance: :Te Garay, FLORENTIN JORDAN MD  Decisional Capacity:Yes  Objective: This 59 y.o. male  with PMHx of CAD; s/p CABG/ recent PCI& stent HFpEF, ESRD on PD, hypertension, hyperlipidemia diabetes mellitus, and seizure disorder who presented with chest pain.   Goals of Care Determinations: Patient wants full support measures including CPR, intubation, trach, PEG tube, dialysis   Code Status:   Time Spent on Advanced Planning Documents: 17 mins.  Advanced Care Planning Documents:  Completed advances directives, advanced directives in chart.      Electronically signed by FLORENTIN JORDAN MD on 6/23/2024 at 1:33 PM

## 2024-06-23 NOTE — PLAN OF CARE
Problem: Neurosensory - Adult  Goal: Achieves stable or improved neurological status  Outcome: Progressing     Problem: Respiratory - Adult  Goal: Achieves optimal ventilation and oxygenation  Outcome: Progressing     Problem: Cardiovascular - Adult  Goal: Maintains optimal cardiac output and hemodynamic stability  Outcome: Progressing     Problem: Skin/Tissue Integrity - Adult  Goal: Skin integrity remains intact  Outcome: Progressing     Problem: Musculoskeletal - Adult  Goal: Return mobility to safest level of function  Outcome: Progressing     Problem: Gastrointestinal - Adult  Goal: Minimal or absence of nausea and vomiting  Outcome: Progressing     Problem: Infection - Adult  Goal: Absence of infection at discharge  Outcome: Progressing     Problem: Metabolic/Fluid and Electrolytes - Adult  Goal: Electrolytes maintained within normal limits  Outcome: Progressing

## 2024-06-23 NOTE — CONSULTS
[facility]   CONSULTATION  [phone]      Chief Complaint   Patient presents with    Chest Pain     Pt. With CP tonight- mid sternal. Arrived by [place] squad and pt. Given 324 asa en route.             History of Present Illness:  Te Garay is a 59 y.o. patient who presented to the hospital with complaints of chest pain. I have been asked to provide consultation regarding further management and testing. Te reports that he had been feeling good recently and has been compliant with his DAPT. He states that yesterday he wasn't hungry and then ate lunch. Later that evening, he woke up with non-radiating sharp chest discomfort. He states that the discomfort did not change with a deep breath or position. He states that it felt like he needed to burp, but did not. He states that he ran out of nitroglycerin and didn't take any. He states that morphine in the ER helped his discomfort. He notes that the discomfort is different from his prior anginal equivalent, which was predominantly shortness of breath. He is feeling better, but has not received his morning medications yet.       Past Medical History:   has a past medical history of Angina at rest ([facility]), Cardiomyopathy ([facility]), Carotid artery stenosis, CHF (congestive heart failure) ([facility]), CKD (chronic kidney disease) stage 2, GFR 60-89 ml/min, Coronary artery disease, Diabetic peripheral neuropathy ([facility]), Diastolic HF (heart failure) ([facility]), Hyperlipidemia with target LDL less than 70, Hypertension goal BP (blood pressure) < 130/80, PVD (peripheral vascular disease) ([facility]), Seizures ([facility]), and Type 1 diabetes mellitus with chronic kidney disease ([facility]).    Surgical History:   has a past surgical history that includes Cardiac surgery (2001); Coronary artery bypass graft; Cardiac catheterization; hernia repair; Tonsillectomy; LAPAROSCOPY INSERTION PERITONEAL CATHETER (N/A, 06/03/2020); Carotid stent placement (Right); Bladder surgery (Left, 08/24/2023);

## 2024-06-23 NOTE — PROGRESS NOTES
CCPD Order   Exchanges: 3   Exchange Volume: 2300 ml   Total Time: 8 hrs   Dextrose: 2.5%   Last Fill:  0 ml   Total Volume: 6900 ml     Orders verified. Supplies taken to pt's room. Report received. Cycler set up, primed and pre tested. Dressing changed on [facility] Cath site. Pt connected to cycler. CCPD initiated without problem. Initial effluent clear.       If problems should arise please call the [phone] on top of PD cycler machine.

## 2024-06-23 NOTE — FLOWSHEET NOTE
06/23/24 0815   Vitals   Temp 97.3 °F (36.3 °C)   Temp Source Temporal   Pulse 72   Heart Rate Source Monitor   Respirations 18   BP (!) 142/77   MAP (Calculated) 99   MAP (mmHg) 97   BP Location Left upper arm   BP Upper/Lower Upper   BP Method Automatic   Patient Position Semi fowlers   Cardiac Rhythm Sinus rhythm   Pain Assessment   Pain Assessment None - Denies Pain   Pain Level 2   Oxygen Therapy   SpO2 100 %   Pulse Oximetry Type Intermittent   Pulse Oximeter Device Mode Intermittent   Pulse Oximeter Device Location Finger   O2 Device None (Room air)   Height and Weight   Height 1.727 m (5' 8\")   Weight - Scale 85 kg (187 lb 6.3 oz)   Weight Method Bed scale   BSA (Calculated - sq m) 2.02 sq meters   BMI (Calculated) 28.6     Patient admit from ED for CP at home.  Patient states chest pressure 2/10.  No SOB.   PD patient, cycle at HS.  Not currently dwelling.  Patient is A&Ox4.  VSS on room air.

## 2024-06-23 NOTE — ED PROVIDER NOTES
Emergency Department Provider Note  Location: [facility] EMERGENCY DEPARTMENT  6/23/2024     Patient Identification  Te Garay is a 59 y.o. male    Chief Complaint  Chest Pain (Pt. With CP tonight- mid sternal. Arrived by [place] squ and pt. Given 324 asa en route. )      HPI  (History provided by patient and spouse/SO)  This is a 59 y.o. male with a PMH significant for CAD s/p CABG and stent placement, ESRD, DM presented today for chest pain.  Patient states the chest pain woke him up in the middle of the night tonight.  He initially thought he needed to burp so he sat up.  He felt slightly better so he tried to go back to sleep but 20 minutes later, the pain recurred and forced him to sit up again.  This time he felt nauseous and diaphoretic.  He went to the bathroom and started vomiting.  He denies shortness of breath.  Now the pain subsided again to a level 2/10 intensity. EMS gave 324 mg ASA en route.       No other complaints, modifying factors or associated symptoms.        I have reviewed the following nursing documentation:  Allergies:   Allergies   Allergen Reactions    Iodides Anaphylaxis    Shellfish-Derived Products Anaphylaxis    Lipitor [Atorvastatin]     Atorvastatin Calcium Rash       Past medical history:  has a past medical history of Angina at rest ([facility]), Cardiomyopathy ([facility]), Carotid artery stenosis (10/25/2016), CHF (congestive heart failure) ([facility]) (03/03/2015), CKD (chronic kidney disease) stage 2, GFR 60-89 ml/min, Coronary artery disease, Diabetic peripheral neuropathy ([facility]), Diastolic HF (heart failure) ([facility]), Hyperlipidemia with target LDL less than 70, Hypertension goal BP (blood pressure) < 130/80, PVD (peripheral vascular disease) ([facility]), Seizures ([facility]), and Type 1 diabetes mellitus with chronic kidney disease ([facility]).    Past surgical history:  has a past surgical history that includes Cardiac surgery (2001); Coronary artery bypass graft; Cardiac catheterization;  Repeat troponin was in the 140s.  - Chest x-ray did not show evidence of acute cardiopulmonary disease.  X-ray of the left foot did not show osteomyelitis.  Radiologist read out thickened soft tissue with concern for possible cellulitis.  However on exam, the thickened soft tissue is from healed skin and it is not warm.  Exam is inconsistent with cellulitis.    - Patient was given    ED Medication Orders (From admission, onward)      Start Ordered     Status Ordering Provider    06/23/24 0515 06/23/24 0508  morphine injection 4 mg  ONCE         Last MAR action: Given - by SHRUTHI BROWN on 06/23/24 at 0514 BRANDIE RAMSEY    06/23/24 0430 06/23/24 0415  nitroglycerin (NITRO-BID) 2 % ointment 0.5 inch  ONCE         Last MAR action: Given - by SHRUTHI BORWN on 06/23/24 at 0512 BRANDIE RAMSEY            - I discussed the results with patient and spouse/SO.  We agreed to admission for chest pain evaluation.     - Is this patient to be included in the SEP-1 Core Measure due to severe sepsis or septic shock?   No   Exclusion criteria - the patient is NOT to be included for SEP-1 Core Measure due to:  Infection is not suspected    I spoke with Dr. Jason, hospitalist We thoroughly discussed the history, physical exam, laboratory and imaging studies, as well as, emergency department course. Based upon that discussion, we've decided to admit Te Garay for further observation and evaluation of Te Garay's chest pain.  As I have deemed necessary from their history, physical, and studies, I have considered and evaluated Te Garay for the following diagnoses:  ACUTE CORONARY SYNDROME, PERICARDIAL TAMPONADE, PNEUMOTHORAX, PULMONARY EMBOLISM, and THORACIC DISSECTION.    Clinical Impression:  1. Chest pain, unspecified type    2. Hypertrophic scar of skin          Disposition:  Admit to telemetry in stable condition.    Blood pressure (!) 147/69, pulse 85, temperature 98.8 °F (37.1 °C), temperature source Oral,

## 2024-06-23 NOTE — ED NOTES
How does patient ambulate?   [x]Low Fall Risk (ambulates by themselves without support)  []Stand by assist   []Contact Guard   []Front wheel walker  []Wheelchair   []Steady  []Bed bound  []History of Lower Extremity Amputation  []Unknown, did not assess in the emergency department   How does patient take pills?  [x]Whole with Water  []Crushed in applesauce  []Crushed in pudding  []Other  []Unknown no oral medications were given in the ED  Is patient alert?   [x]Alert  []Drowsy but responds to voice  []Doesn't respond to voice but responds to painful stimuli  []Unresponsive  Is patient oriented?   [x]To person  [x]To place  [x]To time  [x]To situation  []Confused  []Agitated  []Follows commands  If patient is disoriented or from a Skill Nursing Facility has family been notified of admission?   []Yes   [x]No  Patient belongings?   [x]Cell phone  []Wallet   []Dentures  [x]Clothing  Any specific patient or family belongings/needs/dynamics?   N/a  Miscellaneous comments/pending orders?  N/a     If there are any additional questions please reach out to the Emergency Department.

## 2024-06-23 NOTE — CONSULTS
Nephrology Consult Note  [facility]  [phone]  [place]    Referring Physician:  Reva López MD    Reason:    ESRD    HPI:  58 y/o with history of ESRD, Type I DM, and HTN presents to the hospital with complaints of nausea and chest discomfort. He reports that this woke him up from sleep. Noted associated sweats and vomiting. Better now. In regards to ESRD he is followed by Dr. Allen. He is on CCPD. On the cycler for 8 hours with 3 exchanges     Past Medical History   Active Ambulatory Problems     Diagnosis Date Noted    Coronary artery disease due to lipid rich plaque 05/26/2010    Hyperlipidemia 05/26/2010    Multifocal pneumonia 03/02/2015    CHF (congestive heart failure) ([facility])     Hypertension due to end stage renal disease caused by type 1 diabetes mellitus, on dialysis ([facility])     Diabetic polyneuropathy associated with type 2 diabetes mellitus ([facility])     PVD (peripheral vascular disease) ([facility])     Diabetic nephropathy associated with type 1 diabetes mellitus ([facility]) 08/17/2015    H/O carotid angioplasty     Seizure disorder ([facility])     HTN (hypertension), benign     S/P CABG (coronary artery bypass graft) 05/24/2018    End-stage renal disease on peritoneal dialysis ([facility])     Moderate obstructive sleep apnea 03/24/2021    Unstable angina ([facility]) 04/08/2021    Peritonitis associated with peritoneal dialysis ([facility]) 12/07/2021    Peritoneal dialysis catheter in place ([facility])     Poorly controlled type 1 diabetes mellitus ([facility])     Overweight (BMI 25.0-29.9)     HFrEF (heart failure with reduced ejection fraction) ([facility]) 10/01/2022    History of carotid stenosis 10/04/2022    History of stroke 07/04/2023    Diabetic ulcer of left foot ([facility]) 07/05/2023    A-fib ([facility]) 10/16/2023    Anxiety disorder 11/20/2023    DKA, type 1, not at goal ([facility]) 04/16/2024    History of peritonitis 04/16/2024    Pancytopenia ([facility]) 04/16/2024    History of COVID-19 04/16/2024    Acidosis 04/18/2024    De

## 2024-06-23 NOTE — ED NOTES
ED TO INPATIENT SBAR HANDOFF    Patient Name: Te Garay   :  1965  59 y.o.   MRN:  [id number]  Preferred Name    ED Room #:  ED-0001/01  Family/Caregiver Present yes   Restraints no   Sitter no   Sepsis Risk Score      Situation  Code Status: Prior Limited Code details: Intubation/Re-intubation No Comment; Defibrillation/Cardioversion No Comment; Chest Compressions No Comment; Resuscitative Medications No Comment; Other No Comment.    Allergies: Iodides, Shellfish-derived products, Lipitor [atorvastatin], and Atorvastatin calcium  Weight: Patient Vitals for the past 96 hrs (Last 3 readings):   Weight   24 0343 86.2 kg (190 lb)     Arrived from: home  Chief Complaint:   Chief Complaint   Patient presents with    Chest Pain     Pt. With CP tonight- mid sternal. Arrived by [place] squad and pt. Given 324 asa en route.      Hospital Problem/Diagnosis:  Principal Problem:    Chest pain  Resolved Problems:    * No resolved hospital problems. *    Imaging:   XR CHEST (2 VW)   Final Result   There is no evidence of acute chest disease.         XR FOOT LEFT (MIN 3 VIEWS)   Final Result   1. Soft tissue swelling at the lateral aspect of the foot suggesting   cellulitis.   2. No evidence of osteomyelitis.   3. Chronic arthropathy at the Lisfranc joint most likely neurogenic   arthropathy.           Abnormal labs:   Abnormal Labs Reviewed   CBC WITH AUTO DIFFERENTIAL - Abnormal; Notable for the following components:       Result Value    Hemoglobin 13.4 (*)     RDW 15.5 (*)     MPV 10.6 (*)     Lymphocytes Absolute 0.6 (*)     All other components within normal limits   COMPREHENSIVE METABOLIC PANEL W/ REFLEX TO MG FOR LOW K - Abnormal; Notable for the following components:    Sodium 135 (*)     Chloride 95 (*)     Anion Gap 17 (*)     Glucose 205 (*)     BUN 54 (*)     Creatinine 12.0 (*)     Est, Glom Filt Rate 4 (*)     AST 13 (*)     All other components within normal limits    Narrative:     CALL  Smith   Rasmussen Coon Scale  Pain Scale: Pain Assessment  Pain Assessment: 0-10  Pain Level: 3  Last documented pain score (0-10 scale) Pain Level: 3  Last documented pain medication administered:   Mental Status: oriented, alert, and coherent  Orientation Level:    NIH Score:    C-SSRS: Risk of Suicide: No Risk  Bedside swallow:    [place] Coma Scale (GCS): [place] Coma Scale  Eye Opening: Spontaneous  Best Verbal Response: Oriented  Best Motor Response: Obeys commands  [place] Coma Scale Score: 15  Active LDA's:   Peripheral IV 06/23/24 Distal;Right Cephalic (Active)                 PO Status: Regular  Pertinent or High Risk Medications/Drips: no   If Yes, please provide details:   Pending Blood Product Administration:        You may also review the ED PT Care Timeline found under the Summary Nursing Index tab.    Recommendation    Pending orders none  Plan for Discharge (if known):   Additional Comments: independent   If any further questions, please call Sending RN at ED [phone]    Electronically signed by: Electronically signed by SHRUTHI BROWN RN on 6/23/2024 at 7:00 AM

## 2024-06-23 NOTE — H&P
HOSPITALISTS HISTORY AND PHYSICAL    2024 9:13 AM    Patient Information:  DIDIER RUVALCABA is a 59 y.o. male [id number]  PCP:  Reid Lei MD (Tel: [phone] )    Chief complaint:    Chief Complaint   Patient presents with    Chest Pain     Pt. With CP tonight- mid sternal. Arrived by [place] squad and pt. Given 324 asa en route.         History of Present Illness:  Didier Ruvalcaba is a 59 y.o. male  with PMHx of CAD; s/p CABG/ recent PCI& stent HFpEF, ESRD on PD, hypertension, hyperlipidemia diabetes mellitus, and seizure disorder who presented with chest pain. Patient stated that he woke up in the middle of night with sudden onset of chest pain. Initially thought it was indigestion and tried to go back to sleep but pain recurred after 20 minutes.  Patient described chest pain as pressure,2/10 in intensity, associated with diaphoresis, nausea and vomiting.  Patient also reported left foot pain but but denied any fever, chills, palpitations, SOB,  orthopnea or abdominal pain.  On admission, patient was hemodynamically stable.  Initial diagnostic lab work showed creatinine: 12, BUN: 54, B .  EKG negative for acute ischemic pathology. CXR negative for acute pathology.  X-ray left foot showed soft tissue along the lateral aspect of foot suggestive of cellulitis.  No evidence of osteomyelitis.        REVIEW OF SYSTEMS:   Detailed 12 point ROS obtained which were negative except what mentioned above in HPI    Past Medical History:   has a past medical history of Angina at rest ([facility]), Cardiomyopathy ([facility]), Carotid artery stenosis, CHF (congestive heart failure) ([facility]), CKD (chronic kidney disease) stage 2, GFR 60-89 ml/min, Coronary artery disease, Diabetic peripheral neuropathy ([facility]), Diastolic HF (heart failure) ([facility]), Hyperlipidemia with target LDL less than 70, Hypertension goal BP  clarification, please do not hesitate to contact me through [facility].       FLORENTIN JORDAN MD    6/23/2024 9:13 AM

## 2024-06-24 ENCOUNTER — APPOINTMENT (OUTPATIENT)
Age: 59
End: 2024-06-24
Attending: INTERNAL MEDICINE
Payer: MEDICARE

## 2024-06-24 LAB
ANION GAP SERPL CALCULATED.3IONS-SCNC: 13 MMOL/L (ref 3–16)
BUN SERPL-MCNC: 57 MG/DL (ref 7–20)
CALCIUM SERPL-MCNC: 9 MG/DL (ref 8.3–10.6)
CHLORIDE SERPL-SCNC: 98 MMOL/L (ref 99–110)
CHOLEST SERPL-MCNC: 139 MG/DL (ref 0–199)
CO2 SERPL-SCNC: 25 MMOL/L (ref 21–32)
CREAT SERPL-MCNC: 11.4 MG/DL (ref 0.9–1.3)
DEPRECATED RDW RBC AUTO: 15 % (ref 12.4–15.4)
ECHO BSA: 2 M2
GFR SERPLBLD CREATININE-BSD FMLA CKD-EPI: 5 ML/MIN/{1.73_M2}
GLUCOSE SERPL-MCNC: 183 MG/DL (ref 70–99)
HCT VFR BLD AUTO: 36.1 % (ref 40.5–52.5)
HDLC SERPL-MCNC: 43 MG/DL (ref 40–60)
HGB BLD-MCNC: 12.2 G/DL (ref 13.5–17.5)
LDLC SERPL CALC-MCNC: 67 MG/DL
MCH RBC QN AUTO: 30.1 PG (ref 26–34)
MCHC RBC AUTO-ENTMCNC: 33.6 G/DL (ref 31–36)
MCV RBC AUTO: 89.6 FL (ref 80–100)
NUC STRESS EJECTION FRACTION: 37 %
NUC STRESS LV EDV: 206 ML (ref 67–155)
NUC STRESS LV ESV: 130 ML (ref 22–58)
NUC STRESS LV MASS: 202 G
PLATELET # BLD AUTO: 120 K/UL (ref 135–450)
PMV BLD AUTO: 10 FL (ref 5–10.5)
POTASSIUM SERPL-SCNC: 3.8 MMOL/L (ref 3.5–5.1)
RBC # BLD AUTO: 4.03 M/UL (ref 4.2–5.9)
SODIUM SERPL-SCNC: 136 MMOL/L (ref 136–145)
STRESS BASELINE DIAS BP: 69 MMHG
STRESS BASELINE HR: 86 BPM
STRESS BASELINE SYS BP: 148 MMHG
STRESS ESTIMATED WORKLOAD: 1 METS
STRESS EXERCISE DUR MIN: 4 MIN
STRESS EXERCISE DUR SEC: 0 SEC
STRESS O2 SAT PEAK: 97 %
STRESS O2 SAT REST: 99 %
STRESS PEAK DIAS BP: 54 MMHG
STRESS PEAK SYS BP: 91 MMHG
STRESS PERCENT HR ACHIEVED: 59 %
STRESS POST PEAK HR: 96 BPM
STRESS RATE PRESSURE PRODUCT: 8736 BPM*MMHG
STRESS TARGET HR: 163 BPM
TID: 1.02
TRIGL SERPL-MCNC: 147 MG/DL (ref 0–150)
VLDLC SERPL CALC-MCNC: 29 MG/DL
WBC # BLD AUTO: 4.1 K/UL (ref 4–11)

## 2024-06-24 PROCEDURE — 85027 COMPLETE CBC AUTOMATED: CPT

## 2024-06-24 PROCEDURE — 6360000002 HC RX W HCPCS: Performed by: INTERNAL MEDICINE

## 2024-06-24 PROCEDURE — 6370000000 HC RX 637 (ALT 250 FOR IP): Performed by: INTERNAL MEDICINE

## 2024-06-24 PROCEDURE — 2000000000 HC ICU R&B

## 2024-06-24 PROCEDURE — 3430000000 HC RX DIAGNOSTIC RADIOPHARMACEUTICAL: Performed by: INTERNAL MEDICINE

## 2024-06-24 PROCEDURE — 99232 SBSQ HOSP IP/OBS MODERATE 35: CPT | Performed by: INTERNAL MEDICINE

## 2024-06-24 PROCEDURE — 78452 HT MUSCLE IMAGE SPECT MULT: CPT | Performed by: INTERNAL MEDICINE

## 2024-06-24 PROCEDURE — 93017 CV STRESS TEST TRACING ONLY: CPT

## 2024-06-24 PROCEDURE — 93016 CV STRESS TEST SUPVJ ONLY: CPT | Performed by: INTERNAL MEDICINE

## 2024-06-24 PROCEDURE — 78452 HT MUSCLE IMAGE SPECT MULT: CPT

## 2024-06-24 PROCEDURE — 80048 BASIC METABOLIC PNL TOTAL CA: CPT

## 2024-06-24 PROCEDURE — 2580000003 HC RX 258: Performed by: INTERNAL MEDICINE

## 2024-06-24 PROCEDURE — 93018 CV STRESS TEST I&R ONLY: CPT | Performed by: INTERNAL MEDICINE

## 2024-06-24 PROCEDURE — A9502 TC99M TETROFOSMIN: HCPCS | Performed by: INTERNAL MEDICINE

## 2024-06-24 RX ORDER — REGADENOSON 0.08 MG/ML
0.4 INJECTION, SOLUTION INTRAVENOUS
Status: COMPLETED | OUTPATIENT
Start: 2024-06-24 | End: 2024-06-24

## 2024-06-24 RX ORDER — AMINOPHYLLINE 25 MG/ML
100 INJECTION, SOLUTION INTRAVENOUS
Status: COMPLETED | OUTPATIENT
Start: 2024-06-24 | End: 2024-06-24

## 2024-06-24 RX ADMIN — TETROFOSMIN 10.7 MILLICURIE: 1.38 INJECTION, POWDER, LYOPHILIZED, FOR SOLUTION INTRAVENOUS at 11:17

## 2024-06-24 RX ADMIN — SERTRALINE 50 MG: 50 TABLET, FILM COATED ORAL at 09:17

## 2024-06-24 RX ADMIN — AMINOPHYLLINE 100 MG: 25 INJECTION, SOLUTION INTRAVENOUS at 12:20

## 2024-06-24 RX ADMIN — FUROSEMIDE 40 MG: 40 TABLET ORAL at 16:46

## 2024-06-24 RX ADMIN — HEPARIN SODIUM 5000 UNITS: 5000 INJECTION INTRAVENOUS; SUBCUTANEOUS at 21:10

## 2024-06-24 RX ADMIN — ISOSORBIDE MONONITRATE 30 MG: 30 TABLET, EXTENDED RELEASE ORAL at 21:07

## 2024-06-24 RX ADMIN — CARVEDILOL 25 MG: 25 TABLET, FILM COATED ORAL at 21:07

## 2024-06-24 RX ADMIN — RANOLAZINE 500 MG: 500 TABLET, EXTENDED RELEASE ORAL at 21:06

## 2024-06-24 RX ADMIN — REGADENOSON 0.4 MG: 0.08 INJECTION, SOLUTION INTRAVENOUS at 12:10

## 2024-06-24 RX ADMIN — HEPARIN SODIUM 5000 UNITS: 5000 INJECTION INTRAVENOUS; SUBCUTANEOUS at 05:53

## 2024-06-24 RX ADMIN — RANOLAZINE 500 MG: 500 TABLET, EXTENDED RELEASE ORAL at 09:17

## 2024-06-24 RX ADMIN — SODIUM CHLORIDE, PRESERVATIVE FREE 10 ML: 5 INJECTION INTRAVENOUS at 09:20

## 2024-06-24 RX ADMIN — ROSUVASTATIN 40 MG: 20 TABLET, FILM COATED ORAL at 21:07

## 2024-06-24 RX ADMIN — HYDRALAZINE HYDROCHLORIDE 50 MG: 25 TABLET ORAL at 09:17

## 2024-06-24 RX ADMIN — SODIUM CHLORIDE, PRESERVATIVE FREE 10 ML: 5 INJECTION INTRAVENOUS at 21:07

## 2024-06-24 RX ADMIN — HYDRALAZINE HYDROCHLORIDE 50 MG: 25 TABLET ORAL at 21:06

## 2024-06-24 RX ADMIN — LEVETIRACETAM 500 MG: 500 TABLET, FILM COATED ORAL at 21:07

## 2024-06-24 RX ADMIN — TETROFOSMIN 30.4 MILLICURIE: 1.38 INJECTION, POWDER, LYOPHILIZED, FOR SOLUTION INTRAVENOUS at 12:12

## 2024-06-24 RX ADMIN — LEVETIRACETAM 500 MG: 500 TABLET, FILM COATED ORAL at 09:17

## 2024-06-24 NOTE — PROGRESS NOTES
Treatment time: 8 Hours 45 minutes  Net UF: 1540 ml  Dwell Time: 31 minutes gained    Treatment completed without complications or complaints from patient. Effluent clear yellow and lines taped to patient per protocol. Patient resting comfortably with VSS upon exiting room.      Copy of dialysis treatment record placed in chart, to be scanned into EMR and report given to Nahomy Mccarthy RN.

## 2024-06-24 NOTE — PROGRESS NOTES
HOSPITALISTS PROGRESS NOTE    6/24/2024 7:17 AM        Name: Te Garay .              Admitted: 6/23/2024  Primary Care Provider: Reid Lei MD (Tel: [phone])      Brief Course: 59 y.o. male  with PMHx of CAD; s/p CABG/ recent PCI& stent HFpEF, ESRD on PD, hypertension, hyperlipidemia diabetes mellitus, and seizure disorder who presented with chest pain     Interval history:   Pt seen and examined today   Overnight events noted and interval ancillary notes reviewed.  S/p PD overnight; creatinine remains elevated around 11.4 K.  Stated that he did not have chest pain since yesterday afternoon.  Denied any fever, chills, SOB, palpitations nausea vomiting or abdominal pain  Plan for stress test today      Assessment & Plan:     Chest pain; likely atypical versus ACS?  Known hx CAD; s/p CABG 2001, [facility] with PCI & stent to CBD on 4/21/2024.  Chronically elevated troponin (-146-177)  EKG negative for acute ischemic pathology  Cardiology consulted; echo ordered.  Continue aspirin, Plavix, Imdur, Ranexa, beta-blockers and statins.  N.p.o. after midnight pending intervention cardiology evaluation     Acute on chronic CHF  Elevated proBNP on admission. Echo on 4/30/24 showed EF of 30 -35%. Hypokinesis of the basal anteroseptal, inferoseptal, basal inferior Grade I diastolic dysfunction.    Cardiology input appreciated.  Continue beta-blockers, Imdur, hydralazine and Lasix.   Not on ACE/ARB/Arni or spironolactone due to renal dysfunction. strict I&O's, daily weights.  Fluid and salt restriction     Hx of ESRD; on PD at home  Stated he uses cycler every night but was unable to do PD last night because of chest pain  Nephrology consulted     Pressure wound stage II left lateral foot X-ray left foot showed soft tissue along the lateral aspect of foot suggestive of cellulitis.  No evidence of osteomyelitis.  Patient afebrile with normal  WBCs,  Podiatry on board; recommended mepilex placement daily to offload site and follow-up with podiatry outpatient    Diabetes mellitus; poorly controlled complicated with peripheral neuropathy: Last HgbA1c 9.1 on 4/17/2024.  Follows with endocrinology; on insulin pump.  Refusing SSI;stated that blood glucose levels are very brittle and would like to keep his insulin pump and managing himself.  Monitor BG closely     Hypertension; continue current regimen and monitor BP closely.     Hyperlipidemia; continue statins.     Hx seizure disorder; continue Keppra     Hx of mood disorder; on Zoloft        DVT PPX: s/c heparin   Code:Full Code    Disposition: Once acute medical issues have resolved    Current Medications  regadenoson (LEXISCAN) injection 0.4 mg, ONCE PRN  sodium chloride flush 0.9 % injection 5-40 mL, 2 times per day  sodium chloride flush 0.9 % injection 5-40 mL, PRN  0.9 % sodium chloride infusion, PRN  ondansetron (ZOFRAN-ODT) disintegrating tablet 4 mg, Q8H PRN   Or  ondansetron (ZOFRAN) injection 4 mg, Q6H PRN  acetaminophen (TYLENOL) tablet 650 mg, Q6H PRN   Or  acetaminophen (TYLENOL) suppository 650 mg, Q6H PRN  polyethylene glycol (GLYCOLAX) packet 17 g, Daily PRN  aspirin chewable tablet 81 mg, Daily  heparin (porcine) injection 5,000 Units, 3 times per day  dextrose bolus 10% 125 mL, PRN   Or  dextrose bolus 10% 250 mL, PRN  glucagon injection 1 mg, PRN  dextrose 10 % infusion, Continuous PRN  [Held by provider] insulin lispro (HUMALOG,ADMELOG) injection vial 0-8 Units, TID WC  [Held by provider] insulin lispro (HUMALOG,ADMELOG) injection vial 0-4 Units, Nightly  aspirin EC tablet 81 mg, Daily  clopidogrel (PLAVIX) tablet 75 mg, Daily  carvedilol (COREG) tablet 25 mg, BID  furosemide (LASIX) tablet 40 mg, BID  hydrALAZINE (APRESOLINE) tablet 50 mg, BID  isosorbide mononitrate (IMDUR) extended release tablet 60 mg, Daily  levETIRAcetam (KEPPRA) tablet 500 mg, BID  melatonin tablet 6 mg, Nightly

## 2024-06-24 NOTE — PROGRESS NOTES
[facility] [place] Daily Progress Note      Admit Date:  6/23/2024    Chief Complaint   Patient presents with    Chest Pain     Pt. With CP tonight- mid sternal. Arrived by [facility] squad and pt. Given 324 asa en route.         Subjective:  Mr. Garay denies exertional chest pain, SOB/MOISE, PND, palpitations, light-headedness, or edema.     Objective:   BP (!) 148/69   Pulse 86   Temp 97.2 °F (36.2 °C) (Temporal)   Resp 16   Ht 1.727 m (5' 8\")   Wt 83 kg (183 lb)   SpO2 97%   BMI 27.83 kg/m²     Intake/Output Summary (Last 24 hours) at 6/24/2024 1243  Last data filed at 6/23/2024 1700  Gross per 24 hour   Intake 480 ml   Output --   Net 480 ml       TELEMETRY: Sinus     Physical Exam:  General:  Awake, alert, oriented x 3, NAD  Skin:  Warm and dry  Neck:  JVD flat  Chest:  normal air entry  Cardiovascular:  RRR S1S2, no S3, no mrmr  Abdomen:  Soft, ND, NT, No HSM  Extremities:  No edema    Medications:    Insulin Pump - Bolus Dose   SubCUTAneous 4x Daily AC & HS    Insulin Pump - Basal Dose   SubCUTAneous Daily    sodium chloride flush  5-40 mL IntraVENous 2 times per day    aspirin  81 mg Oral Daily    heparin (porcine)  5,000 Units SubCUTAneous 3 times per day    clopidogrel  75 mg Oral Daily    carvedilol  25 mg Oral BID    furosemide  40 mg Oral BID    hydrALAZINE  50 mg Oral BID    isosorbide mononitrate  60 mg Oral Daily    levETIRAcetam  500 mg Oral BID    ranolazine  500 mg Oral BID    rosuvastatin  40 mg Oral Nightly    sertraline  50 mg Oral Daily    isosorbide mononitrate  30 mg Oral Nightly    gentamicin   Topical Daily      sodium chloride      dextrose       sodium chloride flush, sodium chloride, ondansetron **OR** ondansetron, acetaminophen **OR** acetaminophen, polyethylene glycol, dextrose bolus **OR** dextrose bolus, glucagon (rDNA), dextrose, melatonin, fentanNYL    Lab Data:  CBC:   Recent Labs     06/23/24  0403 06/24/24  0456   WBC 8.4 4.1   HGB 13.4* 12.2*   HCT 41.1 36.1*   MCV  90.0 89.6    120*     BMP:   Recent Labs     06/23/24  0403 06/24/24  0456   * 136   K 3.7 3.8   CL 95* 98*   CO2 23 25   BUN 54* 57*   CREATININE 12.0* 11.4*     LIVER PROFILE:   Recent Labs     06/23/24  0403 06/23/24  0513   AST 13*  --    ALT 11  --    LIPASE  --  16.0   BILITOT 0.4  --    ALKPHOS 97  --      PT/INR: No results for input(s): \"PROTIME\", \"INR\" in the last 72 hours.  APTT: No results for input(s): \"APTT\" in the last 72 hours.  BNP:  No results for input(s): \"BNP\" in the last 72 hours.  IMAGING: none    Assessment/Plan:  Principal Problem:  Principal Problem:  Atypical chest discomfort  - differential: ischemia, gastroparesis, volume overload, msk  - sharp, constant  - chronically elevated troponin in the setting of ESRD  -ecg nonischemic  -echo pending  -recent PCI to SVG. Only patent vessels and SVG to OM and LIMA to LAD.  -recommend stress myoview     2. CABG s/p cabg and recent PCI to SVG  - patient reports compliance with dapt  -stable  Plan  - continue coreg, imdur , ranolazine   - continue aspirin and clopidogrel   - needs better lipid  and bp control, add pcsk9 as outpatient.     3. Acute on chronic systolic heart failure  - acc c nyha III  - new problem  Plan  - patient has been compliant with betablocker, hydralazine/nitrates  - not on ace/arb/arni/spironolactone/sglt2 due to renal failure and multiple episodes of hyperkalemia   - if no improvement in EF needs ICD, scar in myocardium      4. Type I diabetic since age 15  - new problem  Plan  - evaluation for gastroparesis per primary team       If stress normal can discharge home.        Marcus Deleon MD, 6/24/2024 12:43 PM

## 2024-06-24 NOTE — CARE COORDINATION
Case Management Assessment  Initial Evaluation    Date/Time of Evaluation: 6/24/2024 3:25 PM  Assessment Completed by: Adriana Lemus    If patient is discharged prior to next notation, then this note serves as note for discharge by case management.    Patient Name: Te Garay                   YOB: 1965  Diagnosis: Hypertrophic scar of skin [L91.0]  Chest pain [R07.9]  Chest pain, unspecified type [R07.9]                   Date / Time: 6/23/2024  3:39 AM    Patient Admission Status: Inpatient   Readmission Risk (Low < 19, Mod (19-27), High > 27): Readmission Risk Score: 33.7    Current PCP: Reid Lei MD  PCP verified by CM? (P) Yes    Chart Reviewed: Yes      History Provided by: (P) Patient  Patient Orientation: (P) Alert and Oriented, Person, Place, Situation    Patient Cognition: (P) Alert    Hospitalization in the last 30 days (Readmission):  No    If yes, Readmission Assessment in  Navigator will be completed.    Advance Directives:      Code Status: Full Code   Patient's Primary Decision Maker is: (P) Legal Next of Kin      Discharge Planning:    Patient lives with: (P) Spouse/Significant Other Type of Home: (P) House  Primary Care Giver:    Patient Support Systems include: (P) Spouse/Significant Other, Children   Current Financial resources: (P) Medicare  Current community resources: (P) None  Current services prior to admission: (P) Durable Medical Equipment            Current DME: (P) Walker, Wheelchair, Cane (PD - [facility])            Type of Home Care services:  (P) None    ADLS  Prior functional level: (P) Independent in ADLs/IADLs  Current functional level: (P) Independent in ADLs/IADLs    PT AM-PAC:   /24  OT AM-PAC:   /24    Family can provide assistance at DC: (P) Yes  Would you like Case Management to discuss the discharge plan with any other family members/significant others, and if so, who? (P) No  Plans to Return to Present Housing: (P) Yes  Other Identified  Issues/Barriers to RETURNING to current housing: none  Potential Assistance needed at discharge: (P) N/A            Potential DME:    Patient expects to discharge to: (P) House  Plan for transportation at discharge: (P) Family    Financial    Payor: MEDICARE / Plan: MEDICARE PART A AND B / Product Type: *No Product type* /     Does insurance require precert for SNF: No    Potential assistance Purchasing Medications: (P) No  Meds-to-Beds request:        [facility] - [place], [place] - [address] - P [phone] - F [phone]  [address]  [address]  [facility] [address]  Phone: [phone] Fax: [phone]    [id number] - [place], [place] - [address] [facility] -  [phone] - F [phone] [address] [facility] [address]  Phone: [phone] Fax: [phone]    [facility] #[id number] [place], [place] - [address] -  [phone] - F [phone]  [address]  [facility] [address]  Phone: [phone] Fax: [phone]      Notes:    Factors facilitating achievement of predicted outcomes: Family support, Good insight into deficits, and Has needed Durable Medical Equipment at home    Barriers to discharge: Decreased endurance    Additional Case Management Notes:   CM to bedside for DCPA. Pt reports he lives at home with his wife and son.   Pt reports he is an active  and independent for ADL's. Pt reports has felt fatigued recently.   Pt reports he seeing [facility] team, and has about 10 doctor's, he is overwhelmed with doctor appointments, and trying to return to work.   Pt is active with PD [facility], Pt reports he does his own dialysis.   Pt reports his family can transport home.     Pt reports he would be agreeable to [facility] if it is needed. CM reports no order for evaluation by PT/OT but he feels he needs then a evaluation can be done.     CM found no needs at this time.     The Plan for Transition of Care is related to the

## 2024-06-24 NOTE — PROGRESS NOTES
Nephrology progress Note  [phone]  [phone]   [email or web address]       Patient:  Te Garay   : 1965    Brief HPI    59-year-old male with history of ESRD on PD, CAD/CABG/PCI, hypertension, DM 1, hyperlipidemia, seizure disorder presented with chest pain.  Apparently, chest pain woke him up from sleep and was associated with diaphoresis, nausea and emesis.  We were consulted for ESRD management  X-ray of the left foot showed soft tissue swelling suggestive of cellulitis but no evidence of osteomyelitis    Subjective/interval history  Patient seen and examined  Blood pressures controlled  Has been afebrile  On room air      Meds:  Scheduled Meds:   sodium chloride flush  5-40 mL IntraVENous 2 times per day    aspirin  81 mg Oral Daily    heparin (porcine)  5,000 Units SubCUTAneous 3 times per day    [Held by provider] insulin lispro  0-8 Units SubCUTAneous TID WC    [Held by provider] insulin lispro  0-4 Units SubCUTAneous Nightly    clopidogrel  75 mg Oral Daily    carvedilol  25 mg Oral BID    furosemide  40 mg Oral BID    hydrALAZINE  50 mg Oral BID    isosorbide mononitrate  60 mg Oral Daily    levETIRAcetam  500 mg Oral BID    ranolazine  500 mg Oral BID    rosuvastatin  40 mg Oral Nightly    sertraline  50 mg Oral Daily    isosorbide mononitrate  30 mg Oral Nightly    gentamicin   Topical Daily     Continuous Infusions:   sodium chloride      dextrose       PRN Meds:.regadenoson, sodium chloride flush, sodium chloride, ondansetron **OR** ondansetron, acetaminophen **OR** acetaminophen, polyethylene glycol, dextrose bolus **OR** dextrose bolus, glucagon (rDNA), dextrose, melatonin, fentanNYL      Vitals:  /64   Pulse 80   Temp 97.1 °F (36.2 °C)   Resp 16   Ht 1.727 m (5' 8\")   Wt 83.1 kg (183 lb 3.2 oz)   SpO2 97%   BMI 27.86 kg/m²     Physical Exam  General : AAOx3, not in pain or respiratory distress, resting in bed  HEENT : mucosa moist.  CVS: S1 S2 normal, regular rhythm, no

## 2024-06-24 NOTE — FLOWSHEET NOTE
06/24/24 1410   Vitals   Temp 97.3 °F (36.3 °C)   Temp Source Temporal   Pulse 89   Heart Rate Source Monitor   Respirations 18   /71   MAP (Calculated) 90   MAP (mmHg) 90   BP Location Right upper arm   BP Upper/Lower Upper   BP Method Automatic   Patient Position Semi fowlers   Cardiac Rhythm Sinus rhythm   Pain Assessment   Pain Assessment None - Denies Pain   Oxygen Therapy   SpO2 96 %   Pulse Oximetry Type Intermittent   Pulse Oximeter Device Mode Intermittent   Pulse Oximeter Device Location Finger   O2 Device None (Room air)     Patient back to room from Stress lab.

## 2024-06-24 NOTE — PLAN OF CARE
Problem: Neurosensory - Adult  Goal: Achieves stable or improved neurological status  Outcome: Progressing     Problem: Respiratory - Adult  Goal: Achieves optimal ventilation and oxygenation  Outcome: Progressing     Problem: Cardiovascular - Adult  Goal: Maintains optimal cardiac output and hemodynamic stability  Outcome: Progressing     Problem: Skin/Tissue Integrity - Adult  Goal: Skin integrity remains intact  Outcome: Progressing     Problem: Musculoskeletal - Adult  Goal: Return mobility to safest level of function  Outcome: Progressing     Problem: Gastrointestinal - Adult  Goal: Minimal or absence of nausea and vomiting  Outcome: Progressing     Problem: Infection - Adult  Goal: Absence of infection at discharge  Outcome: Progressing     Problem: Metabolic/Fluid and Electrolytes - Adult  Goal: Electrolytes maintained within normal limits  Outcome: Progressing     Problem: ABCDS Injury Assessment  Goal: Absence of physical injury  Outcome: Progressing     Problem: Pain  Goal: Verbalizes/displays adequate comfort level or baseline comfort level  Outcome: Progressing

## 2024-06-24 NOTE — PROGRESS NOTES
Spoke with patient nurse Adrianna SANCHEZ. Patient has been seen by podiatry who placed wound care ordered. Area is not currently open. Will cancel wound care consult due to patient being followed by podiatry. Veronica Maddox DNP, RN, CWON-AP

## 2024-06-25 LAB
ANION GAP SERPL CALCULATED.3IONS-SCNC: 14 MMOL/L (ref 3–16)
BUN SERPL-MCNC: 58 MG/DL (ref 7–20)
CALCIUM SERPL-MCNC: 8.9 MG/DL (ref 8.3–10.6)
CHLORIDE SERPL-SCNC: 95 MMOL/L (ref 99–110)
CO2 SERPL-SCNC: 26 MMOL/L (ref 21–32)
CREAT SERPL-MCNC: 11 MG/DL (ref 0.9–1.3)
EKG ATRIAL RATE: 75 BPM
EKG DIAGNOSIS: NORMAL
EKG P AXIS: 63 DEGREES
EKG P-R INTERVAL: 148 MS
EKG Q-T INTERVAL: 412 MS
EKG QRS DURATION: 106 MS
EKG QTC CALCULATION (BAZETT): 460 MS
EKG R AXIS: 35 DEGREES
EKG T AXIS: 69 DEGREES
EKG VENTRICULAR RATE: 75 BPM
GFR SERPLBLD CREATININE-BSD FMLA CKD-EPI: 5 ML/MIN/{1.73_M2}
GLUCOSE SERPL-MCNC: 164 MG/DL (ref 70–99)
POTASSIUM SERPL-SCNC: 3.8 MMOL/L (ref 3.5–5.1)
SODIUM SERPL-SCNC: 135 MMOL/L (ref 136–145)

## 2024-06-25 PROCEDURE — 6370000000 HC RX 637 (ALT 250 FOR IP): Performed by: INTERNAL MEDICINE

## 2024-06-25 PROCEDURE — 90945 DIALYSIS ONE EVALUATION: CPT

## 2024-06-25 PROCEDURE — 2580000003 HC RX 258: Performed by: INTERNAL MEDICINE

## 2024-06-25 PROCEDURE — 6370000000 HC RX 637 (ALT 250 FOR IP): Performed by: NURSE PRACTITIONER

## 2024-06-25 PROCEDURE — 2000000000 HC ICU R&B

## 2024-06-25 PROCEDURE — 80048 BASIC METABOLIC PNL TOTAL CA: CPT

## 2024-06-25 PROCEDURE — 99233 SBSQ HOSP IP/OBS HIGH 50: CPT | Performed by: INTERNAL MEDICINE

## 2024-06-25 PROCEDURE — 93010 ELECTROCARDIOGRAM REPORT: CPT | Performed by: INTERNAL MEDICINE

## 2024-06-25 PROCEDURE — 6360000002 HC RX W HCPCS: Performed by: INTERNAL MEDICINE

## 2024-06-25 RX ORDER — CALCIUM CARBONATE 500 MG/1
500 TABLET, CHEWABLE ORAL 3 TIMES DAILY PRN
Status: DISCONTINUED | OUTPATIENT
Start: 2024-06-25 | End: 2024-07-04 | Stop reason: HOSPADM

## 2024-06-25 RX ORDER — SODIUM CHLORIDE 0.9 % (FLUSH) 0.9 %
5-40 SYRINGE (ML) INJECTION PRN
Status: DISCONTINUED | OUTPATIENT
Start: 2024-06-25 | End: 2024-06-27 | Stop reason: HOSPADM

## 2024-06-25 RX ORDER — SODIUM CHLORIDE 9 MG/ML
INJECTION, SOLUTION INTRAVENOUS PRN
Status: DISCONTINUED | OUTPATIENT
Start: 2024-06-25 | End: 2024-06-27 | Stop reason: HOSPADM

## 2024-06-25 RX ORDER — SODIUM CHLORIDE 0.9 % (FLUSH) 0.9 %
5-40 SYRINGE (ML) INJECTION EVERY 12 HOURS SCHEDULED
Status: DISCONTINUED | OUTPATIENT
Start: 2024-06-25 | End: 2024-06-27 | Stop reason: HOSPADM

## 2024-06-25 RX ADMIN — CARVEDILOL 25 MG: 25 TABLET, FILM COATED ORAL at 09:30

## 2024-06-25 RX ADMIN — FUROSEMIDE 40 MG: 40 TABLET ORAL at 09:30

## 2024-06-25 RX ADMIN — HEPARIN SODIUM 5000 UNITS: 5000 INJECTION INTRAVENOUS; SUBCUTANEOUS at 21:30

## 2024-06-25 RX ADMIN — LEVETIRACETAM 500 MG: 500 TABLET, FILM COATED ORAL at 21:28

## 2024-06-25 RX ADMIN — SERTRALINE 50 MG: 50 TABLET, FILM COATED ORAL at 09:30

## 2024-06-25 RX ADMIN — SODIUM CHLORIDE, PRESERVATIVE FREE 10 ML: 5 INJECTION INTRAVENOUS at 21:30

## 2024-06-25 RX ADMIN — ISOSORBIDE MONONITRATE 60 MG: 60 TABLET, EXTENDED RELEASE ORAL at 09:30

## 2024-06-25 RX ADMIN — ISOSORBIDE MONONITRATE 30 MG: 30 TABLET, EXTENDED RELEASE ORAL at 21:28

## 2024-06-25 RX ADMIN — RANOLAZINE 500 MG: 500 TABLET, EXTENDED RELEASE ORAL at 21:27

## 2024-06-25 RX ADMIN — SODIUM CHLORIDE, PRESERVATIVE FREE 10 ML: 5 INJECTION INTRAVENOUS at 21:28

## 2024-06-25 RX ADMIN — HEPARIN SODIUM 5000 UNITS: 5000 INJECTION INTRAVENOUS; SUBCUTANEOUS at 05:37

## 2024-06-25 RX ADMIN — HEPARIN SODIUM 5000 UNITS: 5000 INJECTION INTRAVENOUS; SUBCUTANEOUS at 14:30

## 2024-06-25 RX ADMIN — ASPIRIN 81 MG 81 MG: 81 TABLET ORAL at 09:30

## 2024-06-25 RX ADMIN — GENTAMICIN SULFATE: 1 CREAM TOPICAL at 09:00

## 2024-06-25 RX ADMIN — CARVEDILOL 25 MG: 25 TABLET, FILM COATED ORAL at 21:28

## 2024-06-25 RX ADMIN — HYDRALAZINE HYDROCHLORIDE 50 MG: 25 TABLET ORAL at 21:27

## 2024-06-25 RX ADMIN — ANTACID TABLETS 500 MG: 500 TABLET, CHEWABLE ORAL at 02:34

## 2024-06-25 RX ADMIN — CLOPIDOGREL BISULFATE 75 MG: 75 TABLET ORAL at 09:30

## 2024-06-25 RX ADMIN — RANOLAZINE 500 MG: 500 TABLET, EXTENDED RELEASE ORAL at 09:30

## 2024-06-25 RX ADMIN — LEVETIRACETAM 500 MG: 500 TABLET, FILM COATED ORAL at 09:30

## 2024-06-25 RX ADMIN — ROSUVASTATIN 40 MG: 20 TABLET, FILM COATED ORAL at 21:28

## 2024-06-25 RX ADMIN — HYDRALAZINE HYDROCHLORIDE 50 MG: 25 TABLET ORAL at 09:30

## 2024-06-25 ASSESSMENT — PAIN SCALES - GENERAL: PAINLEVEL_OUTOF10: 0

## 2024-06-25 NOTE — PROGRESS NOTES
Nephrology progress Note  [phone]  [phone]   [employer]       Patient:  Te Garay   : 1965    Brief HPI    59-year-old male with history of ESRD on PD, CAD/CABG/PCI, hypertension, DM 1, hyperlipidemia, seizure disorder presented with chest pain.  Apparently, chest pain woke him up from sleep and was associated with diaphoresis, nausea and emesis.  We were consulted for ESRD management  X-ray of the left foot showed soft tissue swelling suggestive of cellulitis but no evidence of osteomyelitis    Subjective/interval history  Patient seen and examined  Blood pressures controlled  Has been afebrile  On room air  Denies chest pain or shortness of breath    Meds:  Scheduled Meds:   Insulin Pump - Bolus Dose   SubCUTAneous 4x Daily AC & HS    Insulin Pump - Basal Dose   SubCUTAneous Daily    sodium chloride flush  5-40 mL IntraVENous 2 times per day    aspirin  81 mg Oral Daily    heparin (porcine)  5,000 Units SubCUTAneous 3 times per day    clopidogrel  75 mg Oral Daily    carvedilol  25 mg Oral BID    furosemide  40 mg Oral BID    hydrALAZINE  50 mg Oral BID    isosorbide mononitrate  60 mg Oral Daily    levETIRAcetam  500 mg Oral BID    ranolazine  500 mg Oral BID    rosuvastatin  40 mg Oral Nightly    sertraline  50 mg Oral Daily    isosorbide mononitrate  30 mg Oral Nightly    gentamicin   Topical Daily     Continuous Infusions:   sodium chloride      dextrose       PRN Meds:.calcium carbonate, sodium chloride flush, sodium chloride, ondansetron **OR** ondansetron, acetaminophen **OR** acetaminophen, polyethylene glycol, dextrose bolus **OR** dextrose bolus, glucagon (rDNA), dextrose, melatonin, fentanNYL      Vitals:  BP (!) 120/90   Pulse 85   Temp 97.4 °F (36.3 °C) (Temporal)   Resp 18   Ht 1.727 m (5' 8\")   Wt 86.2 kg (190 lb 0.6 oz)   SpO2 98%   BMI 28.89 kg/m²     Physical Exam  General : AAOx3, not in pain or respiratory distress, resting in bed  HEENT : mucosa moist.  CVS: S1

## 2024-06-25 NOTE — PLAN OF CARE
Problem: Neurosensory - Adult  Goal: Achieves stable or improved neurological status  Outcome: Progressing   Awake,no def  Problem: Respiratory - Adult  Goal: Achieves optimal ventilation and oxygenation  Outcome: Progressing     Problem: Cardiovascular - Adult  Goal: Maintains optimal cardiac output and hemodynamic stability  Outcome: Progressing   For card cath in am   Aware of npo after mn  Problem: Neurosensory - Adult  Goal: Achieves stable or improved neurological status  Outcome: Progressing     Problem: Respiratory - Adult  Goal: Achieves optimal ventilation and oxygenation  Outcome: Progressing  RA     Problem: Cardiovascular - Adult  Goal: Maintains optimal cardiac output and hemodynamic stability  Outcome: Progressing   Hx of chf,cmp,cabg  On PD  Lasix given

## 2024-06-25 NOTE — PROGRESS NOTES
Disconnected from CCPD per protocol.  Effluent: Clear, no fibrin   Total time: 8 hr 50 min   Total UF:  434 ml.  Total Volume:  6893 ml.  Dwell time gained:  0 hr 10 min.  Pt Tolerated procedure: No alarms

## 2024-06-25 NOTE — PROGRESS NOTES
HOSPITALISTS PROGRESS NOTE    6/25/2024 8:56 AM        Name: Te Garay .              Admitted: 6/23/2024  Primary Care Provider: Reid Lei MD (Tel: [phone])      Brief Course: 59 y.o. male  with PMHx of CAD; s/p CABG/ recent PCI& stent HFpEF, ESRD on PD, hypertension, hyperlipidemia diabetes mellitus, and seizure disorder who presented with chest pain.  S/p cardiac stress test which was abnormal plan for [facility] tomorrow.        Interval history:   Pt seen and examined today.  Overnight events noted, interval ancillary notes and labs reviewed.   Resting in bed; reported he had recurrence of chest pain last night; denies any, palpitation, SOB, PND, orthopnea   Plan for cardiac cath tomorrow per cardiology; n.p.o. after midnight        Assessment & Plan:     Chest pain; likely ACS  Known hx CAD; s/p CABG 2001, [facility] with PCI & stent to [facility] on 4/21/2024.  Chronically elevated troponin (-146-177)  EKG negative for acute ischemic pathology  Cardiology on board; underwent cardiac stress test which was abnormal.  Plan for [facility] tomorrow  continue aspirin, Plavix, Imdur, Ranexa, beta-blockers and statins.     Acute on chronic CHF  Elevated proBNP on admission. Echo on 4/30/24 showed EF of 30 -35%. Hypokinesis of the basal anteroseptal, inferoseptal, basal inferior Grade I diastolic dysfunction.    Cardiology input appreciated.  Continue beta-blockers, Imdur, hydralazine and Lasix.   Not on ACE/ARB/Arni or spironolactone due to renal dysfunction. strict I&O's, daily weights.  Fluid and salt restriction     Hx of ESRD; on PD at home  Stated he uses cycler every night but was unable to do PD last night because of chest pain  Nephrology input appreciated     Pressure wound stage II left lateral foot X-ray left foot showed soft tissue along the lateral aspect of foot suggestive of cellulitis.  No evidence of osteomyelitis.  Patient afebrile

## 2024-06-25 NOTE — DIALYSIS
PD dressing changed. Site C/D/I. Last BM 6/22. Treatment initiated without complications or complaints from patient. Patient resting comfortably with VSS upon dialysis RN exit from room. Larry Vizcaino RN notified of start of treatment and hotline number located on top of machine for any questions about troubleshooting during after hours.

## 2024-06-25 NOTE — PROGRESS NOTES
CHF Nutrition Education    Pt triggered for heart failure diet education. Upon visiting, pt reported he is familiar with HF diet guidelines and believes he does well with limiting sodium and fluid intake. Denied need for verbal education at this time. Handout provided with this RD's name and number should pt have any questions regarding materials provided. Stated where he struggles is incorporating diets for multiple other disease states (DM, renal). Time spent with patient: 5 minutes      Electronically signed by Chelsie Quiroz MS, RD, LD on 6/25/2024 at 11:33 AM

## 2024-06-25 NOTE — PROGRESS NOTES
CCPD Order   Exchanges: 3    Exchange Volume: 2300 ml   Total Time: 8 hrs   Dextrose: 1.5%   Last Fill: 0 ml   Total Volume:6900 ml     Orders verified. Supplies taken to pt's room. Report received. Cycler set up, primed and pre tested. Dressing changed on [facility] Cath site. Pt connected to cycler. CCPD initiated without problem. Initial effluent clear.       If problems should arise please call the [phone] number on top of PD cycler machine.

## 2024-06-25 NOTE — DISCHARGE INSTRUCTIONS
Follow up with your PCP within 7-10 days of discharge.  Follow up with cardiology as instructed   Follow up with nephrology as instructed   Take all your medications as prescribed.      [address],   [place] [address]  Phone: [phone]  Fax: [phone]

## 2024-06-26 LAB
ANION GAP SERPL CALCULATED.3IONS-SCNC: 14 MMOL/L (ref 3–16)
BUN SERPL-MCNC: 57 MG/DL (ref 7–20)
CALCIUM SERPL-MCNC: 8.7 MG/DL (ref 8.3–10.6)
CHLORIDE SERPL-SCNC: 96 MMOL/L (ref 99–110)
CO2 SERPL-SCNC: 23 MMOL/L (ref 21–32)
CREAT SERPL-MCNC: 11.8 MG/DL (ref 0.9–1.3)
GFR SERPLBLD CREATININE-BSD FMLA CKD-EPI: 5 ML/MIN/{1.73_M2}
GLUCOSE SERPL-MCNC: 137 MG/DL (ref 70–99)
POTASSIUM SERPL-SCNC: 4 MMOL/L (ref 3.5–5.1)
SODIUM SERPL-SCNC: 133 MMOL/L (ref 136–145)

## 2024-06-26 PROCEDURE — 2000000000 HC ICU R&B

## 2024-06-26 PROCEDURE — 90935 HEMODIALYSIS ONE EVALUATION: CPT

## 2024-06-26 PROCEDURE — 6370000000 HC RX 637 (ALT 250 FOR IP): Performed by: INTERNAL MEDICINE

## 2024-06-26 PROCEDURE — 2580000003 HC RX 258: Performed by: INTERNAL MEDICINE

## 2024-06-26 PROCEDURE — 2580000003 HC RX 258: Performed by: STUDENT IN AN ORGANIZED HEALTH CARE EDUCATION/TRAINING PROGRAM

## 2024-06-26 PROCEDURE — 87205 SMEAR GRAM STAIN: CPT

## 2024-06-26 PROCEDURE — 6360000002 HC RX W HCPCS: Performed by: STUDENT IN AN ORGANIZED HEALTH CARE EDUCATION/TRAINING PROGRAM

## 2024-06-26 PROCEDURE — 99232 SBSQ HOSP IP/OBS MODERATE 35: CPT | Performed by: INTERNAL MEDICINE

## 2024-06-26 PROCEDURE — 80048 BASIC METABOLIC PNL TOTAL CA: CPT

## 2024-06-26 PROCEDURE — 87070 CULTURE OTHR SPECIMN AEROBIC: CPT

## 2024-06-26 PROCEDURE — 6360000002 HC RX W HCPCS: Performed by: INTERNAL MEDICINE

## 2024-06-26 RX ADMIN — HYDRALAZINE HYDROCHLORIDE 50 MG: 25 TABLET ORAL at 09:50

## 2024-06-26 RX ADMIN — HYDRALAZINE HYDROCHLORIDE 50 MG: 25 TABLET ORAL at 20:40

## 2024-06-26 RX ADMIN — CARVEDILOL 25 MG: 25 TABLET, FILM COATED ORAL at 20:40

## 2024-06-26 RX ADMIN — ISOSORBIDE MONONITRATE 60 MG: 60 TABLET, EXTENDED RELEASE ORAL at 09:50

## 2024-06-26 RX ADMIN — ISOSORBIDE MONONITRATE 30 MG: 30 TABLET, EXTENDED RELEASE ORAL at 20:40

## 2024-06-26 RX ADMIN — CLOPIDOGREL BISULFATE 75 MG: 75 TABLET ORAL at 09:50

## 2024-06-26 RX ADMIN — LEVETIRACETAM 500 MG: 500 TABLET, FILM COATED ORAL at 20:40

## 2024-06-26 RX ADMIN — PREDNISONE 50 MG: 20 TABLET ORAL at 20:39

## 2024-06-26 RX ADMIN — CARVEDILOL 25 MG: 25 TABLET, FILM COATED ORAL at 09:50

## 2024-06-26 RX ADMIN — SERTRALINE 50 MG: 50 TABLET, FILM COATED ORAL at 09:50

## 2024-06-26 RX ADMIN — RANOLAZINE 500 MG: 500 TABLET, EXTENDED RELEASE ORAL at 20:40

## 2024-06-26 RX ADMIN — GENTAMICIN SULFATE: 1 CREAM TOPICAL at 09:52

## 2024-06-26 RX ADMIN — WATER 1000 MG: 1 INJECTION INTRAMUSCULAR; INTRAVENOUS; SUBCUTANEOUS at 11:48

## 2024-06-26 RX ADMIN — RANOLAZINE 500 MG: 500 TABLET, EXTENDED RELEASE ORAL at 09:50

## 2024-06-26 RX ADMIN — PREDNISONE 50 MG: 20 TABLET ORAL at 13:51

## 2024-06-26 RX ADMIN — HEPARIN SODIUM 5000 UNITS: 5000 INJECTION INTRAVENOUS; SUBCUTANEOUS at 13:52

## 2024-06-26 RX ADMIN — ASPIRIN 81 MG 81 MG: 81 TABLET ORAL at 09:50

## 2024-06-26 RX ADMIN — FUROSEMIDE 40 MG: 40 TABLET ORAL at 17:33

## 2024-06-26 RX ADMIN — HEPARIN SODIUM 5000 UNITS: 5000 INJECTION INTRAVENOUS; SUBCUTANEOUS at 20:40

## 2024-06-26 RX ADMIN — ACETAMINOPHEN 650 MG: 325 TABLET ORAL at 20:39

## 2024-06-26 RX ADMIN — LEVETIRACETAM 500 MG: 500 TABLET, FILM COATED ORAL at 09:50

## 2024-06-26 RX ADMIN — SODIUM CHLORIDE, PRESERVATIVE FREE 10 ML: 5 INJECTION INTRAVENOUS at 09:51

## 2024-06-26 RX ADMIN — ROSUVASTATIN 40 MG: 20 TABLET, FILM COATED ORAL at 20:40

## 2024-06-26 ASSESSMENT — PAIN DESCRIPTION - ORIENTATION: ORIENTATION: LEFT

## 2024-06-26 ASSESSMENT — PAIN SCALES - GENERAL
PAINLEVEL_OUTOF10: 2
PAINLEVEL_OUTOF10: 4

## 2024-06-26 ASSESSMENT — PAIN DESCRIPTION - LOCATION: LOCATION: FOOT

## 2024-06-26 NOTE — PROGRESS NOTES
[facility] Daily Progress Note      Admit Date:  6/23/2024      Cardiology consult: chest pain    Subjective:  Mr. Garay  denies any chest pain or shortness of breath today, main complaint is foot pain..     Reports he woke with chest pain, his normal breathing techniques did not help this time, he was diaphoretic, nausea/vomiting. His wife called 911. His pain remained for about 45 minutes. Currently pain free. Similar to anginal symptoms, not as severe.     He felt pretty good after his stent in May.       History of present illness:   Te Garay is a 58 y.o. patient known to me who has a history of CAD, s/p CABG '01, s/p PCI '21, ESRD on PD, Ischemic CMP, dHF, Htn, Hld, carotid artery stenosis s/p rt carotid artery stenting, DM, CVA.  He is currently on the transplant list for kidney and pancreas and follows with .       Objective:   /62   Pulse 73   Temp 97.6 °F (36.4 °C) (Temporal)   Resp 16   Ht 1.727 m (5' 8\")   Wt 82.7 kg (182 lb 5.1 oz) Comment: drained  SpO2 100%   BMI 27.72 kg/m²     Intake/Output Summary (Last 24 hours) at 6/26/2024 1654  Last data filed at 6/26/2024 1409  Gross per 24 hour   Intake 240 ml   Output --   Net 240 ml         Physical Exam:  General:  Awake, alert, oriented x 3, NAD  Skin:  Warm and dry  Neck:  JVD normal  Chest:  normal air entry  Cardiovascular:  RRR S1S2, no S3,   Abdomen:  Soft, ND, NT, No HSM  Extremities:  No edema    Medications:    cefTRIAXone (ROCEPHIN) IV  1,000 mg IntraVENous Q24H    predniSONE  50 mg Oral q8h    sodium chloride flush  5-40 mL IntraVENous 2 times per day    Insulin Pump - Bolus Dose   SubCUTAneous 4x Daily AC & HS    Insulin Pump - Basal Dose   SubCUTAneous Daily    sodium chloride flush  5-40 mL IntraVENous 2 times per day    aspirin  81 mg Oral Daily    heparin (porcine)  5,000 Units SubCUTAneous 3 times per day    clopidogrel  75 mg Oral Daily    carvedilol  25 mg Oral BID    furosemide  40 mg Oral BID       MEDICATION RECOMMENDATIONS   Recommendation Rx Detail Reason Not Prescribed   ASA ASA 81mg PO QD     P2Y12 Plavix 75mg PO QDAY     HI-STATIN None Allergy   BETA BLOCKER Carvedilol      ACE/ARB/ARNI Entresto     ALDACTONE None Low BP     Cardiac cath 4/9/21:   Procedure:          LHC, PCI of SVG  Indication:          NSTEMI     Procedure Details:  Consent Access Bleed R Sedat Start Stop Versed Fentanyl Contrast Fluoro EBL Comp Spec   Yes RCFA high MCSFC 1456 1554 1.5 75 132 13.4 <20 None None   *Ultrasound Note: Ultrasound guidance used to determine aforementioned artery patency, size (>2mm), anatomic variations and ideal puncture location.  Real-time ultrasound utilized concurrent with vascular needle entry into the artery.  Image(s) permanently recorded and reported in the patient chart.  *Sedation Note: MCSFC = minimal conscious sedation for comfort     Findings:  Artery Findings/Result   LM Normal   % ostial   Cx 100% mid   % ostial   RCA Nondominant, Smaller RV marginal with mid 70% and distal 90% lesions supplying collateral to distal Cx (old finding), small but could potentially be stented if symptoms merit   S-OM Patent, 99% ostial with ABEL 1 flow   L-LAD patent   LVEDP 6   LVG 35%, inferior hk      Intervention:         PCI of SVG-OM1 with 3.5 X 23 Xience MARIIA (PD with 3.75NC throughout)     Post Cath Dx:       Severe disease as above, culprit SVG     Cardiac cath 7/29/19:  Procedures: LHC, Cor angio, SVG angio, LIMA angio     LM 20%  % ostium  Cx 100% prox. (prior cath had small OM1 visable that is now occluded)   RCA dominant, diffusely diseased w/ distal 90% in area artery is approx 1 mm in diameter. No sig change from 2017.  LIMA to LAD patent  SVG to OM2 40%  LVEDP 28 mmHg     Med mgmt  EP eval for NSVT, Afib  Resume AC. Heparin drip pending EP eval.   BP and fluid mgmt per nephrology   No ACE/ARB due to CKD  DAPT, statin, BBlk        Cardiac cath 7/7/17  ANGIOGRAPHIC FINDINGS:  1.

## 2024-06-26 NOTE — PROGRESS NOTES
Treatment time: 8 Hours 32 minutes  Net UF: 29 ml  Dwell Time: 25 minutes Gained    Treatment completed without complications or complaints from patient. Effluent clear and lines taped to patient per protocol. Patient resting comfortably with VSS upon exiting room.      Copy of dialysis treatment record placed in chart, to be scanned into EMR.

## 2024-06-26 NOTE — PLAN OF CARE
Problem: Neurosensory - Adult  Goal: Achieves stable or improved neurological status  Outcome: Progressing  Flowsheets (Taken 6/26/2024 0800)  Achieves stable or improved neurological status: Assess for and report changes in neurological status     Problem: Respiratory - Adult  Goal: Achieves optimal ventilation and oxygenation  Outcome: Progressing     Problem: Cardiovascular - Adult  Goal: Maintains optimal cardiac output and hemodynamic stability  Outcome: Progressing     Problem: Skin/Tissue Integrity - Adult  Goal: Skin integrity remains intact  Outcome: Progressing  Flowsheets (Taken 6/26/2024 0819)  Skin Integrity Remains Intact: Monitor for areas of redness and/or skin breakdown     Problem: Musculoskeletal - Adult  Goal: Return mobility to safest level of function  Outcome: Progressing  Flowsheets (Taken 6/26/2024 0800)  Return Mobility to Safest Level of Function: Assess patient stability and activity tolerance for standing, transferring and ambulating with or without assistive devices     Problem: Gastrointestinal - Adult  Goal: Minimal or absence of nausea and vomiting  Outcome: Progressing  Flowsheets (Taken 6/26/2024 0800)  Minimal or absence of nausea and vomiting: Administer IV fluids as ordered to ensure adequate hydration     Problem: Infection - Adult  Goal: Absence of infection at discharge  Outcome: Progressing  Flowsheets (Taken 6/26/2024 0800)  Absence of infection at discharge: Assess and monitor for signs and symptoms of infection     Problem: Metabolic/Fluid and Electrolytes - Adult  Goal: Electrolytes maintained within normal limits  Outcome: Progressing  Flowsheets (Taken 6/26/2024 0800)  Electrolytes maintained within normal limits: Monitor labs and assess patient for signs and symptoms of electrolyte imbalances     Problem: ABCDS Injury Assessment  Goal: Absence of physical injury  Outcome: Progressing  Flowsheets (Taken 6/26/2024 0819)  Absence of Physical Injury: Implement safety

## 2024-06-26 NOTE — PROGRESS NOTES
chest disease.         XR FOOT LEFT (MIN 3 VIEWS)   Final Result   1. Soft tissue swelling at the lateral aspect of the foot suggesting   cellulitis.   2. No evidence of osteomyelitis.   3. Chronic arthropathy at the Lisfranc joint most likely neurogenic   arthropathy.             Problem List  Principal Problem:    Chest pain  Resolved Problems:    * No resolved hospital problems. *       FLORENTIN JORDAN MD   6/25/2024 8:56 AM      Please note that some part of this chart was generated using Dragon dictation software. Although every effort was made to ensure the accuracy of this automated transcription, some errors in transcription may have occurred inadvertently. If you may need any clarification, please do not hesitate to contact me through EPIC.

## 2024-06-26 NOTE — CARE COORDINATION
[facility] Continence Nurse  Consult Note       NAME:  Te Garay  MEDICAL RECORD NUMBER:  [id number]  AGE: 59 y.o.   GENDER: male  : 1965  TODAY'S DATE:  2024    Subjective   Reason for WOCN Evaluation and Assessment: draining left foot wound      Te Garay is a 59 y.o. male referred by:   [x] Physician  [x] Nursing  [] Other:     Wound Identification:  Wound Type: diabetic  Contributing Factors: diabetes and charcot deformity    Wound History: Present on admission due to charcot deformity and diabetes. Reports sees a podiatrist to have the callus trimmed and is currently having drainage from the wound.   Current Wound Care Treatment:  Mepilex border and ace wrap    Patient Goal of Care:  [x] Wound Healing  [] Odor Control  [] Palliative Care  [x] Pain Control   [] Other:         PAST MEDICAL HISTORY        Diagnosis Date    Angina at rest ([facility])     Cardiomyopathy ([facility])     Carotid artery stenosis 10/25/2016    SUZANNA stented with 8 x 30 mm non-tapered Xact stent    CHF (congestive heart failure) ([facility]) 2015    EF 30%     CKD (chronic kidney disease) stage 2, GFR 60-89 ml/min     Coronary artery disease     sp CABG UC    Diabetic peripheral neuropathy ([facility])     Diastolic HF (heart failure) ([facility])     Hyperlipidemia with target LDL less than 70     Hypertension goal BP (blood pressure) < 130/80     PVD (peripheral vascular disease) ([facility])     Seizures ([facility])     in childhood    Type 1 diabetes mellitus with chronic kidney disease ([facility])     c-peptide <0.1 in        PAST SURGICAL HISTORY    Past Surgical History:   Procedure Laterality Date    BLADDER SURGERY Left 2023    CYSTOSCOPY, LEFT URETEROSCOPY, STONE BASKET MANIPULATION, PLACEMENT OF LEFT URETERAL STENT performed by Chevy Sepulveda MD at [facility] OR    CARDIAC CATHETERIZATION      CARDIAC SURGERY      triple bypass at     CAROTID STENT PLACEMENT Right     CORONARY ARTERY BYPASS GRAFT      FOOT DEBRIDEMENT Left  Foot Left;Lateral-Dressing/Treatment: Betadine swabs/povidone iodine, Foam, Ace wrap    Specialty Bed Required : No   [] Low Air Loss   [] Pressure Redistribution  [] Fluid Immersion  [] Bariatric  [] Total Pressure Relief  [] Other:     Current Diet: Diet NPO Exceptions are: Sips of Water with Meds  Dietician consult:  Yes    Discharge Plan:  Placement for patient upon discharge: home with support    Patient appropriate for Outpatient Wound Care Center: No Follows with Dr. Lowe already.    Referrals:  []   [] Home Health Care  [] Supplies  [] Other    Patient/Caregiver Teaching:  Level of patient/caregiver understanding able to:   [] Indicates understanding       [] Needs reinforcement  [] Unsuccessful      [x] Verbal Understanding  [] Demonstrated understanding       [] No evidence of learning  [] Refused teaching         [] N/A       Electronically signed by Veronica Maddox RN, CWON-AP on 6/26/2024 at 11:04 AM

## 2024-06-27 PROBLEM — I20.0 WORSENING ANGINA (HCC): Status: ACTIVE | Noted: 2024-06-23

## 2024-06-27 PROBLEM — R94.39 ABNORMAL STRESS TEST: Status: ACTIVE | Noted: 2024-06-23

## 2024-06-27 LAB
ANION GAP SERPL CALCULATED.3IONS-SCNC: 17 MMOL/L (ref 3–16)
ANTI-XA UNFRAC HEPARIN: 0.91 IU/ML (ref 0.3–0.7)
APTT BLD: 123.3 SEC (ref 22.1–36.4)
BUN SERPL-MCNC: 62 MG/DL (ref 7–20)
CALCIUM SERPL-MCNC: 8.7 MG/DL (ref 8.3–10.6)
CHLORIDE SERPL-SCNC: 89 MMOL/L (ref 99–110)
CO2 SERPL-SCNC: 23 MMOL/L (ref 21–32)
CREAT SERPL-MCNC: 11.3 MG/DL (ref 0.9–1.3)
DEPRECATED RDW RBC AUTO: 14.6 % (ref 12.4–15.4)
ECHO BSA: 2 M2
EKG ATRIAL RATE: 96 BPM
EKG DIAGNOSIS: NORMAL
EKG P AXIS: 62 DEGREES
EKG P-R INTERVAL: 146 MS
EKG Q-T INTERVAL: 390 MS
EKG QRS DURATION: 110 MS
EKG QTC CALCULATION (BAZETT): 492 MS
EKG R AXIS: 57 DEGREES
EKG T AXIS: 76 DEGREES
EKG VENTRICULAR RATE: 96 BPM
GFR SERPLBLD CREATININE-BSD FMLA CKD-EPI: 5 ML/MIN/{1.73_M2}
GLUCOSE BLD-MCNC: 266 MG/DL (ref 70–99)
GLUCOSE BLD-MCNC: 272 MG/DL (ref 70–99)
GLUCOSE BLD-MCNC: 290 MG/DL (ref 70–99)
GLUCOSE BLD-MCNC: 312 MG/DL (ref 70–99)
GLUCOSE BLD-MCNC: 487 MG/DL (ref 70–99)
GLUCOSE SERPL-MCNC: 362 MG/DL (ref 70–99)
HCT VFR BLD AUTO: 40.1 % (ref 40.5–52.5)
HGB BLD-MCNC: 12.9 G/DL (ref 13.5–17.5)
INR PPP: 1.06 (ref 0.85–1.15)
MCH RBC QN AUTO: 29.4 PG (ref 26–34)
MCHC RBC AUTO-ENTMCNC: 32.3 G/DL (ref 31–36)
MCV RBC AUTO: 91 FL (ref 80–100)
PERFORMED ON: ABNORMAL
PLATELET # BLD AUTO: 169 K/UL (ref 135–450)
PMV BLD AUTO: 10.6 FL (ref 5–10.5)
POTASSIUM SERPL-SCNC: 4.8 MMOL/L (ref 3.5–5.1)
PROTHROMBIN TIME: 14.1 SEC (ref 11.9–14.9)
RBC # BLD AUTO: 4.41 M/UL (ref 4.2–5.9)
SODIUM SERPL-SCNC: 129 MMOL/L (ref 136–145)
TROPONIN, HIGH SENSITIVITY: 150 NG/L (ref 0–22)
WBC # BLD AUTO: 6.1 K/UL (ref 4–11)

## 2024-06-27 PROCEDURE — 4A023N8 MEASUREMENT OF CARDIAC SAMPLING AND PRESSURE, BILATERAL, PERCUTANEOUS APPROACH: ICD-10-PCS | Performed by: STUDENT IN AN ORGANIZED HEALTH CARE EDUCATION/TRAINING PROGRAM

## 2024-06-27 PROCEDURE — 93010 ELECTROCARDIOGRAM REPORT: CPT | Performed by: INTERNAL MEDICINE

## 2024-06-27 PROCEDURE — C1894 INTRO/SHEATH, NON-LASER: HCPCS | Performed by: STUDENT IN AN ORGANIZED HEALTH CARE EDUCATION/TRAINING PROGRAM

## 2024-06-27 PROCEDURE — 6360000002 HC RX W HCPCS: Performed by: INTERNAL MEDICINE

## 2024-06-27 PROCEDURE — 2580000003 HC RX 258: Performed by: STUDENT IN AN ORGANIZED HEALTH CARE EDUCATION/TRAINING PROGRAM

## 2024-06-27 PROCEDURE — 2500000003 HC RX 250 WO HCPCS

## 2024-06-27 PROCEDURE — 93005 ELECTROCARDIOGRAM TRACING: CPT | Performed by: INTERNAL MEDICINE

## 2024-06-27 PROCEDURE — 2500000003 HC RX 250 WO HCPCS: Performed by: STUDENT IN AN ORGANIZED HEALTH CARE EDUCATION/TRAINING PROGRAM

## 2024-06-27 PROCEDURE — 6370000000 HC RX 637 (ALT 250 FOR IP): Performed by: INTERNAL MEDICINE

## 2024-06-27 PROCEDURE — 2580000003 HC RX 258: Performed by: INTERNAL MEDICINE

## 2024-06-27 PROCEDURE — 93458 L HRT ARTERY/VENTRICLE ANGIO: CPT

## 2024-06-27 PROCEDURE — 93458 L HRT ARTERY/VENTRICLE ANGIO: CPT | Performed by: STUDENT IN AN ORGANIZED HEALTH CARE EDUCATION/TRAINING PROGRAM

## 2024-06-27 PROCEDURE — 6360000002 HC RX W HCPCS: Performed by: STUDENT IN AN ORGANIZED HEALTH CARE EDUCATION/TRAINING PROGRAM

## 2024-06-27 PROCEDURE — C1760 CLOSURE DEV, VASC: HCPCS | Performed by: STUDENT IN AN ORGANIZED HEALTH CARE EDUCATION/TRAINING PROGRAM

## 2024-06-27 PROCEDURE — 90935 HEMODIALYSIS ONE EVALUATION: CPT

## 2024-06-27 PROCEDURE — 90945 DIALYSIS ONE EVALUATION: CPT

## 2024-06-27 PROCEDURE — 2709999900 HC NON-CHARGEABLE SUPPLY: Performed by: STUDENT IN AN ORGANIZED HEALTH CARE EDUCATION/TRAINING PROGRAM

## 2024-06-27 PROCEDURE — 6360000002 HC RX W HCPCS

## 2024-06-27 PROCEDURE — 84484 ASSAY OF TROPONIN QUANT: CPT

## 2024-06-27 PROCEDURE — 80048 BASIC METABOLIC PNL TOTAL CA: CPT

## 2024-06-27 PROCEDURE — 6360000004 HC RX CONTRAST MEDICATION: Performed by: STUDENT IN AN ORGANIZED HEALTH CARE EDUCATION/TRAINING PROGRAM

## 2024-06-27 PROCEDURE — 94760 N-INVAS EAR/PLS OXIMETRY 1: CPT

## 2024-06-27 PROCEDURE — C1769 GUIDE WIRE: HCPCS | Performed by: STUDENT IN AN ORGANIZED HEALTH CARE EDUCATION/TRAINING PROGRAM

## 2024-06-27 PROCEDURE — 2000000000 HC ICU R&B

## 2024-06-27 PROCEDURE — 6370000000 HC RX 637 (ALT 250 FOR IP): Performed by: STUDENT IN AN ORGANIZED HEALTH CARE EDUCATION/TRAINING PROGRAM

## 2024-06-27 PROCEDURE — 93459 L HRT ART/GRFT ANGIO: CPT | Performed by: STUDENT IN AN ORGANIZED HEALTH CARE EDUCATION/TRAINING PROGRAM

## 2024-06-27 PROCEDURE — 36415 COLL VENOUS BLD VENIPUNCTURE: CPT

## 2024-06-27 PROCEDURE — 85730 THROMBOPLASTIN TIME PARTIAL: CPT

## 2024-06-27 PROCEDURE — B2131ZZ FLUOROSCOPY OF MULTIPLE CORONARY ARTERY BYPASS GRAFTS USING LOW OSMOLAR CONTRAST: ICD-10-PCS | Performed by: STUDENT IN AN ORGANIZED HEALTH CARE EDUCATION/TRAINING PROGRAM

## 2024-06-27 PROCEDURE — B2111ZZ FLUOROSCOPY OF MULTIPLE CORONARY ARTERIES USING LOW OSMOLAR CONTRAST: ICD-10-PCS | Performed by: STUDENT IN AN ORGANIZED HEALTH CARE EDUCATION/TRAINING PROGRAM

## 2024-06-27 PROCEDURE — 85520 HEPARIN ASSAY: CPT

## 2024-06-27 PROCEDURE — 85027 COMPLETE CBC AUTOMATED: CPT

## 2024-06-27 PROCEDURE — B2151ZZ FLUOROSCOPY OF LEFT HEART USING LOW OSMOLAR CONTRAST: ICD-10-PCS | Performed by: STUDENT IN AN ORGANIZED HEALTH CARE EDUCATION/TRAINING PROGRAM

## 2024-06-27 PROCEDURE — 85610 PROTHROMBIN TIME: CPT

## 2024-06-27 PROCEDURE — 99152 MOD SED SAME PHYS/QHP 5/>YRS: CPT | Performed by: STUDENT IN AN ORGANIZED HEALTH CARE EDUCATION/TRAINING PROGRAM

## 2024-06-27 PROCEDURE — 99153 MOD SED SAME PHYS/QHP EA: CPT | Performed by: STUDENT IN AN ORGANIZED HEALTH CARE EDUCATION/TRAINING PROGRAM

## 2024-06-27 RX ORDER — HEPARIN SODIUM 1000 [USP'U]/ML
4000 INJECTION, SOLUTION INTRAVENOUS; SUBCUTANEOUS ONCE
Status: COMPLETED | OUTPATIENT
Start: 2024-06-27 | End: 2024-06-27

## 2024-06-27 RX ORDER — NITROGLYCERIN 20 MG/100ML
5-200 INJECTION INTRAVENOUS CONTINUOUS
Status: DISCONTINUED | OUTPATIENT
Start: 2024-06-27 | End: 2024-06-29

## 2024-06-27 RX ORDER — HYDROMORPHONE HYDROCHLORIDE 1 MG/ML
0.25 INJECTION, SOLUTION INTRAMUSCULAR; INTRAVENOUS; SUBCUTANEOUS
Status: DISCONTINUED | OUTPATIENT
Start: 2024-06-27 | End: 2024-07-04 | Stop reason: HOSPADM

## 2024-06-27 RX ORDER — HEPARIN SODIUM 1000 [USP'U]/ML
2000 INJECTION, SOLUTION INTRAVENOUS; SUBCUTANEOUS PRN
Status: DISCONTINUED | OUTPATIENT
Start: 2024-06-27 | End: 2024-06-28

## 2024-06-27 RX ORDER — HYDROMORPHONE HYDROCHLORIDE 1 MG/ML
0.5 INJECTION, SOLUTION INTRAMUSCULAR; INTRAVENOUS; SUBCUTANEOUS
Status: DISCONTINUED | OUTPATIENT
Start: 2024-06-27 | End: 2024-07-04 | Stop reason: HOSPADM

## 2024-06-27 RX ORDER — MIDAZOLAM HYDROCHLORIDE 1 MG/ML
INJECTION INTRAMUSCULAR; INTRAVENOUS PRN
Status: DISCONTINUED | OUTPATIENT
Start: 2024-06-27 | End: 2024-06-27 | Stop reason: HOSPADM

## 2024-06-27 RX ORDER — HEPARIN SODIUM 10000 [USP'U]/100ML
5-30 INJECTION, SOLUTION INTRAVENOUS CONTINUOUS
Status: DISCONTINUED | OUTPATIENT
Start: 2024-06-27 | End: 2024-06-28

## 2024-06-27 RX ORDER — HEPARIN SODIUM 1000 [USP'U]/ML
4000 INJECTION, SOLUTION INTRAVENOUS; SUBCUTANEOUS PRN
Status: DISCONTINUED | OUTPATIENT
Start: 2024-06-27 | End: 2024-06-28

## 2024-06-27 RX ORDER — HYDRALAZINE HYDROCHLORIDE 20 MG/ML
INJECTION INTRAMUSCULAR; INTRAVENOUS PRN
Status: DISCONTINUED | OUTPATIENT
Start: 2024-06-27 | End: 2024-06-27 | Stop reason: HOSPADM

## 2024-06-27 RX ORDER — DIPHENHYDRAMINE HYDROCHLORIDE 50 MG/ML
INJECTION INTRAMUSCULAR; INTRAVENOUS PRN
Status: DISCONTINUED | OUTPATIENT
Start: 2024-06-27 | End: 2024-06-27 | Stop reason: HOSPADM

## 2024-06-27 RX ORDER — FENTANYL CITRATE 50 UG/ML
INJECTION, SOLUTION INTRAMUSCULAR; INTRAVENOUS PRN
Status: DISCONTINUED | OUTPATIENT
Start: 2024-06-27 | End: 2024-06-27 | Stop reason: HOSPADM

## 2024-06-27 RX ORDER — ASPIRIN 81 MG/1
TABLET, CHEWABLE ORAL PRN
Status: DISCONTINUED | OUTPATIENT
Start: 2024-06-27 | End: 2024-06-27 | Stop reason: HOSPADM

## 2024-06-27 RX ORDER — CLOPIDOGREL BISULFATE 75 MG/1
TABLET ORAL PRN
Status: DISCONTINUED | OUTPATIENT
Start: 2024-06-27 | End: 2024-06-27 | Stop reason: HOSPADM

## 2024-06-27 RX ORDER — NITROGLYCERIN 0.4 MG/1
0.4 TABLET SUBLINGUAL EVERY 5 MIN PRN
Status: DISCONTINUED | OUTPATIENT
Start: 2024-06-27 | End: 2024-07-04 | Stop reason: HOSPADM

## 2024-06-27 RX ADMIN — RANOLAZINE 500 MG: 500 TABLET, EXTENDED RELEASE ORAL at 09:53

## 2024-06-27 RX ADMIN — HEPARIN SODIUM 4000 UNITS: 1000 INJECTION INTRAVENOUS; SUBCUTANEOUS at 04:22

## 2024-06-27 RX ADMIN — PREDNISONE 50 MG: 20 TABLET ORAL at 04:14

## 2024-06-27 RX ADMIN — RANOLAZINE 500 MG: 500 TABLET, EXTENDED RELEASE ORAL at 20:32

## 2024-06-27 RX ADMIN — GENTAMICIN SULFATE: 1 CREAM TOPICAL at 09:54

## 2024-06-27 RX ADMIN — FUROSEMIDE 40 MG: 40 TABLET ORAL at 18:26

## 2024-06-27 RX ADMIN — ISOSORBIDE MONONITRATE 60 MG: 60 TABLET, EXTENDED RELEASE ORAL at 09:53

## 2024-06-27 RX ADMIN — FENTANYL CITRATE 25 MCG: 50 INJECTION INTRAMUSCULAR; INTRAVENOUS at 03:23

## 2024-06-27 RX ADMIN — ROSUVASTATIN 40 MG: 20 TABLET, FILM COATED ORAL at 20:32

## 2024-06-27 RX ADMIN — SERTRALINE 50 MG: 50 TABLET, FILM COATED ORAL at 09:53

## 2024-06-27 RX ADMIN — NITROGLYCERIN 30 MCG/MIN: 20 INJECTION INTRAVENOUS at 19:48

## 2024-06-27 RX ADMIN — CARVEDILOL 25 MG: 25 TABLET, FILM COATED ORAL at 09:53

## 2024-06-27 RX ADMIN — HYDROMORPHONE HYDROCHLORIDE 0.5 MG: 1 INJECTION, SOLUTION INTRAMUSCULAR; INTRAVENOUS; SUBCUTANEOUS at 10:04

## 2024-06-27 RX ADMIN — HYDRALAZINE HYDROCHLORIDE 50 MG: 25 TABLET ORAL at 20:31

## 2024-06-27 RX ADMIN — MELATONIN TAB 3 MG 6 MG: 3 TAB at 23:41

## 2024-06-27 RX ADMIN — SODIUM CHLORIDE, PRESERVATIVE FREE 10 ML: 5 INJECTION INTRAVENOUS at 20:37

## 2024-06-27 RX ADMIN — LEVETIRACETAM 500 MG: 500 TABLET, FILM COATED ORAL at 20:32

## 2024-06-27 RX ADMIN — NITROGLYCERIN 0.4 MG: 0.4 TABLET SUBLINGUAL at 03:46

## 2024-06-27 RX ADMIN — CARVEDILOL 25 MG: 25 TABLET, FILM COATED ORAL at 20:32

## 2024-06-27 RX ADMIN — WATER 1000 MG: 1 INJECTION INTRAMUSCULAR; INTRAVENOUS; SUBCUTANEOUS at 15:48

## 2024-06-27 RX ADMIN — FUROSEMIDE 40 MG: 40 TABLET ORAL at 09:53

## 2024-06-27 RX ADMIN — NITROGLYCERIN 10 MCG/MIN: 20 INJECTION INTRAVENOUS at 05:30

## 2024-06-27 RX ADMIN — GENTAMICIN SULFATE: 1 CREAM TOPICAL at 18:38

## 2024-06-27 RX ADMIN — SODIUM CHLORIDE, PRESERVATIVE FREE 10 ML: 5 INJECTION INTRAVENOUS at 09:55

## 2024-06-27 RX ADMIN — ISOSORBIDE MONONITRATE 30 MG: 30 TABLET, EXTENDED RELEASE ORAL at 20:32

## 2024-06-27 RX ADMIN — LEVETIRACETAM 500 MG: 500 TABLET, FILM COATED ORAL at 09:53

## 2024-06-27 RX ADMIN — HYDRALAZINE HYDROCHLORIDE 50 MG: 25 TABLET ORAL at 09:53

## 2024-06-27 RX ADMIN — FENTANYL CITRATE 25 MCG: 50 INJECTION INTRAMUSCULAR; INTRAVENOUS at 09:50

## 2024-06-27 RX ADMIN — HEPARIN SODIUM 12 UNITS/KG/HR: 10000 INJECTION, SOLUTION INTRAVENOUS at 04:19

## 2024-06-27 ASSESSMENT — PAIN DESCRIPTION - ORIENTATION
ORIENTATION: LEFT
ORIENTATION: MID

## 2024-06-27 ASSESSMENT — PAIN DESCRIPTION - LOCATION
LOCATION: CHEST

## 2024-06-27 ASSESSMENT — PAIN DESCRIPTION - DESCRIPTORS
DESCRIPTORS: STABBING
DESCRIPTORS: CRUSHING;TIGHTNESS

## 2024-06-27 ASSESSMENT — PAIN SCALES - GENERAL
PAINLEVEL_OUTOF10: 10
PAINLEVEL_OUTOF10: 6
PAINLEVEL_OUTOF10: 10
PAINLEVEL_OUTOF10: 3
PAINLEVEL_OUTOF10: 6

## 2024-06-27 NOTE — PROGRESS NOTES
Intervention called back  Start NTG and Heparin gtts at this time for chest pain relief    Electronically signed by Promise Lombardo RN on 6/27/2024 at 3:57 AM

## 2024-06-27 NOTE — PROGRESS NOTES
Pt called out and stated the chest pain was starting to return.  NTG gtt started, after 5 minutes pt stated the pain was going away.  Will continue to monitor

## 2024-06-27 NOTE — PROGRESS NOTES
Pt called reporting increased chest pain. Pain 6/10. Pt states this happens more when blood  sugar is high. BG- 312    Electronically signed by Promise Lombardo RN on 6/27/2024 at 3:15 AM

## 2024-06-27 NOTE — PROGRESS NOTES
Patient brought over to CVU from cath lab and attached to CVU monitoring.  Report reviewed with cath lab RN.  R groin soft and no hematoma or oozing noted. Site closed with mynx.  Occlusive dressing dry and intact.  Pedal pulses easily palpated.  Patient writhing in pain, crying and bearing down. MD notified. PRN dilaudid ordered. 2L O2 placed.

## 2024-06-27 NOTE — PROGRESS NOTES
New IV placed in LFA, morning labs sent  Cardiology called regarding increased chest pain    3:38 AM  06/27/24

## 2024-06-27 NOTE — PLAN OF CARE
Problem: Cardiovascular - Adult  Goal: Maintains optimal cardiac output and hemodynamic stability  6/26/2024 2053 by Miriam Tompkins, RN  Outcome: Progressing   Heart rate and rhythm, peripheral pulses, and cap refill assessed with assessment. General color and body temperature monitored throughout shift and with vitals. Assess for edema with head to toe assessment. Administer treatments and medications as ordered. Monitor patient's weight.    Problem: Skin/Tissue Integrity - Adult  Goal: Skin integrity remains intact  6/26/2024 2053 by Miriam Tompkins, RN  Outcome: Progressing   At risk for skin breakdown. See Paul scale. Turn and reposition every two hours and as needed. Heels elevated off bed. Protective barrier placed as needed. Patient kept clean and dry. Pillows used for positioning. Will continue to monitor for skin breakdown    Problem: ABCDS Injury Assessment  Goal: Absence of physical injury  6/26/2024 2053 by Miriam Tompkins, RN  Outcome: Progressing   Pt remains free from falls. Call light is within reach. Bed is in lowest position with brake on. Pt wearing non-skid footwear while ambulating.  Toileting offered Q2H and as needed. Walkways in room remains free from clutter. Belongings within reach.     Problem: Pain  Goal: Verbalizes/displays adequate comfort level or baseline comfort level  6/26/2024 2053 by Miriam Tompkins, RN  Outcome: Progressing   Patient able to verbalize pain on a 0-10 scale, discussed pain management options, both pharmacologic and non-pharmacologic. Patient verbalized understanding. Will continue to monitor.

## 2024-06-27 NOTE — FLOWSHEET NOTE
06/27/24 0800   Vital Signs   BP (!) 143/71   Temp 98 °F (36.7 °C)   Pulse 81   Respirations 16     Disconnected from CCPD per protocol.    Effluent: Clear yellow without fibrin    Total time: 8:09  Total UF:  460 ml.  Total Volume:  6900 ml.  Dwell time gained:  00:08  Pt Tolerated procedure: Without difficulty.    Report given to: Mahi Quiroz

## 2024-06-27 NOTE — PROGRESS NOTES
PRN pain medication administered. Increased nitro gtt. Patient continues to have 10/10 chest pain. MD aware.

## 2024-06-27 NOTE — PROGRESS NOTES
Nephrology progress Note  [phone]  [phone]   "[employer]       Patient:  Te Garay   : 1965    Brief HPI    59-year-old male with history of ESRD on PD, CAD/CABG/PCI, hypertension, DM 1, hyperlipidemia, seizure disorder presented with chest pain.  Apparently, chest pain woke him up from sleep and was associated with diaphoresis, nausea and emesis.  We were consulted for ESRD management  X-ray of the left foot showed soft tissue swelling suggestive of cellulitis but no evidence of osteomyelitis    Subjective/interval history  Patient seen and examined  Had a cardiac cath earlier today  Still under sedation  Blood pressures not very well-controlled, now on NTG gtt.      Meds:  Scheduled Meds:   cefTRIAXone (ROCEPHIN) IV  1,000 mg IntraVENous Q24H    Insulin Pump - Bolus Dose   SubCUTAneous 4x Daily AC & HS    Insulin Pump - Basal Dose   SubCUTAneous Daily    sodium chloride flush  5-40 mL IntraVENous 2 times per day    aspirin  81 mg Oral Daily    clopidogrel  75 mg Oral Daily    carvedilol  25 mg Oral BID    furosemide  40 mg Oral BID    hydrALAZINE  50 mg Oral BID    isosorbide mononitrate  60 mg Oral Daily    levETIRAcetam  500 mg Oral BID    ranolazine  500 mg Oral BID    rosuvastatin  40 mg Oral Nightly    sertraline  50 mg Oral Daily    isosorbide mononitrate  30 mg Oral Nightly    gentamicin   Topical Daily     Continuous Infusions:   heparin (PORCINE) Infusion Stopped (24 0831)    nitroGLYCERIN 50 mcg/min (24 1032)    sodium chloride      dextrose       PRN Meds:.nitroGLYCERIN, heparin (porcine), heparin (porcine), HYDROmorphone **OR** HYDROmorphone, calcium carbonate, sodium chloride flush, sodium chloride, ondansetron **OR** ondansetron, acetaminophen **OR** acetaminophen, polyethylene glycol, dextrose bolus **OR** dextrose bolus, glucagon (rDNA), dextrose, melatonin      Vitals:  BP (!) 185/87   Pulse (!) 103   Temp 98 °F (36.7 °C) (Temporal)   Resp 18   Ht 1.727 m

## 2024-06-27 NOTE — CARE COORDINATION
Pt had cardiac cath.     CM is still following for discharge plan and needs.     Radha Browning, RN, BSN  [phone]

## 2024-06-27 NOTE — FLOWSHEET NOTE
06/27/24 1833   Vital Signs   Temp 97.3 °F (36.3 °C)   Pulse 90   Respirations 16   BP (!) 147/80     Connected to Cycler per protocol.    CCPD order:                       Total Volume: 6.9 L                       Therapy Time Duration (Hours): 08:00                        Exchange Volume: 2.3 L                        Number of Exchanges: 3                        No Final Fill    Dwell Time: 00:20  Fill Time: 00:15  Drain Time: 00:25    Initial drain 153 ml    Effluent clear without fibrin.    Exit site care done per protocol.  Site without redness/drainage, clean/dry.      Garamycin .1% cream to site.  DSD/occlusive to site.     Report given to Becca Quiroz RN

## 2024-06-27 NOTE — PROGRESS NOTES
HOSPITALISTS PROGRESS NOTE    6/27/2024 8:39 AM        Name: Te Garay .              Admitted: 6/23/2024  Primary Care Provider: Reid Lei MD (Tel: [phone])      Brief Course: 59 y.o. male  with PMHx of CAD; s/p CABG/ recent PCI& stent HFpEF, ESRD on PD, hypertension, hyperlipidemia diabetes mellitus, and seizure disorder who presented with chest pain.  S/p cardiac stress test which was abnormal plan for [facility] tomorrow.        Interval history:   Pt seen and examined today.  Overnight events noted, interval ancillary notes and labs reviewed.   Resting in bed he had recurrence of chest pain last night not responding to  sl nitro, started on ngt and heparin gtt. Feels better this am   Plan for cardiac cath today  Lt leg wound bandaged , dry today    Assessment & Plan:   Chest pain?NSTEMI  Known hx CAD; s/p CABG 2001, [facility] with PCI & stent to CBD on 4/21/2024.  Chronically elevated troponin (-146-177)  EKG negative for acute ischemic pathology  Cont iv heparin and iv nitroglycerin until [facility], monitor BP   Cardiology on board; underwent cardiac stress test which was abnormal.  Plan for [facility] today  continue aspirin, Plavix, Imdur, Ranexa, beta-blockers and statins.     Acute on chronic CHF  Elevated proBNP on admission. Echo on 4/30/24 showed EF of 30 -35%. Hypokinesis of the basal anteroseptal, inferoseptal, basal inferior Grade I diastolic dysfunction.    Cardiology input appreciated.  Continue beta-blockers, Imdur, hydralazine and Lasix.   Not on ACE/ARB/Arni or spironolactone due to renal dysfunction. strict I&O's, daily weights.  Fluid and salt restriction       Infected Pressure wound stage II left lateral foot X-ray left foot showed soft tissue along the lateral aspect of foot suggestive of cellulitis.  No evidence of osteomyelitis.    Patient afebrile with normal WBCs, but has large amt of purulent discharge. Obtain wound cx  appropriate affect  Skin: Warm, dry, normal turgor    Labs and Tests:  CBC:   Recent Labs     06/27/24  0338   WBC 6.1   HGB 12.9*        BMP:    Recent Labs     06/25/24  0657 06/26/24  0415 06/27/24  0338   * 133* 129*   K 3.8 4.0 4.8   CL 95* 96* 89*   CO2 26 23 23   BUN 58* 57* 62*   CREATININE 11.0* 11.8* 11.3*   GLUCOSE 164* 137* 362*     Hepatic:   No results for input(s): \"AST\", \"ALT\", \"BILITOT\", \"ALKPHOS\" in the last 72 hours.    Invalid input(s): \"ALB\"  XR CHEST (2 VW)   Final Result   There is no evidence of acute chest disease.         XR FOOT LEFT (MIN 3 VIEWS)   Final Result   1. Soft tissue swelling at the lateral aspect of the foot suggesting   cellulitis.   2. No evidence of osteomyelitis.   3. Chronic arthropathy at the Lisfranc joint most likely neurogenic   arthropathy.             Problem List  Principal Problem:    Chest pain  Active Problems:    Abnormal stress test    Worsening angina (HCC)  Resolved Problems:    * No resolved hospital problems. *       Levi Gonzalez MD   6/27/2024 8:39 AM      Please note that some part of this chart was generated using Dragon dictation software. Although every effort was made to ensure the accuracy of this automated transcription, some errors in transcription may have occurred inadvertently. If you may need any clarification, please do not hesitate to contact me through EPIC.

## 2024-06-27 NOTE — PROGRESS NOTES
Heparin bolus given and gtt started.  Pt reports no longer in pain.  Will start NTG gtt if pain returns.

## 2024-06-28 LAB
ANION GAP SERPL CALCULATED.3IONS-SCNC: 16 MMOL/L (ref 3–16)
ANTI-XA UNFRAC HEPARIN: 0.13 IU/ML (ref 0.3–0.7)
ANTI-XA UNFRAC HEPARIN: 0.44 IU/ML (ref 0.3–0.7)
BACTERIA SPEC AEROBE CULT: ABNORMAL
BUN SERPL-MCNC: 68 MG/DL (ref 7–20)
CALCIUM SERPL-MCNC: 8 MG/DL (ref 8.3–10.6)
CHLORIDE SERPL-SCNC: 89 MMOL/L (ref 99–110)
CO2 SERPL-SCNC: 23 MMOL/L (ref 21–32)
CREAT SERPL-MCNC: 11.7 MG/DL (ref 0.9–1.3)
DEPRECATED RDW RBC AUTO: 14.5 % (ref 12.4–15.4)
GFR SERPLBLD CREATININE-BSD FMLA CKD-EPI: 5 ML/MIN/{1.73_M2}
GLUCOSE BLD-MCNC: 129 MG/DL (ref 70–99)
GLUCOSE BLD-MCNC: 151 MG/DL (ref 70–99)
GLUCOSE BLD-MCNC: 424 MG/DL (ref 70–99)
GLUCOSE BLD-MCNC: 463 MG/DL (ref 70–99)
GLUCOSE BLD-MCNC: 463 MG/DL (ref 70–99)
GLUCOSE SERPL-MCNC: 466 MG/DL (ref 70–99)
GRAM STN SPEC: ABNORMAL
HCT VFR BLD AUTO: 33.6 % (ref 40.5–52.5)
HGB BLD-MCNC: 11.2 G/DL (ref 13.5–17.5)
MCH RBC QN AUTO: 29.8 PG (ref 26–34)
MCHC RBC AUTO-ENTMCNC: 33.3 G/DL (ref 31–36)
MCV RBC AUTO: 89.4 FL (ref 80–100)
PERFORMED ON: ABNORMAL
PLATELET # BLD AUTO: 159 K/UL (ref 135–450)
PMV BLD AUTO: 10.5 FL (ref 5–10.5)
POTASSIUM SERPL-SCNC: 4.6 MMOL/L (ref 3.5–5.1)
RBC # BLD AUTO: 3.76 M/UL (ref 4.2–5.9)
SODIUM SERPL-SCNC: 128 MMOL/L (ref 136–145)
WBC # BLD AUTO: 12.7 K/UL (ref 4–11)

## 2024-06-28 PROCEDURE — 2000000000 HC ICU R&B

## 2024-06-28 PROCEDURE — 2580000003 HC RX 258: Performed by: INTERNAL MEDICINE

## 2024-06-28 PROCEDURE — 2580000003 HC RX 258: Performed by: STUDENT IN AN ORGANIZED HEALTH CARE EDUCATION/TRAINING PROGRAM

## 2024-06-28 PROCEDURE — 99233 SBSQ HOSP IP/OBS HIGH 50: CPT | Performed by: STUDENT IN AN ORGANIZED HEALTH CARE EDUCATION/TRAINING PROGRAM

## 2024-06-28 PROCEDURE — 80048 BASIC METABOLIC PNL TOTAL CA: CPT

## 2024-06-28 PROCEDURE — 6360000002 HC RX W HCPCS: Performed by: INTERNAL MEDICINE

## 2024-06-28 PROCEDURE — 6360000002 HC RX W HCPCS: Performed by: STUDENT IN AN ORGANIZED HEALTH CARE EDUCATION/TRAINING PROGRAM

## 2024-06-28 PROCEDURE — 6370000000 HC RX 637 (ALT 250 FOR IP): Performed by: STUDENT IN AN ORGANIZED HEALTH CARE EDUCATION/TRAINING PROGRAM

## 2024-06-28 PROCEDURE — 6370000000 HC RX 637 (ALT 250 FOR IP): Performed by: INTERNAL MEDICINE

## 2024-06-28 PROCEDURE — 85520 HEPARIN ASSAY: CPT

## 2024-06-28 PROCEDURE — 85027 COMPLETE CBC AUTOMATED: CPT

## 2024-06-28 PROCEDURE — 94760 N-INVAS EAR/PLS OXIMETRY 1: CPT

## 2024-06-28 RX ORDER — HEPARIN SODIUM 5000 [USP'U]/ML
5000 INJECTION, SOLUTION INTRAVENOUS; SUBCUTANEOUS EVERY 8 HOURS SCHEDULED
Status: DISCONTINUED | OUTPATIENT
Start: 2024-06-28 | End: 2024-07-04 | Stop reason: HOSPADM

## 2024-06-28 RX ADMIN — HEPARIN SODIUM 5000 UNITS: 5000 INJECTION INTRAVENOUS; SUBCUTANEOUS at 21:58

## 2024-06-28 RX ADMIN — CARVEDILOL 25 MG: 25 TABLET, FILM COATED ORAL at 09:37

## 2024-06-28 RX ADMIN — RANOLAZINE 500 MG: 500 TABLET, EXTENDED RELEASE ORAL at 09:37

## 2024-06-28 RX ADMIN — SODIUM CHLORIDE, PRESERVATIVE FREE 10 ML: 5 INJECTION INTRAVENOUS at 20:38

## 2024-06-28 RX ADMIN — HEPARIN SODIUM 2000 UNITS: 1000 INJECTION INTRAVENOUS; SUBCUTANEOUS at 00:09

## 2024-06-28 RX ADMIN — ASPIRIN 81 MG 81 MG: 81 TABLET ORAL at 09:37

## 2024-06-28 RX ADMIN — GENTAMICIN SULFATE: 1 CREAM TOPICAL at 09:38

## 2024-06-28 RX ADMIN — SODIUM CHLORIDE, PRESERVATIVE FREE 10 ML: 5 INJECTION INTRAVENOUS at 20:39

## 2024-06-28 RX ADMIN — ISOSORBIDE MONONITRATE 60 MG: 60 TABLET, EXTENDED RELEASE ORAL at 09:37

## 2024-06-28 RX ADMIN — SACUBITRIL AND VALSARTAN 1 TABLET: 49; 51 TABLET, FILM COATED ORAL at 20:32

## 2024-06-28 RX ADMIN — CARVEDILOL 25 MG: 25 TABLET, FILM COATED ORAL at 20:33

## 2024-06-28 RX ADMIN — LEVETIRACETAM 500 MG: 500 TABLET, FILM COATED ORAL at 09:37

## 2024-06-28 RX ADMIN — SODIUM CHLORIDE, PRESERVATIVE FREE 10 ML: 5 INJECTION INTRAVENOUS at 09:38

## 2024-06-28 RX ADMIN — LEVETIRACETAM 500 MG: 500 TABLET, FILM COATED ORAL at 20:33

## 2024-06-28 RX ADMIN — HYDRALAZINE HYDROCHLORIDE 50 MG: 25 TABLET ORAL at 09:37

## 2024-06-28 RX ADMIN — CLOPIDOGREL BISULFATE 75 MG: 75 TABLET ORAL at 09:38

## 2024-06-28 RX ADMIN — SERTRALINE 50 MG: 50 TABLET, FILM COATED ORAL at 09:37

## 2024-06-28 RX ADMIN — WATER 1000 MG: 1 INJECTION INTRAMUSCULAR; INTRAVENOUS; SUBCUTANEOUS at 09:38

## 2024-06-28 RX ADMIN — ROSUVASTATIN 40 MG: 20 TABLET, FILM COATED ORAL at 20:33

## 2024-06-28 RX ADMIN — FUROSEMIDE 40 MG: 40 TABLET ORAL at 17:26

## 2024-06-28 RX ADMIN — RANOLAZINE 500 MG: 500 TABLET, EXTENDED RELEASE ORAL at 20:33

## 2024-06-28 RX ADMIN — MELATONIN TAB 3 MG 6 MG: 3 TAB at 23:13

## 2024-06-28 RX ADMIN — FUROSEMIDE 40 MG: 40 TABLET ORAL at 09:37

## 2024-06-28 ASSESSMENT — PAIN SCALES - GENERAL
PAINLEVEL_OUTOF10: 0
PAINLEVEL_OUTOF10: 0

## 2024-06-28 NOTE — PROGRESS NOTES
Pt stated that they woke up at that their insulin pump was dislodged. They have an emergency kit at bedside and checked blood sugar and administered 5 units of insulin. Patients blood sugar is shown below. Pt states that wife is bringing the attachment to hook insulin pump back up first thing this morning. Will continue to provide care.    Latest Reference Range & Units 06/28/24 05:27   POC Glucose 70 - 99 mg/dl 463 (H)   (H): Data is abnormally high

## 2024-06-28 NOTE — PROGRESS NOTES
Clinical Pharmacy Note    HF Core Measures Assessment    Latest EF: 30-35%    Entresto/ACE/ARB (EF<40%): Entresto 49-51 mg tablets - 1 tablet by mouth twice daily  Evidence based BB (bisoprolol, metoprolol XL, carvedilol) (EF<40%): carvedilol 25 mg twice daily  Aldosterone Antagonist (EF<40%): No - ESRD on PD  SGLT2 (HFpEF/HFmrEF/HFrEF): No, contraindicated, ESRD on PD    (May consider the use of hydralazine + nitrate in patients intolerant to ACEI/ARB)    Kellie Simms, PharmD, BCPS  Clinical Pharmacist  [id number]

## 2024-06-28 NOTE — PROGRESS NOTES
HOSPITALISTS PROGRESS NOTE    6/28/2024 4:04 PM        Name: Te Garay .              Admitted: 6/23/2024  Primary Care Provider: Reid Lei MD (Tel: [phone])      Brief Course: 59 y.o. male  with PMHx of CAD; s/p CABG/ recent PCI& stent HFpEF, ESRD on PD, hypertension, hyperlipidemia diabetes mellitus, and seizure disorder who presented with chest pain.  S/p cardiac stress test which was abnormal plan for [facility] tomorrow.        Interval history:   Pt seen and examined today.  Overnight events noted, interval ancillary notes and labs reviewed.   Resting in bed he had recurrence of chest pain last night not responding to  sl nitro, started on ngt and heparin gtt. Feels better this am     Assessment & Plan:   Chest pain?NSTEMI  Known hx CAD; s/p CABG 2001, [facility] with PCI & stent to CBD on 4/21/2024.  Chronically elevated troponin (-146-177)  EKG negative for acute ischemic pathology  underwent cardiac stress test which was abnormal.  [facility] t 6/27 show  90 % ISR o SVG-OM1  continue aspirin, Plavix, Imdur, Ranexa, beta-blockers and statins.     Acute on chronic CHF  Elevated proBNP on admission. Echo on 4/30/24 showed EF of 30 -35%. Hypokinesis of the basal anteroseptal, inferoseptal, basal inferior Grade I diastolic dysfunction.    Cardiology input appreciated.  Continue beta-blockers, Imdur, hydralazine and Lasix.   Not on ACE/ARB/Arni or spironolactone due to renal dysfunction. strict I&O's, daily weights.  Fluid and salt restriction       Infected Pressure wound stage II left lateral foot X-ray left foot showed soft tissue along the lateral aspect of foot suggestive of cellulitis.  No evidence of osteomyelitis.    Patient afebrile with normal WBCs, but has large amt of purulent discharge. Obtain wound cx   Start iv rocephin  Podiatry on board; recommended mepilex placement daily to offload site and follow-up with podiatry

## 2024-06-28 NOTE — PROGRESS NOTES
Disconnected from CCPD per protocol.  Effluent: clear   Total time: 8 hr 36 min   Total UF:  +583 ml.  Total Volume:  6885 ml.  Dwell time gained:  0 hr 36 min.  Pt Tolerated procedure: no alarms

## 2024-06-28 NOTE — PLAN OF CARE
Patient has not reported any pain throughout the night. Nitro gtt off. Patient rested comfortably overnight.   Problem: Respiratory - Adult  Goal: Achieves optimal ventilation and oxygenation  Outcome: Progressing     Problem: Skin/Tissue Integrity - Adult  Goal: Skin integrity remains intact  Outcome: Progressing     Problem: Musculoskeletal - Adult  Goal: Return mobility to safest level of function  Outcome: Progressing     Problem: Infection - Adult  Goal: Absence of infection at discharge  Outcome: Progressing     Problem: Metabolic/Fluid and Electrolytes - Adult  Goal: Electrolytes maintained within normal limits  Outcome: Progressing     Problem: ABCDS Injury Assessment  Goal: Absence of physical injury  Outcome: Progressing     Problem: Pain  Goal: Verbalizes/displays adequate comfort level or baseline comfort level  Outcome: Progressing     Problem: Chronic Conditions and Co-morbidities  Goal: Patient's chronic conditions and co-morbidity symptoms are monitored and maintained or improved  Outcome: Progressing

## 2024-06-28 NOTE — PROGRESS NOTES
Nephrology progress Note  [phone]  [phone]   [employer]       Patient:  Te Garay   : 1965    Brief HPI    59-year-old male with history of ESRD on PD, CAD/CABG/PCI, hypertension, DM 1, hyperlipidemia, seizure disorder presented with chest pain.  Apparently, chest pain woke him up from sleep and was associated with diaphoresis, nausea and emesis.  We were consulted for ESRD management  X-ray of the left foot showed soft tissue swelling suggestive of cellulitis but no evidence of osteomyelitis    Subjective/interval history  Patient seen and examined  Had cardiac cath yesterday  No complaints at this time  Noted notes from dialysis RN  Off NTG gtt  Blood pressures fairly controlled  Has been afebrile  On room air    Meds:  Scheduled Meds:   sacubitril-valsartan  1 tablet Oral BID    empagliflozin  10 mg Oral Daily    cefTRIAXone (ROCEPHIN) IV  1,000 mg IntraVENous Q24H    Insulin Pump - Bolus Dose   SubCUTAneous 4x Daily AC & HS    Insulin Pump - Basal Dose   SubCUTAneous Daily    sodium chloride flush  5-40 mL IntraVENous 2 times per day    aspirin  81 mg Oral Daily    clopidogrel  75 mg Oral Daily    carvedilol  25 mg Oral BID    furosemide  40 mg Oral BID    levETIRAcetam  500 mg Oral BID    ranolazine  500 mg Oral BID    rosuvastatin  40 mg Oral Nightly    sertraline  50 mg Oral Daily    gentamicin   Topical Daily     Continuous Infusions:   nitroGLYCERIN Stopped (24 2308)    sodium chloride      dextrose       PRN Meds:.nitroGLYCERIN, HYDROmorphone **OR** HYDROmorphone, calcium carbonate, sodium chloride flush, sodium chloride, ondansetron **OR** ondansetron, acetaminophen **OR** acetaminophen, polyethylene glycol, dextrose bolus **OR** dextrose bolus, glucagon (rDNA), dextrose, melatonin      Vitals:  BP (!) 130/52   Pulse 86   Temp 97.4 °F (36.3 °C) (Temporal)   Resp 16   Ht 1.727 m (5' 8\")   Wt 84.7 kg (186 lb 11.7 oz)   SpO2 100%   BMI 28.39 kg/m²     Physical  LAD ostium, recommended aggressive medical management by cardiology    Hypertension fairly controlled  Postcardiac cath  Off NTG gtt.  Continue current regimen      Halima Neumann MD  6/28/2024  Office Phone : [phone]    Thank you for allowing us to participate in the care of this pt. I willcontinue to follow along. Please call with questions or concerns.

## 2024-06-28 NOTE — PROGRESS NOTES
Latest Reference Range & Units 06/28/24 03:13   Anti-XA Unfrac Heparin 0.30 - 0.70 IU/mL 0.44   Did not bolus or titrate heparin gtt. Will continue to provide care.

## 2024-06-28 NOTE — PROGRESS NOTES
Patient stated that their insulin pump delivered 5.5 units of insulin. Will continue to provide care.

## 2024-06-28 NOTE — CONSULTS
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 Chart reviewed. Previous imaging reviewed.     MRI of L foot ordered to further evaluate for deep infection.   Podiatry will follow up after MRI.    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    Recommendations  -c/w decadron + rad per rad onc  -f/u path for definitive dx, CTAP appreciated  -family to discuss GOC, will likely c/w treatment here  -palliative eval for outpatient pain management + follow up   Impression:    -Pathologic L1 vertebrae fracture  -Conus Medullaris Syndrome  -Epithelial carcinoma on path (FNA)    Recommendations  -family to discuss GOC, will likely c/w treatment here  -palliative eval for outpatient pain management + follow up  -c/w decadron + rad per rad onc  -f/u path for definitive dx, CTAP appreciated  -f/u ca 19-9, ca 15,3, cea  -consider XR Femur given pain  -can do bone scan + MR w/wo abilio on outpt basis     Impression:    Pathologic L1 vertebrae fracture  Conus Medullaris Syndrome  Epithelial carcinoma on path (FNA)  -  MR Lumbar Spine No Cont (01.27.24) Malignant compression deformity of L1 with significant bowing of the posterior cortex contributing to mass effect upon the conus. Of note there is significant dilation of the urinary bladder. Correlate clinically for neurogenic bladder. Additional degenerative changes in combination with lumbar levoscoliosis contributes to neuroforaminal narrowing most significantly involving the left L4-L5 and L5-S1 levels.  -  CT Chest w/ IV Cont (01.30.24) Cardiomegaly. CHF. Right-sided pleural effusion.Nonspecific right upper lobe pleural-based opacity.  - labs overall nonrevealing   - no hx of smoking, no family hx of malignancy     Recommendations:  - palliative eval for outpatient pain management + follow up  - c/w decadron + RT per Radiation Oncology   - f/u path for definitive diagnosis   - f/u ca 19-9, ca 15.3, cea, and ca125  - consider XR  left Femur givens severe pain on exam  - please order NM bone scan to assess for other kofi lesions  - MR w/wo abilio of brain prior to discharge      Impression:    Pathologic L1 vertebrae fracture  Conus Medullaris Syndrome  Epithelial carcinoma on path (FNA)  -  MR Lumbar Spine No Cont (01.27.24) Malignant compression deformity of L1 with significant bowing of the posterior cortex contributing to mass effect upon the conus. Of note there is significant dilation of the urinary bladder. Correlate clinically for neurogenic bladder. Additional degenerative changes in combination with lumbar levoscoliosis contributes to neuroforaminal narrowing most significantly involving the left L4-L5 and L5-S1 levels.  -  CT Chest w/ IV Cont (01.30.24) Cardiomegaly. CHF. Right-sided pleural effusion.Nonspecific right upper lobe pleural-based opacity.  - labs overall nonrevealing   - no hx of smoking, no family hx of malignancy     Recommendations:  - palliative eval for outpatient pain management + follow up  - c/w decadron + RT per Radiation Oncology   - f/u path for definitive diagnosis   - f/u ca 19-9, ca 15.3, cea, and ca125  - consider XR  left Femur givens severe pain on exam  - please order NM bone scan to assess for other kofi lesions  - MR w/wo abilio of brain prior to discharge   - check MM panel- SPEP, serum immunofixation, and free light chains

## 2024-06-28 NOTE — PROGRESS NOTES
PRN  acetaminophen (TYLENOL) tablet 650 mg, Q6H PRN   Or  acetaminophen (TYLENOL) suppository 650 mg, Q6H PRN  polyethylene glycol (GLYCOLAX) packet 17 g, Daily PRN  aspirin chewable tablet 81 mg, Daily  dextrose bolus 10% 125 mL, PRN   Or  dextrose bolus 10% 250 mL, PRN  glucagon injection 1 mg, PRN  dextrose 10 % infusion, Continuous PRN  clopidogrel (PLAVIX) tablet 75 mg, Daily  carvedilol (COREG) tablet 25 mg, BID  furosemide (LASIX) tablet 40 mg, BID  hydrALAZINE (APRESOLINE) tablet 50 mg, BID  isosorbide mononitrate (IMDUR) extended release tablet 60 mg, Daily  levETIRAcetam (KEPPRA) tablet 500 mg, BID  melatonin tablet 6 mg, Nightly PRN  ranolazine (RANEXA) extended release tablet 500 mg, BID  rosuvastatin (CRESTOR) tablet 40 mg, Nightly  sertraline (ZOLOFT) tablet 50 mg, Daily  isosorbide mononitrate (IMDUR) extended release tablet 30 mg, Nightly  gentamicin (GARAMYCIN) 0.1 % cream, Daily        Objective:  BP (!) 154/61   Pulse 85   Temp 97.6 °F (36.4 °C) (Temporal)   Resp 18   Ht 1.727 m (5' 8\")   Wt 84.7 kg (186 lb 11.7 oz)   SpO2 100%   BMI 28.39 kg/m²     Intake/Output Summary (Last 24 hours) at 6/28/2024 0731  Last data filed at 6/28/2024 0015  Gross per 24 hour   Intake 473.95 ml   Output 10 ml   Net 463.95 ml      Wt Readings from Last 3 Encounters:   06/28/24 84.7 kg (186 lb 11.7 oz)   05/02/24 88.5 kg (195 lb 1.7 oz)   04/21/24 91.6 kg (202 lb)       Physical Examination:   General appearance:  No apparent distress, appears stated age and cooperative.  HEENT: Normocephalic, sclera clear., PERRLA.  Trachea midline, no adenopathy.  Cardiovascular: Regular rate and rhythm, normal S1, S2. No murmur.   Respiratory:Clear to auscultation bilaterally, no wheeze or crackles.   GI: Abdomen soft, no tenderness, not distended, normal bowel sounds  Musculoskeletal: No cyanosis in digits.  Lt foot lat wound with foul smelling purulent discharge mixed with blood.  Neurology: CN 2-12 grossly intact. No

## 2024-06-28 NOTE — PROGRESS NOTES
[facility]    Admit Date: 2024    PCP: Reid Lei MD                  : 1965  MRN: [id number]    Subjective:   Interval History:   Patient seen and examined. Clinical notes reviewed.   Telemetry reviewed. No new complaint today.   No major events overnight.   Denies having chest pain, shortness of breath, dyspnea on exertion, Orthopnea, PND at the time of this visit.    Review of System:  Pertinent positive and negatives are in the HPI and interval above, the rest are negative.     Data:   Scheduled Meds:   sacubitril-valsartan  1 tablet Oral BID    heparin (porcine)  5,000 Units SubCUTAneous 3 times per day    cefTRIAXone (ROCEPHIN) IV  1,000 mg IntraVENous Q24H    Insulin Pump - Bolus Dose   SubCUTAneous 4x Daily AC & HS    Insulin Pump - Basal Dose   SubCUTAneous Daily    sodium chloride flush  5-40 mL IntraVENous 2 times per day    aspirin  81 mg Oral Daily    clopidogrel  75 mg Oral Daily    carvedilol  25 mg Oral BID    furosemide  40 mg Oral BID    levETIRAcetam  500 mg Oral BID    ranolazine  500 mg Oral BID    rosuvastatin  40 mg Oral Nightly    sertraline  50 mg Oral Daily    gentamicin   Topical Daily     PRN Meds:nitroGLYCERIN, HYDROmorphone **OR** HYDROmorphone, calcium carbonate, sodium chloride flush, sodium chloride, ondansetron **OR** ondansetron, acetaminophen **OR** acetaminophen, polyethylene glycol, dextrose bolus **OR** dextrose bolus, glucagon (rDNA), dextrose, melatonin  I/O last 3 completed shifts:  In: 474 [P.O.:120; I.V.:354]  Out: 10 [Blood:10]  I/O this shift:  In: 480 [P.O.:480]  Out: -     Intake/Output Summary (Last 24 hours) at 2024 1615  Last data filed at 2024 0948  Gross per 24 hour   Intake 953.95 ml   Output no documentation   Net 953.95 ml           Objective:     Vitals: Blood Pressure 100/61   Pulse 81   Temperature 97.6 °F (36.4 °C) (Temporal)   Respiration 16   Height 1.727 m (5' 8\")   Weight 84.7 kg (186 lb 11.7 oz)   Oxygen

## 2024-06-29 ENCOUNTER — APPOINTMENT (OUTPATIENT)
Dept: MRI IMAGING | Age: 59
End: 2024-06-29
Payer: MEDICARE

## 2024-06-29 PROBLEM — N18.6 TYPE 1 DIABETES MELLITUS WITH CHRONIC KIDNEY DISEASE ON CHRONIC DIALYSIS (HCC): Status: ACTIVE | Noted: 2024-04-16

## 2024-06-29 PROBLEM — E10.22 TYPE 1 DIABETES MELLITUS WITH CHRONIC KIDNEY DISEASE ON CHRONIC DIALYSIS (HCC): Status: ACTIVE | Noted: 2024-04-16

## 2024-06-29 PROBLEM — Z99.2 TYPE 1 DIABETES MELLITUS WITH CHRONIC KIDNEY DISEASE ON CHRONIC DIALYSIS (HCC): Status: ACTIVE | Noted: 2024-04-16

## 2024-06-29 LAB
ANION GAP SERPL CALCULATED.3IONS-SCNC: 17 MMOL/L (ref 3–16)
BUN SERPL-MCNC: 79 MG/DL (ref 7–20)
CALCIUM SERPL-MCNC: 8.1 MG/DL (ref 8.3–10.6)
CHLORIDE SERPL-SCNC: 92 MMOL/L (ref 99–110)
CO2 SERPL-SCNC: 23 MMOL/L (ref 21–32)
CREAT SERPL-MCNC: 13.4 MG/DL (ref 0.9–1.3)
DEPRECATED RDW RBC AUTO: 14.6 % (ref 12.4–15.4)
GFR SERPLBLD CREATININE-BSD FMLA CKD-EPI: 4 ML/MIN/{1.73_M2}
GLUCOSE BLD-MCNC: 113 MG/DL (ref 70–99)
GLUCOSE BLD-MCNC: 116 MG/DL (ref 70–99)
GLUCOSE BLD-MCNC: 172 MG/DL (ref 70–99)
GLUCOSE BLD-MCNC: 186 MG/DL (ref 70–99)
GLUCOSE BLD-MCNC: 197 MG/DL (ref 70–99)
GLUCOSE BLD-MCNC: 269 MG/DL (ref 70–99)
GLUCOSE BLD-MCNC: 311 MG/DL (ref 70–99)
GLUCOSE BLD-MCNC: 348 MG/DL (ref 70–99)
GLUCOSE BLD-MCNC: 425 MG/DL (ref 70–99)
GLUCOSE BLD-MCNC: 77 MG/DL (ref 70–99)
GLUCOSE SERPL-MCNC: 115 MG/DL (ref 70–99)
HCT VFR BLD AUTO: 36.8 % (ref 40.5–52.5)
HGB BLD-MCNC: 12.3 G/DL (ref 13.5–17.5)
MCH RBC QN AUTO: 30 PG (ref 26–34)
MCHC RBC AUTO-ENTMCNC: 33.3 G/DL (ref 31–36)
MCV RBC AUTO: 89.9 FL (ref 80–100)
PERFORMED ON: ABNORMAL
PERFORMED ON: NORMAL
PLATELET # BLD AUTO: 165 K/UL (ref 135–450)
PMV BLD AUTO: 10 FL (ref 5–10.5)
POTASSIUM SERPL-SCNC: 3.9 MMOL/L (ref 3.5–5.1)
RBC # BLD AUTO: 4.09 M/UL (ref 4.2–5.9)
SODIUM SERPL-SCNC: 132 MMOL/L (ref 136–145)
WBC # BLD AUTO: 8.2 K/UL (ref 4–11)

## 2024-06-29 PROCEDURE — 6370000000 HC RX 637 (ALT 250 FOR IP): Performed by: STUDENT IN AN ORGANIZED HEALTH CARE EDUCATION/TRAINING PROGRAM

## 2024-06-29 PROCEDURE — 2580000003 HC RX 258: Performed by: INTERNAL MEDICINE

## 2024-06-29 PROCEDURE — 6360000002 HC RX W HCPCS: Performed by: STUDENT IN AN ORGANIZED HEALTH CARE EDUCATION/TRAINING PROGRAM

## 2024-06-29 PROCEDURE — 85027 COMPLETE CBC AUTOMATED: CPT

## 2024-06-29 PROCEDURE — 80048 BASIC METABOLIC PNL TOTAL CA: CPT

## 2024-06-29 PROCEDURE — 6360000002 HC RX W HCPCS: Performed by: INTERNAL MEDICINE

## 2024-06-29 PROCEDURE — 2580000003 HC RX 258: Performed by: STUDENT IN AN ORGANIZED HEALTH CARE EDUCATION/TRAINING PROGRAM

## 2024-06-29 PROCEDURE — 99233 SBSQ HOSP IP/OBS HIGH 50: CPT | Performed by: CLINICAL NURSE SPECIALIST

## 2024-06-29 PROCEDURE — 90945 DIALYSIS ONE EVALUATION: CPT

## 2024-06-29 PROCEDURE — 73718 MRI LOWER EXTREMITY W/O DYE: CPT

## 2024-06-29 PROCEDURE — 6370000000 HC RX 637 (ALT 250 FOR IP): Performed by: INTERNAL MEDICINE

## 2024-06-29 PROCEDURE — 2000000000 HC ICU R&B

## 2024-06-29 RX ORDER — LACTULOSE 10 G/15ML
20 SOLUTION ORAL DAILY
Status: DISCONTINUED | OUTPATIENT
Start: 2024-06-29 | End: 2024-07-04 | Stop reason: HOSPADM

## 2024-06-29 RX ORDER — BISACODYL 5 MG/1
5 TABLET, DELAYED RELEASE ORAL ONCE
Status: COMPLETED | OUTPATIENT
Start: 2024-06-29 | End: 2024-06-29

## 2024-06-29 RX ADMIN — LACTULOSE 20 G: 20 SOLUTION ORAL at 15:06

## 2024-06-29 RX ADMIN — ASPIRIN 81 MG 81 MG: 81 TABLET ORAL at 08:00

## 2024-06-29 RX ADMIN — SERTRALINE 50 MG: 50 TABLET, FILM COATED ORAL at 08:00

## 2024-06-29 RX ADMIN — WATER 1000 MG: 1 INJECTION INTRAMUSCULAR; INTRAVENOUS; SUBCUTANEOUS at 11:30

## 2024-06-29 RX ADMIN — CLOPIDOGREL BISULFATE 75 MG: 75 TABLET ORAL at 07:52

## 2024-06-29 RX ADMIN — ROSUVASTATIN 40 MG: 20 TABLET, FILM COATED ORAL at 20:25

## 2024-06-29 RX ADMIN — FUROSEMIDE 40 MG: 40 TABLET ORAL at 18:08

## 2024-06-29 RX ADMIN — LEVETIRACETAM 500 MG: 500 TABLET, FILM COATED ORAL at 08:00

## 2024-06-29 RX ADMIN — RANOLAZINE 500 MG: 500 TABLET, EXTENDED RELEASE ORAL at 08:00

## 2024-06-29 RX ADMIN — HEPARIN SODIUM 5000 UNITS: 5000 INJECTION INTRAVENOUS; SUBCUTANEOUS at 14:30

## 2024-06-29 RX ADMIN — CARVEDILOL 25 MG: 25 TABLET, FILM COATED ORAL at 08:01

## 2024-06-29 RX ADMIN — SODIUM CHLORIDE, PRESERVATIVE FREE 10 ML: 5 INJECTION INTRAVENOUS at 20:34

## 2024-06-29 RX ADMIN — ONDANSETRON 4 MG: 2 INJECTION INTRAMUSCULAR; INTRAVENOUS at 20:26

## 2024-06-29 RX ADMIN — BISACODYL 5 MG: 5 TABLET, COATED ORAL at 09:30

## 2024-06-29 RX ADMIN — FUROSEMIDE 40 MG: 40 TABLET ORAL at 08:00

## 2024-06-29 RX ADMIN — LEVETIRACETAM 500 MG: 500 TABLET, FILM COATED ORAL at 20:26

## 2024-06-29 RX ADMIN — CARVEDILOL 25 MG: 25 TABLET, FILM COATED ORAL at 20:25

## 2024-06-29 RX ADMIN — RANOLAZINE 500 MG: 500 TABLET, EXTENDED RELEASE ORAL at 20:26

## 2024-06-29 RX ADMIN — POLYETHYLENE GLYCOL 3350 17 G: 17 POWDER, FOR SOLUTION ORAL at 09:30

## 2024-06-29 RX ADMIN — HEPARIN SODIUM 5000 UNITS: 5000 INJECTION INTRAVENOUS; SUBCUTANEOUS at 21:30

## 2024-06-29 RX ADMIN — HEPARIN SODIUM 5000 UNITS: 5000 INJECTION INTRAVENOUS; SUBCUTANEOUS at 06:03

## 2024-06-29 RX ADMIN — SACUBITRIL AND VALSARTAN 1 TABLET: 49; 51 TABLET, FILM COATED ORAL at 08:00

## 2024-06-29 RX ADMIN — SACUBITRIL AND VALSARTAN 1 TABLET: 49; 51 TABLET, FILM COATED ORAL at 20:26

## 2024-06-29 ASSESSMENT — PAIN SCALES - GENERAL
PAINLEVEL_OUTOF10: 0

## 2024-06-29 NOTE — PROGRESS NOTES
Nephrology progress Note  [phone]  [phone]   [employer]       Patient:  Te Garay   : 1965    Brief HPI    59-year-old male with history of ESRD on PD, CAD/CABG/PCI, hypertension, DM 1, hyperlipidemia, seizure disorder presented with chest pain.  Apparently, chest pain woke him up from sleep and was associated with diaphoresis, nausea and emesis.  We were consulted for ESRD management  X-ray of the left foot showed soft tissue swelling suggestive of cellulitis but no evidence of osteomyelitis    Subjective/interval history  Patient seen and examined  No complains, PD without issues overnight.     Meds:  Scheduled Meds:   sacubitril-valsartan  1 tablet Oral BID    heparin (porcine)  5,000 Units SubCUTAneous 3 times per day    cefTRIAXone (ROCEPHIN) IV  1,000 mg IntraVENous Q24H    Insulin Pump - Bolus Dose   SubCUTAneous 4x Daily AC & HS    Insulin Pump - Basal Dose   SubCUTAneous Daily    sodium chloride flush  5-40 mL IntraVENous 2 times per day    aspirin  81 mg Oral Daily    clopidogrel  75 mg Oral Daily    carvedilol  25 mg Oral BID    furosemide  40 mg Oral BID    levETIRAcetam  500 mg Oral BID    ranolazine  500 mg Oral BID    rosuvastatin  40 mg Oral Nightly    sertraline  50 mg Oral Daily    gentamicin   Topical Daily     Continuous Infusions:   sodium chloride      dextrose       PRN Meds:.nitroGLYCERIN, HYDROmorphone **OR** HYDROmorphone, calcium carbonate, sodium chloride flush, sodium chloride, ondansetron **OR** ondansetron, acetaminophen **OR** acetaminophen, polyethylene glycol, dextrose bolus **OR** dextrose bolus, glucagon (rDNA), dextrose, melatonin      Vitals:  BP (!) 147/75   Pulse 74   Temp 97.6 °F (36.4 °C) (Temporal)   Resp 18   Ht 1.727 m (5' 8\")   Wt 87 kg (191 lb 12.8 oz)   SpO2 95%   BMI 29.16 kg/m²       General : awake, alert, not in respiratory distress  HEENT : mucosa moist.  CVS: S1 S2 normal, regular rhythm, no murmurs or rubs.  Lungs: Clear, no  controlled  Continue current regimen    Stable from my standpoint.     Jack Turner DO  6/29/2024  Office Phone : [phone]    Thank you for allowing us to participate in the care of this pt. I willcontinue to follow along. Please call with questions or concerns.

## 2024-06-29 NOTE — FLOWSHEET NOTE
Disconnected from CCPD per protocol.  Effluent: yellow, clear  Total time: 8 hr 39 min   Total UF:  366ml.  Total Volume:  6885 ml.  Dwell time gained:  0 hr 39 min.  Pt Tolerated procedure: well  Report given to: ICU RN  Last BM: 6/22/24    CCPD tx completed in full, zero alarms, total , total volume 6885, total time 8:39, fill time 11min, dwell time 124min, pt tolerated tx well w/o complication, alert and stable at time of departure from bedside

## 2024-06-29 NOTE — PLAN OF CARE
Problem: Neurosensory - Adult  Goal: Achieves stable or improved neurological status  Outcome: Progressing     Problem: Respiratory - Adult  Goal: Achieves optimal ventilation and oxygenation  Outcome: Progressing     Problem: Cardiovascular - Adult  Goal: Maintains optimal cardiac output and hemodynamic stability  6/29/2024 1847 by Ria Woodson RN  Outcome: Progressing  6/29/2024 0754 by Ria Woodson RN  Outcome: Progressing     Problem: Musculoskeletal - Adult  Goal: Return mobility to safest level of function  Outcome: Progressing     Problem: Metabolic/Fluid and Electrolytes - Adult  Goal: Electrolytes maintained within normal limits  Outcome: Progressing

## 2024-06-29 NOTE — PROGRESS NOTES
[facility]   Daily Progress Note      Admit Date:  6/23/2024    HPI:    Mr. Garay is a 59 year old male with history of chronic kidney disease with peritoneal catheter, CAD with CABG, combined systolic and diastolic heart failure, hyperlipidemia, hypertension, DM, follows with Dr Celestin    He presented to the hospital with complaints of non radiating chest pain.  He has been taking DAPT and did not have nitro to take.  In the ER, the morphine relieved his pain.    He had    Subjective:  Patient is being seen for CAD. There were no acute overnight cardiac events. He is sitting up in the bed on room air and denies any chest pain, palpitations, lightheadedness, edema.  His only complaint is his foot which he is awaiting MRI today.  He does peritoneal dialysis every day and follows with  transplant team.  Creat 13.4 bnp 3897    Objective:   BP (!) 147/75   Pulse 74   Temp 97.6 °F (36.4 °C) (Temporal)   Resp 18   Ht 1.727 m (5' 8\")   Wt 87 kg (191 lb 12.8 oz)   SpO2 95%   BMI 29.16 kg/m²     Intake/Output Summary (Last 24 hours) at 6/29/2024 0846  Last data filed at 6/28/2024 0948  Gross per 24 hour   Intake 480 ml   Output --   Net 480 ml          Physical Exam:  General:  Awake, alert, oriented in NAD  Skin:  Warm and dry.  No unusual bruising or rash  Neck:  Supple.  No JVD or carotid bruit appreciated  Chest:  Normal effort.  Clear to auscultation, no wheezes/rhonchi/rales  Cardiovascular:  RRR, S1/S2, no murmur/gallop/rub  Abdomen:  Soft, nontender, +bowel sounds, peritoneal catheter  Extremities:  No edema  Neurological: No focal deficits  Psychological: Normal mood and affect      Medications:    lactulose  20 g Oral Daily    bisacodyl  5 mg Oral Once    sacubitril-valsartan  1 tablet Oral BID    heparin (porcine)  5,000 Units SubCUTAneous 3 times per day    cefTRIAXone (ROCEPHIN) IV  1,000 mg IntraVENous Q24H    Insulin Pump - Bolus Dose   SubCUTAneous 4x Daily AC & HS    Insulin Pump

## 2024-06-29 NOTE — PROGRESS NOTES
HOSPITALISTS PROGRESS NOTE    6/29/2024 11:59 AM        Name: Te Garay .              Admitted: 6/23/2024  Primary Care Provider: Reid Lei MD (Tel: [phone])      Brief Course: 59 y.o. male  with PMHx of CAD; s/p CABG/ recent PCI& stent HFpEF, ESRD on PD, hypertension, hyperlipidemia diabetes mellitus, and seizure disorder who presented with chest pain.  S/p cardiac stress test which was abnormal, plan for [facility] tomorrow.        Interval history:   Pt seen and examined today.  Overnight events noted, interval ancillary notes and labs reviewed.   Resting in bed he had recurrence of chest pain last night not responding to  sl nitro, started on ngt and heparin gtt. Feels better this am     Assessment & Plan:       Infected Pressure wound stage II left lateral foot X-ray left foot showed soft tissue along the lateral aspect of foot suggestive of cellulitis.  No evidence of osteomyelitis.    MRI 6/29 show osteomyelitis  Podiatry on board  Consult infetious disease  Cont iv rocephin, wound cx   normal yonathan      Chest pain?NSTEMI  Known hx CAD; s/p CABG 2001, [facility] with PCI & stent to CBD on 4/21/2024.  Chronically elevated troponin (-146-177)  EKG negative for acute ischemic pathology  underwent cardiac stress test which was abnormal.  [facility] t 6/27 show  90 % ISR o SVG-OM1  continue aspirin, Plavix, Imdur, Ranexa, beta-blockers and statins. Plan to cont med management     Acute on chronic CHF  Elevated proBNP on admission. Echo on 4/30/24 showed EF of 30 -35%. Hypokinesis of the basal anteroseptal, inferoseptal, basal inferior Grade I diastolic dysfunction.    Cardiology input appreciated.  Continue beta-blockers, Imdur, hydralazine and Lasix.   Not on ACE/ARB/Arni or spironolactone due to renal dysfunction. strict I&O's, daily weights.  Fluid and salt restriction       Hx of ESRD; on PD at home  Stated he uses cycler every night but

## 2024-06-29 NOTE — PROGRESS NOTES
Received a call from the pt wife that patients blood sugar was at 80. Rechecked with our glucometer  and it was 77. Gave pt an apple juices and patient requested another.  asked patient what their device said that their blood sugar is and it was 105. Patient is now in bed, alarm on and call light within reach. Will continue to provide care.

## 2024-06-29 NOTE — PROGRESS NOTES
F/u of L foot wound and infection. Awaiting results of MRI.       VASCULAR: DP pulses are palpable b/l. PT pulses are nonpalpable b/l. CFT is brisk to the digits of the foot b/l. Skin temperature is warm to cool from proximal to distal with no focal calor noted. Trace edema noted. No pain with calf compression b/l.     NEUROLOGIC: Gross sensation is intact b/l. Epicritic sensation is diminished b/l.     DERMATOLOGIC: Hyperkeratotic area  of skin break-down appreciated to the lateral aspect of the left 5th metatarsal base. Mild serous drainage noted. Wound does not probe deep.     MUSCULOSKELETAL: Muscle strength is 5/5 for all pedal groups tested. Prominence noted to the calcaneocuboid joint and 5th metatarso-cuboid joint that is mildly tender to palpation.       IMPRESSION/RECOMMENDATIONS:    1. Pressure wound, stage 2, left lateral foot  2. Charcot arthropathy, left foot  3. Diabetes mellitus, type I, with peripheral neuropathy      Await results of MRI to evaluate for deeper foot infection.

## 2024-06-29 NOTE — PLAN OF CARE
Problem: Neurosensory - Adult  Goal: Achieves stable or improved neurological status  Outcome: Progressing     Problem: Cardiovascular - Adult  Goal: Maintains optimal cardiac output and hemodynamic stability  Outcome: Progressing  NSR  No chest pain  Recent cath stenosis SVG--om  Medical management  Plavix given  Problem: Musculoskeletal - Adult  Goal: Return mobility to safest level of function  Outcome: Progressing  Left lateral ft ulce  MRI today     Problem: Metabolic/Fluid and Electrolytes - Adult  Goal: Electrolytes maintained within normal limits  Outcome: Progressing     ON PD  Creat 13

## 2024-06-29 NOTE — FLOWSHEET NOTE
CCPD Order   Exchanges: 3   Exchange Volume: 2300 ml   Total Time: 8 hrs   Dextrose: 1.5%   Last Fill: 0 ml   Total Volume: 6900 ml     Orders verified. Supplies taken to pt's room. Report received. Cycler set up, primed and pre tested. Dressing changed on [facility] Cath site. Pt connected to cycler. CCPD initiated without problem. Initial effluent clear.       If problems should arise please call the [phone] on top of PD cycler machine.         06/29/24 1820   Vitals   BP (!) 156/76   Temp 97.6 °F (36.4 °C)   Temp Source Temporal   Pulse 72   Respirations 16   Weight - Scale 88.5 kg (195 lb 1.7 oz)

## 2024-06-30 LAB
ANION GAP SERPL CALCULATED.3IONS-SCNC: 17 MMOL/L (ref 3–16)
BUN SERPL-MCNC: 78 MG/DL (ref 7–20)
CALCIUM SERPL-MCNC: 7.9 MG/DL (ref 8.3–10.6)
CHLORIDE SERPL-SCNC: 93 MMOL/L (ref 99–110)
CO2 SERPL-SCNC: 23 MMOL/L (ref 21–32)
CREAT SERPL-MCNC: 12.7 MG/DL (ref 0.9–1.3)
DEPRECATED RDW RBC AUTO: 14.3 % (ref 12.4–15.4)
GFR SERPLBLD CREATININE-BSD FMLA CKD-EPI: 4 ML/MIN/{1.73_M2}
GLUCOSE BLD-MCNC: 158 MG/DL (ref 70–99)
GLUCOSE SERPL-MCNC: 134 MG/DL (ref 70–99)
HCT VFR BLD AUTO: 37.8 % (ref 40.5–52.5)
HGB BLD-MCNC: 12.8 G/DL (ref 13.5–17.5)
MCH RBC QN AUTO: 30.3 PG (ref 26–34)
MCHC RBC AUTO-ENTMCNC: 33.7 G/DL (ref 31–36)
MCV RBC AUTO: 89.8 FL (ref 80–100)
PERFORMED ON: ABNORMAL
PLATELET # BLD AUTO: 147 K/UL (ref 135–450)
PMV BLD AUTO: 10 FL (ref 5–10.5)
POTASSIUM SERPL-SCNC: 3.7 MMOL/L (ref 3.5–5.1)
RBC # BLD AUTO: 4.21 M/UL (ref 4.2–5.9)
SODIUM SERPL-SCNC: 133 MMOL/L (ref 136–145)
WBC # BLD AUTO: 6.1 K/UL (ref 4–11)

## 2024-06-30 PROCEDURE — 6360000002 HC RX W HCPCS: Performed by: STUDENT IN AN ORGANIZED HEALTH CARE EDUCATION/TRAINING PROGRAM

## 2024-06-30 PROCEDURE — 90945 DIALYSIS ONE EVALUATION: CPT

## 2024-06-30 PROCEDURE — 2000000000 HC ICU R&B

## 2024-06-30 PROCEDURE — 6370000000 HC RX 637 (ALT 250 FOR IP): Performed by: PODIATRIST

## 2024-06-30 PROCEDURE — 2580000003 HC RX 258: Performed by: INTERNAL MEDICINE

## 2024-06-30 PROCEDURE — 6370000000 HC RX 637 (ALT 250 FOR IP): Performed by: INTERNAL MEDICINE

## 2024-06-30 PROCEDURE — 2580000003 HC RX 258: Performed by: STUDENT IN AN ORGANIZED HEALTH CARE EDUCATION/TRAINING PROGRAM

## 2024-06-30 PROCEDURE — 87070 CULTURE OTHR SPECIMN AEROBIC: CPT

## 2024-06-30 PROCEDURE — 6370000000 HC RX 637 (ALT 250 FOR IP): Performed by: STUDENT IN AN ORGANIZED HEALTH CARE EDUCATION/TRAINING PROGRAM

## 2024-06-30 PROCEDURE — 6360000002 HC RX W HCPCS: Performed by: INTERNAL MEDICINE

## 2024-06-30 PROCEDURE — 99232 SBSQ HOSP IP/OBS MODERATE 35: CPT | Performed by: CLINICAL NURSE SPECIALIST

## 2024-06-30 PROCEDURE — 87205 SMEAR GRAM STAIN: CPT

## 2024-06-30 PROCEDURE — 80048 BASIC METABOLIC PNL TOTAL CA: CPT

## 2024-06-30 PROCEDURE — 99223 1ST HOSP IP/OBS HIGH 75: CPT | Performed by: INTERNAL MEDICINE

## 2024-06-30 PROCEDURE — 6370000000 HC RX 637 (ALT 250 FOR IP): Performed by: NURSE PRACTITIONER

## 2024-06-30 PROCEDURE — 85027 COMPLETE CBC AUTOMATED: CPT

## 2024-06-30 RX ORDER — LINEZOLID 600 MG/1
600 TABLET, FILM COATED ORAL EVERY 12 HOURS SCHEDULED
Status: DISCONTINUED | OUTPATIENT
Start: 2024-06-30 | End: 2024-07-03

## 2024-06-30 RX ADMIN — WATER 1000 MG: 1 INJECTION INTRAMUSCULAR; INTRAVENOUS; SUBCUTANEOUS at 11:42

## 2024-06-30 RX ADMIN — LINEZOLID 600 MG: 600 TABLET, FILM COATED ORAL at 20:44

## 2024-06-30 RX ADMIN — MELATONIN TAB 3 MG 6 MG: 3 TAB at 01:21

## 2024-06-30 RX ADMIN — FUROSEMIDE 40 MG: 40 TABLET ORAL at 16:39

## 2024-06-30 RX ADMIN — SACUBITRIL AND VALSARTAN 1 TABLET: 49; 51 TABLET, FILM COATED ORAL at 08:12

## 2024-06-30 RX ADMIN — RANOLAZINE 500 MG: 500 TABLET, EXTENDED RELEASE ORAL at 20:44

## 2024-06-30 RX ADMIN — ONDANSETRON 4 MG: 2 INJECTION INTRAMUSCULAR; INTRAVENOUS at 17:47

## 2024-06-30 RX ADMIN — LEVETIRACETAM 500 MG: 500 TABLET, FILM COATED ORAL at 20:44

## 2024-06-30 RX ADMIN — LEVETIRACETAM 500 MG: 500 TABLET, FILM COATED ORAL at 08:12

## 2024-06-30 RX ADMIN — HEPARIN SODIUM 5000 UNITS: 5000 INJECTION INTRAVENOUS; SUBCUTANEOUS at 06:16

## 2024-06-30 RX ADMIN — LACTULOSE 20 G: 20 SOLUTION ORAL at 08:13

## 2024-06-30 RX ADMIN — HEPARIN SODIUM 5000 UNITS: 5000 INJECTION INTRAVENOUS; SUBCUTANEOUS at 14:36

## 2024-06-30 RX ADMIN — SACUBITRIL AND VALSARTAN 1 TABLET: 49; 51 TABLET, FILM COATED ORAL at 20:44

## 2024-06-30 RX ADMIN — SODIUM CHLORIDE, PRESERVATIVE FREE 10 ML: 5 INJECTION INTRAVENOUS at 08:13

## 2024-06-30 RX ADMIN — MUPIROCIN: 20 OINTMENT TOPICAL at 20:44

## 2024-06-30 RX ADMIN — HYDROMORPHONE HYDROCHLORIDE 0.25 MG: 1 INJECTION, SOLUTION INTRAMUSCULAR; INTRAVENOUS; SUBCUTANEOUS at 08:09

## 2024-06-30 RX ADMIN — CARVEDILOL 25 MG: 25 TABLET, FILM COATED ORAL at 08:12

## 2024-06-30 RX ADMIN — ROSUVASTATIN 40 MG: 20 TABLET, FILM COATED ORAL at 20:44

## 2024-06-30 RX ADMIN — CARVEDILOL 25 MG: 25 TABLET, FILM COATED ORAL at 20:44

## 2024-06-30 RX ADMIN — PIPERACILLIN AND TAZOBACTAM 3375 MG: 3; .375 INJECTION, POWDER, LYOPHILIZED, FOR SOLUTION INTRAVENOUS at 18:37

## 2024-06-30 RX ADMIN — ANTACID TABLETS 500 MG: 500 TABLET, CHEWABLE ORAL at 01:21

## 2024-06-30 RX ADMIN — ASPIRIN 81 MG 81 MG: 81 TABLET ORAL at 08:12

## 2024-06-30 RX ADMIN — ACETAMINOPHEN 650 MG: 325 TABLET ORAL at 14:41

## 2024-06-30 RX ADMIN — FUROSEMIDE 40 MG: 40 TABLET ORAL at 08:12

## 2024-06-30 RX ADMIN — HYDROMORPHONE HYDROCHLORIDE 0.5 MG: 1 INJECTION, SOLUTION INTRAMUSCULAR; INTRAVENOUS; SUBCUTANEOUS at 20:58

## 2024-06-30 RX ADMIN — SERTRALINE 50 MG: 50 TABLET, FILM COATED ORAL at 08:12

## 2024-06-30 RX ADMIN — SODIUM CHLORIDE, PRESERVATIVE FREE 10 ML: 5 INJECTION INTRAVENOUS at 20:44

## 2024-06-30 RX ADMIN — HEPARIN SODIUM 5000 UNITS: 5000 INJECTION INTRAVENOUS; SUBCUTANEOUS at 20:46

## 2024-06-30 RX ADMIN — CLOPIDOGREL BISULFATE 75 MG: 75 TABLET ORAL at 08:12

## 2024-06-30 RX ADMIN — GENTAMICIN SULFATE: 1 CREAM TOPICAL at 09:30

## 2024-06-30 RX ADMIN — RANOLAZINE 500 MG: 500 TABLET, EXTENDED RELEASE ORAL at 08:12

## 2024-06-30 RX ADMIN — HYDROMORPHONE HYDROCHLORIDE 0.5 MG: 1 INJECTION, SOLUTION INTRAMUSCULAR; INTRAVENOUS; SUBCUTANEOUS at 11:52

## 2024-06-30 ASSESSMENT — PAIN SCALES - GENERAL
PAINLEVEL_OUTOF10: 7
PAINLEVEL_OUTOF10: 1
PAINLEVEL_OUTOF10: 0
PAINLEVEL_OUTOF10: 4
PAINLEVEL_OUTOF10: 0
PAINLEVEL_OUTOF10: 2
PAINLEVEL_OUTOF10: 3
PAINLEVEL_OUTOF10: 6

## 2024-06-30 ASSESSMENT — PAIN DESCRIPTION - ORIENTATION
ORIENTATION: LEFT
ORIENTATION: MID
ORIENTATION: LEFT
ORIENTATION: LEFT

## 2024-06-30 ASSESSMENT — PAIN - FUNCTIONAL ASSESSMENT
PAIN_FUNCTIONAL_ASSESSMENT: PREVENTS OR INTERFERES SOME ACTIVE ACTIVITIES AND ADLS
PAIN_FUNCTIONAL_ASSESSMENT: ACTIVITIES ARE NOT PREVENTED
PAIN_FUNCTIONAL_ASSESSMENT: PREVENTS OR INTERFERES SOME ACTIVE ACTIVITIES AND ADLS

## 2024-06-30 ASSESSMENT — PAIN DESCRIPTION - PAIN TYPE
TYPE: ACUTE PAIN

## 2024-06-30 ASSESSMENT — PAIN DESCRIPTION - FREQUENCY
FREQUENCY: CONTINUOUS

## 2024-06-30 ASSESSMENT — PAIN DESCRIPTION - DIRECTION
RADIATING_TOWARDS: L FOOT

## 2024-06-30 ASSESSMENT — PAIN DESCRIPTION - DESCRIPTORS
DESCRIPTORS: THROBBING
DESCRIPTORS: THROBBING
DESCRIPTORS: SORE
DESCRIPTORS: DISCOMFORT

## 2024-06-30 ASSESSMENT — PAIN DESCRIPTION - LOCATION
LOCATION: FOOT

## 2024-06-30 ASSESSMENT — PAIN DESCRIPTION - ONSET
ONSET: PROGRESSIVE
ONSET: ON-GOING
ONSET: ON-GOING

## 2024-06-30 NOTE — PROGRESS NOTES
CCPD Order   Exchanges: 3    Exchange Volume: 2300 ml   Total Time: 8 hrs   Dextrose: 1.5%   Total Volume: 6900 ml     Orders verified. Supplies taken to pt's room. Report received. Cycler set up, primed and pre tested. [facility] Pt connected to cycler. CCPD initiated without problem. Initial effluent clear.       If problems should arise please call the [phone] on top of PD cycler machine.

## 2024-06-30 NOTE — PROGRESS NOTES
Nephrology progress Note  [phone]  [phone]   [email or web address]       Patient:  Te Garay   : 1965    Brief HPI    59-year-old male with history of ESRD on PD, CAD/CABG/PCI, hypertension, DM 1, hyperlipidemia, seizure disorder presented with chest pain.  Apparently, chest pain woke him up from sleep and was associated with diaphoresis, nausea and emesis.  We were consulted for ESRD management  X-ray of the left foot showed soft tissue swelling suggestive of cellulitis but no evidence of osteomyelitis    Subjective/interval history  Patient seen and examined  No complains, PD without issues overnight.   MRI results reviewed.     Meds:  Scheduled Meds:   lactulose  20 g Oral Daily    sacubitril-valsartan  1 tablet Oral BID    heparin (porcine)  5,000 Units SubCUTAneous 3 times per day    cefTRIAXone (ROCEPHIN) IV  1,000 mg IntraVENous Q24H    Insulin Pump - Bolus Dose   SubCUTAneous 4x Daily AC & HS    Insulin Pump - Basal Dose   SubCUTAneous Daily    sodium chloride flush  5-40 mL IntraVENous 2 times per day    aspirin  81 mg Oral Daily    clopidogrel  75 mg Oral Daily    carvedilol  25 mg Oral BID    furosemide  40 mg Oral BID    levETIRAcetam  500 mg Oral BID    ranolazine  500 mg Oral BID    rosuvastatin  40 mg Oral Nightly    sertraline  50 mg Oral Daily    gentamicin   Topical Daily     Continuous Infusions:   sodium chloride      dextrose       PRN Meds:.nitroGLYCERIN, HYDROmorphone **OR** HYDROmorphone, calcium carbonate, sodium chloride flush, sodium chloride, ondansetron **OR** ondansetron, acetaminophen **OR** acetaminophen, polyethylene glycol, dextrose bolus **OR** dextrose bolus, glucagon (rDNA), dextrose, melatonin      Vitals:  /77   Pulse 70   Temp 97.4 °F (36.3 °C) (Temporal)   Resp 14   Ht 1.727 m (5' 8\")   Wt 86.6 kg (190 lb 14.7 oz)   SpO2 95%   BMI 29.03 kg/m²       General : awake, alert, not in respiratory distress  HEENT : mucosa moist.  CVS: S1 S2 normal, regular

## 2024-06-30 NOTE — PROGRESS NOTES
Disconnected from [facility] per protocol.  Effluent: clear  Total time: 8 hr 26 min   Total UF:  339 ml.  Total Volume:  6885 ml.  Dwell time gained:  0 hr 34 min.  Pt Tolerated procedure: well

## 2024-06-30 NOTE — PROGRESS NOTES
HOSPITALISTS PROGRESS NOTE    6/30/2024 12:57 PM        Name: Te Garay .              Admitted: 6/23/2024  Primary Care Provider: Reid Lei MD (Tel: [phone])      Brief Course: 59 y.o. male  with PMHx of CAD; s/p CABG/ recent PCI& stent HFpEF, ESRD on PD, hypertension, hyperlipidemia diabetes mellitus, and seizure disorder who presented with chest pain.  S/p cardiac stress test which was abnormal, plan for [facility] tomorrow.        Interval history:   Pt seen and examined today.  Overnight events noted, interval ancillary notes and labs reviewed.   Resting in bed , not much cp episode  but has persitent lt foot discomfort.    Assessment & Plan:       Infected Pressure wound stage II left lateral foot X-ray left foot showed soft tissue along the lateral aspect of foot suggestive of cellulitis.  No evidence of osteomyelitis.    MRI 6/29 show osteomyelitis  Podiatry on board. D/w dr Coker, DPM.  Feels it could be  charcot changes rather than true osteo  Consult infetious disease  Continue iv rocephin for now  wound cx  normal yonathan      Chest pain?NSTEMI  Known hx CAD; s/p CABG 2001, [facility] with PCI & stent to CBD on 4/21/2024.  Chronically elevated troponin (-146-177)  EKG negative for acute ischemic pathology  underwent cardiac stress test which was abnormal.  [facility] t 6/27 show  90 % ISR o SVG-OM1  continue aspirin, Plavix, Imdur, Ranexa, beta-blockers and statins. Plan to cont med management     Acute on chronic CHF  Elevated proBNP on admission. Echo on 4/30/24 showed EF of 30 -35%. Hypokinesis of the basal anteroseptal, inferoseptal, basal inferior Grade I diastolic dysfunction.    Cardiology input appreciated.  Continue beta-blockers, Imdur, hydralazine and Lasix.   Not on ACE/ARB/Arni or spironolactone due to renal dysfunction. strict I&O's, daily weights.  Fluid and salt restriction       Hx of ESRD; on PD at home  Stated he

## 2024-06-30 NOTE — CONSULTS
04/20/2011         Social History:    Social History     Tobacco Use    Smoking status: Never    Smokeless tobacco: Never   Vaping Use    Vaping Use: Never used   Substance Use Topics    Alcohol use: Not Currently     Comment: one drink a month    Drug use: No     Social History     Tobacco Use   Smoking Status Never   Smokeless Tobacco Never      Family History   Problem Relation Age of Onset    Hypertension Mother     Seizures Mother         epilepsy    Coronary Art Dis Father     Diabetes Father     Heart Murmur Sister     Thyroid Disease Sister     Mult Sclerosis Brother     Arthritis Brother     Sleep Apnea Brother     Coronary Art Dis Paternal Grandmother     Diabetes Paternal Grandmother           REVIEW OF SYSTEMS:      Constitutional:  negative for fevers, chills, night sweats  Eyes:  negative for blurred vision, eye discharge, visual disturbance   HEENT:  negative for hearing loss, ear drainage,nasal congestion  Respiratory:  negative for cough, shortness of breath or hemoptysis   Cardiovascular:  negative for chest pain, palpitations, syncope  Gastrointestinal:  negative for nausea, vomiting, diarrhea, constipation, abdominal pain  Genitourinary:  negative for frequency, dysuria, urinary incontinence, hematuria  Hematologic/Lymphatic:  negative for easy bruising, bleeding and lymphadenopathy  Allergic/Immunologic:  negative for recurrent infections, angioedema, anaphylaxis   Endocrine:  negative for weight changes, polyuria, polydipsia and polyphagia  Musculoskeletal: Left foot swelling pain swelling, decreased range of motion  Integumentary: No rashes, skin lesions  Neurological:  negative for headaches, slurred speech, unilateral weakness  Psychiatric: negative for hallucinations,confusion,agitation.     PHYSICAL EXAM:      Vitals:    /77   Pulse 70   Temp 97.4 °F (36.3 °C) (Temporal)   Resp 14   Ht 1.727 m (5' 8\")   Wt 86.6 kg (190 lb 14.7 oz)   SpO2 95%   BMI 29.03 kg/m²     General  Date/Time    ALKPHOS 97 06/23/2024 04:03 AM    ALT 11 06/23/2024 04:03 AM    AST 13 06/23/2024 04:03 AM    PROT 7.9 09/28/2010 10:08 AM    BILITOT 0.4 06/23/2024 04:03 AM    BILIDIR <0.2 08/22/2023 10:10 AM    IBILI see below 08/22/2023 10:10 AM     UA:  Lab Results   Component Value Date/Time    COLORU Yellow 04/19/2024 10:30 AM    CLARITYU CLOUDY 04/19/2024 10:30 AM    GLUCOSEU 500 04/19/2024 10:30 AM    GLUCOSEU >=1000 05/25/2010 07:19 PM    BILIRUBINUR Negative 04/19/2024 10:30 AM    BILIRUBINUR NEGATIVE 05/25/2010 07:19 PM    KETUA TRACE 04/19/2024 10:30 AM    BLOODU MODERATE 04/19/2024 10:30 AM    PHUR 5.0 04/19/2024 10:30 AM    PROTEINU 100 04/19/2024 10:30 AM    UROBILINOGEN 0.2 04/19/2024 10:30 AM    NITRU Negative 04/19/2024 10:30 AM    LEUKOCYTESUR Negative 04/19/2024 10:30 AM    URINETYPE NotGiven 04/19/2024 10:30 AM      Urine Microscopic:   Lab Results   Component Value Date/Time    BACTERIA None Seen 04/19/2024 10:30 AM    COMU see below 08/22/2023 02:46 PM    HYALCAST 1 04/19/2024 10:30 AM    WBCUA 3 04/19/2024 10:30 AM    RBCUA 2 04/19/2024 10:30 AM     Urine Reflex to Culture:   Lab Results   Component Value Date/Time    URRFLXCULT Not Indicated 04/19/2024 10:30 AM         MICRO: cultures reviewed and updated by me     Culture, Wound [[id number]] (Abnormal) Collected: 06/26/24 0950   Order Status: Completed Specimen: Foot Updated: 06/28/24 0743    WOUND/ABSCESS --    Moderate growth Mixed skin yonathan,  No further workup    Gram Stain Result 1+ WBC's (Polymorphonuclear)  1+ Gram positive cocci  No Epithelial Cells seen   Abnormal    Narrative:     ORDER#: [id number]                          ORDERED BY: COLT ERNANDEZ  SOURCE: Foot                               COLLECTED:  06/26/24 09:50  ANTIBIOTICS AT PEGGY.:                      RECEIVED :  06/26/24 10:43   Culture, Blood 1 [[id number]]    Order Status: Canceled Specimen: Blood    Culture, Blood 2 [[id number]]    Order Status: Canceled Specimen:

## 2024-06-30 NOTE — PLAN OF CARE
Problem: Neurosensory - Adult  Goal: Achieves stable or improved neurological status  Outcome: Adequate for Discharge     Problem: Respiratory - Adult  Goal: Achieves optimal ventilation and oxygenation  6/30/2024 0715 by Anmol Saldaña RN  Outcome: Adequate for Discharge  6/29/2024 1847 by Ria Woodson RN  Outcome: Progressing     Problem: Cardiovascular - Adult  Goal: Maintains optimal cardiac output and hemodynamic stability  6/30/2024 0715 by Anmol Saldaña RN  Outcome: Progressing  6/29/2024 1847 by Ria Woodson RN  Outcome: Progressing     Problem: Skin/Tissue Integrity - Adult  Goal: Skin integrity remains intact  Outcome: Progressing     Problem: Musculoskeletal - Adult  Goal: Return mobility to safest level of function  Outcome: Progressing     Problem: Gastrointestinal - Adult  Goal: Minimal or absence of nausea and vomiting  Outcome: Progressing     Problem: Infection - Adult  Goal: Absence of infection at discharge  Outcome: Progressing     Problem: ABCDS Injury Assessment  Goal: Absence of physical injury  Outcome: Progressing     Problem: Pain  Goal: Verbalizes/displays adequate comfort level or baseline comfort level  Outcome: Progressing     Problem: Safety - Adult  Goal: Free from fall injury  Outcome: Progressing

## 2024-06-30 NOTE — PROGRESS NOTES
Podiatry Progress Note    Subjective:  The patient is a 59 y.o. male with significant past medical history as stated below who is consulted for a left foot wound. Patient is an established patient of Dr. Hill, where he has been seen in the past for a prior wound and now callus of the left foot, secondary to a charcot deformity that he has had for many years. He does relate to some mild drainage from the site, which is new, and also pain.     ROS: Denies nausea, vomiting, fevers, chills.    Past Medical History:   Diagnosis Date    Angina at rest ([facility])     Cardiomyopathy ([facility])     Carotid artery stenosis 10/25/2016    SUZANNA stented with 8 x 30 mm non-tapered Xact stent    CHF (congestive heart failure) ([facility]) 03/03/2015    EF 30%     CKD (chronic kidney disease) stage 2, GFR 60-89 ml/min     Coronary artery disease     sp CABG UC    Diabetic peripheral neuropathy ([facility])     Diastolic HF (heart failure) ([facility])     Hyperlipidemia with target LDL less than 70     Hypertension goal BP (blood pressure) < 130/80     PVD (peripheral vascular disease) ([facility])     Seizures ([facility])     in childhood    Type 1 diabetes mellitus with chronic kidney disease ([facility])     c-peptide <0.1 in 2015        Allergies   Allergen Reactions    Iodides Anaphylaxis    Shellfish-Derived Products Anaphylaxis    Lipitor [Atorvastatin]     Atorvastatin Calcium Rash        Current Facility-Administered Medications   Medication Dose Route Frequency Provider Last Rate Last Admin    lactulose (CHRONULAC) 10 GM/15ML solution 20 g  20 g Oral Daily Levi Gonzalez MD   20 g at 06/30/24 0813    sacubitril-valsartan (ENTRESTO) 49-51 MG per tablet 1 tablet  1 tablet Oral BID David Parish MD   1 tablet at 06/30/24 0812    heparin (porcine) injection 5,000 Units  5,000 Units SubCUTAneous 3 times per day Levi Gonzalez MD   5,000 Units at 06/30/24 1436    nitroGLYCERIN (NITROSTAT) SL tablet 0.4 mg  0.4 mg SubLINGual Q5 Min PRN Marcus Deleon MD   0.4

## 2024-06-30 NOTE — PROGRESS NOTES
[facility]   Daily Progress Note      Admit Date:  6/23/2024    HPI:    Mr. Garay is a 59 year old male with history of chronic kidney disease with peritoneal catheter, CAD with CABG, combined systolic and diastolic heart failure, hyperlipidemia, hypertension, DM, follows with Dr Celestin    He presented to the hospital with complaints of non radiating chest pain.  He has been taking DAPT and did not have nitro to take.  In the ER, the morphine relieved his pain.    He had    Subjective:  Patient is being seen for CAD. There were no acute overnight cardiac events. He is sitting up in the bed on room air and no chest pain or sob now for several days. MRI of his left foot shows acute osteomyelitis (podiatry and ID consulted per hospitalist).  He is currently finishing his PD.    Creat 12.7 bnp 3897    Objective:   /77   Pulse 70   Temp 97.4 °F (36.3 °C) (Temporal)   Resp 14   Ht 1.727 m (5' 8\")   Wt 86.6 kg (190 lb 14.7 oz)   SpO2 95%   BMI 29.03 kg/m²     Intake/Output Summary (Last 24 hours) at 6/30/2024 0836  Last data filed at 6/29/2024 1842  Gross per 24 hour   Intake 240 ml   Output --   Net 240 ml          Physical Exam:  General:  Awake, alert, oriented in NAD  Skin:  Warm and dry.  No unusual bruising or rash  Neck:  Supple.  No JVD or carotid bruit appreciated  Chest:  Normal effort.  Clear to auscultation, no wheezes/rhonchi/rales  Cardiovascular:  RRR, S1/S2, no murmur/gallop/rub  Abdomen:  Soft, nontender, +bowel sounds, peritoneal catheter  Extremities:  No edema  Neurological: No focal deficits  Psychological: Normal mood and affect      Medications:    lactulose  20 g Oral Daily    sacubitril-valsartan  1 tablet Oral BID    heparin (porcine)  5,000 Units SubCUTAneous 3 times per day    cefTRIAXone (ROCEPHIN) IV  1,000 mg IntraVENous Q24H    Insulin Pump - Bolus Dose   SubCUTAneous 4x Daily AC & HS    Insulin Pump - Basal Dose   SubCUTAneous Daily    sodium chloride flush

## 2024-07-01 PROBLEM — E11.628 DIABETIC FOOT INFECTION (HCC): Status: ACTIVE | Noted: 2024-07-01

## 2024-07-01 PROBLEM — L08.9 DIABETIC FOOT INFECTION (HCC): Status: ACTIVE | Noted: 2024-07-01

## 2024-07-01 PROBLEM — R70.0 ELEVATED SED RATE: Status: ACTIVE | Noted: 2024-07-01

## 2024-07-01 PROBLEM — M86.9 TOE OSTEOMYELITIS, LEFT (HCC): Status: ACTIVE | Noted: 2024-07-01

## 2024-07-01 PROBLEM — L08.9 TYPE 1 DIABETES MELLITUS WITH DIABETIC FOOT INFECTION (HCC): Status: ACTIVE | Noted: 2024-07-01

## 2024-07-01 PROBLEM — E10.42 DIABETIC POLYNEUROPATHY ASSOCIATED WITH TYPE 1 DIABETES MELLITUS (HCC): Status: ACTIVE | Noted: 2024-07-01

## 2024-07-01 PROBLEM — L91.0 HYPERTROPHIC SCAR OF SKIN: Status: ACTIVE | Noted: 2024-07-01

## 2024-07-01 PROBLEM — E11.610 CHARCOT FOOT DUE TO DIABETES MELLITUS (HCC): Status: ACTIVE | Noted: 2024-07-01

## 2024-07-01 PROBLEM — E10.628 TYPE 1 DIABETES MELLITUS WITH DIABETIC FOOT INFECTION (HCC): Status: ACTIVE | Noted: 2024-07-01

## 2024-07-01 LAB
ANION GAP SERPL CALCULATED.3IONS-SCNC: 17 MMOL/L (ref 3–16)
BUN SERPL-MCNC: 77 MG/DL (ref 7–20)
CALCIUM SERPL-MCNC: 8.4 MG/DL (ref 8.3–10.6)
CHLORIDE SERPL-SCNC: 88 MMOL/L (ref 99–110)
CO2 SERPL-SCNC: 26 MMOL/L (ref 21–32)
CREAT SERPL-MCNC: 12.9 MG/DL (ref 0.9–1.3)
CRP SERPL-MCNC: 7 MG/L (ref 0–5.1)
DEPRECATED RDW RBC AUTO: 14.8 % (ref 12.4–15.4)
ERYTHROCYTE [SEDIMENTATION RATE] IN BLOOD BY WESTERGREN METHOD: 42 MM/HR (ref 0–20)
EST. AVERAGE GLUCOSE BLD GHB EST-MCNC: 182.9 MG/DL
GFR SERPLBLD CREATININE-BSD FMLA CKD-EPI: 4 ML/MIN/{1.73_M2}
GLUCOSE BLD-MCNC: 128 MG/DL (ref 70–99)
GLUCOSE BLD-MCNC: 144 MG/DL (ref 70–99)
GLUCOSE BLD-MCNC: 200 MG/DL (ref 70–99)
GLUCOSE SERPL-MCNC: 179 MG/DL (ref 70–99)
HBA1C MFR BLD: 8 %
HCT VFR BLD AUTO: 41.6 % (ref 40.5–52.5)
HGB BLD-MCNC: 13.9 G/DL (ref 13.5–17.5)
MCH RBC QN AUTO: 30 PG (ref 26–34)
MCHC RBC AUTO-ENTMCNC: 33.5 G/DL (ref 31–36)
MCV RBC AUTO: 89.6 FL (ref 80–100)
PERFORMED ON: ABNORMAL
PLATELET # BLD AUTO: 167 K/UL (ref 135–450)
PMV BLD AUTO: 10.3 FL (ref 5–10.5)
POTASSIUM SERPL-SCNC: 3.9 MMOL/L (ref 3.5–5.1)
RBC # BLD AUTO: 4.64 M/UL (ref 4.2–5.9)
SODIUM SERPL-SCNC: 131 MMOL/L (ref 136–145)
WBC # BLD AUTO: 5 K/UL (ref 4–11)

## 2024-07-01 PROCEDURE — 6370000000 HC RX 637 (ALT 250 FOR IP): Performed by: INTERNAL MEDICINE

## 2024-07-01 PROCEDURE — 6360000002 HC RX W HCPCS: Performed by: STUDENT IN AN ORGANIZED HEALTH CARE EDUCATION/TRAINING PROGRAM

## 2024-07-01 PROCEDURE — 6370000000 HC RX 637 (ALT 250 FOR IP): Performed by: STUDENT IN AN ORGANIZED HEALTH CARE EDUCATION/TRAINING PROGRAM

## 2024-07-01 PROCEDURE — 2580000003 HC RX 258: Performed by: INTERNAL MEDICINE

## 2024-07-01 PROCEDURE — 85027 COMPLETE CBC AUTOMATED: CPT

## 2024-07-01 PROCEDURE — 90945 DIALYSIS ONE EVALUATION: CPT

## 2024-07-01 PROCEDURE — 6360000002 HC RX W HCPCS: Performed by: INTERNAL MEDICINE

## 2024-07-01 PROCEDURE — 80048 BASIC METABOLIC PNL TOTAL CA: CPT

## 2024-07-01 PROCEDURE — 87076 CULTURE ANAEROBE IDENT EACH: CPT

## 2024-07-01 PROCEDURE — 1200000000 HC SEMI PRIVATE

## 2024-07-01 PROCEDURE — 94760 N-INVAS EAR/PLS OXIMETRY 1: CPT

## 2024-07-01 PROCEDURE — 86140 C-REACTIVE PROTEIN: CPT

## 2024-07-01 PROCEDURE — 83036 HEMOGLOBIN GLYCOSYLATED A1C: CPT

## 2024-07-01 PROCEDURE — 85652 RBC SED RATE AUTOMATED: CPT

## 2024-07-01 PROCEDURE — 87075 CULTR BACTERIA EXCEPT BLOOD: CPT

## 2024-07-01 PROCEDURE — 99233 SBSQ HOSP IP/OBS HIGH 50: CPT | Performed by: INTERNAL MEDICINE

## 2024-07-01 RX ORDER — LACTOBACILLUS RHAMNOSUS GG 10B CELL
1 CAPSULE ORAL 2 TIMES DAILY WITH MEALS
Status: DISCONTINUED | OUTPATIENT
Start: 2024-07-01 | End: 2024-07-04 | Stop reason: HOSPADM

## 2024-07-01 RX ADMIN — ROSUVASTATIN 40 MG: 20 TABLET, FILM COATED ORAL at 20:07

## 2024-07-01 RX ADMIN — Medication 1 CAPSULE: at 19:16

## 2024-07-01 RX ADMIN — FUROSEMIDE 40 MG: 40 TABLET ORAL at 19:14

## 2024-07-01 RX ADMIN — ONDANSETRON 4 MG: 2 INJECTION INTRAMUSCULAR; INTRAVENOUS at 07:05

## 2024-07-01 RX ADMIN — HEPARIN SODIUM 5000 UNITS: 5000 INJECTION INTRAVENOUS; SUBCUTANEOUS at 05:59

## 2024-07-01 RX ADMIN — SODIUM CHLORIDE, PRESERVATIVE FREE 10 ML: 5 INJECTION INTRAVENOUS at 22:00

## 2024-07-01 RX ADMIN — CLOPIDOGREL BISULFATE 75 MG: 75 TABLET ORAL at 09:36

## 2024-07-01 RX ADMIN — SACUBITRIL AND VALSARTAN 1 TABLET: 49; 51 TABLET, FILM COATED ORAL at 09:37

## 2024-07-01 RX ADMIN — PIPERACILLIN AND TAZOBACTAM 3375 MG: 3; .375 INJECTION, POWDER, LYOPHILIZED, FOR SOLUTION INTRAVENOUS at 06:00

## 2024-07-01 RX ADMIN — LINEZOLID 600 MG: 600 TABLET, FILM COATED ORAL at 20:07

## 2024-07-01 RX ADMIN — PIPERACILLIN AND TAZOBACTAM 3375 MG: 3; .375 INJECTION, POWDER, LYOPHILIZED, FOR SOLUTION INTRAVENOUS at 19:18

## 2024-07-01 RX ADMIN — CARVEDILOL 25 MG: 25 TABLET, FILM COATED ORAL at 20:07

## 2024-07-01 RX ADMIN — Medication 1 CAPSULE: at 12:24

## 2024-07-01 RX ADMIN — RANOLAZINE 500 MG: 500 TABLET, EXTENDED RELEASE ORAL at 09:39

## 2024-07-01 RX ADMIN — RANOLAZINE 500 MG: 500 TABLET, EXTENDED RELEASE ORAL at 20:07

## 2024-07-01 RX ADMIN — GENTAMICIN SULFATE: 1 CREAM TOPICAL at 09:42

## 2024-07-01 RX ADMIN — ONDANSETRON 4 MG: 2 INJECTION INTRAMUSCULAR; INTRAVENOUS at 19:14

## 2024-07-01 RX ADMIN — FUROSEMIDE 40 MG: 40 TABLET ORAL at 09:39

## 2024-07-01 RX ADMIN — MUPIROCIN: 20 OINTMENT TOPICAL at 09:20

## 2024-07-01 RX ADMIN — HYDROMORPHONE HYDROCHLORIDE 0.5 MG: 1 INJECTION, SOLUTION INTRAMUSCULAR; INTRAVENOUS; SUBCUTANEOUS at 03:58

## 2024-07-01 RX ADMIN — LEVETIRACETAM 500 MG: 500 TABLET, FILM COATED ORAL at 09:30

## 2024-07-01 RX ADMIN — ASPIRIN 81 MG 81 MG: 81 TABLET ORAL at 09:38

## 2024-07-01 RX ADMIN — ONDANSETRON 4 MG: 2 INJECTION INTRAMUSCULAR; INTRAVENOUS at 13:28

## 2024-07-01 RX ADMIN — HYDROMORPHONE HYDROCHLORIDE 0.25 MG: 1 INJECTION, SOLUTION INTRAMUSCULAR; INTRAVENOUS; SUBCUTANEOUS at 21:59

## 2024-07-01 RX ADMIN — CARVEDILOL 25 MG: 25 TABLET, FILM COATED ORAL at 09:38

## 2024-07-01 RX ADMIN — SERTRALINE 50 MG: 50 TABLET, FILM COATED ORAL at 09:30

## 2024-07-01 RX ADMIN — LINEZOLID 600 MG: 600 TABLET, FILM COATED ORAL at 09:30

## 2024-07-01 RX ADMIN — LEVETIRACETAM 500 MG: 500 TABLET, FILM COATED ORAL at 20:07

## 2024-07-01 RX ADMIN — SACUBITRIL AND VALSARTAN 1 TABLET: 49; 51 TABLET, FILM COATED ORAL at 20:07

## 2024-07-01 RX ADMIN — MELATONIN TAB 3 MG 6 MG: 3 TAB at 23:06

## 2024-07-01 RX ADMIN — HEPARIN SODIUM 5000 UNITS: 5000 INJECTION INTRAVENOUS; SUBCUTANEOUS at 13:28

## 2024-07-01 RX ADMIN — HEPARIN SODIUM 5000 UNITS: 5000 INJECTION INTRAVENOUS; SUBCUTANEOUS at 22:00

## 2024-07-01 ASSESSMENT — PAIN DESCRIPTION - ORIENTATION
ORIENTATION: LEFT
ORIENTATION: LEFT

## 2024-07-01 ASSESSMENT — PAIN DESCRIPTION - DESCRIPTORS: DESCRIPTORS: ACHING

## 2024-07-01 ASSESSMENT — ENCOUNTER SYMPTOMS
ABDOMINAL PAIN: 0
EYE REDNESS: 0
BACK PAIN: 0
NAUSEA: 0
COUGH: 0
RHINORRHEA: 0
SINUS PRESSURE: 0
SHORTNESS OF BREATH: 0
WHEEZING: 0
SORE THROAT: 0
DIARRHEA: 0
CONSTIPATION: 0
EYE DISCHARGE: 0
SINUS PAIN: 0

## 2024-07-01 ASSESSMENT — PAIN SCALES - GENERAL
PAINLEVEL_OUTOF10: 4
PAINLEVEL_OUTOF10: 0
PAINLEVEL_OUTOF10: 7
PAINLEVEL_OUTOF10: 2
PAINLEVEL_OUTOF10: 2
PAINLEVEL_OUTOF10: 0
PAINLEVEL_OUTOF10: 3

## 2024-07-01 ASSESSMENT — PAIN DESCRIPTION - LOCATION
LOCATION: FOOT
LOCATION: FOOT

## 2024-07-01 NOTE — PROGRESS NOTES
Nutrition Note    RECOMMENDATIONS  PO Diet: Continue current diet  ONS: Ordered Nepro once daily     ASSESSMENT   Pt triggered for LOS assessment. On 5 carb choice, cardiac restricted diet with minimal documented meal intakes throughout admission but each of %. Upon visiting, pt reported appetite has been poor over the last 24 hours d/t nausea and typically appetite/po intake is variable at home. Would like to receive ONS to offer additional nutrition. Wt hx in EMR indicates wt fluctuations likely r/t fluids from [facility] but overall appears no significant change. RD will monitor for adequate po intake.     Malnutrition Status: No malnutrition  Acute Illness    NUTRITION DIAGNOSIS   Inadequate oral intake related to altered GI function as evidenced by nausea (pt report of poor appetite currently)  Increased nutrient needs related to increase demand for energy/nutrients as evidenced by wounds, dialysis    Goals: PO intake 75% or greater, by next RD assessment     NUTRITION RELATED FINDINGS  Objective: Na 131. POCG 144. LBM 6/30. Trace LLE edema.  Wounds: Diabetic Ulcer    CURRENT NUTRITION THERAPIES  ADULT DIET; Regular; 5 carb choices (75 gm/meal); Low Fat/Low Chol/High Fiber/RUBIA     PO Intake: %   PO Supplement Intake:None Ordered    ANTHROPOMETRICS  Current Height: 172.7 cm (5' 8\")  Current Weight - Scale: 86.2 kg (190 lb 0.6 oz) (drained)    Admission weight: 85 kg (187 lb 6.3 oz)  Ideal Body Weight (IBW): 154 lbs  (70 kg)        BMI: 28.9    COMPARATIVE STANDARDS  Total Energy Requirements (kcals/day): 0608-6499     Protein (g):         Fluid (mL/day):  1365-0752    EDUCATION  Education declined (Denied verbal [facility] 6/25 as familiar with guidelines, handout provided).     The patient will be monitored per nutrition standards of care. Consult dietitian if additional nutrition interventions are needed prior to RD reassessment.     Chelsie Quiroz MS, RD, LD    Contact: [phone]

## 2024-07-01 NOTE — PROGRESS NOTES
HOSPITALISTS PROGRESS NOTE    7/1/2024 3:00 PM        Name: Te Gaary .              Admitted: 6/23/2024  Primary Care Provider: Reid Lei MD (Tel: [phone])      Brief Course: 59 y.o. male  with PMHx of CAD; s/p CABG/ recent PCI& stent HFpEF, ESRD on PD,  who presented with chest pain.  S/p cardiac stress test which was abnormal, underwent [facility] with finding of 90% in-stent restenosis of SVG graft.  Chest pain has improved with optimization of medical management  Also has chronic left foot wound but with signs of infection, MRI concerning for osteomyelitis but podiatry thinks could just be Charcot changes.  Dr. Kinney will discuss with podiatry regarding antibiotic course if no surgical treatment is to be planned.  Expected discharge home in 1 -2 days      Interval history:   Pt seen and examined today.  Overnight events noted, interval ancillary notes and labs reviewed.   Resting in bed , not much cp episode  but has persitent lt foot discomfort.    Assessment & Plan:       Infected Pressure wound stage II left lateral foot X-ray left foot showed soft tissue along the lateral aspect of foot suggestive of cellulitis.  No evidence of osteomyelitis.    MRI 6/29 show osteomyelitis  Podiatry on board. D/w dr Coker, DPM.  Feels it could be  charcot changes rather than true osteo  infectious disease   on board  Continue iv Zyvox and Zosyn   wound cx  normal yonathan, repeat cultures ordered        Chest pain?NSTEMI  Known hx CAD; s/p CABG 2001, [facility] with PCI & stent to CBD on 4/21/2024.  Chronically elevated troponin (-146-177)  EKG negative for acute ischemic pathology  underwent cardiac stress test which was abnormal.  [facility] t 6/27 show  90 % ISR o SVG-OM1  continue aspirin, Plavix, Imdur, Ranexa, beta-blockers and statins. Plan to cont med management     Acute on chronic CHF  Elevated proBNP on admission. Echo on 4/30/24 showed EF

## 2024-07-01 NOTE — CARE COORDINATION
Chart reviewed for discharge planning.    Pt is being followed by ID and on IV abx.    CM called Selena with [employer] infusion company and she will run IV benefits.    CM spoke to patient and he has used [facility] and would like to use them again.    CM will follow up with Cindy with [facility].       Radha Browning RN, BSN  [phone]

## 2024-07-01 NOTE — PROGRESS NOTES
Treatment initiated without complications or complaints from patient. Patient resting comfortably with VSS upon dialysis RN exit from room. LAURA Katz notified of start of treatment and hotline number located on top of machine for any questions about troubleshooting during after hours.

## 2024-07-01 NOTE — PROGRESS NOTES
reports for the current visit were reviewed by me during this visit.  I reviewed the chest x-ray/CT scan/MRI images and independently interpreted the findings and results today.    MRI FOOT LEFT WO CONTRAST   Final Result   1.  Suspected acute osteomyelitis involving the 5th metatarsal base.      2.  Destructive erosive changes centered at the 1st through 3rd   tarsometatarsal joints compatible with severe osteomyelitis and possible   abscess formation.  Secondary destruction of the Lisfranc joint.      3.  Extensive myositis may be infectious or inflammatory.  Subcutaneous edema   throughout the visualized foot concerning for cellulitis.      4.  Peroneus brevis split tear.  Tendinosis of the peroneus longus and   tibialis posterior tendons.      5.  Additional findings, as above.         XR CHEST (2 VW)   Final Result   There is no evidence of acute chest disease.         XR FOOT LEFT (MIN 3 VIEWS)   Final Result   1. Soft tissue swelling at the lateral aspect of the foot suggesting   cellulitis.   2. No evidence of osteomyelitis.   3. Chronic arthropathy at the Lisfranc joint most likely neurogenic   arthropathy.             Medications: All current and past medications were reviewed.     mupirocin   Topical BID    piperacillin-tazobactam  3,375 mg IntraVENous Q12H    linezolid  600 mg Oral 2 times per day    lactulose  20 g Oral Daily    sacubitril-valsartan  1 tablet Oral BID    heparin (porcine)  5,000 Units SubCUTAneous 3 times per day    Insulin Pump - Bolus Dose   SubCUTAneous 4x Daily AC & HS    Insulin Pump - Basal Dose   SubCUTAneous Daily    sodium chloride flush  5-40 mL IntraVENous 2 times per day    aspirin  81 mg Oral Daily    clopidogrel  75 mg Oral Daily    carvedilol  25 mg Oral BID    furosemide  40 mg Oral BID    levETIRAcetam  500 mg Oral BID    ranolazine  500 mg Oral BID    rosuvastatin  40 mg Oral Nightly    sertraline  50 mg Oral Daily    gentamicin   Topical Daily        sodium chloride       dextrose         nitroGLYCERIN, HYDROmorphone **OR** HYDROmorphone, calcium carbonate, sodium chloride flush, sodium chloride, ondansetron **OR** ondansetron, acetaminophen **OR** acetaminophen, polyethylene glycol, dextrose bolus **OR** dextrose bolus, glucagon (rDNA), dextrose, melatonin      Problem list:       Patient Active Problem List   Diagnosis Code    Coronary artery disease I25.10    Hyperlipidemia E78.5    Multifocal pneumonia J18.9    CHF (congestive heart failure) ([facility]) I50.9    Hypertension due to end stage renal disease caused by type 1 diabetes mellitus, on dialysis ([facility]) E10.22, I12.0, Z99.2, N18.6    Diabetic polyneuropathy associated with type 2 diabetes mellitus ([facility]) E11.42    Stenosis of carotid artery I65.29    PVD (peripheral vascular disease) ([facility]) I73.9    Diabetic nephropathy associated with type 1 diabetes mellitus ([facility]) E10.21    H/O carotid angioplasty Z98.62    Seizure disorder ([facility]) G40.909    HTN (hypertension), benign I10    S/P CABG (coronary artery bypass graft) Z95.1    Chronic combined systolic and diastolic heart failure ([facility]) I50.42    ESRD on peritoneal dialysis ([facility]) N18.6, Z99.2    Moderate obstructive sleep apnea G47.33    Unstable angina ([facility]) I20.0    Peritonitis associated with peritoneal dialysis ([facility]) T85.71XA    Peritoneal dialysis catheter in place ([facility]) Z99.2    Poorly controlled type 1 diabetes mellitus ([facility]) E10.65    Overweight (BMI 25.0-29.9) E66.3    HFrEF (heart failure with reduced ejection fraction) ([facility]) I50.20    History of carotid stenosis Z86.79    History of stroke Z86.73    Diabetic ulcer of left foot ([facility]) E11.621, L97.529    A-fib ([facility]) I48.91    Anxiety disorder F41.9    Type 1 diabetes mellitus with chronic kidney disease on chronic dialysis ([facility]) E10.22, N18.6, Z99.2    History of peritonitis Z87.19    Pancytopenia ([facility]) D61.818    History of COVID-19 Z86.16    Acidosis E87.20    De Quervain thyroiditis E06.1    Non-compliant behavior R46.89

## 2024-07-01 NOTE — DISCHARGE INSTR - COC
Continuity of Care Form    Patient Name: Te Garay   :  1965  MRN:  [id number]    Admit date:  2024  Discharge date:  24    Code Status Order: Full Code   Advance Directives:     Admitting Physician:  Reva López MD  PCP: Reid Lei MD    Discharging Nurse: ***  Discharging Hospital Unit/Room#: 3AN-3324/3324-02  Discharging Unit Phone Number: [phone]    Emergency Contact:   Extended Emergency Contact Information  Primary Emergency Contact: Sachi Garay  Address: [address] [place] [address]  Home Phone: [phone]  Mobile Phone: [phone]  Relation: Spouse  Secondary Emergency Contact: Kendell Rodriguez  Home Phone: [phone]  Relation: Child    Past Surgical History:  Past Surgical History:   Procedure Laterality Date    BLADDER SURGERY Left 2023    CYSTOSCOPY, LEFT URETEROSCOPY, STONE BASKET MANIPULATION, PLACEMENT OF LEFT URETERAL STENT performed by Chevy Sepulveda MD at [facility] OR    CARDIAC CATHETERIZATION      CARDIAC PROCEDURE N/A 2024    Left and right heart w coronary bypass graft performed by David Parish MD at [facility] CARDIAC CATH LAB    CARDIAC SURGERY      triple bypass at     CAROTID STENT PLACEMENT Right     CORONARY ARTERY BYPASS GRAFT      FOOT DEBRIDEMENT Left 10/16/2023    LEFT FOOT DEBRIDEMENT INCISION AND DRAINAGE WITH BONE BIOPSY performed by David Lowe DPM at [facility] OR    FOOT DEBRIDEMENT Left 10/23/2023    LEFT FOOT INCISION AND DRAINAGE WITH DELAYED PRIMARY CLOSURE AND DERMAL GRAFT APPLICATION performed by David Lowe DPM at [facility] OR    HERNIA REPAIR      infant    LAPAROSCOPY INSERTION PERITONEAL CATHETER N/A 2020    LAPAROSCOPIC PLACEMENT OF PERITONEAL DIALYSIS  CATHETER performed by Tristen Palacios MD at [facility] OR    TONSILLECTOMY         Immunization History:   Immunization History   Administered Date(s) Administered    DT (pediatric) 1999     Influenza Vaccine, unspecified formulation 11/27/2012    Influenza Virus Vaccine 09/11/2009, 11/27/2012    Influenza, FLUARIX, FLULAVAL, FLUZONE (age 6 mo+) AND AFLURIA, (age 3 y+), PF, 0.5mL 10/26/2016, 02/06/2018, 03/05/2021    Pneumococcal, PPSV23, PNEUMOVAX 23, (age 2y+), SC/IM, 0.5mL 04/20/2011, 02/21/2014    TDaP, ADACEL (age 10y-64y), BOOSTRIX (age 10y+), IM, 0.5mL 02/21/2014    Td, unspecified formulation 04/20/2011       Active Problems:  Patient Active Problem List   Diagnosis Code    Coronary artery disease I25.10    Hyperlipidemia E78.5    Multifocal pneumonia J18.9    CHF (congestive heart failure) ([facility]) I50.9    Hypertension due to end stage renal disease caused by type 1 diabetes mellitus, on dialysis ([facility]) E10.22, I12.0, Z99.2, N18.6    Diabetic polyneuropathy associated with type 2 diabetes mellitus ([facility]) E11.42    Stenosis of carotid artery I65.29    PVD (peripheral vascular disease) ([facility]) I73.9    Diabetic nephropathy associated with type 1 diabetes mellitus ([facility]) E10.21    H/O carotid angioplasty Z98.62    Seizure disorder ([facility]) G40.909    HTN (hypertension), benign I10    S/P CABG (coronary artery bypass graft) Z95.1    Chronic combined systolic and diastolic heart failure ([facility]) I50.42    ESRD on peritoneal dialysis ([facility]) N18.6, Z99.2    Moderate obstructive sleep apnea G47.33    Unstable angina ([facility]) I20.0    Peritonitis associated with peritoneal dialysis ([facility]) T85.71XA    Peritoneal dialysis catheter in place ([facility]) Z99.2    Poorly controlled type 1 diabetes mellitus ([facility]) E10.65    Overweight (BMI 25.0-29.9) E66.3    HFrEF (heart failure with reduced ejection fraction) ([facility]) I50.20    History of carotid stenosis Z86.79    History of stroke Z86.73    Diabetic ulcer of left foot ([facility]) E11.621, L97.529    A-fib ([facility]) I48.91    Anxiety disorder F41.9    Type 1 diabetes mellitus with chronic kidney disease on chronic dialysis ([facility]) E10.22, N18.6, Z99.2    History of peritonitis Z87.19

## 2024-07-01 NOTE — PROGRESS NOTES
Treatment time: 8 Hours 41 minutes  Net UF: -466 ml  Dwell Time: 10 minutes Gained    Treatment completed without complications or complaints from patient. Effluent clear and lines taped to patient per protocol. Patient resting comfortably with VSS upon exiting room.      Copy of dialysis treatment record placed in chart, to be scanned into EMR.

## 2024-07-01 NOTE — PROGRESS NOTES
Pt resting calmly in bed, c/o nausea, zofran had been given earlier this evening, cold compress applied to forehead, dilaudid given before dressing change to left foot, pt tolerated dressing change well and culture taken and sent to lab.   called me to inform me that the swab used can only be tested for aerobic, if MD wants anaerobic then a blue top swab will need to be used.

## 2024-07-02 PROBLEM — R07.9 CHEST PAIN: Status: RESOLVED | Noted: 2024-04-29 | Resolved: 2024-07-02

## 2024-07-02 PROBLEM — I20.0 WORSENING ANGINA (HCC): Status: RESOLVED | Noted: 2024-06-23 | Resolved: 2024-07-02

## 2024-07-02 LAB
ANION GAP SERPL CALCULATED.3IONS-SCNC: 13 MMOL/L (ref 3–16)
ANION GAP SERPL CALCULATED.3IONS-SCNC: 15 MMOL/L (ref 3–16)
BACTERIA SPEC AEROBE CULT: ABNORMAL
BUN SERPL-MCNC: 71 MG/DL (ref 7–20)
BUN SERPL-MCNC: 74 MG/DL (ref 7–20)
CALCIUM SERPL-MCNC: 8.2 MG/DL (ref 8.3–10.6)
CALCIUM SERPL-MCNC: 8.5 MG/DL (ref 8.3–10.6)
CHLORIDE SERPL-SCNC: 92 MMOL/L (ref 99–110)
CHLORIDE SERPL-SCNC: 93 MMOL/L (ref 99–110)
CO2 SERPL-SCNC: 27 MMOL/L (ref 21–32)
CO2 SERPL-SCNC: 30 MMOL/L (ref 21–32)
CREAT SERPL-MCNC: 12.7 MG/DL (ref 0.9–1.3)
CREAT SERPL-MCNC: 12.9 MG/DL (ref 0.9–1.3)
DEPRECATED RDW RBC AUTO: 14.4 % (ref 12.4–15.4)
GFR SERPLBLD CREATININE-BSD FMLA CKD-EPI: 4 ML/MIN/{1.73_M2}
GFR SERPLBLD CREATININE-BSD FMLA CKD-EPI: 4 ML/MIN/{1.73_M2}
GLUCOSE BLD-MCNC: 102 MG/DL (ref 70–99)
GLUCOSE BLD-MCNC: 139 MG/DL (ref 70–99)
GLUCOSE BLD-MCNC: 144 MG/DL (ref 70–99)
GLUCOSE BLD-MCNC: 355 MG/DL (ref 70–99)
GLUCOSE SERPL-MCNC: 111 MG/DL (ref 70–99)
GLUCOSE SERPL-MCNC: 174 MG/DL (ref 70–99)
GRAM STN SPEC: ABNORMAL
HCT VFR BLD AUTO: 40.1 % (ref 40.5–52.5)
HGB BLD-MCNC: 13.4 G/DL (ref 13.5–17.5)
MCH RBC QN AUTO: 29.9 PG (ref 26–34)
MCHC RBC AUTO-ENTMCNC: 33.4 G/DL (ref 31–36)
MCV RBC AUTO: 89.6 FL (ref 80–100)
PERFORMED ON: ABNORMAL
PLATELET # BLD AUTO: 139 K/UL (ref 135–450)
PMV BLD AUTO: 9.4 FL (ref 5–10.5)
POTASSIUM SERPL-SCNC: 3.6 MMOL/L (ref 3.5–5.1)
POTASSIUM SERPL-SCNC: 4 MMOL/L (ref 3.5–5.1)
RBC # BLD AUTO: 4.48 M/UL (ref 4.2–5.9)
SODIUM SERPL-SCNC: 134 MMOL/L (ref 136–145)
SODIUM SERPL-SCNC: 136 MMOL/L (ref 136–145)
WBC # BLD AUTO: 4.9 K/UL (ref 4–11)

## 2024-07-02 PROCEDURE — 6360000002 HC RX W HCPCS: Performed by: STUDENT IN AN ORGANIZED HEALTH CARE EDUCATION/TRAINING PROGRAM

## 2024-07-02 PROCEDURE — 6370000000 HC RX 637 (ALT 250 FOR IP): Performed by: INTERNAL MEDICINE

## 2024-07-02 PROCEDURE — 6360000002 HC RX W HCPCS: Performed by: INTERNAL MEDICINE

## 2024-07-02 PROCEDURE — 6360000002 HC RX W HCPCS: Performed by: NURSE PRACTITIONER

## 2024-07-02 PROCEDURE — 90945 DIALYSIS ONE EVALUATION: CPT

## 2024-07-02 PROCEDURE — 2580000003 HC RX 258: Performed by: INTERNAL MEDICINE

## 2024-07-02 PROCEDURE — 99233 SBSQ HOSP IP/OBS HIGH 50: CPT | Performed by: INTERNAL MEDICINE

## 2024-07-02 PROCEDURE — 1200000000 HC SEMI PRIVATE

## 2024-07-02 PROCEDURE — 6370000000 HC RX 637 (ALT 250 FOR IP): Performed by: NURSE PRACTITIONER

## 2024-07-02 PROCEDURE — 6370000000 HC RX 637 (ALT 250 FOR IP): Performed by: STUDENT IN AN ORGANIZED HEALTH CARE EDUCATION/TRAINING PROGRAM

## 2024-07-02 PROCEDURE — 80048 BASIC METABOLIC PNL TOTAL CA: CPT

## 2024-07-02 PROCEDURE — 36415 COLL VENOUS BLD VENIPUNCTURE: CPT

## 2024-07-02 PROCEDURE — 85027 COMPLETE CBC AUTOMATED: CPT

## 2024-07-02 PROCEDURE — 6370000000 HC RX 637 (ALT 250 FOR IP): Performed by: PODIATRIST

## 2024-07-02 RX ORDER — PROCHLORPERAZINE EDISYLATE 5 MG/ML
5 INJECTION INTRAMUSCULAR; INTRAVENOUS EVERY 6 HOURS PRN
Status: DISCONTINUED | OUTPATIENT
Start: 2024-07-02 | End: 2024-07-04 | Stop reason: HOSPADM

## 2024-07-02 RX ADMIN — SACUBITRIL AND VALSARTAN 1 TABLET: 49; 51 TABLET, FILM COATED ORAL at 08:41

## 2024-07-02 RX ADMIN — PROCHLORPERAZINE EDISYLATE 5 MG: 5 INJECTION INTRAMUSCULAR; INTRAVENOUS at 21:59

## 2024-07-02 RX ADMIN — HEPARIN SODIUM 5000 UNITS: 5000 INJECTION INTRAVENOUS; SUBCUTANEOUS at 05:05

## 2024-07-02 RX ADMIN — CARVEDILOL 25 MG: 25 TABLET, FILM COATED ORAL at 08:41

## 2024-07-02 RX ADMIN — LINEZOLID 600 MG: 600 TABLET, FILM COATED ORAL at 22:20

## 2024-07-02 RX ADMIN — ONDANSETRON 4 MG: 2 INJECTION INTRAMUSCULAR; INTRAVENOUS at 05:05

## 2024-07-02 RX ADMIN — RANOLAZINE 500 MG: 500 TABLET, EXTENDED RELEASE ORAL at 22:19

## 2024-07-02 RX ADMIN — HEPARIN SODIUM 5000 UNITS: 5000 INJECTION INTRAVENOUS; SUBCUTANEOUS at 14:54

## 2024-07-02 RX ADMIN — SACUBITRIL AND VALSARTAN 1 TABLET: 49; 51 TABLET, FILM COATED ORAL at 22:20

## 2024-07-02 RX ADMIN — LEVETIRACETAM 500 MG: 500 TABLET, FILM COATED ORAL at 08:42

## 2024-07-02 RX ADMIN — HEPARIN SODIUM 5000 UNITS: 5000 INJECTION INTRAVENOUS; SUBCUTANEOUS at 22:20

## 2024-07-02 RX ADMIN — SERTRALINE 50 MG: 50 TABLET, FILM COATED ORAL at 08:42

## 2024-07-02 RX ADMIN — ANTACID TABLETS 500 MG: 500 TABLET, CHEWABLE ORAL at 18:19

## 2024-07-02 RX ADMIN — LEVETIRACETAM 500 MG: 500 TABLET, FILM COATED ORAL at 22:20

## 2024-07-02 RX ADMIN — GENTAMICIN SULFATE: 1 CREAM TOPICAL at 12:53

## 2024-07-02 RX ADMIN — PIPERACILLIN AND TAZOBACTAM 3375 MG: 3; .375 INJECTION, POWDER, LYOPHILIZED, FOR SOLUTION INTRAVENOUS at 05:14

## 2024-07-02 RX ADMIN — CARVEDILOL 25 MG: 25 TABLET, FILM COATED ORAL at 22:20

## 2024-07-02 RX ADMIN — FUROSEMIDE 40 MG: 40 TABLET ORAL at 08:41

## 2024-07-02 RX ADMIN — RANOLAZINE 500 MG: 500 TABLET, EXTENDED RELEASE ORAL at 08:41

## 2024-07-02 RX ADMIN — Medication 1 CAPSULE: at 08:42

## 2024-07-02 RX ADMIN — ASPIRIN 81 MG 81 MG: 81 TABLET ORAL at 08:42

## 2024-07-02 RX ADMIN — ONDANSETRON 4 MG: 2 INJECTION INTRAMUSCULAR; INTRAVENOUS at 18:01

## 2024-07-02 RX ADMIN — SODIUM CHLORIDE, PRESERVATIVE FREE 10 ML: 5 INJECTION INTRAVENOUS at 22:00

## 2024-07-02 RX ADMIN — LINEZOLID 600 MG: 600 TABLET, FILM COATED ORAL at 08:42

## 2024-07-02 RX ADMIN — ROSUVASTATIN 40 MG: 20 TABLET, FILM COATED ORAL at 22:20

## 2024-07-02 RX ADMIN — Medication 1 CAPSULE: at 18:19

## 2024-07-02 RX ADMIN — CLOPIDOGREL BISULFATE 75 MG: 75 TABLET ORAL at 08:42

## 2024-07-02 RX ADMIN — FUROSEMIDE 40 MG: 40 TABLET ORAL at 18:19

## 2024-07-02 RX ADMIN — PIPERACILLIN AND TAZOBACTAM 3375 MG: 3; .375 INJECTION, POWDER, LYOPHILIZED, FOR SOLUTION INTRAVENOUS at 18:42

## 2024-07-02 RX ADMIN — MUPIROCIN: 20 OINTMENT TOPICAL at 12:53

## 2024-07-02 ASSESSMENT — PAIN SCALES - GENERAL: PAINLEVEL_OUTOF10: 4

## 2024-07-02 ASSESSMENT — ENCOUNTER SYMPTOMS
BACK PAIN: 0
RHINORRHEA: 0
SINUS PRESSURE: 0
WHEEZING: 0
CONSTIPATION: 0
SINUS PAIN: 0
EYE DISCHARGE: 0
COUGH: 0
DIARRHEA: 0
EYE REDNESS: 0
ABDOMINAL PAIN: 0
NAUSEA: 0
SHORTNESS OF BREATH: 0
SORE THROAT: 0

## 2024-07-02 ASSESSMENT — PAIN - FUNCTIONAL ASSESSMENT: PAIN_FUNCTIONAL_ASSESSMENT: ACTIVITIES ARE NOT PREVENTED

## 2024-07-02 ASSESSMENT — PAIN DESCRIPTION - LOCATION: LOCATION: FOOT

## 2024-07-02 ASSESSMENT — PAIN DESCRIPTION - PAIN TYPE: TYPE: CHRONIC PAIN

## 2024-07-02 ASSESSMENT — PAIN DESCRIPTION - ORIENTATION: ORIENTATION: LEFT

## 2024-07-02 ASSESSMENT — PAIN DESCRIPTION - DESCRIPTORS: DESCRIPTORS: ACHING

## 2024-07-02 NOTE — PROGRESS NOTES
bolus **OR** dextrose bolus, glucagon (rDNA), dextrose, melatonin      Problem list:       Patient Active Problem List   Diagnosis Code    Coronary artery disease I25.10    Hyperlipidemia E78.5    Multifocal pneumonia J18.9    CHF (congestive heart failure) ([facility]) I50.9    Hypertension due to end stage renal disease caused by type 1 diabetes mellitus, on dialysis ([facility]) E10.22, I12.0, Z99.2, N18.6    Diabetic polyneuropathy associated with type 2 diabetes mellitus ([facility]) E11.42    Stenosis of carotid artery I65.29    PVD (peripheral vascular disease) ([facility]) I73.9    Diabetic nephropathy associated with type 1 diabetes mellitus ([facility]) E10.21    H/O carotid angioplasty Z98.62    Seizure disorder ([facility]) G40.909    HTN (hypertension), benign I10    S/P CABG (coronary artery bypass graft) Z95.1    Chronic combined systolic and diastolic heart failure ([facility]) I50.42    ESRD on peritoneal dialysis ([facility]) N18.6, Z99.2    Moderate obstructive sleep apnea G47.33    Unstable angina ([facility]) I20.0    Peritonitis associated with peritoneal dialysis ([facility]) T85.71XA    Peritoneal dialysis catheter in place ([facility]) Z99.2    Poorly controlled type 1 diabetes mellitus ([facility]) E10.65    Overweight (BMI 25.0-29.9) E66.3    HFrEF (heart failure with reduced ejection fraction) ([facility]) I50.20    History of carotid stenosis Z86.79    History of stroke Z86.73    Diabetic ulcer of left foot ([facility]) E11.621, L97.529    A-fib ([facility]) I48.91    Anxiety disorder F41.9    Type 1 diabetes mellitus with chronic kidney disease on chronic dialysis ([facility]) E10.22, N18.6, Z99.2    History of peritonitis Z87.19    Pancytopenia ([facility]) D61.818    History of COVID-19 Z86.16    Acidosis E87.20    De Quervain thyroiditis E06.1    Non-compliant behavior R46.89    Hyperparathyroidism due to renal insufficiency ([facility]) N25.81    Abnormal stress test R94.39    Toe osteomyelitis, left ([facility]) M86.9    Diabetic foot infection ([facility]) E11.628, L08.9    Type 1 diabetes mellitus with diabetic foot  infection (HCC) E10.628, L08.9    Hypertrophic scar of skin L91.0    Charcot foot due to diabetes mellitus (HCC) E11.610    Diabetic polyneuropathy associated with type 1 diabetes mellitus (HCC) E10.42    Elevated sed rate R70.0       Please note that this chart was generated using Dragon dictation software. Although every effort was made to ensure the accuracy of this automated transcription, some errors in transcription may have occurred inadvertently. If you may need any clarification, please do not hesitate to contact me through EPIC or at the phone number provided below with my electronic signature.  Any pictures or media included in this note were obtained after taking informed verbal consent from the patient and with their approval to include those in the patient's medical record.        Indra Kinney MD, MPH, FACP, [facility]  7/2/2024, 10:02 PM  Central Office Phone: [phone]  Central Office Fax: [phone]    [facility]   [address], [address] ([address])  [place] [address]  [facility] days:  Tuesday & Thursday AM    [facility]  [address] , [address] (Medical office Building)  [place], [place], [address]  [facility] days: Wednesday AM

## 2024-07-02 NOTE — PROGRESS NOTES
[facility]   Nephrology progress Note  [phone]  [phone]   [employer]           SUMMARY :  We are following this patient for ESRD on PD   58-year-old male with past medical history of ESRD on peritoneal dialysis for at least 4-1/2 years, coronary artery disease s/p CABG, carotid artery stenosis s/p stent placed in right internal carotid artery, hypertension, hyperlipidemia, T1 diabetes diagnosed when he was 11 years old, nephrolithiasis, hyperlipidemia, presented with chest pain.  The chest pain was recurrent graded in intensity about 5 out of 10 and accompanied by some nausea there was no vomiting or diaphoresis there is no fever chills or rigors no cough no phlegm  He is on APD his PD effluent was clear      SUBJECTIVE:   Patient progress reviewed.   24 : Nausea     Physical Exam:    VITALS:  BP (!) 152/84   Pulse 84   Temp 98 °F (36.7 °C) (Oral)   Resp 16   Ht 1.727 m (5' 8\")   Wt 88 kg (194 lb 0.1 oz)   SpO2 99%   BMI 29.50 kg/m²   BLOOD PRESSURE RANGE:  Systolic (24hrs), Av , Min:111 , Max:154   ; Diastolic (24hrs), Av, Min:71, Max:98    24HR INTAKE/OUTPUT:    Intake/Output Summary (Last 24 hours) at 2024 1016  Last data filed at 2024 0724  Gross per 24 hour   Intake 529.74 ml   Output 400 ml   Net 129.74 ml         Gen:  alert, oriented x 3  PERRL , EOM +  Pallor +, No icterus  JVP not raised   CV: RRR no murmur or rub .  Midsternal scar of CABG  Lungs:B/ L air entry, Normal breath sounds   Abd: soft, bowel sounds + , No organomegaly .  PD catheter right side nontender exit site and tunnel, no discharge  Ext: No edema, no cyanosis  Skin: Warm.  No rash  Neuro: nonfocal.      DATA:    CBC with Differential:    Lab Results   Component Value Date/Time    WBC 4.9 2024 06:12 AM    RBC 4.48 2024 06:12 AM    HGB 13.4 2024 06:12 AM    HCT 40.1 2024 06:12 AM     2024 06:12 AM    MCV 89.6 2024 06:12 AM    MCH 29.9

## 2024-07-02 NOTE — PROGRESS NOTES
Treatment initiated without complications or complaints from patient. Patient resting comfortably with VSS upon dialysis RN exit from room. Tono Modi RN notified of start of treatment and hotline number located on top of machine for any questions about troubleshooting during after hours.

## 2024-07-02 NOTE — FLOWSHEET NOTE
07/02/24 0642   Vital Signs   /71   Temp 96.9 °F (36.1 °C)   Pulse 88   Respirations 16     Disconnected from [facility]CPD per protocol.    Effluent: Clear yellow without fibrin    Total time: 08:29   Total UF:  -118 ml.  Total Volume:  6885 ml.  Dwell time gained:  00:31    Pt Tolerated procedure: Without difficulty    Report given to: Harry Atkinson RN    Last BM: 7-1-24

## 2024-07-02 NOTE — PROGRESS NOTES
concern for osteomyelitis.  Podiatry not convinced for osteomyelitis based on MRI scans and suspect charcot's foot changes.  Patient was seen by ID and remains on antibiotics.  Mildly elevated ESR at 42  - Remains on antibiotics as per ID  - ID following with recommendation  - Podiatry following with recommendation  - Continue pain management      Unstable angina with underlying CAD: Patient presented with chest pain.  Had abnormal stress test.  Left heart cath noted for SVG graft in-stent stenosis status post left heart cath with stent placement.  Denies any chest pains or shortness of breath.  - Continue aspirin, clopidogrel, Coreg, ranolazine, rosuvastatin    Active Problems:    Chronic combined systolic and diastolic heart failure (HCC): Remains euvolemic.  No chest pains or shortness of breath.  Continue oral Lasix  - Monitor renal function and electrolytes on diuretics    ESRD on peritoneal dialysis (HCC): Dialysis as per nephrology    Type 1 diabetes mellitus with chronic kidney disease on chronic dialysis (HCC): on insulin pump    Diabetic polyneuropathy associated with type 1 diabetes mellitus (HCC)    Resolved Problems:    Chest pain    Worsening angina (HCC)          Medications:  Reviewed  Infusion Medications    sodium chloride      dextrose       Scheduled Medications    lactobacillus  1 capsule Oral BID WC    mupirocin   Topical BID    piperacillin-tazobactam  3,375 mg IntraVENous Q12H    linezolid  600 mg Oral 2 times per day    lactulose  20 g Oral Daily    sacubitril-valsartan  1 tablet Oral BID    heparin (porcine)  5,000 Units SubCUTAneous 3 times per day    Insulin Pump - Bolus Dose   SubCUTAneous 4x Daily AC & HS    Insulin Pump - Basal Dose   SubCUTAneous Daily    sodium chloride flush  5-40 mL IntraVENous 2 times per day    aspirin  81 mg Oral Daily    clopidogrel  75 mg Oral Daily    carvedilol  25 mg Oral BID    furosemide  40 mg Oral BID    levETIRAcetam  500 mg Oral BID    ranolazine  500  mg Oral BID    rosuvastatin  40 mg Oral Nightly    sertraline  50 mg Oral Daily    gentamicin   Topical Daily     PRN Meds: nitroGLYCERIN, HYDROmorphone **OR** HYDROmorphone, calcium carbonate, sodium chloride flush, sodium chloride, ondansetron **OR** ondansetron, acetaminophen **OR** acetaminophen, polyethylene glycol, dextrose bolus **OR** dextrose bolus, glucagon (rDNA), dextrose, melatonin      DVT Prophylaxis: Subcut heparin  Diet: ADULT DIET; Regular; 5 carb choices (75 gm/meal); Low Fat/Low Chol/High Fiber/RUBIA  ADULT ORAL NUTRITION SUPPLEMENT; Lunch; Renal Oral Supplement  Code Status: Full Code    Dispo: Anticipate discharge in 1 to 2 days    ____________________________________________________________________________    Subjective:   Overnight Events:   Uneventful overnight  No new complaints this morning        Physical Exam:  BP (!) 152/84   Pulse 84   Temp 98 °F (36.7 °C) (Oral)   Resp 16   Ht 1.727 m (5' 8\")   Wt 88 kg (194 lb 0.1 oz)   SpO2 99%   BMI 29.50 kg/m²   General appearance: No apparent distress, appears stated age and cooperative.  HEENT: Normocephalic, atraumatic, MMM, No sclera icterus/conjuctival palor  Neck: Supple, no thyromegally. No jugular venous distention.   Respiratory:  Normal respiratory effort. Clear to auscultation, no Rales/Wheezes/Rhonchi.  Cardiovascular: S1/S2 without murmurs, rubs or gallops. RRR  Abdomen: Soft, non-tender, non-distended, bowel sounds present.  Musculoskeletal: No clubbing, cyanosis or edema bilaterally.    Skin: Skin color, texture, turgor normal.  No rashes or lesions.  Neurologic:  Cranial nerves: II-XII intact, ETTA, No focal sensory/motor deficits  Psychiatric: Alert and oriented, thought content appropriate  Capillary Refill: Brisk,< 3 seconds   Peripheral Pulses: +2 palpable, equal bilaterally       Intake/Output Summary (Last 24 hours) at 7/2/2024 1237  Last data filed at 7/2/2024 0724  Gross per 24 hour   Intake 529.74 ml   Output 400 ml

## 2024-07-02 NOTE — PROGRESS NOTES
Pt comfortably resting in bed. /84, coreg given. Morning meds given per MAR. PWWW. AXOX4. Call light and bedside table in reach. Denies to eat his breakfast. Bed in the lowest position, locked and alarm on. The care plan and education has been reviewed and mutually agreed upon with the patient.

## 2024-07-02 NOTE — PLAN OF CARE
Problem: Cardiovascular - Adult  Goal: Maintains optimal cardiac output and hemodynamic stability  Outcome: Progressing     Problem: Skin/Tissue Integrity - Adult  Goal: Skin integrity remains intact  7/1/2024 2301 by Harry Vuong RN  Outcome: Progressing  7/1/2024 1011 by Ria Woodson RN  Outcome: Progressing  Flowsheets (Taken 7/1/2024 0000 by Te Bishop RN)  Skin Integrity Remains Intact: Monitor for areas of redness and/or skin breakdown  Goal: Incisions, wounds, or drain sites healing without S/S of infection  Outcome: Progressing     Problem: Musculoskeletal - Adult  Goal: Return mobility to safest level of function  Outcome: Progressing     Problem: Gastrointestinal - Adult  Goal: Minimal or absence of nausea and vomiting  Outcome: Progressing     Problem: Infection - Adult  Goal: Absence of infection at discharge  7/1/2024 2301 by Harry Vuong RN  Outcome: Progressing  7/1/2024 1011 by Ria Woodson RN  Outcome: Progressing  Flowsheets (Taken 7/1/2024 0000 by Te Bishop RN)  Absence of infection at discharge: Assess and monitor for signs and symptoms of infection     Problem: Metabolic/Fluid and Electrolytes - Adult  Goal: Electrolytes maintained within normal limits  7/1/2024 1011 by Ria Woodson RN  Outcome: Progressing  Flowsheets (Taken 7/1/2024 0000 by Te Bishop RN)  Electrolytes maintained within normal limits: Monitor labs and assess patient for signs and symptoms of electrolyte imbalances  Goal: Glucose maintained within prescribed range  Outcome: Progressing     Problem: ABCDS Injury Assessment  Goal: Absence of physical injury  Outcome: Progressing     Problem: Pain  Goal: Verbalizes/displays adequate comfort level or baseline comfort level  Outcome: Progressing     Problem: Chronic Conditions and Co-morbidities  Goal: Patient's chronic conditions and co-morbidity symptoms are monitored and maintained or improved  Outcome: Progressing     Problem: Safety -  Adult  Goal: Free from fall injury  Outcome: Progressing     Problem: Nutrition Deficit:  Goal: Optimize nutritional status  Outcome: Progressing     Problem: Discharge Planning  Goal: Discharge to home or other facility with appropriate resources  Outcome: Progressing

## 2024-07-03 LAB
ALBUMIN SERPL-MCNC: 3.2 G/DL (ref 3.4–5)
ANION GAP SERPL CALCULATED.3IONS-SCNC: 16 MMOL/L (ref 3–16)
BASOPHILS # BLD: 0.1 K/UL (ref 0–0.2)
BASOPHILS NFR BLD: 0.6 %
BUN SERPL-MCNC: 72 MG/DL (ref 7–20)
CALCIUM SERPL-MCNC: 8.7 MG/DL (ref 8.3–10.6)
CHLORIDE SERPL-SCNC: 92 MMOL/L (ref 99–110)
CO2 SERPL-SCNC: 25 MMOL/L (ref 21–32)
CREAT SERPL-MCNC: 12.6 MG/DL (ref 0.9–1.3)
DEPRECATED RDW RBC AUTO: 14.6 % (ref 12.4–15.4)
EOSINOPHIL # BLD: 0.2 K/UL (ref 0–0.6)
EOSINOPHIL NFR BLD: 2.5 %
GFR SERPLBLD CREATININE-BSD FMLA CKD-EPI: 4 ML/MIN/{1.73_M2}
GLUCOSE BLD-MCNC: 104 MG/DL (ref 70–99)
GLUCOSE BLD-MCNC: 134 MG/DL (ref 70–99)
GLUCOSE BLD-MCNC: 205 MG/DL (ref 70–99)
GLUCOSE BLD-MCNC: 95 MG/DL (ref 70–99)
GLUCOSE SERPL-MCNC: 249 MG/DL (ref 70–99)
HCT VFR BLD AUTO: 41.7 % (ref 40.5–52.5)
HGB BLD-MCNC: 13.4 G/DL (ref 13.5–17.5)
LYMPHOCYTES # BLD: 1.3 K/UL (ref 1–5.1)
LYMPHOCYTES NFR BLD: 16.2 %
MCH RBC QN AUTO: 29 PG (ref 26–34)
MCHC RBC AUTO-ENTMCNC: 32.1 G/DL (ref 31–36)
MCV RBC AUTO: 90.2 FL (ref 80–100)
MONOCYTES # BLD: 1.2 K/UL (ref 0–1.3)
MONOCYTES NFR BLD: 14.5 %
NEUTROPHILS # BLD: 5.4 K/UL (ref 1.7–7.7)
NEUTROPHILS NFR BLD: 66.2 %
PERFORMED ON: ABNORMAL
PERFORMED ON: NORMAL
PHOSPHATE SERPL-MCNC: 5.5 MG/DL (ref 2.5–4.9)
PLATELET # BLD AUTO: 163 K/UL (ref 135–450)
PMV BLD AUTO: 10.9 FL (ref 5–10.5)
POTASSIUM SERPL-SCNC: 4.4 MMOL/L (ref 3.5–5.1)
RBC # BLD AUTO: 4.62 M/UL (ref 4.2–5.9)
SODIUM SERPL-SCNC: 133 MMOL/L (ref 136–145)
WBC # BLD AUTO: 8.1 K/UL (ref 4–11)

## 2024-07-03 PROCEDURE — 36415 COLL VENOUS BLD VENIPUNCTURE: CPT

## 2024-07-03 PROCEDURE — 97165 OT EVAL LOW COMPLEX 30 MIN: CPT

## 2024-07-03 PROCEDURE — 85025 COMPLETE CBC W/AUTO DIFF WBC: CPT

## 2024-07-03 PROCEDURE — 6370000000 HC RX 637 (ALT 250 FOR IP): Performed by: INTERNAL MEDICINE

## 2024-07-03 PROCEDURE — 97162 PT EVAL MOD COMPLEX 30 MIN: CPT

## 2024-07-03 PROCEDURE — 6370000000 HC RX 637 (ALT 250 FOR IP): Performed by: STUDENT IN AN ORGANIZED HEALTH CARE EDUCATION/TRAINING PROGRAM

## 2024-07-03 PROCEDURE — 97530 THERAPEUTIC ACTIVITIES: CPT

## 2024-07-03 PROCEDURE — 2580000003 HC RX 258: Performed by: INTERNAL MEDICINE

## 2024-07-03 PROCEDURE — 6370000000 HC RX 637 (ALT 250 FOR IP): Performed by: NURSE PRACTITIONER

## 2024-07-03 PROCEDURE — 80069 RENAL FUNCTION PANEL: CPT

## 2024-07-03 PROCEDURE — 90945 DIALYSIS ONE EVALUATION: CPT

## 2024-07-03 PROCEDURE — 89050 BODY FLUID CELL COUNT: CPT

## 2024-07-03 PROCEDURE — 6360000002 HC RX W HCPCS: Performed by: INTERNAL MEDICINE

## 2024-07-03 PROCEDURE — 6360000002 HC RX W HCPCS: Performed by: STUDENT IN AN ORGANIZED HEALTH CARE EDUCATION/TRAINING PROGRAM

## 2024-07-03 PROCEDURE — 99232 SBSQ HOSP IP/OBS MODERATE 35: CPT | Performed by: INTERNAL MEDICINE

## 2024-07-03 PROCEDURE — 1200000000 HC SEMI PRIVATE

## 2024-07-03 RX ORDER — AMOXICILLIN AND CLAVULANATE POTASSIUM 500; 125 MG/1; MG/1
1 TABLET, FILM COATED ORAL
Status: DISCONTINUED | OUTPATIENT
Start: 2024-07-03 | End: 2024-07-04 | Stop reason: HOSPADM

## 2024-07-03 RX ADMIN — CLOPIDOGREL BISULFATE 75 MG: 75 TABLET ORAL at 09:14

## 2024-07-03 RX ADMIN — LINEZOLID 600 MG: 600 TABLET, FILM COATED ORAL at 09:15

## 2024-07-03 RX ADMIN — SACUBITRIL AND VALSARTAN 1 TABLET: 49; 51 TABLET, FILM COATED ORAL at 21:00

## 2024-07-03 RX ADMIN — LEVETIRACETAM 500 MG: 500 TABLET, FILM COATED ORAL at 21:00

## 2024-07-03 RX ADMIN — CARVEDILOL 25 MG: 25 TABLET, FILM COATED ORAL at 09:14

## 2024-07-03 RX ADMIN — RANOLAZINE 500 MG: 500 TABLET, EXTENDED RELEASE ORAL at 21:00

## 2024-07-03 RX ADMIN — RANOLAZINE 500 MG: 500 TABLET, EXTENDED RELEASE ORAL at 09:14

## 2024-07-03 RX ADMIN — PIPERACILLIN AND TAZOBACTAM 3375 MG: 3; .375 INJECTION, POWDER, LYOPHILIZED, FOR SOLUTION INTRAVENOUS at 06:49

## 2024-07-03 RX ADMIN — HEPARIN SODIUM 5000 UNITS: 5000 INJECTION INTRAVENOUS; SUBCUTANEOUS at 21:00

## 2024-07-03 RX ADMIN — LACTULOSE 20 G: 20 SOLUTION ORAL at 09:14

## 2024-07-03 RX ADMIN — ONDANSETRON 4 MG: 2 INJECTION INTRAMUSCULAR; INTRAVENOUS at 13:44

## 2024-07-03 RX ADMIN — ONDANSETRON 4 MG: 2 INJECTION INTRAMUSCULAR; INTRAVENOUS at 21:05

## 2024-07-03 RX ADMIN — Medication 1 CAPSULE: at 17:14

## 2024-07-03 RX ADMIN — Medication 1 CAPSULE: at 09:14

## 2024-07-03 RX ADMIN — LEVETIRACETAM 500 MG: 500 TABLET, FILM COATED ORAL at 09:15

## 2024-07-03 RX ADMIN — FUROSEMIDE 40 MG: 40 TABLET ORAL at 09:15

## 2024-07-03 RX ADMIN — SERTRALINE 50 MG: 50 TABLET, FILM COATED ORAL at 09:14

## 2024-07-03 RX ADMIN — SODIUM CHLORIDE, PRESERVATIVE FREE 10 ML: 5 INJECTION INTRAVENOUS at 20:53

## 2024-07-03 RX ADMIN — FUROSEMIDE 40 MG: 40 TABLET ORAL at 17:14

## 2024-07-03 RX ADMIN — ANTACID TABLETS 500 MG: 500 TABLET, CHEWABLE ORAL at 00:55

## 2024-07-03 RX ADMIN — CARVEDILOL 25 MG: 25 TABLET, FILM COATED ORAL at 21:00

## 2024-07-03 RX ADMIN — HYDROMORPHONE HYDROCHLORIDE 0.5 MG: 1 INJECTION, SOLUTION INTRAMUSCULAR; INTRAVENOUS; SUBCUTANEOUS at 05:57

## 2024-07-03 RX ADMIN — MUPIROCIN: 20 OINTMENT TOPICAL at 21:00

## 2024-07-03 RX ADMIN — ONDANSETRON 4 MG: 2 INJECTION INTRAMUSCULAR; INTRAVENOUS at 05:57

## 2024-07-03 RX ADMIN — AMOXICILLIN AND CLAVULANATE POTASSIUM 1 TABLET: 500; 125 TABLET, FILM COATED ORAL at 13:44

## 2024-07-03 RX ADMIN — HEPARIN SODIUM 5000 UNITS: 5000 INJECTION INTRAVENOUS; SUBCUTANEOUS at 06:49

## 2024-07-03 RX ADMIN — ROSUVASTATIN 40 MG: 20 TABLET, FILM COATED ORAL at 21:00

## 2024-07-03 RX ADMIN — HEPARIN SODIUM 5000 UNITS: 5000 INJECTION INTRAVENOUS; SUBCUTANEOUS at 13:44

## 2024-07-03 RX ADMIN — ASPIRIN 81 MG 81 MG: 81 TABLET ORAL at 09:15

## 2024-07-03 RX ADMIN — SACUBITRIL AND VALSARTAN 1 TABLET: 49; 51 TABLET, FILM COATED ORAL at 09:14

## 2024-07-03 ASSESSMENT — PAIN SCALES - GENERAL
PAINLEVEL_OUTOF10: 7
PAINLEVEL_OUTOF10: 3
PAINLEVEL_OUTOF10: 0
PAINLEVEL_OUTOF10: 7

## 2024-07-03 ASSESSMENT — PAIN DESCRIPTION - LOCATION
LOCATION: FOOT

## 2024-07-03 ASSESSMENT — ENCOUNTER SYMPTOMS
BACK PAIN: 0
COUGH: 0
CONSTIPATION: 0
NAUSEA: 0
SINUS PAIN: 0
SINUS PRESSURE: 0
SHORTNESS OF BREATH: 0
ABDOMINAL PAIN: 0
SORE THROAT: 0
DIARRHEA: 0
EYE REDNESS: 0
EYE DISCHARGE: 0
RHINORRHEA: 0
WHEEZING: 0

## 2024-07-03 ASSESSMENT — PAIN - FUNCTIONAL ASSESSMENT
PAIN_FUNCTIONAL_ASSESSMENT: PREVENTS OR INTERFERES WITH MANY ACTIVE NOT PASSIVE ACTIVITIES
PAIN_FUNCTIONAL_ASSESSMENT: PREVENTS OR INTERFERES SOME ACTIVE ACTIVITIES AND ADLS

## 2024-07-03 ASSESSMENT — PAIN DESCRIPTION - DESCRIPTORS
DESCRIPTORS: ACHING;DULL;DISCOMFORT
DESCRIPTORS: ACHING;DULL;DISCOMFORT

## 2024-07-03 ASSESSMENT — PAIN DESCRIPTION - ORIENTATION
ORIENTATION: LEFT

## 2024-07-03 ASSESSMENT — PAIN DESCRIPTION - FREQUENCY: FREQUENCY: CONTINUOUS

## 2024-07-03 ASSESSMENT — PAIN DESCRIPTION - PAIN TYPE
TYPE: ACUTE PAIN
TYPE: ACUTE PAIN

## 2024-07-03 ASSESSMENT — PAIN DESCRIPTION - ONSET: ONSET: ON-GOING

## 2024-07-03 ASSESSMENT — PAIN DESCRIPTION - DIRECTION: RADIATING_TOWARDS: L FOOT

## 2024-07-03 NOTE — PROGRESS NOTES
Hospitalist Progress Note      PCP: Reid Lei MD    Date of Admission: 6/23/2024    LOS: 10    Chief Complaint:   Chief Complaint   Patient presents with    Chest Pain     Pt. With CP tonight- mid sternal. Arrived by [facility] squad and pt. Given 324 asa en route.        Case Summary:   59-year-old gentleman with history of CAD, CHF, end-stage renal disease on peritoneal dialysis, type 1 diabetes, hypertension who presented with chest pain and abnormal stress test found to have SVG graft in-stent stenosis status post left heart catheter with stent placement.  He was complicated by left foot cellulitis.  There was a question of osteomyelitis involving the fifth metatarsal base with destructive erosive changes centered at first through third tarsometatarsal joint noted on MRI 6/29/2024.  However patient was seen by podiatry convinced for this to be more Charcot's foot changes.  Inflammatory markers were not robustly elevated with ESR of 42      Active Hospital Problems    Diagnosis Date Noted    Toe osteomyelitis, left (HCC) [M86.9] 07/01/2024    Diabetic foot infection (HCC) [E11.628, L08.9] 07/01/2024    Type 1 diabetes mellitus with diabetic foot infection (HCC) [E10.628, L08.9] 07/01/2024    Hypertrophic scar of skin [L91.0] 07/01/2024    Charcot foot due to diabetes mellitus (HCC) [E11.610] 07/01/2024    Diabetic polyneuropathy associated with type 1 diabetes mellitus (HCC) [E10.42] 07/01/2024    Elevated sed rate [R70.0] 07/01/2024    Abnormal stress test [R94.39] 06/23/2024    Type 1 diabetes mellitus with chronic kidney disease on chronic dialysis (HCC) [E10.22, N18.6, Z99.2] 04/16/2024    Diabetes education, encounter for [Z71.89] 07/05/2023    ESRD on peritoneal dialysis (HCC) [N18.6, Z99.2]     Chronic combined systolic and diastolic heart failure (HCC) [I50.42]     Stenosis of carotid artery [I65.29]     Coronary artery disease [I25.10] 05/26/2010         Principal Problem:    Diabetic foot  however, inadvertent computerized transcription errors may be present.

## 2024-07-03 NOTE — PROGRESS NOTES
Shift assessment completed, routine vital signs documented, and scheduled medication given. Patient hooked up and receiving peritoneal dialysis during shift. Patient c/o nausea and prn dose of antiemetic given prior to scheduled medications. Post antiemetic, nausea resolved and patient taking sips of fluid and bites of crackers. Patient AO x4 and denies additional nursing needs at this time. Call light and bedside table within reach; bed locked, in lowest position and armed.     Patient wife update during shift. Patient made aware.

## 2024-07-03 NOTE — PROGRESS NOTES
Treatment time: 8 Hours 41 minutes  Net UF: 1300 ml  Dwell Time: 31 minutes gained    Treatment completed without complications or complaints from patient. Effluent clear yellow and lines taped to patient per protocol. Patient resting comfortably with VSS upon exiting room.      Copy of dialysis treatment record placed in chart, to be scanned into EMR and report given to Adrian Liang RN.

## 2024-07-03 NOTE — PROGRESS NOTES
Infectious Diseases   Progress Note      Admission Date: 6/23/2024  Hospital Day: Hospital Day: 11   Attending: Dakotah Villalpando MD  Date of service: 7/3/2024     Chief complaint/ Reason for consult:     Complicated left diabetic foot infection  Longstanding type 1 diabetes mellitus  Diabetic neuropathy  Coronary artery disease, s/p CABG  S/p left heart coronary angiography on 6/27/2024 with stent placement     Microbiology:        I have reviewed allavailable micro lab data and cultures    Left foot wound culture - collected on 6/26/2024: In process    Culture, Wound  Order: [id number]  Status: Final result       Visible to patient: Yes (not seen)       Next appt: 07/05/2024 at 11:00 AM in Cardiology (JAYLEN Frost CNP)    Specimen Information: Foot   0 Result Notes       Component  Resulting Agency   WOUND/ABSCESS Moderate growth Mixed skin yonathan,  No further workup [facility]   Gram Stain Result  Abnormal   1+ WBC's (Polymorphonuclear)  1+ Gram positive cocci  No Epithelial Cells seen  [facility]              Narrative  Performed by: [facility]  ORDER#: [id number]                          ORDERED BY: COLT ERNANDEZ  SOURCE: Foot                               COLLECTED:  06/26/24 09:50  ANTIBIOTICS AT PEGGY.:                      RECEIVED :  06/26/24 10:43      Specimen Collected: 06/26/24 09:50 EDT Last Resulted: 06/28/24 07:43 EDT             Left foot wound culture  - collected on 6/30/2024: In process    Culture, Wound  Order: [id number]  Status: In process       Visible to patient: No (not released)       Next appt: 07/05/2024 at 11:00 AM in Cardiology (JAYLEN Frost CNP)    Specimen Information: Foot   0 Result Notes      Component    Gram Stain Result 2+ Gram negative rods  3+ Gram positive cocci  No Epithelial Cells seen   Resulting Agency [facility]              Narrative  Performed by: [facility]  ORDER#:  (rDNA), dextrose, melatonin      Problem list:       Patient Active Problem List   Diagnosis Code    Coronary artery disease I25.10    Hyperlipidemia E78.5    Multifocal pneumonia J18.9    CHF (congestive heart failure) ([facility]) I50.9    Hypertension due to end stage renal disease caused by type 1 diabetes mellitus, on dialysis ([facility]) E10.22, I12.0, Z99.2, N18.6    Diabetic polyneuropathy associated with type 2 diabetes mellitus ([facility]) E11.42    Stenosis of carotid artery I65.29    PVD (peripheral vascular disease) ([facility]) I73.9    Diabetic nephropathy associated with type 1 diabetes mellitus ([facility]) E10.21    H/O carotid angioplasty Z98.62    Seizure disorder ([facility]) G40.909    HTN (hypertension), benign I10    S/P CABG (coronary artery bypass graft) Z95.1    Chronic combined systolic and diastolic heart failure ([facility]) I50.42    ESRD on peritoneal dialysis ([facility]) N18.6, Z99.2    Moderate obstructive sleep apnea G47.33    Unstable angina ([facility]) I20.0    Peritonitis associated with peritoneal dialysis ([facility]) T85.71XA    Peritoneal dialysis catheter in place ([facility]) Z99.2    Poorly controlled type 1 diabetes mellitus ([facility]) E10.65    Overweight (BMI 25.0-29.9) E66.3    HFrEF (heart failure with reduced ejection fraction) ([facility]) I50.20    History of carotid stenosis Z86.79    History of stroke Z86.73    Diabetic ulcer of left foot ([facility]) E11.621, L97.529    Diabetes education, encounter for Z71.89    A-fib ([facility]) I48.91    Anxiety disorder F41.9    Type 1 diabetes mellitus with chronic kidney disease on chronic dialysis ([facility]) E10.22, N18.6, Z99.2    History of peritonitis Z87.19    Pancytopenia ([facility]) D61.818    History of COVID-19 Z86.16    Acidosis E87.20    De Quervain thyroiditis E06.1    Non-compliant behavior R46.89    Hyperparathyroidism due to renal insufficiency ([facility]) N25.81    Abnormal stress test R94.39    Toe osteomyelitis, left ([facility]) M86.9    Diabetic foot infection ([facility]) E11.628, L08.9    Type 1 diabetes mellitus with diabetic

## 2024-07-03 NOTE — PROGRESS NOTES
procedure (N/A, 6/27/2024).  Discharge Recommendations: Te Garay scored a 18/24 on the AM-PAC short mobility form. Current research shows that an AM-PAC score of 18 or greater is typically associated with a discharge to the patient's home setting. Based on the patient's AM-PAC score and their current functional mobility deficits, it is recommended that the patient have 2-3 sessions per week of Physical Therapy at d/c to increase the patient's independence.  At this time, this patient demonstrates the endurance and safety to discharge home with home health PT and a follow up treatment frequency of 2-3x/wk.  Please see assessment section for further patient specific details.  HOME HEALTH CARE: LEVEL 1 STANDARD    - Initial home health evaluation to occur within 24-48 hours, in patient home   - Therapy to evaluate with goal of regaining prior level of functioning   - Therapy to evaluate if patient has Home Health Aide needs for personal care      If patient discharges prior to next session this note will serve as a discharge summary.  Please see below for the latest assessment towards goals.         DME Required For Discharge: patient has all required DME for discharge  Precautions/Restrictions: low fall risk, weight bearing, up as tolerated  Weight Bearing Restrictions: weight bearing as tolerated  [] Right Upper Extremity  [] Left Upper Extremity [] Right Lower Extremity  [x] Left Lower Extremity     Required Braces/Orthotics: no braces required   [] Right  [] Left  Positional Restrictions:no positional restrictions    Pre-Admission Information   Lives With: spouse, son       (spouse works from home and can provide 24/7 supervision/assistance as needed. Pt reports he can stay on the main level of the home at d/c)               Type of Home: house  Home Layout: tri-level, bedroom/bathroom upstairs, 6-7 stairs to 2nd level with L HR  Home Access:  2 step to enter without rails   Bathroom Layout: walk in  he stands it goes up to a 5/10   Pain Interventions: pain medication in place prior to arrival, repositioned , and therapy activities modified       Functional Mobility  Bed Mobility:  Supine to Sit: stand by assistance  Scooting: stand by assistance  Comments: HOB elevated ~45 degrees  Transfers:  Sit to stand transfer: minimal assistance  Stand to sit transfer: minimal assistance  Bed to chair transfer: contact guard assistance  Comments: STS performed 2x total during session. Pt responded appropriately to verbal cues for performing transfer with weight bearing restrictions and for management of RW. Pt required min A to perform sit>stand and stand>sit transfers due to increased pain of L foot and dizziness.  Ambulation:  Ambulation not tested on this date secondary to increased pain and dizziness with activity.  Distance: n/a  Gait Mechanics: n/a  Comments:    Stair Mobility:  Stair mobility not completed on this date.  Comments:  Wheelchair Mobility:  No w/c mobility completed on this date.  Comments:  Balance:  Static Sitting Balance: fair (+): maintains balance at SBA/supervision without use of UE support  Dynamic Sitting Balance: fair: maintains balance at CGA without use of UE support  Static Standing Balance: fair (-): maintains balance at CGA with use of UE support  Dynamic Standing Balance: fair (-): maintains balance at CGA with use of UE support  Comments:    Other Therapeutic Interventions  Ankle pumps performed ~1 min in sitting    Vitals   BP assessed during session due to pt reporting dizziness upon initial sup > sit transition.   Sitting EOB - 110/72 & HR in 80s  Standing - 128/86 & HR in 80s  Sitting EOB after standing - 110/75   Post transfer to bedside chair - 96-66  Sitting bedside chair (recovery/following elevation of B LE's and ankle pumps ) - 112/79 & HR in 80s    Symptoms subsided with increased time/deep breathing.     Functional Outcomes  AM-PAC Inpatient Mobility Raw Score : 18

## 2024-07-03 NOTE — CARE COORDINATION
CM reviewed chart for discharge planning and spoke to Selena with [employer] [phone] and pt is 100% covered for IV abx's.    If pt needs HC he would like [facility]. CM spoke to Cindy with [facility]  [phone] and they can accept pt back if services needed.    Therapy is pending at this time.     Per last ID note: If podiatry team is convinced that the MRI changes are due to Charcot arthropathy rather than an active osteomyelitis, recommend switching from IV Zosyn to oral Augmentin 500 mg daily at discharge. Recommend 10-day course of oral Augmentin in that case     CM will continue to follow for discharge plan and needs.    Radha Browning RN, BSN  [phone]

## 2024-07-03 NOTE — PROGRESS NOTES
[facility] - Inpatient Rehabilitation Department   Phone: [phone]    Occupational Therapy    [x] Initial Evaluation            [] Daily Treatment Note         [] Discharge Summary      Patient: Te Garay   : 1965   MRN: [id number]   Date of Service:  7/3/2024    Admitting Diagnosis:  Diabetic foot infection (HCC)  Current Admission Summary: Te Garay is a 59 y.o. male  with PMHx of CAD; s/p CABG in , recent PCI& stent HFpEF, ESRD on PD, hypertension, hyperlipidemia diabetes mellitus, and seizure disorder who presented with chest pain. Patient stated that he woke up in the middle of night with sudden onset of chest pain. Initially thought it was indigestion and tried to go back to sleep but pain recurred after 20 minutes.  Patient described chest pain as pressure,2/10 in intensity, associated with diaphoresis, nausea and vomiting.  Patient also reported left foot pain but but denied any fever, chills, palpitations, SOB,  orthopnea or abdominal pain.  On admission, patient was hemodynamically stable.  Initial diagnostic lab work showed creatinine: 12, BUN: 54, B .  EKG negative for acute ischemic pathology. CXR negative for acute pathology.  X-ray left foot showed soft tissue along the lateral aspect of foot suggestive of cellulitis.  No evidence of osteomyelitis.   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  Past Surgical History:  has a past surgical history that includes Cardiac surgery  Time     Individual Group Co-treatment   Time In    0936   Time Out    1029   Minutes    53      Timed Code Treatment Minutes:   38 Minutes  Total Treatment Minutes:  53 Minutes     Electronically Signed By: JOSÉ MIGUEL Garcia/ZULEYMA, OTR/L- [id number]

## 2024-07-03 NOTE — PLAN OF CARE
Problem: Neurosensory - Adult  Goal: Achieves stable or improved neurological status  Outcome: Progressing  Flowsheets (Taken 7/3/2024 0038)  Achieves stable or improved neurological status:   Monitor temperature, glucose, and sodium. Initiate appropriate interventions as ordered   Assess for and report changes in neurological status

## 2024-07-03 NOTE — PLAN OF CARE
Problem: Neurosensory - Adult  Goal: Achieves stable or improved neurological status  7/3/2024 1012 by Adrian Liang, RN  Outcome: Progressing  7/3/2024 0038 by Diann Youngblood RN  Outcome: Progressing  Flowsheets (Taken 7/3/2024 0038)  Achieves stable or improved neurological status:   Monitor temperature, glucose, and sodium. Initiate appropriate interventions as ordered   Assess for and report changes in neurological status     Problem: Respiratory - Adult  Goal: Achieves optimal ventilation and oxygenation  Outcome: Progressing     Problem: Cardiovascular - Adult  Goal: Maintains optimal cardiac output and hemodynamic stability  Outcome: Progressing     Problem: Skin/Tissue Integrity - Adult  Goal: Skin integrity remains intact  Outcome: Progressing  Goal: Incisions, wounds, or drain sites healing without S/S of infection  Outcome: Progressing     Problem: Musculoskeletal - Adult  Goal: Return mobility to safest level of function  Outcome: Progressing     Problem: Gastrointestinal - Adult  Goal: Minimal or absence of nausea and vomiting  Outcome: Progressing     Problem: Infection - Adult  Goal: Absence of infection at discharge  Outcome: Progressing     Problem: Metabolic/Fluid and Electrolytes - Adult  Goal: Electrolytes maintained within normal limits  Outcome: Progressing  Goal: Glucose maintained within prescribed range  Outcome: Progressing     Problem: ABCDS Injury Assessment  Goal: Absence of physical injury  Outcome: Progressing     Problem: Pain  Goal: Verbalizes/displays adequate comfort level or baseline comfort level  Outcome: Progressing     Problem: Chronic Conditions and Co-morbidities  Goal: Patient's chronic conditions and co-morbidity symptoms are monitored and maintained or improved  Outcome: Progressing     Problem: Safety - Adult  Goal: Free from fall injury  Outcome: Progressing     Problem: Nutrition Deficit:  Goal: Optimize nutritional status  Outcome: Progressing     Problem:

## 2024-07-03 NOTE — PROGRESS NOTES
Dressing changed to the wound site on the left foot per order. Small drainage noted from the site. Pt tolerated the dressing change well. Complain of bearable pain.

## 2024-07-03 NOTE — PROGRESS NOTES
Pt refused dressing change to L foot. States RN can try later tonight. Will let night RN know.    Adrian Liang RN, BSN

## 2024-07-03 NOTE — PROGRESS NOTES
Shift assessment completed. Routine vitals stable. Scheduled medications given except lactulose, pt refused. Patient is awake, alert and oriented x4. Respirations are easy and unlabored. Patient does not appear to be in distress, resting comfortably at this time. Call light within reach. Denies needs at this time. Pain 3/10 declines PRN at this time.    Adrian Liang RN, BSN

## 2024-07-03 NOTE — PROGRESS NOTES
[facility]   Nephrology progress Note  [phone]  [phone]   [employer]           SUMMARY :  We are following this patient for ESRD on PD   58-year-old male with past medical history of ESRD on peritoneal dialysis for at least 4-1/2 years, coronary artery disease s/p CABG, carotid artery stenosis s/p stent placed in right internal carotid artery, hypertension, hyperlipidemia, T1 diabetes diagnosed when he was 11 years old, nephrolithiasis, hyperlipidemia, presented with chest pain.  The chest pain was recurrent graded in intensity about 5 out of 10 and accompanied by some nausea there was no vomiting or diaphoresis there is no fever chills or rigors no cough no phlegm  He is on APD his PD effluent was clear      SUBJECTIVE:   Patient progress reviewed.   24 : Nausea   7/3/24: less nauseous     Physical Exam:    VITALS:  /79   Pulse 79   Temp 98 °F (36.7 °C) (Oral)   Resp 16   Ht 1.727 m (5' 8\")   Wt 88.1 kg (194 lb 3.2 oz)   SpO2 98%   BMI 29.53 kg/m²   BLOOD PRESSURE RANGE:  Systolic (24hrs), Av , Min:110 , Max:161   ; Diastolic (24hrs), Av, Min:67, Max:90    24HR INTAKE/OUTPUT:    Intake/Output Summary (Last 24 hours) at 7/3/2024 0955  Last data filed at 2024 1200  Gross per 24 hour   Intake 120 ml   Output 750 ml   Net -630 ml         Gen:  alert, oriented x 3  PERRL , EOM +  Pallor +, No icterus  JVP not raised   CV: RRR no murmur or rub .  Midsternal scar of CABG  Lungs:B/ L air entry, Normal breath sounds   Abd: soft, bowel sounds + , No organomegaly .  PD catheter right side nontender exit site and tunnel, no discharge  Ext: No edema, no cyanosis  Skin: Warm.  No rash  Neuro: nonfocal.      DATA:    CBC with Differential:    Lab Results   Component Value Date/Time    WBC 8.1 2024 06:09 AM    RBC 4.62 2024 06:09 AM    HGB 13.4 2024 06:09 AM    HCT 41.7 2024 06:09 AM     2024 06:09 AM    MCV 90.2 2024  06:09 AM    MCH 29.0 07/03/2024 06:09 AM    MCHC 32.1 07/03/2024 06:09 AM    RDW 14.6 07/03/2024 06:09 AM    LYMPHOPCT 16.2 07/03/2024 06:09 AM    MONOPCT 14.5 07/03/2024 06:09 AM    EOSPCT 2.5 07/03/2024 06:09 AM    BASOPCT 0.6 07/03/2024 06:09 AM    MONOSABS 1.2 07/03/2024 06:09 AM    LYMPHSABS 1.3 07/03/2024 06:09 AM    EOSABS 0.2 07/03/2024 06:09 AM    BASOSABS 0.1 07/03/2024 06:09 AM    DIFFTYPE Scan-K 06/15/2013 01:00 AM     CMP:    Lab Results   Component Value Date/Time     07/03/2024 06:09 AM    K 4.4 07/03/2024 06:09 AM    K 4.0 06/26/2024 04:15 AM    CL 92 07/03/2024 06:09 AM    CO2 25 07/03/2024 06:09 AM    BUN 72 07/03/2024 06:09 AM    CREATININE 12.6 07/03/2024 06:09 AM    GFRAA 9 10/03/2022 04:45 AM    GFRAA 44 06/15/2013 01:00 AM    AGRATIO 1.3 06/23/2024 04:03 AM    LABGLOM 4 07/03/2024 06:09 AM    LABGLOM 6 04/30/2024 04:51 AM    GLUCOSE 249 07/03/2024 06:09 AM    CALCIUM 8.7 07/03/2024 06:09 AM    BILITOT 0.4 06/23/2024 04:03 AM    ALKPHOS 97 06/23/2024 04:03 AM    AST 13 06/23/2024 04:03 AM    ALT 11 06/23/2024 04:03 AM     Phosphorus:    Lab Results   Component Value Date/Time    PHOS 5.5 07/03/2024 06:09 AM     Last 3 Troponin:    Lab Results   Component Value Date/Time    TROPONINI 0.10 10/01/2022 02:20 PM    TROPONINI 0.05 10/01/2022 03:56 AM    TROPONINI 0.06 05/20/2022 05:18 PM     U/A:    Lab Results   Component Value Date/Time    COLORU Yellow 04/19/2024 10:30 AM    PROTEINU 100 04/19/2024 10:30 AM    PHUR 5.0 04/19/2024 10:30 AM    WBCUA 3 04/19/2024 10:30 AM    RBCUA 2 04/19/2024 10:30 AM    MUCUS 1+ 05/25/2010 07:19 PM    BACTERIA None Seen 04/19/2024 10:30 AM    CLARITYU CLOUDY 04/19/2024 10:30 AM    LEUKOCYTESUR Negative 04/19/2024 10:30 AM    UROBILINOGEN 0.2 04/19/2024 10:30 AM    BILIRUBINUR Negative 04/19/2024 10:30 AM    BILIRUBINUR NEGATIVE 05/25/2010 07:19 PM    BLOODU MODERATE 04/19/2024 10:30 AM    GLUCOSEU 500 04/19/2024 10:30 AM    GLUCOSEU >=1000 05/25/2010 07:19 PM

## 2024-07-04 VITALS
RESPIRATION RATE: 16 BRPM | DIASTOLIC BLOOD PRESSURE: 66 MMHG | TEMPERATURE: 98 F | SYSTOLIC BLOOD PRESSURE: 137 MMHG | HEART RATE: 80 BPM | HEIGHT: 68 IN | OXYGEN SATURATION: 98 % | WEIGHT: 194 LBS | BODY MASS INDEX: 29.4 KG/M2

## 2024-07-04 LAB
ALBUMIN SERPL-MCNC: 3.3 G/DL (ref 3.4–5)
ANION GAP SERPL CALCULATED.3IONS-SCNC: 14 MMOL/L (ref 3–16)
APPEARANCE FLUID: CLEAR
BACTERIA SPEC ANAEROBE CULT: ABNORMAL
BASOPHILS # BLD: 0 K/UL (ref 0–0.2)
BASOPHILS NFR BLD: 0.3 %
BDY FLUID QUALITY: NORMAL
BUN SERPL-MCNC: 67 MG/DL (ref 7–20)
CALCIUM SERPL-MCNC: 8.7 MG/DL (ref 8.3–10.6)
CELL COUNT FLUID TYPE: NORMAL
CHLORIDE SERPL-SCNC: 91 MMOL/L (ref 99–110)
CO2 SERPL-SCNC: 29 MMOL/L (ref 21–32)
COLOR FLUID: COLORLESS
CREAT SERPL-MCNC: 13.2 MG/DL (ref 0.9–1.3)
DEPRECATED RDW RBC AUTO: 14.6 % (ref 12.4–15.4)
DIFF PNL FLD: NORMAL
EOSINOPHIL # BLD: 0.3 K/UL (ref 0–0.6)
EOSINOPHIL NFR BLD: 2.6 %
GFR SERPLBLD CREATININE-BSD FMLA CKD-EPI: 4 ML/MIN/{1.73_M2}
GLUCOSE SERPL-MCNC: 148 MG/DL (ref 70–99)
HCT VFR BLD AUTO: 39.5 % (ref 40.5–52.5)
HGB BLD-MCNC: 12.6 G/DL (ref 13.5–17.5)
LYMPHOCYTES # BLD: 0.8 K/UL (ref 1–5.1)
LYMPHOCYTES NFR BLD: 7.9 %
MCH RBC QN AUTO: 28.9 PG (ref 26–34)
MCHC RBC AUTO-ENTMCNC: 31.9 G/DL (ref 31–36)
MCV RBC AUTO: 90.6 FL (ref 80–100)
MONOCYTES # BLD: 0.9 K/UL (ref 0–1.3)
MONOCYTES NFR BLD: 9.7 %
NEUTROPHILS # BLD: 7.7 K/UL (ref 1.7–7.7)
NEUTROPHILS NFR BLD: 79.5 %
NUC CELL # FLD: 1 /CUMM
ORGANISM: ABNORMAL
PHOSPHATE SERPL-MCNC: 5.5 MG/DL (ref 2.5–4.9)
PLATELET # BLD AUTO: 150 K/UL (ref 135–450)
PMV BLD AUTO: 10.7 FL (ref 5–10.5)
POTASSIUM SERPL-SCNC: 4.1 MMOL/L (ref 3.5–5.1)
RBC # BLD AUTO: 4.36 M/UL (ref 4.2–5.9)
RBC FLUID: 1 /CUMM
SODIUM SERPL-SCNC: 134 MMOL/L (ref 136–145)
WBC # BLD AUTO: 9.7 K/UL (ref 4–11)

## 2024-07-04 PROCEDURE — 6370000000 HC RX 637 (ALT 250 FOR IP): Performed by: STUDENT IN AN ORGANIZED HEALTH CARE EDUCATION/TRAINING PROGRAM

## 2024-07-04 PROCEDURE — 2580000003 HC RX 258: Performed by: INTERNAL MEDICINE

## 2024-07-04 PROCEDURE — 80069 RENAL FUNCTION PANEL: CPT

## 2024-07-04 PROCEDURE — 85025 COMPLETE CBC W/AUTO DIFF WBC: CPT

## 2024-07-04 PROCEDURE — 94660 CPAP INITIATION&MGMT: CPT

## 2024-07-04 PROCEDURE — 6370000000 HC RX 637 (ALT 250 FOR IP): Performed by: INTERNAL MEDICINE

## 2024-07-04 PROCEDURE — 36415 COLL VENOUS BLD VENIPUNCTURE: CPT

## 2024-07-04 PROCEDURE — 6360000002 HC RX W HCPCS: Performed by: STUDENT IN AN ORGANIZED HEALTH CARE EDUCATION/TRAINING PROGRAM

## 2024-07-04 PROCEDURE — 6360000002 HC RX W HCPCS: Performed by: INTERNAL MEDICINE

## 2024-07-04 RX ORDER — LACTOBACILLUS RHAMNOSUS GG 10B CELL
1 CAPSULE ORAL 2 TIMES DAILY WITH MEALS
Qty: 20 CAPSULE | Refills: 0 | Status: SHIPPED | OUTPATIENT
Start: 2024-07-04 | End: 2024-07-14

## 2024-07-04 RX ORDER — OXYCODONE AND ACETAMINOPHEN 7.5; 325 MG/1; MG/1
1 TABLET ORAL EVERY 8 HOURS PRN
Qty: 20 TABLET | Refills: 0 | Status: SHIPPED | OUTPATIENT
Start: 2024-07-04 | End: 2024-07-18

## 2024-07-04 RX ORDER — PROCHLORPERAZINE MALEATE 10 MG
10 TABLET ORAL EVERY 6 HOURS PRN
Qty: 120 TABLET | Refills: 0 | Status: SHIPPED | OUTPATIENT
Start: 2024-07-04

## 2024-07-04 RX ORDER — AMOXICILLIN AND CLAVULANATE POTASSIUM 500; 125 MG/1; MG/1
1 TABLET, FILM COATED ORAL
Qty: 10 TABLET | Refills: 0 | Status: SHIPPED | OUTPATIENT
Start: 2024-07-04 | End: 2024-07-14

## 2024-07-04 RX ADMIN — HEPARIN SODIUM 5000 UNITS: 5000 INJECTION INTRAVENOUS; SUBCUTANEOUS at 06:15

## 2024-07-04 RX ADMIN — FUROSEMIDE 40 MG: 40 TABLET ORAL at 09:28

## 2024-07-04 RX ADMIN — CARVEDILOL 25 MG: 25 TABLET, FILM COATED ORAL at 09:27

## 2024-07-04 RX ADMIN — AMOXICILLIN AND CLAVULANATE POTASSIUM 1 TABLET: 500; 125 TABLET, FILM COATED ORAL at 11:29

## 2024-07-04 RX ADMIN — ASPIRIN 81 MG 81 MG: 81 TABLET ORAL at 09:28

## 2024-07-04 RX ADMIN — Medication 1 CAPSULE: at 09:28

## 2024-07-04 RX ADMIN — RANOLAZINE 500 MG: 500 TABLET, EXTENDED RELEASE ORAL at 09:27

## 2024-07-04 RX ADMIN — ONDANSETRON 4 MG: 2 INJECTION INTRAMUSCULAR; INTRAVENOUS at 09:27

## 2024-07-04 RX ADMIN — SODIUM CHLORIDE, PRESERVATIVE FREE 10 ML: 5 INJECTION INTRAVENOUS at 09:27

## 2024-07-04 RX ADMIN — SACUBITRIL AND VALSARTAN 1 TABLET: 49; 51 TABLET, FILM COATED ORAL at 09:27

## 2024-07-04 RX ADMIN — SERTRALINE 50 MG: 50 TABLET, FILM COATED ORAL at 09:31

## 2024-07-04 RX ADMIN — LEVETIRACETAM 500 MG: 500 TABLET, FILM COATED ORAL at 09:27

## 2024-07-04 RX ADMIN — GENTAMICIN SULFATE: 1 CREAM TOPICAL at 09:52

## 2024-07-04 RX ADMIN — CLOPIDOGREL BISULFATE 75 MG: 75 TABLET ORAL at 09:27

## 2024-07-04 ASSESSMENT — PAIN SCALES - GENERAL: PAINLEVEL_OUTOF10: 0

## 2024-07-04 NOTE — PROGRESS NOTES
Data- discharge order received, pt verbalized agreement to discharge, needs for Home Health Care with [facility]  for pt/ot, CLOVER reviewed and signed by MD, to be completed by RN.    Action- AVS prepared, discharge instructions prepared and given to pt, medication information packet given r/t NEW or CHANGED prescriptions, pt verbalized understanding further self-review.   D/C instruction summary: Diet- regular, Activity- up as tolerated, follow up with Primary Care Physician Reid Lei -[phone] appointment in 1 week, immunizations reviewed and updated, medications prescriptions to be filled at pharmacy. Contact information provided to above agencies used.    Response- Case Management/ reported faxing completed CLOVER and AVS to needed HHC/DME services stated above.   Pt belongings gathered, IV removed, pt dressed. Disposition is home with HHC/DME as stated above, transported with RN, taken to lobby via w/c with daughter, no complications.     1. WEIGHT: Admit Weight - Scale: 86.2 kg (190 lb) (06/23/24 0343)        Today  Weight - Scale: 88 kg (194 lb) (07/04/24 0600)       2. O2 SAT.: SpO2: 98 % (07/04/24 0915)

## 2024-07-04 NOTE — PLAN OF CARE
Problem: Neurosensory - Adult  Goal: Achieves stable or improved neurological status  7/4/2024 1226 by Magalys Kumar RN  Outcome: Completed  7/4/2024 1104 by Magalys Kumar RN  Outcome: Progressing     Problem: Respiratory - Adult  Goal: Achieves optimal ventilation and oxygenation  7/4/2024 1226 by Magalys Kumar RN  Outcome: Completed  7/4/2024 1104 by Magalys Kumar RN  Outcome: Progressing     Problem: Cardiovascular - Adult  Goal: Maintains optimal cardiac output and hemodynamic stability  7/4/2024 1226 by Magalys Kumar RN  Outcome: Completed  7/4/2024 1104 by Magalys Kumar RN  Outcome: Progressing     Problem: Skin/Tissue Integrity - Adult  Goal: Skin integrity remains intact  7/4/2024 1226 by Magalys Kumar RN  Outcome: Completed  7/4/2024 1104 by Magalys Kumar RN  Outcome: Progressing  Goal: Incisions, wounds, or drain sites healing without S/S of infection  7/4/2024 1226 by Magalys Kumar RN  Outcome: Completed  7/4/2024 1104 by Magalys Kumar RN  Outcome: Progressing     Problem: Musculoskeletal - Adult  Goal: Return mobility to safest level of function  7/4/2024 1226 by Magalys Kumar RN  Outcome: Completed  7/4/2024 1104 by Magalys Kumar RN  Outcome: Progressing     Problem: Gastrointestinal - Adult  Goal: Minimal or absence of nausea and vomiting  7/4/2024 1226 by Magalys Kumar RN  Outcome: Completed  7/4/2024 1104 by Magalys Kumar RN  Outcome: Progressing     Problem: Infection - Adult  Goal: Absence of infection at discharge  7/4/2024 1226 by Magalys Kumar RN  Outcome: Completed  7/4/2024 1104 by Magalys Kumar RN  Outcome: Progressing     Problem: Metabolic/Fluid and Electrolytes - Adult  Goal: Electrolytes maintained within normal limits  7/4/2024 1226 by Magalys Kumar RN  Outcome: Completed  7/4/2024 1104 by Magalys Kumar RN  Outcome: Progressing  Goal: Glucose maintained within prescribed range  7/4/2024 1226 by Magalys Kumar RN  Outcome: Completed  7/4/2024 1104 by Kumar, Magalys, RN  Outcome:  Urinary catheter remains patent  7/4/2024 1226 by Magalys Kumar, RN  Outcome: Completed  7/4/2024 1104 by Magalys Kumar, RN  Outcome: Progressing

## 2024-07-04 NOTE — PLAN OF CARE
Problem: Neurosensory - Adult  Goal: Achieves stable or improved neurological status  7/4/2024 1104 by Magalys Kumar RN  Outcome: Progressing     Problem: Respiratory - Adult  Goal: Achieves optimal ventilation and oxygenation  7/4/2024 1104 by Magalys Kumar RN  Outcome: Progressing     Problem: Cardiovascular - Adult  Goal: Maintains optimal cardiac output and hemodynamic stability  7/4/2024 1104 by Magalys Kumar RN  Outcome: Progressing     Problem: Skin/Tissue Integrity - Adult  Goal: Skin integrity remains intact  7/4/2024 1104 by Magalys Kumar RN  Outcome: Progressing     Problem: Skin/Tissue Integrity - Adult  Goal: Incisions, wounds, or drain sites healing without S/S of infection  7/4/2024 1104 by Magalys Kumar RN  Outcome: Progressing

## 2024-07-04 NOTE — PROGRESS NOTES
Disconnected from [facility] per protocol.  Effluent: clear, no fibrin   Total time: 8 hr 42 min   Total UF:  635 ml.  Total Volume:  9027 ml.  Dwell time gained:  0 hr 16 min.  Pt Tolerated procedure: no alarms.      Cell count and culture drawn and walked to lab

## 2024-07-04 NOTE — PROGRESS NOTES
[facility]   Nephrology progress Note  [phone]  [phone]   [employer]           SUMMARY :  We are following this patient for ESRD on PD   58-year-old male with past medical history of ESRD on peritoneal dialysis for at least 4-1/2 years, coronary artery disease s/p CABG, carotid artery stenosis s/p stent placed in right internal carotid artery, hypertension, hyperlipidemia, T1 diabetes diagnosed when he was 11 years old, nephrolithiasis, hyperlipidemia, presented with chest pain.  The chest pain was recurrent graded in intensity about 5 out of 10 and accompanied by some nausea there was no vomiting or diaphoresis there is no fever chills or rigors no cough no phlegm  He is on APD his PD effluent was clear      SUBJECTIVE:   Patient progress reviewed.     Reports some L foot pain   No issues with CCPD overnight tolerated 4 exchanges  No CP     Physical Exam:    VITALS:  /66   Pulse 80   Temp 98 °F (36.7 °C)   Resp 16   Ht 1.727 m (5' 8\")   Wt 88 kg (194 lb)   SpO2 98%   BMI 29.50 kg/m²   BLOOD PRESSURE RANGE:  Systolic (24hrs), Av , Min:101 , Max:137   ; Diastolic (24hrs), Av, Min:64, Max:79    24HR INTAKE/OUTPUT:    Intake/Output Summary (Last 24 hours) at 2024 1104  Last data filed at 7/3/2024 1404  Gross per 24 hour   Intake 220 ml   Output --   Net 220 ml       Gen:  alert, oriented x 3  PERRL , EOM +  Pallor +, No icterus  JVP not raised   CV: RRR no murmur or rub .  Midsternal scar of CABG  Lungs:B/ L air entry, Normal breath sounds   Abd: soft, bowel sounds + , No organomegaly .  PD catheter right side nontender exit site and tunnel, no discharge  Ext: No edema, no cyanosis  Skin: Warm.  No rash  Neuro: nonfocal.      DATA:    CBC with Differential:    Lab Results   Component Value Date/Time    WBC 9.7 2024 05:54 AM    RBC 4.36 2024 05:54 AM    HGB 12.6 2024 05:54 AM    HCT 39.5 2024 05:54 AM     2024 05:54 AM

## 2024-07-04 NOTE — PROGRESS NOTES
Treatment initiated without complications or complaints from patient. Patient resting comfortably with VSS upon dialysis RN exit from room. Renata Scanlon RN notified of start of treatment and hotline number located on top of machine for any questions about troubleshooting during after hours.

## 2024-07-04 NOTE — PLAN OF CARE
Problem: Neurosensory - Adult  Goal: Achieves stable or improved neurological status  7/3/2024 2127 by Renata Scanlon RN  Outcome: Progressing     Problem: Respiratory - Adult  Goal: Achieves optimal ventilation and oxygenation  7/3/2024 2127 by Renata Scanlon RN  Outcome: Progressing     Problem: Cardiovascular - Adult  Goal: Maintains optimal cardiac output and hemodynamic stability  7/3/2024 2127 by Renata Scanlon RN  Outcome: Progressing     Problem: Skin/Tissue Integrity - Adult  Goal: Skin integrity remains intact  7/3/2024 2127 by Renata Scanlon RN  Outcome: Progressing     Problem: Skin/Tissue Integrity - Adult  Goal: Incisions, wounds, or drain sites healing without S/S of infection  7/3/2024 2127 by Renata Scanlon RN  Outcome: Progressing     Problem: Musculoskeletal - Adult  Goal: Return mobility to safest level of function  7/3/2024 2127 by Renata Scanlon RN  Outcome: Progressing     Problem: Gastrointestinal - Adult  Goal: Minimal or absence of nausea and vomiting  7/3/2024 2127 by Renata Scanlon RN  Outcome: Progressing     Problem: Infection - Adult  Goal: Absence of infection at discharge  7/3/2024 2127 by Renata Scanlon RN  Outcome: Progressing     Problem: Metabolic/Fluid and Electrolytes - Adult  Goal: Electrolytes maintained within normal limits  7/3/2024 2127 by Renata Scanlon RN  Outcome: Progressing     Problem: Metabolic/Fluid and Electrolytes - Adult  Goal: Glucose maintained within prescribed range  7/3/2024 2127 by Renata Scanlon RN  Outcome: Progressing     Problem: ABCDS Injury Assessment  Goal: Absence of physical injury  7/3/2024 2127 by Renata Scanlon RN  Outcome: Progressing     Problem: Pain  Goal: Verbalizes/displays adequate comfort level or baseline comfort level  7/3/2024 2127 by Renata Scanlon RN  Outcome: Progressing  Flowsheets (Taken 7/3/2024 2020)  Verbalizes/displays adequate comfort level or

## 2024-07-04 NOTE — CARE COORDINATION
Case Management -  Discharge Note      Patient Name: Te Garay                   YOB: 1965            Readmission Risk (Low < 19, Mod (19-27), High > 27): Readmission Risk Score: 33.9    Current PCP: Reid Lei MD    ([facility]) Important Message from Medicare:    Date: 7/4/24    PT AM-PAC: 18 /24  OT AM-PAC: 19 /24    Patient/patient representative has been educated on the benefits of HHC as well as the possible risks of declining recommended services. Patient/patient representative has acknowledged the information provided and decided on the following discharge plan. Patient/ patient representative has been provided freedom of choice regarding service provider, supported by basic dialogue that supports the patient's individualized plan of care/goals.    [facility]  [address],   [place] [address]  Phone: [phone]  Fax: [phone]     Financial    Payor: MEDICARE / Plan: MEDICARE PART A AND B / Product Type: *No Product type* /     Pharmacy:  Potential assistance Purchasing Medications: No  Meds-to-Beds request: Yes      [facility] - [place], [place] - [address] - P [phone] - F [phone]  [address]  [facility] [address]  Phone: [phone] Fax: [phone]    [id number] [place], [place] [address] [phone] - F [phone]  [address] [facility] [address]  Phone: [phone] Fax: [phone]    [id number] - [place], [place] - [address] [place] [address] [phone] - F [phone]  [address] [facility] [address]  Phone: [phone] Fax: [phone]      Notes:    Additional Case Management Notes: Patient will discharge home today with family. Patient will not have IV antibiotics on discharge. [facility]HC will follow. Patient does his PD at home. He has been advised to call his dialysis clinic if he needs to take antibiotics with PE. Patient verbalizes

## 2024-07-04 NOTE — CARE COORDINATION
07/04/24 1052   IMM Letter   IMM Letter given to Patient/Family/Significant other/Guardian/POA/by: Given to patient by Lilia SANCHEZ   IMM Letter date given: 07/04/24   IMM Letter time given: 1050     Electronically signed by Berry Gallagher on 7/4/24 at 10:53 AM EDT

## 2024-07-04 NOTE — DISCHARGE SUMMARY
Hospital Medicine Discharge Summary    Patient ID: Te Garay      Patient's PCP: Reid Lei MD    Admit Date: 6/23/2024     Discharge Date:   7/4/2024     Admitting Provider: Reva López MD     Discharge Provider: Dakotah Villalpando MD     Discharge Diagnoses:       Active Hospital Problems    Diagnosis     Toe osteomyelitis, left (HCC) [M86.9]     Diabetic foot infection (HCC) [E11.628, L08.9]     Type 1 diabetes mellitus with diabetic foot infection (HCC) [E10.628, L08.9]     Hypertrophic scar of skin [L91.0]     Charcot foot due to diabetes mellitus (HCC) [E11.610]     Diabetic polyneuropathy associated with type 1 diabetes mellitus (HCC) [E10.42]     Elevated sed rate [R70.0]     Abnormal stress test [R94.39]     Type 1 diabetes mellitus with chronic kidney disease on chronic dialysis (HCC) [E10.22, N18.6, Z99.2]     Diabetes education, encounter for [Z71.89]     ESRD on peritoneal dialysis (HCC) [N18.6, Z99.2]     Chronic combined systolic and diastolic heart failure (HCC) [I50.42]     Stenosis of carotid artery [I65.29]     Coronary artery disease [I25.10]        The patient was seen and examined on day of discharge and this discharge summary is in conjunction with any daily progress note from day of discharge.    Hospital Course:   The patient is a 59-year-old gentleman with history of CAD, CHF, end-stage renal disease on peritoneal dialysis, type 1 diabetes, hypertension who presented with chest pain and abnormal stress test found to have SVG graft in-stent stenosis status post left heart catheter with stent placement.  He was complicated by left foot cellulitis.  There was a question of osteomyelitis involving the fifth metatarsal base with destructive erosive changes centered at first through third tarsometatarsal joint noted on MRI 6/29/2024.  However patient was seen by podiatry convinced for this to be more Charcot's foot changes.  Inflammatory markers were not robustly

## 2024-07-05 LAB
GLUCOSE BLD-MCNC: 102 MG/DL (ref 70–99)
PERFORMED ON: ABNORMAL

## 2024-07-09 NOTE — PROGRESS NOTES
Physician Progress Note      PATIENT:               DIDIER RUVALCABA  CSN #:                  [id number]  :                       1965  ADMIT DATE:       2024 3:39 AM  DISCH DATE:        2024 12:21 PM  RESPONDING  PROVIDER #:        Dakotah Massey MD          QUERY TEXT:    Pt admitted with chest pain. Noted documentation of in-stent restenosis on    [facility] by ordered Cardiology consultant.  If possible, please document in   progress notes and discharge summary:    The medical record reflects the following:  Risk Factors: 59 year old male with hx CAD  Clinical Indicators:  Stress test- Perfusion Comments: LV perfusion is   abnormal. There is a moderate size, moderate intensity, minimally reversible   inferior lateral defect c/w scar and minimal ischemia.  Cardiology   progress note- Stress test c/w scar vs. Ischemia... symptoms similar to his   anginal symptoms.  IM progress note- Chest pain? NSTEMI.  d/c   summary- Unstable angina with underlying CAD: Patient presented with chest   pain.  Had abnormal stress test.  Left heart cath noted for SVG graft in-stent   stenosis status post left heart cath with stent placem  Treatment: Stress test, labs, LHC, aspirin, Plavix, Coreg, Ranexa, Crestor,   Cardiology consult  Options provided:  -- NSTEMI due to instent stenosis confirmed present on admission  -- NSTEMI due to instent stenosis ruled out  -- Defer to Cardiology consultant documentation regarding if NSTEMI due to   instent stenosis  -- Other - I will add my own diagnosis  -- Disagree - Not applicable / Not valid  -- Disagree - Clinically unable to determine / Unknown  -- Refer to Clinical Documentation Reviewer    PROVIDER RESPONSE TEXT:    The diagnosis of NSTEMI due to instent stenosis was confirmed as present on   admission.    Query created by: Laurel Gordon on 2024 12:14 PM      Electronically signed by:  Dakotah Massey MD 2024

## 2024-07-10 RX ORDER — CLOPIDOGREL BISULFATE 75 MG/1
75 TABLET ORAL DAILY
Qty: 90 TABLET | Refills: 2 | Status: SHIPPED | OUTPATIENT
Start: 2024-07-10

## 2024-07-10 NOTE — TELEPHONE ENCOUNTER
Medication:   Requested Prescriptions     Pending Prescriptions Disp Refills    clopidogrel (PLAVIX) 75 MG tablet [Pharmacy Med Name: CLOPIDOGREL 75MG TABLETS] 90 tablet 2     Sig: TAKE 1 TABLET BY MOUTH EVERY DAY.     Last Filled:  #90 with 2 refills on 8/12/23    Last appt: 1/18/2024   Next appt: Visit date not found    Last OARRS:        No data to display

## 2024-07-16 ENCOUNTER — APPOINTMENT (OUTPATIENT)
Dept: MRI IMAGING | Age: 59
DRG: 061 | End: 2024-07-16
Payer: MEDICARE

## 2024-07-16 ENCOUNTER — HOSPITAL ENCOUNTER (INPATIENT)
Age: 59
LOS: 6 days | Discharge: HOME | DRG: 061 | End: 2024-07-22
Attending: EMERGENCY MEDICINE | Admitting: HOSPITALIST
Payer: MEDICARE

## 2024-07-16 ENCOUNTER — APPOINTMENT (OUTPATIENT)
Dept: GENERAL RADIOLOGY | Age: 59
DRG: 061 | End: 2024-07-16
Payer: MEDICARE

## 2024-07-16 ENCOUNTER — APPOINTMENT (OUTPATIENT)
Dept: CT IMAGING | Age: 59
DRG: 061 | End: 2024-07-16
Payer: MEDICARE

## 2024-07-16 ENCOUNTER — APPOINTMENT (OUTPATIENT)
Age: 59
DRG: 061 | End: 2024-07-16
Attending: HOSPITALIST
Payer: MEDICARE

## 2024-07-16 ENCOUNTER — APPOINTMENT (OUTPATIENT)
Age: 59
DRG: 061 | End: 2024-07-16
Attending: PEDIATRICS
Payer: MEDICARE

## 2024-07-16 DIAGNOSIS — I63.9 ACUTE CVA (CEREBROVASCULAR ACCIDENT) (HCC): Primary | ICD-10-CM

## 2024-07-16 DIAGNOSIS — I63.429 CEREBROVASCULAR ACCIDENT (CVA) DUE TO EMBOLISM OF ANTERIOR CEREBRAL ARTERY, UNSPECIFIED BLOOD VESSEL LATERALITY (HCC): ICD-10-CM

## 2024-07-16 DIAGNOSIS — R07.9 CHEST PAIN, UNSPECIFIED TYPE: ICD-10-CM

## 2024-07-16 PROBLEM — G25.3 MYOCLONUS: Status: ACTIVE | Noted: 2024-07-16

## 2024-07-16 PROBLEM — Z92.82 RECEIVED INTRAVENOUS TISSUE PLASMINOGEN ACTIVATOR (TPA) IN EMERGENCY DEPARTMENT: Status: ACTIVE | Noted: 2024-07-16

## 2024-07-16 PROBLEM — E10.9 TYPE 1 DIABETES MELLITUS (HCC): Status: ACTIVE | Noted: 2024-07-16

## 2024-07-16 PROBLEM — I10 ESSENTIAL HYPERTENSION: Status: ACTIVE | Noted: 2019-01-12

## 2024-07-16 LAB
ALBUMIN SERPL-MCNC: 3.6 G/DL (ref 3.4–5)
ALBUMIN SERPL-MCNC: 3.7 G/DL (ref 3.4–5)
ALBUMIN/GLOB SERPL: 1.1 {RATIO} (ref 1.1–2.2)
ALBUMIN/GLOB SERPL: 1.2 {RATIO} (ref 1.1–2.2)
ALP SERPL-CCNC: 81 U/L (ref 40–129)
ALP SERPL-CCNC: 91 U/L (ref 40–129)
ALT SERPL-CCNC: 16 U/L (ref 10–40)
ALT SERPL-CCNC: 19 U/L (ref 10–40)
ANION GAP SERPL CALCULATED.3IONS-SCNC: 13 MMOL/L (ref 3–16)
ANION GAP SERPL CALCULATED.3IONS-SCNC: 17 MMOL/L (ref 3–16)
APTT BLD: 28.6 SEC (ref 22.1–36.4)
AST SERPL-CCNC: 14 U/L (ref 15–37)
AST SERPL-CCNC: 15 U/L (ref 15–37)
BASOPHILS # BLD: 0 K/UL (ref 0–0.2)
BASOPHILS # BLD: 0 K/UL (ref 0–0.2)
BASOPHILS NFR BLD: 0.2 %
BASOPHILS NFR BLD: 0.9 %
BILIRUB SERPL-MCNC: <0.2 MG/DL (ref 0–1)
BILIRUB SERPL-MCNC: <0.2 MG/DL (ref 0–1)
BUN SERPL-MCNC: 44 MG/DL (ref 7–20)
BUN SERPL-MCNC: 45 MG/DL (ref 7–20)
CALCIUM SERPL-MCNC: 9 MG/DL (ref 8.3–10.6)
CALCIUM SERPL-MCNC: 9.2 MG/DL (ref 8.3–10.6)
CHLORIDE SERPL-SCNC: 101 MMOL/L (ref 99–110)
CHLORIDE SERPL-SCNC: 102 MMOL/L (ref 99–110)
CO2 SERPL-SCNC: 21 MMOL/L (ref 21–32)
CO2 SERPL-SCNC: 26 MMOL/L (ref 21–32)
CREAT SERPL-MCNC: 7 MG/DL (ref 0.9–1.3)
CREAT SERPL-MCNC: 7.1 MG/DL (ref 0.9–1.3)
D-DIMER QUANTITATIVE: 0.63 UG/ML FEU (ref 0–0.6)
DEPRECATED RDW RBC AUTO: 14.7 % (ref 12.4–15.4)
DEPRECATED RDW RBC AUTO: 14.9 % (ref 12.4–15.4)
ECHO AO ASC DIAM: 2.7 CM
ECHO AO ASCENDING AORTA INDEX: 1.35 CM/M2
ECHO AO ROOT DIAM: 2.6 CM
ECHO AO ROOT INDEX: 1.3 CM/M2
ECHO BSA: 2.04 M2
ECHO LA AREA 4C: 20.2 CM2
ECHO LA DIAMETER INDEX: 2.05 CM/M2
ECHO LA DIAMETER: 4.1 CM
ECHO LA MAJOR AXIS: 5.7 CM
ECHO LA TO AORTIC ROOT RATIO: 1.58
ECHO LA VOL MOD A4C: 58 ML (ref 18–58)
ECHO LA VOLUME INDEX MOD A4C: 29 ML/M2 (ref 16–34)
ECHO LV EDV A2C: 156 ML
ECHO LV EDV A4C: 154 ML
ECHO LV EDV INDEX A4C: 77 ML/M2
ECHO LV EDV NDEX A2C: 78 ML/M2
ECHO LV EJECTION FRACTION A2C: 48 %
ECHO LV EJECTION FRACTION A4C: 24 %
ECHO LV EJECTION FRACTION BIPLANE: 39 % (ref 55–100)
ECHO LV ESV A2C: 81 ML
ECHO LV ESV A4C: 117 ML
ECHO LV ESV INDEX A2C: 41 ML/M2
ECHO LV ESV INDEX A4C: 59 ML/M2
ECHO LV FRACTIONAL SHORTENING: 7 % (ref 28–44)
ECHO LV INTERNAL DIMENSION DIASTOLE INDEX: 2.75 CM/M2
ECHO LV INTERNAL DIMENSION DIASTOLIC: 5.5 CM (ref 4.2–5.9)
ECHO LV INTERNAL DIMENSION SYSTOLIC INDEX: 2.55 CM/M2
ECHO LV INTERNAL DIMENSION SYSTOLIC: 5.1 CM
ECHO LV IVSD: 0.9 CM (ref 0.6–1)
ECHO LV MASS 2D: 185.8 G (ref 88–224)
ECHO LV MASS INDEX 2D: 92.9 G/M2 (ref 49–115)
ECHO LV POSTERIOR WALL DIASTOLIC: 0.9 CM (ref 0.6–1)
ECHO LV RELATIVE WALL THICKNESS RATIO: 0.33
ECHO LVOT AREA: 3.1 CM2
ECHO LVOT DIAM: 2 CM
ECHO MV MAX VELOCITY: 1.6 M/S
ECHO MV MEAN GRADIENT: 6 MMHG
ECHO MV MEAN VELOCITY: 1.2 M/S
ECHO MV PEAK GRADIENT: 10 MMHG
ECHO MV VTI: 24.4 CM
ECHO PV MAX VELOCITY: 1.5 M/S
ECHO PV PEAK GRADIENT: 9 MMHG
EKG ATRIAL RATE: 104 BPM
EKG ATRIAL RATE: 95 BPM
EKG DIAGNOSIS: NORMAL
EKG DIAGNOSIS: NORMAL
EKG P AXIS: 65 DEGREES
EKG P AXIS: 69 DEGREES
EKG P-R INTERVAL: 146 MS
EKG P-R INTERVAL: 148 MS
EKG Q-T INTERVAL: 358 MS
EKG Q-T INTERVAL: 366 MS
EKG QRS DURATION: 114 MS
EKG QRS DURATION: 118 MS
EKG QTC CALCULATION (BAZETT): 459 MS
EKG QTC CALCULATION (BAZETT): 470 MS
EKG R AXIS: 60 DEGREES
EKG R AXIS: 67 DEGREES
EKG T AXIS: 40 DEGREES
EKG T AXIS: 94 DEGREES
EKG VENTRICULAR RATE: 104 BPM
EKG VENTRICULAR RATE: 95 BPM
EOSINOPHIL # BLD: 0 K/UL (ref 0–0.6)
EOSINOPHIL # BLD: 0.2 K/UL (ref 0–0.6)
EOSINOPHIL NFR BLD: 0.3 %
EOSINOPHIL NFR BLD: 3.9 %
EST. AVERAGE GLUCOSE BLD GHB EST-MCNC: 182.9 MG/DL
GFR SERPLBLD CREATININE-BSD FMLA CKD-EPI: 8 ML/MIN/{1.73_M2}
GFR SERPLBLD CREATININE-BSD FMLA CKD-EPI: 8 ML/MIN/{1.73_M2}
GLUCOSE BLD-MCNC: 128 MG/DL (ref 70–99)
GLUCOSE BLD-MCNC: 230 MG/DL (ref 70–99)
GLUCOSE BLD-MCNC: 271 MG/DL (ref 70–99)
GLUCOSE BLD-MCNC: 285 MG/DL (ref 70–99)
GLUCOSE SERPL-MCNC: 165 MG/DL (ref 70–99)
GLUCOSE SERPL-MCNC: 216 MG/DL (ref 70–99)
HBA1C MFR BLD: 8 %
HCT VFR BLD AUTO: 31.9 % (ref 40.5–52.5)
HCT VFR BLD AUTO: 35.1 % (ref 40.5–52.5)
HGB BLD-MCNC: 10.4 G/DL (ref 13.5–17.5)
HGB BLD-MCNC: 11.1 G/DL (ref 13.5–17.5)
INR PPP: 1 (ref 0.85–1.15)
INR PPP: 1.03 (ref 0.85–1.15)
LACTATE BLDV-SCNC: 1.8 MMOL/L (ref 0.4–1.9)
LYMPHOCYTES # BLD: 0.6 K/UL (ref 1–5.1)
LYMPHOCYTES # BLD: 1.2 K/UL (ref 1–5.1)
LYMPHOCYTES NFR BLD: 25.2 %
LYMPHOCYTES NFR BLD: 7.9 %
MCH RBC QN AUTO: 29.4 PG (ref 26–34)
MCH RBC QN AUTO: 29.8 PG (ref 26–34)
MCHC RBC AUTO-ENTMCNC: 31.5 G/DL (ref 31–36)
MCHC RBC AUTO-ENTMCNC: 32.5 G/DL (ref 31–36)
MCV RBC AUTO: 91.8 FL (ref 80–100)
MCV RBC AUTO: 93.3 FL (ref 80–100)
MONOCYTES # BLD: 0.2 K/UL (ref 0–1.3)
MONOCYTES # BLD: 0.6 K/UL (ref 0–1.3)
MONOCYTES NFR BLD: 12.6 %
MONOCYTES NFR BLD: 2.2 %
NEUTROPHILS # BLD: 2.8 K/UL (ref 1.7–7.7)
NEUTROPHILS # BLD: 6.6 K/UL (ref 1.7–7.7)
NEUTROPHILS NFR BLD: 57.4 %
NEUTROPHILS NFR BLD: 89.4 %
PERFORMED ON: ABNORMAL
PLATELET # BLD AUTO: 197 K/UL (ref 135–450)
PLATELET # BLD AUTO: 205 K/UL (ref 135–450)
PMV BLD AUTO: 10 FL (ref 5–10.5)
PMV BLD AUTO: 9.8 FL (ref 5–10.5)
POTASSIUM SERPL-SCNC: 5 MMOL/L (ref 3.5–5.1)
POTASSIUM SERPL-SCNC: 5.2 MMOL/L (ref 3.5–5.1)
PROT SERPL-MCNC: 6.5 G/DL (ref 6.4–8.2)
PROT SERPL-MCNC: 7 G/DL (ref 6.4–8.2)
PROTHROMBIN TIME: 13.4 SEC (ref 11.9–14.9)
PROTHROMBIN TIME: 13.7 SEC (ref 11.9–14.9)
RBC # BLD AUTO: 3.48 M/UL (ref 4.2–5.9)
RBC # BLD AUTO: 3.77 M/UL (ref 4.2–5.9)
SODIUM SERPL-SCNC: 139 MMOL/L (ref 136–145)
SODIUM SERPL-SCNC: 141 MMOL/L (ref 136–145)
TROPONIN, HIGH SENSITIVITY: 113 NG/L (ref 0–22)
TROPONIN, HIGH SENSITIVITY: 114 NG/L (ref 0–22)
TROPONIN, HIGH SENSITIVITY: 114 NG/L (ref 0–22)
WBC # BLD AUTO: 4.9 K/UL (ref 4–11)
WBC # BLD AUTO: 7.3 K/UL (ref 4–11)

## 2024-07-16 PROCEDURE — 93005 ELECTROCARDIOGRAM TRACING: CPT | Performed by: PEDIATRICS

## 2024-07-16 PROCEDURE — 83605 ASSAY OF LACTIC ACID: CPT

## 2024-07-16 PROCEDURE — 97530 THERAPEUTIC ACTIVITIES: CPT

## 2024-07-16 PROCEDURE — 99285 EMERGENCY DEPT VISIT HI MDM: CPT

## 2024-07-16 PROCEDURE — 6370000000 HC RX 637 (ALT 250 FOR IP): Performed by: EMERGENCY MEDICINE

## 2024-07-16 PROCEDURE — 6360000002 HC RX W HCPCS: Performed by: STUDENT IN AN ORGANIZED HEALTH CARE EDUCATION/TRAINING PROGRAM

## 2024-07-16 PROCEDURE — 71275 CT ANGIOGRAPHY CHEST: CPT

## 2024-07-16 PROCEDURE — 70551 MRI BRAIN STEM W/O DYE: CPT

## 2024-07-16 PROCEDURE — 90945 DIALYSIS ONE EVALUATION: CPT

## 2024-07-16 PROCEDURE — 93005 ELECTROCARDIOGRAM TRACING: CPT | Performed by: EMERGENCY MEDICINE

## 2024-07-16 PROCEDURE — 80053 COMPREHEN METABOLIC PANEL: CPT

## 2024-07-16 PROCEDURE — 71045 X-RAY EXAM CHEST 1 VIEW: CPT

## 2024-07-16 PROCEDURE — 97162 PT EVAL MOD COMPLEX 30 MIN: CPT

## 2024-07-16 PROCEDURE — 85025 COMPLETE CBC W/AUTO DIFF WBC: CPT

## 2024-07-16 PROCEDURE — 97535 SELF CARE MNGMENT TRAINING: CPT

## 2024-07-16 PROCEDURE — 6360000002 HC RX W HCPCS: Performed by: PEDIATRICS

## 2024-07-16 PROCEDURE — 6360000002 HC RX W HCPCS

## 2024-07-16 PROCEDURE — 99223 1ST HOSP IP/OBS HIGH 75: CPT | Performed by: PSYCHIATRY & NEUROLOGY

## 2024-07-16 PROCEDURE — 93306 TTE W/DOPPLER COMPLETE: CPT

## 2024-07-16 PROCEDURE — 6370000000 HC RX 637 (ALT 250 FOR IP)

## 2024-07-16 PROCEDURE — 6370000000 HC RX 637 (ALT 250 FOR IP): Performed by: PEDIATRICS

## 2024-07-16 PROCEDURE — 83036 HEMOGLOBIN GLYCOSYLATED A1C: CPT

## 2024-07-16 PROCEDURE — C8929 TTE W OR WO FOL WCON,DOPPLER: HCPCS

## 2024-07-16 PROCEDURE — 92610 EVALUATE SWALLOWING FUNCTION: CPT

## 2024-07-16 PROCEDURE — 6370000000 HC RX 637 (ALT 250 FOR IP): Performed by: STUDENT IN AN ORGANIZED HEALTH CARE EDUCATION/TRAINING PROGRAM

## 2024-07-16 PROCEDURE — 6360000002 HC RX W HCPCS: Performed by: INTERNAL MEDICINE

## 2024-07-16 PROCEDURE — 97166 OT EVAL MOD COMPLEX 45 MIN: CPT

## 2024-07-16 PROCEDURE — 6360000004 HC RX CONTRAST MEDICATION: Performed by: HOSPITALIST

## 2024-07-16 PROCEDURE — APPNB30 APP NON BILLABLE TIME 0-30 MINS

## 2024-07-16 PROCEDURE — 85730 THROMBOPLASTIN TIME PARTIAL: CPT

## 2024-07-16 PROCEDURE — 96374 THER/PROPH/DIAG INJ IV PUSH: CPT

## 2024-07-16 PROCEDURE — 92526 ORAL FUNCTION THERAPY: CPT

## 2024-07-16 PROCEDURE — 6360000002 HC RX W HCPCS: Performed by: HOSPITALIST

## 2024-07-16 PROCEDURE — APPSS60 APP SPLIT SHARED TIME 46-60 MINUTES

## 2024-07-16 PROCEDURE — 2580000003 HC RX 258: Performed by: EMERGENCY MEDICINE

## 2024-07-16 PROCEDURE — 3E03317 INTRODUCTION OF OTHER THROMBOLYTIC INTO PERIPHERAL VEIN, PERCUTANEOUS APPROACH: ICD-10-PCS | Performed by: STUDENT IN AN ORGANIZED HEALTH CARE EDUCATION/TRAINING PROGRAM

## 2024-07-16 PROCEDURE — 6370000000 HC RX 637 (ALT 250 FOR IP): Performed by: HOSPITALIST

## 2024-07-16 PROCEDURE — 3E1M39Z IRRIGATION OF PERITONEAL CAVITY USING DIALYSATE, PERCUTANEOUS APPROACH: ICD-10-PCS | Performed by: STUDENT IN AN ORGANIZED HEALTH CARE EDUCATION/TRAINING PROGRAM

## 2024-07-16 PROCEDURE — 80061 LIPID PANEL: CPT

## 2024-07-16 PROCEDURE — 70450 CT HEAD/BRAIN W/O DYE: CPT

## 2024-07-16 PROCEDURE — 36415 COLL VENOUS BLD VENIPUNCTURE: CPT

## 2024-07-16 PROCEDURE — 70498 CT ANGIOGRAPHY NECK: CPT

## 2024-07-16 PROCEDURE — 2000000000 HC ICU R&B

## 2024-07-16 PROCEDURE — 93010 ELECTROCARDIOGRAM REPORT: CPT | Performed by: INTERNAL MEDICINE

## 2024-07-16 PROCEDURE — 2500000003 HC RX 250 WO HCPCS: Performed by: EMERGENCY MEDICINE

## 2024-07-16 PROCEDURE — 2500000003 HC RX 250 WO HCPCS: Performed by: PEDIATRICS

## 2024-07-16 PROCEDURE — 96375 TX/PRO/DX INJ NEW DRUG ADDON: CPT

## 2024-07-16 PROCEDURE — 85610 PROTHROMBIN TIME: CPT

## 2024-07-16 PROCEDURE — 2580000003 HC RX 258: Performed by: PEDIATRICS

## 2024-07-16 PROCEDURE — 6360000004 HC RX CONTRAST MEDICATION: Performed by: PEDIATRICS

## 2024-07-16 PROCEDURE — 6360000002 HC RX W HCPCS: Performed by: EMERGENCY MEDICINE

## 2024-07-16 PROCEDURE — 2580000003 HC RX 258: Performed by: INTERNAL MEDICINE

## 2024-07-16 PROCEDURE — 84484 ASSAY OF TROPONIN QUANT: CPT

## 2024-07-16 PROCEDURE — 93005 ELECTROCARDIOGRAM TRACING: CPT | Performed by: HOSPITALIST

## 2024-07-16 PROCEDURE — 6360000004 HC RX CONTRAST MEDICATION: Performed by: EMERGENCY MEDICINE

## 2024-07-16 PROCEDURE — 2580000003 HC RX 258: Performed by: HOSPITALIST

## 2024-07-16 PROCEDURE — 85379 FIBRIN DEGRADATION QUANT: CPT

## 2024-07-16 RX ORDER — RANOLAZINE 500 MG/1
500 TABLET, EXTENDED RELEASE ORAL 2 TIMES DAILY
Status: DISCONTINUED | OUTPATIENT
Start: 2024-07-16 | End: 2024-07-22 | Stop reason: HOSPADM

## 2024-07-16 RX ORDER — GLUCAGON 1 MG/ML
1 KIT INJECTION PRN
Status: DISCONTINUED | OUTPATIENT
Start: 2024-07-16 | End: 2024-07-22 | Stop reason: HOSPADM

## 2024-07-16 RX ORDER — MAGNESIUM HYDROXIDE/ALUMINUM HYDROXICE/SIMETHICONE 120; 1200; 1200 MG/30ML; MG/30ML; MG/30ML
30 SUSPENSION ORAL EVERY 6 HOURS PRN
Status: DISCONTINUED | OUTPATIENT
Start: 2024-07-16 | End: 2024-07-22 | Stop reason: HOSPADM

## 2024-07-16 RX ORDER — DIPHENHYDRAMINE HYDROCHLORIDE 50 MG/ML
50 INJECTION INTRAMUSCULAR; INTRAVENOUS ONCE
Status: DISCONTINUED | OUTPATIENT
Start: 2024-07-16 | End: 2024-07-16

## 2024-07-16 RX ORDER — SODIUM CHLORIDE 0.9 % (FLUSH) 0.9 %
5-40 SYRINGE (ML) INJECTION EVERY 12 HOURS SCHEDULED
Status: DISCONTINUED | OUTPATIENT
Start: 2024-07-16 | End: 2024-07-22 | Stop reason: HOSPADM

## 2024-07-16 RX ORDER — FENTANYL CITRATE 50 UG/ML
40 INJECTION, SOLUTION INTRAMUSCULAR; INTRAVENOUS ONCE
Status: DISCONTINUED | OUTPATIENT
Start: 2024-07-16 | End: 2024-07-16

## 2024-07-16 RX ORDER — POLYETHYLENE GLYCOL 3350 17 G/17G
17 POWDER, FOR SOLUTION ORAL DAILY PRN
Status: DISCONTINUED | OUTPATIENT
Start: 2024-07-16 | End: 2024-07-20

## 2024-07-16 RX ORDER — HEPARIN SODIUM 5000 [USP'U]/ML
5000 INJECTION, SOLUTION INTRAVENOUS; SUBCUTANEOUS EVERY 8 HOURS SCHEDULED
Status: DISCONTINUED | OUTPATIENT
Start: 2024-07-17 | End: 2024-07-16

## 2024-07-16 RX ORDER — ISOSORBIDE MONONITRATE 30 MG/1
30 TABLET, EXTENDED RELEASE ORAL DAILY
Status: DISCONTINUED | OUTPATIENT
Start: 2024-07-16 | End: 2024-07-18

## 2024-07-16 RX ORDER — PANTOPRAZOLE SODIUM 40 MG/10ML
40 INJECTION, POWDER, LYOPHILIZED, FOR SOLUTION INTRAVENOUS DAILY
Status: DISCONTINUED | OUTPATIENT
Start: 2024-07-16 | End: 2024-07-22 | Stop reason: HOSPADM

## 2024-07-16 RX ORDER — ASPIRIN 81 MG/1
324 TABLET, CHEWABLE ORAL ONCE
Status: COMPLETED | OUTPATIENT
Start: 2024-07-16 | End: 2024-07-16

## 2024-07-16 RX ORDER — FUROSEMIDE 40 MG/1
40 TABLET ORAL 2 TIMES DAILY
Status: DISCONTINUED | OUTPATIENT
Start: 2024-07-16 | End: 2024-07-17

## 2024-07-16 RX ORDER — MORPHINE SULFATE 4 MG/ML
4 INJECTION, SOLUTION INTRAMUSCULAR; INTRAVENOUS ONCE
Status: COMPLETED | OUTPATIENT
Start: 2024-07-16 | End: 2024-07-16

## 2024-07-16 RX ORDER — SODIUM CHLORIDE 0.9 % (FLUSH) 0.9 %
10 SYRINGE (ML) INJECTION ONCE
Status: COMPLETED | OUTPATIENT
Start: 2024-07-16 | End: 2024-07-16

## 2024-07-16 RX ORDER — MORPHINE SULFATE 4 MG/ML
INJECTION, SOLUTION INTRAMUSCULAR; INTRAVENOUS
Status: COMPLETED
Start: 2024-07-16 | End: 2024-07-16

## 2024-07-16 RX ORDER — LEVETIRACETAM 250 MG/1
500 TABLET ORAL 2 TIMES DAILY
Status: DISCONTINUED | OUTPATIENT
Start: 2024-07-16 | End: 2024-07-16

## 2024-07-16 RX ORDER — LORAZEPAM 1 MG/1
TABLET ORAL
Status: COMPLETED
Start: 2024-07-16 | End: 2024-07-16

## 2024-07-16 RX ORDER — DIPHENHYDRAMINE HYDROCHLORIDE 50 MG/ML
25 INJECTION INTRAMUSCULAR; INTRAVENOUS ONCE
Status: COMPLETED | OUTPATIENT
Start: 2024-07-16 | End: 2024-07-16

## 2024-07-16 RX ORDER — ONDANSETRON 4 MG/1
4 TABLET, ORALLY DISINTEGRATING ORAL EVERY 8 HOURS PRN
Status: DISCONTINUED | OUTPATIENT
Start: 2024-07-16 | End: 2024-07-22 | Stop reason: HOSPADM

## 2024-07-16 RX ORDER — LORAZEPAM 1 MG/1
1 TABLET ORAL
Status: COMPLETED | OUTPATIENT
Start: 2024-07-16 | End: 2024-07-16

## 2024-07-16 RX ORDER — SODIUM CHLORIDE 9 MG/ML
INJECTION, SOLUTION INTRAVENOUS PRN
Status: DISCONTINUED | OUTPATIENT
Start: 2024-07-16 | End: 2024-07-16

## 2024-07-16 RX ORDER — MORPHINE SULFATE 2 MG/ML
2 INJECTION, SOLUTION INTRAMUSCULAR; INTRAVENOUS
Status: DISCONTINUED | OUTPATIENT
Start: 2024-07-16 | End: 2024-07-22 | Stop reason: HOSPADM

## 2024-07-16 RX ORDER — ASPIRIN 300 MG/1
300 SUPPOSITORY RECTAL DAILY
Status: DISCONTINUED | OUTPATIENT
Start: 2024-07-17 | End: 2024-07-22

## 2024-07-16 RX ORDER — DEXTROSE MONOHYDRATE 100 MG/ML
INJECTION, SOLUTION INTRAVENOUS CONTINUOUS PRN
Status: DISCONTINUED | OUTPATIENT
Start: 2024-07-16 | End: 2024-07-22 | Stop reason: HOSPADM

## 2024-07-16 RX ORDER — SODIUM CHLORIDE 9 MG/ML
INJECTION, SOLUTION INTRAVENOUS PRN
Status: DISCONTINUED | OUTPATIENT
Start: 2024-07-16 | End: 2024-07-22 | Stop reason: HOSPADM

## 2024-07-16 RX ORDER — ACETAMINOPHEN 500 MG
1000 TABLET ORAL ONCE
Status: COMPLETED | OUTPATIENT
Start: 2024-07-16 | End: 2024-07-16

## 2024-07-16 RX ORDER — SODIUM CHLORIDE 0.9 % (FLUSH) 0.9 %
5-40 SYRINGE (ML) INJECTION PRN
Status: DISCONTINUED | OUTPATIENT
Start: 2024-07-16 | End: 2024-07-22 | Stop reason: HOSPADM

## 2024-07-16 RX ORDER — HYDRALAZINE HYDROCHLORIDE 25 MG/1
50 TABLET, FILM COATED ORAL 3 TIMES DAILY
Status: DISCONTINUED | OUTPATIENT
Start: 2024-07-16 | End: 2024-07-22 | Stop reason: HOSPADM

## 2024-07-16 RX ORDER — NITROGLYCERIN 0.4 MG/1
0.4 TABLET SUBLINGUAL EVERY 5 MIN PRN
Status: DISCONTINUED | OUTPATIENT
Start: 2024-07-16 | End: 2024-07-16

## 2024-07-16 RX ORDER — CARVEDILOL 25 MG/1
25 TABLET ORAL 2 TIMES DAILY
Status: DISCONTINUED | OUTPATIENT
Start: 2024-07-16 | End: 2024-07-16

## 2024-07-16 RX ORDER — SODIUM CHLORIDE 0.9 % (FLUSH) 0.9 %
5-40 SYRINGE (ML) INJECTION PRN
Status: DISCONTINUED | OUTPATIENT
Start: 2024-07-16 | End: 2024-07-16

## 2024-07-16 RX ORDER — ONDANSETRON 2 MG/ML
4 INJECTION INTRAMUSCULAR; INTRAVENOUS EVERY 6 HOURS PRN
Status: DISCONTINUED | OUTPATIENT
Start: 2024-07-16 | End: 2024-07-22 | Stop reason: HOSPADM

## 2024-07-16 RX ORDER — ASPIRIN 81 MG/1
81 TABLET, CHEWABLE ORAL DAILY
Status: DISCONTINUED | OUTPATIENT
Start: 2024-07-17 | End: 2024-07-22

## 2024-07-16 RX ORDER — HYDROMORPHONE HYDROCHLORIDE 1 MG/ML
0.5 INJECTION, SOLUTION INTRAMUSCULAR; INTRAVENOUS; SUBCUTANEOUS
Status: DISCONTINUED | OUTPATIENT
Start: 2024-07-16 | End: 2024-07-16 | Stop reason: HOSPADM

## 2024-07-16 RX ORDER — ROSUVASTATIN CALCIUM 40 MG/1
40 TABLET, COATED ORAL EVERY EVENING
Status: DISCONTINUED | OUTPATIENT
Start: 2024-07-16 | End: 2024-07-22 | Stop reason: HOSPADM

## 2024-07-16 RX ADMIN — FUROSEMIDE 40 MG: 40 TABLET ORAL at 18:22

## 2024-07-16 RX ADMIN — RANOLAZINE 500 MG: 500 TABLET, EXTENDED RELEASE ORAL at 12:48

## 2024-07-16 RX ADMIN — HYDRALAZINE HYDROCHLORIDE 50 MG: 25 TABLET ORAL at 19:45

## 2024-07-16 RX ADMIN — MORPHINE SULFATE 4 MG: 4 INJECTION, SOLUTION INTRAMUSCULAR; INTRAVENOUS at 16:18

## 2024-07-16 RX ADMIN — NITROGLYCERIN 0.4 MG: 0.4 TABLET, ORALLY DISINTEGRATING SUBLINGUAL at 21:10

## 2024-07-16 RX ADMIN — SODIUM CHLORIDE, PRESERVATIVE FREE 10 ML: 5 INJECTION INTRAVENOUS at 05:03

## 2024-07-16 RX ADMIN — SACUBITRIL AND VALSARTAN 1 TABLET: 49; 51 TABLET, FILM COATED ORAL at 19:45

## 2024-07-16 RX ADMIN — Medication 22 MG: at 05:00

## 2024-07-16 RX ADMIN — ROSUVASTATIN CALCIUM 40 MG: 40 TABLET, COATED ORAL at 18:22

## 2024-07-16 RX ADMIN — SODIUM CHLORIDE, PRESERVATIVE FREE 10 ML: 5 INJECTION INTRAVENOUS at 21:37

## 2024-07-16 RX ADMIN — SACUBITRIL AND VALSARTAN 1 TABLET: 49; 51 TABLET, FILM COATED ORAL at 12:48

## 2024-07-16 RX ADMIN — MORPHINE SULFATE 4 MG: 4 INJECTION, SOLUTION INTRAMUSCULAR; INTRAVENOUS at 21:27

## 2024-07-16 RX ADMIN — PERFLUTREN 1.5 ML: 6.52 INJECTION, SUSPENSION INTRAVENOUS at 08:32

## 2024-07-16 RX ADMIN — WATER 60 MG: 1 INJECTION INTRAMUSCULAR; INTRAVENOUS; SUBCUTANEOUS at 23:06

## 2024-07-16 RX ADMIN — IOPAMIDOL 75 ML: 755 INJECTION, SOLUTION INTRAVENOUS at 04:08

## 2024-07-16 RX ADMIN — WATER 125 MG: 1 INJECTION INTRAMUSCULAR; INTRAVENOUS; SUBCUTANEOUS at 04:12

## 2024-07-16 RX ADMIN — MORPHINE SULFATE 2 MG: 2 INJECTION, SOLUTION INTRAMUSCULAR; INTRAVENOUS at 23:21

## 2024-07-16 RX ADMIN — LORAZEPAM 1 MG: 1 TABLET ORAL at 16:22

## 2024-07-16 RX ADMIN — NITROGLYCERIN 0.5 INCH: 20 OINTMENT TOPICAL at 21:34

## 2024-07-16 RX ADMIN — FAMOTIDINE 20 MG: 10 INJECTION, SOLUTION INTRAVENOUS at 23:08

## 2024-07-16 RX ADMIN — SODIUM CHLORIDE, PRESERVATIVE FREE 10 ML: 5 INJECTION INTRAVENOUS at 05:02

## 2024-07-16 RX ADMIN — HYDRALAZINE HYDROCHLORIDE 50 MG: 25 TABLET ORAL at 12:49

## 2024-07-16 RX ADMIN — NITROGLYCERIN 0.5 INCH: 20 OINTMENT TOPICAL at 06:21

## 2024-07-16 RX ADMIN — NITROGLYCERIN 0.4 MG: 0.4 TABLET, ORALLY DISINTEGRATING SUBLINGUAL at 21:01

## 2024-07-16 RX ADMIN — DIPHENHYDRAMINE HYDROCHLORIDE 25 MG: 50 INJECTION INTRAMUSCULAR; INTRAVENOUS at 23:10

## 2024-07-16 RX ADMIN — ACETAMINOPHEN 1000 MG: 500 TABLET ORAL at 05:53

## 2024-07-16 RX ADMIN — DIPHENHYDRAMINE HYDROCHLORIDE 25 MG: 50 INJECTION INTRAMUSCULAR; INTRAVENOUS at 04:12

## 2024-07-16 RX ADMIN — ALUMINUM HYDROXIDE, MAGNESIUM HYDROXIDE, AND SIMETHICONE 30 ML: 200; 200; 20 SUSPENSION ORAL at 20:41

## 2024-07-16 RX ADMIN — IOPAMIDOL 75 ML: 755 INJECTION, SOLUTION INTRAVENOUS at 23:40

## 2024-07-16 RX ADMIN — ONDANSETRON 4 MG: 2 INJECTION INTRAMUSCULAR; INTRAVENOUS at 09:13

## 2024-07-16 RX ADMIN — SODIUM CHLORIDE 0.5 MG: 9 INJECTION INTRAMUSCULAR; INTRAVENOUS; SUBCUTANEOUS at 06:38

## 2024-07-16 RX ADMIN — PANTOPRAZOLE SODIUM 40 MG: 40 INJECTION, POWDER, FOR SOLUTION INTRAVENOUS at 09:12

## 2024-07-16 RX ADMIN — ASPIRIN 81 MG 324 MG: 81 TABLET ORAL at 05:52

## 2024-07-16 RX ADMIN — FAMOTIDINE 20 MG: 10 INJECTION, SOLUTION INTRAVENOUS at 04:13

## 2024-07-16 RX ADMIN — SODIUM CHLORIDE, PRESERVATIVE FREE 10 ML: 5 INJECTION INTRAVENOUS at 09:12

## 2024-07-16 RX ADMIN — RANOLAZINE 500 MG: 500 TABLET, EXTENDED RELEASE ORAL at 19:45

## 2024-07-16 RX ADMIN — SODIUM CHLORIDE 0.5 MG: 9 INJECTION INTRAMUSCULAR; INTRAVENOUS; SUBCUTANEOUS at 23:11

## 2024-07-16 ASSESSMENT — PAIN DESCRIPTION - LOCATION
LOCATION: HEAD
LOCATION: CHEST
LOCATION_2: HEAD
LOCATION: CHEST
LOCATION: CHEST

## 2024-07-16 ASSESSMENT — PAIN DESCRIPTION - ORIENTATION
ORIENTATION: LEFT
ORIENTATION: RIGHT
ORIENTATION: MID;LEFT
ORIENTATION: MID
ORIENTATION: LEFT
ORIENTATION: LEFT

## 2024-07-16 ASSESSMENT — PAIN SCALES - GENERAL
PAINLEVEL_OUTOF10: 7
PAINLEVEL_OUTOF10: 6
PAINLEVEL_OUTOF10: 1
PAINLEVEL_OUTOF10: 3
PAINLEVEL_OUTOF10: 8
PAINLEVEL_OUTOF10: 6
PAINLEVEL_OUTOF10: 8
PAINLEVEL_OUTOF10: 4
PAINLEVEL_OUTOF10: 4
PAINLEVEL_OUTOF10: 7
PAINLEVEL_OUTOF10: 7
PAINLEVEL_OUTOF10: 5
PAINLEVEL_OUTOF10: 8

## 2024-07-16 ASSESSMENT — PAIN - FUNCTIONAL ASSESSMENT
PAIN_FUNCTIONAL_ASSESSMENT: ACTIVITIES ARE NOT PREVENTED
PAIN_FUNCTIONAL_ASSESSMENT: ACTIVITIES ARE NOT PREVENTED
PAIN_FUNCTIONAL_ASSESSMENT_SITE2: ACTIVITIES ARE NOT PREVENTED
PAIN_FUNCTIONAL_ASSESSMENT_SITE2: ACTIVITIES ARE NOT PREVENTED

## 2024-07-16 ASSESSMENT — PAIN DESCRIPTION - DESCRIPTORS
DESCRIPTORS: STABBING
DESCRIPTORS: SHARP;STABBING
DESCRIPTORS: STABBING;SHARP
DESCRIPTORS: ACHING
DESCRIPTORS: SHARP;SQUEEZING

## 2024-07-16 ASSESSMENT — PAIN DESCRIPTION - PAIN TYPE
TYPE: CHRONIC PAIN
TYPE: CHRONIC PAIN

## 2024-07-16 ASSESSMENT — PAIN DESCRIPTION - ONSET: ONSET: ON-GOING

## 2024-07-16 ASSESSMENT — PAIN DESCRIPTION - INTENSITY
RATING_2: 6
RATING_2: 4
RATING_2: 3
RATING_2: 3

## 2024-07-16 ASSESSMENT — PAIN DESCRIPTION - FREQUENCY: FREQUENCY: CONTINUOUS

## 2024-07-16 NOTE — PROGRESS NOTES
Dr Pryor called per request of wife to ask about changing dialysis.  Dr Allen called back & changed fluids for tonight.  MD instructed for wife to speak with Dr Pryor tomorrow with her concerns  Both pt & wife juan ramon/uRasheed Lara served Dr López regarding pt's use of insulin pump.  MD stated for pt to continue using own insulin pump

## 2024-07-16 NOTE — CARE COORDINATION
Case Management Assessment  Initial Evaluation    Date/Time of Evaluation: 7/16/2024 3:24 PM  Assessment Completed by: Eunice Gordon RN    If patient is discharged prior to next notation, then this note serves as note for discharge by case management.    Patient Name: Te Garay                   YOB: 1965  Diagnosis: Acute ischemic stroke (HCC) [I63.9]  Acute CVA (cerebrovascular accident) (HCC) [I63.9]  Chest pain, unspecified type [R07.9]  Cerebrovascular accident (CVA) due to embolism of anterior cerebral artery, unspecified blood vessel laterality (HCC) [I63.429]                   Date / Time: 7/16/2024  3:37 AM    Patient Admission Status: Inpatient   Readmission Risk (Low < 19, Mod (19-27), High > 27): Readmission Risk Score: 38.4    Current PCP: Reid Lei MD  PCP verified by CM? (P) Yes    Chart Reviewed: Yes      History Provided by: (P) Patient, Significant Other, Medical Record  Patient Orientation: (P) Alert and Oriented, Person, Place, Situation    Patient Cognition: (P) Alert    Hospitalization in the last 30 days (Readmission):  Yes    If yes, Readmission Assessment in  Navigator will be completed.    Advance Directives:      Code Status: Full Code   Patient's Primary Decision Maker is: (P) Legal Next of Kin      Discharge Planning:    Patient lives with: (P) Spouse/Significant Other, Children Type of Home: (P) House  Primary Care Giver: (P) Self  Patient Support Systems include: (P) Spouse/Significant Other, Children   Current Financial resources: (P) Medicare  Current community resources: (P) ECF/Home Care (active w/[facility])  Current services prior to admission: (P) Home Care, Durable Medical Equipment            Current DME: (P) Cane, Glucometer, Walker, Wheelchair (wife unsure where walker is, will look for it)            Type of Home Care services:  (P) PT, OT, Nursing Services    ADLS  Prior functional level: (P) Independent in ADLs/IADLs  Current functional level:

## 2024-07-16 NOTE — CONSULTS
[facility]   Nephrology progress Note  [phone]  [phone]   [facility] [employer]    Reason for Consult:  ESRD on PD     HISTORY OF PRESENT ILLNESS:      The patient is a 59 y.o. male with significant past medical history of type 1 diabetes diagnosed when he was 11-year-old, ESRD on peritoneal dialysis for the last 4 to 5 years, coronary artery disease s/p CABG, carotid artery stenosis s/p stent placement in the right ICA, left foot infection hypertension, hyperlipidemia, nephrolithiasis, presented to the hospital this time with having woken up with sudden onset of headache chest pain, in the ED he was evaluated and showed dysarthria and left lower lobe extremity weakness.  He was seen by Dr. Hannon of the stroke team., was given tenelctapase        Past Medical History:        Diagnosis Date    Angina at rest ([facility])     Cardiomyopathy ([facility])     Carotid artery stenosis 10/25/2016    SUZANNA stented with 8 x 30 mm non-tapered Xact stent    Charcot foot due to diabetes mellitus ([facility]) 07/01/2024    CHF (congestive heart failure) ([facility]) 03/03/2015    EF 30%     CKD (chronic kidney disease) stage 2, GFR 60-89 ml/min     Coronary artery disease     sp CABG UC    Coronary artery disease involving native heart with angina pectoris ([facility]) 05/26/2010    Formatting of this note might be different from the original.   s/p CABG      Last Assessment & Plan:    Formatting of this note might be different from the original.   s/p PTCA and stent during 04/24 hosptialization.        Cardiology (Dr. Celestin)  has told him that he will get cardiac clearance, but he needs to have angioplasty at some point because there is a small lesion that needs to be addr    De Quervain thyroiditis 06/08/2023    Last Assessment & Plan:    Formatting of this note might be different from the original.   He has left texting thumb x 3 months.  This is not carpal tunnel.  Evidently, he has texted more over the last 1.5 years than before

## 2024-07-16 NOTE — CARE COORDINATION
07/16/24 1527   Readmission Assessment   Number of Days since last admission? 8-30 days   Previous Disposition Home with Home Health   Who is being Interviewed Patient   What was the patient's/caregiver's perception as to why they think they needed to return back to the hospital? Other (Comment)  (new onset stroke symptoms)   Did you visit your Primary Care Physician after you left the hospital, before you returned this time? No   Why weren't you able to visit your PCP? Did not have an appointment   Did you see a specialist, such as Cardiac, Pulmonary, Orthopedic Physician, etc. after you left the hospital? Yes  (cardiology)   Who advised the patient to return to the hospital? Other (Comment)  (wife & son)   Does the patient report anything that got in the way of taking their medications? No   In our efforts to provide the best possible care to you and others like you, can you think of anything that we could have done to help you after you left the hospital the first time, so that you might not have needed to return so soon? Identify patient's health literacy needs;Arrange for more help when leaving the hospital;Discharge instructions that are concise, clear, and non contradictory;Teach back instructions regarding management of illness

## 2024-07-16 NOTE — ED PROVIDER NOTES
EMERGENCY MEDICINE PROVIDER NOTE    Patient Identification  Pt Name: Te Garay  MRN: [id number]  Birthdate 1965  Date of evaluation: 7/16/2024  Provider: Jw Larios MD  PCP: Reid Lei MD    Chief Complaint  Headache (PT arrived [facility] from home pt receives dialysis at home when he suddenly began to act abnormal according to the wife. Pt states he has left foot pain and a severe headache and feels dehydrated. )      HPI  (History provided by {Persons; family members:[id number]})  This is a 59 y.o. male who was brought in by {EMS:[id number]} for ***.     I have reviewed the following nursing documentation:  Allergies: Iodides, Shellfish-derived products, Lipitor [atorvastatin], and Atorvastatin calcium    Past medical history:   Past Medical History:   Diagnosis Date    Angina at rest ([facility])     Cardiomyopathy ([facility])     Carotid artery stenosis 10/25/2016    SUZANNA stented with 8 x 30 mm non-tapered Xact stent    Charcot foot due to diabetes mellitus ([facility]) 07/01/2024    CHF (congestive heart failure) ([facility]) 03/03/2015    EF 30%     CKD (chronic kidney disease) stage 2, GFR 60-89 ml/min     Coronary artery disease     sp CABG UC    Coronary artery disease involving native heart with angina pectoris ([facility]) 05/26/2010    Formatting of this note might be different from the original.   s/p CABG      Last Assessment & Plan:    Formatting of this note might be different from the original.   s/p PTCA and stent during 04/24 hosptialization.        Cardiology (Dr. Celestin)  has told him that he will get cardiac clearance, but he needs to have angioplasty at some point because there is a small lesion that needs to be addr    De Quervain thyroiditis 06/08/2023    Last Assessment & Plan:    Formatting of this note might be different from the original.   He has left texting thumb x 3 months.  This is not carpal tunnel.  Evidently, he has texted more over the last 1.5 years than before because his wife would like a more  failure) ([facility]) (03/03/2015), CKD (chronic kidney disease) stage 2, GFR 60-89 ml/min, Coronary artery disease, Coronary artery disease involving native heart with angina pectoris ([facility]) (05/26/2010), De Quervain thyroiditis (06/08/2023), Diabetic peripheral neuropathy ([facility]), Diabetic polyneuropathy associated with type 1 diabetes mellitus ([facility]) (07/01/2024), Diastolic HF (heart failure) ([facility]), Essential hypertension (01/12/2019), History of COVID-19 (04/16/2024), Hyperlipidemia (05/26/2010), Hyperlipidemia with target LDL less than 70, Hyperparathyroidism due to renal insufficiency ([facility]) (04/25/2024), Hypertension goal BP (blood pressure) < 130/80, Peritonitis associated with peritoneal dialysis ([facility]) (12/07/2021), PVD (peripheral vascular disease) ([facility]), S/P CABG (coronary artery bypass graft) (05/24/2018), Seizure disorder ([facility]), Seizures ([facility]), Stenosis of carotid artery, Toe osteomyelitis, left ([facility]) (07/01/2024), and Type 1 diabetes mellitus with chronic kidney disease ([facility]).    [facility] and ED Course  ***([facility])  Patient presented with left-sided deficits and slurred speech. Patient's last known well was ~midnight per wife. He woke around 3:30am with chest pain and slurred speech. By the time he arrived, he also had mild left facial weakness, including deviation of tongue to left, along with near complete paralysis of the left leg. This was consistent on multiple exams, including that performed via telehealth by the  stroke physician on-call, Dr. Hannon. CTA showed no LVO, so thrombectomy was not indicated. CT head was negative for hemorrhage, so patient met criteria for tPA. After consenting patient and family, patient agreed to proceed with TNK.    Patient's chest pain is also severe, but chronic and recurrent, seen on many previous ED and hospital visits. His EKG was unchanged. Although troponin is elevated, it is lower than recent historical troponin values for this patient.     I consulted the on-call ICU  Home

## 2024-07-16 NOTE — ED NOTES
Reviewed   CBC WITH AUTO DIFFERENTIAL - Abnormal; Notable for the following components:       Result Value    RBC 3.48 (*)     Hemoglobin 10.4 (*)     Hematocrit 31.9 (*)     All other components within normal limits   COMPREHENSIVE METABOLIC PANEL W/ REFLEX TO MG FOR LOW K - Abnormal; Notable for the following components:    Glucose 165 (*)     BUN 44 (*)     Creatinine 7.0 (*)     Est, Glom Filt Rate 8 (*)     AST 14 (*)     All other components within normal limits    Narrative:     CALL  Smith  [facility] tel. [phone],  Previous panic on this admission - call not needed per SOP, 07/16/2024 04:28,  by COLCL   TROPONIN - Abnormal; Notable for the following components:    Troponin, High Sensitivity 114 (*)     All other components within normal limits    Narrative:     CALL  Smith  [facility] tel. [phone],  Previous panic on this admission - call not needed per SOP, 07/16/2024 04:28,  by COLCL   D-DIMER, QUANTITATIVE - Abnormal; Notable for the following components:    D-Dimer, Quant 0.63 (*)     All other components within normal limits   TROPONIN - Abnormal; Notable for the following components:    Troponin, High Sensitivity 113 (*)     All other components within normal limits   POCT GLUCOSE - Abnormal; Notable for the following components:    POC Glucose 128 (*)     All other components within normal limits     Critical values: no     Abnormal Assessment Findings: stroke alert     Background  History:   Past Medical History:   Diagnosis Date    Angina at rest ([facility])     Cardiomyopathy ([facility])     Carotid artery stenosis 10/25/2016    SUZANNA stented with 8 x 30 mm non-tapered Xact stent    CHF (congestive heart failure) ([facility]) 03/03/2015    EF 30%     CKD (chronic kidney disease) stage 2, GFR 60-89 ml/min     Coronary artery disease     sp CABG UC    Diabetic peripheral neuropathy ([facility])     Diastolic HF (heart failure) ([facility])     Hyperlipidemia with target LDL less than 70     Hypertension goal BP (blood pressure) < 130/80      PVD (peripheral vascular disease) (HCC)     Seizures (HCC)     in childhood    Type 1 diabetes mellitus with chronic kidney disease (HCC)     c-peptide <0.1 in 2015       Assessment    Vitals/MEWS:        Vitals:    07/16/24 0450 07/16/24 0500 07/16/24 0515 07/16/24 0530   BP:  (!) 153/67 (!) 153/77 (!) 144/68   Pulse:  (!) 101 (!) 102 (!) 101   Resp:  20 15 22   Temp:  98.2 °F (36.8 °C) 98.3 °F (36.8 °C)    SpO2:  100% 96% 100%   Weight: 88 kg (194 lb)        FiO2 (%):   O2 Flow Rate:      Cardiac Rhythm:    Pain Assessment:  [x] Verbal [] Rasmussen Coon Scale  Pain Scale:    Last documented pain score (0-10 scale)    Last documented pain medication administered:   Mental Status: oriented, alert, coherent, logical, thought processes intact, and able to concentrate and follow conversation  Orientation Level:    NIH Score: Total: 5  C-SSRS: Risk of Suicide: No Risk  Bedside swallow:    [place] Coma Scale (GCS): [place] Coma Scale  Eye Opening: Spontaneous  Best Verbal Response: Oriented  Best Motor Response: Obeys commands  [place] Coma Scale Score: 15  Active LDA's:   Peripheral IV 07/16/24 Right Forearm (Active)                 PO Status: see admit   Pertinent or High Risk Medications/Drips: yes   If Yes, please provide details: TNK  Pending Blood Product Administration: no       You may also review the ED PT Care Timeline found under the Summary Nursing Index tab.    Recommendation    Pending orders See admit orders   Plan for Discharge (if known):   Additional Comments: TNK given at 0500    If any further questions, please call Sending RN at [phone]    Electronically signed by: Electronically signed by Cassia Yip RN on 7/16/2024 at 5:36 AM

## 2024-07-16 NOTE — H&P
Hospital Medicine History & Physical        Name:  Te Garay  /Age/Sex: 1965  (59 y.o. male)  MRN & CSN:  [id number] & [id number]     PCP: Reid Lei MD    Date of Admission: 2024    Date of Service: Patient seen/examined on 2024    Patient Status:  Inpatient - Patient will most likely require more than 48 hours of treatment and requires intensive medical treatment/monitoring     Chief Complaint:    Chief Complaint   Patient presents with    Headache     PT arrived [facility] from home pt receives dialysis at home when he suddenly began to act abnormal according to the wife. Pt states he has left foot pain and a severe headache and feels dehydrated.          History Of Present Illness:     59 y.o. male with PMHx of ESRD, seizures, HLD, HTN, DM I who presented to [facility] with complaints of headache.    Patient states he had a headache, which initially brought him to the hospital.  Patient is very fatigued.  Wife at bedside, who was able to provide more history, stated patient was started to have slurred speech and had inability to stand or move his left leg.  Patient had a TIA in , but has not had any issues with speech or mobility since then.  Of note, patient does have a diabetic wound on the outside of his left foot which makes it difficult for him to walk, but his current condition is drastically different from his norm.    Patient did receive TNK at 0500 in the ED for possible CVA.    Per the wife, patient did not complain of any chest pain, nausea, vomiting, GI/ symptoms, fever, or chills.  Patient, and his fatigued state, did state he was a little short of breath, but denied chest pain.    Denies tobacco or illicit drug use.  Endorses rare alcohol use.    Past Medical History:          Diagnosis Date    Angina at rest (HCC)     Cardiomyopathy (HCC)     Carotid artery stenosis 10/25/2016    SUZANNA stented with 8 x 30 mm non-tapered Xact stent    Charcot foot due to

## 2024-07-16 NOTE — PROGRESS NOTES
Facility/Department: [facility] ICU  Speech Language Pathology  DYSPHAGIA BEDSIDE SWALLOW EVALUATION     Patient: Te Garay   : 1965   MRN: [id number]      Evaluation Date: 2024   Admitting Diagnosis: Acute ischemic stroke (HCC) [I63.9]  Acute CVA (cerebrovascular accident) (HCC) [I63.9]  Chest pain, unspecified type [R07.9]  Cerebrovascular accident (CVA) due to embolism of anterior cerebral artery, unspecified blood vessel laterality (HCC) [I63.429]  Pain: Denies                                                       H&P: \"59 y.o. male with PMHx of ESRD, seizures, HLD, HTN, DM I who presented to [facility] with complaints of headache.  Patient states he had a headache, which initially brought him to the hospital.  Patient is very fatigued.  Wife at bedside, who was able to provide more history, stated patient was started to have slurred speech and had inability to stand or move his left leg.  Patient had a TIA in , but has not had any issues with speech or mobility since then.  Of note, patient does have a diabetic wound on the outside of his left foot which makes it difficult for him to walk, but his current condition is drastically different from his norm.  Patient did receive TNK at 0500 in the ED for possible CVA.  Per the wife, patient did not complain of any chest pain, nausea, vomiting, GI/ symptoms, fever, or chills.  Patient, and his fatigued state, did state he was a little short of breath, but denied chest pain.  Denies tobacco or illicit drug use.  Endorses rare alcohol use.\"    Imaging:  Chest X-ray:   IMPRESSION:  Silhouetting of the left heart border may be due to atelectasis or pneumonia.    Head CT:   IMPRESSION:  1. No significant carotid stenosis.  2. Patent endovascular stent in the proximal segment of right internal  carotid artery.  3. Calcified plaques at the origin and proximal segment of the left internal  carotid artery with 50% stenosis.  4. Calcified plaques in the  lower portion of the cervical segment of the  right vertebral artery with mild stenosis.  5. No significant intracranial arterial stenosis or occlusion.  6. No evidence of aneurysm.  7. No acute intracranial abnormality on the precontrast CT scan of head on  07/16/2024 at 0404 hours.       History/Prior Level of Function:   Living Status: lives with their family  Prior Dysphagia History: Pt previously evaluated in 7/2023 with recommendations Regular texture diet with thin liquids   Reason for referral: SLP evaluation orders received due to CVA protocol     Dysphagia Impressions/Diagnosis: Oropharyngeal Dysphagia   Pt was positioned upright in  chair on RA. Oral mechanism exam; grossly intact for ROM/coordination. Various consistency trials were provided to assess swallow function. Pt demonstrated prolonged mastication with effective oral clearing. Swallow timing appeared grossly WNL. Intermittent use of multiple swallows was noted to clear. No immediate overt clinical s/s of aspiration/penetration were assessed. Pt consumed 3 oz of water continuously with no overt s/s of aspiration. At this time recommend advance to Regular texture diet with thin liquids, meds as tolerated with follow-up to ensure tolerance. Pt may benefit from full speech language evaluation pending results of MRI as he does report some delayed word finding.     Recommended Diet and Intervention 7/16/2024:  Diet Solids Recommendation:  Regular texture diet  Liquid Consistency Recommendation:  Thin liquids  Recommended form of Meds: Meds whole with water          Compensatory Swallowing Strategies:  Upright as possible with all PO intake , Small bites/sips , Eat/feed slowly    SHORT TERM DYSPHAGIA GOALS/PLAN OF CARE: Speech therapy for dysphagia tx 1-2 times to ensure diet tolerance. and Recommend full speech language evaluation as able to tolerate   Pt will functionally tolerate recommended diet with no overt clinical s/s of aspiration

## 2024-07-16 NOTE — PROGRESS NOTES
Physical and Occupational Therapy  Te Garay  1965    PT/OT orders received, chart reviewed. Per RN, patient received TNK and is currently on bedrest. Will follow up when patient is medically appropriate.     Adilia Pinto PT, DPT [id number]  Gamal Barkley, [facility] OTR/L [id number]

## 2024-07-16 NOTE — ED NOTES
How does patient ambulate?   []Low Fall Risk (ambulates by themselves without support)  []Stand by assist   []Contact Guard   []Front wheel walker  []Wheelchair   []Steady  [x]Bed bound  []History of Lower Extremity Amputation  []Unknown, did not assess in the emergency department   New onset left leg weakness have not been able to ambulate in the ER   How does patient take pills?  []Whole with Water  []Crushed in applesauce  []Crushed in pudding  []Other  [x]Unknown no oral medications were given in the ED  Is patient alert?   [x]Alert  []Drowsy but responds to voice  []Doesn't respond to voice but responds to painful stimuli  []Unresponsive  Is patient oriented?   [x]To person  [x]To place  [x]To time  [x]To situation  []Confused  []Agitated  [x]Follows commands  If patient is disoriented or from a Skill Nursing Facility has family been notified of admission?   []Yes   []No  Patient belongings?   []Cell phone  []Wallet   []Dentures  [x]Clothing  Any specific patient or family belongings/needs/dynamics?   Wife at bed side   Diabetic ulcer to left foot   Miscellaneous comments/pending orders?  See admit orders      If there are any additional questions please reach out to the Emergency Department.

## 2024-07-16 NOTE — PLAN OF CARE
Problem: Discharge Planning  Goal: Discharge to home or other facility with appropriate resources  Outcome: Progressing  Flowsheets  Taken 7/16/2024 1014  Discharge to home or other facility with appropriate resources:   Identify barriers to discharge with patient and caregiver   Arrange for needed discharge resources and transportation as appropriate   Refer to discharge planning if patient needs post-hospital services based on physician order or complex needs related to functional status, cognitive ability or social support system   Identify discharge learning needs (meds, wound care, etc)  Taken 7/16/2024 0800  Discharge to home or other facility with appropriate resources:   Identify barriers to discharge with patient and caregiver   Arrange for needed discharge resources and transportation as appropriate   Identify discharge learning needs (meds, wound care, etc)   Refer to discharge planning if patient needs post-hospital services based on physician order or complex needs related to functional status, cognitive ability or social support system     Problem: Pain  Goal: Verbalizes/displays adequate comfort level or baseline comfort level  Outcome: Progressing  Flowsheets (Taken 7/16/2024 1848)  Verbalizes/displays adequate comfort level or baseline comfort level:   Encourage patient to monitor pain and request assistance   Assess pain using appropriate pain scale   Administer analgesics based on type and severity of pain and evaluate response   Implement non-pharmacological measures as appropriate and evaluate response   Consider cultural and social influences on pain and pain management   Notify Licensed Independent Practitioner if interventions unsuccessful or patient reports new pain     Problem: Chronic Conditions and Co-morbidities  Goal: Patient's chronic conditions and co-morbidity symptoms are monitored and maintained or improved  Outcome: Progressing  Flowsheets (Taken 7/16/2024 0800)  Care Plan -  location of movements and any associated apnea   If seizure occurs, turn head to side and suction secretions as needed   Administer anticonvulsants as ordered   Support airway/breathing, administer oxygen as needed   Diagnostic studies as ordered  Goal: Remains free of injury related to seizures activity  Outcome: Progressing  Flowsheets (Taken 7/16/2024 0800)  Remains free of injury related to seizure activity:   Maintain airway, patient safety  and administer oxygen as ordered   Monitor patient for seizure activity, document and report duration and description of seizure to Licensed Independent Practitioner   If seizure occurs, turn patient to side and suction secretions as needed   Reorient patient post seizure   Seizure pads on all 4 side rails   Instruct patient/family to notify RN of any seizure activity   Instruct patient/family to call for assistance with activity based on assessment  Goal: Achieves maximal functionality and self care  Outcome: Progressing  Flowsheets (Taken 7/16/2024 0800)  Achieves maximal functionality and self care:   Monitor swallowing and airway patency with patient fatigue and changes in neurological status   Encourage and assist patient to increase activity and self care with guidance from physical therapy/occupational therapy     Problem: Cardiovascular - Adult  Goal: Maintains optimal cardiac output and hemodynamic stability  Outcome: Progressing  Flowsheets (Taken 7/16/2024 0800)  Maintains optimal cardiac output and hemodynamic stability:   Monitor blood pressure and heart rate   Monitor urine output and notify Licensed Independent Practitioner for values outside of normal range   Assess for signs of decreased cardiac output   Administer fluid and/or volume expanders as ordered   Administer vasoactive medications as ordered  Goal: Absence of cardiac dysrhythmias or at baseline  Outcome: Progressing  Flowsheets (Taken 7/16/2024 0800)  Absence of cardiac dysrhythmias or at

## 2024-07-16 NOTE — PROGRESS NOTES
4 Eyes Skin Assessment     NAME:  Te Garay  YOB: 1965  MEDICAL RECORD NUMBER:  [id number]    The patient is being assessed for  Admission    I agree that at least one RN has performed a thorough Head to Toe Skin Assessment on the patient. ALL assessment sites listed below have been assessed.      Areas assessed by both nurses:    Head, Face, Ears, Shoulders, Back, Chest, Arms, Elbows, Hands, Sacrum. Buttock, Coccyx, Ischium, Legs. Feet and Heels, Under Medical Devices , and Other          Does the Patient have a Wound? Yes wound(s) were present on assessment. LDA wound assessment was Initiated and completed by RN       Paul Prevention initiated by RN: Yes  Wound Care Orders initiated by RN: Yes    Pressure Injury (Stage 3,4, Unstageable, DTI, NWPT, and Complex wounds) if present, place Wound referral order by RN under : Yes    New Ostomies, if present place, Ostomy referral order under : No     Nurse 1 eSignature: Electronically signed by Lucia Allan RN on 7/16/24 at 7:59 AM EDT    **SHARE this note so that the co-signing nurse can place an eSignature**    Nurse 2 eSignature: Electronically signed by JORGE ROBERTS RN on 7/16/24 at 6:48 PM EDT

## 2024-07-16 NOTE — PROGRESS NOTES
Patient to room 3911. Report given at bedside. Vitals and weight obtained. A/O x 4. NIHSS of 5. Patient complaining of chest pain- 7/10 states that it is chronic but is worse right now. EKG's obtained in the ED and nitro applied. Wound on left lateral foot, wound care consulted. Repositioned, call light within reach.

## 2024-07-16 NOTE — ED PROVIDER NOTES
I noticed that the patient was yelling and screaming in the ER.  Patient is complaining of chest pain.  Patient recently had 2 EKGs in the ER and they do not show any acute findings.  Patient is neurologically intact.  When I walked in the patient wanted to get out of bed and feels as though his chest pain will get better if he gets out of bed.  I did notice that his blood pressure was increasing and at that time was 163/101.  Patient did receive TNK recently.  I ordered 0.5 mg of Ativan and also let the hospitalist know since this may affect the neurologic exams in the future after TNK.  Chest x-ray was ordered and is pending at this time.         Reagan Lyman,   07/16/24 0627

## 2024-07-16 NOTE — PROGRESS NOTES
[facility] - Inpatient Rehabilitation Department   Phone: [phone]    Occupational Therapy    [x] Initial Evaluation            [] Daily Treatment Note         [] Discharge Summary      Patient: Te Garay   : 1965   MRN: [id number]   Date of Service:  2024    Admitting Diagnosis:  CVA (cerebral vascular accident) ([facility])  Current Admission Summary: 59 y.o. male with significant past medical history of type 1 diabetes diagnosed when he was 11-year-old, ESRD on peritoneal dialysis for the last 4 to 5 years, coronary artery disease s/p CABG, carotid artery stenosis s/p stent placement in the right ICA, left foot infection hypertension, hyperlipidemia, nephrolithiasis, presented to the hospital this time with having woken up with sudden onset of headache chest pain, in the ED he was evaluated and showed dysarthria and left lower lobe extremity weakness.  He was seen by Dr. Hannon of the stroke team., was given tenelctapase.   Past Medical History:  has a past medical history of Angina at rest ([facility]), Cardiomyopathy ([facility]), Carotid artery stenosis, Charcot foot due to diabetes mellitus ([facility]), CHF (congestive heart failure) ([facility]), CKD (chronic kidney disease) stage 2, GFR 60-89 ml/min, Coronary artery disease, Coronary artery disease involving native heart with angina pectoris ([facility]), De [place] thyroiditis, Diabetic peripheral neuropathy ([facility]), Diabetic polyneuropathy associated with type 1 diabetes mellitus ([facility]), Diastolic HF (heart failure) ([facility]), Essential hypertension, History of COVID-19, Hyperlipidemia, Hyperlipidemia with target LDL less than 70, Hyperparathyroidism due to renal insufficiency ([facility]), Hypertension goal BP (blood pressure) < 130/80, Peritonitis associated with peritoneal dialysis ([facility]), PVD (peripheral vascular disease) ([facility]), S/P CABG (coronary artery bypass graft), Seizure disorder ([facility]), Seizures ([facility]), Stenosis of carotid artery, Toe osteomyelitis, left ([facility]), and Type 1

## 2024-07-16 NOTE — CONSULTS
Called for recs regarding acute neurologic deficits.     LKW a little after midnight before going to sleep. Woke up about thirty minutes prior to arriving in the ED with headache, chest pain, and upon eval in the ED was also noted to have some mild dysarthria and significant left lower extremity weakness.     Note that I did not perform a full NIHSS assessment due to the fact that my telemedicine eval was occurring at the very edge of the window for TNK administration. I rely on the NIHSS documented by the ED and note that he had marked difficulty raising his left leg off of the bed and although he was eventually able to sit upright and transfer his body to the edge of the bed, he required two people to stand up and could bear no weight at all on the left leg.     Reviewed imaging: NCHCT no acute findings, ASPECTS 10. CTA with scattered atherosclerosis, no intracranial large vessel occlusion.     Pt experiencing disabling symptoms and there are no contraindications to TNK identified by chart review.     Discussed risks, benefits, and alternatives to TNK with pt including roughly one third chance each of improvement, no improvement, and progression; ~ 6% chance of symptomatic ICH, 3% chance of fatal ICH; and no acute treatment followed by secondary prevention and rehabilitation.     Pt's questions were answered and he opted to proceed with administration of TNK.     Recs:  - SBP < 180  - NPO until speech/swallow eval  - Surveillance neuroimaging (non-con CT head or MRI brain w/o contrast) 24 hours after TNK to eval for hemorrhagic conversion  - No antiplatelet or anticoagulant medications until stable neuroimaging obtained        Bassam Hannon MD   stroke team     Telemedicine time 45 minutes

## 2024-07-16 NOTE — PROGRESS NOTES
[facility] - Inpatient Rehabilitation Department   Phone: [phone]    Physical Therapy    [x] Initial Evaluation            [] Daily Treatment Note         [] Discharge Summary      Patient: Te Garay   : 1965   MRN: [id number]   Date of Service:  2024  Admitting Diagnosis: CVA (cerebral vascular accident) ([facility])  Current Admission Summary: The patient is a 59 y.o. male with significant past medical history of type 1 diabetes diagnosed when he was 11-year-old, ESRD on peritoneal dialysis for the last 4 to 5 years, coronary artery disease s/p CABG, carotid artery stenosis s/p stent placement in the right ICA, left foot infection hypertension, hyperlipidemia, nephrolithiasis, presented to the hospital this time with having woken up with sudden onset of headache chest pain, in the ED he was evaluated and showed dysarthria and left lower lobe extremity weakness.  He was seen by Dr. Hannon of the stroke team., was given tenelctapase.  Past Medical History:  has a past medical history of Angina at rest ([facility]), Cardiomyopathy ([facility]), Carotid artery stenosis, Charcot foot due to diabetes mellitus ([facility]), CHF (congestive heart failure) ([facility]), CKD (chronic kidney disease) stage 2, GFR 60-89 ml/min, Coronary artery disease, Coronary artery disease involving native heart with angina pectoris ([facility]), De Quervain thyroiditis, Diabetic peripheral neuropathy ([facility]), Diabetic polyneuropathy associated with type 1 diabetes mellitus ([facility]), Diastolic HF (heart failure) ([facility]), Essential hypertension, History of COVID-19, Hyperlipidemia, Hyperlipidemia with target LDL less than 70, Hyperparathyroidism due to renal insufficiency ([facility]), Hypertension goal BP (blood pressure) < 130/80, Peritonitis associated with peritoneal dialysis ([facility]), PVD (peripheral vascular disease) ([facility]), S/P CABG (coronary artery bypass graft), Seizure disorder ([facility]), Seizures ([facility]), Stenosis of carotid artery, Toe osteomyelitis, left ([facility]),  reach, gait belt, patient at risk for falls, nurse notified, and family/caregiver present    Plan  Frequency: 5-7 x/week  Current Treatment Recommendations: strengthening, ROM, balance training, functional mobility training, transfer training, gait training, stair training, endurance training, neuromuscular re-education, modalities, patient/caregiver education, home exercise program, safety education, equipment evaluation/education, and positioning    Goals  Patient Goals: return home   Short Term Goals:  Time Frame: d/c  Patient will complete bed mobility at modified independent   Patient will complete transfers at minimal assistance   Patient will ambulate 25 ft with use of rolling walker at moderate assistance    Above goals reviewed on 7/16/2024.  All goals are ongoing at this time unless indicated above.      Therapy Session Time      Individual Group Co-treatment   Time In     1350   Time Out     1505   Minutes     75     Timed Code Treatment Minutes:  60 Minutes  Total Treatment Minutes:  75 minutes       Electronically Signed By: LUNA Paulson    PT providing direct supervision during session and assisting in making skilled judgements throughout session.     Adilia Pinto PT, DPT [id number]

## 2024-07-17 ENCOUNTER — APPOINTMENT (OUTPATIENT)
Dept: GENERAL RADIOLOGY | Age: 59
DRG: 061 | End: 2024-07-17
Payer: MEDICARE

## 2024-07-17 ENCOUNTER — APPOINTMENT (OUTPATIENT)
Dept: CT IMAGING | Age: 59
DRG: 061 | End: 2024-07-17
Payer: MEDICARE

## 2024-07-17 PROBLEM — I42.9 CARDIOMYOPATHY (HCC): Status: ACTIVE | Noted: 2024-07-17

## 2024-07-17 PROBLEM — J81.0 ACUTE PULMONARY EDEMA (HCC): Status: ACTIVE | Noted: 2024-07-17

## 2024-07-17 LAB
ANION GAP SERPL CALCULATED.3IONS-SCNC: 14 MMOL/L (ref 3–16)
ANION GAP SERPL CALCULATED.3IONS-SCNC: 17 MMOL/L (ref 3–16)
BASE EXCESS BLDV CALC-SCNC: -2.6 MMOL/L (ref -3–3)
BASOPHILS # BLD: 0 K/UL (ref 0–0.2)
BASOPHILS NFR BLD: 0.2 %
BUN SERPL-MCNC: 54 MG/DL (ref 7–20)
BUN SERPL-MCNC: 62 MG/DL (ref 7–20)
CALCIUM SERPL-MCNC: 8.4 MG/DL (ref 8.3–10.6)
CALCIUM SERPL-MCNC: 8.9 MG/DL (ref 8.3–10.6)
CHLORIDE SERPL-SCNC: 95 MMOL/L (ref 99–110)
CHLORIDE SERPL-SCNC: 98 MMOL/L (ref 99–110)
CHOLEST SERPL-MCNC: 162 MG/DL (ref 0–199)
CO2 BLDV-SCNC: 51 MMOL/L
CO2 SERPL-SCNC: 20 MMOL/L (ref 21–32)
CO2 SERPL-SCNC: 21 MMOL/L (ref 21–32)
COHGB MFR BLDV: 4.8 % (ref 0–1.5)
CREAT SERPL-MCNC: 7.8 MG/DL (ref 0.9–1.3)
CREAT SERPL-MCNC: 8.9 MG/DL (ref 0.9–1.3)
DEPRECATED RDW RBC AUTO: 15.1 % (ref 12.4–15.4)
ECHO AV MEAN GRADIENT: 5 MMHG
ECHO AV MEAN VELOCITY: 1 M/S
ECHO AV PEAK GRADIENT: 7 MMHG
ECHO AV PEAK VELOCITY: 1.4 M/S
ECHO AV VELOCITY RATIO: 0.64
ECHO AV VTI: 21.7 CM
ECHO BSA: 2.03 M2
ECHO EST RA PRESSURE: 8 MMHG
ECHO LA AREA 4C: 26.6 CM2
ECHO LA DIAMETER INDEX: 2.6 CM/M2
ECHO LA DIAMETER: 5.2 CM
ECHO LA MAJOR AXIS: 5.6 CM
ECHO LA VOL MOD A4C: 101 ML (ref 18–58)
ECHO LA VOLUME INDEX MOD A4C: 51 ML/M2 (ref 16–34)
ECHO LV E' LATERAL VELOCITY: 8 CM/S
ECHO LV FRACTIONAL SHORTENING: 17 % (ref 28–44)
ECHO LV INTERNAL DIMENSION DIASTOLE INDEX: 3.2 CM/M2
ECHO LV INTERNAL DIMENSION DIASTOLIC: 6.4 CM (ref 4.2–5.9)
ECHO LV INTERNAL DIMENSION SYSTOLIC INDEX: 2.65 CM/M2
ECHO LV INTERNAL DIMENSION SYSTOLIC: 5.3 CM
ECHO LV IVSD: 0.6 CM (ref 0.6–1)
ECHO LV MASS 2D: 147.7 G (ref 88–224)
ECHO LV MASS INDEX 2D: 73.9 G/M2 (ref 49–115)
ECHO LV POSTERIOR WALL DIASTOLIC: 0.6 CM (ref 0.6–1)
ECHO LV RELATIVE WALL THICKNESS RATIO: 0.19
ECHO LVOT AV VTI INDEX: 0.85
ECHO LVOT MEAN GRADIENT: 2 MMHG
ECHO LVOT PEAK GRADIENT: 3 MMHG
ECHO LVOT PEAK VELOCITY: 0.9 M/S
ECHO LVOT VTI: 18.4 CM
ECHO MV E VELOCITY: 1.37 M/S
ECHO MV E/E' LATERAL: 17.13
ECHO MV LVOT VTI INDEX: 0.98
ECHO MV MAX VELOCITY: 1.5 M/S
ECHO MV MEAN GRADIENT: 4 MMHG
ECHO MV MEAN VELOCITY: 0.9 M/S
ECHO MV PEAK GRADIENT: 9 MMHG
ECHO MV VTI: 18.1 CM
ECHO PV MAX VELOCITY: 0.9 M/S
ECHO PV MEAN GRADIENT: 1 MMHG
ECHO PV MEAN VELOCITY: 0.5 M/S
ECHO PV PEAK GRADIENT: 3 MMHG
ECHO PV VTI: 12.6 CM
ECHO RIGHT VENTRICULAR SYSTOLIC PRESSURE (RVSP): 42 MMHG
ECHO RV INTERNAL DIMENSION: 2.5 CM
ECHO TV REGURGITANT MAX VELOCITY: 2.92 M/S
ECHO TV REGURGITANT PEAK GRADIENT: 34 MMHG
EKG ATRIAL RATE: 106 BPM
EKG ATRIAL RATE: 109 BPM
EKG ATRIAL RATE: 118 BPM
EKG ATRIAL RATE: 85 BPM
EKG DIAGNOSIS: NORMAL
EKG P AXIS: 48 DEGREES
EKG P AXIS: 63 DEGREES
EKG P AXIS: 67 DEGREES
EKG P AXIS: 70 DEGREES
EKG P-R INTERVAL: 152 MS
EKG P-R INTERVAL: 156 MS
EKG P-R INTERVAL: 158 MS
EKG P-R INTERVAL: 164 MS
EKG Q-T INTERVAL: 350 MS
EKG Q-T INTERVAL: 364 MS
EKG Q-T INTERVAL: 374 MS
EKG Q-T INTERVAL: 402 MS
EKG QRS DURATION: 132 MS
EKG QRS DURATION: 134 MS
EKG QRS DURATION: 134 MS
EKG QRS DURATION: 136 MS
EKG QTC CALCULATION (BAZETT): 478 MS
EKG QTC CALCULATION (BAZETT): 483 MS
EKG QTC CALCULATION (BAZETT): 490 MS
EKG QTC CALCULATION (BAZETT): 503 MS
EKG R AXIS: 44 DEGREES
EKG R AXIS: 47 DEGREES
EKG R AXIS: 59 DEGREES
EKG R AXIS: 89 DEGREES
EKG T AXIS: 194 DEGREES
EKG T AXIS: 82 DEGREES
EKG T AXIS: 85 DEGREES
EKG T AXIS: 96 DEGREES
EKG VENTRICULAR RATE: 106 BPM
EKG VENTRICULAR RATE: 109 BPM
EKG VENTRICULAR RATE: 118 BPM
EKG VENTRICULAR RATE: 85 BPM
EOSINOPHIL # BLD: 0 K/UL (ref 0–0.6)
EOSINOPHIL NFR BLD: 0 %
GFR SERPLBLD CREATININE-BSD FMLA CKD-EPI: 6 ML/MIN/{1.73_M2}
GFR SERPLBLD CREATININE-BSD FMLA CKD-EPI: 7 ML/MIN/{1.73_M2}
GLUCOSE BLD-MCNC: 148 MG/DL (ref 70–99)
GLUCOSE BLD-MCNC: 164 MG/DL (ref 70–99)
GLUCOSE BLD-MCNC: 307 MG/DL (ref 70–99)
GLUCOSE BLD-MCNC: 339 MG/DL (ref 70–99)
GLUCOSE BLD-MCNC: 362 MG/DL (ref 70–99)
GLUCOSE BLD-MCNC: 374 MG/DL (ref 70–99)
GLUCOSE BLD-MCNC: 395 MG/DL (ref 70–99)
GLUCOSE SERPL-MCNC: 288 MG/DL (ref 70–99)
GLUCOSE SERPL-MCNC: 356 MG/DL (ref 70–99)
HCO3 BLDV-SCNC: 21.8 MMOL/L (ref 23–29)
HCT VFR BLD AUTO: 37.1 % (ref 40.5–52.5)
HDLC SERPL-MCNC: 48 MG/DL (ref 40–60)
HGB BLD-MCNC: 11.5 G/DL (ref 13.5–17.5)
LDLC SERPL CALC-MCNC: 97 MG/DL
LYMPHOCYTES # BLD: 0.6 K/UL (ref 1–5.1)
LYMPHOCYTES NFR BLD: 5 %
MCH RBC QN AUTO: 29.3 PG (ref 26–34)
MCHC RBC AUTO-ENTMCNC: 31.1 G/DL (ref 31–36)
MCV RBC AUTO: 94.1 FL (ref 80–100)
METHGB MFR BLDV: 0.3 %
MONOCYTES # BLD: 0.8 K/UL (ref 0–1.3)
MONOCYTES NFR BLD: 6.9 %
NEUTROPHILS # BLD: 9.7 K/UL (ref 1.7–7.7)
NEUTROPHILS NFR BLD: 87.9 %
O2 CT VFR BLDV CALC: 16 VOL %
O2 THERAPY: ABNORMAL
PCO2 BLDV: 35.8 MMHG (ref 40–50)
PERFORMED ON: ABNORMAL
PH BLDV: 7.39 [PH] (ref 7.35–7.45)
PLATELET # BLD AUTO: 191 K/UL (ref 135–450)
PMV BLD AUTO: 9.4 FL (ref 5–10.5)
PO2 BLDV: 119 MMHG (ref 25–40)
POTASSIUM SERPL-SCNC: 5.5 MMOL/L (ref 3.5–5.1)
POTASSIUM SERPL-SCNC: 5.7 MMOL/L (ref 3.5–5.1)
POTASSIUM SERPL-SCNC: 6.4 MMOL/L (ref 3.5–5.1)
RBC # BLD AUTO: 3.94 M/UL (ref 4.2–5.9)
SAO2 % BLDV: 99 %
SODIUM SERPL-SCNC: 132 MMOL/L (ref 136–145)
SODIUM SERPL-SCNC: 133 MMOL/L (ref 136–145)
TRIGL SERPL-MCNC: 86 MG/DL (ref 0–150)
VLDLC SERPL CALC-MCNC: 17 MG/DL
WBC # BLD AUTO: 11.1 K/UL (ref 4–11)

## 2024-07-17 PROCEDURE — 6360000002 HC RX W HCPCS: Performed by: STUDENT IN AN ORGANIZED HEALTH CARE EDUCATION/TRAINING PROGRAM

## 2024-07-17 PROCEDURE — 2000000000 HC ICU R&B

## 2024-07-17 PROCEDURE — 97535 SELF CARE MNGMENT TRAINING: CPT

## 2024-07-17 PROCEDURE — 97116 GAIT TRAINING THERAPY: CPT

## 2024-07-17 PROCEDURE — 2580000003 HC RX 258: Performed by: INTERNAL MEDICINE

## 2024-07-17 PROCEDURE — 2700000000 HC OXYGEN THERAPY PER DAY

## 2024-07-17 PROCEDURE — 94640 AIRWAY INHALATION TREATMENT: CPT

## 2024-07-17 PROCEDURE — 93010 ELECTROCARDIOGRAM REPORT: CPT | Performed by: INTERNAL MEDICINE

## 2024-07-17 PROCEDURE — 94761 N-INVAS EAR/PLS OXIMETRY MLT: CPT

## 2024-07-17 PROCEDURE — 5A09357 ASSISTANCE WITH RESPIRATORY VENTILATION, LESS THAN 24 CONSECUTIVE HOURS, CONTINUOUS POSITIVE AIRWAY PRESSURE: ICD-10-PCS | Performed by: STUDENT IN AN ORGANIZED HEALTH CARE EDUCATION/TRAINING PROGRAM

## 2024-07-17 PROCEDURE — 6370000000 HC RX 637 (ALT 250 FOR IP): Performed by: PEDIATRICS

## 2024-07-17 PROCEDURE — 82803 BLOOD GASES ANY COMBINATION: CPT

## 2024-07-17 PROCEDURE — 84132 ASSAY OF SERUM POTASSIUM: CPT

## 2024-07-17 PROCEDURE — 80048 BASIC METABOLIC PNL TOTAL CA: CPT

## 2024-07-17 PROCEDURE — 93306 TTE W/DOPPLER COMPLETE: CPT | Performed by: INTERNAL MEDICINE

## 2024-07-17 PROCEDURE — 6370000000 HC RX 637 (ALT 250 FOR IP): Performed by: NURSE PRACTITIONER

## 2024-07-17 PROCEDURE — 6360000002 HC RX W HCPCS: Performed by: PEDIATRICS

## 2024-07-17 PROCEDURE — 6370000000 HC RX 637 (ALT 250 FOR IP): Performed by: HOSPITALIST

## 2024-07-17 PROCEDURE — 70450 CT HEAD/BRAIN W/O DYE: CPT

## 2024-07-17 PROCEDURE — 99233 SBSQ HOSP IP/OBS HIGH 50: CPT | Performed by: PSYCHIATRY & NEUROLOGY

## 2024-07-17 PROCEDURE — 99223 1ST HOSP IP/OBS HIGH 75: CPT | Performed by: INTERNAL MEDICINE

## 2024-07-17 PROCEDURE — 97530 THERAPEUTIC ACTIVITIES: CPT

## 2024-07-17 PROCEDURE — 97112 NEUROMUSCULAR REEDUCATION: CPT

## 2024-07-17 PROCEDURE — 6370000000 HC RX 637 (ALT 250 FOR IP): Performed by: STUDENT IN AN ORGANIZED HEALTH CARE EDUCATION/TRAINING PROGRAM

## 2024-07-17 PROCEDURE — 71045 X-RAY EXAM CHEST 1 VIEW: CPT

## 2024-07-17 PROCEDURE — APPNB30 APP NON BILLABLE TIME 0-30 MINS

## 2024-07-17 PROCEDURE — 85025 COMPLETE CBC W/AUTO DIFF WBC: CPT

## 2024-07-17 PROCEDURE — APPSS30 APP SPLIT SHARED TIME 16-30 MINUTES

## 2024-07-17 PROCEDURE — 36415 COLL VENOUS BLD VENIPUNCTURE: CPT

## 2024-07-17 PROCEDURE — 93005 ELECTROCARDIOGRAM TRACING: CPT | Performed by: PEDIATRICS

## 2024-07-17 PROCEDURE — 92526 ORAL FUNCTION THERAPY: CPT

## 2024-07-17 PROCEDURE — 94660 CPAP INITIATION&MGMT: CPT

## 2024-07-17 PROCEDURE — 2580000003 HC RX 258: Performed by: HOSPITALIST

## 2024-07-17 PROCEDURE — 90945 DIALYSIS ONE EVALUATION: CPT

## 2024-07-17 PROCEDURE — 6360000002 HC RX W HCPCS: Performed by: INTERNAL MEDICINE

## 2024-07-17 RX ORDER — IPRATROPIUM BROMIDE AND ALBUTEROL SULFATE 2.5; .5 MG/3ML; MG/3ML
1 SOLUTION RESPIRATORY (INHALATION) EVERY 4 HOURS
Status: DISCONTINUED | OUTPATIENT
Start: 2024-07-17 | End: 2024-07-17

## 2024-07-17 RX ORDER — IPRATROPIUM BROMIDE AND ALBUTEROL SULFATE 2.5; .5 MG/3ML; MG/3ML
1 SOLUTION RESPIRATORY (INHALATION) EVERY 4 HOURS PRN
Status: DISCONTINUED | OUTPATIENT
Start: 2024-07-17 | End: 2024-07-22 | Stop reason: HOSPADM

## 2024-07-17 RX ORDER — GLUCAGON 1 MG/ML
1 KIT INJECTION PRN
Status: DISCONTINUED | OUTPATIENT
Start: 2024-07-17 | End: 2024-07-17

## 2024-07-17 RX ORDER — DEXTROSE MONOHYDRATE 100 MG/ML
INJECTION, SOLUTION INTRAVENOUS CONTINUOUS PRN
Status: DISCONTINUED | OUTPATIENT
Start: 2024-07-17 | End: 2024-07-17

## 2024-07-17 RX ORDER — INSULIN LISPRO 100 [IU]/ML
0-16 INJECTION, SOLUTION INTRAVENOUS; SUBCUTANEOUS
Status: DISCONTINUED | OUTPATIENT
Start: 2024-07-17 | End: 2024-07-22

## 2024-07-17 RX ORDER — INSULIN LISPRO 100 [IU]/ML
0-4 INJECTION, SOLUTION INTRAVENOUS; SUBCUTANEOUS NIGHTLY
Status: DISCONTINUED | OUTPATIENT
Start: 2024-07-17 | End: 2024-07-17

## 2024-07-17 RX ORDER — INSULIN LISPRO 100 [IU]/ML
0-4 INJECTION, SOLUTION INTRAVENOUS; SUBCUTANEOUS NIGHTLY
Status: DISCONTINUED | OUTPATIENT
Start: 2024-07-17 | End: 2024-07-22

## 2024-07-17 RX ORDER — FUROSEMIDE 10 MG/ML
60 INJECTION INTRAMUSCULAR; INTRAVENOUS ONCE
Status: COMPLETED | OUTPATIENT
Start: 2024-07-17 | End: 2024-07-17

## 2024-07-17 RX ORDER — INSULIN LISPRO 100 [IU]/ML
0-4 INJECTION, SOLUTION INTRAVENOUS; SUBCUTANEOUS
Status: DISCONTINUED | OUTPATIENT
Start: 2024-07-17 | End: 2024-07-17

## 2024-07-17 RX ORDER — INSULIN LISPRO 100 [IU]/ML
2 INJECTION, SOLUTION INTRAVENOUS; SUBCUTANEOUS ONCE
Status: COMPLETED | OUTPATIENT
Start: 2024-07-17 | End: 2024-07-17

## 2024-07-17 RX ADMIN — NITROGLYCERIN 0.5 INCH: 20 OINTMENT TOPICAL at 15:01

## 2024-07-17 RX ADMIN — SACUBITRIL AND VALSARTAN 1 TABLET: 49; 51 TABLET, FILM COATED ORAL at 09:04

## 2024-07-17 RX ADMIN — INSULIN LISPRO 8 UNITS: 100 INJECTION, SOLUTION INTRAVENOUS; SUBCUTANEOUS at 09:03

## 2024-07-17 RX ADMIN — HEPARIN SODIUM: 1000 INJECTION INTRAVENOUS; SUBCUTANEOUS at 14:49

## 2024-07-17 RX ADMIN — IPRATROPIUM BROMIDE AND ALBUTEROL SULFATE 1 DOSE: .5; 3 SOLUTION RESPIRATORY (INHALATION) at 04:06

## 2024-07-17 RX ADMIN — SACUBITRIL AND VALSARTAN 1 TABLET: 49; 51 TABLET, FILM COATED ORAL at 22:15

## 2024-07-17 RX ADMIN — ROSUVASTATIN CALCIUM 40 MG: 40 TABLET, COATED ORAL at 18:26

## 2024-07-17 RX ADMIN — INSULIN LISPRO 12 UNITS: 100 INJECTION, SOLUTION INTRAVENOUS; SUBCUTANEOUS at 16:19

## 2024-07-17 RX ADMIN — INSULIN LISPRO 2 UNITS: 100 INJECTION, SOLUTION INTRAVENOUS; SUBCUTANEOUS at 22:49

## 2024-07-17 RX ADMIN — IPRATROPIUM BROMIDE AND ALBUTEROL SULFATE 1 DOSE: .5; 3 SOLUTION RESPIRATORY (INHALATION) at 00:08

## 2024-07-17 RX ADMIN — RANOLAZINE 500 MG: 500 TABLET, EXTENDED RELEASE ORAL at 22:15

## 2024-07-17 RX ADMIN — FUROSEMIDE 60 MG: 10 INJECTION, SOLUTION INTRAMUSCULAR; INTRAVENOUS at 02:10

## 2024-07-17 RX ADMIN — MORPHINE SULFATE 2 MG: 2 INJECTION, SOLUTION INTRAMUSCULAR; INTRAVENOUS at 14:44

## 2024-07-17 RX ADMIN — PANTOPRAZOLE SODIUM 40 MG: 40 INJECTION, POWDER, FOR SOLUTION INTRAVENOUS at 09:05

## 2024-07-17 RX ADMIN — RANOLAZINE 500 MG: 500 TABLET, EXTENDED RELEASE ORAL at 09:03

## 2024-07-17 RX ADMIN — HYDRALAZINE HYDROCHLORIDE 50 MG: 25 TABLET ORAL at 22:15

## 2024-07-17 RX ADMIN — INSULIN HUMAN 10 UNITS: 100 INJECTION, SOLUTION PARENTERAL at 09:57

## 2024-07-17 RX ADMIN — SODIUM CHLORIDE, PRESERVATIVE FREE 10 ML: 5 INJECTION INTRAVENOUS at 22:19

## 2024-07-17 RX ADMIN — HYDRALAZINE HYDROCHLORIDE 50 MG: 25 TABLET ORAL at 09:04

## 2024-07-17 RX ADMIN — INSULIN LISPRO 4 UNITS: 100 INJECTION, SOLUTION INTRAVENOUS; SUBCUTANEOUS at 02:32

## 2024-07-17 RX ADMIN — ASPIRIN 81 MG 81 MG: 81 TABLET ORAL at 09:04

## 2024-07-17 RX ADMIN — INSULIN LISPRO 4 UNITS: 100 INJECTION, SOLUTION INTRAVENOUS; SUBCUTANEOUS at 22:48

## 2024-07-17 RX ADMIN — MORPHINE SULFATE 2 MG: 2 INJECTION, SOLUTION INTRAMUSCULAR; INTRAVENOUS at 22:18

## 2024-07-17 RX ADMIN — SODIUM CHLORIDE, PRESERVATIVE FREE 10 ML: 5 INJECTION INTRAVENOUS at 09:05

## 2024-07-17 ASSESSMENT — PAIN DESCRIPTION - DESCRIPTORS
DESCRIPTORS: SHARP
DESCRIPTORS: ACHING

## 2024-07-17 ASSESSMENT — PAIN SCALES - GENERAL
PAINLEVEL_OUTOF10: 2
PAINLEVEL_OUTOF10: 7
PAINLEVEL_OUTOF10: 2
PAINLEVEL_OUTOF10: 4

## 2024-07-17 ASSESSMENT — PAIN DESCRIPTION - LOCATION
LOCATION: CHEST

## 2024-07-17 ASSESSMENT — PAIN DESCRIPTION - ONSET: ONSET: PROGRESSIVE

## 2024-07-17 ASSESSMENT — PAIN DESCRIPTION - FREQUENCY: FREQUENCY: INTERMITTENT

## 2024-07-17 ASSESSMENT — PAIN DESCRIPTION - ORIENTATION: ORIENTATION: LEFT;MID

## 2024-07-17 ASSESSMENT — PAIN - FUNCTIONAL ASSESSMENT: PAIN_FUNCTIONAL_ASSESSMENT: ACTIVITIES ARE NOT PREVENTED

## 2024-07-17 ASSESSMENT — PAIN DESCRIPTION - PAIN TYPE: TYPE: CHRONIC PAIN

## 2024-07-17 NOTE — PROGRESS NOTES
ECHO performed at bedside.  Consents obtained for MRI & Inpatient Insulin Pump-Pt Responsibilities Form.

## 2024-07-17 NOTE — PROGRESS NOTES
No response from Dr López.  Pt continues with chest pain.  Dialysis nurse called to see if they can come set up pt's dialysis early as pt feels his pain is from fluid shifting  up & around his heart.  Dialysis nurse will be here within an hour.  Pt & wife notified & verbalized understanding.

## 2024-07-17 NOTE — PROGRESS NOTES
will complete grooming at stand by assistance   Patient will complete functional transfers at minimal assistance - Goal Met 7/17, upgrade to SBA  Patient will complete functional mobility at moderate assistance - Goal Met 7/17, upgrade to SBA    Above goals reviewed on 7/17/2024.  All goals are ongoing at this time unless indicated above.       Therapy Session Time     Individual Group Co-treatment   Time In    1006   Time Out    1140   Minutes    94        Timed Code Treatment Minutes:   94  Total Treatment Minutes:  94       Electronically Signed By: Gamal Barkley, ZULEYMA Barkley, ANA, OTR/L, CNS ([id number])

## 2024-07-17 NOTE — DIALYSIS
Per ICU RN Luica Allan patient was taken off HD at 0200 and disconnected.       Treatment time: 6 Hours 7 minutes  Net UF: 16 ml  Dwell Time: 2 hours 31 minutes Lost    Effluent clear  and lines taped to patient per protocol. Patient resting comfortably with VSS upon exiting room.      Copy of dialysis treatment record placed in chart, to be scanned into EMR and report given to Lucia Allan RN.

## 2024-07-17 NOTE — PROGRESS NOTES
Te Garay  Neurology Follow-up  [facility] Neurology    Date of Service: 7/17/2024    Subjective:   CC: Follow up today regarding: Acute speech disturbance, left-sided weakness, possible new stroke, status post TNK    Events noted. Chart and lab reviewed.  Patient seen this morning he is resting in bed wife is at bedside.  We discussed MRI results which did not show any acute abnormality.  Patient noted to have significant chest pain overnight at emergent echocardiogram and CT chest.  Repeat CT head showed no bleed.  Patient also had difficulty with his peritoneal dialysis overnight.  This morning patient reports he is feeling improved.  Still having difficulty with his left leg.  He was able to stand up with therapy yesterday along with nursing staff.   He denies any difficulty with his left arm.  He denies headache, dizziness, dysarthria or dysphagia.  Other review of systems unremarkable.      ROS : A 10-12 system review obtained and updated today and is unremarkable except as mentioned  in my interval history.     Past medical history, social history, medication and family history reviewed.       Objective:  Exam:   Constitutional:   Vitals:    07/17/24 0944 07/17/24 1000 07/17/24 1201 07/17/24 1234   BP:  129/72 102/60    Pulse: (!) 101 (!) 101 97 94   Resp: 18 23 21 23   Temp:   97.5 °F (36.4 °C)    TempSrc:   Temporal    SpO2: 94% 98% 98% 96%   Weight:       Height:         General appearance:  Normal development and appear in no acute distress.   Mental Status:   Oriented to person, place, problem  Memory: Good immediate recall.  Intact remote memory  Good attention span and concentration.  Language: intact naming, repeating and fluency   Good fund of Knowledge.   Cranial Nerves:   II:   Pupils: equal, round, reactive to light  III,IV,VI: Extra Ocular Movements are intact. No nystagmus  V: Facial sensation is intact  VII: Facial strength and movements: intact and symmetric  XII: Tongue movements are  agree with the note which has been adjusted to reflect my findings, with the addition of the following:     No new symptoms today.  MRI showed no acute stroke  Patient is about the same  No focal finding or new weakness  AOx3  Intermittent myoclonus  CN II to XII are normal  Still unsteady    Acute encephalopathy and left-sided weakness seems to be improving.  Possible TIA.  Metabolic encephalopathy  Hypertension, not controlled  End-stage renal disease  Seizure disorder        Continue current supportive care  PT, OT and speech  Avoid low blood pressure below 120-80  Telemetry  Continue dialysis  Insulin sliding scale  Diabetic control  Aspirin statin  Continue Keppra the same dose and watch for creatinine  ARU consultation  ?  Event monitor outpatient if he has not had that in the past  Stroke education provided  DC planning  No further recommendation      Electronically signed by Vijay Bui MD on 7/17/24 at 2:02 PM EDT         This dictation was generated by voice recognition computer software. Although all attempts are made to edit the dictation for accuracy, there may be errors in the transcription that are not intended.

## 2024-07-17 NOTE — CONSULTS
progress:    PT    Rolling: Level of difficulty - None   Sit to Stand from a Chair: Level of difficulty - A Lot  Supine to Sit: Level of difficulty - None     Bed to Chair: Physical Assistance Required - Total  Ambulate Across Room: Physical Assistance Required - Total  Ascend 3-5 Steps With HR: Physical Assistance Required - Total    OT    Eating: Physical Assistance Required - A Little  Grooming: Physical Assistance Required - A Little  LB Dressing: Physical Assistance Required - A Lot  UB Dressing: Physical Assistance Required - A Little  Bathing: Physical Assistance Required - A Lot  Toileting: Physical Assistance Required - A Lot    SLP         Body mass index is 28.89 kg/m².       Assessment:  Acute left sided weakness: Received TNK. MRI Brain without evidence of acute infarct. Possible TIA/averted CVA, vs alternative neurological etiology of persistent leg weakness (such as lumbar radiculopathy, lumbosacral plexopathy, diabetic amyotrophy, asymmetrical peripheral polyneuropathy).     DM1  Diabetic polyneuropathy  Charcot foot, left    Impairments: Left leg weakness limiting independence with functional mobility and ADLs.     Plan:   - PM&R will continue to follow to determine rehabilitation needs, pending completion of medical work-up.     Carlos Leone MD 7/16/2024, 10:31 PM    * This document was created using dictation software.  While all precautions were taken to ensure accuracy, errors may have occurred.  Please disregard any typographical errors.

## 2024-07-17 NOTE — PROGRESS NOTES
Hospitalist Progress Note    Name:  Te Garay    /Age/Sex: 1965  (59 y.o. male)  MRN & CSN:  [id number] & [id number]    PCP: Reid Lei MD    Date of Admission: 2024    Patient Status:  Inpatient     Chief Complaint:   Chief Complaint   Patient presents with    Headache     PT arrived [facility] from home pt receives dialysis at home when he suddenly began to act abnormal according to the wife. Pt states he has left foot pain and a severe headache and feels dehydrated.        Hospital Course:   Admitted for CVA. Given TNK at 0500 on . Neurology consulted.    All neurological imaging has been negative.    ESRD patient on PD. Nephrology consulted. Did not have PD performed on , which caused electrolyte dysfunction and some additional fluid overload.     Subjective:  Today is:  Hospital Day: 2.  Patient seen and examined in ICU-3911/3911-01.     Sitting up in bed. Doing better today. Feels a bit fluid overloaded, but no shortness of breath. Denies pain.      Medications:  Reviewed    Infusion Medications    sodium chloride      dextrose       Scheduled Medications    insulin lispro  0-4 Units SubCUTAneous Nightly    insulin lispro  0-16 Units SubCUTAneous TID WC    dianeal lo-miller 2.5% 2,000 mL with heparin (porcine) 250 Units solution   IntraPERitoneal Q3H    sodium chloride flush  5-40 mL IntraVENous 2 times per day    sodium chloride flush  5-40 mL IntraVENous 2 times per day    [Held by provider] aspirin  81 mg Oral Daily    Or    [Held by provider] aspirin  300 mg Rectal Daily    hydrALAZINE  50 mg Oral TID    [Held by provider] isosorbide mononitrate  30 mg Oral Daily    [Held by provider] isosorbide mononitrate  90 mg Oral Daily    ranolazine  500 mg Oral BID    rosuvastatin  40 mg Oral QPM    sacubitril-valsartan  1 tablet Oral BID    pantoprazole  40 mg IntraVENous Daily    nitroglycerin  0.5 inch Topical 4 times per day     PRN Meds: ipratropium 0.5 mg-albuterol 2.5 mg,  incidental findings as above for which no dedicated follow-up   is recommended.      RECOMMENDATIONS:   Incidental Thyroid Nodules on CT or MRI      - No suspicious findings, age >=35 years      <1.5 cm:  No follow-up      Reference:  Aiden et al.  Managing incidental thyroid nodules detected on   imaging: White paper of the ACR Incidental Thyroid Findings Committee.  J Am   Adan Radiol 2015;12:143-150.         XR CHEST PORTABLE   Final Result   Cardiomegaly with diffuse increased interstitial opacities, worsened compared   to prior exam, probably interstitial edema.         MRI BRAIN WO CONTRAST   Final Result   No acute cortical infarct or other acute intracranial process.         XR CHEST PORTABLE   Final Result   Silhouetting of the left heart border may be due to atelectasis or pneumonia.         XR CHEST PORTABLE   Preliminary Result   1. No acute cardiopulmonary process.   2. Status post CABG.         CTA HEAD NECK W CONTRAST   Final Result   1. No significant carotid stenosis.   2. Patent endovascular stent in the proximal segment of right internal   carotid artery.   3. Calcified plaques at the origin and proximal segment of the left internal   carotid artery with 50% stenosis.   4. Calcified plaques in the lower portion of the cervical segment of the   right vertebral artery with mild stenosis.   5. No significant intracranial arterial stenosis or occlusion.   6. No evidence of aneurysm.   7. No acute intracranial abnormality on the precontrast CT scan of head on   07/16/2024 at 0404 hours.         CT HEAD WO CONTRAST   Final Result   No acute intracranial abnormality.      Findings were discussed with VERONICA Goldman by Paola Wallace on   07/16/2024 at 4:13 a.m.                 Assessment/Plan:    Active Hospital Problems    Diagnosis     Received intravenous tissue plasminogen activator (tPA) in emergency department [Z92.82]     Type 1 diabetes mellitus (HCC) [E10.9]     Myoclonus [G25.3]     Seizures

## 2024-07-17 NOTE — PROGRESS NOTES
Back from MRI  Pt tolerated fair.  Upon placing pt on MRI cart, pt experienced immense amt of chest pain while lying flat.  Dr Blanchard was called & MD ordered Morphine 4 mg IVP & Ativan 1 mg po once.  MRI was then able to be done.  Upon return to room, pt still having episodes of immense pain.  Pain is somewhat alleviated when pt stands on side of bed.

## 2024-07-17 NOTE — PROGRESS NOTES
[facility] - Inpatient Rehabilitation Department   Phone: [phone]    Physical Therapy    [] Initial Evaluation            [x] Daily Treatment Note         [] Discharge Summary      Patient: Te Garay   : 1965   MRN: [id number]   Date of Service:  2024  Admitting Diagnosis: CVA (cerebral vascular accident) ([facility])  Current Admission Summary: The patient is a 59 y.o. male with significant past medical history of type 1 diabetes diagnosed when he was 11-year-old, ESRD on peritoneal dialysis for the last 4 to 5 years, coronary artery disease s/p CABG, carotid artery stenosis s/p stent placement in the right ICA, left foot infection hypertension, hyperlipidemia, nephrolithiasis, presented to the hospital this time with having woken up with sudden onset of headache chest pain, in the ED he was evaluated and showed dysarthria and left lower lobe extremity weakness.  He was seen by Dr. Hannon of the stroke team., was given tenelctapase.  Past Medical History:  has a past medical history of Angina at rest ([facility]), Cardiomyopathy ([facility]), Carotid artery stenosis, Charcot foot due to diabetes mellitus ([facility]), CHF (congestive heart failure) ([facility]), CKD (chronic kidney disease) stage 2, GFR 60-89 ml/min, Coronary artery disease, Coronary artery disease involving native heart with angina pectoris ([facility]), De Quervain thyroiditis, Diabetic peripheral neuropathy ([facility]), Diabetic polyneuropathy associated with type 1 diabetes mellitus ([facility]), Diastolic HF (heart failure) ([facility]), Essential hypertension, History of COVID-19, Hyperlipidemia, Hyperlipidemia with target LDL less than 70, Hyperparathyroidism due to renal insufficiency ([facility]), Hypertension goal BP (blood pressure) < 130/80, Peritonitis associated with peritoneal dialysis ([facility]), PVD (peripheral vascular disease) ([facility]), S/P CABG (coronary artery bypass graft), Seizure disorder ([facility]), Seizures ([facility]), Stenosis of carotid artery, Toe osteomyelitis, left ([facility]),  is limited by fatigue, endurance, and muscular weakness. No (L) LE buckling noted at this date.  Patient would benefit from additional skilled PT upon discharge to promote independence and safety with functional mobility.    Safety Interventions: patient left in bed, bed alarm in place, call light within reach, gait belt, patient at risk for falls, nurse notified, and family/caregiver present    Plan  Frequency: 5-7 x/week  Current Treatment Recommendations: strengthening, ROM, balance training, functional mobility training, transfer training, gait training, stair training, endurance training, neuromuscular re-education, modalities, patient/caregiver education, home exercise program, safety education, equipment evaluation/education, and positioning    Goals  Patient Goals: return home   Short Term Goals:  Time Frame: d/c  Patient will complete bed mobility at modified independent   Patient will complete transfers at minimal assistance - goal met 7/17  Patient will ambulate 25 ft with use of rolling walker at moderate assistance - progressing    Current Goals:  Patient will complete bed mobility at modified independent   Patient will complete transfers at SBA  Patient will ambulate 50 ft with use of rolling walker at CGA    Above goals reviewed on 7/17/2024.  All goals are ongoing at this time unless indicated above.      Therapy Session Time      Individual Group Co-treatment   Time In     1006   Time Out     1140   Minutes     94     Timed Code Treatment Minutes:  94 Minutes  Total Treatment Minutes:  94 minutes       Electronically Signed By: Bridget Pepe, SPT    PT providing direct supervision during session and assisting in making skilled judgements throughout session.     Adilia Pinto PT, DPT [id number]

## 2024-07-17 NOTE — PROGRESS NOTES
Progressive shift note:   Pt received from AM RN. Bedside handoff NIHSS completed with no changes from AM scales. Pt noted to be in pain, standing at bedside with wife using walker. Reported ongoing chest pain with no relief since adx. Pt reports CP at baseline but not to current severity. PM meds given with no CP relief, PD started by dialysis RN. MD notified of chest pain, Maalox given with no relief. Sitter ordered for safety, Pt trying to stand at bedside continuously. MD requested at bedside per family and RN for worsening CP. Pt noted to have loss of consciousness while sitting at bedside, denies hitting head on bed rail when leaned back in bed. MD at bedside, stat echo obtained, cards consult placed. Sublingual nitro tablets ordered, x2 doses given- pain reported to have decreased. Nitro paste then applied to chest and morphine given. Numerous EKG's obtained during this timeframe as well. Pain noted to be increased post Echo, Stat Chest CT w contrast ordered with premeds of benadryl, Pepcid and solumedrol for allergy to iodine. Ativan and morphine given as well for anxiety and CP. Pt with waxing and wanning level of consciousness before scan, MD was notified of NIHSS changes during this time. CT orders clarified with contrast for chest CT could interfere with Head CT results with repeat safety scan, directed to proceed with chest CT. PD paused by dialysis RN for transport. PT to CT with no acute incident and tolerated scan itself without issues. During transport back pt noted to develop crackling lung sounds and increased agitation/anxiety attempting to get of bed during transport. RR called as HR dropping to 50's when previously about 100 and apparent respiratory distress. O2 sat noted to be decreasing, placed on NRB for transport back- was previously on 2L NC. Pt placed on Bipap with return to room with HR and O2 sat returning to previous rates. Unable to obtain labs at this time given TNK administration.

## 2024-07-17 NOTE — SIGNIFICANT EVENT
PD failed overnight (uncertain as to why) and had to be stopped.   Furosemide 60 mg IV x1.     Hyperglycemia despite insulin pump.   Insulin pump HELD.   Started SSI.     Alex Ramirez MD

## 2024-07-17 NOTE — CONSULTS
[facility] Pulmonary and Critical Care   Consult Note      Reason for Consult: Acute CVA/functioning peritoneal dialysis catheter  Requesting Physician: Jerson Blanchard    Subjective:   CHIEF COMPLAINT: Dysphasia and left sided hemiparesis     HPI: Patient originally presented with acute onset of speech difficulty and left-sided limb weakness.  He has also had on and off chest pain he received TNK, CT head/CTA head and neck showed no significant flow abnormalities or stenosis.  MRI brain was negative.  He currently denies any weakness and has normal speech.  However overnight he has had several episodes of chest tightness/pain associated with orthopnea.  Patient could not have overnight peritoneal dialysis catheter exchanges, with concerns for acute hyperkalemia.  Intensivist input has been requested.    Hospitalized between 6/23 and 7/4 for evaluation of chest pain, stress test consistent with scar.  Status post PCI to SVG on 5/1.    History of CAD status post CABG, ESRD on PD, type 2 diabetes mellitus, right carotid artery stenosis status post stent.  EF 15 to 20% on echo.  Diabetic peripheral neuropathy/Charcot's foot.       The patient is a 59 y.o. male with significant past medical history of:      Diagnosis Date    Angina at rest ([facility])     Cardiomyopathy ([facility])     Carotid artery stenosis 10/25/2016    SUZANNA stented with 8 x 30 mm non-tapered Xact stent    Charcot foot due to diabetes mellitus ([facility]) 07/01/2024    CHF (congestive heart failure) ([facility]) 03/03/2015    EF 30%     CKD (chronic kidney disease) stage 2, GFR 60-89 ml/min     Coronary artery disease     sp CABG UC    Coronary artery disease involving native heart with angina pectoris ([facility]) 05/26/2010    Formatting of this note might be different from the original.   s/p CABG      Last Assessment & Plan:    Formatting of this note might be different from the original.   s/p PTCA and stent during 04/24 hosptialization.        Cardiology (Dr. Celestin)  has told him  coronary bypass graft performed by David Parish MD at [facility] CARDIAC CATH LAB    CARDIAC SURGERY  2001    triple bypass at     CAROTID STENT PLACEMENT Right     CORONARY ARTERY BYPASS GRAFT      FOOT DEBRIDEMENT Left 10/16/2023    LEFT FOOT DEBRIDEMENT INCISION AND DRAINAGE WITH BONE BIOPSY performed by David Lowe DPM at [facility] OR    FOOT DEBRIDEMENT Left 10/23/2023    LEFT FOOT INCISION AND DRAINAGE WITH DELAYED PRIMARY CLOSURE AND DERMAL GRAFT APPLICATION performed by David Lowe DPM at [facility] OR    HERNIA REPAIR      infant    LAPAROSCOPY INSERTION PERITONEAL CATHETER N/A 06/03/2020    LAPAROSCOPIC PLACEMENT OF PERITONEAL DIALYSIS  CATHETER performed by Tristen Palacios MD at [facility] OR    TONSILLECTOMY       Current Medications:    Current Facility-Administered Medications: insulin lispro (HUMALOG,ADMELOG) injection vial 0-4 Units, 0-4 Units, SubCUTAneous, Nightly  insulin lispro (HUMALOG,ADMELOG) injection vial 0-16 Units, 0-16 Units, SubCUTAneous, TID WC  ipratropium 0.5 mg-albuterol 2.5 mg (DUONEB) nebulizer solution 1 Dose, 1 Dose, Inhalation, Q4H PRN  dianeal lo-miller 2.5% 2,000 mL with heparin (porcine) 250 Units solution, , IntraPERitoneal, Q3H  sodium chloride flush 0.9 % injection 5-40 mL, 5-40 mL, IntraVENous, 2 times per day  0.9 % sodium chloride infusion, , IntraVENous, PRN  sodium chloride flush 0.9 % injection 5-40 mL, 5-40 mL, IntraVENous, 2 times per day  sodium chloride flush 0.9 % injection 5-40 mL, 5-40 mL, IntraVENous, PRN  ondansetron (ZOFRAN-ODT) disintegrating tablet 4 mg, 4 mg, Oral, Q8H PRN **OR** ondansetron (ZOFRAN) injection 4 mg, 4 mg, IntraVENous, Q6H PRN  polyethylene glycol (GLYCOLAX) packet 17 g, 17 g, Oral, Daily PRN  [Held by provider] aspirin chewable tablet 81 mg, 81 mg, Oral, Daily **OR** [Held by provider] aspirin suppository 300 mg, 300 mg, Rectal, Daily  hydrALAZINE (APRESOLINE) tablet 50 mg, 50 mg, Oral, TID  [Held by provider] isosorbide

## 2024-07-17 NOTE — PROGRESS NOTES
[facility]   Nephrology progress Note  [phone]  [phone]   [employer]    Reason for Consult:  ESRD on PD   The patient is a 59 y.o. male with significant past medical history of type 1 diabetes diagnosed when he was 11-year-old, ESRD on peritoneal dialysis for the last 4 to 5 years, coronary artery disease s/p CABG, carotid artery stenosis s/p stent placement in the right ICA, left foot infection hypertension, hyperlipidemia, nephrolithiasis, presented to the hospital this time with having woken up with sudden onset of headache chest pain, in the ED he was evaluated and showed dysarthria and left lower lobe extremity weakness.  He was seen by Dr. Hannon of the stroke team., was given tenelctapase      Interval  History:  7/17/24: there were issues with peritoneal dialysis last night.  The machine kept alarming.  The staff could not troubleshoot it and dialysis was stopped.   This morning  morning and was short of breath the patient more short of breath and his potassium level at 808 16 was 6.4 with a creatinine of 7.8 and a bun of 54.  Blood sugar was elevated at 356 so it was felt that part of the rise in potassium could be due to the hypoglycemia.  The hyperglycemia was treated and the repeat potassium was 5.5 at 10:54 AM  Patient is constipated    PHYSICAL EXAM:    Vitals:    /72   Pulse (!) 101   Temp 97 °F (36.1 °C) (Temporal)   Resp 23   Ht 1.727 m (5' 8\")   Wt 88.5 kg (195 lb 1.7 oz)   SpO2 98%   BMI 29.67 kg/m²   I/O last 3 completed shifts:  In: 120 [P.O.:120]  Out: 1075 [Urine:1075]  I/O this shift:  In: 10 [I.V.:10]  Out: -     Physical Exam:  Gen:  alert, oriented x 3  PERRL , EOM +  Pallor +, No icterus  JVP not raised   CV: RRR no murmur or rub .Mid sternal scar of CABG   Lungs:B/ L air entry, Normal breath sounds , bibasilar crackles   Abd: soft, bowel sounds + , No organomegaly . PD catheter  Ext: No edema, no cyanosis  Skin: Warm.  No rash  Neuro: No

## 2024-07-17 NOTE — PROGRESS NOTES
Facility/Department: [facility] ICU  Speech Language Pathology   Dysphagia Treatment Note    Patient: Te Garay   : 1965   MRN: [id number]      Evaluation Date: 2024      Admitting Dx: Acute ischemic stroke (HCC) [I63.9]  Acute CVA (cerebrovascular accident) (HCC) [I63.9]  Chest pain, unspecified type [R07.9]  Cerebrovascular accident (CVA) due to embolism of anterior cerebral artery, unspecified blood vessel laterality (HCC) [I63.429]  Treatment Diagnosis: Oropharyngeal Dysphagia   Pain: Denies          MRI:   IMPRESSION:  No acute cortical infarct or other acute intracranial process.    Subjective: Pt much more alert and verbally responsive this date. Pt and family report improved speech language and cognitive function this date which is consistent with his baseline function.                                       Diet and Treatment Recommendations 2024:  Diet Solids Recommendation:  Regular texture diet  Liquid Consistency Recommendation:  Thin liquids  Recommended form of Meds: Meds whole with water        Compensatory strategies:   Upright as possible with all PO intake , Small bites/sips , Eat/feed slowly    Assessment of Texture Tolerance:  Diet level prior to treatment: Regular texture diet , Thin liquids   Tolerance of Current Diet Level: Pt experienced increased fluid overload with respiratory compromise overnight requiring change to NPO status and BiPAP overnight and earlier this date.     Impressions: Pt was positioned Upright in bed , awake and alert. Currently on  2L O2 via nasal cannula . Trials of thin liquids and regular solids  were provided to assess swallow function.Adequate bolus control, mastication and A-P propulsion noted with all textures. Mild delayed swallow initiation and intermittent delayed pharyngeal clearing noted with all textures. No overt signs of aspiration noted with any texture. However, delayed throat clear and delayed wet cough noted 2x following po trials.  Pt  demonstrates increased risk for aspiration due to co morbidities , deconditioning , and respiratory status . Based on today's assessment recommend Regular texture diet  with Thin liquids , Meds whole with water.     Dysphagia Goals:   Pt will functionally tolerate recommended diet with no overt clinical s/s of aspiration (Ongoing 07/17/24)    Plan:   1-2 times to ensure diet tolerance.    Patient/Family Education:  Provided education regarding role of SLP, recommendations and general speech pathology plan of care.   [x] Pt verbalized understanding and agreement   [] Pt requires ongoing learning   [] No evidence of comprehension     Discharge Recommendations:    Discharge recommendations to be determined pending ongoing follow-up during acute care stay    Treatment time:  Timed Code Treatment Minutes: 0 min  Total Treatment time:10 min    If patient discharges prior to next session this note will serve as a discharge summary.     Randi Ambrose [facility]-SLP#[id number]

## 2024-07-17 NOTE — PROGRESS NOTES
Black male admitted from ER with CVA  A/Ox4, follows commands.  Left sided weakness noted.  NIHSS score 5   VSS  Monitor NSR/ST  Lungs clear  Abd rounded, distended with hypoactive BSx4   PD catheter noted to RLQ.  Pt's own insulin pump infusing.  Pt c/o left sided chest pain 6/10 as Sharp stabbing  \"it's feels like someone is stabbing me with a knife & twisting it as they're pulling it out\"  Pain at times radiates up neck to jaw, up to left shoulder around to back of shoulder.  Also c/o HA 6/10.  Small diabetic wound noted to left lateral foot.  See wound assessment.  Pt repositioned.

## 2024-07-17 NOTE — SIGNIFICANT EVENT
Pt went into respiratory failure with hypoxia (sats down to 50s)  while coming  back from CT scan. Suspect volume overload in context of noncompliance with peritoneal dialysis.   Ddx would include adverse reaction to CT contrast - but not hypotension, apparent rash, nausea or vomiting, no perioral swelling apparent.     He was on nonrebreather with rales / rhonchi on exam, diminished breath sounds  - and inadequate improvement to oxygenation so nebs ordered along with BIPAP.   Another CXR ordered stat and pending.   No blood gas due to TNK treatment.     With BIPAP and neb his o2 sat his improved to 100% but he remains drowsy, sometimes attempting to remove bipap, but redirected by bedside RNs. He is arousable and able to  follow commands.     Dialysis RN at bedside and will  be working on resuming peritoneal dialysis.   CTA read pending.     Given contrast exposure will likely need to delay non-contrast head CT planned for 0500 on 7/17/24. Continue neuro checks as able.     Facing challenge of trying to control his chest pain while not giving so much opiate as to affect mental status (also he received diphenhydramine prior to contrast).       Alex Ramirez MD

## 2024-07-17 NOTE — PROGRESS NOTES
Treatment initiated without complications or complaints from patient. Patient resting comfortably with VSS upon dialysis RN exit from room. Alba Grossman RN notified of start of treatment and hotline number located on top of machine for any questions about troubleshooting during after hours.

## 2024-07-17 NOTE — CARE COORDINATION
and I cannot give a steroid injection.  Review of the UpToDate does state that immobility does    Diabetic peripheral neuropathy ([facility])     Diabetic polyneuropathy associated with type 1 diabetes mellitus ([facility]) 07/01/2024    Diastolic HF (heart failure) ([facility])     Essential hypertension 01/12/2019    Formatting of this note might be different from the original.   Fairly controlled      Last Assessment & Plan:    Formatting of this note might be different from the original.   Blood pressure is controlled.    History of COVID-19 04/16/2024    Hyperlipidemia 05/26/2010    Hyperlipidemia with target LDL less than 70     Hyperparathyroidism due to renal insufficiency ([facility]) 04/25/2024    Hypertension goal BP (blood pressure) < 130/80     Peritonitis associated with peritoneal dialysis ([facility]) 12/07/2021    PVD (peripheral vascular disease) ([facility])     S/P CABG (coronary artery bypass graft) 05/24/2018    Seizure disorder ([facility])     Seizures ([facility])     in childhood    Stenosis of carotid artery     Toe osteomyelitis, left ([facility]) 07/01/2024    Type 1 diabetes mellitus with chronic kidney disease ([facility])     c-peptide <0.1 in 2015       PAST SURGICAL HISTORY    Past Surgical History:   Procedure Laterality Date    BLADDER SURGERY Left 08/24/2023    CYSTOSCOPY, LEFT URETEROSCOPY, STONE BASKET MANIPULATION, PLACEMENT OF LEFT URETERAL STENT performed by Chevy Sepulveda MD at [facility] OR    CARDIAC CATHETERIZATION      CARDIAC PROCEDURE N/A 6/27/2024    Left and right heart w coronary bypass graft performed by David Parish MD at [facility] CARDIAC CATH LAB    CARDIAC SURGERY  2001    triple bypass at     CAROTID STENT PLACEMENT Right     CORONARY ARTERY BYPASS GRAFT      FOOT DEBRIDEMENT Left 10/16/2023    LEFT FOOT DEBRIDEMENT INCISION AND DRAINAGE WITH BONE BIOPSY performed by David Lowe DPM at [facility] OR    FOOT DEBRIDEMENT Left 10/23/2023    LEFT FOOT INCISION AND DRAINAGE WITH DELAYED PRIMARY CLOSURE AND DERMAL GRAFT  dressing/dressing sponge, Roll gauze, Coban/self-adherent bandages  Left lateral foot wound. Swab with betadine, let dry, cover with dry dressing (folded 4 x 4 gauze), roll gauze, secure with coban.    Specialty Bed Required : No , patient on critical care bed  [] Low Air Loss   [] Pressure Redistribution  [] Fluid Immersion  [] Bariatric  [] Total Pressure Relief  [] Other:     Current Diet: ADULT DIET; Regular; 4 carb choices (60 gm/meal); Low Potassium (Less than 3000 mg/day)  Dietician consult:  Yes    Discharge Plan:  Placement for patient upon discharge: home with support    Patient appropriate for Outpatient Wound Care Center: No, patient sees Dr Lowe (podiatry)    Referrals:  []   [] Home Health Care  [] Supplies  [] Other    Patient/Caregiver Teaching:  Level of patient/caregiver understanding able to:   [x] Indicates understanding       [x] Needs reinforcement  [] Unsuccessful      [] Verbal Understanding  [] Demonstrated understanding       [] No evidence of learning  [] Refused teaching         [] N/A       Electronically signed by IRMA DORADO, RN, CWOCN  Inpatient  Wound/Ostomy Care  [phone] on 7/17/2024 at 3:22 PM

## 2024-07-17 NOTE — CONSULTS
[facility]   CONSULTATION  [phone]      Chief Complaint   Patient presents with    Headache     PT arrived [place] [facility] from home pt receives dialysis at home when he suddenly began to act abnormal according to the wife. Pt states he has left foot pain and a severe headache and feels dehydrated.          History of Present Illness:  Te Garay is a 59 y.o. patient well-known to me.  He was admitted with altered neurologic status but not diagnosed with definitive stroke.  He did receive TNK.  Neurologic deficits have improved and are close to baseline again.  He has known CAD s/p CABG with known ischemic cardiomyopathy.  He does have significant stenosis in his SVG-OM.  He is currently undergoing evaluation for kidney transplant candidacy.  During the current admission he had developed worsening chest discomfort which is persistent for 2 to 3 days.  Ultimately he, along with medical staff, figured out that it was likely fluid retention due to missed (1) or incomplete peritoneal dialysis sessions due to dialysis catheter dysfunction.  He is now feeling much better.  He also recalls having chest discomfort since, similar, during times of volume overload.  Serial echoes obtained suggest reduction in the most recent LV systolic function in comparison to the prior.    Past Medical History:   has a past medical history of Angina at rest ([facility]), Cardiomyopathy ([facility]), Carotid artery stenosis, Charcot foot due to diabetes mellitus ([facility]), CHF (congestive heart failure) ([facility]), CKD (chronic kidney disease) stage 2, GFR 60-89 ml/min, Coronary artery disease, Coronary artery disease involving native heart with angina pectoris ([facility]), De Quervain thyroiditis, Diabetic peripheral neuropathy ([facility]), Diabetic polyneuropathy associated with type 1 diabetes mellitus ([facility]), Diastolic HF (heart failure) ([facility]), Essential hypertension, History of COVID-19, Hyperlipidemia, Hyperlipidemia with target LDL less than 70,  touchdown     LIMA-LAD widely patent        RECOMMENDATION:    - Extensive discussion with family, recommend aggressive medical management, vessel is high risk for restenosis and elevated risk for PCI - would require laser atherectomy      Cardiac cath: 4/29/24       FINDINGS   Artery Findings   % distal   % ostial   L-LAD patent   S-OM Patent ostial stent, 90% mid   Cx 100% prox   RCA Nondominant, small,  Nondominant   LVEDP NA Normal 3-12mmHg   LVG NA Normal >/= 55%   AVG N/A      INTERVENTION(S)      Artery Location Intervention ABEL-Pre ABEL-Post Residual   SVG mid [employer] 3.0X48 3 3 None            POST CATH  RECOMMENDATIONS   General Continued aggressive medical therapy and risk factor modification   Medication DAPT for >/= 6 months and ASA 81 indefinitely   Consultation None   Procedural None              MEDICATION RECOMMENDATIONS   Recommendation Rx Detail Reason Not Prescribed   ASA ASA 81mg PO QD     P2Y12 Plavix 75mg PO QDAY     HI-STATIN None Allergy   BETA BLOCKER Carvedilol      ACE/ARB/ARNI Entresto     ALDACTONE None Low BP      Cardiac cath 4/9/21:   Procedure:          LHC, PCI of SVG  Indication:          NSTEMI     Procedure Details:  Consent Access Bleed R Sedat Start Stop Versed Fentanyl Contrast Fluoro EBL Comp Spec   Yes RCFA high MCSFC 1456 1554 1.5 75 132 13.4 <20 None None   *Ultrasound Note: Ultrasound guidance used to determine aforementioned artery patency, size (>2mm), anatomic variations and ideal puncture location.  Real-time ultrasound utilized concurrent with vascular needle entry into the artery.  Image(s) permanently recorded and reported in the patient chart.  *Sedation Note: MCSFC = minimal conscious sedation for comfort     Findings:  Artery Findings/Result   LM Normal   % ostial   Cx 100% mid   % ostial   RCA Nondominant, Smaller RV marginal with mid 70% and distal 90% lesions supplying collateral to distal Cx (old finding), small but could

## 2024-07-17 NOTE — RT PROTOCOL NOTE
RT Inhaler-Nebulizer Bronchodilator Protocol Note    There is a bronchodilator order in the chart from a provider indicating to follow the RT Bronchodilator Protocol and there is an “Initiate RT Inhaler-Nebulizer Bronchodilator Protocol” order as well (see protocol at bottom of note).    CXR Findings:  XR CHEST PORTABLE    Result Date: 7/16/2024  Cardiomegaly with diffuse increased interstitial opacities, worsened compared to prior exam, probably interstitial edema.     XR CHEST PORTABLE    Result Date: 7/16/2024  1. No acute cardiopulmonary process. 2. Status post CABG.     XR CHEST PORTABLE    Result Date: 7/16/2024  Silhouetting of the left heart border may be due to atelectasis or pneumonia.       The findings from the last RT Protocol Assessment were as follows:   History Pulmonary Disease: None or smoker <15 pack years  Respiratory Pattern: Regular pattern and RR 12-20 bpm  Breath Sounds: Slightly diminished and/or crackles  Cough: Strong, spontaneous, non-productive  Indication for Bronchodilator Therapy:    Bronchodilator Assessment Score: 2    Aerosolized bronchodilator medication orders have been revised according to the RT Inhaler-Nebulizer Bronchodilator Protocol below.    Respiratory Therapist to perform RT Therapy Protocol Assessment initially then follow the protocol.  Repeat RT Therapy Protocol Assessment PRN for score 0-3 or on second treatment, BID, and PRN for scores above 3.    No Indications - adjust the frequency to every 6 hours PRN wheezing or bronchospasm, if no treatments needed after 48 hours then discontinue using Per Protocol order mode.     If indication present, adjust the RT bronchodilator orders based on the Bronchodilator Assessment Score as indicated below.  Use Inhaler orders unless patient has one or more of the following: on home nebulizer, not able to hold breath for 10 seconds, is not alert and oriented, cannot activate and use MDI correctly, or respiratory rate 25 breaths per  minute or more, then use the equivalent nebulizer order(s) with same Frequency and PRN reasons based on the score.  If a patient is on this medication at home then do not decrease Frequency below that used at home.    0-3 - enter or revise RT bronchodilator order(s) to equivalent RT Bronchodilator order with Frequency of every 4 hours PRN for wheezing or increased work of breathing using Per Protocol order mode.        4-6 - enter or revise RT Bronchodilator order(s) to two equivalent RT bronchodilator orders with one order with BID Frequency and one order with Frequency of every 4 hours PRN wheezing or increased work of breathing using Per Protocol order mode.        7-10 - enter or revise RT Bronchodilator order(s) to two equivalent RT bronchodilator orders with one order with TID Frequency and one order with Frequency of every 4 hours PRN wheezing or increased work of breathing using Per Protocol order mode.       11-13 - enter or revise RT Bronchodilator order(s) to one equivalent RT bronchodilator order with QID Frequency and an Albuterol order with Frequency of every 4 hours PRN wheezing or increased work of breathing using Per Protocol order mode.      Greater than 13 - enter or revise RT Bronchodilator order(s) to one equivalent RT bronchodilator order with every 4 hours Frequency and an Albuterol order with Frequency of every 2 hours PRN wheezing or increased work of breathing using Per Protocol order mode.     RT to enter RT Home Evaluation for COPD & MDI Assessment order using Per Protocol order mode.    Electronically signed by Clementina Miller RCP on 7/17/2024 at 12:15 AM

## 2024-07-17 NOTE — SIGNIFICANT EVENT
Called to bedside as patient with severe chest pain that he characterizes as a circular pressure in his chest that says feels like angina. Conversely - he also reports feeling as though he is not having a heart attack.     He received an antacid without benefit.     In the context of his severe chest pain ICU RN reported that he lost consciousness for about a minute.   He reports his chest pain radiates to his back.     His extremities remain warm but he is tachycardic with a BP of 130s/100s.     EKG repeated and showed widened QRS which is new from this morning (qrs 118 to 136). I suspect a mild ST abnormality in the lateral leads as well.     CXR obtained and showed interstitial edema.     He received tenectplase at 0500 this morning so not a great candidate for labs to check troponin.     Ddx with narrowed pulse pressure and loss of consciousness would include worsened systolic function or pericardial effusion, also concerning would be aortic dissection given his description of his chest pain.     Stat cardiology consult - discussed - and in agreement for stat echo despite having already had an echo today.     Te was given nitro sublingual and his chest pain improved from 10/10 to about 4/10. Morphine  dose repeat also ordered.   Continue telemetry.   Cardiology recommended addition of nitropaste which has been ordered.     Updated nephrology as to his status - if echo is positive will need further help from cardiology or maybe even ct surg. If echo unchanged  from prior would plan for CT chest angio to rule out dissection.     Meanwhile serial EKGs overnight planned.     Family updated at bedside.     Alex Ramirez MD

## 2024-07-18 ENCOUNTER — APPOINTMENT (OUTPATIENT)
Dept: GENERAL RADIOLOGY | Age: 59
DRG: 061 | End: 2024-07-18
Payer: MEDICARE

## 2024-07-18 LAB
ANION GAP SERPL CALCULATED.3IONS-SCNC: 17 MMOL/L (ref 3–16)
BUN SERPL-MCNC: 60 MG/DL (ref 7–20)
CALCIUM SERPL-MCNC: 8.4 MG/DL (ref 8.3–10.6)
CHLORIDE SERPL-SCNC: 91 MMOL/L (ref 99–110)
CO2 SERPL-SCNC: 21 MMOL/L (ref 21–32)
CREAT SERPL-MCNC: 8.4 MG/DL (ref 0.9–1.3)
DEPRECATED RDW RBC AUTO: 15 % (ref 12.4–15.4)
GFR SERPLBLD CREATININE-BSD FMLA CKD-EPI: 7 ML/MIN/{1.73_M2}
GLUCOSE BLD-MCNC: 193 MG/DL (ref 70–99)
GLUCOSE BLD-MCNC: 226 MG/DL (ref 70–99)
GLUCOSE BLD-MCNC: 231 MG/DL (ref 70–99)
GLUCOSE BLD-MCNC: 310 MG/DL (ref 70–99)
GLUCOSE BLD-MCNC: 425 MG/DL (ref 70–99)
GLUCOSE BLD-MCNC: 432 MG/DL (ref 70–99)
GLUCOSE SERPL-MCNC: 451 MG/DL (ref 70–99)
HCT VFR BLD AUTO: 33.7 % (ref 40.5–52.5)
HGB BLD-MCNC: 10.7 G/DL (ref 13.5–17.5)
MCH RBC QN AUTO: 29.3 PG (ref 26–34)
MCHC RBC AUTO-ENTMCNC: 31.8 G/DL (ref 31–36)
MCV RBC AUTO: 92.3 FL (ref 80–100)
PERFORMED ON: ABNORMAL
PLATELET # BLD AUTO: 167 K/UL (ref 135–450)
PMV BLD AUTO: 10.1 FL (ref 5–10.5)
POTASSIUM SERPL-SCNC: 5.1 MMOL/L (ref 3.5–5.1)
RBC # BLD AUTO: 3.65 M/UL (ref 4.2–5.9)
SODIUM SERPL-SCNC: 129 MMOL/L (ref 136–145)
WBC # BLD AUTO: 8.8 K/UL (ref 4–11)

## 2024-07-18 PROCEDURE — 36415 COLL VENOUS BLD VENIPUNCTURE: CPT

## 2024-07-18 PROCEDURE — 6360000002 HC RX W HCPCS: Performed by: INTERNAL MEDICINE

## 2024-07-18 PROCEDURE — 92526 ORAL FUNCTION THERAPY: CPT

## 2024-07-18 PROCEDURE — 6370000000 HC RX 637 (ALT 250 FOR IP): Performed by: HOSPITALIST

## 2024-07-18 PROCEDURE — 2580000003 HC RX 258: Performed by: INTERNAL MEDICINE

## 2024-07-18 PROCEDURE — 6360000002 HC RX W HCPCS: Performed by: PEDIATRICS

## 2024-07-18 PROCEDURE — 2580000003 HC RX 258: Performed by: HOSPITALIST

## 2024-07-18 PROCEDURE — 6370000000 HC RX 637 (ALT 250 FOR IP)

## 2024-07-18 PROCEDURE — 6360000002 HC RX W HCPCS: Performed by: STUDENT IN AN ORGANIZED HEALTH CARE EDUCATION/TRAINING PROGRAM

## 2024-07-18 PROCEDURE — 80048 BASIC METABOLIC PNL TOTAL CA: CPT

## 2024-07-18 PROCEDURE — 90935 HEMODIALYSIS ONE EVALUATION: CPT

## 2024-07-18 PROCEDURE — 6370000000 HC RX 637 (ALT 250 FOR IP): Performed by: STUDENT IN AN ORGANIZED HEALTH CARE EDUCATION/TRAINING PROGRAM

## 2024-07-18 PROCEDURE — APPNB30 APP NON BILLABLE TIME 0-30 MINS

## 2024-07-18 PROCEDURE — 71045 X-RAY EXAM CHEST 1 VIEW: CPT

## 2024-07-18 PROCEDURE — 90945 DIALYSIS ONE EVALUATION: CPT

## 2024-07-18 PROCEDURE — 51702 INSERT TEMP BLADDER CATH: CPT

## 2024-07-18 PROCEDURE — 99233 SBSQ HOSP IP/OBS HIGH 50: CPT | Performed by: INTERNAL MEDICINE

## 2024-07-18 PROCEDURE — 2060000000 HC ICU INTERMEDIATE R&B

## 2024-07-18 PROCEDURE — APPSS30 APP SPLIT SHARED TIME 16-30 MINUTES

## 2024-07-18 PROCEDURE — 85027 COMPLETE CBC AUTOMATED: CPT

## 2024-07-18 PROCEDURE — 94660 CPAP INITIATION&MGMT: CPT

## 2024-07-18 RX ORDER — SODIUM CHLORIDE, SODIUM LACTATE, CALCIUM CHLORIDE, MAGNESIUM CHLORIDE AND DEXTROSE 4.25; 538; 448; 18.3; 5.08 G/100ML; MG/100ML; MG/100ML; MG/100ML; MG/100ML
2000 INJECTION, SOLUTION INTRAPERITONEAL EVERY 4 HOURS
Status: COMPLETED | OUTPATIENT
Start: 2024-07-18 | End: 2024-07-18

## 2024-07-18 RX ORDER — FUROSEMIDE 10 MG/ML
80 INJECTION INTRAMUSCULAR; INTRAVENOUS ONCE
Status: COMPLETED | OUTPATIENT
Start: 2024-07-18 | End: 2024-07-18

## 2024-07-18 RX ORDER — LEVETIRACETAM 250 MG/1
500 TABLET ORAL DAILY
Status: DISCONTINUED | OUTPATIENT
Start: 2024-07-18 | End: 2024-07-22 | Stop reason: HOSPADM

## 2024-07-18 RX ORDER — INSULIN LISPRO 100 [IU]/ML
10 INJECTION, SOLUTION INTRAVENOUS; SUBCUTANEOUS ONCE
Status: COMPLETED | OUTPATIENT
Start: 2024-07-18 | End: 2024-07-18

## 2024-07-18 RX ORDER — INSULIN GLARGINE 100 [IU]/ML
8 INJECTION, SOLUTION SUBCUTANEOUS DAILY
Status: DISCONTINUED | OUTPATIENT
Start: 2024-07-18 | End: 2024-07-22

## 2024-07-18 RX ORDER — SODIUM CHLORIDE, SODIUM LACTATE, CALCIUM CHLORIDE, MAGNESIUM CHLORIDE AND DEXTROSE 4.25; 538; 448; 18.3; 5.08 G/100ML; MG/100ML; MG/100ML; MG/100ML; MG/100ML
2000 INJECTION, SOLUTION INTRAPERITONEAL EVERY 4 HOURS
Status: DISCONTINUED | OUTPATIENT
Start: 2024-07-18 | End: 2024-07-22

## 2024-07-18 RX ORDER — ISOSORBIDE MONONITRATE 30 MG/1
30 TABLET, EXTENDED RELEASE ORAL NIGHTLY
Status: DISCONTINUED | OUTPATIENT
Start: 2024-07-18 | End: 2024-07-22 | Stop reason: HOSPADM

## 2024-07-18 RX ORDER — ISOSORBIDE MONONITRATE 60 MG/1
60 TABLET, EXTENDED RELEASE ORAL DAILY
Status: DISCONTINUED | OUTPATIENT
Start: 2024-07-19 | End: 2024-07-22 | Stop reason: HOSPADM

## 2024-07-18 RX ORDER — FUROSEMIDE 10 MG/ML
80 INJECTION INTRAMUSCULAR; INTRAVENOUS 2 TIMES DAILY
Status: DISCONTINUED | OUTPATIENT
Start: 2024-07-18 | End: 2024-07-19

## 2024-07-18 RX ADMIN — RANOLAZINE 500 MG: 500 TABLET, EXTENDED RELEASE ORAL at 08:25

## 2024-07-18 RX ADMIN — HYDRALAZINE HYDROCHLORIDE 50 MG: 25 TABLET ORAL at 08:25

## 2024-07-18 RX ADMIN — ROSUVASTATIN CALCIUM 40 MG: 40 TABLET, COATED ORAL at 18:52

## 2024-07-18 RX ADMIN — ISOSORBIDE MONONITRATE 30 MG: 30 TABLET, EXTENDED RELEASE ORAL at 08:33

## 2024-07-18 RX ADMIN — LEVETIRACETAM 500 MG: 250 TABLET, FILM COATED ORAL at 12:53

## 2024-07-18 RX ADMIN — ISOSORBIDE MONONITRATE 30 MG: 30 TABLET, EXTENDED RELEASE ORAL at 21:00

## 2024-07-18 RX ADMIN — SODIUM CHLORIDE, SODIUM LACTATE, CALCIUM CHLORIDE, MAGNESIUM CHLORIDE AND DEXTROSE 2000 ML: 4.25; 538; 448; 18.3; 5.08 INJECTION, SOLUTION INTRAPERITONEAL at 17:00

## 2024-07-18 RX ADMIN — ISOSORBIDE MONONITRATE 90 MG: 30 TABLET, EXTENDED RELEASE ORAL at 08:26

## 2024-07-18 RX ADMIN — SODIUM CHLORIDE, SODIUM LACTATE, CALCIUM CHLORIDE, MAGNESIUM CHLORIDE AND DEXTROSE 2000 ML: 4.25; 538; 448; 18.3; 5.08 INJECTION, SOLUTION INTRAPERITONEAL at 22:13

## 2024-07-18 RX ADMIN — SODIUM CHLORIDE, SODIUM LACTATE, CALCIUM CHLORIDE, MAGNESIUM CHLORIDE AND DEXTROSE 2000 ML: 4.25; 538; 448; 18.3; 5.08 INJECTION, SOLUTION INTRAPERITONEAL at 12:45

## 2024-07-18 RX ADMIN — INSULIN LISPRO 10 UNITS: 100 INJECTION, SOLUTION INTRAVENOUS; SUBCUTANEOUS at 08:32

## 2024-07-18 RX ADMIN — SODIUM CHLORIDE, PRESERVATIVE FREE 10 ML: 5 INJECTION INTRAVENOUS at 20:00

## 2024-07-18 RX ADMIN — FUROSEMIDE 80 MG: 10 INJECTION, SOLUTION INTRAMUSCULAR; INTRAVENOUS at 10:57

## 2024-07-18 RX ADMIN — INSULIN LISPRO 16 UNITS: 100 INJECTION, SOLUTION INTRAVENOUS; SUBCUTANEOUS at 06:38

## 2024-07-18 RX ADMIN — RANOLAZINE 500 MG: 500 TABLET, EXTENDED RELEASE ORAL at 21:00

## 2024-07-18 RX ADMIN — HYDRALAZINE HYDROCHLORIDE 50 MG: 25 TABLET ORAL at 21:00

## 2024-07-18 RX ADMIN — SACUBITRIL AND VALSARTAN 1 TABLET: 49; 51 TABLET, FILM COATED ORAL at 21:00

## 2024-07-18 RX ADMIN — HYDRALAZINE HYDROCHLORIDE 50 MG: 25 TABLET ORAL at 16:23

## 2024-07-18 RX ADMIN — SODIUM CHLORIDE, PRESERVATIVE FREE 10 ML: 5 INJECTION INTRAVENOUS at 22:00

## 2024-07-18 RX ADMIN — INSULIN GLARGINE 8 UNITS: 100 INJECTION, SOLUTION SUBCUTANEOUS at 08:32

## 2024-07-18 RX ADMIN — SACUBITRIL AND VALSARTAN 1 TABLET: 49; 51 TABLET, FILM COATED ORAL at 08:25

## 2024-07-18 RX ADMIN — MORPHINE SULFATE 2 MG: 2 INJECTION, SOLUTION INTRAMUSCULAR; INTRAVENOUS at 04:08

## 2024-07-18 RX ADMIN — PANTOPRAZOLE SODIUM 40 MG: 40 INJECTION, POWDER, FOR SOLUTION INTRAVENOUS at 08:33

## 2024-07-18 RX ADMIN — FUROSEMIDE 80 MG: 10 INJECTION, SOLUTION INTRAMUSCULAR; INTRAVENOUS at 16:23

## 2024-07-18 ASSESSMENT — PAIN SCALES - GENERAL
PAINLEVEL_OUTOF10: 4
PAINLEVEL_OUTOF10: 6
PAINLEVEL_OUTOF10: 2

## 2024-07-18 ASSESSMENT — PAIN DESCRIPTION - ORIENTATION: ORIENTATION: LEFT;MID

## 2024-07-18 ASSESSMENT — PAIN DESCRIPTION - DESCRIPTORS: DESCRIPTORS: SHARP

## 2024-07-18 ASSESSMENT — PAIN - FUNCTIONAL ASSESSMENT: PAIN_FUNCTIONAL_ASSESSMENT: ACTIVITIES ARE NOT PREVENTED

## 2024-07-18 ASSESSMENT — PAIN DESCRIPTION - LOCATION: LOCATION: CHEST

## 2024-07-18 NOTE — PROGRESS NOTES
Te Garay  Neurology Follow-up  [facility] Neurology    Date of Service: 7/18/2024    Subjective:   CC: Follow up today regarding: Acute speech disturbance, left-sided weakness, possible new stroke, status post TNK    Events noted. Chart and lab reviewed.  Patient seen this morning he is resting in bed.  Asked by nurse on behalf of family to review MRI results.  We discussed MRI results which showed no acute stroke.  He is was able to get his dialysis done overnight.  He has been hyperglycemic this morning.  Blood pressure elevated, systolic 160s.  He was able to walk yesterday with a walker.  Discussed with his wife apparently patient has not been taking his Keppra at home for unclear reason.  Today he reports generalized fatigue.  He denies headache, dizziness, dysarthria or dysphagia.  Other review systems unremarkable      ROS : A 10-12 system review obtained and updated today and is unremarkable except as mentioned  in my interval history.     Past medical history, social history, medication and family history reviewed.       Objective:  Exam:   Constitutional:   Vitals:    07/18/24 0600 07/18/24 0628 07/18/24 0700 07/18/24 0820   BP: (!) 153/88  (!) 163/118 136/65   Pulse: (!) 104  (!) 108 (!) 105   Resp: 20  20 20   Temp:    97.7 °F (36.5 °C)   TempSrc:    Temporal   SpO2: 95%  93% 94%   Weight:  89.4 kg (197 lb 1.5 oz)     Height:         General appearance: Fatigued, but does not appear in acute distress.   Mental Status:   Oriented to person, place, problem  Memory: Good immediate recall.  Intact remote memory  Good attention span and concentration.  Language: intact naming, repeating and fluency   Good fund of Knowledge.   Cranial Nerves:   II:   Pupils: equal, round, reactive to light  III,IV,VI: Extra Ocular Movements are intact. No nystagmus  V: Facial sensation is intact  VII: Facial strength and movements: intact and symmetric  XII: Tongue movements are normal  Musculoskeletal: 5/5 bilateral

## 2024-07-18 NOTE — FLOWSHEET NOTE
07/18/24 1945   Vital Signs   BP (!) 151/92   Temp 97.2 °F (36.2 °C)   Pulse (!) 108   Respirations 20     C/o severe abd pain 15 minutes into 1st dwell.  Bladder scan done, revealed 513 ml.  Dr. Pryor notified. Continued to c/o severe abd pain, STAT drain was done per cycler with 5728 ml drained.  Real cath inserted by Yenifer Orellana RN with 175 ml clear yellow returned.  Placed on BiPaP @ 40%, I-Time 1:00, IPAP 12, EPAP 5, rate 12, rise 3.  O2 sat 97%.      Per Dr. Pryor order ICU RN to perform manual exchanges q 4 hours using 4.25 % Dianeal.      STAT drain volume 5728 ml , time 00:43, UF volume 3229 ml. Relief of abd pain after drain.    Total time 1:33  Total UF 3284 ml  Total Volume 2498 ml  Dwell time lost 08:44.

## 2024-07-18 NOTE — PROGRESS NOTES
Te Garay  7/18/2024  [id number]    Chief Complaint: Acute CVA (cerebrovascular accident) (HCC)    Subjective   Since last seen, medical updates:  - Nephrology following for dialysis needs. Having issues with peritoneal dialysis, so requiring manual exchanges for now.   - Cardiology consulted for chest pain. Felt to be mostly related to cardiac stress in the setting of volume overload. However, concern for graft dysfunction remains. Planning for PCI to SVG-OM graft when more medically stable.   - Having issues with hyperglycemia despite insulin pump, currently receiving Lantus plus SSI.    Patient reports that he is doing well. Left leg strength is improving, no longer buckling like it once was. He understands MRI did not show evidence of a new stroke.          Objective   Objective:  Patient Vitals for the past 24 hrs:   BP Temp Temp src Pulse Resp SpO2 Weight   07/18/24 0820 136/65 97.7 °F (36.5 °C) Temporal (!) 105 20 94 % --   07/18/24 0700 (!) 163/118 -- -- (!) 108 20 93 % --   07/18/24 0628 -- -- -- -- -- -- 89.4 kg (197 lb 1.5 oz)   07/18/24 0600 (!) 153/88 -- -- (!) 104 20 95 % --   07/18/24 0500 (!) 132/90 -- -- (!) 104 22 99 % --   07/18/24 0424 -- -- -- (!) 104 19 94 % --   07/18/24 0400 129/88 97 °F (36.1 °C) Temporal 100 20 99 % --   07/18/24 0300 129/79 -- -- 98 16 100 % --   07/18/24 0200 130/76 -- -- 98 17 100 % --   07/18/24 0100 137/82 -- -- 98 22 100 % --   07/18/24 0027 -- -- -- (!) 102 20 99 % --   07/18/24 0000 (!) 149/86 97.4 °F (36.3 °C) Temporal (!) 104 22 95 % --   07/17/24 2300 (!) 155/74 97.7 °F (36.5 °C) Temporal (!) 102 20 97 % --   07/17/24 2200 139/75 -- -- 98 16 93 % --   07/17/24 2100 136/71 -- -- 94 -- -- --   07/17/24 2010 127/66 97.8 °F (36.6 °C) Temporal 96 16 -- --   07/17/24 2000 -- -- -- 96 -- 96 % --   07/17/24 1900 -- -- -- 100 -- -- --   07/17/24 1800 -- -- -- (!) 104 -- -- --   07/17/24 1700 -- -- -- (!) 111 -- -- --   07/17/24 1635 -- -- -- (!) 102 -- 98 % --    07/17/24 1606 (!) 107/46 -- -- -- -- -- --   07/17/24 1600 (!) 107/48 98 °F (36.7 °C) Temporal (!) 109 22 98 % --   07/17/24 1500 -- -- -- (!) 104 -- -- --   07/17/24 1400 -- -- -- 97 19 -- --     Gen: No distress, pleasant.   HEENT: Normocephalic, atraumatic.   CV: Extremities warm, perfused.  Resp: No respiratory distress. No increased WOB  Abd: Soft, nontender nondistended  Ext: No edema.   Neuro: Alert, oriented. 4/5 LLE.   Psych: Calm, pleasant.     Laboratory data: Available via EMR.     Therapy progress:    PT    Rolling: Level of difficulty - A Little   Sit to Stand from a Chair: Level of difficulty - A Little  Supine to Sit: Level of difficulty - A Little     Bed to Chair: Physical Assistance Required - A Little  Ambulate Across Room: Physical Assistance Required - A Little  Ascend 3-5 Steps With HR: Physical Assistance Required - A Little    OT    Eating: Physical Assistance Required - A Little  Grooming: Physical Assistance Required - A Little  LB Dressing: Physical Assistance Required - A Lot  UB Dressing: Physical Assistance Required - A Little  Bathing: Physical Assistance Required - A Lot  Toileting: Physical Assistance Required - A Lot    SLP         Body mass index is 29.97 kg/m².       Assessment:  Acute left sided weakness, s/p TNK: No evidence of new CVA. Possible TIA vs alternative etiology. Overall improving.     Impairments: Left leg weakness, debility limiting independence with functional mobility and ADLs.     Plan:   - Lacks specific rehabilitation diagnosis for inpatient rehab. If persistent debility following stability of acute medical issues, may qualify for additional rehab at SNF if unable to return home.   - PM&R to sign off for now. Please feel free to reach out if any significant change in patient's level of function.     Carlos Leone MD 7/18/2024, 1:07 PM    * This document was created using dictation software.  While all precautions were taken to ensure accuracy, errors may

## 2024-07-18 NOTE — PROGRESS NOTES
Findings were discussed with VERONICA Goldman by Paola Wallace on   07/16/2024 at 4:13 a.m.                 Assessment/Plan:    Active Hospital Problems    Diagnosis     Hyperkalemia [E87.5]      Priority: Medium    Acute pulmonary edema (HCC) [J81.0]     Cardiomyopathy (HCC) [I42.9]     Received intravenous tissue plasminogen activator (tPA) in emergency department [Z92.82]     Type 1 diabetes mellitus (HCC) [E10.9]     Myoclonus [G25.3]     Seizures (HCC) [R56.9]     ESRD (end stage renal disease) (HCC) [N18.6]     ESRD on peritoneal dialysis (HCC) [N18.6, Z99.2]     Acute CVA (cerebrovascular accident) (HCC) [I63.9]     Chronic combined systolic and diastolic heart failure (HCC) [I50.42]     HTN (hypertension), benign [I10]     Chest pain [R07.9]     PVD (peripheral vascular disease) (HCC) [I73.9]     HLD (hyperlipidemia) [E78.5]     Coronary artery disease due to lipid rich plaque [I25.10, I25.83]          Hospital Day: 3    This is a 59 y.o. male who presented to [facility] on 7/16/2024 and is being treated for:    CVA  -CT head nonacute  -CTA head and neck nonacute  -MRI head non acute  -TNK given at 0500 on 7/16  -CT head ordered for 0500 on 7/17 - non acute  -Echo 7/16/24 showed LVEF 15-20% with severe global hypokinesis  -Statin and aspirin  -Diet  -IVF  -Daily labs; replace electrolytes as needed  -TIA/CVA order set ordered  -Neurology consulted     Hypertension  -Continue home antihypertensives  -Continue home Imdur     ESRD on dialysis  -On peritoneal dialysis - there was an issue overnight completing treatment  -Patient will eventually need his PD catheter replaced  -Avoid nephrotoxins  -Nephrology consulted    Hyperkalemia  -Likely 2/2 no PD performed  -Insulin x1 given  -Trend labs    Hyperlipidemia  -Continue home statin    Type 1 diabetes  -Continue home insulin pump  -Lantus and humalog given today     Seizures  -Not taking Keppra at home, but will restart per neurology     HFrEF  -Not

## 2024-07-18 NOTE — PROGRESS NOTES
Facility/Department: [facility] ICU  Speech Language Pathology   Dysphagia Treatment Note    Patient: Te Garay   : 1965   MRN: [id number]      Evaluation Date: 2024      Admitting Dx: Acute ischemic stroke (HCC) [I63.9]  Acute CVA (cerebrovascular accident) (HCC) [I63.9]  Chest pain, unspecified type [R07.9]  Cerebrovascular accident (CVA) due to embolism of anterior cerebral artery, unspecified blood vessel laterality (HCC) [I63.429]  Treatment Diagnosis: Oropharyngeal Dysphagia   Pain: Denies                                Diet and Treatment Recommendations 2024:  Diet Solids Recommendation:  Regular texture diet  Liquid Consistency Recommendation:  Thin liquids  Recommended form of Meds: Meds whole with water        Compensatory strategies:   Upright as possible with all PO intake , Small bites/sips , Eat/feed slowly    Assessment of Texture Tolerance:  Diet level prior to treatment: Regular texture diet , Thin liquids   Tolerance of Current Diet Level:Per chart, no noted difficulty with current diet level     Impressions: Pt was positioned Upright in bed , awake and alert. Currently on  2L O2 via nasal cannula . Trials of thin liquids were provided to assess swallow function. Pt denied any difficulty with current diet level. Reports coughing throughout the day, does not get worse with eating/drinking. Trials of thin liquids revealed seemingly timely swallow initiation with no immediate overt clinical s/s of aspiration/penetration.  Pt demonstrates increased risk for aspiration due to co morbidities , deconditioning , and respiratory status  however, appears to be tolerating current diet. Based on today's assessment recommend Regular texture diet  with Thin liquids , Meds whole with water. No further follow-up appears indicated at this time. Please re-consult if indicated.     Dysphagia Goals:   Pt will functionally tolerate recommended diet with no overt clinical s/s of aspiration (GOAL  MET)    Plan:   No further follow-up indicated     Patient/Family Education:  Provided education regarding role of SLP, recommendations and general speech pathology plan of care.   [x] Pt verbalized understanding and agreement   [] Pt requires ongoing learning   [] No evidence of comprehension     Discharge Recommendations:    No further follow-up appears indicated at this time.     Treatment time:  Timed Code Treatment Minutes: 0 min  Total Treatment time:10 min    If patient discharges prior to next session this note will serve as a discharge summary.     Randi Ambrose [facility]-SLP#[id number]

## 2024-07-18 NOTE — PLAN OF CARE
Problem: Discharge Planning  Goal: Discharge to home or other facility with appropriate resources  Outcome: Progressing  Flowsheets (Taken 7/17/2024 2000)  Discharge to home or other facility with appropriate resources: Identify barriers to discharge with patient and caregiver     Problem: Pain  Goal: Verbalizes/displays adequate comfort level or baseline comfort level  Outcome: Progressing     Problem: Chronic Conditions and Co-morbidities  Goal: Patient's chronic conditions and co-morbidity symptoms are monitored and maintained or improved  Outcome: Progressing  Flowsheets (Taken 7/17/2024 2000)  Care Plan - Patient's Chronic Conditions and Co-Morbidity Symptoms are Monitored and Maintained or Improved: Monitor and assess patient's chronic conditions and comorbid symptoms for stability, deterioration, or improvement     Problem: Safety - Adult  Goal: Free from fall injury  Outcome: Progressing     Problem: ABCDS Injury Assessment  Goal: Absence of physical injury  Outcome: Progressing     Problem: Skin/Tissue Integrity  Goal: Absence of new skin breakdown  Description: 1.  Monitor for areas of redness and/or skin breakdown  2.  Assess vascular access sites hourly  3.  Every 4-6 hours minimum:  Change oxygen saturation probe site  4.  Every 4-6 hours:  If on nasal continuous positive airway pressure, respiratory therapy assess nares and determine need for appliance change or resting period.  Outcome: Progressing     Problem: Neurosensory - Adult  Goal: Achieves stable or improved neurological status  Outcome: Progressing  Goal: Absence of seizures  Outcome: Progressing  Goal: Remains free of injury related to seizures activity  Outcome: Progressing  Goal: Achieves maximal functionality and self care  Outcome: Progressing     Problem: Cardiovascular - Adult  Goal: Maintains optimal cardiac output and hemodynamic stability  Outcome: Progressing  Goal: Absence of cardiac dysrhythmias or at baseline  Outcome:  Progressing     Problem: Musculoskeletal - Adult  Goal: Return mobility to safest level of function  Outcome: Progressing  Flowsheets (Taken 7/17/2024 2000)  Return Mobility to Safest Level of Function:   Assess patient stability and activity tolerance for standing, transferring and ambulating with or without assistive devices   Assist with transfers and ambulation using safe patient handling equipment as needed  Goal: Maintain proper alignment of affected body part  Outcome: Progressing  Goal: Return ADL status to a safe level of function  Outcome: Progressing  Flowsheets (Taken 7/17/2024 2000)  Return ADL Status to a Safe Level of Function:   Administer medication as ordered   Assess activities of daily living deficits and provide assistive devices as needed

## 2024-07-18 NOTE — PROGRESS NOTES
[facility] Daily Progress Note      Admit Date:  7/16/2024          Subjective:  Mr. Garay is seen today with no overnight events. Wife at bedside.        History of present illness:   Te Garay is a 59 y.o. patient well-known to me.  He was admitted with altered neurologic status but not diagnosed with definitive stroke.  He did receive TNK.  Neurologic deficits have improved and are close to baseline again.  He has known CAD s/p CABG with known ischemic cardiomyopathy.  He does have significant stenosis in his SVG-OM.  He is currently undergoing evaluation for kidney transplant candidacy.  During the current admission he had developed worsening chest discomfort which is persistent for 2 to 3 days.  Ultimately he, along with medical staff, figured out that it was likely fluid retention due to missed (1) or incomplete peritoneal dialysis sessions due to dialysis catheter dysfunction.  He is now feeling much better.  He also recalls having chest discomfort since, similar, during times of volume overload.  Serial echoes obtained suggest reduction in the most recent LV systolic function in comparison to the prior.     Objective:   BP (!) 163/118   Pulse (!) 108   Temp 97 °F (36.1 °C) (Temporal)   Resp 20   Ht 1.727 m (5' 8\")   Wt 89.4 kg (197 lb 1.5 oz) Comment: on cycler and at fill of 700 ml  SpO2 93%   BMI 29.97 kg/m²     Intake/Output Summary (Last 24 hours) at 7/18/2024 0828  Last data filed at 7/18/2024 0500  Gross per 24 hour   Intake 2230 ml   Output 3355 ml   Net -1125 ml       Physical Exam:  General:  Awake, alert, oriented x 3, NAD  Skin:  Warm and dry  Neck:  JVD normal  Chest:  bilateral rhonchi   Cardiovascular:  RRR S1S2, no S3,   Abdomen:  Soft, ND, NT, No HSM  Extremities:  1+ edema    Medications:    insulin glargine  8 Units SubCUTAneous Daily    insulin lispro  10 Units SubCUTAneous Once    insulin lispro  0-4 Units SubCUTAneous Nightly    insulin lispro  0-16 Units  with peritoneal dialysis catheter dysfunction.  Different than his anginal discomfort.  Will discuss best option of PCI as well as timing would likely be staged future once medically optimized..       Hyperkalemia  Plan: Peritoneal dialysis resumed.  Per nephrology.       Cardiomyopathy (HCC)  Plan: Echo was reviewed and not significantly changed, per my review, but did note some decrease on previous admission echo.  Volume overload can contribute to this.  Additionally there is a significantly stenosed SVG-OM that he would probably benefit from revascularization.  Complex scenario.  Also will need to reassess LV systolic function when medically optimized, 3 months with limited echo or MRI.  Possible need for ICD in future if LVEF less than 35%.       HLD (hyperlipidemia)  Plan: Statin for LDL less than 70.       PVD (peripheral vascular disease) ([facility])  Plan: Per vascular surgery.       Chronic combined systolic and diastolic heart failure ([facility])  Plan: Does not make much urine.  Volume control is by peritoneal dialysis.       ESRD on peritoneal dialysis ([facility])  Plan: Urology.  May need catheter exchange if continued difficulty with exchanges.       Seizures ([facility])  Plan: Prior history of.       HTN (hypertension), benign  Plan: Continue to monitor.       Received intravenous tissue plasminogen activator (tPA) in emergency department  Plan: Per neurology.       Type 1 diabetes mellitus ([facility])  Plan: Primary service.        Plan: Chest pain overall does not sound like his typical angina.  It is most likely related to volume overload related to retained peritoneal for peritoneal dialysis.  He has had this type of chest discomfort in the past when volume overloaded.  Continue medical management of his cardiac status for now.  Will evaluate for timing, when medically optimized, for PCI of SVG-OM.  Will also need to reassess LV systolic function or cardiac MRI.        Scribe's Attestation: This note was scribed in the

## 2024-07-18 NOTE — PROGRESS NOTES
[facility] Pulmonary/CCM Progress note      Admit Date: 7/16/2024    Chief Complaint: Dysphagia and left-sided hemiparesis    Subjective:     Interval History: Appears somewhat drowsy today and states that he aches all over.  Wife at bedside.  Appears dyspneic, on 2 L O2.    Scheduled Meds:   insulin glargine  8 Units SubCUTAneous Daily    isosorbide mononitrate  30 mg Oral Nightly    [START ON 7/19/2024] isosorbide mononitrate  60 mg Oral Daily    dianeal lo-miller 4.25%  2,000 mL IntraPERitoneal Q4H    Insulin Pump - Bolus Dose   SubCUTAneous 4x Daily AC & HS    Insulin Pump - Basal Dose   SubCUTAneous Daily    furosemide  80 mg IntraVENous BID    insulin lispro  0-4 Units SubCUTAneous Nightly    insulin lispro  0-16 Units SubCUTAneous TID WC    sodium chloride flush  5-40 mL IntraVENous 2 times per day    sodium chloride flush  5-40 mL IntraVENous 2 times per day    [Held by provider] aspirin  81 mg Oral Daily    Or    [Held by provider] aspirin  300 mg Rectal Daily    hydrALAZINE  50 mg Oral TID    ranolazine  500 mg Oral BID    rosuvastatin  40 mg Oral QPM    sacubitril-valsartan  1 tablet Oral BID    pantoprazole  40 mg IntraVENous Daily     Continuous Infusions:   sodium chloride      dextrose       PRN Meds:ipratropium 0.5 mg-albuterol 2.5 mg, nitroglycerin, sodium chloride, sodium chloride flush, ondansetron **OR** ondansetron, polyethylene glycol, dextrose bolus **OR** dextrose bolus, glucagon (rDNA), dextrose, aluminum & magnesium hydroxide-simethicone, perflutren lipid microspheres, morphine    Review of Systems  Constitutional: negative for fatigue, fevers, malaise and weight loss  Ears, nose, mouth, throat: negative for ear drainage, epistaxis, hoarseness, nasal congestion, sore throat and voice change  Respiratory: negative except for dyspnea on exertion and shortness of breath  Cardiovascular: negative for chest pain, chest pressure/discomfort, irregular heart beat, lower extremity edema and

## 2024-07-18 NOTE — FLOWSHEET NOTE
07/18/24 1210   Vital Signs   /65   Temp 97 °F (36.1 °C)   Pulse 78   Respirations 16   Weight - Scale 92.1 kg (203 lb 0.7 oz)   Weight Method Bed scale     Disconnected from [facility]CP[facility] per protocol.    Effluent: clear yellow    Total time: 09:50   Total UF:  1808 ml.  Total Volume:  31108 ml.  Dwell time gained:  01:14    Pt Tolerated procedure: without difficulty.    Report given to: Ludivina Orellana RN

## 2024-07-18 NOTE — PROGRESS NOTES
07/18/24 0027   NIV Type   $NIV $Daily Charge   Ventilator ID [id number]   NIV Started/Stopped On   Equipment Type V60   Mode Bilevel   Mask Type Full face mask   Mask Size Large   Assessment   Pulse (!) 102   Respirations 20   SpO2 99 %   Breath Sounds   Respiratory Pattern Regular   Breath Sounds Bilateral Diminished   Settings/Measurements   PIP Observed 13 cm H20   IPAP 12 cmH20   CPAP/EPAP 5 cmH2O   Vt (Measured) 686 mL   Rate Ordered 12   FiO2  40 %   I Time/ I Time % 1 s   Minute Volume (L/min) 14.2 Liters   Mask Leak (lpm) 49 lpm   Patient's Home Machine No   Alarm Settings   Alarms On Y   Low Pressure (cmH2O) 3 cmH2O   High Pressure (cmH2O) 30 cmH2O   RR Low (bpm) 13   RR High (bpm) 40 br/min

## 2024-07-18 NOTE — PROGRESS NOTES
[facility]   Nephrology progress Note  [phone]  [phone]   Tier 3.Utan    Reason for Consult:  ESRD on PD   The patient is a 59 y.o. male with significant past medical history of type 1 diabetes diagnosed when he was 11-year-old, ESRD on peritoneal dialysis for the last 4 to 5 years, coronary artery disease s/p CABG, carotid artery stenosis s/p stent placement in the right ICA, left foot infection hypertension, hyperlipidemia, nephrolithiasis, presented to the hospital this time with having woken up with sudden onset of headache chest pain, in the ED he was evaluated and showed dysarthria and left lower lobe extremity weakness.  He was seen by Dr. Hannon of the stroke team., was given tenelctapase      Interval  History:  7/17/24: there were issues with peritoneal dialysis last night.  The machine kept alarming.  The staff could not troubleshoot it and dialysis was stopped.   This morning  morning and was short of breath the patient more short of breath and his potassium level at 808 16 was 6.4 with a creatinine of 7.8 and a bun of 54.  Blood sugar was elevated at 356 so it was felt that part of the rise in potassium could be due to the hypoglycemia.  The hyperglycemia was treated and the repeat potassium was 5.5 at 10:54 AM  Patient is constipated  7/18/24: lying in bed , wife  by bedside    PHYSICAL EXAM:    Vitals:    /65   Pulse (!) 105   Temp 97.7 °F (36.5 °C) (Temporal)   Resp 20   Ht 1.727 m (5' 8\")   Wt 89.4 kg (197 lb 1.5 oz) Comment: on cycler and at fill of 700 ml  SpO2 94%   BMI 29.97 kg/m²   I/O last 3 completed shifts:  In: 2230 [P.O.:720; I.V.:10; Other:1500]  Out: 4005 [Urine:1500; Other:2505]  No intake/output data recorded.    Physical Exam:  Gen:  alert, oriented x 3  PERRL , EOM +  Pallor +, No icterus  NC - oxygen   JVP not raised   CV: RRR no murmur or rub .Mid sternal scar of CABG   Lungs:B/ L air entry, Normal breath sounds , bibasilar crackles    Abd: soft, bowel sounds + , No organomegaly . PD catheter  Ext: Leg  edema + , no cyanosis  Skin: Warm.  No rash  Neuro: No focal deficit      DATA:    CBC with Differential:    Lab Results   Component Value Date/Time    WBC 8.8 07/18/2024 08:30 AM    RBC 3.65 07/18/2024 08:30 AM    HGB 10.7 07/18/2024 08:30 AM    HCT 33.7 07/18/2024 08:30 AM     07/18/2024 08:30 AM    MCV 92.3 07/18/2024 08:30 AM    MCH 29.3 07/18/2024 08:30 AM    MCHC 31.8 07/18/2024 08:30 AM    RDW 15.0 07/18/2024 08:30 AM    LYMPHOPCT 5.0 07/17/2024 08:17 AM    MONOPCT 6.9 07/17/2024 08:17 AM    EOSPCT 0.0 07/17/2024 08:17 AM    BASOPCT 0.2 07/17/2024 08:17 AM    MONOSABS 0.8 07/17/2024 08:17 AM    LYMPHSABS 0.6 07/17/2024 08:17 AM    EOSABS 0.0 07/17/2024 08:17 AM    BASOSABS 0.0 07/17/2024 08:17 AM    DIFFTYPE Scan-K 06/15/2013 01:00 AM     CMP:    Lab Results   Component Value Date/Time     07/18/2024 08:30 AM    K 5.1 07/18/2024 08:30 AM    K 5.2 07/16/2024 07:35 AM    CL 91 07/18/2024 08:30 AM    CO2 21 07/18/2024 08:30 AM    BUN 60 07/18/2024 08:30 AM    CREATININE 8.4 07/18/2024 08:30 AM    GFRAA 9 10/03/2022 04:45 AM    GFRAA 44 06/15/2013 01:00 AM    AGRATIO 1.1 07/16/2024 07:35 AM    LABGLOM 7 07/18/2024 08:30 AM    LABGLOM 6 04/30/2024 04:51 AM    GLUCOSE 451 07/18/2024 08:30 AM    CALCIUM 8.4 07/18/2024 08:30 AM    BILITOT <0.2 07/16/2024 07:35 AM    ALKPHOS 91 07/16/2024 07:35 AM    AST 15 07/16/2024 07:35 AM    ALT 19 07/16/2024 07:35 AM     Phosphorus:    Lab Results   Component Value Date/Time    PHOS 5.5 07/04/2024 05:55 AM     Last 3 Troponin:    Lab Results   Component Value Date/Time    TROPONINI 0.10 10/01/2022 02:20 PM    TROPONINI 0.05 10/01/2022 03:56 AM    TROPONINI 0.06 05/20/2022 05:18 PM     U/A:    Lab Results   Component Value Date/Time    COLORU Yellow 04/19/2024 10:30 AM    PROTEINU 100 04/19/2024 10:30 AM    PHUR 5.0 04/19/2024 10:30 AM    WBCUA 3 04/19/2024 10:30 AM    RBCUA 2 04/19/2024 10:30 AM

## 2024-07-18 NOTE — PROGRESS NOTES
12:45- manual PD exchange started after patient was taken off cycler by PD Rn. 2000 mls instilled.     1645- Patient drained- 3000 mls out.     1700-  Second and final bag instilled- 2000 mls.     Will drain patient  at 2100 and then put put on the cycler tonight.

## 2024-07-18 NOTE — DIALYSIS
Dressing changed. Site Clean, dry and intact. Treatment initiated without complications or complaints from patient. Last bowel movement 7/15 per patient. Initial drain bypass. Patient resting comfortably with VSS upon dialysis RN exit from room. Yenifer Orellana RN notified of start of treatment and hotline number located on top of machine for any questions about troubleshooting during after hours.

## 2024-07-18 NOTE — FLOWSHEET NOTE
07/18/24 1858   Vital Signs   /60   Temp 97.4 °F (36.3 °C)   Pulse (!) 104   Respirations 24   SpO2 97 %   Weight - Scale 91 kg (200 lb 9.9 oz)   Weight Method Bed scale     Connected to Cycler per protocol.    CCPD order: (1) 2.5 %/ (1) 4.25 % Delflex used.                       Total Volume: 7500 ml                       Therapy Time Duration (Hours): 12:00                        Exchange Volume: 2.5 L                        Number of Exchanges: 3                        No Final Fill    Dwell Time: 03:00  Fill Time: 00:25  Drain Time: 00:35    2nd manual drain shortened by 2 hours in order to place on CCPD for the night.     Initial drain volume: 153 ml  Intial UF volume: 152 ml    Effluent clear without fibrin.    Exit site care done per protocol.  Site without redness/drainage, clean/dry.      DSD/occlusive to site.     Report given to Lucia Allan RN

## 2024-07-19 ENCOUNTER — APPOINTMENT (OUTPATIENT)
Dept: GENERAL RADIOLOGY | Age: 59
DRG: 061 | End: 2024-07-19
Payer: MEDICARE

## 2024-07-19 LAB
BASOPHILS # BLD: 0.1 K/UL (ref 0–0.2)
BASOPHILS NFR BLD: 2 %
DEPRECATED RDW RBC AUTO: 15.1 % (ref 12.4–15.4)
EOSINOPHIL # BLD: 0.1 K/UL (ref 0–0.6)
EOSINOPHIL NFR BLD: 1.5 %
GLUCOSE BLD-MCNC: 263 MG/DL (ref 70–99)
GLUCOSE BLD-MCNC: 89 MG/DL (ref 70–99)
HCT VFR BLD AUTO: 36.4 % (ref 40.5–52.5)
HGB BLD-MCNC: 11.7 G/DL (ref 13.5–17.5)
LYMPHOCYTES # BLD: 0.7 K/UL (ref 1–5.1)
LYMPHOCYTES NFR BLD: 12.6 %
MCH RBC QN AUTO: 29.7 PG (ref 26–34)
MCHC RBC AUTO-ENTMCNC: 32.2 G/DL (ref 31–36)
MCV RBC AUTO: 92 FL (ref 80–100)
MONOCYTES # BLD: 0.5 K/UL (ref 0–1.3)
MONOCYTES NFR BLD: 9.4 %
NEUTROPHILS # BLD: 3.9 K/UL (ref 1.7–7.7)
NEUTROPHILS NFR BLD: 74.5 %
PERFORMED ON: ABNORMAL
PERFORMED ON: NORMAL
PLATELET # BLD AUTO: 194 K/UL (ref 135–450)
PMV BLD AUTO: 10 FL (ref 5–10.5)
RBC # BLD AUTO: 3.96 M/UL (ref 4.2–5.9)
WBC # BLD AUTO: 5.2 K/UL (ref 4–11)

## 2024-07-19 PROCEDURE — 90945 DIALYSIS ONE EVALUATION: CPT

## 2024-07-19 PROCEDURE — 6370000000 HC RX 637 (ALT 250 FOR IP): Performed by: HOSPITALIST

## 2024-07-19 PROCEDURE — 36415 COLL VENOUS BLD VENIPUNCTURE: CPT

## 2024-07-19 PROCEDURE — 97530 THERAPEUTIC ACTIVITIES: CPT

## 2024-07-19 PROCEDURE — 2580000003 HC RX 258: Performed by: HOSPITALIST

## 2024-07-19 PROCEDURE — 85025 COMPLETE CBC W/AUTO DIFF WBC: CPT

## 2024-07-19 PROCEDURE — 97535 SELF CARE MNGMENT TRAINING: CPT

## 2024-07-19 PROCEDURE — 6360000002 HC RX W HCPCS: Performed by: INTERNAL MEDICINE

## 2024-07-19 PROCEDURE — 99233 SBSQ HOSP IP/OBS HIGH 50: CPT | Performed by: INTERNAL MEDICINE

## 2024-07-19 PROCEDURE — 2580000003 HC RX 258: Performed by: INTERNAL MEDICINE

## 2024-07-19 PROCEDURE — 99233 SBSQ HOSP IP/OBS HIGH 50: CPT | Performed by: NURSE PRACTITIONER

## 2024-07-19 PROCEDURE — 6370000000 HC RX 637 (ALT 250 FOR IP)

## 2024-07-19 PROCEDURE — 94660 CPAP INITIATION&MGMT: CPT

## 2024-07-19 PROCEDURE — 6370000000 HC RX 637 (ALT 250 FOR IP): Performed by: STUDENT IN AN ORGANIZED HEALTH CARE EDUCATION/TRAINING PROGRAM

## 2024-07-19 PROCEDURE — 2700000000 HC OXYGEN THERAPY PER DAY

## 2024-07-19 PROCEDURE — 94761 N-INVAS EAR/PLS OXIMETRY MLT: CPT

## 2024-07-19 PROCEDURE — 2060000000 HC ICU INTERMEDIATE R&B

## 2024-07-19 PROCEDURE — 71045 X-RAY EXAM CHEST 1 VIEW: CPT

## 2024-07-19 PROCEDURE — 6360000002 HC RX W HCPCS: Performed by: STUDENT IN AN ORGANIZED HEALTH CARE EDUCATION/TRAINING PROGRAM

## 2024-07-19 PROCEDURE — 97116 GAIT TRAINING THERAPY: CPT

## 2024-07-19 RX ADMIN — ISOSORBIDE MONONITRATE 30 MG: 30 TABLET, EXTENDED RELEASE ORAL at 20:50

## 2024-07-19 RX ADMIN — SACUBITRIL AND VALSARTAN 1 TABLET: 49; 51 TABLET, FILM COATED ORAL at 10:21

## 2024-07-19 RX ADMIN — FUROSEMIDE 80 MG: 10 INJECTION, SOLUTION INTRAMUSCULAR; INTRAVENOUS at 10:22

## 2024-07-19 RX ADMIN — LEVETIRACETAM 500 MG: 250 TABLET, FILM COATED ORAL at 10:21

## 2024-07-19 RX ADMIN — RANOLAZINE 500 MG: 500 TABLET, EXTENDED RELEASE ORAL at 10:21

## 2024-07-19 RX ADMIN — HYDRALAZINE HYDROCHLORIDE 50 MG: 25 TABLET ORAL at 10:20

## 2024-07-19 RX ADMIN — SODIUM CHLORIDE, SODIUM LACTATE, CALCIUM CHLORIDE, MAGNESIUM CHLORIDE AND DEXTROSE 2000 ML: 4.25; 538; 448; 18.3; 5.08 INJECTION, SOLUTION INTRAPERITONEAL at 02:17

## 2024-07-19 RX ADMIN — SODIUM CHLORIDE, PRESERVATIVE FREE 10 ML: 5 INJECTION INTRAVENOUS at 20:00

## 2024-07-19 RX ADMIN — HYDRALAZINE HYDROCHLORIDE 50 MG: 25 TABLET ORAL at 20:50

## 2024-07-19 RX ADMIN — SACUBITRIL AND VALSARTAN 1 TABLET: 49; 51 TABLET, FILM COATED ORAL at 20:50

## 2024-07-19 RX ADMIN — SODIUM CHLORIDE, SODIUM LACTATE, CALCIUM CHLORIDE, MAGNESIUM CHLORIDE AND DEXTROSE 2000 ML: 4.25; 538; 448; 18.3; 5.08 INJECTION, SOLUTION INTRAPERITONEAL at 15:29

## 2024-07-19 RX ADMIN — SODIUM CHLORIDE, SODIUM LACTATE, CALCIUM CHLORIDE, MAGNESIUM CHLORIDE AND DEXTROSE 2000 ML: 4.25; 538; 448; 18.3; 5.08 INJECTION, SOLUTION INTRAPERITONEAL at 06:20

## 2024-07-19 RX ADMIN — SODIUM CHLORIDE, SODIUM LACTATE, CALCIUM CHLORIDE, MAGNESIUM CHLORIDE AND DEXTROSE 2000 ML: 4.25; 538; 448; 18.3; 5.08 INJECTION, SOLUTION INTRAPERITONEAL at 10:16

## 2024-07-19 RX ADMIN — ISOSORBIDE MONONITRATE 60 MG: 60 TABLET, EXTENDED RELEASE ORAL at 10:20

## 2024-07-19 RX ADMIN — PANTOPRAZOLE SODIUM 40 MG: 40 INJECTION, POWDER, FOR SOLUTION INTRAVENOUS at 10:21

## 2024-07-19 RX ADMIN — RANOLAZINE 500 MG: 500 TABLET, EXTENDED RELEASE ORAL at 20:50

## 2024-07-19 ASSESSMENT — PAIN SCALES - GENERAL
PAINLEVEL_OUTOF10: 0
PAINLEVEL_OUTOF10: 0

## 2024-07-19 NOTE — PLAN OF CARE

## 2024-07-19 NOTE — PROGRESS NOTES
[facility]   Cardiology Progress Note     Date: 7/19/2024  Admit Date: 7/16/2024     Reason for consultation:     Chief Complaint   Patient presents with    Headache     PT arrived [facility] from home pt receives dialysis at home when he suddenly began to act abnormal according to the wife. Pt states he has left foot pain and a severe headache and feels dehydrated.        History of Present Illness: History obtained from patient and medical record.     Te Garay is a 59 y.o. male was admitted with altered neurologic status but not diagnosed with definitive stroke. He did receive TNK. Neurologic deficits have improved and are close to baseline again. He has known CAD s/p CABG with known ischemic cardiomyopathy. He does have significant stenosis in his SVG-OM. He is currently undergoing evaluation for kidney transplant candidacy. During the current admission he had developed worsening chest discomfort which is persistent for 2 to 3 days. Ultimately he, along with medical staff, figured out that it was likely fluid retention due to missed (1) or incomplete peritoneal dialysis sessions due to dialysis catheter dysfunction. He is now feeling much better. He also recalls having chest discomfort since, similar, during times of volume overload. Serial echoes obtained suggest reduction in the most recent LV systolic function in comparison to the prior.  (per consult note)    Interval Hx: Today, he is feeling much better. Have been having issues with PD. Was retaining a significant about of fluid. After being drained his chest pain resolved and breathing significantly improved. Tele ST.     Patient seen and examined. Clinical notes reviewed. Telemetry reviewed.  No new complaints today. No major events overnight.   Denies having chest pain, palpitations, shortness of breath, orthopnea/PND, cough, or dizziness at the time of this visit.      Past Medical History:  Past Medical History:   Diagnosis Date     decrease on previous admission echo. Volume overload can contribute to this. To consider SVG-OM PCI.   ~ plan to reassess LV function in 3 mo     ESRD  ~ PD  ~ undergoing evaluation for kidney transplant candidacy   ~ per nephrology     HTN  HLD  T1DM    Multiple medical conditions with risk of decompensation.   All pertinent information and plan of care discussed with the physician.  All questions and concerns were addressed to the patient. Alternatives to my treatment were discussed. I have discussed the above stated plan with patient and spouse and the nurse. The patient and spouse verbalized understanding and agreed with the plan.    Thank you for allowing to us to participate in the care of Te ZENA Garay.    Total visit time > 55 minutes; > 50% spend counseling / coordinating care. I reviewed interval history, physical exam, review of data including labs, imaging, development and implementation of treatment plan and coordination of complex care. Counseled on risk factor modifications.     Shania YORK-CNP  [facility]   Office: [phone]    NOTE:  This report was transcribed using voice recognition software.  Every effort was made to ensure accuracy; however, inadvertent computerized transcription errors may be present.

## 2024-07-19 NOTE — PROGRESS NOTES
07/19/24 0050   NIV Type   $NIV $Daily Charge   Ventilator ID [id number]   NIV Started/Stopped On   Equipment Type V60   Mode Biphasic   Mask Type Full face mask   Mask Size Large   Assessment   Pulse (!) 115   Respirations 15   SpO2 100 %   Breath Sounds   Respiratory Pattern Regular   Breath Sounds Bilateral Diminished   Settings/Measurements   PIP Observed 13 cm H20   IPAP 12 cmH20   CPAP/EPAP 5 cmH2O   Vt (Measured) 692 mL   Rate Ordered 12   FiO2  40 %   I Time/ I Time % 1 s   Minute Volume (L/min) 8.5 Liters   Mask Leak (lpm) 41 lpm   Patient's Home Machine No   Alarm Settings   Alarms On Y   Low Pressure (cmH2O) 3 cmH2O   High Pressure (cmH2O) 30 cmH2O   RR Low (bpm) 13   RR High (bpm) 40 br/min   Oxygen Therapy/Pulse Ox   O2 Therapy Oxygen   O2 Device PAP (positive airway pressure)

## 2024-07-19 NOTE — PROGRESS NOTES
[facility]  Diabetes Education   Progress Note       NAME:  Te Garay  MEDICAL RECORD NUMBER:  [id number]  AGE: 59 y.o.   GENDER: male  : 1965  TODAY'S DATE:  2024    Subjective   Reason for Diabetes Education Evaluation and Assessment: Insulin Pump patient    Visit Type: evaluation      Te Garay is a 59 y.o. male referred by:  [x] Physician  [] Nursing  [] Chart Review   [] Other:     PAST MEDICAL HISTORY        Diagnosis Date    Angina at rest ([facility])     Cardiomyopathy ([facility])     Carotid artery stenosis 10/25/2016    SUZANNA stented with 8 x 30 mm non-tapered Xact stent    Charcot foot due to diabetes mellitus ([facility]) 2024    CHF (congestive heart failure) ([facility]) 2015    EF 30%     CKD (chronic kidney disease) stage 2, GFR 60-89 ml/min     Coronary artery disease     sp CABG UC    Coronary artery disease involving native heart with angina pectoris ([facility]) 2010    Formatting of this note might be different from the original.   s/p CABG      Last Assessment & Plan:    Formatting of this note might be different from the original.   s/p PTCA and stent during  hosptialization.        Cardiology (Dr. Celestin)  has told him that he will get cardiac clearance, but he needs to have angioplasty at some point because there is a small lesion that needs to be addr    De Quervain thyroiditis 2023    Last Assessment & Plan:    Formatting of this note might be different from the original.   He has left texting thumb x 3 months.  This is not carpal tunnel.  Evidently, he has texted more over the last 1.5 years than before because his wife would like a more immediate response.  He cannot receive NSAIDS, and I cannot give a steroid injection.  Review of the UpToDate does state that immobility does    Diabetic peripheral neuropathy ([facility])     Diabetic polyneuropathy associated with type 1 diabetes mellitus ([facility]) 2024    Diastolic HF (heart failure) ([facility])     Essential  heart failure ([facility]) I50.42    Acute CVA (cerebrovascular accident) ([facility]) I63.9    ESRD on peritoneal dialysis ([facility]) N18.6, Z99.2    ESRD (end stage renal disease) (HCC) N18.6    Hyperkalemia E87.5    Seizures (HCC) R56.9    Pancytopenia (HCC) D61.818    Coronary artery disease due to lipid rich plaque I25.10, I25.83    HTN (hypertension), benign I10    Received intravenous tissue plasminogen activator (tPA) in emergency department Z92.82    Type 1 diabetes mellitus (HCC) E10.9    Myoclonus G25.3    Acute pulmonary edema (HCC) J81.0    Cardiomyopathy (HCC) I42.9        BP 96/75   Pulse (!) 105   Temp (!) 96.7 °F (35.9 °C) (Temporal)   Resp 21   Ht 1.727 m (5' 8\")   Wt 90.2 kg (198 lb 13.7 oz)   SpO2 100%   BMI 30.24 kg/m²     HgBA1c:    Lab Results   Component Value Date/Time    LABA1C 8.0 07/16/2024 03:30 AM       Recent Labs     07/18/24  1847 07/18/24  2105 07/19/24  1012 07/19/24  1400   POCGLU 226* 231* 263* 89       BUN/Creatinine:    Lab Results   Component Value Date/Time    BUN 60 07/18/2024 08:30 AM    CREATININE 8.4 07/18/2024 08:30 AM       Assessment        Diabetes Management and Education    Does the patient have a Primary Care Physician? Yes, Ryan Mckeon     Does the patient require new medication instruction? No    Person responsible for administration of Insulin/Medication:     [x] Self     [] Caregiver       [] Spouse       [] Other Family Member   []  Other    Insulin Instruction:  insulin pump  Injection Site:   [x] location    [x] rotation     Level of patient/caregiver understanding:     [x] Verbalized Understanding   [] Demonstrated Understanding       [x] Teach Back       [] Needs Reinforcement     []  Other:        Does the patient/caregiver monitor Blood Glucoses? Yes    Does the patient/caregiver follow a Meal Plan? Yes   Reviewed importance of eating three meals per day and plate method for consistent carb intake.      Level of patient/caregiver understanding:     [x] Verbalized

## 2024-07-19 NOTE — PROGRESS NOTES
[facility] - Inpatient Rehabilitation Department   Phone: [phone]    Occupational Therapy    [] Initial Evaluation            [x] Daily Treatment Note         [] Discharge Summary      Patient: Te Garay   : 1965   MRN: [id number]   Date of Service:  2024    Admitting Diagnosis:  Acute CVA (cerebrovascular accident) ([facility])  Current Admission Summary: 59 y.o. male with significant past medical history of type 1 diabetes diagnosed when he was 11-year-old, ESRD on peritoneal dialysis for the last 4 to 5 years, coronary artery disease s/p CABG, carotid artery stenosis s/p stent placement in the right ICA, left foot infection hypertension, hyperlipidemia, nephrolithiasis, presented to the hospital this time with having woken up with sudden onset of headache chest pain, in the ED he was evaluated and showed dysarthria and left lower lobe extremity weakness.  He was seen by Dr. Hannon of the stroke team., was given tenelctapase.   Past Medical History:  has a past medical history of Angina at rest ([facility]), Cardiomyopathy ([facility]), Carotid artery stenosis, Charcot foot due to diabetes mellitus ([facility]), CHF (congestive heart failure) ([facility]), CKD (chronic kidney disease) stage 2, GFR 60-89 ml/min, Coronary artery disease, Coronary artery disease involving native heart with angina pectoris ([facility]), De Quervain thyroiditis, Diabetic peripheral neuropathy ([facility]), Diabetic polyneuropathy associated with type 1 diabetes mellitus ([facility]), Diastolic HF (heart failure) ([facility]), Essential hypertension, History of COVID-19, Hyperlipidemia, Hyperlipidemia with target LDL less than 70, Hyperparathyroidism due to renal insufficiency ([facility]), Hypertension goal BP (blood pressure) < 130/80, Peritonitis associated with peritoneal dialysis ([facility]), PVD (peripheral vascular disease) ([facility]), S/P CABG (coronary artery bypass graft), Seizure disorder ([facility]), Seizures ([facility]), Stenosis of carotid artery, Toe osteomyelitis, left ([facility]), and  Information   Lives With: spouse, son, daughter                  Type of Home: house  Home Layout: two level bi-level; 7 steps up with (L) ascending handrail   Home Access:  1 step to enter without rails ; 2 steps to enter from garage  Bathroom Layout: walker accessible  Bathroom Equipment:  n/a  Toilet Height: standard height  Home Equipment: rolling walker, single point cane, manual wheelchair  Transfer Assistance: modified independent with use of SPC   Ambulation Assistance:modified independent with use of SPC  ADL Assistance: independent with all ADL's Pt reports wife helping sometimes with dressing but otherwise does himself  IADL Assistance: independent with homemaking tasks Wife reports doing laundry at home because it's two flight of stairs  Active :        [] Yes                 [x] No  but not active; wife states he is a homebody  Hand Dominance: [] Left                 [x] Right  Current Employment: unemployed  Hobbies: playing [employer], [employer] and [employer]  Recent Falls: reports falling once in 6 months      Subjective  General: Pt supine in bed upon arrival, agreeable to tx - wife present at EOS. Pt reports LLE feeling stronger again  Pain: Patient does not rate upon questioning  Pain Interventions: not applicable and repositioned         Activities of Daily Living  Basic Activities of Daily Living  Lower Extremity Dressing: moderate assistance  Dressing Comments: able to thread and manage hospital pants to waist, requires A to don (R) shoe and (L) surgical shoe -- reports wife helps with shoes as needed at home  Instrumental Activities of Daily Living  No IADL completed on this date.    Functional Mobility  Bed Mobility:  Supine to Sit: supervision  Scooting: supervision  Comments:  Transfers:  Sit to stand transfer:contact guard assistance  Stand to sit transfer: contact guard assistance  Comments: Better hand placement noted this date  Functional Mobility  Functional Mobility

## 2024-07-19 NOTE — PROGRESS NOTES
[facility] - Inpatient Rehabilitation Department   Phone: [phone]    Physical Therapy    [] Initial Evaluation            [x] Daily Treatment Note         [] Discharge Summary      Patient: Te Garay   : 1965   MRN: [id number]   Date of Service:  2024  Admitting Diagnosis: Acute CVA (cerebrovascular accident) ([facility])  Current Admission Summary: The patient is a 59 y.o. male with significant past medical history of type 1 diabetes diagnosed when he was 11-year-old, ESRD on peritoneal dialysis for the last 4 to 5 years, coronary artery disease s/p CABG, carotid artery stenosis s/p stent placement in the right ICA, left foot infection hypertension, hyperlipidemia, nephrolithiasis, presented to the hospital this time with having woken up with sudden onset of headache chest pain, in the ED he was evaluated and showed dysarthria and left lower lobe extremity weakness.  He was seen by Dr. Hannon of the stroke team., was given tenelctapase.  Past Medical History:  has a past medical history of Angina at rest ([facility]), Cardiomyopathy ([facility]), Carotid artery stenosis, Charcot foot due to diabetes mellitus ([facility]), CHF (congestive heart failure) ([facility]), CKD (chronic kidney disease) stage 2, GFR 60-89 ml/min, Coronary artery disease, Coronary artery disease involving native heart with angina pectoris ([facility]), De Quervain thyroiditis, Diabetic peripheral neuropathy ([facility]), Diabetic polyneuropathy associated with type 1 diabetes mellitus ([facility]), Diastolic HF (heart failure) ([facility]), Essential hypertension, History of COVID-19, Hyperlipidemia, Hyperlipidemia with target LDL less than 70, Hyperparathyroidism due to renal insufficiency ([facility]), Hypertension goal BP (blood pressure) < 130/80, Peritonitis associated with peritoneal dialysis ([facility]), PVD (peripheral vascular disease) ([facility]), S/P CABG (coronary artery bypass graft), Seizure disorder ([facility]), Seizures ([facility]), Stenosis of carotid artery, Toe osteomyelitis, left

## 2024-07-19 NOTE — PROGRESS NOTES
[facility] Pulmonary/CCM Progress note      Admit Date: 7/16/2024    Chief Complaint: Dysphagia and left-sided hemiparesis    Subjective:     Interval History: More alert today, less dyspnea-on 2 L O2.  Issues with manual dwell yesterday leading to respiratory distress, required BiPAP temporarily overnight.  Denies any chest or abdominal pain/discomfort.    Scheduled Meds:   [Held by provider] insulin glargine  8 Units SubCUTAneous Daily    isosorbide mononitrate  30 mg Oral Nightly    isosorbide mononitrate  60 mg Oral Daily    Insulin Pump - Bolus Dose   SubCUTAneous 4x Daily AC & HS    Insulin Pump - Basal Dose   SubCUTAneous Daily    levETIRAcetam  500 mg Oral Daily    dianeal lo-miller 4.25%  2,000 mL IntraPERitoneal Q4H    [Held by provider] insulin lispro  0-4 Units SubCUTAneous Nightly    [Held by provider] insulin lispro  0-16 Units SubCUTAneous TID WC    sodium chloride flush  5-40 mL IntraVENous 2 times per day    sodium chloride flush  5-40 mL IntraVENous 2 times per day    [Held by provider] aspirin  81 mg Oral Daily    Or    [Held by provider] aspirin  300 mg Rectal Daily    hydrALAZINE  50 mg Oral TID    ranolazine  500 mg Oral BID    rosuvastatin  40 mg Oral QPM    sacubitril-valsartan  1 tablet Oral BID    pantoprazole  40 mg IntraVENous Daily     Continuous Infusions:   sodium chloride      dextrose       PRN Meds:ipratropium 0.5 mg-albuterol 2.5 mg, nitroglycerin, sodium chloride, sodium chloride flush, ondansetron **OR** ondansetron, polyethylene glycol, dextrose bolus **OR** dextrose bolus, glucagon (rDNA), dextrose, aluminum & magnesium hydroxide-simethicone, perflutren lipid microspheres, morphine    Review of Systems  Constitutional: Fatigue and malaise  Ears, nose, mouth, throat: negative for ear drainage, epistaxis, hoarseness, nasal congestion, sore throat and voice change  Respiratory: negative except for dyspnea on exertion and shortness of breath  Cardiovascular: negative for chest pain, chest  pressure/discomfort, irregular heart beat, lower extremity edema and palpitations  Gastrointestinal: negative for abdominal pain, constipation, diarrhea, jaundice, melena, odynophagia, reflux symptoms and vomiting  Hematologic/lymphatic: negative for bleeding, easy bruising, lymphadenopathy and petechiae  Musculoskeletal:negative for arthralgias, bone pain, muscle weakness, neck pain and stiff joints  Neurological: negative for dizziness, gait problems, headaches, seizures, speech problems, tremors and weakness  Behavioral/Psych: negative for anxiety, behavior problems, depression, fatigue and sleep disturbance  Endocrine: negative for diabetic symptoms including none, neuropathy, polyphagia, polyuria, polydipsia, vomiting and diarrhea and temperature intolerance  Allergic/Immunologic: negative for anaphylaxis, angioedema, hay fever and urticaria    Objective:     Patient Vitals for the past 8 hrs:   BP Temp Temp src Pulse Resp SpO2   07/19/24 1404 96/75 -- -- -- -- --   07/19/24 1025 (!) 122/56 (!) 96.7 °F (35.9 °C) Temporal (!) 105 21 100 %       I/O last 3 completed shifts:  In: 9160 [P.O.:960; Other:8200]  Out: 07535 [Urine:650; Other:42609]  I/O this shift:  In: 4000 [Other:4000]  Out: 6300 [Other:6300]    General Appearance: Awake, in no acute distress  Skin: warm and dry, no rash or erythema  Head: normocephalic and atraumatic  Eyes: pupils equal, round, and reactive to light, extraocular eye movements intact, conjunctivae normal  ENT: external ear and ear canal normal bilaterally, nose without deformity, nasal mucosa and turbinates normal  Neck: supple and non-tender without mass, no cervical lymphadenopathy  Pulmonary/Chest: rales present-bilateral  Cardiovascular: normal rate, regular rhythm,  no murmurs, rubs, distal pulses intact, no carotid bruits  Abdomen: soft, non-tender, non-distended, normal bowel sounds, no masses or organomegaly  Lymph Nodes: Cervical, supraclavicular normal  Extremities: no

## 2024-07-19 NOTE — PROGRESS NOTES
Hospitalist Progress Note    Name:  Te Garay    /Age/Sex: 1965  (59 y.o. male)  MRN & CSN:  [id number] & [id number]    PCP: eRid Lei MD    Date of Admission: 2024    Patient Status:  Inpatient     Chief Complaint:   Chief Complaint   Patient presents with    Headache     PT arrived [place] [facility] from home pt receives dialysis at home when he suddenly began to act abnormal according to the wife. Pt states he has left foot pain and a severe headache and feels dehydrated.        Hospital Course:   Admitted for CVA. Given TNK at 0500 on . Neurology consulted.    All neurological imaging has been negative.    ESRD patient on PD. Nephrology consulted. Did not have PD performed on , which caused electrolyte dysfunction and some additional fluid overload.     Subjective:  Today is:  Hospital Day: 4.  Patient seen and examined in ICU-3911/3911-01.     Sitting up in bed. Had some shortness of breath overnight due to a dialysis issue, but this has been resolved. Eating okay.    Wife at bedside and updated.      Medications:  Reviewed    Infusion Medications    sodium chloride      dextrose       Scheduled Medications    [Held by provider] insulin glargine  8 Units SubCUTAneous Daily    isosorbide mononitrate  30 mg Oral Nightly    isosorbide mononitrate  60 mg Oral Daily    Insulin Pump - Bolus Dose   SubCUTAneous 4x Daily AC & HS    Insulin Pump - Basal Dose   SubCUTAneous Daily    furosemide  80 mg IntraVENous BID    levETIRAcetam  500 mg Oral Daily    dianeal lo-miller 4.25%  2,000 mL IntraPERitoneal Q4H    [Held by provider] insulin lispro  0-4 Units SubCUTAneous Nightly    [Held by provider] insulin lispro  0-16 Units SubCUTAneous TID WC    sodium chloride flush  5-40 mL IntraVENous 2 times per day    sodium chloride flush  5-40 mL IntraVENous 2 times per day    [Held by provider] aspirin  81 mg Oral Daily    Or    [Held by provider] aspirin  300 mg Rectal Daily    hydrALAZINE  50 mg  abnormality.      Findings were discussed with VERONICA Goldman by Paola Wallace on   07/16/2024 at 4:13 a.m.         XR CHEST PORTABLE    (Results Pending)   XR CHEST PORTABLE    (Results Pending)           Assessment/Plan:    Active Hospital Problems    Diagnosis     Hyperkalemia [E87.5]      Priority: Medium    Acute pulmonary edema (HCC) [J81.0]     Cardiomyopathy (HCC) [I42.9]     Received intravenous tissue plasminogen activator (tPA) in emergency department [Z92.82]     Type 1 diabetes mellitus (HCC) [E10.9]     Myoclonus [G25.3]     Seizures (HCC) [R56.9]     ESRD (end stage renal disease) (HCC) [N18.6]     ESRD on peritoneal dialysis (HCC) [N18.6, Z99.2]     Acute CVA (cerebrovascular accident) (HCC) [I63.9]     Chronic combined systolic and diastolic heart failure (HCC) [I50.42]     HTN (hypertension), benign [I10]     Chest pain [R07.9]     PVD (peripheral vascular disease) (HCC) [I73.9]     HLD (hyperlipidemia) [E78.5]     Coronary artery disease due to lipid rich plaque [I25.10, I25.83]          Hospital Day: 4    This is a 59 y.o. male who presented to [facility] on 7/16/2024 and is being treated for:    CVA  -CT head nonacute  -CTA head and neck nonacute  -MRI head non acute  -TNK given at 0500 on 7/16  -CT head ordered for 0500 on 7/17 - non acute  -Echo 7/16/24 showed LVEF 15-20% with severe global hypokinesis  -Statin and aspirin  -Diet  -IVF  -Daily labs; replace electrolytes as needed  -TIA/CVA order set ordered  -Neurology consulted     Hypertension  -Continue home antihypertensives  -Continue home Imdur     ESRD on dialysis  -On peritoneal dialysis - there was an issue overnight with treatment; issue has been resolved  -Patient will eventually need his PD catheter replaced  -Continue lasix  -I/O  -Patient does make urine  -Avoid nephrotoxins  -Nephrology consulted    Hyperlipidemia  -Continue home statin    Type 1 diabetes  -Continue home insulin pump     Seizures  -Not taking Keppra at

## 2024-07-19 NOTE — PROGRESS NOTES
[facility]   Nephrology progress Note  [phone]  [phone]   [email or web address]    Reason for Consult:  ESRD on PD   The patient is a 59 y.o. male with significant past medical history of type 1 diabetes diagnosed when he was 11-year-old, ESRD on peritoneal dialysis for the last 4 to 5 years, coronary artery disease s/p CABG, carotid artery stenosis s/p stent placement in the right ICA, left foot infection hypertension, hyperlipidemia, nephrolithiasis, presented to the hospital this time with having woken up with sudden onset of headache chest pain, in the ED he was evaluated and showed dysarthria and left lower lobe extremity weakness.  He was seen by Dr. Hannon of the stroke team., was given tenelctapase      Interval  History:  7/17/24: there were issues with peritoneal dialysis last night.  The machine kept alarming.  The staff could not troubleshoot it and dialysis was stopped.   This morning  morning and was short of breath the patient more short of breath and his potassium level at 808 16 was 6.4 with a creatinine of 7.8 and a bun of 54.  Blood sugar was elevated at 356 so it was felt that part of the rise in potassium could be due to the hypoglycemia.  The hyperglycemia was treated and the repeat potassium was 5.5 at 10:54 AM  Patient is constipated  7/18/24: lying in bed , wife  by bedside  7/19/24: Doing manual exchanges , last night he had not drained fully from his manual when a PD was started plus he had 500+ fluid in his urinary bladder, this made him very uncomfortable.  A Real catheter was placed and the PD fluid was drained out totally and then he was continued on manual exchanges  Feels a lot better now less distended less short of breath    PHYSICAL EXAM:    Vitals:    BP (!) 122/56   Pulse (!) 105   Temp (!) 96.7 °F (35.9 °C) (Temporal)   Resp 21   Ht 1.727 m (5' 8\")   Wt 90.2 kg (198 lb 13.7 oz)   SpO2 100%   BMI 30.24 kg/m²   I/O last 3 completed shifts:  In: 9160  [P.O.:960; Other:8200]  Out: 61745 [Urine:650; Other:64661]  I/O this shift:  In: 2100 [Other:2100]  Out: 3100 [Other:3100]    Physical Exam:  Gen:  alert, oriented x 3  PERRL , EOM +  Pallor +, No icterus  NC - oxygen   JVP not raised   CV: RRR no murmur or rub .Mid sternal scar of CABG   Lungs:B/ L air entry, Normal breath sounds , crackles Lt base   Abd: soft, bowel sounds + , No organomegaly . PD catheter  Ext: Leg  edema  less, no cyanosis  Skin: Warm.  No rash  Neuro: No focal deficit      DATA:    CBC with Differential:    Lab Results   Component Value Date/Time    WBC 8.8 07/18/2024 08:30 AM    RBC 3.65 07/18/2024 08:30 AM    HGB 10.7 07/18/2024 08:30 AM    HCT 33.7 07/18/2024 08:30 AM     07/18/2024 08:30 AM    MCV 92.3 07/18/2024 08:30 AM    MCH 29.3 07/18/2024 08:30 AM    MCHC 31.8 07/18/2024 08:30 AM    RDW 15.0 07/18/2024 08:30 AM    LYMPHOPCT 5.0 07/17/2024 08:17 AM    MONOPCT 6.9 07/17/2024 08:17 AM    EOSPCT 0.0 07/17/2024 08:17 AM    BASOPCT 0.2 07/17/2024 08:17 AM    MONOSABS 0.8 07/17/2024 08:17 AM    LYMPHSABS 0.6 07/17/2024 08:17 AM    EOSABS 0.0 07/17/2024 08:17 AM    BASOSABS 0.0 07/17/2024 08:17 AM    DIFFTYPE Scan-K 06/15/2013 01:00 AM     CMP:    Lab Results   Component Value Date/Time     07/18/2024 08:30 AM    K 5.1 07/18/2024 08:30 AM    K 5.2 07/16/2024 07:35 AM    CL 91 07/18/2024 08:30 AM    CO2 21 07/18/2024 08:30 AM    BUN 60 07/18/2024 08:30 AM    CREATININE 8.4 07/18/2024 08:30 AM    GFRAA 9 10/03/2022 04:45 AM    GFRAA 44 06/15/2013 01:00 AM    AGRATIO 1.1 07/16/2024 07:35 AM    LABGLOM 7 07/18/2024 08:30 AM    LABGLOM 6 04/30/2024 04:51 AM    GLUCOSE 451 07/18/2024 08:30 AM    CALCIUM 8.4 07/18/2024 08:30 AM    BILITOT <0.2 07/16/2024 07:35 AM    ALKPHOS 91 07/16/2024 07:35 AM    AST 15 07/16/2024 07:35 AM    ALT 19 07/16/2024 07:35 AM     Phosphorus:    Lab Results   Component Value Date/Time    PHOS 5.5 07/04/2024 05:55 AM     Last 3 Troponin:    Lab Results

## 2024-07-20 ENCOUNTER — APPOINTMENT (OUTPATIENT)
Dept: GENERAL RADIOLOGY | Age: 59
DRG: 061 | End: 2024-07-20
Payer: MEDICARE

## 2024-07-20 PROBLEM — N18.5 ANEMIA DUE TO STAGE 5 CHRONIC KIDNEY DISEASE, NOT ON CHRONIC DIALYSIS (HCC): Status: ACTIVE | Noted: 2024-07-20

## 2024-07-20 PROBLEM — K59.00 CONSTIPATION: Status: ACTIVE | Noted: 2024-07-20

## 2024-07-20 PROBLEM — D63.1 ANEMIA DUE TO STAGE 5 CHRONIC KIDNEY DISEASE, NOT ON CHRONIC DIALYSIS (HCC): Status: ACTIVE | Noted: 2024-07-20

## 2024-07-20 PROBLEM — I25.718 CORONARY ARTERY DISEASE OF AUTOLOGOUS VEIN BYPASS GRAFT WITH STABLE ANGINA PECTORIS (HCC): Status: ACTIVE | Noted: 2024-07-20

## 2024-07-20 LAB
ALBUMIN SERPL-MCNC: 3.1 G/DL (ref 3.4–5)
ANION GAP SERPL CALCULATED.3IONS-SCNC: 13 MMOL/L (ref 3–16)
APPEARANCE FLUID: CLEAR
BASOPHILS # BLD: 0 K/UL (ref 0–0.2)
BASOPHILS NFR BLD: 0.5 %
BDY FLUID QUALITY: NORMAL
BUN SERPL-MCNC: 53 MG/DL (ref 7–20)
CALCIUM SERPL-MCNC: 8.2 MG/DL (ref 8.3–10.6)
CELL COUNT FLUID TYPE: NORMAL
CHLORIDE SERPL-SCNC: 95 MMOL/L (ref 99–110)
CO2 SERPL-SCNC: 28 MMOL/L (ref 21–32)
COLOR FLUID: COLORLESS
CREAT SERPL-MCNC: 9.7 MG/DL (ref 0.9–1.3)
DEPRECATED RDW RBC AUTO: 14.6 % (ref 12.4–15.4)
DIFF PNL FLD: NORMAL
EOSINOPHIL # BLD: 0.1 K/UL (ref 0–0.6)
EOSINOPHIL NFR BLD: 2.7 %
FERRITIN SERPL IA-MCNC: 153 NG/ML (ref 30–400)
GFR SERPLBLD CREATININE-BSD FMLA CKD-EPI: 6 ML/MIN/{1.73_M2}
GLUCOSE BLD-MCNC: 113 MG/DL (ref 70–99)
GLUCOSE BLD-MCNC: 176 MG/DL (ref 70–99)
GLUCOSE BLD-MCNC: 177 MG/DL (ref 70–99)
GLUCOSE BLD-MCNC: 242 MG/DL (ref 70–99)
GLUCOSE BLD-MCNC: 261 MG/DL (ref 70–99)
GLUCOSE SERPL-MCNC: 231 MG/DL (ref 70–99)
HCT VFR BLD AUTO: 32.1 % (ref 40.5–52.5)
HGB BLD-MCNC: 10.5 G/DL (ref 13.5–17.5)
IRON SATN MFR SERPL: 15 % (ref 20–50)
IRON SERPL-MCNC: 29 UG/DL (ref 59–158)
LYMPHOCYTES # BLD: 1 K/UL (ref 1–5.1)
LYMPHOCYTES NFR BLD: 22.5 %
MCH RBC QN AUTO: 29.6 PG (ref 26–34)
MCHC RBC AUTO-ENTMCNC: 32.6 G/DL (ref 31–36)
MCV RBC AUTO: 90.8 FL (ref 80–100)
MONOCYTES # BLD: 0.7 K/UL (ref 0–1.3)
MONOCYTES NFR BLD: 14.8 %
NEUTROPHILS # BLD: 2.7 K/UL (ref 1.7–7.7)
NEUTROPHILS NFR BLD: 59.5 %
NT-PROBNP SERPL-MCNC: ABNORMAL PG/ML (ref 0–124)
NUC CELL # FLD: 4 /CUMM
PERFORMED ON: ABNORMAL
PHOSPHATE SERPL-MCNC: 5.3 MG/DL (ref 2.5–4.9)
PLATELET # BLD AUTO: 175 K/UL (ref 135–450)
PMV BLD AUTO: 10.4 FL (ref 5–10.5)
POTASSIUM SERPL-SCNC: 4.6 MMOL/L (ref 3.5–5.1)
RBC # BLD AUTO: 3.54 M/UL (ref 4.2–5.9)
RBC FLUID: <1000 /CUMM
SODIUM SERPL-SCNC: 136 MMOL/L (ref 136–145)
TIBC SERPL-MCNC: 189 UG/DL (ref 260–445)
TSH SERPL DL<=0.005 MIU/L-ACNC: 0.34 UIU/ML (ref 0.27–4.2)
WBC # BLD AUTO: 4.6 K/UL (ref 4–11)

## 2024-07-20 PROCEDURE — 83880 ASSAY OF NATRIURETIC PEPTIDE: CPT

## 2024-07-20 PROCEDURE — 90945 DIALYSIS ONE EVALUATION: CPT

## 2024-07-20 PROCEDURE — 83540 ASSAY OF IRON: CPT

## 2024-07-20 PROCEDURE — 83550 IRON BINDING TEST: CPT

## 2024-07-20 PROCEDURE — 71045 X-RAY EXAM CHEST 1 VIEW: CPT

## 2024-07-20 PROCEDURE — 82728 ASSAY OF FERRITIN: CPT

## 2024-07-20 PROCEDURE — 89050 BODY FLUID CELL COUNT: CPT

## 2024-07-20 PROCEDURE — 97530 THERAPEUTIC ACTIVITIES: CPT

## 2024-07-20 PROCEDURE — 2580000003 HC RX 258: Performed by: HOSPITALIST

## 2024-07-20 PROCEDURE — 6360000002 HC RX W HCPCS: Performed by: HOSPITALIST

## 2024-07-20 PROCEDURE — 84443 ASSAY THYROID STIM HORMONE: CPT

## 2024-07-20 PROCEDURE — 6370000000 HC RX 637 (ALT 250 FOR IP): Performed by: NURSE PRACTITIONER

## 2024-07-20 PROCEDURE — 6370000000 HC RX 637 (ALT 250 FOR IP): Performed by: HOSPITALIST

## 2024-07-20 PROCEDURE — 97535 SELF CARE MNGMENT TRAINING: CPT

## 2024-07-20 PROCEDURE — 85025 COMPLETE CBC W/AUTO DIFF WBC: CPT

## 2024-07-20 PROCEDURE — 2700000000 HC OXYGEN THERAPY PER DAY

## 2024-07-20 PROCEDURE — 6370000000 HC RX 637 (ALT 250 FOR IP)

## 2024-07-20 PROCEDURE — 6360000002 HC RX W HCPCS: Performed by: PEDIATRICS

## 2024-07-20 PROCEDURE — 99233 SBSQ HOSP IP/OBS HIGH 50: CPT | Performed by: CLINICAL NURSE SPECIALIST

## 2024-07-20 PROCEDURE — 1200000000 HC SEMI PRIVATE

## 2024-07-20 PROCEDURE — 80069 RENAL FUNCTION PANEL: CPT

## 2024-07-20 PROCEDURE — 2580000003 HC RX 258: Performed by: INTERNAL MEDICINE

## 2024-07-20 PROCEDURE — 99233 SBSQ HOSP IP/OBS HIGH 50: CPT | Performed by: INTERNAL MEDICINE

## 2024-07-20 PROCEDURE — 94761 N-INVAS EAR/PLS OXIMETRY MLT: CPT

## 2024-07-20 PROCEDURE — 6370000000 HC RX 637 (ALT 250 FOR IP): Performed by: CLINICAL NURSE SPECIALIST

## 2024-07-20 PROCEDURE — 6370000000 HC RX 637 (ALT 250 FOR IP): Performed by: STUDENT IN AN ORGANIZED HEALTH CARE EDUCATION/TRAINING PROGRAM

## 2024-07-20 PROCEDURE — 6360000002 HC RX W HCPCS: Performed by: STUDENT IN AN ORGANIZED HEALTH CARE EDUCATION/TRAINING PROGRAM

## 2024-07-20 RX ORDER — CARVEDILOL 25 MG/1
25 TABLET ORAL 2 TIMES DAILY WITH MEALS
Status: DISCONTINUED | OUTPATIENT
Start: 2024-07-20 | End: 2024-07-22

## 2024-07-20 RX ORDER — LANOLIN ALCOHOL/MO/W.PET/CERES
3 CREAM (GRAM) TOPICAL NIGHTLY PRN
Status: DISCONTINUED | OUTPATIENT
Start: 2024-07-20 | End: 2024-07-22 | Stop reason: HOSPADM

## 2024-07-20 RX ORDER — LACTULOSE 10 G/15ML
20 SOLUTION ORAL 2 TIMES DAILY PRN
Status: DISCONTINUED | OUTPATIENT
Start: 2024-07-20 | End: 2024-07-22 | Stop reason: HOSPADM

## 2024-07-20 RX ORDER — LACTULOSE 10 G/15ML
SOLUTION ORAL
Status: DISPENSED
Start: 2024-07-20 | End: 2024-07-20

## 2024-07-20 RX ORDER — LACTULOSE 10 G/10G
20 SOLUTION ORAL 2 TIMES DAILY PRN
Status: DISCONTINUED | OUTPATIENT
Start: 2024-07-20 | End: 2024-07-20 | Stop reason: RX

## 2024-07-20 RX ADMIN — MELATONIN TAB 3 MG 3 MG: 3 TAB at 23:40

## 2024-07-20 RX ADMIN — ISOSORBIDE MONONITRATE 60 MG: 60 TABLET, EXTENDED RELEASE ORAL at 08:43

## 2024-07-20 RX ADMIN — ROSUVASTATIN CALCIUM 40 MG: 40 TABLET, COATED ORAL at 17:04

## 2024-07-20 RX ADMIN — ISOSORBIDE MONONITRATE 30 MG: 30 TABLET, EXTENDED RELEASE ORAL at 21:42

## 2024-07-20 RX ADMIN — ONDANSETRON 4 MG: 2 INJECTION INTRAMUSCULAR; INTRAVENOUS at 16:06

## 2024-07-20 RX ADMIN — SACUBITRIL AND VALSARTAN 1 TABLET: 49; 51 TABLET, FILM COATED ORAL at 08:38

## 2024-07-20 RX ADMIN — LEVETIRACETAM 500 MG: 250 TABLET, FILM COATED ORAL at 08:43

## 2024-07-20 RX ADMIN — RANOLAZINE 500 MG: 500 TABLET, EXTENDED RELEASE ORAL at 08:43

## 2024-07-20 RX ADMIN — LACTULOSE 20 G: 10 SOLUTION ORAL at 09:25

## 2024-07-20 RX ADMIN — SODIUM CHLORIDE, PRESERVATIVE FREE 10 ML: 5 INJECTION INTRAVENOUS at 08:44

## 2024-07-20 RX ADMIN — HYDRALAZINE HYDROCHLORIDE 50 MG: 25 TABLET ORAL at 08:43

## 2024-07-20 RX ADMIN — MELATONIN TAB 3 MG 3 MG: 3 TAB at 01:39

## 2024-07-20 RX ADMIN — PANTOPRAZOLE SODIUM 40 MG: 40 INJECTION, POWDER, FOR SOLUTION INTRAVENOUS at 08:38

## 2024-07-20 RX ADMIN — SODIUM CHLORIDE, PRESERVATIVE FREE 10 ML: 5 INJECTION INTRAVENOUS at 21:44

## 2024-07-20 RX ADMIN — CARVEDILOL 25 MG: 25 TABLET, FILM COATED ORAL at 17:04

## 2024-07-20 RX ADMIN — HYDRALAZINE HYDROCHLORIDE 50 MG: 25 TABLET ORAL at 21:42

## 2024-07-20 RX ADMIN — RANOLAZINE 500 MG: 500 TABLET, EXTENDED RELEASE ORAL at 21:41

## 2024-07-20 RX ADMIN — SACUBITRIL AND VALSARTAN 1 TABLET: 49; 51 TABLET, FILM COATED ORAL at 21:42

## 2024-07-20 RX ADMIN — MORPHINE SULFATE 2 MG: 2 INJECTION, SOLUTION INTRAMUSCULAR; INTRAVENOUS at 12:15

## 2024-07-20 RX ADMIN — SODIUM CHLORIDE, SODIUM LACTATE, CALCIUM CHLORIDE, MAGNESIUM CHLORIDE AND DEXTROSE 2000 ML: 4.25; 538; 448; 18.3; 5.08 INJECTION, SOLUTION INTRAPERITONEAL at 13:15

## 2024-07-20 RX ADMIN — CARVEDILOL 25 MG: 25 TABLET, FILM COATED ORAL at 09:02

## 2024-07-20 RX ADMIN — HYDRALAZINE HYDROCHLORIDE 50 MG: 25 TABLET ORAL at 13:58

## 2024-07-20 ASSESSMENT — PAIN DESCRIPTION - ORIENTATION: ORIENTATION: LEFT

## 2024-07-20 ASSESSMENT — PAIN SCALES - GENERAL
PAINLEVEL_OUTOF10: 0
PAINLEVEL_OUTOF10: 0
PAINLEVEL_OUTOF10: 6

## 2024-07-20 ASSESSMENT — PAIN DESCRIPTION - DESCRIPTORS: DESCRIPTORS: ACHING;STABBING

## 2024-07-20 ASSESSMENT — PAIN DESCRIPTION - LOCATION: LOCATION: FOOT

## 2024-07-20 NOTE — PROGRESS NOTES
left ventricular systolic function with a visually estimated EF of 35 - 40%. Left ventricle is dilated. Normal wall thickness. Moderate hypokinesis of the following segments: basal anterior, basal anterolateral, basal inferolateral, mid anterior, mid anterolateral, apical anterior and apical lateral. Akinesis of the following segments: basal anteroseptal, basal inferior and basal inferoseptal. Normal diastolic function.    Right Ventricle: Not well visualized. Right ventricle size is normal.    Mitral Valve: Moderate annular calcification at the posterior leaflet. Mildly thickened leaflets. Mild regurgitation.    Interatrial Septum: Agitated saline study was negative with and without provocation.    Image quality is technically difficult. Contrast used: Definity. Procedure performed with the patient in a sitting position. Patient in immense amount of pain during exam.     Cardiac cath 6/27/2024:  ANGIOGRAM/CORONARY ARTERIOGRAM:        The left main coronary artery has distal LM disease 40%.     The left anterior descending artery has an ostial flush in stent occlusion.  D1 has moderate 40% diffuse disease filled retrograde from LIMA     The left circumflex artery has a mid vessel  filled via L-L and R-L collaterals.  OM1 is ostially      The right coronary artery is dominant vessel with diffuse moderate 30-40% disease.  RPDA has luminal irregularities     SVG-OM1 has a 90% segment of in stent restenosis mid stent segment as well as at vessel touchdown     LIMA-LAD widely patent        RECOMMENDATION:    - Extensive discussion with family, recommend aggressive medical management, vessel is high risk for restenosis and elevated risk for PCI - would require laser atherectomy     Assessment:    1.  Acute CVA neurology following  2.  CAD status post CABG with elevated risk for PCI of SVG-OM  3.  ESRD on peritoneal dialysis (initially PD catheter malfunctioned)  4.  Chronic systolic heart failure   5.  Anemia   6.   Constipation       Plan:    Add iron studies, ferritin and tsh to blood in lab  PCI to SVG-OM in future per Dr Celestin note  Continue hydralazine 50 mg three times a day and imdur 30 mg nightly and 60 mg in am  Continue entresto 49-51 mg twice a day   Resume coreg 25 mg twice a day  Will order some lactulose prn for constipation   No aldactone or sglt2 due to ESRD on PD    Discussed with bedside nurse    NYHA II    Discussed with patient who is agreeable with plan of care.     Thank you for allowing me to participate in the care of your patient.    BRAYAN GIBBONS, APRN - CNS, CNS

## 2024-07-20 NOTE — PROGRESS NOTES
CCPD Order   Exchanges: 3   Exchange Volume: 2500 ml   Total Time: 12 hrs   Dextrose: 2.5%   Total Volume: 7500 ml     Orders verified. Supplies taken to pt's room. Report received. Cycler set up, primed and pre tested. Dressing changed on [facility] Cath site. Pt connected to cycler. CCPD initiated without problem. Initial effluent clear.       If problems should arise please call the [phone] on top of PD cycler machine.

## 2024-07-20 NOTE — PROGRESS NOTES
[facility]   Nephrology progress Note  [phone]  [phone]   [employer]    Reason for Consult:  ESRD on PD   The patient is a 59 y.o. male with significant past medical history of type 1 diabetes diagnosed when he was 11-year-old, ESRD on peritoneal dialysis for the last 4 to 5 years, coronary artery disease s/p CABG, carotid artery stenosis s/p stent placement in the right ICA, left foot infection hypertension, hyperlipidemia, nephrolithiasis, presented to the hospital this time with having woken up with sudden onset of headache chest pain, in the ED he was evaluated and showed dysarthria and left lower lobe extremity weakness.  He was seen by Dr. Hannon of the stroke team., was given tenelctapase      Interval  History:  7/17/24: there were issues with peritoneal dialysis last night.  The machine kept alarming.  The staff could not troubleshoot it and dialysis was stopped.   This morning  morning and was short of breath the patient more short of breath and his potassium level at 808 16 was 6.4 with a creatinine of 7.8 and a bun of 54.  Blood sugar was elevated at 356 so it was felt that part of the rise in potassium could be due to the hypoglycemia.  The hyperglycemia was treated and the repeat potassium was 5.5 at 10:54 AM  Patient is constipated  7/18/24: lying in bed , wife  by bedside  7/19/24: Doing manual exchanges , last night he had not drained fully from his manual when a PD was started plus he had 500+ fluid in his urinary bladder, this made him very uncomfortable.  A Real catheter was placed and the PD fluid was drained out totally and then he was continued on manual exchanges  Feels a lot better now less distended less short of breath  7/20/24 Will spread manual exchanges evenly throughout time when he is not on cycler.  Breathing better.  No CP.      ROS:  In bed.  No fever.  PMFSH:  medications reviewed.    PHYSICAL EXAM:    Vitals:    /85   Pulse (!) 102

## 2024-07-20 NOTE — PLAN OF CARE
Problem: Discharge Planning  Goal: Discharge to home or other facility with appropriate resources  Outcome: Progressing  Flowsheets (Taken 7/19/2024 2000)  Discharge to home or other facility with appropriate resources:   Identify barriers to discharge with patient and caregiver   Arrange for needed discharge resources and transportation as appropriate   Identify discharge learning needs (meds, wound care, etc)   Refer to discharge planning if patient needs post-hospital services based on physician order or complex needs related to functional status, cognitive ability or social support system     Problem: Pain  Goal: Verbalizes/displays adequate comfort level or baseline comfort level  Outcome: Progressing  Flowsheets (Taken 7/20/2024 0000)  Verbalizes/displays adequate comfort level or baseline comfort level:   Encourage patient to monitor pain and request assistance   Assess pain using appropriate pain scale   Notify Licensed Independent Practitioner if interventions unsuccessful or patient reports new pain   Implement non-pharmacological measures as appropriate and evaluate response   Administer analgesics based on type and severity of pain and evaluate response   Consider cultural and social influences on pain and pain management     Problem: Chronic Conditions and Co-morbidities  Goal: Patient's chronic conditions and co-morbidity symptoms are monitored and maintained or improved  Outcome: Progressing  Flowsheets (Taken 7/19/2024 2000)  Care Plan - Patient's Chronic Conditions and Co-Morbidity Symptoms are Monitored and Maintained or Improved:   Monitor and assess patient's chronic conditions and comorbid symptoms for stability, deterioration, or improvement   Update acute care plan with appropriate goals if chronic or comorbid symptoms are exacerbated and prevent overall improvement and discharge   Collaborate with multidisciplinary team to address chronic and comorbid conditions and prevent exacerbation or  deterioration     Problem: Neurosensory - Adult  Goal: Achieves stable or improved neurological status  Outcome: Progressing  Flowsheets (Taken 7/19/2024 2000)  Achieves stable or improved neurological status:   Assess for and report changes in neurological status   Initiate measures to prevent increased intracranial pressure   Maintain blood pressure and fluid volume within ordered parameters to optimize cerebral perfusion and minimize risk of hemorrhage   Monitor temperature, glucose, and sodium. Initiate appropriate interventions as ordered  Goal: Absence of seizures  Outcome: Progressing  Flowsheets (Taken 7/19/2024 2000)  Absence of seizures:   Monitor for seizure activity.  If seizure occurs, document type and location of movements and any associated apnea   If seizure occurs, turn head to side and suction secretions as needed   Administer anticonvulsants as ordered   Diagnostic studies as ordered   Support airway/breathing, administer oxygen as needed  Goal: Remains free of injury related to seizures activity  Outcome: Progressing  Flowsheets (Taken 7/19/2024 2000)  Remains free of injury related to seizure activity:   Maintain airway, patient safety  and administer oxygen as ordered   Monitor patient for seizure activity, document and report duration and description of seizure to Licensed Independent Practitioner   If seizure occurs, turn patient to side and suction secretions as needed   Reorient patient post seizure   Seizure pads on all 4 side rails   Instruct patient/family to notify RN of any seizure activity   Instruct patient/family to call for assistance with activity based on assessment  Goal: Achieves maximal functionality and self care  Outcome: Progressing  Flowsheets (Taken 7/19/2024 2000)  Achieves maximal functionality and self care:   Monitor swallowing and airway patency with patient fatigue and changes in neurological status   Encourage and assist patient to increase activity and self care

## 2024-07-20 NOTE — PROGRESS NOTES
Hospitalist Progress Note    Name:  Te Garay    /Age/Sex: 1965  (59 y.o. male)  MRN & CSN:  [id number] & [id number]    PCP: Reid Lei MD    Date of Admission: 2024    Patient Status:  Inpatient     Chief Complaint:   Chief Complaint   Patient presents with    Headache     PT arrived [place] [facility] from home pt receives dialysis at home when he suddenly began to act abnormal according to the wife. Pt states he has left foot pain and a severe headache and feels dehydrated.        Hospital Course:   Admitted for possible CVA. Given TNK at 0500 on . Neurology consulted.    All neurological imaging has been negative.    ESRD patient on PD. Nephrology consulted. Did not have PD performed on , which caused electrolyte dysfunction and some additional fluid overload.     Subjective:  Today is:  Hospital Day: 5.  Patient seen and examined in ICU-3911/3911-01.     Sitting up in bed. Feels much better overall. Feels less fluid overloaded. No pain.      Medications:  Reviewed    Infusion Medications    sodium chloride      dextrose       Scheduled Medications    carvedilol  25 mg Oral BID WC    lactulose        [Held by provider] insulin glargine  8 Units SubCUTAneous Daily    isosorbide mononitrate  30 mg Oral Nightly    isosorbide mononitrate  60 mg Oral Daily    Insulin Pump - Bolus Dose   SubCUTAneous 4x Daily AC & HS    Insulin Pump - Basal Dose   SubCUTAneous Daily    levETIRAcetam  500 mg Oral Daily    dianeal lo-miller 4.25%  2,000 mL IntraPERitoneal Q4H    [Held by provider] insulin lispro  0-4 Units SubCUTAneous Nightly    [Held by provider] insulin lispro  0-16 Units SubCUTAneous TID WC    sodium chloride flush  5-40 mL IntraVENous 2 times per day    sodium chloride flush  5-40 mL IntraVENous 2 times per day    [Held by provider] aspirin  81 mg Oral Daily    Or    [Held by provider] aspirin  300 mg Rectal Daily    hydrALAZINE  50 mg Oral TID    ranolazine  500 mg Oral BID

## 2024-07-20 NOTE — PROGRESS NOTES
Morphine for pain at 1215 followed by nausea, lightheadedness and Zofran at 1600. Poor PO diet. PD fluid dwelling since 1345.

## 2024-07-20 NOTE — PROGRESS NOTES
Occupational Therapy/Physical Therapy                                   Te Garay  Attempted to see pt for OT/PT treatment. Patient currently unavailable due to receiving dialysis. Will hold therapy at this time and will follow up with pt as schedule permits.     Thanks,   Anabela Cornelius OTR/L [id number]   MacKenzie Rylee PT, DPT [id number]    9:30 am: Second attempt to see pt for OT/PT treatment. Pt continues to be unavailable due to receiving dialysis. Per nursing waiting for dialysis to be unhooked, nursing does not know what time/when this will be completed.

## 2024-07-20 NOTE — PROGRESS NOTES
[facility] - Inpatient Rehabilitation Department   Phone: [phone]    Occupational Therapy    [] Initial Evaluation            [x] Daily Treatment Note         [] Discharge Summary      Patient: Te Garay   : 1965   MRN: [id number]   Date of Service:  2024    Admitting Diagnosis:  Acute CVA (cerebrovascular accident) (Formerly [facility])  Current Admission Summary: 59 y.o. male with significant past medical history of type 1 diabetes diagnosed when he was 11-year-old, ESRD on peritoneal dialysis for the last 4 to 5 years, coronary artery disease s/p CABG, carotid artery stenosis s/p stent placement in the right ICA, left foot infection hypertension, hyperlipidemia, nephrolithiasis, presented to the hospital this time with having woken up with sudden onset of headache chest pain, in the ED he was evaluated and showed dysarthria and left lower lobe extremity weakness.  He was seen by Dr. Hannon of the stroke team., was given tenelctapase.   Past Medical History:  has a past medical history of Angina at rest ([facility]), Cardiomyopathy ([facility]), Carotid artery stenosis, Charcot foot due to diabetes mellitus ([facility]), CHF (congestive heart failure) ([facility]), CKD (chronic kidney disease) stage 2, GFR 60-89 ml/min, Coronary artery disease, Coronary artery disease involving native heart with angina pectoris ([facility]), De Quervain thyroiditis, Diabetic peripheral neuropathy ([facility]), Diabetic polyneuropathy associated with type 1 diabetes mellitus ([facility]), Diastolic HF (heart failure) ([facility]), Essential hypertension, History of COVID-19, Hyperlipidemia, Hyperlipidemia with target LDL less than 70, Hyperparathyroidism due to renal insufficiency ([facility]), Hypertension goal BP (blood pressure) < 130/80, Peritonitis associated with peritoneal dialysis ([facility]), PVD (peripheral vascular disease) ([facility]), S/P CABG (coronary artery bypass graft), Seizure disorder ([facility]), Seizures ([facility]), Stenosis of carotid artery, Toe osteomyelitis, left ([facility]), and

## 2024-07-20 NOTE — PROGRESS NOTES
[facility] - Inpatient Rehabilitation Department   Phone: [phone]    Physical Therapy    [] Initial Evaluation            [x] Daily Treatment Note         [] Discharge Summary      Patient: Te Garay   : 1965   MRN: [id number]   Date of Service:  2024  Admitting Diagnosis: Acute CVA (cerebrovascular accident) ([facility])  Current Admission Summary: The patient is a 59 y.o. male with significant past medical history of type 1 diabetes diagnosed when he was 11-year-old, ESRD on peritoneal dialysis for the last 4 to 5 years, coronary artery disease s/p CABG, carotid artery stenosis s/p stent placement in the right ICA, left foot infection hypertension, hyperlipidemia, nephrolithiasis, presented to the hospital this time with having woken up with sudden onset of headache chest pain, in the ED he was evaluated and showed dysarthria and left lower lobe extremity weakness.  He was seen by Dr. Hannon of the stroke team., was given tenelctapase.  Past Medical History:  has a past medical history of Angina at rest ([facility]), Cardiomyopathy ([facility]), Carotid artery stenosis, Charcot foot due to diabetes mellitus ([facility]), CHF (congestive heart failure) ([facility]), CKD (chronic kidney disease) stage 2, GFR 60-89 ml/min, Coronary artery disease, Coronary artery disease involving native heart with angina pectoris ([facility]), De Quervain thyroiditis, Diabetic peripheral neuropathy ([facility]), Diabetic polyneuropathy associated with type 1 diabetes mellitus ([facility]), Diastolic HF (heart failure) ([facility]), Essential hypertension, History of COVID-19, Hyperlipidemia, Hyperlipidemia with target LDL less than 70, Hyperparathyroidism due to renal insufficiency ([facility]), Hypertension goal BP (blood pressure) < 130/80, Peritonitis associated with peritoneal dialysis ([facility]), PVD (peripheral vascular disease) ([facility]), S/P CABG (coronary artery bypass graft), Seizure disorder ([facility]), Seizures ([facility]), Stenosis of carotid artery, Toe osteomyelitis, left  (HCC), and Type 1 diabetes mellitus with chronic kidney disease (HCC).  Past Surgical History:  has a past surgical history that includes Cardiac surgery (2001); Coronary artery bypass graft; Cardiac catheterization; hernia repair; Tonsillectomy; LAPAROSCOPY INSERTION PERITONEAL CATHETER (N/A, 06/03/2020); Carotid stent placement (Right); Bladder surgery (Left, 08/24/2023); Foot Debridement (Left, 10/16/2023); Foot Debridement (Left, 10/23/2023); and Cardiac procedure (N/A, 6/27/2024).  Discharge Recommendations: Te Garay scored a 19/24 on the AM-PAC short mobility form. Current research shows that an AM-PAC score of 17 or less is typically not associated with a discharge to the patient's home setting. Based on the patient's AM-PAC score and their current functional mobility deficits, it is recommended that the patient have 5-7 sessions per week of Physical Therapy at d/c to increase the patient's independence.  At this time, this patient demonstrates complex nursing, medical, and rehabilitative needs, and would benefit from intensive rehabilitation services upon discharge from the Inpatient setting.  This patient demonstrates the ability to participate in and benefit from an intensive therapy program with a coordinated interdisciplinary team approach to foster frequent, structured, and documented communication among disciplines, who will work together to establish, prioritize, and achieve treatment goals. Please see assessment section for further patient specific details. If patient discharges prior to next session this note will serve as a discharge summary.  Please see below for the latest assessment towards goals.    DME Required For Discharge: no DME required at discharge  Precautions/Restrictions: high fall risk  Weight Bearing Restrictions: no restrictions  [] Right Upper Extremity  [] Left Upper Extremity [] Right Lower Extremity  [] Left Lower Extremity     Required Braces/Orthotics: surgical shoe   []

## 2024-07-20 NOTE — PROGRESS NOTES
PD Fluid drained after 3 hrs and 10 minutes. HD nurse to bedside to set up cycl er. HD nurse Rossy asked me not to replace PD Fluid after draining completed at 1730. She will be right over to start the cycl er.    Clofazimine Counseling:  I discussed with the patient the risks of clofazimine including but not limited to skin and eye pigmentation, liver damage, nausea/vomiting, gastrointestinal bleeding and allergy.

## 2024-07-20 NOTE — PROGRESS NOTES
[facility] Pulmonary/CCM Progress note      Admit Date: 7/16/2024    Chief Complaint: Dysphagia and left-sided hemiparesis    Subjective:     Interval History: Patient appears to be well today, working on his laptop.  No new events overnight.  Received manual exchanges during daytime and was placed on cycler at nighttime.-4.8 L output documented.  Patient is still on 2 L O2, but appears very comfortable with O2 sats of 99%.    Scheduled Meds:   carvedilol  25 mg Oral BID WC    lactulose        [Held by provider] insulin glargine  8 Units SubCUTAneous Daily    isosorbide mononitrate  30 mg Oral Nightly    isosorbide mononitrate  60 mg Oral Daily    Insulin Pump - Bolus Dose   SubCUTAneous 4x Daily AC & HS    Insulin Pump - Basal Dose   SubCUTAneous Daily    levETIRAcetam  500 mg Oral Daily    dianeal lo-miller 4.25%  2,000 mL IntraPERitoneal Q4H    [Held by provider] insulin lispro  0-4 Units SubCUTAneous Nightly    [Held by provider] insulin lispro  0-16 Units SubCUTAneous TID WC    sodium chloride flush  5-40 mL IntraVENous 2 times per day    sodium chloride flush  5-40 mL IntraVENous 2 times per day    [Held by provider] aspirin  81 mg Oral Daily    Or    [Held by provider] aspirin  300 mg Rectal Daily    hydrALAZINE  50 mg Oral TID    ranolazine  500 mg Oral BID    rosuvastatin  40 mg Oral QPM    sacubitril-valsartan  1 tablet Oral BID    pantoprazole  40 mg IntraVENous Daily     Continuous Infusions:   sodium chloride      dextrose       PRN Meds:melatonin, lactulose, lactulose, ipratropium 0.5 mg-albuterol 2.5 mg, nitroglycerin, sodium chloride, sodium chloride flush, ondansetron **OR** ondansetron, dextrose bolus **OR** dextrose bolus, glucagon (rDNA), dextrose, aluminum & magnesium hydroxide-simethicone, perflutren lipid microspheres, morphine    Review of Systems  Constitutional: Fatigue and malaise  Ears, nose, mouth, throat: negative for ear drainage, epistaxis, hoarseness, nasal congestion, sore throat and voice  change  Respiratory: negative except for dyspnea on exertion and shortness of breath  Cardiovascular: negative for chest pain, chest pressure/discomfort, irregular heart beat, lower extremity edema and palpitations  Gastrointestinal: negative for abdominal pain, constipation, diarrhea, jaundice, melena, odynophagia, reflux symptoms and vomiting  Hematologic/lymphatic: negative for bleeding, easy bruising, lymphadenopathy and petechiae  Musculoskeletal:negative for arthralgias, bone pain, muscle weakness, neck pain and stiff joints  Neurological: negative for dizziness, gait problems, headaches, seizures, speech problems, tremors and weakness  Behavioral/Psych: negative for anxiety, behavior problems, depression, fatigue and sleep disturbance  Endocrine: negative for diabetic symptoms including none, neuropathy, polyphagia, polyuria, polydipsia, vomiting and diarrhea and temperature intolerance  Allergic/Immunologic: negative for anaphylaxis, angioedema, hay fever and urticaria    Objective:     Patient Vitals for the past 8 hrs:   BP Temp Temp src Pulse Resp SpO2   07/20/24 1358 118/75 -- -- -- -- --   07/20/24 1215 118/75 -- -- 91 16 99 %   07/20/24 1200 109/88 97.1 °F (36.2 °C) Temporal 91 13 99 %   07/20/24 1108 -- -- -- (!) 102 20 98 %   07/20/24 0902 -- -- -- (!) 112 -- --   07/20/24 0838 127/85 97.2 °F (36.2 °C) Temporal (!) 110 23 100 %   07/20/24 0808 -- -- -- (!) 111 15 100 %   07/20/24 0800 (!) 167/95 -- -- (!) 108 13 --       I/O last 3 completed shifts:  In: 8680 [P.O.:480; Other:8200]  Out: 95009 [Urine:200; Other:41458]  I/O this shift:  In: 1900 [Other:1900]  Out: 20 [Other:20]    General Appearance: Awake, in no acute distress  Skin: warm and dry, no rash or erythema  Head: normocephalic and atraumatic  Eyes: pupils equal, round, and reactive to light, extraocular eye movements intact, conjunctivae normal  ENT: external ear and ear canal normal bilaterally, nose without deformity, nasal mucosa and

## 2024-07-20 NOTE — PROGRESS NOTES
Pt. Sitting up eating breakfast now. VSS, afebrile. Meds as ordered. Wife left for a little bit she will be back later. Patient still connected to PD cycl er. Once off the cycl er will drain and instill PD fluid as ordered. Plan for tow exchanges through the day shift then 3 exchanges through the HS per cycl er. Call light in reach.

## 2024-07-20 NOTE — PROGRESS NOTES
Treatment time: 12 Hours 12 minutes  Net UF: 1224 ml  Dwell Time: 46 minutes {Gained;     Treatment completed without complications or complaints from patient. Effluent clear and lines taped to patient per protocol. Patient resting comfortably with VSS upon exiting room.      Copy of dialysis treatment record placed in chart, to be scanned into EMR and report given to LAURA hester.

## 2024-07-21 PROBLEM — K59.00 CONSTIPATION: Status: RESOLVED | Noted: 2024-07-20 | Resolved: 2024-07-21

## 2024-07-21 PROBLEM — E87.5 HYPERKALEMIA: Status: RESOLVED | Noted: 2022-10-01 | Resolved: 2024-07-21

## 2024-07-21 PROBLEM — G25.3 MYOCLONUS: Status: RESOLVED | Noted: 2024-07-16 | Resolved: 2024-07-21

## 2024-07-21 PROBLEM — N18.6 ESRD (END STAGE RENAL DISEASE) (HCC): Status: RESOLVED | Noted: 2021-04-08 | Resolved: 2024-07-21

## 2024-07-21 PROBLEM — Z92.82 RECEIVED INTRAVENOUS TISSUE PLASMINOGEN ACTIVATOR (TPA) IN EMERGENCY DEPARTMENT: Status: RESOLVED | Noted: 2024-07-16 | Resolved: 2024-07-21

## 2024-07-21 PROBLEM — J81.0 ACUTE PULMONARY EDEMA (HCC): Status: RESOLVED | Noted: 2024-07-17 | Resolved: 2024-07-21

## 2024-07-21 LAB
ALBUMIN SERPL-MCNC: 3.2 G/DL (ref 3.4–5)
ANION GAP SERPL CALCULATED.3IONS-SCNC: 13 MMOL/L (ref 3–16)
BASOPHILS # BLD: 0 K/UL (ref 0–0.2)
BASOPHILS NFR BLD: 0.4 %
BUN SERPL-MCNC: 50 MG/DL (ref 7–20)
CALCIUM SERPL-MCNC: 8.3 MG/DL (ref 8.3–10.6)
CHLORIDE SERPL-SCNC: 95 MMOL/L (ref 99–110)
CO2 SERPL-SCNC: 28 MMOL/L (ref 21–32)
CREAT SERPL-MCNC: 9.8 MG/DL (ref 0.9–1.3)
DEPRECATED RDW RBC AUTO: 14.7 % (ref 12.4–15.4)
EOSINOPHIL # BLD: 0.2 K/UL (ref 0–0.6)
EOSINOPHIL NFR BLD: 4.9 %
GFR SERPLBLD CREATININE-BSD FMLA CKD-EPI: 6 ML/MIN/{1.73_M2}
GLUCOSE BLD-MCNC: 153 MG/DL (ref 70–99)
GLUCOSE BLD-MCNC: 225 MG/DL (ref 70–99)
GLUCOSE BLD-MCNC: 226 MG/DL (ref 70–99)
GLUCOSE SERPL-MCNC: 207 MG/DL (ref 70–99)
HCT VFR BLD AUTO: 33 % (ref 40.5–52.5)
HGB BLD-MCNC: 11 G/DL (ref 13.5–17.5)
LYMPHOCYTES # BLD: 1.3 K/UL (ref 1–5.1)
LYMPHOCYTES NFR BLD: 35 %
MCH RBC QN AUTO: 30.4 PG (ref 26–34)
MCHC RBC AUTO-ENTMCNC: 33.3 G/DL (ref 31–36)
MCV RBC AUTO: 91 FL (ref 80–100)
MONOCYTES # BLD: 0.6 K/UL (ref 0–1.3)
MONOCYTES NFR BLD: 16.3 %
NEUTROPHILS # BLD: 1.7 K/UL (ref 1.7–7.7)
NEUTROPHILS NFR BLD: 43.4 %
PERFORMED ON: ABNORMAL
PHOSPHATE SERPL-MCNC: 4.8 MG/DL (ref 2.5–4.9)
PLATELET # BLD AUTO: 171 K/UL (ref 135–450)
PMV BLD AUTO: 9.6 FL (ref 5–10.5)
POTASSIUM SERPL-SCNC: 4.3 MMOL/L (ref 3.5–5.1)
RBC # BLD AUTO: 3.62 M/UL (ref 4.2–5.9)
SODIUM SERPL-SCNC: 136 MMOL/L (ref 136–145)
WBC # BLD AUTO: 3.8 K/UL (ref 4–11)

## 2024-07-21 PROCEDURE — 6370000000 HC RX 637 (ALT 250 FOR IP)

## 2024-07-21 PROCEDURE — 6370000000 HC RX 637 (ALT 250 FOR IP): Performed by: CLINICAL NURSE SPECIALIST

## 2024-07-21 PROCEDURE — 94761 N-INVAS EAR/PLS OXIMETRY MLT: CPT

## 2024-07-21 PROCEDURE — 85025 COMPLETE CBC W/AUTO DIFF WBC: CPT

## 2024-07-21 PROCEDURE — 90945 DIALYSIS ONE EVALUATION: CPT

## 2024-07-21 PROCEDURE — 6370000000 HC RX 637 (ALT 250 FOR IP): Performed by: INTERNAL MEDICINE

## 2024-07-21 PROCEDURE — 99232 SBSQ HOSP IP/OBS MODERATE 35: CPT | Performed by: CLINICAL NURSE SPECIALIST

## 2024-07-21 PROCEDURE — 2580000003 HC RX 258: Performed by: HOSPITALIST

## 2024-07-21 PROCEDURE — 2580000003 HC RX 258: Performed by: CLINICAL NURSE SPECIALIST

## 2024-07-21 PROCEDURE — 6360000002 HC RX W HCPCS: Performed by: CLINICAL NURSE SPECIALIST

## 2024-07-21 PROCEDURE — 1200000000 HC SEMI PRIVATE

## 2024-07-21 PROCEDURE — 80069 RENAL FUNCTION PANEL: CPT

## 2024-07-21 PROCEDURE — 6370000000 HC RX 637 (ALT 250 FOR IP): Performed by: HOSPITALIST

## 2024-07-21 PROCEDURE — 6360000002 HC RX W HCPCS: Performed by: STUDENT IN AN ORGANIZED HEALTH CARE EDUCATION/TRAINING PROGRAM

## 2024-07-21 PROCEDURE — 6370000000 HC RX 637 (ALT 250 FOR IP): Performed by: STUDENT IN AN ORGANIZED HEALTH CARE EDUCATION/TRAINING PROGRAM

## 2024-07-21 RX ORDER — CLOPIDOGREL BISULFATE 75 MG/1
75 TABLET ORAL DAILY
Status: DISCONTINUED | OUTPATIENT
Start: 2024-07-21 | End: 2024-07-22 | Stop reason: HOSPADM

## 2024-07-21 RX ORDER — FUROSEMIDE 40 MG/1
40 TABLET ORAL 2 TIMES DAILY
Status: DISCONTINUED | OUTPATIENT
Start: 2024-07-21 | End: 2024-07-21

## 2024-07-21 RX ORDER — MIDODRINE HYDROCHLORIDE 5 MG/1
10 TABLET ORAL ONCE
Status: COMPLETED | OUTPATIENT
Start: 2024-07-21 | End: 2024-07-21

## 2024-07-21 RX ORDER — LEVETIRACETAM 500 MG/1
500 TABLET ORAL DAILY
Qty: 60 TABLET | Refills: 3 | Status: SHIPPED | OUTPATIENT
Start: 2024-07-22 | End: 2024-07-22

## 2024-07-21 RX ORDER — ASPIRIN 81 MG/1
81 TABLET ORAL DAILY
Qty: 90 TABLET | Refills: 0 | Status: SHIPPED | OUTPATIENT
Start: 2024-07-21 | End: 2024-07-22

## 2024-07-21 RX ADMIN — CLOPIDOGREL BISULFATE 75 MG: 75 TABLET ORAL at 08:18

## 2024-07-21 RX ADMIN — LEVETIRACETAM 500 MG: 250 TABLET, FILM COATED ORAL at 08:18

## 2024-07-21 RX ADMIN — MIDODRINE HYDROCHLORIDE 10 MG: 5 TABLET ORAL at 17:42

## 2024-07-21 RX ADMIN — ONDANSETRON 4 MG: 4 TABLET, ORALLY DISINTEGRATING ORAL at 08:46

## 2024-07-21 RX ADMIN — RANOLAZINE 500 MG: 500 TABLET, EXTENDED RELEASE ORAL at 21:01

## 2024-07-21 RX ADMIN — HYDRALAZINE HYDROCHLORIDE 50 MG: 25 TABLET ORAL at 08:18

## 2024-07-21 RX ADMIN — PANTOPRAZOLE SODIUM 40 MG: 40 INJECTION, POWDER, FOR SOLUTION INTRAVENOUS at 08:19

## 2024-07-21 RX ADMIN — ISOSORBIDE MONONITRATE 60 MG: 60 TABLET, EXTENDED RELEASE ORAL at 08:18

## 2024-07-21 RX ADMIN — CARVEDILOL 25 MG: 25 TABLET, FILM COATED ORAL at 08:18

## 2024-07-21 RX ADMIN — RANOLAZINE 500 MG: 500 TABLET, EXTENDED RELEASE ORAL at 08:18

## 2024-07-21 RX ADMIN — SACUBITRIL AND VALSARTAN 1 TABLET: 49; 51 TABLET, FILM COATED ORAL at 08:18

## 2024-07-21 RX ADMIN — SODIUM CHLORIDE, PRESERVATIVE FREE 10 ML: 5 INJECTION INTRAVENOUS at 21:02

## 2024-07-21 RX ADMIN — SODIUM CHLORIDE 200 MG: 9 INJECTION, SOLUTION INTRAVENOUS at 08:45

## 2024-07-21 RX ADMIN — ROSUVASTATIN CALCIUM 40 MG: 40 TABLET, COATED ORAL at 17:42

## 2024-07-21 RX ADMIN — SODIUM CHLORIDE, PRESERVATIVE FREE 10 ML: 5 INJECTION INTRAVENOUS at 08:19

## 2024-07-21 RX ADMIN — SODIUM CHLORIDE, PRESERVATIVE FREE 10 ML: 5 INJECTION INTRAVENOUS at 21:01

## 2024-07-21 ASSESSMENT — PAIN SCALES - GENERAL: PAINLEVEL_OUTOF10: 0

## 2024-07-21 NOTE — PROGRESS NOTES
Treatment initiated without complications or complaints from patient. Patient resting comfortably with VSS upon dialysis RN exit from room. LAURA fitzpatrick notified of start of treatment and hotline number located on top of machine for any questions about troubleshooting during after hours.

## 2024-07-21 NOTE — PROGRESS NOTES
Treatment time: 12 Hours 10 minutes  Net UF: 1983 ml  Dwell Time: 44 minutes {Gained    Treatment completed without complications or complaints from patient. Effluent clear and lines taped to patient per protocol. Patient resting comfortably with VSS upon exiting room.      Copy of dialysis treatment record placed in chart, to be scanned into EMR and report given to LAURA Myers.

## 2024-07-21 NOTE — PLAN OF CARE
Problem: Pain  Goal: Verbalizes/displays adequate comfort level or baseline comfort level  Outcome: Progressing  Flowsheets (Taken 7/20/2024 0000 by Adrianna Lynn RN)  Verbalizes/displays adequate comfort level or baseline comfort level:   Encourage patient to monitor pain and request assistance   Assess pain using appropriate pain scale   Notify Licensed Independent Practitioner if interventions unsuccessful or patient reports new pain   Implement non-pharmacological measures as appropriate and evaluate response   Administer analgesics based on type and severity of pain and evaluate response   Consider cultural and social influences on pain and pain management     Problem: Chronic Conditions and Co-morbidities  Goal: Patient's chronic conditions and co-morbidity symptoms are monitored and maintained or improved  Outcome: Progressing  Flowsheets (Taken 7/19/2024 2000 by Adrianna Lynn RN)  Care Plan - Patient's Chronic Conditions and Co-Morbidity Symptoms are Monitored and Maintained or Improved:   Monitor and assess patient's chronic conditions and comorbid symptoms for stability, deterioration, or improvement   Update acute care plan with appropriate goals if chronic or comorbid symptoms are exacerbated and prevent overall improvement and discharge   Collaborate with multidisciplinary team to address chronic and comorbid conditions and prevent exacerbation or deterioration     Problem: Neurosensory - Adult  Goal: Achieves maximal functionality and self care  Outcome: Progressing  Flowsheets (Taken 7/19/2024 2000 by Adrianna Lynn RN)  Achieves maximal functionality and self care:   Monitor swallowing and airway patency with patient fatigue and changes in neurological status   Encourage and assist patient to increase activity and self care with guidance from physical therapy/occupational therapy   Encourage visually impaired, hearing impaired and aphasic patients to use assistive/communication devices

## 2024-07-21 NOTE — PROGRESS NOTES
[facility]   Nephrology progress Note  [phone]  [phone]   [employer]    Reason for Consult:  ESRD on PD   The patient is a 59 y.o. male with significant past medical history of type 1 diabetes diagnosed when he was 11-year-old, ESRD on peritoneal dialysis for the last 4 to 5 years, coronary artery disease s/p CABG, carotid artery stenosis s/p stent placement in the right ICA, left foot infection hypertension, hyperlipidemia, nephrolithiasis, presented to the hospital this time with having woken up with sudden onset of headache chest pain, in the ED he was evaluated and showed dysarthria and left lower lobe extremity weakness.  He was seen by Dr. Hannon of the stroke team., was given tenelctapase      Interval  History:  7/17/24: there were issues with peritoneal dialysis last night.  The machine kept alarming.  The staff could not troubleshoot it and dialysis was stopped.   This morning  morning and was short of breath the patient more short of breath and his potassium level at 808 16 was 6.4 with a creatinine of 7.8 and a bun of 54.  Blood sugar was elevated at 356 so it was felt that part of the rise in potassium could be due to the hypoglycemia.  The hyperglycemia was treated and the repeat potassium was 5.5 at 10:54 AM  Patient is constipated  7/18/24: lying in bed , wife  by bedside  7/19/24: Doing manual exchanges , last night he had not drained fully from his manual when a PD was started plus he had 500+ fluid in his urinary bladder, this made him very uncomfortable.  A Real catheter was placed and the PD fluid was drained out totally and then he was continued on manual exchanges  Feels a lot better now less distended less short of breath  7/20/24 Will spread manual exchanges evenly throughout time when he is not on cycler.  Breathing better.  No CP.    7/21/24: O > I but weight unchanged?  Edema is improving. Breathing comfortably.  Instructed patient to not eat the

## 2024-07-21 NOTE — PROGRESS NOTES
Hospitalist Progress Note    Name:  Te Garay    /Age/Sex: 1965  (59 y.o. male)  MRN & CSN:  [id number] & [id number]    PCP: Reid Lei MD    Date of Admission: 2024    Patient Status:  Inpatient     Chief Complaint:   Chief Complaint   Patient presents with    Headache     PT arrived [facility] from home pt receives dialysis at home when he suddenly began to act abnormal according to the wife. Pt states he has left foot pain and a severe headache and feels dehydrated.        Hospital Course:   Admitted for possible CVA. Given TNK at 0500 on . Neurology consulted.    All neurological imaging has been negative.    ESRD patient on PD. Nephrology consulted. Did not have PD performed on , which caused electrolyte dysfunction and some additional fluid overload.     Subjective:  Today is:  Hospital Day: 6.  Patient seen and examined in ICU-3911/3911-.     Lying in bed. More fatigued today. Denies CP or SOB.       Medications:  Reviewed    Infusion Medications    sodium chloride      dextrose       Scheduled Medications    clopidogrel  75 mg Oral Daily    carvedilol  25 mg Oral BID WC    [Held by provider] insulin glargine  8 Units SubCUTAneous Daily    isosorbide mononitrate  30 mg Oral Nightly    isosorbide mononitrate  60 mg Oral Daily    Insulin Pump - Bolus Dose   SubCUTAneous 4x Daily AC & HS    Insulin Pump - Basal Dose   SubCUTAneous Daily    levETIRAcetam  500 mg Oral Daily    dianeal lo-miller 4.25%  2,000 mL IntraPERitoneal Q4H    [Held by provider] insulin lispro  0-4 Units SubCUTAneous Nightly    [Held by provider] insulin lispro  0-16 Units SubCUTAneous TID WC    sodium chloride flush  5-40 mL IntraVENous 2 times per day    sodium chloride flush  5-40 mL IntraVENous 2 times per day    [Held by provider] aspirin  81 mg Oral Daily    Or    [Held by provider] aspirin  300 mg Rectal Daily    hydrALAZINE  50 mg Oral TID    ranolazine  500 mg Oral BID    rosuvastatin  40 mg Oral       CT HEAD WO CONTRAST   Final Result   No acute intracranial abnormality.      Findings were discussed with VERONICA Goldman by Paola Wallace on   07/16/2024 at 4:13 a.m.                 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 male who presented to [facility] on 7/16/2024 and is being treated for:    CVA - resolved  -TNK given at 0500 on 7/16  -All imaging non acute  -Statin and aspirin  -TIA/CVA order set ordered  -Neurology signed off     Hypertension  -Continue home antihypertensives  -Continue home Imdur     ESRD on dialysis  -On peritoneal dialysis; continue  -Patient will eventually need his PD catheter replaced  -Continue lasix  -I/O  -Patient does make urine  -Avoid nephrotoxins  -Nephrology consulted    Hyperlipidemia  -Continue home statin    Type 1 diabetes  -Continue home insulin pump     Seizures  -Continue keppra     HFrEF  -Not in acute exacerbation  -Echo 7/16/24 showed LVEF 15-20% with severe global hypokinesis  -Continue Coreg and Entresto  -Continue home Lasix  -I/Os      Discussed management of the case with nephrology who recommended PD    Drugs that require monitoring for toxicity include: Subq Insulin and the method of monitoring was/is BG    DVT ppx: Contraindicated  GI ppx: Diet/Tube Feeds  Diet: ADULT DIET; Regular; 4 carb choices

## 2024-07-21 NOTE — PROGRESS NOTES
[facility]   Daily Progress Note      Admit Date:  7/16/2024    HPI:    Mr. Garay is a 59 year old male with history of CAD status post CABG with stenosis in his SVG-OM, being evaluated for kidney transplant on peritoneal dialysis, status post stent placement in right ICA, hypertension, hyperlipidemia     He presented to the hospital with altered mental status and not diagnosed with definitive stroke with tenelctapase and improvement.  He was having incomplete peritoneal dialysis session due to catheter dysfunction.  Echo with LVEF 15-20%      Subjective:  Patient is being seen for chest pain. There were no acute overnight cardiac events. He took a melatonin last night and his PD was started late.  Sleepy today.  He still has not had a BM (only one dose of lactulose).  PD is working correctly and no chest pain.  HR improved with coreg and is nsr    Creat 9.8 bnp 43K tsh is normal    Objective:   /66   Pulse 100   Temp 97.6 °F (36.4 °C) (Temporal)   Resp 13   Ht 1.727 m (5' 8\")   Wt 89.3 kg (196 lb 13.9 oz)   SpO2 97%   BMI 29.93 kg/m²     Intake/Output Summary (Last 24 hours) at 7/21/2024 0808  Last data filed at 7/20/2024 2000  Gross per 24 hour   Intake 2020 ml   Output 1970 ml   Net 50 ml          Physical Exam:  General:  Awake, alert, oriented in NAD  Skin:  Warm and dry.  No unusual bruising or rash  Neck:  Supple.  No JVD or carotid bruit appreciated  Chest:  Normal effort.  Clear to auscultation, no wheezes/rhonchi/rales  Cardiovascular:  RRR, S1/S2, no murmur/gallop/rub  Abdomen:  Soft, nontender, +bowel sounds, PD catheter  Extremities:  No edema  Neurological: No focal deficits  Psychological: Normal mood and affect      Medications:    clopidogrel  75 mg Oral Daily    carvedilol  25 mg Oral BID WC    [Held by provider] insulin glargine  8 Units SubCUTAneous Daily    isosorbide mononitrate  30 mg Oral Nightly    isosorbide mononitrate  60 mg Oral Daily    Insulin Pump -  Left Ventricle: Moderately reduced left ventricular systolic function with a visually estimated EF of 35 - 40%. Left ventricle is dilated. Normal wall thickness. Moderate hypokinesis of the following segments: basal anterior, basal anterolateral, basal inferolateral, mid anterior, mid anterolateral, apical anterior and apical lateral. Akinesis of the following segments: basal anteroseptal, basal inferior and basal inferoseptal. Normal diastolic function.    Right Ventricle: Not well visualized. Right ventricle size is normal.    Mitral Valve: Moderate annular calcification at the posterior leaflet. Mildly thickened leaflets. Mild regurgitation.    Interatrial Septum: Agitated saline study was negative with and without provocation.    Image quality is technically difficult. Contrast used: Definity. Procedure performed with the patient in a sitting position. Patient in immense amount of pain during exam.     Cardiac cath 6/27/2024:  ANGIOGRAM/CORONARY ARTERIOGRAM:        The left main coronary artery has distal LM disease 40%.     The left anterior descending artery has an ostial flush in stent occlusion.  D1 has moderate 40% diffuse disease filled retrograde from LIMA     The left circumflex artery has a mid vessel  filled via L-L and R-L collaterals.  OM1 is ostially      The right coronary artery is dominant vessel with diffuse moderate 30-40% disease.  RPDA has luminal irregularities     SVG-OM1 has a 90% segment of in stent restenosis mid stent segment as well as at vessel touchdown     LIMA-LAD widely patent        RECOMMENDATION:    - Extensive discussion with family, recommend aggressive medical management, vessel is high risk for restenosis and elevated risk for PCI - would require laser atherectomy     Assessment:    1.  Acute CVA neurology following  2.  CAD status post CABG with elevated risk for PCI of SVG-OM  3.  ESRD on peritoneal dialysis (initially PD catheter malfunctioned)  4.  Chronic

## 2024-07-21 NOTE — PROGRESS NOTES
Assessment completed. Patient sitting up in bed playing games on his laptop. Ate about 50% dinner. Denies complaint or need at this time. VSS. Call light reinforced for needs. V/u.

## 2024-07-22 VITALS
TEMPERATURE: 97.6 F | HEIGHT: 68 IN | HEART RATE: 95 BPM | BODY MASS INDEX: 30.24 KG/M2 | RESPIRATION RATE: 24 BRPM | WEIGHT: 199.52 LBS | OXYGEN SATURATION: 94 % | SYSTOLIC BLOOD PRESSURE: 115 MMHG | DIASTOLIC BLOOD PRESSURE: 78 MMHG

## 2024-07-22 LAB
ALBUMIN SERPL-MCNC: 3.4 G/DL (ref 3.4–5)
ANION GAP SERPL CALCULATED.3IONS-SCNC: 17 MMOL/L (ref 3–16)
BASOPHILS # BLD: 0 K/UL (ref 0–0.2)
BASOPHILS NFR BLD: 0.5 %
BUN SERPL-MCNC: 52 MG/DL (ref 7–20)
CALCIUM SERPL-MCNC: 8.7 MG/DL (ref 8.3–10.6)
CHLORIDE SERPL-SCNC: 94 MMOL/L (ref 99–110)
CO2 SERPL-SCNC: 24 MMOL/L (ref 21–32)
CREAT SERPL-MCNC: 10 MG/DL (ref 0.9–1.3)
DEPRECATED RDW RBC AUTO: 14.7 % (ref 12.4–15.4)
EOSINOPHIL # BLD: 0.2 K/UL (ref 0–0.6)
EOSINOPHIL NFR BLD: 4.6 %
GFR SERPLBLD CREATININE-BSD FMLA CKD-EPI: 6 ML/MIN/{1.73_M2}
GLUCOSE BLD-MCNC: 203 MG/DL (ref 70–99)
GLUCOSE BLD-MCNC: 255 MG/DL (ref 70–99)
GLUCOSE SERPL-MCNC: 355 MG/DL (ref 70–99)
HCT VFR BLD AUTO: 34.5 % (ref 40.5–52.5)
HGB BLD-MCNC: 11.4 G/DL (ref 13.5–17.5)
LYMPHOCYTES # BLD: 1.5 K/UL (ref 1–5.1)
LYMPHOCYTES NFR BLD: 34.9 %
MCH RBC QN AUTO: 29.9 PG (ref 26–34)
MCHC RBC AUTO-ENTMCNC: 33 G/DL (ref 31–36)
MCV RBC AUTO: 90.7 FL (ref 80–100)
MONOCYTES # BLD: 0.7 K/UL (ref 0–1.3)
MONOCYTES NFR BLD: 15.7 %
NEUTROPHILS # BLD: 1.9 K/UL (ref 1.7–7.7)
NEUTROPHILS NFR BLD: 44.3 %
PERFORMED ON: ABNORMAL
PERFORMED ON: ABNORMAL
PHOSPHATE SERPL-MCNC: 4.4 MG/DL (ref 2.5–4.9)
PLATELET # BLD AUTO: 184 K/UL (ref 135–450)
PMV BLD AUTO: 10.3 FL (ref 5–10.5)
POTASSIUM SERPL-SCNC: 4.1 MMOL/L (ref 3.5–5.1)
RBC # BLD AUTO: 3.81 M/UL (ref 4.2–5.9)
SODIUM SERPL-SCNC: 135 MMOL/L (ref 136–145)
WBC # BLD AUTO: 4.3 K/UL (ref 4–11)

## 2024-07-22 PROCEDURE — 80069 RENAL FUNCTION PANEL: CPT

## 2024-07-22 PROCEDURE — 6370000000 HC RX 637 (ALT 250 FOR IP): Performed by: STUDENT IN AN ORGANIZED HEALTH CARE EDUCATION/TRAINING PROGRAM

## 2024-07-22 PROCEDURE — 6370000000 HC RX 637 (ALT 250 FOR IP): Performed by: HOSPITALIST

## 2024-07-22 PROCEDURE — 97530 THERAPEUTIC ACTIVITIES: CPT

## 2024-07-22 PROCEDURE — 94150 VITAL CAPACITY TEST: CPT

## 2024-07-22 PROCEDURE — 99233 SBSQ HOSP IP/OBS HIGH 50: CPT | Performed by: NURSE PRACTITIONER

## 2024-07-22 PROCEDURE — 2580000003 HC RX 258: Performed by: HOSPITALIST

## 2024-07-22 PROCEDURE — 94761 N-INVAS EAR/PLS OXIMETRY MLT: CPT

## 2024-07-22 PROCEDURE — 6360000002 HC RX W HCPCS: Performed by: STUDENT IN AN ORGANIZED HEALTH CARE EDUCATION/TRAINING PROGRAM

## 2024-07-22 PROCEDURE — 97116 GAIT TRAINING THERAPY: CPT

## 2024-07-22 PROCEDURE — 85025 COMPLETE CBC W/AUTO DIFF WBC: CPT

## 2024-07-22 PROCEDURE — 6370000000 HC RX 637 (ALT 250 FOR IP)

## 2024-07-22 RX ORDER — ASPIRIN 81 MG/1
81 TABLET ORAL DAILY
Qty: 90 TABLET | Refills: 0 | Status: SHIPPED | OUTPATIENT
Start: 2024-07-22

## 2024-07-22 RX ORDER — SENNA AND DOCUSATE SODIUM 50; 8.6 MG/1; MG/1
2 TABLET, FILM COATED ORAL DAILY PRN
Status: DISCONTINUED | OUTPATIENT
Start: 2024-07-22 | End: 2024-07-22 | Stop reason: HOSPADM

## 2024-07-22 RX ORDER — CARVEDILOL 6.25 MG/1
12.5 TABLET ORAL 2 TIMES DAILY WITH MEALS
Status: DISCONTINUED | OUTPATIENT
Start: 2024-07-22 | End: 2024-07-22 | Stop reason: HOSPADM

## 2024-07-22 RX ORDER — CARVEDILOL 12.5 MG/1
12.5 TABLET ORAL 2 TIMES DAILY WITH MEALS
Qty: 60 TABLET | Refills: 3 | Status: SHIPPED | OUTPATIENT
Start: 2024-07-22

## 2024-07-22 RX ORDER — LEVETIRACETAM 500 MG/1
500 TABLET ORAL DAILY
Qty: 60 TABLET | Refills: 3 | Status: SHIPPED | OUTPATIENT
Start: 2024-07-22

## 2024-07-22 RX ORDER — CARVEDILOL 12.5 MG/1
12.5 TABLET ORAL 2 TIMES DAILY WITH MEALS
Qty: 60 TABLET | Refills: 3 | Status: SHIPPED | OUTPATIENT
Start: 2024-07-22 | End: 2024-07-22

## 2024-07-22 RX ADMIN — LEVETIRACETAM 500 MG: 250 TABLET, FILM COATED ORAL at 08:43

## 2024-07-22 RX ADMIN — SODIUM CHLORIDE, PRESERVATIVE FREE 10 ML: 5 INJECTION INTRAVENOUS at 08:43

## 2024-07-22 RX ADMIN — SODIUM CHLORIDE, PRESERVATIVE FREE 10 ML: 5 INJECTION INTRAVENOUS at 08:44

## 2024-07-22 RX ADMIN — LACTULOSE 20 G: 10 SOLUTION ORAL at 08:57

## 2024-07-22 RX ADMIN — PANTOPRAZOLE SODIUM 40 MG: 40 INJECTION, POWDER, FOR SOLUTION INTRAVENOUS at 08:43

## 2024-07-22 RX ADMIN — RANOLAZINE 500 MG: 500 TABLET, EXTENDED RELEASE ORAL at 08:43

## 2024-07-22 RX ADMIN — CLOPIDOGREL BISULFATE 75 MG: 75 TABLET ORAL at 08:43

## 2024-07-22 ASSESSMENT — PAIN DESCRIPTION - FREQUENCY: FREQUENCY: CONTINUOUS

## 2024-07-22 ASSESSMENT — PAIN DESCRIPTION - PAIN TYPE: TYPE: ACUTE PAIN

## 2024-07-22 ASSESSMENT — PAIN DESCRIPTION - DESCRIPTORS: DESCRIPTORS: OTHER (COMMENT)

## 2024-07-22 ASSESSMENT — PAIN SCALES - GENERAL
PAINLEVEL_OUTOF10: 0
PAINLEVEL_OUTOF10: 0
PAINLEVEL_OUTOF10: 3

## 2024-07-22 ASSESSMENT — PAIN DESCRIPTION - ORIENTATION: ORIENTATION: RIGHT

## 2024-07-22 ASSESSMENT — PAIN - FUNCTIONAL ASSESSMENT: PAIN_FUNCTIONAL_ASSESSMENT: ACTIVITIES ARE NOT PREVENTED

## 2024-07-22 ASSESSMENT — PAIN DESCRIPTION - LOCATION: LOCATION: SHOULDER

## 2024-07-22 NOTE — PROGRESS NOTES
Incentive Spirometry education and demonstration completed by Respiratory Therapy Yes      Response to education: Excellent     Teaching Time: 15 minutes    Minimum Predicted Vital Capacity - 684 mL.  Patient's Actual Vital Capacity - 1500 mL. Turning over to Nursing for routine follow-up Yes.    Comments: IS goal met    Electronically signed by Leonardo Norman RCP on 7/22/2024 at 10:51 AM

## 2024-07-22 NOTE — PROGRESS NOTES
Treatment initiated without complications or complaints from patient. Patient resting comfortably with VSS upon dialysis RN exit from room. LAURA Rodas notified of start of treatment and hotline number located on top of machine for any questions about troubleshooting during after hours.

## 2024-07-22 NOTE — DISCHARGE INSTR - DIET
Good nutrition is important when healing from an illness, injury, or surgery.  Follow any nutrition recommendations given to you during your hospital stay.   If you were given an oral nutrition supplement while in the hospital, continue to take this supplement at home.  You can take it with meals, in-between meals, and/or before bedtime. These supplements can be purchased at most local grocery stores, pharmacies, and chain [employer]-stores.   If you have any questions about your diet or nutrition, call the hospital and ask for the dietitian.    Carb controlled diet- 4 carbs (60 grams)/meal

## 2024-07-22 NOTE — DIALYSIS
Treatment time: 12 Hours 13 minutes  Net UF: 1218 ml  Dwell Time: 50 minutes gained    Treatment completed without complications or complaints from patient. Effluent clear/yellow and lines taped to patient per protocol. Patient resting comfortably with VSS upon exiting room.      Copy of dialysis treatment record placed in chart, to be scanned into EMR and report given to Anuradha Rodriguez RN.

## 2024-07-22 NOTE — PROGRESS NOTES
[facility]   Cardiology Progress Note     Date: 7/22/2024  Admit Date: 7/16/2024     Reason for consultation:     Chief Complaint   Patient presents with    Headache     PT arrived [facility] from home pt receives dialysis at home when he suddenly began to act abnormal according to the wife. Pt states he has left foot pain and a severe headache and feels dehydrated.        History of Present Illness: History obtained from patient and medical record.     Te Garay is a 59 y.o. male was admitted with altered neurologic status but not diagnosed with definitive stroke. He did receive TNK. Neurologic deficits have improved and are close to baseline again. He has known CAD s/p CABG with known ischemic cardiomyopathy. He does have significant stenosis in his SVG-OM. He is currently undergoing evaluation for kidney transplant candidacy. During the current admission he had developed worsening chest discomfort which is persistent for 2 to 3 days. Ultimately he, along with medical staff, figured out that it was likely fluid retention due to missed (1) or incomplete peritoneal dialysis sessions due to dialysis catheter dysfunction. He is now feeling much better. He also recalls having chest discomfort since, similar, during times of volume overload. Serial echoes obtained suggest reduction in the most recent LV systolic function in comparison to the prior.  (per consult note)    Interval Hx: Today, he is feeling ok. Had twinge in chest overnight. Walked with therapy today without any exertion symptoms. Felt fatigued but overall felt he tolerated well. HF meds now on hold d/t low BP. Tele -ST.     Patient seen and examined. Clinical notes reviewed. Telemetry reviewed.  No new complaints today. No major events overnight.   Denies having chest pain, palpitations, shortness of breath, orthopnea/PND, cough, or dizziness at the time of this visit.      Past Medical History:  Past Medical History:   Diagnosis Date  they have been removing significant amounts of fluid with PD. Plan for less removal tonight. Restart half BB dose with parameters. Plan to restart others when BP allows.     ESRD  ~ PD  ~ undergoing evaluation for kidney transplant candidacy   ~ per nephrology     HTN  HLD  T1DM    Multiple medical conditions with risk of decompensation.   All pertinent information and plan of care discussed with the physician.  All questions and concerns were addressed to the patient. Alternatives to my treatment were discussed. I have discussed the above stated plan with patient and spouse and the nurse. The patient and spouse verbalized understanding and agreed with the plan.    Thank you for allowing to us to participate in the care of Te ZENA Garay.    Total visit time > 55 minutes; > 50% spend counseling / coordinating care. I reviewed interval history, physical exam, review of data including labs, imaging, development and implementation of treatment plan and coordination of complex care. Counseled on risk factor modifications.     Shania YORK-CNP  [facility]   Office: [phone]    NOTE:  This report was transcribed using voice recognition software.  Every effort was made to ensure accuracy; however, inadvertent computerized transcription errors may be present.

## 2024-07-22 NOTE — DISCHARGE INSTR - COC
Continuity of Care Form    Patient Name: Te Garay   :  1965  MRN:  [id number]    Admit date:  2024  Discharge date:  24    Code Status Order: Full Code   Advance Directives:     Admitting Physician:  Amandeep Leos DO  PCP: Reid Lei MD    Discharging Nurse: JAKE Rodriguez RN  Discharging Hospital Unit/Room#: ICU-3911/3911-01  Discharging Unit Phone Number: [phone]    Emergency Contact:   Extended Emergency Contact Information  Primary Emergency Contact: Sachi Garay  Address: [address] [place] [address]  Home Phone: [phone]  Mobile Phone: [phone]  Relation: Spouse  Secondary Emergency Contact: Kendell Rodriguez  Home Phone: [phone]  Relation: Child    Past Surgical History:  Past Surgical History:   Procedure Laterality Date    BLADDER SURGERY Left 2023    CYSTOSCOPY, LEFT URETEROSCOPY, STONE BASKET MANIPULATION, PLACEMENT OF LEFT URETERAL STENT performed by Chevy Sepulveda MD at [facility] OR    CARDIAC CATHETERIZATION      CARDIAC PROCEDURE N/A 2024    Left and right heart w coronary bypass graft performed by David Parish MD at [facility] CARDIAC CATH LAB    CARDIAC SURGERY      triple bypass at     CAROTID STENT PLACEMENT Right     CORONARY ARTERY BYPASS GRAFT      FOOT DEBRIDEMENT Left 10/16/2023    LEFT FOOT DEBRIDEMENT INCISION AND DRAINAGE WITH BONE BIOPSY performed by David Lowe DPM at [facility] OR    FOOT DEBRIDEMENT Left 10/23/2023    LEFT FOOT INCISION AND DRAINAGE WITH DELAYED PRIMARY CLOSURE AND DERMAL GRAFT APPLICATION performed by David Lowe DPM at [facility] OR    HERNIA REPAIR      infant    LAPAROSCOPY INSERTION PERITONEAL CATHETER N/A 2020    LAPAROSCOPIC PLACEMENT OF PERITONEAL DIALYSIS  CATHETER performed by Tristen Palacios MD at [facility] OR    TONSILLECTOMY         Immunization History:   Immunization History   Administered Date(s) Administered    DT (pediatric)

## 2024-07-22 NOTE — CARE COORDINATION
Case Management -  Discharge Note      Patient Name: Te Garay                   YOB: 1965            Readmission Risk (Low < 19, Mod (19-27), High > 27): Readmission Risk Score: 34.6    Current PCP: Reid Lei MD    ([facility]) Important Message from Medicare:    Date: 7/22/24    PT AM-PAC: 20 /24  OT AM-PAC: 18 /24    Patient/patient representative has been educated on the benefits of Home health Care as well as the possible risks of declining recommended services. Patient/patient representative has acknowledged the information provided and decided on the following discharge plan. Patient/ patient representative has been provided freedom of choice regarding service provider, supported by basic dialogue that supports the patient's individualized plan of care/goals.    Home Care Information:   Is patient resuming current home health care services: Yes -     Home Care Agency:   [facility]  [address] [place] [address] [address],   [place] [address]  Phone: [phone]  Fax: [phone]              Services: PT, OT, SN  Home Health Order Obtained: yes    Home health agency notified of discharge. Salomon      Financial    Payor: MEDICARE / Plan: MEDICARE PART A AND B / Product Type: *No Product type* /     Pharmacy:  Potential assistance Purchasing Medications: No  Meds-to-Beds request:        [facility] - [place], [place] - [address] - P [phone] - F [phone]  [address]  [address]  [facility] [address]  Phone: [phone] Fax: [phone]    [id number] [place] [place] [facility] [address] [facility] [phone] - F [phone]  [address] [facility] [address]  Phone: [phone] Fax: [phone]    [facility] #[id number] - [place], [place] - [address] [facility] - P [phone] - F [phone]  [address] [facility] [address]  Phone: [phone] Fax: [phone]      Notes:    Additional Case Management Notes: jacob steel

## 2024-07-22 NOTE — CARE COORDINATION
Discharge Planning Note:    Plan is for patient to return home and resume HHC services with [facility].     CM to request HHC order at [facility] for PT, OT, SN.     Electronically signed by Eunice Gordon RN on 7/22/2024 at 1:18 PM'

## 2024-07-22 NOTE — PROGRESS NOTES
[facility]   Nephrology progress Note  [phone]  [phone]   [email or web address]    Reason for Consult:  ESRD on PD   The patient is a 59 y.o. male with significant past medical history of type 1 diabetes diagnosed when he was 11-year-old, ESRD on peritoneal dialysis for the last 4 to 5 years, coronary artery disease s/p CABG, carotid artery stenosis s/p stent placement in the right ICA, left foot infection hypertension, hyperlipidemia, nephrolithiasis, presented to the hospital this time with having woken up with sudden onset of headache chest pain, in the ED he was evaluated and showed dysarthria and left lower lobe extremity weakness.  He was seen by Dr. Hannon of the stroke team., was given tenelctapase      Interval  History:  7/17/24: there were issues with peritoneal dialysis last night.  The machine kept alarming.  The staff could not troubleshoot it and dialysis was stopped.   This morning  morning and was short of breath the patient more short of breath and his potassium level at 808 16 was 6.4 with a creatinine of 7.8 and a bun of 54.  Blood sugar was elevated at 356 so it was felt that part of the rise in potassium could be due to the hypoglycemia.  The hyperglycemia was treated and the repeat potassium was 5.5 at 10:54 AM  Patient is constipated  7/18/24: lying in bed , wife  by bedside  7/19/24: Doing manual exchanges , last night he had not drained fully from his manual when a PD was started plus he had 500+ fluid in his urinary bladder, this made him very uncomfortable.  A Real catheter was placed and the PD fluid was drained out totally and then he was continued on manual exchanges  Feels a lot better now less distended less short of breath  7/22/24: Much better, off oxygen as of yesterday, back on automated nocturnal PD from today, constipated  No weakness leg speech normal    PHYSICAL EXAM:    Vitals:    /78   Pulse 96   Temp 97.3 °F (36.3 °C) (Temporal)   Resp 16    Ht 1.727 m (5' 8\")   Wt 90.5 kg (199 lb 8.3 oz)   SpO2 94%   BMI 30.34 kg/m²   I/O last 3 completed shifts:  In: 120 [P.O.:120]  Out: 1100 [Urine:1100]  No intake/output data recorded.    Physical Exam:  Gen:  alert, oriented x 3  PERRL , EOM +  Pallor +, No icterus  NC - oxygen   JVP not raised   CV: RRR no murmur or rub .Mid sternal scar of CABG   Lungs:B/ L air entry, Normal breath sounds ,, no crackles heard  Abd: soft, bowel sounds + , No organomegaly . PD catheter  Ext: Leg  edema  less, no cyanosis  Skin: Warm.  No rash  Neuro: No focal deficit      DATA:    CBC with Differential:    Lab Results   Component Value Date/Time    WBC 4.3 07/22/2024 04:23 AM    RBC 3.81 07/22/2024 04:23 AM    HGB 11.4 07/22/2024 04:23 AM    HCT 34.5 07/22/2024 04:23 AM     07/22/2024 04:23 AM    MCV 90.7 07/22/2024 04:23 AM    MCH 29.9 07/22/2024 04:23 AM    MCHC 33.0 07/22/2024 04:23 AM    RDW 14.7 07/22/2024 04:23 AM    LYMPHOPCT 34.9 07/22/2024 04:23 AM    MONOPCT 15.7 07/22/2024 04:23 AM    EOSPCT 4.6 07/22/2024 04:23 AM    BASOPCT 0.5 07/22/2024 04:23 AM    MONOSABS 0.7 07/22/2024 04:23 AM    LYMPHSABS 1.5 07/22/2024 04:23 AM    EOSABS 0.2 07/22/2024 04:23 AM    BASOSABS 0.0 07/22/2024 04:23 AM    DIFFTYPE Scan-K 06/15/2013 01:00 AM     CMP:    Lab Results   Component Value Date/Time     07/22/2024 04:23 AM    K 4.1 07/22/2024 04:23 AM    K 5.2 07/16/2024 07:35 AM    CL 94 07/22/2024 04:23 AM    CO2 24 07/22/2024 04:23 AM    BUN 52 07/22/2024 04:23 AM    CREATININE 10.0 07/22/2024 04:23 AM    GFRAA 9 10/03/2022 04:45 AM    GFRAA 44 06/15/2013 01:00 AM    AGRATIO 1.1 07/16/2024 07:35 AM    LABGLOM 6 07/22/2024 04:23 AM    LABGLOM 6 04/30/2024 04:51 AM    GLUCOSE 355 07/22/2024 04:23 AM    CALCIUM 8.7 07/22/2024 04:23 AM    BILITOT <0.2 07/16/2024 07:35 AM    ALKPHOS 91 07/16/2024 07:35 AM    AST 15 07/16/2024 07:35 AM    ALT 19 07/16/2024 07:35 AM     Phosphorus:    Lab Results   Component Value Date/Time

## 2024-07-22 NOTE — PLAN OF CARE
Problem: Discharge Planning  Goal: Discharge to home or other facility with appropriate resources  Outcome: Progressing     Problem: Chronic Conditions and Co-morbidities  Goal: Patient's chronic conditions and co-morbidity symptoms are monitored and maintained or improved  Outcome: Progressing     Problem: Cardiovascular - Adult  Goal: Maintains optimal cardiac output and hemodynamic stability  Outcome: Progressing  Flowsheets (Taken 7/21/2024 2000)  Maintains optimal cardiac output and hemodynamic stability: Monitor blood pressure and heart rate  Goal: Absence of cardiac dysrhythmias or at baseline  Outcome: Progressing  Flowsheets (Taken 7/21/2024 2000)  Absence of cardiac dysrhythmias or at baseline:   Monitor cardiac rate and rhythm   Assess for signs of decreased cardiac output   Administer antiarrhythmia medication and electrolyte replacement as ordered     Problem: Musculoskeletal - Adult  Goal: Return mobility to safest level of function  Outcome: Progressing  Goal: Maintain proper alignment of affected body part  Outcome: Progressing  Goal: Return ADL status to a safe level of function  Outcome: Progressing

## 2024-07-22 NOTE — CARE COORDINATION
07/22/24 1316   IMM Letter   IMM Letter given to Patient/Family/Significant other/Guardian/POA/by: Eunice Gordon RN, CM   IMM Letter date given: 07/22/24   IMM Letter time given: 1316

## 2024-07-22 NOTE — PLAN OF CARE
Problem: Discharge Planning  Goal: Discharge to home or other facility with appropriate resources  7/22/2024 0840 by Anuradha Rodriguez RN  Outcome: Progressing  Flowsheets (Taken 7/22/2024 0800)  Discharge to home or other facility with appropriate resources:   Identify barriers to discharge with patient and caregiver   Arrange for needed discharge resources and transportation as appropriate   Identify discharge learning needs (meds, wound care, etc)   Refer to discharge planning if patient needs post-hospital services based on physician order or complex needs related to functional status, cognitive ability or social support system  7/22/2024 0046 by Adrianna Lynn RN  Outcome: Progressing     Problem: Pain  Goal: Verbalizes/displays adequate comfort level or baseline comfort level  7/22/2024 0840 by nAuradha Rodriugez RN  Outcome: Progressing  Flowsheets (Taken 7/20/2024 0000 by Adrianna Lynn, RN)  Verbalizes/displays adequate comfort level or baseline comfort level:   Encourage patient to monitor pain and request assistance   Assess pain using appropriate pain scale   Notify Licensed Independent Practitioner if interventions unsuccessful or patient reports new pain   Implement non-pharmacological measures as appropriate and evaluate response   Administer analgesics based on type and severity of pain and evaluate response   Consider cultural and social influences on pain and pain management  7/22/2024 0046 by Adrianna Lynn RN  Outcome: Progressing     Problem: Chronic Conditions and Co-morbidities  Goal: Patient's chronic conditions and co-morbidity symptoms are monitored and maintained or improved  7/22/2024 0840 by Anuradha Rodriguez RN  Outcome: Progressing  Flowsheets (Taken 7/22/2024 0800)  Care Plan - Patient's Chronic Conditions and Co-Morbidity Symptoms are Monitored and Maintained or Improved:   Monitor and assess patient's chronic conditions and comorbid symptoms for stability, deterioration, or  Patient : Angel Casillas Age: 84 year old Sex: female   MRN: [id number] Encounter Date: 2/11/2024    History     Chief Complaint   Patient presents with    Abdominal Pain       HPI    Angel Casillas is a 84 year old presenting to the emergency department complaining of intermittent right upper quadrant abdominal pain and decreased p.o. intake.  Patient reports that she just recently had cholecystectomy with Dr. Rodriguez 02/02/2024.  Reports pain last for several hours and then resolves.  She does report hard small stools.  Patient/significant other state that they been emptying drained daily.  Patient /spouse report that initially they were getting about 30 cc out a day of serosanguineous fluid and now they are down to about 20 cc of serosanguineous fluid. Patient denies fever, chills, nausea, vomiting.    I have reviewed Angel Casillas's previous surgical note from 2/2/24 .  Note Review Summary: Per Dr. Rodriguez's note, surgery was complicated by hydrops/stone spillage:     \"Unfortunately as we retracted the gallbladder more cephalad we did ruptured spilling some hydrops and a few stones with some purulence this was immediately aspirated.  Bovie electrocautery was then utilized to incise the peritoneum attachments of the gallbladder to the liver.  The gallbladder was then bovied away from the liver bed being sure to control all bleeding points with electrocautery.  After we got the gallbladder completely free we placed it in an Endo-Catch bag.  The right upper quadrant was then reinspected it was irrigated and all blood clot stones and bile was aspirated.  Completely.  A Uvaldo drain was placed in the gallbladder fossa as well as in the right colonic gutter and brought out through the most lateral 5 mm port it was sewn in place with nylon suture. \"    Past/Family/Social History     Allergies   Allergen Reactions    Ciprofloxacin RASH    Crestor [Rosuvastatin Calcium] RASH    Donepezil Other (See Comments)    Fentanyl  Hallucinations      reports this . Patient says the dose might have been too high. Both are not sure    Meclizine RASH    Namenda [Memantine Hcl] RASH    Neomycin-Polymyxin B Gu Other (See Comments)    Niaspan [Niacin (Antihyperlipidemic)] RASH    Tricor RASH and VOMITING       Current Facility-Administered Medications   Medication    iohexol (OMNIPAQUE 300) contrast solution 100 mL    sodium chloride 0.9 % injector flush 100 mL     Current Outpatient Medications   Medication Sig    docusate sodium-sennosides (SENOKOT S) 50-8.6 MG per tablet Take 2 tablets by mouth daily as needed for Constipation.    famotidine (PEPCID) 20 MG tablet Take 1 tablet by mouth at bedtime.    HYDROcodone-acetaminophen (NORCO) 5-325 MG per tablet Take 1 tablet by mouth every 6 hours as needed for Pain.    timolol (TIMOPTIC) 0.5 % ophthalmic solution Place 1 drop into both eyes every morning.    amLODIPine (NORVASC) 5 MG tablet Take 5 mg by mouth daily.    nitroGLYCERIN (NITROSTAT) 0.4 MG sublingual tablet Place 0.4 mg under the tongue every 5 minutes as needed for Chest pain.    atorvastatin (LIPITOR) 80 MG tablet Take 1 tablet by mouth daily.    Magnesium 400 MG Cap Take 400 mg by mouth daily.    Apoaequorin (PREVAGEN PO) Take 1 capsule by mouth daily.    Calcium Carbonate-Vit D-Min (Calcium 600+D Plus Minerals) 600-400 MG-UNIT Tab Take 1 tablet by mouth daily.    dimenhyDRINATE (DRAMAMINE) 50 MG tablet Take 1 tablet by mouth daily as needed (dizziness).    Glucosamine-Chondroitin 500-400 MG Cap Take 1 capsule by mouth daily.    pantoprazole (PROTONIX) 40 MG tablet Take 40 mg by mouth in the morning and 40 mg in the evening.    fluticasone (FLONASE) 50 MCG/ACT nasal spray Spray 1 spray in each nostril 2 times daily.    aspirin 81 MG tablet Take 81 mg by mouth daily.    fish oil-omega-3 fatty acids 1000 MG CAPS Take 1 capsule by mouth daily.    Ascorbic Acid (VITAMIN C) 500 MG tablet Take 1,000 mg by mouth daily.    Acetaminophen  (TYLENOL ARTHRITIS PAIN PO) Take 1 tablet by mouth daily.    hydroCHLOROthiazide 25 MG tablet Take 1 tablet by mouth daily.       Past Medical History:   Diagnosis Date    Allergic rhinitis     Anemia     Arthritis     Cataract     Chronic kidney disease     stage 3     Coronary artery disease     Essential (primary) hypertension     Gastroesophageal reflux disease     Glaucoma (increased eye pressure)     High cholesterol     Renal artery arteriosclerosis (CMD)     Sinusitis, chronic     Sleep apnea        Past Surgical History:   Procedure Laterality Date    Carpal tunnel release Bilateral     Cataract extraction w/  intraocular lens implant Left 2021    Delgado Erwin MD     Cataract extraction w/  intraocular lens implant Right 2021    Delgado Erwin MD      section, classic      X4    Coronary angioplasty with stent placement      two stents     Total abdominal hysterectomy w/ bilateral salpingoophorectomy  2017    Trigger finger release Right        Family History   Problem Relation Age of Onset    Cancer Mother     Stroke Mother     Heart disease Mother     Diabetes Mother     Asthma Mother     Emphysema Father     Heart disease Brother     Myocardial Infarction Brother        Social History     Tobacco Use    Smoking status: Former     Passive exposure: Never    Smokeless tobacco: Never   Vaping Use    Vaping Use: never used   Substance Use Topics    Alcohol use: Not Currently     Alcohol/week: 0.0 standard drinks of alcohol    Drug use: Never          Review of Systems   Review of Symptoms     Review of Systems     Constitutional: Denies fevers or chills  Eyes: Denies vision changes  ENMT: Denies sore throat  CV: Denies chest pain  Resp: Denies SOB  GI: Denies vomiting or diarrhea Reports abd pain  : Denies painful urination  MSK: Denies recent trauma  Skin: Denies new rashes  Neuro: Denies new numbness or tingling or weakness  Endocrine: Denies unexpected weight loss  Heme: Denies  bleeding disorders   Physical Exam   Physical Exam     ED Triage Vitals [02/11/24 0436]   ED Triage Vitals Group      Temp 98.6 °F (37 °C)      Heart Rate 99      Resp 18      BP (!) 155/67      SpO2 96 %      EtCO2 mmHg       Height       Weight 170 lb 13.7 oz (77.5 kg)      Weight Scale Used Scale in bed      BMI (Calculated)       IBW/kg (Calculated)        Physical Exam     General: Patient is well nourished, well developed, awake and alert, resting comfortably in no acute distress  Head: Normocephalic and atraumatic  Eyes: Normal inspection, extraocular muscles intact, no conjunctival pallor, Pupils round and reactive to light  Ear, nose, throat: Normal external exam, Normal tm's b/l  Neck: Normal range of motion, no palpable lymphadenopathy   Respiratory: Patient is in no respiratory distress, lungs CTAB  Cardiovascular: Patient is not tachycardic, RRR without murmur appreciated  GI: Abd soft, ruq ttp with no guarding or rebound, no tympanny to percussion, KAREN drain with approximately 10 cc of serosanguineous drainage, mild erythema around KAREN drain site see picture below  Back: Normal inspection of the back with good strength and range of motion throughout all ext  Extremities: pulses intact with good cap refills, no LE pitting edema or calf tenderness  Neuro: The patient is alert and oriented to person, place, and time, appropriately conversive, with 5/5 bilat UE/LE strength, no gross motor or sensory defects noted. Coordination appears to be adequate.  Skin: Warm, dry, and intact         Procedures   ED Procedures     Procedures     Lab Results   ED Lab   Results for orders placed or performed during the hospital encounter of 02/11/24   TROPONIN I, HIGH SENSITIVITY   Result Value Ref Range    Troponin I, High Sensitivity 6 <52 ng/L   Lipase   Result Value Ref Range    Lipase 40 15 - 77 Units/L   Procalcitonin   Result Value Ref Range    Procalcitonin 0.07 <=0.09 ng/mL   C Reactive Protein   Result Value Ref  Range    C-Reactive Protein 4.3 (H) <=1.0 mg/dL   Basic Metabolic Panel   Result Value Ref Range    Fasting Status      Sodium 135 135 - 145 mmol/L    Potassium 4.0 3.4 - 5.1 mmol/L    Chloride 99 97 - 110 mmol/L    Carbon Dioxide 26 21 - 32 mmol/L    Anion Gap 14 7 - 19 mmol/L    Glucose 137 (H) 70 - 99 mg/dL    BUN 27 (H) 6 - 20 mg/dL    Creatinine 1.25 (H) 0.51 - 0.95 mg/dL    Glomerular Filtration Rate 43 (L) >=60    BUN/Cr 22 7 - 25    Calcium 9.0 8.4 - 10.2 mg/dL   Hepatic Function Panel   Result Value Ref Range    Albumin 2.8 (L) 3.6 - 5.1 g/dL    Bilirubin, Total 0.4 0.2 - 1.0 mg/dL    Bilirubin, Direct 0.2 0.0 - 0.2 mg/dL    Alkaline Phosphatase 153 (H) 45 - 117 Units/L    GPT/ALT 35 <64 Units/L    GOT/AST 22 <=37 Units/L    Protein, Total 7.3 6.4 - 8.2 g/dL   CBC with Automated Differential (performable only)   Result Value Ref Range    WBC 18.4 (H) 4.2 - 11.0 K/mcL    RBC 3.31 (L) 4.00 - 5.20 mil/mcL    HGB 10.3 (L) 12.0 - 15.5 g/dL    HCT 31.8 (L) 36.0 - 46.5 %    MCV 96.1 78.0 - 100.0 fl    MCH 31.1 26.0 - 34.0 pg    MCHC 32.4 32.0 - 36.5 g/dL    RDW-CV 13.3 11.0 - 15.0 %    RDW-SD 47.0 39.0 - 50.0 fL     140 - 450 K/mcL    NRBC 0 <=0 /100 WBC    Neutrophil, Percent 78 %    Lymphocytes, Percent 10 %    Mono, Percent 10 %    Eosinophils, Percent 1 %    Basophils, Percent 0 %    Immature Granulocytes 1 %    Absolute Neutrophils 14.5 (H) 1.8 - 7.7 K/mcL    Absolute Lymphocytes 1.9 1.0 - 4.0 K/mcL    Absolute Monocytes 1.8 (H) 0.3 - 0.9 K/mcL    Absolute Eosinophils  0.1 0.0 - 0.5 K/mcL    Absolute Basophils 0.1 0.0 - 0.3 K/mcL    Absolute Immature Granulocytes 0.1 0.0 - 0.2 K/mcL   Lactic Acid Venous With Reflex   Result Value Ref Range    Lactate, Venous 0.9 0.0 - 2.0 mmol/L   ISTAT8 VENOUS  POINT OF CARE   Result Value Ref Range    BUN - POINT OF CARE 24 (H) 6 - 20 mg/dL    SODIUM - POINT OF CARE 137 135 - 145 mmol/L    POTASSIUM - POINT OF CARE 4.1 3.4 - 5.1 mmol/L    CHLORIDE - POINT OF CARE 103  97 - 110 mmol/L    TCO2 - POINT OF CARE 23 19 - 24 mmol/L    ANION GAP - POINT OF CARE 16 7 - 19 mmol/L    HEMATOCRIT - POINT OF CARE 33.0 (L) 36.0 - 46.5 %    HEMOGLOBIN - POINT OF CARE 11.2 (L) 12.0 - 15.5 g/dL    GLUCOSE - POINT OF CARE 135 (H) 70 - 99 mg/dL    CALCIUM, IONIZED - POINT OF CARE 1.21 1.15 - 1.29 mmol/L    Creatinine 1.40 (H) 0.51 - 0.95 mg/dL    Glomerular Filtration Rate 38 (L) >=60          EKG     EKG Interpretation  Rate: 96 bpm  Rhythm: normal sinus rhythm   Abnormality: yes  No ST segment elevation/depression, HI/QRS/QTC interval, Q-wave noted in inferior leads similar to EKG performed January 31, 2024  Per my review of the EKG tracing, my findings are listed above, awaiting confirmation from cardiology    Radiology Results   ED Radiology Results     Imaging Results              CT ABDOMEN PELVIS W CONTRAST (In process)  Result time 02/11/24 05:00:24                         ED Medications   ED Medications     ED Medication Orders (From admission, onward)      Ordered Start     Status Ordering Provider    02/11/24 0544 02/11/24 0545  HYDROmorphone (DILAUDID) injection 0.5 mg  ONCE         Last MAR action: Given LINSEY ROMERO    02/11/24 0544 02/11/24 0545  ondansetron (ZOFRAN) injection 4 mg  ONCE         Last MAR action: Given LINSEY ROMERO    02/11/24 0442 02/11/24 0445  sodium chloride (NORMAL SALINE) 0.9 % bolus 500 mL  ONCE         Last MAR action: Completed LINSEY ROMERO                 ED Course     Vitals:    02/11/24 0500 02/11/24 0515 02/11/24 0530 02/11/24 0545   BP: (!) 156/69 (!) 159/67 (!) 169/68 (!) 165/66   BP Location:       Patient Position:       Pulse: 88 88 87 85   Resp:       Temp:       TempSrc:       SpO2: 98% 99% 98% 98%   Weight:                    Consults                    Medical Decision Making                               Patient is a pleasant 84-year-old female that presents with intermittent right upper quadrant abdominal pain status post  cholecystectomy.  She does have a KAREN drain that is draining serosanguineous fluid.  [place] [id number] although. Labs Otherwise unremarkable.  LFTs and bilirubins within normal limits.    Labs and ct are pending at shift change    Critical Care           Disposition     Patient care signed out to Dr. Monae at the end of my shift. Sign-out included relevant details of history, physical, and work-up to date. Will follow up on CT abd/pelvis and discussion with gen surgery and plan of care.            There is no disposition no dispo time  There is no comment                   Tucker Rg,   02/11/24 0606

## 2024-07-22 NOTE — PROGRESS NOTES
[facility] - Inpatient Rehabilitation Department   Phone: [phone]    Occupational Therapy    [] Initial Evaluation            [x] Daily Treatment Note         [] Discharge Summary      Patient: Te Garay   : 1965   MRN: [id number]   Date of Service:  2024    Admitting Diagnosis:  ESRD on peritoneal dialysis ([facility])  Current Admission Summary: 59 y.o. male with significant past medical history of type 1 diabetes diagnosed when he was 11-year-old, ESRD on peritoneal dialysis for the last 4 to 5 years, coronary artery disease s/p CABG, carotid artery stenosis s/p stent placement in the right ICA, left foot infection hypertension, hyperlipidemia, nephrolithiasis, presented to the hospital this time with having woken up with sudden onset of headache chest pain, in the ED he was evaluated and showed dysarthria and left lower lobe extremity weakness.  He was seen by Dr. Hannon of the stroke team., was given tenelctapase.   Past Medical History:  has a past medical history of Angina at rest ([facility]), Cardiomyopathy ([facility]), Carotid artery stenosis, Charcot foot due to diabetes mellitus ([facility]), CHF (congestive heart failure) ([facility]), CKD (chronic kidney disease) stage 2, GFR 60-89 ml/min, Coronary artery disease, Coronary artery disease involving native heart with angina pectoris ([facility]), De Quervain thyroiditis, Diabetic peripheral neuropathy ([facility]), Diabetic polyneuropathy associated with type 1 diabetes mellitus ([facility]), Diastolic HF (heart failure) ([facility]), Essential hypertension, History of COVID-19, Hyperlipidemia, Hyperlipidemia with target LDL less than 70, Hyperparathyroidism due to renal insufficiency ([facility]), Hypertension goal BP (blood pressure) < 130/80, Peritonitis associated with peritoneal dialysis ([facility]), PVD (peripheral vascular disease) ([facility]), S/P CABG (coronary artery bypass graft), Seizure disorder ([facility]), Seizures ([facility]), Stenosis of carotid artery, Toe osteomyelitis, left ([facility]), and Type 1  Assistance: contact guard assistance  Completes step negotiation per PT note. Pt was CGA progressing to SBA d/t orthostatic hypotension earlier in session. Pt required x4 rest breaks during mobility, 2 standing, 2 sated in transport chair.   Balance:  Static Sitting Balance: good: independent with functional balance in unsupported position  Dynamic Sitting Balance: fair (+): maintains balance at SBA/supervision without use of UE support  Static Standing Balance: fair (+): maintains balance at SBA/supervision without use of UE support  Dynamic Standing Balance: fair (-): maintains balance at CGA with use of UE support  Comments:    Other Therapeutic Interventions    Pt provided with educational materials over energy conservation techniques and strategies to implement into daily life and ADLs at home to promote safety and independence after d/c. Pt verbalizes understanding of materials.     Vitals   Sit EOB - BP: 130/72, MAP: 89, HR: 102 bpm  Stand EOB - BP: 103/74, MAP: 82, HR: 103 bpm  Stand EOB - BP: 106/79, MAP: 88, HR: 111 bpm  Sitting (transport chair)- BP: 117/79, MAP: 82, HR: 108 bpm  Sitting (bedside chair)- BP: 107/85, MAP: 91, HR: 109 bpm       Pt completed x4 stairs with PT. See PT note for details.       Functional Outcomes  AM-PAC Inpatient Daily Activity Raw Score: 18            Cognition  WFL  Orientation:    alert and oriented x 4  Command Following:   WFL     Education  Barriers To Learning: none  Patient Education: patient educated on goals, OT role and benefits, plan of care, transfer training, discharge recommendations  Learning Assessment:  patient verbalizes understanding, would benefit from continued reinforcement    Assessment  Activity Tolerance: fair. Pt limited by fluctuating BP with positional changes, and decreased endurance.   Impairments Requiring Therapeutic Intervention: decreased functional mobility, decreased ADL status, decreased strength, decreased safety awareness, decreased

## 2024-07-22 NOTE — PROGRESS NOTES
[facility] - Inpatient Rehabilitation Department   Phone: [phone]    Physical Therapy    [] Initial Evaluation            [x] Daily Treatment Note         [] Discharge Summary      Patient: Te Garay   : 1965   MRN: [id number]   Date of Service:  2024  Admitting Diagnosis: ESRD on peritoneal dialysis (H[facility])  Current Admission Summary: The patient is a 59 y.o. male with significant past medical history of type 1 diabetes diagnosed when he was 11-year-old, ESRD on peritoneal dialysis for the last 4 to 5 years, coronary artery disease s/p CABG, carotid artery stenosis s/p stent placement in the right ICA, left foot infection hypertension, hyperlipidemia, nephrolithiasis, presented to the hospital this time with having woken up with sudden onset of headache chest pain, in the ED he was evaluated and showed dysarthria and left lower lobe extremity weakness.  He was seen by Dr. Hannon of the stroke team., was given tenelctapase.  Past Medical History:  has a past medical history of Angina at rest ([facility]), Cardiomyopathy ([facility]), Carotid artery stenosis, Charcot foot due to diabetes mellitus ([facility]), CHF (congestive heart failure) ([facility]), CKD (chronic kidney disease) stage 2, GFR 60-89 ml/min, Coronary artery disease, Coronary artery disease involving native heart with angina pectoris ([facility]), De Quervain thyroiditis, Diabetic peripheral neuropathy ([facility]), Diabetic polyneuropathy associated with type 1 diabetes mellitus ([facility]), Diastolic HF (heart failure) ([facility]), Essential hypertension, History of COVID-19, Hyperlipidemia, Hyperlipidemia with target LDL less than 70, Hyperparathyroidism due to renal insufficiency ([facility]), Hypertension goal BP (blood pressure) < 130/80, Peritonitis associated with peritoneal dialysis ([facility]), PVD (peripheral vascular disease) ([facility]), S/P CABG (coronary artery bypass graft), Seizure disorder ([facility]), Seizures ([facility]), Stenosis of carotid artery, Toe osteomyelitis, left ([facility]), and

## 2024-07-23 ENCOUNTER — TELEPHONE (OUTPATIENT)
Dept: CARDIOLOGY CLINIC | Age: 59
End: 2024-07-23

## 2024-07-23 NOTE — PROGRESS NOTES
Discharge instructions/meds discussed with pt, all questions answered.  SL discontinued  To outside per w/c.  All belongings sent with pt.

## 2024-07-28 ENCOUNTER — HOSPITAL ENCOUNTER (INPATIENT)
Age: 59
LOS: 5 days | Discharge: HOME | DRG: 321 | End: 2024-08-02
Attending: EMERGENCY MEDICINE | Admitting: STUDENT IN AN ORGANIZED HEALTH CARE EDUCATION/TRAINING PROGRAM
Payer: MEDICARE

## 2024-07-28 ENCOUNTER — APPOINTMENT (OUTPATIENT)
Dept: GENERAL RADIOLOGY | Age: 59
DRG: 321 | End: 2024-07-28
Payer: MEDICARE

## 2024-07-28 ENCOUNTER — APPOINTMENT (OUTPATIENT)
Dept: CT IMAGING | Age: 59
DRG: 321 | End: 2024-07-28
Payer: MEDICARE

## 2024-07-28 DIAGNOSIS — M79.672 LEFT FOOT PAIN: ICD-10-CM

## 2024-07-28 DIAGNOSIS — Z99.2 ESRD ON PERITONEAL DIALYSIS (HCC): ICD-10-CM

## 2024-07-28 DIAGNOSIS — N18.6 ESRD ON PERITONEAL DIALYSIS (HCC): ICD-10-CM

## 2024-07-28 DIAGNOSIS — R07.9 CHEST PAIN, UNSPECIFIED TYPE: Primary | ICD-10-CM

## 2024-07-28 DIAGNOSIS — I25.10 CAD (CORONARY ARTERY DISEASE): ICD-10-CM

## 2024-07-28 DIAGNOSIS — R79.89 ELEVATED TROPONIN: ICD-10-CM

## 2024-07-28 DIAGNOSIS — R07.9 CHEST PAIN: ICD-10-CM

## 2024-07-28 DIAGNOSIS — I24.9 ACUTE CORONARY SYNDROME (HCC): ICD-10-CM

## 2024-07-28 DIAGNOSIS — R73.9 HYPERGLYCEMIA: ICD-10-CM

## 2024-07-28 LAB
ALBUMIN SERPL-MCNC: 3.8 G/DL (ref 3.4–5)
ALBUMIN/GLOB SERPL: 1.4 {RATIO} (ref 1.1–2.2)
ALP SERPL-CCNC: 90 U/L (ref 40–129)
ALT SERPL-CCNC: 19 U/L (ref 10–40)
ANION GAP SERPL CALCULATED.3IONS-SCNC: 14 MMOL/L (ref 3–16)
ANION GAP SERPL CALCULATED.3IONS-SCNC: 19 MMOL/L (ref 3–16)
ANTI-XA UNFRAC HEPARIN: 0.33 IU/ML (ref 0.3–0.7)
ANTI-XA UNFRAC HEPARIN: 0.37 IU/ML (ref 0.3–0.7)
ANTI-XA UNFRAC HEPARIN: <0.1 IU/ML (ref 0.3–0.7)
APTT BLD: 28.2 SEC (ref 22.1–36.4)
AST SERPL-CCNC: 22 U/L (ref 15–37)
BASOPHILS # BLD: 0 K/UL (ref 0–0.2)
BASOPHILS NFR BLD: 0.4 %
BILIRUB SERPL-MCNC: 0.3 MG/DL (ref 0–1)
BUN SERPL-MCNC: 61 MG/DL (ref 7–20)
BUN SERPL-MCNC: 68 MG/DL (ref 7–20)
CALCIUM SERPL-MCNC: 8.7 MG/DL (ref 8.3–10.6)
CALCIUM SERPL-MCNC: 8.8 MG/DL (ref 8.3–10.6)
CHLORIDE SERPL-SCNC: 95 MMOL/L (ref 99–110)
CHLORIDE SERPL-SCNC: 99 MMOL/L (ref 99–110)
CO2 SERPL-SCNC: 19 MMOL/L (ref 21–32)
CO2 SERPL-SCNC: 22 MMOL/L (ref 21–32)
CREAT SERPL-MCNC: 9.4 MG/DL (ref 0.9–1.3)
CREAT SERPL-MCNC: 9.8 MG/DL (ref 0.9–1.3)
DEPRECATED RDW RBC AUTO: 14.6 % (ref 12.4–15.4)
DEPRECATED RDW RBC AUTO: 14.6 % (ref 12.4–15.4)
EKG ATRIAL RATE: 79 BPM
EKG ATRIAL RATE: 99 BPM
EKG DIAGNOSIS: NORMAL
EKG DIAGNOSIS: NORMAL
EKG P AXIS: 56 DEGREES
EKG P AXIS: 57 DEGREES
EKG P-R INTERVAL: 152 MS
EKG P-R INTERVAL: 158 MS
EKG Q-T INTERVAL: 392 MS
EKG Q-T INTERVAL: 418 MS
EKG QRS DURATION: 112 MS
EKG QRS DURATION: 116 MS
EKG QTC CALCULATION (BAZETT): 479 MS
EKG QTC CALCULATION (BAZETT): 503 MS
EKG R AXIS: 28 DEGREES
EKG R AXIS: 54 DEGREES
EKG T AXIS: 104 DEGREES
EKG T AXIS: 118 DEGREES
EKG VENTRICULAR RATE: 79 BPM
EKG VENTRICULAR RATE: 99 BPM
EOSINOPHIL # BLD: 0.1 K/UL (ref 0–0.6)
EOSINOPHIL NFR BLD: 2 %
GFR SERPLBLD CREATININE-BSD FMLA CKD-EPI: 6 ML/MIN/{1.73_M2}
GFR SERPLBLD CREATININE-BSD FMLA CKD-EPI: 6 ML/MIN/{1.73_M2}
GLUCOSE BLD-MCNC: 109 MG/DL (ref 70–99)
GLUCOSE BLD-MCNC: 223 MG/DL (ref 70–99)
GLUCOSE BLD-MCNC: 234 MG/DL (ref 70–99)
GLUCOSE BLD-MCNC: 334 MG/DL (ref 70–99)
GLUCOSE BLD-MCNC: 399 MG/DL (ref 70–99)
GLUCOSE BLD-MCNC: 496 MG/DL (ref 70–99)
GLUCOSE SERPL-MCNC: 378 MG/DL (ref 70–99)
GLUCOSE SERPL-MCNC: 406 MG/DL (ref 70–99)
HCT VFR BLD AUTO: 32.4 % (ref 40.5–52.5)
HCT VFR BLD AUTO: 35.2 % (ref 40.5–52.5)
HGB BLD-MCNC: 10.7 G/DL (ref 13.5–17.5)
HGB BLD-MCNC: 11.5 G/DL (ref 13.5–17.5)
INR PPP: 1.08 (ref 0.85–1.15)
INR PPP: 1.11 (ref 0.85–1.15)
LYMPHOCYTES # BLD: 1.5 K/UL (ref 1–5.1)
LYMPHOCYTES NFR BLD: 21.4 %
MCH RBC QN AUTO: 30.1 PG (ref 26–34)
MCH RBC QN AUTO: 30.6 PG (ref 26–34)
MCHC RBC AUTO-ENTMCNC: 32.6 G/DL (ref 31–36)
MCHC RBC AUTO-ENTMCNC: 32.9 G/DL (ref 31–36)
MCV RBC AUTO: 92.4 FL (ref 80–100)
MCV RBC AUTO: 92.8 FL (ref 80–100)
MONOCYTES # BLD: 0.8 K/UL (ref 0–1.3)
MONOCYTES NFR BLD: 11.6 %
NEUTROPHILS # BLD: 4.4 K/UL (ref 1.7–7.7)
NEUTROPHILS NFR BLD: 64.6 %
NT-PROBNP SERPL-MCNC: 9062 PG/ML (ref 0–124)
PERFORMED ON: ABNORMAL
PLATELET # BLD AUTO: 171 K/UL (ref 135–450)
PLATELET # BLD AUTO: 184 K/UL (ref 135–450)
PMV BLD AUTO: 10.1 FL (ref 5–10.5)
PMV BLD AUTO: 9.9 FL (ref 5–10.5)
POTASSIUM SERPL-SCNC: 4.7 MMOL/L (ref 3.5–5.1)
POTASSIUM SERPL-SCNC: 5.1 MMOL/L (ref 3.5–5.1)
PROT SERPL-MCNC: 6.6 G/DL (ref 6.4–8.2)
PROTHROMBIN TIME: 14.3 SEC (ref 11.9–14.9)
PROTHROMBIN TIME: 14.5 SEC (ref 11.9–14.9)
RBC # BLD AUTO: 3.49 M/UL (ref 4.2–5.9)
RBC # BLD AUTO: 3.81 M/UL (ref 4.2–5.9)
SODIUM SERPL-SCNC: 133 MMOL/L (ref 136–145)
SODIUM SERPL-SCNC: 135 MMOL/L (ref 136–145)
TROPONIN, HIGH SENSITIVITY: 366 NG/L (ref 0–22)
TROPONIN, HIGH SENSITIVITY: 426 NG/L (ref 0–22)
TROPONIN, HIGH SENSITIVITY: 469 NG/L (ref 0–22)
WBC # BLD AUTO: 6.8 K/UL (ref 4–11)
WBC # BLD AUTO: 7.1 K/UL (ref 4–11)

## 2024-07-28 PROCEDURE — 85520 HEPARIN ASSAY: CPT

## 2024-07-28 PROCEDURE — 70450 CT HEAD/BRAIN W/O DYE: CPT

## 2024-07-28 PROCEDURE — 6370000000 HC RX 637 (ALT 250 FOR IP): Performed by: STUDENT IN AN ORGANIZED HEALTH CARE EDUCATION/TRAINING PROGRAM

## 2024-07-28 PROCEDURE — 99223 1ST HOSP IP/OBS HIGH 75: CPT | Performed by: INTERNAL MEDICINE

## 2024-07-28 PROCEDURE — 85027 COMPLETE CBC AUTOMATED: CPT

## 2024-07-28 PROCEDURE — 80053 COMPREHEN METABOLIC PANEL: CPT

## 2024-07-28 PROCEDURE — 3E1M39Z IRRIGATION OF PERITONEAL CAVITY USING DIALYSATE, PERCUTANEOUS APPROACH: ICD-10-PCS | Performed by: HOSPITALIST

## 2024-07-28 PROCEDURE — 1200000000 HC SEMI PRIVATE

## 2024-07-28 PROCEDURE — 85025 COMPLETE CBC W/AUTO DIFF WBC: CPT

## 2024-07-28 PROCEDURE — 85730 THROMBOPLASTIN TIME PARTIAL: CPT

## 2024-07-28 PROCEDURE — 93010 ELECTROCARDIOGRAM REPORT: CPT | Performed by: INTERNAL MEDICINE

## 2024-07-28 PROCEDURE — 36415 COLL VENOUS BLD VENIPUNCTURE: CPT

## 2024-07-28 PROCEDURE — 99285 EMERGENCY DEPT VISIT HI MDM: CPT

## 2024-07-28 PROCEDURE — 5A1D70Z PERFORMANCE OF URINARY FILTRATION, INTERMITTENT, LESS THAN 6 HOURS PER DAY: ICD-10-PCS | Performed by: HOSPITALIST

## 2024-07-28 PROCEDURE — 85610 PROTHROMBIN TIME: CPT

## 2024-07-28 PROCEDURE — 2580000003 HC RX 258: Performed by: EMERGENCY MEDICINE

## 2024-07-28 PROCEDURE — 94760 N-INVAS EAR/PLS OXIMETRY 1: CPT

## 2024-07-28 PROCEDURE — 6360000002 HC RX W HCPCS: Performed by: INTERNAL MEDICINE

## 2024-07-28 PROCEDURE — 93005 ELECTROCARDIOGRAM TRACING: CPT | Performed by: STUDENT IN AN ORGANIZED HEALTH CARE EDUCATION/TRAINING PROGRAM

## 2024-07-28 PROCEDURE — 90945 DIALYSIS ONE EVALUATION: CPT

## 2024-07-28 PROCEDURE — 84484 ASSAY OF TROPONIN QUANT: CPT

## 2024-07-28 PROCEDURE — 6360000002 HC RX W HCPCS: Performed by: STUDENT IN AN ORGANIZED HEALTH CARE EDUCATION/TRAINING PROGRAM

## 2024-07-28 PROCEDURE — 71045 X-RAY EXAM CHEST 1 VIEW: CPT

## 2024-07-28 PROCEDURE — 6370000000 HC RX 637 (ALT 250 FOR IP): Performed by: EMERGENCY MEDICINE

## 2024-07-28 PROCEDURE — 2580000003 HC RX 258: Performed by: STUDENT IN AN ORGANIZED HEALTH CARE EDUCATION/TRAINING PROGRAM

## 2024-07-28 PROCEDURE — 83880 ASSAY OF NATRIURETIC PEPTIDE: CPT

## 2024-07-28 RX ORDER — ACETAMINOPHEN 650 MG/1
650 SUPPOSITORY RECTAL EVERY 6 HOURS PRN
Status: DISCONTINUED | OUTPATIENT
Start: 2024-07-28 | End: 2024-08-02 | Stop reason: HOSPADM

## 2024-07-28 RX ORDER — POLYETHYLENE GLYCOL 3350 17 G/17G
17 POWDER, FOR SOLUTION ORAL DAILY PRN
Status: DISCONTINUED | OUTPATIENT
Start: 2024-07-28 | End: 2024-08-02 | Stop reason: HOSPADM

## 2024-07-28 RX ORDER — INSULIN LISPRO 100 [IU]/ML
0-8 INJECTION, SOLUTION INTRAVENOUS; SUBCUTANEOUS
Status: DISCONTINUED | OUTPATIENT
Start: 2024-07-28 | End: 2024-08-01

## 2024-07-28 RX ORDER — ISOSORBIDE MONONITRATE 30 MG/1
30 TABLET, EXTENDED RELEASE ORAL NIGHTLY
Status: DISCONTINUED | OUTPATIENT
Start: 2024-07-28 | End: 2024-08-02 | Stop reason: HOSPADM

## 2024-07-28 RX ORDER — SODIUM CHLORIDE 0.9 % (FLUSH) 0.9 %
5-40 SYRINGE (ML) INJECTION PRN
Status: DISCONTINUED | OUTPATIENT
Start: 2024-07-28 | End: 2024-08-02 | Stop reason: HOSPADM

## 2024-07-28 RX ORDER — ASPIRIN 81 MG/1
81 TABLET ORAL DAILY
Status: DISCONTINUED | OUTPATIENT
Start: 2024-07-28 | End: 2024-08-02 | Stop reason: HOSPADM

## 2024-07-28 RX ORDER — ONDANSETRON 2 MG/ML
4 INJECTION INTRAMUSCULAR; INTRAVENOUS EVERY 6 HOURS PRN
Status: DISCONTINUED | OUTPATIENT
Start: 2024-07-28 | End: 2024-08-02 | Stop reason: HOSPADM

## 2024-07-28 RX ORDER — LEVETIRACETAM 250 MG/1
500 TABLET ORAL DAILY
Status: DISCONTINUED | OUTPATIENT
Start: 2024-07-28 | End: 2024-08-02 | Stop reason: HOSPADM

## 2024-07-28 RX ORDER — CLOPIDOGREL BISULFATE 75 MG/1
75 TABLET ORAL DAILY
Status: DISCONTINUED | OUTPATIENT
Start: 2024-07-28 | End: 2024-08-02 | Stop reason: HOSPADM

## 2024-07-28 RX ORDER — HEPARIN SODIUM 10000 [USP'U]/100ML
5-30 INJECTION, SOLUTION INTRAVENOUS CONTINUOUS
Status: DISCONTINUED | OUTPATIENT
Start: 2024-07-28 | End: 2024-07-31

## 2024-07-28 RX ORDER — HEPARIN SODIUM 1000 [USP'U]/ML
4000 INJECTION, SOLUTION INTRAVENOUS; SUBCUTANEOUS PRN
Status: DISCONTINUED | OUTPATIENT
Start: 2024-07-28 | End: 2024-07-31

## 2024-07-28 RX ORDER — CARVEDILOL 6.25 MG/1
12.5 TABLET ORAL 2 TIMES DAILY WITH MEALS
Status: DISCONTINUED | OUTPATIENT
Start: 2024-07-28 | End: 2024-08-02 | Stop reason: HOSPADM

## 2024-07-28 RX ORDER — INSULIN LISPRO 100 [IU]/ML
0-4 INJECTION, SOLUTION INTRAVENOUS; SUBCUTANEOUS NIGHTLY
Status: DISCONTINUED | OUTPATIENT
Start: 2024-07-28 | End: 2024-08-01

## 2024-07-28 RX ORDER — HEPARIN SODIUM 5000 [USP'U]/ML
5000 INJECTION, SOLUTION INTRAVENOUS; SUBCUTANEOUS EVERY 8 HOURS SCHEDULED
Status: DISCONTINUED | OUTPATIENT
Start: 2024-07-28 | End: 2024-07-28

## 2024-07-28 RX ORDER — GLUCAGON 1 MG/ML
1 KIT INJECTION PRN
Status: DISCONTINUED | OUTPATIENT
Start: 2024-07-28 | End: 2024-08-02 | Stop reason: HOSPADM

## 2024-07-28 RX ORDER — 0.9 % SODIUM CHLORIDE 0.9 %
500 INTRAVENOUS SOLUTION INTRAVENOUS ONCE
Status: COMPLETED | OUTPATIENT
Start: 2024-07-28 | End: 2024-07-28

## 2024-07-28 RX ORDER — MIDODRINE HYDROCHLORIDE 5 MG/1
10 TABLET ORAL ONCE
Status: COMPLETED | OUTPATIENT
Start: 2024-07-28 | End: 2024-07-28

## 2024-07-28 RX ORDER — ONDANSETRON 4 MG/1
4 TABLET, ORALLY DISINTEGRATING ORAL EVERY 8 HOURS PRN
Status: DISCONTINUED | OUTPATIENT
Start: 2024-07-28 | End: 2024-08-02 | Stop reason: HOSPADM

## 2024-07-28 RX ORDER — SODIUM CHLORIDE 0.9 % (FLUSH) 0.9 %
5-40 SYRINGE (ML) INJECTION EVERY 12 HOURS SCHEDULED
Status: DISCONTINUED | OUTPATIENT
Start: 2024-07-28 | End: 2024-08-02 | Stop reason: HOSPADM

## 2024-07-28 RX ORDER — ACETAMINOPHEN 325 MG/1
650 TABLET ORAL EVERY 6 HOURS PRN
Status: DISCONTINUED | OUTPATIENT
Start: 2024-07-28 | End: 2024-07-31 | Stop reason: ALTCHOICE

## 2024-07-28 RX ORDER — FUROSEMIDE 40 MG/1
40 TABLET ORAL 2 TIMES DAILY
Status: DISCONTINUED | OUTPATIENT
Start: 2024-07-28 | End: 2024-08-01 | Stop reason: DRUGHIGH

## 2024-07-28 RX ORDER — HEPARIN SODIUM 1000 [USP'U]/ML
2000 INJECTION, SOLUTION INTRAVENOUS; SUBCUTANEOUS PRN
Status: DISCONTINUED | OUTPATIENT
Start: 2024-07-28 | End: 2024-07-31

## 2024-07-28 RX ORDER — ROSUVASTATIN CALCIUM 20 MG/1
40 TABLET, COATED ORAL EVERY EVENING
Status: DISCONTINUED | OUTPATIENT
Start: 2024-07-28 | End: 2024-08-02 | Stop reason: HOSPADM

## 2024-07-28 RX ORDER — ISOSORBIDE MONONITRATE 30 MG/1
60 TABLET, EXTENDED RELEASE ORAL DAILY
Status: DISCONTINUED | OUTPATIENT
Start: 2024-07-28 | End: 2024-07-28

## 2024-07-28 RX ORDER — INSULIN GLARGINE 100 [IU]/ML
27 INJECTION, SOLUTION SUBCUTANEOUS DAILY
Status: DISCONTINUED | OUTPATIENT
Start: 2024-07-28 | End: 2024-08-01

## 2024-07-28 RX ORDER — SODIUM CHLORIDE 9 MG/ML
INJECTION, SOLUTION INTRAVENOUS PRN
Status: DISCONTINUED | OUTPATIENT
Start: 2024-07-28 | End: 2024-08-02 | Stop reason: HOSPADM

## 2024-07-28 RX ORDER — DEXTROSE MONOHYDRATE 100 MG/ML
INJECTION, SOLUTION INTRAVENOUS CONTINUOUS PRN
Status: DISCONTINUED | OUTPATIENT
Start: 2024-07-28 | End: 2024-08-02 | Stop reason: HOSPADM

## 2024-07-28 RX ORDER — INSULIN LISPRO 100 [IU]/ML
8 INJECTION, SOLUTION INTRAVENOUS; SUBCUTANEOUS
Status: DISCONTINUED | OUTPATIENT
Start: 2024-07-28 | End: 2024-07-29

## 2024-07-28 RX ORDER — RANOLAZINE 500 MG/1
500 TABLET, EXTENDED RELEASE ORAL 2 TIMES DAILY
Status: DISCONTINUED | OUTPATIENT
Start: 2024-07-28 | End: 2024-08-02 | Stop reason: HOSPADM

## 2024-07-28 RX ORDER — HEPARIN SODIUM 1000 [USP'U]/ML
4000 INJECTION, SOLUTION INTRAVENOUS; SUBCUTANEOUS ONCE
Status: COMPLETED | OUTPATIENT
Start: 2024-07-28 | End: 2024-07-28

## 2024-07-28 RX ADMIN — SACUBITRIL AND VALSARTAN 1 TABLET: 49; 51 TABLET, FILM COATED ORAL at 22:03

## 2024-07-28 RX ADMIN — MIDODRINE HYDROCHLORIDE 10 MG: 5 TABLET ORAL at 01:17

## 2024-07-28 RX ADMIN — RANOLAZINE 500 MG: 500 TABLET, EXTENDED RELEASE ORAL at 09:45

## 2024-07-28 RX ADMIN — INSULIN LISPRO 8 UNITS: 100 INJECTION, SOLUTION INTRAVENOUS; SUBCUTANEOUS at 18:03

## 2024-07-28 RX ADMIN — ISOSORBIDE MONONITRATE 30 MG: 30 TABLET, EXTENDED RELEASE ORAL at 22:03

## 2024-07-28 RX ADMIN — ASPIRIN 81 MG: 81 TABLET, COATED ORAL at 09:45

## 2024-07-28 RX ADMIN — CLOPIDOGREL BISULFATE 75 MG: 75 TABLET ORAL at 09:45

## 2024-07-28 RX ADMIN — INSULIN LISPRO 2 UNITS: 100 INJECTION, SOLUTION INTRAVENOUS; SUBCUTANEOUS at 18:03

## 2024-07-28 RX ADMIN — SODIUM CHLORIDE, PRESERVATIVE FREE 10 ML: 5 INJECTION INTRAVENOUS at 09:02

## 2024-07-28 RX ADMIN — INSULIN LISPRO 4 UNITS: 100 INJECTION, SOLUTION INTRAVENOUS; SUBCUTANEOUS at 22:03

## 2024-07-28 RX ADMIN — ROSUVASTATIN 40 MG: 20 TABLET, FILM COATED ORAL at 17:59

## 2024-07-28 RX ADMIN — SODIUM CHLORIDE 500 ML: 9 INJECTION, SOLUTION INTRAVENOUS at 01:26

## 2024-07-28 RX ADMIN — INSULIN GLARGINE 27 UNITS: 100 INJECTION, SOLUTION SUBCUTANEOUS at 06:05

## 2024-07-28 RX ADMIN — HEPARIN SODIUM 5000 UNITS: 5000 INJECTION INTRAVENOUS; SUBCUTANEOUS at 06:06

## 2024-07-28 RX ADMIN — RANOLAZINE 500 MG: 500 TABLET, EXTENDED RELEASE ORAL at 22:03

## 2024-07-28 RX ADMIN — HEPARIN SODIUM 4000 UNITS: 1000 INJECTION INTRAVENOUS; SUBCUTANEOUS at 10:57

## 2024-07-28 RX ADMIN — CARVEDILOL 12.5 MG: 6.25 TABLET, FILM COATED ORAL at 17:59

## 2024-07-28 RX ADMIN — HEPARIN SODIUM 11 UNITS/KG/HR: 10000 INJECTION, SOLUTION INTRAVENOUS at 11:00

## 2024-07-28 RX ADMIN — LEVETIRACETAM 500 MG: 500 TABLET, FILM COATED ORAL at 09:45

## 2024-07-28 RX ADMIN — INSULIN LISPRO 2 UNITS: 100 INJECTION, SOLUTION INTRAVENOUS; SUBCUTANEOUS at 09:03

## 2024-07-28 RX ADMIN — CARVEDILOL 12.5 MG: 6.25 TABLET, FILM COATED ORAL at 09:45

## 2024-07-28 ASSESSMENT — PAIN DESCRIPTION - ORIENTATION: ORIENTATION: LEFT

## 2024-07-28 ASSESSMENT — PAIN DESCRIPTION - LOCATION: LOCATION: CHEST

## 2024-07-28 ASSESSMENT — PAIN - FUNCTIONAL ASSESSMENT: PAIN_FUNCTIONAL_ASSESSMENT: 0-10

## 2024-07-28 ASSESSMENT — PAIN SCALES - GENERAL
PAINLEVEL_OUTOF10: 0
PAINLEVEL_OUTOF10: 7

## 2024-07-28 NOTE — PROGRESS NOTES
[facility]    7/28/2024 9:17 AM        Name: Te Garay .              Admitted: 7/28/2024  Primary Care Provider: Reid Lei MD (Tel: [phone])      Chief complaint:  58 yo male with hx CAD, CHF, ESRD on PD, DM1, HTN. He presented to ER with c/o left sided chest pain which radiated to left shoulder, neck and jaw.  Troponin was significantly elevated from baseline. Admitted to r/o ACS.     Subjective:  Resting in bed, HOB nearly flat. States he is feeling better. No further chest or jaw pain. Denies shortness of breath, cough, palpitations, abdominal pain, nausea.    Reviewed interval ancillary notes    Current Medications  aspirin EC tablet 81 mg, Daily  carvedilol (COREG) tablet 12.5 mg, BID WC  clopidogrel (PLAVIX) tablet 75 mg, Daily  furosemide (LASIX) tablet 40 mg, BID  isosorbide mononitrate (IMDUR) extended release tablet 30 mg, Nightly  levETIRAcetam (KEPPRA) tablet 500 mg, Daily  ranolazine (RANEXA) extended release tablet 500 mg, BID  rosuvastatin (CRESTOR) tablet 40 mg, QPM  sacubitril-valsartan (ENTRESTO) 49-51 MG per tablet 1 tablet, BID  sodium chloride flush 0.9 % injection 5-40 mL, 2 times per day  sodium chloride flush 0.9 % injection 5-40 mL, PRN  0.9 % sodium chloride infusion, PRN  ondansetron (ZOFRAN-ODT) disintegrating tablet 4 mg, Q8H PRN   Or  ondansetron (ZOFRAN) injection 4 mg, Q6H PRN  acetaminophen (TYLENOL) tablet 650 mg, Q6H PRN   Or  acetaminophen (TYLENOL) suppository 650 mg, Q6H PRN  polyethylene glycol (GLYCOLAX) packet 17 g, Daily PRN  heparin (porcine) injection 5,000 Units, 3 times per day  insulin lispro (HUMALOG,ADMELOG) injection vial 0-8 Units, TID WC  insulin lispro (HUMALOG,ADMELOG) injection vial 0-4 Units, Nightly  dextrose bolus 10% 125 mL, PRN   Or  dextrose bolus 10% 250 mL, PRN  glucagon injection 1 mg, PRN  dextrose 10 % infusion, Continuous

## 2024-07-28 NOTE — ED PROVIDER NOTES
[facility] Emergency Department [facility]    I, Ryan Hay MD, am the primary clinician of record.    CHIEF COMPLAINT  Chief Complaint   Patient presents with    Chest Pain     Pt arrived via [facility] from w c/o L sided CP (7/10) w radiation to L arm, neck and shoulder that started during his home dialysis. Pt received ASA x 3 en route        HISTORY OF PRESENT ILLNESS  Te Garay is a 59 y.o. male  who presents to the ED complaining of acute onset chest pain. Reports that he was performing his home peritoneal dialysis when the pain began. Described as sharp on the left side of his chest which then radiates up into his neck and jaw.  He has a history of poorly controlled diabetes.    No other complaints, modifying factors or associated symptoms.     I have reviewed the following from the nursing documentation.    Past Medical History:   Diagnosis Date    Angina at rest ([facility])     Cardiomyopathy ([facility])     Carotid artery stenosis 10/25/2016    SUZANNA stented with 8 x 30 mm non-tapered Xact stent    Charcot foot due to diabetes mellitus ([facility]) 07/01/2024    CHF (congestive heart failure) ([facility]) 03/03/2015    EF 30%     CKD (chronic kidney disease) stage 2, GFR 60-89 ml/min     Coronary artery disease     sp CABG UC    Coronary artery disease involving native heart with angina pectoris ([facility]) 05/26/2010    Formatting of this note might be different from the original.   s/p CABG      Last Assessment & Plan:    Formatting of this note might be different from the original.   s/p PTCA and stent during 04/24 hosptialization.        Cardiology (Dr. Celestin)  has told him that he will get cardiac clearance, but he needs to have angioplasty at some point because there is a small lesion that needs to be addr    De Quervain thyroiditis 06/08/2023    Last Assessment & Plan:    Formatting of this note might be different from the original.   He has left texting thumb x 3 months.  This is not carpal tunnel.  Evidently, he has

## 2024-07-28 NOTE — ED NOTES
Report called to Ellen SANCHEZ. All questions and concerns answered at this time. Pt resting in bed with eyes closed. Respirations even and unlabored on room air. IV intact.

## 2024-07-28 NOTE — PROGRESS NOTES
Pt admitted to room 5916. VSS. Pt A&Ox4. Oriented pt to room and call light. Went over POC with pt. Pt v/u. Oriented pt to room and call light. Bedside table and call light within reach. Will monitor.

## 2024-07-28 NOTE — CARE COORDINATION
07/28/24 1214   Readmission Assessment   Number of Days since last admission? 1-7 days   Previous Disposition Home with Home Health  ([facility])   Who is being Interviewed Patient   What was the patient's/caregiver's perception as to why they think they needed to return back to the hospital? Did not realize care needs would be so extensive   Did you visit your Primary Care Physician after you left the hospital, before you returned this time? No   Why weren't you able to visit your PCP? Did not have an appointment   Did you see a specialist, such as Cardiac, Pulmonary, Orthopedic Physician, etc. after you left the hospital? No   Who advised the patient to return to the hospital? Caregiver  (Pt's wife)   Does the patient report anything that got in the way of taking their medications? No   In our efforts to provide the best possible care to you and others like you, can you think of anything that we could have done to help you after you left the hospital the first time, so that you might not have needed to return so soon? Identify patient's health literacy needs           Electronically signed by ROBIN Roldan on 7/28/2024 at 12:16 PM

## 2024-07-28 NOTE — DISCHARGE SUMMARY
MRI BRAIN WO CONTRAST   Final Result   No acute cortical infarct or other acute intracranial process.         XR CHEST PORTABLE   Final Result   Silhouetting of the left heart border may be due to atelectasis or pneumonia.         XR CHEST PORTABLE   Final Result   1. No acute cardiopulmonary process.   2. Status post CABG.         CTA HEAD NECK W CONTRAST   Final Result   1. No significant carotid stenosis.   2. Patent endovascular stent in the proximal segment of right internal   carotid artery.   3. Calcified plaques at the origin and proximal segment of the left internal   carotid artery with 50% stenosis.   4. Calcified plaques in the lower portion of the cervical segment of the   right vertebral artery with mild stenosis.   5. No significant intracranial arterial stenosis or occlusion.   6. No evidence of aneurysm.   7. No acute intracranial abnormality on the precontrast CT scan of head on   07/16/2024 at 0404 hours.         CT HEAD WO CONTRAST   Final Result   No acute intracranial abnormality.      Findings were discussed with VERONICA Goldman by Paola Wallace on   07/16/2024 at 4:13 a.m.                Consults:     IP CONSULT TO NEUROLOGY  IP CONSULT TO NEPHROLOGY  IP CONSULT TO PHYSICAL MEDICINE REHAB  IP CONSULT TO SPIRITUAL SERVICES  IP CONSULT TO SOCIAL WORK  IP CONSULT TO CARDIOLOGY  IP CONSULT TO DIABETES EDUCATOR  IP CONSULT TO HOME CARE NEEDS    F/U APPTS:  Reid Lei MD  [facility]- 2nd Floor  [facility] [address] [phone]    Schedule an appointment as soon as possible for a visit in 1 week(s)  follow up appointment after hospitalization      Diet:  renal diet     Activity:  activity as tolerated    Disposition:  Discharged to [facility]    Rehab: Patient returned to prior level of function, rehabilitation not indicated at this time    Condition at Discharge: Stable    Code Status:  Full Code     Discharge Medications:     Discharge Medication List as of

## 2024-07-28 NOTE — CONSULTS
[facility]  Cardiology Note  [phone]      Chief Complaint   Patient presents with    Chest Pain     Pt arrived via [facility] from w c/o L sided CP (7/10) w radiation to L arm, neck and shoulder that started during his home dialysis. Pt received ASA x 3 en route        History of Present Illness:  Te Garay is a 59 y.o. patient PMHx CAD s/p CABG, ICM, ESRD on HD, DM-I who presented with CP and elevated troponin    Recent admission 7/16 for possible CVA treated with lytics.     Patient presented to hospital with CP that began after exerting himself at PT session. Pain started in the center of the chest with radiation to the L arm and jaw, subsided with rest    Patient has been compliant with PD and at home was at his dry weight.     No further CP since admission    Past Medical History:   has a past medical history of Angina at rest ([facility]), Cardiomyopathy ([facility]), Carotid artery stenosis, Charcot foot due to diabetes mellitus ([facility]), CHF (congestive heart failure) ([facility]), CKD (chronic kidney disease) stage 2, GFR 60-89 ml/min, Coronary artery disease, Coronary artery disease involving native heart with angina pectoris ([facility]), De Quervain thyroiditis, Diabetic peripheral neuropathy ([facility]), Diabetic polyneuropathy associated with type 1 diabetes mellitus ([facility]), Diastolic HF (heart failure) ([facility]), Essential hypertension, History of COVID-19, Hyperlipidemia, Hyperlipidemia with target LDL less than 70, Hyperparathyroidism due to renal insufficiency ([facility]), Hypertension goal BP (blood pressure) < 130/80, Peritonitis associated with peritoneal dialysis ([facility]), PVD (peripheral vascular disease) ([facility]), S/P CABG (coronary artery bypass graft), Seizure disorder ([facility]), Seizures ([facility]), Stenosis of carotid artery, Toe osteomyelitis, left ([facility]), and Type 1 diabetes mellitus with chronic kidney disease ([facility]).    Surgical History:   has a past surgical history that includes Cardiac surgery (2001); Coronary artery bypass  data  High risk due to acute illness, evaluation of drug-drug interactions, medication management and diagnostic interventions    Assessment  Patient Active Problem List   Diagnosis    HLD (hyperlipidemia)    PVD (peripheral vascular disease) (HCC)    Chronic systolic heart failure (HCC)    ESRD on peritoneal dialysis (HCC)    Seizures (HCC)    Pancytopenia (HCC)    Coronary artery disease due to lipid rich plaque    HTN (hypertension), benign    Type 1 diabetes mellitus (HCC)    Cardiomyopathy (HCC)    Coronary artery disease of autologous vein bypass graft with stable angina pectoris (HCC)    Anemia due to stage 5 chronic kidney disease, not on chronic dialysis (HCC)    Chest pain         I had the opportunity to review the clinical symptoms and presentation of Te ZENA Garay.     CAD s/p CABG Patent LIMA, 90% stenosis SVG to OM  ICM EF 20%  ESRD  DM-I  NSTEMI  - [facility] as above 6/2024 preferred medical management at that time  - trop elevation this admission to 400's with CP. Downtrending  - continue entresto 49/51 BID, coreg 12.5mg BID  - continue ASA, plavix, imdur, ranexa, crestor 40  - start heparin gtt for NSTEMI medical management x48 hours  - TTE pending  - NPO after MN in the event invasive approach is pursued      All questions and concerns were addressed to the patient/family. Alternatives to my treatment were discussed. The note was completed using EMR. Every effort was made to ensure accuracy; however, inadvertent computerized transcription errors may be present.  Chico Leblanc MD, MD 7/28/2024 10:15 AM

## 2024-07-28 NOTE — H&P
V2.0  History and Physical      Name:  Te Garay /Age/Sex: 1965  (59 y.o. male)   MRN & CSN:  [id number] & [id number] Encounter Date/Time: 2024 3:40 AM EDT   Location:  [id number]/5916-01 PCP: Reid Lei MD       Hospital Day: 1    Assessment and Plan:   Te Garay is a 59 y.o. male with a pmh of CAD, CHF, end-stage renal disease on peritoneal dialysis, type 1 diabetes, hypertension  who presents with Chest pain    Hospital Problems             Last Modified POA    * (Principal) Chest pain 2024 Yes       Plan:  # Chest pain:  -Rule out ACS  - Patient presented with chief complaint of chest pain.  Mentions that his chest pain started last night.  Reports left-sided chest pain radiating to his neck.  -In the ED, patient was hemodynamically stable.  -Initial troponin was 469.  Repeat was 366.  -EKG no ST elevation.  -Continue home aspirin, Plavix, carvedilol, statin, ranolazine, Imdur  -Cardiology consulted    # CAD :  -Continue home meds    # Hyperglycemia:  # History of type 1 diabetes mellitus:  -Lantus 27 units daily, lispro 8 units 3 times daily, medium dose sliding scale  -Hypoglycemic protocol  -Patient is on insulin pump.  Adjust insulin as needed.    # ESRD on peritoneal dialysis:  # CHF:  -Nephrology consulted    # Hypertension:  -Continue home meds    Disposition:   Current Living situation: Home  Expected Disposition: Home  Estimated D/C: 2 to 3 days    Diet Diet NPO Exceptions are: Sips of Water with Meds   DVT Prophylaxis [] Lovenox, [x]  Heparin, [] SCDs, [] Ambulation,  [] Eliquis, [] Xarelto, [] Coumadin   Code Status Full Code   Surrogate Decision Maker/ POA Chest pain     Personally reviewed Lab Studies and Imaging     Discussed management of the case with ED attending who recommended to admit patient for chest pain.    EKG interpreted personally and results no ST elevation.    Imaging that was interpreted personally includes chest x-ray and results no cardiopulmonary          This note was likely completed using voice recognition technology and may contain unintended errors.     Electronically signed by Dae Jason MD on 7/28/2024 at 5:58 AM

## 2024-07-28 NOTE — CARE COORDINATION
Case Management Assessment  Initial Evaluation    Date/Time of Evaluation: 7/28/2024 12:17 PM  Assessment Completed by: ROBIN Roldan    If patient is discharged prior to next notation, then this note serves as note for discharge by case management.    Patient Name: Te Garay                   YOB: 1965  Diagnosis: Chest pain [R07.9]  Hyperglycemia [R73.9]  Elevated troponin [R79.89]  ESRD on peritoneal dialysis (HCC) [N18.6, Z99.2]  Chest pain, unspecified type [R07.9]                   Date / Time: 7/28/2024 12:26 AM    Patient Admission Status: Inpatient   Readmission Risk (Low < 19, Mod (19-27), High > 27): Readmission Risk Score: 40    Current PCP: Reid Lei MD  PCP verified by CM? Yes    Chart Reviewed: Yes      History Provided by: Patient  Patient Orientation: Alert and Oriented    Patient Cognition: Alert    Hospitalization in the last 30 days (Readmission):  Yes    If yes, Readmission Assessment in CM Navigator will be completed.    Advance Directives:      Code Status: Full Code   Patient's Primary Decision Maker is: Named in Scanned ACP Document      Discharge Planning:    Patient lives with: Spouse/Significant Other Type of Home: House  Primary Care Giver: Self  Patient Support Systems include: Spouse/Significant Other, Children, Family Members   Current Financial resources: Medicare  Current community resources: None  Current services prior to admission: Home Care, Durable Medical Equipment            Current DME: Walker, Wheelchair            Type of Home Care services:  PT, Nursing Services, OT    ADLS  Prior functional level: Independent in ADLs/IADLs  Current functional level: Independent in ADLs/IADLs    PT AM-PAC:   /24  OT AM-PAC:   /24    Family can provide assistance at DC: Yes  Would you like Case Management to discuss the discharge plan with any other family members/significant others, and if so, who? No  Plans to Return to Present Housing: Yes  Other Identified  Issues/Barriers to RETURNING to current housing: Yes  Potential Assistance needed at discharge: Home Care            Potential DME:    Patient expects to discharge to: House  Plan for transportation at discharge:      Financial    Payor: MEDICARE / Plan: MEDICARE PART A AND B / Product Type: *No Product type* /     Does insurance require precert for SNF: No    Potential assistance Purchasing Medications: No  Meds-to-Beds request:        [facility] - [place], [place] - [address] - P [phone] - F [phone]  [address]  [address]  [facility] [address]  Phone: [phone] Fax: [phone]    [id number] [place], [place] - [address] - P [phone] - F [phone]  [address]  [facility] [address]  Phone: [phone] Fax: [phone]    [id number] [place], [place] - [address] - P [phone] - F [phone]  [address]  [facility] [address]  Phone: [phone] Fax: [phone]      Notes:    Factors facilitating achievement of predicted outcomes: Motivated, Cooperative, and Pleasant        Additional Case Management Notes: CM spoke with pt at bedside. Pt is from home with [facility]. Pt lives with his wife.    Pt's cane was misplaced with the EMT. Pt may need a cane upon d/c.    Pt's wife is to transport pt home upon d/c.            The Plan for Transition of Care is related to the following treatment goals of Chest pain [R07.9]  Hyperglycemia [R73.9]  Elevated troponin [R79.89]  ESRD on peritoneal dialysis (HCC) [N18.6, Z99.2]  Chest pain, unspecified type [R07.9]    IF APPLICABLE: The Patient and/or patient representative Te and his family were provided with a choice of provider and agrees with the discharge plan. Freedom of choice list with basic dialogue that supports the patient's individualized plan of care/goals and shares the quality data associated with the providers was provided to:     Patient

## 2024-07-28 NOTE — PROGRESS NOTES
Pt has own Insulin pump. Pt disconnected Insulin pump and is turned off d/t he thinks it is leaking. States he will use our sliding scale Insulin. Will monitor.

## 2024-07-28 NOTE — CONSULTS
[facility]   Nephrology progress Note  [phone]  [phone]   [employer]         Reason for Consult:  ESRD    HISTORY OF PRESENT ILLNESS:      The patient is a 59 y.o. male with significant past medical history of ESRD on PD, hypertension, CAD, who presents with chest pain. Didn't get PD last night. No complains at the time of my evaluation. Follows with Dr. Allen outpatient.         Past Medical History:        Diagnosis Date    Angina at rest ([facility])     Cardiomyopathy ([facility])     Carotid artery stenosis 10/25/2016    SUZANNA stented with 8 x 30 mm non-tapered Xact stent    Charcot foot due to diabetes mellitus ([facility]) 07/01/2024    CHF (congestive heart failure) ([facility]) 03/03/2015    EF 30%     CKD (chronic kidney disease) stage 2, GFR 60-89 ml/min     Coronary artery disease     sp CABG UC    Coronary artery disease involving native heart with angina pectoris ([facility]) 05/26/2010    Formatting of this note might be different from the original.   s/p CABG      Last Assessment & Plan:    Formatting of this note might be different from the original.   s/p PTCA and stent during 04/24 hosptialization.        Cardiology (Dr. Celestin)  has told him that he will get cardiac clearance, but he needs to have angioplasty at some point because there is a small lesion that needs to be addr    De Quervain thyroiditis 06/08/2023    Last Assessment & Plan:    Formatting of this note might be different from the original.   He has left texting thumb x 3 months.  This is not carpal tunnel.  Evidently, he has texted more over the last 1.5 years than before because his wife would like a more immediate response.  He cannot receive NSAIDS, and I cannot give a steroid injection.  Review of the UpToDate does state that immobility does    Diabetic peripheral neuropathy ([facility])     Diabetic polyneuropathy associated with type 1 diabetes mellitus ([facility]) 07/01/2024    Diastolic HF (heart failure) ([facility])     Essential  Not on file   Occupational History    Occupation: IT   Tobacco Use    Smoking status: Never    Smokeless tobacco: Never   Vaping Use    Vaping Use: Never used   Substance and Sexual Activity    Alcohol use: Not Currently     Comment: one drink a month    Drug use: No    Sexual activity: Yes     Partners: Female   Other Topics Concern    Not on file   Social History Narrative    Not on file     Social Determinants of Health     Financial Resource Strain: Not on file   Food Insecurity: No Food Insecurity (7/16/2024)    Hunger Vital Sign     Worried About Running Out of Food in the Last Year: Never true     Ran Out of Food in the Last Year: Never true   Transportation Needs: Unmet Transportation Needs (7/16/2024)    PRAPARE - Transportation     Lack of Transportation (Medical): Yes     Lack of Transportation (Non-Medical): Yes   Physical Activity: Not on file   Stress: Not on file   Social Connections: Not on file   Intimate Partner Violence: Not on file   Housing Stability: Low Risk  (7/16/2024)    Housing Stability Vital Sign     Unable to Pay for Housing in the Last Year: No     Number of Places Lived in the Last Year: 1     Unstable Housing in the Last Year: No       Family History:       Problem Relation Age of Onset    Hypertension Mother     Seizures Mother         epilepsy    Coronary Art Dis Father     Diabetes Father     Heart Murmur Sister     Thyroid Disease Sister     Mult Sclerosis Brother     Arthritis Brother     Sleep Apnea Brother     Coronary Art Dis Paternal Grandmother     Diabetes Paternal Grandmother        REVIEW OF SYSTEMS:    10 pt ROS done, relevant features as in [place], rest negative       PHYSICAL EXAM:    Vitals:    BP (!) 141/59   Pulse 82   Temp 98.7 °F (37.1 °C) (Temporal)   Resp 16   Ht 1.727 m (5' 8\")   Wt 85.3 kg (188 lb 0.8 oz)   SpO2 100%   BMI 28.59 kg/m²   No intake or output data in the 24 hours ending 07/28/24 1039    Physical Exam:  CONSTITUTIONAL/PSYCHIATRY: awake,

## 2024-07-28 NOTE — ED NOTES
ED TO INPATIENT SBAR HANDOFF    Patient Name: Te Garay   :  1965  59 y.o.   MRN:  [id number]  Preferred Name  Te  ED Room #:  ED-0012/12  Family/Caregiver Present yes   Restraints no   Sitter no   Sepsis Risk Score      Situation  Code Status: Prior No additional code details.    Allergies: Iodides, Shellfish-derived products, Lipitor [atorvastatin], and Atorvastatin calcium  Weight: Patient Vitals for the past 96 hrs (Last 3 readings):   Weight   24 0031 82 kg (180 lb 12.8 oz)     Arrived from: home  Chief Complaint:   Chief Complaint   Patient presents with    Chest Pain     Pt arrived via [facility] from w c/o L sided CP (7/10) w radiation to L arm, neck and shoulder that started during his home dialysis. Pt received ASA x 3 en route     Hospital Problem/Diagnosis:  Active Problems:    * No active hospital problems. *  Resolved Problems:    * No resolved hospital problems. *    Imaging:   XR CHEST PORTABLE   Final Result   There is no evidence of acute chest disease.           Abnormal labs:   Abnormal Labs Reviewed   CBC WITH AUTO DIFFERENTIAL - Abnormal; Notable for the following components:       Result Value    RBC 3.81 (*)     Hemoglobin 11.5 (*)     Hematocrit 35.2 (*)     All other components within normal limits   COMPREHENSIVE METABOLIC PANEL W/ REFLEX TO MG FOR LOW K - Abnormal; Notable for the following components:    Sodium 133 (*)     Chloride 95 (*)     CO2 19 (*)     Anion Gap 19 (*)     Glucose 406 (*)     BUN 61 (*)     Creatinine 9.8 (*)     Est, Glom Filt Rate 6 (*)     All other components within normal limits    Narrative:     CALL  Smith  [facility] tel. [phone],  Chemistry results called to and read back by LAURA Oviedo, 2024  02:18[facility]   TROPONIN - Abnormal; Notable for the following components:    Troponin, High Sensitivity 469 (*)     All other components within normal limits    Narrative:     CALL  Smith  [facility] tel. [phone],  Chemistry results called  to and read back by LAURA Oviedo, 07/28/2024  02:18, by [facility]   TROPONIN - Abnormal; Notable for the following components:    Troponin, High Sensitivity 366 (*)     All other components within normal limits   BRAIN NATRIURETIC PEPTIDE - Abnormal; Notable for the following components:    Pro-BNP 9,062 (*)     All other components within normal limits    Narrative:     CALL  Smith  [facility] tel. [phone],  Chemistry results called to and read back by LAURA Oviedo, 07/28/2024  02:18, by [facility]   POCT GLUCOSE - Abnormal; Notable for the following components:    POC Glucose 496 (*)     All other components within normal limits     Critical values: yes     Abnormal Assessment Findings: Peritoneal dialysis pt    Background  History:   Past Medical History:   Diagnosis Date    Angina at rest ([facility]ly [facility])     Cardiomyopathy (Formerly [facility])     Carotid artery stenosis 10/25/2016    SUZANNA stented with 8 x 30 mm non-tapered Xact stent    Charcot foot due to diabetes mellitus ([facility]ly [facility]) 07/01/2024    CHF (congestive heart failure) (Formerly [facility]) 03/03/2015    EF 30%     CKD (chronic kidney disease) stage 2, GFR 60-89 ml/min     Coronary artery disease     sp CABG UC    Coronary artery disease involving native heart with angina pectoris (Formerly [facility]) 05/26/2010    Formatting of this note might be different from the original.   s/p CABG      Last Assessment & Plan:    Formatting of this note might be different from the original.   s/p PTCA and stent during 04/24 hosptialization.        Cardiology (Dr. Celestin)  has told him that he will get cardiac clearance, but he needs to have angioplasty at some point because there is a small lesion that needs to be addr    De Quervain thyroiditis 06/08/2023    Last Assessment & Plan:    Formatting of this note might be different from the original.   He has left texting thumb x 3 months.  This is not carpal tunnel.  Evidently, he has texted more over the last 1.5 years than before because his wife would like a more

## 2024-07-29 ENCOUNTER — APPOINTMENT (OUTPATIENT)
Age: 59
DRG: 321 | End: 2024-07-29
Attending: INTERNAL MEDICINE
Payer: MEDICARE

## 2024-07-29 LAB
ANION GAP SERPL CALCULATED.3IONS-SCNC: 15 MMOL/L (ref 3–16)
ANTI-XA UNFRAC HEPARIN: 0.3 IU/ML (ref 0.3–0.7)
ANTI-XA UNFRAC HEPARIN: 0.36 IU/ML (ref 0.3–0.7)
BUN SERPL-MCNC: 66 MG/DL (ref 7–20)
CALCIUM SERPL-MCNC: 8.7 MG/DL (ref 8.3–10.6)
CHLORIDE SERPL-SCNC: 102 MMOL/L (ref 99–110)
CO2 SERPL-SCNC: 23 MMOL/L (ref 21–32)
CREAT SERPL-MCNC: 9.1 MG/DL (ref 0.9–1.3)
DEPRECATED RDW RBC AUTO: 14.7 % (ref 12.4–15.4)
ECHO AV AREA PEAK VELOCITY: 1.7 CM2
ECHO AV AREA VTI: 1.6 CM2
ECHO AV AREA/BSA PEAK VELOCITY: 0.9 CM2/M2
ECHO AV AREA/BSA VTI: 0.8 CM2/M2
ECHO AV MEAN GRADIENT: 6 MMHG
ECHO AV MEAN VELOCITY: 1.2 M/S
ECHO AV PEAK GRADIENT: 11 MMHG
ECHO AV PEAK VELOCITY: 1.7 M/S
ECHO AV VELOCITY RATIO: 0.47
ECHO AV VTI: 33.9 CM
ECHO BSA: 1.99 M2
ECHO EST RA PRESSURE: 3 MMHG
ECHO IVC PROX: 1.5 CM
ECHO LA AREA 2C: 18.9 CM2
ECHO LA AREA 4C: 18.3 CM2
ECHO LA MAJOR AXIS: 5.9 CM
ECHO LA MINOR AXIS: 5.7 CM
ECHO LA VOL BP: 49 ML (ref 18–58)
ECHO LA VOL MOD A2C: 50 ML (ref 18–58)
ECHO LA VOL MOD A4C: 47 ML (ref 18–58)
ECHO LA VOL/BSA BIPLANE: 25 ML/M2 (ref 16–34)
ECHO LA VOLUME INDEX MOD A2C: 26 ML/M2 (ref 16–34)
ECHO LA VOLUME INDEX MOD A4C: 24 ML/M2 (ref 16–34)
ECHO LV E' LATERAL VELOCITY: 13 CM/S
ECHO LV E' SEPTAL VELOCITY: 7 CM/S
ECHO LV EDV A2C: 154 ML
ECHO LV EDV A4C: 114 ML
ECHO LV EDV INDEX A4C: 58 ML/M2
ECHO LV EDV NDEX A2C: 79 ML/M2
ECHO LV EJECTION FRACTION A2C: 39 %
ECHO LV EJECTION FRACTION A4C: 33 %
ECHO LV EJECTION FRACTION BIPLANE: 36 % (ref 55–100)
ECHO LV ESV A2C: 95 ML
ECHO LV ESV A4C: 77 ML
ECHO LV ESV INDEX A2C: 48 ML/M2
ECHO LV ESV INDEX A4C: 39 ML/M2
ECHO LV FRACTIONAL SHORTENING: 13 % (ref 28–44)
ECHO LV INTERNAL DIMENSION DIASTOLE INDEX: 2.7 CM/M2
ECHO LV INTERNAL DIMENSION DIASTOLIC: 5.3 CM (ref 4.2–5.9)
ECHO LV INTERNAL DIMENSION SYSTOLIC INDEX: 2.35 CM/M2
ECHO LV INTERNAL DIMENSION SYSTOLIC: 4.6 CM
ECHO LV IVSD: 1.1 CM (ref 0.6–1)
ECHO LV MASS 2D: 213.9 G (ref 88–224)
ECHO LV MASS INDEX 2D: 109.1 G/M2 (ref 49–115)
ECHO LV POSTERIOR WALL DIASTOLIC: 1 CM (ref 0.6–1)
ECHO LV RELATIVE WALL THICKNESS RATIO: 0.38
ECHO LVOT AREA: 3.5 CM2
ECHO LVOT AV VTI INDEX: 0.46
ECHO LVOT DIAM: 2.1 CM
ECHO LVOT MEAN GRADIENT: 1 MMHG
ECHO LVOT PEAK GRADIENT: 3 MMHG
ECHO LVOT PEAK VELOCITY: 0.8 M/S
ECHO LVOT STROKE VOLUME INDEX: 27.4 ML/M2
ECHO LVOT SV: 53.7 ML
ECHO LVOT VTI: 15.5 CM
ECHO MV A VELOCITY: 1.16 M/S
ECHO MV E DECELERATION TIME (DT): 183 MS
ECHO MV E VELOCITY: 0.88 M/S
ECHO MV E/A RATIO: 0.76
ECHO MV E/E' LATERAL: 6.77
ECHO MV E/E' RATIO (AVERAGED): 9.67
ECHO MV E/E' SEPTAL: 12.57
ECHO MV REGURGITANT PEAK GRADIENT: 85 MMHG
ECHO MV REGURGITANT PEAK VELOCITY: 4.6 M/S
ECHO PULMONARY ARTERY END DIASTOLIC PRESSURE: 5 MMHG
ECHO PV REGURGITANT MAX VELOCITY: 1.1 M/S
ECHO RIGHT VENTRICULAR SYSTOLIC PRESSURE (RVSP): 32 MMHG
ECHO TV REGURGITANT MAX VELOCITY: 2.69 M/S
ECHO TV REGURGITANT PEAK GRADIENT: 29 MMHG
GFR SERPLBLD CREATININE-BSD FMLA CKD-EPI: 6 ML/MIN/{1.73_M2}
GLUCOSE BLD-MCNC: 152 MG/DL (ref 70–99)
GLUCOSE BLD-MCNC: 167 MG/DL (ref 70–99)
GLUCOSE BLD-MCNC: 209 MG/DL (ref 70–99)
GLUCOSE BLD-MCNC: 221 MG/DL (ref 70–99)
GLUCOSE BLD-MCNC: 231 MG/DL (ref 70–99)
GLUCOSE BLD-MCNC: 65 MG/DL (ref 70–99)
GLUCOSE BLD-MCNC: 69 MG/DL (ref 70–99)
GLUCOSE BLD-MCNC: 98 MG/DL (ref 70–99)
GLUCOSE SERPL-MCNC: 200 MG/DL (ref 70–99)
HCT VFR BLD AUTO: 33.1 % (ref 40.5–52.5)
HGB BLD-MCNC: 10.6 G/DL (ref 13.5–17.5)
MCH RBC QN AUTO: 29.5 PG (ref 26–34)
MCHC RBC AUTO-ENTMCNC: 32.2 G/DL (ref 31–36)
MCV RBC AUTO: 91.7 FL (ref 80–100)
PERFORMED ON: ABNORMAL
PERFORMED ON: NORMAL
PHOSPHATE SERPL-MCNC: 4.5 MG/DL (ref 2.5–4.9)
PLATELET # BLD AUTO: 187 K/UL (ref 135–450)
PMV BLD AUTO: 10.2 FL (ref 5–10.5)
POTASSIUM SERPL-SCNC: 4.2 MMOL/L (ref 3.5–5.1)
RBC # BLD AUTO: 3.61 M/UL (ref 4.2–5.9)
SODIUM SERPL-SCNC: 140 MMOL/L (ref 136–145)
WBC # BLD AUTO: 4.5 K/UL (ref 4–11)

## 2024-07-29 PROCEDURE — 6370000000 HC RX 637 (ALT 250 FOR IP): Performed by: NURSE PRACTITIONER

## 2024-07-29 PROCEDURE — 90945 DIALYSIS ONE EVALUATION: CPT

## 2024-07-29 PROCEDURE — 1200000000 HC SEMI PRIVATE

## 2024-07-29 PROCEDURE — 99232 SBSQ HOSP IP/OBS MODERATE 35: CPT | Performed by: STUDENT IN AN ORGANIZED HEALTH CARE EDUCATION/TRAINING PROGRAM

## 2024-07-29 PROCEDURE — 6370000000 HC RX 637 (ALT 250 FOR IP): Performed by: STUDENT IN AN ORGANIZED HEALTH CARE EDUCATION/TRAINING PROGRAM

## 2024-07-29 PROCEDURE — 93325 DOPPLER ECHO COLOR FLOW MAPG: CPT | Performed by: INTERNAL MEDICINE

## 2024-07-29 PROCEDURE — 93308 TTE F-UP OR LMTD: CPT | Performed by: INTERNAL MEDICINE

## 2024-07-29 PROCEDURE — 6360000004 HC RX CONTRAST MEDICATION: Performed by: INTERNAL MEDICINE

## 2024-07-29 PROCEDURE — 90935 HEMODIALYSIS ONE EVALUATION: CPT

## 2024-07-29 PROCEDURE — 93321 DOPPLER ECHO F-UP/LMTD STD: CPT | Performed by: INTERNAL MEDICINE

## 2024-07-29 PROCEDURE — 94760 N-INVAS EAR/PLS OXIMETRY 1: CPT

## 2024-07-29 PROCEDURE — 6360000002 HC RX W HCPCS: Performed by: INTERNAL MEDICINE

## 2024-07-29 PROCEDURE — 85520 HEPARIN ASSAY: CPT

## 2024-07-29 PROCEDURE — 85027 COMPLETE CBC AUTOMATED: CPT

## 2024-07-29 PROCEDURE — 80048 BASIC METABOLIC PNL TOTAL CA: CPT

## 2024-07-29 PROCEDURE — C8924 2D TTE W OR W/O FOL W/CON,FU: HCPCS

## 2024-07-29 PROCEDURE — 84100 ASSAY OF PHOSPHORUS: CPT

## 2024-07-29 PROCEDURE — 36415 COLL VENOUS BLD VENIPUNCTURE: CPT

## 2024-07-29 PROCEDURE — 93005 ELECTROCARDIOGRAM TRACING: CPT | Performed by: INTERNAL MEDICINE

## 2024-07-29 PROCEDURE — 2580000003 HC RX 258: Performed by: STUDENT IN AN ORGANIZED HEALTH CARE EDUCATION/TRAINING PROGRAM

## 2024-07-29 RX ORDER — INSULIN LISPRO 100 [IU]/ML
4 INJECTION, SOLUTION INTRAVENOUS; SUBCUTANEOUS
Status: DISCONTINUED | OUTPATIENT
Start: 2024-07-29 | End: 2024-08-01

## 2024-07-29 RX ORDER — PROCHLORPERAZINE 25 MG/1
1 SUPPOSITORY RECTAL ONCE
Qty: 1 EACH | Refills: 0 | Status: SHIPPED | OUTPATIENT
Start: 2024-07-29 | End: 2024-07-29

## 2024-07-29 RX ADMIN — SACUBITRIL AND VALSARTAN 1 TABLET: 49; 51 TABLET, FILM COATED ORAL at 20:02

## 2024-07-29 RX ADMIN — INSULIN LISPRO 8 UNITS: 100 INJECTION, SOLUTION INTRAVENOUS; SUBCUTANEOUS at 12:06

## 2024-07-29 RX ADMIN — CARVEDILOL 12.5 MG: 6.25 TABLET, FILM COATED ORAL at 09:06

## 2024-07-29 RX ADMIN — LEVETIRACETAM 500 MG: 500 TABLET, FILM COATED ORAL at 09:06

## 2024-07-29 RX ADMIN — INSULIN LISPRO 2 UNITS: 100 INJECTION, SOLUTION INTRAVENOUS; SUBCUTANEOUS at 09:06

## 2024-07-29 RX ADMIN — SODIUM CHLORIDE, PRESERVATIVE FREE 10 ML: 5 INJECTION INTRAVENOUS at 20:03

## 2024-07-29 RX ADMIN — CARVEDILOL 12.5 MG: 6.25 TABLET, FILM COATED ORAL at 17:30

## 2024-07-29 RX ADMIN — ROSUVASTATIN 40 MG: 20 TABLET, FILM COATED ORAL at 17:31

## 2024-07-29 RX ADMIN — INSULIN GLARGINE 27 UNITS: 100 INJECTION, SOLUTION SUBCUTANEOUS at 09:07

## 2024-07-29 RX ADMIN — ASPIRIN 81 MG: 81 TABLET, COATED ORAL at 09:06

## 2024-07-29 RX ADMIN — SACUBITRIL AND VALSARTAN 1 TABLET: 49; 51 TABLET, FILM COATED ORAL at 09:14

## 2024-07-29 RX ADMIN — CLOPIDOGREL BISULFATE 75 MG: 75 TABLET ORAL at 09:06

## 2024-07-29 RX ADMIN — PERFLUTREN 1.5 ML: 6.52 INJECTION, SUSPENSION INTRAVENOUS at 13:51

## 2024-07-29 RX ADMIN — INSULIN LISPRO 4 UNITS: 100 INJECTION, SOLUTION INTRAVENOUS; SUBCUTANEOUS at 17:30

## 2024-07-29 RX ADMIN — HEPARIN SODIUM 11 UNITS/KG/HR: 10000 INJECTION, SOLUTION INTRAVENOUS at 14:02

## 2024-07-29 RX ADMIN — RANOLAZINE 500 MG: 500 TABLET, EXTENDED RELEASE ORAL at 09:06

## 2024-07-29 RX ADMIN — INSULIN LISPRO 2 UNITS: 100 INJECTION, SOLUTION INTRAVENOUS; SUBCUTANEOUS at 12:07

## 2024-07-29 RX ADMIN — RANOLAZINE 500 MG: 500 TABLET, EXTENDED RELEASE ORAL at 20:02

## 2024-07-29 RX ADMIN — ISOSORBIDE MONONITRATE 30 MG: 30 TABLET, EXTENDED RELEASE ORAL at 20:02

## 2024-07-29 RX ADMIN — FUROSEMIDE 40 MG: 40 TABLET ORAL at 09:06

## 2024-07-29 ASSESSMENT — PAIN SCALES - GENERAL
PAINLEVEL_OUTOF10: 0
PAINLEVEL_OUTOF10: 0

## 2024-07-29 NOTE — PROGRESS NOTES
Patient currently does not have a CGM device. Patient states he removed his CGM during his last admission for MRI. Patient states per his insurance he does not qualify for another CGM until Tuesday 8/6. Diabetic educator requested to see patient and advise on new CGM device while awaiting insurance approval.

## 2024-07-29 NOTE — PROGRESS NOTES
Patient called out stating \"I feel like my sugar is low\". Patient already drinking sprite provided by wife at bedside. BG 65 at 1454. Patient oriented but lethargic, and diaphoretic. Patient drank 8 oz orange juice, 8 oz apple juice and consumed juan crackers with peanut butter. Recheck at 1522 BG 98. Patient alert and oriented now. Wife at bedside. Hospitalist notified of hypoglycemic event. New prandial insulin orders acknowledged.

## 2024-07-29 NOTE — PLAN OF CARE
Problem: Discharge Planning  Goal: Discharge to home or other facility with appropriate resources  7/29/2024 1143 by Edouard Ferrera RN  Outcome: Progressing     Problem: Pain  Goal: Verbalizes/displays adequate comfort level or baseline comfort level  7/29/2024 1143 by Edouard Ferrera RN  Outcome: Progressing     Problem: Safety - Adult  Goal: Free from fall injury  7/29/2024 1143 by Edouard Ferrera RN  Outcome: Progressing    Problem: Chronic Conditions and Co-morbidities  Goal: Patient's chronic conditions and co-morbidity symptoms are monitored and maintained or improved  7/29/2024 1143 by Edouard Ferrera RN  Outcome: Progressing

## 2024-07-29 NOTE — PROGRESS NOTES
[facility]    7/29/2024 11:46 AM        Name: Te Garay .              Admitted: 7/28/2024  Primary Care Provider: Reid Lei MD (Tel: [phone])      Chief complaint:  58 yo male with hx CAD, CHF, ESRD on PD, DM1, HTN. He presented to ER with c/o left sided chest pain which radiated to left shoulder, neck and jaw.  Troponin was significantly elevated from baseline. Admitted to r/o ACS.     Subjective:  Up in bedside chair. States he feels good. No further chest or jaw pain, shortness of breath. Denies lightheadedness, dizziness, palpitations, abdominal pain, nausea.     Reviewed interval ancillary notes    Current Medications  aspirin EC tablet 81 mg, Daily  carvedilol (COREG) tablet 12.5 mg, BID WC  clopidogrel (PLAVIX) tablet 75 mg, Daily  furosemide (LASIX) tablet 40 mg, BID  isosorbide mononitrate (IMDUR) extended release tablet 30 mg, Nightly  levETIRAcetam (KEPPRA) tablet 500 mg, Daily  ranolazine (RANEXA) extended release tablet 500 mg, BID  rosuvastatin (CRESTOR) tablet 40 mg, QPM  sacubitril-valsartan (ENTRESTO) 49-51 MG per tablet 1 tablet, BID  sodium chloride flush 0.9 % injection 5-40 mL, 2 times per day  sodium chloride flush 0.9 % injection 5-40 mL, PRN  0.9 % sodium chloride infusion, PRN  ondansetron (ZOFRAN-ODT) disintegrating tablet 4 mg, Q8H PRN   Or  ondansetron (ZOFRAN) injection 4 mg, Q6H PRN  acetaminophen (TYLENOL) tablet 650 mg, Q6H PRN   Or  acetaminophen (TYLENOL) suppository 650 mg, Q6H PRN  polyethylene glycol (GLYCOLAX) packet 17 g, Daily PRN  insulin lispro (HUMALOG,ADMELOG) injection vial 0-8 Units, TID WC  insulin lispro (HUMALOG,ADMELOG) injection vial 0-4 Units, Nightly  dextrose bolus 10% 125 mL, PRN   Or  dextrose bolus 10% 250 mL, PRN  glucagon injection 1 mg, PRN  dextrose 10 % infusion, Continuous PRN  insulin glargine (LANTUS) injection vial 27 Units,

## 2024-07-29 NOTE — PROGRESS NOTES
[facility] Daily Progress Note      Admit Date:  7/28/2024    Chief Complaint:  Chest pain    Subjective:  Mr. Garay feels better today. No new complaints. Reviewed his clinical course, recent C, with him and his primary cardiologist Dr. Celestin. See A/P below for plans.     Objective:   BP (!) 136/56   Pulse 82   Temp 97.6 °F (36.4 °C) (Temporal)   Resp 18   Ht 1.727 m (5' 8\")   Wt 82.6 kg (182 lb)   SpO2 100%   BMI 27.67 kg/m²     Intake/Output Summary (Last 24 hours) at 7/29/2024 1251  Last data filed at 7/29/2024 1155  Gross per 24 hour   Intake 250 ml   Output 1150 ml   Net -900 ml       Physical Exam:  General:  Awake, alert, NAD  Skin:  Warm and dry  Neck:  JVD not visualized sitting upright  Chest:  CTAB, Comfortable   Cardiovascular:  RRR S1S2, no S3, No murmur  Abdomen:  Soft, ND, NT, No HSM  Extremities:  No edema    Medications:    aspirin  81 mg Oral Daily    carvedilol  12.5 mg Oral BID WC    clopidogrel  75 mg Oral Daily    furosemide  40 mg Oral BID    isosorbide mononitrate  30 mg Oral Nightly    levETIRAcetam  500 mg Oral Daily    ranolazine  500 mg Oral BID    rosuvastatin  40 mg Oral QPM    sacubitril-valsartan  1 tablet Oral BID    sodium chloride flush  5-40 mL IntraVENous 2 times per day    insulin lispro  0-8 Units SubCUTAneous TID WC    insulin lispro  0-4 Units SubCUTAneous Nightly    insulin glargine  27 Units SubCUTAneous Daily    insulin lispro  8 Units SubCUTAneous TID WC      sodium chloride      dextrose      heparin (PORCINE) Infusion 11 Units/kg/hr (07/29/24 0030)     sodium chloride flush, sodium chloride, ondansetron **OR** ondansetron, acetaminophen **OR** acetaminophen, polyethylene glycol, dextrose bolus **OR** dextrose bolus, glucagon (rDNA), dextrose, heparin (porcine), heparin (porcine)    TELEMETRY (Personally reviewed by me): Sinus     Lab Data:  CBC:   Recent Labs     07/28/24  0120 07/28/24  0518 07/29/24  0515   WBC 6.8 7.1 4.5   HGB 11.5* 10.7*  (PD with 3.75NC throughout)     Post Cath Dx:       Severe disease as above, culprit SVG     Cardiac cath 7/7/17  ANGIOGRAPHIC FINDINGS:  1.  The left main comes from left coronary cusp; distally it gives rise to left circumflex which goes into the OM1.  Rest of the circumflex is occluded.  Left anterior descending artery is occluded.  2.  Saphenous vein graft to obtuse marginal 2 is patent.  3.  Left internal mammary artery, left anterior descending artery is patent andprovides blood to the diagonal branches.  4.  Right coronary artery comes from right coronary cusp.  The posterolateral branch has 90% stenosis present.  Rest of the vessel is patent.  5.  LVEDP was 15 mmHg.  There was no aortic valve gradient     SUMMARY:  1.  Patent left internal mammary artery to left anterior descending artery  2.  Patent saphenous vein graft to obtuse marginal 2  3.  Non-occluded saphenous vein graft to the obtuse marginal.  4.  Right coronary artery in the posterolateral branch with 90% stenosis present.  5.  Occluded left anterior descending artery in the proximal segment  6.  Occluded mid left circumflex.     RECOMMENDATIONS:  1.  Continue postoperative care.  2.  Watch for bleeding.  3.  Aggressive risk factor modification.  4.  Continue aggressive antianginal therapy.  5.  If the patient remains symptomatic, we can consider intervention of right posterolateral branch.         Assessment/Plan:  CAD s/p CABG and PCI osf SVG-OM1 2021 and 4/2024. Repeat LHC 6/2024 with patent LIMA, 90% in-stent restenosis SVG-OM1   Discussed higher than average SVG PCI with him and his wife. Last LHC approx 4 weeks ago with significant disease in recently placed stent to distal SVG-OM. He has failed medical management, now with troponin further elevated than last admission. High risk PCI discussed previously, and at this point he feels he would like to proceed but would like to discuss with his primary cardiologist, Dr. Celestin, tomorrow. If he

## 2024-07-29 NOTE — PROGRESS NOTES
Disconnected from CCPD per protocol.  Effluent: clear  Total time: 9 hr 39 min   Total UF:  769 ml.  Total Volume:  6005 ml.  Dwell time gained:  0 hr 0 min.  Pt Tolerated procedure: well

## 2024-07-29 NOTE — PROGRESS NOTES
Nephrology Progress Note  [facility]  [phone]   [employer]    Patient:  Te Garay   : 1965    CC:  ESRD on PD     Subjective:  BP stable   No complains  Leg edema better   Makes good amt urine     Meds:  Scheduled Meds:   aspirin  81 mg Oral Daily    carvedilol  12.5 mg Oral BID WC    clopidogrel  75 mg Oral Daily    furosemide  40 mg Oral BID    isosorbide mononitrate  30 mg Oral Nightly    levETIRAcetam  500 mg Oral Daily    ranolazine  500 mg Oral BID    rosuvastatin  40 mg Oral QPM    sacubitril-valsartan  1 tablet Oral BID    sodium chloride flush  5-40 mL IntraVENous 2 times per day    insulin lispro  0-8 Units SubCUTAneous TID WC    insulin lispro  0-4 Units SubCUTAneous Nightly    insulin glargine  27 Units SubCUTAneous Daily    insulin lispro  8 Units SubCUTAneous TID WC     Continuous Infusions:   sodium chloride      dextrose      heparin (PORCINE) Infusion 11 Units/kg/hr (24 0030)     PRN Meds:.sodium chloride flush, sodium chloride, ondansetron **OR** ondansetron, acetaminophen **OR** acetaminophen, polyethylene glycol, dextrose bolus **OR** dextrose bolus, glucagon (rDNA), dextrose, heparin (porcine), heparin (porcine)    Vitals:  BP (!) 136/56   Pulse 82   Temp 97.6 °F (36.4 °C) (Temporal)   Resp 18   Ht 1.727 m (5' 8\")   Wt 82.6 kg (182 lb)   SpO2 100%   BMI 27.67 kg/m²     Physical Exam:  Gen: Resting in bed, NAD.    HEENT: MMM, OP clear.  CV: RRR, no S3.  Lungs: good respiratory effort and clear air entry   Abd: S/NT +BS , PD catheter +  Ext: No edema, no cyanosis  Skin: Warm.  No rashes appreciated.    Labs:  CBC:   Lab Results   Component Value Date/Time    WBC 4.5 2024 05:15 AM    RBC 3.61 2024 05:15 AM    HGB 10.6 2024 05:15 AM    HCT 33.1 2024 05:15 AM    MCV 91.7 2024 05:15 AM    MCH 29.5 2024 05:15 AM    MCHC 32.2 2024 05:15 AM    RDW 14.7 2024 05:15 AM     2024 05:15 AM     MPV 10.2 07/29/2024 05:15 AM     CMP:    Lab Results   Component Value Date/Time     07/29/2024 05:15 AM    K 4.2 07/29/2024 05:15 AM     07/29/2024 05:15 AM    CO2 23 07/29/2024 05:15 AM    BUN 66 07/29/2024 05:15 AM    CREATININE 9.1 07/29/2024 05:15 AM    GFRAA 9 10/03/2022 04:45 AM    GFRAA 44 06/15/2013 01:00 AM    AGRATIO 1.4 07/28/2024 01:20 AM    LABGLOM 6 07/29/2024 05:15 AM    LABGLOM 6 04/30/2024 04:51 AM    GLUCOSE 200 07/29/2024 05:15 AM    CALCIUM 8.7 07/29/2024 05:15 AM    BILITOT 0.3 07/28/2024 01:20 AM    ALKPHOS 90 07/28/2024 01:20 AM    AST 22 07/28/2024 01:20 AM    ALT 19 07/28/2024 01:20 AM       Assessment/Plan:  ESRD - follows with Dr. Allen, PD orders placed, 3 exchanges, 2.5% bags, 2L dwell volume, 9 hrs. Leg edema is better. Makes good amt urine . Hold off on lasix for now as plans for [facility] .      Chest pain - History of CAD/CABG and PCI. Last EF 20%.   On heparin drip. Noted plans for high risk PCI if okay with his primary cardiologist. TTE pending.      Hypertension - stable   Continue current.     IDDM     Anemia - hgb at goal for ESRD     AYSHA - monitor phos     Hx TIA - on socorro Bradley MD  [facility]  Office Number: [phone]  [employer]

## 2024-07-29 NOTE — PROGRESS NOTES
CCPD Order   Exchanges: 3   Exchange Volume: 2000 ml   Total Time: 9 hrs   Dextrose: 2.5%   Total Volume: 6000 ml     Orders verified. Supplies taken to pt's room. Report received. Cycler set up, primed and pre tested. Dressing changed on [facility] Cath site. Pt connected to cycler. CCPD initiated without problem. Initial effluent clear.       If problems should arise please call the [phone] number on top of PD cycler machine.

## 2024-07-29 NOTE — PROGRESS NOTES
3/29 was the last script we will prescribe again 4/27/21 [facility]  Diabetes Education   Progress Note       NAME:  Te Garay  MEDICAL RECORD NUMBER:  [id number]  AGE: 59 y.o.   GENDER: male  : 1965  TODAY'S DATE:  2024    Pt seen d/t CGM issue.    Pt frequently hospitalized and has had to remove 2 of his Dexcom G6 sensors early d/t medical tests. Insurance won't allow early refill of more sensors. Pt has labile blood sugars and uses insulin pump, making CGM data very important.    Advised pt and spouse to notify [employer] whenever a sensor falls off early or has to be removed for medical reasons and [employer] will usually mail a new sensor to pt. Pt v/u, states he wasn't aware of this process.    Order for Dexcom G6 sensor sent to [facility] outpatient pharmacy and financial counselor consulted to see if pt may qualify for sensor fill through [facility] program. Spoke with Bita in pharmacy to advise her of situation.    No DM educator coverage this Tues-Thurs but will leave note for covering DM educator to follow up with pt on Friday.    Electronically signed by Alba Osborne RN [facility][note 2] on 2024 at 3:53 PM

## 2024-07-29 NOTE — DIALYSIS
PD dressing site C/D/I. Dressing changed. Last BM 7/27. Initial drain bypass at 33 ml. Effluent clear yellow. Treatment initiated without complications or complaints from patient. Patient resting comfortably with VSS upon dialysis RN exit from room. Jameel Galvez RN notified of start of treatment and hotline number located on top of machine for any questions about troubleshooting during after hours.

## 2024-07-30 LAB
ANION GAP SERPL CALCULATED.3IONS-SCNC: 13 MMOL/L (ref 3–16)
ANTI-XA UNFRAC HEPARIN: 0.27 IU/ML (ref 0.3–0.7)
ANTI-XA UNFRAC HEPARIN: 0.5 IU/ML (ref 0.3–0.7)
ANTI-XA UNFRAC HEPARIN: 0.65 IU/ML (ref 0.3–0.7)
BUN SERPL-MCNC: 58 MG/DL (ref 7–20)
CALCIUM SERPL-MCNC: 8.8 MG/DL (ref 8.3–10.6)
CHLORIDE SERPL-SCNC: 102 MMOL/L (ref 99–110)
CO2 SERPL-SCNC: 24 MMOL/L (ref 21–32)
CREAT SERPL-MCNC: 8.4 MG/DL (ref 0.9–1.3)
DEPRECATED RDW RBC AUTO: 14.6 % (ref 12.4–15.4)
GFR SERPLBLD CREATININE-BSD FMLA CKD-EPI: 7 ML/MIN/{1.73_M2}
GLUCOSE BLD-MCNC: 169 MG/DL (ref 70–99)
GLUCOSE BLD-MCNC: 181 MG/DL (ref 70–99)
GLUCOSE BLD-MCNC: 204 MG/DL (ref 70–99)
GLUCOSE BLD-MCNC: 334 MG/DL (ref 70–99)
GLUCOSE BLD-MCNC: 63 MG/DL (ref 70–99)
GLUCOSE BLD-MCNC: 76 MG/DL (ref 70–99)
GLUCOSE SERPL-MCNC: 238 MG/DL (ref 70–99)
HCT VFR BLD AUTO: 35.6 % (ref 40.5–52.5)
HGB BLD-MCNC: 11.7 G/DL (ref 13.5–17.5)
MCH RBC QN AUTO: 30.1 PG (ref 26–34)
MCHC RBC AUTO-ENTMCNC: 32.7 G/DL (ref 31–36)
MCV RBC AUTO: 91.9 FL (ref 80–100)
PERFORMED ON: ABNORMAL
PERFORMED ON: NORMAL
PLATELET # BLD AUTO: 165 K/UL (ref 135–450)
PMV BLD AUTO: 9.4 FL (ref 5–10.5)
POTASSIUM SERPL-SCNC: 4.8 MMOL/L (ref 3.5–5.1)
RBC # BLD AUTO: 3.87 M/UL (ref 4.2–5.9)
SODIUM SERPL-SCNC: 139 MMOL/L (ref 136–145)
WBC # BLD AUTO: 5 K/UL (ref 4–11)

## 2024-07-30 PROCEDURE — 6370000000 HC RX 637 (ALT 250 FOR IP): Performed by: NURSE PRACTITIONER

## 2024-07-30 PROCEDURE — 6360000002 HC RX W HCPCS: Performed by: INTERNAL MEDICINE

## 2024-07-30 PROCEDURE — 2580000003 HC RX 258: Performed by: STUDENT IN AN ORGANIZED HEALTH CARE EDUCATION/TRAINING PROGRAM

## 2024-07-30 PROCEDURE — 1200000000 HC SEMI PRIVATE

## 2024-07-30 PROCEDURE — 90945 DIALYSIS ONE EVALUATION: CPT

## 2024-07-30 PROCEDURE — 85027 COMPLETE CBC AUTOMATED: CPT

## 2024-07-30 PROCEDURE — 6370000000 HC RX 637 (ALT 250 FOR IP): Performed by: STUDENT IN AN ORGANIZED HEALTH CARE EDUCATION/TRAINING PROGRAM

## 2024-07-30 PROCEDURE — 99233 SBSQ HOSP IP/OBS HIGH 50: CPT | Performed by: INTERNAL MEDICINE

## 2024-07-30 PROCEDURE — 6360000002 HC RX W HCPCS: Performed by: STUDENT IN AN ORGANIZED HEALTH CARE EDUCATION/TRAINING PROGRAM

## 2024-07-30 PROCEDURE — 85520 HEPARIN ASSAY: CPT

## 2024-07-30 PROCEDURE — 36415 COLL VENOUS BLD VENIPUNCTURE: CPT

## 2024-07-30 PROCEDURE — 80048 BASIC METABOLIC PNL TOTAL CA: CPT

## 2024-07-30 RX ORDER — HYDROCODONE BITARTRATE AND ACETAMINOPHEN 5; 325 MG/1; MG/1
1 TABLET ORAL EVERY 8 HOURS PRN
Status: COMPLETED | OUTPATIENT
Start: 2024-07-30 | End: 2024-08-01

## 2024-07-30 RX ORDER — MORPHINE SULFATE 2 MG/ML
2 INJECTION, SOLUTION INTRAMUSCULAR; INTRAVENOUS EVERY 6 HOURS PRN
Status: CANCELLED | OUTPATIENT
Start: 2024-07-30

## 2024-07-30 RX ORDER — MORPHINE SULFATE 2 MG/ML
2 INJECTION, SOLUTION INTRAMUSCULAR; INTRAVENOUS ONCE
Status: COMPLETED | OUTPATIENT
Start: 2024-07-30 | End: 2024-07-30

## 2024-07-30 RX ADMIN — SODIUM CHLORIDE, PRESERVATIVE FREE 10 ML: 5 INJECTION INTRAVENOUS at 20:45

## 2024-07-30 RX ADMIN — CARVEDILOL 12.5 MG: 6.25 TABLET, FILM COATED ORAL at 08:29

## 2024-07-30 RX ADMIN — RANOLAZINE 500 MG: 500 TABLET, EXTENDED RELEASE ORAL at 20:45

## 2024-07-30 RX ADMIN — ISOSORBIDE MONONITRATE 30 MG: 30 TABLET, EXTENDED RELEASE ORAL at 20:45

## 2024-07-30 RX ADMIN — ROSUVASTATIN 40 MG: 20 TABLET, FILM COATED ORAL at 17:29

## 2024-07-30 RX ADMIN — HEPARIN SODIUM 2000 UNITS: 1000 INJECTION INTRAVENOUS; SUBCUTANEOUS at 06:38

## 2024-07-30 RX ADMIN — MORPHINE SULFATE 2 MG: 2 INJECTION, SOLUTION INTRAMUSCULAR; INTRAVENOUS at 06:57

## 2024-07-30 RX ADMIN — HYDROCODONE BITARTRATE AND ACETAMINOPHEN 1 TABLET: 5; 325 TABLET ORAL at 17:29

## 2024-07-30 RX ADMIN — SACUBITRIL AND VALSARTAN 1 TABLET: 49; 51 TABLET, FILM COATED ORAL at 20:45

## 2024-07-30 RX ADMIN — ASPIRIN 81 MG: 81 TABLET, COATED ORAL at 08:29

## 2024-07-30 RX ADMIN — RANOLAZINE 500 MG: 500 TABLET, EXTENDED RELEASE ORAL at 08:29

## 2024-07-30 RX ADMIN — INSULIN LISPRO 4 UNITS: 100 INJECTION, SOLUTION INTRAVENOUS; SUBCUTANEOUS at 20:49

## 2024-07-30 RX ADMIN — CLOPIDOGREL BISULFATE 75 MG: 75 TABLET ORAL at 08:29

## 2024-07-30 RX ADMIN — INSULIN GLARGINE 27 UNITS: 100 INJECTION, SOLUTION SUBCUTANEOUS at 08:29

## 2024-07-30 RX ADMIN — ACETAMINOPHEN 650 MG: 325 TABLET ORAL at 04:33

## 2024-07-30 RX ADMIN — SACUBITRIL AND VALSARTAN 1 TABLET: 49; 51 TABLET, FILM COATED ORAL at 08:32

## 2024-07-30 RX ADMIN — HEPARIN SODIUM 13 UNITS/KG/HR: 10000 INJECTION, SOLUTION INTRAVENOUS at 14:39

## 2024-07-30 RX ADMIN — INSULIN LISPRO 4 UNITS: 100 INJECTION, SOLUTION INTRAVENOUS; SUBCUTANEOUS at 08:29

## 2024-07-30 RX ADMIN — INSULIN LISPRO 2 UNITS: 100 INJECTION, SOLUTION INTRAVENOUS; SUBCUTANEOUS at 08:29

## 2024-07-30 RX ADMIN — LEVETIRACETAM 500 MG: 500 TABLET, FILM COATED ORAL at 08:29

## 2024-07-30 RX ADMIN — CARVEDILOL 12.5 MG: 6.25 TABLET, FILM COATED ORAL at 17:29

## 2024-07-30 ASSESSMENT — PAIN SCALES - GENERAL
PAINLEVEL_OUTOF10: 6
PAINLEVEL_OUTOF10: 5
PAINLEVEL_OUTOF10: 4
PAINLEVEL_OUTOF10: 3
PAINLEVEL_OUTOF10: 0
PAINLEVEL_OUTOF10: 7
PAINLEVEL_OUTOF10: 3

## 2024-07-30 ASSESSMENT — PAIN DESCRIPTION - FREQUENCY: FREQUENCY: INTERMITTENT

## 2024-07-30 ASSESSMENT — PAIN DESCRIPTION - DESCRIPTORS
DESCRIPTORS: ACHING;DULL
DESCRIPTORS: ACHING
DESCRIPTORS: THROBBING
DESCRIPTORS: THROBBING
DESCRIPTORS: ACHING
DESCRIPTORS: THROBBING

## 2024-07-30 ASSESSMENT — PAIN DESCRIPTION - ORIENTATION
ORIENTATION: LEFT
ORIENTATION: RIGHT
ORIENTATION: LEFT

## 2024-07-30 ASSESSMENT — PAIN DESCRIPTION - LOCATION
LOCATION: FOOT

## 2024-07-30 ASSESSMENT — PAIN - FUNCTIONAL ASSESSMENT
PAIN_FUNCTIONAL_ASSESSMENT: ACTIVITIES ARE NOT PREVENTED
PAIN_FUNCTIONAL_ASSESSMENT: PREVENTS OR INTERFERES SOME ACTIVE ACTIVITIES AND ADLS

## 2024-07-30 ASSESSMENT — PAIN DESCRIPTION - PAIN TYPE
TYPE: ACUTE PAIN
TYPE: ACUTE PAIN

## 2024-07-30 NOTE — PROGRESS NOTES
NUTRITION    Nutrition screening referral was triggered based on results obtained during nursing admission assessment for Unintentional Weight Loss.    The patient's chart was reviewed and nutrition assessment is not indicated at this time. Pt reported any wt loss is r/t fluids (on PD, hx of CHF) and has been eating okay lately, denied need for ONS. Patient will be seen per nutrition standards of care.     Chelsie Quiroz, MS, RD, LD

## 2024-07-30 NOTE — PROGRESS NOTES
Nephrology Progress Note  [facility]  [phone]   [employer]    Patient:  Te Garay   : 1965    CC:  ESRD on PD     Subjective:  BP stable   No complains  Makes good amt urine   No issues with PD     Meds:  Scheduled Meds:   insulin lispro  4 Units SubCUTAneous TID WC    aspirin  81 mg Oral Daily    carvedilol  12.5 mg Oral BID WC    clopidogrel  75 mg Oral Daily    [Held by provider] furosemide  40 mg Oral BID    isosorbide mononitrate  30 mg Oral Nightly    levETIRAcetam  500 mg Oral Daily    ranolazine  500 mg Oral BID    rosuvastatin  40 mg Oral QPM    sacubitril-valsartan  1 tablet Oral BID    sodium chloride flush  5-40 mL IntraVENous 2 times per day    insulin lispro  0-8 Units SubCUTAneous TID WC    insulin lispro  0-4 Units SubCUTAneous Nightly    insulin glargine  27 Units SubCUTAneous Daily     Continuous Infusions:   sodium chloride      dextrose      heparin (PORCINE) Infusion 13 Units/kg/hr (24 0639)     PRN Meds:.sodium chloride flush, sodium chloride, ondansetron **OR** ondansetron, acetaminophen **OR** acetaminophen, polyethylene glycol, dextrose bolus **OR** dextrose bolus, glucagon (rDNA), dextrose, heparin (porcine), heparin (porcine)    Vitals:  /73   Pulse 97   Temp 97.6 °F (36.4 °C) (Temporal)   Resp 17   Ht 1.727 m (5' 8\")   Wt 82.4 kg (181 lb 10.5 oz)   SpO2 100%   BMI 27.62 kg/m²     Physical Exam:  Gen: Resting in bed, NAD.    HEENT: MMM, OP clear.  CV: RRR, no S3.  Lungs: good respiratory effort and clear air entry   Abd: S/NT +BS , PD catheter +  Ext: No edema, no cyanosis  Skin: Warm.  No rashes appreciated.    Labs:  CBC:   Lab Results   Component Value Date/Time    WBC 5.0 2024 04:56 AM    RBC 3.87 2024 04:56 AM    HGB 11.7 2024 04:56 AM    HCT 35.6 2024 04:56 AM    MCV 91.9 2024 04:56 AM    MCH 30.1 2024 04:56 AM    MCHC 32.7 2024 04:56 AM    RDW 14.6 2024 04:56 AM

## 2024-07-30 NOTE — FLOWSHEET NOTE
07/30/24 0624   Vital Signs   BP (!) 151/76   Temp 97.6 °F (36.4 °C)   Pulse 99   Respirations 16   Weight - Scale 82.4 kg (181 lb 10.5 oz)   Weight Method Actual;Standing scale     Disconnected from CCPD per protocol.    Effluent: clear yellow    Total time: 09:18  Total UF:  1279 ml.  Total Volume:  6005 ml.  Dwell time gained:  00:06    Pt Tolerated procedure: Without difficulty    Report given to: Ellen Lilly RN    Last BM: 7-30-24

## 2024-07-30 NOTE — CONSULTS
Department of Podiatry Consult Note  Resident       Reason for Consult:  Oozing left foot wound, missed surgery for fifth metatarsal osteotomy 7/29    Requesting Physician:  Ellen Herman     CHIEF COMPLAINT:  Longstanding left foot wound     HISTORY OF PRESENT ILLNESS:    The patient is a 59 y.o. male with significant past medical history as listed below. Podiatry was consulted for Chronic diabatic left foot ulcer complicated by charcot arthropathy.  Pt sates that he noted approximately 2 tea spoons of drainage around 2 weeks ago prior to his surgery date on 7/29 with Dr. Lowe. Per patient the drainage has decreased significantly, however his nurse stated that there was some discharge on his dressing earlier today during his dressing change. Patient states that there is some pain on direct pressure, otherwise he denies any pain to his LLE. Mr Dennis states that he will be going to the cath lab with Dr. Marin tomorrow for laser and drug coated ballon plasty due to severe stenosis of his prior stent (>90%). Patient and nurse both state that Dr. Marin would like to speak with Dr. Lowe regarding the current plan and treatment course. He states that he has not had any issues walking, however he has done his best to stay off of his left foot whenever possible. Patient denies fever, chills, nausea, vomiting, shortness of breath. Affirms chest pain, however he states that it has improved some since admission.  Patient has no other pedal complaints at this time.    Past Medical History:        Diagnosis Date    Angina at rest ([facility])     Cardiomyopathy ([facility])     Carotid artery stenosis 10/25/2016    SUZANNA stented with 8 x 30 mm non-tapered Xact stent    Charcot foot due to diabetes mellitus ([facility]) 07/01/2024    CHF (congestive heart failure) ([facility]) 03/03/2015    EF 30%     CKD (chronic kidney disease) stage 2, GFR 60-89 ml/min     Coronary artery disease     sp CABG UC    Coronary artery disease involving native heart  diminished at all pedal sites b/l.    DERMATOLOGIC:   Verbal consent obtained for photos today:    Left lower extremity:  Small full thickness ulceration measuring approx. 0.2x0.2x0.2cm. Fibrotic cap with underlying granular base. Negative probe to bone. No renny wound fluctuance, crepitus. No purulence appreciated. No malodor. No acute SOI apprecaited.     Right lower extremity:  Is a closed soft tissue envelope without any acute signs or symptoms of infection.     MUSCULOSKELETAL: Muscle strength is 5/5 for all pedal groups tested. Pain on palpation of left 5th metatarsal base and left calcaneocuboid joint. Ankle joint ROM is decreased in dorsiflexion with the knee extended. Hx of charcot arthropath b/l (L>R).     IMAGING:    XR Left foot: (06/23/2024):  EXAMINATION:  THREE XRAY VIEWS OF THE LEFT FOOT   COMPARISON:  None.  HISTORY:  ORDERING SYSTEM PROVIDED HISTORY: wound on lateral side of foot  TECHNOLOGIST PROVIDED HISTORY:  Reason for exam:->wound on lateral side of foot  FINDINGS:  Chronic arthropathy at the Lisfranc joint most likely neurogenic arthropathy.  Fusion of the navicular-cuneiform joint and calcaneocuboid joint.  There is  no evidence of fracture.  There is no evidence of bony erosion to suggest  osteomyelitis.  There is soft tissue swelling at the lateral aspect of the  foot suggesting cellulitis.     IMPRESSION:  1. Soft tissue swelling at the lateral aspect of the foot suggesting  cellulitis.  2. No evidence of osteomyelitis.  3. Chronic arthropathy at the Lisfranc joint most likely neurogenic  arthropathy.        MRI Left Foot (06/29/2024):  FINDINGS:  Motion limited study.  LISFRANC JOINT: Lisfranc ligament/joint are disrupted erosive changes.  BONE MARROW: No acute fracture.  Focal osseous erosions centered at 1st  through 3rd tarsometatarsal joints, with largest component located between  the bases of the 1st and 2nd metatarsals measuring approximately 2.1 x 2.4 x  2.3 cm.  Subtle erosive

## 2024-07-30 NOTE — PROGRESS NOTES
Nondominant   LVEDP NA Normal 3-12mmHg   LVG NA Normal >/= 55%   AVG N/A      INTERVENTION(S)      Artery Location Intervention ABEL-Pre ABEL-Post Residual   SVG mid Swag Of The Month 3.0X48 3 3 None        [facility] [place] 4/9/21:   Findings:  Artery Findings/Result   LM Normal   % ostial   Cx 100% mid   % ostial   RCA Nondominant, Smaller RV marginal with mid 70% and distal 90% lesions supplying collateral to distal Cx (old finding), small but could potentially be stented if symptoms merit   S-OM Patent, 99% ostial with ABEL 1 flow   L-LAD patent   LVEDP 6   LVG 35%, inferior hk      Intervention:         PCI of SVG-OM1 with 3.5 X 23 Xience MARIIA (PD with 3.75NC throughout)     Post Cath Dx:       Severe disease as above, culprit SVG     Cardiac cath 7/7/17  ANGIOGRAPHIC FINDINGS:  1.  The left main comes from left coronary cusp; distally it gives rise to left circumflex which goes into the OM1.  Rest of the circumflex is occluded.  Left anterior descending artery is occluded.  2.  Saphenous vein graft to obtuse marginal 2 is patent.  3.  Left internal mammary artery, left anterior descending artery is patent andprovides blood to the diagonal branches.  4.  Right coronary artery comes from right coronary cusp.  The posterolateral branch has 90% stenosis present.  Rest of the vessel is patent.  5.  LVEDP was 15 mmHg.  There was no aortic valve gradient     SUMMARY:  1.  Patent left internal mammary artery to left anterior descending artery  2.  Patent saphenous vein graft to obtuse marginal 2  3.  Non-occluded saphenous vein graft to the obtuse marginal.  4.  Right coronary artery in the posterolateral branch with 90% stenosis present.  5.  Occluded left anterior descending artery in the proximal segment  6.  Occluded mid left circumflex.     RECOMMENDATIONS:  1.  Continue postoperative care.  2.  Watch for bleeding.  3.  Aggressive risk factor modification.  4.  Continue aggressive antianginal therapy.  5.  If the

## 2024-07-30 NOTE — PROGRESS NOTES
Hospitalist Progress Note      Name:  Te Garay /Age/Sex: 1965  (59 y.o. male)   MRN & CSN:  [id number] & [id number] Encounter Date/Time: 2024 2:50 PM EDT   Location:  [id number] PCP: Reid Lei MD     Attending:Phuc Mccormack MD       Hospital Day: 3    Subjective:   Chief Complaint:   Chief Complaint   Patient presents with    Chest Pain     Pt arrived via  EMS from w c/o L sided CP (7/10) w radiation to L arm, neck and shoulder that started during his home dialysis. Pt received ASA x 3 en route     Te Garay is a 59 y.o. male with a past medical history of CAD, CHF, ESRD on PD, DM1, HTN. He presented to ER with c/o left sided chest pain which radiated to left shoulder, neck and jaw. Troponin was significantly elevated from baseline. Admitted to r/o ACS.     Interval History:  Today, he is resting in bed.  He is feeling well.  No new complaints today.  No acute events overnight.  Denies CP or jaw pain since .  No SOB.  He talked with cardiology and feels good about planned PCI for tomorrow.      Independently reviewed interval ancillary notes from cardiology.     Assessment and Recommendations   Problem List  Principal Problem:    Chest pain  Resolved Problems:    * No resolved hospital problems. *     Assessment and Plan:    Chest pain / CAD  - Hx CABG (), PCI of SVG-OM (2021)  - Presented with chest pain which radiated to neck and jaw  - Troponin  469->366->426, baseline 100s  - EKG with no acute changes  - Cardiology on board  - Recent [facility] (2024) with significant disease in recently placed stent. Considering high risk PCI to SVG-OM on Wednesday with possible laser and drug coated balloon  - Remains on heparin drip, follow anti-XA  - Continue asa 81 mg, Plavix 75 mg, carvedilol 12.5 mg, rosuvastatin 40 mg, ranolazine 500 mg, Imdur 30 mg     DM1  - A1c 8.0 ()  - On insulin pump at home, he discontinued it here because pump was leaking  - BG was 406 on  presentation; reports his insulin site was disconnected  - Being covered with Lantus 27 units daily, lispro 4 units with meals and medium dose sliding scale  - one hypoglycemic event 7/29 and insulin was reduced with improvement     ESRD on peritoneal dialysis  - Management per nephrology     HTN  - BP was low on presentation 89/51 and he received fluid bolus and midodrine in ER  - Improved      Chronic sCHF  - Echo (4/2024) with EF 30-35%, grade I diastolic dysfunction  - CXR with no acute process, proBNP 9K  - Fluid management with dialysis, appears euvolemic      Seizure disorder  - Continue Keppra     Plan:   NPO at midnight for [facility] tomorrow  Continue current insulin therapies and monitor    Drugs that require monitoring for toxicity include: SQ insulin and the method of monitoring was/is daily POCT glucose achs.    Discussed care with patient and nursing.   All pertinent information and plan of care discussed with the attending physician.     Diet: ADULT DIET; Regular; 4 carb choices (60 gm/meal); Low Fat/Low Chol/High Fiber/RUBIA  Diet NPO  Code Status: Full Code  DVT Prophylaxis: Heparin  Disposition PTA: Home   Discharge Disposition: Home no need  Surrogate Decision Maker/ POA: Spouse Sachi    Review of Systems:      Pertinent positives and negatives discussed in HPI    Objective:     Intake/Output Summary (Last 24 hours) at 7/30/2024 1450  Last data filed at 7/30/2024 1135  Gross per 24 hour   Intake 920 ml   Output 900 ml   Net 20 ml      Vitals:   Vitals:    07/30/24 0624 07/30/24 0727 07/30/24 0820 07/30/24 1210   BP: (!) 151/76  125/83 108/73   Pulse: 99  97 97   Resp: 16 17 18 17   Temp: 97.6 °F (36.4 °C)  97.8 °F (36.6 °C) 97.6 °F (36.4 °C)   TempSrc:   Temporal Temporal   SpO2:   98% 100%   Weight: 82.4 kg (181 lb 10.5 oz)      Height:           Physical Exam:    Physical Examination:  Vitals:    07/30/24 1210   BP: 108/73   Pulse: 97   Resp: 17   Temp: 97.6 °F (36.4 °C)   SpO2: 100%      In: 1420  pt with diffuse abd pain and vomiting. pe as documented. plan iv iv fluids iv ,meds ua labs ct reassess

## 2024-07-30 NOTE — PLAN OF CARE
Problem: Safety - Adult  Goal: Free from fall injury  7/29/2024 2315 by Ellen Lilly, RN  Outcome: Progressing     Problem: Chronic Conditions and Co-morbidities  Goal: Patient's chronic conditions and co-morbidity symptoms are monitored and maintained or improved  7/29/2024 2315 by Ellen Lilly, RN  Outcome: Progressing

## 2024-07-30 NOTE — FLOWSHEET NOTE
07/30/24 1726   Vital Signs   BP (!) 147/85   Temp 97 °F (36.1 °C)   Pulse 82   Respirations 16   Weight - Scale 84.3 kg (185 lb 13.6 oz)   Weight Method Actual;Bed scale     Connected to Cycler per protocol.    CCPD order: (2) 2.5 % Delflex                       Total Volume: 6 L                       Therapy Time Duration (Hours): 09:00                        Exchange Volume: 2 L                        Number of Exchanges: 3                        No Final Fill    Dwell Time: 02:25  Fill Time: 00:10  Drain Time: 00:25    Effluent clear without fibrin.    Exit site care done per protocol.  Site without redness/drainage, clean/dry.     DSD/occlusive to site.     Report given to Ginger Jones RN

## 2024-07-30 NOTE — DISCHARGE INSTRUCTIONS
Podiatry Wound Care Discharge Instructions  Podiatry Wound Care Discharge Instructions  Please perform every day dressing changes to left lower extremity as follows  -Apply betadine soaked gauze to the wound on the outside of the left foot  -Next apply gauze to the top of the left foot and ankle  -Next loosely wrap the left lower extremity with Kerlix starting from just in front of the toes and ending just above the ankle joint  -Next gently wrap the left foot with 4 inch Ace bandage starting from just in front of the toes and ending just above the ankle    Patient is weightbearing as tolerated to left lower extremity  Please follow-up with Dr. David Lowe DPM for continued wound care

## 2024-07-31 DIAGNOSIS — I25.10 CORONARY ARTERY DISEASE DUE TO LIPID RICH PLAQUE: Primary | ICD-10-CM

## 2024-07-31 DIAGNOSIS — I25.83 CORONARY ARTERY DISEASE DUE TO LIPID RICH PLAQUE: Primary | ICD-10-CM

## 2024-07-31 LAB
ANION GAP SERPL CALCULATED.3IONS-SCNC: 13 MMOL/L (ref 3–16)
ANTI-XA UNFRAC HEPARIN: 0.44 IU/ML (ref 0.3–0.7)
ANTI-XA UNFRAC HEPARIN: 0.46 IU/ML (ref 0.3–0.7)
BASOPHILS # BLD: 0 K/UL (ref 0–0.2)
BASOPHILS NFR BLD: 0.9 %
BUN SERPL-MCNC: 54 MG/DL (ref 7–20)
CALCIUM SERPL-MCNC: 8.6 MG/DL (ref 8.3–10.6)
CHLORIDE SERPL-SCNC: 100 MMOL/L (ref 99–110)
CO2 SERPL-SCNC: 26 MMOL/L (ref 21–32)
CREAT SERPL-MCNC: 7.8 MG/DL (ref 0.9–1.3)
DEPRECATED RDW RBC AUTO: 15 % (ref 12.4–15.4)
ECHO BSA: 2 M2
EKG ATRIAL RATE: 87 BPM
EKG DIAGNOSIS: NORMAL
EKG P AXIS: 85 DEGREES
EKG P-R INTERVAL: 152 MS
EKG Q-T INTERVAL: 410 MS
EKG QRS DURATION: 114 MS
EKG QTC CALCULATION (BAZETT): 493 MS
EKG R AXIS: 126 DEGREES
EKG T AXIS: 88 DEGREES
EKG VENTRICULAR RATE: 87 BPM
EOSINOPHIL # BLD: 0.1 K/UL (ref 0–0.6)
EOSINOPHIL NFR BLD: 3.4 %
GFR SERPLBLD CREATININE-BSD FMLA CKD-EPI: 7 ML/MIN/{1.73_M2}
GLUCOSE BLD-MCNC: 116 MG/DL (ref 70–99)
GLUCOSE BLD-MCNC: 138 MG/DL (ref 70–99)
GLUCOSE BLD-MCNC: 138 MG/DL (ref 70–99)
GLUCOSE BLD-MCNC: 192 MG/DL (ref 70–99)
GLUCOSE BLD-MCNC: 247 MG/DL (ref 70–99)
GLUCOSE BLD-MCNC: 315 MG/DL (ref 70–99)
GLUCOSE BLD-MCNC: 327 MG/DL (ref 70–99)
GLUCOSE BLD-MCNC: 393 MG/DL (ref 70–99)
GLUCOSE BLD-MCNC: 91 MG/DL (ref 70–99)
GLUCOSE BLD-MCNC: 91 MG/DL (ref 70–99)
GLUCOSE BLD-MCNC: 92 MG/DL (ref 70–99)
GLUCOSE BLD-MCNC: 95 MG/DL (ref 70–99)
GLUCOSE SERPL-MCNC: 314 MG/DL (ref 70–99)
HCT VFR BLD AUTO: 35.6 % (ref 40.5–52.5)
HGB BLD-MCNC: 11.4 G/DL (ref 13.5–17.5)
LYMPHOCYTES # BLD: 1.3 K/UL (ref 1–5.1)
LYMPHOCYTES NFR BLD: 32.5 %
MCH RBC QN AUTO: 29.2 PG (ref 26–34)
MCHC RBC AUTO-ENTMCNC: 31.9 G/DL (ref 31–36)
MCV RBC AUTO: 91.5 FL (ref 80–100)
MONOCYTES # BLD: 0.5 K/UL (ref 0–1.3)
MONOCYTES NFR BLD: 12.4 %
NEUTROPHILS # BLD: 2.1 K/UL (ref 1.7–7.7)
NEUTROPHILS NFR BLD: 50.8 %
PERFORMED ON: ABNORMAL
PERFORMED ON: NORMAL
PLATELET # BLD AUTO: 162 K/UL (ref 135–450)
PMV BLD AUTO: 10.5 FL (ref 5–10.5)
POC ACT LR: 335 SEC
POTASSIUM SERPL-SCNC: 4.6 MMOL/L (ref 3.5–5.1)
RBC # BLD AUTO: 3.9 M/UL (ref 4.2–5.9)
SODIUM SERPL-SCNC: 139 MMOL/L (ref 136–145)
WBC # BLD AUTO: 4.1 K/UL (ref 4–11)

## 2024-07-31 PROCEDURE — 92978 ENDOLUMINL IVUS OCT C 1ST: CPT | Performed by: STUDENT IN AN ORGANIZED HEALTH CARE EDUCATION/TRAINING PROGRAM

## 2024-07-31 PROCEDURE — 99152 MOD SED SAME PHYS/QHP 5/>YRS: CPT | Performed by: STUDENT IN AN ORGANIZED HEALTH CARE EDUCATION/TRAINING PROGRAM

## 2024-07-31 PROCEDURE — C1753 CATH, INTRAVAS ULTRASOUND: HCPCS | Performed by: STUDENT IN AN ORGANIZED HEALTH CARE EDUCATION/TRAINING PROGRAM

## 2024-07-31 PROCEDURE — 85520 HEPARIN ASSAY: CPT

## 2024-07-31 PROCEDURE — C1760 CLOSURE DEV, VASC: HCPCS | Performed by: STUDENT IN AN ORGANIZED HEALTH CARE EDUCATION/TRAINING PROGRAM

## 2024-07-31 PROCEDURE — 2580000003 HC RX 258: Performed by: STUDENT IN AN ORGANIZED HEALTH CARE EDUCATION/TRAINING PROGRAM

## 2024-07-31 PROCEDURE — 99153 MOD SED SAME PHYS/QHP EA: CPT | Performed by: STUDENT IN AN ORGANIZED HEALTH CARE EDUCATION/TRAINING PROGRAM

## 2024-07-31 PROCEDURE — 2000000000 HC ICU R&B

## 2024-07-31 PROCEDURE — 92937 PRQ TRLUML REVSC CAB GRF 1: CPT | Performed by: STUDENT IN AN ORGANIZED HEALTH CARE EDUCATION/TRAINING PROGRAM

## 2024-07-31 PROCEDURE — 36415 COLL VENOUS BLD VENIPUNCTURE: CPT

## 2024-07-31 PROCEDURE — 99232 SBSQ HOSP IP/OBS MODERATE 35: CPT | Performed by: STUDENT IN AN ORGANIZED HEALTH CARE EDUCATION/TRAINING PROGRAM

## 2024-07-31 PROCEDURE — C1887 CATHETER, GUIDING: HCPCS | Performed by: STUDENT IN AN ORGANIZED HEALTH CARE EDUCATION/TRAINING PROGRAM

## 2024-07-31 PROCEDURE — 94760 N-INVAS EAR/PLS OXIMETRY 1: CPT

## 2024-07-31 PROCEDURE — 6370000000 HC RX 637 (ALT 250 FOR IP): Performed by: STUDENT IN AN ORGANIZED HEALTH CARE EDUCATION/TRAINING PROGRAM

## 2024-07-31 PROCEDURE — 80048 BASIC METABOLIC PNL TOTAL CA: CPT

## 2024-07-31 PROCEDURE — 2500000003 HC RX 250 WO HCPCS: Performed by: STUDENT IN AN ORGANIZED HEALTH CARE EDUCATION/TRAINING PROGRAM

## 2024-07-31 PROCEDURE — 4A023N7 MEASUREMENT OF CARDIAC SAMPLING AND PRESSURE, LEFT HEART, PERCUTANEOUS APPROACH: ICD-10-PCS | Performed by: STUDENT IN AN ORGANIZED HEALTH CARE EDUCATION/TRAINING PROGRAM

## 2024-07-31 PROCEDURE — 027034Z DILATION OF CORONARY ARTERY, ONE ARTERY WITH DRUG-ELUTING INTRALUMINAL DEVICE, PERCUTANEOUS APPROACH: ICD-10-PCS | Performed by: STUDENT IN AN ORGANIZED HEALTH CARE EDUCATION/TRAINING PROGRAM

## 2024-07-31 PROCEDURE — 6360000002 HC RX W HCPCS: Performed by: STUDENT IN AN ORGANIZED HEALTH CARE EDUCATION/TRAINING PROGRAM

## 2024-07-31 PROCEDURE — 6360000004 HC RX CONTRAST MEDICATION: Performed by: STUDENT IN AN ORGANIZED HEALTH CARE EDUCATION/TRAINING PROGRAM

## 2024-07-31 PROCEDURE — 6370000000 HC RX 637 (ALT 250 FOR IP): Performed by: NURSE PRACTITIONER

## 2024-07-31 PROCEDURE — 85025 COMPLETE CBC W/AUTO DIFF WBC: CPT

## 2024-07-31 PROCEDURE — C1769 GUIDE WIRE: HCPCS | Performed by: STUDENT IN AN ORGANIZED HEALTH CARE EDUCATION/TRAINING PROGRAM

## 2024-07-31 PROCEDURE — 93459 L HRT ART/GRFT ANGIO: CPT | Performed by: STUDENT IN AN ORGANIZED HEALTH CARE EDUCATION/TRAINING PROGRAM

## 2024-07-31 PROCEDURE — C1894 INTRO/SHEATH, NON-LASER: HCPCS | Performed by: STUDENT IN AN ORGANIZED HEALTH CARE EDUCATION/TRAINING PROGRAM

## 2024-07-31 PROCEDURE — 93010 ELECTROCARDIOGRAM REPORT: CPT | Performed by: INTERNAL MEDICINE

## 2024-07-31 PROCEDURE — 2709999900 HC NON-CHARGEABLE SUPPLY: Performed by: STUDENT IN AN ORGANIZED HEALTH CARE EDUCATION/TRAINING PROGRAM

## 2024-07-31 PROCEDURE — B2111ZZ FLUOROSCOPY OF MULTIPLE CORONARY ARTERIES USING LOW OSMOLAR CONTRAST: ICD-10-PCS | Performed by: STUDENT IN AN ORGANIZED HEALTH CARE EDUCATION/TRAINING PROGRAM

## 2024-07-31 PROCEDURE — 2100000000 HC CCU R&B

## 2024-07-31 PROCEDURE — 85347 COAGULATION TIME ACTIVATED: CPT

## 2024-07-31 PROCEDURE — B240ZZ3 ULTRASONOGRAPHY OF SINGLE CORONARY ARTERY, INTRAVASCULAR: ICD-10-PCS | Performed by: STUDENT IN AN ORGANIZED HEALTH CARE EDUCATION/TRAINING PROGRAM

## 2024-07-31 PROCEDURE — C1725 CATH, TRANSLUMIN NON-LASER: HCPCS | Performed by: STUDENT IN AN ORGANIZED HEALTH CARE EDUCATION/TRAINING PROGRAM

## 2024-07-31 PROCEDURE — 93005 ELECTROCARDIOGRAM TRACING: CPT | Performed by: STUDENT IN AN ORGANIZED HEALTH CARE EDUCATION/TRAINING PROGRAM

## 2024-07-31 RX ORDER — FENTANYL CITRATE 50 UG/ML
INJECTION, SOLUTION INTRAMUSCULAR; INTRAVENOUS PRN
Status: DISCONTINUED | OUTPATIENT
Start: 2024-07-31 | End: 2024-07-31 | Stop reason: HOSPADM

## 2024-07-31 RX ORDER — SODIUM CHLORIDE 9 MG/ML
INJECTION, SOLUTION INTRAVENOUS PRN
Status: DISCONTINUED | OUTPATIENT
Start: 2024-07-31 | End: 2024-08-02 | Stop reason: HOSPADM

## 2024-07-31 RX ORDER — HEPARIN SODIUM 1000 [USP'U]/ML
INJECTION, SOLUTION INTRAVENOUS; SUBCUTANEOUS PRN
Status: DISCONTINUED | OUTPATIENT
Start: 2024-07-31 | End: 2024-07-31 | Stop reason: HOSPADM

## 2024-07-31 RX ORDER — ATROPINE SULFATE 0.1 MG/ML
INJECTION INTRAVENOUS
Status: COMPLETED
Start: 2024-07-31 | End: 2024-07-31

## 2024-07-31 RX ORDER — SODIUM CHLORIDE 0.9 % (FLUSH) 0.9 %
5-40 SYRINGE (ML) INJECTION PRN
Status: DISCONTINUED | OUTPATIENT
Start: 2024-07-31 | End: 2024-08-02 | Stop reason: HOSPADM

## 2024-07-31 RX ORDER — GENTAMICIN SULFATE 1 MG/G
CREAM TOPICAL PRN
Status: DISCONTINUED | OUTPATIENT
Start: 2024-07-31 | End: 2024-08-02 | Stop reason: HOSPADM

## 2024-07-31 RX ORDER — LIDOCAINE HYDROCHLORIDE 10 MG/ML
INJECTION, SOLUTION INFILTRATION; PERINEURAL PRN
Status: DISCONTINUED | OUTPATIENT
Start: 2024-07-31 | End: 2024-07-31 | Stop reason: HOSPADM

## 2024-07-31 RX ORDER — DIPHENHYDRAMINE HYDROCHLORIDE 50 MG/ML
50 INJECTION INTRAMUSCULAR; INTRAVENOUS ONCE
Status: COMPLETED | OUTPATIENT
Start: 2024-07-31 | End: 2024-07-31

## 2024-07-31 RX ORDER — HYDRALAZINE HYDROCHLORIDE 20 MG/ML
10 INJECTION INTRAMUSCULAR; INTRAVENOUS ONCE
Status: COMPLETED | OUTPATIENT
Start: 2024-07-31 | End: 2024-07-31

## 2024-07-31 RX ORDER — MIDAZOLAM HYDROCHLORIDE 1 MG/ML
INJECTION INTRAMUSCULAR; INTRAVENOUS PRN
Status: DISCONTINUED | OUTPATIENT
Start: 2024-07-31 | End: 2024-07-31 | Stop reason: HOSPADM

## 2024-07-31 RX ORDER — SODIUM CHLORIDE 0.9 % (FLUSH) 0.9 %
5-40 SYRINGE (ML) INJECTION EVERY 12 HOURS SCHEDULED
Status: DISCONTINUED | OUTPATIENT
Start: 2024-07-31 | End: 2024-08-02 | Stop reason: HOSPADM

## 2024-07-31 RX ORDER — ACETAMINOPHEN 325 MG/1
650 TABLET ORAL EVERY 4 HOURS PRN
Status: DISCONTINUED | OUTPATIENT
Start: 2024-07-31 | End: 2024-08-02 | Stop reason: HOSPADM

## 2024-07-31 RX ADMIN — HYDRALAZINE HYDROCHLORIDE 10 MG: 20 INJECTION INTRAMUSCULAR; INTRAVENOUS at 18:29

## 2024-07-31 RX ADMIN — CLOPIDOGREL BISULFATE 75 MG: 75 TABLET ORAL at 08:00

## 2024-07-31 RX ADMIN — RANOLAZINE 500 MG: 500 TABLET, EXTENDED RELEASE ORAL at 07:59

## 2024-07-31 RX ADMIN — SACUBITRIL AND VALSARTAN 1 TABLET: 49; 51 TABLET, FILM COATED ORAL at 08:00

## 2024-07-31 RX ADMIN — ISOSORBIDE MONONITRATE 30 MG: 30 TABLET, EXTENDED RELEASE ORAL at 20:52

## 2024-07-31 RX ADMIN — LEVETIRACETAM 500 MG: 500 TABLET, FILM COATED ORAL at 08:00

## 2024-07-31 RX ADMIN — INSULIN LISPRO 4 UNITS: 100 INJECTION, SOLUTION INTRAVENOUS; SUBCUTANEOUS at 20:53

## 2024-07-31 RX ADMIN — CARVEDILOL 12.5 MG: 6.25 TABLET, FILM COATED ORAL at 18:21

## 2024-07-31 RX ADMIN — INSULIN GLARGINE 27 UNITS: 100 INJECTION, SOLUTION SUBCUTANEOUS at 07:59

## 2024-07-31 RX ADMIN — CARVEDILOL 12.5 MG: 6.25 TABLET, FILM COATED ORAL at 07:59

## 2024-07-31 RX ADMIN — DIPHENHYDRAMINE HYDROCHLORIDE 50 MG: 50 INJECTION INTRAMUSCULAR; INTRAVENOUS at 12:40

## 2024-07-31 RX ADMIN — SODIUM CHLORIDE, PRESERVATIVE FREE 10 ML: 5 INJECTION INTRAVENOUS at 20:54

## 2024-07-31 RX ADMIN — RANOLAZINE 500 MG: 500 TABLET, EXTENDED RELEASE ORAL at 20:53

## 2024-07-31 RX ADMIN — WATER 40 MG: 1 INJECTION INTRAMUSCULAR; INTRAVENOUS; SUBCUTANEOUS at 08:32

## 2024-07-31 RX ADMIN — SODIUM CHLORIDE, PRESERVATIVE FREE 10 ML: 5 INJECTION INTRAVENOUS at 08:00

## 2024-07-31 RX ADMIN — INSULIN LISPRO 2 UNITS: 100 INJECTION, SOLUTION INTRAVENOUS; SUBCUTANEOUS at 08:32

## 2024-07-31 RX ADMIN — SACUBITRIL AND VALSARTAN 1 TABLET: 49; 51 TABLET, FILM COATED ORAL at 20:52

## 2024-07-31 RX ADMIN — INSULIN LISPRO 4 UNITS: 100 INJECTION, SOLUTION INTRAVENOUS; SUBCUTANEOUS at 06:31

## 2024-07-31 RX ADMIN — ROSUVASTATIN 40 MG: 20 TABLET, FILM COATED ORAL at 18:21

## 2024-07-31 RX ADMIN — ASPIRIN 81 MG: 81 TABLET, COATED ORAL at 07:59

## 2024-07-31 RX ADMIN — WATER 40 MG: 1 INJECTION INTRAMUSCULAR; INTRAVENOUS; SUBCUTANEOUS at 12:40

## 2024-07-31 RX ADMIN — SODIUM CHLORIDE, PRESERVATIVE FREE 10 ML: 5 INJECTION INTRAVENOUS at 20:53

## 2024-07-31 ASSESSMENT — PAIN SCALES - GENERAL
PAINLEVEL_OUTOF10: 0
PAINLEVEL_OUTOF10: 0

## 2024-07-31 NOTE — PROGRESS NOTES
CLINICAL PHARMACY NOTE: MEDS TO BEDS    Total # of Prescriptions Filled: 1   The following medications were delivered to the patient:  DEXCOM G6 SENSOR (3= 1 BOX)    Additional Documentation: Ginger SANCHEZ approved to deliver medications to patient room=signed  [facility]

## 2024-07-31 NOTE — PROGRESS NOTES
[facility] Daily Progress Note      Admit Date:  7/28/2024    Chief Complaint:  Chest pain    Subjective:  Mr. Garay feels better today. No new complaints. Reviewed his clinical course, recent LHC, with him and his primary cardiologist Dr. Celestin. See A/P below for plans.     Objective:   /81   Pulse 94   Temp 97.6 °F (36.4 °C) (Temporal)   Resp 18   Ht 1.727 m (5' 8\")   Wt 83.2 kg (183 lb 8 oz)   SpO2 100%   BMI 27.90 kg/m²     Intake/Output Summary (Last 24 hours) at 7/31/2024 1218  Last data filed at 7/31/2024 0801  Gross per 24 hour   Intake 490 ml   Output 1000 ml   Net -510 ml       Physical Exam:  General:  Awake, alert, NAD  Skin:  Warm and dry  Neck:  JVD not visualized sitting upright  Chest:  CTAB, Comfortable   Cardiovascular:  RRR S1S2, no S3, No murmur  Abdomen:  Soft, ND, NT, No HSM  Extremities:  No edema    Medications:    methylPREDNISolone sodium succ (SOLU-MEDROL) 40 mg in sterile water 1 mL injection  40 mg IntraVENous Once    diphenhydrAMINE  50 mg IntraVENous Once    insulin lispro  4 Units SubCUTAneous TID     aspirin  81 mg Oral Daily    carvedilol  12.5 mg Oral BID WC    clopidogrel  75 mg Oral Daily    [Held by provider] furosemide  40 mg Oral BID    isosorbide mononitrate  30 mg Oral Nightly    levETIRAcetam  500 mg Oral Daily    ranolazine  500 mg Oral BID    rosuvastatin  40 mg Oral QPM    sacubitril-valsartan  1 tablet Oral BID    sodium chloride flush  5-40 mL IntraVENous 2 times per day    insulin lispro  0-8 Units SubCUTAneous TID     insulin lispro  0-4 Units SubCUTAneous Nightly    insulin glargine  27 Units SubCUTAneous Daily      sodium chloride      dextrose      heparin (PORCINE) Infusion 13 Units/kg/hr (07/30/24 1439)     HYDROcodone 5 mg - acetaminophen, sodium chloride flush, sodium chloride, ondansetron **OR** ondansetron, acetaminophen **OR** acetaminophen, polyethylene glycol, dextrose bolus **OR** dextrose bolus, glucagon (rDNA),

## 2024-07-31 NOTE — PROGRESS NOTES
Nephrology Progress Note  [facility]  [phone]   [employer]    Patient:  Te Garay   : 1965    CC:  ESRD on PD     Subjective:  BP stable   No complains  Makes good amt urine   No issues with PD   Diuretics on hold   Some leg swelling   [facility] today     Meds:  Scheduled Meds:   methylPREDNISolone sodium succ (SOLU-MEDROL) 40 mg in sterile water 1 mL injection  40 mg IntraVENous Once    diphenhydrAMINE  50 mg IntraVENous Once    insulin lispro  4 Units SubCUTAneous TID WC    aspirin  81 mg Oral Daily    carvedilol  12.5 mg Oral BID WC    clopidogrel  75 mg Oral Daily    [Held by provider] furosemide  40 mg Oral BID    isosorbide mononitrate  30 mg Oral Nightly    levETIRAcetam  500 mg Oral Daily    ranolazine  500 mg Oral BID    rosuvastatin  40 mg Oral QPM    sacubitril-valsartan  1 tablet Oral BID    sodium chloride flush  5-40 mL IntraVENous 2 times per day    insulin lispro  0-8 Units SubCUTAneous TID WC    insulin lispro  0-4 Units SubCUTAneous Nightly    insulin glargine  27 Units SubCUTAneous Daily     Continuous Infusions:   sodium chloride      dextrose      heparin (PORCINE) Infusion 13 Units/kg/hr (24 1439)     PRN Meds:.HYDROcodone 5 mg - acetaminophen, sodium chloride flush, sodium chloride, ondansetron **OR** ondansetron, acetaminophen **OR** acetaminophen, polyethylene glycol, dextrose bolus **OR** dextrose bolus, glucagon (rDNA), dextrose, heparin (porcine), heparin (porcine)    Vitals:  BP (!) 134/95   Pulse 94   Temp 97.6 °F (36.4 °C) (Temporal)   Resp 16   Ht 1.727 m (5' 8\")   Wt 83.2 kg (183 lb 8 oz)   SpO2 100%   BMI 27.90 kg/m²     Physical Exam:  Gen: Resting in bed, NAD.    HEENT: MMM, OP clear.  CV: RRR, no S3.  Lungs: good respiratory effort and clear air entry   Abd: S/NT +BS , PD catheter +  Ext: +edema, no cyanosis  Skin: Warm.  No rashes appreciated.    Labs:  CBC:   Lab Results   Component Value Date/Time    WBC 4.1 2024 06:31

## 2024-07-31 NOTE — PLAN OF CARE
Problem: Discharge Planning  Goal: Discharge to home or other facility with appropriate resources  Outcome: Progressing  Flowsheets (Taken 7/30/2024 0948)  Discharge to home or other facility with appropriate resources:   Identify barriers to discharge with patient and caregiver   Arrange for needed discharge resources and transportation as appropriate   Identify discharge learning needs (meds, wound care, etc)   Refer to discharge planning if patient needs post-hospital services based on physician order or complex needs related to functional status, cognitive ability or social support system     Problem: Pain  Goal: Verbalizes/displays adequate comfort level or baseline comfort level  Outcome: Progressing  Flowsheets (Taken 7/30/2024 0948)  Verbalizes/displays adequate comfort level or baseline comfort level:   Encourage patient to monitor pain and request assistance   Assess pain using appropriate pain scale   Implement non-pharmacological measures as appropriate and evaluate response   Administer analgesics based on type and severity of pain and evaluate response     Problem: Safety - Adult  Goal: Free from fall injury  7/31/2024 0925 by Ginger Jones RN  Outcome: Progressing  Flowsheets (Taken 7/30/2024 0948)  Free From Fall Injury:   Based on caregiver fall risk screen, instruct family/caregiver to ask for assistance with transferring infant if caregiver noted to have fall risk factors   Instruct family/caregiver on patient safety  7/31/2024 0139 by Ellen Lilly, RN  Outcome: Progressing     Problem: Chronic Conditions and Co-morbidities  Goal: Patient's chronic conditions and co-morbidity symptoms are monitored and maintained or improved  7/31/2024 0925 by Ginger Jones, RN  Outcome: Progressing  Flowsheets (Taken 7/30/2024 0948)  Care Plan - Patient's Chronic Conditions and Co-Morbidity Symptoms are Monitored and Maintained or Improved:   Monitor and assess patient's chronic conditions and comorbid  symptoms for stability, deterioration, or improvement   Collaborate with multidisciplinary team to address chronic and comorbid conditions and prevent exacerbation or deterioration   Update acute care plan with appropriate goals if chronic or comorbid symptoms are exacerbated and prevent overall improvement and discharge  7/31/2024 0139 by Ellen Lilly, RN  Outcome: Progressing

## 2024-07-31 NOTE — PROGRESS NOTES
BG 91. Wife states that at home they treat blood sugar under 90. Patient continues to be NPO for cardiac procedure. Yoni sent to Hospitalist to evaluate if patient can receive PRN dextrose infusion.

## 2024-07-31 NOTE — PROGRESS NOTES
Podiatric Surgery Daily Progress Note  Te Garay      Subjective :   Patient seen and examined this am at the bedside. Patient denies any acute overnight events but states the wound on his left foot is getting worse. The nurse had just been in to change the dressing consisting of a meplex. Patient would like to continue with the plan of having surgery to correct the wound on his foot but understands he must first address his heart issues. Patient will be going to the cath lab today with Dr. Marin for laser and drug coated balloon plasty due to severe stenosis of his prior stent (90%). Patient denies N/V/F/C/SOB. Patient denies calf pain, thigh pain, chest pain.     Review of Systems: A 12 point review of symptoms is unremarkable with the exception of the chief complaint. Patient specifically denies nausea, fever, vomiting, chills, shortness of breath, chest pain, abdominal pain, constipation or difficulty urinating.       Objective     BP (!) 134/95   Pulse 94   Temp 97.6 °F (36.4 °C) (Temporal)   Resp 16   Ht 1.727 m (5' 8\")   Wt 83.2 kg (183 lb 8 oz)   SpO2 96%   BMI 27.90 kg/m²      I/O:  Intake/Output Summary (Last 24 hours) at 7/31/2024 1142  Last data filed at 7/31/2024 0801  Gross per 24 hour   Intake 640 ml   Output 1000 ml   Net -360 ml              Wt Readings from Last 3 Encounters:   07/31/24 83.2 kg (183 lb 8 oz)   07/22/24 90.5 kg (199 lb 8.3 oz)   07/16/24 86.4 kg (190 lb 7.6 oz)       LABS:    Recent Labs     07/30/24  0456 07/31/24  0631   WBC 5.0 4.1   HGB 11.7* 11.4*   HCT 35.6* 35.6*    162        Recent Labs     07/29/24  0515 07/30/24  0456 07/31/24  0631      < > 139   K 4.2   < > 4.6      < > 100   CO2 23   < > 26   PHOS 4.5  --   --    BUN 66*   < > 54*   CREATININE 9.1*   < > 7.8*    < > = values in this interval not displayed.      No results for input(s): \"INR\", \"APTT\" in the last 72 hours.    Invalid input(s): \"PROT\"        LOWER EXTREMITY

## 2024-07-31 NOTE — PROGRESS NOTES
Hospitalist Progress Note      Name:  Te Garay /Age/Sex: 1965  (59 y.o. male)   MRN & CSN:  [id number] & [id number] Encounter Date/Time: 2024 2:50 PM EDT   Location:  [id number] PCP: Reid Lei MD     Attending:Phuc Mccormack MD       Hospital Day: 4    Subjective:   Chief Complaint:   Chief Complaint   Patient presents with    Chest Pain     Pt arrived via  EMS from w c/o L sided CP (7/10) w radiation to L arm, neck and shoulder that started during his home dialysis. Pt received ASA x 3 en route     Te Garay is a 59 y.o. male with a past medical history of CAD, CHF, ESRD on PD, DM1, HTN. He presented to ER with c/o left sided chest pain which radiated to left shoulder, neck and jaw. Troponin was significantly elevated from baseline. Admitted to r/o ACS.     Interval History:  Today, he is resting in bed.  Denies any CP or SOB today.  PD is still connected and  he would like to be disconnected.  He was able to get dexacom to use his own insulin pump, however he did receive Lantus this am, we will start insulin pump in the am.  He is being premedicated for dye allergy and he has PCI planned for this afternoon.       Independently reviewed interval ancillary notes from cardiology.     Assessment and Recommendations   Problem List  Principal Problem:    Chest pain  Resolved Problems:    * No resolved hospital problems. *     Assessment and Plan:    Chest pain / CAD  - Hx CABG (), PCI of SVG-OM (2021)  - Presented with chest pain which radiated to neck and jaw  - Troponin  469->366->426, baseline 100s  - EKG with no acute changes  - Cardiology on board  - Recent [facility] (2024) with significant disease in recently placed stent. Considering high risk PCI to SVG-OM on Wednesday with possible laser and drug coated balloon  - Remains on heparin drip, follow anti-XA  - Continue asa 81 mg, Plavix 75 mg, carvedilol 12.5 mg, rosuvastatin 40 mg, ranolazine 500 mg, Imdur 30 mg     DM1  -

## 2024-07-31 NOTE — PROGRESS NOTES
Patient brought over to CVU from cath lab and attached to CVU monitoring.  Report reviewed with cath lab RN.  Right groin soft and no hematoma or oozing noted, arterial sheath in place.  Occlusive dressing dry and intact.  Pedal pulses easily palpated.  ACT to be drawn 2 hours post procedure at 1700. Primary RN [place] / Becca.

## 2024-07-31 NOTE — DIALYSIS
Treatment time: 9 Hours 51 minutes  Net UF: 868 ml  Dwell Time: 10 minutes gained    Treatment completed without complications or complaints from patient. Effluent clear/yellow and lines taped to patient per protocol. Patient resting comfortably with VSS upon exiting room.      Copy of dialysis treatment record placed in chart, to be scanned into EMR and report given to Ginger Jones RN.

## 2024-07-31 NOTE — PROGRESS NOTES
Latest Reference Range & Units 07/31/24 11:31 07/31/24 11:59 07/31/24 12:31   POC Glucose 70 - 99 mg/dl 91 91 95     Patient and wife agreeable to hold off on correction of blood glucose at this time. Patient also receiving another dose of IV solu medrol at this time per MAR.

## 2024-08-01 ENCOUNTER — APPOINTMENT (OUTPATIENT)
Dept: CT IMAGING | Age: 59
DRG: 321 | End: 2024-08-01
Payer: MEDICARE

## 2024-08-01 LAB
ANION GAP SERPL CALCULATED.3IONS-SCNC: 16 MMOL/L (ref 3–16)
BUN SERPL-MCNC: 56 MG/DL (ref 7–20)
CALCIUM SERPL-MCNC: 9.6 MG/DL (ref 8.3–10.6)
CHLORIDE SERPL-SCNC: 95 MMOL/L (ref 99–110)
CO2 SERPL-SCNC: 24 MMOL/L (ref 21–32)
CREAT SERPL-MCNC: 8.4 MG/DL (ref 0.9–1.3)
DEPRECATED RDW RBC AUTO: 15.2 % (ref 12.4–15.4)
EST. AVERAGE GLUCOSE BLD GHB EST-MCNC: 182.9 MG/DL
GFR SERPLBLD CREATININE-BSD FMLA CKD-EPI: 7 ML/MIN/{1.73_M2}
GLUCOSE BLD-MCNC: 150 MG/DL (ref 70–99)
GLUCOSE BLD-MCNC: 180 MG/DL (ref 70–99)
GLUCOSE BLD-MCNC: 203 MG/DL (ref 70–99)
GLUCOSE BLD-MCNC: 333 MG/DL (ref 70–99)
GLUCOSE BLD-MCNC: 495 MG/DL (ref 70–99)
GLUCOSE SERPL-MCNC: 509 MG/DL (ref 70–99)
HBA1C MFR BLD: 8 %
HCT VFR BLD AUTO: 38.3 % (ref 40.5–52.5)
HGB BLD-MCNC: 12.2 G/DL (ref 13.5–17.5)
MCH RBC QN AUTO: 29.6 PG (ref 26–34)
MCHC RBC AUTO-ENTMCNC: 31.9 G/DL (ref 31–36)
MCV RBC AUTO: 93 FL (ref 80–100)
PERFORMED ON: ABNORMAL
PLATELET # BLD AUTO: 182 K/UL (ref 135–450)
PMV BLD AUTO: 10.8 FL (ref 5–10.5)
POC ACT LR: 145 SEC
POTASSIUM SERPL-SCNC: 5.4 MMOL/L (ref 3.5–5.1)
RBC # BLD AUTO: 4.12 M/UL (ref 4.2–5.9)
SODIUM SERPL-SCNC: 135 MMOL/L (ref 136–145)
WBC # BLD AUTO: 10.3 K/UL (ref 4–11)

## 2024-08-01 PROCEDURE — 6370000000 HC RX 637 (ALT 250 FOR IP): Performed by: INTERNAL MEDICINE

## 2024-08-01 PROCEDURE — 6370000000 HC RX 637 (ALT 250 FOR IP): Performed by: NURSE PRACTITIONER

## 2024-08-01 PROCEDURE — 83036 HEMOGLOBIN GLYCOSYLATED A1C: CPT

## 2024-08-01 PROCEDURE — 2000000000 HC ICU R&B

## 2024-08-01 PROCEDURE — 85027 COMPLETE CBC AUTOMATED: CPT

## 2024-08-01 PROCEDURE — 90945 DIALYSIS ONE EVALUATION: CPT

## 2024-08-01 PROCEDURE — 80048 BASIC METABOLIC PNL TOTAL CA: CPT

## 2024-08-01 PROCEDURE — 2580000003 HC RX 258: Performed by: STUDENT IN AN ORGANIZED HEALTH CARE EDUCATION/TRAINING PROGRAM

## 2024-08-01 PROCEDURE — 99233 SBSQ HOSP IP/OBS HIGH 50: CPT | Performed by: INTERNAL MEDICINE

## 2024-08-01 PROCEDURE — 70450 CT HEAD/BRAIN W/O DYE: CPT

## 2024-08-01 PROCEDURE — 36415 COLL VENOUS BLD VENIPUNCTURE: CPT

## 2024-08-01 PROCEDURE — 6370000000 HC RX 637 (ALT 250 FOR IP): Performed by: STUDENT IN AN ORGANIZED HEALTH CARE EDUCATION/TRAINING PROGRAM

## 2024-08-01 RX ORDER — HYDROCODONE BITARTRATE AND ACETAMINOPHEN 5; 325 MG/1; MG/1
1 TABLET ORAL EVERY 8 HOURS PRN
Status: DISCONTINUED | OUTPATIENT
Start: 2024-08-01 | End: 2024-08-02 | Stop reason: HOSPADM

## 2024-08-01 RX ORDER — GLUCAGON 1 MG/ML
1 KIT INJECTION PRN
Status: DISCONTINUED | OUTPATIENT
Start: 2024-08-01 | End: 2024-08-01 | Stop reason: SDUPTHER

## 2024-08-01 RX ORDER — DEXTROSE MONOHYDRATE 100 MG/ML
INJECTION, SOLUTION INTRAVENOUS CONTINUOUS PRN
Status: DISCONTINUED | OUTPATIENT
Start: 2024-08-01 | End: 2024-08-01

## 2024-08-01 RX ORDER — FUROSEMIDE 40 MG/1
40 TABLET ORAL DAILY
Status: DISCONTINUED | OUTPATIENT
Start: 2024-08-01 | End: 2024-08-02 | Stop reason: HOSPADM

## 2024-08-01 RX ADMIN — LEVETIRACETAM 500 MG: 500 TABLET, FILM COATED ORAL at 09:27

## 2024-08-01 RX ADMIN — HYDROCODONE BITARTRATE AND ACETAMINOPHEN 1 TABLET: 5; 325 TABLET ORAL at 23:04

## 2024-08-01 RX ADMIN — FUROSEMIDE 40 MG: 40 TABLET ORAL at 11:42

## 2024-08-01 RX ADMIN — INSULIN LISPRO 4 UNITS: 100 INJECTION, SOLUTION INTRAVENOUS; SUBCUTANEOUS at 09:27

## 2024-08-01 RX ADMIN — ROSUVASTATIN 40 MG: 20 TABLET, FILM COATED ORAL at 17:59

## 2024-08-01 RX ADMIN — CARVEDILOL 12.5 MG: 6.25 TABLET, FILM COATED ORAL at 17:59

## 2024-08-01 RX ADMIN — CARVEDILOL 12.5 MG: 6.25 TABLET, FILM COATED ORAL at 09:28

## 2024-08-01 RX ADMIN — SACUBITRIL AND VALSARTAN 1 TABLET: 49; 51 TABLET, FILM COATED ORAL at 20:11

## 2024-08-01 RX ADMIN — SODIUM CHLORIDE, PRESERVATIVE FREE 10 ML: 5 INJECTION INTRAVENOUS at 20:14

## 2024-08-01 RX ADMIN — ASPIRIN 81 MG: 81 TABLET, COATED ORAL at 09:28

## 2024-08-01 RX ADMIN — ISOSORBIDE MONONITRATE 30 MG: 30 TABLET, EXTENDED RELEASE ORAL at 20:11

## 2024-08-01 RX ADMIN — SACUBITRIL AND VALSARTAN 1 TABLET: 49; 51 TABLET, FILM COATED ORAL at 10:17

## 2024-08-01 RX ADMIN — INSULIN LISPRO 4 UNITS: 100 INJECTION, SOLUTION INTRAVENOUS; SUBCUTANEOUS at 09:23

## 2024-08-01 RX ADMIN — SODIUM CHLORIDE, PRESERVATIVE FREE 10 ML: 5 INJECTION INTRAVENOUS at 09:28

## 2024-08-01 RX ADMIN — SODIUM CHLORIDE, PRESERVATIVE FREE 10 ML: 5 INJECTION INTRAVENOUS at 09:31

## 2024-08-01 RX ADMIN — HYDROCODONE BITARTRATE AND ACETAMINOPHEN 1 TABLET: 5; 325 TABLET ORAL at 09:24

## 2024-08-01 RX ADMIN — CLOPIDOGREL BISULFATE 75 MG: 75 TABLET ORAL at 09:28

## 2024-08-01 RX ADMIN — RANOLAZINE 500 MG: 500 TABLET, EXTENDED RELEASE ORAL at 20:11

## 2024-08-01 RX ADMIN — RANOLAZINE 500 MG: 500 TABLET, EXTENDED RELEASE ORAL at 09:30

## 2024-08-01 ASSESSMENT — PAIN SCALES - GENERAL
PAINLEVEL_OUTOF10: 0
PAINLEVEL_OUTOF10: 5
PAINLEVEL_OUTOF10: 0
PAINLEVEL_OUTOF10: 8

## 2024-08-01 ASSESSMENT — PAIN DESCRIPTION - LOCATION
LOCATION: FOOT
LOCATION: HEAD;FOOT

## 2024-08-01 ASSESSMENT — PAIN DESCRIPTION - ORIENTATION: ORIENTATION: LEFT

## 2024-08-01 NOTE — PROGRESS NOTES
Hospitalist Progress Note      Name:  Te Garay /Age/Sex: 1965  (59 y.o. male)   MRN & CSN:  [id number] & [id number] Encounter Date/Time: 2024 2:50 PM EDT   Location:  [id number]- PCP: Reid Lei MD     Attending:Phuc Mccormack MD       Hospital Day: 5    Subjective:   Chief Complaint:   Chief Complaint   Patient presents with    Chest Pain     Pt arrived via [facility] from w c/o L sided CP (7/10) w radiation to L arm, neck and shoulder that started during his home dialysis. Pt received ASA x 3 en route     Te Garay is a 59 y.o. male with a past medical history of CAD, CHF, ESRD on PD, DM1, HTN. He presented to ER with c/o left sided chest pain which radiated to left shoulder, neck and jaw. Troponin was significantly elevated from baseline. Admitted to r/o ACS.     Interval History:  Today, he is resting in bed.  Reports pain in left foot.  Denies CP or SOB.  Feels well today post procedure.  Some mild tachycardia on telemetry. BG have been elevated, planning to resume home insulin pump today.       Independently reviewed interval ancillary notes from cardiology.     Assessment and Recommendations   Problem List  Principal Problem:    Chest pain  Resolved Problems:    * No resolved hospital problems. *     Assessment and Plan:    Chest pain / CAD  - Hx CABG (), PCI of SVG-OM (2021)  - Presented with chest pain which radiated to neck and jaw  - Troponin  469->366->426, baseline 100s  - EKG with no acute changes  - Cardiology on board  - Recent [facility] (2024) with significant disease in recently placed stent. S/p high risk PCI to SVG-OM  Dr. Marin   ~ Right groin soft, CDI, no hematoma or oozing  - Remains on heparin drip, follow anti-XA  - Continue asa 81 mg, Plavix 75 mg, carvedilol 12.5 mg, rosuvastatin 40 mg, ranolazine 500 mg, Imdur 30 mg     DM1  - A1c 8.0 ()  - On insulin pump at home, he discontinued it here because pump was leaking  - -500 this am   - Last  AM    BILIRUBINUR Negative 04/19/2024 10:30 AM    BILIRUBINUR NEGATIVE 05/25/2010 07:19 PM    BLOODU MODERATE 04/19/2024 10:30 AM    GLUCOSEU 500 04/19/2024 10:30 AM    GLUCOSEU >=1000 05/25/2010 07:19 PM    KETUA TRACE 04/19/2024 10:30 AM    AMORPHOUS 2+ 12/07/2021 05:08 AM     Urine Cultures:   Lab Results   Component Value Date/Time    LABURIN No growth at 18-36 hours 05/18/2016 09:10 AM     Blood Cultures:   Lab Results   Component Value Date/Time    BC No Growth after 4 days of incubation. 04/17/2024 10:58 PM     Lab Results   Component Value Date/Time    BLOODCULT2 No Growth after 4 days of incubation. 04/17/2024 11:18 PM     Organism:   Lab Results   Component Value Date/Time    ORG Prevotella bivia 07/01/2024 04:30 AM     Personally reviewed labs, diagnostic, device, and imaging results reviewed as a part of this visit    Electronically signed by JAYLEN Pedro CNP on 8/1/2024 at 8:38 AM

## 2024-08-01 NOTE — DIALYSIS
Treatment initiated without complications or complaints from patient. Patient resting comfortably with VSS upon dialysis RN exit from room. Larry Vizcaino RN notified of start of treatment and hotline number located on top of machine for any questions about troubleshooting during after hours.    indwelling

## 2024-08-01 NOTE — PROGRESS NOTES
Nephrology Progress Note  [facility]  [phone]   [employer] Plan[employer] Anten[employer]    Patient:  Te Garay   : 1965    CC:  ESRD on PD     Subjective:  BP stable   No complains  Makes good amt urine   No issues with PD   Left foot under dressing , had some left foot pain .   Got pain med for HA this am .   Had chest soreness ON.   Possible d/c today      Meds:  Scheduled Meds:   Insulin Pump - Bolus Dose   SubCUTAneous 4x Daily AC & HS    Insulin Pump - Basal Dose   SubCUTAneous Daily    sodium chloride flush  5-40 mL IntraVENous 2 times per day    aspirin  81 mg Oral Daily    carvedilol  12.5 mg Oral BID WC    clopidogrel  75 mg Oral Daily    [Held by provider] furosemide  40 mg Oral BID    isosorbide mononitrate  30 mg Oral Nightly    levETIRAcetam  500 mg Oral Daily    ranolazine  500 mg Oral BID    rosuvastatin  40 mg Oral QPM    sacubitril-valsartan  1 tablet Oral BID    sodium chloride flush  5-40 mL IntraVENous 2 times per day     Continuous Infusions:   sodium chloride      sodium chloride      dextrose       PRN Meds:.sodium chloride flush, sodium chloride, acetaminophen, gentamicin, sodium chloride flush, sodium chloride, ondansetron **OR** ondansetron, [DISCONTINUED] acetaminophen **OR** acetaminophen, polyethylene glycol, dextrose bolus **OR** dextrose bolus, glucagon (rDNA), dextrose    Vitals:  /70   Pulse (!) 101   Temp 97.4 °F (36.3 °C)   Resp 13   Ht 1.727 m (5' 8\")   Wt 83.2 kg (183 lb 8 oz)   SpO2 (!) 65%   BMI 27.90 kg/m²     Physical Exam:  Gen: Resting in bed, NAD.    HEENT: MMM, OP clear.  CV: RRR, no S3.  Lungs: good respiratory effort and clear air entry   Abd: S/NT +BS , PD catheter +  Ext: +edema, no cyanosis, left foot in dressing .   Skin: Warm.  No rashes appreciated.    Labs:  CBC:   Lab Results   Component Value Date/Time    WBC 10.3 2024 04:36 AM    RBC 4.12 2024 04:36 AM    HGB 12.2 2024 04:36 AM    HCT 38.3 2024 04:36 AM

## 2024-08-01 NOTE — PROGRESS NOTES
[facility] Daily Progress Note      Admit Date:  7/28/2024      Cardiology consult: chest pain    Subjective:  Mr. Garay  is a patient well known to me. S/p DCB yesterday with my partner Dr Marin. He is feeling a little better, had a headache earlier but doing better after medication.       History of present illness:   Te Garay has a PMH of CAD, s/p CABG '01, s/p PCI '21, ESRD on PD, Ischemic CMP, dHF, Htn, Hld, carotid artery stenosis s/p rt carotid artery stenting, DM, CVA.  He is currently on the transplant list for kidney and pancreas and follows with .     Objective:   BP (!) 116/54   Pulse 88   Temp 97.6 °F (36.4 °C) (Temporal)   Resp 13   Ht 1.727 m (5' 8\")   Wt 83.2 kg (183 lb 8 oz)   SpO2 (!) 65%   BMI 27.90 kg/m²     Intake/Output Summary (Last 24 hours) at 8/1/2024 1528  Last data filed at 8/1/2024 1218  Gross per 24 hour   Intake --   Output 1446 ml   Net -1446 ml       Physical Exam:  General:  Awake, alert, oriented x 3, NAD  Skin:  Warm and dry  Neck:  JVD normal   Chest:  normal air entry  Cardiovascular:  RRR S1S2, no S3,   Abdomen:  Soft, ND, NT, No HSM  Extremities:  No edema    Medications:    Insulin Pump - Bolus Dose   SubCUTAneous 4x Daily AC & HS    Insulin Pump - Basal Dose   SubCUTAneous Daily    furosemide  40 mg Oral Daily    sodium chloride flush  5-40 mL IntraVENous 2 times per day    aspirin  81 mg Oral Daily    carvedilol  12.5 mg Oral BID WC    clopidogrel  75 mg Oral Daily    isosorbide mononitrate  30 mg Oral Nightly    levETIRAcetam  500 mg Oral Daily    ranolazine  500 mg Oral BID    rosuvastatin  40 mg Oral QPM    sacubitril-valsartan  1 tablet Oral BID    sodium chloride flush  5-40 mL IntraVENous 2 times per day      sodium chloride      sodium chloride      dextrose       sodium chloride flush, sodium chloride, acetaminophen, gentamicin, sodium chloride flush, sodium chloride, ondansetron **OR** ondansetron, [DISCONTINUED] acetaminophen **OR**

## 2024-08-01 NOTE — PLAN OF CARE
Problem: Genitourinary - Adult  Goal: Absence of urinary retention  Outcome: Progressing   PD in progress  Voiding  Lasix dose given  Diuresing  Bun/creat 56/8.4  Problem: Cardiovascular - Adult  Goal: Maintains optimal cardiac output and hemodynamic stability  Outcome: Progressing

## 2024-08-01 NOTE — PROGRESS NOTES
ACT drawn and resulted 145.  Patient instructed on removal procedure.  Arterial sheath site without hematoma or oozing. Arterial sheath removed per policy without difficulty. Integrity of sheath inspected upon removal and no abnormalities noted.  Manual pressure held X 20 minutes.  Dry, sterile tegaderm applied.  Pressure dressing applied.  Patient tolerated well.  Vital signs, groin checks, and pedal pulses will be completed per protocol (every 15 minutes X 4, every 30 minutes X 2, and every hour X 2 per protocol).    Sheath removed by  Larry.

## 2024-08-01 NOTE — DISCHARGE SUMMARY
take  how to take this  when to take this            CONTINUE taking these medications      aspirin 81 MG EC tablet  Take 1 tablet by mouth daily     carvedilol 12.5 MG tablet  Commonly known as: COREG  Take 1 tablet by mouth 2 times daily (with meals)     clopidogrel 75 MG tablet  Commonly known as: PLAVIX  TAKE 1 TABLET BY MOUTH EVERY DAY.     furosemide 40 MG tablet  Commonly known as: LASIX  Take 1 tablet by mouth in the morning and 1 tablet in the evening.     levETIRAcetam 500 MG tablet  Commonly known as: KEPPRA  Take 1 tablet by mouth daily     rosuvastatin 40 MG tablet  Commonly known as: CRESTOR  Take 1 tablet by mouth every evening     sacubitril-valsartan 49-51 MG per tablet  Commonly known as: ENTRESTO  Take 1 tablet by mouth 2 times daily            STOP taking these medications      hydrALAZINE 50 MG tablet  Commonly known as: APRESOLINE            ASK your doctor about these medications      Dexcom G6 Sensor Misc  1 each by Does not apply route once for 1 dose  Ask about: Should I take this medication?               Where to Get Your Medications        These medications were sent to [facility] [place], [place] - [facility] [place] [facility] [place] [address] - P [phone] - F [phone]  [address] [facility] [address]      Phone: [phone]   Dexcom G6 Sensor Misc       You can get these medications from any pharmacy    Bring a paper prescription for each of these medications  HYDROcodone-acetaminophen 5-325 MG per tablet       Spent 35 minutes in discharge process.    Signed:  JAYLEN Pedro CNP     8/1/2024 1:44 PM

## 2024-08-01 NOTE — PROGRESS NOTES
Clinical Pharmacy Note    HF Core Measures Assessment    Latest EF: 35-40%    Entresto/ACE/ARB (EF<40%): entresto 49-51 mg tablets - 1 tablet by mouth twice daily  Evidence based BB (bisoprolol, metoprolol XL, carvedilol) (EF<40%): carvedilol 12.5 mg BID  Aldosterone Antagonist (EF<40%): No - ESRD on HD  SGLT2 (HFpEF/HFmrEF/HFrEF): No - renal dysfunction    (May consider the use of hydralazine + nitrate in patients intolerant to ACEI/ARB)    Kellie Simms, PharmD, BCPS  Clinical Pharmacist  [id number]

## 2024-08-01 NOTE — PROGRESS NOTES
Podiatric Surgery Daily Progress Note  Te Garay      Subjective :   Patient seen and examined this am at the bedside. Patient denies any acute overnight events. Patient relates that his foot is hurting him today. He is S/P laser and drug coated balloon plasty due to severe stenosis of his prior stent (90%) with Dr. Marin. Patient denies N/V/F/C/SOB. Patient denies calf pain, thigh pain, chest pain.     Review of Systems: A 12 point review of symptoms is unremarkable with the exception of the chief complaint. Patient specifically denies nausea, fever, vomiting, chills, shortness of breath, chest pain, abdominal pain, constipation or difficulty urinating.       Objective     BP (!) 116/54   Pulse 88   Temp 97.6 °F (36.4 °C) (Temporal)   Resp 13   Ht 1.727 m (5' 8\")   Wt 83.2 kg (183 lb 8 oz)   SpO2 (!) 65%   BMI 27.90 kg/m²      I/O:  Intake/Output Summary (Last 24 hours) at 8/1/2024 1413  Last data filed at 8/1/2024 1218  Gross per 24 hour   Intake 703.16 ml   Output 1456 ml   Net -752.84 ml              Wt Readings from Last 3 Encounters:   07/31/24 83.2 kg (183 lb 8 oz)   07/22/24 90.5 kg (199 lb 8.3 oz)   07/16/24 86.4 kg (190 lb 7.6 oz)       LABS:    Recent Labs     07/31/24  0631 08/01/24  0436   WBC 4.1 10.3   HGB 11.4* 12.2*   HCT 35.6* 38.3*    182        Recent Labs     08/01/24  0436   *   K 5.4*   CL 95*   CO2 24   BUN 56*   CREATININE 8.4*      No results for input(s): \"INR\", \"APTT\" in the last 72 hours.    Invalid input(s): \"PROT\"        LOWER EXTREMITY EXAMINATION    VASCULAR: DP and PT pulses are faintyl palpable 1/4 b/l. CFT is diminished to the digits of the foot b/l. Skin temperature is cool to cool from proximal to distal with no focal calor noted. No edema noted. No pain with calf compression b/l.    NEUROLOGIC: Gross and epicritic sensation is diminished b/l. Protective sensation is diminished at most pedal sites b/l.    DERMATOLOGIC:   Right lower extremity:  Is a  suggesting cellulitis.  IMPRESSION:  1. Soft tissue swelling at the lateral aspect of the foot suggesting  cellulitis.  2. No evidence of osteomyelitis.  3. Chronic arthropathy at the Lisfranc joint most likely neurogenic  arthropathy.    ASSESSMENT/PLAN  -Full thickness ulceration, Left plantar lateral foot (mcdonnell 2), stable   -Type 1 Diabetes Mellitus complicated with peripheral neuropathy  -CAD s/p CABG and PCI (2021 and 2024)  -History of charcot arthropathy b/l    -Patient examined and evaluated at the bedside   -Hypotensive otherwise VSS. No leukocytosis (WBC 10.3)  -X-rays (6/26) see details above  -MRI (6/29) see details above  -Cultures not obtained due to no purulent drainage  -LLE dressed with betadine soaked gauze, dry gauze, wrapped in kerlix and ace.   -Antibiotics: none at this time   -Weightbearing: As tolerated at this time    -Patient to undergo outpatient podiatric procedure after cardiac intervention and medical clearance has been obtained.       DISPO: Full thickness ulceration of left plantar lateral foot (mcdonnell 2). All labs and images were reviewed. Patient plans for podiatric surgical intervention in outpatient setting. Patient is stable for discharge from a podiatric standpoint pending medical and cardiac clearance. Patient understands treatment plan and will follow closely in the outpatient setting.     Discussed assessment and plan with Dr. David Lowe DPM.    Ema Albarran DPM   Podiatric Resident PGY2  [place]  Pager number [phone]  8/1/2024, 2:14 PM

## 2024-08-01 NOTE — PROGRESS NOTES
CCPD Order   Exchanges: 3   Exchange Volume: 2000 ml   Total Time: 9 hrs   Dextrose: 2.5%   Last Fill: 0 ml   Total Volume: 6000 ml     Orders verified. Supplies taken to pt's room. Report received. Cycler set up, primed and pre tested. [facility]. Pt connected to cycler. CCPD initiated without problem. Initial effluent clear.       If problems should arise please call the [phone] on top of PD cycler machine.

## 2024-08-01 NOTE — PROGRESS NOTES
Disconnected from CCPD per protocol.  Total time: 9 hr 42 min   Total UF:  1046 ml.  Total Volume:  6005 ml.  Dwell time gained:  0 hr 9 min.  Pt Tolerated procedure: no alarms   Report given at bedside

## 2024-08-02 VITALS
BODY MASS INDEX: 28.4 KG/M2 | WEIGHT: 187.39 LBS | RESPIRATION RATE: 18 BRPM | HEIGHT: 68 IN | OXYGEN SATURATION: 100 % | DIASTOLIC BLOOD PRESSURE: 64 MMHG | SYSTOLIC BLOOD PRESSURE: 128 MMHG | TEMPERATURE: 98.6 F | HEART RATE: 89 BPM

## 2024-08-02 LAB
GLUCOSE BLD-MCNC: 272 MG/DL (ref 70–99)
GLUCOSE BLD-MCNC: 85 MG/DL (ref 70–99)
GLUCOSE BLD-MCNC: 86 MG/DL (ref 70–99)
GLUCOSE BLD-MCNC: 91 MG/DL (ref 70–99)
PERFORMED ON: ABNORMAL
PERFORMED ON: NORMAL

## 2024-08-02 PROCEDURE — 2580000003 HC RX 258: Performed by: STUDENT IN AN ORGANIZED HEALTH CARE EDUCATION/TRAINING PROGRAM

## 2024-08-02 PROCEDURE — 99233 SBSQ HOSP IP/OBS HIGH 50: CPT | Performed by: NURSE PRACTITIONER

## 2024-08-02 PROCEDURE — 6370000000 HC RX 637 (ALT 250 FOR IP): Performed by: INTERNAL MEDICINE

## 2024-08-02 PROCEDURE — 6370000000 HC RX 637 (ALT 250 FOR IP): Performed by: STUDENT IN AN ORGANIZED HEALTH CARE EDUCATION/TRAINING PROGRAM

## 2024-08-02 RX ORDER — HYDROCODONE BITARTRATE AND ACETAMINOPHEN 5; 325 MG/1; MG/1
1 TABLET ORAL EVERY 8 HOURS PRN
Qty: 9 TABLET | Refills: 0 | Status: SHIPPED | OUTPATIENT
Start: 2024-08-02 | End: 2024-08-05

## 2024-08-02 RX ADMIN — RANOLAZINE 500 MG: 500 TABLET, EXTENDED RELEASE ORAL at 09:10

## 2024-08-02 RX ADMIN — SODIUM CHLORIDE, PRESERVATIVE FREE 10 ML: 5 INJECTION INTRAVENOUS at 09:12

## 2024-08-02 RX ADMIN — ASPIRIN 81 MG: 81 TABLET, COATED ORAL at 09:10

## 2024-08-02 RX ADMIN — CLOPIDOGREL BISULFATE 75 MG: 75 TABLET ORAL at 09:10

## 2024-08-02 RX ADMIN — LEVETIRACETAM 500 MG: 500 TABLET, FILM COATED ORAL at 09:10

## 2024-08-02 RX ADMIN — FUROSEMIDE 40 MG: 40 TABLET ORAL at 09:10

## 2024-08-02 RX ADMIN — SACUBITRIL AND VALSARTAN 1 TABLET: 49; 51 TABLET, FILM COATED ORAL at 09:10

## 2024-08-02 RX ADMIN — CARVEDILOL 12.5 MG: 6.25 TABLET, FILM COATED ORAL at 09:10

## 2024-08-02 ASSESSMENT — PAIN SCALES - GENERAL: PAINLEVEL_OUTOF10: 1

## 2024-08-02 NOTE — PROGRESS NOTES
Pt alert and oriented. Resting in bed. VS stable. Assessment complete. Patient denies any pain or further needs at this time. Will continue to monitor patient. Promise Lombardo

## 2024-08-02 NOTE — DISCHARGE INSTR - COC
STATUS:20030}    IV Access:  { CLOVER IV ACCESS:049329323}    Nursing Mobility/ADLs:  Walking   {CHP DME ADLs:306788715}  Transfer  {CHP DME ADLs:060584247}  Bathing  {CHP DME ADLs:397406438}  Dressing  {CHP DME ADLs:018205109}  Toileting  {CHP DME ADLs:246379746}  Feeding  {CHP DME ADLs:803522547}  Med Admin  {CHP DME ADLs:070006191}  Med Delivery   {[facility] MED Delivery:096803297}    Wound Care Documentation and Therapy:  Wound 08/17/23 Foot Left;Lateral (Active)   Wound Image   07/17/24 1519   Wound Etiology Diabetic 08/02/24 0829   Dressing Status Clean;Dry;Intact 08/02/24 0829   Wound Cleansed Not Cleansed 07/31/24 0801   Dressing/Treatment Foam 07/31/24 0801   Offloading for Diabetic Foot Ulcers Offloading boot 08/02/24 0829   Dressing Change Due 07/03/24 07/02/24 1300   Wound Length (cm) 0.6 cm 07/17/24 1519   Wound Width (cm) 0.6 cm 07/17/24 1519   Wound Depth (cm) 0.1 cm 07/17/24 1519   Wound Surface Area (cm^2) 0.36 cm^2 07/17/24 1519   Change in Wound Size % (l*w) 76 07/17/24 1519   Wound Volume (cm^3) 0.036 cm^3 07/17/24 1519   Wound Healing % 76 07/17/24 1519   Undermining Starts ___ O'Clock 12 06/26/24 0930   Undermining Ends___ O'Clock 12 06/26/24 0930   Undermining Maxium Distance (cm) 1.6 06/26/24 0930   Wound Assessment Dry 07/31/24 0430   Drainage Amount Scant (moist but unmeasurable) 07/31/24 0430   Drainage Description Yellow;Thick 07/31/24 0430   Odor None 07/31/24 0430   Katherine-wound Assessment Hyperkeratosis (callous) 07/17/24 1519   Margins Defined edges 07/17/24 1519   Number of days: 351        Elimination:  Continence:   Bowel: {YES / NO:71690}  Bladder: {YES / NO:19727}  Urinary Catheter: {Urinary Catheter:013720239}   Colostomy/Ileostomy/Ileal Conduit: {YES / NO:19727}       Date of Last BM: ***    Intake/Output Summary (Last 24 hours) at 8/2/2024 0855  Last data filed at 8/2/2024 0500  Gross per 24 hour   Intake 960 ml   Output 400 ml   Net 560 ml     I/O last 3 completed shifts:  In: 960  diagnosis listed and that he requires Home Care for greater 30 days.     Update Admission H&P: No change in H&P    PHYSICIAN SIGNATURE:  Electronically signed by JAYLEN Pedro CNP on 8/2/24 at 9:24 AM EDT

## 2024-08-02 NOTE — PROGRESS NOTES
Podiatric Surgery Daily Progress Note  Te Garay      Subjective :   Patient seen and examined this am at the bedside. Patient denies any acute overnight events. Patient relates that his foot continues to hurt but says it is feeling better then when it first opened up. Patient denies N/V/F/C/SOB. Patient denies calf pain, thigh pain, chest pain.     Review of Systems: A 12 point review of symptoms is unremarkable with the exception of the chief complaint. Patient specifically denies nausea, fever, vomiting, chills, shortness of breath, chest pain, abdominal pain, constipation or difficulty urinating.       Objective     /64   Pulse 87   Temp 98.6 °F (37 °C) (Temporal)   Resp 18   Ht 1.727 m (5' 8\")   Wt 85 kg (187 lb 6.3 oz)   SpO2 100%   BMI 28.49 kg/m²      I/O:  Intake/Output Summary (Last 24 hours) at 8/2/2024 0851  Last data filed at 8/2/2024 0500  Gross per 24 hour   Intake 960 ml   Output 400 ml   Net 560 ml              Wt Readings from Last 3 Encounters:   08/02/24 85 kg (187 lb 6.3 oz)   07/22/24 90.5 kg (199 lb 8.3 oz)   07/16/24 86.4 kg (190 lb 7.6 oz)       LABS:    Recent Labs     07/31/24  0631 08/01/24  0436   WBC 4.1 10.3   HGB 11.4* 12.2*   HCT 35.6* 38.3*    182        Recent Labs     08/01/24  0436   *   K 5.4*   CL 95*   CO2 24   BUN 56*   CREATININE 8.4*      No results for input(s): \"INR\", \"APTT\" in the last 72 hours.    Invalid input(s): \"PROT\"        LOWER EXTREMITY EXAMINATION    VASCULAR: DP and PT pulses are faintyl palpable 1/4 b/l. CFT is diminished to the digits of the foot b/l. Skin temperature is cool to cool from proximal to distal with no focal calor noted. No edema noted. No pain with calf compression b/l.    NEUROLOGIC: Gross and epicritic sensation is diminished b/l. Protective sensation is diminished at most pedal sites b/l.    DERMATOLOGIC:   Right lower extremity:  Is a closed soft tissue envelope with no acute signs or symptoms of infection.

## 2024-08-02 NOTE — PROGRESS NOTES
CO2 26 24   BUN 54* 56*   CREATININE 7.8* 8.4*     Estimated Creatinine Clearance: 10 mL/min (A) (based on SCr of 8.4 mg/dL (HH)).   CBC:   Recent Labs     07/31/24  0631 08/01/24  0436   WBC 4.1 10.3   HGB 11.4* 12.2*   HCT 35.6* 38.3*   MCV 91.5 93.0    182     Thyroid:   Lab Results   Component Value Date/Time    TSH 0.34 07/20/2024 04:25 AM     Lipids:   Lab Results   Component Value Date/Time    CHOL 162 07/16/2024 07:35 AM    HDL 48 07/16/2024 07:35 AM    TRIG 86 07/16/2024 07:35 AM     LFTS:   Lab Results   Component Value Date/Time    ALT 19 07/28/2024 01:20 AM    AST 22 07/28/2024 01:20 AM    ALKPHOS 90 07/28/2024 01:20 AM    AGRATIO 1.4 07/28/2024 01:20 AM    BILITOT 0.3 07/28/2024 01:20 AM     Cardiac Enzymes:   Lab Results   Component Value Date/Time    CKTOTAL 71 11/22/2023 05:26 AM    CKMB 1.92 05/26/2010 07:47 PM    CKMBINDEX 1.8 05/26/2010 07:47 PM    TROPONINI 0.10 10/01/2022 02:20 PM    TROPONINI 0.05 10/01/2022 03:56 AM    TROPONINI 0.06 05/20/2022 05:18 PM     Coags:   Lab Results   Component Value Date/Time    PROTIME 14.3 07/28/2024 10:38 AM    INR 1.08 07/28/2024 10:38 AM     Echo: 4/30/24  Summary   Left ventricular size is normal. Ejection fraction is 30-35%. There is   hypokinesis of the basal anteroseptal, inferoseptal, basal inferior   Grade I diastolic dysfunction.   The right ventricle is not well visualized but appears normal in size.   TAPSE= 1.8 cm   RV s' velocity= 7.4 cm/s      No significant interval change from prior     Echo: 7/16/24    Left Ventricle: Moderately reduced left ventricular systolic function with a visually estimated EF of 35 - 40%. Left ventricle is dilated. Normal wall thickness. Moderate hypokinesis of the following segments: basal anterior, basal anterolateral, basal inferolateral, mid anterior, mid anterolateral, apical anterior and apical lateral. Akinesis of the following segments: basal anteroseptal, basal inferior and basal inferoseptal. Normal       HTN- controlled   HLD  T1DM- A1C 8.0  Hx TIA     Pt stable from a cardiology standpoint. F/u in office in 1-2 weeks.     Multiple medical conditions with risk of decompensation.   All pertinent information and plan of care discussed with the physician.  All questions and concerns were addressed to the patient. Alternatives to my treatment were discussed. I have discussed the above stated plan with patient and the nurse. The patient verbalized understanding and agreed with the plan.    Thank you for allowing to us to participate in the care of Te Garay.    Total visit time > 55 minutes; > 50% spend counseling / coordinating care. I reviewed interval history, physical exam, review of data including labs, imaging, development and implementation of treatment plan and coordination of complex care. Counseled on risk factor modifications.     [facility]   Office: [phone]    NOTE:  This report was transcribed using voice recognition software.  Every effort was made to ensure accuracy; however, inadvertent computerized transcription errors may be present.

## 2024-08-02 NOTE — CARE COORDINATION
CM is active with [employer] [facility]. Message sent to NP will need orders on discharge.    CM updated Cindy with [facility]  [phone] that pt may discharge today.      Radha Browning RN, BSN  [phone]

## 2024-08-02 NOTE — PLAN OF CARE
Problem: Discharge Planning  Goal: Discharge to home or other facility with appropriate resources  8/2/2024 1201 by Ольга Ruano RN  Outcome: Adequate for Discharge  8/2/2024 1003 by Ольга Ruano RN  Outcome: Progressing     Problem: Pain  Goal: Verbalizes/displays adequate comfort level or baseline comfort level  8/2/2024 1201 by Ольга Ruano RN  Outcome: Adequate for Discharge  8/2/2024 1003 by Ольга Ruano RN  Outcome: Progressing     Problem: Safety - Adult  Goal: Free from fall injury  8/2/2024 1201 by Ольга Ruano RN  Outcome: Adequate for Discharge  8/2/2024 1003 by Ольга Ruano RN  Outcome: Progressing     Problem: Chronic Conditions and Co-morbidities  Goal: Patient's chronic conditions and co-morbidity symptoms are monitored and maintained or improved  8/2/2024 1201 by Ольга Ruano RN  Outcome: Adequate for Discharge  8/2/2024 1003 by Ольга Ruano RN  Outcome: Progressing     Problem: Genitourinary - Adult  Goal: Absence of urinary retention  8/2/2024 1201 by Ольга Ruano RN  Outcome: Adequate for Discharge  8/2/2024 1003 by Олгьа Ruano RN  Outcome: Progressing  Goal: Urinary catheter remains patent  8/2/2024 1201 by Ольга Ruano RN  Outcome: Adequate for Discharge  8/2/2024 1003 by Ольга Ruano RN  Outcome: Progressing     Problem: Cardiovascular - Adult  Goal: Maintains optimal cardiac output and hemodynamic stability  8/2/2024 1201 by Ольга Ruano RN  Outcome: Adequate for Discharge  8/2/2024 1003 by Ольга Ruano RN  Outcome: Progressing  Goal: Absence of cardiac dysrhythmias or at baseline  8/2/2024 1201 by Ольга Ruano RN  Outcome: Adequate for Discharge  8/2/2024 1003 by Ольга Ruano RN  Outcome: Progressing

## 2024-08-02 NOTE — CARE COORDINATION
08/02/24 0857   IMM Letter   IMM Letter given to Patient/Family/Significant other/Guardian/POA/by: Radha Browning RN CM   IMM Letter date given: 08/02/24   IMM Letter time given: 0851  (copy made for hard chart)

## 2024-08-02 NOTE — PROGRESS NOTES
Treatment time: 9 Hours 54 minutes  Net UF: 844 ml  Dwell Time: 23 minutes Gained    Treatment completed without complications or complaints from patient. Effluent clear and lines taped to patient per protocol. Patient resting comfortably with VSS upon exiting room.      Copy of dialysis treatment record placed in chart, to be scanned into EMR   [employer]

## 2024-08-02 NOTE — PROGRESS NOTES
Nephrology Progress Note  [facility]  [phone]   [place]    Patient:  Te Garay   : 1965    CC:  ESRD on PD     Subjective:  BP stable   No complains  Makes good amt urine   No issues with PD   Left foot under dressing , had some left foot pain .   Got pain med for HA this am .   Dced held y day d/t headache         Meds:  Scheduled Meds:   Insulin Pump - Bolus Dose   SubCUTAneous 4x Daily AC & HS    Insulin Pump - Basal Dose   SubCUTAneous Daily    furosemide  40 mg Oral Daily    sodium chloride flush  5-40 mL IntraVENous 2 times per day    aspirin  81 mg Oral Daily    carvedilol  12.5 mg Oral BID WC    clopidogrel  75 mg Oral Daily    isosorbide mononitrate  30 mg Oral Nightly    levETIRAcetam  500 mg Oral Daily    ranolazine  500 mg Oral BID    rosuvastatin  40 mg Oral QPM    sacubitril-valsartan  1 tablet Oral BID    sodium chloride flush  5-40 mL IntraVENous 2 times per day     Continuous Infusions:   sodium chloride      sodium chloride      dextrose       PRN Meds:.HYDROcodone 5 mg - acetaminophen, sodium chloride flush, sodium chloride, acetaminophen, gentamicin, sodium chloride flush, sodium chloride, ondansetron **OR** ondansetron, [DISCONTINUED] acetaminophen **OR** acetaminophen, polyethylene glycol, dextrose bolus **OR** dextrose bolus, glucagon (rDNA), dextrose    Vitals:  /64   Pulse 89   Temp 98.6 °F (37 °C) (Temporal)   Resp 18   Ht 1.727 m (5' 8\")   Wt 85 kg (187 lb 6.3 oz)   SpO2 100%   BMI 28.49 kg/m²     Physical Exam:  Gen: Resting in bed, NAD.    HEENT: MMM, OP clear.  CV: RRR, no S3.  Lungs: good respiratory effort and clear air entry   Abd: S/NT +BS , PD catheter +  Ext: +edema, no cyanosis, left foot in dressing .   Skin: Warm.  No rashes appreciated.    Labs:  CBC:   Lab Results   Component Value Date/Time    WBC 10.3 2024 04:36 AM    RBC 4.12 2024 04:36 AM    HGB 12.2 2024 04:36 AM    HCT 38.3 2024 04:36 AM  Statement Selected no

## 2024-08-02 NOTE — PLAN OF CARE
Problem: Discharge Planning  Goal: Discharge to home or other facility with appropriate resources  8/2/2024 1003 by Ольга Ruano RN  Outcome: Progressing  8/1/2024 2047 by Promise Lombardo RN  Outcome: Progressing  Flowsheets (Taken 8/1/2024 2047)  Discharge to home or other facility with appropriate resources:   Identify barriers to discharge with patient and caregiver   Arrange for needed discharge resources and transportation as appropriate   Identify discharge learning needs (meds, wound care, etc)     Problem: Pain  Goal: Verbalizes/displays adequate comfort level or baseline comfort level  8/2/2024 1003 by Ольга Ruano RN  Outcome: Progressing  8/1/2024 2047 by Promise Lombardo RN  Flowsheets (Taken 8/1/2024 2047)  Verbalizes/displays adequate comfort level or baseline comfort level:   Encourage patient to monitor pain and request assistance   Assess pain using appropriate pain scale   Administer analgesics based on type and severity of pain and evaluate response     Problem: Safety - Adult  Goal: Free from fall injury  Outcome: Progressing     Problem: Chronic Conditions and Co-morbidities  Goal: Patient's chronic conditions and co-morbidity symptoms are monitored and maintained or improved  Outcome: Progressing     Problem: Genitourinary - Adult  Goal: Absence of urinary retention  Outcome: Progressing  Goal: Urinary catheter remains patent  Outcome: Progressing     Problem: Cardiovascular - Adult  Goal: Maintains optimal cardiac output and hemodynamic stability  Outcome: Progressing  Goal: Absence of cardiac dysrhythmias or at baseline  Outcome: Progressing

## 2024-08-02 NOTE — CARE COORDINATION
Case Management -  Discharge Note      Patient Name: Te Garay                   YOB: 1965            Readmission Risk (Low < 19, Mod (19-27), High > 27): Readmission Risk Score: 34.3    Current PCP: Reid Lei MD    ([facility]) Important Message from Medicare:    Date: 08/02/2024    PT AM-PAC:   /24  OT AM-PAC:   /24    Pt had no therapy independent    Patient/patient representative has been educated on the benefits of Resumption of home care services as well as the possible risks of declining recommended services. Patient/patient representative has acknowledged the information provided and decided on the following discharge plan. Patient/ patient representative has been provided freedom of choice regarding service provider, supported by basic dialogue that supports the patient's individualized plan of care/goals.    Cindy with [facility]  [phone] aware of discharge and has epic access to obtain  orders and discharge paperwork.    [facility]  [address] [place] [address] [address],   [place] [address]  Phone: [phone]  Fax: [phone]       [facility]  [address] [place] [address]  Phone: [phone]  Fax: [phone]       Financial    Payor: MEDICARE / Plan: MEDICARE PART A AND B / Product Type: *No Product type* /     Pharmacy:  Potential assistance Purchasing Medications: No  Meds-to-Beds request: Yes      [facility] - [place], [place] - [address] - P [phone] - F [phone]  [address]  [facility] [address]  Phone: [phone] Fax: [phone]    [id number] - [place], [place] - [address] - P [phone] - F [phone]  [address]  [facility] [address]  Phone: [phone] Fax: [phone]    [id number] - [place], [place] - [address] [facility] [address] - P [phone] - F [phone]  [address] [place] [address]  Phone: [phone] Fax:  [phone]      Notes:    Additional Case Management Notes: Patient discharged 8/2/2024 to home with continued [facility]c services and medical house calls information sheet.   All discharge needs met per case management.    Radha Browning RN, BSN  [phone]

## 2024-08-02 NOTE — PLAN OF CARE
Problem: Discharge Planning  Goal: Discharge to home or other facility with appropriate resources  Outcome: Progressing  Flowsheets (Taken 8/1/2024 2047)  Discharge to home or other facility with appropriate resources:   Identify barriers to discharge with patient and caregiver   Arrange for needed discharge resources and transportation as appropriate   Identify discharge learning needs (meds, wound care, etc)     Problem: Pain  Goal: Verbalizes/displays adequate comfort level or baseline comfort level  Flowsheets (Taken 8/1/2024 2047)  Verbalizes/displays adequate comfort level or baseline comfort level:   Encourage patient to monitor pain and request assistance   Assess pain using appropriate pain scale   Administer analgesics based on type and severity of pain and evaluate response

## 2024-08-03 LAB
EKG ATRIAL RATE: 97 BPM
EKG DIAGNOSIS: NORMAL
EKG P AXIS: 59 DEGREES
EKG P-R INTERVAL: 162 MS
EKG Q-T INTERVAL: 390 MS
EKG QRS DURATION: 128 MS
EKG QTC CALCULATION (BAZETT): 495 MS
EKG R AXIS: 30 DEGREES
EKG T AXIS: 99 DEGREES
EKG VENTRICULAR RATE: 97 BPM

## 2024-08-03 PROCEDURE — 93010 ELECTROCARDIOGRAM REPORT: CPT | Performed by: INTERNAL MEDICINE

## 2024-08-06 ENCOUNTER — TELEPHONE (OUTPATIENT)
Dept: INTERNAL MEDICINE CLINIC | Age: 59
End: 2024-08-06

## 2024-08-06 NOTE — TELEPHONE ENCOUNTER
Left message on his voice mail  for patient to call for podiatry appointment for Fri 8-16-24 per order Dr. Kianna Albarran .

## 2024-08-12 NOTE — PROGRESS NOTES
FINDINGS   Artery Findings   % distal   % ostial   L-LAD patent   S-OM Patent ostial stent, 90% mid   Cx 100% prox   RCA Nondominant, small,  Nondominant   LVEDP NA Normal 3-12mmHg   LVG NA Normal >/= 55%   AVG N/A      INTERVENTION(S)      Artery Location Intervention ABEL-Pre ABEL-Post Residual   SVG mid Boelus Synergy 3.0X48 3 3 None           Cardiac cath 6/27/2024:  ANGIOGRAM/CORONARY ARTERIOGRAM:        The left main coronary artery has distal LM disease 40%.     The left anterior descending artery has an ostial flush in stent occlusion.  D1 has moderate 40% diffuse disease filled retrograde from LIMA     The left circumflex artery has a mid vessel  filled via L-L and R-L collaterals.  OM1 is ostially      The right coronary artery is dominant vessel with diffuse moderate 30-40% disease.  RPDA has luminal irregularities     SVG-OM1 has a 90% segment of in stent restenosis mid stent segment as well as at vessel touchdown     LIMA-LAD widely patent     RECOMMENDATION:    - Extensive discussion with family, recommend aggressive medical management, vessel is high risk for restenosis and elevated risk for PCI - would require laser atherectomy     Staged PCI 7/31/24       FINDINGS   Artery Findings   SVG-OM 90% Hazy in-stent lesion --> Balloon to 10% + DCB   LM Did not re-image, see recent [facility]   LIMA-LAD Did not re-image, see recent [facility]   LAD Did not re-image, see recent [facility]   Cx Did not re-image, see recent [facility]   RCA Did not re-image, see recent [facility]   LVEDP N/A mmHg Normal 3-12mmHg   LVG N/A with N/A wall motion Normal >/= 55%   AVG N/A      INTERVENTION(S)   IVUS Guided POBA + DCB to SVG-OM in stent lesion  Artery Location Intervention ABEL-Pre ABEL-Post Residual   SVG-OM Mid 4.0 NC throughout + 4.0 x 30 Agent DCB 3 3 10%      POST CATH  RECOMMENDATIONS   DAPT x 12m, if not indefinitely        Assessment:     Coronary artery disease   ~ hx CABG '01.  Hx PCI of SVG-OM1 '21 and

## 2024-08-15 ENCOUNTER — OFFICE VISIT (OUTPATIENT)
Dept: CARDIOLOGY CLINIC | Age: 59
End: 2024-08-15
Payer: MEDICARE

## 2024-08-15 VITALS
SYSTOLIC BLOOD PRESSURE: 118 MMHG | BODY MASS INDEX: 28.23 KG/M2 | HEART RATE: 82 BPM | HEIGHT: 68 IN | OXYGEN SATURATION: 99 % | WEIGHT: 186.3 LBS | DIASTOLIC BLOOD PRESSURE: 68 MMHG

## 2024-08-15 DIAGNOSIS — E78.2 MIXED HYPERLIPIDEMIA: ICD-10-CM

## 2024-08-15 DIAGNOSIS — R06.83 SNORES: ICD-10-CM

## 2024-08-15 DIAGNOSIS — I25.10 CORONARY ARTERY DISEASE INVOLVING NATIVE HEART WITHOUT ANGINA PECTORIS, UNSPECIFIED VESSEL OR LESION TYPE: Primary | ICD-10-CM

## 2024-08-15 DIAGNOSIS — I10 HTN (HYPERTENSION), BENIGN: ICD-10-CM

## 2024-08-15 DIAGNOSIS — I25.5 ISCHEMIC CARDIOMYOPATHY: ICD-10-CM

## 2024-08-15 DIAGNOSIS — I65.23 BILATERAL CAROTID ARTERY STENOSIS: ICD-10-CM

## 2024-08-15 PROCEDURE — 3017F COLORECTAL CA SCREEN DOC REV: CPT | Performed by: NURSE PRACTITIONER

## 2024-08-15 PROCEDURE — 1111F DSCHRG MED/CURRENT MED MERGE: CPT | Performed by: NURSE PRACTITIONER

## 2024-08-15 PROCEDURE — 3078F DIAST BP <80 MM HG: CPT | Performed by: NURSE PRACTITIONER

## 2024-08-15 PROCEDURE — 99214 OFFICE O/P EST MOD 30 MIN: CPT | Performed by: NURSE PRACTITIONER

## 2024-08-15 PROCEDURE — G8417 CALC BMI ABV UP PARAM F/U: HCPCS | Performed by: NURSE PRACTITIONER

## 2024-08-15 PROCEDURE — 3074F SYST BP LT 130 MM HG: CPT | Performed by: NURSE PRACTITIONER

## 2024-08-15 PROCEDURE — G8427 DOCREV CUR MEDS BY ELIG CLIN: HCPCS | Performed by: NURSE PRACTITIONER

## 2024-08-15 PROCEDURE — 1036F TOBACCO NON-USER: CPT | Performed by: NURSE PRACTITIONER

## 2024-08-15 RX ORDER — CLOPIDOGREL BISULFATE 75 MG/1
75 TABLET ORAL DAILY
Qty: 90 TABLET | Refills: 2 | Status: SHIPPED | OUTPATIENT
Start: 2024-08-15

## 2024-08-15 NOTE — PATIENT INSTRUCTIONS
No medication changes.     Complete fasting blood work in 2 months after being consistent with statin. Fast for 12 hours prior, may have black coffee or water.     Sleep medicine referral     Cardiac rehab     Repeat echo in 3 mo      Eat less of these foods  Potato chips, french fries, and other “junk” foods  Vegetables cooked in butter, cheese, or cream sauces  Fried foods  Full fat dairy products   Fam, sausage, and organ meats (like liver)  Egg yolks  Cheesecake, pastries, doughnuts, ice cream  Butter and margarine  Sweetened drinks   Tropical oils such as coconut and palm oil  Mediterranean-like diet    Eat more of these foods  Whole-grain breads and pasta, brown rice, whole-grain bagels  Fresh, frozen, baked, or steamed fruits and vegetables  Steamed, baked, or fresh foods  Fat-free dairy products   Fish, skinless poultry, lean cuts of meat (with fat trimmed away), soy products  Egg whites, egg substitutes  Dessert examples: Billy food cake, fig bars, animal crackers, juan crackers, air-popped popcorn, low-fat frozen desserts (yogurt, sherbet, ice milk)  Olive oil or canola oil (in small amounts)    The American Heart Association offers these guidelines for how much fat to include in a heart-healthy diet:  Type of fat Recommendation   Saturated fat Less than 6% of total daily calories.* If you're eating 2,000 calories a day, that's about 11 to 13 grams.   Trans fat Avoid       Exercise Recommendations for Adults  Get at least 150 minutes per week of moderate-intensity aerobic activity or 75 minutes per week of aerobic activity, or a combination of both, preferably spread throughout the week.  Add moderate intensity muscle-strengthening activity (such as resistance or weights) on at least 2 days per week.  Spend less time sitting. Even light-intensity activity can offset some of the risks of being sedentary.  Gain even more benefits by being active at least 300 minutes (5 hours) per week.  Increase amount

## 2024-08-21 ENCOUNTER — PATIENT MESSAGE (OUTPATIENT)
Dept: ENDOCRINOLOGY | Age: 59
End: 2024-08-21

## 2024-08-21 NOTE — TELEPHONE ENCOUNTER
Received refill request for isosorbide mononitrate (IMDUR) 30 MG  from [facility] pharmacy.     Last OV: 1/18/24    Next OV: 10/25/24    Last Labs:     Last Filled: 5/20/24

## 2024-08-21 NOTE — TELEPHONE ENCOUNTER
He may need adjustment in the pump. LOV 8/23 , no show last visit. No evaluation or adjustment recently.   He will need a pump download. He can schedule a visit.   In the meantime Would advise to bring the pump for download and I will have to review it and make some changes.

## 2024-08-22 RX ORDER — ISOSORBIDE MONONITRATE 30 MG/1
30 TABLET, EXTENDED RELEASE ORAL DAILY
Qty: 90 TABLET | Refills: 2 | Status: SHIPPED | OUTPATIENT
Start: 2024-08-22

## 2024-09-12 ENCOUNTER — TELEPHONE (OUTPATIENT)
Dept: CARDIOLOGY CLINIC | Age: 59
End: 2024-09-12

## 2024-09-16 ENCOUNTER — HOSPITAL ENCOUNTER (OUTPATIENT)
Age: 59
Discharge: HOME | End: 2024-09-16
Payer: MEDICARE

## 2024-09-16 LAB
ALBUMIN SERPL-MCNC: 3.5 G/DL (ref 3.4–5)
ALBUMIN/GLOB SERPL: 1.5 {RATIO} (ref 1.1–2.2)
ALP SERPL-CCNC: 107 U/L (ref 40–129)
ALT SERPL-CCNC: 6 U/L (ref 10–40)
ANION GAP SERPL CALCULATED.3IONS-SCNC: 16 MMOL/L (ref 3–16)
AST SERPL-CCNC: 11 U/L (ref 15–37)
BACTERIA URNS QL MICRO: ABNORMAL /HPF
BASOPHILS # BLD: 0 K/UL (ref 0–0.2)
BASOPHILS NFR BLD: 0.6 %
BILIRUB SERPL-MCNC: <0.2 MG/DL (ref 0–1)
BILIRUB UR QL STRIP.AUTO: NEGATIVE
BUN SERPL-MCNC: 71 MG/DL (ref 7–20)
CALCIUM SERPL-MCNC: 7.8 MG/DL (ref 8.3–10.6)
CHLORIDE SERPL-SCNC: 100 MMOL/L (ref 99–110)
CLARITY UR: CLEAR
CO2 SERPL-SCNC: 23 MMOL/L (ref 21–32)
COLOR UR: YELLOW
CREAT SERPL-MCNC: 9.1 MG/DL (ref 0.9–1.3)
DEPRECATED RDW RBC AUTO: 14.6 % (ref 12.4–15.4)
EOSINOPHIL # BLD: 0.2 K/UL (ref 0–0.6)
EOSINOPHIL NFR BLD: 4.1 %
EPI CELLS #/AREA URNS AUTO: 1 /HPF (ref 0–5)
GFR SERPLBLD CREATININE-BSD FMLA CKD-EPI: 6 ML/MIN/{1.73_M2}
GLUCOSE SERPL-MCNC: 209 MG/DL (ref 70–99)
GLUCOSE UR STRIP.AUTO-MCNC: 500 MG/DL
HCT VFR BLD AUTO: 34.4 % (ref 40.5–52.5)
HGB BLD-MCNC: 11.2 G/DL (ref 13.5–17.5)
HGB UR QL STRIP.AUTO: NEGATIVE
HYALINE CASTS #/AREA URNS AUTO: 0 /LPF (ref 0–8)
KETONES UR STRIP.AUTO-MCNC: NEGATIVE MG/DL
LEUKOCYTE ESTERASE UR QL STRIP.AUTO: ABNORMAL
LYMPHOCYTES # BLD: 1.1 K/UL (ref 1–5.1)
LYMPHOCYTES NFR BLD: 21.5 %
MCH RBC QN AUTO: 29.3 PG (ref 26–34)
MCHC RBC AUTO-ENTMCNC: 32.7 G/DL (ref 31–36)
MCV RBC AUTO: 89.7 FL (ref 80–100)
MONOCYTES # BLD: 0.6 K/UL (ref 0–1.3)
MONOCYTES NFR BLD: 12.3 %
NEUTROPHILS # BLD: 3.1 K/UL (ref 1.7–7.7)
NEUTROPHILS NFR BLD: 61.5 %
NITRITE UR QL STRIP.AUTO: NEGATIVE
PH UR STRIP.AUTO: 6 [PH] (ref 5–8)
PLATELET # BLD AUTO: 175 K/UL (ref 135–450)
PMV BLD AUTO: 11.1 FL (ref 5–10.5)
POTASSIUM SERPL-SCNC: 4 MMOL/L (ref 3.5–5.1)
PROT SERPL-MCNC: 5.9 G/DL (ref 6.4–8.2)
PROT UR STRIP.AUTO-MCNC: 100 MG/DL
RBC # BLD AUTO: 3.84 M/UL (ref 4.2–5.9)
RBC CLUMPS #/AREA URNS AUTO: 0 /HPF (ref 0–4)
SODIUM SERPL-SCNC: 139 MMOL/L (ref 136–145)
SP GR UR STRIP.AUTO: 1.01 (ref 1–1.03)
UA DIPSTICK W REFLEX MICRO PNL UR: YES
URN SPEC COLLECT METH UR: ABNORMAL
UROBILINOGEN UR STRIP-ACNC: 0.2 E.U./DL
WBC # BLD AUTO: 5 K/UL (ref 4–11)
WBC #/AREA URNS AUTO: 13 /HPF (ref 0–5)

## 2024-09-16 PROCEDURE — 85025 COMPLETE CBC W/AUTO DIFF WBC: CPT

## 2024-09-16 PROCEDURE — 80053 COMPREHEN METABOLIC PANEL: CPT

## 2024-09-16 PROCEDURE — 81001 URINALYSIS AUTO W/SCOPE: CPT

## 2024-09-26 ENCOUNTER — OFFICE VISIT (OUTPATIENT)
Dept: ORTHOPEDIC SURGERY | Age: 59
End: 2024-09-26

## 2024-09-26 VITALS — RESPIRATION RATE: 16 BRPM | WEIGHT: 186 LBS | HEIGHT: 68 IN | BODY MASS INDEX: 28.19 KG/M2

## 2024-09-26 DIAGNOSIS — Z99.2 TYPE 1 DIABETES MELLITUS WITH CHRONIC KIDNEY DISEASE ON CHRONIC DIALYSIS (HCC): ICD-10-CM

## 2024-09-26 DIAGNOSIS — E10.22 TYPE 1 DIABETES MELLITUS WITH CHRONIC KIDNEY DISEASE ON CHRONIC DIALYSIS (HCC): ICD-10-CM

## 2024-09-26 DIAGNOSIS — M22.41 CHONDROMALACIA OF RIGHT PATELLOFEMORAL JOINT: Primary | ICD-10-CM

## 2024-09-26 DIAGNOSIS — N18.6 TYPE 1 DIABETES MELLITUS WITH CHRONIC KIDNEY DISEASE ON CHRONIC DIALYSIS (HCC): ICD-10-CM

## 2024-09-26 DIAGNOSIS — M17.11 PRIMARY OSTEOARTHRITIS OF RIGHT KNEE: ICD-10-CM

## 2024-09-26 RX ORDER — BUPIVACAINE HYDROCHLORIDE 2.5 MG/ML
1 INJECTION, SOLUTION INFILTRATION; PERINEURAL ONCE
Status: COMPLETED | OUTPATIENT
Start: 2024-09-26 | End: 2024-09-26

## 2024-09-26 RX ORDER — LIDOCAINE HYDROCHLORIDE 10 MG/ML
1 INJECTION, SOLUTION INFILTRATION; PERINEURAL ONCE
Status: COMPLETED | OUTPATIENT
Start: 2024-09-26 | End: 2024-09-26

## 2024-09-26 RX ORDER — TRIAMCINOLONE ACETONIDE 40 MG/ML
40 INJECTION, SUSPENSION INTRA-ARTICULAR; INTRAMUSCULAR ONCE
Status: COMPLETED | OUTPATIENT
Start: 2024-09-26 | End: 2024-09-26

## 2024-09-26 RX ADMIN — BUPIVACAINE HYDROCHLORIDE 2.5 MG: 2.5 INJECTION, SOLUTION INFILTRATION; PERINEURAL at 16:09

## 2024-09-26 RX ADMIN — TRIAMCINOLONE ACETONIDE 40 MG: 40 INJECTION, SUSPENSION INTRA-ARTICULAR; INTRAMUSCULAR at 16:09

## 2024-09-26 RX ADMIN — LIDOCAINE HYDROCHLORIDE 1 ML: 10 INJECTION, SOLUTION INFILTRATION; PERINEURAL at 16:08

## 2024-10-01 DIAGNOSIS — I25.10 CORONARY ARTERY DISEASE DUE TO LIPID RICH PLAQUE: ICD-10-CM

## 2024-10-01 DIAGNOSIS — I25.83 CORONARY ARTERY DISEASE DUE TO LIPID RICH PLAQUE: ICD-10-CM

## 2024-10-01 NOTE — TELEPHONE ENCOUNTER
Requested Prescriptions     Pending Prescriptions Disp Refills    ranolazine (RANEXA) 500 MG extended release tablet [Pharmacy Med Name: RANOLAZINE 500MG ER TABLETS] 180 tablet 2     Sig: TAKE ONE TABLET BY MOUTH TWICE DAILY.      [facility] #[id number]      Last OV:  8/15/2024 NPKL    Next OV: 10/25/2024 KJC    Last EK2024     Last Filled: 2023 NPTS

## 2024-10-02 ENCOUNTER — OFFICE VISIT (OUTPATIENT)
Dept: ORTHOPEDIC SURGERY | Age: 59
End: 2024-10-02
Payer: MEDICARE

## 2024-10-02 VITALS — WEIGHT: 186 LBS | HEIGHT: 68 IN | RESPIRATION RATE: 14 BRPM | BODY MASS INDEX: 28.19 KG/M2

## 2024-10-02 DIAGNOSIS — M65.30 TRIGGER FINGER, ACQUIRED: Primary | ICD-10-CM

## 2024-10-02 PROCEDURE — 20550 NJX 1 TENDON SHEATH/LIGAMENT: CPT | Performed by: ORTHOPAEDIC SURGERY

## 2024-10-02 PROCEDURE — G8427 DOCREV CUR MEDS BY ELIG CLIN: HCPCS | Performed by: ORTHOPAEDIC SURGERY

## 2024-10-02 PROCEDURE — G8417 CALC BMI ABV UP PARAM F/U: HCPCS | Performed by: ORTHOPAEDIC SURGERY

## 2024-10-02 PROCEDURE — 3017F COLORECTAL CA SCREEN DOC REV: CPT | Performed by: ORTHOPAEDIC SURGERY

## 2024-10-02 PROCEDURE — G8484 FLU IMMUNIZE NO ADMIN: HCPCS | Performed by: ORTHOPAEDIC SURGERY

## 2024-10-02 PROCEDURE — 1036F TOBACCO NON-USER: CPT | Performed by: ORTHOPAEDIC SURGERY

## 2024-10-02 PROCEDURE — 99204 OFFICE O/P NEW MOD 45 MIN: CPT | Performed by: ORTHOPAEDIC SURGERY

## 2024-10-02 RX ORDER — RANOLAZINE 500 MG/1
TABLET, EXTENDED RELEASE ORAL
Qty: 180 TABLET | Refills: 2 | Status: SHIPPED | OUTPATIENT
Start: 2024-10-02

## 2024-10-02 RX ORDER — TRIAMCINOLONE ACETONIDE 40 MG/ML
20 INJECTION, SUSPENSION INTRA-ARTICULAR; INTRAMUSCULAR ONCE
Status: COMPLETED | OUTPATIENT
Start: 2024-10-02 | End: 2024-10-02

## 2024-10-02 RX ORDER — LIDOCAINE HYDROCHLORIDE 10 MG/ML
0.5 INJECTION, SOLUTION INFILTRATION; PERINEURAL ONCE
Status: COMPLETED | OUTPATIENT
Start: 2024-10-02 | End: 2024-10-02

## 2024-10-02 RX ADMIN — TRIAMCINOLONE ACETONIDE 20 MG: 40 INJECTION, SUSPENSION INTRA-ARTICULAR; INTRAMUSCULAR at 15:31

## 2024-10-02 RX ADMIN — LIDOCAINE HYDROCHLORIDE 0.5 ML: 10 INJECTION, SOLUTION INFILTRATION; PERINEURAL at 15:31

## 2024-10-02 NOTE — PROGRESS NOTES
The nature of this medical problem is fully discussed with the patient, including all treatment options, as well as the pertinent risks, complications, prognosis and post treatment care. All questions are answered. The right  hand is prepared with Betadine and alcohol and the flexor tendon sheath of the right little finger is injected with 1/2 milliliter of 1% lidocaine and 20 mg.of triamcinalone, with good filling.  The patient is advised regarding the expected response and possible reactions from the injection.  Acetaminophen or ibuprofen are prescribed for any significant post injection pain.  The patient is asked to call me if full, painless function has not returned within 6 weeks.  The possibility of recurrence of the problem is discussed.

## 2024-10-02 NOTE — PLAN OF CARE
[facility] [address], [place] [address] office: [phone] fax: [phone]     Physical Therapy Initial Evaluation Certification      Dear Brandon Herman MD,    We had the pleasure of evaluating the following patient for physical therapy services at [facility].  A summary of our findings can be found in the initial assessment below.  This includes our plan of care.  If you have any questions or concerns regarding these findings, please do not hesitate to contact me at the office phone number listed above.  Thank you for the referral.     Physician Signature:_______________________________Date:__________________  By signing above (or electronic signature), therapist’s plan is approved by physician       Physical Therapy: TREATMENT/PROGRESS NOTE   Patient: Te Garay (59 y.o. male)   Examination Date: 10/03/2024   :  1965 MRN: [id number]   Visit #: 1   Insurance Allowable Auth Needed   BMN []Yes    [x]No    Insurance: Payor: MEDICARE / Plan: MEDICARE PART A AND B / Product Type: *No Product type* /   Insurance ID: [id number] - (Medicare)  Secondary Insurance (if applicable): BCBS   Treatment Diagnosis:     ICD-10-CM    1. Chronic pain of right knee  M25.561     G89.29       2. Decreased strength involving knee joint  R29.898       3. Impaired functional mobility, balance, gait, and endurance  Z74.09          Medical Diagnosis:  Chondromalacia of right patellofemoral joint [M22.41]   Referring Physician: Brandon Herman MD  PCP: Reid Lei MD     Plan of care signed (Y/N):     Date of Patient follow up with Physician:      Plan of Care Report: EVAL today  POC update due: (10 visits /OR AUTH LIMITS, whichever is less)  2024                                             Medical History:  Comorbidities:  Diabetes (Type I or II)  Hypertension  Stroke/TIA  Osteoarthritis  Anxiety  Depression  Relevant

## 2024-10-03 ENCOUNTER — HOSPITAL ENCOUNTER (OUTPATIENT)
Dept: PHYSICAL THERAPY | Age: 59
Setting detail: THERAPIES SERIES
Discharge: HOME | End: 2024-10-03
Payer: MEDICARE

## 2024-10-03 DIAGNOSIS — Z74.09 IMPAIRED FUNCTIONAL MOBILITY, BALANCE, GAIT, AND ENDURANCE: ICD-10-CM

## 2024-10-03 DIAGNOSIS — G89.29 CHRONIC PAIN OF RIGHT KNEE: Primary | ICD-10-CM

## 2024-10-03 DIAGNOSIS — M25.561 CHRONIC PAIN OF RIGHT KNEE: Primary | ICD-10-CM

## 2024-10-03 DIAGNOSIS — R29.898 DECREASED STRENGTH INVOLVING KNEE JOINT: ICD-10-CM

## 2024-10-03 PROCEDURE — 97162 PT EVAL MOD COMPLEX 30 MIN: CPT

## 2024-10-03 PROCEDURE — 97110 THERAPEUTIC EXERCISES: CPT

## 2024-10-09 ENCOUNTER — HOSPITAL ENCOUNTER (OUTPATIENT)
Dept: PHYSICAL THERAPY | Age: 59
Setting detail: THERAPIES SERIES
Discharge: HOME | End: 2024-10-09
Payer: MEDICARE

## 2024-10-09 PROCEDURE — 97110 THERAPEUTIC EXERCISES: CPT

## 2024-10-09 NOTE — FLOWSHEET NOTE
(13134)  Sets/time     Step up and over 6\" 2 10 10/9   LSD   NPV                           Modalities:    No modalities applied this session    Education/Home Exercise Program: Patient HEP program created electronically.  Refer to [employer] access code: [email or web address]  Access Code: [email or web address]  URL: [email or web address]  Date: 10/03/2024  Prepared by: Kera Tariq    Exercises  - Seated Hamstring Stretch  - 1 x daily - 7 x weekly - 1 sets - 3 reps - 20 sec hold  - Gastroc Stretch with Foot at Wall  - 1 x daily - 7 x weekly - 1 sets - 3 reps - 20 sec hold  - Seated Knee Extension with Resistance  - 1 x daily - 7 x weekly - 3 sets - 10 reps  - Standing Hamstring Curl with Resistance  - 1 x daily - 7 x weekly - 3 sets - 10 reps  - Supine Bridge with Resistance Band  - 1 x daily - 7 x weekly - 2-3 sets - 10 reps  - Standing Terminal Knee Extension with Resistance  - 1 x daily - 7 x weekly - 2 sets - 10 reps - 3 sec hold    ASSESSMENT     Today's Assessment: Pt tolerated FFU session generally well with focus on R knee stretching and muscle engagement. Pt demonstrates increase weakness of the R knee through reported fatigue with exercises implemented. He does also demonstrate weakness of the R knee as seen through mild, brief buckling of the R knee with step up and over. Continue PT with focus on R knee stretching and strengthening.    Medical Necessity Documentation:  I certify that this patient meets the below criteria necessary for medical necessity for care and/or justification of therapy services:  The patient has functional impairments and/or activity limitations and would benefit from continued outpatient therapy services to address the deficits outlined in the patients goals  The patient has a musculoskeletal condition(s) with a corresponding ICD-10 code that is of complexity and severity that require skilled therapeutic intervention. This has a direct and significant impact on the need for therapy and

## 2024-10-11 ENCOUNTER — APPOINTMENT (OUTPATIENT)
Dept: GENERAL RADIOLOGY | Age: 59
DRG: 919 | End: 2024-10-11
Payer: MEDICARE

## 2024-10-11 ENCOUNTER — APPOINTMENT (OUTPATIENT)
Dept: CT IMAGING | Age: 59
DRG: 919 | End: 2024-10-11
Payer: MEDICARE

## 2024-10-11 ENCOUNTER — HOSPITAL ENCOUNTER (INPATIENT)
Age: 59
LOS: 3 days | Discharge: HOME | DRG: 919 | End: 2024-10-14
Attending: STUDENT IN AN ORGANIZED HEALTH CARE EDUCATION/TRAINING PROGRAM | Admitting: STUDENT IN AN ORGANIZED HEALTH CARE EDUCATION/TRAINING PROGRAM
Payer: MEDICARE

## 2024-10-11 ENCOUNTER — APPOINTMENT (OUTPATIENT)
Dept: ULTRASOUND IMAGING | Age: 59
DRG: 919 | End: 2024-10-11
Payer: MEDICARE

## 2024-10-11 DIAGNOSIS — A41.9 SEPTICEMIA (HCC): ICD-10-CM

## 2024-10-11 DIAGNOSIS — I25.83 CORONARY ARTERY DISEASE DUE TO LIPID RICH PLAQUE: ICD-10-CM

## 2024-10-11 DIAGNOSIS — I25.10 CORONARY ARTERY DISEASE DUE TO LIPID RICH PLAQUE: ICD-10-CM

## 2024-10-11 DIAGNOSIS — T85.71XA PERITONITIS ASSOCIATED WITH PERITONEAL DIALYSIS, INITIAL ENCOUNTER (HCC): Primary | ICD-10-CM

## 2024-10-11 PROBLEM — G40.909 SEIZURE DISORDER (HCC): Status: ACTIVE | Noted: 2023-11-20

## 2024-10-11 PROBLEM — E66.3 OVERWEIGHT (BMI 25.0-29.9): Status: ACTIVE | Noted: 2024-10-11

## 2024-10-11 PROBLEM — E11.21 DIABETIC NEPHROPATHY ASSOCIATED WITH TYPE 2 DIABETES MELLITUS (HCC): Status: ACTIVE | Noted: 2024-10-11

## 2024-10-11 PROBLEM — Z95.1 S/P CABG (CORONARY ARTERY BYPASS GRAFT): Status: ACTIVE | Noted: 2024-10-11

## 2024-10-11 PROBLEM — K80.20 CALCULUS OF GALLBLADDER WITHOUT CHOLECYSTITIS WITHOUT OBSTRUCTION: Status: ACTIVE | Noted: 2024-10-11

## 2024-10-11 PROBLEM — R19.7 DIARRHEA: Status: ACTIVE | Noted: 2024-10-11

## 2024-10-11 LAB
ALBUMIN SERPL-MCNC: 3.1 G/DL (ref 3.4–5)
ALP SERPL-CCNC: 83 U/L (ref 40–129)
ALT SERPL-CCNC: 8 U/L (ref 10–40)
ANION GAP SERPL CALCULATED.3IONS-SCNC: 14 MMOL/L (ref 3–16)
APPEARANCE FLUID: NORMAL
AST SERPL-CCNC: 10 U/L (ref 15–37)
BASOPHILS # BLD: 0 K/UL (ref 0–0.2)
BASOPHILS NFR BLD: 0.2 %
BDY FLUID QUALITY: NORMAL
BILIRUB DIRECT SERPL-MCNC: <0.1 MG/DL (ref 0–0.3)
BILIRUB INDIRECT SERPL-MCNC: ABNORMAL MG/DL (ref 0–1)
BILIRUB SERPL-MCNC: <0.2 MG/DL (ref 0–1)
BUN SERPL-MCNC: 47 MG/DL (ref 7–20)
CALCIUM SERPL-MCNC: 8.2 MG/DL (ref 8.3–10.6)
CELL COUNT FLUID TYPE: NORMAL
CHLORIDE SERPL-SCNC: 94 MMOL/L (ref 99–110)
CO2 SERPL-SCNC: 25 MMOL/L (ref 21–32)
COLOR FLUID: YELLOW
CREAT SERPL-MCNC: 7.4 MG/DL (ref 0.9–1.3)
DEPRECATED RDW RBC AUTO: 14.4 % (ref 12.4–15.4)
EKG ATRIAL RATE: 104 BPM
EKG DIAGNOSIS: NORMAL
EKG P AXIS: 77 DEGREES
EKG P-R INTERVAL: 122 MS
EKG Q-T INTERVAL: 358 MS
EKG QRS DURATION: 92 MS
EKG QTC CALCULATION (BAZETT): 470 MS
EKG R AXIS: 97 DEGREES
EKG T AXIS: 13 DEGREES
EKG VENTRICULAR RATE: 104 BPM
EOSINOPHIL # BLD: 0 K/UL (ref 0–0.6)
EOSINOPHIL NFR BLD: 0.6 %
FLUAV RNA RESP QL NAA+PROBE: NOT DETECTED
FLUBV RNA RESP QL NAA+PROBE: NOT DETECTED
GFR SERPLBLD CREATININE-BSD FMLA CKD-EPI: 8 ML/MIN/{1.73_M2}
GLUCOSE BLD-MCNC: 214 MG/DL (ref 70–99)
GLUCOSE BLD-MCNC: 226 MG/DL (ref 70–99)
GLUCOSE BLD-MCNC: 235 MG/DL (ref 70–99)
GLUCOSE SERPL-MCNC: 188 MG/DL (ref 70–99)
HCT VFR BLD AUTO: 35.2 % (ref 40.5–52.5)
HGB BLD-MCNC: 11.2 G/DL (ref 13.5–17.5)
LACTATE BLDV-SCNC: 0.9 MMOL/L (ref 0.4–1.9)
LIPASE SERPL-CCNC: 22 U/L (ref 13–60)
LYMPHOCYTES # BLD: 0.8 K/UL (ref 1–5.1)
LYMPHOCYTES NFR BLD: 15.6 %
LYMPHOCYTES NFR FLD: 9 %
MCH RBC QN AUTO: 28.1 PG (ref 26–34)
MCHC RBC AUTO-ENTMCNC: 31.8 G/DL (ref 31–36)
MCV RBC AUTO: 88.3 FL (ref 80–100)
MONOCYTES # BLD: 0.6 K/UL (ref 0–1.3)
MONOCYTES NFR BLD: 12.5 %
MONONUCLEAR UNIDENTIFIED CELLS FLUID: 1 %
NEUTROPHIL, FLUID: 90 %
NEUTROPHILS # BLD: 3.7 K/UL (ref 1.7–7.7)
NEUTROPHILS NFR BLD: 71.1 %
NUC CELL # FLD: NORMAL /CUMM
PATH CONSULT FLUID: NORMAL
PATH REV: YES
PERFORMED ON: ABNORMAL
PLATELET # BLD AUTO: 196 K/UL (ref 135–450)
PMV BLD AUTO: 10.1 FL (ref 5–10.5)
POTASSIUM SERPL-SCNC: 3.6 MMOL/L (ref 3.5–5.1)
PROCALCITONIN SERPL IA-MCNC: 0.8 NG/ML (ref 0–0.15)
PROT SERPL-MCNC: 6 G/DL (ref 6.4–8.2)
RBC # BLD AUTO: 3.99 M/UL (ref 4.2–5.9)
RBC FLUID: 7900 /CUMM
SARS-COV-2 RNA RESP QL NAA+PROBE: NOT DETECTED
SODIUM SERPL-SCNC: 133 MMOL/L (ref 136–145)
SPECIMEN VOL FLD: 6 ML
TOTAL CELLS COUNTED FLD: 100
WBC # BLD AUTO: 5.1 K/UL (ref 4–11)

## 2024-10-11 PROCEDURE — 6360000002 HC RX W HCPCS: Performed by: PHYSICIAN ASSISTANT

## 2024-10-11 PROCEDURE — 6370000000 HC RX 637 (ALT 250 FOR IP): Performed by: STUDENT IN AN ORGANIZED HEALTH CARE EDUCATION/TRAINING PROGRAM

## 2024-10-11 PROCEDURE — 83605 ASSAY OF LACTIC ACID: CPT

## 2024-10-11 PROCEDURE — 96365 THER/PROPH/DIAG IV INF INIT: CPT

## 2024-10-11 PROCEDURE — 2580000003 HC RX 258: Performed by: STUDENT IN AN ORGANIZED HEALTH CARE EDUCATION/TRAINING PROGRAM

## 2024-10-11 PROCEDURE — 87186 SC STD MICRODIL/AGAR DIL: CPT

## 2024-10-11 PROCEDURE — 74176 CT ABD & PELVIS W/O CONTRAST: CPT

## 2024-10-11 PROCEDURE — 87070 CULTURE OTHR SPECIMN AEROBIC: CPT

## 2024-10-11 PROCEDURE — 80048 BASIC METABOLIC PNL TOTAL CA: CPT

## 2024-10-11 PROCEDURE — 2580000003 HC RX 258: Performed by: INTERNAL MEDICINE

## 2024-10-11 PROCEDURE — 96375 TX/PRO/DX INJ NEW DRUG ADDON: CPT

## 2024-10-11 PROCEDURE — 85025 COMPLETE CBC W/AUTO DIFF WBC: CPT

## 2024-10-11 PROCEDURE — 6360000002 HC RX W HCPCS: Performed by: INTERNAL MEDICINE

## 2024-10-11 PROCEDURE — 6370000000 HC RX 637 (ALT 250 FOR IP): Performed by: INTERNAL MEDICINE

## 2024-10-11 PROCEDURE — 93010 ELECTROCARDIOGRAM REPORT: CPT | Performed by: INTERNAL MEDICINE

## 2024-10-11 PROCEDURE — 76705 ECHO EXAM OF ABDOMEN: CPT

## 2024-10-11 PROCEDURE — 3E1M39Z IRRIGATION OF PERITONEAL CAVITY USING DIALYSATE, PERCUTANEOUS APPROACH: ICD-10-PCS | Performed by: HOSPITALIST

## 2024-10-11 PROCEDURE — 83690 ASSAY OF LIPASE: CPT

## 2024-10-11 PROCEDURE — 6360000002 HC RX W HCPCS: Performed by: STUDENT IN AN ORGANIZED HEALTH CARE EDUCATION/TRAINING PROGRAM

## 2024-10-11 PROCEDURE — 6370000000 HC RX 637 (ALT 250 FOR IP): Performed by: PHYSICIAN ASSISTANT

## 2024-10-11 PROCEDURE — 93005 ELECTROCARDIOGRAM TRACING: CPT | Performed by: STUDENT IN AN ORGANIZED HEALTH CARE EDUCATION/TRAINING PROGRAM

## 2024-10-11 PROCEDURE — 84145 PROCALCITONIN (PCT): CPT

## 2024-10-11 PROCEDURE — 87040 BLOOD CULTURE FOR BACTERIA: CPT

## 2024-10-11 PROCEDURE — 2580000003 HC RX 258: Performed by: HOSPITALIST

## 2024-10-11 PROCEDURE — 87636 SARSCOV2 & INF A&B AMP PRB: CPT

## 2024-10-11 PROCEDURE — 99285 EMERGENCY DEPT VISIT HI MDM: CPT

## 2024-10-11 PROCEDURE — 99223 1ST HOSP IP/OBS HIGH 75: CPT | Performed by: INTERNAL MEDICINE

## 2024-10-11 PROCEDURE — 2060000000 HC ICU INTERMEDIATE R&B

## 2024-10-11 PROCEDURE — 87077 CULTURE AEROBIC IDENTIFY: CPT

## 2024-10-11 PROCEDURE — 89051 BODY FLUID CELL COUNT: CPT

## 2024-10-11 PROCEDURE — 87205 SMEAR GRAM STAIN: CPT

## 2024-10-11 PROCEDURE — 80076 HEPATIC FUNCTION PANEL: CPT

## 2024-10-11 PROCEDURE — 71045 X-RAY EXAM CHEST 1 VIEW: CPT

## 2024-10-11 RX ORDER — ONDANSETRON 2 MG/ML
4 INJECTION INTRAMUSCULAR; INTRAVENOUS EVERY 6 HOURS PRN
Status: DISCONTINUED | OUTPATIENT
Start: 2024-10-11 | End: 2024-10-14 | Stop reason: HOSPADM

## 2024-10-11 RX ORDER — ACETAMINOPHEN 650 MG/1
650 SUPPOSITORY RECTAL EVERY 6 HOURS PRN
Status: DISCONTINUED | OUTPATIENT
Start: 2024-10-11 | End: 2024-10-14 | Stop reason: HOSPADM

## 2024-10-11 RX ORDER — RANOLAZINE 500 MG/1
500 TABLET, EXTENDED RELEASE ORAL 2 TIMES DAILY
Status: DISCONTINUED | OUTPATIENT
Start: 2024-10-11 | End: 2024-10-14 | Stop reason: HOSPADM

## 2024-10-11 RX ORDER — SODIUM CHLORIDE, SODIUM LACTATE, POTASSIUM CHLORIDE, AND CALCIUM CHLORIDE .6; .31; .03; .02 G/100ML; G/100ML; G/100ML; G/100ML
1000 INJECTION, SOLUTION INTRAVENOUS ONCE
Status: COMPLETED | OUTPATIENT
Start: 2024-10-11 | End: 2024-10-11

## 2024-10-11 RX ORDER — SODIUM CHLORIDE 9 MG/ML
INJECTION, SOLUTION INTRAVENOUS CONTINUOUS
Status: DISCONTINUED | OUTPATIENT
Start: 2024-10-11 | End: 2024-10-11

## 2024-10-11 RX ORDER — HEPARIN SODIUM 5000 [USP'U]/ML
5000 INJECTION, SOLUTION INTRAVENOUS; SUBCUTANEOUS EVERY 8 HOURS SCHEDULED
Status: DISCONTINUED | OUTPATIENT
Start: 2024-10-11 | End: 2024-10-14 | Stop reason: HOSPADM

## 2024-10-11 RX ORDER — LEVETIRACETAM 500 MG/1
500 TABLET ORAL DAILY
Status: DISCONTINUED | OUTPATIENT
Start: 2024-10-11 | End: 2024-10-14 | Stop reason: HOSPADM

## 2024-10-11 RX ORDER — CLOPIDOGREL BISULFATE 75 MG/1
75 TABLET ORAL DAILY
Status: DISCONTINUED | OUTPATIENT
Start: 2024-10-11 | End: 2024-10-14 | Stop reason: HOSPADM

## 2024-10-11 RX ORDER — SODIUM CHLORIDE 0.9 % (FLUSH) 0.9 %
5-40 SYRINGE (ML) INJECTION PRN
Status: DISCONTINUED | OUTPATIENT
Start: 2024-10-11 | End: 2024-10-14 | Stop reason: HOSPADM

## 2024-10-11 RX ORDER — ISOSORBIDE MONONITRATE 30 MG/1
30 TABLET, EXTENDED RELEASE ORAL DAILY
Status: DISCONTINUED | OUTPATIENT
Start: 2024-10-11 | End: 2024-10-14 | Stop reason: HOSPADM

## 2024-10-11 RX ORDER — ACETAMINOPHEN 325 MG/1
650 TABLET ORAL EVERY 6 HOURS PRN
Status: DISCONTINUED | OUTPATIENT
Start: 2024-10-11 | End: 2024-10-14 | Stop reason: HOSPADM

## 2024-10-11 RX ORDER — ASPIRIN 81 MG/1
81 TABLET ORAL DAILY
Status: DISCONTINUED | OUTPATIENT
Start: 2024-10-11 | End: 2024-10-14 | Stop reason: HOSPADM

## 2024-10-11 RX ORDER — ONDANSETRON 4 MG/1
4 TABLET, ORALLY DISINTEGRATING ORAL EVERY 8 HOURS PRN
Status: DISCONTINUED | OUTPATIENT
Start: 2024-10-11 | End: 2024-10-14 | Stop reason: HOSPADM

## 2024-10-11 RX ORDER — FENTANYL CITRATE 50 UG/ML
25 INJECTION, SOLUTION INTRAMUSCULAR; INTRAVENOUS ONCE
Status: COMPLETED | OUTPATIENT
Start: 2024-10-11 | End: 2024-10-11

## 2024-10-11 RX ORDER — HYDROMORPHONE HYDROCHLORIDE 1 MG/ML
0.25 INJECTION, SOLUTION INTRAMUSCULAR; INTRAVENOUS; SUBCUTANEOUS EVERY 4 HOURS PRN
Status: DISCONTINUED | OUTPATIENT
Start: 2024-10-11 | End: 2024-10-14 | Stop reason: HOSPADM

## 2024-10-11 RX ORDER — GLUCAGON 1 MG/ML
1 KIT INJECTION PRN
Status: DISCONTINUED | OUTPATIENT
Start: 2024-10-11 | End: 2024-10-13

## 2024-10-11 RX ORDER — ACETAMINOPHEN 500 MG
1000 TABLET ORAL ONCE
Status: COMPLETED | OUTPATIENT
Start: 2024-10-11 | End: 2024-10-11

## 2024-10-11 RX ORDER — POLYETHYLENE GLYCOL 3350 17 G/17G
17 POWDER, FOR SOLUTION ORAL DAILY PRN
Status: DISCONTINUED | OUTPATIENT
Start: 2024-10-11 | End: 2024-10-14 | Stop reason: HOSPADM

## 2024-10-11 RX ORDER — HYDROMORPHONE HYDROCHLORIDE 1 MG/ML
0.5 INJECTION, SOLUTION INTRAMUSCULAR; INTRAVENOUS; SUBCUTANEOUS EVERY 4 HOURS PRN
Status: DISCONTINUED | OUTPATIENT
Start: 2024-10-11 | End: 2024-10-14 | Stop reason: HOSPADM

## 2024-10-11 RX ORDER — ROSUVASTATIN CALCIUM 10 MG/1
10 TABLET, COATED ORAL EVERY EVENING
Status: DISCONTINUED | OUTPATIENT
Start: 2024-10-11 | End: 2024-10-14 | Stop reason: HOSPADM

## 2024-10-11 RX ORDER — DEXTROSE MONOHYDRATE 100 MG/ML
INJECTION, SOLUTION INTRAVENOUS CONTINUOUS PRN
Status: DISCONTINUED | OUTPATIENT
Start: 2024-10-11 | End: 2024-10-13

## 2024-10-11 RX ORDER — SODIUM CHLORIDE, SODIUM LACTATE, CALCIUM CHLORIDE, MAGNESIUM CHLORIDE AND DEXTROSE 2.5; 538; 448; 18.3; 5.08 G/100ML; MG/100ML; MG/100ML; MG/100ML; MG/100ML
2000 INJECTION, SOLUTION INTRAPERITONEAL EVERY 4 HOURS
Status: DISCONTINUED | OUTPATIENT
Start: 2024-10-11 | End: 2024-10-12

## 2024-10-11 RX ORDER — FUROSEMIDE 40 MG/1
40 TABLET ORAL 2 TIMES DAILY
Status: DISCONTINUED | OUTPATIENT
Start: 2024-10-11 | End: 2024-10-13

## 2024-10-11 RX ORDER — CARVEDILOL 6.25 MG/1
12.5 TABLET ORAL 2 TIMES DAILY WITH MEALS
Status: DISCONTINUED | OUTPATIENT
Start: 2024-10-11 | End: 2024-10-14

## 2024-10-11 RX ORDER — INSULIN LISPRO 100 [IU]/ML
0-8 INJECTION, SOLUTION INTRAVENOUS; SUBCUTANEOUS
Status: DISCONTINUED | OUTPATIENT
Start: 2024-10-11 | End: 2024-10-11 | Stop reason: ALTCHOICE

## 2024-10-11 RX ORDER — DIPHENHYDRAMINE HYDROCHLORIDE 50 MG/ML
25 INJECTION INTRAMUSCULAR; INTRAVENOUS ONCE
Status: COMPLETED | OUTPATIENT
Start: 2024-10-11 | End: 2024-10-11

## 2024-10-11 RX ORDER — SODIUM CHLORIDE 0.9 % (FLUSH) 0.9 %
5-40 SYRINGE (ML) INJECTION EVERY 12 HOURS SCHEDULED
Status: DISCONTINUED | OUTPATIENT
Start: 2024-10-11 | End: 2024-10-14 | Stop reason: HOSPADM

## 2024-10-11 RX ORDER — SODIUM CHLORIDE 9 MG/ML
INJECTION, SOLUTION INTRAVENOUS PRN
Status: DISCONTINUED | OUTPATIENT
Start: 2024-10-11 | End: 2024-10-11

## 2024-10-11 RX ORDER — ERGOCALCIFEROL 1.25 MG/1
50000 CAPSULE, LIQUID FILLED ORAL WEEKLY
COMMUNITY

## 2024-10-11 RX ORDER — CLOPIDOGREL BISULFATE 75 MG/1
75 TABLET ORAL DAILY
Status: CANCELLED | OUTPATIENT
Start: 2024-10-11

## 2024-10-11 RX ORDER — MORPHINE SULFATE 4 MG/ML
4 INJECTION, SOLUTION INTRAMUSCULAR; INTRAVENOUS ONCE
Status: COMPLETED | OUTPATIENT
Start: 2024-10-11 | End: 2024-10-11

## 2024-10-11 RX ORDER — SACUBITRIL AND VALSARTAN 49; 51 MG/1; MG/1
1 TABLET, FILM COATED ORAL 2 TIMES DAILY
COMMUNITY

## 2024-10-11 RX ADMIN — CEFEPIME 1000 MG: 1 INJECTION, POWDER, FOR SOLUTION INTRAMUSCULAR; INTRAVENOUS at 08:34

## 2024-10-11 RX ADMIN — SACUBITRIL AND VALSARTAN 1 TABLET: 24; 26 TABLET, FILM COATED ORAL at 22:07

## 2024-10-11 RX ADMIN — VANCOMYCIN HYDROCHLORIDE 500 MG: 500 INJECTION, POWDER, LYOPHILIZED, FOR SOLUTION INTRAVENOUS at 09:43

## 2024-10-11 RX ADMIN — FUROSEMIDE 40 MG: 40 TABLET ORAL at 11:42

## 2024-10-11 RX ADMIN — RANOLAZINE 500 MG: 500 TABLET, EXTENDED RELEASE ORAL at 11:42

## 2024-10-11 RX ADMIN — LEVETIRACETAM 500 MG: 500 TABLET, FILM COATED ORAL at 11:42

## 2024-10-11 RX ADMIN — HYDROMORPHONE HYDROCHLORIDE 0.5 MG: 1 INJECTION, SOLUTION INTRAMUSCULAR; INTRAVENOUS; SUBCUTANEOUS at 18:02

## 2024-10-11 RX ADMIN — SODIUM CHLORIDE: 9 INJECTION, SOLUTION INTRAVENOUS at 06:37

## 2024-10-11 RX ADMIN — HEPARIN SODIUM 5000 UNITS: 5000 INJECTION INTRAVENOUS; SUBCUTANEOUS at 06:38

## 2024-10-11 RX ADMIN — WATER 1000 MG: 1 INJECTION INTRAMUSCULAR; INTRAVENOUS; SUBCUTANEOUS at 04:41

## 2024-10-11 RX ADMIN — ACETAMINOPHEN 650 MG: 325 TABLET ORAL at 22:32

## 2024-10-11 RX ADMIN — MORPHINE SULFATE 4 MG: 4 INJECTION, SOLUTION INTRAMUSCULAR; INTRAVENOUS at 04:56

## 2024-10-11 RX ADMIN — RANOLAZINE 500 MG: 500 TABLET, EXTENDED RELEASE ORAL at 22:07

## 2024-10-11 RX ADMIN — VANCOMYCIN HYDROCHLORIDE 1000 MG: 1 INJECTION, POWDER, LYOPHILIZED, FOR SOLUTION INTRAVENOUS at 04:46

## 2024-10-11 RX ADMIN — HEPARIN SODIUM 5000 UNITS: 5000 INJECTION INTRAVENOUS; SUBCUTANEOUS at 22:07

## 2024-10-11 RX ADMIN — SODIUM CHLORIDE, PRESERVATIVE FREE 10 ML: 5 INJECTION INTRAVENOUS at 22:10

## 2024-10-11 RX ADMIN — ISOSORBIDE MONONITRATE 30 MG: 30 TABLET, EXTENDED RELEASE ORAL at 11:42

## 2024-10-11 RX ADMIN — FENTANYL CITRATE 25 MCG: 50 INJECTION INTRAMUSCULAR; INTRAVENOUS at 03:40

## 2024-10-11 RX ADMIN — SODIUM CHLORIDE, PRESERVATIVE FREE 10 ML: 5 INJECTION INTRAVENOUS at 12:21

## 2024-10-11 RX ADMIN — HYDROMORPHONE HYDROCHLORIDE 0.5 MG: 1 INJECTION, SOLUTION INTRAMUSCULAR; INTRAVENOUS; SUBCUTANEOUS at 12:22

## 2024-10-11 RX ADMIN — SODIUM CHLORIDE, POTASSIUM CHLORIDE, SODIUM LACTATE AND CALCIUM CHLORIDE 1000 ML: 600; 310; 30; 20 INJECTION, SOLUTION INTRAVENOUS at 03:42

## 2024-10-11 RX ADMIN — ASPIRIN 81 MG: 81 TABLET, COATED ORAL at 12:22

## 2024-10-11 RX ADMIN — SODIUM CHLORIDE, SODIUM LACTATE, CALCIUM CHLORIDE, MAGNESIUM CHLORIDE AND DEXTROSE 2000 ML: 2.5; 538; 448; 18.3; 5.08 INJECTION, SOLUTION INTRAPERITONEAL at 20:50

## 2024-10-11 RX ADMIN — DIPHENHYDRAMINE HYDROCHLORIDE 25 MG: 50 INJECTION INTRAMUSCULAR; INTRAVENOUS at 05:20

## 2024-10-11 RX ADMIN — ROSUVASTATIN CALCIUM 10 MG: 10 TABLET, FILM COATED ORAL at 22:32

## 2024-10-11 RX ADMIN — ACETAMINOPHEN 1000 MG: 500 TABLET ORAL at 03:40

## 2024-10-11 ASSESSMENT — PAIN DESCRIPTION - PAIN TYPE
TYPE: ACUTE PAIN
TYPE: ACUTE PAIN

## 2024-10-11 ASSESSMENT — PAIN DESCRIPTION - ONSET
ONSET: ON-GOING
ONSET: ON-GOING

## 2024-10-11 ASSESSMENT — PAIN SCALES - GENERAL
PAINLEVEL_OUTOF10: 4
PAINLEVEL_OUTOF10: 3
PAINLEVEL_OUTOF10: 7
PAINLEVEL_OUTOF10: 7
PAINLEVEL_OUTOF10: 2

## 2024-10-11 ASSESSMENT — PAIN DESCRIPTION - LOCATION
LOCATION: ABDOMEN

## 2024-10-11 ASSESSMENT — PAIN DESCRIPTION - DESCRIPTORS
DESCRIPTORS: ACHING

## 2024-10-11 ASSESSMENT — PAIN DESCRIPTION - ORIENTATION
ORIENTATION: UPPER
ORIENTATION: UPPER
ORIENTATION: ANTERIOR

## 2024-10-11 ASSESSMENT — PAIN DESCRIPTION - FREQUENCY
FREQUENCY: CONTINUOUS
FREQUENCY: CONTINUOUS

## 2024-10-11 ASSESSMENT — PAIN SCALES - WONG BAKER: WONGBAKER_NUMERICALRESPONSE: HURTS A LITTLE BIT

## 2024-10-11 NOTE — PROGRESS NOTES
[facility]   Nephrology progress Note  [phone]  [phone]   [facility]         Renal consult received. Full note to follow    Jack Turner, DO

## 2024-10-11 NOTE — PROGRESS NOTES
[facility]   Nephrology progress Note  [phone]  [phone]   [facility]         Renal consult received. Full note to follow    Jack Turner, DO

## 2024-10-11 NOTE — PROGRESS NOTES
Pharmacy Home Medication Reconciliation Note    A medication reconciliation has been completed for Te Garay 1965    Pharmacy: [facility] [place] [address]  Information provided by: patient, fill history    The patient's home medication list is as follows:  No current facility-administered medications on file prior to encounter.     Current Outpatient Medications on File Prior to Encounter   Medication Sig Dispense Refill    vitamin D (ERGOCALCIFEROL) 1.25 MG (55460 UT) CAPS capsule Take 1 capsule by mouth once a week      sacubitril-valsartan (ENTRESTO) 49-51 MG per tablet Take 1 tablet by mouth 2 times daily      ranolazine (RANEXA) 500 MG extended release tablet TAKE ONE TABLET BY MOUTH TWICE DAILY. 180 tablet 2    isosorbide mononitrate (IMDUR) 30 MG extended release tablet TAKE 1 TABLET BY MOUTH DAILY (Patient taking differently: Take 1 tablet by mouth at bedtime) 90 tablet 2    clopidogrel (PLAVIX) 75 MG tablet Take 1 tablet by mouth daily 90 tablet 2    aspirin 81 MG EC tablet Take 1 tablet by mouth daily 90 tablet 0    levETIRAcetam (KEPPRA) 500 MG tablet Take 1 tablet by mouth daily 60 tablet 3    carvedilol (COREG) 12.5 MG tablet Take 1 tablet by mouth 2 times daily (with meals) (Patient taking differently: Take 2 tablets by mouth 2 times daily (with meals)) 60 tablet 3    rosuvastatin (CRESTOR) 40 MG tablet Take 1 tablet by mouth every evening 90 tablet 0    insulin lispro (HUMALOG) 100 UNIT/ML SOLN injection vial Use 30-40 units daily via pump (Patient taking differently: by Insulin Pump - SubCUTAneous Infusion route Use 30-40 units daily via pump) 20 mL 2    furosemide (LASIX) 40 MG tablet Take 1 tablet by mouth in the morning and 1 tablet in the evening. 60 tablet 3       Of note, Entresto, vitamin D were added. Coreg dose updated to 25mg BID. Patient states he takes calcitriol but unable to find recent fill history to correlate.    Timing of last doses updated.    Thank

## 2024-10-11 NOTE — ED PROVIDER NOTES
[facility] EMERGENCY DEPARTMENT  EMERGENCY DEPARTMENT ENCOUNTER        Pt Name: Te Garay  MRN: [id number]  Birthdate 1965  Date of evaluation: 10/11/2024  Provider: Lucia Goldman PA-C  PCP: Reid Lei MD  Note Started: 3:57 AM EDT 10/11/24       I have seen and evaluated this patient with my supervising physician Benigno Kline DO.      CHIEF COMPLAINT       Chief Complaint   Patient presents with    Abdominal Pain     Pt  came in from home, pt reports abdominal pain and diarrhea since Thursday, pt is a peritoneal dialysis pt, pt reports, they were told to come in if they ever get this pain for concerns of peritonitis.       HISTORY OF PRESENT ILLNESS: 1 or more Elements     History From: Patient  Limitations to history : None    Te Garay is a 59 y.o. male who presents to the emergency department today for evaluation for concerns of generally not feeling well.  The patient reports that he is a peritoneal dialysis patient, and reports that he has had peritoneal dialysis catheter for the past 4 years.  The patient reports that he has noticed over the past 2 days that he has noticed some cloudiness to the fluid.  The patient reports that tonight he did notice pain when trying to do the peritoneal dialysis, and reports that he did have a low-grade fever.  He was told to come to the ED for further care and evaluation and concern for peritonitis.  I will the patient arrives to the ED, he is febrile here 100.8.  He reports tactile fevers at home.  He reports some discomfort to his abdomen, but mostly when around his catheter.  He denies any nausea, vomiting.  He has no diarrhea.  No cough or congestion.  He is no dysuria hematuria.  No other complaints.    Nursing Notes were all reviewed and agreed with or any disagreements were addressed in the HPI.    REVIEW OF SYSTEMS :      Review of Systems   Constitutional:  Positive for fever. Negative for activity change, appetite change  Neutrophil Count, Fluid: 90  Source identified. [PB]   0600 Patient revisited bedside.  Somnolent but easily arousable.  States that he is somewhat fatigued, but also did not sleep very well last night.  Pulse 85-90 bpm.  Discussed results and plan for admission to the hospital.  Patient verbalized understanding agreement with plan.    Order for consult inpatient hospitalist placed.  Message sent to inpatient team via [employer]. [PB]   0610 Procalcitonin(!): 0.80 [PB]   0610 COVID-19 & Influenza Combo:    SARS-CoV-2 RNA, RT PCR NOT DETECTED   Influenza A NOT DETECTED   Influenza B NOT DETECTED [PB]      ED Course User Index  [PB] Benigno Kline, DO            Is this patient to be included in the SEP-1 Core Measure due to severe sepsis or septic shock?   No   Exclusion criteria - the patient is NOT to be included for SEP-1 Core Measure due to:  May have criteria for sepsis, but does not meet criteria for severe sepsis or septic shock    Chronic Conditions affecting care:  has a past medical history of Angina at rest ([facility]), Cardiomyopathy ([facility]), Carotid artery stenosis (10/25/2016), Charcot foot due to diabetes mellitus ([facility]) (07/01/2024), CHF (congestive heart failure) ([facility]) (03/03/2015), CKD (chronic kidney disease) stage 2, GFR 60-89 ml/min, Coronary artery disease, Coronary artery disease involving native heart with angina pectoris ([facility]) (05/26/2010), De Quervain thyroiditis (06/08/2023), Diabetic peripheral neuropathy ([facility]), Diabetic polyneuropathy associated with type 1 diabetes mellitus ([facility]) (07/01/2024), Diastolic HF (heart failure) ([facility]), Essential hypertension (01/12/2019), History of COVID-19 (04/16/2024), Hyperlipidemia (05/26/2010), Hyperlipidemia with target LDL less than 70, Hyperparathyroidism due to renal insufficiency ([facility]) (04/25/2024), Hypertension goal BP (blood pressure) < 130/80, Peritonitis associated with peritoneal dialysis ([facility]) (12/07/2021), PVD (peripheral vascular disease) ([facility]),

## 2024-10-11 NOTE — CONSULTS
Infectious Diseases   Consult Note        Admission Date: 10/11/2024  Hospital Day: Hospital Day: 1   Attending: Jayla Rubio MD  Date of service: 10/11/24     Reason for admission: Peritonitis associated with peritoneal dialysis, initial encounter ([facility]) [T85.71XA]    Chief complaint/ Reason for consult: Sepsis, PD catheter initiated peritonitis    Microbiology:        I have reviewed allavailable micro lab data and cultures    Results       Procedure Component Value Units Date/Time    Gram Stain [[id number]]     Order Status: Sent Specimen: Peritoneal Dialysis Fluid     Culture, Body Fluid [[id number]]     Order Status: Sent Specimen: Body Fluid from Peritoneal Dialysis Fluid     Culture, Body Fluid [[id number]]     Order Status: Sent Specimen: Body Fluid from Peritoneal Dialysis Fluid     COVID-19 & Influenza Combo [[id number]] Collected: 10/11/24 0452    Order Status: Completed Specimen: Nasopharyngeal Swab Updated: 10/11/24 0602     SARS-CoV-2 RNA, RT PCR NOT DETECTED     Comment: Not Detected results do not preclude SARS-CoV-2 infection and  should not be used as the sole basis for patient management  decisions.  Results must be combined with clinical observations,  patient history, and epidemiological information.  Testing was performed using NAOMI JOSE MANUEL SARS-CoV-2 and Influenza A/B  nucleic acid assay. This test is a multiplex Real-Time Reverse  Transcriptase Polymerase Chain Reaction (RT-PCR)-based in vitro  diagnostic test intended for the qualitative detection of nucleic  acids from SARS-CoV-2, influenza A, and influenza B in nasopharyngeal  and nasal swab specimens for use under the FDA’s Emergency Use  Authorization (EUA) only.    Patient Fact Sheet:  https://www.fda.gov/media/175684/download  Provider Fact Sheet: https://www.fda.gov/media/795411/download  EUA: https://www.fda.gov/media/526812/download  IFU: https://www.fda.gov/media/766500/download    Methodology:  RT-PCR          Influenza A NOT  him that he will get cardiac clearance, but he needs to have angioplasty at some point because there is a small lesion that needs to be addr    De Quervain thyroiditis 06/08/2023    Last Assessment & Plan:    Formatting of this note might be different from the original.   He has left texting thumb x 3 months.  This is not carpal tunnel.  Evidently, he has texted more over the last 1.5 years than before because his wife would like a more immediate response.  He cannot receive NSAIDS, and I cannot give a steroid injection.  Review of the UpToDate does state that immobility does    Diabetic peripheral neuropathy ([facility])     Diabetic polyneuropathy associated with type 1 diabetes mellitus ([facility]) 07/01/2024    Diastolic HF (heart failure) ([facility])     Essential hypertension 01/12/2019    Formatting of this note might be different from the original.   Fairly controlled      Last Assessment & Plan:    Formatting of this note might be different from the original.   Blood pressure is controlled.    History of COVID-19 04/16/2024    Hyperlipidemia 05/26/2010    Hyperlipidemia with target LDL less than 70     Hyperparathyroidism due to renal insufficiency ([facility]) 04/25/2024    Hypertension goal BP (blood pressure) < 130/80     Peritonitis associated with peritoneal dialysis ([facility]) 12/07/2021    PVD (peripheral vascular disease) ([facility])     S/P CABG (coronary artery bypass graft) 05/24/2018    Seizure disorder ([facility])     Seizures ([facility])     in childhood    Stenosis of carotid artery     Toe osteomyelitis, left 07/01/2024    Type 1 diabetes mellitus with chronic kidney disease ([facility])     c-peptide <0.1 in 2015         Past Surgical History: All pastsurgical history was reviewed today.    Past Surgical History:   Procedure Laterality Date    BLADDER SURGERY Left 08/24/2023    CYSTOSCOPY, LEFT URETEROSCOPY, STONE BASKET MANIPULATION, PLACEMENT OF LEFT URETERAL STENT performed by Chevy Sepulveda MD at [facility] OR    CARDIAC CATHETERIZATION

## 2024-10-11 NOTE — ED NOTES
Specimen obtained from peritoneal catheter. Sterile technique maintained. Patient tolerated without complaints.

## 2024-10-11 NOTE — CONSULTS
25   BUN 47*   CREATININE 7.4*   CALCIUM 8.2*       Liver panel:  Recent Labs     10/11/24  0328   AST 10*   ALT 8*       Endocrine:   @[id number](VitD,PTH,TSH,aldosterone,renin activity,cortisol,metanephrine)@    CBC:   Recent Labs     10/11/24  0328   WBC 5.1   HGB 11.2*   HCT 35.2*   MCV 88.3              IMPRESSION/RECOMMENDATIONS:      ESRD - on CCPD at home. Concern for peritonitis. Received IV abx in ED. Cloudy fluid at home. Didn't do PD night after admission. Is above dry weight. Will prescribe CAPD for today. 2 L dwell volume, q4 hr exchanges with 2.5% dextrose solution  - stop IVF. Hold lasix for now.     Peritonitis - suspect 2/2 PD. Fluid studies suggestive for peritonitis. CT with findings equivocal to cholecystitis. On IV abx. ID is also consulted.     Hypertension - continue coreg, imdur, entresto    Anemia of CKD - hgb at goal. Monitor    AYSHA - monitor phos      Thank you for allowing me to participate in the care of this patient. Please contact via [employer] if any questions or concerns.       Jack Turner DO  10/11/2024

## 2024-10-11 NOTE — ED PROVIDER NOTES
range)   heparin (porcine) injection 5,000 Units (has no administration in time range)   ondansetron (ZOFRAN-ODT) disintegrating tablet 4 mg (has no administration in time range)     Or   ondansetron (ZOFRAN) injection 4 mg (has no administration in time range)   polyethylene glycol (GLYCOLAX) packet 17 g (has no administration in time range)   acetaminophen (TYLENOL) tablet 650 mg (has no administration in time range)     Or   acetaminophen (TYLENOL) suppository 650 mg (has no administration in time range)   0.9 % sodium chloride infusion (has no administration in time range)   lactated ringers bolus 1,000 mL (0 mLs IntraVENous Stopped 10/11/24 0546)   acetaminophen (TYLENOL) tablet 1,000 mg (1,000 mg Oral Given 10/11/24 0340)   fentaNYL (SUBLIMAZE) injection 25 mcg (25 mcg IntraVENous Given 10/11/24 0340)   vancomycin (VANCOCIN) 1,000 mg in sodium chloride 0.9 % 250 mL IVPB ([id number]) (1,000 mg IntraVENous New Bag 10/11/24 0446)   morphine injection 4 mg (4 mg IntraVENous Given 10/11/24 0456)   diphenhydrAMINE (BENADRYL) injection 25 mg (25 mg IntraVENous Given 10/11/24 0520)     Social determinants of health:   None applicable    Chronic conditions potentially affecting care:   hypertension, diabetes mellitus, CAD, hyperlipidemia, and thyroid disease    I, Dr. Kline, am the primary clinician of record.        CRITICAL CARE     Upon my evaluation, this patient had a high probability of imminent or life-threatening deterioration due to sepsis which required my direct attention, intervention, and personal management.    The total critical care time personally spent while evaluating and treating this patient was 35 minutes exclusive of any time spent doing separately billable procedures.  This includes time at the bedside, data interpretation, medication management, monitoring for potential decompensation and physician consultation.  Specifics of interventions taken and potentially life-threatening diagnostic  considerations are listed above in the medical decision making.        FINAL IMPRESSION     1. Peritonitis associated with peritoneal dialysis, initial encounter (HCC)    2. Septicemia (HCC)          DISPOSITION/PLAN     DISPOSITION Admitted 10/11/2024 06:05:01 AM  Condition at Disposition: Data Unavailable    Blood pressure (!) 140/77, pulse 91, temperature 99.1 °F (37.3 °C), temperature source Oral, resp. rate 16, height 1.727 m (5' 8\"), weight 86.2 kg (190 lb), SpO2 96%.    PATIENT REFERRALS  No follow-up provider specified.  DISCHARGE MEDICATIONS  New Prescriptions    No medications on file     DISCONTINUED MEDICATIONS  Discontinued Medications    No medications on file         Note electronically signed by:   Benigno Kline DO  Attending emergency physician    This chart was generated using the Dragon dictation system.  I created this record, but it may contain dictation errors given the limitations of this technology.       Benigno Kline DO  10/11/24 0612

## 2024-10-11 NOTE — PROGRESS NOTES
Clinical Pharmacy Note: Pharmacy to Dose Vancomycin    Te Garay is a 59 y.o. male started on Vancomycin for peritonitis; consult received from Dr. Don to manage therapy. Also receiving the following antibiotics: cefepime.    Goal Trough Level: 15-20 mcg/mL    Assessment/Plan:  A 1000 mg loading dose was given on 10/11 at 0446. Will order an additional 500mg to complete the loading dose.  Patient is ESRD on PD, will plan to dose by levels  A vancomycin random level has been ordered for 10/13 at 0600  Changes in regimen will be determined based on culture results, renal function, and clinical response.  Pharmacy will continue to monitor and adjust regimen as necessary.    Allergies:  Iodides, Shellfish-derived products, Lipitor [atorvastatin], and Atorvastatin calcium     Recent Labs     10/11/24  0318   CREATININE 7.4*       Recent Labs     10/11/24  0328   WBC 5.1       Ht Readings from Last 1 Encounters:   10/11/24 1.727 m (5' 8\")        Wt Readings from Last 1 Encounters:   10/11/24 86.2 kg (190 lb)         Estimated Creatinine Clearance: 12 mL/min (A) (based on SCr of 7.4 mg/dL (HH)).      Thank you for the consult,    Maggy Garduno, PharmD, BCCCP

## 2024-10-11 NOTE — H&P
V2.0  History and Physical      Name:  Te Garay /Age/Sex: 1965  (59 y.o. male)   MRN & CSN:  [id number] & [id number] Encounter Date/Time: 10/11/2024 7:20 AM EDT   Location:  ED- PCP: Reid Lei MD       Hospital Day: 1    Assessment and Plan:   Te Garay is a 59 y.o. male with a pmh of ESRD on PD, CAD, HFrEF, HTN, seizures, CVA involving the TAMICA, PVD, T1DM presented from home with complaints of abdominal pain, cloudy PD catheter fluid, subjective fever and chills concerning for Peritonitis associated with peritoneal dialysis, initial encounter ([facility])    Hospital Problems             Last Modified POA    * (Principal) Peritonitis associated with peritoneal dialysis, initial encounter ([facility]) 10/11/2024 Yes    Septicemia ([facility]) 10/11/2024 Yes    Generalized abdominal pain 10/11/2024 Yes    Seizure disorder ([facility]) 10/11/2024 Yes    Calculus of gallbladder without cholecystitis without obstruction 10/11/2024 Yes    Overweight (BMI 25.0-29.9) 10/11/2024 Yes    Diabetic nephropathy associated with type 2 diabetes mellitus ([facility]) 10/11/2024 Yes    S/P CABG (coronary artery bypass graft) 10/11/2024 Yes    Diarrhea 10/11/2024 Yes       Plan:    Peritonitis associated with PD:  Continue empiric antibiotics pending ID consult.  Follow-up PD fluid cultures and CT abdomen and pelvis.  Nephrology consult.    CAD, HFrEF without acute decompensation:  TTE 2024: LVEF 35 to 40% with regional wall motion abnormalities.  Continue Ranexa, Coreg, Entresto, Lasix, Crestor, Imdur.  BP borderline low; doses decreased.    Recent acute stroke (2024): Continue aspirin and statin.     Insulin dependent diabetes mellitus 1:   A1c 8.0% 2024.  Continue insulin.    ESRD on PD: Nephrology consulted.    Seizure disorder: Continue AEDs.  Seizure precautions.    Disposition:   Current Living situation: Home  Expected Disposition: Home  Estimated D/C: At least 48 hours    Diet ADULT DIET; Regular; 4 carb choices  [facility]    Financial Resource Strain     Financial Resource Strain: 0   Food Insecurity: No Transportation Needs (8/28/2024)    Received from  [facility], [facility]    Yearly Questionnaire     Do you need any assistance with obtaining housing, meals, medication, transportation or medical equipment?: No   Transportation Needs: No Transportation Needs (8/28/2024)    Received from  [facility], [facility], [facility]    Yearly Questionnaire     Do you need any assistance with obtaining housing, meals, medication, transportation or medical equipment?: No   Recent Concern: Transportation Needs - Unmet Transportation Needs (7/31/2024)    PRAPARE - Transportation     Lack of Transportation (Medical): No     Lack of Transportation (Non-Medical): Yes   Physical Activity: Not on File (8/26/2019)    Received from [facility]    Physical Activity     Physical Activity: 0   Stress: Not on File (8/26/2019)    Received from [facility]    Stress     Stress: 0   Social Connections: Not on File (8/26/2019)    Received from [facility]    Social Connections     Social Connections and Isolation: 0    Received from [facility] [employer], [facility] [employer]    Interpersonal Safety   Housing Stability: No Transportation Needs (8/28/2024)    Received from  [facility],  [facility], [facility]    Yearly Questionnaire     Do you need any assistance with obtaining housing, meals, medication, transportation or medical equipment?: No       Medications:   Medications:    sodium chloride flush  5-40 mL IntraVENous 2 times per day    heparin (porcine)  5,000 Units SubCUTAneous 3 times per day    aspirin  81 mg Oral Daily    carvedilol  12.5 mg Oral BID WC    [Held by provider] furosemide  40 mg Oral BID    isosorbide mononitrate  30 mg Oral Daily    levETIRAcetam  500 mg Oral Daily    ranolazine  500 mg Oral BID    rosuvastatin  10 mg Oral QPM    vancomycin (VANCOCIN) intermittent dosing

## 2024-10-11 NOTE — CARE COORDINATION
Wound Referral Progress Note       NAME:  Te Garay  MEDICAL RECORD NUMBER:  [id number]  AGE: 59 y.o.   GENDER: male  : 1965  TODAY'S DATE:  10/11/2024    Subjective   Reason for WOCN Evaluation and Assessment: wound present on admission      Te Garay is a 59 y.o. male referred by:   Nursing    Wound Identification:  Wound Type: diabetic  Contributing Factors: diabetes PD    Wound History: present on admission  Current Wound Care Treatment:   dry dressing, wound cleansed with betadine    Patient Care Goal:  Wound Healing        PAST MEDICAL HISTORY        Diagnosis Date    Angina at rest ([facility])     Cardiomyopathy ([facility])     Carotid artery stenosis 10/25/2016    SUZANNA stented with 8 x 30 mm non-tapered Xact stent    Charcot foot due to diabetes mellitus ([facility]) 2024    CHF (congestive heart failure) ([facility]) 2015    EF 30%     CKD (chronic kidney disease) stage 2, GFR 60-89 ml/min     Coronary artery disease     sp CABG UC    Coronary artery disease involving native heart with angina pectoris ([facility]) 2010    Formatting of this note might be different from the original.   s/p CABG      Last Assessment & Plan:    Formatting of this note might be different from the original.   s/p PTCA and stent during  hosptialization.        Cardiology (Dr. Celestin)  has told him that he will get cardiac clearance, but he needs to have angioplasty at some point because there is a small lesion that needs to be addr    De Quervain thyroiditis 2023    Last Assessment & Plan:    Formatting of this note might be different from the original.   He has left texting thumb x 3 months.  This is not carpal tunnel.  Evidently, he has texted more over the last 1.5 years than before because his wife would like a more immediate response.  He cannot receive NSAIDS, and I cannot give a steroid injection.  Review of the UpToDate does state that immobility does    Diabetic peripheral neuropathy ([facility])     Diabetic  disease due to lipid rich plaque I25.10, I25.83    HTN (hypertension), benign I10    Type 1 diabetes mellitus ([facility]) E10.9    Cardiomyopathy ([facility]) I42.9    Coronary artery disease of autologous vein bypass graft with stable angina pectoris ([facility]) I25.718    Anemia due to stage 5 chronic kidney disease, not on chronic dialysis ([facility]) N18.5, D63.1    Chest pain R07.9    CAD (coronary artery disease) I25.10    Peritonitis associated with peritoneal dialysis, initial encounter ([facility]) T85.71XA    Calculus of gallbladder without cholecystitis without obstruction K80.20    Overweight (BMI 25.0-29.9) E66.3    Diabetic nephropathy associated with type 2 diabetes mellitus ([facility]) E11.21    S/P CABG (coronary artery bypass graft) Z95.1    Diarrhea R19.7       Measurements:  Wound 08/17/23 Foot Left;Lateral (Active)   Number of days: 421       Wound 10/11/24 Foot Right;Lateral dry wound (Active)   Wound Image   10/11/24 1820   Wound Etiology Diabetic 10/11/24 1820   Dressing Status New dressing applied 10/11/24 1820   Wound Cleansed Betadine/povidone iodine 10/11/24 1820   Dressing/Treatment Dry dressing;Roll gauze;Coban/self-adherent bandages 10/11/24 1820   Wound Assessment Dry 10/11/24 1820   Katherine-wound Assessment Dry/flaky;Intact 10/11/24 1820   Number of days: 0   Patient seen for wound present on admission. Patient goes to Dr Lowe (podiatry).  Patient agreeable to visit.  Wound is dry, almost healed over. Some swelling noted, no open areas no drainage. Per patient, Dr Lowe instructed him to paint daily with betadine and to avoid putting pressure on it as it continues to heal.  Will place wound care orders.           Response to treatment:  Well tolerated by patient.     Pain Assessment:  Severity:  0 / 10  Quality of pain: N/A  Wound Pain Timing/Severity: none  Premedicated: No    Plan   Plan of Care: Wound 10/11/24 Foot Right;Lateral dry wound-Dressing/Treatment: Dry dressing, Roll gauze, Coban/self-adherent

## 2024-10-12 LAB
ALBUMIN SERPL-MCNC: 2.9 G/DL (ref 3.4–5)
ALBUMIN/GLOB SERPL: 1 {RATIO} (ref 1.1–2.2)
ALP SERPL-CCNC: 81 U/L (ref 40–129)
ALT SERPL-CCNC: 6 U/L (ref 10–40)
ANION GAP SERPL CALCULATED.3IONS-SCNC: 15 MMOL/L (ref 3–16)
APPEARANCE FLUID: NORMAL
AST SERPL-CCNC: 9 U/L (ref 15–37)
BACTERIA URNS QL MICRO: NORMAL /HPF
BASOPHILS # BLD: 0 K/UL (ref 0–0.2)
BASOPHILS NFR BLD: 0.3 %
BDY FLUID QUALITY: NORMAL
BILIRUB SERPL-MCNC: <0.2 MG/DL (ref 0–1)
BILIRUB UR QL STRIP.AUTO: NEGATIVE
BUN SERPL-MCNC: 53 MG/DL (ref 7–20)
CALCIUM SERPL-MCNC: 8.3 MG/DL (ref 8.3–10.6)
CELL COUNT FLUID TYPE: NORMAL
CHLORIDE SERPL-SCNC: 96 MMOL/L (ref 99–110)
CLARITY UR: CLEAR
CO2 SERPL-SCNC: 23 MMOL/L (ref 21–32)
COLOR FLUID: COLORLESS
COLOR UR: YELLOW
CREAT SERPL-MCNC: 7.7 MG/DL (ref 0.9–1.3)
DEPRECATED RDW RBC AUTO: 14.1 % (ref 12.4–15.4)
EOSINOPHIL # BLD: 0.1 K/UL (ref 0–0.6)
EOSINOPHIL NFR BLD: 1.9 %
EOSINOPHIL NFR FLD MANUAL: 1 %
EPI CELLS #/AREA URNS AUTO: 1 /HPF (ref 0–5)
GFR SERPLBLD CREATININE-BSD FMLA CKD-EPI: 8 ML/MIN/{1.73_M2}
GLUCOSE BLD-MCNC: 165 MG/DL (ref 70–99)
GLUCOSE BLD-MCNC: 168 MG/DL (ref 70–99)
GLUCOSE BLD-MCNC: 252 MG/DL (ref 70–99)
GLUCOSE BLD-MCNC: 434 MG/DL (ref 70–99)
GLUCOSE BLD-MCNC: 461 MG/DL (ref 70–99)
GLUCOSE BLD-MCNC: 469 MG/DL (ref 70–99)
GLUCOSE BLD-MCNC: 90 MG/DL (ref 70–99)
GLUCOSE SERPL-MCNC: 474 MG/DL (ref 70–99)
GLUCOSE UR STRIP.AUTO-MCNC: >=1000 MG/DL
HCT VFR BLD AUTO: 33.6 % (ref 40.5–52.5)
HGB BLD-MCNC: 10.8 G/DL (ref 13.5–17.5)
HGB UR QL STRIP.AUTO: NEGATIVE
HYALINE CASTS #/AREA URNS AUTO: 0 /LPF (ref 0–8)
KETONES UR STRIP.AUTO-MCNC: ABNORMAL MG/DL
LEUKOCYTE ESTERASE UR QL STRIP.AUTO: ABNORMAL
LYMPHOCYTES # BLD: 0.8 K/UL (ref 1–5.1)
LYMPHOCYTES NFR BLD: 25.2 %
LYMPHOCYTES NFR FLD: 7 %
MACROPHAGES # FLD: 5 %
MCH RBC QN AUTO: 28.5 PG (ref 26–34)
MCHC RBC AUTO-ENTMCNC: 32 G/DL (ref 31–36)
MCV RBC AUTO: 89 FL (ref 80–100)
MONOCYTES # BLD: 0.3 K/UL (ref 0–1.3)
MONOCYTES NFR BLD: 10.4 %
MONOCYTES NFR FLD: 4 %
NEUTROPHIL, FLUID: 83 %
NEUTROPHILS # BLD: 2 K/UL (ref 1.7–7.7)
NEUTROPHILS NFR BLD: 62.2 %
NITRITE UR QL STRIP.AUTO: NEGATIVE
NUC CELL # FLD: 1398 /CUMM
PATH REV: NO
PERFORMED ON: ABNORMAL
PERFORMED ON: NORMAL
PH UR STRIP.AUTO: 5.5 [PH] (ref 5–8)
PHOSPHATE SERPL-MCNC: 3.4 MG/DL (ref 2.5–4.9)
PLATELET # BLD AUTO: 188 K/UL (ref 135–450)
PMV BLD AUTO: 10.6 FL (ref 5–10.5)
POTASSIUM SERPL-SCNC: 3.8 MMOL/L (ref 3.5–5.1)
PROT SERPL-MCNC: 5.9 G/DL (ref 6.4–8.2)
PROT UR STRIP.AUTO-MCNC: 100 MG/DL
RBC # BLD AUTO: 3.78 M/UL (ref 4.2–5.9)
RBC CLUMPS #/AREA URNS AUTO: 0 /HPF (ref 0–4)
RBC FLUID: <1000 /CUMM
SODIUM SERPL-SCNC: 134 MMOL/L (ref 136–145)
SP GR UR STRIP.AUTO: 1.01 (ref 1–1.03)
TOTAL CELLS COUNTED FLD: 100
UA COMPLETE W REFLEX CULTURE PNL UR: ABNORMAL
UA DIPSTICK W REFLEX MICRO PNL UR: YES
URN SPEC COLLECT METH UR: ABNORMAL
UROBILINOGEN UR STRIP-ACNC: 0.2 E.U./DL
WBC # BLD AUTO: 3.2 K/UL (ref 4–11)
WBC #/AREA URNS AUTO: 4 /HPF (ref 0–5)

## 2024-10-12 PROCEDURE — 89051 BODY FLUID CELL COUNT: CPT

## 2024-10-12 PROCEDURE — 6360000002 HC RX W HCPCS: Performed by: INTERNAL MEDICINE

## 2024-10-12 PROCEDURE — 81001 URINALYSIS AUTO W/SCOPE: CPT

## 2024-10-12 PROCEDURE — 6370000000 HC RX 637 (ALT 250 FOR IP): Performed by: STUDENT IN AN ORGANIZED HEALTH CARE EDUCATION/TRAINING PROGRAM

## 2024-10-12 PROCEDURE — 80053 COMPREHEN METABOLIC PANEL: CPT

## 2024-10-12 PROCEDURE — 6370000000 HC RX 637 (ALT 250 FOR IP): Performed by: INTERNAL MEDICINE

## 2024-10-12 PROCEDURE — 6370000000 HC RX 637 (ALT 250 FOR IP): Performed by: NURSE PRACTITIONER

## 2024-10-12 PROCEDURE — 83036 HEMOGLOBIN GLYCOSYLATED A1C: CPT

## 2024-10-12 PROCEDURE — 2060000000 HC ICU INTERMEDIATE R&B

## 2024-10-12 PROCEDURE — 2580000003 HC RX 258: Performed by: STUDENT IN AN ORGANIZED HEALTH CARE EDUCATION/TRAINING PROGRAM

## 2024-10-12 PROCEDURE — A4722 DIALYS SOL FLD VOL > 1999CC: HCPCS | Performed by: INTERNAL MEDICINE

## 2024-10-12 PROCEDURE — 2580000003 HC RX 258: Performed by: INTERNAL MEDICINE

## 2024-10-12 PROCEDURE — 84100 ASSAY OF PHOSPHORUS: CPT

## 2024-10-12 PROCEDURE — 6360000002 HC RX W HCPCS: Performed by: STUDENT IN AN ORGANIZED HEALTH CARE EDUCATION/TRAINING PROGRAM

## 2024-10-12 PROCEDURE — 36415 COLL VENOUS BLD VENIPUNCTURE: CPT

## 2024-10-12 PROCEDURE — 85025 COMPLETE CBC W/AUTO DIFF WBC: CPT

## 2024-10-12 PROCEDURE — 2580000003 HC RX 258: Performed by: HOSPITALIST

## 2024-10-12 RX ORDER — DEXTROSE MONOHYDRATE 100 MG/ML
INJECTION, SOLUTION INTRAVENOUS CONTINUOUS PRN
Status: DISCONTINUED | OUTPATIENT
Start: 2024-10-12 | End: 2024-10-12 | Stop reason: SDUPTHER

## 2024-10-12 RX ORDER — INSULIN GLARGINE 100 [IU]/ML
20 INJECTION, SOLUTION SUBCUTANEOUS 2 TIMES DAILY
Status: DISCONTINUED | OUTPATIENT
Start: 2024-10-12 | End: 2024-10-12

## 2024-10-12 RX ORDER — INSULIN LISPRO 100 [IU]/ML
0-8 INJECTION, SOLUTION INTRAVENOUS; SUBCUTANEOUS
Status: DISCONTINUED | OUTPATIENT
Start: 2024-10-12 | End: 2024-10-13

## 2024-10-12 RX ORDER — INSULIN GLARGINE 100 [IU]/ML
20 INJECTION, SOLUTION SUBCUTANEOUS DAILY
Status: DISCONTINUED | OUTPATIENT
Start: 2024-10-13 | End: 2024-10-13

## 2024-10-12 RX ORDER — INSULIN LISPRO 100 [IU]/ML
12 INJECTION, SOLUTION INTRAVENOUS; SUBCUTANEOUS
Status: DISCONTINUED | OUTPATIENT
Start: 2024-10-12 | End: 2024-10-12

## 2024-10-12 RX ORDER — GLUCAGON 1 MG/ML
1 KIT INJECTION PRN
Status: DISCONTINUED | OUTPATIENT
Start: 2024-10-12 | End: 2024-10-12 | Stop reason: SDUPTHER

## 2024-10-12 RX ORDER — DIPHENHYDRAMINE HYDROCHLORIDE 50 MG/ML
25 INJECTION INTRAMUSCULAR; INTRAVENOUS EVERY 6 HOURS PRN
Status: DISCONTINUED | OUTPATIENT
Start: 2024-10-12 | End: 2024-10-14 | Stop reason: HOSPADM

## 2024-10-12 RX ORDER — INSULIN GLARGINE 100 [IU]/ML
16 INJECTION, SOLUTION SUBCUTANEOUS ONCE
Status: COMPLETED | OUTPATIENT
Start: 2024-10-12 | End: 2024-10-12

## 2024-10-12 RX ORDER — INSULIN LISPRO 100 [IU]/ML
16 INJECTION, SOLUTION INTRAVENOUS; SUBCUTANEOUS ONCE
Status: COMPLETED | OUTPATIENT
Start: 2024-10-12 | End: 2024-10-12

## 2024-10-12 RX ORDER — INSULIN LISPRO 100 [IU]/ML
12 INJECTION, SOLUTION INTRAVENOUS; SUBCUTANEOUS ONCE
Status: COMPLETED | OUTPATIENT
Start: 2024-10-12 | End: 2024-10-12

## 2024-10-12 RX ORDER — INSULIN LISPRO 100 [IU]/ML
4 INJECTION, SOLUTION INTRAVENOUS; SUBCUTANEOUS
Status: DISCONTINUED | OUTPATIENT
Start: 2024-10-12 | End: 2024-10-13

## 2024-10-12 RX ORDER — SODIUM CHLORIDE, SODIUM LACTATE, CALCIUM CHLORIDE, MAGNESIUM CHLORIDE AND DEXTROSE 1.5; 538; 448; 18.3; 5.08 G/100ML; MG/100ML; MG/100ML; MG/100ML; MG/100ML
2000 INJECTION, SOLUTION INTRAPERITONEAL EVERY 4 HOURS
Status: DISCONTINUED | OUTPATIENT
Start: 2024-10-12 | End: 2024-10-14 | Stop reason: HOSPADM

## 2024-10-12 RX ADMIN — SODIUM CHLORIDE, SODIUM LACTATE, CALCIUM CHLORIDE, MAGNESIUM CHLORIDE AND DEXTROSE 2000 ML: 1.5; 538; 448; 18.3; 5.08 INJECTION, SOLUTION INTRAPERITONEAL at 17:38

## 2024-10-12 RX ADMIN — SACUBITRIL AND VALSARTAN 1 TABLET: 24; 26 TABLET, FILM COATED ORAL at 19:58

## 2024-10-12 RX ADMIN — HYDROMORPHONE HYDROCHLORIDE 0.5 MG: 1 INJECTION, SOLUTION INTRAMUSCULAR; INTRAVENOUS; SUBCUTANEOUS at 00:50

## 2024-10-12 RX ADMIN — SODIUM CHLORIDE, SODIUM LACTATE, CALCIUM CHLORIDE, MAGNESIUM CHLORIDE AND DEXTROSE 2000 ML: 2.5; 538; 448; 18.3; 5.08 INJECTION, SOLUTION INTRAPERITONEAL at 05:41

## 2024-10-12 RX ADMIN — HEPARIN SODIUM 5000 UNITS: 5000 INJECTION INTRAVENOUS; SUBCUTANEOUS at 23:25

## 2024-10-12 RX ADMIN — INSULIN LISPRO 16 UNITS: 100 INJECTION, SOLUTION INTRAVENOUS; SUBCUTANEOUS at 06:58

## 2024-10-12 RX ADMIN — ISOSORBIDE MONONITRATE 30 MG: 30 TABLET, EXTENDED RELEASE ORAL at 09:33

## 2024-10-12 RX ADMIN — HEPARIN SODIUM 5000 UNITS: 5000 INJECTION INTRAVENOUS; SUBCUTANEOUS at 13:53

## 2024-10-12 RX ADMIN — DIPHENHYDRAMINE HYDROCHLORIDE 25 MG: 50 INJECTION INTRAMUSCULAR; INTRAVENOUS at 18:45

## 2024-10-12 RX ADMIN — SACUBITRIL AND VALSARTAN 1 TABLET: 24; 26 TABLET, FILM COATED ORAL at 09:40

## 2024-10-12 RX ADMIN — SODIUM CHLORIDE, PRESERVATIVE FREE 10 ML: 5 INJECTION INTRAVENOUS at 09:33

## 2024-10-12 RX ADMIN — SODIUM CHLORIDE, SODIUM LACTATE, CALCIUM CHLORIDE, MAGNESIUM CHLORIDE AND DEXTROSE 2000 ML: 1.5; 538; 448; 18.3; 5.08 INJECTION, SOLUTION INTRAPERITONEAL at 21:33

## 2024-10-12 RX ADMIN — HYDROMORPHONE HYDROCHLORIDE 0.5 MG: 1 INJECTION, SOLUTION INTRAMUSCULAR; INTRAVENOUS; SUBCUTANEOUS at 04:57

## 2024-10-12 RX ADMIN — SODIUM CHLORIDE, PRESERVATIVE FREE 10 ML: 5 INJECTION INTRAVENOUS at 19:56

## 2024-10-12 RX ADMIN — INSULIN LISPRO 4 UNITS: 100 INJECTION, SOLUTION INTRAVENOUS; SUBCUTANEOUS at 09:34

## 2024-10-12 RX ADMIN — SODIUM CHLORIDE, PRESERVATIVE FREE 10 ML: 5 INJECTION INTRAVENOUS at 04:57

## 2024-10-12 RX ADMIN — DIPHENHYDRAMINE HYDROCHLORIDE 25 MG: 50 INJECTION INTRAMUSCULAR; INTRAVENOUS at 23:31

## 2024-10-12 RX ADMIN — LEVETIRACETAM 500 MG: 500 TABLET, FILM COATED ORAL at 09:33

## 2024-10-12 RX ADMIN — RANOLAZINE 500 MG: 500 TABLET, EXTENDED RELEASE ORAL at 19:54

## 2024-10-12 RX ADMIN — HYDROMORPHONE HYDROCHLORIDE 0.5 MG: 1 INJECTION, SOLUTION INTRAMUSCULAR; INTRAVENOUS; SUBCUTANEOUS at 19:53

## 2024-10-12 RX ADMIN — CEFEPIME 1000 MG: 1 INJECTION, POWDER, FOR SOLUTION INTRAMUSCULAR; INTRAVENOUS at 09:39

## 2024-10-12 RX ADMIN — HYDROMORPHONE HYDROCHLORIDE 0.5 MG: 1 INJECTION, SOLUTION INTRAMUSCULAR; INTRAVENOUS; SUBCUTANEOUS at 09:42

## 2024-10-12 RX ADMIN — SODIUM CHLORIDE, PRESERVATIVE FREE 10 ML: 5 INJECTION INTRAVENOUS at 00:51

## 2024-10-12 RX ADMIN — SODIUM CHLORIDE, SODIUM LACTATE, CALCIUM CHLORIDE, MAGNESIUM CHLORIDE AND DEXTROSE 2000 ML: 2.5; 538; 448; 18.3; 5.08 INJECTION, SOLUTION INTRAPERITONEAL at 01:36

## 2024-10-12 RX ADMIN — RANOLAZINE 500 MG: 500 TABLET, EXTENDED RELEASE ORAL at 09:39

## 2024-10-12 RX ADMIN — ACETAMINOPHEN 650 MG: 325 TABLET ORAL at 23:31

## 2024-10-12 RX ADMIN — SODIUM CHLORIDE, SODIUM LACTATE, CALCIUM CHLORIDE, MAGNESIUM CHLORIDE AND DEXTROSE 2000 ML: 1.5; 538; 448; 18.3; 5.08 INJECTION, SOLUTION INTRAPERITONEAL at 12:47

## 2024-10-12 RX ADMIN — SERTRALINE 50 MG: 50 TABLET, FILM COATED ORAL at 17:32

## 2024-10-12 RX ADMIN — DIPHENHYDRAMINE HYDROCHLORIDE 25 MG: 50 INJECTION INTRAMUSCULAR; INTRAVENOUS at 09:32

## 2024-10-12 RX ADMIN — CARVEDILOL 12.5 MG: 6.25 TABLET, FILM COATED ORAL at 09:33

## 2024-10-12 RX ADMIN — CARVEDILOL 12.5 MG: 6.25 TABLET, FILM COATED ORAL at 17:32

## 2024-10-12 RX ADMIN — ASPIRIN 81 MG: 81 TABLET, COATED ORAL at 09:33

## 2024-10-12 RX ADMIN — ROSUVASTATIN CALCIUM 10 MG: 10 TABLET, FILM COATED ORAL at 17:32

## 2024-10-12 RX ADMIN — INSULIN GLARGINE 16 UNITS: 100 INJECTION, SOLUTION SUBCUTANEOUS at 06:57

## 2024-10-12 RX ADMIN — CLOPIDOGREL BISULFATE 75 MG: 75 TABLET ORAL at 09:33

## 2024-10-12 RX ADMIN — INSULIN LISPRO 12 UNITS: 100 INJECTION, SOLUTION INTRAVENOUS; SUBCUTANEOUS at 02:44

## 2024-10-12 RX ADMIN — HEPARIN SODIUM 5000 UNITS: 5000 INJECTION INTRAVENOUS; SUBCUTANEOUS at 05:00

## 2024-10-12 ASSESSMENT — PAIN DESCRIPTION - FREQUENCY
FREQUENCY: CONTINUOUS

## 2024-10-12 ASSESSMENT — PAIN SCALES - GENERAL
PAINLEVEL_OUTOF10: 1
PAINLEVEL_OUTOF10: 7
PAINLEVEL_OUTOF10: 10
PAINLEVEL_OUTOF10: 0
PAINLEVEL_OUTOF10: 0
PAINLEVEL_OUTOF10: 2
PAINLEVEL_OUTOF10: 6
PAINLEVEL_OUTOF10: 4
PAINLEVEL_OUTOF10: 7
PAINLEVEL_OUTOF10: 4
PAINLEVEL_OUTOF10: 1

## 2024-10-12 ASSESSMENT — PAIN DESCRIPTION - DESCRIPTORS
DESCRIPTORS: ACHING
DESCRIPTORS: ACHING
DESCRIPTORS: ACHING;TIGHTNESS
DESCRIPTORS: ACHING
DESCRIPTORS: ACHING;TIGHTNESS

## 2024-10-12 ASSESSMENT — PAIN DESCRIPTION - LOCATION
LOCATION: ABDOMEN

## 2024-10-12 ASSESSMENT — PAIN DESCRIPTION - PAIN TYPE
TYPE: ACUTE PAIN

## 2024-10-12 ASSESSMENT — PAIN DESCRIPTION - ONSET
ONSET: ON-GOING

## 2024-10-12 ASSESSMENT — PAIN DESCRIPTION - ORIENTATION
ORIENTATION: UPPER

## 2024-10-12 NOTE — PLAN OF CARE
Problem: Pain  Goal: Verbalizes/displays adequate comfort level or baseline comfort level  Outcome: Progressing     Problem: Hematologic - Adult  Goal: Maintains hematologic stability  Outcome: Progressing      10/11/24 1915 10/11/24 2032   Vitals   Temp 98 °F (36.7 °C)  --    Temp Source Axillary  --    Pulse 75 80   Heart Rate Source Monitor Brachial;Radial   Respirations 14 15   /70 (!) 137/96   MAP (Calculated) 85 110   BP Location Right upper arm Left upper arm   BP Upper/Lower  --  Upper   BP Method Automatic Automatic   Cardiac Rhythm  --  Sinus rhythm with PVC   Pain Assessment   Pain Assessment  --  0-10   Pain Level  --  2   Pain Location  --  Abdomen   Pain Orientation  --  Upper  (\"like a band going across my belly\")   Pain Descriptors  --  Aching   Pain Type  --  Acute pain   Pain Radiating Towards  --  n/a   Pain Frequency  --  Continuous   Pain Onset  --  On-going   Non-Pharmaceutical Pain Intervention(s)  --  Declines   Oxygen Therapy   SpO2 96 % 98 %   Pulse Oximetry Type  --  Intermittent   Pulse Oximeter Device Mode Continuous  --    Pulse Oximeter Device Location  --  Finger   O2 Device  --  None (Room air)

## 2024-10-12 NOTE — FLOWSHEET NOTE
10/12/24 0142 10/12/24 0236   Peritoneal Dialysis (CAPD manual)   Exchange Number 2  --    Dianeal Solution Dextrose 2.5% in 2000 mL  --    Dianeal Additive   (none)  --    Dianeal Antibiotic none  --    Bag Weight (g) 2000 g  --    Peritoneal Input Status  --  Started   Peritoneal Output Status Started Completed  (drainage slowed; bag weighed as 1800 & pt states this happens when his sugar is elevated. [facility];see orders. PD infusion started & next dose time adjusted on MAR)   Effluent Appearance Cloudy;Yellow  (slowly draining;pt states he usuallly drains for 30-45,minutes when doing manual exchanges)  --

## 2024-10-12 NOTE — FLOWSHEET NOTE
Pt's insulin pump needle-set on abd but not in place infusing into abd at this time; pt d/w his loved one to have supplies brought in to pt in AM to restart insulin pump. Will recheck BG overnight

## 2024-10-12 NOTE — PLAN OF CARE
Problem: Pain  Goal: Verbalizes/displays adequate comfort level or baseline comfort level  Outcome: Progressing     Problem: Hematologic - Adult  Goal: Maintains hematologic stability  Outcome: Progressing      10/12/24 0050   Vitals   Temp 98 °F (36.7 °C)   Temp Source Oral   Pulse 89   Heart Rate Source Brachial;Radial   Respirations 20   BP (!) 153/81   MAP (Calculated) 105   BP Location Right upper arm   BP Upper/Lower Upper   BP Method Automatic   Patient Position Semi fowlers   Cardiac Rhythm Sinus rhythm with PVC   Pain Assessment   Pain Assessment 0-10   Pain Level 10   Pain Location Abdomen   Pain Orientation Upper   Pain Descriptors Aching   Pain Type Acute pain   Pain Radiating Towards n/a   Pain Frequency Continuous   Pain Onset On-going   Non-Pharmaceutical Pain Intervention(s) Emotional support  (and gave dilaudid;see MAR)   Oxygen Therapy   SpO2 100 %   Pulse Oximetry Type Intermittent   Pulse Oximeter Device Location Finger   O2 Device None (Room air)

## 2024-10-12 NOTE — PLAN OF CARE
Problem: Hematologic - Adult  Goal: Maintains hematologic stability  Outcome: Progressing      10/12/24 0442   Vitals   Temp 98.9 °F (37.2 °C)   Temp Source Oral   Pulse 89   Heart Rate Source Monitor;Brachial   Respirations 20   /78   MAP (Calculated) 94   BP Location Right upper arm   BP Upper/Lower Upper   BP Method Automatic   Patient Position Sitting   Cardiac Rhythm Sinus rhythm with PVC   Oxygen Therapy   SpO2 97 %   Pulse Oximeter Device Mode Intermittent   Pulse Oximeter Device Location Finger   O2 Device None (Room air)

## 2024-10-12 NOTE — FLOWSHEET NOTE
10/11/24 2130 10/11/24 2147   Peritoneal Dialysis (CAPD manual)   Peritoneal Input Status   (charge RN in to troubleshoot infusion & able to infuse) Completed

## 2024-10-12 NOTE — PROGRESS NOTES
V2.0    [facility] [place] [facility] Progress Note      Name:  Te Garay /Age/Sex: 1965  (59 y.o. male)   MRN & CSN:  [id number] & [id number] Encounter Date/Time: 10/12/2024 12:12 PM EDT   Location:  [facility]3378/3378-01 PCP: Reid Lei MD     Attending:Jayla Rubio MD       Hospital Day: 2    Assessment and Recommendations   Te Garay is a 59 y.o. male with pmh of ESRD on PD, CAD, HFrEF, HTN, seizures, CVA involving the TAMICA, PVD, T1DM presented from home with complaints of abdominal pain, cloudy PD catheter fluid, subjective fever and chills concerning for Peritonitis associated with peritoneal dialysis, initial encounter ([facility])      Plan:     Peritonitis associated with PD:  PD Fluid turbid, >130,000 white cells and 90% neutrophils suggestive for peritonitis.    Follow-up PD fluid cultures.  Blood cultures NGTD.  CT abdomen and pelvis: Cholelithiasis with equivocal cholecystitis.  ID & Nephrology consults appreciated.  Continue IV Cefepime and Vancomycin     CAD s/p PCI:  HFrEF without acute decompensation:  s/p high risk PCI to SVG-OM  on  with Dr. Marin.    TTE 2024: LVEF 35 to 40% with regional wall motion abnormalities.  Continue Ranexa, Coreg, Entresto, Lasix, Crestor, Imdur.  BP borderline low; doses decreased.     Recent acute stroke (2024): Continue aspirin and statin.     Insulin dependent diabetes mellitus 1 with hyperglycemia:   A1c 8.0% 2024.  Insulin pump off.  Continue Basal/prandial Insulin with SSI.     ESRD on PD: Nephrology consulted. Continue CCPD.     Seizure disorder: Continue AEDs.  Seizure precautions.    Left Foot Diabetic Ulcer/Charcot arthropathy  Continue local wound care.  Follow up with Dr. Lundy out patient.    Diet ADULT DIET; Regular; 4 carb choices (60 gm/meal)   DVT Prophylaxis [] Lovenox, [x]  Heparin, [] SCDs, [] Ambulation,  [] Eliquis, [] Xarelto  [] Coumadin   Code Status Full Code   Disposition From: Home  Expected Disposition: Home  Estimated Date of  isosorbide mononitrate  30 mg Oral Daily    levETIRAcetam  500 mg Oral Daily    ranolazine  500 mg Oral BID    rosuvastatin  10 mg Oral QPM    vancomycin (VANCOCIN) intermittent dosing (placeholder)   Other RX Placeholder    cefepime  1,000 mg IntraVENous Q24H    clopidogrel  75 mg Oral Daily    sacubitril-valsartan  1 tablet Oral BID      Infusions:    dextrose       PRN Meds: diphenhydrAMINE, 25 mg, Q6H PRN  sodium chloride flush, 5-40 mL, PRN  ondansetron, 4 mg, Q8H PRN   Or  ondansetron, 4 mg, Q6H PRN  polyethylene glycol, 17 g, Daily PRN  acetaminophen, 650 mg, Q6H PRN   Or  acetaminophen, 650 mg, Q6H PRN  dextrose bolus, 125 mL, PRN   Or  dextrose bolus, 250 mL, PRN  glucagon (rDNA), 1 mg, PRN  dextrose, , Continuous PRN  HYDROmorphone, 0.25 mg, Q4H PRN   Or  HYDROmorphone, 0.5 mg, Q4H PRN        Labs and Imaging   US GALLBLADDER RUQ    Result Date: 10/11/2024  EXAMINATION: RIGHT UPPER QUADRANT ULTRASOUND 10/11/2024 6:36 pm COMPARISON: None. HISTORY: ORDERING SYSTEM PROVIDED HISTORY: Rule out cholecystitis TECHNOLOGIST PROVIDED HISTORY: Reason for exam:->Rule out cholecystitis FINDINGS: LIVER:  The liver demonstrates normal echogenicity without evidence of intrahepatic biliary ductal dilatation. BILIARY SYSTEM: Multiple gallstones.  Negative for sonographic Connor sign. Common bile duct is within normal limits RIGHT KIDNEY: Right kidney it appears to be hyperechoic.  No hydronephrosis. PANCREAS:  Hyperechoic OTHER: No evidence of right upper quadrant ascites.     Cholelithiasis but no sonographic evidence for cholecystitis.     CT ABDOMEN PELVIS WO CONTRAST Additional Contrast? None    Result Date: 10/11/2024  EXAMINATION: CT OF THE ABDOMEN AND PELVIS WITHOUT CONTRAST 10/11/2024 8:49 am TECHNIQUE: CT of the abdomen and pelvis was performed without the administration of intravenous contrast. Multiplanar reformatted images are provided for review. Automated exposure control, iterative reconstruction, and/or

## 2024-10-12 NOTE — PROGRESS NOTES
Nephrology Progress Note  [facility]  [phone]   [employer]    Patient:  Te Garay   : 1965    CC:   ESRD    Subjective:  Patient seen and examined on PD. Net gaining fluid, no UF , this could be related to v high blood sugars. He has not had BM y day. BP okay.       ROS:   Abdominal pain, cloudy effluent clearing     SHx:  No visitors    Meds:  Scheduled Meds:   insulin lispro  0-8 Units SubCUTAneous 4x Daily AC & HS    sodium chloride flush  5-40 mL IntraVENous 2 times per day    heparin (porcine)  5,000 Units SubCUTAneous 3 times per day    aspirin  81 mg Oral Daily    carvedilol  12.5 mg Oral BID WC    [Held by provider] furosemide  40 mg Oral BID    isosorbide mononitrate  30 mg Oral Daily    levETIRAcetam  500 mg Oral Daily    ranolazine  500 mg Oral BID    rosuvastatin  10 mg Oral QPM    vancomycin (VANCOCIN) intermittent dosing (placeholder)   Other RX Placeholder    cefepime  1,000 mg IntraVENous Q24H    clopidogrel  75 mg Oral Daily    sacubitril-valsartan  1 tablet Oral BID    dianeal lo-miller (ULTRABAG) 2.5%  2,000 mL IntraPERitoneal Q4H    Insulin Pump - Bolus Dose   SubCUTAneous 4x Daily AC & HS    Insulin Pump - Basal Dose   SubCUTAneous Daily     Continuous Infusions:   dextrose       PRN Meds:.sodium chloride flush, ondansetron **OR** ondansetron, polyethylene glycol, acetaminophen **OR** acetaminophen, dextrose bolus **OR** dextrose bolus, glucagon (rDNA), dextrose, HYDROmorphone **OR** HYDROmorphone    Vitals:  /78   Pulse 89   Temp 98.9 °F (37.2 °C) (Oral)   Resp 20   Ht 1.727 m (5' 8\")   Wt 86.2 kg (190 lb)   SpO2 97%   BMI 28.89 kg/m²     Physical Exam:  Gen: Resting in bed, NAD.    HEENT: oral mucosa moist  CV: RRR, s1 s2 +  Lungs: good respiratory effort and clear air entry   Abd: S/NT +BS  Ext: No edema, no cyanosis  Skin: Warm.  No rashes appreciated.    Labs:  CBC:   Lab Results   Component Value Date/Time    WBC 3.2 10/12/2024 04:50 AM

## 2024-10-12 NOTE — FLOWSHEET NOTE
10/12/24 0535 10/12/24 0541   Peritoneal Dialysis (CAPD manual)   Exchange Number  --  3   Dianeal Solution  --  Dextrose 2.5% in 2000 mL   Dianeal Additive  --    (none)   Dianeal Antibiotic  --  none   Bag Weight (g)  --  2000 g   Peritoneal Output Status Started  (collected sample 150grams then drain bag connected) Started  (sample had been collected;drain bag connected for \"drain start\" now)     Specimen collected;will send to lab now

## 2024-10-12 NOTE — FLOWSHEET NOTE
10/12/24 0247   Peritoneal Dialysis (CAPD manual)   Peritoneal Input Status Completed   Peritoneal Dialysis Volume In (ml) 2000 ml   Effluent Volume Out (mL) 1800 ml   Balance This Exchange (mL) -200 ml

## 2024-10-12 NOTE — PLAN OF CARE
Problem: Hematologic - Adult  Goal: Maintains hematologic stability  Outcome: Progressing     Problem: Pain  Goal: Verbalizes/displays adequate comfort level or baseline comfort level  Outcome: Progressing      10/12/24 0240   Vitals   Temp 98 °F (36.7 °C)   Temp Source Oral   Pulse 93   Heart Rate Source Brachial;Radial   Respirations 20   /82   MAP (Calculated) 101   BP Location Right upper arm   BP Upper/Lower Upper   BP Method Automatic   Patient Position Sitting   Cardiac Rhythm Sinus rhythm with PVC   Pain Assessment   Pain Assessment 0-10   Pain Level 1   Pain Location Abdomen   Non-Pharmaceutical Pain Intervention(s) Declines   Oxygen Therapy   SpO2 98 %   Pulse Oximeter Device Mode Intermittent   Pulse Oximeter Device Location Finger   O2 Device None (Room air)

## 2024-10-12 NOTE — PLAN OF CARE
Problem: Pain  Goal: Verbalizes/displays adequate comfort level or baseline comfort level  Outcome: Progressing     Problem: Hematologic - Adult  Goal: Maintains hematologic stability  Outcome: Progressing     Change of shift now;gave report to oncoming shift. Pt was asleep in bed, dangle/sitting up at bedside. VS obtained. See all flowsheets.      10/12/24 0745   Vitals   Temp 97.4 °F (36.3 °C)   Temp Source Oral   Pulse 79   Heart Rate Source Monitor   Respirations 20   /70   MAP (Calculated) 90   BP Location Right upper arm   BP Method Automatic   Patient Position Sitting   Cardiac Rhythm Sinus rhythm with PVC   Pain Assessment   Pain Assessment 0-10   Pain Level 4   Pain Location Abdomen   Pain Orientation Upper   Pain Descriptors Aching   Pain Type Acute pain   Pain Radiating Towards n/a   Pain Frequency Continuous   Pain Onset On-going   Non-Pharmaceutical Pain Intervention(s) Declines   Oxygen Therapy   SpO2 99 %   Pulse Oximetry Type Intermittent   Pulse Oximeter Device Mode Intermittent   Pulse Oximeter Device Location Finger   O2 Device None (Room air)

## 2024-10-12 NOTE — FLOWSHEET NOTE
10/12/24 0535 10/12/24 0541 10/12/24 0631   Peritoneal Dialysis (CAPD manual)   Exchange Number  --  3  --    Dianeal Solution  --  Dextrose 2.5% in 2000 mL  --    Dianeal Additive  --    (none)  --    Dianeal Antibiotic  --  none  --    Bag Weight (g)  --  2000 g  --    Peritoneal Input Status  --   --  Started   Peritoneal Output Status Started  (collected sample 150grams then drain bag connected) Started  (sample had been collected;drain bag connected for \"drain start\" now) Completed   Effluent Appearance  --  Cloudy;Yellow  --    Peritoneal Dialysis Volume In (ml)  --   --  2000 ml   Effluent Volume Out (mL)  --   --  1700 ml   Balance This Exchange (mL)  --   --  -300 ml

## 2024-10-12 NOTE — FLOWSHEET NOTE
Messaged hospitalist Miriam Erazo NP via [employer] at 2:28 AM, \"Pt admitted 10/11/24 with peritonitis, is diabetic, and had insulin pump disconnect overnight. Pt's family will bring in new supplies in AM for pt to replace insulin pump, but accucheck 469 at 0211. Pt is agreeable to subcut dose insulin if you decide to order any coverage for at this time. Please advise \"    Message marked as, \"Read 2:35 AM,\" and NP responded to RN at 2:37 AM, \"Order is in thanks\"    12 units insulin given at 0244; see MAR & all flowsheets.

## 2024-10-12 NOTE — PLAN OF CARE
Problem: Pain  Goal: Verbalizes/displays adequate comfort level or baseline comfort level  Outcome: Progressing      10/11/24 9186   Pain Assessment   Pain Assessment 0-10   Pain Level 3   Pain Location Abdomen   Pain Orientation Upper   Pain Descriptors Aching   Pain Type Acute pain   Pain Radiating Towards n/a   Pain Frequency Continuous   Pain Onset On-going   Non-Pharmaceutical Pain Intervention(s) Emotional support  (and giving tylenol now)

## 2024-10-12 NOTE — PLAN OF CARE
Problem: Chronic Conditions and Co-morbidities  Goal: Patient's chronic conditions and co-morbidity symptoms are monitored and maintained or improved  Outcome: Progressing     Problem: Discharge Planning  Goal: Discharge to home or other facility with appropriate resources  Outcome: Progressing     Problem: ABCDS Injury Assessment  Goal: Absence of physical injury  Outcome: Progressing     Problem: Safety - Adult  Goal: Free from fall injury  Outcome: Progressing     Problem: Pain  Goal: Verbalizes/displays adequate comfort level or baseline comfort level  10/12/2024 1440 by Alba Sharp RN  Outcome: Progressing  10/12/2024 0833 by Carol Mann RN  Outcome: Progressing     Problem: Hematologic - Adult  Goal: Maintains hematologic stability  10/12/2024 1440 by Alba Sharp RN  Outcome: Progressing  10/12/2024 0833 by Carol Mann RN  Outcome: Progressing

## 2024-10-12 NOTE — FLOWSHEET NOTE
10/12/24 0647   Peritoneal Dialysis (CAPD manual)   Peritoneal Input Status Completed     Nephrologist at bedside now, all PD flowsheets reviewed with RN, and updated on pt status

## 2024-10-12 NOTE — FLOWSHEET NOTE
10/11/24 2057 10/11/24 2059 10/11/24 2120   Peritoneal Dialysis (CAPD manual)   Exchange Number 1  --   --    Dianeal Solution Dextrose 2.5% in 2000 mL  --   --    Dianeal Additive   (none)  --   --    Dianeal Antibiotic none  --   --    Bag Weight (g) 2000 g  --   --    Peritoneal Input Status  --  Started   (fluid has not infused yet; RN unable to infuse. Charge RN informed and will attempt infusion)   Peritoneal Output Status Started  (started drain now, but pt states that he had already drained ABD prior to coming in to the ED)  --   --

## 2024-10-12 NOTE — FLOWSHEET NOTE
Messaged hospitalist Mee Don, DO via [employer] at 5:07 AM, \"Pt admitted 10/11/24 with peritonitis associated with PD, is diabetic, and had insulin pump disconnect overnight. Pt's family will bring in new supplies in AM for pt to replace insulin pump, but accucheck 469 at 0211. NP had ordered x1 dose subcut insulin, which was given at 0244. Recheck glucose 434 at 0437. Pt is asking for insulin dosing now if appropriate and benadryl for itching, as he takes at home when chronic intermittent itching episodes occur. Please advise\"    Message marked, \"Read 5:07 AM.\" No new orders obtained      RN Messaged hospitalist Dr. Don at 6:38 AM , \"Glucose up to 474 and creatinine 7.7 on AM labs; PD exchanges already in place but no sliding scale insulin ordered\"       Message marked, \"Read 6:38 AM,\" and orders obtained for Lantus 16 units and Humalog 16 units in addition to further diabetic management orders. Insulin given at 0658; see MAR & all flowsheets.

## 2024-10-13 LAB
ALBUMIN SERPL-MCNC: 2.7 G/DL (ref 3.4–5)
ALBUMIN/GLOB SERPL: 0.9 {RATIO} (ref 1.1–2.2)
ALP SERPL-CCNC: 74 U/L (ref 40–129)
ALT SERPL-CCNC: <5 U/L (ref 10–40)
ANION GAP SERPL CALCULATED.3IONS-SCNC: 14 MMOL/L (ref 3–16)
APPEARANCE FLUID: CLEAR
AST SERPL-CCNC: 12 U/L (ref 15–37)
BACTERIA FLD AEROBE CULT: ABNORMAL
BASOPHILS # BLD: 0 K/UL (ref 0–0.2)
BASOPHILS NFR BLD: 0.3 %
BDY FLUID QUALITY: NORMAL
BILIRUB SERPL-MCNC: <0.2 MG/DL (ref 0–1)
BUN SERPL-MCNC: 47 MG/DL (ref 7–20)
CALCIUM SERPL-MCNC: 8.3 MG/DL (ref 8.3–10.6)
CELL COUNT FLUID TYPE: NORMAL
CHLORIDE SERPL-SCNC: 94 MMOL/L (ref 99–110)
CO2 SERPL-SCNC: 23 MMOL/L (ref 21–32)
COLOR FLUID: COLORLESS
CREAT SERPL-MCNC: 6.8 MG/DL (ref 0.9–1.3)
DEPRECATED RDW RBC AUTO: 14.2 % (ref 12.4–15.4)
EOSINOPHIL # BLD: 0.1 K/UL (ref 0–0.6)
EOSINOPHIL NFR BLD: 2.3 %
EST. AVERAGE GLUCOSE BLD GHB EST-MCNC: 188.6 MG/DL
GFR SERPLBLD CREATININE-BSD FMLA CKD-EPI: 9 ML/MIN/{1.73_M2}
GLUCOSE BLD-MCNC: 293 MG/DL (ref 70–99)
GLUCOSE BLD-MCNC: 342 MG/DL (ref 70–99)
GLUCOSE BLD-MCNC: 347 MG/DL (ref 70–99)
GLUCOSE BLD-MCNC: 377 MG/DL (ref 70–99)
GLUCOSE BLD-MCNC: 386 MG/DL (ref 70–99)
GLUCOSE SERPL-MCNC: 418 MG/DL (ref 70–99)
GRAM STN SPEC: ABNORMAL
HBA1C MFR BLD: 8.2 %
HCT VFR BLD AUTO: 35.9 % (ref 40.5–52.5)
HGB BLD-MCNC: 11.5 G/DL (ref 13.5–17.5)
LYMPHOCYTES # BLD: 1 K/UL (ref 1–5.1)
LYMPHOCYTES NFR BLD: 24.7 %
LYMPHOCYTES NFR FLD: 18 %
MACROPHAGES # FLD: 22 %
MCH RBC QN AUTO: 28.4 PG (ref 26–34)
MCHC RBC AUTO-ENTMCNC: 32.1 G/DL (ref 31–36)
MCV RBC AUTO: 88.4 FL (ref 80–100)
MONOCYTES # BLD: 0.4 K/UL (ref 0–1.3)
MONOCYTES NFR BLD: 10.6 %
NEUTROPHIL, FLUID: 59 %
NEUTROPHILS # BLD: 2.5 K/UL (ref 1.7–7.7)
NEUTROPHILS NFR BLD: 62.1 %
NUC CELL # FLD: 47 /CUMM
ORGANISM: ABNORMAL
PATH REV: NO
PERFORMED ON: ABNORMAL
PLASMA CELLS FLUID: 1 %
PLATELET # BLD AUTO: 185 K/UL (ref 135–450)
PMV BLD AUTO: 10.2 FL (ref 5–10.5)
POTASSIUM SERPL-SCNC: 4 MMOL/L (ref 3.5–5.1)
PROT SERPL-MCNC: 5.8 G/DL (ref 6.4–8.2)
RBC # BLD AUTO: 4.06 M/UL (ref 4.2–5.9)
RBC FLUID: <1000 /CUMM
SODIUM SERPL-SCNC: 131 MMOL/L (ref 136–145)
TOTAL CELLS COUNTED FLD: 100
VANCOMYCIN SERPL-MCNC: 11.9 UG/ML
WBC # BLD AUTO: 4 K/UL (ref 4–11)

## 2024-10-13 PROCEDURE — 6360000002 HC RX W HCPCS: Performed by: INTERNAL MEDICINE

## 2024-10-13 PROCEDURE — 2060000000 HC ICU INTERMEDIATE R&B

## 2024-10-13 PROCEDURE — 2580000003 HC RX 258: Performed by: STUDENT IN AN ORGANIZED HEALTH CARE EDUCATION/TRAINING PROGRAM

## 2024-10-13 PROCEDURE — 36415 COLL VENOUS BLD VENIPUNCTURE: CPT

## 2024-10-13 PROCEDURE — 89051 BODY FLUID CELL COUNT: CPT

## 2024-10-13 PROCEDURE — 80202 ASSAY OF VANCOMYCIN: CPT

## 2024-10-13 PROCEDURE — 80053 COMPREHEN METABOLIC PANEL: CPT

## 2024-10-13 PROCEDURE — 6370000000 HC RX 637 (ALT 250 FOR IP): Performed by: INTERNAL MEDICINE

## 2024-10-13 PROCEDURE — 6370000000 HC RX 637 (ALT 250 FOR IP): Performed by: STUDENT IN AN ORGANIZED HEALTH CARE EDUCATION/TRAINING PROGRAM

## 2024-10-13 PROCEDURE — 6360000002 HC RX W HCPCS: Performed by: STUDENT IN AN ORGANIZED HEALTH CARE EDUCATION/TRAINING PROGRAM

## 2024-10-13 PROCEDURE — 85025 COMPLETE CBC W/AUTO DIFF WBC: CPT

## 2024-10-13 PROCEDURE — A4722 DIALYS SOL FLD VOL > 1999CC: HCPCS | Performed by: INTERNAL MEDICINE

## 2024-10-13 PROCEDURE — 83036 HEMOGLOBIN GLYCOSYLATED A1C: CPT

## 2024-10-13 PROCEDURE — 2580000003 HC RX 258: Performed by: INTERNAL MEDICINE

## 2024-10-13 RX ORDER — TORSEMIDE 20 MG/1
50 TABLET ORAL DAILY
Status: DISCONTINUED | OUTPATIENT
Start: 2024-10-13 | End: 2024-10-14 | Stop reason: HOSPADM

## 2024-10-13 RX ORDER — DEXTROSE MONOHYDRATE 100 MG/ML
INJECTION, SOLUTION INTRAVENOUS CONTINUOUS PRN
Status: DISCONTINUED | OUTPATIENT
Start: 2024-10-13 | End: 2024-10-14 | Stop reason: HOSPADM

## 2024-10-13 RX ORDER — ACYCLOVIR 400 MG/1
1 TABLET ORAL ONCE
Qty: 1 EACH | Refills: 1 | Status: SHIPPED | OUTPATIENT
Start: 2024-10-13 | End: 2024-10-15

## 2024-10-13 RX ORDER — ACYCLOVIR 400 MG/1
1 TABLET ORAL ONCE
Qty: 1 EACH | Refills: 2 | Status: SHIPPED | OUTPATIENT
Start: 2024-10-13 | End: 2024-10-15

## 2024-10-13 RX ORDER — GLUCAGON 1 MG/ML
1 KIT INJECTION PRN
Status: DISCONTINUED | OUTPATIENT
Start: 2024-10-13 | End: 2024-10-14 | Stop reason: HOSPADM

## 2024-10-13 RX ADMIN — SODIUM CHLORIDE, SODIUM LACTATE, CALCIUM CHLORIDE, MAGNESIUM CHLORIDE AND DEXTROSE 2000 ML: 1.5; 538; 448; 18.3; 5.08 INJECTION, SOLUTION INTRAPERITONEAL at 18:48

## 2024-10-13 RX ADMIN — SODIUM CHLORIDE, SODIUM LACTATE, CALCIUM CHLORIDE, MAGNESIUM CHLORIDE AND DEXTROSE 2000 ML: 1.5; 538; 448; 18.3; 5.08 INJECTION, SOLUTION INTRAPERITONEAL at 13:59

## 2024-10-13 RX ADMIN — SODIUM CHLORIDE, SODIUM LACTATE, CALCIUM CHLORIDE, MAGNESIUM CHLORIDE AND DEXTROSE 2000 ML: 1.5; 538; 448; 18.3; 5.08 INJECTION, SOLUTION INTRAPERITONEAL at 09:13

## 2024-10-13 RX ADMIN — HEPARIN SODIUM 5000 UNITS: 5000 INJECTION INTRAVENOUS; SUBCUTANEOUS at 14:03

## 2024-10-13 RX ADMIN — ASPIRIN 81 MG: 81 TABLET, COATED ORAL at 09:13

## 2024-10-13 RX ADMIN — SACUBITRIL AND VALSARTAN 1 TABLET: 24; 26 TABLET, FILM COATED ORAL at 09:20

## 2024-10-13 RX ADMIN — RANOLAZINE 500 MG: 500 TABLET, EXTENDED RELEASE ORAL at 09:13

## 2024-10-13 RX ADMIN — SODIUM CHLORIDE, SODIUM LACTATE, CALCIUM CHLORIDE, MAGNESIUM CHLORIDE AND DEXTROSE 2000 ML: 1.5; 538; 448; 18.3; 5.08 INJECTION, SOLUTION INTRAPERITONEAL at 01:36

## 2024-10-13 RX ADMIN — SODIUM CHLORIDE 1250 MG: 9 INJECTION, SOLUTION INTRAVENOUS at 06:14

## 2024-10-13 RX ADMIN — HEPARIN SODIUM 5000 UNITS: 5000 INJECTION INTRAVENOUS; SUBCUTANEOUS at 05:01

## 2024-10-13 RX ADMIN — CARVEDILOL 12.5 MG: 6.25 TABLET, FILM COATED ORAL at 09:13

## 2024-10-13 RX ADMIN — HYDROMORPHONE HYDROCHLORIDE 0.5 MG: 1 INJECTION, SOLUTION INTRAMUSCULAR; INTRAVENOUS; SUBCUTANEOUS at 04:57

## 2024-10-13 RX ADMIN — ACETAMINOPHEN 650 MG: 325 TABLET ORAL at 06:30

## 2024-10-13 RX ADMIN — SODIUM CHLORIDE, PRESERVATIVE FREE 10 ML: 5 INJECTION INTRAVENOUS at 21:36

## 2024-10-13 RX ADMIN — SERTRALINE 50 MG: 50 TABLET, FILM COATED ORAL at 09:13

## 2024-10-13 RX ADMIN — DIPHENHYDRAMINE HYDROCHLORIDE 25 MG: 50 INJECTION INTRAMUSCULAR; INTRAVENOUS at 23:49

## 2024-10-13 RX ADMIN — ROSUVASTATIN CALCIUM 10 MG: 10 TABLET, FILM COATED ORAL at 17:01

## 2024-10-13 RX ADMIN — TORSEMIDE 50 MG: 20 TABLET ORAL at 18:56

## 2024-10-13 RX ADMIN — HEPARIN SODIUM 5000 UNITS: 5000 INJECTION INTRAVENOUS; SUBCUTANEOUS at 21:35

## 2024-10-13 RX ADMIN — CEFEPIME 1000 MG: 1 INJECTION, POWDER, FOR SOLUTION INTRAMUSCULAR; INTRAVENOUS at 09:12

## 2024-10-13 RX ADMIN — SODIUM CHLORIDE, PRESERVATIVE FREE 10 ML: 5 INJECTION INTRAVENOUS at 09:13

## 2024-10-13 RX ADMIN — HYDROMORPHONE HYDROCHLORIDE 0.5 MG: 1 INJECTION, SOLUTION INTRAMUSCULAR; INTRAVENOUS; SUBCUTANEOUS at 23:49

## 2024-10-13 RX ADMIN — CARVEDILOL 12.5 MG: 6.25 TABLET, FILM COATED ORAL at 17:01

## 2024-10-13 RX ADMIN — HYDROMORPHONE HYDROCHLORIDE 0.25 MG: 1 INJECTION, SOLUTION INTRAMUSCULAR; INTRAVENOUS; SUBCUTANEOUS at 18:55

## 2024-10-13 RX ADMIN — ISOSORBIDE MONONITRATE 30 MG: 30 TABLET, EXTENDED RELEASE ORAL at 09:13

## 2024-10-13 RX ADMIN — CLOPIDOGREL BISULFATE 75 MG: 75 TABLET ORAL at 09:12

## 2024-10-13 RX ADMIN — RANOLAZINE 500 MG: 500 TABLET, EXTENDED RELEASE ORAL at 21:35

## 2024-10-13 RX ADMIN — SODIUM CHLORIDE, SODIUM LACTATE, CALCIUM CHLORIDE, MAGNESIUM CHLORIDE AND DEXTROSE 2000 ML: 1.5; 538; 448; 18.3; 5.08 INJECTION, SOLUTION INTRAPERITONEAL at 05:31

## 2024-10-13 RX ADMIN — SODIUM CHLORIDE, SODIUM LACTATE, CALCIUM CHLORIDE, MAGNESIUM CHLORIDE AND DEXTROSE 2000 ML: 1.5; 538; 448; 18.3; 5.08 INJECTION, SOLUTION INTRAPERITONEAL at 21:44

## 2024-10-13 RX ADMIN — LEVETIRACETAM 500 MG: 500 TABLET, FILM COATED ORAL at 09:13

## 2024-10-13 RX ADMIN — SACUBITRIL AND VALSARTAN 1 TABLET: 24; 26 TABLET, FILM COATED ORAL at 21:35

## 2024-10-13 ASSESSMENT — PAIN DESCRIPTION - LOCATION
LOCATION: ABDOMEN
LOCATION: ABDOMEN;SHOULDER
LOCATION: ABDOMEN
LOCATION: SHOULDER

## 2024-10-13 ASSESSMENT — PAIN SCALES - GENERAL
PAINLEVEL_OUTOF10: 0
PAINLEVEL_OUTOF10: 0
PAINLEVEL_OUTOF10: 6
PAINLEVEL_OUTOF10: 6
PAINLEVEL_OUTOF10: 3
PAINLEVEL_OUTOF10: 0
PAINLEVEL_OUTOF10: 8
PAINLEVEL_OUTOF10: 0
PAINLEVEL_OUTOF10: 0

## 2024-10-13 ASSESSMENT — PAIN DESCRIPTION - ORIENTATION
ORIENTATION: RIGHT
ORIENTATION: LEFT;RIGHT

## 2024-10-13 ASSESSMENT — PAIN DESCRIPTION - DESCRIPTORS
DESCRIPTORS: ACHING;TIGHTNESS
DESCRIPTORS: ACHING

## 2024-10-13 NOTE — PROGRESS NOTES
Nephrology Progress Note  [facility]  [phone]   [employer]    Patient:  Te Garay   : 1965    CC:   ESRD    Subjective:  Patient seen and examined this am .   Feels good.   Blood sugars elevated.   Abdominal pain is better  Back on insulin pump    SHx:  No visitors    Meds:  Scheduled Meds:   Insulin Pump - Bolus Dose   SubCUTAneous 4x Daily AC & HS    Insulin Pump - Basal Dose   SubCUTAneous Daily    dianeal lo-miller 1.5%  2,000 mL IntraPERitoneal Q4H    sertraline  50 mg Oral Daily    sodium chloride flush  5-40 mL IntraVENous 2 times per day    heparin (porcine)  5,000 Units SubCUTAneous 3 times per day    aspirin  81 mg Oral Daily    carvedilol  12.5 mg Oral BID WC    [Held by provider] furosemide  40 mg Oral BID    isosorbide mononitrate  30 mg Oral Daily    levETIRAcetam  500 mg Oral Daily    ranolazine  500 mg Oral BID    rosuvastatin  10 mg Oral QPM    vancomycin (VANCOCIN) intermittent dosing (placeholder)   Other RX Placeholder    cefepime  1,000 mg IntraVENous Q24H    clopidogrel  75 mg Oral Daily    sacubitril-valsartan  1 tablet Oral BID     Continuous Infusions:   dextrose       PRN Meds:.dextrose bolus **OR** dextrose bolus, glucagon (rDNA), dextrose, diphenhydrAMINE, sodium chloride flush, ondansetron **OR** ondansetron, polyethylene glycol, acetaminophen **OR** acetaminophen, HYDROmorphone **OR** HYDROmorphone    Vitals:  BP (!) 157/76   Pulse 65   Temp 97.7 °F (36.5 °C) (Oral)   Resp 16   Ht 1.727 m (5' 8\")   Wt 87.4 kg (192 lb 9.6 oz) Comment: drained  SpO2 99%   BMI 29.28 kg/m²     Physical Exam:  Gen: Resting in bed, NAD.    HEENT: oral mucosa moist  CV: RRR, s1 s2 +  Lungs: good respiratory effort and clear air entry   Abd: S/NT +BS , PD catheter +under dressing   Ext: No edema, no cyanosis  Skin: Warm.  No rashes appreciated.    Labs:  CBC:   Lab Results   Component Value Date/Time    WBC 4.0 10/13/2024 04:53 AM    RBC 4.06 10/13/2024 04:53 AM

## 2024-10-13 NOTE — PROGRESS NOTES
V2.0    [facility] Progress Note      Name:  Te Garay /Age/Sex: 1965  (59 y.o. male)   MRN & CSN:  [id number] & [id number] Encounter Date/Time: 10/13/2024 12:12 PM EDT   Location:  [facility]3378/3378-01 PCP: Reid Lei MD     Attending:Jayla Rubio MD       Hospital Day: 3    Assessment and Recommendations   Te Garay is a 59 y.o. male with pmh of ESRD on PD, CAD, HFrEF, HTN, seizures, CVA involving the TAMICA, PVD, T1DM presented from home with complaints of abdominal pain, cloudy PD catheter fluid, subjective fever and chills concerning for Peritonitis associated with peritoneal dialysis, initial encounter ([facility])      Plan:     Peritonitis associated with PD:  PD Fluid turbid, >130,000 white cells and 90% neutrophils suggestive for peritonitis.    Follow-up PD fluid cultures.  Blood cultures NGTD.  CT abdomen and pelvis: Cholelithiasis with equivocal cholecystitis.  ID & Nephrology consults appreciated.  Continue IV Cefepime and Vancomycin     CAD s/p PCI:  HFrEF without acute decompensation:  s/p high risk PCI to SVG-OM  on  with Dr. Marin.    TTE 2024: LVEF 35 to 40% with regional wall motion abnormalities.  Continue Ranexa, Coreg, Entresto, Lasix, Crestor, Imdur.  BP borderline low; doses decreased.     Recent acute stroke (2024): Continue aspirin and statin.     Insulin dependent diabetes mellitus 1 with hyperglycemia:   A1c 8.0% 2024.  Patient will continue using insulin pump and Dexcom.     ESRD on PD: Nephrology consulted. Continue CCPD.     Seizure disorder: Continue AEDs.  Seizure precautions.    Left Foot Diabetic Ulcer/Charcot arthropathy  Continue local wound care.  Follow up with Dr. Lundy out patient.    Diet ADULT DIET; Regular; 4 carb choices (60 gm/meal)   DVT Prophylaxis [] Lovenox, [x]  Heparin, [] SCDs, [] Ambulation,  [] Eliquis, [] Xarelto  [] Coumadin   Code Status Full Code   Disposition From: Home  Expected Disposition: Home  Estimated Date of Discharge:

## 2024-10-13 NOTE — PROGRESS NOTES
Clinical Pharmacy Note: Pharmacy to Dose Vancomycin    Vancomycin Day: 3  Indication: Peritonitis  Current Dose: Pulse dosing  Dosing Method: Dosing by random levels    Random: 11.9    Recent Labs     10/12/24  0450 10/13/24  0453   BUN 53* 47*       Recent Labs     10/12/24  0450 10/13/24  0453   CREATININE 7.7* 6.8*       Recent Labs     10/12/24  0450 10/13/24  0453   WBC 3.2* 4.0         Intake/Output Summary (Last 24 hours) at 10/13/2024 0558  Last data filed at 10/13/2024 0158  Gross per 24 hour   Intake 97623 ml   Output 8900 ml   Net 3300 ml         Ht Readings from Last 1 Encounters:   10/11/24 1.727 m (5' 8\")        Wt Readings from Last 1 Encounters:   10/13/24 88.5 kg (195 lb 3.2 oz)         Body mass index is 29.68 kg/m².    Estimated Creatinine Clearance: 13 mL/min (A) (based on SCr of 6.8 mg/dL (HH)).      Assessment/Plan:  Vancomycin level is therapeutic.  Will redose vancomycin 1250mg one time today.   A vancomycin random level has been ordered on 10/15 at 0600 for follow-up.  Changes in regimen will be determined based on culture results, renal function, and clinical response.  Pharmacy will continue to monitor and adjust regimen as necessary.    Thank you for the consult,    Sejal Barlow [facility]  [id number]

## 2024-10-13 NOTE — PLAN OF CARE
Problem: Chronic Conditions and Co-morbidities  Goal: Patient's chronic conditions and co-morbidity symptoms are monitored and maintained or improved  10/12/2024 2146 by Maggy Delgado RN  Outcome: Progressing  Flowsheets (Taken 10/12/2024 1944)  Care Plan - Patient's Chronic Conditions and Co-Morbidity Symptoms are Monitored and Maintained or Improved: Monitor and assess patient's chronic conditions and comorbid symptoms for stability, deterioration, or improvement  10/12/2024 1440 by Alba Sharp RN  Outcome: Progressing     Problem: Discharge Planning  Goal: Discharge to home or other facility with appropriate resources  10/12/2024 2146 by Maggy Delgado RN  Outcome: Progressing  Flowsheets (Taken 10/12/2024 1944)  Discharge to home or other facility with appropriate resources: Identify barriers to discharge with patient and caregiver  10/12/2024 1440 by Alba Sharp RN  Outcome: Progressing     Problem: ABCDS Injury Assessment  Goal: Absence of physical injury  10/12/2024 2146 by Maggy Delgado RN  Outcome: Progressing  10/12/2024 1440 by Alba Sharp RN  Outcome: Progressing     Problem: Safety - Adult  Goal: Free from fall injury  10/12/2024 2146 by Maggy Delgado RN  Outcome: Progressing  10/12/2024 1440 by Alba Shapr RN  Outcome: Progressing     Problem: Pain  Goal: Verbalizes/displays adequate comfort level or baseline comfort level  10/12/2024 2146 by Maggy Delgado RN  Outcome: Progressing  10/12/2024 1440 by Alba Sharp RN  Outcome: Progressing  10/12/2024 0833 by Carol Mann RN  Outcome: Progressing     Problem: Hematologic - Adult  Goal: Maintains hematologic stability  10/12/2024 2146 by Maggy Delgado RN  Outcome: Progressing  Flowsheets (Taken 10/12/2024 1944)  Maintains hematologic stability: Assess for signs and symptoms of bleeding or hemorrhage  10/12/2024 1440 by Alba Sharp RN  Outcome: Progressing  10/12/2024 0833 by Mackenzie  LAURA Medina  Outcome: Progressing

## 2024-10-13 NOTE — PLAN OF CARE
Problem: Chronic Conditions and Co-morbidities  Goal: Patient's chronic conditions and co-morbidity symptoms are monitored and maintained or improved  10/13/2024 1120 by Alba Sharp RN  Outcome: Progressing  10/12/2024 2146 by Maggy Delgado RN  Outcome: Progressing  Flowsheets (Taken 10/12/2024 1944)  Care Plan - Patient's Chronic Conditions and Co-Morbidity Symptoms are Monitored and Maintained or Improved: Monitor and assess patient's chronic conditions and comorbid symptoms for stability, deterioration, or improvement     Problem: Discharge Planning  Goal: Discharge to home or other facility with appropriate resources  10/13/2024 1120 by Alba Sharp RN  Outcome: Progressing  10/12/2024 2146 by Maggy Delgado RN  Outcome: Progressing  Flowsheets (Taken 10/12/2024 1944)  Discharge to home or other facility with appropriate resources: Identify barriers to discharge with patient and caregiver     Problem: ABCDS Injury Assessment  Goal: Absence of physical injury  10/13/2024 1120 by Alba Sharp RN  Outcome: Progressing  10/12/2024 2146 by Maggy Delgado RN  Outcome: Progressing     Problem: Safety - Adult  Goal: Free from fall injury  10/13/2024 1120 by Alba Sharp RN  Outcome: Progressing  10/12/2024 2146 by Maggy Delgado RN  Outcome: Progressing     Problem: Pain  Goal: Verbalizes/displays adequate comfort level or baseline comfort level  10/13/2024 1120 by Alba Sharp RN  Outcome: Progressing  10/12/2024 2146 by Maggy Delgado RN  Outcome: Progressing     Problem: Hematologic - Adult  Goal: Maintains hematologic stability  10/13/2024 1120 by Alba Sharp RN  Outcome: Progressing  10/12/2024 2146 by Maggy Delgado RN  Outcome: Progressing  Flowsheets (Taken 10/12/2024 1944)  Maintains hematologic stability: Assess for signs and symptoms of bleeding or hemorrhage

## 2024-10-14 VITALS
BODY MASS INDEX: 29.64 KG/M2 | RESPIRATION RATE: 16 BRPM | HEART RATE: 74 BPM | DIASTOLIC BLOOD PRESSURE: 68 MMHG | SYSTOLIC BLOOD PRESSURE: 140 MMHG | HEIGHT: 68 IN | WEIGHT: 195.6 LBS | TEMPERATURE: 97.8 F | OXYGEN SATURATION: 98 %

## 2024-10-14 PROBLEM — Z71.89 DIABETES EDUCATION, ENCOUNTER FOR: Status: ACTIVE | Noted: 2024-10-14

## 2024-10-14 LAB
ALBUMIN SERPL-MCNC: 2.6 G/DL (ref 3.4–5)
ALBUMIN/GLOB SERPL: 1 {RATIO} (ref 1.1–2.2)
ALP SERPL-CCNC: 70 U/L (ref 40–129)
ALT SERPL-CCNC: <5 U/L (ref 10–40)
ANION GAP SERPL CALCULATED.3IONS-SCNC: 11 MMOL/L (ref 3–16)
APPEARANCE FLUID: CLEAR
AST SERPL-CCNC: 9 U/L (ref 15–37)
BASOPHILS # BLD: 0 K/UL (ref 0–0.2)
BASOPHILS NFR BLD: 0.3 %
BDY FLUID QUALITY: NORMAL
BILIRUB SERPL-MCNC: <0.2 MG/DL (ref 0–1)
BUN SERPL-MCNC: 39 MG/DL (ref 7–20)
CALCIUM SERPL-MCNC: 8.1 MG/DL (ref 8.3–10.6)
CELL COUNT FLUID TYPE: NORMAL
CHLORIDE SERPL-SCNC: 97 MMOL/L (ref 99–110)
CO2 SERPL-SCNC: 25 MMOL/L (ref 21–32)
COLOR FLUID: COLORLESS
CREAT SERPL-MCNC: 6.3 MG/DL (ref 0.9–1.3)
DEPRECATED RDW RBC AUTO: 14 % (ref 12.4–15.4)
EOSINOPHIL # BLD: 0.1 K/UL (ref 0–0.6)
EOSINOPHIL NFR BLD: 2.9 %
EST. AVERAGE GLUCOSE BLD GHB EST-MCNC: 194.4 MG/DL
EST. AVERAGE GLUCOSE BLD GHB EST-MCNC: 200.1 MG/DL
FERRITIN SERPL IA-MCNC: 114 NG/ML (ref 30–400)
FOLATE SERPL-MCNC: 3.2 NG/ML (ref 4.78–24.2)
GFR SERPLBLD CREATININE-BSD FMLA CKD-EPI: 10 ML/MIN/{1.73_M2}
GLUCOSE BLD-MCNC: 162 MG/DL (ref 70–99)
GLUCOSE BLD-MCNC: 289 MG/DL (ref 70–99)
GLUCOSE BLD-MCNC: 303 MG/DL (ref 70–99)
GLUCOSE SERPL-MCNC: 225 MG/DL (ref 70–99)
HBA1C MFR BLD: 8.4 %
HBA1C MFR BLD: 8.6 %
HCT VFR BLD AUTO: 34.3 % (ref 40.5–52.5)
HGB BLD-MCNC: 11.1 G/DL (ref 13.5–17.5)
IRON SATN MFR SERPL: 29 % (ref 20–50)
IRON SERPL-MCNC: 54 UG/DL (ref 59–158)
LYMPHOCYTES # BLD: 1 K/UL (ref 1–5.1)
LYMPHOCYTES NFR BLD: 25.2 %
LYMPHOCYTES NFR FLD: 17 %
MACROPHAGES # FLD: 31 %
MAGNESIUM SERPL-MCNC: 1.45 MG/DL (ref 1.8–2.4)
MCH RBC QN AUTO: 28.3 PG (ref 26–34)
MCHC RBC AUTO-ENTMCNC: 32.3 G/DL (ref 31–36)
MCV RBC AUTO: 87.7 FL (ref 80–100)
MONOCYTES # BLD: 0.4 K/UL (ref 0–1.3)
MONOCYTES NFR BLD: 11.3 %
MONOCYTES NFR FLD: 0 %
NEUTROPHIL, FLUID: 52 %
NEUTROPHILS # BLD: 2.4 K/UL (ref 1.7–7.7)
NEUTROPHILS NFR BLD: 60.3 %
NUC CELL # FLD: 51 /CUMM
PERFORMED ON: ABNORMAL
PLATELET # BLD AUTO: 192 K/UL (ref 135–450)
PMV BLD AUTO: 9.7 FL (ref 5–10.5)
POTASSIUM SERPL-SCNC: 3.2 MMOL/L (ref 3.5–5.1)
PROT SERPL-MCNC: 5.3 G/DL (ref 6.4–8.2)
RBC # BLD AUTO: 3.91 M/UL (ref 4.2–5.9)
RBC FLUID: <1000 /CUMM
SODIUM SERPL-SCNC: 133 MMOL/L (ref 136–145)
TIBC SERPL-MCNC: 184 UG/DL (ref 260–445)
TOTAL CELLS COUNTED FLD: 100
VIT B12 SERPL-MCNC: 562 PG/ML (ref 211–911)
WBC # BLD AUTO: 3.9 K/UL (ref 4–11)

## 2024-10-14 PROCEDURE — 2580000003 HC RX 258: Performed by: INTERNAL MEDICINE

## 2024-10-14 PROCEDURE — 83036 HEMOGLOBIN GLYCOSYLATED A1C: CPT

## 2024-10-14 PROCEDURE — 89051 BODY FLUID CELL COUNT: CPT

## 2024-10-14 PROCEDURE — 80053 COMPREHEN METABOLIC PANEL: CPT

## 2024-10-14 PROCEDURE — 82607 VITAMIN B-12: CPT

## 2024-10-14 PROCEDURE — A4722 DIALYS SOL FLD VOL > 1999CC: HCPCS | Performed by: INTERNAL MEDICINE

## 2024-10-14 PROCEDURE — 82746 ASSAY OF FOLIC ACID SERUM: CPT

## 2024-10-14 PROCEDURE — 6360000002 HC RX W HCPCS: Performed by: INTERNAL MEDICINE

## 2024-10-14 PROCEDURE — 99233 SBSQ HOSP IP/OBS HIGH 50: CPT | Performed by: INTERNAL MEDICINE

## 2024-10-14 PROCEDURE — 82728 ASSAY OF FERRITIN: CPT

## 2024-10-14 PROCEDURE — 83735 ASSAY OF MAGNESIUM: CPT

## 2024-10-14 PROCEDURE — 2580000003 HC RX 258: Performed by: STUDENT IN AN ORGANIZED HEALTH CARE EDUCATION/TRAINING PROGRAM

## 2024-10-14 PROCEDURE — 6370000000 HC RX 637 (ALT 250 FOR IP): Performed by: INTERNAL MEDICINE

## 2024-10-14 PROCEDURE — 6360000002 HC RX W HCPCS: Performed by: STUDENT IN AN ORGANIZED HEALTH CARE EDUCATION/TRAINING PROGRAM

## 2024-10-14 PROCEDURE — 83550 IRON BINDING TEST: CPT

## 2024-10-14 PROCEDURE — 83540 ASSAY OF IRON: CPT

## 2024-10-14 PROCEDURE — 85025 COMPLETE CBC W/AUTO DIFF WBC: CPT

## 2024-10-14 PROCEDURE — 36415 COLL VENOUS BLD VENIPUNCTURE: CPT

## 2024-10-14 RX ORDER — OXYCODONE AND ACETAMINOPHEN 5; 325 MG/1; MG/1
1 TABLET ORAL EVERY 6 HOURS PRN
Qty: 12 TABLET | Refills: 0 | Status: SHIPPED | OUTPATIENT
Start: 2024-10-14 | End: 2024-10-17

## 2024-10-14 RX ORDER — FLUCONAZOLE 100 MG/1
100 TABLET ORAL DAILY
Qty: 30 TABLET | Refills: 0 | Status: SHIPPED | OUTPATIENT
Start: 2024-10-14 | End: 2024-11-13

## 2024-10-14 RX ORDER — CARVEDILOL 12.5 MG/1
25 TABLET ORAL 2 TIMES DAILY WITH MEALS
Qty: 30 TABLET | Refills: 0
Start: 2024-10-14

## 2024-10-14 RX ORDER — CARVEDILOL 25 MG/1
25 TABLET ORAL 2 TIMES DAILY WITH MEALS
Status: DISCONTINUED | OUTPATIENT
Start: 2024-10-14 | End: 2024-10-14 | Stop reason: HOSPADM

## 2024-10-14 RX ORDER — POTASSIUM CHLORIDE 1500 MG/1
40 TABLET, EXTENDED RELEASE ORAL ONCE
Status: COMPLETED | OUTPATIENT
Start: 2024-10-14 | End: 2024-10-14

## 2024-10-14 RX ORDER — FLUCONAZOLE 100 MG/1
100 TABLET ORAL DAILY
Status: DISCONTINUED | OUTPATIENT
Start: 2024-10-14 | End: 2024-10-14 | Stop reason: HOSPADM

## 2024-10-14 RX ORDER — POTASSIUM CHLORIDE 1500 MG/1
20 TABLET, EXTENDED RELEASE ORAL DAILY
Qty: 7 TABLET | Refills: 0 | Status: SHIPPED | OUTPATIENT
Start: 2024-10-14 | End: 2024-10-21

## 2024-10-14 RX ORDER — SACUBITRIL AND VALSARTAN 49; 51 MG/1; MG/1
1 TABLET, FILM COATED ORAL 2 TIMES DAILY
Qty: 180 TABLET | OUTPATIENT
Start: 2024-10-14

## 2024-10-14 RX ORDER — MAGNESIUM SULFATE IN WATER 40 MG/ML
2000 INJECTION, SOLUTION INTRAVENOUS ONCE
Status: COMPLETED | OUTPATIENT
Start: 2024-10-14 | End: 2024-10-14

## 2024-10-14 RX ADMIN — RANOLAZINE 500 MG: 500 TABLET, EXTENDED RELEASE ORAL at 08:51

## 2024-10-14 RX ADMIN — CARVEDILOL 12.5 MG: 6.25 TABLET, FILM COATED ORAL at 08:51

## 2024-10-14 RX ADMIN — MAGNESIUM SULFATE HEPTAHYDRATE 2000 MG: 40 INJECTION, SOLUTION INTRAVENOUS at 08:59

## 2024-10-14 RX ADMIN — ASPIRIN 81 MG: 81 TABLET, COATED ORAL at 08:51

## 2024-10-14 RX ADMIN — FLUCONAZOLE 100 MG: 100 TABLET ORAL at 15:39

## 2024-10-14 RX ADMIN — LEVETIRACETAM 500 MG: 500 TABLET, FILM COATED ORAL at 08:51

## 2024-10-14 RX ADMIN — TORSEMIDE 50 MG: 20 TABLET ORAL at 08:51

## 2024-10-14 RX ADMIN — SODIUM CHLORIDE, SODIUM LACTATE, CALCIUM CHLORIDE, MAGNESIUM CHLORIDE AND DEXTROSE 2000 ML: 1.5; 538; 448; 18.3; 5.08 INJECTION, SOLUTION INTRAPERITONEAL at 09:20

## 2024-10-14 RX ADMIN — HEPARIN SODIUM 5000 UNITS: 5000 INJECTION INTRAVENOUS; SUBCUTANEOUS at 05:39

## 2024-10-14 RX ADMIN — SODIUM CHLORIDE, PRESERVATIVE FREE 10 ML: 5 INJECTION INTRAVENOUS at 08:59

## 2024-10-14 RX ADMIN — SERTRALINE 50 MG: 50 TABLET, FILM COATED ORAL at 08:51

## 2024-10-14 RX ADMIN — SODIUM CHLORIDE, SODIUM LACTATE, CALCIUM CHLORIDE, MAGNESIUM CHLORIDE AND DEXTROSE 2000 ML: 1.5; 538; 448; 18.3; 5.08 INJECTION, SOLUTION INTRAPERITONEAL at 05:39

## 2024-10-14 RX ADMIN — CEFEPIME 1000 MG: 1 INJECTION, POWDER, FOR SOLUTION INTRAMUSCULAR; INTRAVENOUS at 12:10

## 2024-10-14 RX ADMIN — ISOSORBIDE MONONITRATE 30 MG: 30 TABLET, EXTENDED RELEASE ORAL at 08:51

## 2024-10-14 RX ADMIN — CLOPIDOGREL BISULFATE 75 MG: 75 TABLET ORAL at 08:52

## 2024-10-14 RX ADMIN — HEPARIN SODIUM 5000 UNITS: 5000 INJECTION INTRAVENOUS; SUBCUTANEOUS at 13:21

## 2024-10-14 RX ADMIN — SACUBITRIL AND VALSARTAN 1 TABLET: 24; 26 TABLET, FILM COATED ORAL at 09:00

## 2024-10-14 RX ADMIN — HYDROMORPHONE HYDROCHLORIDE 0.5 MG: 1 INJECTION, SOLUTION INTRAMUSCULAR; INTRAVENOUS; SUBCUTANEOUS at 05:44

## 2024-10-14 RX ADMIN — SODIUM CHLORIDE, SODIUM LACTATE, CALCIUM CHLORIDE, MAGNESIUM CHLORIDE AND DEXTROSE 2000 ML: 1.5; 538; 448; 18.3; 5.08 INJECTION, SOLUTION INTRAPERITONEAL at 01:21

## 2024-10-14 RX ADMIN — POTASSIUM CHLORIDE 40 MEQ: 1500 TABLET, EXTENDED RELEASE ORAL at 08:51

## 2024-10-14 ASSESSMENT — ENCOUNTER SYMPTOMS
ABDOMINAL DISTENTION: 1
WHEEZING: 0
BACK PAIN: 0
EYE REDNESS: 0
SORE THROAT: 0
SINUS PRESSURE: 0
RHINORRHEA: 0
SINUS PAIN: 0
NAUSEA: 0
EYE DISCHARGE: 0
CONSTIPATION: 0
COUGH: 0
DIARRHEA: 0
SHORTNESS OF BREATH: 0
ABDOMINAL PAIN: 0

## 2024-10-14 ASSESSMENT — PAIN SCALES - GENERAL
PAINLEVEL_OUTOF10: 0
PAINLEVEL_OUTOF10: 7
PAINLEVEL_OUTOF10: 0

## 2024-10-14 ASSESSMENT — PAIN DESCRIPTION - LOCATION: LOCATION: SHOULDER;ABDOMEN

## 2024-10-14 ASSESSMENT — PAIN DESCRIPTION - DESCRIPTORS: DESCRIPTORS: ACHING

## 2024-10-14 NOTE — CARE COORDINATION
Case Management -  Discharge Note      Patient Name: Te Garay                   YOB: 1965            Readmission Risk (Low < 19, Mod (19-27), High > 27): Readmission Risk Score: 36.2    Current PCP: Reid Lei MD      (IMM) Important Message from Medicare:    Has pt received appropriate IMM before discharge if required: yes  Date: 10/14/24    PT AM-PAC:   /24  OT AM-PAC:   /24    Patient/patient representative has been educated on the benefits of home as well as the possible risks of declining recommended services. Patient/patient representative has acknowledged the information provided and decided on the following discharge plan. Patient/ patient representative has been provided freedom of choice regarding service provider, supported by basic dialogue that supports the patient's individualized plan of care/goals.      Financial    Payor: MEDICARE / Plan: MEDICARE PART A AND B / Product Type: *No Product type* /     Pharmacy:  Potential assistance Purchasing Medications:    Meds-to-Beds request: Yes      [facility] - [place], [place] - [address] -  [phone] - F [phone]  [address] [address]  Phone: [phone] Fax: [phone]    [id number] [place], [place] - [address] [facility] [phone] - F [phone]  [address]  [facility] [address]  Phone: [phone] Fax: [phone]    [id number] - [place], [place] - [address] [facility] -  [phone] - F [phone]  [address]  [facility] [address]  Phone: [phone] Fax: [phone]      Notes:    Additional Case Management Notes: SW spoke with patient PD nurse Ermelinda, who reports that she is aware of patient's abx needs and she will meet with patient on 10/15/24 at 3PM.     Amanda Mccloud  [address] , [place] [address]  Phone:  [phone]  Fax: [phone]    Electronically signed by Ann Marie

## 2024-10-14 NOTE — PROGRESS NOTES
CLINICAL PHARMACY NOTE: MEDS TO BEDS    Total # of Prescriptions Filled: 4   The following medications were delivered to the patient:  Potassium cl ER 20meq  Oxycodone/APAP 5/325mg  Sertraline 50mg  Fluconazole 100mg    Additional Documentation:     LAURA Flores approved medication delivery    Medications were delivered to patient's room and patient signed for    Nidia Lindsey CPhT

## 2024-10-14 NOTE — DISCHARGE INSTR - COC
Continuity of Care Form    Patient Name: Te Garay   :  1965  MRN:  [id number]    Admit date:  10/11/2024  Discharge date:  10/14/2024    Code Status Order: Full Code   Advance Directives:   Advance Care Flowsheet Documentation             Admitting Physician:  Mee Don DO  PCP: Reid Lei MD    Discharging Nurse: Alba  Discharging Hospital Unit/Room#: 3TN-3378/3378-01  Discharging Unit Phone Number: [phone]    Emergency Contact:   Extended Emergency Contact Information  Primary Emergency Contact: Sachi Garay  Address: [address] [place] [address]  Home Phone: [phone]  Mobile Phone: [phone]  Relation: Spouse  Secondary Emergency Contact: Kendell Rodriguez  Home Phone: [phone]  Relation: Child    Past Surgical History:  Past Surgical History:   Procedure Laterality Date    BLADDER SURGERY Left 2023    CYSTOSCOPY, LEFT URETEROSCOPY, STONE BASKET MANIPULATION, PLACEMENT OF LEFT URETERAL STENT performed by Chevy Sepulveda MD at [facility] OR    CARDIAC CATHETERIZATION      CARDIAC PROCEDURE N/A 2024    Left and right heart w coronary bypass graft performed by David Parish MD at [facility] CARDIAC CATH LAB    CARDIAC PROCEDURE N/A 2024    Left heart cath / coronary angiography with PCI performed by Larry Marin MD at [facility] CARDIAC CATH LAB    CARDIAC PROCEDURE N/A 2024    Insert stent juan coronary bypass graft performed by Larry Marin MD at [facility] CARDIAC CATH LAB    CARDIAC PROCEDURE N/A 2024    Intravascular ultrasound performed by Larry Marin MD at [facility] CARDIAC CATH LAB    CARDIAC SURGERY      triple bypass at     CAROTID STENT PLACEMENT Right     CORONARY ARTERY BYPASS GRAFT      FOOT DEBRIDEMENT Left 10/16/2023    LEFT FOOT DEBRIDEMENT INCISION AND DRAINAGE WITH BONE BIOPSY performed by David Lowe DPM at [facility] OR    FOOT DEBRIDEMENT Left 10/23/2023    LEFT FOOT INCISION AND

## 2024-10-14 NOTE — DISCHARGE INSTRUCTIONS
Follow-up with podiatry outpatient.  Continue wound care to the left foot.  Take fluconazole daily as prescribed.

## 2024-10-14 NOTE — CARE COORDINATION
10/14/24 1549   IMM Letter   IMM Letter given to Patient/Family/Significant other/Guardian/POA/by: IMM given by CM   IMM Letter date given: 10/14/24   IMM Letter time given: 1520

## 2024-10-14 NOTE — PROGRESS NOTES
Nephrology Progress Note  [facility]  [phone]   [employer]    Patient:  Te Garay   : 1965    CC:   ESRD    Subjective:  Patient seen and examined  Mild hypokalemia this a.m.  Abdominal pain better  No nausea or emesis  Has been afebrile  Blood pressures fairly controlled  On room air      Meds:  Scheduled Meds:   carvedilol  25 mg Oral BID WC    sacubitril-valsartan  1 tablet Oral BID    Insulin Pump - Bolus Dose   SubCUTAneous 4x Daily AC & HS    Insulin Pump - Basal Dose   SubCUTAneous Daily    torsemide  50 mg Oral Daily    dianeal lo-miller 1.5%  2,000 mL IntraPERitoneal Q4H    sertraline  50 mg Oral Daily    sodium chloride flush  5-40 mL IntraVENous 2 times per day    heparin (porcine)  5,000 Units SubCUTAneous 3 times per day    aspirin  81 mg Oral Daily    isosorbide mononitrate  30 mg Oral Daily    levETIRAcetam  500 mg Oral Daily    ranolazine  500 mg Oral BID    rosuvastatin  10 mg Oral QPM    vancomycin (VANCOCIN) intermittent dosing (placeholder)   Other RX Placeholder    cefepime  1,000 mg IntraVENous Q24H    clopidogrel  75 mg Oral Daily     Continuous Infusions:   dextrose       PRN Meds:.dextrose bolus **OR** dextrose bolus, glucagon (rDNA), dextrose, diphenhydrAMINE, sodium chloride flush, ondansetron **OR** ondansetron, polyethylene glycol, acetaminophen **OR** acetaminophen, HYDROmorphone **OR** HYDROmorphone    Vitals:  BP (!) 140/68   Pulse 74   Temp 97.8 °F (36.6 °C) (Oral)   Resp 16   Ht 1.727 m (5' 8\")   Wt 88.7 kg (195 lb 9.6 oz) Comment: drained  SpO2 98%   BMI 29.74 kg/m²     Physical Exam:  Gen: Resting in bed, not in pain or respiratory distress  HEENT: No pallor or icterus  CV: S1, S2 normal, regular rhythm  Lungs: Decreased breath sounds at the bases  Abd: S/NT +BS , PD catheter +under dressing   Ext: No edema, no cyanosis  Skin: Warm.  No rashes appreciated.    Labs:  CBC:   Lab Results   Component Value Date/Time    WBC 3.9 10/14/2024  05:31 AM    RBC 3.91 10/14/2024 05:31 AM    HGB 11.1 10/14/2024 05:31 AM    HCT 34.3 10/14/2024 05:31 AM    MCV 87.7 10/14/2024 05:31 AM    MCH 28.3 10/14/2024 05:31 AM    MCHC 32.3 10/14/2024 05:31 AM    RDW 14.0 10/14/2024 05:31 AM     10/14/2024 05:31 AM    MPV 9.7 10/14/2024 05:31 AM     CMP:    Lab Results   Component Value Date/Time     10/14/2024 05:31 AM    K 3.2 10/14/2024 05:31 AM    CL 97 10/14/2024 05:31 AM    CO2 25 10/14/2024 05:31 AM    BUN 39 10/14/2024 05:31 AM    CREATININE 6.3 10/14/2024 05:31 AM    GFRAA 9 10/03/2022 04:45 AM    GFRAA 44 06/15/2013 01:00 AM    AGRATIO 1.0 10/14/2024 05:31 AM    LABGLOM 10 10/14/2024 05:31 AM    LABGLOM 6 04/30/2024 04:51 AM    GLUCOSE 225 10/14/2024 05:31 AM    CALCIUM 8.1 10/14/2024 05:31 AM    BILITOT <0.2 10/14/2024 05:31 AM    ALKPHOS 70 10/14/2024 05:31 AM    AST 9 10/14/2024 05:31 AM    ALT <5 10/14/2024 05:31 AM       Assessment/Plan:    PD related peritonitis  Initial PD Fluid turbid, >130,000 white cells and 90% neutrophils suggestive for peritonitis.  PD cell count improving   PD fluid cultures positive for staph epidermidis, sensitive to Vancomycin  CT with findings equivocal cholecystitis.   Stop cefepime  Continue vancomycin  Can be on intraperitoneal vancomycin after discharge, will discuss with the charge RN at [facility] on fluconazole for primary prophylaxis for peritonitis   PD fluid cell count with no evidence of peritonitis  Repeat Cell count and PD fluid cultures in one week as out-pt  Discussed with Charge RN at out-pt unit, can see the RN in out-pt unit at 3 PM on 10/15/2024       ESRD - on CCPD at home x 4.5 years . Never had peritonitis in the past.   C/b peritonitis.  Follows with dr Turner .   Changed to CCPD    Hypokalemia replete  Can check as out-pt to follow-up  Replete Mag    Hypomagnesemia  replete     Hyponatremia   Expect improvement with fluid removal on dialysis    Hypertension fairly controlled  Can do

## 2024-10-14 NOTE — PLAN OF CARE
Problem: Chronic Conditions and Co-morbidities  Goal: Patient's chronic conditions and co-morbidity symptoms are monitored and maintained or improved  10/14/2024 1523 by Alba Sharp RN  Outcome: Adequate for Discharge  10/14/2024 1415 by Alba Sharp RN  Outcome: Progressing     Problem: Discharge Planning  Goal: Discharge to home or other facility with appropriate resources  10/14/2024 1523 by Alba Sharp RN  Outcome: Adequate for Discharge  10/14/2024 1415 by Alba Sharp RN  Outcome: Progressing     Problem: ABCDS Injury Assessment  Goal: Absence of physical injury  10/14/2024 1523 by Alba Sharp RN  Outcome: Adequate for Discharge  10/14/2024 1415 by Alba Sharp RN  Outcome: Progressing     Problem: Safety - Adult  Goal: Free from fall injury  10/14/2024 1523 by Alba Sharp RN  Outcome: Adequate for Discharge  10/14/2024 1415 by Alba Sharp RN  Outcome: Progressing     Problem: Pain  Goal: Verbalizes/displays adequate comfort level or baseline comfort level  10/14/2024 1523 by Alba Sharp RN  Outcome: Adequate for Discharge  10/14/2024 1415 by Alba Sharp RN  Outcome: Progressing     Problem: Hematologic - Adult  Goal: Maintains hematologic stability  10/14/2024 1523 by Alba Sharp RN  Outcome: Adequate for Discharge  10/14/2024 1415 by Alba Sharp RN  Outcome: Progressing

## 2024-10-14 NOTE — PROGRESS NOTES
V2.0    [facility] [place] Note      Name:  Te Garay /Age/Sex: 1965  (59 y.o. male)   MRN & CSN:  [id number] & [id number] Encounter Date/Time: 10/14/2024 12:12 PM EDT   Location:  [facility]3378/3378-01 PCP: Reid Lei MD     Attending:Jayla Rubio MD       Hospital Day: 4    Assessment and Recommendations   Te Garay is a 59 y.o. male with pmh of ESRD on PD, CAD, HFrEF, HTN, seizures, CVA involving the TAMICA, PVD, T1DM presented from home with complaints of abdominal pain, cloudy PD catheter fluid, subjective fever and chills concerning for Peritonitis associated with peritoneal dialysis, initial encounter (HCC)      Plan:     Peritonitis associated with PD:  PD Fluid turbid, >130,000 white cells and 90% neutrophils suggestive for peritonitis.    PD fluid cultures positive for Staph epidermidis.  Blood cultures NGTD.  CT abdomen and pelvis: Cholelithiasis with equivocal cholecystitis.  ID & Nephrology consults appreciated.  Contin plan is to discharge home with intraperitoneal vancomycin.  Waiting for nephrology to confirm an outpatient antibiotics.     CAD s/p PCI:  HFrEF without acute decompensation:  s/p high risk PCI to SVG-OM  on  with Dr. Marin.    TTE 2024: LVEF 35 to 40% with regional wall motion abnormalities.  Continue Ranexa, Coreg, Entresto, Lasix, Crestor, Imdur.     Recent acute stroke (2024): Continue aspirin and statin.     Insulin dependent diabetes mellitus 1 with hyperglycemia:   A1c 8.0% 2024.  Patient prefers to continue using insulin pump and Dexcom.     ESRD on PD: Nephrology consulted. Continue CCPD.     Seizure disorder: Continue AEDs.  Seizure precautions.    Hypokalemia/hypomagnesemia: Replaced.  Monitor electrolytes and replace as needed.    Left Foot Diabetic Ulcer/Charcot arthropathy  Continue local wound care.  Follow up with Dr. Lundy out patient.    Diet ADULT DIET; Regular; 4 carb choices (60 gm/meal)   DVT Prophylaxis [] Lovenox, [x]  Heparin,  IntraVENous 2 times per day    heparin (porcine)  5,000 Units SubCUTAneous 3 times per day    aspirin  81 mg Oral Daily    isosorbide mononitrate  30 mg Oral Daily    levETIRAcetam  500 mg Oral Daily    ranolazine  500 mg Oral BID    rosuvastatin  10 mg Oral QPM    vancomycin (VANCOCIN) intermittent dosing (placeholder)   Other RX Placeholder    cefepime  1,000 mg IntraVENous Q24H    clopidogrel  75 mg Oral Daily      Infusions:    dextrose       PRN Meds: dextrose bolus, 125 mL, PRN   Or  dextrose bolus, 250 mL, PRN  glucagon (rDNA), 1 mg, PRN  dextrose, , Continuous PRN  diphenhydrAMINE, 25 mg, Q6H PRN  sodium chloride flush, 5-40 mL, PRN  ondansetron, 4 mg, Q8H PRN   Or  ondansetron, 4 mg, Q6H PRN  polyethylene glycol, 17 g, Daily PRN  acetaminophen, 650 mg, Q6H PRN   Or  acetaminophen, 650 mg, Q6H PRN  HYDROmorphone, 0.25 mg, Q4H PRN   Or  HYDROmorphone, 0.5 mg, Q4H PRN        Labs and Imaging   US GALLBLADDER RUQ    Result Date: 10/11/2024  EXAMINATION: RIGHT UPPER QUADRANT ULTRASOUND 10/11/2024 6:36 pm COMPARISON: None. HISTORY: ORDERING SYSTEM PROVIDED HISTORY: Rule out cholecystitis TECHNOLOGIST PROVIDED HISTORY: Reason for exam:->Rule out cholecystitis FINDINGS: LIVER:  The liver demonstrates normal echogenicity without evidence of intrahepatic biliary ductal dilatation. BILIARY SYSTEM: Multiple gallstones.  Negative for sonographic Connor sign. Common bile duct is within normal limits RIGHT KIDNEY: Right kidney it appears to be hyperechoic.  No hydronephrosis. PANCREAS:  Hyperechoic OTHER: No evidence of right upper quadrant ascites.     Cholelithiasis but no sonographic evidence for cholecystitis.     CT ABDOMEN PELVIS WO CONTRAST Additional Contrast? None    Result Date: 10/11/2024  EXAMINATION: CT OF THE ABDOMEN AND PELVIS WITHOUT CONTRAST 10/11/2024 8:49 am TECHNIQUE: CT of the abdomen and pelvis was performed without the administration of intravenous contrast. Multiplanar reformatted images are

## 2024-10-14 NOTE — PLAN OF CARE
Problem: Chronic Conditions and Co-morbidities  Goal: Patient's chronic conditions and co-morbidity symptoms are monitored and maintained or improved  10/13/2024 2159 by Maggy Delgado RN  Outcome: Progressing  Flowsheets (Taken 10/13/2024 2000)  Care Plan - Patient's Chronic Conditions and Co-Morbidity Symptoms are Monitored and Maintained or Improved: Monitor and assess patient's chronic conditions and comorbid symptoms for stability, deterioration, or improvement  10/13/2024 1120 by Alba Sharp RN  Outcome: Progressing     Problem: Discharge Planning  Goal: Discharge to home or other facility with appropriate resources  10/13/2024 2159 by Maggy Delgado RN  Outcome: Progressing  Flowsheets (Taken 10/13/2024 2000)  Discharge to home or other facility with appropriate resources: Identify barriers to discharge with patient and caregiver  10/13/2024 1120 by Alba Sharp RN  Outcome: Progressing     Problem: ABCDS Injury Assessment  Goal: Absence of physical injury  10/13/2024 2159 by Maggy Delgado RN  Outcome: Progressing  10/13/2024 1120 by Alba Sharp RN  Outcome: Progressing     Problem: Safety - Adult  Goal: Free from fall injury  10/13/2024 2159 by Maggy Delgado RN  Outcome: Progressing  10/13/2024 1120 by Alba Sharp RN  Outcome: Progressing     Problem: Pain  Goal: Verbalizes/displays adequate comfort level or baseline comfort level  10/13/2024 2159 by Maggy Delgado RN  Outcome: Progressing  10/13/2024 1120 by Alba Sharp RN  Outcome: Progressing     Problem: Hematologic - Adult  Goal: Maintains hematologic stability  10/13/2024 2159 by Maggy Delgado RN  Outcome: Progressing  Flowsheets (Taken 10/13/2024 2000)  Maintains hematologic stability: Assess for signs and symptoms of bleeding or hemorrhage  10/13/2024 1120 by Alba Sharp RN  Outcome: Progressing

## 2024-10-14 NOTE — PROGRESS NOTES
Infectious Diseases   Progress Note      Admission Date: 10/11/2024  Hospital Day: Hospital Day: 4   Attending: Jayla Rubio MD  Date of service: 10/14/2024     Chief complaint/ Reason for consult:     Sepsis on admission with fever 100.8, tachycardia, tachypnea  Severe peritonitis in the setting of PD catheter  Abdominal pain  Concern for cholecystitis on CT scan  Cholelithiasis  End-stage renal disease on peritoneal dialysis  Diarrhea at home  Coronary artery disease status post CABG    Microbiology:      I have reviewed allavailable micro lab data and cultures    Results       Procedure Component Value Units Date/Time    Clostridium difficile toxin/antigen [[id number]]     Order Status: Sent Specimen: Stool     GI Bacterial Pathogens By PCR [[id number]]     Order Status: Canceled Specimen: Stool     COVID-19 & Influenza Combo [[id number]] Collected: 10/11/24 0452    Order Status: Completed Specimen: Nasopharyngeal Swab Updated: 10/11/24 0602     SARS-CoV-2 RNA, RT PCR NOT DETECTED     Comment: Not Detected results do not preclude SARS-CoV-2 infection and  should not be used as the sole basis for patient management  decisions.  Results must be combined with clinical observations,  patient history, and epidemiological information.  Testing was performed using NAOMI JOSE MANUEL SARS-CoV-2 and Influenza A/B  nucleic acid assay. This test is a multiplex Real-Time Reverse  Transcriptase Polymerase Chain Reaction (RT-PCR)-based in vitro  diagnostic test intended for the qualitative detection of nucleic  acids from SARS-CoV-2, influenza A, and influenza B in nasopharyngeal  and nasal swab specimens for use under the FDA’s Emergency Use  Authorization (EUA) only.    Patient Fact Sheet:  https://www.fda.gov/media/771868/download  Provider Fact Sheet: https://www.fda.gov/media/377013/download  EUA: https://www.fda.gov/media/747591/download  IFU: https://www.fda.gov/media/800430/download    Methodology:  RT-PCR

## 2024-10-14 NOTE — PLAN OF CARE
Problem: Chronic Conditions and Co-morbidities  Goal: Patient's chronic conditions and co-morbidity symptoms are monitored and maintained or improved  10/14/2024 1415 by Alba Sharp RN  Outcome: Progressing  10/14/2024 1415 by Alba Sharp RN  Outcome: Progressing     Problem: Discharge Planning  Goal: Discharge to home or other facility with appropriate resources  10/14/2024 1415 by Alba Sharp RN  Outcome: Progressing  10/14/2024 1415 by Alba Sharp RN  Outcome: Progressing     Problem: ABCDS Injury Assessment  Goal: Absence of physical injury  10/14/2024 1415 by Alba Sharp RN  Outcome: Progressing  10/14/2024 1415 by Alba Sharp RN  Outcome: Progressing     Problem: Safety - Adult  Goal: Free from fall injury  10/14/2024 1415 by Alba Sharp RN  Outcome: Progressing  10/14/2024 1415 by Alba Sharp RN  Outcome: Progressing     Problem: Pain  Goal: Verbalizes/displays adequate comfort level or baseline comfort level  10/14/2024 1415 by Alba Sharp RN  Outcome: Progressing  10/14/2024 1415 by Alba Sharp RN  Outcome: Progressing     Problem: Hematologic - Adult  Goal: Maintains hematologic stability  10/14/2024 1415 by Alba Sharp RN  Outcome: Progressing  10/14/2024 1415 by Alba Sharp RN  Outcome: Progressing

## 2024-10-15 LAB
BACTERIA BLD CULT ORG #2: NORMAL
BACTERIA BLD CULT: NORMAL

## 2024-10-15 RX ORDER — ACYCLOVIR 400 MG/1
1 TABLET ORAL ONCE
Qty: 1 EACH | Refills: 5 | Status: SHIPPED | OUTPATIENT
Start: 2024-10-15 | End: 2024-10-15

## 2024-10-15 RX ORDER — RANOLAZINE 500 MG/1
TABLET, EXTENDED RELEASE ORAL
Qty: 180 TABLET | Refills: 2 | Status: SHIPPED | OUTPATIENT
Start: 2024-10-15

## 2024-10-15 RX ORDER — ACYCLOVIR 400 MG/1
1 TABLET ORAL ONCE
Qty: 1 EACH | Refills: 5 | Status: SHIPPED | OUTPATIENT
Start: 2024-10-15 | End: 2024-10-24 | Stop reason: SDUPTHER

## 2024-10-16 ENCOUNTER — HOSPITAL ENCOUNTER (OUTPATIENT)
Dept: PHYSICAL THERAPY | Age: 59
Setting detail: THERAPIES SERIES
Discharge: HOME | End: 2024-10-16
Payer: MEDICARE

## 2024-10-16 LAB — PATH CONSULT FLUID: NORMAL

## 2024-10-16 NOTE — FLOWSHEET NOTE
[facility] [address], [place] [address] office: [phone] fax: [phone]      Physical Therapy: TREATMENT/PROGRESS NOTE   Patient: Te Garay (59 y.o. male)   Examination Date: 10/16/2024   :  1965 MRN: [id number]   Visit #: 3   Insurance Allowable Auth Needed   BMN []Yes    [x]No    Insurance: Payor: MEDICARE / Plan: MEDICARE PART A AND B / Product Type: *No Product type* /   Insurance ID: [id number] - (Medicare)  Secondary Insurance (if applicable): BCBS   Treatment Diagnosis:     ICD-10-CM    1. Chronic pain of right knee  M25.561     G89.29       2. Decreased strength involving knee joint  R29.898       3. Impaired functional mobility, balance, gait, and endurance  Z74.09          Medical Diagnosis:  Chondromalacia of right patellofemoral joint [M22.41]   Referring Physician: Brandon Herman MD  PCP: Reid Lei MD     Plan of care signed (Y/N): YES    Date of Patient follow up with Physician:      Plan of Care Report: NO  POC update due: (10 visits /OR AUTH LIMITS, whichever is less)  2024                                             Medical History:  Comorbidities:  Diabetes (Type I or II)  Hypertension  Stroke/TIA  Osteoarthritis  Anxiety  Depression  Relevant Medical History: T1 DM, ESRD (on dialysis), HTN, CHF, CAD, hx of CABG, anxiety, depression                                         Precautions/ Contra-indications:           Latex allergy:  NO  Pacemaker:    NO  Contraindications for Manipulation: None  Date of Surgery: n/a  Other:    Red Flags:  None    Suicide Screening:   The patient did not verbalize a primary behavioral concern, suicidal ideation, suicidal intent, or demonstrate suicidal behaviors.    Preferred Language for Healthcare:   [x] English       [] other:    SUBJECTIVE EXAMINATION     Patient stated complaint: Pt states that the knee has been feeling good since last session. He states that his L

## 2024-10-17 ENCOUNTER — TELEPHONE (OUTPATIENT)
Dept: NEPHROLOGY | Age: 59
End: 2024-10-17

## 2024-10-17 DIAGNOSIS — T85.71XA PERITONITIS ASSOCIATED WITH PERITONEAL DIALYSIS, INITIAL ENCOUNTER (HCC): Primary | ICD-10-CM

## 2024-10-18 ENCOUNTER — HOSPITAL ENCOUNTER (OUTPATIENT)
Age: 59
Discharge: HOME | End: 2024-10-18
Payer: MEDICARE

## 2024-10-18 DIAGNOSIS — T85.71XA PERITONITIS ASSOCIATED WITH PERITONEAL DIALYSIS, INITIAL ENCOUNTER (HCC): ICD-10-CM

## 2024-10-18 LAB — VANCOMYCIN TROUGH SERPL-MCNC: 17.5 UG/ML (ref 10–20)

## 2024-10-18 PROCEDURE — 36415 COLL VENOUS BLD VENIPUNCTURE: CPT

## 2024-10-18 PROCEDURE — 80202 ASSAY OF VANCOMYCIN: CPT

## 2024-10-18 NOTE — DISCHARGE SUMMARY
V2.0  Discharge Summary    Name:  Te Garay /Age/Sex: 1965 (59 y.o. male)   Admit Date: 10/11/2024  Discharge Date: 10/18/24    MRN & CSN:  [id number] & [id number] Encounter Date and Time 10/18/24 4:28 PM EDT    Attending:  No att. providers found Discharging Provider: Jayla Rubio MD       Hospital Course:     Brief HPI: Te Garay is a 59 y.o. male with pmh of ESRD on PD, CAD, HFrEF, HTN, seizures, CVA involving the TAMICA, PVD, T1DM presented from home with complaints of abdominal pain, cloudy PD catheter fluid, subjective fever and chills concerning for Peritonitis associated with peritoneal dialysis, initial encounter ([facility])     Peritonitis associated with PD:  PD Fluid turbid, >130,000 white cells and 90% neutrophils suggestive for peritonitis.    PD cell count improving.    PD fluid cultures positive for Staph epidermidis.  Blood cultures NGTD.  CT abdomen and pelvis: Cholelithiasis with equivocal cholecystitis.  ID & Nephrology consulted: Discharged on one week of intra-peritoneal Vancomycin  Started on fluconazole 200 mg daily for prophylaxis.    CAD s/p PCI:  HFrEF without acute decompensation:  s/p high risk PCI to SVG-OM  on  with Dr. Marin.    TTE 2024: LVEF 35 to 40% with regional wall motion abnormalities.  Continued Ranexa, Coreg, Entresto, Lasix, Crestor, Imdur.     Recent acute stroke (2024): Continued aspirin and statin.     Insulin dependent diabetes mellitus 1 with hyperglycemia:   A1c 8.0% 2024.  Patient on insulin pump.     ESRD on PD: Nephrology consulted. Continued CCPD.     Seizure disorder: Continued AEDs.       Hypokalemia/hypomagnesemia: Replaced.     Left Foot Diabetic Ulcer/Charcot arthropathy  Continued local wound care.  Follow up with Dr. Lundy out patient.      The patient expressed appropriate understanding of, and agreement with the discharge recommendations, medications, and plan.     Consults this admission:  IP CONSULT TO

## 2024-10-24 ENCOUNTER — TELEPHONE (OUTPATIENT)
Dept: ENDOCRINOLOGY | Age: 59
End: 2024-10-24

## 2024-10-24 ENCOUNTER — HOSPITAL ENCOUNTER (OUTPATIENT)
Dept: PHYSICAL THERAPY | Age: 59
Setting detail: THERAPIES SERIES
End: 2024-10-24
Payer: MEDICARE

## 2024-10-24 DIAGNOSIS — E10.65 UNCONTROLLED TYPE 1 DIABETES MELLITUS WITH HYPERGLYCEMIA (HCC): Primary | ICD-10-CM

## 2024-10-24 RX ORDER — ACYCLOVIR 400 MG/1
TABLET ORAL
Qty: 3 EACH | Refills: 2 | Status: SHIPPED | OUTPATIENT
Start: 2024-10-24

## 2024-10-24 NOTE — TELEPHONE ENCOUNTER
Pt showed up 20 min late for his appt,  told him he could not be seen and he turned around and walked out

## 2024-10-24 NOTE — TELEPHONE ENCOUNTER
Pt called in and said he needs his dexcom. I let him know we have not seen him in over a year since August 2023. I let him know I would need to schedule a follow up and then I would send the request over to the dr. He said he has been in the hospital over 22 times and he said he has no idea why the dr has not came to see him. He said all of his other drs get notified he is in the hospital and they come to see him. I let him know we do not round in the hospital and he would need to schedule a follow up and see his dr on a regular basis. He said dr castillo needs to come and see him while he is in the hospital because they do not know how to manage his care. Pt was very aggressive on the phone. Pt did schedule today at 2:20 with Dr. Castillo at [facility].

## 2024-10-25 ENCOUNTER — HOSPITAL ENCOUNTER (OUTPATIENT)
Age: 59
Discharge: HOME | End: 2024-10-27
Payer: MEDICARE

## 2024-10-25 ENCOUNTER — OFFICE VISIT (OUTPATIENT)
Dept: CARDIOLOGY CLINIC | Age: 59
End: 2024-10-25

## 2024-10-25 VITALS
OXYGEN SATURATION: 99 % | HEIGHT: 68 IN | HEART RATE: 70 BPM | WEIGHT: 191 LBS | DIASTOLIC BLOOD PRESSURE: 78 MMHG | SYSTOLIC BLOOD PRESSURE: 126 MMHG | BODY MASS INDEX: 28.95 KG/M2

## 2024-10-25 VITALS
WEIGHT: 195 LBS | HEIGHT: 68 IN | BODY MASS INDEX: 29.55 KG/M2 | SYSTOLIC BLOOD PRESSURE: 137 MMHG | DIASTOLIC BLOOD PRESSURE: 75 MMHG

## 2024-10-25 DIAGNOSIS — I25.10 CORONARY ARTERY DISEASE DUE TO LIPID RICH PLAQUE: ICD-10-CM

## 2024-10-25 DIAGNOSIS — I10 HTN (HYPERTENSION), BENIGN: ICD-10-CM

## 2024-10-25 DIAGNOSIS — Z95.1 S/P CABG (CORONARY ARTERY BYPASS GRAFT): ICD-10-CM

## 2024-10-25 DIAGNOSIS — I25.83 CORONARY ARTERY DISEASE DUE TO LIPID RICH PLAQUE: ICD-10-CM

## 2024-10-25 DIAGNOSIS — I25.5 ISCHEMIC CARDIOMYOPATHY: ICD-10-CM

## 2024-10-25 DIAGNOSIS — I42.9 CARDIOMYOPATHY, UNSPECIFIED TYPE (HCC): ICD-10-CM

## 2024-10-25 DIAGNOSIS — E78.2 MIXED HYPERLIPIDEMIA: Primary | ICD-10-CM

## 2024-10-25 DIAGNOSIS — I50.22 CHRONIC SYSTOLIC HEART FAILURE (HCC): ICD-10-CM

## 2024-10-25 LAB
ECHO AO ASC DIAM: 2.9 CM
ECHO AO ASCENDING AORTA INDEX: 1.44 CM/M2
ECHO AO ROOT DIAM: 2.7 CM
ECHO AO ROOT INDEX: 1.34 CM/M2
ECHO BSA: 2.06 M2
ECHO LA AREA 2C: 16.4 CM2
ECHO LA AREA 4C: 16.9 CM2
ECHO LA DIAMETER INDEX: 1.83 CM/M2
ECHO LA DIAMETER: 3.7 CM
ECHO LA MAJOR AXIS: 5.3 CM
ECHO LA MINOR AXIS: 5.4 CM
ECHO LA TO AORTIC ROOT RATIO: 1.37
ECHO LA VOL BP: 43 ML (ref 18–58)
ECHO LA VOL MOD A2C: 41 ML (ref 18–58)
ECHO LA VOL MOD A4C: 44 ML (ref 18–58)
ECHO LA VOL/BSA BIPLANE: 21 ML/M2 (ref 16–34)
ECHO LA VOLUME INDEX MOD A2C: 20 ML/M2 (ref 16–34)
ECHO LA VOLUME INDEX MOD A4C: 22 ML/M2 (ref 16–34)
ECHO LV EDV A2C: 170 ML
ECHO LV EDV A4C: 152 ML
ECHO LV EDV INDEX A4C: 75 ML/M2
ECHO LV EDV NDEX A2C: 84 ML/M2
ECHO LV EJECTION FRACTION A2C: 54 %
ECHO LV EJECTION FRACTION A4C: 45 %
ECHO LV EJECTION FRACTION BIPLANE: 51 % (ref 55–100)
ECHO LV ESV A2C: 78 ML
ECHO LV ESV A4C: 84 ML
ECHO LV ESV INDEX A2C: 39 ML/M2
ECHO LV ESV INDEX A4C: 42 ML/M2
ECHO LV FRACTIONAL SHORTENING: 5 % (ref 28–44)
ECHO LV INTERNAL DIMENSION DIASTOLE INDEX: 2.82 CM/M2
ECHO LV INTERNAL DIMENSION DIASTOLIC: 5.7 CM (ref 4.2–5.9)
ECHO LV INTERNAL DIMENSION SYSTOLIC INDEX: 2.67 CM/M2
ECHO LV INTERNAL DIMENSION SYSTOLIC: 5.4 CM
ECHO LV IVSD: 0.7 CM (ref 0.6–1)
ECHO LV MASS 2D: 157.1 G (ref 88–224)
ECHO LV MASS INDEX 2D: 77.8 G/M2 (ref 49–115)
ECHO LV POSTERIOR WALL DIASTOLIC: 0.8 CM (ref 0.6–1)
ECHO LV RELATIVE WALL THICKNESS RATIO: 0.28

## 2024-10-25 PROCEDURE — 93321 DOPPLER ECHO F-UP/LMTD STD: CPT | Performed by: INTERNAL MEDICINE

## 2024-10-25 PROCEDURE — 93325 DOPPLER ECHO COLOR FLOW MAPG: CPT | Performed by: INTERNAL MEDICINE

## 2024-10-25 PROCEDURE — 6360000004 HC RX CONTRAST MEDICATION: Performed by: NURSE PRACTITIONER

## 2024-10-25 PROCEDURE — 93308 TTE F-UP OR LMTD: CPT | Performed by: INTERNAL MEDICINE

## 2024-10-25 PROCEDURE — 93325 DOPPLER ECHO COLOR FLOW MAPG: CPT

## 2024-10-25 RX ADMIN — SULFUR HEXAFLUORIDE 2 ML: 60.7; .19; .19 INJECTION, POWDER, LYOPHILIZED, FOR SUSPENSION INTRAVENOUS; INTRAVESICAL at 11:12

## 2024-10-25 NOTE — PROGRESS NOTES
[facility]    10/25/2024    Te Garay (:  1965) is a 59 y.o. male here for cardiovascular follow up for CAD, ischemic cardiomyopathy and heart failure.     Referring Provider: Reid Lei MD    HISTORY: Te Garay has a significant history of coronary artery disease s/p CABG in  as well as multiple stents, ischemic cardiomyopathy, combined systolic and diastolic heart failure, Htn, Hld, carotid artery stenosis s/p Rt carotid artery stenting , PVD. Additional history includes type 1 diabetes mellitus, ESRD on peritoneal dialysis also has been on the kidney/pancreas transplant list, CVA.    He was referred to  transplant team for consideration of kidney-pancreas transplant, however, his evaluation has been placed on hold given his LVEF of 35% and ongoing cardiac issues.     He has had multiple admissions this year for AMS, chest pain. He had a right and left heart cath in  with my partner, Dr Parish where it was felt to be too high risk for intervention and decided on aggressive medical management. ***    Today ***      REVIEW OF SYSTEMS:  A complete review of systems was reviewed and is negative except as noted in the history of present illness.    Prior to Visit Medications    Medication Sig Taking? Authorizing Provider   ranolazine (RANEXA) 500 MG extended release tablet TAKE ONE TABLET BY MOUTH TWICE DAILY.  Jayla Rubio MD   Continuous Glucose  (DEXCOM G7 ) BEHZAD 1 each by Does not apply route once for 1 dose  Jayla Rubio MD   Continuous Glucose Sensor (DEXCOM G7 SENSOR) MISC 1 Application by Does not apply route once for 1 dose  Jayla Rubio MD   carvedilol (COREG) 12.5 MG tablet Take 2 tablets by mouth 2 times daily (with meals)  Jayla Rubio MD   sertraline (ZOLOFT) 50 MG tablet Take 1 tablet by mouth daily  Jayla Rubio MD   oxyCODONE-acetaminophen (PERCOCET) 5-325 MG per tablet Take 1 tablet by mouth every 6 hours as needed 
Elise Watts, RN    Physician Attestation: The scribe's documentation has been prepared under my direction and personally reviewed by me in its entirety.  I confirm that the note above accurately reflects all work, treatment, procedures, and medical decision making performed by me.

## 2024-11-04 DIAGNOSIS — E10.22 TYPE 1 DIABETES MELLITUS WITH STAGE 4 CHRONIC KIDNEY DISEASE (HCC): Primary | ICD-10-CM

## 2024-11-04 DIAGNOSIS — N18.4 TYPE 1 DIABETES MELLITUS WITH STAGE 4 CHRONIC KIDNEY DISEASE (HCC): Primary | ICD-10-CM

## 2024-11-04 RX ORDER — INSULIN ASPART INJECTION 100 [IU]/ML
INJECTION, SOLUTION SUBCUTANEOUS
Qty: 20 ML | Refills: 0 | Status: SHIPPED | OUTPATIENT
Start: 2024-11-04

## 2024-11-10 ENCOUNTER — APPOINTMENT (OUTPATIENT)
Dept: CT IMAGING | Age: 59
DRG: 867 | End: 2024-11-10
Payer: MEDICARE

## 2024-11-10 ENCOUNTER — HOSPITAL ENCOUNTER (INPATIENT)
Age: 59
LOS: 3 days | Discharge: HOME | DRG: 867 | End: 2024-11-14
Attending: INTERNAL MEDICINE | Admitting: INTERNAL MEDICINE
Payer: MEDICARE

## 2024-11-10 DIAGNOSIS — T85.71XA PERITONITIS ASSOCIATED WITH PERITONEAL DIALYSIS, INITIAL ENCOUNTER (HCC): Primary | ICD-10-CM

## 2024-11-10 DIAGNOSIS — N25.81 SECONDARY HYPERPARATHYROIDISM (HCC): ICD-10-CM

## 2024-11-10 DIAGNOSIS — A41.9 SEPTICEMIA (HCC): ICD-10-CM

## 2024-11-10 LAB
ALBUMIN SERPL-MCNC: 3.8 G/DL (ref 3.4–5)
ALBUMIN/GLOB SERPL: 1.3 {RATIO} (ref 1.1–2.2)
ALP SERPL-CCNC: 138 U/L (ref 40–129)
ALT SERPL-CCNC: 13 U/L (ref 10–40)
ANION GAP SERPL CALCULATED.3IONS-SCNC: 15 MMOL/L (ref 3–16)
AST SERPL-CCNC: 16 U/L (ref 15–37)
BASOPHILS # BLD: 0 K/UL (ref 0–0.2)
BASOPHILS NFR BLD: 0.4 %
BILIRUB SERPL-MCNC: <0.2 MG/DL (ref 0–1)
BUN SERPL-MCNC: 47 MG/DL (ref 7–20)
CALCIUM SERPL-MCNC: 9 MG/DL (ref 8.3–10.6)
CHLORIDE SERPL-SCNC: 92 MMOL/L (ref 99–110)
CO2 SERPL-SCNC: 27 MMOL/L (ref 21–32)
CREAT SERPL-MCNC: 7.7 MG/DL (ref 0.9–1.3)
DEPRECATED RDW RBC AUTO: 14.8 % (ref 12.4–15.4)
EOSINOPHIL # BLD: 0.1 K/UL (ref 0–0.6)
EOSINOPHIL NFR BLD: 2.1 %
GFR SERPLBLD CREATININE-BSD FMLA CKD-EPI: 8 ML/MIN/{1.73_M2}
GLUCOSE SERPL-MCNC: 156 MG/DL (ref 70–99)
HCT VFR BLD AUTO: 42 % (ref 40.5–52.5)
HGB BLD-MCNC: 13.5 G/DL (ref 13.5–17.5)
LACTATE BLDV-SCNC: 1.8 MMOL/L (ref 0.4–1.9)
LIPASE SERPL-CCNC: 27 U/L (ref 13–60)
LYMPHOCYTES # BLD: 0.9 K/UL (ref 1–5.1)
LYMPHOCYTES NFR BLD: 17.1 %
MCH RBC QN AUTO: 27.8 PG (ref 26–34)
MCHC RBC AUTO-ENTMCNC: 32.1 G/DL (ref 31–36)
MCV RBC AUTO: 86.5 FL (ref 80–100)
MONOCYTES # BLD: 0.3 K/UL (ref 0–1.3)
MONOCYTES NFR BLD: 6.1 %
NEUTROPHILS # BLD: 3.7 K/UL (ref 1.7–7.7)
NEUTROPHILS NFR BLD: 74.3 %
PLATELET # BLD AUTO: 143 K/UL (ref 135–450)
PMV BLD AUTO: 10.4 FL (ref 5–10.5)
POTASSIUM SERPL-SCNC: 3.7 MMOL/L (ref 3.5–5.1)
PROCALCITONIN SERPL IA-MCNC: 0.28 NG/ML (ref 0–0.15)
PROT SERPL-MCNC: 6.8 G/DL (ref 6.4–8.2)
RBC # BLD AUTO: 4.85 M/UL (ref 4.2–5.9)
SODIUM SERPL-SCNC: 134 MMOL/L (ref 136–145)
WBC # BLD AUTO: 5 K/UL (ref 4–11)

## 2024-11-10 PROCEDURE — 80053 COMPREHEN METABOLIC PANEL: CPT

## 2024-11-10 PROCEDURE — 87040 BLOOD CULTURE FOR BACTERIA: CPT

## 2024-11-10 PROCEDURE — 84484 ASSAY OF TROPONIN QUANT: CPT

## 2024-11-10 PROCEDURE — 87077 CULTURE AEROBIC IDENTIFY: CPT

## 2024-11-10 PROCEDURE — 99285 EMERGENCY DEPT VISIT HI MDM: CPT

## 2024-11-10 PROCEDURE — 87205 SMEAR GRAM STAIN: CPT

## 2024-11-10 PROCEDURE — 83690 ASSAY OF LIPASE: CPT

## 2024-11-10 PROCEDURE — 96374 THER/PROPH/DIAG INJ IV PUSH: CPT

## 2024-11-10 PROCEDURE — 74176 CT ABD & PELVIS W/O CONTRAST: CPT

## 2024-11-10 PROCEDURE — 85025 COMPLETE CBC W/AUTO DIFF WBC: CPT

## 2024-11-10 PROCEDURE — 84145 PROCALCITONIN (PCT): CPT

## 2024-11-10 PROCEDURE — 6360000002 HC RX W HCPCS: Performed by: NURSE PRACTITIONER

## 2024-11-10 PROCEDURE — 87070 CULTURE OTHR SPECIMN AEROBIC: CPT

## 2024-11-10 PROCEDURE — 83605 ASSAY OF LACTIC ACID: CPT

## 2024-11-10 PROCEDURE — 89051 BODY FLUID CELL COUNT: CPT

## 2024-11-10 RX ORDER — HYDROMORPHONE HYDROCHLORIDE 1 MG/ML
1 INJECTION, SOLUTION INTRAMUSCULAR; INTRAVENOUS; SUBCUTANEOUS ONCE
Status: COMPLETED | OUTPATIENT
Start: 2024-11-10 | End: 2024-11-10

## 2024-11-10 RX ADMIN — HYDROMORPHONE HYDROCHLORIDE 1 MG: 1 INJECTION, SOLUTION INTRAMUSCULAR; INTRAVENOUS; SUBCUTANEOUS at 22:29

## 2024-11-10 ASSESSMENT — PAIN DESCRIPTION - FREQUENCY: FREQUENCY: CONTINUOUS

## 2024-11-10 ASSESSMENT — PAIN DESCRIPTION - ORIENTATION: ORIENTATION: MID

## 2024-11-10 ASSESSMENT — PAIN SCALES - GENERAL: PAINLEVEL_OUTOF10: 8

## 2024-11-10 ASSESSMENT — PAIN DESCRIPTION - DESCRIPTORS: DESCRIPTORS: CRAMPING;CRUSHING;SHOOTING

## 2024-11-10 ASSESSMENT — PAIN DESCRIPTION - PAIN TYPE: TYPE: ACUTE PAIN

## 2024-11-10 ASSESSMENT — PAIN - FUNCTIONAL ASSESSMENT
PAIN_FUNCTIONAL_ASSESSMENT: PREVENTS OR INTERFERES SOME ACTIVE ACTIVITIES AND ADLS
PAIN_FUNCTIONAL_ASSESSMENT: 0-10

## 2024-11-10 ASSESSMENT — PAIN DESCRIPTION - LOCATION: LOCATION: ABDOMEN

## 2024-11-11 ENCOUNTER — APPOINTMENT (OUTPATIENT)
Dept: ULTRASOUND IMAGING | Age: 59
DRG: 867 | End: 2024-11-11
Payer: MEDICARE

## 2024-11-11 PROBLEM — N25.81 SECONDARY HYPERPARATHYROIDISM (HCC): Status: ACTIVE | Noted: 2024-11-11

## 2024-11-11 PROBLEM — K65.9 PERITONITIS (HCC): Status: ACTIVE | Noted: 2024-11-11

## 2024-11-11 LAB
ALBUMIN SERPL-MCNC: 3 G/DL (ref 3.4–5)
ALBUMIN/GLOB SERPL: 1.1 {RATIO} (ref 1.1–2.2)
ALP SERPL-CCNC: 112 U/L (ref 40–129)
ALT SERPL-CCNC: 9 U/L (ref 10–40)
ANION GAP SERPL CALCULATED.3IONS-SCNC: 14 MMOL/L (ref 3–16)
APPEARANCE FLUID: NORMAL
AST SERPL-CCNC: 10 U/L (ref 15–37)
BASOPHILS # BLD: 0 K/UL (ref 0–0.2)
BASOPHILS NFR BLD: 0.2 %
BDY FLUID QUALITY: NORMAL
BILIRUB SERPL-MCNC: 0.4 MG/DL (ref 0–1)
BUN SERPL-MCNC: 50 MG/DL (ref 7–20)
CALCIUM SERPL-MCNC: 8.1 MG/DL (ref 8.3–10.6)
CELL COUNT FLUID TYPE: NORMAL
CHLORIDE SERPL-SCNC: 94 MMOL/L (ref 99–110)
CO2 SERPL-SCNC: 25 MMOL/L (ref 21–32)
COLOR FLUID: NORMAL
CREAT SERPL-MCNC: 8.6 MG/DL (ref 0.9–1.3)
DEPRECATED RDW RBC AUTO: 15.5 % (ref 12.4–15.4)
EKG ATRIAL RATE: 93 BPM
EKG DIAGNOSIS: NORMAL
EKG P AXIS: 44 DEGREES
EKG P-R INTERVAL: 136 MS
EKG Q-T INTERVAL: 390 MS
EKG QRS DURATION: 98 MS
EKG QTC CALCULATION (BAZETT): 484 MS
EKG R AXIS: 23 DEGREES
EKG T AXIS: 111 DEGREES
EKG VENTRICULAR RATE: 93 BPM
EOSINOPHIL # BLD: 0 K/UL (ref 0–0.6)
EOSINOPHIL NFR BLD: 0.3 %
EOSINOPHIL NFR FLD MANUAL: 1 %
GFR SERPLBLD CREATININE-BSD FMLA CKD-EPI: 7 ML/MIN/{1.73_M2}
GLUCOSE BLD-MCNC: 187 MG/DL (ref 70–99)
GLUCOSE BLD-MCNC: 204 MG/DL (ref 70–99)
GLUCOSE BLD-MCNC: 206 MG/DL (ref 70–99)
GLUCOSE BLD-MCNC: 215 MG/DL (ref 70–99)
GLUCOSE BLD-MCNC: 229 MG/DL (ref 70–99)
GLUCOSE SERPL-MCNC: 200 MG/DL (ref 70–99)
HCT VFR BLD AUTO: 38.4 % (ref 40.5–52.5)
HGB BLD-MCNC: 12.2 G/DL (ref 13.5–17.5)
LYMPHOCYTES # BLD: 0.6 K/UL (ref 1–5.1)
LYMPHOCYTES NFR BLD: 8.7 %
MACROPHAGES # FLD: 4 %
MCH RBC QN AUTO: 27.7 PG (ref 26–34)
MCHC RBC AUTO-ENTMCNC: 31.7 G/DL (ref 31–36)
MCV RBC AUTO: 87.4 FL (ref 80–100)
MONOCYTES # BLD: 0.6 K/UL (ref 0–1.3)
MONOCYTES NFR BLD: 8.6 %
MONOCYTES NFR FLD: 1 %
NEUTROPHIL, FLUID: 94 %
NEUTROPHILS # BLD: 6.1 K/UL (ref 1.7–7.7)
NEUTROPHILS NFR BLD: 82.2 %
NUC CELL # FLD: NORMAL /CUMM
PERFORMED ON: ABNORMAL
PLATELET # BLD AUTO: 123 K/UL (ref 135–450)
PMV BLD AUTO: 10.8 FL (ref 5–10.5)
POTASSIUM SERPL-SCNC: 3.6 MMOL/L (ref 3.5–5.1)
PROT SERPL-MCNC: 5.8 G/DL (ref 6.4–8.2)
RBC # BLD AUTO: 4.39 M/UL (ref 4.2–5.9)
RBC FLUID: 2900 /CUMM
SODIUM SERPL-SCNC: 133 MMOL/L (ref 136–145)
TOTAL CELLS COUNTED FLD: 100
TROPONIN, HIGH SENSITIVITY: 143 NG/L (ref 0–22)
TROPONIN, HIGH SENSITIVITY: 163 NG/L (ref 0–22)
VANCOMYCIN SERPL-MCNC: 27.8 UG/ML
WBC # BLD AUTO: 7.4 K/UL (ref 4–11)

## 2024-11-11 PROCEDURE — 84484 ASSAY OF TROPONIN QUANT: CPT

## 2024-11-11 PROCEDURE — 6360000002 HC RX W HCPCS: Performed by: INTERNAL MEDICINE

## 2024-11-11 PROCEDURE — 3E1M39Z IRRIGATION OF PERITONEAL CAVITY USING DIALYSATE, PERCUTANEOUS APPROACH: ICD-10-PCS | Performed by: INTERNAL MEDICINE

## 2024-11-11 PROCEDURE — 93005 ELECTROCARDIOGRAM TRACING: CPT | Performed by: INTERNAL MEDICINE

## 2024-11-11 PROCEDURE — 85025 COMPLETE CBC W/AUTO DIFF WBC: CPT

## 2024-11-11 PROCEDURE — 99223 1ST HOSP IP/OBS HIGH 75: CPT | Performed by: INTERNAL MEDICINE

## 2024-11-11 PROCEDURE — 97162 PT EVAL MOD COMPLEX 30 MIN: CPT

## 2024-11-11 PROCEDURE — 1200000000 HC SEMI PRIVATE

## 2024-11-11 PROCEDURE — 97530 THERAPEUTIC ACTIVITIES: CPT

## 2024-11-11 PROCEDURE — 80053 COMPREHEN METABOLIC PANEL: CPT

## 2024-11-11 PROCEDURE — 6370000000 HC RX 637 (ALT 250 FOR IP): Performed by: INTERNAL MEDICINE

## 2024-11-11 PROCEDURE — 6360000002 HC RX W HCPCS: Performed by: NURSE PRACTITIONER

## 2024-11-11 PROCEDURE — 36415 COLL VENOUS BLD VENIPUNCTURE: CPT

## 2024-11-11 PROCEDURE — 2580000003 HC RX 258: Performed by: NURSE PRACTITIONER

## 2024-11-11 PROCEDURE — 93010 ELECTROCARDIOGRAM REPORT: CPT | Performed by: INTERNAL MEDICINE

## 2024-11-11 PROCEDURE — 76705 ECHO EXAM OF ABDOMEN: CPT

## 2024-11-11 PROCEDURE — 2580000003 HC RX 258: Performed by: INTERNAL MEDICINE

## 2024-11-11 PROCEDURE — 80202 ASSAY OF VANCOMYCIN: CPT

## 2024-11-11 PROCEDURE — 97165 OT EVAL LOW COMPLEX 30 MIN: CPT

## 2024-11-11 RX ORDER — HYDROMORPHONE HYDROCHLORIDE 1 MG/ML
0.5 INJECTION, SOLUTION INTRAMUSCULAR; INTRAVENOUS; SUBCUTANEOUS
Status: DISCONTINUED | OUTPATIENT
Start: 2024-11-11 | End: 2024-11-14 | Stop reason: HOSPADM

## 2024-11-11 RX ORDER — LEVETIRACETAM 500 MG/1
500 TABLET ORAL DAILY
Status: DISCONTINUED | OUTPATIENT
Start: 2024-11-11 | End: 2024-11-14 | Stop reason: HOSPADM

## 2024-11-11 RX ORDER — 0.9 % SODIUM CHLORIDE 0.9 %
500 INTRAVENOUS SOLUTION INTRAVENOUS ONCE
Status: DISCONTINUED | OUTPATIENT
Start: 2024-11-11 | End: 2024-11-14 | Stop reason: HOSPADM

## 2024-11-11 RX ORDER — SODIUM CHLORIDE 0.9 % (FLUSH) 0.9 %
10 SYRINGE (ML) INJECTION PRN
Status: DISCONTINUED | OUTPATIENT
Start: 2024-11-11 | End: 2024-11-14 | Stop reason: HOSPADM

## 2024-11-11 RX ORDER — ROSUVASTATIN CALCIUM 10 MG/1
10 TABLET, COATED ORAL EVERY EVENING
Status: DISCONTINUED | OUTPATIENT
Start: 2024-11-11 | End: 2024-11-14 | Stop reason: HOSPADM

## 2024-11-11 RX ORDER — ACETAMINOPHEN 650 MG/1
650 SUPPOSITORY RECTAL EVERY 6 HOURS PRN
Status: DISCONTINUED | OUTPATIENT
Start: 2024-11-11 | End: 2024-11-14 | Stop reason: HOSPADM

## 2024-11-11 RX ORDER — CLOPIDOGREL BISULFATE 75 MG/1
75 TABLET ORAL DAILY
Status: DISCONTINUED | OUTPATIENT
Start: 2024-11-11 | End: 2024-11-14 | Stop reason: HOSPADM

## 2024-11-11 RX ORDER — FLUCONAZOLE 100 MG/1
100 TABLET ORAL DAILY
Status: DISCONTINUED | OUTPATIENT
Start: 2024-11-11 | End: 2024-11-14 | Stop reason: HOSPADM

## 2024-11-11 RX ORDER — HYDROMORPHONE HYDROCHLORIDE 1 MG/ML
1 INJECTION, SOLUTION INTRAMUSCULAR; INTRAVENOUS; SUBCUTANEOUS PRN
Status: COMPLETED | OUTPATIENT
Start: 2024-11-11 | End: 2024-11-11

## 2024-11-11 RX ORDER — SODIUM CHLORIDE 0.9 % (FLUSH) 0.9 %
10 SYRINGE (ML) INJECTION EVERY 12 HOURS SCHEDULED
Status: DISCONTINUED | OUTPATIENT
Start: 2024-11-11 | End: 2024-11-14 | Stop reason: HOSPADM

## 2024-11-11 RX ORDER — 0.9 % SODIUM CHLORIDE 0.9 %
500 INTRAVENOUS SOLUTION INTRAVENOUS ONCE
Status: DISCONTINUED | OUTPATIENT
Start: 2024-11-11 | End: 2024-11-11 | Stop reason: RX

## 2024-11-11 RX ORDER — ASPIRIN 81 MG/1
81 TABLET ORAL DAILY
Status: DISCONTINUED | OUTPATIENT
Start: 2024-11-11 | End: 2024-11-14 | Stop reason: HOSPADM

## 2024-11-11 RX ORDER — PROMETHAZINE HYDROCHLORIDE 25 MG/1
12.5 TABLET ORAL EVERY 6 HOURS PRN
Status: DISCONTINUED | OUTPATIENT
Start: 2024-11-11 | End: 2024-11-14 | Stop reason: HOSPADM

## 2024-11-11 RX ORDER — HYDROMORPHONE HYDROCHLORIDE 1 MG/ML
1 INJECTION, SOLUTION INTRAMUSCULAR; INTRAVENOUS; SUBCUTANEOUS ONCE
Status: COMPLETED | OUTPATIENT
Start: 2024-11-11 | End: 2024-11-11

## 2024-11-11 RX ORDER — HYDROMORPHONE HYDROCHLORIDE 1 MG/ML
1 INJECTION, SOLUTION INTRAMUSCULAR; INTRAVENOUS; SUBCUTANEOUS ONCE
Status: DISCONTINUED | OUTPATIENT
Start: 2024-11-11 | End: 2024-11-11

## 2024-11-11 RX ORDER — DEXTROSE MONOHYDRATE 100 MG/ML
INJECTION, SOLUTION INTRAVENOUS CONTINUOUS PRN
Status: DISCONTINUED | OUTPATIENT
Start: 2024-11-11 | End: 2024-11-14 | Stop reason: HOSPADM

## 2024-11-11 RX ORDER — NICOTINE 21 MG/24HR
1 PATCH, TRANSDERMAL 24 HOURS TRANSDERMAL DAILY PRN
Status: DISCONTINUED | OUTPATIENT
Start: 2024-11-11 | End: 2024-11-11

## 2024-11-11 RX ORDER — SODIUM CHLORIDE 9 MG/ML
INJECTION, SOLUTION INTRAVENOUS PRN
Status: DISCONTINUED | OUTPATIENT
Start: 2024-11-11 | End: 2024-11-14 | Stop reason: HOSPADM

## 2024-11-11 RX ORDER — HYDROMORPHONE HYDROCHLORIDE 1 MG/ML
1 INJECTION, SOLUTION INTRAMUSCULAR; INTRAVENOUS; SUBCUTANEOUS
Status: DISCONTINUED | OUTPATIENT
Start: 2024-11-11 | End: 2024-11-14 | Stop reason: HOSPADM

## 2024-11-11 RX ORDER — LANOLIN ALCOHOL/MO/W.PET/CERES
3 CREAM (GRAM) TOPICAL NIGHTLY PRN
Status: DISCONTINUED | OUTPATIENT
Start: 2024-11-11 | End: 2024-11-14 | Stop reason: HOSPADM

## 2024-11-11 RX ORDER — ISOSORBIDE MONONITRATE 30 MG/1
30 TABLET, EXTENDED RELEASE ORAL NIGHTLY
Status: DISCONTINUED | OUTPATIENT
Start: 2024-11-11 | End: 2024-11-14 | Stop reason: HOSPADM

## 2024-11-11 RX ORDER — ACETAMINOPHEN 325 MG/1
650 TABLET ORAL EVERY 6 HOURS PRN
Status: DISCONTINUED | OUTPATIENT
Start: 2024-11-11 | End: 2024-11-14 | Stop reason: HOSPADM

## 2024-11-11 RX ORDER — SODIUM CHLORIDE 9 MG/ML
INJECTION, SOLUTION INTRAVENOUS
Status: DISPENSED
Start: 2024-11-11 | End: 2024-11-12

## 2024-11-11 RX ORDER — VANCOMYCIN HYDROCHLORIDE 1.5 G/300ML
1500 INJECTION, SOLUTION INTRAVITREAL ONCE
Status: COMPLETED | OUTPATIENT
Start: 2024-11-11 | End: 2024-11-11

## 2024-11-11 RX ORDER — ONDANSETRON 2 MG/ML
4 INJECTION INTRAMUSCULAR; INTRAVENOUS EVERY 6 HOURS PRN
Status: DISCONTINUED | OUTPATIENT
Start: 2024-11-11 | End: 2024-11-14 | Stop reason: HOSPADM

## 2024-11-11 RX ORDER — GLUCAGON 1 MG/ML
1 KIT INJECTION PRN
Status: DISCONTINUED | OUTPATIENT
Start: 2024-11-11 | End: 2024-11-14 | Stop reason: HOSPADM

## 2024-11-11 RX ORDER — CARVEDILOL 25 MG/1
25 TABLET ORAL 2 TIMES DAILY WITH MEALS
Status: DISCONTINUED | OUTPATIENT
Start: 2024-11-11 | End: 2024-11-12

## 2024-11-11 RX ADMIN — FLUCONAZOLE 100 MG: 100 TABLET ORAL at 16:59

## 2024-11-11 RX ADMIN — ONDANSETRON 4 MG: 2 INJECTION INTRAMUSCULAR; INTRAVENOUS at 12:01

## 2024-11-11 RX ADMIN — SODIUM CHLORIDE, PRESERVATIVE FREE 10 ML: 5 INJECTION INTRAVENOUS at 08:20

## 2024-11-11 RX ADMIN — ASPIRIN 81 MG: 81 TABLET, COATED ORAL at 08:17

## 2024-11-11 RX ADMIN — VANCOMYCIN HYDROCHLORIDE 1500 MG: 1.5 INJECTION, SOLUTION INTRAVITREAL at 01:31

## 2024-11-11 RX ADMIN — ROSUVASTATIN CALCIUM 10 MG: 10 TABLET, FILM COATED ORAL at 18:40

## 2024-11-11 RX ADMIN — HYDROMORPHONE HYDROCHLORIDE 1 MG: 1 INJECTION, SOLUTION INTRAMUSCULAR; INTRAVENOUS; SUBCUTANEOUS at 07:06

## 2024-11-11 RX ADMIN — CARVEDILOL 25 MG: 25 TABLET, FILM COATED ORAL at 18:40

## 2024-11-11 RX ADMIN — CEFEPIME 1000 MG: 1 INJECTION, POWDER, FOR SOLUTION INTRAMUSCULAR; INTRAVENOUS at 20:21

## 2024-11-11 RX ADMIN — CARVEDILOL 25 MG: 25 TABLET, FILM COATED ORAL at 08:18

## 2024-11-11 RX ADMIN — HYDROMORPHONE HYDROCHLORIDE 1 MG: 1 INJECTION, SOLUTION INTRAMUSCULAR; INTRAVENOUS; SUBCUTANEOUS at 01:27

## 2024-11-11 RX ADMIN — SERTRALINE 50 MG: 50 TABLET, FILM COATED ORAL at 08:17

## 2024-11-11 RX ADMIN — MELATONIN TAB 3 MG 3 MG: 3 TAB at 03:08

## 2024-11-11 RX ADMIN — HYDROMORPHONE HYDROCHLORIDE 1 MG: 1 INJECTION, SOLUTION INTRAMUSCULAR; INTRAVENOUS; SUBCUTANEOUS at 11:51

## 2024-11-11 RX ADMIN — Medication 10 ML: at 08:20

## 2024-11-11 RX ADMIN — HYDROMORPHONE HYDROCHLORIDE 1 MG: 1 INJECTION, SOLUTION INTRAMUSCULAR; INTRAVENOUS; SUBCUTANEOUS at 21:54

## 2024-11-11 RX ADMIN — CEFEPIME 2000 MG: 2 INJECTION, POWDER, FOR SOLUTION INTRAVENOUS at 00:52

## 2024-11-11 RX ADMIN — LEVETIRACETAM 500 MG: 500 TABLET, FILM COATED ORAL at 03:08

## 2024-11-11 RX ADMIN — CLOPIDOGREL BISULFATE 75 MG: 75 TABLET ORAL at 08:17

## 2024-11-11 ASSESSMENT — PAIN DESCRIPTION - ORIENTATION
ORIENTATION: MID
ORIENTATION: RIGHT

## 2024-11-11 ASSESSMENT — ENCOUNTER SYMPTOMS
BACK PAIN: 0
SINUS PAIN: 0
VOMITING: 0
RHINORRHEA: 0
ABDOMINAL PAIN: 1
EYE DISCHARGE: 0
CONSTIPATION: 0
COUGH: 0
DIARRHEA: 0
EYE REDNESS: 0
SORE THROAT: 0
SHORTNESS OF BREATH: 0
SINUS PRESSURE: 0
NAUSEA: 1
WHEEZING: 0
NAUSEA: 0

## 2024-11-11 ASSESSMENT — PAIN SCALES - GENERAL
PAINLEVEL_OUTOF10: 4
PAINLEVEL_OUTOF10: 3
PAINLEVEL_OUTOF10: 8
PAINLEVEL_OUTOF10: 3
PAINLEVEL_OUTOF10: 8
PAINLEVEL_OUTOF10: 10

## 2024-11-11 ASSESSMENT — PAIN DESCRIPTION - LOCATION
LOCATION: ABDOMEN

## 2024-11-11 ASSESSMENT — PAIN SCALES - WONG BAKER
WONGBAKER_NUMERICALRESPONSE: HURTS A LITTLE BIT

## 2024-11-11 ASSESSMENT — PAIN DESCRIPTION - DESCRIPTORS
DESCRIPTORS: CRAMPING
DESCRIPTORS: ACHING;CRAMPING
DESCRIPTORS: ACHING;CRAMPING
DESCRIPTORS: ACHING;SHARP

## 2024-11-11 ASSESSMENT — PAIN DESCRIPTION - FREQUENCY: FREQUENCY: CONTINUOUS

## 2024-11-11 ASSESSMENT — PAIN DESCRIPTION - ONSET: ONSET: ON-GOING

## 2024-11-11 ASSESSMENT — PAIN DESCRIPTION - PAIN TYPE: TYPE: ACUTE PAIN

## 2024-11-11 ASSESSMENT — PAIN - FUNCTIONAL ASSESSMENT: PAIN_FUNCTIONAL_ASSESSMENT: ACTIVITIES ARE NOT PREVENTED

## 2024-11-11 NOTE — PROGRESS NOTES
4 Eyes Skin Assessment     NAME:  Te Garay  YOB: 1965  MEDICAL RECORD NUMBER:  [id number]    The patient is being assessed for  Admission    I agree that at least one RN has performed a thorough Head to Toe Skin Assessment on the patient. ALL assessment sites listed below have been assessed.      Areas assessed by both nurses:    Head, Face, Ears, Shoulders, Back, Chest, Arms, Elbows, Hands, Sacrum. Buttock, Coccyx, Ischium, Legs. Feet and Heels, and Under Medical Devices         Does the Patient have a Wound? Yes wound(s) were present on assessment. LDA wound assessment was Initiated and completed by RN       Paul Prevention initiated by RN: Yes  Wound Care Orders initiated by RN: Yes    Pressure Injury (Stage 3,4, Unstageable, DTI, NWPT, and Complex wounds) if present, place Wound referral order by RN under : Yes    New Ostomies, if present place, Ostomy referral order under : Yes     Nurse 1 eSignature: Electronically signed by Romi Garvey RN on 11/11/24 at 4:21 AM EST    **SHARE this note so that the co-signing nurse can place an eSignature**    Nurse 2 eSignature: Electronically signed by Cipriano Ann RN on 11/11/24 at 6:04 AM EST

## 2024-11-11 NOTE — PROGRESS NOTES
Assessment completed, see doc flowsheets. Pt is A&&O X4. Lung sounds are clear. VSS. Tele on. Medication given per MAR. Patient has no needs at this time. Call light within in reach, will continue to monitor.

## 2024-11-11 NOTE — CONSULTS
swelling and redness or purulence at vascular access sites.     I/v access Management:    Continue to monitor i.v access sites for erythema, induration, discharge or tenderness.   As always, continue efforts to minimize tubes/lines/drains as clinically appropriate to reduce chances of line associated infections.    Patient education and counseling:        The patient was educated in detail about the side-effects of various antibiotics and things to watch for like new rashes, lip swelling, severe reaction, worsening diarrhea, break through fever etc.  Discussed patient's condition and what to expect. All of the patient's questions were addressed in a satisfactory manner and patient verbalized understanding all instructions.      Level of complexity of visit and medical decision making: High     Risk of Complications/Morbidity: High     Illness(es)/ Infection present that pose threat to life/bodily function.   There is potential for severe exacerbation of infection/side effects of treatment.  Therapy requires intensive monitoring for antimicrobial agent toxicity.     Thank you for involving me in the care of your patient. I will continue to follow. If you have anyadditional questions, please do not hesitate to contact me.     Subjective:     Presenting complaint in ER:     Chief Complaint   Patient presents with    Abdominal Pain        HPI: Te Garay is a 59 y.o. male patient, who was seen at the request of Dakotah Murphy MD.    History was obtained from chart review and the patient.       The patient was admitted on 11/10/2024. I have been consulted to see the patient for above mentioned reason(s).    The patient has multiple medical comorbidities, and presented to the ER for concern for peritonitis.    Patient has end-stage renal disease and does peritoneal dialysis at home.  He started having abdominal pain symptoms on the day of presentation to the ER.  The symptoms were acute in onset, worsening in  at [facility] CARDIAC CATH LAB    CARDIAC SURGERY  2001    triple bypass at     CAROTID STENT PLACEMENT Right     CORONARY ARTERY BYPASS GRAFT      FOOT DEBRIDEMENT Left 10/16/2023    LEFT FOOT DEBRIDEMENT INCISION AND DRAINAGE WITH BONE BIOPSY performed by David Lowe DPM at [facility] ASC OR    FOOT DEBRIDEMENT Left 10/23/2023    LEFT FOOT INCISION AND DRAINAGE WITH DELAYED PRIMARY CLOSURE AND DERMAL GRAFT APPLICATION performed by David Lowe DPM at [facility] OR    HERNIA REPAIR      infant    LAPAROSCOPY INSERTION PERITONEAL CATHETER N/A 06/03/2020    LAPAROSCOPIC PLACEMENT OF PERITONEAL DIALYSIS  CATHETER performed by Tristen Palacios MD at [facility] OR    TONSILLECTOMY           Family History: All family history was reviewed today.        Problem Relation Age of Onset    Hypertension Mother     Seizures Mother         epilepsy    Coronary Art Dis Father     Diabetes Father     Heart Murmur Sister     Thyroid Disease Sister     Mult Sclerosis Brother     Arthritis Brother     Sleep Apnea Brother     Coronary Art Dis Paternal Grandmother     Diabetes Paternal Grandmother          Medications: All current and past medications were reviewed.    Medications Prior to Admission: Insulin Aspart, w/Niacinamide, (FIASP) 100 UNIT/ML SOLN, USE 30-50 UNITS DAILY VIA PUMP- Needs f/u for refills  Continuous Glucose Sensor (DEXCOM G7 SENSOR) MISC, Every 10 days  ranolazine (RANEXA) 500 MG extended release tablet, TAKE ONE TABLET BY MOUTH TWICE DAILY.  carvedilol (COREG) 12.5 MG tablet, Take 2 tablets by mouth 2 times daily (with meals)  sertraline (ZOLOFT) 50 MG tablet, Take 1 tablet by mouth daily  vitamin D (ERGOCALCIFEROL) 1.25 MG (08169 UT) CAPS capsule, Take 1 capsule by mouth once a week  sacubitril-valsartan (ENTRESTO) 49-51 MG per tablet, Take 1 tablet by mouth 2 times daily  isosorbide mononitrate (IMDUR) 30 MG extended release tablet, TAKE 1 TABLET BY MOUTH DAILY (Patient taking differently: Take 1 tablet by

## 2024-11-11 NOTE — PROGRESS NOTES
Clinical Pharmacy Note: Pharmacy to Dose Vancomycin    Te Garay is a 59 y.o. male started on Vancomycin for peritonitis; consult received from Dr. Quiñones to manage therapy. Also receiving the following antibiotics: cefepime.    Current Dosing: intermittent dosing in patient with PD dialysis.   Goal Trough Level: 15-20 mcg/mL    Random Level:     Latest Reference Range & Units 11/11/24 07:20   Vancomycin, Random ug/mL 27.8        Assessment/Plan:  Patient received Vancomycin 1500mg IVPB x1 11/11/24 @ 0131   Intravenous vancomycin in patient receiving peritoneal dialysis: subsequent doses (usually 10 to 15 mg/kg) should be administered based on attainment of goal serum concentrations.   Changes in regimen will be determined based on culture results, renal function, and clinical response.  Pharmacy will continue to monitor and adjust regimen as necessary.    Allergies:  Iodides, Shellfish-derived products, Lipitor [atorvastatin], and Morphine     Recent Labs     11/10/24  2152 11/11/24  0720   CREATININE 7.7* 8.6*       Recent Labs     11/10/24  2152 11/11/24  0720   WBC 5.0 7.4       Ht Readings from Last 1 Encounters:   11/10/24 1.727 m (5' 8\")        Wt Readings from Last 1 Encounters:   11/11/24 85.3 kg (188 lb)         Estimated Creatinine Clearance: 10 mL/min (A) (based on SCr of 8.6 mg/dL (HH)).    Thank you for allowing pharmacy to participate in the care of this patient.  Eunice Lopez, PharmD #[id number]

## 2024-11-11 NOTE — ED PROVIDER NOTES
[facility] ONCOLOGY  EMERGENCY DEPARTMENT ENCOUNTER        Pt Name: Te Garay  MRN: [id number]  Birthdate 1965  Date of evaluation: 11/10/2024  Provider: JAYLEN Alvarez - CNP  PCP: Reid Lei MD  Note Started: 10:08 PM EST 11/10/24      ILEANA. I have evaluated this patient.        CHIEF COMPLAINT       Chief Complaint   Patient presents with    Abdominal Pain       HISTORY OF PRESENT ILLNESS: 1 or more Elements     History from : Patient    Limitations to history : None    Te Garay is a 59 y.o. male who presents to the emergency department with concern of possible peritonitis.  The patient does do peritoneal dialysis at home, he did drain most of his peritoneal dialysate, states that it was cloudy and he has severe pain.  He rates his pain as 8 of 10, sharp.  No fevers.    Denies any headache, fever, lightheadedness, dizziness, visual disturbances.  No chest pain or pressure.  No neck or back pain.  No shortness of breath, cough, or congestion.  No vomiting, diarrhea, constipation, or dysuria.  No rash.    Nursing Notes were all reviewed and agreed with or any disagreements were addressed in the HPI.    REVIEW OF SYSTEMS :      Review of Systems   Constitutional:  Negative for activity change, chills and fever.   Respiratory:  Negative for chest tightness and shortness of breath.    Cardiovascular:  Negative for chest pain.   Gastrointestinal:  Positive for abdominal pain. Negative for diarrhea, nausea and vomiting.        Cloudy dialysate   Genitourinary:  Negative for dysuria.   All other systems reviewed and are negative.      Positives and Pertinent negatives as per HPI.     SURGICAL HISTORY     Past Surgical History:   Procedure Laterality Date    BLADDER SURGERY Left 08/24/2023    CYSTOSCOPY, LEFT URETEROSCOPY, STONE BASKET MANIPULATION, PLACEMENT OF LEFT URETERAL STENT performed by Chevy Sepulveda MD at [facility] OR    CARDIAC CATHETERIZATION      CARDIAC PROCEDURE N/A

## 2024-11-11 NOTE — H&P
Hospital Medicine History & Physical      PCP: Reid Lei MD    Date of Admission: 11/10/2024    Date of Service: Pt seen/examined on 11/10/2024    Pt seen/examined face to face on and admitted as inpatient with expected LOS to be two days but can change depending on diagnostic work up and treatment response.     Chief Complaint:    Chief Complaint   Patient presents with    Abdominal Pain            ASSESSMENT AND PLAN:    Active Hospital Problems    Diagnosis Date Noted    Peritonitis (HCC) [K65.9] 11/11/2024     Peritonitis and PD dialysis since renal disease:  IV Vanco and cefepime initiated in the ED  Continued for now  Nephrology consulted, appreciated    End-stage renal disease:  Nephrology consulted, appreciated     Essential Hypertension: Reviewed patient's medications and plan is to continue home medication  Type 1 Diabetes: Reviewed patient's medications. Plan is to continue with patient's insulin pump, pharmacy consulted for assistance  Hyperlipidemia: Controlled on home Statin. Outpatient PCP follow up post-discharge  Depression: continue home medications  Seizure: Continue medication  CAD: Currently controlled on home meds. No current symptoms or evidence of active signs of ischemia.       .Due to the above diagnosis makes the patient higher risk for morbidity and mortality requiring testing and treatment      Discussion with the primary ER physician in regards to symptoms, history, physical exam, diagnosis and treatment, collaborative decision was to admit the patient.    Diet: NPO except meds ordered    DVT Prophylaxis: lovenox    Dispo:   Expected LOS of two days      History Of Present Illness:      59 y.o. male who presented to [facility] with past medical history of cardiomyopathy, CKD, CAD, end-stage renal disease hypertension, hyperlipidemia, epilepsy, type 1 diabetes presented to the ED with chief complaints of abdominal pain    Patient reported abdominal pain started while he  the colon.                      Ishmael Pederson, DO

## 2024-11-11 NOTE — PROGRESS NOTES
[facility] - Inpatient Rehabilitation Department   Phone: [phone]    Physical Therapy    [x] Initial Evaluation            [] Daily Treatment Note         [] Discharge Summary      Patient: Te Garay   : 1965   MRN: [id number]   Date of Service:  2024  Admitting Diagnosis: Peritonitis (HCC)  Current Admission Summary: 59-year-old gentleman with history of peripheral vascular disease, hypertension, CAD with cardiomyopathy, hyperlipidemia, diabetes, end-stage renal disease on peritoneal dialysis who had a recent peritoneal dialysis infection and returns with abdominal pains and associated cloudy dialysate concerning for peritonitis.   Past Medical History:  has a past medical history of Angina at rest ([facility]), Cardiomyopathy (HCC), Carotid artery stenosis, Charcot foot due to diabetes mellitus (HCC), CHF (congestive heart failure) ([facility]), CKD (chronic kidney disease) stage 2, GFR 60-89 ml/min, Coronary artery disease, Coronary artery disease involving native heart with angina pectoris ([facility]), De Quervain thyroiditis, Diabetic peripheral neuropathy ([facility]), Diabetic polyneuropathy associated with type 1 diabetes mellitus ([facility]), Diastolic HF (heart failure) ([facility]), Essential hypertension, History of COVID-19, Hyperlipidemia, Hyperlipidemia with target LDL less than 70, Hyperparathyroidism due to renal insufficiency (HCC), Hypertension goal BP (blood pressure) < 130/80, Peritonitis associated with peritoneal dialysis ([facility]), PVD (peripheral vascular disease) ([facility]), S/P CABG (coronary artery bypass graft), Seizure disorder ([facility]), Seizures ([facility]), Stenosis of carotid artery, Toe osteomyelitis, left, and Type 1 diabetes mellitus with chronic kidney disease ([facility]).  Past Surgical History:  has a past surgical history that includes Cardiac surgery (); Coronary artery bypass graft; Cardiac catheterization; hernia repair; Tonsillectomy; LAPAROSCOPY INSERTION PERITONEAL CATHETER (N/A, 2020);  Carotid stent placement (Right); Bladder surgery (Left, 08/24/2023); Foot Debridement (Left, 10/16/2023); Foot Debridement (Left, 10/23/2023); Cardiac procedure (N/A, 6/27/2024); Cardiac procedure (N/A, 7/31/2024); Cardiac procedure (N/A, 7/31/2024); and Cardiac procedure (N/A, 7/31/2024).  Discharge Recommendations: Te Garay scored a 19/24 on the AM-PAC short mobility form. Current research shows that an AM-PAC score of 18 or greater is typically associated with a discharge to the patient's home setting. Based on the patient's AM-PAC score and their current functional mobility deficits, it is recommended that the patient have 2-3 sessions per week of Physical Therapy at d/c to increase the patient's independence.  At this time, this patient demonstrates the endurance and safety to discharge home with HHPT and a follow up treatment frequency of 2-3x/wk.  Please see assessment section for further patient specific details.  HOME HEALTH CARE: LEVEL 1 STANDARD    - Initial home health evaluation to occur within 24-48 hours, in patient home   - Therapy to evaluate with goal of regaining prior level of functioning   - Therapy to evaluate if patient has Home Health Aide needs for personal care     If patient discharges prior to next session this note will serve as a discharge summary.  Please see below for the latest assessment towards goals.     DME Required For Discharge: patient has all required DME for discharge  Precautions/Restrictions: high fall risk, modified diet, NPO for a test  Weight Bearing Restrictions:  Heel WB on the L per Pt. D/t chronic wound on the L lateral foot  [] Right Upper Extremity  [] Left Upper Extremity [] Right Lower Extremity  [] Left Lower Extremity     Required Braces/Orthotics: surgical shoe   [] Right  [x] Left  Positional Restrictions:no positional restrictions    Pre-Admission Information   Lives With: spouse, son, daughter                  Type of Home: house  Home Layout: two

## 2024-11-11 NOTE — PROGRESS NOTES
[facility] - Inpatient Rehabilitation Department   Phone: [phone]    Occupational Therapy    [x] Initial Evaluation            [] Daily Treatment Note         [] Discharge Summary      Patient: Te Garay   : 1965   MRN: [id number]   Date of Service:  2024    Admitting Diagnosis:  Peritonitis (HCC)  Current Admission Summary: 59-year-old gentleman with history of peripheral vascular disease, hypertension, CAD with cardiomyopathy, hyperlipidemia, diabetes, end-stage renal disease on peritoneal dialysis who had a recent peritoneal dialysis infection and returns with abdominal pains and associated cloudy dialysate concerning for peritonitis.   Past Medical History:  has a past medical history of Angina at rest ([facility]), Cardiomyopathy (HCC), Carotid artery stenosis, Charcot foot due to diabetes mellitus ([facility]), CHF (congestive heart failure) ([facility]), CKD (chronic kidney disease) stage 2, GFR 60-89 ml/min, Coronary artery disease, Coronary artery disease involving native heart with angina pectoris ([facility]), De Quervain thyroiditis, Diabetic peripheral neuropathy ([facility]), Diabetic polyneuropathy associated with type 1 diabetes mellitus ([facility]), Diastolic HF (heart failure) ([facility]), Essential hypertension, History of COVID-19, Hyperlipidemia, Hyperlipidemia with target LDL less than 70, Hyperparathyroidism due to renal insufficiency (HCC), Hypertension goal BP (blood pressure) < 130/80, Peritonitis associated with peritoneal dialysis ([facility]), PVD (peripheral vascular disease) ([facility]), S/P CABG (coronary artery bypass graft), Seizure disorder ([facility]), Seizures ([facility]), Stenosis of carotid artery, Toe osteomyelitis, left, and Type 1 diabetes mellitus with chronic kidney disease ([facility]).  Past Surgical History:  has a past surgical history that includes Cardiac surgery (); Coronary artery bypass graft; Cardiac catheterization; hernia repair; Tonsillectomy; LAPAROSCOPY INSERTION PERITONEAL CATHETER (N/A,

## 2024-11-11 NOTE — PROGRESS NOTES
Hospitalist Progress Note      PCP: Reid Lei MD    Date of Admission: 11/10/2024    LOS: 0    Chief Complaint:   Chief Complaint   Patient presents with    Abdominal Pain       Case Summary:   59-year-old gentleman with history of peripheral vascular disease, hypertension, CAD with cardiomyopathy, hyperlipidemia, diabetes, end-stage renal disease on peritoneal dialysis who had a recent peritoneal dialysis infection and returns with abdominal pains and associated cloudy dialysate concerning for peritonitis.  CT abdomen pelvis noted for diffuse peritoneal stranding suggestive for peritonitis  - Late fluid analysis with PMN> 26,000      Active Hospital Problems    Diagnosis Date Noted    Peritonitis (HCC) [K65.9] 11/11/2024    CAD (coronary artery disease) [I25.10] 07/28/2024    Cardiomyopathy (HCC) [I42.9] 07/17/2024    Seizure disorder (HCC) [G40.909] 11/20/2023    Generalized abdominal pain [R10.84]     ESRD on peritoneal dialysis (HCC) [N18.6, Z99.2]     HTN (hypertension), benign [I10] 01/12/2019    HLD (hyperlipidemia) [E78.5]          Principal Problem:    Peritonitis with peritoneal dialysis and Generalized abdominal pain: Recurrent peritonitis.  With significant abdominal pain.  Remains afebrile with no leukocytosis.  - Continue empiric antibiotic coverage  - Follow-up desolate cultures  - Nephrology consult  - ID consult  - Continue pain management      Active Problems:    HLD (hyperlipidemia): On statin    ESRD on peritoneal dialysis (HCC): Nephrology consult for dialysis plans    Type 1 diabetes: On insulin pump    Seizure disorder (HCC): Stable on Keppra    HTN (hypertension), benign: Stable on isosorbide mononitrate and Coreg    CAD (coronary artery disease) with Cardiomyopathy: No chest pains or shortness of breath.  Continue aspirin, Coreg, clopidogrel, isosorbide mononitrate, rosuvastatin  Anxiety depression: Stable on sertraline        Medications:  Reviewed  Infusion Medications

## 2024-11-11 NOTE — CONSULTS
Nephrology Consult Note  [phone]   General Sentiment           Reason for Consult: PD related peritonitis, ESRD    HISTORY OF PRESENT ILLNESS:                The patient is a 59 y.o.male with significant past medical history of ESRD on PD, CAD/CABG/CHF, carotid artery stenosis, DM 2, diabetic neuropathy, hypertension, hyperlipidemia, seizure disorder came in with complaints of abdominal pain, nausea for the past 2 to 3 days.  He was recently just treated for PD related peritonitis.  PD fluid cultures at that time were positive for Staph epidermidis, was initially treated with cefepime and vancomycin but was discharged on vancomycin IP  Discussed with charge RN at the out-pt dialysis unit  He has completed IP Vancomycin on 10/25/24, cell count and cultures done on 10/22/24 after the completion of ABx course were not suggestive of peritonitis   His PD fluid currently is consistent with peritonitis and has been on broad-spectrum IV antibiotics including cefepime and vancomycin  Patient seen and examined  Abdominal pain persistent  Nausea+, no emesis  No fevers/chills     Past Medical History:        Diagnosis Date    Angina at rest ([facility])     Cardiomyopathy ([facility])     Carotid artery stenosis 10/25/2016    SUZANNA stented with 8 x 30 mm non-tapered Xact stent    Charcot foot due to diabetes mellitus ([facility]) 07/01/2024    CHF (congestive heart failure) ([facility]) 03/03/2015    EF 30%     CKD (chronic kidney disease) stage 2, GFR 60-89 ml/min     Coronary artery disease     sp CABG UC    Coronary artery disease involving native heart with angina pectoris ([facility]) 05/26/2010    Formatting of this note might be different from the original.   s/p CABG      Last Assessment & Plan:    Formatting of this note might be different from the original.   s/p PTCA and stent during 04/24 hosptialization.        Cardiology (Dr. Celestin)  has told him that he will get cardiac clearance, but he needs to have angioplasty at some point  PROCEDURE N/A 7/31/2024    Left heart cath / coronary angiography with PCI performed by Larry Marin MD at [facility] CARDIAC CATH LAB    CARDIAC PROCEDURE N/A 7/31/2024    Insert stent juan coronary bypass graft performed by Larry Marin MD at [facility] CARDIAC CATH LAB    CARDIAC PROCEDURE N/A 7/31/2024    Intravascular ultrasound performed by Larry Marin MD at [facility] CARDIAC CATH LAB    CARDIAC SURGERY  2001    triple bypass at     CAROTID STENT PLACEMENT Right     CORONARY ARTERY BYPASS GRAFT      FOOT DEBRIDEMENT Left 10/16/2023    LEFT FOOT DEBRIDEMENT INCISION AND DRAINAGE WITH BONE BIOPSY performed by David Lowe DPM at [facility] OR    FOOT DEBRIDEMENT Left 10/23/2023    LEFT FOOT INCISION AND DRAINAGE WITH DELAYED PRIMARY CLOSURE AND DERMAL GRAFT APPLICATION performed by David Lowe DPM at [facility] OR    HERNIA REPAIR      infant    LAPAROSCOPY INSERTION PERITONEAL CATHETER N/A 06/03/2020    LAPAROSCOPIC PLACEMENT OF PERITONEAL DIALYSIS  CATHETER performed by Tristen Palacios MD at [facility] OR    TONSILLECTOMY           Current Medications:    No current facility-administered medications on file prior to encounter.     Current Outpatient Medications on File Prior to Encounter   Medication Sig Dispense Refill    Insulin Aspart, w/Niacinamide, (FIASP) 100 UNIT/ML SOLN USE 30-50 UNITS DAILY VIA PUMP- Needs f/u for refills 20 mL 0    Continuous Glucose Sensor (DEXCOM G7 SENSOR) MISC Every 10 days 3 each 2    ranolazine (RANEXA) 500 MG extended release tablet TAKE ONE TABLET BY MOUTH TWICE DAILY. 180 tablet 2    carvedilol (COREG) 12.5 MG tablet Take 2 tablets by mouth 2 times daily (with meals) 30 tablet 0    sertraline (ZOLOFT) 50 MG tablet Take 1 tablet by mouth daily 30 tablet 3    vitamin D (ERGOCALCIFEROL) 1.25 MG (61184 UT) CAPS capsule Take 1 capsule by mouth once a week      sacubitril-valsartan (ENTRESTO) 49-51 MG per tablet Take 1 tablet by mouth 2 times daily      isosorbide mononitrate

## 2024-11-11 NOTE — PROGRESS NOTES
Treatment initiated without complications or complaints from patient. Patient resting comfortably with VSS upon dialysis RN exit from room. Eunice Maddox, RN notified of start of treatment and hotline number located on top of machine for any questions about troubleshooting during after hours.

## 2024-11-11 NOTE — ED TRIAGE NOTES
Patient states he felt like he needed to have a bowel movement, but did not go. More pressure built up so he used the bathroom and the pressure got more intense.  The pressure feels like he did when he had peritonitis. Pt is peritoneal dialysis patient. Pt states he manually removed the fluid from his stomach and said it was cloudy.

## 2024-11-12 LAB
ALBUMIN SERPL-MCNC: 2.4 G/DL (ref 3.4–5)
ALBUMIN/GLOB SERPL: 0.8 {RATIO} (ref 1.1–2.2)
ALP SERPL-CCNC: 96 U/L (ref 40–129)
ALT SERPL-CCNC: 7 U/L (ref 10–40)
ANION GAP SERPL CALCULATED.3IONS-SCNC: 14 MMOL/L (ref 3–16)
APPEARANCE FLUID: NORMAL
AST SERPL-CCNC: 11 U/L (ref 15–37)
BACTERIA FLD AEROBE CULT: ABNORMAL
BACTERIA URNS QL MICRO: ABNORMAL /HPF
BASOPHILS # BLD: 0 K/UL (ref 0–0.2)
BASOPHILS NFR BLD: 0.2 %
BDY FLUID QUALITY: NORMAL
BILIRUB SERPL-MCNC: 0.3 MG/DL (ref 0–1)
BILIRUB UR QL STRIP.AUTO: NEGATIVE
BUN SERPL-MCNC: 50 MG/DL (ref 7–20)
CALCIUM SERPL-MCNC: 8 MG/DL (ref 8.3–10.6)
CELL COUNT FLUID TYPE: NORMAL
CHLORIDE SERPL-SCNC: 95 MMOL/L (ref 99–110)
CLARITY UR: ABNORMAL
CO2 SERPL-SCNC: 22 MMOL/L (ref 21–32)
COLOR FLUID: COLORLESS
COLOR UR: YELLOW
CREAT SERPL-MCNC: 8.5 MG/DL (ref 0.9–1.3)
DEPRECATED RDW RBC AUTO: 15.3 % (ref 12.4–15.4)
EOSINOPHIL # BLD: 0.1 K/UL (ref 0–0.6)
EOSINOPHIL NFR BLD: 1.4 %
EPI CELLS #/AREA URNS AUTO: 6 /HPF (ref 0–5)
GFR SERPLBLD CREATININE-BSD FMLA CKD-EPI: 7 ML/MIN/{1.73_M2}
GLUCOSE BLD-MCNC: 112 MG/DL (ref 70–99)
GLUCOSE BLD-MCNC: 147 MG/DL (ref 70–99)
GLUCOSE BLD-MCNC: 156 MG/DL (ref 70–99)
GLUCOSE BLD-MCNC: 209 MG/DL (ref 70–99)
GLUCOSE SERPL-MCNC: 263 MG/DL (ref 70–99)
GLUCOSE UR STRIP.AUTO-MCNC: 500 MG/DL
GRAM STN SPEC: ABNORMAL
HCT VFR BLD AUTO: 36.2 % (ref 40.5–52.5)
HGB BLD-MCNC: 11.3 G/DL (ref 13.5–17.5)
HGB UR QL STRIP.AUTO: NEGATIVE
HYALINE CASTS #/AREA URNS AUTO: 6 /LPF (ref 0–8)
KETONES UR STRIP.AUTO-MCNC: ABNORMAL MG/DL
LEUKOCYTE ESTERASE UR QL STRIP.AUTO: ABNORMAL
LYMPHOCYTES # BLD: 0.6 K/UL (ref 1–5.1)
LYMPHOCYTES NFR BLD: 16 %
LYMPHOCYTES NFR FLD: 2 %
MACROPHAGES # FLD: 11 %
MAGNESIUM SERPL-MCNC: 1.78 MG/DL (ref 1.8–2.4)
MCH RBC QN AUTO: 27.9 PG (ref 26–34)
MCHC RBC AUTO-ENTMCNC: 31.2 G/DL (ref 31–36)
MCV RBC AUTO: 89.4 FL (ref 80–100)
MONOCYTES # BLD: 0.3 K/UL (ref 0–1.3)
MONOCYTES NFR BLD: 8.1 %
MONOCYTES NFR FLD: 0 %
NEUTROPHIL, FLUID: 87 %
NEUTROPHILS # BLD: 2.9 K/UL (ref 1.7–7.7)
NEUTROPHILS NFR BLD: 74.3 %
NITRITE UR QL STRIP.AUTO: NEGATIVE
NUC CELL # FLD: 1265 /CUMM
ORGANISM: ABNORMAL
PERFORMED ON: ABNORMAL
PH UR STRIP.AUTO: 5 [PH] (ref 5–8)
PLATELET # BLD AUTO: 114 K/UL (ref 135–450)
PMV BLD AUTO: 10.9 FL (ref 5–10.5)
POTASSIUM SERPL-SCNC: 3.2 MMOL/L (ref 3.5–5.1)
PROT SERPL-MCNC: 5.3 G/DL (ref 6.4–8.2)
PROT UR STRIP.AUTO-MCNC: 30 MG/DL
RBC # BLD AUTO: 4.04 M/UL (ref 4.2–5.9)
RBC CLUMPS #/AREA URNS AUTO: 1 /HPF (ref 0–4)
RBC FLUID: <1000 /CUMM
SODIUM SERPL-SCNC: 131 MMOL/L (ref 136–145)
SP GR UR STRIP.AUTO: >=1.03 (ref 1–1.03)
TOTAL CELLS COUNTED FLD: 100
UA COMPLETE W REFLEX CULTURE PNL UR: YES
UA DIPSTICK W REFLEX MICRO PNL UR: YES
URN SPEC COLLECT METH UR: ABNORMAL
UROBILINOGEN UR STRIP-ACNC: 1 E.U./DL
VANCOMYCIN SERPL-MCNC: 18 UG/ML
WBC # BLD AUTO: 3.9 K/UL (ref 4–11)
WBC #/AREA URNS AUTO: 20 /HPF (ref 0–5)

## 2024-11-12 PROCEDURE — 6370000000 HC RX 637 (ALT 250 FOR IP): Performed by: INTERNAL MEDICINE

## 2024-11-12 PROCEDURE — 83735 ASSAY OF MAGNESIUM: CPT

## 2024-11-12 PROCEDURE — 6370000000 HC RX 637 (ALT 250 FOR IP): Performed by: HOSPITALIST

## 2024-11-12 PROCEDURE — 89051 BODY FLUID CELL COUNT: CPT

## 2024-11-12 PROCEDURE — 90945 DIALYSIS ONE EVALUATION: CPT

## 2024-11-12 PROCEDURE — 81001 URINALYSIS AUTO W/SCOPE: CPT

## 2024-11-12 PROCEDURE — 2580000003 HC RX 258: Performed by: INTERNAL MEDICINE

## 2024-11-12 PROCEDURE — 6360000002 HC RX W HCPCS: Performed by: INTERNAL MEDICINE

## 2024-11-12 PROCEDURE — 87205 SMEAR GRAM STAIN: CPT

## 2024-11-12 PROCEDURE — 87086 URINE CULTURE/COLONY COUNT: CPT

## 2024-11-12 PROCEDURE — 85025 COMPLETE CBC W/AUTO DIFF WBC: CPT

## 2024-11-12 PROCEDURE — 1200000000 HC SEMI PRIVATE

## 2024-11-12 PROCEDURE — 80053 COMPREHEN METABOLIC PANEL: CPT

## 2024-11-12 PROCEDURE — 99233 SBSQ HOSP IP/OBS HIGH 50: CPT | Performed by: INTERNAL MEDICINE

## 2024-11-12 PROCEDURE — 87070 CULTURE OTHR SPECIMN AEROBIC: CPT

## 2024-11-12 PROCEDURE — 80202 ASSAY OF VANCOMYCIN: CPT

## 2024-11-12 PROCEDURE — 97530 THERAPEUTIC ACTIVITIES: CPT

## 2024-11-12 PROCEDURE — 36415 COLL VENOUS BLD VENIPUNCTURE: CPT

## 2024-11-12 RX ORDER — CARVEDILOL 6.25 MG/1
12.5 TABLET ORAL 2 TIMES DAILY WITH MEALS
Status: DISCONTINUED | OUTPATIENT
Start: 2024-11-12 | End: 2024-11-14 | Stop reason: HOSPADM

## 2024-11-12 RX ORDER — BUTALBITAL, ACETAMINOPHEN AND CAFFEINE 50; 325; 40 MG/1; MG/1; MG/1
1 TABLET ORAL EVERY 6 HOURS PRN
Status: DISCONTINUED | OUTPATIENT
Start: 2024-11-12 | End: 2024-11-14 | Stop reason: HOSPADM

## 2024-11-12 RX ORDER — RANOLAZINE 500 MG/1
500 TABLET, EXTENDED RELEASE ORAL 2 TIMES DAILY
Status: DISCONTINUED | OUTPATIENT
Start: 2024-11-12 | End: 2024-11-14 | Stop reason: HOSPADM

## 2024-11-12 RX ORDER — POTASSIUM CHLORIDE 1500 MG/1
40 TABLET, EXTENDED RELEASE ORAL ONCE
Status: COMPLETED | OUTPATIENT
Start: 2024-11-12 | End: 2024-11-12

## 2024-11-12 RX ADMIN — CLOPIDOGREL BISULFATE 75 MG: 75 TABLET ORAL at 08:19

## 2024-11-12 RX ADMIN — RANOLAZINE 500 MG: 500 TABLET, EXTENDED RELEASE ORAL at 14:00

## 2024-11-12 RX ADMIN — POTASSIUM CHLORIDE 40 MEQ: 1500 TABLET, EXTENDED RELEASE ORAL at 11:38

## 2024-11-12 RX ADMIN — ISOSORBIDE MONONITRATE 30 MG: 30 TABLET, EXTENDED RELEASE ORAL at 21:39

## 2024-11-12 RX ADMIN — Medication 10 ML: at 21:55

## 2024-11-12 RX ADMIN — RANOLAZINE 500 MG: 500 TABLET, EXTENDED RELEASE ORAL at 21:39

## 2024-11-12 RX ADMIN — SERTRALINE 50 MG: 50 TABLET, FILM COATED ORAL at 08:19

## 2024-11-12 RX ADMIN — VANCOMYCIN HYDROCHLORIDE 500 MG: 500 INJECTION, POWDER, LYOPHILIZED, FOR SOLUTION INTRAVENOUS at 14:05

## 2024-11-12 RX ADMIN — HYDROMORPHONE HYDROCHLORIDE 1 MG: 1 INJECTION, SOLUTION INTRAMUSCULAR; INTRAVENOUS; SUBCUTANEOUS at 18:25

## 2024-11-12 RX ADMIN — HYDROMORPHONE HYDROCHLORIDE 1 MG: 1 INJECTION, SOLUTION INTRAMUSCULAR; INTRAVENOUS; SUBCUTANEOUS at 10:53

## 2024-11-12 RX ADMIN — HYDROMORPHONE HYDROCHLORIDE 1 MG: 1 INJECTION, SOLUTION INTRAMUSCULAR; INTRAVENOUS; SUBCUTANEOUS at 15:11

## 2024-11-12 RX ADMIN — HYDROMORPHONE HYDROCHLORIDE 1 MG: 1 INJECTION, SOLUTION INTRAMUSCULAR; INTRAVENOUS; SUBCUTANEOUS at 22:12

## 2024-11-12 RX ADMIN — ONDANSETRON 4 MG: 2 INJECTION INTRAMUSCULAR; INTRAVENOUS at 08:36

## 2024-11-12 RX ADMIN — HYDROMORPHONE HYDROCHLORIDE 1 MG: 1 INJECTION, SOLUTION INTRAMUSCULAR; INTRAVENOUS; SUBCUTANEOUS at 07:19

## 2024-11-12 RX ADMIN — MELATONIN TAB 3 MG 3 MG: 3 TAB at 00:47

## 2024-11-12 RX ADMIN — Medication 10 ML: at 08:20

## 2024-11-12 RX ADMIN — BUTALBITAL, ACETAMINOPHEN, AND CAFFEINE 1 TABLET: 50; 325; 40 TABLET ORAL at 14:00

## 2024-11-12 RX ADMIN — ASPIRIN 81 MG: 81 TABLET, COATED ORAL at 08:19

## 2024-11-12 RX ADMIN — HYDROMORPHONE HYDROCHLORIDE 0.5 MG: 1 INJECTION, SOLUTION INTRAMUSCULAR; INTRAVENOUS; SUBCUTANEOUS at 00:47

## 2024-11-12 RX ADMIN — LEVETIRACETAM 500 MG: 500 TABLET, FILM COATED ORAL at 08:19

## 2024-11-12 RX ADMIN — ROSUVASTATIN CALCIUM 10 MG: 10 TABLET, FILM COATED ORAL at 22:06

## 2024-11-12 RX ADMIN — FLUCONAZOLE 100 MG: 100 TABLET ORAL at 08:19

## 2024-11-12 RX ADMIN — CEFEPIME 1000 MG: 1 INJECTION, POWDER, FOR SOLUTION INTRAMUSCULAR; INTRAVENOUS at 21:45

## 2024-11-12 ASSESSMENT — ENCOUNTER SYMPTOMS
SHORTNESS OF BREATH: 0
COUGH: 0
DIARRHEA: 0
CONSTIPATION: 0
ABDOMINAL PAIN: 1
RHINORRHEA: 0
WHEEZING: 0
EYE REDNESS: 0
SINUS PRESSURE: 0
SINUS PAIN: 0
SORE THROAT: 0
EYE DISCHARGE: 0
BACK PAIN: 0
NAUSEA: 0

## 2024-11-12 ASSESSMENT — PAIN SCALES - GENERAL
PAINLEVEL_OUTOF10: 2
PAINLEVEL_OUTOF10: 5
PAINLEVEL_OUTOF10: 7
PAINLEVEL_OUTOF10: 7
PAINLEVEL_OUTOF10: 5
PAINLEVEL_OUTOF10: 2
PAINLEVEL_OUTOF10: 3
PAINLEVEL_OUTOF10: 7
PAINLEVEL_OUTOF10: 2
PAINLEVEL_OUTOF10: 4
PAINLEVEL_OUTOF10: 7
PAINLEVEL_OUTOF10: 3
PAINLEVEL_OUTOF10: 2
PAINLEVEL_OUTOF10: 5
PAINLEVEL_OUTOF10: 8

## 2024-11-12 ASSESSMENT — PAIN DESCRIPTION - LOCATION
LOCATION: ABDOMEN
LOCATION: CHEST
LOCATION: ABDOMEN

## 2024-11-12 ASSESSMENT — PAIN DESCRIPTION - DESCRIPTORS
DESCRIPTORS: DISCOMFORT
DESCRIPTORS: ACHING
DESCRIPTORS: DISCOMFORT
DESCRIPTORS: DISCOMFORT
DESCRIPTORS: ACHING
DESCRIPTORS: DISCOMFORT
DESCRIPTORS: ACHING
DESCRIPTORS: ACHING

## 2024-11-12 ASSESSMENT — PAIN DESCRIPTION - ONSET
ONSET: ON-GOING

## 2024-11-12 ASSESSMENT — PAIN - FUNCTIONAL ASSESSMENT
PAIN_FUNCTIONAL_ASSESSMENT: ACTIVITIES ARE NOT PREVENTED

## 2024-11-12 ASSESSMENT — PAIN DESCRIPTION - PAIN TYPE
TYPE: ACUTE PAIN
TYPE: CHRONIC PAIN

## 2024-11-12 ASSESSMENT — PAIN DESCRIPTION - FREQUENCY
FREQUENCY: INTERMITTENT
FREQUENCY: CONTINUOUS

## 2024-11-12 ASSESSMENT — PAIN SCALES - WONG BAKER: WONGBAKER_NUMERICALRESPONSE: HURTS A LITTLE BIT

## 2024-11-12 ASSESSMENT — PAIN DESCRIPTION - ORIENTATION
ORIENTATION: MID
ORIENTATION: MID
ORIENTATION: LOWER

## 2024-11-12 NOTE — PLAN OF CARE
Problem: Chronic Conditions and Co-morbidities  Goal: Patient's chronic conditions and co-morbidity symptoms are monitored and maintained or improved  Outcome: Progressing     Problem: Pain  Goal: Verbalizes/displays adequate comfort level or baseline comfort level  Outcome: Progressing     Problem: Safety - Adult  Goal: Free from fall injury  Outcome: Progressing     Problem: Risk for Elopement  Goal: Patient will not exit the unit/facility without proper excort  Outcome: Progressing

## 2024-11-12 NOTE — PROGRESS NOTES
Infectious Diseases   Progress Note      Admission Date: 11/10/2024  Hospital Day: Hospital Day: 3   Attending: Toan Artis MD  Date of service: 11/12/2024     Chief complaint/ Reason for consult:     Peritoneal dialysis associated peritonitis, PD fluid collection on 11/10/2024 showed greater than 26,000 white cells  End-stage renal disease on peritoneal dialysis  Coronary artery disease  Dyslipidemia  Cholelithiasis without cholecystitis  Secondary hyperparathyroidism    Microbiology:      I have reviewed allavailable micro lab data and cultures    Results       Procedure Component Value Units Date/Time    Culture, Body Fluid (with Gram Stain) [[id number]] Collected: 11/12/24 0700    Order Status: Sent Specimen: Body Fluid from Peritoneal Dialysis Fluid Updated: 11/12/24 1351    Culture, Anaerobic [[id number]] Collected: 11/10/24 2247    Order Status: Canceled Specimen: Peritoneal Dialysis Fluid     Culture, Body Fluid (with Gram Stain) [[id number]]  (Abnormal) Collected: 11/10/24 2247    Order Status: Completed Specimen: Peritoneal Dialysis Fluid Updated: 11/12/24 1115     Gram Stain Result 3+ WBC's (Polymorphonuclear)  No organisms seen  No Epithelial Cells seen       Organism Staphylococcus epidermidis     Body Fluid Culture, Sterile --     Rare growth  No further workup      Narrative:      ORDER#: [id number]                          ORDERED BY: THEODORE SMITH  SOURCE: Peritoneal Dialysis Fluid          COLLECTED:  11/10/24 22:47  ANTIBIOTICS AT PEGGY.:                      RECEIVED :  11/10/24 23:36    Culture, Blood 1 [[id number]] Collected: 11/10/24 2152    Order Status: Completed Specimen: Blood Updated: 11/12/24 0215     Blood Culture, Routine No Growth to date.  Any change in status will be called.    Narrative:      ORDER#: [id number]                          ORDERED BY: THEODORE SMITH  SOURCE: Blood Hand, Right                  COLLECTED:  11/10/24 21:52  ANTIBIOTICS AT PEGGY.:                     chronic kidney disease, not on chronic dialysis (HCC) N18.5, D63.1    Chest pain R07.9    CAD (coronary artery disease) I25.10    Peritonitis associated with peritoneal dialysis, initial encounter ([facility]) T85.71XA    Cholelithiasis without cholecystitis K80.20    Overweight (BMI 25.0-29.9) E66.3    Diabetic nephropathy associated with type 2 diabetes mellitus (HCC) E11.21    S/P CABG (coronary artery bypass graft) Z95.1    Diarrhea R19.7    Diabetes education, encounter for Z71.89    Peritonitis (HCC) K65.9    Secondary hyperparathyroidism (HCC) N25.81       Please note that this chart was generated using Dragon dictation software. Although every effort was made to ensure the accuracy of this automated transcription, some errors in transcription may have occurred inadvertently. If you may need any clarification, please do not hesitate to contact me through EPIC or at the phone number provided below with my electronic signature.  Any pictures or media included in this note were obtained after taking informed verbal consent from the patient and with their approval to include those in the patient's medical record.        Indra Kinney MD, MPH, FACP, FIDSA  11/12/2024, 3:51 PM  Central Office Phone: [phone]  Central Office Fax: [phone]    [facility] Infectious Disease   [address], [address] ([address])  [place] [address]  [facility] days:  Tuesday & Thursday AM    [facility] Infectious Disease  [address] , [address] (Medical office Building)  [place], [address]  [facility] Clinic days: Wednesday AM

## 2024-11-12 NOTE — PROGRESS NOTES
PD patient. No urine collected today. Patient states he only goes once a day and has not gone today. Urinal at bedside. Patient states he will notify RN when urine is obtained.

## 2024-11-12 NOTE — PROGRESS NOTES
Treatment time: 10 Hours 6 minutes  Net UF: -643 ml  Dwell Time: 39 minutes {Gained;    Treatment completed without complications or complaints from patient. Effluent cloudy and lines taped to patient per protocol. Patient having abd pain and in fetal position.  Nurse notified of pain. VSS upon exiting room.

## 2024-11-12 NOTE — PLAN OF CARE
Problem: Chronic Conditions and Co-morbidities  Goal: Patient's chronic conditions and co-morbidity symptoms are monitored and maintained or improved  Outcome: Progressing     Problem: Pain  Goal: Verbalizes/displays adequate comfort level or baseline comfort level  Outcome: Progressing     Problem: Safety - Adult  Goal: Free from fall injury  Outcome: Progressing     Problem: Risk for Elopement  Goal: Patient will not exit the unit/facility without proper excort  Outcome: Progressing     Problem: Skin/Tissue Integrity  Goal: Absence of new skin breakdown  Description: 1.  Monitor for areas of redness and/or skin breakdown  2.  Assess vascular access sites hourly  3.  Every 4-6 hours minimum:  Change oxygen saturation probe site  4.  Every 4-6 hours:  If on nasal continuous positive airway pressure, respiratory therapy assess nares and determine need for appliance change or resting period.  Outcome: Progressing

## 2024-11-12 NOTE — PROGRESS NOTES
V2.0    [facility] Progress Note      Name:  Te Garay /Age/Sex: 1965  (59 y.o. male)   MRN & CSN:  [id number] & [id number] Encounter Date/Time: 2024 12:59 PM EST   Location:  [id number] PCP: Reid Lei MD     Attending:Toan Artis MD       Hospital Day: 3    Assessment and Recommendations   Te Garay is a 59 y.o. male who presents with Peritonitis (HCC)      Plan:   Acute peritonitis.  Continue IV antibiotics for now.  Follow-up on cultures.  White count noted.  Appreciate ID recommendation monitor closely with daily with CBC BMP.  Further recommendation to follow pending cultures.    End-stage on peritoneal dialysis  Nephrology following further recommendation to follow.    Type 1 diabetes.  Continue insulin pump.  Monitor for hypoglycemia serial Accu-Cheks.    Seizure disorder.  Continue IV As Needed Ativan As Well As Risk of Seizure.    Hypertension.  Continue Coreg-Will Monitor Blood Pressure.  CAD with Cardiomyopathy      Diet ADULT DIET; Regular; 4 carb choices (60 gm/meal); Low Sodium (2 gm)   DVT Prophylaxis    Code Status Full Code   Disposition          Personally reviewed Lab Studies and Imaging         Subjective:     Chief Complaint:     Te Garay is a 59 y.o. male who presents with pain is improved still with some nausea.  No overnight event.      Review of Systems:      Pertinent positives and negatives discussed in HPI    Objective:     Intake/Output Summary (Last 24 hours) at 2024 1259  Last data filed at 2024 1000  Gross per 24 hour   Intake 120 ml   Output 110 ml   Net 10 ml      Vitals:   Vitals:    24 1000 24 1053 24 1128 24 1145   BP: 117/70  118/68    Pulse: 73  74    Resp: 18 17 17    Temp: 97.5 °F (36.4 °C)  98 °F (36.7 °C)    TempSrc: Oral  Oral    SpO2:   96% 99%   Weight:       Height:             Physical Exam:      General: NAD  Eyes: EOMI  ENT: neck supple  Cardiovascular: Regular rate.  Respiratory: Clear to

## 2024-11-12 NOTE — PROGRESS NOTES
Clinical Pharmacy Note: Pharmacy to Dose Vancomycin    Vancomycin Day: 2  Indication: Perotinis  Current Dose: 500 mg x 1  Dosing Method: Bayesian Modeling    Random: 18.0    Recent Labs     11/11/24  0720 11/12/24 0529   BUN 50* 50*       Recent Labs     11/11/24  0720 11/12/24  0529   CREATININE 8.6* 8.5*       Recent Labs     11/11/24  0720 11/12/24 0529   WBC 7.4 3.9*         Intake/Output Summary (Last 24 hours) at 11/12/2024 1353  Last data filed at 11/12/2024 1000  Gross per 24 hour   Intake 120 ml   Output 110 ml   Net 10 ml         Ht Readings from Last 1 Encounters:   11/10/24 1.727 m (5' 8\")        Wt Readings from Last 1 Encounters:   11/12/24 88 kg (194 lb 0.1 oz)         Body mass index is 29.5 kg/m².    Estimated Creatinine Clearance: 10 mL/min (A) (based on SCr of 8.5 mg/dL (HH)).      Assessment/Plan:  Vancomycin level is therapeutic.  Will redose vancomycin 500 mg one time today.   Bayesian Modeling predicts an AUC of 449 mg/L*hr and trough of 18.1 mg/L.  A vancomycin trough has been ordered on 11/13 at 1200 for follow-up.  Changes in regimen will be determined based on culture results, renal function, and clinical response.  Pharmacy will continue to monitor and adjust regimen as necessary.    Thank you for the consult,    Radha Guerra, [facility] [id number]

## 2024-11-12 NOTE — PROGRESS NOTES
Nephrology progress Note  [phone]  [phone]   [employer]       Patient:  Te Garay   : 1965    Brief HPI      The patient is a 59 y.o.male with significant past medical history of ESRD on PD, CAD/CABG/CHF, carotid artery stenosis, DM 2, diabetic neuropathy, hypertension, hyperlipidemia, seizure disorder came in with complaints of abdominal pain, nausea for the past 2 to 3 days.  He was recently just treated for PD related peritonitis.  PD fluid cultures at that time were positive for Staph epidermidis, was initially treated with cefepime and vancomycin but was discharged on vancomycin IP  Discussed with charge RN at the out-pt dialysis unit  He has completed IP Vancomycin on 10/25/24, cell count and cultures done on 10/22/24 after the completion of ABx course were not suggestive of peritonitis   His PD fluid currently is consistent with peritonitis and has been on broad-spectrum IV antibiotics including cefepime and vancomycin    Subjective/interval history  Patient seen and examined  Abdominal pain persistent  Blood pressures have been low normal  Has been afebrile  On room air  Has had nausea and emesis this AM  Notes reviewed from dialysis RN this a.m.        Meds:  Scheduled Meds:   aspirin  81 mg Oral Daily    carvedilol  25 mg Oral BID WC    clopidogrel  75 mg Oral Daily    isosorbide mononitrate  30 mg Oral Nightly    levETIRAcetam  500 mg Oral Daily    rosuvastatin  10 mg Oral QPM    sertraline  50 mg Oral Daily    sodium chloride flush  10 mL IntraVENous 2 times per day    cefepime  1,000 mg IntraVENous Q24H    Insulin Pump - Basal Dose   SubCUTAneous Daily    Insulin Pump - Bolus Dose   SubCUTAneous 4x Daily AC & HS    vancomycin (VANCOCIN) intermittent dosing (placeholder)   Other RX Placeholder    fluconazole  100 mg Oral Daily    sodium chloride  500 mL IntraVENous Once     Continuous Infusions:   sodium chloride      dextrose       PRN Meds:.sodium chloride flush, sodium

## 2024-11-12 NOTE — PROGRESS NOTES
[facility] - Inpatient Rehabilitation Department   Phone: [phone]    Physical Therapy    [] Initial Evaluation            [x] Daily Treatment Note         [] Discharge Summary      Patient: Te Garay   : 1965   MRN: [id number]   Date of Service:  2024  Admitting Diagnosis: Peritonitis (HCC)  Current Admission Summary: 59-year-old gentleman with history of peripheral vascular disease, hypertension, CAD with cardiomyopathy, hyperlipidemia, diabetes, end-stage renal disease on peritoneal dialysis who had a recent peritoneal dialysis infection and returns with abdominal pains and associated cloudy dialysate concerning for peritonitis.   Past Medical History:  has a past medical history of Angina at rest (HCC), Cardiomyopathy (HCC), Carotid artery stenosis, Charcot foot due to diabetes mellitus (HCC), CHF (congestive heart failure) ([facility]), CKD (chronic kidney disease) stage 2, GFR 60-89 ml/min, Coronary artery disease, Coronary artery disease involving native heart with angina pectoris ([facility]), De Quervain thyroiditis, Diabetic peripheral neuropathy ([facility]), Diabetic polyneuropathy associated with type 1 diabetes mellitus ([facility]), Diastolic HF (heart failure) ([facility]), Essential hypertension, History of COVID-19, Hyperlipidemia, Hyperlipidemia with target LDL less than 70, Hyperparathyroidism due to renal insufficiency (HCC), Hypertension goal BP (blood pressure) < 130/80, Peritonitis associated with peritoneal dialysis (HCC), PVD (peripheral vascular disease) ([facility]), S/P CABG (coronary artery bypass graft), Seizure disorder ([facility]), Seizures ([facility]), Stenosis of carotid artery, Toe osteomyelitis, left, and Type 1 diabetes mellitus with chronic kidney disease ([facility]).  Past Surgical History:  has a past surgical history that includes Cardiac surgery (); Coronary artery bypass graft; Cardiac catheterization; hernia repair; Tonsillectomy; LAPAROSCOPY INSERTION PERITONEAL CATHETER (N/A, 2020);  skilled PT to improve functional mobility.  Anticipate Pt. Improving with medical status and be able to return home with assist of wife and HHPT.  Safety Interventions: patient left in bed, bed alarm in place, call light within reach, gait belt, patient at risk for falls, and nurse notified    Plan  Frequency: 3-5 x/per week  Current Treatment Recommendations: strengthening, balance training, functional mobility training, transfer training, gait training, stair training, endurance training, neuromuscular re-education, patient/caregiver education, and equipment evaluation/education    Goals  Patient Goals: Return home   Short Term Goals:  Time Frame: by d/c  Patient will complete bed mobility at modified independent   Patient will complete transfers at [facility]   Patient will ambulate 150 ft with use of LRAD at [facility]  Patient will ascend/descend 12 stairs with (B) handrail at supervision    Above goals reviewed on 11/12/2024.  All goals are ongoing at this time unless indicated above.      Therapy Session Time      Individual Group Co-treatment   Time In 1041       Time Out 1057       Minutes 16         Timed Code Treatment Minutes:  16 Minutes  Total Treatment Minutes:  16 minutes       Electronically Signed By: HERNAN YEAGER, PT, [id number]

## 2024-11-13 LAB
ALBUMIN SERPL-MCNC: 2.8 G/DL (ref 3.4–5)
ALBUMIN/GLOB SERPL: 0.9 {RATIO} (ref 1.1–2.2)
ALP SERPL-CCNC: 103 U/L (ref 40–129)
ALT SERPL-CCNC: 7 U/L (ref 10–40)
ANION GAP SERPL CALCULATED.3IONS-SCNC: 15 MMOL/L (ref 3–16)
AST SERPL-CCNC: 14 U/L (ref 15–37)
BASOPHILS # BLD: 0 K/UL (ref 0–0.2)
BASOPHILS NFR BLD: 0.6 %
BILIRUB SERPL-MCNC: <0.2 MG/DL (ref 0–1)
BUN SERPL-MCNC: 50 MG/DL (ref 7–20)
CALCIUM SERPL-MCNC: 8.4 MG/DL (ref 8.3–10.6)
CHLORIDE SERPL-SCNC: 94 MMOL/L (ref 99–110)
CO2 SERPL-SCNC: 22 MMOL/L (ref 21–32)
CREAT SERPL-MCNC: 8.2 MG/DL (ref 0.9–1.3)
DEPRECATED RDW RBC AUTO: 15.3 % (ref 12.4–15.4)
EOSINOPHIL # BLD: 0.1 K/UL (ref 0–0.6)
EOSINOPHIL NFR BLD: 1.6 %
GFR SERPLBLD CREATININE-BSD FMLA CKD-EPI: 7 ML/MIN/{1.73_M2}
GLUCOSE BLD-MCNC: 113 MG/DL (ref 70–99)
GLUCOSE BLD-MCNC: 224 MG/DL (ref 70–99)
GLUCOSE BLD-MCNC: 277 MG/DL (ref 70–99)
GLUCOSE SERPL-MCNC: 218 MG/DL (ref 70–99)
HCT VFR BLD AUTO: 36.9 % (ref 40.5–52.5)
HGB BLD-MCNC: 12.4 G/DL (ref 13.5–17.5)
LYMPHOCYTES # BLD: 1 K/UL (ref 1–5.1)
LYMPHOCYTES NFR BLD: 20.3 %
MCH RBC QN AUTO: 28.8 PG (ref 26–34)
MCHC RBC AUTO-ENTMCNC: 33.6 G/DL (ref 31–36)
MCV RBC AUTO: 85.7 FL (ref 80–100)
MONOCYTES # BLD: 0.6 K/UL (ref 0–1.3)
MONOCYTES NFR BLD: 12.7 %
NEUTROPHILS # BLD: 3 K/UL (ref 1.7–7.7)
NEUTROPHILS NFR BLD: 64.8 %
PERFORMED ON: ABNORMAL
PLATELET # BLD AUTO: 128 K/UL (ref 135–450)
PMV BLD AUTO: 9.6 FL (ref 5–10.5)
POTASSIUM SERPL-SCNC: 4.1 MMOL/L (ref 3.5–5.1)
PROT SERPL-MCNC: 5.9 G/DL (ref 6.4–8.2)
RBC # BLD AUTO: 4.31 M/UL (ref 4.2–5.9)
SODIUM SERPL-SCNC: 131 MMOL/L (ref 136–145)
WBC # BLD AUTO: 4.7 K/UL (ref 4–11)

## 2024-11-13 PROCEDURE — 6360000002 HC RX W HCPCS: Performed by: INTERNAL MEDICINE

## 2024-11-13 PROCEDURE — 6370000000 HC RX 637 (ALT 250 FOR IP): Performed by: INTERNAL MEDICINE

## 2024-11-13 PROCEDURE — 6360000002 HC RX W HCPCS: Performed by: NURSE PRACTITIONER

## 2024-11-13 PROCEDURE — 1200000000 HC SEMI PRIVATE

## 2024-11-13 PROCEDURE — 80053 COMPREHEN METABOLIC PANEL: CPT

## 2024-11-13 PROCEDURE — 2580000003 HC RX 258: Performed by: INTERNAL MEDICINE

## 2024-11-13 PROCEDURE — 90945 DIALYSIS ONE EVALUATION: CPT

## 2024-11-13 PROCEDURE — 85025 COMPLETE CBC W/AUTO DIFF WBC: CPT

## 2024-11-13 PROCEDURE — 99233 SBSQ HOSP IP/OBS HIGH 50: CPT | Performed by: INTERNAL MEDICINE

## 2024-11-13 PROCEDURE — 6360000002 HC RX W HCPCS: Performed by: HOSPITALIST

## 2024-11-13 PROCEDURE — 36415 COLL VENOUS BLD VENIPUNCTURE: CPT

## 2024-11-13 PROCEDURE — 6370000000 HC RX 637 (ALT 250 FOR IP): Performed by: HOSPITALIST

## 2024-11-13 RX ORDER — DIPHENHYDRAMINE HYDROCHLORIDE 50 MG/ML
25 INJECTION INTRAMUSCULAR; INTRAVENOUS EVERY 6 HOURS PRN
Status: DISCONTINUED | OUTPATIENT
Start: 2024-11-13 | End: 2024-11-14 | Stop reason: HOSPADM

## 2024-11-13 RX ORDER — DIPHENHYDRAMINE HYDROCHLORIDE 50 MG/ML
25 INJECTION INTRAMUSCULAR; INTRAVENOUS
Status: COMPLETED | OUTPATIENT
Start: 2024-11-13 | End: 2024-11-13

## 2024-11-13 RX ADMIN — LEVETIRACETAM 500 MG: 500 TABLET, FILM COATED ORAL at 08:29

## 2024-11-13 RX ADMIN — CARVEDILOL 12.5 MG: 6.25 TABLET, FILM COATED ORAL at 08:27

## 2024-11-13 RX ADMIN — FLUCONAZOLE 100 MG: 100 TABLET ORAL at 08:27

## 2024-11-13 RX ADMIN — Medication 10 ML: at 09:00

## 2024-11-13 RX ADMIN — ROSUVASTATIN CALCIUM 10 MG: 10 TABLET, FILM COATED ORAL at 16:44

## 2024-11-13 RX ADMIN — DIPHENHYDRAMINE HYDROCHLORIDE 25 MG: 50 INJECTION INTRAMUSCULAR; INTRAVENOUS at 16:44

## 2024-11-13 RX ADMIN — DIPHENHYDRAMINE HYDROCHLORIDE 25 MG: 50 INJECTION INTRAMUSCULAR; INTRAVENOUS at 00:52

## 2024-11-13 RX ADMIN — SERTRALINE 50 MG: 50 TABLET, FILM COATED ORAL at 08:29

## 2024-11-13 RX ADMIN — HYDROMORPHONE HYDROCHLORIDE 1 MG: 1 INJECTION, SOLUTION INTRAMUSCULAR; INTRAVENOUS; SUBCUTANEOUS at 20:24

## 2024-11-13 RX ADMIN — ASPIRIN 81 MG: 81 TABLET, COATED ORAL at 08:28

## 2024-11-13 RX ADMIN — MELATONIN TAB 3 MG 3 MG: 3 TAB at 20:24

## 2024-11-13 RX ADMIN — Medication 10 ML: at 20:25

## 2024-11-13 RX ADMIN — ISOSORBIDE MONONITRATE 30 MG: 30 TABLET, EXTENDED RELEASE ORAL at 20:24

## 2024-11-13 RX ADMIN — CLOPIDOGREL BISULFATE 75 MG: 75 TABLET ORAL at 08:28

## 2024-11-13 RX ADMIN — HYDROMORPHONE HYDROCHLORIDE 1 MG: 1 INJECTION, SOLUTION INTRAMUSCULAR; INTRAVENOUS; SUBCUTANEOUS at 07:28

## 2024-11-13 RX ADMIN — RANOLAZINE 500 MG: 500 TABLET, EXTENDED RELEASE ORAL at 08:27

## 2024-11-13 RX ADMIN — RANOLAZINE 500 MG: 500 TABLET, EXTENDED RELEASE ORAL at 20:24

## 2024-11-13 RX ADMIN — CARVEDILOL 12.5 MG: 6.25 TABLET, FILM COATED ORAL at 16:44

## 2024-11-13 RX ADMIN — HYDROMORPHONE HYDROCHLORIDE 1 MG: 1 INJECTION, SOLUTION INTRAMUSCULAR; INTRAVENOUS; SUBCUTANEOUS at 16:44

## 2024-11-13 ASSESSMENT — ENCOUNTER SYMPTOMS
COUGH: 0
RHINORRHEA: 0
EYE DISCHARGE: 0
BACK PAIN: 0
EYE REDNESS: 0
NAUSEA: 0
SINUS PRESSURE: 0
WHEEZING: 0
CONSTIPATION: 0
DIARRHEA: 0
SORE THROAT: 0
SHORTNESS OF BREATH: 0
ABDOMINAL PAIN: 1
SINUS PAIN: 0

## 2024-11-13 ASSESSMENT — PAIN SCALES - GENERAL
PAINLEVEL_OUTOF10: 7
PAINLEVEL_OUTOF10: 5
PAINLEVEL_OUTOF10: 5
PAINLEVEL_OUTOF10: 7

## 2024-11-13 ASSESSMENT — PAIN DESCRIPTION - DESCRIPTORS
DESCRIPTORS: ACHING
DESCRIPTORS_2: ACHING;STABBING
DESCRIPTORS: ACHING
DESCRIPTORS: ACHING

## 2024-11-13 ASSESSMENT — PAIN - FUNCTIONAL ASSESSMENT
PAIN_FUNCTIONAL_ASSESSMENT: ACTIVITIES ARE NOT PREVENTED
PAIN_FUNCTIONAL_ASSESSMENT: ACTIVITIES ARE NOT PREVENTED

## 2024-11-13 ASSESSMENT — PAIN DESCRIPTION - LOCATION
LOCATION: ABDOMEN
LOCATION: ABDOMEN
LOCATION_2: FOOT
LOCATION: ABDOMEN

## 2024-11-13 ASSESSMENT — PAIN DESCRIPTION - ORIENTATION
ORIENTATION: MID
ORIENTATION_2: LEFT
ORIENTATION: LEFT

## 2024-11-13 ASSESSMENT — PAIN DESCRIPTION - PAIN TYPE: TYPE: ACUTE PAIN;CHRONIC PAIN

## 2024-11-13 ASSESSMENT — PAIN DESCRIPTION - INTENSITY: RATING_2: 7

## 2024-11-13 ASSESSMENT — PAIN DESCRIPTION - FREQUENCY: FREQUENCY: INTERMITTENT

## 2024-11-13 ASSESSMENT — PAIN DESCRIPTION - ONSET: ONSET: ON-GOING

## 2024-11-13 NOTE — CARE COORDINATION
11/13/24 1540   Readmission Assessment   Number of Days since last admission? 8-30 days   Previous Disposition Home with Family   Who is being Interviewed Patient   What was the patient's/caregiver's perception as to why they think they needed to return back to the hospital? Other (Comment)  (I was having Abd pain)   Did you visit your Primary Care Physician after you left the hospital, before you returned this time? No   Why weren't you able to visit your PCP? Did not have an appointment   Did you see a specialist, such as Cardiac, Pulmonary, Orthopedic Physician, etc. after you left the hospital? No   Who advised the patient to return to the hospital? Self-referral   Does the patient report anything that got in the way of taking their medications? No   In our efforts to provide the best possible care to you and others like you, can you think of anything that we could have done to help you after you left the hospital the first time, so that you might not have needed to return so soon? Other (Comment)  (No)     Electronically signed by Berry Gallagher on 11/13/24 at 3:42 PM EST

## 2024-11-13 NOTE — PLAN OF CARE
Problem: Chronic Conditions and Co-morbidities  Goal: Patient's chronic conditions and co-morbidity symptoms are monitored and maintained or improved  11/13/2024 1129 by Eleln Mares RN  Outcome: Progressing  11/12/2024 2235 by Raquel Lynn RN  Outcome: Progressing     Problem: Pain  Goal: Verbalizes/displays adequate comfort level or baseline comfort level  11/13/2024 1129 by Ellen Mares RN  Outcome: Progressing  11/12/2024 2235 by Raquel Lynn RN  Outcome: Progressing     Problem: Safety - Adult  Goal: Free from fall injury  11/13/2024 1129 by Ellen Mares RN  Outcome: Progressing  11/12/2024 2235 by Raquel Lynn RN  Outcome: Progressing     Problem: Risk for Elopement  Goal: Patient will not exit the unit/facility without proper excort  11/12/2024 2235 by Raquel Lynn RN  Outcome: Progressing

## 2024-11-13 NOTE — PROGRESS NOTES
Shift assessment completed. Pt A&O, VSS.  Denies any needs at this time. Bed locked and in lowest position. Call light within reach. Will continue to monitor.

## 2024-11-13 NOTE — PROGRESS NOTES
CCPD Order   Exchanges: 4   Exchange Volume: 2.5 ml   Total Time: 10 hrs   Dextrose: 1.5%   Last Fill: 0 ml   Total Volume: 1000 ml     Orders verified. Supplies taken to pt's room. Report received. Cycler set up, primed and pre tested. Dressing changed on [facility] Cath site. Pt connected to cycler. CCPD initiated without problem. Initial effluent clear.       If problems should arise please call the [phone] on top of PD cycler machine.

## 2024-11-13 NOTE — PLAN OF CARE
Problem: Chronic Conditions and Co-morbidities  Goal: Patient's chronic conditions and co-morbidity symptoms are monitored and maintained or improved  11/12/2024 2235 by Raquel Lynn RN  Outcome: Progressing  11/12/2024 1100 by Huyen Pulido RN  Outcome: Progressing  Flowsheets (Taken 11/12/2024 0900)  Care Plan - Patient's Chronic Conditions and Co-Morbidity Symptoms are Monitored and Maintained or Improved: Monitor and assess patient's chronic conditions and comorbid symptoms for stability, deterioration, or improvement     Problem: Pain  Goal: Verbalizes/displays adequate comfort level or baseline comfort level  11/12/2024 2235 by Raquel Lynn RN  Outcome: Progressing  11/12/2024 1100 by Huyen Pulido RN  Outcome: Progressing     Problem: Safety - Adult  Goal: Free from fall injury  11/12/2024 2235 by Raquel Lynn RN  Outcome: Progressing  11/12/2024 1100 by Huyen Pulido RN  Outcome: Progressing     Problem: Risk for Elopement  Goal: Patient will not exit the unit/facility without proper excort  11/12/2024 2235 by Raquel Lynn RN  Outcome: Progressing  11/12/2024 1100 by Huyen Pulido RN  Outcome: Progressing     Problem: Skin/Tissue Integrity  Goal: Absence of new skin breakdown  Description: 1.  Monitor for areas of redness and/or skin breakdown  2.  Assess vascular access sites hourly  3.  Every 4-6 hours minimum:  Change oxygen saturation probe site  4.  Every 4-6 hours:  If on nasal continuous positive airway pressure, respiratory therapy assess nares and determine need for appliance change or resting period.  11/12/2024 2235 by Raquel Lynn RN  Outcome: Progressing  11/12/2024 1100 by Huyen Pulido RN  Outcome: Progressing

## 2024-11-13 NOTE — PROGRESS NOTES
V2.0    [facility] [place]      Name:  Te Garay /Age/Sex: 1965  (59 y.o. male)   MRN & CSN:  [id number] & [id number] Encounter Date/Time: 2024 12:59 PM EST   Location:  5TN-5583/5583-01 PCP: Reid Lei MD     Attending:Toan Artis MD       Hospital Day: 4    Assessment and Recommendations   Te Garay is a 59 y.o. male who presents with Peritonitis (HCC)      Plan:   Acute peritonitis.  Continue IV antibiotics for now.  Follow-up on cultures.  White count noted.  Appreciate ID   Culture noted.  Strength stamina P.  Await final sensitivity   recommendation monitor closely with daily with CBC BMP.  Further recommendation to follow pending cultures.    End-stage on peritoneal dialysis  Nephrology following further recommendation to follow.    Type 1 diabetes.  Continue insulin pump.  Monitor for hypoglycemia serial Accu-Cheks.    Seizure disorder.  Continue IV As Needed Ativan As Well As Risk of Seizure.    Hypertension.  Continue Coreg-Will Monitor Blood Pressure.  CAD with Cardiomyopathy      Diet ADULT DIET; Regular; 4 carb choices (60 gm/meal); Low Sodium (2 gm)   DVT Prophylaxis    Code Status Full Code   Disposition 1 to 2 days pending improved         Personally reviewed Lab Studies and Imaging         Subjective:     Chief Complaint:     Te Garay is a 59 y.o. male who presents with pain is improved still with some nausea.  No overnight event.  Still with pain.  Needing IV and p.o. pain meds    Review of Systems:      Pertinent positives and negatives discussed in HPI    Objective:     Intake/Output Summary (Last 24 hours) at 2024 0901  Last data filed at 2024 1733  Gross per 24 hour   Intake 360 ml   Output --   Net 360 ml      Vitals:   Vitals:    24 0020 24 0410 24 0701 24 0830   BP: 131/77 (!) 143/76  (!) 145/66   Pulse: 74 77  83   Resp: 18 18  18   Temp: 97.8 °F (36.6 °C) 98.4 °F (36.9 °C)  98.2 °F (36.8 °C)   TempSrc: Oral Oral   11/12/2024 09:50 PM    UROBILINOGEN 1.0 11/12/2024 09:50 PM    BILIRUBINUR Negative 11/12/2024 09:50 PM    BLOODU Negative 11/12/2024 09:50 PM    GLUCOSEU 500 11/12/2024 09:50 PM    GLUCOSEU >=1000 05/25/2010 07:19 PM    KETUA TRACE 11/12/2024 09:50 PM    AMORPHOUS 2+ 12/07/2021 05:08 AM     Urine Cultures:   Lab Results   Component Value Date/Time    LABURIN No growth at 18-36 hours 05/18/2016 09:10 AM     Blood Cultures:   Lab Results   Component Value Date/Time    BC  11/10/2024 09:52 PM     No Growth to date.  Any change in status will be called.     Lab Results   Component Value Date/Time    BLOODCULT2  11/10/2024 09:52 PM     No Growth to date.  Any change in status will be called.     Organism:   Lab Results   Component Value Date/Time    ORG Staphylococcus epidermidis 11/10/2024 10:47 PM         Electronically signed by Toan Artis MD on 11/13/2024 at 9:01 AM

## 2024-11-13 NOTE — FLOWSHEET NOTE
11/12/24 1948   Vital Signs   BP (!) 124/57   Temp 97.8 °F (36.6 °C)   Pulse 78   Respirations 16   Weight - Scale 85.4 kg (188 lb 4.4 oz)   Weight Method Actual;Bed scale   Dry Weight 85.3 kg (188 lb)     Connected to Cycler per protocol.    CCPD order: (3) 1.5 % Delflex, 5 L bags                       Total Volume: 10 L                       Therapy Time Duration (Hours): 10:00                        Exchange Volume: 2.5 L                        Number of Exchanges: 4                        No Final Fill    Dwell Time:01:45  Fill Time: 00:20  Drain Time: 00:25    Effluent cloudy, yellow    Exit site care done per protocol.  Site without redness/drainage, clean/dry.       DSD/occlusive to site.     Report given to Huyen Pulido RN

## 2024-11-13 NOTE — PROGRESS NOTES
Nephrology progress Note  [phone]  [phone]   [facility]       Patient:  Te Garay   : 1965    Brief HPI      The patient is a 59 y.o.male with significant past medical history of ESRD on PD, CAD/CABG/CHF, carotid artery stenosis, DM 2, diabetic neuropathy, hypertension, hyperlipidemia, seizure disorder came in with complaints of abdominal pain, nausea for the past 2 to 3 days.  He was recently just treated for PD related peritonitis.  PD fluid cultures at that time were positive for Staph epidermidis, was initially treated with cefepime and vancomycin but was discharged on vancomycin IP  Discussed with charge RN at the out-pt dialysis unit  He has completed IP Vancomycin on 10/25/24, cell count and cultures done on 10/22/24 after the completion of ABx course were not suggestive of peritonitis   His PD fluid currently is consistent with peritonitis and has been on broad-spectrum IV antibiotics including cefepime and vancomycin    Subjective/interval history  Patient seen and examined  States he feels much better  Abdominal pain better but still needing IV narcotics  Denies nausea or emesis  Blood pressures controlled  Has been afebrile  On room air    Meds:  Scheduled Meds:   carvedilol  12.5 mg Oral BID WC    ranolazine  500 mg Oral BID    aspirin  81 mg Oral Daily    clopidogrel  75 mg Oral Daily    isosorbide mononitrate  30 mg Oral Nightly    levETIRAcetam  500 mg Oral Daily    rosuvastatin  10 mg Oral QPM    sertraline  50 mg Oral Daily    sodium chloride flush  10 mL IntraVENous 2 times per day    cefepime  1,000 mg IntraVENous Q24H    Insulin Pump - Basal Dose   SubCUTAneous Daily    Insulin Pump - Bolus Dose   SubCUTAneous 4x Daily AC & HS    vancomycin (VANCOCIN) intermittent dosing (placeholder)   Other RX Placeholder    fluconazole  100 mg Oral Daily    sodium chloride  500 mL IntraVENous Once     Continuous Infusions:   sodium chloride      dextrose       PRN

## 2024-11-13 NOTE — CARE COORDINATION
Case Management Assessment  Initial Evaluation    Date/Time of Evaluation: 11/13/2024 3:53 PM  Assessment Completed by: Berry Gallagher    If patient is discharged prior to next notation, then this note serves as note for discharge by case management.    Patient Name: Te Garay                   YOB: 1965  Diagnosis: Peritonitis (HCC) [K65.9]  Peritonitis associated with peritoneal dialysis, initial encounter (HCC) [T85.71XA]                   Date / Time: 11/10/2024  9:27 PM    Patient Admission Status: Inpatient   Readmission Risk (Low < 19, Mod (19-27), High > 27): Readmission Risk Score: 31.3    Current PCP: Reid Lei MD  PCP verified by CM? Yes    Chart Reviewed: Yes      History Provided by: Patient  Patient Orientation: Alert and Oriented    Patient Cognition: Alert    Hospitalization in the last 30 days (Readmission):  Yes    If yes, Readmission Assessment in CM Navigator will be completed.    Advance Directives:      Code Status: Full Code   Patient's Primary Decision Maker is: Legal Next of Kin      Discharge Planning:    Patient lives with: Spouse/Significant Other, Children Type of Home: House  Primary Care Giver: Self  Patient Support Systems include: Spouse/Significant Other, Children, Family Members   Current Financial resources: Medicare  Current community resources: None  Current services prior to admission: Durable Medical Equipment            Current DME: Other (Comment) (PD)            Type of Home Care services:  Nursing Services, PT, OT    ADLS  Prior functional level: Independent in ADLs/IADLs, Bathing, Dressing, Toileting, Feeding, Cooking, Housework, Shopping, Mobility  Current functional level: Mobility, Toileting, Dressing, Bathing, Assistance with the following:    PT AM-PAC: 17 /24  OT AM-PAC: 18 /24    Family can provide assistance at DC: Yes  Would you like Case Management to discuss the discharge plan with any other family members/significant others, and if

## 2024-11-13 NOTE — PROGRESS NOTES
Infectious Diseases   Progress Note      Admission Date: 11/10/2024  Hospital Day: Hospital Day: 4   Attending: Toan Artis MD  Date of service: 11/13/2024     Chief complaint/ Reason for consult:     Peritoneal dialysis associated peritonitis, PD fluid collection on 11/10/2024 showed greater than 26,000 white cells  End-stage renal disease on peritoneal dialysis  Coronary artery disease  Dyslipidemia  Cholelithiasis without cholecystitis  Secondary hyperparathyroidism    Microbiology:      I have reviewed allavailable micro lab data and cultures    Results       Procedure Component Value Units Date/Time    Culture, Urine [[id number]] Collected: 11/12/24 2150    Order Status: No result Updated: 11/13/24 1232    Culture, Body Fluid (with Gram Stain) [[id number]] Collected: 11/12/24 0700    Order Status: Completed Specimen: Body Fluid from Peritoneal Dialysis Fluid Updated: 11/13/24 1140     Body Fluid Culture, Sterile No growth to date     Gram Stain Result No organisms seen  1+ WBC's (Polymorphonuclear)  No Epithelial Cells seen      Narrative:      ORDER#: [id number]                          ORDERED BY: LADARIUS RUCKER  SOURCE: Peritoneal Dialysis Fluid          COLLECTED:  11/12/24 07:00  ANTIBIOTICS AT PEGGY.:                      RECEIVED :  11/12/24 13:51    Culture, Anaerobic [[id number]] Collected: 11/10/24 2247    Order Status: Canceled Specimen: Peritoneal Dialysis Fluid     Culture, Body Fluid (with Gram Stain) [[id number]]  (Abnormal) Collected: 11/10/24 2247    Order Status: Completed Specimen: Peritoneal Dialysis Fluid Updated: 11/12/24 1115     Gram Stain Result 3+ WBC's (Polymorphonuclear)  No organisms seen  No Epithelial Cells seen       Organism Staphylococcus epidermidis     Body Fluid Culture, Sterile --     Rare growth  No further workup      Narrative:      ORDER#: [id number]                          ORDERED BY: THEODORE SMITH  SOURCE: Peritoneal Dialysis Fluid          COLLECTED:

## 2024-11-13 NOTE — PROGRESS NOTES
Pt. Assessment complete. Pt. Alert and oriented. Lying in bed without distress. PD continues without difficulty. Call light within reach. All needs met at this time.

## 2024-11-14 VITALS
RESPIRATION RATE: 18 BRPM | WEIGHT: 192.46 LBS | BODY MASS INDEX: 29.17 KG/M2 | DIASTOLIC BLOOD PRESSURE: 72 MMHG | SYSTOLIC BLOOD PRESSURE: 136 MMHG | TEMPERATURE: 97.2 F | OXYGEN SATURATION: 99 % | HEART RATE: 79 BPM | HEIGHT: 68 IN

## 2024-11-14 LAB
ALBUMIN SERPL-MCNC: 2.8 G/DL (ref 3.4–5)
ALBUMIN/GLOB SERPL: 1 {RATIO} (ref 1.1–2.2)
ALP SERPL-CCNC: 93 U/L (ref 40–129)
ALT SERPL-CCNC: 6 U/L (ref 10–40)
ANION GAP SERPL CALCULATED.3IONS-SCNC: 12 MMOL/L (ref 3–16)
AST SERPL-CCNC: 8 U/L (ref 15–37)
BACTERIA UR CULT: NORMAL
BILIRUB SERPL-MCNC: <0.2 MG/DL (ref 0–1)
BUN SERPL-MCNC: 50 MG/DL (ref 7–20)
CALCIUM SERPL-MCNC: 8.2 MG/DL (ref 8.3–10.6)
CHLORIDE SERPL-SCNC: 92 MMOL/L (ref 99–110)
CO2 SERPL-SCNC: 26 MMOL/L (ref 21–32)
CREAT SERPL-MCNC: 7.8 MG/DL (ref 0.9–1.3)
GFR SERPLBLD CREATININE-BSD FMLA CKD-EPI: 7 ML/MIN/{1.73_M2}
GLUCOSE BLD-MCNC: 310 MG/DL (ref 70–99)
GLUCOSE BLD-MCNC: 372 MG/DL (ref 70–99)
GLUCOSE BLD-MCNC: 441 MG/DL (ref 70–99)
GLUCOSE SERPL-MCNC: 356 MG/DL (ref 70–99)
PERFORMED ON: ABNORMAL
POTASSIUM SERPL-SCNC: 3.9 MMOL/L (ref 3.5–5.1)
PROT SERPL-MCNC: 5.5 G/DL (ref 6.4–8.2)
SODIUM SERPL-SCNC: 130 MMOL/L (ref 136–145)
VANCOMYCIN SERPL-MCNC: 17.1 UG/ML

## 2024-11-14 PROCEDURE — 6370000000 HC RX 637 (ALT 250 FOR IP): Performed by: HOSPITALIST

## 2024-11-14 PROCEDURE — 6370000000 HC RX 637 (ALT 250 FOR IP): Performed by: INTERNAL MEDICINE

## 2024-11-14 PROCEDURE — 6360000002 HC RX W HCPCS: Performed by: INTERNAL MEDICINE

## 2024-11-14 PROCEDURE — 6360000002 HC RX W HCPCS: Performed by: HOSPITALIST

## 2024-11-14 PROCEDURE — 80202 ASSAY OF VANCOMYCIN: CPT

## 2024-11-14 PROCEDURE — 97530 THERAPEUTIC ACTIVITIES: CPT

## 2024-11-14 PROCEDURE — 97535 SELF CARE MNGMENT TRAINING: CPT

## 2024-11-14 PROCEDURE — 99233 SBSQ HOSP IP/OBS HIGH 50: CPT | Performed by: INTERNAL MEDICINE

## 2024-11-14 PROCEDURE — 80053 COMPREHEN METABOLIC PANEL: CPT

## 2024-11-14 PROCEDURE — 36415 COLL VENOUS BLD VENIPUNCTURE: CPT

## 2024-11-14 PROCEDURE — 2580000003 HC RX 258: Performed by: INTERNAL MEDICINE

## 2024-11-14 RX ORDER — OXYCODONE AND ACETAMINOPHEN 5; 325 MG/1; MG/1
1 TABLET ORAL EVERY 6 HOURS PRN
Qty: 12 TABLET | Refills: 0 | Status: SHIPPED | OUTPATIENT
Start: 2024-11-14 | End: 2024-11-17

## 2024-11-14 RX ORDER — VANCOMYCIN HCL IN 5 % DEXTROSE 1.25 G/25
1250 PLASTIC BAG, INJECTION (ML) INTRAVENOUS ONCE
Status: COMPLETED | OUTPATIENT
Start: 2024-11-14 | End: 2024-11-14

## 2024-11-14 RX ORDER — FLUCONAZOLE 100 MG/1
100 TABLET ORAL DAILY
Qty: 30 TABLET | Refills: 0 | Status: SHIPPED | OUTPATIENT
Start: 2024-11-14 | End: 2024-12-14

## 2024-11-14 RX ADMIN — VANCOMYCIN HYDROCHLORIDE 1250 MG: 1.25 INJECTION, SOLUTION INTRAVITREAL at 10:05

## 2024-11-14 RX ADMIN — CLOPIDOGREL BISULFATE 75 MG: 75 TABLET ORAL at 09:56

## 2024-11-14 RX ADMIN — HYDROMORPHONE HYDROCHLORIDE 1 MG: 1 INJECTION, SOLUTION INTRAMUSCULAR; INTRAVENOUS; SUBCUTANEOUS at 02:08

## 2024-11-14 RX ADMIN — Medication 10 ML: at 09:56

## 2024-11-14 RX ADMIN — CARVEDILOL 12.5 MG: 6.25 TABLET, FILM COATED ORAL at 09:56

## 2024-11-14 RX ADMIN — LEVETIRACETAM 500 MG: 500 TABLET, FILM COATED ORAL at 09:56

## 2024-11-14 RX ADMIN — FLUCONAZOLE 100 MG: 100 TABLET ORAL at 09:56

## 2024-11-14 RX ADMIN — HYDROMORPHONE HYDROCHLORIDE 1 MG: 1 INJECTION, SOLUTION INTRAMUSCULAR; INTRAVENOUS; SUBCUTANEOUS at 11:33

## 2024-11-14 RX ADMIN — RANOLAZINE 500 MG: 500 TABLET, EXTENDED RELEASE ORAL at 09:56

## 2024-11-14 RX ADMIN — ASPIRIN 81 MG: 81 TABLET, COATED ORAL at 09:56

## 2024-11-14 RX ADMIN — SERTRALINE 50 MG: 50 TABLET, FILM COATED ORAL at 09:56

## 2024-11-14 RX ADMIN — DIPHENHYDRAMINE HYDROCHLORIDE 25 MG: 50 INJECTION INTRAMUSCULAR; INTRAVENOUS at 02:08

## 2024-11-14 RX ADMIN — ONDANSETRON 4 MG: 2 INJECTION INTRAMUSCULAR; INTRAVENOUS at 11:32

## 2024-11-14 ASSESSMENT — ENCOUNTER SYMPTOMS
SINUS PRESSURE: 0
CONSTIPATION: 0
EYE REDNESS: 0
NAUSEA: 0
RHINORRHEA: 0
SHORTNESS OF BREATH: 0
EYE DISCHARGE: 0
DIARRHEA: 0
WHEEZING: 0
COUGH: 0
SINUS PAIN: 0
SORE THROAT: 0
BACK PAIN: 0
ABDOMINAL PAIN: 0

## 2024-11-14 ASSESSMENT — PAIN DESCRIPTION - DESCRIPTORS
DESCRIPTORS_2: ACHING;STABBING
DESCRIPTORS: ACHING
DESCRIPTORS: ACHING

## 2024-11-14 ASSESSMENT — PAIN DESCRIPTION - PAIN TYPE: TYPE: ACUTE PAIN;CHRONIC PAIN

## 2024-11-14 ASSESSMENT — PAIN DESCRIPTION - LOCATION
LOCATION: ABDOMEN
LOCATION_2: FOOT
LOCATION: FOOT

## 2024-11-14 ASSESSMENT — PAIN SCALES - GENERAL
PAINLEVEL_OUTOF10: 7
PAINLEVEL_OUTOF10: 9

## 2024-11-14 ASSESSMENT — PAIN DESCRIPTION - ORIENTATION
ORIENTATION_2: LEFT
ORIENTATION: LEFT

## 2024-11-14 ASSESSMENT — PAIN - FUNCTIONAL ASSESSMENT: PAIN_FUNCTIONAL_ASSESSMENT: ACTIVITIES ARE NOT PREVENTED

## 2024-11-14 ASSESSMENT — PAIN DESCRIPTION - INTENSITY: RATING_2: 7

## 2024-11-14 NOTE — PROGRESS NOTES
Infectious Diseases   Progress Note      Admission Date: 11/10/2024  Hospital Day: Hospital Day: 5   Attending: Toan Artis MD  Date of service: 11/14/2024     Chief complaint/ Reason for consult:     Peritoneal dialysis associated peritonitis, PD fluid collection on 11/10/2024 showed greater than 26,000 white cells  End-stage renal disease on peritoneal dialysis  Coronary artery disease  Dyslipidemia  Cholelithiasis without cholecystitis  Secondary hyperparathyroidism    Microbiology:      I have reviewed allavailable micro lab data and cultures    Results       Procedure Component Value Units Date/Time    Culture, Urine [[id number]] Collected: 11/12/24 2150    Order Status: Completed Specimen: Urine, clean catch Updated: 11/14/24 0038     Urine Culture, Routine No growth at 18 to 36 hours    Narrative:      ORDER#: [id number]                          ORDERED BY: THEODORE SMITH  SOURCE: Urine Clean Catch                  COLLECTED:  11/12/24 21:50  ANTIBIOTICS AT PEGGY.:                      RECEIVED :  11/13/24 12:32    Culture, Body Fluid (with Gram Stain) [[id number]] Collected: 11/12/24 0700    Order Status: Completed Specimen: Body Fluid from Peritoneal Dialysis Fluid Updated: 11/14/24 0803     Body Fluid Culture, Sterile --     No growth to date  No growth 36 to 48 hours       Gram Stain Result No organisms seen  1+ WBC's (Polymorphonuclear)  No Epithelial Cells seen      Narrative:      ORDER#: [id number]                          ORDERED BY: LADARIUS RUCKER  SOURCE: Peritoneal Dialysis Fluid          COLLECTED:  11/12/24 07:00  ANTIBIOTICS AT PEGGY.:                      RECEIVED :  11/12/24 13:51    Culture, Anaerobic [[id number]] Collected: 11/10/24 2247    Order Status: Canceled Specimen: Peritoneal Dialysis Fluid     Culture, Body Fluid (with Gram Stain) [[id number]]  (Abnormal) Collected: 11/10/24 2247    Order Status: Completed Specimen: Peritoneal Dialysis Fluid Updated: 11/12/24 1115     Gram

## 2024-11-14 NOTE — PROGRESS NOTES
[facility] - Inpatient Rehabilitation Department   Phone: [phone]    Occupational Therapy    [] Initial Evaluation            [x] Daily Treatment Note         [] Discharge Summary      Patient: Te Garay   : 1965   MRN: [id number]   Date of Service:  2024    Admitting Diagnosis:  Peritonitis (HCC)  Current Admission Summary: 59-year-old gentleman with history of peripheral vascular disease, hypertension, CAD with cardiomyopathy, hyperlipidemia, diabetes, end-stage renal disease on peritoneal dialysis who had a recent peritoneal dialysis infection and returns with abdominal pains and associated cloudy dialysate concerning for peritonitis.   Past Medical History:  has a past medical history of Angina at rest ([facility]), Cardiomyopathy (HCC), Carotid artery stenosis, Charcot foot due to diabetes mellitus (HCC), CHF (congestive heart failure) ([facility]), CKD (chronic kidney disease) stage 2, GFR 60-89 ml/min, Coronary artery disease, Coronary artery disease involving native heart with angina pectoris ([facility]), De Quervain thyroiditis, Diabetic peripheral neuropathy ([facility]), Diabetic polyneuropathy associated with type 1 diabetes mellitus ([facility]), Diastolic HF (heart failure) ([facility]), Essential hypertension, History of COVID-19, Hyperlipidemia, Hyperlipidemia with target LDL less than 70, Hyperparathyroidism due to renal insufficiency (HCC), Hypertension goal BP (blood pressure) < 130/80, Peritonitis associated with peritoneal dialysis ([facility]), PVD (peripheral vascular disease) ([facility]), S/P CABG (coronary artery bypass graft), Seizure disorder ([facility]), Seizures ([facility]), Stenosis of carotid artery, Toe osteomyelitis, left, and Type 1 diabetes mellitus with chronic kidney disease ([facility]).  Past Surgical History:  has a past surgical history that includes Cardiac surgery (); Coronary artery bypass graft; Cardiac catheterization; hernia repair; Tonsillectomy; LAPAROSCOPY INSERTION PERITONEAL CATHETER (N/A,  decreased safety awareness, decreased endurance, decreased balance, decreased IADL  Prognosis: good  Clinical Assessment: The patient is a 59 y.o. male who presents below their baseline level of function due to above deficits, associated with Peritonitis (HCC). Typically, pt is mod I for mobility, IND for ADL. Currently, pt is requiring min A for transfers. Anticipate pt will improve functionally with continued medical mgmt of peritonitis. Continued OT indicated in order to promote return to PLOF    Safety Interventions: patient left in chair, chair alarm in place, call light within reach, gait belt, and nurse notified    Plan  Frequency: 3-5 x/per week  Current Treatment Recommendations: strengthening, balance training, functional mobility training, transfer training, endurance training, patient/caregiver education, ADL/self-care training, IADL training, home management training, and equipment evaluation/education    Goals    Short Term Goals:  Time Frame: by dc  Patient will complete upper body ADL at modified independent   Patient will complete lower body ADL at modified independent   Patient will complete toileting at modified independent   Patient will complete grooming at modified independent   Patient will complete functional transfers at modified independent   Patient will complete functional mobility at modified independent     Above goals reviewed on 11/14/2024.  All goals are ongoing at this time unless indicated above.       Therapy Session Time     Individual Group Co-treatment   Time In 1057     Time Out 1127     Minutes 30          Timed Code Treatment Minutes:   30  Total Treatment Minutes:  30       Electronically Signed By: Magnolia Villarreal OTR/L [id number]

## 2024-11-14 NOTE — CARE COORDINATION
11/14/24 1509   IMM Letter   IMM Letter given to Patient/Family/Significant other/Guardian/POA/by: IMM given by CM   IMM Letter date given: 11/14/24   IMM Letter time given: 1350

## 2024-11-14 NOTE — DISCHARGE INSTR - COC
Continuity of Care Form    Patient Name: Te Garay   :  1965  MRN:  [id number]    Admit date:  11/10/2024  Discharge date:  ***    Code Status Order: Full Code   Advance Directives:   Advance Care Flowsheet Documentation             Admitting Physician:  Ishmael Pederson DO  PCP: Reid Lei MD    Discharging Nurse: ***  Discharging Hospital Unit/Room#: 5TN-5583/5583-01  Discharging Unit Phone Number: ***    Emergency Contact:   Extended Emergency Contact Information  Primary Emergency Contact: Sachi Garay  Address: [address] [place] [address]  Home Phone: [phone]  Mobile Phone: [phone]  Relation: Spouse  Secondary Emergency Contact: Kendell Rodriguez  Home Phone: [phone]  Relation: Child    Past Surgical History:  Past Surgical History:   Procedure Laterality Date    BLADDER SURGERY Left 2023    CYSTOSCOPY, LEFT URETEROSCOPY, STONE BASKET MANIPULATION, PLACEMENT OF LEFT URETERAL STENT performed by Chevy Sepulveda MD at [facility] [place] OR    CARDIAC CATHETERIZATION      CARDIAC PROCEDURE N/A 2024    Left and right heart w coronary bypass graft performed by David Parish MD at [facility] CARDIAC CATH LAB    CARDIAC PROCEDURE N/A 2024    Left heart cath / coronary angiography with PCI performed by Larry Marin MD at [facility] CARDIAC CATH LAB    CARDIAC PROCEDURE N/A 2024    Insert stent juan coronary bypass graft performed by Larry Marin MD at [facility] CARDIAC CATH LAB    CARDIAC PROCEDURE N/A 2024    Intravascular ultrasound performed by Larry Marin MD at [facility] [place] CARDIAC CATH LAB    CARDIAC SURGERY      triple bypass at     CAROTID STENT PLACEMENT Right     CORONARY ARTERY BYPASS GRAFT      FOOT DEBRIDEMENT Left 10/16/2023    LEFT FOOT DEBRIDEMENT INCISION AND DRAINAGE WITH BONE BIOPSY performed by David Lowe DPM at [facility] OR    FOOT DEBRIDEMENT Left 10/23/2023    LEFT FOOT INCISION AND DRAINAGE WITH

## 2024-11-14 NOTE — PROGRESS NOTES
Clinical Pharmacy Note: Pharmacy to Dose Vancomycin    Vancomycin Day: 3  Indication: Peritonitis  Current Dose: Pulse dosing  Dosing Method: Dosing by random levels    Random: 17.1    Recent Labs     11/13/24  0522 11/14/24  0609   BUN 50* 50*       Recent Labs     11/13/24  0522 11/14/24  0609   CREATININE 8.2* 7.8*       Recent Labs     11/12/24  0529 11/13/24  0522   WBC 3.9* 4.7         Intake/Output Summary (Last 24 hours) at 11/14/2024 0720  Last data filed at 11/13/2024 2020  Gross per 24 hour   Intake 240 ml   Output --   Net 240 ml         Ht Readings from Last 1 Encounters:   11/10/24 1.727 m (5' 8\")        Wt Readings from Last 1 Encounters:   11/14/24 90.7 kg (200 lb)         Body mass index is 30.41 kg/m².    Estimated Creatinine Clearance: 11 mL/min (A) (based on SCr of 7.8 mg/dL (HH)).      Assessment/Plan:  Vancomycin level is therapeutic.  Will redose vancomycin 1250mg one time today.   PD pt  A vancomycin random level has been ordered on 11/17 at 0600 for follow-up.  Changes in regimen will be determined based on culture results, renal function, and clinical response.  Pharmacy will continue to monitor and adjust regimen as necessary.    Thank you for the consult,    Sejal Barlow [facility]  [id number]

## 2024-11-14 NOTE — CARE COORDINATION
Case Management -  Discharge Note      Patient Name: Te Garay                   YOB: 1965            Readmission Risk (Low < 19, Mod (19-27), High > 27): Readmission Risk Score: 31.6    Current PCP: Reid Lei MD      (IMM) Important Message from Medicare:    Has pt received appropriate IMM before discharge if required: yes  Date: 11/14/24    PT AM-PAC: 17 /24  OT AM-PAC: 20 /24    Patient/patient representative has been educated on the benefits of HHC as well as the possible risks of declining recommended services. Patient/patient representative has acknowledged the information provided and decided on the following discharge plan. Patient/ patient representative has been provided freedom of choice regarding service provider, supported by basic dialogue that supports the patient's individualized plan of care/goals.    [facility]  [facility],   [place] [address]  Phone: [phone]  Fax: [phone]    Financial    Payor: MEDICARE / Plan: MEDICARE PART A AND B / Product Type: *No Product type* /     Pharmacy:  Potential assistance Purchasing Medications: No  Meds-to-Beds request: Yes      [facility] - [place], [place] - [address] -  [phone] - F [phone]  [address] [address]  Phone: [phone] Fax: [phone]    [id number] - [place], [place] - [address] [facility] - P [phone] - F [phone]  [address]  [facility] [address]  Phone: [phone] Fax: [phone]      Notes:    Additional Case Management Notes:  Patient to discharge home today w/continuing [facility] services.  Messaged hospitalist to put in these orders.  Called Cindy w/[facility] and informed of discharge today.  No other needs.    Electronically signed by ROBIN Miller, LSW on 11/14/2024 at 3:32 PM

## 2024-11-14 NOTE — FLOWSHEET NOTE
11/14/24 0900   Vitals   BP (!) 154/80   Temp 98 °F (36.7 °C)   Temp Source Oral   Pulse 71   Respirations 18   Weight - Scale 87.3 kg (192 lb 7.4 oz)   Observations & Evaluations   Level of Consciousness 0   Oriented X 4   Heart Rhythm Regular   Respiratory Quality/Effort Unlabored   O2 Device None (Room air)   Bilateral Breath Sounds Clear   Skin Condition/Temp Dry;Warm   Abdomen Inspection Soft;Rounded   Edema Right lower extremity   Post-Treatment (Cycler)   Average Dwell Time (Hours:Minutes) 01:55   Lost Dwell Time (Hours:Minutes) 00:43   Effluent Appearance Clear     Disconnected from CCPD per protocol.  Effluent: clear, no fibrin noted  Total time: 10 hr 9 min   Total UF:  -547 ml.  Total Volume:  9996 ml.  Dwell time gained:  0 hr 43 min.  Pt Tolerated procedure: well  Report given to: Adrianna Escoto  Last BM: few days ago per pt

## 2024-11-14 NOTE — PROGRESS NOTES
CLINICAL PHARMACY NOTE: MEDS TO BEDS    Total # of Prescriptions Filled: 2   The following medications were delivered to the patient:  OXYCODONE - ACETAMINOPHEN 5 - 325 TABS  FLUCONAZOLE 100MG TABS    Additional Documentation: Ellen SANCHEZ approved to deliver medications to patient room=signed  [facility] Tech

## 2024-11-14 NOTE — PROGRESS NOTES
V2.0    [facility] Progress Note      Name:  Te Garay /Age/Sex: 1965  (59 y.o. male)   MRN & CSN:  [id number] & [id number] Encounter Date/Time: 2024 12:59 PM EST   Location:  5TN-5583/5583-01 PCP: Reid Lei MD     Attending:Toan Artis MD       Hospital Day: 5    Assessment and Recommendations   Te Garay is a 59 y.o. male who presents with Peritonitis (HCC)      Plan:   Acute peritonitis.  Continue IV antibiotics for now.  Follow-up on cultures.  White count noted.  Appreciate ID   Culture noted.  Strength stamina P.  Await final sensitivity   Dispo once okay with infectious disease and final antibiotic      End-stage on peritoneal dialysis  Nephrology following further recommendation to follow.    Type 1 diabetes.  Continue insulin pump.  Monitor for hypoglycemia serial Accu-Cheks.    Seizure disorder.  Continue IV As Needed Ativan As Well As Risk of Seizure.    Hypertension.  Continue Coreg-Will Monitor Blood Pressure.  CAD with Cardiomyopathy      Diet ADULT DIET; Regular; 4 carb choices (60 gm/meal); Low Sodium (2 gm)   DVT Prophylaxis    Code Status Full Code   Disposition 1 once cleared by infectious disease         Personally reviewed Lab Studies and Imaging         Subjective:     Chief Complaint:     Te Garay is a 59 y.o. male who presents with pain is improved still with some nausea.  No overnight event.  Still with pain.  Needing IV and p.o. pain meds    Review of Systems:      Pertinent positives and negatives discussed in HPI    Objective:     Intake/Output Summary (Last 24 hours) at 2024 09  Last data filed at 2024  Gross per 24 hour   Intake 240 ml   Output --   Net 240 ml      Vitals:   Vitals:    24 0155 24 0208 24 0238 24 0317   BP:    126/72   Pulse: 74   71   Resp:  16 16 16   Temp:    97.5 °F (36.4 °C)   TempSrc:    Oral   SpO2:    95%   Weight:    90.7 kg (200 lb)   Height:             Physical Exam:      General:

## 2024-11-15 LAB
BACTERIA BLD CULT ORG #2: NORMAL
BACTERIA BLD CULT: NORMAL
BACTERIA FLD AEROBE CULT: NORMAL
GRAM STN SPEC: NORMAL

## 2024-11-15 NOTE — PROGRESS NOTES
Discharge instructions reviewed w/patient. Pt verbalized understanding and denies questions. Pt rx placed in pt's bag, pt made aware. PIV removed - see flowsheet. Pt dc'd via wheelchair to lobby to await his ride home. Pt refuses need for assistance in waiting with him or escorting him from lobby to wife's car when she arrives.

## 2024-11-15 NOTE — PROGRESS NOTES
Nephrology progress Note  [phone]  [phone]   [employer]       Patient:  Te Garay   : 1965    Brief HPI      The patient is a 59 y.o.male with significant past medical history of ESRD on PD, CAD/CABG/CHF, carotid artery stenosis, DM 2, diabetic neuropathy, hypertension, hyperlipidemia, seizure disorder came in with complaints of abdominal pain, nausea for the past 2 to 3 days.  He was recently just treated for PD related peritonitis.  PD fluid cultures at that time were positive for Staph epidermidis, was initially treated with cefepime and vancomycin but was discharged on vancomycin IP  Discussed with charge RN at the out-pt dialysis unit  He has completed IP Vancomycin on 10/25/24, cell count and cultures done on 10/22/24 after the completion of ABx course were not suggestive of peritonitis   His PD fluid currently is consistent with peritonitis and has been on broad-spectrum IV antibiotics including cefepime and vancomycin    Subjective/interval history  Pt seen and examined  Hyponatremic this AM  Abdominal pain better  No nausea/emesis  No fevers/chills    Meds:  Scheduled Meds:   carvedilol  12.5 mg Oral BID WC    ranolazine  500 mg Oral BID    aspirin  81 mg Oral Daily    clopidogrel  75 mg Oral Daily    isosorbide mononitrate  30 mg Oral Nightly    levETIRAcetam  500 mg Oral Daily    rosuvastatin  10 mg Oral QPM    sertraline  50 mg Oral Daily    sodium chloride flush  10 mL IntraVENous 2 times per day    Insulin Pump - Basal Dose   SubCUTAneous Daily    Insulin Pump - Bolus Dose   SubCUTAneous 4x Daily AC & HS    vancomycin (VANCOCIN) intermittent dosing (placeholder)   Other RX Placeholder    fluconazole  100 mg Oral Daily    sodium chloride  500 mL IntraVENous Once     Continuous Infusions:   sodium chloride      dextrose       PRN Meds:.diphenhydrAMINE, butalbital-acetaminophen-caffeine, sodium chloride flush, sodium chloride, promethazine **OR** ondansetron, melatonin,  acetaminophen **OR** acetaminophen, dextrose bolus **OR** dextrose bolus, glucagon (rDNA), dextrose, HYDROmorphone **OR** HYDROmorphone, promethazine (PHENERGAN) 12.5 mg in sodium chloride 0.9 % 50 mL IVPB      Vitals:  /72   Pulse 79   Temp 97.2 °F (36.2 °C) (Oral)   Resp 18   Ht 1.727 m (5' 8\")   Wt 87.3 kg (192 lb 7.4 oz)   SpO2 99%   BMI 29.26 kg/m²     Physical Exam  General : AAOx3, not in pain or respiratory distress, resting in bed  HEENT : mucosa moist.  CVS: S1 S2 normal, regular rhythm, no murmurs or rubs.  Lungs: Clear, no wheezing or crackles.  Abd: Soft, bowel sounds normal, non-tender.  PD catheter site with no drainage or erythema  Ext: No edema      Labs:  CBC with Differential:    Lab Results   Component Value Date/Time    WBC 4.7 11/13/2024 05:22 AM    RBC 4.31 11/13/2024 05:22 AM    HGB 12.4 11/13/2024 05:22 AM    HCT 36.9 11/13/2024 05:22 AM     11/13/2024 05:22 AM    MCV 85.7 11/13/2024 05:22 AM    MCH 28.8 11/13/2024 05:22 AM    MCHC 33.6 11/13/2024 05:22 AM    RDW 15.3 11/13/2024 05:22 AM    LYMPHOPCT 20.3 11/13/2024 05:22 AM    MONOPCT 12.7 11/13/2024 05:22 AM    EOSPCT 1.6 11/13/2024 05:22 AM    BASOPCT 0.6 11/13/2024 05:22 AM    MONOSABS 0.6 11/13/2024 05:22 AM    LYMPHSABS 1.0 11/13/2024 05:22 AM    EOSABS 0.1 11/13/2024 05:22 AM    BASOSABS 0.0 11/13/2024 05:22 AM    DIFFTYPE Scan-K 06/15/2013 01:00 AM     BMP:    Lab Results   Component Value Date/Time     11/14/2024 06:09 AM    K 3.9 11/14/2024 06:09 AM    CL 92 11/14/2024 06:09 AM    CO2 26 11/14/2024 06:09 AM    BUN 50 11/14/2024 06:09 AM    CREATININE 7.8 11/14/2024 06:09 AM    CALCIUM 8.2 11/14/2024 06:09 AM    GFRAA 9 10/03/2022 04:45 AM    GFRAA 44 06/15/2013 01:00 AM    LABGLOM 7 11/14/2024 06:09 AM    LABGLOM 6 04/30/2024 04:51 AM    GLUCOSE 356 11/14/2024 06:09 AM     Ionized Calcium:  No components found for: \"IONCA\"  Magnesium:    Lab Results   Component Value Date/Time    MG 1.78 11/12/2024 05:29

## 2024-11-18 NOTE — DISCHARGE SUMMARY
[facility]    Patient Demographics    Patient. Te Garay  Date of Birth. 1965  MRN. [id number]     Primary care provider. Reid Lei MD  (Tel: [phone])    Admit date: 11/10/2024    Discharge date (blank if same as Note Date): 11/14/2024  Note Date: 11/18/2024     Reason for Hospitalization.   Chief Complaint   Patient presents with    Abdominal Pain           Problem-based Hospital Course.    Acute peritonitis.  Treated with V antibiotics for now.  Follow-up on cultures.  White count noted.    With recurrent episode culture growing staph epi.  Patient transition to intraperitoneal antibiotics.  On discharge outpatient follow-up with ID.  Patient if has recurrent peritonitis then may need to present to him with     End-stage on peritoneal dialysis  Nephrology following further recommendation to follow.     Type 1 diabetes.  Continue insulin pump.  Monitor for hypoglycemia serial Accu-Cheks.     Seizure disorder.  C   Consults.  IP CONSULT TO NEPHROLOGY  PHARMACY TO DOSE MEDICATION  PHARMACY TO DOSE VANCOMYCIN  IP CONSULT TO INFECTIOUS DISEASES  IP CONSULT TO DIABETES EDUCATOR  IP CONSULT TO HOME CARE NEEDS    Physical examination on discharge day.   /72   Pulse 79   Temp 97.2 °F (36.2 °C) (Oral)   Resp 18   Ht 1.727 m (5' 8\")   Wt 87.3 kg (192 lb 7.4 oz)   SpO2 99%   BMI 29.26 kg/m²   General appearance.  Alert. Looks comfortable.  HEENT. Sclera clear. Moist mucus membranes.  Cardiovascular. Regular rate and rhythm, normal S1, S2. No murmur.   Respiratory. Not using accessory muscles.Clear to auscultation bilaterally, no wheeze.  Gastrointestinal. Abdomen soft, non-tender, not distended, normal bowel sounds  Neurology. Facial symmetry. No speech deficits. Moving all extremities equally.  Extremities. No edema in lower

## 2024-11-20 ENCOUNTER — TELEPHONE (OUTPATIENT)
Dept: CARDIOLOGY CLINIC | Age: 59
End: 2024-11-20

## 2024-11-20 ENCOUNTER — HOSPITAL ENCOUNTER (OUTPATIENT)
Age: 59
Discharge: HOME | End: 2024-11-20
Payer: MEDICARE

## 2024-11-20 LAB
ALBUMIN SERPL-MCNC: 3.4 G/DL (ref 3.4–5)
ALBUMIN/GLOB SERPL: 1.2 {RATIO} (ref 1.1–2.2)
ALP SERPL-CCNC: 103 U/L (ref 40–129)
ALT SERPL-CCNC: 13 U/L (ref 10–40)
ANION GAP SERPL CALCULATED.3IONS-SCNC: 17 MMOL/L (ref 3–16)
AST SERPL-CCNC: 21 U/L (ref 15–37)
BACTERIA URNS QL MICRO: ABNORMAL /HPF
BASOPHILS # BLD: 0 K/UL (ref 0–0.2)
BASOPHILS NFR BLD: 0.5 %
BILIRUB SERPL-MCNC: <0.2 MG/DL (ref 0–1)
BILIRUB UR QL STRIP.AUTO: NEGATIVE
BUN SERPL-MCNC: 39 MG/DL (ref 7–20)
CALCIUM SERPL-MCNC: 9.1 MG/DL (ref 8.3–10.6)
CHLORIDE SERPL-SCNC: 96 MMOL/L (ref 99–110)
CLARITY UR: CLEAR
CO2 SERPL-SCNC: 22 MMOL/L (ref 21–32)
COLOR UR: YELLOW
CREAT SERPL-MCNC: 6.1 MG/DL (ref 0.9–1.3)
DEPRECATED RDW RBC AUTO: 15.5 % (ref 12.4–15.4)
EOSINOPHIL # BLD: 0.1 K/UL (ref 0–0.6)
EOSINOPHIL NFR BLD: 2.2 %
EPI CELLS #/AREA URNS AUTO: 2 /HPF (ref 0–5)
GFR SERPLBLD CREATININE-BSD FMLA CKD-EPI: 10 ML/MIN/{1.73_M2}
GLUCOSE SERPL-MCNC: 164 MG/DL (ref 70–99)
GLUCOSE UR STRIP.AUTO-MCNC: 250 MG/DL
HCT VFR BLD AUTO: 41.3 % (ref 40.5–52.5)
HGB BLD-MCNC: 13.2 G/DL (ref 13.5–17.5)
HGB UR QL STRIP.AUTO: ABNORMAL
HYALINE CASTS #/AREA URNS AUTO: 0 /LPF (ref 0–8)
KETONES UR STRIP.AUTO-MCNC: NEGATIVE MG/DL
LEUKOCYTE ESTERASE UR QL STRIP.AUTO: ABNORMAL
LYMPHOCYTES # BLD: 1.6 K/UL (ref 1–5.1)
LYMPHOCYTES NFR BLD: 35.5 %
MCH RBC QN AUTO: 28.1 PG (ref 26–34)
MCHC RBC AUTO-ENTMCNC: 32 G/DL (ref 31–36)
MCV RBC AUTO: 88 FL (ref 80–100)
MONOCYTES # BLD: 0.5 K/UL (ref 0–1.3)
MONOCYTES NFR BLD: 10.4 %
NEUTROPHILS # BLD: 2.3 K/UL (ref 1.7–7.7)
NEUTROPHILS NFR BLD: 51.4 %
NITRITE UR QL STRIP.AUTO: NEGATIVE
PH UR STRIP.AUTO: 6.5 [PH] (ref 5–8)
PLATELET # BLD AUTO: 224 K/UL (ref 135–450)
PLATELET BLD QL SMEAR: ABNORMAL
PMV BLD AUTO: 10.2 FL (ref 5–10.5)
POTASSIUM SERPL-SCNC: 4 MMOL/L (ref 3.5–5.1)
PROT SERPL-MCNC: 6.3 G/DL (ref 6.4–8.2)
PROT UR STRIP.AUTO-MCNC: 100 MG/DL
RBC # BLD AUTO: 4.69 M/UL (ref 4.2–5.9)
RBC CLUMPS #/AREA URNS AUTO: 0 /HPF (ref 0–4)
SLIDE REVIEW: ABNORMAL
SODIUM SERPL-SCNC: 135 MMOL/L (ref 136–145)
SP GR UR STRIP.AUTO: >=1.03 (ref 1–1.03)
UA DIPSTICK W REFLEX MICRO PNL UR: YES
URN SPEC COLLECT METH UR: ABNORMAL
UROBILINOGEN UR STRIP-ACNC: 1 E.U./DL
WBC # BLD AUTO: 4.4 K/UL (ref 4–11)
WBC #/AREA URNS AUTO: 28 /HPF (ref 0–5)

## 2024-11-20 PROCEDURE — 81001 URINALYSIS AUTO W/SCOPE: CPT

## 2024-11-20 PROCEDURE — 85025 COMPLETE CBC W/AUTO DIFF WBC: CPT

## 2024-11-20 PROCEDURE — 80053 COMPREHEN METABOLIC PANEL: CPT

## 2024-11-20 RX ORDER — FERRIC CITRATE 210 MG/1
TABLET, COATED ORAL
COMMUNITY

## 2024-11-20 RX ORDER — CALCITRIOL 0.25 UG/1
0.25 CAPSULE, LIQUID FILLED ORAL DAILY
COMMUNITY

## 2024-11-20 NOTE — TELEPHONE ENCOUNTER
Pt dropped off form to be filled out for cardiac clearance.  It has been placed in [facility]'s mailbox. Please fax to number on the form, pt does not need a copy.  Thank you!

## 2024-11-22 NOTE — TELEPHONE ENCOUNTER
Phillip from [facility] pre admin calling requesting status of cardiac clearance as pt has surgery on Monday   Fax [phone]   Please advise  Thank you

## 2024-11-22 NOTE — TELEPHONE ENCOUNTER
He dropped off an H&P form. Dr Celestin can do a letter for cardiac risk assessment, but H&P will need to be done by PCP. Can fax over today AFTER [facility] signs

## 2024-11-22 NOTE — TELEPHONE ENCOUNTER
LMOM stating [facility] will send over CC but H&P needs completed by PCP. Advised to c/b with any questions.

## 2024-11-22 NOTE — PROGRESS NOTES
Called  and spoke with Magalys at [facility] concerning patient's cardiac clearance. She states that she can see where the message was sent on 11/20/24. Requested another message be sent as patient is having surgery on Monday and cardiac clearance has been requested per Dr. Lowe.

## 2024-11-25 ENCOUNTER — APPOINTMENT (OUTPATIENT)
Dept: GENERAL RADIOLOGY | Age: 59
End: 2024-11-25
Attending: PODIATRIST
Payer: MEDICARE

## 2024-11-25 ENCOUNTER — ANESTHESIA EVENT (OUTPATIENT)
Dept: OPERATING ROOM | Age: 59
End: 2024-11-25
Payer: MEDICARE

## 2024-11-25 ENCOUNTER — ANESTHESIA (OUTPATIENT)
Dept: OPERATING ROOM | Age: 59
End: 2024-11-25
Payer: MEDICARE

## 2024-11-25 ENCOUNTER — HOSPITAL ENCOUNTER (OUTPATIENT)
Age: 59
Setting detail: OUTPATIENT SURGERY
Discharge: HOME | End: 2024-11-25
Attending: PODIATRIST | Admitting: PODIATRIST
Payer: MEDICARE

## 2024-11-25 VITALS
HEIGHT: 68 IN | HEART RATE: 92 BPM | SYSTOLIC BLOOD PRESSURE: 139 MMHG | DIASTOLIC BLOOD PRESSURE: 66 MMHG | BODY MASS INDEX: 28.49 KG/M2 | OXYGEN SATURATION: 100 % | WEIGHT: 188 LBS | TEMPERATURE: 97.8 F | RESPIRATION RATE: 10 BRPM

## 2024-11-25 DIAGNOSIS — M86.9 OSTEOMYELITIS OF LEFT FOOT, UNSPECIFIED TYPE: ICD-10-CM

## 2024-11-25 DIAGNOSIS — L97.424 NON-PRESSURE CHRONIC ULCER OF LEFT HEEL AND MIDFOOT WITH NECROSIS OF BONE (HCC): ICD-10-CM

## 2024-11-25 DIAGNOSIS — M25.775 OSTEOPHYTE OF LEFT FOOT: ICD-10-CM

## 2024-11-25 DIAGNOSIS — M76.72 PERONEAL TENDINITIS, LEFT: ICD-10-CM

## 2024-11-25 LAB
ANION GAP SERPL CALCULATED.3IONS-SCNC: 13 MMOL/L (ref 3–16)
BUN SERPL-MCNC: 41 MG/DL (ref 7–20)
CALCIUM SERPL-MCNC: 8.3 MG/DL (ref 8.3–10.6)
CHLORIDE SERPL-SCNC: 92 MMOL/L (ref 99–110)
CO2 SERPL-SCNC: 28 MMOL/L (ref 21–32)
CREAT SERPL-MCNC: 8.4 MG/DL (ref 0.9–1.3)
GFR SERPLBLD CREATININE-BSD FMLA CKD-EPI: 7 ML/MIN/{1.73_M2}
GLUCOSE BLD-MCNC: 131 MG/DL (ref 70–99)
GLUCOSE BLD-MCNC: 243 MG/DL (ref 70–99)
GLUCOSE SERPL-MCNC: 143 MG/DL (ref 70–99)
PERFORMED ON: ABNORMAL
PERFORMED ON: ABNORMAL
POTASSIUM SERPL-SCNC: 3.4 MMOL/L (ref 3.5–5.1)
SODIUM SERPL-SCNC: 133 MMOL/L (ref 136–145)
VANCOMYCIN TROUGH SERPL-MCNC: 19.5 UG/ML (ref 10–20)

## 2024-11-25 PROCEDURE — 3600000014 HC SURGERY LEVEL 4 ADDTL 15MIN: Performed by: PODIATRIST

## 2024-11-25 PROCEDURE — 7100000001 HC PACU RECOVERY - ADDTL 15 MIN: Performed by: PODIATRIST

## 2024-11-25 PROCEDURE — 6360000002 HC RX W HCPCS: Performed by: STUDENT IN AN ORGANIZED HEALTH CARE EDUCATION/TRAINING PROGRAM

## 2024-11-25 PROCEDURE — 6360000002 HC RX W HCPCS: Performed by: NURSE ANESTHETIST, CERTIFIED REGISTERED

## 2024-11-25 PROCEDURE — 88311 DECALCIFY TISSUE: CPT

## 2024-11-25 PROCEDURE — 6370000000 HC RX 637 (ALT 250 FOR IP): Performed by: STUDENT IN AN ORGANIZED HEALTH CARE EDUCATION/TRAINING PROGRAM

## 2024-11-25 PROCEDURE — 7100000010 HC PHASE II RECOVERY - FIRST 15 MIN: Performed by: PODIATRIST

## 2024-11-25 PROCEDURE — 3700000001 HC ADD 15 MINUTES (ANESTHESIA): Performed by: PODIATRIST

## 2024-11-25 PROCEDURE — 80048 BASIC METABOLIC PNL TOTAL CA: CPT

## 2024-11-25 PROCEDURE — 2500000003 HC RX 250 WO HCPCS: Performed by: NURSE ANESTHETIST, CERTIFIED REGISTERED

## 2024-11-25 PROCEDURE — 3700000000 HC ANESTHESIA ATTENDED CARE: Performed by: PODIATRIST

## 2024-11-25 PROCEDURE — 7100000011 HC PHASE II RECOVERY - ADDTL 15 MIN: Performed by: PODIATRIST

## 2024-11-25 PROCEDURE — 2580000003 HC RX 258: Performed by: PODIATRIST

## 2024-11-25 PROCEDURE — C1713 ANCHOR/SCREW BN/BN,TIS/BN: HCPCS | Performed by: PODIATRIST

## 2024-11-25 PROCEDURE — 88304 TISSUE EXAM BY PATHOLOGIST: CPT

## 2024-11-25 PROCEDURE — 3600000004 HC SURGERY LEVEL 4 BASE: Performed by: PODIATRIST

## 2024-11-25 PROCEDURE — 64445 NJX AA&/STRD SCIATIC NRV IMG: CPT | Performed by: STUDENT IN AN ORGANIZED HEALTH CARE EDUCATION/TRAINING PROGRAM

## 2024-11-25 PROCEDURE — A4217 STERILE WATER/SALINE, 500 ML: HCPCS | Performed by: PODIATRIST

## 2024-11-25 PROCEDURE — 36415 COLL VENOUS BLD VENIPUNCTURE: CPT

## 2024-11-25 PROCEDURE — 7100000000 HC PACU RECOVERY - FIRST 15 MIN: Performed by: PODIATRIST

## 2024-11-25 PROCEDURE — 2720000010 HC SURG SUPPLY STERILE: Performed by: PODIATRIST

## 2024-11-25 PROCEDURE — 2709999900 HC NON-CHARGEABLE SUPPLY: Performed by: PODIATRIST

## 2024-11-25 PROCEDURE — 73630 X-RAY EXAM OF FOOT: CPT

## 2024-11-25 PROCEDURE — 80202 ASSAY OF VANCOMYCIN: CPT

## 2024-11-25 PROCEDURE — 6360000002 HC RX W HCPCS: Performed by: PODIATRIST

## 2024-11-25 PROCEDURE — 6360000002 HC RX W HCPCS

## 2024-11-25 DEVICE — PATCH AMNION 2 LAYR PROTCT 4 X 4CM STERISHIELD II: Type: IMPLANTABLE DEVICE | Site: FOOT | Status: FUNCTIONAL

## 2024-11-25 DEVICE — ANCHOR SUTURE STRL CITREFIX XPRESS: Type: IMPLANTABLE DEVICE | Site: FOOT | Status: FUNCTIONAL

## 2024-11-25 DEVICE — SONICANCHOR KIT
Type: IMPLANTABLE DEVICE | Site: FOOT | Status: FUNCTIONAL
Brand: SONICANCHOR

## 2024-11-25 RX ORDER — ESMOLOL HYDROCHLORIDE 10 MG/ML
INJECTION INTRAVENOUS
Status: DISCONTINUED | OUTPATIENT
Start: 2024-11-25 | End: 2024-11-25 | Stop reason: SDUPTHER

## 2024-11-25 RX ORDER — ROPIVACAINE HYDROCHLORIDE 5 MG/ML
INJECTION, SOLUTION EPIDURAL; INFILTRATION; PERINEURAL
Status: COMPLETED | OUTPATIENT
Start: 2024-11-25 | End: 2024-11-25

## 2024-11-25 RX ORDER — OXYCODONE HYDROCHLORIDE 5 MG/1
10 TABLET ORAL PRN
Status: COMPLETED | OUTPATIENT
Start: 2024-11-25 | End: 2024-11-25

## 2024-11-25 RX ORDER — METOCLOPRAMIDE HYDROCHLORIDE 5 MG/ML
10 INJECTION INTRAMUSCULAR; INTRAVENOUS
Status: DISCONTINUED | OUTPATIENT
Start: 2024-11-25 | End: 2024-11-25 | Stop reason: HOSPADM

## 2024-11-25 RX ORDER — FENTANYL CITRATE 50 UG/ML
INJECTION, SOLUTION INTRAMUSCULAR; INTRAVENOUS
Status: DISCONTINUED | OUTPATIENT
Start: 2024-11-25 | End: 2024-11-25 | Stop reason: SDUPTHER

## 2024-11-25 RX ORDER — PROPOFOL 10 MG/ML
INJECTION, EMULSION INTRAVENOUS
Status: DISCONTINUED | OUTPATIENT
Start: 2024-11-25 | End: 2024-11-25 | Stop reason: SDUPTHER

## 2024-11-25 RX ORDER — OXYCODONE HYDROCHLORIDE 5 MG/1
5 TABLET ORAL PRN
Status: COMPLETED | OUTPATIENT
Start: 2024-11-25 | End: 2024-11-25

## 2024-11-25 RX ORDER — MIDAZOLAM HYDROCHLORIDE 2 MG/2ML
2 INJECTION, SOLUTION INTRAMUSCULAR; INTRAVENOUS ONCE
Status: COMPLETED | OUTPATIENT
Start: 2024-11-25 | End: 2024-11-25

## 2024-11-25 RX ORDER — SODIUM CHLORIDE 9 MG/ML
INJECTION, SOLUTION INTRAVENOUS PRN
Status: DISCONTINUED | OUTPATIENT
Start: 2024-11-25 | End: 2024-11-25 | Stop reason: HOSPADM

## 2024-11-25 RX ORDER — LIDOCAINE HYDROCHLORIDE 20 MG/ML
INJECTION, SOLUTION INFILTRATION; PERINEURAL
Status: DISCONTINUED | OUTPATIENT
Start: 2024-11-25 | End: 2024-11-25 | Stop reason: SDUPTHER

## 2024-11-25 RX ORDER — HYDROMORPHONE HYDROCHLORIDE 2 MG/ML
0.5 INJECTION, SOLUTION INTRAMUSCULAR; INTRAVENOUS; SUBCUTANEOUS EVERY 5 MIN PRN
Status: COMPLETED | OUTPATIENT
Start: 2024-11-25 | End: 2024-11-25

## 2024-11-25 RX ORDER — MAGNESIUM HYDROXIDE 1200 MG/15ML
LIQUID ORAL CONTINUOUS PRN
Status: COMPLETED | OUTPATIENT
Start: 2024-11-25 | End: 2024-11-25

## 2024-11-25 RX ORDER — METOPROLOL TARTRATE 1 MG/ML
INJECTION, SOLUTION INTRAVENOUS
Status: DISCONTINUED | OUTPATIENT
Start: 2024-11-25 | End: 2024-11-25 | Stop reason: SDUPTHER

## 2024-11-25 RX ORDER — LIDOCAINE HYDROCHLORIDE 10 MG/ML
0.5 INJECTION, SOLUTION EPIDURAL; INFILTRATION; INTRACAUDAL; PERINEURAL ONCE
Status: DISCONTINUED | OUTPATIENT
Start: 2024-11-25 | End: 2024-11-25 | Stop reason: HOSPADM

## 2024-11-25 RX ORDER — PHENYLEPHRINE HCL IN 0.9% NACL 1 MG/10 ML
SYRINGE (ML) INTRAVENOUS
Status: DISCONTINUED | OUTPATIENT
Start: 2024-11-25 | End: 2024-11-25 | Stop reason: SDUPTHER

## 2024-11-25 RX ORDER — SODIUM CHLORIDE 0.9 % (FLUSH) 0.9 %
5-40 SYRINGE (ML) INJECTION EVERY 12 HOURS SCHEDULED
Status: DISCONTINUED | OUTPATIENT
Start: 2024-11-25 | End: 2024-11-25 | Stop reason: HOSPADM

## 2024-11-25 RX ORDER — ONDANSETRON 2 MG/ML
4 INJECTION INTRAMUSCULAR; INTRAVENOUS
Status: DISCONTINUED | OUTPATIENT
Start: 2024-11-25 | End: 2024-11-25 | Stop reason: HOSPADM

## 2024-11-25 RX ORDER — SODIUM CHLORIDE 9 MG/ML
INJECTION, SOLUTION INTRAVENOUS CONTINUOUS
Status: DISCONTINUED | OUTPATIENT
Start: 2024-11-25 | End: 2024-11-25 | Stop reason: HOSPADM

## 2024-11-25 RX ORDER — NALOXONE HYDROCHLORIDE 0.4 MG/ML
INJECTION, SOLUTION INTRAMUSCULAR; INTRAVENOUS; SUBCUTANEOUS PRN
Status: DISCONTINUED | OUTPATIENT
Start: 2024-11-25 | End: 2024-11-25 | Stop reason: HOSPADM

## 2024-11-25 RX ORDER — ROCURONIUM BROMIDE 10 MG/ML
INJECTION, SOLUTION INTRAVENOUS
Status: DISCONTINUED | OUTPATIENT
Start: 2024-11-25 | End: 2024-11-25 | Stop reason: SDUPTHER

## 2024-11-25 RX ORDER — FENTANYL CITRATE 50 UG/ML
INJECTION, SOLUTION INTRAMUSCULAR; INTRAVENOUS
Status: COMPLETED
Start: 2024-11-25 | End: 2024-11-25

## 2024-11-25 RX ORDER — SODIUM CHLORIDE 0.9 % (FLUSH) 0.9 %
5-40 SYRINGE (ML) INJECTION PRN
Status: DISCONTINUED | OUTPATIENT
Start: 2024-11-25 | End: 2024-11-25 | Stop reason: HOSPADM

## 2024-11-25 RX ORDER — ONDANSETRON 2 MG/ML
INJECTION INTRAMUSCULAR; INTRAVENOUS
Status: DISCONTINUED | OUTPATIENT
Start: 2024-11-25 | End: 2024-11-25 | Stop reason: SDUPTHER

## 2024-11-25 RX ORDER — FENTANYL CITRATE 50 UG/ML
25 INJECTION, SOLUTION INTRAMUSCULAR; INTRAVENOUS EVERY 5 MIN PRN
Status: DISCONTINUED | OUTPATIENT
Start: 2024-11-25 | End: 2024-11-25 | Stop reason: HOSPADM

## 2024-11-25 RX ADMIN — FENTANYL CITRATE 25 MCG: 50 INJECTION INTRAMUSCULAR; INTRAVENOUS at 12:28

## 2024-11-25 RX ADMIN — SUGAMMADEX 400 MG: 100 INJECTION, SOLUTION INTRAVENOUS at 11:45

## 2024-11-25 RX ADMIN — ONDANSETRON 4 MG: 2 INJECTION, SOLUTION INTRAMUSCULAR; INTRAVENOUS at 11:01

## 2024-11-25 RX ADMIN — ROPIVACAINE HYDROCHLORIDE 25 ML: 5 INJECTION, SOLUTION EPIDURAL; INFILTRATION; PERINEURAL at 10:21

## 2024-11-25 RX ADMIN — METOPROLOL TARTRATE 2.5 MG: 5 INJECTION INTRAVENOUS at 11:04

## 2024-11-25 RX ADMIN — MIDAZOLAM 2 MG: 1 INJECTION INTRAMUSCULAR; INTRAVENOUS at 10:15

## 2024-11-25 RX ADMIN — NITROGLYCERIN 1 INCH: 20 OINTMENT TOPICAL at 10:44

## 2024-11-25 RX ADMIN — LIDOCAINE HYDROCHLORIDE 100 MG: 20 INJECTION, SOLUTION INFILTRATION; PERINEURAL at 10:56

## 2024-11-25 RX ADMIN — FENTANYL CITRATE 50 MCG: 50 INJECTION, SOLUTION INTRAMUSCULAR; INTRAVENOUS at 10:54

## 2024-11-25 RX ADMIN — Medication 100 MCG: at 11:30

## 2024-11-25 RX ADMIN — HYDROMORPHONE HYDROCHLORIDE 0.5 MG: 2 INJECTION, SOLUTION INTRAMUSCULAR; INTRAVENOUS; SUBCUTANEOUS at 12:15

## 2024-11-25 RX ADMIN — Medication 100 MCG: at 11:24

## 2024-11-25 RX ADMIN — Medication 100 MCG: at 11:27

## 2024-11-25 RX ADMIN — PROPOFOL 200 MG: 10 INJECTION, EMULSION INTRAVENOUS at 10:56

## 2024-11-25 RX ADMIN — ESMOLOL HYDROCHLORIDE 10 MG: 100 INJECTION, SOLUTION INTRAVENOUS at 11:01

## 2024-11-25 RX ADMIN — CEFAZOLIN 2000 MG: 2 INJECTION, POWDER, FOR SOLUTION INTRAMUSCULAR; INTRAVENOUS at 11:05

## 2024-11-25 RX ADMIN — FENTANYL CITRATE 25 MCG: 50 INJECTION, SOLUTION INTRAMUSCULAR; INTRAVENOUS at 12:28

## 2024-11-25 RX ADMIN — ROCURONIUM BROMIDE 50 MG: 10 INJECTION, SOLUTION INTRAVENOUS at 10:56

## 2024-11-25 RX ADMIN — Medication 100 MCG: at 11:37

## 2024-11-25 RX ADMIN — Medication 100 MCG: at 11:34

## 2024-11-25 RX ADMIN — OXYCODONE 5 MG: 5 TABLET ORAL at 12:53

## 2024-11-25 RX ADMIN — HYDROMORPHONE HYDROCHLORIDE 0.5 MG: 2 INJECTION, SOLUTION INTRAMUSCULAR; INTRAVENOUS; SUBCUTANEOUS at 12:20

## 2024-11-25 RX ADMIN — SODIUM CHLORIDE: 9 INJECTION, SOLUTION INTRAVENOUS at 09:41

## 2024-11-25 ASSESSMENT — PAIN DESCRIPTION - LOCATION
LOCATION: FOOT

## 2024-11-25 ASSESSMENT — PAIN DESCRIPTION - FREQUENCY
FREQUENCY: INTERMITTENT
FREQUENCY: CONTINUOUS
FREQUENCY: INTERMITTENT

## 2024-11-25 ASSESSMENT — PAIN SCALES - GENERAL
PAINLEVEL_OUTOF10: 4
PAINLEVEL_OUTOF10: 5
PAINLEVEL_OUTOF10: 4
PAINLEVEL_OUTOF10: 5
PAINLEVEL_OUTOF10: 5
PAINLEVEL_OUTOF10: 6
PAINLEVEL_OUTOF10: 4
PAINLEVEL_OUTOF10: 7
PAINLEVEL_OUTOF10: 5
PAINLEVEL_OUTOF10: 2

## 2024-11-25 ASSESSMENT — PAIN DESCRIPTION - ORIENTATION
ORIENTATION: LEFT

## 2024-11-25 ASSESSMENT — PAIN DESCRIPTION - DESCRIPTORS
DESCRIPTORS: THROBBING
DESCRIPTORS: ACHING
DESCRIPTORS: THROBBING
DESCRIPTORS: THROBBING

## 2024-11-25 ASSESSMENT — PAIN DESCRIPTION - ONSET
ONSET: ON-GOING
ONSET: AWAKENED FROM SLEEP
ONSET: ON-GOING
ONSET: ON-GOING

## 2024-11-25 ASSESSMENT — PAIN DESCRIPTION - PAIN TYPE
TYPE: SURGICAL PAIN

## 2024-11-25 ASSESSMENT — PAIN - FUNCTIONAL ASSESSMENT: PAIN_FUNCTIONAL_ASSESSMENT: 0-10

## 2024-11-25 NOTE — PROGRESS NOTES
Time out done, 02 on @ 2 l/min per n/c, then   IV given, then Dr Hicks completed left leg nerve blocks, patient tolerated procedures well. Stated nervousness causes level 1 on 0-10 pain scale sternal discomfort & Dr Hicks informed. Vital signs stable. Skin warm & dry. Wife @ bedside after nerve block.

## 2024-11-25 NOTE — ANESTHESIA PROCEDURE NOTES
Peripheral Block    Patient location during procedure: pre-op  Reason for block: post-op pain management and at surgeon's request  Start time: 11/25/2024 10:11 AM  End time: 11/25/2024 10:21 AM  Staffing  Performed: anesthesiologist   Anesthesiologist: Sebastian Hicks DO  Performed by: Sebastian Hicks DO  Authorized by: Sebastian Hicks DO    Preanesthetic Checklist  Completed: patient identified, IV checked, site marked, risks and benefits discussed, surgical/procedural consents, equipment checked, pre-op evaluation, timeout performed, anesthesia consent given, oxygen available, monitors applied/VS acknowledged, fire risk safety assessment completed and verbalized and blood product R/B/A discussed and consented  Peripheral Block   Patient position: right lateral decubitus  Prep: ChloraPrep  Provider prep: mask and sterile gloves  Patient monitoring: continuous pulse ox, frequent blood pressure checks, IV access and oxygen  Block type: Sciatic and Saphenous  Laterality: left  Injection technique: single-shot  Guidance: ultrasound guided    Needle   Needle gauge: 20 G  Needle localization: ultrasound guidance  Needle length: 5 cm  Assessment   Injection assessment: negative aspiration for heme  Paresthesia pain: none  Slow fractionated injection: yes  Hemodynamics: stable    Medications Administered  ropivacaine (NAROPIN) injection 0.5% - Perineural   25 mL - 11/25/2024 10:21:00 AM

## 2024-11-25 NOTE — BRIEF OP NOTE
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) [L97.424]    Post-Op Diagnosis: Same       Procedure(s):  28122 - FIFTH METATARSAL OSTECTOMY-LEFT FOOT  20240 - BONE BIOPSY-LEFT FOOT  27658 - REPAIR OF BREVIS TENDON-LEFT FOOT  58425 - COMPLEX WOUND CLOSURE-LEFT FOOT  54939 - APPLICATION BELOW KNEE SPLINT-LEFT LOWER LIMB    Surgeon(s):  David Lowe DPM    Assistant:  Resident: Ema Albarran DPM and Jeffery Villela, PGY-3  Student: Samuel Pan, MS4    Anesthesia: General With popliteal block    Hemostasis: anatomic dissection and electrocautery, pneumatic ankle tourniquet at 250 mmHg for 31 minutes    Injectables: None    Materials: 3-0/4-0 Vicryl, 3-0 nylon    Implants:   -(1x) 3.5 x 15.5mm Citrefix suture anchor with 2-0 Force Fiber    Estimated Blood Loss (mL): Minimal    Complications: None    Specimens:   ID Type Source Tests Collected by Time Destination   A : A. LEFT FIFTH METATARSAL Tissue Tissue SURGICAL PATHOLOGY David Lowe DPM 11/25/2024 1132        Implants:  Implant Name Type Inv. Item Serial No.  Lot No. LRB No. Used Action   ANCHOR SUTURE STRL CITREFIX XPRESS - [id number]  ANCHOR SUTURE STRL CITREFIX XPRESS  [employer] [facility] [id number] Left 1 Implanted   KIT SONIC ANCHOR FORCE FIB 2.5X10MM 2 0 - [id number]  KIT SONIC ANCHOR FORCE FIB 2.5X10MM 2 0  [employer] [facility] [id number] Left 2 Implanted   PATCH AMNION 2 LAYR PROTCT 4 X 4CM STERISHIELD II - [id number]  PATCH AMNION 2 LAYR PROTCT 4 X 4CM STERISHIELD II [id number] [employer]-WD  Left 1 Implanted         Drains: * No LDAs found

## 2024-11-25 NOTE — H&P
Date of Surgery Update:    Te Garay  was seen, history and physical examination reviewed, and patient examined by me today. There have been no significant clinical changes since the completion of the previous history and physical.     The nature of the procedure, possible complications, alternative forms of therapy, post-op course, and post-op goals have been explained to patient (or appropriate guardian) and patient's family and understanding was verbalized.  All questions have been answered. The consent has been signed.  Patient's surgical site has been marked. Patient wishes to proceed with surgery.     Discussed with SHARA Shen DPM  Podiatric Resident PGY-3  Pager [phone] or Perfect Serve

## 2024-11-25 NOTE — DISCHARGE INSTRUCTIONS
[facility]   [address]  [place], [place] [id number] [phone]    Dr. David Lowe                                             Post Operative Instructions    1.  Have Prescriptions filled and take as directed.  All medications should be taken with food or milk.    2.  Keep foot elevated six inches above the level of the heart.  Support feet, legs, and knees with pillows.    3.  KEEP FOOT ELEVATED AS MUCH AS POSSIBLE UNTIL YOUR NEXT VISIT    4.  Place an ice pack on the bandaged site for 15 minutes every hour while awake    5.  Keep dressing clean, dry, and intact.  DO NOT REMOVE DRESSING.  CALL THE OFFICE IF BANDAGE COMES OFF.    6.  For the first 7 days after surgery, take temperature by mouth three times a day.  Call the office if greater than 101F.    7.  Nonweightbearing to left foot    8.  All instructions are to be followed until otherwise instructed by your surgeon.    9.  Call our office if you have any concerns or questions which arise.  Our phones are answered 24 hours a day.  [phone]    10.  Your first post-operative appointment is scheduled, call the office for appointment date and time if not known prior to today.      ORTHOPEDIC/PODIATRY DISCHARGE INSTRUCTIONS    Follow your surgeons instructions.  Make follow-up appointment.  Observe operative area for signs of excessive bleeding such as a slow general ooze that saturates the dressing or bright red bleeding. In either case, apply pressure to the area and elevate if possible and call your surgeon right away.  Observe the affected extremity for circulation or nerve impairment such as a change in color, numbness, tingling, coldness or increased pain. If any of these symptoms are present call your surgeon.  Observe operative site for any signs of infection such as increased pain, redness, fever greater than 101 degrees, swelling, foul odor or drainage. Contact surgeon if any of these symptoms are

## 2024-11-25 NOTE — ANESTHESIA PRE PROCEDURE
Reported on 11/20/2024 11/4/24   Yves Castillo MD   Continuous Glucose Sensor (DEXCOM G7 SENSOR) MISC Every 10 days 10/24/24   Yves Castillo MD   vitamin D (ERGOCALCIFEROL) 1.25 MG (73751 UT) CAPS capsule Take 1 capsule by mouth once a week    Provider, MD Yovany   clopidogrel (PLAVIX) 75 MG tablet Take 1 tablet by mouth daily  Patient taking differently: Take 1 tablet by mouth daily Will not take 11/22, 11/23. 11/24 8/15/24   Shania Boykin, JAYLEN - CNP   levETIRAcetam (KEPPRA) 500 MG tablet Take 1 tablet by mouth daily  Patient not taking: Reported on 11/20/2024 7/22/24   Vega Blanchard DO   rosuvastatin (CRESTOR) 40 MG tablet Take 1 tablet by mouth every evening 5/2/24 11/10/24  Jeyson Fernandez MD   insulin lispro (HUMALOG) 100 UNIT/ML SOLN injection vial Use 30-40 units daily via pump  Patient taking differently: by Insulin Pump - SubCUTAneous Infusion route Use 30-40 units daily via pump 4/29/24   Yves Castillo MD       Current medications:    Current Facility-Administered Medications   Medication Dose Route Frequency Provider Last Rate Last Admin    0.9 % sodium chloride infusion   IntraVENous Continuous David Lowe DPM 50 mL/hr at 11/25/24 1008 NoRateChange at 11/25/24 1008    lidocaine PF 1 % injection 0.5 mL  0.5 mL IntraDERmal Once David Lowe DPM        ceFAZolin (ANCEF) 2,000 mg in sterile water 20 mL IV syringe  2,000 mg IntraVENous On Call to OR David Lowe DPM           Allergies:    Allergies   Allergen Reactions    Iodides Anaphylaxis    Shellfish-Derived Products Anaphylaxis    Morphine Hives and Itching    Lipitor [Atorvastatin] Rash     Rash on head and neck       Problem List:    Patient Active Problem List   Diagnosis Code    HLD (hyperlipidemia) E78.5    PVD (peripheral vascular disease) ([facility]) I73.9    Chronic systolic heart failure ([facility]) I50.22    ESRD on peritoneal dialysis ([facility]) N18.6, Z99.2    Septicemia ([facility]) A41.9    Generalized  PRINCIPAL DISCHARGE DIAGNOSIS  Diagnosis: DKA, type 2  Assessment and Plan of Treatment: You came to the hospital with DKA/HHS secondary to uncontrolled diabetes II. Endocrinology was consulted. You were started on insulin drip and then switched to insulin injections when you were tolerating po intake. Your blood glucose was under control and you are asymptomatic. You are stable for discharge. Please take medications as prescribed and follow up with your PCP for adjustment.      SECONDARY DISCHARGE DIAGNOSES  Diagnosis: Suspected chronic obstructive pulmonary disease based on initial evaluation  Assessment and Plan of Treatment: You were found to have suspected COPD. You were started on duonebs and prednisone. Please follow up outpatient clinic for official diagnosis. Please complete the prednisone taper as prescribed.     PRINCIPAL DISCHARGE DIAGNOSIS  Diagnosis: DKA, type 2  Assessment and Plan of Treatment: You came to the hospital with DKA/HHS secondary to uncontrolled diabetes II. Endocrinology was consulted. You were started on insulin drip and then switched to insulin injections when you were tolerating po intake. Your blood glucose was under control and you are asymptomatic. During your stay you developed bleeding form your stomach that was treated. You also developed appendicits that resulted in bacteria moving into your blood, we gave you the required antibiotics and you are now stable. You also developed a wound on your right forearm that needed surgical intervention which was completed with no complications. You are stable for discharge. Please take medications as prescribed and follow up with your PCP,endorinologist, gastroenterologist, and surgery doctors. for adjustment.     PRINCIPAL DISCHARGE DIAGNOSIS  Diagnosis: DKA, type 2  Assessment and Plan of Treatment: You came to the hospital with DKA/HHS secondary to uncontrolled diabetes II. Endocrinology was consulted. You were started on insulin drip and then switched to insulin injections when you were tolerating po intake. Your blood glucose was under control and you are asymptomatic. During your stay you developed bleeding form your stomach that was treated. You also developed appendicits that resulted in bacteria moving into your blood, we gave you the required antibiotics and you are now stable. You also developed a wound on your right forearm that needed surgical intervention which was completed with no complications. You are stable for discharge.  Your blood pressure was low during this hospital admission, we recommend holding off on hypertension medication at this time and follow up with primary. Also holding Flomax medication at this time, no signs of urinary retention, please follow up out-patient for re-evlauation if this is needed. Please take medications as prescribed and follow up with your PCP,endorinologist, gastroenterologist, and surgery doctors. for adjustment.

## 2024-11-25 NOTE — ANESTHESIA POSTPROCEDURE EVALUATION
Department of Anesthesiology  Postprocedure Note    Patient: Te Garay  MRN: [id number]  YOB: 1965  Date of evaluation: 11/25/2024    Procedure Summary       Date: 11/25/24 Room / Location: [address]    Anesthesia Start: 1050 Anesthesia Stop: 1200    Procedures:       FIFTH METATARSAL OSTECTOMY-LEFT FOOT; BONE BIOPSY-LEFT FOOT-POPLITEAL BLOCK, SAPHENOUS-MARTINA (Left: Foot)      REPAIR OF BREVIS TENDON-LEFT FOOT; COMPLEX WOUND CLOSURE-LEFT FOOT; APPLICATION BELOW KNEE SPLINT-LEFT LOWER LIMB (Left: Ankle) Diagnosis:       Osteophyte of left foot      Peroneal tendinitis, left      Osteomyelitis of left foot, unspecified type      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    Mary Phase I: Mary Score: 7    Mary Phase II:      Anesthesia Post Evaluation    Patient location during evaluation: PACU  Patient participation: complete - patient participated  Level of consciousness: awake and alert  Airway patency: patent  Nausea & Vomiting: no nausea  Cardiovascular status: hemodynamically stable  Respiratory status: acceptable  Hydration status: stable  Pain management: adequate    No notable events documented.

## 2024-11-26 NOTE — OP NOTE
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) [L97.424]    Post-Op Diagnosis: Same       Procedure(s):  28122 - FIFTH METATARSAL OSTECTOMY-LEFT FOOT  20240 - BONE BIOPSY-LEFT FOOT  27658 - REPAIR OF BREVIS TENDON-LEFT FOOT  79040 - COMPLEX WOUND CLOSURE-LEFT FOOT  07342 - APPLICATION BELOW KNEE SPLINT-LEFT LOWER LIMB    Surgeon(s):  David Lowe DPM    Assistant:  Resident: Ema Albarran DPM and Jeffery Villela, PGY-3  Student: Samuel Pan, MS4     Anesthesia: General With popliteal block     Hemostasis: anatomic dissection and electrocautery, pneumatic ankle tourniquet at 250 mmHg for 31 minutes     Injectables: None     Materials: 3-0/4-0 Vicryl, 3-0 nylon     Implants:   -(1x) 3.5 x 15.5mm Citrefix suture anchor with 2-0 Force Fiber     Estimated Blood Loss (mL): Minimal    Complications: None    Specimens:   ID Type Source Tests Collected by Time Destination   A : A. LEFT FIFTH METATARSAL Tissue Tissue SURGICAL PATHOLOGY David Lowe DPM 11/25/2024 1132        Implants:  Implant Name Type Inv. Item Serial No.  Lot No. LRB No. Used Action   ANCHOR SUTURE STRL CITREFIX XPRESS - [id number]  ANCHOR SUTURE STRL CITREFIX XPRESS  [employer] [facility] [id number] Left 1 Implanted   KIT SONIC ANCHOR FORCE FIB 2.5X10MM 2 0 - [id number]  KIT SONIC ANCHOR FORCE FIB 2.5X10MM 2 0  [employer] [facility] [id number] Left 2 Implanted   PATCH AMNION 2 LAYR PROTCT 4 X 4CM STERISHIELD II - [id number]  PATCH AMNION 2 LAYR PROTCT 4 X 4CM STERISHIELD II [id number] [employer]  Left 1 Implanted         Drains: * No LDAs found *    Findings:  Infection

## 2024-11-29 ENCOUNTER — TELEPHONE (OUTPATIENT)
Dept: CARDIOLOGY CLINIC | Age: 59
End: 2024-11-29

## 2024-11-29 NOTE — TELEPHONE ENCOUNTER
Wife states pt had episode of chest pain while in [place] and currently having testing to see if cath is needed. Pt would like to see if [facility] could review hosp info and give his opinion. Pt is admitted to [facility]  phone [phone].    Please call wife Sachi to discuss

## 2024-11-29 NOTE — TELEPHONE ENCOUNTER
Spoke with the patient wife and patient and advised them of the message below. They voiced understanding. Call complete

## 2024-11-29 NOTE — TELEPHONE ENCOUNTER
Spoke to pt's wife and she stated that the pt had a stress test and ECHO and they are trying to see if the pt needs a cath. Pt's wife states that the pt wants to know if [facility] would think if he should do the cath. I informed her that [facility] is out until Monday. She wanted to know I anyone else would be able to suggest if he should or should not get the cath.

## 2024-11-29 NOTE — TELEPHONE ENCOUNTER
There is no one here today that has been familiar with him previously or been involved with his previous caths. They would defer management to the providing Cardiologist at that hospital.

## 2024-11-29 NOTE — TELEPHONE ENCOUNTER
Please make Sachi aware that Dr Celestin is not in the office until Monday and at that time he can review. It does look like we can see some of the notes/testing so I will have him look Monday.

## 2024-12-07 ENCOUNTER — CLAIMS CREATED FROM THE CLAIM WINDOW (OUTPATIENT)
Dept: URBAN - METROPOLITAN AREA MEDICAL CENTER 31 | Facility: MEDICAL CENTER | Age: 59
End: 2024-12-07
Payer: MEDICARE

## 2024-12-07 DIAGNOSIS — R11.2 NAUSEA AND VOMITING: ICD-10-CM

## 2024-12-07 DIAGNOSIS — R07.89 CHEST PAIN: ICD-10-CM

## 2024-12-07 PROCEDURE — 99254 IP/OBS CNSLTJ NEW/EST MOD 60: CPT | Performed by: INTERNAL MEDICINE

## 2024-12-07 PROCEDURE — 99222 1ST HOSP IP/OBS MODERATE 55: CPT | Performed by: INTERNAL MEDICINE

## 2024-12-07 PROCEDURE — G8427 DOCREV CUR MEDS BY ELIG CLIN: HCPCS | Performed by: INTERNAL MEDICINE

## 2024-12-10 ENCOUNTER — OFFICE VISIT (OUTPATIENT)
Dept: CARDIOLOGY CLINIC | Age: 59
End: 2024-12-10
Payer: MEDICARE

## 2024-12-10 VITALS
SYSTOLIC BLOOD PRESSURE: 124 MMHG | HEART RATE: 88 BPM | HEIGHT: 68 IN | BODY MASS INDEX: 30.16 KG/M2 | DIASTOLIC BLOOD PRESSURE: 68 MMHG | WEIGHT: 199 LBS | OXYGEN SATURATION: 99 %

## 2024-12-10 DIAGNOSIS — I25.83 CORONARY ARTERY DISEASE DUE TO LIPID RICH PLAQUE: Primary | ICD-10-CM

## 2024-12-10 DIAGNOSIS — I50.22 CHRONIC SYSTOLIC HEART FAILURE (HCC): ICD-10-CM

## 2024-12-10 DIAGNOSIS — I25.5 ISCHEMIC CARDIOMYOPATHY: ICD-10-CM

## 2024-12-10 DIAGNOSIS — I25.10 CORONARY ARTERY DISEASE DUE TO LIPID RICH PLAQUE: Primary | ICD-10-CM

## 2024-12-10 DIAGNOSIS — I10 HTN (HYPERTENSION), BENIGN: ICD-10-CM

## 2024-12-10 PROCEDURE — G8417 CALC BMI ABV UP PARAM F/U: HCPCS | Performed by: NURSE PRACTITIONER

## 2024-12-10 PROCEDURE — 99214 OFFICE O/P EST MOD 30 MIN: CPT | Performed by: NURSE PRACTITIONER

## 2024-12-10 PROCEDURE — G8484 FLU IMMUNIZE NO ADMIN: HCPCS | Performed by: NURSE PRACTITIONER

## 2024-12-10 PROCEDURE — 1036F TOBACCO NON-USER: CPT | Performed by: NURSE PRACTITIONER

## 2024-12-10 PROCEDURE — 3017F COLORECTAL CA SCREEN DOC REV: CPT | Performed by: NURSE PRACTITIONER

## 2024-12-10 PROCEDURE — G8427 DOCREV CUR MEDS BY ELIG CLIN: HCPCS | Performed by: NURSE PRACTITIONER

## 2024-12-10 PROCEDURE — 3078F DIAST BP <80 MM HG: CPT | Performed by: NURSE PRACTITIONER

## 2024-12-10 PROCEDURE — 3074F SYST BP LT 130 MM HG: CPT | Performed by: NURSE PRACTITIONER

## 2024-12-10 PROCEDURE — 1111F DSCHRG MED/CURRENT MED MERGE: CPT | Performed by: NURSE PRACTITIONER

## 2024-12-10 RX ORDER — CLOPIDOGREL BISULFATE 75 MG/1
75 TABLET ORAL DAILY
COMMUNITY

## 2024-12-10 NOTE — PROGRESS NOTES
[facility]     Outpatient Follow Up Note    CHIEF COMPLAINT / HPI: Hospital Follow Up secondary to NSTEMI : severe instent stenosis distal SVG > anastomosis of the OM >> IVUS guided PCI 12/2/24    Hospital record has been reviewed  Hospital Course progressed as follows per discharge summary: OSH: 11/28 - 12/8/24    60 yo male with hx of CAD status post PCI, CABG x 3, ischemic cardiomyopathy EF of 40%, seizure, end-stage renal disease on PD, diabetes type 1, hypertension visiting from [place]     ~ admitted for NSTEMI. S/p successful IVUS guided PCI with a 3.5 x 15 and a 2.5 x 8 mm stent to the distal SVG-OM, continue with aspirin Plavix statin beta-blocker and Imdur.   Without angina or dysonea Instructed to take his DAPT therapy and not to miss any doses and missing a dose can result in stent closing up a cardiac arrest. Verbalized understanding.   ~seen by GI during this hospital course for nausea and vomiting, suspected to be elated to gastroparesis in setting of type 1 diabetes,   GI recommended supportive care with antiemetics reglan, PPI, and outpatient gastric emptying study for further evaluation, recommended follow-up with primary GI. He is currently without N/V.   ~Seen by vascular for left foot wound, no plan for vascular and prevention, they recommended to follow-up with vascular upon his return to [place].     Patient is currently without any complaints, reports he already has a follow-up his cardiologist on Tuesday in [place] in which he plans to follow up.   He has been instructed to follow-up with his primary care, vascular, GI, and nephrology. And to seek medical attention if condition changes.   Discharge planning, when to seek medical attention and medications discussed with patient. He Verbalized understanding, he is without any complaints and reports he is ready to go home.      Te FREITAS SHIRLEYEulogio is 59 y.o. male who presents today for a routine follow up after a recent hospitalization

## 2024-12-11 ENCOUNTER — TELEPHONE (OUTPATIENT)
Dept: CARDIOLOGY CLINIC | Age: 59
End: 2024-12-11

## 2024-12-11 NOTE — TELEPHONE ENCOUNTER
Called pt per request of NPTS, she would like claification on his medication. He is supposed to be on crestor 40mg, zetia 10mg once a day. imdur is to be at 60 mg daily.    nifedipine is no longer list, is he taking it? Is he taking hydralazine? Is he taking renvela?

## 2024-12-17 ENCOUNTER — TELEPHONE (OUTPATIENT)
Dept: CARDIOLOGY CLINIC | Age: 59
End: 2024-12-17

## 2024-12-17 NOTE — TELEPHONE ENCOUNTER
Please reach out to patient to see if he can come in 1/10/25 for [facility] office that has just been created. PM preferred.

## 2024-12-26 DIAGNOSIS — N18.4 TYPE 1 DIABETES MELLITUS WITH STAGE 4 CHRONIC KIDNEY DISEASE (HCC): ICD-10-CM

## 2024-12-26 DIAGNOSIS — E10.22 TYPE 1 DIABETES MELLITUS WITH STAGE 4 CHRONIC KIDNEY DISEASE (HCC): ICD-10-CM

## 2024-12-27 RX ORDER — INSULIN ASPART INJECTION 100 [IU]/ML
INJECTION, SOLUTION SUBCUTANEOUS
Qty: 20 ML | Refills: 0 | Status: SHIPPED | OUTPATIENT
Start: 2024-12-27

## 2024-12-30 RX ORDER — NITROGLYCERIN 0.4 MG/1
0.4 TABLET SUBLINGUAL EVERY 5 MIN PRN
Qty: 25 TABLET | Refills: 3 | Status: SHIPPED | OUTPATIENT
Start: 2024-12-30

## 2024-12-30 NOTE — TELEPHONE ENCOUNTER
Medication Refill    Medication needing refilled: Nitroglycerin tablets (patient requesting in pill form)- not found in list     Dosage of the medication:    How are you taking this medication (QD, BID, TID, QID, PRN):    30 or 90 day supply called in:    When will you run out of your medication:    Which Pharmacy are we sending the medication to?:  [id number] [address] - P [phone] - F [phone]

## 2025-01-01 NOTE — PATIENT INSTRUCTIONS
HOSPITALIST DAILY PROGRESS NOTE    Patient: Sobeida Ventura Date: 2025   : 1922 Attending: Dolores Dan MD   89 year old female Current Hospital Day: Hospital Day: 5     Subjective  Patient seen exam this morning, reports feeling a little bit better.  Per RN having small bowel movement with suppositories.  Patient denies any shortness breath or chest pain, abdominal pain.    Objective:      Vitals:   Vital Last Value 24 Hour Range   Temperature 98.1 °F (36.7 °C) (2550) Temp  Min: 98 °F (36.7 °C)  Max: 99.9 °F (37.7 °C)   Pulse 81 (2550) Pulse  Min: 44  Max: 99   Respiratory 18 (25) Resp  Min: 14  Max: 18   Non-Invasive  Blood Pressure 98/55 (2550) BP  Min: 89/45  Max: 100/57   Pulse Oximetry 93 % (25) SpO2  Min: 93 %  Max: 97 %      Physical Exam:  General Appearance: NAD, appears stated age, laying in bed  Head: NC/AT, dry MMM but improved compared to yesterday, good dentition  Eyes: EOMI, conjunctiva/corneas clear  Neck: Supple  Heart: RRR, normal S1 & S2, no murmurs, no JVD  Chest wall: No tenderness  Lungs: CTAB, no crackles, respirations unlabored, on room air  Abdomen: Soft, ND, NT, + BS all quadrants, no masses  Extremities: No edema, cyanosis, or erythema.  Pulses: 2+ and symmetric all extremities  Skin: Warm, no rashes, no lesions  Neurologic: AO x 3, CN 2-12 grossly intact, no gross focal deficits  Psych: Calm, cooperative, mood congruent with affect    Laboratory/Culture/Imaging Results:     Recent Labs   Lab 25  0640 24  0610   WBC 7.7 7.0 3.0*   RBC 2.97* 3.13* 3.44*   HGB 9.6* 10.0* 11.3*   HCT 29.6* 30.3* 33.5*    212 223     Recent Labs   Lab 2540 12/30/24  0610   SODIUM 141 138 131*   POTASSIUM 4.0 4.6 5.3*   CHLORIDE 107 100 94*   CO2 23 24 24   ANIONGAP 15 19 18   BUN 66* 60* 45*   CREATININE 1.69* 1.83* 1.50*   CALCIUM 7.4* 8.0* 8.1*   GLUCOSE 149* 76 116*     Recent Labs   Lab  Continue saline dressing changes every other day   Return in 1 week 12/28/24  0846   BILIRUBIN 0.3   ALBUMIN 2.9*   ALKPT 63   GPT 21   AST 21     Recent Labs   Lab 01/01/25  0619 12/31/24  0640 12/30/24  0610   MG 2.5* 2.5* 2.3   PHOS 3.6  --   --      No results found  No results found  No results found for: \"HGBA1C\"  No results found for: \"PHARTERIAL\"     Microbiology Results       None            Recent Labs   Lab 12/28/24  1134 12/20/24  0917   COL Straw Yellow   UAPP Clear Clear   UGLU Negative Negative   USPG 1.015 1.015   URBC Negative Negative   UPH 6.0 6.0   UPROT Trace* 30*   UNITR Negative Negative   UWBC Negative Moderate*   SEPT  --  None Seen       XR ABDOMEN 2 VIEWS   Final Result      Persistent dilated loops of air-filled bowel in the central abdomen,   similar to the prior. No pneumoperitoneum.            Electronically Signed by: LIAM POLO MD    Signed on: 12/31/2024 8:07 AM    Workstation ID: [id number]      XR NASOGASTRIC TUBE CHECK ABDOMEN   Final Result   Impression:      Nasogastric tube courses below the diaphragm but the tip is not visualized   in the field-of-view. The sideport is within the stomach. There is mild   cardiomegaly.            Electronically Signed by: NOAH ELENA MD    Signed on: 12/30/2024 12:55 AM    Workstation ID: [id number]      XR ABDOMEN 1 VIEW   Final Result   Prominent small bowel loops in the midabdomen. Although the   findings may be due to ileus, the possibility of obstruction is difficult   to exclude. There is no free air.      Electronically Signed by: EDIE HAN MD    Signed on: 12/30/2024 7:19 AM    Workstation ID: [id number]      XR PELVIS 1 VIEW   Final Result      Chronic appearing destructive arthropathy of the left hip appears similar   to the  image from the CT study of June 24, 2024. There is superior   dislocation and remodeling at the left hip joint. Left femoral head is   absent and may be eroded. Moderately severe osteoarthritis of the right   hip. No pelvic fracture detected.          Electronically Signed by: HARPER AVERY MD    Signed on: 12/28/2024 12:50 PM    Workstation ID: [id number]      XR KNEE 3 VIEWS BILATERAL   Final Result      1.   No acute osseous abnormalities.      2.   Severe degenerative changes of the bilateral knees.      3.   Calcific densities in the suprapatellar region, which may be secondary   to synovial chondromatosis.      Electronically Signed by: MILAN RODGERS MD    Signed on: 12/28/2024 8:56 AM    Workstation ID: [id number]      XR ABDOMEN 2 VIEWS    (Results Pending)        I personally reviewed the patient's labs & imaging and agree with the radiology/imaging report(s) above.     Assessment and Plan:    Active Hospital Problems    Diagnosis     Generalized weakness     Chronic pain of both knees     Arthritis of ankle     At risk for falls     Osteoarthritis of hip      Osteoarthritis of right hip; Entered By: Danny Chao MDSigned By: Danny Chao MD      Anemia      Formatting of this note might be different from the original.   Last Assessment & Plan:    Formatting of this note is different from the original.   10.9 hgb   HGB (g/dL)    Date Value    12/13/2021 10.9 (L)       Recheck cbc today   Had elevated mcv but b12 wnl   rechedk b12 and folic acid      Stage 3b chronic kidney disease  (CMD)      Creatinine (mg/dL)  Date Value  05/18/2021 1.39 (H)    GFR Estimate, Non  (no units)  Date Value  08/11/2020 32  Formatting of this note might be different from the original.   Formatting of this note is different from the original.   Creatinine (mg/dL)    Date Value    05/18/2021 1.39 (H)       GFR Estimate, Non  (no units)    Date Value    08/11/2020 32       Last Assessment & Plan:    Formatting of this note is different from the original.   Glomerular Filtration Rate (no units)    Date Value    10/20/2021 36 (L)       Recheck labs today   I'd like to refer over to see nephrology for better blood pressure control.    We can see if removal of lisinopril is warranted or if it is more so protective currently.      Localized, primary osteoarthritis      OA of right knee; Entered By: Alex Huber MDSigned By: Gertrudis Toussaint Reason: RemovedOA of right knee; Entered By: Nazia Sierragned By: Nazia Butcher Reason: Entered In ErrorOA of left knee; Entered By: Harsha Mejia MDSigned By: Harsha Mejia MD; Start Date : 10/28/2020  OA of left knee; Entered By: Harsha YEBOAHigned By: Harsha Mejia MD      Hypertensive disorder      Formatting of this note might be different from the original.   Last Assessment & Plan:    Formatting of this note might be different from the original.   bp elevated today   On atenolol 25 mg, amlodipine 10 mg, lisinopril 10 mg, and hydralazine 50 mg tid.   bp was wnl dec when seen by  Nephrology   Cont current meds         Scheduled meds:   bisacodyl (DULCOLAX) suppository 10 mg Rectal Once    pantoprazole (PROTONIX INJECT) injection 40 mg Intravenous Nightly    [Held by provider] amLODIPine (NORVASC) tablet 10 mg Oral Daily    [Held by provider] atenolol (TENORMIN) tablet 25 mg Oral Daily    hydrALAZINE (APRESOLINE) tablet 50 mg Oral 3 times per day    ipratropium (ATROVENT) 0.06 % nasal spray 2 spray Each Nare BID    [Held by provider] lisinopril (ZESTRIL) tablet 10 mg Oral Daily    atorvastatin (LIPITOR) tablet 10 mg Oral Nightly    sodium chloride 0.9 % injection 2 mL Intracatheter 2 times per day    enoxaparin (LOVENOX) injection 30 mg Subcutaneous Nightly    Potassium Standard Replacement Protocol (Levels 3.5 and lower) Does not apply See Admin Instructions    Magnesium Standard Replacement Protocol Does not apply See Admin Instructions     PRN Meds:   ipratropium-albuterol (DUONEB) 0.5-2.5 (3) MG/3ML nebulizer solution 3 mL  3 mL Nebulization Q4H Resp PRN    HYDROmorphone (DILAUDID) injection 0.2 mg  0.2 mg Intravenous Q6H PRN    HYDROcodone-acetaminophen (NORCO) 5-325 MG per  tablet 1 tablet  1 tablet Oral Q6H PRN    traMADol (ULTRAM) tablet 25 mg  25 mg Oral Q8H PRN    ondansetron (ZOFRAN ODT) disintegrating tablet 4 mg  4 mg Oral Q4H PRN    Or    ondansetron (ZOFRAN) injection 4 mg  4 mg Intravenous Q4H PRN    prochlorperazine (COMPAZINE) injection 5 mg  5 mg Intravenous Q6H PRN    sodium chloride 0.9 % injection 10 mL  10 mL Intravenous PRN    acetaminophen (TYLENOL) tablet 650 mg  650 mg Oral Q4H PRN    Or    acetaminophen (TYLENOL) suppository 650 mg  650 mg Rectal Q4H PRN    polyethylene glycol (MIRALAX) packet 17 g  17 g Oral Daily PRN    docusate sodium-sennosides (SENOKOT S) 50-8.6 MG 2 tablet  2 tablet Oral BID PRN    bisacodyl (DULCOLAX) suppository 10 mg  10 mg Rectal Daily PRN    magnesium hydroxide (MILK OF MAGNESIA) 400 MG/5ML suspension 30 mL  30 mL Oral Daily PRN    melatonin tablet 3 mg  3 mg Oral Nightly PRN    aluminum-magnesium hydroxide-simethicone (MAALOX) 200-200-20 MG/5ML suspension 30 mL  30 mL Oral Q4H PRN    hydrALAZINE (APRESOLINE) injection 10 mg  10 mg Intravenous Q6H PRN    diclofenac (VOLTAREN) 1 % gel 4 g  4 g Topical 4x Daily PRN     89 F Hx HTN, dyslipidemia, CKD 3B, PVD, anemia, neurogenic bladder, colon CA here with weakness, fall, bilateral knee pain, inability to stand.      Small bowel obstruction   Nausea  Vomiting  Abdominal pain   Hx colon cancer   - Likely adhesions from prior abdominal procedures, noted hx of colon cancer.   - NGT placed 12/29 with significant output afterwards, was dislodged 12/30  - Zofran + compazine as needed for nausea.  - Remains NPO at this time   - IV dilaudid as needed for pain while NPO.   - Start IV protonix while NPO  - IVF while NPO  - General surgery remains consulted, appreciate recs  -Personally discussed case with Dr. Ayala today  -Possible small bowel follow-through later today   - Daily suppositories to be continued  - Consult palliative care, appreciate recs     MAMIE on CKD -slightly improving  Hyperkalemia    - Cr uptrended 1.0 -> 1.5 -> 1.83 -> 1.69  - Consult nephrology, appreciate recs   - Avoid nephrotoxins, contrast, NSAIDs  - Urinary retention protocol  - 12/31 Was hypotensive while working with PT, will order small NS bolus  - Daily and BMP     Weakness due to age related debility  Bilateral osteoarthritis of the knees   -Multifactorial, including deconditioning, recent fall, osteoarthritis, anemia, mechanical.    -PT/OT evaluation -> recommending SNF placement   -No significant trauma noted, no fractures.  -Would like to control her knee pain better to facilitate her mobility                 - Continue voltaren gel PRN for the knees                 -Continue Tylenol, tramadol, Norco as needed  -Fall precautions to be continued     HTN complicated by borderline hypotension  Dyslipidemia  PVD  -Amlodipine, atenolol to be held given borderline hypotension  -Hydralazine to be given if SBP greater than 120  -Holding lisinopril for MAMIE   -Continue statin      Anemia of chronic disease:   -Hemoglobin is stable.    -No indication for transfusion at this time  -Daily CBC ongoing     Prophylaxis   - DVT prophylaxis: Lovenox  - PT/OT: Recommending subacute rehab     #Nutrition status: Does Not Meet Criteria for Malnutrition     F/E/N: Diet:   Dietary Orders (From admission, onward)       Start     Ordered    12/31/24 1243  NPO Diet with Exceptions; Medications, Sips of Water; Yes, Medical Nutrition Management by RD (Registered Dietitian)  DIET EFFECTIVE NOW        References:    [facility] Food and Nutrition Services Medical Nutrition Therapy   Question Answer Comment   NPO Modifier with Exceptions    Exceptions Medications    Exceptions Sips of Water    Medical Nutrition Management by RD (Registered Dietitian) Yes, Medical Nutrition Management by RD (Registered Dietitian)        12/31/24 1243                   DVT:   Current Active Medications for DVT Prophylaxis (From admission, onward)           Stop     enoxaparin  (LOVENOX) injection 30 mg  30 mg,   Subcutaneous,   NIGHTLY         --                     Tentative Discharge/Disposition:       Code Status    Code Status: Do Not Resuscitate    I have discussed and educated the patient regarding treatment plan. I have discussed with RN and other consultants at length regarding treatment plan.     Total time spent today on this visit is 56 minutes which includes time spent reviewing data labs and imaging, direct patient care, collaboration with care team.     Dolores Dan MD  [facility] Hospitalist

## 2025-01-10 ENCOUNTER — HOSPITAL ENCOUNTER (INPATIENT)
Age: 60
LOS: 6 days | Discharge: HOME HEALTH | End: 2025-01-16
Attending: EMERGENCY MEDICINE | Admitting: INTERNAL MEDICINE
Payer: MEDICARE

## 2025-01-10 ENCOUNTER — APPOINTMENT (OUTPATIENT)
Dept: GENERAL RADIOLOGY | Age: 60
End: 2025-01-10
Payer: MEDICARE

## 2025-01-10 ENCOUNTER — TELEPHONE (OUTPATIENT)
Dept: CARDIOLOGY CLINIC | Age: 60
End: 2025-01-10

## 2025-01-10 DIAGNOSIS — N28.9 HYPERVOLEMIA ASSOCIATED WITH RENAL INSUFFICIENCY: ICD-10-CM

## 2025-01-10 DIAGNOSIS — R07.9 CHEST PAIN, UNSPECIFIED TYPE: Primary | ICD-10-CM

## 2025-01-10 DIAGNOSIS — N18.6 ESRD (END STAGE RENAL DISEASE) (HCC): ICD-10-CM

## 2025-01-10 DIAGNOSIS — I70.235 ATHEROSCLEROSIS OF NATIVE ARTERIES OF RIGHT LEG WITH ULCERATION OF OTHER PART OF FOOT (HCC): ICD-10-CM

## 2025-01-10 DIAGNOSIS — I96 NECROTIC TOES (HCC): ICD-10-CM

## 2025-01-10 DIAGNOSIS — E87.70 HYPERVOLEMIA ASSOCIATED WITH RENAL INSUFFICIENCY: ICD-10-CM

## 2025-01-10 DIAGNOSIS — I73.9 PAD (PERIPHERAL ARTERY DISEASE) (HCC): ICD-10-CM

## 2025-01-10 PROBLEM — I25.10 CAD (CORONARY ARTERY DISEASE): Status: RESOLVED | Noted: 2024-07-28 | Resolved: 2025-01-10

## 2025-01-10 LAB
ANION GAP SERPL CALCULATED.3IONS-SCNC: 12 MMOL/L (ref 3–16)
BASOPHILS # BLD: 0 K/UL (ref 0–0.2)
BASOPHILS NFR BLD: 0.4 %
BUN SERPL-MCNC: 42 MG/DL (ref 7–20)
CALCIUM SERPL-MCNC: 8.6 MG/DL (ref 8.3–10.6)
CHLORIDE SERPL-SCNC: 97 MMOL/L (ref 99–110)
CO2 SERPL-SCNC: 28 MMOL/L (ref 21–32)
CREAT SERPL-MCNC: 6.4 MG/DL (ref 0.9–1.3)
D-DIMER QUANTITATIVE: 0.68 UG/ML FEU (ref 0–0.6)
DEPRECATED RDW RBC AUTO: 17.2 % (ref 12.4–15.4)
EKG ATRIAL RATE: 106 BPM
EKG DIAGNOSIS: NORMAL
EKG P AXIS: 74 DEGREES
EKG P-R INTERVAL: 130 MS
EKG Q-T INTERVAL: 350 MS
EKG QRS DURATION: 102 MS
EKG QTC CALCULATION (BAZETT): 464 MS
EKG R AXIS: 61 DEGREES
EKG T AXIS: -52 DEGREES
EKG VENTRICULAR RATE: 106 BPM
EOSINOPHIL # BLD: 0 K/UL (ref 0–0.6)
EOSINOPHIL NFR BLD: 0.9 %
FERRITIN SERPL IA-MCNC: 54.3 NG/ML (ref 30–400)
FOLATE SERPL-MCNC: 6.38 NG/ML (ref 4.78–24.2)
GFR SERPLBLD CREATININE-BSD FMLA CKD-EPI: 9 ML/MIN/{1.73_M2}
GLUCOSE BLD-MCNC: 106 MG/DL (ref 70–99)
GLUCOSE BLD-MCNC: 203 MG/DL (ref 70–99)
GLUCOSE SERPL-MCNC: 160 MG/DL (ref 70–99)
HCT VFR BLD AUTO: 30.6 % (ref 40.5–52.5)
HGB BLD-MCNC: 10.1 G/DL (ref 13.5–17.5)
INR PPP: 0.96 (ref 0.85–1.15)
IRON SATN MFR SERPL: 14 % (ref 20–50)
IRON SERPL-MCNC: 34 UG/DL (ref 59–158)
LYMPHOCYTES # BLD: 1 K/UL (ref 1–5.1)
LYMPHOCYTES NFR BLD: 19 %
MAGNESIUM SERPL-MCNC: 1.62 MG/DL (ref 1.8–2.4)
MCH RBC QN AUTO: 28.5 PG (ref 26–34)
MCHC RBC AUTO-ENTMCNC: 33 G/DL (ref 31–36)
MCV RBC AUTO: 86.5 FL (ref 80–100)
MONOCYTES # BLD: 0.9 K/UL (ref 0–1.3)
MONOCYTES NFR BLD: 17.4 %
NEUTROPHILS # BLD: 3.3 K/UL (ref 1.7–7.7)
NEUTROPHILS NFR BLD: 62.3 %
PERFORMED ON: ABNORMAL
PERFORMED ON: ABNORMAL
PHOSPHATE SERPL-MCNC: 2.7 MG/DL (ref 2.5–4.9)
PLATELET # BLD AUTO: 185 K/UL (ref 135–450)
PMV BLD AUTO: 9.1 FL (ref 5–10.5)
POTASSIUM SERPL-SCNC: 4.2 MMOL/L (ref 3.5–5.1)
PROTHROMBIN TIME: 13 SEC (ref 11.9–14.9)
RBC # BLD AUTO: 3.54 M/UL (ref 4.2–5.9)
SODIUM SERPL-SCNC: 137 MMOL/L (ref 136–145)
TIBC SERPL-MCNC: 241 UG/DL (ref 260–445)
TROPONIN, HIGH SENSITIVITY: 138 NG/L (ref 0–22)
TROPONIN, HIGH SENSITIVITY: 142 NG/L (ref 0–22)
VIT B12 SERPL-MCNC: 359 PG/ML (ref 211–911)
WBC # BLD AUTO: 5.3 K/UL (ref 4–11)

## 2025-01-10 PROCEDURE — 84100 ASSAY OF PHOSPHORUS: CPT

## 2025-01-10 PROCEDURE — 80048 BASIC METABOLIC PNL TOTAL CA: CPT

## 2025-01-10 PROCEDURE — 6360000002 HC RX W HCPCS: Performed by: INTERNAL MEDICINE

## 2025-01-10 PROCEDURE — 99223 1ST HOSP IP/OBS HIGH 75: CPT | Performed by: INTERNAL MEDICINE

## 2025-01-10 PROCEDURE — 6370000000 HC RX 637 (ALT 250 FOR IP): Performed by: INTERNAL MEDICINE

## 2025-01-10 PROCEDURE — 85025 COMPLETE CBC W/AUTO DIFF WBC: CPT

## 2025-01-10 PROCEDURE — 2500000003 HC RX 250 WO HCPCS: Performed by: INTERNAL MEDICINE

## 2025-01-10 PROCEDURE — 90945 DIALYSIS ONE EVALUATION: CPT

## 2025-01-10 PROCEDURE — 71046 X-RAY EXAM CHEST 2 VIEWS: CPT

## 2025-01-10 PROCEDURE — 82746 ASSAY OF FOLIC ACID SERUM: CPT

## 2025-01-10 PROCEDURE — 84484 ASSAY OF TROPONIN QUANT: CPT

## 2025-01-10 PROCEDURE — 96374 THER/PROPH/DIAG INJ IV PUSH: CPT

## 2025-01-10 PROCEDURE — 93005 ELECTROCARDIOGRAM TRACING: CPT | Performed by: EMERGENCY MEDICINE

## 2025-01-10 PROCEDURE — 6370000000 HC RX 637 (ALT 250 FOR IP): Performed by: EMERGENCY MEDICINE

## 2025-01-10 PROCEDURE — 83735 ASSAY OF MAGNESIUM: CPT

## 2025-01-10 PROCEDURE — 93010 ELECTROCARDIOGRAM REPORT: CPT | Performed by: INTERNAL MEDICINE

## 2025-01-10 PROCEDURE — 85610 PROTHROMBIN TIME: CPT

## 2025-01-10 PROCEDURE — 82728 ASSAY OF FERRITIN: CPT

## 2025-01-10 PROCEDURE — 82607 VITAMIN B-12: CPT

## 2025-01-10 PROCEDURE — 83540 ASSAY OF IRON: CPT

## 2025-01-10 PROCEDURE — 83550 IRON BINDING TEST: CPT

## 2025-01-10 PROCEDURE — 6360000002 HC RX W HCPCS: Performed by: EMERGENCY MEDICINE

## 2025-01-10 PROCEDURE — 2060000000 HC ICU INTERMEDIATE R&B

## 2025-01-10 PROCEDURE — 3E1M39Z IRRIGATION OF PERITONEAL CAVITY USING DIALYSATE, PERCUTANEOUS APPROACH: ICD-10-PCS | Performed by: INTERNAL MEDICINE

## 2025-01-10 PROCEDURE — 96375 TX/PRO/DX INJ NEW DRUG ADDON: CPT

## 2025-01-10 PROCEDURE — 73660 X-RAY EXAM OF TOE(S): CPT

## 2025-01-10 PROCEDURE — 85379 FIBRIN DEGRADATION QUANT: CPT

## 2025-01-10 PROCEDURE — 99285 EMERGENCY DEPT VISIT HI MDM: CPT

## 2025-01-10 RX ORDER — INSULIN GLARGINE 100 [IU]/ML
15 INJECTION, SOLUTION SUBCUTANEOUS NIGHTLY
Status: DISCONTINUED | OUTPATIENT
Start: 2025-01-10 | End: 2025-01-12

## 2025-01-10 RX ORDER — POLYETHYLENE GLYCOL 3350 17 G/17G
17 POWDER, FOR SOLUTION ORAL DAILY PRN
Status: DISCONTINUED | OUTPATIENT
Start: 2025-01-10 | End: 2025-01-16 | Stop reason: HOSPADM

## 2025-01-10 RX ORDER — CALCITRIOL 0.25 UG/1
0.25 CAPSULE, LIQUID FILLED ORAL DAILY
Status: DISCONTINUED | OUTPATIENT
Start: 2025-01-11 | End: 2025-01-16 | Stop reason: HOSPADM

## 2025-01-10 RX ORDER — HEPARIN SODIUM 5000 [USP'U]/ML
5000 INJECTION, SOLUTION INTRAVENOUS; SUBCUTANEOUS EVERY 8 HOURS SCHEDULED
Status: DISPENSED | OUTPATIENT
Start: 2025-01-10 | End: 2025-01-13

## 2025-01-10 RX ORDER — BUMETANIDE 0.25 MG/ML
2 INJECTION, SOLUTION INTRAMUSCULAR; INTRAVENOUS ONCE
Status: COMPLETED | OUTPATIENT
Start: 2025-01-10 | End: 2025-01-10

## 2025-01-10 RX ORDER — SODIUM CHLORIDE 0.9 % (FLUSH) 0.9 %
5-40 SYRINGE (ML) INJECTION EVERY 12 HOURS SCHEDULED
Status: DISCONTINUED | OUTPATIENT
Start: 2025-01-10 | End: 2025-01-16 | Stop reason: HOSPADM

## 2025-01-10 RX ORDER — LANOLIN ALCOHOL/MO/W.PET/CERES
400 CREAM (GRAM) TOPICAL 2 TIMES DAILY
Status: COMPLETED | OUTPATIENT
Start: 2025-01-10 | End: 2025-01-12

## 2025-01-10 RX ORDER — ONDANSETRON 2 MG/ML
4 INJECTION INTRAMUSCULAR; INTRAVENOUS EVERY 6 HOURS PRN
Status: DISCONTINUED | OUTPATIENT
Start: 2025-01-10 | End: 2025-01-16 | Stop reason: HOSPADM

## 2025-01-10 RX ORDER — INSULIN LISPRO 100 [IU]/ML
4 INJECTION, SOLUTION INTRAVENOUS; SUBCUTANEOUS
Status: DISCONTINUED | OUTPATIENT
Start: 2025-01-10 | End: 2025-01-11

## 2025-01-10 RX ORDER — FUROSEMIDE 40 MG/1
40 TABLET ORAL 2 TIMES DAILY
Status: DISCONTINUED | OUTPATIENT
Start: 2025-01-10 | End: 2025-01-10

## 2025-01-10 RX ORDER — DEXTROSE MONOHYDRATE 100 MG/ML
INJECTION, SOLUTION INTRAVENOUS CONTINUOUS PRN
Status: DISCONTINUED | OUTPATIENT
Start: 2025-01-10 | End: 2025-01-16 | Stop reason: HOSPADM

## 2025-01-10 RX ORDER — INSULIN LISPRO 100 [IU]/ML
0-8 INJECTION, SOLUTION INTRAVENOUS; SUBCUTANEOUS
Status: DISCONTINUED | OUTPATIENT
Start: 2025-01-10 | End: 2025-01-10

## 2025-01-10 RX ORDER — MAGNESIUM SULFATE IN WATER 40 MG/ML
2000 INJECTION, SOLUTION INTRAVENOUS ONCE
Status: DISCONTINUED | OUTPATIENT
Start: 2025-01-10 | End: 2025-01-10

## 2025-01-10 RX ORDER — CLOPIDOGREL BISULFATE 75 MG/1
75 TABLET ORAL DAILY
Status: DISCONTINUED | OUTPATIENT
Start: 2025-01-11 | End: 2025-01-14 | Stop reason: SDUPTHER

## 2025-01-10 RX ORDER — ROSUVASTATIN CALCIUM 10 MG/1
10 TABLET, COATED ORAL NIGHTLY
Status: DISCONTINUED | OUTPATIENT
Start: 2025-01-10 | End: 2025-01-16 | Stop reason: HOSPADM

## 2025-01-10 RX ORDER — SODIUM CHLORIDE 0.9 % (FLUSH) 0.9 %
5-40 SYRINGE (ML) INJECTION PRN
Status: DISCONTINUED | OUTPATIENT
Start: 2025-01-10 | End: 2025-01-16 | Stop reason: HOSPADM

## 2025-01-10 RX ORDER — SEVELAMER CARBONATE 800 MG/1
800 TABLET, FILM COATED ORAL
Status: DISCONTINUED | OUTPATIENT
Start: 2025-01-10 | End: 2025-01-16 | Stop reason: HOSPADM

## 2025-01-10 RX ORDER — LEVETIRACETAM 500 MG/1
500 TABLET ORAL DAILY
Status: DISCONTINUED | OUTPATIENT
Start: 2025-01-10 | End: 2025-01-16 | Stop reason: HOSPADM

## 2025-01-10 RX ORDER — HYDROMORPHONE HYDROCHLORIDE 1 MG/ML
1 INJECTION, SOLUTION INTRAMUSCULAR; INTRAVENOUS; SUBCUTANEOUS ONCE
Status: COMPLETED | OUTPATIENT
Start: 2025-01-10 | End: 2025-01-10

## 2025-01-10 RX ORDER — SODIUM CHLORIDE 9 MG/ML
INJECTION, SOLUTION INTRAVENOUS PRN
Status: DISCONTINUED | OUTPATIENT
Start: 2025-01-10 | End: 2025-01-16 | Stop reason: HOSPADM

## 2025-01-10 RX ORDER — DEXTROSE MONOHYDRATE 100 MG/ML
INJECTION, SOLUTION INTRAVENOUS CONTINUOUS PRN
Status: CANCELLED | OUTPATIENT
Start: 2025-01-10

## 2025-01-10 RX ORDER — SEVELAMER CARBONATE 800 MG/1
1600 TABLET, FILM COATED ORAL
Status: DISCONTINUED | OUTPATIENT
Start: 2025-01-10 | End: 2025-01-10

## 2025-01-10 RX ORDER — ISOSORBIDE MONONITRATE 30 MG/1
30 TABLET, EXTENDED RELEASE ORAL NIGHTLY
Status: DISCONTINUED | OUTPATIENT
Start: 2025-01-10 | End: 2025-01-16 | Stop reason: HOSPADM

## 2025-01-10 RX ORDER — FUROSEMIDE 40 MG/1
80 TABLET ORAL DAILY
Status: DISCONTINUED | OUTPATIENT
Start: 2025-01-10 | End: 2025-01-16 | Stop reason: HOSPADM

## 2025-01-10 RX ORDER — GLUCAGON 1 MG/ML
1 KIT INJECTION PRN
Status: CANCELLED | OUTPATIENT
Start: 2025-01-10

## 2025-01-10 RX ORDER — METOCLOPRAMIDE 10 MG/1
10 TABLET ORAL 4 TIMES DAILY
COMMUNITY

## 2025-01-10 RX ORDER — ASPIRIN 81 MG/1
81 TABLET ORAL DAILY
Status: DISCONTINUED | OUTPATIENT
Start: 2025-01-11 | End: 2025-01-16 | Stop reason: HOSPADM

## 2025-01-10 RX ORDER — ACETAMINOPHEN 325 MG/1
650 TABLET ORAL EVERY 6 HOURS PRN
Status: DISCONTINUED | OUTPATIENT
Start: 2025-01-10 | End: 2025-01-16 | Stop reason: HOSPADM

## 2025-01-10 RX ORDER — ONDANSETRON 4 MG/1
4 TABLET, ORALLY DISINTEGRATING ORAL EVERY 8 HOURS PRN
Status: DISCONTINUED | OUTPATIENT
Start: 2025-01-10 | End: 2025-01-16 | Stop reason: HOSPADM

## 2025-01-10 RX ORDER — HYDROMORPHONE HYDROCHLORIDE 1 MG/ML
0.5 INJECTION, SOLUTION INTRAMUSCULAR; INTRAVENOUS; SUBCUTANEOUS EVERY 4 HOURS PRN
Status: DISCONTINUED | OUTPATIENT
Start: 2025-01-10 | End: 2025-01-13

## 2025-01-10 RX ORDER — RANOLAZINE 500 MG/1
500 TABLET, EXTENDED RELEASE ORAL 2 TIMES DAILY
Status: DISCONTINUED | OUTPATIENT
Start: 2025-01-10 | End: 2025-01-16 | Stop reason: HOSPADM

## 2025-01-10 RX ORDER — GENTAMICIN SULFATE 1 MG/G
CREAM TOPICAL DAILY
Status: DISCONTINUED | OUTPATIENT
Start: 2025-01-10 | End: 2025-01-13

## 2025-01-10 RX ORDER — CARVEDILOL 6.25 MG/1
12.5 TABLET ORAL 2 TIMES DAILY WITH MEALS
Status: DISCONTINUED | OUTPATIENT
Start: 2025-01-10 | End: 2025-01-16 | Stop reason: HOSPADM

## 2025-01-10 RX ORDER — LANOLIN ALCOHOL/MO/W.PET/CERES
400 CREAM (GRAM) TOPICAL ONCE
Status: COMPLETED | OUTPATIENT
Start: 2025-01-10 | End: 2025-01-10

## 2025-01-10 RX ORDER — ACETAMINOPHEN 650 MG/1
650 SUPPOSITORY RECTAL EVERY 6 HOURS PRN
Status: DISCONTINUED | OUTPATIENT
Start: 2025-01-10 | End: 2025-01-16 | Stop reason: HOSPADM

## 2025-01-10 RX ORDER — FENTANYL CITRATE 50 UG/ML
50 INJECTION, SOLUTION INTRAMUSCULAR; INTRAVENOUS ONCE
Status: DISCONTINUED | OUTPATIENT
Start: 2025-01-10 | End: 2025-01-10

## 2025-01-10 RX ORDER — GLUCAGON 1 MG/ML
1 KIT INJECTION PRN
Status: DISCONTINUED | OUTPATIENT
Start: 2025-01-10 | End: 2025-01-16 | Stop reason: HOSPADM

## 2025-01-10 RX ADMIN — CARVEDILOL 12.5 MG: 6.25 TABLET, FILM COATED ORAL at 21:19

## 2025-01-10 RX ADMIN — RANOLAZINE 500 MG: 500 TABLET, EXTENDED RELEASE ORAL at 21:19

## 2025-01-10 RX ADMIN — HYDROMORPHONE HYDROCHLORIDE 0.5 MG: 1 INJECTION, SOLUTION INTRAMUSCULAR; INTRAVENOUS; SUBCUTANEOUS at 17:45

## 2025-01-10 RX ADMIN — Medication 400 MG: at 21:40

## 2025-01-10 RX ADMIN — SEVELAMER CARBONATE 800 MG: 800 TABLET, FILM COATED ORAL at 18:08

## 2025-01-10 RX ADMIN — HEPARIN SODIUM 5000 UNITS: 5000 INJECTION INTRAVENOUS; SUBCUTANEOUS at 18:08

## 2025-01-10 RX ADMIN — ROSUVASTATIN CALCIUM 10 MG: 10 TABLET, FILM COATED ORAL at 21:20

## 2025-01-10 RX ADMIN — Medication 10 ML: at 21:23

## 2025-01-10 RX ADMIN — HEPARIN SODIUM 5000 UNITS: 5000 INJECTION INTRAVENOUS; SUBCUTANEOUS at 21:19

## 2025-01-10 RX ADMIN — ISOSORBIDE MONONITRATE 30 MG: 30 TABLET, EXTENDED RELEASE ORAL at 21:20

## 2025-01-10 RX ADMIN — BUMETANIDE 2 MG: 0.25 INJECTION INTRAMUSCULAR; INTRAVENOUS at 12:32

## 2025-01-10 RX ADMIN — FUROSEMIDE 80 MG: 40 TABLET ORAL at 18:08

## 2025-01-10 RX ADMIN — HYDROMORPHONE HYDROCHLORIDE 1 MG: 1 INJECTION, SOLUTION INTRAMUSCULAR; INTRAVENOUS; SUBCUTANEOUS at 10:58

## 2025-01-10 RX ADMIN — Medication 400 MG: at 10:31

## 2025-01-10 RX ADMIN — ONDANSETRON HYDROCHLORIDE 4 MG: 2 SOLUTION INTRAMUSCULAR; INTRAVENOUS at 22:15

## 2025-01-10 RX ADMIN — HYDROMORPHONE HYDROCHLORIDE 0.5 MG: 1 INJECTION, SOLUTION INTRAMUSCULAR; INTRAVENOUS; SUBCUTANEOUS at 21:39

## 2025-01-10 RX ADMIN — SERTRALINE HYDROCHLORIDE 50 MG: 50 TABLET ORAL at 21:19

## 2025-01-10 RX ADMIN — SACUBITRIL AND VALSARTAN 1 TABLET: 49; 51 TABLET, FILM COATED ORAL at 21:19

## 2025-01-10 RX ADMIN — NITROGLYCERIN 0.5 INCH: 20 OINTMENT TOPICAL at 10:33

## 2025-01-10 RX ADMIN — INSULIN LISPRO 4 UNITS: 100 INJECTION, SOLUTION INTRAVENOUS; SUBCUTANEOUS at 18:07

## 2025-01-10 ASSESSMENT — PAIN SCALES - GENERAL
PAINLEVEL_OUTOF10: 3
PAINLEVEL_OUTOF10: 8
PAINLEVEL_OUTOF10: 3
PAINLEVEL_OUTOF10: 1
PAINLEVEL_OUTOF10: 5
PAINLEVEL_OUTOF10: 3
PAINLEVEL_OUTOF10: 7
PAINLEVEL_OUTOF10: 3

## 2025-01-10 ASSESSMENT — PAIN DESCRIPTION - ORIENTATION: ORIENTATION: MID;LEFT

## 2025-01-10 ASSESSMENT — PAIN DESCRIPTION - LOCATION
LOCATION: CHEST

## 2025-01-10 ASSESSMENT — PAIN DESCRIPTION - DESCRIPTORS: DESCRIPTORS: STABBING

## 2025-01-10 NOTE — ED NOTES
Pt and family sts that he responds better to Dilaudid and asked that the fentanyl be changed. Dr. Gomes notified and agreeable, verbal order for 1mg of Dilaudid.

## 2025-01-10 NOTE — TELEPHONE ENCOUNTER
Wife states pt had an appt today at 830, but had to go to ER because things got bad. Any questions please call Wife Sachi [phone]

## 2025-01-10 NOTE — CONSULTS
Nephrology Consult Note  [phone]  [phone]   [facility]           Reason for Consult:  ESRD    HISTORY OF PRESENT ILLNESS:              The patient is a 59 y.o.male with significant past medical history of ESRD on PD, CAD/CABG/CHF, carotid artery stenosis, DM 2, diabetic neuropathy, hypertension, hyperlipidemia, seizure disorder came in with complaints of chest pain and shortness of breath, also reports lower extremity edema  Chest x-ray with no acute process  He also had a x-ray of the right foot that showed soft tissue swelling and ulceration of the fifth digit with no radiographic evidence of osteomyelitis  Of note, cardiology was consulted for elevated troponins, no plans for any intervention at this time    Recently treated for PD related peritonitis, cultures were positive for Staph epidermidis, treated with vancomycin, completed vancomycin on 10/25/2024.  Readmitted with peritonitis again in November 2024, cultures again positive for staph epidermidis, discharged on intraperitoneal vancomycin    We are consulted for peritoneal dialysis management  Patient seen and examined in the ER  States that despite the swelling in the legs he has been using low concentration dextrose fluids  No fevers or chills  Denies abdominal pain or nausea or emesis    Past Medical History:        Diagnosis Date    Angina at rest ([facility])     Cardiomyopathy ([facility])     Carotid artery stenosis 10/25/2016    SUZANNA stented with 8 x 30 mm non-tapered Xact stent    Charcot foot due to diabetes mellitus ([facility]) 07/01/2024    CHF (congestive heart failure) ([facility]) 03/03/2015    EF 30%     CKD (chronic kidney disease) stage 2, GFR 60-89 ml/min     Coronary artery disease     sp CABG UC    Coronary artery disease involving native heart with angina pectoris ([facility]) 05/26/2010    Formatting of this note might be different from the original.   s/p CABG      Last Assessment & Plan:    Formatting of this note might be different from the  Ex-Partner: No     Emotionally Abused: No     Physically Abused: No     Sexually Abused: No   Housing Stability: Unknown (11/29/2024)    Received from [facility]    Housing Stability Vital Sign     Unable to Pay for Housing in the Last Year: No     Number of Times Moved in the Last Year: Not on file     Homeless in the Last Year: No       Family History:       Problem Relation Age of Onset    Hypertension Mother     Seizures Mother         epilepsy    Coronary Art Dis Father     Diabetes Father     Heart Murmur Sister     Thyroid Disease Sister     Mult Sclerosis Brother     Arthritis Brother     Sleep Apnea Brother     Coronary Art Dis Paternal Grandmother     Diabetes Paternal Grandmother            Review of Systems   Constitutional:  Positive for fatigue. Negative for chills and fever.   Respiratory:  Positive for shortness of breath. Negative for cough and wheezing.    Cardiovascular:  Positive for chest pain and leg swelling. Negative for palpitations.   Gastrointestinal:  Negative for abdominal pain, constipation, diarrhea, nausea and vomiting.   Genitourinary:  Negative for difficulty urinating and hematuria.   Musculoskeletal:  Negative for arthralgias, back pain and myalgias.   Skin:  Negative for pallor and rash.   Neurological:  Negative for dizziness, seizures and headaches.   Psychiatric/Behavioral:  Negative for confusion and hallucinations.        PHYSICAL EXAM:      Vitals:    BP (!) 150/80   Pulse 84   Temp 99.3 °F (37.4 °C) (Oral)   Resp 12   Ht 1.727 m (5' 8\")   Wt 86.2 kg (190 lb)   SpO2 100%   BMI 28.89 kg/m²   No intake/output data recorded.  No intake/output data recorded.    Physical Exam:  General : AAOx3, not in pain or respiratory distress, resting in bed  HEENT : normocephalic, atraumatic, mucosa moist, no palor or icterus  CVS: S1 S2 normal, regular rhythm, no murmurs or rubs.  Lungs: Clear, no wheezing or crackles.  Abd: Soft, bowel sounds normal, non-tender. PD

## 2025-01-10 NOTE — ED NOTES
Patient Name: Te Garay  : 1965 59 y.o.  MRN: [id number]  ED Room #: ED-0027/27     Chief complaint:   Chief Complaint   Patient presents with    Chest Pain     Pt to ED c/o midsternal chest pain that radiates to left side x3 days. Pt reports pain became constant last night. PT reports increase in ankle swelling. Pt reports SOB. Pt with extensive cardiac history s/p cardiac stents 2024. Pt gets perineoanal dialysis nightly. Pt sts he has not missed dialysis.      Shortness of Breath    Leg Swelling     Hospital Problem/Diagnosis:   Hospital Problems             Last Modified POA    * (Principal) Chest pain 1/10/2025 Yes         O2 Flow Rate:    (if applicable)  Cardiac Rhythm:   (if applicable)  Active LDA's:           How does patient ambulate? Low Fall Risk (Ambulates by themselves without support    2. How does patient take pills? Whole with Water    3. Is patient alert? Alert    4. Is patient oriented? To Person, To Place, To Time, To Situation, and Follows Commands    5.   Patient arrived from:  home  Facility Name: ___________________________________________    6. If patient is disoriented or from a Skill Nursing Facility has family been notified of admission? No    7. Patient belongings? Belongings: Cell Phone, Wallet, Dentures, and Clothing    Disposition of belongings? Kept with Patient     8. Any specific patient or family belongings/needs/dynamics?   a.     9. Miscellaneous comments/pending orders?  a. All ED orders complete      If there are any additional questions please reach out to the Emergency Department.

## 2025-01-10 NOTE — H&P
V2.0  History and Physical      Name:  Te Garay /Age/Sex: 1965  (59 y.o. male)   MRN & CSN:  [id number] & [id number] Encounter Date/Time: 1/10/2025 1:24 PM EST   Location:  [facility]3369/3369-01 PCP: Reid Lei MD       Hospital Day: 1    Assessment and Plan:   Te Garay is a 59 y.o. male with a pmh of ESRD on PD, PD related peritonitis x 2 (October and 2024), CAD s/p CABG and multiple PCI's, chronic HFrEF, HTN, seizures, CVA involving the TAMICA, PVD, T1DM on insulin pump, DM neuropathy presented from home with complaints of chest pain, lower extremity edema and shortness of breath.      Hospital Problems             Last Modified POA    * (Principal) Chest pain 1/10/2025 Yes       Plan:    Chest pain with chronically elevated troponin:  CAD s/p CABG and multiple PCI:  Recent LHC with SVG-OM1 stent 2024   TTE 10/25/2024: LVEF 40-45 %.  Cardiology consult appreciated: Trop stable, not c/w ACS.    Continue Plavix, ASA, Ranexa, Coreg, Crestor, Imdur.    Peritonitis associated with PD:  2 different episodes in October and 2024   Blood cultures positive for Staph epidermidis.    Completed antibiotic treatment with vancomycin.      HFrEF without acute decompensation: Continue Entresto, Lasix and PD.    History of CVA: Continue aspirin/Plavix and statin.     Insulin dependent diabetes mellitus with neuropathy:   A1c 8.6 % on 10/14/2024.  Follow-up repeat A1c.  Insulin pump not available at this time.  Started on basal/prandial insulin with SSI.     ESRD on PD with volume overload:   Nephrology consulted. Continue CCPD.  Continue Renvela.    Seizure disorder: Continue AEDs.  Seizure precautions.    Hypomagnesemia: Replaced.  Monitor electrolytes and replace as needed.    Right fifth toe diabetic Ulcer/Charcot arthropathy:  Follows with Dr. Lundy out patient.  Patient had been referred to Dr. Perez outpatient.  Will consult vascular.  Follow-up BLE arterial duplex    Disposition:      Organism:   Lab Results   Component Value Date/Time    ORG Staphylococcus epidermidis 11/10/2024 10:47 PM       Imaging/Diagnostics Last 24 Hours   XR TOE RIGHT (MIN 2 VIEWS)    Result Date: 1/10/2025  EXAMINATION: 3 XRAY VIEWS OF THE RIGHT TOE 1/10/2025 9:28 am COMPARISON: None. HISTORY: ORDERING SYSTEM PROVIDED HISTORY: Necrotic appearance o 5thf toe worsening x weeks TECHNOLOGIST PROVIDED HISTORY: Reason for exam:->Necrotic appearance o 5thf toe worsening x weeks Specify which digit to image->Fifth FINDINGS: No acute fracture or dislocation.  Soft tissue swelling and suspected ulceration at the lateral aspect of the 5th digit.  No subcutaneous gas.  No erosions are identified of the phalanges.     Soft tissue swelling and ulceration of the 5th digit with no radiographic evidence of osteomyelitis.     XR CHEST (2 VW)    Result Date: 1/10/2025  EXAMINATION: TWO XRAY VIEWS OF THE CHEST 1/10/2025 9:27 am COMPARISON: 10/11/2024 HISTORY: ORDERING SYSTEM PROVIDED HISTORY: Substernal CP rad to LUE onset this AM TECHNOLOGIST PROVIDED HISTORY: Reason for exam:->Substernal CP rad to LUE onset this AM FINDINGS: The lungs are without acute focal process.  There is no effusion or pneumothorax. The cardiomediastinal silhouette is stable. The osseous structures are stable.     No acute process.         Electronically signed by Jayla Rubio MD on 1/10/2025 at 4:24 PM
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
## 2025-01-10 NOTE — ED PROVIDER NOTES
Name: Te Garay : 1965 MRN: [id number] Date of Service: 1/10/2025    Initial VS: BP: (!) 155/81, Temp: 99.3 °F (37.4 °C), Pulse: (!) 107, Respirations: 18, SpO2: 100 %     CC: chest pain    HPI: this patient is a 59 y.o. male presenting to the ED from home. Mr. Garay tells me that early this morning he began to experience intense, heaviness-like pain to the center of his chest. Since onset this pain has been coming and going frequently and seemingly at random without appreciable inciting or alleviating factors. Occasionally this pain will radiate into his left shoulder. Along with this pain he has been feeling modestly short of breath throughout the day. He adds that for at least the last several days he has appreciated increasing swelling to both of his lower legs. Furthermore, his significant other notes that she is worried about the appearance of one of his right toes. Mr. Garay has not appreciated other changes from his usual state of health and he denies other current complaints. Of note, he just underwent coronary angiography with stent placement at an [facility] hospital on 2024. He states that he has already taken doses of aspirin and clopidogrel this AM.  _____________________________________________________________________    Past Medical History:   Diagnosis Date    Cardiomyopathy (HCC)     Carotid artery stenosis 10/25/2016    Coronary artery disease     De Quervain thyroiditis 2023    ESRD (end stage renal disease) ([facility])     HFrEF (heart failure with reduced ejection fraction) ([facility])     Hyperlipidemia 2010    Hyperparathyroidism (secondary) 2024    Hypertension 2019    Overweight     Peripheral neuropathy     PVD (peripheral vascular disease) ([facility])     Seizure disorder ([facility])     TIA (transient ischemic attack)     Type 1 diabetes mellitus       Past Surgical History:   Procedure Laterality Date    ANKLE SURGERY Left 2024    REPAIR OF BREVIS TENDON-LEFT  Coronary Art Dis Paternal Grandmother     Diabetes Paternal Grandmother      Social History     Tobacco Use    Smoking status: Never    Smokeless tobacco: Never   Vaping Use    Vaping status: Never Used   Substance Use Topics    Alcohol use: Yes     Alcohol/week: 0.0 - 1.0 standard drinks of alcohol    Drug use: No     _____________________________________________________________________    Review of Systems   Constitutional:  Negative for chills, fatigue and fever.   HENT:  Negative for hearing loss.    Eyes:  Negative for visual disturbance.   Respiratory:  Positive for shortness of breath. Negative for cough.    Cardiovascular:  Positive for chest pain and leg swelling.   Gastrointestinal:  Negative for abdominal pain, nausea and vomiting.   Genitourinary:  Negative for flank pain.   Musculoskeletal:  Negative for back pain and gait problem.   Skin:  Positive for wound (toe). Negative for rash.   Neurological:  Negative for weakness, numbness and headaches.       Physical Exam  Constitutional:       General: He is awake. He is not in acute distress.     Appearance: He is ill-appearing (chronically). He is not toxic-appearing or diaphoretic.   HENT:      Head: Normocephalic and atraumatic.   Eyes:      Conjunctiva/sclera: Conjunctivae normal.   Neck:      Vascular: No JVD.   Cardiovascular:      Rate and Rhythm: Regular rhythm. Tachycardia present.      Pulses:           Radial pulses are 2+ on the right side and 2+ on the left side.        Dorsalis pedis pulses are 1+ on the right side.      Heart sounds: Normal heart sounds. No murmur heard.  Pulmonary:      Effort: Pulmonary effort is normal. No respiratory distress.      Breath sounds: Rales (faint) present.   Abdominal:      General: There is no distension.      Palpations: Abdomen is soft.      Tenderness: There is no abdominal tenderness. There is no guarding or rebound.   Musculoskeletal:      Right lower le+ Edema present.      Left lower le+

## 2025-01-11 ENCOUNTER — APPOINTMENT (OUTPATIENT)
Dept: VASCULAR LAB | Age: 60
End: 2025-01-11
Attending: INTERNAL MEDICINE
Payer: MEDICARE

## 2025-01-11 PROBLEM — N28.9 HYPERVOLEMIA ASSOCIATED WITH RENAL INSUFFICIENCY: Status: ACTIVE | Noted: 2025-01-11

## 2025-01-11 PROBLEM — E87.70 HYPERVOLEMIA ASSOCIATED WITH RENAL INSUFFICIENCY: Status: ACTIVE | Noted: 2025-01-11

## 2025-01-11 LAB
ALBUMIN SERPL-MCNC: 3.1 G/DL (ref 3.4–5)
ALBUMIN/GLOB SERPL: 1.1 {RATIO} (ref 1.1–2.2)
ALP SERPL-CCNC: 107 U/L (ref 40–129)
ALT SERPL-CCNC: 9 U/L (ref 10–40)
ANION GAP SERPL CALCULATED.3IONS-SCNC: 15 MMOL/L (ref 3–16)
APPEARANCE FLUID: CLEAR
AST SERPL-CCNC: 17 U/L (ref 15–37)
BASOPHILS # BLD: 0 K/UL (ref 0–0.2)
BASOPHILS NFR BLD: 0.3 %
BDY FLUID QUALITY: NORMAL
BILIRUB SERPL-MCNC: <0.2 MG/DL (ref 0–1)
BUN SERPL-MCNC: 37 MG/DL (ref 7–20)
CALCIUM SERPL-MCNC: 8.5 MG/DL (ref 8.3–10.6)
CELL COUNT FLUID TYPE: NORMAL
CHLORIDE SERPL-SCNC: 96 MMOL/L (ref 99–110)
CO2 SERPL-SCNC: 24 MMOL/L (ref 21–32)
COLOR FLUID: COLORLESS
CREAT SERPL-MCNC: 5.8 MG/DL (ref 0.9–1.3)
DEPRECATED RDW RBC AUTO: 18.2 % (ref 12.4–15.4)
EOSINOPHIL # BLD: 0 K/UL (ref 0–0.6)
EOSINOPHIL NFR BLD: 1.6 %
GFR SERPLBLD CREATININE-BSD FMLA CKD-EPI: 11 ML/MIN/{1.73_M2}
GLUCOSE BLD-MCNC: 184 MG/DL (ref 70–99)
GLUCOSE BLD-MCNC: 319 MG/DL (ref 70–99)
GLUCOSE BLD-MCNC: 390 MG/DL (ref 70–99)
GLUCOSE BLD-MCNC: 560 MG/DL (ref 70–99)
GLUCOSE BLD-MCNC: 90 MG/DL (ref 70–99)
GLUCOSE SERPL-MCNC: 514 MG/DL (ref 70–99)
HCT VFR BLD AUTO: 34.8 % (ref 40.5–52.5)
HGB BLD-MCNC: 11.1 G/DL (ref 13.5–17.5)
LYMPHOCYTES # BLD: 0.9 K/UL (ref 1–5.1)
LYMPHOCYTES NFR BLD: 29.6 %
LYMPHOCYTES NFR FLD: 4 %
MAGNESIUM SERPL-MCNC: 1.73 MG/DL (ref 1.8–2.4)
MCH RBC QN AUTO: 29 PG (ref 26–34)
MCHC RBC AUTO-ENTMCNC: 31.9 G/DL (ref 31–36)
MCV RBC AUTO: 90.7 FL (ref 80–100)
MONOCYTES # BLD: 0.7 K/UL (ref 0–1.3)
MONOCYTES NFR BLD: 24 %
MONOCYTES NFR FLD: 82 %
NEUTROPHIL, FLUID: 14 %
NEUTROPHILS # BLD: 1.3 K/UL (ref 1.7–7.7)
NEUTROPHILS NFR BLD: 44.5 %
NUC CELL # FLD: 20 /CUMM
PERFORMED ON: ABNORMAL
PERFORMED ON: NORMAL
PHOSPHATE SERPL-MCNC: 3.3 MG/DL (ref 2.5–4.9)
PLATELET # BLD AUTO: 166 K/UL (ref 135–450)
PMV BLD AUTO: 9.3 FL (ref 5–10.5)
POTASSIUM SERPL-SCNC: 4.4 MMOL/L (ref 3.5–5.1)
PROT SERPL-MCNC: 5.9 G/DL (ref 6.4–8.2)
RBC # BLD AUTO: 3.84 M/UL (ref 4.2–5.9)
RBC FLUID: <1000 /CUMM
SODIUM SERPL-SCNC: 135 MMOL/L (ref 136–145)
TOTAL CELLS COUNTED FLD: 50
TROPONIN, HIGH SENSITIVITY: 125 NG/L (ref 0–22)
WBC # BLD AUTO: 2.9 K/UL (ref 4–11)

## 2025-01-11 PROCEDURE — 80053 COMPREHEN METABOLIC PANEL: CPT

## 2025-01-11 PROCEDURE — 89051 BODY FLUID CELL COUNT: CPT

## 2025-01-11 PROCEDURE — 93925 LOWER EXTREMITY STUDY: CPT

## 2025-01-11 PROCEDURE — 2500000003 HC RX 250 WO HCPCS: Performed by: INTERNAL MEDICINE

## 2025-01-11 PROCEDURE — 2060000000 HC ICU INTERMEDIATE R&B

## 2025-01-11 PROCEDURE — 6360000002 HC RX W HCPCS: Performed by: NURSE PRACTITIONER

## 2025-01-11 PROCEDURE — 84484 ASSAY OF TROPONIN QUANT: CPT

## 2025-01-11 PROCEDURE — 6360000002 HC RX W HCPCS: Performed by: INTERNAL MEDICINE

## 2025-01-11 PROCEDURE — 1200000000 HC SEMI PRIVATE

## 2025-01-11 PROCEDURE — 2580000003 HC RX 258: Performed by: NURSE PRACTITIONER

## 2025-01-11 PROCEDURE — 99232 SBSQ HOSP IP/OBS MODERATE 35: CPT | Performed by: NURSE PRACTITIONER

## 2025-01-11 PROCEDURE — 6370000000 HC RX 637 (ALT 250 FOR IP): Performed by: STUDENT IN AN ORGANIZED HEALTH CARE EDUCATION/TRAINING PROGRAM

## 2025-01-11 PROCEDURE — 83735 ASSAY OF MAGNESIUM: CPT

## 2025-01-11 PROCEDURE — 36415 COLL VENOUS BLD VENIPUNCTURE: CPT

## 2025-01-11 PROCEDURE — 85025 COMPLETE CBC W/AUTO DIFF WBC: CPT

## 2025-01-11 PROCEDURE — 6370000000 HC RX 637 (ALT 250 FOR IP): Performed by: INTERNAL MEDICINE

## 2025-01-11 PROCEDURE — 84100 ASSAY OF PHOSPHORUS: CPT

## 2025-01-11 PROCEDURE — 99222 1ST HOSP IP/OBS MODERATE 55: CPT | Performed by: SURGERY

## 2025-01-11 PROCEDURE — 90945 DIALYSIS ONE EVALUATION: CPT

## 2025-01-11 RX ORDER — INSULIN LISPRO 100 [IU]/ML
0-16 INJECTION, SOLUTION INTRAVENOUS; SUBCUTANEOUS
Status: DISCONTINUED | OUTPATIENT
Start: 2025-01-11 | End: 2025-01-16 | Stop reason: HOSPADM

## 2025-01-11 RX ADMIN — CALCITRIOL CAPSULES 0.25 MCG 0.25 MCG: 0.25 CAPSULE ORAL at 08:40

## 2025-01-11 RX ADMIN — ROSUVASTATIN CALCIUM 10 MG: 10 TABLET, FILM COATED ORAL at 21:32

## 2025-01-11 RX ADMIN — INSULIN HUMAN 10 UNITS: 100 INJECTION, SOLUTION PARENTERAL at 08:37

## 2025-01-11 RX ADMIN — SEVELAMER CARBONATE 800 MG: 800 TABLET, FILM COATED ORAL at 08:40

## 2025-01-11 RX ADMIN — RANOLAZINE 500 MG: 500 TABLET, EXTENDED RELEASE ORAL at 08:39

## 2025-01-11 RX ADMIN — INSULIN GLARGINE 15 UNITS: 100 INJECTION, SOLUTION SUBCUTANEOUS at 21:36

## 2025-01-11 RX ADMIN — Medication 10 ML: at 21:37

## 2025-01-11 RX ADMIN — CLOPIDOGREL BISULFATE 75 MG: 75 TABLET ORAL at 08:40

## 2025-01-11 RX ADMIN — SEVELAMER CARBONATE 800 MG: 800 TABLET, FILM COATED ORAL at 12:15

## 2025-01-11 RX ADMIN — GENTAMICIN SULFATE: 1 CREAM TOPICAL at 08:40

## 2025-01-11 RX ADMIN — LEVETIRACETAM 500 MG: 500 TABLET, FILM COATED ORAL at 08:40

## 2025-01-11 RX ADMIN — FUROSEMIDE 80 MG: 40 TABLET ORAL at 08:40

## 2025-01-11 RX ADMIN — HEPARIN SODIUM 5000 UNITS: 5000 INJECTION INTRAVENOUS; SUBCUTANEOUS at 21:33

## 2025-01-11 RX ADMIN — CARVEDILOL 12.5 MG: 6.25 TABLET, FILM COATED ORAL at 08:39

## 2025-01-11 RX ADMIN — GENTAMICIN SULFATE: 1 CREAM TOPICAL at 21:15

## 2025-01-11 RX ADMIN — INSULIN LISPRO 4 UNITS: 100 INJECTION, SOLUTION INTRAVENOUS; SUBCUTANEOUS at 21:35

## 2025-01-11 RX ADMIN — ASPIRIN 81 MG: 81 TABLET, COATED ORAL at 08:40

## 2025-01-11 RX ADMIN — SODIUM CHLORIDE 200 MG: 9 INJECTION, SOLUTION INTRAVENOUS at 14:26

## 2025-01-11 RX ADMIN — SACUBITRIL AND VALSARTAN 1 TABLET: 49; 51 TABLET, FILM COATED ORAL at 08:40

## 2025-01-11 RX ADMIN — ISOSORBIDE MONONITRATE 30 MG: 30 TABLET, EXTENDED RELEASE ORAL at 21:33

## 2025-01-11 RX ADMIN — SERTRALINE HYDROCHLORIDE 50 MG: 50 TABLET ORAL at 21:32

## 2025-01-11 RX ADMIN — SEVELAMER CARBONATE 800 MG: 800 TABLET, FILM COATED ORAL at 17:25

## 2025-01-11 RX ADMIN — INSULIN GLARGINE 15 UNITS: 100 INJECTION, SOLUTION SUBCUTANEOUS at 01:14

## 2025-01-11 RX ADMIN — HEPARIN SODIUM 5000 UNITS: 5000 INJECTION INTRAVENOUS; SUBCUTANEOUS at 05:58

## 2025-01-11 RX ADMIN — Medication 400 MG: at 21:32

## 2025-01-11 RX ADMIN — RANOLAZINE 500 MG: 500 TABLET, EXTENDED RELEASE ORAL at 21:33

## 2025-01-11 RX ADMIN — INSULIN LISPRO 16 UNITS: 100 INJECTION, SOLUTION INTRAVENOUS; SUBCUTANEOUS at 12:15

## 2025-01-11 RX ADMIN — HYDROMORPHONE HYDROCHLORIDE 0.5 MG: 1 INJECTION, SOLUTION INTRAMUSCULAR; INTRAVENOUS; SUBCUTANEOUS at 08:36

## 2025-01-11 RX ADMIN — SACUBITRIL AND VALSARTAN 1 TABLET: 49; 51 TABLET, FILM COATED ORAL at 21:48

## 2025-01-11 RX ADMIN — HEPARIN SODIUM 5000 UNITS: 5000 INJECTION INTRAVENOUS; SUBCUTANEOUS at 12:15

## 2025-01-11 RX ADMIN — ONDANSETRON HYDROCHLORIDE 4 MG: 2 SOLUTION INTRAMUSCULAR; INTRAVENOUS at 10:22

## 2025-01-11 RX ADMIN — CARVEDILOL 12.5 MG: 6.25 TABLET, FILM COATED ORAL at 17:25

## 2025-01-11 RX ADMIN — Medication 400 MG: at 08:40

## 2025-01-11 ASSESSMENT — PAIN SCALES - GENERAL
PAINLEVEL_OUTOF10: 3
PAINLEVEL_OUTOF10: 1
PAINLEVEL_OUTOF10: 7

## 2025-01-11 ASSESSMENT — PAIN DESCRIPTION - LOCATION
LOCATION: CHEST
LOCATION: CHEST

## 2025-01-11 ASSESSMENT — PAIN DESCRIPTION - ORIENTATION: ORIENTATION: MID;LEFT

## 2025-01-11 ASSESSMENT — PAIN SCALES - WONG BAKER: WONGBAKER_NUMERICALRESPONSE: NO HURT

## 2025-01-11 ASSESSMENT — PAIN DESCRIPTION - DESCRIPTORS: DESCRIPTORS: STABBING

## 2025-01-11 NOTE — CARE COORDINATION
Received a call from Cindy that patient is active with [facility] for RN, PT and OT services.    Electronically signed by ROBIN Miller, ALEX on 1/11/2025 at 9:05 AM

## 2025-01-11 NOTE — CONSULTS
Vascular Surgery Consultation    Te Garay  [id number]  1/11/2025 1965    Date of Admission:  1/10/2025  8:02 AM  Date of Consultation:  1/11/2025    PCP:  Reid Lei MD       Chief Complaint: Right 5th toe ulcer    History of Present Illness:   We are asked to see this patient in consultation by Dr. Lowe.  Te Garay is a 59 y.o. male who was admitted for chest discomfort.  He has a history of cardiomyopathy, end-stage renal disease on dialysis, hypertension, hyperlipidemia and diabetes.  He has peripheral diabetic neuropathy.  He recently required left fifth metatarsal excision in December.  Healing nicely from this.  On admission this time, noted dry gangrene of the right fifth toe.  Denies any associated pain or drainage.  He had a duplex evaluation, notable for right tibial artery disease.    I personally viewed images of the following studies:  Vascular US Duplex Lower Extremity Arteries Bilateral 1/11/2025       2D ECHO 11/29/2024      Past Medical History:  Past Medical History:   Diagnosis Date    Cardiomyopathy ([facility])     Carotid artery stenosis 10/25/2016    Coronary artery disease     De Quervain thyroiditis 06/08/2023    ESRD (end stage renal disease) ([facility])     HFrEF (heart failure with reduced ejection fraction) ([facility])     Hyperlipidemia 05/26/2010    Hyperparathyroidism (secondary) 04/25/2024    Hypertension 01/12/2019    Overweight     Peripheral neuropathy     Poorly controlled type 1 diabetes mellitus (HCC)     PVD (peripheral vascular disease) ([facility])     Seizure disorder ([facility])     TIA (transient ischemic attack) 2024       Past Surgical History:  Past Surgical History:   Procedure Laterality Date    ANKLE SURGERY Left 11/25/2024    REPAIR OF BREVIS TENDON-LEFT FOOT; COMPLEX WOUND CLOSURE-LEFT FOOT; APPLICATION BELOW KNEE SPLINT-LEFT LOWER LIMB performed by David Lowe DPM at [facility] OR    BLADDER SURGERY Left 08/24/2023    CYSTOSCOPY, LEFT URETEROSCOPY, STONE BASKET

## 2025-01-11 NOTE — PROGRESS NOTES
Pt blood glucose @ 2145 was 109 and pt refused insulin as per pt the BG was low, At 0114 BG was rechecked and it was 311 insulin Lantus 15 unit was administered.

## 2025-01-11 NOTE — ACP (ADVANCE CARE PLANNING)
Advanced Care Planning Note    Purpose of Encounter: Advanced care planning in light of chest pain  Parties In Attendance: Patient, Jeyson Fernandez MD  Decisional Capacity: Yes  Subjective: Patient had chest pain  Objective: Cr 5.4  Goals of Care Determination: Patient wants full support  Plan:  Nephro recs  Code Status: Full   Time spent on Advanced care Plannin minutes  Advanced Care Planning Documents: Completed advanced directives on chart, Sachi, is the Healthcare POA.    Jeyson Fernandez MD  2025 11:00 AM

## 2025-01-11 NOTE — DIALYSIS
Treatment time: 14 Hours 30 minutes  Net UF: 1317 ml  Dwell Time: 42 minutes Gained    PD specimens walked to lab. Treatment completed without complications or complaints from patient. Effluent clear and lines taped to patient per protocol. Patient resting comfortably with VSS upon exiting room.      Copy of dialysis treatment record placed in chart, to be scanned into EMR and report given to Katie Lira RN.

## 2025-01-11 NOTE — CONSULTS
Department of Podiatry Consult Note  Resident       Reason for Consult: Right fifth toe gangrene  Requesting Physician:  Dr. Jeyson Fernandez MD    CHIEF COMPLAINT: Black right fifth toe    HISTORY OF PRESENT ILLNESS:                The patient is a 59 y.o. male with significant past medical history as seen below who is consulted for right fifth toe gangrene.  Patient is well-known to our podiatric service requiring both inpatient and outpatient care with a most recent inpatient care including a resection of the left fifth metatarsal with repair of deep peroneal brevis tendon and complex skin closure of the left lower extremity.  Patient has been following with  closely in the outpatient setting and notes routine wound healing with near resolution on the left lower extremity.  Recently within the past few weeks patient noticed a small wound on his right fifth toe that has been getting larger over the past couple weeks with necrotic changes to the tip of his right fifth toe.  Patient states that if he tries to move the toe it does become painful however he does not note pain at baseline.  Patient has been keeping the wound clean and dry daily with help from his wife. Patient denies any other pedal complaints at this time. Denies F/C/N/V.      Past Medical History:        Diagnosis Date    Cardiomyopathy (HCC)     Carotid artery stenosis 10/25/2016    Coronary artery disease     De Quervain thyroiditis 06/08/2023    ESRD (end stage renal disease) ([facility])     HFrEF (heart failure with reduced ejection fraction) ([facility])     Hyperlipidemia 05/26/2010    Hyperparathyroidism (secondary) 04/25/2024    Hypertension 01/12/2019    Overweight     Peripheral neuropathy     Poorly controlled type 1 diabetes mellitus (HCC)     PVD (peripheral vascular disease) ([facility])     Seizure disorder ([facility])     TIA (transient ischemic attack) 2024       Past Surgical History:        Procedure Laterality Date    ANKLE SURGERY Left 11/25/2024

## 2025-01-11 NOTE — PROGRESS NOTES
CCPD Order   Exchanges: 5    Exchange Volume: 2500 ml   Total Time: 12 hrs   Dextrose: 2.5%   Last Fill: 0 ml   Total Volume: 41188 ml     Orders verified. Supplies taken to pt's room. Report received. Cycler set up, primed and pre tested. Dressing changed on [facility] Cath site. Pt connected to cycler. CCPD initiated without problem. Initial effluent clear.       If problems should arise please call the [phone] number on top of PD cycler machine.

## 2025-01-11 NOTE — PROGRESS NOTES
Admit Date: 1/10/2025  Diet: ADULT DIET; Regular; 4 carb choices (60 gm/meal); No Caffeine    CC: Chest pain    Interval history:   Overnight, there were no acute events. Patient's vitals remained stable    Patient was seen this morning. I discussed the plan of the day with him in detail. All questions were addressed and answered to patient's satisfaction.   Patient denies fevers, chills, nausea, vomiting, chest pain, shortness of breath, diarrhea, constipation, dysuria, urinary frequency or urgency.     Plan:     -PD fluid analysis pending  -Control BG with goal b/w 140-180  -LE vascular duplex pending  -Will wait for any further recs from nephrology and vascular sx    Assessment:   Te Garay is a 59 y.o. male with PMH of ESRD on PD, PD related peritonitis x 2 (October and November 2024), CAD s/p CABG and multiple PCI's, chronic HFrEF, HTN, seizures, CVA involving the TAMICA, PVD, T1DM on insulin pump, DM neuropathy  who was admitted with chest pain    Chest pain with chronically elevated troponin:  CAD s/p CABG and multiple PCI:  Recent LHC with SVG-OM1 stent 12/2024   TTE 10/25/2024: LVEF 40-45 %.  Cardiology consult appreciated: Trop stable, not c/w ACS.    Continue Plavix, ASA, Ranexa, Coreg, Crestor, Imdur.     Peritonitis associated with PD:  2 different episodes in October and November 2024   Blood cultures positive for Staph epidermidis.    Completed antibiotic treatment with vancomycin.       HFrEF without acute decompensation: Continue Entresto, Lasix and PD.     History of CVA: Continue aspirin/Plavix and statin.     Insulin dependent diabetes mellitus with neuropathy:   A1c 8.6 % on 10/14/2024.  Follow-up repeat A1c.  Insulin pump not available at this time.  Started on basal/prandial insulin with SSI.     ESRD on PD with volume overload:   Nephrology consulted. Continue CCPD.  Continue Renvela.     Seizure disorder: Continue AEDs.  Seizure precautions.     Hypomagnesemia: Replaced.  Monitor  electrolytes and replace as needed.     Right fifth toe diabetic Ulcer/Charcot arthropathy:  Follows with Dr. Lundy out patient.  Patient had been referred to Dr. Perez outpatient.  Will consult vascular.  Follow-up BLE arterial duplex    Code Status: Full Code   FEN: ADULT DIET; Regular; 4 carb choices (60 gm/meal); No Caffeine   DVT PPX: []Lovenox, [x]Heparin, []Coumadin, []Eliquis, []Xarelto, []SCD  DISPO Pending: Nephro mgmt of fluid overload    Jeyson Fernandez MD  1/11/2025,  10:56 AM    This note was likely completed using voice recognition technology and may contain unintended errors.     Objective:   Vitals:   T-max:  Patient Vitals for the past 8 hrs:   BP Temp Temp src Pulse Resp SpO2 Weight   01/11/25 0835 (!) 164/75 98.1 °F (36.7 °C) Oral 80 18 100 % --   01/11/25 0813 -- -- -- -- -- -- 85.5 kg (188 lb 9.6 oz)   01/11/25 0430 133/66 97.8 °F (36.6 °C) Axillary 81 16 99 % --       Intake/Output Summary (Last 24 hours) at 1/11/2025 1056  Last data filed at 1/11/2025 0858  Gross per 24 hour   Intake --   Output 1350 ml   Net -1350 ml       Physical Exam  General: NAD  Eyes: EOMI  ENT: neck supple  Cardiovascular: Regular rate.  Respiratory: Clear to auscultation  Gastrointestinal: Soft, nontender, nondistended PD catheter site with no drainage. Dressing in place.  Genitourinary: no suprapubic tenderness  Musculoskeletal: No edema  Skin: warm, dry.  Right fifth toe necrosis.  Neuro: Alert and oriented x 4.  No focal deficits.  Psych: Mood appropriate.  Diminished pedal pulses    Review of Systems  - Negative except Interval History    LABS:    CBC:   Recent Labs     01/10/25  0818 01/11/25  0535   WBC 5.3 2.9*   HGB 10.1* 11.1*   HCT 30.6* 34.8*    166   MCV 86.5 90.7     Renal:    Recent Labs     01/10/25  0818 01/11/25  0535    135*   K 4.2 4.4   CL 97* 96*   CO2 28 24   BUN 42* 37*   CREATININE 6.4* 5.8*   GLUCOSE 160* 514*   CALCIUM 8.6 8.5   MG 1.62* 1.73*   PHOS 2.7 3.3   ANIONGAP 12 15

## 2025-01-11 NOTE — PROGRESS NOTES
[facility]  DAILY PROGRESS NOTE    Admit Date: 1/10/2025       Chief Complaint: chest pain     Interval History:   Patient seen and examined and notes reviewed. Patient is being followed for CAD. Today, he c/o continued chest pain but thinks it is related to his elevated BG.  He denies SOB, palpitations or dizziness. He still feels like he is volume overloaded.     In: -   Out: 750    Wt Readings from Last 2 Encounters:   01/10/25 86.2 kg (190 lb 0.6 oz)   12/10/24 90.3 kg (199 lb)         Data:   Scheduled Meds:   Scheduled Meds:   aspirin  81 mg Oral Daily    calcitRIOL  0.25 mcg Oral Daily    carvedilol  12.5 mg Oral BID WC    clopidogrel  75 mg Oral Daily    isosorbide mononitrate  30 mg Oral Nightly    levETIRAcetam  500 mg Oral Daily    ranolazine  500 mg Oral BID    rosuvastatin  10 mg Oral Nightly    sacubitril-valsartan  1 tablet Oral BID    sertraline  50 mg Oral Nightly    sodium chloride flush  5-40 mL IntraVENous 2 times per day    heparin (porcine)  5,000 Units SubCUTAneous 3 times per day    magnesium oxide  400 mg Oral BID    insulin glargine  15 Units SubCUTAneous Nightly    insulin lispro  4 Units SubCUTAneous TID WC    furosemide  80 mg Oral Daily    sevelamer  800 mg Oral TID WC    gentamicin   Topical Daily     Continuous Infusions:   dextrose      sodium chloride       PRN Meds:.HYDROmorphone, dextrose bolus **OR** dextrose bolus, glucagon (rDNA), dextrose, sodium chloride flush, sodium chloride, ondansetron **OR** ondansetron, acetaminophen **OR** acetaminophen, polyethylene glycol  Continuous Infusions:   dextrose      sodium chloride         Intake/Output Summary (Last 24 hours) at 1/11/2025 0818  Last data filed at 1/11/2025 0735  Gross per 24 hour   Intake --   Output 750 ml   Net -750 ml     Lab Data:  CBC:   Lab Results   Component Value Date/Time    WBC 2.9 01/11/2025 05:35 AM    HGB 11.1 01/11/2025 05:35 AM     01/11/2025 05:35 AM     BMP:  Lab Results   Component  Non-obese/Well Nourished  Respiratory:  Resp Auscultation: Normal breath sounds without dullness  Cardiovascular:  Auscultation: Regular rate and rhythm, normal S1S2, no m/g/r/c  Palpation: Normal    Pedal Pulses: 2+ and equal   Abdomen:  Soft, NT, ND, + bs  Extremities:  No Cyanosis or Clubbing  Extremities: Trace left, and 1+ edema right LE  Neurological/Psychiatric:  Oriented to time, place, and person  Non-anxious    Patient Active Problem List:     HLD (hyperlipidemia)     PVD (peripheral vascular disease) (HCC)     Chronic systolic heart failure (HCC)     ESRD on peritoneal dialysis (HCC)     Septicemia (HCC)     Generalized abdominal pain     Staphylococcus epidermidis infection     Seizure disorder (HCC)     Pancytopenia (HCC)     HTN (hypertension), benign     Type 1 diabetes mellitus (HCC)     Cardiomyopathy (HCC)     Coronary artery disease of autologous vein bypass graft with stable angina pectoris ([facility])     Anemia due to stage 5 chronic kidney disease, not on chronic dialysis (HCC)     Chest pain     Peritonitis associated with peritoneal dialysis, initial encounter ([facility])     Cholelithiasis without cholecystitis     Overweight (BMI 25.0-29.9)     Diabetic nephropathy associated with type 2 diabetes mellitus ([facility])     S/P CABG (coronary artery bypass graft)     Diarrhea     Diabetes education, encounter for     Peritonitis (HCC)     Secondary hyperparathyroidism ([facility])        Assessment/Plan:     CAD/chest pain:   ~Status: trops stable, not c/w ACS at baseline level   ~Meds:   Statin crestor  BB: Carvedilol  ASA  Nitrates, ranexa  ~Plan: optimize volume status, re-evaluate sx when euvolemic  2. HTN:   ~controlled  ~Plan: continue current meds  3. Congestive Heart Failure:  ~volume up 2/2 ESRD, missed PD  4. Cardiomyopathy: ICM  ~Echo:  EF: 40%   Core measures for HF:  ACEi/ARB/ARNI: entresto      BB: Carvedilol    Luke:  none for ESRD         SGLT2i:  none for ESRD      ~Device: none

## 2025-01-11 NOTE — PROGRESS NOTES
Nephrology Progress Note  [facility]  [phone]  [email or web address]        Subjective:   Te Garay: 59 y.o.  male who we are seeing in consultation for  ESRD Management on peritoneal dialysis     Brief HPI:  Te Garay is a 59 y.o. male with PMH significant for  ESRD on PD, CAD/CABG/CHF, carotid artery stenosis, DM 2, diabetic neuropathy, hypertension, hyperlipidemia, seizure disorder came in with complaints of chest pain and shortness of breath, also reports lower extremity edema.   Chest x-ray with no acute process  He also had a x-ray of the right foot that showed soft tissue swelling and ulceration of the fifth digit with no radiographic evidence of osteomyelitis  Of note, cardiology was consulted for elevated troponins, no plans for any intervention at this time     Recently treated for PD related peritonitis, cultures were positive for Staph epidermidis, treated with vancomycin, completed vancomycin on 10/25/2024.  Readmitted with peritonitis again in 2024, cultures again positive for staph epidermidis, discharged on intraperitoneal vancomycin     We are consulted for peritoneal dialysis management    Interval History / Subjective:  - Events overnight reviewed.  - Seen and examined.    - BP in fair range    - Breathing comfortably, sating 95% on room air.   - Reports leg swelling improved.   - no issues with PD .       Objective:   VITALS:    Vitals:    25 1702   BP: (!) 157/77   Pulse: 68   Resp: 16   Temp: 98 °F (36.7 °C)   SpO2: 99%                                                                                    Temp (24hrs), Av.8 °F (36.6 °C), Min:97.6 °F (36.4 °C), Max:98.1 °F (36.7 °C)     BP  Min: 130/73  Max: 167/74                                  Pulse  Av.6  Min: 66  Max: 92    24HR INTAKE/OUTPUT:    Intake/Output Summary (Last 24 hours) at 2025 1821  Last data filed at 2025 0858  Gross per 24 hour   Intake --   Output 1350 ml

## 2025-01-12 LAB
ALBUMIN SERPL-MCNC: 3.2 G/DL (ref 3.4–5)
ALBUMIN/GLOB SERPL: 1.1 {RATIO} (ref 1.1–2.2)
ALP SERPL-CCNC: 108 U/L (ref 40–129)
ALT SERPL-CCNC: 9 U/L (ref 10–40)
ANION GAP SERPL CALCULATED.3IONS-SCNC: 10 MMOL/L (ref 3–16)
AST SERPL-CCNC: 16 U/L (ref 15–37)
BASOPHILS # BLD: 0 K/UL (ref 0–0.2)
BASOPHILS NFR BLD: 0.5 %
BILIRUB SERPL-MCNC: <0.2 MG/DL (ref 0–1)
BUN SERPL-MCNC: 34 MG/DL (ref 7–20)
CALCIUM SERPL-MCNC: 9 MG/DL (ref 8.3–10.6)
CHLORIDE SERPL-SCNC: 97 MMOL/L (ref 99–110)
CO2 SERPL-SCNC: 29 MMOL/L (ref 21–32)
CREAT SERPL-MCNC: 5.5 MG/DL (ref 0.9–1.3)
DEPRECATED RDW RBC AUTO: 17.6 % (ref 12.4–15.4)
EOSINOPHIL # BLD: 0.1 K/UL (ref 0–0.6)
EOSINOPHIL NFR BLD: 2.1 %
GFR SERPLBLD CREATININE-BSD FMLA CKD-EPI: 11 ML/MIN/{1.73_M2}
GLUCOSE BLD-MCNC: 105 MG/DL (ref 70–99)
GLUCOSE BLD-MCNC: 203 MG/DL (ref 70–99)
GLUCOSE BLD-MCNC: 232 MG/DL (ref 70–99)
GLUCOSE BLD-MCNC: 292 MG/DL (ref 70–99)
GLUCOSE SERPL-MCNC: 253 MG/DL (ref 70–99)
HCT VFR BLD AUTO: 34.7 % (ref 40.5–52.5)
HGB BLD-MCNC: 11.4 G/DL (ref 13.5–17.5)
LYMPHOCYTES # BLD: 1.1 K/UL (ref 1–5.1)
LYMPHOCYTES NFR BLD: 32.5 %
MAGNESIUM SERPL-MCNC: 1.84 MG/DL (ref 1.8–2.4)
MCH RBC QN AUTO: 28.6 PG (ref 26–34)
MCHC RBC AUTO-ENTMCNC: 32.9 G/DL (ref 31–36)
MCV RBC AUTO: 87 FL (ref 80–100)
MONOCYTES # BLD: 0.7 K/UL (ref 0–1.3)
MONOCYTES NFR BLD: 19.6 %
NEUTROPHILS # BLD: 1.5 K/UL (ref 1.7–7.7)
NEUTROPHILS NFR BLD: 45.3 %
PERFORMED ON: ABNORMAL
PLATELET # BLD AUTO: 193 K/UL (ref 135–450)
PMV BLD AUTO: 9.3 FL (ref 5–10.5)
POTASSIUM SERPL-SCNC: 3.8 MMOL/L (ref 3.5–5.1)
PROT SERPL-MCNC: 6 G/DL (ref 6.4–8.2)
RBC # BLD AUTO: 3.99 M/UL (ref 4.2–5.9)
SODIUM SERPL-SCNC: 136 MMOL/L (ref 136–145)
WBC # BLD AUTO: 3.4 K/UL (ref 4–11)

## 2025-01-12 PROCEDURE — 36415 COLL VENOUS BLD VENIPUNCTURE: CPT

## 2025-01-12 PROCEDURE — 99232 SBSQ HOSP IP/OBS MODERATE 35: CPT | Performed by: NURSE PRACTITIONER

## 2025-01-12 PROCEDURE — 6370000000 HC RX 637 (ALT 250 FOR IP): Performed by: STUDENT IN AN ORGANIZED HEALTH CARE EDUCATION/TRAINING PROGRAM

## 2025-01-12 PROCEDURE — 85025 COMPLETE CBC W/AUTO DIFF WBC: CPT

## 2025-01-12 PROCEDURE — 2500000003 HC RX 250 WO HCPCS: Performed by: INTERNAL MEDICINE

## 2025-01-12 PROCEDURE — 6360000002 HC RX W HCPCS: Performed by: INTERNAL MEDICINE

## 2025-01-12 PROCEDURE — 6370000000 HC RX 637 (ALT 250 FOR IP): Performed by: INTERNAL MEDICINE

## 2025-01-12 PROCEDURE — 1200000000 HC SEMI PRIVATE

## 2025-01-12 PROCEDURE — 2060000000 HC ICU INTERMEDIATE R&B

## 2025-01-12 PROCEDURE — 80053 COMPREHEN METABOLIC PANEL: CPT

## 2025-01-12 PROCEDURE — 83735 ASSAY OF MAGNESIUM: CPT

## 2025-01-12 PROCEDURE — 2580000003 HC RX 258: Performed by: NURSE PRACTITIONER

## 2025-01-12 PROCEDURE — 6360000002 HC RX W HCPCS: Performed by: NURSE PRACTITIONER

## 2025-01-12 PROCEDURE — 90945 DIALYSIS ONE EVALUATION: CPT

## 2025-01-12 RX ORDER — INSULIN LISPRO 100 [IU]/ML
3 INJECTION, SOLUTION INTRAVENOUS; SUBCUTANEOUS
Status: DISCONTINUED | OUTPATIENT
Start: 2025-01-12 | End: 2025-01-14

## 2025-01-12 RX ORDER — INSULIN GLARGINE 100 [IU]/ML
18 INJECTION, SOLUTION SUBCUTANEOUS NIGHTLY
Status: DISCONTINUED | OUTPATIENT
Start: 2025-01-12 | End: 2025-01-16 | Stop reason: HOSPADM

## 2025-01-12 RX ADMIN — CLOPIDOGREL BISULFATE 75 MG: 75 TABLET ORAL at 09:40

## 2025-01-12 RX ADMIN — HEPARIN SODIUM 5000 UNITS: 5000 INJECTION INTRAVENOUS; SUBCUTANEOUS at 16:17

## 2025-01-12 RX ADMIN — INSULIN LISPRO 4 UNITS: 100 INJECTION, SOLUTION INTRAVENOUS; SUBCUTANEOUS at 12:53

## 2025-01-12 RX ADMIN — LEVETIRACETAM 500 MG: 500 TABLET, FILM COATED ORAL at 09:40

## 2025-01-12 RX ADMIN — SEVELAMER CARBONATE 800 MG: 800 TABLET, FILM COATED ORAL at 09:40

## 2025-01-12 RX ADMIN — SEVELAMER CARBONATE 800 MG: 800 TABLET, FILM COATED ORAL at 16:17

## 2025-01-12 RX ADMIN — SACUBITRIL AND VALSARTAN 1 TABLET: 49; 51 TABLET, FILM COATED ORAL at 09:40

## 2025-01-12 RX ADMIN — INSULIN LISPRO 4 UNITS: 100 INJECTION, SOLUTION INTRAVENOUS; SUBCUTANEOUS at 21:06

## 2025-01-12 RX ADMIN — INSULIN LISPRO 8 UNITS: 100 INJECTION, SOLUTION INTRAVENOUS; SUBCUTANEOUS at 09:39

## 2025-01-12 RX ADMIN — SEVELAMER CARBONATE 800 MG: 800 TABLET, FILM COATED ORAL at 12:52

## 2025-01-12 RX ADMIN — FUROSEMIDE 80 MG: 40 TABLET ORAL at 09:40

## 2025-01-12 RX ADMIN — SODIUM CHLORIDE 200 MG: 9 INJECTION, SOLUTION INTRAVENOUS at 16:35

## 2025-01-12 RX ADMIN — INSULIN LISPRO 3 UNITS: 100 INJECTION, SOLUTION INTRAVENOUS; SUBCUTANEOUS at 12:53

## 2025-01-12 RX ADMIN — Medication 400 MG: at 09:40

## 2025-01-12 RX ADMIN — INSULIN GLARGINE 18 UNITS: 100 INJECTION, SOLUTION SUBCUTANEOUS at 21:06

## 2025-01-12 RX ADMIN — HEPARIN SODIUM 5000 UNITS: 5000 INJECTION INTRAVENOUS; SUBCUTANEOUS at 05:56

## 2025-01-12 RX ADMIN — ISOSORBIDE MONONITRATE 30 MG: 30 TABLET, EXTENDED RELEASE ORAL at 21:07

## 2025-01-12 RX ADMIN — RANOLAZINE 500 MG: 500 TABLET, EXTENDED RELEASE ORAL at 09:40

## 2025-01-12 RX ADMIN — ASPIRIN 81 MG: 81 TABLET, COATED ORAL at 09:40

## 2025-01-12 RX ADMIN — Medication 10 ML: at 09:40

## 2025-01-12 RX ADMIN — ROSUVASTATIN CALCIUM 10 MG: 10 TABLET, FILM COATED ORAL at 21:08

## 2025-01-12 RX ADMIN — RANOLAZINE 500 MG: 500 TABLET, EXTENDED RELEASE ORAL at 21:08

## 2025-01-12 RX ADMIN — CARVEDILOL 12.5 MG: 6.25 TABLET, FILM COATED ORAL at 16:20

## 2025-01-12 RX ADMIN — HEPARIN SODIUM 5000 UNITS: 5000 INJECTION INTRAVENOUS; SUBCUTANEOUS at 21:06

## 2025-01-12 RX ADMIN — CALCITRIOL CAPSULES 0.25 MCG 0.25 MCG: 0.25 CAPSULE ORAL at 09:40

## 2025-01-12 RX ADMIN — GENTAMICIN SULFATE: 1 CREAM TOPICAL at 12:52

## 2025-01-12 RX ADMIN — SERTRALINE HYDROCHLORIDE 50 MG: 50 TABLET ORAL at 21:08

## 2025-01-12 RX ADMIN — SACUBITRIL AND VALSARTAN 1 TABLET: 49; 51 TABLET, FILM COATED ORAL at 21:08

## 2025-01-12 RX ADMIN — CARVEDILOL 12.5 MG: 6.25 TABLET, FILM COATED ORAL at 09:40

## 2025-01-12 NOTE — PLAN OF CARE
Problem: Chronic Conditions and Co-morbidities  Goal: Patient's chronic conditions and co-morbidity symptoms are monitored and maintained or improved  1/12/2025 1136 by Nya Johnson RN  Outcome: Progressing     Problem: Discharge Planning  Goal: Discharge to home or other facility with appropriate resources  1/12/2025 1136 by Nya Johnson RN  Outcome: Progressing     Problem: Pain  Goal: Verbalizes/displays adequate comfort level or baseline comfort level  1/12/2025 1136 by Nya Johnson RN  Outcome: Progressing     Problem: Safety - Adult  Goal: Free from fall injury  1/12/2025 1136 by Nya Johnson, RN  Outcome: Progressing

## 2025-01-12 NOTE — PROGRESS NOTES
Admit Date: 1/10/2025  Diet: ADULT DIET; Regular; 4 carb choices (60 gm/meal); No Caffeine    CC: Chest pain    Interval history:   Overnight, there were no acute events. Patient's vitals remained stable    Patient was seen this morning. I discussed the plan of the day with him in detail. All questions were addressed and answered to patient's satisfaction.   Patient denies fevers, chills, nausea, vomiting, chest pain, shortness of breath, diarrhea, constipation, dysuria, urinary frequency or urgency.     Plan:     -Control BG with goal b/w 140-180  -LE vascular duplex pending  -Will wait for any further recs from nephrology and vascular sx    Assessment:   Te Garay is a 59 y.o. male with PMH of ESRD on PD, PD related peritonitis x 2 (October and November 2024), CAD s/p CABG and multiple PCI's, chronic HFrEF, HTN, seizures, CVA involving the TAMICA, PVD, T1DM on insulin pump, DM neuropathy  who was admitted with chest pain    Chest pain with chronically elevated troponin:  CAD s/p CABG and multiple PCI:  Recent LHC with SVG-OM1 stent 12/2024   TTE 10/25/2024: LVEF 40-45 %.  Cardiology consult appreciated: Trop stable, not c/w ACS.    Continue Plavix, ASA, Ranexa, Coreg, Crestor, Imdur.     Peritonitis associated with PD:  2 different episodes in October and November 2024   Blood cultures positive for Staph epidermidis.    Completed antibiotic treatment with vancomycin.       HFrEF without acute decompensation: Continue Entresto, Lasix and PD.     History of CVA: Continue aspirin/Plavix and statin.     Insulin dependent diabetes mellitus with neuropathy:   A1c 8.6 % on 10/14/2024.  Follow-up repeat A1c.  Insulin pump not available at this time.  Started on basal/prandial insulin with SSI.     ESRD on PD with volume overload:   Nephrology consulted. Continue CCPD.  Continue Renvela.     Seizure disorder: Continue AEDs.  Seizure precautions.     Hypomagnesemia: Replaced.  Monitor electrolytes and replace as  ondansetron, acetaminophen **OR** acetaminophen, polyethylene glycol

## 2025-01-12 NOTE — PROGRESS NOTES
Treatment time: 14 Hours 21 minutes  Net UF: 164 ml  Dwell Time: 43 minutes gained    Treatment completed without complications or complaints from patient. Effluent clear and lines taped to patient per protocol. Patient resting comfortably with VSS upon exiting room.      Copy of dialysis treatment record placed in chart, to be scanned into EMR and report given to Nya Johnson RN.

## 2025-01-12 NOTE — PROGRESS NOTES
[facility]  DAILY PROGRESS NOTE    Admit Date: 1/10/2025       Chief Complaint: chest pain     Interval History:   Patient seen and examined and notes reviewed. Patient is being followed for CAD. Today He denies CP, SOB, palpitations or dizziness.     In: -   Out: 900    Wt Readings from Last 2 Encounters:   01/11/25 85.5 kg (188 lb 9.6 oz)   12/10/24 90.3 kg (199 lb)         Data:   Scheduled Meds:   Scheduled Meds:   insulin lispro  0-16 Units SubCUTAneous 4x Daily AC & HS    iron sucrose  200 mg IntraVENous Q24H    aspirin  81 mg Oral Daily    calcitRIOL  0.25 mcg Oral Daily    carvedilol  12.5 mg Oral BID WC    clopidogrel  75 mg Oral Daily    isosorbide mononitrate  30 mg Oral Nightly    levETIRAcetam  500 mg Oral Daily    ranolazine  500 mg Oral BID    rosuvastatin  10 mg Oral Nightly    sacubitril-valsartan  1 tablet Oral BID    sertraline  50 mg Oral Nightly    sodium chloride flush  5-40 mL IntraVENous 2 times per day    heparin (porcine)  5,000 Units SubCUTAneous 3 times per day    magnesium oxide  400 mg Oral BID    insulin glargine  15 Units SubCUTAneous Nightly    furosemide  80 mg Oral Daily    sevelamer  800 mg Oral TID WC    gentamicin   Topical Daily     Continuous Infusions:   dextrose      sodium chloride       PRN Meds:.HYDROmorphone, dextrose bolus **OR** dextrose bolus, glucagon (rDNA), dextrose, sodium chloride flush, sodium chloride, ondansetron **OR** ondansetron, acetaminophen **OR** acetaminophen, polyethylene glycol  Continuous Infusions:   dextrose      sodium chloride         Intake/Output Summary (Last 24 hours) at 1/12/2025 0840  Last data filed at 1/11/2025 0858  Gross per 24 hour   Intake --   Output 600 ml   Net -600 ml     Lab Data:  CBC:   Lab Results   Component Value Date/Time    WBC 3.4 01/12/2025 06:41 AM    HGB 11.4 01/12/2025 06:41 AM     01/12/2025 06:41 AM     BMP:  Lab Results   Component Value Date/Time     01/12/2025 06:41 AM    K 3.8 01/12/2025  CATP, AGPCNP  Heart Failure  [facility]

## 2025-01-12 NOTE — DIALYSIS
Treatment initiated without complications or complaints from patient. Patient resting comfortably with VSS upon dialysis RN exit from room. Lady Mark RN notified of start of treatment and hotline number located on top of machine for any questions about troubleshooting during after hours.

## 2025-01-13 PROBLEM — I70.235 ATHEROSCLEROSIS OF NATIVE ARTERIES OF RIGHT LEG WITH ULCERATION OF OTHER PART OF FOOT (HCC): Status: ACTIVE | Noted: 2025-01-10

## 2025-01-13 LAB
ALBUMIN SERPL-MCNC: 3.1 G/DL (ref 3.4–5)
ALBUMIN/GLOB SERPL: 1.1 {RATIO} (ref 1.1–2.2)
ALP SERPL-CCNC: 104 U/L (ref 40–129)
ALT SERPL-CCNC: 8 U/L (ref 10–40)
ANION GAP SERPL CALCULATED.3IONS-SCNC: 10 MMOL/L (ref 3–16)
AST SERPL-CCNC: 17 U/L (ref 15–37)
BASOPHILS # BLD: 0 K/UL (ref 0–0.2)
BASOPHILS NFR BLD: 0.5 %
BILIRUB SERPL-MCNC: <0.2 MG/DL (ref 0–1)
BUN SERPL-MCNC: 34 MG/DL (ref 7–20)
CALCIUM SERPL-MCNC: 8.8 MG/DL (ref 8.3–10.6)
CHLORIDE SERPL-SCNC: 100 MMOL/L (ref 99–110)
CO2 SERPL-SCNC: 28 MMOL/L (ref 21–32)
CREAT SERPL-MCNC: 5.9 MG/DL (ref 0.9–1.3)
DEPRECATED RDW RBC AUTO: 17.5 % (ref 12.4–15.4)
ECHO BSA: 2.03 M2
EOSINOPHIL # BLD: 0.1 K/UL (ref 0–0.6)
EOSINOPHIL NFR BLD: 3.2 %
GFR SERPLBLD CREATININE-BSD FMLA CKD-EPI: 10 ML/MIN/{1.73_M2}
GLUCOSE BLD-MCNC: 145 MG/DL (ref 70–99)
GLUCOSE BLD-MCNC: 165 MG/DL (ref 70–99)
GLUCOSE BLD-MCNC: 185 MG/DL (ref 70–99)
GLUCOSE BLD-MCNC: 261 MG/DL (ref 70–99)
GLUCOSE BLD-MCNC: 323 MG/DL (ref 70–99)
GLUCOSE SERPL-MCNC: 189 MG/DL (ref 70–99)
HCT VFR BLD AUTO: 34.3 % (ref 40.5–52.5)
HGB BLD-MCNC: 11.1 G/DL (ref 13.5–17.5)
LYMPHOCYTES # BLD: 1.2 K/UL (ref 1–5.1)
LYMPHOCYTES NFR BLD: 34.6 %
MAGNESIUM SERPL-MCNC: 2.11 MG/DL (ref 1.8–2.4)
MCH RBC QN AUTO: 28.7 PG (ref 26–34)
MCHC RBC AUTO-ENTMCNC: 32.4 G/DL (ref 31–36)
MCV RBC AUTO: 88.7 FL (ref 80–100)
MONOCYTES # BLD: 0.7 K/UL (ref 0–1.3)
MONOCYTES NFR BLD: 20 %
NEUTROPHILS # BLD: 1.4 K/UL (ref 1.7–7.7)
NEUTROPHILS NFR BLD: 41.7 %
PERFORMED ON: ABNORMAL
PLATELET # BLD AUTO: 194 K/UL (ref 135–450)
PMV BLD AUTO: 8.8 FL (ref 5–10.5)
POTASSIUM SERPL-SCNC: 4 MMOL/L (ref 3.5–5.1)
PROT SERPL-MCNC: 5.8 G/DL (ref 6.4–8.2)
RBC # BLD AUTO: 3.86 M/UL (ref 4.2–5.9)
REASON FOR REJECTION: NORMAL
REJECTED TEST: NORMAL
SODIUM SERPL-SCNC: 138 MMOL/L (ref 136–145)
TROPONIN, HIGH SENSITIVITY: 121 NG/L (ref 0–22)
VAS LEFT ARM BP: 159 MMHG
VAS LEFT ATA DIST PSV: 26.3 CM/S
VAS LEFT ATA MID PSV: 223 CM/S
VAS LEFT ATA PROX PSV: 64.1 CM/S
VAS LEFT CFA DIST PSV: 134 CM/S
VAS LEFT CFA PROX PSV: 123 CM/S
VAS LEFT DORSALIS PEDIS BP: 255 MMHG
VAS LEFT PERONEAL DIST PSV: 48.5 CM/S
VAS LEFT PERONEAL PROX PSV: 65.2 CM/S
VAS LEFT PFA PROX PSV: 129 CM/S
VAS LEFT POP A DIST PSV: 75.2 CM/S
VAS LEFT POP A PROX PSV: 85.1 CM/S
VAS LEFT POP A PROX VEL RATIO: 0.86
VAS LEFT PTA BP: 200 MMHG
VAS LEFT PTA DIST PSV: 75.8 CM/S
VAS LEFT PTA PROX PSV: 87.6 CM/S
VAS LEFT SFA DIST PSV: 98.8 CM/S
VAS LEFT SFA DIST VEL RATIO: 0.97
VAS LEFT SFA MID PSV: 102 CM/S
VAS LEFT SFA MID VEL RATIO: 0.69
VAS LEFT SFA PROX PSV: 147 CM/S
VAS LEFT SFA PROX VEL RATIO: 1.1
VAS RIGHT ARM BP: 155 MMHG
VAS RIGHT ATA DIST PSV: 36.4 CM/S
VAS RIGHT ATA MID PSV: 0 CM/S
VAS RIGHT ATA PROX PSV: 94.8 CM/S
VAS RIGHT CFA DIST PSV: 157 CM/S
VAS RIGHT CFA PROX PSV: 146 CM/S
VAS RIGHT DORSALIS PEDIS BP: 130 MMHG
VAS RIGHT PERONEAL DIST PSV: 77.6 CM/S
VAS RIGHT PERONEAL PROX PSV: 60.9 CM/S
VAS RIGHT PFA PROX PSV: 177 CM/S
VAS RIGHT POP A DIST PSV: 101 CM/S
VAS RIGHT POP A PROX PSV: 73.5 CM/S
VAS RIGHT POP A PROX VEL RATIO: 1.08
VAS RIGHT PTA BP: 255 MMHG
VAS RIGHT PTA DIST PSV: 69.7 CM/S
VAS RIGHT PTA MID PSV: 220 CM/S
VAS RIGHT PTA PROX PSV: 88 CM/S
VAS RIGHT SFA DIST PSV: 68 CM/S
VAS RIGHT SFA DIST VEL RATIO: 0.6
VAS RIGHT SFA MID PSV: 114 CM/S
VAS RIGHT SFA MID VEL RATIO: 1.2
VAS RIGHT SFA PROX PSV: 97.6 CM/S
VAS RIGHT SFA PROX VEL RATIO: 0.6
WBC # BLD AUTO: 3.4 K/UL (ref 4–11)

## 2025-01-13 PROCEDURE — 93925 LOWER EXTREMITY STUDY: CPT | Performed by: INTERNAL MEDICINE

## 2025-01-13 PROCEDURE — 6360000002 HC RX W HCPCS: Performed by: NURSE PRACTITIONER

## 2025-01-13 PROCEDURE — 6370000000 HC RX 637 (ALT 250 FOR IP): Performed by: INTERNAL MEDICINE

## 2025-01-13 PROCEDURE — 2500000003 HC RX 250 WO HCPCS: Performed by: INTERNAL MEDICINE

## 2025-01-13 PROCEDURE — 85025 COMPLETE CBC W/AUTO DIFF WBC: CPT

## 2025-01-13 PROCEDURE — 84484 ASSAY OF TROPONIN QUANT: CPT

## 2025-01-13 PROCEDURE — 80053 COMPREHEN METABOLIC PANEL: CPT

## 2025-01-13 PROCEDURE — 97161 PT EVAL LOW COMPLEX 20 MIN: CPT

## 2025-01-13 PROCEDURE — 99232 SBSQ HOSP IP/OBS MODERATE 35: CPT | Performed by: SURGERY

## 2025-01-13 PROCEDURE — 97530 THERAPEUTIC ACTIVITIES: CPT

## 2025-01-13 PROCEDURE — 97165 OT EVAL LOW COMPLEX 30 MIN: CPT

## 2025-01-13 PROCEDURE — 6370000000 HC RX 637 (ALT 250 FOR IP): Performed by: STUDENT IN AN ORGANIZED HEALTH CARE EDUCATION/TRAINING PROGRAM

## 2025-01-13 PROCEDURE — 2580000003 HC RX 258: Performed by: NURSE PRACTITIONER

## 2025-01-13 PROCEDURE — 1200000000 HC SEMI PRIVATE

## 2025-01-13 PROCEDURE — 83735 ASSAY OF MAGNESIUM: CPT

## 2025-01-13 PROCEDURE — 6360000002 HC RX W HCPCS: Performed by: INTERNAL MEDICINE

## 2025-01-13 PROCEDURE — APPNB30 APP NON BILLABLE TIME 0-30 MINS: Performed by: NURSE PRACTITIONER

## 2025-01-13 PROCEDURE — APPSS30 APP SPLIT SHARED TIME 16-30 MINUTES: Performed by: NURSE PRACTITIONER

## 2025-01-13 PROCEDURE — 97535 SELF CARE MNGMENT TRAINING: CPT

## 2025-01-13 PROCEDURE — 36415 COLL VENOUS BLD VENIPUNCTURE: CPT

## 2025-01-13 PROCEDURE — 99233 SBSQ HOSP IP/OBS HIGH 50: CPT | Performed by: NURSE PRACTITIONER

## 2025-01-13 PROCEDURE — 90945 DIALYSIS ONE EVALUATION: CPT

## 2025-01-13 RX ORDER — GENTAMICIN SULFATE 1 MG/G
CREAM TOPICAL DAILY PRN
Status: DISCONTINUED | OUTPATIENT
Start: 2025-01-13 | End: 2025-01-16 | Stop reason: HOSPADM

## 2025-01-13 RX ORDER — HYDROMORPHONE HYDROCHLORIDE 1 MG/ML
0.5 INJECTION, SOLUTION INTRAMUSCULAR; INTRAVENOUS; SUBCUTANEOUS EVERY 4 HOURS PRN
Status: DISCONTINUED | OUTPATIENT
Start: 2025-01-13 | End: 2025-01-16 | Stop reason: HOSPADM

## 2025-01-13 RX ADMIN — ASPIRIN 81 MG: 81 TABLET, COATED ORAL at 09:58

## 2025-01-13 RX ADMIN — CARVEDILOL 12.5 MG: 6.25 TABLET, FILM COATED ORAL at 09:58

## 2025-01-13 RX ADMIN — INSULIN GLARGINE 18 UNITS: 100 INJECTION, SOLUTION SUBCUTANEOUS at 20:18

## 2025-01-13 RX ADMIN — SODIUM CHLORIDE 200 MG: 9 INJECTION, SOLUTION INTRAVENOUS at 14:26

## 2025-01-13 RX ADMIN — CARVEDILOL 12.5 MG: 6.25 TABLET, FILM COATED ORAL at 17:20

## 2025-01-13 RX ADMIN — INSULIN LISPRO 4 UNITS: 100 INJECTION, SOLUTION INTRAVENOUS; SUBCUTANEOUS at 10:01

## 2025-01-13 RX ADMIN — SACUBITRIL AND VALSARTAN 1 TABLET: 49; 51 TABLET, FILM COATED ORAL at 20:16

## 2025-01-13 RX ADMIN — Medication 10 ML: at 20:10

## 2025-01-13 RX ADMIN — RANOLAZINE 500 MG: 500 TABLET, EXTENDED RELEASE ORAL at 09:58

## 2025-01-13 RX ADMIN — HEPARIN SODIUM 5000 UNITS: 5000 INJECTION INTRAVENOUS; SUBCUTANEOUS at 07:01

## 2025-01-13 RX ADMIN — GENTAMICIN SULFATE: 1 CREAM TOPICAL at 10:02

## 2025-01-13 RX ADMIN — Medication 10 ML: at 09:58

## 2025-01-13 RX ADMIN — CLOPIDOGREL BISULFATE 75 MG: 75 TABLET ORAL at 09:58

## 2025-01-13 RX ADMIN — SODIUM CHLORIDE, PRESERVATIVE FREE 10 ML: 5 INJECTION INTRAVENOUS at 23:54

## 2025-01-13 RX ADMIN — SERTRALINE HYDROCHLORIDE 50 MG: 50 TABLET ORAL at 20:16

## 2025-01-13 RX ADMIN — SACUBITRIL AND VALSARTAN 1 TABLET: 49; 51 TABLET, FILM COATED ORAL at 09:58

## 2025-01-13 RX ADMIN — HEPARIN SODIUM 5000 UNITS: 5000 INJECTION INTRAVENOUS; SUBCUTANEOUS at 14:22

## 2025-01-13 RX ADMIN — ISOSORBIDE MONONITRATE 30 MG: 30 TABLET, EXTENDED RELEASE ORAL at 20:16

## 2025-01-13 RX ADMIN — HYDROMORPHONE HYDROCHLORIDE 0.5 MG: 1 INJECTION, SOLUTION INTRAMUSCULAR; INTRAVENOUS; SUBCUTANEOUS at 20:14

## 2025-01-13 RX ADMIN — HYDROMORPHONE HYDROCHLORIDE 0.5 MG: 1 INJECTION, SOLUTION INTRAMUSCULAR; INTRAVENOUS; SUBCUTANEOUS at 23:53

## 2025-01-13 RX ADMIN — SEVELAMER CARBONATE 800 MG: 800 TABLET, FILM COATED ORAL at 09:58

## 2025-01-13 RX ADMIN — INSULIN LISPRO 3 UNITS: 100 INJECTION, SOLUTION INTRAVENOUS; SUBCUTANEOUS at 10:02

## 2025-01-13 RX ADMIN — LEVETIRACETAM 500 MG: 500 TABLET, FILM COATED ORAL at 09:58

## 2025-01-13 RX ADMIN — CALCITRIOL CAPSULES 0.25 MCG 0.25 MCG: 0.25 CAPSULE ORAL at 09:58

## 2025-01-13 RX ADMIN — RANOLAZINE 500 MG: 500 TABLET, EXTENDED RELEASE ORAL at 20:16

## 2025-01-13 RX ADMIN — ROSUVASTATIN CALCIUM 10 MG: 10 TABLET, FILM COATED ORAL at 20:16

## 2025-01-13 RX ADMIN — FUROSEMIDE 80 MG: 40 TABLET ORAL at 09:58

## 2025-01-13 RX ADMIN — INSULIN LISPRO 12 UNITS: 100 INJECTION, SOLUTION INTRAVENOUS; SUBCUTANEOUS at 20:18

## 2025-01-13 ASSESSMENT — PAIN SCALES - GENERAL
PAINLEVEL_OUTOF10: 5
PAINLEVEL_OUTOF10: 0
PAINLEVEL_OUTOF10: 8

## 2025-01-13 ASSESSMENT — PAIN DESCRIPTION - ORIENTATION
ORIENTATION: MID
ORIENTATION: MID

## 2025-01-13 ASSESSMENT — PAIN DESCRIPTION - DESCRIPTORS
DESCRIPTORS: STABBING
DESCRIPTORS: HEAVINESS

## 2025-01-13 ASSESSMENT — PAIN DESCRIPTION - LOCATION
LOCATION: CHEST
LOCATION: CHEST

## 2025-01-13 NOTE — FLOWSHEET NOTE
01/12/25 1745   Treatment Team Notification   Reason for Communication Evaluate  (Can patient have melatonin?)   Name of Team Member Notified Sarahy NERI   Treatment Team Role Advanced Practice Nurse   Method of Communication Secure Message   Response Waiting for response

## 2025-01-13 NOTE — DIALYSIS
Treatment time: 13 Hours 24 minutes  Net UF: 2085 ml  Dwell Time: 44 minutes gained    Treatment completed without complications or complaints from patient. Effluent clear/yellow and lines taped to patient per protocol. Patient resting comfortably with VSS upon exiting room.      Copy of dialysis treatment record placed in chart, to be scanned into EMR and report given to Nya Johnson RN.

## 2025-01-13 NOTE — PROGRESS NOTES
Nephrology Progress Note  [facility]  [phone]  [email or web address]        Subjective:   Te Garay: 59 y.o.  male who we are seeing in consultation for  ESRD Management on peritoneal dialysis     Brief HPI:  Te Garay is a 59 y.o. male with PMH significant for  ESRD on PD, CAD/CABG/CHF, carotid artery stenosis, DM 2, diabetic neuropathy, hypertension, hyperlipidemia, seizure disorder came in with complaints of chest pain and shortness of breath, also reports lower extremity edema.   Chest x-ray with no acute process  He also had a x-ray of the right foot that showed soft tissue swelling and ulceration of the fifth digit with no radiographic evidence of osteomyelitis  Of note, cardiology was consulted for elevated troponins, no plans for any intervention at this time     Recently treated for PD related peritonitis, cultures were positive for Staph epidermidis, treated with vancomycin, completed vancomycin on 10/25/2024.  Readmitted with peritonitis again in 2024, cultures again positive for staph epidermidis, discharged on intraperitoneal vancomycin     We are consulted for peritoneal dialysis management    Interval History / Subjective:  - Events overnight reviewed.  - Seen and examined.    - BP in fair range    - Breathing comfortably, sating 95% on room air.   - Reports leg swelling improved.   - no issues with PD . Net UF 164ml       Objective:   VITALS:    Vitals:    25   BP: 137/71   Pulse: 71   Resp: 16   Temp: 98.4 °F (36.9 °C)   SpO2: 99%                                                                                    Temp (24hrs), Av.3 °F (36.8 °C), Min:98.1 °F (36.7 °C), Max:98.6 °F (37 °C)     BP  Min: 137/71  Max: 172/80                                  Pulse  Av.5  Min: 71  Max: 86    24HR INTAKE/OUTPUT:  No intake or output data in the 24 hours ending 25    Wt Readings from Last 3 Encounters:   25 90.3 kg (199 lb 1.2 oz)  overload  Use 2.5% dextrose solutions to facilitate ultrafiltration with PD       Thank you for allowing me to participate in the care of this patient. I will continue to follow along.   Please contact via [employer] if any questions or concerns.     Jordyn Gonzalez MD   [facility] ([facility])  1/12/2025

## 2025-01-13 NOTE — PROGRESS NOTES
[facility] - Inpatient Rehabilitation Department   Phone: [phone]    Physical Therapy    [x] Initial Evaluation            [] Daily Treatment Note         [x] Discharge Summary      Patient: Te Garay   : 1965   MRN: [id number]   Date of Service:  2025  Admitting Diagnosis: Chest pain  Current Admission Summary:  Te Garay is a 59 y.o. male with a pmh of ESRD on PD, PD related peritonitis x 2 (October and 2024), CAD s/p CABG and multiple PCI's, chronic HFrEF, HTN, seizures, CVA involving the TAMICA, PVD, T1DM on insulin pump, DM neuropathy presented from home with complaints of chest pain, lower extremity edema and shortness of breath.   Past Medical History:  has a past medical history of Cardiomyopathy (HCC), Carotid artery stenosis, Coronary artery disease, De Quervain thyroiditis, ESRD (end stage renal disease) (HCC), HFrEF (heart failure with reduced ejection fraction) (HCC), Hyperlipidemia, Hyperparathyroidism (secondary), Hypertension, Overweight, Peripheral neuropathy, Poorly controlled type 1 diabetes mellitus (HCC), PVD (peripheral vascular disease) (HCC), Seizure disorder (HCC), and TIA (transient ischemic attack).  Past Surgical History:  has a past surgical history that includes Coronary artery bypass graft; Cardiac catheterization; hernia repair; Tonsillectomy; LAPAROSCOPY INSERTION PERITONEAL CATHETER (N/A, 2020); Carotid stent placement (Right); Bladder surgery (Left, 2023); Foot Debridement (Left, 10/16/2023); Foot Debridement (Left, 10/23/2023); Cardiac procedure (N/A, 2024); Cardiac procedure (N/A, 2024); Cardiac procedure (N/A, 2024); Cardiac procedure (N/A, 2024); Foot surgery (Left, 2024); and Ankle surgery (Left, 2024).  Discharge Recommendations: Te Garay scored a 24/24 on the AM-PAC short mobility form.  At this time, no further PT is recommended upon discharge due to independence.  Recommend

## 2025-01-13 NOTE — PROGRESS NOTES
Admit Date: 1/10/2025  Diet: ADULT DIET; Regular; 4 carb choices (60 gm/meal); No Caffeine    CC: Chest pain    Interval history:   Overnight, there were no acute events. Patient's vitals remained stable    Patient was seen this morning. I discussed the plan of the day with him in detail. All questions were addressed and answered to patient's satisfaction.   Patient denies fevers, chills, nausea, vomiting, chest pain, shortness of breath, diarrhea, constipation, dysuria, urinary frequency or urgency.     Plan:     -Control BG with goal b/w 140-180  -Will wait for evaluation from vascular sx regarding dry gangrene    Assessment:   Te Garay is a 59 y.o. male with PMH of ESRD on PD, PD related peritonitis x 2 (October and November 2024), CAD s/p CABG and multiple PCI's, chronic HFrEF, HTN, seizures, CVA involving the TAMICA, PVD, T1DM on insulin pump, DM neuropathy  who was admitted with chest pain    Chest pain with chronically elevated troponin:  CAD s/p CABG and multiple PCI:  Recent LHC with SVG-OM1 stent 12/2024   TTE 10/25/2024: LVEF 40-45 %.  Cardiology consult appreciated: Trop stable, not c/w ACS.    Continue Plavix, ASA, Ranexa, Coreg, Crestor, Imdur.     Peritonitis associated with PD:  2 different episodes in October and November 2024   Blood cultures positive for Staph epidermidis.    Completed antibiotic treatment with vancomycin.       HFrEF without acute decompensation: Continue Entresto, Lasix and PD.     History of CVA: Continue aspirin/Plavix and statin.     Insulin dependent diabetes mellitus with neuropathy:   A1c 8.6 % on 10/14/2024.  Follow-up repeat A1c.  Insulin pump not available at this time.  Started on basal/prandial insulin with SSI.     ESRD on PD with volume overload:   Nephrology consulted. Continue CCPD.  Continue Renvela.     Seizure disorder: Continue AEDs.  Seizure precautions.     Hypomagnesemia: Replaced.  Monitor electrolytes and replace as needed.     Right fifth toe    AST 17 16 17   ALT 9* 9* 8*   BILITOT <0.2 <0.2 <0.2   ALKPHOS 107 108 104     Troponin: No results for input(s): \"TROPONINI\" in the last 72 hours.  BNP: No results for input(s): \"BNP\" in the last 72 hours.  Lipids: No results for input(s): \"CHOL\", \"HDL\" in the last 72 hours.    Invalid input(s): \"LDLCALCU\", \"TRIGLYCERIDE\"  ABGs:  No results for input(s): \"PHART\", \"TLF7OYD\", \"PO2ART\", \"RDE6CSC\", \"BEART\", \"THGBART\", \"S4JAACTU\", \"SAW6OYS\" in the last 72 hours.    INR:   No results for input(s): \"INR\" in the last 72 hours.    Lactate: No results for input(s): \"LACTATE\" in the last 72 hours.  Cultures:  -----------------------------------------------------------------  RAD:   Vascular duplex lower extremity arteries bilateral   Final Result      XR TOE RIGHT (MIN 2 VIEWS)   Final Result   Soft tissue swelling and ulceration of the 5th digit with no radiographic   evidence of osteomyelitis.         XR CHEST (2 VW)   Final Result   No acute process.             Medications:     Scheduled Meds:   insulin glargine  18 Units SubCUTAneous Nightly    insulin lispro  3 Units SubCUTAneous TID     insulin lispro  0-16 Units SubCUTAneous 4x Daily AC & HS    iron sucrose  200 mg IntraVENous Q24H    aspirin  81 mg Oral Daily    calcitRIOL  0.25 mcg Oral Daily    carvedilol  12.5 mg Oral BID     clopidogrel  75 mg Oral Daily    isosorbide mononitrate  30 mg Oral Nightly    levETIRAcetam  500 mg Oral Daily    ranolazine  500 mg Oral BID    rosuvastatin  10 mg Oral Nightly    sacubitril-valsartan  1 tablet Oral BID    sertraline  50 mg Oral Nightly    sodium chloride flush  5-40 mL IntraVENous 2 times per day    heparin (porcine)  5,000 Units SubCUTAneous 3 times per day    furosemide  80 mg Oral Daily    sevelamer  800 mg Oral TID WC    gentamicin   Topical Daily     Continuous Infusions:   dextrose      sodium chloride       PRN Meds:HYDROmorphone, dextrose bolus **OR** dextrose bolus, glucagon (rDNA), dextrose, sodium chloride
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
## 2025-01-13 NOTE — PROGRESS NOTES
Shift assessment complete. Vital signs stable. Telemetry on. Respirations even and unlabored. Lung sounds are clear and diminished. Bowel sounds active. Pt has had multiple bowel movements today per patient. Medications given per MAR. Pt denies any further needs. Call light within reach.

## 2025-01-13 NOTE — PROGRESS NOTES
CCPD Order   Exchanges: 5    Exchange Volume: 2500 ml   Total Time: 12 hrs   Dextrose: 2.5%   Last Fill: 0 ml   Total Volume: 14091 ml     Orders verified. Supplies taken to pt's room. Report received. Cycler set up, primed and pre tested. Dressing changed on [facility] Cath site. Pt connected to cycler. CCPD initiated without problem. Initial effluent clear.       If problems should arise please call the [phone] on top of PD cycler machine.

## 2025-01-13 NOTE — PROGRESS NOTES
[facility] - Inpatient Rehabilitation Department   Phone: [phone]    Occupational Therapy    [x] Initial Evaluation            [] Daily Treatment Note         [x] Discharge Summary      Patient: Te Garay   : 1965   MRN: [id number]   Date of Service:  2025    Admitting Diagnosis:  Chest pain  Current Admission Summary: Te Garay is a 59 y.o. male with a pmh of ESRD on PD, PD related peritonitis x 2 (October and 2024), CAD s/p CABG and multiple PCI's, chronic HFrEF, HTN, seizures, CVA involving the TAMICA, PVD, T1DM on insulin pump, DM neuropathy presented from home with complaints of chest pain, lower extremity edema and shortness of breath.    Past Medical History:  has a past medical history of Cardiomyopathy (HCC), Carotid artery stenosis, Coronary artery disease, De Quervain thyroiditis, ESRD (end stage renal disease) (HCC), HFrEF (heart failure with reduced ejection fraction) (HCC), Hyperlipidemia, Hyperparathyroidism (secondary), Hypertension, Overweight, Peripheral neuropathy, Poorly controlled type 1 diabetes mellitus (HCC), PVD (peripheral vascular disease) (HCC), Seizure disorder (HCC), and TIA (transient ischemic attack).  Past Surgical History:  has a past surgical history that includes Coronary artery bypass graft; Cardiac catheterization; hernia repair; Tonsillectomy; LAPAROSCOPY INSERTION PERITONEAL CATHETER (N/A, 2020); Carotid stent placement (Right); Bladder surgery (Left, 2023); Foot Debridement (Left, 10/16/2023); Foot Debridement (Left, 10/23/2023); Cardiac procedure (N/A, 2024); Cardiac procedure (N/A, 2024); Cardiac procedure (N/A, 2024); Cardiac procedure (N/A, 2024); Foot surgery (Left, 2024); and Ankle surgery (Left, 2024).    Discharge Recommendations: Te Garay scored a 24/24 on the AM-PAC ADL Inpatient form.  At this time, no further OT is recommended upon discharge due to patient at

## 2025-01-13 NOTE — FLOWSHEET NOTE
01/13/25 0425   Treatment Team Notification   Reason for Communication Evaluate  (Lower extremity vascular duplex)   Name of Team Member Notified Sarahy NERI   Treatment Team Role Advanced Practice Nurse   Method of Communication Secure Message   Response Waiting for response

## 2025-01-13 NOTE — PROGRESS NOTES
Vascular Progress Note    1/13/2025 8:14 AM    Chief complaint / Reason for visit : PAD, right 5th toe ulcer    Subjective:  Patient resting in bed.  He reports he is doing okay.  No new complaints.  VSS, afebrile.     Vital Signs: BP (!) 173/84   Pulse 79   Temp 98.2 °F (36.8 °C) (Oral)   Resp 18   Ht 1.727 m (5' 8\")   Wt 90.3 kg (199 lb 1.2 oz)   SpO2 100%   BMI 30.27 kg/m²  O2 Flow Rate (L/min): 0 L/min   I/O:  No intake or output data in the 24 hours ending 01/13/25 0814    Physical Exam:   General: no apparent distress, appears stated age  Chest/Lungs:  no accessory muscle use  Cardiac:  regular rate and rhythm  Abdomen:  abdomen is soft without significant tenderness,+ PD catheter   Vascular:  + weak right DP/PT doppler, + left DP/PT doppler  Extremities: warm, no signs of cyanosis or ischemia, no significant edema, bilateral upper and lower extremity motorsensory intact  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      Labs:   Lab Results   Component Value Date/Time     01/13/2025 04:48 AM    K 4.0 01/13/2025 04:48 AM    K 3.9 11/14/2024 06:09 AM     01/13/2025 04:48 AM    CO2 28 01/13/2025 04:48 AM    BUN 34 01/13/2025 04:48 AM    CREATININE 5.9 01/13/2025 04:48 AM    GFRAA 9 10/03/2022 04:45 AM    GFRAA 44 06/15/2013 01:00 AM    LABGLOM 10 01/13/2025 04:48 AM    LABGLOM 6 04/30/2024 04:51 AM    GLUCOSE 189 01/13/2025 04:48 AM    PHOS 3.3 01/11/2025 05:35 AM    MG 2.11 01/13/2025 04:48 AM    CALCIUM 8.8 01/13/2025 04:48 AM     Lab Results   Component Value Date/Time    WBC 3.4 01/13/2025 04:48 AM    RBC 3.86 01/13/2025 04:48 AM    HGB 11.1 01/13/2025 04:48 AM    HCT 34.3 01/13/2025 04:48 AM    MCV 88.7 01/13/2025 04:48 AM    RDW 17.5 01/13/2025 04:48 AM     01/13/2025 04:48 AM     Lab Results   Component Value Date    INR 0.96 01/10/2025    PROTIME 13.0 01/10/2025        Imaging:    BLE arterial duplex 1/13/25:  Interpretation Summary  Show Result Comparison     Right side findings: Resting CATHI

## 2025-01-13 NOTE — PROGRESS NOTES
[facility]   Nephrology progress Note  [phone]  [phone]   [email or web address]           SUMMARY :  We are following this patient for ESRD on PD    59 y.o. male with PMH significant for  ESRD on PD, CAD/CABG/CHF, carotid artery stenosis, DM 2, diabetic neuropathy, hypertension, hyperlipidemia, seizure disorder came in with complaints of chest pain and shortness of breath, also reports lower extremity edema.   Chest x-ray with no acute process  He also had a x-ray of the right foot that showed soft tissue swelling and ulceration of the fifth digit with no radiographic evidence of osteomyelitis  Of note, cardiology was consulted for elevated troponins, no plans for any intervention at this time  Recently treated for PD related peritonitis, cultures were positive for Staph epidermidis, treated with vancomycin, completed vancomycin on 10/25/2024. Readmitted with peritonitis again in 2024, cultures again positive for staph epidermidis, discharged on intraperitoneal vancomycin     SUBJECTIVE:   Patient progress reviewed.  25:  The patient denies any shortness of breath or chest pain.  Peritoneal dialysis ongoing normally    Physical Exam:    VITALS:  BP (!) 149/82   Pulse 80   Temp 98.2 °F (36.8 °C) (Oral)   Resp 18   Ht 1.727 m (5' 8\")   Wt 90.4 kg (199 lb 3.2 oz)   SpO2 99%   BMI 30.29 kg/m²   BLOOD PRESSURE RANGE:  Systolic (24hrs), Av , Min:135 , Max:173   ; Diastolic (24hrs), Av, Min:67, Max:86    24HR INTAKE/OUTPUT:  No intake or output data in the 24 hours ending 25 1232    Gen:  alert, oriented x 3  PERRL , EOM +  Pallor +, No icterus  JVP not raised   CV: RRR no murmur or rub .  Midsternal scar of CABG  Lungs:B/ L air entry, Normal breath sounds   Abd: soft, bowel sounds + , No organomegaly , PD catheter present  Ext: Leg edema present, no cyanosis  Skin: Warm.  No rash  Neuro: nonfocal.      DATA:    CBC with Differential:    Lab Results   Component

## 2025-01-13 NOTE — PROGRESS NOTES
Physical Therapy  Te Garay    Attempted to see pt for PT/OT evaluation. Patient currently on peritoneal dialysis. Discussed with nursing. Anticipate patient to be taken off dialysis around 11. Will hold therapy at this time and will follow up with pt in afternoon as schedule permits. Thanks, Elizabeth Morales, PT, DPT [id number], Clementina Gibbons, OTR/L, [id number]

## 2025-01-14 ENCOUNTER — APPOINTMENT (OUTPATIENT)
Age: 60
End: 2025-01-14
Payer: MEDICARE

## 2025-01-14 LAB
ALBUMIN SERPL-MCNC: 3.4 G/DL (ref 3.4–5)
ALBUMIN/GLOB SERPL: 1.3 {RATIO} (ref 1.1–2.2)
ALP SERPL-CCNC: 118 U/L (ref 40–129)
ALT SERPL-CCNC: 9 U/L (ref 10–40)
ANION GAP SERPL CALCULATED.3IONS-SCNC: 9 MMOL/L (ref 3–16)
AST SERPL-CCNC: 16 U/L (ref 15–37)
BASOPHILS # BLD: 0 K/UL (ref 0–0.2)
BASOPHILS NFR BLD: 0.4 %
BILIRUB SERPL-MCNC: <0.2 MG/DL (ref 0–1)
BUN SERPL-MCNC: 36 MG/DL (ref 7–20)
CALCIUM SERPL-MCNC: 9.2 MG/DL (ref 8.3–10.6)
CHLORIDE SERPL-SCNC: 98 MMOL/L (ref 99–110)
CO2 SERPL-SCNC: 29 MMOL/L (ref 21–32)
CREAT SERPL-MCNC: 6 MG/DL (ref 0.9–1.3)
CRP SERPL-MCNC: 3.9 MG/L (ref 0–5.1)
DEPRECATED RDW RBC AUTO: 17.4 % (ref 12.4–15.4)
ECHO AO ASC DIAM: 3 CM
ECHO AO ASCENDING AORTA INDEX: 1.47 CM/M2
ECHO AO ROOT DIAM: 2.6 CM
ECHO AO ROOT INDEX: 1.27 CM/M2
ECHO BSA: 2.08 M2
ECHO BSA: 2.08 M2
ECHO LA DIAMETER INDEX: 1.76 CM/M2
ECHO LA DIAMETER: 3.6 CM
ECHO LA TO AORTIC ROOT RATIO: 1.38
ECHO LV EDV A2C: 126 ML
ECHO LV EDV A4C: 136 ML
ECHO LV EDV INDEX A4C: 67 ML/M2
ECHO LV EDV NDEX A2C: 62 ML/M2
ECHO LV EJECTION FRACTION A2C: 26 %
ECHO LV EJECTION FRACTION A4C: 51 %
ECHO LV EJECTION FRACTION BIPLANE: 37 % (ref 55–100)
ECHO LV ESV A2C: 94 ML
ECHO LV ESV A4C: 67 ML
ECHO LV ESV INDEX A2C: 46 ML/M2
ECHO LV ESV INDEX A4C: 33 ML/M2
ECHO LV FRACTIONAL SHORTENING: 9 % (ref 28–44)
ECHO LV INTERNAL DIMENSION DIASTOLE INDEX: 2.6 CM/M2
ECHO LV INTERNAL DIMENSION DIASTOLIC: 5.3 CM (ref 4.2–5.9)
ECHO LV INTERNAL DIMENSION SYSTOLIC INDEX: 2.35 CM/M2
ECHO LV INTERNAL DIMENSION SYSTOLIC: 4.8 CM
ECHO LV IVSD: 0.8 CM (ref 0.6–1)
ECHO LV MASS 2D: 162.1 G (ref 88–224)
ECHO LV MASS INDEX 2D: 79.5 G/M2 (ref 49–115)
ECHO LV POSTERIOR WALL DIASTOLIC: 0.9 CM (ref 0.6–1)
ECHO LV RELATIVE WALL THICKNESS RATIO: 0.34
ECHO LVOT AREA: 3.5 CM2
ECHO LVOT DIAM: 2.1 CM
EKG ATRIAL RATE: 87 BPM
EKG DIAGNOSIS: NORMAL
EKG P AXIS: 55 DEGREES
EKG P-R INTERVAL: 142 MS
EKG Q-T INTERVAL: 394 MS
EKG QRS DURATION: 104 MS
EKG QTC CALCULATION (BAZETT): 474 MS
EKG R AXIS: 39 DEGREES
EKG T AXIS: 80 DEGREES
EKG VENTRICULAR RATE: 87 BPM
EOSINOPHIL # BLD: 0.1 K/UL (ref 0–0.6)
EOSINOPHIL NFR BLD: 1.9 %
ERYTHROCYTE [SEDIMENTATION RATE] IN BLOOD BY WESTERGREN METHOD: 62 MM/HR (ref 0–20)
GFR SERPLBLD CREATININE-BSD FMLA CKD-EPI: 10 ML/MIN/{1.73_M2}
GLUCOSE BLD-MCNC: 233 MG/DL (ref 70–99)
GLUCOSE BLD-MCNC: 242 MG/DL (ref 70–99)
GLUCOSE BLD-MCNC: 244 MG/DL (ref 70–99)
GLUCOSE BLD-MCNC: 254 MG/DL (ref 70–99)
GLUCOSE BLD-MCNC: 319 MG/DL (ref 70–99)
GLUCOSE BLD-MCNC: 356 MG/DL (ref 70–99)
GLUCOSE BLD-MCNC: 76 MG/DL (ref 70–99)
GLUCOSE BLD-MCNC: 82 MG/DL (ref 70–99)
GLUCOSE SERPL-MCNC: 190 MG/DL (ref 70–99)
HCT VFR BLD AUTO: 33.4 % (ref 40.5–52.5)
HGB BLD-MCNC: 11.1 G/DL (ref 13.5–17.5)
LYMPHOCYTES # BLD: 1 K/UL (ref 1–5.1)
LYMPHOCYTES NFR BLD: 24.4 %
MAGNESIUM SERPL-MCNC: 2.13 MG/DL (ref 1.8–2.4)
MCH RBC QN AUTO: 29.1 PG (ref 26–34)
MCHC RBC AUTO-ENTMCNC: 33.2 G/DL (ref 31–36)
MCV RBC AUTO: 87.7 FL (ref 80–100)
MONOCYTES # BLD: 0.6 K/UL (ref 0–1.3)
MONOCYTES NFR BLD: 15.1 %
NEUTROPHILS # BLD: 2.4 K/UL (ref 1.7–7.7)
NEUTROPHILS NFR BLD: 58.2 %
PERFORMED ON: ABNORMAL
PERFORMED ON: NORMAL
PERFORMED ON: NORMAL
PHOSPHATE SERPL-MCNC: 3.1 MG/DL (ref 2.5–4.9)
PLATELET # BLD AUTO: 197 K/UL (ref 135–450)
PMV BLD AUTO: 8.8 FL (ref 5–10.5)
POC ACT LR: 282 SEC
POTASSIUM SERPL-SCNC: 4.1 MMOL/L (ref 3.5–5.1)
PROT SERPL-MCNC: 6.1 G/DL (ref 6.4–8.2)
RBC # BLD AUTO: 3.81 M/UL (ref 4.2–5.9)
SODIUM SERPL-SCNC: 136 MMOL/L (ref 136–145)
TROPONIN, HIGH SENSITIVITY: 130 NG/L (ref 0–22)
WBC # BLD AUTO: 4 K/UL (ref 4–11)

## 2025-01-14 PROCEDURE — 99233 SBSQ HOSP IP/OBS HIGH 50: CPT | Performed by: NURSE PRACTITIONER

## 2025-01-14 PROCEDURE — 93308 TTE F-UP OR LMTD: CPT | Performed by: INTERNAL MEDICINE

## 2025-01-14 PROCEDURE — 6370000000 HC RX 637 (ALT 250 FOR IP): Performed by: SURGERY

## 2025-01-14 PROCEDURE — APPSS30 APP SPLIT SHARED TIME 16-30 MINUTES: Performed by: NURSE PRACTITIONER

## 2025-01-14 PROCEDURE — 6370000000 HC RX 637 (ALT 250 FOR IP): Performed by: NURSE PRACTITIONER

## 2025-01-14 PROCEDURE — 75710 ARTERY X-RAYS ARM/LEG: CPT | Performed by: SURGERY

## 2025-01-14 PROCEDURE — 2709999900 HC NON-CHARGEABLE SUPPLY: Performed by: SURGERY

## 2025-01-14 PROCEDURE — 6370000000 HC RX 637 (ALT 250 FOR IP): Performed by: INTERNAL MEDICINE

## 2025-01-14 PROCEDURE — 6360000002 HC RX W HCPCS: Performed by: SURGERY

## 2025-01-14 PROCEDURE — 2500000003 HC RX 250 WO HCPCS: Performed by: SURGERY

## 2025-01-14 PROCEDURE — 6360000004 HC RX CONTRAST MEDICATION: Performed by: SURGERY

## 2025-01-14 PROCEDURE — 2500000003 HC RX 250 WO HCPCS: Performed by: INTERNAL MEDICINE

## 2025-01-14 PROCEDURE — 99153 MOD SED SAME PHYS/QHP EA: CPT | Performed by: SURGERY

## 2025-01-14 PROCEDURE — 85347 COAGULATION TIME ACTIVATED: CPT

## 2025-01-14 PROCEDURE — 37228 HC TIB PER TERRITORY PLASTY: CPT | Performed by: SURGERY

## 2025-01-14 PROCEDURE — 85025 COMPLETE CBC W/AUTO DIFF WBC: CPT

## 2025-01-14 PROCEDURE — 93325 DOPPLER ECHO COLOR FLOW MAPG: CPT | Performed by: INTERNAL MEDICINE

## 2025-01-14 PROCEDURE — 1200000000 HC SEMI PRIVATE

## 2025-01-14 PROCEDURE — 6360000002 HC RX W HCPCS: Performed by: INTERNAL MEDICINE

## 2025-01-14 PROCEDURE — 36415 COLL VENOUS BLD VENIPUNCTURE: CPT

## 2025-01-14 PROCEDURE — APPNB30 APP NON BILLABLE TIME 0-30 MINS: Performed by: NURSE PRACTITIONER

## 2025-01-14 PROCEDURE — C1894 INTRO/SHEATH, NON-LASER: HCPCS | Performed by: SURGERY

## 2025-01-14 PROCEDURE — 047R3ZZ DILATION OF RIGHT POSTERIOR TIBIAL ARTERY, PERCUTANEOUS APPROACH: ICD-10-PCS | Performed by: SURGERY

## 2025-01-14 PROCEDURE — 84100 ASSAY OF PHOSPHORUS: CPT

## 2025-01-14 PROCEDURE — 6370000000 HC RX 637 (ALT 250 FOR IP): Performed by: STUDENT IN AN ORGANIZED HEALTH CARE EDUCATION/TRAINING PROGRAM

## 2025-01-14 PROCEDURE — 90945 DIALYSIS ONE EVALUATION: CPT

## 2025-01-14 PROCEDURE — 37232 HC TIB PER TERR ADDL PLASTY: CPT | Performed by: SURGERY

## 2025-01-14 PROCEDURE — B4101ZZ FLUOROSCOPY OF ABDOMINAL AORTA USING LOW OSMOLAR CONTRAST: ICD-10-PCS | Performed by: SURGERY

## 2025-01-14 PROCEDURE — 83735 ASSAY OF MAGNESIUM: CPT

## 2025-01-14 PROCEDURE — 6360000004 HC RX CONTRAST MEDICATION: Performed by: NURSE PRACTITIONER

## 2025-01-14 PROCEDURE — C8924 2D TTE W OR W/O FOL W/CON,FU: HCPCS

## 2025-01-14 PROCEDURE — 75625 CONTRAST EXAM ABDOMINL AORTA: CPT | Performed by: SURGERY

## 2025-01-14 PROCEDURE — 86140 C-REACTIVE PROTEIN: CPT

## 2025-01-14 PROCEDURE — 75774 ARTERY X-RAY EACH VESSEL: CPT | Performed by: SURGERY

## 2025-01-14 PROCEDURE — C1760 CLOSURE DEV, VASC: HCPCS | Performed by: SURGERY

## 2025-01-14 PROCEDURE — 37232 PR REVSC OPN/PRQ TIB/PERO W/ANGIOPLASTY UNI EA VSL: CPT | Performed by: SURGERY

## 2025-01-14 PROCEDURE — 84484 ASSAY OF TROPONIN QUANT: CPT

## 2025-01-14 PROCEDURE — 047P3ZZ DILATION OF RIGHT ANTERIOR TIBIAL ARTERY, PERCUTANEOUS APPROACH: ICD-10-PCS | Performed by: SURGERY

## 2025-01-14 PROCEDURE — 85652 RBC SED RATE AUTOMATED: CPT

## 2025-01-14 PROCEDURE — C1725 CATH, TRANSLUMIN NON-LASER: HCPCS | Performed by: SURGERY

## 2025-01-14 PROCEDURE — C1769 GUIDE WIRE: HCPCS | Performed by: SURGERY

## 2025-01-14 PROCEDURE — 99152 MOD SED SAME PHYS/QHP 5/>YRS: CPT | Performed by: SURGERY

## 2025-01-14 PROCEDURE — 80053 COMPREHEN METABOLIC PANEL: CPT

## 2025-01-14 PROCEDURE — 37228 PR REVSC OPN/PRQ TIB/PERO W/ANGIOPLASTY UNI: CPT | Performed by: SURGERY

## 2025-01-14 PROCEDURE — 93010 ELECTROCARDIOGRAM REPORT: CPT | Performed by: INTERNAL MEDICINE

## 2025-01-14 PROCEDURE — B41F1ZZ FLUOROSCOPY OF RIGHT LOWER EXTREMITY ARTERIES USING LOW OSMOLAR CONTRAST: ICD-10-PCS | Performed by: SURGERY

## 2025-01-14 PROCEDURE — C1887 CATHETER, GUIDING: HCPCS | Performed by: SURGERY

## 2025-01-14 PROCEDURE — 93005 ELECTROCARDIOGRAM TRACING: CPT | Performed by: NURSE PRACTITIONER

## 2025-01-14 RX ORDER — SODIUM CHLORIDE 9 MG/ML
INJECTION, SOLUTION INTRAVENOUS PRN
Status: DISCONTINUED | OUTPATIENT
Start: 2025-01-14 | End: 2025-01-16 | Stop reason: HOSPADM

## 2025-01-14 RX ORDER — HEPARIN SODIUM 1000 [USP'U]/ML
INJECTION, SOLUTION INTRAVENOUS; SUBCUTANEOUS PRN
Status: DISCONTINUED | OUTPATIENT
Start: 2025-01-14 | End: 2025-01-14 | Stop reason: HOSPADM

## 2025-01-14 RX ORDER — SODIUM CHLORIDE 0.9 % (FLUSH) 0.9 %
5-40 SYRINGE (ML) INJECTION PRN
Status: DISCONTINUED | OUTPATIENT
Start: 2025-01-14 | End: 2025-01-16 | Stop reason: HOSPADM

## 2025-01-14 RX ORDER — ACETAMINOPHEN 325 MG/1
650 TABLET ORAL EVERY 4 HOURS PRN
Status: DISCONTINUED | OUTPATIENT
Start: 2025-01-14 | End: 2025-01-14 | Stop reason: SDUPTHER

## 2025-01-14 RX ORDER — METHYLPREDNISOLONE SODIUM SUCCINATE 125 MG/2ML
INJECTION INTRAMUSCULAR; INTRAVENOUS PRN
Status: DISCONTINUED | OUTPATIENT
Start: 2025-01-14 | End: 2025-01-14 | Stop reason: HOSPADM

## 2025-01-14 RX ORDER — DIPHENHYDRAMINE HYDROCHLORIDE 50 MG/ML
INJECTION INTRAMUSCULAR; INTRAVENOUS PRN
Status: DISCONTINUED | OUTPATIENT
Start: 2025-01-14 | End: 2025-01-14 | Stop reason: HOSPADM

## 2025-01-14 RX ORDER — CLOPIDOGREL 300 MG/1
300 TABLET, FILM COATED ORAL ONCE
Status: CANCELLED | OUTPATIENT
Start: 2025-01-14 | End: 2025-01-14

## 2025-01-14 RX ORDER — SODIUM CHLORIDE 0.9 % (FLUSH) 0.9 %
5-40 SYRINGE (ML) INJECTION EVERY 12 HOURS SCHEDULED
Status: DISCONTINUED | OUTPATIENT
Start: 2025-01-14 | End: 2025-01-16 | Stop reason: HOSPADM

## 2025-01-14 RX ORDER — IOPAMIDOL 755 MG/ML
INJECTION, SOLUTION INTRAVASCULAR PRN
Status: DISCONTINUED | OUTPATIENT
Start: 2025-01-14 | End: 2025-01-14 | Stop reason: HOSPADM

## 2025-01-14 RX ORDER — MIDAZOLAM HYDROCHLORIDE 1 MG/ML
INJECTION, SOLUTION INTRAMUSCULAR; INTRAVENOUS PRN
Status: DISCONTINUED | OUTPATIENT
Start: 2025-01-14 | End: 2025-01-14 | Stop reason: HOSPADM

## 2025-01-14 RX ORDER — NITROGLYCERIN 20 MG/100ML
INJECTION INTRAVENOUS CONTINUOUS PRN
Status: COMPLETED | OUTPATIENT
Start: 2025-01-14 | End: 2025-01-14

## 2025-01-14 RX ORDER — FENTANYL CITRATE 50 UG/ML
INJECTION, SOLUTION INTRAMUSCULAR; INTRAVENOUS PRN
Status: DISCONTINUED | OUTPATIENT
Start: 2025-01-14 | End: 2025-01-14 | Stop reason: HOSPADM

## 2025-01-14 RX ORDER — CLOPIDOGREL BISULFATE 75 MG/1
75 TABLET ORAL DAILY
Status: DISCONTINUED | OUTPATIENT
Start: 2025-01-15 | End: 2025-01-16 | Stop reason: HOSPADM

## 2025-01-14 RX ORDER — INSULIN LISPRO 100 [IU]/ML
5 INJECTION, SOLUTION INTRAVENOUS; SUBCUTANEOUS
Status: DISCONTINUED | OUTPATIENT
Start: 2025-01-14 | End: 2025-01-16 | Stop reason: HOSPADM

## 2025-01-14 RX ORDER — PROTAMINE SULFATE 10 MG/ML
INJECTION, SOLUTION INTRAVENOUS PRN
Status: DISCONTINUED | OUTPATIENT
Start: 2025-01-14 | End: 2025-01-14 | Stop reason: HOSPADM

## 2025-01-14 RX ORDER — INSULIN LISPRO 100 [IU]/ML
4 INJECTION, SOLUTION INTRAVENOUS; SUBCUTANEOUS ONCE
Status: COMPLETED | OUTPATIENT
Start: 2025-01-14 | End: 2025-01-14

## 2025-01-14 RX ADMIN — SEVELAMER CARBONATE 800 MG: 800 TABLET, FILM COATED ORAL at 08:48

## 2025-01-14 RX ADMIN — INSULIN LISPRO 4 UNITS: 100 INJECTION, SOLUTION INTRAVENOUS; SUBCUTANEOUS at 01:11

## 2025-01-14 RX ADMIN — SEVELAMER CARBONATE 800 MG: 800 TABLET, FILM COATED ORAL at 12:10

## 2025-01-14 RX ADMIN — CALCITRIOL CAPSULES 0.25 MCG 0.25 MCG: 0.25 CAPSULE ORAL at 08:47

## 2025-01-14 RX ADMIN — SACUBITRIL AND VALSARTAN 1 TABLET: 49; 51 TABLET, FILM COATED ORAL at 20:42

## 2025-01-14 RX ADMIN — RANOLAZINE 500 MG: 500 TABLET, EXTENDED RELEASE ORAL at 08:47

## 2025-01-14 RX ADMIN — INSULIN LISPRO 4 UNITS: 100 INJECTION, SOLUTION INTRAVENOUS; SUBCUTANEOUS at 20:43

## 2025-01-14 RX ADMIN — LEVETIRACETAM 500 MG: 500 TABLET, FILM COATED ORAL at 08:47

## 2025-01-14 RX ADMIN — ASPIRIN 81 MG: 81 TABLET, COATED ORAL at 08:47

## 2025-01-14 RX ADMIN — FUROSEMIDE 80 MG: 40 TABLET ORAL at 08:47

## 2025-01-14 RX ADMIN — SERTRALINE HYDROCHLORIDE 50 MG: 50 TABLET ORAL at 20:42

## 2025-01-14 RX ADMIN — Medication 10 ML: at 20:44

## 2025-01-14 RX ADMIN — INSULIN GLARGINE 18 UNITS: 100 INJECTION, SOLUTION SUBCUTANEOUS at 20:42

## 2025-01-14 RX ADMIN — INSULIN LISPRO 3 UNITS: 100 INJECTION, SOLUTION INTRAVENOUS; SUBCUTANEOUS at 08:49

## 2025-01-14 RX ADMIN — CLOPIDOGREL BISULFATE 75 MG: 75 TABLET ORAL at 08:48

## 2025-01-14 RX ADMIN — ONDANSETRON HYDROCHLORIDE 4 MG: 2 SOLUTION INTRAMUSCULAR; INTRAVENOUS at 00:38

## 2025-01-14 RX ADMIN — INSULIN LISPRO 3 UNITS: 100 INJECTION, SOLUTION INTRAVENOUS; SUBCUTANEOUS at 12:12

## 2025-01-14 RX ADMIN — CARVEDILOL 12.5 MG: 6.25 TABLET, FILM COATED ORAL at 08:47

## 2025-01-14 RX ADMIN — ISOSORBIDE MONONITRATE 30 MG: 30 TABLET, EXTENDED RELEASE ORAL at 20:42

## 2025-01-14 RX ADMIN — SACUBITRIL AND VALSARTAN 1 TABLET: 49; 51 TABLET, FILM COATED ORAL at 08:47

## 2025-01-14 RX ADMIN — RANOLAZINE 500 MG: 500 TABLET, EXTENDED RELEASE ORAL at 20:42

## 2025-01-14 RX ADMIN — SEVELAMER CARBONATE 800 MG: 800 TABLET, FILM COATED ORAL at 19:26

## 2025-01-14 RX ADMIN — CARVEDILOL 12.5 MG: 6.25 TABLET, FILM COATED ORAL at 19:26

## 2025-01-14 RX ADMIN — INSULIN LISPRO 4 UNITS: 100 INJECTION, SOLUTION INTRAVENOUS; SUBCUTANEOUS at 08:50

## 2025-01-14 RX ADMIN — SULFUR HEXAFLUORIDE 2 ML: 60.7; .19; .19 INJECTION, POWDER, LYOPHILIZED, FOR SUSPENSION INTRAVENOUS; INTRAVESICAL at 11:04

## 2025-01-14 RX ADMIN — ROSUVASTATIN CALCIUM 10 MG: 10 TABLET, FILM COATED ORAL at 20:42

## 2025-01-14 RX ADMIN — Medication 10 ML: at 08:47

## 2025-01-14 ASSESSMENT — PAIN SCALES - GENERAL
PAINLEVEL_OUTOF10: 2
PAINLEVEL_OUTOF10: 0
PAINLEVEL_OUTOF10: 2

## 2025-01-14 ASSESSMENT — PAIN SCALES - WONG BAKER: WONGBAKER_NUMERICALRESPONSE: HURTS A LITTLE BIT

## 2025-01-14 NOTE — PROGRESS NOTES
Hospitalist Progress Note      PCP: Reid Lei MD    Date of Admission: 1/10/2025    LOS: 4    Chief Complaint:   Chief Complaint   Patient presents with    Chest Pain     Pt to ED c/o midsternal chest pain that radiates to left side x3 days. Pt reports pain became constant last night. PT reports increase in ankle swelling. Pt reports SOB. Pt with extensive cardiac history s/p cardiac stents 12/2024. Pt gets perineoanal dialysis nightly. Pt sts he has not missed dialysis.      Shortness of Breath    Leg Swelling       Case Summary:   59-year-old gentleman with history of end-stage renal disease on peritoneal dialysis, CAD status post CABG and multiple PCI, chronic heart failure with reduced EF, peripheral vascular disease, type 2 diabetes with neuropathy, hypertension who presented with chest pains and leg swelling found to have right toe diabetic ulcer.  He had chest pain which was considered noncardiac following cardiology evaluation.  He has right fifth toe ulcer due to peripheral arterial disease and was consulted by vascular       Active Hospital Problems    Diagnosis Date Noted    Hypervolemia associated with renal insufficiency [E87.70, N28.9] 01/11/2025    Atherosclerosis of native arteries of right leg with ulceration of other part of foot (HCC) [I70.235] 01/10/2025    Chest pain [R07.9] 07/28/2024    Primary hypertension [I10] 01/12/2019         Principal Problem:    Chest pain with chronically elevated troponins: Patient with history of CAD and CABG with multiple PCI's in the past.  Recent left heart cath with SVG-OM1 stent December 2024.  Echocardiogram 10/25/2024 with EF of 40 to 45%.  Patient was seen by cardiology and noted for stable troponins not consistent with ACS.  Will continue Plavix, aspirin, Ranexa, Coreg, Crestor and Imdur.  He remains asymptomatic.  Follow-up echocardiogram today      Diabetic right foot toe ulcer: With peripheral vascular disease.  Patient consulted by vascular    evidence of osteomyelitis.         XR CHEST (2 VW)   Final Result   No acute process.                 Dakotah Villalpando MD      Please excuse brevity and/or typos. This report was transcribed using voice recognition software. Every effort was made to ensure accuracy, however, inadvertent computerized transcription errors may be present.

## 2025-01-14 NOTE — PROGRESS NOTES
Finger stick recheck at 0220 was 254. Feeling tired and ready for sleep.     At 0350 awakened easily for vitals. Denies any pain. Skin is warm and dry. Vitals are stable.     Lab is on the unit at this time collecting daily labs.

## 2025-01-14 NOTE — PROGRESS NOTES
Pancytopenia ([facility]) 04/16/2024    Seizure disorder ([facility]) 11/20/2023    Staphylococcus epidermidis infection 07/06/2023    Septicemia ([facility]) 12/07/2021    Generalized abdominal pain     ESRD on peritoneal dialysis ([facility])     Chronic systolic heart failure ([facility])     Primary hypertension 01/12/2019    HLD (hyperlipidemia)     PVD (peripheral vascular disease) ([facility])         Assessment and Plan:     Chest pain  ~ HS trop 138, 142, 125, 130 which are close to baseline (was up to 469 in July)  ~ symptoms seem more c/w fluid overload. Atypical cp last night. EKG and troponin stable.   ~ limited echo pending     Coronary artery disease   ~ hx CABG '01.  Hx PCI of SVG-OM1 '21 and 4/2024. Restenosis of SVG-OM on [facility] 6/2024, patent LIMA-LAD.  Opted for medical management at that time.   ~ [facility] 7/31/24 IVUS guided POBA + DCB to SVG-OM in stent lesion   ~ NSTEMI s/p PTCA to severe instent stenosis distal SVG > anastomosis OM 12/2/24   ~ on plavix, aspirin, bb, statin, imdur, ranexa     Ischemic cardiomyopathy/ CHF   ~ Echo 10/2024 EF 40-45%  ~ entresto 49/51, coreg 12.5mg BID  ~ fluid overload on admit d/t missed PD  ~ fluid management with PD. Wt in chart unchanged. UF output today noted to be ~1.5L    PAD/ gangrenous toe   ~ planning peripheral angio today per vascular  ~ podiatry following       ESRD  ~ on PD  ~ per nephrology      Carotid artery stenosis- s/p Rt carotid stent 10/2016; s/p Rt carotid POBA to ISR 5/2022  HTN- stable, some elvated    HLD- LDL 97 (7/2024) on statin   T1DM- A1C 8.0  Hx TIA       Addendum: echo resulted. Reviewed with Dr. Leblanc who personally reviewed echo. EF felt to be closer to 40%. Felt to be no change in comparison to last echo in Oct.   Pt with persistent atypical chest discomfort. Trivial pericardial effusion on echo. Will check inflammatory markers. If much higher than baseline consider treating for pericarditis.        Multiple medical conditions with risk of decompensation.   All pertinent  information and plan of care discussed with the physician.  All questions and concerns were addressed to the patient. Alternatives to my treatment were discussed. I have discussed the above stated plan with patient and the nurse. The patient verbalized understanding and agreed with the plan.    Thank you for allowing to us to participate in the care of Te FREITAS SHIRLEYEulogio.    Total visit time > 51 minutes; > 50% spend counseling / coordinating care. I reviewed interval history, physical exam, review of data including labs, imaging, development and implementation of treatment plan and coordination of complex care. Counseled on risk factor modifications.     [facility]   Office: [phone]    NOTE:  This report was transcribed using voice recognition software.  Every effort was made to ensure accuracy; however, inadvertent computerized transcription errors may be present.

## 2025-01-14 NOTE — PLAN OF CARE
Problem: Chronic Conditions and Co-morbidities  Goal: Patient's chronic conditions and co-morbidity symptoms are monitored and maintained or improved  Outcome: Progressing  Flowsheets (Taken 1/14/2025 0854)  Care Plan - Patient's Chronic Conditions and Co-Morbidity Symptoms are Monitored and Maintained or Improved: Monitor and assess patient's chronic conditions and comorbid symptoms for stability, deterioration, or improvement     Problem: Discharge Planning  Goal: Discharge to home or other facility with appropriate resources  Outcome: Progressing  Flowsheets (Taken 1/14/2025 0854)  Discharge to home or other facility with appropriate resources: Identify barriers to discharge with patient and caregiver     Problem: Pain  Goal: Verbalizes/displays adequate comfort level or baseline comfort level  Outcome: Progressing  Flowsheets (Taken 1/14/2025 7130)  Verbalizes/displays adequate comfort level or baseline comfort level: Encourage patient to monitor pain and request assistance     Problem: Safety - Adult  Goal: Free from fall injury  Outcome: Progressing

## 2025-01-14 NOTE — PROGRESS NOTES
..Shift assessment completed. Routine vitals stable. Scheduled medications given. Patient is awake, alert and oriented. Respirations are easy and unlabored. Patient does not appear to be in distress, resting comfortably at this time. Call light within reach.  Patient has procedure later today at 4pm for vascular angiogram and will go to another unit after the procedure, patient aware.

## 2025-01-14 NOTE — PROGRESS NOTES
At 2355 c/o midsternal chest pain rated level 8 and feels like being stabbed. Dilaudid given IVP. Pain improved while assessment was being completed. Able to monitor his own BS per his sensor. Reports his sugar 400. Finger stick result is 356. Shortly after that he had a large emesis. Zofran given IVP. Message sent to hospitalist NP for any further instruction. Received Humalog 12U and Lantus 12U almost exactly 4 hrs earlier.

## 2025-01-14 NOTE — PROGRESS NOTES
Vascular Progress Note    1/14/2025 9:28 AM    Chief complaint / Reason for visit : PAD, right 5th toe ulcer    Subjective:  Patient resting in bed.  He reports he is doing okay.  No new complaints.  VSS, afebrile.     Vital Signs: BP (!) 151/72   Pulse 87   Temp 97.6 °F (36.4 °C) (Oral)   Resp 16   Ht 1.727 m (5' 8\")   Wt 90.7 kg (199 lb 15.3 oz)   SpO2 99%   BMI 30.40 kg/m²  O2 Flow Rate (L/min): 0 L/min   I/O:    Intake/Output Summary (Last 24 hours) at 1/14/2025 0928  Last data filed at 1/14/2025 0857  Gross per 24 hour   Intake 360 ml   Output 0 ml   Net 360 ml       Physical Exam:   General: no apparent distress, appears stated age  Chest/Lungs:  no accessory muscle use  Cardiac:  regular rate and rhythm  Abdomen:  abdomen is soft without significant tenderness,+ PD catheter   Vascular: non palpable pedal pulses  Extremities: warm, no signs of cyanosis or ischemia, no significant edema, bilateral upper and lower extremity motorsensory intact.  Right 5th toe ulcer      Labs:   Lab Results   Component Value Date/Time     01/14/2025 04:50 AM    K 4.1 01/14/2025 04:50 AM    K 3.9 11/14/2024 06:09 AM    CL 98 01/14/2025 04:50 AM    CO2 29 01/14/2025 04:50 AM    BUN 36 01/14/2025 04:50 AM    CREATININE 6.0 01/14/2025 04:50 AM    GFRAA 9 10/03/2022 04:45 AM    GFRAA 44 06/15/2013 01:00 AM    LABGLOM 10 01/14/2025 04:50 AM    LABGLOM 6 04/30/2024 04:51 AM    GLUCOSE 190 01/14/2025 04:50 AM    PHOS 3.1 01/14/2025 04:50 AM    MG 2.13 01/14/2025 04:50 AM    CALCIUM 9.2 01/14/2025 04:50 AM     Lab Results   Component Value Date/Time    WBC 4.0 01/14/2025 04:50 AM    RBC 3.81 01/14/2025 04:50 AM    HGB 11.1 01/14/2025 04:50 AM    HCT 33.4 01/14/2025 04:50 AM    MCV 87.7 01/14/2025 04:50 AM    RDW 17.4 01/14/2025 04:50 AM     01/14/2025 04:50 AM     Lab Results   Component Value Date    INR 0.96 01/10/2025    PROTIME 13.0 01/10/2025        Imaging:    BLE arterial duplex 1/13/25:  Interpretation

## 2025-01-14 NOTE — DISCHARGE INSTRUCTIONS
Podiatry Discharge Instructions:  -Please continue to paint betadine to the right 5th toe. No dressing is necessary.  -Please call the clinic and schedule an appointment as soon as possible for continued management of the right 5th toe    -Please watch for signs and symptoms of infection including but not limited to fever, chills, feeling ill, redness around the wounds, redness streaking up the legs, purulent drainage. Instructed patient to call the clinic or present to the nearest emergency department if they notice these signs or symptoms       PERIPHERAL ANGIOGRAM    Care of your puncture site:  Remove bandage 24 hours after the procedure.  May shower in 24 hours but do not sit in a bathtub/pool of water for 5 days or until the wound is healed.  Inspect the site daily and gently clean using soap and water while standing in the shower.  Dry thoroughly and apply a Band-Aid that covers the entire site. Do not apply powder or lotion.    Normal Observations:  Soreness or tenderness which may last one week.  Mild oozing from the incision site.  Possible bruising that could last 2 weeks.    Activity:  You may resume driving 24 hours following the procedure.  You may resume normal activity in 5 days or after the wound heals.  Avoid lifting more than 10 pounds for 5 days or until the wound heals.  Avoid strenuous exercise or activity for 1 week.    Nutrition:  Regular diet   Drink at least 8 to 10 glasses of decaffeinated, non-alcoholic fluid for the next 24 hours to flush the x-ray dye used for your angiogram out of your body.    Call your doctor immediately if your condition worsens, for any other concerns, for a follow-up appointment or if you experience any of the following:  Significant bleeding that does not stop after 10 minutes of applying firm pressure on the puncture site.  Increased swelling on the groin or leg.  Unusual pain, numbness, or tingling of the groin or down the leg.  Any signs of infection such as:  redness, yellow drainage at the site, swelling or pain

## 2025-01-14 NOTE — DIALYSIS
Treatment initiated without complications or complaints from patient. Patient resting comfortably with VSS upon dialysis RN exit from room. Nya Johnson RN notified of start of treatment and hotline number located on top of machine for any questions about troubleshooting during after hours.

## 2025-01-14 NOTE — PROGRESS NOTES
Treatment time: 12 Hours 58 minutes  Net UF: 1560 ml  Dwell Time: 42 minutes gained    Treatment completed without complications or complaints from patient. Effluent clear and lines taped to patient per protocol. Patient resting comfortably with VSS upon exiting room.      Copy of dialysis treatment record placed in chart, to be scanned into EMR and report given to Brigid Villalobos RN.

## 2025-01-14 NOTE — PROGRESS NOTES
Patient had this nurse check his BS which was 319. This morning he was 244 with 7 units given of Humalog. Patient is requesting more insulin, secure message sent to Dr Villalpando, awaiting response.   Dr Villalpando declined giving any insulin at this time.

## 2025-01-14 NOTE — DISCHARGE INSTR - COC
Continuity of Care Form    Patient Name: Te Garay   :  1965  MRN:  [id number]    Admit date:  1/10/2025  Discharge date:  25    Code Status Order: Full Code   Advance Directives:   Advance Care Flowsheet Documentation             Admitting Physician:  Jayla Rubio MD  PCP: Reid Lei MD    Discharging Nurse: Alix SANCHEZ  Discharging Hospital Unit/Room#: 3TN-3369/3369-01  Discharging Unit Phone Number: [phone]    Emergency Contact:   Extended Emergency Contact Information  Primary Emergency Contact: Sachi Garay  Address: [address] [place] [address]  Home Phone: [phone]  Mobile Phone: [phone]  Relation: Spouse  Secondary Emergency Contact: Kendell Rodriguez  Home Phone: [phone]  Relation: Child    Past Surgical History:  Past Surgical History:   Procedure Laterality Date    ANKLE SURGERY Left 2024    REPAIR OF BREVIS TENDON-LEFT FOOT; COMPLEX WOUND CLOSURE-LEFT FOOT; APPLICATION BELOW KNEE SPLINT-LEFT LOWER LIMB performed by David Lowe DPM at [facility] OR    BLADDER SURGERY Left 2023    CYSTOSCOPY, LEFT URETEROSCOPY, STONE BASKET MANIPULATION, PLACEMENT OF LEFT URETERAL STENT performed by Chevy Sepulveda MD at [facility] OR    CARDIAC CATHETERIZATION      CARDIAC PROCEDURE N/A 2024    Left and right heart w coronary bypass graft performed by David Parish MD at [facility] CARDIAC CATH LAB    CARDIAC PROCEDURE N/A 2024    Left heart cath / coronary angiography with PCI performed by Larry Marin MD at [facility] CARDIAC CATH LAB    CARDIAC PROCEDURE N/A 2024    Insert stent juan coronary bypass graft performed by Larry Marin MD at [facility] CARDIAC CATH LAB    CARDIAC PROCEDURE N/A 2024    Intravascular ultrasound performed by Larry Marin MD at [facility] CARDIAC CATH LAB    CAROTID STENT PLACEMENT Right     CORONARY ARTERY BYPASS GRAFT      FOOT DEBRIDEMENT Left 10/16/2023    LEFT FOOT  days: 50        Elimination:  Continence:   Bowel: Yes  Bladder: Yes  Urinary Catheter: None   Colostomy/Ileostomy/Ileal Conduit: No       Date of Last BM: 1/15    Intake/Output Summary (Last 24 hours) at 1/14/2025 0910  Last data filed at 1/14/2025 0857  Gross per 24 hour   Intake 360 ml   Output 0 ml   Net 360 ml     I/O last 3 completed shifts:  In: 240 [P.O.:240]  Out: 0     Safety Concerns:     None    Impairments/Disabilities:      None    Nutrition Therapy:  Current Nutrition Therapy:   - Oral Diet:  General    Routes of Feeding: Oral  Liquids: No Restrictions  Daily Fluid Restriction: no  Last Modified Barium Swallow with Video (Video Swallowing Test): not done    Treatments at the Time of Hospital Discharge:   Respiratory Treatments: N/A  Oxygen Therapy:  is not on home oxygen therapy.  Ventilator:    - No ventilator support    Rehab Therapies: N/A  Weight Bearing Status/Restrictions: No weight bearing restrictions  Other Medical Equipment (for information only, NOT a DME order):  N/A  Other Treatments: N/A    Patient's personal belongings (please select all that are sent with patient):  None    RN SIGNATURE:  Electronically signed by Alix Tobisa RN on 1/16/25 at 1:45 PM EST    CASE MANAGEMENT/SOCIAL WORK SECTION    Inpatient Status Date: ***    Readmission Risk Assessment Score:  [facility] RISK OF UNPLANNED READMISSION 2.0             32.8 Total Score        Discharging to Facility/ Agency   Name: [facility]  [address] [place] [address] [address],   [place] [address]  Phone: [phone]  Fax: [phone]       Dialysis Facility (if applicable) -   Home Peritoneal  Dialysis  Name:[facility] [address] , [place] [address]  Phone:  [phone]  Fax: [phone]          / signature: Electronically signed by OCHOA MENG RN /BSN/CCM   on 1/16/25 at 1:31 PM EST      PHYSICIAN SECTION    Prognosis: Fair    Condition at Discharge: Stable    Rehab

## 2025-01-14 NOTE — PROGRESS NOTES
[facility]   Nephrology progress Note  [phone]  [phone]   [email or web address]           SUMMARY :  We are following this patient for ESRD on PD    59 y.o. male with PMH significant for  ESRD on PD, CAD/CABG/CHF, carotid artery stenosis, DM 2, diabetic neuropathy, hypertension, hyperlipidemia, seizure disorder came in with complaints of chest pain and shortness of breath, also reports lower extremity edema.   Chest x-ray with no acute process  He also had a x-ray of the right foot that showed soft tissue swelling and ulceration of the fifth digit with no radiographic evidence of osteomyelitis  Of note, cardiology was consulted for elevated troponins, no plans for any intervention at this time  Recently treated for PD related peritonitis, cultures were positive for Staph epidermidis, treated with vancomycin, completed vancomycin on 10/25/2024. Readmitted with peritonitis again in 2024, cultures again positive for staph epidermidis, discharged on intraperitoneal vancomycin     SUBJECTIVE:   Patient progress reviewed.  25:  The patient denies any shortness of breath or chest pain.  Peritoneal dialysis ongoing normally  25: Plan for Rt leg angiogram and possible intervention this evening     Physical Exam:    VITALS:  BP (!) 151/72   Pulse 87   Temp 97.6 °F (36.4 °C) (Oral)   Resp 16   Ht 1.727 m (5' 8\")   Wt 90.7 kg (199 lb 15.3 oz)   SpO2 99%   BMI 30.40 kg/m²   BLOOD PRESSURE RANGE:  Systolic (24hrs), Av , Min:126 , Max:156   ; Diastolic (24hrs), Av, Min:63, Max:85    24HR INTAKE/OUTPUT:    Intake/Output Summary (Last 24 hours) at 2025 1027  Last data filed at 2025 0857  Gross per 24 hour   Intake 360 ml   Output 0 ml   Net 360 ml       Gen:  alert, oriented x 3  PERRL , EOM +  Pallor +, No icterus  JVP not raised   CV: RRR no murmur or rub .  Midsternal scar of CABG  Lungs:B/ L air entry, Normal breath sounds   Abd: soft, bowel sounds + , No  organomegaly , PD catheter present  Ext: Leg edema present, no cyanosis, Rt % th toe ulceration   Skin: Warm.  No rash  Neuro: nonfocal.      DATA:    CBC with Differential:    Lab Results   Component Value Date/Time    WBC 4.0 01/14/2025 04:50 AM    RBC 3.81 01/14/2025 04:50 AM    HGB 11.1 01/14/2025 04:50 AM    HCT 33.4 01/14/2025 04:50 AM     01/14/2025 04:50 AM    MCV 87.7 01/14/2025 04:50 AM    MCH 29.1 01/14/2025 04:50 AM    MCHC 33.2 01/14/2025 04:50 AM    RDW 17.4 01/14/2025 04:50 AM    LYMPHOPCT 24.4 01/14/2025 04:50 AM    MONOPCT 15.1 01/14/2025 04:50 AM    EOSPCT 1.9 01/14/2025 04:50 AM    BASOPCT 0.4 01/14/2025 04:50 AM    MONOSABS 0.6 01/14/2025 04:50 AM    LYMPHSABS 1.0 01/14/2025 04:50 AM    EOSABS 0.1 01/14/2025 04:50 AM    BASOSABS 0.0 01/14/2025 04:50 AM    DIFFTYPE Scan-K 06/15/2013 01:00 AM     CMP:    Lab Results   Component Value Date/Time     01/14/2025 04:50 AM    K 4.1 01/14/2025 04:50 AM    K 3.9 11/14/2024 06:09 AM    CL 98 01/14/2025 04:50 AM    CO2 29 01/14/2025 04:50 AM    BUN 36 01/14/2025 04:50 AM    CREATININE 6.0 01/14/2025 04:50 AM    GFRAA 9 10/03/2022 04:45 AM    GFRAA 44 06/15/2013 01:00 AM    AGRATIO 1.3 01/14/2025 04:50 AM    LABGLOM 10 01/14/2025 04:50 AM    LABGLOM 6 04/30/2024 04:51 AM    GLUCOSE 190 01/14/2025 04:50 AM    CALCIUM 9.2 01/14/2025 04:50 AM    BILITOT <0.2 01/14/2025 04:50 AM    ALKPHOS 118 01/14/2025 04:50 AM    AST 16 01/14/2025 04:50 AM    ALT 9 01/14/2025 04:50 AM     Phosphorus:    Lab Results   Component Value Date/Time    PHOS 3.1 01/14/2025 04:50 AM     Uric Acid:    Lab Results   Component Value Date/Time    URICACID 6.4 05/01/2020 06:44 AM     Last 3 Troponin:    Lab Results   Component Value Date/Time    TROPONINI 0.10 10/01/2022 02:20 PM    TROPONINI 0.05 10/01/2022 03:56 AM    TROPONINI 0.06 05/20/2022 05:18 PM     Lab Results   Component Value Date/Time    MG 2.13 01/14/2025 04:50 AM           IMPRESSION/RECOMMENDATIONS:

## 2025-01-14 NOTE — PROGRESS NOTES
Podiatric Surgery Daily Progress Note  Te Garay      Subjective :   Patient seen and examined this am at the bedside. Patient denies any acute overnight events. Patient understands that he will likely require surgery as an outpatient and is in agreement. Patient denies N/V/F/C/SOB. Patient denies calf pain, thigh pain, chest pain.     Review of Systems: A 12 point review of symptoms is unremarkable with the exception of the chief complaint. Patient specifically denies nausea, fever, vomiting, chills, shortness of breath, chest pain, abdominal pain, constipation or difficulty urinating.       Objective     BP (!) 151/72   Pulse 87   Temp 97.6 °F (36.4 °C) (Oral)   Resp 16   Ht 1.727 m (5' 8\")   Wt 90.3 kg (199 lb)   SpO2 99%   BMI 30.26 kg/m²      I/O:  Intake/Output Summary (Last 24 hours) at 1/14/2025 1340  Last data filed at 1/14/2025 0857  Gross per 24 hour   Intake 360 ml   Output 0 ml   Net 360 ml              Wt Readings from Last 3 Encounters:   01/14/25 90.3 kg (199 lb)   12/10/24 90.3 kg (199 lb)   11/20/24 85.3 kg (188 lb)       LABS:    Recent Labs     01/13/25  0448 01/14/25  0450   WBC 3.4* 4.0   HGB 11.1* 11.1*   HCT 34.3* 33.4*    197        Recent Labs     01/14/25  0450      K 4.1   CL 98*   CO2 29   PHOS 3.1   BUN 36*   CREATININE 6.0*      No results for input(s): \"INR\", \"APTT\" in the last 72 hours.    Invalid input(s): \"PROT\"        LOWER EXTREMITY EXAMINATION    No dressing to b/l LE necessary.    VASCULAR: DP and PT pulses are weakly palpable 1/4 b/l. CFT is brisk to the digits of the foot b/l with exception of the right fifth toe which is absent. Skin temperature is warm to cool from proximal to distal with no focal calor noted.  No edema noted. No pain with calf compression b/l.     NEUROLOGIC: Gross and epicritic sensation is diminished b/l. Protective sensation is intact at all pedal sites b/l.     DERMATOLOGIC:      Right lower extremity:  Fifth digit displaying  signs of dry gangrene to the distal 50% of the digit.  No open wounds noted.  There is mild amounts of interdigital maceration.  No malodor.     Left lower extremity:  Well-healed cicatrix along the lateral aspect of the midfoot consistent with recent surgery.  No open wounds noted.  Otherwise, closed soft tissue envelope is appreciated without acute signs of infection.     MUSCULOSKELETAL: Muscle strength is 5/5 for all pedal groups tested.  Pain with palpation and manual manipulation of the right fifth toe. Ankle joint ROM is decreased in dorsiflexion with the knee extended. No obvious biomechanical abnormalities.      IMAGING:  Arterial duplex, bilateral lower extremities (1/13/25)    Right side findings: Resting CATHI is non-compressible (CATHI >1.4). Arterial disease noted at the level of the tibial artery.    Left side findings: Resting CATHI is non-compressible (CATHI >1.4). Arterial disease noted at the level of the tibial artery.    Right Middle Anterior Tibial Artery: Occluded.    Right Middle Posterior Tibial Artery: >50% stenosis.    Left Middle Anterior Tibial Artery: >50% stenosis.    X-ray, right toe (1/10/2025)  IMPRESSION:  Soft tissue swelling and ulceration of the 5th digit with no radiographic  evidence of osteomyelitis.        ASSESSMENT/PLAN:   Dry gangrene, right fifth toe  2.  Diabetes mellitus type 1 not complicated by peripheral neuropathy  3.  S/p left fifth metatarsal ostectomy with repair of the peroneus brevis tendon (DOS 11/25/2024)     -Patient seen and examined at bedside this a.m.  -Hypertensive otherwise VSS, leukopenia noted (WBC 4.0)  -HbA1c 8.2 (11/28/2024)  -Images reviewed, impression noted above  -Arterial duplex with noon-compressible vessels   -Vascular surgery recommends follow up outpatient   -Wound culture not obtained at this time 2/2 no active drainage.  -No antibiotics warranted from a podiatric standpoint 2/2 no acute signs of infection  -Right fifth toe pain with Betadine

## 2025-01-15 LAB
ALBUMIN SERPL-MCNC: 3.4 G/DL (ref 3.4–5)
ALBUMIN/GLOB SERPL: 1.2 {RATIO} (ref 1.1–2.2)
ALP SERPL-CCNC: 133 U/L (ref 40–129)
ALT SERPL-CCNC: 9 U/L (ref 10–40)
ANION GAP SERPL CALCULATED.3IONS-SCNC: 13 MMOL/L (ref 3–16)
ANION GAP SERPL CALCULATED.3IONS-SCNC: 13 MMOL/L (ref 3–16)
ANION GAP SERPL CALCULATED.3IONS-SCNC: 14 MMOL/L (ref 3–16)
APPEARANCE FLUID: CLEAR
AST SERPL-CCNC: 18 U/L (ref 15–37)
BASE EXCESS BLDV CALC-SCNC: 3.1 MMOL/L (ref -3–3)
BASOPHILS # BLD: 0 K/UL (ref 0–0.2)
BASOPHILS NFR BLD: 0.1 %
BDY FLUID QUALITY: NORMAL
BILIRUB SERPL-MCNC: 0.3 MG/DL (ref 0–1)
BUN SERPL-MCNC: 44 MG/DL (ref 7–20)
BUN SERPL-MCNC: 44 MG/DL (ref 7–20)
BUN SERPL-MCNC: 45 MG/DL (ref 7–20)
CALCIUM SERPL-MCNC: 9 MG/DL (ref 8.3–10.6)
CELL COUNT FLUID TYPE: NORMAL
CHLORIDE SERPL-SCNC: 90 MMOL/L (ref 99–110)
CHLORIDE SERPL-SCNC: 90 MMOL/L (ref 99–110)
CHLORIDE SERPL-SCNC: 95 MMOL/L (ref 99–110)
CO2 BLDV-SCNC: 61 MMOL/L
CO2 SERPL-SCNC: 24 MMOL/L (ref 21–32)
CO2 SERPL-SCNC: 24 MMOL/L (ref 21–32)
CO2 SERPL-SCNC: 25 MMOL/L (ref 21–32)
COHGB MFR BLDV: 2.4 % (ref 0–1.5)
COLOR FLUID: COLORLESS
CREAT SERPL-MCNC: 6.4 MG/DL (ref 0.9–1.3)
CREAT SERPL-MCNC: 6.5 MG/DL (ref 0.9–1.3)
CREAT SERPL-MCNC: 7.1 MG/DL (ref 0.9–1.3)
DEPRECATED RDW RBC AUTO: 17.3 % (ref 12.4–15.4)
DIFF PNL FLD: NORMAL
EOSINOPHIL # BLD: 0 K/UL (ref 0–0.6)
EOSINOPHIL NFR BLD: 0 %
GFR SERPLBLD CREATININE-BSD FMLA CKD-EPI: 8 ML/MIN/{1.73_M2}
GFR SERPLBLD CREATININE-BSD FMLA CKD-EPI: 9 ML/MIN/{1.73_M2}
GFR SERPLBLD CREATININE-BSD FMLA CKD-EPI: 9 ML/MIN/{1.73_M2}
GLUCOSE BLD-MCNC: 136 MG/DL (ref 70–99)
GLUCOSE BLD-MCNC: 176 MG/DL (ref 70–99)
GLUCOSE BLD-MCNC: 292 MG/DL (ref 70–99)
GLUCOSE BLD-MCNC: 338 MG/DL (ref 70–99)
GLUCOSE BLD-MCNC: 365 MG/DL (ref 70–99)
GLUCOSE BLD-MCNC: 376 MG/DL (ref 70–99)
GLUCOSE BLD-MCNC: 413 MG/DL (ref 70–99)
GLUCOSE BLD-MCNC: 449 MG/DL (ref 70–99)
GLUCOSE BLD-MCNC: 533 MG/DL (ref 70–99)
GLUCOSE BLD-MCNC: 580 MG/DL (ref 70–99)
GLUCOSE BLD-MCNC: >600 MG/DL (ref 70–99)
GLUCOSE SERPL-MCNC: 257 MG/DL (ref 70–99)
GLUCOSE SERPL-MCNC: 673 MG/DL (ref 70–99)
GLUCOSE SERPL-MCNC: 681 MG/DL (ref 70–99)
GLUCOSE SERPL-MCNC: 709 MG/DL (ref 70–99)
HCO3 BLDV-SCNC: 26.1 MMOL/L (ref 23–29)
HCT VFR BLD AUTO: 36.9 % (ref 40.5–52.5)
HGB BLD-MCNC: 11.9 G/DL (ref 13.5–17.5)
LYMPHOCYTES # BLD: 0.4 K/UL (ref 1–5.1)
LYMPHOCYTES NFR BLD: 7.3 %
MAGNESIUM SERPL-MCNC: 2.06 MG/DL (ref 1.8–2.4)
MCH RBC QN AUTO: 29.1 PG (ref 26–34)
MCHC RBC AUTO-ENTMCNC: 32.3 G/DL (ref 31–36)
MCV RBC AUTO: 89.9 FL (ref 80–100)
METHGB MFR BLDV: 0.3 %
MONOCYTES # BLD: 0.1 K/UL (ref 0–1.3)
MONOCYTES NFR BLD: 2 %
NEUTROPHILS # BLD: 4.9 K/UL (ref 1.7–7.7)
NEUTROPHILS NFR BLD: 90.6 %
NUC CELL # FLD: 1 /CUMM
O2 CT VFR BLDV CALC: 17 VOL %
O2 THERAPY: ABNORMAL
PCO2 BLDV: 33.6 MMHG (ref 40–50)
PERFORMED ON: ABNORMAL
PH BLDV: 7.5 [PH] (ref 7.35–7.45)
PLATELET # BLD AUTO: 194 K/UL (ref 135–450)
PMV BLD AUTO: 9.1 FL (ref 5–10.5)
PO2 BLDV: 189 MMHG (ref 25–40)
POTASSIUM SERPL-SCNC: 4.5 MMOL/L (ref 3.5–5.1)
POTASSIUM SERPL-SCNC: 5.8 MMOL/L (ref 3.5–5.1)
POTASSIUM SERPL-SCNC: 5.8 MMOL/L (ref 3.5–5.1)
PROT SERPL-MCNC: 6.3 G/DL (ref 6.4–8.2)
RBC # BLD AUTO: 4.11 M/UL (ref 4.2–5.9)
RBC FLUID: 4 /CUMM
SAO2 % BLDV: 100 %
SODIUM SERPL-SCNC: 127 MMOL/L (ref 136–145)
SODIUM SERPL-SCNC: 128 MMOL/L (ref 136–145)
SODIUM SERPL-SCNC: 133 MMOL/L (ref 136–145)
WBC # BLD AUTO: 5.4 K/UL (ref 4–11)

## 2025-01-15 PROCEDURE — 80053 COMPREHEN METABOLIC PANEL: CPT

## 2025-01-15 PROCEDURE — 90945 DIALYSIS ONE EVALUATION: CPT

## 2025-01-15 PROCEDURE — 36415 COLL VENOUS BLD VENIPUNCTURE: CPT

## 2025-01-15 PROCEDURE — 6360000002 HC RX W HCPCS: Performed by: SURGERY

## 2025-01-15 PROCEDURE — 2500000003 HC RX 250 WO HCPCS: Performed by: SURGERY

## 2025-01-15 PROCEDURE — 85025 COMPLETE CBC W/AUTO DIFF WBC: CPT

## 2025-01-15 PROCEDURE — 6370000000 HC RX 637 (ALT 250 FOR IP)

## 2025-01-15 PROCEDURE — 6370000000 HC RX 637 (ALT 250 FOR IP): Performed by: SURGERY

## 2025-01-15 PROCEDURE — 82803 BLOOD GASES ANY COMBINATION: CPT

## 2025-01-15 PROCEDURE — 83735 ASSAY OF MAGNESIUM: CPT

## 2025-01-15 PROCEDURE — 99232 SBSQ HOSP IP/OBS MODERATE 35: CPT | Performed by: INTERNAL MEDICINE

## 2025-01-15 PROCEDURE — 1200000000 HC SEMI PRIVATE

## 2025-01-15 PROCEDURE — 82947 ASSAY GLUCOSE BLOOD QUANT: CPT

## 2025-01-15 PROCEDURE — APPSS30 APP SPLIT SHARED TIME 16-30 MINUTES: Performed by: NURSE PRACTITIONER

## 2025-01-15 PROCEDURE — 89050 BODY FLUID CELL COUNT: CPT

## 2025-01-15 PROCEDURE — 6370000000 HC RX 637 (ALT 250 FOR IP): Performed by: INTERNAL MEDICINE

## 2025-01-15 PROCEDURE — APPNB30 APP NON BILLABLE TIME 0-30 MINS: Performed by: NURSE PRACTITIONER

## 2025-01-15 RX ORDER — INSULIN LISPRO 100 [IU]/ML
INJECTION, SOLUTION INTRAVENOUS; SUBCUTANEOUS
Status: COMPLETED
Start: 2025-01-15 | End: 2025-01-15

## 2025-01-15 RX ORDER — INSULIN LISPRO 100 [IU]/ML
10 INJECTION, SOLUTION INTRAVENOUS; SUBCUTANEOUS ONCE
Status: COMPLETED | OUTPATIENT
Start: 2025-01-15 | End: 2025-01-15

## 2025-01-15 RX ORDER — 0.9 % SODIUM CHLORIDE 0.9 %
500 INTRAVENOUS SOLUTION INTRAVENOUS ONCE
Status: DISCONTINUED | OUTPATIENT
Start: 2025-01-15 | End: 2025-01-15

## 2025-01-15 RX ORDER — INSULIN LISPRO 100 [IU]/ML
20 INJECTION, SOLUTION INTRAVENOUS; SUBCUTANEOUS ONCE
Status: DISCONTINUED | OUTPATIENT
Start: 2025-01-15 | End: 2025-01-15

## 2025-01-15 RX ADMIN — LEVETIRACETAM 500 MG: 500 TABLET, FILM COATED ORAL at 08:22

## 2025-01-15 RX ADMIN — ONDANSETRON HYDROCHLORIDE 4 MG: 2 SOLUTION INTRAMUSCULAR; INTRAVENOUS at 20:09

## 2025-01-15 RX ADMIN — INSULIN LISPRO 5 UNITS: 100 INJECTION, SOLUTION INTRAVENOUS; SUBCUTANEOUS at 18:04

## 2025-01-15 RX ADMIN — RANOLAZINE 500 MG: 500 TABLET, EXTENDED RELEASE ORAL at 20:08

## 2025-01-15 RX ADMIN — HYDROMORPHONE HYDROCHLORIDE 0.5 MG: 1 INJECTION, SOLUTION INTRAMUSCULAR; INTRAVENOUS; SUBCUTANEOUS at 00:44

## 2025-01-15 RX ADMIN — SACUBITRIL AND VALSARTAN 1 TABLET: 49; 51 TABLET, FILM COATED ORAL at 20:08

## 2025-01-15 RX ADMIN — FUROSEMIDE 80 MG: 40 TABLET ORAL at 08:18

## 2025-01-15 RX ADMIN — INSULIN HUMAN 10 UNITS: 100 INJECTION, SOLUTION PARENTERAL at 10:04

## 2025-01-15 RX ADMIN — ONDANSETRON HYDROCHLORIDE 4 MG: 2 SOLUTION INTRAMUSCULAR; INTRAVENOUS at 00:44

## 2025-01-15 RX ADMIN — INSULIN LISPRO 16 UNITS: 100 INJECTION, SOLUTION INTRAVENOUS; SUBCUTANEOUS at 11:31

## 2025-01-15 RX ADMIN — ASPIRIN 81 MG: 81 TABLET, COATED ORAL at 08:18

## 2025-01-15 RX ADMIN — INSULIN LISPRO 5 UNITS: 100 INJECTION, SOLUTION INTRAVENOUS; SUBCUTANEOUS at 08:25

## 2025-01-15 RX ADMIN — SEVELAMER CARBONATE 800 MG: 800 TABLET, FILM COATED ORAL at 08:19

## 2025-01-15 RX ADMIN — ROSUVASTATIN CALCIUM 10 MG: 10 TABLET, FILM COATED ORAL at 20:08

## 2025-01-15 RX ADMIN — INSULIN GLARGINE 18 UNITS: 100 INJECTION, SOLUTION SUBCUTANEOUS at 20:09

## 2025-01-15 RX ADMIN — HYDROMORPHONE HYDROCHLORIDE 0.5 MG: 1 INJECTION, SOLUTION INTRAMUSCULAR; INTRAVENOUS; SUBCUTANEOUS at 04:15

## 2025-01-15 RX ADMIN — INSULIN LISPRO 16 UNITS: 100 INJECTION, SOLUTION INTRAVENOUS; SUBCUTANEOUS at 06:40

## 2025-01-15 RX ADMIN — INSULIN LISPRO 5 UNITS: 100 INJECTION, SOLUTION INTRAVENOUS; SUBCUTANEOUS at 11:32

## 2025-01-15 RX ADMIN — SEVELAMER CARBONATE 800 MG: 800 TABLET, FILM COATED ORAL at 11:31

## 2025-01-15 RX ADMIN — Medication 10 ML: at 08:23

## 2025-01-15 RX ADMIN — SEVELAMER CARBONATE 800 MG: 800 TABLET, FILM COATED ORAL at 16:23

## 2025-01-15 RX ADMIN — LEVETIRACETAM 500 MG: 500 TABLET, FILM COATED ORAL at 08:19

## 2025-01-15 RX ADMIN — CALCITRIOL CAPSULES 0.25 MCG 0.25 MCG: 0.25 CAPSULE ORAL at 08:18

## 2025-01-15 RX ADMIN — SERTRALINE HYDROCHLORIDE 50 MG: 50 TABLET ORAL at 20:08

## 2025-01-15 RX ADMIN — CLOPIDOGREL BISULFATE 75 MG: 75 TABLET ORAL at 08:19

## 2025-01-15 RX ADMIN — HYDROMORPHONE HYDROCHLORIDE 0.5 MG: 1 INJECTION, SOLUTION INTRAMUSCULAR; INTRAVENOUS; SUBCUTANEOUS at 20:08

## 2025-01-15 RX ADMIN — CARVEDILOL 12.5 MG: 6.25 TABLET, FILM COATED ORAL at 16:22

## 2025-01-15 RX ADMIN — ISOSORBIDE MONONITRATE 30 MG: 30 TABLET, EXTENDED RELEASE ORAL at 20:08

## 2025-01-15 RX ADMIN — RANOLAZINE 500 MG: 500 TABLET, EXTENDED RELEASE ORAL at 08:18

## 2025-01-15 RX ADMIN — INSULIN LISPRO 10 UNITS: 100 INJECTION, SOLUTION INTRAVENOUS; SUBCUTANEOUS at 05:04

## 2025-01-15 RX ADMIN — HYDROMORPHONE HYDROCHLORIDE 0.5 MG: 1 INJECTION, SOLUTION INTRAMUSCULAR; INTRAVENOUS; SUBCUTANEOUS at 08:13

## 2025-01-15 RX ADMIN — SODIUM CHLORIDE, PRESERVATIVE FREE 10 ML: 5 INJECTION INTRAVENOUS at 08:23

## 2025-01-15 RX ADMIN — SACUBITRIL AND VALSARTAN 1 TABLET: 49; 51 TABLET, FILM COATED ORAL at 08:19

## 2025-01-15 RX ADMIN — CARVEDILOL 12.5 MG: 6.25 TABLET, FILM COATED ORAL at 08:18

## 2025-01-15 RX ADMIN — ONDANSETRON HYDROCHLORIDE 4 MG: 2 SOLUTION INTRAMUSCULAR; INTRAVENOUS at 08:13

## 2025-01-15 ASSESSMENT — PAIN DESCRIPTION - DESCRIPTORS
DESCRIPTORS: DISCOMFORT
DESCRIPTORS: ACHING
DESCRIPTORS: STABBING
DESCRIPTORS: ACHING
DESCRIPTORS: DISCOMFORT

## 2025-01-15 ASSESSMENT — PAIN DESCRIPTION - ORIENTATION
ORIENTATION: MID;LEFT
ORIENTATION: MID
ORIENTATION: MID
ORIENTATION: MID;LEFT

## 2025-01-15 ASSESSMENT — PAIN DESCRIPTION - LOCATION
LOCATION: CHEST
LOCATION: BACK;CHEST
LOCATION: CHEST
LOCATION: BACK;CHEST
LOCATION: CHEST
LOCATION: CHEST

## 2025-01-15 ASSESSMENT — PAIN SCALES - GENERAL
PAINLEVEL_OUTOF10: 4
PAINLEVEL_OUTOF10: 5
PAINLEVEL_OUTOF10: 7
PAINLEVEL_OUTOF10: 3
PAINLEVEL_OUTOF10: 0
PAINLEVEL_OUTOF10: 3
PAINLEVEL_OUTOF10: 7
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
## 2025-01-15 NOTE — PLAN OF CARE
Problem: Pain  Goal: Verbalizes/displays adequate comfort level or baseline comfort level  Outcome: Progressing     Problem: Metabolic/Fluid and Electrolytes - Adult  Goal: Electrolytes maintained within normal limits  Outcome: Progressing  Goal: Hemodynamic stability and optimal renal function maintained  Outcome: Progressing  Goal: Glucose maintained within prescribed range  Outcome: Progressing

## 2025-01-15 NOTE — PROGRESS NOTES
0746: Lab called this nurse to report a blood glucose of 709.Provider notified of blood glucose of 709. No further actions at this moment.  0747: doctor to bedside.

## 2025-01-15 NOTE — CARE COORDINATION
Case Management Assessment  Initial Evaluation    Date/Time of Evaluation: 1/15/2025 2:49 PM  Assessment Completed by: ROBIN Miller, TEJAW    If patient is discharged prior to next notation, then this note serves as note for discharge by case management.    Patient Name: Te Garay                   YOB: 1965  Diagnosis: Chest pain [R07.9]  ESRD (end stage renal disease) (HCC) [N18.6]  Necrotic toes (HCC) [I96]  Chest pain, unspecified type [R07.9]  Hypervolemia associated with renal insufficiency [E87.70, N28.9]                   Date / Time: 1/10/2025  8:02 AM    Patient Admission Status: Inpatient   Readmission Risk (Low < 19, Mod (19-27), High > 27): Readmission Risk Score: 31    Current PCP: Reid eLi MD  PCP verified by CM? Yes    Chart Reviewed: Yes      History Provided by: Patient  Patient Orientation: Alert and Oriented    Patient Cognition: Alert    Hospitalization in the last 30 days (Readmission):  No    If yes, Readmission Assessment in CM Navigator will be completed.    Advance Directives:      Code Status: Full Code   Patient's Primary Decision Maker is:        Discharge Planning:    Patient lives with: Spouse/Significant Other, Children Type of Home: House (1 level - 2 steps)  Primary Care Giver: Self  Patient Support Systems include: Children, Spouse/Significant Other   Current Financial resources: Medicare  Current community resources: None  Current services prior to admission: Durable Medical Equipment, Home Care (active w/[facility])            Current DME: Other (Comment) (walker, cane, wheelhair, knee scooter)            Type of Home Care services:  Nursing Services    ADLS  Prior functional level: Independent in ADLs/IADLs  Current functional level: Independent in ADLs/IADLs    PT AM-PAC: 24 /24  OT AM-PAC: 24 /24    Family can provide assistance at DC: Yes  Would you like Case Management to discuss the discharge plan with any other family members/significant

## 2025-01-15 NOTE — PROGRESS NOTES
CCPD Order   Exchanges: 5     Exchange Volume: 2500 ml   Total Time: 12 hrs   Dextrose: 2.5%   Last Fill: 0 ml   Total Volume: 52750 ml     Orders verified. Supplies taken to pt's room. Report received. Cycler set up, primed and pre tested. Dressing changed on [facility] Cath site. Pt connected to cycler. CCPD initiated without problem. Initial effluent clear.       If problems should arise please call the [phone] number on top of PD cycler machine.

## 2025-01-15 NOTE — PROGRESS NOTES
Treatment initiated without complications or complaints from patient. Patient resting comfortably with VSS upon dialysis RN exit from room. Riley Khalil, LAURA notified of start of treatment and hotline number located on top of machine for any questions about troubleshooting during after hours.

## 2025-01-15 NOTE — PROGRESS NOTES
Treatment time: 16 Hours 23 minutes  Net UF: -621 ml  Dwell Time: 38 minutes gained    Treatment completed without complications or complaints from patient. Effluent clear yellowish and lines taped to patient per protocol. Patient resting comfortably with VSS upon exiting room.      Copy of dialysis treatment record placed in chart, to be scanned into EMR and report given to Riley Khalil RN.

## 2025-01-15 NOTE — PROGRESS NOTES
Podiatric Surgery Daily Progress Note  Te Garay      Subjective :   Patient seen and examined this am at the bedside. Patient denies any acute overnight events. Patient understands that he will likely require surgery as an outpatient and is in agreement. Patient denies N/V/F/C/SOB. Patient denies calf pain, thigh pain, chest pain.     Review of Systems: A 12 point review of symptoms is unremarkable with the exception of the chief complaint. Patient specifically denies nausea, fever, vomiting, chills, shortness of breath, chest pain, abdominal pain, constipation or difficulty urinating.       Objective     /76   Pulse (!) 102   Temp 99 °F (37.2 °C) (Temporal)   Resp 16   Ht 1.727 m (5' 8\")   Wt 90.3 kg (199 lb 1.2 oz)   SpO2 98%   BMI 30.27 kg/m²      I/O:  Intake/Output Summary (Last 24 hours) at 1/15/2025 1007  Last data filed at 1/14/2025 2100  Gross per 24 hour   Intake 480 ml   Output 20 ml   Net 460 ml              Wt Readings from Last 3 Encounters:   01/14/25 90.3 kg (199 lb 1.2 oz)   12/10/24 90.3 kg (199 lb)   11/20/24 85.3 kg (188 lb)       LABS:    Recent Labs     01/14/25  0450 01/15/25  0427   WBC 4.0 5.4   HGB 11.1* 11.9*   HCT 33.4* 36.9*    194        Recent Labs     01/14/25  0450 01/15/25  0427    127*  128*   K 4.1 5.8*  5.8*   CL 98* 90*  90*   CO2 29 24  24   PHOS 3.1  --    BUN 36* 44*  44*   CREATININE 6.0* 6.4*  6.5*      No results for input(s): \"INR\", \"APTT\" in the last 72 hours.    Invalid input(s): \"PROT\"        LOWER EXTREMITY EXAMINATION    No dressing to b/l LE necessary.    VASCULAR: DP and PT pulses are weakly palpable 1/4 b/l. CFT is brisk to the digits of the foot b/l with exception of the right fifth toe which is absent. Skin temperature is warm to cool from proximal to distal with no focal calor noted.  No edema noted. No pain with calf compression b/l.     NEUROLOGIC: Gross and epicritic sensation is diminished b/l. Protective sensation is

## 2025-01-15 NOTE — PROGRESS NOTES
Pt called out stating he was nauseous and to check his BS. BS checked and it was 580. NP notified. Awaiting orders. Will monitor.

## 2025-01-15 NOTE — PROGRESS NOTES
Vascular Progress Note    1/15/2025 8:19 AM    Chief complaint / Reason for visit : PAD, right 5th toe ulcer    Subjective:  Patient resting in bed.  He reports he is doing okay.  No new complaints.  VSS, afebrile.     Vital Signs: /76   Pulse (!) 102   Temp 99 °F (37.2 °C) (Temporal)   Resp 16   Ht 1.727 m (5' 8\")   Wt 90.3 kg (199 lb 1.2 oz)   SpO2 98%   BMI 30.27 kg/m²  O2 Flow Rate (L/min): 0 L/min   I/O:    Intake/Output Summary (Last 24 hours) at 1/15/2025 0819  Last data filed at 1/14/2025 2100  Gross per 24 hour   Intake 600 ml   Output 20 ml   Net 580 ml       Physical Exam:   General: no apparent distress, appears stated age  Chest/Lungs:  no accessory muscle use  Cardiac:  regular rate and rhythm  Abdomen:  abdomen is soft without significant tenderness,+ PD catheter   Vascular: + right PT and peroneal doppler  Extremities: warm, no signs of cyanosis or ischemia, no significant edema, bilateral upper and lower extremity motorsensory intact.  Right 5th toe ulcer  Skin: left groin soft with no hematoma       Labs:   Lab Results   Component Value Date/Time     01/15/2025 04:27 AM     01/15/2025 04:27 AM    K 5.8 01/15/2025 04:27 AM    K 5.8 01/15/2025 04:27 AM    CL 90 01/15/2025 04:27 AM    CL 90 01/15/2025 04:27 AM    CO2 24 01/15/2025 04:27 AM    CO2 24 01/15/2025 04:27 AM    BUN 44 01/15/2025 04:27 AM    BUN 44 01/15/2025 04:27 AM    CREATININE 6.5 01/15/2025 04:27 AM    CREATININE 6.4 01/15/2025 04:27 AM    GFRAA 9 10/03/2022 04:45 AM    GFRAA 44 06/15/2013 01:00 AM    LABGLOM 9 01/15/2025 04:27 AM    LABGLOM 9 01/15/2025 04:27 AM    LABGLOM 6 04/30/2024 04:51 AM    GLUCOSE 709 01/15/2025 07:06 AM    PHOS 3.1 01/14/2025 04:50 AM    MG 2.06 01/15/2025 04:27 AM    CALCIUM 9.0 01/15/2025 04:27 AM    CALCIUM 9.0 01/15/2025 04:27 AM     Lab Results   Component Value Date/Time    WBC 5.4 01/15/2025 04:27 AM    RBC 4.11 01/15/2025 04:27 AM    HGB 11.9 01/15/2025 04:27 AM    HCT 36.9  tissue swelling and ulceration of the 5th digit with no radiographic  evidence of osteomyelitis.    Scheduled Meds:    insulin regular  10 Units IntraVENous Once    insulin lispro  5 Units SubCUTAneous TID WC    sodium chloride flush  5-40 mL IntraVENous 2 times per day    clopidogrel  75 mg Oral Daily    insulin glargine  18 Units SubCUTAneous Nightly    insulin lispro  0-16 Units SubCUTAneous 4x Daily AC & HS    aspirin  81 mg Oral Daily    calcitRIOL  0.25 mcg Oral Daily    carvedilol  12.5 mg Oral BID WC    isosorbide mononitrate  30 mg Oral Nightly    levETIRAcetam  500 mg Oral Daily    ranolazine  500 mg Oral BID    rosuvastatin  10 mg Oral Nightly    sacubitril-valsartan  1 tablet Oral BID    sertraline  50 mg Oral Nightly    sodium chloride flush  5-40 mL IntraVENous 2 times per day    furosemide  80 mg Oral Daily    sevelamer  800 mg Oral TID WC     Continuous Infusions:    sodium chloride      dextrose      sodium chloride           Assessment:   Right 5th toe ulceration with underling tibial artery disease based on duplex  PAD s/p right PT and AT PTA (1/14/25) - POD # 1   ESRD on PD  DM type 1  HTN  HLD    Plan:  Continue ASA, Plavix and statin therapy.  Okay to proceed with any podiatric surgery deemed appropriate to right foot.   Increase activity.   Podiatry managing wound care.   Nephrology managing PD.  Okay to discharge from vascular standpoint.  Follow up with Dr. Miller on 2/11 at 11:45 am.     Patient is stable from vascular standpoint.  We will sign off for now, but please do not hesitate to contact us with any questions.  Thank you for the consultation.         Patient educated on plan of care and disease process.  All questions answered.        Electronically signed by JAYLEN Guido CNP on 1/15/2025 at 8:19 AM

## 2025-01-15 NOTE — PROGRESS NOTES
[facility]   Nephrology progress Note  [phone]  [phone]   [place]           SUMMARY :  We are following this patient for ESRD on PD    59 y.o. male with PMH significant for  ESRD on PD, CAD/CABG/CHF, carotid artery stenosis, DM 2, diabetic neuropathy, hypertension, hyperlipidemia, seizure disorder came in with complaints of chest pain and shortness of breath, also reports lower extremity edema.   Chest x-ray with no acute process  He also had a x-ray of the right foot that showed soft tissue swelling and ulceration of the fifth digit with no radiographic evidence of osteomyelitis  Of note, cardiology was consulted for elevated troponins, no plans for any intervention at this time  Recently treated for PD related peritonitis, cultures were positive for Staph epidermidis, treated with vancomycin, completed vancomycin on 10/25/2024. Readmitted with peritonitis again in 2024, cultures again positive for staph epidermidis, discharged on intraperitoneal vancomycin     SUBJECTIVE:   Patient progress reviewed.  25:  The patient denies any shortness of breath or chest pain.  Peritoneal dialysis ongoing normally  25: Plan for Rt leg angiogram and possible intervention this evening   1/15/25: PAD s/p right PT and AT PTA (25) - POD # 1     Physical Exam:    VITALS:  /64   Pulse 100   Temp 98.2 °F (36.8 °C) (Temporal)   Resp 16   Ht 1.727 m (5' 8\")   Wt 90.3 kg (199 lb 1.2 oz)   SpO2 100%   BMI 30.27 kg/m²   BLOOD PRESSURE RANGE:  Systolic (24hrs), Av , Min:117 , Max:158   ; Diastolic (24hrs), Av, Min:64, Max:77    24HR INTAKE/OUTPUT:    Intake/Output Summary (Last 24 hours) at 1/15/2025 1147  Last data filed at 1/15/2025 1134  Gross per 24 hour   Intake 900 ml   Output 20 ml   Net 880 ml       Gen:  alert, oriented x 3  PERRL , EOM +  Pallor +, No icterus  JVP not raised   CV: RRR no murmur or rub .  Midsternal scar of CABG  Lungs:B/ L air

## 2025-01-15 NOTE — PROGRESS NOTES
Hospitalist Progress Note      PCP: Reid Lei MD    Date of Admission: 1/10/2025    LOS: 5    Chief Complaint:   Chief Complaint   Patient presents with    Chest Pain     Pt to ED c/o midsternal chest pain that radiates to left side x3 days. Pt reports pain became constant last night. PT reports increase in ankle swelling. Pt reports SOB. Pt with extensive cardiac history s/p cardiac stents 12/2024. Pt gets perineoanal dialysis nightly. Pt sts he has not missed dialysis.      Shortness of Breath    Leg Swelling       Case Summary:   59-year-old gentleman with history of end-stage renal disease on peritoneal dialysis, CAD status post CABG and multiple PCI, chronic heart failure with reduced EF, peripheral vascular disease, type 2 diabetes with neuropathy, hypertension who presented with chest pains and leg swelling found to have right toe diabetic ulcer.  He had chest pain which was considered noncardiac following cardiology evaluation.  He has right fifth toe ulcer due to peripheral arterial disease and was consulted by vascular       Active Hospital Problems    Diagnosis Date Noted    Hypervolemia associated with renal insufficiency [E87.70, N28.9] 01/11/2025    Atherosclerosis of native arteries of right leg with ulceration of other part of foot ([facility]) [I70.235] 01/10/2025    Chest pain [R07.9] 07/28/2024    ESRD on peritoneal dialysis ([facility]) [N18.6, Z99.2]     Primary hypertension [I10] 01/12/2019    HLD (hyperlipidemia) [E78.5]     PAD (peripheral artery disease) ([facility]) [I73.9]          Principal Problem:    Chest pain with chronically elevated troponins: Patient with history of CAD and CABG with multiple PCI's in the past. Recent left heart cath with SVG-OM1 stent December 2024.  Echocardiogram 1/14/2025 with a EF of 30 to 35% and no wall motion abnormality.  Patient was seen by cardiology and noted for stable troponins not consistent with ACS.  Will continue Plavix, aspirin, Ranexa, Coreg, Crestor

## 2025-01-16 VITALS
HEART RATE: 82 BPM | RESPIRATION RATE: 16 BRPM | HEIGHT: 68 IN | DIASTOLIC BLOOD PRESSURE: 64 MMHG | OXYGEN SATURATION: 100 % | BODY MASS INDEX: 28.4 KG/M2 | WEIGHT: 187.39 LBS | TEMPERATURE: 98.3 F | SYSTOLIC BLOOD PRESSURE: 121 MMHG

## 2025-01-16 LAB
ALBUMIN SERPL-MCNC: 3.3 G/DL (ref 3.4–5)
ALBUMIN/GLOB SERPL: 1.3 {RATIO} (ref 1.1–2.2)
ALP SERPL-CCNC: 125 U/L (ref 40–129)
ALT SERPL-CCNC: 11 U/L (ref 10–40)
ANION GAP SERPL CALCULATED.3IONS-SCNC: 11 MMOL/L (ref 3–16)
AST SERPL-CCNC: 16 U/L (ref 15–37)
BASOPHILS # BLD: 0 K/UL (ref 0–0.2)
BASOPHILS NFR BLD: 0.6 %
BILIRUB SERPL-MCNC: <0.2 MG/DL (ref 0–1)
BUN SERPL-MCNC: 47 MG/DL (ref 7–20)
CALCIUM SERPL-MCNC: 8.6 MG/DL (ref 8.3–10.6)
CHLORIDE SERPL-SCNC: 92 MMOL/L (ref 99–110)
CO2 SERPL-SCNC: 27 MMOL/L (ref 21–32)
CREAT SERPL-MCNC: 7.6 MG/DL (ref 0.9–1.3)
DEPRECATED RDW RBC AUTO: 17.5 % (ref 12.4–15.4)
EOSINOPHIL # BLD: 0.1 K/UL (ref 0–0.6)
EOSINOPHIL NFR BLD: 1 %
GFR SERPLBLD CREATININE-BSD FMLA CKD-EPI: 8 ML/MIN/{1.73_M2}
GLUCOSE BLD-MCNC: 134 MG/DL (ref 70–99)
GLUCOSE BLD-MCNC: 307 MG/DL (ref 70–99)
GLUCOSE BLD-MCNC: 446 MG/DL (ref 70–99)
GLUCOSE BLD-MCNC: 54 MG/DL (ref 70–99)
GLUCOSE BLD-MCNC: 65 MG/DL (ref 70–99)
GLUCOSE BLD-MCNC: 91 MG/DL (ref 70–99)
GLUCOSE SERPL-MCNC: 445 MG/DL (ref 70–99)
HCT VFR BLD AUTO: 32.9 % (ref 40.5–52.5)
HGB BLD-MCNC: 10.8 G/DL (ref 13.5–17.5)
LYMPHOCYTES # BLD: 1.4 K/UL (ref 1–5.1)
LYMPHOCYTES NFR BLD: 25.8 %
MAGNESIUM SERPL-MCNC: 2.16 MG/DL (ref 1.8–2.4)
MCH RBC QN AUTO: 29.1 PG (ref 26–34)
MCHC RBC AUTO-ENTMCNC: 32.9 G/DL (ref 31–36)
MCV RBC AUTO: 88.6 FL (ref 80–100)
MONOCYTES # BLD: 0.5 K/UL (ref 0–1.3)
MONOCYTES NFR BLD: 10.3 %
NEUTROPHILS # BLD: 3.3 K/UL (ref 1.7–7.7)
NEUTROPHILS NFR BLD: 62.3 %
PERFORMED ON: ABNORMAL
PERFORMED ON: NORMAL
PLATELET # BLD AUTO: 184 K/UL (ref 135–450)
PMV BLD AUTO: 9.1 FL (ref 5–10.5)
POTASSIUM SERPL-SCNC: 4.2 MMOL/L (ref 3.5–5.1)
PROT SERPL-MCNC: 5.8 G/DL (ref 6.4–8.2)
RBC # BLD AUTO: 3.71 M/UL (ref 4.2–5.9)
SODIUM SERPL-SCNC: 130 MMOL/L (ref 136–145)
WBC # BLD AUTO: 5.3 K/UL (ref 4–11)

## 2025-01-16 PROCEDURE — 2500000003 HC RX 250 WO HCPCS: Performed by: SURGERY

## 2025-01-16 PROCEDURE — 85025 COMPLETE CBC W/AUTO DIFF WBC: CPT

## 2025-01-16 PROCEDURE — 6370000000 HC RX 637 (ALT 250 FOR IP): Performed by: SURGERY

## 2025-01-16 PROCEDURE — 83735 ASSAY OF MAGNESIUM: CPT

## 2025-01-16 PROCEDURE — 6360000002 HC RX W HCPCS: Performed by: SURGERY

## 2025-01-16 PROCEDURE — 36415 COLL VENOUS BLD VENIPUNCTURE: CPT

## 2025-01-16 PROCEDURE — 80053 COMPREHEN METABOLIC PANEL: CPT

## 2025-01-16 PROCEDURE — 6370000000 HC RX 637 (ALT 250 FOR IP): Performed by: INTERNAL MEDICINE

## 2025-01-16 PROCEDURE — 2580000003 HC RX 258: Performed by: SURGERY

## 2025-01-16 RX ORDER — INSULIN LISPRO 100 [IU]/ML
10 INJECTION, SOLUTION INTRAVENOUS; SUBCUTANEOUS ONCE
Status: COMPLETED | OUTPATIENT
Start: 2025-01-16 | End: 2025-01-16

## 2025-01-16 RX ORDER — OXYCODONE AND ACETAMINOPHEN 10; 325 MG/1; MG/1
1 TABLET ORAL EVERY 4 HOURS PRN
Status: DISCONTINUED | OUTPATIENT
Start: 2025-01-16 | End: 2025-01-16 | Stop reason: HOSPADM

## 2025-01-16 RX ADMIN — INSULIN LISPRO 5 UNITS: 100 INJECTION, SOLUTION INTRAVENOUS; SUBCUTANEOUS at 08:30

## 2025-01-16 RX ADMIN — SACUBITRIL AND VALSARTAN 1 TABLET: 49; 51 TABLET, FILM COATED ORAL at 08:28

## 2025-01-16 RX ADMIN — HYDROMORPHONE HYDROCHLORIDE 0.5 MG: 1 INJECTION, SOLUTION INTRAMUSCULAR; INTRAVENOUS; SUBCUTANEOUS at 04:55

## 2025-01-16 RX ADMIN — CALCITRIOL CAPSULES 0.25 MCG 0.25 MCG: 0.25 CAPSULE ORAL at 08:28

## 2025-01-16 RX ADMIN — SODIUM CHLORIDE, PRESERVATIVE FREE 10 ML: 5 INJECTION INTRAVENOUS at 08:33

## 2025-01-16 RX ADMIN — LEVETIRACETAM 500 MG: 500 TABLET, FILM COATED ORAL at 08:28

## 2025-01-16 RX ADMIN — ONDANSETRON HYDROCHLORIDE 4 MG: 2 SOLUTION INTRAMUSCULAR; INTRAVENOUS at 09:52

## 2025-01-16 RX ADMIN — ASPIRIN 81 MG: 81 TABLET, COATED ORAL at 08:28

## 2025-01-16 RX ADMIN — INSULIN LISPRO 12 UNITS: 100 INJECTION, SOLUTION INTRAVENOUS; SUBCUTANEOUS at 08:29

## 2025-01-16 RX ADMIN — INSULIN LISPRO 10 UNITS: 100 INJECTION, SOLUTION INTRAVENOUS; SUBCUTANEOUS at 05:13

## 2025-01-16 RX ADMIN — CARVEDILOL 12.5 MG: 6.25 TABLET, FILM COATED ORAL at 08:28

## 2025-01-16 RX ADMIN — SEVELAMER CARBONATE 800 MG: 800 TABLET, FILM COATED ORAL at 08:28

## 2025-01-16 RX ADMIN — DEXTROSE MONOHYDRATE 125 ML: 100 INJECTION, SOLUTION INTRAVENOUS at 12:27

## 2025-01-16 RX ADMIN — RANOLAZINE 500 MG: 500 TABLET, EXTENDED RELEASE ORAL at 08:28

## 2025-01-16 RX ADMIN — Medication 10 ML: at 08:32

## 2025-01-16 RX ADMIN — FUROSEMIDE 80 MG: 40 TABLET ORAL at 08:28

## 2025-01-16 RX ADMIN — CLOPIDOGREL BISULFATE 75 MG: 75 TABLET ORAL at 08:29

## 2025-01-16 RX ADMIN — Medication 10 ML: at 02:02

## 2025-01-16 RX ADMIN — ONDANSETRON HYDROCHLORIDE 4 MG: 2 SOLUTION INTRAMUSCULAR; INTRAVENOUS at 04:55

## 2025-01-16 ASSESSMENT — PAIN DESCRIPTION - ORIENTATION: ORIENTATION: RIGHT;MID

## 2025-01-16 ASSESSMENT — PAIN SCALES - GENERAL
PAINLEVEL_OUTOF10: 4
PAINLEVEL_OUTOF10: 0

## 2025-01-16 ASSESSMENT — PAIN DESCRIPTION - DESCRIPTORS: DESCRIPTORS: DISCOMFORT

## 2025-01-16 ASSESSMENT — PAIN DESCRIPTION - LOCATION: LOCATION: CHEST

## 2025-01-16 NOTE — CARE COORDINATION
Case Management -  Discharge Note      Patient Name: Te Garay                   YOB: 1965  Room: A0X-7813/5917-            Readmission Risk (Low < 19, Mod (19-27), High > 27): Readmission Risk Score: 33.4    Current PCP: Reid Lei MD      ([facility]) Important Message from Medicare:    Has pt received appropriate compliance notices before being discharged if required: yes  Compliance doc:  [] 2nd IMM; [] Code 44 [] Dodd    Date Given: 1/16/25 Given By: Sachi ()    PT AM-PAC: 24 /24  OT AM-PAC: 24 /24    Patient/patient representative has been educated on the benefits of HHC as well as the possible risks of declining recommended services. Patient/patient representative has acknowledged the information provided and decided on the following discharge plan. Patient/ patient representative has been provided freedom of choice regarding service provider, supported by basic dialogue that supports the patient's individualized plan of care/goals.    Home Care Information:   Is patient resuming current home health care services: Yes -     Home Care Agency:   [facility]  [address] [place] [address] [address],   [place] [address]  Phone: [phone]  Fax: [phone]             Services: Skilled Nursing   Home Health Order Obtained: Yes    Home health agency notified of discharge.       HHC agency notified of discharge:  [x] Yes [] No  [] NA    Family notified of discharge:  [x] Yes  [] No  [] NA    Facility notified of discharge:  [] Yes  [] No  [x] NA       Dialysis Facility (if applicable) -   Home Peritoneal  Dialysis  Name:[facility] [place] [address] , [place] [address]  Phone:  [phone]  Fax: [phone]      Financial    Payor: MEDICARE / Plan: MEDICARE PART A AND B / Product Type: *No Product type* /     Pharmacy:  Potential assistance Purchasing Medications: No  Meds-to-Beds request: Yes      [facility] - [place], [place] - [address] [place] [address]

## 2025-01-16 NOTE — PROGRESS NOTES
1202- Pt BG 54 prior to lunch. Instructed pt to eat lunch and drink 8oz of apple juice. Pt diaphoretic and states he feels a little confused.  1225- BG recheck 65. Pt drank additional 8oz of apple juice and 125ml of D10 bolus initiated.  1232-BG recheck 91. Pt states he is beginning to feel better. No longer diaphoretic.    Perfect serve sent to Dr. Villalpando.

## 2025-01-16 NOTE — PLAN OF CARE
Problem: Chronic Conditions and Co-morbidities  Goal: Patient's chronic conditions and co-morbidity symptoms are monitored and maintained or improved  1/16/2025 1355 by Alix Tobias RN  Outcome: Adequate for Discharge  1/16/2025 1355 by Alix Tobias RN  Outcome: Progressing     Problem: Discharge Planning  Goal: Discharge to home or other facility with appropriate resources  1/16/2025 1355 by Alix Tobias RN  Outcome: Adequate for Discharge  1/16/2025 1355 by Alix Tobias RN  Outcome: Progressing     Problem: Pain  Goal: Verbalizes/displays adequate comfort level or baseline comfort level  1/16/2025 1355 by Alix Tobias RN  Outcome: Adequate for Discharge  1/16/2025 1355 by Alix Tobias RN  Outcome: Progressing     Problem: Metabolic/Fluid and Electrolytes - Adult  Goal: Electrolytes maintained within normal limits  1/16/2025 1355 by Alix Tobias RN  Outcome: Adequate for Discharge  1/16/2025 1355 by Alix Tobias RN  Outcome: Progressing  Goal: Hemodynamic stability and optimal renal function maintained  1/16/2025 1355 by Alix Tobias RN  Outcome: Adequate for Discharge  1/16/2025 1355 by Alix Tobias RN  Outcome: Progressing  Goal: Glucose maintained within prescribed range  1/16/2025 1355 by Alix Tobias RN  Outcome: Adequate for Discharge  1/16/2025 1355 by Alix Tobias RN  Outcome: Progressing     Problem: Safety - Adult  Goal: Free from fall injury  1/16/2025 1355 by Alix Tobias RN  Outcome: Adequate for Discharge  1/16/2025 1355 by Alix Tobias RN  Outcome: Progressing

## 2025-01-16 NOTE — PLAN OF CARE
Problem: Chronic Conditions and Co-morbidities  Goal: Patient's chronic conditions and co-morbidity symptoms are monitored and maintained or improved  Outcome: Progressing     Problem: Pain  Goal: Verbalizes/displays adequate comfort level or baseline comfort level  1/15/2025 2119 by Ellen Lilly, RN  Outcome: Progressing     Problem: Metabolic/Fluid and Electrolytes - Adult  Goal: Electrolytes maintained within normal limits  1/15/2025 2119 by Ellen Lilly, RN  Outcome: Progressing     Problem: Metabolic/Fluid and Electrolytes - Adult  Goal: Hemodynamic stability and optimal renal function maintained  1/15/2025 2119 by Ellen Lilly, RN  Outcome: Progressing     Problem: Safety - Adult  Goal: Free from fall injury  Outcome: Progressing

## 2025-01-16 NOTE — CARE COORDINATION
01/16/25 1232   IMM Letter   IMM Letter given to Patient/Family/Significant other/Guardian/POA/by: IMM given by CM   IMM Letter date given: 01/16/25   IMM Letter time given: 1204  (Pt agreeable to IMM w/o 4hr. notice.)

## 2025-01-16 NOTE — DISCHARGE SUMMARY
Hospital Medicine Discharge Summary    Patient ID: Te Garay      Patient's PCP: Reid Lei MD    Admit Date: 1/10/2025     Discharge Date: 1/16/2025      Admitting Provider: Jayla Rubio MD     Discharge Provider: Dakotah Villalpando MD     Discharge Diagnoses:       Active Hospital Problems    Diagnosis     Hypervolemia associated with renal insufficiency [E87.70, N28.9]     Atherosclerosis of native arteries of right leg with ulceration of other part of foot (HCC) [I70.235]     Chest pain [R07.9]     ESRD on peritoneal dialysis (HCC) [N18.6, Z99.2]     Primary hypertension [I10]     HLD (hyperlipidemia) [E78.5]     PAD (peripheral artery disease) (HCC) [I73.9]        The patient was seen and examined on day of discharge and this discharge summary is in conjunction with any daily progress note from day of discharge.    Hospital Course:      The patient is a 59-year-old gentleman with history of end-stage renal disease on peritoneal dialysis, CAD status post CABG and multiple PCI, chronic heart failure with reduced EF, peripheral vascular disease, type 2 diabetes with neuropathy, hypertension who presented with chest pains and leg swelling found to have right toe diabetic ulcer.  He had chest pain which was considered noncardiac following cardiology evaluation.  He has right fifth toe ulcer due to peripheral arterial disease and was consulted by vascular         Chest pain with chronically elevated troponins: Patient with history of CAD and CABG with multiple PCI's in the past. Recent left heart cath with SVG-OM1 stent December 2024.  Echocardiogram 1/14/2025 with a EF of 30 to 35% and no wall motion abnormality.  Patient was seen by cardiology and noted for stable troponins not consistent with ACS.  Will continue Plavix, aspirin, Ranexa, Coreg, Crestor and Imdur.         Diabetic right foot toe ulcer with dry gangrene: With peripheral vascular disease.  Patient consulted by vascular  discharge: 35 minutes in the examination, evaluation, counseling and review of medications and discharge plan.      Signed:    Dakotah Villalpando MD   1/16/2025      Thank you Reid Lei MD for the opportunity to be involved in this patient's care. If you have any questions or concerns, please feel free to contact me at [phone].

## 2025-01-16 NOTE — PROGRESS NOTES
[facility]   Nephrology progress Note  [phone]  [phone]   [facility]           SUMMARY :  We are following this patient for ESRD on PD    59 y.o. male with PMH significant for  ESRD on PD, CAD/CABG/CHF, carotid artery stenosis, DM 2, diabetic neuropathy, hypertension, hyperlipidemia, seizure disorder came in with complaints of chest pain and shortness of breath, also reports lower extremity edema.   Chest x-ray with no acute process  He also had a x-ray of the right foot that showed soft tissue swelling and ulceration of the fifth digit with no radiographic evidence of osteomyelitis  Of note, cardiology was consulted for elevated troponins, no plans for any intervention at this time  Recently treated for PD related peritonitis, cultures were positive for Staph epidermidis, treated with vancomycin, completed vancomycin on 10/25/2024. Readmitted with peritonitis again in 2024, cultures again positive for staph epidermidis, discharged on intraperitoneal vancomycin     SUBJECTIVE:   Patient progress reviewed.  25:  The patient denies any shortness of breath or chest pain.  Peritoneal dialysis ongoing normally  25: Plan for Rt leg angiogram and possible intervention this evening   1/15/25: PAD s/p right PT and AT PTA (25) - POD # 1   25:     Physical Exam:    VITALS:  /64   Pulse 82   Temp 98.3 °F (36.8 °C) (Temporal)   Resp 16   Ht 1.727 m (5' 8\")   Wt 90.3 kg (199 lb 1.2 oz)   SpO2 100%   BMI 30.27 kg/m²   BLOOD PRESSURE RANGE:  Systolic (24hrs), Av , Min:104 , Max:149   ; Diastolic (24hrs), Av, Min:61, Max:78    24HR INTAKE/OUTPUT:    Intake/Output Summary (Last 24 hours) at 2025 1104  Last data filed at 2025 1000  Gross per 24 hour   Intake 1560 ml   Output --   Net 1560 ml       Gen:  alert, oriented x 3  PERRL , EOM +  Pallor +, No icterus  JVP not raised   CV: RRR no murmur or rub .  Midsternal scar of CABG  Lungs:B/  01/16/2025 05:38 AM           IMPRESSION/RECOMMENDATIONS:      Diagnosis and Plan     ESRD on peritoneal dialysis,   PD effluent clear  Volume status okay  Continue current treatment  Na 130 , Glucose 446. 307  Anemia of chronic kidney disease  Target hemoglobin 9-11, Hb 10.8, starting to drop , one dose of SQ PRSICILLA  Renal osteodystrophy  Monitor calcium phosphorus levels  Hypomagnesemia  Corrected with magnesium at 2.1  Peripheral vascular disease  Right fifth toe gangrene  PAD s/p right PT and AT PTA (1/14/25) - POD # 1   Coronary artery disease s/p CABG  Hypertension  Blood pressure controlled  Ischemic cardiomyopathy  Echo EF is 40%  On Entresto and Coreg  T2DM  Per primary       Gilson Pryor MD

## 2025-01-16 NOTE — PROGRESS NOTES
Podiatric Surgery Daily Progress Note  Te Garay      Subjective :   Patient seen and examined this am at the bedside. Patient denies any acute overnight events. Patient understands that he will likely require surgery as an outpatient and is in agreement. Patient denies N/V/F/C/SOB. Patient denies calf pain, thigh pain, chest pain.     Review of Systems: A 12 point review of symptoms is unremarkable with the exception of the chief complaint. Patient specifically denies nausea, fever, vomiting, chills, shortness of breath, chest pain, abdominal pain, constipation or difficulty urinating.       Objective     /64   Pulse 82   Temp 98.3 °F (36.8 °C) (Temporal)   Resp 16   Ht 1.727 m (5' 8\")   Wt 85 kg (187 lb 6.3 oz)   SpO2 100%   BMI 28.49 kg/m²      I/O:  Intake/Output Summary (Last 24 hours) at 1/16/2025 1345  Last data filed at 1/16/2025 1000  Gross per 24 hour   Intake 1380 ml   Output --   Net 1380 ml              Wt Readings from Last 3 Encounters:   01/16/25 85 kg (187 lb 6.3 oz)   12/10/24 90.3 kg (199 lb)   11/20/24 85.3 kg (188 lb)       LABS:    Recent Labs     01/15/25  0427 01/16/25  0538   WBC 5.4 5.3   HGB 11.9* 10.8*   HCT 36.9* 32.9*    184        Recent Labs     01/14/25  0450 01/15/25  0427 01/16/25  0538      < > 130*   K 4.1   < > 4.2   CL 98*   < > 92*   CO2 29   < > 27   PHOS 3.1  --   --    BUN 36*   < > 47*   CREATININE 6.0*   < > 7.6*    < > = values in this interval not displayed.      No results for input(s): \"INR\", \"APTT\" in the last 72 hours.    Invalid input(s): \"PROT\"        LOWER EXTREMITY EXAMINATION    No dressing to b/l LE necessary.    VASCULAR: DP and PT pulses are weakly palpable 1/4 b/l. CFT is brisk to the digits of the foot b/l with exception of the right fifth toe which is absent. Skin temperature is warm to cool from proximal to distal with no focal calor noted.  No edema noted. No pain with calf compression b/l.     NEUROLOGIC: Gross and

## 2025-01-16 NOTE — PROGRESS NOTES
Pt has order for discharge. AVS and discharge instructions reviewed with pt. No questions or concerns at this time. PIV and tele removed. Pt escorted to exit with all belongings

## 2025-01-16 NOTE — PROGRESS NOTES
Treatment time:  12 Hours 41 minutes  Net UF:  3106 ml  Dwell Time: 31 minutes Gained     Treatment completed without complications or complaints from patient. Effluent clear yellow and lines taped to patient per protocol. Patient resting comfortably with VSS upon exiting room.      Copy of dialysis treatment record placed in chart, to be scanned into EMR and report given to RN.

## 2025-01-17 ENCOUNTER — TELEPHONE (OUTPATIENT)
Dept: OTHER | Age: 60
End: 2025-01-17

## 2025-01-17 LAB
EKG ATRIAL RATE: 87 BPM
EKG DIAGNOSIS: NORMAL
EKG P AXIS: 55 DEGREES
EKG P-R INTERVAL: 142 MS
EKG Q-T INTERVAL: 394 MS
EKG QRS DURATION: 104 MS
EKG QTC CALCULATION (BAZETT): 474 MS
EKG R AXIS: 39 DEGREES
EKG T AXIS: 80 DEGREES
EKG VENTRICULAR RATE: 87 BPM

## 2025-01-17 NOTE — TELEPHONE ENCOUNTER
Called and spoke with patient. States weight is stable. Denies any edema, dyspnea or chest pain. Patient is scheduled to see cardiology on 1/31. Patient follows sodium/fluids limit. No changes in medications at discharge. Encouraged patient to call with any questions or concerns.

## 2025-01-24 ENCOUNTER — OFFICE VISIT (OUTPATIENT)
Dept: INTERNAL MEDICINE CLINIC | Age: 60
End: 2025-01-24
Payer: MEDICARE

## 2025-01-24 VITALS — TEMPERATURE: 96.9 F

## 2025-01-24 DIAGNOSIS — I96 GANGRENOUS TOE (HCC): Primary | ICD-10-CM

## 2025-01-24 DIAGNOSIS — E10.65 POORLY CONTROLLED TYPE 1 DIABETES MELLITUS (HCC): ICD-10-CM

## 2025-01-24 PROCEDURE — 99213 OFFICE O/P EST LOW 20 MIN: CPT

## 2025-01-27 NOTE — PROGRESS NOTES
Department of Podiatry  Resident Progress Note    Te Garay  Allergies: Iodides, Shellfish-derived products, Morphine, and Lipitor [atorvastatin]    SUBJECTIVE  The patient is a 59 y.o. male who presents with follow-up for right fifth gangrenous digit.  Patient has recently been discharged from the [facility].  Patient has been painting his right fifth digit with Betadine.  Patient has no foot pain at this time.  No other pedal complaints.  Denies N/V/C/SOB    Past Medical History:        Diagnosis Date    Cardiomyopathy ([facility])     Carotid artery stenosis 10/25/2016    Coronary artery disease     De Quervain thyroiditis 06/08/2023    ESRD (end stage renal disease) (Former[facility])     HFrEF (heart failure with reduced ejection fraction) (Formerly [facility])     Hyperlipidemia 05/26/2010    Hyperparathyroidism (secondary) 04/25/2024    Hypertension 01/12/2019    Overweight     Peripheral neuropathy     Poorly controlled type 1 diabetes mellitus ([facility])     PVD (peripheral vascular disease) ([facility])     Seizure disorder ([facility])     TIA (transient ischemic attack) 2024       REVIEW OF SYSTEMS:  Review of Systems: Pertinent positive and negative findings as documented in the HPI, otherwise all other systems were reviewed and were negative.       OBJECTIVE  Patient presents assisted ambulating in tennis shoes    VASCULAR: DP and PT pulses are weakly palpable 1/4 b/l. CFT is brisk to the digits of the foot b/l with exception of the right fifth toe which is absent. Skin temperature is warm to cool from proximal to distal with no focal calor noted.  No edema noted. No pain with calf compression b/l.     NEUROLOGIC: Gross and epicritic sensation is diminished b/l. Protective sensation is intact at all pedal sites b/l.     DERMATOLOGIC:      Right lower extremity:  Fifth digit displaying signs of dry gangrene to the distal 50% of the digit.  No open wounds noted.  There is mild amounts of interdigital maceration.  No malodor.     Left lower

## 2025-01-28 DIAGNOSIS — N18.4 TYPE 1 DIABETES MELLITUS WITH STAGE 4 CHRONIC KIDNEY DISEASE (HCC): ICD-10-CM

## 2025-01-28 DIAGNOSIS — E10.22 TYPE 1 DIABETES MELLITUS WITH STAGE 4 CHRONIC KIDNEY DISEASE (HCC): ICD-10-CM

## 2025-01-28 RX ORDER — INSULIN LISPRO 100 [IU]/ML
INJECTION, SOLUTION INTRAVENOUS; SUBCUTANEOUS
Qty: 20 ML | Refills: 2 | OUTPATIENT
Start: 2025-01-28

## 2025-01-31 ENCOUNTER — OFFICE VISIT (OUTPATIENT)
Dept: CARDIOLOGY CLINIC | Age: 60
End: 2025-01-31
Payer: MEDICARE

## 2025-01-31 ENCOUNTER — TELEPHONE (OUTPATIENT)
Dept: CARDIOLOGY CLINIC | Age: 60
End: 2025-01-31

## 2025-01-31 VITALS
WEIGHT: 186.8 LBS | SYSTOLIC BLOOD PRESSURE: 120 MMHG | BODY MASS INDEX: 28.31 KG/M2 | HEIGHT: 68 IN | OXYGEN SATURATION: 99 % | HEART RATE: 94 BPM | DIASTOLIC BLOOD PRESSURE: 66 MMHG

## 2025-01-31 DIAGNOSIS — I20.0 ANGINA PECTORIS, UNSTABLE (HCC): ICD-10-CM

## 2025-01-31 DIAGNOSIS — I25.718 CORONARY ARTERY DISEASE OF AUTOLOGOUS VEIN BYPASS GRAFT WITH STABLE ANGINA PECTORIS (HCC): ICD-10-CM

## 2025-01-31 DIAGNOSIS — N18.4 TYPE 1 DIABETES MELLITUS WITH STAGE 4 CHRONIC KIDNEY DISEASE (HCC): ICD-10-CM

## 2025-01-31 DIAGNOSIS — I42.9 CARDIOMYOPATHY, UNSPECIFIED TYPE (HCC): ICD-10-CM

## 2025-01-31 DIAGNOSIS — I20.0 UNSTABLE ANGINA (HCC): ICD-10-CM

## 2025-01-31 DIAGNOSIS — E10.22 TYPE 1 DIABETES MELLITUS WITH STAGE 4 CHRONIC KIDNEY DISEASE (HCC): ICD-10-CM

## 2025-01-31 DIAGNOSIS — I50.22 CHRONIC SYSTOLIC HEART FAILURE (HCC): Primary | ICD-10-CM

## 2025-01-31 DIAGNOSIS — Z95.1 S/P CABG (CORONARY ARTERY BYPASS GRAFT): ICD-10-CM

## 2025-01-31 PROBLEM — Z98.61 CAD S/P PERCUTANEOUS CORONARY ANGIOPLASTY: Status: ACTIVE | Noted: 2024-07-28

## 2025-01-31 PROCEDURE — G8427 DOCREV CUR MEDS BY ELIG CLIN: HCPCS | Performed by: INTERNAL MEDICINE

## 2025-01-31 PROCEDURE — 3078F DIAST BP <80 MM HG: CPT | Performed by: INTERNAL MEDICINE

## 2025-01-31 PROCEDURE — 99215 OFFICE O/P EST HI 40 MIN: CPT | Performed by: INTERNAL MEDICINE

## 2025-01-31 PROCEDURE — 93000 ELECTROCARDIOGRAM COMPLETE: CPT | Performed by: INTERNAL MEDICINE

## 2025-01-31 PROCEDURE — G8417 CALC BMI ABV UP PARAM F/U: HCPCS | Performed by: INTERNAL MEDICINE

## 2025-01-31 PROCEDURE — 3017F COLORECTAL CA SCREEN DOC REV: CPT | Performed by: INTERNAL MEDICINE

## 2025-01-31 PROCEDURE — 1111F DSCHRG MED/CURRENT MED MERGE: CPT | Performed by: INTERNAL MEDICINE

## 2025-01-31 PROCEDURE — 1036F TOBACCO NON-USER: CPT | Performed by: INTERNAL MEDICINE

## 2025-01-31 PROCEDURE — 3074F SYST BP LT 130 MM HG: CPT | Performed by: INTERNAL MEDICINE

## 2025-01-31 RX ORDER — ROSUVASTATIN CALCIUM 40 MG/1
40 TABLET, COATED ORAL DAILY
Qty: 90 TABLET | Refills: 3 | Status: SHIPPED | OUTPATIENT
Start: 2025-01-31

## 2025-01-31 RX ORDER — INSULIN ASPART INJECTION 100 [IU]/ML
INJECTION, SOLUTION SUBCUTANEOUS
Qty: 20 ML | Refills: 0 | Status: SHIPPED | OUTPATIENT
Start: 2025-01-31

## 2025-01-31 RX ORDER — TORSEMIDE 20 MG/1
20 TABLET ORAL DAILY
COMMUNITY
End: 2025-01-31 | Stop reason: CLARIF

## 2025-01-31 RX ORDER — PREDNISONE 50 MG/1
TABLET ORAL
Qty: 3 TABLET | Refills: 0 | Status: SHIPPED | OUTPATIENT
Start: 2025-01-31 | End: 2025-02-03 | Stop reason: SDUPTHER

## 2025-01-31 RX ORDER — TORSEMIDE 100 MG/1
100 TABLET ORAL DAILY
COMMUNITY

## 2025-01-31 NOTE — PATIENT INSTRUCTIONS
6 months of plavix and aspirin without interruption from most recent stent.   Elizabeth will call to get you scheduled for an angiogram

## 2025-01-31 NOTE — TELEPHONE ENCOUNTER
I reminded pt about his pre treatment medications and sent them to his pharmacy. He has recent labs, so he should not need new ones unless we schedule after 2/16, if that is the case I will order new ones.

## 2025-01-31 NOTE — TELEPHONE ENCOUNTER
Please set patient up for C with [facility]  (If scheduling is an issue - he is ok with franca/lashell/brett)  just let me know so they can get a heads up.     Left Heart Cath Patient Pre-procedure Instructions    Do NOT eat or drink anything after midnight morning of procedure    MEDICATIONS:  Your medications have been reviewed. Please follow medication instructions below  It is okay to drink a sip of water with meds morning of procedure  Diuretics- Hold diuretics (water pill) for comfort morning of procedure. Your diuretics to hold: Torsemide (Demadex)    Transportation: Bring someone to drive you home.     Scheduling: Elizabeth GRANT is our full time procedure . She will call you to schedule your procedure.    Allergies: Do you have an allergy to dye or contrast? Yes: We need to pre-treat your allergy with prednisone. Take 50mg 13 hours before and 50mg 7 hours before. Your last dose of prednisone and IV Benadryl will be given before your procedure.    Labs: We need to check blood work within 30 days of your procedure (CBC & BMP). Please go to any [facility] lab to get your labs drawn prior to your procedure.

## 2025-01-31 NOTE — TELEPHONE ENCOUNTER
Medication:   Requested Prescriptions     Pending Prescriptions Disp Refills    Insulin Aspart, w/Niacinamide, (FIASP) 100 UNIT/ML SOLN [Pharmacy Med Name: FIASP 100/ML INJ, 10ML] 20 mL 2     Sig: USE 30-50 UNITS DAILY VIA PUMP       Last Filled:      Patient Phone Number: [phone] (home)     Last appt: 8/21/2023   Next appt: Visit date not found    Last Labs DM:   Lab Results   Component Value Date/Time    LABA1C 8.6 10/14/2024 05:31 AM

## 2025-02-03 PROBLEM — I20.0 UNSTABLE ANGINA (HCC): Status: ACTIVE | Noted: 2025-01-31

## 2025-02-03 RX ORDER — PREDNISONE 50 MG/1
TABLET ORAL
Qty: 3 TABLET | Refills: 0 | Status: SHIPPED | OUTPATIENT
Start: 2025-02-03

## 2025-02-03 NOTE — TELEPHONE ENCOUNTER
I called and spoke to Te and he stated he wants to wait till Dr. Celestin is available to do his procedure.     Date of Procedure: Tuesday 2/18/25 @ [facility] with Dr. Celestin     Time of arrival: 7:30 am     Procedure time: 9:00 am     Te is agreeable to date and time. Reviewed and emailed Te his procedure date, time and  instructions and he verbalized understanding. Encouraged to call with any questions or concerns.     Te also asked that his pre treatment medication be sent to:  [id number] [place], [place] - [address] - P [phone] - F [phone]  [address] [facility] [address]  Phone: [phone]  Fax: [phone]     Published on [employer], scheduled on [employer] and e-mailed to cath lab.

## 2025-02-04 ENCOUNTER — TELEPHONE (OUTPATIENT)
Dept: CARDIOLOGY CLINIC | Age: 60
End: 2025-02-04

## 2025-02-04 NOTE — TELEPHONE ENCOUNTER
Pt asking if it would be ok to travel to [place] on 2/13/25 coming back 2/16/25 prior to his sx on 2/18/25. Please call to advise

## 2025-02-05 NOTE — TELEPHONE ENCOUNTER
LVM for patient. [facility] is advising that he hold off on travel until after [facility]

## 2025-02-07 ENCOUNTER — TELEPHONE (OUTPATIENT)
Dept: CARDIOLOGY CLINIC | Age: 60
End: 2025-02-07

## 2025-02-07 NOTE — TELEPHONE ENCOUNTER
Patient would like to speak with [facility]'s nurse Catrachita, regarding his upcoming procedures, he has one scheduled with Dr Lowe on 2/17/25, and then with [facility] on 2/18/25.  He is asking if it would be more efficient for him to stay over night in the hospital after his first procedure so that he will already be there for his second procedure the following day.  Please call to discuss [phone]

## 2025-02-07 NOTE — TELEPHONE ENCOUNTER
Lvm for patient. Will call him back Monday to discuss. Kjc would prefer to space out the procedures in case they need to intervene or if he is having amputation.

## 2025-02-10 RX ORDER — SACUBITRIL AND VALSARTAN 49; 51 MG/1; MG/1
1 TABLET, FILM COATED ORAL 2 TIMES DAILY
Qty: 60 TABLET | Refills: 2 | Status: SHIPPED | OUTPATIENT
Start: 2025-02-10

## 2025-02-10 NOTE — TELEPHONE ENCOUNTER
Pt did not answer, did not leave voicemail, instead attempted to call wife, LVM with her for call back to discuss

## 2025-02-11 ENCOUNTER — TELEPHONE (OUTPATIENT)
Dept: CARDIOLOGY CLINIC | Age: 60
End: 2025-02-11

## 2025-02-11 NOTE — TELEPHONE ENCOUNTER
This patient stated he needs to have a procedure for toe amputation. He sees Dr Celestin with Cardiology. We received an H&P but wondering if you are requesting he hold plavix? He had recent stents and Dr Celestin would not want him to interrupt aspirin/plavix just yet. Just wanted clarification on what you needed.     Thanks,   Catrachita SANCHEZ

## 2025-02-11 NOTE — TELEPHONE ENCOUNTER
Patient is having surgery on his right pinky toe on 2/17/25 and then a LHC on 2/18/25.  Patient would like to know if he should be admitted on the 2/17/25 instead of the 2/18/25.  Please call patient and advise.

## 2025-02-11 NOTE — TELEPHONE ENCOUNTER
CARDIAC CLEARANCE REQUEST    What type of procedure are you having: Right pinky toe    Are you taking any blood thinners: Yes    When is your procedure scheduled for: 2/17/25    What physician is performing your procedure: David Lowe    Phone Number:     Fax number to send the letter: [phone]    Patient dropped off a cardiac clearance form, placed in Dr. Celestin's folder

## 2025-02-12 NOTE — TELEPHONE ENCOUNTER
Pt called asking to speak with Catrachita, he had some f/u questions about what was discussed. Please advise

## 2025-02-12 NOTE — TELEPHONE ENCOUNTER
Spoke with Te to let him know that I have reached out to Dr Mcknight office to discuss his procedure for next week. It is not recommended that he hold plavix / asa at this time since he has stents less than 2 months old. Dr Lowe could potentially admit the patient and place him on kangrelor prior to procedure.   [facility] is ok to proceed with [facility] as planned, the procedure in question is his partial amputation of his foot. Patient was given a number at [facility] to have Dr Mcknight office reach out with questions.

## 2025-02-13 NOTE — TELEPHONE ENCOUNTER
Will fax letter tomorrow once [facility] has signed it, but he previously gave a verbal OK to proceed if on plavix/asa.

## 2025-02-13 NOTE — TELEPHONE ENCOUNTER
Pt called and said Dr. Mcknight told him he could stay on the plavix, he is asking if he can be cleared for sx. Please advise

## 2025-02-14 NOTE — PROGRESS NOTES
Email sent to Dr. Salcedo concerning cardiac history. Called Dr. Lowe's office and informed them that patient having left heart cath on 2/18/25. Spoke with Amanda. She did relay the information to Dr. Lowe.

## 2025-02-14 NOTE — PROGRESS NOTES
Email received from Dr. Salcedo stating that left heart cath should be completed before the toe amputation if possible. Called and left message for Dr. Lowe with [facility].

## 2025-02-14 NOTE — PROGRESS NOTES
Call was returned by Korina from Dr. Lowe's office stating they had cardiac clearance and it was 1 toe. He would proceed. Email sent to Dr. Salcedo sharing this information.

## 2025-02-17 ENCOUNTER — HOSPITAL ENCOUNTER (OUTPATIENT)
Age: 60
Setting detail: OUTPATIENT SURGERY
Discharge: HOME | DRG: 321 | End: 2025-02-17
Attending: PODIATRIST | Admitting: PODIATRIST
Payer: MEDICARE

## 2025-02-17 ENCOUNTER — ANESTHESIA EVENT (OUTPATIENT)
Dept: OPERATING ROOM | Age: 60
End: 2025-02-17
Payer: MEDICARE

## 2025-02-17 ENCOUNTER — TELEPHONE (OUTPATIENT)
Dept: CARDIOLOGY CLINIC | Age: 60
End: 2025-02-17

## 2025-02-17 ENCOUNTER — ANESTHESIA (OUTPATIENT)
Dept: OPERATING ROOM | Age: 60
End: 2025-02-17
Payer: MEDICARE

## 2025-02-17 VITALS
OXYGEN SATURATION: 100 % | WEIGHT: 187 LBS | BODY MASS INDEX: 28.34 KG/M2 | SYSTOLIC BLOOD PRESSURE: 125 MMHG | HEART RATE: 86 BPM | DIASTOLIC BLOOD PRESSURE: 57 MMHG | RESPIRATION RATE: 19 BRPM | TEMPERATURE: 97.8 F | HEIGHT: 68 IN

## 2025-02-17 DIAGNOSIS — M86.171 ACUTE OSTEOMYELITIS OF RIGHT FOOT (HCC): ICD-10-CM

## 2025-02-17 LAB
ALBUMIN SERPL-MCNC: 3.7 G/DL (ref 3.4–5)
ALBUMIN/GLOB SERPL: 1.2 {RATIO} (ref 1.1–2.2)
ALP SERPL-CCNC: 115 U/L (ref 40–129)
ALT SERPL-CCNC: 8 U/L (ref 10–40)
ANION GAP SERPL CALCULATED.3IONS-SCNC: 15 MMOL/L (ref 3–16)
AST SERPL-CCNC: 15 U/L (ref 15–37)
BACTERIA URNS QL MICRO: ABNORMAL /HPF
BILIRUB SERPL-MCNC: 0.3 MG/DL (ref 0–1)
BILIRUB UR QL STRIP.AUTO: NEGATIVE
BUN SERPL-MCNC: 35 MG/DL (ref 7–20)
CALCIUM SERPL-MCNC: 8.8 MG/DL (ref 8.3–10.6)
CHLORIDE SERPL-SCNC: 94 MMOL/L (ref 99–110)
CLARITY UR: CLEAR
CO2 SERPL-SCNC: 28 MMOL/L (ref 21–32)
COLOR UR: YELLOW
CREAT SERPL-MCNC: 8.2 MG/DL (ref 0.9–1.3)
DEPRECATED RDW RBC AUTO: 15.9 % (ref 12.4–15.4)
EPI CELLS #/AREA URNS AUTO: 1 /HPF (ref 0–5)
GFR SERPLBLD CREATININE-BSD FMLA CKD-EPI: 7 ML/MIN/{1.73_M2}
GLUCOSE BLD-MCNC: 72 MG/DL (ref 70–99)
GLUCOSE SERPL-MCNC: 67 MG/DL (ref 70–99)
GLUCOSE UR STRIP.AUTO-MCNC: 250 MG/DL
HCT VFR BLD AUTO: 31.1 % (ref 40.5–52.5)
HGB BLD-MCNC: 10.3 G/DL (ref 13.5–17.5)
HGB UR QL STRIP.AUTO: NEGATIVE
HYALINE CASTS #/AREA URNS AUTO: 4 /LPF (ref 0–8)
KETONES UR STRIP.AUTO-MCNC: ABNORMAL MG/DL
LEUKOCYTE ESTERASE UR QL STRIP.AUTO: ABNORMAL
MCH RBC QN AUTO: 29.9 PG (ref 26–34)
MCHC RBC AUTO-ENTMCNC: 33.1 G/DL (ref 31–36)
MCV RBC AUTO: 90.2 FL (ref 80–100)
NITRITE UR QL STRIP.AUTO: NEGATIVE
PERFORMED ON: NORMAL
PH UR STRIP.AUTO: 6 [PH] (ref 5–8)
PLATELET # BLD AUTO: 176 K/UL (ref 135–450)
PMV BLD AUTO: 9.3 FL (ref 5–10.5)
POTASSIUM SERPL-SCNC: 3.1 MMOL/L (ref 3.5–5.1)
PROT SERPL-MCNC: 6.8 G/DL (ref 6.4–8.2)
PROT UR STRIP.AUTO-MCNC: 100 MG/DL
RBC # BLD AUTO: 3.45 M/UL (ref 4.2–5.9)
RBC CLUMPS #/AREA URNS AUTO: 2 /HPF (ref 0–4)
SODIUM SERPL-SCNC: 137 MMOL/L (ref 136–145)
SP GR UR STRIP.AUTO: >=1.03 (ref 1–1.03)
UA DIPSTICK W REFLEX MICRO PNL UR: YES
URN SPEC COLLECT METH UR: ABNORMAL
UROBILINOGEN UR STRIP-ACNC: 1 E.U./DL
WBC # BLD AUTO: 4.8 K/UL (ref 4–11)
WBC #/AREA URNS AUTO: 30 /HPF (ref 0–5)

## 2025-02-17 PROCEDURE — 3600000003 HC SURGERY LEVEL 3 BASE: Performed by: PODIATRIST

## 2025-02-17 PROCEDURE — 2580000003 HC RX 258: Performed by: PODIATRIST

## 2025-02-17 PROCEDURE — 7100000001 HC PACU RECOVERY - ADDTL 15 MIN: Performed by: PODIATRIST

## 2025-02-17 PROCEDURE — 80053 COMPREHEN METABOLIC PANEL: CPT

## 2025-02-17 PROCEDURE — 6360000002 HC RX W HCPCS: Performed by: NURSE ANESTHETIST, CERTIFIED REGISTERED

## 2025-02-17 PROCEDURE — 7100000011 HC PHASE II RECOVERY - ADDTL 15 MIN: Performed by: PODIATRIST

## 2025-02-17 PROCEDURE — 2500000003 HC RX 250 WO HCPCS: Performed by: PODIATRIST

## 2025-02-17 PROCEDURE — 0Y6X0Z3 DETACHMENT AT RIGHT 5TH TOE, LOW, OPEN APPROACH: ICD-10-PCS | Performed by: PODIATRIST

## 2025-02-17 PROCEDURE — 36415 COLL VENOUS BLD VENIPUNCTURE: CPT

## 2025-02-17 PROCEDURE — 2709999900 HC NON-CHARGEABLE SUPPLY: Performed by: PODIATRIST

## 2025-02-17 PROCEDURE — 6360000002 HC RX W HCPCS: Performed by: PODIATRIST

## 2025-02-17 PROCEDURE — 85027 COMPLETE CBC AUTOMATED: CPT

## 2025-02-17 PROCEDURE — 81001 URINALYSIS AUTO W/SCOPE: CPT

## 2025-02-17 PROCEDURE — 3700000000 HC ANESTHESIA ATTENDED CARE: Performed by: PODIATRIST

## 2025-02-17 PROCEDURE — 88311 DECALCIFY TISSUE: CPT

## 2025-02-17 PROCEDURE — 3600000013 HC SURGERY LEVEL 3 ADDTL 15MIN: Performed by: PODIATRIST

## 2025-02-17 PROCEDURE — 88305 TISSUE EXAM BY PATHOLOGIST: CPT

## 2025-02-17 PROCEDURE — 3700000001 HC ADD 15 MINUTES (ANESTHESIA): Performed by: PODIATRIST

## 2025-02-17 PROCEDURE — 2500000003 HC RX 250 WO HCPCS: Performed by: ANESTHESIOLOGY

## 2025-02-17 PROCEDURE — 2500000003 HC RX 250 WO HCPCS: Performed by: NURSE ANESTHETIST, CERTIFIED REGISTERED

## 2025-02-17 PROCEDURE — 7100000010 HC PHASE II RECOVERY - FIRST 15 MIN: Performed by: PODIATRIST

## 2025-02-17 PROCEDURE — 7100000000 HC PACU RECOVERY - FIRST 15 MIN: Performed by: PODIATRIST

## 2025-02-17 RX ORDER — OXYCODONE HYDROCHLORIDE 5 MG/1
5 TABLET ORAL PRN
Status: DISCONTINUED | OUTPATIENT
Start: 2025-02-17 | End: 2025-02-17 | Stop reason: HOSPADM

## 2025-02-17 RX ORDER — PROPOFOL 10 MG/ML
INJECTION, EMULSION INTRAVENOUS
Status: DISCONTINUED | OUTPATIENT
Start: 2025-02-17 | End: 2025-02-17 | Stop reason: SDUPTHER

## 2025-02-17 RX ORDER — LIDOCAINE HYDROCHLORIDE 10 MG/ML
INJECTION, SOLUTION EPIDURAL; INFILTRATION; INTRACAUDAL; PERINEURAL
Status: COMPLETED | OUTPATIENT
Start: 2025-02-17 | End: 2025-02-17

## 2025-02-17 RX ORDER — DEXTROSE MONOHYDRATE 25 G/50ML
12.5 INJECTION, SOLUTION INTRAVENOUS ONCE
Status: COMPLETED | OUTPATIENT
Start: 2025-02-17 | End: 2025-02-17

## 2025-02-17 RX ORDER — DEXMEDETOMIDINE HYDROCHLORIDE 100 UG/ML
INJECTION, SOLUTION INTRAVENOUS
Status: DISCONTINUED | OUTPATIENT
Start: 2025-02-17 | End: 2025-02-17 | Stop reason: SDUPTHER

## 2025-02-17 RX ORDER — HYDROMORPHONE HYDROCHLORIDE 2 MG/ML
0.25 INJECTION, SOLUTION INTRAMUSCULAR; INTRAVENOUS; SUBCUTANEOUS EVERY 5 MIN PRN
Status: DISCONTINUED | OUTPATIENT
Start: 2025-02-17 | End: 2025-02-17 | Stop reason: HOSPADM

## 2025-02-17 RX ORDER — SODIUM CHLORIDE 0.9 % (FLUSH) 0.9 %
5-40 SYRINGE (ML) INJECTION EVERY 12 HOURS SCHEDULED
Status: DISCONTINUED | OUTPATIENT
Start: 2025-02-17 | End: 2025-02-17 | Stop reason: HOSPADM

## 2025-02-17 RX ORDER — NALOXONE HYDROCHLORIDE 0.4 MG/ML
INJECTION, SOLUTION INTRAMUSCULAR; INTRAVENOUS; SUBCUTANEOUS PRN
Status: DISCONTINUED | OUTPATIENT
Start: 2025-02-17 | End: 2025-02-17 | Stop reason: HOSPADM

## 2025-02-17 RX ORDER — OXYCODONE HYDROCHLORIDE 5 MG/1
10 TABLET ORAL PRN
Status: DISCONTINUED | OUTPATIENT
Start: 2025-02-17 | End: 2025-02-17 | Stop reason: HOSPADM

## 2025-02-17 RX ORDER — FENTANYL CITRATE 50 UG/ML
50 INJECTION, SOLUTION INTRAMUSCULAR; INTRAVENOUS EVERY 5 MIN PRN
Status: DISCONTINUED | OUTPATIENT
Start: 2025-02-17 | End: 2025-02-17 | Stop reason: HOSPADM

## 2025-02-17 RX ORDER — SODIUM CHLORIDE 9 MG/ML
INJECTION, SOLUTION INTRAVENOUS CONTINUOUS
Status: DISCONTINUED | OUTPATIENT
Start: 2025-02-17 | End: 2025-02-17 | Stop reason: HOSPADM

## 2025-02-17 RX ORDER — ONDANSETRON 2 MG/ML
INJECTION INTRAMUSCULAR; INTRAVENOUS
Status: DISCONTINUED | OUTPATIENT
Start: 2025-02-17 | End: 2025-02-17 | Stop reason: SDUPTHER

## 2025-02-17 RX ORDER — LIDOCAINE HYDROCHLORIDE 20 MG/ML
INJECTION, SOLUTION INFILTRATION; PERINEURAL
Status: DISCONTINUED | OUTPATIENT
Start: 2025-02-17 | End: 2025-02-17 | Stop reason: SDUPTHER

## 2025-02-17 RX ORDER — SODIUM CHLORIDE 9 MG/ML
INJECTION, SOLUTION INTRAVENOUS PRN
Status: DISCONTINUED | OUTPATIENT
Start: 2025-02-17 | End: 2025-02-17 | Stop reason: HOSPADM

## 2025-02-17 RX ORDER — BUPIVACAINE HYDROCHLORIDE 5 MG/ML
INJECTION, SOLUTION EPIDURAL; INTRACAUDAL
Status: COMPLETED | OUTPATIENT
Start: 2025-02-17 | End: 2025-02-17

## 2025-02-17 RX ORDER — SODIUM CHLORIDE 0.9 % (FLUSH) 0.9 %
5-40 SYRINGE (ML) INJECTION PRN
Status: DISCONTINUED | OUTPATIENT
Start: 2025-02-17 | End: 2025-02-17 | Stop reason: HOSPADM

## 2025-02-17 RX ADMIN — CEFAZOLIN 2000 MG: 2 INJECTION, POWDER, FOR SOLUTION INTRAMUSCULAR; INTRAVENOUS at 10:50

## 2025-02-17 RX ADMIN — PROPOFOL 40 MG: 10 INJECTION, EMULSION INTRAVENOUS at 10:45

## 2025-02-17 RX ADMIN — SODIUM CHLORIDE: 9 INJECTION, SOLUTION INTRAVENOUS at 09:49

## 2025-02-17 RX ADMIN — PROPOFOL 30 MG: 10 INJECTION, EMULSION INTRAVENOUS at 10:47

## 2025-02-17 RX ADMIN — DEXMEDETOMIDINE HYDROCHLORIDE 6 MCG: 100 INJECTION, SOLUTION INTRAVENOUS at 10:48

## 2025-02-17 RX ADMIN — LIDOCAINE HYDROCHLORIDE 60 MG: 20 INJECTION, SOLUTION INFILTRATION; PERINEURAL at 10:45

## 2025-02-17 RX ADMIN — DEXTROSE MONOHYDRATE 12.5 G: 25 INJECTION, SOLUTION INTRAVENOUS at 09:50

## 2025-02-17 RX ADMIN — PROPOFOL 80 MCG/KG/MIN: 10 INJECTION, EMULSION INTRAVENOUS at 10:45

## 2025-02-17 RX ADMIN — ONDANSETRON 4 MG: 2 INJECTION, SOLUTION INTRAMUSCULAR; INTRAVENOUS at 10:50

## 2025-02-17 ASSESSMENT — PAIN - FUNCTIONAL ASSESSMENT
PAIN_FUNCTIONAL_ASSESSMENT: 0-10
PAIN_FUNCTIONAL_ASSESSMENT: NONE - DENIES PAIN
PAIN_FUNCTIONAL_ASSESSMENT: NONE - DENIES PAIN

## 2025-02-17 ASSESSMENT — PAIN DESCRIPTION - DESCRIPTORS: DESCRIPTORS: ACHING

## 2025-02-17 NOTE — DISCHARGE INSTRUCTIONS
[facility]   [address]  [place], [place] [address]  [phone]    Dr. David Lowe                                              Post Operative Instructions    1.  Have Prescriptions filled and take as directed.  All medications should be taken with food or milk.    2.  Keep foot elevated six inches above the level of the heart.  Support feet, legs, and knees with pillows.    3.  KEEP FOOT ELEVATED AS MUCH AS POSSIBLE UNTIL YOUR NEXT VISIT    4.  Place an ice pack on the bandaged site for 15 minutes every hour while awake    5.  Keep dressing clean, dry, and intact.  DO NOT REMOVE DRESSING.  CALL THE OFFICE IF BANDAGE COMES OFF.    6.  For the first 7 days after surgery, take temperature by mouth three times a day.  Call the office if greater than 101F.    7.  Ambulate with full weight bearing to the right lower extremity in a postoperative shoe at all times.     8.  All instructions are to be followed until otherwise instructed by your surgeon.    9.  Call our office if you have any concerns or questions which arise.  Our phones are answered 24 hours a day.  [phone]    10.  Your first post-operative appointment is scheduled, call the office for appointment date and time if not known prior to today.      ORTHOPEDIC/PODIATRY DISCHARGE INSTRUCTIONS    Follow your surgeons instructions.  Make follow-up appointment.  Observe operative area for signs of excessive bleeding such as a slow general ooze that saturates the dressing or bright red bleeding. In either case, apply pressure to the area and elevate if possible and call your surgeon right away.  Observe the affected extremity for circulation or nerve impairment such as a change in color, numbness, tingling, coldness or increased pain. If any of these symptoms are present call your surgeon.  Observe operative site for any signs of infection such as increased pain, redness, fever greater than 101 degrees,

## 2025-02-17 NOTE — OP NOTE
Operative Note      Patient: Te Garay  YOB: 1965  MRN: [id number]     Date of Procedure: 2/17/2025     Pre-Op Diagnosis Codes:      * Acute osteomyelitis of right foot [M86.171]     Post-Op Diagnosis: Same       Procedure(s):  FIFTH DIGIT AMPUTATION-RIGHT FOOT 95899     Surgeon(s):  David Lowe DPM     Assistant:  Natanael Rios PGY-1      Anesthesia: General        Hemostasis: anatomic dissection and electrocautery     Injectables: Pre-Op 7 cc of 1% Lidocaine plain and Post-Op 10 cc of 0.5 % Marcaine plain     Materials: 4-0 Nylon     Implants: None        Estimated Blood Loss (mL): Minimal     Complications: None     Specimens:   ID Type Source Tests Collected by Time Destination   A : A) RIGHT 5TH TOE Tissue Tissue SURGICAL PATHOLOGY David Lowe DPM 2/17/2025 1059           Implants:  None       Drains: None      Findings:  Infection Present At Time Of Surgery (PATOS) (choose all levels that have infection present):  - Organ Space infection (below fascia) present as evidenced by osteomyelitis  Other Findings: right fifth digit macerated and necrotic in nature, bone yellow and soft at the level of resection. The 5th metatarsal head appeared bright white and shiney in  nature consistent with healthy bone. No purulence encountered. 5th digit sent for permanent pathology.     Detailed Description of Procedure:     INDICATIONS FOR PROCEDURE: This patient has signs and symptoms clinically and radiographically consistent with the above mentioned preoperative diagnosis. Having failed conservative treatment, it was determined that the patient would benefit from surgical intervention. All potential risks, benefits, and complications were discussed with the patient prior to the scheduling of surgery. All the patient's questions were answered and no guarantees were given. The patient wished to proceed with surgery, and informed written consent was obtained.     DETAILS OF PROCEDURE: The  hyperemic response was noted on all aspects of the patient's right lower extremity.    END OF PROCEDURE: The patient tolerated the procedure and anesthesia well and was transported from the operating room to the PACU with vital signs stable and vascular status intact to all aspects of the patient's right lower extremity and digital capillary refill time immediate to the digits of the right foot. Following a period of post-operative monitoring, the patient will be discharged home with written and oral wound care and follow-up instructions. The patient was provided with prescriptions for preoperatively and instructed to take as directed . The patient is to follow-up with Dr. David Lowe in His private office within 5-7 days. The patient is to keep dressing clean, dry and intact at all times. The patient is to call if any complications occur.    This operative report was dictated on behalf of Dr. David Lowe, SHARA.    Natanael Rios DPM, MS    Podiatric Resident, PGY-1   [employer]   Pager Number: [phone]  2/17/2025

## 2025-02-17 NOTE — BRIEF OP NOTE
Brief Postoperative Note      Patient: Te Garay  YOB: 1965  MRN: [id number]    Date of Procedure: 2/17/2025    Pre-Op Diagnosis Codes:      * Acute osteomyelitis of right foot [M86.171]    Post-Op Diagnosis: Same       Procedure(s):  FIFTH DIGIT AMPUTATION-RIGHT FOOT 58125    Surgeon(s):  David Lowe DPM    Assistant:  Natanael Rios PGY-1     Anesthesia: General      Hemostasis: anatomic dissection and electrocautery    Injectables: Pre-Op 7 cc of 1% Lidocaine plain and Post-Op 10 cc of 0.5 % Marcaine plain    Materials: 4-0 Nylon    Implants: None       Estimated Blood Loss (mL): Minimal    Complications: None    Specimens:   ID Type Source Tests Collected by Time Destination   A : A) RIGHT 5TH TOE Tissue Tissue SURGICAL PATHOLOGY David Lowe DPM 2/17/2025 1059        Implants:  * No implants in log *      Drains: None     Findings:  Infection Present At Time Of Surgery (PATOS) (choose all levels that have infection present):  - Organ Space infection (below fascia) present as evidenced by osteomyelitis  Other Findings: right fifth digit macerated and necrotic in nature, bone yellow and soft at the level of resection. The 5th metatarsal head appeared bright white and shiney in  nature consistent with healthy bone. No purulence encountered. 5th digit sent for permanent pathology.     Natanael Rios DPM, MS    Podiatric Resident, PGY-1   [employer]   Pager Number: [phone]  2/17/2025

## 2025-02-17 NOTE — PROGRESS NOTES
Pt has Type 1 DM. Has Dexcom on left arm and Insulin pump on left abdomen. FSBS 72. Pt asymptomatic, but states that his meds sometimes mask his hypoglycemia. States if his glucose is 70 now, and he doesn't take anything, it will be down to 50 soon. Insulin pump to right abdomen with basal rate 0.95 units/hr. Pt reduced rate to 0.35 units/hr. D/W Dr. Casillas. 1/2 amp D50 administered byRasheed Villa RN as ordered. Wife at bedside.

## 2025-02-17 NOTE — ANESTHESIA PRE PROCEDURE
Department of Anesthesiology  Preprocedure Note       Name:  Te Garay   Age:  59 y.o.  :  1965                                          MRN:  [id number]         Date:  2025      Surgeon: Surgeon(s):  David Lowe DPM    Procedure: Procedure(s):  FIFTH DIGIT AMPUTATION-RIGHT FOOT    Medications prior to admission:   Prior to Admission medications    Medication Sig Start Date End Date Taking? Authorizing Provider   sacubitril-valsartan (ENTRESTO) 49-51 MG per tablet Take 1 tablet by mouth 2 times daily 2/10/25   Jaleel Koch MD   predniSONE (DELTASONE) 50 MG tablet Take one tablet 13 ; 7 ; & 1 hour prior to angiogram 2/3/25   Nathaniel Celestin MD   FIASP 100 UNIT/ML SOLN USE 30-50 UNITS DAILY VIA PUMP 25   Yves Castillo MD   torsemide (DEMADEX) 100 MG tablet Take 1 tablet by mouth daily    Yovany Zhang MD   rosuvastatin (CRESTOR) 40 MG tablet Take 1 tablet by mouth daily 25   Nathaniel Celestin MD   metoclopramide (REGLAN) 10 MG tablet Take 1 tablet by mouth 4 times daily With meals    Yovany Zhang MD   nitroGLYCERIN (NITROSTAT) 0.4 MG SL tablet Place 1 tablet under the tongue every 5 minutes as needed for Chest pain 24   Yamila Ford, JAYLEN - CNP   clopidogrel (PLAVIX) 75 MG tablet Take 1 tablet by mouth daily    Yovany Zhang MD   calcitRIOL (ROCALTROL) 0.25 MCG capsule Take 1 capsule by mouth daily    Yovany Zhang MD   ferric citrate (AURYXIA) 1  MG(Fe) TABS tablet Take by mouth 3 times daily (with meals) PRN    Yovany Zhang MD   Continuous Glucose Sensor (DEXCOM G7 SENSOR) MISC Every 10 days 10/24/24   Yves Castillo MD   ranolazine (RANEXA) 500 MG extended release tablet TAKE ONE TABLET BY MOUTH TWICE DAILY. 10/15/24   Jayla Rubio MD   carvedilol (COREG) 12.5 MG tablet Take 2 tablets by mouth 2 times daily (with meals)  Patient taking differently: Take 1 tablet by mouth 2 times daily (with meals)

## 2025-02-17 NOTE — PROGRESS NOTES
Dr. Lowe at bedside; notified the pt has ASA and Plavix this am. Dr. Lowe v/u; OK to proceed with surgery as an outpatient. To return to hospital tomorrow for coronary angiogram.

## 2025-02-17 NOTE — TELEPHONE ENCOUNTER
Catrachitarn Pt is here checking in for his procedure now.  Per Pt it was discussed that he would be staying over until Wednesday.  Pt and registation was confused as there were no note to indicate this. Please call Sachi, wife at [phone].  to discuss this matter.  Thank you

## 2025-02-17 NOTE — H&P
Podiatric Surgery Same Day Pre-op H&P Update Note  Te Garay     I have examined the patient and reviewed the original history and physical completed by Reid Lei MD on 2/11/25 and find no relevant changes.        The nature of the procedure, possible complications, alternative forms of therapy, post-op course, and post-op goals have been explained to patient/patient family.  All questions have been answered. The consent has been signed.  Patient's surgical site has been marked.       Natanael Rios DPM, MS    Podiatric Resident, PGY-1   [facility]   Pager Number: [phone]  2/17/2025

## 2025-02-17 NOTE — TELEPHONE ENCOUNTER
MARY is currently in a procedure, please let wife know that it would  be up to Dr Lowe to have pt admitted by hospitalist service if he feels it is a good idea. I do know MARY was going to admit after his LHC tomorrow for overnight observation.   Would have them discuss with surgeon for todays procedure.

## 2025-02-17 NOTE — TELEPHONE ENCOUNTER
I spoke with wife. She stated that pt is having procedure today but wants to know if pt will be kept overnight after the Cath

## 2025-02-17 NOTE — ANESTHESIA POSTPROCEDURE EVALUATION
Department of Anesthesiology  Postprocedure Note    Patient: Te Garay  MRN: [id number]  YOB: 1965  Date of evaluation: 2/17/2025    Procedure Summary       Date: 02/17/25 Room / Location: [address]    Anesthesia Start: 1041 Anesthesia Stop: 1115    Procedure: FIFTH DIGIT AMPUTATION-RIGHT FOOT (Right) Diagnosis:       Acute osteomyelitis of right foot      (Acute osteomyelitis of right foot [M86.171])    Surgeons: David Lowe DPM Responsible Provider: Raffaele Casillas DO    Anesthesia Type: MAC ASA Status: 3            Anesthesia Type: MAC    Mary Phase I: Mary Score: 10    Mary Phase II:      Anesthesia Post Evaluation    Patient location during evaluation: PACU  Patient participation: complete - patient participated  Level of consciousness: awake  Pain score: 0  Airway patency: patent  Nausea & Vomiting: no nausea and no vomiting  Cardiovascular status: blood pressure returned to baseline  Respiratory status: acceptable  Hydration status: euvolemic  Multimodal analgesia pain management approach  Pain management: adequate    No notable events documented.

## 2025-02-18 ENCOUNTER — HOSPITAL ENCOUNTER (INPATIENT)
Age: 60
LOS: 10 days | Discharge: HOME | DRG: 321 | End: 2025-02-28
Attending: INTERNAL MEDICINE | Admitting: INTERNAL MEDICINE
Payer: MEDICARE

## 2025-02-18 DIAGNOSIS — I20.0 WORSENING ANGINA (HCC): ICD-10-CM

## 2025-02-18 DIAGNOSIS — I20.0 UNSTABLE ANGINA (HCC): ICD-10-CM

## 2025-02-18 LAB
ANION GAP SERPL CALCULATED.3IONS-SCNC: 18 MMOL/L (ref 3–16)
ANTI-XA UNFRAC HEPARIN: <0.1 IU/ML (ref 0.3–0.7)
APTT BLD: 26.2 SEC (ref 22.1–36.4)
BUN SERPL-MCNC: 49 MG/DL (ref 7–20)
CALCIUM SERPL-MCNC: 9.1 MG/DL (ref 8.3–10.6)
CHLORIDE SERPL-SCNC: 90 MMOL/L (ref 99–110)
CO2 SERPL-SCNC: 26 MMOL/L (ref 21–32)
CREAT SERPL-MCNC: 8.5 MG/DL (ref 0.9–1.3)
DEPRECATED RDW RBC AUTO: 15.9 % (ref 12.4–15.4)
DEPRECATED RDW RBC AUTO: 16.1 % (ref 12.4–15.4)
ECHO BSA: 1.84 M2
GFR SERPLBLD CREATININE-BSD FMLA CKD-EPI: 7 ML/MIN/{1.73_M2}
GLUCOSE BLD-MCNC: 182 MG/DL (ref 70–99)
GLUCOSE SERPL-MCNC: 101 MG/DL (ref 70–99)
HCT VFR BLD AUTO: 30.9 % (ref 40.5–52.5)
HCT VFR BLD AUTO: 34.6 % (ref 40.5–52.5)
HGB BLD-MCNC: 10.2 G/DL (ref 13.5–17.5)
HGB BLD-MCNC: 11.5 G/DL (ref 13.5–17.5)
INR PPP: 1.03 (ref 0.85–1.15)
MCH RBC QN AUTO: 29.7 PG (ref 26–34)
MCH RBC QN AUTO: 29.7 PG (ref 26–34)
MCHC RBC AUTO-ENTMCNC: 33 G/DL (ref 31–36)
MCHC RBC AUTO-ENTMCNC: 33.1 G/DL (ref 31–36)
MCV RBC AUTO: 89.8 FL (ref 80–100)
MCV RBC AUTO: 90.2 FL (ref 80–100)
PERFORMED ON: ABNORMAL
PLATELET # BLD AUTO: 205 K/UL (ref 135–450)
PLATELET # BLD AUTO: 241 K/UL (ref 135–450)
PMV BLD AUTO: 9.5 FL (ref 5–10.5)
PMV BLD AUTO: 9.6 FL (ref 5–10.5)
POTASSIUM SERPL-SCNC: 3.6 MMOL/L (ref 3.5–5.1)
PROTHROMBIN TIME: 13.7 SEC (ref 11.9–14.9)
RBC # BLD AUTO: 3.42 M/UL (ref 4.2–5.9)
RBC # BLD AUTO: 3.86 M/UL (ref 4.2–5.9)
SODIUM SERPL-SCNC: 134 MMOL/L (ref 136–145)
WBC # BLD AUTO: 10.4 K/UL (ref 4–11)
WBC # BLD AUTO: 10.6 K/UL (ref 4–11)

## 2025-02-18 PROCEDURE — 2060000000 HC ICU INTERMEDIATE R&B

## 2025-02-18 PROCEDURE — 2709999900 HC NON-CHARGEABLE SUPPLY: Performed by: INTERNAL MEDICINE

## 2025-02-18 PROCEDURE — 36415 COLL VENOUS BLD VENIPUNCTURE: CPT

## 2025-02-18 PROCEDURE — 6360000002 HC RX W HCPCS: Performed by: NURSE PRACTITIONER

## 2025-02-18 PROCEDURE — 85730 THROMBOPLASTIN TIME PARTIAL: CPT

## 2025-02-18 PROCEDURE — 7100000011 HC PHASE II RECOVERY - ADDTL 15 MIN: Performed by: INTERNAL MEDICINE

## 2025-02-18 PROCEDURE — G0378 HOSPITAL OBSERVATION PER HR: HCPCS

## 2025-02-18 PROCEDURE — B2111ZZ FLUOROSCOPY OF MULTIPLE CORONARY ARTERIES USING LOW OSMOLAR CONTRAST: ICD-10-PCS | Performed by: INTERNAL MEDICINE

## 2025-02-18 PROCEDURE — 6360000002 HC RX W HCPCS: Performed by: INTERNAL MEDICINE

## 2025-02-18 PROCEDURE — 93005 ELECTROCARDIOGRAM TRACING: CPT | Performed by: INTERNAL MEDICINE

## 2025-02-18 PROCEDURE — C1769 GUIDE WIRE: HCPCS | Performed by: INTERNAL MEDICINE

## 2025-02-18 PROCEDURE — 85610 PROTHROMBIN TIME: CPT

## 2025-02-18 PROCEDURE — 99153 MOD SED SAME PHYS/QHP EA: CPT | Performed by: INTERNAL MEDICINE

## 2025-02-18 PROCEDURE — 85520 HEPARIN ASSAY: CPT

## 2025-02-18 PROCEDURE — 80048 BASIC METABOLIC PNL TOTAL CA: CPT

## 2025-02-18 PROCEDURE — 1200000000 HC SEMI PRIVATE

## 2025-02-18 PROCEDURE — C1894 INTRO/SHEATH, NON-LASER: HCPCS | Performed by: INTERNAL MEDICINE

## 2025-02-18 PROCEDURE — 93459 L HRT ART/GRFT ANGIO: CPT | Performed by: INTERNAL MEDICINE

## 2025-02-18 PROCEDURE — 99152 MOD SED SAME PHYS/QHP 5/>YRS: CPT | Performed by: INTERNAL MEDICINE

## 2025-02-18 PROCEDURE — C1760 CLOSURE DEV, VASC: HCPCS | Performed by: INTERNAL MEDICINE

## 2025-02-18 PROCEDURE — 7100000010 HC PHASE II RECOVERY - FIRST 15 MIN: Performed by: INTERNAL MEDICINE

## 2025-02-18 PROCEDURE — 6360000004 HC RX CONTRAST MEDICATION: Performed by: INTERNAL MEDICINE

## 2025-02-18 PROCEDURE — 85027 COMPLETE CBC AUTOMATED: CPT

## 2025-02-18 PROCEDURE — 2500000003 HC RX 250 WO HCPCS: Performed by: INTERNAL MEDICINE

## 2025-02-18 PROCEDURE — 4A023N7 MEASUREMENT OF CARDIAC SAMPLING AND PRESSURE, LEFT HEART, PERCUTANEOUS APPROACH: ICD-10-PCS | Performed by: INTERNAL MEDICINE

## 2025-02-18 PROCEDURE — 6370000000 HC RX 637 (ALT 250 FOR IP): Performed by: INTERNAL MEDICINE

## 2025-02-18 RX ORDER — SACUBITRIL AND VALSARTAN 49; 51 MG/1; MG/1
1 TABLET, FILM COATED ORAL 2 TIMES DAILY
Status: DISCONTINUED | OUTPATIENT
Start: 2025-02-18 | End: 2025-02-28 | Stop reason: HOSPADM

## 2025-02-18 RX ORDER — METOPROLOL TARTRATE 1 MG/ML
INJECTION, SOLUTION INTRAVENOUS PRN
Status: DISCONTINUED | OUTPATIENT
Start: 2025-02-18 | End: 2025-02-18 | Stop reason: HOSPADM

## 2025-02-18 RX ORDER — LEVETIRACETAM 500 MG/1
500 TABLET ORAL DAILY
Status: DISCONTINUED | OUTPATIENT
Start: 2025-02-19 | End: 2025-02-28 | Stop reason: HOSPADM

## 2025-02-18 RX ORDER — ONDANSETRON 2 MG/ML
4 INJECTION INTRAMUSCULAR; INTRAVENOUS EVERY 6 HOURS PRN
Status: DISCONTINUED | OUTPATIENT
Start: 2025-02-18 | End: 2025-02-28 | Stop reason: HOSPADM

## 2025-02-18 RX ORDER — HEPARIN SODIUM 10000 [USP'U]/100ML
5-30 INJECTION, SOLUTION INTRAVENOUS CONTINUOUS
Status: DISCONTINUED | OUTPATIENT
Start: 2025-02-18 | End: 2025-02-24

## 2025-02-18 RX ORDER — IOPAMIDOL 755 MG/ML
INJECTION, SOLUTION INTRAVASCULAR PRN
Status: DISCONTINUED | OUTPATIENT
Start: 2025-02-18 | End: 2025-02-18 | Stop reason: HOSPADM

## 2025-02-18 RX ORDER — HEPARIN SODIUM 1000 [USP'U]/ML
4000 INJECTION, SOLUTION INTRAVENOUS; SUBCUTANEOUS ONCE
Status: COMPLETED | OUTPATIENT
Start: 2025-02-18 | End: 2025-02-18

## 2025-02-18 RX ORDER — SODIUM CHLORIDE 9 MG/ML
INJECTION, SOLUTION INTRAVENOUS PRN
Status: DISCONTINUED | OUTPATIENT
Start: 2025-02-18 | End: 2025-02-28 | Stop reason: HOSPADM

## 2025-02-18 RX ORDER — ACETAMINOPHEN 325 MG/1
650 TABLET ORAL EVERY 4 HOURS PRN
Status: DISCONTINUED | OUTPATIENT
Start: 2025-02-18 | End: 2025-02-27

## 2025-02-18 RX ORDER — METOCLOPRAMIDE 10 MG/1
10 TABLET ORAL 4 TIMES DAILY
Status: DISCONTINUED | OUTPATIENT
Start: 2025-02-18 | End: 2025-02-27

## 2025-02-18 RX ORDER — DEXTROSE MONOHYDRATE 100 MG/ML
INJECTION, SOLUTION INTRAVENOUS CONTINUOUS PRN
Status: DISCONTINUED | OUTPATIENT
Start: 2025-02-18 | End: 2025-02-28 | Stop reason: HOSPADM

## 2025-02-18 RX ORDER — SODIUM CHLORIDE 0.9 % (FLUSH) 0.9 %
5-40 SYRINGE (ML) INJECTION PRN
Status: DISCONTINUED | OUTPATIENT
Start: 2025-02-18 | End: 2025-02-28 | Stop reason: HOSPADM

## 2025-02-18 RX ORDER — CALCITRIOL 0.25 UG/1
0.25 CAPSULE, LIQUID FILLED ORAL DAILY
Status: DISCONTINUED | OUTPATIENT
Start: 2025-02-19 | End: 2025-02-28 | Stop reason: HOSPADM

## 2025-02-18 RX ORDER — ERGOCALCIFEROL 1.25 MG/1
50000 CAPSULE, LIQUID FILLED ORAL WEEKLY
Status: DISCONTINUED | OUTPATIENT
Start: 2025-02-22 | End: 2025-02-28 | Stop reason: HOSPADM

## 2025-02-18 RX ORDER — HEPARIN SODIUM 1000 [USP'U]/ML
4000 INJECTION, SOLUTION INTRAVENOUS; SUBCUTANEOUS PRN
Status: DISCONTINUED | OUTPATIENT
Start: 2025-02-18 | End: 2025-02-24

## 2025-02-18 RX ORDER — HYDROMORPHONE HYDROCHLORIDE 1 MG/ML
1 INJECTION, SOLUTION INTRAMUSCULAR; INTRAVENOUS; SUBCUTANEOUS EVERY 4 HOURS PRN
Status: DISCONTINUED | OUTPATIENT
Start: 2025-02-18 | End: 2025-02-28 | Stop reason: HOSPADM

## 2025-02-18 RX ORDER — SODIUM CHLORIDE 0.9 % (FLUSH) 0.9 %
5-40 SYRINGE (ML) INJECTION EVERY 12 HOURS SCHEDULED
Status: DISCONTINUED | OUTPATIENT
Start: 2025-02-18 | End: 2025-02-18 | Stop reason: HOSPADM

## 2025-02-18 RX ORDER — GLUCAGON 1 MG/ML
1 KIT INJECTION PRN
Status: DISCONTINUED | OUTPATIENT
Start: 2025-02-18 | End: 2025-02-28 | Stop reason: HOSPADM

## 2025-02-18 RX ORDER — SODIUM CHLORIDE 0.9 % (FLUSH) 0.9 %
5-40 SYRINGE (ML) INJECTION PRN
Status: DISCONTINUED | OUTPATIENT
Start: 2025-02-18 | End: 2025-02-18 | Stop reason: HOSPADM

## 2025-02-18 RX ORDER — CARVEDILOL 6.25 MG/1
12.5 TABLET ORAL 2 TIMES DAILY WITH MEALS
Status: DISCONTINUED | OUTPATIENT
Start: 2025-02-18 | End: 2025-02-28 | Stop reason: HOSPADM

## 2025-02-18 RX ORDER — SODIUM CHLORIDE, SODIUM LACTATE, CALCIUM CHLORIDE, MAGNESIUM CHLORIDE AND DEXTROSE 2.5; 538; 448; 18.3; 5.08 G/100ML; MG/100ML; MG/100ML; MG/100ML; MG/100ML
6000 INJECTION, SOLUTION INTRAPERITONEAL EVERY 6 HOURS
Status: DISCONTINUED | OUTPATIENT
Start: 2025-02-18 | End: 2025-02-19

## 2025-02-18 RX ORDER — HEPARIN SODIUM 1000 [USP'U]/ML
2000 INJECTION, SOLUTION INTRAVENOUS; SUBCUTANEOUS PRN
Status: DISCONTINUED | OUTPATIENT
Start: 2025-02-18 | End: 2025-02-24

## 2025-02-18 RX ORDER — RANOLAZINE 500 MG/1
500 TABLET, EXTENDED RELEASE ORAL 2 TIMES DAILY
Status: DISCONTINUED | OUTPATIENT
Start: 2025-02-18 | End: 2025-02-28 | Stop reason: HOSPADM

## 2025-02-18 RX ORDER — MIDAZOLAM HYDROCHLORIDE 1 MG/ML
INJECTION, SOLUTION INTRAMUSCULAR; INTRAVENOUS PRN
Status: DISCONTINUED | OUTPATIENT
Start: 2025-02-18 | End: 2025-02-18 | Stop reason: HOSPADM

## 2025-02-18 RX ORDER — SODIUM CHLORIDE 0.9 % (FLUSH) 0.9 %
5-40 SYRINGE (ML) INJECTION EVERY 12 HOURS SCHEDULED
Status: DISCONTINUED | OUTPATIENT
Start: 2025-02-18 | End: 2025-02-28 | Stop reason: HOSPADM

## 2025-02-18 RX ORDER — CLOPIDOGREL BISULFATE 75 MG/1
75 TABLET ORAL DAILY
Status: DISCONTINUED | OUTPATIENT
Start: 2025-02-19 | End: 2025-02-28 | Stop reason: HOSPADM

## 2025-02-18 RX ORDER — FENTANYL CITRATE 50 UG/ML
INJECTION, SOLUTION INTRAMUSCULAR; INTRAVENOUS PRN
Status: DISCONTINUED | OUTPATIENT
Start: 2025-02-18 | End: 2025-02-18 | Stop reason: HOSPADM

## 2025-02-18 RX ORDER — TORSEMIDE 100 MG/1
100 TABLET ORAL DAILY
Status: DISCONTINUED | OUTPATIENT
Start: 2025-02-19 | End: 2025-02-28 | Stop reason: HOSPADM

## 2025-02-18 RX ORDER — SODIUM CHLORIDE, SODIUM LACTATE, CALCIUM CHLORIDE, MAGNESIUM CHLORIDE AND DEXTROSE 4.25; 538; 448; 18.3; 5.08 G/100ML; MG/100ML; MG/100ML; MG/100ML; MG/100ML
6000 INJECTION, SOLUTION INTRAPERITONEAL CONTINUOUS
Status: DISCONTINUED | OUTPATIENT
Start: 2025-02-18 | End: 2025-02-19

## 2025-02-18 RX ORDER — NITROGLYCERIN 0.4 MG/1
0.4 TABLET SUBLINGUAL EVERY 5 MIN PRN
Status: DISCONTINUED | OUTPATIENT
Start: 2025-02-18 | End: 2025-02-28 | Stop reason: HOSPADM

## 2025-02-18 RX ORDER — ISOSORBIDE MONONITRATE 30 MG/1
30 TABLET, EXTENDED RELEASE ORAL NIGHTLY
Status: DISCONTINUED | OUTPATIENT
Start: 2025-02-18 | End: 2025-02-26

## 2025-02-18 RX ORDER — ROSUVASTATIN CALCIUM 20 MG/1
40 TABLET, COATED ORAL NIGHTLY
Status: DISCONTINUED | OUTPATIENT
Start: 2025-02-18 | End: 2025-02-28 | Stop reason: HOSPADM

## 2025-02-18 RX ORDER — ASPIRIN 81 MG/1
81 TABLET ORAL DAILY
Status: DISCONTINUED | OUTPATIENT
Start: 2025-02-19 | End: 2025-02-28 | Stop reason: HOSPADM

## 2025-02-18 RX ORDER — SODIUM CHLORIDE 9 MG/ML
INJECTION, SOLUTION INTRAVENOUS PRN
Status: DISCONTINUED | OUTPATIENT
Start: 2025-02-18 | End: 2025-02-18 | Stop reason: HOSPADM

## 2025-02-18 RX ADMIN — ROSUVASTATIN 40 MG: 20 TABLET, FILM COATED ORAL at 21:25

## 2025-02-18 RX ADMIN — RANOLAZINE 500 MG: 500 TABLET, EXTENDED RELEASE ORAL at 21:25

## 2025-02-18 RX ADMIN — METOCLOPRAMIDE HYDROCHLORIDE 10 MG: 10 TABLET ORAL at 21:25

## 2025-02-18 RX ADMIN — HYDROMORPHONE HYDROCHLORIDE 1 MG: 1 INJECTION, SOLUTION INTRAMUSCULAR; INTRAVENOUS; SUBCUTANEOUS at 12:34

## 2025-02-18 RX ADMIN — HYDROMORPHONE HYDROCHLORIDE 1 MG: 1 INJECTION, SOLUTION INTRAMUSCULAR; INTRAVENOUS; SUBCUTANEOUS at 17:20

## 2025-02-18 RX ADMIN — SERTRALINE HYDROCHLORIDE 50 MG: 50 TABLET ORAL at 21:25

## 2025-02-18 RX ADMIN — ISOSORBIDE MONONITRATE 30 MG: 30 TABLET, EXTENDED RELEASE ORAL at 21:25

## 2025-02-18 RX ADMIN — HEPARIN SODIUM 11 UNITS/KG/HR: 10000 INJECTION, SOLUTION INTRAVENOUS at 20:01

## 2025-02-18 RX ADMIN — CARVEDILOL 12.5 MG: 6.25 TABLET, FILM COATED ORAL at 17:19

## 2025-02-18 RX ADMIN — METOCLOPRAMIDE HYDROCHLORIDE 10 MG: 10 TABLET ORAL at 17:20

## 2025-02-18 RX ADMIN — HYDROMORPHONE HYDROCHLORIDE 1 MG: 1 INJECTION, SOLUTION INTRAMUSCULAR; INTRAVENOUS; SUBCUTANEOUS at 21:43

## 2025-02-18 RX ADMIN — ONDANSETRON 4 MG: 2 INJECTION INTRAMUSCULAR; INTRAVENOUS at 21:43

## 2025-02-18 RX ADMIN — HEPARIN SODIUM 4000 UNITS: 1000 INJECTION INTRAVENOUS; SUBCUTANEOUS at 20:01

## 2025-02-18 RX ADMIN — SODIUM CHLORIDE, PRESERVATIVE FREE 10 ML: 5 INJECTION INTRAVENOUS at 17:22

## 2025-02-18 RX ADMIN — SACUBITRIL AND VALSARTAN 1 TABLET: 49; 51 TABLET, FILM COATED ORAL at 21:25

## 2025-02-18 ASSESSMENT — PAIN DESCRIPTION - INTENSITY: RATING_2: 2

## 2025-02-18 ASSESSMENT — PAIN DESCRIPTION - DESCRIPTORS
DESCRIPTORS: DISCOMFORT
DESCRIPTORS: ACHING

## 2025-02-18 ASSESSMENT — PAIN DESCRIPTION - LOCATION
LOCATION: CHEST
LOCATION_2: FOOT
LOCATION: CHEST;LEG
LOCATION: CHEST
LOCATION_2: FOOT

## 2025-02-18 ASSESSMENT — PAIN SCALES - GENERAL
PAINLEVEL_OUTOF10: 8
PAINLEVEL_OUTOF10: 8
PAINLEVEL_OUTOF10: 5
PAINLEVEL_OUTOF10: 8
PAINLEVEL_OUTOF10: 3

## 2025-02-18 ASSESSMENT — PAIN DESCRIPTION - ORIENTATION
ORIENTATION: LEFT;MID
ORIENTATION_2: RIGHT

## 2025-02-18 NOTE — PROGRESS NOTES
Pt calling out reporting chest pain unchanged. Pt reports pain is in center of chest, radiating down left arm and up neck. EKG and vitals unchanged and stable.  Pt states, \"this isn't cardiac related. It is because of my blood sugar and they gave me 3L of fluids yesterday.\" Pt with additional pain medication ordered as tylenol; offered and pt states he doesn't want any additional medication, to just \"let the doctor know.\" Dr. Celestin notified.

## 2025-02-18 NOTE — ACP (ADVANCE CARE PLANNING)
Advance Care Planning     Advance Care Planning Inpatient Note  [facility] Department    Today's Date: 2/18/2025  [facility]    Received request from IDT Member.  Upon review of chart and communication with care team, patient's decision making abilities are not in question.. Patient was/were present in the room during visit.    Goals of ACP Conversation:  Discuss advance care planning documents    Health Care Decision Makers:       Primary Decision Maker: TanishaSachi - Spouse - [phone]    Secondary Decision Maker: TanishaKendell - Child - [phone]  Summary:  Completed New Documents  Updated Healthcare Decision Maker    Advance Care Planning Documents (Patient Wishes):  Healthcare Power of /Advance Directive Appointment of Health Care Agent     Assessment:  Pt participated in ACP education and understood documents    Interventions:  Provided education on documents for clarity and greater understanding  Assisted in the completion of documents according to patient's wishes at this time    Care Preferences Communicated:   No    Outcomes/Plan:  ACP Discussion: Completed  New advance directive completed.  Returned original document(s) to patient, as well as copies for distribution to appointed agents  Copy of advance directive given to staff to scan into medical record.    Electronically signed by Chaplain Netta on 2/18/2025 at 5:40 PM

## 2025-02-18 NOTE — PRE SEDATION
Brief Pre-Op Note/Sedation Assessment      Te Garay  1965  [id number]  9:38 AM    Planned Procedure: Cardiac Catheterization Procedure  Post Procedure Plan: Return to same level of care  Consent: I have discussed with the patient and/or the patient representative the indication, alternatives, and the possible risks and/or complications of the planned procedure and the anesthesia methods. The patient and/or patient representative appear to understand and agree to proceed.        Chief Complaint:   Anginal Equivalent      Indications for Cath Procedure:  Presentation:  Worsening Angina  2.  Anginal Classification within 2 weeks:  CCS III - Symptoms with everyday living activities, i.e., moderate limitation  3.  Angina Symptoms Assessment:  Typical Chest Pain  4.  Heart Failure Class within last 2 weeks:  No symptoms  5.  Cardiovascular Instability:  No    Prior Ischemic Workup/Eval:  Pre-Procedural Medications: Yes: ACE/ARB/ARNI, Aspirin, Beta Blockers, Long Acting Nitrates (Any), and STATIN  2.   Stress Test Completed?  Yes:  Stress or Imaging Studies Performed (within ANY time period):   Type:  Stress Nuclear  Results:  Positive:  Myocardial Perfusion Defects (Nuclear) Extent of Ischemia:  Intermediate    Does Patient need surgery?  Cath Valve Surgery:  No    Pre-Procedure Medical History:  Vital Signs:  /62   Pulse 84   Resp 16   Ht 1.727 m (5' 8\")   Wt 70.8 kg (156 lb)   SpO2 100%   BMI 23.72 kg/m²     Allergies:    Allergies   Allergen Reactions    Iodides Anaphylaxis    Shellfish-Derived Products Anaphylaxis    Morphine Hives and Itching    Lipitor [Atorvastatin] Rash     Rash on head and neck     Medications:    Current Facility-Administered Medications   Medication Dose Route Frequency Provider Last Rate Last Admin    sodium chloride flush 0.9 % injection 5-40 mL  5-40 mL IntraVENous 2 times per day Nathaniel Celestin MD        sodium chloride flush 0.9 % injection 5-40 mL  5-40 mL

## 2025-02-18 NOTE — PLAN OF CARE
Problem: Chronic Conditions and Co-morbidities  Goal: Patient's chronic conditions and co-morbidity symptoms are monitored and maintained or improved  Outcome: Progressing     Problem: Discharge Planning  Goal: Discharge to home or other facility with appropriate resources  Outcome: Progressing     Problem: Pain  Goal: Verbalizes/displays adequate comfort level or baseline comfort level  Outcome: Progressing     Problem: Safety - Adult  Goal: Free from fall injury  Outcome: Progressing     Problem: ABCDS Injury Assessment  Goal: Absence of physical injury  Outcome: Progressing     Problem: Cardiovascular - Adult  Goal: Maintains optimal cardiac output and hemodynamic stability  Outcome: Progressing  Goal: Absence of cardiac dysrhythmias or at baseline  Outcome: Progressing     Problem: Skin/Tissue Integrity - Adult  Goal: Skin integrity remains intact  Outcome: Progressing     Problem: Musculoskeletal - Adult  Goal: Return mobility to safest level of function  Outcome: Progressing     Problem: Infection - Adult  Goal: Absence of infection at discharge  Outcome: Progressing     Problem: Metabolic/Fluid and Electrolytes - Adult  Goal: Electrolytes maintained within normal limits  Outcome: Progressing  Goal: Hemodynamic stability and optimal renal function maintained  Outcome: Progressing  Goal: Glucose maintained within prescribed range  Outcome: Progressing     Problem: Hematologic - Adult  Goal: Maintains hematologic stability  Outcome: Progressing

## 2025-02-18 NOTE — H&P
per tablet Take 1 tablet by mouth 2 times daily Yes Jaleel Koch MD   predniSONE (DELTASONE) 50 MG tablet Take one tablet 13 ; 7 ; & 1 hour prior to angiogram Yes Nathaniel Celestin MD   FIASP 100 UNIT/ML SOLN USE 30-50 UNITS DAILY VIA PUMP Yes Yves Castillo MD   torsemide (DEMADEX) 100 MG tablet Take 1 tablet by mouth daily Yes Yovany Zhang MD   rosuvastatin (CRESTOR) 40 MG tablet Take 1 tablet by mouth daily Yes Nathaniel Celestin MD   metoclopramide (REGLAN) 10 MG tablet Take 1 tablet by mouth 4 times daily With meals Yes Yovany Zhang MD   nitroGLYCERIN (NITROSTAT) 0.4 MG SL tablet Place 1 tablet under the tongue every 5 minutes as needed for Chest pain Yes Yamila Ford APRN - CNP   clopidogrel (PLAVIX) 75 MG tablet Take 1 tablet by mouth daily Yes Yovany Zhang MD   calcitRIOL (ROCALTROL) 0.25 MCG capsule Take 1 capsule by mouth daily Yes Yovany Zhang MD   ferric citrate (AURYXIA) 1  MG(Fe) TABS tablet Take by mouth 3 times daily (with meals) PRN Yes Yovany Zhang MD   Continuous Glucose Sensor (DEXCOM G7 SENSOR) MISC Every 10 days Yes Yves Castillo MD   ranolazine (RANEXA) 500 MG extended release tablet TAKE ONE TABLET BY MOUTH TWICE DAILY. Yes Jayla Rubio MD   carvedilol (COREG) 12.5 MG tablet Take 2 tablets by mouth 2 times daily (with meals)  Patient taking differently: Take 1 tablet by mouth 2 times daily (with meals) Yes Jayla Rubio MD   sertraline (ZOLOFT) 50 MG tablet Take 1 tablet by mouth daily  Patient taking differently: Take 1 tablet by mouth nightly Yes Jayla Rubio MD   isosorbide mononitrate (IMDUR) 30 MG extended release tablet TAKE 1 TABLET BY MOUTH DAILY  Patient taking differently: Take 1 tablet by mouth at bedtime Yes Yamila Ford APRN - CNP   aspirin 81 MG EC tablet Take 1 tablet by mouth daily Yes Vega Blanchard DO   levETIRAcetam (KEPPRA) 500 MG tablet Take 1 tablet by mouth daily Yes Santo  circumflex is occluded.  Left anterior descending artery is occluded.  2.  Saphenous vein graft to obtuse marginal 2 is patent.  3.  Left internal mammary artery, left anterior descending artery is patent andprovides blood to the diagonal branches.  4.  Right coronary artery comes from right coronary cusp.  The posterolateral branch has 90% stenosis present.  Rest of the vessel is patent.  5.  LVEDP was 15 mmHg.  There was no aortic valve gradient     SUMMARY:  1.  Patent left internal mammary artery to left anterior descending artery  2.  Patent saphenous vein graft to obtuse marginal 2  3.  Non-occluded saphenous vein graft to the obtuse marginal.  4.  Right coronary artery in the posterolateral branch with 90% stenosis present.  5.  Occluded left anterior descending artery in the proximal segment  6.  Occluded mid left circumflex.     RECOMMENDATIONS:  1.  Continue postoperative care.  2.  Watch for bleeding.  3.  Aggressive risk factor modification.  4.  Continue aggressive antianginal therapy.  5.  If the patient remains symptomatic, we can consider intervention of right posterolateral branch.    Cardiac cath 6/5/13 ([facility])  Obstructive 3 vessel coronary artery disease with a dominant left circulation  Patent LIMA to mid distal LAD  Occluded distal saphenous vein graft segment to second marginal branch of the dominant left circumflex  Elevated LVEDP 30 MM Hg  Successful primary PCI with placement of a PROMUS Element 2.75x24 mm drug-eluting stent to occluded SVG segment to OM2  Door to balloon time 76 minutes    ASSESSMENT/PLAN:  Coronary artery disease  - h/o CABG X3 2001  - [facility] 2013 @ [facility] 2017 patent LIMA -LAD, patent SVG to OM2, nonoccluded SVG to OM. RCA in posterolateral branc with 90% stenosis, occluded prox LAD, occluded mid LCx. Continue medical management   - [facility] 2021 Patent SVG to OM, patent LIMA to LAD, s/p MARIIA to SVG to OM1. Small RV marginal branch (mid 70%, distal 90%) could be stented if

## 2025-02-18 NOTE — BRIEF OP NOTE
Brief Postoperative Note      Patient: Te Garay  YOB: 1965  MRN: [id number]    Date of Procedure: 2/18/2025    Pre-Op Diagnosis Codes:      * Unstable angina (HCC) [I20.0]    Post-Op Diagnosis:   1. LM-40% distal  2. LAD-100% ostial  3. Cx-100% mid  4. RCA-nondominant, small caliber vessel  5. RV marginal-70% mid, 90% distal, small caliber vessel, collaterals to distal circumflex  6. LIMA-LAD: Widely patent. Post anastomotic LAD supplying collaterals to the circumflex  7. SVG-OM2: 70% proximal, in-stent, 99% mid, in-stent  8., LV gram not done due to elevated LVEDP. LVEDP 36 mmHg  9. Will need further discussion and evaluation for treatment options of restenosed stent and SVG-OM2        Procedure(s):  Left heart cath w coronary bypass graft    Surgeon(s):  Nathaniel Celestin MD    Anesthesia: IV Sedation    Estimated Blood Loss (mL): Minimal    Complications: None    Other: Patient developed worsening chest and abdominal discomfort when laying flat.  He did receive 3 L of fluid during his operative procedure yesterday 2/17/2025.  In the past, volume overload was a significant contributor to his chest discomfort.  He will need peritoneal dialysis for volume removal.  Further discussion for options of revascularization of SVG-OM 2 are in progress.    Electronically signed by Nathaniel Celestin MD on 2/18/2025 at 11:35 AM

## 2025-02-18 NOTE — H&P
HOSPITALISTS HISTORY AND PHYSICAL    2/18/2025 6:20 PM    Patient Information:  DIDIER RUVALCABA is a 59 y.o. male [id number]  PCP:  Reid Lei MD (Tel: [phone] )    Chief complaint:  Chest pain    History of Present Illness:  Didier Ruvalcaba is a 59 y.o. male with history of CAD s/p CABG, DM 1, ESRD on PD, chronic combined CHF, PVD with R 5th toe gangrene underwent elective LHC by Dr Celestin today.    LHC today:    1. LM-40% distal  2. LAD-100% ostial  3. Cx-100% mid  4. RCA-nondominant, small caliber vessel  5. RV marginal-70% mid, 90% distal, small caliber vessel, collaterals to distal circumflex  6. LIMA-LAD: Widely patent. Post anastomotic LAD supplying collaterals to the circumflex  7. SVG-OM2: 70% proximal, in-stent, 99% mid, in-stent  8., LV gram not done due to elevated LVEDP. LVEDP 36 mmHg  9. Will need further discussion and evaluation for treatment options of restenosed stent and SVG-OM2     Patient has improvement in CP with NTG.  Has been progressive in nature.  Some dysphagia and abdominal pain.  Has never had EGD.  Denies prior gastric emptying study.  No melena or hematochezia.  No vomiting.  No fevers, chills or NS.  Follows with Podiatry as outpatient.  Otherwise complete ROS is negative unless listed above.    REVIEW OF SYSTEMS:   Pertinent positives as noted in HPI.  All other systems were reviewed and are negative.      Past Medical History:   has a past medical history of Cardiomyopathy (HCC), Carotid artery stenosis, CHF (congestive heart failure) (HCC), Coronary artery disease, De Quervain thyroiditis, ESRD (end stage renal disease) (HCC), HFrEF (heart failure with reduced ejection fraction) (HCC), Hyperlipidemia, Hyperparathyroidism (secondary), Hypertension, KATIE (obstructive sleep apnea), Overweight, Peripheral neuropathy, Poorly controlled type 1 diabetes mellitus (HCC), PVD  tablets by mouth 2 times daily (with meals) (Patient taking differently: Take 1 tablet by mouth 2 times daily (with meals)) 30 tablet 0    sertraline (ZOLOFT) 50 MG tablet Take 1 tablet by mouth daily (Patient taking differently: Take 1 tablet by mouth nightly) 30 tablet 3    vitamin D (ERGOCALCIFEROL) 1.25 MG (84376 UT) CAPS capsule Take 1 capsule by mouth once a week      isosorbide mononitrate (IMDUR) 30 MG extended release tablet TAKE 1 TABLET BY MOUTH DAILY (Patient taking differently: Take 1 tablet by mouth at bedtime) 90 tablet 2    aspirin 81 MG EC tablet Take 1 tablet by mouth daily 90 tablet 0    levETIRAcetam (KEPPRA) 500 MG tablet Take 1 tablet by mouth daily 60 tablet 3    insulin lispro (HUMALOG) 100 UNIT/ML SOLN injection vial Use 30-40 units daily via pump 20 mL 2       Allergies:  Allergies   Allergen Reactions    Iodides Anaphylaxis    Shellfish-Derived Products Anaphylaxis    Morphine Hives and Itching    Lipitor [Atorvastatin] Rash     Rash on head and neck        Social History:  Patient Lives with family   reports that he has never smoked. He has never used smokeless tobacco. He reports that he does not currently use alcohol. He reports that he does not use drugs.     Family History:  family history includes Arthritis in his brother; Coronary Art Dis in his father and paternal grandmother; Diabetes in his father and paternal grandmother; Heart Murmur in his sister; Hypertension in his mother; Mult Sclerosis in his brother; Seizures in his mother; Sleep Apnea in his brother; Thyroid Disease in his sister.     Physical Exam:  /72   Pulse 90   Temp 97.4 °F (36.3 °C) (Temporal)   Resp 18   Ht 1.727 m (5' 8\")   Wt 87.1 kg (192 lb 1.6 oz)   SpO2 99%   BMI 29.21 kg/m²     General appearance:  Appears comfortable. Well nourished  Eyes: Sclera clear, pupils equal  ENT: Moist mucus membranes, no thrush. Trachea midline.  Cardiovascular: Regular rhythm, normal S1, S2. No murmur, gallop, rub.

## 2025-02-18 NOTE — DISCHARGE INSTRUCTIONS
LEFT HEART CATHETERIZATION    Care of your puncture site:  Remove bandage 24 hours after the procedure.  May shower in 24 hours but do not sit in a bathtub/pool of water for 5 days or until the wound is healed.  Inspect the site daily and gently clean using soap and water while standing in the shower.  Dry thoroughly and apply a Band-Aid that covers the entire site. Do not apply powder or lotion.    Normal Observations:  Soreness or tenderness which may last one week.  Mild oozing from the incision site.  Possible bruising that could last 2 weeks.    Activity:  You may resume driving 24 hours following the procedure.  You may resume normal activity in 5 days or after the wound heals.  Avoid lifting more than 10 pounds for 5 days or until the wound heals.  Avoid strenuous exercise or activity for 1 week.      Nutrition:  Regular diet  Drink at least 8 to 10 glasses of decaffeinated, non-alcoholic fluid for the next 24 hours to flush the x-ray dye used for your angiogram out of your body.    Call your doctor immediately if your condition worsens, for any other concerns, for a follow-up appointment or if you experience any of the following:  Significant bleeding that does not stop after 10 minutes of applying firm pressure on the puncture site.  Increased swelling on the groin or leg.  Unusual pain, numbness, or tingling of the groin or down the leg.  Any signs of infection such as: redness, yellow drainage at the site, swelling or pain.        Appointment scheduled 3/3 at 9:30AM with Dr. Renee. [facility], off [facility].  Bonny (his nurse) direct number [phone]. Can call her with any questions.   .

## 2025-02-18 NOTE — PROGRESS NOTES
Pt admitted to room 5911 from ED. VSS on room air. O x 4. Pt lives home and had outpt [facility]. . Pt is standby asst.   Pt oriented to room and updated on POC. Pt understands and has no further questions. Call light and bedside table within reach. Safety socks on and  bed in lowest position with 2/4 siderails up. bed alarm on.Report from cath nurse. Telemetry verified    [facility] right femoral site CDI, unable to assess pedal pulse b/c/o/ ace wrap on foot. . Restrictions completed in cath lab

## 2025-02-18 NOTE — ACP (ADVANCE CARE PLANNING)
Advanced Care Planning Note.    Purpose of Encounter: Advanced care planning in light of unstable angina  Parties In Attendance: Patient  Decisional Capacity: Yes  Subjective: Patient with CP  Objective: Cr 8.5  Goals of Care Determination: Patient wants full support (CPR, vent, surgery, HD, trach, PEG)  Plan:  Hep gtt, LHC, Cardio and Renal consults, Podiatry and wound care consults.  Code Status: Full code   Time spent on Advanced care Plannin minutes  Advanced Care Planning Documents: Completed advanced directives on chart, wife is the POA.    Aaron Artis MD  2025 6:20 PM

## 2025-02-19 ENCOUNTER — APPOINTMENT (OUTPATIENT)
Dept: CT IMAGING | Age: 60
DRG: 321 | End: 2025-02-19
Attending: INTERNAL MEDICINE
Payer: MEDICARE

## 2025-02-19 LAB
ANION GAP SERPL CALCULATED.3IONS-SCNC: 15 MMOL/L (ref 3–16)
ANTI-XA UNFRAC HEPARIN: 0.34 IU/ML (ref 0.3–0.7)
ANTI-XA UNFRAC HEPARIN: 0.39 IU/ML (ref 0.3–0.7)
BUN SERPL-MCNC: 59 MG/DL (ref 7–20)
CALCIUM SERPL-MCNC: 8.8 MG/DL (ref 8.3–10.6)
CHLORIDE SERPL-SCNC: 89 MMOL/L (ref 99–110)
CO2 SERPL-SCNC: 27 MMOL/L (ref 21–32)
CREAT SERPL-MCNC: 9.6 MG/DL (ref 0.9–1.3)
DEPRECATED RDW RBC AUTO: 16.1 % (ref 12.4–15.4)
EKG ATRIAL RATE: 98 BPM
EKG DIAGNOSIS: NORMAL
EKG P AXIS: 57 DEGREES
EKG P-R INTERVAL: 138 MS
EKG Q-T INTERVAL: 368 MS
EKG QRS DURATION: 114 MS
EKG QTC CALCULATION (BAZETT): 469 MS
EKG R AXIS: 57 DEGREES
EKG T AXIS: 97 DEGREES
EKG VENTRICULAR RATE: 98 BPM
EST. AVERAGE GLUCOSE BLD GHB EST-MCNC: 174.3 MG/DL
GFR SERPLBLD CREATININE-BSD FMLA CKD-EPI: 6 ML/MIN/{1.73_M2}
GLUCOSE BLD-MCNC: 137 MG/DL (ref 70–99)
GLUCOSE BLD-MCNC: 141 MG/DL (ref 70–99)
GLUCOSE BLD-MCNC: 154 MG/DL (ref 70–99)
GLUCOSE BLD-MCNC: 195 MG/DL (ref 70–99)
GLUCOSE SERPL-MCNC: 154 MG/DL (ref 70–99)
HBA1C MFR BLD: 7.7 %
HCT VFR BLD AUTO: 33.5 % (ref 40.5–52.5)
HGB BLD-MCNC: 10.9 G/DL (ref 13.5–17.5)
LIPASE SERPL-CCNC: 31 U/L (ref 13–60)
MAGNESIUM SERPL-MCNC: 1.79 MG/DL (ref 1.8–2.4)
MCH RBC QN AUTO: 29.2 PG (ref 26–34)
MCHC RBC AUTO-ENTMCNC: 32.6 G/DL (ref 31–36)
MCV RBC AUTO: 89.8 FL (ref 80–100)
PERFORMED ON: ABNORMAL
PHOSPHATE SERPL-MCNC: 6.2 MG/DL (ref 2.5–4.9)
PLATELET # BLD AUTO: 212 K/UL (ref 135–450)
PMV BLD AUTO: 9.6 FL (ref 5–10.5)
POTASSIUM SERPL-SCNC: 3.4 MMOL/L (ref 3.5–5.1)
RBC # BLD AUTO: 3.73 M/UL (ref 4.2–5.9)
SODIUM SERPL-SCNC: 131 MMOL/L (ref 136–145)
TSH SERPL DL<=0.005 MIU/L-ACNC: 0.41 UIU/ML (ref 0.27–4.2)
WBC # BLD AUTO: 11.2 K/UL (ref 4–11)

## 2025-02-19 PROCEDURE — 85520 HEPARIN ASSAY: CPT

## 2025-02-19 PROCEDURE — 2500000003 HC RX 250 WO HCPCS: Performed by: INTERNAL MEDICINE

## 2025-02-19 PROCEDURE — 84443 ASSAY THYROID STIM HORMONE: CPT

## 2025-02-19 PROCEDURE — 6370000000 HC RX 637 (ALT 250 FOR IP): Performed by: STUDENT IN AN ORGANIZED HEALTH CARE EDUCATION/TRAINING PROGRAM

## 2025-02-19 PROCEDURE — 1200000000 HC SEMI PRIVATE

## 2025-02-19 PROCEDURE — 2060000000 HC ICU INTERMEDIATE R&B

## 2025-02-19 PROCEDURE — 83690 ASSAY OF LIPASE: CPT

## 2025-02-19 PROCEDURE — 36415 COLL VENOUS BLD VENIPUNCTURE: CPT

## 2025-02-19 PROCEDURE — 85027 COMPLETE CBC AUTOMATED: CPT

## 2025-02-19 PROCEDURE — 80048 BASIC METABOLIC PNL TOTAL CA: CPT

## 2025-02-19 PROCEDURE — 6360000002 HC RX W HCPCS: Performed by: INTERNAL MEDICINE

## 2025-02-19 PROCEDURE — 83735 ASSAY OF MAGNESIUM: CPT

## 2025-02-19 PROCEDURE — 93010 ELECTROCARDIOGRAM REPORT: CPT | Performed by: INTERNAL MEDICINE

## 2025-02-19 PROCEDURE — 84100 ASSAY OF PHOSPHORUS: CPT

## 2025-02-19 PROCEDURE — 6360000002 HC RX W HCPCS: Performed by: NURSE PRACTITIONER

## 2025-02-19 PROCEDURE — 6370000000 HC RX 637 (ALT 250 FOR IP): Performed by: INTERNAL MEDICINE

## 2025-02-19 PROCEDURE — 83036 HEMOGLOBIN GLYCOSYLATED A1C: CPT

## 2025-02-19 PROCEDURE — 74176 CT ABD & PELVIS W/O CONTRAST: CPT

## 2025-02-19 RX ORDER — GENTAMICIN SULFATE 1 MG/G
CREAM TOPICAL DAILY
Status: DISCONTINUED | OUTPATIENT
Start: 2025-02-19 | End: 2025-02-28 | Stop reason: HOSPADM

## 2025-02-19 RX ORDER — SODIUM CHLORIDE, SODIUM LACTATE, CALCIUM CHLORIDE, MAGNESIUM CHLORIDE AND DEXTROSE 2.5; 538; 448; 18.3; 5.08 G/100ML; MG/100ML; MG/100ML; MG/100ML; MG/100ML
5000 INJECTION, SOLUTION INTRAPERITONEAL
Status: DISCONTINUED | OUTPATIENT
Start: 2025-02-19 | End: 2025-02-19

## 2025-02-19 RX ORDER — MAGNESIUM SULFATE IN WATER 40 MG/ML
2000 INJECTION, SOLUTION INTRAVENOUS PRN
Status: DISCONTINUED | OUTPATIENT
Start: 2025-02-19 | End: 2025-02-19

## 2025-02-19 RX ORDER — POTASSIUM CHLORIDE 1500 MG/1
40 TABLET, EXTENDED RELEASE ORAL ONCE
Status: COMPLETED | OUTPATIENT
Start: 2025-02-19 | End: 2025-02-19

## 2025-02-19 RX ORDER — SODIUM CHLORIDE, SODIUM LACTATE, CALCIUM CHLORIDE, MAGNESIUM CHLORIDE AND DEXTROSE 2.5; 538; 448; 18.3; 5.08 G/100ML; MG/100ML; MG/100ML; MG/100ML; MG/100ML
2500 INJECTION, SOLUTION INTRAPERITONEAL EVERY 4 HOURS
Status: DISCONTINUED | OUTPATIENT
Start: 2025-02-19 | End: 2025-02-20

## 2025-02-19 RX ADMIN — CLOPIDOGREL BISULFATE 75 MG: 75 TABLET ORAL at 09:25

## 2025-02-19 RX ADMIN — METOCLOPRAMIDE HYDROCHLORIDE 10 MG: 10 TABLET ORAL at 20:26

## 2025-02-19 RX ADMIN — LEVETIRACETAM 500 MG: 500 TABLET, FILM COATED ORAL at 09:25

## 2025-02-19 RX ADMIN — METOCLOPRAMIDE HYDROCHLORIDE 10 MG: 10 TABLET ORAL at 17:53

## 2025-02-19 RX ADMIN — SERTRALINE HYDROCHLORIDE 50 MG: 50 TABLET ORAL at 20:26

## 2025-02-19 RX ADMIN — HEPARIN SODIUM 11 UNITS/KG/HR: 10000 INJECTION, SOLUTION INTRAVENOUS at 19:59

## 2025-02-19 RX ADMIN — CALCITRIOL CAPSULES 0.25 MCG 0.25 MCG: 0.25 CAPSULE ORAL at 09:24

## 2025-02-19 RX ADMIN — SACUBITRIL AND VALSARTAN 1 TABLET: 49; 51 TABLET, FILM COATED ORAL at 20:25

## 2025-02-19 RX ADMIN — HYDROMORPHONE HYDROCHLORIDE 1 MG: 1 INJECTION, SOLUTION INTRAMUSCULAR; INTRAVENOUS; SUBCUTANEOUS at 17:53

## 2025-02-19 RX ADMIN — METOCLOPRAMIDE HYDROCHLORIDE 10 MG: 10 TABLET ORAL at 09:24

## 2025-02-19 RX ADMIN — Medication 10 ML: at 09:25

## 2025-02-19 RX ADMIN — Medication 10 ML: at 01:06

## 2025-02-19 RX ADMIN — CARVEDILOL 12.5 MG: 6.25 TABLET, FILM COATED ORAL at 17:53

## 2025-02-19 RX ADMIN — TORSEMIDE 100 MG: 100 TABLET ORAL at 09:25

## 2025-02-19 RX ADMIN — Medication 10 ML: at 20:27

## 2025-02-19 RX ADMIN — RANOLAZINE 500 MG: 500 TABLET, EXTENDED RELEASE ORAL at 20:26

## 2025-02-19 RX ADMIN — RANOLAZINE 500 MG: 500 TABLET, EXTENDED RELEASE ORAL at 09:24

## 2025-02-19 RX ADMIN — ISOSORBIDE MONONITRATE 30 MG: 30 TABLET, EXTENDED RELEASE ORAL at 20:26

## 2025-02-19 RX ADMIN — ONDANSETRON 4 MG: 2 INJECTION INTRAMUSCULAR; INTRAVENOUS at 17:53

## 2025-02-19 RX ADMIN — ASPIRIN 81 MG: 81 TABLET, COATED ORAL at 09:24

## 2025-02-19 RX ADMIN — SACUBITRIL AND VALSARTAN 1 TABLET: 49; 51 TABLET, FILM COATED ORAL at 09:28

## 2025-02-19 RX ADMIN — POTASSIUM CHLORIDE 40 MEQ: 1500 TABLET, EXTENDED RELEASE ORAL at 09:25

## 2025-02-19 RX ADMIN — HYDROMORPHONE HYDROCHLORIDE 1 MG: 1 INJECTION, SOLUTION INTRAMUSCULAR; INTRAVENOUS; SUBCUTANEOUS at 12:42

## 2025-02-19 RX ADMIN — ONDANSETRON 4 MG: 2 INJECTION INTRAMUSCULAR; INTRAVENOUS at 09:25

## 2025-02-19 RX ADMIN — CARVEDILOL 12.5 MG: 6.25 TABLET, FILM COATED ORAL at 09:25

## 2025-02-19 RX ADMIN — ROSUVASTATIN 40 MG: 20 TABLET, FILM COATED ORAL at 20:26

## 2025-02-19 RX ADMIN — HYDROMORPHONE HYDROCHLORIDE 1 MG: 1 INJECTION, SOLUTION INTRAMUSCULAR; INTRAVENOUS; SUBCUTANEOUS at 06:20

## 2025-02-19 ASSESSMENT — PAIN SCALES - GENERAL
PAINLEVEL_OUTOF10: 7
PAINLEVEL_OUTOF10: 2
PAINLEVEL_OUTOF10: 0
PAINLEVEL_OUTOF10: 4

## 2025-02-19 ASSESSMENT — PAIN DESCRIPTION - ORIENTATION
ORIENTATION: LEFT;MID
ORIENTATION: MID

## 2025-02-19 ASSESSMENT — PAIN DESCRIPTION - DESCRIPTORS
DESCRIPTORS: DISCOMFORT
DESCRIPTORS: DISCOMFORT

## 2025-02-19 ASSESSMENT — PAIN DESCRIPTION - LOCATION
LOCATION: CHEST

## 2025-02-19 NOTE — PROGRESS NOTES
[facility]PD Order   Exchanges: 4   Exchange Volume: 2500 ml   Total Time: 10 hrs   Dextrose: 2.5%   Last Fill: 0 ml   Total Volume: 61184 ml     Orders verified. Supplies taken to pt's room. Report received. Cycler set up, primed and pre tested. Dressing [facility]. Pt connected to cycler. CCPD initiated without problem. Initial effluent clear.       If problems should arise please call the [phone] on top of PD cycler machine.

## 2025-02-19 NOTE — PROGRESS NOTES
PD emergency nurse line called to hook pt up to PD tonight. PD nurse to call back to see if pt is on the list.If not on the list then he will get PD in the AM. Will monitor.

## 2025-02-19 NOTE — PROGRESS NOTES
normoactive bowel sounds. No masses, no hepatosplenomegaly   Extremities: No cyanosis, clubbing or pitting edema.  Right foot bandaged   Vascular: Diminished pedal pulses   Skin: Skin color, texture, turgor are normal with no rashes or ulceration.    Pysch: Appropriate affect   Neurologic: Oriented to person, place and time. No slurred speech or facial asymmetry. No motor or sensory deficits on gross examination.         Labs:   CBC:   Lab Results   Component Value Date/Time    WBC 4.7 02/20/2025 06:52 AM    RBC 3.57 02/20/2025 06:52 AM    HGB 10.5 02/20/2025 06:52 AM    HCT 32.1 02/20/2025 06:52 AM    MCV 90.0 02/20/2025 06:52 AM    RDW 15.9 02/20/2025 06:52 AM     02/20/2025 06:52 AM     CMP:  Lab Results   Component Value Date/Time     02/20/2025 06:52 AM    K 3.4 02/20/2025 06:52 AM    K 5.8 01/15/2025 04:27 AM    CL 90 02/20/2025 06:52 AM    CO2 26 02/20/2025 06:52 AM    BUN 58 02/20/2025 06:52 AM    CREATININE 10.3 02/20/2025 06:52 AM    GFRAA 9 10/03/2022 04:45 AM    GFRAA 44 06/15/2013 01:00 AM    AGRATIO 1.2 02/17/2025 09:40 AM    LABGLOM 5 02/20/2025 06:52 AM    LABGLOM 6 04/30/2024 04:51 AM    GLUCOSE 225 02/20/2025 06:52 AM    CALCIUM 8.0 02/20/2025 06:52 AM    BILITOT 0.3 02/17/2025 09:40 AM    ALKPHOS 115 02/17/2025 09:40 AM    AST 15 02/17/2025 09:40 AM    ALT 8 02/17/2025 09:40 AM     PT/INR:  No results found for: \"PTINR\"  HgBA1c:  Lab Results   Component Value Date    LABA1C 7.7 02/19/2025     Lab Results   Component Value Date    CKTOTAL 71 11/22/2023    CKMB 1.92 05/26/2010    CKMBINDEX 1.8 05/26/2010    TROPONINI 0.10 (H) 10/01/2022         Interval Testing/Data:     EKG: personally reviewed with my interpretation as above.     Telemetry personally reviewed: Sinus rhythm    Cardiac cath 2/18/25  1. LM-40% distal  2. LAD-100% ostial  3. Cx-100% mid  4. RCA-nondominant, small caliber vessel  5. RV marginal-70% mid, 90% distal, small caliber vessel, collaterals to distal circumflex  6.        NSTEMI     Procedure Details:  Consent Access Bleed R Sedat Start Stop Versed Fentanyl Contrast Fluoro EBL Comp Spec   Yes RCFA high MCS 1456 1554 1.5 75 132 13.4 <20 None None   *Ultrasound Note: Ultrasound guidance used to determine aforementioned artery patency, size (>2mm), anatomic variations and ideal puncture location.  Real-time ultrasound utilized concurrent with vascular needle entry into the artery.  Image(s) permanently recorded and reported in the patient chart.  *Sedation Note: MCSFC = minimal conscious sedation for comfort     Findings:  Artery Findings/Result   LM Normal   % ostial   Cx 100% mid   % ostial   RCA Nondominant, Smaller RV marginal with mid 70% and distal 90% lesions supplying collateral to distal Cx (old finding), small but could potentially be stented if symptoms merit   S-OM Patent, 99% ostial with ABEL 1 flow   L-LAD patent   LVEDP 6   LVG 35%, inferior hk      Intervention:         PCI of SVG-OM1 with 3.5 X 23 Xience MARIIA (PD with 3.75NC throughout)     Post Cath Dx:       Severe disease as above, culprit SVG     Cardiac cath 7/29/19:  Procedures: LHC, Cor angio, SVG angio, LIMA angio     LM 20%  % ostium  Cx 100% prox. (prior cath had small OM1 visable that is now occluded)   RCA dominant, diffusely diseased w/ distal 90% in area artery is approx 1 mm in diameter. No sig change from 2017.  LIMA to LAD patent  SVG to OM2 40%  LVEDP 28 mmHg     Med mgmt  EP eval for NSVT, Afib  Resume AC. Heparin drip pending EP eval.   BP and fluid mgmt per nephrology   No ACE/ARB due to CKD  DAPT, statin, BBlk      Cardiac cath 7/7/17  ANGIOGRAPHIC FINDINGS:  1.  The left main comes from left coronary cusp; distally it gives rise to left circumflex which goes into the OM1.  Rest of the circumflex is occluded.  Left anterior descending artery is occluded.  2.  Saphenous vein graft to obtuse marginal 2 is patent.  3.  Left internal mammary artery, left anterior descending

## 2025-02-19 NOTE — PROGRESS NOTES
injection 2,000 Units, PRN  heparin 25,000 units in dextrose 5% 250 mL (premix) infusion, Continuous  ondansetron (ZOFRAN) injection 4 mg, Q6H PRN  dianeal lo-miller 4.25% 6,000 mL, Continuous  dianeal lo-miller (ULTRABAG) 2.5% 6,000 mL, Q6H        Objective:  BP (!) 128/90   Pulse 74   Temp 97 °F (36.1 °C) (Temporal)   Resp 16   Ht 1.727 m (5' 8\")   Wt 89.7 kg (197 lb 12 oz)   SpO2 100%   BMI 30.07 kg/m²     Intake/Output Summary (Last 24 hours) at 2/19/2025 1050  Last data filed at 2/19/2025 0925  Gross per 24 hour   Intake 730 ml   Output 1205 ml   Net -475 ml      Wt Readings from Last 3 Encounters:   02/19/25 89.7 kg (197 lb 12 oz)   02/17/25 84.8 kg (187 lb)   01/31/25 84.7 kg (186 lb 12.8 oz)       General appearance:  Appears comfortable  Cardiovascular: Regular rhythm, normal S1, S2. No murmur. No edema in lower extremities  Respiratory: Not using accessory muscles. Good inspiratory effort. Clear to auscultation bilaterally, no wheeze or crackles.   GI: Abdomen soft, no tenderness, not distended, normal bowel sounds  Musculoskeletal: Neck supple, dressing intact right foot (recent 5th toe amputation)  Neurology: CN 2-12 grossly intact. No speech or motor deficits  Psych: Normal affect. Alert and oriented in time, place and person  Skin: Warm, dry, normal turgor    Labs and Tests:  CBC:   Recent Labs     02/18/25  0905 02/18/25  1910 02/19/25  0224   WBC 10.4 10.6 11.2*   HGB 11.5* 10.2* 10.9*    205 212     BMP:    Recent Labs     02/17/25  0940 02/18/25  0905 02/19/25  0224    134* 131*   K 3.1* 3.6 3.4*   CL 94* 90* 89*   CO2 28 26 27   BUN 35* 49* 59*   CREATININE 8.2* 8.5* 9.6*   GLUCOSE 67* 101* 154*     Hepatic:   Recent Labs     02/17/25  0940   AST 15   ALT 8*   BILITOT 0.3   ALKPHOS 115       Imaging  No orders to display       [facility] 2/18/2025:   1. LM-40% distal  2. LAD-100% ostial  3. Cx-100% mid  4. RCA-nondominant, small caliber vessel  5. RV marginal-70% mid, 90% distal, small  caliber vessel, collaterals to distal circumflex  6. LIMA-LAD: Widely patent. Post anastomotic LAD supplying collaterals to the circumflex  7. SVG-OM2: 70% proximal, in-stent, 99% mid, in-stent  8., LV gram not done due to elevated LVEDP. LVEDP 36 mmHg  9. Will need further discussion and evaluation for treatment options of restenosed stent and SVG-OM2       Problem List  Principal Problem:    Unstable angina ([facility])  Active Problems:    PAD (peripheral artery disease)    Chronic systolic heart failure ([facility])    ESRD on peritoneal dialysis ([facility])    Type 1 diabetes mellitus ([facility])    CAD S/P percutaneous coronary angioplasty    S/P CABG (coronary artery bypass graft)    Worsening angina ([facility])  Resolved Problems:    * No resolved hospital problems. *       Assessment & Plan:     Unstable angina  - Hx CABG (2021), PCI of SVG-OM2 (4/2021)  - [facility] yesterday revealed 99% in-stent stenosis SVG-OM2  - On heparin drip, follow anti-XA  - Cardiology considering revascularization options  - Continue asa 81 mg, Plavix 75 mg, carvedilol 12.5 mg, rosuvastatin 40 mg, Ranexa 500 mg, Entresto 49/51 mg    ESRD on PD  - Management per nephrology    DM1  - A1C 7.7  - On insulin pump, continued on admission  - BG values controlled  - Continue hypoglycemia correction protocol    PVD,  Diabetic foot ulcer  - Recent aortogram (1/2025) with angioplasty of anterior tibial artery and posterior tibial artery   - S/p fifth digit amputation right foot 2/17/2025  - Podiatry consult pending  - Wound care consult pending    Nausea,  Vomiting  - Nursing reports patient with several emesis today  - Ordered CT abdomen and pelvis      Diet: ADULT DIET; Regular; Low Fat/Low Chol/High Fiber/2 gm Na  Code:Full Code  DVT PPX: heparin      Anuradha Oosrio, APRN - CNP   2/19/2025 10:50 AM

## 2025-02-19 NOTE — PROGRESS NOTES
Anti Xa drawn per protocol noted to be 0.34 . Theraputic range, no changes. Rate currently @ 11 units/kg/hr. Next lab due at tomorrow.

## 2025-02-19 NOTE — DIALYSIS
Received a call from Lali, floor charge nurse. Asking abut valentino PD drain. I informed Lali that we do not do valentino exchanges and that someone from [place] will need to do so. Autumn  is aware.

## 2025-02-19 NOTE — PROGRESS NOTES
Patient sleeping.  Chart reviewed.  Needs peritoneal dialysis.  Options of revascularization being discussed.

## 2025-02-19 NOTE — CARE COORDINATION
02/19/25 0850   IMM Letter   IMM Letter given to Patient/Family/Significant other/Guardian/POA/by: IMM Given   IMM Letter date given: 02/19/25   IMM Letter time given: 0850

## 2025-02-20 LAB
ANION GAP SERPL CALCULATED.3IONS-SCNC: 15 MMOL/L (ref 3–16)
ANTI-XA UNFRAC HEPARIN: 0.33 IU/ML (ref 0.3–0.7)
BUN SERPL-MCNC: 58 MG/DL (ref 7–20)
CALCIUM SERPL-MCNC: 8 MG/DL (ref 8.3–10.6)
CHLORIDE SERPL-SCNC: 90 MMOL/L (ref 99–110)
CO2 SERPL-SCNC: 26 MMOL/L (ref 21–32)
CREAT SERPL-MCNC: 10.3 MG/DL (ref 0.9–1.3)
DEPRECATED RDW RBC AUTO: 15.9 % (ref 12.4–15.4)
GFR SERPLBLD CREATININE-BSD FMLA CKD-EPI: 5 ML/MIN/{1.73_M2}
GLUCOSE BLD-MCNC: 175 MG/DL (ref 70–99)
GLUCOSE BLD-MCNC: 196 MG/DL (ref 70–99)
GLUCOSE BLD-MCNC: 245 MG/DL (ref 70–99)
GLUCOSE BLD-MCNC: 87 MG/DL (ref 70–99)
GLUCOSE SERPL-MCNC: 225 MG/DL (ref 70–99)
HCT VFR BLD AUTO: 32.1 % (ref 40.5–52.5)
HGB BLD-MCNC: 10.5 G/DL (ref 13.5–17.5)
MCH RBC QN AUTO: 29.5 PG (ref 26–34)
MCHC RBC AUTO-ENTMCNC: 32.8 G/DL (ref 31–36)
MCV RBC AUTO: 90 FL (ref 80–100)
PERFORMED ON: ABNORMAL
PERFORMED ON: NORMAL
PLATELET # BLD AUTO: 182 K/UL (ref 135–450)
PMV BLD AUTO: 9.3 FL (ref 5–10.5)
POTASSIUM SERPL-SCNC: 3.4 MMOL/L (ref 3.5–5.1)
RBC # BLD AUTO: 3.57 M/UL (ref 4.2–5.9)
SODIUM SERPL-SCNC: 131 MMOL/L (ref 136–145)
TROPONIN, HIGH SENSITIVITY: 221 NG/L (ref 0–22)
WBC # BLD AUTO: 4.7 K/UL (ref 4–11)

## 2025-02-20 PROCEDURE — 2060000000 HC ICU INTERMEDIATE R&B

## 2025-02-20 PROCEDURE — 85520 HEPARIN ASSAY: CPT

## 2025-02-20 PROCEDURE — 6370000000 HC RX 637 (ALT 250 FOR IP): Performed by: STUDENT IN AN ORGANIZED HEALTH CARE EDUCATION/TRAINING PROGRAM

## 2025-02-20 PROCEDURE — 6360000002 HC RX W HCPCS: Performed by: INTERNAL MEDICINE

## 2025-02-20 PROCEDURE — 3E1M39Z IRRIGATION OF PERITONEAL CAVITY USING DIALYSATE, PERCUTANEOUS APPROACH: ICD-10-PCS | Performed by: ANESTHESIOLOGY

## 2025-02-20 PROCEDURE — 36415 COLL VENOUS BLD VENIPUNCTURE: CPT

## 2025-02-20 PROCEDURE — 6360000002 HC RX W HCPCS: Performed by: NURSE PRACTITIONER

## 2025-02-20 PROCEDURE — 1200000000 HC SEMI PRIVATE

## 2025-02-20 PROCEDURE — 84484 ASSAY OF TROPONIN QUANT: CPT

## 2025-02-20 PROCEDURE — 85027 COMPLETE CBC AUTOMATED: CPT

## 2025-02-20 PROCEDURE — 80048 BASIC METABOLIC PNL TOTAL CA: CPT

## 2025-02-20 PROCEDURE — 2500000003 HC RX 250 WO HCPCS: Performed by: INTERNAL MEDICINE

## 2025-02-20 PROCEDURE — 93005 ELECTROCARDIOGRAM TRACING: CPT | Performed by: INTERNAL MEDICINE

## 2025-02-20 PROCEDURE — 6370000000 HC RX 637 (ALT 250 FOR IP): Performed by: INTERNAL MEDICINE

## 2025-02-20 PROCEDURE — 90945 DIALYSIS ONE EVALUATION: CPT

## 2025-02-20 RX ORDER — POTASSIUM CHLORIDE 1500 MG/1
40 TABLET, EXTENDED RELEASE ORAL ONCE
Status: COMPLETED | OUTPATIENT
Start: 2025-02-20 | End: 2025-02-20

## 2025-02-20 RX ADMIN — TORSEMIDE 100 MG: 100 TABLET ORAL at 08:38

## 2025-02-20 RX ADMIN — HYDROMORPHONE HYDROCHLORIDE 1 MG: 1 INJECTION, SOLUTION INTRAMUSCULAR; INTRAVENOUS; SUBCUTANEOUS at 08:31

## 2025-02-20 RX ADMIN — ONDANSETRON 4 MG: 2 INJECTION INTRAMUSCULAR; INTRAVENOUS at 08:31

## 2025-02-20 RX ADMIN — ONDANSETRON 4 MG: 2 INJECTION INTRAMUSCULAR; INTRAVENOUS at 20:31

## 2025-02-20 RX ADMIN — SACUBITRIL AND VALSARTAN 1 TABLET: 49; 51 TABLET, FILM COATED ORAL at 20:20

## 2025-02-20 RX ADMIN — ROSUVASTATIN 40 MG: 20 TABLET, FILM COATED ORAL at 20:20

## 2025-02-20 RX ADMIN — RANOLAZINE 500 MG: 500 TABLET, EXTENDED RELEASE ORAL at 08:38

## 2025-02-20 RX ADMIN — LEVETIRACETAM 500 MG: 500 TABLET, FILM COATED ORAL at 08:38

## 2025-02-20 RX ADMIN — METOCLOPRAMIDE HYDROCHLORIDE 10 MG: 10 TABLET ORAL at 08:38

## 2025-02-20 RX ADMIN — METOCLOPRAMIDE HYDROCHLORIDE 10 MG: 10 TABLET ORAL at 17:27

## 2025-02-20 RX ADMIN — CARVEDILOL 12.5 MG: 6.25 TABLET, FILM COATED ORAL at 17:27

## 2025-02-20 RX ADMIN — HYDROMORPHONE HYDROCHLORIDE 1 MG: 1 INJECTION, SOLUTION INTRAMUSCULAR; INTRAVENOUS; SUBCUTANEOUS at 14:35

## 2025-02-20 RX ADMIN — METOCLOPRAMIDE HYDROCHLORIDE 10 MG: 10 TABLET ORAL at 14:35

## 2025-02-20 RX ADMIN — HEPARIN SODIUM 11 UNITS/KG/HR: 10000 INJECTION, SOLUTION INTRAVENOUS at 23:25

## 2025-02-20 RX ADMIN — CALCITRIOL CAPSULES 0.25 MCG 0.25 MCG: 0.25 CAPSULE ORAL at 08:37

## 2025-02-20 RX ADMIN — SACUBITRIL AND VALSARTAN 1 TABLET: 49; 51 TABLET, FILM COATED ORAL at 08:42

## 2025-02-20 RX ADMIN — HYDROMORPHONE HYDROCHLORIDE 1 MG: 1 INJECTION, SOLUTION INTRAMUSCULAR; INTRAVENOUS; SUBCUTANEOUS at 20:32

## 2025-02-20 RX ADMIN — POTASSIUM CHLORIDE 40 MEQ: 1500 TABLET, EXTENDED RELEASE ORAL at 08:37

## 2025-02-20 RX ADMIN — RANOLAZINE 500 MG: 500 TABLET, EXTENDED RELEASE ORAL at 20:20

## 2025-02-20 RX ADMIN — METOCLOPRAMIDE HYDROCHLORIDE 10 MG: 10 TABLET ORAL at 20:20

## 2025-02-20 RX ADMIN — ONDANSETRON 4 MG: 2 INJECTION INTRAMUSCULAR; INTRAVENOUS at 14:35

## 2025-02-20 RX ADMIN — CARVEDILOL 12.5 MG: 6.25 TABLET, FILM COATED ORAL at 08:38

## 2025-02-20 RX ADMIN — Medication 10 ML: at 20:23

## 2025-02-20 RX ADMIN — ISOSORBIDE MONONITRATE 30 MG: 30 TABLET, EXTENDED RELEASE ORAL at 20:20

## 2025-02-20 RX ADMIN — Medication 10 ML: at 08:38

## 2025-02-20 RX ADMIN — SERTRALINE HYDROCHLORIDE 50 MG: 50 TABLET ORAL at 20:21

## 2025-02-20 RX ADMIN — ASPIRIN 81 MG: 81 TABLET, COATED ORAL at 08:37

## 2025-02-20 RX ADMIN — CLOPIDOGREL BISULFATE 75 MG: 75 TABLET ORAL at 08:37

## 2025-02-20 ASSESSMENT — PAIN DESCRIPTION - ORIENTATION
ORIENTATION: MID
ORIENTATION: MID;LEFT

## 2025-02-20 ASSESSMENT — PAIN DESCRIPTION - LOCATION
LOCATION: CHEST

## 2025-02-20 ASSESSMENT — PAIN SCALES - GENERAL
PAINLEVEL_OUTOF10: 6
PAINLEVEL_OUTOF10: 3
PAINLEVEL_OUTOF10: 7
PAINLEVEL_OUTOF10: 0
PAINLEVEL_OUTOF10: 2
PAINLEVEL_OUTOF10: 7

## 2025-02-20 ASSESSMENT — PAIN SCALES - WONG BAKER: WONGBAKER_NUMERICALRESPONSE: NO HURT

## 2025-02-20 ASSESSMENT — PAIN DESCRIPTION - DESCRIPTORS: DESCRIPTORS: ACHING

## 2025-02-20 NOTE — PROGRESS NOTES
Treatment time: 10 Hours 40 minutes  Net UF: -553 ml  Dwell Time: 36 minutes gained    Treatment completed without complications or complaints from patient. Effluent clear yellow and lines taped to patient per protocol. Patient resting comfortably with VSS upon exiting room.    Patient c/o pain after standing on the scale primary nurse notified   Copy of dialysis treatment record placed in chart, to be scanned into EMR and report given to Adriana Wood RN.

## 2025-02-20 NOTE — PROGRESS NOTES
Nephrology Progress Note  [facility]  [phone]  [email or web address]        Subjective:   Te Garay: 59 y.o.  male who we are seeing in consultation for ESRD Management.    Brief HPI:  Te Garay is a 59 y.o. male with a PMHx of ESRD on PD, CAD/CABG/CHF, carotid artery stenosis, DM 2, diabetic neuropathy, hypertension, hyperlipidemia, seizure disorder.   He had fifth digit of amputation of right foot on , then was admitted on  for planned elective LHC that was completed. We were consulted for PD management.   LHC showed SVG-OM2: 70% proximal, in-stent, 99% mid, in-stent. LVEDP 36mmHg  He reports that last PD treatment was on Monday night, did not get it overnight.   Reports that his prescription is 10hrs, 4 cycles, 11L total, 2.2L with 2L extraneal.   He has residual function, urinates ~1L /day .  Extraneal not drained since Monday morning, as did not get PD overnight.   He reports nausea and vomiting episodes since last night only. No chest pain. No SOB. No abdominal pain. He reports received about 3L of fluid during his foot surgery on .     Interval History / Subjective:  - Seen this am   - BP in good range.   - Breathing comfortably, sating 95% on room air.   - he feels congested.   - PD resumed yesterday night, effluent reported as clear, UF 553ml.       Objective:   VITALS:    Vitals:    25 0830   BP: 114/68   Pulse: 80   Resp:    Temp:    SpO2: 100%                                                                                    Temp (24hrs), Av.3 °F (36.3 °C), Min:96.9 °F (36.1 °C), Max:97.6 °F (36.4 °C)     BP  Min: 91/54  Max: 146/75                                  Pulse  Av  Min: 71  Max: 94    24HR INTAKE/OUTPUT:    Intake/Output Summary (Last 24 hours) at 2025 1211  Last data filed at 2025 1830  Gross per 24 hour   Intake 1428.62 ml   Output 1650 ml   Net -221.38 ml     Wt Readings from Last 3 Encounters:   25 86.1 kg (189 lb

## 2025-02-20 NOTE — PLAN OF CARE
Problem: Chronic Conditions and Co-morbidities  Goal: Patient's chronic conditions and co-morbidity symptoms are monitored and maintained or improved  Outcome: Progressing     Problem: Discharge Planning  Goal: Discharge to home or other facility with appropriate resources  Outcome: Progressing     Problem: Pain  Goal: Verbalizes/displays adequate comfort level or baseline comfort level  Outcome: Progressing     Problem: Safety - Adult  Goal: Free from fall injury  Outcome: Progressing     Problem: ABCDS Injury Assessment  Goal: Absence of physical injury  Outcome: Progressing     Problem: Cardiovascular - Adult  Goal: Maintains optimal cardiac output and hemodynamic stability  Outcome: Progressing  Goal: Absence of cardiac dysrhythmias or at baseline  Outcome: Progressing     Problem: Skin/Tissue Integrity - Adult  Goal: Skin integrity remains intact  Outcome: Progressing  Goal: Incisions, wounds, or drain sites healing without S/S of infection  Outcome: Progressing  Goal: Oral mucous membranes remain intact  Outcome: Progressing     Problem: Musculoskeletal - Adult  Goal: Return mobility to safest level of function  Outcome: Progressing  Goal: Maintain proper alignment of affected body part  Outcome: Progressing  Goal: Return ADL status to a safe level of function  Outcome: Progressing     Problem: Infection - Adult  Goal: Absence of infection at discharge  Outcome: Progressing  Goal: Absence of infection during hospitalization  Outcome: Progressing     Problem: Metabolic/Fluid and Electrolytes - Adult  Goal: Electrolytes maintained within normal limits  Outcome: Progressing  Goal: Hemodynamic stability and optimal renal function maintained  Outcome: Progressing  Goal: Glucose maintained within prescribed range  Outcome: Progressing     Problem: Hematologic - Adult  Goal: Maintains hematologic stability  Outcome: Progressing     Problem: Neurosensory - Adult  Goal: Achieves stable or improved neurological  status  Outcome: Progressing     Problem: Respiratory - Adult  Goal: Achieves optimal ventilation and oxygenation  Outcome: Progressing     Problem: Gastrointestinal - Adult  Goal: Minimal or absence of nausea and vomiting  Outcome: Progressing  Goal: Maintains or returns to baseline bowel function  Outcome: Progressing  Goal: Maintains adequate nutritional intake  Outcome: Progressing     Problem: Genitourinary - Adult  Goal: Absence of urinary retention  Outcome: Progressing     Problem: Behavior  Goal: Pt/Family maintain appropriate behavior and adhere to behavioral management agreement, if implemented  Description: INTERVENTIONS:  1. Assess patient/family's coping skills and  non-compliant behavior (including use of illegal substances)  2. Notify security of behavior or suspected illegal substances which indicate the need for search of the family and/or belongings  3. Encourage verbalization of thoughts and concerns in a socially appropriate manner  4. Utilize positive, consistent limit setting strategies supporting safety of patient, staff and others  5. Encourage participation in the decision making process about the behavioral management agreement  6. If a visitor's behavior poses a threat to safety call refer to organization policy.  7. Initiate consult with , Psychosocial CNS, Spiritual Care as appropriate  Outcome: Progressing

## 2025-02-20 NOTE — PROGRESS NOTES
[facility]    2/20/2025 11:17 AM        Name: Te Garay .              Admitted: 2/18/2025  Primary Care Provider: Reid Lei MD (Tel: [phone])      Chief complaint: 60 yo male with hx CAD/CABG, presented to hospital for [facility], found to have in-stent stenosis SVG-OM2. Admitted with unstable angina.      Subjective:  Resting in bed, wife visiting. Patient reports he is feeling better today. No further nausea or vomiting. Notes some chest tightness but just mild (1 of 10). Denies shortness of breath, abdominal pain.  Awaiting cardiology plan.    Reviewed interval ancillary notes    Current Medications  dianeal lo-miller (ULTRABAG) 2.5% 2,500 mL, Q4H  gentamicin (GARAMYCIN) 0.1 % cream, Daily  HYDROmorphone HCl PF (DILAUDID) injection 1 mg, Q4H PRN  aspirin EC tablet 81 mg, Daily  levETIRAcetam (KEPPRA) tablet 500 mg, Daily  isosorbide mononitrate (IMDUR) extended release tablet 30 mg, Nightly  [START ON 2/22/2025] vitamin D (ERGOCALCIFEROL) capsule 50,000 Units, Weekly  carvedilol (COREG) tablet 12.5 mg, BID WC  sertraline (ZOLOFT) tablet 50 mg, Nightly  ranolazine (RANEXA) extended release tablet 500 mg, BID  calcitRIOL (ROCALTROL) capsule 0.25 mcg, Daily  clopidogrel (PLAVIX) tablet 75 mg, Daily  nitroGLYCERIN (NITROSTAT) SL tablet 0.4 mg, Q5 Min PRN  metoclopramide (REGLAN) tablet 10 mg, 4x Daily  torsemide (DEMADEX) tablet 100 mg, Daily  rosuvastatin (CRESTOR) tablet 40 mg, Nightly  sacubitril-valsartan (ENTRESTO) 49-51 MG per tablet 1 tablet, BID  sodium chloride flush 0.9 % injection 5-40 mL, 2 times per day  sodium chloride flush 0.9 % injection 5-40 mL, PRN  0.9 % sodium chloride infusion, PRN  acetaminophen (TYLENOL) tablet 650 mg, Q4H PRN  Insulin Pump - Basal Dose , Daily  dextrose bolus 10% 125 mL, PRN   Or  dextrose bolus 10% 250 mL, PRN  glucagon injection 1 mg, PRN  dextrose 10 % infusion,  2/18/2025:   1. LM-40% distal  2. LAD-100% ostial  3. Cx-100% mid  4. RCA-nondominant, small caliber vessel  5. RV marginal-70% mid, 90% distal, small caliber vessel, collaterals to distal circumflex  6. LIMA-LAD: Widely patent. Post anastomotic LAD supplying collaterals to the circumflex  7. SVG-OM2: 70% proximal, in-stent, 99% mid, in-stent  8., LV gram not done due to elevated LVEDP. LVEDP 36 mmHg  9. Will need further discussion and evaluation for treatment options of restenosed stent and SVG-OM2       Problem List  Principal Problem:    Unstable angina ([facility]ly [facility])  Active Problems:    PAD (peripheral artery disease)    Chronic systolic heart failure (Formerly [facility])    ESRD on peritoneal dialysis (Formerly [facility])    Type 1 diabetes mellitus (Formerly [facility])    CAD S/P percutaneous coronary angioplasty    S/P CABG (coronary artery bypass graft)    Worsening angina (Formerly [facility])  Resolved Problems:    * No resolved hospital problems. *       Assessment & Plan:     Unstable angina  - Hx CABG (2021), PCI of SVG-OM2 (4/2021)  - [facility] yesterday revealed 99% in-stent stenosis SVG-OM2  - On heparin drip, follow anti-XA  - Cardiology considering revascularization options  - Continue asa 81 mg, Plavix 75 mg, carvedilol 12.5 mg, rosuvastatin 40 mg, Ranexa 500 mg, Entresto 49/51 mg    ESRD on PD  - Management per nephrology    DM1  - A1C 7.7  - On insulin pump, continued on admission  - BG values controlled  - Continue hypoglycemia correction protocol    PVD,  Diabetic foot ulcer  - Recent aortogram (1/2025) with angioplasty of anterior tibial artery and posterior tibial artery   - S/p fifth digit amputation right foot 2/17/2025  - Podiatry consult pending  - Wound care consult pending    Nausea,  Vomiting  - CT abd/pelvis with cholelithiasis but no acute findings  - LFTs WNL  - Lipase 31  - Resolved  - Continue to monitor      Diet: ADULT DIET; Regular; Low Fat/Low Chol/High Fiber/2 gm Na  Code:Full Code  DVT PPX: heparin      Anuradha Osorio, APRN - CNP   2/20/2025

## 2025-02-20 NOTE — DIALYSIS
CCPD Order   Exchanges: 4   Exchange Volume: 2500 ml   Total Time: 10 hrs   Dextrose: 2.5% and 4.25%$      Total Volume: 1000 ml     Orders verified. Supplies taken to pt's room. Report received. Cycler set up, primed and pre tested. Dressing changed on [facility] Cath site. Pt connected to cycler. CCPD initiated without problem. Initial effluent clear.       If problems should arise please call the [phone] on top of PD cycler machine.

## 2025-02-20 NOTE — PLAN OF CARE
Problem: Chronic Conditions and Co-morbidities  Goal: Patient's chronic conditions and co-morbidity symptoms are monitored and maintained or improved  Outcome: Progressing     Problem: Discharge Planning  Goal: Discharge to home or other facility with appropriate resources  Outcome: Progressing     Problem: Pain  Goal: Verbalizes/displays adequate comfort level or baseline comfort level  Outcome: Progressing     Problem: Safety - Adult  Goal: Free from fall injury  Outcome: Progressing     Problem: ABCDS Injury Assessment  Goal: Absence of physical injury  Outcome: Progressing     Problem: Cardiovascular - Adult  Goal: Maintains optimal cardiac output and hemodynamic stability  Outcome: Progressing  Goal: Absence of cardiac dysrhythmias or at baseline  Outcome: Progressing     Problem: Skin/Tissue Integrity - Adult  Goal: Skin integrity remains intact  Outcome: Progressing  Goal: Incisions, wounds, or drain sites healing without S/S of infection  Outcome: Progressing  Goal: Oral mucous membranes remain intact  Outcome: Progressing     Problem: Musculoskeletal - Adult  Goal: Return mobility to safest level of function  Outcome: Progressing  Goal: Maintain proper alignment of affected body part  Outcome: Progressing  Goal: Return ADL status to a safe level of function  Outcome: Progressing     Problem: Infection - Adult  Goal: Absence of infection at discharge  Outcome: Progressing  Goal: Absence of infection during hospitalization  Outcome: Progressing     Problem: Metabolic/Fluid and Electrolytes - Adult  Goal: Electrolytes maintained within normal limits  Outcome: Progressing  Goal: Hemodynamic stability and optimal renal function maintained  Outcome: Progressing  Goal: Glucose maintained within prescribed range  Outcome: Progressing     Problem: Hematologic - Adult  Goal: Maintains hematologic stability  Outcome: Progressing     Problem: Neurosensory - Adult  Goal: Achieves stable or improved neurological  status  Outcome: Progressing     Problem: Respiratory - Adult  Goal: Achieves optimal ventilation and oxygenation  Outcome: Progressing     Problem: Gastrointestinal - Adult  Goal: Minimal or absence of nausea and vomiting  Outcome: Progressing  Goal: Maintains or returns to baseline bowel function  Outcome: Progressing  Goal: Maintains adequate nutritional intake  Outcome: Progressing     Problem: Genitourinary - Adult  Goal: Absence of urinary retention  Outcome: Progressing

## 2025-02-20 NOTE — CARE COORDINATION
[facility] nurse consulted for right foot wound.  Patient has Right 5th toe amputated on 2/17/25 by Dr Natanael Rios DPM (see Op note). Patient has sterile dressing in place.with instructions not to remove until seen by Dr Lowe.  Patient admitted on 2/18/25 for unstable angina.  Dressing in place, no strikethrough and drainage noted on ace.  Podiatry has been consulted.  No new orders at this time. Will leave surgical dressing in place. YUNG DORADON, RN, CWOCN  Inpatient  Wound/Ostomy Care  [phone]

## 2025-02-21 LAB
ANION GAP SERPL CALCULATED.3IONS-SCNC: 15 MMOL/L (ref 3–16)
ANTI-XA UNFRAC HEPARIN: 0.26 IU/ML (ref 0.3–0.7)
ANTI-XA UNFRAC HEPARIN: 0.5 IU/ML (ref 0.3–0.7)
ANTI-XA UNFRAC HEPARIN: 0.75 IU/ML (ref 0.3–0.7)
BUN SERPL-MCNC: 55 MG/DL (ref 7–20)
CALCIUM SERPL-MCNC: 8.1 MG/DL (ref 8.3–10.6)
CHLORIDE SERPL-SCNC: 92 MMOL/L (ref 99–110)
CO2 SERPL-SCNC: 24 MMOL/L (ref 21–32)
CREAT SERPL-MCNC: 10.2 MG/DL (ref 0.9–1.3)
DEPRECATED RDW RBC AUTO: 15.7 % (ref 12.4–15.4)
EKG ATRIAL RATE: 78 BPM
EKG DIAGNOSIS: NORMAL
EKG P AXIS: 44 DEGREES
EKG P-R INTERVAL: 144 MS
EKG Q-T INTERVAL: 418 MS
EKG QRS DURATION: 114 MS
EKG QTC CALCULATION (BAZETT): 476 MS
EKG R AXIS: 26 DEGREES
EKG T AXIS: 105 DEGREES
EKG VENTRICULAR RATE: 78 BPM
GFR SERPLBLD CREATININE-BSD FMLA CKD-EPI: 5 ML/MIN/{1.73_M2}
GLUCOSE BLD-MCNC: 135 MG/DL (ref 70–99)
GLUCOSE BLD-MCNC: 183 MG/DL (ref 70–99)
GLUCOSE BLD-MCNC: 312 MG/DL (ref 70–99)
GLUCOSE BLD-MCNC: 345 MG/DL (ref 70–99)
GLUCOSE BLD-MCNC: 49 MG/DL (ref 70–99)
GLUCOSE BLD-MCNC: 50 MG/DL (ref 70–99)
GLUCOSE BLD-MCNC: 56 MG/DL (ref 70–99)
GLUCOSE BLD-MCNC: 93 MG/DL (ref 70–99)
GLUCOSE SERPL-MCNC: 271 MG/DL (ref 70–99)
HCT VFR BLD AUTO: 35.8 % (ref 40.5–52.5)
HGB BLD-MCNC: 11.6 G/DL (ref 13.5–17.5)
MCH RBC QN AUTO: 29.6 PG (ref 26–34)
MCHC RBC AUTO-ENTMCNC: 32.5 G/DL (ref 31–36)
MCV RBC AUTO: 91.2 FL (ref 80–100)
PERFORMED ON: ABNORMAL
PERFORMED ON: NORMAL
PLATELET # BLD AUTO: 182 K/UL (ref 135–450)
PMV BLD AUTO: 9.4 FL (ref 5–10.5)
POTASSIUM SERPL-SCNC: 3.7 MMOL/L (ref 3.5–5.1)
RBC # BLD AUTO: 3.93 M/UL (ref 4.2–5.9)
SODIUM SERPL-SCNC: 131 MMOL/L (ref 136–145)
WBC # BLD AUTO: 3.8 K/UL (ref 4–11)

## 2025-02-21 PROCEDURE — 6360000002 HC RX W HCPCS: Performed by: INTERNAL MEDICINE

## 2025-02-21 PROCEDURE — 6370000000 HC RX 637 (ALT 250 FOR IP): Performed by: STUDENT IN AN ORGANIZED HEALTH CARE EDUCATION/TRAINING PROGRAM

## 2025-02-21 PROCEDURE — 90945 DIALYSIS ONE EVALUATION: CPT

## 2025-02-21 PROCEDURE — 85027 COMPLETE CBC AUTOMATED: CPT

## 2025-02-21 PROCEDURE — 6370000000 HC RX 637 (ALT 250 FOR IP)

## 2025-02-21 PROCEDURE — 85520 HEPARIN ASSAY: CPT

## 2025-02-21 PROCEDURE — 6370000000 HC RX 637 (ALT 250 FOR IP): Performed by: INTERNAL MEDICINE

## 2025-02-21 PROCEDURE — 36415 COLL VENOUS BLD VENIPUNCTURE: CPT

## 2025-02-21 PROCEDURE — 80048 BASIC METABOLIC PNL TOTAL CA: CPT

## 2025-02-21 PROCEDURE — 2500000003 HC RX 250 WO HCPCS: Performed by: INTERNAL MEDICINE

## 2025-02-21 PROCEDURE — 2060000000 HC ICU INTERMEDIATE R&B

## 2025-02-21 PROCEDURE — 1200000000 HC SEMI PRIVATE

## 2025-02-21 PROCEDURE — 93010 ELECTROCARDIOGRAM REPORT: CPT | Performed by: INTERNAL MEDICINE

## 2025-02-21 PROCEDURE — 99233 SBSQ HOSP IP/OBS HIGH 50: CPT | Performed by: NURSE PRACTITIONER

## 2025-02-21 PROCEDURE — 6360000002 HC RX W HCPCS: Performed by: NURSE PRACTITIONER

## 2025-02-21 RX ORDER — LACTULOSE 10 G/15ML
20 SOLUTION ORAL 3 TIMES DAILY
Status: DISPENSED | OUTPATIENT
Start: 2025-02-21 | End: 2025-02-23

## 2025-02-21 RX ORDER — POTASSIUM CHLORIDE 1500 MG/1
20 TABLET, EXTENDED RELEASE ORAL
Status: DISCONTINUED | OUTPATIENT
Start: 2025-02-21 | End: 2025-02-24

## 2025-02-21 RX ORDER — LACTULOSE 10 G/15ML
SOLUTION ORAL
Status: COMPLETED
Start: 2025-02-21 | End: 2025-02-21

## 2025-02-21 RX ADMIN — GLUCAGON 1 MG: 1 INJECTION, POWDER, LYOPHILIZED, FOR SOLUTION INTRAMUSCULAR; INTRAVENOUS at 12:05

## 2025-02-21 RX ADMIN — HEPARIN SODIUM 12 UNITS/KG/HR: 10000 INJECTION, SOLUTION INTRAVENOUS at 14:02

## 2025-02-21 RX ADMIN — METOCLOPRAMIDE HYDROCHLORIDE 10 MG: 10 TABLET ORAL at 20:58

## 2025-02-21 RX ADMIN — CARVEDILOL 12.5 MG: 6.25 TABLET, FILM COATED ORAL at 18:03

## 2025-02-21 RX ADMIN — Medication 10 ML: at 09:16

## 2025-02-21 RX ADMIN — METOCLOPRAMIDE HYDROCHLORIDE 10 MG: 10 TABLET ORAL at 18:03

## 2025-02-21 RX ADMIN — LACTULOSE 20 G: 20 SOLUTION ORAL at 12:05

## 2025-02-21 RX ADMIN — ONDANSETRON 4 MG: 2 INJECTION INTRAMUSCULAR; INTRAVENOUS at 09:13

## 2025-02-21 RX ADMIN — RANOLAZINE 500 MG: 500 TABLET, EXTENDED RELEASE ORAL at 20:58

## 2025-02-21 RX ADMIN — CALCITRIOL CAPSULES 0.25 MCG 0.25 MCG: 0.25 CAPSULE ORAL at 09:14

## 2025-02-21 RX ADMIN — ISOSORBIDE MONONITRATE 30 MG: 30 TABLET, EXTENDED RELEASE ORAL at 20:58

## 2025-02-21 RX ADMIN — ROSUVASTATIN 40 MG: 20 TABLET, FILM COATED ORAL at 20:58

## 2025-02-21 RX ADMIN — SACUBITRIL AND VALSARTAN 1 TABLET: 49; 51 TABLET, FILM COATED ORAL at 09:13

## 2025-02-21 RX ADMIN — ASPIRIN 81 MG: 81 TABLET, COATED ORAL at 09:14

## 2025-02-21 RX ADMIN — METOCLOPRAMIDE HYDROCHLORIDE 10 MG: 10 TABLET ORAL at 09:14

## 2025-02-21 RX ADMIN — HYDROMORPHONE HYDROCHLORIDE 1 MG: 1 INJECTION, SOLUTION INTRAMUSCULAR; INTRAVENOUS; SUBCUTANEOUS at 21:06

## 2025-02-21 RX ADMIN — CLOPIDOGREL BISULFATE 75 MG: 75 TABLET ORAL at 09:14

## 2025-02-21 RX ADMIN — METOCLOPRAMIDE HYDROCHLORIDE 10 MG: 10 TABLET ORAL at 12:06

## 2025-02-21 RX ADMIN — LACTULOSE 20 G: 20 SOLUTION ORAL at 20:58

## 2025-02-21 RX ADMIN — RANOLAZINE 500 MG: 500 TABLET, EXTENDED RELEASE ORAL at 09:14

## 2025-02-21 RX ADMIN — SERTRALINE HYDROCHLORIDE 50 MG: 50 TABLET ORAL at 20:58

## 2025-02-21 RX ADMIN — SACUBITRIL AND VALSARTAN 1 TABLET: 49; 51 TABLET, FILM COATED ORAL at 20:58

## 2025-02-21 RX ADMIN — POTASSIUM CHLORIDE 20 MEQ: 1500 TABLET, EXTENDED RELEASE ORAL at 09:13

## 2025-02-21 RX ADMIN — Medication 10 ML: at 20:59

## 2025-02-21 RX ADMIN — HEPARIN SODIUM 2000 UNITS: 1000 INJECTION INTRAVENOUS; SUBCUTANEOUS at 06:23

## 2025-02-21 RX ADMIN — LEVETIRACETAM 500 MG: 500 TABLET, FILM COATED ORAL at 09:13

## 2025-02-21 RX ADMIN — GENTAMICIN SULFATE: 1 CREAM TOPICAL at 18:19

## 2025-02-21 RX ADMIN — TORSEMIDE 100 MG: 100 TABLET ORAL at 09:14

## 2025-02-21 RX ADMIN — CARVEDILOL 12.5 MG: 6.25 TABLET, FILM COATED ORAL at 09:14

## 2025-02-21 ASSESSMENT — PAIN SCALES - GENERAL
PAINLEVEL_OUTOF10: 3
PAINLEVEL_OUTOF10: 2

## 2025-02-21 ASSESSMENT — PAIN DESCRIPTION - LOCATION
LOCATION: CHEST
LOCATION: FOOT
LOCATION_2: CHEST
LOCATION_2: FOOT

## 2025-02-21 ASSESSMENT — PAIN DESCRIPTION - DESCRIPTORS
DESCRIPTORS_2: STABBING
DESCRIPTORS: ACHING
DESCRIPTORS: SHARP;PRESSURE

## 2025-02-21 ASSESSMENT — PAIN DESCRIPTION - ORIENTATION
ORIENTATION_2: RIGHT
ORIENTATION: MID
ORIENTATION: LEFT
ORIENTATION_2: LEFT;MID

## 2025-02-21 ASSESSMENT — PAIN DESCRIPTION - INTENSITY
RATING_2: 2
RATING_2: 3

## 2025-02-21 NOTE — PROGRESS NOTES
CCPD Order   Exchanges: 4   Exchange Volume: 2500 ml   Total Time: 10 hrs   Dextrose: 4.25%   Total Volume: 10,000 ml     Orders verified. Supplies taken to pt's room. Report received. Cycler set up, primed and pre tested. [facility]. Pt connected to cycler. CCPD initiated without problem. Initial effluent clear.       If problems should arise please call the [phone] on top of PD cycler machine.

## 2025-02-21 NOTE — PLAN OF CARE
Problem: Chronic Conditions and Co-morbidities  Goal: Patient's chronic conditions and co-morbidity symptoms are monitored and maintained or improved  2/21/2025 0049 by Felicita Henderson RN  Outcome: Progressing  2/20/2025 1823 by Adriana Wood RN  Outcome: Progressing     Problem: Discharge Planning  Goal: Discharge to home or other facility with appropriate resources  2/21/2025 0049 by Felicita Henderson RN  Outcome: Progressing  2/20/2025 1823 by Adriana Wood RN  Outcome: Progressing     Problem: Pain  Goal: Verbalizes/displays adequate comfort level or baseline comfort level  2/21/2025 0049 by Felicita Henderson RN  Outcome: Progressing  2/20/2025 1823 by Adriana Wood RN  Outcome: Progressing     Problem: Safety - Adult  Goal: Free from fall injury  2/21/2025 0049 by Felicita Henderson RN  Outcome: Progressing  2/20/2025 1823 by Adriana Wood RN  Outcome: Progressing     Problem: ABCDS Injury Assessment  Goal: Absence of physical injury  2/21/2025 0049 by Felicita Henderson RN  Outcome: Progressing  2/20/2025 1823 by Adriana Wood RN  Outcome: Progressing     Problem: Cardiovascular - Adult  Goal: Maintains optimal cardiac output and hemodynamic stability  2/21/2025 0049 by Felicita Henderson RN  Outcome: Progressing  2/20/2025 1823 by Adriana Wood RN  Outcome: Progressing  Goal: Absence of cardiac dysrhythmias or at baseline  2/21/2025 0049 by Felicita Henderson RN  Outcome: Progressing  2/20/2025 1823 by Adriana Wood RN  Outcome: Progressing     Problem: Skin/Tissue Integrity - Adult  Goal: Skin integrity remains intact  2/21/2025 0049 by Felicita Henderson RN  Outcome: Progressing  2/20/2025 1823 by Adriana Wood RN  Outcome: Progressing  Goal: Incisions, wounds, or drain sites healing without S/S of infection  2/21/2025 0049 by Felicita Henderson RN  Outcome: Progressing  2/20/2025 1823 by Adriana Wood,  Adriana Wood RN  Outcome: Progressing     Problem: Neurosensory - Adult  Goal: Achieves stable or improved neurological status  2/21/2025 0049 by Felicita Henderson RN  Outcome: Progressing  2/20/2025 1823 by Adriana Wood RN  Outcome: Progressing     Problem: Respiratory - Adult  Goal: Achieves optimal ventilation and oxygenation  2/21/2025 0049 by Felicita Henderson RN  Outcome: Progressing  2/20/2025 1823 by Adriana Wood RN  Outcome: Progressing     Problem: Gastrointestinal - Adult  Goal: Minimal or absence of nausea and vomiting  2/21/2025 0049 by Felicita Henderson RN  Outcome: Progressing  2/20/2025 1823 by Adriana Wood RN  Outcome: Progressing  Goal: Maintains or returns to baseline bowel function  2/21/2025 0049 by Felicita Henderson RN  Outcome: Progressing  2/20/2025 1823 by Adriana Wood RN  Outcome: Progressing  Goal: Maintains adequate nutritional intake  2/21/2025 0049 by Felicita Henderson RN  Outcome: Progressing  2/20/2025 1823 by Adriana Wood RN  Outcome: Progressing     Problem: Genitourinary - Adult  Goal: Absence of urinary retention  2/21/2025 0049 by Felicita Henderson RN  Outcome: Progressing  2/20/2025 1823 by Adriana Wood RN  Outcome: Progressing     Problem: Behavior  Goal: Pt/Family maintain appropriate behavior and adhere to behavioral management agreement, if implemented  Description: INTERVENTIONS:  1. Assess patient/family's coping skills and  non-compliant behavior (including use of illegal substances)  2. Notify security of behavior or suspected illegal substances which indicate the need for search of the family and/or belongings  3. Encourage verbalization of thoughts and concerns in a socially appropriate manner  4. Utilize positive, consistent limit setting strategies supporting safety of patient, staff and others  5. Encourage participation in the decision making process about the behavioral management

## 2025-02-21 NOTE — PLAN OF CARE
Problem: Discharge Planning  Goal: Discharge to home or other facility with appropriate resources  Outcome: Progressing  Flowsheets (Taken 7/30/2024 0948)  Discharge to home or other facility with appropriate resources:   Identify barriers to discharge with patient and caregiver   Arrange for needed discharge resources and transportation as appropriate   Identify discharge learning needs (meds, wound care, etc)   Refer to discharge planning if patient needs post-hospital services based on physician order or complex needs related to functional status, cognitive ability or social support system     Problem: Pain  Goal: Verbalizes/displays adequate comfort level or baseline comfort level  Outcome: Progressing  Flowsheets (Taken 7/30/2024 0948)  Verbalizes/displays adequate comfort level or baseline comfort level:   Encourage patient to monitor pain and request assistance   Assess pain using appropriate pain scale   Implement non-pharmacological measures as appropriate and evaluate response   Administer analgesics based on type and severity of pain and evaluate response     Problem: Safety - Adult  Goal: Free from fall injury  7/30/2024 0948 by Ginger Jones RN  Outcome: Progressing  Flowsheets (Taken 7/30/2024 0948)  Free From Fall Injury:   Based on caregiver fall risk screen, instruct family/caregiver to ask for assistance with transferring infant if caregiver noted to have fall risk factors   Instruct family/caregiver on patient safety     Problem: Chronic Conditions and Co-morbidities  Goal: Patient's chronic conditions and co-morbidity symptoms are monitored and maintained or improved  7/30/2024 0948 by Ginger Jones RN  Outcome: Progressing  Flowsheets (Taken 7/30/2024 0948)  Care Plan - Patient's Chronic Conditions and Co-Morbidity Symptoms are Monitored and Maintained or Improved:   Monitor and assess patient's chronic conditions and comorbid symptoms for stability, deterioration, or improvement    [Clear to Auscultation] : lungs were clear to auscultation bilaterally [Normal Gait and Station] : normal gait and station [Normal muscle strength, symmetry and tone of facial, head and neck musculature] : normal muscle strength, symmetry and tone of facial, head and neck musculature [Normal] : no cervical lymphadenopathy [Exposed Vessel] : left anterior vessel not exposed [Wheezing] : no wheezing [Increased Work of Breathing] : no increased work of breathing with use of accessory muscles and retractions

## 2025-02-21 NOTE — PROGRESS NOTES
Nephrology Progress Note  [facility]  [phone]  [email or web address]        Subjective:   Te Garay: 59 y.o.  male who we are seeing in consultation for ESRD Management.    Brief HPI:  Te Garay is a 59 y.o. male with a PMHx of ESRD on PD, CAD/CABG/CHF, carotid artery stenosis, DM 2, diabetic neuropathy, hypertension, hyperlipidemia, seizure disorder.   He had fifth digit amputation of right foot on , reports receiving 3L of fluid on that day, then was admitted on  for planned elective LHC that was completed. We were consulted for PD management.   LHC showed SVG-OM2: 70% proximal, in-stent, 99% mid, in-stent. LVEDP 36mmHg.   Did not get PD the night of presentation.   Reports that his prescription is 10hrs, 4 cycles, 11L total, 2.2L with 2L extraneal.   He has residual function, urinates ~1L /day .  We are following for PD management.     Interval History / Subjective:  - Seen this am , reports having chest pain overnight but no chest pain this am.   - BP in good range.   - Breathing comfortably, sating 95% on room air.   - He continues to feel congested and above his dry weight.   - No issues reported with PD overnight, no issues with drainage, used 2.5% and 4.25% to optimize UF, total UF 285ml . Reports that blood sugar have been in good range.   - He had no BM for the past few days, requesting lactulose as usually would help.      Objective:   VITALS:    Vitals:    25 1230   BP:    Pulse: 79   Resp:    Temp:    SpO2:                                                                                     Temp (24hrs), Av.6 °F (36.4 °C), Min:96.9 °F (36.1 °C), Max:98.4 °F (36.9 °C)     BP  Min: 116/57  Max: 141/75                                  Pulse  Av.1  Min: 72  Max: 93    24HR INTAKE/OUTPUT:    Intake/Output Summary (Last 24 hours) at 2025 1317  Last data filed at 2025 1230  Gross per 24 hour   Intake 2384.69 ml   Output --   Net 2384.69 ml  facilitate ultrafiltration with PD  Monitor glucose levels while using 4.25% solutions.      Unstable angina   S/p LHC 2/18 showed SVG-OM2: 70% proximal, in-stent, 99% mid, in-stent.   Cardiology team following , discussing options for revascularization of SVG-OM 2     HTN  Continue with  current regimen      Anemia of chronic Kidney disease  Target Hb 9-11   Hb at goal, No need of ESAs for now as in-pt     CKD - MBD  Monitor Calcium and Phosphorus   Continue calcitriol  Low phosphorus diet      Type 1 DM  Continue insulin pump     PVD   S/p fifth digit  amputation of right foot on 2/17        Thank you very much for asking us to participate in your patient's care. Do call me if you have any questions regarding the plan of care as outlined.    ______________________________  Jordyn Gonzalez MD  [facility]  [email or web address]  Office: [phone]  2/21/2025

## 2025-02-21 NOTE — PROGRESS NOTES
Pt experienced chest pain rated 7/10 early in the shift. Applied o2 for comfort, gave PRN dilaudid, redid trops and EKG. The EKG showed no changes from previous EKG but trops were elevated at 221. Reached out to the NP and were advised that no action was needed because the patient already had a heparin gtt running.

## 2025-02-21 NOTE — PROGRESS NOTES
Disconnected from CCPD per protocol.  Effluent: clear  Total time: 10 hr 14 min   Total UF:  285 ml.  Total Volume:  9995 ml.  Dwell time gained:  0 hr 15 min.  Pt Tolerated procedure: well

## 2025-02-21 NOTE — DISCHARGE INSTR - COC
Continuity of Care Form    Patient Name: Te Garay   :  1965  MRN:  [id number]    Admit date:  2025  Discharge date:  2025    Code Status Order: Full Code   Advance Directives:   Advance Care Flowsheet Documentation             Admitting Physician:  Nathaniel Celestin MD  PCP: Reid Lei MD    Discharging [facility]-2353/5911-01  Discharging Unit Phone Number: [phone]    Emergency Contact:   Extended Emergency Contact Information  Primary Emergency Contact: Sachi Garay  Address: [address] [place] [address]  Home Phone: [phone]  Mobile Phone: [phone]  Relation: Spouse  Secondary Emergency Contact: Kendell Garay  Home Phone: [phone]  Mobile Phone: [phone]  Relation: Child    Past Surgical History:  Past Surgical History:   Procedure Laterality Date    ANKLE SURGERY Left 2024    REPAIR OF BREVIS TENDON-LEFT FOOT; COMPLEX WOUND CLOSURE-LEFT FOOT; APPLICATION BELOW KNEE SPLINT-LEFT LOWER LIMB performed by David Lowe DPM at [facility] OR    BLADDER SURGERY Left 2023    CYSTOSCOPY, LEFT URETEROSCOPY, STONE BASKET MANIPULATION, PLACEMENT OF LEFT URETERAL STENT performed by Chevy Sepulveda MD at [facility] OR    CARDIAC CATHETERIZATION      CARDIAC PROCEDURE N/A 2024    Left and right heart w coronary bypass graft performed by David Parish MD at [facility] CARDIAC CATH LAB    CARDIAC PROCEDURE N/A 2024    Left heart cath / coronary angiography with PCI performed by Larry Marin MD at [facility] CARDIAC CATH LAB    CARDIAC PROCEDURE N/A 2024    Insert stent juan coronary bypass graft performed by Larry Marin MD at [facility] CARDIAC CATH LAB    CARDIAC PROCEDURE N/A 2024    Intravascular ultrasound performed by Larry Marin MD at [facility] CARDIAC CATH LAB    CARDIAC PROCEDURE N/A 2025    Left heart cath / coronary angiography w grafts performed  restrictions  Other Medical Equipment (for information only, NOT a DME order):  walker  Other Treatments: none    Patient's personal belongings (please select all that are sent with patient):  Dentures- partial    RN SIGNATURE:  Electronically signed by Edouard Ferrera RN on 2/25/25 at 5:49 PM EST    CASE MANAGEMENT/SOCIAL WORK SECTION    Inpatient Status Date: ***    Readmission Risk Assessment Score:  [facility] RISK OF UNPLANNED READMISSION 2.0             34.2 Total Score        Discharging to Facility/ Agency   Name: [facility] [address],   [place] [address]  Phone: [phone]  Fax: [phone]       Dialysis Facility (if applicable)   Home Peritoneal Dialysis   with  [facility]  [address] , [place] [address]  Phone:  [phone]  Fax: [phone]    / signature: Electronically signed by OCHOA MENG RN on 2/28/25       PHYSICIAN SECTION    Prognosis: Good    Condition at Discharge: Stable    Rehab Potential (if transferring to Rehab):     Recommended Labs or Other Treatments After Discharge: Nursing, partial weightbearing with heel touch only to RLE, Wound care and monitoring RLE: RLE dressed with Adaptic, dry sterile gauze, Kerlix, and compressive Ace     Physician Certification: I certify the above information and transfer of Te Garay  is necessary for the continuing treatment of the diagnosis listed and that he requires Home Care for less 30 days.     Update Admission H&P: No change in H&P    PHYSICIAN SIGNATURE:  Electronically signed by Dr. Larry Fontanez via JAYLEN Pedro CNP on 2/28/25 at 1:41 PM EST

## 2025-02-21 NOTE — PROGRESS NOTES
[facility]    2/21/2025 11:04 AM        Name: Te Garay .              Admitted: 2/18/2025  Primary Care Provider: Reid Lei MD (Tel: [phone])      Chief complaint: 58 yo male with hx CAD/CABG, presented to hospital for [facility], found to have in-stent stenosis SVG-OM2. Admitted with unstable angina.      Subjective:  Resting in bed. Reports chest pain, overnight, feeling better this am. Denies shortness of breath or nausea. He tolerated breakfast.    Reviewed interval ancillary notes    Current Medications  potassium chloride (KLOR-CON M) extended release tablet 20 mEq, Daily with breakfast  gentamicin (GARAMYCIN) 0.1 % cream, Daily  HYDROmorphone HCl PF (DILAUDID) injection 1 mg, Q4H PRN  aspirin EC tablet 81 mg, Daily  levETIRAcetam (KEPPRA) tablet 500 mg, Daily  isosorbide mononitrate (IMDUR) extended release tablet 30 mg, Nightly  [START ON 2/22/2025] vitamin D (ERGOCALCIFEROL) capsule 50,000 Units, Weekly  carvedilol (COREG) tablet 12.5 mg, BID WC  sertraline (ZOLOFT) tablet 50 mg, Nightly  ranolazine (RANEXA) extended release tablet 500 mg, BID  calcitRIOL (ROCALTROL) capsule 0.25 mcg, Daily  clopidogrel (PLAVIX) tablet 75 mg, Daily  nitroGLYCERIN (NITROSTAT) SL tablet 0.4 mg, Q5 Min PRN  metoclopramide (REGLAN) tablet 10 mg, 4x Daily  torsemide (DEMADEX) tablet 100 mg, Daily  rosuvastatin (CRESTOR) tablet 40 mg, Nightly  sacubitril-valsartan (ENTRESTO) 49-51 MG per tablet 1 tablet, BID  sodium chloride flush 0.9 % injection 5-40 mL, 2 times per day  sodium chloride flush 0.9 % injection 5-40 mL, PRN  0.9 % sodium chloride infusion, PRN  acetaminophen (TYLENOL) tablet 650 mg, Q4H PRN  Insulin Pump - Basal Dose , Daily  dextrose bolus 10% 125 mL, PRN   Or  dextrose bolus 10% 250 mL, PRN  glucagon injection 1 mg, PRN  dextrose 10 % infusion, Continuous PRN  Insulin Pump - Bolus Dose , 4x Daily AC &

## 2025-02-21 NOTE — CARE COORDINATION
Case Management Assessment  Initial Evaluation    Date/Time of Evaluation: 2/21/2025 1:24 PM  Assessment Completed by: OCHOA MENG RN    If patient is discharged prior to next notation, then this note serves as note for discharge by case management.    Patient Name: Te Garay                   YOB: 1965  Diagnosis: Unstable angina (HCC) [I20.0]  Worsening angina (HCC) [I20.0]                   Date / Time: 2/18/2025  8:45 AM    Patient Admission Status: Inpatient   Readmission Risk (Low < 19, Mod (19-27), High > 27): Readmission Risk Score: 34.2    Current PCP: Reid Lei MD  PCP verified by CM? Yes    Chart Reviewed: Yes      History Provided by: Patient, Medical Record  Patient Orientation: Alert and Oriented, Person, Place, Situation    Patient Cognition: Alert    Hospitalization in the last 30 days (Readmission):  No    If yes, Readmission Assessment in CM Navigator will be completed.    Advance Directives:      Code Status: Full Code   Patient's Primary Decision Maker is: Legal Next of Kin    Primary Decision Maker: Sachi Garay - Spouse - [phone]    Secondary Decision Maker: TanishaKendell - Child - [phone]    Discharge Planning:    Patient lives with: Spouse/Significant Other Type of Home: House  Primary Care Giver: Spouse  Patient Support Systems include: Spouse/Significant Other, Children   Current Financial resources: Medicare  Current community resources: ECF/Home Care  Current services prior to admission: Durable Medical Equipment, Home Care, Other (Comment) ([facility], Peritoneal Dialysis)            Current DME: Cane, Walker, Wheelchair            Type of Home Care services:  Nursing Services    ADLS  Prior functional level: Assistance with the following:, Cooking, Housework, Shopping  Current functional level: Assistance with the following:, Cooking, Housework, Shopping    PT AM-PAC:   /24  OT AM-PAC:   /24    Family can provide assistance at DC:  discharge plan. Freedom of choice list with basic dialogue that supports the patient's individualized plan of care/goals and shares the quality data associated with the providers was provided to: Patient   Patient Representative Name:       The Patient and/or Patient Representative Agree with the Discharge Plan? Yes    OCHOA MENG, RN/BSN/CCM  Case Management Department

## 2025-02-21 NOTE — PROGRESS NOTES
SVG-OM2 with 99% ISR   ~ on plavix, aspirin, bb, statin, imdur, ranexa   Have reached out to [facility] to try to arrange consultation, for brachytherapy and complex PCI/, either on an inpatient or outpatient basis pending clinical course.      Ischemic cardiomyopathy/ CHF   ~ Echo 1/2025 EF 30-35%  ~ entresto 49/51, coreg 12.5mg BID, torsemide 100 daily  ~ Evaluating for revascularization of SVG-OM 2 versus possible  revascularization of circumflex  -Refer to [facility] Dr. Camara     PAD/ gangrenous toe   ~ s/ right foot surgery 2/17     ESRD  ~ on PD  ~ fluid overloaded, management with PD  ~ per nephrology      Carotid artery stenosis- s/p Rt carotid stent 10/2016; s/p Rt carotid POBA to ISR 5/2022  HTN- stable  HLD- on statin   T1DM- A1C 8.0  Hx TIA     Multiple medical conditions with risk of decompensation.   All pertinent information and plan of care discussed with the physician.  All questions and concerns were addressed to the patient. Alternatives to my treatment were discussed. I have discussed the above stated plan with patient and spouse and the nurse. The patient and spouse verbalized understanding and agreed with the plan.    Thank you for allowing to us to participate in the care of Te ZENA Garay.    Total visit time > 52 minutes; > 50% spend counseling / coordinating care. I reviewed interval history, physical exam, review of data including labs, imaging, development and implementation of treatment plan and coordination of complex care. Counseled on risk factor modifications.     Shania [facility] [place]   Office: [phone]    NOTE:  This report was transcribed using voice recognition software.  Every effort was made to ensure accuracy; however, inadvertent computerized transcription errors may be present.

## 2025-02-22 PROBLEM — I25.84 CORONARY ARTERY DISEASE DUE TO CALCIFIED CORONARY LESION: Status: ACTIVE | Noted: 2024-07-28

## 2025-02-22 LAB
ANTI-XA UNFRAC HEPARIN: 0.4 IU/ML (ref 0.3–0.7)
DEPRECATED RDW RBC AUTO: 15.5 % (ref 12.4–15.4)
GLUCOSE BLD-MCNC: 117 MG/DL (ref 70–99)
GLUCOSE BLD-MCNC: 154 MG/DL (ref 70–99)
GLUCOSE BLD-MCNC: 327 MG/DL (ref 70–99)
GLUCOSE BLD-MCNC: 60 MG/DL (ref 70–99)
GLUCOSE BLD-MCNC: 70 MG/DL (ref 70–99)
GLUCOSE BLD-MCNC: 85 MG/DL (ref 70–99)
HCT VFR BLD AUTO: 33.7 % (ref 40.5–52.5)
HGB BLD-MCNC: 11 G/DL (ref 13.5–17.5)
MCH RBC QN AUTO: 29.3 PG (ref 26–34)
MCHC RBC AUTO-ENTMCNC: 32.7 G/DL (ref 31–36)
MCV RBC AUTO: 89.8 FL (ref 80–100)
PERFORMED ON: ABNORMAL
PERFORMED ON: NORMAL
PERFORMED ON: NORMAL
PLATELET # BLD AUTO: 217 K/UL (ref 135–450)
PMV BLD AUTO: 9.5 FL (ref 5–10.5)
RBC # BLD AUTO: 3.75 M/UL (ref 4.2–5.9)
WBC # BLD AUTO: 4.2 K/UL (ref 4–11)

## 2025-02-22 PROCEDURE — 6370000000 HC RX 637 (ALT 250 FOR IP): Performed by: NURSE PRACTITIONER

## 2025-02-22 PROCEDURE — 6360000002 HC RX W HCPCS: Performed by: NURSE PRACTITIONER

## 2025-02-22 PROCEDURE — 99232 SBSQ HOSP IP/OBS MODERATE 35: CPT | Performed by: CLINICAL NURSE SPECIALIST

## 2025-02-22 PROCEDURE — 90945 DIALYSIS ONE EVALUATION: CPT

## 2025-02-22 PROCEDURE — 6370000000 HC RX 637 (ALT 250 FOR IP): Performed by: STUDENT IN AN ORGANIZED HEALTH CARE EDUCATION/TRAINING PROGRAM

## 2025-02-22 PROCEDURE — 6370000000 HC RX 637 (ALT 250 FOR IP): Performed by: INTERNAL MEDICINE

## 2025-02-22 PROCEDURE — 85520 HEPARIN ASSAY: CPT

## 2025-02-22 PROCEDURE — 2500000003 HC RX 250 WO HCPCS: Performed by: INTERNAL MEDICINE

## 2025-02-22 PROCEDURE — 85027 COMPLETE CBC AUTOMATED: CPT

## 2025-02-22 PROCEDURE — 6360000002 HC RX W HCPCS: Performed by: INTERNAL MEDICINE

## 2025-02-22 PROCEDURE — 1200000000 HC SEMI PRIVATE

## 2025-02-22 PROCEDURE — 2060000000 HC ICU INTERMEDIATE R&B

## 2025-02-22 RX ORDER — TRAMADOL HYDROCHLORIDE 50 MG/1
25 TABLET ORAL EVERY 6 HOURS PRN
Status: DISCONTINUED | OUTPATIENT
Start: 2025-02-22 | End: 2025-02-28 | Stop reason: HOSPADM

## 2025-02-22 RX ADMIN — Medication 10 ML: at 10:12

## 2025-02-22 RX ADMIN — SERTRALINE HYDROCHLORIDE 50 MG: 50 TABLET ORAL at 20:16

## 2025-02-22 RX ADMIN — POTASSIUM CHLORIDE 20 MEQ: 1500 TABLET, EXTENDED RELEASE ORAL at 10:11

## 2025-02-22 RX ADMIN — ASPIRIN 81 MG: 81 TABLET, COATED ORAL at 10:10

## 2025-02-22 RX ADMIN — LACTULOSE 20 G: 20 SOLUTION ORAL at 14:57

## 2025-02-22 RX ADMIN — CLOPIDOGREL BISULFATE 75 MG: 75 TABLET ORAL at 10:11

## 2025-02-22 RX ADMIN — METOCLOPRAMIDE HYDROCHLORIDE 10 MG: 10 TABLET ORAL at 20:16

## 2025-02-22 RX ADMIN — LACTULOSE 20 G: 20 SOLUTION ORAL at 10:15

## 2025-02-22 RX ADMIN — ONDANSETRON 4 MG: 2 INJECTION INTRAMUSCULAR; INTRAVENOUS at 10:20

## 2025-02-22 RX ADMIN — METOCLOPRAMIDE HYDROCHLORIDE 10 MG: 10 TABLET ORAL at 17:47

## 2025-02-22 RX ADMIN — HEPARIN SODIUM 12 UNITS/KG/HR: 10000 INJECTION, SOLUTION INTRAVENOUS at 00:07

## 2025-02-22 RX ADMIN — CALCITRIOL CAPSULES 0.25 MCG 0.25 MCG: 0.25 CAPSULE ORAL at 10:11

## 2025-02-22 RX ADMIN — SACUBITRIL AND VALSARTAN 1 TABLET: 49; 51 TABLET, FILM COATED ORAL at 20:16

## 2025-02-22 RX ADMIN — HYDROMORPHONE HYDROCHLORIDE 1 MG: 1 INJECTION, SOLUTION INTRAMUSCULAR; INTRAVENOUS; SUBCUTANEOUS at 07:26

## 2025-02-22 RX ADMIN — TORSEMIDE 100 MG: 100 TABLET ORAL at 10:11

## 2025-02-22 RX ADMIN — RANOLAZINE 500 MG: 500 TABLET, EXTENDED RELEASE ORAL at 20:16

## 2025-02-22 RX ADMIN — METOCLOPRAMIDE HYDROCHLORIDE 10 MG: 10 TABLET ORAL at 10:11

## 2025-02-22 RX ADMIN — SACUBITRIL AND VALSARTAN 1 TABLET: 49; 51 TABLET, FILM COATED ORAL at 10:19

## 2025-02-22 RX ADMIN — Medication 10 ML: at 20:16

## 2025-02-22 RX ADMIN — RANOLAZINE 500 MG: 500 TABLET, EXTENDED RELEASE ORAL at 10:11

## 2025-02-22 RX ADMIN — ISOSORBIDE MONONITRATE 30 MG: 30 TABLET, EXTENDED RELEASE ORAL at 20:16

## 2025-02-22 RX ADMIN — ERGOCALCIFEROL 50000 UNITS: 1.25 CAPSULE ORAL at 10:10

## 2025-02-22 RX ADMIN — ROSUVASTATIN 40 MG: 20 TABLET, FILM COATED ORAL at 20:16

## 2025-02-22 RX ADMIN — LEVETIRACETAM 500 MG: 500 TABLET, FILM COATED ORAL at 10:11

## 2025-02-22 RX ADMIN — METOCLOPRAMIDE HYDROCHLORIDE 10 MG: 10 TABLET ORAL at 12:16

## 2025-02-22 RX ADMIN — TRAMADOL HYDROCHLORIDE 25 MG: 50 TABLET, COATED ORAL at 20:23

## 2025-02-22 RX ADMIN — TRAMADOL HYDROCHLORIDE 25 MG: 50 TABLET, COATED ORAL at 12:16

## 2025-02-22 RX ADMIN — CARVEDILOL 12.5 MG: 6.25 TABLET, FILM COATED ORAL at 10:11

## 2025-02-22 RX ADMIN — CARVEDILOL 12.5 MG: 6.25 TABLET, FILM COATED ORAL at 17:49

## 2025-02-22 ASSESSMENT — PAIN DESCRIPTION - LOCATION
LOCATION: FOOT
LOCATION_2: CHEST
LOCATION: FOOT
LOCATION: FOOT
LOCATION_2: CHEST
LOCATION: FOOT
LOCATION: FOOT

## 2025-02-22 ASSESSMENT — PAIN SCALES - GENERAL
PAINLEVEL_OUTOF10: 0
PAINLEVEL_OUTOF10: 7
PAINLEVEL_OUTOF10: 0
PAINLEVEL_OUTOF10: 5
PAINLEVEL_OUTOF10: 5
PAINLEVEL_OUTOF10: 1

## 2025-02-22 ASSESSMENT — PAIN DESCRIPTION - DESCRIPTORS
DESCRIPTORS: SHARP;THROBBING
DESCRIPTORS: THROBBING
DESCRIPTORS_2: PRESSURE
DESCRIPTORS: STABBING;THROBBING
DESCRIPTORS: ACHING;SORE
DESCRIPTORS_2: PRESSURE

## 2025-02-22 ASSESSMENT — PAIN DESCRIPTION - ORIENTATION
ORIENTATION: RIGHT

## 2025-02-22 ASSESSMENT — PAIN DESCRIPTION - INTENSITY
RATING_2: 1
RATING_2: 1

## 2025-02-22 ASSESSMENT — PAIN DESCRIPTION - PAIN TYPE
TYPE: SURGICAL PAIN
TYPE: SURGICAL PAIN

## 2025-02-22 NOTE — CONSULTS
[facility] ([facility])   Nephrology Consult Note  [phone]  [email or web address]        Patient: Te Garay    MRN: [id number]    : 1965     Reason for Consult:    ESRD Management       Requesting Provider: Dr. Celestin    History of Present Illness / Subjective:    Te Garay is a 59 y.o. male with a PMHx of ESRD on PD, CAD/CABG/CHF, carotid artery stenosis, DM 2, diabetic neuropathy, hypertension, hyperlipidemia, seizure disorder.   He had fifth digit of amputation of right foot on , then was admitted on  for planned elective LHC that was completed. We were consulted for PD management.   LHC showed SVG-OM2: 70% proximal, in-stent, 99% mid, in-stent. LVEDP 36mmHg  He reports that last PD treatment was on Monday night, did not get it overnight.   Reports that his prescription is 10hrs, 4 cycles, 11L total, 2.2L with 2L extraneal.   He has residual function, urinates ~1L /day .  Extraneal not drained since Monday morning, as did not get PD overnight.   He reports nausea and vomiting episodes since last night only. No chest pain. No SOB. No abdominal pain. He reports received about 3L of fluid during his foot surgery on .       Chart reviewed.  Patient was seen and examined.       Past Medical History   Active Ambulatory Problems     Diagnosis Date Noted    HLD (hyperlipidemia)     PAD (peripheral artery disease)     Chronic systolic heart failure (HCC)     ESRD on peritoneal dialysis (HCC)     Septicemia ([facility]) 2021    Generalized abdominal pain     Staphylococcus epidermidis infection 2023    Seizure disorder ([facility]) 2023    Pancytopenia ([facility]) 2024    Primary hypertension 2019    Type 1 diabetes mellitus (HCC) 2024    Cardiomyopathy ([facility]) 2024    Coronary artery disease of autologous vein bypass graft with stable angina pectoris 2024    Anemia due to stage 5 chronic kidney disease, not on chronic dialysis ([facility]) 2024    
differently: Take 1 tablet by mouth at bedtime) 90 tablet 2    aspirin 81 MG EC tablet Take 1 tablet by mouth daily 90 tablet 0    levETIRAcetam (KEPPRA) 500 MG tablet Take 1 tablet by mouth daily 60 tablet 3    insulin lispro (HUMALOG) 100 UNIT/ML SOLN injection vial Use 30-40 units daily via pump 20 mL 2       Current Meds  traMADol (ULTRAM) tablet 25 mg, Q6H PRN  potassium chloride (KLOR-CON M) extended release tablet 20 mEq, Daily with breakfast  lactulose (CHRONULAC) 10 GM/15ML solution 20 g, TID  gentamicin (GARAMYCIN) 0.1 % cream, Daily  HYDROmorphone HCl PF (DILAUDID) injection 1 mg, Q4H PRN  aspirin EC tablet 81 mg, Daily  levETIRAcetam (KEPPRA) tablet 500 mg, Daily  isosorbide mononitrate (IMDUR) extended release tablet 30 mg, Nightly  vitamin D (ERGOCALCIFEROL) capsule 50,000 Units, Weekly  carvedilol (COREG) tablet 12.5 mg, BID WC  sertraline (ZOLOFT) tablet 50 mg, Nightly  ranolazine (RANEXA) extended release tablet 500 mg, BID  calcitRIOL (ROCALTROL) capsule 0.25 mcg, Daily  clopidogrel (PLAVIX) tablet 75 mg, Daily  nitroGLYCERIN (NITROSTAT) SL tablet 0.4 mg, Q5 Min PRN  metoclopramide (REGLAN) tablet 10 mg, 4x Daily  torsemide (DEMADEX) tablet 100 mg, Daily  rosuvastatin (CRESTOR) tablet 40 mg, Nightly  sacubitril-valsartan (ENTRESTO) 49-51 MG per tablet 1 tablet, BID  sodium chloride flush 0.9 % injection 5-40 mL, 2 times per day  sodium chloride flush 0.9 % injection 5-40 mL, PRN  0.9 % sodium chloride infusion, PRN  acetaminophen (TYLENOL) tablet 650 mg, Q4H PRN  Insulin Pump - Basal Dose , Daily  dextrose bolus 10% 125 mL, PRN   Or  dextrose bolus 10% 250 mL, PRN  glucagon injection 1 mg, PRN  dextrose 10 % infusion, Continuous PRN  Insulin Pump - Bolus Dose , 4x Daily AC & HS  heparin (porcine) injection 4,000 Units, PRN  heparin (porcine) injection 2,000 Units, PRN  heparin 25,000 units in dextrose 5% 250 mL (premix) infusion, Continuous  ondansetron (ZOFRAN) injection 4 mg, Q6H

## 2025-02-22 NOTE — PROGRESS NOTES
[facility]   Daily Progress Note      Admit Date:  2/18/2025    HPI:    Mr. Garay is a 59 year old male with history of CAD/CABG, ESRD on PD, DM, PVD status post digit amputation right foot 2/17/2025    He presented to the hospital for LHC, instent stenosis SVG to OM2.  He had a toe amputation on 2/17/2025.  He needs brachytherapy and complex PCI/ either as inpatient or outpatient basis       Subjective:  Patient is being seen for CAD. There were no acute overnight cardiac events. He is sitting up in the bed with his son at his side.  He took off fluid last night with his PD and breathing much improved.  No chest pain  Weight 188-177    Objective:   BP 94/70   Pulse 88   Temp 97.9 °F (36.6 °C) (Oral)   Resp 16   Ht 1.727 m (5' 8\")   Wt 80.7 kg (177 lb 14.6 oz)   SpO2 100%   BMI 27.05 kg/m²     Intake/Output Summary (Last 24 hours) at 2/22/2025 0952  Last data filed at 2/22/2025 0007  Gross per 24 hour   Intake 784.3 ml   Output --   Net 784.3 ml          Physical Exam:  General:  Awake, alert, oriented in NAD  Skin:  Warm and dry.  No unusual bruising or rash  Neck:  Supple.  No JVD or carotid bruit appreciated  Chest:  Normal effort.  Clear to auscultation, no wheezes/rhonchi/rales  Cardiovascular:  RRR, S1/S2, no murmur/gallop/rub  Abdomen:  Soft, nontender, +bowel sounds, PD catheter  Extremities:  No edema  Neurological: No focal deficits  Psychological: Normal mood and affect      Medications:    potassium chloride  20 mEq Oral Daily with breakfast    lactulose  20 g Oral TID    gentamicin   Topical Daily    aspirin  81 mg Oral Daily    levETIRAcetam  500 mg Oral Daily    isosorbide mononitrate  30 mg Oral Nightly    vitamin D  50,000 Units Oral Weekly    carvedilol  12.5 mg Oral BID WC    sertraline  50 mg Oral Nightly    ranolazine  500 mg Oral BID    calcitRIOL  0.25 mcg Oral Daily    clopidogrel  75 mg Oral Daily    metoclopramide  10 mg Oral 4x Daily    torsemide  100 mg Oral

## 2025-02-22 NOTE — PROGRESS NOTES
[facility]   Nephrology progress Note  [phone]  [phone]   [employer]         Reason for Consult:  ESRD    HISTORY OF PRESENT ILLNESS:      Te Garay is a 59 y.o. male with a PMHx of ESRD on PD, CAD/CABG/CHF, carotid artery stenosis, DM 2, diabetic neuropathy, hypertension, hyperlipidemia, seizure disorder.   He had fifth digit amputation of right foot on 2/17, reports receiving 3L of fluid on that day, then was admitted on 2/18 for planned elective LHC that was completed. We were consulted for PD management.   LHC showed SVG-OM2: 70% proximal, in-stent, 99% mid, in-stent. LVEDP 36mmHg.   Did not get PD the night of presentation.   Reports that his prescription is 10hrs, 4 cycles, 11L total, 2.2L with 2L extraneal.   He has residual function, urinates ~1L /day .  We are following for PD management.     Interval history:   Dialyzed with 4.25% dextrose last night. 1.9L UF noted.   Feels better today      PHYSICAL EXAM:    Vitals:    /64   Pulse 79   Temp 97.1 °F (36.2 °C) (Temporal)   Resp 16   Ht 1.727 m (5' 8\")   Wt 80.7 kg (177 lb 14.6 oz)   SpO2 96%   BMI 27.05 kg/m²   I/O last 3 completed shifts:  In: 1867.9 [P.O.:1560; I.V.:307.9]  Out: 300 [Urine:300]  No intake/output data recorded.    Physical Exam:  Gen:  alert, oriented x 3  PERRL , EOM +  Pallor +, No icterus  JVP not raised   CV: RRR no murmur or rub .  Lungs:B/ L air entry, Normal breath sounds   Abd: soft, bowel sounds + , No organomegaly   Ext: No edema, no cyanosis  Skin: Warm.  No rash  Neuro: nonfocal.      DATA:    CBC:   Lab Results   Component Value Date/Time    WBC 4.2 02/22/2025 02:09 AM    RBC 3.75 02/22/2025 02:09 AM    HGB 11.0 02/22/2025 02:09 AM    HCT 33.7 02/22/2025 02:09 AM    MCV 89.8 02/22/2025 02:09 AM    MCH 29.3 02/22/2025 02:09 AM    MCHC 32.7 02/22/2025 02:09 AM    RDW 15.5 02/22/2025 02:09 AM     02/22/2025 02:09 AM    MPV 9.5 02/22/2025 02:09 AM     BMP:    Lab

## 2025-02-22 NOTE — PROGRESS NOTES
[facility]    2/22/2025 11:13 AM        Name: Te Garay .              Admitted: 2/18/2025  Primary Care Provider: Reid Lei MD (Tel: [phone])      Chief complaint: 60 yo male with hx CAD/CABG, presented to hospital for [facility], found to have in-stent stenosis SVG-OM2. Admitted with unstable angina.      Subjective:  Resting in bed, son visiting. Says he is feeling better since they removed extra fluid overnight. Had some chest tightness overnight but was mild, none this am. Denies shortness of breath, orthopnea. Reports some pain in right foot.      Reviewed interval ancillary notes    Current Medications  potassium chloride (KLOR-CON M) extended release tablet 20 mEq, Daily with breakfast  lactulose (CHRONULAC) 10 GM/15ML solution 20 g, TID  gentamicin (GARAMYCIN) 0.1 % cream, Daily  HYDROmorphone HCl PF (DILAUDID) injection 1 mg, Q4H PRN  aspirin EC tablet 81 mg, Daily  levETIRAcetam (KEPPRA) tablet 500 mg, Daily  isosorbide mononitrate (IMDUR) extended release tablet 30 mg, Nightly  vitamin D (ERGOCALCIFEROL) capsule 50,000 Units, Weekly  carvedilol (COREG) tablet 12.5 mg, BID WC  sertraline (ZOLOFT) tablet 50 mg, Nightly  ranolazine (RANEXA) extended release tablet 500 mg, BID  calcitRIOL (ROCALTROL) capsule 0.25 mcg, Daily  clopidogrel (PLAVIX) tablet 75 mg, Daily  nitroGLYCERIN (NITROSTAT) SL tablet 0.4 mg, Q5 Min PRN  metoclopramide (REGLAN) tablet 10 mg, 4x Daily  torsemide (DEMADEX) tablet 100 mg, Daily  rosuvastatin (CRESTOR) tablet 40 mg, Nightly  sacubitril-valsartan (ENTRESTO) 49-51 MG per tablet 1 tablet, BID  sodium chloride flush 0.9 % injection 5-40 mL, 2 times per day  sodium chloride flush 0.9 % injection 5-40 mL, PRN  0.9 % sodium chloride infusion, PRN  acetaminophen (TYLENOL) tablet 650 mg, Q4H PRN  Insulin Pump - Basal Dose , Daily  dextrose bolus 10% 125 mL, PRN   Or  dextrose  bolus 10% 250 mL, PRN  glucagon injection 1 mg, PRN  dextrose 10 % infusion, Continuous PRN  Insulin Pump - Bolus Dose , 4x Daily AC & HS  heparin (porcine) injection 4,000 Units, PRN  heparin (porcine) injection 2,000 Units, PRN  heparin 25,000 units in dextrose 5% 250 mL (premix) infusion, Continuous  ondansetron (ZOFRAN) injection 4 mg, Q6H PRN        Objective:  /64   Pulse 84   Temp 97.1 °F (36.2 °C) (Temporal)   Resp 16   Ht 1.727 m (5' 8\")   Wt 80.7 kg (177 lb 14.6 oz)   SpO2 100%   BMI 27.05 kg/m²     Intake/Output Summary (Last 24 hours) at 2/22/2025 1113  Last data filed at 2/22/2025 0007  Gross per 24 hour   Intake 784.3 ml   Output --   Net 784.3 ml      Wt Readings from Last 3 Encounters:   02/22/25 80.7 kg (177 lb 14.6 oz)   02/17/25 84.8 kg (187 lb)   01/31/25 84.7 kg (186 lb 12.8 oz)     General:  Awake, alert, oriented in NAD  Skin:  Warm and dry.  No unusual bruising or rash  Neck:  Supple.  NO JVD appreciated  Chest:  Normal effort.  Clear to auscultation, no wheezes/rhonchi/rales  Cardiovascular:  RRR, normal S1/S2, no murmur/gallop/rub  Abdomen:  Soft, nontender, +bowel sounds  Extremities:  No edema, dressing and potop boot intact right foot  Neurological: No focal deficits  Psychological: Normal mood and affect      Labs and Tests:  CBC:   Recent Labs     02/20/25  0652 02/21/25  0446 02/22/25  0209   WBC 4.7 3.8* 4.2   HGB 10.5* 11.6* 11.0*    182 217     BMP:    Recent Labs     02/20/25  0652 02/21/25  0446   * 131*   K 3.4* 3.7   CL 90* 92*   CO2 26 24   BUN 58* 55*   CREATININE 10.3* 10.2*   GLUCOSE 225* 271*     Hepatic:   No results for input(s): \"AST\", \"ALT\", \"BILITOT\", \"ALKPHOS\" in the last 72 hours.    Invalid input(s): \"ALB\"      Imaging  CT ABDOMEN PELVIS WO CONTRAST Additional Contrast? None   Final Result   1. Ascites   2. Cholelithiasis.   3. Drainage catheter in the pelvis.   4. No acute abdominal or pelvic finding.   5. Overall little change, compared

## 2025-02-22 NOTE — PROGRESS NOTES
Treatment time:  10 Hours  27 minutes  Net UF: 1909  ml  Dwell Time: 33 minutes Gained     Treatment completed without complications or complaints from patient. Effluent clear yellow and lines taped to patient per protocol. Patient resting comfortably with VSS upon exiting room.      Copy of dialysis treatment record placed in chart, to be scanned into EMR and report given to RN.

## 2025-02-22 NOTE — PLAN OF CARE
Podiatric Surgery Plan of Care Note  Te Garay     Consult received, patient examined and evaluated at bedside.  No clinical signs of infection noted.  Patient stable for transfer from podiatric standpoint.  Full consult note to follow    Jeffery Villela DPM  Chief Podiatric Resident PGY-3  Pager [phone] or [employer]

## 2025-02-22 NOTE — PLAN OF CARE
Problem: Chronic Conditions and Co-morbidities  Goal: Patient's chronic conditions and co-morbidity symptoms are monitored and maintained or improved  2/21/2025 2249 by Yamila Guerra, RN  Outcome: Progressing     Problem: Pain  Goal: Verbalizes/displays adequate comfort level or baseline comfort level  2/21/2025 2249 by Yamila Guerra, RN  Outcome: Progressing     Problem: Safety - Adult  Goal: Free from fall injury  2/21/2025 2249 by Yamila Guerra, RN  Outcome: Progressing

## 2025-02-23 LAB
ANION GAP SERPL CALCULATED.3IONS-SCNC: 16 MMOL/L (ref 3–16)
ANTI-XA UNFRAC HEPARIN: 0.53 IU/ML (ref 0.3–0.7)
BUN SERPL-MCNC: 50 MG/DL (ref 7–20)
CALCIUM SERPL-MCNC: 9.1 MG/DL (ref 8.3–10.6)
CHLORIDE SERPL-SCNC: 93 MMOL/L (ref 99–110)
CO2 SERPL-SCNC: 26 MMOL/L (ref 21–32)
CREAT SERPL-MCNC: 10.3 MG/DL (ref 0.9–1.3)
GFR SERPLBLD CREATININE-BSD FMLA CKD-EPI: 5 ML/MIN/{1.73_M2}
GLUCOSE BLD-MCNC: 106 MG/DL (ref 70–99)
GLUCOSE BLD-MCNC: 177 MG/DL (ref 70–99)
GLUCOSE BLD-MCNC: 183 MG/DL (ref 70–99)
GLUCOSE SERPL-MCNC: 186 MG/DL (ref 70–99)
PERFORMED ON: ABNORMAL
POTASSIUM SERPL-SCNC: 4.3 MMOL/L (ref 3.5–5.1)
SODIUM SERPL-SCNC: 135 MMOL/L (ref 136–145)

## 2025-02-23 PROCEDURE — 85520 HEPARIN ASSAY: CPT

## 2025-02-23 PROCEDURE — 6360000002 HC RX W HCPCS: Performed by: INTERNAL MEDICINE

## 2025-02-23 PROCEDURE — 80048 BASIC METABOLIC PNL TOTAL CA: CPT

## 2025-02-23 PROCEDURE — 36415 COLL VENOUS BLD VENIPUNCTURE: CPT

## 2025-02-23 PROCEDURE — 6370000000 HC RX 637 (ALT 250 FOR IP): Performed by: STUDENT IN AN ORGANIZED HEALTH CARE EDUCATION/TRAINING PROGRAM

## 2025-02-23 PROCEDURE — 6370000000 HC RX 637 (ALT 250 FOR IP): Performed by: INTERNAL MEDICINE

## 2025-02-23 PROCEDURE — 99232 SBSQ HOSP IP/OBS MODERATE 35: CPT | Performed by: CLINICAL NURSE SPECIALIST

## 2025-02-23 PROCEDURE — 6370000000 HC RX 637 (ALT 250 FOR IP): Performed by: NURSE PRACTITIONER

## 2025-02-23 PROCEDURE — 90945 DIALYSIS ONE EVALUATION: CPT

## 2025-02-23 PROCEDURE — 2060000000 HC ICU INTERMEDIATE R&B

## 2025-02-23 PROCEDURE — 2500000003 HC RX 250 WO HCPCS: Performed by: INTERNAL MEDICINE

## 2025-02-23 PROCEDURE — 6360000002 HC RX W HCPCS: Performed by: NURSE PRACTITIONER

## 2025-02-23 RX ADMIN — LEVETIRACETAM 500 MG: 500 TABLET, FILM COATED ORAL at 09:24

## 2025-02-23 RX ADMIN — HEPARIN SODIUM 12 UNITS/KG/HR: 10000 INJECTION, SOLUTION INTRAVENOUS at 02:00

## 2025-02-23 RX ADMIN — HYDROMORPHONE HYDROCHLORIDE 1 MG: 1 INJECTION, SOLUTION INTRAMUSCULAR; INTRAVENOUS; SUBCUTANEOUS at 12:49

## 2025-02-23 RX ADMIN — TORSEMIDE 100 MG: 100 TABLET ORAL at 09:24

## 2025-02-23 RX ADMIN — RANOLAZINE 500 MG: 500 TABLET, EXTENDED RELEASE ORAL at 19:42

## 2025-02-23 RX ADMIN — METOCLOPRAMIDE HYDROCHLORIDE 10 MG: 10 TABLET ORAL at 12:49

## 2025-02-23 RX ADMIN — Medication 10 ML: at 09:25

## 2025-02-23 RX ADMIN — ASPIRIN 81 MG: 81 TABLET, COATED ORAL at 09:23

## 2025-02-23 RX ADMIN — METOCLOPRAMIDE HYDROCHLORIDE 10 MG: 10 TABLET ORAL at 19:42

## 2025-02-23 RX ADMIN — SACUBITRIL AND VALSARTAN 1 TABLET: 49; 51 TABLET, FILM COATED ORAL at 19:42

## 2025-02-23 RX ADMIN — CLOPIDOGREL BISULFATE 75 MG: 75 TABLET ORAL at 09:24

## 2025-02-23 RX ADMIN — ISOSORBIDE MONONITRATE 30 MG: 30 TABLET, EXTENDED RELEASE ORAL at 19:42

## 2025-02-23 RX ADMIN — HYDROMORPHONE HYDROCHLORIDE 1 MG: 1 INJECTION, SOLUTION INTRAMUSCULAR; INTRAVENOUS; SUBCUTANEOUS at 06:26

## 2025-02-23 RX ADMIN — CARVEDILOL 12.5 MG: 6.25 TABLET, FILM COATED ORAL at 09:24

## 2025-02-23 RX ADMIN — HYDROMORPHONE HYDROCHLORIDE 1 MG: 1 INJECTION, SOLUTION INTRAMUSCULAR; INTRAVENOUS; SUBCUTANEOUS at 18:13

## 2025-02-23 RX ADMIN — Medication 10 ML: at 19:33

## 2025-02-23 RX ADMIN — SERTRALINE HYDROCHLORIDE 50 MG: 50 TABLET ORAL at 19:42

## 2025-02-23 RX ADMIN — ONDANSETRON 4 MG: 2 INJECTION INTRAMUSCULAR; INTRAVENOUS at 19:32

## 2025-02-23 RX ADMIN — CALCITRIOL CAPSULES 0.25 MCG 0.25 MCG: 0.25 CAPSULE ORAL at 09:24

## 2025-02-23 RX ADMIN — METOCLOPRAMIDE HYDROCHLORIDE 10 MG: 10 TABLET ORAL at 18:17

## 2025-02-23 RX ADMIN — POTASSIUM CHLORIDE 20 MEQ: 1500 TABLET, EXTENDED RELEASE ORAL at 09:23

## 2025-02-23 RX ADMIN — CARVEDILOL 12.5 MG: 6.25 TABLET, FILM COATED ORAL at 18:16

## 2025-02-23 RX ADMIN — TRAMADOL HYDROCHLORIDE 25 MG: 50 TABLET, COATED ORAL at 03:32

## 2025-02-23 RX ADMIN — ONDANSETRON 4 MG: 2 INJECTION INTRAMUSCULAR; INTRAVENOUS at 04:26

## 2025-02-23 RX ADMIN — ROSUVASTATIN 40 MG: 20 TABLET, FILM COATED ORAL at 19:42

## 2025-02-23 RX ADMIN — RANOLAZINE 500 MG: 500 TABLET, EXTENDED RELEASE ORAL at 09:23

## 2025-02-23 RX ADMIN — SACUBITRIL AND VALSARTAN 1 TABLET: 49; 51 TABLET, FILM COATED ORAL at 09:23

## 2025-02-23 RX ADMIN — METOCLOPRAMIDE HYDROCHLORIDE 10 MG: 10 TABLET ORAL at 09:25

## 2025-02-23 ASSESSMENT — PAIN DESCRIPTION - DESCRIPTORS
DESCRIPTORS: STABBING;THROBBING
DESCRIPTORS: STABBING;THROBBING

## 2025-02-23 ASSESSMENT — PAIN SCALES - GENERAL
PAINLEVEL_OUTOF10: 6
PAINLEVEL_OUTOF10: 9
PAINLEVEL_OUTOF10: 5
PAINLEVEL_OUTOF10: 7

## 2025-02-23 ASSESSMENT — PAIN DESCRIPTION - LOCATION
LOCATION: FOOT
LOCATION: FOOT

## 2025-02-23 ASSESSMENT — PAIN DESCRIPTION - ORIENTATION
ORIENTATION: RIGHT
ORIENTATION: RIGHT

## 2025-02-23 ASSESSMENT — PAIN DESCRIPTION - PAIN TYPE
TYPE: SURGICAL PAIN
TYPE: SURGICAL PAIN

## 2025-02-23 NOTE — PLAN OF CARE
Problem: Chronic Conditions and Co-morbidities  Goal: Patient's chronic conditions and co-morbidity symptoms are monitored and maintained or improved  Outcome: Progressing     Problem: Chronic Conditions and Co-morbidities  Goal: Patient's chronic conditions and co-morbidity symptoms are monitored and maintained or improved  Outcome: Progressing     Problem: Pain  Goal: Verbalizes/displays adequate comfort level or baseline comfort level  Outcome: Progressing     Problem: ABCDS Injury Assessment  Goal: Absence of physical injury  Outcome: Progressing

## 2025-02-23 NOTE — PROGRESS NOTES
1900: pt with no reports of chest pain. Pt has incisional pain on R foot from toe amp. Pt education of partial R foot weight bearing restriction. Pt up do date with plan and has no further questions, call light within reach and pt comfortable.

## 2025-02-23 NOTE — PROGRESS NOTES
[facility]   Nephrology progress Note  [phone]  [phone]   [email or web address]         Reason for Consult:  ESRD    HISTORY OF PRESENT ILLNESS:      Te Garay is a 59 y.o. male with a PMHx of ESRD on PD, CAD/CABG/CHF, carotid artery stenosis, DM 2, diabetic neuropathy, hypertension, hyperlipidemia, seizure disorder.   He had fifth digit amputation of right foot on 2/17, reports receiving 3L of fluid on that day, then was admitted on 2/18 for planned elective LHC that was completed. We were consulted for PD management.   LHC showed SVG-OM2: 70% proximal, in-stent, 99% mid, in-stent. LVEDP 36mmHg.   Did not get PD the night of presentation.   Reports that his prescription is 10hrs, 4 cycles, 11L total, 2.2L with 2L extraneal.   He has residual function, urinates ~1L /day .  We are following for PD management.     Interval history:   Dialyzed with 4.25% dextrose last night. 2.5L UF noted.   Feels better today      PHYSICAL EXAM:    Vitals:    /67   Pulse 88   Temp (!) 96.4 °F (35.8 °C) (Temporal)   Resp 16   Ht 1.727 m (5' 8\")   Wt 81.1 kg (178 lb 12.7 oz)   SpO2 99%   BMI 27.19 kg/m²   I/O last 3 completed shifts:  In: 1694.7 [P.O.:1320; I.V.:374.7]  Out: 225 [Urine:225]  No intake/output data recorded.    Physical Exam:  Gen:  alert, oriented x 3  PERRL , EOM +  Pallor +, No icterus  JVP not raised   CV: RRR no murmur or rub .  Lungs:B/ L air entry, Normal breath sounds   Abd: soft, bowel sounds + , No organomegaly   Ext: No edema, no cyanosis  Skin: Warm.  No rash  Neuro: nonfocal.      DATA:    CBC:   Lab Results   Component Value Date/Time    WBC 4.2 02/22/2025 02:09 AM    RBC 3.75 02/22/2025 02:09 AM    HGB 11.0 02/22/2025 02:09 AM    HCT 33.7 02/22/2025 02:09 AM    MCV 89.8 02/22/2025 02:09 AM    MCH 29.3 02/22/2025 02:09 AM    MCHC 32.7 02/22/2025 02:09 AM    RDW 15.5 02/22/2025 02:09 AM     02/22/2025 02:09 AM    MPV 9.5 02/22/2025 02:09 AM     BMP:    Lab  Results   Component Value Date/Time     02/23/2025 04:48 AM    K 4.3 02/23/2025 04:48 AM    K 5.8 01/15/2025 04:27 AM    CL 93 02/23/2025 04:48 AM    CO2 26 02/23/2025 04:48 AM    BUN 50 02/23/2025 04:48 AM    CREATININE 10.3 02/23/2025 04:48 AM    CALCIUM 9.1 02/23/2025 04:48 AM    GFRAA 9 10/03/2022 04:45 AM    GFRAA 44 06/15/2013 01:00 AM    LABGLOM 5 02/23/2025 04:48 AM    LABGLOM 6 04/30/2024 04:51 AM    GLUCOSE 186 02/23/2025 04:48 AM     Magnesium:    Lab Results   Component Value Date/Time    MG 1.79 02/19/2025 06:00 AM     Phosphorus:    Lab Results   Component Value Date/Time    PHOS 6.2 02/19/2025 06:00 AM     LDH:    Lab Results   Component Value Date/Time     12/07/2021 05:18 PM     Uric Acid:    Lab Results   Component Value Date/Time    URICACID 6.4 05/01/2020 06:44 AM     PT/INR:    Lab Results   Component Value Date/Time    PROTIME 13.7 02/18/2025 07:10 PM    INR 1.03 02/18/2025 07:10 PM     U/A:    Lab Results   Component Value Date/Time    COLORU Yellow 02/17/2025 08:55 AM    PROTEINU 100 02/17/2025 08:55 AM    PHUR 6.0 02/17/2025 08:55 AM    PHUR 5.0 04/19/2024 10:30 AM    WBCUA 30 02/17/2025 08:55 AM    RBCUA 2 02/17/2025 08:55 AM    MUCUS 1+ 05/25/2010 07:19 PM    BACTERIA None Seen 02/17/2025 08:55 AM    CLARITYU Clear 02/17/2025 08:55 AM    LEUKOCYTESUR SMALL 02/17/2025 08:55 AM    UROBILINOGEN 1.0 02/17/2025 08:55 AM    BILIRUBINUR Negative 02/17/2025 08:55 AM    BLOODU Negative 02/17/2025 08:55 AM    GLUCOSEU 250 02/17/2025 08:55 AM    GLUCOSEU >=1000 05/25/2010 07:19 PM    AMORPHOUS 2+ 12/07/2021 05:08 AM     Microalbumen/Creatinine ratio:  No components found for: \"RUCREAT\"  IRON:    Lab Results   Component Value Date/Time    IRON 34 01/10/2025 08:18 AM     Iron Saturation:  No components found for: \"PERCENTFE\"  TIBC:    Lab Results   Component Value Date/Time    TIBC 241 01/10/2025 08:18 AM     FERRITIN:    Lab Results   Component Value Date/Time    FERRITIN 54.3 01/10/2025

## 2025-02-23 NOTE — PROGRESS NOTES
[facility]   Daily Progress Note      Admit Date:  2/18/2025    HPI:    Mr. Garay is a 59 year old male with history of CAD/CABG, ESRD on PD, DM, PVD status post digit amputation right foot 2/17/2025    He presented to the hospital for LHC, instent stenosis SVG to OM2.  He had a toe amputation on 2/17/2025.  He needs brachytherapy and complex PCI/ either as inpatient or outpatient basis       Subjective:  Patient is being seen for CAD. There were no acute overnight cardiac events. He is sitting up in the bed dosing off and on this morning.  No chest pain or shortness of breath.    Weight 188-177 creat 10.3    Objective:   /67   Pulse 93   Temp (!) 96.4 °F (35.8 °C) (Temporal)   Resp 16   Ht 1.727 m (5' 8\")   Wt 81.1 kg (178 lb 12.7 oz)   SpO2 99%   BMI 27.19 kg/m²     Intake/Output Summary (Last 24 hours) at 2/23/2025 0756  Last data filed at 2/23/2025 0200  Gross per 24 hour   Intake 1589.89 ml   Output 225 ml   Net 1364.89 ml          Physical Exam:  General:  Awake, alert, oriented in NAD  Skin:  Warm and dry.  No unusual bruising or rash  Neck:  Supple.  No JVD or carotid bruit appreciated  Chest:  Normal effort.  Clear to auscultation, no wheezes/rhonchi/rales  Cardiovascular:  RRR, S1/S2, no murmur/gallop/rub  Abdomen:  Soft, nontender, +bowel sounds, PD catheter  Extremities:  No edema  Neurological: No focal deficits  Psychological: Normal mood and affect      Medications:    potassium chloride  20 mEq Oral Daily with breakfast    lactulose  20 g Oral TID    gentamicin   Topical Daily    aspirin  81 mg Oral Daily    levETIRAcetam  500 mg Oral Daily    isosorbide mononitrate  30 mg Oral Nightly    vitamin D  50,000 Units Oral Weekly    carvedilol  12.5 mg Oral BID WC    sertraline  50 mg Oral Nightly    ranolazine  500 mg Oral BID    calcitRIOL  0.25 mcg Oral Daily    clopidogrel  75 mg Oral Daily    metoclopramide  10 mg Oral 4x Daily    torsemide  100 mg Oral Daily       Continue plavix 75 mg daily  Continue ranexa 500 mg twice a day   Continue crestor 40 mg nightly   Continue entresto 49-51 mg twice a day and torsemide 100 mg daily  No sglt2 or aldactone due to ESRD on PD    Per Dr Celestin note:  Have reached out to [facility] to try to arrange consultation, for brachytherapy and complex PCI/, either on an inpatient or outpatient basis pending clinical course with Dr Camara    Discussed with hospitalist  He will just need to stay over the weekend and Dr Celestin can come up with a plan for transfer vs outpatient  He is stable from cardiology standpoint  Will have interventional cards see him tomorrow for plan    NYHA III    Discussed with patient who is agreeable with plan of care.     Thank you for allowing me to participate in the care of your patient.    BRAYAN GIBBONS, APRN - CNS, CNS

## 2025-02-23 NOTE — DIALYSIS
Treatment time: 10 Hours 42 minutes  Net UF: 2556 ml  Dwell Time: 34 minutes gained    Treatment completed without complications or complaints from patient. Effluent clear/yellow and lines taped to patient per protocol. Patient resting comfortably with VSS upon exiting room.      Copy of dialysis treatment record placed in chart, to be scanned into EMR and report given to Yaquelin Dia RN.

## 2025-02-23 NOTE — PROGRESS NOTES
[facility]    2/23/2025 10:09 AM        Name: Te Garay .              Admitted: 2/18/2025  Primary Care Provider: Reid Lei MD (Tel: [phone])      Chief complaint: 60 yo male with hx CAD/CABG, presented to hospital for [facility], found to have in-stent stenosis SVG-OM2. Admitted with unstable angina.      Subjective:  Resting in bed, dozing, awakens easily. Says he is tired today. Denies any chest discomfort overnight, says breathing is good. No abdominal pain or nausea. Offers c/o operative discomfort right foot. Remains on heparin drip.     Reviewed interval ancillary notes    Current Medications  traMADol (ULTRAM) tablet 25 mg, Q6H PRN  potassium chloride (KLOR-CON M) extended release tablet 20 mEq, Daily with breakfast  lactulose (CHRONULAC) 10 GM/15ML solution 20 g, TID  gentamicin (GARAMYCIN) 0.1 % cream, Daily  HYDROmorphone HCl PF (DILAUDID) injection 1 mg, Q4H PRN  aspirin EC tablet 81 mg, Daily  levETIRAcetam (KEPPRA) tablet 500 mg, Daily  isosorbide mononitrate (IMDUR) extended release tablet 30 mg, Nightly  vitamin D (ERGOCALCIFEROL) capsule 50,000 Units, Weekly  carvedilol (COREG) tablet 12.5 mg, BID WC  sertraline (ZOLOFT) tablet 50 mg, Nightly  ranolazine (RANEXA) extended release tablet 500 mg, BID  calcitRIOL (ROCALTROL) capsule 0.25 mcg, Daily  clopidogrel (PLAVIX) tablet 75 mg, Daily  nitroGLYCERIN (NITROSTAT) SL tablet 0.4 mg, Q5 Min PRN  metoclopramide (REGLAN) tablet 10 mg, 4x Daily  torsemide (DEMADEX) tablet 100 mg, Daily  rosuvastatin (CRESTOR) tablet 40 mg, Nightly  sacubitril-valsartan (ENTRESTO) 49-51 MG per tablet 1 tablet, BID  sodium chloride flush 0.9 % injection 5-40 mL, 2 times per day  sodium chloride flush 0.9 % injection 5-40 mL, PRN  0.9 % sodium chloride infusion, PRN  acetaminophen (TYLENOL) tablet 650 mg, Q4H PRN  Insulin Pump - Basal Dose , Daily  dextrose bolus        [facility] 2/18/2025:   1. LM-40% distal  2. LAD-100% ostial  3. Cx-100% mid  4. RCA-nondominant, small caliber vessel  5. RV marginal-70% mid, 90% distal, small caliber vessel, collaterals to distal circumflex  6. LIMA-LAD: Widely patent. Post anastomotic LAD supplying collaterals to the circumflex  7. SVG-OM2: 70% proximal, in-stent, 99% mid, in-stent  8., LV gram not done due to elevated LVEDP. LVEDP 36 mmHg  9. Will need further discussion and evaluation for treatment options of restenosed stent and SVG-OM2       Problem List  Principal Problem:    Unstable angina ([facility])  Active Problems:    PAD (peripheral artery disease)    Chronic systolic heart failure (HCC)    ESRD on peritoneal dialysis ([facility])    Type 1 diabetes mellitus ([facility])    Coronary artery disease due to calcified coronary lesion    S/P CABG (coronary artery bypass graft)    Worsening angina ([facility])  Resolved Problems:    * No resolved hospital problems. *       Assessment & Plan:     Unstable angina  - Hx CABG (2021), PCI of SVG-OM2 (4/2021)  - [facility] yesterday revealed 99% in-stent stenosis SVG-OM2  - Remains on heparin drip, continue to follow anti-XA  - Cardiology has reached out to [facility] to arrange consultation for brachytherapy and complex PCI, either as inpatient or outpatient basis pending clinical course. Awaiting final recs.   - Continue asa 81 mg, Plavix 75 mg, carvedilol 12.5 mg, rosuvastatin 40 mg, Ranexa 500 mg, Entresto 49/51 mg    ESRD on PD,  Hypervolemia  - Management per nephrology, changed to 4.25% dextrose solution to facilitate ultrafiltration with PD  - Continue torsemide 100 mg    DM1  - A1C 7.7  - On insulin pump, continued on admission  - BG values controlled  - Continue to follow BG values  - Continue hypoglycemia correction protocol    PVD,  Diabetic foot ulcer  - Recent aortogram (1/2025) with angioplasty of anterior tibial artery and posterior tibial artery   - S/p fifth digit amputation right foot 2/17/2025  - Wound care

## 2025-02-23 NOTE — DIALYSIS
PD dressing changed and gentamicin cream applied. Site C/D/I\. Last BM 2/21. Treatment initiated without complications or complaints from patient. Patient resting comfortably with VSS upon dialysis RN exit from room. Yamila Guerra RN notified of start of treatment and hotline number located on top of machine for any questions about troubleshooting during after hours.

## 2025-02-24 ENCOUNTER — TELEPHONE (OUTPATIENT)
Dept: ENDOCRINOLOGY | Age: 60
End: 2025-02-24

## 2025-02-24 LAB
ANION GAP SERPL CALCULATED.3IONS-SCNC: 15 MMOL/L (ref 3–16)
ANTI-XA UNFRAC HEPARIN: 0.58 IU/ML (ref 0.3–0.7)
BUN SERPL-MCNC: 50 MG/DL (ref 7–20)
CALCIUM SERPL-MCNC: 9.5 MG/DL (ref 8.3–10.6)
CHLORIDE SERPL-SCNC: 89 MMOL/L (ref 99–110)
CO2 SERPL-SCNC: 26 MMOL/L (ref 21–32)
CREAT SERPL-MCNC: 10.3 MG/DL (ref 0.9–1.3)
DEPRECATED RDW RBC AUTO: 15.9 % (ref 12.4–15.4)
GFR SERPLBLD CREATININE-BSD FMLA CKD-EPI: 5 ML/MIN/{1.73_M2}
GLUCOSE BLD-MCNC: 128 MG/DL (ref 70–99)
GLUCOSE BLD-MCNC: 167 MG/DL (ref 70–99)
GLUCOSE BLD-MCNC: 168 MG/DL (ref 70–99)
GLUCOSE BLD-MCNC: 383 MG/DL (ref 70–99)
GLUCOSE BLD-MCNC: 415 MG/DL (ref 70–99)
GLUCOSE SERPL-MCNC: 463 MG/DL (ref 70–99)
HCT VFR BLD AUTO: 37.4 % (ref 40.5–52.5)
HGB BLD-MCNC: 12.4 G/DL (ref 13.5–17.5)
MCH RBC QN AUTO: 30 PG (ref 26–34)
MCHC RBC AUTO-ENTMCNC: 33.2 G/DL (ref 31–36)
MCV RBC AUTO: 90.4 FL (ref 80–100)
PERFORMED ON: ABNORMAL
PLATELET # BLD AUTO: 225 K/UL (ref 135–450)
PMV BLD AUTO: 10.2 FL (ref 5–10.5)
POTASSIUM SERPL-SCNC: 5.3 MMOL/L (ref 3.5–5.1)
RBC # BLD AUTO: 4.14 M/UL (ref 4.2–5.9)
SODIUM SERPL-SCNC: 130 MMOL/L (ref 136–145)
WBC # BLD AUTO: 6.7 K/UL (ref 4–11)

## 2025-02-24 PROCEDURE — 85520 HEPARIN ASSAY: CPT

## 2025-02-24 PROCEDURE — 90945 DIALYSIS ONE EVALUATION: CPT

## 2025-02-24 PROCEDURE — 36415 COLL VENOUS BLD VENIPUNCTURE: CPT

## 2025-02-24 PROCEDURE — 99233 SBSQ HOSP IP/OBS HIGH 50: CPT | Performed by: NURSE PRACTITIONER

## 2025-02-24 PROCEDURE — 2060000000 HC ICU INTERMEDIATE R&B

## 2025-02-24 PROCEDURE — 80048 BASIC METABOLIC PNL TOTAL CA: CPT

## 2025-02-24 PROCEDURE — 6370000000 HC RX 637 (ALT 250 FOR IP): Performed by: INTERNAL MEDICINE

## 2025-02-24 PROCEDURE — 6370000000 HC RX 637 (ALT 250 FOR IP): Performed by: STUDENT IN AN ORGANIZED HEALTH CARE EDUCATION/TRAINING PROGRAM

## 2025-02-24 PROCEDURE — 2500000003 HC RX 250 WO HCPCS: Performed by: INTERNAL MEDICINE

## 2025-02-24 PROCEDURE — 6360000002 HC RX W HCPCS: Performed by: INTERNAL MEDICINE

## 2025-02-24 PROCEDURE — 85027 COMPLETE CBC AUTOMATED: CPT

## 2025-02-24 PROCEDURE — 6360000002 HC RX W HCPCS: Performed by: NURSE PRACTITIONER

## 2025-02-24 RX ADMIN — Medication 10 ML: at 20:37

## 2025-02-24 RX ADMIN — HYDROMORPHONE HYDROCHLORIDE 1 MG: 1 INJECTION, SOLUTION INTRAMUSCULAR; INTRAVENOUS; SUBCUTANEOUS at 08:52

## 2025-02-24 RX ADMIN — CARVEDILOL 12.5 MG: 6.25 TABLET, FILM COATED ORAL at 08:51

## 2025-02-24 RX ADMIN — METOCLOPRAMIDE HYDROCHLORIDE 10 MG: 10 TABLET ORAL at 17:43

## 2025-02-24 RX ADMIN — TORSEMIDE 100 MG: 100 TABLET ORAL at 08:51

## 2025-02-24 RX ADMIN — CARVEDILOL 12.5 MG: 6.25 TABLET, FILM COATED ORAL at 17:43

## 2025-02-24 RX ADMIN — RANOLAZINE 500 MG: 500 TABLET, EXTENDED RELEASE ORAL at 20:35

## 2025-02-24 RX ADMIN — METOCLOPRAMIDE HYDROCHLORIDE 10 MG: 10 TABLET ORAL at 20:36

## 2025-02-24 RX ADMIN — GENTAMICIN SULFATE: 1 CREAM TOPICAL at 21:08

## 2025-02-24 RX ADMIN — Medication 10 ML: at 08:53

## 2025-02-24 RX ADMIN — ONDANSETRON 4 MG: 2 INJECTION INTRAMUSCULAR; INTRAVENOUS at 21:32

## 2025-02-24 RX ADMIN — METOCLOPRAMIDE HYDROCHLORIDE 10 MG: 10 TABLET ORAL at 14:17

## 2025-02-24 RX ADMIN — SERTRALINE HYDROCHLORIDE 50 MG: 50 TABLET ORAL at 20:36

## 2025-02-24 RX ADMIN — SODIUM ZIRCONIUM CYCLOSILICATE 10 G: 10 POWDER, FOR SUSPENSION ORAL at 08:51

## 2025-02-24 RX ADMIN — HYDROMORPHONE HYDROCHLORIDE 1 MG: 1 INJECTION, SOLUTION INTRAMUSCULAR; INTRAVENOUS; SUBCUTANEOUS at 04:09

## 2025-02-24 RX ADMIN — ONDANSETRON 4 MG: 2 INJECTION INTRAMUSCULAR; INTRAVENOUS at 03:53

## 2025-02-24 RX ADMIN — CLOPIDOGREL BISULFATE 75 MG: 75 TABLET ORAL at 08:51

## 2025-02-24 RX ADMIN — SACUBITRIL AND VALSARTAN 1 TABLET: 49; 51 TABLET, FILM COATED ORAL at 08:50

## 2025-02-24 RX ADMIN — SACUBITRIL AND VALSARTAN 1 TABLET: 49; 51 TABLET, FILM COATED ORAL at 20:36

## 2025-02-24 RX ADMIN — CALCITRIOL CAPSULES 0.25 MCG 0.25 MCG: 0.25 CAPSULE ORAL at 08:51

## 2025-02-24 RX ADMIN — ASPIRIN 81 MG: 81 TABLET, COATED ORAL at 08:51

## 2025-02-24 RX ADMIN — ROSUVASTATIN 40 MG: 20 TABLET, FILM COATED ORAL at 20:36

## 2025-02-24 RX ADMIN — ISOSORBIDE MONONITRATE 30 MG: 30 TABLET, EXTENDED RELEASE ORAL at 20:36

## 2025-02-24 RX ADMIN — RANOLAZINE 500 MG: 500 TABLET, EXTENDED RELEASE ORAL at 08:51

## 2025-02-24 RX ADMIN — METOCLOPRAMIDE HYDROCHLORIDE 10 MG: 10 TABLET ORAL at 08:51

## 2025-02-24 RX ADMIN — HEPARIN SODIUM 12 UNITS/KG/HR: 10000 INJECTION, SOLUTION INTRAVENOUS at 04:07

## 2025-02-24 RX ADMIN — LEVETIRACETAM 500 MG: 500 TABLET, FILM COATED ORAL at 08:51

## 2025-02-24 RX ADMIN — ONDANSETRON 4 MG: 2 INJECTION INTRAMUSCULAR; INTRAVENOUS at 08:51

## 2025-02-24 ASSESSMENT — PAIN DESCRIPTION - DESCRIPTORS
DESCRIPTORS: STABBING;THROBBING
DESCRIPTORS: STABBING;THROBBING

## 2025-02-24 ASSESSMENT — PAIN DESCRIPTION - ORIENTATION
ORIENTATION: RIGHT
ORIENTATION: RIGHT

## 2025-02-24 ASSESSMENT — PAIN DESCRIPTION - LOCATION
LOCATION: FOOT
LOCATION: FOOT

## 2025-02-24 ASSESSMENT — PAIN SCALES - GENERAL
PAINLEVEL_OUTOF10: 6
PAINLEVEL_OUTOF10: 0
PAINLEVEL_OUTOF10: 3
PAINLEVEL_OUTOF10: 2

## 2025-02-24 ASSESSMENT — PAIN DESCRIPTION - PAIN TYPE
TYPE: SURGICAL PAIN
TYPE: SURGICAL PAIN

## 2025-02-24 NOTE — PROGRESS NOTES
Treatment time: 10 Hours 46 minutes  Net UF: 2509 ml  Dwell Time: 50 minutes gained    Treatment completed without complications or complaints from patient. Effluent clear yellowish and lines taped to patient per protocol. Patient resting comfortably with VSS upon exiting room.      Copy of dialysis treatment record placed in chart, to be scanned into EMR and report given to Edouard Ferrera RN.

## 2025-02-24 NOTE — PROGRESS NOTES
[facility]   Nephrology progress Note  [phone]  [phone]   [place]         Reason for Consult:  ESRD    HISTORY OF PRESENT ILLNESS:      Te Garay is a 59 y.o. male with a PMHx of ESRD on PD, CAD/CABG/CHF, carotid artery stenosis, DM 2, diabetic neuropathy, hypertension, hyperlipidemia, seizure disorder.   He had fifth digit amputation of right foot on 2/17, reports receiving 3L of fluid on that day, then was admitted on 2/18 for planned elective LHC that was completed. We were consulted for PD management.   LHC showed SVG-OM2: 70% proximal, in-stent, 99% mid, in-stent. LVEDP 36mmHg.   Did not get PD the night of presentation.   Reports that his prescription is 10hrs, 4 cycles, 11L total, 2.2L with 2L extraneal.   He has residual function, urinates ~1L /day .  We are following for PD management.     Interval history:   Dialyzed with 4.25% dextrose last night. 2.5L UF noted.   No issued with PD reported.   No constipation.       PHYSICAL EXAM:    Vitals:    BP (!) 140/79   Pulse 85   Temp 97.3 °F (36.3 °C) (Temporal)   Resp 18   Ht 1.727 m (5' 8\")   Wt 83.6 kg (184 lb 4.8 oz)   SpO2 97%   BMI 28.02 kg/m²   I/O last 3 completed shifts:  In: 781.4 [P.O.:240; I.V.:541.4]  Out: 725 [Urine:725]  No intake/output data recorded.    Physical Exam:  Gen:  alert, oriented x 3  PERRL , EOM +  Pallor +, No icterus  JVP not raised   CV: RRR no murmur or rub .  Lungs:B/ L air entry, Normal breath sounds   Abd: soft, bowel sounds + , No organomegaly   Ext: No edema, no cyanosis  Skin: Warm.  No rash  Neuro: nonfocal.      DATA:    CBC:   Lab Results   Component Value Date/Time    WBC 6.7 02/24/2025 05:46 AM    RBC 4.14 02/24/2025 05:46 AM    HGB 12.4 02/24/2025 05:46 AM    HCT 37.4 02/24/2025 05:46 AM    MCV 90.4 02/24/2025 05:46 AM    MCH 30.0 02/24/2025 05:46 AM    MCHC 33.2 02/24/2025 05:46 AM    RDW 15.9 02/24/2025 05:46 AM     02/24/2025 05:46 AM    MPV 10.2 02/24/2025

## 2025-02-24 NOTE — TELEPHONE ENCOUNTER
Lvm for Alba, we do not have Tslim supplies in the office, I emailed [employer] to reach out to pt to help get him supplies

## 2025-02-24 NOTE — PROGRESS NOTES
Hospitalist Progress Note      Name:  Te Garay /Age/Sex: 1965  (59 y.o. male)   MRN & CSN:  [id number] & [id number] Encounter Date/Time: 2025 11:19 AM EST   Location:  [id number] PCP: Reid Lei MD     Attending:Phuc Mccormack MD       Hospital Day: 7    Subjective:   Chief Complaint: \"chest pain\"  Te Garay is a 59 y.o. male with a past medical history of hypertension, hyperlipidemia, HFrEF, KATIE, PVD, CAD/CABG who presented to hospital for [facility], found to have in-stent stenosis SVG-OM2. Admitted with unstable angina. S/p [facility]  revealed 99% in-stent stenosis SVG-OM2.  He was placed on hepatin gtt and treated medically for CP.  He needs OP referral to [facility] for PCI and cardiology has been arranging prior to discharge vs planning for transfer.   cardiology cleared patient for discharge and planning for OP PCI.        Interval History:  Today, he is resting in bed.  He had chest pain overnight.  He remains on heparin gtt.  He denies SOB.  He is going to run out of his insulin pump supplies as his shipment was delayed.      Independently reviewed interval ancillary notes from cardiology.     Assessment and Recommendations   Problem List  Principal Problem:    Unstable angina (HCC)  Active Problems:    PAD (peripheral artery disease)    Chronic systolic heart failure (HCC)    ESRD on peritoneal dialysis (HCC)    Type 1 diabetes mellitus (HCC)    Coronary artery disease due to calcified coronary lesion    S/P CABG (coronary artery bypass graft)    Worsening angina (HCC)  Resolved Problems:    * No resolved hospital problems. *     Assessment and Plan:    Unstable angina  - Hx CABG (), PCI of SVG-OM2 (2021)  - [facility]  revealed 99% in-stent stenosis SVG-OM2  - Remains on heparin drip, continue to follow anti-XA  - Cardiology has reached out to [facility] to arrange consultation for brachytherapy and complex PCI, either as inpatient or outpatient basis pending clinical course. Awaiting

## 2025-02-24 NOTE — PROGRESS NOTES
CCPD Order   Exchanges: 4   Exchange Volume: 2500 ml   Total Time: 10 hrs   Dextrose: 4.25%   Last Fill: 0 ml   Total Volume: 54352 ml     Orders verified. Supplies taken to pt's room. Report received. Cycler set up, primed and pre tested. [facility]. Pt connected to cycler. CCPD initiated without problem. Initial effluent clear.       If problems should arise please call the [phone] number on top of PD cycler machine.

## 2025-02-24 NOTE — PROGRESS NOTES
Notified by pt's primary RN that pt is out of insulin pump site change supplies. Doesn't have any at home at this time d/t a late shipment of supplies.    Message left with outpatient endocrinology office (Dr. Castillo) to see if they have supplies available for pt. Left my phone number for call back.    Electronically signed by Alba Osborne RN on 2/24/2025 at 12:52 PM

## 2025-02-24 NOTE — PROGRESS NOTES
[facility]   Cardiology Progress Note     Date: 2/24/2025  Admit Date: 2/18/2025     Reason for consultation:     No chief complaint on file.    History of Present Illness: History obtained from patient and medical record.     Te Garay is a 59 y.o. male admitted after cardiac catheterization. He had just had a procedure 2/17/25 for toe amputation and was given 3 liters of fluid, he came in the following day for an outpatient cath for ongoing chest discomfort. After the procedure, he was feeling fluid overloaded and in a lot of pain. Nephrology is on board to assist with PD. (per consult note)    Interval Hx: Today, he is feeling well this AM. Reported experiencing some chest pain overnight that resolved when \"fluid came off\" and blood sugar corrected. He states this is not his angina. He can tell it's fluid and glucose related. Tele stable.     Patient seen and examined. Clinical notes reviewed. Telemetry reviewed.  No new complaints today. No major events overnight.   Denies having chest pain, palpitations, shortness of breath, orthopnea/PND, cough, or dizziness at the time of this visit.      Past Medical History:  Past Medical History:   Diagnosis Date    Cardiomyopathy ([facility])     Carotid artery stenosis 10/25/2016    CHF (congestive heart failure) ([facility])     Coronary artery disease     De Quervain thyroiditis 06/08/2023    ESRD (end stage renal disease) ([facility])     PD nightly x 10 hours    HFrEF (heart failure with reduced ejection fraction) ([facility])     Hyperlipidemia 05/26/2010    Hyperparathyroidism (secondary) 04/25/2024    Hypertension 01/12/2019    KATIE (obstructive sleep apnea)     Overweight     Peripheral neuropathy     Poorly controlled type 1 diabetes mellitus ([facility])     PVD (peripheral vascular disease)     Seizure disorder ([facility])     TIA (transient ischemic attack) 2024        Past Surgical History:    has a past surgical history that includes Coronary artery bypass graft; Cardiac  catheterization; hernia repair; Tonsillectomy; LAPAROSCOPY INSERTION PERITONEAL CATHETER (N/A, 06/03/2020); Carotid stent placement (Right); Bladder surgery (Left, 08/24/2023); Foot Debridement (Left, 10/16/2023); Foot Debridement (Left, 10/23/2023); Cardiac procedure (N/A, 06/27/2024); Cardiac procedure (N/A, 07/31/2024); Cardiac procedure (N/A, 07/31/2024); Cardiac procedure (N/A, 07/31/2024); Foot surgery (Left, 11/25/2024); Ankle surgery (Left, 11/25/2024); invasive vascular (Right, 01/14/2025); invasive vascular (N/A, 01/14/2025); invasive vascular (N/A, 01/14/2025); Colonoscopy; vascular surgery; Foot surgery (Right, 2/17/2025); and Cardiac procedure (N/A, 2/18/2025).     Social History:  Reviewed.  reports that he has never smoked. He has never used smokeless tobacco. He reports that he does not currently use alcohol. He reports that he does not use drugs.     Allergies:  Allergies   Allergen Reactions    Iodides Anaphylaxis    Shellfish-Derived Products Anaphylaxis    Morphine Hives and Itching    Lipitor [Atorvastatin] Rash     Rash on head and neck       Family History:  Reviewed. family history includes Arthritis in his brother; Coronary Art Dis in his father and paternal grandmother; Diabetes in his father and paternal grandmother; Heart Murmur in his sister; Hypertension in his mother; Mult Sclerosis in his brother; Seizures in his mother; Sleep Apnea in his brother; Thyroid Disease in his sister. Denies family history of sudden cardiac death, arrhythmia, premature CAD    Home Meds:  Prior to Visit Medications    Medication Sig Taking? Authorizing Provider   sacubitril-valsartan (ENTRESTO) 49-51 MG per tablet Take 1 tablet by mouth 2 times daily Yes Jaleel Koch MD   predniSONE (DELTASONE) 50 MG tablet Take one tablet 13 ; 7 ; & 1 hour prior to angiogram Yes Nathaniel Celestin MD   FIASP 100 UNIT/ML SOLN USE 30-50 UNITS DAILY VIA PUMP Yes Yves Castillo MD   torsemide (DEMADEX) 100 MG tablet

## 2025-02-24 NOTE — PLAN OF CARE
Problem: Chronic Conditions and Co-morbidities  Goal: Patient's chronic conditions and co-morbidity symptoms are monitored and maintained or improved  2/24/2025 1126 by Edouard Ferrera RN  Outcome: Progressing  2/24/2025 0007 by Yamila Guerra RN  Outcome: Progressing     Problem: Discharge Planning  Goal: Discharge to home or other facility with appropriate resources  Outcome: Progressing     Problem: Pain  Goal: Verbalizes/displays adequate comfort level or baseline comfort level  2/24/2025 1126 by Edouard Ferrera RN  Outcome: Progressing  2/24/2025 0007 by Yamila Guerra RN  Outcome: Progressing     Problem: Safety - Adult  Goal: Free from fall injury  2/24/2025 1126 by Edouard Ferrera RN  Outcome: Progressing  2/24/2025 0007 by Yamila Guerra RN  Outcome: Progressing     Problem: ABCDS Injury Assessment  Goal: Absence of physical injury  2/24/2025 1126 by Edouard Ferrera RN  Outcome: Progressing  2/24/2025 0007 by Yamila Guerra RN  Outcome: Progressing     Problem: Cardiovascular - Adult  Goal: Maintains optimal cardiac output and hemodynamic stability  Outcome: Progressing  Goal: Absence of cardiac dysrhythmias or at baseline  Outcome: Progressing     Problem: Skin/Tissue Integrity - Adult  Goal: Skin integrity remains intact  Outcome: Progressing  Goal: Incisions, wounds, or drain sites healing without S/S of infection  Outcome: Progressing  Goal: Oral mucous membranes remain intact  Outcome: Progressing     Problem: Musculoskeletal - Adult  Goal: Return mobility to safest level of function  Outcome: Progressing  Goal: Maintain proper alignment of affected body part  Outcome: Progressing  Goal: Return ADL status to a safe level of function  Outcome: Progressing     Problem: Infection - Adult  Goal: Absence of infection at discharge  Outcome: Progressing  Goal: Absence of infection during hospitalization  Outcome: Progressing     Problem: Metabolic/Fluid and Electrolytes -

## 2025-02-24 NOTE — TELEPHONE ENCOUNTER
Call from [place] diabetes Educ / [facility]  Asking if we have any left over supplies for pt he needs site change supplies for his T-Slim X2 pump    CB# caller was Alba (diabetes Educ / [facility]) [phone]

## 2025-02-25 LAB
ANION GAP SERPL CALCULATED.3IONS-SCNC: 12 MMOL/L (ref 3–16)
ANTI-XA UNFRAC HEPARIN: <0.1 IU/ML (ref 0.3–0.7)
BUN SERPL-MCNC: 48 MG/DL (ref 7–20)
CALCIUM SERPL-MCNC: 9.7 MG/DL (ref 8.3–10.6)
CHLORIDE SERPL-SCNC: 92 MMOL/L (ref 99–110)
CO2 SERPL-SCNC: 30 MMOL/L (ref 21–32)
CREAT SERPL-MCNC: 10.3 MG/DL (ref 0.9–1.3)
EKG ATRIAL RATE: 85 BPM
EKG DIAGNOSIS: NORMAL
EKG P AXIS: 43 DEGREES
EKG P-R INTERVAL: 150 MS
EKG Q-T INTERVAL: 396 MS
EKG QRS DURATION: 122 MS
EKG QTC CALCULATION (BAZETT): 471 MS
EKG R AXIS: 16 DEGREES
EKG T AXIS: 117 DEGREES
EKG VENTRICULAR RATE: 85 BPM
GFR SERPLBLD CREATININE-BSD FMLA CKD-EPI: 5 ML/MIN/{1.73_M2}
GLUCOSE BLD-MCNC: 116 MG/DL (ref 70–99)
GLUCOSE BLD-MCNC: 127 MG/DL (ref 70–99)
GLUCOSE BLD-MCNC: 187 MG/DL (ref 70–99)
GLUCOSE BLD-MCNC: 209 MG/DL (ref 70–99)
GLUCOSE BLD-MCNC: 241 MG/DL (ref 70–99)
GLUCOSE BLD-MCNC: 261 MG/DL (ref 70–99)
GLUCOSE BLD-MCNC: 55 MG/DL (ref 70–99)
GLUCOSE BLD-MCNC: 58 MG/DL (ref 70–99)
GLUCOSE BLD-MCNC: 61 MG/DL (ref 70–99)
GLUCOSE BLD-MCNC: 71 MG/DL (ref 70–99)
GLUCOSE BLD-MCNC: 94 MG/DL (ref 70–99)
GLUCOSE SERPL-MCNC: 214 MG/DL (ref 70–99)
PERFORMED ON: ABNORMAL
PERFORMED ON: NORMAL
PERFORMED ON: NORMAL
POTASSIUM SERPL-SCNC: 4.6 MMOL/L (ref 3.5–5.1)
SODIUM SERPL-SCNC: 134 MMOL/L (ref 136–145)

## 2025-02-25 PROCEDURE — 6360000002 HC RX W HCPCS: Performed by: INTERNAL MEDICINE

## 2025-02-25 PROCEDURE — 2500000003 HC RX 250 WO HCPCS: Performed by: INTERNAL MEDICINE

## 2025-02-25 PROCEDURE — 80048 BASIC METABOLIC PNL TOTAL CA: CPT

## 2025-02-25 PROCEDURE — 2060000000 HC ICU INTERMEDIATE R&B

## 2025-02-25 PROCEDURE — 90935 HEMODIALYSIS ONE EVALUATION: CPT

## 2025-02-25 PROCEDURE — 6360000002 HC RX W HCPCS: Performed by: NURSE PRACTITIONER

## 2025-02-25 PROCEDURE — 36415 COLL VENOUS BLD VENIPUNCTURE: CPT

## 2025-02-25 PROCEDURE — 6370000000 HC RX 637 (ALT 250 FOR IP): Performed by: INTERNAL MEDICINE

## 2025-02-25 PROCEDURE — 99233 SBSQ HOSP IP/OBS HIGH 50: CPT | Performed by: NURSE PRACTITIONER

## 2025-02-25 PROCEDURE — 93005 ELECTROCARDIOGRAM TRACING: CPT | Performed by: INTERNAL MEDICINE

## 2025-02-25 PROCEDURE — 5A1D70Z PERFORMANCE OF URINARY FILTRATION, INTERMITTENT, LESS THAN 6 HOURS PER DAY: ICD-10-PCS | Performed by: STUDENT IN AN ORGANIZED HEALTH CARE EDUCATION/TRAINING PROGRAM

## 2025-02-25 PROCEDURE — 85520 HEPARIN ASSAY: CPT

## 2025-02-25 RX ORDER — CARVEDILOL 12.5 MG/1
12.5 TABLET ORAL 2 TIMES DAILY WITH MEALS
Qty: 30 TABLET | Refills: 0 | Status: SHIPPED
Start: 2025-02-25

## 2025-02-25 RX ORDER — PROCHLORPERAZINE EDISYLATE 5 MG/ML
10 INJECTION INTRAMUSCULAR; INTRAVENOUS EVERY 6 HOURS PRN
Status: DISCONTINUED | OUTPATIENT
Start: 2025-02-25 | End: 2025-02-28 | Stop reason: HOSPADM

## 2025-02-25 RX ADMIN — ROSUVASTATIN 40 MG: 20 TABLET, FILM COATED ORAL at 20:20

## 2025-02-25 RX ADMIN — HYDROMORPHONE HYDROCHLORIDE 1 MG: 1 INJECTION, SOLUTION INTRAMUSCULAR; INTRAVENOUS; SUBCUTANEOUS at 23:13

## 2025-02-25 RX ADMIN — SERTRALINE HYDROCHLORIDE 50 MG: 50 TABLET ORAL at 20:20

## 2025-02-25 RX ADMIN — SACUBITRIL AND VALSARTAN 1 TABLET: 49; 51 TABLET, FILM COATED ORAL at 08:55

## 2025-02-25 RX ADMIN — METOCLOPRAMIDE HYDROCHLORIDE 10 MG: 10 TABLET ORAL at 16:39

## 2025-02-25 RX ADMIN — RANOLAZINE 500 MG: 500 TABLET, EXTENDED RELEASE ORAL at 08:55

## 2025-02-25 RX ADMIN — METOCLOPRAMIDE HYDROCHLORIDE 10 MG: 10 TABLET ORAL at 20:20

## 2025-02-25 RX ADMIN — Medication 10 ML: at 08:56

## 2025-02-25 RX ADMIN — ASPIRIN 81 MG: 81 TABLET, COATED ORAL at 08:55

## 2025-02-25 RX ADMIN — HYDROMORPHONE HYDROCHLORIDE 1 MG: 1 INJECTION, SOLUTION INTRAMUSCULAR; INTRAVENOUS; SUBCUTANEOUS at 19:14

## 2025-02-25 RX ADMIN — ISOSORBIDE MONONITRATE 30 MG: 30 TABLET, EXTENDED RELEASE ORAL at 20:20

## 2025-02-25 RX ADMIN — TORSEMIDE 100 MG: 100 TABLET ORAL at 08:56

## 2025-02-25 RX ADMIN — CARVEDILOL 12.5 MG: 6.25 TABLET, FILM COATED ORAL at 08:54

## 2025-02-25 RX ADMIN — PROCHLORPERAZINE EDISYLATE 10 MG: 5 INJECTION INTRAMUSCULAR; INTRAVENOUS at 02:02

## 2025-02-25 RX ADMIN — CARVEDILOL 12.5 MG: 6.25 TABLET, FILM COATED ORAL at 16:39

## 2025-02-25 RX ADMIN — METOCLOPRAMIDE HYDROCHLORIDE 10 MG: 10 TABLET ORAL at 08:56

## 2025-02-25 RX ADMIN — LEVETIRACETAM 500 MG: 500 TABLET, FILM COATED ORAL at 08:56

## 2025-02-25 RX ADMIN — RANOLAZINE 500 MG: 500 TABLET, EXTENDED RELEASE ORAL at 20:20

## 2025-02-25 RX ADMIN — SACUBITRIL AND VALSARTAN 1 TABLET: 49; 51 TABLET, FILM COATED ORAL at 20:20

## 2025-02-25 RX ADMIN — CALCITRIOL CAPSULES 0.25 MCG 0.25 MCG: 0.25 CAPSULE ORAL at 08:56

## 2025-02-25 RX ADMIN — CLOPIDOGREL BISULFATE 75 MG: 75 TABLET ORAL at 08:56

## 2025-02-25 RX ADMIN — HYDROMORPHONE HYDROCHLORIDE 1 MG: 1 INJECTION, SOLUTION INTRAMUSCULAR; INTRAVENOUS; SUBCUTANEOUS at 01:58

## 2025-02-25 RX ADMIN — METOCLOPRAMIDE HYDROCHLORIDE 10 MG: 10 TABLET ORAL at 12:39

## 2025-02-25 RX ADMIN — Medication 10 ML: at 20:21

## 2025-02-25 ASSESSMENT — PAIN DESCRIPTION - LOCATION
LOCATION: CHEST
LOCATION: CHEST
LOCATION: FOOT;CHEST

## 2025-02-25 ASSESSMENT — PAIN SCALES - GENERAL
PAINLEVEL_OUTOF10: 0
PAINLEVEL_OUTOF10: 7
PAINLEVEL_OUTOF10: 0
PAINLEVEL_OUTOF10: 8
PAINLEVEL_OUTOF10: 6

## 2025-02-25 ASSESSMENT — PAIN DESCRIPTION - ORIENTATION: ORIENTATION: RIGHT

## 2025-02-25 NOTE — CARE COORDINATION
Case Management -  Discharge Note      Patient Name: Te Garay                   YOB: 1965  Room: W6S-5836/5911-            Readmission Risk (Low < 19, Mod (19-27), High > 27): Readmission Risk Score: 33.7    Current PCP: Reid Lei MD      (IMM) Important Message from Medicare:    Has pt received appropriate compliance notices before being discharged if required: yes  Compliance doc:  [x] 2nd IMM; [] Code 44 [] [place]odd  Date Given: 2/25/25 Given By: SMOOTH    PT AM-PAC:   /24  OT AM-PAC:   /24    Patient/patient representative has been educated on the benefits of HHC as well as the possible risks of declining recommended services. Patient/patient representative has acknowledged the information provided and decided on the following discharge plan. Patient/ patient representative has been provided freedom of choice regarding service provider, supported by basic dialogue that supports the patient's individualized plan of care/goals.    Home Care Information:   Is patient resuming current home health care services: No    Home Care Agency:   [address],   [place] [address]  Phone: [phone]  Fax: [phone]             Services: Skilled Nursing, PT & OT    Home health agency notified of discharge.  Yes- Cindy in intake.     [facility] agency notified of discharge:  [x] Yes [] No  [] NA    Family notified of discharge:  [x] Yes  [] No  [] NA    Facility notified of discharge:  [] Yes  [] No  [x] NA    Pt is being discharged with Outpt IV Antibiotics  [] Yes [x] No  [] NA  If yes, make sure CLOVER is faxed to C agency, and meds are called in to pharmacy by RN from CLOVER orders only.      Home Peritoneal Dialysis   with    [facility]  [address] , [place] [address]  Phone:  [phone]  Fax: [phone]    Financial    Payor: MEDICARE / Plan: MEDICARE PART A AND B / Product Type: *No Product type* /     Pharmacy:  Potential assistance

## 2025-02-25 NOTE — PROGRESS NOTES
Pt to bathroom, pt stated when walking out of bathroom he felt \"excruciating chest pain\" rates it a 9/10. EKG obtained and placed into the chart. Hospitalist notified, discharge order cancelled, pt to be NPO at midnight for cards evaluation in the morning. Pt now resting in bed, states CP has improved but is afraid to get out of bed in fear of chest pain returning.

## 2025-02-25 NOTE — PROGRESS NOTES
[facility]   Nephrology progress Note  [phone]   [employer]         Reason for Consult:  ESRD    HISTORY OF PRESENT ILLNESS:      Te Garay is a 59 y.o. male with a PMHx of ESRD on PD, CAD/CABG/CHF, carotid artery stenosis, DM 2, diabetic neuropathy, hypertension, hyperlipidemia, seizure disorder.   He had fifth digit amputation of right foot on 2/17, reports receiving 3L of fluid on that day, then was admitted on 2/18 for planned elective LHC that was completed. We were consulted for PD management.   LHC showed SVG-OM2: 70% proximal, in-stent, 99% mid, in-stent. LVEDP 36mmHg.   Did not get PD the night of presentation.   Reports that his prescription is 10hrs, 4 cycles, 11L total, 2.2L with 2L extraneal.   He has residual function, urinates ~1L /day .  We are following for PD management.     Interval history:   Dialyzed with 2.5% dextrose last night. ~1L UF noted.   No issued with PD reported.       PHYSICAL EXAM:    Vitals:    /76   Pulse 87   Temp 98.2 °F (36.8 °C) (Oral)   Resp 20   Ht 1.727 m (5' 8\")   Wt 82.1 kg (181 lb)   SpO2 98%   BMI 27.52 kg/m²   I/O last 3 completed shifts:  In: 271.5 [I.V.:271.5]  Out: 500 [Urine:500]  I/O this shift:  In: 250 [P.O.:240; I.V.:10]  Out: 525 [Urine:525]    Physical Exam:  Gen:  alert, oriented x 3  PERRL , EOM +  Pallor +, No icterus  JVP not raised   CV: RRR no murmur or rub .  Lungs:B/ L air entry, Normal breath sounds   Abd: soft, bowel sounds + , No organomegaly   Ext: No edema, no cyanosis  Skin: Warm.  No rash  Neuro: nonfocal.      DATA:    CBC:   Lab Results   Component Value Date/Time    WBC 6.7 02/24/2025 05:46 AM    RBC 4.14 02/24/2025 05:46 AM    HGB 12.4 02/24/2025 05:46 AM    HCT 37.4 02/24/2025 05:46 AM    MCV 90.4 02/24/2025 05:46 AM    MCH 30.0 02/24/2025 05:46 AM    MCHC 33.2 02/24/2025 05:46 AM    RDW 15.9 02/24/2025 05:46 AM     02/24/2025 05:46 AM    MPV 10.2 02/24/2025 05:46 AM  54.3 01/10/2025 08:18 AM         IMPRESSION/RECOMMENDATIONS:      ESRD on CCPD   - CCPD -  [facility], Primary nephrologist Dr. Turner  - Continue with current prescription without a daytime exchange: 10 hours, 10L , 4 exchanges , 2.5L   - Using 2.5% Dianeal solutions. Total UF ~1L   - continue with Torsemide, continues to have a fair residual kidney function   - Avoid nephrotoxins as best possible   - outpatient clearance test below goal. Dr. Turner discussed switching to hemo, preferably home hemo. Patient is willing to consider this but wants to think about this. Will follow up in clinic with Dr Turner in 2 weeks     Hypokalemia   K in good range today      Hyponatremia   Likely related to volume overload.   Modulate  UF with PD.      Fluid overload  Improved with using 4.25% Dianeal solutions.   Currently using 2.5% Dianeal solutions.        Unstable angina   S/p C 2/18 showed SVG-OM2: 70% proximal, in-stent, 99% mid, in-stent.   Cardiology team following , discussing options for revascularization of SVG-OM 2   Noted possible discharge with outpatient follow up with [facility] Dr. Camara.      HTN  Continue with  current regimen      Anemia of chronic Kidney disease  Target Hb 9-11   Hb at goal, No need of ESAs for now as in-pt     CKD - MBD  Monitor Calcium and Phosphorus   Continue calcitriol  Low phosphorus diet      Type 1 DM  Continue insulin pump     PVD   S/p fifth digit  amputation of right foot on 2/17    Stable from renal standpoint     Thank you for allowing me to participate in the care of this patient.  I will continue to follow along.  Please contact via [employer] if any questions or concerns.     Jordyn Gonzalez MD  2/25/2025

## 2025-02-25 NOTE — PROGRESS NOTES
CCPD Order              Exchanges: 4              Exchange Volume: 2500 ml              Total Time: 10 hrs              Dextrose: 2.5%              Last Fill: 0 ml              Total Volume: 66425 ml     Orders verified. Supplies taken to pt's room. Report received. Cycler set up, primed and pre tested. [facility]"" Cath site. Pt connected to cycler. CCPD initiated without problem. Initial effluent clear.         If problems should arise please call the [phone] on top of PD cycler machine.

## 2025-02-25 NOTE — DISCHARGE SUMMARY
[facility]    [facility] Demographics    Patient. Te Garay  Date of Birth. 1965  MRN. [id number]     Primary care provider. Reid Lei MD  (Tel: [phone])    Admit date: 2/18/2025    Discharge date (blank if same as Note Date):   Note Date: 2/28/2025     Reason for Hospitalization.   \"Chest pain\"      Significant Findings.   Principal Problem:    Unstable angina (HCC)  Active Problems:    PAD (peripheral artery disease)    Chronic systolic heart failure (HCC)    ESRD on peritoneal dialysis (HCC)    Type 1 diabetes mellitus (HCC)    Coronary artery disease due to calcified coronary lesion    S/P CABG (coronary artery bypass graft)    Worsening angina (HCC)  Resolved Problems:    * No resolved hospital problems. *     Problem-based Hospital Course.  Te Garay is a 59 y.o. male with a past medical history of hypertension, hyperlipidemia, HFrEF, KATIE, PVD, CAD/CABG who presented to hospital for C, found to have in-stent stenosis SVG-OM2. Admitted with unstable angina. S/p [facility] 2/18 revealed 99% in-stent stenosis SVG-OM2.  He was placed on hepatin gtt and treated medically for CP.  He needs OP referral to [facility] for PCI and cardiology has been arranging prior to discharge vs planning for transfer.  2/24 cardiology cleared patient for discharge and planning for OP PCI.  Continues to manage his insulin pump appropriately and will continue at discharge.  He had persistent anginal CP and underwent PCI of PCI 2/27/25 s/p PCI of SVG-OM with MARIIA  2/27/2025 with Dr. Deleon.  He has not had any further CP.  No SOB and right foot is stable.  Cardiology OK with discharge, follow up on 3/3 at [facility]. Imdur was increased.  Amlodipine added and BP did not tolerate, it was stopped.     Assessment and Plan:  CAD  Chest Pain  - Hx CABG (2021), PCI of SVG-OM2 (4/2021)  - [facility] 2/18 revealed 99% in-stent stenosis SVG-OM2  - Remains on heparin drip, continue to follow anti-XA  -

## 2025-02-25 NOTE — PLAN OF CARE
Problem: Chronic Conditions and Co-morbidities  Goal: Patient's chronic conditions and co-morbidity symptoms are monitored and maintained or improved  Outcome: Progressing     Problem: Discharge Planning  Goal: Discharge to home or other facility with appropriate resources  Outcome: Progressing     Problem: Pain  Goal: Verbalizes/displays adequate comfort level or baseline comfort level  Outcome: Progressing     Problem: Safety - Adult  Goal: Free from fall injury  Outcome: Progressing     Problem: ABCDS Injury Assessment  Goal: Absence of physical injury  Outcome: Progressing     Problem: Cardiovascular - Adult  Goal: Maintains optimal cardiac output and hemodynamic stability  Outcome: Progressing  Goal: Absence of cardiac dysrhythmias or at baseline  Outcome: Progressing     Problem: Skin/Tissue Integrity - Adult  Goal: Skin integrity remains intact  Outcome: Progressing  Goal: Incisions, wounds, or drain sites healing without S/S of infection  Outcome: Progressing  Goal: Oral mucous membranes remain intact  Outcome: Progressing     Problem: Musculoskeletal - Adult  Goal: Return mobility to safest level of function  Outcome: Progressing  Goal: Maintain proper alignment of affected body part  Outcome: Progressing  Goal: Return ADL status to a safe level of function  Outcome: Progressing     Problem: Infection - Adult  Goal: Absence of infection at discharge  Outcome: Progressing  Goal: Absence of infection during hospitalization  Outcome: Progressing     Problem: Metabolic/Fluid and Electrolytes - Adult  Goal: Electrolytes maintained within normal limits  Outcome: Progressing  Goal: Hemodynamic stability and optimal renal function maintained  Outcome: Progressing  Goal: Glucose maintained within prescribed range  Outcome: Progressing     Problem: Hematologic - Adult  Goal: Maintains hematologic stability  Outcome: Progressing     Problem: Neurosensory - Adult  Goal: Achieves stable or improved neurological

## 2025-02-25 NOTE — PROGRESS NOTES
Treatment time: 11 Hours 51 minutes  Net UF: 1057 ml  Dwell Time: 46 minutes gained  Treatment completed without complications or complaints from patient. Effluent clear yellow and lines taped to patient per protocol. Patient resting comfortably with VSS upon exiting room.      Copy of dialysis treatment record placed in chart, to be scanned into EMR and report given to Edouard Ferrera RN.

## 2025-02-25 NOTE — PROGRESS NOTES
RN to pts room, pts continuous glucose monitor was alarming. Continuous glucose monitor read blood sugar of 55. Pt noted to be sweating and lethargic. This RN checked pts blood sugar with hospital glucometer, BG 58. Pt drank 3 orange juices and 1 pack of juan crackers. RN to reassess blood sugar in 15 minutes. Hospitalist notified.

## 2025-02-25 NOTE — PROGRESS NOTES
Component Value Date/Time    CKTOTAL 71 11/22/2023 05:26 AM    CKMB 1.92 05/26/2010 07:47 PM    CKMBINDEX 1.8 05/26/2010 07:47 PM    TROPONINI 0.10 10/01/2022 02:20 PM    TROPONINI 0.05 10/01/2022 03:56 AM    TROPONINI 0.06 05/20/2022 05:18 PM     Coags:   Lab Results   Component Value Date/Time    PROTIME 13.7 02/18/2025 07:10 PM    INR 1.03 02/18/2025 07:10 PM       ECHO:  11/29/24    Reduced left ventricular systolic function with a biplane LVEF of 40%.   Left ventricle is dilated. Normal wall thickness. Regional wall motion   abnormalities present. The distal inferior wall appears focally moderately   hypokinetic. Grade I diastolic dysfunction.     Mild aortic valvular regurgitation. No aortic valve stenosis.     Mild mitral valvular regurgitation. No mitral valve stenosis.     Left atrium is mildly dilated.     Right ventricle size is normal. Normal right ventricular systolic   function.      Limited echo 1/2025    Left Ventricle: Reduced left ventricular systolic function with a visually estimated EF of 30 - 35%. EF by 2D Simpsons Biplane is 37%. Left ventricle is dilated. Normal wall thickness. There are regional wall motion abnormalities.    Mitral Valve: Mild regurgitation.    Pericardium: Trivial pericardial effusion present. No indication of cardiac tamponade.    Image quality is technically difficult. Contrast used: Lumason. Technically difficult study with poor endocardial visualization.    Stress Test: 11/29/24    Abnormal rest-only SPECT myocardial perfusion images with a large ,   severe intensity  mid inferior, inferior lateral and basal inferior   lateral wall defects.     Stress Combined Conclusion   Abnormal rest-only SPECT myocardial perfusion images.      Cardiac Cath:12/2/2024    Mid LM to Dist LM lesion is 80% stenosed.     Ost Cx to Mid Cx lesion is 100% stenosed.     Ost LAD to Prox LAD lesion is 100% stenosed.     Mid Cx to Dist Cx lesion is 100% stenosed.     Origin to Mid Graft lesion  transfer vs OP follow up. Pt states he is feeling better and likes the idea of following up as OP with Dr. Camara's office. OP appointment has been arranged for 3/3. Information added to pt's AVS.      Ischemic cardiomyopathy/ CHF   ~ Echo 1/2025 EF 30-35%  ~ entresto 49/51, coreg 12.5mg BID, torsemide 100 daily  ~ Evaluating for revascularization of SVG-OM 2 versus possible  revascularization of circumflex  -Refer to [facility] Dr. Camara     PAD/ gangrenous toe   ~ s/p right foot surgery 2/17     ESRD  ~ on PD  ~ fluid management with PD  ~ per nephrology      Carotid artery stenosis- s/p Rt carotid stent 10/2016; s/p Rt carotid POBA to ISR 5/2022  HTN- stable  HLD- on statin   T1DM- A1C 8.0  Hx TIA     Ok to discharge when medically stable.   Case has been discussed with Dr. Celestin.    Multiple medical conditions with risk of decompensation.   All pertinent information and plan of care discussed with the physician.  All questions and concerns were addressed to the patient. Alternatives to my treatment were discussed. I have discussed the above stated plan with patient and spouse and the nurse. The patient and spouse verbalized understanding and agreed with the plan.    Thank you for allowing to us to participate in the care of Te ZENA Garay.    Total visit time > 52 minutes; > 50% spend counseling / coordinating care. I reviewed interval history, physical exam, review of data including labs, imaging, development and implementation of treatment plan and coordination of complex care. Counseled on risk factor modifications.     Shania [facility]   Office: [phone]    NOTE:  This report was transcribed using voice recognition software.  Every effort was made to ensure accuracy; however, inadvertent computerized transcription errors may be present.

## 2025-02-25 NOTE — PROGRESS NOTES
[facility]   Cardiology Hospital Progress Note     Date: 2/25/2025  Admit Date: 2/18/2025     Reason for consultation:     No chief complaint on file.      History of Present Illness: History obtained from patient and medical record.     Te Garay is a 59 y.o. male admitted after cardiac catheterization. He had just had a procedure 2/17/25 for toe amputation and was given 3 liters of fluid, he came in the following day for an outpatient cath for ongoing chest discomfort. After the procedure, he was feeling fluid overloaded and in a lot of pain. Nephrology is on board to assist with PD. (per consult note)     Interval Hx: Today, he is resting in bed.  Currently pain-free.  Apparently yesterday evening he walked to the bathroom and returned and had to sit on the couch.  He had severe chest pain.  Took over 30 minutes to recover with rest.  Happened again when he woke up in the middle of night with chest pain.  Concerned about his ability to go home if chest pain reoccurs just with ambulation to the bathroom going from room to room at home.  Discussed with Dr. Celestin.  [facility] to transfer to [facility] for brachytherapy and possible  PCI due to continued chest pain.    Patient seen and examined. Clinical notes reviewed. Telemetry reviewed.  No new complaints today. No major events overnight.   Denies having chest pain, palpitations, shortness of breath, orthopnea/PND, cough, or dizziness at the time of this visit.      Past Medical History:  Past Medical History:   Diagnosis Date    Cardiomyopathy (HCC)     Carotid artery stenosis 10/25/2016    CHF (congestive heart failure) ([facility])     Coronary artery disease     De Quervain thyroiditis 06/08/2023    ESRD (end stage renal disease) ([facility])     PD nightly x 10 hours    HFrEF (heart failure with reduced ejection fraction) ([facility])     Hyperlipidemia 05/26/2010    Hyperparathyroidism (secondary) 04/25/2024    Hypertension 01/12/2019    KATIE (obstructive sleep apnea)   09:40 AM    AST 15 02/17/2025 09:40 AM    ALKPHOS 115 02/17/2025 09:40 AM    AGRATIO 1.2 02/17/2025 09:40 AM    BILITOT 0.3 02/17/2025 09:40 AM     Cardiac Enzymes:   Lab Results   Component Value Date/Time    CKTOTAL 71 11/22/2023 05:26 AM    CKMB 1.92 05/26/2010 07:47 PM    CKMBINDEX 1.8 05/26/2010 07:47 PM    TROPONINI 0.10 10/01/2022 02:20 PM    TROPONINI 0.05 10/01/2022 03:56 AM    TROPONINI 0.06 05/20/2022 05:18 PM     Coags:   Lab Results   Component Value Date/Time    PROTIME 13.7 02/18/2025 07:10 PM    INR 1.03 02/18/2025 07:10 PM       ECHO:  11/29/24    Reduced left ventricular systolic function with a biplane LVEF of 40%.   Left ventricle is dilated. Normal wall thickness. Regional wall motion   abnormalities present. The distal inferior wall appears focally moderately   hypokinetic. Grade I diastolic dysfunction.     Mild aortic valvular regurgitation. No aortic valve stenosis.     Mild mitral valvular regurgitation. No mitral valve stenosis.     Left atrium is mildly dilated.     Right ventricle size is normal. Normal right ventricular systolic   function.      Limited echo 1/2025    Left Ventricle: Reduced left ventricular systolic function with a visually estimated EF of 30 - 35%. EF by 2D Simpsons Biplane is 37%. Left ventricle is dilated. Normal wall thickness. There are regional wall motion abnormalities.    Mitral Valve: Mild regurgitation.    Pericardium: Trivial pericardial effusion present. No indication of cardiac tamponade.    Image quality is technically difficult. Contrast used: Lumason. Technically difficult study with poor endocardial visualization.     Stress Test: 11/29/24    Abnormal rest-only SPECT myocardial perfusion images with a large ,   severe intensity  mid inferior, inferior lateral and basal inferior   lateral wall defects.     Stress Combined Conclusion   Abnormal rest-only SPECT myocardial perfusion images.      Cardiac Cath:12/2/2024    Mid LM to Dist LM lesion is 80%

## 2025-02-25 NOTE — PROGRESS NOTES
Blood sugar recheck 71. RN to reassess blood sugar in 15 minutes.     1415- recheck BG 55. MD notified, pt more alert, eating his lunch tray. RN gave patient a starry drink per pt request.     1444- Blood sugar rechecked by charge RN,BG 61. Charge RN gave pt juan crackers with peanut butter and juice. Will recheck BG in 15 minutes

## 2025-02-25 NOTE — PROGRESS NOTES
Received voicemail from Dr. Castillo's office stating they do not have insulin pump supplies available but that they emailed their [employer] rep requesting that they reach out to pt to assist with obtaining supplies.    Electronically signed by Alba Osborne RN [facility] on 2/25/2025 at 8:59 AM

## 2025-02-26 LAB
ANION GAP SERPL CALCULATED.3IONS-SCNC: 12 MMOL/L (ref 3–16)
BASOPHILS # BLD: 0 K/UL (ref 0–0.2)
BASOPHILS NFR BLD: 0.2 %
BUN SERPL-MCNC: 47 MG/DL (ref 7–20)
CALCIUM SERPL-MCNC: 9.1 MG/DL (ref 8.3–10.6)
CHLORIDE SERPL-SCNC: 95 MMOL/L (ref 99–110)
CO2 SERPL-SCNC: 29 MMOL/L (ref 21–32)
CREAT SERPL-MCNC: 10.1 MG/DL (ref 0.9–1.3)
DEPRECATED RDW RBC AUTO: 15.6 % (ref 12.4–15.4)
EKG ATRIAL RATE: 88 BPM
EKG DIAGNOSIS: NORMAL
EKG P AXIS: 47 DEGREES
EKG P-R INTERVAL: 152 MS
EKG Q-T INTERVAL: 384 MS
EKG QRS DURATION: 122 MS
EKG QTC CALCULATION (BAZETT): 464 MS
EKG R AXIS: 24 DEGREES
EKG T AXIS: 105 DEGREES
EKG VENTRICULAR RATE: 88 BPM
EOSINOPHIL # BLD: 0.1 K/UL (ref 0–0.6)
EOSINOPHIL NFR BLD: 2 %
GFR SERPLBLD CREATININE-BSD FMLA CKD-EPI: 5 ML/MIN/{1.73_M2}
GLUCOSE BLD-MCNC: 131 MG/DL (ref 70–99)
GLUCOSE BLD-MCNC: 206 MG/DL (ref 70–99)
GLUCOSE BLD-MCNC: 230 MG/DL (ref 70–99)
GLUCOSE BLD-MCNC: 92 MG/DL (ref 70–99)
GLUCOSE SERPL-MCNC: 125 MG/DL (ref 70–99)
HCT VFR BLD AUTO: 33 % (ref 40.5–52.5)
HGB BLD-MCNC: 10.7 G/DL (ref 13.5–17.5)
LYMPHOCYTES # BLD: 1.1 K/UL (ref 1–5.1)
LYMPHOCYTES NFR BLD: 17.8 %
MAGNESIUM SERPL-MCNC: 1.8 MG/DL (ref 1.8–2.4)
MCH RBC QN AUTO: 29.6 PG (ref 26–34)
MCHC RBC AUTO-ENTMCNC: 32.3 G/DL (ref 31–36)
MCV RBC AUTO: 91.6 FL (ref 80–100)
MONOCYTES # BLD: 1.1 K/UL (ref 0–1.3)
MONOCYTES NFR BLD: 18.3 %
NEUTROPHILS # BLD: 3.7 K/UL (ref 1.7–7.7)
NEUTROPHILS NFR BLD: 61.7 %
PERFORMED ON: ABNORMAL
PERFORMED ON: NORMAL
PLATELET # BLD AUTO: 183 K/UL (ref 135–450)
PMV BLD AUTO: 9.5 FL (ref 5–10.5)
POTASSIUM SERPL-SCNC: 4.2 MMOL/L (ref 3.5–5.1)
RBC # BLD AUTO: 3.6 M/UL (ref 4.2–5.9)
SODIUM SERPL-SCNC: 136 MMOL/L (ref 136–145)
WBC # BLD AUTO: 6 K/UL (ref 4–11)

## 2025-02-26 PROCEDURE — 36415 COLL VENOUS BLD VENIPUNCTURE: CPT

## 2025-02-26 PROCEDURE — 2500000003 HC RX 250 WO HCPCS: Performed by: INTERNAL MEDICINE

## 2025-02-26 PROCEDURE — 90935 HEMODIALYSIS ONE EVALUATION: CPT

## 2025-02-26 PROCEDURE — 83735 ASSAY OF MAGNESIUM: CPT

## 2025-02-26 PROCEDURE — 99233 SBSQ HOSP IP/OBS HIGH 50: CPT

## 2025-02-26 PROCEDURE — 2060000000 HC ICU INTERMEDIATE R&B

## 2025-02-26 PROCEDURE — 90945 DIALYSIS ONE EVALUATION: CPT

## 2025-02-26 PROCEDURE — 93010 ELECTROCARDIOGRAM REPORT: CPT | Performed by: INTERNAL MEDICINE

## 2025-02-26 PROCEDURE — 6370000000 HC RX 637 (ALT 250 FOR IP): Performed by: INTERNAL MEDICINE

## 2025-02-26 PROCEDURE — 6370000000 HC RX 637 (ALT 250 FOR IP): Performed by: NURSE PRACTITIONER

## 2025-02-26 PROCEDURE — 85025 COMPLETE CBC W/AUTO DIFF WBC: CPT

## 2025-02-26 PROCEDURE — 80048 BASIC METABOLIC PNL TOTAL CA: CPT

## 2025-02-26 RX ORDER — ISOSORBIDE MONONITRATE 30 MG/1
60 TABLET, EXTENDED RELEASE ORAL NIGHTLY
Status: DISCONTINUED | OUTPATIENT
Start: 2025-02-26 | End: 2025-02-28 | Stop reason: HOSPADM

## 2025-02-26 RX ORDER — AMLODIPINE BESYLATE 5 MG/1
5 TABLET ORAL DAILY
Status: DISCONTINUED | OUTPATIENT
Start: 2025-02-26 | End: 2025-02-27

## 2025-02-26 RX ADMIN — CLOPIDOGREL BISULFATE 75 MG: 75 TABLET ORAL at 08:31

## 2025-02-26 RX ADMIN — PREDNISONE 50 MG: 10 TABLET ORAL at 21:39

## 2025-02-26 RX ADMIN — TORSEMIDE 100 MG: 100 TABLET ORAL at 08:30

## 2025-02-26 RX ADMIN — AMLODIPINE BESYLATE 5 MG: 5 TABLET ORAL at 14:31

## 2025-02-26 RX ADMIN — ROSUVASTATIN 40 MG: 20 TABLET, FILM COATED ORAL at 19:40

## 2025-02-26 RX ADMIN — SERTRALINE HYDROCHLORIDE 50 MG: 50 TABLET ORAL at 19:40

## 2025-02-26 RX ADMIN — CARVEDILOL 12.5 MG: 6.25 TABLET, FILM COATED ORAL at 08:31

## 2025-02-26 RX ADMIN — SACUBITRIL AND VALSARTAN 1 TABLET: 49; 51 TABLET, FILM COATED ORAL at 19:40

## 2025-02-26 RX ADMIN — NITROGLYCERIN 0.4 MG: 0.4 TABLET SUBLINGUAL at 16:53

## 2025-02-26 RX ADMIN — METOCLOPRAMIDE HYDROCHLORIDE 10 MG: 10 TABLET ORAL at 14:31

## 2025-02-26 RX ADMIN — ISOSORBIDE MONONITRATE 60 MG: 30 TABLET, EXTENDED RELEASE ORAL at 19:40

## 2025-02-26 RX ADMIN — ASPIRIN 81 MG: 81 TABLET, COATED ORAL at 08:31

## 2025-02-26 RX ADMIN — METOCLOPRAMIDE HYDROCHLORIDE 10 MG: 10 TABLET ORAL at 19:40

## 2025-02-26 RX ADMIN — SACUBITRIL AND VALSARTAN 1 TABLET: 49; 51 TABLET, FILM COATED ORAL at 08:35

## 2025-02-26 RX ADMIN — LEVETIRACETAM 500 MG: 500 TABLET, FILM COATED ORAL at 08:31

## 2025-02-26 RX ADMIN — Medication 10 ML: at 08:31

## 2025-02-26 RX ADMIN — CALCITRIOL CAPSULES 0.25 MCG 0.25 MCG: 0.25 CAPSULE ORAL at 08:31

## 2025-02-26 RX ADMIN — RANOLAZINE 500 MG: 500 TABLET, EXTENDED RELEASE ORAL at 19:40

## 2025-02-26 RX ADMIN — RANOLAZINE 500 MG: 500 TABLET, EXTENDED RELEASE ORAL at 08:31

## 2025-02-26 RX ADMIN — METOCLOPRAMIDE HYDROCHLORIDE 10 MG: 10 TABLET ORAL at 08:31

## 2025-02-26 RX ADMIN — CARVEDILOL 12.5 MG: 6.25 TABLET, FILM COATED ORAL at 16:52

## 2025-02-26 RX ADMIN — Medication 10 ML: at 19:40

## 2025-02-26 ASSESSMENT — PAIN SCALES - GENERAL
PAINLEVEL_OUTOF10: 0
PAINLEVEL_OUTOF10: 0
PAINLEVEL_OUTOF10: 6
PAINLEVEL_OUTOF10: 0
PAINLEVEL_OUTOF10: 0

## 2025-02-26 ASSESSMENT — PAIN DESCRIPTION - ORIENTATION: ORIENTATION: LEFT

## 2025-02-26 ASSESSMENT — PAIN DESCRIPTION - LOCATION: LOCATION: CHEST

## 2025-02-26 ASSESSMENT — PAIN DESCRIPTION - DESCRIPTORS: DESCRIPTORS: ACHING

## 2025-02-26 NOTE — PROGRESS NOTES
Hospitalist Progress Note      Name:  Te Garay /Age/Sex: 1965  (59 y.o. male)   MRN & CSN:  [id number] & [id number] Encounter Date/Time: 2025 11:19 AM EST   Location:  [id number]- PCP: Reid Lei MD     Attending:Larry Fontanez MD       Hospital Day: 9    Subjective:   Chief Complaint: \"chest pain\"  Te Garay is a 59 y.o. male with a past medical history of hypertension, hyperlipidemia, HFrEF, KATIE, PVD, CAD/CABG who presented to hospital for [facility], found to have in-stent stenosis SVG-OM2. Admitted with unstable angina. S/p [facility]  revealed 99% in-stent stenosis SVG-OM2.  He was placed on hepatin gtt and treated medically for CP.  He needs OP referral to [facility] for PCI and cardiology has been arranging prior to discharge vs planning for transfer.   cardiology cleared patient for discharge and planning for OP PCI.        Interval History:  Today, he is resting in bed.  Had chest pain overnight I the middle f the night.  He also does have chronic nausea and is on Reglan prn at home. Denies SOB.  Denies abd pain, vomiting or diarrhea.     Independently reviewed interval ancillary notes from cardiology.     Assessment and Recommendations   Problem List  Principal Problem:    Unstable angina (HCC)  Active Problems:    PAD (peripheral artery disease)    Chronic systolic heart failure (HCC)    ESRD on peritoneal dialysis (HCC)    Type 1 diabetes mellitus (HCC)    Coronary artery disease due to calcified coronary lesion    S/P CABG (coronary artery bypass graft)    Worsening angina (HCC)  Resolved Problems:    * No resolved hospital problems. *     Assessment and Plan:    CAD  Chest Pain  - Hx CABG (), PCI of SVG-OM2 (2021)  - [facility]  revealed 99% in-stent stenosis SVG-OM2  - Remains on heparin drip, continue to follow anti-XA  - Cardiology has reached out to [facility] to arrange consultation for brachytherapy and complex PCI, either as inpatient or outpatient basis pending clinical  Emergency Department SIGN OUT NOTE     Patient: Shayy Trent Age: 89 year old Sex: female   MRN: [id number] : 1924 Encounter Date: 10/17/2020     Received Sign-Out From: ANGEL LANDIN  Dispo: admit MAR with surg onconsult for SBO   History & Course: 89 year old is a female PMH anemia, prior gastric ca s/p bulk tumor resection in early sept with Dr. Sahu here with abd pain, nausea, vomiting since this AM. SBO on imaging. Given ABX for possible neel (not evidence on RUQ US).  Pending items: NTD    ED Course as of Oct 17 1828   Sat Oct 17, 2020   1027 Nausea, vomiting, abdominal pain 2/2 Gastric mass w/ partial outlet obstruction S/p distal gastrectomy with Billroth II gastrojejunostomy reconstruction , Ulcerative esophagitis     [CC]   1030 On review of documentation, patient was hospitalized here from September 3 through 2020 after presenting with weeks of epigastric abdominal discomfort.    Found to have a gastric mass with partial outlet obstruction, S/p distal gastrectomy with Billroth II gastrojejunostomy reconstruction , briefly needed course of TPN.  Also noted to have ulcerative esophagitis.    [CC]   1412 CT results reviewed, concerning with dilated small bowel loops with a transition point in a patient with high clinical concern for obstructive pathology.  We will further discuss with surgical services for further disposition and management planning, will require admission.    [CC]   1437 CT esults reviewed, ER resident discussed with internal medicine service as well as general surgery service.  General surgery requesting lactate and placement of NG tube, otherwise will follow on admission    [CC]      ED Course User Index  [CC] Slime Marcum MD       Boarding Statement: This patient's care has been transferred to the primary team, however is currently boarding in the ED due to bed constraints. Unless otherwise noted, I have not evaluated the patient personally or had any active  involvement in the patient's care.      Vitals with min/max:    Vital Last Value 24 Hour Range   Temperature 98.2 °F (36.8 °C) (10/17/20 0845) Temp  Min: 98.2 °F (36.8 °C)  Max: 98.2 °F (36.8 °C)   Pulse (!) 111 (10/17/20 1629) Pulse  Min: 102  Max: 121   Respiratory 18 (10/17/20 1629) Resp  Min: 18  Max: 20   Non-Invasive  Blood Pressure (!) 166/94 (10/17/20 1629) BP  Min: 137/88  Max: 177/99   Pulse Oximetry 98 % (10/17/20 1629) SpO2  Min: 98 %  Max: 100 %   Arterial   Blood Pressure   No data recorded     Labs:   Results for orders placed or performed during the hospital encounter of 10/17/20   CBC with Automated Differential   Result Value Ref Range    WBC 15.6 (H) 4.2 - 11.0 K/mcL    RBC 5.37 (H) 4.00 - 5.20 mil/mcL    HGB 13.5 12.0 - 15.5 g/dL    HCT 43.6 36.0 - 46.5 %    MCV 81.2 78.0 - 100.0 fl    MCH 25.1 (L) 26.0 - 34.0 pg    MCHC 31.0 (L) 32.0 - 36.5 g/dL    RDW-CV 25.2 (H) 11.0 - 15.0 %     140 - 450 K/mcL    NRBC 0 0 /100 WBC    DIFF TYPE AUTOMATED DIFFERENTIAL     Neutrophil 90 %    LYMPH 7 %    MONO 3 %    EOSIN 0 %    BASO 0 %    Percent Immature Granuloctyes 0 %    Absolute Neutrophil 13.8 (H) 1.8 - 7.7 K/mcL    Absolute Lymph 1.1 1.0 - 4.0 K/mcL    Absolute Mono 0.5 0.3 - 0.9 K/mcL    Absolute Eos 0.0 (L) 0.1 - 0.5 K/mcL    Absolute Baso 0.1 0.0 - 0.3 K/mcL    Absolute Immature Granulocytes 0.1 0 - 0.2 K/mcl   Comprehensive Metabolic Panel   Result Value Ref Range    Sodium 141 135 - 145 mmol/L    Potassium 3.6 3.4 - 5.1 mmol/L    Chloride 108 (H) 98 - 107 mmol/L    Carbon Dioxide 28 21 - 32 mmol/L    Anion Gap 9 (L) 10 - 20 mmol/L    Glucose 122 (H) 65 - 99 mg/dL    BUN 13 6 - 20 mg/dL    Creatinine 0.57 0.51 - 0.95 mg/dL    GFR Estimate,  >90     GFR Estimate, Non African American 80     BUN/Creatinine Ratio 23 7 - 25    CALCIUM 8.4 8.4 - 10.2 mg/dL    TOTAL BILIRUBIN 0.7 0.2 - 1.0 mg/dL    AST/SGOT 13 <38 Units/L    ALT/SGPT 8 <64 Units/L    ALK PHOSPHATASE 77 45 - 117  Units/L    TOTAL PROTEIN 7.0 6.4 - 8.2 g/dL    Albumin 2.5 (L) 3.6 - 5.1 g/dL    GLOBULIN 4.5 (H) 2.0 - 4.0 g/dL    A/G Ratio, Serum 0.6 (L) 1.0 - 2.4   Lipase   Result Value Ref Range    Lipase <50 (L) 73 - 393 Units/L   Urinalysis & Reflex Microscopy With Culture If Indicated   Result Value Ref Range    COLOR SUNG (A) YELLOW    APPEARANCE CLOUDY     GLUCOSE(URINE) NEGATIVE NEGATIVE mg/dL    BILIRUBIN NEGATIVE NEGATIVE    KETONES 20 (A) NEGATIVE mg/dL    SPECIFIC GRAVITY 1.025 1.005 - 1.030    BLOOD NEGATIVE NEGATIVE    pH 6.0 5.0 - 7.0 Units    PROTEIN(URINE) NEGATIVE NEGATIVE mg/dL    UROBILINOGEN 4.0 (H) 0.0 - 1.0 mg/dL    NITRITE NEGATIVE NEGATIVE    LEUKOCYTE ESTERASE LARGE (A) NEGATIVE    SPECIMEN TYPE URINE CLEAN CATCH     Squamous EPI'S 1 to 5 0 - 5 /hpf    RBC 3 to 5 0 - 2 /hpf    WBC 11 to 25 0 - 5 /hpf    BACTERIA FEW (A) NONE SEEN /hpf    Hyaline Casts NONE SEEN 0 - 5 /lpf   Lactic Acid, Venous   Result Value Ref Range    Lactic Acid Venous 1.5 0 - 2.0 mmol/L     Imaging:   XR ABDOMEN 1 VIEW   Final Result   Distal tip of the nasogastric tube extends into the body of the stomach.               Electronically Signed by: MITA ZELAYA MD    Signed on: 10/17/2020 5:04 PM          US GALLBLADDER   Final Result      1.   Cholelithiasis without sonographic evidence of acute cholecystitis.      Electronically Signed by: NAM PICKENS MD    Signed on: 10/17/2020 4:53 PM          CT ABDOMEN PELVIS W CONTRAST - IV contrast only   Final Result      1. Patent gastrojejunostomy.  Most of the small bowel is dilated with an apparent transition point in the right midabdomen about 20 cm proximal to the ileocecal valve.  There is some apparent small bowel wall thickening but the appearance remains    nonspecific and could be related to adhesions.   2.  Mild ascites.   3.  Uncomplicated cholecystitis and diverticulosis.   4.  Fibroid uterus.      Electronically Signed by: LORI SALTER    Signed on: 10/17/2020 1:57 PM             EKG:   Encounter Date: 10/17/20   Electrocardiogram 12-Lead   Result Value    Ventricular Rate EKG/Min (BPM) 118    Atrial Rate (BPM) 118    MO-Interval (MSEC) 142    QRS-Interval (MSEC) 80    QT-Interval (MSEC) 350    QTc 490    P Axis (Degrees) -13    R Axis (Degrees) -55    T Axis (Degrees) -21    REPORT TEXT      Sinus tachycardia  with  premature supraventricular complexes  Left axis deviation  Low voltage QRS  Inferior infarct  , age undetermined  Abnormal ECG  No previous ECGs available          Medications received in the department:   ED Medication Orders (From admission, onward)    Ordered Start     Status Ordering Provider    10/17/20 1518 10/17/20 1519  lactated ringers bolus 1,000 mL  ONCE      Last MAR action: New Bag OSITELU, OLUWARANTI A    10/17/20 1518 10/17/20 1519  cefTRIAXone (ROCEPHIN) syringe 2,000 mg  ONCE      Last MAR action: Given OSITELU, OLUWARANTI A    10/17/20 1518 10/17/20 1519  metroNIDAZOLE (FLAGYL) IVPB 500 mg  ONCE      Last MAR action: New Bag OSITELU, OLUWARANTI A    10/17/20 0931 10/17/20 0932  sodium chloride (NORMAL SALINE) 0.9 % bolus 1,000 mL  ONCE      Last MAR action: ADEOLA Stock participated in the care of this patient and this note provides additional information regarding their visit. Please refer to attending note for further details regarding history, physical exam, workup, medical decision making, and disposition. This note may have been created using voice dictation technology and may include inadvertent errors.         Amarjit Frederick MD  Resident  10/17/20 1227     08:55 AM    LEUKOCYTESUR SMALL 02/17/2025 08:55 AM    UROBILINOGEN 1.0 02/17/2025 08:55 AM    BILIRUBINUR Negative 02/17/2025 08:55 AM    BLOODU Negative 02/17/2025 08:55 AM    GLUCOSEU 250 02/17/2025 08:55 AM    GLUCOSEU >=1000 05/25/2010 07:19 PM    KETUA TRACE 02/17/2025 08:55 AM    AMORPHOUS 2+ 12/07/2021 05:08 AM     Urine Cultures:   Lab Results   Component Value Date/Time    LABURIN No growth at 18 to 36 hours 11/12/2024 09:50 PM     Blood Cultures:   Lab Results   Component Value Date/Time    BC No Growth after 4 days of incubation. 11/10/2024 09:52 PM     Lab Results   Component Value Date/Time    BLOODCULT2 No Growth after 4 days of incubation. 11/10/2024 09:52 PM     Organism:   Lab Results   Component Value Date/Time    ORG Staphylococcus epidermidis 11/10/2024 10:47 PM       Personally reviewed labs, diagnostic, device, and imaging results reviewed as a part of this visit    Electronically signed by JAYLEN Pedro CNP on 2/26/2025 at 12:01 PM

## 2025-02-26 NOTE — PROGRESS NOTES
[facility]   Nephrology progress Note  [phone]  [phone]   [employer]         Reason for Consult:  ESRD    HISTORY OF PRESENT ILLNESS:      Te Garay is a 59 y.o. male with a PMHx of ESRD on PD, CAD/CABG/CHF, carotid artery stenosis, DM 2, diabetic neuropathy, hypertension, hyperlipidemia, seizure disorder.   He had fifth digit amputation of right foot on 2/17, reports receiving 3L of fluid on that day, then was admitted on 2/18 for planned elective LHC that was completed. We were consulted for PD management.   LHC showed SVG-OM2: 70% proximal, in-stent, 99% mid, in-stent. LVEDP 36mmHg.   Did not get PD the night of presentation.   Reports that his prescription is 10hrs, 4 cycles, 11L total, 2.2L with 2L extraneal.   He has residual function, urinates ~1L /day .  We are following for PD management.     Interval history:   Dialyzed with 2.5% dextrose last night. 473mL UF noted.   No issued with PD reported.   He reports having chest pain yesterday when walking to the bathroom. He was planned for discharge but that was cancelled, and he was placed NPO .       PHYSICAL EXAM:    Vitals:    BP (!) 146/59   Pulse 85   Temp 97.7 °F (36.5 °C) (Temporal)   Resp 16   Ht 1.727 m (5' 8\")   Wt 82.3 kg (181 lb 7 oz)   SpO2 98%   BMI 27.59 kg/m²   I/O last 3 completed shifts:  In: 250 [P.O.:240; I.V.:10]  Out: 525 [Urine:525]  No intake/output data recorded.    Physical Exam:  Gen:  alert, oriented x 3  PERRL , EOM +  Pallor +, No icterus  JVP not raised   CV: RRR no murmur or rub .  Lungs:B/ L air entry, Normal breath sounds   Abd: soft, bowel sounds + , No organomegaly   Ext: No edema, no cyanosis  Skin: Warm.  No rash  Neuro: nonfocal.      DATA:    CBC:   Lab Results   Component Value Date/Time    WBC 6.0 02/26/2025 04:45 AM    RBC 3.60 02/26/2025 04:45 AM    HGB 10.7 02/26/2025 04:45 AM    HCT 33.0 02/26/2025 04:45 AM    MCV 91.6 02/26/2025 04:45 AM    MCH 29.6 02/26/2025 04:45

## 2025-02-26 NOTE — PLAN OF CARE
status  Outcome: Progressing     Problem: Respiratory - Adult  Goal: Achieves optimal ventilation and oxygenation  Outcome: Progressing     Problem: Gastrointestinal - Adult  Goal: Minimal or absence of nausea and vomiting  Outcome: Progressing  Goal: Maintains or returns to baseline bowel function  Outcome: Progressing  Goal: Maintains adequate nutritional intake  Outcome: Progressing     Problem: Genitourinary - Adult  Goal: Absence of urinary retention  Outcome: Progressing     Problem: Behavior  Goal: Pt/Family maintain appropriate behavior and adhere to behavioral management agreement, if implemented  Description: INTERVENTIONS:  1. Assess patient/family's coping skills and  non-compliant behavior (including use of illegal substances)  2. Notify security of behavior or suspected illegal substances which indicate the need for search of the family and/or belongings  3. Encourage verbalization of thoughts and concerns in a socially appropriate manner  4. Utilize positive, consistent limit setting strategies supporting safety of patient, staff and others  5. Encourage participation in the decision making process about the behavioral management agreement  6. If a visitor's behavior poses a threat to safety call refer to organization policy.  7. Initiate consult with , Psychosocial CNS, Spiritual Care as appropriate  Outcome: Progressing

## 2025-02-26 NOTE — PROGRESS NOTES
Nurse alerted that pt was having 6/10 CP while lying in bed. Denies HA, vision change. No diaphoresis noted. Administered SL nitro per PRN order. Pt stating pain had subsided 0/10 after administration. Pt now resting comfortably in bed.

## 2025-02-26 NOTE — PROGRESS NOTES
Hospitalist Progress Note      Name:  Te Garay /Age/Sex: 1965  (59 y.o. male)   MRN & CSN:  [id number] & [id number] Encounter Date/Time: 2025 11:19 AM EST   Location:  [id number] PCP: Reid Lei MD     Attending:Larry Fontanez MD       Hospital Day: 9    Subjective:   Chief Complaint: \"chest pain\"  Te Garay is a 59 y.o. male with a past medical history of hypertension, hyperlipidemia, HFrEF, KATIE, PVD, CAD/CABG who presented to hospital for [facility], found to have in-stent stenosis SVG-OM2. Admitted with unstable angina. S/p [facility]  revealed 99% in-stent stenosis SVG-OM2.  He was placed on hepatin gtt and treated medically for CP.  He needs OP referral to [facility] for PCI and cardiology has been arranging prior to discharge vs planning for transfer.   cardiology cleared patient for discharge and planning for OP PCI.        Interval History:  Today, he is resting in bed.  He had chest pain overnight.  He remains on heparin gtt.  He denies SOB.  He is going to run out of his insulin pump supplies as his shipment was delayed.      Independently reviewed interval ancillary notes from cardiology.     Assessment and Recommendations   Problem List  Principal Problem:    Unstable angina (HCC)  Active Problems:    PAD (peripheral artery disease)    Chronic systolic heart failure (HCC)    ESRD on peritoneal dialysis (HCC)    Type 1 diabetes mellitus (HCC)    Coronary artery disease due to calcified coronary lesion    S/P CABG (coronary artery bypass graft)    Worsening angina (HCC)  Resolved Problems:    * No resolved hospital problems. *     Assessment and Plan:    CAD  Chest Pain  - Hx CABG (), PCI of SVG-OM2 (2021)  - [facility]  revealed 99% in-stent stenosis SVG-OM2  - Remains on heparin drip, continue to follow anti-XA  - Cardiology has reached out to [facility] to arrange consultation for brachytherapy and complex PCI, either as inpatient or outpatient basis pending clinical course.

## 2025-02-26 NOTE — PROGRESS NOTES
CCPD Order              Exchanges: 4              Exchange Volume: 2500 ml              Total Time: 10 hrs              Dextrose: 2.5%              Last Fill: 0 ml              Total Volume: 28007 ml     Orders verified. Supplies taken to pt's room. Report received. Cycler set up, primed and pre tested. [facility]. Pt connected to cycler. CCPD initiated without problem. Initial effluent clear.         If problems should arise please call the [phone] on top of PD cycler machine.

## 2025-02-26 NOTE — PROGRESS NOTES
Treatment time: 10 Hours 10 minutes  Net UF:  473 ml  Dwell Time:  25 minutes Gained     Treatment completed without complications or complaints from patient. Effluent clear yellow and lines taped to patient per protocol. Patient resting comfortably with VSS upon exiting room.      Copy of dialysis treatment record placed in chart, to be scanned into EMR and report given to RN.

## 2025-02-26 NOTE — PROGRESS NOTES
Transfer initiated to [facility].  Reviewed case, [place] NP with clinical cardiology at [facility] who was aware of his case and upcoming appt with Dr. Tang on 3/3.  Recommendation was that he NOT be transferred at this time as they do not feel treatment would change if he were to be transferred.  Transfer was declined. NISHA Leger recommended adding amlodipine and uptitrate antianginals like Ranexa and Imdur to improve symptoms and if able discharge with OP follow up with Dr. Camara.     Discussed with Dr. Celestin. Increased Imdur to 60 mg daily and add amlodipine as per recs from [facility] NP.      I have discussed with Olivia Rich multiple times today.      JAYLEN Pedro-CNP

## 2025-02-27 DIAGNOSIS — I25.10 CORONARY ARTERY DISEASE DUE TO LIPID RICH PLAQUE: Primary | ICD-10-CM

## 2025-02-27 DIAGNOSIS — I25.83 CORONARY ARTERY DISEASE DUE TO LIPID RICH PLAQUE: Primary | ICD-10-CM

## 2025-02-27 LAB
ANION GAP SERPL CALCULATED.3IONS-SCNC: 12 MMOL/L (ref 3–16)
BASOPHILS # BLD: 0 K/UL (ref 0–0.2)
BASOPHILS NFR BLD: 0.1 %
BUN SERPL-MCNC: 48 MG/DL (ref 7–20)
CALCIUM SERPL-MCNC: 9.5 MG/DL (ref 8.3–10.6)
CHLORIDE SERPL-SCNC: 92 MMOL/L (ref 99–110)
CO2 SERPL-SCNC: 27 MMOL/L (ref 21–32)
CREAT SERPL-MCNC: 9.7 MG/DL (ref 0.9–1.3)
DEPRECATED RDW RBC AUTO: 15.7 % (ref 12.4–15.4)
ECHO BSA: 2.04 M2
EKG ATRIAL RATE: 103 BPM
EKG DIAGNOSIS: NORMAL
EKG P AXIS: 22 DEGREES
EKG P-R INTERVAL: 148 MS
EKG Q-T INTERVAL: 354 MS
EKG QRS DURATION: 116 MS
EKG QTC CALCULATION (BAZETT): 463 MS
EKG R AXIS: 63 DEGREES
EKG T AXIS: -37 DEGREES
EKG VENTRICULAR RATE: 103 BPM
EOSINOPHIL # BLD: 0 K/UL (ref 0–0.6)
EOSINOPHIL NFR BLD: 0 %
GFR SERPLBLD CREATININE-BSD FMLA CKD-EPI: 6 ML/MIN/{1.73_M2}
GLUCOSE BLD-MCNC: 104 MG/DL (ref 70–99)
GLUCOSE BLD-MCNC: 218 MG/DL (ref 70–99)
GLUCOSE BLD-MCNC: 233 MG/DL (ref 70–99)
GLUCOSE BLD-MCNC: 364 MG/DL (ref 70–99)
GLUCOSE BLD-MCNC: 376 MG/DL (ref 70–99)
GLUCOSE BLD-MCNC: >600 MG/DL (ref 70–99)
GLUCOSE SERPL-MCNC: 336 MG/DL (ref 70–99)
HCT VFR BLD AUTO: 35.5 % (ref 40.5–52.5)
HGB BLD-MCNC: 11.6 G/DL (ref 13.5–17.5)
LYMPHOCYTES # BLD: 0.4 K/UL (ref 1–5.1)
LYMPHOCYTES NFR BLD: 4.9 %
MCH RBC QN AUTO: 29.5 PG (ref 26–34)
MCHC RBC AUTO-ENTMCNC: 32.8 G/DL (ref 31–36)
MCV RBC AUTO: 90.1 FL (ref 80–100)
MONOCYTES # BLD: 0.2 K/UL (ref 0–1.3)
MONOCYTES NFR BLD: 1.9 %
NEUTROPHILS # BLD: 7.4 K/UL (ref 1.7–7.7)
NEUTROPHILS NFR BLD: 93.1 %
PERFORMED ON: ABNORMAL
PLATELET # BLD AUTO: 210 K/UL (ref 135–450)
PMV BLD AUTO: 9.6 FL (ref 5–10.5)
POC ACT LR: 305 SEC
POTASSIUM SERPL-SCNC: 4.4 MMOL/L (ref 3.5–5.1)
POTASSIUM SERPL-SCNC: 5.5 MMOL/L (ref 3.5–5.1)
RBC # BLD AUTO: 3.94 M/UL (ref 4.2–5.9)
SODIUM SERPL-SCNC: 131 MMOL/L (ref 136–145)
WBC # BLD AUTO: 8 K/UL (ref 4–11)

## 2025-02-27 PROCEDURE — 2709999900 HC NON-CHARGEABLE SUPPLY: Performed by: INTERNAL MEDICINE

## 2025-02-27 PROCEDURE — C1769 GUIDE WIRE: HCPCS | Performed by: INTERNAL MEDICINE

## 2025-02-27 PROCEDURE — 6370000000 HC RX 637 (ALT 250 FOR IP): Performed by: INTERNAL MEDICINE

## 2025-02-27 PROCEDURE — C9610 HC CATH CORONARY DRUG-DELIVERY: HCPCS | Performed by: INTERNAL MEDICINE

## 2025-02-27 PROCEDURE — 93005 ELECTROCARDIOGRAM TRACING: CPT | Performed by: INTERNAL MEDICINE

## 2025-02-27 PROCEDURE — 99152 MOD SED SAME PHYS/QHP 5/>YRS: CPT | Performed by: INTERNAL MEDICINE

## 2025-02-27 PROCEDURE — 90945 DIALYSIS ONE EVALUATION: CPT

## 2025-02-27 PROCEDURE — 6360000004 HC RX CONTRAST MEDICATION: Performed by: INTERNAL MEDICINE

## 2025-02-27 PROCEDURE — 6360000002 HC RX W HCPCS: Performed by: INTERNAL MEDICINE

## 2025-02-27 PROCEDURE — 6370000000 HC RX 637 (ALT 250 FOR IP): Performed by: STUDENT IN AN ORGANIZED HEALTH CARE EDUCATION/TRAINING PROGRAM

## 2025-02-27 PROCEDURE — 6370000000 HC RX 637 (ALT 250 FOR IP): Performed by: NURSE PRACTITIONER

## 2025-02-27 PROCEDURE — C1894 INTRO/SHEATH, NON-LASER: HCPCS | Performed by: INTERNAL MEDICINE

## 2025-02-27 PROCEDURE — 85347 COAGULATION TIME ACTIVATED: CPT

## 2025-02-27 PROCEDURE — 85025 COMPLETE CBC W/AUTO DIFF WBC: CPT

## 2025-02-27 PROCEDURE — 36415 COLL VENOUS BLD VENIPUNCTURE: CPT

## 2025-02-27 PROCEDURE — 99153 MOD SED SAME PHYS/QHP EA: CPT | Performed by: INTERNAL MEDICINE

## 2025-02-27 PROCEDURE — 6360000002 HC RX W HCPCS: Performed by: NURSE PRACTITIONER

## 2025-02-27 PROCEDURE — 2500000003 HC RX 250 WO HCPCS: Performed by: INTERNAL MEDICINE

## 2025-02-27 PROCEDURE — C1887 CATHETER, GUIDING: HCPCS | Performed by: INTERNAL MEDICINE

## 2025-02-27 PROCEDURE — 027034Z DILATION OF CORONARY ARTERY, ONE ARTERY WITH DRUG-ELUTING INTRALUMINAL DEVICE, PERCUTANEOUS APPROACH: ICD-10-PCS | Performed by: INTERNAL MEDICINE

## 2025-02-27 PROCEDURE — 93010 ELECTROCARDIOGRAM REPORT: CPT | Performed by: INTERNAL MEDICINE

## 2025-02-27 PROCEDURE — 84132 ASSAY OF SERUM POTASSIUM: CPT

## 2025-02-27 PROCEDURE — 0914T PRQ TCAT THR RX NTRC BAL SEP: CPT | Performed by: INTERNAL MEDICINE

## 2025-02-27 PROCEDURE — C1725 CATH, TRANSLUMIN NON-LASER: HCPCS | Performed by: INTERNAL MEDICINE

## 2025-02-27 PROCEDURE — 2060000000 HC ICU INTERMEDIATE R&B

## 2025-02-27 PROCEDURE — 80048 BASIC METABOLIC PNL TOTAL CA: CPT

## 2025-02-27 PROCEDURE — C1874 STENT, COATED/COV W/DEL SYS: HCPCS | Performed by: INTERNAL MEDICINE

## 2025-02-27 PROCEDURE — C1760 CLOSURE DEV, VASC: HCPCS | Performed by: INTERNAL MEDICINE

## 2025-02-27 PROCEDURE — C9604 PERC D-E COR REVASC T CABG S: HCPCS | Performed by: INTERNAL MEDICINE

## 2025-02-27 DEVICE — EVEROLIMUS-ELUTING PLATINUM CHROMIUM CORONARY STENT SYSTEM
Type: IMPLANTABLE DEVICE | Status: FUNCTIONAL
Brand: SYNERGY™ XD

## 2025-02-27 RX ORDER — NITROGLYCERIN 20 MG/100ML
INJECTION INTRAVENOUS CONTINUOUS PRN
Status: COMPLETED | OUTPATIENT
Start: 2025-02-27 | End: 2025-02-27

## 2025-02-27 RX ORDER — OXYCODONE AND ACETAMINOPHEN 5; 325 MG/1; MG/1
2 TABLET ORAL EVERY 4 HOURS PRN
Status: DISCONTINUED | OUTPATIENT
Start: 2025-02-27 | End: 2025-02-28 | Stop reason: HOSPADM

## 2025-02-27 RX ORDER — DIPHENHYDRAMINE HYDROCHLORIDE 50 MG/ML
INJECTION INTRAMUSCULAR; INTRAVENOUS PRN
Status: DISCONTINUED | OUTPATIENT
Start: 2025-02-27 | End: 2025-02-27 | Stop reason: HOSPADM

## 2025-02-27 RX ORDER — DIPHENHYDRAMINE HYDROCHLORIDE 50 MG/ML
25 INJECTION INTRAMUSCULAR; INTRAVENOUS
Status: DISCONTINUED | OUTPATIENT
Start: 2025-02-27 | End: 2025-02-27 | Stop reason: HOSPADM

## 2025-02-27 RX ORDER — METOCLOPRAMIDE 10 MG/1
5 TABLET ORAL 3 TIMES DAILY
Status: DISCONTINUED | OUTPATIENT
Start: 2025-02-27 | End: 2025-02-28 | Stop reason: HOSPADM

## 2025-02-27 RX ORDER — SODIUM CHLORIDE 0.9 % (FLUSH) 0.9 %
5-40 SYRINGE (ML) INJECTION EVERY 12 HOURS SCHEDULED
Status: DISCONTINUED | OUTPATIENT
Start: 2025-02-27 | End: 2025-02-27 | Stop reason: HOSPADM

## 2025-02-27 RX ORDER — HEPARIN SODIUM 1000 [USP'U]/ML
INJECTION, SOLUTION INTRAVENOUS; SUBCUTANEOUS PRN
Status: DISCONTINUED | OUTPATIENT
Start: 2025-02-27 | End: 2025-02-27 | Stop reason: HOSPADM

## 2025-02-27 RX ORDER — METOCLOPRAMIDE 10 MG/1
10 TABLET ORAL 3 TIMES DAILY
Status: DISCONTINUED | OUTPATIENT
Start: 2025-02-27 | End: 2025-02-27

## 2025-02-27 RX ORDER — ACETAMINOPHEN 325 MG/1
650 TABLET ORAL EVERY 4 HOURS PRN
Status: DISCONTINUED | OUTPATIENT
Start: 2025-02-27 | End: 2025-02-28 | Stop reason: HOSPADM

## 2025-02-27 RX ORDER — SODIUM CHLORIDE 0.9 % (FLUSH) 0.9 %
5-40 SYRINGE (ML) INJECTION EVERY 12 HOURS SCHEDULED
Status: DISCONTINUED | OUTPATIENT
Start: 2025-02-27 | End: 2025-02-28 | Stop reason: HOSPADM

## 2025-02-27 RX ORDER — FENTANYL CITRATE 50 UG/ML
INJECTION, SOLUTION INTRAMUSCULAR; INTRAVENOUS PRN
Status: DISCONTINUED | OUTPATIENT
Start: 2025-02-27 | End: 2025-02-27 | Stop reason: HOSPADM

## 2025-02-27 RX ORDER — SODIUM CHLORIDE 0.9 % (FLUSH) 0.9 %
5-40 SYRINGE (ML) INJECTION PRN
Status: DISCONTINUED | OUTPATIENT
Start: 2025-02-27 | End: 2025-02-28 | Stop reason: HOSPADM

## 2025-02-27 RX ORDER — MIDAZOLAM HYDROCHLORIDE 1 MG/ML
INJECTION, SOLUTION INTRAMUSCULAR; INTRAVENOUS PRN
Status: DISCONTINUED | OUTPATIENT
Start: 2025-02-27 | End: 2025-02-27 | Stop reason: HOSPADM

## 2025-02-27 RX ORDER — OXYCODONE AND ACETAMINOPHEN 5; 325 MG/1; MG/1
1 TABLET ORAL EVERY 4 HOURS PRN
Status: DISCONTINUED | OUTPATIENT
Start: 2025-02-27 | End: 2025-02-28 | Stop reason: HOSPADM

## 2025-02-27 RX ORDER — SODIUM CHLORIDE 9 MG/ML
INJECTION, SOLUTION INTRAVENOUS CONTINUOUS
Status: DISCONTINUED | OUTPATIENT
Start: 2025-02-27 | End: 2025-02-28

## 2025-02-27 RX ORDER — SODIUM CHLORIDE 9 MG/ML
INJECTION, SOLUTION INTRAVENOUS PRN
Status: DISCONTINUED | OUTPATIENT
Start: 2025-02-27 | End: 2025-02-28 | Stop reason: HOSPADM

## 2025-02-27 RX ORDER — SODIUM CHLORIDE 0.9 % (FLUSH) 0.9 %
5-40 SYRINGE (ML) INJECTION PRN
Status: DISCONTINUED | OUTPATIENT
Start: 2025-02-27 | End: 2025-02-27 | Stop reason: HOSPADM

## 2025-02-27 RX ORDER — SODIUM CHLORIDE 9 MG/ML
INJECTION, SOLUTION INTRAVENOUS PRN
Status: DISCONTINUED | OUTPATIENT
Start: 2025-02-27 | End: 2025-02-27 | Stop reason: HOSPADM

## 2025-02-27 RX ORDER — IOPAMIDOL 755 MG/ML
INJECTION, SOLUTION INTRAVASCULAR PRN
Status: DISCONTINUED | OUTPATIENT
Start: 2025-02-27 | End: 2025-02-27 | Stop reason: HOSPADM

## 2025-02-27 RX ADMIN — PREDNISONE 50 MG: 10 TABLET ORAL at 03:35

## 2025-02-27 RX ADMIN — Medication 10 ML: at 08:43

## 2025-02-27 RX ADMIN — LEVETIRACETAM 500 MG: 500 TABLET, FILM COATED ORAL at 08:41

## 2025-02-27 RX ADMIN — CLOPIDOGREL BISULFATE 75 MG: 75 TABLET ORAL at 08:41

## 2025-02-27 RX ADMIN — RANOLAZINE 500 MG: 500 TABLET, EXTENDED RELEASE ORAL at 21:39

## 2025-02-27 RX ADMIN — METOCLOPRAMIDE 5 MG: 10 TABLET ORAL at 21:39

## 2025-02-27 RX ADMIN — METOCLOPRAMIDE HYDROCHLORIDE 10 MG: 10 TABLET ORAL at 08:41

## 2025-02-27 RX ADMIN — ONDANSETRON 4 MG: 2 INJECTION INTRAMUSCULAR; INTRAVENOUS at 12:01

## 2025-02-27 RX ADMIN — NITROGLYCERIN 0.4 MG: 0.4 TABLET SUBLINGUAL at 06:17

## 2025-02-27 RX ADMIN — ROSUVASTATIN 40 MG: 20 TABLET, FILM COATED ORAL at 21:39

## 2025-02-27 RX ADMIN — AMLODIPINE BESYLATE 5 MG: 5 TABLET ORAL at 08:41

## 2025-02-27 RX ADMIN — RANOLAZINE 500 MG: 500 TABLET, EXTENDED RELEASE ORAL at 08:40

## 2025-02-27 RX ADMIN — ONDANSETRON 4 MG: 2 INJECTION INTRAMUSCULAR; INTRAVENOUS at 06:42

## 2025-02-27 RX ADMIN — GENTAMICIN SULFATE: 1 CREAM TOPICAL at 20:23

## 2025-02-27 RX ADMIN — MELATONIN TAB 3 MG 6 MG: 3 TAB at 21:48

## 2025-02-27 RX ADMIN — HYDROMORPHONE HYDROCHLORIDE 1 MG: 1 INJECTION, SOLUTION INTRAMUSCULAR; INTRAVENOUS; SUBCUTANEOUS at 01:35

## 2025-02-27 RX ADMIN — CARVEDILOL 12.5 MG: 6.25 TABLET, FILM COATED ORAL at 08:40

## 2025-02-27 RX ADMIN — ASPIRIN 81 MG: 81 TABLET, COATED ORAL at 08:41

## 2025-02-27 RX ADMIN — PREDNISONE 50 MG: 10 TABLET ORAL at 08:40

## 2025-02-27 RX ADMIN — CARVEDILOL 12.5 MG: 6.25 TABLET, FILM COATED ORAL at 17:04

## 2025-02-27 RX ADMIN — CALCITRIOL CAPSULES 0.25 MCG 0.25 MCG: 0.25 CAPSULE ORAL at 08:40

## 2025-02-27 RX ADMIN — SACUBITRIL AND VALSARTAN 1 TABLET: 49; 51 TABLET, FILM COATED ORAL at 08:40

## 2025-02-27 RX ADMIN — METOCLOPRAMIDE 5 MG: 10 TABLET ORAL at 17:45

## 2025-02-27 RX ADMIN — Medication 10 ML: at 21:41

## 2025-02-27 RX ADMIN — ISOSORBIDE MONONITRATE 60 MG: 30 TABLET, EXTENDED RELEASE ORAL at 21:39

## 2025-02-27 RX ADMIN — SODIUM ZIRCONIUM CYCLOSILICATE 10 G: 10 POWDER, FOR SUSPENSION ORAL at 08:39

## 2025-02-27 RX ADMIN — SACUBITRIL AND VALSARTAN 1 TABLET: 49; 51 TABLET, FILM COATED ORAL at 21:39

## 2025-02-27 RX ADMIN — SERTRALINE HYDROCHLORIDE 50 MG: 50 TABLET ORAL at 21:39

## 2025-02-27 ASSESSMENT — PAIN SCALES - GENERAL
PAINLEVEL_OUTOF10: 1
PAINLEVEL_OUTOF10: 7
PAINLEVEL_OUTOF10: 1
PAINLEVEL_OUTOF10: 7
PAINLEVEL_OUTOF10: 3
PAINLEVEL_OUTOF10: 1
PAINLEVEL_OUTOF10: 0
PAINLEVEL_OUTOF10: 2

## 2025-02-27 ASSESSMENT — PAIN DESCRIPTION - LOCATION: LOCATION: CHEST

## 2025-02-27 ASSESSMENT — PAIN DESCRIPTION - DESCRIPTORS: DESCRIPTORS: PRESSURE

## 2025-02-27 NOTE — PROGRESS NOTES
Hospitalist Progress Note      Name:  Te Garay /Age/Sex: 1965  (59 y.o. male)   MRN & CSN:  [id number] & [id number] Encounter Date/Time: 2025 11:19 AM EST   Location:  [id number] PCP: Reid Lei MD     Attending:Larry Fontanez MD       Hospital Day: 10    Subjective:   Chief Complaint: \"chest pain\"  Te Garay is a 59 y.o. male with a past medical history of hypertension, hyperlipidemia, HFrEF, KATIE, PVD, CAD/CABG who presented to hospital for [facility], found to have in-stent stenosis SVG-OM2. Admitted with unstable angina. S/p [facility]  revealed 99% in-stent stenosis SVG-OM2.  He was placed on hepatin gtt and treated medically for CP.  He needs OP referral to [facility] for PCI and cardiology has been arranging prior to discharge vs planning for transfer.   cardiology cleared patient for discharge and planning for OP PCI.        Interval History:  Today, he is resting in bed.  He had episode of CP overnight, received nitroglycerin and became hypotensive, now it is better.     Independently reviewed interval ancillary notes from cardiology.     Assessment and Recommendations   Problem List  Principal Problem:    Unstable angina (HCC)  Active Problems:    PAD (peripheral artery disease)    Chronic systolic heart failure (HCC)    ESRD on peritoneal dialysis (HCC)    Type 1 diabetes mellitus (HCC)    Coronary artery disease due to calcified coronary lesion    S/P CABG (coronary artery bypass graft)    Worsening angina ([facility])  Resolved Problems:    * No resolved hospital problems. *     Assessment and Plan:    CAD  Chest Pain  - Hx CABG (), PCI of SVG-OM2 (2021)  - [facility]  revealed 99% in-stent stenosis SVG-OM2  - Remains on heparin drip, continue to follow anti-XA  - Cardiology has reached out to [facility] to arrange consultation for brachytherapy and complex PCI, either as inpatient or outpatient basis pending clinical course. Awaiting final recs.   - Continue asa 81 mg, Plavix 75 mg,  days of incubation. 11/10/2024 09:52 PM     Lab Results   Component Value Date/Time    BLOODCULT2 No Growth after 4 days of incubation. 11/10/2024 09:52 PM     Organism:   Lab Results   Component Value Date/Time    ORG Staphylococcus epidermidis 11/10/2024 10:47 PM       Personally reviewed labs, diagnostic, device, and imaging results reviewed as a part of this visit    Electronically signed by JAYLEN Pedro CNP on 2/27/2025 at 8:47 AM

## 2025-02-27 NOTE — PROGRESS NOTES
Pharmacy Note - Renal dose adjustment made per P/T protocol    Original order:  Metoclopramide 10mg TID    Estimated Creatinine Clearance: 9 mL/min (A) (based on SCr of 9.7 mg/dL (HH)).    Recent Labs     02/25/25  0446 02/26/25  0445 02/27/25  0456   BUN 48* 47* 48*   CREATININE 10.3* 10.1* 9.7*       Renally adjusted order:  Metoclopramide 5mg TID    Please call pharmacy with any questions.    Thank you,  Miah Francisco [facility]  2/27/2025 10:40 AM

## 2025-02-27 NOTE — PROGRESS NOTES
[facility]   Nephrology progress Note  [phone]  [phone]   [email or web address]         Reason for Consult:  ESRD    HISTORY OF PRESENT ILLNESS:      Te Garay is a 59 y.o. male with a PMHx of ESRD on PD, CAD/CABG/CHF, carotid artery stenosis, DM 2, diabetic neuropathy, hypertension, hyperlipidemia, seizure disorder.   He had fifth digit amputation of right foot on 2/17, reports receiving 3L of fluid on that day, then was admitted on 2/18 for planned elective LHC that was completed. We were consulted for PD management.   LHC showed SVG-OM2: 70% proximal, in-stent, 99% mid, in-stent. LVEDP 36mmHg.   Did not get PD the night of presentation.   Reports that his prescription is 10hrs, 4 cycles, 11L total, 2.2L with 2L extraneal.   He has residual function, urinates ~1L /day .  We are following for PD management.     Interval history:   Dialyzed with 2.5% dextrose last night. 2009mL UF noted.   No issued with PD reported.   Continues to have intermittent chest pain.Noted that transfer to inpatient at [facility] was declined, with plan for PCI of SVG-OM today by cardiology.   Amlodipine added, imdur increased.       PHYSICAL EXAM:    Vitals:    /71   Pulse (!) 103   Temp 98.6 °F (37 °C) (Temporal)   Resp 17   Ht 1.727 m (5' 7.99\")   Wt 83.3 kg (183 lb 10.3 oz)   SpO2 100%   BMI 27.93 kg/m²   I/O last 3 completed shifts:  In: -   Out: 650 [Urine:650]  No intake/output data recorded.    Physical Exam:  Gen:  alert, oriented x 3  CV: RRR no murmur or rub .  Lungs:B/ L air entry, Normal breath sounds   Abd: soft, bowel sounds  Ext: No edema, no cyanosis  Skin: Warm.  No rash  Neuro: nonfocal.      DATA:    CBC:   Lab Results   Component Value Date/Time    WBC 8.0 02/27/2025 04:56 AM    RBC 3.94 02/27/2025 04:56 AM    HGB 11.6 02/27/2025 04:56 AM    HCT 35.5 02/27/2025 04:56 AM    MCV 90.1 02/27/2025 04:56 AM    MCH 29.5 02/27/2025 04:56 AM    MCHC 32.8 02/27/2025 04:56 AM    RDW 15.7

## 2025-02-27 NOTE — PROGRESS NOTES
Treatment time: 10 Hours 50 minutes  Net UF:  2009 ml  Dwell Time: 44 minutes Gained     Treatment completed without complications or complaints from patient. Effluent clear yellow and lines taped to patient per protocol. Patient resting comfortably with VSS upon exiting room.      Copy of dialysis treatment record placed in chart, to be scanned into EMR and report given to RN.

## 2025-02-27 NOTE — PROGRESS NOTES
CCPD Order   Exchanges: 4   Exchange Volume: 2500 ml   Total Time: 10 hrs   Dextrose: 2.5%   Last Fill: 0 ml   Total Volume: 47888 ml     Orders verified. Supplies taken to pt's room. Report received. Cycler set up, primed and pre tested. [facility]. Pt connected to cycler. CCPD initiated without problem. Initial effluent clear.       If problems should arise please call the [phone] number on top of PD cycler machine.

## 2025-02-27 NOTE — PROGRESS NOTES
Pt restrictions up. R groin site remains soft but started to ooze superficially after sitting up. Dressing changed and DSTAT in place. Site remains soft

## 2025-02-27 NOTE — PROGRESS NOTES
Informed that [facility] refused to accept inpatient inpatient transfer.  Will plan for PCI of SVG-OM by Dr. Deleon tomorrow.  Prednisone ordered for premedication of iodine allergy.

## 2025-02-27 NOTE — PROGRESS NOTES
Nutrition Note    RECOMMENDATIONS  PO Diet: Recommend 4 CCC/low K+/ 2 gram Na+ diet when resumed.  ONS: Offer Nepro daily   Nutrition Support: None     ASSESSMENT   LOS assessment. Pt NPO this morning for PCI of SVG-OM. Prior to this, pt with variable PO intake on 4 CCC/cardiac/2 gram Na+, low phos diet with PO intake ranging from 0-100% of meals, though consuming at least 50% of meal on average. Pt's weight with fluctuations as expected with hx heart failure and ESRD on PD but stable overall. Pt does have increased nutrient needs related to 5th digit amputation R foot on 2/17. Will add Nepro daily for additional nutrition once diet is resumed given increased nutrient needs and inconsistent PO intake.       Malnutrition Status: No malnutrition      NUTRITION DIAGNOSIS   Increased nutrient needs related to increase demand for energy/nutrients as evidenced by wounds  Inadequate protein-energy intake related to inadequate protein-energy intake as evidenced by variable po intake    Goals: PO intake 50% or greater, prior to discharge     NUTRITION RELATED FINDINGS  Objective: Na+ 131. K+ 5.5. Non-pitting LLE edema.  Wounds: Skin Tears, Surgical Incision    CURRENT NUTRITION THERAPIES  Diet NPO       PO Intake: 51-75% (average)   PO Supplement Intake:None Ordered    ANTHROPOMETRICS  Current Height: 172.7 cm (5' 7.99\")  Current Weight - Scale: 83.3 kg (183 lb 10.3 oz)    Admission weight:      COMPARATIVE STANDARDS  Total Energy Requirements (kcals/day): 2286     Protein (g):   grams       Fluid (mL/day):  5640-8541 mL    EDUCATION  Education/Counseling not indicated     The patient will be monitored per nutrition standards of care. Consult dietitian if additional nutrition interventions are needed prior to RD reassessment.     Adilia Parikh, MS, RD, LD    Contact: [phone]

## 2025-02-27 NOTE — PRE SEDATION
Brief Pre-Op Note/Sedation Assessment      Te FREITAS SHIRLEYEulogio  1965  [id number]  2:05 PM    Planned Procedure: Cardiac Catheterization Procedure  Post Procedure Plan: Return to same level of care  Consent: I have discussed with the patient and/or the patient representative the indication, alternatives, and the possible risks and/or complications of the planned procedure and the anesthesia methods. The patient and/or patient representative appear to understand and agree to proceed.        Chief Complaint:   Anginal Equivalent  Dyspnea on Exertion  Dyspnea  Fatigue      Indications for Cath Procedure:  Presentation:  Worsening Angina and Stable Known CAD  2.  Anginal Classification within 2 weeks:  CCS III - Symptoms with everyday living activities, i.e., moderate limitation  3.  Angina Symptoms Assessment:  Typical Chest Pain  4.  Heart Failure Class within last 2 weeks:  No symptoms  5.  Cardiovascular Instability:  No    Prior Ischemic Workup/Eval:  Pre-Procedural Medications: Yes: ACE/ARB/ARNI, Aspirin, Beta Blockers, Non-STATIN Cholesterol (Any), and STATIN  2.   Stress Test Completed?  Yes:  Stress or Imaging Studies Performed (within ANY time period):   Type:  Stress Nuclear  Results:  Positive:  Myocardial Perfusion Defects (Nuclear) Extent of Ischemia:  Intermediate    Does Patient need surgery?  Cath Valve Surgery:  No    Pre-Procedure Medical History:  Vital Signs:  /61   Pulse 95   Temp 98.4 °F (36.9 °C) (Temporal)   Resp 16   Ht 1.727 m (5' 7.99\")   Wt 83.3 kg (183 lb 10.3 oz)   SpO2 100%   BMI 27.93 kg/m²     Allergies:    Allergies   Allergen Reactions    Iodides Anaphylaxis    Shellfish-Derived Products Anaphylaxis    Morphine Hives and Itching    Lipitor [Atorvastatin] Rash     Rash on head and neck     Medications:    Current Facility-Administered Medications   Medication Dose Route Frequency Provider Last Rate Last Admin    sodium chloride flush 0.9 % injection 5-40 mL  5-40 mL

## 2025-02-27 NOTE — PROGRESS NOTES
[facility] Daily Progress Note      Admit Date:  2/18/2025    No chief complaint on file.       Subjective:  Mr. Garay resting comfortably in bed. Wife at bedside. Had an episode of chest pain overnight and received nitro, became hypotensive and confused. Reports he is ready to proceed with PCI. Has plans to follow up with TC after DC for additional discussion of brachytherapy.     Objective:   /61   Pulse 95   Temp 98.4 °F (36.9 °C) (Temporal)   Resp 16   Ht 1.727 m (5' 7.99\")   Wt 83.3 kg (183 lb 10.3 oz)   SpO2 100%   BMI 27.93 kg/m²     Intake/Output Summary (Last 24 hours) at 2/27/2025 1409  Last data filed at 2/27/2025 0819  Gross per 24 hour   Intake 0 ml   Output 650 ml   Net -650 ml       TELEMETRY: Sinus     Physical Exam:  General:  Awake, alert, oriented x 3, NAD  Skin:  Warm and dry  Neck:  JVD flat  Chest:  normal air entry  Cardiovascular:  RRR S1S2, no S3, no mrmr  Abdomen:  Soft, ND, NT, No HSM  Extremities:  No edema    Medications:    sodium chloride flush  5-40 mL IntraVENous 2 times per day    metoclopramide  5 mg Oral TID    isosorbide mononitrate  60 mg Oral Nightly    gentamicin   Topical Daily    aspirin  81 mg Oral Daily    levETIRAcetam  500 mg Oral Daily    vitamin D  50,000 Units Oral Weekly    carvedilol  12.5 mg Oral BID WC    sertraline  50 mg Oral Nightly    ranolazine  500 mg Oral BID    calcitRIOL  0.25 mcg Oral Daily    clopidogrel  75 mg Oral Daily    torsemide  100 mg Oral Daily    rosuvastatin  40 mg Oral Nightly    sacubitril-valsartan  1 tablet Oral BID    sodium chloride flush  5-40 mL IntraVENous 2 times per day    Insulin Pump - Basal Dose   SubCUTAneous Daily    Insulin Pump - Bolus Dose   SubCUTAneous 4x Daily AC & HS      sodium chloride      sodium chloride      dextrose       sodium chloride flush, sodium chloride, diphenhydrAMINE, prochlorperazine, traMADol, HYDROmorphone, nitroGLYCERIN, sodium chloride flush, sodium chloride,  complete.

## 2025-02-28 VITALS
SYSTOLIC BLOOD PRESSURE: 146 MMHG | DIASTOLIC BLOOD PRESSURE: 86 MMHG | OXYGEN SATURATION: 100 % | TEMPERATURE: 97.6 F | HEIGHT: 68 IN | HEART RATE: 88 BPM | RESPIRATION RATE: 16 BRPM | WEIGHT: 181 LBS | BODY MASS INDEX: 27.43 KG/M2

## 2025-02-28 LAB
ANION GAP SERPL CALCULATED.3IONS-SCNC: 14 MMOL/L (ref 3–16)
BUN SERPL-MCNC: 54 MG/DL (ref 7–20)
CALCIUM SERPL-MCNC: 9.6 MG/DL (ref 8.3–10.6)
CHLORIDE SERPL-SCNC: 93 MMOL/L (ref 99–110)
CO2 SERPL-SCNC: 26 MMOL/L (ref 21–32)
CREAT SERPL-MCNC: 9.8 MG/DL (ref 0.9–1.3)
DEPRECATED RDW RBC AUTO: 15.4 % (ref 12.4–15.4)
GFR SERPLBLD CREATININE-BSD FMLA CKD-EPI: 6 ML/MIN/{1.73_M2}
GLUCOSE BLD-MCNC: 129 MG/DL (ref 70–99)
GLUCOSE BLD-MCNC: 344 MG/DL (ref 70–99)
GLUCOSE SERPL-MCNC: 364 MG/DL (ref 70–99)
HCT VFR BLD AUTO: 33.8 % (ref 40.5–52.5)
HGB BLD-MCNC: 10.7 G/DL (ref 13.5–17.5)
MCH RBC QN AUTO: 29.1 PG (ref 26–34)
MCHC RBC AUTO-ENTMCNC: 31.8 G/DL (ref 31–36)
MCV RBC AUTO: 91.5 FL (ref 80–100)
PERFORMED ON: ABNORMAL
PERFORMED ON: ABNORMAL
PLATELET # BLD AUTO: 200 K/UL (ref 135–450)
PMV BLD AUTO: 9.7 FL (ref 5–10.5)
POTASSIUM SERPL-SCNC: 4.9 MMOL/L (ref 3.5–5.1)
RBC # BLD AUTO: 3.69 M/UL (ref 4.2–5.9)
SODIUM SERPL-SCNC: 133 MMOL/L (ref 136–145)
WBC # BLD AUTO: 15.3 K/UL (ref 4–11)

## 2025-02-28 PROCEDURE — 36415 COLL VENOUS BLD VENIPUNCTURE: CPT

## 2025-02-28 PROCEDURE — 6370000000 HC RX 637 (ALT 250 FOR IP): Performed by: STUDENT IN AN ORGANIZED HEALTH CARE EDUCATION/TRAINING PROGRAM

## 2025-02-28 PROCEDURE — 85027 COMPLETE CBC AUTOMATED: CPT

## 2025-02-28 PROCEDURE — 6370000000 HC RX 637 (ALT 250 FOR IP): Performed by: INTERNAL MEDICINE

## 2025-02-28 PROCEDURE — 80048 BASIC METABOLIC PNL TOTAL CA: CPT

## 2025-02-28 PROCEDURE — 99233 SBSQ HOSP IP/OBS HIGH 50: CPT | Performed by: NURSE PRACTITIONER

## 2025-02-28 PROCEDURE — 2500000003 HC RX 250 WO HCPCS: Performed by: INTERNAL MEDICINE

## 2025-02-28 RX ORDER — ISOSORBIDE MONONITRATE 60 MG/1
60 TABLET, EXTENDED RELEASE ORAL NIGHTLY
Qty: 30 TABLET | Refills: 0 | Status: SHIPPED | OUTPATIENT
Start: 2025-02-28

## 2025-02-28 RX ADMIN — CARVEDILOL 12.5 MG: 6.25 TABLET, FILM COATED ORAL at 09:21

## 2025-02-28 RX ADMIN — RANOLAZINE 500 MG: 500 TABLET, EXTENDED RELEASE ORAL at 09:22

## 2025-02-28 RX ADMIN — METOCLOPRAMIDE 5 MG: 10 TABLET ORAL at 09:21

## 2025-02-28 RX ADMIN — CALCITRIOL CAPSULES 0.25 MCG 0.25 MCG: 0.25 CAPSULE ORAL at 09:22

## 2025-02-28 RX ADMIN — SACUBITRIL AND VALSARTAN 1 TABLET: 49; 51 TABLET, FILM COATED ORAL at 09:21

## 2025-02-28 RX ADMIN — ASPIRIN 81 MG: 81 TABLET, COATED ORAL at 09:21

## 2025-02-28 RX ADMIN — TORSEMIDE 100 MG: 100 TABLET ORAL at 09:22

## 2025-02-28 RX ADMIN — CLOPIDOGREL BISULFATE 75 MG: 75 TABLET ORAL at 09:22

## 2025-02-28 RX ADMIN — LEVETIRACETAM 500 MG: 500 TABLET, FILM COATED ORAL at 09:22

## 2025-02-28 RX ADMIN — SODIUM CHLORIDE, PRESERVATIVE FREE 10 ML: 5 INJECTION INTRAVENOUS at 09:22

## 2025-02-28 NOTE — PROGRESS NOTES
[facility]   Nephrology progress Note  [phone]  [phone]   [email or web address]         Reason for Consult:  ESRD    HISTORY OF PRESENT ILLNESS:      Te Garay is a 59 y.o. male with a PMHx of ESRD on PD, CAD/CABG/CHF, carotid artery stenosis, DM 2, diabetic neuropathy, hypertension, hyperlipidemia, seizure disorder.   He had fifth digit amputation of right foot on 2/17, reports receiving 3L of fluid on that day, then was admitted on 2/18 for planned elective LHC that was completed.   LHC showed SVG-OM2: 70% proximal, in-stent, 99% mid, in-stent. LVEDP 36mmHg.   Reports that his prescription is 10hrs, 4 cycles, 11L total, 2.2L with 2L extraneal.   He has residual function, urinates ~1L /day .  We are following for PD management.     During Hospitalization, cardiology were discussing options for revascularization of SVG-OM 2. Initial plan to discharge and arrangements were made to follow up with [facility] Dr. Camara for brachytherapy and complex PCI/. Patient thought continued to have chest pain, transfer to [facility] was denied, and ultimately had LHC on 2/27 by Dr. Deleon with PCI of SVG to OM, with plan for follow up with [facility] after DC for additional discussion of brachytherapy.     Interval history:   Dialyzed with 2.5% dextrose last night.  Reports feeling much better today after the LHC/PCI yesterday and the improvement in volume overload overall with more UF with PD..   No issued with PD reported.       PHYSICAL EXAM:    Vitals:    BP (!) 146/86   Pulse 88   Temp 97.6 °F (36.4 °C) (Temporal)   Resp 16   Ht 1.727 m (5' 7.99\")   Wt 82.1 kg (181 lb)   SpO2 100%   BMI 27.53 kg/m²   I/O last 3 completed shifts:  In: 240 [P.O.:240]  Out: 662 [Urine:650; Blood:12]  I/O this shift:  In: -   Out: 900 [Urine:900]    Physical Exam:  Gen:  alert, oriented x 3  CV: RRR no murmur or rub .  Lungs:B/ L air entry, Normal breath sounds   Abd: soft, bowel sounds  Ext: No edema, no  GLUCOSEU >=1000 05/25/2010 07:19 PM    AMORPHOUS 2+ 12/07/2021 05:08 AM     Microalbumen/Creatinine ratio:  No components found for: \"RUCREAT\"  IRON:    Lab Results   Component Value Date/Time    IRON 34 01/10/2025 08:18 AM     Iron Saturation:  No components found for: \"PERCENTFE\"  TIBC:    Lab Results   Component Value Date/Time    TIBC 241 01/10/2025 08:18 AM     FERRITIN:    Lab Results   Component Value Date/Time    FERRITIN 54.3 01/10/2025 08:18 AM         IMPRESSION/RECOMMENDATIONS:      ESRD on CCPD   - CCPD -  [facility], Primary nephrologist Dr. Turner  - Continue with current prescription without a daytime exchange: 10 hours, 10L , 4 exchanges , 2.5L   - Using 2.5% Dianeal solutions.  - continue with Torsemide, continues to have a fair residual kidney function   - Avoid nephrotoxins as best possible   - outpatient clearance test below goal. Dr. Turner discussed switching to hemo, preferably home hemo. Patient is willing to consider this but wants to think about this. Will follow up in clinic with Dr Turner in 2 weeks       Hyperkalemia   Likely exacerbated by hyperglycemia  Received Lokelma yesterday   K in good range today at 4.9        Hyponatremia   Likely related to volume overload.   Modulate  UF with PD.      Fluid overload  Improved with using 4.25% Dianeal solutions.   Currently using 2.5% Dianeal solutions with good Ultrafiltration        Unstable angina   S/p LHC 2/18 showed SVG-OM2: 70% proximal, in-stent, 99% mid, in-stent.   Cardiology team following , discussing options for revascularization of SVG-OM 2.   S/p LHC 2/27 with complex angioplasty and stenting of SVG to OM .        HTN  Continue with  current regimen        Anemia of chronic Kidney disease  Target Hb 9-11   Hb at goal, No need of ESAs for now as in-pt     CKD - MBD  Monitor Calcium and Phosphorus   Continue calcitriol  Low phosphorus diet      Type 1 DM  Continue insulin pump     PVD   S/p fifth digit  amputation

## 2025-02-28 NOTE — PLAN OF CARE
Problem: Chronic Conditions and Co-morbidities  Goal: Patient's chronic conditions and co-morbidity symptoms are monitored and maintained or improved  2/28/2025 0022 by Felicita Henderson RN  Outcome: Progressing  2/27/2025 1826 by Laurel Montes RN  Outcome: Progressing     Problem: Discharge Planning  Goal: Discharge to home or other facility with appropriate resources  2/28/2025 0022 by Felicita Henderson RN  Outcome: Progressing  2/27/2025 1826 by Laurel Montes RN  Outcome: Progressing     Problem: Pain  Goal: Verbalizes/displays adequate comfort level or baseline comfort level  2/28/2025 0022 by Felicita Henderson RN  Outcome: Progressing  2/27/2025 1826 by Laurel Montes RN  Outcome: Progressing     Problem: Safety - Adult  Goal: Free from fall injury  2/28/2025 0022 by Felicita Henderson RN  Outcome: Progressing  2/27/2025 1826 by Laurel Montes RN  Outcome: Progressing     Problem: ABCDS Injury Assessment  Goal: Absence of physical injury  2/28/2025 0022 by Felicita Henderson RN  Outcome: Progressing  2/27/2025 1826 by Laurel Montes RN  Outcome: Progressing     Problem: Cardiovascular - Adult  Goal: Maintains optimal cardiac output and hemodynamic stability  Outcome: Progressing  Goal: Absence of cardiac dysrhythmias or at baseline  Outcome: Progressing     Problem: Skin/Tissue Integrity - Adult  Goal: Skin integrity remains intact  Outcome: Progressing  Goal: Incisions, wounds, or drain sites healing without S/S of infection  Outcome: Progressing  Goal: Oral mucous membranes remain intact  Outcome: Progressing     Problem: Musculoskeletal - Adult  Goal: Return mobility to safest level of function  Outcome: Progressing  Goal: Maintain proper alignment of affected body part  Outcome: Progressing  Goal: Return ADL status to a safe level of function  Outcome: Progressing     Problem: Infection - Adult  Goal: Absence of infection at discharge  Outcome: Progressing  Goal: Absence of infection during  status  Outcome: Progressing

## 2025-02-28 NOTE — PROGRESS NOTES
Data- discharge order received, pt verbalized agreement to discharge, needs for Home Health Care with [facility]  for pt/ot and nursing, CLOVER reviewed and signed by MD, to be completed by RN.    Action- AVS prepared, discharge instructions prepared and given to patient, medication information packet given r/t NEW or CHANGED prescriptions, pt verbalized understanding further self-review.   D/C instruction summary: Diet- general, Activity- up as tolerated , follow up with Primary Care Physician Reid Lei -[phone] appointment in a week, immunizations reviewed, medications prescriptions to be filled pharmacy of choice. Inpatient surgical procedural reviewed: left heart catheterization. Contact information provided to above agencies used.    Response- Case Management/ reported faxing completed CLOVER and AVS to needed HHC/DME services stated above.   Pt belongings gathered, IV removed, pt dressed. Disposition is home with HHC/DME as stated above, transported with family, taken to lobby via w/c with staff, no complications.     1. WEIGHT: Admit Weight - Scale: 70.8 kg (156 lb) (02/18/25 0918)        Today  Weight - Scale: 82.1 kg (181 lb) (02/28/25 1100)       2. O2 SAT.: SpO2: 100 % (02/28/25 1224)

## 2025-02-28 NOTE — CARE COORDINATION
02/28/25 1414   IMM Letter   IMM Letter given to Patient/Family/Significant other/Guardian/POA/by: Second IMM given to patient by CM   IMM Letter date given: 02/28/25   IMM Letter time given: 1411  (Patient in agreement with not having the four hours notice)

## 2025-02-28 NOTE — PROGRESS NOTES
[facility]   Cardiology Progress Note     Date: 2/28/2025  Admit Date: 2/18/2025     Reason for consultation:     No chief complaint on file.    History of Present Illness: History obtained from patient and medical record.     Te Garay is a 59 y.o. male admitted after cardiac catheterization. He had just had a procedure 2/17/25 for toe amputation and was given 3 liters of fluid, he came in the following day for an outpatient cath for ongoing chest discomfort. After the procedure, he was feeling fluid overloaded and in a lot of pain. Nephrology is on board to assist with PD. (per consult note)    Interval Hx: Today, he is feeling so much better. Lots of energy. Eager to make changes to his diet and exercise for his long term benefit.  Tele stable. Denies further chest pain. Tele stable.  Right  groin procedure site c/d/I. No hematoma or bruising. Good pulses, color, warmth, and sensation to that extremity.       Patient seen and examined. Clinical notes reviewed. Telemetry reviewed.  No new complaints today. No major events overnight.   Denies having chest pain, palpitations, shortness of breath, orthopnea/PND, cough, or dizziness at the time of this visit.      Past Medical History:  Past Medical History:   Diagnosis Date    Cardiomyopathy ([facility])     Carotid artery stenosis 10/25/2016    CHF (congestive heart failure) ([facility])     Coronary artery disease     De Quervain thyroiditis 06/08/2023    ESRD (end stage renal disease) ([facility])     PD nightly x 10 hours    HFrEF (heart failure with reduced ejection fraction) ([facility])     Hyperlipidemia 05/26/2010    Hyperparathyroidism (secondary) 04/25/2024    Hypertension 01/12/2019    KATIE (obstructive sleep apnea)     Overweight     Peripheral neuropathy     Poorly controlled type 1 diabetes mellitus ([facility])     PVD (peripheral vascular disease)     Seizure disorder ([facility])     TIA (transient ischemic attack) 2024        Past Surgical History:    has a past surgical

## 2025-02-28 NOTE — PROGRESS NOTES
Treatment time: 11 Hours 24 minutes  Net UF: 332 ml  Dwell Time: 00:51 minutes Gained    Treatment completed without complications or complaints from patient. Effluent clear yellow and lines taped to patient per protocol. Patient resting comfortably with VSS upon exiting room.      Copy of dialysis treatment record placed in chart, to be scanned into EMR and report given to Yoli Lopez RN.

## 2025-02-28 NOTE — PLAN OF CARE
Problem: Chronic Conditions and Co-morbidities  Goal: Patient's chronic conditions and co-morbidity symptoms are monitored and maintained or improved  2/28/2025 1018 by Yoli Teran RN  Outcome: Progressing     Problem: Discharge Planning  Goal: Discharge to home or other facility with appropriate resources  2/28/2025 1018 by Yoli Teran RN  Outcome: Progressing     Problem: Pain  Goal: Verbalizes/displays adequate comfort level or baseline comfort level  2/28/2025 1018 by Yoli Teran RN  Outcome: Progressing

## 2025-02-28 NOTE — PROGRESS NOTES
CLINICAL PHARMACY NOTE: MEDS TO BEDS    Total # of Prescriptions Filled: 1   The following medications were delivered to the patient:  Isosorbide mononitrate ER 60mg    Additional Documentation:     Medication was picked up in the Outpatient Pharmacy by patient    Nidia Lindsey CPhT

## 2025-02-28 NOTE — DIALYSIS
Treatment initiated without complications or complaints from patient. Patient resting comfortably with VSS upon dialysis RN exit from room. Felicita Henderson RN notified of start of treatment and hotline number located on top of machine for any questions about troubleshooting during after hours.

## 2025-02-28 NOTE — PROGRESS NOTES
At shift change bedside report this nurse noticed the R groin site gauze was saturated. Changed dressing and applied a D Stat dressing. No further oozing or bleeding has been noted since then. The site is soft all around and no signs of hematoma.

## 2025-02-28 NOTE — CARE COORDINATION
Case Management -  Discharge Note      Patient Name: Te Garay                   YOB: 1965  Room: X8N-0031/[facility]            Readmission Risk (Low < 19, Mod (19-27), High > 27): Readmission Risk Score: 36.2    Current PCP: Reid Lei MD      ([facility]) Important Message from Medicare:    Has pt received appropriate compliance notices before being discharged if required: yes  Compliance doc:  [x] 2nd IMM; [] Code 44 [] Dodd  Date Given: 2/28/25 Given By: SMOOTH    PT AM-PAC:   /24  OT AM-PAC:   /24    Patient/patient representative has been educated on the benefits of HHC as well as the possible risks of declining recommended services. Patient/patient representative has acknowledged the information provided and decided on the following discharge plan. Patient/ patient representative has been provided freedom of choice regarding service provider, supported by basic dialogue that supports the patient's individualized plan of care/goals.    Home Care Agency:   [facility] [address],   [place] [address]  Phone: [phone]  Fax: [phone]            Services: Skilled Nursing, PT & OT   Home health agency notified of discharge.  Yes- Cindy in intake.     C agency notified of discharge:  [x] Yes   [] No               [] NA  Family notified of discharge:  [x] Yes             [] No               [] NA  Facility notified of discharge:  [] Yes             [] No               [x] NA     Pt is being discharged with Outpt IV Antibiotics  [] Yes       [x] No               [] NA  If yes, make sure CLOVER is faxed to C agency, and meds are called in to pharmacy by RN from CLOVER orders only.       Home Peritoneal Dialysis   with  [facility]  [address] , [place] [address]  Phone:  [phone]  Fax: [phone]    Financial    Payor: MEDICARE / Plan: MEDICARE PART A AND B / Product Type: *No Product type* /     Pharmacy:  Potential assistance Purchasing  Medications: No  Meds-to-Beds request: Yes      [id number] - [place], [place] - [address] - P [phone] - F [phone]  [address] [place] [address]  Phone: [phone] Fax: [phone]    [facility] - [place], [place] - [address] [place] [address] - P [phone] - F [phone]  [address] [place] [address]  Phone: [phone] Fax: [phone]      Notes:    Additional Case Management Notes:   Family member will transport patient home.    Electronically signed by OCHOA MENG RN /BS/CCM on 2/28/2025 at 2:23 PM

## 2025-03-14 ENCOUNTER — OFFICE VISIT (OUTPATIENT)
Dept: CARDIOLOGY CLINIC | Age: 60
End: 2025-03-14
Payer: MEDICARE

## 2025-03-14 VITALS
SYSTOLIC BLOOD PRESSURE: 109 MMHG | HEIGHT: 67 IN | DIASTOLIC BLOOD PRESSURE: 73 MMHG | HEART RATE: 84 BPM | BODY MASS INDEX: 28.72 KG/M2 | WEIGHT: 183 LBS

## 2025-03-14 DIAGNOSIS — I25.718 CORONARY ARTERY DISEASE OF AUTOLOGOUS VEIN BYPASS GRAFT WITH STABLE ANGINA PECTORIS: Primary | ICD-10-CM

## 2025-03-14 DIAGNOSIS — E78.2 MIXED HYPERLIPIDEMIA: ICD-10-CM

## 2025-03-14 DIAGNOSIS — I25.5 ISCHEMIC CARDIOMYOPATHY: ICD-10-CM

## 2025-03-14 DIAGNOSIS — I50.22 CHRONIC SYSTOLIC HEART FAILURE (HCC): ICD-10-CM

## 2025-03-14 PROCEDURE — 3074F SYST BP LT 130 MM HG: CPT | Performed by: NURSE PRACTITIONER

## 2025-03-14 PROCEDURE — G8427 DOCREV CUR MEDS BY ELIG CLIN: HCPCS | Performed by: NURSE PRACTITIONER

## 2025-03-14 PROCEDURE — 99214 OFFICE O/P EST MOD 30 MIN: CPT | Performed by: NURSE PRACTITIONER

## 2025-03-14 PROCEDURE — 3078F DIAST BP <80 MM HG: CPT | Performed by: NURSE PRACTITIONER

## 2025-03-14 PROCEDURE — 1111F DSCHRG MED/CURRENT MED MERGE: CPT | Performed by: NURSE PRACTITIONER

## 2025-03-14 PROCEDURE — 1036F TOBACCO NON-USER: CPT | Performed by: NURSE PRACTITIONER

## 2025-03-14 PROCEDURE — 3017F COLORECTAL CA SCREEN DOC REV: CPT | Performed by: NURSE PRACTITIONER

## 2025-03-14 PROCEDURE — G8417 CALC BMI ABV UP PARAM F/U: HCPCS | Performed by: NURSE PRACTITIONER

## 2025-03-14 NOTE — PROGRESS NOTES
[facility]     Outpatient Follow Up Note    CHIEF COMPLAINT / HPI: Hospital Follow Up secondary to status post coronary angioplasty    Hospital record has been reviewed  Hospital Course progressed as follows per discharge summary:     Te Garay is a 59 y.o. male with a past medical history of hypertension, hyperlipidemia, HFrEF, KATIE, PVD, CAD/CABG   ~ presented to hospital for [facility], found to have in-stent stenosis SVG-OM2.   ~Admitted with unstable angina.     S/p [facility] 2/18 revealed 99% in-stent stenosis SVG-OM2.  He was placed on hepatin gtt and treated medically for CP.  He needs OP referral to [facility] for PCI and cardiology has been arranging prior to discharge vs planning for transfer.      2/24 cardiology cleared patient for discharge and planning for OP PCI.  Continues to manage his insulin pump appropriately and will continue at discharge.      He had persistent anginal CP and underwent PCI of PCI 2/27/25 s/p PCI of SVG-OM with MARIIA  2/27/2025 with Dr. Deleon.  He has not had any further CP.  No SOB and right foot is stable.  Cardiology OK with discharge, follow up on 3/3 at [facility]. Imdur was increased.  Amlodipine added and BP did not tolerate, it was stopped.      CAD  Chest Pain  - Hx CABG (2021), PCI of SVG-OM2 (4/2021)  - [facility] 2/18 revealed 99% in-stent stenosis SVG-OM2  - Cardiology has reached out to [facility] to arrange consultation for brachytherapy and complex PCI, either as inpatient or outpatient basis pending clinical course. Awaiting final recs.   - Continue asa 81 mg, Plavix 75 mg, carvedilol 12.5 mg, rosuvastatin 40 mg, Ranexa 500 mg, Entresto 49/51 mg  - underwent PCI of PCI 2/27/25 s/p PCI of SVG-OM with MARIIA  2/27/2025 with Dr. Deleon.   -  Cardiology following, OK with discharge today, no further CP, s/p PCI 2/27 He has OP appt 3/3 scheduled at [facility].       Interval hx:   3/3/25:Pt was scheduled today 3/3/25 w/ Dr. Edwin SEGOVIA for a Brachytherapy Consult. Pt No showed.   3/5/25:   PT called

## 2025-03-19 ENCOUNTER — TELEPHONE (OUTPATIENT)
Dept: CARDIOLOGY CLINIC | Age: 60
End: 2025-03-19

## 2025-03-19 NOTE — TELEPHONE ENCOUNTER
Pt feels like he went overboard on the fluid after getting out of the hospital. Pt states his weight is down to 171 and bp is 84/52. Pt states he felt like he was going to pass out yesterday but fine on that today. Pt asking for a call to go over his medication he is currently taking to help him.

## 2025-03-20 NOTE — TELEPHONE ENCOUNTER
Please reach out to see if you can get in touch with  SHIRLEYSmithstephanie on the message below.

## 2025-04-06 ENCOUNTER — APPOINTMENT (OUTPATIENT)
Dept: GENERAL RADIOLOGY | Age: 60
End: 2025-04-06
Payer: MEDICARE

## 2025-04-06 ENCOUNTER — HOSPITAL ENCOUNTER (EMERGENCY)
Age: 60
End: 2025-04-06
Attending: STUDENT IN AN ORGANIZED HEALTH CARE EDUCATION/TRAINING PROGRAM
Payer: MEDICARE

## 2025-04-06 VITALS
OXYGEN SATURATION: 100 % | BODY MASS INDEX: 28.66 KG/M2 | SYSTOLIC BLOOD PRESSURE: 166 MMHG | WEIGHT: 183 LBS | RESPIRATION RATE: 12 BRPM | DIASTOLIC BLOOD PRESSURE: 94 MMHG | HEART RATE: 85 BPM

## 2025-04-06 DIAGNOSIS — I25.83 CORONARY ATHEROSCLEROSIS DUE TO LIPID RICH PLAQUE: ICD-10-CM

## 2025-04-06 DIAGNOSIS — I46.9 CARDIAC ARREST: Primary | ICD-10-CM

## 2025-04-06 LAB
ALBUMIN SERPL-MCNC: 2.3 G/DL (ref 3.4–5)
ALBUMIN/GLOB SERPL: 0.9 {RATIO} (ref 1.1–2.2)
ALP SERPL-CCNC: 102 U/L (ref 40–129)
ALT SERPL-CCNC: <5 U/L (ref 10–40)
ANION GAP SERPL CALCULATED.3IONS-SCNC: 15 MMOL/L (ref 3–16)
AST SERPL-CCNC: 13 U/L (ref 15–37)
BASOPHILS # BLD: 0 K/UL (ref 0–0.2)
BASOPHILS NFR BLD: 0.6 %
BILIRUB SERPL-MCNC: <0.2 MG/DL (ref 0–1)
BUN SERPL-MCNC: 35 MG/DL (ref 7–20)
CALCIUM SERPL-MCNC: 6.2 MG/DL (ref 8.3–10.6)
CHLORIDE SERPL-SCNC: 107 MMOL/L (ref 99–110)
CO2 SERPL-SCNC: 15 MMOL/L (ref 21–32)
CREAT SERPL-MCNC: 6.9 MG/DL (ref 0.9–1.3)
DEPRECATED RDW RBC AUTO: 15.3 % (ref 12.4–15.4)
EOSINOPHIL # BLD: 0.1 K/UL (ref 0–0.6)
EOSINOPHIL NFR BLD: 2.8 %
GFR SERPLBLD CREATININE-BSD FMLA CKD-EPI: 9 ML/MIN/{1.73_M2}
GLUCOSE BLD-MCNC: 121 MG/DL (ref 70–99)
GLUCOSE SERPL-MCNC: 102 MG/DL (ref 70–99)
HCT VFR BLD AUTO: 27.4 % (ref 40.5–52.5)
HGB BLD-MCNC: 8.8 G/DL (ref 13.5–17.5)
LYMPHOCYTES # BLD: 1.1 K/UL (ref 1–5.1)
LYMPHOCYTES NFR BLD: 23.7 %
MAGNESIUM SERPL-MCNC: 1.27 MG/DL (ref 1.8–2.4)
MCH RBC QN AUTO: 29.1 PG (ref 26–34)
MCHC RBC AUTO-ENTMCNC: 32.1 G/DL (ref 31–36)
MCV RBC AUTO: 90.6 FL (ref 80–100)
MONOCYTES # BLD: 0.7 K/UL (ref 0–1.3)
MONOCYTES NFR BLD: 16 %
NEUTROPHILS # BLD: 2.6 K/UL (ref 1.7–7.7)
NEUTROPHILS NFR BLD: 56.9 %
NT-PROBNP SERPL-MCNC: 6889 PG/ML (ref 0–124)
PERFORMED ON: ABNORMAL
PLATELET # BLD AUTO: 168 K/UL (ref 135–450)
PMV BLD AUTO: 9.2 FL (ref 5–10.5)
POTASSIUM SERPL-SCNC: 2.5 MMOL/L (ref 3.5–5.1)
PROT SERPL-MCNC: 4.9 G/DL (ref 6.4–8.2)
RBC # BLD AUTO: 3.03 M/UL (ref 4.2–5.9)
SODIUM SERPL-SCNC: 137 MMOL/L (ref 136–145)
TROPONIN, HIGH SENSITIVITY: 123 NG/L (ref 0–22)
WBC # BLD AUTO: 4.5 K/UL (ref 4–11)

## 2025-04-06 PROCEDURE — 71045 X-RAY EXAM CHEST 1 VIEW: CPT

## 2025-04-06 PROCEDURE — 96375 TX/PRO/DX INJ NEW DRUG ADDON: CPT

## 2025-04-06 PROCEDURE — 85025 COMPLETE CBC W/AUTO DIFF WBC: CPT

## 2025-04-06 PROCEDURE — 83735 ASSAY OF MAGNESIUM: CPT

## 2025-04-06 PROCEDURE — 93005 ELECTROCARDIOGRAM TRACING: CPT | Performed by: STUDENT IN AN ORGANIZED HEALTH CARE EDUCATION/TRAINING PROGRAM

## 2025-04-06 PROCEDURE — 99285 EMERGENCY DEPT VISIT HI MDM: CPT

## 2025-04-06 PROCEDURE — 84484 ASSAY OF TROPONIN QUANT: CPT

## 2025-04-06 PROCEDURE — 80053 COMPREHEN METABOLIC PANEL: CPT

## 2025-04-06 PROCEDURE — 6360000002 HC RX W HCPCS: Performed by: STUDENT IN AN ORGANIZED HEALTH CARE EDUCATION/TRAINING PROGRAM

## 2025-04-06 PROCEDURE — 96374 THER/PROPH/DIAG INJ IV PUSH: CPT

## 2025-04-06 PROCEDURE — 92950 HEART/LUNG RESUSCITATION CPR: CPT

## 2025-04-06 PROCEDURE — 83880 ASSAY OF NATRIURETIC PEPTIDE: CPT

## 2025-04-06 RX ORDER — ATROPINE SULFATE 0.1 MG/ML
INJECTION INTRAVENOUS
Status: DISCONTINUED
Start: 2025-04-06 | End: 2025-04-06 | Stop reason: HOSPADM

## 2025-04-06 RX ORDER — FENTANYL CITRATE 50 UG/ML
50 INJECTION, SOLUTION INTRAMUSCULAR; INTRAVENOUS ONCE
Refills: 0 | Status: COMPLETED | OUTPATIENT
Start: 2025-04-06 | End: 2025-04-06

## 2025-04-06 RX ORDER — FENTANYL CITRATE 50 UG/ML
50 INJECTION, SOLUTION INTRAMUSCULAR; INTRAVENOUS ONCE
Status: COMPLETED | OUTPATIENT
Start: 2025-04-06 | End: 2025-04-06

## 2025-04-06 RX ORDER — ETOMIDATE 2 MG/ML
INJECTION INTRAVENOUS
Status: DISCONTINUED
Start: 2025-04-06 | End: 2025-04-06 | Stop reason: HOSPADM

## 2025-04-06 RX ORDER — FENTANYL CITRATE 50 UG/ML
75 INJECTION, SOLUTION INTRAMUSCULAR; INTRAVENOUS
Status: DISCONTINUED | OUTPATIENT
Start: 2025-04-06 | End: 2025-04-06 | Stop reason: HOSPADM

## 2025-04-06 RX ORDER — ONDANSETRON 2 MG/ML
4 INJECTION INTRAMUSCULAR; INTRAVENOUS ONCE
Status: COMPLETED | OUTPATIENT
Start: 2025-04-06 | End: 2025-04-06

## 2025-04-06 RX ORDER — ROCURONIUM BROMIDE 10 MG/ML
INJECTION, SOLUTION INTRAVENOUS
Status: DISCONTINUED
Start: 2025-04-06 | End: 2025-04-06 | Stop reason: WASHOUT

## 2025-04-06 RX ORDER — LORAZEPAM 2 MG/ML
INJECTION INTRAMUSCULAR
Status: DISCONTINUED
Start: 2025-04-06 | End: 2025-04-06 | Stop reason: WASHOUT

## 2025-04-06 RX ORDER — AMIODARONE HYDROCHLORIDE 150 MG/3ML
INJECTION, SOLUTION INTRAVENOUS
Status: DISCONTINUED
Start: 2025-04-06 | End: 2025-04-06 | Stop reason: HOSPADM

## 2025-04-06 RX ORDER — POTASSIUM CHLORIDE 7.45 MG/ML
INJECTION INTRAVENOUS
Status: DISCONTINUED
Start: 2025-04-06 | End: 2025-04-06 | Stop reason: HOSPADM

## 2025-04-06 RX ADMIN — FENTANYL CITRATE 50 MCG: 50 INJECTION INTRAMUSCULAR; INTRAVENOUS at 16:58

## 2025-04-06 RX ADMIN — FENTANYL CITRATE 50 MCG: 50 INJECTION INTRAMUSCULAR; INTRAVENOUS at 16:43

## 2025-04-06 RX ADMIN — ONDANSETRON 4 MG: 2 INJECTION, SOLUTION INTRAMUSCULAR; INTRAVENOUS at 16:57

## 2025-04-06 ASSESSMENT — PAIN SCALES - GENERAL
PAINLEVEL_OUTOF10: 10

## 2025-04-06 ASSESSMENT — PAIN - FUNCTIONAL ASSESSMENT
PAIN_FUNCTIONAL_ASSESSMENT: 0-10
PAIN_FUNCTIONAL_ASSESSMENT: 0-10

## 2025-04-06 NOTE — ED NOTES
Pulse check at this time. PEA still noted on monitor,  Compressions resumed.   Epi increased to 7mcg/min

## 2025-04-06 NOTE — ED PROVIDER NOTES
none          CRITICAL CARE TIME   Total Critical Care time was 90 minutes, excluding separately reportable procedures.  There was a high probability of clinically significant/life threatening deterioration in the patient's condition which required my urgent intervention.        PROCEDURES:  Unless otherwise noted below, none     Procedures    FINAL IMPRESSION      1. Cardiac arrest    2. Coronary atherosclerosis due to lipid rich plaque          DISPOSITION/PLAN   DISPOSITION  2025 05:59:00 PM      PATIENT REFERRED TO:  No follow-up provider specified.    DISCHARGE MEDICATIONS:      New Prescriptions    No medications on file       Controlled Substances Monitoring:    If the patient was prescribed a controlled substance today, the PDMP was reviewed as documented below.         No data to display                (Please note that portions of this note were completed with a voice recognition program.  Efforts were made to edit the dictations but occasionally words are mis-transcribed.)    Leonard Candelario MD (electronically signed)  Attending Emergency Physician            Leonard Candelario MD  25       Leonard Candelario MD  25

## 2025-04-06 NOTE — ED NOTES
300 amiodarone bolus given per verbal order by Dr. Candelario.   V-tach still noted on the monitor.   Pt cardioverted as well.

## 2025-04-06 NOTE — ED NOTES
Upon repeat of second EKG, pt noted to have a seizure, and was noted to have v-tach on monitor.   Pads immediately placed on chest, and pt was shocked.   Pt was noted to have agonal rhythm.   CPR started.

## 2025-04-06 NOTE — ED NOTES
Mag given at this time per verbal order by Dr. Candelario.    Upper Respiratory Infection    An upper respiratory infection (URI) is a viral infection of the air passages leading to the lungs. It is the most common type of infection. A URI affects the nose, throat, and upper air passages. The most common type of URI is the common cold.    URIs run their course and will usually resolve on their own. Most of the time a URI does not require medical attention. URIs in children may last longer than they do in adults.     CAUSES  A URI is caused by a virus. A virus is a type of germ that is spread from one person to another.     SIGNS AND SYMPTOMS  A URI usually involves the following symptoms:    Runny nose.    Stuffy nose.    Sneezing.    Cough.    Low-grade fever.    Poor appetite.    Difficulty sucking while feeding because of a plugged-up nose.    Fussy behavior.    Rattle in the chest (due to air moving by mucus in the air passages).    Decreased activity.    Decreased sleep.    Vomiting.  Diarrhea.    DIAGNOSIS  To diagnose a URI, your infant's health care provider will take your infant's history and perform a physical exam. A nasal swab may be taken to identify specific viruses.     TREATMENT  A URI goes away on its own with time. It cannot be cured with medicines, but medicines may be prescribed or recommended to relieve symptoms. Medicines that are sometimes taken during a URI include:     Cough suppressants. Coughing is one of the body's defenses against infection. It helps to clear mucus and debris from the respiratory system. Cough suppressants should usually not be given to infants with UTIs.    Fever-reducing medicines. Fever is another of the body's defenses. It is also an important sign of infection. Fever-reducing medicines are usually only recommended if your infant is uncomfortable.     HOME CARE INSTRUCTIONS  Give medicines only as directed by your infant's health care provider. Do not give your infant aspirin or products containing aspirin because of the association with Reye's syndrome. Also, do not give your infant over-the-counter cold medicines. These do not speed up recovery and can have serious side effects.  Talk to your infant's health care provider before giving your infant new medicines or home remedies or before using any alternative or herbal treatments.   Use saline nose drops often to keep the nose open from secretions. It is important for your infant to have clear nostrils so that he or she is able to breathe while sucking with a closed mouth during feedings.    Over-the-counter saline nasal drops can be used. Do not use nose drops that contain medicines unless directed by a health care provider.    Fresh saline nasal drops can be made daily by adding ¼ teaspoon of table salt in a cup of warm water.    If you are using a bulb syringe to suction mucus out of the nose, put 1 or 2 drops of the saline into 1 nostril. Leave them for 1 minute and then suction the nose. Then do the same on the other side.    Keep your infant's mucus loose by:    Offering your infant electrolyte-containing fluids, such as an oral rehydration solution, if your infant is old enough.    Using a cool-mist vaporizer or humidifier. If one of these are used, clean them every day to prevent bacteria or mold from growing in them.    If needed, clean your infant's nose gently with a moist, soft cloth. Before cleaning, put a few drops of saline solution around the nose to wet the areas.    Your infant's appetite may be decreased. This is okay as long as your infant is getting sufficient fluids.  URIs can be passed from person to person (they are contagious). To keep your infant's URI from spreading:   Wash your hands before and after you handle your baby to prevent the spread of infection.   Wash your hands frequently or use alcohol-based antiviral gels.  Do not touch your hands to your mouth, face, eyes, or nose. Encourage others to do the same.    SEEK MEDICAL CARE IF:  Your infant's symptoms last longer than 10 days.    Your infant has a hard time drinking or eating.    Your infant's appetite is decreased.    Your infant wakes at night crying.    Your infant pulls at his or her ear(s).    Your infant's fussiness is not soothed with cuddling or eating.    Your infant has ear or eye drainage.    Your infant shows signs of a sore throat.    Your infant is not acting like himself or herself.  Your infant's cough causes vomiting.   Your infant is younger than 1 month old and has a cough.  Your infant has a fever.    SEEK IMMEDIATE MEDICAL CARE IF:  Your infant who is younger than 3 months has a fever of 100°F (38°C) or higher.   Your infant is short of breath. Look for:    Rapid breathing.    Grunting.    Sucking of the spaces between and under the ribs.    Your infant makes a high-pitched noise when breathing in or out (wheezes).    Your infant pulls or tugs at his or her ears often.    Your infant's lips or nails turn blue.    Your infant is sleeping more than normal.    MAKE SURE YOU:  Understand these instructions.  Will watch your baby's condition.  Will get help right away if your baby is not doing well or gets worse.    ADDITIONAL NOTES AND INSTRUCTIONS    Please follow up with your Primary MD in 24-48 hr.  Seek immediate medical care for any new/worsening signs or symptoms.

## 2025-04-06 NOTE — ED NOTES
Contacted pt's PCP Dr Reid Lei [phone]  Spoke with answering service requested a call back to notify of death

## 2025-04-06 NOTE — ED NOTES
Dr. Candelario to check for cardiac activity using ultrasound, no sign of cardiac activity. pulse check, no pulse.  Asystole noted on monitor. Dr. Candelario called [facility] at 1750.

## 2025-04-06 NOTE — ED NOTES
[facility] updated    released pt  Wife left  Other family still present.

## 2025-04-09 LAB
EKG ATRIAL RATE: 108 BPM
EKG ATRIAL RATE: 86 BPM
EKG DIAGNOSIS: NORMAL
EKG DIAGNOSIS: NORMAL
EKG P AXIS: 46 DEGREES
EKG P AXIS: 76 DEGREES
EKG P-R INTERVAL: 142 MS
EKG P-R INTERVAL: 162 MS
EKG Q-T INTERVAL: 360 MS
EKG Q-T INTERVAL: 426 MS
EKG QRS DURATION: 116 MS
EKG QRS DURATION: 118 MS
EKG QTC CALCULATION (BAZETT): 482 MS
EKG QTC CALCULATION (BAZETT): 509 MS
EKG R AXIS: 55 DEGREES
EKG R AXIS: 77 DEGREES
EKG T AXIS: -3 DEGREES
EKG T AXIS: 264 DEGREES
EKG VENTRICULAR RATE: 108 BPM
EKG VENTRICULAR RATE: 86 BPM

## 2025-04-09 PROCEDURE — 93010 ELECTROCARDIOGRAM REPORT: CPT | Performed by: INTERNAL MEDICINE

## 2025-04-20 DIAGNOSIS — E10.22 TYPE 1 DIABETES MELLITUS WITH STAGE 4 CHRONIC KIDNEY DISEASE (HCC): ICD-10-CM

## 2025-04-20 DIAGNOSIS — N18.4 TYPE 1 DIABETES MELLITUS WITH STAGE 4 CHRONIC KIDNEY DISEASE (HCC): ICD-10-CM

## 2025-04-20 RX ORDER — INSULIN ASPART INJECTION 100 [IU]/ML
INJECTION, SOLUTION SUBCUTANEOUS
Qty: 20 ML | Refills: 0 | OUTPATIENT
Start: 2025-04-20

## 2025-04-28 NOTE — CONSULTS
In patient Neurology consult        [facility] Neurology      MD Te Zepeda  1965    Date of Service: 7/16/2024    Referring Physician: Vega Blanchard DO      Reason for the consult and CC: Acute speech disturbance, left side weakness, possible new stroke.  Status post TNK    HPI:   The patient is a 59 y.o.  years old male with multiple medical problems came to the hospital with acute speech disturbance left-sided weakness along with chest pain.  Degree severe duration persistent.  Description dysarthria and left leg weakness and chest pain.  EMS was called patient was brought to the emergency room for further evaluation.  In emergency room CT head showed no acute intracranial abnormality.  CTA head and neck showed no flow and stenosis or LVO, and noted endovascular stent in the right ICA and left ICA 50% stenosis.  Code stroke was called patient was recommended TNK.  TNK was given earlier this morning at 0500.  Patient was mated to the ICU for further evaluation.  Today he denies headache, dizziness, dysarthria, dysphagia, worsening visual disturbance.  Other review of systems unremarkable       Constitutional:   Vitals:    07/16/24 1030 07/16/24 1100 07/16/24 1130 07/16/24 1200   BP: 133/65 132/78 127/66 139/64   Pulse: (!) 103 100 95 95   Resp: 10 14 16 15   Temp:    97.9 °F (36.6 °C)   TempSrc:    Temporal   SpO2: 99%  100% 100%   Weight:       Height:             I personally reviewed and updated social history, past medical history, medications, allergy, surgical history, and family history as documented in the patient's electronic health records.       ROS: 10-14 ROS reviewed with the patient/nurse/family which were unremarkable except mentioned in H&P.    General appearance:  Normal development and appear in no acute distress.   Mental Status:   Oriented to person, place, problem   Memory: Good immediate recall.  Intact remote memory  Normal attention span and  Attempted contacting Patient regarding recent ED Visit dated 4/27/25.   Patient was seen in [place] ED. Reached VM, left message provide office hours and phone number.    ED:[place]   CC: Fall    DX: Alcohol intoxication, Fall; Hypomagnesemia   Time: 12:17pm  Last OV: 9/19/24

## 2025-06-14 RX ORDER — CLOPIDOGREL BISULFATE 75 MG/1
75 TABLET ORAL DAILY
Qty: 90 TABLET | OUTPATIENT
Start: 2025-06-14

## (undated) DEVICE — BANDAGE COMPR W6INXL10YD ST M E WHITE/BEIGE

## (undated) DEVICE — DRILL BIT, AO, SCALED: Brand: VARIAX

## (undated) DEVICE — SOLUTION IRRIG 500ML 0.9% SOD CHLO USP POUR PLAS BTL

## (undated) DEVICE — PRECISION THIN (9.0 X 0.38 X 18.5MM)

## (undated) DEVICE — SYRINGE, LUER LOCK, 10ML: Brand: MEDLINE

## (undated) DEVICE — CATHETER GUID AD 6FR L100CM GRN NYL PTFE LCB W/O SIDE H

## (undated) DEVICE — CATH BLLN ANGIO 4.50X12MM NC EUPHORIA RX

## (undated) DEVICE — Z DUP USE 2257490 ADHESIVE SKIN CLSRE 036ML TPCL 2CTL CNCRLTE HIGH VSCSTY DRMB

## (undated) DEVICE — SUTURE VICRYL + SZ 4-0 L27IN ABSRB UD L26MM SH 1/2 CIR VCP415H

## (undated) DEVICE — PINNACLE INTRODUCER SHEATH: Brand: PINNACLE

## (undated) DEVICE — STARCLOSE SE VASCULAR CLOSURE SYSTEM: Brand: STARCLOSE SE

## (undated) DEVICE — RADIFOCUS GLIDEWIRE ADVANTAGE GUIDEWIRE: Brand: GLIDEWIRE ADVANTAGE

## (undated) DEVICE — CATHETER ANGIO 4FR L100CM S STL NYL JL5 3 SEG BRAID SFT

## (undated) DEVICE — SWAB CULT SGL AMIES W/O CHAR FOR THRT VAG SKIN HRT CULTSWAB

## (undated) DEVICE — 150CM STANDARD JWIRE

## (undated) DEVICE — ANGIO-SEAL VIP VASCULAR CLOSURE DEVICE: Brand: ANGIO-SEAL

## (undated) DEVICE — CATH BLLN ANGIO 4X12MM NC EUPHORIA RX

## (undated) DEVICE — GLOVE ORTHO 7 1/2   MSG9475

## (undated) DEVICE — LOWER EXTREMITY: Brand: MEDLINE INDUSTRIES, INC.

## (undated) DEVICE — SUTURE VICRYL + SZ 3-0 L27IN ABSRB UD CT-2 L26MM 1/2 CIR TAPR VCP232H

## (undated) DEVICE — Device

## (undated) DEVICE — INTRODUCER SHTH 0.018 IN 4 FRX70 CM 21 GAX7 CM KT MIC VSI

## (undated) DEVICE — WET SKIN PREP TRAY: Brand: MEDLINE INDUSTRIES, INC.

## (undated) DEVICE — BAG DRAINAGE NS

## (undated) DEVICE — STOCKINETTE,IMPERVIOUS,12X48,STERILE: Brand: MEDLINE

## (undated) DEVICE — SUTURE NONABSORBABLE MONOFILAMENT 4-0 PS-2 18 IN BLK ETHILON 1667G

## (undated) DEVICE — ZIMMER® STERILE DISPOSABLE TOURNIQUET CUFF WITH PLC, DUAL PORT, SINGLE BLADDER, 18 IN. (46 CM)

## (undated) DEVICE — [HIGH FLOW INSUFFLATOR,  DO NOT USE IF PACKAGE IS DAMAGED,  KEEP DRY,  KEEP AWAY FROM SUNLIGHT,  PROTECT FROM HEAT AND RADIOACTIVE SOURCES.]: Brand: PNEUMOSURE

## (undated) DEVICE — KIT AT-X65 ANGIOTOUCH HAND CONTROLLER

## (undated) DEVICE — PAD, DEFIB, ADULT, RADIOTRANS, PHYSIO: Brand: MEDLINE

## (undated) DEVICE — 3M™ COBAN™ NL STERILE NON-LATEX SELF-ADHERENT WRAP, 2084S, 4 IN X 5 YD (10 CM X 4,5 M), 18 ROLLS/CASE: Brand: 3M™ COBAN™

## (undated) DEVICE — SUTURE ETHILON SZ 3-0 L18IN NONABSORBABLE BLK PS-2 L19MM 3/8 1669H

## (undated) DEVICE — GUIDEWIRE VASC L260CM DIA0.035IN TAPR 3CM LONG REGULAR GLIDE

## (undated) DEVICE — SET ADMIN NEEDLESS Y SITE RLER CLMP M SPIN LOK 10 GTT LEN

## (undated) DEVICE — MERCY HEALTH WEST TURNOVER: Brand: MEDLINE INDUSTRIES, INC.

## (undated) DEVICE — KIT CATH 5.2FR 100CM 145DEG ANGIO VASC DIAG JUDKINS R 4

## (undated) DEVICE — SOLUTION IV 1000ML 0.9% SOD CHL PH 5 INJ USP VIAFLX PLAS

## (undated) DEVICE — CANISTER, RIGID, 1200CC: Brand: MEDLINE INDUSTRIES, INC.

## (undated) DEVICE — SPONGE GZ W4XL4IN COT 12 PLY TYP VII WVN C FLD DSGN

## (undated) DEVICE — Device: Brand: NOMOLINE™ LH ADULT NASAL CO2 CANNULA WITH O2 4M

## (undated) DEVICE — PERCLOSE™ PROSTYLE™ SUTURE-MEDIATED CLOSURE AND REPAIR SYSTEM: Brand: PERCLOSE™ PROSTYLE™

## (undated) DEVICE — LAPAROSCOPY PK

## (undated) DEVICE — ARMADA 14 PTA CATHETER 2.5 MM X 120 MM X 150 CM / OVER-THE-WIRE: Brand: ARMADA

## (undated) DEVICE — CHLORAPREP 26ML ORANGE

## (undated) DEVICE — PACK PROCEDURE SURG EXTREMITY MFFOP CUST

## (undated) DEVICE — MASC TURNOVER KIT: Brand: MEDLINE INDUSTRIES, INC.

## (undated) DEVICE — BASIC SINGLE BASIN 1-LF: Brand: MEDLINE INDUSTRIES, INC.

## (undated) DEVICE — COLLECTOR SPEC RAYON LIQ STUART DBL FOR THRT VAG SKIN WND

## (undated) DEVICE — CORONARY IMAGING CATHETER: Brand: OPTICROSS™ 6 HD

## (undated) DEVICE — TOWEL,OR,DSP,ST,BLUE,STD,4/PK,20PK/CS: Brand: MEDLINE

## (undated) DEVICE — COVER LT HNDL BLU PLAS

## (undated) DEVICE — DRAPE,ANGIO,BRACH,STERILE,38X44: Brand: MEDLINE

## (undated) DEVICE — CATH LAB PACK: Brand: MEDLINE INDUSTRIES, INC.

## (undated) DEVICE — GLOVE SURG SZ 8 CRM LTX FREE POLYISOPRENE POLYMER BEAD ANTI

## (undated) DEVICE — INTENDED FOR TISSUE SEPARATION, AND OTHER PROCEDURES THAT REQUIRE A SHARP SURGICAL BLADE TO PUNCTURE OR CUT.: Brand: BARD-PARKER ® CARBON RIB-BACK BLADES

## (undated) DEVICE — TROCAR ENDOSCP L100MM DIA12MM DIL TIP STBL SL ENDOPATH XCEL

## (undated) DEVICE — HANDPIECE SET WITH HIGH FLOW TIP AND SUCTION TUBE: Brand: INTERPULSE

## (undated) DEVICE — CYSTO PACK: Brand: MEDLINE INDUSTRIES, INC.

## (undated) DEVICE — ELECTRODE PT RET AD L9FT HI MOIST COND ADH HYDRGEL CORDED

## (undated) DEVICE — Device: Brand: ASAHI SION BLUE

## (undated) DEVICE — 4FR MICROPUNCTURE KIT STIFFEN

## (undated) DEVICE — BANDAGE GZ W2XL75IN ST RAYON POLY CNFRM STRTCH LTWT

## (undated) DEVICE — HI-TORQUE COMMAND ES GUIDE WIRE .014" 300 CM: Brand: HI-TORQUE COMMAND

## (undated) DEVICE — PADDING CAST W4INXL4YD HIGHLY ABSRB THAN COT EZ APPL

## (undated) DEVICE — STRIP,CLOSURE,WOUND,MEDI-STRIP,1/2X4: Brand: MEDLINE

## (undated) DEVICE — NITINOL STONE RETRIEVAL BASKET: Brand: ZERO TIP

## (undated) DEVICE — Z DISCONTINUED NO SUB IDED PLATE ES AD W 9FT CRD 2

## (undated) DEVICE — GLOVE ORANGE PI 8   MSG9080

## (undated) DEVICE — SOLUTION IRRIGATION STRL H2O 1000 ML UROMATIC CONTAINER

## (undated) DEVICE — GOWN SIRUS NONREIN XL W/TWL: Brand: MEDLINE INDUSTRIES, INC.

## (undated) DEVICE — DESTINATION PERIPHERAL GUIDING SHEATH: Brand: DESTINATION

## (undated) DEVICE — GAUZE,SPONGE,4"X4",8PLY,STRL,LF,10/TRAY: Brand: MEDLINE

## (undated) DEVICE — SYRINGE MED 10ML SLIP TIP BLNT FILL AND LUERLOCK DISP

## (undated) DEVICE — GUIDEWIRE VASC L150CM DIA0.035IN FLX END L7CM J 3MM PTFE

## (undated) DEVICE — APPLICATOR MEDICATED 26 CC SOLUTION HI LT ORNG CHLORAPREP

## (undated) DEVICE — PADDING CAST W4INXL4YD ST COT RAYON MICROPLEATED HIGHLY

## (undated) DEVICE — SUTURE MONOCRYL + SZ 4-0 L27IN ABSRB UD L19MM PS-2 3/8 CIR MCP426H

## (undated) DEVICE — POSITIONER HD W8XH4XL8.5IN RASPBERRY FOAM SLT

## (undated) DEVICE — GLOVE ORTHO 8   MSG9480

## (undated) DEVICE — GLOVE ORANGE PI 7   MSG9070

## (undated) DEVICE — HYPODERMIC SAFETY NEEDLE: Brand: MAGELLAN

## (undated) DEVICE — ARMADA 14 PTA CATHETER 2.5 MM X 60 MM X 150 CM / OVER-THE-WIRE: Brand: ARMADA

## (undated) DEVICE — KIT MFLD ISOLATN NACL CNTRST PRT TBNG SPIK W/ PRSS TRNSDUC

## (undated) DEVICE — AMPLATZ ULTRA STIFF WIRE GUIDE: Brand: AMPLATZ

## (undated) DEVICE — BANDAGE COMPR W4INXL10YD WHITE/BEIGE E MTRX HK LOOP CLSR

## (undated) DEVICE — SUTURE ETHLN SZ 3-0 L18IN NONABSORBABLE BLK PS-2 L19MM 3/8 1669H

## (undated) DEVICE — PACLITAXEL-COATED BALLOON CATHETER: Brand: AGENT™

## (undated) DEVICE — TROCAR: Brand: KII® SHEILDED BLADED ACCESS SYSEM

## (undated) DEVICE — SOLUTION IV IRRIG POUR BRL 0.9% SODIUM CHL 2F7124

## (undated) DEVICE — Device: Brand: QUICK-CROSS SUPPORT CATHETER

## (undated) DEVICE — INTENDED FOR TISSUE SEPARATION, AND OTHER PROCEDURES THAT REQUIRE A SHARP SURGICAL BLADE TO PUNCTURE OR CUT.: Brand: BARD-PARKER ® STAINLESS STEEL BLADES

## (undated) DEVICE — Device: Brand: MEDEX

## (undated) DEVICE — BANDAGE COMPR W4INXL5YD BGE HI E W/ REM CLP SURE-WRAP

## (undated) DEVICE — CATHETER ANGIO 5FR L65CM 0.035IN VIS OMNI FLUSH-0 SFT TIP

## (undated) DEVICE — T-DRAPE,EXTREMITY,STERILE: Brand: MEDLINE

## (undated) DEVICE — CATHETER 5FR IM CORDIS 100CM

## (undated) DEVICE — INSUFFLATION NEEDLE TO ESTABLISH PNEUMOPERITONEUM.: Brand: INSUFFLATION NEEDLE

## (undated) DEVICE — TROCAR: Brand: KII FIOS FIRST ENTRY

## (undated) DEVICE — GUIDEWIRE VASC ANGLED 3 CM 0.035 INX180 CM SHORT STIFF GLIDE

## (undated) DEVICE — CATHETER DIAG AD 4FR L100CM STD NYL JUDKINS R 4 TRULUMEN

## (undated) DEVICE — C-ARM: Brand: UNBRANDED

## (undated) DEVICE — SYRINGE 20ML LL S/C 50

## (undated) DEVICE — SYRINGE MED 10ML LUERLOCK TIP W/O SFTY DISP

## (undated) DEVICE — BANDAGE,GAUZE,BULKEE II,4.5"X4.1YD,STRL: Brand: MEDLINE

## (undated) DEVICE — SUTURE VCRL SZ 4-0 L18IN ABSRB UD L19MM PS-2 3/8 CIR PRIM J496H

## (undated) DEVICE — STRIP WND PK W0.25INXL5YD IODO GZ TIGHTLY WVN CURAD

## (undated) DEVICE — CATH BLLN ANGIO 4X15MM NC EUPHORIA RX

## (undated) DEVICE — MICROSURGICAL DILATATION DEVICE: Brand: WOLVERINE CORONARY CUTTING BALLOON

## (undated) DEVICE — 260 CM J TIP WIRE .035

## (undated) DEVICE — SOLUTION IRRIG 1000ML STRL H2O USP PLAS POUR BTL

## (undated) DEVICE — DRESSING PETRO W3XL3IN OIL EMUL N ADH GZ KNIT IMPREG CELOS

## (undated) DEVICE — GARMENT COMPR STD FOR 17IN CALF UNIF THER FLOTRN

## (undated) DEVICE — CATHETER URET 5FR L70CM OPN END SGL LUMN INJ HUB FLEXIMA

## (undated) DEVICE — DRESSING GERM DIA1IN CNTR H DIA7MM BLU CHG ANTIMIC PROTCT

## (undated) DEVICE — GOWN,SIRUS,POLYRNF,BRTHSLV,XL,30/CS: Brand: MEDLINE

## (undated) DEVICE — GUIDEWIRE ENDOSCP L150CM DIA0.035IN TIP 3CM PTFE NIT

## (undated) DEVICE — CATHETER ANGIO 4FR L100CM S STL NYL IM 3 SEG BRAID SFT

## (undated) DEVICE — BANDAGE COMPR W4INXL12FT E DISP ESMARCH EVEN

## (undated) DEVICE — BENTSON GUIDEWIRE ANGIO L150CM OD0.035IN STR FIX PTFE SLD SUPP

## (undated) DEVICE — 3M™ TEGADERM™ TRANSPARENT FILM DRESSING FRAME STYLE, 1626W, 4 IN X 4-3/4 IN (10 CM X 12 CM), 50/CT 4CT/CASE: Brand: 3M™ TEGADERM™

## (undated) DEVICE — CATH BLLN ANGIO 3.25X15MM NC EUPHORIA RX

## (undated) DEVICE — 5FR MYNXGRIP VASCULAR CLOSURE DEVICE

## (undated) DEVICE — TROCAR: Brand: KII SHIELDED BLADED ACCESS SYSTEM

## (undated) DEVICE — SUTURE VCRL SZ 3-0 L27IN ABSRB UD L26MM SH 1/2 CIR J416H

## (undated) DEVICE — TROCARS: Brand: KII® BALLOON BLUNT TIP SYSTEM

## (undated) DEVICE — DRAPE,LAP,CHOLE,W/TROUGHS,STERILE: Brand: MEDLINE

## (undated) DEVICE — ELECTRODE ES AD PED L2.5IN TEF INSUL MOD NONCORDED BLDE TIP

## (undated) DEVICE — MEDICINE CUP, GRADUATED, STER: Brand: MEDLINE

## (undated) DEVICE — CXI SUPPORT CATHETER: Brand: CXI